# Patient Record
Sex: FEMALE | Race: WHITE | NOT HISPANIC OR LATINO | Employment: OTHER | URBAN - METROPOLITAN AREA
[De-identification: names, ages, dates, MRNs, and addresses within clinical notes are randomized per-mention and may not be internally consistent; named-entity substitution may affect disease eponyms.]

---

## 2019-01-14 ENCOUNTER — TELEPHONE (OUTPATIENT)
Dept: OBSTETRICS AND GYNECOLOGY | Facility: CLINIC | Age: 59
End: 2019-01-14

## 2019-01-14 NOTE — TELEPHONE ENCOUNTER
Adela    She can be added to my schedule on Thursday at the end of the day.  She should be made aware that she might have a long wait to be seen.    Tereza Starks,    Patient is temporarily living in [place] for her job, she called the office for an appointment due to a lesion on her left breast and a painful lump on right underarm, both of which she noticed about 6 months ago. She reports that she has not seen a physician in almost 20 years, has not had screening mammo since she was in her 40's and she is 60 y/o now. She said she cannot get a mammogram because her breast will start bleeding.  She reports that she has taken a few weeks off of work to address these issues. Can you work her in?       Thanks   Adela

## 2019-01-17 ENCOUNTER — OFFICE VISIT (OUTPATIENT)
Dept: OBSTETRICS AND GYNECOLOGY | Facility: CLINIC | Age: 59
End: 2019-01-17

## 2019-01-17 ENCOUNTER — OFFICE VISIT (OUTPATIENT)
Dept: SURGERY | Facility: CLINIC | Age: 59
End: 2019-01-17

## 2019-01-17 VITALS — OXYGEN SATURATION: 98 % | BODY MASS INDEX: 26.2 KG/M2 | HEIGHT: 66 IN | WEIGHT: 163 LBS | HEART RATE: 95 BPM

## 2019-01-17 VITALS
SYSTOLIC BLOOD PRESSURE: 132 MMHG | HEIGHT: 66 IN | DIASTOLIC BLOOD PRESSURE: 80 MMHG | WEIGHT: 163 LBS | BODY MASS INDEX: 26.2 KG/M2

## 2019-01-17 DIAGNOSIS — C50.812 MALIGNANT NEOPLASM OF OVERLAPPING SITES OF LEFT FEMALE BREAST, UNSPECIFIED ESTROGEN RECEPTOR STATUS (HCC): Primary | ICD-10-CM

## 2019-01-17 DIAGNOSIS — Z12.11 COLON CANCER SCREENING: Primary | ICD-10-CM

## 2019-01-17 DIAGNOSIS — C50.412 MALIGNANT NEOPLASM OF UPPER-OUTER QUADRANT OF LEFT FEMALE BREAST, UNSPECIFIED ESTROGEN RECEPTOR STATUS (HCC): ICD-10-CM

## 2019-01-17 DIAGNOSIS — Z01.419 ENCOUNTER FOR GYNECOLOGICAL EXAMINATION WITH PAPANICOLAOU SMEAR OF CERVIX: ICD-10-CM

## 2019-01-17 LAB
DEVELOPER EXPIRATION DATE: NORMAL
DEVELOPER LOT NUMBER: NORMAL
EXPIRATION DATE: NORMAL
FECAL OCCULT BLOOD SCREEN, POC: NEGATIVE
Lab: NORMAL
NEGATIVE CONTROL: NEGATIVE
POSITIVE CONTROL: POSITIVE

## 2019-01-17 PROCEDURE — 99386 PREV VISIT NEW AGE 40-64: CPT | Performed by: OBSTETRICS & GYNECOLOGY

## 2019-01-17 PROCEDURE — G0328 FECAL BLOOD SCRN IMMUNOASSAY: HCPCS | Performed by: OBSTETRICS & GYNECOLOGY

## 2019-01-17 PROCEDURE — 99244 OFF/OP CNSLTJ NEW/EST MOD 40: CPT | Performed by: SURGERY

## 2019-01-17 NOTE — PROGRESS NOTES
Subjective   Marylu Georges is a 59 y.o. female who presents to the office in surgical consultation from Luis M Starks,Rick for a left breast mass.    History of Present Illness     The patient has noticed a breast mass for about 2 years that has slowly gotten larger and is now ulcerated and draining.  She states that the area has been bleeding since June 2018.  At times it bleeds more than other times.  She has never had a mammogram or ultrasound.  She has had no treatment for the breast mass.  She has a significant family history of breast cancer in her mother and her sister.    Review of Systems   Constitutional: Negative for activity change, appetite change, fatigue and fever.   HENT: Negative for trouble swallowing and voice change.    Respiratory: Negative for chest tightness and shortness of breath.    Cardiovascular: Negative for chest pain and palpitations.   Gastrointestinal: Negative for abdominal pain, blood in stool, constipation, diarrhea, nausea and vomiting.   Endocrine: Negative for cold intolerance and heat intolerance.   Genitourinary: Negative for dysuria and flank pain.   Neurological: Negative for dizziness and light-headedness.   Hematological: Negative for adenopathy. Does not bruise/bleed easily.   Psychiatric/Behavioral: Negative for agitation and confusion.     Past Medical History:   Diagnosis Date   • Arthritis      Past Surgical History:   Procedure Laterality Date   • LEG SURGERY Left      Family History   Problem Relation Age of Onset   • Cancer Father    • Cancer Mother    • Breast cancer Mother    • Breast cancer Sister      Social History     Socioeconomic History   • Marital status: Single     Spouse name: Not on file   • Number of children: Not on file   • Years of education: Not on file   • Highest education level: Not on file   Social Needs   • Financial resource strain: Not on file   • Food insecurity - worry: Not on file   • Food insecurity - inability: Not on file   •  Transportation needs - medical: Not on file   • Transportation needs - non-medical: Not on file   Occupational History   • Not on file   Tobacco Use   • Smoking status: Never Smoker   Substance and Sexual Activity   • Alcohol use: Yes   • Drug use: No   • Sexual activity: Not Currently     Birth control/protection: Post-menopausal   Other Topics Concern   • Not on file   Social History Narrative   • Not on file       Objective   Physical Exam   Constitutional: She is oriented to person, place, and time. She appears well-developed and well-nourished.  Non-toxic appearance.   Eyes: EOM are normal. No scleral icterus.   Pulmonary/Chest: Effort normal. No respiratory distress.   Right breast: Normal appearance with no palpable masses.  There is a large axillary lymph node.  Left breast: Large ulcerated mass occupying the majority of the breast but mobile and not invading the chest wall.  The ulceration is along the upper outer quadrant and there are areas of oozing.  There is no obvious axillary lymphadenopathy.   Abdominal: Normal appearance.   Neurological: She is alert and oriented to person, place, and time.   Skin: Skin is warm and dry.   Psychiatric: She has a normal mood and affect. Her behavior is normal. Judgment and thought content normal.       Assessment/Plan       The encounter diagnosis was Malignant neoplasm of overlapping sites of left female breast, unspecified estrogen receptor status (CMS/HCC).    The patient has a large left breast mass that is an obvious breast cancer.  It is complicated with an ulceration and bleeding.  It occupies such a large portion of the breast and the ulcer is large enough that a mastectomy a not be possible due to lack of skin coverage over the chest wall.  She also has a large lymph node in the opposite axilla.  She has been sent to oncology for evaluation because initiation of chemotherapy and radiation therapy is necessary.

## 2019-01-18 NOTE — PROGRESS NOTES
[facility]  [address], [address]  [place] [address]  Phone: [phone] / Fax:  [phone]      01/17/2019    [address] [place] [address] [address] [place] [address]    Provider, No Known    Chief Complaint   Patient presents with   • Gynecologic Exam     Np Annual, Patient with Left breast mass that painful and bleeding.       Marylu Georges is here for annual gynecologic exam.  HPI - Patient has not had a gynecologic or breast evaluation in over 10 years.  Patient states that her work and lifestyle does not lend itself to seeing a physician regularly.  She noticed over 9 months ago, pain and bleeding in her left breast.  The lesion has gradually worsened such that the lesion is enlarged and continually bleeds.  Last mammogram was also over 10 years ago.  She believes her last period was over 5 years ago.    Past Medical History:   Diagnosis Date   • Arthritis        Past Surgical History:   Procedure Laterality Date   • LEG SURGERY Left        Allergies   Allergen Reactions   • Erythromycin GI Intolerance   • Penicillins GI Intolerance       Social History     Socioeconomic History   • Marital status: Single     Spouse name: Not on file   • Number of children: Not on file   • Years of education: Not on file   • Highest education level: Not on file   Social Needs   • Financial resource strain: Not on file   • Food insecurity - worry: Not on file   • Food insecurity - inability: Not on file   • Transportation needs - medical: Not on file   • Transportation needs - non-medical: Not on file   Occupational History   • Not on file   Tobacco Use   • Smoking status: Never Smoker   Substance and Sexual Activity   • Alcohol use: Yes   • Drug use: No   • Sexual activity: Not Currently     Birth control/protection: Post-menopausal   Other Topics Concern   • Not on file   Social History Narrative   • Not on file       Family History   Problem Relation Age of Onset   • Cancer Father    • Cancer Mother    •  "Breast cancer Mother    • Breast cancer Sister        No LMP recorded. Patient is postmenopausal.    OB History      Para Term  AB Living    0 0 0 0 0 0    SAB TAB Ectopic Molar Multiple Live Births    0 0 0 0 0 0          Vitals:    19 1207   BP: 132/80   Weight: 73.9 kg (163 lb)   Height: 167.6 cm (66\")       Physical Exam   Constitutional: She appears well-developed and well-nourished.   Genitourinary: Rectum normal, vagina normal and uterus normal. Pelvic exam was performed with patient supine. There is no tenderness or lesion on the right labia. There is no tenderness or lesion on the left labia. Right adnexum does not display mass, does not display tenderness and does not display fullness. Left adnexum does not display mass, does not display tenderness and does not display fullness. Cervix does not exhibit motion tenderness or lesion. Rectal exam shows no mass and guaiac negative stool.   HENT:   Right Ear: External ear normal.   Left Ear: External ear normal.   Nose: Nose normal.   Eyes: Conjunctivae are normal.   Neck: Normal range of motion. Neck supple. No thyromegaly present.   Cardiovascular: Normal rate, regular rhythm and normal heart sounds.   Pulmonary/Chest: Effort normal. No stridor. She has no wheezes. Right breast exhibits no mass, no skin change and no tenderness. Left breast exhibits mass, skin change and tenderness.   Abdominal: Soft. There is no tenderness. There is no guarding.   Musculoskeletal: Normal range of motion. She exhibits no edema.   Lymphadenopathy:     She has axillary adenopathy.        Right axillary: Pectoral and lateral adenopathy present.        Left axillary: Pectoral and lateral adenopathy present.   Neurological: She is alert.   Skin: Skin is warm and dry. No erythema.   Psychiatric: She has a normal mood and affect. Her behavior is normal. Judgment and thought content normal.   Vitals reviewed.      Marylu was seen today for gynecologic " exam.    Diagnoses and all orders for this visit:    Colon cancer screening  -     POC Occult Blood Stool    Encounter for gynecological examination with Papanicolaou smear of cervix  -     IgP, Aptima HPV        Luis M Starks MD

## 2019-01-19 LAB
CYTOLOGIST CVX/VAG CYTO: NORMAL
CYTOLOGY CVX/VAG DOC THIN PREP: NORMAL
DX ICD CODE: NORMAL
HIV 1 & 2 AB SER-IMP: NORMAL
HPV I/H RISK 4 DNA CVX QL PROBE+SIG AMP: NEGATIVE
OTHER STN SPEC: NORMAL
PATH REPORT.FINAL DX SPEC: NORMAL
STAT OF ADQ CVX/VAG CYTO-IMP: NORMAL

## 2019-01-21 NOTE — PROGRESS NOTES
Subjective     REASON FOR CONSULTATION:  1. 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 R BREAST MASS WITH GIANT R AXILLARY ADENOPATHY.  3. FAMILY HISTORY OF BREAST CANCER IN MOTHER AND SISTER, SISTER WAS TESTED FOR BRCA AND WAS NEGATIVE.  Provide an opinion on any further workup or treatment                             REQUESTING PHYSICIAN:  DR DAVID COLBY MD    RECORDS OBTAINED:  Records of the patients history including those obtained from the referring provider were reviewed and summarized in detail.        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 She typically goes to multiple places throughout the [place]. In any event, she has been referred to me because the patient has had a mass in the left breast at least for 3 or 4 years that has been slowly growing until became giant and ulcerated and has been having bleeding at least for the last 2 or 3 months. The patient is having pain, especially in the periphery of the tumor. The breast has become so big that she has difficulty dealing with this anymore. She also has noticed that in the right axilla she has developed a huge mass that is not painful that has been present at least and growing steadily for the last 3 or 4 months. She has not had any palpable abnormalities in the right breast but she is suspicious in an area external to the nipple. The patient denies any skeletal pain. Her appetite has been excellent and her weight is stable. She has not had any cough, sputum production or shortness of breath. Her bowel function and urination are normal. She has not had any vaginal bleeding or discharge. She has not had any other skin lesions. She denies any neurological  symptomatology.     The patient is extremely committed to her animals, living with the horses, and she has forgotten that she has to take care of herself. She has not been seen by a doctor in more than 20 years. She occasionally is seen by doctors available in [place] for a Pap smear here or a Zithromax pack there but other than that she has no regular followup of any kind.        Past Surgical History:   Procedure Laterality Date   • LEG SURGERY Left         Current Outpatient Medications on File Prior to Visit   Medication Sig Dispense Refill   • diclofenac sodium (VOTAREN XR) 100 MG 24 hr tablet Take 100 mg by mouth Daily.       No current facility-administered medications on file prior to visit.         ALLERGIES:    Allergies   Allergen Reactions   • Erythromycin GI Intolerance   • Penicillins GI Intolerance        Social History     Socioeconomic History   • Marital status: Significant Other     Spouse name: Not on file   • Number of children: 0   • Years of education: Not on file   • Highest education level: Not on file   Social Needs   • Financial resource strain: Not hard at all   • Food insecurity - worry: Never true   • Food insecurity - inability: Never true   • Transportation needs - medical: No   • Transportation needs - non-medical: No   Occupational History   • Occupation:      Employer: SELF-EMPLOYED   Tobacco Use   • Smoking status: Never Smoker   • Smokeless tobacco: Never Used   Substance and Sexual Activity   • Alcohol use: Yes     Alcohol/week: 4.2 oz     Types: 4 Glasses of wine, 3 Cans of beer per week     Frequency: 2-3 times a week     Drinks per session: 1 or 2     Binge frequency: Never   • Drug use: No   • Sexual activity: Not Currently     Partners: Male     Birth control/protection: Post-menopausal        Family History   Problem Relation Age of Onset   • Lung cancer Father    • Cancer Mother    • Breast cancer Mother    • Breast cancer Sister    • Breast cancer  Maternal Grandmother    • Breast cancer Paternal Grandmother    • Breast cancer Maternal Uncle         Review of Systems   General: no fever, no chills, no fatigue,no weight changes, no lack of appetite.  Eyes: no epiphora, xerophthalmia,conjunctivitis, pain, glaucoma, blurred vision, blindness, secretion, photophobia, proptosis, diplopia.  Ears: no otorrhea, tinnitus, otorrhagia, deafness, pain, vertigo.  Nose: no rhinorrhea, no epistaxis, no alteration in perception of odors, no sinuses pressure.  Mouth: no alteration in gums or teeth,  No ulcers, no difficulty with mastication or deglut ion, no odynophagia.  Neck: no masses or pain, no thyroid alterations, no pain in muscles or arteries, no carotid odynia, no crepitation.  Respiratory: no cough, no sputum production,no dyspnea,no trepopnea, no pleuritic pain,no hemoptysis.  Heart: no syncope, no irregularity, no palpitations, no angina,no orthopnea,no paroxysmal nocturnal dyspnea.  Vascular Venous: no tenderness,no edema,no palpable cords,no postphlebitic syndrome, no skin changes no ulcerations.  Vascular Arterial: no distal ischemia, noclaudication, no gangrene, no neuropathic ischemic pain, no skin ulcers, no paleness no cyanosis.  GI: no dysphagia, no odynophagia, no regurgitation, no heartburn,no indigestion,no nausea,no vomiting,no hematemesis ,no melena,no jaundice,no distention, no obstipation,no enterorrhagia,no proctalgia,no anal  lesions, no changes in bowel habits.  : no frequency, no hesitancy, no hematuria, no discharge,no  pain.  Musculoskeletal: no muscle or tendon pain or inflammation,no  joint pain, no edema, no functional limitation,no fasciculations, no mass.  Neurologic: no headache, no seizures, noalterations on Craneal nerves, no motor deficit, no sensory deficit, normal coordination, no alteration in memory,normal orientation, calculation,normal writting, verbal and written language.  Skin: no rashes,no pruritus no localized  "lesions.  Psychiatric: no anxiety, no depression,no agitation, no delusions, proper insight.      Objective     Vitals:    01/22/19 1614   BP: 143/88   Pulse: 83   Resp: 12   Temp: 98.8 °F (37.1 °C)   TempSrc: Oral   SpO2: 98%   Weight: 72.7 kg (160 lb 4.8 oz)   Height: 163 cm (64.17\")   PainSc: 0-No pain     Current Status 1/22/2019   ECOG score 0       Physical Exam    GENERAL:  Well-developed, well-nourished  Patient  in no acute distress.   SKIN:  Warm, dry ,NO rashes,NO purpura ,NO petechiae.  HEENT:  Pupils were equal and reactive to light and accomodation, conjunctivas non injected, no pterigion, normal extraocular movements, normal visual acuity.   Mouth mucosa was moist, no exudates in oropharynx, normal gum line, normal roof of the mouth and pillars, normal papillations of the tongue.  NECK:  Supple with good range of motion; no thyromegaly or masses, no JVD or bruits, no cervical adenopathies.No carotid arteries pain, no carotid abnormal pulsation , NO arterial dance.  LYMPHATICS:  No cervical, NO supraclavicular, NO axillary,NO epitrochlear , NO inguinal adenopathy.  CHEST:  Normal excursion of both luz thoraces, normal voice fremitus, no subcutaneous emphysema, normal axillas, no rashes or acanthosis nigricans. Lungs clear to percussion and auscultation, normal breath sounds bilaterally, no wheezing,NO crackles NO ronchi, NO stridor, NO rubs.  CARDIAC AND VASCULAR:  normal rate and regular rhythm, without murmurs,NO rubs NO S3 NO S4 right or left . Normal femoral, popliteal, pedis, brachial and carotid pulses.  INSPECTION of R breast documented symmetry of the tissue per se and location and size of the nipple,no retractions or inversion of the nipple, normal skin without lesions, no erythema or nodules,no paud'orange, no prominence of superficial veins or chest wall collateral circulation.PALPATION of the breast documented normal skin turgor, no induration, alteration in local temperature, or pain, 9 " O'CLOCK 4 CM DISCRETE MASS palpable, normal mobility of the tissues,no fixation of the tissue or parenchyma to the chest wall, MAJOR alteration at the tail of the breasts RIGHT 9 CM UNIFORM FIXED NONE TENDER NOT FLUCTUANT adenopathY. On the left breast, the whole breast is replaced by a huge mass that is close to 25 cm in diameter with a large area of ulceration that measures close to 15 cm in diameter with central area of necrosis and oozing of material that is gelatinous and nonodorous. There is minimal erythema at the edges of the tumor and the mass is fixed to the chest wall, nonmobile. There is abundant amount of collateral circulation in the anterior chest wall superiorly on the left hemithorax. Significant enough there is no obvious visible or palpable adenopathies in the left axilla. There is no lymphedema in either upper extremity.  ABDOMEN:  Soft, nontender with no organomegaly or masses, no ascites, no collateral circulation,no distention,no Vernon sign, no abdominal pain, no inguinal hernias,no umbilical hernia, no abdominal bruits. Normal bowel sounds.  GENITAL: Not  Performed.  EXTREMITIES  AND SPINE:  No clubbing, cyanosis or edema, OA FINGERS deformities or pain .No kyphosis, scoliosis, deformities or pain in spine, ribs or pelvic bone.  NEUROLOGICAL:  Patient was awake, alert, oriented to time, person and place.        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 At least for the last 4 years, she has had an in crescendo mass in the left breast that has produced a gigantic tumor that is now close to 25 cm in size and is replacing most of the anatomy of the left breast. There are areas of ulceration necrosis and bleeding and the mass is fixed to the chest wall. She has abundant amount of collateral circulation in the left anterior chest wall and it caught my attention the lack of any left axillary adenopathy. She has no lymphedema in the left upper extremity. In the right breast while in sitting position, no palpable abnormalities are documented. The right breast skin and nipple are normal. When the patient lies flat, there is a palpable mass at 9 o'clock from the nipple that measures probably 4 cm in size, very indistinct in regard to edges and margins. There was no mobility and no areas of tenderness. She has a giant adenopathy in the right axilla that measures close to 9 cm in size, uniform, no fluctuation, nontender, completely fixed to the axillary wall. There is no compromise of the skin.     There is no supraclavicular or infraclavicular adenopathies. The rest of her physical examination is normal.     The degree of comorbidities in this patient are minimal with the exception of minimal osteoarthritis in the fingers of her hands. She typically does not take any medicines and obviously, she has neglected herself at least for the last 20 years in regard to medical care.     The patient also has a component of anemia that is very likely anemia of neoplastic disease.     Obviously, this patient has probably bilateral breast cancers. She has a right axillary adenopathy. She has dramatic compromise of the left breast and she has ulceration necrosis and oozing from this site. She has pain but she is tough enough that she does not want to take any pain medicine.       The patient also has a strong family history of  breast cancer in her mother who developed breast cancer when she was 40; her sister developed breast cancer when she was pregnant. The patient had a BRCA analysis that was negative. Nevertheless, the family history extends furthermore into more family members with breast cancer.     Therefore, under the present premises and discussing this with the patient and her brother who came from [place], we advised the patient the followin. She will return to the lab to proceed with laboratory testing that will include a complete metabolic panel, an anemia workup that will include a ferritin, iron, TIBC, B12, folic acid level and she will have tumor marker, CA 15-3. An LDH will be done. A sedimentation rate will be done.   2. The patient will proceed with an MRI scan of the brain.   3. The patient will proceed with a CT scan of the chest, abdomen and pelvis.   4. The patient will proceed with a bone scan.   5. The patient will proceed with a right-sided mammogram and if a mass is found, biopsy of that tumor and also she will require right axillary biopsy. Also the patient will require a left breast biopsy. All these biopsies can be done with needles.     After all this information is collected, the patient will be brought back to the office in a week, go through the pathology and make further recommendations. On clinical grounds, the patient has probably bilateral breast cancers. It caught my attention very dramatically the lack of left axillary adenopathy for a tumor that has been growing in the left breast at least for 4 years. It caught my attention the abnormality in the palpable right breast with such a giant adenopathy in the right axilla.      I have the belief that maybe we are going to face 2 different kinds of malignancy, one of each in each breast.     Therefore, we will proceed with pathological analysis of this tissue, do ER/CO, HER2 maria eugenia by FISH, Ki-67 and obviously go from there.     Obviously, the  metastatic workup is going to tell us if we are facing some other problems that I will not be surprised to find even though on clinical grounds seems to be not that obvious. The patient is strong. 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 She is very strong in regard to her physical activity and she is extremely hardheaded and ready to go to work on daily basis. Therefore, I would not be surprised if we have to end up obviously placing a port for initiation of neoadjuvant chemotherapy.     Obviously, one of the questions that I have will be if we go through neoadjuvant treatment is how to proceed with an echocardiogram or measurement of ejection fraction. Maybe she will need to have a MUGA ejection fraction or a transesophageal echocardiogram because I doubt that a technician will be able to navigate through her left anterior chest wall.     I will discuss the rest of the findings and all the laboratory testing when the patient returns back in a week or so and then we will make a plan of treatment according to needs.     I discussed all these facts with the patient and her brother who came from [place] in detail. Photographs were obtained from the patient’s right breast, right axilla, central chest wall and left breast and placed in [facility].     The case will be presented in the Multidisciplinary Breast Cancer Conference this Friday.

## 2019-01-22 ENCOUNTER — LAB (OUTPATIENT)
Dept: LAB | Facility: HOSPITAL | Age: 59
End: 2019-01-22

## 2019-01-22 ENCOUNTER — CLINICAL SUPPORT (OUTPATIENT)
Dept: ONCOLOGY | Facility: CLINIC | Age: 59
End: 2019-01-22

## 2019-01-22 ENCOUNTER — CONSULT (OUTPATIENT)
Dept: ONCOLOGY | Facility: CLINIC | Age: 59
End: 2019-01-22

## 2019-01-22 VITALS
HEIGHT: 64 IN | RESPIRATION RATE: 12 BRPM | HEART RATE: 83 BPM | SYSTOLIC BLOOD PRESSURE: 143 MMHG | BODY MASS INDEX: 27.37 KG/M2 | DIASTOLIC BLOOD PRESSURE: 88 MMHG | OXYGEN SATURATION: 98 % | WEIGHT: 160.3 LBS | TEMPERATURE: 98.8 F

## 2019-01-22 DIAGNOSIS — C50.812 MALIGNANT NEOPLASM OF OVERLAPPING SITES OF LEFT FEMALE BREAST, UNSPECIFIED ESTROGEN RECEPTOR STATUS (HCC): Primary | ICD-10-CM

## 2019-01-22 DIAGNOSIS — R22.31 AXILLARY MASS, RIGHT: ICD-10-CM

## 2019-01-22 DIAGNOSIS — D63.0 ANEMIA IN NEOPLASTIC DISEASE: ICD-10-CM

## 2019-01-22 PROBLEM — Z80.3 FAMILY HISTORY OF BREAST CANCER IN FEMALE: Status: ACTIVE | Noted: 2019-01-22

## 2019-01-22 PROBLEM — C50.919 BREAST CANCER: Status: ACTIVE | Noted: 2019-01-22

## 2019-01-22 LAB
BASOPHILS # BLD AUTO: 0.06 10*3/MM3 (ref 0–0.1)
BASOPHILS NFR BLD AUTO: 0.9 % (ref 0–1.1)
CANCER AG15-3 SERPL-ACNC: 44.1 U/ML
DEPRECATED RDW RBC AUTO: 44.4 FL (ref 37–49)
EOSINOPHIL # BLD AUTO: 0.12 10*3/MM3 (ref 0–0.36)
EOSINOPHIL NFR BLD AUTO: 1.7 % (ref 1–5)
ERYTHROCYTE [DISTWIDTH] IN BLOOD BY AUTOMATED COUNT: 14.2 % (ref 11.7–14.5)
ERYTHROCYTE [SEDIMENTATION RATE] IN BLOOD: 25 MM/HR (ref 0–30)
FOLATE SERPL-MCNC: 13.79 NG/ML (ref 4.78–24.2)
HCT VFR BLD AUTO: 34.9 % (ref 34–45)
HGB BLD-MCNC: 11.2 G/DL (ref 11.5–14.9)
HGB RETIC QN AUTO: 28.3 PG (ref 29.8–36.1)
IMM GRANULOCYTES # BLD AUTO: 0.03 10*3/MM3 (ref 0–0.03)
IMM GRANULOCYTES NFR BLD AUTO: 0.4 % (ref 0–0.5)
IMM RETICS NFR: 11.7 % (ref 3–15.8)
LYMPHOCYTES # BLD AUTO: 1.18 10*3/MM3 (ref 1–3.5)
LYMPHOCYTES NFR BLD AUTO: 16.8 % (ref 20–49)
MCH RBC QN AUTO: 27.7 PG (ref 27–33)
MCHC RBC AUTO-ENTMCNC: 32.1 G/DL (ref 32–35)
MCV RBC AUTO: 86.2 FL (ref 83–97)
MONOCYTES # BLD AUTO: 0.48 10*3/MM3 (ref 0.25–0.8)
MONOCYTES NFR BLD AUTO: 6.8 % (ref 4–12)
NEUTROPHILS # BLD AUTO: 5.17 10*3/MM3 (ref 1.5–7)
NEUTROPHILS NFR BLD AUTO: 73.4 % (ref 39–75)
NRBC BLD AUTO-RTO: 0 /100 WBC (ref 0–0)
PLATELET # BLD AUTO: 290 10*3/MM3 (ref 150–375)
PMV BLD AUTO: 9.1 FL (ref 8.9–12.1)
RBC # BLD AUTO: 4.05 10*6/MM3 (ref 3.9–5)
RETICS/RBC NFR AUTO: 1.43 % (ref 0.6–2)
VIT B12 BLD-MCNC: 760 PG/ML (ref 211–946)
WBC NRBC COR # BLD: 7.04 10*3/MM3 (ref 4–10)

## 2019-01-22 PROCEDURE — 83540 ASSAY OF IRON: CPT | Performed by: INTERNAL MEDICINE

## 2019-01-22 PROCEDURE — 85025 COMPLETE CBC W/AUTO DIFF WBC: CPT | Performed by: INTERNAL MEDICINE

## 2019-01-22 PROCEDURE — 80053 COMPREHEN METABOLIC PANEL: CPT | Performed by: INTERNAL MEDICINE

## 2019-01-22 PROCEDURE — 84466 ASSAY OF TRANSFERRIN: CPT | Performed by: INTERNAL MEDICINE

## 2019-01-22 PROCEDURE — 85046 RETICYTE/HGB CONCENTRATE: CPT | Performed by: INTERNAL MEDICINE

## 2019-01-22 PROCEDURE — 83615 LACTATE (LD) (LDH) ENZYME: CPT | Performed by: INTERNAL MEDICINE

## 2019-01-22 PROCEDURE — 36415 COLL VENOUS BLD VENIPUNCTURE: CPT | Performed by: INTERNAL MEDICINE

## 2019-01-22 PROCEDURE — 85652 RBC SED RATE AUTOMATED: CPT | Performed by: INTERNAL MEDICINE

## 2019-01-22 PROCEDURE — 82746 ASSAY OF FOLIC ACID SERUM: CPT | Performed by: INTERNAL MEDICINE

## 2019-01-22 PROCEDURE — 99245 OFF/OP CONSLTJ NEW/EST HI 55: CPT | Performed by: INTERNAL MEDICINE

## 2019-01-22 PROCEDURE — 86300 IMMUNOASSAY TUMOR CA 15-3: CPT | Performed by: INTERNAL MEDICINE

## 2019-01-22 PROCEDURE — 82607 VITAMIN B-12: CPT | Performed by: INTERNAL MEDICINE

## 2019-01-22 PROCEDURE — 82728 ASSAY OF FERRITIN: CPT | Performed by: INTERNAL MEDICINE

## 2019-01-22 RX ORDER — LIDOCAINE HYDROCHLORIDE AND EPINEPHRINE 10; 10 MG/ML; UG/ML
10 INJECTION, SOLUTION INFILTRATION; PERINEURAL ONCE
Status: CANCELLED | OUTPATIENT
Start: 2019-01-23 | End: 2019-01-22

## 2019-01-22 NOTE — PROGRESS NOTES
Subjective     REASON FOR CONSULTATION:  ***  Provide an opinion on any further workup or treatment                             REQUESTING PHYSICIAN:  ***    RECORDS OBTAINED:  Records of the patients history including those obtained from the referring provider were reviewed and summarized in detail.    HISTORY OF PRESENT ILLNESS:  The patient is a 59 y.o. year old female who is here for an opinion about the above issue.    History of Present Illness ***    Past Medical History:   Diagnosis Date   • Arthritis    • Breast cancer (CMS/HCC)     Left        Past Surgical History:   Procedure Laterality Date   • LEG SURGERY Left         Current Outpatient Medications on File Prior to Visit   Medication Sig Dispense Refill   • diclofenac sodium (VOTAREN XR) 100 MG 24 hr tablet Take 100 mg by mouth Daily.       No current facility-administered medications on file prior to visit.         ALLERGIES:    Allergies   Allergen Reactions   • Erythromycin GI Intolerance   • Penicillins GI Intolerance        Social History     Socioeconomic History   • Marital status: Single     Spouse name: Not on file   • Number of children: Not on file   • Years of education: Not on file   • Highest education level: Not on file   Occupational History     Employer: SELF-EMPLOYED   Tobacco Use   • Smoking status: Never Smoker   • Smokeless tobacco: Never Used   Substance and Sexual Activity   • Alcohol use: Yes   • Drug use: No   • Sexual activity: Not Currently     Birth control/protection: Post-menopausal        Family History   Problem Relation Age of Onset   • Lung cancer Father    • Cancer Mother    • Breast cancer Mother    • Breast cancer Sister    • Breast cancer Maternal Grandmother    • Breast cancer Paternal Grandmother    • Breast cancer Maternal Uncle         Review of Systems ***    Objective     Vitals:    01/22/19 1614   BP: 143/88   Pulse: 83   Resp: 12   Temp: 98.8 °F (37.1 °C)   TempSrc: Oral   SpO2: 98%   Weight: 72.7 kg (160  "lb 4.8 oz)   Height: 163 cm (64.17\")   PainSc: 0-No pain     Current Status 1/22/2019   ECOG score 0       Physical Exam  ***    RECENT LABS:  Hematology WBC   Date Value Ref Range Status   01/22/2019 7.04 4.00 - 10.00 10*3/mm3 Final     RBC   Date Value Ref Range Status   01/22/2019 4.05 3.90 - 5.00 10*6/mm3 Final     Hemoglobin   Date Value Ref Range Status   01/22/2019 11.2 (L) 11.5 - 14.9 g/dL Final     Hematocrit   Date Value Ref Range Status   01/22/2019 34.9 34.0 - 45.0 % Final     Platelets   Date Value Ref Range Status   01/22/2019 290 150 - 375 10*3/mm3 Final          Assessment/Plan     ***             "

## 2019-01-23 ENCOUNTER — TELEPHONE (OUTPATIENT)
Dept: ONCOLOGY | Facility: CLINIC | Age: 59
End: 2019-01-23

## 2019-01-23 DIAGNOSIS — N63.20 MASS OF BOTH BREASTS ON MAMMOGRAM: ICD-10-CM

## 2019-01-23 DIAGNOSIS — N63.10 MASS OF BOTH BREASTS ON MAMMOGRAM: ICD-10-CM

## 2019-01-23 DIAGNOSIS — R22.31 AXILLARY MASS, RIGHT: Primary | ICD-10-CM

## 2019-01-23 LAB
ALBUMIN SERPL-MCNC: 4.7 G/DL (ref 3.5–5.2)
ALBUMIN/GLOB SERPL: 1.7 G/DL (ref 1.1–2.4)
ALP SERPL-CCNC: 110 U/L (ref 38–116)
ALT SERPL W P-5'-P-CCNC: 26 U/L (ref 0–33)
ANION GAP SERPL CALCULATED.3IONS-SCNC: 13.8 MMOL/L
AST SERPL-CCNC: 37 U/L (ref 0–32)
BILIRUB SERPL-MCNC: <0.2 MG/DL (ref 0.1–1.2)
BUN BLD-MCNC: 25 MG/DL (ref 6–20)
BUN/CREAT SERPL: 31.3 (ref 7.3–30)
CALCIUM SPEC-SCNC: 9.6 MG/DL (ref 8.5–10.2)
CHLORIDE SERPL-SCNC: 103 MMOL/L (ref 98–107)
CO2 SERPL-SCNC: 25.2 MMOL/L (ref 22–29)
CREAT BLD-MCNC: 0.8 MG/DL (ref 0.6–1.1)
FERRITIN SERPL-MCNC: 48 NG/ML
GFR SERPL CREATININE-BSD FRML MDRD: 73 ML/MIN/1.73
GLOBULIN UR ELPH-MCNC: 2.8 GM/DL (ref 1.8–3.5)
GLUCOSE BLD-MCNC: 124 MG/DL (ref 74–124)
IRON 24H UR-MRATE: 33 MCG/DL (ref 37–145)
IRON SATN MFR SERPL: 8 % (ref 14–48)
LDH SERPL-CCNC: 230 U/L (ref 99–259)
POTASSIUM BLD-SCNC: 4 MMOL/L (ref 3.5–4.7)
PROT SERPL-MCNC: 7.5 G/DL (ref 6.3–8)
SODIUM BLD-SCNC: 142 MMOL/L (ref 134–145)
TIBC SERPL-MCNC: 402 MCG/DL (ref 249–505)
TRANSFERRIN SERPL-MCNC: 287 MG/DL (ref 200–360)

## 2019-01-23 NOTE — TELEPHONE ENCOUNTER
I spoke with the pt and advised her to stop taking aleve and ibuprofen per Dr. Dixon. The pt asked me if she could get her shingles vaccine. I asked Dr. Dixon if she could, he said yes it was ok. I told the pt it was ok to get the shingles vaccine. Pt v/u

## 2019-01-23 NOTE — PROGRESS NOTES
Order placed for mignon breast ultrasound and right breast mammogram per VO Dr Dixon per request of [facility] Dr Guevara  Order for ultrasound guided left and right breast biopsy per Dr Dixon

## 2019-01-24 ENCOUNTER — HOSPITAL ENCOUNTER (OUTPATIENT)
Dept: NUCLEAR MEDICINE | Facility: HOSPITAL | Age: 59
Discharge: HOME | End: 2019-01-24
Attending: INTERNAL MEDICINE

## 2019-01-24 ENCOUNTER — HOSPITAL ENCOUNTER (OUTPATIENT)
Dept: CT IMAGING | Facility: HOSPITAL | Age: 59
Discharge: HOME | End: 2019-01-24
Attending: INTERNAL MEDICINE | Admitting: INTERNAL MEDICINE

## 2019-01-24 ENCOUNTER — HOSPITAL ENCOUNTER (OUTPATIENT)
Dept: MRI IMAGING | Facility: HOSPITAL | Age: 59
Discharge: HOME | End: 2019-01-24
Attending: INTERNAL MEDICINE

## 2019-01-24 DIAGNOSIS — D63.0 ANEMIA IN NEOPLASTIC DISEASE: ICD-10-CM

## 2019-01-24 DIAGNOSIS — C50.812 MALIGNANT NEOPLASM OF OVERLAPPING SITES OF LEFT FEMALE BREAST, UNSPECIFIED ESTROGEN RECEPTOR STATUS (HCC): ICD-10-CM

## 2019-01-24 LAB — CREAT BLDA-MCNC: 0.7 MG/DL (ref 0.6–1.3)

## 2019-01-24 PROCEDURE — 74177 CT ABD & PELVIS W/CONTRAST: CPT

## 2019-01-24 PROCEDURE — 0 TECHNETIUM MEDRONATE KIT: Performed by: INTERNAL MEDICINE

## 2019-01-24 PROCEDURE — A9577 INJ MULTIHANCE: HCPCS | Performed by: INTERNAL MEDICINE

## 2019-01-24 PROCEDURE — 71260 CT THORAX DX C+: CPT

## 2019-01-24 PROCEDURE — A9503 TC99M MEDRONATE: HCPCS | Performed by: INTERNAL MEDICINE

## 2019-01-24 PROCEDURE — 70553 MRI BRAIN STEM W/O & W/DYE: CPT

## 2019-01-24 PROCEDURE — 0 GADOBENATE DIMEGLUMINE 529 MG/ML SOLUTION: Performed by: INTERNAL MEDICINE

## 2019-01-24 PROCEDURE — 78306 BONE IMAGING WHOLE BODY: CPT

## 2019-01-24 PROCEDURE — 25010000002 IOPAMIDOL 61 % SOLUTION: Performed by: INTERNAL MEDICINE

## 2019-01-24 PROCEDURE — 82565 ASSAY OF CREATININE: CPT

## 2019-01-24 RX ORDER — TC 99M MEDRONATE 20 MG/10ML
24 INJECTION, POWDER, LYOPHILIZED, FOR SOLUTION INTRAVENOUS
Status: DISCONTINUED | OUTPATIENT
Start: 2019-01-24 | End: 2019-01-25 | Stop reason: HOSPADM

## 2019-01-24 RX ORDER — TC 99M MEDRONATE 20 MG/10ML
24 INJECTION, POWDER, LYOPHILIZED, FOR SOLUTION INTRAVENOUS
Status: COMPLETED | OUTPATIENT
Start: 2019-01-24 | End: 2019-01-24

## 2019-01-24 RX ADMIN — Medication 24 MILLICURIE: at 07:21

## 2019-01-24 RX ADMIN — IOPAMIDOL 85 ML: 612 INJECTION, SOLUTION INTRAVENOUS at 07:54

## 2019-01-24 RX ADMIN — GADOBENATE DIMEGLUMINE 15 ML: 529 INJECTION, SOLUTION INTRAVENOUS at 11:11

## 2019-01-29 ENCOUNTER — APPOINTMENT (OUTPATIENT)
Dept: MAMMOGRAPHY | Facility: HOSPITAL | Age: 59
End: 2019-01-29
Attending: INTERNAL MEDICINE

## 2019-01-29 ENCOUNTER — HOSPITAL ENCOUNTER (OUTPATIENT)
Dept: ULTRASOUND IMAGING | Facility: HOSPITAL | Age: 59
Discharge: HOME | End: 2019-01-29
Attending: INTERNAL MEDICINE

## 2019-01-29 ENCOUNTER — HOSPITAL ENCOUNTER (OUTPATIENT)
Dept: ULTRASOUND IMAGING | Facility: HOSPITAL | Age: 59
Discharge: HOME | End: 2019-01-29
Attending: INTERNAL MEDICINE | Admitting: INTERNAL MEDICINE

## 2019-01-29 ENCOUNTER — HOSPITAL ENCOUNTER (OUTPATIENT)
Dept: MAMMOGRAPHY | Facility: HOSPITAL | Age: 59
Discharge: HOME | End: 2019-01-29
Attending: INTERNAL MEDICINE

## 2019-01-29 VITALS
HEIGHT: 64 IN | HEART RATE: 78 BPM | WEIGHT: 155 LBS | SYSTOLIC BLOOD PRESSURE: 152 MMHG | OXYGEN SATURATION: 99 % | BODY MASS INDEX: 26.46 KG/M2 | DIASTOLIC BLOOD PRESSURE: 94 MMHG | RESPIRATION RATE: 16 BRPM

## 2019-01-29 DIAGNOSIS — N63.20 MASS OF BOTH BREASTS ON MAMMOGRAM: ICD-10-CM

## 2019-01-29 DIAGNOSIS — N63.10 MASS OF BOTH BREASTS ON MAMMOGRAM: ICD-10-CM

## 2019-01-29 DIAGNOSIS — C50.812 MALIGNANT NEOPLASM OF OVERLAPPING SITES OF LEFT FEMALE BREAST, UNSPECIFIED ESTROGEN RECEPTOR STATUS (HCC): ICD-10-CM

## 2019-01-29 DIAGNOSIS — D63.0 ANEMIA IN NEOPLASTIC DISEASE: ICD-10-CM

## 2019-01-29 PROCEDURE — 88341 IMHCHEM/IMCYTCHM EA ADD ANTB: CPT | Performed by: INTERNAL MEDICINE

## 2019-01-29 PROCEDURE — 88342 IMHCHEM/IMCYTCHM 1ST ANTB: CPT | Performed by: INTERNAL MEDICINE

## 2019-01-29 PROCEDURE — 88360 TUMOR IMMUNOHISTOCHEM/MANUAL: CPT | Performed by: INTERNAL MEDICINE

## 2019-01-29 PROCEDURE — 88305 TISSUE EXAM BY PATHOLOGIST: CPT | Performed by: INTERNAL MEDICINE

## 2019-01-29 PROCEDURE — 76642 ULTRASOUND BREAST LIMITED: CPT

## 2019-01-29 PROCEDURE — 77065 DX MAMMO INCL CAD UNI: CPT

## 2019-01-29 RX ORDER — LIDOCAINE HYDROCHLORIDE 10 MG/ML
20 INJECTION, SOLUTION INFILTRATION; PERINEURAL ONCE
Status: COMPLETED | OUTPATIENT
Start: 2019-01-29 | End: 2019-01-29

## 2019-01-29 RX ORDER — UBIDECARENONE 100 MG
100 CAPSULE ORAL DAILY
COMMUNITY
End: 2019-07-01

## 2019-01-29 RX ORDER — LIDOCAINE HYDROCHLORIDE AND EPINEPHRINE 10; 10 MG/ML; UG/ML
10 INJECTION, SOLUTION INFILTRATION; PERINEURAL ONCE
Status: DISCONTINUED | OUTPATIENT
Start: 2019-01-29 | End: 2019-01-30 | Stop reason: HOSPADM

## 2019-01-29 RX ORDER — LIDOCAINE HYDROCHLORIDE AND EPINEPHRINE 10; 10 MG/ML; UG/ML
10 INJECTION, SOLUTION INFILTRATION; PERINEURAL ONCE
Status: COMPLETED | OUTPATIENT
Start: 2019-01-29 | End: 2019-01-29

## 2019-01-29 RX ORDER — LIDOCAINE HYDROCHLORIDE 10 MG/ML
10 INJECTION, SOLUTION INFILTRATION; PERINEURAL ONCE
Status: COMPLETED | OUTPATIENT
Start: 2019-01-29 | End: 2019-01-29

## 2019-01-29 RX ORDER — BIOTIN 5 MG
1000 TABLET ORAL DAILY
COMMUNITY
End: 2019-07-01

## 2019-01-29 RX ADMIN — LIDOCAINE HYDROCHLORIDE 5 ML: 10 INJECTION, SOLUTION INFILTRATION; PERINEURAL at 14:25

## 2019-01-29 RX ADMIN — LIDOCAINE HYDROCHLORIDE AND EPINEPHRINE 5 ML: 10; 10 INJECTION, SOLUTION INFILTRATION; PERINEURAL at 14:20

## 2019-01-29 RX ADMIN — LIDOCAINE HYDROCHLORIDE 20 ML: 10 INJECTION, SOLUTION INFILTRATION; PERINEURAL at 14:25

## 2019-01-30 ENCOUNTER — LAB (OUTPATIENT)
Dept: LAB | Facility: HOSPITAL | Age: 59
End: 2019-01-30

## 2019-01-30 ENCOUNTER — OFFICE VISIT (OUTPATIENT)
Dept: ONCOLOGY | Facility: CLINIC | Age: 59
End: 2019-01-30

## 2019-01-30 ENCOUNTER — TRANSCRIBE ORDERS (OUTPATIENT)
Dept: ONCOLOGY | Facility: CLINIC | Age: 59
End: 2019-01-30

## 2019-01-30 ENCOUNTER — OFFICE VISIT (OUTPATIENT)
Dept: SURGERY | Facility: CLINIC | Age: 59
End: 2019-01-30

## 2019-01-30 VITALS
DIASTOLIC BLOOD PRESSURE: 79 MMHG | HEART RATE: 76 BPM | SYSTOLIC BLOOD PRESSURE: 129 MMHG | OXYGEN SATURATION: 97 % | TEMPERATURE: 98.6 F | WEIGHT: 164 LBS | HEIGHT: 64 IN | BODY MASS INDEX: 28 KG/M2 | RESPIRATION RATE: 14 BRPM

## 2019-01-30 DIAGNOSIS — Z17.0 MALIGNANT NEOPLASM OF OVERLAPPING SITES OF BOTH BREASTS IN FEMALE, ESTROGEN RECEPTOR POSITIVE (HCC): Primary | ICD-10-CM

## 2019-01-30 DIAGNOSIS — C50.812 MALIGNANT NEOPLASM OF OVERLAPPING SITES OF BOTH BREASTS IN FEMALE, ESTROGEN RECEPTOR POSITIVE (HCC): Primary | ICD-10-CM

## 2019-01-30 DIAGNOSIS — C50.812 MALIGNANT NEOPLASM OF OVERLAPPING SITES OF LEFT FEMALE BREAST, UNSPECIFIED ESTROGEN RECEPTOR STATUS (HCC): ICD-10-CM

## 2019-01-30 DIAGNOSIS — C50.811 MALIGNANT NEOPLASM OF OVERLAPPING SITES OF BOTH BREASTS IN FEMALE, ESTROGEN RECEPTOR POSITIVE (HCC): Primary | ICD-10-CM

## 2019-01-30 DIAGNOSIS — I87.8 POOR VENOUS ACCESS: ICD-10-CM

## 2019-01-30 DIAGNOSIS — C50.812 MALIGNANT NEOPLASM OF OVERLAPPING SITES OF LEFT FEMALE BREAST, UNSPECIFIED ESTROGEN RECEPTOR STATUS (HCC): Primary | ICD-10-CM

## 2019-01-30 LAB
ALBUMIN SERPL-MCNC: 4.4 G/DL (ref 3.5–5.2)
ALBUMIN/GLOB SERPL: 1.5 G/DL (ref 1.1–2.4)
ALP SERPL-CCNC: 105 U/L (ref 38–116)
ALT SERPL W P-5'-P-CCNC: 18 U/L (ref 0–33)
ANION GAP SERPL CALCULATED.3IONS-SCNC: 12 MMOL/L
AST SERPL-CCNC: 30 U/L (ref 0–32)
BASOPHILS # BLD AUTO: 0.05 10*3/MM3 (ref 0–0.1)
BASOPHILS NFR BLD AUTO: 0.8 % (ref 0–1.1)
BILIRUB SERPL-MCNC: 0.2 MG/DL (ref 0.1–1.2)
BUN BLD-MCNC: 22 MG/DL (ref 6–20)
BUN/CREAT SERPL: 26.2 (ref 7.3–30)
CALCIUM SPEC-SCNC: 9.5 MG/DL (ref 8.5–10.2)
CHLORIDE SERPL-SCNC: 103 MMOL/L (ref 98–107)
CO2 SERPL-SCNC: 25 MMOL/L (ref 22–29)
CREAT BLD-MCNC: 0.84 MG/DL (ref 0.6–1.1)
DEPRECATED RDW RBC AUTO: 46.3 FL (ref 37–49)
EOSINOPHIL # BLD AUTO: 0.17 10*3/MM3 (ref 0–0.36)
EOSINOPHIL NFR BLD AUTO: 2.9 % (ref 1–5)
ERYTHROCYTE [DISTWIDTH] IN BLOOD BY AUTOMATED COUNT: 14.3 % (ref 11.7–14.5)
GFR SERPL CREATININE-BSD FRML MDRD: 69 ML/MIN/1.73
GLOBULIN UR ELPH-MCNC: 3 GM/DL (ref 1.8–3.5)
GLUCOSE BLD-MCNC: 98 MG/DL (ref 74–124)
HCT VFR BLD AUTO: 34.3 % (ref 34–45)
HGB BLD-MCNC: 10.5 G/DL (ref 11.5–14.9)
IMM GRANULOCYTES # BLD AUTO: 0.02 10*3/MM3 (ref 0–0.03)
IMM GRANULOCYTES NFR BLD AUTO: 0.3 % (ref 0–0.5)
LYMPHOCYTES # BLD AUTO: 0.92 10*3/MM3 (ref 1–3.5)
LYMPHOCYTES NFR BLD AUTO: 15.5 % (ref 20–49)
MCH RBC QN AUTO: 27.3 PG (ref 27–33)
MCHC RBC AUTO-ENTMCNC: 30.6 G/DL (ref 32–35)
MCV RBC AUTO: 89.1 FL (ref 83–97)
MONOCYTES # BLD AUTO: 0.38 10*3/MM3 (ref 0.25–0.8)
MONOCYTES NFR BLD AUTO: 6.4 % (ref 4–12)
NEUTROPHILS # BLD AUTO: 4.38 10*3/MM3 (ref 1.5–7)
NEUTROPHILS NFR BLD AUTO: 74.1 % (ref 39–75)
NRBC BLD AUTO-RTO: 0 /100 WBC (ref 0–0)
PLATELET # BLD AUTO: 313 10*3/MM3 (ref 150–375)
PMV BLD AUTO: 8.6 FL (ref 8.9–12.1)
POTASSIUM BLD-SCNC: 4.5 MMOL/L (ref 3.5–4.7)
PROT SERPL-MCNC: 7.4 G/DL (ref 6.3–8)
RBC # BLD AUTO: 3.85 10*6/MM3 (ref 3.9–5)
SODIUM BLD-SCNC: 140 MMOL/L (ref 134–145)
WBC NRBC COR # BLD: 5.92 10*3/MM3 (ref 4–10)

## 2019-01-30 PROCEDURE — 36415 COLL VENOUS BLD VENIPUNCTURE: CPT | Performed by: INTERNAL MEDICINE

## 2019-01-30 PROCEDURE — 99213 OFFICE O/P EST LOW 20 MIN: CPT | Performed by: SURGERY

## 2019-01-30 PROCEDURE — 99215 OFFICE O/P EST HI 40 MIN: CPT | Performed by: INTERNAL MEDICINE

## 2019-01-30 PROCEDURE — 85025 COMPLETE CBC W/AUTO DIFF WBC: CPT

## 2019-01-30 PROCEDURE — 80053 COMPREHEN METABOLIC PANEL: CPT

## 2019-01-30 RX ORDER — DOXORUBICIN HYDROCHLORIDE 2 MG/ML
60 INJECTION, SOLUTION INTRAVENOUS ONCE
Status: CANCELLED | OUTPATIENT
Start: 2019-02-07

## 2019-01-30 RX ORDER — SODIUM CHLORIDE 9 MG/ML
250 INJECTION, SOLUTION INTRAVENOUS ONCE
Status: CANCELLED | OUTPATIENT
Start: 2019-02-07

## 2019-01-30 RX ORDER — PALONOSETRON 0.05 MG/ML
0.25 INJECTION, SOLUTION INTRAVENOUS ONCE
Status: CANCELLED | OUTPATIENT
Start: 2019-02-07

## 2019-01-30 NOTE — PROGRESS NOTES
Subjective   Marylu Georges is a 59 y.o. female who returns to the office for locally advanced breast cancer.    History of Present Illness     The patient was recently diagnosed with a left breast locally advanced tumor replacing most of the breast and with ulceration of the skin and bleeding.  She also appears to have a right breast mass with obvious lymphadenopathy.  She will require chemotherapy as an initial treatment.  She has poor venous access.    Review of Systems   Constitutional: Negative for activity change, appetite change, fatigue and fever.   HENT: Negative for trouble swallowing and voice change.    Respiratory: Negative for chest tightness and shortness of breath.    Cardiovascular: Negative for chest pain and palpitations.   Gastrointestinal: Negative for abdominal pain, blood in stool, constipation, diarrhea, nausea and vomiting.   Endocrine: Negative for cold intolerance and heat intolerance.   Genitourinary: Negative for dysuria and flank pain.   Neurological: Negative for dizziness and light-headedness.   Hematological: Negative for adenopathy. Does not bruise/bleed easily.   Psychiatric/Behavioral: Negative for agitation and confusion.     Past Medical History:   Diagnosis Date   • Arthritis    • Breast cancer (CMS/HCC)     Left   • History of fracture of leg 1987     Past Surgical History:   Procedure Laterality Date   • FRACTURE SURGERY  1987    Leg   • LEG SURGERY Left      Family History   Problem Relation Age of Onset   • Lung cancer Father    • Cancer Mother    • Breast cancer Mother    • Liver disease Mother    • Breast cancer Sister 48   • Breast cancer Maternal Grandmother    • Breast cancer Paternal Grandmother    • Breast cancer Maternal Uncle      Social History     Socioeconomic History   • Marital status:      Spouse name: Cruz   • Number of children: 0   • Years of education: Not on file   • Highest education level: Not on file   Social Needs   • Financial resource  strain: Not hard at all   • Food insecurity - worry: Never true   • Food insecurity - inability: Never true   • Transportation needs - medical: No   • Transportation needs - non-medical: No   Occupational History   • Occupation:      Employer: SELF-EMPLOYED   Tobacco Use   • Smoking status: Never Smoker   • Smokeless tobacco: Never Used   Substance and Sexual Activity   • Alcohol use: Yes     Alcohol/week: 4.2 oz     Types: 4 Glasses of wine, 3 Cans of beer per week     Frequency: 2-3 times a week     Drinks per session: 1 or 2     Binge frequency: Never   • Drug use: No   • Sexual activity: Not Currently     Partners: Male     Birth control/protection: Post-menopausal   Other Topics Concern   • Not on file   Social History Narrative   • Not on file       Objective   Physical Exam   Constitutional: She is oriented to person, place, and time. She appears well-developed and well-nourished. She is cooperative.  Non-toxic appearance.   Eyes: EOM are normal. No scleral icterus.   Pulmonary/Chest: Effort normal. No respiratory distress.   There are no abnormalities involving the upper chest her shoulders.   Neurological: She is alert and oriented to person, place, and time.   Skin: Skin is warm and dry.   Psychiatric: She has a normal mood and affect. Her behavior is normal. Judgment and thought content normal.       Assessment/Plan       The primary encounter diagnosis was Malignant neoplasm of overlapping sites of left female breast, unspecified estrogen receptor status (CMS/HCC). A diagnosis of Poor venous access was also pertinent to this visit.    The patient has locally advanced breast cancer that will require chemotherapy.  She also has poor venous access.  She has been scheduled for a Mediport.  The patient understands the indications, alternatives, risks, and benefits of the procedure and wishes to proceed.

## 2019-01-30 NOTE — PROGRESS NOTES
Subjective     REASON FOR FOLLOW UP:  1. 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 R BREAST MASS WITH GIANT R AXILLARY ADENOPATHY.  3. FAMILY HISTORY OF BREAST CANCER IN MOTHER AND SISTER, SISTER WAS TESTED FOR BRCA AND WAS NEGATIVE.                                       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 She typically goes to multiple places throughout the [place]. In any event, she has been referred to me because the patient has had a mass in the left breast at least for 3 or 4 years that has been slowly growing until became giant and ulcerated and has been having bleeding at least for the last 2 or 3 months. The patient is having pain, especially in the periphery of the tumor. The breast has become so big that she has difficulty dealing with this anymore. She also has noticed that in the right axilla she has developed a huge mass that is not painful that has been present at least and growing steadily for the last 3 or 4 months. She has not had any palpable abnormalities in the right breast but she is suspicious in an area external to the nipple. The patient denies any skeletal pain. Her appetite has been excellent and her weight is stable. She has not had any cough, sputum production or shortness of breath. Her bowel function and urination are normal. She has not had any vaginal bleeding or discharge. She has not had any other skin lesions. She denies any neurological symptomatology.      The patient is extremely committed to her animals, living with the horses, and she has forgotten that she has to take care of herself. She has not been seen by a doctor in more than 20 years. She occasionally is seen by doctors available in [place] for a Pap smear here or a Zithromax pack there but other than that she has no  regular followup of any kind.       The patient returned to the office in company of her brother on 01/30/2019 after proceeding with a multitude of radiological analysis as well as biopsy of her breast that was done yesterday and obviously the pathology report is not available yet.  Also, the patient’s case was presented in the multidisciplinary cancer conference last Friday.       In the meantime, the patient has continued having bleeding through her left breast in a small amount and experiencing mild discomfort, but no significant pain.  She has not had any complications from the breast biopsy yesterday.    She continues having discomfort in the hip and we documented what goes on there and fortunately nothing to suggest cancer.  It is just degenerative disease.        Past Medical History:   Diagnosis Date   • Arthritis    • Breast cancer (CMS/HCC)     Left   • History of fracture of leg 1987           Past Surgical History:   Procedure Laterality Date   • FRACTURE SURGERY  1987    Leg   • LEG SURGERY Left         Current Outpatient Medications on File Prior to Visit   Medication Sig Dispense Refill   • diclofenac sodium (VOTAREN XR) 100 MG 24 hr tablet Take 100 mg by mouth Daily.     • IBUPROFEN PO Take  by mouth.     • Krill Oil 1000 MG capsule Take  by mouth.     • coenzyme Q10 100 MG capsule Take 100 mg by mouth Daily.       Current Facility-Administered Medications on File Prior to Visit   Medication Dose Route Frequency Provider Last Rate Last Dose   • [COMPLETED] lidocaine (XYLOCAINE) 1 % injection 10 mL  10 mL Intradermal Once Juan J Guevara Jr., MD   5 mL at 01/29/19 1425   • [COMPLETED] lidocaine (XYLOCAINE) 1 % injection 20 mL  20 mL Intradermal Once Juan J Guevara Jr., MD   20 mL at 01/29/19 1425   • [COMPLETED] lidocaine-EPINEPHrine (XYLOCAINE W/EPI) 1 %-1:327927 injection 10 mL  10 mL Injection Once Juan J Guevara Jr., MD   5 mL at 01/29/19 1420   • [DISCONTINUED] lidocaine-EPINEPHrine  (XYLOCAINE W/EPI) 1 %-1:200490 injection 10 mL  10 mL Infiltration Once Elie Dioxn MD            ALLERGIES:    Allergies   Allergen Reactions   • Erythromycin GI Intolerance   • Penicillins GI Intolerance        Social History     Socioeconomic History   • Marital status:      Spouse name: Cruz   • Number of children: 0   • Years of education: Not on file   • Highest education level: Not on file   Social Needs   • Financial resource strain: Not hard at all   • Food insecurity - worry: Never true   • Food insecurity - inability: Never true   • Transportation needs - medical: No   • Transportation needs - non-medical: No   Occupational History   • Occupation:      Employer: SELF-EMPLOYED   Tobacco Use   • Smoking status: Never Smoker   • Smokeless tobacco: Never Used   Substance and Sexual Activity   • Alcohol use: Yes     Alcohol/week: 4.2 oz     Types: 4 Glasses of wine, 3 Cans of beer per week     Frequency: 2-3 times a week     Drinks per session: 1 or 2     Binge frequency: Never   • Drug use: No   • Sexual activity: Not Currently     Partners: Male     Birth control/protection: Post-menopausal        Family History   Problem Relation Age of Onset   • Lung cancer Father    • Cancer Mother    • Breast cancer Mother    • Liver disease Mother    • Breast cancer Sister 48   • Breast cancer Maternal Grandmother    • Breast cancer Paternal Grandmother    • Breast cancer Maternal Uncle         Review of Systems         General: no fever, no chills, no fatigue,no weight changes, no lack of appetite.  Eyes: no epiphora, xerophthalmia,conjunctivitis, pain, glaucoma, blurred vision, blindness, secretion, photophobia, proptosis, diplopia.  Ears: no otorrhea, tinnitus, otorrhagia, deafness, pain, vertigo.  Nose: no rhinorrhea, no epistaxis, no alteration in perception of odors, no sinuses pressure.  Mouth: no alteration in gums or teeth,  No ulcers, no difficulty with mastication or deglut ion, no  "odynophagia.  Neck: no masses or pain, no thyroid alterations, no pain in muscles or arteries, no carotid odynia, no crepitation.  Respiratory: no cough, no sputum production,no dyspnea,no trepopnea, no pleuritic pain,no hemoptysis.  Heart: no syncope, no irregularity, no palpitations, no angina,no orthopnea,no paroxysmal nocturnal dyspnea.  Vascular Venous: no tenderness,no edema,no palpable cords,no postphlebitic syndrome, no skin changes no ulcerations.  Vascular Arterial: no distal ischemia, noclaudication, no gangrene, no neuropathic ischemic pain, no skin ulcers, no paleness no cyanosis.  GI: no dysphagia, no odynophagia, no regurgitation, no heartburn,no indigestion,no nausea,no vomiting,no hematemesis ,no melena,no jaundice,no distention, no obstipation,no enterorrhagia,no proctalgia,no anal  lesions, no changes in bowel habits.  : no frequency, no hesitancy, no hematuria, no discharge,no  pain.  Musculoskeletal: stated hip muscle or tendon pain or inflammation,no  joint pain, no edema, no functional limitation,no fasciculations, no mass.  Neurologic: no headache, no seizures, noalterations on Craneal nerves, no motor deficit, no sensory deficit, normal coordination, no alteration in memory,normal orientation, calculation,normal writting, verbal and written language.  Skin: no rashes,no pruritus no localized lesions.  Psychiatric: no anxiety, no depression,no agitation, no delusions, proper insight.      Objective     Vitals:    01/30/19 0843   BP: 129/79   Pulse: 76   Resp: 14   Temp: 98.6 °F (37 °C)   TempSrc: Oral   SpO2: 97%   Weight: 74.4 kg (164 lb)   Height: 163 cm (64.17\")   PainSc: 2  Comment: arthritis pain, RT hip is more painful     Current Status 1/30/2019   ECOG score 0       Physical Exam    GENERAL:  Well-developed, well-nourished  Patient  in no acute distress.   SKIN:  Warm, dry ,NO rashes,NO purpura ,NO petechiae.  HEENT:  Pupils were equal and reactive to light and accomodation, " conjunctivas non injected, no pterigion, normal extraocular movements, normal visual acuity.   Mouth mucosa was moist, no exudates in oropharynx, normal gum line, normal roof of the mouth and pillars, normal papillations of the tongue.  NECK:  Supple with good range of motion; no thyromegaly or masses, no JVD or bruits, no cervical adenopathies.No carotid arteries pain, no carotid abnormal pulsation , NO arterial dance.  LYMPHATICS:  No cervical, NO supraclavicular, NO axillary,NO epitrochlear , NO inguinal adenopathy.  CHEST:  Normal excursion of both luz thoraces, normal voice fremitus, no subcutaneous emphysema, normal axillas, no rashes or acanthosis nigricans. Lungs clear to percussion and auscultation, normal breath sounds bilaterally, no wheezing,NO crackles NO ronchi, NO stridor, NO rubs.  CARDIAC AND VASCULAR:  normal rate and regular rhythm, without murmurs,NO rubs NO S3 NO S4 right or left . Normal femoral, popliteal, pedis, brachial and carotid pulses.  INSPECTION of R breast documented symmetry of the tissue per se and location and size of the nipple,no retractions or inversion of the nipple, normal skin without lesions, no erythema or nodules,no paud'orange, no prominence of superficial veins or chest wall collateral circulation.PALPATION of the breast documented normal skin turgor, no induration, alteration in local temperature, or pain, 9 O'CLOCK 4 CM DISCRETE MASS palpable, normal mobility of the tissues,no fixation of the tissue or parenchyma to the chest wall, MAJOR alteration at the tail of the breasts RIGHT 9 CM UNIFORM FIXED NONE TENDER NOT FLUCTUANT adenopathY. On the left breast, the whole breast is replaced by a huge mass that is close to 25 cm in diameter with a large area of ulceration that measures close to 15 cm in diameter with central area of necrosis and oozing of material that is gelatinous and nonodorous. There is minimal erythema at the edges of the tumor and the mass is fixed to  the chest wall, nonmobile. There is abundant amount of collateral circulation in the anterior chest wall superiorly on the left hemithorax. Significant enough there is no obvious visible or palpable adenopathies in the left axilla. There is no lymphedema in either upper extremity  ABDOMEN:  Soft, nontender with no organomegaly or masses, no ascites, no collateral circulation,no distention,no Vernon sign, no abdominal pain, no inguinal hernias,no umbilical hernia, no abdominal bruits. Normal bowel sounds.  GENITAL: Not  Performed.  EXTREMITIES  AND SPINE:  No clubbing, cyanosis or edema, no deformities or pain .No kyphosis, scoliosis, deformities or pain in spine, ribs or pelvic bone.  NEUROLOGICAL:  Patient was awake, alert, oriented to time, person and place.              RECENT LABS:  Hematology WBC   Date Value Ref Range Status   01/30/2019 5.92 4.00 - 10.00 10*3/mm3 Final     RBC   Date Value Ref Range Status   01/30/2019 3.85 (L) 3.90 - 5.00 10*6/mm3 Final     Hemoglobin   Date Value Ref Range Status   01/30/2019 10.5 (L) 11.5 - 14.9 g/dL Final     Hematocrit   Date Value Ref Range Status   01/30/2019 34.3 34.0 - 45.0 % Final     Platelets   Date Value Ref Range Status   01/30/2019 313 150 - 375 10*3/mm3 Final      CT CHEST W CONTRAST-, CT ABDOMEN PELVIS W CONTRAST-     CLINICAL HISTORY: Newly diagnosed extensive left breast carcinoma.  Initial staging. Evaluate for metastatic disease.     TECHNIQUE: Spiral CT images were obtained through the chest abdomen and  pelvis with oral and IV contrast and were reconstructed in 3 mm thick  slices.     Radiation dose reduction techniques were utilized, including automated  exposure control and exposure modulation based on body size.     COMPARISON: None     FINDINGS: There is a very large lobulated mass within the upper aspect  of the left breast that measures approximately 12.3 x 4.0 x 8.8 cm in  diameter. Posteriorly, the tumor abuts and likely infiltrates the  chest  wall musculature. It extends into the skin anteriorly. There is diffuse  skin thickening within the left breast inferior to the mass consistent  with lymphovascular infiltration. Multiple additional small enhancing  tumor nodules are also evident within the left breast inferior to the  dominant mass consistent with metastatic disease. There are also  multiple small enhancing solid nodules scattered throughout the right  breast suspicious for metastatic disease or additional primary breast  neoplasms. These measure up to 1.7 cm in diameter. Further evaluation  with mammography and breast ultrasound suggested. There are multiple  significantly enlarged bilateral axillary lymph nodes consistent with  metastatic disease. The largest lymph node on the left measures 4.0 cm  in diameter. The largest lymph node on the right measures up to 5.2 cm  in diameter. No definite internal mammary chain lymphadenopathy is  identified. There is no mediastinal or hilar lymphadenopathy. Lung  window images demonstrate a solitary punctate noncalcified nodule in the  lateral aspect of the left lower lobe measuring 2 to 3 mm that is  statistically quite likely benign and are otherwise unremarkable. There  are no pleural effusions. There is a tiny cyst in the lateral segment of  the left lobe of the liver. The liver is otherwise unremarkable. The  spleen and pancreas and kidneys and adrenal glands appear within normal  limits. The stomach and small and large bowel are unremarkable. There is  a 5.5 x 4.1 cm diameter unilocular cyst in the left adnexa that is most  likely within the ovary. Further evaluation with a pelvic ultrasound  suggested. Bone window images demonstrate no lytic or blastic lesions.     IMPRESSION:  There is a large lobulated solid mass in the left breast  infiltrating the skin. Additional small tumor nodules are evident in the  inferior aspect of the left breast. There are also small masses in the  right breast  that could be neoplastic in etiology. There is fairly  extensive bilateral axillary lymphadenopathy consistent with metastatic  disease. There is no compelling evidence of metastatic disease elsewhere  within the chest or within the abdomen and pelvis. A single very tiny  punctate noncalcified pulmonary nodule is noted in the left lower lobe.  A unilocular cystic mass in the left adnexa likely arises from the  ovary. A pelvic ultrasound may be helpful for further evaluation.     This report was finalized on 1/24/2019 11:09 AM by Dr. Sridhar Bee M.D.     MRI BRAIN WITH AND WITHOUT CONTRAST      HISTORY: Breast cancer, evaluate for metastatic disease.     COMPARISON: None.     TECHNIQUE: A MRI examination of the brain was performed before and after  the intravenous administration of contrast utilizing sagittal T1, axial  diffusion, T1, T2, T2 FLAIR and sagittal, axial and coronal T1  postcontrast weighted sequences.     FINDINGS: There is no evidence of restricted diffusion to suggest acute  infarction. The brain and ventricles are symmetrical. The axial T2  sequence demonstrates expected flow-void in the basilar artery and in  the distal aspect of the internal carotid arteries bilaterally. The left  vertebral artery is dominant. The basilar artery is somewhat diminutive  in caliber as there appear to be fetal origins of the posterior cerebral  arteries bilaterally. There is no evidence of mass, mass effect or of  abnormal enhancement to suggest metastatic disease.     IMPRESSION:  No evidence of metastatic disease. Relatively isolated  posterior circulation supplied by the left vertebral artery.     This report was finalized on 1/25/2019 5:37 PM by Dr. Luis M Kaur M.D.  NUCLEAR MEDICINE WHOLE BODY BONE SCAN     HISTORY: Newly diagnosed breast cancer. Evaluate for metastatic disease.     TECHNIQUE:  23.4 mCi of 99m technetium MDP was administered IV followed  by 3 hour delayed phase whole body imaging with  selected spot views.     COMPARISON: CT chest, abdomen and pelvis 01/24/2019.     FINDINGS:  There is increased uptake within both acetabula. This is  greater on the right. Review of CT demonstrates subchondral cyst  formation in the acetabular roofs bilaterally. There is also sclerosis  within the right acetabular roof and this may be degenerative as no  additional abnormal foci of uptake are present to support that this  represents metastatic disease. There is degenerative uptake at both  patellofemoral joints and within both first MTP joints within the left  midfoot and both shoulders and sternoclavicular joints. Mild  dextroscoliotic curvature is present in the lumbar spine where there is  degenerative uptake. Both kidneys and the urinary bladder are  visualized. There is soft tissue uptake overlying the left breast /  anterior chest wall consistent with a breast mass.     IMPRESSION:  1. No convincing scintigraphic evidence for bony metastatic disease.  There is increased acetabular uptake, greater on the right. Sclerosis  and subchondral cysts are present in the right acetabular roof on CT and  this suspected to be arthritic though follow-up bone scan recommended.  2. Increased soft tissue uptake anterior left chest / left breast at  site of the breast mass.     This report was finalized on 1/24/2019 2:26 PM by Dr. Andrae Rivas M.D.     Contains abnormal data Cancer Antigen 15-3   Order: [id number]   Status:  Final result   Visible to patient:  Yes (MyChart)   Dx:  Malignant neoplasm of overlapping sit...    Ref Range & Units 8d ago   CA 15-3 <=25.0 U/mL 44.1 Abnormally high     Resulting Agency  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 At least for the  last 4 years, she has had an in crescendo mass in the left breast that has produced a gigantic tumor that is now close to 25 cm in size and is replacing most of the anatomy of the left breast. There are areas of ulceration necrosis and bleeding and the mass is fixed to the chest wall. She has abundant amount of collateral circulation in the left anterior chest wall and it caught my attention the lack of any left axillary adenopathy. She has no lymphedema in the left upper extremity. In the right breast while in sitting position, no palpable abnormalities are documented. The right breast skin and nipple are normal. When the patient lies flat, there is a palpable mass at 9 o'clock from the nipple that measures probably 4 cm in size, very indistinct in regard to edges and margins. There was no mobility and no areas of tenderness. She has a giant adenopathy in the right axilla that measures close to 9 cm in size, uniform, no fluctuation, nontender, completely fixed to the axillary wall. There is no compromise of the skin.     There is no supraclavicular or infraclavicular adenopathies. The rest of her physical examination is normal.     The degree of comorbidities in this patient are minimal with the exception of minimal osteoarthritis in the fingers of her hands. She typically does not take any medicines and obviously, she has neglected herself at least for the last 20 years in regard to medical care.     The patient also has a component of anemia that is very likely anemia of neoplastic disease.     Obviously, this patient has probably bilateral breast cancers. She has a right axillary adenopathy. She has dramatic compromise of the left breast and she has ulceration necrosis and oozing from this site. She has pain but she is tough enough that she does not want to take any pain medicine.       The patient also has a strong family history of breast cancer in her mother who developed breast cancer when she was 40; her  sister developed breast cancer when she was pregnant. The patient had a BRCA analysis that was negative. Nevertheless, the family history extends furthermore into more family members with breast cancer.     I have reviewed personally in the [facility] the MRI scan of the brain, CT scan of the chest, abdomen and pelvis and bone scan.  All these tests show no evidence brain metastases, no evidence of bone metastases and no evidence of pulmonary or liver metastases.  The tumoral lesions in the left breast are huge and she has a large pectoralis lymph node that is very evident radiologically.  She has nodularity in the right breast suggestive of multifocal breast cancer and large right axillary adenopathy.    She underwent biopsy of her breast yesterday and obviously pathology is not available yet.    The patient has a CA 15-3 that is elevated at 44 and not surprising for the amount of bulky tumor she has in the left breast.     So far we are very dinora we have not encountered brain, bone or liver metastases in this patient or pulmonary metastases.  On the other hand, she has very bulky disease.  Again, this has been neglected for many years.     On top of that the patient has a low ferritin and low iron, her diet is appropriate in regard to proteins with turkey meat and so forth and I do believe that this is probably related to iron deficiency triggered by significant bleeding in her breast and I have no other excuse.      Obviously, we are waiting to review the pathology of the biopsy of the breast for further information.     The patient’s case was presented in the multidisciplinary breast cancer conference last week and basically the principle is that she needs to be treated aggressively for her malignancy with chemotherapy.  My plan will be to deliver A/C x4, each treatment every 3 weeks and using Neulasta Onpro.  Also, thereafter she will proceed with Taxol weekly for 12 weeks of  treatment.  She will be rechecked on clinical grounds at each one of the visits to see how the breast and axillary nodes are doing on the right side and eventually we will check her radiologically as well.     Once we get the final pathology we will make further plans for future adjuvant hormonal therapy.  The patient realizes that eventually after completion of her chemotherapy if she has had a very substantial neoadjuvant response maybe she could become a candidate for removal of the tumors and subsequently radiation therapy.      I discussed with her the side effects of the treatment for Cytoxan and Adriamycin including alopecia, anemia, leukopenia, thrombocytopenia, nausea, vomiting, hemorrhagic cystitis among others and for the Adriamycin, cardiomyopathy.  In preparation for cardiac ejection fraction it is impossible for this patient to have a heart ultrasound given the complete compromise of the right chest wall by malignancy.  Therefore, I am going to go ahead and schedule a MUGA ejection fraction.      I discussed with her also the side effects of Taxol including neuropathy.    The patient will have formal chemotherapy teaching, education and consent and has been referred to see Joby Barron MD in order to proceed with placement of a port in the right subclavian position.     I discussed all these facts with the patient and her brother in detail. I told her we are going to be as aggressive as we can under the present circumstances to see if we can clean her disease as much as we can and maybe make her become a surgical candidate down the road and deliver further therapy depending on the circumstances.  We will be alert to review the pathological analysis of the specimen obtained yesterday.    She has iron deficiency anemia , b12 and folate were normal , she will start ferrous sulfate 325 mg every other day

## 2019-01-31 ENCOUNTER — APPOINTMENT (OUTPATIENT)
Dept: LAB | Facility: HOSPITAL | Age: 59
End: 2019-01-31

## 2019-01-31 ENCOUNTER — OFFICE VISIT (OUTPATIENT)
Dept: ONCOLOGY | Facility: CLINIC | Age: 59
End: 2019-01-31

## 2019-01-31 VITALS — BODY MASS INDEX: 27.91 KG/M2 | WEIGHT: 163.5 LBS

## 2019-01-31 DIAGNOSIS — C50.812 MALIGNANT NEOPLASM OF OVERLAPPING SITES OF LEFT FEMALE BREAST, UNSPECIFIED ESTROGEN RECEPTOR STATUS (HCC): Primary | ICD-10-CM

## 2019-01-31 PROCEDURE — 99215 OFFICE O/P EST HI 40 MIN: CPT | Performed by: NURSE PRACTITIONER

## 2019-01-31 RX ORDER — ONDANSETRON HYDROCHLORIDE 8 MG/1
8 TABLET, FILM COATED ORAL 3 TIMES DAILY PRN
Qty: 30 TABLET | Refills: 5 | Status: SHIPPED | OUTPATIENT
Start: 2019-01-31 | End: 2019-05-03

## 2019-01-31 RX ORDER — ACETAMINOPHEN 500 MG
500 TABLET ORAL EVERY 6 HOURS PRN
COMMUNITY
End: 2019-07-26

## 2019-01-31 RX ORDER — FERROUS SULFATE 325(65) MG
325 TABLET ORAL EVERY OTHER DAY
Qty: 15 TABLET | Refills: 1 | Status: SHIPPED | OUTPATIENT
Start: 2019-01-31 | End: 2019-07-01

## 2019-01-31 RX ORDER — DEXAMETHASONE 4 MG/1
TABLET ORAL
Qty: 6 TABLET | Refills: 3 | Status: SHIPPED | OUTPATIENT
Start: 2019-01-31 | End: 2019-05-03

## 2019-01-31 NOTE — PROGRESS NOTES
____________________PATIENT EDUCATION____________________    PATIENT EDUCATION:  Today I met with the patient to discuss the chemotherapy regimen recommended for treatment of his disease.  The patient was given explanation of treatment premed side effects including office policy that prohibits patients to drive if sedating medications are administered, MD explanation given regarding benefits, side effects, toxicities and goals of treatment.  The patient received a Chemotherapy/Biotherapy Plan Summary including diagnosis and specific treatment plan.    SIDE EFFECTS:  Common side effects were discussed with the patient and/or significant other.  Discussion included hair loss/discoloration, anemia/fatigue, infection/chills/fever, appetite, bleeding risk/precautions, constipation, diarrhea, mouth sores, taste alteration, loss of appetite,nausea/vomiting, peripheral neuropathy, skin/nail changes, rash, muscle aches/weakness, photosensitivity, weight gain/loss, hearing loss, dizziness, menopausal symptoms, menstrrual irregularity, sterility, high blood pressure, heart damage, liver damage, lung damage, kidney damage, DVT/PE risk, fluid retention, pleural/pericardial effusion, somnolence, electrolyte/LFT imbalance, vein exercises and/or the possible need for vascular access/port placement.  The patient was advice that although uncommon, leakage of an infused medication from the vein or venous access device (port) may lead to skin breakdown and/or other tissue damage.  The patient was advised that he/she may have pain, bleeding, and/or bruising from the insertion of a needle in their vein or venous access device (port).  The patient was further advised that, in spite of proper technique, infection with redness and irritation may rarely occur at the site where the needle was inserted.  The patient was advised that if complications occur, additional medical treatment is available.    Discussion also included side effects  specific to drugs in the treatment plan, specifically Adriamycin, Cytoxan, Neulasta and Taxol.    Survivorship referral placed if appropriate YES    A total of 65 minutes were spent with the patient, with 100% of time spent in education and counseling.

## 2019-02-01 ENCOUNTER — HOSPITAL ENCOUNTER (OUTPATIENT)
Facility: HOSPITAL | Age: 59
Setting detail: HOSPITAL OUTPATIENT SURGERY
Discharge: HOME | End: 2019-02-01
Attending: SURGERY | Admitting: SURGERY

## 2019-02-01 ENCOUNTER — APPOINTMENT (OUTPATIENT)
Dept: GENERAL RADIOLOGY | Facility: HOSPITAL | Age: 59
End: 2019-02-01

## 2019-02-01 ENCOUNTER — ANESTHESIA (OUTPATIENT)
Dept: PERIOP | Facility: HOSPITAL | Age: 59
End: 2019-02-01

## 2019-02-01 ENCOUNTER — ANESTHESIA EVENT (OUTPATIENT)
Dept: PERIOP | Facility: HOSPITAL | Age: 59
End: 2019-02-01

## 2019-02-01 VITALS
OXYGEN SATURATION: 99 % | WEIGHT: 163 LBS | RESPIRATION RATE: 16 BRPM | TEMPERATURE: 98.4 F | BODY MASS INDEX: 27.83 KG/M2 | SYSTOLIC BLOOD PRESSURE: 128 MMHG | DIASTOLIC BLOOD PRESSURE: 80 MMHG | HEIGHT: 64 IN | HEART RATE: 59 BPM

## 2019-02-01 DIAGNOSIS — C50.812 MALIGNANT NEOPLASM OF OVERLAPPING SITES OF LEFT FEMALE BREAST, UNSPECIFIED ESTROGEN RECEPTOR STATUS (HCC): ICD-10-CM

## 2019-02-01 DIAGNOSIS — I87.8 POOR VENOUS ACCESS: ICD-10-CM

## 2019-02-01 PROBLEM — Z45.2 FITTING AND ADJUSTMENT OF VASCULAR CATHETER: Status: ACTIVE | Noted: 2019-02-01

## 2019-02-01 PROCEDURE — 36561 INSERT TUNNELED CV CATH: CPT | Performed by: SURGERY

## 2019-02-01 PROCEDURE — 25010000002 FENTANYL CITRATE (PF) 100 MCG/2ML SOLUTION: Performed by: ANESTHESIOLOGY

## 2019-02-01 PROCEDURE — 25010000002 MIDAZOLAM PER 1 MG: Performed by: ANESTHESIOLOGY

## 2019-02-01 PROCEDURE — 77001 FLUOROGUIDE FOR VEIN DEVICE: CPT | Performed by: SURGERY

## 2019-02-01 PROCEDURE — 25010000002 PROPOFOL 10 MG/ML EMULSION: Performed by: NURSE ANESTHETIST, CERTIFIED REGISTERED

## 2019-02-01 PROCEDURE — C1788 PORT, INDWELLING, IMP: HCPCS | Performed by: SURGERY

## 2019-02-01 PROCEDURE — 25010000003 CEFAZOLIN IN DEXTROSE 2-4 GM/100ML-% SOLUTION: Performed by: SURGERY

## 2019-02-01 PROCEDURE — 25010000002 HEPARIN (PORCINE) PER 1000 UNITS: Performed by: SURGERY

## 2019-02-01 DEVICE — POWERPORT M.R.I. IMPLANTABLE PORT WITH ATTACHABLE 8F CHRONOFLEX OPEN-ENDED SINGLE-LUMEN VENOUS CATHETER INTERMEDIATE KIT (WITH SUTURE PLUGS)
Type: IMPLANTABLE DEVICE | Site: CHEST | Status: FUNCTIONAL
Brand: POWERPORT M.R.I., CHRONOFLEX

## 2019-02-01 RX ORDER — LIDOCAINE HYDROCHLORIDE 20 MG/ML
INJECTION, SOLUTION INFILTRATION; PERINEURAL AS NEEDED
Status: DISCONTINUED | OUTPATIENT
Start: 2019-02-01 | End: 2019-02-01 | Stop reason: SURG

## 2019-02-01 RX ORDER — OXYCODONE HYDROCHLORIDE AND ACETAMINOPHEN 5; 325 MG/1; MG/1
TABLET ORAL
Qty: 20 TABLET | Refills: 0 | Status: SHIPPED | OUTPATIENT
Start: 2019-02-01 | End: 2019-08-20

## 2019-02-01 RX ORDER — FENTANYL CITRATE 50 UG/ML
50 INJECTION, SOLUTION INTRAMUSCULAR; INTRAVENOUS
Status: DISCONTINUED | OUTPATIENT
Start: 2019-02-01 | End: 2019-02-01 | Stop reason: HOSPADM

## 2019-02-01 RX ORDER — PROPOFOL 10 MG/ML
VIAL (ML) INTRAVENOUS CONTINUOUS PRN
Status: DISCONTINUED | OUTPATIENT
Start: 2019-02-01 | End: 2019-02-01 | Stop reason: SURG

## 2019-02-01 RX ORDER — SODIUM CHLORIDE, SODIUM LACTATE, POTASSIUM CHLORIDE, CALCIUM CHLORIDE 600; 310; 30; 20 MG/100ML; MG/100ML; MG/100ML; MG/100ML
9 INJECTION, SOLUTION INTRAVENOUS CONTINUOUS
Status: DISCONTINUED | OUTPATIENT
Start: 2019-02-01 | End: 2019-02-01 | Stop reason: HOSPADM

## 2019-02-01 RX ORDER — MIDAZOLAM HYDROCHLORIDE 1 MG/ML
1 INJECTION INTRAMUSCULAR; INTRAVENOUS
Status: DISCONTINUED | OUTPATIENT
Start: 2019-02-01 | End: 2019-02-01 | Stop reason: HOSPADM

## 2019-02-01 RX ORDER — SODIUM CHLORIDE 0.9 % (FLUSH) 0.9 %
1-10 SYRINGE (ML) INJECTION AS NEEDED
Status: DISCONTINUED | OUTPATIENT
Start: 2019-02-01 | End: 2019-02-01 | Stop reason: HOSPADM

## 2019-02-01 RX ORDER — LIDOCAINE HYDROCHLORIDE AND EPINEPHRINE 10; 10 MG/ML; UG/ML
INJECTION, SOLUTION INFILTRATION; PERINEURAL AS NEEDED
Status: DISCONTINUED | OUTPATIENT
Start: 2019-02-01 | End: 2019-02-01 | Stop reason: HOSPADM

## 2019-02-01 RX ORDER — CEFAZOLIN SODIUM 2 G/100ML
2 INJECTION, SOLUTION INTRAVENOUS ONCE
Status: COMPLETED | OUTPATIENT
Start: 2019-02-01 | End: 2019-02-01

## 2019-02-01 RX ORDER — MIDAZOLAM HYDROCHLORIDE 1 MG/ML
2 INJECTION INTRAMUSCULAR; INTRAVENOUS
Status: DISCONTINUED | OUTPATIENT
Start: 2019-02-01 | End: 2019-02-01 | Stop reason: HOSPADM

## 2019-02-01 RX ORDER — MAGNESIUM HYDROXIDE 1200 MG/15ML
LIQUID ORAL AS NEEDED
Status: DISCONTINUED | OUTPATIENT
Start: 2019-02-01 | End: 2019-02-01 | Stop reason: HOSPADM

## 2019-02-01 RX ORDER — OXYCODONE HYDROCHLORIDE AND ACETAMINOPHEN 5; 325 MG/1; MG/1
TABLET ORAL
Status: COMPLETED
Start: 2019-02-01 | End: 2019-02-01

## 2019-02-01 RX ORDER — SODIUM CHLORIDE 0.9 % (FLUSH) 0.9 %
10 SYRINGE (ML) INJECTION AS NEEDED
Status: CANCELLED | OUTPATIENT
Start: 2019-02-07

## 2019-02-01 RX ORDER — LIDOCAINE HYDROCHLORIDE 10 MG/ML
0.5 INJECTION, SOLUTION EPIDURAL; INFILTRATION; INTRACAUDAL; PERINEURAL ONCE AS NEEDED
Status: DISCONTINUED | OUTPATIENT
Start: 2019-02-01 | End: 2019-02-01 | Stop reason: HOSPADM

## 2019-02-01 RX ORDER — FAMOTIDINE 10 MG/ML
20 INJECTION, SOLUTION INTRAVENOUS ONCE
Status: COMPLETED | OUTPATIENT
Start: 2019-02-01 | End: 2019-02-01

## 2019-02-01 RX ORDER — SODIUM CHLORIDE 9 MG/ML
INJECTION, SOLUTION INTRAVENOUS AS NEEDED
Status: DISCONTINUED | OUTPATIENT
Start: 2019-02-01 | End: 2019-02-01 | Stop reason: HOSPADM

## 2019-02-01 RX ORDER — PROPOFOL 10 MG/ML
VIAL (ML) INTRAVENOUS AS NEEDED
Status: DISCONTINUED | OUTPATIENT
Start: 2019-02-01 | End: 2019-02-01 | Stop reason: SURG

## 2019-02-01 RX ORDER — OXYCODONE HYDROCHLORIDE AND ACETAMINOPHEN 5; 325 MG/1; MG/1
1 TABLET ORAL ONCE
Status: DISCONTINUED | OUTPATIENT
Start: 2019-02-01 | End: 2019-02-01 | Stop reason: HOSPADM

## 2019-02-01 RX ADMIN — LIDOCAINE HYDROCHLORIDE 40 MG: 20 INJECTION, SOLUTION INFILTRATION; PERINEURAL at 11:18

## 2019-02-01 RX ADMIN — FENTANYL CITRATE 100 MCG: 50 INJECTION INTRAMUSCULAR; INTRAVENOUS at 11:17

## 2019-02-01 RX ADMIN — CEFAZOLIN SODIUM 2 G: 2 INJECTION, SOLUTION INTRAVENOUS at 11:16

## 2019-02-01 RX ADMIN — MIDAZOLAM 2 MG: 1 INJECTION INTRAMUSCULAR; INTRAVENOUS at 11:16

## 2019-02-01 RX ADMIN — PROPOFOL 100 MCG/KG/MIN: 10 INJECTION, EMULSION INTRAVENOUS at 11:18

## 2019-02-01 RX ADMIN — FAMOTIDINE 20 MG: 10 INJECTION, SOLUTION INTRAVENOUS at 10:14

## 2019-02-01 RX ADMIN — PROPOFOL 30 MG: 10 INJECTION, EMULSION INTRAVENOUS at 11:32

## 2019-02-01 RX ADMIN — SODIUM CHLORIDE, POTASSIUM CHLORIDE, SODIUM LACTATE AND CALCIUM CHLORIDE: 600; 310; 30; 20 INJECTION, SOLUTION INTRAVENOUS at 11:06

## 2019-02-01 RX ADMIN — SODIUM CHLORIDE, POTASSIUM CHLORIDE, SODIUM LACTATE AND CALCIUM CHLORIDE 9 ML/HR: 600; 310; 30; 20 INJECTION, SOLUTION INTRAVENOUS at 09:09

## 2019-02-01 RX ADMIN — OXYCODONE HYDROCHLORIDE AND ACETAMINOPHEN 1 TABLET: 5; 325 TABLET ORAL at 13:22

## 2019-02-01 NOTE — ANESTHESIA PREPROCEDURE EVALUATION
Anesthesia Evaluation     Patient summary reviewed and Nursing notes reviewed   no history of anesthetic complications:  NPO Solid Status: > 8 hours  NPO Liquid Status: > 2 hours           Airway   Mallampati: II  TM distance: >3 FB  Neck ROM: full  Dental - normal exam     Pulmonary - negative pulmonary ROS and normal exam   Cardiovascular - negative cardio ROS and normal exam  Exercise tolerance: good (4-7 METS)    Rhythm: regular        Neuro/Psych- negative ROS  GI/Hepatic/Renal/Endo - negative ROS     Musculoskeletal     Abdominal  - normal exam    Bowel sounds: normal.   Substance History - negative use     OB/GYN negative ob/gyn ROS         Other   (+) arthritis   history of cancer                    Anesthesia Plan    ASA 3     MAC     intravenous induction   Anesthetic plan, all risks, benefits, and alternatives have been provided, discussed and informed consent has been obtained with: patient.    Plan discussed with CRNA and attending.

## 2019-02-01 NOTE — ANESTHESIA POSTPROCEDURE EVALUATION
Patient: Marylu Georges    Procedure Summary     Date:  02/01/19 Room / Location:   [facility] [address] [place] [address] [facility] [place] [facility] [place]    Anesthesia Start:  1111 Anesthesia Stop:  1212    Procedure:  INSERTION VENOUS ACCESS DEVICE (Right ) Diagnosis:       Malignant neoplasm of overlapping sites of left female breast, unspecified estrogen receptor status (CMS/HCC)      Poor venous access      (Malignant neoplasm of overlapping sites of left female breast, unspecified estrogen receptor status (CMS/HCC) [C50.812])      (Poor venous access [I87.8])    Surgeon:  Joby Barron Jr., MD Provider:  Tony Christianson MD    Anesthesia Type:  MAC ASA Status:  3          Anesthesia Type: MAC  Last vitals  BP   115/82 (02/01/19 1210)   Temp   36.9 °C (98.4 °F) (02/01/19 0857)   Pulse   75 (02/01/19 1210)   Resp   16 (02/01/19 1210)     SpO2   94 % (02/01/19 1210)     Post Anesthesia Care and Evaluation    Patient location during evaluation: bedside  Patient participation: complete - patient participated  Level of consciousness: awake  Pain score: 1  Pain management: adequate  Airway patency: patent  Anesthetic complications: No anesthetic complications    Cardiovascular status: acceptable  Respiratory status: acceptable  Hydration status: acceptable    Comments: --------------------            02/01/19 1210     --------------------   BP:       115/82     Pulse:      75       Resp:       16       Temp:                SpO2:      94%      --------------------

## 2019-02-01 NOTE — OP NOTE
Surgeon: Joby Barron Jr., M.D.    Assistant: None    Pre-Operative Diagnosis:     Malignant neoplasm of overlapping sites of left female breast, unspecified estrogen receptor status (CMS/HCC) [C50.812]  Poor venous access [I87.8]    Post-Operative Diagnosis:    Post-Op Diagnosis Codes:     * Malignant neoplasm of overlapping sites of left female breast, unspecified estrogen receptor status (CMS/HCC) [C50.812]     * Poor venous access [I87.8]    Procedure Performed:     Mediport    Indications:     The patient is a pleasant 59-year-old female with advanced breast cancer who will require chemotherapy and has poor venous access.  She presents for Mediport placement.  The patient understands the indications, alternatives, risks, and benefits of the procedure and wishes to proceed.    Procedure:     The patient was identified and taken to the operating room where she was placed in the supine position on the operating table.  Monitors were placed and she underwent a Mac anesthesia and was appropriately monitored throughout the case by the anesthesia personnel.  The right chest and shoulder were prepped and draped in the standard surgical fashion.  The deltopectoral groove was infiltrated with 1% lidocaine without epinephrine.  A needle was advanced through the deltopectoral groove under the clavicle into the subclavian vein after several passes because the pain was difficult to access.  The syringe was removed from the needle and a guidewire was advanced through the needle and into the superior vena cava which was confirmed by fluoroscopy.  An area on the chest wall just inferior to the puncture site was selected for port placement.  This area was infiltrated with 1% lidocaine without epinephrine.  An incision was made at the superior aspect of the pocket site and carried through the skin into the subcutaneous tissue.  The subcutaneous tissue was divided using Bovie electrocautery to create a pocket to permit the  port.  The original puncture site was enlarged with a scalpel and carried through the skin into the subcutaneous tissue.  The area between the puncture site and a pocket was then tunneled with a hemostat and the catheter was brought through this tunnel.  The dilator and introducer were then placed over the guidewire using the Seldinger technique without difficulty.  The dilator and guidewire were removed and the catheter was placed through the introducer.  The introducer was removed by the peel-away technique.  The catheter was positioned appropriately in the superior vena cava using fluoroscopic guidance.  The catheter was then attached to the Mediport which was placed in the pocket.  The Mediport was aspirated and flushed with heparinized saline.  The Mediport was secured in the pocket using 2-0 Vicryl suture.  The subcutaneous tissue was then reapproximated using 3-0 Vicryl suture.  All skin incisions were closed with a 4-0 Monocryl in a subcuticular fashion followed by benzoin and Steri-Strips.  A sterile dressing was applied.  The sponge, needle, and instrument counts were correct at the end case.  The patient tolerated procedure well and was transferred to the recovery room in stable condition.    Estimated Blood Loss:      minimal    Specimens:     None    Joby Barron Jr., M.D.

## 2019-02-04 ENCOUNTER — HOSPITAL ENCOUNTER (OUTPATIENT)
Dept: NUCLEAR MEDICINE | Facility: HOSPITAL | Age: 59
Discharge: HOME | End: 2019-02-04
Attending: INTERNAL MEDICINE

## 2019-02-04 DIAGNOSIS — C50.812 MALIGNANT NEOPLASM OF OVERLAPPING SITES OF BOTH BREASTS IN FEMALE, ESTROGEN RECEPTOR POSITIVE (HCC): ICD-10-CM

## 2019-02-04 DIAGNOSIS — C50.811 MALIGNANT NEOPLASM OF OVERLAPPING SITES OF BOTH BREASTS IN FEMALE, ESTROGEN RECEPTOR POSITIVE (HCC): ICD-10-CM

## 2019-02-04 DIAGNOSIS — Z17.0 MALIGNANT NEOPLASM OF OVERLAPPING SITES OF BOTH BREASTS IN FEMALE, ESTROGEN RECEPTOR POSITIVE (HCC): ICD-10-CM

## 2019-02-04 PROCEDURE — 0 TECHNETIUM LABELED RED BLOOD CELLS: Performed by: INTERNAL MEDICINE

## 2019-02-04 PROCEDURE — A9560 TC99M LABELED RBC: HCPCS | Performed by: INTERNAL MEDICINE

## 2019-02-04 PROCEDURE — 78472 GATED HEART PLANAR SINGLE: CPT

## 2019-02-04 RX ADMIN — SODIUM CHLORIDE, PRESERVATIVE FREE 30 UNITS: 5 INJECTION INTRAVENOUS at 09:07

## 2019-02-04 RX ADMIN — TECHNETIUM TC 99M-LABELED RED BLOOD CELLS 1 DOSE: KIT at 09:07
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
## 2019-02-07 ENCOUNTER — INFUSION (OUTPATIENT)
Dept: ONCOLOGY | Facility: HOSPITAL | Age: 59
End: 2019-02-07

## 2019-02-07 DIAGNOSIS — C50.811 MALIGNANT NEOPLASM OF OVERLAPPING SITES OF BOTH BREASTS IN FEMALE, ESTROGEN RECEPTOR POSITIVE (HCC): ICD-10-CM

## 2019-02-07 DIAGNOSIS — Z17.0 MALIGNANT NEOPLASM OF OVERLAPPING SITES OF BOTH BREASTS IN FEMALE, ESTROGEN RECEPTOR POSITIVE (HCC): ICD-10-CM

## 2019-02-07 DIAGNOSIS — C50.812 MALIGNANT NEOPLASM OF OVERLAPPING SITES OF LEFT FEMALE BREAST, UNSPECIFIED ESTROGEN RECEPTOR STATUS (HCC): ICD-10-CM

## 2019-02-07 DIAGNOSIS — C50.812 MALIGNANT NEOPLASM OF OVERLAPPING SITES OF LEFT FEMALE BREAST, UNSPECIFIED ESTROGEN RECEPTOR STATUS (HCC): Primary | ICD-10-CM

## 2019-02-07 DIAGNOSIS — C50.812 MALIGNANT NEOPLASM OF OVERLAPPING SITES OF BOTH BREASTS IN FEMALE, ESTROGEN RECEPTOR POSITIVE (HCC): ICD-10-CM

## 2019-02-07 DIAGNOSIS — Z45.2 FITTING AND ADJUSTMENT OF VASCULAR CATHETER: ICD-10-CM

## 2019-02-07 LAB
ALBUMIN SERPL-MCNC: 4.5 G/DL (ref 3.5–5.2)
ALBUMIN/GLOB SERPL: 1.4 G/DL (ref 1.1–2.4)
ALP SERPL-CCNC: 93 U/L (ref 38–116)
ALT SERPL W P-5'-P-CCNC: 32 U/L (ref 0–33)
ANION GAP SERPL CALCULATED.3IONS-SCNC: 12 MMOL/L
AST SERPL-CCNC: 42 U/L (ref 0–32)
BASOPHILS # BLD AUTO: 0.06 10*3/MM3 (ref 0–0.1)
BASOPHILS NFR BLD AUTO: 1.2 % (ref 0–1.1)
BILIRUB SERPL-MCNC: 0.2 MG/DL (ref 0.1–1.2)
BUN BLD-MCNC: 23 MG/DL (ref 6–20)
BUN/CREAT SERPL: 27.4 (ref 7.3–30)
CALCIUM SPEC-SCNC: 9.6 MG/DL (ref 8.5–10.2)
CHLORIDE SERPL-SCNC: 105 MMOL/L (ref 98–107)
CO2 SERPL-SCNC: 24 MMOL/L (ref 22–29)
CREAT BLD-MCNC: 0.84 MG/DL (ref 0.6–1.1)
DEPRECATED RDW RBC AUTO: 45.1 FL (ref 37–49)
EOSINOPHIL # BLD AUTO: 0.1 10*3/MM3 (ref 0–0.36)
EOSINOPHIL NFR BLD AUTO: 2 % (ref 1–5)
ERYTHROCYTE [DISTWIDTH] IN BLOOD BY AUTOMATED COUNT: 14.3 % (ref 11.7–14.5)
GFR SERPL CREATININE-BSD FRML MDRD: 69 ML/MIN/1.73
GLOBULIN UR ELPH-MCNC: 3.2 GM/DL (ref 1.8–3.5)
GLUCOSE BLD-MCNC: 102 MG/DL (ref 74–124)
HCT VFR BLD AUTO: 32.5 % (ref 34–45)
HGB BLD-MCNC: 10 G/DL (ref 11.5–14.9)
IMM GRANULOCYTES # BLD AUTO: 0.01 10*3/MM3 (ref 0–0.03)
IMM GRANULOCYTES NFR BLD AUTO: 0.2 % (ref 0–0.5)
LYMPHOCYTES # BLD AUTO: 0.99 10*3/MM3 (ref 1–3.5)
LYMPHOCYTES NFR BLD AUTO: 19.9 % (ref 20–49)
MCH RBC QN AUTO: 26.7 PG (ref 27–33)
MCHC RBC AUTO-ENTMCNC: 30.8 G/DL (ref 32–35)
MCV RBC AUTO: 86.7 FL (ref 83–97)
MONOCYTES # BLD AUTO: 0.42 10*3/MM3 (ref 0.25–0.8)
MONOCYTES NFR BLD AUTO: 8.5 % (ref 4–12)
NEUTROPHILS # BLD AUTO: 3.39 10*3/MM3 (ref 1.5–7)
NEUTROPHILS NFR BLD AUTO: 68.2 % (ref 39–75)
NRBC BLD AUTO-RTO: 0 /100 WBC (ref 0–0)
PLATELET # BLD AUTO: 319 10*3/MM3 (ref 150–375)
PMV BLD AUTO: 8 FL (ref 8.9–12.1)
POTASSIUM BLD-SCNC: 4.4 MMOL/L (ref 3.5–4.7)
PROT SERPL-MCNC: 7.7 G/DL (ref 6.3–8)
RBC # BLD AUTO: 3.75 10*6/MM3 (ref 3.9–5)
SODIUM BLD-SCNC: 141 MMOL/L (ref 134–145)
WBC NRBC COR # BLD: 4.97 10*3/MM3 (ref 4–10)

## 2019-02-07 PROCEDURE — 96367 TX/PROPH/DG ADDL SEQ IV INF: CPT | Performed by: INTERNAL MEDICINE

## 2019-02-07 PROCEDURE — 96413 CHEMO IV INFUSION 1 HR: CPT | Performed by: INTERNAL MEDICINE

## 2019-02-07 PROCEDURE — 85025 COMPLETE CBC W/AUTO DIFF WBC: CPT

## 2019-02-07 PROCEDURE — 25010000002 DOXORUBICIN PER 10 MG: Performed by: INTERNAL MEDICINE

## 2019-02-07 PROCEDURE — 25010000003 DEXAMETHASONE SODIUM PHOSPHATE 100 MG/10ML SOLUTION: Performed by: INTERNAL MEDICINE

## 2019-02-07 PROCEDURE — 25010000002 FOSAPREPITANT PER 1 MG: Performed by: INTERNAL MEDICINE

## 2019-02-07 PROCEDURE — 25010000002 PALONOSETRON PER 25 MCG: Performed by: INTERNAL MEDICINE

## 2019-02-07 PROCEDURE — 25010000002 CYCLOPHOSPHAMIDE PER 100 MG: Performed by: INTERNAL MEDICINE

## 2019-02-07 PROCEDURE — 96411 CHEMO IV PUSH ADDL DRUG: CPT | Performed by: INTERNAL MEDICINE

## 2019-02-07 PROCEDURE — 96375 TX/PRO/DX INJ NEW DRUG ADDON: CPT | Performed by: INTERNAL MEDICINE

## 2019-02-07 PROCEDURE — 80053 COMPREHEN METABOLIC PANEL: CPT

## 2019-02-07 PROCEDURE — 25010000002 PEGFILGRASTIM 6 MG/0.6ML PREFILLED SYRINGE KIT: Performed by: INTERNAL MEDICINE

## 2019-02-07 RX ORDER — SODIUM CHLORIDE 9 MG/ML
250 INJECTION, SOLUTION INTRAVENOUS ONCE
Status: COMPLETED | OUTPATIENT
Start: 2019-02-07 | End: 2019-02-07

## 2019-02-07 RX ORDER — PALONOSETRON 0.05 MG/ML
0.25 INJECTION, SOLUTION INTRAVENOUS ONCE
Status: COMPLETED | OUTPATIENT
Start: 2019-02-07 | End: 2019-02-07

## 2019-02-07 RX ORDER — DOXORUBICIN HYDROCHLORIDE 2 MG/ML
60 INJECTION, SOLUTION INTRAVENOUS ONCE
Status: COMPLETED | OUTPATIENT
Start: 2019-02-07 | End: 2019-02-07

## 2019-02-07 RX ORDER — SODIUM CHLORIDE 0.9 % (FLUSH) 0.9 %
10 SYRINGE (ML) INJECTION AS NEEDED
Status: CANCELLED | OUTPATIENT
Start: 2019-02-07

## 2019-02-07 RX ADMIN — PALONOSETRON HYDROCHLORIDE 0.25 MG: 0.05 INJECTION, SOLUTION INTRAVENOUS at 13:20

## 2019-02-07 RX ADMIN — SODIUM CHLORIDE 250 ML: 900 INJECTION, SOLUTION INTRAVENOUS at 13:10

## 2019-02-07 RX ADMIN — PEGFILGRASTIM 6 MG: KIT SUBCUTANEOUS at 14:55

## 2019-02-07 RX ADMIN — Medication 500 UNITS: at 15:35

## 2019-02-07 RX ADMIN — DEXAMETHASONE SODIUM PHOSPHATE 12 MG: 10 INJECTION, SOLUTION INTRAMUSCULAR; INTRAVENOUS at 13:58

## 2019-02-07 RX ADMIN — SODIUM CHLORIDE 100 ML: 9 INJECTION, SOLUTION INTRAVENOUS at 13:21

## 2019-02-07 RX ADMIN — CYCLOPHOSPHAMIDE 1080 MG: 1 INJECTION, POWDER, FOR SOLUTION INTRAVENOUS; ORAL at 14:41

## 2019-02-07 RX ADMIN — DOXORUBICIN HYDROCHLORIDE 108 MG: 2 INJECTION, SOLUTION INTRAVENOUS at 14:32

## 2019-02-08 LAB
CYTO UR: NORMAL
LAB AP CASE REPORT: NORMAL
LAB AP CLINICAL INFORMATION: NORMAL
LAB AP DIAGNOSIS COMMENT: NORMAL
LAB AP SPECIAL STAINS: NORMAL
LAB AP SYNOPTIC CHECKLIST: NORMAL
Lab: NORMAL
PATH REPORT.ADDENDUM SPEC: NORMAL
PATH REPORT.FINAL DX SPEC: NORMAL
PATH REPORT.GROSS SPEC: NORMAL

## 2019-02-11 ENCOUNTER — TELEPHONE (OUTPATIENT)
Dept: OTHER | Facility: HOSPITAL | Age: 59
End: 2019-02-11

## 2019-02-11 NOTE — TELEPHONE ENCOUNTER
Marylu returned my call and I itroduced myself and navigational services. She stated she is doing well after her first chemo treatment. She is not experiencing any major symptoms from it at this time, but does report feeling a little more winded than usual while working with her horses. She is concerned about her hair loss that is about to begin. We discussed the design shop and the wig voucher to the [employer]. She said she already had a voucher and was planning on getting one soon.     We discussed integrative therapies and other services at the [facility] including the opportunity to speak with our Oncology Dietitian or Oncology Social Worker. She verbalized interest in receiving a navigation folder outlining services. I verified her mailing address and will send out a navigation folder with the following information:     Friend for Life Cancer Support Network,  Sharing Our Stories Breast Cancer Support Group, Cancer and Restorative Exercise (CARE), [facility]  Exercise program, Together for Breast Cancer Survival,  For Women Facing Breast Cancer,   [employer],  [facility], Massage Therapy, Reiki Therapy, [employer] [place], Cancer Nutrition, Survivorship Clinic.     She verbalized appreciation for navigational services and she has my contact information and will call with any questions that arise.

## 2019-02-11 NOTE — TELEPHONE ENCOUNTER
Referral received from Jeannine GUARDADO in mammography. Called Marylu and left a message introducing myself and navigational services. Asked her to call me back at her convenience and left my contact information.

## 2019-02-13 ENCOUNTER — LAB (OUTPATIENT)
Dept: LAB | Facility: HOSPITAL | Age: 59
End: 2019-02-13

## 2019-02-13 ENCOUNTER — OFFICE VISIT (OUTPATIENT)
Dept: ONCOLOGY | Facility: CLINIC | Age: 59
End: 2019-02-13

## 2019-02-13 VITALS
WEIGHT: 164.2 LBS | TEMPERATURE: 98.8 F | RESPIRATION RATE: 18 BRPM | HEART RATE: 86 BPM | SYSTOLIC BLOOD PRESSURE: 129 MMHG | HEIGHT: 64 IN | DIASTOLIC BLOOD PRESSURE: 80 MMHG | BODY MASS INDEX: 28.03 KG/M2 | OXYGEN SATURATION: 96 %

## 2019-02-13 DIAGNOSIS — C50.812 MALIGNANT NEOPLASM OF OVERLAPPING SITES OF BOTH BREASTS IN FEMALE, ESTROGEN RECEPTOR POSITIVE (HCC): ICD-10-CM

## 2019-02-13 DIAGNOSIS — Z17.0 MALIGNANT NEOPLASM OF OVERLAPPING SITES OF BOTH BREASTS IN FEMALE, ESTROGEN RECEPTOR POSITIVE (HCC): ICD-10-CM

## 2019-02-13 DIAGNOSIS — C50.812 MALIGNANT NEOPLASM OF OVERLAPPING SITES OF LEFT FEMALE BREAST, UNSPECIFIED ESTROGEN RECEPTOR STATUS (HCC): Primary | ICD-10-CM

## 2019-02-13 DIAGNOSIS — C50.811 MALIGNANT NEOPLASM OF OVERLAPPING SITES OF BOTH BREASTS IN FEMALE, ESTROGEN RECEPTOR POSITIVE (HCC): ICD-10-CM

## 2019-02-13 LAB
ALBUMIN SERPL-MCNC: 4.3 G/DL (ref 3.5–5.2)
ALBUMIN/GLOB SERPL: 1.4 G/DL (ref 1.1–2.4)
ALP SERPL-CCNC: 110 U/L (ref 38–116)
ALT SERPL W P-5'-P-CCNC: 26 U/L (ref 0–33)
ANION GAP SERPL CALCULATED.3IONS-SCNC: 11.6 MMOL/L
AST SERPL-CCNC: 27 U/L (ref 0–32)
BASOPHILS # BLD AUTO: 0.02 10*3/MM3 (ref 0–0.2)
BASOPHILS NFR BLD AUTO: 4.7 % (ref 0–1.5)
BILIRUB SERPL-MCNC: 0.4 MG/DL (ref 0.1–1.2)
BUN BLD-MCNC: 18 MG/DL (ref 6–20)
BUN/CREAT SERPL: 29 (ref 7.3–30)
CALCIUM SPEC-SCNC: 9.4 MG/DL (ref 8.5–10.2)
CHLORIDE SERPL-SCNC: 99 MMOL/L (ref 98–107)
CO2 SERPL-SCNC: 25.4 MMOL/L (ref 22–29)
CREAT BLD-MCNC: 0.62 MG/DL (ref 0.6–1.1)
DEPRECATED RDW RBC AUTO: 45.8 FL (ref 37–54)
EOSINOPHIL # BLD AUTO: 0.05 10*3/MM3 (ref 0–0.4)
EOSINOPHIL NFR BLD AUTO: 11.6 % (ref 0.3–6.2)
ERYTHROCYTE [DISTWIDTH] IN BLOOD BY AUTOMATED COUNT: 14.3 % (ref 12.3–15.4)
GFR SERPL CREATININE-BSD FRML MDRD: 98 ML/MIN/1.73
GLOBULIN UR ELPH-MCNC: 3 GM/DL (ref 1.8–3.5)
GLUCOSE BLD-MCNC: 136 MG/DL (ref 74–124)
HCT VFR BLD AUTO: 31.3 % (ref 34–46.6)
HGB BLD-MCNC: 9.6 G/DL (ref 12–15.9)
IMM GRANULOCYTES # BLD AUTO: 0.02 10*3/MM3 (ref 0–0.05)
IMM GRANULOCYTES NFR BLD AUTO: 4.7 % (ref 0–0.5)
LYMPHOCYTES # BLD AUTO: 0.28 10*3/MM3 (ref 0.7–3.1)
LYMPHOCYTES NFR BLD AUTO: 65.1 % (ref 19.6–45.3)
MCH RBC QN AUTO: 27 PG (ref 26.6–33)
MCHC RBC AUTO-ENTMCNC: 30.7 G/DL (ref 31.5–35.7)
MCV RBC AUTO: 87.9 FL (ref 79–97)
MONOCYTES # BLD AUTO: 0.02 10*3/MM3 (ref 0.1–0.9)
MONOCYTES NFR BLD AUTO: 4.7 % (ref 5–12)
NEUTROPHILS # BLD AUTO: 0.04 10*3/MM3 (ref 1.4–7)
NEUTROPHILS NFR BLD AUTO: 9.2 % (ref 42.7–76)
NRBC BLD AUTO-RTO: 0 /100 WBC (ref 0–0)
PLATELET # BLD AUTO: 250 10*3/MM3 (ref 140–450)
PMV BLD AUTO: 8.9 FL (ref 6–12)
POTASSIUM BLD-SCNC: 4.4 MMOL/L (ref 3.5–4.7)
PROT SERPL-MCNC: 7.3 G/DL (ref 6.3–8)
RBC # BLD AUTO: 3.56 10*6/MM3 (ref 3.77–5.28)
SODIUM BLD-SCNC: 136 MMOL/L (ref 134–145)
WBC NRBC COR # BLD: 0.43 10*3/MM3 (ref 3.4–10.8)

## 2019-02-13 PROCEDURE — 99213 OFFICE O/P EST LOW 20 MIN: CPT | Performed by: NURSE PRACTITIONER

## 2019-02-13 PROCEDURE — 36415 COLL VENOUS BLD VENIPUNCTURE: CPT | Performed by: INTERNAL MEDICINE

## 2019-02-13 PROCEDURE — 80053 COMPREHEN METABOLIC PANEL: CPT | Performed by: INTERNAL MEDICINE

## 2019-02-13 PROCEDURE — 85025 COMPLETE CBC W/AUTO DIFF WBC: CPT | Performed by: INTERNAL MEDICINE

## 2019-02-13 RX ORDER — LEVOFLOXACIN 500 MG/1
500 TABLET, FILM COATED ORAL DAILY
Qty: 7 TABLET | Refills: 0 | Status: SHIPPED | OUTPATIENT
Start: 2019-02-13 | End: 2019-02-20

## 2019-02-13 NOTE — PROGRESS NOTES
Subjective .     REASONS FOR FOLLOWUP:    1. 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 R BREAST MASS WITH GIANT R AXILLARY ADENOPATHY.  3. FAMILY HISTORY OF BREAST CANCER IN MOTHER AND SISTER, SISTER WAS TESTED FOR BRCA AND WAS NEGATIVE.       HISTORY OF PRESENT ILLNESS:  The patient is a 59 y.o. year old female who is here for follow-up with the above-mentioned history.    History of Present Illness   Ms. Georges returns today for follow-up, having last week initiated cycle 1 of Adriamycin/Cytoxan with Neulasta support.  She looks excellent today, stating that she has done well over the last week with no side effects to report.    We did review her blood work which unfortunately shows a significantly low WBC count of 0.4 and ANC of 0.04.  Thankfully she denies any fever or infectious symptoms.    She has already begun to notice necrosis of her ulcerated left breast mass.  She currently has this wrapped and dressed today and does not want me to take this off.  Bottom line, she is noting chemotherapy effect to her visible breast cancer already.  She is encouraged by this.  She denies any pain.    Appetite remains excellent.  She denies other concerns today.    Past Medical History:   Diagnosis Date   • Arthritis    • Breast cancer (CMS/HCC)     Left   • Hip pain     RIGHT HIP... CYST   • History of fracture of leg 1987   • Skin sore     OPEN SORE LEFT BREAST       ONCOLOGIC HISTORY:  (History from previous dates can be found in the separate document.)    Current Outpatient Medications on File Prior to Visit   Medication Sig Dispense Refill   • acetaminophen (TYLENOL) 500 MG tablet Take 500 mg by mouth Every 6 (Six) Hours As Needed for Mild Pain .     • coenzyme Q10 100 MG capsule Take 100 mg by mouth Daily.     • dexamethasone (DECADRON) 4 MG tablet Take 2 tablets in the morning daily on Days 2, 3 & 4.  Take with food. 6 tablet 3   • diclofenac sodium (VOTAREN XR) 100 MG 24 hr tablet  Take 100 mg by mouth Daily.     • ferrous sulfate 325 (65 FE) MG tablet Take 1 tablet by mouth Every Other Day. 15 tablet 1   • IBUPROFEN PO Take 1 tablet by mouth As Needed.     • Krill Oil 1000 MG capsule Take 1,000 mg by mouth Daily.     • ondansetron (ZOFRAN) 8 MG tablet Take 1 tablet by mouth 3 (Three) Times a Day As Needed for Nausea or Vomiting. 30 tablet 5   • oxyCODONE-acetaminophen (PERCOCET) 5-325 MG per tablet 1 q 6 hrs prn pain 20 tablet 0     No current facility-administered medications on file prior to visit.        ALLERGIES:     Allergies   Allergen Reactions   • Erythromycin GI Intolerance   • Penicillins GI Intolerance       Social History     Socioeconomic History   • Marital status:      Spouse name: Cruz   • Number of children: 0   • Years of education: Not on file   • Highest education level: Not on file   Social Needs   • Financial resource strain: Not hard at all   • Food insecurity - worry: Never true   • Food insecurity - inability: Never true   • Transportation needs - medical: No   • Transportation needs - non-medical: No   Occupational History   • Occupation:      Employer: SELF-EMPLOYED   Tobacco Use   • Smoking status: Never Smoker   • Smokeless tobacco: Never Used   Substance and Sexual Activity   • Alcohol use: Yes     Alcohol/week: 4.2 oz     Types: 4 Glasses of wine, 3 Cans of beer per week     Frequency: 2-3 times a week     Drinks per session: 1 or 2     Binge frequency: Never     Comment: OCCASIONALLY, NONE IN LAST TWO DAYS   • Drug use: No   • Sexual activity: Not Currently     Partners: Male     Birth control/protection: Post-menopausal   Other Topics Concern   • Not on file   Social History Narrative   • Not on file         Cancer-related family history includes Breast cancer in her maternal grandmother, maternal uncle, mother, and paternal grandmother; Breast cancer (age of onset: 48) in her sister; Cancer in her mother; Lung cancer in her father.  "    Review of Systems   Constitutional: Negative.  Negative for fatigue and fever.   HENT: Negative.    Eyes: Negative.    Respiratory: Negative.    Cardiovascular: Negative.    Gastrointestinal: Negative.  Negative for constipation and diarrhea.   Endocrine: Negative.    Genitourinary: Negative.    Musculoskeletal: Negative.    Skin: Negative.    Neurological: Negative.    Hematological: Negative.    Psychiatric/Behavioral: Negative.      A comprehensive 14 point review of systems was performed and was negative except as mentioned.    Objective      Vitals:    02/13/19 0952   BP: 129/80   Pulse: 86   Resp: 18   Temp: 98.8 °F (37.1 °C)   TempSrc: Oral   SpO2: 96%   Weight: 74.5 kg (164 lb 3.2 oz)   Height: 162.5 cm (63.98\")   PainSc: 0-No pain     Current Status 2/13/2019   ECOG score 0       Physical Exam   Constitutional: She is oriented to person, place, and time. She appears well-developed and well-nourished. No distress.   HENT:   Head: Normocephalic and atraumatic.   Mouth/Throat: No oropharyngeal exudate.   Eyes: Conjunctivae and EOM are normal. Pupils are equal, round, and reactive to light.   Neck: Normal range of motion.   Cardiovascular: Normal rate and regular rhythm.   No murmur heard.  Pulmonary/Chest: Effort normal and breath sounds normal. No respiratory distress. Right breast exhibits mass. Left breast exhibits mass.   Patient with chest wall/breasts bandaged. Requested dressing not be un-dressed today. Reports chest wall mass beginning to necrose.   Musculoskeletal: Normal range of motion. She exhibits no edema or deformity.   Neurological: She is alert and oriented to person, place, and time.   Skin: Skin is warm and dry. She is not diaphoretic. No erythema. No pallor.   Psychiatric: She has a normal mood and affect. Her behavior is normal. Judgment and thought content normal.         RECENT LABS:  Results from last 7 days   Lab Units 02/13/19  0931 02/07/19  1237   WBC 10*3/mm3 0.43* 4.97 "   NEUTROS ABS 10*3/mm3 0.04* 3.39   HEMOGLOBIN g/dL 9.6* 10.0*   HEMATOCRIT % 31.3* 32.5*   PLATELETS 10*3/mm3 250 319     Results from last 7 days   Lab Units 02/13/19  0931 02/07/19  1237   SODIUM mmol/L 136 141   POTASSIUM mmol/L 4.4 4.4   CHLORIDE mmol/L 99 105   CO2 mmol/L 25.4 24.0   BUN mg/dL 18 23*   CREATININE mg/dL 0.62 0.84   CALCIUM mg/dL 9.4 9.6   ALBUMIN g/dL 4.30 4.50   BILIRUBIN mg/dL 0.4 0.2   ALK PHOS U/L 110 93   ALT (SGPT) U/L 26 32   AST (SGOT) U/L 27 42*   GLUCOSE mg/dL 136* 102           Assessment/Plan    1. 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 R BREAST MASS WITH GIANT R AXILLARY ADENOPATHY.  3. FAMILY HISTORY OF BREAST CANCER IN MOTHER AND SISTER, SISTER WAS TESTED FOR BRCA AND WAS NEGATIVE.    The patient initiated cycle 1 of Adriamycin/Cytoxan 1 week ago with Neulasta support.  Despite this she is neutropenic today with an ANC of 0.04.  Thankfully she is absolutely asymptomatic.  She looks excellent and feels very well.  She has been educated on neutropenic precautions, specifically to call with fever.  She will be started on Levaquin 500 mg daily for the next 7 days.  She will see a nurse in follow-up in 1 week for repeat CBC.  She will see Dr. Dixon in 2 weeks for cycle 2 of AC.                Cc:  Joby Barron Jr., MD

## 2019-02-14 ENCOUNTER — TELEPHONE (OUTPATIENT)
Dept: ONCOLOGY | Facility: HOSPITAL | Age: 59
End: 2019-02-14

## 2019-02-14 NOTE — TELEPHONE ENCOUNTER
----- Message from Juanita Gomez sent at 2/14/2019  8:44 AM EST -----  [phone]   took a new antibiotic and it has made her sick.

## 2019-02-14 NOTE — TELEPHONE ENCOUNTER
Pt says she started levaquin last night and within 10 minutes of taking it, she had nausea and vomited.  She has not had any nausea/vomiting since starting chemo and she is feeling fine today.  I s/w ISAIAS Santiago (Elizabeth not in the office today; she saw pt yesterday and started her on the levaquin).  ISAIAS Field instructed pt to take zofran 30 mintues before taking the levaquin today and see if she can tolerate it then.    Pt v/u and she acknowledges she still has zofran to take.  She will call us if N/V happens again after levaquin today.

## 2019-02-21 ENCOUNTER — LAB (OUTPATIENT)
Dept: LAB | Facility: HOSPITAL | Age: 59
End: 2019-02-21

## 2019-02-21 ENCOUNTER — CLINICAL SUPPORT (OUTPATIENT)
Dept: ONCOLOGY | Facility: HOSPITAL | Age: 59
End: 2019-02-21

## 2019-02-21 VITALS
SYSTOLIC BLOOD PRESSURE: 109 MMHG | OXYGEN SATURATION: 97 % | TEMPERATURE: 98.7 F | DIASTOLIC BLOOD PRESSURE: 75 MMHG | HEART RATE: 79 BPM

## 2019-02-21 DIAGNOSIS — C50.812 MALIGNANT NEOPLASM OF OVERLAPPING SITES OF BOTH BREASTS IN FEMALE, ESTROGEN RECEPTOR POSITIVE (HCC): ICD-10-CM

## 2019-02-21 DIAGNOSIS — C50.811 MALIGNANT NEOPLASM OF OVERLAPPING SITES OF BOTH BREASTS IN FEMALE, ESTROGEN RECEPTOR POSITIVE (HCC): ICD-10-CM

## 2019-02-21 DIAGNOSIS — Z17.0 MALIGNANT NEOPLASM OF OVERLAPPING SITES OF BOTH BREASTS IN FEMALE, ESTROGEN RECEPTOR POSITIVE (HCC): ICD-10-CM

## 2019-02-21 LAB
ALBUMIN SERPL-MCNC: 4.1 G/DL (ref 3.5–5.2)
ALBUMIN/GLOB SERPL: 1.2 G/DL (ref 1.1–2.4)
ALP SERPL-CCNC: 148 U/L (ref 38–116)
ALT SERPL W P-5'-P-CCNC: 69 U/L (ref 0–33)
ANION GAP SERPL CALCULATED.3IONS-SCNC: 13.3 MMOL/L
AST SERPL-CCNC: 42 U/L (ref 0–32)
BASOPHILS # BLD AUTO: 0.05 10*3/MM3 (ref 0–0.2)
BASOPHILS NFR BLD AUTO: 0.5 % (ref 0–1.5)
BILIRUB SERPL-MCNC: <0.2 MG/DL (ref 0.1–1.2)
BUN BLD-MCNC: 20 MG/DL (ref 6–20)
BUN/CREAT SERPL: 24.7 (ref 7.3–30)
CALCIUM SPEC-SCNC: 9.6 MG/DL (ref 8.5–10.2)
CHLORIDE SERPL-SCNC: 101 MMOL/L (ref 98–107)
CO2 SERPL-SCNC: 23.7 MMOL/L (ref 22–29)
CREAT BLD-MCNC: 0.81 MG/DL (ref 0.6–1.1)
DEPRECATED RDW RBC AUTO: 47.3 FL (ref 37–54)
EOSINOPHIL # BLD AUTO: 0.02 10*3/MM3 (ref 0–0.4)
EOSINOPHIL NFR BLD AUTO: 0.2 % (ref 0.3–6.2)
ERYTHROCYTE [DISTWIDTH] IN BLOOD BY AUTOMATED COUNT: 14.8 % (ref 12.3–15.4)
GFR SERPL CREATININE-BSD FRML MDRD: 72 ML/MIN/1.73
GLOBULIN UR ELPH-MCNC: 3.3 GM/DL (ref 1.8–3.5)
GLUCOSE BLD-MCNC: 94 MG/DL (ref 74–124)
HCT VFR BLD AUTO: 32.9 % (ref 34–46.6)
HGB BLD-MCNC: 9.9 G/DL (ref 12–15.9)
IMM GRANULOCYTES # BLD AUTO: 0.1 10*3/MM3 (ref 0–0.05)
IMM GRANULOCYTES NFR BLD AUTO: 1 % (ref 0–0.5)
LYMPHOCYTES # BLD AUTO: 0.92 10*3/MM3 (ref 0.7–3.1)
LYMPHOCYTES NFR BLD AUTO: 9.3 % (ref 19.6–45.3)
MCH RBC QN AUTO: 26.2 PG (ref 26.6–33)
MCHC RBC AUTO-ENTMCNC: 30.1 G/DL (ref 31.5–35.7)
MCV RBC AUTO: 87 FL (ref 79–97)
MONOCYTES # BLD AUTO: 0.72 10*3/MM3 (ref 0.1–0.9)
MONOCYTES NFR BLD AUTO: 7.3 % (ref 5–12)
NEUTROPHILS # BLD AUTO: 8.09 10*3/MM3 (ref 1.4–7)
NEUTROPHILS NFR BLD AUTO: 81.7 % (ref 42.7–76)
NRBC BLD AUTO-RTO: 0 /100 WBC (ref 0–0)
PLATELET # BLD AUTO: 292 10*3/MM3 (ref 140–450)
PMV BLD AUTO: 8.9 FL (ref 6–12)
POTASSIUM BLD-SCNC: 4 MMOL/L (ref 3.5–4.7)
PROT SERPL-MCNC: 7.4 G/DL (ref 6.3–8)
RBC # BLD AUTO: 3.78 10*6/MM3 (ref 3.77–5.28)
SODIUM BLD-SCNC: 138 MMOL/L (ref 134–145)
WBC NRBC COR # BLD: 9.9 10*3/MM3 (ref 3.4–10.8)

## 2019-02-21 PROCEDURE — 85025 COMPLETE CBC W/AUTO DIFF WBC: CPT | Performed by: INTERNAL MEDICINE

## 2019-02-21 PROCEDURE — 36415 COLL VENOUS BLD VENIPUNCTURE: CPT | Performed by: INTERNAL MEDICINE

## 2019-02-21 PROCEDURE — 80053 COMPREHEN METABOLIC PANEL: CPT | Performed by: INTERNAL MEDICINE

## 2019-02-21 RX ORDER — CEPHALEXIN 500 MG/1
500 CAPSULE ORAL 3 TIMES DAILY
Qty: 21 CAPSULE | Refills: 0 | Status: SHIPPED | OUTPATIENT
Start: 2019-02-21 | End: 2019-02-28 | Stop reason: SDUPTHER

## 2019-02-21 NOTE — PATIENT INSTRUCTIONS
Infection Control in the Home  If you have an infection or you are taking care of someone who has an infection, it is important to know how to keep the infection from spreading. Follow these guidelines to help stop the spread of infection, and talk to your health care provider.  How are infections spread?  In order for an infection to spread, the following must be present:  · A germ. This may be a virus, bacteria, fungus, or parasite.  · A place for the germ to live. This may be:  ? On or in a person, animal, plant, or food.  ? In soil or water.  ? On surfaces, such as a door handle.  · A susceptible host. This is a person or animal who does not have resistance (immunity) to the germ.  · A way for the germ to enter the host. This may occur by:  ? Direct contact. This may happen by making contact--such as shaking hands or hugging--with an infected person or animal. Some germs can also travel through the air and spread to you if an infected person coughs or sneezes on you or near you.  ? Indirect contact. This is when the germ enters the host through contact with an infected object. Examples include eating contaminated food, drinking contaminated water, or touching a contaminated surface with your hands and then touching your face, nose, or mouth soon after that.    How can I help to prevent infection from spreading?  There are several things that you can do to help prevent infection from spreading.  Hand Washing    It is very important to wash your hands correctly, following these steps:  1. Wet your hands with clean, running water.  2. Apply soap to your hands. Liquid soap is better than bar soap.  3. Rub your hands together quickly to create lather.  4. Keep rubbing your hands together for at least 20 seconds. Thoroughly scrub all parts of your hands, including under your fingernails and between your fingers.  5. Rinse your hands with clean, running water until all of the soap is gone.  6. Dry your hands with an  air dryer or a clean paper or cloth towel, or let your hands air-dry. Do not use your clothing or a soiled towel to dry your hands.  7. If you are in a public restroom, use your towel to turn off the water faucet and to open the bathroom door.    Make sure to wash your hands:  · Before:  ? Visiting a baby or anyone with a weakened or lowered defense (immune) system.  ? Putting in and taking out any contact lenses.  · After:  ? Working or playing outside.  ? Touching an animal or its toys or leash.  ? Handling livestock.  ? Using the bathroom or helping a child or adult to use the bathroom.  ? Using household  or toxic chemicals.  ? Touching or taking out the garbage.  ? Touching anything dirty around your home.  ? Handling soiled clothes or rags.  ? Taking care of a sick child. This includes touching used tissues, toys, and clothes.  ? Sneezing, coughing, or blowing your nose.  ? Using public transportation.  ? Shaking hands.  ? Using a phone, including your mobile phone.  ? Touching money.  · Before and after:  ? Preparing food.  ? Preparing a bottle for a baby.  ? Feeding a baby or a young child.  ? Eating.  ? Visiting or taking care of someone who is sick.  ? Changing a diaper.  ? Changing a bandage (dressing) or taking care of an injury or wound.  ? Giving or taking medicine.    Taking Care of Your Home  · Make sure that you have enough cleaning supplies at all times. These include:  ? Disinfectants.  ? Reusable cleaning cloths. Wash these after each use.  ? Paper towels.  ? Utility gloves. Replace your gloves if they are cracked or torn or if they start to peel.  · Use bleach safely. Never mix it with other cleaning products, especially those that contain ammonia. This mixture can create a dangerous gas that may be deadly.  · Take care of your cleaning supplies. Toilet brushes, mops, and sponges can breed germs. Soak them in bleach and water for 5 minutes after each use.  · Do not pour used mop water  down the sink. Pour it down the toilet instead.  · Maintain proper ventilation in your home.  · If you have a pet, ensure that your pet stays clean. Do not let people with weak immune systems touch bird droppings, fish tank water, or a litter box.  ? If you have a cat, be sure to change the litter every day.  · In the bathroom, make sure you:  ? Provide liquid soap.  ? Change towels and washcloths frequently. Avoid sharing towels and washcloths.  ? Change toothbrushes often and store them separately in a clean, dry place.  ? Disinfect the toilet.  ? Clean the tub, shower, and sink with standard cleaning products.  ? Mop the floor with a standard .  ? Do not share personal items, such as razors, toothbrushes, drinking glasses, deodorant, ram, brushes, towels, and washcloths.  · In the kitchen, make sure you:  ? Store food carefully.  ? Refrigerate leftovers promptly in covered containers.  ? Throw out stale or spoiled food.  ? Clean the inside of your refrigerator each week.  ? Keep your refrigerator set at 40°F (4°C) or less, and set your freezer at 0°F (-18°C) or less.  ? Thaw foods in the refrigerator or microwave, not at room temperature.  ? Serve foods at the proper temperature. Do not eat raw meat. Make sure it is cooked to the appropriate temperature. Cook eggs until they are firm.  ? Wash fruits and vegetables under running water.  ? Use separate cutting boards, plates, and utensils for raw foods and cooked foods.  ? Keep work surfaces clean.  ? Use a clean spoon each time you sample food while cooking.  ? Wash your dishes in hot, soapy water. Air-dry your dishes or use a .  ? Do not share forks, cups, or spoons during meals.  · Wear gloves if laundry is visibly soiled.  · Change linens each week or whenever they are soiled.  · Do not shake soiled linens. Doing that may send germs into the air. Put dressings, sanitary or incontinence pads, diapers, and gloves in plastic garbage bags for  disposal.  This information is not intended to replace advice given to you by your health care provider. Make sure you discuss any questions you have with your health care provider.  Document Released: 09/26/2009 Document Revised: 05/19/2017 Document Reviewed: 08/20/2015  [employer] Patient Education © 2018 [employer]  Cyclophosphamide injection  What is this medicine?  CYCLOPHOSPHAMIDE (sye kloe LITA fa mide) is a chemotherapy drug. It slows the growth of cancer cells. This medicine is used to treat many types of cancer like lymphoma, myeloma, leukemia, breast cancer, and ovarian cancer, to name a few.  This medicine may be used for other purposes; ask your health care provider or pharmacist if you have questions.  COMMON BRAND NAME(S): Cytoxan, Neosar  What should I tell my health care provider before I take this medicine?  They need to know if you have any of these conditions:  -blood disorders  -history of other chemotherapy  -infection  -kidney disease  -liver disease  -recent or ongoing radiation therapy  -tumors in the bone marrow  -an unusual or allergic reaction to cyclophosphamide, other chemotherapy, other medicines, foods, dyes, or preservatives  -pregnant or trying to get pregnant  -breast-feeding  How should I use this medicine?  This drug is usually given as an injection into a vein or muscle or by infusion into a vein. It is administered in a hospital or clinic by a specially trained health care professional.  Talk to your pediatrician regarding the use of this medicine in children. Special care may be needed.  Overdosage: If you think you have taken too much of this medicine contact a poison control center or emergency room at once.  NOTE: This medicine is only for you. Do not share this medicine with others.  What if I miss a dose?  It is important not to miss your dose. Call your doctor or health care professional if you are unable to keep an appointment.  What may interact with this  medicine?  This medicine may interact with the following medications:  -amiodarone  -amphotericin B  -azathioprine  -certain antiviral medicines for HIV or AIDS such as protease inhibitors (e.g., indinavir, ritonavir) and zidovudine  -certain blood pressure medications such as benazepril, captopril, enalapril, fosinopril, lisinopril, moexipril, monopril, perindopril, quinapril, ramipril, trandolapril  -certain cancer medications such as anthracyclines (e.g., daunorubicin, doxorubicin), busulfan, cytarabine, paclitaxel, pentostatin, tamoxifen, trastuzumab  -certain diuretics such as chlorothiazide, chlorthalidone, hydrochlorothiazide, indapamide, metolazone  -certain medicines that treat or prevent blood clots like warfarin  -certain muscle relaxants such as succinylcholine  -cyclosporine  -etanercept  -indomethacin  -medicines to increase blood counts like filgrastim, pegfilgrastim, sargramostim  -medicines used as general anesthesia  -metronidazole  -natalizumab  This list may not describe all possible interactions. Give your health care provider a list of all the medicines, herbs, non-prescription drugs, or dietary supplements you use. Also tell them if you smoke, drink alcohol, or use illegal drugs. Some items may interact with your medicine.  What should I watch for while using this medicine?  Visit your doctor for checks on your progress. This drug may make you feel generally unwell. This is not uncommon, as chemotherapy can affect healthy cells as well as cancer cells. Report any side effects. Continue your course of treatment even though you feel ill unless your doctor tells you to stop.  Drink water or other fluids as directed. Urinate often, even at night.  In some cases, you may be given additional medicines to help with side effects. Follow all directions for their use.  Call your doctor or health care professional for advice if you get a fever, chills or sore throat, or other symptoms of a cold or flu.  Do not treat yourself. This drug decreases your body's ability to fight infections. Try to avoid being around people who are sick.  This medicine may increase your risk to bruise or bleed. Call your doctor or health care professional if you notice any unusual bleeding.  Be careful brushing and flossing your teeth or using a toothpick because you may get an infection or bleed more easily. If you have any dental work done, tell your dentist you are receiving this medicine.  You may get drowsy or dizzy. Do not drive, use machinery, or do anything that needs mental alertness until you know how this medicine affects you.  Do not become pregnant while taking this medicine or for 1 year after stopping it. Women should inform their doctor if they wish to become pregnant or think they might be pregnant. Men should not father a child while taking this medicine and for 4 months after stopping it. There is a potential for serious side effects to an unborn child. Talk to your health care professional or pharmacist for more information. Do not breast-feed an infant while taking this medicine.  This medicine may interfere with the ability to have a child. This medicine has caused ovarian failure in some women. This medicine has caused reduced sperm counts in some men. You should talk with your doctor or health care professional if you are concerned about your fertility.  If you are going to have surgery, tell your doctor or health care professional that you have taken this medicine.  What side effects may I notice from receiving this medicine?  Side effects that you should report to your doctor or health care professional as soon as possible:  -allergic reactions like skin rash, itching or hives, swelling of the face, lips, or tongue  -low blood counts - this medicine may decrease the number of white blood cells, red blood cells and platelets. You may be at increased risk for infections and bleeding.  -signs of infection - fever  or chills, cough, sore throat, pain or difficulty passing urine  -signs of decreased platelets or bleeding - bruising, pinpoint red spots on the skin, black, tarry stools, blood in the urine  -signs of decreased red blood cells - unusually weak or tired, fainting spells, lightheadedness  -breathing problems  -dark urine  -dizziness  -palpitations  -swelling of the ankles, feet, hands  -trouble passing urine or change in the amount of urine  -weight gain  -yellowing of the eyes or skin  Side effects that usually do not require medical attention (report to your doctor or health care professional if they continue or are bothersome):  -changes in nail or skin color  -hair loss  -missed menstrual periods  -mouth sores  -nausea, vomiting  This list may not describe all possible side effects. Call your doctor for medical advice about side effects. You may report side effects to FDA at 4-104-FDA-2570.  Where should I keep my medicine?  This drug is given in a hospital or clinic and will not be stored at home.  NOTE: This sheet is a summary. It may not cover all possible information. If you have questions about this medicine, talk to your doctor, pharmacist, or health care provider.  © 2018 [employer] (2013-11-01 16:22:58)  Doxorubicin injection  What is this medicine?  DOXORUBICIN (dox oh JOHN bi sin) is a chemotherapy drug. It is used to treat many kinds of cancer like leukemia, lymphoma, neuroblastoma, sarcoma, and Wilms' tumor. It is also used to treat bladder cancer, breast cancer, lung cancer, ovarian cancer, stomach cancer, and thyroid cancer.  This medicine may be used for other purposes; ask your health care provider or pharmacist if you have questions.  COMMON BRAND NAME(S): Adriamycin, Adriamycin PFS, Adriamycin RDF, Rubex  What should I tell my health care provider before I take this medicine?  They need to know if you have any of these conditions:  -heart disease  -history of low blood counts caused  by a medicine  -liver disease  -recent or ongoing radiation therapy  -an unusual or allergic reaction to doxorubicin, other chemotherapy agents, other medicines, foods, dyes, or preservatives  -pregnant or trying to get pregnant  -breast-feeding  How should I use this medicine?  This drug is given as an infusion into a vein. It is administered in a hospital or clinic by a specially trained health care professional. If you have pain, swelling, burning or any unusual feeling around the site of your injection, tell your health care professional right away.  Talk to your pediatrician regarding the use of this medicine in children. Special care may be needed.  Overdosage: If you think you have taken too much of this medicine contact a poison control center or emergency room at once.  NOTE: This medicine is only for you. Do not share this medicine with others.  What if I miss a dose?  It is important not to miss your dose. Call your doctor or health care professional if you are unable to keep an appointment.  What may interact with this medicine?  This medicine may interact with the following medications:  -6-mercaptopurine  -paclitaxel  -phenytoin  -[place]'s Wort  -trastuzumab  -verapamil  This list may not describe all possible interactions. Give your health care provider a list of all the medicines, herbs, non-prescription drugs, or dietary supplements you use. Also tell them if you smoke, drink alcohol, or use illegal drugs. Some items may interact with your medicine.  What should I watch for while using this medicine?  This drug may make you feel generally unwell. This is not uncommon, as chemotherapy can affect healthy cells as well as cancer cells. Report any side effects. Continue your course of treatment even though you feel ill unless your doctor tells you to stop.  There is a maximum amount of this medicine you should receive throughout your life. The amount depends on the medical condition being treated and  your overall health. Your doctor will watch how much of this medicine you receive in your lifetime. Tell your doctor if you have taken this medicine before.  You may need blood work done while you are taking this medicine.  Your urine may turn red for a few days after your dose. This is not blood. If your urine is dark or brown, call your doctor.  In some cases, you may be given additional medicines to help with side effects. Follow all directions for their use.  Call your doctor or health care professional for advice if you get a fever, chills or sore throat, or other symptoms of a cold or flu. Do not treat yourself. This drug decreases your body's ability to fight infections. Try to avoid being around people who are sick.  This medicine may increase your risk to bruise or bleed. Call your doctor or health care professional if you notice any unusual bleeding.  Talk to your doctor about your risk of cancer. You may be more at risk for certain types of cancers if you take this medicine.  Do not become pregnant while taking this medicine or for 6 months after stopping it. Women should inform their doctor if they wish to become pregnant or think they might be pregnant. Men should not father a child while taking this medicine and for 6 months after stopping it. There is a potential for serious side effects to an unborn child. Talk to your health care professional or pharmacist for more information. Do not breast-feed an infant while taking this medicine.  This medicine has caused ovarian failure in some women and reduced sperm counts in some men This medicine may interfere with the ability to have a child. Talk with your doctor or health care professional if you are concerned about your fertility.  What side effects may I notice from receiving this medicine?  Side effects that you should report to your doctor or health care professional as soon as possible:  -allergic reactions like skin rash, itching or hives, swelling  of the face, lips, or tongue  -breathing problems  -chest pain  -fast or irregular heartbeat  -low blood counts - this medicine may decrease the number of white blood cells, red blood cells and platelets. You may be at increased risk for infections and bleeding.  -pain, redness, or irritation at site where injected  -signs of infection - fever or chills, cough, sore throat, pain or difficulty passing urine  -signs of decreased platelets or bleeding - bruising, pinpoint red spots on the skin, black, tarry stools, blood in the urine  -swelling of the ankles, feet, hands  -tiredness  -weakness  Side effects that usually do not require medical attention (report to your doctor or health care professional if they continue or are bothersome):  -diarrhea  -hair loss  -mouth sores  -nail discoloration or damage  -nausea  -red colored urine  -vomiting  This list may not describe all possible side effects. Call your doctor for medical advice about side effects. You may report side effects to FDA at 5-058-FDA-1023.  Where should I keep my medicine?  This drug is given in a hospital or clinic and will not be stored at home.  NOTE: This sheet is a summary. It may not cover all possible information. If you have questions about this medicine, talk to your doctor, pharmacist, or health care provider.  © 2018 [employer] Standard (2017-02-13 11:28:51)  Cephalexin oral suspension  What is this medicine?  CEPHALEXIN (sef a KARI in) is a cephalosporin antibiotic. It is used to treat certain kinds of bacterial infections.It will not work for colds, flu, or other viral infections.  This medicine may be used for other purposes; ask your health care provider or pharmacist if you have questions.  COMMON BRAND NAME(S): Biocef, Keflex, Panixine  What should I tell my health care provider before I take this medicine?  They need to know if you have any of these conditions:  -kidney disease  -stomach or intestine problems, especially  colitis  -an unusual or allergic reaction to cephalexin, other cephalosporins, penicillins, other antibiotics, medicines, foods, dyes or preservatives  -pregnant or trying to get pregnant  -breast-feeding  How should I use this medicine?  Take this medicine by mouth. Follow the directions on your prescription label. Shake well before using. Use a specially marked spoon or container to measure your medicine. Ask your pharmacist if you do not have one. Household spoons are not accurate. You can take this medicine with food or on an empty stomach. If the medicine upsets your stomach, take it with food. Do not take your medicine more often than directed. Finish the full course prescribed by your doctor or health care professional even if you think your condition is better.  Talk to your pediatrician regarding the use of this medicine in children. While this drug may be prescribed for selected conditions, precautions do apply.  Overdosage: If you think you have taken too much of this medicine contact a poison control center or emergency room at once.  NOTE: This medicine is only for you. Do not share this medicine with others.  What if I miss a dose?  If you miss a dose, take it as soon as you can. If it is almost time for your next dose, take only that dose. Do not take double or extra doses. There should be at least 4 to 6 hours between doses.  What may interact with this medicine?  -probenecid  -some other antibiotics  This list may not describe all possible interactions. Give your health care provider a list of all the medicines, herbs, non-prescription drugs, or dietary supplements you use. Also tell them if you smoke, drink alcohol, or use illegal drugs. Some items may interact with your medicine.  What should I watch for while using this medicine?  Tell your doctor or health care professional if your symptoms do not begin to improve in a few days.  Do not treat diarrhea with over the counter products. Contact your  doctor if you have diarrhea that lasts more than 2 days or if it is severe and watery.  If you have diabetes, you may get a false-positive result for sugar in your urine. Check with your doctor or health care professional.  What side effects may I notice from receiving this medicine?  Side effects that you should report to your doctor or health care professional as soon as possible:  -allergic reactions like skin rash, itching or hives, swelling of the face, lips, or tongue  -breathing problems  -pain or difficulty passing urine  -redness, blistering, peeling or loosening of the skin, including inside the mouth  -severe or watery diarrhea  -unusually weak or tired  -yellowing of the eyes, skin  Side effects that usually do not require medical attention (report to your doctor or health care professional if they continue or are bothersome):  -gas or heartburn  -genital or anal irritation  -headache  -joint or muscle pain  -nausea, vomiting  This list may not describe all possible side effects. Call your doctor for medical advice about side effects. You may report side effects to FDA at 4-360-FDA-8291.  Where should I keep my medicine?  Keep out of the reach of children.  After this medicine is mixed by your pharmacist, store it in the refrigerator. Do not freeze. Throw away any unused medicine after 14 days.  NOTE: This sheet is a summary. It may not cover all possible information. If you have questions about this medicine, talk to your doctor, pharmacist, or health care provider.  © 2018 [employer] Standard (2009-03-23 17:10:55)

## 2019-02-21 NOTE — PROGRESS NOTES
Pt is here for lab with RN review.  CBC reviewed with pt, counts are stable for this pt at this time and VSS. She has finished her Levaquin prescription. Pt would like to take Voltaren again. Per Elizabeth Herring NP pt can take Voltaren every other day.  Pt has left breat ulceration that has become how red, hot and swollen over the past 3 days. Per Elizabeth Herring NP pt is to take Keflex TID for 7 days.Script sent to pt's pharmacy. Reviewed S/S of infection and when to seek medical attention.  Copy of labs and AVS given to pt and f/u appt reviewed. Pt is instructed to call with concerns prior to next visit. Pt V/U.   Lab Results   Component Value Date    WBC 9.90 02/21/2019    HGB 9.9 (L) 02/21/2019    HCT 32.9 (L) 02/21/2019    MCV 87.0 02/21/2019     02/21/2019

## 2019-02-28 ENCOUNTER — INFUSION (OUTPATIENT)
Dept: ONCOLOGY | Facility: HOSPITAL | Age: 59
End: 2019-02-28

## 2019-02-28 ENCOUNTER — OFFICE VISIT (OUTPATIENT)
Dept: ONCOLOGY | Facility: CLINIC | Age: 59
End: 2019-02-28

## 2019-02-28 VITALS
BODY MASS INDEX: 27.23 KG/M2 | WEIGHT: 159.5 LBS | HEIGHT: 64 IN | DIASTOLIC BLOOD PRESSURE: 78 MMHG | RESPIRATION RATE: 14 BRPM | HEART RATE: 73 BPM | SYSTOLIC BLOOD PRESSURE: 127 MMHG | OXYGEN SATURATION: 97 % | TEMPERATURE: 97.9 F

## 2019-02-28 DIAGNOSIS — D63.0 ANEMIA IN NEOPLASTIC DISEASE: ICD-10-CM

## 2019-02-28 DIAGNOSIS — C50.812 MALIGNANT NEOPLASM OF OVERLAPPING SITES OF BOTH BREASTS IN FEMALE, ESTROGEN RECEPTOR POSITIVE (HCC): ICD-10-CM

## 2019-02-28 DIAGNOSIS — Z80.3 FAMILY HISTORY OF BREAST CANCER IN FEMALE: ICD-10-CM

## 2019-02-28 DIAGNOSIS — Z17.0 MALIGNANT NEOPLASM OF OVERLAPPING SITES OF BOTH BREASTS IN FEMALE, ESTROGEN RECEPTOR POSITIVE (HCC): ICD-10-CM

## 2019-02-28 DIAGNOSIS — C50.812 MALIGNANT NEOPLASM OF OVERLAPPING SITES OF LEFT FEMALE BREAST, UNSPECIFIED ESTROGEN RECEPTOR STATUS (HCC): Primary | ICD-10-CM

## 2019-02-28 DIAGNOSIS — C50.811 MALIGNANT NEOPLASM OF OVERLAPPING SITES OF BOTH BREASTS IN FEMALE, ESTROGEN RECEPTOR POSITIVE (HCC): ICD-10-CM

## 2019-02-28 DIAGNOSIS — Z17.0 MALIGNANT NEOPLASM OF OVERLAPPING SITES OF LEFT BREAST IN FEMALE, ESTROGEN RECEPTOR POSITIVE (HCC): ICD-10-CM

## 2019-02-28 DIAGNOSIS — Z45.2 FITTING AND ADJUSTMENT OF VASCULAR CATHETER: ICD-10-CM

## 2019-02-28 DIAGNOSIS — C50.812 MALIGNANT NEOPLASM OF OVERLAPPING SITES OF LEFT BREAST IN FEMALE, ESTROGEN RECEPTOR POSITIVE (HCC): ICD-10-CM

## 2019-02-28 DIAGNOSIS — R22.31 AXILLARY MASS, RIGHT: ICD-10-CM

## 2019-02-28 LAB
ALBUMIN SERPL-MCNC: 4.1 G/DL (ref 3.5–5.2)
ALBUMIN/GLOB SERPL: 1.4 G/DL (ref 1.1–2.4)
ALP SERPL-CCNC: 100 U/L (ref 38–116)
ALT SERPL W P-5'-P-CCNC: 31 U/L (ref 0–33)
ANION GAP SERPL CALCULATED.3IONS-SCNC: 12.8 MMOL/L
AST SERPL-CCNC: 33 U/L (ref 0–32)
BASOPHILS # BLD AUTO: 0.08 10*3/MM3 (ref 0–0.2)
BASOPHILS NFR BLD AUTO: 1.4 % (ref 0–1.5)
BILIRUB SERPL-MCNC: <0.2 MG/DL (ref 0.1–1.2)
BUN BLD-MCNC: 21 MG/DL (ref 6–20)
BUN/CREAT SERPL: 26.3 (ref 7.3–30)
CALCIUM SPEC-SCNC: 9.6 MG/DL (ref 8.5–10.2)
CHLORIDE SERPL-SCNC: 104 MMOL/L (ref 98–107)
CO2 SERPL-SCNC: 25.2 MMOL/L (ref 22–29)
CREAT BLD-MCNC: 0.8 MG/DL (ref 0.6–1.1)
DEPRECATED RDW RBC AUTO: 48.8 FL (ref 37–54)
EOSINOPHIL # BLD AUTO: 0.01 10*3/MM3 (ref 0–0.4)
EOSINOPHIL NFR BLD AUTO: 0.2 % (ref 0.3–6.2)
ERYTHROCYTE [DISTWIDTH] IN BLOOD BY AUTOMATED COUNT: 15.7 % (ref 12.3–15.4)
GFR SERPL CREATININE-BSD FRML MDRD: 73 ML/MIN/1.73
GLOBULIN UR ELPH-MCNC: 3 GM/DL (ref 1.8–3.5)
GLUCOSE BLD-MCNC: 105 MG/DL (ref 74–124)
HCT VFR BLD AUTO: 32 % (ref 34–46.6)
HGB BLD-MCNC: 9.7 G/DL (ref 12–15.9)
IMM GRANULOCYTES # BLD AUTO: 0.02 10*3/MM3 (ref 0–0.05)
IMM GRANULOCYTES NFR BLD AUTO: 0.3 % (ref 0–0.5)
LYMPHOCYTES # BLD AUTO: 0.83 10*3/MM3 (ref 0.7–3.1)
LYMPHOCYTES NFR BLD AUTO: 14.5 % (ref 19.6–45.3)
MCH RBC QN AUTO: 26.4 PG (ref 26.6–33)
MCHC RBC AUTO-ENTMCNC: 30.3 G/DL (ref 31.5–35.7)
MCV RBC AUTO: 87.2 FL (ref 79–97)
MONOCYTES # BLD AUTO: 0.52 10*3/MM3 (ref 0.1–0.9)
MONOCYTES NFR BLD AUTO: 9.1 % (ref 5–12)
NEUTROPHILS # BLD AUTO: 4.28 10*3/MM3 (ref 1.4–7)
NEUTROPHILS NFR BLD AUTO: 74.5 % (ref 42.7–76)
NRBC BLD AUTO-RTO: 0 /100 WBC (ref 0–0)
PLATELET # BLD AUTO: 527 10*3/MM3 (ref 140–450)
PMV BLD AUTO: 8.3 FL (ref 6–12)
POTASSIUM BLD-SCNC: 4.6 MMOL/L (ref 3.5–4.7)
PROT SERPL-MCNC: 7.1 G/DL (ref 6.3–8)
RBC # BLD AUTO: 3.67 10*6/MM3 (ref 3.77–5.28)
SODIUM BLD-SCNC: 142 MMOL/L (ref 134–145)
WBC NRBC COR # BLD: 5.74 10*3/MM3 (ref 3.4–10.8)

## 2019-02-28 PROCEDURE — 96377 APPLICATON ON-BODY INJECTOR: CPT | Performed by: INTERNAL MEDICINE

## 2019-02-28 PROCEDURE — 25010000002 PEGFILGRASTIM 6 MG/0.6ML PREFILLED SYRINGE KIT: Performed by: INTERNAL MEDICINE

## 2019-02-28 PROCEDURE — 25010000002 PALONOSETRON PER 25 MCG: Performed by: INTERNAL MEDICINE

## 2019-02-28 PROCEDURE — 96413 CHEMO IV INFUSION 1 HR: CPT | Performed by: INTERNAL MEDICINE

## 2019-02-28 PROCEDURE — 99215 OFFICE O/P EST HI 40 MIN: CPT | Performed by: INTERNAL MEDICINE

## 2019-02-28 PROCEDURE — 96367 TX/PROPH/DG ADDL SEQ IV INF: CPT | Performed by: INTERNAL MEDICINE

## 2019-02-28 PROCEDURE — 96411 CHEMO IV PUSH ADDL DRUG: CPT | Performed by: INTERNAL MEDICINE

## 2019-02-28 PROCEDURE — 85025 COMPLETE CBC W/AUTO DIFF WBC: CPT

## 2019-02-28 PROCEDURE — 25010000002 DOXORUBICIN PER 10 MG: Performed by: INTERNAL MEDICINE

## 2019-02-28 PROCEDURE — 96375 TX/PRO/DX INJ NEW DRUG ADDON: CPT | Performed by: INTERNAL MEDICINE

## 2019-02-28 PROCEDURE — 25010000002 CYCLOPHOSPHAMIDE PER 100 MG: Performed by: INTERNAL MEDICINE

## 2019-02-28 PROCEDURE — 25010000003 DEXAMETHASONE SODIUM PHOSPHATE 100 MG/10ML SOLUTION: Performed by: INTERNAL MEDICINE

## 2019-02-28 PROCEDURE — 80053 COMPREHEN METABOLIC PANEL: CPT

## 2019-02-28 PROCEDURE — 25010000002 FOSAPREPITANT PER 1 MG: Performed by: INTERNAL MEDICINE

## 2019-02-28 RX ORDER — DOXORUBICIN HYDROCHLORIDE 2 MG/ML
60 INJECTION, SOLUTION INTRAVENOUS ONCE
Status: COMPLETED | OUTPATIENT
Start: 2019-02-28 | End: 2019-02-28

## 2019-02-28 RX ORDER — CEPHALEXIN 500 MG/1
500 CAPSULE ORAL 3 TIMES DAILY
Qty: 21 CAPSULE | Refills: 3 | Status: SHIPPED | OUTPATIENT
Start: 2019-02-28 | End: 2019-06-28

## 2019-02-28 RX ORDER — PALONOSETRON 0.05 MG/ML
0.25 INJECTION, SOLUTION INTRAVENOUS ONCE
Status: COMPLETED | OUTPATIENT
Start: 2019-02-28 | End: 2019-02-28

## 2019-02-28 RX ORDER — SODIUM CHLORIDE 9 MG/ML
250 INJECTION, SOLUTION INTRAVENOUS ONCE
Status: CANCELLED | OUTPATIENT
Start: 2019-02-28

## 2019-02-28 RX ORDER — PALONOSETRON 0.05 MG/ML
0.25 INJECTION, SOLUTION INTRAVENOUS ONCE
Status: CANCELLED | OUTPATIENT
Start: 2019-02-28

## 2019-02-28 RX ORDER — DOXORUBICIN HYDROCHLORIDE 2 MG/ML
60 INJECTION, SOLUTION INTRAVENOUS ONCE
Status: CANCELLED | OUTPATIENT
Start: 2019-02-28

## 2019-02-28 RX ORDER — SODIUM CHLORIDE 9 MG/ML
250 INJECTION, SOLUTION INTRAVENOUS ONCE
Status: COMPLETED | OUTPATIENT
Start: 2019-02-28 | End: 2019-02-28

## 2019-02-28 RX ADMIN — DOXORUBICIN HYDROCHLORIDE 108 MG: 2 INJECTION, SOLUTION INTRAVENOUS at 13:11

## 2019-02-28 RX ADMIN — SODIUM CHLORIDE 100 ML: 9 INJECTION, SOLUTION INTRAVENOUS at 12:02

## 2019-02-28 RX ADMIN — PEGFILGRASTIM 6 MG: KIT SUBCUTANEOUS at 14:05

## 2019-02-28 RX ADMIN — DEXAMETHASONE SODIUM PHOSPHATE 12 MG: 10 INJECTION, SOLUTION INTRAMUSCULAR; INTRAVENOUS at 12:36

## 2019-02-28 RX ADMIN — CYCLOPHOSPHAMIDE 1080 MG: 1 INJECTION, POWDER, FOR SOLUTION INTRAVENOUS; ORAL at 13:30

## 2019-02-28 RX ADMIN — PALONOSETRON HYDROCHLORIDE 0.25 MG: 0.05 INJECTION, SOLUTION INTRAVENOUS at 12:02

## 2019-02-28 RX ADMIN — SODIUM CHLORIDE 250 ML: 9 INJECTION, SOLUTION INTRAVENOUS at 12:02

## 2019-02-28 NOTE — PROGRESS NOTES
Subjective     REASON FOR FOLLOW UP:  1. 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 R BREAST MASS WITH GIANT R AXILLARY ADENOPATHY.  3. FAMILY HISTORY OF BREAST CANCER IN MOTHER AND SISTER, SISTER WAS TESTED FOR BRCA AND WAS NEGATIVE.                                       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 She typically goes to multiple places throughout the [place]. In any event, she has been referred to me because the patient has had a mass in the left breast at least for 3 or 4 years that has been slowly growing until became giant and ulcerated and has been having bleeding at least for the last 2 or 3 months. The patient is having pain, especially in the periphery of the tumor. The breast has become so big that she has difficulty dealing with this anymore. She also has noticed that in the right axilla she has developed a huge mass that is not painful that has been present at least and growing steadily for the last 3 or 4 months. She has not had any palpable abnormalities in the right breast but she is suspicious in an area external to the nipple. The patient denies any skeletal pain. Her appetite has been excellent and her weight is stable. She has not had any cough, sputum production or shortness of breath. Her bowel function and urination are normal. She has not had any vaginal bleeding or discharge. She has not had any other skin lesions. She denies any neurological symptomatology.      The patient returned to the office on 02/28/2019 to proceed with the 2nd cycle of AC. During the 1st cycle the patient had a bad taste for 4 days, constipation for 2-3 days and no pain with the Neulasta. She developed cellulitis in the left breast and she required therapy with Keflex with resolution. The patient on the other hand has  seen a dramatic reduction in the size of her huge mass in the right axilla and dramatic decrease in the size of her left breast. She has had lesser degree of bleeding to the point that the area that is eroded and crusted has dried up and she has no further pain. The patient's appetite has improved, her taste has returned back to normality, her bowel activity has resumed and urination is normal. She has energy and she needs to take a nap through the day. He had neutropenia as expected though very short duration. She is developing further anemia induced by her treatment and I would not be surprised if she ends up requiring transfusion of red cells at some point.           Past Medical History:   Diagnosis Date   • Arthritis    • Breast cancer (CMS/HCC)     Left   • Hip pain     RIGHT HIP... CYST   • History of fracture of leg 1987   • Skin sore     OPEN SORE LEFT BREAST           Past Surgical History:   Procedure Laterality Date   • FRACTURE SURGERY  1987    Leg   • HARDWARE REMOVAL     • VENOUS ACCESS DEVICE (PORT) INSERTION Right 2/1/2019    Procedure: INSERTION VENOUS ACCESS DEVICE;  Surgeon: Joby Barron Jr., MD;  Location: [facility] OR [facility];  Service: General        Current Outpatient Medications on File Prior to Visit   Medication Sig Dispense Refill   • acetaminophen (TYLENOL) 500 MG tablet Take 500 mg by mouth Every 6 (Six) Hours As Needed for Mild Pain .     • dexamethasone (DECADRON) 4 MG tablet Take 2 tablets in the morning daily on Days 2, 3 & 4.  Take with food. 6 tablet 3   • diclofenac sodium (VOTAREN XR) 100 MG 24 hr tablet Take 100 mg by mouth Daily.     • IBUPROFEN PO Take 1 tablet by mouth As Needed.     • ondansetron (ZOFRAN) 8 MG tablet Take 1 tablet by mouth 3 (Three) Times a Day As Needed for Nausea or Vomiting. 30 tablet 5   • oxyCODONE-acetaminophen (PERCOCET) 5-325 MG per tablet 1 q 6 hrs prn pain 20 tablet 0   • coenzyme Q10 100 MG capsule Take 100 mg by mouth Daily.     • ferrous sulfate  325 (65 FE) MG tablet Take 1 tablet by mouth Every Other Day. 15 tablet 1   • Krill Oil 1000 MG capsule Take 1,000 mg by mouth Daily.     • [DISCONTINUED] cephalexin (KEFLEX) 500 MG capsule Take 1 capsule by mouth 3 (Three) Times a Day. 21 capsule 0     No current facility-administered medications on file prior to visit.         ALLERGIES:    Allergies   Allergen Reactions   • Erythromycin GI Intolerance   • Penicillins GI Intolerance        Social History     Socioeconomic History   • Marital status:      Spouse name: Cruz   • Number of children: 0   • Years of education: Not on file   • Highest education level: Not on file   Social Needs   • Financial resource strain: Not hard at all   • Food insecurity - worry: Never true   • Food insecurity - inability: Never true   • Transportation needs - medical: No   • Transportation needs - non-medical: No   Occupational History   • Occupation:      Employer: SELF-EMPLOYED   Tobacco Use   • Smoking status: Never Smoker   • Smokeless tobacco: Never Used   Substance and Sexual Activity   • Alcohol use: Yes     Alcohol/week: 4.2 oz     Types: 4 Glasses of wine, 3 Cans of beer per week     Frequency: 2-3 times a week     Drinks per session: 1 or 2     Binge frequency: Never     Comment: OCCASIONALLY, NONE IN LAST TWO DAYS   • Drug use: No   • Sexual activity: Not Currently     Partners: Male     Birth control/protection: Post-menopausal        Family History   Problem Relation Age of Onset   • Lung cancer Father    • Cancer Mother    • Breast cancer Mother    • Liver disease Mother    • Breast cancer Sister 48   • Breast cancer Maternal Grandmother    • Breast cancer Paternal Grandmother    • Breast cancer Maternal Uncle    • Malig Hyperthermia Neg Hx         Review of Systems             General: no fever, no chills, stated fatigue,no weight changes, no lack of appetite.  Eyes: no epiphora, xerophthalmia,conjunctivitis, pain, glaucoma, blurred vision,  "blindness, secretion, photophobia, proptosis, diplopia.  Ears: no otorrhea, tinnitus, otorrhagia, deafness, pain, vertigo.  Nose: no rhinorrhea, no epistaxis, no alteration in perception of odors, no sinuses pressure.  Mouth: no alteration in gums or teeth,  No ulcers, no difficulty with mastication or deglut ion, no odynophagia.  Neck: no masses or pain, no thyroid alterations, no pain in muscles or arteries, no carotid odynia, no crepitation.  Respiratory: no cough, no sputum production,no dyspnea,no trepopnea, no pleuritic pain,no hemoptysis.  Heart: no syncope, no irregularity, no palpitations, no angina,no orthopnea,no paroxysmal nocturnal dyspnea.  Vascular Venous: no tenderness,no edema,no palpable cords,no postphlebitic syndrome, no skin changes no ulcerations.  Vascular Arterial: no distal ischemia, noclaudication, no gangrene, no neuropathic ischemic pain, no skin ulcers, no paleness no cyanosis.  GI: no dysphagia, no odynophagia, no regurgitation, no heartburn,no indigestion,no nausea,no vomiting,no hematemesis ,no melena,no jaundice,no distention, no obstipation,no enterorrhagia,no proctalgia,no anal  lesions, no changes in bowel habits.  : no frequency, no hesitancy, no hematuria, no discharge,no  pain.  Musculoskeletal: no muscle or tendon pain or inflammation,no  joint pain, no edema, no functional limitation,no fasciculations, no mass.  Neurologic: no headache, no seizures, noalterations on Craneal nerves, no motor deficit, no sensory deficit, normal coordination, no alteration in memory,normal orientation, calculation,normal writting, verbal and written language.  Skin: no rashes,no pruritus no localized lesions.  Psychiatric: no anxiety, no depression,no agitation, no delusions, proper insight.    Objective     Vitals:    02/28/19 1050   BP: 127/78   Pulse: 73   Resp: 14   Temp: 97.9 °F (36.6 °C)   TempSrc: Oral   SpO2: 97%   Weight: 72.3 kg (159 lb 8 oz)   Height: 163 cm (64.17\")   PainSc: 0-No " pain     Current Status 2/28/2019   ECOG score 0       Physical Exam        GENERAL:  Well-developed, well-nourished  Patient  in no acute distress.   SKIN:  Warm, dry ,NO rashes,NO purpura ,NO petechiae.  HEENT:  Pupils were equal and reactive to light and accomodation, conjunctivas non injected, no pterigion, normal extraocular movements, normal visual acuity.   Mouth mucosa was moist, no exudates in oropharynx, normal gum line, normal roof of the mouth and pillars, normal papillations of the tongue.  NECK:  Supple with good range of motion; no thyromegaly or masses, no JVD or bruits, no cervical adenopathies.No carotid arteries pain, no carotid abnormal pulsation , NO arterial dance.  LYMPHATICS:  No cervical, NO supraclavicular, NO axillary,NO epitrochlear , NO inguinal adenopathy.  CHEST:  Normal excursion of both luz thoraces, normal voice fremitus, no subcutaneous emphysema, normal axillas, no rashes or acanthosis nigricans. Lungs clear to percussion and auscultation, normal breath sounds bilaterally, no wheezing,NO crackles NO ronchi, NO stridor, NO rubs.  CARDIAC AND VASCULAR:  normal rate and regular rhythm, without murmurs,NO rubs NO S3 NO S4 right or left . Normal femoral, popliteal, pedis, brachial and carotid pulses.  ABDOMEN:  Soft, nontender with no organomegaly or masses, no ascites, no collateral circulation,no distention,no Vernon sign, no abdominal pain, no inguinal hernias,no umbilical hernia, no abdominal bruits. Normal bowel sounds.  GENITAL: Not  Performed.  EXTREMITIES  AND SPINE:  No clubbing, cyanosis or edema, no deformities or pain .No kyphosis, scoliosis, deformities or pain in spine, ribs or pelvic bone.  NEUROLOGICAL:  Patient was awake, alert, oriented to time, person and place.  Her right breast examination discloses no masses whatsoever or induration like I felt before. Most dramatically the huge axillary adenopathy on the right side has dropped in size by half, is now 4 x 5 cm.  Now this has become mobile, nontender with no fluctuation. The left breast has decreased in volume by 50%. The area of ulceration and necrosis in the skin is dried up and there is more the limitation of the huge mass that replaces the left breast. The left axilla remains unchanged.  There was no lymphedema in either extremity.                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 At least for the last 4 years, she has had an in crescendo mass in the left breast that has produced a gigantic tumor that is now close to 25 cm in size and is replacing most of the anatomy of the left breast. There are areas of ulceration necrosis and bleeding and the mass is fixed to the chest wall. She has abundant amount of collateral circulation in the left anterior chest wall and it caught my attention the lack of any left axillary adenopathy. She has no lymphedema in the left upper extremity. In the right breast while in sitting position, no palpable abnormalities are documented. The right breast skin and nipple are normal. When the patient lies flat, there is a palpable mass at 9 o'clock from the nipple that measures probably 4 cm in size, very indistinct in regard to edges and margins. There was no mobility and no areas of tenderness. She has a giant adenopathy in the right axilla that measures close to 9 cm in  size, uniform, no fluctuation, nontender, completely fixed to the axillary wall. There is no compromise of the skin.     Since the previous visit the patient has undergone the 1st cycle of AC with Neulasta support developing some alteration in her taste for a few days, constipation for a few days and no pain in the bones related to the Neulasta. She has had some fatigue and she has a little bit more anemia. She had transitory neutropenia that was very short-lived. She developed cellulitis in the left breast and she had to take Keflex with resolution of the symptoms.     Today she has recovered her white count and her platelet count. Her hemoglobin is a little bit lower 9.7. She has no clinical bleeding, no hemolysis. It is dramatic to see the reduction in the size of the masses in the right breast, the mass in the right axilla and the huge mass in the left breast. This is in view of sensitivity testing and to me is very dramatic. I would not be surprised if by the end of these 4 cycles of AC the patient will have close to 80% reduction in her bulk of tumor.    I discussed with her and her brother the fact of the need for continuation of medicines in the same dosing, Neulasta recycled in the same dosing and continue blood work on a weekly basis. I pointed out to her that she does not need to get surprised if we need to give her blood transfusion at some point especially if the hemoglobin goes below 8.5.    She will have repeat CBC and chemistry profile in 3 weeks.     Her port is fully functional.     She has not developed any lymphedema.     I gave her a new prescription for Keflex 500 mg tablets #21 with 3 refills to have a fresh batch of this medicine at home in case that she has another episode of cellulitis in the left breast. She will take this medicine as soon as she starts to see changes that signify infection. She is smart to understand this and she understands clearly the initiation of medicine and the  timing.    I discussed all of these facts with her and her brother.

## 2019-02-28 NOTE — PROGRESS NOTES
Subjective     REASON FOR FOLLOW UP:  1. 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 R BREAST MASS WITH GIANT R AXILLARY ADENOPATHY.  3. FAMILY HISTORY OF BREAST CANCER IN MOTHER AND SISTER, SISTER WAS TESTED FOR BRCA AND WAS NEGATIVE.                                       History of Present Illness         Past Medical History:   Diagnosis Date   • Arthritis    • Breast cancer (CMS/HCC)     Left   • Hip pain     RIGHT HIP... CYST   • History of fracture of leg 1987   • Skin sore     OPEN SORE LEFT BREAST           Past Surgical History:   Procedure Laterality Date   • FRACTURE SURGERY  1987    Leg   • HARDWARE REMOVAL     • VENOUS ACCESS DEVICE (PORT) INSERTION Right 2/1/2019    Procedure: INSERTION VENOUS ACCESS DEVICE;  Surgeon: Joby Barron Jr., MD;  [place] [facility] [place] [facility] [place]: [place]        Current Outpatient Medications on File Prior to Visit   Medication Sig Dispense Refill   • acetaminophen (TYLENOL) 500 MG tablet Take 500 mg by mouth Every 6 (Six) Hours As Needed for Mild Pain .     • dexamethasone (DECADRON) 4 MG tablet Take 2 tablets in the morning daily on Days 2, 3 & 4.  Take with food. 6 tablet 3   • diclofenac sodium (VOTAREN XR) 100 MG 24 hr tablet Take 100 mg by mouth Daily.     • IBUPROFEN PO Take 1 tablet by mouth As Needed.     • ondansetron (ZOFRAN) 8 MG tablet Take 1 tablet by mouth 3 (Three) Times a Day As Needed for Nausea or Vomiting. 30 tablet 5   • oxyCODONE-acetaminophen (PERCOCET) 5-325 MG per tablet 1 q 6 hrs prn pain 20 tablet 0   • cephalexin (KEFLEX) 500 MG capsule Take 1 capsule by mouth 3 (Three) Times a Day. 21 capsule 0   • coenzyme Q10 100 MG capsule Take 100 mg by mouth Daily.     • ferrous sulfate 325 (65 FE) MG tablet Take 1 tablet by mouth Every Other Day. 15 tablet 1   • Krill Oil 1000 MG capsule Take 1,000 mg by mouth Daily.       No current facility-administered medications on file prior to visit.         ALLERGIES:    Allergies    Allergen Reactions   • Erythromycin GI Intolerance   • Penicillins GI Intolerance        Social History     Socioeconomic History   • Marital status:      Spouse name: Cruz   • Number of children: 0   • Years of education: Not on file   • Highest education level: Not on file   Social Needs   • Financial resource strain: Not hard at all   • Food insecurity - worry: Never true   • Food insecurity - inability: Never true   • Transportation needs - medical: No   • Transportation needs - non-medical: No   Occupational History   • Occupation:      Employer: SELF-EMPLOYED   Tobacco Use   • Smoking status: Never Smoker   • Smokeless tobacco: Never Used   Substance and Sexual Activity   • Alcohol use: Yes     Alcohol/week: 4.2 oz     Types: 4 Glasses of wine, 3 Cans of beer per week     Frequency: 2-3 times a week     Drinks per session: 1 or 2     Binge frequency: Never     Comment: OCCASIONALLY, NONE IN LAST TWO DAYS   • Drug use: No   • Sexual activity: Not Currently     Partners: Male     Birth control/protection: Post-menopausal        Family History   Problem Relation Age of Onset   • Lung cancer Father    • Cancer Mother    • Breast cancer Mother    • Liver disease Mother    • Breast cancer Sister 48   • Breast cancer Maternal Grandmother    • Breast cancer Paternal Grandmother    • Breast cancer Maternal Uncle    • Malig Hyperthermia Neg Hx         Review of Systems           General: no fever, no chills, no fatigue,no weight changes, no lack of appetite.  Eyes: no epiphora, xerophthalmia,conjunctivitis, pain, glaucoma, blurred vision, blindness, secretion, photophobia, proptosis, diplopia.  Ears: no otorrhea, tinnitus, otorrhagia, deafness, pain, vertigo.  Nose: no rhinorrhea, no epistaxis, no alteration in perception of odors, no sinuses pressure.  Mouth: no alteration in gums or teeth,  No ulcers, no difficulty with mastication or deglut ion, no odynophagia.  Neck: no masses or pain, no  "thyroid alterations, no pain in muscles or arteries, no carotid odynia, no crepitation.  Respiratory: no cough, no sputum production,no dyspnea,no trepopnea, no pleuritic pain,no hemoptysis.  Heart: no syncope, no irregularity, no palpitations, no angina,no orthopnea,no paroxysmal nocturnal dyspnea.  Vascular Venous: no tenderness,no edema,no palpable cords,no postphlebitic syndrome, no skin changes no ulcerations.  Vascular Arterial: no distal ischemia, noclaudication, no gangrene, no neuropathic ischemic pain, no skin ulcers, no paleness no cyanosis.  GI: no dysphagia, no odynophagia, no regurgitation, no heartburn,no indigestion,no nausea,no vomiting,no hematemesis ,no melena,no jaundice,no distention, no obstipation,no enterorrhagia,no proctalgia,no anal  lesions, no changes in bowel habits.  : no frequency, no hesitancy, no hematuria, no discharge,no  pain.  Musculoskeletal: no muscle or tendon pain or inflammation,no  joint pain, no edema, no functional limitation,no fasciculations, no mass.  Neurologic: no headache, no seizures, noalterations on Craneal nerves, no motor deficit, no sensory deficit, normal coordination, no alteration in memory,normal orientation, calculation,normal writting, verbal and written language.  Skin: no rashes,no pruritus no localized lesions.  Psychiatric: no anxiety, no depression,no agitation, no delusions, proper insight.        Objective     Vitals:    02/28/19 1050   BP: 127/78   Pulse: 73   Resp: 14   Temp: 97.9 °F (36.6 °C)   TempSrc: Oral   SpO2: 97%   Weight: 72.3 kg (159 lb 8 oz)   Height: 163 cm (64.17\")   PainSc: 0-No pain     Current Status 2/28/2019   ECOG score 0       Physical Exam                  RECENT LABS:  Hematology WBC   Date Value Ref Range Status   02/28/2019 5.74 3.40 - 10.80 10*3/mm3 Final     RBC   Date Value Ref Range Status   02/28/2019 3.67 (L) 3.77 - 5.28 10*6/mm3 Final     Hemoglobin   Date Value Ref Range Status   02/28/2019 9.7 (L) 12.0 - 15.9 " g/dL Final     Hematocrit   Date Value Ref Range Status   02/28/2019 32.0 (L) 34.0 - 46.6 % Final     Platelets   Date Value Ref Range Status   02/28/2019 527 (H) 140 - 450 10*3/mm3 Final      CT CHEST W CONTRAST-, CT ABDOMEN PELVIS W CONTRAST-     CLINICAL HISTORY: Newly diagnosed extensive left breast carcinoma.  Initial staging. Evaluate for metastatic disease.     TECHNIQUE: Spiral CT images were obtained through the chest abdomen and  pelvis with oral and IV contrast and were reconstructed in 3 mm thick  slices.     Radiation dose reduction techniques were utilized, including automated  exposure control and exposure modulation based on body size.     COMPARISON: None     FINDINGS: There is a very large lobulated mass within the upper aspect  of the left breast that measures approximately 12.3 x 4.0 x 8.8 cm in  diameter. Posteriorly, the tumor abuts and likely infiltrates the chest  wall musculature. It extends into the skin anteriorly. There is diffuse  skin thickening within the left breast inferior to the mass consistent  with lymphovascular infiltration. Multiple additional small enhancing  tumor nodules are also evident within the left breast inferior to the  dominant mass consistent with metastatic disease. There are also  multiple small enhancing solid nodules scattered throughout the right  breast suspicious for metastatic disease or additional primary breast  neoplasms. These measure up to 1.7 cm in diameter. Further evaluation  with mammography and breast ultrasound suggested. There are multiple  significantly enlarged bilateral axillary lymph nodes consistent with  metastatic disease. The largest lymph node on the left measures 4.0 cm  in diameter. The largest lymph node on the right measures up to 5.2 cm  in diameter. No definite internal mammary chain lymphadenopathy is  identified. There is no mediastinal or hilar lymphadenopathy. Lung  window images demonstrate a solitary punctate noncalcified  nodule in the  lateral aspect of the left lower lobe measuring 2 to 3 mm that is  statistically quite likely benign and are otherwise unremarkable. There  are no pleural effusions. There is a tiny cyst in the lateral segment of  the left lobe of the liver. The liver is otherwise unremarkable. The  spleen and pancreas and kidneys and adrenal glands appear within normal  limits. The stomach and small and large bowel are unremarkable. There is  a 5.5 x 4.1 cm diameter unilocular cyst in the left adnexa that is most  likely within the ovary. Further evaluation with a pelvic ultrasound  suggested. Bone window images demonstrate no lytic or blastic lesions.     IMPRESSION:  There is a large lobulated solid mass in the left breast  infiltrating the skin. Additional small tumor nodules are evident in the  inferior aspect of the left breast. There are also small masses in the  right breast that could be neoplastic in etiology. There is fairly  extensive bilateral axillary lymphadenopathy consistent with metastatic  disease. There is no compelling evidence of metastatic disease elsewhere  within the chest or within the abdomen and pelvis. A single very tiny  punctate noncalcified pulmonary nodule is noted in the left lower lobe.  A unilocular cystic mass in the left adnexa likely arises from the  ovary. A pelvic ultrasound may be helpful for further evaluation.     This report was finalized on 1/24/2019 11:09 AM by Dr. Sridhar Bee M.D.     MRI BRAIN WITH AND WITHOUT CONTRAST      HISTORY: Breast cancer, evaluate for metastatic disease.     COMPARISON: None.     TECHNIQUE: A MRI examination of the brain was performed before and after  the intravenous administration of contrast utilizing sagittal T1, axial  diffusion, T1, T2, T2 FLAIR and sagittal, axial and coronal T1  postcontrast weighted sequences.     FINDINGS: There is no evidence of restricted diffusion to suggest acute  infarction. The brain and ventricles are  symmetrical. The axial T2  sequence demonstrates expected flow-void in the basilar artery and in  the distal aspect of the internal carotid arteries bilaterally. The left  vertebral artery is dominant. The basilar artery is somewhat diminutive  in caliber as there appear to be fetal origins of the posterior cerebral  arteries bilaterally. There is no evidence of mass, mass effect or of  abnormal enhancement to suggest metastatic disease.     IMPRESSION:  No evidence of metastatic disease. Relatively isolated  posterior circulation supplied by the left vertebral artery.     This report was finalized on 1/25/2019 5:37 PM by Dr. Luis M Kaur M.D.  NUCLEAR MEDICINE WHOLE BODY BONE SCAN     HISTORY: Newly diagnosed breast cancer. Evaluate for metastatic disease.     TECHNIQUE:  23.4 mCi of 99m technetium MDP was administered IV followed  by 3 hour delayed phase whole body imaging with selected spot views.     COMPARISON: CT chest, abdomen and pelvis 01/24/2019.     FINDINGS:  There is increased uptake within both acetabula. This is  greater on the right. Review of CT demonstrates subchondral cyst  formation in the acetabular roofs bilaterally. There is also sclerosis  within the right acetabular roof and this may be degenerative as no  additional abnormal foci of uptake are present to support that this  represents metastatic disease. There is degenerative uptake at both  patellofemoral joints and within both first MTP joints within the left  midfoot and both shoulders and sternoclavicular joints. Mild  dextroscoliotic curvature is present in the lumbar spine where there is  degenerative uptake. Both kidneys and the urinary bladder are  visualized. There is soft tissue uptake overlying the left breast /  anterior chest wall consistent with a breast mass.     IMPRESSION:  1. No convincing scintigraphic evidence for bony metastatic disease.  There is increased acetabular uptake, greater on the right. Sclerosis  and  subchondral cysts are present in the right acetabular roof on CT and  this suspected to be arthritic though follow-up bone scan recommended.  2. Increased soft tissue uptake anterior left chest / left breast at  site of the breast mass.     This report was finalized on 1/24/2019 2:26 PM by Dr. Andrae Rivas M.D.     Contains abnormal data Cancer Antigen 15-3   Order: [id number]   Status:  Final result   Visible to patient:  Yes (MyChart)   Dx:  Malignant neoplasm of overlapping sit...    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 At least for the last 4 years, she has had an in crescendo mass in the left breast that has produced a gigantic tumor that is now close to 25 cm in size and is replacing most of the anatomy of the left breast. There are areas of ulceration necrosis and bleeding and the mass is fixed to the chest wall. She has abundant amount of collateral circulation in the left anterior chest wall and it caught my attention the lack of any left axillary adenopathy. She has no lymphedema in the left upper extremity. In the right breast while in sitting position, no palpable abnormalities are documented. The right breast skin and nipple are normal. When the patient lies flat, there is a palpable mass at 9 o'clock from the nipple that measures probably 4 cm in size, very indistinct in regard to edges and margins. There was no mobility and no areas of tenderness. She has a giant adenopathy in the right axilla that measures close to 9 cm in size, uniform, no fluctuation, nontender, completely fixed to the axillary wall. There is no compromise of the skin.     There is no  supraclavicular or infraclavicular adenopathies. The rest of her physical examination is normal.     The degree of comorbidities in this patient are minimal with the exception of minimal osteoarthritis in the fingers of her hands. She typically does not take any medicines and obviously, she has neglected herself at least for the last 20 years in regard to medical care.     The patient also has a component of anemia that is very likely anemia of neoplastic disease.     Obviously, this patient has probably bilateral breast cancers. She has a right axillary adenopathy. She has dramatic compromise of the left breast and she has ulceration necrosis and oozing from this site. She has pain but she is tough enough that she does not want to take any pain medicine.       The patient also has a strong family history of breast cancer in her mother who developed breast cancer when she was 40; her sister developed breast cancer when she was pregnant. The patient had a BRCA analysis that was negative. Nevertheless, the family history extends furthermore into more family members with breast cancer.     I have reviewed personally in the [facility] the MRI scan of the brain, CT scan of the chest, abdomen and pelvis and bone scan.  All these tests show no evidence brain metastases, no evidence of bone metastases and no evidence of pulmonary or liver metastases.  The tumoral lesions in the left breast are huge and she has a large pectoralis lymph node that is very evident radiologically.  She has nodularity in the right breast suggestive of multifocal breast cancer and large right axillary adenopathy.    She underwent biopsy of her breast yesterday and obviously pathology is not available yet.    The patient has a CA 15-3 that is elevated at 44 and not surprising for the amount of bulky tumor she has in the left breast.     So far we are very dinora we have not encountered brain, bone or liver metastases in  this patient or pulmonary metastases.  On the other hand, she has very bulky disease.  Again, this has been neglected for many years.     On top of that the patient has a low ferritin and low iron, her diet is appropriate in regard to proteins with turkey meat and so forth and I do believe that this is probably related to iron deficiency triggered by significant bleeding in her breast and I have no other excuse.      Obviously, we are waiting to review the pathology of the biopsy of the breast for further information.     The patient’s case was presented in the multidisciplinary breast cancer conference last week and basically the principle is that she needs to be treated aggressively for her malignancy with chemotherapy.  My plan will be to deliver A/C x4, each treatment every 3 weeks and using Neulasta Onpro.  Also, thereafter she will proceed with Taxol weekly for 12 weeks of treatment.  She will be rechecked on clinical grounds at each one of the visits to see how the breast and axillary nodes are doing on the right side and eventually we will check her radiologically as well.     Once we get the final pathology we will make further plans for future adjuvant hormonal therapy.  The patient realizes that eventually after completion of her chemotherapy if she has had a very substantial neoadjuvant response maybe she could become a candidate for removal of the tumors and subsequently radiation therapy.      I discussed with her the side effects of the treatment for Cytoxan and Adriamycin including alopecia, anemia, leukopenia, thrombocytopenia, nausea, vomiting, hemorrhagic cystitis among others and for the Adriamycin, cardiomyopathy.  In preparation for cardiac ejection fraction it is impossible for this patient to have a heart ultrasound given the complete compromise of the right chest wall by malignancy.  Therefore, I am going to go ahead and schedule a MUGA ejection fraction.      I discussed with her also the  side effects of Taxol including neuropathy.    The patient will have formal chemotherapy teaching, education and consent and has been referred to see Joby Barron MD in order to proceed with placement of a port in the right subclavian position.     I discussed all these facts with the patient and her brother in detail. I told her we are going to be as aggressive as we can under the present circumstances to see if we can clean her disease as much as we can and maybe make her become a surgical candidate down the road and deliver further therapy depending on the circumstances.  We will be alert to review the pathological analysis of the specimen obtained yesterday.    She has iron deficiency anemia , b12 and folate were normal , she will start ferrous sulfate 325 mg every other day

## 2019-03-07 ENCOUNTER — LAB (OUTPATIENT)
Dept: LAB | Facility: HOSPITAL | Age: 59
End: 2019-03-07

## 2019-03-07 ENCOUNTER — CLINICAL SUPPORT (OUTPATIENT)
Dept: ONCOLOGY | Facility: HOSPITAL | Age: 59
End: 2019-03-07

## 2019-03-07 VITALS
HEART RATE: 81 BPM | DIASTOLIC BLOOD PRESSURE: 80 MMHG | OXYGEN SATURATION: 96 % | TEMPERATURE: 99.1 F | SYSTOLIC BLOOD PRESSURE: 130 MMHG

## 2019-03-07 DIAGNOSIS — C50.812 MALIGNANT NEOPLASM OF OVERLAPPING SITES OF LEFT BREAST IN FEMALE, ESTROGEN RECEPTOR POSITIVE (HCC): ICD-10-CM

## 2019-03-07 DIAGNOSIS — Z80.3 FAMILY HISTORY OF BREAST CANCER IN FEMALE: ICD-10-CM

## 2019-03-07 DIAGNOSIS — Z45.2 FITTING AND ADJUSTMENT OF VASCULAR CATHETER: ICD-10-CM

## 2019-03-07 DIAGNOSIS — Z17.0 MALIGNANT NEOPLASM OF OVERLAPPING SITES OF LEFT BREAST IN FEMALE, ESTROGEN RECEPTOR POSITIVE (HCC): ICD-10-CM

## 2019-03-07 DIAGNOSIS — R22.31 AXILLARY MASS, RIGHT: ICD-10-CM

## 2019-03-07 DIAGNOSIS — D63.0 ANEMIA IN NEOPLASTIC DISEASE: ICD-10-CM

## 2019-03-07 DIAGNOSIS — C50.812 MALIGNANT NEOPLASM OF OVERLAPPING SITES OF LEFT FEMALE BREAST, UNSPECIFIED ESTROGEN RECEPTOR STATUS (HCC): ICD-10-CM

## 2019-03-07 LAB
BASOPHILS # BLD AUTO: 0.06 10*3/MM3 (ref 0–0.2)
BASOPHILS NFR BLD AUTO: 7.1 % (ref 0–1.5)
DEPRECATED RDW RBC AUTO: 47.4 FL (ref 37–54)
EOSINOPHIL # BLD AUTO: 0.04 10*3/MM3 (ref 0–0.4)
EOSINOPHIL NFR BLD AUTO: 4.7 % (ref 0.3–6.2)
ERYTHROCYTE [DISTWIDTH] IN BLOOD BY AUTOMATED COUNT: 15.5 % (ref 12.3–15.4)
HCT VFR BLD AUTO: 29.1 % (ref 34–46.6)
HGB BLD-MCNC: 9.1 G/DL (ref 12–15.9)
IMM GRANULOCYTES # BLD AUTO: 0.01 10*3/MM3 (ref 0–0.05)
IMM GRANULOCYTES NFR BLD AUTO: 1.2 % (ref 0–0.5)
LYMPHOCYTES # BLD AUTO: 0.54 10*3/MM3 (ref 0.7–3.1)
LYMPHOCYTES NFR BLD AUTO: 63.5 % (ref 19.6–45.3)
MCH RBC QN AUTO: 26.4 PG (ref 26.6–33)
MCHC RBC AUTO-ENTMCNC: 31.3 G/DL (ref 31.5–35.7)
MCV RBC AUTO: 84.3 FL (ref 79–97)
MONOCYTES # BLD AUTO: 0.1 10*3/MM3 (ref 0.1–0.9)
MONOCYTES NFR BLD AUTO: 11.8 % (ref 5–12)
NEUTROPHILS # BLD AUTO: 0.1 10*3/MM3 (ref 1.4–7)
NEUTROPHILS NFR BLD AUTO: 11.7 % (ref 42.7–76)
NRBC BLD AUTO-RTO: 0 /100 WBC (ref 0–0)
PLATELET # BLD AUTO: 209 10*3/MM3 (ref 140–450)
PMV BLD AUTO: 9.2 FL (ref 6–12)
RBC # BLD AUTO: 3.45 10*6/MM3 (ref 3.77–5.28)
WBC NRBC COR # BLD: 0.85 10*3/MM3 (ref 3.4–10.8)

## 2019-03-07 PROCEDURE — 36415 COLL VENOUS BLD VENIPUNCTURE: CPT | Performed by: INTERNAL MEDICINE

## 2019-03-07 PROCEDURE — 85025 COMPLETE CBC W/AUTO DIFF WBC: CPT | Performed by: INTERNAL MEDICINE

## 2019-03-07 RX ORDER — METRONIDAZOLE 10 MG/G
GEL TOPICAL DAILY
Qty: 1 TUBE | Refills: 1 | Status: SHIPPED | OUTPATIENT
Start: 2019-03-07 | End: 2019-08-20

## 2019-03-07 NOTE — PROGRESS NOTES
Pt is here for lab with RN review.  CBC reviewed Dr. Dixon. Per Dr. Dixon pt is to continue taking Keflex TID. New script for Metrogel sent in for Breast ulceration.. Pt has no complaints.Reviewed neutropenic precaution and infection prevention. Pt knows when to seek medical attention.. VSS.    Copy of labs given to pt and f/u appt reviewed. Pt is instructed to call with concerns prior to next visit..   Lab Results   Component Value Date    WBC 0.85 (C) 03/07/2019    HGB 9.1 (L) 03/07/2019    HCT 29.1 (L) 03/07/2019    MCV 84.3 03/07/2019     03/07/2019

## 2019-03-14 ENCOUNTER — LAB (OUTPATIENT)
Dept: LAB | Facility: HOSPITAL | Age: 59
End: 2019-03-14

## 2019-03-14 ENCOUNTER — CLINICAL SUPPORT (OUTPATIENT)
Dept: ONCOLOGY | Facility: HOSPITAL | Age: 59
End: 2019-03-14

## 2019-03-14 VITALS — TEMPERATURE: 98.9 F

## 2019-03-14 DIAGNOSIS — C50.812 MALIGNANT NEOPLASM OF OVERLAPPING SITES OF LEFT BREAST IN FEMALE, ESTROGEN RECEPTOR POSITIVE (HCC): ICD-10-CM

## 2019-03-14 DIAGNOSIS — Z17.0 MALIGNANT NEOPLASM OF OVERLAPPING SITES OF LEFT BREAST IN FEMALE, ESTROGEN RECEPTOR POSITIVE (HCC): ICD-10-CM

## 2019-03-14 DIAGNOSIS — C50.812 MALIGNANT NEOPLASM OF OVERLAPPING SITES OF LEFT FEMALE BREAST, UNSPECIFIED ESTROGEN RECEPTOR STATUS (HCC): ICD-10-CM

## 2019-03-14 DIAGNOSIS — Z80.3 FAMILY HISTORY OF BREAST CANCER IN FEMALE: ICD-10-CM

## 2019-03-14 DIAGNOSIS — Z45.2 FITTING AND ADJUSTMENT OF VASCULAR CATHETER: ICD-10-CM

## 2019-03-14 DIAGNOSIS — R22.31 AXILLARY MASS, RIGHT: ICD-10-CM

## 2019-03-14 DIAGNOSIS — D63.0 ANEMIA IN NEOPLASTIC DISEASE: ICD-10-CM

## 2019-03-14 LAB
BASOPHILS # BLD AUTO: 0.09 10*3/MM3 (ref 0–0.2)
BASOPHILS NFR BLD AUTO: 1.3 % (ref 0–1.5)
DEPRECATED RDW RBC AUTO: 50.6 FL (ref 37–54)
EOSINOPHIL # BLD AUTO: 0.03 10*3/MM3 (ref 0–0.4)
EOSINOPHIL NFR BLD AUTO: 0.4 % (ref 0.3–6.2)
ERYTHROCYTE [DISTWIDTH] IN BLOOD BY AUTOMATED COUNT: 17.2 % (ref 12.3–15.4)
HCT VFR BLD AUTO: 34.7 % (ref 34–46.6)
HGB BLD-MCNC: 10.7 G/DL (ref 12–15.9)
IMM GRANULOCYTES # BLD AUTO: 0.12 10*3/MM3 (ref 0–0.05)
IMM GRANULOCYTES NFR BLD AUTO: 1.7 % (ref 0–0.5)
LYMPHOCYTES # BLD AUTO: 0.86 10*3/MM3 (ref 0.7–3.1)
LYMPHOCYTES NFR BLD AUTO: 12.1 % (ref 19.6–45.3)
MCH RBC QN AUTO: 26.6 PG (ref 26.6–33)
MCHC RBC AUTO-ENTMCNC: 30.8 G/DL (ref 31.5–35.7)
MCV RBC AUTO: 86.3 FL (ref 79–97)
MONOCYTES # BLD AUTO: 0.74 10*3/MM3 (ref 0.1–0.9)
MONOCYTES NFR BLD AUTO: 10.4 % (ref 5–12)
NEUTROPHILS # BLD AUTO: 5.27 10*3/MM3 (ref 1.4–7)
NEUTROPHILS NFR BLD AUTO: 74.1 % (ref 42.7–76)
NRBC BLD AUTO-RTO: 0 /100 WBC (ref 0–0)
PLATELET # BLD AUTO: 214 10*3/MM3 (ref 140–450)
PMV BLD AUTO: 9.8 FL (ref 6–12)
RBC # BLD AUTO: 4.02 10*6/MM3 (ref 3.77–5.28)
WBC NRBC COR # BLD: 7.11 10*3/MM3 (ref 3.4–10.8)

## 2019-03-14 PROCEDURE — 36415 COLL VENOUS BLD VENIPUNCTURE: CPT | Performed by: INTERNAL MEDICINE

## 2019-03-14 PROCEDURE — 85025 COMPLETE CBC W/AUTO DIFF WBC: CPT | Performed by: INTERNAL MEDICINE

## 2019-03-14 NOTE — PROGRESS NOTES
CBC reviewed with pt. Counts stable. VSS. Pt states she is feeling well, voicing no complaints. Pt states she is off Keflex as of now. She has it at home and knows when she needs to start taking it. Copy of labs given to pt. She will return next week for MD visit and treatment. Advised pt to call with any questions/concerns. She v/u.       Lab Results   Component Value Date    WBC 7.11 03/14/2019    HGB 10.7 (L) 03/14/2019    HCT 34.7 03/14/2019    MCV 86.3 03/14/2019     03/14/2019

## 2019-03-22 ENCOUNTER — INFUSION (OUTPATIENT)
Dept: ONCOLOGY | Facility: HOSPITAL | Age: 59
End: 2019-03-22

## 2019-03-22 ENCOUNTER — OFFICE VISIT (OUTPATIENT)
Dept: ONCOLOGY | Facility: CLINIC | Age: 59
End: 2019-03-22

## 2019-03-22 VITALS
BODY MASS INDEX: 27.28 KG/M2 | OXYGEN SATURATION: 96 % | HEART RATE: 68 BPM | WEIGHT: 159.8 LBS | HEIGHT: 64 IN | DIASTOLIC BLOOD PRESSURE: 90 MMHG | RESPIRATION RATE: 16 BRPM | SYSTOLIC BLOOD PRESSURE: 138 MMHG | TEMPERATURE: 98.6 F

## 2019-03-22 DIAGNOSIS — C50.812 MALIGNANT NEOPLASM OF OVERLAPPING SITES OF LEFT BREAST IN FEMALE, ESTROGEN RECEPTOR POSITIVE (HCC): Primary | ICD-10-CM

## 2019-03-22 DIAGNOSIS — D63.0 ANEMIA IN NEOPLASTIC DISEASE: ICD-10-CM

## 2019-03-22 DIAGNOSIS — C50.812 MALIGNANT NEOPLASM OF OVERLAPPING SITES OF LEFT FEMALE BREAST, UNSPECIFIED ESTROGEN RECEPTOR STATUS (HCC): ICD-10-CM

## 2019-03-22 DIAGNOSIS — Z80.3 FAMILY HISTORY OF BREAST CANCER IN FEMALE: ICD-10-CM

## 2019-03-22 DIAGNOSIS — Z17.0 MALIGNANT NEOPLASM OF OVERLAPPING SITES OF LEFT BREAST IN FEMALE, ESTROGEN RECEPTOR POSITIVE (HCC): ICD-10-CM

## 2019-03-22 DIAGNOSIS — Z45.2 FITTING AND ADJUSTMENT OF VASCULAR CATHETER: ICD-10-CM

## 2019-03-22 DIAGNOSIS — C50.812 MALIGNANT NEOPLASM OF OVERLAPPING SITES OF LEFT BREAST IN FEMALE, ESTROGEN RECEPTOR POSITIVE (HCC): ICD-10-CM

## 2019-03-22 DIAGNOSIS — R22.31 AXILLARY MASS, RIGHT: ICD-10-CM

## 2019-03-22 DIAGNOSIS — Z17.0 MALIGNANT NEOPLASM OF OVERLAPPING SITES OF LEFT BREAST IN FEMALE, ESTROGEN RECEPTOR POSITIVE (HCC): Primary | ICD-10-CM

## 2019-03-22 DIAGNOSIS — C50.812 MALIGNANT NEOPLASM OF OVERLAPPING SITES OF LEFT FEMALE BREAST, UNSPECIFIED ESTROGEN RECEPTOR STATUS (HCC): Primary | ICD-10-CM

## 2019-03-22 LAB
ALBUMIN SERPL-MCNC: 4 G/DL (ref 3.5–5.2)
ALBUMIN/GLOB SERPL: 1.5 G/DL (ref 1.1–2.4)
ALP SERPL-CCNC: 90 U/L (ref 38–116)
ALT SERPL W P-5'-P-CCNC: 18 U/L (ref 0–33)
ANION GAP SERPL CALCULATED.3IONS-SCNC: 12 MMOL/L
AST SERPL-CCNC: 21 U/L (ref 0–32)
BASOPHILS # BLD AUTO: 0.08 10*3/MM3 (ref 0–0.2)
BASOPHILS NFR BLD AUTO: 1.8 % (ref 0–1.5)
BILIRUB SERPL-MCNC: <0.2 MG/DL (ref 0.2–1.2)
BUN BLD-MCNC: 21 MG/DL (ref 6–20)
BUN/CREAT SERPL: 28.8 (ref 7.3–30)
CALCIUM SPEC-SCNC: 9.1 MG/DL (ref 8.5–10.2)
CHLORIDE SERPL-SCNC: 106 MMOL/L (ref 98–107)
CO2 SERPL-SCNC: 23 MMOL/L (ref 22–29)
CREAT BLD-MCNC: 0.73 MG/DL (ref 0.6–1.1)
DEPRECATED RDW RBC AUTO: 57.5 FL (ref 37–54)
EOSINOPHIL # BLD AUTO: 0.04 10*3/MM3 (ref 0–0.4)
EOSINOPHIL NFR BLD AUTO: 0.9 % (ref 0.3–6.2)
ERYTHROCYTE [DISTWIDTH] IN BLOOD BY AUTOMATED COUNT: 18.1 % (ref 12.3–15.4)
GFR SERPL CREATININE-BSD FRML MDRD: 81 ML/MIN/1.73
GLOBULIN UR ELPH-MCNC: 2.7 GM/DL (ref 1.8–3.5)
GLUCOSE BLD-MCNC: 87 MG/DL (ref 74–124)
HCT VFR BLD AUTO: 31.8 % (ref 34–46.6)
HGB BLD-MCNC: 9.6 G/DL (ref 12–15.9)
IMM GRANULOCYTES # BLD AUTO: 0.02 10*3/MM3 (ref 0–0.05)
IMM GRANULOCYTES NFR BLD AUTO: 0.5 % (ref 0–0.5)
LYMPHOCYTES # BLD AUTO: 0.58 10*3/MM3 (ref 0.7–3.1)
LYMPHOCYTES NFR BLD AUTO: 13.2 % (ref 19.6–45.3)
MCH RBC QN AUTO: 26.6 PG (ref 26.6–33)
MCHC RBC AUTO-ENTMCNC: 30.2 G/DL (ref 31.5–35.7)
MCV RBC AUTO: 88.1 FL (ref 79–97)
MONOCYTES # BLD AUTO: 0.51 10*3/MM3 (ref 0.1–0.9)
MONOCYTES NFR BLD AUTO: 11.6 % (ref 5–12)
NEUTROPHILS # BLD AUTO: 3.15 10*3/MM3 (ref 1.4–7)
NEUTROPHILS NFR BLD AUTO: 72 % (ref 42.7–76)
NRBC BLD AUTO-RTO: 0 /100 WBC (ref 0–0)
PLATELET # BLD AUTO: 367 10*3/MM3 (ref 140–450)
PMV BLD AUTO: 8.2 FL (ref 6–12)
POTASSIUM BLD-SCNC: 4.3 MMOL/L (ref 3.5–4.7)
PROT SERPL-MCNC: 6.7 G/DL (ref 6.3–8)
RBC # BLD AUTO: 3.61 10*6/MM3 (ref 3.77–5.28)
SODIUM BLD-SCNC: 141 MMOL/L (ref 134–145)
WBC NRBC COR # BLD: 4.38 10*3/MM3 (ref 3.4–10.8)

## 2019-03-22 PROCEDURE — 96413 CHEMO IV INFUSION 1 HR: CPT | Performed by: INTERNAL MEDICINE

## 2019-03-22 PROCEDURE — 25010000002 FOSAPREPITANT PER 1 MG: Performed by: INTERNAL MEDICINE

## 2019-03-22 PROCEDURE — 96367 TX/PROPH/DG ADDL SEQ IV INF: CPT | Performed by: INTERNAL MEDICINE

## 2019-03-22 PROCEDURE — 25010000003 DEXAMETHASONE SODIUM PHOSPHATE 100 MG/10ML SOLUTION: Performed by: INTERNAL MEDICINE

## 2019-03-22 PROCEDURE — 25010000002 PEGFILGRASTIM 6 MG/0.6ML PREFILLED SYRINGE KIT: Performed by: INTERNAL MEDICINE

## 2019-03-22 PROCEDURE — 96411 CHEMO IV PUSH ADDL DRUG: CPT | Performed by: INTERNAL MEDICINE

## 2019-03-22 PROCEDURE — 80053 COMPREHEN METABOLIC PANEL: CPT

## 2019-03-22 PROCEDURE — 25010000002 CYCLOPHOSPHAMIDE PER 100 MG: Performed by: INTERNAL MEDICINE

## 2019-03-22 PROCEDURE — 96377 APPLICATON ON-BODY INJECTOR: CPT | Performed by: INTERNAL MEDICINE

## 2019-03-22 PROCEDURE — 25010000002 DOXORUBICIN PER 10 MG: Performed by: INTERNAL MEDICINE

## 2019-03-22 PROCEDURE — 96375 TX/PRO/DX INJ NEW DRUG ADDON: CPT | Performed by: INTERNAL MEDICINE

## 2019-03-22 PROCEDURE — 25010000002 PALONOSETRON PER 25 MCG: Performed by: INTERNAL MEDICINE

## 2019-03-22 PROCEDURE — 99215 OFFICE O/P EST HI 40 MIN: CPT | Performed by: INTERNAL MEDICINE

## 2019-03-22 PROCEDURE — 85025 COMPLETE CBC W/AUTO DIFF WBC: CPT

## 2019-03-22 RX ORDER — PALONOSETRON 0.05 MG/ML
0.25 INJECTION, SOLUTION INTRAVENOUS ONCE
Status: CANCELLED | OUTPATIENT
Start: 2019-03-22

## 2019-03-22 RX ORDER — DOXORUBICIN HYDROCHLORIDE 2 MG/ML
60 INJECTION, SOLUTION INTRAVENOUS ONCE
Status: CANCELLED | OUTPATIENT
Start: 2019-03-22

## 2019-03-22 RX ORDER — SODIUM CHLORIDE 9 MG/ML
250 INJECTION, SOLUTION INTRAVENOUS ONCE
Status: CANCELLED | OUTPATIENT
Start: 2019-03-22

## 2019-03-22 RX ORDER — SODIUM CHLORIDE 0.9 % (FLUSH) 0.9 %
10 SYRINGE (ML) INJECTION AS NEEDED
Status: CANCELLED | OUTPATIENT
Start: 2019-03-22

## 2019-03-22 RX ORDER — DOXORUBICIN HYDROCHLORIDE 2 MG/ML
60 INJECTION, SOLUTION INTRAVENOUS ONCE
Status: COMPLETED | OUTPATIENT
Start: 2019-03-22 | End: 2019-03-22

## 2019-03-22 RX ORDER — SODIUM CHLORIDE 9 MG/ML
250 INJECTION, SOLUTION INTRAVENOUS ONCE
Status: COMPLETED | OUTPATIENT
Start: 2019-03-22 | End: 2019-03-22

## 2019-03-22 RX ORDER — PALONOSETRON 0.05 MG/ML
0.25 INJECTION, SOLUTION INTRAVENOUS ONCE
Status: COMPLETED | OUTPATIENT
Start: 2019-03-22 | End: 2019-03-22

## 2019-03-22 RX ADMIN — SODIUM CHLORIDE, PRESERVATIVE FREE 500 UNITS: 5 INJECTION INTRAVENOUS at 11:33

## 2019-03-22 RX ADMIN — DOXORUBICIN HYDROCHLORIDE 108 MG: 2 INJECTION, SOLUTION INTRAVENOUS at 10:30

## 2019-03-22 RX ADMIN — DEXAMETHASONE SODIUM PHOSPHATE 12 MG: 10 INJECTION, SOLUTION INTRAMUSCULAR; INTRAVENOUS at 09:56

## 2019-03-22 RX ADMIN — PEGFILGRASTIM 6 MG: KIT SUBCUTANEOUS at 11:36

## 2019-03-22 RX ADMIN — PALONOSETRON HYDROCHLORIDE 0.25 MG: 0.05 INJECTION, SOLUTION INTRAVENOUS at 09:18

## 2019-03-22 RX ADMIN — SODIUM CHLORIDE 250 ML: 9 INJECTION, SOLUTION INTRAVENOUS at 09:18

## 2019-03-22 RX ADMIN — CYCLOPHOSPHAMIDE 1080 MG: 1 INJECTION, POWDER, FOR SOLUTION INTRAVENOUS; ORAL at 10:46

## 2019-03-22 RX ADMIN — SODIUM CHLORIDE 100 ML: 9 INJECTION, SOLUTION INTRAVENOUS at 09:20

## 2019-03-22 NOTE — PROGRESS NOTES
Subjective     REASON FOR FOLLOW UP:  1. 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 R BREAST MASS WITH GIANT R AXILLARY ADENOPATHY.  3. FAMILY HISTORY OF BREAST CANCER IN MOTHER AND SISTER, SISTER WAS TESTED FOR BRCA AND WAS NEGATIVE.                                       History of Present Illness    This patient returns today to the office for followup in company of her brother stating the mass in the right axilla has further shrunk very dramatically after the 2nd cycle of treatment and the one on the left has dramatically improved to the point it has stopped bleeding.  She has been applying some MetroGel at times because of development of odor in the ulcerated order and actually this has healed too.  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  She has not had any bone pain associated with Neulasta.  She has not had any infections in spite having a low white count mp.  The patient has had a good level of energy, terrific appetite, stable weight, normal bowel function, normal urination, no cardiovascular or respiratory issues.  No pain at any level.  ECOG performance status is 0.  She continues riding and training horses.          Past Medical History:   Diagnosis Date   • Arthritis    • Breast cancer (CMS/HCC)     Left   • Hip pain     RIGHT HIP... CYST   • History of fracture of leg 1987   • Skin sore     OPEN SORE LEFT BREAST           Past Surgical History:   Procedure Laterality Date   • FRACTURE SURGERY  1987    Leg   • HARDWARE REMOVAL     • VENOUS ACCESS DEVICE (PORT) INSERTION Right 2/1/2019    Procedure: INSERTION VENOUS ACCESS DEVICE;  Surgeon: Joby Barron Jr., MD;  [place] [facility] [place] [facility] [place]  Service: General        Current Outpatient Medications on File Prior to Visit   Medication Sig Dispense Refill   • acetaminophen (TYLENOL) 500 MG tablet Take 500 mg by mouth Every 6  (Six) Hours As Needed for Mild Pain .     • cephalexin (KEFLEX) 500 MG capsule Take 1 capsule by mouth 3 (Three) Times a Day. 21 capsule 3   • Cholecalciferol (VITAMIN D-3) 5000 units tablet Take  by mouth.     • dexamethasone (DECADRON) 4 MG tablet Take 2 tablets in the morning daily on Days 2, 3 & 4.  Take with food. 6 tablet 3   • diclofenac sodium (VOTAREN XR) 100 MG 24 hr tablet Take 100 mg by mouth Daily.     • IBUPROFEN PO Take 1 tablet by mouth As Needed.     • metroNIDAZOLE (METROGEL) 1 % gel Apply  topically to the appropriate area as directed Daily. 1 tube 1   • ondansetron (ZOFRAN) 8 MG tablet Take 1 tablet by mouth 3 (Three) Times a Day As Needed for Nausea or Vomiting. 30 tablet 5   • oxyCODONE-acetaminophen (PERCOCET) 5-325 MG per tablet 1 q 6 hrs prn pain 20 tablet 0   • coenzyme Q10 100 MG capsule Take 100 mg by mouth Daily.     • ferrous sulfate 325 (65 FE) MG tablet Take 1 tablet by mouth Every Other Day. 15 tablet 1   • Krill Oil 1000 MG capsule Take 1,000 mg by mouth Daily.       No current facility-administered medications on file prior to visit.         ALLERGIES:    Allergies   Allergen Reactions   • Erythromycin GI Intolerance   • Levaquin [Levofloxacin] GI Intolerance   • Penicillins GI Intolerance        Social History     Socioeconomic History   • Marital status:      Spouse name: Cruz   • Number of children: 0   • Years of education: Not on file   • Highest education level: Not on file   Occupational History   • Occupation:      Employer: SELF-EMPLOYED   Social Needs   • Financial resource strain: Not hard at all   • Food insecurity:     Worry: Never true     Inability: Never true   • Transportation needs:     Medical: No     Non-medical: No   Tobacco Use   • Smoking status: Never Smoker   • Smokeless tobacco: Never Used   Substance and Sexual Activity   • Alcohol use: Yes     Alcohol/week: 4.2 oz     Types: 4 Glasses of wine, 3 Cans of beer per week     Frequency:  2-3 times a week     Drinks per session: 1 or 2     Binge frequency: Never     Comment: OCCASIONALLY, NONE IN LAST TWO DAYS   • Drug use: No   • Sexual activity: Not Currently     Partners: Male     Birth control/protection: Post-menopausal   Lifestyle   • Physical activity:     Days per week: 7 days     Minutes per session: 120 min   • Stress: Only a little        Family History   Problem Relation Age of Onset   • Lung cancer Father    • Cancer Mother    • Breast cancer Mother    • Liver disease Mother    • Breast cancer Sister 48   • Breast cancer Maternal Grandmother    • Breast cancer Paternal Grandmother    • Breast cancer Maternal Uncle    • Malig Hyperthermia Neg Hx         Review of Systems               General: no fever, no chills, no fatigue,no weight changes, no lack of appetite.  Eyes: no epiphora, xerophthalmia,conjunctivitis, pain, glaucoma, blurred vision, blindness, secretion, photophobia, proptosis, diplopia.  Ears: no otorrhea, tinnitus, otorrhagia, deafness, pain, vertigo.  Nose: no rhinorrhea, no epistaxis, no alteration in perception of odors, no sinuses pressure.  Mouth: no alteration in gums or teeth,  No ulcers, no difficulty with mastication or deglut ion, no odynophagia.  Neck: no masses or pain, no thyroid alterations, no pain in muscles or arteries, no carotid odynia, no crepitation.  Respiratory: no cough, no sputum production,no dyspnea,no trepopnea, no pleuritic pain,no hemoptysis.  Heart: no syncope, no irregularity, no palpitations, no angina,no orthopnea,no paroxysmal nocturnal dyspnea.  Vascular Venous: no tenderness,no edema,no palpable cords,no postphlebitic syndrome, no skin changes no ulcerations.  Vascular Arterial: no distal ischemia, noclaudication, no gangrene, no neuropathic ischemic pain, no skin ulcers, no paleness no cyanosis.  GI: no dysphagia, no odynophagia, no regurgitation, no heartburn,no indigestion,no nausea,no vomiting,no hematemesis ,no melena,no jaundice,no  "distention, no obstipation,no enterorrhagia,no proctalgia,no anal  lesions, no changes in bowel habits.  : no frequency, no hesitancy, no hematuria, no discharge,no  pain.  Musculoskeletal: no muscle or tendon pain or inflammation,no  joint pain, no edema, no functional limitation,no fasciculations, no mass.  Neurologic: no headache, no seizures, noalterations on Craneal nerves, no motor deficit, no sensory deficit, normal coordination, no alteration in memory,normal orientation, calculation,normal writting, verbal and written language.  Skin: no rashes,no pruritus no localized lesions.  Psychiatric: no anxiety, no depression,no agitation, no delusions, proper insight.      Objective     Vitals:    03/22/19 0817   BP: 138/90   Pulse: 68   Resp: 16   Temp: 98.6 °F (37 °C)   TempSrc: Oral   SpO2: 96%   Weight: 72.5 kg (159 lb 12.8 oz)   Height: 163 cm (64.17\")   PainSc: 3  Comment: RT hip pain     Current Status 3/22/2019   ECOG score 0       Physical Exam  Pain 0/ ecog 0    GENERAL:  Well-developed, well-nourished  Patient  in no acute distress.   SKIN:  Warm, dry ,NO rashes,NO purpura ,NO petechiae.  HEENT:  Pupils were equal and reactive to light and accomodation, conjunctivas non injected, no pterigion, normal extraocular movements, normal visual acuity.   Mouth mucosa was moist, no exudates in oropharynx, normal gum line, normal roof of the mouth and pillars, normal papillations of the tongue.  NECK:  Supple with good range of motion; no thyromegaly or masses, no JVD or bruits, no cervical adenopathies.No carotid arteries pain, no carotid abnormal pulsation , NO arterial dance.  LYMPHATICS:  No cervical, NO supraclavicular, NO axillary,NO epitrochlear , NO inguinal adenopathy.  CHEST:  Normal excursion of both luz thoraces, normal voice fremitus, no subcutaneous emphysema, normal axillas, no rashes or acanthosis nigricans. Lungs clear to percussion and auscultation, normal breath sounds bilaterally, no " wheezing,NO crackles NO ronchi, NO stridor, NO rubs.  CARDIAC AND VASCULAR:  normal rate and regular rhythm, without murmurs,NO rubs NO S3 NO S4 right or left . Normal femoral, popliteal, pedis, brachial and carotid pulses.  ABDOMEN:  Soft, nontender with no organomegaly or masses, no ascites, no collateral circulation,no distention,no Vernon sign, no abdominal pain, no inguinal hernias,no umbilical hernia, no abdominal bruits. Normal bowel sounds.  GENITAL: Not  Performed.  EXTREMITIES  AND SPINE:  No clubbing, cyanosis or edema, no deformities or pain .No kyphosis, scoliosis, deformities or pain in spine, ribs or pelvic bone.  NEUROLOGICAL:  Patient was awake, alert, oriented to time, person and place.  The mass in the right axilla has dramatically shrunk to 3 cm in diameter, is very hard, nontender with no fluctuation and is irregular.  The right breast discloses no abnormalities, the nipple is not pulled in, there is no erythema and no palpable masses.      The left breast now is almost the same size as the right breast but still with ulceration lesion exposed in the upper outer quadrant with no odor, but the mass is dramatically smaller and the breast is almost half the size of what it was at the time of the original diagnosis.  The left axilla discloses no obvious fullness or palpable abnormalities.                          RECENT LABS:  Hematology WBC   Date Value Ref Range Status   03/22/2019 4.38 3.40 - 10.80 10*3/mm3 Final     RBC   Date Value Ref Range Status   03/22/2019 3.61 (L) 3.77 - 5.28 10*6/mm3 Final     Hemoglobin   Date Value Ref Range Status   03/22/2019 9.6 (L) 12.0 - 15.9 g/dL Final     Hematocrit   Date Value Ref Range Status   03/22/2019 31.8 (L) 34.0 - 46.6 % Final     Platelets   Date Value Ref Range Status   03/22/2019 367 140 - 450 10*3/mm3 Final              Assessment/Plan  In summary, this patient is a 59-year-old white female who [place] who has been staying in [place] for the winter. At least for the last 4 years, she has had an in crescendo mass in the left breast that has produced a gigantic tumor that is now close to 25 cm in size and is replacing most of the anatomy of the left breast. There are areas of ulceration necrosis and bleeding and the mass is fixed to the chest wall. She has abundant amount of collateral circulation in the left anterior chest wall and it caught my attention the lack of any left axillary adenopathy. She has no lymphedema in the left upper extremity. In the right breast while in sitting position, no palpable abnormalities are documented. The right breast skin and nipple are normal. When the patient lies flat, there is a palpable mass at 9 o'clock from the nipple that measures probably 4 cm in size, very indistinct in regard to edges and margins. There was no mobility and no areas of tenderness. She has a giant adenopathy in the right axilla that measures close to 9 cm in size, uniform, no fluctuation, nontender, completely fixed to the axillary wall. There is no compromise of the skin.       Since the previous visit the patient has entertained the 2nd cycle of chemotherapy with A/C and this has further produced dramatic shrinking of her tumors involving the left breast and right axilla.      As stated, the patient has had very dramatic improvement in the size of her tumor lesions.  Performance status is ECOG 0, she has no cancer-related pain and has tolerated treatment extremely well.  She has developed anemia of neoplastic disease and chemotherapy that she has been able to handle with no difficulty.  She has allergic conjunctivitis and wants some medicine for this.      Overall, I think the patient is doing fantastic and she coming up on her genetics appointment next week that will be crucial to define given the family history, any other issues.    Therefore, my advice to her is as  follows:  1.  Proceed with A/C today at the same dosing as before and her Neulasta Onpro body device.   2.  Take Patanol eye drops; 2 drops in the eyes 2-3 times a day.  I gave her measures in order to control pollen count in her face and upper body.   3.  Patient will take Voltaren on p.r.n. 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  4.  Patient will proceed with blood counts on a weekly basis.  5.  Return in 3 weeks for her next and last cycle of A/C.  6.  After completion of the 4th cycle of A/C the patient will be initiated on Taxol on a weekly basis x12 weeks.   7.  After completion of Taxol she will be rescanned completely.  8.  After that consideration will be given to bilateral mastectomy and axillary sampling.   9.  Thereafter, patient will entertain radiation therapy to the chest wall and axilla.  10. Proceed with adjuvant hormonal therapy after completion of her chemotherapy as well.    I discussed all these facts with the patient and her brother in detail and she was ready to proceed.

## 2019-03-29 ENCOUNTER — CLINICAL SUPPORT (OUTPATIENT)
Dept: ONCOLOGY | Facility: HOSPITAL | Age: 59
End: 2019-03-29

## 2019-03-29 ENCOUNTER — LAB (OUTPATIENT)
Dept: LAB | Facility: HOSPITAL | Age: 59
End: 2019-03-29

## 2019-03-29 VITALS — TEMPERATURE: 98 F

## 2019-03-29 DIAGNOSIS — C50.812 MALIGNANT NEOPLASM OF OVERLAPPING SITES OF LEFT BREAST IN FEMALE, ESTROGEN RECEPTOR POSITIVE (HCC): ICD-10-CM

## 2019-03-29 DIAGNOSIS — R22.31 AXILLARY MASS, RIGHT: ICD-10-CM

## 2019-03-29 DIAGNOSIS — C50.812 MALIGNANT NEOPLASM OF OVERLAPPING SITES OF LEFT FEMALE BREAST, UNSPECIFIED ESTROGEN RECEPTOR STATUS (HCC): ICD-10-CM

## 2019-03-29 DIAGNOSIS — Z80.3 FAMILY HISTORY OF BREAST CANCER IN FEMALE: ICD-10-CM

## 2019-03-29 DIAGNOSIS — Z17.0 MALIGNANT NEOPLASM OF OVERLAPPING SITES OF LEFT BREAST IN FEMALE, ESTROGEN RECEPTOR POSITIVE (HCC): ICD-10-CM

## 2019-03-29 DIAGNOSIS — Z45.2 FITTING AND ADJUSTMENT OF VASCULAR CATHETER: ICD-10-CM

## 2019-03-29 LAB
BASOPHILS # BLD AUTO: 0.04 10*3/MM3 (ref 0–0.2)
BASOPHILS NFR BLD AUTO: 5.8 % (ref 0–1.5)
DEPRECATED RDW RBC AUTO: 55.4 FL (ref 37–54)
EOSINOPHIL # BLD AUTO: 0.06 10*3/MM3 (ref 0–0.4)
EOSINOPHIL NFR BLD AUTO: 8.7 % (ref 0.3–6.2)
ERYTHROCYTE [DISTWIDTH] IN BLOOD BY AUTOMATED COUNT: 17.7 % (ref 12.3–15.4)
HCT VFR BLD AUTO: 30.3 % (ref 34–46.6)
HGB BLD-MCNC: 9.5 G/DL (ref 12–15.9)
IMM GRANULOCYTES # BLD AUTO: 0 10*3/MM3 (ref 0–0.05)
IMM GRANULOCYTES NFR BLD AUTO: 0 % (ref 0–0.5)
LYMPHOCYTES # BLD AUTO: 0.41 10*3/MM3 (ref 0.7–3.1)
LYMPHOCYTES NFR BLD AUTO: 59.4 % (ref 19.6–45.3)
MCH RBC QN AUTO: 26.8 PG (ref 26.6–33)
MCHC RBC AUTO-ENTMCNC: 31.4 G/DL (ref 31.5–35.7)
MCV RBC AUTO: 85.4 FL (ref 79–97)
MONOCYTES # BLD AUTO: 0.11 10*3/MM3 (ref 0.1–0.9)
MONOCYTES NFR BLD AUTO: 15.9 % (ref 5–12)
NEUTROPHILS # BLD AUTO: 0.07 10*3/MM3 (ref 1.4–7)
NEUTROPHILS NFR BLD AUTO: 10.2 % (ref 42.7–76)
NRBC BLD AUTO-RTO: 0 /100 WBC (ref 0–0)
PLATELET # BLD AUTO: 159 10*3/MM3 (ref 140–450)
PMV BLD AUTO: 9.3 FL (ref 6–12)
RBC # BLD AUTO: 3.55 10*6/MM3 (ref 3.77–5.28)
WBC NRBC COR # BLD: 0.69 10*3/MM3 (ref 3.4–10.8)

## 2019-03-29 PROCEDURE — 36416 COLLJ CAPILLARY BLOOD SPEC: CPT | Performed by: INTERNAL MEDICINE

## 2019-03-29 PROCEDURE — 85025 COMPLETE CBC W/AUTO DIFF WBC: CPT | Performed by: INTERNAL MEDICINE

## 2019-03-29 RX ORDER — OLOPATADINE HYDROCHLORIDE 1 MG/ML
1 SOLUTION/ DROPS OPHTHALMIC 2 TIMES DAILY
Qty: 5 ML | Refills: 5 | Status: SHIPPED | OUTPATIENT
Start: 2019-03-29 | End: 2020-02-12 | Stop reason: SDUPTHER

## 2019-04-02 ENCOUNTER — CLINICAL SUPPORT (OUTPATIENT)
Dept: OTHER | Facility: HOSPITAL | Age: 59
End: 2019-04-02

## 2019-04-02 DIAGNOSIS — Z17.0 MALIGNANT NEOPLASM OF OVERLAPPING SITES OF LEFT BREAST IN FEMALE, ESTROGEN RECEPTOR POSITIVE (HCC): Primary | ICD-10-CM

## 2019-04-02 DIAGNOSIS — C50.812 MALIGNANT NEOPLASM OF OVERLAPPING SITES OF LEFT BREAST IN FEMALE, ESTROGEN RECEPTOR POSITIVE (HCC): Primary | ICD-10-CM

## 2019-04-02 PROCEDURE — G0463 HOSPITAL OUTPT CLINIC VISIT: HCPCS

## 2019-04-02 NOTE — PATIENT INSTRUCTIONS
Pt seen by Dr. Phillips for genetic counseling and testing. Lab drawn with 21g butterfly needle in RIght  AC x 1 attempt. Sent to [facility]. Pt informed she would receive a phone call when test results.

## 2019-04-05 ENCOUNTER — CLINICAL SUPPORT (OUTPATIENT)
Dept: ONCOLOGY | Facility: HOSPITAL | Age: 59
End: 2019-04-05

## 2019-04-05 ENCOUNTER — LAB (OUTPATIENT)
Dept: LAB | Facility: HOSPITAL | Age: 59
End: 2019-04-05

## 2019-04-05 VITALS
OXYGEN SATURATION: 94 % | HEART RATE: 80 BPM | DIASTOLIC BLOOD PRESSURE: 87 MMHG | SYSTOLIC BLOOD PRESSURE: 150 MMHG | TEMPERATURE: 98.8 F

## 2019-04-05 DIAGNOSIS — C50.812 MALIGNANT NEOPLASM OF OVERLAPPING SITES OF LEFT BREAST IN FEMALE, ESTROGEN RECEPTOR POSITIVE (HCC): ICD-10-CM

## 2019-04-05 DIAGNOSIS — C50.812 MALIGNANT NEOPLASM OF OVERLAPPING SITES OF LEFT FEMALE BREAST, UNSPECIFIED ESTROGEN RECEPTOR STATUS (HCC): ICD-10-CM

## 2019-04-05 DIAGNOSIS — R22.31 AXILLARY MASS, RIGHT: ICD-10-CM

## 2019-04-05 DIAGNOSIS — Z80.3 FAMILY HISTORY OF BREAST CANCER IN FEMALE: ICD-10-CM

## 2019-04-05 DIAGNOSIS — Z17.0 MALIGNANT NEOPLASM OF OVERLAPPING SITES OF LEFT BREAST IN FEMALE, ESTROGEN RECEPTOR POSITIVE (HCC): ICD-10-CM

## 2019-04-05 DIAGNOSIS — Z45.2 FITTING AND ADJUSTMENT OF VASCULAR CATHETER: ICD-10-CM

## 2019-04-05 LAB
BASOPHILS # BLD AUTO: 0.1 10*3/MM3 (ref 0–0.2)
BASOPHILS NFR BLD AUTO: 1 % (ref 0–1.5)
DEPRECATED RDW RBC AUTO: 59.3 FL (ref 37–54)
EOSINOPHIL # BLD AUTO: 0.03 10*3/MM3 (ref 0–0.4)
EOSINOPHIL NFR BLD AUTO: 0.3 % (ref 0.3–6.2)
ERYTHROCYTE [DISTWIDTH] IN BLOOD BY AUTOMATED COUNT: 19.6 % (ref 12.3–15.4)
HCT VFR BLD AUTO: 29.8 % (ref 34–46.6)
HGB BLD-MCNC: 9.3 G/DL (ref 12–15.9)
IMM GRANULOCYTES # BLD AUTO: 0.12 10*3/MM3 (ref 0–0.05)
IMM GRANULOCYTES NFR BLD AUTO: 1.2 % (ref 0–0.5)
LYMPHOCYTES # BLD AUTO: 0.93 10*3/MM3 (ref 0.7–3.1)
LYMPHOCYTES NFR BLD AUTO: 9.7 % (ref 19.6–45.3)
MCH RBC QN AUTO: 27 PG (ref 26.6–33)
MCHC RBC AUTO-ENTMCNC: 31.2 G/DL (ref 31.5–35.7)
MCV RBC AUTO: 86.6 FL (ref 79–97)
MONOCYTES # BLD AUTO: 0.84 10*3/MM3 (ref 0.1–0.9)
MONOCYTES NFR BLD AUTO: 8.7 % (ref 5–12)
NEUTROPHILS # BLD AUTO: 7.6 10*3/MM3 (ref 1.4–7)
NEUTROPHILS NFR BLD AUTO: 79.1 % (ref 42.7–76)
NRBC BLD AUTO-RTO: 0 /100 WBC (ref 0–0)
PLATELET # BLD AUTO: 231 10*3/MM3 (ref 140–450)
PMV BLD AUTO: 9.6 FL (ref 6–12)
RBC # BLD AUTO: 3.44 10*6/MM3 (ref 3.77–5.28)
WBC NRBC COR # BLD: 9.62 10*3/MM3 (ref 3.4–10.8)

## 2019-04-05 PROCEDURE — 85025 COMPLETE CBC W/AUTO DIFF WBC: CPT | Performed by: INTERNAL MEDICINE

## 2019-04-05 PROCEDURE — 36415 COLL VENOUS BLD VENIPUNCTURE: CPT | Performed by: INTERNAL MEDICINE

## 2019-04-05 NOTE — PROGRESS NOTES
Pt is here for lab with RN review.  CBC reviewed with pt, counts are stable for this pt at this time. Pt has no complaints and VSS.  Copy of labs given to pt and f/u appt reviewed. Pt is instructed to call with concerns prior to next visit. Pt V/U.   Lab Results   Component Value Date    WBC 9.62 04/05/2019    HGB 9.3 (L) 04/05/2019    HCT 29.8 (L) 04/05/2019    MCV 86.6 04/05/2019     04/05/2019

## 2019-04-09 DIAGNOSIS — C50.812 MALIGNANT NEOPLASM OF OVERLAPPING SITES OF LEFT FEMALE BREAST, UNSPECIFIED ESTROGEN RECEPTOR STATUS (HCC): ICD-10-CM

## 2019-04-12 ENCOUNTER — INFUSION (OUTPATIENT)
Dept: ONCOLOGY | Facility: HOSPITAL | Age: 59
End: 2019-04-12

## 2019-04-12 ENCOUNTER — OFFICE VISIT (OUTPATIENT)
Dept: ONCOLOGY | Facility: CLINIC | Age: 59
End: 2019-04-12

## 2019-04-12 VITALS
DIASTOLIC BLOOD PRESSURE: 91 MMHG | OXYGEN SATURATION: 95 % | SYSTOLIC BLOOD PRESSURE: 149 MMHG | BODY MASS INDEX: 27.16 KG/M2 | RESPIRATION RATE: 16 BRPM | TEMPERATURE: 98.5 F | HEART RATE: 69 BPM | WEIGHT: 159.1 LBS | HEIGHT: 64 IN

## 2019-04-12 DIAGNOSIS — D63.0 ANEMIA IN NEOPLASTIC DISEASE: ICD-10-CM

## 2019-04-12 DIAGNOSIS — Z17.0 MALIGNANT NEOPLASM OF OVERLAPPING SITES OF LEFT BREAST IN FEMALE, ESTROGEN RECEPTOR POSITIVE (HCC): Primary | ICD-10-CM

## 2019-04-12 DIAGNOSIS — C50.812 MALIGNANT NEOPLASM OF OVERLAPPING SITES OF LEFT FEMALE BREAST, UNSPECIFIED ESTROGEN RECEPTOR STATUS (HCC): ICD-10-CM

## 2019-04-12 DIAGNOSIS — Z45.2 FITTING AND ADJUSTMENT OF VASCULAR CATHETER: ICD-10-CM

## 2019-04-12 DIAGNOSIS — C50.812 MALIGNANT NEOPLASM OF OVERLAPPING SITES OF LEFT FEMALE BREAST, UNSPECIFIED ESTROGEN RECEPTOR STATUS (HCC): Primary | ICD-10-CM

## 2019-04-12 DIAGNOSIS — C50.812 MALIGNANT NEOPLASM OF OVERLAPPING SITES OF LEFT BREAST IN FEMALE, ESTROGEN RECEPTOR POSITIVE (HCC): Primary | ICD-10-CM

## 2019-04-12 DIAGNOSIS — Z80.3 FAMILY HISTORY OF BREAST CANCER IN FEMALE: ICD-10-CM

## 2019-04-12 LAB
ALBUMIN SERPL-MCNC: 4.4 G/DL (ref 3.5–5.2)
ALBUMIN/GLOB SERPL: 1.8 G/DL (ref 1.1–2.4)
ALP SERPL-CCNC: 87 U/L (ref 38–116)
ALT SERPL W P-5'-P-CCNC: 47 U/L (ref 0–33)
ANION GAP SERPL CALCULATED.3IONS-SCNC: 12.4 MMOL/L
AST SERPL-CCNC: 37 U/L (ref 0–32)
BASOPHILS # BLD AUTO: 0.06 10*3/MM3 (ref 0–0.2)
BASOPHILS NFR BLD AUTO: 1.4 % (ref 0–1.5)
BILIRUB SERPL-MCNC: 0.2 MG/DL (ref 0.2–1.2)
BUN BLD-MCNC: 24 MG/DL (ref 6–20)
BUN/CREAT SERPL: 33.3 (ref 7.3–30)
CALCIUM SPEC-SCNC: 9.3 MG/DL (ref 8.5–10.2)
CANCER AG15-3 SERPL-ACNC: 33.3 U/ML
CHLORIDE SERPL-SCNC: 105 MMOL/L (ref 98–107)
CO2 SERPL-SCNC: 23.6 MMOL/L (ref 22–29)
CREAT BLD-MCNC: 0.72 MG/DL (ref 0.6–1.1)
DEPRECATED RDW RBC AUTO: 64.6 FL (ref 37–54)
EOSINOPHIL # BLD AUTO: 0.02 10*3/MM3 (ref 0–0.4)
EOSINOPHIL NFR BLD AUTO: 0.5 % (ref 0.3–6.2)
ERYTHROCYTE [DISTWIDTH] IN BLOOD BY AUTOMATED COUNT: 20 % (ref 12.3–15.4)
GFR SERPL CREATININE-BSD FRML MDRD: 82 ML/MIN/1.73
GLOBULIN UR ELPH-MCNC: 2.5 GM/DL (ref 1.8–3.5)
GLUCOSE BLD-MCNC: 115 MG/DL (ref 74–124)
HCT VFR BLD AUTO: 31.1 % (ref 34–46.6)
HGB BLD-MCNC: 9.4 G/DL (ref 12–15.9)
IMM GRANULOCYTES # BLD AUTO: 0.01 10*3/MM3 (ref 0–0.05)
IMM GRANULOCYTES NFR BLD AUTO: 0.2 % (ref 0–0.5)
LYMPHOCYTES # BLD AUTO: 0.61 10*3/MM3 (ref 0.7–3.1)
LYMPHOCYTES NFR BLD AUTO: 14.3 % (ref 19.6–45.3)
MCH RBC QN AUTO: 27 PG (ref 26.6–33)
MCHC RBC AUTO-ENTMCNC: 30.2 G/DL (ref 31.5–35.7)
MCV RBC AUTO: 89.4 FL (ref 79–97)
MONOCYTES # BLD AUTO: 0.45 10*3/MM3 (ref 0.1–0.9)
MONOCYTES NFR BLD AUTO: 10.6 % (ref 5–12)
NEUTROPHILS # BLD AUTO: 3.11 10*3/MM3 (ref 1.4–7)
NEUTROPHILS NFR BLD AUTO: 73 % (ref 42.7–76)
NRBC BLD AUTO-RTO: 0 /100 WBC (ref 0–0)
PLATELET # BLD AUTO: 395 10*3/MM3 (ref 140–450)
PMV BLD AUTO: 8.4 FL (ref 6–12)
POTASSIUM BLD-SCNC: 4 MMOL/L (ref 3.5–4.7)
PROT SERPL-MCNC: 6.9 G/DL (ref 6.3–8)
RBC # BLD AUTO: 3.48 10*6/MM3 (ref 3.77–5.28)
SODIUM BLD-SCNC: 141 MMOL/L (ref 134–145)
WBC NRBC COR # BLD: 4.26 10*3/MM3 (ref 3.4–10.8)

## 2019-04-12 PROCEDURE — 25010000002 PEGFILGRASTIM 6 MG/0.6ML PREFILLED SYRINGE KIT: Performed by: INTERNAL MEDICINE

## 2019-04-12 PROCEDURE — 25010000002 CYCLOPHOSPHAMIDE PER 100 MG: Performed by: INTERNAL MEDICINE

## 2019-04-12 PROCEDURE — 96377 APPLICATON ON-BODY INJECTOR: CPT | Performed by: INTERNAL MEDICINE

## 2019-04-12 PROCEDURE — 25010000002 FOSAPREPITANT PER 1 MG: Performed by: INTERNAL MEDICINE

## 2019-04-12 PROCEDURE — 86300 IMMUNOASSAY TUMOR CA 15-3: CPT | Performed by: INTERNAL MEDICINE

## 2019-04-12 PROCEDURE — 25010000003 DEXAMETHASONE SODIUM PHOSPHATE 100 MG/10ML SOLUTION: Performed by: INTERNAL MEDICINE

## 2019-04-12 PROCEDURE — 96411 CHEMO IV PUSH ADDL DRUG: CPT | Performed by: INTERNAL MEDICINE

## 2019-04-12 PROCEDURE — 96375 TX/PRO/DX INJ NEW DRUG ADDON: CPT | Performed by: INTERNAL MEDICINE

## 2019-04-12 PROCEDURE — 85025 COMPLETE CBC W/AUTO DIFF WBC: CPT

## 2019-04-12 PROCEDURE — 96413 CHEMO IV INFUSION 1 HR: CPT | Performed by: INTERNAL MEDICINE

## 2019-04-12 PROCEDURE — 99215 OFFICE O/P EST HI 40 MIN: CPT | Performed by: INTERNAL MEDICINE

## 2019-04-12 PROCEDURE — 25010000002 DOXORUBICIN PER 10 MG: Performed by: INTERNAL MEDICINE

## 2019-04-12 PROCEDURE — 25010000002 PALONOSETRON PER 25 MCG: Performed by: INTERNAL MEDICINE

## 2019-04-12 PROCEDURE — 96367 TX/PROPH/DG ADDL SEQ IV INF: CPT | Performed by: INTERNAL MEDICINE

## 2019-04-12 PROCEDURE — 80053 COMPREHEN METABOLIC PANEL: CPT

## 2019-04-12 PROCEDURE — 36415 COLL VENOUS BLD VENIPUNCTURE: CPT | Performed by: INTERNAL MEDICINE

## 2019-04-12 RX ORDER — DOXORUBICIN HYDROCHLORIDE 2 MG/ML
60 INJECTION, SOLUTION INTRAVENOUS ONCE
Status: COMPLETED | OUTPATIENT
Start: 2019-04-12 | End: 2019-04-12

## 2019-04-12 RX ORDER — DOXORUBICIN HYDROCHLORIDE 2 MG/ML
60 INJECTION, SOLUTION INTRAVENOUS ONCE
Status: CANCELLED | OUTPATIENT
Start: 2019-04-12

## 2019-04-12 RX ORDER — SODIUM CHLORIDE 9 MG/ML
250 INJECTION, SOLUTION INTRAVENOUS ONCE
Status: COMPLETED | OUTPATIENT
Start: 2019-04-12 | End: 2019-04-12

## 2019-04-12 RX ORDER — SODIUM CHLORIDE 9 MG/ML
250 INJECTION, SOLUTION INTRAVENOUS ONCE
Status: CANCELLED | OUTPATIENT
Start: 2019-04-12

## 2019-04-12 RX ORDER — SODIUM CHLORIDE 0.9 % (FLUSH) 0.9 %
10 SYRINGE (ML) INJECTION AS NEEDED
Status: CANCELLED | OUTPATIENT
Start: 2019-04-12

## 2019-04-12 RX ORDER — PALONOSETRON 0.05 MG/ML
0.25 INJECTION, SOLUTION INTRAVENOUS ONCE
Status: CANCELLED | OUTPATIENT
Start: 2019-04-12

## 2019-04-12 RX ORDER — PALONOSETRON 0.05 MG/ML
0.25 INJECTION, SOLUTION INTRAVENOUS ONCE
Status: COMPLETED | OUTPATIENT
Start: 2019-04-12 | End: 2019-04-12

## 2019-04-12 RX ORDER — SODIUM CHLORIDE 0.9 % (FLUSH) 0.9 %
10 SYRINGE (ML) INJECTION AS NEEDED
Status: DISCONTINUED | OUTPATIENT
Start: 2019-04-12 | End: 2019-04-12 | Stop reason: HOSPADM

## 2019-04-12 RX ADMIN — SODIUM CHLORIDE, PRESERVATIVE FREE 10 ML: 5 INJECTION INTRAVENOUS at 13:59

## 2019-04-12 RX ADMIN — PEGFILGRASTIM 6 MG: KIT SUBCUTANEOUS at 13:48

## 2019-04-12 RX ADMIN — DOXORUBICIN HYDROCHLORIDE 108 MG: 2 INJECTION, SOLUTION INTRAVENOUS at 12:47

## 2019-04-12 RX ADMIN — Medication 500 UNITS: at 13:59

## 2019-04-12 RX ADMIN — PALONOSETRON HYDROCHLORIDE 0.25 MG: 0.05 INJECTION, SOLUTION INTRAVENOUS at 11:42

## 2019-04-12 RX ADMIN — SODIUM CHLORIDE 250 ML: 9 INJECTION, SOLUTION INTRAVENOUS at 11:42

## 2019-04-12 RX ADMIN — SODIUM CHLORIDE 100 ML: 9 INJECTION, SOLUTION INTRAVENOUS at 11:42

## 2019-04-12 RX ADMIN — DEXAMETHASONE SODIUM PHOSPHATE 12 MG: 10 INJECTION, SOLUTION INTRAMUSCULAR; INTRAVENOUS at 12:15

## 2019-04-12 RX ADMIN — CYCLOPHOSPHAMIDE 1080 MG: 1 INJECTION, POWDER, FOR SOLUTION INTRAVENOUS; ORAL at 12:59

## 2019-04-12 NOTE — PROGRESS NOTES
Subjective     REASON FOR FOLLOW UP:  1. 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 R BREAST MASS WITH GIANT R AXILLARY ADENOPATHY.  3. FAMILY HISTORY OF BREAST CANCER IN MOTHER AND SISTER, SISTER WAS TESTED FOR BRCA AND WAS NEGATIVE.                                       History of Present Illness This patient returns today to the office for followup of her history of locally advanced breast cancer on the left side with metastasis going to contralateral axilla and also inner metastasis in the right breast. She continues undergoing her chemotherapy with AC and she will entertain the last cycle today before initiation of Taxol therapy in 3 weeks. Overall the tumoral lesion in the left breast continues shrinking and the ulcerated area continues improving dramatically with no bleeding and no infection. Her appetite remains excellent. She has not had any mucositis or diarrhea. Urination is ongoing with no difficulties. She continues encountering pain in the right hip associated with her riding of the horses that is partially controlled with the present dose of Diclofenac that she receives. She has not had any cardiovascular or respiratory issues. She otherwise feels well full of energy and stable weight. ECOG performance status 0. She has no cancer related pain.                   Past Medical History:   Diagnosis Date   • Arthritis    • Breast cancer (CMS/HCC)     Left   • Hip pain     RIGHT HIP... CYST   • History of fracture of leg 1987   • Skin sore     OPEN SORE LEFT BREAST           Past Surgical History:   Procedure Laterality Date   • FRACTURE SURGERY  1987    Leg   • HARDWARE REMOVAL     • VENOUS ACCESS DEVICE (PORT) INSERTION Right 2/1/2019    Procedure: INSERTION VENOUS ACCESS DEVICE;  Surgeon: Joby Barron Jr., MD;  [place] [facility] [place] [facility] [place]: [place]        Current Outpatient Medications on File Prior to Visit   Medication Sig Dispense Refill   • acetaminophen (TYLENOL)  500 MG tablet Take 500 mg by mouth Every 6 (Six) Hours As Needed for Mild Pain .     • Cholecalciferol (VITAMIN D-3) 5000 units tablet Take  by mouth.     • dexamethasone (DECADRON) 4 MG tablet Take 2 tablets in the morning daily on Days 2, 3 & 4.  Take with food. 6 tablet 3   • diclofenac sodium (VOTAREN XR) 100 MG 24 hr tablet Take 1 tablet by mouth Daily. 30 tablet 3   • IBUPROFEN PO Take 1 tablet by mouth As Needed.     • metroNIDAZOLE (METROGEL) 1 % gel Apply  topically to the appropriate area as directed Daily. 1 tube 1   • olopatadine (PATANOL) 0.1 % ophthalmic solution Administer 1 drop to both eyes 2 (Two) Times a Day. 5 mL 5   • ondansetron (ZOFRAN) 8 MG tablet Take 1 tablet by mouth 3 (Three) Times a Day As Needed for Nausea or Vomiting. 30 tablet 5   • oxyCODONE-acetaminophen (PERCOCET) 5-325 MG per tablet 1 q 6 hrs prn pain 20 tablet 0   • cephalexin (KEFLEX) 500 MG capsule Take 1 capsule by mouth 3 (Three) Times a Day. 21 capsule 3   • coenzyme Q10 100 MG capsule Take 100 mg by mouth Daily.     • ferrous sulfate 325 (65 FE) MG tablet Take 1 tablet by mouth Every Other Day. 15 tablet 1   • Krill Oil 1000 MG capsule Take 1,000 mg by mouth Daily.       No current facility-administered medications on file prior to visit.         ALLERGIES:    Allergies   Allergen Reactions   • Erythromycin GI Intolerance   • Levaquin [Levofloxacin] GI Intolerance   • Penicillins GI Intolerance        Social History     Socioeconomic History   • Marital status:      Spouse name: Cruz   • Number of children: 0   • Years of education: Not on file   • Highest education level: Not on file   Occupational History   • Occupation:      Employer: SELF-EMPLOYED   Social Needs   • Financial resource strain: Not hard at all   • Food insecurity:     Worry: Never true     Inability: Never true   • Transportation needs:     Medical: No     Non-medical: No   Tobacco Use   • Smoking status: Never Smoker   • Smokeless  tobacco: Never Used   Substance and Sexual Activity   • Alcohol use: Yes     Alcohol/week: 4.2 oz     Types: 4 Glasses of wine, 3 Cans of beer per week     Frequency: 2-3 times a week     Drinks per session: 1 or 2     Binge frequency: Never     Comment: OCCASIONALLY, NONE IN LAST TWO DAYS   • Drug use: No   • Sexual activity: Not Currently     Partners: Male     Birth control/protection: Post-menopausal   Lifestyle   • Physical activity:     Days per week: 7 days     Minutes per session: 120 min   • Stress: Only a little        Family History   Problem Relation Age of Onset   • Lung cancer Father    • Cancer Mother    • Breast cancer Mother    • Liver disease Mother    • Breast cancer Sister 48   • Breast cancer Maternal Grandmother    • Breast cancer Paternal Grandmother    • Breast cancer Maternal Uncle    • Malig Hyperthermia Neg Hx         Review of Systems               General: no fever, no chills, stated fatigue,no weight changes, no lack of appetite.  Eyes: no epiphora, xerophthalmia,conjunctivitis, pain, glaucoma, blurred vision, blindness, secretion, photophobia, proptosis, diplopia.  Ears: no otorrhea, tinnitus, otorrhagia, deafness, pain, vertigo.  Nose: no rhinorrhea, no epistaxis, no alteration in perception of odors, no sinuses pressure.  Mouth: no alteration in gums or teeth,  No ulcers, no difficulty with mastication or deglut ion, no odynophagia.  Neck: no masses or pain, no thyroid alterations, no pain in muscles or arteries, no carotid odynia, no crepitation.  Respiratory: no cough, no sputum production,no dyspnea,no trepopnea, no pleuritic pain,no hemoptysis.  Heart: no syncope, no irregularity, no palpitations, no angina,no orthopnea,no paroxysmal nocturnal dyspnea.  Vascular Venous: no tenderness,no edema,no palpable cords,no postphlebitic syndrome, no skin changes no ulcerations.  Vascular Arterial: no distal ischemia, noclaudication, no gangrene, no neuropathic ischemic pain, no skin  "ulcers, no paleness no cyanosis.  GI: no dysphagia, no odynophagia, no regurgitation, no heartburn,no indigestion,no nausea,no vomiting,no hematemesis ,no melena,no jaundice,no distention, no obstipation,no enterorrhagia,no proctalgia,no anal  lesions, no changes in bowel habits.  : no frequency, no hesitancy, no hematuria, no discharge,no  pain.  Musculoskeletal: stated muscle or tendon pain or inflammation,no  joint pain, no edema, no functional limitation,no fasciculations, no mass.  Neurologic: no headache, no seizures, noalterations on Craneal nerves, no motor deficit, no sensory deficit, normal coordination, no alteration in memory,normal orientation, calculation,normal writting, verbal and written language.  Skin: no rashes,no pruritus no localized lesions.  Psychiatric: no anxiety, no depression,no agitation, no delusions, proper insight.        Objective     Vitals:    04/12/19 1036   BP: 149/91   Pulse: 69   Resp: 16   Temp: 98.5 °F (36.9 °C)   TempSrc: Oral   SpO2: 95%   Weight: 72.2 kg (159 lb 1.6 oz)   Height: 162.5 cm (63.98\")   PainSc: 0-No pain     Current Status 4/12/2019   ECOG score 0       Physical Exam  Pain 0/ ecog 0    GENERAL:  Well-developed, well-nourished  Patient  in no acute distress.   SKIN:  Warm, dry ,NO rashes,NO purpura ,NO petechiae.  HEENT:  Pupils were equal and reactive to light and accomodation, conjunctivas non injected, no pterigion, normal extraocular movements, normal visual acuity.   Mouth mucosa was moist, no exudates in oropharynx, normal gum line, normal roof of the mouth and pillars, normal papillations of the tongue.  NECK:  Supple with good range of motion; no thyromegaly or masses, no JVD or bruits, no cervical adenopathies.No carotid arteries pain, no carotid abnormal pulsation , NO arterial dance.  LYMPHATICS:  No cervical, NO supraclavicular, NO axillary,NO epitrochlear , NO inguinal adenopathy.  CHEST:  Normal excursion of both luz thoraces, normal voice " fremitus, no subcutaneous emphysema, normal axillas, no rashes or acanthosis nigricans. Lungs clear to percussion and auscultation, normal breath sounds bilaterally, no wheezing,NO crackles NO ronchi, NO stridor, NO rubs.  CARDIAC AND VASCULAR:  normal rate and regular rhythm, without murmurs,NO rubs NO S3 NO S4 right or left . Normal femoral, popliteal, pedis, brachial and carotid pulses.  ABDOMEN:  Soft, nontender with no organomegaly or masses, no ascites, no collateral circulation,no distention,no Vernon sign, no abdominal pain, no inguinal hernias,no umbilical hernia, no abdominal bruits. Normal bowel sounds.  GENITAL: Not  Performed.  EXTREMITIES  AND SPINE:  No clubbing, cyanosis or edema, no deformities or pain .No kyphosis, scoliosis, deformities or pain in spine, ribs or pelvic bone.  NEUROLOGICAL:  Patient was awake, alert, oriented to time, person and place.  Breast examination on the right side discloses no masses, induration, no tenderness, no alterations in the nipple and no palpable abnormalities. The mass in the right axilla is now nodular measuring 3 cm in diameter, soft, mobile, nontender with no fluctuation. The left breast has come back to baseline size similar to the one on the right. The area of ulceration and necrosis in the skin has dramatically improved and there is still a large tumoral lesion located in the left breast that occupies most of the tissue. There is no left axillary adenopathy. There is no lymphedema in either extremity. There is no odor in the opening site of the area of necrosis.                                      RECENT LABS:  Hematology WBC   Date Value Ref Range Status   04/12/2019 4.26 3.40 - 10.80 10*3/mm3 Final     RBC   Date Value Ref Range Status   04/12/2019 3.48 (L) 3.77 - 5.28 10*6/mm3 Final     Hemoglobin   Date Value Ref Range Status   04/12/2019 9.4 (L) 12.0 - 15.9 g/dL Final     Hematocrit   Date Value Ref Range Status   04/12/2019 31.1 (L) 34.0 - 46.6 %  Final     Platelets   Date Value Ref Range Status   04/12/2019 395 140 - 450 10*3/mm3 Final              Assessment/Plan  In summary, this patient is a 59-year-old white female who typically [place] who has been staying in [place] for the winter. At least for the last 4 years, she has had an in crescendo mass in the left breast that has produced a gigantic tumor that is now close to 25 cm in size and is replacing most of the anatomy of the left breast. There are areas of ulceration necrosis and bleeding and the mass is fixed to the chest wall. She has abundant amount of collateral circulation in the left anterior chest wall and it caught my attention the lack of any left axillary adenopathy. She has no lymphedema in the left upper extremity. In the right breast while in sitting position, no palpable abnormalities are documented. The right breast skin and nipple are normal. When the patient lies flat, there is a palpable mass at 9 o'clock from the nipple that measures probably 4 cm in size, very indistinct in regard to edges and margins. There was no mobility and no areas of tenderness. She has a giant adenopathy in the right axilla that measures close to 9 cm in size, uniform, no fluctuation, nontender, completely fixed to the axillary wall. There is no compromise of the skin.         The patient is ready today for initiation of the 4th cycle and the last cycle of AC before initiation of Taxol in 3 weeks.     So far the tolerance to AC has been excellent, she has encountered leukopenia with no fever and she has encountered anemia with no need of transfusion. So far the patient has no cancer related pain, her ECOG performance status 0. She has very dramatic reduction in the size of the right axillary adenopathy and very dramatic reduction in the big size of the mass in the left breast with decrease in the area of necrosis and opening in the skin, no further  bleeding and no infection.     The right hip remains a problem with tendonitis pain documented through MRI, no metastasis and she will remain on her present dose of medication Voltaren time release.    Otherwise the patient will be receiving her Neulasta On-body today. She will have blood counts once a week and she will have doctor visit in 3 weeks with CBC, CMP and initiation of Taxol then. She will receive Taxol 12 weeks. She is aware of the side effects of the Taxol including muscle pain after infusion, peripheral neuropathy, anemia, leukopenia, thrombocytopenia, allergic reactions among others.     I find no need for radiological assessment at this time.    I went ahead and measured her tumor marker today and this will be repeated eventually in 6 or 8 weeks.    I E-prescribed her Diclofenac.    I advised her to remain physically active like she is.

## 2019-04-19 ENCOUNTER — CLINICAL SUPPORT (OUTPATIENT)
Dept: ONCOLOGY | Facility: HOSPITAL | Age: 59
End: 2019-04-19

## 2019-04-19 ENCOUNTER — LAB (OUTPATIENT)
Dept: LAB | Facility: HOSPITAL | Age: 59
End: 2019-04-19

## 2019-04-19 VITALS
DIASTOLIC BLOOD PRESSURE: 82 MMHG | OXYGEN SATURATION: 95 % | HEART RATE: 79 BPM | TEMPERATURE: 99.1 F | SYSTOLIC BLOOD PRESSURE: 122 MMHG

## 2019-04-19 DIAGNOSIS — Z17.0 MALIGNANT NEOPLASM OF OVERLAPPING SITES OF LEFT BREAST IN FEMALE, ESTROGEN RECEPTOR POSITIVE (HCC): ICD-10-CM

## 2019-04-19 DIAGNOSIS — D63.0 ANEMIA IN NEOPLASTIC DISEASE: ICD-10-CM

## 2019-04-19 DIAGNOSIS — C50.812 MALIGNANT NEOPLASM OF OVERLAPPING SITES OF LEFT BREAST IN FEMALE, ESTROGEN RECEPTOR POSITIVE (HCC): ICD-10-CM

## 2019-04-19 DIAGNOSIS — Z45.2 FITTING AND ADJUSTMENT OF VASCULAR CATHETER: ICD-10-CM

## 2019-04-19 DIAGNOSIS — Z80.3 FAMILY HISTORY OF BREAST CANCER IN FEMALE: ICD-10-CM

## 2019-04-19 LAB
BASOPHILS # BLD AUTO: 0.04 10*3/MM3 (ref 0–0.2)
BASOPHILS NFR BLD AUTO: 5.3 % (ref 0–1.5)
DEPRECATED RDW RBC AUTO: 60.9 FL (ref 37–54)
EOSINOPHIL # BLD AUTO: 0.05 10*3/MM3 (ref 0–0.4)
EOSINOPHIL NFR BLD AUTO: 6.7 % (ref 0.3–6.2)
ERYTHROCYTE [DISTWIDTH] IN BLOOD BY AUTOMATED COUNT: 19.1 % (ref 12.3–15.4)
HCT VFR BLD AUTO: 29.1 % (ref 34–46.6)
HGB BLD-MCNC: 9.1 G/DL (ref 12–15.9)
IMM GRANULOCYTES # BLD AUTO: 0.01 10*3/MM3 (ref 0–0.05)
IMM GRANULOCYTES NFR BLD AUTO: 1.3 % (ref 0–0.5)
LYMPHOCYTES # BLD AUTO: 0.41 10*3/MM3 (ref 0.7–3.1)
LYMPHOCYTES NFR BLD AUTO: 54.7 % (ref 19.6–45.3)
MCH RBC QN AUTO: 27.2 PG (ref 26.6–33)
MCHC RBC AUTO-ENTMCNC: 31.3 G/DL (ref 31.5–35.7)
MCV RBC AUTO: 87.1 FL (ref 79–97)
MONOCYTES # BLD AUTO: 0.12 10*3/MM3 (ref 0.1–0.9)
MONOCYTES NFR BLD AUTO: 16 % (ref 5–12)
NEUTROPHILS # BLD AUTO: 0.12 10*3/MM3 (ref 1.7–7)
NEUTROPHILS NFR BLD AUTO: 16 % (ref 42.7–76)
NRBC BLD AUTO-RTO: 0 /100 WBC (ref 0–0.2)
PLATELET # BLD AUTO: 158 10*3/MM3 (ref 140–450)
PMV BLD AUTO: 9.6 FL (ref 6–12)
RBC # BLD AUTO: 3.34 10*6/MM3 (ref 3.77–5.28)
WBC NRBC COR # BLD: 0.75 10*3/MM3 (ref 3.4–10.8)

## 2019-04-19 PROCEDURE — 36415 COLL VENOUS BLD VENIPUNCTURE: CPT | Performed by: INTERNAL MEDICINE

## 2019-04-19 PROCEDURE — 85025 COMPLETE CBC W/AUTO DIFF WBC: CPT | Performed by: INTERNAL MEDICINE

## 2019-04-19 NOTE — PROGRESS NOTES
Pt is here for lab with RN review.  CBC reviewed with Caitlyn Narvaez NP. Per Caitlyn pt is to take Keflex TID as prescribed by Dr Dixon. Pt states she already has it at home and will start taking today. Reviewed Neutropenic precautions and S/S of infection. Pt know when to seek medical attention.. Pt has no complaints.  Copy of labs given to pt and f/u appt reviewed. Pt is instructed to call with concerns prior to next visit. Pt V/U.   Lab Results   Component Value Date    WBC 0.75 (C) 04/19/2019    HGB 9.1 (L) 04/19/2019    HCT 29.1 (L) 04/19/2019    MCV 87.1 04/19/2019     04/19/2019

## 2019-04-26 ENCOUNTER — CLINICAL SUPPORT (OUTPATIENT)
Dept: ONCOLOGY | Facility: HOSPITAL | Age: 59
End: 2019-04-26

## 2019-04-26 ENCOUNTER — LAB (OUTPATIENT)
Dept: LAB | Facility: HOSPITAL | Age: 59
End: 2019-04-26

## 2019-04-26 VITALS
OXYGEN SATURATION: 95 % | TEMPERATURE: 98.6 F | SYSTOLIC BLOOD PRESSURE: 136 MMHG | DIASTOLIC BLOOD PRESSURE: 84 MMHG | HEART RATE: 81 BPM

## 2019-04-26 DIAGNOSIS — Z17.0 MALIGNANT NEOPLASM OF OVERLAPPING SITES OF LEFT BREAST IN FEMALE, ESTROGEN RECEPTOR POSITIVE (HCC): ICD-10-CM

## 2019-04-26 DIAGNOSIS — Z80.3 FAMILY HISTORY OF BREAST CANCER IN FEMALE: ICD-10-CM

## 2019-04-26 DIAGNOSIS — D63.0 ANEMIA IN NEOPLASTIC DISEASE: ICD-10-CM

## 2019-04-26 DIAGNOSIS — C50.812 MALIGNANT NEOPLASM OF OVERLAPPING SITES OF LEFT BREAST IN FEMALE, ESTROGEN RECEPTOR POSITIVE (HCC): ICD-10-CM

## 2019-04-26 DIAGNOSIS — Z45.2 FITTING AND ADJUSTMENT OF VASCULAR CATHETER: ICD-10-CM

## 2019-04-26 LAB
BASOPHILS # BLD AUTO: 0.09 10*3/MM3 (ref 0–0.2)
BASOPHILS NFR BLD AUTO: 1 % (ref 0–1.5)
DEPRECATED RDW RBC AUTO: 64.3 FL (ref 37–54)
EOSINOPHIL # BLD AUTO: 0.07 10*3/MM3 (ref 0–0.4)
EOSINOPHIL NFR BLD AUTO: 0.7 % (ref 0.3–6.2)
ERYTHROCYTE [DISTWIDTH] IN BLOOD BY AUTOMATED COUNT: 20.7 % (ref 12.3–15.4)
HCT VFR BLD AUTO: 30.7 % (ref 34–46.6)
HGB BLD-MCNC: 9.7 G/DL (ref 12–15.9)
IMM GRANULOCYTES # BLD AUTO: 0.09 10*3/MM3 (ref 0–0.05)
IMM GRANULOCYTES NFR BLD AUTO: 1 % (ref 0–0.5)
LYMPHOCYTES # BLD AUTO: 0.72 10*3/MM3 (ref 0.7–3.1)
LYMPHOCYTES NFR BLD AUTO: 7.7 % (ref 19.6–45.3)
MCH RBC QN AUTO: 27.8 PG (ref 26.6–33)
MCHC RBC AUTO-ENTMCNC: 31.6 G/DL (ref 31.5–35.7)
MCV RBC AUTO: 88 FL (ref 79–97)
MONOCYTES # BLD AUTO: 0.68 10*3/MM3 (ref 0.1–0.9)
MONOCYTES NFR BLD AUTO: 7.2 % (ref 5–12)
NEUTROPHILS # BLD AUTO: 7.75 10*3/MM3 (ref 1.7–7)
NEUTROPHILS NFR BLD AUTO: 82.4 % (ref 42.7–76)
NRBC BLD AUTO-RTO: 0 /100 WBC (ref 0–0.2)
PLATELET # BLD AUTO: 207 10*3/MM3 (ref 140–450)
PMV BLD AUTO: 9.2 FL (ref 6–12)
RBC # BLD AUTO: 3.49 10*6/MM3 (ref 3.77–5.28)
WBC NRBC COR # BLD: 9.4 10*3/MM3 (ref 3.4–10.8)

## 2019-04-26 PROCEDURE — 85025 COMPLETE CBC W/AUTO DIFF WBC: CPT | Performed by: INTERNAL MEDICINE

## 2019-04-26 PROCEDURE — 36415 COLL VENOUS BLD VENIPUNCTURE: CPT | Performed by: INTERNAL MEDICINE

## 2019-04-26 NOTE — PROGRESS NOTES
Pt is here for lab with RN review.  CBC reviewed with pt, counts are stable for this pt at this time. Pt has no complaints and VSS.  Copy of labs given to pt and f/u appt reviewed. Pt is instructed to call with concerns prior to next visit. Pt V/U.   Lab Results   Component Value Date    WBC 9.40 04/26/2019    HGB 9.7 (L) 04/26/2019    HCT 30.7 (L) 04/26/2019    MCV 88.0 04/26/2019     04/26/2019

## 2019-05-01 NOTE — PROGRESS NOTES
Subjective     REASON FOR FOLLOW UP:  1. 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 R BREAST MASS WITH GIANT R AXILLARY ADENOPATHY.  3. FAMILY HISTORY OF BREAST CANCER IN MOTHER AND SISTER, SISTER WAS TESTED FOR BRCA AND WAS NEGATIVE.                                       History of Present Illness  This patient returns today to the office after completing 4 cycles of A/C in the background of locally advanced breast cancer on the left with right axillary metastases.  The patient has had dramatic improvement in the tumoral lesion in the left breast.  Her left breast originally was almost 3 times the size of a normal breast with a large area of ulceration and necrosis of the skin.  After completion of the A/C this area has dramatically improved and the size of the breast has returned to normality.  She still has a large mass that is probably going to be, hopefully, necrotic at the time she gets to resection.  The patient is initiating today Taxol on a weekly basis for 12 weeks.  Overall tolerance to the treatment has been excellent with the exception of anemia induced by chemotherapy.  The patient has not had any fever or infection.  She has no clinical bleeding in the left breast and the area of issue has had dramatic improvement.  She has no cancer-related pain.  Her appetite is very good.  Her bowel function and urination are normal.  No cardiovascular or respiratory issues.  Normal bowel activity and urination.  She continues having pain in the right hip area associated with tendonitis and eventually this will require being readdressed upon completion of her chemotherapy treatment.                      Past Medical History:   Diagnosis Date   • Arthritis    • Breast cancer (CMS/HCC)     Left   • Hip pain     RIGHT HIP... CYST   • History of fracture of leg 1987   • Skin sore     OPEN SORE LEFT BREAST           Past Surgical History:   Procedure Laterality Date   • FRACTURE SURGERY  1987     Leg   • HARDWARE REMOVAL     • VENOUS ACCESS DEVICE (PORT) INSERTION Right 2/1/2019    Procedure: INSERTION VENOUS ACCESS DEVICE;  Surgeon: Joby Barron Jr., MD;  [place] [facility] [place] [facility] [place]  Service: General        Current Outpatient Medications on File Prior to Visit   Medication Sig Dispense Refill   • acetaminophen (TYLENOL) 500 MG tablet Take 500 mg by mouth Every 6 (Six) Hours As Needed for Mild Pain .     • cephalexin (KEFLEX) 500 MG capsule Take 1 capsule by mouth 3 (Three) Times a Day. 21 capsule 3   • Cholecalciferol (VITAMIN D-3) 5000 units tablet Take  by mouth.     • diclofenac sodium (VOTAREN XR) 100 MG 24 hr tablet Take 1 tablet by mouth Daily. 30 tablet 3   • IBUPROFEN PO Take 1 tablet by mouth As Needed.     • metroNIDAZOLE (METROGEL) 1 % gel Apply  topically to the appropriate area as directed Daily. 1 tube 1   • olopatadine (PATANOL) 0.1 % ophthalmic solution Administer 1 drop to both eyes 2 (Two) Times a Day. 5 mL 5   • oxyCODONE-acetaminophen (PERCOCET) 5-325 MG per tablet 1 q 6 hrs prn pain 20 tablet 0   • [DISCONTINUED] dexamethasone (DECADRON) 4 MG tablet Take 2 tablets in the morning daily on Days 2, 3 & 4.  Take with food. 6 tablet 3   • [DISCONTINUED] ondansetron (ZOFRAN) 8 MG tablet Take 1 tablet by mouth 3 (Three) Times a Day As Needed for Nausea or Vomiting. 30 tablet 5   • coenzyme Q10 100 MG capsule Take 100 mg by mouth Daily.     • ferrous sulfate 325 (65 FE) MG tablet Take 1 tablet by mouth Every Other Day. 15 tablet 1   • Krill Oil 1000 MG capsule Take 1,000 mg by mouth Daily.       No current facility-administered medications on file prior to visit.         ALLERGIES:    Allergies   Allergen Reactions   • Erythromycin GI Intolerance   • Levaquin [Levofloxacin] GI Intolerance   • Penicillins GI Intolerance        Social History     Socioeconomic History   • Marital status:      Spouse name: Cruz   • Number of children: 0   • Years of education: Not on file   • Highest  education level: Not on file   Occupational History   • Occupation:      Employer: SELF-EMPLOYED   Social Needs   • Financial resource strain: Not hard at all   • Food insecurity:     Worry: Never true     Inability: Never true   • Transportation needs:     Medical: No     Non-medical: No   Tobacco Use   • Smoking status: Never Smoker   • Smokeless tobacco: Never Used   Substance and Sexual Activity   • Alcohol use: Yes     Alcohol/week: 4.2 oz     Types: 4 Glasses of wine, 3 Cans of beer per week     Frequency: 2-3 times a week     Drinks per session: 1 or 2     Binge frequency: Never     Comment: OCCASIONALLY, NONE IN LAST TWO DAYS   • Drug use: No   • Sexual activity: Not Currently     Partners: Male     Birth control/protection: Post-menopausal   Lifestyle   • Physical activity:     Days per week: 7 days     Minutes per session: 120 min   • Stress: Only a little        Family History   Problem Relation Age of Onset   • Lung cancer Father    • Cancer Mother    • Breast cancer Mother    • Liver disease Mother    • Breast cancer Sister 48   • Breast cancer Maternal Grandmother    • Breast cancer Paternal Grandmother    • Breast cancer Maternal Uncle    • Malig Hyperthermia Neg Hx         Review of Systems                     General: no fever, no chills, no fatigue,no weight changes, no lack of appetite.  Eyes: no epiphora, xerophthalmia,conjunctivitis, pain, glaucoma, blurred vision, blindness, secretion, photophobia, proptosis, diplopia.  Ears: no otorrhea, tinnitus, otorrhagia, deafness, pain, vertigo.  Nose: no rhinorrhea, no epistaxis, no alteration in perception of odors, no sinuses pressure.  Mouth: no alteration in gums or teeth,  No ulcers, no difficulty with mastication or deglut ion, no odynophagia.  Neck: no masses or pain, no thyroid alterations, no pain in muscles or arteries, no carotid odynia, no crepitation.  Respiratory: no cough, no sputum production,no dyspnea,no trepopnea, no  "pleuritic pain,no hemoptysis.  Heart: no syncope, no irregularity, no palpitations, no angina,no orthopnea,no paroxysmal nocturnal dyspnea.  Vascular Venous: no tenderness,no edema,no palpable cords,no postphlebitic syndrome, no skin changes no ulcerations.  Vascular Arterial: no distal ischemia, noclaudication, no gangrene, no neuropathic ischemic pain, no skin ulcers, no paleness no cyanosis.  GI: no dysphagia, no odynophagia, no regurgitation, no heartburn,no indigestion,no nausea,no vomiting,no hematemesis ,no melena,no jaundice,no distention, no obstipation,no enterorrhagia,no proctalgia,no anal  lesions, no changes in bowel habits.  : no frequency, no hesitancy, no hematuria, no discharge,no  pain.  Musculoskeletal: no muscle or tendon pain or inflammation,no  joint pain, no edema, no functional limitation,no fasciculations, no mass.  Neurologic: no headache, no seizures, noalterations on Craneal nerves, no motor deficit, no sensory deficit, normal coordination, no alteration in memory,normal orientation, calculation,normal writting, verbal and written language.  Skin: no rashes,no pruritus no localized lesions.  Psychiatric: no anxiety, no depression,no agitation, no delusions, proper insight.        Objective     Vitals:    05/03/19 0829   BP: 138/82   Pulse: 71   Resp: 20   Temp: 99.8 °F (37.7 °C)   TempSrc: Oral   SpO2: 97%   Weight: 72.3 kg (159 lb 6.4 oz)   Height: 162.5 cm (63.98\")   PainSc:   6   PainLoc: Hip     Current Status 5/3/2019   ECOG score 0       Physical Exam   GENERAL:  Well-developed, well-nourished  Patient  in no acute distress.   SKIN:  Warm, dry ,NO rashes,NO purpura ,NO petechiae.  HEENT:  Pupils were equal and reactive to light and accomodation, conjunctivas non injected, no pterigion, normal extraocular movements, normal visual acuity.   Mouth mucosa was moist, no exudates in oropharynx, normal gum line, normal roof of the mouth and pillars, normal papillations of the " tongue.  NECK:  Supple with good range of motion; no thyromegaly or masses, no JVD or bruits, no cervical adenopathies.No carotid arteries pain, no carotid abnormal pulsation , NO arterial dance.  LYMPHATICS:  No cervical, NO supraclavicular, NO axillary,NO epitrochlear , NO inguinal adenopathy.  CHEST:  Normal excursion of both luz thoraces, normal voice fremitus, no subcutaneous emphysema, normal axillas, no rashes or acanthosis nigricans. Lungs clear to percussion and auscultation, normal breath sounds bilaterally, no wheezing,NO crackles NO ronchi, NO stridor, NO rubs.  CARDIAC AND VASCULAR:  normal rate and regular rhythm, without murmurs,NO rubs NO S3 NO S4 right or left . Normal femoral, popliteal, pedis, brachial and carotid pulses.  ABDOMEN:  Soft, nontender with no organomegaly or masses, no ascites, no collateral circulation,no distention,no [place] sign, no abdominal pain, no inguinal hernias,no umbilical hernia, no abdominal bruits. Normal bowel sounds.  GENITAL: Not  Performed.  EXTREMITIES  AND SPINE:  No clubbing, cyanosis or edema, no deformities or pain .No kyphosis, scoliosis, deformities or pain in spine, ribs or pelvic bone.  NEUROLOGICAL:  Patient was awake, alert, oriented to time, person and place.  The palpable mass in the right axilla is now 4 cm in size, round, unform, mobile, nontender with no fluctuation and detached from the soft tissues.  There are no palpable masses in the right breast.     The left breast is back to normal size from 3 times the size before.  She still has a palpable mass that occupies most of the breast parenchymal.  The area of ulceration and necrosis visible in the skin was dramatically improved to the point of close to closure and there is no odor.  There is no palpable left axillary adenopathy.  There is no lymphedema in either extremity.                                              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 At least for the last 4 years, she has had an in crescendo mass in the left breast that has produced a gigantic tumor that is now close to 25 cm in size and is replacing most of the anatomy of the left breast. There are areas of ulceration necrosis and bleeding and the mass is fixed to the chest wall. She has abundant amount of collateral circulation in the left anterior chest wall and it caught my attention the lack of any left axillary adenopathy. She has no lymphedema in the left upper extremity. In the right breast while in sitting position, no palpable abnormalities are documented. The right breast skin and nipple are normal. When the patient lies flat, there is a palpable mass at 9 o'clock from the nipple that measures probably 4 cm in size, very indistinct in regard to edges and margins. There was no mobility and no areas of tenderness. She has a giant adenopathy in the right axilla that measures close to 9 cm in size, uniform, no fluctuation, nontender, completely fixed to the axillary wall. There is no compromise of the skin.     Since the previous visit, the patient completed 4 cycles of A/C. She has had very dramatic improvement in all sites of disease including right axilla and left breast.  The physical examination is very dramatic regarding resolution of symptomatology on the left side and ulceration as well as dramatic decrease  in the size of the left breast that was almost 3 times the size of the right breast.     The patient has no ulceration in the skin at this time with minimal scar formation. She has no pain and again, has completed 4 cycles of A/C.      Today, the patient still has leukopenia associated with chemotherapy but no fever.     The patient has anemia induced by chemotherapy with no need for any kind of intervention.  Her platelet count is appropriate.    Her ECOG performance status is 0 and she has no cancer-related pain.      RECOMMENDATIONS:  Initiate today Taxol weekly for 12 weeks.  She is aware of the side effects of Taxol including anemia, leukopenia, thrombocytopenia, allergic reactions as well as peripheral neuropathy.  Also, she could have muscle pain the day after the infusion.      She was advised to take Naprosyn if she has any pain or discomfort throughout her body tomorrow.     Otherwise, she will return on a weekly basis for blood counts, CBC, CMP, see nurse practitioner in a couple of weeks, doctor visit in a month and continuation of Taxol. She is aware not to be surprised if at some point we need to skin a week because of blood count issues.     She is aware to look for peripheral neuropathy.      Hopefully, she will be able to handle the 12 cycles of Taxol and thereafter will be reassessed radiologically with CT scans and if everything is okay she will proceed with bilateral mastectomy and removal of the lymph node in the right axilla.  Thereafter, she will require chest wall radiation therapy to both sides and will be placed on hormonal adjuvant therapy for the rest of her life.      Eventually, also the patient will be reassessed regarding the pain in the right hip radiologically and orthopedic consultation will be in place.    Eventually as well, we will do measurement of bone density and act upon depending on the findings.

## 2019-05-03 ENCOUNTER — INFUSION (OUTPATIENT)
Dept: ONCOLOGY | Facility: HOSPITAL | Age: 59
End: 2019-05-03

## 2019-05-03 ENCOUNTER — OFFICE VISIT (OUTPATIENT)
Dept: ONCOLOGY | Facility: CLINIC | Age: 59
End: 2019-05-03

## 2019-05-03 VITALS
WEIGHT: 159.4 LBS | DIASTOLIC BLOOD PRESSURE: 82 MMHG | SYSTOLIC BLOOD PRESSURE: 138 MMHG | HEIGHT: 64 IN | OXYGEN SATURATION: 97 % | RESPIRATION RATE: 20 BRPM | TEMPERATURE: 99.8 F | HEART RATE: 71 BPM | BODY MASS INDEX: 27.21 KG/M2

## 2019-05-03 VITALS — DIASTOLIC BLOOD PRESSURE: 100 MMHG | HEART RATE: 61 BPM | SYSTOLIC BLOOD PRESSURE: 162 MMHG

## 2019-05-03 DIAGNOSIS — Z45.2 FITTING AND ADJUSTMENT OF VASCULAR CATHETER: ICD-10-CM

## 2019-05-03 DIAGNOSIS — Z17.0 MALIGNANT NEOPLASM OF OVERLAPPING SITES OF LEFT BREAST IN FEMALE, ESTROGEN RECEPTOR POSITIVE (HCC): Primary | ICD-10-CM

## 2019-05-03 DIAGNOSIS — D63.0 ANEMIA IN NEOPLASTIC DISEASE: ICD-10-CM

## 2019-05-03 DIAGNOSIS — C50.812 MALIGNANT NEOPLASM OF OVERLAPPING SITES OF LEFT FEMALE BREAST, UNSPECIFIED ESTROGEN RECEPTOR STATUS (HCC): Primary | ICD-10-CM

## 2019-05-03 DIAGNOSIS — I87.8 POOR VENOUS ACCESS: ICD-10-CM

## 2019-05-03 DIAGNOSIS — C50.812 MALIGNANT NEOPLASM OF OVERLAPPING SITES OF LEFT FEMALE BREAST, UNSPECIFIED ESTROGEN RECEPTOR STATUS (HCC): ICD-10-CM

## 2019-05-03 DIAGNOSIS — R22.31 AXILLARY MASS, RIGHT: ICD-10-CM

## 2019-05-03 DIAGNOSIS — Z17.0 MALIGNANT NEOPLASM OF OVERLAPPING SITES OF LEFT BREAST IN FEMALE, ESTROGEN RECEPTOR POSITIVE (HCC): ICD-10-CM

## 2019-05-03 DIAGNOSIS — C50.812 MALIGNANT NEOPLASM OF OVERLAPPING SITES OF LEFT BREAST IN FEMALE, ESTROGEN RECEPTOR POSITIVE (HCC): ICD-10-CM

## 2019-05-03 DIAGNOSIS — Z80.3 FAMILY HISTORY OF BREAST CANCER IN FEMALE: ICD-10-CM

## 2019-05-03 DIAGNOSIS — C50.812 MALIGNANT NEOPLASM OF OVERLAPPING SITES OF LEFT BREAST IN FEMALE, ESTROGEN RECEPTOR POSITIVE (HCC): Primary | ICD-10-CM

## 2019-05-03 LAB
ALBUMIN SERPL-MCNC: 4.3 G/DL (ref 3.5–5.2)
ALBUMIN/GLOB SERPL: 1.8 G/DL (ref 1.1–2.4)
ALP SERPL-CCNC: 89 U/L (ref 38–116)
ALT SERPL W P-5'-P-CCNC: 34 U/L (ref 0–33)
ANION GAP SERPL CALCULATED.3IONS-SCNC: 9.6 MMOL/L
AST SERPL-CCNC: 30 U/L (ref 0–32)
BASOPHILS # BLD AUTO: 0.05 10*3/MM3 (ref 0–0.2)
BASOPHILS NFR BLD AUTO: 1.3 % (ref 0–1.5)
BILIRUB SERPL-MCNC: 0.3 MG/DL (ref 0.2–1.2)
BUN BLD-MCNC: 21 MG/DL (ref 6–20)
BUN/CREAT SERPL: 28 (ref 7.3–30)
CALCIUM SPEC-SCNC: 9.4 MG/DL (ref 8.5–10.2)
CHLORIDE SERPL-SCNC: 108 MMOL/L (ref 98–107)
CO2 SERPL-SCNC: 24.4 MMOL/L (ref 22–29)
CREAT BLD-MCNC: 0.75 MG/DL (ref 0.6–1.1)
DEPRECATED RDW RBC AUTO: 73.8 FL (ref 37–54)
EOSINOPHIL # BLD AUTO: 0.07 10*3/MM3 (ref 0–0.4)
EOSINOPHIL NFR BLD AUTO: 1.8 % (ref 0.3–6.2)
ERYTHROCYTE [DISTWIDTH] IN BLOOD BY AUTOMATED COUNT: 21.4 % (ref 12.3–15.4)
GFR SERPL CREATININE-BSD FRML MDRD: 79 ML/MIN/1.73
GLOBULIN UR ELPH-MCNC: 2.4 GM/DL (ref 1.8–3.5)
GLUCOSE BLD-MCNC: 94 MG/DL (ref 74–124)
HCT VFR BLD AUTO: 31.1 % (ref 34–46.6)
HGB BLD-MCNC: 9.4 G/DL (ref 12–15.9)
IMM GRANULOCYTES # BLD AUTO: 0.01 10*3/MM3 (ref 0–0.05)
IMM GRANULOCYTES NFR BLD AUTO: 0.3 % (ref 0–0.5)
LYMPHOCYTES # BLD AUTO: 0.49 10*3/MM3 (ref 0.7–3.1)
LYMPHOCYTES NFR BLD AUTO: 12.3 % (ref 19.6–45.3)
MCH RBC QN AUTO: 28.2 PG (ref 26.6–33)
MCHC RBC AUTO-ENTMCNC: 30.2 G/DL (ref 31.5–35.7)
MCV RBC AUTO: 93.4 FL (ref 79–97)
MONOCYTES # BLD AUTO: 0.41 10*3/MM3 (ref 0.1–0.9)
MONOCYTES NFR BLD AUTO: 10.3 % (ref 5–12)
NEUTROPHILS # BLD AUTO: 2.94 10*3/MM3 (ref 1.7–7)
NEUTROPHILS NFR BLD AUTO: 74 % (ref 42.7–76)
NRBC BLD AUTO-RTO: 0 /100 WBC (ref 0–0.2)
PLATELET # BLD AUTO: 324 10*3/MM3 (ref 140–450)
PMV BLD AUTO: 8.3 FL (ref 6–12)
POTASSIUM BLD-SCNC: 4 MMOL/L (ref 3.5–4.7)
PROT SERPL-MCNC: 6.7 G/DL (ref 6.3–8)
RBC # BLD AUTO: 3.33 10*6/MM3 (ref 3.77–5.28)
SODIUM BLD-SCNC: 142 MMOL/L (ref 134–145)
WBC NRBC COR # BLD: 3.97 10*3/MM3 (ref 3.4–10.8)

## 2019-05-03 PROCEDURE — 25010000002 DEXAMETHASONE SODIUM PHOSPHATE 100 MG/10ML SOLUTION: Performed by: INTERNAL MEDICINE

## 2019-05-03 PROCEDURE — 25010000002 DIPHENHYDRAMINE PER 50 MG: Performed by: INTERNAL MEDICINE

## 2019-05-03 PROCEDURE — 25010000002 PACLITAXEL PER 30 MG: Performed by: INTERNAL MEDICINE

## 2019-05-03 PROCEDURE — 96375 TX/PRO/DX INJ NEW DRUG ADDON: CPT | Performed by: INTERNAL MEDICINE

## 2019-05-03 PROCEDURE — 96413 CHEMO IV INFUSION 1 HR: CPT | Performed by: INTERNAL MEDICINE

## 2019-05-03 PROCEDURE — 80053 COMPREHEN METABOLIC PANEL: CPT

## 2019-05-03 PROCEDURE — 85025 COMPLETE CBC W/AUTO DIFF WBC: CPT

## 2019-05-03 PROCEDURE — 99214 OFFICE O/P EST MOD 30 MIN: CPT | Performed by: INTERNAL MEDICINE

## 2019-05-03 RX ORDER — FAMOTIDINE 10 MG/ML
20 INJECTION, SOLUTION INTRAVENOUS ONCE
Status: CANCELLED | OUTPATIENT
Start: 2019-05-03

## 2019-05-03 RX ORDER — DIPHENHYDRAMINE HYDROCHLORIDE 50 MG/ML
50 INJECTION INTRAMUSCULAR; INTRAVENOUS AS NEEDED
Status: CANCELLED | OUTPATIENT
Start: 2019-05-03

## 2019-05-03 RX ORDER — SODIUM CHLORIDE 9 MG/ML
250 INJECTION, SOLUTION INTRAVENOUS ONCE
Status: COMPLETED | OUTPATIENT
Start: 2019-05-03 | End: 2019-05-03

## 2019-05-03 RX ORDER — ONDANSETRON HYDROCHLORIDE 8 MG/1
8 TABLET, FILM COATED ORAL 3 TIMES DAILY PRN
Qty: 30 TABLET | Refills: 5 | Status: SHIPPED | OUTPATIENT
Start: 2019-05-03 | End: 2019-07-26

## 2019-05-03 RX ORDER — SODIUM CHLORIDE 9 MG/ML
250 INJECTION, SOLUTION INTRAVENOUS ONCE
Status: CANCELLED | OUTPATIENT
Start: 2019-05-03

## 2019-05-03 RX ORDER — FAMOTIDINE 10 MG/ML
20 INJECTION, SOLUTION INTRAVENOUS AS NEEDED
Status: CANCELLED | OUTPATIENT
Start: 2019-05-03

## 2019-05-03 RX ADMIN — PACLITAXEL 140 MG: 6 INJECTION, SOLUTION INTRAVENOUS at 10:37

## 2019-05-03 RX ADMIN — SODIUM CHLORIDE 250 ML: 9 INJECTION, SOLUTION INTRAVENOUS at 09:31

## 2019-05-03 RX ADMIN — DIPHENHYDRAMINE HYDROCHLORIDE 50 MG: 50 INJECTION, SOLUTION INTRAMUSCULAR; INTRAVENOUS at 09:49

## 2019-05-03 RX ADMIN — FAMOTIDINE 20 MG: 10 INJECTION, SOLUTION INTRAVENOUS at 09:31

## 2019-05-03 RX ADMIN — DEXAMETHASONE SODIUM PHOSPHATE 12 MG: 10 INJECTION, SOLUTION INTRAMUSCULAR; INTRAVENOUS at 09:33

## 2019-05-03 NOTE — PROGRESS NOTES
Post treatment manual /100. Reviewed w/ michael Tapia for D/C. Patient instructed not to take any naproxen today and tomorrow as it can raise BP and that Dr. Dixon recommends trying tylenol instead. Pt v/u.

## 2019-05-10 ENCOUNTER — INFUSION (OUTPATIENT)
Dept: ONCOLOGY | Facility: HOSPITAL | Age: 59
End: 2019-05-10

## 2019-05-10 VITALS
HEART RATE: 84 BPM | BODY MASS INDEX: 27.28 KG/M2 | SYSTOLIC BLOOD PRESSURE: 160 MMHG | WEIGHT: 158.8 LBS | DIASTOLIC BLOOD PRESSURE: 98 MMHG | TEMPERATURE: 98.1 F

## 2019-05-10 DIAGNOSIS — Z45.2 FITTING AND ADJUSTMENT OF VASCULAR CATHETER: ICD-10-CM

## 2019-05-10 DIAGNOSIS — C50.812 MALIGNANT NEOPLASM OF OVERLAPPING SITES OF LEFT FEMALE BREAST, UNSPECIFIED ESTROGEN RECEPTOR STATUS (HCC): Primary | ICD-10-CM

## 2019-05-10 LAB
ALBUMIN SERPL-MCNC: 4.3 G/DL (ref 3.5–5.2)
ALBUMIN/GLOB SERPL: 1.9 G/DL (ref 1.1–2.4)
ALP SERPL-CCNC: 88 U/L (ref 38–116)
ALT SERPL W P-5'-P-CCNC: 70 U/L (ref 0–33)
ANION GAP SERPL CALCULATED.3IONS-SCNC: 11.2 MMOL/L
AST SERPL-CCNC: 50 U/L (ref 0–32)
BASOPHILS # BLD AUTO: 0.07 10*3/MM3 (ref 0–0.2)
BASOPHILS NFR BLD AUTO: 1.8 % (ref 0–1.5)
BILIRUB SERPL-MCNC: 0.2 MG/DL (ref 0.2–1.2)
BUN BLD-MCNC: 26 MG/DL (ref 6–20)
BUN/CREAT SERPL: 40 (ref 7.3–30)
CALCIUM SPEC-SCNC: 9.4 MG/DL (ref 8.5–10.2)
CHLORIDE SERPL-SCNC: 108 MMOL/L (ref 98–107)
CO2 SERPL-SCNC: 23.8 MMOL/L (ref 22–29)
CREAT BLD-MCNC: 0.65 MG/DL (ref 0.6–1.1)
DEPRECATED RDW RBC AUTO: 71.3 FL (ref 37–54)
EOSINOPHIL # BLD AUTO: 0.41 10*3/MM3 (ref 0–0.4)
EOSINOPHIL NFR BLD AUTO: 10.4 % (ref 0.3–6.2)
ERYTHROCYTE [DISTWIDTH] IN BLOOD BY AUTOMATED COUNT: 20.5 % (ref 12.3–15.4)
GFR SERPL CREATININE-BSD FRML MDRD: 93 ML/MIN/1.73
GLOBULIN UR ELPH-MCNC: 2.3 GM/DL (ref 1.8–3.5)
GLUCOSE BLD-MCNC: 113 MG/DL (ref 74–124)
HCT VFR BLD AUTO: 30.4 % (ref 34–46.6)
HGB BLD-MCNC: 9.4 G/DL (ref 12–15.9)
IMM GRANULOCYTES # BLD AUTO: 0.03 10*3/MM3 (ref 0–0.05)
IMM GRANULOCYTES NFR BLD AUTO: 0.8 % (ref 0–0.5)
LYMPHOCYTES # BLD AUTO: 0.63 10*3/MM3 (ref 0.7–3.1)
LYMPHOCYTES NFR BLD AUTO: 16 % (ref 19.6–45.3)
MCH RBC QN AUTO: 29.1 PG (ref 26.6–33)
MCHC RBC AUTO-ENTMCNC: 30.9 G/DL (ref 31.5–35.7)
MCV RBC AUTO: 94.1 FL (ref 79–97)
MONOCYTES # BLD AUTO: 0.43 10*3/MM3 (ref 0.1–0.9)
MONOCYTES NFR BLD AUTO: 10.9 % (ref 5–12)
NEUTROPHILS # BLD AUTO: 2.37 10*3/MM3 (ref 1.7–7)
NEUTROPHILS NFR BLD AUTO: 60.1 % (ref 42.7–76)
NRBC BLD AUTO-RTO: 0 /100 WBC (ref 0–0.2)
PLATELET # BLD AUTO: 256 10*3/MM3 (ref 140–450)
PMV BLD AUTO: 8.8 FL (ref 6–12)
POTASSIUM BLD-SCNC: 4.1 MMOL/L (ref 3.5–4.7)
PROT SERPL-MCNC: 6.6 G/DL (ref 6.3–8)
RBC # BLD AUTO: 3.23 10*6/MM3 (ref 3.77–5.28)
SODIUM BLD-SCNC: 143 MMOL/L (ref 134–145)
WBC NRBC COR # BLD: 3.94 10*3/MM3 (ref 3.4–10.8)

## 2019-05-10 PROCEDURE — 96413 CHEMO IV INFUSION 1 HR: CPT | Performed by: NURSE PRACTITIONER

## 2019-05-10 PROCEDURE — 25010000002 DIPHENHYDRAMINE PER 50 MG: Performed by: INTERNAL MEDICINE

## 2019-05-10 PROCEDURE — 25010000002 PACLITAXEL PER 30 MG: Performed by: NURSE PRACTITIONER

## 2019-05-10 PROCEDURE — 80053 COMPREHEN METABOLIC PANEL: CPT | Performed by: NURSE PRACTITIONER

## 2019-05-10 PROCEDURE — 85025 COMPLETE CBC W/AUTO DIFF WBC: CPT | Performed by: NURSE PRACTITIONER

## 2019-05-10 PROCEDURE — 25010000002 DEXAMETHASONE SODIUM PHOSPHATE 100 MG/10ML SOLUTION: Performed by: NURSE PRACTITIONER

## 2019-05-10 PROCEDURE — 96375 TX/PRO/DX INJ NEW DRUG ADDON: CPT | Performed by: NURSE PRACTITIONER

## 2019-05-10 RX ORDER — SODIUM CHLORIDE 0.9 % (FLUSH) 0.9 %
10 SYRINGE (ML) INJECTION AS NEEDED
Status: CANCELLED | OUTPATIENT
Start: 2019-05-10

## 2019-05-10 RX ORDER — SODIUM CHLORIDE 0.9 % (FLUSH) 0.9 %
10 SYRINGE (ML) INJECTION AS NEEDED
Status: DISCONTINUED | OUTPATIENT
Start: 2019-05-10 | End: 2019-05-10 | Stop reason: HOSPADM

## 2019-05-10 RX ORDER — DIPHENHYDRAMINE HYDROCHLORIDE 50 MG/ML
50 INJECTION INTRAMUSCULAR; INTRAVENOUS AS NEEDED
Status: CANCELLED | OUTPATIENT
Start: 2019-05-10

## 2019-05-10 RX ORDER — FAMOTIDINE 10 MG/ML
20 INJECTION, SOLUTION INTRAVENOUS AS NEEDED
Status: CANCELLED | OUTPATIENT
Start: 2019-05-10

## 2019-05-10 RX ORDER — SODIUM CHLORIDE 9 MG/ML
250 INJECTION, SOLUTION INTRAVENOUS ONCE
Status: COMPLETED | OUTPATIENT
Start: 2019-05-10 | End: 2019-05-10

## 2019-05-10 RX ADMIN — DEXAMETHASONE SODIUM PHOSPHATE 12 MG: 10 INJECTION, SOLUTION INTRAMUSCULAR; INTRAVENOUS at 14:41

## 2019-05-10 RX ADMIN — SODIUM CHLORIDE, PRESERVATIVE FREE 500 UNITS: 5 INJECTION INTRAVENOUS at 16:36

## 2019-05-10 RX ADMIN — FAMOTIDINE 20 MG: 10 INJECTION, SOLUTION INTRAVENOUS at 14:22

## 2019-05-10 RX ADMIN — DIPHENHYDRAMINE HYDROCHLORIDE 25 MG: 50 INJECTION, SOLUTION INTRAMUSCULAR; INTRAVENOUS at 14:23

## 2019-05-10 RX ADMIN — Medication 10 ML: at 16:35

## 2019-05-10 RX ADMIN — SODIUM CHLORIDE 250 ML: 9 INJECTION, SOLUTION INTRAVENOUS at 14:15

## 2019-05-10 RX ADMIN — PACLITAXEL 140 MG: 6 INJECTION, SOLUTION INTRAVENOUS at 15:29

## 2019-05-10 NOTE — PROGRESS NOTES
Pt here today for Taxol cycle #2.  Pt denies any c/o.  Her Liver functions are elevated today. She denies taking any tylenol.  She states that she did have a wine cooler last night with dinner, but other than that she has not had any alcohol.    Dr. Dixon is not here today.  Dr. Allen (MD #2) came to chairside to evaluate patient.  Per Dr. Allen ok to treat today.  We will need to do a STAT CMP and check liver enzymes next week prior to treatment. R&V DEBBY Murrell     Lab Results   Component Value Date    GLUCOSE 113 05/10/2019    BUN 26 (H) 05/10/2019    CREATININE 0.65 05/10/2019    EGFRIFNONA 93 05/10/2019    BCR 40.0 (H) 05/10/2019    K 4.1 05/10/2019    CO2 23.8 05/10/2019    CALCIUM 9.4 05/10/2019    ALBUMIN 4.30 05/10/2019    AST 50 (H) 05/10/2019    ALT 70 (H) 05/10/2019

## 2019-05-16 DIAGNOSIS — C50.812 MALIGNANT NEOPLASM OF OVERLAPPING SITES OF LEFT FEMALE BREAST, UNSPECIFIED ESTROGEN RECEPTOR STATUS (HCC): ICD-10-CM

## 2019-05-17 ENCOUNTER — INFUSION (OUTPATIENT)
Dept: ONCOLOGY | Facility: HOSPITAL | Age: 59
End: 2019-05-17

## 2019-05-17 ENCOUNTER — OFFICE VISIT (OUTPATIENT)
Dept: ONCOLOGY | Facility: CLINIC | Age: 59
End: 2019-05-17

## 2019-05-17 VITALS
DIASTOLIC BLOOD PRESSURE: 90 MMHG | BODY MASS INDEX: 26.98 KG/M2 | TEMPERATURE: 97.8 F | SYSTOLIC BLOOD PRESSURE: 144 MMHG | OXYGEN SATURATION: 96 % | WEIGHT: 158 LBS | RESPIRATION RATE: 14 BRPM | HEART RATE: 66 BPM | HEIGHT: 64 IN

## 2019-05-17 VITALS — HEART RATE: 63 BPM | DIASTOLIC BLOOD PRESSURE: 91 MMHG | SYSTOLIC BLOOD PRESSURE: 155 MMHG

## 2019-05-17 DIAGNOSIS — Z45.2 FITTING AND ADJUSTMENT OF VASCULAR CATHETER: ICD-10-CM

## 2019-05-17 DIAGNOSIS — C50.812 MALIGNANT NEOPLASM OF OVERLAPPING SITES OF LEFT FEMALE BREAST, UNSPECIFIED ESTROGEN RECEPTOR STATUS (HCC): ICD-10-CM

## 2019-05-17 DIAGNOSIS — Z17.0 MALIGNANT NEOPLASM OF OVERLAPPING SITES OF LEFT BREAST IN FEMALE, ESTROGEN RECEPTOR POSITIVE (HCC): Primary | ICD-10-CM

## 2019-05-17 DIAGNOSIS — C50.812 MALIGNANT NEOPLASM OF OVERLAPPING SITES OF LEFT BREAST IN FEMALE, ESTROGEN RECEPTOR POSITIVE (HCC): Primary | ICD-10-CM

## 2019-05-17 DIAGNOSIS — C50.812 MALIGNANT NEOPLASM OF OVERLAPPING SITES OF LEFT FEMALE BREAST, UNSPECIFIED ESTROGEN RECEPTOR STATUS (HCC): Primary | ICD-10-CM

## 2019-05-17 LAB
ALBUMIN SERPL-MCNC: 4.5 G/DL (ref 3.5–5.2)
ALBUMIN/GLOB SERPL: 2 G/DL (ref 1.1–2.4)
ALP SERPL-CCNC: 82 U/L (ref 38–116)
ALT SERPL W P-5'-P-CCNC: 58 U/L (ref 0–33)
ANION GAP SERPL CALCULATED.3IONS-SCNC: 10.8 MMOL/L
AST SERPL-CCNC: 47 U/L (ref 0–32)
BASOPHILS # BLD AUTO: 0.06 10*3/MM3 (ref 0–0.2)
BASOPHILS NFR BLD AUTO: 1.6 % (ref 0–1.5)
BILIRUB SERPL-MCNC: 0.3 MG/DL (ref 0.2–1.2)
BUN BLD-MCNC: 20 MG/DL (ref 6–20)
BUN/CREAT SERPL: 26 (ref 7.3–30)
CALCIUM SPEC-SCNC: 9.3 MG/DL (ref 8.5–10.2)
CHLORIDE SERPL-SCNC: 107 MMOL/L (ref 98–107)
CO2 SERPL-SCNC: 23.2 MMOL/L (ref 22–29)
CREAT BLD-MCNC: 0.77 MG/DL (ref 0.6–1.1)
DEPRECATED RDW RBC AUTO: 67.3 FL (ref 37–54)
EOSINOPHIL # BLD AUTO: 0.59 10*3/MM3 (ref 0–0.4)
EOSINOPHIL NFR BLD AUTO: 15.6 % (ref 0.3–6.2)
ERYTHROCYTE [DISTWIDTH] IN BLOOD BY AUTOMATED COUNT: 19.4 % (ref 12.3–15.4)
GFR SERPL CREATININE-BSD FRML MDRD: 76 ML/MIN/1.73
GLOBULIN UR ELPH-MCNC: 2.3 GM/DL (ref 1.8–3.5)
GLUCOSE BLD-MCNC: 92 MG/DL (ref 74–124)
HCT VFR BLD AUTO: 32.6 % (ref 34–46.6)
HGB BLD-MCNC: 9.9 G/DL (ref 12–15.9)
IMM GRANULOCYTES # BLD AUTO: 0.02 10*3/MM3 (ref 0–0.05)
IMM GRANULOCYTES NFR BLD AUTO: 0.5 % (ref 0–0.5)
LYMPHOCYTES # BLD AUTO: 0.66 10*3/MM3 (ref 0.7–3.1)
LYMPHOCYTES NFR BLD AUTO: 17.5 % (ref 19.6–45.3)
MCH RBC QN AUTO: 28.7 PG (ref 26.6–33)
MCHC RBC AUTO-ENTMCNC: 30.4 G/DL (ref 31.5–35.7)
MCV RBC AUTO: 94.5 FL (ref 79–97)
MONOCYTES # BLD AUTO: 0.28 10*3/MM3 (ref 0.1–0.9)
MONOCYTES NFR BLD AUTO: 7.4 % (ref 5–12)
NEUTROPHILS # BLD AUTO: 2.16 10*3/MM3 (ref 1.7–7)
NEUTROPHILS NFR BLD AUTO: 57.4 % (ref 42.7–76)
NRBC BLD AUTO-RTO: 0 /100 WBC (ref 0–0.2)
PLATELET # BLD AUTO: 288 10*3/MM3 (ref 140–450)
PMV BLD AUTO: 9.2 FL (ref 6–12)
POTASSIUM BLD-SCNC: 4.2 MMOL/L (ref 3.5–4.7)
PROT SERPL-MCNC: 6.8 G/DL (ref 6.3–8)
RBC # BLD AUTO: 3.45 10*6/MM3 (ref 3.77–5.28)
SODIUM BLD-SCNC: 141 MMOL/L (ref 134–145)
WBC NRBC COR # BLD: 3.77 10*3/MM3 (ref 3.4–10.8)

## 2019-05-17 PROCEDURE — 96413 CHEMO IV INFUSION 1 HR: CPT | Performed by: NURSE PRACTITIONER

## 2019-05-17 PROCEDURE — 80053 COMPREHEN METABOLIC PANEL: CPT

## 2019-05-17 PROCEDURE — 25010000002 PACLITAXEL PER 30 MG: Performed by: NURSE PRACTITIONER

## 2019-05-17 PROCEDURE — 25010000002 DEXAMETHASONE SODIUM PHOSPHATE 100 MG/10ML SOLUTION: Performed by: NURSE PRACTITIONER

## 2019-05-17 PROCEDURE — 99214 OFFICE O/P EST MOD 30 MIN: CPT | Performed by: NURSE PRACTITIONER

## 2019-05-17 PROCEDURE — 96375 TX/PRO/DX INJ NEW DRUG ADDON: CPT | Performed by: NURSE PRACTITIONER

## 2019-05-17 PROCEDURE — 85025 COMPLETE CBC W/AUTO DIFF WBC: CPT

## 2019-05-17 RX ORDER — SODIUM CHLORIDE 0.9 % (FLUSH) 0.9 %
10 SYRINGE (ML) INJECTION AS NEEDED
Status: CANCELLED | OUTPATIENT
Start: 2019-05-17

## 2019-05-17 RX ORDER — SODIUM CHLORIDE 0.9 % (FLUSH) 0.9 %
10 SYRINGE (ML) INJECTION AS NEEDED
Status: DISCONTINUED | OUTPATIENT
Start: 2019-05-17 | End: 2019-05-17 | Stop reason: HOSPADM

## 2019-05-17 RX ORDER — FAMOTIDINE 10 MG/ML
20 INJECTION, SOLUTION INTRAVENOUS ONCE
Status: CANCELLED | OUTPATIENT
Start: 2019-05-17

## 2019-05-17 RX ORDER — SODIUM CHLORIDE 9 MG/ML
250 INJECTION, SOLUTION INTRAVENOUS ONCE
Status: CANCELLED | OUTPATIENT
Start: 2019-05-17

## 2019-05-17 RX ORDER — SODIUM CHLORIDE 9 MG/ML
250 INJECTION, SOLUTION INTRAVENOUS ONCE
Status: COMPLETED | OUTPATIENT
Start: 2019-05-17 | End: 2019-05-17

## 2019-05-17 RX ORDER — FAMOTIDINE 10 MG/ML
20 INJECTION, SOLUTION INTRAVENOUS AS NEEDED
Status: CANCELLED | OUTPATIENT
Start: 2019-05-17

## 2019-05-17 RX ORDER — DIPHENHYDRAMINE HYDROCHLORIDE 50 MG/ML
50 INJECTION INTRAMUSCULAR; INTRAVENOUS AS NEEDED
Status: CANCELLED | OUTPATIENT
Start: 2019-05-17

## 2019-05-17 RX ADMIN — Medication 500 UNITS: at 11:20

## 2019-05-17 RX ADMIN — SODIUM CHLORIDE, PRESERVATIVE FREE 10 ML: 5 INJECTION INTRAVENOUS at 11:20

## 2019-05-17 RX ADMIN — PACLITAXEL 140 MG: 6 INJECTION, SOLUTION INTRAVENOUS at 10:20

## 2019-05-17 RX ADMIN — SODIUM CHLORIDE 250 ML: 900 INJECTION, SOLUTION INTRAVENOUS at 09:29

## 2019-05-17 RX ADMIN — DEXAMETHASONE SODIUM PHOSPHATE 12 MG: 10 INJECTION, SOLUTION INTRAMUSCULAR; INTRAVENOUS at 09:29

## 2019-05-17 RX ADMIN — FAMOTIDINE 20 MG: 10 INJECTION, SOLUTION INTRAVENOUS at 09:29

## 2019-05-17 NOTE — PROGRESS NOTES
Subjective     REASON FOR FOLLOW UP:  1. 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 R BREAST MASS WITH GIANT R AXILLARY ADENOPATHY.  3. FAMILY HISTORY OF BREAST CANCER IN MOTHER AND SISTER, SISTER WAS TESTED FOR BRCA AND WAS NEGATIVE.                          History of Present Illness  Patient is a 59 year old female with the above mentioned history here today for her third cycle of weekly Taxol.  She complains of difficulty with the Benadryl premedication, more than anything.  It is causing her to have restless legs and affecting her blood pressure.  Otherwise, she continues to be quite active and working.  She does have some nausea, but is relieved with antiemetics.  She reports intermittent issues with constipation and diarrhea.  No fevers or chills.  No neuropathy symptoms.  The pain in her right hip is stable.  She denies any bleeding issues.    Past Medical History:   Diagnosis Date   • Arthritis    • Breast cancer (CMS/HCC)     Left   • Hip pain     RIGHT HIP... CYST   • History of fracture of leg 1987   • Skin sore     OPEN SORE LEFT BREAST           Past Surgical History:   Procedure Laterality Date   • FRACTURE SURGERY  1987    Leg   • HARDWARE REMOVAL     • VENOUS ACCESS DEVICE (PORT) INSERTION Right 2/1/2019    Procedure: INSERTION VENOUS ACCESS DEVICE;  Surgeon: Joby Barron Jr., MD;  [facility] [place] [facility];  Service: General        Current Outpatient Medications on File Prior to Visit   Medication Sig Dispense Refill   • acetaminophen (TYLENOL) 500 MG tablet Take 500 mg by mouth Every 6 (Six) Hours As Needed for Mild Pain .     • cephalexin (KEFLEX) 500 MG capsule Take 1 capsule by mouth 3 (Three) Times a Day. 21 capsule 3   • Cholecalciferol (VITAMIN D-3) 5000 units tablet Take  by mouth.     • diclofenac sodium (VOTAREN XR) 100 MG 24 hr tablet Take 1 tablet by mouth Daily. 30 tablet 3   • IBUPROFEN PO Take 1 tablet by mouth As Needed.     • metroNIDAZOLE (METROGEL) 1  % gel Apply  topically to the appropriate area as directed Daily. 1 tube 1   • olopatadine (PATANOL) 0.1 % ophthalmic solution Administer 1 drop to both eyes 2 (Two) Times a Day. 5 mL 5   • ondansetron (ZOFRAN) 8 MG tablet Take 1 tablet by mouth 3 (Three) Times a Day As Needed for Nausea or Vomiting. 30 tablet 5   • oxyCODONE-acetaminophen (PERCOCET) 5-325 MG per tablet 1 q 6 hrs prn pain 20 tablet 0   • coenzyme Q10 100 MG capsule Take 100 mg by mouth Daily.     • ferrous sulfate 325 (65 FE) MG tablet Take 1 tablet by mouth Every Other Day. 15 tablet 1   • Krill Oil 1000 MG capsule Take 1,000 mg by mouth Daily.       No current facility-administered medications on file prior to visit.         ALLERGIES:    Allergies   Allergen Reactions   • Erythromycin GI Intolerance   • Levaquin [Levofloxacin] GI Intolerance   • Penicillins GI Intolerance        Social History     Socioeconomic History   • Marital status:      Spouse name: Cruz   • Number of children: 0   • Years of education: Not on file   • Highest education level: Not on file   Occupational History   • Occupation:      Employer: SELF-EMPLOYED   Social Needs   • Financial resource strain: Not hard at all   • Food insecurity:     Worry: Never true     Inability: Never true   • Transportation needs:     Medical: No     Non-medical: No   Tobacco Use   • Smoking status: Never Smoker   • Smokeless tobacco: Never Used   Substance and Sexual Activity   • Alcohol use: Yes     Alcohol/week: 4.2 oz     Types: 4 Glasses of wine, 3 Cans of beer per week     Frequency: 2-3 times a week     Drinks per session: 1 or 2     Binge frequency: Never     Comment: OCCASIONALLY, NONE IN LAST TWO DAYS   • Drug use: No   • Sexual activity: Not Currently     Partners: Male     Birth control/protection: Post-menopausal   Lifestyle   • Physical activity:     Days per week: 7 days     Minutes per session: 120 min   • Stress: Only a little        Family History  "  Problem Relation Age of Onset   • Lung cancer Father    • Cancer Mother    • Breast cancer Mother    • Liver disease Mother    • Breast cancer Sister 48   • Breast cancer Maternal Grandmother    • Breast cancer Paternal Grandmother    • Breast cancer Maternal Uncle    • Malig Hyperthermia Neg Hx         Review of Systems   Constitutional: Negative for activity change, appetite change, chills, fatigue and fever.   HENT: Negative for mouth sores, nosebleeds and trouble swallowing.    Respiratory: Negative for cough and shortness of breath.    Cardiovascular: Negative for chest pain and leg swelling.   Gastrointestinal: Positive for nausea (mild). Negative for abdominal pain and vomiting.        See HPI   Genitourinary: Negative for difficulty urinating.   Musculoskeletal:        Right hip pain   Skin: Positive for wound. Negative for rash.   Neurological: Negative for dizziness, weakness and numbness.   Hematological: Negative for adenopathy. Does not bruise/bleed easily.   Psychiatric/Behavioral: Negative for sleep disturbance.          Objective     Vitals:    05/17/19 0852   BP: 144/90   Pulse: 66   Resp: 14   Temp: 97.8 °F (36.6 °C)   SpO2: 96%   Weight: 71.7 kg (158 lb)   Height: 162.5 cm (63.98\")   PainSc:   6   PainLoc: Hip     Current Status 5/17/2019   ECOG score 0       Physical Exam   Constitutional: She is oriented to person, place, and time. She appears well-developed and well-nourished. No distress.   HENT:   Head: Normocephalic and atraumatic.   Mouth/Throat: Oropharynx is clear and moist and mucous membranes are normal. No oropharyngeal exudate.   Eyes: Pupils are equal, round, and reactive to light.   Neck: Normal range of motion.   Cardiovascular: Normal rate, regular rhythm and normal heart sounds.   No murmur heard.  Pulmonary/Chest: Effort normal and breath sounds normal. No respiratory distress. She has no wheezes. She has no rhonchi. She has no rales.   Abdominal: Soft. Normal appearance and " bowel sounds are normal. She exhibits no distension. There is no hepatosplenomegaly.   Musculoskeletal: Normal range of motion. She exhibits no edema.   Neurological: She is alert and oriented to person, place, and time.   Skin: Skin is warm and dry. No rash noted.   Psychiatric: She has a normal mood and affect.   Vitals reviewed.     BREASTS: not examined today, previously:  The palpable mass in the right axilla is now 4 cm in size, round, unform, mobile, nontender with no fluctuation and detached from the soft tissues.  There are no palpable masses in the right breast.     The left breast is back to normal size from 3 times the size before.  She still has a palpable mass that occupies most of the breast parenchymal.  The area of ulceration and necrosis visible in the skin was dramatically improved to the point of close to closure and there is no odor.  There is no palpable left axillary adenopathy.  There is no lymphedema in either extremity.          RECENT LABS:  Hematology WBC   Date Value Ref Range Status   05/17/2019 3.77 3.40 - 10.80 10*3/mm3 Final     RBC   Date Value Ref Range Status   05/17/2019 3.45 (L) 3.77 - 5.28 10*6/mm3 Final     Hemoglobin   Date Value Ref Range Status   05/17/2019 9.9 (L) 12.0 - 15.9 g/dL Final     Hematocrit   Date Value Ref Range Status   05/17/2019 32.6 (L) 34.0 - 46.6 % Final     Platelets   Date Value Ref Range Status   05/17/2019 288 140 - 450 10*3/mm3 Final              Assessment/Plan    1.  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 At least for the last 4 years, she has had an in crescendo mass in the left breast that has produced a gigantic tumor that is now close to 25 cm in size and is replacing most of the anatomy of the left breast. There are areas of ulceration necrosis and bleeding and the mass is fixed to the chest wall. She  has abundant amount of collateral circulation in the left anterior chest wall and it caught my attention the lack of any left axillary adenopathy. She has no lymphedema in the left upper extremity. In the right breast while in sitting position, no palpable abnormalities are documented. The right breast skin and nipple are normal. When the patient lies flat, there is a palpable mass at 9 o'clock from the nipple that measures probably 4 cm in size, very indistinct in regard to edges and margins. There was no mobility and no areas of tenderness. She has a giant adenopathy in the right axilla that measures close to 9 cm in size, uniform, no fluctuation, nontender, completely fixed to the axillary wall. There is no compromise of the skin.     After completion of 4 cycles of AC patient has had very dramatic improvement in all sites of disease including right axilla and left breast.  The physical examination is very dramatic regarding resolution of symptomatology on the left side and ulceration as well as dramatic decrease in the size of the left breast that was almost 3 times the size of the right breast.     Completed 4 cycles Adriamycin Cytoxan on 4/12/2019.    Patient started weekly Taxol treatments on 5/3/2019.  As of 5/17/2019 patient is due for her third weekly Taxol treatment.  She is overall tolerating the Taxol well, other than the Benadryl premedication, which is causing issues with restless legs and elevated blood pressure.  We will discontinue the Benadryl.    2.  Mild elevation in liver function: AST is 47, ALT 58 today.  Normal bilirubin and alkaline phosphatase.  We will continue to closely monitor.    PLAN:  1. Proceed with cycle 3 weekly Taxol today.  We will omit Benadryl due to side effects.   2. Return in 1 week for her fourth cycle of weekly Taxol.  3. Return in 2 weeks for follow-up visit with Dr. Dixon for reevaluation prior to her fifth cycle of weekly Taxol.  4. Plans  at the completion of Taxol  to reassess radiologically with CT scans.  If everything is okay then patient will proceed with bilateral mastectomy and removal of right axilla lymph node.  Patient thereafter will need radiation to her chest wall bilaterally and  adjuvant hormonal therapy for the rest of her life.  5. Right hip pain will need to be radiologically and orthopedically assessed at some point.  6. Patient will also eventually need a bone density.

## 2019-05-24 ENCOUNTER — INFUSION (OUTPATIENT)
Dept: ONCOLOGY | Facility: HOSPITAL | Age: 59
End: 2019-05-24

## 2019-05-24 VITALS
DIASTOLIC BLOOD PRESSURE: 98 MMHG | WEIGHT: 156.2 LBS | TEMPERATURE: 98.3 F | SYSTOLIC BLOOD PRESSURE: 164 MMHG | HEART RATE: 88 BPM | BODY MASS INDEX: 26.83 KG/M2

## 2019-05-24 DIAGNOSIS — C50.812 MALIGNANT NEOPLASM OF OVERLAPPING SITES OF LEFT FEMALE BREAST, UNSPECIFIED ESTROGEN RECEPTOR STATUS (HCC): ICD-10-CM

## 2019-05-24 DIAGNOSIS — D63.0 ANEMIA IN NEOPLASTIC DISEASE: ICD-10-CM

## 2019-05-24 DIAGNOSIS — Z17.0 MALIGNANT NEOPLASM OF OVERLAPPING SITES OF LEFT BREAST IN FEMALE, ESTROGEN RECEPTOR POSITIVE (HCC): Primary | ICD-10-CM

## 2019-05-24 DIAGNOSIS — Z45.2 FITTING AND ADJUSTMENT OF VASCULAR CATHETER: ICD-10-CM

## 2019-05-24 DIAGNOSIS — Z80.3 FAMILY HISTORY OF BREAST CANCER IN FEMALE: ICD-10-CM

## 2019-05-24 DIAGNOSIS — C50.812 MALIGNANT NEOPLASM OF OVERLAPPING SITES OF LEFT BREAST IN FEMALE, ESTROGEN RECEPTOR POSITIVE (HCC): Primary | ICD-10-CM

## 2019-05-24 LAB
ALBUMIN SERPL-MCNC: 4.3 G/DL (ref 3.5–5.2)
ALBUMIN/GLOB SERPL: 1.8 G/DL (ref 1.1–2.4)
ALP SERPL-CCNC: 91 U/L (ref 38–116)
ALT SERPL W P-5'-P-CCNC: 66 U/L (ref 0–33)
ANION GAP SERPL CALCULATED.3IONS-SCNC: 12.7 MMOL/L
AST SERPL-CCNC: 53 U/L (ref 0–32)
BASOPHILS # BLD AUTO: 0.04 10*3/MM3 (ref 0–0.2)
BASOPHILS NFR BLD AUTO: 1.1 % (ref 0–1.5)
BILIRUB SERPL-MCNC: 0.3 MG/DL (ref 0.2–1.2)
BUN BLD-MCNC: 24 MG/DL (ref 6–20)
BUN/CREAT SERPL: 32.9 (ref 7.3–30)
CALCIUM SPEC-SCNC: 9.3 MG/DL (ref 8.5–10.2)
CHLORIDE SERPL-SCNC: 109 MMOL/L (ref 98–107)
CO2 SERPL-SCNC: 23.3 MMOL/L (ref 22–29)
CREAT BLD-MCNC: 0.73 MG/DL (ref 0.6–1.1)
DEPRECATED RDW RBC AUTO: 65.4 FL (ref 37–54)
EOSINOPHIL # BLD AUTO: 0.32 10*3/MM3 (ref 0–0.4)
EOSINOPHIL NFR BLD AUTO: 8.5 % (ref 0.3–6.2)
ERYTHROCYTE [DISTWIDTH] IN BLOOD BY AUTOMATED COUNT: 18.4 % (ref 12.3–15.4)
GFR SERPL CREATININE-BSD FRML MDRD: 81 ML/MIN/1.73
GLOBULIN UR ELPH-MCNC: 2.4 GM/DL (ref 1.8–3.5)
GLUCOSE BLD-MCNC: 111 MG/DL (ref 74–124)
HCT VFR BLD AUTO: 32.5 % (ref 34–46.6)
HGB BLD-MCNC: 10.1 G/DL (ref 12–15.9)
IMM GRANULOCYTES # BLD AUTO: 0.01 10*3/MM3 (ref 0–0.05)
IMM GRANULOCYTES NFR BLD AUTO: 0.3 % (ref 0–0.5)
LYMPHOCYTES # BLD AUTO: 0.64 10*3/MM3 (ref 0.7–3.1)
LYMPHOCYTES NFR BLD AUTO: 17 % (ref 19.6–45.3)
MCH RBC QN AUTO: 29.9 PG (ref 26.6–33)
MCHC RBC AUTO-ENTMCNC: 31.1 G/DL (ref 31.5–35.7)
MCV RBC AUTO: 96.2 FL (ref 79–97)
MONOCYTES # BLD AUTO: 0.27 10*3/MM3 (ref 0.1–0.9)
MONOCYTES NFR BLD AUTO: 7.2 % (ref 5–12)
NEUTROPHILS # BLD AUTO: 2.49 10*3/MM3 (ref 1.7–7)
NEUTROPHILS NFR BLD AUTO: 65.9 % (ref 42.7–76)
NRBC BLD AUTO-RTO: 0 /100 WBC (ref 0–0.2)
PLATELET # BLD AUTO: 288 10*3/MM3 (ref 140–450)
PMV BLD AUTO: 8.9 FL (ref 6–12)
POTASSIUM BLD-SCNC: 3.9 MMOL/L (ref 3.5–4.7)
PROT SERPL-MCNC: 6.7 G/DL (ref 6.3–8)
RBC # BLD AUTO: 3.38 10*6/MM3 (ref 3.77–5.28)
SODIUM BLD-SCNC: 145 MMOL/L (ref 134–145)
WBC NRBC COR # BLD: 3.77 10*3/MM3 (ref 3.4–10.8)

## 2019-05-24 PROCEDURE — 96413 CHEMO IV INFUSION 1 HR: CPT | Performed by: NURSE PRACTITIONER

## 2019-05-24 PROCEDURE — 36415 COLL VENOUS BLD VENIPUNCTURE: CPT | Performed by: NURSE PRACTITIONER

## 2019-05-24 PROCEDURE — 25010000002 DEXAMETHASONE SODIUM PHOSPHATE 100 MG/10ML SOLUTION: Performed by: NURSE PRACTITIONER

## 2019-05-24 PROCEDURE — 25010000002 PACLITAXEL PER 30 MG: Performed by: NURSE PRACTITIONER

## 2019-05-24 PROCEDURE — 80053 COMPREHEN METABOLIC PANEL: CPT | Performed by: NURSE PRACTITIONER

## 2019-05-24 PROCEDURE — 96375 TX/PRO/DX INJ NEW DRUG ADDON: CPT | Performed by: NURSE PRACTITIONER

## 2019-05-24 PROCEDURE — 85025 COMPLETE CBC W/AUTO DIFF WBC: CPT | Performed by: NURSE PRACTITIONER

## 2019-05-24 RX ORDER — SODIUM CHLORIDE 9 MG/ML
250 INJECTION, SOLUTION INTRAVENOUS ONCE
Status: COMPLETED | OUTPATIENT
Start: 2019-05-24 | End: 2019-05-24

## 2019-05-24 RX ORDER — FAMOTIDINE 10 MG/ML
20 INJECTION, SOLUTION INTRAVENOUS AS NEEDED
Status: CANCELLED | OUTPATIENT
Start: 2019-05-24

## 2019-05-24 RX ORDER — FAMOTIDINE 10 MG/ML
20 INJECTION, SOLUTION INTRAVENOUS ONCE
Status: COMPLETED | OUTPATIENT
Start: 2019-05-24 | End: 2019-05-24

## 2019-05-24 RX ORDER — DIPHENHYDRAMINE HYDROCHLORIDE 50 MG/ML
50 INJECTION INTRAMUSCULAR; INTRAVENOUS AS NEEDED
Status: CANCELLED | OUTPATIENT
Start: 2019-05-24

## 2019-05-24 RX ADMIN — DEXAMETHASONE SODIUM PHOSPHATE 12 MG: 10 INJECTION, SOLUTION INTRAMUSCULAR; INTRAVENOUS at 13:27

## 2019-05-24 RX ADMIN — SODIUM CHLORIDE 250 ML: 9 INJECTION, SOLUTION INTRAVENOUS at 13:22

## 2019-05-24 RX ADMIN — PACLITAXEL 140 MG: 6 INJECTION, SOLUTION INTRAVENOUS at 14:16

## 2019-05-24 RX ADMIN — FAMOTIDINE 20 MG: 10 INJECTION, SOLUTION INTRAVENOUS at 13:25

## 2019-05-31 ENCOUNTER — INFUSION (OUTPATIENT)
Dept: ONCOLOGY | Facility: HOSPITAL | Age: 59
End: 2019-05-31

## 2019-05-31 ENCOUNTER — OFFICE VISIT (OUTPATIENT)
Dept: ONCOLOGY | Facility: CLINIC | Age: 59
End: 2019-05-31

## 2019-05-31 VITALS
OXYGEN SATURATION: 99 % | TEMPERATURE: 98.2 F | SYSTOLIC BLOOD PRESSURE: 149 MMHG | WEIGHT: 156.7 LBS | HEART RATE: 71 BPM | HEIGHT: 64 IN | BODY MASS INDEX: 26.75 KG/M2 | DIASTOLIC BLOOD PRESSURE: 88 MMHG | RESPIRATION RATE: 12 BRPM

## 2019-05-31 DIAGNOSIS — I87.8 POOR VENOUS ACCESS: ICD-10-CM

## 2019-05-31 DIAGNOSIS — C50.812 MALIGNANT NEOPLASM OF OVERLAPPING SITES OF LEFT FEMALE BREAST, UNSPECIFIED ESTROGEN RECEPTOR STATUS (HCC): Primary | ICD-10-CM

## 2019-05-31 DIAGNOSIS — D63.0 ANEMIA IN NEOPLASTIC DISEASE: ICD-10-CM

## 2019-05-31 DIAGNOSIS — Z45.2 FITTING AND ADJUSTMENT OF VASCULAR CATHETER: ICD-10-CM

## 2019-05-31 DIAGNOSIS — C50.812 MALIGNANT NEOPLASM OF OVERLAPPING SITES OF LEFT BREAST IN FEMALE, ESTROGEN RECEPTOR POSITIVE (HCC): Primary | ICD-10-CM

## 2019-05-31 DIAGNOSIS — R22.31 AXILLARY MASS, RIGHT: ICD-10-CM

## 2019-05-31 DIAGNOSIS — C50.812 MALIGNANT NEOPLASM OF OVERLAPPING SITES OF LEFT BREAST IN FEMALE, ESTROGEN RECEPTOR POSITIVE (HCC): ICD-10-CM

## 2019-05-31 DIAGNOSIS — Z17.0 MALIGNANT NEOPLASM OF OVERLAPPING SITES OF LEFT BREAST IN FEMALE, ESTROGEN RECEPTOR POSITIVE (HCC): ICD-10-CM

## 2019-05-31 DIAGNOSIS — C50.812 MALIGNANT NEOPLASM OF OVERLAPPING SITES OF LEFT FEMALE BREAST, UNSPECIFIED ESTROGEN RECEPTOR STATUS (HCC): ICD-10-CM

## 2019-05-31 DIAGNOSIS — Z17.0 MALIGNANT NEOPLASM OF OVERLAPPING SITES OF LEFT BREAST IN FEMALE, ESTROGEN RECEPTOR POSITIVE (HCC): Primary | ICD-10-CM

## 2019-05-31 DIAGNOSIS — Z80.3 FAMILY HISTORY OF BREAST CANCER IN FEMALE: ICD-10-CM

## 2019-05-31 LAB
ALBUMIN SERPL-MCNC: 4.4 G/DL (ref 3.5–5.2)
ALBUMIN/GLOB SERPL: 1.9 G/DL (ref 1.1–2.4)
ALP SERPL-CCNC: 86 U/L (ref 38–116)
ALT SERPL W P-5'-P-CCNC: 45 U/L (ref 0–33)
ANION GAP SERPL CALCULATED.3IONS-SCNC: 11.5 MMOL/L
AST SERPL-CCNC: 32 U/L (ref 0–32)
BASOPHILS # BLD AUTO: 0.07 10*3/MM3 (ref 0–0.2)
BASOPHILS NFR BLD AUTO: 1.9 % (ref 0–1.5)
BILIRUB SERPL-MCNC: 0.3 MG/DL (ref 0.2–1.2)
BUN BLD-MCNC: 20 MG/DL (ref 6–20)
BUN/CREAT SERPL: 28.6 (ref 7.3–30)
CALCIUM SPEC-SCNC: 9.5 MG/DL (ref 8.5–10.2)
CHLORIDE SERPL-SCNC: 104 MMOL/L (ref 98–107)
CO2 SERPL-SCNC: 24.5 MMOL/L (ref 22–29)
CREAT BLD-MCNC: 0.7 MG/DL (ref 0.6–1.1)
DEPRECATED RDW RBC AUTO: 60.2 FL (ref 37–54)
EOSINOPHIL # BLD AUTO: 0.17 10*3/MM3 (ref 0–0.4)
EOSINOPHIL NFR BLD AUTO: 4.6 % (ref 0.3–6.2)
ERYTHROCYTE [DISTWIDTH] IN BLOOD BY AUTOMATED COUNT: 17.2 % (ref 12.3–15.4)
GFR SERPL CREATININE-BSD FRML MDRD: 85 ML/MIN/1.73
GLOBULIN UR ELPH-MCNC: 2.3 GM/DL (ref 1.8–3.5)
GLUCOSE BLD-MCNC: 109 MG/DL (ref 74–124)
HCT VFR BLD AUTO: 33.2 % (ref 34–46.6)
HGB BLD-MCNC: 10.1 G/DL (ref 12–15.9)
IMM GRANULOCYTES # BLD AUTO: 0.01 10*3/MM3 (ref 0–0.05)
IMM GRANULOCYTES NFR BLD AUTO: 0.3 % (ref 0–0.5)
LYMPHOCYTES # BLD AUTO: 0.77 10*3/MM3 (ref 0.7–3.1)
LYMPHOCYTES NFR BLD AUTO: 21 % (ref 19.6–45.3)
MCH RBC QN AUTO: 29.3 PG (ref 26.6–33)
MCHC RBC AUTO-ENTMCNC: 30.4 G/DL (ref 31.5–35.7)
MCV RBC AUTO: 96.2 FL (ref 79–97)
MONOCYTES # BLD AUTO: 0.26 10*3/MM3 (ref 0.1–0.9)
MONOCYTES NFR BLD AUTO: 7.1 % (ref 5–12)
NEUTROPHILS # BLD AUTO: 2.38 10*3/MM3 (ref 1.7–7)
NEUTROPHILS NFR BLD AUTO: 65.1 % (ref 42.7–76)
NRBC BLD AUTO-RTO: 0 /100 WBC (ref 0–0.2)
PLATELET # BLD AUTO: 309 10*3/MM3 (ref 140–450)
PMV BLD AUTO: 8.9 FL (ref 6–12)
POTASSIUM BLD-SCNC: 4.2 MMOL/L (ref 3.5–4.7)
PROT SERPL-MCNC: 6.7 G/DL (ref 6.3–8)
RBC # BLD AUTO: 3.45 10*6/MM3 (ref 3.77–5.28)
SODIUM BLD-SCNC: 140 MMOL/L (ref 134–145)
WBC NRBC COR # BLD: 3.66 10*3/MM3 (ref 3.4–10.8)

## 2019-05-31 PROCEDURE — 96413 CHEMO IV INFUSION 1 HR: CPT | Performed by: INTERNAL MEDICINE

## 2019-05-31 PROCEDURE — 25010000002 PACLITAXEL PER 30 MG: Performed by: INTERNAL MEDICINE

## 2019-05-31 PROCEDURE — 25010000002 DEXAMETHASONE SODIUM PHOSPHATE 100 MG/10ML SOLUTION: Performed by: INTERNAL MEDICINE

## 2019-05-31 PROCEDURE — 85025 COMPLETE CBC W/AUTO DIFF WBC: CPT

## 2019-05-31 PROCEDURE — 80053 COMPREHEN METABOLIC PANEL: CPT

## 2019-05-31 PROCEDURE — 96375 TX/PRO/DX INJ NEW DRUG ADDON: CPT | Performed by: INTERNAL MEDICINE

## 2019-05-31 PROCEDURE — 99215 OFFICE O/P EST HI 40 MIN: CPT | Performed by: INTERNAL MEDICINE

## 2019-05-31 RX ORDER — SODIUM CHLORIDE 9 MG/ML
250 INJECTION, SOLUTION INTRAVENOUS ONCE
Status: COMPLETED | OUTPATIENT
Start: 2019-05-31 | End: 2019-05-31

## 2019-05-31 RX ORDER — PREDNISONE 20 MG/1
20 TABLET ORAL DAILY
Qty: 20 TABLET | Refills: 0 | Status: SHIPPED | OUTPATIENT
Start: 2019-05-31 | End: 2019-06-03

## 2019-05-31 RX ORDER — DIPHENHYDRAMINE HYDROCHLORIDE 50 MG/ML
50 INJECTION INTRAMUSCULAR; INTRAVENOUS AS NEEDED
Status: CANCELLED | OUTPATIENT
Start: 2019-05-31

## 2019-05-31 RX ORDER — FAMOTIDINE 10 MG/ML
20 INJECTION, SOLUTION INTRAVENOUS ONCE
Status: CANCELLED | OUTPATIENT
Start: 2019-05-31

## 2019-05-31 RX ORDER — FAMOTIDINE 10 MG/ML
20 INJECTION, SOLUTION INTRAVENOUS AS NEEDED
Status: CANCELLED | OUTPATIENT
Start: 2019-05-31

## 2019-05-31 RX ORDER — SODIUM CHLORIDE 9 MG/ML
250 INJECTION, SOLUTION INTRAVENOUS ONCE
Status: CANCELLED | OUTPATIENT
Start: 2019-05-31

## 2019-05-31 RX ADMIN — DEXAMETHASONE SODIUM PHOSPHATE 12 MG: 10 INJECTION, SOLUTION INTRAMUSCULAR; INTRAVENOUS at 13:42

## 2019-05-31 RX ADMIN — SODIUM CHLORIDE 250 ML: 9 INJECTION, SOLUTION INTRAVENOUS at 13:41

## 2019-05-31 RX ADMIN — PACLITAXEL 140 MG: 6 INJECTION, SOLUTION INTRAVENOUS at 14:32

## 2019-05-31 RX ADMIN — FAMOTIDINE 20 MG: 10 INJECTION, SOLUTION INTRAVENOUS at 13:41

## 2019-06-05 RX ORDER — FAMOTIDINE 10 MG/ML
20 INJECTION, SOLUTION INTRAVENOUS AS NEEDED
Status: CANCELLED | OUTPATIENT
Start: 2019-06-14

## 2019-06-05 RX ORDER — DIPHENHYDRAMINE HYDROCHLORIDE 50 MG/ML
50 INJECTION INTRAMUSCULAR; INTRAVENOUS AS NEEDED
Status: CANCELLED | OUTPATIENT
Start: 2019-06-14

## 2019-06-05 RX ORDER — FAMOTIDINE 10 MG/ML
20 INJECTION, SOLUTION INTRAVENOUS AS NEEDED
Status: CANCELLED | OUTPATIENT
Start: 2019-06-07

## 2019-06-05 RX ORDER — DIPHENHYDRAMINE HYDROCHLORIDE 50 MG/ML
50 INJECTION INTRAMUSCULAR; INTRAVENOUS AS NEEDED
Status: CANCELLED | OUTPATIENT
Start: 2019-06-07

## 2019-06-05 RX ORDER — FAMOTIDINE 10 MG/ML
20 INJECTION, SOLUTION INTRAVENOUS ONCE
Status: CANCELLED | OUTPATIENT
Start: 2019-06-07

## 2019-06-05 RX ORDER — SODIUM CHLORIDE 9 MG/ML
250 INJECTION, SOLUTION INTRAVENOUS ONCE
Status: CANCELLED | OUTPATIENT
Start: 2019-06-07

## 2019-06-05 RX ORDER — SODIUM CHLORIDE 9 MG/ML
250 INJECTION, SOLUTION INTRAVENOUS ONCE
Status: CANCELLED | OUTPATIENT
Start: 2019-06-14

## 2019-06-05 RX ORDER — FAMOTIDINE 10 MG/ML
20 INJECTION, SOLUTION INTRAVENOUS ONCE
Status: CANCELLED | OUTPATIENT
Start: 2019-06-14

## 2019-06-07 ENCOUNTER — INFUSION (OUTPATIENT)
Dept: ONCOLOGY | Facility: HOSPITAL | Age: 59
End: 2019-06-07

## 2019-06-07 VITALS
BODY MASS INDEX: 26.6 KG/M2 | TEMPERATURE: 98 F | SYSTOLIC BLOOD PRESSURE: 156 MMHG | HEART RATE: 74 BPM | DIASTOLIC BLOOD PRESSURE: 99 MMHG | WEIGHT: 155.8 LBS

## 2019-06-07 DIAGNOSIS — R22.31 AXILLARY MASS, RIGHT: ICD-10-CM

## 2019-06-07 DIAGNOSIS — D63.0 ANEMIA IN NEOPLASTIC DISEASE: ICD-10-CM

## 2019-06-07 DIAGNOSIS — Z45.2 FITTING AND ADJUSTMENT OF VASCULAR CATHETER: ICD-10-CM

## 2019-06-07 DIAGNOSIS — C50.812 MALIGNANT NEOPLASM OF OVERLAPPING SITES OF LEFT FEMALE BREAST, UNSPECIFIED ESTROGEN RECEPTOR STATUS (HCC): ICD-10-CM

## 2019-06-07 DIAGNOSIS — I87.8 POOR VENOUS ACCESS: ICD-10-CM

## 2019-06-07 DIAGNOSIS — C50.812 MALIGNANT NEOPLASM OF OVERLAPPING SITES OF LEFT BREAST IN FEMALE, ESTROGEN RECEPTOR POSITIVE (HCC): Primary | ICD-10-CM

## 2019-06-07 DIAGNOSIS — Z17.0 MALIGNANT NEOPLASM OF OVERLAPPING SITES OF LEFT BREAST IN FEMALE, ESTROGEN RECEPTOR POSITIVE (HCC): Primary | ICD-10-CM

## 2019-06-07 LAB
ALBUMIN SERPL-MCNC: 4.4 G/DL (ref 3.5–5.2)
ALBUMIN/GLOB SERPL: 2 G/DL (ref 1.1–2.4)
ALP SERPL-CCNC: 79 U/L (ref 38–116)
ALT SERPL W P-5'-P-CCNC: 38 U/L (ref 0–33)
ANION GAP SERPL CALCULATED.3IONS-SCNC: 11.8 MMOL/L
AST SERPL-CCNC: 30 U/L (ref 0–32)
BASOPHILS # BLD AUTO: 0.03 10*3/MM3 (ref 0–0.2)
BASOPHILS NFR BLD AUTO: 0.7 % (ref 0–1.5)
BILIRUB SERPL-MCNC: 0.3 MG/DL (ref 0.2–1.2)
BUN BLD-MCNC: 25 MG/DL (ref 6–20)
BUN/CREAT SERPL: 33.8 (ref 7.3–30)
CALCIUM SPEC-SCNC: 9.3 MG/DL (ref 8.5–10.2)
CHLORIDE SERPL-SCNC: 105 MMOL/L (ref 98–107)
CO2 SERPL-SCNC: 24.2 MMOL/L (ref 22–29)
CREAT BLD-MCNC: 0.74 MG/DL (ref 0.6–1.1)
DEPRECATED RDW RBC AUTO: 59.4 FL (ref 37–54)
EOSINOPHIL # BLD AUTO: 0.12 10*3/MM3 (ref 0–0.4)
EOSINOPHIL NFR BLD AUTO: 2.9 % (ref 0.3–6.2)
ERYTHROCYTE [DISTWIDTH] IN BLOOD BY AUTOMATED COUNT: 16.8 % (ref 12.3–15.4)
GFR SERPL CREATININE-BSD FRML MDRD: 80 ML/MIN/1.73
GLOBULIN UR ELPH-MCNC: 2.2 GM/DL (ref 1.8–3.5)
GLUCOSE BLD-MCNC: 107 MG/DL (ref 74–124)
HCT VFR BLD AUTO: 33.1 % (ref 34–46.6)
HGB BLD-MCNC: 10.3 G/DL (ref 12–15.9)
IMM GRANULOCYTES # BLD AUTO: 0.02 10*3/MM3 (ref 0–0.05)
IMM GRANULOCYTES NFR BLD AUTO: 0.5 % (ref 0–0.5)
LYMPHOCYTES # BLD AUTO: 0.59 10*3/MM3 (ref 0.7–3.1)
LYMPHOCYTES NFR BLD AUTO: 14.5 % (ref 19.6–45.3)
MCH RBC QN AUTO: 29.9 PG (ref 26.6–33)
MCHC RBC AUTO-ENTMCNC: 31.1 G/DL (ref 31.5–35.7)
MCV RBC AUTO: 96.2 FL (ref 79–97)
MONOCYTES # BLD AUTO: 0.27 10*3/MM3 (ref 0.1–0.9)
MONOCYTES NFR BLD AUTO: 6.6 % (ref 5–12)
NEUTROPHILS # BLD AUTO: 3.04 10*3/MM3 (ref 1.7–7)
NEUTROPHILS NFR BLD AUTO: 74.8 % (ref 42.7–76)
NRBC BLD AUTO-RTO: 0 /100 WBC (ref 0–0.2)
PLATELET # BLD AUTO: 234 10*3/MM3 (ref 140–450)
PMV BLD AUTO: 8.4 FL (ref 6–12)
POTASSIUM BLD-SCNC: 4.3 MMOL/L (ref 3.5–4.7)
PROT SERPL-MCNC: 6.6 G/DL (ref 6.3–8)
RBC # BLD AUTO: 3.44 10*6/MM3 (ref 3.77–5.28)
SODIUM BLD-SCNC: 141 MMOL/L (ref 134–145)
WBC NRBC COR # BLD: 4.07 10*3/MM3 (ref 3.4–10.8)

## 2019-06-07 PROCEDURE — 25010000002 DEXAMETHASONE SODIUM PHOSPHATE 100 MG/10ML SOLUTION: Performed by: NURSE PRACTITIONER

## 2019-06-07 PROCEDURE — 80053 COMPREHEN METABOLIC PANEL: CPT | Performed by: INTERNAL MEDICINE

## 2019-06-07 PROCEDURE — 36415 COLL VENOUS BLD VENIPUNCTURE: CPT | Performed by: INTERNAL MEDICINE

## 2019-06-07 PROCEDURE — 25010000002 PACLITAXEL PER 30 MG: Performed by: NURSE PRACTITIONER

## 2019-06-07 PROCEDURE — 96413 CHEMO IV INFUSION 1 HR: CPT | Performed by: INTERNAL MEDICINE

## 2019-06-07 PROCEDURE — 96375 TX/PRO/DX INJ NEW DRUG ADDON: CPT | Performed by: INTERNAL MEDICINE

## 2019-06-07 PROCEDURE — 85025 COMPLETE CBC W/AUTO DIFF WBC: CPT | Performed by: INTERNAL MEDICINE

## 2019-06-07 RX ORDER — SODIUM CHLORIDE 0.9 % (FLUSH) 0.9 %
10 SYRINGE (ML) INJECTION AS NEEDED
Status: CANCELLED | OUTPATIENT
Start: 2019-06-07

## 2019-06-07 RX ORDER — SODIUM CHLORIDE 9 MG/ML
250 INJECTION, SOLUTION INTRAVENOUS ONCE
Status: COMPLETED | OUTPATIENT
Start: 2019-06-07 | End: 2019-06-07

## 2019-06-07 RX ORDER — FAMOTIDINE 10 MG/ML
20 INJECTION, SOLUTION INTRAVENOUS ONCE
Status: COMPLETED | OUTPATIENT
Start: 2019-06-07 | End: 2019-06-07

## 2019-06-07 RX ADMIN — SODIUM CHLORIDE 250 ML: 9 INJECTION, SOLUTION INTRAVENOUS at 08:24

## 2019-06-07 RX ADMIN — Medication 500 UNITS: at 10:31

## 2019-06-07 RX ADMIN — FAMOTIDINE 20 MG: 10 INJECTION, SOLUTION INTRAVENOUS at 08:25

## 2019-06-07 RX ADMIN — DEXAMETHASONE SODIUM PHOSPHATE 12 MG: 10 INJECTION, SOLUTION INTRAMUSCULAR; INTRAVENOUS at 09:09

## 2019-06-07 RX ADMIN — PACLITAXEL 140 MG: 6 INJECTION, SOLUTION INTRAVENOUS at 09:27

## 2019-06-14 ENCOUNTER — OFFICE VISIT (OUTPATIENT)
Dept: ONCOLOGY | Facility: CLINIC | Age: 59
End: 2019-06-14

## 2019-06-14 ENCOUNTER — INFUSION (OUTPATIENT)
Dept: ONCOLOGY | Facility: HOSPITAL | Age: 59
End: 2019-06-14

## 2019-06-14 VITALS
OXYGEN SATURATION: 99 % | WEIGHT: 157.1 LBS | SYSTOLIC BLOOD PRESSURE: 143 MMHG | DIASTOLIC BLOOD PRESSURE: 81 MMHG | TEMPERATURE: 98 F | HEART RATE: 61 BPM | RESPIRATION RATE: 14 BRPM | BODY MASS INDEX: 26.82 KG/M2 | HEIGHT: 64 IN

## 2019-06-14 DIAGNOSIS — D63.0 ANEMIA IN NEOPLASTIC DISEASE: ICD-10-CM

## 2019-06-14 DIAGNOSIS — Z17.0 MALIGNANT NEOPLASM OF OVERLAPPING SITES OF LEFT BREAST IN FEMALE, ESTROGEN RECEPTOR POSITIVE (HCC): Primary | ICD-10-CM

## 2019-06-14 DIAGNOSIS — Z17.0 MALIGNANT NEOPLASM OF OVERLAPPING SITES OF LEFT BREAST IN FEMALE, ESTROGEN RECEPTOR POSITIVE (HCC): ICD-10-CM

## 2019-06-14 DIAGNOSIS — C50.812 MALIGNANT NEOPLASM OF OVERLAPPING SITES OF LEFT FEMALE BREAST, UNSPECIFIED ESTROGEN RECEPTOR STATUS (HCC): Primary | ICD-10-CM

## 2019-06-14 DIAGNOSIS — Z80.3 FAMILY HISTORY OF BREAST CANCER IN FEMALE: ICD-10-CM

## 2019-06-14 DIAGNOSIS — Z45.2 FITTING AND ADJUSTMENT OF VASCULAR CATHETER: ICD-10-CM

## 2019-06-14 DIAGNOSIS — C50.812 MALIGNANT NEOPLASM OF OVERLAPPING SITES OF LEFT BREAST IN FEMALE, ESTROGEN RECEPTOR POSITIVE (HCC): ICD-10-CM

## 2019-06-14 DIAGNOSIS — C50.812 MALIGNANT NEOPLASM OF OVERLAPPING SITES OF LEFT BREAST IN FEMALE, ESTROGEN RECEPTOR POSITIVE (HCC): Primary | ICD-10-CM

## 2019-06-14 LAB
ALBUMIN SERPL-MCNC: 4.3 G/DL (ref 3.5–5.2)
ALBUMIN/GLOB SERPL: 2 G/DL (ref 1.1–2.4)
ALP SERPL-CCNC: 72 U/L (ref 38–116)
ALT SERPL W P-5'-P-CCNC: 38 U/L (ref 0–33)
ANION GAP SERPL CALCULATED.3IONS-SCNC: 10.3 MMOL/L
AST SERPL-CCNC: 33 U/L (ref 0–32)
BASOPHILS # BLD AUTO: 0.03 10*3/MM3 (ref 0–0.2)
BASOPHILS NFR BLD AUTO: 0.9 % (ref 0–1.5)
BILIRUB SERPL-MCNC: 0.2 MG/DL (ref 0.2–1.2)
BUN BLD-MCNC: 20 MG/DL (ref 6–20)
BUN/CREAT SERPL: 27.8 (ref 7.3–30)
CALCIUM SPEC-SCNC: 9.4 MG/DL (ref 8.5–10.2)
CHLORIDE SERPL-SCNC: 105 MMOL/L (ref 98–107)
CO2 SERPL-SCNC: 23.7 MMOL/L (ref 22–29)
CREAT BLD-MCNC: 0.72 MG/DL (ref 0.6–1.1)
DEPRECATED RDW RBC AUTO: 58.4 FL (ref 37–54)
EOSINOPHIL # BLD AUTO: 0.15 10*3/MM3 (ref 0–0.4)
EOSINOPHIL NFR BLD AUTO: 4.4 % (ref 0.3–6.2)
ERYTHROCYTE [DISTWIDTH] IN BLOOD BY AUTOMATED COUNT: 16.3 % (ref 12.3–15.4)
GFR SERPL CREATININE-BSD FRML MDRD: 82 ML/MIN/1.73
GLOBULIN UR ELPH-MCNC: 2.2 GM/DL (ref 1.8–3.5)
GLUCOSE BLD-MCNC: 90 MG/DL (ref 74–124)
HCT VFR BLD AUTO: 33.9 % (ref 34–46.6)
HGB BLD-MCNC: 10.5 G/DL (ref 12–15.9)
IMM GRANULOCYTES # BLD AUTO: 0.02 10*3/MM3 (ref 0–0.05)
IMM GRANULOCYTES NFR BLD AUTO: 0.6 % (ref 0–0.5)
LYMPHOCYTES # BLD AUTO: 0.62 10*3/MM3 (ref 0.7–3.1)
LYMPHOCYTES NFR BLD AUTO: 18.1 % (ref 19.6–45.3)
MCH RBC QN AUTO: 30.1 PG (ref 26.6–33)
MCHC RBC AUTO-ENTMCNC: 31 G/DL (ref 31.5–35.7)
MCV RBC AUTO: 97.1 FL (ref 79–97)
MONOCYTES # BLD AUTO: 0.28 10*3/MM3 (ref 0.1–0.9)
MONOCYTES NFR BLD AUTO: 8.2 % (ref 5–12)
NEUTROPHILS # BLD AUTO: 2.32 10*3/MM3 (ref 1.7–7)
NEUTROPHILS NFR BLD AUTO: 67.8 % (ref 42.7–76)
NRBC BLD AUTO-RTO: 0 /100 WBC (ref 0–0.2)
PLATELET # BLD AUTO: 233 10*3/MM3 (ref 140–450)
PMV BLD AUTO: 8.5 FL (ref 6–12)
POTASSIUM BLD-SCNC: 4.3 MMOL/L (ref 3.5–4.7)
PROT SERPL-MCNC: 6.5 G/DL (ref 6.3–8)
RBC # BLD AUTO: 3.49 10*6/MM3 (ref 3.77–5.28)
SODIUM BLD-SCNC: 139 MMOL/L (ref 134–145)
WBC NRBC COR # BLD: 3.42 10*3/MM3 (ref 3.4–10.8)

## 2019-06-14 PROCEDURE — 85025 COMPLETE CBC W/AUTO DIFF WBC: CPT

## 2019-06-14 PROCEDURE — 96375 TX/PRO/DX INJ NEW DRUG ADDON: CPT | Performed by: NURSE PRACTITIONER

## 2019-06-14 PROCEDURE — 80053 COMPREHEN METABOLIC PANEL: CPT

## 2019-06-14 PROCEDURE — 25010000002 PACLITAXEL PER 30 MG: Performed by: INTERNAL MEDICINE

## 2019-06-14 PROCEDURE — 99213 OFFICE O/P EST LOW 20 MIN: CPT | Performed by: NURSE PRACTITIONER

## 2019-06-14 PROCEDURE — 25010000002 DEXAMETHASONE SODIUM PHOSPHATE 100 MG/10ML SOLUTION: Performed by: INTERNAL MEDICINE

## 2019-06-14 PROCEDURE — 96413 CHEMO IV INFUSION 1 HR: CPT | Performed by: NURSE PRACTITIONER

## 2019-06-14 RX ORDER — SODIUM CHLORIDE 0.9 % (FLUSH) 0.9 %
10 SYRINGE (ML) INJECTION AS NEEDED
Status: DISCONTINUED | OUTPATIENT
Start: 2019-06-14 | End: 2019-06-14 | Stop reason: HOSPADM

## 2019-06-14 RX ORDER — SODIUM CHLORIDE 9 MG/ML
250 INJECTION, SOLUTION INTRAVENOUS ONCE
Status: COMPLETED | OUTPATIENT
Start: 2019-06-14 | End: 2019-06-14

## 2019-06-14 RX ORDER — SODIUM CHLORIDE 0.9 % (FLUSH) 0.9 %
10 SYRINGE (ML) INJECTION AS NEEDED
Status: CANCELLED | OUTPATIENT
Start: 2019-06-14

## 2019-06-14 RX ORDER — FAMOTIDINE 10 MG/ML
20 INJECTION, SOLUTION INTRAVENOUS ONCE
Status: COMPLETED | OUTPATIENT
Start: 2019-06-14 | End: 2019-06-14

## 2019-06-14 RX ADMIN — Medication 500 UNITS: at 10:31

## 2019-06-14 RX ADMIN — SODIUM CHLORIDE 250 ML: 9 INJECTION, SOLUTION INTRAVENOUS at 08:46

## 2019-06-14 RX ADMIN — FAMOTIDINE 20 MG: 10 INJECTION, SOLUTION INTRAVENOUS at 08:46

## 2019-06-14 RX ADMIN — SODIUM CHLORIDE, PRESERVATIVE FREE 10 ML: 5 INJECTION INTRAVENOUS at 10:31

## 2019-06-14 RX ADMIN — DEXAMETHASONE SODIUM PHOSPHATE 12 MG: 10 INJECTION, SOLUTION INTRAMUSCULAR; INTRAVENOUS at 08:47

## 2019-06-14 RX ADMIN — PACLITAXEL 140 MG: 6 INJECTION, SOLUTION INTRAVENOUS at 09:34

## 2019-06-14 NOTE — PROGRESS NOTES
Subjective     REASON FOR FOLLOW UP:  1. 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 R BREAST MASS WITH GIANT R AXILLARY ADENOPATHY.  3. FAMILY HISTORY OF BREAST CANCER IN MOTHER AND SISTER, SISTER WAS TESTED FOR BRCA AND WAS NEGATIVE.                          History of Present Illness patient is a 59-year-old female with the above-mentioned history who is here today for cycle 7 weekly Taxol.  Overall, she is tolerating the Taxol fairly well.  She does report arthralgias for a few days after receiving treatment.  She does take ibuprofen 3 times a day.  The addition of steroids for 3 days has also significantly improved her arthralgias.  I have reminded her to make sure she take NSAIDs and the prednisone with food and that can be  irritating to her stomach.  She is not taking the Percocet.  She denies fevers or chills.  She denies any neuropathy symptoms.  Patient denies any other complaints or concerns today.      Past Medical History:   Diagnosis Date   • Arthritis    • Breast cancer (CMS/HCC)     Left   • Hip pain     RIGHT HIP... CYST   • History of fracture of leg 1987   • Skin sore     OPEN SORE LEFT BREAST           Past Surgical History:   Procedure Laterality Date   • FRACTURE SURGERY  1987    Leg   • HARDWARE REMOVAL     • VENOUS ACCESS DEVICE (PORT) INSERTION Right 2/1/2019    Procedure: INSERTION VENOUS ACCESS DEVICE;  Surgeon: Joby Barron Jr., MD;  Location: [facility] OR [facility] [place];  Service: General        Current Outpatient Medications on File Prior to Visit   Medication Sig Dispense Refill   • acetaminophen (TYLENOL) 500 MG tablet Take 500 mg by mouth Every 6 (Six) Hours As Needed for Mild Pain .     • cephalexin (KEFLEX) 500 MG capsule Take 1 capsule by mouth 3 (Three) Times a Day. 21 capsule 3   • Cholecalciferol (VITAMIN D-3) 5000 units tablet Take  by mouth.     • coenzyme Q10 100 MG capsule Take 100 mg by mouth Daily.     • diclofenac sodium (VOTAREN XR) 100 MG 24 hr tablet  Take 1 tablet by mouth Daily. 30 tablet 3   • ferrous sulfate 325 (65 FE) MG tablet Take 1 tablet by mouth Every Other Day. 15 tablet 1   • IBUPROFEN PO Take 1 tablet by mouth As Needed.     • Krill Oil 1000 MG capsule Take 1,000 mg by mouth Daily.     • metroNIDAZOLE (METROGEL) 1 % gel Apply  topically to the appropriate area as directed Daily. 1 tube 1   • Multiple Vitamin (MULTI VITAMIN DAILY PO) Take  by mouth.     • olopatadine (PATANOL) 0.1 % ophthalmic solution Administer 1 drop to both eyes 2 (Two) Times a Day. 5 mL 5   • ondansetron (ZOFRAN) 8 MG tablet Take 1 tablet by mouth 3 (Three) Times a Day As Needed for Nausea or Vomiting. 30 tablet 5   • oxyCODONE-acetaminophen (PERCOCET) 5-325 MG per tablet 1 q 6 hrs prn pain 20 tablet 0     No current facility-administered medications on file prior to visit.         ALLERGIES:    Allergies   Allergen Reactions   • Erythromycin GI Intolerance   • Levaquin [Levofloxacin] GI Intolerance   • Penicillins GI Intolerance        Social History     Socioeconomic History   • Marital status:      Spouse name: Cruz   • Number of children: 0   • Years of education: Not on file   • Highest education level: Not on file   Occupational History   • Occupation:      Employer: SELF-EMPLOYED   Social Needs   • Financial resource strain: Not hard at all   • Food insecurity:     Worry: Never true     Inability: Never true   • Transportation needs:     Medical: No     Non-medical: No   Tobacco Use   • Smoking status: Never Smoker   • Smokeless tobacco: Never Used   Substance and Sexual Activity   • Alcohol use: Yes     Alcohol/week: 4.2 oz     Types: 4 Glasses of wine, 3 Cans of beer per week     Frequency: 2-3 times a week     Drinks per session: 1 or 2     Binge frequency: Never     Comment: OCCASIONALLY, NONE IN LAST TWO DAYS   • Drug use: No   • Sexual activity: Not Currently     Partners: Male     Birth control/protection: Post-menopausal   Lifestyle   •  "Physical activity:     Days per week: 7 days     Minutes per session: 120 min   • Stress: Only a little        Family History   Problem Relation Age of Onset   • Lung cancer Father    • Cancer Mother    • Breast cancer Mother    • Liver disease Mother    • Breast cancer Sister 48   • Breast cancer Maternal Grandmother    • Breast cancer Paternal Grandmother    • Breast cancer Maternal Uncle    • Malig Hyperthermia Neg Hx         Review of Systems   Constitutional: Positive for fatigue. Negative for activity change, appetite change, chills and fever.   HENT: Negative for mouth sores, nosebleeds and trouble swallowing.    Respiratory: Negative for cough and shortness of breath.    Cardiovascular: Negative for chest pain and leg swelling.   Gastrointestinal: Positive for nausea (mild). Negative for abdominal pain and vomiting.   Genitourinary: Negative for difficulty urinating.   Musculoskeletal: Positive for arthralgias.        Right hip pain   Skin: Positive for wound. Negative for rash.   Neurological: Negative for dizziness, weakness and numbness.   Hematological: Negative for adenopathy. Does not bruise/bleed easily.   Psychiatric/Behavioral: Negative for sleep disturbance.   6/14/2019 review of systems unchanged from above except as updated.      Objective     Vitals:    06/14/19 0814   BP: 143/81   Pulse: 61   Resp: 14   Temp: 98 °F (36.7 °C)   TempSrc: Oral   SpO2: 99%   Weight: 71.3 kg (157 lb 1.6 oz)   Height: 163 cm (64.17\")   PainSc:   6   PainLoc: Hip     Current Status 6/14/2019   ECOG score 0       Physical Exam   Constitutional: She is oriented to person, place, and time. She appears well-developed and well-nourished. No distress.   HENT:   Head: Normocephalic and atraumatic.   Mouth/Throat: Oropharynx is clear and moist and mucous membranes are normal. No oropharyngeal exudate.   Eyes: Pupils are equal, round, and reactive to light.   Neck: Normal range of motion.   Cardiovascular: Normal rate, regular " rhythm and normal heart sounds.   No murmur heard.  Pulmonary/Chest: Effort normal and breath sounds normal. No respiratory distress. She has no wheezes. She has no rhonchi. She has no rales.   BREASTS: the right axillary lymph node now measuring 3 x 1.5 cm in size. This is smooth, uniform, mobile and not stuck to deep tissues and nontender with no fluctuation.     The left breast tumor measures close to 4 cm in size, mobile, no obvious palpable left axillary adenopathy.    Abdominal: Soft. Normal appearance and bowel sounds are normal. She exhibits no distension. There is no hepatosplenomegaly.   Musculoskeletal: Normal range of motion. She exhibits no edema.   Neurological: She is alert and oriented to person, place, and time.   Skin: Skin is warm and dry. No rash noted.   Psychiatric: She has a normal mood and affect.   Vitals reviewed.             RECENT LABS:  Hematology WBC   Date Value Ref Range Status   06/14/2019 3.42 3.40 - 10.80 10*3/mm3 Final     RBC   Date Value Ref Range Status   06/14/2019 3.49 (L) 3.77 - 5.28 10*6/mm3 Final     Hemoglobin   Date Value Ref Range Status   06/14/2019 10.5 (L) 12.0 - 15.9 g/dL Final     Hematocrit   Date Value Ref Range Status   06/14/2019 33.9 (L) 34.0 - 46.6 % Final     Platelets   Date Value Ref Range Status   06/14/2019 233 140 - 450 10*3/mm3 Final              Assessment/Plan    1.  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 At least for the last 4 years, she has had an in crescendo mass in the left breast that has produced a gigantic tumor that is now close to 25 cm in size and is replacing most of the anatomy of the left breast. There are areas of ulceration necrosis and bleeding and the mass is fixed to the chest wall. She has abundant amount of collateral circulation in the left anterior chest wall and it caught my attention  the lack of any left axillary adenopathy. She has no lymphedema in the left upper extremity. In the right breast while in sitting position, no palpable abnormalities are documented. The right breast skin and nipple are normal. When the patient lies flat, there is a palpable mass at 9 o'clock from the nipple that measures probably 4 cm in size, very indistinct in regard to edges and margins. There was no mobility and no areas of tenderness. She has a giant adenopathy in the right axilla that measures close to 9 cm in size, uniform, no fluctuation, nontender, completely fixed to the axillary wall. There is no compromise of the skin.     After completion of 4 cycles of AC patient has had very dramatic improvement in all sites of disease including right axilla and left breast.  The physical examination is very dramatic regarding resolution of symptomatology on the left side and ulceration as well as dramatic decrease in the size of the left breast that was almost 3 times the size of the right breast.     Completed 4 cycles Adriamycin Cytoxan on 4/12/2019.    Patient started weekly Taxol treatments on 5/3/2019.  5/17/2019 patient is due for her third weekly Taxol treatment.  She is overall tolerating the Taxol well, other than the Benadryl premedication, which is causing issues with restless legs and elevated blood pressure.  We will discontinue the Benadryl.  Patient return in 6/14/2019 for cycle 7 weekly Taxol, and feels like she is tolerating this much better.  She does report some arthralgias for a few days, but denies neuropathy symptoms.    2.  Mild elevation in liver function: ALT 38, AST 33.  Normal bilirubin and alkaline phosphatase.  Continue to closely monitor.     3.  Enthesopathy: On days 2 3 and 4 specially in the knees, shoulders, and elbows after each Taxol treatment.  We have started the patient on prednisone 20 mg daily on days 2 3 and 4 after each Taxol treatment, which is significantly  helped.      PLAN:  1. Proceed with cycle 7 weekly Taxol.  2. Continue prednisone 20 mg on days 2 3 and 4 after each Taxol.  3. Return in 1 week for cycle 8 weekly Taxol.  4. Return in 2 weeks for follow-up visit with Dr. Dixon for reevaluation prior to her ninth cycle of weekly Taxol.  5. Plans  at the completion of Taxol to reassess radiologically with CT scans.  If everything is okay then patient will proceed with bilateral mastectomy and removal of right axilla lymph node.  Patient thereafter will need radiation to her chest wall bilaterally and  adjuvant hormonal therapy for the rest of her life.  6. Right hip pain will need to be radiologically and orthopedically assessed at some point.  7. Patient will also eventually need a bone density.

## 2019-06-20 RX ORDER — DIPHENHYDRAMINE HYDROCHLORIDE 50 MG/ML
50 INJECTION INTRAMUSCULAR; INTRAVENOUS AS NEEDED
Status: CANCELLED | OUTPATIENT
Start: 2019-06-21

## 2019-06-20 RX ORDER — FAMOTIDINE 10 MG/ML
20 INJECTION, SOLUTION INTRAVENOUS AS NEEDED
Status: CANCELLED | OUTPATIENT
Start: 2019-06-21

## 2019-06-20 RX ORDER — FAMOTIDINE 10 MG/ML
20 INJECTION, SOLUTION INTRAVENOUS ONCE
Status: CANCELLED | OUTPATIENT
Start: 2019-06-21

## 2019-06-20 RX ORDER — SODIUM CHLORIDE 9 MG/ML
250 INJECTION, SOLUTION INTRAVENOUS ONCE
Status: CANCELLED | OUTPATIENT
Start: 2019-06-21

## 2019-06-21 ENCOUNTER — INFUSION (OUTPATIENT)
Dept: ONCOLOGY | Facility: HOSPITAL | Age: 59
End: 2019-06-21

## 2019-06-21 VITALS
WEIGHT: 155.8 LBS | BODY MASS INDEX: 26.6 KG/M2 | HEART RATE: 71 BPM | SYSTOLIC BLOOD PRESSURE: 131 MMHG | DIASTOLIC BLOOD PRESSURE: 85 MMHG | TEMPERATURE: 98.6 F

## 2019-06-21 DIAGNOSIS — R22.31 AXILLARY MASS, RIGHT: ICD-10-CM

## 2019-06-21 DIAGNOSIS — Z17.0 MALIGNANT NEOPLASM OF OVERLAPPING SITES OF LEFT BREAST IN FEMALE, ESTROGEN RECEPTOR POSITIVE (HCC): Primary | ICD-10-CM

## 2019-06-21 DIAGNOSIS — D63.0 ANEMIA IN NEOPLASTIC DISEASE: ICD-10-CM

## 2019-06-21 DIAGNOSIS — C50.812 MALIGNANT NEOPLASM OF OVERLAPPING SITES OF LEFT FEMALE BREAST, UNSPECIFIED ESTROGEN RECEPTOR STATUS (HCC): ICD-10-CM

## 2019-06-21 DIAGNOSIS — Z45.2 FITTING AND ADJUSTMENT OF VASCULAR CATHETER: ICD-10-CM

## 2019-06-21 DIAGNOSIS — C50.812 MALIGNANT NEOPLASM OF OVERLAPPING SITES OF LEFT BREAST IN FEMALE, ESTROGEN RECEPTOR POSITIVE (HCC): Primary | ICD-10-CM

## 2019-06-21 DIAGNOSIS — Z80.3 FAMILY HISTORY OF BREAST CANCER IN FEMALE: ICD-10-CM

## 2019-06-21 LAB
ALBUMIN SERPL-MCNC: 4.4 G/DL (ref 3.5–5.2)
ALBUMIN/GLOB SERPL: 2 G/DL (ref 1.1–2.4)
ALP SERPL-CCNC: 71 U/L (ref 38–116)
ALT SERPL W P-5'-P-CCNC: 34 U/L (ref 0–33)
ANION GAP SERPL CALCULATED.3IONS-SCNC: 11.6 MMOL/L
AST SERPL-CCNC: 28 U/L (ref 0–32)
BASOPHILS # BLD AUTO: 0.03 10*3/MM3 (ref 0–0.2)
BASOPHILS NFR BLD AUTO: 0.7 % (ref 0–1.5)
BILIRUB SERPL-MCNC: 0.3 MG/DL (ref 0.2–1.2)
BUN BLD-MCNC: 24 MG/DL (ref 6–20)
BUN/CREAT SERPL: 30.4 (ref 7.3–30)
CALCIUM SPEC-SCNC: 9.4 MG/DL (ref 8.5–10.2)
CANCER AG15-3 SERPL-ACNC: 17 U/ML
CHLORIDE SERPL-SCNC: 103 MMOL/L (ref 98–107)
CO2 SERPL-SCNC: 23.4 MMOL/L (ref 22–29)
CREAT BLD-MCNC: 0.79 MG/DL (ref 0.6–1.1)
DEPRECATED RDW RBC AUTO: 57.6 FL (ref 37–54)
EOSINOPHIL # BLD AUTO: 0.09 10*3/MM3 (ref 0–0.4)
EOSINOPHIL NFR BLD AUTO: 2 % (ref 0.3–6.2)
ERYTHROCYTE [DISTWIDTH] IN BLOOD BY AUTOMATED COUNT: 16.2 % (ref 12.3–15.4)
GFR SERPL CREATININE-BSD FRML MDRD: 74 ML/MIN/1.73
GLOBULIN UR ELPH-MCNC: 2.2 GM/DL (ref 1.8–3.5)
GLUCOSE BLD-MCNC: 101 MG/DL (ref 74–124)
HCT VFR BLD AUTO: 35.1 % (ref 34–46.6)
HGB BLD-MCNC: 11 G/DL (ref 12–15.9)
IMM GRANULOCYTES # BLD AUTO: 0.01 10*3/MM3 (ref 0–0.05)
IMM GRANULOCYTES NFR BLD AUTO: 0.2 % (ref 0–0.5)
LYMPHOCYTES # BLD AUTO: 0.55 10*3/MM3 (ref 0.7–3.1)
LYMPHOCYTES NFR BLD AUTO: 12.3 % (ref 19.6–45.3)
MCH RBC QN AUTO: 30.6 PG (ref 26.6–33)
MCHC RBC AUTO-ENTMCNC: 31.3 G/DL (ref 31.5–35.7)
MCV RBC AUTO: 97.5 FL (ref 79–97)
MONOCYTES # BLD AUTO: 0.3 10*3/MM3 (ref 0.1–0.9)
MONOCYTES NFR BLD AUTO: 6.7 % (ref 5–12)
NEUTROPHILS # BLD AUTO: 3.5 10*3/MM3 (ref 1.7–7)
NEUTROPHILS NFR BLD AUTO: 78.1 % (ref 42.7–76)
NRBC BLD AUTO-RTO: 0 /100 WBC (ref 0–0.2)
PLATELET # BLD AUTO: 225 10*3/MM3 (ref 140–450)
PMV BLD AUTO: 9.1 FL (ref 6–12)
POTASSIUM BLD-SCNC: 4.1 MMOL/L (ref 3.5–4.7)
PROT SERPL-MCNC: 6.6 G/DL (ref 6.3–8)
RBC # BLD AUTO: 3.6 10*6/MM3 (ref 3.77–5.28)
SODIUM BLD-SCNC: 138 MMOL/L (ref 134–145)
WBC NRBC COR # BLD: 4.48 10*3/MM3 (ref 3.4–10.8)

## 2019-06-21 PROCEDURE — 96375 TX/PRO/DX INJ NEW DRUG ADDON: CPT | Performed by: NURSE PRACTITIONER

## 2019-06-21 PROCEDURE — 85025 COMPLETE CBC W/AUTO DIFF WBC: CPT | Performed by: NURSE PRACTITIONER

## 2019-06-21 PROCEDURE — 96413 CHEMO IV INFUSION 1 HR: CPT | Performed by: NURSE PRACTITIONER

## 2019-06-21 PROCEDURE — 86300 IMMUNOASSAY TUMOR CA 15-3: CPT | Performed by: INTERNAL MEDICINE

## 2019-06-21 PROCEDURE — 25010000002 DEXAMETHASONE SODIUM PHOSPHATE 100 MG/10ML SOLUTION: Performed by: NURSE PRACTITIONER

## 2019-06-21 PROCEDURE — 25010000002 PACLITAXEL PER 30 MG: Performed by: NURSE PRACTITIONER

## 2019-06-21 PROCEDURE — 80053 COMPREHEN METABOLIC PANEL: CPT | Performed by: NURSE PRACTITIONER

## 2019-06-21 RX ORDER — SODIUM CHLORIDE 0.9 % (FLUSH) 0.9 %
10 SYRINGE (ML) INJECTION AS NEEDED
Status: CANCELLED | OUTPATIENT
Start: 2019-06-21

## 2019-06-21 RX ORDER — SODIUM CHLORIDE 9 MG/ML
250 INJECTION, SOLUTION INTRAVENOUS ONCE
Status: COMPLETED | OUTPATIENT
Start: 2019-06-21 | End: 2019-06-21

## 2019-06-21 RX ORDER — FAMOTIDINE 10 MG/ML
20 INJECTION, SOLUTION INTRAVENOUS ONCE
Status: COMPLETED | OUTPATIENT
Start: 2019-06-21 | End: 2019-06-21

## 2019-06-21 RX ADMIN — PACLITAXEL 140 MG: 6 INJECTION, SOLUTION INTRAVENOUS at 09:24

## 2019-06-21 RX ADMIN — Medication 500 UNITS: at 10:27

## 2019-06-21 RX ADMIN — SODIUM CHLORIDE 250 ML: 9 INJECTION, SOLUTION INTRAVENOUS at 08:40

## 2019-06-21 RX ADMIN — DEXAMETHASONE SODIUM PHOSPHATE 12 MG: 10 INJECTION, SOLUTION INTRAMUSCULAR; INTRAVENOUS at 08:41

## 2019-06-21 RX ADMIN — FAMOTIDINE 20 MG: 10 INJECTION INTRAVENOUS at 08:41

## 2019-06-28 ENCOUNTER — INFUSION (OUTPATIENT)
Dept: ONCOLOGY | Facility: HOSPITAL | Age: 59
End: 2019-06-28

## 2019-06-28 ENCOUNTER — OFFICE VISIT (OUTPATIENT)
Dept: ONCOLOGY | Facility: CLINIC | Age: 59
End: 2019-06-28

## 2019-06-28 VITALS
HEART RATE: 79 BPM | TEMPERATURE: 98.6 F | WEIGHT: 156.7 LBS | BODY MASS INDEX: 26.75 KG/M2 | OXYGEN SATURATION: 97 % | DIASTOLIC BLOOD PRESSURE: 75 MMHG | SYSTOLIC BLOOD PRESSURE: 115 MMHG | RESPIRATION RATE: 14 BRPM | HEIGHT: 64 IN

## 2019-06-28 VITALS — HEART RATE: 67 BPM | DIASTOLIC BLOOD PRESSURE: 74 MMHG | SYSTOLIC BLOOD PRESSURE: 115 MMHG

## 2019-06-28 DIAGNOSIS — C50.812 MALIGNANT NEOPLASM OF OVERLAPPING SITES OF LEFT BREAST IN FEMALE, ESTROGEN RECEPTOR POSITIVE (HCC): ICD-10-CM

## 2019-06-28 DIAGNOSIS — C50.812 MALIGNANT NEOPLASM OF OVERLAPPING SITES OF LEFT FEMALE BREAST, UNSPECIFIED ESTROGEN RECEPTOR STATUS (HCC): ICD-10-CM

## 2019-06-28 DIAGNOSIS — C50.812 MALIGNANT NEOPLASM OF OVERLAPPING SITES OF LEFT BREAST IN FEMALE, ESTROGEN RECEPTOR POSITIVE (HCC): Primary | ICD-10-CM

## 2019-06-28 DIAGNOSIS — Z17.0 MALIGNANT NEOPLASM OF OVERLAPPING SITES OF LEFT BREAST IN FEMALE, ESTROGEN RECEPTOR POSITIVE (HCC): Primary | ICD-10-CM

## 2019-06-28 DIAGNOSIS — Z45.2 FITTING AND ADJUSTMENT OF VASCULAR CATHETER: ICD-10-CM

## 2019-06-28 DIAGNOSIS — D63.0 ANEMIA IN NEOPLASTIC DISEASE: ICD-10-CM

## 2019-06-28 DIAGNOSIS — R22.31 AXILLARY MASS, RIGHT: ICD-10-CM

## 2019-06-28 DIAGNOSIS — I87.8 POOR VENOUS ACCESS: ICD-10-CM

## 2019-06-28 DIAGNOSIS — Z17.0 MALIGNANT NEOPLASM OF OVERLAPPING SITES OF LEFT BREAST IN FEMALE, ESTROGEN RECEPTOR POSITIVE (HCC): ICD-10-CM

## 2019-06-28 PROBLEM — C50.819 MALIGNANT NEOPLASM OF OVERLAPPING SITES OF BREAST IN FEMALE, ESTROGEN RECEPTOR POSITIVE: Status: ACTIVE | Noted: 2019-01-22

## 2019-06-28 LAB
ALBUMIN SERPL-MCNC: 4.4 G/DL (ref 3.5–5.2)
ALBUMIN/GLOB SERPL: 2 G/DL (ref 1.1–2.4)
ALP SERPL-CCNC: 70 U/L (ref 38–116)
ALT SERPL W P-5'-P-CCNC: 23 U/L (ref 0–33)
ANION GAP SERPL CALCULATED.3IONS-SCNC: 11.2 MMOL/L (ref 5–15)
AST SERPL-CCNC: 22 U/L (ref 0–32)
BASOPHILS # BLD AUTO: 0.03 10*3/MM3 (ref 0–0.2)
BASOPHILS NFR BLD AUTO: 0.9 % (ref 0–1.5)
BILIRUB SERPL-MCNC: 0.3 MG/DL (ref 0.2–1.2)
BUN BLD-MCNC: 21 MG/DL (ref 6–20)
BUN/CREAT SERPL: 25.6 (ref 7.3–30)
CALCIUM SPEC-SCNC: 9.5 MG/DL (ref 8.5–10.2)
CHLORIDE SERPL-SCNC: 106 MMOL/L (ref 98–107)
CO2 SERPL-SCNC: 23.8 MMOL/L (ref 22–29)
CREAT BLD-MCNC: 0.82 MG/DL (ref 0.6–1.1)
DEPRECATED RDW RBC AUTO: 57.4 FL (ref 37–54)
EOSINOPHIL # BLD AUTO: 0.11 10*3/MM3 (ref 0–0.4)
EOSINOPHIL NFR BLD AUTO: 3.2 % (ref 0.3–6.2)
ERYTHROCYTE [DISTWIDTH] IN BLOOD BY AUTOMATED COUNT: 16 % (ref 12.3–15.4)
GFR SERPL CREATININE-BSD FRML MDRD: 71 ML/MIN/1.73
GLOBULIN UR ELPH-MCNC: 2.2 GM/DL (ref 1.8–3.5)
GLUCOSE BLD-MCNC: 124 MG/DL (ref 74–124)
HCT VFR BLD AUTO: 35.6 % (ref 34–46.6)
HGB BLD-MCNC: 11.1 G/DL (ref 12–15.9)
IMM GRANULOCYTES # BLD AUTO: 0.01 10*3/MM3 (ref 0–0.05)
IMM GRANULOCYTES NFR BLD AUTO: 0.3 % (ref 0–0.5)
LYMPHOCYTES # BLD AUTO: 0.54 10*3/MM3 (ref 0.7–3.1)
LYMPHOCYTES NFR BLD AUTO: 15.7 % (ref 19.6–45.3)
MCH RBC QN AUTO: 30.2 PG (ref 26.6–33)
MCHC RBC AUTO-ENTMCNC: 31.2 G/DL (ref 31.5–35.7)
MCV RBC AUTO: 97 FL (ref 79–97)
MONOCYTES # BLD AUTO: 0.28 10*3/MM3 (ref 0.1–0.9)
MONOCYTES NFR BLD AUTO: 8.1 % (ref 5–12)
NEUTROPHILS # BLD AUTO: 2.47 10*3/MM3 (ref 1.7–7)
NEUTROPHILS NFR BLD AUTO: 71.8 % (ref 42.7–76)
NRBC BLD AUTO-RTO: 0 /100 WBC (ref 0–0.2)
PLATELET # BLD AUTO: 237 10*3/MM3 (ref 140–450)
PMV BLD AUTO: 8.5 FL (ref 6–12)
POTASSIUM BLD-SCNC: 4 MMOL/L (ref 3.5–4.7)
PROT SERPL-MCNC: 6.6 G/DL (ref 6.3–8)
RBC # BLD AUTO: 3.67 10*6/MM3 (ref 3.77–5.28)
SODIUM BLD-SCNC: 141 MMOL/L (ref 134–145)
WBC NRBC COR # BLD: 3.44 10*3/MM3 (ref 3.4–10.8)

## 2019-06-28 PROCEDURE — 25010000002 DEXAMETHASONE SODIUM PHOSPHATE 100 MG/10ML SOLUTION: Performed by: INTERNAL MEDICINE

## 2019-06-28 PROCEDURE — 99214 OFFICE O/P EST MOD 30 MIN: CPT | Performed by: INTERNAL MEDICINE

## 2019-06-28 PROCEDURE — 96375 TX/PRO/DX INJ NEW DRUG ADDON: CPT | Performed by: INTERNAL MEDICINE

## 2019-06-28 PROCEDURE — 80053 COMPREHEN METABOLIC PANEL: CPT

## 2019-06-28 PROCEDURE — 25010000002 PACLITAXEL PER 30 MG: Performed by: INTERNAL MEDICINE

## 2019-06-28 PROCEDURE — 85025 COMPLETE CBC W/AUTO DIFF WBC: CPT

## 2019-06-28 PROCEDURE — 96413 CHEMO IV INFUSION 1 HR: CPT | Performed by: INTERNAL MEDICINE

## 2019-06-28 RX ORDER — SODIUM CHLORIDE 0.9 % (FLUSH) 0.9 %
10 SYRINGE (ML) INJECTION AS NEEDED
Status: DISCONTINUED | OUTPATIENT
Start: 2019-06-28 | End: 2019-06-28 | Stop reason: HOSPADM

## 2019-06-28 RX ORDER — FAMOTIDINE 10 MG/ML
20 INJECTION, SOLUTION INTRAVENOUS ONCE
Status: CANCELLED | OUTPATIENT
Start: 2019-06-28

## 2019-06-28 RX ORDER — FAMOTIDINE 10 MG/ML
20 INJECTION, SOLUTION INTRAVENOUS AS NEEDED
Status: CANCELLED | OUTPATIENT
Start: 2019-06-28

## 2019-06-28 RX ORDER — DIPHENHYDRAMINE HYDROCHLORIDE 50 MG/ML
50 INJECTION INTRAMUSCULAR; INTRAVENOUS AS NEEDED
Status: CANCELLED | OUTPATIENT
Start: 2019-06-28

## 2019-06-28 RX ORDER — SODIUM CHLORIDE 9 MG/ML
250 INJECTION, SOLUTION INTRAVENOUS ONCE
Status: CANCELLED | OUTPATIENT
Start: 2019-06-28

## 2019-06-28 RX ORDER — SODIUM CHLORIDE 9 MG/ML
250 INJECTION, SOLUTION INTRAVENOUS ONCE
Status: COMPLETED | OUTPATIENT
Start: 2019-06-28 | End: 2019-06-28

## 2019-06-28 RX ORDER — SODIUM CHLORIDE 0.9 % (FLUSH) 0.9 %
10 SYRINGE (ML) INJECTION AS NEEDED
Status: CANCELLED | OUTPATIENT
Start: 2019-06-28

## 2019-06-28 RX ORDER — FAMOTIDINE 10 MG/ML
20 INJECTION, SOLUTION INTRAVENOUS ONCE
Status: COMPLETED | OUTPATIENT
Start: 2019-06-28 | End: 2019-06-28

## 2019-06-28 RX ADMIN — SODIUM CHLORIDE, PRESERVATIVE FREE 10 ML: 5 INJECTION INTRAVENOUS at 10:41

## 2019-06-28 RX ADMIN — FAMOTIDINE 20 MG: 10 INJECTION INTRAVENOUS at 08:52

## 2019-06-28 RX ADMIN — DEXAMETHASONE SODIUM PHOSPHATE 12 MG: 10 INJECTION, SOLUTION INTRAMUSCULAR; INTRAVENOUS at 08:53

## 2019-06-28 RX ADMIN — SODIUM CHLORIDE 250 ML: 9 INJECTION, SOLUTION INTRAVENOUS at 08:44

## 2019-06-28 RX ADMIN — Medication 500 UNITS: at 10:41

## 2019-06-28 RX ADMIN — PACLITAXEL 140 MG: 6 INJECTION, SOLUTION INTRAVENOUS at 09:40

## 2019-07-05 ENCOUNTER — INFUSION (OUTPATIENT)
Dept: ONCOLOGY | Facility: HOSPITAL | Age: 59
End: 2019-07-05

## 2019-07-05 VITALS
DIASTOLIC BLOOD PRESSURE: 96 MMHG | WEIGHT: 158 LBS | TEMPERATURE: 98 F | BODY MASS INDEX: 27.14 KG/M2 | SYSTOLIC BLOOD PRESSURE: 132 MMHG | HEART RATE: 65 BPM

## 2019-07-05 DIAGNOSIS — Z17.0 MALIGNANT NEOPLASM OF OVERLAPPING SITES OF LEFT BREAST IN FEMALE, ESTROGEN RECEPTOR POSITIVE (HCC): Primary | ICD-10-CM

## 2019-07-05 DIAGNOSIS — C50.812 MALIGNANT NEOPLASM OF OVERLAPPING SITES OF LEFT BREAST IN FEMALE, ESTROGEN RECEPTOR POSITIVE (HCC): Primary | ICD-10-CM

## 2019-07-05 LAB
ALBUMIN SERPL-MCNC: 4.1 G/DL (ref 3.5–5.2)
ALBUMIN/GLOB SERPL: 2 G/DL (ref 1.1–2.4)
ALP SERPL-CCNC: 74 U/L (ref 38–116)
ALT SERPL W P-5'-P-CCNC: 25 U/L (ref 0–33)
ANION GAP SERPL CALCULATED.3IONS-SCNC: 9.7 MMOL/L (ref 5–15)
AST SERPL-CCNC: 25 U/L (ref 0–32)
BASOPHILS # BLD AUTO: 0.02 10*3/MM3 (ref 0–0.2)
BASOPHILS NFR BLD AUTO: 0.5 % (ref 0–1.5)
BILIRUB SERPL-MCNC: 0.2 MG/DL (ref 0.2–1.2)
BUN BLD-MCNC: 24 MG/DL (ref 6–20)
BUN/CREAT SERPL: 32.4 (ref 7.3–30)
CALCIUM SPEC-SCNC: 9.2 MG/DL (ref 8.5–10.2)
CHLORIDE SERPL-SCNC: 105 MMOL/L (ref 98–107)
CO2 SERPL-SCNC: 26.3 MMOL/L (ref 22–29)
CREAT BLD-MCNC: 0.74 MG/DL (ref 0.6–1.1)
DEPRECATED RDW RBC AUTO: 56.2 FL (ref 37–54)
EOSINOPHIL # BLD AUTO: 0.12 10*3/MM3 (ref 0–0.4)
EOSINOPHIL NFR BLD AUTO: 3 % (ref 0.3–6.2)
ERYTHROCYTE [DISTWIDTH] IN BLOOD BY AUTOMATED COUNT: 15.9 % (ref 12.3–15.4)
GFR SERPL CREATININE-BSD FRML MDRD: 80 ML/MIN/1.73
GLOBULIN UR ELPH-MCNC: 2.1 GM/DL (ref 1.8–3.5)
GLUCOSE BLD-MCNC: 108 MG/DL (ref 74–124)
HCT VFR BLD AUTO: 33.6 % (ref 34–46.6)
HGB BLD-MCNC: 10.7 G/DL (ref 12–15.9)
IMM GRANULOCYTES # BLD AUTO: 0.02 10*3/MM3 (ref 0–0.05)
IMM GRANULOCYTES NFR BLD AUTO: 0.5 % (ref 0–0.5)
LYMPHOCYTES # BLD AUTO: 0.76 10*3/MM3 (ref 0.7–3.1)
LYMPHOCYTES NFR BLD AUTO: 18.9 % (ref 19.6–45.3)
MCH RBC QN AUTO: 31 PG (ref 26.6–33)
MCHC RBC AUTO-ENTMCNC: 31.8 G/DL (ref 31.5–35.7)
MCV RBC AUTO: 97.4 FL (ref 79–97)
MONOCYTES # BLD AUTO: 0.28 10*3/MM3 (ref 0.1–0.9)
MONOCYTES NFR BLD AUTO: 6.9 % (ref 5–12)
NEUTROPHILS # BLD AUTO: 2.83 10*3/MM3 (ref 1.7–7)
NEUTROPHILS NFR BLD AUTO: 70.2 % (ref 42.7–76)
NRBC BLD AUTO-RTO: 0 /100 WBC (ref 0–0.2)
PLATELET # BLD AUTO: 245 10*3/MM3 (ref 140–450)
PMV BLD AUTO: 8.6 FL (ref 6–12)
POTASSIUM BLD-SCNC: 4.1 MMOL/L (ref 3.5–4.7)
PROT SERPL-MCNC: 6.2 G/DL (ref 6.3–8)
RBC # BLD AUTO: 3.45 10*6/MM3 (ref 3.77–5.28)
SODIUM BLD-SCNC: 141 MMOL/L (ref 134–145)
WBC NRBC COR # BLD: 4.03 10*3/MM3 (ref 3.4–10.8)

## 2019-07-05 PROCEDURE — 96413 CHEMO IV INFUSION 1 HR: CPT | Performed by: NURSE PRACTITIONER

## 2019-07-05 PROCEDURE — 85025 COMPLETE CBC W/AUTO DIFF WBC: CPT | Performed by: NURSE PRACTITIONER

## 2019-07-05 PROCEDURE — 80053 COMPREHEN METABOLIC PANEL: CPT | Performed by: NURSE PRACTITIONER

## 2019-07-05 PROCEDURE — 25010000002 PACLITAXEL PER 30 MG: Performed by: NURSE PRACTITIONER

## 2019-07-05 PROCEDURE — 25010000002 DEXAMETHASONE SODIUM PHOSPHATE 100 MG/10ML SOLUTION: Performed by: NURSE PRACTITIONER

## 2019-07-05 PROCEDURE — 96375 TX/PRO/DX INJ NEW DRUG ADDON: CPT | Performed by: NURSE PRACTITIONER

## 2019-07-05 RX ORDER — DIPHENHYDRAMINE HYDROCHLORIDE 50 MG/ML
50 INJECTION INTRAMUSCULAR; INTRAVENOUS AS NEEDED
Status: CANCELLED | OUTPATIENT
Start: 2019-07-05

## 2019-07-05 RX ORDER — SODIUM CHLORIDE 9 MG/ML
250 INJECTION, SOLUTION INTRAVENOUS ONCE
Status: COMPLETED | OUTPATIENT
Start: 2019-07-05 | End: 2019-07-05

## 2019-07-05 RX ORDER — FAMOTIDINE 10 MG/ML
20 INJECTION, SOLUTION INTRAVENOUS ONCE
Status: COMPLETED | OUTPATIENT
Start: 2019-07-05 | End: 2019-07-05

## 2019-07-05 RX ORDER — FAMOTIDINE 10 MG/ML
20 INJECTION, SOLUTION INTRAVENOUS AS NEEDED
Status: CANCELLED | OUTPATIENT
Start: 2019-07-05

## 2019-07-05 RX ADMIN — PACLITAXEL 140 MG: 6 INJECTION, SOLUTION INTRAVENOUS at 13:43

## 2019-07-05 RX ADMIN — FAMOTIDINE 20 MG: 10 INJECTION, SOLUTION INTRAVENOUS at 12:56

## 2019-07-05 RX ADMIN — SODIUM CHLORIDE 250 ML: 9 INJECTION, SOLUTION INTRAVENOUS at 12:56

## 2019-07-05 RX ADMIN — DEXAMETHASONE SODIUM PHOSPHATE 12 MG: 10 INJECTION, SOLUTION INTRAMUSCULAR; INTRAVENOUS at 12:59

## 2019-07-10 RX ORDER — FAMOTIDINE 10 MG/ML
20 INJECTION, SOLUTION INTRAVENOUS AS NEEDED
Status: CANCELLED | OUTPATIENT
Start: 2019-07-12

## 2019-07-10 RX ORDER — SODIUM CHLORIDE 9 MG/ML
250 INJECTION, SOLUTION INTRAVENOUS ONCE
Status: CANCELLED | OUTPATIENT
Start: 2019-07-12

## 2019-07-10 RX ORDER — FAMOTIDINE 10 MG/ML
20 INJECTION, SOLUTION INTRAVENOUS ONCE
Status: CANCELLED | OUTPATIENT
Start: 2019-07-12

## 2019-07-10 RX ORDER — DIPHENHYDRAMINE HYDROCHLORIDE 50 MG/ML
50 INJECTION INTRAMUSCULAR; INTRAVENOUS AS NEEDED
Status: CANCELLED | OUTPATIENT
Start: 2019-07-12

## 2019-07-12 ENCOUNTER — INFUSION (OUTPATIENT)
Dept: ONCOLOGY | Facility: HOSPITAL | Age: 59
End: 2019-07-12

## 2019-07-12 VITALS
BODY MASS INDEX: 27.31 KG/M2 | WEIGHT: 159 LBS | SYSTOLIC BLOOD PRESSURE: 160 MMHG | TEMPERATURE: 98.5 F | HEART RATE: 96 BPM | OXYGEN SATURATION: 97 % | DIASTOLIC BLOOD PRESSURE: 90 MMHG

## 2019-07-12 DIAGNOSIS — Z17.0 MALIGNANT NEOPLASM OF OVERLAPPING SITES OF LEFT BREAST IN FEMALE, ESTROGEN RECEPTOR POSITIVE (HCC): Primary | ICD-10-CM

## 2019-07-12 DIAGNOSIS — C50.812 MALIGNANT NEOPLASM OF OVERLAPPING SITES OF LEFT BREAST IN FEMALE, ESTROGEN RECEPTOR POSITIVE (HCC): Primary | ICD-10-CM

## 2019-07-12 LAB
ALBUMIN SERPL-MCNC: 4.3 G/DL (ref 3.5–5.2)
ALBUMIN/GLOB SERPL: 2.2 G/DL (ref 1.1–2.4)
ALP SERPL-CCNC: 74 U/L (ref 38–116)
ALT SERPL W P-5'-P-CCNC: 23 U/L (ref 0–33)
ANION GAP SERPL CALCULATED.3IONS-SCNC: 10.8 MMOL/L (ref 5–15)
AST SERPL-CCNC: 22 U/L (ref 0–32)
BASOPHILS # BLD AUTO: 0.04 10*3/MM3 (ref 0–0.2)
BASOPHILS NFR BLD AUTO: 1 % (ref 0–1.5)
BILIRUB SERPL-MCNC: 0.3 MG/DL (ref 0.2–1.2)
BUN BLD-MCNC: 24 MG/DL (ref 6–20)
BUN/CREAT SERPL: 25.3 (ref 7.3–30)
CALCIUM SPEC-SCNC: 9.4 MG/DL (ref 8.5–10.2)
CHLORIDE SERPL-SCNC: 103 MMOL/L (ref 98–107)
CO2 SERPL-SCNC: 26.2 MMOL/L (ref 22–29)
CREAT BLD-MCNC: 0.95 MG/DL (ref 0.6–1.1)
DEPRECATED RDW RBC AUTO: 55.1 FL (ref 37–54)
EOSINOPHIL # BLD AUTO: 0.13 10*3/MM3 (ref 0–0.4)
EOSINOPHIL NFR BLD AUTO: 3.1 % (ref 0.3–6.2)
ERYTHROCYTE [DISTWIDTH] IN BLOOD BY AUTOMATED COUNT: 15.5 % (ref 12.3–15.4)
GFR SERPL CREATININE-BSD FRML MDRD: 60 ML/MIN/1.73
GLOBULIN UR ELPH-MCNC: 2 GM/DL (ref 1.8–3.5)
GLUCOSE BLD-MCNC: 131 MG/DL (ref 74–124)
HCT VFR BLD AUTO: 35.7 % (ref 34–46.6)
HGB BLD-MCNC: 11.1 G/DL (ref 12–15.9)
IMM GRANULOCYTES # BLD AUTO: 0.03 10*3/MM3 (ref 0–0.05)
IMM GRANULOCYTES NFR BLD AUTO: 0.7 % (ref 0–0.5)
LYMPHOCYTES # BLD AUTO: 0.72 10*3/MM3 (ref 0.7–3.1)
LYMPHOCYTES NFR BLD AUTO: 17.2 % (ref 19.6–45.3)
MCH RBC QN AUTO: 30.2 PG (ref 26.6–33)
MCHC RBC AUTO-ENTMCNC: 31.1 G/DL (ref 31.5–35.7)
MCV RBC AUTO: 97.3 FL (ref 79–97)
MONOCYTES # BLD AUTO: 0.32 10*3/MM3 (ref 0.1–0.9)
MONOCYTES NFR BLD AUTO: 7.7 % (ref 5–12)
NEUTROPHILS # BLD AUTO: 2.94 10*3/MM3 (ref 1.7–7)
NEUTROPHILS NFR BLD AUTO: 70.3 % (ref 42.7–76)
NRBC BLD AUTO-RTO: 0 /100 WBC (ref 0–0.2)
PLATELET # BLD AUTO: 218 10*3/MM3 (ref 140–450)
PMV BLD AUTO: 8.6 FL (ref 6–12)
POTASSIUM BLD-SCNC: 4.1 MMOL/L (ref 3.5–4.7)
PROT SERPL-MCNC: 6.3 G/DL (ref 6.3–8)
RBC # BLD AUTO: 3.67 10*6/MM3 (ref 3.77–5.28)
SODIUM BLD-SCNC: 140 MMOL/L (ref 134–145)
WBC NRBC COR # BLD: 4.18 10*3/MM3 (ref 3.4–10.8)

## 2019-07-12 PROCEDURE — 80053 COMPREHEN METABOLIC PANEL: CPT | Performed by: NURSE PRACTITIONER

## 2019-07-12 PROCEDURE — 96413 CHEMO IV INFUSION 1 HR: CPT | Performed by: NURSE PRACTITIONER

## 2019-07-12 PROCEDURE — 25010000002 DEXAMETHASONE SODIUM PHOSPHATE 100 MG/10ML SOLUTION: Performed by: NURSE PRACTITIONER

## 2019-07-12 PROCEDURE — 85025 COMPLETE CBC W/AUTO DIFF WBC: CPT | Performed by: NURSE PRACTITIONER

## 2019-07-12 PROCEDURE — 25010000002 PACLITAXEL PER 30 MG: Performed by: NURSE PRACTITIONER

## 2019-07-12 PROCEDURE — 96375 TX/PRO/DX INJ NEW DRUG ADDON: CPT | Performed by: NURSE PRACTITIONER

## 2019-07-12 RX ORDER — SODIUM CHLORIDE 9 MG/ML
250 INJECTION, SOLUTION INTRAVENOUS ONCE
Status: COMPLETED | OUTPATIENT
Start: 2019-07-12 | End: 2019-07-12

## 2019-07-12 RX ORDER — FAMOTIDINE 10 MG/ML
20 INJECTION, SOLUTION INTRAVENOUS ONCE
Status: COMPLETED | OUTPATIENT
Start: 2019-07-12 | End: 2019-07-12

## 2019-07-12 RX ADMIN — DEXAMETHASONE SODIUM PHOSPHATE 12 MG: 10 INJECTION, SOLUTION INTRAMUSCULAR; INTRAVENOUS at 09:52

## 2019-07-12 RX ADMIN — FAMOTIDINE 20 MG: 10 INJECTION, SOLUTION INTRAVENOUS at 09:52

## 2019-07-12 RX ADMIN — SODIUM CHLORIDE 250 ML: 9 INJECTION, SOLUTION INTRAVENOUS at 09:52

## 2019-07-12 RX ADMIN — PACLITAXEL 140 MG: 6 INJECTION, SOLUTION INTRAVENOUS at 10:42

## 2019-07-17 RX ORDER — FAMOTIDINE 10 MG/ML
20 INJECTION, SOLUTION INTRAVENOUS ONCE
Status: CANCELLED | OUTPATIENT
Start: 2019-07-19

## 2019-07-17 RX ORDER — FAMOTIDINE 10 MG/ML
20 INJECTION, SOLUTION INTRAVENOUS AS NEEDED
Status: CANCELLED | OUTPATIENT
Start: 2019-07-19

## 2019-07-17 RX ORDER — SODIUM CHLORIDE 9 MG/ML
250 INJECTION, SOLUTION INTRAVENOUS ONCE
Status: CANCELLED | OUTPATIENT
Start: 2019-07-19

## 2019-07-17 RX ORDER — DIPHENHYDRAMINE HYDROCHLORIDE 50 MG/ML
50 INJECTION INTRAMUSCULAR; INTRAVENOUS AS NEEDED
Status: CANCELLED | OUTPATIENT
Start: 2019-07-19

## 2019-07-19 ENCOUNTER — INFUSION (OUTPATIENT)
Dept: ONCOLOGY | Facility: HOSPITAL | Age: 59
End: 2019-07-19

## 2019-07-19 VITALS
SYSTOLIC BLOOD PRESSURE: 133 MMHG | WEIGHT: 157.6 LBS | HEART RATE: 59 BPM | TEMPERATURE: 98.1 F | BODY MASS INDEX: 27.07 KG/M2 | DIASTOLIC BLOOD PRESSURE: 87 MMHG | OXYGEN SATURATION: 96 % | RESPIRATION RATE: 16 BRPM

## 2019-07-19 DIAGNOSIS — Z17.0 MALIGNANT NEOPLASM OF OVERLAPPING SITES OF LEFT BREAST IN FEMALE, ESTROGEN RECEPTOR POSITIVE (HCC): Primary | ICD-10-CM

## 2019-07-19 DIAGNOSIS — C50.812 MALIGNANT NEOPLASM OF OVERLAPPING SITES OF LEFT BREAST IN FEMALE, ESTROGEN RECEPTOR POSITIVE (HCC): Primary | ICD-10-CM

## 2019-07-19 DIAGNOSIS — Z45.2 FITTING AND ADJUSTMENT OF VASCULAR CATHETER: ICD-10-CM

## 2019-07-19 LAB
ALBUMIN SERPL-MCNC: 4.5 G/DL (ref 3.5–5.2)
ALBUMIN/GLOB SERPL: 2.1 G/DL (ref 1.1–2.4)
ALP SERPL-CCNC: 70 U/L (ref 38–116)
ALT SERPL W P-5'-P-CCNC: 26 U/L (ref 0–33)
ANION GAP SERPL CALCULATED.3IONS-SCNC: 11.2 MMOL/L (ref 5–15)
AST SERPL-CCNC: 24 U/L (ref 0–32)
BASOPHILS # BLD AUTO: 0.04 10*3/MM3 (ref 0–0.2)
BASOPHILS NFR BLD AUTO: 1 % (ref 0–1.5)
BILIRUB SERPL-MCNC: 0.4 MG/DL (ref 0.2–1.2)
BUN BLD-MCNC: 21 MG/DL (ref 6–20)
BUN/CREAT SERPL: 25.6 (ref 7.3–30)
CALCIUM SPEC-SCNC: 9.5 MG/DL (ref 8.5–10.2)
CHLORIDE SERPL-SCNC: 105 MMOL/L (ref 98–107)
CO2 SERPL-SCNC: 25.8 MMOL/L (ref 22–29)
CREAT BLD-MCNC: 0.82 MG/DL (ref 0.6–1.1)
DEPRECATED RDW RBC AUTO: 55.3 FL (ref 37–54)
EOSINOPHIL # BLD AUTO: 0.09 10*3/MM3 (ref 0–0.4)
EOSINOPHIL NFR BLD AUTO: 2.4 % (ref 0.3–6.2)
ERYTHROCYTE [DISTWIDTH] IN BLOOD BY AUTOMATED COUNT: 15.2 % (ref 12.3–15.4)
GFR SERPL CREATININE-BSD FRML MDRD: 71 ML/MIN/1.73
GLOBULIN UR ELPH-MCNC: 2.1 GM/DL (ref 1.8–3.5)
GLUCOSE BLD-MCNC: 112 MG/DL (ref 74–124)
HCT VFR BLD AUTO: 37.5 % (ref 34–46.6)
HGB BLD-MCNC: 11.5 G/DL (ref 12–15.9)
IMM GRANULOCYTES # BLD AUTO: 0.02 10*3/MM3 (ref 0–0.05)
IMM GRANULOCYTES NFR BLD AUTO: 0.5 % (ref 0–0.5)
LYMPHOCYTES # BLD AUTO: 0.76 10*3/MM3 (ref 0.7–3.1)
LYMPHOCYTES NFR BLD AUTO: 19.9 % (ref 19.6–45.3)
MCH RBC QN AUTO: 30.3 PG (ref 26.6–33)
MCHC RBC AUTO-ENTMCNC: 30.7 G/DL (ref 31.5–35.7)
MCV RBC AUTO: 98.9 FL (ref 79–97)
MONOCYTES # BLD AUTO: 0.33 10*3/MM3 (ref 0.1–0.9)
MONOCYTES NFR BLD AUTO: 8.6 % (ref 5–12)
NEUTROPHILS # BLD AUTO: 2.58 10*3/MM3 (ref 1.7–7)
NEUTROPHILS NFR BLD AUTO: 67.6 % (ref 42.7–76)
NRBC BLD AUTO-RTO: 0 /100 WBC (ref 0–0.2)
PLATELET # BLD AUTO: 222 10*3/MM3 (ref 140–450)
PMV BLD AUTO: 8.4 FL (ref 6–12)
POTASSIUM BLD-SCNC: 4 MMOL/L (ref 3.5–4.7)
PROT SERPL-MCNC: 6.6 G/DL (ref 6.3–8)
RBC # BLD AUTO: 3.79 10*6/MM3 (ref 3.77–5.28)
SODIUM BLD-SCNC: 142 MMOL/L (ref 134–145)
WBC NRBC COR # BLD: 3.82 10*3/MM3 (ref 3.4–10.8)

## 2019-07-19 PROCEDURE — 25010000002 DEXAMETHASONE SODIUM PHOSPHATE 100 MG/10ML SOLUTION: Performed by: INTERNAL MEDICINE

## 2019-07-19 PROCEDURE — 85025 COMPLETE CBC W/AUTO DIFF WBC: CPT | Performed by: INTERNAL MEDICINE

## 2019-07-19 PROCEDURE — 96375 TX/PRO/DX INJ NEW DRUG ADDON: CPT | Performed by: INTERNAL MEDICINE

## 2019-07-19 PROCEDURE — 96413 CHEMO IV INFUSION 1 HR: CPT | Performed by: INTERNAL MEDICINE

## 2019-07-19 PROCEDURE — 25010000002 PACLITAXEL PER 30 MG: Performed by: INTERNAL MEDICINE

## 2019-07-19 PROCEDURE — 80053 COMPREHEN METABOLIC PANEL: CPT | Performed by: INTERNAL MEDICINE

## 2019-07-19 RX ORDER — PREDNISONE 20 MG/1
20 TABLET ORAL DAILY
Qty: 3 TABLET | Refills: 0 | Status: SHIPPED | OUTPATIENT
Start: 2019-07-19 | End: 2019-07-26

## 2019-07-19 RX ORDER — SODIUM CHLORIDE 9 MG/ML
250 INJECTION, SOLUTION INTRAVENOUS ONCE
Status: COMPLETED | OUTPATIENT
Start: 2019-07-19 | End: 2019-07-19

## 2019-07-19 RX ORDER — SODIUM CHLORIDE 0.9 % (FLUSH) 0.9 %
10 SYRINGE (ML) INJECTION AS NEEDED
Status: DISCONTINUED | OUTPATIENT
Start: 2019-07-19 | End: 2019-07-19 | Stop reason: HOSPADM

## 2019-07-19 RX ORDER — FAMOTIDINE 10 MG/ML
20 INJECTION, SOLUTION INTRAVENOUS ONCE
Status: COMPLETED | OUTPATIENT
Start: 2019-07-19 | End: 2019-07-19

## 2019-07-19 RX ORDER — SODIUM CHLORIDE 0.9 % (FLUSH) 0.9 %
10 SYRINGE (ML) INJECTION AS NEEDED
Status: CANCELLED | OUTPATIENT
Start: 2019-07-19

## 2019-07-19 RX ADMIN — DEXAMETHASONE SODIUM PHOSPHATE 12 MG: 10 INJECTION, SOLUTION INTRAMUSCULAR; INTRAVENOUS at 09:40

## 2019-07-19 RX ADMIN — Medication 500 UNITS: at 11:30

## 2019-07-19 RX ADMIN — FAMOTIDINE 20 MG: 10 INJECTION INTRAVENOUS at 09:39

## 2019-07-19 RX ADMIN — SODIUM CHLORIDE 250 ML: 9 INJECTION, SOLUTION INTRAVENOUS at 09:34

## 2019-07-19 RX ADMIN — PACLITAXEL 140 MG: 6 INJECTION, SOLUTION INTRAVENOUS at 10:29

## 2019-07-19 NOTE — PROGRESS NOTES
PER DR. JOSUE PT IS TO ONLY DO 12 CYCLES OF TAXOL. TODAY WOULD BE THE LAST ONE. APPT DESK MESSAGED TO [facility] FOR NEXT WK AND THAT THEY MIGHT WANT TO CHECK W/ LIAT TO SEE IF HER MD APPT SHOULD BE MOVED OUT. PT AWARE.    ALSO PER DR. JOSUE PT NEEDS TO TAKE PREDNISONE 20MG DAILY X 3 DAYS STARTING TOMORROW. RX SENT TO PT'S PHARMACY VIA E-SCRIBE. PT AWARE.

## 2019-07-26 ENCOUNTER — INFUSION (OUTPATIENT)
Dept: ONCOLOGY | Facility: HOSPITAL | Age: 59
End: 2019-07-26

## 2019-07-26 ENCOUNTER — APPOINTMENT (OUTPATIENT)
Dept: ONCOLOGY | Facility: HOSPITAL | Age: 59
End: 2019-07-26

## 2019-07-26 ENCOUNTER — OFFICE VISIT (OUTPATIENT)
Dept: ONCOLOGY | Facility: CLINIC | Age: 59
End: 2019-07-26

## 2019-07-26 VITALS
DIASTOLIC BLOOD PRESSURE: 81 MMHG | WEIGHT: 159.9 LBS | RESPIRATION RATE: 12 BRPM | TEMPERATURE: 98.5 F | HEART RATE: 70 BPM | SYSTOLIC BLOOD PRESSURE: 125 MMHG | HEIGHT: 64 IN | OXYGEN SATURATION: 99 % | BODY MASS INDEX: 27.3 KG/M2

## 2019-07-26 DIAGNOSIS — C50.812 MALIGNANT NEOPLASM OF OVERLAPPING SITES OF LEFT FEMALE BREAST, UNSPECIFIED ESTROGEN RECEPTOR STATUS (HCC): ICD-10-CM

## 2019-07-26 DIAGNOSIS — D63.0 ANEMIA IN NEOPLASTIC DISEASE: ICD-10-CM

## 2019-07-26 DIAGNOSIS — C50.812 MALIGNANT NEOPLASM OF OVERLAPPING SITES OF LEFT BREAST IN FEMALE, ESTROGEN RECEPTOR POSITIVE (HCC): Primary | ICD-10-CM

## 2019-07-26 DIAGNOSIS — I87.8 POOR VENOUS ACCESS: ICD-10-CM

## 2019-07-26 DIAGNOSIS — Z45.2 FITTING AND ADJUSTMENT OF VASCULAR CATHETER: ICD-10-CM

## 2019-07-26 DIAGNOSIS — R22.31 AXILLARY MASS, RIGHT: ICD-10-CM

## 2019-07-26 DIAGNOSIS — Z45.2 FITTING AND ADJUSTMENT OF VASCULAR CATHETER: Primary | ICD-10-CM

## 2019-07-26 DIAGNOSIS — Z80.3 FAMILY HISTORY OF BREAST CANCER IN FEMALE: ICD-10-CM

## 2019-07-26 DIAGNOSIS — Z17.0 MALIGNANT NEOPLASM OF OVERLAPPING SITES OF LEFT BREAST IN FEMALE, ESTROGEN RECEPTOR POSITIVE (HCC): ICD-10-CM

## 2019-07-26 DIAGNOSIS — Z17.0 MALIGNANT NEOPLASM OF OVERLAPPING SITES OF LEFT BREAST IN FEMALE, ESTROGEN RECEPTOR POSITIVE (HCC): Primary | ICD-10-CM

## 2019-07-26 DIAGNOSIS — C50.812 MALIGNANT NEOPLASM OF OVERLAPPING SITES OF LEFT BREAST IN FEMALE, ESTROGEN RECEPTOR POSITIVE (HCC): ICD-10-CM

## 2019-07-26 LAB
ALBUMIN SERPL-MCNC: 4.3 G/DL (ref 3.5–5.2)
ALBUMIN/GLOB SERPL: 1.8 G/DL (ref 1.1–2.4)
ALP SERPL-CCNC: 71 U/L (ref 38–116)
ALT SERPL W P-5'-P-CCNC: 27 U/L (ref 0–33)
ANION GAP SERPL CALCULATED.3IONS-SCNC: 12.1 MMOL/L (ref 5–15)
AST SERPL-CCNC: 26 U/L (ref 0–32)
BASOPHILS # BLD AUTO: 0.03 10*3/MM3 (ref 0–0.2)
BASOPHILS NFR BLD AUTO: 0.7 % (ref 0–1.5)
BILIRUB SERPL-MCNC: 0.4 MG/DL (ref 0.2–1.2)
BUN BLD-MCNC: 19 MG/DL (ref 6–20)
BUN/CREAT SERPL: 23.8 (ref 7.3–30)
CALCIUM SPEC-SCNC: 9.4 MG/DL (ref 8.5–10.2)
CHLORIDE SERPL-SCNC: 104 MMOL/L (ref 98–107)
CO2 SERPL-SCNC: 24.9 MMOL/L (ref 22–29)
CREAT BLD-MCNC: 0.8 MG/DL (ref 0.6–1.1)
DEPRECATED RDW RBC AUTO: 55.7 FL (ref 37–54)
EOSINOPHIL # BLD AUTO: 0.1 10*3/MM3 (ref 0–0.4)
EOSINOPHIL NFR BLD AUTO: 2.4 % (ref 0.3–6.2)
ERYTHROCYTE [DISTWIDTH] IN BLOOD BY AUTOMATED COUNT: 15.7 % (ref 12.3–15.4)
GFR SERPL CREATININE-BSD FRML MDRD: 73 ML/MIN/1.73
GLOBULIN UR ELPH-MCNC: 2.4 GM/DL (ref 1.8–3.5)
GLUCOSE BLD-MCNC: 129 MG/DL (ref 74–124)
HCT VFR BLD AUTO: 36 % (ref 34–46.6)
HGB BLD-MCNC: 11.3 G/DL (ref 12–15.9)
IMM GRANULOCYTES # BLD AUTO: 0.02 10*3/MM3 (ref 0–0.05)
IMM GRANULOCYTES NFR BLD AUTO: 0.5 % (ref 0–0.5)
LYMPHOCYTES # BLD AUTO: 0.68 10*3/MM3 (ref 0.7–3.1)
LYMPHOCYTES NFR BLD AUTO: 16 % (ref 19.6–45.3)
MCH RBC QN AUTO: 30.5 PG (ref 26.6–33)
MCHC RBC AUTO-ENTMCNC: 31.4 G/DL (ref 31.5–35.7)
MCV RBC AUTO: 97.3 FL (ref 79–97)
MONOCYTES # BLD AUTO: 0.3 10*3/MM3 (ref 0.1–0.9)
MONOCYTES NFR BLD AUTO: 7.1 % (ref 5–12)
NEUTROPHILS # BLD AUTO: 3.11 10*3/MM3 (ref 1.7–7)
NEUTROPHILS NFR BLD AUTO: 73.3 % (ref 42.7–76)
NRBC BLD AUTO-RTO: 0 /100 WBC (ref 0–0.2)
PLATELET # BLD AUTO: 259 10*3/MM3 (ref 140–450)
PMV BLD AUTO: 9.1 FL (ref 6–12)
POTASSIUM BLD-SCNC: 4.3 MMOL/L (ref 3.5–4.7)
PROT SERPL-MCNC: 6.7 G/DL (ref 6.3–8)
RBC # BLD AUTO: 3.7 10*6/MM3 (ref 3.77–5.28)
SODIUM BLD-SCNC: 141 MMOL/L (ref 134–145)
WBC NRBC COR # BLD: 4.24 10*3/MM3 (ref 3.4–10.8)

## 2019-07-26 PROCEDURE — 96523 IRRIG DRUG DELIVERY DEVICE: CPT | Performed by: INTERNAL MEDICINE

## 2019-07-26 PROCEDURE — 85025 COMPLETE CBC W/AUTO DIFF WBC: CPT | Performed by: INTERNAL MEDICINE

## 2019-07-26 PROCEDURE — 80053 COMPREHEN METABOLIC PANEL: CPT | Performed by: INTERNAL MEDICINE

## 2019-07-26 PROCEDURE — 99215 OFFICE O/P EST HI 40 MIN: CPT | Performed by: INTERNAL MEDICINE

## 2019-07-26 RX ORDER — SODIUM CHLORIDE 0.9 % (FLUSH) 0.9 %
10 SYRINGE (ML) INJECTION AS NEEDED
Status: CANCELLED | OUTPATIENT
Start: 2019-07-26

## 2019-07-26 RX ORDER — SODIUM CHLORIDE 0.9 % (FLUSH) 0.9 %
10 SYRINGE (ML) INJECTION AS NEEDED
Status: DISCONTINUED | OUTPATIENT
Start: 2019-07-26 | End: 2019-07-26 | Stop reason: HOSPADM

## 2019-07-26 RX ORDER — LETROZOLE 2.5 MG/1
2.5 TABLET, FILM COATED ORAL DAILY
Qty: 30 TABLET | Refills: 6 | Status: SHIPPED | OUTPATIENT
Start: 2019-07-26 | End: 2020-02-12 | Stop reason: SDUPTHER

## 2019-07-26 RX ADMIN — SODIUM CHLORIDE, PRESERVATIVE FREE 10 ML: 5 INJECTION INTRAVENOUS at 09:57

## 2019-07-26 RX ADMIN — Medication 500 UNITS: at 09:57

## 2019-07-26 NOTE — PROGRESS NOTES
Subjective     REASON FOR FOLLOW UP:  1. 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 R BREAST MASS WITH GIANT R AXILLARY ADENOPATHY.  3. FAMILY HISTORY OF BREAST CANCER IN MOTHER AND SISTER, SISTER WAS TESTED FOR BRCA AND WAS NEGATIVE.                                       History of Present Illness   This patient returns today to the office in company of her brother after she has completed her 12 rounds of Taxol in the background of treatment of her breast cancer. The patient has not developed any peripheral neuropathy and she is starting to recover blood counts. Her hair is growing back. The patient feels fantastic. Her appetite and taste are good. Her bowel function and urination are normal. No cardiovascular or respiratory issues at this time. Still pain in the right hip from time to time depending on what she is doing and how she is working. The patient otherwise has no other new problems. She has not had any further bleeding from the left breast and the huge mass that was replacing the whole breast and making this to be 3 times the size has disappeared altogether. The patient's right axillary mass has disappeared as well. She has not developed any other masses on any level and she has no other neurological symptomatology.                               Past Medical History:   Diagnosis Date   • Arthritis    • Breast cancer (CMS/HCC)     Left   • Hip pain     RIGHT HIP... CYST   • History of fracture of leg 1987   • Skin sore     OPEN SORE LEFT BREAST           Past Surgical History:   Procedure Laterality Date   • FRACTURE SURGERY  1987    Leg   • HARDWARE REMOVAL     • VENOUS ACCESS DEVICE (PORT) INSERTION Right 2/1/2019    Procedure: INSERTION VENOUS ACCESS DEVICE;  Surgeon: Joby Barron Jr., MD;  Location: [facility] OR [facility];  Service: General        Current Outpatient Medications on File Prior to Visit   Medication Sig Dispense Refill   • diclofenac sodium (VOTAREN XR) 100 MG 24 hr  tablet Take 1 tablet by mouth Daily. 30 tablet 3   • IBUPROFEN PO Take 1 tablet by mouth As Needed.     • metroNIDAZOLE (METROGEL) 1 % gel Apply  topically to the appropriate area as directed Daily. 1 tube 1   • Multiple Vitamin (MULTI VITAMIN DAILY PO) Take  by mouth.     • olopatadine (PATANOL) 0.1 % ophthalmic solution Administer 1 drop to both eyes 2 (Two) Times a Day. 5 mL 5   • ondansetron (ZOFRAN) 8 MG tablet Take 1 tablet by mouth 3 (Three) Times a Day As Needed for Nausea or Vomiting. 30 tablet 5   • oxyCODONE-acetaminophen (PERCOCET) 5-325 MG per tablet 1 q 6 hrs prn pain 20 tablet 0   • predniSONE (DELTASONE) 20 MG tablet Take 1 tablet by mouth Daily. X 3 D FOLLOWING CHEMO FOR BONE PAIN 3 tablet 0   • [DISCONTINUED] acetaminophen (TYLENOL) 500 MG tablet Take 500 mg by mouth Every 6 (Six) Hours As Needed for Mild Pain .       No current facility-administered medications on file prior to visit.         ALLERGIES:    Allergies   Allergen Reactions   • Erythromycin GI Intolerance   • Levaquin [Levofloxacin] GI Intolerance   • Penicillins GI Intolerance        Social History     Socioeconomic History   • Marital status:      Spouse name: Cruz   • Number of children: 0   • Years of education: Not on file   • Highest education level: Not on file   Occupational History   • Occupation:      Employer: SELF-EMPLOYED   Social Needs   • Financial resource strain: Not hard at all   • Food insecurity:     Worry: Never true     Inability: Never true   • Transportation needs:     Medical: No     Non-medical: No   Tobacco Use   • Smoking status: Never Smoker   • Smokeless tobacco: Never Used   Substance and Sexual Activity   • Alcohol use: Yes     Alcohol/week: 4.2 oz     Types: 4 Glasses of wine, 3 Cans of beer per week     Frequency: 2-3 times a week     Drinks per session: 1 or 2     Binge frequency: Never     Comment: OCCASIONALLY, NONE IN LAST TWO DAYS   • Drug use: No   • Sexual activity: Not  Currently     Partners: Male     Birth control/protection: Post-menopausal   Lifestyle   • Physical activity:     Days per week: 7 days     Minutes per session: 120 min   • Stress: Only a little        Family History   Problem Relation Age of Onset   • Lung cancer Father    • Cancer Mother    • Breast cancer Mother    • Liver disease Mother    • Breast cancer Sister 48   • Breast cancer Maternal Grandmother    • Breast cancer Paternal Grandmother    • Breast cancer Maternal Uncle    • Malig Hyperthermia Neg Hx         Review of Systems       General: no fever, no chills, no fatigue,no weight changes, no lack of appetite.  Eyes: no epiphora, xerophthalmia,conjunctivitis, pain, glaucoma, blurred vision, blindness, secretion, photophobia, proptosis, diplopia.  Ears: no otorrhea, tinnitus, otorrhagia, deafness, pain, vertigo.  Nose: no rhinorrhea, no epistaxis, no alteration in perception of odors, no sinuses pressure.  Mouth: no alteration in gums or teeth,  No ulcers, no difficulty with mastication or deglut ion, no odynophagia.  Neck: no masses or pain, no thyroid alterations, no pain in muscles or arteries, no carotid odynia, no crepitation.  Respiratory: no cough, no sputum production,no dyspnea,no trepopnea, no pleuritic pain,no hemoptysis.  Heart: no syncope, no irregularity, no palpitations, no angina,no orthopnea,no paroxysmal nocturnal dyspnea.  Vascular Venous: no tenderness,no edema,no palpable cords,no postphlebitic syndrome, no skin changes no ulcerations.  Vascular Arterial: no distal ischemia, noclaudication, no gangrene, no neuropathic ischemic pain, no skin ulcers, no paleness no cyanosis.  GI: no dysphagia, no odynophagia, no regurgitation, no heartburn,no indigestion,no nausea,no vomiting,no hematemesis ,no melena,no jaundice,no distention, no obstipation,no enterorrhagia,no proctalgia,no anal  lesions, no changes in bowel habits.  : no frequency, no hesitancy, no hematuria, no discharge,no   "pain.  Musculoskeletal: no muscle or tendon pain or inflammation,no  joint pain, no edema, no functional limitation,no fasciculations, no mass.  Neurologic: no headache, no seizures, noalterations on Craneal nerves, no motor deficit, no sensory deficit, normal coordination, no alteration in memory,normal orientation, calculation,normal writting, verbal and written language.  Skin: no rashes,no pruritus no localized lesions.  Psychiatric: no anxiety, no depression,no agitation, no delusions, proper insight.              Objective     Vitals:    07/26/19 1025   BP: 125/81   Pulse: 70   Resp: 12   Temp: 98.5 °F (36.9 °C)   TempSrc: Oral   SpO2: 99%   Weight: 72.5 kg (159 lb 14.4 oz)   Height: 163.5 cm (64.37\")  Comment: new height   PainSc: 0-No pain     Current Status 7/26/2019   ECOG score 0       Physical Exam   GENERAL:  Well-developed, well-nourished  Patient  in no acute distress.   SKIN:  Warm, dry ,NO rashes,NO purpura ,NO petechiae.  HEENT:  Pupils were equal and reactive to light and accomodation, conjunctivas non injected, no pterigion, normal extraocular movements, normal visual acuity.   Mouth mucosa was moist, no exudates in oropharynx, normal gum line, normal roof of the mouth and pillars, normal papillations of the tongue.  NECK:  Supple with good range of motion; no thyromegaly or masses, no JVD or bruits, no cervical adenopathies.No carotid arteries pain, no carotid abnormal pulsation , NO arterial dance.  LYMPHATICS:  No cervical, NO supraclavicular, NO axillary,NO epitrochlear , NO inguinal adenopathy.  CHEST:  Normal excursion of both luz thoraces, normal voice fremitus, no subcutaneous emphysema, normal axillas, no rashes or acanthosis nigricans. Lungs clear to percussion and auscultation, normal breath sounds bilaterally, no wheezing,NO crackles NO ronchi, NO stridor, NO rubs.  CARDIAC AND VASCULAR:  normal rate and regular rhythm, without murmurs,NO rubs NO S3 NO S4 right or left . Normal " femoral, popliteal, pedis, brachial and carotid pulses.Right breast examination disclosed normal nipple, normal skin, no palpable masses, no induration, no areas of tenderness, no peau d'orange, no vein abnormalities. The large mass in the right axilla has disappeared leaving a residual scar formation that is 3 cm long, 1 cm wide, mobile, nontender in the upper aspect and anterior fold of the axilla. The left breast has normalized in size. This breast used to be 3 times the size of what it is now. She had a huge area of necrotic tumor that was bleeding and ulcerated in the outer upper aspect of the left breast. The whole breast was replaced by tumor and the tumor was stuck to the chest wall. This is no longer the case. She has a small area of crust formation, no leftover ulceration and dramatic healing and reduction in the size. There is still a palpable mass behind this that measures close to 4 cm in size. Careful meticulous examination of the left axilla discloses no axillary adenopathies. There is no lymphedema in either extremity.       ABDOMEN:  Soft, nontender with no organomegaly or masses, no ascites, no collateral circulation,no distention,no Clayton sign, no abdominal pain, no inguinal hernias,SMALL umbilical hernia, no abdominal bruits. Normal bowel sounds.  GENITAL: Not  Performed.  EXTREMITIES  AND SPINE:  No clubbing, cyanosis or edema, no deformities or pain .No kyphosis, scoliosis, deformities or pain in spine, ribs or pelvic bone.  NEUROLOGICAL:  Patient was awake, alert, oriented to time, person and place.                    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 At least for the last 4 years, she has had an in crescendo mass in the left breast that has produced a gigantic tumor that is now close to 25 cm in size and is replacing most of the anatomy of the left breast. There are areas of ulceration necrosis and bleeding and the mass is fixed to the chest wall. She has abundant amount of collateral circulation in the left anterior chest wall and it caught my attention the lack of any left axillary adenopathy. She has no lymphedema in the left upper extremity. In the right breast while in sitting position, no palpable abnormalities are documented. The right breast skin and nipple are normal. When the patient lies flat, there is a palpable mass at 9 o'clock from the nipple that measures probably 4 cm in size, very indistinct in regard to edges and margins. There was no mobility and no areas of tenderness. She has a giant adenopathy in the right axilla that measures close to 9 cm in size, uniform, no fluctuation, nontender, completely fixed to the axillary wall. There is no compromise of the skin.         Since the previous visit the patient has completed all of her plan of chemotherapy neoadjuvantly. She has had a very dramatic response not only to the primary tumor in the left breast, her left breast remind ourselves was 3 times the normal size and had an ulcerated mass that was huge with bleeding and necrosis. This has resolved. She had no left axillary adenopathy but she had an induration in the right breast and most importantly she had almost a 7 cm mass in the right axilla. Since completion of chemotherapy all of these issues have resolved near completely. She has had residual mass behind the  scar in the left breast that measures close to 4 cm, no left axillary adenopathy and near complete resolution of the right axillary adenopathy. No nodularity in the right breast.     The rest of her physical exam is normal.     Her white count, hemoglobin and platelets are normal.    She has not had any leftover neuropathy.     RECOMMENDATIONS: Under the present circumstances I have advised the patient and her brother the followin. Now that she has completed her neoadjuvant chemotherapy the patient will be fine to proceed with definitive hormonal therapy that she will take for the next 10 years in the form of Femara at the dose of 2.5 mg a day. Side-effects of the medicine were discussed including joint pain that is transitory and improves with physical activity as well as osteopenia or osteoporosis aggravation. In preparation for this the patient will have a bone density test. A prescription will be sent to her pharmacy.   2. The patient is going to proceed with a PET scan. This will give us an idea about the status of disease in both breast, both axillas and the rest of her body. In the past she had localized disease into both breasts and right axilla. This will be done in the next few days.   3. The patient was made an appointment to see Dr. Barron her surgeon in order to review the PET scan and thereafter proceed with scheduled bilateral mastectomies and bilateral axillary lymph node removal.    I made her an appointment to come back and see me after the PET scan is done.  4. I went ahead and scheduled a DEXA scan .  5. The patient is no longer requiring any prednisone or Zofran.  6. Her port will be maintained by us with flushing every 6 weeks for the next 3 years.  7. Eventually upon completion of her mastectomy we will review the pathology and define if the patient could be a candidate to have any further therapy depending on the findings.      I discussed all of these facts with the patient and her  brother in detail and she was ready to proceed.     I also asked her to buy a multivitamin skin, hair and nails that could be beneficial in regard to nail growth and hair growth under the present circumstances.

## 2019-07-30 ENCOUNTER — HOSPITAL ENCOUNTER (OUTPATIENT)
Facility: HOSPITAL | Age: 59
Setting detail: OBSERVATION
Discharge: HOME | End: 2019-08-02
Attending: EMERGENCY MEDICINE | Admitting: HOSPITALIST

## 2019-07-30 ENCOUNTER — APPOINTMENT (OUTPATIENT)
Dept: BONE DENSITY | Facility: HOSPITAL | Age: 59
End: 2019-07-30

## 2019-07-30 ENCOUNTER — APPOINTMENT (OUTPATIENT)
Dept: GENERAL RADIOLOGY | Facility: HOSPITAL | Age: 59
End: 2019-07-30

## 2019-07-30 ENCOUNTER — APPOINTMENT (OUTPATIENT)
Dept: CT IMAGING | Facility: HOSPITAL | Age: 59
End: 2019-07-30

## 2019-07-30 DIAGNOSIS — C50.919 MALIGNANT NEOPLASM OF FEMALE BREAST, UNSPECIFIED ESTROGEN RECEPTOR STATUS, UNSPECIFIED LATERALITY, UNSPECIFIED SITE OF BREAST (HCC): ICD-10-CM

## 2019-07-30 DIAGNOSIS — S40.212A ABRASION OF LEFT SHOULDER, INITIAL ENCOUNTER: ICD-10-CM

## 2019-07-30 DIAGNOSIS — R55 SYNCOPE, UNSPECIFIED SYNCOPE TYPE: Primary | ICD-10-CM

## 2019-07-30 DIAGNOSIS — S09.90XA CLOSED HEAD INJURY, INITIAL ENCOUNTER: ICD-10-CM

## 2019-07-30 PROBLEM — Z79.899 IMMUNOCOMPROMISED STATE DUE TO DRUG THERAPY (HCC): Status: ACTIVE | Noted: 2019-07-30

## 2019-07-30 PROBLEM — R11.2 NAUSEA & VOMITING: Status: ACTIVE | Noted: 2019-07-30

## 2019-07-30 PROBLEM — D75.89 MACROCYTOSIS: Status: ACTIVE | Noted: 2019-07-30

## 2019-07-30 PROBLEM — D84.821 IMMUNOCOMPROMISED STATE DUE TO DRUG THERAPY (HCC): Status: ACTIVE | Noted: 2019-07-30

## 2019-07-30 LAB
ALBUMIN SERPL-MCNC: 4.3 G/DL (ref 3.5–5.2)
ALBUMIN/GLOB SERPL: 1.6 G/DL
ALP SERPL-CCNC: 68 U/L (ref 39–117)
ALT SERPL W P-5'-P-CCNC: 46 U/L (ref 1–33)
ANION GAP SERPL CALCULATED.3IONS-SCNC: 12.1 MMOL/L (ref 5–15)
AST SERPL-CCNC: 36 U/L (ref 1–32)
BASOPHILS # BLD AUTO: 0.03 10*3/MM3 (ref 0–0.2)
BASOPHILS NFR BLD AUTO: 0.7 % (ref 0–1.5)
BILIRUB SERPL-MCNC: 0.3 MG/DL (ref 0.2–1.2)
BUN BLD-MCNC: 22 MG/DL (ref 8–23)
BUN/CREAT SERPL: 33.3 (ref 7–25)
CALCIUM SPEC-SCNC: 9.5 MG/DL (ref 8.6–10.5)
CHLORIDE SERPL-SCNC: 105 MMOL/L (ref 98–107)
CO2 SERPL-SCNC: 23.9 MMOL/L (ref 22–29)
CREAT BLD-MCNC: 0.66 MG/DL (ref 0.57–1)
D DIMER PPP FEU-MCNC: 0.75 MCGFEU/ML (ref 0–0.49)
DEPRECATED RDW RBC AUTO: 56.4 FL (ref 37–54)
EOSINOPHIL # BLD AUTO: 0.1 10*3/MM3 (ref 0–0.4)
EOSINOPHIL NFR BLD AUTO: 2.4 % (ref 0.3–6.2)
ERYTHROCYTE [DISTWIDTH] IN BLOOD BY AUTOMATED COUNT: 15.4 % (ref 12.3–15.4)
GFR SERPL CREATININE-BSD FRML MDRD: 91 ML/MIN/1.73
GLOBULIN UR ELPH-MCNC: 2.7 GM/DL
GLUCOSE BLD-MCNC: 134 MG/DL (ref 65–99)
HBA1C MFR BLD: 5.6 % (ref 4.8–5.6)
HCT VFR BLD AUTO: 39.5 % (ref 34–46.6)
HGB BLD-MCNC: 12 G/DL (ref 12–15.9)
IMM GRANULOCYTES # BLD AUTO: 0.03 10*3/MM3 (ref 0–0.05)
IMM GRANULOCYTES NFR BLD AUTO: 0.7 % (ref 0–0.5)
LYMPHOCYTES # BLD AUTO: 0.75 10*3/MM3 (ref 0.7–3.1)
LYMPHOCYTES NFR BLD AUTO: 18.3 % (ref 19.6–45.3)
MCH RBC QN AUTO: 30.2 PG (ref 26.6–33)
MCHC RBC AUTO-ENTMCNC: 30.4 G/DL (ref 31.5–35.7)
MCV RBC AUTO: 99.2 FL (ref 79–97)
MONOCYTES # BLD AUTO: 0.41 10*3/MM3 (ref 0.1–0.9)
MONOCYTES NFR BLD AUTO: 10 % (ref 5–12)
NEUTROPHILS # BLD AUTO: 2.78 10*3/MM3 (ref 1.7–7)
NEUTROPHILS NFR BLD AUTO: 67.9 % (ref 42.7–76)
NRBC BLD AUTO-RTO: 0 /100 WBC (ref 0–0.2)
NT-PROBNP SERPL-MCNC: 16.2 PG/ML (ref 5–900)
PLATELET # BLD AUTO: 261 10*3/MM3 (ref 140–450)
PMV BLD AUTO: 8.9 FL (ref 6–12)
POTASSIUM BLD-SCNC: 4.2 MMOL/L (ref 3.5–5.2)
PROT SERPL-MCNC: 7 G/DL (ref 6–8.5)
RBC # BLD AUTO: 3.98 10*6/MM3 (ref 3.77–5.28)
SODIUM BLD-SCNC: 141 MMOL/L (ref 136–145)
TROPONIN T SERPL-MCNC: <0.01 NG/ML (ref 0–0.03)
TSH SERPL DL<=0.05 MIU/L-ACNC: 1.37 MIU/ML (ref 0.27–4.2)
WBC NRBC COR # BLD: 4.1 10*3/MM3 (ref 3.4–10.8)

## 2019-07-30 PROCEDURE — 71046 X-RAY EXAM CHEST 2 VIEWS: CPT

## 2019-07-30 PROCEDURE — 99284 EMERGENCY DEPT VISIT MOD MDM: CPT

## 2019-07-30 PROCEDURE — 80053 COMPREHEN METABOLIC PANEL: CPT | Performed by: EMERGENCY MEDICINE

## 2019-07-30 PROCEDURE — 93010 ELECTROCARDIOGRAM REPORT: CPT | Performed by: INTERNAL MEDICINE

## 2019-07-30 PROCEDURE — G0378 HOSPITAL OBSERVATION PER HR: HCPCS

## 2019-07-30 PROCEDURE — 71275 CT ANGIOGRAPHY CHEST: CPT

## 2019-07-30 PROCEDURE — 93005 ELECTROCARDIOGRAM TRACING: CPT | Performed by: EMERGENCY MEDICINE

## 2019-07-30 PROCEDURE — 85379 FIBRIN DEGRADATION QUANT: CPT | Performed by: EMERGENCY MEDICINE

## 2019-07-30 PROCEDURE — 99244 OFF/OP CNSLTJ NEW/EST MOD 40: CPT | Performed by: INTERNAL MEDICINE

## 2019-07-30 PROCEDURE — 0 IOPAMIDOL PER 1 ML: Performed by: EMERGENCY MEDICINE

## 2019-07-30 PROCEDURE — 83880 ASSAY OF NATRIURETIC PEPTIDE: CPT | Performed by: EMERGENCY MEDICINE

## 2019-07-30 PROCEDURE — 85025 COMPLETE CBC W/AUTO DIFF WBC: CPT | Performed by: EMERGENCY MEDICINE

## 2019-07-30 PROCEDURE — 99214 OFFICE O/P EST MOD 30 MIN: CPT | Performed by: INTERNAL MEDICINE

## 2019-07-30 PROCEDURE — 83036 HEMOGLOBIN GLYCOSYLATED A1C: CPT | Performed by: NURSE PRACTITIONER

## 2019-07-30 PROCEDURE — 84484 ASSAY OF TROPONIN QUANT: CPT | Performed by: EMERGENCY MEDICINE

## 2019-07-30 PROCEDURE — 70450 CT HEAD/BRAIN W/O DYE: CPT

## 2019-07-30 PROCEDURE — 84443 ASSAY THYROID STIM HORMONE: CPT | Performed by: NURSE PRACTITIONER

## 2019-07-30 RX ORDER — OXYCODONE HYDROCHLORIDE AND ACETAMINOPHEN 5; 325 MG/1; MG/1
1 TABLET ORAL EVERY 6 HOURS PRN
Status: DISCONTINUED | OUTPATIENT
Start: 2019-07-30 | End: 2019-08-02 | Stop reason: HOSPADM

## 2019-07-30 RX ORDER — ACETAMINOPHEN 325 MG/1
650 TABLET ORAL EVERY 6 HOURS PRN
Status: DISCONTINUED | OUTPATIENT
Start: 2019-07-30 | End: 2019-08-02 | Stop reason: HOSPADM

## 2019-07-30 RX ORDER — ONDANSETRON 2 MG/ML
4 INJECTION INTRAMUSCULAR; INTRAVENOUS EVERY 6 HOURS PRN
Status: DISCONTINUED | OUTPATIENT
Start: 2019-07-30 | End: 2019-08-02 | Stop reason: HOSPADM

## 2019-07-30 RX ORDER — SODIUM CHLORIDE 0.9 % (FLUSH) 0.9 %
10 SYRINGE (ML) INJECTION AS NEEDED
Status: DISCONTINUED | OUTPATIENT
Start: 2019-07-30 | End: 2019-08-02 | Stop reason: HOSPADM

## 2019-07-30 RX ORDER — ONDANSETRON 2 MG/ML
4 INJECTION INTRAMUSCULAR; INTRAVENOUS EVERY 6 HOURS PRN
Status: DISCONTINUED | OUTPATIENT
Start: 2019-07-30 | End: 2019-07-30

## 2019-07-30 RX ORDER — LETROZOLE 2.5 MG/1
2.5 TABLET, FILM COATED ORAL DAILY
Status: DISCONTINUED | OUTPATIENT
Start: 2019-07-30 | End: 2019-08-02 | Stop reason: HOSPADM

## 2019-07-30 RX ORDER — SODIUM CHLORIDE 0.9 % (FLUSH) 0.9 %
3-10 SYRINGE (ML) INJECTION AS NEEDED
Status: DISCONTINUED | OUTPATIENT
Start: 2019-07-30 | End: 2019-08-02 | Stop reason: HOSPADM

## 2019-07-30 RX ORDER — SODIUM CHLORIDE 0.9 % (FLUSH) 0.9 %
3 SYRINGE (ML) INJECTION EVERY 12 HOURS SCHEDULED
Status: DISCONTINUED | OUTPATIENT
Start: 2019-07-30 | End: 2019-08-02 | Stop reason: HOSPADM

## 2019-07-30 RX ADMIN — IOPAMIDOL 95 ML: 755 INJECTION, SOLUTION INTRAVENOUS at 10:28

## 2019-07-30 RX ADMIN — SODIUM CHLORIDE, PRESERVATIVE FREE 3 ML: 5 INJECTION INTRAVENOUS at 13:28

## 2019-07-30 RX ADMIN — SODIUM CHLORIDE, PRESERVATIVE FREE 3 ML: 5 INJECTION INTRAVENOUS at 21:32

## 2019-07-30 RX ADMIN — LETROZOLE 2.5 MG: 2.5 TABLET ORAL at 16:13

## 2019-07-31 ENCOUNTER — APPOINTMENT (OUTPATIENT)
Dept: MRI IMAGING | Facility: HOSPITAL | Age: 59
End: 2019-07-31

## 2019-07-31 ENCOUNTER — APPOINTMENT (OUTPATIENT)
Dept: CARDIOLOGY | Facility: HOSPITAL | Age: 59
End: 2019-07-31

## 2019-07-31 LAB
ANION GAP SERPL CALCULATED.3IONS-SCNC: 9.8 MMOL/L (ref 5–15)
AORTIC DIMENSIONLESS INDEX: 0.8 (DI)
BH CV ECHO MEAS - ACS: 2.1 CM
BH CV ECHO MEAS - AO MAX PG: 8.1 MMHG
BH CV ECHO MEAS - AO MEAN PG (FULL): 2 MMHG
BH CV ECHO MEAS - AO MEAN PG: 5 MMHG
BH CV ECHO MEAS - AO ROOT AREA (BSA CORRECTED): 1.8
BH CV ECHO MEAS - AO ROOT AREA: 8 CM^2
BH CV ECHO MEAS - AO ROOT DIAM: 3.2 CM
BH CV ECHO MEAS - AO V2 MAX: 118 CM/SEC
BH CV ECHO MEAS - AO V2 MEAN: 101 CM/SEC
BH CV ECHO MEAS - AO V2 VTI: 30.8 CM
BH CV ECHO MEAS - ASC AORTA: 3.4 CM
BH CV ECHO MEAS - AVA(I,A): 2.4 CM^2
BH CV ECHO MEAS - AVA(I,D): 2.4 CM^2
BH CV ECHO MEAS - BSA(HAYCOCK): 1.8 M^2
BH CV ECHO MEAS - BSA: 1.8 M^2
BH CV ECHO MEAS - BZI_BMI: 26.1 KILOGRAMS/M^2
BH CV ECHO MEAS - BZI_METRIC_HEIGHT: 165 CM
BH CV ECHO MEAS - BZI_METRIC_WEIGHT: 71 KG
BH CV ECHO MEAS - EDV(CUBED): 79.5 ML
BH CV ECHO MEAS - EDV(MOD-SP2): 122 ML
BH CV ECHO MEAS - EDV(MOD-SP4): 118 ML
BH CV ECHO MEAS - EDV(TEICH): 83.1 ML
BH CV ECHO MEAS - EF(CUBED): 54.8 %
BH CV ECHO MEAS - EF(MOD-BP): 66 %
BH CV ECHO MEAS - EF(MOD-SP2): 69.7 %
BH CV ECHO MEAS - EF(MOD-SP4): 60.2 %
BH CV ECHO MEAS - EF(TEICH): 46.9 %
BH CV ECHO MEAS - ESV(CUBED): 35.9 ML
BH CV ECHO MEAS - ESV(MOD-SP2): 37 ML
BH CV ECHO MEAS - ESV(MOD-SP4): 47 ML
BH CV ECHO MEAS - ESV(TEICH): 44.1 ML
BH CV ECHO MEAS - FS: 23.3 %
BH CV ECHO MEAS - IVS/LVPW: 1.2
BH CV ECHO MEAS - IVSD: 1.1 CM
BH CV ECHO MEAS - LAT PEAK E' VEL: 11.3 CM/SEC
BH CV ECHO MEAS - LV DIASTOLIC VOL/BSA (35-75): 66.2 ML/M^2
BH CV ECHO MEAS - LV MASS(C)D: 142.5 GRAMS
BH CV ECHO MEAS - LV MASS(C)DI: 80 GRAMS/M^2
BH CV ECHO MEAS - LV MAX PG: 8.1 MMHG
BH CV ECHO MEAS - LV MEAN PG: 3 MMHG
BH CV ECHO MEAS - LV SYSTOLIC VOL/BSA (12-30): 26.4 ML/M^2
BH CV ECHO MEAS - LV V1 MAX: 142 CM/SEC
BH CV ECHO MEAS - LV V1 MEAN: 77.3 CM/SEC
BH CV ECHO MEAS - LV V1 VTI: 26 CM
BH CV ECHO MEAS - LVIDD: 4.3 CM
BH CV ECHO MEAS - LVIDS: 3.3 CM
BH CV ECHO MEAS - LVLD AP2: 8.3 CM
BH CV ECHO MEAS - LVLD AP4: 7.7 CM
BH CV ECHO MEAS - LVLS AP2: 6.3 CM
BH CV ECHO MEAS - LVLS AP4: 6.5 CM
BH CV ECHO MEAS - LVOT AREA (M): 2.8 CM^2
BH CV ECHO MEAS - LVOT AREA: 2.8 CM^2
BH CV ECHO MEAS - LVOT DIAM: 1.9 CM
BH CV ECHO MEAS - LVPWD: 0.9 CM
BH CV ECHO MEAS - MED PEAK E' VEL: 8.2 CM/SEC
BH CV ECHO MEAS - MV A DUR: 165 SEC
BH CV ECHO MEAS - MV A MAX VEL: 81.9 CM/SEC
BH CV ECHO MEAS - MV DEC SLOPE: 429 CM/SEC^2
BH CV ECHO MEAS - MV DEC TIME: 222 SEC
BH CV ECHO MEAS - MV E MAX VEL: 66.1 CM/SEC
BH CV ECHO MEAS - MV E/A: 0.81
BH CV ECHO MEAS - MV MEAN PG: 1 MMHG
BH CV ECHO MEAS - MV P1/2T MAX VEL: 92.3 CM/SEC
BH CV ECHO MEAS - MV P1/2T: 63 MSEC
BH CV ECHO MEAS - MV V2 MEAN: 56.8 CM/SEC
BH CV ECHO MEAS - MV V2 VTI: 24.6 CM
BH CV ECHO MEAS - MVA P1/2T LCG: 2.4 CM^2
BH CV ECHO MEAS - MVA(P1/2T): 3.5 CM^2
BH CV ECHO MEAS - MVA(VTI): 3 CM^2
BH CV ECHO MEAS - PA ACC SLOPE: 447 CM/SEC^2
BH CV ECHO MEAS - PA ACC TIME: 0.17 SEC
BH CV ECHO MEAS - PA MAX PG (FULL): 2 MMHG
BH CV ECHO MEAS - PA MAX PG: 4.1 MMHG
BH CV ECHO MEAS - PA PR(ACCEL): 4.8 MMHG
BH CV ECHO MEAS - PA V2 MAX: 101 CM/SEC
BH CV ECHO MEAS - PULM A REVS DUR: 0.18 SEC
BH CV ECHO MEAS - PULM A REVS VEL: 32.6 CM/SEC
BH CV ECHO MEAS - PULM DIAS VEL: 53.3 CM/SEC
BH CV ECHO MEAS - PULM S/D: 1.7
BH CV ECHO MEAS - PULM SYS VEL: 92.3 CM/SEC
BH CV ECHO MEAS - PVA(V,A): 3.2 CM^2
BH CV ECHO MEAS - PVA(V,D): 3.2 CM^2
BH CV ECHO MEAS - QP/QS: 1
BH CV ECHO MEAS - RV MAX PG: 2.1 MMHG
BH CV ECHO MEAS - RV MEAN PG: 1 MMHG
BH CV ECHO MEAS - RV V1 MAX: 72.2 CM/SEC
BH CV ECHO MEAS - RV V1 MEAN: 49.4 CM/SEC
BH CV ECHO MEAS - RV V1 VTI: 16.4 CM
BH CV ECHO MEAS - RVOT AREA: 4.5 CM^2
BH CV ECHO MEAS - RVOT DIAM: 2.4 CM
BH CV ECHO MEAS - SI(AO): 139 ML/M^2
BH CV ECHO MEAS - SI(CUBED): 24.5 ML/M^2
BH CV ECHO MEAS - SI(LVOT): 41.4 ML/M^2
BH CV ECHO MEAS - SI(MOD-SP2): 47.7 ML/M^2
BH CV ECHO MEAS - SI(MOD-SP4): 39.9 ML/M^2
BH CV ECHO MEAS - SI(TEICH): 21.9 ML/M^2
BH CV ECHO MEAS - SV(AO): 247.7 ML
BH CV ECHO MEAS - SV(CUBED): 43.6 ML
BH CV ECHO MEAS - SV(LVOT): 73.7 ML
BH CV ECHO MEAS - SV(MOD-SP2): 85 ML
BH CV ECHO MEAS - SV(MOD-SP4): 71 ML
BH CV ECHO MEAS - SV(RVOT): 74.2 ML
BH CV ECHO MEAS - SV(TEICH): 38.9 ML
BH CV ECHO MEAS - TAPSE (>1.6): 2.5 CM2
BH CV ECHO MEASUREMENTS AVERAGE E/E' RATIO: 6.78
BH CV XLRA - RV BASE: 2.3 CM
BH CV XLRA - TDI S': 13.3 CM/SEC
BUN BLD-MCNC: 16 MG/DL (ref 8–23)
BUN/CREAT SERPL: 20.5 (ref 7–25)
CALCIUM SPEC-SCNC: 9.3 MG/DL (ref 8.6–10.5)
CHLORIDE SERPL-SCNC: 106 MMOL/L (ref 98–107)
CO2 SERPL-SCNC: 25.2 MMOL/L (ref 22–29)
CREAT BLD-MCNC: 0.78 MG/DL (ref 0.57–1)
DEPRECATED RDW RBC AUTO: 54.4 FL (ref 37–54)
ERYTHROCYTE [DISTWIDTH] IN BLOOD BY AUTOMATED COUNT: 15.5 % (ref 12.3–15.4)
FOLATE SERPL-MCNC: 18.9 NG/ML (ref 4.78–24.2)
GFR SERPL CREATININE-BSD FRML MDRD: 75 ML/MIN/1.73
GLUCOSE BLD-MCNC: 108 MG/DL (ref 65–99)
HCT VFR BLD AUTO: 37.1 % (ref 34–46.6)
HGB BLD-MCNC: 11.6 G/DL (ref 12–15.9)
LEFT ATRIUM VOLUME INDEX: 48 ML/M2
LV EF 2D ECHO EST: 66 %
MAXIMAL PREDICTED HEART RATE: 159 BPM
MCH RBC QN AUTO: 30.3 PG (ref 26.6–33)
MCHC RBC AUTO-ENTMCNC: 31.3 G/DL (ref 31.5–35.7)
MCV RBC AUTO: 96.9 FL (ref 79–97)
PLATELET # BLD AUTO: 231 10*3/MM3 (ref 140–450)
PMV BLD AUTO: 8.8 FL (ref 6–12)
POTASSIUM BLD-SCNC: 4.3 MMOL/L (ref 3.5–5.2)
RBC # BLD AUTO: 3.83 10*6/MM3 (ref 3.77–5.28)
SODIUM BLD-SCNC: 141 MMOL/L (ref 136–145)
STRESS TARGET HR: 135 BPM
VIT B12 BLD-MCNC: 695 PG/ML (ref 211–946)
WBC NRBC COR # BLD: 5.02 10*3/MM3 (ref 3.4–10.8)

## 2019-07-31 PROCEDURE — 0 GADOBENATE DIMEGLUMINE 529 MG/ML SOLUTION: Performed by: HOSPITALIST

## 2019-07-31 PROCEDURE — 93306 TTE W/DOPPLER COMPLETE: CPT

## 2019-07-31 PROCEDURE — 85027 COMPLETE CBC AUTOMATED: CPT | Performed by: NURSE PRACTITIONER

## 2019-07-31 PROCEDURE — 70553 MRI BRAIN STEM W/O & W/DYE: CPT

## 2019-07-31 PROCEDURE — 93306 TTE W/DOPPLER COMPLETE: CPT | Performed by: INTERNAL MEDICINE

## 2019-07-31 PROCEDURE — 96375 TX/PRO/DX INJ NEW DRUG ADDON: CPT

## 2019-07-31 PROCEDURE — 25010000002 ONDANSETRON PER 1 MG: Performed by: NURSE PRACTITIONER

## 2019-07-31 PROCEDURE — A9577 INJ MULTIHANCE: HCPCS | Performed by: HOSPITALIST

## 2019-07-31 PROCEDURE — 82746 ASSAY OF FOLIC ACID SERUM: CPT | Performed by: NURSE PRACTITIONER

## 2019-07-31 PROCEDURE — 96374 THER/PROPH/DIAG INJ IV PUSH: CPT

## 2019-07-31 PROCEDURE — 99213 OFFICE O/P EST LOW 20 MIN: CPT | Performed by: NURSE PRACTITIONER

## 2019-07-31 PROCEDURE — G0378 HOSPITAL OBSERVATION PER HR: HCPCS

## 2019-07-31 PROCEDURE — 25010000002 PROMETHAZINE PER 50 MG: Performed by: HOSPITALIST

## 2019-07-31 PROCEDURE — 25010000002 PERFLUTREN (DEFINITY) 8.476 MG IN SODIUM CHLORIDE 0.9 % 10 ML INJECTION: Performed by: NURSE PRACTITIONER

## 2019-07-31 PROCEDURE — 99214 OFFICE O/P EST MOD 30 MIN: CPT | Performed by: INTERNAL MEDICINE

## 2019-07-31 PROCEDURE — 82607 VITAMIN B-12: CPT | Performed by: NURSE PRACTITIONER

## 2019-07-31 PROCEDURE — 80048 BASIC METABOLIC PNL TOTAL CA: CPT | Performed by: NURSE PRACTITIONER

## 2019-07-31 PROCEDURE — 36415 COLL VENOUS BLD VENIPUNCTURE: CPT | Performed by: NURSE PRACTITIONER

## 2019-07-31 RX ORDER — MECLIZINE HYDROCHLORIDE 25 MG/1
25 TABLET ORAL EVERY 8 HOURS SCHEDULED
Status: DISCONTINUED | OUTPATIENT
Start: 2019-07-31 | End: 2019-08-02

## 2019-07-31 RX ORDER — PROMETHAZINE HYDROCHLORIDE 25 MG/ML
12.5 INJECTION, SOLUTION INTRAMUSCULAR; INTRAVENOUS EVERY 6 HOURS PRN
Status: DISCONTINUED | OUTPATIENT
Start: 2019-07-31 | End: 2019-08-02 | Stop reason: HOSPADM

## 2019-07-31 RX ADMIN — SODIUM CHLORIDE, PRESERVATIVE FREE 3 ML: 5 INJECTION INTRAVENOUS at 09:30

## 2019-07-31 RX ADMIN — MECLIZINE HYDROCHLORIDE 25 MG: 25 TABLET ORAL at 21:07

## 2019-07-31 RX ADMIN — GADOBENATE DIMEGLUMINE 15 ML: 529 INJECTION, SOLUTION INTRAVENOUS at 17:15

## 2019-07-31 RX ADMIN — PERFLUTREN 2 ML: 6.52 INJECTION, SUSPENSION INTRAVENOUS at 10:40

## 2019-07-31 RX ADMIN — SODIUM CHLORIDE, PRESERVATIVE FREE 3 ML: 5 INJECTION INTRAVENOUS at 21:07

## 2019-07-31 RX ADMIN — PROMETHAZINE HYDROCHLORIDE 12.5 MG: 25 INJECTION INTRAMUSCULAR; INTRAVENOUS at 12:59

## 2019-07-31 RX ADMIN — ONDANSETRON HYDROCHLORIDE 4 MG: 2 SOLUTION INTRAMUSCULAR; INTRAVENOUS at 08:56

## 2019-08-01 ENCOUNTER — APPOINTMENT (OUTPATIENT)
Dept: NUCLEAR MEDICINE | Facility: HOSPITAL | Age: 59
End: 2019-08-01

## 2019-08-01 ENCOUNTER — APPOINTMENT (OUTPATIENT)
Dept: CT IMAGING | Facility: HOSPITAL | Age: 59
End: 2019-08-01

## 2019-08-01 LAB
BH CV STRESS COMMENTS STAGE 1: NORMAL
BH CV STRESS DOSE REGADENOSON STAGE 1: 0.4
BH CV STRESS DURATION MIN STAGE 1: 0
BH CV STRESS DURATION SEC STAGE 1: 10
BH CV STRESS PROTOCOL 1: NORMAL
BH CV STRESS RECOVERY BP: NORMAL MMHG
BH CV STRESS RECOVERY HR: 75 BPM
BH CV STRESS STAGE 1: 1
LV EF NUC BP: 61 %
MAXIMAL PREDICTED HEART RATE: 159 BPM
PERCENT MAX PREDICTED HR: 65.41 %
STRESS BASELINE BP: NORMAL MMHG
STRESS BASELINE HR: 55 BPM
STRESS PERCENT HR: 77 %
STRESS POST PEAK BP: NORMAL MMHG
STRESS POST PEAK HR: 104 BPM
STRESS TARGET HR: 135 BPM

## 2019-08-01 PROCEDURE — 99244 OFF/OP CNSLTJ NEW/EST MOD 40: CPT | Performed by: PSYCHIATRY & NEUROLOGY

## 2019-08-01 PROCEDURE — 70498 CT ANGIOGRAPHY NECK: CPT

## 2019-08-01 PROCEDURE — 25010000002 REGADENOSON 0.4 MG/5ML SOLUTION: Performed by: INTERNAL MEDICINE

## 2019-08-01 PROCEDURE — G0378 HOSPITAL OBSERVATION PER HR: HCPCS

## 2019-08-01 PROCEDURE — 99213 OFFICE O/P EST LOW 20 MIN: CPT | Performed by: NURSE PRACTITIONER

## 2019-08-01 PROCEDURE — 70496 CT ANGIOGRAPHY HEAD: CPT

## 2019-08-01 PROCEDURE — A9500 TC99M SESTAMIBI: HCPCS | Performed by: INTERNAL MEDICINE

## 2019-08-01 PROCEDURE — 93018 CV STRESS TEST I&R ONLY: CPT | Performed by: INTERNAL MEDICINE

## 2019-08-01 PROCEDURE — 93017 CV STRESS TEST TRACING ONLY: CPT

## 2019-08-01 PROCEDURE — 0 TECHNETIUM SESTAMIBI: Performed by: INTERNAL MEDICINE

## 2019-08-01 PROCEDURE — 0 IOPAMIDOL PER 1 ML: Performed by: HOSPITALIST

## 2019-08-01 PROCEDURE — 78452 HT MUSCLE IMAGE SPECT MULT: CPT

## 2019-08-01 PROCEDURE — 93016 CV STRESS TEST SUPVJ ONLY: CPT | Performed by: INTERNAL MEDICINE

## 2019-08-01 PROCEDURE — 78452 HT MUSCLE IMAGE SPECT MULT: CPT | Performed by: INTERNAL MEDICINE

## 2019-08-01 PROCEDURE — 99214 OFFICE O/P EST MOD 30 MIN: CPT | Performed by: INTERNAL MEDICINE

## 2019-08-01 RX ORDER — SODIUM CHLORIDE 0.9 % (FLUSH) 0.9 %
3-10 SYRINGE (ML) INJECTION AS NEEDED
Status: DISCONTINUED | OUTPATIENT
Start: 2019-08-01 | End: 2019-08-02 | Stop reason: HOSPADM

## 2019-08-01 RX ORDER — ASPIRIN 600 MG
300 SUPPOSITORY, RECTAL RECTAL DAILY
Status: DISCONTINUED | OUTPATIENT
Start: 2019-08-01 | End: 2019-08-02

## 2019-08-01 RX ORDER — ATORVASTATIN CALCIUM 80 MG/1
80 TABLET, FILM COATED ORAL NIGHTLY
Status: DISCONTINUED | OUTPATIENT
Start: 2019-08-01 | End: 2019-08-02 | Stop reason: HOSPADM

## 2019-08-01 RX ORDER — ASPIRIN 325 MG
162 TABLET ORAL DAILY
Status: DISCONTINUED | OUTPATIENT
Start: 2019-08-01 | End: 2019-08-01

## 2019-08-01 RX ORDER — SODIUM CHLORIDE 0.9 % (FLUSH) 0.9 %
3 SYRINGE (ML) INJECTION EVERY 12 HOURS SCHEDULED
Status: DISCONTINUED | OUTPATIENT
Start: 2019-08-01 | End: 2019-08-02 | Stop reason: HOSPADM

## 2019-08-01 RX ORDER — ASPIRIN 325 MG
325 TABLET ORAL DAILY
Status: DISCONTINUED | OUTPATIENT
Start: 2019-08-01 | End: 2019-08-02

## 2019-08-01 RX ADMIN — MECLIZINE HYDROCHLORIDE 25 MG: 25 TABLET ORAL at 16:50

## 2019-08-01 RX ADMIN — TECHNETIUM TC 99M SESTAMIBI 1 DOSE: 1 INJECTION INTRAVENOUS at 06:15

## 2019-08-01 RX ADMIN — SODIUM CHLORIDE, PRESERVATIVE FREE 3 ML: 5 INJECTION INTRAVENOUS at 20:17

## 2019-08-01 RX ADMIN — ATORVASTATIN CALCIUM 80 MG: 80 TABLET, FILM COATED ORAL at 20:16

## 2019-08-01 RX ADMIN — LETROZOLE 2.5 MG: 2.5 TABLET ORAL at 12:27

## 2019-08-01 RX ADMIN — MECLIZINE HYDROCHLORIDE 25 MG: 25 TABLET ORAL at 22:42

## 2019-08-01 RX ADMIN — SODIUM CHLORIDE, PRESERVATIVE FREE 3 ML: 5 INJECTION INTRAVENOUS at 20:16

## 2019-08-01 RX ADMIN — REGADENOSON 0.4 MG: 0.08 INJECTION, SOLUTION INTRAVENOUS at 10:30

## 2019-08-01 RX ADMIN — IOPAMIDOL 95 ML: 755 INJECTION, SOLUTION INTRAVENOUS at 21:58

## 2019-08-01 RX ADMIN — TECHNETIUM TC 99M SESTAMIBI 1 DOSE: 1 INJECTION INTRAVENOUS at 10:30

## 2019-08-01 RX ADMIN — MECLIZINE HYDROCHLORIDE 25 MG: 25 TABLET ORAL at 06:17

## 2019-08-01 RX ADMIN — SODIUM CHLORIDE, PRESERVATIVE FREE 3 ML: 5 INJECTION INTRAVENOUS at 14:25

## 2019-08-01 RX ADMIN — ASPIRIN 162 MG: 325 TABLET ORAL at 12:27

## 2019-08-01 NOTE — PROGRESS NOTES
Subjective     REASON FOR FOLLOW UP:  1. 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 R BREAST MASS WITH GIANT R AXILLARY ADENOPATHY.  3. FAMILY HISTORY OF BREAST CANCER IN MOTHER AND SISTER, SISTER WAS TESTED FOR BRCA AND WAS NEGATIVE.                                       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 She does not remember any presyncopal episode, any chest pain, any seizure activity. When she woke up she was nauseated but no neurological focal deficit was present. The patient had no evidence of incontinence and she had not bitten her tongue. Upon the time that she woke up she has not had any other deficit. She has been feeling fine besides the nausea with movement in the stretcher in the ambulance. This is the 1st time it has ever happened to her. At initial assessment in the emergency room nothing major has been found including no electrolyte imbalance, no vein trombosis at any level, no pulmonary embolus, no myocardial infarction, no cardiac arrhythmia, and no evidence of a stroke. The patient is now in the room feeling perfectly fine.       Pt has symptoms of vertigo, spinning and nauseated      On 08/01/2019 the patient's vertigo symptomatology has disappeared as well as her nausea and even her headache. She has moved in the bed, stood up, walking and going to the bathroom with no difficulties. She also had her pharmacological stress test this morning. She is starving to death.     She has not had any chest pain. She has minimal bowel activity, urination is ongoing. No fevers, no chills.                       Past Medical History:   Diagnosis Date   • Arthritis    • Breast cancer (CMS/HCC)     Left   • Hip pain     RIGHT HIP... CYST   • History of fracture of leg 1987   • Skin sore     OPEN SORE LEFT BREAST            Past Surgical History:   Procedure Laterality Date   • FRACTURE SURGERY  1987    Leg   • HARDWARE REMOVAL     • VENOUS ACCESS DEVICE (PORT) INSERTION Right 2/1/2019    Procedure: INSERTION VENOUS ACCESS DEVICE;  Surgeon: Joby Barron Jr., MD;  Location: [facility] OR [facility] [place];  Service: General        No current facility-administered medications on file prior to encounter.      Current Outpatient Medications on File Prior to Encounter   Medication Sig Dispense Refill   • diclofenac sodium (VOTAREN XR) 100 MG 24 hr tablet Take 1 tablet by mouth Daily. 30 tablet 3   • IBUPROFEN PO Take 1 tablet by mouth As Needed.     • Multiple Vitamin (MULTI VITAMIN DAILY PO) Take  by mouth.     • olopatadine (PATANOL) 0.1 % ophthalmic solution Administer 1 drop to both eyes 2 (Two) Times a Day. 5 mL 5   • letrozole (FEMARA) 2.5 MG tablet Take 1 tablet by mouth Daily. 30 tablet 6   • metroNIDAZOLE (METROGEL) 1 % gel Apply  topically to the appropriate area as directed Daily. 1 tube 1   • oxyCODONE-acetaminophen (PERCOCET) 5-325 MG per tablet 1 q 6 hrs prn pain 20 tablet 0        ALLERGIES:    Allergies   Allergen Reactions   • Erythromycin GI Intolerance   • Levaquin [Levofloxacin] GI Intolerance   • Penicillins GI Intolerance        Social History     Socioeconomic History   • Marital status:      Spouse name: Cruz   • Number of children: 0   • Years of education: Not on file   • Highest education level: Not on file   Occupational History   • Occupation:      Employer: SELF-EMPLOYED   Social Needs   • Financial resource strain: Not hard at all   • Food insecurity:     Worry: Never true     Inability: Never true   • Transportation needs:     Medical: No     Non-medical: No   Tobacco Use   • Smoking status: Never Smoker   • Smokeless tobacco: Never Used   Substance and Sexual Activity   • Alcohol use: Yes     Alcohol/week: 4.2 oz     Types: 4 Glasses of wine, 3 Cans of beer per week     Frequency: 2-3 times  a week     Drinks per session: 1 or 2     Binge frequency: Never     Comment: OCCASIONALLY, NONE IN LAST TWO DAYS   • Drug use: No   • Sexual activity: Not Currently     Partners: Male     Birth control/protection: Post-menopausal   Lifestyle   • Physical activity:     Days per week: 7 days     Minutes per session: 120 min   • Stress: Only a little        Family History   Problem Relation Age of Onset   • Lung cancer Father    • Cancer Mother    • Breast cancer Mother    • Liver disease Mother    • Breast cancer Sister 48   • Breast cancer Maternal Grandmother    • Breast cancer Paternal Grandmother    • Breast cancer Maternal Uncle    • Malig Hyperthermia Neg Hx              Ros:  General: no fever, no chills, no fatigue,no weight changes, no lack of appetite.  Eyes: no epiphora, xerophthalmia,conjunctivitis, pain, glaucoma, blurred vision, blindness, secretion, photophobia, proptosis, diplopia.  Ears: no otorrhea, tinnitus, otorrhagia, deafness, pain, vertigo.  Nose: no rhinorrhea, no epistaxis, no alteration in perception of odors, no sinuses pressure.  Mouth: no alteration in gums or teeth,  No ulcers, no difficulty with mastication or deglut ion, no odynophagia.  Neck: no masses or pain, no thyroid alterations, no pain in muscles or arteries, no carotid odynia, no crepitation.  Respiratory: no cough, no sputum production,no dyspnea,no trepopnea, no pleuritic pain,no hemoptysis.  Heart: stated syncope, no irregularity, no palpitations, no angina,no orthopnea,no paroxysmal nocturnal dyspnea.  Vascular Venous: no tenderness,no edema,no palpable cords,no postphlebitic syndrome, no skin changes no ulcerations.  Vascular Arterial: no distal ischemia, noclaudication, no gangrene, no neuropathic ischemic pain, no skin ulcers, no paleness no cyanosis.  GI: no dysphagia, no odynophagia, no regurgitation, no heartburn,no indigestion,no nausea,no vomiting,no hematemesis ,no melena,no jaundice,no distention, no  obstipation,no enterorrhagia,no proctalgia,no anal  lesions, no changes in bowel habits.  : no frequency, no hesitancy, no hematuria, no discharge,no  pain.  Musculoskeletal: no muscle or tendon pain or inflammation,no  joint pain, no edema, no functional limitation,no fasciculations, no mass.  Neurologic: no headache, no seizures, noalterations on Craneal nerves, no motor deficit, no sensory deficit, normal coordination, no alteration in memory,normal orientation, calculation,normal writting, verbal and written language.  Skin: no rashes,no pruritus no localized lesions.  Psychiatric: no anxiety, no depression,no agitation, no delusions, proper insight.        Objective     Vitals:    08/01/19 0100 08/01/19 0300 08/01/19 0500 08/01/19 0700   BP:    135/92   BP Location:    Right arm   Patient Position:    Lying   Pulse: 62 53 59    Resp:    18   Temp:    98.2 °F (36.8 °C)   TempSrc:    Oral   SpO2: 94% 98% 94% 97%   Weight:       Height:         Current Status 7/26/2019   ECOG score 0       Physical Exam   GENERAL:  Well-developed, well-nourished  Patient  in no acute distress.   SKIN:  Warm, dry ,NO rashes,NO purpura ,NO petechiae.  HEENT:  Pupils were equal and reactive to light and accomodation, conjunctivas non injected, no pterigion, normal extraocular movements, normal visual acuity. She has nystagmus.  Mouth mucosa was moist, no exudates in oropharynx, normal gum line, normal roof of the mouth and pillars, normal papillations of the tongue.  NECK:  Supple with good range of motion; no thyromegaly or masses, no JVD or bruits, no cervical adenopathies.No carotid arteries pain, no carotid abnormal pulsation , NO arterial dance.  LYMPHATICS:  No cervical, NO supraclavicular, NO axillary,NO epitrochlear , NO inguinal adenopathy.  CHEST:  Normal excursion of both luz thoraces, normal voice fremitus, no subcutaneous emphysema, normal axillas, no rashes or acanthosis nigricans. Lungs clear to percussion and  auscultation, normal breath sounds bilaterally, no wheezing,NO crackles NO ronchi, NO stridor, NO rubs.  CARDIAC AND VASCULAR:  normal rate and regular rhythm, without murmurs,NO rubs NO S3 NO S4 right or left . Normal femoral, popliteal, pedis, brachial and carotid pulses.  ABDOMEN:  Soft, nontender with no organomegaly or masses, no ascites, no collateral circulation,no distention,no Vernon sign, no abdominal pain, no inguinal hernias,no umbilical hernia, no abdominal bruits. Normal bowel sounds.  GENITAL: Not  Performed.  EXTREMITIES  AND SPINE:  No clubbing, cyanosis or edema, no deformities or pain .No kyphosis, scoliosis, deformities or pain in spine, ribs or pelvic bone.  NEUROLOGICAL:  Patient was awake, alert, oriented to time, person and place.                      RECENT LABS:  Hematology WBC   Date Value Ref Range Status   07/31/2019 5.02 3.40 - 10.80 10*3/mm3 Final     RBC   Date Value Ref Range Status   07/31/2019 3.83 3.77 - 5.28 10*6/mm3 Final     Hemoglobin   Date Value Ref Range Status   07/31/2019 11.6 (L) 12.0 - 15.9 g/dL Final     Hematocrit   Date Value Ref Range Status   07/31/2019 37.1 34.0 - 46.6 % Final     Platelets   Date Value Ref Range Status   07/31/2019 231 140 - 450 10*3/mm3 Final        Interpretation Summary     · Estimated EF = 66%.  · Left ventricular systolic function is normal.  · Left atrial cavity size is moderately dilated.  · Saline test results are negative.          ECG 12 Lead   Order: [id number]   Status:  Final result   Visible to patient:  No (Not Released)   Next appt:  08/14/2019 at 07:30 AM in Radiology ([facility] PET ADMIN ROOM)      Narrative     HEART RATE= 61  bpm  RR Interval= 980  ms  WV Interval= 162  ms  P Horizontal Axis= 14  deg  P Front Axis= 5  deg  QRSD Interval= 89  ms  QT Interval= 409  ms  QRS Axis= 37  deg  T Wave Axis= -12  deg  - BORDERLINE ECG -  Sinus rhythm  Nonspecific  T abnormalities, inferior leads  NO PRIOR TRACING AVAILABLE FOR  COMPARISON  Electronically Signed By: Frank Chavez ([facility]) 31-Jul-2019 00:02:17  Date and Time of Study: 2019-07-30 08:44:21      Specimen Collected: 07/30/19 08:44 Last Resulted: 07/31/19 00:02           MRI BRAIN W WO CONTRAST-     CLINICAL HISTORY: Head trauma. Vertigo. Syncope. Nausea. History of  breast cancer.     TECHNIQUE: Multiple axial T1, T2, gradient echo and diffusion images  were acquired. Axial and coronal and sagittal T1-weighted images were  also obtained after intravenous injection of MultiHance contrast.      COMPARISON: MRI of the brain dated 01/24/2019.     FINDINGS: The brain and ventricular system appear structurally normal.  There are a few tiny nodular foci of T2 hyperintensity scattered  throughout the white matter of both cerebral hemispheres that appear  quite similar on the previous MRI study. These are compatible with  sequela of minimal small vessel chronic ischemic change. There is no  evidence of acute infarct or hemorrhage. No foci of restricted diffusion  are identified in the brain or brainstem. There is a tiny chronic  lacunar infarct in the left side of the jasbir that is new since the  preceding MRI study. No other infarcts are identified. There are no  masses. No foci of abnormal enhancement are identified in the brain or  brainstem. The 7th and 8th nerve complexes appear within normal limits  bilaterally. The paranasal sinuses are well aerated. Normal flow voids  are observed in the major vascular structures.     IMPRESSION:  Evidence of minimal small vessel chronic ischemic change as  noted. Tiny chronic lacunar infarct in the jasbir that is new since the  preceding MRI dated 01/24/2019. Otherwise unremarkable MRI of the brain.  No acute abnormality is identified.     This report was finalized on 7/31/2019 5:39 PM by Dr. Sridhar Bee M.D.         Assessment/Plan  This patient has had a syncopal episode today. She was not dehydrated. She was not having any nausea,  vomiting or diarrhea. Her blood pressure has not been out of control. She was not having any chest pain and she has not been hungry or eating different. She has never had seizure activity. The etiology of her process is not clear to me. Most likely cardiovascular in the way how it presented and I think the patient needs to have full assessment. I agree with the echocardiogram. I am going to go ahead and consult Cardiology.     The patient is supposed to have a PET scan and DEXA scan in the next few days as an outpatient and this will be kept ongoing. Other than that, I have nothing to add to her care at this point besides  to see what happened to her. She has minimal excoriation and ecchymosis in the right shoulder and has no consequences for her.     All these issues were discussed with the hospitalist, nurse practitioner. This was discussed with the patient and with her brother on the telephone.    The patient has had symptomatology today that looks to me like vertigo. She has horizontal nystagmus upon extreme gaze on the left side and she has in the Rinne test, lateralization towards the left. The patient suggests to me again vertigo. She has had car sickness before and those 2 conditions go together. Therefore, I am going to proceed with some meclizine tonight and see how she does.     The MRI documents a lacunar infarct in the jasbir that is new in comparison with previous MRI done in the first of this year. Her blood pressure is elevated and she will require proper control of blood pressure from now on.     I discussed this with the patient and her .    On 08/01/2019 the patient is doing better from the point of view of the vertigo after Meclizine was given. This medicine can continue ongoing for a while.     From my point of view she is almost getting ready for discharge. She will reschedule the testing that I assigned her in my previous office note a few days ago. I am going to go ahead and release her  diet and that way she does not starve herself to death.     She can go home on Meclizine or dramamine that she can take over the counter and take once or twice a day. Maybe eventually she will require to be seen by ENT and reset her inner ear physiology.

## 2019-08-01 NOTE — SIGNIFICANT NOTE
08/01/19 1503   Rehab Time/Intention   Evaluation Not Performed other (see comments)  (RN states per MD no need to see, CVA ruled, out, patient on diet)   Rehab Treatment   Discipline speech language pathologist

## 2019-08-01 NOTE — CONSULTS
Neurology Note    Patient:  Marylu Georges    YOB: 1960    REFERRING PHYSICIAN:  Joel Naranjo MD    CHIEF COMPLAINT:    dizziness    HISTORY OF PRESENT ILLNESS:   The patient is a 59 y.o. female  with h/o breast cancer, HTN. She is admitted with syncope while at work on 7/30 that resulted in a fall with head trauma. She has had some N/V and positional vertigo since which is improving, She has had a cranial MRI that showed mild chronic small vessel changes and an small left pontine stroke which is new since January. She denies any specific stroke symptoms, numbness, weakness, aphasia. SBP here has been in 100s to 130s.    Past Medical History:  Past Medical History:   Diagnosis Date   • Arthritis    • Breast cancer (CMS/HCC)     Left   • Hip pain     RIGHT HIP... CYST   • History of fracture of leg 1987   • Skin sore     OPEN SORE LEFT BREAST       Past Surgical History:  Past Surgical History:   Procedure Laterality Date   • FRACTURE SURGERY  1987    Leg   • HARDWARE REMOVAL     • VENOUS ACCESS DEVICE (PORT) INSERTION Right 2/1/2019    Procedure: INSERTION VENOUS ACCESS DEVICE;  Surgeon: Joby Barron Jr., MD;  Location: [facility] OR [facility] – [place], [place];  Service: General       Social History:   Social History     Socioeconomic History   • Marital status:      Spouse name: Cruz   • Number of children: 0   • Years of education: Not on file   • Highest education level: Not on file   Occupational History   • Occupation:      Employer: SELF-EMPLOYED   Social Needs   • Financial resource strain: Not hard at all   • Food insecurity:     Worry: Never true     Inability: Never true   • Transportation needs:     Medical: No     Non-medical: No   Tobacco Use   • Smoking status: Never Smoker   • Smokeless tobacco: Never Used   Substance and Sexual Activity   • Alcohol use: Yes     Alcohol/week: 4.2 oz     Types: 4 Glasses of wine, 3 Cans of beer per week     Frequency: 2-3 times a  week     Drinks per session: 1 or 2     Binge frequency: Never     Comment: OCCASIONALLY, NONE IN LAST TWO DAYS   • Drug use: No   • Sexual activity: Not Currently     Partners: Male     Birth control/protection: Post-menopausal   Lifestyle   • Physical activity:     Days per week: 7 days     Minutes per session: 120 min   • Stress: Only a little        Family History:   Family History   Problem Relation Age of Onset   • Lung cancer Father    • Cancer Mother    • Breast cancer Mother    • Liver disease Mother    • Breast cancer Sister 48   • Breast cancer Maternal Grandmother    • Breast cancer Paternal Grandmother    • Breast cancer Maternal Uncle    • Malig Hyperthermia Neg Hx        Medications Prior to Admission:    Prior to Admission medications    Medication Sig Start Date End Date Taking? Authorizing Provider   diclofenac sodium (VOTAREN XR) 100 MG 24 hr tablet Take 1 tablet by mouth Daily. 4/12/19  Yes Elie Dixon MD   IBUPROFEN PO Take 1 tablet by mouth As Needed.   Yes Provider, MD Chante   Multiple Vitamin (MULTI VITAMIN DAILY PO) Take  by mouth.   Yes ProviderChante MD   olopatadine (PATANOL) 0.1 % ophthalmic solution Administer 1 drop to both eyes 2 (Two) Times a Day. 3/29/19  Yes Elie Dixon MD   letrozole (FEMARA) 2.5 MG tablet Take 1 tablet by mouth Daily. 7/26/19   Elie Dixon MD   metroNIDAZOLE (METROGEL) 1 % gel Apply  topically to the appropriate area as directed Daily. 3/7/19   Elie Dixon MD   oxyCODONE-acetaminophen (PERCOCET) 5-325 MG per tablet 1 q 6 hrs prn pain 2/1/19   Joby Barron Jr., MD       Allergies:  Erythromycin; Levaquin [levofloxacin]; and Penicillins      Review of system  Review of Systems   Gastrointestinal: Positive for nausea and vomiting.   Neurological: Positive for dizziness and syncope.   All other systems reviewed and are negative.      Vitals:    08/01/19 1300   BP: 108/75   Pulse:    Resp: 16   Temp: 98.5 °F (36.9 °C)   SpO2: 97%        Physical exam  Physical Exam   Constitutional: She is oriented to person, place, and time. She appears well-developed and well-nourished.   HENT:   Head: Normocephalic and atraumatic.   Eyes: EOM are normal. Pupils are equal, round, and reactive to light.   Neck: Carotid bruit is not present.   Cardiovascular: Normal rate, regular rhythm, normal heart sounds and intact distal pulses.   Pulmonary/Chest: Effort normal and breath sounds normal.   Neurological: She is alert and oriented to person, place, and time. She has normal strength and normal reflexes. No cranial nerve deficit or sensory deficit. She displays a negative Romberg sign. Coordination and gait normal. She displays no Babinski's sign on the right side. She displays no Babinski's sign on the left side.   Skin: Skin is warm.   Psychiatric: She has a normal mood and affect. Her behavior is normal. Thought content normal. Cognition and memory are normal.         Lab Results   Component Value Date    WBC 5.02 07/31/2019    HGB 11.6 (L) 07/31/2019    HCT 37.1 07/31/2019    MCV 96.9 07/31/2019     07/31/2019     Lab Results   Component Value Date    GLUCOSE 108 (H) 07/31/2019    BUN 16 07/31/2019    CREATININE 0.78 07/31/2019    EGFRIFNONA 75 07/31/2019    BCR 20.5 07/31/2019    CO2 25.2 07/31/2019    CALCIUM 9.3 07/31/2019    ALBUMIN 4.30 07/30/2019    AST 36 (H) 07/30/2019    ALT 46 (H) 07/30/2019   ECG 12 Lead   Order: [id number]   Status:  Final result   Visible to patient:  No (Not Released)   Next appt:  08/14/2019 at 07:30 AM in Radiology ([facility] PET ADMIN ROOM)      Narrative     HEART RATE= 61  bpm  RR Interval= 980  ms  OK Interval= 162  ms  P Horizontal Axis= 14  deg  P Front Axis= 5  deg  QRSD Interval= 89  ms  QT Interval= 409  ms  QRS Axis= 37  deg  T Wave Axis= -12  deg  - BORDERLINE ECG -  Sinus rhythm  Nonspecific  T abnormalities, inferior leads  NO PRIOR TRACING AVAILABLE FOR COMPARISON  Electronically Signed By: Frank Chavez  "([facility]) 2019 00:02:17  Date and Time of Study: 2019 08:44:21      Specimen Collected: 19 08:44 Last Resulted: 19 00:02             Echocardiogram   Order# [id number]   Reading physician: Caitlyn Johns MD Ordering physician: Mare Humphries APRN Study date: 19   Patient Information     Patient Name  Marylu Georges MRN  [id number] Sex  Female  (Age)  1960 (59 y.o.)   Admission Information     Admission Date/Time Discharge Date/Time Room/Bed   19  0825  N535/1   Sedation Narrator Report     Sedation Narrator Report   Interpretation Summary     · Estimated EF = 66%.  · Left ventricular systolic function is normal.  · Left atrial cavity size is moderately dilated.  · Saline test results are negative.      Patient Hx Of Height, Weight, and Vitals     Height Weight BSA (Calculated - sq m) BMI (kg/m2) Pulse BP   165 cm (64.96\") 71 kg (156 lb 8.4 oz) 1.78 sq meters 26.13 74 143/105   Reason For Exam     Syncope   Cardiac History     No past medical history on file.   Study Description     .A two-dimensional transthoracic echocardiogram with complete color flow and Doppler was performed. The study is technically good for diagnosis.Verbal consent was obtained from the patient to use Definity contrast in order to optimize the study. The use of Definity was indicated as two or more contiguous segments of the left ventricular endocardial border were unable to be adequately visualized by standard imaging methods. 1.3 mL of mechanically activated Definity was mixed with 8.7 mL of normal saline. A total of 2 mL of the resulting Definity solution was administered, and the remaining contrast was wasted and discarded.   No adverse reaction to contrast was noted.   Echocardiogram Findings     Left Ventricle Left ventricular systolic function is normal. Calculated EF = 66.0%. Estimated EF = 66%. Normal left ventricular cavity size and wall thickness noted. All left ventricular wall " segments contract normally. Left ventricular diastolic function is normal.   Right Ventricle Normal right ventricular cavity size, wall thickness, systolic function and septal motion noted.   Left Atrium Left atrial cavity size is moderately dilated. Saline test results are negative.   Right Atrium Normal right atrial size noted.   Aortic Valve The aortic valve is structurally normal. No aortic valve regurgitation is present. No aortic valve stenosis is present.   Mitral Valve The mitral valve is normal in structure. Trace mitral valve regurgitation is present. No significant mitral valve stenosis is present.   Tricuspid Valve The tricuspid valve is normal. No evidence of tricuspid valve stenosis is present. No tricuspid valve regurgitation is present.   Pulmonic Valve The pulmonic valve is structurally normal. There is no significant pulmonic valve stenosis present. There is no pulmonic valve regurgitation present.   Greater Vessels No dilation of the aortic root is present.   Pericardium There is no evidence of pericardial effusion.         Radiological Studies:    Xr Chest 2 View    Result Date: 7/30/2019  EMERGENCY PA AND LATERAL CHEST X-RAY 07/30/2019  CLINICAL HISTORY: Syncope, lightheadedness, passed out, nausea, history of breast cancer.  This is correlated to prior portable chest x-ray 02/01/2019.  FINDINGS: There is a right subclavian Mediport in place, distal tip projects over the expected location of the superior vena cava in good position. The cardiomediastinal silhouette and pulmonary vasculature are within normal limits. There are low lung volumes with moderate elevation of the right hemidiaphragm with patchy and horizontal opacities at both lung bases likely compressive atelectasis of the lung bases.      Since prior chest x-ray 02/01/2019 there has been a decrease in lung volumes. The patient currently has low lung volumes with increasing elevation of the right hemidiaphragm, patchy bibasilar  airspace opacities that I favor is atelectasis over pneumonia, correlate clinically. The remainder of the chest x-ray is unremarkable.  This report was finalized on 7/30/2019 9:58 AM by Dr. Berny Freeman M.D.      Ct Head Without Contrast    Result Date: 7/30/2019  CT SCAN HEAD WITHOUT CONTRAST  CLINICAL HISTORY: Syncope resulting in fall and head trauma.  CT scan of the head was obtained with 2 mm axial bone algorithm and 3 mm axial soft tissue algorithm images. No intravenous contrast was administered.   FINDINGS:  There is no evidence for a calvarial fracture. There is no evidence for an acute extra-axial hemorrhage. The ventricles, sulci, and cisterns are age appropriate. The basal ganglia and thalami are unremarkable in appearance. The posterior fossa structures are unremarkable .        No evidence for acute intracranial pathology.  Radiation dose reduction techniques were utilized, including automated exposure control and exposure modulation based on body size.  This report was finalized on 7/30/2019 2:48 PM by Dr. Néstor Eaton M.D.      Mri Brain With & Without Contrast    Result Date: 7/31/2019  MRI BRAIN W WO CONTRAST-  CLINICAL HISTORY: Head trauma. Vertigo. Syncope. Nausea. History of breast cancer.  TECHNIQUE: Multiple axial T1, T2, gradient echo and diffusion images were acquired. Axial and coronal and sagittal T1-weighted images were also obtained after intravenous injection of MultiHance contrast.  COMPARISON: MRI of the brain dated 01/24/2019.  FINDINGS: The brain and ventricular system appear structurally normal. There are a few tiny nodular foci of T2 hyperintensity scattered throughout the white matter of both cerebral hemispheres that appear quite similar on the previous MRI study. These are compatible with sequela of minimal small vessel chronic ischemic change. There is no evidence of acute infarct or hemorrhage. No foci of restricted diffusion are identified in the brain or brainstem. There  is a tiny chronic lacunar infarct in the left side of the jasbir that is new since the preceding MRI study. No other infarcts are identified. There are no masses. No foci of abnormal enhancement are identified in the brain or brainstem. The 7th and 8th nerve complexes appear within normal limits bilaterally. The paranasal sinuses are well aerated. Normal flow voids are observed in the major vascular structures.      Evidence of minimal small vessel chronic ischemic change as noted. Tiny chronic lacunar infarct in the jasbir that is new since the preceding MRI dated 01/24/2019. Otherwise unremarkable MRI of the brain. No acute abnormality is identified.  This report was finalized on 7/31/2019 5:39 PM by Dr. Sridhar Bee M.D.      Ct Angiogram Chest With Contrast    Result Date: 7/31/2019  CT ANGIOGRAM CHEST WITH CONTRAST, PULMONARY EMBOLISM PROTOCOL  HISTORY: History of syncope with positive d-dimer. Patient has history of breast cancer. Evaluate for pulmonary embolism.  TECHNIQUE: Spiral CT images were obtained from the lung apices to the diaphragmatic domes following administration of intravenous contrast in the angiographic phase. Coronal, sagittal and 3-D volume rendered reformats were then obtained.  COMPARISON: CT chest from 01/24/2019.  FINDINGS: The pulmonary arteries are well opacified with intravenous contrast.There are no convincing filling defects to suggest pulmonary artery thromboembolism. There is dilatation of the main pulmonary artery suggestive of pulmonary arterial hypertension. No pleural or pericardial effusion is seen. There is an elevated right hemidiaphragm associated with subsegmental atelectasis within the right lower lobe. There is unchanged sub-4 mm noncalcified left lower lobe nodule. Areas of linear atelectasis and/or scarring are seen within the left lower lobe. No new pulmonary nodules are identified. Marked interval decrease in right axillary lymphadenopathy that now measures up to 1.4  cm in comparison to prior measurement of 5.8 cm. Small soft tissue mass is again identified within the right breast. There has been marked interval decrease in size of the patient's large left breast mass. Homogeneous soft tissue thickening is now seen in this region measuring approximately 5.5 x 4.2 cm.  The visualized upper abdomen is unremarkable. Degenerative changes are seen within the bones. A right chest wall port has its tip within the SVC.      1.  No convincing evidence of pulmonary artery thromboembolism. 2.  Elevated right hemidiaphragm with subsegmental atelectasis within the right lower lobe. 3.  Marked interval decrease in the primary left breast mass and right axillary lymphadenopathy since prior imaging.  These findings were discussed with Dr. Neri by telephone.  Radiation dose reduction techniques were utilized, including automated exposure control and exposure modulation based on body size.  This report was finalized on 7/31/2019 12:41 PM by Dr. Lorenzo Bridges M.D.          During this visit the following were done:  Labs Reviewed []    Labs Ordered []    Radiology Reports Reviewed []    Radiology Ordered []    EKG, echo, and/or stress test reviewed []    EEG results reviewed  []    EEG reviewed and interpreted per myself   []    Discussed case with neurointerventionalist or neuroradiologist []    Referring Provider Records Reviewed []    ER Records Reviewed []    Hospital Records Reviewed []    History Obtained From Family []    Radiological images view and Interpreted per myself []    Case Discussed with referring provider []     Decision to obtain and request outside records  []        Assessment and Plan     1. Post concussive vertigo improved.    2. Small vessel disease of brain including subacute/old left pontine stroke likely secondary to HTN.   - CTA head and neck.   - Continue ASA.   - BP control.   - High dose statin.    3. Syncope, likely orthostatic vs cardiogenic.   - Cardiac work  up in progress.    Thanks,                  Electronically signed by Mir Carlson MD on 8/1/2019 at 2:29 PM

## 2019-08-01 NOTE — PLAN OF CARE
Problem: Patient Care Overview  Goal: Plan of Care Review  Outcome: Ongoing (interventions implemented as appropriate)   08/01/19 0628   Coping/Psychosocial   Plan of Care Reviewed With patient   Plan of Care Review   Progress improving   OTHER   Outcome Summary No c/o's dizziness this shift. NPO after MN for stress test this am. Sat dropped to mid 80's while sleeping. Oxygen placed @ 2LPM via NC.      Goal: Individualization and Mutuality  Outcome: Ongoing (interventions implemented as appropriate)

## 2019-08-01 NOTE — PROGRESS NOTES
"    Patient Name: Marylu Georges  :1960  59 y.o.      Patient Care Team:  Provider, No Known as PCP - Joby Coker Jr., MD as Referring Physician (General Surgery)  Elie Dixon MD as Consulting Physician (Hematology and Oncology)    Chief Complaint: follow up syncope.     Interval History: patient was walking and had syncope without warning prior to admission. Dizziness and vertigo occurred after fall and head trauma. She has been started on meclizine and feels \"night and day\" better.        Objective   Vital Signs  Temp:  [98.2 °F (36.8 °C)-98.5 °F (36.9 °C)] 98.5 °F (36.9 °C)  Heart Rate:  [53-82] 59  Resp:  [16-18] 16  BP: ()/(66-92) 108/75    Intake/Output Summary (Last 24 hours) at 2019 1533  Last data filed at 2019 1300  Gross per 24 hour   Intake 530 ml   Output --   Net 530 ml     Flowsheet Rows      First Filed Value   Admission Height  165.1 cm (65\") Documented at 2019 0821   Admission Weight  70.8 kg (156 lb) Documented at 2019 0821          Physical Exam:   General Appearance:    Alert, cooperative, in no acute distress   Lungs:     Clear to auscultation.  Normal respiratory effort and rate.      Heart:    Regular rhythm and normal rate, normal S1 and S2, no murmurs, gallops or rubs.     Chest Wall:    No abnormalities observed   Abdomen:     Soft, nontender, positive bowel sounds.     Extremities:   no cyanosis, clubbing or edema.  No marked joint deformities.  Adequate musculoskeletal strength.       Results Review:    Results from last 7 days   Lab Units 19  0350   SODIUM mmol/L 141   POTASSIUM mmol/L 4.3   CHLORIDE mmol/L 106   CO2 mmol/L 25.2   BUN mg/dL 16   CREATININE mg/dL 0.78   GLUCOSE mg/dL 108*   CALCIUM mg/dL 9.3     Results from last 7 days   Lab Units 19  0838   TROPONIN T ng/mL <0.010     Results from last 7 days   Lab Units 19  0350   WBC 10*3/mm3 5.02   HEMOGLOBIN g/dL 11.6*   HEMATOCRIT % 37.1   PLATELETS 10*3/mm3 " 231                           Medication Review:     aspirin 325 mg Oral Daily   Or      aspirin 300 mg Rectal Daily   atorvastatin 80 mg Oral Nightly   letrozole 2.5 mg Oral Daily   meclizine 25 mg Oral Q8H   sodium chloride 3 mL Intravenous Q12H   sodium chloride 3 mL Intravenous Q12H             Assessment/Plan   1. Syncope - Echo cardiogram was unremarkable. Stress with no ischemia. Will discharge with 14 day monitor.   2. Vertigo / dizziness - occurred after the syncopal episode following closed head injury. Meclizine is helping.   3. History of breast cancer - Dr. Dixon is following. She will have outpatient testing rescheduled.   4. Tiny chronic lacunar infarct in the jasbir - not sure of the significance in this clinical setting. Possible incidental finding? Will defer to internal medicine.     I think she can probably go home. I will order a Zio patch to be placed prior to discharge. She should follow up with ISAIAS Romo in 3-4 weeks.     ISAIAS Hernandez  [facility]  08/01/19  3:33 PM

## 2019-08-01 NOTE — NURSING NOTE
Request for stroke screening received today per Epic order set.  Noted PT and ST have both signed off.  Signing off re: inpatient acute rehab.  Thank you--Mirtha Humphries,  Rehab Adm Nurse

## 2019-08-01 NOTE — PROGRESS NOTES
"DAILY PROGRESS NOTE  [facility]    Patient Identification:  Name: Marylu Georges  Age: 59 y.o.  Sex: female  :  1960  MRN: [id number]         Primary Care Physician: Provider, No Known      Subjective  No c/o.  States no vertigo this afternoon.     Objective:  General Appearance:  Comfortable, well-appearing, in no acute distress and not in pain.    Vital signs: (most recent): Blood pressure 108/75, pulse 59, temperature 98.5 °F (36.9 °C), temperature source Oral, resp. rate 16, height 165 cm (64.96\"), weight 71 kg (156 lb 8.4 oz), SpO2 97 %.    Lungs:  Normal effort and normal respiratory rate.  Breath sounds clear to auscultation.    Heart: Normal rate.  Regular rhythm.    Extremities: There is no dependent edema.    Neurological: Patient is alert and oriented to person, place and time.    Skin:  Warm and dry.                Vital signs in last 24 hours:  Temp:  [98.2 °F (36.8 °C)-98.5 °F (36.9 °C)] 98.5 °F (36.9 °C)  Heart Rate:  [53-82] 59  Resp:  [16-18] 16  BP: ()/(66-92) 108/75    Intake/Output:    Intake/Output Summary (Last 24 hours) at 2019 1613  Last data filed at 2019 1300  Gross per 24 hour   Intake 530 ml   Output --   Net 530 ml         Results from last 7 days   Lab Units 19  0350 19  0838 19  0944   WBC 10*3/mm3 5.02 4.10 4.24   HEMOGLOBIN g/dL 11.6* 12.0 11.3*   PLATELETS 10*3/mm3 231 261 259     Results from last 7 days   Lab Units 19  0350 19  0838 19  0944   SODIUM mmol/L 141 141 141   POTASSIUM mmol/L 4.3 4.2 4.3   CHLORIDE mmol/L 106 105 104   CO2 mmol/L 25.2 23.9 24.9   BUN mg/dL 16 22 19   CREATININE mg/dL 0.78 0.66 0.80   GLUCOSE mg/dL 108* 134* 129*   Estimated Creatinine Clearance: 74.7 mL/min (by C-G formula based on SCr of 0.78 mg/dL).  Results from last 7 days   Lab Units 19  0350 19  0838 19  0944   CALCIUM mg/dL 9.3 9.5 9.4   ALBUMIN g/dL  --  4.30 4.30     Results from last 7 days   Lab " Units 07/30/19  0838 07/26/19  0944   ALBUMIN g/dL 4.30 4.30   BILIRUBIN mg/dL 0.3 0.4   ALK PHOS U/L 68 71   AST (SGOT) U/L 36* 26   ALT (SGPT) U/L 46* 27       Assessment:    Syncope:  Cardio eval appreciated.     Vertigo:  Post concussive. Resolving.      Malignant neoplasm of overlapping sites of left breast in female, estrogen receptor positive     Anemia in neoplastic disease    Nausea & vomiting:  Resolved.     Macrocytosis    Immunocompromised state due to drug therapy    CVA - non-acute:  W/u in progress.       Plan:  If no critical stenosis on CTA... Probably d/c tomorrow.     Joel Naranjo MD  8/1/2019  4:13 PM

## 2019-08-02 ENCOUNTER — APPOINTMENT (OUTPATIENT)
Dept: CARDIOLOGY | Facility: HOSPITAL | Age: 59
End: 2019-08-02

## 2019-08-02 VITALS
BODY MASS INDEX: 26.54 KG/M2 | WEIGHT: 159.3 LBS | OXYGEN SATURATION: 97 % | HEIGHT: 65 IN | HEART RATE: 68 BPM | RESPIRATION RATE: 18 BRPM | DIASTOLIC BLOOD PRESSURE: 101 MMHG | SYSTOLIC BLOOD PRESSURE: 161 MMHG | TEMPERATURE: 98.1 F

## 2019-08-02 LAB
CHOLEST SERPL-MCNC: 153 MG/DL (ref 0–200)
HDLC SERPL-MCNC: 48 MG/DL (ref 40–60)
LDLC SERPL CALC-MCNC: 88 MG/DL (ref 0–100)
LDLC/HDLC SERPL: 1.84 {RATIO}
TRIGL SERPL-MCNC: 84 MG/DL (ref 0–150)
VLDLC SERPL-MCNC: 16.8 MG/DL (ref 5–40)

## 2019-08-02 PROCEDURE — 99214 OFFICE O/P EST MOD 30 MIN: CPT | Performed by: NURSE PRACTITIONER

## 2019-08-02 PROCEDURE — 0296T HC EXT ECG > 48HR TO 21 DAY RCRD W/CONECT INTL RCRD: CPT

## 2019-08-02 PROCEDURE — G0378 HOSPITAL OBSERVATION PER HR: HCPCS

## 2019-08-02 PROCEDURE — 80061 LIPID PANEL: CPT | Performed by: PSYCHIATRY & NEUROLOGY

## 2019-08-02 RX ORDER — ASPIRIN 81 MG/1
81 TABLET ORAL DAILY
Qty: 30 TABLET | Refills: 0 | Status: SHIPPED | OUTPATIENT
Start: 2019-08-02 | End: 2020-03-03 | Stop reason: HOSPADM

## 2019-08-02 RX ORDER — ATORVASTATIN CALCIUM 80 MG/1
80 TABLET, FILM COATED ORAL NIGHTLY
Qty: 30 TABLET | Refills: 0 | Status: ON HOLD | OUTPATIENT
Start: 2019-08-02 | End: 2019-09-16

## 2019-08-02 RX ORDER — ASPIRIN 81 MG/1
81 TABLET, CHEWABLE ORAL DAILY
Status: DISCONTINUED | OUTPATIENT
Start: 2019-08-02 | End: 2019-08-02 | Stop reason: HOSPADM

## 2019-08-02 RX ORDER — MECLIZINE HYDROCHLORIDE 25 MG/1
25 TABLET ORAL 2 TIMES DAILY PRN
Status: DISCONTINUED | OUTPATIENT
Start: 2019-08-02 | End: 2019-08-02 | Stop reason: HOSPADM

## 2019-08-02 RX ORDER — MECLIZINE HYDROCHLORIDE 25 MG/1
25 TABLET ORAL 2 TIMES DAILY PRN
Qty: 28 TABLET | Refills: 0 | Status: SHIPPED | OUTPATIENT
Start: 2019-08-02 | End: 2020-02-18

## 2019-08-02 RX ADMIN — ACETAMINOPHEN 650 MG: 325 TABLET, FILM COATED ORAL at 07:30

## 2019-08-02 RX ADMIN — LETROZOLE 2.5 MG: 2.5 TABLET ORAL at 08:21

## 2019-08-02 RX ADMIN — SODIUM CHLORIDE, PRESERVATIVE FREE 3 ML: 5 INJECTION INTRAVENOUS at 08:21

## 2019-08-02 RX ADMIN — MECLIZINE HYDROCHLORIDE 25 MG: 25 TABLET ORAL at 06:09

## 2019-08-02 RX ADMIN — ASPIRIN 325 MG: 325 TABLET ORAL at 08:21

## 2019-08-02 NOTE — PLAN OF CARE
Problem: Patient Care Overview  Goal: Plan of Care Review  Outcome: Ongoing (interventions implemented as appropriate)   08/02/19 0502   Coping/Psychosocial   Plan of Care Reviewed With patient   Plan of Care Review   Progress no change   OTHER   Outcome Summary t is alert and oriented x4. Continue on NIH (0) Pt denies any pain or discomfort..Possible going home today.       Problem: Fall Risk (Adult)  Goal: Identify Related Risk Factors and Signs and Symptoms  Outcome: Ongoing (interventions implemented as appropriate)      Problem: Pain, Acute (Adult)  Goal: Identify Related Risk Factors and Signs and Symptoms  Outcome: Ongoing (interventions implemented as appropriate)      Problem: Syncope (Adult)  Goal: Identify Related Risk Factors and Signs and Symptoms  Outcome: Ongoing (interventions implemented as appropriate)

## 2019-08-02 NOTE — SIGNIFICANT NOTE
08/02/19 0939   Rehab Time/Intention   Evaluation Not Performed (pt denies OT eval needs.  Denies visual, coordination, sensation, strength, balance or cognitive difficulties.  Reports up on own in room.  Discuss with RN.  Full OT eval not warranted. )   Rehab Treatment   Discipline occupational therapist

## 2019-08-02 NOTE — PROGRESS NOTES
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  Doing well sitting up in the recliner waiting to go home.  Denies any new weakness, numbness, speech or visual disturbances, or headaches.  No family at bedside.    Objective:  Vital signs:      Vitals:    19 0012 19 0520 19 0725 19 0726   BP: 121/74   (!) 161/101   BP Location: Right arm      Patient Position: Lying      Pulse: 60  68    Resp: 18  18    Temp: 98 °F (36.7 °C)  98.1 °F (36.7 °C)    TempSrc: Oral  Oral    SpO2: 96%  97%    Weight:  72.3 kg (159 lb 4.8 oz)     Height:           Current Facility-Administered Medications:   •  acetaminophen (TYLENOL) tablet 650 mg, 650 mg, Oral, Q6H PRN, Mare Humphries APRN, 650 mg at 19 0730  •  aspirin tablet 325 mg, 325 mg, Oral, Daily, 325 mg at 19 0821 **OR** aspirin split suppository 300 mg, 300 mg, Rectal, Daily, Mir Carlson MD  •  atorvastatin (LIPITOR) tablet 80 mg, 80 mg, Oral, Nightly, Mir Carlson MD, 80 mg at 19  •  letrozole (FEMARA) tablet 2.5 mg, 2.5 mg, Oral, Daily, Mare Humphries APRN, 2.5 mg at 19 0821  •  magnesium hydroxide (MILK OF MAGNESIA) suspension 2400 mg/10mL 10 mL, 10 mL, Oral, Daily PRN, Mare Humphries APRN  •  meclizine (ANTIVERT) tablet 25 mg, 25 mg, Oral, BID PRN, Joel Naranjo MD  •  ondansetron (ZOFRAN) injection 4 mg, 4 mg, Intravenous, Q6H PRN, Mare Humphries APRN, 4 mg at 19 0856  •  oxyCODONE-acetaminophen (PERCOCET) 5-325 MG per tablet 1 tablet, 1 tablet, Oral, Q6H PRN, Mare Humphries APRN  •  promethazine (PHENERGAN) injection 12.5 mg, 12.5 mg, Intravenous, Q6H PRN, Joel Naranjo MD, 12.5 mg at 19 1259  •  [COMPLETED] Insert  peripheral IV, , , Once **AND** sodium chloride 0.9 % flush 10 mL, 10 mL, Intravenous, PRN, Kenneth Neri II, MD  •  sodium chloride 0.9 % flush 3 mL, 3 mL, Intravenous, Q12H, Mare Humphries APRN, 3 mL at 08/01/19 2016  •  sodium chloride 0.9 % flush 3 mL, 3 mL, Intravenous, Q12H, Mir Carlson MD, 3 mL at 08/02/19 0821  •  sodium chloride 0.9 % flush 3-10 mL, 3-10 mL, Intravenous, PRN, Mare Humphries APRN  •  sodium chloride 0.9 % flush 3-10 mL, 3-10 mL, Intravenous, PRN, Mir Carlson MD    PRN meds  •  acetaminophen  •  magnesium hydroxide  •  meclizine  •  ondansetron  •  oxyCODONE-acetaminophen  •  promethazine  •  [COMPLETED] Insert peripheral IV **AND** sodium chloride  •  sodium chloride  •  sodium chloride    No current facility-administered medications on file prior to encounter.      Current Outpatient Medications on File Prior to Encounter   Medication Sig   • diclofenac sodium (VOTAREN XR) 100 MG 24 hr tablet Take 1 tablet by mouth Daily.   • IBUPROFEN PO Take 1 tablet by mouth As Needed.   • Multiple Vitamin (MULTI VITAMIN DAILY PO) Take  by mouth.   • olopatadine (PATANOL) 0.1 % ophthalmic solution Administer 1 drop to both eyes 2 (Two) Times a Day.   • letrozole (FEMARA) 2.5 MG tablet Take 1 tablet by mouth Daily.   • metroNIDAZOLE (METROGEL) 1 % gel Apply  topically to the appropriate area as directed Daily.   • oxyCODONE-acetaminophen (PERCOCET) 5-325 MG per tablet 1 q 6 hrs prn pain       General appearance: NAD, alert and cooperative, well groomed  HEENT: Normocephalic, atraumatic, PERRL, no masses or tenderness  COR: RRR  Resp: Even and unlabored  Extremities: Equal pulses  Skin: warm, dry    Neurological:   MS: oriented x3, recent/remote memory intact, normal attention/concentration, language intact, no neglect, normal fund of knowledge  CN: visual acuity grossly normal, visual fields full, PERRL, EOMI, facial sensation equal, no facial droop, hearing  symmetric, palate elevates symmetrically, shoulder shrug equal, tongue midline  Motor: 5/5 in all 4 ext., normal tone  Reflexes: 1+ in all 4 ext.  Sensory: light touch sensation intact in all 4 ext.  Coordination: Normal finger to nose test, normal heel to shin test  Gait and station: no ataxia  Rapid alternating movements: normal finger to thumb tap    Laboratory results:  Lab Results   Component Value Date    TSH 1.370 07/30/2019     Lab Results   Component Value Date    HGBA1C 5.60 07/30/2019     Lab Results   Component Value Date    ZOCNBETI00 695 07/31/2019     Lab Results   Component Value Date    CHOL 153 08/02/2019     Lab Results   Component Value Date    TRIG 84 08/02/2019     Lab Results   Component Value Date    HDL 48 08/02/2019     Lab Results   Component Value Date    LDL 88 08/02/2019     Lab Results   Component Value Date    WBC 5.02 07/31/2019    HGB 11.6 (L) 07/31/2019    HCT 37.1 07/31/2019    MCV 96.9 07/31/2019     07/31/2019     Lab Results   Component Value Date    GLUCOSE 108 (H) 07/31/2019    BUN 16 07/31/2019    CREATININE 0.78 07/31/2019    EGFRIFNONA 75 07/31/2019    BCR 20.5 07/31/2019    K 4.3 07/31/2019    CO2 25.2 07/31/2019    CALCIUM 9.3 07/31/2019    ALBUMIN 4.30 07/30/2019    AST 36 (H) 07/30/2019    ALT 46 (H) 07/30/2019     No results found for: PTT  No results found for: INR, PROTIME  Brief Urine Lab Results     None        Folate 18.90    Review and interpretation of imaging:  Xr Chest 2 View    Result Date: 7/30/2019  Since prior chest x-ray 02/01/2019 there has been a decrease in lung volumes. The patient currently has low lung volumes with increasing elevation of the right hemidiaphragm, patchy bibasilar airspace opacities that I favor is atelectasis over pneumonia, correlate clinically. The remainder of the chest x-ray is unremarkable.  This report was finalized on 7/30/2019 9:58 AM by Dr. Berny Freeman M.D.      Ct Head Without Contrast    Result Date: 7/30/2019    No evidence for acute intracranial pathology.  Radiation dose reduction techniques were utilized, including automated exposure control and exposure modulation based on body size.  This report was finalized on 7/30/2019 2:48 PM by Dr. Néstor Eaton M.D.      Mri Brain With & Without Contrast    Result Date: 7/31/2019  Evidence of minimal small vessel chronic ischemic change as noted. Tiny chronic lacunar infarct in the jasbir that is new since the preceding MRI dated 01/24/2019. Otherwise unremarkable MRI of the brain. No acute abnormality is identified.  This report was finalized on 7/31/2019 5:39 PM by Dr. Sridhar Bee M.D.      Ct Angiogram Chest With Contrast    Result Date: 7/31/2019  1.  No convincing evidence of pulmonary artery thromboembolism. 2.  Elevated right hemidiaphragm with subsegmental atelectasis within the right lower lobe. 3.  Marked interval decrease in the primary left breast mass and right axillary lymphadenopathy since prior imaging.  These findings were discussed with Dr. Neri by telephone.  Radiation dose reduction techniques were utilized, including automated exposure control and exposure modulation based on body size.  This report was finalized on 7/31/2019 12:41 PM by Dr. Lorenzo Bridges M.D.      Results for orders placed during the hospital encounter of 07/30/19   Adult Transthoracic Echo Complete W/ Cont if Necessary Per Protocol    Narrative · Estimated EF = 66%.  · Left ventricular systolic function is normal.  · Left atrial cavity size is moderately dilated.  · Saline test results are negative.            Impression/Assessment:  This is a 59-year-old female with past medical history of breast cancer, arthritis who presented to the hospital on 7/30/2019 with complaints of a syncopal episode status post fall with head trauma.  She also had some associated nausea vomiting and positional vertigo that was treated with meclizine with relief.  Blood pressure on arrival 140/100 with a  heart rate of 80.  EKG showing normal sinus rhythm. .  CTA head/neck showing no significant stenosis within the great vessels, no aneurysm, no other acute intracranial abnormalities noted.  MRI brain obtained and revealed mild chronic small vessel disease, small chronic left pontine stroke.  2D echo revealed moderately dilated LA size, saline test negative, no mention of PFO, LV function normal, EF 66%, no aortic valve stenosis present.    1.  Syncope  2.  Postconcussive vertigo  3.  Chronic left pontine stroke  4.  Small vessel disease    Her imaging is negative, showing an old lacunar infarct that is new since prior head imaging in Jan. 2019. Blood pressure has been elevated, recommend goal less than 130/80 for stroke prevention.  Continue high-dose statin and aspirin, will decrease aspirin to 81 mg.  She will be sent home with a ZIO Patch per cardiology.  Her vertigo was treated with meclizine and she did report relief.  Unclear of her etiology of her syncope, could be cardiac related versus dehydration.  Dr. Constantino with oncology reports that the patient will have a PET scan and a DEXA scan within the next few days as an outpatient procedure.  At this time we will sign off, will see again per request. Other plans as stated below. Therapies as written. CCP for discharge planning. Call RRT for any acute neurological changes.     Plan:  Decrease aspirin to 81 mg and continue  Lipitor 80mg, LDL 88  Neurochecks per stroke protocol  Normalize BP  Stroke Education  KARINA/SCDs  PT/OT/ST  Follow up with us as needed.  We will sign off, will see again per request.    Case discussed with RN, patient, and Dr. Carlson, and he agrees with plan above.   ISAIAS Perkins

## 2019-08-02 NOTE — DISCHARGE SUMMARY
PHYSICIAN DISCHARGE SUMMARY                                                                        [facility]    Patient Identification:  Name: Marylu Georges  Age: 59 y.o.  Sex: female  :  1960  MRN: [id number]  Primary Care Physician: Provider, No Known    Admit date: 2019  Discharge date and time: 2019     Discharged Condition: good    Discharge Diagnoses:    Syncope:      Vertigo - Post concussive.    Malignant neoplasm of overlapping sites of left breast in female, estrogen receptor positive     Anemia in neoplastic disease    Nausea & vomiting:  Resolved.     Macrocytosis    Immunocompromised state due to drug therapy    CVA - non-acute:          Hospital Course:  Pleasant 59-year-old female with medical issues as noted above.  She presented after syncope episode.  Please H&P for full details.  Cardiac enzymes are negative and EKG was nonacute.  The patient was admitted to monitored unit and cardiology consultation was obtained.  Echocardiogram was also performed which was unremarkable as well as a stress Cardiolite study.  In reference to syncope she will be receiving Zio patch heart monitor and follow-up with cardiology as an outpatient.  The syncope episode was associated with striking her head and she did complain of vertigo during hospitalization.  MRI of the head was performed and it did reveal a chronic lacunar infarct in the jasbir that was not present back in January of this year.  Neurology consultation was obtained.  They did request a CT angiogram.  There is no critical stenosis noted on the study.  Presently the patient is feeling well.  She is been on meclizine and has not had any vertigo in the last 2 days.  I encouraged her to cut this back to as needed only.  She can be discharged at this point then for the remainder of her treatment follow-up as an outpatient.    Consults:     Consults     Date  and Time Order Name Status Description    8/1/2019 0927 Inpatient Neurology Consult Stroke Completed     7/30/2019 1508 Inpatient Cardiology Consult Completed     7/30/2019 1130 Hematology & Oncology Inpatient Consult      7/30/2019 1110 [facility] (on-call MD unless specified) Details Completed             Discharge Exam:  Physical Exam   Afebrile vital signs stable.  Well-developed well-nourished female no apparent distress.  Lungs clear to auscultation good air movement.  Heart regular rate and rhythm.  Extremities with no clubbing cyanosis or edema.  Alert oriented conversant cooperative and pleasant with no lateralizing signs present.     Disposition:  Home    Patient Instructions:      Discharge Medications      New Medications      Instructions Start Date   aspirin 81 MG EC tablet   81 mg, Oral, Daily      atorvastatin 80 MG tablet  Commonly known as:  LIPITOR   80 mg, Oral, Nightly      meclizine 25 MG tablet  Commonly known as:  ANTIVERT   25 mg, Oral, 2 Times Daily PRN         Continue These Medications      Instructions Start Date   letrozole 2.5 MG tablet  Commonly known as:  FEMARA   2.5 mg, Oral, Daily      metroNIDAZOLE 1 % gel  Commonly known as:  METROGEL   Topical, Daily      MULTI VITAMIN DAILY PO   Oral      olopatadine 0.1 % ophthalmic solution  Commonly known as:  PATANOL   1 drop, Both Eyes, 2 Times Daily      oxyCODONE-acetaminophen 5-325 MG per tablet  Commonly known as:  PERCOCET   1 q 6 hrs prn pain         Stop These Medications    diclofenac sodium 100 MG 24 hr tablet  Commonly known as:  VOTAREN XR     IBUPROFEN PO          Diet Instructions     Diet: Cardiac      Discharge Diet:  Cardiac        Future Appointments   Date Time Provider Department Center   8/14/2019  7:30 AM [facility] PET ADMIN RM 1  [facility]   8/14/2019  9:00 AM [facility] PET 1  [facility]   8/20/2019  1:50 PM Joby Barron Jr., MD [facility] None   8/20/2019  3:00 PM VIT[facility] ONLY [facility]  [facility]   8/20/2019  3:20 PM  Elie Dixon MD [facility]  [facility]     Additional Instructions for the Follow-ups that You Need to Schedule     Discharge Follow-up with PCP   As directed       Currently Documented PCP:    Provider, No Known    PCP Phone Number:    [phone]     Follow Up Details:  1 week         Discharge Follow-up with Specialty: Cardiology, Oncology, Neurology   As directed      Specialty:  Cardiology, Oncology, Neurology    Follow Up Details:  As directed.           Follow-up Information     Provider, No Known .    Why:  1 week  Contact information:  [place] [id number]  [phone]                 Discharge Order (From admission, onward)    Start     Ordered    08/02/19 1008  Discharge patient  Once     Expected Discharge Date:  08/02/19    Discharge Disposition:  Home or Self Care    Physician of Record for Attribution - Please select from Treatment Team:  JOEL NARANJO [[id number]]    Review needed by CMO to determine Physician of Record:  No       Question Answer Comment   Physician of Record for Attribution - Please select from Treatment Team JOEL NARANJO    Review needed by CMO to determine Physician of Record No        08/02/19 1011            Total time spent discharging patient including evaluation,post hospitalization follow up,  medication and post hospitalization instructions and education total time exceeds 30 minutes.    Signed:  Joel Naranjo MD  8/2/2019  10:17 AM    EMR Dragon/Transcription disclaimer:   Much of this encounter note is an electronic transcription/translation of spoken language to printed text. The electronic translation of spoken language may permit erroneous, or at times, nonsensical words or phrases to be inadvertently transcribed; Although I have reviewed the note for such errors, some may still exist.

## 2019-08-05 NOTE — PROGRESS NOTES
Case Management Discharge Note    Final Note: Home no additional dc orders noted. [facility]/ccp    Destination      No service has been selected for the patient.      Durable Medical Equipment      No service has been selected for the patient.      Dialysis/Infusion      No service has been selected for the patient.      Home Medical Care      No service has been selected for the patient.      Therapy      No service has been selected for the patient.      Community Resources      No service has been selected for the patient.        Transportation Services  Private: Car    Final Discharge Disposition Code: 01 - home or self-care

## 2019-08-13 ENCOUNTER — HOSPITAL ENCOUNTER (OUTPATIENT)
Dept: BONE DENSITY | Facility: HOSPITAL | Age: 59
Discharge: HOME | End: 2019-08-13
Admitting: INTERNAL MEDICINE

## 2019-08-13 DIAGNOSIS — Z80.3 FAMILY HISTORY OF BREAST CANCER IN FEMALE: ICD-10-CM

## 2019-08-13 DIAGNOSIS — R22.31 AXILLARY MASS, RIGHT: ICD-10-CM

## 2019-08-13 DIAGNOSIS — I87.8 POOR VENOUS ACCESS: ICD-10-CM

## 2019-08-13 DIAGNOSIS — D63.0 ANEMIA IN NEOPLASTIC DISEASE: ICD-10-CM

## 2019-08-13 DIAGNOSIS — Z17.0 MALIGNANT NEOPLASM OF OVERLAPPING SITES OF LEFT BREAST IN FEMALE, ESTROGEN RECEPTOR POSITIVE (HCC): ICD-10-CM

## 2019-08-13 DIAGNOSIS — Z45.2 FITTING AND ADJUSTMENT OF VASCULAR CATHETER: ICD-10-CM

## 2019-08-13 DIAGNOSIS — C50.812 MALIGNANT NEOPLASM OF OVERLAPPING SITES OF LEFT BREAST IN FEMALE, ESTROGEN RECEPTOR POSITIVE (HCC): ICD-10-CM

## 2019-08-13 PROCEDURE — 77080 DXA BONE DENSITY AXIAL: CPT

## 2019-08-14 ENCOUNTER — HOSPITAL ENCOUNTER (OUTPATIENT)
Dept: PET IMAGING | Facility: HOSPITAL | Age: 59
Discharge: HOME | End: 2019-08-14

## 2019-08-14 ENCOUNTER — HOSPITAL ENCOUNTER (OUTPATIENT)
Dept: PET IMAGING | Facility: HOSPITAL | Age: 59
Discharge: HOME | End: 2019-08-14
Admitting: INTERNAL MEDICINE

## 2019-08-14 ENCOUNTER — LAB (OUTPATIENT)
Dept: LAB | Facility: HOSPITAL | Age: 59
End: 2019-08-14

## 2019-08-14 DIAGNOSIS — R22.31 AXILLARY MASS, RIGHT: ICD-10-CM

## 2019-08-14 DIAGNOSIS — C50.812 MALIGNANT NEOPLASM OF OVERLAPPING SITES OF LEFT BREAST IN FEMALE, ESTROGEN RECEPTOR POSITIVE (HCC): ICD-10-CM

## 2019-08-14 DIAGNOSIS — I87.8 POOR VENOUS ACCESS: ICD-10-CM

## 2019-08-14 DIAGNOSIS — Z45.2 FITTING AND ADJUSTMENT OF VASCULAR CATHETER: ICD-10-CM

## 2019-08-14 DIAGNOSIS — D63.0 ANEMIA IN NEOPLASTIC DISEASE: ICD-10-CM

## 2019-08-14 DIAGNOSIS — Z80.3 FAMILY HISTORY OF BREAST CANCER IN FEMALE: ICD-10-CM

## 2019-08-14 DIAGNOSIS — Z17.0 MALIGNANT NEOPLASM OF OVERLAPPING SITES OF LEFT BREAST IN FEMALE, ESTROGEN RECEPTOR POSITIVE (HCC): ICD-10-CM

## 2019-08-14 LAB
ALBUMIN SERPL-MCNC: 4.6 G/DL (ref 3.5–5.2)
ALBUMIN/GLOB SERPL: 1.9 G/DL (ref 1.1–2.4)
ALP SERPL-CCNC: 96 U/L (ref 38–116)
ALT SERPL W P-5'-P-CCNC: 40 U/L (ref 0–33)
ANION GAP SERPL CALCULATED.3IONS-SCNC: 12.5 MMOL/L (ref 5–15)
AST SERPL-CCNC: 32 U/L (ref 0–32)
BASOPHILS # BLD AUTO: 0.05 10*3/MM3 (ref 0–0.2)
BASOPHILS NFR BLD AUTO: 1.1 % (ref 0–1.5)
BILIRUB SERPL-MCNC: 0.3 MG/DL (ref 0.2–1.2)
BUN BLD-MCNC: 17 MG/DL (ref 6–20)
BUN/CREAT SERPL: 22.7 (ref 7.3–30)
CALCIUM SPEC-SCNC: 9.6 MG/DL (ref 8.5–10.2)
CANCER AG15-3 SERPL-ACNC: 17.3 U/ML
CHLORIDE SERPL-SCNC: 101 MMOL/L (ref 98–107)
CO2 SERPL-SCNC: 27.5 MMOL/L (ref 22–29)
CREAT BLD-MCNC: 0.75 MG/DL (ref 0.6–1.1)
DEPRECATED RDW RBC AUTO: 53 FL (ref 37–54)
EOSINOPHIL # BLD AUTO: 0.2 10*3/MM3 (ref 0–0.4)
EOSINOPHIL NFR BLD AUTO: 4.6 % (ref 0.3–6.2)
ERYTHROCYTE [DISTWIDTH] IN BLOOD BY AUTOMATED COUNT: 14.6 % (ref 12.3–15.4)
GFR SERPL CREATININE-BSD FRML MDRD: 79 ML/MIN/1.73
GLOBULIN UR ELPH-MCNC: 2.4 GM/DL (ref 1.8–3.5)
GLUCOSE BLD-MCNC: 65 MG/DL (ref 74–124)
GLUCOSE BLDC GLUCOMTR-MCNC: 90 MG/DL (ref 70–130)
HCT VFR BLD AUTO: 42.2 % (ref 34–46.6)
HGB BLD-MCNC: 12.9 G/DL (ref 12–15.9)
IMM GRANULOCYTES # BLD AUTO: 0.01 10*3/MM3 (ref 0–0.05)
IMM GRANULOCYTES NFR BLD AUTO: 0.2 % (ref 0–0.5)
LYMPHOCYTES # BLD AUTO: 0.77 10*3/MM3 (ref 0.7–3.1)
LYMPHOCYTES NFR BLD AUTO: 17.6 % (ref 19.6–45.3)
MCH RBC QN AUTO: 30.1 PG (ref 26.6–33)
MCHC RBC AUTO-ENTMCNC: 30.6 G/DL (ref 31.5–35.7)
MCV RBC AUTO: 98.4 FL (ref 79–97)
MONOCYTES # BLD AUTO: 0.35 10*3/MM3 (ref 0.1–0.9)
MONOCYTES NFR BLD AUTO: 8 % (ref 5–12)
NEUTROPHILS # BLD AUTO: 2.99 10*3/MM3 (ref 1.7–7)
NEUTROPHILS NFR BLD AUTO: 68.5 % (ref 42.7–76)
NRBC BLD AUTO-RTO: 0 /100 WBC (ref 0–0.2)
PLATELET # BLD AUTO: 286 10*3/MM3 (ref 140–450)
PMV BLD AUTO: 9.3 FL (ref 6–12)
POTASSIUM BLD-SCNC: 3.8 MMOL/L (ref 3.5–4.7)
PROT SERPL-MCNC: 7 G/DL (ref 6.3–8)
RBC # BLD AUTO: 4.29 10*6/MM3 (ref 3.77–5.28)
SODIUM BLD-SCNC: 141 MMOL/L (ref 134–145)
WBC NRBC COR # BLD: 4.37 10*3/MM3 (ref 3.4–10.8)

## 2019-08-14 PROCEDURE — 86300 IMMUNOASSAY TUMOR CA 15-3: CPT | Performed by: INTERNAL MEDICINE

## 2019-08-14 PROCEDURE — 36415 COLL VENOUS BLD VENIPUNCTURE: CPT

## 2019-08-14 PROCEDURE — 85025 COMPLETE CBC W/AUTO DIFF WBC: CPT

## 2019-08-14 PROCEDURE — 78815 PET IMAGE W/CT SKULL-THIGH: CPT

## 2019-08-14 PROCEDURE — 0 FLUDEOXYGLUCOSE F18 SOLUTION: Performed by: INTERNAL MEDICINE

## 2019-08-14 PROCEDURE — 82962 GLUCOSE BLOOD TEST: CPT

## 2019-08-14 PROCEDURE — 80053 COMPREHEN METABOLIC PANEL: CPT

## 2019-08-14 PROCEDURE — A9552 F18 FDG: HCPCS | Performed by: INTERNAL MEDICINE

## 2019-08-14 RX ADMIN — FLUDEOXYGLUCOSE F18 1 DOSE: 300 INJECTION INTRAVENOUS at 07:28

## 2019-08-20 ENCOUNTER — APPOINTMENT (OUTPATIENT)
Dept: LAB | Facility: HOSPITAL | Age: 59
End: 2019-08-20

## 2019-08-20 ENCOUNTER — OFFICE VISIT (OUTPATIENT)
Dept: SURGERY | Facility: CLINIC | Age: 59
End: 2019-08-20

## 2019-08-20 ENCOUNTER — OFFICE VISIT (OUTPATIENT)
Dept: ONCOLOGY | Facility: CLINIC | Age: 59
End: 2019-08-20

## 2019-08-20 VITALS
WEIGHT: 161.5 LBS | RESPIRATION RATE: 16 BRPM | HEIGHT: 64 IN | DIASTOLIC BLOOD PRESSURE: 90 MMHG | SYSTOLIC BLOOD PRESSURE: 138 MMHG | BODY MASS INDEX: 27.57 KG/M2 | HEART RATE: 66 BPM | OXYGEN SATURATION: 96 % | TEMPERATURE: 98.5 F

## 2019-08-20 VITALS — HEART RATE: 76 BPM | WEIGHT: 162.2 LBS | OXYGEN SATURATION: 98 % | BODY MASS INDEX: 26.99 KG/M2

## 2019-08-20 DIAGNOSIS — C50.812 MALIGNANT NEOPLASM OF OVERLAPPING SITES OF LEFT BREAST IN FEMALE, ESTROGEN RECEPTOR POSITIVE (HCC): Primary | ICD-10-CM

## 2019-08-20 DIAGNOSIS — Z45.2 FITTING AND ADJUSTMENT OF VASCULAR CATHETER: ICD-10-CM

## 2019-08-20 DIAGNOSIS — Z17.0 MALIGNANT NEOPLASM OF OVERLAPPING SITES OF LEFT BREAST IN FEMALE, ESTROGEN RECEPTOR POSITIVE (HCC): Primary | ICD-10-CM

## 2019-08-20 DIAGNOSIS — D63.0 ANEMIA IN NEOPLASTIC DISEASE: ICD-10-CM

## 2019-08-20 DIAGNOSIS — I87.8 POOR VENOUS ACCESS: ICD-10-CM

## 2019-08-20 PROBLEM — Z79.899 IMMUNOCOMPROMISED STATE DUE TO DRUG THERAPY: Status: RESOLVED | Noted: 2019-07-30 | Resolved: 2019-08-20

## 2019-08-20 PROBLEM — D84.821 IMMUNOCOMPROMISED STATE DUE TO DRUG THERAPY (HCC): Status: RESOLVED | Noted: 2019-07-30 | Resolved: 2019-08-20

## 2019-08-20 PROBLEM — R11.2 NAUSEA & VOMITING: Status: RESOLVED | Noted: 2019-07-30 | Resolved: 2019-08-20

## 2019-08-20 PROCEDURE — 99214 OFFICE O/P EST MOD 30 MIN: CPT | Performed by: SURGERY

## 2019-08-20 PROCEDURE — 99215 OFFICE O/P EST HI 40 MIN: CPT | Performed by: INTERNAL MEDICINE

## 2019-08-20 PROCEDURE — G0463 HOSPITAL OUTPT CLINIC VISIT: HCPCS | Performed by: INTERNAL MEDICINE

## 2019-08-20 RX ORDER — METOPROLOL SUCCINATE 25 MG/1
25 TABLET, EXTENDED RELEASE ORAL DAILY
Qty: 30 TABLET | Refills: 6 | Status: SHIPPED | OUTPATIENT
Start: 2019-08-20 | End: 2020-02-12 | Stop reason: SDUPTHER

## 2019-08-20 RX ORDER — CEPHALEXIN 500 MG/1
CAPSULE ORAL
Refills: 0 | COMMUNITY
Start: 2019-08-08 | End: 2019-10-28

## 2019-08-20 NOTE — PROGRESS NOTES
Subjective     REASON FOR FOLLOW UP:  1. 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 R BREAST MASS WITH GIANT R AXILLARY ADENOPATHY.  3. FAMILY HISTORY OF BREAST CANCER IN MOTHER AND SISTER, SISTER WAS TESTED FOR BRCA AND WAS NEGATIVE.                                       History of Present Illness   This patient returns today to the office for followup after we reviewed her back several weeks ago after she completed her plan of chemotherapy in the background of neoadjuvant therapy for breast cancer that was neglected on the left side having a tumoral lesion also in the right breast and right axilla and having an ulcerated, neglected breast cancer that produced 3 times the size of a normal breast. The patient completed all her treatment without any inconveniences or complications and she had a very dramatic clinical response that has been confirmed through a PET scan that has been recently done. In the meantime, the patient was admitted to the hospital with a syncopal episode. We never could clarify the reason for this short of having documented a small stroke that probably was associated with hypertension. The patient was not given any blood pressure medication after this discharge but was given Lipitor by Cardiology. This medicine produced tremendous degree of muscle pain and she discontinued this with resolution of her symptoms in 48 hours. The patient is here today still with elevation of her blood pressure but actually feeling terrific. She is on Femara. She has not had any dizziness and no vertigo. She has to take a small dose of meclizine every morning to minimize vertigo. Her appetite has been excellent. Her weight is stable. Her bowel function and urination are normal. She has no issues in regard to her left breast with no induration, ulceration or bleeding. Complete resolution of the large mass as well as right mass axilla.     She had a PET scan in between for review  today.                                  Past Medical History:   Diagnosis Date   • Arthritis    • Breast cancer (CMS/HCC)     Left   • Hip pain     RIGHT HIP... CYST   • History of fracture of leg 1987   • Skin sore     OPEN SORE LEFT BREAST           Past Surgical History:   Procedure Laterality Date   • FRACTURE SURGERY  1987    Leg   • HARDWARE REMOVAL     • VENOUS ACCESS DEVICE (PORT) INSERTION Right 2/1/2019    Procedure: INSERTION VENOUS ACCESS DEVICE;  Surgeon: Joby Barron Jr., MD;  [place] [facility] [place] [facility] [place]  Service: General        Current Outpatient Medications on File Prior to Visit   Medication Sig Dispense Refill   • aspirin 81 MG EC tablet Take 1 tablet by mouth Daily. 30 tablet 0   • cephalexin (KEFLEX) 500 MG capsule Pt to only take this medicine as needed  0   • Coenzyme Q10 (CO Q 10 PO) Take  by mouth.     • letrozole (FEMARA) 2.5 MG tablet Take 1 tablet by mouth Daily. 30 tablet 6   • meclizine (ANTIVERT) 25 MG tablet Take 1 tablet by mouth 2 (Two) Times a Day As Needed for dizziness. 28 tablet 0   • Multiple Vitamin (MULTI VITAMIN DAILY PO) Take  by mouth.     • olopatadine (PATANOL) 0.1 % ophthalmic solution Administer 1 drop to both eyes 2 (Two) Times a Day. 5 mL 5   • atorvastatin (LIPITOR) 80 MG tablet Take 1 tablet by mouth Every Night. 30 tablet 0   • [DISCONTINUED] metroNIDAZOLE (METROGEL) 1 % gel Apply  topically to the appropriate area as directed Daily. 1 tube 1   • [DISCONTINUED] oxyCODONE-acetaminophen (PERCOCET) 5-325 MG per tablet 1 q 6 hrs prn pain 20 tablet 0     No current facility-administered medications on file prior to visit.         ALLERGIES:    Allergies   Allergen Reactions   • Erythromycin GI Intolerance   • Levaquin [Levofloxacin] GI Intolerance   • Penicillins GI Intolerance        Social History     Socioeconomic History   • Marital status:      Spouse name: Cruz   • Number of children: 0   • Years of education: Not on file   • Highest education level: Not  on file   Occupational History   • Occupation:      Employer: SELF-EMPLOYED   Social Needs   • Financial resource strain: Not hard at all   • Food insecurity:     Worry: Never true     Inability: Never true   • Transportation needs:     Medical: No     Non-medical: No   Tobacco Use   • Smoking status: Never Smoker   • Smokeless tobacco: Never Used   Substance and Sexual Activity   • Alcohol use: Yes     Alcohol/week: 4.2 oz     Types: 4 Glasses of wine, 3 Cans of beer per week     Frequency: 2-3 times a week     Drinks per session: 1 or 2     Binge frequency: Never     Comment: OCCASIONALLY, NONE IN LAST TWO DAYS   • Drug use: No   • Sexual activity: Not Currently     Partners: Male     Birth control/protection: Post-menopausal   Lifestyle   • Physical activity:     Days per week: 7 days     Minutes per session: 120 min   • Stress: Only a little        Family History   Problem Relation Age of Onset   • Lung cancer Father    • Cancer Mother    • Breast cancer Mother    • Liver disease Mother    • Breast cancer Sister 48   • Breast cancer Maternal Grandmother    • Breast cancer Paternal Grandmother    • Breast cancer Maternal Uncle    • Malig Hyperthermia Neg Hx         Review of Systems       General: no fever, no chills, no fatigue,no weight changes, no lack of appetite.  Eyes: no epiphora, xerophthalmia,conjunctivitis, pain, glaucoma, blurred vision, blindness, secretion, photophobia, proptosis, diplopia.  Ears: no otorrhea, tinnitus, otorrhagia, deafness, pain, vertigo.  Nose: no rhinorrhea, no epistaxis, no alteration in perception of odors, no sinuses pressure.  Mouth: no alteration in gums or teeth,  No ulcers, no difficulty with mastication or deglut ion, no odynophagia.  Neck: no masses or pain, no thyroid alterations, no pain in muscles or arteries, no carotid odynia, no crepitation.  Respiratory: no cough, no sputum production,no dyspnea,no trepopnea, no pleuritic pain,no hemoptysis.  Heart: no  "syncope, no irregularity, no palpitations, no angina,no orthopnea,no paroxysmal nocturnal dyspnea.  Vascular Venous: no tenderness,no edema,no palpable cords,no postphlebitic syndrome, no skin changes no ulcerations.  Vascular Arterial: no distal ischemia, noclaudication, no gangrene, no neuropathic ischemic pain, no skin ulcers, no paleness no cyanosis.  GI: no dysphagia, no odynophagia, no regurgitation, no heartburn,no indigestion,no nausea,no vomiting,no hematemesis ,no melena,no jaundice,no distention, no obstipation,no enterorrhagia,no proctalgia,no anal  lesions, no changes in bowel habits.  : no frequency, no hesitancy, no hematuria, no discharge,no  pain.  Musculoskeletal: no muscle or tendon pain or inflammation,no  joint pain, no edema, no functional limitation,no fasciculations, no mass.  Neurologic: no headache, no seizures, noalterations on Craneal nerves, no motor deficit, no sensory deficit, normal coordination, no alteration in memory,normal orientation, calculation,normal writting, verbal and written language.  Skin: no rashes,no pruritus no localized lesions.  Psychiatric: no anxiety, no depression,no agitation, no delusions, proper insight.                Objective     Vitals:    08/20/19 1534   BP: 138/90   Pulse: 66   Resp: 16   Temp: 98.5 °F (36.9 °C)   TempSrc: Oral   SpO2: 96%   Weight: 73.3 kg (161 lb 8 oz)   Height: 163 cm (64.17\")   PainSc: 6  Comment: neck pain     Current Status 8/20/2019   ECOG score 0       Physical Exam   GENERAL:  Well-developed, well-nourished  Patient  in no acute distress.   SKIN:  Warm, dry ,NO rashes,NO purpura ,NO petechiae.  HEENT:  Pupils were equal and reactive to light and accomodation, conjunctivas non injected, no pterigion, normal extraocular movements, normal visual acuity.   Mouth mucosa was moist, no exudates in oropharynx, normal gum line, normal roof of the mouth and pillars, normal papillations of the tongue.  NECK:  Supple with good range of " motion; no thyromegaly or masses, no JVD or bruits, no cervical adenopathies.No carotid arteries pain, no carotid abnormal pulsation , NO arterial dance.  LYMPHATICS:  No cervical, NO supraclavicular, NO axillary,NO epitrochlear , NO inguinal adenopathy.  CHEST:  Normal excursion of both luz thoraces, normal voice fremitus, no subcutaneous emphysema, normal axillas, no rashes or acanthosis nigricans. Lungs clear to percussion and auscultation, normal breath sounds bilaterally, no wheezing,NO crackles NO ronchi, NO stridor, NO rubs.  CARDIAC AND VASCULAR:  normal rate and regular rhythm, without murmurs,NO rubs NO S3 NO S4 right or left . Normal femoral, popliteal, pedis, brachial and carotid pulses.  ABDOMEN:  Soft, nontender with no organomegaly or masses, no ascites, no collateral circulation,no distention,no Vernon sign, no abdominal pain, no inguinal hernias,no umbilical hernia, no abdominal bruits. Normal bowel sounds.  GENITAL: Not  Performed.  EXTREMITIES  AND SPINE:  No clubbing, cyanosis or edema, no deformities or pain .No kyphosis, scoliosis, deformities or pain in spine, ribs or pelvic bone.  NEUROLOGICAL:  Patient was awake, alert, oriented to time, person and place.  The re-examination disclosed no palpable masses on the right side; resolution of the right axilla adenopathy and dramatic improvement in the left breast with normality in the size of the breast. Still an area of induration in the upper-outer quadrant with no left axillary adenopathy.     I have reviewed the recent MRI of her brain that shows no evidence of brain metastasis; just a small lacunar stroke in the jasbir. This is relatively new back in 01/2019 at the time of her cancer diagnosis when she had an MRI of the brain and this area was not there. If this area is the one that is producing the vertigo-like syndrome remains to be seen. Nevertheless, the patient is taking aspirin. She could not take any Lipitor. That triggered dramatic  amount of muscle pain and her blood pressure remains elevated.     RECOMMENDATIONS:   1. The patient already is scheduled for 09/16/2019 for her bilateral mastectomies and axillary lymph node sampling. She is ready to go from this point of view with Dr. Barron.   2. The port will be flushed in 2 months when she returns to see me and review the pathology.   3. The patient will remain on her Femara 2.5 mg a day. I asked her to hold off on this medication 5 days before the mastectomy for just simple precautions. This will have no implications from the point of view of anesthesia or surgery per se.   4. In preparation for surgery, I asked her to hold off her anti-inflammatory medication and her aspirin at least 7 days before the procedure.   5. In order to take care of her blood pressure, I have sent a prescription for Toprol-XL 25 mg p.o. daily. I pointed out that this medicine will remain ongoing to be protected in regard to raises in her blood pressure.     As I previously noticed, we will review the pathology at the time of completion of her surgery. Eventually she will be referred to the [facility] and also she will be referred for radiation therapy.     The port will be flushed in a couple of months as stated above.     I discussed all these facts with the patient and her daughter. We reviewed the PET scan together in the PACS system at [facility] and shows a very dramatic response in comparison with original CT scan.     The patient was ready to proceed.     Finally, I reviewed with her her DEXA scan that shows osteopenia. Eventually she will be placed on vitamin D 1000 units a day that she is already taking and eventually will work into Prolia to begin after completion of her mastectomies.                      RECENT LABS:  Hematology WBC   Date Value Ref Range Status   08/14/2019 4.37 3.40 - 10.80 10*3/mm3 Final     RBC   Date Value Ref Range Status   08/14/2019 4.29 3.77 - 5.28 10*6/mm3  Final     Hemoglobin   Date Value Ref Range Status   08/14/2019 12.9 12.0 - 15.9 g/dL Final     Hematocrit   Date Value Ref Range Status   08/14/2019 42.2 34.0 - 46.6 % Final     Platelets   Date Value Ref Range Status   08/14/2019 286 140 - 450 10*3/mm3 Final        F-18 FDG PET FROM SKULL BASE TO MID THIGH WITH PET/CT FUSION     HISTORY: Breast cancer     TECHNIQUE: Radiation dose reduction techniques were utilized, including  automated exposure control and exposure modulation based on body size.   Blood glucose level at time of injection was 90 mg/dL.  5 mCi of F-18  FDG were injected and PET was performed from skull base to mid thigh. CT  was obtained for localization and attenuation correction. Time at  injection 0728. PET start time 0847.      Compared with CT chest, abdomen and pelvis 01/24/1918, CTA chest  07/30/2019,  CTA head and neck 08/01/2019.      FINDINGS:      There is no cervical adenopathy demonstrating FDG uptake above that of  blood pool. The parotid, submandibular and thyroid glands roughly  symmetric FDG uptake.     Right Port-A-Cath tip terminates in the superior vena cava. There is no  hilar or mediastinal adenopathy demonstrating FDG uptake above that of  blood pool. The mildly enlarged right axillary adenopathy measures up to  1.1 cm in short axis dimension (previously 3.9 cm on 01/24/2019) with a  maximum SUV below that of blood pool at 2.2.     The infiltrative soft tissue mass with overlying skin thickening  involving the left breast has also decreased in size since 01/24/2019  measuring approximately 9.6 x 3.5 cm (previously 12.3 x 8.8 cm) and  demonstrates moderate FDG uptake with a max SUV of 4.7.     The heart is normal in size.     There is asymmetric elevation of the right hemidiaphragm with right  basilar pulmonary opacification favored to represent atelectasis,  grossly unchanged since 07/30/2019. There is no pulmonary effusion or  pneumothorax. No FDG avid pulmonary nodule is  visualized.     No focal FDG avid lesion is visualized within the liver, pancreas,  spleen, adrenal glands or kidneys. Subcentimeter hypodense lesion within  the left hepatic lobe is too small to characterize and grossly unchanged  since 01/24/2019. There is no hydronephrosis. Colonic diverticulosis is  present. There is no free intraperitoneal air or fluid. Left adnexal  cystic lesion is unchanged since 01/24/2019 and does not demonstrate FDG  uptake above that of blood pool. There is no abdominal or pelvic  adenopathy demonstrating FDG uptake above that of blood pool.     There are no findings of small bowel obstruction. The appendix is  unremarkable.     Mild increased FDG uptake is present within the bilateral hips as well  as the superior aspect of the right acetabulum bilaterally, right  greater than left, with associated subchondral cystic formation and  sclerosis and bone-on-bone joint space narrowing on the right and mild  joint space narrowing on the left, as seen on 01/24/2019 and likely  reactive. The degree of joint space loss within the right hip appears to  have worsened since 01/24/2019 Additionally, there is focal increased  FDG uptake at the insertion of the gluteal and hamstring and ischial  tuberosities, respectively, which is also likely reactive given location  and absence of visualized abnormality on noncontrast CT.     There are no FDG avid lytic or blastic osseous lesions.     IMPRESSION:  1.  Infiltrative soft tissue mass within the left breast which has  significantly decreased in size since 01/24/2019 and demonstrates  moderate FDG uptake representing the patient's known malignancy. Right  axillary adenopathy has also significantly decreased in size since  01/24/2019 and demonstrates mild FDG uptake which is below that of blood  pool.  2.  No findings of FDG avid disease in the neck or abdomen. FDG uptake  within the bilateral hips, right greater than left, with associated  findings of  osteoarthritis, as seen on 01/24/2019 and likely  representing degenerative changes. The degree of joint space narrowing  within the right hip has progressed since 01/24/2019.  3.  Other findings as above.     This report was finalized on 8/16/2019 2:11 PM by Dr. Myron Wilkins M.D.    MRI BRAIN W WO CONTRAST-     CLINICAL HISTORY: Head trauma. Vertigo. Syncope. Nausea. History of  breast cancer.     TECHNIQUE: Multiple axial T1, T2, gradient echo and diffusion images  were acquired. Axial and coronal and sagittal T1-weighted images were  also obtained after intravenous injection of MultiHance contrast.      COMPARISON: MRI of the brain dated 01/24/2019.     FINDINGS: The brain and ventricular system appear structurally normal.  There are a few tiny nodular foci of T2 hyperintensity scattered  throughout the white matter of both cerebral hemispheres that appear  quite similar on the previous MRI study. These are compatible with  sequela of minimal small vessel chronic ischemic change. There is no  evidence of acute infarct or hemorrhage. No foci of restricted diffusion  are identified in the brain or brainstem. There is a tiny chronic  lacunar infarct in the left side of the jasbir that is new since the  preceding MRI study. No other infarcts are identified. There are no  masses. No foci of abnormal enhancement are identified in the brain or  brainstem. The 7th and 8th nerve complexes appear within normal limits  bilaterally. The paranasal sinuses are well aerated. Normal flow voids  are observed in the major vascular structures.     IMPRESSION:  Evidence of minimal small vessel chronic ischemic change as  noted. Tiny chronic lacunar infarct in the jasbir that is new since the  preceding MRI dated 01/24/2019. Otherwise unremarkable MRI of the brain.  No acute abnormality is identified.     This report was finalized on 7/31/2019 5:39 PM by Dr. 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 At least for the last 4 years, she has had an in crescendo mass in the left breast that has produced a gigantic tumor that is now close to 25 cm in size and is replacing most of the anatomy of the left breast. There are areas of ulceration necrosis and bleeding and the mass is fixed to the chest wall. She has abundant amount of collateral circulation in the left anterior chest wall and it caught my attention the lack of any left axillary adenopathy. She has no lymphedema in the left upper extremity. In the right breast while in sitting position, no palpable abnormalities are documented. The right breast skin and nipple are normal. When the patient lies flat, there is a palpable mass at 9 o'clock from the nipple that measures probably 4 cm in size, very indistinct in regard to edges and margins. There was no mobility and no areas of tenderness. She has a giant adenopathy in the right axilla that measures close to 9 cm in size, uniform, no fluctuation, nontender, completely fixed to the axillary wall. There is no compromise of the skin.         Since the previous visit the patient has completed all of her plan of chemotherapy neoadjuvantly. She has had a very dramatic response not only to the primary tumor in the left breast, her left breast remind ourselves was 3 times the normal size and had an ulcerated mass that was huge with bleeding and necrosis. This has resolved. She had no left axillary adenopathy but she had an induration in the right breast and most importantly she had almost a 7 cm mass in the right axilla. Since completion of chemotherapy all of these issues have resolved near completely. She has had residual mass behind the scar in the left breast that measures close to 4 cm, no left axillary adenopathy and near complete resolution of the right axillary  adenopathy. No nodularity in the right breast.     The rest of her physical exam is normal.     Her white count, hemoglobin and platelets are normal.    She has not had any leftover neuropathy.     RECOMMENDATIONS: Under the present circumstances I have advised the patient and her brother the followin. Now that she has completed her neoadjuvant chemotherapy the patient will be fine to proceed with definitive hormonal therapy that she will take for the next 10 years in the form of Femara at the dose of 2.5 mg a day. Side-effects of the medicine were discussed including joint pain that is transitory and improves with physical activity as well as osteopenia or osteoporosis aggravation. In preparation for this the patient will have a bone density test. A prescription will be sent to her pharmacy.   2. The patient is going to proceed with a PET scan. This will give us an idea about the status of disease in both breast, both axillas and the rest of her body. In the past she had localized disease into both breasts and right axilla. This will be done in the next few days.   3. The patient was made an appointment to see Dr. Barron her surgeon in order to review the PET scan and thereafter proceed with scheduled bilateral mastectomies and bilateral axillary lymph node removal.    I made her an appointment to come back and see me after the PET scan is done.  4. I went ahead and scheduled a DEXA scan .  5. The patient is no longer requiring any prednisone or Zofran.  6. Her port will be maintained by us with flushing every 6 weeks for the next 3 years.  7. Eventually upon completion of her mastectomy we will review the pathology and define if the patient could be a candidate to have any further therapy depending on the findings.      I discussed all of these facts with the patient and her brother in detail and she was ready to proceed.     I also asked her to buy a multivitamin skin, hair and nails that could be beneficial  in regard to nail growth and hair growth under the present circumstances.

## 2019-08-21 NOTE — PROGRESS NOTES
Subjective   Marylu Georges is a 59 y.o. female who returns to the office in follow-up of left breast cancer.    History of Present Illness     The patient was first seen in our office on 1/17/2019 with a 2-year history of a left breast mass that was neglected and slowly group.  By the time she was seen she had a large ulcerated mass of the left breast that was draining and bleeding.  She also had a right breast mass with a large right axillary lymph node.  Based on the advanced nature of her breast cancer she was sent to oncology.  She was treated with chemotherapy using Taxol and had a significant response.  She has had near complete resolution of the left breast ulcerated mass as well as the right axillary lymphadenopathy.  Her most recent PET scan continues to show uptake in the left breast but significantly improved.  There is mild uptake in the right axilla.    Review of Systems   Constitutional: Negative for activity change, appetite change, fatigue and fever.   HENT: Negative for trouble swallowing and voice change.    Respiratory: Negative for chest tightness and shortness of breath.    Cardiovascular: Negative for chest pain and palpitations.   Gastrointestinal: Negative for abdominal pain, blood in stool, constipation, diarrhea, nausea and vomiting.   Endocrine: Negative for cold intolerance and heat intolerance.   Genitourinary: Negative for dysuria and flank pain.   Neurological: Positive for light-headedness. Negative for dizziness.   Hematological: Positive for adenopathy. Does not bruise/bleed easily.   Psychiatric/Behavioral: Negative for agitation and confusion.     Past Medical History:   Diagnosis Date   • Arthritis    • Breast cancer (CMS/HCC)     Left   • Hip pain     RIGHT HIP... CYST   • History of fracture of leg 1987   • Skin sore     OPEN SORE LEFT BREAST     Past Surgical History:   Procedure Laterality Date   • FRACTURE SURGERY  1987    Leg   • HARDWARE REMOVAL     • VENOUS ACCESS  DEVICE (PORT) INSERTION Right 2/1/2019    Procedure: INSERTION VENOUS ACCESS DEVICE;  Surgeon: Joby Barron Jr., MD;  [facility] [facility] [place] [facility] [place]  Service: General     Family History   Problem Relation Age of Onset   • Lung cancer Father    • Cancer Mother    • Breast cancer Mother    • Liver disease Mother    • Breast cancer Sister 48   • Breast cancer Maternal Grandmother    • Breast cancer Paternal Grandmother    • Breast cancer Maternal Uncle    • Malig Hyperthermia Neg Hx      Social History     Socioeconomic History   • Marital status:      Spouse name: Cruz   • Number of children: 0   • Years of education: Not on file   • Highest education level: Not on file   Occupational History   • Occupation:      Employer: SELF-EMPLOYED   Social Needs   • Financial resource strain: Not hard at all   • Food insecurity:     Worry: Never true     Inability: Never true   • Transportation needs:     Medical: No     Non-medical: No   Tobacco Use   • Smoking status: Never Smoker   • Smokeless tobacco: Never Used   Substance and Sexual Activity   • Alcohol use: Yes     Alcohol/week: 4.2 oz     Types: 4 Glasses of wine, 3 Cans of beer per week     Frequency: 2-3 times a week     Drinks per session: 1 or 2     Binge frequency: Never     Comment: OCCASIONALLY, NONE IN LAST TWO DAYS   • Drug use: No   • Sexual activity: Not Currently     Partners: Male     Birth control/protection: Post-menopausal   Lifestyle   • Physical activity:     Days per week: 7 days     Minutes per session: 120 min   • Stress: Only a little       Objective   Physical Exam   Constitutional: She is oriented to person, place, and time. She appears well-developed and well-nourished.  Non-toxic appearance.   HENT:   Head: Normocephalic and atraumatic.   Eyes: EOM are normal. No scleral icterus.   Neck: Normal range of motion. Neck supple.   Pulmonary/Chest: Effort normal. No respiratory distress.   Right breast: Normal appearance with no  palpable nodules and no palpable axillary lymphadenopathy.  Left breast: Scarring of the upper outer quadrant with no residual mass but thickening palpable.  There is no axillary lymphadenopathy.   Abdominal: Normal appearance.   Neurological: She is alert and oriented to person, place, and time.   Skin: Skin is warm and dry.   Psychiatric: She has a normal mood and affect. Her behavior is normal. Judgment and thought content normal.       Assessment/Plan       The encounter diagnosis was Malignant neoplasm of overlapping sites of left breast in female, estrogen receptor positive (CMS/HCC).    The patient has a significant left breast cancer and likely a right breast cancer that have responded quite well to neoadjuvant chemotherapy.  She is ready to proceed with surgical management.  She has been scheduled for bilateral mastectomies with bilateral sentinel lymph node biopsies.  The patient understands the indications, alternatives, risks, and benefits of the procedure and wishes to proceed.

## 2019-08-23 PROCEDURE — 0298T HOLTER MONITOR - 72 HOUR UP TO 21 DAY: CPT | Performed by: INTERNAL MEDICINE

## 2019-08-28 ENCOUNTER — TELEPHONE (OUTPATIENT)
Dept: CARDIOLOGY | Facility: CLINIC | Age: 59
End: 2019-08-28

## 2019-08-28 NOTE — TELEPHONE ENCOUNTER
I called patient to discuss monitor results but she did not answer.  Left a voicemail asking her to call the office back.      Zio patch monitor showed 5 or 6 runs of paroxysmal atrial tachycardia lasting 4-6 beats.  No clear atrial fibrillation seen.  No sustained bradycardia or tachyarrhythmias seen.

## 2019-08-28 NOTE — TELEPHONE ENCOUNTER
Patient has been started on metoprolol XL since the monitor was turned in.   She reports her cancer doctor started her on this for elevated BP.    She reports she drinks about 1-2 cups of coffee a day.  Recommend limiting caffeine.    She does not feel she has sleep apnea.  She thinks she may snore but before this last hospitalization reports she slept well and did not have morning fatigue.  Asked that she ask family members if they have ever witnessed any apnea or snoring.  Will discuss more at upcoming appointment.

## 2019-08-30 ENCOUNTER — OFFICE VISIT (OUTPATIENT)
Dept: CARDIOLOGY | Facility: CLINIC | Age: 59
End: 2019-08-30

## 2019-08-30 VITALS
SYSTOLIC BLOOD PRESSURE: 126 MMHG | DIASTOLIC BLOOD PRESSURE: 86 MMHG | OXYGEN SATURATION: 98 % | BODY MASS INDEX: 27.83 KG/M2 | HEIGHT: 64 IN | HEART RATE: 68 BPM | WEIGHT: 163 LBS

## 2019-08-30 DIAGNOSIS — R55 SYNCOPE, UNSPECIFIED SYNCOPE TYPE: Primary | ICD-10-CM

## 2019-08-30 PROCEDURE — 99213 OFFICE O/P EST LOW 20 MIN: CPT | Performed by: NURSE PRACTITIONER

## 2019-08-30 NOTE — PROGRESS NOTES
[facility] CARDIOLOGY  [address]  [facility] [address]  Phone: [phone]      Patient Name: Marylu Georges  :1960  Age: 59 y.o.  Primary Cardiologist: Danni Odell MD  Encounter Provider:  ISAIAS Marcial      Chief Complaint:   Chief Complaint   Patient presents with   • Follow-up     4 weeks          HPI  Marylu Georges is a 59 y.o. female who presents today for hospital reevaluation.     Pt has a  history significant for breast cancer, syncope, history of strokes..    Patient was hospitalized -2019 for syncopal event.  Cardiac enzymes were negative and ECG was unremarkable.  Echocardiogram was performed which was unremarkable as well as nuclear study.  Patient did have a ZIO monitor placed at time of discharge.  Patient also had MRI of the brain performed which revealed chronic lacunar infarcts in the jasbir that was not present in January of this year.  No stenosis on CT angiogram.  Patient was restarted on meclizine and has not had any vertigo for 2 days prior to discharge.    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  She states that she has had several head injuries in her career that cause vertigo.  She reports that during hospitalization they were unable to determine a cause for her syncopal event but she does note that her vertigo was causing problems.  She has been taking meclizine as needed and vertigo seems to be improving.  She states that she has had no further episodes of lightheadedness or dizziness.  She also denies chest pain, shortness of breath, palpitations, fatigue.    The following portions of the patient's history were reviewed and updated as appropriate: allergies, current medications, past family history, past medical history, past social history, past surgical history and problem list.      Review of Systems   Constitution: Negative for malaise/fatigue.   Cardiovascular: Negative for chest pain and leg  "swelling.   Respiratory: Negative for shortness of breath.    Neurological: Positive for dizziness. Negative for light-headedness.   All other systems reviewed and are negative.      OBJECTIVE:     Vitals:    08/30/19 1333   BP: 126/86   BP Location: Right arm   Patient Position: Sitting   Pulse: 68   SpO2: 98%   Weight: 73.9 kg (163 lb)   Height: 162.6 cm (64\")     Body mass index is 27.98 kg/m².    Physical Exam   Constitutional: She is oriented to person, place, and time. Vital signs are normal. She appears well-developed and well-nourished. She is active.   Eyes: Conjunctivae are normal.   Neck: Carotid bruit is not present.   Cardiovascular: Normal rate, regular rhythm and normal heart sounds.   Pulmonary/Chest: Breath sounds normal.   Abdominal: Normal appearance.   Musculoskeletal:   No cyanosis, clubbing, edema  Normal ROM   Neurological: She is alert and oriented to person, place, and time. GCS eye subscore is 4. GCS verbal subscore is 5. GCS motor subscore is 6.   Skin: Skin is warm, dry and intact.   Psychiatric: She has a normal mood and affect. Her speech is normal and behavior is normal. Judgment and thought content normal. Cognition and memory are normal.       Procedures    Cardiac Procedures:  1. Echocardiogram 7/31/2019.  EF 66%.  LAE.  Saline test results are negative.    2. Myocardial perfusion stress test 8/1/2019.  EF 61%.  Normal myocardial perfusion study without evidence of ischemia.  Impressions consistent with lower study.  3. ZIO monitor 8/23/2019.  5 or 6 brief runs of paroxysmal atrial tachycardia that lasted between 4-6 beats.  No clear atrial fibrillation.  No sustained bradycardia or tachyarrhythmias identified.        ASSESSMENT:      Diagnosis Plan   1. Syncope, unspecified syncope type           PLAN OF CARE:     1. Syncope.  Patient was hospitalized for an episode of syncope that is still has an unknown cause.  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  She states that she does suffer from vertigo which is now controlled with half a dose of meclizine.  Patient had echocardiogram, myocardial perfusion stress testing and ZIO monitor all of which were unremarkable as to determine a cause for syncope.  She has not had any further spells.  At this point I am not going to recommend any changes.  2. Follow-up with Dr. Odell in 2-3 months.  Please call the office sooner for any problems or complications.             Discharge Medications           Accurate as of 8/30/19  2:50 PM. If you have any questions, ask your nurse or doctor.               Continue These Medications      Instructions Start Date   aspirin 81 MG EC tablet   81 mg, Oral, Daily      atorvastatin 80 MG tablet  Commonly known as:  LIPITOR   80 mg, Oral, Nightly      cephalexin 500 MG capsule  Commonly known as:  KEFLEX   Pt to only take this medicine as needed      CO Q 10 PO   Oral      letrozole 2.5 MG tablet  Commonly known as:  FEMARA   2.5 mg, Oral, Daily      meclizine 25 MG tablet  Commonly known as:  ANTIVERT   25 mg, Oral, 2 Times Daily PRN      metoprolol succinate XL 25 MG 24 hr tablet  Commonly known as:  TOPROL XL   25 mg, Oral, Daily      MULTI VITAMIN DAILY PO   Oral      olopatadine 0.1 % ophthalmic solution  Commonly known as:  PATANOL   1 drop, Both Eyes, 2 Times Daily             Thank you for allowing me to participate in the care of your patient,         Cristina Magana, ISAIAS  [facility]  08/30/19  2:50 PM    **Adina Disclaimer:**  Much of this encounter note is an electronic transcription/translation of spoken language to printed text. The electronic translation of spoken language may permit erroneous, or at times, nonsensical words or phrases to be inadvertently transcribed. Although I have reviewed the note for such errors, some may still exist.

## 2019-09-16 ENCOUNTER — ANESTHESIA (OUTPATIENT)
Dept: PERIOP | Facility: HOSPITAL | Age: 59
End: 2019-09-16

## 2019-09-16 ENCOUNTER — HOSPITAL ENCOUNTER (OUTPATIENT)
Dept: NUCLEAR MEDICINE | Facility: HOSPITAL | Age: 59
Discharge: HOME | End: 2019-09-16

## 2019-09-16 ENCOUNTER — APPOINTMENT (OUTPATIENT)
Dept: NUCLEAR MEDICINE | Facility: HOSPITAL | Age: 59
End: 2019-09-16

## 2019-09-16 ENCOUNTER — HOSPITAL ENCOUNTER (OUTPATIENT)
Facility: HOSPITAL | Age: 59
Discharge: HOME | End: 2019-09-18
Attending: SURGERY | Admitting: SURGERY

## 2019-09-16 ENCOUNTER — ANESTHESIA EVENT (OUTPATIENT)
Dept: PERIOP | Facility: HOSPITAL | Age: 59
End: 2019-09-16

## 2019-09-16 DIAGNOSIS — Z17.0 MALIGNANT NEOPLASM OF OVERLAPPING SITES OF LEFT BREAST IN FEMALE, ESTROGEN RECEPTOR POSITIVE (HCC): ICD-10-CM

## 2019-09-16 DIAGNOSIS — C50.812 MALIGNANT NEOPLASM OF OVERLAPPING SITES OF LEFT BREAST IN FEMALE, ESTROGEN RECEPTOR POSITIVE (HCC): ICD-10-CM

## 2019-09-16 PROCEDURE — 25010000002 HYDROMORPHONE PER 4 MG: Performed by: NURSE ANESTHETIST, CERTIFIED REGISTERED

## 2019-09-16 PROCEDURE — 25010000002 FENTANYL CITRATE (PF) 100 MCG/2ML SOLUTION: Performed by: NURSE ANESTHETIST, CERTIFIED REGISTERED

## 2019-09-16 PROCEDURE — 19307 MAST MOD RAD: CPT | Performed by: SURGERY

## 2019-09-16 PROCEDURE — 25010000002 PROMETHAZINE PER 50 MG: Performed by: NURSE ANESTHETIST, CERTIFIED REGISTERED

## 2019-09-16 PROCEDURE — 38792 RA TRACER ID OF SENTINL NODE: CPT

## 2019-09-16 PROCEDURE — 25010000002 PROPOFOL 10 MG/ML EMULSION: Performed by: NURSE ANESTHETIST, CERTIFIED REGISTERED

## 2019-09-16 PROCEDURE — 25010000002 NEOSTIGMINE PER 0.5 MG: Performed by: NURSE ANESTHETIST, CERTIFIED REGISTERED

## 2019-09-16 PROCEDURE — 25010000002 ONDANSETRON PER 1 MG: Performed by: NURSE ANESTHETIST, CERTIFIED REGISTERED

## 2019-09-16 PROCEDURE — G0378 HOSPITAL OBSERVATION PER HR: HCPCS

## 2019-09-16 PROCEDURE — 88341 IMHCHEM/IMCYTCHM EA ADD ANTB: CPT | Performed by: SURGERY

## 2019-09-16 PROCEDURE — 25010000002 KETOROLAC TROMETHAMINE PER 15 MG: Performed by: ANESTHESIOLOGY

## 2019-09-16 PROCEDURE — A9541 TC99M SULFUR COLLOID: HCPCS | Performed by: SURGERY

## 2019-09-16 PROCEDURE — 0 TECHNETIUM FILTERED SULFUR COLLOID: Performed by: SURGERY

## 2019-09-16 PROCEDURE — 25010000002 DIPHENHYDRAMINE PER 50 MG: Performed by: NURSE ANESTHETIST, CERTIFIED REGISTERED

## 2019-09-16 PROCEDURE — 88342 IMHCHEM/IMCYTCHM 1ST ANTB: CPT | Performed by: SURGERY

## 2019-09-16 PROCEDURE — 88332 PATH CONSLTJ SURG EA ADD BLK: CPT | Performed by: SURGERY

## 2019-09-16 PROCEDURE — 88360 TUMOR IMMUNOHISTOCHEM/MANUAL: CPT | Performed by: SURGERY

## 2019-09-16 PROCEDURE — 88309 TISSUE EXAM BY PATHOLOGIST: CPT | Performed by: SURGERY

## 2019-09-16 PROCEDURE — 88307 TISSUE EXAM BY PATHOLOGIST: CPT | Performed by: SURGERY

## 2019-09-16 PROCEDURE — 25010000002 DEXAMETHASONE PER 1 MG: Performed by: NURSE ANESTHETIST, CERTIFIED REGISTERED

## 2019-09-16 PROCEDURE — 25010000002 PHENYLEPHRINE PER 1 ML: Performed by: NURSE ANESTHETIST, CERTIFIED REGISTERED

## 2019-09-16 PROCEDURE — 25010000003 CEFAZOLIN IN DEXTROSE 2-4 GM/100ML-% SOLUTION: Performed by: SURGERY

## 2019-09-16 PROCEDURE — 88331 PATH CONSLTJ SURG 1 BLK 1SPC: CPT | Performed by: SURGERY

## 2019-09-16 RX ORDER — PROMETHAZINE HYDROCHLORIDE 25 MG/ML
12.5 INJECTION, SOLUTION INTRAMUSCULAR; INTRAVENOUS ONCE AS NEEDED
Status: DISCONTINUED | OUTPATIENT
Start: 2019-09-16 | End: 2019-09-16 | Stop reason: HOSPADM

## 2019-09-16 RX ORDER — DIPHENHYDRAMINE HYDROCHLORIDE 50 MG/ML
12.5 INJECTION INTRAMUSCULAR; INTRAVENOUS
Status: DISCONTINUED | OUTPATIENT
Start: 2019-09-16 | End: 2019-09-16 | Stop reason: HOSPADM

## 2019-09-16 RX ORDER — GLYCOPYRROLATE 0.2 MG/ML
INJECTION INTRAMUSCULAR; INTRAVENOUS AS NEEDED
Status: DISCONTINUED | OUTPATIENT
Start: 2019-09-16 | End: 2019-09-16 | Stop reason: SURG

## 2019-09-16 RX ORDER — METOPROLOL SUCCINATE 25 MG/1
25 TABLET, EXTENDED RELEASE ORAL DAILY
Status: DISCONTINUED | OUTPATIENT
Start: 2019-09-17 | End: 2019-09-18 | Stop reason: HOSPADM

## 2019-09-16 RX ORDER — HYDRALAZINE HYDROCHLORIDE 20 MG/ML
5 INJECTION INTRAMUSCULAR; INTRAVENOUS
Status: DISCONTINUED | OUTPATIENT
Start: 2019-09-16 | End: 2019-09-16 | Stop reason: HOSPADM

## 2019-09-16 RX ORDER — EPHEDRINE SULFATE 50 MG/ML
5 INJECTION, SOLUTION INTRAVENOUS ONCE AS NEEDED
Status: DISCONTINUED | OUTPATIENT
Start: 2019-09-16 | End: 2019-09-16 | Stop reason: HOSPADM

## 2019-09-16 RX ORDER — ACETAMINOPHEN 500 MG
500 TABLET ORAL EVERY 6 HOURS PRN
COMMUNITY
End: 2020-04-20

## 2019-09-16 RX ORDER — FENTANYL CITRATE 50 UG/ML
50 INJECTION, SOLUTION INTRAMUSCULAR; INTRAVENOUS
Status: DISCONTINUED | OUTPATIENT
Start: 2019-09-16 | End: 2019-09-16 | Stop reason: HOSPADM

## 2019-09-16 RX ORDER — LIDOCAINE HYDROCHLORIDE 20 MG/ML
INJECTION, SOLUTION INFILTRATION; PERINEURAL AS NEEDED
Status: DISCONTINUED | OUTPATIENT
Start: 2019-09-16 | End: 2019-09-16 | Stop reason: SURG

## 2019-09-16 RX ORDER — NALOXONE HCL 0.4 MG/ML
0.2 VIAL (ML) INJECTION AS NEEDED
Status: DISCONTINUED | OUTPATIENT
Start: 2019-09-16 | End: 2019-09-16 | Stop reason: HOSPADM

## 2019-09-16 RX ORDER — DIPHENHYDRAMINE HCL 25 MG
25 CAPSULE ORAL
Status: DISCONTINUED | OUTPATIENT
Start: 2019-09-16 | End: 2019-09-16 | Stop reason: HOSPADM

## 2019-09-16 RX ORDER — MAGNESIUM HYDROXIDE 1200 MG/15ML
LIQUID ORAL AS NEEDED
Status: DISCONTINUED | OUTPATIENT
Start: 2019-09-16 | End: 2019-09-16 | Stop reason: HOSPADM

## 2019-09-16 RX ORDER — KETOROLAC TROMETHAMINE 30 MG/ML
INJECTION, SOLUTION INTRAMUSCULAR; INTRAVENOUS AS NEEDED
Status: DISCONTINUED | OUTPATIENT
Start: 2019-09-16 | End: 2019-09-16 | Stop reason: SURG

## 2019-09-16 RX ORDER — ONDANSETRON 2 MG/ML
4 INJECTION INTRAMUSCULAR; INTRAVENOUS EVERY 6 HOURS PRN
Status: DISCONTINUED | OUTPATIENT
Start: 2019-09-16 | End: 2019-09-18 | Stop reason: HOSPADM

## 2019-09-16 RX ORDER — PROMETHAZINE HYDROCHLORIDE 25 MG/1
25 TABLET ORAL ONCE AS NEEDED
Status: DISCONTINUED | OUTPATIENT
Start: 2019-09-16 | End: 2019-09-16 | Stop reason: HOSPADM

## 2019-09-16 RX ORDER — SODIUM CHLORIDE 0.9 % (FLUSH) 0.9 %
3 SYRINGE (ML) INJECTION EVERY 12 HOURS SCHEDULED
Status: DISCONTINUED | OUTPATIENT
Start: 2019-09-16 | End: 2019-09-16 | Stop reason: HOSPADM

## 2019-09-16 RX ORDER — ROCURONIUM BROMIDE 10 MG/ML
INJECTION, SOLUTION INTRAVENOUS AS NEEDED
Status: DISCONTINUED | OUTPATIENT
Start: 2019-09-16 | End: 2019-09-16 | Stop reason: SURG

## 2019-09-16 RX ORDER — FAMOTIDINE 10 MG/ML
20 INJECTION, SOLUTION INTRAVENOUS ONCE
Status: COMPLETED | OUTPATIENT
Start: 2019-09-16 | End: 2019-09-16

## 2019-09-16 RX ORDER — ONDANSETRON 2 MG/ML
INJECTION INTRAMUSCULAR; INTRAVENOUS AS NEEDED
Status: DISCONTINUED | OUTPATIENT
Start: 2019-09-16 | End: 2019-09-16 | Stop reason: SURG

## 2019-09-16 RX ORDER — EPHEDRINE SULFATE 50 MG/ML
INJECTION, SOLUTION INTRAVENOUS AS NEEDED
Status: DISCONTINUED | OUTPATIENT
Start: 2019-09-16 | End: 2019-09-16 | Stop reason: SURG

## 2019-09-16 RX ORDER — HYDROMORPHONE HYDROCHLORIDE 1 MG/ML
1 INJECTION, SOLUTION INTRAMUSCULAR; INTRAVENOUS; SUBCUTANEOUS
Status: DISCONTINUED | OUTPATIENT
Start: 2019-09-16 | End: 2019-09-18 | Stop reason: HOSPADM

## 2019-09-16 RX ORDER — FENTANYL CITRATE 50 UG/ML
INJECTION, SOLUTION INTRAMUSCULAR; INTRAVENOUS AS NEEDED
Status: DISCONTINUED | OUTPATIENT
Start: 2019-09-16 | End: 2019-09-16 | Stop reason: SURG

## 2019-09-16 RX ORDER — LABETALOL HYDROCHLORIDE 5 MG/ML
5 INJECTION, SOLUTION INTRAVENOUS
Status: DISCONTINUED | OUTPATIENT
Start: 2019-09-16 | End: 2019-09-16 | Stop reason: HOSPADM

## 2019-09-16 RX ORDER — FLUMAZENIL 0.1 MG/ML
0.2 INJECTION INTRAVENOUS AS NEEDED
Status: DISCONTINUED | OUTPATIENT
Start: 2019-09-16 | End: 2019-09-16 | Stop reason: HOSPADM

## 2019-09-16 RX ORDER — DEXAMETHASONE SODIUM PHOSPHATE 10 MG/ML
INJECTION INTRAMUSCULAR; INTRAVENOUS AS NEEDED
Status: DISCONTINUED | OUTPATIENT
Start: 2019-09-16 | End: 2019-09-16 | Stop reason: SURG

## 2019-09-16 RX ORDER — SODIUM CHLORIDE, SODIUM LACTATE, POTASSIUM CHLORIDE, CALCIUM CHLORIDE 600; 310; 30; 20 MG/100ML; MG/100ML; MG/100ML; MG/100ML
50 INJECTION, SOLUTION INTRAVENOUS CONTINUOUS
Status: DISCONTINUED | OUTPATIENT
Start: 2019-09-16 | End: 2019-09-17

## 2019-09-16 RX ORDER — CEFAZOLIN SODIUM 2 G/100ML
2 INJECTION, SOLUTION INTRAVENOUS ONCE
Status: COMPLETED | OUTPATIENT
Start: 2019-09-16 | End: 2019-09-16

## 2019-09-16 RX ORDER — MIDAZOLAM HYDROCHLORIDE 1 MG/ML
2 INJECTION INTRAMUSCULAR; INTRAVENOUS
Status: DISCONTINUED | OUTPATIENT
Start: 2019-09-16 | End: 2019-09-16 | Stop reason: HOSPADM

## 2019-09-16 RX ORDER — HYDROCODONE BITARTRATE AND ACETAMINOPHEN 7.5; 325 MG/1; MG/1
1 TABLET ORAL ONCE AS NEEDED
Status: DISCONTINUED | OUTPATIENT
Start: 2019-09-16 | End: 2019-09-16 | Stop reason: HOSPADM

## 2019-09-16 RX ORDER — PROMETHAZINE HYDROCHLORIDE 25 MG/1
25 SUPPOSITORY RECTAL ONCE AS NEEDED
Status: DISCONTINUED | OUTPATIENT
Start: 2019-09-16 | End: 2019-09-16 | Stop reason: HOSPADM

## 2019-09-16 RX ORDER — DIAZEPAM 5 MG/1
10 TABLET ORAL EVERY 6 HOURS PRN
Status: COMPLETED | OUTPATIENT
Start: 2019-09-16 | End: 2019-09-16

## 2019-09-16 RX ORDER — HYDROMORPHONE HYDROCHLORIDE 1 MG/ML
0.5 INJECTION, SOLUTION INTRAMUSCULAR; INTRAVENOUS; SUBCUTANEOUS
Status: DISCONTINUED | OUTPATIENT
Start: 2019-09-16 | End: 2019-09-16 | Stop reason: HOSPADM

## 2019-09-16 RX ORDER — LIDOCAINE HYDROCHLORIDE 10 MG/ML
0.5 INJECTION, SOLUTION EPIDURAL; INFILTRATION; INTRACAUDAL; PERINEURAL ONCE AS NEEDED
Status: DISCONTINUED | OUTPATIENT
Start: 2019-09-16 | End: 2019-09-16 | Stop reason: HOSPADM

## 2019-09-16 RX ORDER — LETROZOLE 2.5 MG/1
2.5 TABLET, FILM COATED ORAL DAILY
Status: DISCONTINUED | OUTPATIENT
Start: 2019-09-17 | End: 2019-09-18 | Stop reason: HOSPADM

## 2019-09-16 RX ORDER — MECLIZINE HYDROCHLORIDE 25 MG/1
25 TABLET ORAL 2 TIMES DAILY PRN
Status: DISCONTINUED | OUTPATIENT
Start: 2019-09-16 | End: 2019-09-18 | Stop reason: HOSPADM

## 2019-09-16 RX ORDER — PROMETHAZINE HYDROCHLORIDE 25 MG/ML
6.25 INJECTION, SOLUTION INTRAMUSCULAR; INTRAVENOUS
Status: DISCONTINUED | OUTPATIENT
Start: 2019-09-16 | End: 2019-09-16 | Stop reason: HOSPADM

## 2019-09-16 RX ORDER — SODIUM CHLORIDE 0.9 % (FLUSH) 0.9 %
3-10 SYRINGE (ML) INJECTION AS NEEDED
Status: DISCONTINUED | OUTPATIENT
Start: 2019-09-16 | End: 2019-09-16 | Stop reason: HOSPADM

## 2019-09-16 RX ORDER — HYDROMORPHONE HCL 110MG/55ML
PATIENT CONTROLLED ANALGESIA SYRINGE INTRAVENOUS AS NEEDED
Status: DISCONTINUED | OUTPATIENT
Start: 2019-09-16 | End: 2019-09-16 | Stop reason: SURG

## 2019-09-16 RX ORDER — ACETAMINOPHEN 325 MG/1
650 TABLET ORAL ONCE AS NEEDED
Status: DISCONTINUED | OUTPATIENT
Start: 2019-09-16 | End: 2019-09-16 | Stop reason: HOSPADM

## 2019-09-16 RX ORDER — ISOSULFAN BLUE 50 MG/5ML
INJECTION, SOLUTION SUBCUTANEOUS AS NEEDED
Status: DISCONTINUED | OUTPATIENT
Start: 2019-09-16 | End: 2019-09-16 | Stop reason: HOSPADM

## 2019-09-16 RX ORDER — SODIUM CHLORIDE, SODIUM LACTATE, POTASSIUM CHLORIDE, CALCIUM CHLORIDE 600; 310; 30; 20 MG/100ML; MG/100ML; MG/100ML; MG/100ML
9 INJECTION, SOLUTION INTRAVENOUS CONTINUOUS
Status: DISCONTINUED | OUTPATIENT
Start: 2019-09-16 | End: 2019-09-16

## 2019-09-16 RX ORDER — OXYCODONE AND ACETAMINOPHEN 7.5; 325 MG/1; MG/1
1 TABLET ORAL EVERY 4 HOURS PRN
Status: DISCONTINUED | OUTPATIENT
Start: 2019-09-16 | End: 2019-09-17

## 2019-09-16 RX ORDER — ONDANSETRON 2 MG/ML
4 INJECTION INTRAMUSCULAR; INTRAVENOUS ONCE AS NEEDED
Status: DISCONTINUED | OUTPATIENT
Start: 2019-09-16 | End: 2019-09-16 | Stop reason: HOSPADM

## 2019-09-16 RX ORDER — ONDANSETRON 4 MG/1
4 TABLET, FILM COATED ORAL EVERY 6 HOURS PRN
Status: DISCONTINUED | OUTPATIENT
Start: 2019-09-16 | End: 2019-09-18 | Stop reason: HOSPADM

## 2019-09-16 RX ORDER — LIDOCAINE AND PRILOCAINE 25; 25 MG/G; MG/G
CREAM TOPICAL ONCE
Status: COMPLETED | OUTPATIENT
Start: 2019-09-16 | End: 2019-09-16

## 2019-09-16 RX ORDER — PROPOFOL 10 MG/ML
VIAL (ML) INTRAVENOUS AS NEEDED
Status: DISCONTINUED | OUTPATIENT
Start: 2019-09-16 | End: 2019-09-16 | Stop reason: SURG

## 2019-09-16 RX ORDER — OXYCODONE AND ACETAMINOPHEN 7.5; 325 MG/1; MG/1
1 TABLET ORAL ONCE AS NEEDED
Status: DISCONTINUED | OUTPATIENT
Start: 2019-09-16 | End: 2019-09-16 | Stop reason: HOSPADM

## 2019-09-16 RX ORDER — NALOXONE HCL 0.4 MG/ML
0.1 VIAL (ML) INJECTION
Status: DISCONTINUED | OUTPATIENT
Start: 2019-09-16 | End: 2019-09-18 | Stop reason: HOSPADM

## 2019-09-16 RX ORDER — MIDAZOLAM HYDROCHLORIDE 1 MG/ML
1 INJECTION INTRAMUSCULAR; INTRAVENOUS
Status: DISCONTINUED | OUTPATIENT
Start: 2019-09-16 | End: 2019-09-16 | Stop reason: HOSPADM

## 2019-09-16 RX ADMIN — PROPOFOL 150 MG: 10 INJECTION, EMULSION INTRAVENOUS at 11:48

## 2019-09-16 RX ADMIN — FAMOTIDINE 20 MG: 10 INJECTION, SOLUTION INTRAVENOUS at 11:33

## 2019-09-16 RX ADMIN — SODIUM CHLORIDE, POTASSIUM CHLORIDE, SODIUM LACTATE AND CALCIUM CHLORIDE 9 ML/HR: 600; 310; 30; 20 INJECTION, SOLUTION INTRAVENOUS at 09:32

## 2019-09-16 RX ADMIN — HYDROMORPHONE HYDROCHLORIDE 0.5 MG: 2 INJECTION INTRAMUSCULAR; INTRAVENOUS; SUBCUTANEOUS at 15:21

## 2019-09-16 RX ADMIN — EPHEDRINE SULFATE 5 MG: 50 INJECTION INTRAMUSCULAR; INTRAVENOUS; SUBCUTANEOUS at 12:08

## 2019-09-16 RX ADMIN — PHENYLEPHRINE HYDROCHLORIDE 100 MCG: 10 INJECTION INTRAVENOUS at 12:18

## 2019-09-16 RX ADMIN — HYDROMORPHONE HYDROCHLORIDE 0.5 MG: 2 INJECTION INTRAMUSCULAR; INTRAVENOUS; SUBCUTANEOUS at 15:07

## 2019-09-16 RX ADMIN — FENTANYL CITRATE 50 MCG: 50 INJECTION, SOLUTION INTRAMUSCULAR; INTRAVENOUS at 13:56

## 2019-09-16 RX ADMIN — FENTANYL CITRATE 50 MCG: 50 INJECTION, SOLUTION INTRAMUSCULAR; INTRAVENOUS at 14:18

## 2019-09-16 RX ADMIN — TECHNETIUM TC 99M SULFUR COLLOID 1 DOSE: KIT at 11:53

## 2019-09-16 RX ADMIN — PROMETHAZINE HYDROCHLORIDE 6.25 MG: 25 INJECTION INTRAMUSCULAR; INTRAVENOUS at 16:38

## 2019-09-16 RX ADMIN — TECHNETIUM TC 99M SULFUR COLLOID 1 DOSE: KIT at 11:50

## 2019-09-16 RX ADMIN — ONDANSETRON 4 MG: 2 INJECTION INTRAMUSCULAR; INTRAVENOUS at 15:05

## 2019-09-16 RX ADMIN — FENTANYL CITRATE 50 MCG: 50 INJECTION, SOLUTION INTRAMUSCULAR; INTRAVENOUS at 13:21

## 2019-09-16 RX ADMIN — FENTANYL CITRATE 50 MCG: 50 INJECTION, SOLUTION INTRAMUSCULAR; INTRAVENOUS at 12:33

## 2019-09-16 RX ADMIN — HYDROMORPHONE HYDROCHLORIDE 0.5 MG: 2 INJECTION INTRAMUSCULAR; INTRAVENOUS; SUBCUTANEOUS at 15:32

## 2019-09-16 RX ADMIN — DIPHENHYDRAMINE HYDROCHLORIDE 12.5 MG: 50 INJECTION, SOLUTION INTRAMUSCULAR; INTRAVENOUS at 17:47

## 2019-09-16 RX ADMIN — HYDROMORPHONE HYDROCHLORIDE 0.5 MG: 2 INJECTION INTRAMUSCULAR; INTRAVENOUS; SUBCUTANEOUS at 15:16

## 2019-09-16 RX ADMIN — DIAZEPAM 10 MG: 5 TABLET ORAL at 09:14

## 2019-09-16 RX ADMIN — GLYCOPYRROLATE 0.5 MG: 0.2 INJECTION INTRAMUSCULAR; INTRAVENOUS at 15:25

## 2019-09-16 RX ADMIN — OXYCODONE HYDROCHLORIDE AND ACETAMINOPHEN 1 TABLET: 7.5; 325 TABLET ORAL at 22:58

## 2019-09-16 RX ADMIN — NEOSTIGMINE METHYLSULFATE 3 MG: 1 INJECTION INTRAMUSCULAR; INTRAVENOUS; SUBCUTANEOUS at 15:25

## 2019-09-16 RX ADMIN — EPHEDRINE SULFATE 5 MG: 50 INJECTION INTRAMUSCULAR; INTRAVENOUS; SUBCUTANEOUS at 12:09

## 2019-09-16 RX ADMIN — CEFAZOLIN SODIUM 2 G: 2 INJECTION, SOLUTION INTRAVENOUS at 12:03

## 2019-09-16 RX ADMIN — ROCURONIUM BROMIDE 40 MG: 10 INJECTION INTRAVENOUS at 11:48

## 2019-09-16 RX ADMIN — LIDOCAINE AND PRILOCAINE 1 APPLICATION: 25; 25 CREAM TOPICAL at 09:13

## 2019-09-16 RX ADMIN — KETOROLAC TROMETHAMINE 30 MG: 30 INJECTION, SOLUTION INTRAMUSCULAR; INTRAVENOUS at 15:37

## 2019-09-16 RX ADMIN — DEXAMETHASONE SODIUM PHOSPHATE 4 MG: 10 INJECTION INTRAMUSCULAR; INTRAVENOUS at 12:00

## 2019-09-16 RX ADMIN — SODIUM CHLORIDE, POTASSIUM CHLORIDE, SODIUM LACTATE AND CALCIUM CHLORIDE: 600; 310; 30; 20 INJECTION, SOLUTION INTRAVENOUS at 13:00

## 2019-09-16 RX ADMIN — SODIUM CHLORIDE, POTASSIUM CHLORIDE, SODIUM LACTATE AND CALCIUM CHLORIDE: 600; 310; 30; 20 INJECTION, SOLUTION INTRAVENOUS at 14:51

## 2019-09-16 RX ADMIN — FENTANYL CITRATE 50 MCG: 50 INJECTION, SOLUTION INTRAMUSCULAR; INTRAVENOUS at 11:48

## 2019-09-16 RX ADMIN — LIDOCAINE HYDROCHLORIDE 100 MG: 20 INJECTION, SOLUTION INFILTRATION; PERINEURAL at 11:48

## 2019-09-16 NOTE — OP NOTE
Surgeon: Joby Barron Jr., M.D.    Assistant: Joby Barron MD    An assistant was necessary for this procedure.    Pre-Operative Diagnosis:     Malignant neoplasm of overlapping sites of left breast in female, estrogen receptor positive (CMS/HCC) [C50.812, Z17.0]    Post-Operative Diagnosis:    Post-Op Diagnosis Codes:     * Malignant neoplasm of overlapping sites of left breast in female, estrogen receptor positive (CMS/HCC) [C50.812, Z17.0]    Procedure Performed:     Bilateral sentinel lymph node biopsy with bilateral modified radical mastectomies    Indications:     The patient is a pleasant 59-year-old female with advanced and metastatic left breast cancer and metastatic right breast cancer.  She is undergone chemotherapy with significant response.  She now presents for bilateral sentinel lymph node biopsies with bilateral mastectomies.  The patient understands the indications, alternatives, risks, and benefits of the procedure and wishes to proceed.    Procedure:     The patient was identified and taken to the operating room where she was placed in the supine position on the operating table.  She underwent general endotracheal anesthesia and was appropriate monitored throughout the case by the anesthesia personnel.  Her arms were extended on an armboard.  She had undergone injection of radiotracer in the radiology and had both lateral subareolar and dermal injection of blue dye, 5 cc on both sides.  Both breasts were massaged for 5 minutes.  Both chest wall shoulders, and axillae were prepped and draped in the standard surgical fashion.  Both mastectomies were performed in the same fashion.  An elliptical incision was made on the breast around the nipple and areole are complex and extending toward the axilla with a scalpel carried through the skin into the subcutaneous tissue.  On the left side the incision was more diagonal in orientation due to the locally advanced nature of the cancer and the need to  remove all residual scar at the skin level.  The axillary portion of both incisions were then fully open using Bovie electrocautery and the sentinel nodes were identified.  There was minimal radiotracer on either side blue dye on the left side.  Both sides had palpably abnormal nodes that were excised by dividing lymphatic vessels between hemoclips and Bovie electrocautery.  Both sides were passed off for pathology and both returned as positive.  Following this the mastectomy portion was performed.  After making the incision with a scalpel the breast was entered using both electrocautery.  Superior, medial, inferior, and lateral skin flaps were created using both electrocautery and the breast was removed from the chest wall lifting the pectoralis major fascia off the pectoralis major muscle and including it with the specimen.  A limited axillary dissection was performed on both sides by removing axillary contents from the chest wall and reflecting it laterally.  The axillary contents were then carefully swept down from the axillary vein dividing lymphatic vessels between hemoclips and Bovie electrocautery.  Care was taken to prevent injury to the long thoracic or thoracodorsal nerve.  Once completed, hemostasis was established using Bovie electrocautery.  2 separate MARBELLA drains were placed on each side through to exit wounds and one was placed on the chest wall and one in the axilla.  Both were secured at the skin level using 3-0 nylon sutures.  The subcutaneous tissue was reapproximated using 3-0 Vicryl suture in an interrupted fashion.  The skin was closed with a 4-0 Monocryl in a subcuticular fashion followed by Mastisol and Steri-Strips.  Sterile dressing was applied.  The sponge, needle, and instrument counts were correct at the end of the case.  The patient tolerated procedure well and was transferred to the recovery room in stable condition.    Estimated Blood Loss:      100 mL    Specimens:       Order Name  Source Comment Collection Info Order Time   TISSUE PATHOLOGY EXAM [place] Lymph Node  Collected By: Joby Barron Jr., MD 9/16/2019  1:01 PM       Joby Barron Jr., M.D.

## 2019-09-16 NOTE — ANESTHESIA PREPROCEDURE EVALUATION
Anesthesia Evaluation     Patient summary reviewed and Nursing notes reviewed   no history of anesthetic complications:  NPO Solid Status: > 8 hours  NPO Liquid Status: > 2 hours           Airway   Mallampati: II  TM distance: >3 FB  Neck ROM: full  Dental      Comment: Second front left tooth chip noted      Pulmonary - negative pulmonary ROS and normal exam   Cardiovascular - negative cardio ROS and normal exam  Exercise tolerance: good (4-7 METS)    Rhythm: regular        Neuro/Psych- negative ROS  GI/Hepatic/Renal/Endo - negative ROS     Musculoskeletal     Abdominal  - normal exam    Bowel sounds: normal.   Substance History - negative use     OB/GYN negative ob/gyn ROS         Other   (+) arthritis   history of cancer                    Anesthesia Plan    ASA 3     general     intravenous induction   Anesthetic plan, all risks, benefits, and alternatives have been provided, discussed and informed consent has been obtained with: patient.    Plan discussed with CRNA and attending.

## 2019-09-16 NOTE — ANESTHESIA PROCEDURE NOTES
Airway  Urgency: elective    Date/Time: 9/16/2019 11:51 AM  Airway not difficult    General Information and Staff    Patient location during procedure: OR  Anesthesiologist: Jude Hernandez MD  CRNA: Soni Erickson CRNA    Indications and Patient Condition  Indications for airway management: airway protection    Preoxygenated: yes  MILS maintained throughout  Mask difficulty assessment: 2 - vent by mask + OA or adjuvant +/- NMBA    Final Airway Details  Final airway type: endotracheal airway      Successful airway: ETT  Cuffed: yes   Successful intubation technique: direct laryngoscopy  Facilitating devices/methods: intubating stylet  Endotracheal tube insertion site: oral  Blade: Jonathon  Blade size: 3  ETT size (mm): 7.0  Cormack-Lehane Classification: grade I - full view of glottis  Placement verified by: chest auscultation and capnometry   Cuff volume (mL): 7  Measured from: lips  ETT/EBT  to lips (cm): 21  Number of attempts at approach: 1  Assessment: lips, teeth, and gum same as pre-op and atraumatic intubation    Additional Comments  Patient in OR. Monitors on. BLVS. Second front left tooth chip noted. Pre 02 100%. SIVI. DL x1. DVVC. Atraumatic placement of ETT. Placement verified with ETC02 and BBS. ETT secured. Teeth/lips in pre-op condition.

## 2019-09-16 NOTE — ANESTHESIA POSTPROCEDURE EVALUATION
"Patient: Marylu Georges    Procedure Summary     Date:  09/16/19 Room / Location:  [facility] OR 03 / [facility] MAIN OR    Anesthesia Start:  1144 Anesthesia Stop:  1607    Procedure:  BILATERLA MODIFIED RADICAL MASTECTOMY WITH BILATERAL SENTINEL LYMPH NODE BIOPSY (Bilateral Breast) Diagnosis:       Malignant neoplasm of overlapping sites of left breast in female, estrogen receptor positive (CMS/HCC)      (Malignant neoplasm of overlapping sites of left breast in female, estrogen receptor positive (CMS/HCC) [C50.812, Z17.0])    Surgeon:  Joby Barron Jr., MD Provider:  Jude Hernandez MD    Anesthesia Type:  general ASA Status:  3          Anesthesia Type: general  Last vitals  BP   124/74 (09/16/19 1830)   Temp   37.1 °C (98.7 °F) (09/16/19 1603)   Pulse   75 (09/16/19 1830)   Resp   16 (09/16/19 1830)     SpO2   96 % (09/16/19 1830)     Post Anesthesia Care and Evaluation    Patient location during evaluation: bedside  Patient participation: complete - patient participated  Level of consciousness: awake and alert  Pain management: adequate  Airway patency: patent  Anesthetic complications: No anesthetic complications  PONV Status: none  Cardiovascular status: acceptable  Respiratory status: acceptable  Hydration status: acceptable    Comments: /74   Pulse 75   Temp 37.1 °C (98.7 °F) (Oral)   Resp 16   Ht 167.6 cm (66\")   Wt 72.6 kg (160 lb)   SpO2 96%   BMI 25.82 kg/m²         "

## 2019-09-17 LAB
ANION GAP SERPL CALCULATED.3IONS-SCNC: 7.6 MMOL/L (ref 5–15)
BASOPHILS # BLD AUTO: 0.01 10*3/MM3 (ref 0–0.2)
BASOPHILS NFR BLD AUTO: 0.2 % (ref 0–1.5)
BUN BLD-MCNC: 11 MG/DL (ref 8–23)
BUN/CREAT SERPL: 15.3 (ref 7–25)
CALCIUM SPEC-SCNC: 8.5 MG/DL (ref 8.6–10.5)
CHLORIDE SERPL-SCNC: 103 MMOL/L (ref 98–107)
CO2 SERPL-SCNC: 28.4 MMOL/L (ref 22–29)
CREAT BLD-MCNC: 0.72 MG/DL (ref 0.57–1)
DEPRECATED RDW RBC AUTO: 51.6 FL (ref 37–54)
EOSINOPHIL # BLD AUTO: 0.02 10*3/MM3 (ref 0–0.4)
EOSINOPHIL NFR BLD AUTO: 0.4 % (ref 0.3–6.2)
ERYTHROCYTE [DISTWIDTH] IN BLOOD BY AUTOMATED COUNT: 14.4 % (ref 12.3–15.4)
GFR SERPL CREATININE-BSD FRML MDRD: 82 ML/MIN/1.73
GLUCOSE BLD-MCNC: 98 MG/DL (ref 65–99)
HCT VFR BLD AUTO: 32.4 % (ref 34–46.6)
HGB BLD-MCNC: 9.8 G/DL (ref 12–15.9)
IMM GRANULOCYTES # BLD AUTO: 0.02 10*3/MM3 (ref 0–0.05)
IMM GRANULOCYTES NFR BLD AUTO: 0.4 % (ref 0–0.5)
LYMPHOCYTES # BLD AUTO: 1.05 10*3/MM3 (ref 0.7–3.1)
LYMPHOCYTES NFR BLD AUTO: 21.3 % (ref 19.6–45.3)
MCH RBC QN AUTO: 29.5 PG (ref 26.6–33)
MCHC RBC AUTO-ENTMCNC: 30.2 G/DL (ref 31.5–35.7)
MCV RBC AUTO: 97.6 FL (ref 79–97)
MONOCYTES # BLD AUTO: 0.4 10*3/MM3 (ref 0.1–0.9)
MONOCYTES NFR BLD AUTO: 8.1 % (ref 5–12)
NEUTROPHILS # BLD AUTO: 3.43 10*3/MM3 (ref 1.7–7)
NEUTROPHILS NFR BLD AUTO: 69.6 % (ref 42.7–76)
NRBC BLD AUTO-RTO: 0 /100 WBC (ref 0–0.2)
PLATELET # BLD AUTO: 202 10*3/MM3 (ref 140–450)
PMV BLD AUTO: 9.1 FL (ref 6–12)
POTASSIUM BLD-SCNC: 4 MMOL/L (ref 3.5–5.2)
RBC # BLD AUTO: 3.32 10*6/MM3 (ref 3.77–5.28)
SODIUM BLD-SCNC: 139 MMOL/L (ref 136–145)
WBC NRBC COR # BLD: 4.93 10*3/MM3 (ref 3.4–10.8)

## 2019-09-17 PROCEDURE — 85025 COMPLETE CBC W/AUTO DIFF WBC: CPT | Performed by: SURGERY

## 2019-09-17 PROCEDURE — 80048 BASIC METABOLIC PNL TOTAL CA: CPT | Performed by: SURGERY

## 2019-09-17 PROCEDURE — G0378 HOSPITAL OBSERVATION PER HR: HCPCS

## 2019-09-17 PROCEDURE — 99024 POSTOP FOLLOW-UP VISIT: CPT | Performed by: SURGERY

## 2019-09-17 RX ORDER — OXYCODONE HYDROCHLORIDE AND ACETAMINOPHEN 5; 325 MG/1; MG/1
1 TABLET ORAL EVERY 4 HOURS PRN
Status: DISCONTINUED | OUTPATIENT
Start: 2019-09-17 | End: 2019-09-18 | Stop reason: HOSPADM

## 2019-09-17 RX ADMIN — OXYCODONE HYDROCHLORIDE AND ACETAMINOPHEN 1 TABLET: 7.5; 325 TABLET ORAL at 04:02

## 2019-09-17 RX ADMIN — OXYCODONE HYDROCHLORIDE AND ACETAMINOPHEN 1 TABLET: 5; 325 TABLET ORAL at 20:04

## 2019-09-17 RX ADMIN — SODIUM CHLORIDE, POTASSIUM CHLORIDE, SODIUM LACTATE AND CALCIUM CHLORIDE 50 ML/HR: 600; 310; 30; 20 INJECTION, SOLUTION INTRAVENOUS at 03:56

## 2019-09-17 RX ADMIN — LETROZOLE 2.5 MG: 2.5 TABLET ORAL at 08:27

## 2019-09-17 RX ADMIN — OXYCODONE HYDROCHLORIDE AND ACETAMINOPHEN 1 TABLET: 5; 325 TABLET ORAL at 12:04

## 2019-09-17 NOTE — PROGRESS NOTES
Chief Complaint:    S/P Bilateral sentinel lymph node biopsies with bilateral modified radical mastectomies, POD 1    Subjective:    The patient is feeling well with minimal postop pain.  She has been up and walking.  The MARBELLA drains have serosanguineous output.    Objective:    Temp:  [97.9 °F (36.6 °C)-99.8 °F (37.7 °C)] 99 °F (37.2 °C)  Heart Rate:  [53-86] 86  Resp:  [12-18] 16  BP: ()/() 96/52    Physical Exam   Constitutional: She is cooperative. She does not appear ill. No distress.   Pulmonary/Chest: Effort normal. No respiratory distress.   Dressings are in place and clean.  MARBELLA drains have serosanguineous output.   Neurological: She is alert.       Results:    Labs are pending.    Impression/Plan:    The patient is POD 1 from bilateral sentinel lymph node biopsies with bilateral modified radical mastectomies.  The patient is recovering well and will need to be taught MARBELLA drain care.  The plan is to discharge to home tomorrow, although she may be ready later today.    Joby Barron Jr., M.D.

## 2019-09-17 NOTE — PLAN OF CARE
Problem: Patient Care Overview  Goal: Plan of Care Review  Outcome: Ongoing (interventions implemented as appropriate)   09/17/19 4060   Coping/Psychosocial   Plan of Care Reviewed With patient   Plan of Care Review   Progress improving   OTHER   Outcome Summary Dsng dry/intact. Minimal pain. Voiding and ambulating. tolerating diet. MARBELLA x4.

## 2019-09-17 NOTE — PLAN OF CARE
Problem: Patient Care Overview  Goal: Plan of Care Review  Outcome: Ongoing (interventions implemented as appropriate)   09/17/19 0552   Coping/Psychosocial   Plan of Care Reviewed With patient   Plan of Care Review   Progress improving   OTHER   Outcome Summary Gauze dsgs to chest. MARBELLA x4 with small amts of serosanginous drainage out. Medicated with percocet for pain with good pain control. IS to 1250. F/C d/c'd this am and pt ambulated in halls.      Goal: Individualization and Mutuality  Outcome: Ongoing (interventions implemented as appropriate)    Goal: Discharge Needs Assessment  Outcome: Ongoing (interventions implemented as appropriate)    Goal: Interprofessional Rounds/Family Conf  Outcome: Ongoing (interventions implemented as appropriate)      Problem: Pain, Chronic (Adult)  Goal: Identify Related Risk Factors and Signs and Symptoms  Outcome: Ongoing (interventions implemented as appropriate)    Goal: Acceptable Pain/Comfort Level and Functional Ability  Outcome: Ongoing (interventions implemented as appropriate)      Problem: Fall Risk (Adult)  Goal: Identify Related Risk Factors and Signs and Symptoms  Outcome: Ongoing (interventions implemented as appropriate)    Goal: Absence of Fall  Outcome: Ongoing (interventions implemented as appropriate)

## 2019-09-18 VITALS
RESPIRATION RATE: 16 BRPM | OXYGEN SATURATION: 95 % | HEIGHT: 66 IN | DIASTOLIC BLOOD PRESSURE: 66 MMHG | WEIGHT: 160 LBS | BODY MASS INDEX: 25.71 KG/M2 | TEMPERATURE: 98.1 F | HEART RATE: 66 BPM | SYSTOLIC BLOOD PRESSURE: 103 MMHG

## 2019-09-18 PROCEDURE — G0378 HOSPITAL OBSERVATION PER HR: HCPCS

## 2019-09-18 RX ORDER — OXYCODONE HYDROCHLORIDE AND ACETAMINOPHEN 5; 325 MG/1; MG/1
1 TABLET ORAL EVERY 6 HOURS PRN
Qty: 30 TABLET | Refills: 0 | Status: ON HOLD | OUTPATIENT
Start: 2019-09-18 | End: 2019-10-30 | Stop reason: SDUPTHER

## 2019-09-18 RX ORDER — ONDANSETRON 4 MG/1
4 TABLET, FILM COATED ORAL EVERY 6 HOURS PRN
Qty: 20 TABLET | Refills: 0 | Status: SHIPPED | OUTPATIENT
Start: 2019-09-18 | End: 2020-02-18

## 2019-09-18 RX ADMIN — METOPROLOL SUCCINATE 25 MG: 25 TABLET, FILM COATED, EXTENDED RELEASE ORAL at 08:17

## 2019-09-18 RX ADMIN — LETROZOLE 2.5 MG: 2.5 TABLET ORAL at 08:17

## 2019-09-18 RX ADMIN — OXYCODONE HYDROCHLORIDE AND ACETAMINOPHEN 1 TABLET: 5; 325 TABLET ORAL at 05:29

## 2019-09-18 NOTE — DISCHARGE SUMMARY
Discharge Summary    Date of admission: 9/16/2019    Date of discharge: 9/18/2019    Final diagnosis:    1.  Bilateral breast cancer with lymph node metastases    Procedures performed during admission:    1.  Bilateral sentinel lymph node biopsy with bilateral modified radical mastectomy on 9/16/2019    Consulting physicians:    1.  None    Reason for admission:    The patient is a pleasant 59-year-old female who was diagnosed with bilateral breast cancer including a locally advanced left breast cancer.  She underwent neoadjuvant chemotherapy with significant response.  She now presents for bilateral mastectomies with sentinel lymph node biopsies.    Hospital course:    The patient was admitted on 9/16/2019 and underwent bilateral sentinel lymph node biopsies, which were positive, followed by bilateral modified radical mastectomies.  She left the operating room with 4 drains and was admitted and transferred to the floor.  She did very well postoperatively with minimal pain.  The drain output was serosanguineous in nature.  She had one drain become dislodged and fall out but the 3 remaining are doing well.  The incisions are healing well.  She is felt ready for discharge to home.    Prescriptions:    She was given a prescription for Percocet and Zofran.    Follow-up:    She will follow-up with me in 1 week.    Joby Barron Jr., M.D.

## 2019-09-18 NOTE — PROGRESS NOTES
Discharge Planning Assessment  [facility]     Patient Name: Marylu Georges  MRN: [id number]  Today's Date: 9/18/2019    Admit Date: 9/16/2019      Discharge Plan     Row Name 09/18/19 1303       Plan    Plan Comments  Discharge order. No discharge needs identified.    Final Discharge Disposition Code  01 - home or self-care     Expected Discharge Date and Time     Expected Discharge Date Expected Discharge Time    Sep 18, 2019      Coeltte Clifford RN

## 2019-09-18 NOTE — PLAN OF CARE
Problem: Patient Care Overview  Goal: Plan of Care Review  Outcome: Ongoing (interventions implemented as appropriate)   09/18/19 0424   Coping/Psychosocial   Plan of Care Reviewed With patient   Plan of Care Review   Progress improving   OTHER   Outcome Summary vss. chest dressing CDI. JPx4 with small output. MARBELLA #2 won't stay cmpressed. pain pill given. voiding freely.      Goal: Individualization and Mutuality  Outcome: Ongoing (interventions implemented as appropriate)    Goal: Discharge Needs Assessment  Outcome: Ongoing (interventions implemented as appropriate)    Goal: Interprofessional Rounds/Family Conf  Outcome: Ongoing (interventions implemented as appropriate)      Problem: Pain, Chronic (Adult)  Goal: Identify Related Risk Factors and Signs and Symptoms  Outcome: Outcome(s) achieved Date Met: 09/18/19    Goal: Acceptable Pain/Comfort Level and Functional Ability  Outcome: Ongoing (interventions implemented as appropriate)      Problem: Breast Surgery/Reconstruction (Adult)  Goal: Signs and Symptoms of Listed Potential Problems Will be Absent, Minimized or Managed (Breast Surgery/Reconstruction)  Outcome: Ongoing (interventions implemented as appropriate)    Goal: Anesthesia/Sedation Recovery  Outcome: Ongoing (interventions implemented as appropriate)

## 2019-09-20 ENCOUNTER — TELEPHONE (OUTPATIENT)
Dept: SURGERY | Facility: CLINIC | Age: 59
End: 2019-09-20

## 2019-09-20 NOTE — TELEPHONE ENCOUNTER
Pt called wanting you to know that her other drain on same side is hanging on by just a thread. Says it still has suction but wasn't sure if this was ok.

## 2019-09-24 ENCOUNTER — OFFICE VISIT (OUTPATIENT)
Dept: SURGERY | Facility: CLINIC | Age: 59
End: 2019-09-24

## 2019-09-24 VITALS — WEIGHT: 156.8 LBS | BODY MASS INDEX: 25.31 KG/M2 | OXYGEN SATURATION: 99 % | HEART RATE: 60 BPM

## 2019-09-24 DIAGNOSIS — Z48.89 POSTOPERATIVE VISIT: Primary | ICD-10-CM

## 2019-09-24 PROCEDURE — 99024 POSTOP FOLLOW-UP VISIT: CPT | Performed by: SURGERY

## 2019-09-24 NOTE — PROGRESS NOTES
Subjective   Marylu Georges is a 59 y.o. female who returns to the office after undergoing a bilateral sentinel lymph node biopsy and bilateral modified radical mastectomy on 9/16/2019.     History of Present Illness     The patient is recovering well with no significant postop symptoms.  She is having no pain.  She has a good appetite and normal bowel function.  Her energy level is good.  Her MARBELLA drain output is minimal.    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    Review of Systems   Constitutional: Negative for activity change, appetite change, fatigue and fever.   Respiratory: Negative for chest tightness and shortness of breath.    Cardiovascular: Negative for chest pain and palpitations.   Gastrointestinal: Negative for nausea and vomiting.   Skin: Negative for rash and wound.   Psychiatric/Behavioral: Negative for agitation and confusion.       Objective   Physical Exam   Constitutional: She is cooperative.  Non-toxic appearance. She does not appear ill. No distress.   Pulmonary/Chest: Effort normal. No respiratory distress.   Neurological: She is alert.   Skin:   Incision: intact with no evidence of infection.  MARBELLA drains: Minimal serous output.   Psychiatric: She has a normal mood and affect. Her behavior is normal.       Assessment/Plan   The encounter diagnosis was Postoperative visit.    The patient is recovering well from her bilateral mastectomies with bilateral axillary dissections.  Her MARBELLA drains were all removed.  She was advised on simple exercises to reestablish range of motion of her shoulders.  She will follow-up in 1 week.

## 2019-10-01 ENCOUNTER — OFFICE VISIT (OUTPATIENT)
Dept: SURGERY | Facility: CLINIC | Age: 59
End: 2019-10-01

## 2019-10-01 VITALS — HEART RATE: 59 BPM | WEIGHT: 154.8 LBS | BODY MASS INDEX: 24.99 KG/M2 | OXYGEN SATURATION: 99 %

## 2019-10-01 DIAGNOSIS — Z48.89 POSTOPERATIVE VISIT: Primary | ICD-10-CM

## 2019-10-01 PROCEDURE — 99024 POSTOP FOLLOW-UP VISIT: CPT | Performed by: SURGERY

## 2019-10-01 NOTE — PROGRESS NOTES
Subjective   Marylu Georges is a 59 y.o. female who returns to the office after undergoing a bilateral sentinel lymph node biopsy with bilateral modified radical mastectomies on 9/16/2019.     History of Present Illness     The patient is recovering well with no significant postop symptoms.  She has no pain.  She has had no problems from the incisions.  Her energy level is good.    Review of Systems   Constitutional: Negative for activity change, appetite change, fatigue and fever.   Respiratory: Negative for chest tightness and shortness of breath.    Cardiovascular: Negative for chest pain and palpitations.   Gastrointestinal: Negative for abdominal pain, constipation, diarrhea and nausea.   Skin: Negative for rash and wound.   Psychiatric/Behavioral: Negative for agitation and confusion.       Objective   Physical Exam   Constitutional: She is cooperative.  Non-toxic appearance. She does not appear ill. No distress.   Pulmonary/Chest: Effort normal. No respiratory distress.   Neurological: She is alert.   Skin:   Incision: intact with no evidence of infection.   Psychiatric: She has a normal mood and affect. Her behavior is normal.       Assessment/Plan   The encounter diagnosis was Postoperative visit.    The patient is recovering well from her bilateral sentinel lymph node biopsies and bilateral mastectomies.  At this point she can increase her activity.  She will follow-up on an as-needed basis.

## 2019-10-10 LAB
LAB AP CASE REPORT: NORMAL
LAB AP CLINICAL INFORMATION: NORMAL
LAB AP DIAGNOSIS COMMENT: NORMAL
LAB AP INTRADEPARTMENTAL CONSULT: NORMAL
LAB AP SPECIAL STAINS: NORMAL
LAB AP SYNOPTIC CHECKLIST: NORMAL
Lab: NORMAL
PATH REPORT.ADDENDUM SPEC: NORMAL
PATH REPORT.FINAL DX SPEC: NORMAL
PATH REPORT.GROSS SPEC: NORMAL

## 2019-10-15 ENCOUNTER — INFUSION (OUTPATIENT)
Dept: ONCOLOGY | Facility: HOSPITAL | Age: 59
End: 2019-10-15

## 2019-10-15 ENCOUNTER — OFFICE VISIT (OUTPATIENT)
Dept: ONCOLOGY | Facility: CLINIC | Age: 59
End: 2019-10-15

## 2019-10-15 VITALS
HEIGHT: 64 IN | TEMPERATURE: 98.7 F | RESPIRATION RATE: 14 BRPM | BODY MASS INDEX: 27.62 KG/M2 | SYSTOLIC BLOOD PRESSURE: 164 MMHG | HEART RATE: 65 BPM | OXYGEN SATURATION: 97 % | WEIGHT: 161.8 LBS | DIASTOLIC BLOOD PRESSURE: 95 MMHG

## 2019-10-15 DIAGNOSIS — C50.812 MALIGNANT NEOPLASM OF OVERLAPPING SITES OF LEFT BREAST IN FEMALE, ESTROGEN RECEPTOR POSITIVE (HCC): ICD-10-CM

## 2019-10-15 DIAGNOSIS — Z17.0 MALIGNANT NEOPLASM OF OVERLAPPING SITES OF LEFT BREAST IN FEMALE, ESTROGEN RECEPTOR POSITIVE (HCC): ICD-10-CM

## 2019-10-15 DIAGNOSIS — Z17.0 MALIGNANT NEOPLASM OF OVERLAPPING SITES OF LEFT BREAST IN FEMALE, ESTROGEN RECEPTOR POSITIVE (HCC): Primary | ICD-10-CM

## 2019-10-15 DIAGNOSIS — R22.31 AXILLARY MASS, RIGHT: ICD-10-CM

## 2019-10-15 DIAGNOSIS — D63.0 ANEMIA IN NEOPLASTIC DISEASE: ICD-10-CM

## 2019-10-15 DIAGNOSIS — I87.8 POOR VENOUS ACCESS: ICD-10-CM

## 2019-10-15 DIAGNOSIS — Z45.2 FITTING AND ADJUSTMENT OF VASCULAR CATHETER: Primary | ICD-10-CM

## 2019-10-15 DIAGNOSIS — C50.812 MALIGNANT NEOPLASM OF OVERLAPPING SITES OF LEFT BREAST IN FEMALE, ESTROGEN RECEPTOR POSITIVE (HCC): Primary | ICD-10-CM

## 2019-10-15 DIAGNOSIS — M25.551 PAIN OF RIGHT HIP JOINT: ICD-10-CM

## 2019-10-15 LAB
ALBUMIN SERPL-MCNC: 4.5 G/DL (ref 3.5–5.2)
ALBUMIN/GLOB SERPL: 2 G/DL (ref 1.1–2.4)
ALP SERPL-CCNC: 81 U/L (ref 38–116)
ALT SERPL W P-5'-P-CCNC: 21 U/L (ref 0–33)
ANION GAP SERPL CALCULATED.3IONS-SCNC: 12.6 MMOL/L (ref 5–15)
AST SERPL-CCNC: 26 U/L (ref 0–32)
BASOPHILS # BLD AUTO: 0.03 10*3/MM3 (ref 0–0.2)
BASOPHILS NFR BLD AUTO: 0.6 % (ref 0–1.5)
BILIRUB SERPL-MCNC: 0.3 MG/DL (ref 0.2–1.2)
BUN BLD-MCNC: 17 MG/DL (ref 6–20)
BUN/CREAT SERPL: 24.3 (ref 7.3–30)
CALCIUM SPEC-SCNC: 9.3 MG/DL (ref 8.5–10.2)
CHLORIDE SERPL-SCNC: 103 MMOL/L (ref 98–107)
CO2 SERPL-SCNC: 24.4 MMOL/L (ref 22–29)
CREAT BLD-MCNC: 0.7 MG/DL (ref 0.6–1.1)
DEPRECATED RDW RBC AUTO: 49.3 FL (ref 37–54)
EOSINOPHIL # BLD AUTO: 0.13 10*3/MM3 (ref 0–0.4)
EOSINOPHIL NFR BLD AUTO: 2.6 % (ref 0.3–6.2)
ERYTHROCYTE [DISTWIDTH] IN BLOOD BY AUTOMATED COUNT: 13.8 % (ref 12.3–15.4)
GFR SERPL CREATININE-BSD FRML MDRD: 85 ML/MIN/1.73
GLOBULIN UR ELPH-MCNC: 2.2 GM/DL (ref 1.8–3.5)
GLUCOSE BLD-MCNC: 104 MG/DL (ref 74–124)
HCT VFR BLD AUTO: 35.5 % (ref 34–46.6)
HGB BLD-MCNC: 11 G/DL (ref 12–15.9)
IMM GRANULOCYTES # BLD AUTO: 0.01 10*3/MM3 (ref 0–0.05)
IMM GRANULOCYTES NFR BLD AUTO: 0.2 % (ref 0–0.5)
LYMPHOCYTES # BLD AUTO: 1.14 10*3/MM3 (ref 0.7–3.1)
LYMPHOCYTES NFR BLD AUTO: 22.8 % (ref 19.6–45.3)
MCH RBC QN AUTO: 29.9 PG (ref 26.6–33)
MCHC RBC AUTO-ENTMCNC: 31 G/DL (ref 31.5–35.7)
MCV RBC AUTO: 96.5 FL (ref 79–97)
MONOCYTES # BLD AUTO: 0.38 10*3/MM3 (ref 0.1–0.9)
MONOCYTES NFR BLD AUTO: 7.6 % (ref 5–12)
NEUTROPHILS # BLD AUTO: 3.3 10*3/MM3 (ref 1.7–7)
NEUTROPHILS NFR BLD AUTO: 66.2 % (ref 42.7–76)
NRBC BLD AUTO-RTO: 0 /100 WBC (ref 0–0.2)
PLATELET # BLD AUTO: 206 10*3/MM3 (ref 140–450)
PMV BLD AUTO: 8.4 FL (ref 6–12)
POTASSIUM BLD-SCNC: 4.3 MMOL/L (ref 3.5–4.7)
PROT SERPL-MCNC: 6.7 G/DL (ref 6.3–8)
RBC # BLD AUTO: 3.68 10*6/MM3 (ref 3.77–5.28)
SODIUM BLD-SCNC: 140 MMOL/L (ref 134–145)
WBC NRBC COR # BLD: 4.99 10*3/MM3 (ref 3.4–10.8)

## 2019-10-15 PROCEDURE — 80053 COMPREHEN METABOLIC PANEL: CPT | Performed by: INTERNAL MEDICINE

## 2019-10-15 PROCEDURE — 85025 COMPLETE CBC W/AUTO DIFF WBC: CPT | Performed by: INTERNAL MEDICINE

## 2019-10-15 PROCEDURE — 99215 OFFICE O/P EST HI 40 MIN: CPT | Performed by: INTERNAL MEDICINE

## 2019-10-15 PROCEDURE — 96523 IRRIG DRUG DELIVERY DEVICE: CPT | Performed by: INTERNAL MEDICINE

## 2019-10-15 RX ORDER — SODIUM CHLORIDE 0.9 % (FLUSH) 0.9 %
10 SYRINGE (ML) INJECTION AS NEEDED
Status: CANCELLED | OUTPATIENT
Start: 2019-10-15

## 2019-10-15 RX ORDER — SODIUM CHLORIDE 0.9 % (FLUSH) 0.9 %
10 SYRINGE (ML) INJECTION AS NEEDED
Status: DISCONTINUED | OUTPATIENT
Start: 2019-10-15 | End: 2019-10-15 | Stop reason: HOSPADM

## 2019-10-15 RX ADMIN — Medication 500 UNITS: at 10:43

## 2019-10-15 RX ADMIN — SODIUM CHLORIDE, PRESERVATIVE FREE 10 ML: 5 INJECTION INTRAVENOUS at 10:43

## 2019-10-15 NOTE — PROGRESS NOTES
Subjective     REASON FOR FOLLOW UP:  1. 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 R BREAST MASS WITH GIANT R AXILLARY ADENOPATHY.  3. FAMILY HISTORY OF BREAST CANCER IN MOTHER AND SISTER, SISTER WAS TESTED FOR BRCA AND WAS NEGATIVE.                                       History of Present Illness   This patient returns today to the office after she has undergone bilateral mastectomy and axillary discretion in the background of her breast cancer on the left locally advanced and neglected for many years. The patient underwent neoadjuvant treatment with AC and subsequently Taxol and she had a very dramatic clinical and radiological improvement. The patient has undergone surgery with no difficulties whatsoever, minimal problems including minimal pain and now she is back to baseline. Surgical sites have healed. The tubes, the drain, the surgical site were removed a week after completion of her surgery. She did not have any pain or infections. No clinical bleeding. Otherwise she feels perfectly fine. She has a good appetite, normal bowel activity, normal urination. No neurological symptomatology, no bone pain besides the chronic pain in the right hip.                                     Past Medical History:   Diagnosis Date   • Anemia in neoplastic disease    • Arthritis    • Breast cancer (CMS/HCC)     Left   • CVA (cerebral vascular accident) (CMS/HCC)    • Hip pain     RIGHT HIP... CYST   • History of fracture of leg 1987   • Skin sore     OPEN SORE LEFT BREAST   • Syncope    • Vertigo            Past Surgical History:   Procedure Laterality Date   • FRACTURE SURGERY  1987    Leg   • HARDWARE REMOVAL     • MASTECTOMY W/ SENTINEL NODE BIOPSY Bilateral 9/16/2019    Procedure: BILATERLA MODIFIED RADICAL MASTECTOMY WITH BILATERAL SENTINEL LYMPH NODE BIOPSY;  Surgeon: Joby Barron Jr., MD;  Location: [facility];  Service: General   • VENOUS ACCESS DEVICE (PORT) INSERTION Right 2/1/2019     Procedure: INSERTION VENOUS ACCESS DEVICE;  Surgeon: Joby Barron Jr., MD;  [place] [facility] [place] [facility] [place]: General        Current Outpatient Medications on File Prior to Visit   Medication Sig Dispense Refill   • acetaminophen (TYLENOL) 500 MG tablet Take 500 mg by mouth Every 6 (Six) Hours As Needed for Mild Pain .     • aspirin 81 MG EC tablet Take 1 tablet by mouth Daily. 30 tablet 0   • cephalexin (KEFLEX) 500 MG capsule Pt to only take this medicine as needed  0   • DICLOFENAC PO Take  by mouth.     • IBUPROFEN PO Take  by mouth.     • letrozole (FEMARA) 2.5 MG tablet Take 1 tablet by mouth Daily. 30 tablet 6   • meclizine (ANTIVERT) 25 MG tablet Take 1 tablet by mouth 2 (Two) Times a Day As Needed for dizziness. 28 tablet 0   • metoprolol succinate XL (TOPROL XL) 25 MG 24 hr tablet Take 1 tablet by mouth Daily. 30 tablet 6   • Multiple Vitamin (MULTI VITAMIN DAILY PO) Take  by mouth.     • olopatadine (PATANOL) 0.1 % ophthalmic solution Administer 1 drop to both eyes 2 (Two) Times a Day. 5 mL 5   • ondansetron (ZOFRAN) 4 MG tablet Take 1 tablet by mouth Every 6 (Six) Hours As Needed for Nausea or Vomiting for up to 20 doses. 20 tablet 0   • oxyCODONE-acetaminophen (PERCOCET) 5-325 MG per tablet Take 1 tablet by mouth Every 6 (Six) Hours As Needed for pain 30 tablet 0     Current Facility-Administered Medications on File Prior to Visit   Medication Dose Route Frequency Provider Last Rate Last Dose   • [DISCONTINUED] heparin flush (porcine) 100 UNIT/ML injection 500 Units  500 Units Intravenous PRN Elie Dixon MD   500 Units at 10/15/19 1043   • [DISCONTINUED] sodium chloride 0.9 % flush 10 mL  10 mL Intravenous PRN Elie Dixon MD   10 mL at 10/15/19 1043        ALLERGIES:    Allergies   Allergen Reactions   • Erythromycin GI Intolerance   • Levaquin [Levofloxacin] GI Intolerance   • Penicillins GI Intolerance        Social History     Socioeconomic History   • Marital status:       Spouse name: Cruz   • Number of children: 0   • Years of education: Not on file   • Highest education level: Not on file   Occupational History   • Occupation:      Employer: SELF-EMPLOYED   Social Needs   • Financial resource strain: Not hard at all   • Food insecurity:     Worry: Never true     Inability: Never true   • Transportation needs:     Medical: No     Non-medical: No   Tobacco Use   • Smoking status: Never Smoker   • Smokeless tobacco: Never Used   Substance and Sexual Activity   • Alcohol use: Yes     Alcohol/week: 4.2 oz     Types: 4 Glasses of wine, 3 Cans of beer per week     Frequency: 2-3 times a week     Drinks per session: 1 or 2     Binge frequency: Never     Comment: OCCASIONALLY   • Drug use: No   • Sexual activity: Not Currently     Partners: Male     Birth control/protection: Post-menopausal   Lifestyle   • Physical activity:     Days per week: 7 days     Minutes per session: 120 min   • Stress: Only a little        Family History   Problem Relation Age of Onset   • Lung cancer Father    • Cancer Mother    • Breast cancer Mother    • Liver disease Mother    • Breast cancer Sister 48   • Breast cancer Maternal Grandmother    • Breast cancer Paternal Grandmother    • Breast cancer Maternal Uncle    • Malig Hyperthermia Neg Hx         Review of Systems       General: no fever, no chills, no fatigue,no weight changes, no lack of appetite.  Eyes: no epiphora, xerophthalmia,conjunctivitis, pain, glaucoma, blurred vision, blindness, secretion, photophobia, proptosis, diplopia.  Ears: no otorrhea, tinnitus, otorrhagia, deafness, pain, vertigo.  Nose: no rhinorrhea, no epistaxis, no alteration in perception of odors, no sinuses pressure.  Mouth: no alteration in gums or teeth,  No ulcers, no difficulty with mastication or deglut ion, no odynophagia.  Neck: no masses or pain, no thyroid alterations, no pain in muscles or arteries, no carotid odynia, no crepitation.  Respiratory: no  "cough, no sputum production,no dyspnea,no trepopnea, no pleuritic pain,no hemoptysis.  Heart: no syncope, no irregularity, no palpitations, no angina,no orthopnea,no paroxysmal nocturnal dyspnea.  Vascular Venous: no tenderness,no edema,no palpable cords,no postphlebitic syndrome, no skin changes no ulcerations.  Vascular Arterial: no distal ischemia, noclaudication, no gangrene, no neuropathic ischemic pain, no skin ulcers, no paleness no cyanosis.  GI: no dysphagia, no odynophagia, no regurgitation, no heartburn,no indigestion,no nausea,no vomiting,no hematemesis ,no melena,no jaundice,no distention, no obstipation,no enterorrhagia,no proctalgia,no anal  lesions, no changes in bowel habits.  : no frequency, no hesitancy, no hematuria, no discharge,no  pain.  Musculoskeletal: no muscle or tendon pain or inflammation,no  joint pain, no edema, no functional limitation,no fasciculations, no mass.  Neurologic: no headache, no seizures, noalterations on Craneal nerves, no motor deficit, no sensory deficit, normal coordination, no alteration in memory,normal orientation, calculation,normal writting, verbal and written language.  Skin: no rashes,no pruritus no localized lesions.  Psychiatric: no anxiety, no depression,no agitation, no delusions, proper insight.                  Objective     Vitals:    10/15/19 1059   BP: 164/95   Pulse: 65   Resp: 14   Temp: 98.7 °F (37.1 °C)   TempSrc: Oral   SpO2: 97%   Weight: 73.4 kg (161 lb 12.8 oz)   Height: 163.5 cm (64.37\")   PainSc: 6  Comment: RT hip pain     Current Status 10/15/2019   ECOG score 0       Physical Exam   GENERAL:  Well-developed, well-nourished  Patient  in no acute distress.   SKIN:  Warm, dry ,NO rashes,NO purpura ,NO petechiae.  HEENT:  Pupils were equal and reactive to light and accomodation, conjunctivas non injected, no pterigion, normal extraocular movements, normal visual acuity.   Mouth mucosa was moist, no exudates in oropharynx, normal gum line, " normal roof of the mouth and pillars, normal papillations of the tongue.  NECK:  Supple with good range of motion; no thyromegaly or masses, no JVD or bruits, no cervical adenopathies.No carotid arteries pain, no carotid abnormal pulsation , NO arterial dance.  LYMPHATICS:  No cervical, NO supraclavicular, NO axillary,NO epitrochlear , NO inguinal adenopathy.  CHEST:  Normal excursion of both luz thoraces, normal voice fremitus, no subcutaneous emphysema, normal axillas, no rashes or acanthosis nigricans. Lungs clear to percussion and auscultation, normal breath sounds bilaterally, no wheezing,NO crackles NO ronchi, NO stridor, NO rubs.  CARDIAC AND VASCULAR:  normal rate and regular rhythm, without murmurs,NO rubs NO S3 NO S4 right or left . Normal femoral, popliteal, pedis, brachial and carotid pulses.  Bilateral mastectomy sites are perfectly well healed with no masses, induration or tenderness, bilateral areas of axillary dissection are perfectly healed with no masses or induration. No lymphedema in either extremity.     ABDOMEN:  Soft, nontender with no organomegaly or masses, no ascites, no collateral circulation,no distention,no [place] sign, no abdominal pain, no inguinal hernias,no umbilical hernia, no abdominal bruits. Normal bowel sounds.  GENITAL: Not  Performed.  EXTREMITIES  AND SPINE:  No clubbing, cyanosis or edema, no deformities or pain .No kyphosis, scoliosis, deformities or pain in spine, ribs or pelvic bone.  NEUROLOGICAL:  Patient was awake, alert, oriented to time, person and place.                      Labs:Tissue Pathology Exam: [id number]   Order: [id number]   Status:  Edited Result - FINAL   Visible to patient:  Yes (MyChart) Next appt:  None Dx:  Malignant neoplasm of overlapping sit...   Component    Addendum   Immunohistochemical studies were performed on block 3C for tumor with the following results:     GATA3: Strong and diffusely positive (supporting breast origin)  P63: Negative       All controls reacted appropriately.     Results of hormone receptor studies:     Estrogen Receptor (ER) Status:  POSITIVE  Percentage of cells with nuclear positivity: 90%  Average intensity of staining: Strong  Maik Score: Proportion Score (5) + Intensity Score (3) = 8  Food and Drug Administration (FDA) cleared (specify test/vendor): [employer]  Primary Antibody: SP1     Progesterone Receptor (PgR) Status:   NEGATIVE  Percentage of cells with nuclear positivity: 0%  Average intensity of staining: None  Maik Score: Proportion Score (0) + Intensity Score (0) = 0  Food and Drug Administration (FDA) cleared (specify test/vendor): [employer]  Primary Antibody: 1E2     HER2 by Immunohistochemistry:   NEGATIVE (1+)  Percentage of cells with uniform intense complete membrane stainin%  Food and Drug Administration (FDA) cleared (specify test/vendor): [employer]  Primary Antibody: 4B5        Cold Ischemia and Fixation Times : Meet requirements specified in latest version of the ASCO/CAP guidelines   Cold Ischemia Time: estimated at 30 minutes  Fixation Time: 30 hours     Testing Performed on Block Number(s): 3C     Fixative: Formalin   Addendum electronically signed by Ting Colindres MD on 2019 at 0927   Case Report   Surgical Pathology Report                         Case: [id number]                                   Authorizing Provider:  Joby Barron Jr., MD    Collected:           2019 01:00 PM           Ordering Location:     [facility]  Received:            2019 01:09 PM                                  MAIN OR                                                                       Pathologist:           Ting Colindres MD                                                           Specimens:   1) - [place] Lymph Node, Left axilla sentinel node #1                                               2) - [place] Lymph Node, Left axilla sentinel node #2                                 "               3) - [place] Lymph Node, Right axilla sentinel node #1                                              4) - Breast, Right, Right breast, single stitch marks superior, double long stitch                   conley lateral                                                                                        5) - Breast, Left, Left breast, short stitch marks superior, double long stitch marks                lateral                                                                                              6) - Axilla, Right, Remaining axillary contents                                            Clinical Information    In the final diagnosis, for part 2 labeled \"left axillary sentinel lymph node #2\" there is a typographical error in diagnostic line B. The metastatic focus of tumor measuring 2 mm should read (micrometastasis) and not (macrometastasis). The amended report is updated to reflect this change.      The diagnosis as well as number of positive lymph nodes remains unchanged.    Final Diagnosis   1.  Left Axillary [place] Lymph Node #1, Biopsy (FS):                A. ONE LYMPH NODE, POSITIVE FOR CARCINOMA (1/1).                B.  Largest contiguous metastatic focus measures: 1.3 mm (micrometastasis); (see Comment #1).                C.  Treatment effect is present.               D.  Negative for extranodal extension.      2.  Left  Axillary [place] Lymph Node #2, Biopsy (FS):                A. THREE OF THREE LYMPH NODES, POSITIVE FOR CARCINOMA (3/3).                B.  Largest contiguous metastatic foci measure: 3 mm (macrometastasis), 2 mm (micrometastasis), and 1.3 mm                    (micrometastasis); (see Comment #2).                C.  One of three lymph nodes with treatment effect.               D.  Positive for extranodal extension.     3.  Right Axillary [place] Lymph Node #1, Biopsy (FS):                A. FIVE OF EIGHT LYMPH NODES, POSITIVE FOR CARCINOMA (5/8).               B.  " Largest contiguous metastatic foci measure: 8 mm (macrometastasis), 7 mm (macrometastasis), 6 mm                    (macrometastasis), 2.5 mm (macrometastasis), 2.1 mm (macrometastasis).               C.  Positive for extranodal extension.                D.  One lymph node with biopsy site changes.                E.  Two of eight lymph nodes with treatment effect.      4.  Right Breast, Simple Mastectomy S/P Neoadjuvant Chemotherapy (818 grams):                A.  BREAST PARENCHYMA WITH TREATMENT EFFECT CHANGES, NEGATIVE FOR CARCINOMA                    (see Comment #3).                B.  Columnar cell change with associated calcifications, sclerosing adenosis and                    fibroadenomatoid change.                 C.  Unremarkable skin and nipple.     5.  Left Breast, Simple Mastectomy S/P Neoadjuvant Chemotherapy (539 grams):                A.  FIBROTIC TUMOR BED WITH MULTIPLE FOCI OF RESIDUAL INVASIVE DUCTAL CARCINOMA,                    Moderately differentiated; Keith Histologic grade II/III (tubule score =3, nuclear score =2, mitoses                    score =1).                            1.  Size of largest focus: 35 mm (see Comment #4).                            2.  Margins are negative for invasive carcinoma; Closest distance: 0.25 mm from the deep margin.               B.  Invasive carcinoma extends and involves nipple and dermis of skin.                C.  Positive for lymphovascular space invasion.                D.  No in situ carcinoma is identified.                E.  Skin epidermis is uninvolved by carcinoma.               F.  Invasive carcinoma abuts but does not involve skeletal muscle.                G. See Synoptic Report (left breast) to include parts 1-3 and 6.     6.  Lymph Nodes, Remaining Axillary Contents, Dissection:                A.  ONE LYMPH NODE, POSITIVE FOR CARCINOMA (1/1).               B.  Metastatic focus measures 4 mm (macrometastasis).                C.   Negative for extranodal extension.                D.  Negative for treatment effect.        jab/brb    Amendment electronically signed by Ting Colinrdes MD on 10/10/2019 at 1657   Electronically signed by Ting Colindres MD on 9/20/2019 at 1732   Synoptic Checklist   INVASIVE CARCINOMA OF THE BREAST: Resection   Breast Invasive - All Specimens   CLINICAL   Clinical History  Prior presurgical (neoadjuvant) therapy for this diagnosis of invasive carcinoma    Radiologic Finding  Mass or architectural distortion    SPECIMEN   Procedure  Total mastectomy    Specimen Laterality  Left    TUMOR   Tumor Site: Invasive Carcinoma  Central    Histologic Type  Invasive carcinoma of no special type (ductal, not otherwise specified)    Glandular (Acinar) / Tubular Differentiation  Score 3    Nuclear Pleomorphism  Score 2    Mitotic Rate  Score 1 (<=3 mitoses per mm2)    Overall Grade  Grade 2 (scores of 6 or 7)    Tumor Size  Greatest dimension of largest invasive focus in Millimeters (mm): 35 Millimeters (mm)   Tumor Focality  Multiple foci of invasive carcinoma    Ductal Carcinoma In Situ (DCIS)  Not identified    Lobular Carcinoma In Situ (LCIS)  No LCIS in specimen    Tumor Extent     Skin Invasion  Invasive carcinoma directly invades into the dermis or epidermis without skin ulceration (this does not change the T stage)    Nipple DCIS  Not applicable    Accessory Findings     Lymphovascular Invasion  Present    Dermal Lymphovascular Invasion  Not identified    Microcalcifications  Present in non-neoplastic tissue    Treatment Effect in the Breast  No definite response to presurgical therapy in the invasive carcinoma    Treatment Effect in the Lymph Nodes  No definite response to presurgical therapy in metastatic carcinoma    MARGINS   Invasive Carcinoma Margins  Uninvolved by invasive carcinoma    Distance from Closest Margin in Millimeters (mm)  Distance is < 1 Millimeter (mm)    Closest Margin  Posterior    LYMPH NODES    Regional Lymph Nodes  Involved by tumor cells    Number of Lymph Nodes with Macrometastases (> 2 mm)  1    Number of Lymph Nodes with Micrometastases (> 0.2 mm to 2 mm and / or > 200 cells)  3    Number of Lymph Nodes with Isolated Tumor Cells (<= 0.2 mm and <= 200 cells)  0    Size of Largest Metastatic Deposit in Millimeters (mm)  3 Millimeters (mm)   Extranodal Extension  Present    Number of Lymph Nodes Examined  13 (to include right axillary sentinel lymph nodes)    Number of [place] Nodes Examined  12 (to include right axillary sentinel lymph nodes)    PATHOLOGIC STAGE CLASSIFICATION (pTNM, AJCC 8th Edition)   TNM Descriptors  m (multiple foci of invasive carcinoma)      y (post-treatment)    Primary Tumor (Invasive Carcinoma) (pT)  pT2    Regional Lymph Nodes (pN)     Category (pN)  pN2a    Distant Metastasis (pM)  pM1    Site  Contralateral axillary lymph nodes (specimen #3)    .   Breast Biomarker Reporting Template   Breast Bmk - 5   Test(s) Performed     Estrogen Receptor (ER) Results  Positive    Percentage of Cells with Nuclear Positivity  >95 %   Average Intensity of Staining  Strong    Test Type  Food and Drug Administration (FDA) cleared (test / vendor): [employer]    Primary Antibody  SP1    Scoring System  Maik    Proportion Score  5    Intensity Score  3    Total Maik Score  8    Test(s) Performed     Progesterone Receptor (PgR) Status  Negative (less than 1%)      Internal control cells absent    Test Type  Food and Drug Administration (FDA) cleared (test / vendor): [employer]    Primary Antibody  1E2    Scoring System  Maik    Proportion Score  0    Intensity Score  0    Total Maik Score  0    Test(s) Performed     HER2 by Immunohistochemistry  Negative (Score 1+)    Test Type  Food and Drug Administration (FDA) cleared (test / vendor): [employer]    Primary Antibody  4B5    Cold Ischemia and Fixation Times  Meet requirements specified in latest version of the ASCO / CAP Guidelines    Cold  "Ischemia Time (minutes)  9 min   Fixation Time (hours)  29.75 hours   Testing Performed on Block Number(s)  5H    METHODS   Fixative  Formalin    .      Comment    1) The size of the largest contiguous metastatic focus is best seen on the frozen section slide 1A. AE1/AE3 immunostain was performed on block 1A and highlights multiple metastatic foci scattered throughout the treated lymph node. Control tissue reacted appropriately.     2) AE1/AE3 immunostain was performed on both blocks 2A and 2B and highlights multiple metastatic foci with the largest contiguous foci reported above in the final diagnosis. One lymph node demonstrates treatment effect. Control tissue reacted appropriately.       3) RIGHT BREAST (specimen #4):     The patient presented with a 6.0 cm right axillary tail mass as seen on the mammogram dated from January 2019.   This mass was biopsied and diagnosed as invasive ductal carcinoma, grade 2, that was estrogen receptor positive (Strong, %), progesterone receptor positive (Moderate, 1-10%), HER2 equivocal by IHC and not amplified by FISH (reviewed as [id number]).  Additionally, bilateral axillary lymphadenopathy was noted on imaging.       The patient received neoadjuvant chemotherapy.      Within specimen #3, labeled \"Right axillary sentinel lymph node #1\" are multiple matted lymph nodes containing a significant amount of tumor burden with large, pleomorphic cells. One lymph node contains biopsy site changes.     Within specimen #4, labeled \"Right breast,\" there is no evidence of in situ or invasive carcinoma.     Per discussion with Dr. Juan J Guevara ([facility] radiologist), review of the mammogram dated 1/29/2019 confirms the presence of a mass in the right axillary tail region and only clusters of calcifications are seen within the breast proper. The prior core needle biopsy was reviewed and, although there was no lymphoid tissue present in the sample, based on our conversation the " ultrasound-guided core needle biopsy performed at that time could have represented a completely replaced lymph node within this region.      Therefore, based on the histologic findings, radiology review, and clinical history, as well as the lack of invasive carcinoma present in the extensively sampled right breast, the positive lymph nodes seen in the right axilla (specimen #3) most likely represent metastatic carcinoma from the contralateral side. This is reflected in the final pathologic stage with an M1 designation.     Given the high grade histology of the carcinoma in the right axilla, hormone receptors will be performed and those results will be reported in an addendum.      4) LEFT BREAST (specimen #5):     The patient presented with a large 12.3 cm lobulated mass within the upper aspect of the left breast that infiltrated the skin with bilateral axillary lymphadenopathy.  This mass was diagnosed as invasive ductal carcinoma, grade 2, that was estrogen receptor positive (Strong, %), progesterone receptor positive (Strong, 11-20%), HER2 equivocal by immunohistochemistry and not amplified by FISH (reviewed as [id number]).     The patient received neoadjuvant chemotherapy.       On gross examination of the mastectomy specimen, a fibrous area measuring 9.0 x 6.2 x 3.3 cm is identified. This correlates with histologic tumor bed containing numerous, large groups of residual invasive ductal carcinoma with the largest focus spanning five consecutive tissue slices (#6-10), each slice measuring 7 mm in thickness, for a greatest dimension of 35 mm. Metastatic carcinoma is present in 4 of 4 left axillary lymph nodes. Treatment effect is present. The Residual Cancer Throckmorton is calculated at 4.021(RCB-III).     jab/brb    Intraoperative Consultation    #1FS DX:  Left axilla sentinel node #1:  One node with a 1 mm focus positive for carcinoma.  This case was read by Dr. Nichols.  SP/USO/KSENIA/brb       Results were given  to Dr. Joby Barron at 1:15 pm.       #2FS DX:  Left axilla sentinel node #3:  Three nodes, 1 positive with a 3 mm focus positive for carcinoma.  This case was read by Dr. Nichols.  SP/USO/KSENIA/brb       Results were given to Dr. Joby Barron at 1:43 pm.       #3FS DX (3AFS-3FFS):  Pleomorphic malignant cells present.  Differential diagnosis includes carcinoma versus hematologic malignancy.  Per Dr. Colindres, [facility].  HT/USO/JAB/brb     Results are called to Dr. Joby Barron at 14:12.             Intradepartmental Consult    Select slides were shared in internal consultation with Brandon Zayas and Michael, who concur.              Ref Range & Units 10:35   WBC  3.40 - 10.80 10*3/mm3 4.99    RBC  3.77 - 5.28 10*6/mm3 3.68 Abnormally low     Hemoglobin  12.0 - 15.9 g/dL 11.0 Abnormally low     Hematocrit  34.0 - 46.6 % 35.5    MCV  79.0 - 97.0 fL 96.5    MCH  26.6 - 33.0 pg 29.9    MCHC  31.5 - 35.7 g/dL 31.0 Abnormally low     RDW  12.3 - 15.4 % 13.8    RDW-SD  37.0 - 54.0 fl 49.3    MPV  6.0 - 12.0 fL 8.4    Platelets  140 - 450 10*3/mm3 206    Neutrophil %  42.7 - 76.0 % 66.2    Lymphocyte %  19.6 - 45.3 % 22.8    Monocyte %  5.0 - 12.0 % 7.6    Eosinophil %  0.3 - 6.2 % 2.6    Basophil %  0.0 - 1.5 % 0.6    Immature Grans %  0.0 - 0.5 % 0.2    Neutrophils, Absolute  1.70 - 7.00 10*3/mm3 3.30      Comprehensive Metabolic Panel   Order: [id number]   Status:  Final result   Visible to patient:  No (Not Released) Next appt:  12/05/2019 at 09:30 AM in Oncology ([facility]) Dx:  Fitting and adjustment of vascular ca...   Component  Ref Range & Units 10:46 4wk ago 2mo ago   Glucose  74 - 124 mg/dL 104  98 R 65 Abnormally low     BUN  6 - 20 mg/dL 17  11 R 17    Creatinine  0.60 - 1.10 mg/dL 0.70  0.72 R 0.75    Sodium  134 - 145 mmol/L 140  139 R 141    Potassium  3.5 - 4.7 mmol/L 4.3  4.0 R 3.8    Chloride  98 - 107 mmol/L 103  103  101    CO2  22.0 - 29.0 mmol/L 24.4  28.4  27.5     Calcium  8.5 - 10.2 mg/dL 9.3  8.5 Abnormally low  R 9.6    Total Protein  6.3 - 8.0 g/dL 6.7   7.0    Albumin  3.50 - 5.20 g/dL 4.50   4.60    ALT (SGPT)  0 - 33 U/L 21   40 Abnormally high     AST (SGOT)  0 - 32 U/L 26   32    Alkaline Phosphatase  38 - 116 U/L 81   96    Total Bilirubin  0.2 - 1.2 mg/dL 0.3   0.3    eGFR Non African Amer  >60 mL/min/1.73 85  82  79    Globulin  1.8 - 3.5 gm/dL 2.2   2.4    A/G Ratio  1.1 - 2.4 g/dL 2.0   1.9    BUN/Creatinine Ratio  7.3 - 30.0 24.3  15.3 R 22.7    Anion Gap  5.0 - 15.0 mmol/L 12.6  7.6                                         RECENT LABS:  Hematology WBC   Date Value Ref Range Status   10/15/2019 4.99 3.40 - 10.80 10*3/mm3 Final     RBC   Date Value Ref Range Status   10/15/2019 3.68 (L) 3.77 - 5.28 10*6/mm3 Final     Hemoglobin   Date Value Ref Range Status   10/15/2019 11.0 (L) 12.0 - 15.9 g/dL Final     Hematocrit   Date Value Ref Range Status   10/15/2019 35.5 34.0 - 46.6 % Final     Platelets   Date Value Ref Range Status   10/15/2019 206 140 - 450 10*3/mm3 Final              Assessment/Plan  In summary, this patient is a 59-year-old white female 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 At least for the last 4 years, she has had an in crescendo mass in the left breast that has produced a gigantic tumor that is now close to 25 cm in size and is replacing most of the anatomy of the left breast. There are areas of ulceration necrosis and bleeding and the mass is fixed to the chest wall. She has abundant amount of collateral circulation in the left anterior chest wall and it caught my attention the lack of any left axillary adenopathy. She has no lymphedema in the left upper extremity. In the right breast while in sitting position, no palpable abnormalities are documented. The right breast skin and nipple are normal. When the patient lies flat, there is a palpable mass at 9  o'clock from the nipple that measures probably 4 cm in size, very indistinct in regard to edges and margins. There was no mobility and no areas of tenderness. She has a giant adenopathy in the right axilla that measures close to 9 cm in size, uniform, no fluctuation, nontender, completely fixed to the axillary wall. There is no compromise of the skin.         Since the previous visit the patient has completed all of her plan of chemotherapy neoadjuvantly. She has had a very dramatic response not only to the primary tumor in the left breast, her left breast remind ourselves was 3 times the normal size and had an ulcerated mass that was huge with bleeding and necrosis. This has resolved. She had no left axillary adenopathy but she had an induration in the right breast and most importantly she had almost a 7 cm mass in the right axilla. Since completion of chemotherapy all of these issues have resolved near completely. She has had residual mass behind the scar in the left breast that measures close to 4 cm, no left axillary adenopathy and near complete resolution of the right axillary adenopathy. No nodularity in the right breast.     On 10/15/2019 the patient completed several weeks ago her bilateral mastectomy and axillary lymph node dissection. She had no residual malignancy on any level in the chest wall. There is a tumor that had very dramatic regression in the left breast from almost 25 cm in diameter to 3.5, still positive left axillary lymph nodes. The size of them were smaller than originally thought. The right breast disclosed no primary tumor. She had a tumor located on the CT scan there before and clinically documented. The right axilla disclosed still positive lymph nodes, not surprising she had a large tumoral lesion right there. Now she has nothing left.     The patient continues having some pain in the right hip that we know is not related to malignancy. She had MRI at the time of original diagnosis and  disclosed degenerative changes.    The patient already has been taking her Femara adjuvant therapy and I encouraged her to remain on this. The tumor site was ER positive, [place] negative.     I also advised the patient that it is time to further be reviewed by radiation oncology. I think it is a necessity for her to receive radiation therapy to the left chest wall and the left axilla and to the right side as well.     The patient has a high propensity given the findings and the clinical examination to develop lymphedema in the future. For this reason she has been referred to the lymphedema clinic computed tomography and proper assessment. I gave her generalities about lymphedema care. She understood everything.     In regard to her anemia associated with neoplastic disease and chemotherapy she is doing dramatically better, her hemoglobin has almost normalized and the platelet count has normalized as well.    I do believe that the patient has had a very dramatic response to her treatment. The long term situation with this patient is as follows:  1. She will remain on the Femara for the time being 2.5 mg a day not stopping until she has recurrence of some sort.   2. The patient will have referral for survivorship clinic.  3. The patient will have a referral for lymphedema clinic.  4. The patient will be having a referral once that her hip area improves further through orthopedic medicine.  5. The patient was advised to continue her Femara.  6. We made her an appointment to be seen by radiation oncology. She will require therapy for both axillas and for her left chest wall.  7. The patient will return to see us in 6 weeks.  8. Her port will remain open and flushed every 6 weeks.  9. I discussed all of these facts with the patient and her brother in detail.     She will return in 6 weeks.

## 2019-10-22 ENCOUNTER — TELEPHONE (OUTPATIENT)
Dept: SURGERY | Facility: CLINIC | Age: 59
End: 2019-10-22

## 2019-10-22 ENCOUNTER — APPOINTMENT (OUTPATIENT)
Dept: RADIATION ONCOLOGY | Facility: HOSPITAL | Age: 59
End: 2019-10-22

## 2019-10-22 ENCOUNTER — CONSULT (OUTPATIENT)
Dept: RADIATION ONCOLOGY | Facility: HOSPITAL | Age: 59
End: 2019-10-22

## 2019-10-22 VITALS — SYSTOLIC BLOOD PRESSURE: 120 MMHG | DIASTOLIC BLOOD PRESSURE: 83 MMHG | OXYGEN SATURATION: 97 % | HEART RATE: 67 BPM

## 2019-10-22 DIAGNOSIS — C50.812 MALIGNANT NEOPLASM OF OVERLAPPING SITES OF BOTH BREASTS IN FEMALE, ESTROGEN RECEPTOR POSITIVE (HCC): Primary | ICD-10-CM

## 2019-10-22 DIAGNOSIS — Z17.0 MALIGNANT NEOPLASM OF OVERLAPPING SITES OF BOTH BREASTS IN FEMALE, ESTROGEN RECEPTOR POSITIVE (HCC): Primary | ICD-10-CM

## 2019-10-22 DIAGNOSIS — C50.811 MALIGNANT NEOPLASM OF OVERLAPPING SITES OF BOTH BREASTS IN FEMALE, ESTROGEN RECEPTOR POSITIVE (HCC): Primary | ICD-10-CM

## 2019-10-22 PROCEDURE — 77370 RADIATION PHYSICS CONSULT: CPT | Performed by: RADIOLOGY

## 2019-10-22 PROCEDURE — G0463 HOSPITAL OUTPT CLINIC VISIT: HCPCS | Performed by: RADIOLOGY

## 2019-10-22 PROCEDURE — 99243 OFF/OP CNSLTJ NEW/EST LOW 30: CPT | Performed by: RADIOLOGY

## 2019-10-23 PROCEDURE — 77290 THER RAD SIMULAJ FIELD CPLX: CPT | Performed by: RADIOLOGY

## 2019-10-23 PROCEDURE — 77263 THER RADIOLOGY TX PLNG CPLX: CPT | Performed by: RADIOLOGY

## 2019-10-23 PROCEDURE — 77333 RADIATION TREATMENT AID(S): CPT | Performed by: RADIOLOGY

## 2019-10-23 NOTE — TELEPHONE ENCOUNTER
Please let the patient know that I have sent a message to Dr. Dixon to make sure he is okay with Mediport removal.  If he gives me the okay, we will schedule the procedure.  Thanks

## 2019-10-28 ENCOUNTER — OFFICE VISIT (OUTPATIENT)
Dept: ORTHOPEDIC SURGERY | Facility: CLINIC | Age: 59
End: 2019-10-28

## 2019-10-28 VITALS — HEIGHT: 66 IN | WEIGHT: 159.8 LBS | BODY MASS INDEX: 25.68 KG/M2

## 2019-10-28 DIAGNOSIS — M16.11 ARTHRITIS OF RIGHT HIP: ICD-10-CM

## 2019-10-28 DIAGNOSIS — Z45.2 ENCOUNTER FOR VENOUS ACCESS DEVICE CARE: Primary | ICD-10-CM

## 2019-10-28 DIAGNOSIS — M25.551 PAIN OF RIGHT HIP JOINT: Primary | ICD-10-CM

## 2019-10-28 PROCEDURE — 73502 X-RAY EXAM HIP UNI 2-3 VIEWS: CPT | Performed by: ORTHOPAEDIC SURGERY

## 2019-10-28 PROCEDURE — 99204 OFFICE O/P NEW MOD 45 MIN: CPT | Performed by: ORTHOPAEDIC SURGERY

## 2019-10-28 NOTE — PROGRESS NOTES
Patient Name: Marylu Georges   YOB: 1960  Referring Primary Care Physician: Provider, No Known  BMI: Body mass index is 25.79 kg/m².    Chief Complaint:    Chief Complaint   Patient presents with   • Right Hip - Follow-up, Pain        HPI:     Marylu Georges is a 59 y.o. female who presents today for evaluation of   Chief Complaint   Patient presents with   • Right Hip - Follow-up, Pain   .  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  She does hot walks etc. and likes right shoulder horses she has a history of insidious onset right achy hip pain.  Is been hurting her more more over the last several months.  Currently undergoing breast cancer treatment with Dr. Constantino and is finished chemo and getting ready to start radiation.  He noticed she was limping want to have her looked at.  She is supposed to start 6 weeks of radiation next week.  She is taken diclofenac and about 5-8 ibuprofen gelcaps a day.  She has noticed that the hip is been stiff over the years and uses a tackle box to stand on to mount horses.  Went over proper dosing and precautions  This problem is new to this examiner.     Subjective   Medications:   Home Medications:  Current Outpatient Medications on File Prior to Visit   Medication Sig   • acetaminophen (TYLENOL) 500 MG tablet Take 500 mg by mouth Every 6 (Six) Hours As Needed for Mild Pain .   • aspirin 81 MG EC tablet Take 1 tablet by mouth Daily.   • cephalexin (KEFLEX) 500 MG capsule Pt to only take this medicine as needed   • DICLOFENAC PO Take  by mouth.   • IBUPROFEN PO Take  by mouth.   • letrozole (FEMARA) 2.5 MG tablet Take 1 tablet by mouth Daily.   • meclizine (ANTIVERT) 25 MG tablet Take 1 tablet by mouth 2 (Two) Times a Day As Needed for dizziness.   • metoprolol succinate XL (TOPROL XL) 25 MG 24 hr tablet Take 1 tablet by mouth Daily.   • Multiple Vitamin (MULTI VITAMIN DAILY PO) Take  by mouth.   • olopatadine (PATANOL) 0.1 % ophthalmic solution  Administer 1 drop to both eyes 2 (Two) Times a Day.   • ondansetron (ZOFRAN) 4 MG tablet Take 1 tablet by mouth Every 6 (Six) Hours As Needed for Nausea or Vomiting for up to 20 doses.   • oxyCODONE-acetaminophen (PERCOCET) 5-325 MG per tablet Take 1 tablet by mouth Every 6 (Six) Hours As Needed for pain     No current facility-administered medications on file prior to visit.      Current Medications:  Scheduled Meds:  Continuous Infusions:  No current facility-administered medications for this visit.   PRN Meds:.    I have reviewed the patient's medical history in detail and updated the computerized patient record.  Review and summarization of old records includes:    Past Medical History:   Diagnosis Date   • Anemia in neoplastic disease    • Arthritis    • Breast cancer (CMS/HCC)     Left   • CVA (cerebral vascular accident) (CMS/HCC)    • Hip pain     RIGHT HIP... CYST   • History of fracture of leg 1987   • Skin sore     OPEN SORE LEFT BREAST   • Syncope    • Vertigo         Past Surgical History:   Procedure Laterality Date   • FRACTURE SURGERY  1987    Leg   • HARDWARE REMOVAL     • MASTECTOMY W/ SENTINEL NODE BIOPSY Bilateral 9/16/2019    Procedure: BILATERLA MODIFIED RADICAL MASTECTOMY WITH BILATERAL SENTINEL LYMPH NODE BIOPSY;  Surgeon: Joby Barron Jr., MD;  Location: [facility] OR;  Service: General   • VENOUS ACCESS DEVICE (PORT) INSERTION Right 2/1/2019    Procedure: INSERTION VENOUS ACCESS DEVICE;  Surgeon: Joby Barron Jr., MD;  Location: [facility];  Service: General        Social History     Occupational History   • Occupation:      Employer: SELF-EMPLOYED   Tobacco Use   • Smoking status: Never Smoker   • Smokeless tobacco: Never Used   Substance and Sexual Activity   • Alcohol use: Yes     Alcohol/week: 4.2 oz     Types: 4 Glasses of wine, 3 Cans of beer per week     Frequency: 2-3 times a week     Drinks per session: 1 or 2     Binge frequency: Never     Comment:  "OCCASIONALLY   • Drug use: No   • Sexual activity: Not Currently     Partners: Male     Birth control/protection: Post-menopausal    Social History     Social History Narrative   • Not on file        Family History   Problem Relation Age of Onset   • Lung cancer Father    • Cancer Mother    • Breast cancer Mother    • Liver disease Mother    • Breast cancer Sister 48   • Breast cancer Maternal Grandmother    • Breast cancer Paternal Grandmother    • Breast cancer Maternal Uncle    • Malig Hyperthermia Neg Hx        ROS: 14 point review of systems was performed and all other systems were reviewed and are negative except for documented findings in HPI and today's encounter.     Allergies:   Allergies   Allergen Reactions   • Erythromycin GI Intolerance   • Levaquin [Levofloxacin] GI Intolerance   • Penicillins GI Intolerance     Constitutional:  Denies fever, shaking or chills   Eyes:  Denies change in visual acuity   HENT:  Denies nasal congestion or sore throat   Respiratory:  Denies cough or shortness of breath   Cardiovascular:  Denies chest pain or severe LE edema   GI:  Denies abdominal pain, nausea, vomiting, bloody stools or diarrhea   Musculoskeletal:  Numbness, tingling, pain, or loss of motor function only as noted above in history of present illness.  : Denies painful urination or hematuria  Integument:  Denies rash, lesion or ulceration   Neurologic:  Denies headache or focal weakness  Endocrine:  Denies lymphadenopathy  Psych:  Denies confusion or change in mental status   Hem:  Denies active bleeding    OBJECTIVE:  Physical Exam: 59 y.o. female  Wt Readings from Last 3 Encounters:   10/28/19 72.5 kg (159 lb 12.8 oz)   10/15/19 73.4 kg (161 lb 12.8 oz)   10/01/19 70.2 kg (154 lb 12.8 oz)     Ht Readings from Last 1 Encounters:   10/28/19 167.6 cm (66\")     Body mass index is 25.79 kg/m².  There were no vitals filed for this visit.  Vital signs reviewed.     General Appearance:    Alert, cooperative, in " no acute distress                  Eyes: conjunctiva clear  ENT: external ears and nose atraumatic  CV: no peripheral edema  Resp: normal respiratory effort  Skin: no rashes or wounds; normal turgor  Psych: mood and affect appropriate  Lymph: no nodes appreciated  Neuro: gross sensation intact  Vascular:  Palpable peripheral pulse in noted extremity  Musculoskeletal Extremities: Exam today shows very pleasant lady she has positive [place] she has very little internal/external rotation the hip and does re-create her pain she is not tender to palpation and she walks with a start up limp    Radiology:   AP of the hips lateral right hip taken the office today without comparison views show severe end-stage arthritis of the hip.  She has moderate changes on the left and some degenerative changes in her lower spine    Assessment:     ICD-10-CM ICD-9-CM   1. Pain of right hip joint M25.551 719.45   2. Arthritis of right hip M16.11 716.95        Procedures       Plan: Biomechanics of pertinent body area discussed.  Risks, benefits, alternatives, comparisons, and complications of accepted medicines, injections, recommendations, surgical procedures, and therapies explained and education provided in laymen's terms. Natural history and expected course of this patient's diagnosis discussed along with evaluation of therapies. Questions answered. When appropriate I also discussed proper use of cane, walker, trekking poles.   MEDICATIONS:  Prescription, OTC and Monitoring of Medications per orders to address ortho complaints; Evaluation and discussion of safety, precautions, side effects, and warnings given especially of long term NSAID or steroid therapy.    RICE: Rest, ice, compression, and elevation therapy, Cryotherapy/brachy therapy, and or OTC linaments as indicated with instructions.   PT referral offered and declined.  MEDICAL RECORDS reviewed from other provider(s) for past and current medical history pertinent to this  complaint.we discussed therapy but is stiff as the hip is may not help her in getting my experience exacerbate her symptoms but that remains an option she did agree to try some gentle exercises that I explained gave her handout on.  She is also very active riding horses etc.  I think she would do well with a corticosteroid injection in the right hip but she is starting radiation therapy at this time and would want to give every effort to do this.  She is going to check with her oncologist if it is okay and she desires she could call we could set her up for an injection at the hospital with follow-up in a month or so after.  I told her she absolutely has to take one medicine or the other and she could supplement with Tylenol but cannot continue to take diclofenac and ibuprofen together as this may cause increased risk of organ damage she wishes to schedule it she should call and be scheduled and then a month later seen with seen x-rays      10/28/2019    Much of this encounter note is an electronic transcription/translation of spoken language to printed text. The electronic translation of spoken language may permit erroneous, or at times, nonsensical words or phrases to be inadvertently transcribed; Although I have reviewed the note for such errors, some may still exist

## 2019-10-28 NOTE — PATIENT INSTRUCTIONS
"Hip Exercises  Ask your health care provider which exercises are safe for you. Do exercises exactly as told by your health care provider and adjust them as directed. It is normal to feel mild stretching, pulling, tightness, or discomfort as you do these exercises, but you should stop right away if you feel sudden pain or your pain gets worse. Do not begin these exercises until told by your health care provider.  Stretching and range of motion exercises  These exercises warm up your muscles and joints and improve the movement and flexibility of your hip. These exercises also help to relieve pain, numbness, and tingling.  Exercise A: Hamstrings, supine    1. Lie on your back.  2. Loop a belt or towel over the ball of your left / right foot. The ball of your foot is on the walking surface, right under your toes.  3. Straighten your left / right knee and slowly pull on the belt to raise your leg.  ? Do not let your left / right knee bend while you do this.  ? Keep your other leg flat on the floor.  ? Raise the left / right leg until you feel a gentle stretch behind your left / right knee or thigh.  4. Hold this position for __________ seconds.  5. Slowly return your leg to the starting position.  Repeat __________ times. Complete this stretch __________ times a day.  Exercise B: Hip rotators    1. Lie on your back on a firm surface.  2. Hold your left / right knee with your left / right hand. Hold your ankle with your other hand.  3. Gently pull your left / right knee and rotate your lower leg toward your other shoulder.  ? Pull until you feel a stretch in your buttocks.  ? Keep your hips and shoulders firmly planted while you do this stretch.  4. Hold this position for __________ seconds.  Repeat __________ times. Complete this stretch __________ times a day.  Exercise C: V-sit (hamstrings and adductors)    1. Sit on the floor with your legs extended in a large \"V\" shape. Keep your knees straight during this " exercise.  2. Start with your head and chest upright, then bend at your waist to reach for your left foot (position A). You should feel a stretch in your right inner thigh.  3. Hold this position for __________ seconds. Then slowly return to the upright position.  4. Bend at your waist to reach forward (position B). You should feel a stretch behind both of your thighs and knees.  5. Hold this position for __________ seconds. Then slowly return to the upright position.  6. Bend at your waist to reach for your right foot (position C). You should feel a stretch in your left inner thigh.  7. Hold this position for __________ seconds. Then slowly return to the upright position.  Repeat __________ times. Complete this stretch __________ times a day.  Exercise D: Lunge (hip flexors)    1. Place your left / right knee on the floor and bend your other knee so that is directly over your ankle. You should be half-kneeling.  2. Keep good posture with your head over your shoulders.  3. Tighten your buttocks to point your tailbone downward. This helps your back to keep from arching too much.  4. You should feel a gentle stretch in the front of your left / right thigh and hip. If you do not feel any resistance, slightly slide your other foot forward and then slowly lunge forward so your knee once again lines up over your ankle.  5. Make sure your tailbone continues to point downward.  6. Hold this position for __________ seconds.  Repeat __________ times. Complete this stretch __________ times a day.  Strengthening exercises  These exercises build strength and endurance in your hip. Endurance is the ability to use your muscles for a long time, even after they get tired.  Exercise E: Bridge (hip extensors)    1. Lie on your back on a firm surface with your knees bent and your feet flat on the floor.  2. Tighten your buttocks muscles and lift your bottom off the floor until the trunk of your body is level with your thighs.  ? Do not  arch your back.  ? You should feel the muscles working in your buttocks and the back of your thighs. If you do not feel these muscles, slide your feet 1-2 inches (2.5-5 cm) farther away from your buttocks.  3. Hold this position for __________ seconds.  4. Slowly lower your hips to the starting position.  5. Let your muscles relax completely between repetitions.  6. If this exercise is too easy, try doing it with your arms crossed over your chest.  Repeat __________ times. Complete this exercise __________ times a day.  Exercise F: Straight leg raises - hip abductors    1. Lie on your side with your left / right leg in the top position. Lie so your head, shoulder, knee, and hip line up with each other. You may bend your bottom knee to help you balance.  2. Roll your hips slightly forward, so your hips are stacked directly over each other and your left / right knee is facing forward.  3. Leading with your heel, lift your top leg 4-6 inches (10-15 cm). You should feel the muscles in your outer hip lifting.  ? Do not let your foot drift forward.  ? Do not let your knee roll toward the ceiling.  4. Hold this position for __________ seconds.  5. Slowly return to the starting position.  6. Let your muscles relax completely between repetitions.  Repeat __________ times. Complete this exercise __________ times a day.  Exercise G: Straight leg raises - hip adductors    1. Lie on your side with your left / right leg in the bottom position. Lie so your head, shoulder, knee, and hip line up. You may place your upper foot in front to help you balance.  2. Roll your hips slightly forward, so your hips are stacked directly over each other and your left / right knee is facing forward.  3. Tense the muscles in your inner thigh and lift your bottom leg 4-6 inches (10-15 cm).  4. Hold this position for __________ seconds.  5. Slowly return to the starting position.  6. Let your muscles relax completely between repetitions.  Repeat  "__________ times. Complete this exercise __________ times a day.  Exercise H: Straight leg raises - quadriceps    1. Lie on your back with your left / right leg extended and your other knee bent.  2. Tense the muscles in the front of your left / right thigh. When you do this, you should see your kneecap slide up or see increased dimpling just above your knee.  3. Tighten these muscles even more and raise your leg 4-6 inches (10-15 cm) off the floor.  4. Hold this position for __________ seconds.  5. Keep these muscles tense as you lower your leg.  6. Relax the muscles slowly and completely between repetitions.  Repeat __________ times. Complete this exercise __________ times a day.  Exercise I: Hip abductors, standing  1. Tie one end of a rubber exercise band or tubing to a secure surface, such as a table or pole.  2. Loop the other end of the band or tubing around your left / right ankle.  3. Keeping your ankle with the band or tubing directly opposite of the secured end, step away until there is tension in the tubing or band. Hold onto a chair as needed for balance.  4. Lift your left / right leg out to your side. While you do this:  ? Keep your back upright.  ? Keep your shoulders over your hips.  ? Keep your toes pointing forward.  ? Make sure to use your hip muscles to lift your leg. Do not \"throw\" your leg or tip your body to lift your leg.  5. Hold this position for __________ seconds.  6. Slowly return to the starting position.  Repeat __________ times. Complete this exercise __________ times a day.  Exercise J: Squats (quadriceps)  1.  a door frame so your feet and knees are in line with the frame. You may place your hands on the frame for balance.  2. Slowly bend your knees and lower your hips like you are going to sit in a chair.  ? Keep your lower legs in a straight-up-and-down position.  ? Do not let your hips go lower than your knees.  ? Do not bend your knees lower than told by your health " care provider.  ? If your hip pain increases, do not bend as low.  3. Hold this position for ___________ seconds.  4. Slowly push with your legs to return to standing. Do not use your hands to pull yourself to standing.  Repeat __________ times. Complete this exercise __________ times a day.  This information is not intended to replace advice given to you by your health care provider. Make sure you discuss any questions you have with your health care provider.  Document Released: 01/05/2007 Document Revised: 04/23/2019 Document Reviewed: 12/12/2016  Elsevier Interactive Patient Education © 2019 [employer]

## 2019-10-30 ENCOUNTER — ANESTHESIA (OUTPATIENT)
Dept: PERIOP | Facility: HOSPITAL | Age: 59
End: 2019-10-30

## 2019-10-30 ENCOUNTER — ANESTHESIA EVENT (OUTPATIENT)
Dept: PERIOP | Facility: HOSPITAL | Age: 59
End: 2019-10-30

## 2019-10-30 ENCOUNTER — HOSPITAL ENCOUNTER (OUTPATIENT)
Facility: HOSPITAL | Age: 59
Setting detail: HOSPITAL OUTPATIENT SURGERY
Discharge: HOME | End: 2019-10-30
Attending: SURGERY | Admitting: SURGERY

## 2019-10-30 VITALS
TEMPERATURE: 97.7 F | HEIGHT: 65 IN | RESPIRATION RATE: 16 BRPM | BODY MASS INDEX: 26.39 KG/M2 | DIASTOLIC BLOOD PRESSURE: 82 MMHG | OXYGEN SATURATION: 99 % | HEART RATE: 58 BPM | WEIGHT: 158.4 LBS | SYSTOLIC BLOOD PRESSURE: 110 MMHG

## 2019-10-30 PROCEDURE — 25010000002 MIDAZOLAM PER 1 MG: Performed by: NURSE ANESTHETIST, CERTIFIED REGISTERED

## 2019-10-30 PROCEDURE — 77295 3-D RADIOTHERAPY PLAN: CPT | Performed by: RADIOLOGY

## 2019-10-30 PROCEDURE — S0260 H&P FOR SURGERY: HCPCS | Performed by: SURGERY

## 2019-10-30 PROCEDURE — 77293 RESPIRATOR MOTION MGMT SIMUL: CPT | Performed by: RADIOLOGY

## 2019-10-30 PROCEDURE — 77334 RADIATION TREATMENT AID(S): CPT | Performed by: RADIOLOGY

## 2019-10-30 PROCEDURE — 77300 RADIATION THERAPY DOSE PLAN: CPT | Performed by: RADIOLOGY

## 2019-10-30 PROCEDURE — 77332 RADIATION TREATMENT AID(S): CPT | Performed by: RADIOLOGY

## 2019-10-30 PROCEDURE — 36590 REMOVAL TUNNELED CV CATH: CPT | Performed by: SURGERY

## 2019-10-30 PROCEDURE — 25010000002 ONDANSETRON PER 1 MG: Performed by: NURSE ANESTHETIST, CERTIFIED REGISTERED

## 2019-10-30 PROCEDURE — 25010000002 PROPOFOL 10 MG/ML EMULSION: Performed by: NURSE ANESTHETIST, CERTIFIED REGISTERED

## 2019-10-30 PROCEDURE — 25010000002 FENTANYL CITRATE (PF) 100 MCG/2ML SOLUTION: Performed by: NURSE ANESTHETIST, CERTIFIED REGISTERED

## 2019-10-30 RX ORDER — EPHEDRINE SULFATE 50 MG/ML
5 INJECTION, SOLUTION INTRAVENOUS ONCE AS NEEDED
Status: DISCONTINUED | OUTPATIENT
Start: 2019-10-30 | End: 2019-10-30 | Stop reason: HOSPADM

## 2019-10-30 RX ORDER — HYDRALAZINE HYDROCHLORIDE 20 MG/ML
5 INJECTION INTRAMUSCULAR; INTRAVENOUS
Status: DISCONTINUED | OUTPATIENT
Start: 2019-10-30 | End: 2019-10-30 | Stop reason: HOSPADM

## 2019-10-30 RX ORDER — ONDANSETRON 2 MG/ML
INJECTION INTRAMUSCULAR; INTRAVENOUS AS NEEDED
Status: DISCONTINUED | OUTPATIENT
Start: 2019-10-30 | End: 2019-10-30 | Stop reason: SURG

## 2019-10-30 RX ORDER — DIPHENHYDRAMINE HCL 25 MG
25 CAPSULE ORAL
Status: DISCONTINUED | OUTPATIENT
Start: 2019-10-30 | End: 2019-10-30 | Stop reason: HOSPADM

## 2019-10-30 RX ORDER — SODIUM CHLORIDE 0.9 % (FLUSH) 0.9 %
3-10 SYRINGE (ML) INJECTION AS NEEDED
Status: DISCONTINUED | OUTPATIENT
Start: 2019-10-30 | End: 2019-10-30 | Stop reason: HOSPADM

## 2019-10-30 RX ORDER — NALOXONE HCL 0.4 MG/ML
0.2 VIAL (ML) INJECTION AS NEEDED
Status: DISCONTINUED | OUTPATIENT
Start: 2019-10-30 | End: 2019-10-30 | Stop reason: HOSPADM

## 2019-10-30 RX ORDER — PROPOFOL 10 MG/ML
VIAL (ML) INTRAVENOUS AS NEEDED
Status: DISCONTINUED | OUTPATIENT
Start: 2019-10-30 | End: 2019-10-30 | Stop reason: SURG

## 2019-10-30 RX ORDER — DIPHENHYDRAMINE HYDROCHLORIDE 50 MG/ML
12.5 INJECTION INTRAMUSCULAR; INTRAVENOUS
Status: DISCONTINUED | OUTPATIENT
Start: 2019-10-30 | End: 2019-10-30 | Stop reason: HOSPADM

## 2019-10-30 RX ORDER — FENTANYL CITRATE 50 UG/ML
50 INJECTION, SOLUTION INTRAMUSCULAR; INTRAVENOUS
Status: DISCONTINUED | OUTPATIENT
Start: 2019-10-30 | End: 2019-10-30 | Stop reason: HOSPADM

## 2019-10-30 RX ORDER — OXYCODONE HYDROCHLORIDE AND ACETAMINOPHEN 5; 325 MG/1; MG/1
TABLET ORAL
Qty: 10 TABLET | Refills: 0 | Status: SHIPPED | OUTPATIENT
Start: 2019-10-30 | End: 2020-03-03 | Stop reason: HOSPADM

## 2019-10-30 RX ORDER — FAMOTIDINE 10 MG/ML
20 INJECTION, SOLUTION INTRAVENOUS ONCE
Status: COMPLETED | OUTPATIENT
Start: 2019-10-30 | End: 2019-10-30

## 2019-10-30 RX ORDER — MIDAZOLAM HYDROCHLORIDE 1 MG/ML
INJECTION INTRAMUSCULAR; INTRAVENOUS AS NEEDED
Status: DISCONTINUED | OUTPATIENT
Start: 2019-10-30 | End: 2019-10-30 | Stop reason: SURG

## 2019-10-30 RX ORDER — GLYCOPYRROLATE 0.2 MG/ML
INJECTION INTRAMUSCULAR; INTRAVENOUS AS NEEDED
Status: DISCONTINUED | OUTPATIENT
Start: 2019-10-30 | End: 2019-10-30 | Stop reason: SURG

## 2019-10-30 RX ORDER — SODIUM CHLORIDE 0.9 % (FLUSH) 0.9 %
3 SYRINGE (ML) INJECTION EVERY 12 HOURS SCHEDULED
Status: DISCONTINUED | OUTPATIENT
Start: 2019-10-30 | End: 2019-10-30 | Stop reason: HOSPADM

## 2019-10-30 RX ORDER — PROPOFOL 10 MG/ML
VIAL (ML) INTRAVENOUS CONTINUOUS PRN
Status: DISCONTINUED | OUTPATIENT
Start: 2019-10-30 | End: 2019-10-30 | Stop reason: SURG

## 2019-10-30 RX ORDER — ACETAMINOPHEN 325 MG/1
650 TABLET ORAL ONCE AS NEEDED
Status: DISCONTINUED | OUTPATIENT
Start: 2019-10-30 | End: 2019-10-30 | Stop reason: HOSPADM

## 2019-10-30 RX ORDER — MIDAZOLAM HYDROCHLORIDE 1 MG/ML
1 INJECTION INTRAMUSCULAR; INTRAVENOUS
Status: DISCONTINUED | OUTPATIENT
Start: 2019-10-30 | End: 2019-10-30 | Stop reason: HOSPADM

## 2019-10-30 RX ORDER — ONDANSETRON 2 MG/ML
4 INJECTION INTRAMUSCULAR; INTRAVENOUS ONCE AS NEEDED
Status: DISCONTINUED | OUTPATIENT
Start: 2019-10-30 | End: 2019-10-30 | Stop reason: HOSPADM

## 2019-10-30 RX ORDER — SODIUM CHLORIDE, SODIUM LACTATE, POTASSIUM CHLORIDE, CALCIUM CHLORIDE 600; 310; 30; 20 MG/100ML; MG/100ML; MG/100ML; MG/100ML
9 INJECTION, SOLUTION INTRAVENOUS CONTINUOUS
Status: DISCONTINUED | OUTPATIENT
Start: 2019-10-30 | End: 2019-10-30 | Stop reason: HOSPADM

## 2019-10-30 RX ORDER — LABETALOL HYDROCHLORIDE 5 MG/ML
5 INJECTION, SOLUTION INTRAVENOUS
Status: DISCONTINUED | OUTPATIENT
Start: 2019-10-30 | End: 2019-10-30 | Stop reason: HOSPADM

## 2019-10-30 RX ORDER — FLUMAZENIL 0.1 MG/ML
0.2 INJECTION INTRAVENOUS AS NEEDED
Status: DISCONTINUED | OUTPATIENT
Start: 2019-10-30 | End: 2019-10-30 | Stop reason: HOSPADM

## 2019-10-30 RX ORDER — MIDAZOLAM HYDROCHLORIDE 1 MG/ML
2 INJECTION INTRAMUSCULAR; INTRAVENOUS
Status: DISCONTINUED | OUTPATIENT
Start: 2019-10-30 | End: 2019-10-30 | Stop reason: HOSPADM

## 2019-10-30 RX ORDER — FENTANYL CITRATE 50 UG/ML
INJECTION, SOLUTION INTRAMUSCULAR; INTRAVENOUS AS NEEDED
Status: DISCONTINUED | OUTPATIENT
Start: 2019-10-30 | End: 2019-10-30 | Stop reason: SURG

## 2019-10-30 RX ORDER — MAGNESIUM HYDROXIDE 1200 MG/15ML
LIQUID ORAL AS NEEDED
Status: DISCONTINUED | OUTPATIENT
Start: 2019-10-30 | End: 2019-10-30 | Stop reason: HOSPADM

## 2019-10-30 RX ORDER — LIDOCAINE HYDROCHLORIDE 10 MG/ML
0.5 INJECTION, SOLUTION EPIDURAL; INFILTRATION; INTRACAUDAL; PERINEURAL ONCE AS NEEDED
Status: DISCONTINUED | OUTPATIENT
Start: 2019-10-30 | End: 2019-10-30 | Stop reason: HOSPADM

## 2019-10-30 RX ORDER — LIDOCAINE HYDROCHLORIDE 20 MG/ML
INJECTION, SOLUTION INFILTRATION; PERINEURAL AS NEEDED
Status: DISCONTINUED | OUTPATIENT
Start: 2019-10-30 | End: 2019-10-30 | Stop reason: SURG

## 2019-10-30 RX ORDER — LIDOCAINE HYDROCHLORIDE 10 MG/ML
INJECTION, SOLUTION EPIDURAL; INFILTRATION; INTRACAUDAL; PERINEURAL AS NEEDED
Status: DISCONTINUED | OUTPATIENT
Start: 2019-10-30 | End: 2019-10-30 | Stop reason: HOSPADM

## 2019-10-30 RX ADMIN — FAMOTIDINE 20 MG: 10 INJECTION INTRAVENOUS at 08:56

## 2019-10-30 RX ADMIN — ONDANSETRON 4 MG: 2 INJECTION INTRAMUSCULAR; INTRAVENOUS at 10:33

## 2019-10-30 RX ADMIN — SODIUM CHLORIDE, POTASSIUM CHLORIDE, SODIUM LACTATE AND CALCIUM CHLORIDE 9 ML/HR: 600; 310; 30; 20 INJECTION, SOLUTION INTRAVENOUS at 08:34

## 2019-10-30 RX ADMIN — GLYCOPYRROLATE 0.2 MCG: 0.2 INJECTION INTRAMUSCULAR; INTRAVENOUS at 10:13

## 2019-10-30 RX ADMIN — Medication 2 MG: at 10:02

## 2019-10-30 RX ADMIN — FENTANYL CITRATE 50 MCG: 50 INJECTION INTRAMUSCULAR; INTRAVENOUS at 10:39

## 2019-10-30 RX ADMIN — PROPOFOL 80 MG: 10 INJECTION, EMULSION INTRAVENOUS at 10:05

## 2019-10-30 RX ADMIN — FENTANYL CITRATE 25 MCG: 50 INJECTION INTRAMUSCULAR; INTRAVENOUS at 10:19

## 2019-10-30 RX ADMIN — LIDOCAINE HYDROCHLORIDE 60 MG: 20 INJECTION, SOLUTION INFILTRATION; PERINEURAL at 10:05

## 2019-10-30 RX ADMIN — PROPOFOL 75 MCG/KG/MIN: 10 INJECTION, EMULSION INTRAVENOUS at 10:05

## 2019-10-30 RX ADMIN — PROPOFOL 40 MG: 10 INJECTION, EMULSION INTRAVENOUS at 10:22

## 2019-10-30 RX ADMIN — FENTANYL CITRATE 25 MCG: 50 INJECTION INTRAMUSCULAR; INTRAVENOUS at 10:11

## 2019-10-30 NOTE — ANESTHESIA POSTPROCEDURE EVALUATION
Patient: Marylu Georges    Procedure Summary     Date:  10/30/19 Room / Location:  [facility] OR  / [facility] MAIN OR    Anesthesia Start:  1000 Anesthesia Stop:  1045    Procedure:  Mediport Removal (N/A ) Diagnosis:       Encounter for venous access device care      (Encounter for venous access device care [Z45.2])    Surgeon:  Joby Barron Jr., MD Provider:  Jax Villar MD    Anesthesia Type:  MAC ASA Status:  3          Anesthesia Type: MAC  Last vitals  BP   110/82 (10/30/19 1130)   Temp   36.5 °C (97.7 °F) (10/30/19 1043)   Pulse   58 (10/30/19 1130)   Resp   16 (10/30/19 1130)     SpO2   99 % (10/30/19 1130)     Post Anesthesia Care and Evaluation    Patient location during evaluation: PACU  Patient participation: complete - patient participated  Level of consciousness: awake and alert  Pain management: adequate  Airway patency: patent  Anesthetic complications: No anesthetic complications    Cardiovascular status: acceptable  Respiratory status: acceptable  Hydration status: acceptable    Comments: --------------------            10/30/19               1130     --------------------   BP:       110/82     Pulse:      58       Resp:       16       Temp:                SpO2:      99%      --------------------

## 2019-10-30 NOTE — ANESTHESIA PREPROCEDURE EVALUATION
Anesthesia Evaluation     Patient summary reviewed and Nursing notes reviewed   NPO Solid Status: > 8 hours  NPO Liquid Status: > 2 hours           Airway   Mallampati: II  Neck ROM: full  No difficulty expected  Dental      Comment: discoloration from antibiotics    Pulmonary     breath sounds clear to auscultation  Cardiovascular     Rhythm: regular        Neuro/Psych  (+) CVA, dizziness/light headedness, syncope,     GI/Hepatic/Renal/Endo      Musculoskeletal     Abdominal    Substance History      OB/GYN          Other   arthritis,    history of cancer active                    Anesthesia Plan    ASA 3     MAC     intravenous induction   Anesthetic plan, all risks, benefits, and alternatives have been provided, discussed and informed consent has been obtained with: patient.

## 2019-10-31 PROCEDURE — 77280 THER RAD SIMULAJ FIELD SMPL: CPT | Performed by: RADIOLOGY

## 2019-10-31 PROCEDURE — 77412 RADIATION TX DELIVERY LVL 3: CPT | Performed by: RADIOLOGY

## 2019-10-31 PROCEDURE — 77427 RADIATION TX MANAGEMENT X5: CPT | Performed by: RADIOLOGY

## 2019-11-01 ENCOUNTER — APPOINTMENT (OUTPATIENT)
Dept: RADIATION ONCOLOGY | Facility: HOSPITAL | Age: 59
End: 2019-11-01

## 2019-11-01 PROCEDURE — 77412 RADIATION TX DELIVERY LVL 3: CPT | Performed by: RADIOLOGY

## 2019-11-01 PROCEDURE — 77387 GUIDANCE FOR RADJ TX DLVR: CPT | Performed by: RADIOLOGY

## 2019-11-03 PROCEDURE — 77336 RADIATION PHYSICS CONSULT: CPT | Performed by: RADIOLOGY

## 2019-11-04 PROCEDURE — 77412 RADIATION TX DELIVERY LVL 3: CPT | Performed by: RADIOLOGY

## 2019-11-04 PROCEDURE — 77387 GUIDANCE FOR RADJ TX DLVR: CPT | Performed by: RADIOLOGY

## 2019-11-05 PROCEDURE — 77412 RADIATION TX DELIVERY LVL 3: CPT | Performed by: RADIOLOGY

## 2019-11-05 PROCEDURE — 77387 GUIDANCE FOR RADJ TX DLVR: CPT | Performed by: RADIOLOGY

## 2019-11-06 ENCOUNTER — RADIATION ONCOLOGY WEEKLY ASSESSMENT (OUTPATIENT)
Dept: RADIATION ONCOLOGY | Facility: HOSPITAL | Age: 59
End: 2019-11-06

## 2019-11-06 VITALS
SYSTOLIC BLOOD PRESSURE: 145 MMHG | OXYGEN SATURATION: 100 % | BODY MASS INDEX: 26.46 KG/M2 | DIASTOLIC BLOOD PRESSURE: 91 MMHG | HEART RATE: 61 BPM | WEIGHT: 159 LBS

## 2019-11-06 DIAGNOSIS — C50.812 MALIGNANT NEOPLASM OF OVERLAPPING SITES OF LEFT BREAST IN FEMALE, ESTROGEN RECEPTOR POSITIVE (HCC): Primary | ICD-10-CM

## 2019-11-06 DIAGNOSIS — Z17.0 MALIGNANT NEOPLASM OF OVERLAPPING SITES OF LEFT BREAST IN FEMALE, ESTROGEN RECEPTOR POSITIVE (HCC): Primary | ICD-10-CM

## 2019-11-06 PROCEDURE — 77412 RADIATION TX DELIVERY LVL 3: CPT | Performed by: RADIOLOGY

## 2019-11-06 PROCEDURE — 77387 GUIDANCE FOR RADJ TX DLVR: CPT | Performed by: RADIOLOGY

## 2019-11-06 NOTE — PROGRESS NOTES
Physician Weekly Management Note    Diagnosis:     Diagnosis Plan   1. Malignant neoplasm of overlapping sites of left breast in female, estrogen receptor positive (CMS/HCC)         RT Details:  Treatment #5/25     Notes on Treatment course, Films, Medical progress:  No complaints or questions continue as planned.    Weekly Management:  Medication reviewed?   Yes  New medications given?   No  Problemlist reviewed?   Yes  Problem added?   No       Technical aspects reviewed:  Weekly OBI approved?   Yes  Weekly port films approved?   Yes  Change requests noted on port film?   No  Patient setup and plan reviewed?   Yes    Chart Reviewed:  Continue current treatment plan?   Yes  Treatment plan change requested?   No  CBC reviewed?   No  Concurrent Chemo?   No    Objective     Toxicities:   As above     Review of Systems   As above    There were no vitals filed for this visit.    Current Status 10/15/2019   ECOG score 0       Physical Exam  As above      Problem Summary List    Diagnosis:     Diagnosis Plan   1. Malignant neoplasm of overlapping sites of left breast in female, estrogen receptor positive (CMS/HCC)       Pathology:       Past Medical History:   Diagnosis Date   • Anemia in neoplastic disease    • Arthritis    • Breast cancer (CMS/HCC)     Left   • CVA (cerebral vascular accident) (CMS/HCC)    • Hip pain     RIGHT HIP... CYST   • History of fracture of leg 1987   • Skin sore     OPEN SORE LEFT BREAST   • Syncope    • Vertigo         Past Surgical History:   Procedure Laterality Date   • FRACTURE SURGERY  1987    Leg   • HARDWARE REMOVAL     • MASTECTOMY W/ SENTINEL NODE BIOPSY Bilateral 9/16/2019    Procedure: BILATERLA MODIFIED RADICAL MASTECTOMY WITH BILATERAL SENTINEL LYMPH NODE BIOPSY;  Surgeon: Joby Barron Jr., MD;  Location: [facility];  Service: General   • VENOUS ACCESS DEVICE (PORT) INSERTION Right 2/1/2019    Procedure: INSERTION VENOUS ACCESS DEVICE;  Surgeon: Joby Barron Jr., MD;   Location: [facility];  Service: General   • VENOUS ACCESS DEVICE (PORT) REMOVAL N/A 10/30/2019    Procedure: Mediport Removal;  Surgeon: Joby Barron Jr., MD;  Location: [facility] OR;  Service: General         Current Outpatient Medications on File Prior to Visit   Medication Sig Dispense Refill   • acetaminophen (TYLENOL) 500 MG tablet Take 500 mg by mouth Every 6 (Six) Hours As Needed for Mild Pain .     • Apoaequorin (PREVAGEN PO) Take 1 tablet by mouth Daily. Unsure of dosage     • aspirin 81 MG EC tablet Take 1 tablet by mouth Daily. (Patient taking differently: Take 81 mg by mouth Daily. Holding) 30 tablet 0   • DICLOFENAC PO Take 75 mg by mouth 2 (Two) Times a Day. Will hold two days prior to sx     • IBUPROFEN PO Take 200 mg by mouth Every 4 (Four) Hours As Needed.     • letrozole (FEMARA) 2.5 MG tablet Take 1 tablet by mouth Daily. 30 tablet 6   • meclizine (ANTIVERT) 25 MG tablet Take 1 tablet by mouth 2 (Two) Times a Day As Needed for dizziness. 28 tablet 0   • metoprolol succinate XL (TOPROL XL) 25 MG 24 hr tablet Take 1 tablet by mouth Daily. 30 tablet 6   • Multiple Vitamin (MULTI VITAMIN DAILY PO) Take 1 tablet by mouth Daily.     • olopatadine (PATANOL) 0.1 % ophthalmic solution Administer 1 drop to both eyes 2 (Two) Times a Day. (Patient taking differently: Administer 1 drop to both eyes 2 (Two) Times a Day As Needed.) 5 mL 5   • ondansetron (ZOFRAN) 4 MG tablet Take 1 tablet by mouth Every 6 (Six) Hours As Needed for Nausea or Vomiting for up to 20 doses. 20 tablet 0   • oxyCODONE-acetaminophen (PERCOCET) 5-325 MG per tablet 1 q 6 hrs prn pain 10 tablet 0     No current facility-administered medications on file prior to visit.        Allergies   Allergen Reactions   • Erythromycin GI Intolerance   • Levaquin [Levofloxacin] GI Intolerance   • Penicillins GI Intolerance         Primary care MD:    Provider, No Known    Oncologist: JOLENE Dixon MD    Seen and approved by:  Pedro Marmolejo,  MD  11/06/2019

## 2019-11-07 PROCEDURE — 77412 RADIATION TX DELIVERY LVL 3: CPT | Performed by: RADIOLOGY

## 2019-11-07 PROCEDURE — 77427 RADIATION TX MANAGEMENT X5: CPT | Performed by: RADIOLOGY

## 2019-11-07 PROCEDURE — 77387 GUIDANCE FOR RADJ TX DLVR: CPT | Performed by: RADIOLOGY

## 2019-11-08 PROCEDURE — 77387 GUIDANCE FOR RADJ TX DLVR: CPT | Performed by: RADIOLOGY

## 2019-11-08 PROCEDURE — 77412 RADIATION TX DELIVERY LVL 3: CPT | Performed by: RADIOLOGY

## 2019-11-10 PROBLEM — C50.811 MALIGNANT NEOPLASM OF OVERLAPPING SITES OF BOTH BREASTS IN FEMALE, ESTROGEN RECEPTOR POSITIVE (HCC): Status: ACTIVE | Noted: 2019-11-10

## 2019-11-10 PROBLEM — C50.812 MALIGNANT NEOPLASM OF OVERLAPPING SITES OF BOTH BREASTS IN FEMALE, ESTROGEN RECEPTOR POSITIVE: Status: ACTIVE | Noted: 2019-11-10

## 2019-11-10 PROBLEM — Z17.0 MALIGNANT NEOPLASM OF OVERLAPPING SITES OF BOTH BREASTS IN FEMALE, ESTROGEN RECEPTOR POSITIVE (HCC): Status: ACTIVE | Noted: 2019-11-10

## 2019-11-10 PROCEDURE — 77336 RADIATION PHYSICS CONSULT: CPT | Performed by: RADIOLOGY

## 2019-11-11 PROCEDURE — 77412 RADIATION TX DELIVERY LVL 3: CPT | Performed by: RADIOLOGY

## 2019-11-11 PROCEDURE — 77387 GUIDANCE FOR RADJ TX DLVR: CPT | Performed by: RADIOLOGY

## 2019-11-12 PROCEDURE — 77387 GUIDANCE FOR RADJ TX DLVR: CPT | Performed by: RADIOLOGY

## 2019-11-12 PROCEDURE — 77412 RADIATION TX DELIVERY LVL 3: CPT | Performed by: RADIOLOGY

## 2019-11-12 PROCEDURE — 77300 RADIATION THERAPY DOSE PLAN: CPT | Performed by: RADIOLOGY

## 2019-11-12 PROCEDURE — 77334 RADIATION TREATMENT AID(S): CPT | Performed by: RADIOLOGY

## 2019-11-13 ENCOUNTER — RADIATION ONCOLOGY WEEKLY ASSESSMENT (OUTPATIENT)
Dept: RADIATION ONCOLOGY | Facility: HOSPITAL | Age: 59
End: 2019-11-13

## 2019-11-13 DIAGNOSIS — Z17.0 MALIGNANT NEOPLASM OF OVERLAPPING SITES OF BOTH BREASTS IN FEMALE, ESTROGEN RECEPTOR POSITIVE (HCC): Primary | ICD-10-CM

## 2019-11-13 DIAGNOSIS — C50.812 MALIGNANT NEOPLASM OF OVERLAPPING SITES OF BOTH BREASTS IN FEMALE, ESTROGEN RECEPTOR POSITIVE (HCC): Primary | ICD-10-CM

## 2019-11-13 DIAGNOSIS — C50.811 MALIGNANT NEOPLASM OF OVERLAPPING SITES OF BOTH BREASTS IN FEMALE, ESTROGEN RECEPTOR POSITIVE (HCC): Primary | ICD-10-CM

## 2019-11-13 PROCEDURE — 77280 THER RAD SIMULAJ FIELD SMPL: CPT | Performed by: RADIOLOGY

## 2019-11-13 PROCEDURE — 77412 RADIATION TX DELIVERY LVL 3: CPT | Performed by: RADIOLOGY

## 2019-11-13 NOTE — PROGRESS NOTES
Physician Weekly Management Note    Diagnosis:     Diagnosis Plan   1. Malignant neoplasm of overlapping sites of both breasts in female, estrogen receptor positive (CMS/HCC)         RT Details:  Treatment #10/30    Notes on Treatment course, Films, Medical progress:  Continues to do well, very slight erythema in the treatment portal.  Starting today we will be adding the right chest wall as well.    Weekly Management:  Medication reviewed?   Yes  New medications given?   No  Problemlist reviewed?   Yes  Problem added?   No       Technical aspects reviewed:  Weekly OBI approved?   Yes  Weekly port films approved?   Yes  Change requests noted on port film?   No  Patient setup and plan reviewed?   Yes    Chart Reviewed:  Continue current treatment plan?   Yes  Treatment plan change requested?   No  CBC reviewed?   No  Concurrent Chemo?   No    Objective     Toxicities:   As above      Review of Systems   As above    There were no vitals filed for this visit.    Current Status 10/15/2019   ECOG score 0       Physical Exam  As above      Problem Summary List    Diagnosis:     Diagnosis Plan   1. Malignant neoplasm of overlapping sites of both breasts in female, estrogen receptor positive (CMS/HCC)       Pathology:       Past Medical History:   Diagnosis Date   • Anemia in neoplastic disease    • Arthritis    • Breast cancer (CMS/HCC)     Left   • CVA (cerebral vascular accident) (CMS/HCC)    • Hip pain     RIGHT HIP... CYST   • History of fracture of leg 1987   • Skin sore     OPEN SORE LEFT BREAST   • Syncope    • Vertigo          Past Surgical History:   Procedure Laterality Date   • FRACTURE SURGERY  1987    Leg   • HARDWARE REMOVAL     • MASTECTOMY W/ SENTINEL NODE BIOPSY Bilateral 9/16/2019    Procedure: BILATERLA MODIFIED RADICAL MASTECTOMY WITH BILATERAL SENTINEL LYMPH NODE BIOPSY;  Surgeon: Joby Barron Jr., MD;  [facility];  Service: General   • VENOUS ACCESS DEVICE (PORT) INSERTION Right  2/1/2019    Procedure: INSERTION VENOUS ACCESS DEVICE;  Surgeon: Joby Barron Jr., MD;  Location: [facility] OR [facility];  Service: General   • VENOUS ACCESS DEVICE (PORT) REMOVAL N/A 10/30/2019    Procedure: Mediport Removal;  Surgeon: Joby Barron Jr., MD;  Location: [facility] MAIN OR;  Service: General         Current Outpatient Medications on File Prior to Visit   Medication Sig Dispense Refill   • acetaminophen (TYLENOL) 500 MG tablet Take 500 mg by mouth Every 6 (Six) Hours As Needed for Mild Pain .     • Apoaequorin (PREVAGEN PO) Take 1 tablet by mouth Daily. Unsure of dosage     • aspirin 81 MG EC tablet Take 1 tablet by mouth Daily. (Patient taking differently: Take 81 mg by mouth Daily. Holding) 30 tablet 0   • DICLOFENAC PO Take 75 mg by mouth 2 (Two) Times a Day. Will hold two days prior to sx     • IBUPROFEN PO Take 200 mg by mouth Every 4 (Four) Hours As Needed.     • letrozole (FEMARA) 2.5 MG tablet Take 1 tablet by mouth Daily. 30 tablet 6   • meclizine (ANTIVERT) 25 MG tablet Take 1 tablet by mouth 2 (Two) Times a Day As Needed for dizziness. 28 tablet 0   • metoprolol succinate XL (TOPROL XL) 25 MG 24 hr tablet Take 1 tablet by mouth Daily. 30 tablet 6   • Multiple Vitamin (MULTI VITAMIN DAILY PO) Take 1 tablet by mouth Daily.     • olopatadine (PATANOL) 0.1 % ophthalmic solution Administer 1 drop to both eyes 2 (Two) Times a Day. (Patient taking differently: Administer 1 drop to both eyes 2 (Two) Times a Day As Needed.) 5 mL 5   • ondansetron (ZOFRAN) 4 MG tablet Take 1 tablet by mouth Every 6 (Six) Hours As Needed for Nausea or Vomiting for up to 20 doses. 20 tablet 0   • oxyCODONE-acetaminophen (PERCOCET) 5-325 MG per tablet 1 q 6 hrs prn pain 10 tablet 0     No current facility-administered medications on file prior to visit.        Allergies   Allergen Reactions   • Erythromycin GI Intolerance   • Levaquin [Levofloxacin] GI Intolerance   • Penicillins GI Intolerance        Primary care MD:     Provider, No Known    Oncologist:   JOLENE Dixon MD    Seen and approved by:  Pedro Marmolejo MD  11/13/2019

## 2019-11-14 PROCEDURE — 77412 RADIATION TX DELIVERY LVL 3: CPT | Performed by: RADIOLOGY

## 2019-11-14 PROCEDURE — 77427 RADIATION TX MANAGEMENT X5: CPT | Performed by: RADIOLOGY

## 2019-11-14 PROCEDURE — 77387 GUIDANCE FOR RADJ TX DLVR: CPT | Performed by: RADIOLOGY

## 2019-11-15 PROCEDURE — 77412 RADIATION TX DELIVERY LVL 3: CPT | Performed by: RADIOLOGY

## 2019-11-15 PROCEDURE — 77387 GUIDANCE FOR RADJ TX DLVR: CPT | Performed by: RADIOLOGY

## 2019-11-17 PROCEDURE — 77336 RADIATION PHYSICS CONSULT: CPT | Performed by: RADIOLOGY

## 2019-11-18 PROCEDURE — 77412 RADIATION TX DELIVERY LVL 3: CPT | Performed by: RADIOLOGY

## 2019-11-18 PROCEDURE — 77387 GUIDANCE FOR RADJ TX DLVR: CPT | Performed by: RADIOLOGY

## 2019-11-19 PROCEDURE — 77412 RADIATION TX DELIVERY LVL 3: CPT | Performed by: RADIOLOGY

## 2019-11-19 PROCEDURE — 77387 GUIDANCE FOR RADJ TX DLVR: CPT | Performed by: RADIOLOGY

## 2019-11-20 ENCOUNTER — RADIATION ONCOLOGY WEEKLY ASSESSMENT (OUTPATIENT)
Dept: RADIATION ONCOLOGY | Facility: HOSPITAL | Age: 59
End: 2019-11-20

## 2019-11-20 VITALS
BODY MASS INDEX: 26.46 KG/M2 | OXYGEN SATURATION: 97 % | DIASTOLIC BLOOD PRESSURE: 94 MMHG | HEART RATE: 61 BPM | WEIGHT: 159 LBS | SYSTOLIC BLOOD PRESSURE: 152 MMHG

## 2019-11-20 DIAGNOSIS — Z17.0 MALIGNANT NEOPLASM OF OVERLAPPING SITES OF BOTH BREASTS IN FEMALE, ESTROGEN RECEPTOR POSITIVE (HCC): Primary | ICD-10-CM

## 2019-11-20 DIAGNOSIS — C50.812 MALIGNANT NEOPLASM OF OVERLAPPING SITES OF BOTH BREASTS IN FEMALE, ESTROGEN RECEPTOR POSITIVE (HCC): Primary | ICD-10-CM

## 2019-11-20 DIAGNOSIS — C50.811 MALIGNANT NEOPLASM OF OVERLAPPING SITES OF BOTH BREASTS IN FEMALE, ESTROGEN RECEPTOR POSITIVE (HCC): Primary | ICD-10-CM

## 2019-11-20 PROCEDURE — 77412 RADIATION TX DELIVERY LVL 3: CPT | Performed by: RADIOLOGY

## 2019-11-20 PROCEDURE — 77387 GUIDANCE FOR RADJ TX DLVR: CPT | Performed by: RADIOLOGY

## 2019-11-20 NOTE — PROGRESS NOTES
Physician Weekly Management Note    Diagnosis:     Diagnosis Plan   1. Malignant neoplasm of overlapping sites of both breasts in female, estrogen receptor positive (CMS/HCC)         RT Details:  Treatment #15    Notes on Treatment course, Films, Medical progress:   minor erythema in the treatment portal, no skin injury, incisions are well-healed.  Energy level is intact.  Continue as planned.    Weekly Management:  Medication reviewed?   Yes  New medications given?   No  Problemlist reviewed?   Yes  Problem added?   No       Technical aspects reviewed:  Weekly OBI approved?   Yes  Weekly port films approved?   Yes  Change requests noted on port film?   No  Patient setup and plan reviewed?   Yes    Chart Reviewed:  Continue current treatment plan?   Yes  Treatment plan change requested?   No  CBC reviewed?   No  Concurrent Chemo?   No    Objective     Toxicities:   As above     Review of Systems   As above    There were no vitals filed for this visit.    Current Status 10/15/2019   ECOG score 0       Physical Exam  As above      Problem Summary List    Diagnosis:     Diagnosis Plan   1. Malignant neoplasm of overlapping sites of both breasts in female, estrogen receptor positive (CMS/HCC)       Pathology:       Past Medical History:   Diagnosis Date   • Anemia in neoplastic disease    • Arthritis    • Breast cancer (CMS/HCC)     Left   • CVA (cerebral vascular accident) (CMS/HCC)    • Hip pain     RIGHT HIP... CYST   • History of fracture of leg 1987   • Skin sore     OPEN SORE LEFT BREAST   • Syncope    • Vertigo          Past Surgical History:   Procedure Laterality Date   • FRACTURE SURGERY  1987    Leg   • HARDWARE REMOVAL     • MASTECTOMY W/ SENTINEL NODE BIOPSY Bilateral 9/16/2019    Procedure: BILATERLA MODIFIED RADICAL MASTECTOMY WITH BILATERAL SENTINEL LYMPH NODE BIOPSY;  Surgeon: Joby Barron Jr., MD;  Location: [facility];  Service: General   • VENOUS ACCESS DEVICE (PORT) INSERTION Right 2/1/2019     Procedure: INSERTION VENOUS ACCESS DEVICE;  Surgeon: Joby Barron Jr., MD;  Location: [facility] OR [facility];  Service: General   • VENOUS ACCESS DEVICE (PORT) REMOVAL N/A 10/30/2019    Procedure: Mediport Removal;  Surgeon: Joby Barron Jr., MD;  Location: [facility] OR;  Service: General         Current Outpatient Medications on File Prior to Visit   Medication Sig Dispense Refill   • acetaminophen (TYLENOL) 500 MG tablet Take 500 mg by mouth Every 6 (Six) Hours As Needed for Mild Pain .     • Apoaequorin (PREVAGEN PO) Take 1 tablet by mouth Daily. Unsure of dosage     • aspirin 81 MG EC tablet Take 1 tablet by mouth Daily. (Patient taking differently: Take 81 mg by mouth Daily. Holding) 30 tablet 0   • DICLOFENAC PO Take 75 mg by mouth 2 (Two) Times a Day. Will hold two days prior to sx     • IBUPROFEN PO Take 200 mg by mouth Every 4 (Four) Hours As Needed.     • letrozole (FEMARA) 2.5 MG tablet Take 1 tablet by mouth Daily. 30 tablet 6   • meclizine (ANTIVERT) 25 MG tablet Take 1 tablet by mouth 2 (Two) Times a Day As Needed for dizziness. 28 tablet 0   • metoprolol succinate XL (TOPROL XL) 25 MG 24 hr tablet Take 1 tablet by mouth Daily. 30 tablet 6   • Multiple Vitamin (MULTI VITAMIN DAILY PO) Take 1 tablet by mouth Daily.     • olopatadine (PATANOL) 0.1 % ophthalmic solution Administer 1 drop to both eyes 2 (Two) Times a Day. (Patient taking differently: Administer 1 drop to both eyes 2 (Two) Times a Day As Needed.) 5 mL 5   • ondansetron (ZOFRAN) 4 MG tablet Take 1 tablet by mouth Every 6 (Six) Hours As Needed for Nausea or Vomiting for up to 20 doses. 20 tablet 0   • oxyCODONE-acetaminophen (PERCOCET) 5-325 MG per tablet 1 q 6 hrs prn pain 10 tablet 0     No current facility-administered medications on file prior to visit.        Allergies   Allergen Reactions   • Erythromycin GI Intolerance   • Levaquin [Levofloxacin] GI Intolerance   • Penicillins GI Intolerance        Primary care MD:    Provider, No  Known    Oncologist:   JOLENE Dixon MD    Seen and approved by:  Pedro Marmolejo MD  11/20/2019

## 2019-11-21 PROCEDURE — 77412 RADIATION TX DELIVERY LVL 3: CPT | Performed by: RADIOLOGY

## 2019-11-21 PROCEDURE — 77387 GUIDANCE FOR RADJ TX DLVR: CPT | Performed by: RADIOLOGY

## 2019-11-21 PROCEDURE — 77427 RADIATION TX MANAGEMENT X5: CPT | Performed by: RADIOLOGY

## 2019-11-22 PROCEDURE — 77387 GUIDANCE FOR RADJ TX DLVR: CPT | Performed by: RADIOLOGY

## 2019-11-22 PROCEDURE — 77412 RADIATION TX DELIVERY LVL 3: CPT | Performed by: RADIOLOGY

## 2019-11-22 PROCEDURE — 77334 RADIATION TREATMENT AID(S): CPT | Performed by: RADIOLOGY

## 2019-11-22 PROCEDURE — 77300 RADIATION THERAPY DOSE PLAN: CPT | Performed by: RADIOLOGY

## 2019-11-24 PROCEDURE — 77336 RADIATION PHYSICS CONSULT: CPT | Performed by: RADIOLOGY

## 2019-11-25 PROCEDURE — 77412 RADIATION TX DELIVERY LVL 3: CPT | Performed by: RADIOLOGY

## 2019-11-25 PROCEDURE — 77387 GUIDANCE FOR RADJ TX DLVR: CPT | Performed by: RADIOLOGY

## 2019-11-26 PROCEDURE — 77387 GUIDANCE FOR RADJ TX DLVR: CPT | Performed by: RADIOLOGY

## 2019-11-26 PROCEDURE — 77412 RADIATION TX DELIVERY LVL 3: CPT | Performed by: RADIOLOGY

## 2019-11-27 ENCOUNTER — RADIATION ONCOLOGY WEEKLY ASSESSMENT (OUTPATIENT)
Dept: RADIATION ONCOLOGY | Facility: HOSPITAL | Age: 59
End: 2019-11-27

## 2019-11-27 DIAGNOSIS — C50.811 MALIGNANT NEOPLASM OF OVERLAPPING SITES OF BOTH BREASTS IN FEMALE, ESTROGEN RECEPTOR POSITIVE (HCC): Primary | ICD-10-CM

## 2019-11-27 DIAGNOSIS — Z17.0 MALIGNANT NEOPLASM OF OVERLAPPING SITES OF BOTH BREASTS IN FEMALE, ESTROGEN RECEPTOR POSITIVE (HCC): Primary | ICD-10-CM

## 2019-11-27 DIAGNOSIS — C50.812 MALIGNANT NEOPLASM OF OVERLAPPING SITES OF BOTH BREASTS IN FEMALE, ESTROGEN RECEPTOR POSITIVE (HCC): Primary | ICD-10-CM

## 2019-11-27 PROCEDURE — 77387 GUIDANCE FOR RADJ TX DLVR: CPT | Performed by: RADIOLOGY

## 2019-11-27 PROCEDURE — 77412 RADIATION TX DELIVERY LVL 3: CPT | Performed by: RADIOLOGY

## 2019-11-27 NOTE — PROGRESS NOTES
Physician Weekly Management Note    Diagnosis:     Diagnosis Plan   1. Malignant neoplasm of overlapping sites of both breasts in female, estrogen receptor positive (CMS/HCC)         RT Details:  Treatment #20/34     Notes on Treatment course, Films, Medical progress:  Developing some wet desquamation on the left, prescribe Silvadene and we will let that side and rest for approximately 6 treatment days.    Weekly Management:  Medication reviewed?   Yes  New medications given?   No  Problemlist reviewed?   Yes  Problem added?   No       Technical aspects reviewed:  Weekly OBI approved?   Yes  Weekly port films approved?   Yes  Change requests noted on port film?   No  Patient setup and plan reviewed?   Yes    Chart Reviewed:  Continue current treatment plan?   Yes  Treatment plan change requested?   Yes  CBC reviewed?   No  Concurrent Chemo?   No    Objective     Toxicities:   As above     Review of Systems   As above    There were no vitals filed for this visit.    Current Status 10/15/2019   ECOG score 0       Physical Exam  As above      Problem Summary List    Diagnosis:     Diagnosis Plan   1. Malignant neoplasm of overlapping sites of both breasts in female, estrogen receptor positive (CMS/HCC)       Pathology:       Past Medical History:   Diagnosis Date   • Anemia in neoplastic disease    • Arthritis    • Breast cancer (CMS/HCC)     Left   • CVA (cerebral vascular accident) (CMS/HCC)    • Hip pain     RIGHT HIP... CYST   • History of fracture of leg 1987   • Skin sore     OPEN SORE LEFT BREAST   • Syncope    • Vertigo          Past Surgical History:   Procedure Laterality Date   • FRACTURE SURGERY  1987    Leg   • HARDWARE REMOVAL     • MASTECTOMY W/ SENTINEL NODE BIOPSY Bilateral 9/16/2019    Procedure: BILATERLA MODIFIED RADICAL MASTECTOMY WITH BILATERAL SENTINEL LYMPH NODE BIOPSY;  Surgeon: Joby Barron Jr., MD;  Location: [facility];  Service: General   • VENOUS ACCESS DEVICE (PORT) INSERTION Right  2/1/2019    Procedure: INSERTION VENOUS ACCESS DEVICE;  Surgeon: Joby Barron Jr., MD;  Location: [facility];  Service: General   • VENOUS ACCESS DEVICE (PORT) REMOVAL N/A 10/30/2019    Procedure: Mediport Removal;  Surgeon: Joby Barron Jr., MD;  Location: [facility] MAIN OR;  Service: General         Current Outpatient Medications on File Prior to Visit   Medication Sig Dispense Refill   • acetaminophen (TYLENOL) 500 MG tablet Take 500 mg by mouth Every 6 (Six) Hours As Needed for Mild Pain .     • Apoaequorin (PREVAGEN PO) Take 1 tablet by mouth Daily. Unsure of dosage     • aspirin 81 MG EC tablet Take 1 tablet by mouth Daily. (Patient taking differently: Take 81 mg by mouth Daily. Holding) 30 tablet 0   • DICLOFENAC PO Take 75 mg by mouth 2 (Two) Times a Day. Will hold two days prior to sx     • IBUPROFEN PO Take 200 mg by mouth Every 4 (Four) Hours As Needed.     • letrozole (FEMARA) 2.5 MG tablet Take 1 tablet by mouth Daily. 30 tablet 6   • meclizine (ANTIVERT) 25 MG tablet Take 1 tablet by mouth 2 (Two) Times a Day As Needed for dizziness. 28 tablet 0   • metoprolol succinate XL (TOPROL XL) 25 MG 24 hr tablet Take 1 tablet by mouth Daily. 30 tablet 6   • Multiple Vitamin (MULTI VITAMIN DAILY PO) Take 1 tablet by mouth Daily.     • olopatadine (PATANOL) 0.1 % ophthalmic solution Administer 1 drop to both eyes 2 (Two) Times a Day. (Patient taking differently: Administer 1 drop to both eyes 2 (Two) Times a Day As Needed.) 5 mL 5   • ondansetron (ZOFRAN) 4 MG tablet Take 1 tablet by mouth Every 6 (Six) Hours As Needed for Nausea or Vomiting for up to 20 doses. 20 tablet 0   • oxyCODONE-acetaminophen (PERCOCET) 5-325 MG per tablet 1 q 6 hrs prn pain 10 tablet 0     No current facility-administered medications on file prior to visit.        Allergies   Allergen Reactions   • Erythromycin GI Intolerance   • Levaquin [Levofloxacin] GI Intolerance   • Penicillins GI Intolerance        Primary care MD:     Provider, No Known    Oncologist:  No primary care provider on file.      Seen and approved by:  Pedro Marmolejo MD  11/27/2019

## 2019-12-01 ENCOUNTER — APPOINTMENT (OUTPATIENT)
Dept: RADIATION ONCOLOGY | Facility: HOSPITAL | Age: 59
End: 2019-12-01

## 2019-12-01 PROCEDURE — 77336 RADIATION PHYSICS CONSULT: CPT | Performed by: RADIOLOGY

## 2019-12-02 ENCOUNTER — APPOINTMENT (OUTPATIENT)
Dept: RADIATION ONCOLOGY | Facility: HOSPITAL | Age: 59
End: 2019-12-02

## 2019-12-09 ENCOUNTER — RADIATION ONCOLOGY WEEKLY ASSESSMENT (OUTPATIENT)
Dept: RADIATION ONCOLOGY | Facility: HOSPITAL | Age: 59
End: 2019-12-09

## 2019-12-09 DIAGNOSIS — Z17.0 MALIGNANT NEOPLASM OF OVERLAPPING SITES OF BOTH BREASTS IN FEMALE, ESTROGEN RECEPTOR POSITIVE (HCC): Primary | ICD-10-CM

## 2019-12-09 DIAGNOSIS — C50.812 MALIGNANT NEOPLASM OF OVERLAPPING SITES OF BOTH BREASTS IN FEMALE, ESTROGEN RECEPTOR POSITIVE (HCC): Primary | ICD-10-CM

## 2019-12-09 DIAGNOSIS — C50.811 MALIGNANT NEOPLASM OF OVERLAPPING SITES OF BOTH BREASTS IN FEMALE, ESTROGEN RECEPTOR POSITIVE (HCC): Primary | ICD-10-CM

## 2019-12-09 PROCEDURE — 77427 RADIATION TX MANAGEMENT X5: CPT | Performed by: RADIOLOGY

## 2019-12-09 PROCEDURE — 77387 GUIDANCE FOR RADJ TX DLVR: CPT | Performed by: RADIOLOGY

## 2019-12-09 PROCEDURE — 77412 RADIATION TX DELIVERY LVL 3: CPT | Performed by: RADIOLOGY

## 2019-12-09 NOTE — PROGRESS NOTES
Physician Weekly Management Note    Diagnosis:     Diagnosis Plan   1. Malignant neoplasm of overlapping sites of both breasts in female, estrogen receptor positive (CMS/HCC)         RT Details:  Treatment #25    Notes on Treatment course, Films, Medical progress:  Returns after treatment break for significant wet desquamation to the large area of the left chest wall,.  Treatment portal is now dry with erythema and peeling will resume today.    Weekly Management:  Medication reviewed?   Yes  New medications given?   No  Problemlist reviewed?   Yes  Problem added?   No       Technical aspects reviewed:  Weekly OBI approved?   Yes  Weekly port films approved?   Yes  Change requests noted on port film?   No  Patient setup and plan reviewed?   Yes    Chart Reviewed:  Continue current treatment plan?   Yes  Treatment plan change requested?   No  CBC reviewed?   No  Concurrent Chemo?   No    Objective     Toxicities:   As above     Review of Systems   As above      There were no vitals filed for this visit.    Current Status 10/15/2019   ECOG score 0       Physical Exam  As above      Problem Summary List    Diagnosis:     Diagnosis Plan   1. Malignant neoplasm of overlapping sites of both breasts in female, estrogen receptor positive (CMS/HCC)       Pathology:       Past Medical History:   Diagnosis Date   • Anemia in neoplastic disease    • Arthritis    • Breast cancer (CMS/HCC)     Left   • CVA (cerebral vascular accident) (CMS/HCC)    • Hip pain     RIGHT HIP... CYST   • History of fracture of leg 1987   • Skin sore     OPEN SORE LEFT BREAST   • Syncope    • Vertigo          Past Surgical History:   Procedure Laterality Date   • FRACTURE SURGERY  1987    Leg   • HARDWARE REMOVAL     • MASTECTOMY W/ SENTINEL NODE BIOPSY Bilateral 9/16/2019    Procedure: BILATERLA MODIFIED RADICAL MASTECTOMY WITH BILATERAL SENTINEL LYMPH NODE BIOPSY;  Surgeon: Joby Barron Jr., MD;  Location: [facility];  Service: General   •  VENOUS ACCESS DEVICE (PORT) INSERTION Right 2/1/2019    Procedure: INSERTION VENOUS ACCESS DEVICE;  Surgeon: Joby Barron Jr., MD;  Location: [facility] OR [facility];  Service: General   • VENOUS ACCESS DEVICE (PORT) REMOVAL N/A 10/30/2019    Procedure: Mediport Removal;  Surgeon: Joby Barron Jr., MD;  Location: [facility] MAIN OR;  Service: General         Current Outpatient Medications on File Prior to Visit   Medication Sig Dispense Refill   • acetaminophen (TYLENOL) 500 MG tablet Take 500 mg by mouth Every 6 (Six) Hours As Needed for Mild Pain .     • Apoaequorin (PREVAGEN PO) Take 1 tablet by mouth Daily. Unsure of dosage     • aspirin 81 MG EC tablet Take 1 tablet by mouth Daily. (Patient taking differently: Take 81 mg by mouth Daily. Holding) 30 tablet 0   • DICLOFENAC PO Take 75 mg by mouth 2 (Two) Times a Day. Will hold two days prior to sx     • IBUPROFEN PO Take 200 mg by mouth Every 4 (Four) Hours As Needed.     • letrozole (FEMARA) 2.5 MG tablet Take 1 tablet by mouth Daily. 30 tablet 6   • meclizine (ANTIVERT) 25 MG tablet Take 1 tablet by mouth 2 (Two) Times a Day As Needed for dizziness. 28 tablet 0   • metoprolol succinate XL (TOPROL XL) 25 MG 24 hr tablet Take 1 tablet by mouth Daily. 30 tablet 6   • Multiple Vitamin (MULTI VITAMIN DAILY PO) Take 1 tablet by mouth Daily.     • olopatadine (PATANOL) 0.1 % ophthalmic solution Administer 1 drop to both eyes 2 (Two) Times a Day. (Patient taking differently: Administer 1 drop to both eyes 2 (Two) Times a Day As Needed.) 5 mL 5   • ondansetron (ZOFRAN) 4 MG tablet Take 1 tablet by mouth Every 6 (Six) Hours As Needed for Nausea or Vomiting for up to 20 doses. 20 tablet 0   • oxyCODONE-acetaminophen (PERCOCET) 5-325 MG per tablet 1 q 6 hrs prn pain 10 tablet 0   • silver sulfadiazine (SILVADENE, SSD) 1 % cream Apply  topically to the appropriate area as directed 2 (Two) Times a Day. 50 g 2     No current facility-administered medications on file prior to visit.         Allergies   Allergen Reactions   • Erythromycin GI Intolerance   • Levaquin [Levofloxacin] GI Intolerance   • Penicillins GI Intolerance        Primary care MD:    Provider, No Known    Oncologist:  No primary care provider on file.      Seen and approved by:  Pedro Marmolejo MD  12/09/2019

## 2019-12-10 PROCEDURE — 77412 RADIATION TX DELIVERY LVL 3: CPT | Performed by: RADIOLOGY

## 2019-12-10 PROCEDURE — 77387 GUIDANCE FOR RADJ TX DLVR: CPT | Performed by: RADIOLOGY

## 2019-12-11 PROCEDURE — 77387 GUIDANCE FOR RADJ TX DLVR: CPT | Performed by: RADIOLOGY

## 2019-12-11 PROCEDURE — 77412 RADIATION TX DELIVERY LVL 3: CPT | Performed by: RADIOLOGY

## 2019-12-12 PROCEDURE — 77412 RADIATION TX DELIVERY LVL 3: CPT | Performed by: RADIOLOGY

## 2019-12-12 PROCEDURE — 77387 GUIDANCE FOR RADJ TX DLVR: CPT | Performed by: RADIOLOGY

## 2019-12-13 ENCOUNTER — RADIATION ONCOLOGY WEEKLY ASSESSMENT (OUTPATIENT)
Dept: RADIATION ONCOLOGY | Facility: HOSPITAL | Age: 59
End: 2019-12-13

## 2019-12-13 VITALS
HEART RATE: 54 BPM | SYSTOLIC BLOOD PRESSURE: 154 MMHG | DIASTOLIC BLOOD PRESSURE: 81 MMHG | BODY MASS INDEX: 26.26 KG/M2 | WEIGHT: 157.8 LBS | OXYGEN SATURATION: 98 %

## 2019-12-13 DIAGNOSIS — Z17.0 MALIGNANT NEOPLASM OF OVERLAPPING SITES OF BOTH BREASTS IN FEMALE, ESTROGEN RECEPTOR POSITIVE (HCC): Primary | ICD-10-CM

## 2019-12-13 DIAGNOSIS — C50.812 MALIGNANT NEOPLASM OF OVERLAPPING SITES OF BOTH BREASTS IN FEMALE, ESTROGEN RECEPTOR POSITIVE (HCC): Primary | ICD-10-CM

## 2019-12-13 DIAGNOSIS — C50.811 MALIGNANT NEOPLASM OF OVERLAPPING SITES OF BOTH BREASTS IN FEMALE, ESTROGEN RECEPTOR POSITIVE (HCC): Primary | ICD-10-CM

## 2019-12-13 PROCEDURE — 77387 GUIDANCE FOR RADJ TX DLVR: CPT | Performed by: RADIOLOGY

## 2019-12-13 PROCEDURE — 77412 RADIATION TX DELIVERY LVL 3: CPT | Performed by: RADIOLOGY

## 2019-12-13 NOTE — PROGRESS NOTES
Physician Weekly Management Note    Diagnosis:     Diagnosis Plan   1. Malignant neoplasm of overlapping sites of both breasts in female, estrogen receptor positive (CMS/HCC)         RT Details: Treatment #24/34    Notes on Treatment course, Films, Medical progress:  Patient demonstrating good tolerance thus far, dry desquamation, skin erythema, no skin pain.  Incisions well-healed.  Reports fatigue.  Continue as planned.    Weekly Management:  Medication reviewed?   Yes  New medications given?   No  Problemlist reviewed?   Yes  Problem added?   No      Technical aspects reviewed:  Weekly OBI approved?   Yes  Weekly port films approved?   Yes  Change requests noted on port film?   No  Patient setup and plan reviewed?   Yes    Chart Reviewed:  Continue current treatment plan?   Yes  Treatment plan change requested?   No  CBC reviewed?   No  Concurrent Chemo?   No    Objective     Toxicities:   As above     Review of Systems   As above    There were no vitals filed for this visit.    Current Status 10/15/2019   ECOG score 0       Physical Exam  As above      Problem Summary List    Diagnosis:     Diagnosis Plan   1. Malignant neoplasm of overlapping sites of both breasts in female, estrogen receptor positive (CMS/HCC)       Pathology:       Past Medical History:   Diagnosis Date   • Anemia in neoplastic disease    • Arthritis    • Breast cancer (CMS/HCC)     Left   • CVA (cerebral vascular accident) (CMS/HCC)    • Hip pain     RIGHT HIP... CYST   • History of fracture of leg 1987   • Skin sore     OPEN SORE LEFT BREAST   • Syncope    • Vertigo          Past Surgical History:   Procedure Laterality Date   • FRACTURE SURGERY  1987    Leg   • HARDWARE REMOVAL     • MASTECTOMY W/ SENTINEL NODE BIOPSY Bilateral 9/16/2019    Procedure: BILATERLA MODIFIED RADICAL MASTECTOMY WITH BILATERAL SENTINEL LYMPH NODE BIOPSY;  Surgeon: Joby Barron Jr., MD;  Location: [facility];  Service: General   • VENOUS ACCESS DEVICE  (PORT) INSERTION Right 2/1/2019    Procedure: INSERTION VENOUS ACCESS DEVICE;  Surgeon: Joby Barron Jr., MD;  Location:  [facility];  Service: General   • VENOUS ACCESS DEVICE (PORT) REMOVAL N/A 10/30/2019    Procedure: Mediport Removal;  Surgeon: Joby Barron Jr., MD;  Location: [facility] MAIN OR;  Service: General         Current Outpatient Medications on File Prior to Visit   Medication Sig Dispense Refill   • acetaminophen (TYLENOL) 500 MG tablet Take 500 mg by mouth Every 6 (Six) Hours As Needed for Mild Pain .     • Apoaequorin (PREVAGEN PO) Take 1 tablet by mouth Daily. Unsure of dosage     • aspirin 81 MG EC tablet Take 1 tablet by mouth Daily. (Patient taking differently: Take 81 mg by mouth Daily. Holding) 30 tablet 0   • DICLOFENAC PO Take 75 mg by mouth 2 (Two) Times a Day. Will hold two days prior to sx     • IBUPROFEN PO Take 200 mg by mouth Every 4 (Four) Hours As Needed.     • letrozole (FEMARA) 2.5 MG tablet Take 1 tablet by mouth Daily. 30 tablet 6   • meclizine (ANTIVERT) 25 MG tablet Take 1 tablet by mouth 2 (Two) Times a Day As Needed for dizziness. 28 tablet 0   • metoprolol succinate XL (TOPROL XL) 25 MG 24 hr tablet Take 1 tablet by mouth Daily. 30 tablet 6   • Multiple Vitamin (MULTI VITAMIN DAILY PO) Take 1 tablet by mouth Daily.     • olopatadine (PATANOL) 0.1 % ophthalmic solution Administer 1 drop to both eyes 2 (Two) Times a Day. (Patient taking differently: Administer 1 drop to both eyes 2 (Two) Times a Day As Needed.) 5 mL 5   • ondansetron (ZOFRAN) 4 MG tablet Take 1 tablet by mouth Every 6 (Six) Hours As Needed for Nausea or Vomiting for up to 20 doses. 20 tablet 0   • oxyCODONE-acetaminophen (PERCOCET) 5-325 MG per tablet 1 q 6 hrs prn pain 10 tablet 0   • silver sulfadiazine (SILVADENE, SSD) 1 % cream Apply  topically to the appropriate area as directed 2 (Two) Times a Day. 50 g 2     No current facility-administered medications on file prior to visit.        Allergies    Allergen Reactions   • Erythromycin GI Intolerance   • Levaquin [Levofloxacin] GI Intolerance   • Penicillins GI Intolerance        Primary care MD:    Provider, No Known    Oncologist:  JOLENE Dixon MD    Seen and approved by:  Pedro Marmolejo MD  12/13/2019

## 2019-12-16 PROCEDURE — 77280 THER RAD SIMULAJ FIELD SMPL: CPT | Performed by: RADIOLOGY

## 2019-12-16 PROCEDURE — 77427 RADIATION TX MANAGEMENT X5: CPT | Performed by: RADIOLOGY

## 2019-12-16 PROCEDURE — 77412 RADIATION TX DELIVERY LVL 3: CPT | Performed by: RADIOLOGY

## 2019-12-16 PROCEDURE — 77336 RADIATION PHYSICS CONSULT: CPT | Performed by: RADIOLOGY

## 2019-12-17 ENCOUNTER — INFUSION (OUTPATIENT)
Dept: ONCOLOGY | Facility: HOSPITAL | Age: 59
End: 2019-12-17

## 2019-12-17 ENCOUNTER — OFFICE VISIT (OUTPATIENT)
Dept: ONCOLOGY | Facility: CLINIC | Age: 59
End: 2019-12-17

## 2019-12-17 VITALS
WEIGHT: 159.3 LBS | HEIGHT: 64 IN | SYSTOLIC BLOOD PRESSURE: 142 MMHG | BODY MASS INDEX: 27.2 KG/M2 | HEART RATE: 65 BPM | DIASTOLIC BLOOD PRESSURE: 90 MMHG | OXYGEN SATURATION: 96 % | TEMPERATURE: 98.1 F | RESPIRATION RATE: 16 BRPM

## 2019-12-17 DIAGNOSIS — Z17.0 MALIGNANT NEOPLASM OF OVERLAPPING SITES OF LEFT BREAST IN FEMALE, ESTROGEN RECEPTOR POSITIVE (HCC): Primary | ICD-10-CM

## 2019-12-17 DIAGNOSIS — D75.89 MACROCYTOSIS: ICD-10-CM

## 2019-12-17 DIAGNOSIS — C50.812 MALIGNANT NEOPLASM OF OVERLAPPING SITES OF LEFT BREAST IN FEMALE, ESTROGEN RECEPTOR POSITIVE (HCC): ICD-10-CM

## 2019-12-17 DIAGNOSIS — C50.812 MALIGNANT NEOPLASM OF OVERLAPPING SITES OF LEFT BREAST IN FEMALE, ESTROGEN RECEPTOR POSITIVE (HCC): Primary | ICD-10-CM

## 2019-12-17 DIAGNOSIS — Z17.0 MALIGNANT NEOPLASM OF OVERLAPPING SITES OF LEFT BREAST IN FEMALE, ESTROGEN RECEPTOR POSITIVE (HCC): ICD-10-CM

## 2019-12-17 DIAGNOSIS — R55 SYNCOPE, UNSPECIFIED SYNCOPE TYPE: ICD-10-CM

## 2019-12-17 DIAGNOSIS — R22.31 AXILLARY MASS, RIGHT: ICD-10-CM

## 2019-12-17 DIAGNOSIS — D63.0 ANEMIA IN NEOPLASTIC DISEASE: ICD-10-CM

## 2019-12-17 DIAGNOSIS — I10 ESSENTIAL HYPERTENSION: ICD-10-CM

## 2019-12-17 PROBLEM — L58.0 ACUTE RADIATION DERMATITIS: Status: ACTIVE | Noted: 2019-12-17

## 2019-12-17 LAB
ALBUMIN SERPL-MCNC: 4.4 G/DL (ref 3.5–5.2)
ALBUMIN/GLOB SERPL: 2 G/DL (ref 1.1–2.4)
ALP SERPL-CCNC: 89 U/L (ref 38–116)
ALT SERPL W P-5'-P-CCNC: 18 U/L (ref 0–33)
ANION GAP SERPL CALCULATED.3IONS-SCNC: 12.2 MMOL/L (ref 5–15)
AST SERPL-CCNC: 21 U/L (ref 0–32)
BASOPHILS # BLD AUTO: 0.04 10*3/MM3 (ref 0–0.2)
BASOPHILS NFR BLD AUTO: 1 % (ref 0–1.5)
BILIRUB SERPL-MCNC: 0.3 MG/DL (ref 0.2–1.2)
BUN BLD-MCNC: 20 MG/DL (ref 6–20)
BUN/CREAT SERPL: 21.7 (ref 7.3–30)
CALCIUM SPEC-SCNC: 9.7 MG/DL (ref 8.5–10.2)
CANCER AG15-3 SERPL-ACNC: 12.8 U/ML
CHLORIDE SERPL-SCNC: 103 MMOL/L (ref 98–107)
CO2 SERPL-SCNC: 25.8 MMOL/L (ref 22–29)
CREAT BLD-MCNC: 0.92 MG/DL (ref 0.6–1.1)
DEPRECATED RDW RBC AUTO: 50.6 FL (ref 37–54)
EOSINOPHIL # BLD AUTO: 0.13 10*3/MM3 (ref 0–0.4)
EOSINOPHIL NFR BLD AUTO: 3.4 % (ref 0.3–6.2)
ERYTHROCYTE [DISTWIDTH] IN BLOOD BY AUTOMATED COUNT: 14.4 % (ref 12.3–15.4)
GFR SERPL CREATININE-BSD FRML MDRD: 62 ML/MIN/1.73
GLOBULIN UR ELPH-MCNC: 2.2 GM/DL (ref 1.8–3.5)
GLUCOSE BLD-MCNC: 97 MG/DL (ref 74–124)
HCT VFR BLD AUTO: 39 % (ref 34–46.6)
HGB BLD-MCNC: 12.3 G/DL (ref 12–15.9)
IMM GRANULOCYTES # BLD AUTO: 0.01 10*3/MM3 (ref 0–0.05)
IMM GRANULOCYTES NFR BLD AUTO: 0.3 % (ref 0–0.5)
LYMPHOCYTES # BLD AUTO: 0.51 10*3/MM3 (ref 0.7–3.1)
LYMPHOCYTES NFR BLD AUTO: 13.2 % (ref 19.6–45.3)
MCH RBC QN AUTO: 30.2 PG (ref 26.6–33)
MCHC RBC AUTO-ENTMCNC: 31.5 G/DL (ref 31.5–35.7)
MCV RBC AUTO: 95.8 FL (ref 79–97)
MONOCYTES # BLD AUTO: 0.38 10*3/MM3 (ref 0.1–0.9)
MONOCYTES NFR BLD AUTO: 9.9 % (ref 5–12)
NEUTROPHILS # BLD AUTO: 2.78 10*3/MM3 (ref 1.7–7)
NEUTROPHILS NFR BLD AUTO: 72.2 % (ref 42.7–76)
NRBC BLD AUTO-RTO: 0 /100 WBC (ref 0–0.2)
PLATELET # BLD AUTO: 184 10*3/MM3 (ref 140–450)
PMV BLD AUTO: 8.1 FL (ref 6–12)
POTASSIUM BLD-SCNC: 4.3 MMOL/L (ref 3.5–4.7)
PROT SERPL-MCNC: 6.6 G/DL (ref 6.3–8)
RBC # BLD AUTO: 4.07 10*6/MM3 (ref 3.77–5.28)
SODIUM BLD-SCNC: 141 MMOL/L (ref 134–145)
WBC NRBC COR # BLD: 3.85 10*3/MM3 (ref 3.4–10.8)

## 2019-12-17 PROCEDURE — 77387 GUIDANCE FOR RADJ TX DLVR: CPT | Performed by: RADIOLOGY

## 2019-12-17 PROCEDURE — 77280 THER RAD SIMULAJ FIELD SMPL: CPT | Performed by: RADIOLOGY

## 2019-12-17 PROCEDURE — 86300 IMMUNOASSAY TUMOR CA 15-3: CPT | Performed by: INTERNAL MEDICINE

## 2019-12-17 PROCEDURE — 80053 COMPREHEN METABOLIC PANEL: CPT

## 2019-12-17 PROCEDURE — 77412 RADIATION TX DELIVERY LVL 3: CPT | Performed by: RADIOLOGY

## 2019-12-17 PROCEDURE — 85025 COMPLETE CBC W/AUTO DIFF WBC: CPT

## 2019-12-17 PROCEDURE — 99215 OFFICE O/P EST HI 40 MIN: CPT | Performed by: INTERNAL MEDICINE

## 2019-12-17 NOTE — PROGRESS NOTES
Subjective     REASON FOR FOLLOW UP:  1. 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 R BREAST MASS WITH GIANT R AXILLARY ADENOPATHY.  3. FAMILY HISTORY OF BREAST CANCER IN MOTHER AND SISTER, SISTER WAS TESTED FOR BRCA AND WAS NEGATIVE.                                       History of Present Illness  This patient returns today to the office in company of her brother in order to be reviewed after she has been undergoing radiation therapy to both breasts and axillae in the background of breast cancer, Stage IV disease. She had neoadjuvant therapy with very dramatic response in regard to her large primary tumor in the left breast that produced dramatic amount of enlargement of the breast tissue with ulceration, bleeding, pain and odor. She had dramatic improvement as stated above. She still had residual disease at the time of the resection of her bilateral mastectomies and she was advised to proceed with definitive radiation therapy. She has encountered a lot of difficulties with this with radiation dermatitis, grade 2-3, that has required the stopping of the treatment on a couple of occasions. She still has a tremendous degree of irritation with no blister formation at this time. Pain as expected in the chest wall. Otherwise, the patient has not had any issues in regard to cough, sputum production or shortness of breath. Her weight is stable. Bowel function with occasional diarrhea related to stressors in her life. Urination is normal. No skeletal pain besides chronic pain in the right hip. The patient according to the orthopedic report is going to require right hip replacement. Also she continues having vertigo especially with changes in position early in the morning. She will require ENT assessment for this.                                      Past Medical History:   Diagnosis Date   • Anemia in neoplastic disease    • Arthritis    • Breast cancer (CMS/HCC)     Left   • CVA (cerebral  vascular accident) (CMS/HCC)    • Hip pain     RIGHT HIP... CYST   • History of fracture of leg 1987   • Skin sore     OPEN SORE LEFT BREAST   • Syncope    • Vertigo            Past Surgical History:   Procedure Laterality Date   • FRACTURE SURGERY  1987    Leg   • HARDWARE REMOVAL     • MASTECTOMY W/ SENTINEL NODE BIOPSY Bilateral 9/16/2019    Procedure: BILATERLA MODIFIED RADICAL MASTECTOMY WITH BILATERAL SENTINEL LYMPH NODE BIOPSY;  Surgeon: Joby Barron Jr., MD;  Location: [facility] MAIN OR;  Service: General   • VENOUS ACCESS DEVICE (PORT) INSERTION Right 2/1/2019    Procedure: INSERTION VENOUS ACCESS DEVICE;  Surgeon: Joby Barron Jr., MD;  Location: [facility] OR [facility];  Service: General   • VENOUS ACCESS DEVICE (PORT) REMOVAL N/A 10/30/2019    Procedure: Mediport Removal;  Surgeon: Joby Barron Jr., MD;  Location: [facility] OR;  Service: General        Current Outpatient Medications on File Prior to Visit   Medication Sig Dispense Refill   • acetaminophen (TYLENOL) 500 MG tablet Take 500 mg by mouth Every 6 (Six) Hours As Needed for Mild Pain .     • Apoaequorin (PREVAGEN PO) Take 1 tablet by mouth Daily. Unsure of dosage     • aspirin 81 MG EC tablet Take 1 tablet by mouth Daily. 30 tablet 0   • DICLOFENAC PO Take 75 mg by mouth 2 (Two) Times a Day.     • letrozole (FEMARA) 2.5 MG tablet Take 1 tablet by mouth Daily. 30 tablet 6   • meclizine (ANTIVERT) 25 MG tablet Take 1 tablet by mouth 2 (Two) Times a Day As Needed for dizziness. 28 tablet 0   • metoprolol succinate XL (TOPROL XL) 25 MG 24 hr tablet Take 1 tablet by mouth Daily. 30 tablet 6   • Multiple Vitamin (MULTI VITAMIN DAILY PO) Take 1 tablet by mouth Daily.     • olopatadine (PATANOL) 0.1 % ophthalmic solution Administer 1 drop to both eyes 2 (Two) Times a Day. (Patient taking differently: Administer 1 drop to both eyes 2 (Two) Times a Day As Needed.) 5 mL 5   • ondansetron (ZOFRAN) 4 MG tablet Take 1 tablet by mouth Every 6 (Six) Hours As  Needed for Nausea or Vomiting for up to 20 doses. 20 tablet 0   • oxyCODONE-acetaminophen (PERCOCET) 5-325 MG per tablet 1 q 6 hrs prn pain 10 tablet 0   • silver sulfadiazine (SILVADENE, SSD) 1 % cream Apply  topically to the appropriate area as directed 2 (Two) Times a Day. 50 g 2   • IBUPROFEN PO Take 200 mg by mouth Every 4 (Four) Hours As Needed.       No current facility-administered medications on file prior to visit.         ALLERGIES:    Allergies   Allergen Reactions   • Erythromycin GI Intolerance   • Levaquin [Levofloxacin] GI Intolerance   • Penicillins GI Intolerance        Social History     Socioeconomic History   • Marital status:      Spouse name: Cruz   • Number of children: 0   • Years of education: Not on file   • Highest education level: Not on file   Occupational History   • Occupation:      Employer: SELF-EMPLOYED   Social Needs   • Financial resource strain: Not hard at all   • Food insecurity:     Worry: Never true     Inability: Never true   • Transportation needs:     Medical: No     Non-medical: No   Tobacco Use   • Smoking status: Never Smoker   • Smokeless tobacco: Never Used   Substance and Sexual Activity   • Alcohol use: Yes     Alcohol/week: 7.0 standard drinks     Types: 4 Glasses of wine, 3 Cans of beer per week     Frequency: 2-3 times a week     Drinks per session: 1 or 2     Binge frequency: Never     Comment: OCCASIONALLY   • Drug use: No   • Sexual activity: Not Currently     Partners: Male     Birth control/protection: Post-menopausal   Lifestyle   • Physical activity:     Days per week: 7 days     Minutes per session: 120 min   • Stress: Only a little        Family History   Problem Relation Age of Onset   • Lung cancer Father    • Cancer Mother    • Breast cancer Mother    • Liver disease Mother    • Breast cancer Sister 48   • Breast cancer Maternal Grandmother    • Breast cancer Paternal Grandmother    • Breast cancer Maternal Uncle    • Ronn  Hyperthermia Neg Hx         Review of Systems       General: no fever, no chills, no fatigue,no weight changes, no lack of appetite.  Eyes: no epiphora, xerophthalmia,conjunctivitis, pain, glaucoma, blurred vision, blindness, secretion, photophobia, proptosis, diplopia.  Ears: no otorrhea, tinnitus, otorrhagia, deafness, pain, vertigo.  Nose: no rhinorrhea, no epistaxis, no alteration in perception of odors, no sinuses pressure.  Mouth: no alteration in gums or teeth,  No ulcers, no difficulty with mastication or deglut ion, no odynophagia.  Neck: no masses or pain, no thyroid alterations, no pain in muscles or arteries, no carotid odynia, no crepitation.  Respiratory: no cough, no sputum production,no dyspnea,no trepopnea, no pleuritic pain,no hemoptysis.  Heart: no syncope, no irregularity, no palpitations, no angina,no orthopnea,no paroxysmal nocturnal dyspnea.  Vascular Venous: no tenderness,no edema,no palpable cords,no postphlebitic syndrome, no skin changes no ulcerations.  Vascular Arterial: no distal ischemia, noclaudication, no gangrene, no neuropathic ischemic pain, no skin ulcers, no paleness no cyanosis.  GI: no dysphagia, no odynophagia, no regurgitation, no heartburn,no indigestion,no nausea,no vomiting,no hematemesis ,no melena,no jaundice,no distention, no obstipation,no enterorrhagia,no proctalgia,no anal  lesions, no changes in bowel habits.  : no frequency, no hesitancy, no hematuria, no discharge,no  pain.  Musculoskeletal: stated muscle or tendon pain or inflammation,no  joint pain, no edema, no functional limitation,no fasciculations, no mass.  Neurologic: no headache, no seizures, noalterations on Craneal nerves, no motor deficit, no sensory deficit, normal coordination, no alteration in memory,normal orientation, calculation,normal writting, verbal and written language.  Skin: stated rashes,no pruritus   Psychiatric: no anxiety, no depression,no agitation, no delusions, proper  "insight.                    Objective     Vitals:    12/17/19 1419   BP: 142/90   Pulse: 65   Resp: 16   Temp: 98.1 °F (36.7 °C)   TempSrc: Oral   SpO2: 96%   Weight: 72.3 kg (159 lb 4.8 oz)   Height: 163.5 cm (64.37\")   PainSc: 6  Comment: RT hip pain     Current Status 12/17/2019   ECOG score 0       Physical Exam   GENERAL:  Well-developed, well-nourished  Patient  in no acute distress.   SKIN:  Warm, dry ,NO rashes,NO purpura ,NO petechiae.  HEENT:  Pupils were equal and reactive to light and accomodation, conjunctivas non injected, no pterigion, normal extraocular movements, normal visual acuity.   Mouth mucosa was moist, no exudates in oropharynx, normal gum line, normal roof of the mouth and pillars, normal papillations of the tongue.  NECK:  Supple with good range of motion; no thyromegaly or masses, no JVD or bruits, no cervical adenopathies.No carotid arteries pain, no carotid abnormal pulsation , NO arterial dance.  LYMPHATICS:  No cervical, NO supraclavicular, NO axillary,NO epitrochlear , NO inguinal adenopathy.  CHEST:  Normal excursion of both luz thoraces, normal voice fremitus, no subcutaneous emphysema, normal axillas, no rashes or acanthosis nigricans. Lungs clear to percussion and auscultation, normal breath sounds bilaterally, no wheezing,NO crackles NO ronchi, NO stridor, NO rubs.  CARDIAC AND VASCULAR:  normal rate and regular rhythm, without murmurs,NO rubs NO S3 NO S4 right or left . Normal femoral, popliteal, pedis, brachial and carotid pulses.Patient has had bilateral mastectomies. The surgical sites have healed well. She has a grade 3 radiation dermatitis in both chest walls with blister formation. No exudate. Very significant erythema. There is no alteration in either axilla.     There is no infraclavicular adenopathies. Her port has been removed.      ABDOMEN:  Soft, nontender with no organomegaly or masses, no ascites, no collateral circulation,no distention,no Berlin sign, no " abdominal pain, no inguinal hernias,no umbilical hernia, no abdominal bruits. Normal bowel sounds.  GENITAL: Not  Performed.  EXTREMITIES  AND SPINE:  No clubbing, cyanosis or edema, no deformities r  Hip pain .No kyphosis, scoliosis, deformities or pain in spine, ribs or pelvic bone.  NEUROLOGICAL:  Patient was awake, alert, oriented to time, person and place.                                                  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 At least for the last 4 years, she has had an in crescendo mass in the left breast that has produced a gigantic tumor that is now close to 25 cm in size and is replacing most of the anatomy of the left breast. There are areas of ulceration necrosis and bleeding and the mass is fixed to the chest wall. She has abundant amount of collateral circulation in the left anterior chest wall and it caught my attention the lack of any left axillary adenopathy. She has no lymphedema in the left upper extremity. In the right breast while in sitting position, no palpable abnormalities are documented. The right breast skin and nipple are normal. When the patient lies flat, there is a palpable mass at 9 o'clock from the nipple that measures probably 4 cm in size, very indistinct in regard to edges and margins. There was no mobility and no areas of tenderness. She has a giant  adenopathy in the right axilla that measures close to 9 cm in size, uniform, no fluctuation, nontender, completely fixed to the axillary wall. There is no compromise of the skin.         Since the previous visit the patient has completed all of her plan of chemotherapy neoadjuvantly. She has had a very dramatic response not only to the primary tumor in the left breast, her left breast remind ourselves was 3 times the normal size and had an ulcerated mass that was huge with bleeding and necrosis. This has resolved. She had no left axillary adenopathy but she had an induration in the right breast and most importantly she had almost a 7 cm mass in the right axilla. Since completion of chemotherapy all of these issues have resolved near completely. She has had residual mass behind the scar in the left breast that measures close to 4 cm, no left axillary adenopathy and near complete resolution of the right axillary adenopathy. No nodularity in the right breast.     On 10/15/2019 the patient completed several weeks ago her bilateral mastectomy and axillary lymph node dissection. She had no residual malignancy on any level in the chest wall. There is a tumor that had very dramatic regression in the left breast from almost 25 cm in diameter to 3.5, still positive left axillary lymph nodes. The size of them were smaller than originally thought. The right breast disclosed no primary tumor. She had a tumor located on the CT scan there before and clinically documented. The right axilla disclosed still positive lymph nodes, not surprising she had a large tumoral lesion right there. Now she has nothing left.       The patient was further reviewed on 12/17/2019. She has had radiation therapy to the chest wall and axillae. Her port was removed in anticipation of the need for radiation therapy and she has encountered a lot of difficulties with very significant dermatitis requiring interruption of her treatment on 2 different  occasions. She still has 7 treatments to go to complete. Erythema is very evident on clinical grounds and the area is sensitive, painful and hot.     The patient has had a recovery in her hemoglobin and platelet count. Her white count is normal.     The patient continues having vertigo, especially in the morning and especially when she turns her head towards the left.     She continues having pain in the right hip pertinent to degenerative arthritis. She already has been seeing Dr. Leonard, Orthopedic Medicine, who has advised the patient in the long run to proceed with a right hip replacement.     RECOMMENDATIONS:   1. The patient will remain on her Femara for the time being at the dose of 2.5 mg per day.   2. The patient will take pain medicine that will include Tylenol to try to control the pain in the right hip.   3. The patient will be referred to pain specialist that will do procedures.   4. The patient will require continuation and completion of her radiation therapy in 7 days or so.   5. She will remain on her Femara on daily basis.   6. The patient will continue a medicine that is called Skin, Hair and Nails and the above to try to improve energy and appetite stimulation.     The patient will require continuation of her Femara 2.5 mg a day.     The patient will require doctor visit in a few months.   7. Her port has been removed. There is no need for flushing of this anymore.   8. I discussed all these facts with the patient and her brother.     I made the referral for her to be seen by ENT in regard to controlling her vertigo.     I made a referral to be seen by Orthopedic Medicine in order to eventually proceed with right hip replacement.

## 2019-12-18 PROCEDURE — 77280 THER RAD SIMULAJ FIELD SMPL: CPT | Performed by: RADIOLOGY

## 2019-12-18 PROCEDURE — 77412 RADIATION TX DELIVERY LVL 3: CPT | Performed by: RADIOLOGY

## 2019-12-18 PROCEDURE — 77387 GUIDANCE FOR RADJ TX DLVR: CPT | Performed by: RADIOLOGY

## 2019-12-19 PROCEDURE — 77387 GUIDANCE FOR RADJ TX DLVR: CPT | Performed by: RADIOLOGY

## 2019-12-19 PROCEDURE — 77412 RADIATION TX DELIVERY LVL 3: CPT | Performed by: RADIOLOGY

## 2019-12-20 ENCOUNTER — RADIATION ONCOLOGY WEEKLY ASSESSMENT (OUTPATIENT)
Dept: RADIATION ONCOLOGY | Facility: HOSPITAL | Age: 59
End: 2019-12-20

## 2019-12-20 DIAGNOSIS — C50.812 MALIGNANT NEOPLASM OF OVERLAPPING SITES OF BOTH BREASTS IN FEMALE, ESTROGEN RECEPTOR POSITIVE (HCC): Primary | ICD-10-CM

## 2019-12-20 DIAGNOSIS — Z17.0 MALIGNANT NEOPLASM OF OVERLAPPING SITES OF BOTH BREASTS IN FEMALE, ESTROGEN RECEPTOR POSITIVE (HCC): Primary | ICD-10-CM

## 2019-12-20 DIAGNOSIS — C50.811 MALIGNANT NEOPLASM OF OVERLAPPING SITES OF BOTH BREASTS IN FEMALE, ESTROGEN RECEPTOR POSITIVE (HCC): Primary | ICD-10-CM

## 2019-12-20 PROCEDURE — 77412 RADIATION TX DELIVERY LVL 3: CPT | Performed by: RADIOLOGY

## 2019-12-20 PROCEDURE — 77387 GUIDANCE FOR RADJ TX DLVR: CPT | Performed by: RADIOLOGY

## 2019-12-20 NOTE — PROGRESS NOTES
Physician Weekly Management Note    Diagnosis:     Diagnosis Plan   1. Malignant neoplasm of overlapping sites of both breasts in female, estrogen receptor positive (CMS/HCC)         RT Details:  Treatment #29/34    Notes on Treatment course, Films, Medical progress:  Small areas of dry desquamation in the right chest wall, no pain, continue as planned.    Weekly Management:  Medication reviewed?   Yes  New medications given?   No  Problemlist reviewed?   Yes  Problem added?   No       Technical aspects reviewed:  Weekly OBI approved?   Yes  Weekly port films approved?   Yes  Change requests noted on port film?   No  Patient setup and plan reviewed?   Yes    Chart Reviewed:  Continue current treatment plan?   Yes  Treatment plan change requested?   No  CBC reviewed?   No  Concurrent Chemo?   No    Objective     Toxicities:   As above     Review of Systems   As above    There were no vitals filed for this visit.    Current Status 12/17/2019   ECOG score 0       Physical Exam  As above      Problem Summary List    Diagnosis:     Diagnosis Plan   1. Malignant neoplasm of overlapping sites of both breasts in female, estrogen receptor positive (CMS/HCC)       Pathology:       Past Medical History:   Diagnosis Date   • Anemia in neoplastic disease    • Arthritis    • Breast cancer (CMS/HCC)     Left   • CVA (cerebral vascular accident) (CMS/HCC)    • Hip pain     RIGHT HIP... CYST   • History of fracture of leg 1987   • Skin sore     OPEN SORE LEFT BREAST   • Syncope    • Vertigo          Past Surgical History:   Procedure Laterality Date   • FRACTURE SURGERY  1987    Leg   • HARDWARE REMOVAL     • MASTECTOMY W/ SENTINEL NODE BIOPSY Bilateral 9/16/2019    Procedure: BILATERLA MODIFIED RADICAL MASTECTOMY WITH BILATERAL SENTINEL LYMPH NODE BIOPSY;  Surgeon: Joby Barron Jr., MD;  Location: [facility];  Service: General   • VENOUS ACCESS DEVICE (PORT) INSERTION Right 2/1/2019    Procedure: INSERTION VENOUS ACCESS  DEVICE;  Surgeon: Joby Barron Jr., MD;  Location:  [facility];  Service: General   • VENOUS ACCESS DEVICE (PORT) REMOVAL N/A 10/30/2019    Procedure: Mediport Removal;  Surgeon: Joby Barron Jr., MD;  Location: [facility] MAIN OR;  Service: General         Current Outpatient Medications on File Prior to Visit   Medication Sig Dispense Refill   • acetaminophen (TYLENOL) 500 MG tablet Take 500 mg by mouth Every 6 (Six) Hours As Needed for Mild Pain .     • Apoaequorin (PREVAGEN PO) Take 1 tablet by mouth Daily. Unsure of dosage     • aspirin 81 MG EC tablet Take 1 tablet by mouth Daily. 30 tablet 0   • DICLOFENAC PO Take 75 mg by mouth 2 (Two) Times a Day.     • IBUPROFEN PO Take 200 mg by mouth Every 4 (Four) Hours As Needed.     • letrozole (FEMARA) 2.5 MG tablet Take 1 tablet by mouth Daily. 30 tablet 6   • meclizine (ANTIVERT) 25 MG tablet Take 1 tablet by mouth 2 (Two) Times a Day As Needed for dizziness. 28 tablet 0   • metoprolol succinate XL (TOPROL XL) 25 MG 24 hr tablet Take 1 tablet by mouth Daily. 30 tablet 6   • Multiple Vitamin (MULTI VITAMIN DAILY PO) Take 1 tablet by mouth Daily.     • olopatadine (PATANOL) 0.1 % ophthalmic solution Administer 1 drop to both eyes 2 (Two) Times a Day. (Patient taking differently: Administer 1 drop to both eyes 2 (Two) Times a Day As Needed.) 5 mL 5   • ondansetron (ZOFRAN) 4 MG tablet Take 1 tablet by mouth Every 6 (Six) Hours As Needed for Nausea or Vomiting for up to 20 doses. 20 tablet 0   • oxyCODONE-acetaminophen (PERCOCET) 5-325 MG per tablet 1 q 6 hrs prn pain 10 tablet 0   • silver sulfadiazine (SILVADENE, SSD) 1 % cream Apply  topically to the appropriate area as directed 2 (Two) Times a Day. 50 g 2     No current facility-administered medications on file prior to visit.        Allergies   Allergen Reactions   • Erythromycin GI Intolerance   • Levaquin [Levofloxacin] GI Intolerance   • Penicillins GI Intolerance         Primary care MD:    Provider, No  Known    Oncologist:       Seen and approved by:  Pedro Marmolejo MD  12/20/2019

## 2019-12-23 PROCEDURE — 77336 RADIATION PHYSICS CONSULT: CPT | Performed by: RADIOLOGY

## 2019-12-31 ENCOUNTER — RADIATION ONCOLOGY WEEKLY ASSESSMENT (OUTPATIENT)
Dept: RADIATION ONCOLOGY | Facility: HOSPITAL | Age: 59
End: 2019-12-31

## 2019-12-31 DIAGNOSIS — Z17.0 MALIGNANT NEOPLASM OF OVERLAPPING SITES OF BOTH BREASTS IN FEMALE, ESTROGEN RECEPTOR POSITIVE (HCC): Primary | ICD-10-CM

## 2019-12-31 DIAGNOSIS — C50.812 MALIGNANT NEOPLASM OF OVERLAPPING SITES OF BOTH BREASTS IN FEMALE, ESTROGEN RECEPTOR POSITIVE (HCC): Primary | ICD-10-CM

## 2019-12-31 DIAGNOSIS — C50.811 MALIGNANT NEOPLASM OF OVERLAPPING SITES OF BOTH BREASTS IN FEMALE, ESTROGEN RECEPTOR POSITIVE (HCC): Primary | ICD-10-CM

## 2019-12-31 PROCEDURE — 77412 RADIATION TX DELIVERY LVL 3: CPT | Performed by: RADIOLOGY

## 2019-12-31 PROCEDURE — 77387 GUIDANCE FOR RADJ TX DLVR: CPT | Performed by: RADIOLOGY

## 2019-12-31 PROCEDURE — 77427 RADIATION TX MANAGEMENT X5: CPT | Performed by: RADIOLOGY

## 2019-12-31 NOTE — PROGRESS NOTES
Physician Weekly Management Note    Diagnosis:     Diagnosis Plan   1. Malignant neoplasm of overlapping sites of both breasts in female, estrogen receptor positive (CMS/HCC)         RT Details:  Treatment #22 right chest wall    Notes on Treatment course, Films, Medical progress:  The patient returns today after a brief treatment rest for a wet desquamation.  She looks okay to resume today    Weekly Management:  Medication reviewed?   Yes  New medications given?   No  Problemlist reviewed?   Yes  Problem added?   No       Technical aspects reviewed:  Weekly OBI approved?   Yes  Weekly port films approved?   Yes  Change requests noted on port film?   No  Patient setup and plan reviewed?   Yes    Chart Reviewed:  Continue current treatment plan?   Yes  Treatment plan change requested?   No  CBC reviewed?   No  Concurrent Chemo?   No    Objective     Toxicities:   As above     Review of Systems   As above    There were no vitals filed for this visit.    Current Status 12/17/2019   ECOG score 0       Physical Exam  As above      Problem Summary List    Diagnosis:     Diagnosis Plan   1. Malignant neoplasm of overlapping sites of both breasts in female, estrogen receptor positive (CMS/HCC)       Pathology:       Past Medical History:   Diagnosis Date   • Anemia in neoplastic disease    • Arthritis    • Breast cancer (CMS/HCC)     Left   • CVA (cerebral vascular accident) (CMS/HCC)    • Hip pain     RIGHT HIP... CYST   • History of fracture of leg 1987   • Skin sore     OPEN SORE LEFT BREAST   • Syncope    • Vertigo          Past Surgical History:   Procedure Laterality Date   • FRACTURE SURGERY  1987    Leg   • HARDWARE REMOVAL     • MASTECTOMY W/ SENTINEL NODE BIOPSY Bilateral 9/16/2019    Procedure: BILATERLA MODIFIED RADICAL MASTECTOMY WITH BILATERAL SENTINEL LYMPH NODE BIOPSY;  Surgeon: Joby Barron Jr., MD;  Location: [facility];  Service: General   • VENOUS ACCESS DEVICE (PORT) INSERTION Right 2/1/2019     Procedure: INSERTION VENOUS ACCESS DEVICE;  Surgeon: Joby Barron Jr., MD;  Location: [facility];  Service: General   • VENOUS ACCESS DEVICE (PORT) REMOVAL N/A 10/30/2019    Procedure: Mediport Removal;  Surgeon: Joby Barron Jr., MD;  Location: [facility] OR;  Service: General        Current Outpatient Medications on File Prior to Visit   Medication Sig Dispense Refill   • acetaminophen (TYLENOL) 500 MG tablet Take 500 mg by mouth Every 6 (Six) Hours As Needed for Mild Pain .     • Apoaequorin (PREVAGEN PO) Take 1 tablet by mouth Daily. Unsure of dosage     • aspirin 81 MG EC tablet Take 1 tablet by mouth Daily. 30 tablet 0   • DICLOFENAC PO Take 75 mg by mouth 2 (Two) Times a Day.     • IBUPROFEN PO Take 200 mg by mouth Every 4 (Four) Hours As Needed.     • letrozole (FEMARA) 2.5 MG tablet Take 1 tablet by mouth Daily. 30 tablet 6   • meclizine (ANTIVERT) 25 MG tablet Take 1 tablet by mouth 2 (Two) Times a Day As Needed for dizziness. 28 tablet 0   • metoprolol succinate XL (TOPROL XL) 25 MG 24 hr tablet Take 1 tablet by mouth Daily. 30 tablet 6   • Multiple Vitamin (MULTI VITAMIN DAILY PO) Take 1 tablet by mouth Daily.     • olopatadine (PATANOL) 0.1 % ophthalmic solution Administer 1 drop to both eyes 2 (Two) Times a Day. (Patient taking differently: Administer 1 drop to both eyes 2 (Two) Times a Day As Needed.) 5 mL 5   • ondansetron (ZOFRAN) 4 MG tablet Take 1 tablet by mouth Every 6 (Six) Hours As Needed for Nausea or Vomiting for up to 20 doses. 20 tablet 0   • oxyCODONE-acetaminophen (PERCOCET) 5-325 MG per tablet 1 q 6 hrs prn pain 10 tablet 0   • silver sulfadiazine (SILVADENE, SSD) 1 % cream Apply  topically to the appropriate area as directed 2 (Two) Times a Day. 50 g 2     No current facility-administered medications on file prior to visit.        Allergies   Allergen Reactions   • Erythromycin GI Intolerance   • Levaquin [Levofloxacin] GI Intolerance   • Penicillins GI Intolerance          Primary care MD:    Provider, No Known    Oncologist:    JOLENE Constantino MD    Seen and approved by:  Pedro Marmolejo MD  12/31/2019

## 2020-01-01 ENCOUNTER — APPOINTMENT (OUTPATIENT)
Dept: RADIATION ONCOLOGY | Facility: HOSPITAL | Age: 60
End: 2020-01-01

## 2020-01-02 PROCEDURE — 77387 GUIDANCE FOR RADJ TX DLVR: CPT | Performed by: RADIOLOGY

## 2020-01-02 PROCEDURE — 77412 RADIATION TX DELIVERY LVL 3: CPT | Performed by: RADIOLOGY

## 2020-01-03 PROCEDURE — 77412 RADIATION TX DELIVERY LVL 3: CPT | Performed by: RADIOLOGY

## 2020-01-03 PROCEDURE — 77387 GUIDANCE FOR RADJ TX DLVR: CPT | Performed by: RADIOLOGY

## 2020-01-06 ENCOUNTER — OFFICE VISIT (OUTPATIENT)
Dept: ORTHOPEDIC SURGERY | Facility: CLINIC | Age: 60
End: 2020-01-06

## 2020-01-06 VITALS — BODY MASS INDEX: 27.14 KG/M2 | HEIGHT: 64 IN | WEIGHT: 159 LBS | TEMPERATURE: 97.7 F

## 2020-01-06 DIAGNOSIS — M16.11 ARTHRITIS OF RIGHT HIP: Primary | ICD-10-CM

## 2020-01-06 PROCEDURE — 77387 GUIDANCE FOR RADJ TX DLVR: CPT | Performed by: RADIOLOGY

## 2020-01-06 PROCEDURE — 77412 RADIATION TX DELIVERY LVL 3: CPT | Performed by: RADIOLOGY

## 2020-01-06 PROCEDURE — 77336 RADIATION PHYSICS CONSULT: CPT | Performed by: RADIOLOGY

## 2020-01-06 PROCEDURE — 99214 OFFICE O/P EST MOD 30 MIN: CPT | Performed by: ORTHOPAEDIC SURGERY

## 2020-01-06 RX ORDER — VANCOMYCIN HYDROCHLORIDE 1 G/200ML
15 INJECTION, SOLUTION INTRAVENOUS ONCE
Status: CANCELLED | OUTPATIENT
Start: 2020-03-02 | End: 2020-01-06

## 2020-01-06 RX ORDER — CEFAZOLIN SODIUM 2 G/100ML
2 INJECTION, SOLUTION INTRAVENOUS ONCE
Status: CANCELLED | OUTPATIENT
Start: 2020-03-02 | End: 2020-01-06

## 2020-01-06 NOTE — PROGRESS NOTES
"Patient Name: Marylu Georges   YOB: 1960  Referring Primary Care Physician: Provider, No Known  BMI: Body mass index is 27.29 kg/m².    Chief Complaint:    Chief Complaint   Patient presents with   • Right Hip - Follow-up        HPI:     Marylu Georges is a 59 y.o. female who presents today for evaluation of   Chief Complaint   Patient presents with   • Right Hip - Follow-up   .  Kelsie follows up on her right hip today and is killing her.  Is making it hard to walk around.  When she is at the track riding horses it seems to be better but now is the off season.  She seen with her brother who is from out of town who \"asked the questions and takes notes\".  She is finished treatment for breast cancer including chemo and radiation which she is just finishing now.  The hip started to bother her when she sleeps etc. is really interfering with quality of her life she tried to do some therapeutic exercises but it hurt too much with her ankylosed hip she sees Dr. Constantino    This problem is not new to this examiner.     Subjective   Medications:   Home Medications:  Current Outpatient Medications on File Prior to Visit   Medication Sig   • acetaminophen (TYLENOL) 500 MG tablet Take 500 mg by mouth Every 6 (Six) Hours As Needed for Mild Pain .   • Apoaequorin (PREVAGEN PO) Take 1 tablet by mouth Daily. Unsure of dosage   • aspirin 81 MG EC tablet Take 1 tablet by mouth Daily.   • DICLOFENAC PO Take 75 mg by mouth 2 (Two) Times a Day.   • IBUPROFEN PO Take 200 mg by mouth Every 4 (Four) Hours As Needed.   • letrozole (FEMARA) 2.5 MG tablet Take 1 tablet by mouth Daily.   • meclizine (ANTIVERT) 25 MG tablet Take 1 tablet by mouth 2 (Two) Times a Day As Needed for dizziness.   • metoprolol succinate XL (TOPROL XL) 25 MG 24 hr tablet Take 1 tablet by mouth Daily.   • Multiple Vitamin (MULTI VITAMIN DAILY PO) Take 1 tablet by mouth Daily.   • olopatadine (PATANOL) 0.1 % ophthalmic solution Administer 1 drop to both " eyes 2 (Two) Times a Day. (Patient taking differently: Administer 1 drop to both eyes 2 (Two) Times a Day As Needed.)   • ondansetron (ZOFRAN) 4 MG tablet Take 1 tablet by mouth Every 6 (Six) Hours As Needed for Nausea or Vomiting for up to 20 doses.   • oxyCODONE-acetaminophen (PERCOCET) 5-325 MG per tablet 1 q 6 hrs prn pain   • silver sulfadiazine (SILVADENE, SSD) 1 % cream Apply  topically to the appropriate area as directed 2 (Two) Times a Day.     No current facility-administered medications on file prior to visit.      Current Medications:  Scheduled Meds:  Continuous Infusions:  No current facility-administered medications for this visit.   PRN Meds:.    I have reviewed the patient's medical history in detail and updated the computerized patient record.  Review and summarization of old records includes:    Past Medical History:   Diagnosis Date   • Anemia in neoplastic disease    • Arthritis    • Breast cancer (CMS/HCC)     Left   • CVA (cerebral vascular accident) (CMS/HCC)    • Hip pain     RIGHT HIP... CYST   • History of fracture of leg 1987   • Skin sore     OPEN SORE LEFT BREAST   • Syncope    • Vertigo         Past Surgical History:   Procedure Laterality Date   • FRACTURE SURGERY  1987    Leg   • HARDWARE REMOVAL     • MASTECTOMY W/ SENTINEL NODE BIOPSY Bilateral 9/16/2019    Procedure: BILATERLA MODIFIED RADICAL MASTECTOMY WITH BILATERAL SENTINEL LYMPH NODE BIOPSY;  Surgeon: Joby Barron Jr., MD;  Location: [facility];  Service: General   • VENOUS ACCESS DEVICE (PORT) INSERTION Right 2/1/2019    Procedure: INSERTION VENOUS ACCESS DEVICE;  Surgeon: Joby Barron Jr., MD;  Location: [facility], [place], operated by [facility];  Service: General   • VENOUS ACCESS DEVICE (PORT) REMOVAL N/A 10/30/2019    Procedure: Mediport Removal;  Surgeon: Joby Barron Jr., MD;  Location: [facility] OR;  Service: General        Social History     Occupational History   • Occupation:      Employer: SELF-EMPLOYED   Tobacco  Use   • Smoking status: Never Smoker   • Smokeless tobacco: Never Used   Substance and Sexual Activity   • Alcohol use: Yes     Alcohol/week: 7.0 standard drinks     Types: 4 Glasses of wine, 3 Cans of beer per week     Frequency: 2-3 times a week     Drinks per session: 1 or 2     Binge frequency: Never     Comment: OCCASIONALLY   • Drug use: No   • Sexual activity: Not Currently     Partners: Male     Birth control/protection: Post-menopausal      Social History     Social History Narrative   • Not on file        Family History   Problem Relation Age of Onset   • Lung cancer Father    • Cancer Mother    • Breast cancer Mother    • Liver disease Mother    • Breast cancer Sister 48   • Breast cancer Maternal Grandmother    • Breast cancer Paternal Grandmother    • Breast cancer Maternal Uncle    • Malig Hyperthermia Neg Hx        ROS: 14 point review of systems was performed and all other systems were reviewed and are negative except for documented findings in HPI and today's encounter.     Allergies:   Allergies   Allergen Reactions   • Erythromycin GI Intolerance   • Levaquin [Levofloxacin] GI Intolerance   • Penicillins GI Intolerance     Constitutional:  Denies fever, shaking or chills   Eyes:  Denies change in visual acuity   HENT:  Denies nasal congestion or sore throat   Respiratory:  Denies cough or shortness of breath   Cardiovascular:  Denies chest pain or severe LE edema   GI:  Denies abdominal pain, nausea, vomiting, bloody stools or diarrhea   Musculoskeletal:  Numbness, tingling, pain, or loss of motor function only as noted above in history of present illness.  : Denies painful urination or hematuria  Integument:  Denies rash, lesion or ulceration   Neurologic:  Denies headache or focal weakness  Endocrine:  Denies lymphadenopathy  Psych:  Denies confusion or change in mental status   Hem:  Denies active bleeding    OBJECTIVE:  Physical Exam: 59 y.o. female  Wt Readings from Last 3 Encounters:  "  01/06/20 72.1 kg (159 lb)   12/17/19 72.3 kg (159 lb 4.8 oz)   12/13/19 71.6 kg (157 lb 12.8 oz)     Ht Readings from Last 1 Encounters:   01/06/20 162.6 cm (64\")     Body mass index is 27.29 kg/m².  Vitals:    01/06/20 0837   Temp: 97.7 °F (36.5 °C)     Vital signs reviewed.     General Appearance:    Alert, cooperative, in no acute distress                  Eyes: conjunctiva clear  ENT: external ears and nose atraumatic  CV: no peripheral edema  Resp: normal respiratory effort  Skin: no rashes or wounds; normal turgor  Psych: mood and affect appropriate  Lymph: no nodes appreciated  Neuro: gross sensation intact  Vascular:  Palpable peripheral pulse in noted extremity  Musculoskeletal Extremities: Exam shows today she has pretty noticeable limp Stinchfield's positive no internal or external rotation of the hip to speak of and she is very tender in the groin and radiates down towards the knee    Radiology:   AP of the hips lateral right hip taken on October 28 and reviewed at this time with the patient and her brother without comparison views available show essentially severe end-stage right hip arthritis with ankylosis of the joint    Assessment:     ICD-10-CM ICD-9-CM   1. Arthritis of right hip M16.11 716.95        Procedures       Plan: SONA: Obtain medical clearance for surgery. Continuation of conservative management vs. SONA discussed.  I reviewed anatomy of a total hip arthroplasty in laymen's terms, as well as typical postoperative recovery and possibly 6-12 months for maximal recovery, and possible need for rehabilitation stay after hospitalization. We also discussed risks, benefits, alternatives, and limitations of procedure with risks including but not limited to neurovascular damage, bleeding, infection, malalignment, chronic pian, failure of implants, periprosthetic fracture, osteolysis, loosening of implants, loss of motion, weakness, stiffness, instability, DVT, pulmonary embolus, death, stroke, " "complex regional pain syndrome, myocardial infarction, and need for additional procedures. Concept of substitution vs. replacement discussed.  No guarantees were given regarding results of surgery.  Patient verbalized understanding, and was given the opportunity to ask and have all questions answered today. She is due to see Dr. Constantino and said he in fact serves as her primary care.  Is a history of \"cerebrovascular accident\" in her history but she says she never had symptoms she had fallen off a horse which because she felt dizzy they did a 3 or 4-day hospital mission and \"ran every test in the book and saw something old\" see if Dr. Constantino can clear if she needs somebody else but we will go ahead and plan on doing a total hip arthroplasty when she is ready and when she finishes her radiation      1/6/2020    Much of this encounter note is an electronic transcription/translation of spoken language to printed text. The electronic translation of spoken language may permit erroneous, or at times, nonsensical words or phrases to be inadvertently transcribed; Although I have reviewed the note for such errors, some may still exist    "

## 2020-01-07 ENCOUNTER — RADIATION ONCOLOGY WEEKLY ASSESSMENT (OUTPATIENT)
Dept: RADIATION ONCOLOGY | Facility: HOSPITAL | Age: 60
End: 2020-01-07

## 2020-01-07 DIAGNOSIS — C50.812 MALIGNANT NEOPLASM OF OVERLAPPING SITES OF BOTH BREASTS IN FEMALE, ESTROGEN RECEPTOR POSITIVE (HCC): Primary | ICD-10-CM

## 2020-01-07 DIAGNOSIS — C50.811 MALIGNANT NEOPLASM OF OVERLAPPING SITES OF BOTH BREASTS IN FEMALE, ESTROGEN RECEPTOR POSITIVE (HCC): Primary | ICD-10-CM

## 2020-01-07 DIAGNOSIS — Z17.0 MALIGNANT NEOPLASM OF OVERLAPPING SITES OF BOTH BREASTS IN FEMALE, ESTROGEN RECEPTOR POSITIVE (HCC): Primary | ICD-10-CM

## 2020-01-07 PROCEDURE — 77412 RADIATION TX DELIVERY LVL 3: CPT | Performed by: RADIOLOGY

## 2020-01-07 PROCEDURE — 77280 THER RAD SIMULAJ FIELD SMPL: CPT | Performed by: RADIOLOGY

## 2020-01-07 NOTE — PROGRESS NOTES
Physician Weekly Management Note    Diagnosis:     Diagnosis Plan   1. Malignant neoplasm of overlapping sites of both breasts in female, estrogen receptor positive (CMS/HCC)         RT Details:  Treatment #30/34    Notes on Treatment course, Films, Medical progress:  Doing well no complaints or questions.  Continue as planned.    Weekly Management:  Medication reviewed?   Yes  New medications given?   No  Problemlist reviewed?   Yes  Problem added?   No       Technical aspects reviewed:  Weekly OBI approved?   Yes  Weekly port films approved?   Yes  Change requests noted on port film?   No  Patient setup and plan reviewed?   Yes    Chart Reviewed:  Continue current treatment plan?   Yes  Treatment plan change requested?   No  CBC reviewed?   No  Concurrent Chemo?   No    Objective     Toxicities:   As above     Review of Systems   As above    There were no vitals filed for this visit.    Current Status 12/17/2019   ECOG score 0       Physical Exam  As above      Problem Summary List    Diagnosis:     Diagnosis Plan   1. Malignant neoplasm of overlapping sites of both breasts in female, estrogen receptor positive (CMS/HCC)       Pathology:       Past Medical History:   Diagnosis Date   • Anemia in neoplastic disease    • Arthritis    • Breast cancer (CMS/HCC)     Left   • CVA (cerebral vascular accident) (CMS/HCC)    • Hip pain     RIGHT HIP... CYST   • History of fracture of leg 1987   • Skin sore     OPEN SORE LEFT BREAST   • Syncope    • Vertigo          Past Surgical History:   Procedure Laterality Date   • FRACTURE SURGERY  1987    Leg   • HARDWARE REMOVAL     • MASTECTOMY W/ SENTINEL NODE BIOPSY Bilateral 9/16/2019    Procedure: BILATERLA MODIFIED RADICAL MASTECTOMY WITH BILATERAL SENTINEL LYMPH NODE BIOPSY;  Surgeon: Joby Barron Jr., MD;  Location: [facility];  Service: General   • VENOUS ACCESS DEVICE (PORT) INSERTION Right 2/1/2019    Procedure: INSERTION VENOUS ACCESS DEVICE;  Surgeon: Anderson  Joby Brito MD;  Location[facility];  Service: General   • VENOUS ACCESS DEVICE (PORT) REMOVAL N/A 10/30/2019    Procedure: Mediport Removal;  Surgeon: Joby Barron Jr., MD;  Location: [facility] OR;  Service: General         Current Outpatient Medications on File Prior to Visit   Medication Sig Dispense Refill   • acetaminophen (TYLENOL) 500 MG tablet Take 500 mg by mouth Every 6 (Six) Hours As Needed for Mild Pain .     • Apoaequorin (PREVAGEN PO) Take 1 tablet by mouth Daily. Unsure of dosage     • aspirin 81 MG EC tablet Take 1 tablet by mouth Daily. 30 tablet 0   • DICLOFENAC PO Take 75 mg by mouth 2 (Two) Times a Day.     • IBUPROFEN PO Take 200 mg by mouth Every 4 (Four) Hours As Needed.     • letrozole (FEMARA) 2.5 MG tablet Take 1 tablet by mouth Daily. 30 tablet 6   • meclizine (ANTIVERT) 25 MG tablet Take 1 tablet by mouth 2 (Two) Times a Day As Needed for dizziness. 28 tablet 0   • metoprolol succinate XL (TOPROL XL) 25 MG 24 hr tablet Take 1 tablet by mouth Daily. 30 tablet 6   • Multiple Vitamin (MULTI VITAMIN DAILY PO) Take 1 tablet by mouth Daily.     • olopatadine (PATANOL) 0.1 % ophthalmic solution Administer 1 drop to both eyes 2 (Two) Times a Day. (Patient taking differently: Administer 1 drop to both eyes 2 (Two) Times a Day As Needed.) 5 mL 5   • ondansetron (ZOFRAN) 4 MG tablet Take 1 tablet by mouth Every 6 (Six) Hours As Needed for Nausea or Vomiting for up to 20 doses. 20 tablet 0   • oxyCODONE-acetaminophen (PERCOCET) 5-325 MG per tablet 1 q 6 hrs prn pain 10 tablet 0   • silver sulfadiazine (SILVADENE, SSD) 1 % cream Apply  topically to the appropriate area as directed 2 (Two) Times a Day. 50 g 2     No current facility-administered medications on file prior to visit.        Allergies   Allergen Reactions   • Erythromycin GI Intolerance   • Levaquin [Levofloxacin] GI Intolerance   • Penicillins GI Intolerance        Primary care MD:    Provider, No Known    Oncologist:      I  Zack RICHMOND    Seen and approved by:  Pedro Marmolejo MD  01/07/2020

## 2020-01-08 PROCEDURE — 77412 RADIATION TX DELIVERY LVL 3: CPT | Performed by: RADIOLOGY

## 2020-01-08 PROCEDURE — 77427 RADIATION TX MANAGEMENT X5: CPT | Performed by: RADIOLOGY

## 2020-01-09 PROCEDURE — 77412 RADIATION TX DELIVERY LVL 3: CPT | Performed by: RADIOLOGY

## 2020-01-10 PROCEDURE — 77412 RADIATION TX DELIVERY LVL 3: CPT | Performed by: RADIOLOGY

## 2020-01-13 ENCOUNTER — DOCUMENTATION (OUTPATIENT)
Dept: RADIATION ONCOLOGY | Facility: HOSPITAL | Age: 60
End: 2020-01-13

## 2020-01-13 DIAGNOSIS — Z17.0 MALIGNANT NEOPLASM OF OVERLAPPING SITES OF LEFT BREAST IN FEMALE, ESTROGEN RECEPTOR POSITIVE (HCC): Primary | ICD-10-CM

## 2020-01-13 DIAGNOSIS — C50.812 MALIGNANT NEOPLASM OF OVERLAPPING SITES OF LEFT BREAST IN FEMALE, ESTROGEN RECEPTOR POSITIVE (HCC): Primary | ICD-10-CM

## 2020-01-13 PROBLEM — M16.11 ARTHRITIS OF RIGHT HIP: Status: ACTIVE | Noted: 2020-01-13

## 2020-01-13 PROCEDURE — 77336 RADIATION PHYSICS CONSULT: CPT | Performed by: RADIOLOGY

## 2020-01-13 PROCEDURE — 77412 RADIATION TX DELIVERY LVL 3: CPT | Performed by: RADIOLOGY

## 2020-01-14 NOTE — PROGRESS NOTES
Patient: Marylu Georges  YOB: 1960    RADIATION THERAPY TREATMENT SUMMARY  Diagnosis:    Malignant neoplasm of overlapping sites of both breasts in female, estrogen receptor positive (CMS/HCC)       Patient Care Team:  Joby Barron Jr., MD as Referring Physician (General Surgery)  Elie Dixon MD as Consulting Physician (Hematology and Oncology)  Pedro Marmolejo MD as Consulting Physician (Radiation Oncology)    Marylu Georges has recently completed the course of radiation therapy prescribed for the above-mentioned diagnosis.  Below, please find the specifics of the course of treatment delivered:    Radiation Details:    Dates of treatment:  10/31/2019 - 1/13/2020.  Treatment Site - RT SCAR BOOST  Treatment Site - RT SC  Treatment Intent - Curative, Postoperative  Treatment Intent - Curative, Postoperative  Total Dose in cGy - 1000  Total Dose in cGy - 5000  Number of Treatments - 5  Number of Treatments - 25  Dose per fraction - 200 cGy per fraction  Dose per fraction - 200 cGy per fraction  Fractions per day - 1 fx/day  Fractions per day - 1 fx/day  Fractions per week - 5 fx/week  Fractions per week - 5 fx/week  Treatment Type - Electrons  Treatment Type - Single field, 3D  Energy - 6 MeV  Energy - 6 MVP, 18 MVP  Normalization - Prescribed at 100%  Normalization - Calc point, See below  Imaging/Field Verification - Simulation before first treatment to verify field, blocking, placement and positioning  Imaging/Field Verification - Simulation before first treatment to verify field, blocking, placement and positioning, Simulation for all ports of change, Other, see below  Additional comments: - D3. 2 IGRT USING OSMS,DIBH DAILY    Treatment Site - LT CHEST WALL  Treatment Site - RT CHEST WALL  Treatment Intent - Curative, Postoperative  Treatment Intent - Curative, Postoperative  Total Dose in cGy - 5000  Total Dose in cGy - 5000  Number of Treatments - 25  Number of Treatments - 25  Dose  per fraction - 200 cGy per fraction  Dose per fraction - 200 cGy per fraction  Fractions per day - 1 fx/day  Fractions per day - 1 fx/day  Fractions per week - 5 fx/week  Fractions per week - 5 fx/week  Treatment Type - Tangents, 3D  Treatment Type - Tangents, 3D  Energy - 6 MVP, 18 MVP  Energy - 6 MVP, 18 MVP  Normalization - Calc point, Prescribed at 100%  Normalization - Calc point, Prescribed at 100%  Imaging/Field Verification - Simulation before first treatment to verify field, blocking, placement and positioning, Simulation for all ports of change, Weekly port films of all ports, Other, see below  Imaging/Field Verification - Simulation before first treatment to verify field, blocking, placement and positioning, Simulation for all ports of change, Weekly port films of all ports, Other, see below  Additional comments: - . 5CM BOLUS DAILY IGRT USING OSMS,DIBH DAILY    Treatment Site - LT SC  Treatment Intent - Curative, Postoperative  Total Dose in cGy - 5000  Number of Treatments - 25  Dose per fraction - 200 cGy per fraction  Fractions per day - 1 fx/day  Fractions per week - 5 fx/week  Treatment Type - Single field, 3D  Energy - 6 MVP, 18 MVP  Normalization - Calc point, Prescribed at 100%, See below  Imaging/Field Verification - Simulation before first treatment to verify field, blocking, placement and positioning, Simulation for all ports of change, Weekly port films of all ports, Other, see below  Additional comments: - SC D3. 2 D3. 2 IGRT USING OSMS,DIBH DAILY      Treatment Site - LT SCAR BOOST  Treatment Intent - Curative, Postoperative  Total Dose in cGy - 1000  Number of Treatments - 5  Dose per fraction - 200 cGy per fraction  Fractions per day - 1 fx/day  Fractions per week - 5 fx/week  Treatment Type - Single field, Electrons  Energy - 6 MeV  Normalization - Calc point, Prescribed at 100%  Imaging/Field Verification - Simulation before first treatment to verify field, blocking, placement and  positioning, Simulation for all ports of change, Weekly port films of all ports  Additional comments: . 5CM BOLUS DAILY      Treatment Site - RT PAB  Treatment Intent - Curative  Total Dose in cGy - 606  Number of Treatments - 3  Dose per fraction - See below  Fractions per day - 1 fx/day  Fractions per week - 3 fx/week  Treatment Type - Single field  Energy - 18 MVP  Normalization - Calc point, Prescribed at 100%  Imaging/Field Verification - Simulation before first treatment to verify field, blocking, placement and positioning, Simulation for all ports of change, Weekly port films of all ports  Additional comments: 202X3 FOR MIDPLANE AX DOSE OF 45G    Treatment Site - LT PAB  Treatment Intent - Curative  Total Dose in cGy - 436  Number of treatments - 2  Dose per fraction - See below  Fractions per day - 1 fx/day  Fractions per week - 3 fx/week  Treatment Type - Single field  Energy - 18 MVP  Normalization - Calc point, Prescribed at 100%  Imaging/Field Verification - Simulation before first treatment to verify field, blocking, placement and positioning, Simulation for all ports of change, Weekly port films of all ports  Additional comments: 202X3 FOR MIDPLANE AX DOSE OF 45G      Tolerance and Toxicities: she tolerated the treatments well , requiring 2 treatment breaks on 12/2 to 12/6 and 12/23 to 12/30 due to skin breakdown. she completed the treatments in 74 elapsed days.    Follow-up Plans:  I have asked the patient to return to see me in approximately 5 weeks .  I have also made a referral to our Survivorship Clinic.

## 2020-01-15 ENCOUNTER — DOCUMENTATION (OUTPATIENT)
Dept: OTHER | Facility: HOSPITAL | Age: 60
End: 2020-01-15

## 2020-01-15 NOTE — PROGRESS NOTES
Call placed to patient RE: open referral to survivorship clinic from Dr Marmolejo. Our office spoke to the patient and reviewed purpose and goals of survivorship visit as well as what to expect. Patient declines at present due to lack of perceived need. Patient encouraged to call anytime should they reconsider.

## 2020-01-15 NOTE — PROGRESS NOTES
Call placed to patient RE: open referral to survivorship clinic from Dr Marmolejo. Our office spoke to the patient and reviewed purpose and goals of survivorship visit as well as what to expect. Patient declines at present due to no perceived need. Patient encouraged to call anytime should they reconsider.     Yes...

## 2020-01-21 ENCOUNTER — LAB (OUTPATIENT)
Dept: LAB | Facility: HOSPITAL | Age: 60
End: 2020-01-21

## 2020-01-21 ENCOUNTER — OFFICE VISIT (OUTPATIENT)
Dept: ONCOLOGY | Facility: CLINIC | Age: 60
End: 2020-01-21

## 2020-01-21 VITALS
HEART RATE: 61 BPM | HEIGHT: 64 IN | RESPIRATION RATE: 12 BRPM | TEMPERATURE: 98.5 F | SYSTOLIC BLOOD PRESSURE: 127 MMHG | WEIGHT: 162.8 LBS | BODY MASS INDEX: 27.79 KG/M2 | DIASTOLIC BLOOD PRESSURE: 87 MMHG | OXYGEN SATURATION: 98 %

## 2020-01-21 DIAGNOSIS — L58.0 ACUTE RADIATION DERMATITIS: ICD-10-CM

## 2020-01-21 DIAGNOSIS — Z17.0 MALIGNANT NEOPLASM OF OVERLAPPING SITES OF LEFT BREAST IN FEMALE, ESTROGEN RECEPTOR POSITIVE (HCC): Primary | ICD-10-CM

## 2020-01-21 DIAGNOSIS — Z80.3 FAMILY HISTORY OF BREAST CANCER IN FEMALE: ICD-10-CM

## 2020-01-21 DIAGNOSIS — C50.812 MALIGNANT NEOPLASM OF OVERLAPPING SITES OF LEFT BREAST IN FEMALE, ESTROGEN RECEPTOR POSITIVE (HCC): ICD-10-CM

## 2020-01-21 DIAGNOSIS — D75.89 MACROCYTOSIS: ICD-10-CM

## 2020-01-21 DIAGNOSIS — Z17.0 MALIGNANT NEOPLASM OF OVERLAPPING SITES OF BOTH BREASTS IN FEMALE, ESTROGEN RECEPTOR POSITIVE (HCC): ICD-10-CM

## 2020-01-21 DIAGNOSIS — R55 SYNCOPE, UNSPECIFIED SYNCOPE TYPE: ICD-10-CM

## 2020-01-21 DIAGNOSIS — C50.812 MALIGNANT NEOPLASM OF OVERLAPPING SITES OF BOTH BREASTS IN FEMALE, ESTROGEN RECEPTOR POSITIVE (HCC): ICD-10-CM

## 2020-01-21 DIAGNOSIS — I10 ESSENTIAL HYPERTENSION: ICD-10-CM

## 2020-01-21 DIAGNOSIS — Z17.0 MALIGNANT NEOPLASM OF OVERLAPPING SITES OF LEFT BREAST IN FEMALE, ESTROGEN RECEPTOR POSITIVE (HCC): ICD-10-CM

## 2020-01-21 DIAGNOSIS — C50.811 MALIGNANT NEOPLASM OF OVERLAPPING SITES OF BOTH BREASTS IN FEMALE, ESTROGEN RECEPTOR POSITIVE (HCC): ICD-10-CM

## 2020-01-21 DIAGNOSIS — C50.812 MALIGNANT NEOPLASM OF OVERLAPPING SITES OF LEFT BREAST IN FEMALE, ESTROGEN RECEPTOR POSITIVE (HCC): Primary | ICD-10-CM

## 2020-01-21 PROBLEM — Z45.2 ENCOUNTER FOR VENOUS ACCESS DEVICE CARE: Status: RESOLVED | Noted: 2019-10-28 | Resolved: 2020-01-21

## 2020-01-21 PROBLEM — Z45.2 FITTING AND ADJUSTMENT OF VASCULAR CATHETER: Status: RESOLVED | Noted: 2019-02-01 | Resolved: 2020-01-21

## 2020-01-21 LAB
ALBUMIN SERPL-MCNC: 4.5 G/DL (ref 3.5–5.2)
ALBUMIN/GLOB SERPL: 1.8 G/DL (ref 1.1–2.4)
ALP SERPL-CCNC: 96 U/L (ref 38–116)
ALT SERPL W P-5'-P-CCNC: 30 U/L (ref 0–33)
ANION GAP SERPL CALCULATED.3IONS-SCNC: 13.3 MMOL/L (ref 5–15)
AST SERPL-CCNC: 26 U/L (ref 0–32)
BASOPHILS # BLD AUTO: 0.03 10*3/MM3 (ref 0–0.2)
BASOPHILS NFR BLD AUTO: 0.8 % (ref 0–1.5)
BILIRUB SERPL-MCNC: 0.2 MG/DL (ref 0.2–1.2)
BUN BLD-MCNC: 24 MG/DL (ref 6–20)
BUN/CREAT SERPL: 28.2 (ref 7.3–30)
CALCIUM SPEC-SCNC: 9.6 MG/DL (ref 8.5–10.2)
CHLORIDE SERPL-SCNC: 101 MMOL/L (ref 98–107)
CO2 SERPL-SCNC: 27.7 MMOL/L (ref 22–29)
CREAT BLD-MCNC: 0.85 MG/DL (ref 0.6–1.1)
DEPRECATED RDW RBC AUTO: 51.1 FL (ref 37–54)
EOSINOPHIL # BLD AUTO: 0.11 10*3/MM3 (ref 0–0.4)
EOSINOPHIL NFR BLD AUTO: 2.9 % (ref 0.3–6.2)
ERYTHROCYTE [DISTWIDTH] IN BLOOD BY AUTOMATED COUNT: 14.4 % (ref 12.3–15.4)
GFR SERPL CREATININE-BSD FRML MDRD: 68 ML/MIN/1.73
GLOBULIN UR ELPH-MCNC: 2.5 GM/DL (ref 1.8–3.5)
GLUCOSE BLD-MCNC: 83 MG/DL (ref 74–124)
HCT VFR BLD AUTO: 40.9 % (ref 34–46.6)
HGB BLD-MCNC: 13 G/DL (ref 12–15.9)
IMM GRANULOCYTES # BLD AUTO: 0.01 10*3/MM3 (ref 0–0.05)
IMM GRANULOCYTES NFR BLD AUTO: 0.3 % (ref 0–0.5)
LYMPHOCYTES # BLD AUTO: 0.59 10*3/MM3 (ref 0.7–3.1)
LYMPHOCYTES NFR BLD AUTO: 15.7 % (ref 19.6–45.3)
MCH RBC QN AUTO: 30.7 PG (ref 26.6–33)
MCHC RBC AUTO-ENTMCNC: 31.8 G/DL (ref 31.5–35.7)
MCV RBC AUTO: 96.5 FL (ref 79–97)
MONOCYTES # BLD AUTO: 0.39 10*3/MM3 (ref 0.1–0.9)
MONOCYTES NFR BLD AUTO: 10.4 % (ref 5–12)
NEUTROPHILS # BLD AUTO: 2.62 10*3/MM3 (ref 1.7–7)
NEUTROPHILS NFR BLD AUTO: 69.9 % (ref 42.7–76)
NRBC BLD AUTO-RTO: 0 /100 WBC (ref 0–0.2)
PLATELET # BLD AUTO: 170 10*3/MM3 (ref 140–450)
PMV BLD AUTO: 8.2 FL (ref 6–12)
POTASSIUM BLD-SCNC: 4.6 MMOL/L (ref 3.5–4.7)
PROT SERPL-MCNC: 7 G/DL (ref 6.3–8)
RBC # BLD AUTO: 4.24 10*6/MM3 (ref 3.77–5.28)
SODIUM BLD-SCNC: 142 MMOL/L (ref 134–145)
WBC NRBC COR # BLD: 3.75 10*3/MM3 (ref 3.4–10.8)

## 2020-01-21 PROCEDURE — 36415 COLL VENOUS BLD VENIPUNCTURE: CPT

## 2020-01-21 PROCEDURE — 85025 COMPLETE CBC W/AUTO DIFF WBC: CPT

## 2020-01-21 PROCEDURE — 99214 OFFICE O/P EST MOD 30 MIN: CPT | Performed by: INTERNAL MEDICINE

## 2020-01-21 PROCEDURE — 80053 COMPREHEN METABOLIC PANEL: CPT

## 2020-01-21 NOTE — PROGRESS NOTES
Subjective     REASON FOR FOLLOW UP:  1. 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 R BREAST MASS WITH GIANT R AXILLARY ADENOPATHY.  3. FAMILY HISTORY OF BREAST CANCER IN MOTHER AND SISTER, SISTER WAS TESTED FOR BRCA 1 and 2 AND Were NEGATIVE.File Link     Scan on 4/23/2019 1411 by Ely Dockery: GENETIC TEST RESULTS WITH F/U NOTE [id number] on 4/23/2019 1411 by Ely Dockery: GENETIC TEST RESULTS WITH F/U NOTE 4-2-2019   Key Information     Document ID File Type Document Type Description   [id number] Image GENETICS - SCAN GENETIC TEST RESULTS WITH F/U NOTE 4-2-2019   Import Information     Attached At Date Time User Dept   Encounter Level 4/23/2019  2:11 PM Ely Dockery [facility]   Encounter     Clinical Support on 4/2/19 with GENETIC CLINIC                                            History of Present Illness  This patient returns today to the office for followup stating that the only problem that she has is her persistent issues in her right hip. She is already scheduled for surgery in the 1st week in 03/2020. She cannot wait for this time to happen. Other than that, her appetite has returned to normality. Her hair is growing well. 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 She finally completed her radiation treatment 2 weeks ago. She has had normal bowel activity, normal urination. She remains on her aromatase inhibitor. She has not encountered any side effects with this medication. She has become fully functional with the exception of the hip and the issues pertinent to this.                                          Past Medical History:   Diagnosis Date   • Anemia in neoplastic disease    • Arthritis    • Breast cancer (CMS/HCC)     Left   • CVA (cerebral vascular accident) (CMS/HCC)    • Hip pain     RIGHT HIP... CYST   • History of fracture of leg 1987    • Skin sore     OPEN SORE LEFT BREAST   • Syncope    • Vertigo            Past Surgical History:   Procedure Laterality Date   • FRACTURE SURGERY  1987    Leg   • HARDWARE REMOVAL     • MASTECTOMY W/ SENTINEL NODE BIOPSY Bilateral 9/16/2019    Procedure: BILATERLA MODIFIED RADICAL MASTECTOMY WITH BILATERAL SENTINEL LYMPH NODE BIOPSY;  Surgeon: Joby Barron Jr., MD;  Location: [facility] OR;  Service: General   • VENOUS ACCESS DEVICE (PORT) INSERTION Right 2/1/2019    Procedure: INSERTION VENOUS ACCESS DEVICE;  Surgeon: Joby Barron Jr., MD;  Location: [facility];  Service: General   • VENOUS ACCESS DEVICE (PORT) REMOVAL N/A 10/30/2019    Procedure: Mediport Removal;  Surgeon: Joby Barron Jr., MD;  Location: [facility] OR;  Service: General        Current Outpatient Medications on File Prior to Visit   Medication Sig Dispense Refill   • acetaminophen (TYLENOL) 500 MG tablet Take 500 mg by mouth Every 6 (Six) Hours As Needed for Mild Pain .     • Apoaequorin (PREVAGEN PO) Take 1 tablet by mouth Daily. Unsure of dosage     • aspirin 81 MG EC tablet Take 1 tablet by mouth Daily. 30 tablet 0   • DICLOFENAC PO Take 75 mg by mouth 2 (Two) Times a Day.     • IBUPROFEN PO Take 200 mg by mouth Every 4 (Four) Hours As Needed.     • letrozole (FEMARA) 2.5 MG tablet Take 1 tablet by mouth Daily. 30 tablet 6   • meclizine (ANTIVERT) 25 MG tablet Take 1 tablet by mouth 2 (Two) Times a Day As Needed for dizziness. 28 tablet 0   • metoprolol succinate XL (TOPROL XL) 25 MG 24 hr tablet Take 1 tablet by mouth Daily. 30 tablet 6   • Multiple Vitamin (MULTI VITAMIN DAILY PO) Take 1 tablet by mouth Daily.     • olopatadine (PATANOL) 0.1 % ophthalmic solution Administer 1 drop to both eyes 2 (Two) Times a Day. (Patient taking differently: Administer 1 drop to both eyes 2 (Two) Times a Day As Needed.) 5 mL 5   • ondansetron (ZOFRAN) 4 MG tablet Take 1 tablet by mouth Every 6 (Six) Hours As Needed for Nausea or Vomiting for  up to 20 doses. 20 tablet 0   • oxyCODONE-acetaminophen (PERCOCET) 5-325 MG per tablet 1 q 6 hrs prn pain 10 tablet 0   • [DISCONTINUED] silver sulfadiazine (SILVADENE, SSD) 1 % cream Apply  topically to the appropriate area as directed 2 (Two) Times a Day. 50 g 2     No current facility-administered medications on file prior to visit.         ALLERGIES:    Allergies   Allergen Reactions   • Erythromycin GI Intolerance   • Levaquin [Levofloxacin] GI Intolerance   • Penicillins GI Intolerance        Social History     Socioeconomic History   • Marital status:      Spouse name: Cruz   • Number of children: 0   • Years of education: Not on file   • Highest education level: Not on file   Occupational History   • Occupation:      Employer: SELF-EMPLOYED   Social Needs   • Financial resource strain: Not hard at all   • Food insecurity:     Worry: Never true     Inability: Never true   • Transportation needs:     Medical: No     Non-medical: No   Tobacco Use   • Smoking status: Never Smoker   • Smokeless tobacco: Never Used   Substance and Sexual Activity   • Alcohol use: Yes     Alcohol/week: 7.0 standard drinks     Types: 4 Glasses of wine, 3 Cans of beer per week     Frequency: 2-3 times a week     Drinks per session: 1 or 2     Binge frequency: Never     Comment: OCCASIONALLY   • Drug use: No   • Sexual activity: Not Currently     Partners: Male     Birth control/protection: Post-menopausal   Lifestyle   • Physical activity:     Days per week: 7 days     Minutes per session: 120 min   • Stress: Only a little        Family History   Problem Relation Age of Onset   • Lung cancer Father    • Cancer Mother    • Breast cancer Mother    • Liver disease Mother    • Breast cancer Sister 48   • Breast cancer Maternal Grandmother    • Breast cancer Paternal Grandmother    • Breast cancer Maternal Uncle    • Malig Hyperthermia Neg Hx         Review of Systems         General: no fever, no chills, no  fatigue,no weight changes, no lack of appetite.  Eyes: no epiphora, xerophthalmia,conjunctivitis, pain, glaucoma, blurred vision, blindness, secretion, photophobia, proptosis, diplopia.  Ears: no otorrhea, tinnitus, otorrhagia, deafness, pain, vertigo.  Nose: no rhinorrhea, no epistaxis, no alteration in perception of odors, no sinuses pressure.  Mouth: no alteration in gums or teeth,  No ulcers, no difficulty with mastication or deglut ion, no odynophagia.  Neck: no masses or pain, no thyroid alterations, no pain in muscles or arteries, no carotid odynia, no crepitation.  Respiratory: no cough, no sputum production,no dyspnea,no trepopnea, no pleuritic pain,no hemoptysis.  Heart: no syncope, no irregularity, no palpitations, no angina,no orthopnea,no paroxysmal nocturnal dyspnea.  Vascular Venous: no tenderness,no edema,no palpable cords,no postphlebitic syndrome, no skin changes no ulcerations.  Vascular Arterial: no distal ischemia, noclaudication, no gangrene, no neuropathic ischemic pain, no skin ulcers, no paleness no cyanosis.  GI: no dysphagia, no odynophagia, no regurgitation, no heartburn,no indigestion,no nausea,no vomiting,no hematemesis ,no melena,no jaundice,no distention, no obstipation,no enterorrhagia,no proctalgia,no anal  lesions, no changes in bowel habits.  : no frequency, no hesitancy, no hematuria, no discharge,no  pain.  Musculoskeletal: STATED muscle or tendon pain or inflammation,no  joint pain, no edema, no functional limitation,no fasciculations, no mass.  Neurologic: no headache, no seizures, noalterations on Craneal nerves, no motor deficit, no sensory deficit, normal coordination, no alteration in memory,normal orientation, calculation,normal writting, verbal and written language.  Skin: no rashes,no pruritus STATED localized lesions.  Psychiatric: no anxiety, no depression,no agitation, no delusions, proper insight.                  Objective     Vitals:    01/21/20 0911   BP:  "127/87   Pulse: 61   Resp: 12   Temp: 98.5 °F (36.9 °C)   TempSrc: Oral   SpO2: 98%   Weight: 73.8 kg (162 lb 12.8 oz)   Height: 163.5 cm (64.37\")   PainSc: 7  Comment: RT hip pain     Current Status 1/21/2020   ECOG score 0       Physical Exam   GENERAL:  Well-developed, well-nourished  Patient  in no acute distress.   SKIN:  Warm, dry ,NO rashes,NO purpura ,NO petechiae.  HEENT:  Pupils were equal and reactive to light and accomodation, conjunctivas non injected, no pterigion, normal extraocular movements, normal visual acuity.   Mouth mucosa was moist, no exudates in oropharynx, normal gum line, normal roof of the mouth and pillars, normal papillations of the tongue.  NECK:  Supple with good range of motion; no thyromegaly or masses, no JVD or bruits, no cervical adenopathies.No carotid arteries pain, no carotid abnormal pulsation , NO arterial dance.  LYMPHATICS:  No cervical, NO supraclavicular, NO axillary,NO epitrochlear , NO inguinal adenopathy.  CHEST:  Normal excursion of both luz thoraces, normal voice fremitus, no subcutaneous emphysema, normal axillas, no rashes or acanthosis nigricans. Lungs clear to percussion and auscultation, normal breath sounds bilaterally, no wheezing,NO crackles NO ronchi, NO stridor, NO rubs.  CARDIAC AND VASCULAR:  normal rate and regular rhythm, without murmurs,NO rubs NO S3 NO S4 right or left . Normal femoral, popliteal, pedis, brachial and carotid pulses.BILATERAL MASTECTOMIES AND RADIATION DERMATITIS ERYTHEMA NO BLISTER FORMATION  ABDOMEN:  Soft, nontender with no organomegaly or masses, no ascites, no collateral circulation,no distention,no [place] sign, no abdominal pain, no inguinal hernias,no umbilical hernia, no abdominal bruits. Normal bowel sounds.  GENITAL: Not  Performed.  EXTREMITIES  AND SPINE:  No clubbing, cyanosis or edema, no deformities or pain .No kyphosis, scoliosis, deformities or pain in spine, ribs or pelvic bone.  NEUROLOGICAL:  Patient was awake, " alert, oriented to time, person and place.                                                        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 At least for the last 4 years, she has had an in crescendo mass in the left breast that has produced a gigantic tumor that is now close to 25 cm in size and is replacing most of the anatomy of the left breast. There are areas of ulceration necrosis and bleeding and the mass is fixed to the chest wall. She has abundant amount of collateral circulation in the left anterior chest wall and it caught my attention the lack of any left axillary adenopathy. She has no lymphedema in the left upper extremity. In the right breast while in sitting position, no palpable abnormalities are documented. The right breast skin and nipple are normal. When the patient lies flat, there is a palpable mass at 9 o'clock from the nipple that measures probably 4 cm in size, very indistinct in regard to edges and margins. There was no mobility and no areas of tenderness. She has a giant adenopathy in the right axilla that measures close to 9 cm in size, uniform, no fluctuation, nontender, completely fixed to the axillary wall. There is no compromise of the skin.         Since the previous visit the patient has completed all of her plan of chemotherapy neoadjuvantly. She has had a very dramatic response  not only to the primary tumor in the left breast, her left breast remind ourselves was 3 times the normal size and had an ulcerated mass that was huge with bleeding and necrosis. This has resolved. She had no left axillary adenopathy but she had an induration in the right breast and most importantly she had almost a 7 cm mass in the right axilla. Since completion of chemotherapy all of these issues have resolved near completely. She has had residual mass behind the scar in the left breast that measures close to 4 cm, no left axillary adenopathy and near complete resolution of the right axillary adenopathy. No nodularity in the right breast.     On 10/15/2019 the patient completed several weeks ago her bilateral mastectomy and axillary lymph node dissection. She had no residual malignancy on any level in the chest wall. There is a tumor that had very dramatic regression in the left breast from almost 25 cm in diameter to 3.5, still positive left axillary lymph nodes. The size of them were smaller than originally thought. The right breast disclosed no primary tumor. She had a tumor located on the CT scan there before and clinically documented. The right axilla disclosed still positive lymph nodes, not surprising she had a large tumoral lesion right there. Now she has nothing left.         The patient was further reviewed on 01/21/2020. Two weeks ago she finally completed her radiation therapy after a lot of stopping and going because of radiation dermatitis. I wonder if this patient had an element of radiation recall induced by anthracycline. In any event, her blister formation is gone from the chest wall. She has a minor degree of erythema and sensitivity but there is no infection. There are no masses or nodularity.     From the point of view of her white count, hemoglobin and platelets these have returned to normality.     From the point of view of her Femara she remains on the medicine with no side effects of it.      From the point of view of visual changes, the patient has bilateral cataract that is more prominent on the left than on the right. I suggested ophthalmologist assessment.     I sent a note to Dr. Stuart, Orthopedic Medicine, in regard to this patient is clear from my point of view to proceed with her right hip replacement.     I am planning to see her back in 3 months with a CBC, CMP and CA 15-3.     I shared her laboratory parameters in regard to tumor markers in 12/2019 with her brother who was here with her today.     I find no need for radiological assessment of any nature at this time.    For her hypertension, I advised her to remain on her metoprolol.     For her previous history of TIA, I reminded her to remain on her aspirin.

## 2020-02-12 ENCOUNTER — TELEPHONE (OUTPATIENT)
Dept: ONCOLOGY | Facility: OTHER | Age: 60
End: 2020-02-12

## 2020-02-12 RX ORDER — OLOPATADINE HYDROCHLORIDE 1 MG/ML
1 SOLUTION/ DROPS OPHTHALMIC 2 TIMES DAILY PRN
Qty: 5 ML | Refills: 2 | Status: SHIPPED | OUTPATIENT
Start: 2020-02-12 | End: 2020-02-18

## 2020-02-12 RX ORDER — DICLOFENAC SODIUM 75 MG/1
75 TABLET, DELAYED RELEASE ORAL 2 TIMES DAILY
Qty: 60 TABLET | Refills: 2 | Status: SHIPPED | OUTPATIENT
Start: 2020-02-12 | End: 2020-03-03 | Stop reason: HOSPADM

## 2020-02-12 RX ORDER — LETROZOLE 2.5 MG/1
TABLET, FILM COATED ORAL
OUTPATIENT
Start: 2020-02-12

## 2020-02-12 RX ORDER — METOPROLOL SUCCINATE 25 MG/1
25 TABLET, EXTENDED RELEASE ORAL DAILY
Qty: 30 TABLET | Refills: 2 | Status: SHIPPED | OUTPATIENT
Start: 2020-02-12 | End: 2020-04-14 | Stop reason: SDUPTHER

## 2020-02-12 RX ORDER — LETROZOLE 2.5 MG/1
2.5 TABLET, FILM COATED ORAL DAILY
Qty: 30 TABLET | Refills: 2 | Status: SHIPPED | OUTPATIENT
Start: 2020-02-12 | End: 2020-06-11

## 2020-02-12 NOTE — TELEPHONE ENCOUNTER
Elie Dixon MD    Pt called and said her pharmacy has changed and she needs all her prescriptions called in from [facility] to [facility]     Please call pt back @ [phone]     Thanks

## 2020-02-12 NOTE — TELEPHONE ENCOUNTER
Medications reordered in refill encounter and sent to [facility] per patient request with enough refills until she sees Dr. Dixon again.  Pt v/u.

## 2020-02-18 ENCOUNTER — APPOINTMENT (OUTPATIENT)
Dept: PREADMISSION TESTING | Facility: HOSPITAL | Age: 60
End: 2020-02-18

## 2020-02-18 VITALS
WEIGHT: 159.19 LBS | DIASTOLIC BLOOD PRESSURE: 77 MMHG | BODY MASS INDEX: 25.58 KG/M2 | RESPIRATION RATE: 16 BRPM | SYSTOLIC BLOOD PRESSURE: 110 MMHG | HEIGHT: 66 IN | TEMPERATURE: 97.6 F | OXYGEN SATURATION: 100 % | HEART RATE: 73 BPM

## 2020-02-18 DIAGNOSIS — M16.11 ARTHRITIS OF RIGHT HIP: ICD-10-CM

## 2020-02-18 LAB
ANION GAP SERPL CALCULATED.3IONS-SCNC: 11.2 MMOL/L (ref 5–15)
BASOPHILS # BLD AUTO: 0.04 10*3/MM3 (ref 0–0.2)
BASOPHILS NFR BLD AUTO: 1.2 % (ref 0–1.5)
BILIRUB UR QL STRIP: NEGATIVE
BUN BLD-MCNC: 26 MG/DL (ref 8–23)
BUN/CREAT SERPL: 36.6 (ref 7–25)
CALCIUM SPEC-SCNC: 9.9 MG/DL (ref 8.6–10.5)
CHLORIDE SERPL-SCNC: 101 MMOL/L (ref 98–107)
CLARITY UR: CLEAR
CO2 SERPL-SCNC: 26.8 MMOL/L (ref 22–29)
COLOR UR: YELLOW
CREAT BLD-MCNC: 0.71 MG/DL (ref 0.57–1)
DEPRECATED RDW RBC AUTO: 47.8 FL (ref 37–54)
EOSINOPHIL # BLD AUTO: 0.07 10*3/MM3 (ref 0–0.4)
EOSINOPHIL NFR BLD AUTO: 2.1 % (ref 0.3–6.2)
ERYTHROCYTE [DISTWIDTH] IN BLOOD BY AUTOMATED COUNT: 13.9 % (ref 12.3–15.4)
GFR SERPL CREATININE-BSD FRML MDRD: 83 ML/MIN/1.73
GLUCOSE BLD-MCNC: 84 MG/DL (ref 65–99)
GLUCOSE UR STRIP-MCNC: NEGATIVE MG/DL
HCT VFR BLD AUTO: 40.7 % (ref 34–46.6)
HGB BLD-MCNC: 13.2 G/DL (ref 12–15.9)
HGB UR QL STRIP.AUTO: NEGATIVE
IMM GRANULOCYTES # BLD AUTO: 0 10*3/MM3 (ref 0–0.05)
IMM GRANULOCYTES NFR BLD AUTO: 0 % (ref 0–0.5)
KETONES UR QL STRIP: NEGATIVE
LEUKOCYTE ESTERASE UR QL STRIP.AUTO: NEGATIVE
LYMPHOCYTES # BLD AUTO: 0.64 10*3/MM3 (ref 0.7–3.1)
LYMPHOCYTES NFR BLD AUTO: 18.9 % (ref 19.6–45.3)
MCH RBC QN AUTO: 30 PG (ref 26.6–33)
MCHC RBC AUTO-ENTMCNC: 32.4 G/DL (ref 31.5–35.7)
MCV RBC AUTO: 92.5 FL (ref 79–97)
MONOCYTES # BLD AUTO: 0.34 10*3/MM3 (ref 0.1–0.9)
MONOCYTES NFR BLD AUTO: 10.1 % (ref 5–12)
NEUTROPHILS # BLD AUTO: 2.29 10*3/MM3 (ref 1.7–7)
NEUTROPHILS NFR BLD AUTO: 67.7 % (ref 42.7–76)
NITRITE UR QL STRIP: NEGATIVE
NRBC BLD AUTO-RTO: 0 /100 WBC (ref 0–0.2)
PH UR STRIP.AUTO: 5.5 [PH] (ref 5–8)
PLATELET # BLD AUTO: 230 10*3/MM3 (ref 140–450)
PMV BLD AUTO: 8.6 FL (ref 6–12)
POTASSIUM BLD-SCNC: 3.8 MMOL/L (ref 3.5–5.2)
PROT UR QL STRIP: NEGATIVE
RBC # BLD AUTO: 4.4 10*6/MM3 (ref 3.77–5.28)
SODIUM BLD-SCNC: 139 MMOL/L (ref 136–145)
SP GR UR STRIP: 1.02 (ref 1–1.03)
UROBILINOGEN UR QL STRIP: NORMAL
WBC NRBC COR # BLD: 3.38 10*3/MM3 (ref 3.4–10.8)

## 2020-02-18 PROCEDURE — 93005 ELECTROCARDIOGRAM TRACING: CPT

## 2020-02-18 PROCEDURE — 81003 URINALYSIS AUTO W/O SCOPE: CPT | Performed by: ORTHOPAEDIC SURGERY

## 2020-02-18 PROCEDURE — 36415 COLL VENOUS BLD VENIPUNCTURE: CPT

## 2020-02-18 PROCEDURE — 80048 BASIC METABOLIC PNL TOTAL CA: CPT | Performed by: ORTHOPAEDIC SURGERY

## 2020-02-18 PROCEDURE — 85025 COMPLETE CBC W/AUTO DIFF WBC: CPT | Performed by: ORTHOPAEDIC SURGERY

## 2020-02-18 PROCEDURE — 93010 ELECTROCARDIOGRAM REPORT: CPT | Performed by: INTERNAL MEDICINE

## 2020-02-18 ASSESSMENT — HOOS JR
HOOS JR SCORE: 15
HOOS JR SCORE: 43.335

## 2020-02-18 NOTE — DISCHARGE INSTRUCTIONS
Take the following medications the morning of surgery:    METOPROLOL    General Instructions:  • Do not eat solid food after midnight the night before surgery.  • You may drink clear liquids day of surgery but must stop at least one hour before your hospital arrival time @ 0930 AM STOP DRINKING!!!  • It is beneficial for you to have a clear drink that contains carbohydrates the day of surgery.  We suggest a 12 to 20 ounce bottle of Gatorade or Powerade for non-diabetic patients or a 12 to 20 ounce bottle of G2 or Powerade Zero for diabetic patients.     Clear liquids are liquids you can see through.  Nothing red in color.     Plain water                               Sports drinks  Sodas                                   Gelatin (Jell-O)  Fruit juices without pulp such as white grape juice and apple juice  Popsicles that contain no fruit or yogurt  Tea or coffee (no cream or milk added)  Gatorade / Powerade  G2 / Powerade Zero    • Patients who avoid smoking, chewing tobacco and alcohol for 4 weeks prior to surgery have a reduced risk of post-operative complications.  Quit smoking as many days before surgery as you can.  • Do not smoke, use chewing tobacco or drink alcohol the day of surgery.   • If applicable bring your C-PAP/ BI-PAP machine.  • Bring any papers given to you in the doctor’s office.  • Wear clean comfortable clothes.  • Do not wear contact lenses, false eyelashes or make-up.  Bring a case for your glasses.   • Bring crutches or walker if applicable.  • Remove all piercings.  Leave jewelry and any other valuables at home.  • Hair extensions with metal clips must be removed prior to surgery.  • The Pre-Admission Testing nurse will instruct you to bring medications if unable to obtain an accurate list in Pre-Admission Testing.        If you were given a blood bank ID arm band remember to bring it with you the day of surgery.    Preventing a Surgical Site Infection:  • For 2 to 3 days before surgery,  avoid shaving with a razor because the razor can irritate skin and make it easier to develop an infection.    • Any areas of open skin can increase the risk of a post-operative wound infection by allowing bacteria to enter and travel throughout the body.  Notify your surgeon if you have any skin wounds / rashes even if it is not near the expected surgical site.  The area will need assessed to determine if surgery should be delayed until it is healed.  • The night prior to surgery sleep in a clean bed with clean clothing.  Do not allow pets to sleep with you.  • Shower on the morning of surgery using a fresh bar of anti-bacterial soap (such as Dial) and clean washcloth.  Dry with a clean towel and dress in clean clothing.  • Ask your surgeon if you will be receiving antibiotics prior to surgery.  • Make sure you, your family, and all healthcare providers clean their hands with soap and water or an alcohol based hand  before caring for you or your wound.    Day of surgery:03- ARRIVE @ 1030 AM REPORT TO THE MAIN OR   Your arrival time is approximately two hours before your scheduled surgery time.  Upon arrival, a Pre-op nurse and Anesthesiologist will review your health history, obtain vital signs, and answer questions you may have.  The only belongings needed at this time will be a list of your home medications and if applicable your C-PAP/BI-PAP machine.  If you are staying overnight your family can leave the rest of your belongings in the car and bring them to your room later.  A Pre-op nurse will start an IV and you may receive medication in preparation for surgery, including something to help you relax.  Your family will be able to see you in the Pre-op area.  Two visitors at a time will be allowed in the Pre-op room.  While you are in surgery your family should notify the waiting room  if they leave the waiting room area and provide a contact phone number.    Please be aware that  surgery does come with discomfort.  We want to make every effort to control your discomfort so please discuss any uncontrolled symptoms with your nurse.   Your doctor will most likely have prescribed pain medications.      If you are going home after surgery you will receive individualized written care instructions before being discharged.  A responsible adult must drive you to and from the hospital on the day of your surgery and stay with you for 24 hours.    If you are staying overnight following surgery, you will be transported to your hospital room following the recovery period.  [facility] has all private rooms.    If you have any questions please call Pre-Admission Testing at [phone].  Deductibles and co-payments are collected on the day of service. Please be prepared to pay the required co-pay, deductible or deposit on the day of service as defined by your plan.  2% CHLORHEXIDINE GLUCONATE* CLOTH  Preparing or “prepping” skin before surgery can reduce the risk of infection at the surgical site. To make the process easier, [facility] has chosen disposable cloths moistened with a rinse-free, 2% Chlorhexidine Gluconate (CHG) antiseptic solution. The steps below outline the prepping process and should be carefully followed.        Use the prep cloth on the area that is circled in the diagram             Directions Night before Surgery 03- PM PRIOR TO SURGERY   1) Shower using a fresh bar of anti-bacterial soap (such as Dial) and clean washcloth.  Use a clean towel to completely dry your skin.  2) Do not use any lotions, oils or creams on your skin.  3) Open the package and remove 1 cloth, wipe your skin for 30 seconds in a circular motion.  Allow to dry for 3 minutes.  4) Repeat #3 with second cloth.  5) Do not touch your eyes, ears, or mouth with the prep cloth.  6) Allow the wet prep solution to air dry.  7) Discard the prep cloth and wash your hands with soap and  water.   8) Dress in clean bed clothes and sleep on fresh clean bed sheets.   9) You may experience some temporary itching after the prep.    Directions Day of Surgery 03- AM PRIOR TO SURGERY   1) Repeat steps 1,2,3,4,5,6,7, and 9.   2) Dress in clean clothes before coming to the hospital.    BACTROBAN NASAL OINTMENT  There are many germs normally in your nose. Bactroban is an ointment that will help reduce these germs. Please follow these instructions for Bactroban use:      ____The day before surgery in the morning  [id number]_______    ____The day before surgery in the evening              [id number]_______    ____The day of surgery in the morning    [id number]_______    **Squirt ½ package of Bactroban Ointment onto a cotton applicator and apply to inside of 1st nostril.  Squirt the remaining Bactroban and apply to the inside of the other nostril.

## 2020-02-24 ENCOUNTER — OFFICE VISIT (OUTPATIENT)
Dept: ORTHOPEDIC SURGERY | Facility: CLINIC | Age: 60
End: 2020-02-24

## 2020-02-24 VITALS — WEIGHT: 159.8 LBS | HEIGHT: 64 IN | TEMPERATURE: 98.2 F | BODY MASS INDEX: 27.28 KG/M2

## 2020-02-24 DIAGNOSIS — M16.11 ARTHRITIS OF RIGHT HIP: Primary | ICD-10-CM

## 2020-02-24 PROCEDURE — 99213 OFFICE O/P EST LOW 20 MIN: CPT | Performed by: NURSE PRACTITIONER

## 2020-02-24 NOTE — PROGRESS NOTES
"   History & Physical       Patient: Marylu Georges  YOB: 1960  Medical Record Number: [id number]  Wt Readings from Last 3 Encounters:   02/24/20 72.5 kg (159 lb 12.8 oz)   02/18/20 72.2 kg (159 lb 3 oz)   01/21/20 73.8 kg (162 lb 12.8 oz)     Ht Readings from Last 3 Encounters:   02/24/20 162.6 cm (64\")   02/18/20 167.6 cm (66\")   01/21/20 163.5 cm (64.37\")     Body mass index is 27.43 kg/m².  Facility age limit for growth percentiles is 20 years.    Surgeon:  Dr. Luis M Leonard    Chief Complaints:   Chief Complaint   Patient presents with   • Right Hip - Pain, Pre-op Exam     Surgery:      Subjective:    History of Present Illness: 60 y.o. female presents with   Chief Complaint   Patient presents with   • Right Hip - Pain, Pre-op Exam   . Chronic symptoms have been progressively worsening despite more conservative treatment measures including medications OTC and prescription, cortisone injections and or gel injections, and physical therapy.  Symptoms are associated with ability to move, exercise, and perform activities of daily living.  Symptoms are aggravated by weight bearing and ROM necessary for activities of daily living.   Symptoms improve with rest, ice and elevation only minimally.      Allergies:   Allergies   Allergen Reactions   • Benadryl [Diphenhydramine] Itching and Other (See Comments)     INCREASES BLOOD PRESSURE   • Erythromycin GI Intolerance   • Levaquin [Levofloxacin] GI Intolerance   • Penicillins GI Intolerance       Medications:   Home Medications:  Current Outpatient Medications on File Prior to Visit   Medication Sig   • acetaminophen (TYLENOL) 500 MG tablet Take 500 mg by mouth Every 6 (Six) Hours As Needed for Mild Pain .   • letrozole (FEMARA) 2.5 MG tablet Take 1 tablet by mouth Daily.   • metoprolol succinate XL (TOPROL XL) 25 MG 24 hr tablet Take 1 tablet by mouth Daily.   • aspirin 81 MG EC tablet Take 1 tablet by mouth Daily. (Patient taking differently: Take 81 " mg by mouth Daily. HOLD PRIOR TO SURGERY 02-)   • CHLORHEXIDINE GLUCONATE CLOTH EX Apply  topically. AS DIRECTED:  PM DAY BEFORE SURGERY  AM DAY OF SURGERY   • diclofenac (VOLTAREN) 75 MG EC tablet Take 1 tablet by mouth 2 (Two) Times a Day. (Patient taking differently: Take 75 mg by mouth 2 (Two) Times a Day. HOLD PRIOR TO SURGERY 02-)   • IBUPROFEN PO Take 200 mg by mouth Every 4 (Four) Hours As Needed. HOLD PRIOR TO SURGERY PER DR. KYLE,   • mupirocin (BACTROBAN) 2 % nasal ointment into the nostril(s) as directed by provider. AS DIRECTED:  AM & PM DAY BEFORE SURGERY  AM DAY OF SURGERY   • oxyCODONE-acetaminophen (PERCOCET) 5-325 MG per tablet 1 q 6 hrs prn pain     No current facility-administered medications on file prior to visit.      Current Medications:  Scheduled Meds:  Continuous Infusions:  No current facility-administered medications for this visit.   PRN Meds:.    I have reviewed the patient's medical history in detail and updated the computerized patient record.  Review and summarization of old records include:    Past Medical History:   Diagnosis Date   • Anemia in neoplastic disease    • Arthritis    • Breast cancer (CMS/HCC)     Left   • CVA (cerebral vascular accident) (CMS/HCC)    • Hip pain     RIGHT HIP... CYST   • History of fracture of leg 1987   • History of radiation therapy     LAST TREATMENT     • Hypertension    • Limited joint range of motion (ROM)     RIGHT HIP   • Skin sore     OPEN SORE LEFT BREAST   • Syncope    • Vertigo         Past Surgical History:   Procedure Laterality Date   • AXILLARY LYMPH NODE BIOPSY/EXCISION Right     LYMPH NODE UNDER RIGHT ARM-MALIGNANT (DOUBLE MASTECTOMY)   • BREAST BIOPSY Left     MALIGNANT   • FRACTURE SURGERY  1987    Leg   • HARDWARE REMOVAL     • MASTECTOMY W/ SENTINEL NODE BIOPSY Bilateral 9/16/2019    Procedure: BILATERLA MODIFIED RADICAL MASTECTOMY WITH BILATERAL SENTINEL LYMPH NODE BIOPSY;  Surgeon: Joby Barron Jr., MD;   Location: [facility] MAIN OR;  Service: General   • VENOUS ACCESS DEVICE (PORT) INSERTION Right 2/1/2019    Procedure: INSERTION VENOUS ACCESS DEVICE;  Surgeon: Joby Barron Jr., MD;  Location: [facility] OR OSC;  Service: General   • VENOUS ACCESS DEVICE (PORT) REMOVAL N/A 10/30/2019    Procedure: Mediport Removal;  Surgeon: Joby Barron Jr., MD;  Location: [facility] MAIN OR;  Service: General        Social History     Occupational History   • Occupation:      Employer: SELF-EMPLOYED   Tobacco Use   • Smoking status: Never Smoker   • Smokeless tobacco: Never Used   Substance and Sexual Activity   • Alcohol use: Not Currently     Alcohol/week: 7.0 standard drinks     Types: 4 Glasses of wine, 3 Cans of beer per week     Frequency: 2-3 times a week     Drinks per session: 1 or 2     Binge frequency: Never   • Drug use: No   • Sexual activity: Not Currently     Partners: Male     Birth control/protection: Post-menopausal    Social History     Social History Narrative   • Not on file        Family History   Problem Relation Age of Onset   • Lung cancer Father    • Cancer Mother    • Breast cancer Mother    • Liver disease Mother    • Breast cancer Sister 48   • Breast cancer Maternal Grandmother    • Breast cancer Paternal Grandmother    • Breast cancer Maternal Uncle    • Malig Hyperthermia Neg Hx        ROS: 14 point review of systems was performed and was negative except for documented findings in HPI and today's encounter.     Allergies:   Allergies   Allergen Reactions   • Benadryl [Diphenhydramine] Itching and Other (See Comments)     INCREASES BLOOD PRESSURE   • Erythromycin GI Intolerance   • Levaquin [Levofloxacin] GI Intolerance   • Penicillins GI Intolerance     Constitutional:  Denies fever, shaking or chills   Eyes:  Denies change in visual acuity   HENT:  Denies nasal congestion or sore throat   Respiratory:  Denies cough or shortness of breath   Cardiovascular:  Denies chest pain or severe LE  "edema   GI:  Denies abdominal pain, nausea, vomiting, bloody stools or diarrhea   Musculoskeletal:  Denies numbness tingling or loss of motor function except as outlined above in history of present illness.  : Denies painful urination or hematuria  Integument:  Denies rash, lesion or ulceration   Neurologic:  Denies headache or focal weakness  Endocrine:  Denies lymphadenopathy  Psych:  Denies confusion or change in mental status   Hem:  Denies active bleeding    Physical Exam: 60 y.o. female  Wt Readings from Last 3 Encounters:   02/24/20 72.5 kg (159 lb 12.8 oz)   02/18/20 72.2 kg (159 lb 3 oz)   01/21/20 73.8 kg (162 lb 12.8 oz)     Ht Readings from Last 3 Encounters:   02/24/20 162.6 cm (64\")   02/18/20 167.6 cm (66\")   01/21/20 163.5 cm (64.37\")     Body mass index is 27.43 kg/m².  Facility age limit for growth percentiles is 20 years.  Vitals:    02/24/20 0829   Temp: 98.2 °F (36.8 °C)       Vital signs reviewed.   General Appearance:    Alert, cooperative, in no acute distress                  Eyes: conjunctiva clear  ENT: external ears and nose atraumatic  CV: no peripheral edema  Resp: normal respiratory effort  Skin: no rashes or wounds; normal turgor  Psych: mood and affect appropriate  Lymph: no nodes appreciated  Neuro: gross sensation intact  Vascular:  Palpable peripheral pulse in noted extremity  Musculoskeletal Extremities: HIP Exam: antalgic and stiff-legged gait without assistive device right hip, Stinchfield postitive, stiffness, LUCIANO test positive and guteal pain 2+ pedal pulses and brisk capillary refill Pedal edema none          Diagnostic Tests:  Appointment on 02/18/2020   Component Date Value Ref Range Status   • Glucose 02/18/2020 84  65 - 99 mg/dL Final   • BUN 02/18/2020 26* 8 - 23 mg/dL Final   • Creatinine 02/18/2020 0.71  0.57 - 1.00 mg/dL Final   • Sodium 02/18/2020 139  136 - 145 mmol/L Final   • Potassium 02/18/2020 3.8  3.5 - 5.2 mmol/L Final   • Chloride 02/18/2020 101  98 - " 107 mmol/L Final   • CO2 02/18/2020 26.8  22.0 - 29.0 mmol/L Final   • Calcium 02/18/2020 9.9  8.6 - 10.5 mg/dL Final   • eGFR Non African Amer 02/18/2020 83  >60 mL/min/1.73 Final   • BUN/Creatinine Ratio 02/18/2020 36.6* 7.0 - 25.0 Final   • Anion Gap 02/18/2020 11.2  5.0 - 15.0 mmol/L Final   • Color, UA 02/18/2020 Yellow  Yellow, Straw Final   • Appearance, UA 02/18/2020 Clear  Clear Final   • pH, UA 02/18/2020 5.5  5.0 - 8.0 Final   • Specific Gravity, UA 02/18/2020 1.025  1.005 - 1.030 Final   • Glucose, UA 02/18/2020 Negative  Negative Final   • Ketones, UA 02/18/2020 Negative  Negative Final   • Bilirubin, UA 02/18/2020 Negative  Negative Final   • Blood, UA 02/18/2020 Negative  Negative Final   • Protein, UA 02/18/2020 Negative  Negative Final   • Leuk Esterase, UA 02/18/2020 Negative  Negative Final   • Nitrite, UA 02/18/2020 Negative  Negative Final   • Urobilinogen, UA 02/18/2020 0.2 E.U./dL  0.2 - 1.0 E.U./dL Final   • WBC 02/18/2020 3.38* 3.40 - 10.80 10*3/mm3 Final   • RBC 02/18/2020 4.40  3.77 - 5.28 10*6/mm3 Final   • Hemoglobin 02/18/2020 13.2  12.0 - 15.9 g/dL Final   • Hematocrit 02/18/2020 40.7  34.0 - 46.6 % Final   • MCV 02/18/2020 92.5  79.0 - 97.0 fL Final   • MCH 02/18/2020 30.0  26.6 - 33.0 pg Final   • MCHC 02/18/2020 32.4  31.5 - 35.7 g/dL Final   • RDW 02/18/2020 13.9  12.3 - 15.4 % Final   • RDW-SD 02/18/2020 47.8  37.0 - 54.0 fl Final   • MPV 02/18/2020 8.6  6.0 - 12.0 fL Final   • Platelets 02/18/2020 230  140 - 450 10*3/mm3 Final   • Neutrophil % 02/18/2020 67.7  42.7 - 76.0 % Final   • Lymphocyte % 02/18/2020 18.9* 19.6 - 45.3 % Final   • Monocyte % 02/18/2020 10.1  5.0 - 12.0 % Final   • Eosinophil % 02/18/2020 2.1  0.3 - 6.2 % Final   • Basophil % 02/18/2020 1.2  0.0 - 1.5 % Final   • Immature Grans % 02/18/2020 0.0  0.0 - 0.5 % Final   • Neutrophils, Absolute 02/18/2020 2.29  1.70 - 7.00 10*3/mm3 Final   • Lymphocytes, Absolute 02/18/2020 0.64* 0.70 - 3.10 10*3/mm3 Final   •  Monocytes, Absolute 02/18/2020 0.34  0.10 - 0.90 10*3/mm3 Final   • Eosinophils, Absolute 02/18/2020 0.07  0.00 - 0.40 10*3/mm3 Final   • Basophils, Absolute 02/18/2020 0.04  0.00 - 0.20 10*3/mm3 Final   • Immature Grans, Absolute 02/18/2020 0.00  0.00 - 0.05 10*3/mm3 Final   • nRBC 02/18/2020 0.0  0.0 - 0.2 /100 WBC Final       Imaging was done previously in the office, images were personally viewed, viewed images and discussed with the patient:    Indication: pain related symptoms,  Views: 2V AP&LAT right hip(s)   Findings: severe end-stage arthritis (bone on bone, subchondral sclerosis/cysts, osteophytes)  Comparison views: viewed last xray done in the office.     Assessment:  Patient Active Problem List   Diagnosis   • Malignant neoplasm of overlapping sites of left breast in female, estrogen receptor positive (CMS/HCC)   • Anemia in neoplastic disease   • Axillary mass, right   • Family history of breast cancer in female   • Syncope   • Malignant neoplasm of overlapping sites of both breasts in female, estrogen receptor positive (CMS/HCC)   • Essential hypertension   • Acute radiation dermatitis   • Arthritis of right hip       Plan:  Reviewed anatomy of a total joint arthroplasty in laymen's terms, as well as typical postoperative recovery and possibly 6-12 months for maximal recovery, and possible need for rehabilitation stay after hospitalization. We also discussed risks, benefits, alternatives, and limitations of procedure with risks including but not limited to neurovascular damage, bleeding, infection, malalignment, chronic pian, failure of implants, osteolysis, loosening of implants, loss of motion, weakness, stiffness, instability, DVT, pulmonary embolus, death, stroke, complex regional pain syndrome, myocardial infarction, and need for additional procedures. Concept of substitution vs. replacement discussed.  No guarantees were given regarding results of surgery.      Marylu Georges was given the  "opportunity to ask and have all questions answered today.  The patient voiced understanding of the risks, benefits, and alternative forms of treatment that were discussed and the patient consents to proceed with surgery.     Patient's blood clot history is positive see comments below.  Planned DVT prophylaxis for surgery:  eliquis , has percocet at home but it makes her loopy so try norco.     Discharge Plan: POD 2-3 to home and home health, 3/2/20 Right SONA  Eliquis  gluc84/gfr83/hgb13.2  finished treatment for breast cancer including chemo and radiation which she is just finishing now, Dr. Dixon cleared for sx.  sees Dr. Dixon and said he in fact serves as her primary care.  Is a history of \"cerebrovascular accident\" in her history but she says she never had symptoms, had full cardiac work up at that time and no issues found.     Date: 2/24/2020  Rosaura Zamorano, APRN        "

## 2020-03-02 ENCOUNTER — ANESTHESIA (OUTPATIENT)
Dept: PERIOP | Facility: HOSPITAL | Age: 60
End: 2020-03-02

## 2020-03-02 ENCOUNTER — ANESTHESIA EVENT (OUTPATIENT)
Dept: PERIOP | Facility: HOSPITAL | Age: 60
End: 2020-03-02

## 2020-03-02 ENCOUNTER — HOSPITAL ENCOUNTER (OUTPATIENT)
Facility: HOSPITAL | Age: 60
Discharge: HOME HEALTH | End: 2020-03-03
Attending: ORTHOPAEDIC SURGERY | Admitting: ORTHOPAEDIC SURGERY

## 2020-03-02 ENCOUNTER — APPOINTMENT (OUTPATIENT)
Dept: GENERAL RADIOLOGY | Facility: HOSPITAL | Age: 60
End: 2020-03-02

## 2020-03-02 DIAGNOSIS — M16.11 ARTHRITIS OF RIGHT HIP: ICD-10-CM

## 2020-03-02 PROCEDURE — G0378 HOSPITAL OBSERVATION PER HR: HCPCS

## 2020-03-02 PROCEDURE — 25010000002 PHENYLEPHRINE PER 1 ML: Performed by: NURSE ANESTHETIST, CERTIFIED REGISTERED

## 2020-03-02 PROCEDURE — 25010000002 VANCOMYCIN PER 500 MG: Performed by: ORTHOPAEDIC SURGERY

## 2020-03-02 PROCEDURE — 25010000002 FENTANYL CITRATE (PF) 100 MCG/2ML SOLUTION: Performed by: NURSE ANESTHETIST, CERTIFIED REGISTERED

## 2020-03-02 PROCEDURE — 25010000003 MEPERIDINE PER 100 MG: Performed by: ANESTHESIOLOGY

## 2020-03-02 PROCEDURE — 27130 TOTAL HIP ARTHROPLASTY: CPT | Performed by: NURSE PRACTITIONER

## 2020-03-02 PROCEDURE — 25010000002 PROPOFOL 10 MG/ML EMULSION: Performed by: NURSE ANESTHETIST, CERTIFIED REGISTERED

## 2020-03-02 PROCEDURE — 25010000002 EPINEPHRINE 30 MG/30ML SOLUTION 30 ML VIAL: Performed by: ORTHOPAEDIC SURGERY

## 2020-03-02 PROCEDURE — 25010000002 VANCOMYCIN PER 500 MG: Performed by: NURSE PRACTITIONER

## 2020-03-02 PROCEDURE — 25010000002 ROPIVACAINE PER 1 MG: Performed by: ORTHOPAEDIC SURGERY

## 2020-03-02 PROCEDURE — 25010000002 NEOSTIGMINE PER 0.5 MG: Performed by: NURSE ANESTHETIST, CERTIFIED REGISTERED

## 2020-03-02 PROCEDURE — 25010000002 KETOROLAC TROMETHAMINE PER 15 MG: Performed by: ORTHOPAEDIC SURGERY

## 2020-03-02 PROCEDURE — 25010000003 CEFAZOLIN IN DEXTROSE 2-4 GM/100ML-% SOLUTION: Performed by: ORTHOPAEDIC SURGERY

## 2020-03-02 PROCEDURE — C1776 JOINT DEVICE (IMPLANTABLE): HCPCS | Performed by: ORTHOPAEDIC SURGERY

## 2020-03-02 PROCEDURE — 73501 X-RAY EXAM HIP UNI 1 VIEW: CPT

## 2020-03-02 PROCEDURE — 27130 TOTAL HIP ARTHROPLASTY: CPT | Performed by: ORTHOPAEDIC SURGERY

## 2020-03-02 PROCEDURE — 25010000002 ONDANSETRON PER 1 MG: Performed by: NURSE ANESTHETIST, CERTIFIED REGISTERED

## 2020-03-02 PROCEDURE — 25010000002 HYDROMORPHONE PER 4 MG: Performed by: NURSE ANESTHETIST, CERTIFIED REGISTERED

## 2020-03-02 PROCEDURE — 20985 CPTR-ASST DIR MS PX: CPT | Performed by: ORTHOPAEDIC SURGERY

## 2020-03-02 PROCEDURE — 25010000002 PROMETHAZINE PER 50 MG: Performed by: NURSE ANESTHETIST, CERTIFIED REGISTERED

## 2020-03-02 PROCEDURE — 25010000002 CLONIDINE PER 1 MG: Performed by: ORTHOPAEDIC SURGERY

## 2020-03-02 PROCEDURE — 25010000002 MIDAZOLAM PER 1 MG: Performed by: ANESTHESIOLOGY

## 2020-03-02 DEVICE — TOTL HIP COP DEPUY 9641334: Type: IMPLANTABLE DEVICE | Site: HIP | Status: FUNCTIONAL

## 2020-03-02 DEVICE — TRI-LOCK BPS FEMORAL STEM 12/14 TAPER TRI-LOCK BPS W/GRIPTION SIZE 7 STD 109MM
Type: IMPLANTABLE DEVICE | Site: HIP | Status: FUNCTIONAL
Brand: TRI-LOCK GRIPTION

## 2020-03-02 DEVICE — PINNACLE GRIPTION ACETABULAR SHELL SECTOR 50MM OD
Type: IMPLANTABLE DEVICE | Site: HIP | Status: FUNCTIONAL
Brand: PINNACLE GRIPTION

## 2020-03-02 DEVICE — BIOLOX DELTA CERAMIC FEMORAL HEAD 32MM DIA +1 12/14 TAPER
Type: IMPLANTABLE DEVICE | Site: HIP | Status: FUNCTIONAL
Brand: BIOLOX DELTA

## 2020-03-02 DEVICE — PINNACLE HIP SOLUTIONS ALTRX POLYETHYLENE ACETABULAR LINER +4 10 DEGREES 32MM ID 50MM OD
Type: IMPLANTABLE DEVICE | Site: HIP | Status: FUNCTIONAL
Brand: PINNACLE ALTRX

## 2020-03-02 RX ORDER — HYDROCODONE BITARTRATE AND ACETAMINOPHEN 7.5; 325 MG/1; MG/1
1 TABLET ORAL ONCE AS NEEDED
Status: DISCONTINUED | OUTPATIENT
Start: 2020-03-02 | End: 2020-03-02 | Stop reason: HOSPADM

## 2020-03-02 RX ORDER — PROMETHAZINE HYDROCHLORIDE 25 MG/1
25 TABLET ORAL ONCE AS NEEDED
Status: DISCONTINUED | OUTPATIENT
Start: 2020-03-02 | End: 2020-03-02 | Stop reason: HOSPADM

## 2020-03-02 RX ORDER — PROMETHAZINE HYDROCHLORIDE 25 MG/ML
12.5 INJECTION, SOLUTION INTRAMUSCULAR; INTRAVENOUS ONCE AS NEEDED
Status: DISCONTINUED | OUTPATIENT
Start: 2020-03-02 | End: 2020-03-02 | Stop reason: HOSPADM

## 2020-03-02 RX ORDER — LETROZOLE 2.5 MG/1
2.5 TABLET, FILM COATED ORAL DAILY
Status: DISCONTINUED | OUTPATIENT
Start: 2020-03-02 | End: 2020-03-03 | Stop reason: HOSPADM

## 2020-03-02 RX ORDER — HYDROCODONE BITARTRATE AND ACETAMINOPHEN 7.5; 325 MG/1; MG/1
1 TABLET ORAL EVERY 4 HOURS PRN
Status: DISCONTINUED | OUTPATIENT
Start: 2020-03-02 | End: 2020-03-03 | Stop reason: HOSPADM

## 2020-03-02 RX ORDER — ACETAMINOPHEN 325 MG/1
650 TABLET ORAL ONCE AS NEEDED
Status: DISCONTINUED | OUTPATIENT
Start: 2020-03-02 | End: 2020-03-02 | Stop reason: HOSPADM

## 2020-03-02 RX ORDER — LIDOCAINE HYDROCHLORIDE 10 MG/ML
0.5 INJECTION, SOLUTION EPIDURAL; INFILTRATION; INTRACAUDAL; PERINEURAL ONCE AS NEEDED
Status: DISCONTINUED | OUTPATIENT
Start: 2020-03-02 | End: 2020-03-02 | Stop reason: HOSPADM

## 2020-03-02 RX ORDER — BISACODYL 5 MG/1
10 TABLET, DELAYED RELEASE ORAL DAILY PRN
Status: DISCONTINUED | OUTPATIENT
Start: 2020-03-02 | End: 2020-03-03 | Stop reason: HOSPADM

## 2020-03-02 RX ORDER — FLUMAZENIL 0.1 MG/ML
0.2 INJECTION INTRAVENOUS AS NEEDED
Status: DISCONTINUED | OUTPATIENT
Start: 2020-03-02 | End: 2020-03-02 | Stop reason: HOSPADM

## 2020-03-02 RX ORDER — FENTANYL CITRATE 50 UG/ML
INJECTION, SOLUTION INTRAMUSCULAR; INTRAVENOUS AS NEEDED
Status: DISCONTINUED | OUTPATIENT
Start: 2020-03-02 | End: 2020-03-02 | Stop reason: SURG

## 2020-03-02 RX ORDER — ONDANSETRON 2 MG/ML
4 INJECTION INTRAMUSCULAR; INTRAVENOUS ONCE AS NEEDED
Status: COMPLETED | OUTPATIENT
Start: 2020-03-02 | End: 2020-03-02

## 2020-03-02 RX ORDER — MAGNESIUM HYDROXIDE 1200 MG/15ML
LIQUID ORAL AS NEEDED
Status: DISCONTINUED | OUTPATIENT
Start: 2020-03-02 | End: 2020-03-02 | Stop reason: HOSPADM

## 2020-03-02 RX ORDER — HYDROMORPHONE HYDROCHLORIDE 1 MG/ML
0.5 INJECTION, SOLUTION INTRAMUSCULAR; INTRAVENOUS; SUBCUTANEOUS
Status: DISCONTINUED | OUTPATIENT
Start: 2020-03-02 | End: 2020-03-03 | Stop reason: HOSPADM

## 2020-03-02 RX ORDER — PROMETHAZINE HYDROCHLORIDE 25 MG/ML
6.25 INJECTION, SOLUTION INTRAMUSCULAR; INTRAVENOUS
Status: DISCONTINUED | OUTPATIENT
Start: 2020-03-02 | End: 2020-03-02 | Stop reason: HOSPADM

## 2020-03-02 RX ORDER — PROMETHAZINE HYDROCHLORIDE 25 MG/1
25 SUPPOSITORY RECTAL ONCE AS NEEDED
Status: DISCONTINUED | OUTPATIENT
Start: 2020-03-02 | End: 2020-03-02 | Stop reason: HOSPADM

## 2020-03-02 RX ORDER — FENTANYL CITRATE 50 UG/ML
50 INJECTION, SOLUTION INTRAMUSCULAR; INTRAVENOUS
Status: DISCONTINUED | OUTPATIENT
Start: 2020-03-02 | End: 2020-03-02 | Stop reason: HOSPADM

## 2020-03-02 RX ORDER — ONDANSETRON 4 MG/1
4 TABLET, FILM COATED ORAL EVERY 6 HOURS PRN
Status: DISCONTINUED | OUTPATIENT
Start: 2020-03-02 | End: 2020-03-03 | Stop reason: HOSPADM

## 2020-03-02 RX ORDER — MELATONIN
1000 DAILY
COMMUNITY
End: 2020-03-03 | Stop reason: HOSPADM

## 2020-03-02 RX ORDER — NALOXONE HCL 0.4 MG/ML
0.1 VIAL (ML) INJECTION
Status: DISCONTINUED | OUTPATIENT
Start: 2020-03-02 | End: 2020-03-03 | Stop reason: HOSPADM

## 2020-03-02 RX ORDER — CEFAZOLIN SODIUM 2 G/100ML
2 INJECTION, SOLUTION INTRAVENOUS ONCE
Status: COMPLETED | OUTPATIENT
Start: 2020-03-02 | End: 2020-03-02

## 2020-03-02 RX ORDER — HYDROMORPHONE HYDROCHLORIDE 1 MG/ML
0.5 INJECTION, SOLUTION INTRAMUSCULAR; INTRAVENOUS; SUBCUTANEOUS
Status: DISCONTINUED | OUTPATIENT
Start: 2020-03-02 | End: 2020-03-02 | Stop reason: HOSPADM

## 2020-03-02 RX ORDER — MECLIZINE HCL 12.5 MG/1
12.5 TABLET ORAL 3 TIMES DAILY PRN
Status: DISCONTINUED | OUTPATIENT
Start: 2020-03-02 | End: 2020-03-03 | Stop reason: HOSPADM

## 2020-03-02 RX ORDER — HYDRALAZINE HYDROCHLORIDE 20 MG/ML
5 INJECTION INTRAMUSCULAR; INTRAVENOUS
Status: DISCONTINUED | OUTPATIENT
Start: 2020-03-02 | End: 2020-03-02 | Stop reason: HOSPADM

## 2020-03-02 RX ORDER — PROMETHAZINE HYDROCHLORIDE 12.5 MG/1
12.5 TABLET ORAL EVERY 6 HOURS PRN
Status: DISCONTINUED | OUTPATIENT
Start: 2020-03-02 | End: 2020-03-03 | Stop reason: HOSPADM

## 2020-03-02 RX ORDER — PROMETHAZINE HYDROCHLORIDE 25 MG/ML
12.5 INJECTION, SOLUTION INTRAMUSCULAR; INTRAVENOUS EVERY 6 HOURS PRN
Status: DISCONTINUED | OUTPATIENT
Start: 2020-03-02 | End: 2020-03-03 | Stop reason: HOSPADM

## 2020-03-02 RX ORDER — SODIUM CHLORIDE 9 MG/ML
INJECTION, SOLUTION INTRAVENOUS AS NEEDED
Status: DISCONTINUED | OUTPATIENT
Start: 2020-03-02 | End: 2020-03-02 | Stop reason: HOSPADM

## 2020-03-02 RX ORDER — PROPOFOL 10 MG/ML
VIAL (ML) INTRAVENOUS AS NEEDED
Status: DISCONTINUED | OUTPATIENT
Start: 2020-03-02 | End: 2020-03-02 | Stop reason: SURG

## 2020-03-02 RX ORDER — ROCURONIUM BROMIDE 10 MG/ML
INJECTION, SOLUTION INTRAVENOUS AS NEEDED
Status: DISCONTINUED | OUTPATIENT
Start: 2020-03-02 | End: 2020-03-02 | Stop reason: SURG

## 2020-03-02 RX ORDER — SODIUM CHLORIDE, SODIUM LACTATE, POTASSIUM CHLORIDE, CALCIUM CHLORIDE 600; 310; 30; 20 MG/100ML; MG/100ML; MG/100ML; MG/100ML
100 INJECTION, SOLUTION INTRAVENOUS CONTINUOUS
Status: DISCONTINUED | OUTPATIENT
Start: 2020-03-02 | End: 2020-03-03 | Stop reason: HOSPADM

## 2020-03-02 RX ORDER — VANCOMYCIN HYDROCHLORIDE 1 G/200ML
15 INJECTION, SOLUTION INTRAVENOUS ONCE
Status: COMPLETED | OUTPATIENT
Start: 2020-03-02 | End: 2020-03-03

## 2020-03-02 RX ORDER — OXYCODONE AND ACETAMINOPHEN 7.5; 325 MG/1; MG/1
1 TABLET ORAL ONCE AS NEEDED
Status: DISCONTINUED | OUTPATIENT
Start: 2020-03-02 | End: 2020-03-02 | Stop reason: HOSPADM

## 2020-03-02 RX ORDER — FAMOTIDINE 10 MG/ML
20 INJECTION, SOLUTION INTRAVENOUS ONCE
Status: COMPLETED | OUTPATIENT
Start: 2020-03-02 | End: 2020-03-02

## 2020-03-02 RX ORDER — ONDANSETRON 2 MG/ML
INJECTION INTRAMUSCULAR; INTRAVENOUS AS NEEDED
Status: DISCONTINUED | OUTPATIENT
Start: 2020-03-02 | End: 2020-03-02 | Stop reason: SURG

## 2020-03-02 RX ORDER — GLYCOPYRROLATE 0.2 MG/ML
INJECTION INTRAMUSCULAR; INTRAVENOUS AS NEEDED
Status: DISCONTINUED | OUTPATIENT
Start: 2020-03-02 | End: 2020-03-02 | Stop reason: SURG

## 2020-03-02 RX ORDER — SODIUM CHLORIDE 0.9 % (FLUSH) 0.9 %
3-10 SYRINGE (ML) INJECTION AS NEEDED
Status: DISCONTINUED | OUTPATIENT
Start: 2020-03-02 | End: 2020-03-02 | Stop reason: HOSPADM

## 2020-03-02 RX ORDER — MIDAZOLAM HYDROCHLORIDE 1 MG/ML
1 INJECTION INTRAMUSCULAR; INTRAVENOUS
Status: DISCONTINUED | OUTPATIENT
Start: 2020-03-02 | End: 2020-03-02 | Stop reason: HOSPADM

## 2020-03-02 RX ORDER — METOPROLOL SUCCINATE 25 MG/1
25 TABLET, EXTENDED RELEASE ORAL DAILY
Status: DISCONTINUED | OUTPATIENT
Start: 2020-03-02 | End: 2020-03-03 | Stop reason: HOSPADM

## 2020-03-02 RX ORDER — ACETAMINOPHEN 325 MG/1
325 TABLET ORAL EVERY 4 HOURS PRN
Status: DISCONTINUED | OUTPATIENT
Start: 2020-03-02 | End: 2020-03-03 | Stop reason: HOSPADM

## 2020-03-02 RX ORDER — ONDANSETRON 2 MG/ML
4 INJECTION INTRAMUSCULAR; INTRAVENOUS EVERY 6 HOURS PRN
Status: DISCONTINUED | OUTPATIENT
Start: 2020-03-02 | End: 2020-03-03 | Stop reason: HOSPADM

## 2020-03-02 RX ORDER — NALOXONE HCL 0.4 MG/ML
0.2 VIAL (ML) INJECTION AS NEEDED
Status: DISCONTINUED | OUTPATIENT
Start: 2020-03-02 | End: 2020-03-02 | Stop reason: HOSPADM

## 2020-03-02 RX ORDER — HYDROCODONE BITARTRATE AND ACETAMINOPHEN 7.5; 325 MG/1; MG/1
2 TABLET ORAL EVERY 4 HOURS PRN
Status: DISCONTINUED | OUTPATIENT
Start: 2020-03-02 | End: 2020-03-03 | Stop reason: HOSPADM

## 2020-03-02 RX ORDER — MECLIZINE HCL 12.5 MG/1
12.5 TABLET ORAL 3 TIMES DAILY PRN
COMMUNITY
End: 2020-04-20

## 2020-03-02 RX ORDER — LABETALOL HYDROCHLORIDE 5 MG/ML
5 INJECTION, SOLUTION INTRAVENOUS
Status: DISCONTINUED | OUTPATIENT
Start: 2020-03-02 | End: 2020-03-02 | Stop reason: HOSPADM

## 2020-03-02 RX ORDER — LIDOCAINE HYDROCHLORIDE 20 MG/ML
INJECTION, SOLUTION INFILTRATION; PERINEURAL AS NEEDED
Status: DISCONTINUED | OUTPATIENT
Start: 2020-03-02 | End: 2020-03-02 | Stop reason: SURG

## 2020-03-02 RX ORDER — VANCOMYCIN HYDROCHLORIDE 1 G/200ML
15 INJECTION, SOLUTION INTRAVENOUS ONCE
Status: COMPLETED | OUTPATIENT
Start: 2020-03-02 | End: 2020-03-02

## 2020-03-02 RX ORDER — SENNA AND DOCUSATE SODIUM 50; 8.6 MG/1; MG/1
2 TABLET, FILM COATED ORAL 2 TIMES DAILY
Status: DISCONTINUED | OUTPATIENT
Start: 2020-03-02 | End: 2020-03-03 | Stop reason: HOSPADM

## 2020-03-02 RX ORDER — MEPERIDINE HYDROCHLORIDE 25 MG/ML
12.5 INJECTION INTRAMUSCULAR; INTRAVENOUS; SUBCUTANEOUS
Status: DISCONTINUED | OUTPATIENT
Start: 2020-03-02 | End: 2020-03-02 | Stop reason: HOSPADM

## 2020-03-02 RX ORDER — SODIUM CHLORIDE, SODIUM LACTATE, POTASSIUM CHLORIDE, CALCIUM CHLORIDE 600; 310; 30; 20 MG/100ML; MG/100ML; MG/100ML; MG/100ML
9 INJECTION, SOLUTION INTRAVENOUS CONTINUOUS
Status: DISCONTINUED | OUTPATIENT
Start: 2020-03-02 | End: 2020-03-03 | Stop reason: HOSPADM

## 2020-03-02 RX ORDER — EPHEDRINE SULFATE 50 MG/ML
5 INJECTION, SOLUTION INTRAVENOUS ONCE AS NEEDED
Status: DISCONTINUED | OUTPATIENT
Start: 2020-03-02 | End: 2020-03-02 | Stop reason: HOSPADM

## 2020-03-02 RX ORDER — SODIUM CHLORIDE 0.9 % (FLUSH) 0.9 %
3 SYRINGE (ML) INJECTION EVERY 12 HOURS SCHEDULED
Status: DISCONTINUED | OUTPATIENT
Start: 2020-03-02 | End: 2020-03-02 | Stop reason: HOSPADM

## 2020-03-02 RX ORDER — MIDAZOLAM HYDROCHLORIDE 1 MG/ML
2 INJECTION INTRAMUSCULAR; INTRAVENOUS
Status: DISCONTINUED | OUTPATIENT
Start: 2020-03-02 | End: 2020-03-02 | Stop reason: HOSPADM

## 2020-03-02 RX ORDER — BISACODYL 10 MG
10 SUPPOSITORY, RECTAL RECTAL DAILY PRN
Status: DISCONTINUED | OUTPATIENT
Start: 2020-03-02 | End: 2020-03-03 | Stop reason: HOSPADM

## 2020-03-02 RX ADMIN — PROPOFOL 160 MG: 10 INJECTION, EMULSION INTRAVENOUS at 12:38

## 2020-03-02 RX ADMIN — GLYCOPYRROLATE 0.5 MG: 0.2 INJECTION INTRAMUSCULAR; INTRAVENOUS at 14:20

## 2020-03-02 RX ADMIN — HYDROMORPHONE HYDROCHLORIDE 0.5 MG: 1 INJECTION, SOLUTION INTRAMUSCULAR; INTRAVENOUS; SUBCUTANEOUS at 15:19

## 2020-03-02 RX ADMIN — PHENYLEPHRINE HYDROCHLORIDE 100 MCG: 10 INJECTION INTRAVENOUS at 13:55

## 2020-03-02 RX ADMIN — ROCURONIUM BROMIDE 40 MG: 10 INJECTION, SOLUTION INTRAVENOUS at 12:38

## 2020-03-02 RX ADMIN — SODIUM CHLORIDE, POTASSIUM CHLORIDE, SODIUM LACTATE AND CALCIUM CHLORIDE: 600; 310; 30; 20 INJECTION, SOLUTION INTRAVENOUS at 14:22

## 2020-03-02 RX ADMIN — VANCOMYCIN HYDROCHLORIDE 1000 MG: 1 INJECTION, SOLUTION INTRAVENOUS at 11:11

## 2020-03-02 RX ADMIN — CEFAZOLIN SODIUM 2 G: 2 INJECTION, SOLUTION INTRAVENOUS at 12:38

## 2020-03-02 RX ADMIN — MIDAZOLAM 1 MG: 1 INJECTION INTRAMUSCULAR; INTRAVENOUS at 11:20

## 2020-03-02 RX ADMIN — ROCURONIUM BROMIDE 10 MG: 10 INJECTION, SOLUTION INTRAVENOUS at 13:38

## 2020-03-02 RX ADMIN — ROCURONIUM BROMIDE 10 MG: 10 INJECTION, SOLUTION INTRAVENOUS at 13:08

## 2020-03-02 RX ADMIN — LIDOCAINE HYDROCHLORIDE 80 MG: 20 INJECTION, SOLUTION INFILTRATION; PERINEURAL at 12:38

## 2020-03-02 RX ADMIN — PROMETHAZINE HYDROCHLORIDE 12.5 MG: 25 INJECTION INTRAMUSCULAR; INTRAVENOUS at 16:23

## 2020-03-02 RX ADMIN — FENTANYL CITRATE 50 MCG: 50 INJECTION INTRAMUSCULAR; INTRAVENOUS at 13:24

## 2020-03-02 RX ADMIN — POLYETHYLENE GLYCOL 3350 17 G: 17 POWDER, FOR SOLUTION ORAL at 18:42

## 2020-03-02 RX ADMIN — MUPIROCIN 1 APPLICATION: 20 OINTMENT TOPICAL at 20:19

## 2020-03-02 RX ADMIN — FENTANYL CITRATE 50 MCG: 50 INJECTION INTRAMUSCULAR; INTRAVENOUS at 12:38

## 2020-03-02 RX ADMIN — FAMOTIDINE 20 MG: 10 INJECTION INTRAVENOUS at 11:19

## 2020-03-02 RX ADMIN — ONDANSETRON 4 MG: 2 INJECTION INTRAMUSCULAR; INTRAVENOUS at 16:13

## 2020-03-02 RX ADMIN — FENTANYL CITRATE 50 MCG: 50 INJECTION, SOLUTION INTRAMUSCULAR; INTRAVENOUS at 15:13

## 2020-03-02 RX ADMIN — ONDANSETRON HYDROCHLORIDE 4 MG: 2 SOLUTION INTRAMUSCULAR; INTRAVENOUS at 14:10

## 2020-03-02 RX ADMIN — APIXABAN 2.5 MG: 2.5 TABLET, FILM COATED ORAL at 22:42

## 2020-03-02 RX ADMIN — SODIUM CHLORIDE, POTASSIUM CHLORIDE, SODIUM LACTATE AND CALCIUM CHLORIDE 9 ML/HR: 600; 310; 30; 20 INJECTION, SOLUTION INTRAVENOUS at 11:11

## 2020-03-02 RX ADMIN — FENTANYL CITRATE 50 MCG: 50 INJECTION INTRAMUSCULAR; INTRAVENOUS at 14:20

## 2020-03-02 RX ADMIN — NEOSTIGMINE METHYLSULFATE 3 MG: 1 INJECTION INTRAMUSCULAR; INTRAVENOUS; SUBCUTANEOUS at 14:20

## 2020-03-02 RX ADMIN — PHENYLEPHRINE HYDROCHLORIDE 100 MCG: 10 INJECTION INTRAVENOUS at 13:14

## 2020-03-02 RX ADMIN — ROCURONIUM BROMIDE 10 MG: 10 INJECTION, SOLUTION INTRAVENOUS at 13:24

## 2020-03-02 RX ADMIN — SENNOSIDES AND DOCUSATE SODIUM 2 TABLET: 8.6; 5 TABLET ORAL at 20:19

## 2020-03-02 RX ADMIN — PROPOFOL 40 MG: 10 INJECTION, EMULSION INTRAVENOUS at 13:08

## 2020-03-02 RX ADMIN — VANCOMYCIN HYDROCHLORIDE 1000 MG: 1 INJECTION, SOLUTION INTRAVENOUS at 22:42

## 2020-03-02 RX ADMIN — FENTANYL CITRATE 50 MCG: 50 INJECTION INTRAMUSCULAR; INTRAVENOUS at 14:15

## 2020-03-02 RX ADMIN — PHENYLEPHRINE HYDROCHLORIDE 100 MCG: 10 INJECTION INTRAVENOUS at 14:10

## 2020-03-02 RX ADMIN — SODIUM CHLORIDE, POTASSIUM CHLORIDE, SODIUM LACTATE AND CALCIUM CHLORIDE: 600; 310; 30; 20 INJECTION, SOLUTION INTRAVENOUS at 14:32

## 2020-03-02 RX ADMIN — MEPERIDINE HYDROCHLORIDE 12.5 MG: 25 INJECTION INTRAMUSCULAR; INTRAVENOUS; SUBCUTANEOUS at 15:10

## 2020-03-02 NOTE — ANESTHESIA PREPROCEDURE EVALUATION
Anesthesia Evaluation     Patient summary reviewed and Nursing notes reviewed   NPO Solid Status: > 8 hours  NPO Liquid Status: > 2 hours           Airway   Mallampati: II  TM distance: >3 FB  Neck ROM: full  Dental - normal exam     Pulmonary - negative pulmonary ROS and normal exam   Cardiovascular - normal exam    ECG reviewed    (+) hypertension,       Neuro/Psych  (+) CVA, dizziness/light headedness, syncope,     GI/Hepatic/Renal/Endo - negative ROS     Musculoskeletal     Abdominal    Substance History - negative use     OB/GYN negative ob/gyn ROS         Other   arthritis,    history of cancer                    Anesthesia Plan    ASA 3     general       Anesthetic plan, all risks, benefits, and alternatives have been provided, discussed and informed consent has been obtained with: patient.

## 2020-03-02 NOTE — OP NOTE
Operative Note  Name: Marylu Georges  YOB: 1960  MRN: [id number]  BMI: Body mass index is 26.19 kg/m².    DATE OF SURGERY: 3/2/2020    PREOPERATIVE DIAGNOSIS:  right hip end-stage osteoarthritis    POSTOPERATIVE DIAGNOSIS: Same.    PROCEDURE PERFORMED:  right Total hip replacement with [place] navigation    SURGEON:  Luis M Leonard M.D.    ASSISTANT:  dale assistant new: ISAIAS Vuong    IMPLANTS:   Implant Name Type Inv. Item Serial No.  Lot No. LRB No. Used   INSRT FEM PINN ALTRX 10D 32X50 PLS4 - [id number] Implant INSRT FEM PINN ALTRX 10D 32X50 PLS4  DEPUY [id number] Right 1   CUP ACET PINN SECTOR W GRIPTN 50MM - [id number] Implant CUP ACET PINN SECTOR W GRIPTN 50MM  DEPUY [id number] Right 1   TRILOCK FEMORAL STEM 12/14 TAPER SIZE 7  MM    DEPUY [id number] Right 1   HD FEM BIOLOXDELTA/ART CERAM 32MM PLS1 - [id number] Implant HD FEM BIOLOXDELTA/ART CERAM 32MM PLS1  [id number] [id number] Right 1        Anesthesia: Gen. and local  IV fluid: 1500 crystalloid  Blood loss: 400 cc  Specimen: None  Drain: None  Complications: None   22 Modifier:  none    Description:    Marylu Georges is a 60 y.o.-year-old female with end-stage osteoarthritis of the hip, who presents today for surgery having failed nonoperative therapy.  This patient was brought into the operating room and placed in the supine position on the operative table. Surgical time out was performed.  Gen. anesthetic was induced, the patient was given IV antibiotics, and was placed in a lateral decubitus position with padding and an axillary roll. The operative lower extremity was prepped with Betadine scrub and paint, and draped with sterile drapes including an Ioban. 2 stab wounds were made above the iliac crest anterior superior iliac spine. Two 5 mm half pins were placed in the iliac crest and navigation protocol was undertaken.    A 3-4 inch incision was based in the skin posteriorly. Dissection was carried down to the fascia which  was opened. Retractors were placed. The sciatic nerve was identified and avoided. The short external rotators were identified and the capsulotomy was performed. The hip was dislocated and femoral neck cut was made. It was made in line with the template of just less than a fingerbreadth. Acetabular soft tissues were removed and retractors were placed. Acetabular navigation was achieved. Was then reamed sequentially up to fitted size. A trial was placed. It was reamed in approximately 40° of abduction and 20° of anteversion. Bone grafts were applied and appropriate size cup was selected and impacted to affix the cup. The final position was 41° of abduction and 22° of anteversion.    Femoral canal was opened with a . The femur was sequentially broached up to appropriate size using the Tri-Lock system and good fit and fill.  Offset was selected using the preoperative template for starting point and tried to correct fit. Leg length was  slightly lengthened (7mm) according the navigation which was in line with the preoperative template (6.4 mm short). Trial showed equivalent leg lengths, no undue tightness, no undue shock, and good approximation of soft tissues. The hip was stable anteriorly and 90° of flexion you could internally rotated to 80-85° before it started to come out.    Bony surfaces were irrigated and the Tri-Lock was impacted with gentle blows of the mallet. Once again trialed and found to do best with the chosen liner. It reproduced stability and leg length and offset of the trials. The ceramic femoral head was then applied and taken through final range of motion checked all of which were excellent. The wound was dry at the time of surgical conclusion. Tranexamic acid was given and orthopedic mix were infiltrated. The final irrigation was achieved with all 3 L of irrigation fluid. The short external rotators were repaired in an interrupted fashion with a good closure of the capsule and short  external rotators. The fascia was closed in an interrupted fashion in a watertight manner. Multilayered closure was placed in the subcutaneous fat which measured 3-4 inches. This required additional time and skill the skin was closed with a v lock suture. Surgical glue was applied and sterile dressings were applied as well. Findings needle and instrument counts reported to be correct. No complications noted but x-rays ordered for the recovery area.     Surgical assistant performed retraction, suction, hemostasis, and specific limb positioning and manipulation required for accuracy of joint placement, and assistance in wound closure and dressing application.     Dr. Luis M Leonard dictating an operative note.    3/2/2020

## 2020-03-02 NOTE — ANESTHESIA PROCEDURE NOTES
Airway  Urgency: elective    Date/Time: 3/2/2020 12:43 PM  Airway not difficult    General Information and Staff    Patient location during procedure: OR  Anesthesiologist: Orlando Gaytan MD  CRNA: Soni Erickson CRNA    Indications and Patient Condition  Indications for airway management: airway protection    Preoxygenated: yes  MILS maintained throughout  Mask difficulty assessment: 2 - vent by mask + OA or adjuvant +/- NMBA    Final Airway Details  Final airway type: endotracheal airway      Successful airway: ETT  Cuffed: yes   Successful intubation technique: direct laryngoscopy  Facilitating devices/methods: intubating stylet  Endotracheal tube insertion site: oral  Blade: Jonathon  Blade size: 3  ETT size (mm): 7.0  Cormack-Lehane Classification: grade I - full view of glottis  Placement verified by: chest auscultation and capnometry   Cuff volume (mL): 7  Measured from: lips  ETT/EBT  to lips (cm): 21  Number of attempts at approach: 1  Assessment: lips, teeth, and gum same as pre-op and atraumatic intubation    Additional Comments  Patient in OR. Monitors on. BLVS. Pre 02 100%. SIVI. DL x1. DVVC. Atraumatic placement of ETT. Placement verified with ETC02 and BBS. ETT secured. Teeth/lips in pre-op condition.

## 2020-03-02 NOTE — ANESTHESIA POSTPROCEDURE EVALUATION
Patient: Marylu Georges    Procedure Summary     Date:  03/02/20 Room / Location:  [facility] OR [address] [place] [address] MAIN OR    Anesthesia Start:  1228 Anesthesia Stop:  1448    Procedure:  RIGHT TOTAL HIP ARTHROPLASTY NATALY NAVIGATION (Right Hip) Diagnosis:       Arthritis of right hip      (Arthritis of right hip [M16.11])    Surgeon:  Luis M Leonard MD Provider:  Orlando Gaytan MD    Anesthesia Type:  general ASA Status:  3          Anesthesia Type: general    Vitals  Vitals Value Taken Time   BP 95/73 3/2/2020  4:15 PM   Temp 36.4 °C (97.5 °F) 3/2/2020  2:44 PM   Pulse 58 3/2/2020  4:15 PM   Resp 12 3/2/2020  4:15 PM   SpO2 97 % 3/2/2020  4:26 PM   Vitals shown include unvalidated device data.        Post Anesthesia Care and Evaluation    Patient location during evaluation: PACU  Patient participation: complete - patient participated  Level of consciousness: awake and alert  Pain score: 2  Pain management: adequate  Airway patency: patent  Anesthetic complications: No anesthetic complications  PONV Status: controlled  Cardiovascular status: acceptable  Respiratory status: acceptable  Hydration status: acceptable

## 2020-03-03 VITALS
HEART RATE: 83 BPM | HEIGHT: 65 IN | TEMPERATURE: 99.2 F | OXYGEN SATURATION: 94 % | RESPIRATION RATE: 16 BRPM | DIASTOLIC BLOOD PRESSURE: 56 MMHG | SYSTOLIC BLOOD PRESSURE: 98 MMHG | BODY MASS INDEX: 26.22 KG/M2 | WEIGHT: 157.38 LBS

## 2020-03-03 LAB
HCT VFR BLD AUTO: 27 % (ref 34–46.6)
HGB BLD-MCNC: 9.1 G/DL (ref 12–15.9)

## 2020-03-03 PROCEDURE — 85018 HEMOGLOBIN: CPT | Performed by: NURSE PRACTITIONER

## 2020-03-03 PROCEDURE — 25010000002 ONDANSETRON PER 1 MG: Performed by: NURSE PRACTITIONER

## 2020-03-03 PROCEDURE — 97110 THERAPEUTIC EXERCISES: CPT

## 2020-03-03 PROCEDURE — G0378 HOSPITAL OBSERVATION PER HR: HCPCS

## 2020-03-03 PROCEDURE — 97535 SELF CARE MNGMENT TRAINING: CPT

## 2020-03-03 PROCEDURE — 25010000002 KETOROLAC TROMETHAMINE PER 15 MG: Performed by: NURSE PRACTITIONER

## 2020-03-03 PROCEDURE — 99024 POSTOP FOLLOW-UP VISIT: CPT | Performed by: NURSE PRACTITIONER

## 2020-03-03 PROCEDURE — 97166 OT EVAL MOD COMPLEX 45 MIN: CPT

## 2020-03-03 PROCEDURE — 85014 HEMATOCRIT: CPT | Performed by: NURSE PRACTITIONER

## 2020-03-03 PROCEDURE — 97162 PT EVAL MOD COMPLEX 30 MIN: CPT

## 2020-03-03 RX ORDER — SENNA AND DOCUSATE SODIUM 50; 8.6 MG/1; MG/1
2 TABLET, FILM COATED ORAL 2 TIMES DAILY
Qty: 120 TABLET | Refills: 0 | Status: SHIPPED | OUTPATIENT
Start: 2020-03-03 | End: 2020-04-20

## 2020-03-03 RX ORDER — ONDANSETRON 4 MG/1
4 TABLET, ORALLY DISINTEGRATING ORAL EVERY 8 HOURS PRN
Qty: 20 TABLET | Refills: 3 | Status: SHIPPED | OUTPATIENT
Start: 2020-03-03 | End: 2020-04-14

## 2020-03-03 RX ORDER — KETOROLAC TROMETHAMINE 30 MG/ML
15 INJECTION, SOLUTION INTRAMUSCULAR; INTRAVENOUS ONCE
Status: COMPLETED | OUTPATIENT
Start: 2020-03-03 | End: 2020-03-03

## 2020-03-03 RX ORDER — BISACODYL 5 MG/1
5 TABLET, DELAYED RELEASE ORAL DAILY PRN
Qty: 30 TABLET | Refills: 2 | Status: SHIPPED | OUTPATIENT
Start: 2020-03-03 | End: 2020-04-14

## 2020-03-03 RX ORDER — HYDROCODONE BITARTRATE AND ACETAMINOPHEN 7.5; 325 MG/1; MG/1
TABLET ORAL
Qty: 84 TABLET | Refills: 0 | Status: SHIPPED | OUTPATIENT
Start: 2020-03-03 | End: 2020-04-14

## 2020-03-03 RX ADMIN — POLYETHYLENE GLYCOL 3350 17 G: 17 POWDER, FOR SOLUTION ORAL at 08:50

## 2020-03-03 RX ADMIN — MUPIROCIN 1 APPLICATION: 20 OINTMENT TOPICAL at 08:49

## 2020-03-03 RX ADMIN — HYDROCODONE BITARTRATE AND ACETAMINOPHEN 1 TABLET: 7.5; 325 TABLET ORAL at 08:50

## 2020-03-03 RX ADMIN — HYDROCODONE BITARTRATE AND ACETAMINOPHEN 1 TABLET: 7.5; 325 TABLET ORAL at 04:17

## 2020-03-03 RX ADMIN — Medication 2 DROP: at 00:35

## 2020-03-03 RX ADMIN — LETROZOLE 2.5 MG: 2.5 TABLET ORAL at 08:49

## 2020-03-03 RX ADMIN — ONDANSETRON 4 MG: 2 INJECTION INTRAMUSCULAR; INTRAVENOUS at 04:07

## 2020-03-03 RX ADMIN — KETOROLAC TROMETHAMINE 15 MG: 30 INJECTION, SOLUTION INTRAMUSCULAR at 08:49

## 2020-03-03 RX ADMIN — SENNOSIDES AND DOCUSATE SODIUM 2 TABLET: 8.6; 5 TABLET ORAL at 08:49

## 2020-03-03 RX ADMIN — APIXABAN 2.5 MG: 2.5 TABLET, FILM COATED ORAL at 08:49

## 2020-03-03 NOTE — PROGRESS NOTES
Case Management Discharge Note      Final Note: home with [facility]; [facility] notified of d/c    Provided Post Acute Provider List?: Yes  Post Acute Provider List: Home Health  Delivered To: Patient  Method of Delivery: In person    Destination      No service has been selected for the patient.      Durable Medical Equipment      No service has been selected for the patient.      Dialysis/Infusion      No service has been selected for the patient.      Home Medical Care - Selection Complete      Service Provider Request Status Selected Services Address Phone Number Fax Number    [facility] Selected Home Health Services [address] [address] [phone]      Therapy      No service has been selected for the patient.      Community Resources      No service has been selected for the patient.             Final Discharge Disposition Code: 06 - home with home health care

## 2020-03-03 NOTE — DISCHARGE SUMMARY
Orthopedic Discharge Summary      Patient: Marylu Georges  YOB: 1960  Medical Record Number: [id number]    Attending Physician: Luis M Leonard MD  Consulting Physician(s): none  Consults     No orders found for last 30 day(s).          Date of Admission: 3/2/2020 10:25 AM  Date of Discharge: 3/3/2020    Discharge Diagnosis: RIGHT TOTAL HIP ARTHROPLASTY NATALY NAVIGATION,   Acute Blood Loss Anemia, stable  Post-operative Pain  Limited mobility, requires use of walker and assistance when OOB.    Presenting Problem/History of Present Illness: Arthritis of right hip [M16.11]  Arthritis of right hip [M16.11]  Arthritis of right hip [M16.11]        Allergies:   Allergies   Allergen Reactions   • Benadryl [Diphenhydramine] Itching and Other (See Comments)     INCREASES BLOOD PRESSURE   • Erythromycin GI Intolerance   • Levaquin [Levofloxacin] GI Intolerance   • Penicillins GI Intolerance       Discharge Medications       Discharge Medications      New Medications      Instructions Start Date   apixaban 2.5 MG tablet tablet  Commonly known as:  ELIQUIS   2.5 mg, Oral, Every 12 Hours Scheduled      bisacodyl 5 MG EC tablet  Commonly known as:  DULCOLAX   5 mg, Oral, Daily PRN      HYDROcodone-acetaminophen 7.5-325 MG per tablet  Commonly known as:  NORCO   1 tabs po q 3-4 hr prn severe post op pain, wean as tolerated      ondansetron ODT 4 MG disintegrating tablet  Commonly known as:  ZOFRAN-ODT   4 mg, Oral, Every 8 Hours PRN      senna-docusate sodium 8.6-50 MG tablet  Commonly known as:  SENOKOT-S   2 tablets, Oral, 2 Times Daily, Until your first bowel movement and then daily as needed         Continue These Medications      Instructions Start Date   acetaminophen 500 MG tablet  Commonly known as:  TYLENOL   500 mg, Oral, Every 6 Hours PRN      letrozole 2.5 MG tablet  Commonly known as:  FEMARA   2.5 mg, Oral, Daily      meclizine 12.5 MG tablet  Commonly known as:  ANTIVERT   12.5 mg, Oral, 3 Times  Daily PRN      metoprolol succinate XL 25 MG 24 hr tablet  Commonly known as:  TOPROL XL   25 mg, Oral, Daily         Stop These Medications    aspirin 81 MG EC tablet     CHLORHEXIDINE GLUCONATE CLOTH EX     cholecalciferol 25 MCG (1000 UT) tablet  Commonly known as:  VITAMIN D3     diclofenac 75 MG EC tablet  Commonly known as:  VOLTAREN     IBUPROFEN PO     MULTI-VITAMIN DAILY PO     mupirocin 2 % nasal ointment  Commonly known as:  BACTROBAN     oxyCODONE-acetaminophen 5-325 MG per tablet  Commonly known as:  PERCOCET              Past Medical History:   Diagnosis Date   • Anemia in neoplastic disease    • Arthritis    • Breast cancer (CMS/HCC)     Left   • CVA (cerebral vascular accident) (CMS/HCC)    • Hip pain     RIGHT HIP... CYST   • History of fracture of leg 1987   • History of radiation therapy     LAST TREATMENT     • Hypertension    • Limited joint range of motion (ROM)     RIGHT HIP   • Skin sore     OPEN SORE LEFT BREAST   • Syncope    • Vertigo         Past Surgical History:   Procedure Laterality Date   • AXILLARY LYMPH NODE BIOPSY/EXCISION Right     LYMPH NODE UNDER RIGHT ARM-MALIGNANT (DOUBLE MASTECTOMY)   • BREAST BIOPSY Left     MALIGNANT   • FRACTURE SURGERY  1987    Leg   • HARDWARE REMOVAL     • MASTECTOMY W/ SENTINEL NODE BIOPSY Bilateral 9/16/2019    Procedure: BILATERLA MODIFIED RADICAL MASTECTOMY WITH BILATERAL SENTINEL LYMPH NODE BIOPSY;  Surgeon: Joby Barron Jr., MD;  Location: [facility];  Service: General   • VENOUS ACCESS DEVICE (PORT) INSERTION Right 2/1/2019    Procedure: INSERTION VENOUS ACCESS DEVICE;  Surgeon: Joby Barron Jr., MD;  Location: [facility];  Service: General   • VENOUS ACCESS DEVICE (PORT) REMOVAL N/A 10/30/2019    Procedure: Mediport Removal;  Surgeon: Joby Barron Jr., MD;  Location: [facility] OR;  Service: General        Social History     Occupational History   • Occupation:      Employer: SELF-EMPLOYED   Tobacco Use   •  Smoking status: Never Smoker   • Smokeless tobacco: Never Used   Substance and Sexual Activity   • Alcohol use: Not Currently     Alcohol/week: 7.0 standard drinks     Types: 4 Glasses of wine, 3 Cans of beer per week     Frequency: 2-3 times a week     Drinks per session: 1 or 2     Binge frequency: Never   • Drug use: No   • Sexual activity: Not Currently     Partners: Male     Birth control/protection: Post-menopausal      Social History     Social History Narrative   • Not on file        Family History   Problem Relation Age of Onset   • Lung cancer Father    • Cancer Mother    • Breast cancer Mother    • Liver disease Mother    • Breast cancer Sister 48   • Breast cancer Maternal Grandmother    • Breast cancer Paternal Grandmother    • Breast cancer Maternal Uncle    • Malig Hyperthermia Neg Hx          Physical Exam: 60 y.o. female   Body mass index is 26.19 kg/m².  Facility age limit for growth percentiles is 20 years.  Vitals:    03/03/20 0641   BP: 98/56   Pulse: 83   Resp: 16   Temp: 99.2 °F (37.3 °C)   SpO2: 94%         General Appearance:    Alert, cooperative, in no acute distress                      Vitals:    03/02/20 2313 03/02/20 2315 03/03/20 0327 03/03/20 0641   BP: 92/59  91/59 98/56   BP Location: Right arm  Right arm Right arm   Patient Position: Lying  Lying Lying   Pulse: 76  87 83   Resp: 16  16 16   Temp: (!) 100.7 °F (38.2 °C)  Comment: rn called 99.3 °F (37.4 °C)  Comment: after patient used IS 99.5 °F (37.5 °C) 99.2 °F (37.3 °C)   TempSrc: Oral Oral Oral Oral   SpO2: 97%  94% 94%   Weight:       Height:            DIAGNOSTIC TESTS:   Admission on 03/02/2020   Component Date Value Ref Range Status   • Hemoglobin 03/03/2020 9.1* 12.0 - 15.9 g/dL Final   • Hematocrit 03/03/2020 27.0* 34.0 - 46.6 % Final       Imaging Results (Last 72 Hours)     Procedure Component Value Units Date/Time    XR Hip 1 View Without Pelvis Right (Surgery Only) [[id number]] Collected:  03/02/20 1521     Updated:   03/02/20 1531    Narrative:       XR HIP 1 VIEW WO PELVIS RIGHT-  03/02/2020     HISTORY: Status post right hip arthroplasty.     The acetabular and proximal femoral components of the right hip  prosthesis are well-seated with no abnormal surrounding bony lucencies.  Soft tissue air is seen. No fractures or other unexpected findings are  noted.       Impression:       Satisfactory postoperative appearance of the right hip.     This report was finalized on 3/2/2020 3:28 PM by Dr. Anatoliy Hughes M.D.             Hospital Course:  60 y.o. female admitted to [facility] to services of Luis M Leonard MD with Arthritis of right hip [M16.11]  Arthritis of right hip [M16.11]  Arthritis of right hip [M16.11] on 3/2/2020 and underwent RIGHT TOTAL HIP ARTHROPLASTY NATALY NAVIGATION  Per Luis M Leonard MD. Antibiotic and VTE prophylaxis were per SCIP protocols. Post-operatively the patient transferred to the post-operative floor where the patient underwent mobilization therapy that included active as well as passive ROM exercises. Opioids were titrated to achieve appropriate pain management to allow for participation in mobilization exercises. Vital signs are now stable. On the day of discharge the wound was clean, dry and intact and calf was soft and non tender and Homans sign was negative. Operative extremity neurovascular status remains intact.   Appropriate education re: incision care, activity levels, medications, and follow up visits was completed and all questions were answered. The patient is now deemed stable for discharge.    Condition on Discharge:  Stable    Total Joint Replacement Discharge Instructions: Patient is to continue with physical therapy exercises twice daily and continue working with the physical therapist as ordered  and should be up walking every 2 hours during the day in addition to the physical therapy exercises. Patient may weight bear as tolerated unless otherwise specified. Continue  to ice regularly. Do frequent ankle pumping exercises while you are sitting with legs elevated.  Patient also instructed on deep breathing, coughing, and using  incentive spirometer during hospitalization and encouraged to continue to use at home regularly.      I. INCISION CARE:    For Total Hip Replacement: Do not change dressing unless saturated until 10-14 days post op.  On POD#5 May shower but keep dressing in place and pat dry.  May remove dressing on post op day #10-14 and shower:  the wound should be gently cleaned with antibacterial soap then allowed to dry. Do NOT scrub the glue at the incision site, pat dry after shower.  Call if any unusual drainage past POD#5, pus, redness, or coming apart at the edge.      General Care TIPS:  ? Wash your hands prior to dressing changes  ? Change the dressing as needed to keep incision clean and dry. Utilize dry gauze and paper tape. Avoid touching the side of the gauze that goes against the incision with your hands.  ? No creams or ointments to the incision, lotion may be applied to the other parts of the leg to keep skin moist and intact.  ? May remove dressing once the incision is free of drainage  ? Do not touch or pick at the incision  ? Check incision and notify surgeon immediately if any of the following signs or symptoms are noted:  o Increase in redness  o Increase in swelling around the incision and of the entire extremity  o Increase in pain  o Drainage oozing from the incision  o Pulling apart of the edges of the incision  o Increase in overall body temperature (greater than 100.5 degrees) Make sure you are doing your incentive spirometer and deep breathing exercises every day while awake as this is the most frequent cause of low grade fever post operatively.  ? Your suture or staple removal  will be done in the office at your follow-up visit.        II. ACTIVITIES:  1. Exercises:  ? Complete exercise program as taught by the hospital physical therapist 2  times per day  ? Exercise program will be advanced by the physical therapist  ? During the day be up ambulating every 2 hours (while awake) for short distances  ? Complete the ankle pump exercises at least 10 times per hour (while awake)  ? Elevate legs most of the day the first week post operatively and thereafter elevate legs when in bed and for at least 30 minutes during the day. Caution must be taken to avoid pillow placement under the bend of the knee as this can led to flexion contractures of the knee.  ? Use cold packs 20-30 minutes approximately 5 times per day. This should be done before and after completing your exercises and at any time you are experiencing pain/ stiffness in your operative extremity.      2. Activities of Daily Living:  ? No tub baths, hot tubs, or swimming pools for 6 weeks.  ? May shower and let water run over the incision on post-operative day #5 if no drainage. Do not scrub or rub the incision. Simply let the water run over the incision and pat dry.    III. Restrictions  ? Do not cross legs or kneel  ? Your surgeon will discuss with you when you will be able to drive again.  ? Weight bearing is as tolerated  ? First week stay inside on even terrain. May go up and down stairs one stair at a time utilizing the hand rail  ? After one week, you may venture outside.    IV. Precautions:  ? Everyone that comes near you should wash their hands  ? No elective dental, genital-urinary, or colon procedures or surgical procedures for 12 weeks after surgery unless absolutely necessary.  ?  If dental work or surgical procedure is deemed absolutely necessary, you will need to request antibiotics from your dentist as you will need to take antibiotics 1 hour prior to any dental work (including teeth cleanings).  ? Please discuss with your surgeon prophylactic antibiotics as the length of time this intervention will be necessary for you varies with each patient’s health history and situation, but the  minimum is 2 years following a total joint replacement.  ? Avoid sick people. If you must be around someone who is ill, they should wear a mask.  ? Avoid visits to the Emergency Room or Urgent Care unless you are having a life threatening event.       V. Medications:   1. Anticoagulants: You will be discharged on an anticoagulant. This is a prophylactic medication that helps prevent blood clots during your post-operative period. The type and length of dosage varies based on your individual needs, procedure performed, and surgeon’s preference.  ? While taking the anticoagulant, you should avoid taking any additional aspirin, ibuprofen (Advil or Motrin), Aleve (Naprosyn) or other non-steroidal anti-inflammatory medications.   ? Notify surgeon immediately if any karis bleeding is noted in the urine, stool, emesis, or from the nose or the incision. Blood in the stool will often appear as black rather than red. Blood in urine may appear as pink. Blood in emesis may appear as brown/black like coffee grounds.  ? You will need to apply pressure for longer periods of time to any cuts or abrasions to stop bleeding  ? Avoid alcohol while taking anticoagulants    2. Stool Softeners: You will be at greater risk of constipation after surgery due to being less mobile and the pain medications.   ? Take stool softeners as instructed by your surgeon while on pain medications. Over the counter Miralax 1-2 times daily, or Colace 100 mg 1-2 capsules twice daily.   ? If stools become too loose or too frequent, please decreases the dosage or stop the stool softener.  ? If constipation occurs despite use of stool softeners, you are to continue the stool softeners and add a laxative (Milk of Magnesia 1 ounce daily as needed)  ? Drink plenty of fluids, and eat fruits and vegetables during your recovery time    3. Pain Medications utilized after surgery are narcotics and the law requires that the following information be given to all patients  that are prescribed narcotics:  ? CLASSIFICATION: Pain medications are called Opioids and are narcotics  ? LEGALITIES: It is illegal to share narcotics with others and to drive within 24 hours of taking narcotics  ? POTENTIAL SIDE EFFECTS: Potential side effects of opioids include: nausea, vomiting, itching, dizziness, drowsiness, dry mouth, constipation, and difficulty urinating.  ? POTENTIAL ADVERSE EFFECTS:   o Opioid tolerance can develop with use of pain medications and this simply means that it requires more and more of the medication to control pain; however, this is seen more in patients that use opioids for longer periods of time.  o Opioid dependence can develop with use of Opioids and this simply means that to stop the medication can cause withdrawal symptoms; however, this is seen with patients that use Opioids for longer periods of time.  o Opioid addiction can develop with use of Opioids and the incidence of this is very unlikely in patients who take the medications as ordered and stop the medications as instructed.  o Opioid overdose can be dangerous, but is unlikely when the medication is taken as ordered and stopped when ordered. It is important not to mix opioids with alcohol or with and type of sedative such as Benadryl as this can lead to over sedation and respiratory difficulty.  ? DOSAGE:   o Pain medications will need to be taken consistently for the first week to decrease pain and promote adequate pain relief and participation in physical therapy.  o After the initial surgical pain begins to resolve, you may begin to decrease the pain medication. By the end of 6-8 weeks, you should be off of pain medications.  o Refills will not be given by the office during evening hours, on weekends, or after 8 weeks post-op.  o To seek refills on pain medications during the initial 6 week post-operative period, you must call the office 48 hours in advance to request the refill. The office will then notify  you when to  the prescription. DO NOT wait until you are out of the medication to request a refill.    VI. FOLLOW-UP VISITS:  ? Follow up in the office with ISAIAS Vuong in 2 weeks - If already scheduled (see Future Appointments) for date and time, if not yet scheduled, patient to call the office at [phone] to schedule. Prescriptions were given for pain medication, nausea, constipation, and blood thinner therapy.    ? If you have any concerns or suspected complications prior to your follow up visit, please call your surgeon's office. Do not wait until your appointment time if you suspect complications. These will need to be addressed in the office promptly.      Future Appointments   Date Time Provider Department Center   3/16/2020 10:40 AM Rosaura Zamorano APRN [facility] [address] None   4/14/2020 10:30 AM LAB CHAIR [address] [facility]   4/14/2020 11:00 AM Elie Dixon MD [facility]     Additional Instructions for the Follow-ups that You Need to Schedule     Discharge Follow-up with Specialty: Dr. Luis M Kyle or ISAIAS Vuong at [facility] [phone]; 2 Weeks   As directed      Specialty:  Dr. Luis M Kyle or ISAIAS Vuong at [facility] [phone]    Follow Up:  2 Weeks         Referral to Home Health   As directed      Face to Face Visit Date:  3/2/2020    Follow-up provider for Plan of Care?:  I will be treating the patient on an ongoing basis.  Please send me the Plan of Care for signature.    Follow-up provider:  LUIS M KYLE [[id number]]    Reason/Clinical Findings:  Post operative pain with ambulation, requires use of walker for ambulation    Describe mobility limitations that make leaving home difficult:  Requires assist of another to leave home    Nursing/Therapeutic Services Requested:  Physical Therapy    PT orders:  Total joint pathway    Frequency:  1 Week 1               Discharge Disposition Plan:today to  home, home health and when cleared by physical therapy as safe for discharge    Date: 3/3/2020    ISAIAS Burdick    Seen today by Luis M Leonard M.D. And ISAIAS Vuong.

## 2020-03-03 NOTE — PROGRESS NOTES
Discharge Planning Assessment  [facility]     Patient Name: Marylu Georges  MRN: [id number]  Today's Date: 3/3/2020    Admit Date: 3/2/2020    Discharge Needs Assessment     Row Name 03/03/20 0958       Living Environment    Lives With  spouse    Current Living Arrangements  home/apartment/condo    Primary Care Provided by  self    Provides Primary Care For  no one    Family Caregiver if Needed  spouse;sibling(s)    Quality of Family Relationships  involved;helpful    Able to Return to Prior Arrangements  yes       Resource/Environmental Concerns    Resource/Environmental Concerns  home accessibility    Home Accessibility Concerns  stairs to enter home       Transition Planning    Patient/Family Anticipates Transition to  home with family    Patient/Family Anticipated Services at Transition  none    Transportation Anticipated  family or friend will provide       Discharge Needs Assessment    Readmission Within the Last 30 Days  no previous admission in last 30 days    Concerns to be Addressed  no discharge needs identified    Equipment Currently Used at Home  none    Provided Post Acute Provider List?  Yes    Post Acute Provider List  Home Health    Delivered To  Patient    Method of Delivery  In person    Patient's Choice of Community Agency(s)  [facility] referral to [facility]        Discharge Plan     Row Name 03/03/20 1000       Plan    Plan  home with [facility]    Patient/Family in Agreement with Plan  yes    Plan Comments   Met with pt bedside. Confirmed face sheet correct. Explained role of CCP. Pt repots she lives with spouse in a first floor apartment. Pt is IADLs. Pt reports no PCP and doesn’t want one as she still sees oncologist and he manages everything. Pt reports she has never used HH or SNF. Pt reports she plans home and wants to use [facility], referral to [facility]. Per RN pt will only be on Eliquis for 30 days so CCP asked retail pharmacy to use 30 free trial coupon. CCP to follow…CHRIS Tamez      Row Name 03/03/20  0834       Plan    Plan Comments  Call from retail pharmacy pt's Eliquis is $469.83. Asked RN Darlene to call MD to see if he can adjust medications due to cost for pt. CHRIS Tamez        Destination      Coordination has not been started for this encounter.      Durable Medical Equipment      Coordination has not been started for this encounter.      Dialysis/Infusion      Coordination has not been started for this encounter.      Home Medical Care - Selection Complete      Service Provider Request Status Selected Services Address Phone Number Fax Number    [facility] [address] [address] [phone]      Therapy      Coordination has not been started for this encounter.      Community Resources      Coordination has not been started for this encounter.        Expected Discharge Date and Time     Expected Discharge Date Expected Discharge Time    Mar 3, 2020         Demographic Summary     Row Name 03/03/20 0958       General Information    Admission Type  observation    Arrived From  home    Reason for Consult  discharge planning    Preferred Language  English     Used During This Interaction  no       Contact Information    Permission Granted to Share Info With  facility ;family/designee        Functional Status    No documentation.       Psychosocial    No documentation.       Abuse/Neglect    No documentation.       Legal    No documentation.       Substance Abuse    No documentation.       Patient Forms    No documentation.           FLACO Dunaway

## 2020-03-03 NOTE — PLAN OF CARE
Problem: Patient Care Overview  Goal: Plan of Care Review  Outcome: Ongoing (interventions implemented as appropriate)  Flowsheets  Taken 3/3/2020 2942  Progress: improving  Outcome Summary: POD#1 of right SONA. Dressing is C/D/I. VSS. Pain has been minimal this shift and has been managed with PO meds. Ambulating with assist x1 and walker. Voiding function is intact. Plan is to d/c home when stable.  Taken 3/2/2020 2022  Plan of Care Reviewed With: patient

## 2020-03-03 NOTE — THERAPY DISCHARGE NOTE
Patient Name: Marylu Georges  : 1960    MRN: [id number]                              Today's Date: 3/3/2020       Admit Date: 3/2/2020    Visit Dx:     ICD-10-CM ICD-9-CM   1. Arthritis of right hip M16.11 716.95     Patient Active Problem List   Diagnosis   • Malignant neoplasm of overlapping sites of left breast in female, estrogen receptor positive (CMS/HCC)   • Anemia in neoplastic disease   • Axillary mass, right   • Family history of breast cancer in female   • Syncope   • Malignant neoplasm of overlapping sites of both breasts in female, estrogen receptor positive (CMS/HCC)   • Essential hypertension   • Acute radiation dermatitis   • Arthritis of right hip     Past Medical History:   Diagnosis Date   • Anemia in neoplastic disease    • Arthritis    • Breast cancer (CMS/HCC)     Left   • CVA (cerebral vascular accident) (CMS/HCC)    • Hip pain     RIGHT HIP... CYST   • History of fracture of leg    • History of radiation therapy     LAST TREATMENT     • Hypertension    • Limited joint range of motion (ROM)     RIGHT HIP   • Skin sore     OPEN SORE LEFT BREAST   • Syncope    • Vertigo      Past Surgical History:   Procedure Laterality Date   • AXILLARY LYMPH NODE BIOPSY/EXCISION Right     LYMPH NODE UNDER RIGHT ARM-MALIGNANT (DOUBLE MASTECTOMY)   • BREAST BIOPSY Left     MALIGNANT   • FRACTURE SURGERY      Leg   • HARDWARE REMOVAL     • MASTECTOMY W/ SENTINEL NODE BIOPSY Bilateral 2019    Procedure: BILATERLA MODIFIED RADICAL MASTECTOMY WITH BILATERAL SENTINEL LYMPH NODE BIOPSY;  Surgeon: Joby Barron Jr., MD;  Location: [facility];  Service: General   • TOTAL HIP ARTHROPLASTY Right 3/2/2020    Procedure: RIGHT TOTAL HIP ARTHROPLASTY NATALY NAVIGATION;  Surgeon: Luis M Leonard MD;  Location: [facility];  Service: Orthopedics;  Laterality: Right;   • VENOUS ACCESS DEVICE (PORT) INSERTION Right 2019    Procedure: INSERTION VENOUS ACCESS DEVICE;  Surgeon: Anderson  Joby Brito MD;  Location:  [facility];  Service: General   • VENOUS ACCESS DEVICE (PORT) REMOVAL N/A 10/30/2019    Procedure: Mediport Removal;  Surgeon: Joby Barron Jr., MD;  Location: [facility] OR;  Service: General     General Information     Row Name 03/03/20 1008          PT Evaluation Time/Intention    Document Type  evaluation;discharge evaluation/summary  -EJ     Mode of Treatment  physical therapy  -EJ     Row Name 03/03/20 1008          General Information    Patient Profile Reviewed?  yes  -EJ     Prior Level of Function  independent:;ADL's;community mobility;all household mobility  -EJ     Existing Precautions/Restrictions  hip, posterior;right  -EJ     Barriers to Rehab  none identified  -EJ     Row Name 03/03/20 1008          Relationship/Environment    Lives With  spouse  -EJ     Row Name 03/03/20 1008          Resource/Environmental Concerns    Current Living Arrangements  home/apartment/condo  -EJ     Row Name 03/03/20 1008          Home Main Entrance    Number of Stairs, Main Entrance  two  -EJ     Row Name 03/03/20 1008          Stairs Within Home, Primary    Number of Stairs, Within Home, Primary  none  -EJ     Row Name 03/03/20 1008          Cognitive Assessment/Intervention- PT/OT    Orientation Status (Cognition)  oriented x 3  -EJ       User Key  (r) = Recorded By, (t) = Taken By, (c) = Cosigned By    Initials Name Provider Type    EJ Juana Hernández, PT Physical Therapist        Mobility     Row Name 03/03/20 1009          Bed Mobility Assessment/Treatment    Bed Mobility Assessment/Treatment  supine-sit  -EJ     Supine-Sit Reynolds (Bed Mobility)  minimum assist (75% patient effort)  -EJ     Assistive Device (Bed Mobility)  head of bed elevated  -EJ     Row Name 03/03/20 1009          Sit-Stand Transfer    Sit-Stand Reynolds (Transfers)  verbal cues;stand by assist  -EJ     Assistive Device (Sit-Stand Transfers)  walker, front-wheeled  -EJ     Row Name 03/03/20 1009           Gait/Stairs Assessment/Training    Gait/Stairs Assessment/Training  gait/ambulation independence  -EJ     Glidden Level (Gait)  verbal cues;stand by assist;contact guard  -EJ     Assistive Device (Gait)  walker, front-wheeled  -EJ     Distance in Feet (Gait)  200  -EJ     Deviations/Abnormal Patterns (Gait)  antalgic;jonnathan decreased;stride length decreased  -EJ     Bilateral Gait Deviations  heel strike decreased  -EJ     Glidden Level (Stairs)  verbal cues;contact guard  -EJ     Handrail Location (Stairs)  both sides  -EJ     Number of Steps (Stairs)  4  -EJ     Ascending Technique (Stairs)  step-to-step  -EJ     Descending Technique (Stairs)  step-to-step  -EJ     Row Name 03/03/20 1009          Mobility Assessment/Intervention    Extremity Weight-bearing Status  right lower extremity  -EJ     Right Lower Extremity (Weight-bearing Status)  weight-bearing as tolerated (WBAT)  -EJ       User Key  (r) = Recorded By, (t) = Taken By, (c) = Cosigned By    Initials Name Provider Type    Juana Landry, PT Physical Therapist        Obj/Interventions     Row Name 03/03/20 1010          General ROM    GENERAL ROM COMMENTS  WFL, x R hip  -EJ     Row Name 03/03/20 1010          MMT (Manual Muscle Testing)    General MMT Comments  post op weakness  -EJ     Row Name 03/03/20 1010          Therapeutic Exercise    Comment (Therapeutic Exercise)  R THR protocol x 10 reps  -EJ       User Key  (r) = Recorded By, (t) = Taken By, (c) = Cosigned By    Initials Name Provider Type    Juana Landry, PT Physical Therapist        Goals/Plan    No documentation.       Clinical Impression     Row Name 03/03/20 1010          Pain Assessment    Additional Documentation  Pain Scale: Numbers Pre/Post-Treatment (Group)  -EJ     Row Name 03/03/20 1010          Pain Scale: Numbers Pre/Post-Treatment    Pain Scale: Numbers, Pretreatment  5/10  -EJ     Pain Scale: Numbers, Post-Treatment  5/10  -EJ     Pain Location -  Side  Right  -EJ     Pain Location  hip  -EJ     Pain Intervention(s)  Repositioned  -EJ     Row Name 03/03/20 1010          Plan of Care Review    Plan of Care Reviewed With  patient  -EJ     Progress  improving  -EJ     Outcome Summary  Pt doing well, presents POD1 R THR. She now has post op pain, weakness, and decreased functional mobility. Pt plans to return home today with assist of family. Pt able to tolerate all hip exercises well, ambulate in hallway, and negotiate steps without difficulty. Pt also edcuated on hip precuations. No further concerns at this time.   -EJ     Row Name 03/03/20 1010          Physical Therapy Clinical Impression    Patient/Family Goals Statement (PT Clinical Impression)  home today  -EJ     Row Name 03/03/20 1010          Positioning and Restraints    Pre-Treatment Position  in bed  -EJ     Post Treatment Position  chair  -EJ     In Chair  notified nsg;reclined;call light within reach;encouraged to call for assist;exit alarm on  -EJ       User Key  (r) = Recorded By, (t) = Taken By, (c) = Cosigned By    Initials Name Provider Type    Juana Landry, PT Physical Therapist        Outcome Measures     Row Name 03/03/20 1012          How much help from another person do you currently need...    Turning from your back to your side while in flat bed without using bedrails?  4  -EJ     Moving from lying on back to sitting on the side of a flat bed without bedrails?  3  -EJ     Moving to and from a bed to a chair (including a wheelchair)?  3  -EJ     Standing up from a chair using your arms (e.g., wheelchair, bedside chair)?  3  -EJ     Climbing 3-5 steps with a railing?  3  -EJ     To walk in hospital room?  3  -EJ     AM-PAC 6 Clicks Score (PT)  19  -EJ     Row Name 03/03/20 1012          Functional Assessment    Outcome Measure Options  AM-PAC 6 Clicks Basic Mobility (PT)  -EJ       User Key  (r) = Recorded By, (t) = Taken By, (c) = Cosigned By    Initials Name Provider Type    DASHAWN  Juana Hernández, PT Physical Therapist          PT Recommendation and Plan     Outcome Summary/Treatment Plan (PT)  Anticipated Discharge Disposition (PT): home with home health, home with assist  Plan of Care Reviewed With: patient  Progress: improving  Outcome Summary: Pt doing well, presents POD1 R THR. She now has post op pain, weakness, and decreased functional mobility. Pt plans to return home today with assist of family. Pt able to tolerate all hip exercises well, ambulate in hallway, and negotiate steps without difficulty. Pt also edcuated on hip precuations. No further concerns at this time.      Time Calculation:   PT Charges     Row Name 03/03/20 1014             Time Calculation    Start Time  0949  -EJ      Stop Time  1006  -EJ      Time Calculation (min)  17 min  -EJ      PT Received On  03/03/20  -EJ         Time Calculation- PT    Total Timed Code Minutes- PT  10 minute(s)  -EJ        User Key  (r) = Recorded By, (t) = Taken By, (c) = Cosigned By    Initials Name Provider Type    EJ Juana Hernández, PT Physical Therapist        Therapy Charges for Today     Code Description Service Date Service Provider Modifiers Qty    10869841399  PT EVAL MOD COMPLEXITY 2 3/3/2020 Juana Hernández, PT GP 1    [id number]  PT THER PROC EA 15 MIN 3/3/2020 Juana Hernández, PT GP 1          PT G-Codes  Outcome Measure Options: AM-PAC 6 Clicks Basic Mobility (PT)  AM-PAC 6 Clicks Score (PT): 19    PT Discharge Summary  Anticipated Discharge Disposition (PT): home with home health, home with assist  Reason for Discharge: Discharge from facility  Discharge Destination: Home with assist, Home with home health    Juana Hernández, PT  3/3/2020

## 2020-03-03 NOTE — DISCHARGE PLACEMENT REQUEST
"Rhonda Davis (60 y.o. Female)     Date of Birth Social Security Number Address Home Phone MRN    1960  [id number]  [place] [address] [phone] [id number]    Episcopalian Marital Status          Anabaptism        Admission Date Admission Type Admitting Provider Attending Provider Department, Room/Bed    3/2/20 Elective Luis M Leonard MD Makk, Stephen P, MD [address], P892/1    Discharge Date Discharge Disposition Discharge Destination         [place] [place]              Attending Provider:  Luis M Leonard MD    Allergies:  Benadryl [Diphenhydramine], Erythromycin, Levaquin [Levofloxacin], Penicillins    Isolation:  None   Infection:  None   Code Status:  CPR    Ht:  165.1 cm (65\")   Wt:  71.4 kg (157 lb 6 oz)    Admission Cmt:  None   Principal Problem:  Arthritis of right hip [M16.11] More...                 Active Insurance as of 3/2/2020     Primary Coverage     Payor Plan Insurance Group Employer/Plan Group    [employer] [facility] [employer] [facility] PPO [id number]     Payor Plan Address Payor Plan Phone Number Payor Plan Fax Number Effective Dates    [address] [phone]  7/15/2009 - None Entered    [place] [address]       Subscriber Name Subscriber Birth Date Member ID       RHONDA DAVIS 1960 [id number]                 Emergency Contacts      (Rel.) Home Phone Work Phone Mobile Phone    FAMILIA DAVIS (Brother) -- -- [phone]    Cruz Landry (Spouse) [phone] -- [phone]          "

## 2020-03-03 NOTE — PROGRESS NOTES
"    Orthopedic Total Joint Progress Note        Patient: Marylu Georges    Date of Admission: 3/2/2020 10:25 AM    YOB: 1960    Medical Record Number: [id number]    Attending Physician: Luis M Leonard MD      POD # 1 Day Post-Op Procedure(s) (LRB):  RIGHT TOTAL HIP ARTHROPLASTY NATALY NAVIGATION (Right)       Systemic or Specific Complaints: No Complaints      Allergies:   Allergies   Allergen Reactions   • Benadryl [Diphenhydramine] Itching and Other (See Comments)     INCREASES BLOOD PRESSURE   • Erythromycin GI Intolerance   • Levaquin [Levofloxacin] GI Intolerance   • Penicillins GI Intolerance       Medications:   Current Medications:  Scheduled Meds:  apixaban 2.5 mg Oral Q12H   letrozole 2.5 mg Oral Daily   metoprolol succinate XL 25 mg Oral Daily   mupirocin  Each Nare BID   polyethylene glycol 17 g Oral Daily   senna-docusate sodium 2 tablet Oral BID     Continuous Infusions:  lactated ringers 100 mL/hr Last Rate: Stopped (03/03/20 0115)   lactated ringers 9 mL/hr Last Rate: Stopped (03/03/20 0116)     PRN Meds:.•  acetaminophen  •  bisacodyl  •  bisacodyl  •  Glycerin-Hypromellose-  •  HYDROcodone-acetaminophen  •  HYDROcodone-acetaminophen  •  HYDROmorphone **AND** naloxone  •  magnesium hydroxide  •  meclizine  •  ondansetron **OR** ondansetron  •  promethazine **OR** promethazine  •  promethazine      Physical Exam: 60 y.o. female   Wt Readings from Last 3 Encounters:   03/02/20 71.4 kg (157 lb 6 oz)   02/24/20 72.5 kg (159 lb 12.8 oz)   02/18/20 72.2 kg (159 lb 3 oz)     Ht Readings from Last 3 Encounters:   03/02/20 165.1 cm (65\")   02/24/20 162.6 cm (64\")   02/18/20 167.6 cm (66\")     Body mass index is 26.19 kg/m².    Vitals:    03/02/20 2313 03/02/20 2315 03/03/20 0327 03/03/20 0641   BP: 92/59  91/59 98/56   BP Location: Right arm  Right arm Right arm   Patient Position: Lying  Lying Lying   Pulse: 76  87 83   Resp: 16  16 16   Temp: (!) 100.7 °F (38.2 °C)  Comment: rn " called 99.3 °F (37.4 °C)  Comment: after patient used IS 99.5 °F (37.5 °C) 99.2 °F (37.3 °C)   TempSrc: Oral Oral Oral Oral   SpO2: 97%  94% 94%   Weight:       Height:            General Appearance:    General: alert and oriented         Abdomen/:     soft non-tender, non-distended, voiding without difficulty       Extremities:   Operative extremity neurovascular status intact. ROM appropriate.  Incision intact w/out signs or symptoms of infection.  No cyanosis, calf is soft and nontender.     Activity: Mobilizing Per P.T.   Weight Bearing: As Tolerated    Diagnostic Tests:   Admission on 03/02/2020   Component Date Value Ref Range Status   • Hemoglobin 03/03/2020 9.1* 12.0 - 15.9 g/dL Final   • Hematocrit 03/03/2020 27.0* 34.0 - 46.6 % Final       Imaging Results (Last 72 Hours)     Procedure Component Value Units Date/Time    XR Hip 1 View Without Pelvis Right (Surgery Only) [[id number]] Collected:  03/02/20 1521     Updated:  03/02/20 1531    Narrative:       XR HIP 1 VIEW WO PELVIS RIGHT-  03/02/2020     HISTORY: Status post right hip arthroplasty.     The acetabular and proximal femoral components of the right hip  prosthesis are well-seated with no abnormal surrounding bony lucencies.  Soft tissue air is seen. No fractures or other unexpected findings are  noted.       Impression:       Satisfactory postoperative appearance of the right hip.     This report was finalized on 3/2/2020 3:28 PM by Dr. Anatoliy Hughes M.D.             Personally viewed ortho images and report     Assessment:  Doing well 1 Day Post-Op following total joint replacement  Acute Blood Loss Anemia, stable  Post-operative Pain  Limited mobility, requires use of walker and assistance when OOB.    Patient Active Problem List   Diagnosis   • Malignant neoplasm of overlapping sites of left breast in female, estrogen receptor positive (CMS/HCC)   • Anemia in neoplastic disease   • Axillary mass, right   • Family history of breast cancer  in female   • Syncope   • Malignant neoplasm of overlapping sites of both breasts in female, estrogen receptor positive (CMS/HCC)   • Essential hypertension   • Acute radiation dermatitis   • Arthritis of right hip        Plan:    Consults: none  Continue to monitor labs and/or v/s, for tolerance to post op blood loss.  Continue efforts to increase mobilization.  Continue Pain Control Measures.  Continue incisional Care.  DVT prophylaxis.  Follow up in office with Luis M Leonard M.D. In 2 weeks.    Discharge Plan:today to home, home health and when cleared by physical therapy as safe for discharge    Date: 3/3/2020  Rosaura Zamorano, APRN

## 2020-03-03 NOTE — PROGRESS NOTES
Pharmacy Services-Eliquis Counseling    Marylu Georges is a new start on Eliquis for VTE prophylaxis after SONA. Counseling points included the followin) Eliquis' indication, patient's need for the medication, and dosing/frequency.  2) Enforced the importance of taking Eliquis as instructed every day, and that it is a twice a day medication.  3) Explained possible side effects of Eliquis therapy, including increased risk of bleeding, s/sx of bleeding, and increase in cold intolerance. Also talked about ways to control bleeding for minor cuts and scrapes.  4) Emphasized the importance of going to the emergency room if any of the following occur: Falling and hitting your head; noticing bright red blood in urine or dark/tarry stools; vomiting up blood or vomit has a coffee-ground like texture; coughing up blood.  5) Discussed the importance of informing any physician or dentist that they have been started on Eliquis, in case they need to be taken off for a procedure.  6) Discussed all important drug interactions, including over-the-counter medications and supplements.  7) Instructed the patient not to begin or discontinue any medications without informing his physician/pharmacist.    Patient expressed understanding and had no further questions.       Anjali Ernst, Pharm.D., [facility]  Clinical Staff Pharmacist

## 2020-03-03 NOTE — PLAN OF CARE
Problem: Patient Care Overview  Goal: Plan of Care Review  Flowsheets (Taken 3/2/2020 1918)  Progress: improving  Plan of Care Reviewed With: patient; family  Outcome Summary: S/P R SONA. A&Ox4. Admitted to room 892 from PACU. Dressing to right hip c/d/i. Denies any pain at this time. Good sensation and motion to ble. Orientated to room and call light. DTV. Has not been out of bed at this time. Educated on fall precautions, use of IS, and pain  management. VSS.

## 2020-03-03 NOTE — PLAN OF CARE
Problem: Patient Care Overview  Goal: Plan of Care Review  Outcome: Outcome(s) achieved  Flowsheets  Taken 3/3/2020 1010 by Juana Hernández PT  Progress: improving  Plan of Care Reviewed With: patient  Taken 3/3/2020 1052 by David Garcia RN  Outcome Summary: POD #1 of R OSNA. A&ox4. C/o pain this am and tolerating one norco for pain. Up with therapy this am  to chair and bathroom. Voiding on her own. Good sensation and motion to ble. Dressing c/d/i. IV discontinued with tip intact. Tolerated it well. Patient being discharged to home with [facility] and family care. Discharge instructions given and patient verbalize understanding. VSS and belongings with patient. Awaiting pharmacy to bring medications to patient for discharge.

## 2020-03-03 NOTE — PLAN OF CARE
Problem: Patient Care Overview  Goal: Plan of Care Review  Flowsheets (Taken 3/3/2020 1010)  Progress: improving  Plan of Care Reviewed With: patient  Outcome Summary: Pt doing well, presents POD1 R THR. She now has post op pain, weakness, and decreased functional mobility. Pt plans to return home today with assist of family. Pt able to tolerate all hip exercises well, ambulate in hallway, and negotiate steps without difficulty. Pt also edcuated on hip precuations. No further concerns at this time.

## 2020-03-03 NOTE — THERAPY DISCHARGE NOTE
Acute Care - Occupational Therapy Initial Eval/Discharge  [facility]     Patient Name: Marylu Georges  : 1960  MRN: [id number]  Today's Date: 3/3/2020               Admit Date: 3/2/2020       ICD-10-CM ICD-9-CM   1. Arthritis of right hip M16.11 716.95     Patient Active Problem List   Diagnosis   • Malignant neoplasm of overlapping sites of left breast in female, estrogen receptor positive (CMS/HCC)   • Anemia in neoplastic disease   • Axillary mass, right   • Family history of breast cancer in female   • Syncope   • Malignant neoplasm of overlapping sites of both breasts in female, estrogen receptor positive (CMS/HCC)   • Essential hypertension   • Acute radiation dermatitis   • Arthritis of right hip     Past Medical History:   Diagnosis Date   • Anemia in neoplastic disease    • Arthritis    • Breast cancer (CMS/HCC)     Left   • CVA (cerebral vascular accident) (CMS/HCC)    • Hip pain     RIGHT HIP... CYST   • History of fracture of leg    • History of radiation therapy     LAST TREATMENT     • Hypertension    • Limited joint range of motion (ROM)     RIGHT HIP   • Skin sore     OPEN SORE LEFT BREAST   • Syncope    • Vertigo      Past Surgical History:   Procedure Laterality Date   • AXILLARY LYMPH NODE BIOPSY/EXCISION Right     LYMPH NODE UNDER RIGHT ARM-MALIGNANT (DOUBLE MASTECTOMY)   • BREAST BIOPSY Left     MALIGNANT   • FRACTURE SURGERY      Leg   • HARDWARE REMOVAL     • MASTECTOMY W/ SENTINEL NODE BIOPSY Bilateral 2019    Procedure: BILATERLA MODIFIED RADICAL MASTECTOMY WITH BILATERAL SENTINEL LYMPH NODE BIOPSY;  Surgeon: Joby Barron Jr., MD;  Location: [facility];  Service: General   • TOTAL HIP ARTHROPLASTY Right 3/2/2020    Procedure: RIGHT TOTAL HIP ARTHROPLASTY NATALY LIV;  Surgeon: Luis M Leonard MD;  Location: [facility];  Service: Orthopedics;  Laterality: Right;   • VENOUS ACCESS DEVICE (PORT) INSERTION Right 2019    Procedure: INSERTION  VENOUS ACCESS DEVICE;  Surgeon: Joby Barron Jr., MD;  Location:  [facility];  Service: General   • VENOUS ACCESS DEVICE (PORT) REMOVAL N/A 10/30/2019    Procedure: Mediport Removal;  Surgeon: Joby Barron Jr., MD;  Location: [facility];  Service: General          OT ASSESSMENT FLOWSHEET (last 12 hours)      Occupational Therapy Evaluation     Row Name 03/03/20 0818                   OT Evaluation Time/Intention    Subjective Information  complains of;pain  -SK        Document Type  discharge evaluation/summary;evaluation  -SK        Mode of Treatment  individual therapy;occupational therapy  -SK        Patient Effort  good  -SK        Symptoms Noted During/After Treatment  increased pain  -SK           General Information    Patient Profile Reviewed?  yes  -SK        Prior Level of Function  independent:  -SK        Existing Precautions/Restrictions  hip, posterior;right  -SK        Barriers to Rehab  none identified  -SK           Cognitive Assessment/Intervention- PT/OT    Orientation Status (Cognition)  oriented x 4  -SK        Follows Commands (Cognition)  WNL  -SK           Mobility Assessment/Treatment    Extremity Weight-bearing Status  right lower extremity  -SK        Right Lower Extremity (Weight-bearing Status)  weight-bearing as tolerated (WBAT)  -SK           Bed Mobility Assessment/Treatment    Bed Mobility Assessment/Treatment  supine-sit  -SK        Supine-Sit [place] (Bed Mobility)  minimum assist (75% patient effort)  -SK        Assistive Device (Bed Mobility)  head of bed elevated  -SK           Functional Mobility    Functional Mobility- Ind. Level  contact guard assist;verbal cues required  -SK        Functional Mobility- Device  rolling walker  -SK        Functional Mobility-Distance (Feet)  20  -SK        Functional Mobility- Comment  vcs for walker safety during toilet transfer   -SK           Transfer Assessment/Treatment    Transfer Assessment/Treatment  sit-stand  transfer;stand-sit transfer;toilet transfer  -SK           Sit-Stand Transfer    Sit-Stand [place] (Transfers)  verbal cues;stand by assist  -SK        Assistive Device (Sit-Stand Transfers)  walker, front-wheeled  -SK           Toilet Transfer    Type (Toilet Transfer)  sit-stand;stand-sit  -SK        [place] Level (Toilet Transfer)  stand by assist  -SK        Assistive Device (Toilet Transfer)  commode;walker, front-wheeled  -SK           ADL Assessment/Intervention    BADL Assessment/Intervention  bathing;upper body dressing;lower body dressing;grooming;toileting  -SK           Lower Body Dressing Assessment/Training    Lower Body Dressing [place] Level  moderate assist (50% patient effort)  -SK        Lower Body Dressing Position  edge of bed sitting  -SK        Comment (Lower Body Dressing)  recommend using reacher/sock aid at home, pt states she will have assist so does not break precautions   -SK           Grooming Assessment/Training    [place] Level (Grooming)  set up;grooming skills;oral care regimen;wash face, hands;supervision  -SK        Grooming Position  unsupported standing;supported standing;sink side  -SK        Comment (Grooming)  standing at sink to perform   -SK           Toileting Assessment/Training    [place] Level (Toileting)  toileting skills;adjust/manage clothing;perform perineal hygiene;contact guard assist;verbal cues  -SK        Assistive Devices (Toileting)  commode;grab bar/safety frame  -SK        Toileting Position  supported sitting;supported standing  -SK        Comment (Toileting)  vcs for  safety   -SK           General ROM    GENERAL ROM COMMENTS  BUE WFL   -SK           MMT (Manual Muscle Testing)    General MMT Comments  grossly 4/5 BUE   -SK           Motor Assessment/Interventions    Additional Documentation  Balance (Group);Balance Interventions (Group)  -SK           Balance    Balance  static standing balance;dynamic standing balance;static  sitting balance  -SK           Static Sitting Balance    Level of [place] (Unsupported Sitting, Static Balance)  independent  -SK        Sitting Position (Unsupported Sitting, Static Balance)  sitting on edge of bed  -SK        Time Able to Maintain Position (Unsupported Sitting, Static Balance)  3 to 4 minutes  -SK           Static Standing Balance    Level of [place] (Supported Standing, Static Balance)  supervision  -SK        Time Able to Maintain Position (Supported Standing, Static Balance)  2 to 3 minutes  -SK        Assistive Device Utilized (Supported Standing, Static Balance)  walker, rolling  -SK        Comment (Supported Standing, Static Balance)  sinkside  -SK           Sensory Assessment/Intervention    Sensory General Assessment  no sensation deficits identified  -SK           Positioning and Restraints    Pre-Treatment Position  in bed  -SK        Post Treatment Position  chair  -SK        In Chair  notified nsg;exit alarm on;encouraged to call for assist;call light within reach  -SK           Pain Scale: Numbers Pre/Post-Treatment    Pain Scale: Numbers, Pretreatment  4/10  -SK        Pain Scale: Numbers, Post-Treatment  5/10  -SK        Pain Location - Side  Right  -SK        Pain Location  hip  -SK        Pain Intervention(s)  Medication (See MAR);Repositioned;Rest  -SK           Wound Right posterior hip Incision    Wound - Properties Group Side: Right  -HH Orientation: posterior  -HH Location: hip  -HH Primary Wound Type: Incision  -HH       Plan of Care Review    Plan of Care Reviewed With  patient  -SK           Clinical Impression (OT)    OT Diagnosis  pt POD1 R THR with decreased ADLs, increased pain, decreased functional mobility   -SK        Functional Level at Time of Evaluation (OT Eval)  Mod A LBD, CGA toilet transfer, toileting CGA, set-up grooming, Min A bed mobility assist with LLE   -SK        Patient/Family Goals Statement (OT Eval)  return home and to OF   -SK         Criteria for Skilled Therapeutic Interventions Met (OT Eval)  no problems identified which require skilled intervention  -SK        Care Plan Review (OT)  evaluation/treatment results reviewed;care plan/treatment goals reviewed;patient/other agree to care plan  -SK        Anticipated Equipment Needs at Discharge (OT)  reacher  -SK        Anticipated Discharge Disposition (OT)  home with assist  -SK           Patient Education Goal (OT)    Activity (Patient Education Goal, OT)  edu pt on DME and AE and safety with ADLs  -SK        [place]/Cues/Accuracy (Memory Goal 2, OT)  demonstrates adequately;verbalizes understanding  -SK        Time Frame (Patient Education Goal, OT)  long term goal (LTG)  -SK        Progress/Outcome (Patient Education Goal, OT)  goal met  -SK           Discharge Summary (Occupational Therapy)    Additional Documentation  Discharge Summary, OT Eval (Group)  -SK           Discharge Summary, OT Eval    Reason for Discharge (OT Discharge Summary)  no further needs identified;patient met all goals and outcomes  -SK          User Key  (r) = Recorded By, (t) = Taken By, (c) = Cosigned By    Initials Name Effective Dates    HH Joceline Hardy RN 06/16/16 -     SK Zulma Foster, OT 02/25/19 -               OT Recommendation and Plan  Outcome Summary/Treatment Plan (OT)  Anticipated Equipment Needs at Discharge (OT): reacher  Anticipated Discharge Disposition (OT): home with assist  Reason for Discharge (OT Discharge Summary): no further needs identified, patient met all goals and outcomes  Plan of Care Review  Plan of Care Reviewed With: patient  Plan of Care Reviewed With: patient     Rehab Goal Summary     Row Name 03/03/20 0818             Patient Education Goal (OT)    Activity (Patient Education Goal, OT)  edu pt on DME and AE and safety with ADLs  -SK      [place]/Cues/Accuracy (Memory Goal 2, OT)  demonstrates adequately;verbalizes understanding  -SK      Time Frame (Patient  Education Goal, OT)  long term goal (LTG)  -SK      Progress/Outcome (Patient Education Goal, OT)  goal met  -SK        User Key  (r) = Recorded By, (t) = Taken By, (c) = Cosigned By    Initials Name Provider Type Discipline    Zulma Vaughan OT Occupational Therapist OT          Outcome Measures     Row Name 03/03/20 1200             How much help from another is currently needed...    Putting on and taking off regular lower body clothing?  3  -SK      Bathing (including washing, rinsing, and drying)  3  -SK      Toileting (which includes using toilet bed pan or urinal)  3  -SK      Putting on and taking off regular upper body clothing  3  -SK      Taking care of personal grooming (such as brushing teeth)  4  -SK      Eating meals  4  -SK      AM-PAC 6 Clicks Score (OT)  20  -SK         Functional Assessment    Outcome Measure Options  AM-PAC 6 Clicks Daily Activity (OT)  -SK        User Key  (r) = Recorded By, (t) = Taken By, (c) = Cosigned By    Initials Name Provider Type    Zulma Vaughan OT Occupational Therapist          Time Calculation:   Time Calculation- OT     Row Name 03/03/20 1246             Time Calculation- OT    OT Start Time  0818  -SK      OT Stop Time  0846  -SK      OT Time Calculation (min)  28 min  -SK      Total Timed Code Minutes- OT  23 minute(s)  -SK      OT Received On  03/03/20  -SK      OT Goal Re-Cert Due Date  03/17/20  -SK        User Key  (r) = Recorded By, (t) = Taken By, (c) = Cosigned By    Initials Name Provider Type    Zulma Vaughan OT Occupational Therapist        Therapy Suggested Charges     Code   Minutes Charges    None           Therapy Charges for Today     Code Description Service Date Service Provider Modifiers Qty    [id number]  OT EVAL MOD COMPLEXITY 2 3/3/2020 Zulma Fsoter OT GO 1    [id number]  OT SELF CARE/MGMT/TRAIN EA 15 MIN 3/3/2020 Zulma Foster OT GO 2               OT Discharge Summary  Anticipated Discharge Disposition (OT): home  with assist    Zulma Foster, OT  3/3/2020

## 2020-03-11 ENCOUNTER — TELEPHONE (OUTPATIENT)
Dept: ORTHOPEDIC SURGERY | Facility: CLINIC | Age: 60
End: 2020-03-11

## 2020-03-11 DIAGNOSIS — Z96.641 STATUS POST RIGHT HIP REPLACEMENT: Primary | ICD-10-CM

## 2020-03-16 ENCOUNTER — OFFICE VISIT (OUTPATIENT)
Dept: ORTHOPEDIC SURGERY | Facility: CLINIC | Age: 60
End: 2020-03-16

## 2020-03-16 VITALS — HEIGHT: 64 IN | TEMPERATURE: 98.7 F | WEIGHT: 156.8 LBS | BODY MASS INDEX: 26.77 KG/M2

## 2020-03-16 DIAGNOSIS — Z96.641 STATUS POST RIGHT HIP REPLACEMENT: Primary | ICD-10-CM

## 2020-03-16 PROCEDURE — 99024 POSTOP FOLLOW-UP VISIT: CPT | Performed by: NURSE PRACTITIONER

## 2020-03-16 PROCEDURE — 73502 X-RAY EXAM HIP UNI 2-3 VIEWS: CPT | Performed by: NURSE PRACTITIONER

## 2020-03-16 NOTE — PATIENT INSTRUCTIONS
DIAGNOSIS: 2 week follow up right total hip     SUBJECTIVE:Patient returns today for 2 week follow up of right total hip replacement. Patient reports doing well with no unusual complaints. Appears to be progressing appropriately.    OBJECTIVE:   Exam:. The incision is healing appropriately. No sign of infection. Range of motion is progressing as expected. The calf is soft and nontender with a negative Homans sign.    DIAGNOSTIC STUDIES  2V AP&Lat of the right hip were done in the office today  Indication, findings and comparison: images were reviewed for evaluation of recent hip replacement. They demonstrate a well positioned, well aligned hip replacement without complicating factors noted. In comparison with previous films there has been interval implant placement.    ASSESSMENT: 2 week status post right hip replacement expected healing.    PLAN: 1) Incision open to air.   2) Order given for PT and pain medicine (as needed)   3)Continue ice PRN   4) Continue Eliquis 2.5mg by mouth twice a day until all gone then take a once a day aspirin for 2 more weeks until you are 6 weeks out from surgery. Diclofenac gel with precautions given for her knees at this point, cautioned d/t blood thinner. Verb understanding.    5) Follow up in 4 weeks with repeat Xrays of right hip (2 views)   6) Wait for 3 months after surgery for routine teeth cleaning and continue with taking a dose of antibiotics before dental procedures for 2 yrs post total joint replacement.    Rosaura Zamorano, APRN  3/16/2020

## 2020-03-16 NOTE — PROGRESS NOTES
Marylu Georges : 1960 MRN: [id number] DATE: 3/16/2020  Body mass index is 26.91 kg/m².  Vitals:    20 1040   Temp: 98.7 °F (37.1 °C)         DIAGNOSIS: 2 week follow up right total hip     SUBJECTIVE:Patient returns today for 2 week follow up of right total hip replacement. Patient reports doing well with no unusual complaints. Appears to be progressing appropriately.    OBJECTIVE:   Exam:. The incision is healing appropriately. No sign of infection. Range of motion is progressing as expected. The calf is soft and nontender with a negative Homans sign.    DIAGNOSTIC STUDIES  2V AP&Lat of the right hip were done in the office today  Indication, findings and comparison: images were reviewed for evaluation of recent hip replacement. They demonstrate a well positioned, well aligned hip replacement without complicating factors noted. In comparison with previous films there has been interval implant placement.    ASSESSMENT: 2 week status post right hip replacement expected healing.    PLAN: 1) Incision open to air.   2) Order given for PT and pain medicine (as needed)   3)Continue ice PRN   4) Continue Eliquis 2.5mg by mouth twice a day until all gone then take a once a day aspirin for 2 more weeks until you are 6 weeks out from surgery. Diclofenac gel with precautions given for her knees at this point, cautioned d/t blood thinner. Verb understanding.    5) Follow up in 4 weeks with repeat Xrays of right hip (2 views)   6) Wait for 3 months after surgery for routine teeth cleaning and continue with taking a dose of antibiotics before dental procedures for 2 yrs post total joint replacement.    Rosaura Zamorano, APRN  3/16/2020

## 2020-03-17 ENCOUNTER — TREATMENT (OUTPATIENT)
Dept: PHYSICAL THERAPY | Facility: CLINIC | Age: 60
End: 2020-03-17

## 2020-03-17 DIAGNOSIS — R26.9 GAIT ABNORMALITY: ICD-10-CM

## 2020-03-17 DIAGNOSIS — Z96.641 HISTORY OF TOTAL RIGHT HIP REPLACEMENT: Primary | ICD-10-CM

## 2020-03-17 PROCEDURE — 97161 PT EVAL LOW COMPLEX 20 MIN: CPT | Performed by: PHYSICAL THERAPIST

## 2020-03-17 PROCEDURE — 97110 THERAPEUTIC EXERCISES: CPT | Performed by: PHYSICAL THERAPIST

## 2020-03-17 NOTE — PROGRESS NOTES
Physical Therapy Initial Evaluation and Plan of Care    Patient: Marylu Georges   : 1960  Diagnosis/ICD-10 Code:  History of total right hip replacement [Z96.641]  Referring practitioner: ISAIAS Sibley  Date of Initial Evaluation:  Type: THERAPY  Noted: 3/17/2020    Subjective Evaluation    History of Present Illness  Date of surgery: 3/2/2020  Mechanism of injury: Patient is a 61 y/o female who presents to outpatient physical therapy s/p THR. She states that overall she is doing well and does not really need her cane but uses it for safety.  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      Recent medical history: breast cancer, chemo, radiation and double mastectomy 2019.     Quality of life: good    Pain  Current pain ratin  Quality: muscle spasms.  Alleviating factors: OTC PRN.  Aggravating factors: squatting, ambulation, stairs and standing  Progression: improved    Social Support  Lives in: apartment  Lives with: spouse    Treatments  Previous treatment: home therapy and medication  Current treatment: medication  Patient Goals  Patient goals for therapy: increased strength and decreased pain             Objective       Palpation   Left   Hypertonic in the gluteus jim, lumbar paraspinals and quadratus lumborum.   Tenderness of the gluteus jim, lumbar paraspinals and quadratus lumborum.     Tenderness     Left Hip   Tenderness in the sacroiliac joint.     Additional Tenderness Details  Right leg 2 cm longer    Lumbar Screen  Lumbar range of motion within normal limits.    Active Range of Motion   Left Hip   Normal active range of motion    Right Hip   Normal active range of motion    Strength/Myotome Testing     Left Hip   Planes of Motion   Abduction:  4-  Adduction: 4-  External rotation: 4-  Internal rotation: 4-    Right Hip   Planes of Motion   Abduction: 5  Adduction: 5  External rotation: 5  Internal rotation: 5    Ambulation   Weight-Bearing Status   Weight-Bearing Status (Left): full weight bearing   Weight-Bearing Status (Right): full weight-bearing    Assistive device used: single point cane             Assessment & Plan     Assessment  Impairments: abnormal gait, abnormal or restricted ROM, activity intolerance, lacks appropriate home exercise program and pain with function  Assessment details: Patient is a 59 y/o female who presents s/p right hip replacement approximately 2 weeks.  She has full AROM of Right hip, decreased hip abduction and adduction, extension and flexion 3+/5 as opposed to left 5/5.  Right leg 2 cm longer.  Increased muscle guarding/spasming of right lumbar paraspinals.   Prognosis: good  Functional Limitations: walking and uncomfortable because of pain  Goals  Plan Goals: Long Term Goals (12 weeks)    Patient is able to return to work/community activities with minimal to no discomfort  Patient is able to lift 25 lbs from floor to waist  Patient is able stand for as long as needed for work/community related tasks.   Patient is able to sit for as long as needed for work/community related tasks.   Patient is able to reciprocally climb steps as needed for daily tasks.   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   Patient has optimal gait pattern.       Short Term Goals (4 weeks)    Patient is independent with HEP  Patient is able to sleep without being disrupted by pain.   Patient has greater than 50% improvement overall.    Patient has minimal to no tenderness to palpation.    Patient is able to negotiate uneven terrain without use of assistive device.       Plan  Therapy options: will be seen for skilled physical therapy services  Planned modality interventions: TENS, ultrasound,  thermotherapy (hydrocollator packs) and cryotherapy  Planned therapy interventions: manual therapy, soft tissue mobilization, strengthening, stretching, therapeutic activities, joint mobilization, home exercise program, functional ROM exercises, flexibility and body mechanics training  Frequency: 24 visits.   Treatment plan discussed with: patient        Manual Therapy:    0     mins  48308;  Therapeutic Exercise:    20     mins  08334;     Neuromuscular Hubert:    0    mins  52770;    Therapeutic Activity:     0     mins  34110;     Gait Trainin     mins  23783;     Ultrasound:     0     mins  44013;    Work Hardening           0      mins 39813  Iontophoresis               0   mins 42369    Timed Treatment:   20   mins   Total Treatment:     55   mins    PT SIGNATURE: Carmina Nguyen, PT   DATE TREATMENT INITIATED: 3/17/2020    Initial Certification  Certification Period: 6/15/2020  I certify that the therapy services are furnished while this patient is under my care.  The services outlined above are required by this patient, and will be reviewed every 90 days.     PHYSICIAN: Rosaura Zamorano APRN      DATE:     Please sign and return via fax to [phone].. Thank you, [facility].

## 2020-03-20 ENCOUNTER — TREATMENT (OUTPATIENT)
Dept: PHYSICAL THERAPY | Facility: CLINIC | Age: 60
End: 2020-03-20

## 2020-03-20 DIAGNOSIS — R26.9 GAIT ABNORMALITY: ICD-10-CM

## 2020-03-20 DIAGNOSIS — Z96.641 HISTORY OF TOTAL RIGHT HIP REPLACEMENT: Primary | ICD-10-CM

## 2020-03-20 PROCEDURE — 97110 THERAPEUTIC EXERCISES: CPT | Performed by: PHYSICAL THERAPIST

## 2020-03-20 NOTE — PROGRESS NOTES
"Physical Therapy Daily Progress Note    Visit # 2    Marylu Georges reports: \"My hip doesn't bother me as much as my back.  It has been the problem since day one and gives me spasms. Getting up out of bed is the worst time for my back, but I am feeling pretty good right now. It's getting easier to get my shoes and socks on.\"     Subjective     Objective   See Exercise, Manual, and Modality Logs for complete treatment.   *Initiated glut sets, supine alt clamshells, and supine hip abduction  *Modified corner stretch to doorway stretch    Assessment & Plan     Assessment  Assessment details: Patient reports lumbar symptoms are more limiting than (R) hip symptoms at this point, though external rotation and abduction AROM remain limited and uncomfortable. She is able to progress basic ROM and strengthening activities to facilitate improved lumbar symptoms and (R) LE ROM and strength.  Gait deficits with compensation patterns are evident.        Progress strengthening /stabilization /functional activity         Manual Therapy:         mins  31419;  Therapeutic Exercise:    46     mins  54310;     Neuromuscular Hubert:        mins  44524;    Therapeutic Activity:          mins  49461;     Gait Training:           mins  61610;     Ultrasound:          mins  54837;    Electrical Stimulation:         mins  69310 ( );  Dry Needling          mins self-pay    Timed Treatment:   46   mins   Total Treatment:     48   mins    Dede Jhaveri PT, DPT  Physical Therapist  [place] License # [id number]    "

## 2020-03-24 RX ORDER — METOPROLOL SUCCINATE 25 MG/1
TABLET, EXTENDED RELEASE ORAL
Qty: 90 TABLET | OUTPATIENT
Start: 2020-03-24

## 2020-04-14 ENCOUNTER — OFFICE VISIT (OUTPATIENT)
Dept: ONCOLOGY | Facility: CLINIC | Age: 60
End: 2020-04-14

## 2020-04-14 ENCOUNTER — APPOINTMENT (OUTPATIENT)
Dept: LAB | Facility: HOSPITAL | Age: 60
End: 2020-04-14

## 2020-04-14 ENCOUNTER — TELEPHONE (OUTPATIENT)
Dept: ONCOLOGY | Facility: CLINIC | Age: 60
End: 2020-04-14

## 2020-04-14 ENCOUNTER — TELEPHONE (OUTPATIENT)
Dept: ORTHOPEDIC SURGERY | Facility: CLINIC | Age: 60
End: 2020-04-14

## 2020-04-14 DIAGNOSIS — I10 ESSENTIAL HYPERTENSION: ICD-10-CM

## 2020-04-14 DIAGNOSIS — C50.812 MALIGNANT NEOPLASM OF OVERLAPPING SITES OF LEFT BREAST IN FEMALE, ESTROGEN RECEPTOR POSITIVE (HCC): Primary | ICD-10-CM

## 2020-04-14 DIAGNOSIS — M16.11 ARTHRITIS OF RIGHT HIP: ICD-10-CM

## 2020-04-14 DIAGNOSIS — L58.0 ACUTE RADIATION DERMATITIS: ICD-10-CM

## 2020-04-14 DIAGNOSIS — Z80.3 FAMILY HISTORY OF BREAST CANCER IN FEMALE: ICD-10-CM

## 2020-04-14 DIAGNOSIS — Z17.0 MALIGNANT NEOPLASM OF OVERLAPPING SITES OF LEFT BREAST IN FEMALE, ESTROGEN RECEPTOR POSITIVE (HCC): Primary | ICD-10-CM

## 2020-04-14 PROBLEM — D63.0 ANEMIA IN NEOPLASTIC DISEASE: Status: RESOLVED | Noted: 2019-01-22 | Resolved: 2020-04-14

## 2020-04-14 PROCEDURE — 99443 PR PHYS/QHP TELEPHONE EVALUATION 21-30 MIN: CPT | Performed by: INTERNAL MEDICINE

## 2020-04-14 RX ORDER — METOPROLOL SUCCINATE 25 MG/1
25 TABLET, EXTENDED RELEASE ORAL DAILY
Qty: 30 TABLET | Refills: 6 | Status: SHIPPED | OUTPATIENT
Start: 2020-04-14 | End: 2021-01-14

## 2020-04-14 NOTE — PROGRESS NOTES
Subjective     REASON FOR FOLLOW UP:  1. 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 R BREAST MASS WITH GIANT R AXILLARY ADENOPATHY.  3. FAMILY HISTORY OF BREAST CANCER IN MOTHER AND SISTER, SISTER WAS TESTED FOR BRCA 1 and 2 AND Were NEGATIVE.File Link     Scan on 4/23/2019 1411 by Ely Dockery: GENETIC TEST RESULTS WITH F/U NOTE [id number] on 4/23/2019 1411 by Ely Dockery: GENETIC TEST RESULTS WITH F/U NOTE 4-2-2019   Key Information     Document ID File Type Document Type Description   [id number] Image GENETICS - SCAN GENETIC TEST RESULTS WITH F/U NOTE 4-2-2019   Import Information     Attached At Date Time User Dept   Encounter Level 4/23/2019  2:11 PM Ely Dockery [facility]   Encounter     Clinical Support on 4/2/19 with GENETIC CLINIC                                            History of Present Illness  This patient has consented verbally to me in regard eVisit today.    In summary the patient has had her right hip replaced a month ago, she could not do too much physical therapy and she has been stuck at home since then due to coronavirus issues. She also has found that she has some stiffness in her shoulders associated with the radiation therapy to her chest wall and she is trying to do exercises for this. On the other hand her blood pressure seems to be that has been stable, she remains on her vitamin D, aspirin and blood pressure medication on a regular basis and she has not had any other episodes of vertigo. Her appetite is good, her bowel activity and urination are normal. She has not had any fever or respiratory problems. The surgical site in the right hip has further improved but she is still taking antiinflammatory medication on a regular basis. She cannot take narcotic medication, it makes her extremely ill. She has not had any issues pertinent to skin alterations. The chest wall has healed after the radiation therapy and has formed some degree of  pigmentation. She has not developed any nodules. She also has had some back pain intermittently related to spasm and is much better when she is able to do some walking outside of her apartment.    She has not had any exposure to anybody who could have coronavirus infection and her  is not even here, he is in [place]. He is not planning to come to [place] anytime soon.                                               Past Medical History:   Diagnosis Date   • Anemia in neoplastic disease    • Arthritis    • Breast cancer (CMS/HCC)     Left   • CVA (cerebral vascular accident) (CMS/HCC)    • Hip pain     RIGHT HIP... CYST   • History of fracture of leg 1987   • History of radiation therapy     LAST TREATMENT     • Hypertension    • Limited joint range of motion (ROM)     RIGHT HIP   • Skin sore     OPEN SORE LEFT BREAST   • Syncope    • Vertigo            Past Surgical History:   Procedure Laterality Date   • AXILLARY LYMPH NODE BIOPSY/EXCISION Right     LYMPH NODE UNDER RIGHT ARM-MALIGNANT (DOUBLE MASTECTOMY)   • BREAST BIOPSY Left     MALIGNANT   • FRACTURE SURGERY  1987    Leg   • HARDWARE REMOVAL     • MASTECTOMY W/ SENTINEL NODE BIOPSY Bilateral 9/16/2019    Procedure: BILATERLA MODIFIED RADICAL MASTECTOMY WITH BILATERAL SENTINEL LYMPH NODE BIOPSY;  Surgeon: Joby Barron Jr., MD;  Location: [facility];  Service: General   • TOTAL HIP ARTHROPLASTY Right 3/2/2020    Procedure: RIGHT TOTAL HIP ARTHROPLASTY NATALY NAVIGATION;  Surgeon: Luis M Leonard MD;  Location: [facility];  Service: Orthopedics;  Laterality: Right;   • VENOUS ACCESS DEVICE (PORT) INSERTION Right 2/1/2019    Procedure: INSERTION VENOUS ACCESS DEVICE;  Surgeon: Joby Barron Jr., MD;  Location: [facility];  Service: General   • VENOUS ACCESS DEVICE (PORT) REMOVAL N/A 10/30/2019    Procedure: Mediport Removal;  Surgeon: Joby Barron Jr., MD;  Location: [facility] OR;  Service: General        Current  Outpatient Medications on File Prior to Visit   Medication Sig Dispense Refill   • acetaminophen (TYLENOL) 500 MG tablet Take 500 mg by mouth Every 6 (Six) Hours As Needed for Mild Pain .     • bisacodyl (DULCOLAX) 5 MG EC tablet Take 1 tablet by mouth Daily As Needed for Constipation. 30 tablet 2   • diclofenac (VOLTAREN) 1 % gel gel Apply pea size amount to area of pain up to four times a day 4 tube 3   • HYDROcodone-acetaminophen (NORCO) 7.5-325 MG per tablet Take 1 tablet by mouth every 3 to 4 hours as needed for severe post op pain, wean as tolerated 84 tablet 0   • letrozole (FEMARA) 2.5 MG tablet Take 1 tablet by mouth Daily. 30 tablet 2   • meclizine (ANTIVERT) 12.5 MG tablet Take 12.5 mg by mouth 3 (Three) Times a Day As Needed for Dizziness.     • metoprolol succinate XL (TOPROL XL) 25 MG 24 hr tablet Take 1 tablet by mouth Daily. 30 tablet 2   • ondansetron ODT (ZOFRAN-ODT) 4 MG disintegrating tablet Take 1 tablet by mouth Every 8 (Eight) Hours As Needed for Nausea or Vomiting. 20 tablet 3   • senna-docusate sodium (SENOKOT-S) 8.6-50 MG tablet Take 2 tablets by mouth 2 (Two) Times a Day. Until your first bowel movement and then daily as needed 120 tablet 0     No current facility-administered medications on file prior to visit.         ALLERGIES:    Allergies   Allergen Reactions   • Benadryl [Diphenhydramine] Itching and Other (See Comments)     INCREASES BLOOD PRESSURE   • Erythromycin GI Intolerance   • Levaquin [Levofloxacin] GI Intolerance   • Penicillins GI Intolerance        Social History     Socioeconomic History   • Marital status:      Spouse name: Cruz   • Number of children: 0   • Years of education: Not on file   • Highest education level: Not on file   Occupational History   • Occupation:      Employer: SELF-EMPLOYED   Social Needs   • Financial resource strain: Not hard at all   • Food insecurity:     Worry: Never true     Inability: Never true   • Transportation  needs:     Medical: No     Non-medical: No   Tobacco Use   • Smoking status: Never Smoker   • Smokeless tobacco: Never Used   Substance and Sexual Activity   • Alcohol use: Not Currently     Alcohol/week: 7.0 standard drinks     Types: 4 Glasses of wine, 3 Cans of beer per week     Frequency: 2-3 times a week     Drinks per session: 1 or 2     Binge frequency: Never   • Drug use: No   • Sexual activity: Not Currently     Partners: Male     Birth control/protection: Post-menopausal   Lifestyle   • Physical activity:     Days per week: 7 days     Minutes per session: 120 min   • Stress: Only a little        Family History   Problem Relation Age of Onset   • Lung cancer Father    • Cancer Mother    • Breast cancer Mother    • Liver disease Mother    • Breast cancer Sister 48   • Breast cancer Maternal Grandmother    • Breast cancer Paternal Grandmother    • Breast cancer Maternal Uncle    • Malig Hyperthermia Neg Hx         Review of Systems   General: no fever, no chills, no fatigue,no weight changes, no lack of appetite.  Eyes: no epiphora, xerophthalmia,conjunctivitis, pain, glaucoma, blurred vision, blindness, secretion, photophobia, proptosis, diplopia.  Ears: no otorrhea, tinnitus, otorrhagia, deafness, pain, vertigo.  Nose: no rhinorrhea, no epistaxis, no alteration in perception of odors, no sinuses pressure.  Mouth: no alteration in gums or teeth,  No ulcers, no difficulty with mastication or deglut ion, no odynophagia.  Neck: no masses or pain, no thyroid alterations, no pain in muscles or arteries, no carotid odynia, no crepitation.  Respiratory: no cough, no sputum production,no dyspnea,no trepopnea, no pleuritic pain,no hemoptysis.  Heart: no syncope, no irregularity, no palpitations, no angina,no orthopnea,no paroxysmal nocturnal dyspnea.  Vascular Venous: no tenderness,no edema,no palpable cords,no postphlebitic syndrome, no skin changes no ulcerations.  Vascular Arterial: no distal ischemia,  noclaudication, no gangrene, no neuropathic ischemic pain, no skin ulcers, no paleness no cyanosis.  GI: no dysphagia, no odynophagia, no regurgitation, no heartburn,no indigestion,no nausea,no vomiting,no hematemesis ,no melena,no jaundice,no distention, no obstipation,no enterorrhagia,no proctalgia,no anal  lesions, no changes in bowel habits.  : no frequency, no hesitancy, no hematuria, no discharge,no  pain.  Musculoskeletal: STATED muscle or tendon pain or inflammation,no  joint pain, no edema,  functional limitation,no fasciculations, no mass.  Neurologic: no headache, no seizures, noalterations on Craneal nerves, no motor deficit, no sensory deficit, normal coordination, no alteration in memory,normal orientation, calculation,normal writting, verbal and written language.  Skin: no rashes,no pruritus no localized lesions.  Psychiatric: no anxiety, no depression,no agitation, no delusions, proper insight.                          Objective     There were no vitals filed for this visit.  Current Status 1/21/2020   ECOG score 0       Physical Exam     NONE PHONE VISIT    The patient states that she has restricted mobility for shoulder range of motion. She also has restricted mobility for right hip range of motion given the recent hip surgery. She was taking anticoagulants for almost 3 weeks, she has discontinued this a week ago, she has not had any symptoms or signs that would indicate vein thrombosis in her lower extremities.                                                     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 At least for the last 4 years, she has had an in crescendo mass in the left breast that has produced a gigantic tumor that is now close to 25 cm in size and is replacing most of the anatomy of the left breast. There are areas of ulceration necrosis and bleeding and the mass is fixed to the chest wall. She has abundant amount of collateral circulation in the left anterior chest wall and it caught my attention the lack of any left axillary adenopathy. She has no lymphedema in the left upper extremity. In the right breast while in sitting position, no palpable abnormalities are documented. The right breast skin and nipple are normal. When the patient lies flat, there is a palpable mass at 9 o'clock from the nipple that measures probably 4 cm in size, very indistinct in regard to edges and margins. There was no mobility and no areas of tenderness. She has a giant adenopathy in the right axilla that measures close to 9 cm in size, uniform, no fluctuation, nontender, completely fixed to the axillary wall. There is no compromise of the skin.         Since the previous visit the patient has completed all of her plan of chemotherapy neoadjuvantly. She has had a very dramatic response not only to the primary tumor in the left breast, her left breast remind ourselves was 3 times the normal size and had an ulcerated mass that was huge with bleeding and necrosis. This has resolved. She had no left axillary adenopathy but she had an induration in the right breast and most importantly she had almost a 7 cm mass in the right axilla. Since completion of chemotherapy all of these issues have resolved near completely. She has had residual mass behind the scar in the left breast that measures close to 4 cm, no  left axillary adenopathy and near complete resolution of the right axillary adenopathy. No nodularity in the right breast.     On 10/15/2019 the patient completed several weeks ago her bilateral mastectomy and axillary lymph node dissection. She had no residual malignancy on any level in the chest wall. There is a tumor that had very dramatic regression in the left breast from almost 25 cm in diameter to 3.5, still positive left axillary lymph nodes. The size of them were smaller than originally thought. The right breast disclosed no primary tumor. She had a tumor located on the CT scan there before and clinically documented. The right axilla disclosed still positive lymph nodes, not surprising she had a large tumoral lesion right there. Now she has nothing left.           The patient had an eVisit for almost 20 minutes on 04/14/2020. She has had her right hip replaced a month ago, she could not do a lot of physical therapy because of all of the issues pertinent to coronavirus and she has been able to do exercises on her own at home. She also has found restriction in the mobility of the shoulders due to radiation therapy and surgery and she is doing exercises for this as well. She has not encountered any other side effects of the letrozole that she takes in the background of adjuvant therapy for her breast cancer.     The patient's blood pressure has been apparently under good control and she remains on her metoprolol everyday.    The patient got sick taking narcotic medication after surgery, she stopped them 3 days after this and went into her antiinflammatory medication that she takes once a day.     So far the patient has stopped her anticoagulant 3 weeks ago for prophylaxis of vein thrombosis after her hip replacement.     I encouraged her to do more physical activity in her upper body and also walking and moving around not only for mental purposes but also for physical purposes. She had instructions in regard  how to do her rehab for her right hip.     I would like to review her back in the office in 2 months with a CBC, CMP and CA 15-3.     I also advised the patient in regard to proper measures in order to minimize coronavirus infection.    Total duration of this visit was 21 minutes 14 seconds.     I DISCUSSED WITH PATIENT IN DETAIL FORMS TO DECREASE CHANCES OF CORONAVIRUS INFECTION INCLUDING ISOLATION, PROPER HAND HIGIENE, AVOID PUBLIC PLACES  WITH CROWDS, FOLLOW  CDC RECOMENDATIONS, AND KEEP PERSONAL AND SOCIAL RESPONSIBILITY.  PATIENT IS AWARE THIS INFECTION COULD HAVE SEVERE CONSEQUENCES TO PERSONAL HEALTH AND FAMILY RAMIFICATIONS OF THIS.

## 2020-04-20 ENCOUNTER — OFFICE VISIT (OUTPATIENT)
Dept: ORTHOPEDIC SURGERY | Facility: CLINIC | Age: 60
End: 2020-04-20

## 2020-04-20 VITALS — BODY MASS INDEX: 26.16 KG/M2 | WEIGHT: 157 LBS | HEIGHT: 65 IN

## 2020-04-20 DIAGNOSIS — Z96.641 STATUS POST TOTAL REPLACEMENT OF RIGHT HIP: Primary | ICD-10-CM

## 2020-04-20 PROCEDURE — 99024 POSTOP FOLLOW-UP VISIT: CPT | Performed by: ORTHOPAEDIC SURGERY

## 2020-04-20 NOTE — PROGRESS NOTES
"Patient Name: Marylu Georges   YOB: 1960  Referring Primary Care Physician: Provider, No Known  BMI: Body mass index is 26.13 kg/m².    Chief Complaint:    Chief Complaint   Patient presents with   • Right Hip - Post-op    You have chosen to receive care through a telephone visit. Do you consent to use a telephone visit for your medical care today? Yes    HPI:   Had a phone visit with Marylu checking up on her right total hip arthroplasty that was done on March 2.  She says she is doing well with the hip but having back issues.  They are both chronic problems.  She says she is trying to do some of the therapy but some of the hip therapy bothers her back.  She is try to get used to walking and being more active as she normally is.  She is really not requiring any additional medications and she feels she is improving.  She asked about a leg length discrepancy and said that the physical therapist to set it was \"half inch\".  I did measure her postop x-ray and they appear to be equal at the hips of told her perhaps she has some problem in her back etc. that give her an apparent leg length discrepancy but does not appear to be coming from the recent total hip arthroplasty  Marylu Georges is a 60 y.o. female who presents today for evaluation of   Chief Complaint   Patient presents with   • Right Hip - Post-op   .  Above    This problem is not new to this examiner.     Subjective   Medications:   Home Medications:  Current Outpatient Medications on File Prior to Visit   Medication Sig   • diclofenac (VOLTAREN) 1 % gel gel Apply pea size amount to area of pain up to four times a day   • letrozole (FEMARA) 2.5 MG tablet Take 1 tablet by mouth Daily.   • metoprolol succinate XL (Toprol XL) 25 MG 24 hr tablet Take 1 tablet by mouth Daily.   • [DISCONTINUED] acetaminophen (TYLENOL) 500 MG tablet Take 500 mg by mouth Every 6 (Six) Hours As Needed for Mild Pain .   • [DISCONTINUED] meclizine (ANTIVERT) 12.5 MG " tablet Take 12.5 mg by mouth 3 (Three) Times a Day As Needed for Dizziness.   • [DISCONTINUED] senna-docusate sodium (SENOKOT-S) 8.6-50 MG tablet Take 2 tablets by mouth 2 (Two) Times a Day. Until your first bowel movement and then daily as needed     No current facility-administered medications on file prior to visit.      Current Medications:  Scheduled Meds:  Continuous Infusions:  No current facility-administered medications for this visit.   PRN Meds:.    I have reviewed the patient's medical history in detail and updated the computerized patient record.  Review and summarization of old records includes:    Past Medical History:   Diagnosis Date   • Anemia in neoplastic disease    • Arthritis    • Breast cancer (CMS/HCC)     Left   • CVA (cerebral vascular accident) (CMS/HCC)    • Hip pain     RIGHT HIP... CYST   • History of fracture of leg 1987   • History of radiation therapy     LAST TREATMENT     • Hypertension    • Limited joint range of motion (ROM)     RIGHT HIP   • Skin sore     OPEN SORE LEFT BREAST   • Syncope    • Vertigo         Past Surgical History:   Procedure Laterality Date   • AXILLARY LYMPH NODE BIOPSY/EXCISION Right     LYMPH NODE UNDER RIGHT ARM-MALIGNANT (DOUBLE MASTECTOMY)   • BREAST BIOPSY Left     MALIGNANT   • FRACTURE SURGERY  1987    Leg   • HARDWARE REMOVAL     • MASTECTOMY W/ SENTINEL NODE BIOPSY Bilateral 9/16/2019    Procedure: BILATERLA MODIFIED RADICAL MASTECTOMY WITH BILATERAL SENTINEL LYMPH NODE BIOPSY;  Surgeon: Joby Barron Jr., MD;  Location: [facility];  Service: General   • TOTAL HIP ARTHROPLASTY Right 3/2/2020    Procedure: RIGHT TOTAL HIP ARTHROPLASTY NATALY NAVIGATION;  Surgeon: Luis M Leonard MD;  Location: [facility] OR;  Service: Orthopedics;  Laterality: Right;   • VENOUS ACCESS DEVICE (PORT) INSERTION Right 2/1/2019    Procedure: INSERTION VENOUS ACCESS DEVICE;  Surgeon: Joby Barron Jr., MD;  Location: [facility];  Service: General   •  VENOUS ACCESS DEVICE (PORT) REMOVAL N/A 10/30/2019    Procedure: Mediport Removal;  Surgeon: Joby Barron Jr., MD;  Location: [facility];  Service: General        Social History     Occupational History   • Occupation:      Employer: SELF-EMPLOYED   Tobacco Use   • Smoking status: Never Smoker   • Smokeless tobacco: Never Used   Substance and Sexual Activity   • Alcohol use: Not Currently     Alcohol/week: 7.0 standard drinks     Types: 4 Glasses of wine, 3 Cans of beer per week     Frequency: 2-3 times a week     Drinks per session: 1 or 2     Binge frequency: Never   • Drug use: No   • Sexual activity: Not Currently     Partners: Male     Birth control/protection: Post-menopausal      Social History     Social History Narrative   • Not on file        Family History   Problem Relation Age of Onset   • Lung cancer Father    • Cancer Mother    • Breast cancer Mother    • Liver disease Mother    • Breast cancer Sister 48   • Breast cancer Maternal Grandmother    • Breast cancer Paternal Grandmother    • Breast cancer Maternal Uncle    • Malig Hyperthermia Neg Hx        ROS: 14 point review of systems was performed and all other systems were reviewed and are negative except for documented findings in HPI and today's encounter.     Allergies:   Allergies   Allergen Reactions   • Benadryl [Diphenhydramine] Itching and Other (See Comments)     INCREASES BLOOD PRESSURE   • Erythromycin GI Intolerance   • Levaquin [Levofloxacin] GI Intolerance   • Penicillins GI Intolerance     Constitutional:  Denies fever, shaking or chills   Eyes:  Denies change in visual acuity   HENT:  Denies nasal congestion or sore throat   Respiratory:  Denies cough or shortness of breath   Cardiovascular:  Denies chest pain or severe LE edema   GI:  Denies abdominal pain, nausea, vomiting, bloody stools or diarrhea   Musculoskeletal:  Numbness, tingling, pain, or loss of motor function only as noted above in history of present  "illness.  : Denies painful urination or hematuria  Integument:  Denies rash, lesion or ulceration   Neurologic:  Denies headache or focal weakness  Endocrine:  Denies lymphadenopathy  Psych:  Denies confusion or change in mental status   Hem:  Denies active bleeding    OBJECTIVE:  Physical Exam: 60 y.o. female  Wt Readings from Last 3 Encounters:   04/20/20 71.2 kg (157 lb)   03/16/20 71.1 kg (156 lb 12.8 oz)   03/02/20 71.4 kg (157 lb 6 oz)     Ht Readings from Last 1 Encounters:   04/20/20 165.1 cm (65\")     Body mass index is 26.13 kg/m².  There were no vitals filed for this visit.  Vital signs reviewed.     General Appearance:    Alert, cooperative, in no acute distress                  Eyes: conjunctiva clear  ENT: external ears and nose atraumatic  CV: no peripheral edema  Resp: normal respiratory effort  Skin: no rashes or wounds; normal turgor  Psych: mood and affect appropriate  Lymph: no nodes appreciated  Neuro: gross sensation intact  Vascular:  Palpable peripheral pulse in noted extremity  Musculoskeletal Extremities: She says she is walking well she not using a walker and the hip pain is subsided from where it was preoperatively    Radiology:   Reviewing previous AP lateral right hip taken in the office with comparison view shows equivalent leg lengths and good position of her arthroplasty hardware.  Cannot get an x-ray today over the telephone obviously.    Assessment: s/p right total hip arthroplasty   Procedures       Plan: Stop total hip recommendations precautions given.  Need to see her back in a couple months with x-rays for routine follow-up.      4/20/2020    Much of this encounter note is an electronic transcription/translation of spoken language to printed text. The electronic translation of spoken language may permit erroneous, or at times, nonsensical words or phrases to be inadvertently transcribed; Although I have reviewed the note for such errors, some may still exist    "

## 2020-06-11 RX ORDER — LETROZOLE 2.5 MG/1
TABLET, FILM COATED ORAL
Qty: 90 TABLET | Refills: 2 | Status: SHIPPED | OUTPATIENT
Start: 2020-06-11 | End: 2021-03-23 | Stop reason: SDUPTHER

## 2020-06-25 ENCOUNTER — OFFICE VISIT (OUTPATIENT)
Dept: ORTHOPEDIC SURGERY | Facility: CLINIC | Age: 60
End: 2020-06-25

## 2020-06-25 VITALS — TEMPERATURE: 98.4 F | BODY MASS INDEX: 26.99 KG/M2 | WEIGHT: 162 LBS | HEIGHT: 65 IN

## 2020-06-25 DIAGNOSIS — M25.551 RIGHT HIP PAIN: Primary | ICD-10-CM

## 2020-06-25 DIAGNOSIS — Z96.641 STATUS POST TOTAL REPLACEMENT OF RIGHT HIP: ICD-10-CM

## 2020-06-25 PROCEDURE — 73502 X-RAY EXAM HIP UNI 2-3 VIEWS: CPT | Performed by: ORTHOPAEDIC SURGERY

## 2020-06-25 PROCEDURE — 99213 OFFICE O/P EST LOW 20 MIN: CPT | Performed by: ORTHOPAEDIC SURGERY

## 2020-06-25 RX ORDER — DICLOFENAC SODIUM 75 MG/1
75 TABLET, DELAYED RELEASE ORAL 2 TIMES DAILY
COMMUNITY
Start: 2020-05-01 | End: 2021-02-09 | Stop reason: SDUPTHER

## 2020-06-25 NOTE — PROGRESS NOTES
"Patient Name: Marylu Georges   YOB: 1960  Referring Primary Care Physician: Provider, No Known  BMI: Body mass index is 26.96 kg/m².    Chief Complaint:    Chief Complaint   Patient presents with   • Right Hip - Follow-up        HPI:     Marylu Georges is a 60 y.o. female who presents today for evaluation of   Chief Complaint   Patient presents with   • Right Hip - Follow-up   .  Marylu is about 4 months status post right total hip replacement is doing well.  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     Subjective   Medications:   Home Medications:  Current Outpatient Medications on File Prior to Visit   Medication Sig   • diclofenac (VOLTAREN) 1 % gel gel Apply pea size amount to area of pain up to four times a day   • diclofenac (VOLTAREN) 75 MG EC tablet Take 75 mg by mouth 2 (Two) Times a Day.   • letrozole (FEMARA) 2.5 MG tablet TAKE 1 TABLET BY MOUTH EVERY DAY   • metoprolol succinate XL (Toprol XL) 25 MG 24 hr tablet Take 1 tablet by mouth Daily.     No current facility-administered medications on file prior to visit.      Current Medications:  Scheduled Meds:  Continuous Infusions:  No current facility-administered medications for this visit.   PRN Meds:.    I have reviewed the patient's medical history in detail and updated the computerized patient record.  Review and summarization of old records includes:    Past Medical History:   Diagnosis Date   • Anemia in neoplastic disease    • Arthritis    • Breast cancer (CMS/HCC)     Left   • CVA (cerebral vascular accident) (CMS/HCC)    • Hip pain     RIGHT HIP... CYST   • History of fracture of leg 1987   • History of radiation therapy     LAST TREATMENT     • Hypertension    • Limited joint range of motion (ROM)     RIGHT HIP   • Skin sore     OPEN SORE LEFT BREAST   • Syncope    • " Vertigo         Past Surgical History:   Procedure Laterality Date   • AXILLARY LYMPH NODE BIOPSY/EXCISION Right     LYMPH NODE UNDER RIGHT ARM-MALIGNANT (DOUBLE MASTECTOMY)   • BREAST BIOPSY Left     MALIGNANT   • FRACTURE SURGERY  1987    Leg   • HARDWARE REMOVAL     • MASTECTOMY W/ SENTINEL NODE BIOPSY Bilateral 9/16/2019    Procedure: BILATERLA MODIFIED RADICAL MASTECTOMY WITH BILATERAL SENTINEL LYMPH NODE BIOPSY;  Surgeon: Joby Barron Jr., MD;  Location: [facility] OR;  Service: General   • TOTAL HIP ARTHROPLASTY Right 3/2/2020    Procedure: RIGHT TOTAL HIP ARTHROPLASTY NATALY NAVIGATION;  Surgeon: Luis M Leonard MD;  Location: [facility] OR;  Service: Orthopedics;  Laterality: Right;   • VENOUS ACCESS DEVICE (PORT) INSERTION Right 2/1/2019    Procedure: INSERTION VENOUS ACCESS DEVICE;  Surgeon: Joby Barron Jr., MD;  Location: [facility];  Service: General   • VENOUS ACCESS DEVICE (PORT) REMOVAL N/A 10/30/2019    Procedure: Mediport Removal;  Surgeon: Joby Barron Jr., MD;  Location: [facility] OR;  Service: General        Social History     Occupational History   • Occupation:      Employer: SELF-EMPLOYED   Tobacco Use   • Smoking status: Never Smoker   • Smokeless tobacco: Never Used   Substance and Sexual Activity   • Alcohol use: Not Currently     Alcohol/week: 7.0 standard drinks     Types: 4 Glasses of wine, 3 Cans of beer per week     Frequency: 2-3 times a week     Drinks per session: 1 or 2     Binge frequency: Never   • Drug use: No   • Sexual activity: Not Currently     Partners: Male     Birth control/protection: Post-menopausal    Social History     Social History Narrative   • Not on file        Family History   Problem Relation Age of Onset   • Lung cancer Father    • Cancer Mother    • Breast cancer Mother    • Liver disease Mother    • Breast cancer Sister 48   • Breast cancer Maternal Grandmother    • Breast cancer Paternal Grandmother    • Breast cancer  "Maternal Uncle    • Malig Hyperthermia Neg Hx        ROS: 14 point review of systems was performed and all other systems were reviewed and are negative except for documented findings in HPI and today's encounter.     Allergies:   Allergies   Allergen Reactions   • Benadryl [Diphenhydramine] Itching and Other (See Comments)     INCREASES BLOOD PRESSURE   • Erythromycin GI Intolerance   • Levaquin [Levofloxacin] GI Intolerance   • Penicillins GI Intolerance     Constitutional:  Denies fever, shaking or chills   Eyes:  Denies change in visual acuity   HENT:  Denies nasal congestion or sore throat   Respiratory:  Denies cough or shortness of breath   Cardiovascular:  Denies chest pain or severe LE edema   GI:  Denies abdominal pain, nausea, vomiting, bloody stools or diarrhea   Musculoskeletal:  Numbness, tingling, pain, or loss of motor function only as noted above in history of present illness.  : Denies painful urination or hematuria  Integument:  Denies rash, lesion or ulceration   Neurologic:  Denies headache or focal weakness  Endocrine:  Denies lymphadenopathy  Psych:  Denies confusion or change in mental status   Hem:  Denies active bleeding    OBJECTIVE:  Physical Exam: 60 y.o. female  Wt Readings from Last 3 Encounters:   06/25/20 73.5 kg (162 lb)   04/20/20 71.2 kg (157 lb)   03/16/20 71.1 kg (156 lb 12.8 oz)     Ht Readings from Last 1 Encounters:   06/25/20 165.1 cm (65\")     Body mass index is 26.96 kg/m².  Vitals:    06/25/20 1024   Temp: 98.4 °F (36.9 °C)     Vital signs reviewed.     General Appearance:    Alert, cooperative, in no acute distress                  Eyes: conjunctiva clear  ENT: external ears and nose atraumatic  CV: no peripheral edema  Resp: normal respiratory effort  Skin: no rashes or wounds; normal turgor  Psych: mood and affect appropriate  Lymph: no nodes appreciated  Neuro: gross sensation intact  Vascular:  Palpable peripheral pulse in noted extremity  Musculoskeletal " Extremities: She walks without a limp not tender over the hip and has good motion    Radiology:   AP of the hips lateral right hip taken the office today show good position of her total hip arthroplasty with comparison views    Assessment:     ICD-10-CM ICD-9-CM   1. Right hip pain M25.551 719.45   2. Status post total replacement of right hip Z96.641 V43.64        Procedures       Plan: Postop total hip recommendations given including antibiotic prophylaxis and answer questions.  See her back in about 6 months with x-rays of her hip      6/25/2020    Much of this encounter note is an electronic transcription/translation of spoken language to printed text. The electronic translation of spoken language may permit erroneous, or at times, nonsensical words or phrases to be inadvertently transcribed; Although I have reviewed the note for such errors, some may still exist

## 2020-06-29 ENCOUNTER — OFFICE VISIT (OUTPATIENT)
Dept: ONCOLOGY | Facility: CLINIC | Age: 60
End: 2020-06-29

## 2020-06-29 ENCOUNTER — LAB (OUTPATIENT)
Dept: LAB | Facility: HOSPITAL | Age: 60
End: 2020-06-29

## 2020-06-29 VITALS
SYSTOLIC BLOOD PRESSURE: 141 MMHG | RESPIRATION RATE: 20 BRPM | DIASTOLIC BLOOD PRESSURE: 87 MMHG | HEART RATE: 62 BPM | BODY MASS INDEX: 27.04 KG/M2 | OXYGEN SATURATION: 96 % | HEIGHT: 65 IN | TEMPERATURE: 98.4 F | WEIGHT: 162.3 LBS

## 2020-06-29 DIAGNOSIS — Z80.3 FAMILY HISTORY OF BREAST CANCER IN FEMALE: ICD-10-CM

## 2020-06-29 DIAGNOSIS — M16.11 ARTHRITIS OF RIGHT HIP: ICD-10-CM

## 2020-06-29 DIAGNOSIS — Z17.0 MALIGNANT NEOPLASM OF OVERLAPPING SITES OF LEFT BREAST IN FEMALE, ESTROGEN RECEPTOR POSITIVE (HCC): ICD-10-CM

## 2020-06-29 DIAGNOSIS — C50.812 MALIGNANT NEOPLASM OF OVERLAPPING SITES OF LEFT BREAST IN FEMALE, ESTROGEN RECEPTOR POSITIVE (HCC): Primary | ICD-10-CM

## 2020-06-29 DIAGNOSIS — Z17.0 MALIGNANT NEOPLASM OF OVERLAPPING SITES OF BOTH BREASTS IN FEMALE, ESTROGEN RECEPTOR POSITIVE (HCC): ICD-10-CM

## 2020-06-29 DIAGNOSIS — C50.812 MALIGNANT NEOPLASM OF OVERLAPPING SITES OF LEFT BREAST IN FEMALE, ESTROGEN RECEPTOR POSITIVE (HCC): ICD-10-CM

## 2020-06-29 DIAGNOSIS — L58.0 ACUTE RADIATION DERMATITIS: ICD-10-CM

## 2020-06-29 DIAGNOSIS — C50.812 MALIGNANT NEOPLASM OF OVERLAPPING SITES OF BOTH BREASTS IN FEMALE, ESTROGEN RECEPTOR POSITIVE (HCC): ICD-10-CM

## 2020-06-29 DIAGNOSIS — Z17.0 MALIGNANT NEOPLASM OF OVERLAPPING SITES OF LEFT BREAST IN FEMALE, ESTROGEN RECEPTOR POSITIVE (HCC): Primary | ICD-10-CM

## 2020-06-29 DIAGNOSIS — I10 ESSENTIAL HYPERTENSION: ICD-10-CM

## 2020-06-29 DIAGNOSIS — I89.0 LYMPHEDEMA OF RIGHT ARM: ICD-10-CM

## 2020-06-29 DIAGNOSIS — C50.811 MALIGNANT NEOPLASM OF OVERLAPPING SITES OF BOTH BREASTS IN FEMALE, ESTROGEN RECEPTOR POSITIVE (HCC): ICD-10-CM

## 2020-06-29 LAB
ALBUMIN SERPL-MCNC: 4.4 G/DL (ref 3.5–5.2)
ALBUMIN/GLOB SERPL: 2 G/DL (ref 1.1–2.4)
ALP SERPL-CCNC: 95 U/L (ref 38–116)
ALT SERPL W P-5'-P-CCNC: 18 U/L (ref 0–33)
ANION GAP SERPL CALCULATED.3IONS-SCNC: 11.9 MMOL/L (ref 5–15)
AST SERPL-CCNC: 22 U/L (ref 0–32)
BASOPHILS # BLD AUTO: 0.04 10*3/MM3 (ref 0–0.2)
BASOPHILS NFR BLD AUTO: 1 % (ref 0–1.5)
BILIRUB SERPL-MCNC: 0.3 MG/DL (ref 0.2–1.2)
BUN SERPL-MCNC: 25 MG/DL (ref 6–20)
BUN/CREAT SERPL: 30.1 (ref 7.3–30)
CALCIUM SPEC-SCNC: 9.6 MG/DL (ref 8.5–10.2)
CANCER AG15-3 SERPL-ACNC: 11.1 U/ML
CHLORIDE SERPL-SCNC: 105 MMOL/L (ref 98–107)
CO2 SERPL-SCNC: 24.1 MMOL/L (ref 22–29)
CREAT SERPL-MCNC: 0.83 MG/DL (ref 0.6–1.1)
DEPRECATED RDW RBC AUTO: 51 FL (ref 37–54)
EOSINOPHIL # BLD AUTO: 0.1 10*3/MM3 (ref 0–0.4)
EOSINOPHIL NFR BLD AUTO: 2.5 % (ref 0.3–6.2)
ERYTHROCYTE [DISTWIDTH] IN BLOOD BY AUTOMATED COUNT: 14.7 % (ref 12.3–15.4)
GFR SERPL CREATININE-BSD FRML MDRD: 70 ML/MIN/1.73
GLOBULIN UR ELPH-MCNC: 2.2 GM/DL (ref 1.8–3.5)
GLUCOSE SERPL-MCNC: 116 MG/DL (ref 74–124)
HCT VFR BLD AUTO: 38.3 % (ref 34–46.6)
HGB BLD-MCNC: 12.1 G/DL (ref 12–15.9)
IMM GRANULOCYTES # BLD AUTO: 0.01 10*3/MM3 (ref 0–0.05)
IMM GRANULOCYTES NFR BLD AUTO: 0.2 % (ref 0–0.5)
LYMPHOCYTES # BLD AUTO: 0.74 10*3/MM3 (ref 0.7–3.1)
LYMPHOCYTES NFR BLD AUTO: 18.3 % (ref 19.6–45.3)
MCH RBC QN AUTO: 29.8 PG (ref 26.6–33)
MCHC RBC AUTO-ENTMCNC: 31.6 G/DL (ref 31.5–35.7)
MCV RBC AUTO: 94.3 FL (ref 79–97)
MONOCYTES # BLD AUTO: 0.28 10*3/MM3 (ref 0.1–0.9)
MONOCYTES NFR BLD AUTO: 6.9 % (ref 5–12)
NEUTROPHILS # BLD AUTO: 2.88 10*3/MM3 (ref 1.7–7)
NEUTROPHILS NFR BLD AUTO: 71.1 % (ref 42.7–76)
NRBC BLD AUTO-RTO: 0 /100 WBC (ref 0–0.2)
PLATELET # BLD AUTO: 184 10*3/MM3 (ref 140–450)
PMV BLD AUTO: 8.3 FL (ref 6–12)
POTASSIUM SERPL-SCNC: 3.9 MMOL/L (ref 3.5–4.7)
PROT SERPL-MCNC: 6.6 G/DL (ref 6.3–8)
RBC # BLD AUTO: 4.06 10*6/MM3 (ref 3.77–5.28)
SODIUM SERPL-SCNC: 141 MMOL/L (ref 134–145)
WBC NRBC COR # BLD: 4.05 10*3/MM3 (ref 3.4–10.8)

## 2020-06-29 PROCEDURE — 36415 COLL VENOUS BLD VENIPUNCTURE: CPT

## 2020-06-29 PROCEDURE — 80053 COMPREHEN METABOLIC PANEL: CPT

## 2020-06-29 PROCEDURE — 99215 OFFICE O/P EST HI 40 MIN: CPT | Performed by: INTERNAL MEDICINE

## 2020-06-29 PROCEDURE — 85025 COMPLETE CBC W/AUTO DIFF WBC: CPT

## 2020-06-29 PROCEDURE — 86300 IMMUNOASSAY TUMOR CA 15-3: CPT | Performed by: INTERNAL MEDICINE

## 2020-06-29 RX ORDER — CEPHALEXIN 500 MG/1
500 CAPSULE ORAL 4 TIMES DAILY
Qty: 32 CAPSULE | Refills: 4 | Status: SHIPPED | OUTPATIENT
Start: 2020-06-29 | End: 2021-01-21

## 2020-06-29 NOTE — PROGRESS NOTES
Subjective     REASON FOR FOLLOW UP:  1. 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 R BREAST MASS WITH GIANT R AXILLARY ADENOPATHY.  3. FAMILY HISTORY OF BREAST CANCER IN MOTHER AND SISTER, SISTER WAS TESTED FOR BRCA 1 and 2 AND Were NEGATIVE.File Link     Scan on 4/23/2019 1411 by Ely Dockery: GENETIC TEST RESULTS WITH F/U NOTE [id number] on 4/23/2019 1411 by Ely Dockery: GENETIC TEST RESULTS WITH F/U NOTE 4-2-2019   Key Information     Document ID File Type Document Type Description   [id number] Image GENETICS - SCAN GENETIC TEST RESULTS WITH F/U NOTE 4-2-2019   Import Information     Attached At Date Time User Dept   Encounter Level 4/23/2019  2:11 PM Ely Dockery [facility]   Encounter     Clinical Support on 4/2/19 with GENETIC CLINIC                                            History of Present Illness    PATIENT WAS CALLED THE DAY BEFORE BY THE OFFICE TO ASK FOR SYMPTOMS THAT COULD BE CONSISTENT WITH CORONAVIRUS INFECTION, AND BEING NEGATIVE WAS SCHEDULED TO BE SEEN IN THE OFFICE TODAY. SIMILAR QUESTIONING TODAY INCLUDING, CHILLS, FEVER, NEW COUGH, SHORTNESS OF BREATH, DIARRHEA,DIFFUSE BODY ACHES  AND CHANGES IN SMELL OR TASTE WERE NEGATIVE.DURING THE VISIT WITH THE PATIENT TODAY , PATIENT HAD FACE MASK, I HAD PROPPER PROTECTIVE EQUIPMENT, AND I DID HAND HYGIENE WITH SOAP AND WATER BEFORE AND AFTER THE VISIT.    This patient returns today to the office very upset because how beautiful her hair has grown back. She completed her plan of chemotherapy last year in the background of bulky metastatic breast cancer in the left and metastasis into the right axilla. In any event the patient was treated with neoadjuvant chemotherapy, subsequently extensive surgery, bilateral mastectomies, subsequently bilateral radiation therapy. The patient since the previous visit completed her right hip replacement and she has had a dramatic improvement in her ability to function  with near complete resolution of her pain. Now she is experiencing different pains one in the left knee and another in her back. Her back is related to spasm in the muscles and she is doing workout for this along with therapy. She has noticed some edema in her right upper extremity in absence of trauma that has been present for at least 2 weeks. She has not had any pain. Her chest walls remain unremarkable. She has not had any cough or sputum production. Her bowel activity remains normal. Urination is normal. There is no obvious bone pain and the pain that she is experiencing is in the left knee and also in her paraspinal muscles.                                             Past Medical History:   Diagnosis Date   • Anemia in neoplastic disease    • Arthritis    • Breast cancer (CMS/HCC)     Left   • CVA (cerebral vascular accident) (CMS/HCC)    • Hip pain     RIGHT HIP... CYST   • History of fracture of leg 1987   • History of radiation therapy     LAST TREATMENT     • Hypertension    • Limited joint range of motion (ROM)     RIGHT HIP   • Skin sore     OPEN SORE LEFT BREAST   • Syncope    • Vertigo            Past Surgical History:   Procedure Laterality Date   • AXILLARY LYMPH NODE BIOPSY/EXCISION Right     LYMPH NODE UNDER RIGHT ARM-MALIGNANT (DOUBLE MASTECTOMY)   • BREAST BIOPSY Left     MALIGNANT   • FRACTURE SURGERY  1987    Leg   • HARDWARE REMOVAL     • MASTECTOMY W/ SENTINEL NODE BIOPSY Bilateral 9/16/2019    Procedure: BILATERLA MODIFIED RADICAL MASTECTOMY WITH BILATERAL SENTINEL LYMPH NODE BIOPSY;  Surgeon: Joby Barron Jr., MD;  Location: [facility];  Service: General   • TOTAL HIP ARTHROPLASTY Right 3/2/2020    Procedure: RIGHT TOTAL HIP ARTHROPLASTY NATALY NAVIGATION;  Surgeon: Luis M Leonard MD;  Location: [facility];  Service: Orthopedics;  Laterality: Right;   • VENOUS ACCESS DEVICE (PORT) INSERTION Right 2/1/2019    Procedure: INSERTION VENOUS ACCESS DEVICE;  Surgeon: Anderson  Joby Brito MD;  [facility];  Service: General   • VENOUS ACCESS DEVICE (PORT) REMOVAL N/A 10/30/2019    Procedure: Mediport Removal;  Surgeon: Joby Barron Jr., MD;  Location: [facility] OR;  Service: General        Current Outpatient Medications on File Prior to Visit   Medication Sig Dispense Refill   • diclofenac (VOLTAREN) 1 % gel gel Apply pea size amount to area of pain up to four times a day 4 tube 3   • diclofenac (VOLTAREN) 75 MG EC tablet Take 75 mg by mouth 2 (Two) Times a Day.     • letrozole (FEMARA) 2.5 MG tablet TAKE 1 TABLET BY MOUTH EVERY DAY 90 tablet 2   • metoprolol succinate XL (Toprol XL) 25 MG 24 hr tablet Take 1 tablet by mouth Daily. 30 tablet 6     No current facility-administered medications on file prior to visit.         ALLERGIES:    Allergies   Allergen Reactions   • Benadryl [Diphenhydramine] Itching and Other (See Comments)     INCREASES BLOOD PRESSURE   • Erythromycin GI Intolerance   • Levaquin [Levofloxacin] GI Intolerance   • Penicillins GI Intolerance        Social History     Socioeconomic History   • Marital status:      Spouse name: Cruz   • Number of children: 0   • Years of education: Not on file   • Highest education level: Not on file   Occupational History   • Occupation:      Employer: SELF-EMPLOYED   Social Needs   • Financial resource strain: Not hard at all   • Food insecurity:     Worry: Never true     Inability: Never true   • Transportation needs:     Medical: No     Non-medical: No   Tobacco Use   • Smoking status: Never Smoker   • Smokeless tobacco: Never Used   Substance and Sexual Activity   • Alcohol use: Not Currently     Alcohol/week: 7.0 standard drinks     Types: 4 Glasses of wine, 3 Cans of beer per week     Frequency: 2-3 times a week     Drinks per session: 1 or 2     Binge frequency: Never   • Drug use: No   • Sexual activity: Not Currently     Partners: Male     Birth control/protection: Post-menopausal    Lifestyle   • Physical activity:     Days per week: 7 days     Minutes per session: 120 min   • Stress: Only a little        Family History   Problem Relation Age of Onset   • Lung cancer Father    • Cancer Mother    • Breast cancer Mother    • Liver disease Mother    • Breast cancer Sister 48   • Breast cancer Maternal Grandmother    • Breast cancer Paternal Grandmother    • Breast cancer Maternal Uncle    • Malig Hyperthermia Neg Hx         Review of Systems           General: no fever, no chills, no fatigue,no weight changes, no lack of appetite.  Eyes: no epiphora, xerophthalmia,conjunctivitis, pain, glaucoma, blurred vision, blindness, secretion, photophobia, proptosis, diplopia.  Ears: no otorrhea, tinnitus, otorrhagia, deafness, pain, vertigo.  Nose: no rhinorrhea, no epistaxis, no alteration in perception of odors, no sinuses pressure.  Mouth: no alteration in gums or teeth,  No ulcers, no difficulty with mastication or deglut ion, no odynophagia.  Neck: no masses or pain, no thyroid alterations, no pain in muscles or arteries, no carotid odynia, no crepitation.  Respiratory: no cough, no sputum production,no dyspnea,no trepopnea, no pleuritic pain,no hemoptysis.  Heart: no syncope, no irregularity, no palpitations, no angina,no orthopnea,no paroxysmal nocturnal dyspnea.  Vascular Venous: no tenderness,no edema,no palpable cords,no postphlebitic syndrome, no skin changes no ulcerations.  Vascular Arterial: no distal ischemia, noclaudication, no gangrene, no neuropathic ischemic pain, no skin ulcers, no paleness no cyanosis.  GI: no dysphagia, no odynophagia, no regurgitation, no heartburn,no indigestion,no nausea,no vomiting,no hematemesis ,no melena,no jaundice,no distention, no obstipation,no enterorrhagia,no proctalgia,no anal  lesions, no changes in bowel habits.  : no frequency, no hesitancy, no hematuria, no discharge,no  pain.  Musculoskeletal: STATED muscle or tendon pain or inflammation,no   "joint pain, no edema, no functional limitation,no fasciculations, no mass.LYMPHEDEMA RUE  Neurologic: no headache, no seizures, noalterations on Craneal nerves, no motor deficit, no sensory deficit, normal coordination, no alteration in memory,normal orientation, calculation,normal writting, verbal and written language.  Skin: no rashes,no pruritus no localized lesions.  Psychiatric: no anxiety, no depression,no agitation, no delusions, proper insight.                      Objective     Vitals:    06/29/20 1510   BP: 141/87   Pulse: 62   Resp: 20   Temp: 98.4 °F (36.9 °C)   TempSrc: Skin   SpO2: 96%   Weight: 73.6 kg (162 lb 4.8 oz)   Height: 165.1 cm (65\")   PainSc:   5   PainLoc: Hip  Comment: Hip replacement, knee     Current Status 6/29/2020   ECOG score 0       Physical Exam Patient screened negataive for depression based on a PHQ-9 score of  0 today .          GENERAL ASPECT: IN GOOD HEALTH WALKING THROUGH TE OFFICE NO DIFFICULTIES, NO PAIN.PROPER NUTRITION.WELL KEPT PHYSICALLY.  EYES : NOT JAUNDICED, PUPILS WERE EQUAL.  HEART: REGULAR NO MURMURS , NO GALLOPS, NO RUBS.  ABDOMEN: NOT DISTENDED, NO PAIN, NO LIVER OR SPLEEN ENLARGEMENT, NO ASCITES, NO COLLATERAL CIRCULATION.  EXTREMITIES: NO EDEMA, NO PAIN, NO CLUBBING, NO DEFORMITIES.LYMPHEDEMA GRADE 1 RUE  NEUROLOGIC: NORMAL CONVERSATION, MEMORY , SPEECH AND GAIT.  SKIN: NO LESIONS.   Bilateral mastectomy sites in the chest wall well healed. There is no induration, redness, erythema, infiltration. Axillas were unremarkable with no lesions or alterations otherwise. Careful methodic examination of shoulder function shows dramatic improvement in range of motion.                                     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 At least for the last 4 years, she has had an in crescendo mass in the left breast that has produced a gigantic tumor that is now close to 25 cm in size and is replacing most of the anatomy of the left breast. There are areas of ulceration necrosis and bleeding and the mass is fixed to the chest wall. She has abundant amount of collateral circulation in the left anterior chest wall and it caught my attention the lack of any left axillary adenopathy. She has no lymphedema in the left upper extremity. In the right breast while in sitting position, no palpable abnormalities are documented. The right breast skin and nipple are normal. When the patient lies flat, there is a palpable mass at 9 o'clock from the nipple that measures probably 4 cm in size, very indistinct in regard to edges and margins. There was no mobility and no areas of tenderness. She has a giant adenopathy in the right axilla that measures close to 9 cm in size, uniform, no fluctuation, nontender, completely fixed to the axillary wall. There is no compromise of the skin.         Since the previous visit the patient has completed all of her plan of chemotherapy neoadjuvantly. She has had a very dramatic response not only to the primary tumor in the left breast, her left breast remind ourselves was 3 times the normal size and had an ulcerated mass that was huge with bleeding and necrosis. This has resolved. She had no left axillary adenopathy but she had an induration in the right breast and most importantly she had almost a 7 cm mass in the right axilla. Since completion of  chemotherapy all of these issues have resolved near completely. She has had residual mass behind the scar in the left breast that measures close to 4 cm, no left axillary adenopathy and near complete resolution of the right axillary adenopathy. No nodularity in the right breast.     On 10/15/2019 the patient completed several weeks ago her bilateral mastectomy and axillary lymph node dissection. She had no residual malignancy on any level in the chest wall. There is a tumor that had very dramatic regression in the left breast from almost 25 cm in diameter to 3.5, still positive left axillary lymph nodes. The size of them were smaller than originally thought. The right breast disclosed no primary tumor. She had a tumor located on the CT scan there before and clinically documented. The right axilla disclosed still positive lymph nodes, not surprising she had a large tumoral lesion right there. Now she has nothing left.     After the previous visit the patient now has had almost 3 months since the completion of her right hip surgery and this has been a blessing for her in regard to her ability to walk and ride horses at [employer]. On the other hand a normal hip now has woken up the left knee and she has required topical therapy with Voltaren besides antiinflammatory medication from time to time. She has done physical therapy for her back pain that mostly is musculoskeletal and not bone per se. This has produced substantial improvement in the symptoms pertinent to this. She is very upset in how beautiful her hair has grown back after her chemotherapy last year.     The hemoglobin has improved from 9.1 at the time that she left for her hip surgery to 12 today. Her white count and her platelet count are normal. Her chemistry profile and tumor markers are pending.     She has developed lymphedema grade 1 in the right upper extremity and this will require proper assessment.     RECOMMENDATIONS:  1. Her letrozole  adjuvant therapy will remain ongoing for the time being at a dose of 2.5 mg a day. She has 100% compliance and no side effects of the medicine.  2. The patient remains on her blood pressure medication prescribed by me. Her blood pressure is appropriate today.   3. The patient remains on her vitamin D supplement.  4. The patient also has been advised in regard her grade 1 lymphedema in the right upper extremity. This is not surprising. I advised her to use gloves all the time when she is handling the horses and avoid infections and take care of needs and lesions in the skin, nailbeds and so forth in the right upper extremity. Actually I sent a prescription for Keflex 500 mg tablets #28 to take 1 tablets oral 4 times a day in case that she has any infection that could trigger cellulitis. She will have refills on this medicine for this purpose only. I asked her to take this medicine wherever she goes.    I will refer her to the lymphedema clinic, she will require appropriate massage and sleeve.   5. I will review her back in 4 months with a CBC, CMP and a CA 15-3.     I discussed all of these facts with the patient and her brother who came from [place] to be here with us.    I find no need for radiological assessment on her at this time.                 I DISCUSSED WITH PATIENT IN DETAIL FORMS TO DECREASE CHANCES OF CORONAVIRUS INFECTION INCLUDING ISOLATION, PROPER HAND HIGIENE, AVOID PUBLIC PLACES  WITH CROWDS, FOLLOW  CDC RECOMENDATIONS, AND KEEP PERSONAL AND SOCIAL RESPONSIBILITY.  PATIENT IS AWARE THIS INFECTION COULD HAVE SEVERE CONSEQUENCES TO PERSONAL HEALTH AND FAMILY RAMIFICATIONS OF THIS.

## 2020-06-30 ENCOUNTER — TELEPHONE (OUTPATIENT)
Dept: ONCOLOGY | Facility: CLINIC | Age: 60
End: 2020-06-30

## 2020-06-30 ENCOUNTER — TELEPHONE (OUTPATIENT)
Dept: ORTHOPEDIC SURGERY | Facility: CLINIC | Age: 60
End: 2020-06-30

## 2020-06-30 NOTE — TELEPHONE ENCOUNTER
CALLED PT AT HOME AND HAD TO LEAVE A V/M FOR PTS UPCOMING APPT WITH DR KYLE AND I LEFT MY NUMBER IF QUESTIONS/DM

## 2020-06-30 NOTE — TELEPHONE ENCOUNTER
----- Message from Elie Dixon MD sent at 6/30/2020  7:50 AM EDT -----  Call her cancer marker is great 11 nothing to change

## 2020-08-13 ENCOUNTER — HOSPITAL ENCOUNTER (OUTPATIENT)
Dept: OCCUPATIONAL THERAPY | Facility: HOSPITAL | Age: 60
Setting detail: THERAPIES SERIES
Discharge: HOME | End: 2020-08-13

## 2020-08-13 DIAGNOSIS — C50.812 MALIGNANT NEOPLASM OF OVERLAPPING SITES OF LEFT BREAST IN FEMALE, ESTROGEN RECEPTOR POSITIVE (HCC): ICD-10-CM

## 2020-08-13 DIAGNOSIS — Z17.0 MALIGNANT NEOPLASM OF OVERLAPPING SITES OF LEFT BREAST IN FEMALE, ESTROGEN RECEPTOR POSITIVE (HCC): ICD-10-CM

## 2020-08-13 DIAGNOSIS — I97.2 POST-MASTECTOMY LYMPHEDEMA SYNDROME: Primary | ICD-10-CM

## 2020-08-13 PROCEDURE — 97166 OT EVAL MOD COMPLEX 45 MIN: CPT

## 2020-08-13 PROCEDURE — 97535 SELF CARE MNGMENT TRAINING: CPT

## 2020-08-13 NOTE — THERAPY EVALUATION
Outpatient Occupational Therapy Lymphedema Initial Evaluation  [facility]     Patient Name: Marylu Georges  : 1960  MRN: [id number]  Today's Date: 2020      Visit Date: 2020    Patient Active Problem List   Diagnosis   • Malignant neoplasm of overlapping sites of left breast in female, estrogen receptor positive (CMS/HCC)   • Axillary mass, right   • Family history of breast cancer in female   • Syncope   • Malignant neoplasm of overlapping sites of both breasts in female, estrogen receptor positive (CMS/HCC)   • Hypertension   • Arthritis of right hip        Past Medical History:   Diagnosis Date   • Anemia in neoplastic disease    • Arthritis    • Breast cancer (CMS/HCC)     Left   • CVA (cerebral vascular accident) (CMS/HCC)    • Hip pain     RIGHT HIP... CYST   • History of fracture of leg    • History of radiation therapy     LAST TREATMENT     • Hypertension    • Limited joint range of motion (ROM)     RIGHT HIP   • Skin sore     OPEN SORE LEFT BREAST   • Syncope    • Vertigo         Past Surgical History:   Procedure Laterality Date   • AXILLARY LYMPH NODE BIOPSY/EXCISION Right     LYMPH NODE UNDER RIGHT ARM-MALIGNANT (DOUBLE MASTECTOMY)   • BREAST BIOPSY Left     MALIGNANT   • FRACTURE SURGERY      Leg   • HARDWARE REMOVAL     • MASTECTOMY W/ SENTINEL NODE BIOPSY Bilateral 2019    Procedure: BILATERLA MODIFIED RADICAL MASTECTOMY WITH BILATERAL SENTINEL LYMPH NODE BIOPSY;  Surgeon: Joby Barron Jr., MD;  Location: [facility];  Service: General   • TOTAL HIP ARTHROPLASTY Right 3/2/2020    Procedure: RIGHT TOTAL HIP ARTHROPLASTY NATALY NAVIGATION;  Surgeon: Luis M Leonard MD;  Location: [facility];  Service: Orthopedics;  Laterality: Right;   • VENOUS ACCESS DEVICE (PORT) INSERTION Right 2019    Procedure: INSERTION VENOUS ACCESS DEVICE;  Surgeon: Joby Barron Jr., MD;  Location: [facility];  Service: General   • VENOUS ACCESS DEVICE (PORT)  REMOVAL N/A 10/30/2019    Procedure: Mediport Removal;  Surgeon: Joby Barron Jr., MD;  Location: [facility] OR;  Service: General         Visit Dx:     ICD-10-CM ICD-9-CM   1. 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 Malignant neoplasm of overlapping sites of left breast in female, estrogen receptor positive (CMS/[facility]) C50.812 174.8    Z17.0 V86.0       Patient History     Row Name 08/13/20 1100             History    Chief Complaint  Swelling  -RE      Date Current Problem(s) Began  05/16/20  -RE      Brief Description of Current Complaint  When patient returned to work she noted increased swelling inthe right arm but it has decreased slightly.  The more she works the more it swells but is not extreme.  -RE      Patient/Caregiver Goals  Know what to do to help the symptoms;Decrease swelling  -RE      Are you or can you be pregnant  No  -RE         Pain     Pain at Present  0  -RE         Fall Risk Assessment    Any falls in the past year:  Yes  -RE      Number of falls reported in the last 12 months  1  -RE         Services    Are you currently receiving Home Health services  No  -RE         Daily Activities    Primary Language  English  -RE      Are you able to read  Yes  -RE      Are you able to write  Yes  -RE      How does patient learn best?  Listening;Reading;Demonstration  -RE      Teaching needs identified  Home Exercise Program;Management of Condition  -RE      Does patient have problems with the following?  None  -RE      Barriers to learning  None  -RE      Pt Participated in POC and Goals  Yes  -RE         Safety    Are you being hurt, hit, or frightened by anyone at home or in your life?  No  -RE      Are you being neglected by a caregiver  No  -RE        User Key  (r) = Recorded By, (t) = Taken By, (c) = Cosigned By    Initials Name Provider Type    RE Shavonne Gonzalez OTR Occupational Therapist          Lymphedema     Row Name 08/13/20 1600             Subjective Pain    Able to rate  subjective pain?  no  -RE      Pre-Treatment Pain Level  0  -RE      Post-Treatment Pain Level  0  -RE         Lymphedema Assessment    Lymphedema Classification  RUE:;LUE:;secondary;stage 1 (Spontaneously Reversible)  -RE      Lymphedema Cancer Related Sx  bilateral;radical mastectomy  -RE      Cancer Comments  positive nodes mignon axillae  -RE      Chemo Received  yes  -RE      Chemo Treatments #/Timeframe  -- mo  -RE      Radiation Therapy Received  yes  -RE      Radiation Treatments #/Timeframe  -- mignon axillae and chest walls  -RE      Infections or Cellulitis?  no  -RE         Posture/Observations    Posture- WNL  Posture is WNL  -RE         General ROM    GENERAL ROM COMMENTS  UE AROM is WNL  -RE         MMT (Manual Muscle Testing)    General MMT Comments  mignon UE strength is grossly 5/5  -RE         Lymphedema Edema Assessment    Ptting Edema Category  By grade out of 4  -RE      Pitting Edema  + 2/4;+ 3/4;Mild;Moderate  -RE      Edema Assessment Comment  mignon forearms R>L  -RE         Skin Changes/Observations    Skin Observations Comment  skin is intact and normal in color  -RE         Lymphedema Measurements    Measurement Type(s)  Circumferential  -RE      Circumferential Areas  Upper extremities  -RE         BUE Circumferential (cm)    Measurement Location 1  axilla  -RE      Left 1  29 cm  -RE      Right 1  29.5 cm  -RE      Measurement Location 2  15 cm above elbow  -RE      Left 2  28 cm  -RE      Right 2  27.5 cm  -RE      Measurement Location 3  10 cm above elbow  -RE      Left 3  27 cm  -RE      Right 3  26 cm  -RE      Measurement Location 4  5 cm above elbow  -RE      Left 4  27 cm  -RE      Right 4  25 cm  -RE      Measurement Location 5  Elbow  -RE      Left 5  26 cm  -RE      Right 5  24.9 cm  -RE      Measurement Location 6  5 cm below elbow  -RE      Left 6  24.5 cm  -RE      Right 6  25.3 cm  -RE      Measurement Location 7  10 cm below elbow  -RE      Left 7  22 cm  -RE      Right 7  23 cm   "-RE      Measurement Location 8  15 cm below elbow  -RE      Left 8  19 cm  -RE      Right 8  19.8 cm  -RE      Measurement Location 9  20 cm below elbow  -RE      Left 9  15 cm  -RE      Right 9  17 cm  -RE      Measurement Location 10  Wrist  -RE      Left 10  15.7 cm  -RE      Right 10  16 cm  -RE      Measurement Location 11  Mid palm  -RE      Left 11  19 cm  -RE      Right 11  20 cm  -RE      LUE Circumferential Total  252.2 cm  -RE      RUE Circumferential Total  254 cm  -RE        User Key  (r) = Recorded By, (t) = Taken By, (c) = Cosigned By    Initials Name Provider Type    RE Shavonne Gonzalez OTR Occupational Therapist                  Therapy Education  Education Details: Discussed lymphedema treatment including estimated duration. Gave patient \"Healthy Habits for Lymphedema Patients\" and \"What is Lymphedema\" and reviewed with patient. Discussed disease process and what to expect from therapy.    Given: Edema management, Symptoms/condition management  Program: New  How Provided: Verbal, Written  Provided to: Patient  Level of Understanding: Teach back education performed, Verbalized  04016 - OT Self Care/Mgmt Minutes: 15        OT Goals     Row Name 08/13/20 1700          OT Short Term Goals    STG Date to Achieve  08/27/20  -RE     STG 1  Patient will demonstrate proper awareness of “What is Lymphedema?” and “Healthy Habits” for improved prevention, management, care of symptoms and ease of transition to self-care of condition.   -RE     STG 1 Progress  New  -RE     STG 2  Patient independent and compliant with self-wrapping techniques of compression bandages with family member as indicated for improved self-management of condition.  -RE     STG 2 Progress  New  -RE     STG 3  Patient will demonstrate decreased net edema of bilateral upper extremities >/= 1-5 cm for decrease in symptoms, decreased risk of infection and improved skin care/transition to self-care of condition.   -RE     STG 3 Progress  New  " -RE        Long Term Goals    LTG Date to Achieve  09/10/20  -RE     LTG 1  Patient will demonstrate decreased net edema of bilateral upper extremities >/= 6-10 cm for decrease in symptoms, decreased risk of infection and improved skin care/transition to self-care of condition.   -RE     LTG 1 Progress  New  -RE     LTG 2  Patient independent and compliant with initial home exercise program focused on diaphragmatic breathing, range of motion, flexibility to decrease edema and improve lymphatic flow for decreased edema and decreased risk of infection.   -RE     LTG 2 Progress  New  -RE     LTG 3  Patient independent and compliant with compression garments and night compression as indicated for self-management of edema flare ups.   -RE     LTG 3 Progress  New  -RE        Time Calculation    OT Goal Re-Cert Due Date  11/13/20  -RE       User Key  (r) = Recorded By, (t) = Taken By, (c) = Cosigned By    Initials Name Provider Type    Shavonne Douglas OTR Occupational Therapist          OT Assessment/Plan     Row Name 08/13/20 1658          OT Assessment    Impairments  Impaired lymphatic circulation;Edema  -RE     Assessment Comments  Patient presents with signs and symptoms consistent with post mastectomy bilateral  UE lymphedema.  The total circumference of the R UE is 254 cm and the L UE is 252.2cm.   Edema is 2-3+  and extends from hands  to axillae. She could benefit from Complete Decongestive Therapy to decrease edema, decrease the risk of infection and to learn self care strategies.   -RE     Please refer to paper survey for additional self-reported information  Yes  -RE     OT Diagnosis  mignon post mastectomy lymphedema  -RE     OT Rehab Potential  Good  -RE     Patient/caregiver participated in establishment of treatment plan and goals  Yes  -RE     Patient would benefit from skilled therapy intervention  Yes  -RE        OT Plan    OT Frequency  3x/week  -RE     Predicted Duration of Therapy Intervention (Therapy  Eval)  4 weeks  -RE     Planned CPT's?  OT EVAL MOD COMPLEXITY: 89061;OT THER PROC EA 15 MIN: 93945UD;OT SELF CARE/MGMT/TRAIN 15 MIN: 04554;OT BIS XTRACELL FLUID ANALYSIS: 80455;OT MANUAL THERAPY EA 15 MIN: 28692  -RE     Planned Therapy Interventions (Optional Details)  home exercise program;manual therapy techniques;patient/family education;other (see comments) bioimpedance as indicated, compression bandaging and skin care  -RE     OT Plan Comments  schedule ASAP  -RE       User Key  (r) = Recorded By, (t) = Taken By, (c) = Cosigned By    Initials Name Provider Type    RE Shavonne Gonzalez OTR Occupational Therapist                    Time Calculation:   OT Start Time: 1115  OT Stop Time: 1215  OT Time Calculation (min): 60 min  Total Timed Code Minutes- OT: 15 minute(s)     Therapy Charges for Today     Code Description Service Date Service Provider Modifiers Qty    98796609017  OT SELF CARE/MGMT/TRAIN EA 15 MIN 8/13/2020 Shavonne Gonzalez OTR GO 1    [id number]  OT EVAL MOD COMPLEXITY 3 8/13/2020 Shavonne Gonzalez OTR GO 1                    PARAS Hackett  8/13/2020

## 2020-08-18 ENCOUNTER — DOCUMENTATION (OUTPATIENT)
Dept: PHYSICAL THERAPY | Facility: CLINIC | Age: 60
End: 2020-08-18

## 2020-08-31 ENCOUNTER — HOSPITAL ENCOUNTER (OUTPATIENT)
Dept: OCCUPATIONAL THERAPY | Facility: HOSPITAL | Age: 60
Setting detail: THERAPIES SERIES
Discharge: HOME | End: 2020-08-31

## 2020-08-31 DIAGNOSIS — I97.2 POST-MASTECTOMY LYMPHEDEMA SYNDROME: Primary | ICD-10-CM

## 2020-08-31 PROCEDURE — 97140 MANUAL THERAPY 1/> REGIONS: CPT

## 2020-08-31 PROCEDURE — 97535 SELF CARE MNGMENT TRAINING: CPT

## 2020-09-01 ENCOUNTER — APPOINTMENT (OUTPATIENT)
Dept: OCCUPATIONAL THERAPY | Facility: HOSPITAL | Age: 60
End: 2020-09-01

## 2020-09-03 ENCOUNTER — APPOINTMENT (OUTPATIENT)
Dept: OCCUPATIONAL THERAPY | Facility: HOSPITAL | Age: 60
End: 2020-09-03

## 2020-09-08 ENCOUNTER — APPOINTMENT (OUTPATIENT)
Dept: OCCUPATIONAL THERAPY | Facility: HOSPITAL | Age: 60
End: 2020-09-08

## 2020-09-10 ENCOUNTER — HOSPITAL ENCOUNTER (OUTPATIENT)
Dept: OCCUPATIONAL THERAPY | Facility: HOSPITAL | Age: 60
Setting detail: THERAPIES SERIES
Discharge: HOME | End: 2020-09-10

## 2020-09-10 DIAGNOSIS — I97.2 POST-MASTECTOMY LYMPHEDEMA SYNDROME: Primary | ICD-10-CM

## 2020-09-10 PROCEDURE — 97535 SELF CARE MNGMENT TRAINING: CPT

## 2020-09-10 PROCEDURE — 97140 MANUAL THERAPY 1/> REGIONS: CPT

## 2020-09-10 NOTE — THERAPY TREATMENT NOTE
Outpatient Occupational Therapy Lymphedema Treatment Note  [facility]     Patient Name: Marylu Georges  : 1960  MRN: [id number]  Today's Date: 9/10/2020      Visit Date: 09/10/2020    Patient Active Problem List   Diagnosis   • Malignant neoplasm of overlapping sites of left breast in female, estrogen receptor positive (CMS/HCC)   • Axillary mass, right   • Family history of breast cancer in female   • Syncope   • Malignant neoplasm of overlapping sites of both breasts in female, estrogen receptor positive (CMS/HCC)   • Hypertension   • Arthritis of right hip        Past Medical History:   Diagnosis Date   • Anemia in neoplastic disease    • Arthritis    • Breast cancer (CMS/HCC)     Left   • CVA (cerebral vascular accident) (CMS/HCC)    • Hip pain     RIGHT HIP... CYST   • History of fracture of leg    • History of radiation therapy     LAST TREATMENT     • Hypertension    • Limited joint range of motion (ROM)     RIGHT HIP   • Skin sore     OPEN SORE LEFT BREAST   • Syncope    • Vertigo         Past Surgical History:   Procedure Laterality Date   • AXILLARY LYMPH NODE BIOPSY/EXCISION Right     LYMPH NODE UNDER RIGHT ARM-MALIGNANT (DOUBLE MASTECTOMY)   • BREAST BIOPSY Left     MALIGNANT   • FRACTURE SURGERY      Leg   • HARDWARE REMOVAL     • MASTECTOMY W/ SENTINEL NODE BIOPSY Bilateral 2019    Procedure: BILATERLA MODIFIED RADICAL MASTECTOMY WITH BILATERAL SENTINEL LYMPH NODE BIOPSY;  Surgeon: Joby Barron Jr., MD;  Location: [facility];  Service: General   • TOTAL HIP ARTHROPLASTY Right 3/2/2020    Procedure: RIGHT TOTAL HIP ARTHROPLASTY NATALY NAVIGATION;  Surgeon: Luis M Leonard MD;  Location: [facility];  Service: Orthopedics;  Laterality: Right;   • VENOUS ACCESS DEVICE (PORT) INSERTION Right 2019    Procedure: INSERTION VENOUS ACCESS DEVICE;  Surgeon: Joby Barron Jr., MD;  Location: [facility];  Service: General   • VENOUS ACCESS DEVICE (PORT) REMOVAL  N/A 10/30/2019    Procedure: Mediport Removal;  Surgeon: Joby Barron Jr., MD;  Location: [facility] OR;  Service: General         Visit Dx:      ICD-10-CM ICD-9-CM   1. 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  yes  -RE      Pre-Treatment Pain Level  0  -RE      Post-Treatment Pain Level  0  -RE         Subjective Comments    Subjective Comments  I don't think I really have any swelling.  -RE         Compression/Skin Care    Compression/Skin Care Comments  measured for Matthew 3511 sleeves size III reg length and size 4 gauntlets  -RE        User Key  (r) = Recorded By, (t) = Taken By, (c) = Cosigned By    Initials Name Provider Type    Shavonne Douglas OTASHLEY Occupational Therapist                  OT Assessment/Plan     Row Name 09/10/20 1641          OT Assessment    Assessment Comments  No palpable edema today.  Since she has had such a significant reduction will try compression garments daily and then reassess.   -RE        OT Plan    OT Plan Comments  Re-assess when she has her compression garments  -RE       User Key  (r) = Recorded By, (t) = Taken By, (c) = Cosigned By    Initials Name Provider Type    Shavonne Douglas OTR Occupational Therapist                 Manual Rx (last 36 hours)      Manual Treatments     Row Name 09/10/20 1643             Total Minutes    46803 - OT Manual Therapy Minutes  15  -RE        User Key  (r) = Recorded By, (t) = Taken By, (c) = Cosigned By    Initials Name Provider Type    Shavonne Douglas OTR Occupational Therapist            Therapy Education  Education Details: nstructed in use and care of her compression sleeves including donning and doffing. Call with any problems. She will be contacted by [facility].  Given: Symptoms/condition management, Edema management  Program: New  How Provided: Verbal  Provided to: Patient  Level of Understanding: Teach back education  performed, Verbalized  96474 - OT Self Care/Mgmt Minutes: 30                Time Calculation:   OT Start Time: 1515  OT Stop Time: 1600  OT Time Calculation (min): 45 min  Total Timed Code Minutes- OT: 45 minute(s)     Therapy Charges for Today     Code Description Service Date Service Provider Modifiers Qty    [id number]  OT MANUAL THERAPY EA 15 MIN 9/10/2020 Shavonne Gonzalez OTR GO 1    [id number] HC OT SELF CARE/MGMT/TRAIN EA 15 MIN 9/10/2020 Shavonne Gonzalez OTR GO 2                      PARAS Hackett  9/10/2020

## 2020-09-14 ENCOUNTER — APPOINTMENT (OUTPATIENT)
Dept: OCCUPATIONAL THERAPY | Facility: HOSPITAL | Age: 60
End: 2020-09-14

## 2020-09-15 ENCOUNTER — APPOINTMENT (OUTPATIENT)
Dept: OCCUPATIONAL THERAPY | Facility: HOSPITAL | Age: 60
End: 2020-09-15

## 2020-09-17 ENCOUNTER — APPOINTMENT (OUTPATIENT)
Dept: OCCUPATIONAL THERAPY | Facility: HOSPITAL | Age: 60
End: 2020-09-17

## 2020-09-21 ENCOUNTER — OFFICE VISIT (OUTPATIENT)
Dept: ONCOLOGY | Facility: CLINIC | Age: 60
End: 2020-09-21

## 2020-09-21 ENCOUNTER — LAB (OUTPATIENT)
Dept: LAB | Facility: HOSPITAL | Age: 60
End: 2020-09-21

## 2020-09-21 ENCOUNTER — APPOINTMENT (OUTPATIENT)
Dept: OCCUPATIONAL THERAPY | Facility: HOSPITAL | Age: 60
End: 2020-09-21

## 2020-09-21 VITALS
HEART RATE: 61 BPM | DIASTOLIC BLOOD PRESSURE: 81 MMHG | RESPIRATION RATE: 18 BRPM | OXYGEN SATURATION: 98 % | WEIGHT: 160.1 LBS | SYSTOLIC BLOOD PRESSURE: 136 MMHG | TEMPERATURE: 98.2 F | BODY MASS INDEX: 26.67 KG/M2 | HEIGHT: 65 IN

## 2020-09-21 DIAGNOSIS — Z17.0 MALIGNANT NEOPLASM OF OVERLAPPING SITES OF BOTH BREASTS IN FEMALE, ESTROGEN RECEPTOR POSITIVE (HCC): ICD-10-CM

## 2020-09-21 DIAGNOSIS — I10 ESSENTIAL HYPERTENSION: ICD-10-CM

## 2020-09-21 DIAGNOSIS — Z80.3 FAMILY HISTORY OF BREAST CANCER IN FEMALE: ICD-10-CM

## 2020-09-21 DIAGNOSIS — C50.812 MALIGNANT NEOPLASM OF OVERLAPPING SITES OF LEFT BREAST IN FEMALE, ESTROGEN RECEPTOR POSITIVE (HCC): Primary | ICD-10-CM

## 2020-09-21 DIAGNOSIS — L58.0 ACUTE RADIATION DERMATITIS: ICD-10-CM

## 2020-09-21 DIAGNOSIS — D75.89 MACROCYTOSIS: ICD-10-CM

## 2020-09-21 DIAGNOSIS — C50.812 MALIGNANT NEOPLASM OF OVERLAPPING SITES OF LEFT BREAST IN FEMALE, ESTROGEN RECEPTOR POSITIVE (HCC): ICD-10-CM

## 2020-09-21 DIAGNOSIS — C50.811 MALIGNANT NEOPLASM OF OVERLAPPING SITES OF BOTH BREASTS IN FEMALE, ESTROGEN RECEPTOR POSITIVE (HCC): ICD-10-CM

## 2020-09-21 DIAGNOSIS — R55 SYNCOPE, UNSPECIFIED SYNCOPE TYPE: ICD-10-CM

## 2020-09-21 DIAGNOSIS — C50.812 MALIGNANT NEOPLASM OF OVERLAPPING SITES OF BOTH BREASTS IN FEMALE, ESTROGEN RECEPTOR POSITIVE (HCC): ICD-10-CM

## 2020-09-21 DIAGNOSIS — Z17.0 MALIGNANT NEOPLASM OF OVERLAPPING SITES OF LEFT BREAST IN FEMALE, ESTROGEN RECEPTOR POSITIVE (HCC): ICD-10-CM

## 2020-09-21 DIAGNOSIS — Z17.0 MALIGNANT NEOPLASM OF OVERLAPPING SITES OF LEFT BREAST IN FEMALE, ESTROGEN RECEPTOR POSITIVE (HCC): Primary | ICD-10-CM

## 2020-09-21 LAB
ALBUMIN SERPL-MCNC: 4.6 G/DL (ref 3.5–5.2)
ALBUMIN/GLOB SERPL: 2.1 G/DL (ref 1.1–2.4)
ALP SERPL-CCNC: 95 U/L (ref 38–116)
ALT SERPL W P-5'-P-CCNC: 22 U/L (ref 0–33)
ANION GAP SERPL CALCULATED.3IONS-SCNC: 10.1 MMOL/L (ref 5–15)
AST SERPL-CCNC: 27 U/L (ref 0–32)
BASOPHILS # BLD AUTO: 0.04 10*3/MM3 (ref 0–0.2)
BASOPHILS NFR BLD AUTO: 0.7 % (ref 0–1.5)
BILIRUB SERPL-MCNC: 0.2 MG/DL (ref 0.2–1.2)
BUN SERPL-MCNC: 23 MG/DL (ref 6–20)
BUN/CREAT SERPL: 25.8 (ref 7.3–30)
CALCIUM SPEC-SCNC: 9.4 MG/DL (ref 8.5–10.2)
CANCER AG15-3 SERPL-ACNC: 11.9 U/ML
CHLORIDE SERPL-SCNC: 105 MMOL/L (ref 98–107)
CO2 SERPL-SCNC: 25.9 MMOL/L (ref 22–29)
CREAT SERPL-MCNC: 0.89 MG/DL (ref 0.6–1.1)
DEPRECATED RDW RBC AUTO: 49.2 FL (ref 37–54)
EOSINOPHIL # BLD AUTO: 0.09 10*3/MM3 (ref 0–0.4)
EOSINOPHIL NFR BLD AUTO: 1.5 % (ref 0.3–6.2)
ERYTHROCYTE [DISTWIDTH] IN BLOOD BY AUTOMATED COUNT: 14 % (ref 12.3–15.4)
GFR SERPL CREATININE-BSD FRML MDRD: 64 ML/MIN/1.73
GLOBULIN UR ELPH-MCNC: 2.2 GM/DL (ref 1.8–3.5)
GLUCOSE SERPL-MCNC: 101 MG/DL (ref 74–124)
HCT VFR BLD AUTO: 39.1 % (ref 34–46.6)
HGB BLD-MCNC: 12.3 G/DL (ref 12–15.9)
IMM GRANULOCYTES # BLD AUTO: 0.01 10*3/MM3 (ref 0–0.05)
IMM GRANULOCYTES NFR BLD AUTO: 0.2 % (ref 0–0.5)
LYMPHOCYTES # BLD AUTO: 0.8 10*3/MM3 (ref 0.7–3.1)
LYMPHOCYTES NFR BLD AUTO: 13.5 % (ref 19.6–45.3)
MCH RBC QN AUTO: 30.1 PG (ref 26.6–33)
MCHC RBC AUTO-ENTMCNC: 31.5 G/DL (ref 31.5–35.7)
MCV RBC AUTO: 95.6 FL (ref 79–97)
MONOCYTES # BLD AUTO: 0.39 10*3/MM3 (ref 0.1–0.9)
MONOCYTES NFR BLD AUTO: 6.6 % (ref 5–12)
NEUTROPHILS NFR BLD AUTO: 4.61 10*3/MM3 (ref 1.7–7)
NEUTROPHILS NFR BLD AUTO: 77.5 % (ref 42.7–76)
NRBC BLD AUTO-RTO: 0 /100 WBC (ref 0–0.2)
PLATELET # BLD AUTO: 186 10*3/MM3 (ref 140–450)
PMV BLD AUTO: 8.3 FL (ref 6–12)
POTASSIUM SERPL-SCNC: 4.2 MMOL/L (ref 3.5–4.7)
PROT SERPL-MCNC: 6.8 G/DL (ref 6.3–8)
RBC # BLD AUTO: 4.09 10*6/MM3 (ref 3.77–5.28)
SODIUM SERPL-SCNC: 141 MMOL/L (ref 134–145)
WBC # BLD AUTO: 5.94 10*3/MM3 (ref 3.4–10.8)

## 2020-09-21 PROCEDURE — 99213 OFFICE O/P EST LOW 20 MIN: CPT | Performed by: NURSE PRACTITIONER

## 2020-09-21 PROCEDURE — 80053 COMPREHEN METABOLIC PANEL: CPT

## 2020-09-21 PROCEDURE — 36415 COLL VENOUS BLD VENIPUNCTURE: CPT

## 2020-09-21 PROCEDURE — 85025 COMPLETE CBC W/AUTO DIFF WBC: CPT

## 2020-09-21 PROCEDURE — 86300 IMMUNOASSAY TUMOR CA 15-3: CPT | Performed by: INTERNAL MEDICINE

## 2020-09-21 NOTE — PROGRESS NOTES
Subjective     REASON FOR FOLLOW UP:  1. 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 R BREAST MASS WITH GIANT R AXILLARY ADENOPATHY.  3. FAMILY HISTORY OF BREAST CANCER IN MOTHER AND SISTER, SISTER WAS TESTED FOR BRCA AND WAS NEGATIVE.                          History of Present Illness the patient is a 60-year-old female with the above-mentioned history who is here today for lab review and follow-up, continuing on letrozole.  She feels like overall, she is tolerating this well.  She does have some arthralgias, but states that they are minimal and tolerable.  She denies issues with hot flashes.  The patient continues to be quite active, continuing to work at [employer].  She denies any issues with shortness of breath.  No new palpable masses or abnormalities.      Past Medical History:   Diagnosis Date   • Anemia in neoplastic disease    • Arthritis    • Breast cancer (CMS/HCC)     Left   • CVA (cerebral vascular accident) (CMS/HCC)    • Hip pain     RIGHT HIP... CYST   • History of fracture of leg 1987   • History of radiation therapy     LAST TREATMENT     • Hypertension    • Limited joint range of motion (ROM)     RIGHT HIP   • Skin sore     OPEN SORE LEFT BREAST   • Syncope    • Vertigo            Past Surgical History:   Procedure Laterality Date   • AXILLARY LYMPH NODE BIOPSY/EXCISION Right     LYMPH NODE UNDER RIGHT ARM-MALIGNANT (DOUBLE MASTECTOMY)   • BREAST BIOPSY Left     MALIGNANT   • FRACTURE SURGERY  1987    Leg   • HARDWARE REMOVAL     • MASTECTOMY W/ SENTINEL NODE BIOPSY Bilateral 9/16/2019    Procedure: BILATERLA MODIFIED RADICAL MASTECTOMY WITH BILATERAL SENTINEL LYMPH NODE BIOPSY;  Surgeon: Joby Barron Jr., MD;  Location: [facility];  Service: General   • TOTAL HIP ARTHROPLASTY Right 3/2/2020    Procedure: RIGHT TOTAL HIP ARTHROPLASTY NATALY NAVIGATION;  Surgeon: Luis M Leonard MD;  Location: [facility];  Service: Orthopedics;  Laterality:  Right;   • VENOUS ACCESS DEVICE (PORT) INSERTION Right 2/1/2019    Procedure: INSERTION VENOUS ACCESS DEVICE;  Surgeon: Joby Barron Jr., MD;  Location: [facility] OR [facility];  Service: General   • VENOUS ACCESS DEVICE (PORT) REMOVAL N/A 10/30/2019    Procedure: Mediport Removal;  Surgeon: Joby Barron Jr., MD;  Location: [facility] MAIN OR;  Service: General        Current Outpatient Medications on File Prior to Visit   Medication Sig Dispense Refill   • cephalexin (KEFLEX) 500 MG capsule Take 1 capsule by mouth 4 (Four) Times a Day. Start if pain erythema or fever in right arm, they travel with you 32 capsule 4   • diclofenac (VOLTAREN) 75 MG EC tablet Take 75 mg by mouth 2 (Two) Times a Day.     • letrozole (FEMARA) 2.5 MG tablet TAKE 1 TABLET BY MOUTH EVERY DAY 90 tablet 2   • metoprolol succinate XL (Toprol XL) 25 MG 24 hr tablet Take 1 tablet by mouth Daily. 30 tablet 6   • [DISCONTINUED] diclofenac (VOLTAREN) 1 % gel gel Apply pea size amount to area of pain up to four times a day 4 tube 3     No current facility-administered medications on file prior to visit.         ALLERGIES:    Allergies   Allergen Reactions   • Benadryl [Diphenhydramine] Itching and Other (See Comments)     INCREASES BLOOD PRESSURE   • Erythromycin GI Intolerance   • Levaquin [Levofloxacin] GI Intolerance   • Penicillins GI Intolerance        Social History     Socioeconomic History   • Marital status:      Spouse name: Cruz   • Number of children: 0   • Years of education: Not on file   • Highest education level: Not on file   Occupational History   • Occupation:      Employer: SELF-EMPLOYED   Social Needs   • Financial resource strain: Not hard at all   • Food insecurity     Worry: Never true     Inability: Never true   • Transportation needs     Medical: No     Non-medical: No   Tobacco Use   • Smoking status: Never Smoker   • Smokeless tobacco: Never Used   Substance and Sexual Activity   • Alcohol use: Not Currently  "    Alcohol/week: 7.0 standard drinks     Types: 4 Glasses of wine, 3 Cans of beer per week     Frequency: 2-3 times a week     Drinks per session: 1 or 2     Binge frequency: Never   • Drug use: No   • Sexual activity: Not Currently     Partners: Male     Birth control/protection: Post-menopausal   Lifestyle   • Physical activity     Days per week: 7 days     Minutes per session: 120 min   • Stress: Only a little        Family History   Problem Relation Age of Onset   • Lung cancer Father    • Cancer Mother    • Breast cancer Mother    • Liver disease Mother    • Breast cancer Sister 48   • Breast cancer Maternal Grandmother    • Breast cancer Paternal Grandmother    • Breast cancer Maternal Uncle    • Malig Hyperthermia Neg Hx         Review of Systems   Constitutional: Negative for activity change, appetite change, chills, fatigue and fever.   HENT: Negative for mouth sores, nosebleeds and trouble swallowing.    Respiratory: Negative for cough and shortness of breath.    Cardiovascular: Negative for chest pain and leg swelling.   Gastrointestinal: Negative for abdominal pain, constipation, diarrhea, nausea and vomiting.   Genitourinary: Negative for difficulty urinating.   Musculoskeletal: Positive for arthralgias.   Skin: Negative for rash.   Neurological: Negative for dizziness, weakness and numbness.   Hematological: Negative for adenopathy. Does not bruise/bleed easily.   Psychiatric/Behavioral: Negative for sleep disturbance.       Objective     Vitals:    09/21/20 1349   BP: 136/81   Pulse: 61   Resp: 18   Temp: 98.2 °F (36.8 °C)   TempSrc: Temporal   SpO2: 98%   Weight: 72.6 kg (160 lb 1.6 oz)   Height: 165.1 cm (65\")   PainSc: 0-No pain     Current Status 9/21/2020   ECOG score 0       Physical Exam   Constitutional: She is oriented to person, place, and time. She appears well-developed. No distress.   HENT:   Head: Normocephalic and atraumatic.   Mouth/Throat: No oropharyngeal exudate.   Eyes: Pupils are " equal, round, and reactive to light.   Neck: Normal range of motion.   Cardiovascular: Normal rate, regular rhythm and normal heart sounds.   No murmur heard.  Pulmonary/Chest: Effort normal and breath sounds normal. No respiratory distress. She has no wheezes. She has no rhonchi. She has no rales.   BREAST: STATUS post bilateral mastectomy.  Chest walls are well-healed there is no palpable mass or nodularity.  Axilla bilaterally free of adenopathy.  Mild hyperpigmentation.   Abdominal: Soft. Normal appearance and bowel sounds are normal. She exhibits no distension.   Musculoskeletal: Normal range of motion.   Neurological: She is alert and oriented to person, place, and time.   Skin: Skin is warm and dry. No rash noted.   Vitals reviewed.        RECENT LABS:  Hematology WBC   Date Value Ref Range Status   09/21/2020 5.94 3.40 - 10.80 10*3/mm3 Final     RBC   Date Value Ref Range Status   09/21/2020 4.09 3.77 - 5.28 10*6/mm3 Final     Hemoglobin   Date Value Ref Range Status   09/21/2020 12.3 12.0 - 15.9 g/dL Final     Hematocrit   Date Value Ref Range Status   09/21/2020 39.1 34.0 - 46.6 % Final     Platelets   Date Value Ref Range Status   09/21/2020 186 140 - 450 10*3/mm3 Final              Assessment/Plan    1.  History of least for the last 4 years, she has had an in crescendo mass in the left breast that has produced a gigantic tumor that is now close to 25 cm in size and is replacing most of the anatomy of the left breast. There are areas of ulceration necrosis and bleeding and the mass is fixed to the chest wall. She has abundant amount of collateral circulation in the left anterior chest wall and it caught my attention the lack of any left axillary adenopathy. She has no lymphedema in the left upper extremity. In the right breast while in sitting position, no palpable abnormalities are documented. The right breast skin and nipple are normal. When the patient lies flat, there is a palpable mass at 9 o'clock  from the nipple that measures probably 4 cm in size, very indistinct in regard to edges and margins. There was no mobility and no areas of tenderness. She has a giant adenopathy in the right axilla that measures close to 9 cm in size, uniform, no fluctuation, nontender, completely fixed to the axillary wall. There is no compromise of the skin.     After completion of 4 cycles of AC patient has had very dramatic improvement in all sites of disease including right axilla and left breast.    · Completed 4 cycles Adriamycin Cytoxan on 4/12/2019.    · Patient started weekly Taxol treatments on 5/3/2019.  · Completed 12 weekly Taxol treatments on 7/19/2019.  · 9/16/2019 bilateral mastectomy by Dr. Joby Barorn with axillary lymph node dissection.  No residual malignancy.  · 10/30/2019 patient's Mediport was removed in anticipation of radiation.  · Radiation therapy under the care of Dr. Marmolejo 10/31/2019-1/13/2020 to the right chest wall.  · Started on adjuvant Femara 2.5 mg daily 8/20/2019.  · 9/21/2020 continues on adjuvant Femara, tolerating it quite well.  Some mild hot flashes and mild arthralgias, all which are tolerable.      2.  Mild elevation in liver function: ALT 38, AST 33.  Normal bilirubin and alkaline phosphatase.  Continue to closely monitor.     3.  Osteopenia: Bone density 8/13/2019.    4.  Right upper extremity lymphedema: Has been seen by lymphedema clinic and will have a compression sleeve to wear.    PLAN:  1. Continue letrozole 2.5 mg daily.  2. Return in 3 months for follow-up visit with Dr. Dixon with repeat labs.  3. We discussed there is no need for radiologic assessment unless the patient were to develop new symptoms.  We have reviewed signs and symptoms for which she should call the office.

## 2020-09-22 ENCOUNTER — APPOINTMENT (OUTPATIENT)
Dept: OCCUPATIONAL THERAPY | Facility: HOSPITAL | Age: 60
End: 2020-09-22

## 2020-09-24 ENCOUNTER — APPOINTMENT (OUTPATIENT)
Dept: OCCUPATIONAL THERAPY | Facility: HOSPITAL | Age: 60
End: 2020-09-24

## 2020-09-28 ENCOUNTER — APPOINTMENT (OUTPATIENT)
Dept: OCCUPATIONAL THERAPY | Facility: HOSPITAL | Age: 60
End: 2020-09-28

## 2020-10-29 ENCOUNTER — OFFICE VISIT (OUTPATIENT)
Dept: ORTHOPEDIC SURGERY | Facility: CLINIC | Age: 60
End: 2020-10-29

## 2020-10-29 VITALS — WEIGHT: 158 LBS | BODY MASS INDEX: 26.33 KG/M2 | HEIGHT: 65 IN | TEMPERATURE: 97.1 F

## 2020-10-29 DIAGNOSIS — M25.552 LEFT HIP PAIN: Primary | ICD-10-CM

## 2020-10-29 DIAGNOSIS — Z96.641 STATUS POST TOTAL REPLACEMENT OF RIGHT HIP: ICD-10-CM

## 2020-10-29 DIAGNOSIS — M16.12 ARTHRITIS OF LEFT HIP: ICD-10-CM

## 2020-10-29 PROCEDURE — 99213 OFFICE O/P EST LOW 20 MIN: CPT | Performed by: ORTHOPAEDIC SURGERY

## 2020-10-29 PROCEDURE — 73502 X-RAY EXAM HIP UNI 2-3 VIEWS: CPT | Performed by: ORTHOPAEDIC SURGERY

## 2020-10-29 RX ORDER — MECLIZINE HYDROCHLORIDE 25 MG/1
25 TABLET ORAL 3 TIMES DAILY PRN
COMMUNITY
End: 2020-12-14 | Stop reason: SDUPTHER

## 2020-10-29 NOTE — PROGRESS NOTES
Patient Name: Marylu Georges   YOB: 1960  Referring Primary Care Physician: Provider, No Known  BMI: Body mass index is 26.29 kg/m².    Chief Complaint:    Chief Complaint   Patient presents with   • Left Hip - Establish Care        HPI:     Marylu Georges is a 60 y.o. female who presents today for evaluation of   Chief Complaint   Patient presents with   • Left Hip - Establish Care   .  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  Says she is getting some left hip and groin pain and she want to get it checked.  She had the total hip about 9 months ago.  Is getting achy left hip and groin pain that is intermittently more bothersome than others.  She not really taking anti-inflammatories for it.      Subjective   Medications:   Home Medications:  Current Outpatient Medications on File Prior to Visit   Medication Sig   • diclofenac (VOLTAREN) 75 MG EC tablet Take 75 mg by mouth 2 (Two) Times a Day.   • letrozole (FEMARA) 2.5 MG tablet TAKE 1 TABLET BY MOUTH EVERY DAY   • meclizine (ANTIVERT) 25 MG tablet Take 25 mg by mouth 3 (Three) Times a Day As Needed for Dizziness.   • metoprolol succinate XL (Toprol XL) 25 MG 24 hr tablet Take 1 tablet by mouth Daily.   • cephalexin (KEFLEX) 500 MG capsule Take 1 capsule by mouth 4 (Four) Times a Day. Start if pain erythema or fever in right arm, they travel with you     No current facility-administered medications on file prior to visit.      Current Medications:  Scheduled Meds:  Continuous Infusions:No current facility-administered medications for this visit.     PRN Meds:.    I have reviewed the patient's medical history in detail and updated the computerized patient record.  Review and summarization of old records includes:    Past Medical History:   Diagnosis Date   • Anemia in neoplastic disease    • Arthritis    • Breast cancer (CMS/HCC)     Left   • CVA  (cerebral vascular accident) (CMS/HCC)    • Hip pain     RIGHT HIP... CYST   • History of fracture of leg 1987   • History of radiation therapy     LAST TREATMENT     • Hypertension    • Limited joint range of motion (ROM)     RIGHT HIP   • Skin sore     OPEN SORE LEFT BREAST   • Syncope    • Vertigo         Past Surgical History:   Procedure Laterality Date   • AXILLARY LYMPH NODE BIOPSY/EXCISION Right     LYMPH NODE UNDER RIGHT ARM-MALIGNANT (DOUBLE MASTECTOMY)   • BREAST BIOPSY Left     MALIGNANT   • FRACTURE SURGERY  1987    Leg   • HARDWARE REMOVAL     • MASTECTOMY W/ SENTINEL NODE BIOPSY Bilateral 9/16/2019    Procedure: BILATERLA MODIFIED RADICAL MASTECTOMY WITH BILATERAL SENTINEL LYMPH NODE BIOPSY;  Surgeon: Joby Barron Jr., MD;  Location: [facility] MAIN OR;  Service: General   • TOTAL HIP ARTHROPLASTY Right 3/2/2020    Procedure: RIGHT TOTAL HIP ARTHROPLASTY NATALY NAVIGATION;  Surgeon: Luis M Leonard MD;  Location: [facility] OR;  Service: Orthopedics;  Laterality: Right;   • VENOUS ACCESS DEVICE (PORT) INSERTION Right 2/1/2019    Procedure: INSERTION VENOUS ACCESS DEVICE;  Surgeon: Joby Barron Jr., MD;  Location: [facility];  Service: General   • VENOUS ACCESS DEVICE (PORT) REMOVAL N/A 10/30/2019    Procedure: Mediport Removal;  Surgeon: Joby Barron Jr., MD;  Location: [facility] OR;  Service: General        Social History     Occupational History   • Occupation:      Employer: SELF-EMPLOYED   Tobacco Use   • Smoking status: Never Smoker   • Smokeless tobacco: Never Used   Substance and Sexual Activity   • Alcohol use: Not Currently     Alcohol/week: 7.0 standard drinks     Types: 4 Glasses of wine, 3 Cans of beer per week     Frequency: 2-3 times a week     Drinks per session: 1 or 2     Binge frequency: Never   • Drug use: No   • Sexual activity: Not Currently     Partners: Male     Birth control/protection: Post-menopausal      Social History     Social History  "Narrative   • Not on file        Family History   Problem Relation Age of Onset   • Lung cancer Father    • Cancer Mother    • Breast cancer Mother    • Liver disease Mother    • Breast cancer Sister 48   • Breast cancer Maternal Grandmother    • Breast cancer Paternal Grandmother    • Breast cancer Maternal Uncle    • Malig Hyperthermia Neg Hx        ROS: 14 point review of systems was performed and all other systems were reviewed and are negative except for documented findings in HPI and today's encounter.     Allergies:   Allergies   Allergen Reactions   • Benadryl [Diphenhydramine] Itching and Other (See Comments)     INCREASES BLOOD PRESSURE   • Erythromycin GI Intolerance   • Levaquin [Levofloxacin] GI Intolerance   • Penicillins GI Intolerance     Constitutional:  Denies fever, shaking or chills   Eyes:  Denies change in visual acuity   HENT:  Denies nasal congestion or sore throat   Respiratory:  Denies cough or shortness of breath   Cardiovascular:  Denies chest pain or severe LE edema   GI:  Denies abdominal pain, nausea, vomiting, bloody stools or diarrhea   Musculoskeletal:  Numbness, tingling, pain, or loss of motor function only as noted above in history of present illness.  : Denies painful urination or hematuria  Integument:  Denies rash, lesion or ulceration   Neurologic:  Denies headache or focal weakness  Endocrine:  Denies lymphadenopathy  Psych:  Denies confusion or change in mental status   Hem:  Denies active bleeding    OBJECTIVE:  Physical Exam: 60 y.o. female  Wt Readings from Last 3 Encounters:   10/29/20 71.7 kg (158 lb)   09/21/20 72.6 kg (160 lb 1.6 oz)   06/29/20 73.6 kg (162 lb 4.8 oz)     Ht Readings from Last 1 Encounters:   10/29/20 165.1 cm (65\")     Body mass index is 26.29 kg/m².  Vitals:    10/29/20 1105   Temp: 97.1 °F (36.2 °C)     Vital signs reviewed.     General Appearance:    Alert, cooperative, in no acute distress                  Eyes: conjunctiva clear  ENT: " external ears and nose atraumatic  CV: no peripheral edema  Resp: normal respiratory effort  Skin: no rashes or wounds; normal turgor  Psych: mood and affect appropriate  Lymph: no nodes appreciated  Neuro: gross sensation intact  Vascular:  Palpable peripheral pulse in noted extremity  Musculoskeletal Extremities: Exam today shows Stinchfield mild to moderately positive on the left with some pain within decreased rotation but is not severe internal rotation is decreased good range of motion no tenderness in the right leg.  She does not have any SI or trochanteric pain noted.    Radiology:   AP of the hips lateral left hip taken the office today with comparison views do show at least moderate arthritic change in her hip.  She has some superior acetabular cysts that indicate more arthritis than the normal joint space in the superior weightbearing portion would let on.  Right total hip appears to be in excellent condition with good fixation and good leg lengths    Assessment:     ICD-10-CM ICD-9-CM   1. Left hip pain  M25.552 719.45   2. Arthritis of left hip  M16.12 716.95   3. Status post total replacement of right hip  Z96.641 V43.64        Procedures       Plan: The diagnosis(es), natural history, pathophysiology and treatment for diagnosis(es) were discussed. Opportunity given and questions answered.  Biomechanics of pertinent body areas discussed.  When appropriate, the use of ambulatory aids discussed.  EXERCISES:  Advice on benefits of, and types of regular/moderate exercise pertaining to orthopedic diagnosis(es).  MEDICATIONS:  The risks, benefits, warnings,side effects and alternatives of medications discussed.  Inflammation/pain control; with cold, heat, elevation and/or liniments discussed as appropriate she has been riding a lot at the track and will be closed in a month and she will get to rest it.  She could try Tylenol etc. for it to see if it helps.  If it bothers her a lot we could send her for an  injection intra-articularly but is not bothering to that extent today.  Went over the different possibilities and answer questions      10/29/2020    Much of this encounter note is an electronic transcription/translation of spoken language to printed text. The electronic translation of spoken language may permit erroneous, or at times, nonsensical words or phrases to be inadvertently transcribed; Although I have reviewed the note for such errors, some may still exist    Answers for HPI/ROS submitted by the patient on 10/25/2020   What is the primary reason for your visit?: Other  Please describe your symptoms.: have pain in left hip  Have you had these symptoms before?: No  How long have you been having these symptoms?: Greater than 2 weeks  Please list any medications you are currently taking for this condition.: tylenol, doesn't really help  Please describe any probable cause for these symptoms. : suspect hip deterioration due to arthritis.

## 2020-12-14 ENCOUNTER — TELEPHONE (OUTPATIENT)
Dept: ONCOLOGY | Facility: CLINIC | Age: 60
End: 2020-12-14

## 2020-12-14 RX ORDER — ONDANSETRON 4 MG/1
4 TABLET, FILM COATED ORAL EVERY 8 HOURS PRN
Qty: 90 TABLET | Refills: 0 | Status: CANCELLED | OUTPATIENT
Start: 2020-12-14

## 2020-12-14 RX ORDER — MECLIZINE HYDROCHLORIDE 25 MG/1
25 TABLET ORAL 3 TIMES DAILY PRN
Qty: 30 TABLET | Refills: 0 | Status: CANCELLED | OUTPATIENT
Start: 2020-12-14

## 2020-12-14 RX ORDER — ONDANSETRON 4 MG/1
4 TABLET, FILM COATED ORAL EVERY 8 HOURS PRN
Qty: 90 TABLET | Refills: 0 | Status: SHIPPED | OUTPATIENT
Start: 2020-12-14 | End: 2021-08-04

## 2020-12-14 RX ORDER — MECLIZINE HYDROCHLORIDE 25 MG/1
25 TABLET ORAL 3 TIMES DAILY PRN
Qty: 90 TABLET | Refills: 0 | Status: SHIPPED | OUTPATIENT
Start: 2020-12-14 | End: 2022-07-13 | Stop reason: HOSPADM

## 2020-12-14 NOTE — TELEPHONE ENCOUNTER
Pt has Vertigo with nausea. Mexlazine and Zofran was prescribed in the past.Scripts sent to Dr. Dixon to see if he will refill both. Pt V/U.

## 2020-12-14 NOTE — TELEPHONE ENCOUNTER
Caller: PT    Relationship to patient: PT    Best call back number: [phone]    PT WOULD PREFER TO SEE DR. JOSUE FOR NEXT APPT.     PT ALSO HAS A FEW QUESTIONS REGARDING VERTIGO.    PLEASE RETURN CALL

## 2020-12-28 ENCOUNTER — APPOINTMENT (OUTPATIENT)
Dept: LAB | Facility: HOSPITAL | Age: 60
End: 2020-12-28

## 2021-01-14 RX ORDER — METOPROLOL SUCCINATE 25 MG/1
TABLET, EXTENDED RELEASE ORAL
Qty: 30 TABLET | Refills: 6 | Status: SHIPPED | OUTPATIENT
Start: 2021-01-14 | End: 2021-08-16

## 2021-01-19 DIAGNOSIS — C50.812 MALIGNANT NEOPLASM OF OVERLAPPING SITES OF LEFT BREAST IN FEMALE, ESTROGEN RECEPTOR POSITIVE (HCC): Primary | ICD-10-CM

## 2021-01-19 DIAGNOSIS — Z17.0 MALIGNANT NEOPLASM OF OVERLAPPING SITES OF LEFT BREAST IN FEMALE, ESTROGEN RECEPTOR POSITIVE (HCC): Primary | ICD-10-CM

## 2021-01-21 ENCOUNTER — OFFICE VISIT (OUTPATIENT)
Dept: ONCOLOGY | Facility: CLINIC | Age: 61
End: 2021-01-21

## 2021-01-21 ENCOUNTER — LAB (OUTPATIENT)
Dept: LAB | Facility: HOSPITAL | Age: 61
End: 2021-01-21

## 2021-01-21 VITALS
TEMPERATURE: 98.2 F | DIASTOLIC BLOOD PRESSURE: 91 MMHG | SYSTOLIC BLOOD PRESSURE: 158 MMHG | RESPIRATION RATE: 20 BRPM | HEART RATE: 61 BPM | OXYGEN SATURATION: 99 % | HEIGHT: 65 IN | BODY MASS INDEX: 27.14 KG/M2 | WEIGHT: 162.9 LBS

## 2021-01-21 DIAGNOSIS — C50.812 MALIGNANT NEOPLASM OF OVERLAPPING SITES OF LEFT BREAST IN FEMALE, ESTROGEN RECEPTOR POSITIVE (HCC): Primary | ICD-10-CM

## 2021-01-21 DIAGNOSIS — Z17.0 MALIGNANT NEOPLASM OF OVERLAPPING SITES OF BOTH BREASTS IN FEMALE, ESTROGEN RECEPTOR POSITIVE (HCC): ICD-10-CM

## 2021-01-21 DIAGNOSIS — Z80.3 FAMILY HISTORY OF BREAST CANCER IN FEMALE: ICD-10-CM

## 2021-01-21 DIAGNOSIS — Z17.0 MALIGNANT NEOPLASM OF OVERLAPPING SITES OF LEFT BREAST IN FEMALE, ESTROGEN RECEPTOR POSITIVE (HCC): ICD-10-CM

## 2021-01-21 DIAGNOSIS — C50.812 MALIGNANT NEOPLASM OF OVERLAPPING SITES OF LEFT BREAST IN FEMALE, ESTROGEN RECEPTOR POSITIVE (HCC): ICD-10-CM

## 2021-01-21 DIAGNOSIS — C50.812 MALIGNANT NEOPLASM OF OVERLAPPING SITES OF BOTH BREASTS IN FEMALE, ESTROGEN RECEPTOR POSITIVE (HCC): ICD-10-CM

## 2021-01-21 DIAGNOSIS — C50.811 MALIGNANT NEOPLASM OF OVERLAPPING SITES OF BOTH BREASTS IN FEMALE, ESTROGEN RECEPTOR POSITIVE (HCC): ICD-10-CM

## 2021-01-21 DIAGNOSIS — Z17.0 MALIGNANT NEOPLASM OF OVERLAPPING SITES OF LEFT BREAST IN FEMALE, ESTROGEN RECEPTOR POSITIVE (HCC): Primary | ICD-10-CM

## 2021-01-21 DIAGNOSIS — I10 ESSENTIAL HYPERTENSION: ICD-10-CM

## 2021-01-21 PROBLEM — M16.11 ARTHRITIS OF RIGHT HIP: Status: RESOLVED | Noted: 2020-01-13 | Resolved: 2021-01-21

## 2021-01-21 PROBLEM — R22.31 AXILLARY MASS, RIGHT: Status: RESOLVED | Noted: 2019-01-22 | Resolved: 2021-01-21

## 2021-01-21 LAB
ALBUMIN SERPL-MCNC: 4.4 G/DL (ref 3.5–5.2)
ALBUMIN/GLOB SERPL: 1.9 G/DL (ref 1.1–2.4)
ALP SERPL-CCNC: 97 U/L (ref 38–116)
ALT SERPL W P-5'-P-CCNC: 27 U/L (ref 0–33)
ANION GAP SERPL CALCULATED.3IONS-SCNC: 8.9 MMOL/L (ref 5–15)
AST SERPL-CCNC: 28 U/L (ref 0–32)
BASOPHILS # BLD AUTO: 0.04 10*3/MM3 (ref 0–0.2)
BASOPHILS NFR BLD AUTO: 1.2 % (ref 0–1.5)
BILIRUB SERPL-MCNC: 0.3 MG/DL (ref 0.2–1.2)
BUN SERPL-MCNC: 23 MG/DL (ref 6–20)
BUN/CREAT SERPL: 28.4 (ref 7.3–30)
CALCIUM SPEC-SCNC: 9.7 MG/DL (ref 8.5–10.2)
CANCER AG15-3 SERPL-ACNC: 14.2 U/ML
CHLORIDE SERPL-SCNC: 105 MMOL/L (ref 98–107)
CO2 SERPL-SCNC: 26.1 MMOL/L (ref 22–29)
CREAT SERPL-MCNC: 0.81 MG/DL (ref 0.6–1.1)
DEPRECATED RDW RBC AUTO: 56.1 FL (ref 37–54)
EOSINOPHIL # BLD AUTO: 0.13 10*3/MM3 (ref 0–0.4)
EOSINOPHIL NFR BLD AUTO: 3.8 % (ref 0.3–6.2)
ERYTHROCYTE [DISTWIDTH] IN BLOOD BY AUTOMATED COUNT: 15.6 % (ref 12.3–15.4)
GFR SERPL CREATININE-BSD FRML MDRD: 71 ML/MIN/1.73
GLOBULIN UR ELPH-MCNC: 2.3 GM/DL (ref 1.8–3.5)
GLUCOSE SERPL-MCNC: 95 MG/DL (ref 74–124)
HCT VFR BLD AUTO: 39.2 % (ref 34–46.6)
HGB BLD-MCNC: 12 G/DL (ref 12–15.9)
IMM GRANULOCYTES # BLD AUTO: 0.01 10*3/MM3 (ref 0–0.05)
IMM GRANULOCYTES NFR BLD AUTO: 0.3 % (ref 0–0.5)
LYMPHOCYTES # BLD AUTO: 0.94 10*3/MM3 (ref 0.7–3.1)
LYMPHOCYTES NFR BLD AUTO: 27.7 % (ref 19.6–45.3)
MCH RBC QN AUTO: 29.9 PG (ref 26.6–33)
MCHC RBC AUTO-ENTMCNC: 30.6 G/DL (ref 31.5–35.7)
MCV RBC AUTO: 97.5 FL (ref 79–97)
MONOCYTES # BLD AUTO: 0.29 10*3/MM3 (ref 0.1–0.9)
MONOCYTES NFR BLD AUTO: 8.6 % (ref 5–12)
NEUTROPHILS NFR BLD AUTO: 1.98 10*3/MM3 (ref 1.7–7)
NEUTROPHILS NFR BLD AUTO: 58.4 % (ref 42.7–76)
NRBC BLD AUTO-RTO: 0 /100 WBC (ref 0–0.2)
PLATELET # BLD AUTO: 208 10*3/MM3 (ref 140–450)
PMV BLD AUTO: 8.3 FL (ref 6–12)
POTASSIUM SERPL-SCNC: 4.7 MMOL/L (ref 3.5–4.7)
PROT SERPL-MCNC: 6.7 G/DL (ref 6.3–8)
RBC # BLD AUTO: 4.02 10*6/MM3 (ref 3.77–5.28)
SODIUM SERPL-SCNC: 140 MMOL/L (ref 134–145)
WBC # BLD AUTO: 3.39 10*3/MM3 (ref 3.4–10.8)

## 2021-01-21 PROCEDURE — 36415 COLL VENOUS BLD VENIPUNCTURE: CPT

## 2021-01-21 PROCEDURE — 85025 COMPLETE CBC W/AUTO DIFF WBC: CPT

## 2021-01-21 PROCEDURE — 99214 OFFICE O/P EST MOD 30 MIN: CPT | Performed by: INTERNAL MEDICINE

## 2021-01-21 PROCEDURE — 86300 IMMUNOASSAY TUMOR CA 15-3: CPT | Performed by: INTERNAL MEDICINE

## 2021-01-21 PROCEDURE — 80053 COMPREHEN METABOLIC PANEL: CPT

## 2021-01-21 NOTE — PROGRESS NOTES
Subjective     REASON FOR FOLLOW UP:  1. 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 R BREAST MASS WITH GIANT R AXILLARY ADENOPATHY.  3. FAMILY HISTORY OF BREAST CANCER IN MOTHER AND SISTER, SISTER WAS TESTED FOR BRCA AND WAS NEGATIVE.                          History of Present Illness PATIENT WAS CALLED THE DAY BEFORE BY THE OFFICE TO ASK FOR SYMPTOMS THAT COULD BE CONSISTENT WITH CORONAVIRUS INFECTION, AND BEING NEGATIVE WAS SCHEDULED TO BE SEEN IN THE OFFICE TODAY. SIMILAR QUESTIONING TODAY INCLUDING, CHILLS, FEVER, NEW COUGH, SHORTNESS OF BREATH, DIARRHEA,DIFFUSE BODY ACHES  AND CHANGES IN SMELL OR TASTE WERE NEGATIVE.THE PATIENT DENIED ANY CONTACT WITH PERSONS WHO WERE POSITIVE FOR COVID, AND PATIENT IS NOT IN CATEGORY OF HIGH RISK BEHAVIOR TO ACQUIRE COVID.    DURING THE VISIT WITH THE PATIENT TODAY , PATIENT HAD FACE MASK, MY MEDICAL ASSISTANT AND I  HAD PROPPER PROTECTIVE EQUIPMENT, AND I DID HAND HYGIENE WITH SOAP AND WATER BEFORE AND AFTER THE VISIT.  This patient returns today to the office for follow up in company of her brother who has come from [place] in order to review her status in regard previous history of advanced breast cancer. Overall she continues taking all of her medicines and 100% compliance and she has not had any symptoms that would suggest breast cancer recurrence. Specifically she has not had any new bone pain, no cough, sputum production, shortness of breath, alteration in appetite or fatigue. She has not developed any jaundice, distention in the abdomen or any cardiovascular issues. She continues having upper respiratory allergies and she is handling this on her own as per previous instructions. She continues taking her blood pressure medication, aspirin and vitamin D as well. All of these medicines are provided by me.               Past Medical History:   Diagnosis Date   • Anemia in neoplastic disease    • Arthritis    • Breast cancer (CMS/HCC)     Left    • CVA (cerebral vascular accident) (CMS/HCC)    • Hip pain     RIGHT HIP... CYST   • History of fracture of leg 1987   • History of radiation therapy     LAST TREATMENT     • Hypertension    • Limited joint range of motion (ROM)     RIGHT HIP   • Skin sore     OPEN SORE LEFT BREAST   • Syncope    • Vertigo            Past Surgical History:   Procedure Laterality Date   • AXILLARY LYMPH NODE BIOPSY/EXCISION Right     LYMPH NODE UNDER RIGHT ARM-MALIGNANT (DOUBLE MASTECTOMY)   • BREAST BIOPSY Left     MALIGNANT   • FRACTURE SURGERY  1987    Leg   • HARDWARE REMOVAL     • MASTECTOMY W/ SENTINEL NODE BIOPSY Bilateral 9/16/2019    Procedure: BILATERLA MODIFIED RADICAL MASTECTOMY WITH BILATERAL SENTINEL LYMPH NODE BIOPSY;  Surgeon: Joby Barron Jr., MD;  Location: [facility] OR;  Service: General   • TOTAL HIP ARTHROPLASTY Right 3/2/2020    Procedure: RIGHT TOTAL HIP ARTHROPLASTY NATALY NAVIGATION;  Surgeon: Luis M Leonard MD;  Location: [facility] OR;  Service: Orthopedics;  Laterality: Right;   • VENOUS ACCESS DEVICE (PORT) INSERTION Right 2/1/2019    Procedure: INSERTION VENOUS ACCESS DEVICE;  Surgeon: Joby Barron Jr., MD;  Location: [facility];  Service: General   • VENOUS ACCESS DEVICE (PORT) REMOVAL N/A 10/30/2019    Procedure: Mediport Removal;  Surgeon: Joby Barron Jr., MD;  Location: [facility] OR;  Service: General        Current Outpatient Medications on File Prior to Visit   Medication Sig Dispense Refill   • diclofenac (VOLTAREN) 75 MG EC tablet Take 75 mg by mouth 2 (Two) Times a Day.     • letrozole (FEMARA) 2.5 MG tablet TAKE 1 TABLET BY MOUTH EVERY DAY 90 tablet 2   • meclizine (ANTIVERT) 25 MG tablet Take 1 tablet by mouth 3 (Three) Times a Day As Needed for Dizziness. 90 tablet 0   • metoprolol succinate XL (TOPROL-XL) 25 MG 24 hr tablet TAKE 1 TABLET BY MOUTH EVERY DAY 30 tablet 6   • ondansetron (ZOFRAN) 4 MG tablet Take 1 tablet by mouth Every 8 (Eight) Hours As Needed for  Nausea or Vomiting. 90 tablet 0   • [DISCONTINUED] cephalexin (KEFLEX) 500 MG capsule Take 1 capsule by mouth 4 (Four) Times a Day. Start if pain erythema or fever in right arm, they travel with you 32 capsule 4     No current facility-administered medications on file prior to visit.         ALLERGIES:    Allergies   Allergen Reactions   • Benadryl [Diphenhydramine] Itching and Other (See Comments)     INCREASES BLOOD PRESSURE   • Erythromycin GI Intolerance   • Levaquin [Levofloxacin] GI Intolerance   • Penicillins GI Intolerance        Social History     Socioeconomic History   • Marital status:      Spouse name: Cruz   • Number of children: 0   • Years of education: Not on file   • Highest education level: Not on file   Occupational History   • Occupation:      Employer: SELF-EMPLOYED   Social Needs   • Financial resource strain: Not hard at all   • Food insecurity     Worry: Never true     Inability: Never true   • Transportation needs     Medical: No     Non-medical: No   Tobacco Use   • Smoking status: Never Smoker   • Smokeless tobacco: Never Used   Substance and Sexual Activity   • Alcohol use: Not Currently     Alcohol/week: 7.0 standard drinks     Types: 4 Glasses of wine, 3 Cans of beer per week     Frequency: 2-3 times a week     Drinks per session: 1 or 2     Binge frequency: Never   • Drug use: No   • Sexual activity: Not Currently     Partners: Male     Birth control/protection: Post-menopausal   Lifestyle   • Physical activity     Days per week: 7 days     Minutes per session: 120 min   • Stress: Only a little        Family History   Problem Relation Age of Onset   • Lung cancer Father    • Cancer Mother    • Breast cancer Mother    • Liver disease Mother    • Breast cancer Sister 48   • Breast cancer Maternal Grandmother    • Breast cancer Paternal Grandmother    • Breast cancer Maternal Uncle    • Malig Hyperthermia Neg Hx             Objective     Vitals:    01/21/21 0935  "  BP: 158/91   Pulse: 61   Resp: 20   Temp: 98.2 °F (36.8 °C)   TempSrc: Temporal   SpO2: 99%   Weight: 73.9 kg (162 lb 14.4 oz)   Height: 165.1 cm (65\")   PainSc:   2   PainLoc: Hip     Current Status 1/21/2021   ECOG score 0     Exam: I HAVE PERSONALLY REVIEWED THE HISTORY OF THE PRESENT ILLNESS, PAST MEDICAL HISTORY, FAMILY HISTORY, SOCIAL HISTORY, ALLERGIES, MEDICATIONS STATED ABOVE IN THE OFFICE NOTE FROM TODAY.        GENERAL:  Well-developed, well-nourished  Patient  in no acute distress.   SKIN:  Warm, dry ,NO rashes,NO purpura ,NO petechiae.  HEENT:  Pupils were equal and reactive to light and accomodation, conjunctivae noninjected, no pterygium, normal extraocular movements, normal visual acuity.   NECK:  Supple with good range of motion; no thyromegaly or masses, no JVD or bruits, no cervical adenopathies.No carotid artery pain, no carotid abnormal pulsation , NO arterial dance.  LYMPHATICS:  No cervical, NO supraclavicular, NO axillary,NO epitrochlear , NO inguinal adenopathy.  CARDIAC   normal rate and regular rhythm, without murmur,NO rubs NO S3 NO S4 right or left . Normal femoral, popliteal, pedis, brachial and carotid pulses.  BILATERAL MASTECTOMY NO NODULES OR MASSES IN CHEST WALLS OR AXILLAS  VASCULAR ARTERIAL: normal carotids,brachial,radial,femoral,popliteal, pedis pulses , no bruits.no paleness or cyanosis, no pain, no edema, no numbness, no gangrene.  VASCULAR VENOUS: no cyanosis, collateral circulation, varicosities, edema, palpable cords, pain, erythema.  ABDOMEN:  Soft, nontender with no hepatomegaly, no splenomegaly,no masses, no ascites, no collateral circulation,no distention,no [place] sign, no abdominal pain, no inguinal hernias,no umbilical hernia, no abdominal bruits. Normal bowel sounds.  GENITAL: Not  Performed.  EXTREMITIES  AND SPINE:  No clubbing, cyanosis or edema, OA HANDS deformities NO pain .No kyphosis, scoliosis, deformities or pain in spine, ribs or pelvic " bone.  NEUROLOGICAL:  Patient was awake, alert, oriented to time, person and place.            RECENT LABS:  Hematology WBC   Date Value Ref Range Status   01/21/2021 3.39 (L) 3.40 - 10.80 10*3/mm3 Final     RBC   Date Value Ref Range Status   01/21/2021 4.02 3.77 - 5.28 10*6/mm3 Final     Hemoglobin   Date Value Ref Range Status   01/21/2021 12.0 12.0 - 15.9 g/dL Final     Hematocrit   Date Value Ref Range Status   01/21/2021 39.2 34.0 - 46.6 % Final     Platelets   Date Value Ref Range Status   01/21/2021 208 140 - 450 10*3/mm3 Final              Assessment/Plan    1.  History of least for the last 4 years, she has had an in crescendo mass in the left breast that has produced a gigantic tumor that WAS close to 25 cm in size and is replacing most of the anatomy of the left breast. There are areas of ulceration necrosis and bleeding and the mass is fixed to the chest wall. She has abundant amount of collateral circulation in the left anterior chest wall and it caught my attention the lack of any left axillary adenopathy. She has no lymphedema in the left upper extremity. In the right breast while in sitting position, no palpable abnormalities are documented. The right breast skin and nipple are normal. When the patient lies flat, there is a palpable mass at 9 o'clock from the nipple that measures probably 4 cm in size, very indistinct in regard to edges and margins. There was no mobility and no areas of tenderness. She has a giant adenopathy in the right axilla that measures close to 9 cm in size, uniform, no fluctuation, nontender, completely fixed to the axillary wall. There is no compromise of the skin.     After completion of 4 cycles of AC patient has had very dramatic improvement in all sites of disease including right axilla and left breast.    · Completed 4 cycles Adriamycin Cytoxan on 4/12/2019.    · Patient started weekly Taxol treatments on 5/3/2019.  · Completed 12 weekly Taxol treatments on  7/19/2019.  · 9/16/2019 bilateral mastectomy by Dr. Joby Barron with axillary lymph node dissection.  No residual malignancy.  · 10/30/2019 patient's Mediport was removed in anticipation of radiation.  · Radiation therapy under the care of Dr. Marmolejo 10/31/2019-1/13/2020 to the right chest wall.  · Started on adjuvant Femara 2.5 mg daily 8/20/2019.  · 9/21/2020 continues on adjuvant Femara, tolerating it quite well.  Some mild hot flashes and mild arthralgias, all which are tolerable.  I advised the patient that at this point her breast cancer remains in remission. She is 100% compliant of her medication letrozole and her clinical examination remains negative normal for breast cancer recurrence.     From the point of view of her hypertension this remains under good control even though she admits that she forgot to take her blood pressure medicine this morning. I remind her about the need to do this at this point.     Her white count remains a little bit low related to previous chemotherapy with no implications from my point of view. Her hemoglobin, platelet count and white count differential remains normal.     At this point I find no need for any radiological assessment on her at this time. I will review her back in 3 months with a CBC, CMP and CA 15-3. Obviously there is no need for mammograms.     At some point this year we will proceed with another bone density test.

## 2021-02-09 DIAGNOSIS — C50.812 MALIGNANT NEOPLASM OF OVERLAPPING SITES OF LEFT BREAST IN FEMALE, ESTROGEN RECEPTOR POSITIVE (HCC): Primary | ICD-10-CM

## 2021-02-09 DIAGNOSIS — Z17.0 MALIGNANT NEOPLASM OF OVERLAPPING SITES OF LEFT BREAST IN FEMALE, ESTROGEN RECEPTOR POSITIVE (HCC): Primary | ICD-10-CM

## 2021-02-09 RX ORDER — DICLOFENAC SODIUM 75 MG/1
TABLET, DELAYED RELEASE ORAL
Qty: 60 TABLET | Refills: 2 | OUTPATIENT
Start: 2021-02-09

## 2021-02-09 RX ORDER — DICLOFENAC SODIUM 75 MG/1
75 TABLET, DELAYED RELEASE ORAL 2 TIMES DAILY
Qty: 60 TABLET | Refills: 3 | Status: SHIPPED | OUTPATIENT
Start: 2021-02-09 | End: 2021-06-21

## 2021-03-07 ENCOUNTER — IMMUNIZATION (OUTPATIENT)
Dept: VACCINE CLINIC | Facility: HOSPITAL | Age: 61
End: 2021-03-07

## 2021-03-07 PROCEDURE — 0001A: CPT | Performed by: INTERNAL MEDICINE

## 2021-03-07 PROCEDURE — 91300 HC SARSCOV02 VAC 30MCG/0.3ML IM: CPT | Performed by: INTERNAL MEDICINE

## 2021-03-23 RX ORDER — LETROZOLE 2.5 MG/1
2.5 TABLET, FILM COATED ORAL DAILY
Qty: 90 TABLET | Refills: 2 | Status: SHIPPED | OUTPATIENT
Start: 2021-03-23 | End: 2021-09-20 | Stop reason: SDUPTHER

## 2021-03-23 NOTE — TELEPHONE ENCOUNTER
Caller: RHONDA     Relationship: SELF     Best call back number: [phone]    Medication needed:   Requested Prescriptions     Pending Prescriptions Disp Refills   • letrozole (FEMARA) 2.5 MG tablet 90 tablet 2     Sig: Take 1 tablet by mouth Daily.         Does the patient have less than a 3 day supply:  [] Yes  [x] No    What is the patient's preferred pharmacy:        [facility] [phone]

## 2021-03-28 ENCOUNTER — IMMUNIZATION (OUTPATIENT)
Dept: VACCINE CLINIC | Facility: HOSPITAL | Age: 61
End: 2021-03-28

## 2021-03-28 PROCEDURE — 91300 HC SARSCOV02 VAC 30MCG/0.3ML IM: CPT | Performed by: INTERNAL MEDICINE

## 2021-03-28 PROCEDURE — 0002A: CPT | Performed by: INTERNAL MEDICINE

## 2021-04-16 ENCOUNTER — LAB (OUTPATIENT)
Dept: LAB | Facility: HOSPITAL | Age: 61
End: 2021-04-16

## 2021-04-16 ENCOUNTER — OFFICE VISIT (OUTPATIENT)
Dept: ONCOLOGY | Facility: CLINIC | Age: 61
End: 2021-04-16

## 2021-04-16 VITALS
RESPIRATION RATE: 19 BRPM | OXYGEN SATURATION: 99 % | DIASTOLIC BLOOD PRESSURE: 80 MMHG | HEART RATE: 60 BPM | HEIGHT: 65 IN | TEMPERATURE: 97.5 F | BODY MASS INDEX: 26.92 KG/M2 | WEIGHT: 161.6 LBS | SYSTOLIC BLOOD PRESSURE: 120 MMHG

## 2021-04-16 DIAGNOSIS — C50.812 MALIGNANT NEOPLASM OF OVERLAPPING SITES OF BOTH BREASTS IN FEMALE, ESTROGEN RECEPTOR POSITIVE (HCC): ICD-10-CM

## 2021-04-16 DIAGNOSIS — Z80.3 FAMILY HISTORY OF BREAST CANCER IN FEMALE: ICD-10-CM

## 2021-04-16 DIAGNOSIS — C50.812 MALIGNANT NEOPLASM OF OVERLAPPING SITES OF LEFT BREAST IN FEMALE, ESTROGEN RECEPTOR POSITIVE (HCC): ICD-10-CM

## 2021-04-16 DIAGNOSIS — C50.811 MALIGNANT NEOPLASM OF OVERLAPPING SITES OF BOTH BREASTS IN FEMALE, ESTROGEN RECEPTOR POSITIVE (HCC): ICD-10-CM

## 2021-04-16 DIAGNOSIS — Z17.0 MALIGNANT NEOPLASM OF OVERLAPPING SITES OF BOTH BREASTS IN FEMALE, ESTROGEN RECEPTOR POSITIVE (HCC): ICD-10-CM

## 2021-04-16 DIAGNOSIS — Z17.0 MALIGNANT NEOPLASM OF OVERLAPPING SITES OF LEFT BREAST IN FEMALE, ESTROGEN RECEPTOR POSITIVE (HCC): ICD-10-CM

## 2021-04-16 DIAGNOSIS — C50.812 MALIGNANT NEOPLASM OF OVERLAPPING SITES OF LEFT BREAST IN FEMALE, ESTROGEN RECEPTOR POSITIVE (HCC): Primary | ICD-10-CM

## 2021-04-16 DIAGNOSIS — I10 ESSENTIAL HYPERTENSION: ICD-10-CM

## 2021-04-16 DIAGNOSIS — Z17.0 MALIGNANT NEOPLASM OF OVERLAPPING SITES OF LEFT BREAST IN FEMALE, ESTROGEN RECEPTOR POSITIVE (HCC): Primary | ICD-10-CM

## 2021-04-16 LAB
ALBUMIN SERPL-MCNC: 4.7 G/DL (ref 3.5–5.2)
ALBUMIN/GLOB SERPL: 2 G/DL (ref 1.1–2.4)
ALP SERPL-CCNC: 77 U/L (ref 38–116)
ALT SERPL W P-5'-P-CCNC: 19 U/L (ref 0–33)
ANION GAP SERPL CALCULATED.3IONS-SCNC: 11.6 MMOL/L (ref 5–15)
AST SERPL-CCNC: 23 U/L (ref 0–32)
BASOPHILS # BLD AUTO: 0.04 10*3/MM3 (ref 0–0.2)
BASOPHILS NFR BLD AUTO: 0.9 % (ref 0–1.5)
BILIRUB SERPL-MCNC: 0.5 MG/DL (ref 0.2–1.2)
BUN SERPL-MCNC: 21 MG/DL (ref 6–20)
BUN/CREAT SERPL: 25 (ref 7.3–30)
CALCIUM SPEC-SCNC: 9.9 MG/DL (ref 8.5–10.2)
CANCER AG15-3 SERPL-ACNC: 15.9 U/ML
CHLORIDE SERPL-SCNC: 103 MMOL/L (ref 98–107)
CO2 SERPL-SCNC: 24.4 MMOL/L (ref 22–29)
CREAT SERPL-MCNC: 0.84 MG/DL (ref 0.6–1.1)
DEPRECATED RDW RBC AUTO: 51.7 FL (ref 37–54)
EOSINOPHIL # BLD AUTO: 0.03 10*3/MM3 (ref 0–0.4)
EOSINOPHIL NFR BLD AUTO: 0.7 % (ref 0.3–6.2)
ERYTHROCYTE [DISTWIDTH] IN BLOOD BY AUTOMATED COUNT: 14.4 % (ref 12.3–15.4)
GFR SERPL CREATININE-BSD FRML MDRD: 68 ML/MIN/1.73
GLOBULIN UR ELPH-MCNC: 2.4 GM/DL (ref 1.8–3.5)
GLUCOSE SERPL-MCNC: 129 MG/DL (ref 74–124)
HCT VFR BLD AUTO: 38.5 % (ref 34–46.6)
HGB BLD-MCNC: 12.2 G/DL (ref 12–15.9)
IMM GRANULOCYTES # BLD AUTO: 0.01 10*3/MM3 (ref 0–0.05)
IMM GRANULOCYTES NFR BLD AUTO: 0.2 % (ref 0–0.5)
LYMPHOCYTES # BLD AUTO: 0.87 10*3/MM3 (ref 0.7–3.1)
LYMPHOCYTES NFR BLD AUTO: 19.3 % (ref 19.6–45.3)
MCH RBC QN AUTO: 30.8 PG (ref 26.6–33)
MCHC RBC AUTO-ENTMCNC: 31.7 G/DL (ref 31.5–35.7)
MCV RBC AUTO: 97.2 FL (ref 79–97)
MONOCYTES # BLD AUTO: 0.28 10*3/MM3 (ref 0.1–0.9)
MONOCYTES NFR BLD AUTO: 6.2 % (ref 5–12)
NEUTROPHILS NFR BLD AUTO: 3.27 10*3/MM3 (ref 1.7–7)
NEUTROPHILS NFR BLD AUTO: 72.7 % (ref 42.7–76)
NRBC BLD AUTO-RTO: 0 /100 WBC (ref 0–0.2)
PLATELET # BLD AUTO: 189 10*3/MM3 (ref 140–450)
PMV BLD AUTO: 8.2 FL (ref 6–12)
POTASSIUM SERPL-SCNC: 4.5 MMOL/L (ref 3.5–4.7)
PROT SERPL-MCNC: 7.1 G/DL (ref 6.3–8)
RBC # BLD AUTO: 3.96 10*6/MM3 (ref 3.77–5.28)
SODIUM SERPL-SCNC: 139 MMOL/L (ref 134–145)
WBC # BLD AUTO: 4.5 10*3/MM3 (ref 3.4–10.8)

## 2021-04-16 PROCEDURE — 86300 IMMUNOASSAY TUMOR CA 15-3: CPT | Performed by: INTERNAL MEDICINE

## 2021-04-16 PROCEDURE — 36415 COLL VENOUS BLD VENIPUNCTURE: CPT

## 2021-04-16 PROCEDURE — 80053 COMPREHEN METABOLIC PANEL: CPT

## 2021-04-16 PROCEDURE — 85025 COMPLETE CBC W/AUTO DIFF WBC: CPT

## 2021-04-16 PROCEDURE — 99214 OFFICE O/P EST MOD 30 MIN: CPT | Performed by: INTERNAL MEDICINE

## 2021-04-16 NOTE — PROGRESS NOTES
Subjective     REASON FOR FOLLOW UP:  1. 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 R BREAST MASS WITH GIANT R AXILLARY ADENOPATHY.  3. FAMILY HISTORY OF BREAST CANCER IN MOTHER AND SISTER, SISTER WAS TESTED FOR BRCA AND WAS NEGATIVE.                          History of Present Illness       DURING THE VISIT WITH THE PATIENT TODAY , PATIENT HAD FACE MASK, MY MEDICAL ASSISTANT AND I  HAD PROPPER PROTECTIVE EQUIPMENT, AND I DID HAND HYGIENE WITH SOAP AND WATER BEFORE AND AFTER THE VISIT.    This patient returns today to the office for follow up. She is here today in the company of her brother who has come all the way from [place] as usual for her regular clinic visits. Overall the patient feels very well but the only new problem is pain in the left hip associated with degenerative arthritis that is starting to bother her physical activity at [facility]. Other than that she has no other site of bone pain. Her appetite and weight are stable. Her bowel function and urination are normal. Her chest wall from the site of mastectomies have not had any issues. She is 100% compliant with her Femara and no side effect of this medicine. The patient has not had any cough, sputum production, shortness of breath. She continues taking her aspirin and her blood pressure medicine that I provide. She has not had any other episodes of vertigo.             Past Medical History:   Diagnosis Date   • Anemia in neoplastic disease    • Arthritis    • Breast cancer (CMS/HCC)     Left   • CVA (cerebral vascular accident) (CMS/HCC)    • Hip pain     RIGHT HIP... CYST   • History of fracture of leg 1987   • History of radiation therapy     LAST TREATMENT     • Hypertension    • Limited joint range of motion (ROM)     RIGHT HIP   • Skin sore     OPEN SORE LEFT BREAST   • Syncope    • Vertigo            Past Surgical History:   Procedure Laterality Date   • AXILLARY LYMPH NODE BIOPSY/EXCISION Right      LYMPH NODE UNDER RIGHT ARM-MALIGNANT (DOUBLE MASTECTOMY)   • BREAST BIOPSY Left     MALIGNANT   • FRACTURE SURGERY  1987    Leg   • HARDWARE REMOVAL     • MASTECTOMY W/ SENTINEL NODE BIOPSY Bilateral 9/16/2019    Procedure: BILATERLA MODIFIED RADICAL MASTECTOMY WITH BILATERAL SENTINEL LYMPH NODE BIOPSY;  Surgeon: Joby Barron Jr., MD;  Location: [facility] OR;  Service: General   • TOTAL HIP ARTHROPLASTY Right 3/2/2020    Procedure: RIGHT TOTAL HIP ARTHROPLASTY NATALY NAVIGATION;  Surgeon: Luis M Leonard MD;  Location: [facility] OR;  Service: Orthopedics;  Laterality: Right;   • VENOUS ACCESS DEVICE (PORT) INSERTION Right 2/1/2019    Procedure: INSERTION VENOUS ACCESS DEVICE;  Surgeon: Joby Barron Jr., MD;  Location: [facility];  Service: General   • VENOUS ACCESS DEVICE (PORT) REMOVAL N/A 10/30/2019    Procedure: Mediport Removal;  Surgeon: Joby Barron Jr., MD;  Location: [facility] OR;  Service: General        Current Outpatient Medications on File Prior to Visit   Medication Sig Dispense Refill   • diclofenac (VOLTAREN) 75 MG EC tablet Take 1 tablet by mouth 2 (Two) Times a Day. 60 tablet 3   • letrozole (FEMARA) 2.5 MG tablet Take 1 tablet by mouth Daily. 90 tablet 2   • meclizine (ANTIVERT) 25 MG tablet Take 1 tablet by mouth 3 (Three) Times a Day As Needed for Dizziness. 90 tablet 0   • metoprolol succinate XL (TOPROL-XL) 25 MG 24 hr tablet TAKE 1 TABLET BY MOUTH EVERY DAY 30 tablet 6   • ondansetron (ZOFRAN) 4 MG tablet Take 1 tablet by mouth Every 8 (Eight) Hours As Needed for Nausea or Vomiting. 90 tablet 0     No current facility-administered medications on file prior to visit.        ALLERGIES:    Allergies   Allergen Reactions   • Benadryl [Diphenhydramine] Itching and Other (See Comments)     INCREASES BLOOD PRESSURE   • Erythromycin GI Intolerance   • Levaquin [Levofloxacin] GI Intolerance   • Penicillins GI Intolerance        Social History     Socioeconomic History   • Marital  "status:      Spouse name: Cruz   • Number of children: 0   • Years of education: Not on file   • Highest education level: Not on file   Tobacco Use   • Smoking status: Never Smoker   • Smokeless tobacco: Never Used   Substance and Sexual Activity   • Alcohol use: Not Currently     Alcohol/week: 7.0 standard drinks     Types: 4 Glasses of wine, 3 Cans of beer per week   • Drug use: No   • Sexual activity: Not Currently     Partners: Male     Birth control/protection: Post-menopausal        Family History   Problem Relation Age of Onset   • Lung cancer Father    • Cancer Mother    • Breast cancer Mother    • Liver disease Mother    • Breast cancer Sister 48   • Breast cancer Maternal Grandmother    • Breast cancer Paternal Grandmother    • Breast cancer Maternal Uncle    • Malig Hyperthermia Neg Hx             Objective     Vitals:    04/16/21 1034   BP: 120/80   Pulse: 60   Resp: 19   Temp: 97.5 °F (36.4 °C)   TempSrc: Temporal   SpO2: 99%   Weight: 73.3 kg (161 lb 9.6 oz)   Height: 165.1 cm (65\")   PainSc:   3   PainLoc: Hip     Current Status 4/16/2021   ECOG score 0     Exam:   I HAVE PERSONALLY REVIEWED THE HISTORY OF THE PRESENT ILLNESS, PAST MEDICAL HISTORY, FAMILY HISTORY, SOCIAL HISTORY, ALLERGIES, MEDICATIONS STATED ABOVE IN THE OFFICE NOTE FROM TODAY.        GENERAL:  Well-developed, well-nourished  Patient  in no acute distress.   SKIN:  Warm, dry ,NO rashes,NO purpura ,NO petechiae.  HEENT:  Pupils were equal and reactive to light and accomodation, conjunctivae noninjected, no pterygium, normal extraocular movements, normal visual acuity.   NECK:  Supple with good range of motion; no thyromegaly or masses, no JVD or bruits, no cervical adenopathies.No carotid artery pain, no carotid abnormal pulsation , NO arterial dance.  LYMPHATICS:  No cervical, NO supraclavicular, NO axillary,NO epitrochlear , NO inguinal adenopathy.  CARDIAC   normal rate and regular rhythm, without murmur,NO rubs NO S3 NO " S4 right or left . Normal femoral, popliteal, pedis, brachial and carotid pulses.BILATERAL MASTECTOMIES, NO CHEST WALL NODULES OR LESIONS NO AXILLARY ADENOPATHIES NO LYMPHEDEMA  VASCULAR ARTERIAL: normal carotids,brachial,radial,femoral,popliteal, pedis pulses , no bruits.no paleness or cyanosis, no pain, no edema, no numbness, no gangrene.  VASCULAR VENOUS: no cyanosis, collateral circulation, varicosities, edema, palpable cords, pain, erythema.  ABDOMEN:  Soft, nontender with no hepatomegaly, no splenomegaly,no masses, no ascites, no collateral circulation,no distention,no [place] sign, no abdominal pain, no inguinal hernias,no umbilical hernia, no abdominal bruits. Normal bowel sounds.  GENITAL: Not  Performed.  EXTREMITIES  AND SPINE:  No clubbing, cyanosis or edema, no deformities LEFT HIP pain .No kyphosis, scoliosis, deformities or pain in spine, ribs or pelvic bone.  NEUROLOGICAL:  Patient was awake, alert, oriented to time, person and place.            RECENT LABS:  Hematology WBC   Date Value Ref Range Status   04/16/2021 4.50 3.40 - 10.80 10*3/mm3 Final     RBC   Date Value Ref Range Status   04/16/2021 3.96 3.77 - 5.28 10*6/mm3 Final     Hemoglobin   Date Value Ref Range Status   04/16/2021 12.2 12.0 - 15.9 g/dL Final     Hematocrit   Date Value Ref Range Status   04/16/2021 38.5 34.0 - 46.6 % Final     Platelets   Date Value Ref Range Status   04/16/2021 189 140 - 450 10*3/mm3 Final        Component      Latest Ref Rng & Units 1/22/2019 4/12/2019 6/21/2019 8/14/2019   CA 15-3      <=25.0 U/mL 44.1 (H) 33.3 (H) 17.0 17.3     Component      Latest Ref Rng & Units 12/17/2019 6/29/2020 9/21/2020 1/21/2021   CA 15-3      <=25.0 U/mL 12.8 11.1 11.9 14.2         Assessment/Plan    1.  History of least for the last 4 years, she has had an in crescendo mass in the left breast that has produced a gigantic tumor that WAS close to 25 cm in size and is replacing most of the anatomy of the left breast. There are areas  of ulceration necrosis and bleeding and the mass is fixed to the chest wall. She has abundant amount of collateral circulation in the left anterior chest wall and it caught my attention the lack of any left axillary adenopathy. She has no lymphedema in the left upper extremity. In the right breast while in sitting position, no palpable abnormalities are documented. The right breast skin and nipple are normal. When the patient lies flat, there is a palpable mass at 9 o'clock from the nipple that measures probably 4 cm in size, very indistinct in regard to edges and margins. There was no mobility and no areas of tenderness. She has a giant adenopathy in the right axilla that measures close to 9 cm in size, uniform, no fluctuation, nontender, completely fixed to the axillary wall. There is no compromise of the skin.     After completion of 4 cycles of AC patient has had very dramatic improvement in all sites of disease including right axilla and left breast.    · Completed 4 cycles Adriamycin Cytoxan on 4/12/2019.    · Patient started weekly Taxol treatments on 5/3/2019.  · Completed 12 weekly Taxol treatments on 7/19/2019.  · 9/16/2019 bilateral mastectomy by Dr. Joby Barron with axillary lymph node dissection.  No residual malignancy.  · 10/30/2019 patient's Mediport was removed in anticipation of radiation.  · Radiation therapy under the care of Dr. Marmolejo 10/31/2019-1/13/2020 to the right chest wall.  · Started on adjuvant Femara 2.5 mg daily 8/20/2019.  · 9/21/2020 continues on adjuvant Femara, tolerating it quite well.  Some mild hot flashes and mild arthralgias, all which are tolerable.  I advised the patient that at this point her breast cancer remains in remission. She is 100% compliant of her medication letrozole and her clinical examination remains negative normal for breast cancer recurrence.   The patient was further reviewed on 04/16/2021. Her clinical examination is completely negative normal and her  questioning is negative in regard to symptoms that would suggest breast cancer recurrence. She is 100% compliant with her Femara. Her white count, hemoglobin and platelets remain normal. Her tumor marker during the previous visit was normal and we have another one pending today CA 15-3.     I mentioned to the patient and her brother the fact that Verzenio has been approved in the adjuvant therapy with high risk breast cancer. I mentioned to her side effects of the medicine including the economic toxicity, nausea, vomiting, diarrhea, liver dysfunction, leukopenia, thrombocytopenia among others. She is not too much keen to listen to the cost of the medicine and the side effect profile because she believes that this could modify in a negative way her quality of life. She is going to think about it and proper information was given to her in this regard.     In regard to her hypertension her blood pressure is under good control. I have rechecked her blood pressure in the left upper extremity, it is 110/80 and I think she will remain on her metoprolol for the time being.     In regard to her joint pain left hip is starting to give her trouble and eventually she will see her orthopedic surgeon. She is eventually planning at some point maybe this year to have a left hip replacement.     From the point of view of her right hip replacement she is doing terrific and she is not having any issues or difficulties.     RECOMMENDATIONS: She will remain in observation from my point of view. If she wants to pursue therapy with Verzenio she will let us know. I doubt that she will agree with this. Otherwise the patient will return to see me in 3 months with a CBC, CMP and a CA 15-3. We will call her at home with the measurement of her tumor marker. Obviously with bilateral mastectomies there is no need for mammograms.     Given the paucity of symptoms and normal physical exam I find no need for radiological assessment at this time. She  is still taking Voltaren that can raise her blood pressure and she says that she cannot live without it. She is aware of the potential side effects of this medicine but she is not willing to stop it.     She has no need for refills on her medicines today. She will call whenever she is ready for them including her Femara and metoprolol. She remains on an aspirin.

## 2021-04-21 ENCOUNTER — TELEPHONE (OUTPATIENT)
Dept: ONCOLOGY | Facility: CLINIC | Age: 61
End: 2021-04-21

## 2021-04-21 DIAGNOSIS — Z17.0 MALIGNANT NEOPLASM OF OVERLAPPING SITES OF LEFT BREAST IN FEMALE, ESTROGEN RECEPTOR POSITIVE (HCC): Primary | ICD-10-CM

## 2021-04-21 DIAGNOSIS — C50.812 MALIGNANT NEOPLASM OF OVERLAPPING SITES OF LEFT BREAST IN FEMALE, ESTROGEN RECEPTOR POSITIVE (HCC): Primary | ICD-10-CM

## 2021-04-21 NOTE — TELEPHONE ENCOUNTER
Antigen 15-3  Order: [id number]  Status:  Final result   Visible to patient:  Yes (Evelyne) Dx:  Family history of breast cancer in fe...  Specimen Information: Blood        Component   Ref Range & Units 5 d ago 3 mo ago 7 mo ago   CA 15-3   <=25.0 U/mL 15.9  14.2  11.9            I have called this patient and left a message on her voice mail that we need to proceed with a CT scan of the chest, abdomen and pelvis and a bone scan and review her back in 3-4 weeks in the office along with a CBC, CMP and a CA 15-3. The slow trend in regard to the CA 15-3 elevation suggests to me early recurrent breast cancer in this patient who has a high risk of recurrence in the first place. I asked her to call if she needs to have any other directions or instructions.

## 2021-05-10 ENCOUNTER — HOSPITAL ENCOUNTER (OUTPATIENT)
Dept: NUCLEAR MEDICINE | Facility: HOSPITAL | Age: 61
Discharge: HOME | End: 2021-05-10

## 2021-05-10 ENCOUNTER — HOSPITAL ENCOUNTER (OUTPATIENT)
Dept: CT IMAGING | Facility: HOSPITAL | Age: 61
Discharge: HOME | End: 2021-05-10
Admitting: INTERNAL MEDICINE

## 2021-05-10 DIAGNOSIS — C50.812 MALIGNANT NEOPLASM OF OVERLAPPING SITES OF LEFT BREAST IN FEMALE, ESTROGEN RECEPTOR POSITIVE (HCC): ICD-10-CM

## 2021-05-10 DIAGNOSIS — Z17.0 MALIGNANT NEOPLASM OF OVERLAPPING SITES OF LEFT BREAST IN FEMALE, ESTROGEN RECEPTOR POSITIVE (HCC): ICD-10-CM

## 2021-05-10 LAB — CREAT BLDA-MCNC: 0.8 MG/DL (ref 0.6–1.3)

## 2021-05-10 PROCEDURE — A9503 TC99M MEDRONATE: HCPCS | Performed by: INTERNAL MEDICINE

## 2021-05-10 PROCEDURE — 74177 CT ABD & PELVIS W/CONTRAST: CPT

## 2021-05-10 PROCEDURE — 82565 ASSAY OF CREATININE: CPT

## 2021-05-10 PROCEDURE — 0 TECHNETIUM MEDRONATE KIT: Performed by: INTERNAL MEDICINE

## 2021-05-10 PROCEDURE — 71260 CT THORAX DX C+: CPT

## 2021-05-10 PROCEDURE — 0 DIATRIZOATE MEGLUMINE & SODIUM PER 1 ML: Performed by: INTERNAL MEDICINE

## 2021-05-10 PROCEDURE — 78306 BONE IMAGING WHOLE BODY: CPT

## 2021-05-10 PROCEDURE — 25010000002 IOPAMIDOL 61 % SOLUTION: Performed by: INTERNAL MEDICINE

## 2021-05-10 RX ORDER — TC 99M MEDRONATE 20 MG/10ML
23.2 INJECTION, POWDER, LYOPHILIZED, FOR SOLUTION INTRAVENOUS
Status: COMPLETED | OUTPATIENT
Start: 2021-05-10 | End: 2021-05-10

## 2021-05-10 RX ADMIN — Medication 23.2 MILLICURIE: at 10:17

## 2021-05-10 RX ADMIN — IOPAMIDOL 100 ML: 612 INJECTION, SOLUTION INTRAVENOUS at 11:02

## 2021-05-10 RX ADMIN — DIATRIZOATE MEGLUMINE AND DIATRIZOATE SODIUM 30 ML: 600; 100 SOLUTION ORAL; RECTAL at 10:00

## 2021-05-17 ENCOUNTER — LAB (OUTPATIENT)
Dept: LAB | Facility: HOSPITAL | Age: 61
End: 2021-05-17

## 2021-05-17 ENCOUNTER — OFFICE VISIT (OUTPATIENT)
Dept: ONCOLOGY | Facility: CLINIC | Age: 61
End: 2021-05-17

## 2021-05-17 VITALS
HEART RATE: 58 BPM | TEMPERATURE: 97.8 F | WEIGHT: 163.4 LBS | RESPIRATION RATE: 20 BRPM | BODY MASS INDEX: 27.22 KG/M2 | HEIGHT: 65 IN | SYSTOLIC BLOOD PRESSURE: 147 MMHG | OXYGEN SATURATION: 98 % | DIASTOLIC BLOOD PRESSURE: 80 MMHG

## 2021-05-17 DIAGNOSIS — Z17.0 MALIGNANT NEOPLASM OF OVERLAPPING SITES OF LEFT BREAST IN FEMALE, ESTROGEN RECEPTOR POSITIVE (HCC): Primary | ICD-10-CM

## 2021-05-17 DIAGNOSIS — Z17.0 MALIGNANT NEOPLASM OF OVERLAPPING SITES OF BOTH BREASTS IN FEMALE, ESTROGEN RECEPTOR POSITIVE (HCC): ICD-10-CM

## 2021-05-17 DIAGNOSIS — N83.202 CYST OF LEFT OVARY: ICD-10-CM

## 2021-05-17 DIAGNOSIS — C50.812 MALIGNANT NEOPLASM OF OVERLAPPING SITES OF LEFT BREAST IN FEMALE, ESTROGEN RECEPTOR POSITIVE (HCC): Primary | ICD-10-CM

## 2021-05-17 DIAGNOSIS — C50.812 MALIGNANT NEOPLASM OF OVERLAPPING SITES OF BOTH BREASTS IN FEMALE, ESTROGEN RECEPTOR POSITIVE (HCC): ICD-10-CM

## 2021-05-17 DIAGNOSIS — C50.811 MALIGNANT NEOPLASM OF OVERLAPPING SITES OF BOTH BREASTS IN FEMALE, ESTROGEN RECEPTOR POSITIVE (HCC): Primary | ICD-10-CM

## 2021-05-17 DIAGNOSIS — C50.812 MALIGNANT NEOPLASM OF OVERLAPPING SITES OF BOTH BREASTS IN FEMALE, ESTROGEN RECEPTOR POSITIVE (HCC): Primary | ICD-10-CM

## 2021-05-17 DIAGNOSIS — Z17.0 MALIGNANT NEOPLASM OF OVERLAPPING SITES OF BOTH BREASTS IN FEMALE, ESTROGEN RECEPTOR POSITIVE (HCC): Primary | ICD-10-CM

## 2021-05-17 DIAGNOSIS — C50.811 MALIGNANT NEOPLASM OF OVERLAPPING SITES OF BOTH BREASTS IN FEMALE, ESTROGEN RECEPTOR POSITIVE (HCC): ICD-10-CM

## 2021-05-17 LAB
ALBUMIN SERPL-MCNC: 4.5 G/DL (ref 3.5–5.2)
ALBUMIN/GLOB SERPL: 2 G/DL (ref 1.1–2.4)
ALP SERPL-CCNC: 83 U/L (ref 38–116)
ALT SERPL W P-5'-P-CCNC: 20 U/L (ref 0–33)
ANION GAP SERPL CALCULATED.3IONS-SCNC: 11 MMOL/L (ref 5–15)
AST SERPL-CCNC: 24 U/L (ref 0–32)
BASOPHILS # BLD AUTO: 0.04 10*3/MM3 (ref 0–0.2)
BASOPHILS NFR BLD AUTO: 0.8 % (ref 0–1.5)
BILIRUB SERPL-MCNC: 0.3 MG/DL (ref 0.2–1.2)
BUN SERPL-MCNC: 27 MG/DL (ref 6–20)
BUN/CREAT SERPL: 25.7 (ref 7.3–30)
CALCIUM SPEC-SCNC: 9.4 MG/DL (ref 8.5–10.2)
CANCER AG125 SERPL QL: 5.5 U/ML (ref 0–38.1)
CHLORIDE SERPL-SCNC: 105 MMOL/L (ref 98–107)
CO2 SERPL-SCNC: 24 MMOL/L (ref 22–29)
CREAT SERPL-MCNC: 1.05 MG/DL (ref 0.6–1.1)
DEPRECATED RDW RBC AUTO: 51.4 FL (ref 37–54)
EOSINOPHIL # BLD AUTO: 0.15 10*3/MM3 (ref 0–0.4)
EOSINOPHIL NFR BLD AUTO: 3.1 % (ref 0.3–6.2)
ERYTHROCYTE [DISTWIDTH] IN BLOOD BY AUTOMATED COUNT: 14.3 % (ref 12.3–15.4)
GFR SERPL CREATININE-BSD FRML MDRD: 53 ML/MIN/1.73
GLOBULIN UR ELPH-MCNC: 2.2 GM/DL (ref 1.8–3.5)
GLUCOSE SERPL-MCNC: 95 MG/DL (ref 74–124)
HCT VFR BLD AUTO: 38 % (ref 34–46.6)
HGB BLD-MCNC: 12.1 G/DL (ref 12–15.9)
IMM GRANULOCYTES # BLD AUTO: 0.01 10*3/MM3 (ref 0–0.05)
IMM GRANULOCYTES NFR BLD AUTO: 0.2 % (ref 0–0.5)
LYMPHOCYTES # BLD AUTO: 0.83 10*3/MM3 (ref 0.7–3.1)
LYMPHOCYTES NFR BLD AUTO: 17.1 % (ref 19.6–45.3)
MCH RBC QN AUTO: 31.3 PG (ref 26.6–33)
MCHC RBC AUTO-ENTMCNC: 31.8 G/DL (ref 31.5–35.7)
MCV RBC AUTO: 98.4 FL (ref 79–97)
MONOCYTES # BLD AUTO: 0.33 10*3/MM3 (ref 0.1–0.9)
MONOCYTES NFR BLD AUTO: 6.8 % (ref 5–12)
NEUTROPHILS NFR BLD AUTO: 3.49 10*3/MM3 (ref 1.7–7)
NEUTROPHILS NFR BLD AUTO: 72 % (ref 42.7–76)
NRBC BLD AUTO-RTO: 0 /100 WBC (ref 0–0.2)
PLATELET # BLD AUTO: 207 10*3/MM3 (ref 140–450)
PMV BLD AUTO: 8.6 FL (ref 6–12)
POTASSIUM SERPL-SCNC: 4.6 MMOL/L (ref 3.5–4.7)
PROT SERPL-MCNC: 6.7 G/DL (ref 6.3–8)
RBC # BLD AUTO: 3.86 10*6/MM3 (ref 3.77–5.28)
SODIUM SERPL-SCNC: 140 MMOL/L (ref 134–145)
WBC # BLD AUTO: 4.85 10*3/MM3 (ref 3.4–10.8)

## 2021-05-17 PROCEDURE — 36415 COLL VENOUS BLD VENIPUNCTURE: CPT

## 2021-05-17 PROCEDURE — 86304 IMMUNOASSAY TUMOR CA 125: CPT | Performed by: INTERNAL MEDICINE

## 2021-05-17 PROCEDURE — 80053 COMPREHEN METABOLIC PANEL: CPT

## 2021-05-17 PROCEDURE — 85025 COMPLETE CBC W/AUTO DIFF WBC: CPT

## 2021-05-17 PROCEDURE — 99214 OFFICE O/P EST MOD 30 MIN: CPT | Performed by: INTERNAL MEDICINE

## 2021-05-17 NOTE — PROGRESS NOTES
Subjective     REASON FOR FOLLOW UP:  1. 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 R BREAST MASS WITH GIANT R AXILLARY ADENOPATHY.  3. FAMILY HISTORY OF BREAST CANCER IN MOTHER AND SISTER, SISTER WAS TESTED FOR BRCA AND WAS NEGATIVE.  4. LEFT OVARIAN CYST , IT HAS BEEN PRESENT FOR LONG TIME BUT IS GETTING LARGER, ASYMPTOMATIC, NEED FOR , PELVIC US AND GYN ONC EVEAL                 5. LEFT HIP PAIN SEVERE ARTHRITIS, REAL NEED FOR LEFT HIP REPLACEMENT.    History of Present Illness       DURING THE VISIT WITH THE PATIENT TODAY , PATIENT HAD FACE MASK, MY MEDICAL ASSISTANT AND I  HAD PROPPER PROTECTIVE EQUIPMENT, AND I DID HAND HYGIENE WITH SOAP AND WATER BEFORE AND AFTER THE VISIT.    This patient returns today to the office for followup in company of her brother. Since the previous visit a few weeks ago, we documented that her tumor marker, CA 15-3 has minimally risen and for that reason I decided to go ahead and proceed with a CT scan of the chest, abdomen and pelvis and a bone scan to be sure that her previous high risk breast cancer was not going to be recurrent. She is here to review this data. In the meantime she has had further deterioration of the pain and discomfort in the left hip and now she is limping. She is barely able to get around walking and when she rides a horse this is the only time off gravity that she feels no discomfort. Other than that, she has a good appetite, normal bowel activity, normal urination. No skeletal pain at any other site. No cardiovascular or respiratory issues. She continues taking her blood pressure medicine and her aspirin.    Neurologically she remains intact, a talker, and carries on her activities of daily living with no difficulties.              Past Medical History:   Diagnosis Date   • Anemia in neoplastic disease    • Arthritis    • Breast cancer (CMS/HCC)     Left   • CVA (cerebral vascular accident) (CMS/HCC)    • Hip pain     RIGHT  HIP... CYST   • History of fracture of leg 1987   • History of radiation therapy     LAST TREATMENT     • Hypertension    • Limited joint range of motion (ROM)     RIGHT HIP   • Skin sore     OPEN SORE LEFT BREAST   • Syncope    • Vertigo            Past Surgical History:   Procedure Laterality Date   • AXILLARY LYMPH NODE BIOPSY/EXCISION Right     LYMPH NODE UNDER RIGHT ARM-MALIGNANT (DOUBLE MASTECTOMY)   • BREAST BIOPSY Left     MALIGNANT   • FRACTURE SURGERY  1987    Leg   • HARDWARE REMOVAL     • MASTECTOMY W/ SENTINEL NODE BIOPSY Bilateral 9/16/2019    Procedure: BILATERLA MODIFIED RADICAL MASTECTOMY WITH BILATERAL SENTINEL LYMPH NODE BIOPSY;  Surgeon: Joby Barron Jr., MD;  Location: [facility] OR;  Service: General   • TOTAL HIP ARTHROPLASTY Right 3/2/2020    Procedure: RIGHT TOTAL HIP ARTHROPLASTY NATALY NAVIGATION;  Surgeon: Luis M Leonard MD;  Location: [facility] OR;  Service: Orthopedics;  Laterality: Right;   • VENOUS ACCESS DEVICE (PORT) INSERTION Right 2/1/2019    Procedure: INSERTION VENOUS ACCESS DEVICE;  Surgeon: Joby Barron Jr., MD;  Location: [facility];  Service: General   • VENOUS ACCESS DEVICE (PORT) REMOVAL N/A 10/30/2019    Procedure: Mediport Removal;  Surgeon: Joby Barron Jr., MD;  Location: [facility] OR;  Service: General        Current Outpatient Medications on File Prior to Visit   Medication Sig Dispense Refill   • diclofenac (VOLTAREN) 75 MG EC tablet Take 1 tablet by mouth 2 (Two) Times a Day. 60 tablet 3   • letrozole (FEMARA) 2.5 MG tablet Take 1 tablet by mouth Daily. 90 tablet 2   • meclizine (ANTIVERT) 25 MG tablet Take 1 tablet by mouth 3 (Three) Times a Day As Needed for Dizziness. 90 tablet 0   • metoprolol succinate XL (TOPROL-XL) 25 MG 24 hr tablet TAKE 1 TABLET BY MOUTH EVERY DAY 30 tablet 6   • ondansetron (ZOFRAN) 4 MG tablet Take 1 tablet by mouth Every 8 (Eight) Hours As Needed for Nausea or Vomiting. 90 tablet 0     No current  "facility-administered medications on file prior to visit.        ALLERGIES:    Allergies   Allergen Reactions   • Benadryl [Diphenhydramine] Itching and Other (See Comments)     INCREASES BLOOD PRESSURE   • Erythromycin GI Intolerance   • Levaquin [Levofloxacin] GI Intolerance   • Penicillins GI Intolerance        Social History     Socioeconomic History   • Marital status:      Spouse name: Cruz   • Number of children: 0   • Years of education: Not on file   • Highest education level: Not on file   Tobacco Use   • Smoking status: Never Smoker   • Smokeless tobacco: Never Used   Substance and Sexual Activity   • Alcohol use: Not Currently     Alcohol/week: 7.0 standard drinks     Types: 4 Glasses of wine, 3 Cans of beer per week   • Drug use: No   • Sexual activity: Not Currently     Partners: Male     Birth control/protection: Post-menopausal        Family History   Problem Relation Age of Onset   • Lung cancer Father    • Cancer Mother    • Breast cancer Mother    • Liver disease Mother    • Breast cancer Sister 48   • Breast cancer Maternal Grandmother    • Breast cancer Paternal Grandmother    • Breast cancer Maternal Uncle    • Malig Hyperthermia Neg Hx             Objective     Vitals:    05/17/21 0840   BP: 147/80   Pulse: 58   Resp: 20   Temp: 97.8 °F (36.6 °C)   TempSrc: Temporal   SpO2: 98%   Weight: 74.1 kg (163 lb 6.4 oz)   Height: 165.1 cm (65\")   PainSc:   3   PainLoc: Generalized  Comment: hip and knee     Current Status 5/17/2021   ECOG score 0     Exam:       I HAVE PERSONALLY REVIEWED THE HISTORY OF THE PRESENT ILLNESS, PAST MEDICAL HISTORY, FAMILY HISTORY, SOCIAL HISTORY, ALLERGIES, MEDICATIONS STATED ABOVE IN THE OFFICE NOTE FROM TODAY.        GENERAL:  Well-developed, well-nourished  Patient  in no acute distress.   SKIN:  Warm, dry ,NO rashes,NO purpura ,NO petechiae.  HEENT:  Pupils were equal and reactive to light and accomodation, conjunctivae noninjected, no pterygium, normal " extraocular movements, normal visual acuity.   NECK:  Supple with good range of motion; no thyromegaly or masses, no JVD or bruits, no cervical adenopathies.No carotid artery pain, no carotid abnormal pulsation , NO arterial dance.  LYMPHATICS:  No cervical, NO supraclavicular, NO axillary,NO epitrochlear , NO inguinal adenopathy.  CARDIAC   normal rate and regular rhythm, without murmur,NO rubs NO S3 NO S4 right or left .   CHEST WALL BILATERAL MASTECTOMIES, NO INDURATION OR MASSES.  VASCULAR VENOUS: no cyanosis, collateral circulation, varicosities, edema, palpable cords, pain, erythema.  ABDOMEN:  Soft, nontender with no hepatomegaly, no splenomegaly,no masses, no ascites, no collateral circulation,no distention,no Vernon sign, no abdominal pain, no inguinal hernias,no umbilical hernia, no abdominal bruits. Normal bowel sounds.  GENITAL: Not  Performed.  EXTREMITIES  AND SPINE:  No clubbing, cyanosis or edema, no deformities LEFT HIP pain . kyphosis, scoliosis  NEUROLOGICAL:  Patient was awake, alert, oriented to time, person and place.            RECENT LABS:  Hematology WBC   Date Value Ref Range Status   05/17/2021 4.85 3.40 - 10.80 10*3/mm3 Final     RBC   Date Value Ref Range Status   05/17/2021 3.86 3.77 - 5.28 10*6/mm3 Final     Hemoglobin   Date Value Ref Range Status   05/17/2021 12.1 12.0 - 15.9 g/dL Final     Hematocrit   Date Value Ref Range Status   05/17/2021 38.0 34.0 - 46.6 % Final     Platelets   Date Value Ref Range Status   05/17/2021 207 140 - 450 10*3/mm3 Final        Component      Latest Ref Rng & Units 1/22/2019 4/12/2019 6/21/2019 8/14/2019   CA 15-3      <=25.0 U/mL 44.1 (H) 33.3 (H) 17.0 17.3     Component      Latest Ref Rng & Units 12/17/2019 6/29/2020 9/21/2020 1/21/2021   CA 15-3      <=25.0 U/mL 12.8 11.1 11.9 14.2     Component      Latest Ref Rng & Units 4/16/2021   CA 15-3      <=25.0 U/mL 15.9       CT CHEST, ABDOMEN, AND PELVIS WITH IV CONTRAST     HISTORY: Breast cancer with  elevated CA 15-3 levels history of radiation  right chest wall     TECHNIQUE: Radiation dose reduction techniques were utilized, including  automated exposure control and exposure modulation based on body size.   3 mm images were obtained through the chest, abdomen, and pelvis with IV  contrast.      COMPARISON: PET/CT 08/14/2019     FINDINGS:      Chest:      There is no hilar, or axillary adenopathy by size criteria. Postsurgical  changes from bilateral axillary lymph node dissection and mastectomy are  present. Asymmetric skin thickening is present within the superior  lateral aspect of the left mastectomy bed, the same quadrant where the  infiltrative soft tissue mass was centered on prior PET/CT. No other  postoperative imaging is available for comparison.     There is no significant pericardial effusion. A borderline enlarged  cardiophrenic node measuring 1 cm in short axis dimension is new since  08/14/2019. Ill-defined reticulonodular opacification is present within  the anterior right upper and middle lobes, new since 2019. There is no  pulmonary consolidation, pleural effusion or pneumothorax. Pleural  parenchyma scarring is present within the left upper lobe and lingula.  Sub-6 mm pulmonary nodules within the bilateral lower lobes and right  upper lobe are grossly unchanged since 01/24/2019.     Abdomen and pelvis:      There is colonic diverticulosis. The appendix is unremarkable. The  bladder is not well evaluated due to streak artifact from right total  hip arthroplasty.     There are no findings of small bowel obstruction. The liver,  gallbladder, pancreas, spleen and adrenal glands have an unremarkable  postcontrast CT appearance. Subcentimeter left renal lesion is too small  to characterize. There is no hydronephrosis.     No abdominopelvic adenopathy is present by size criteria. Mildly  prominent mesenteric and retroperitoneal nodes measure up to 0.8 cm in  short axis dimension, grossly unchanged  since 08/14/2019 where they did  not demonstrate FDG uptake above that of blood pool. There is no free  intraperitoneal air or fluid. Bilateral adnexal cystic lesions favored  to be ovarian in origin are present, as before, measuring up to 7 cm on  the (previously 6.2 cm on 01/24/2019). Right adnexal cystic lesion is  also mildly increased in size, measuring 2.9 cm (previously 2 cm).     Bone windows: No suspicious lytic or blastic osseous lesions are  present. Right total hip arthroplasty is present. Dextrocurvature the  lumbar spine with multilevel moderate to severe degenerative changes are  present. Severe osteoarthritis is present within the left hip, as  before.     IMPRESSION:  Impression:  1.  Interval development of cardiophrenic adenopathy highly concerning  for metastatic disease. Further evaluation with PET/CT is recommended to  further characterize as this area is not amenable to percutaneous  sampling due to its proximity to the heart.  2.  Ill-defined soft tissue thickening within the superior lateral  aspect of the left breast. Given this is the location of the previously  seen primary tumor, findings may indicate malignant recurrence; however  findings are nonspecific as there are no other postoperative CTs for  comparison. Correlation with physical examination and attention on  above-mentioned PET CT is recommended.  3.  Left adnexal cystic lesion which has mildly increased in size since  2019 and remains incompletely characterized. Findings can be further  evaluated with MRI of the pelvis if clinically indicated.  4.  Presumed postradiation changes within the right middle and upper  lobes anteriorly. However, findings remain nonspecific and continued  attention on follow-up is recommended to ensure stability.  5.  Other incidental and chronic findings as above.     This report was finalized on 5/10/2021 4:49 PM by Dr. Myron Wilkins M.D.  WHOLE BODY BONE SCAN     HISTORY: Breast cancer.      TECHNIQUE: Whole body bone scan was performed using 23.2 mCi of  technetium MDP and is correlated with CT scans of the chest, abdomen,  and pelvis which were performed today and are reported separately. This  is also correlated with bone scan 01/24/2019.     FINDINGS: There is normal genitourinary radiotracer activity. Cold  defect at the right hip corresponds to arthroplasty hardware. Focal  uptake at the hands and wrists, the superior aspect of the left hip  joint, and the patella at each knee is typical for degenerative change.  Activity at the left hip corresponds to advanced osteoarthritis seen at  that articulation on the CT scan. There is no scintigraphic abnormality  suggestive of osseous metastasis.     IMPRESSION:  Negative.     This report was finalized on 5/10/2021 3:43 PM by Dr. Tony Amaro M.D.         Assessment/Plan    1.  History of least for the last 4 years, she has had an in crescendo mass in the left breast that has produced a gigantic tumor that WAS close to 25 cm in size and is replacing most of the anatomy of the left breast. There are areas of ulceration necrosis and bleeding and the mass is fixed to the chest wall. She has abundant amount of collateral circulation in the left anterior chest wall and it caught my attention the lack of any left axillary adenopathy. She has no lymphedema in the left upper extremity. In the right breast while in sitting position, no palpable abnormalities are documented. The right breast skin and nipple are normal. When the patient lies flat, there is a palpable mass at 9 o'clock from the nipple that measures probably 4 cm in size, very indistinct in regard to edges and margins. There was no mobility and no areas of tenderness. She has a giant adenopathy in the right axilla that measures close to 9 cm in size, uniform, no fluctuation, nontender, completely fixed to the axillary wall. There is no compromise of the skin.     After completion of 4 cycles of  AC patient has had very dramatic improvement in all sites of disease including right axilla and left breast.    · Completed 4 cycles Adriamycin Cytoxan on 4/12/2019.    · Patient started weekly Taxol treatments on 5/3/2019.  · Completed 12 weekly Taxol treatments on 7/19/2019.  · 9/16/2019 bilateral mastectomy by Dr. Joby Barron with axillary lymph node dissection.  No residual malignancy.  · 10/30/2019 patient's Mediport was removed in anticipation of radiation.  · Radiation therapy under the care of Dr. Marmolejo 10/31/2019-1/13/2020 to the right chest wall.  · Started on adjuvant Femara 2.5 mg daily 8/20/2019.  · 9/21/2020 continues on adjuvant Femara, tolerating it quite well.  Some mild hot flashes and mild arthralgias, all which are tolerable.  I advised the patient that at this point her breast cancer remains in remission. She is 100% compliant of her medication letrozole and her clinical examination remains negative normal for breast cancer recurrence.   The patient was further reviewed on 04/16/2021. Her clinical examination is completely negative normal and her questioning is negative in regard to symptoms that would suggest breast cancer recurrence. She is 100% compliant with her Femara. Her white count, hemoglobin and platelets remain normal. Her tumor marker during the previous visit was normal and we have another one pending today CA 15-3.   The patient was further reviewed on 05/17/2021 along with her brother. The clinical examination has not changed. The only new complaint is the progressive amount of discomfort and the inability to function given the pain in the left hip area. Now she is limping. The only time when she is not in discomfort with the left hip is when she is sitting or lying in bed or riding the horse when gravity takes away the pressure of her left hip area. Radiologically speaking the CT scan of the chest, abdomen and pelvis to my eyes disclosed no abnormalities besides a very simple  ovarian cyst that measures close to 7 x 5 cm with no trabeculation. There is no pelvic ascites. There is no pelvic adenopathy. The other abnormality obviously visible is the severe degree of scoliosis of the thoracic, lumbar spines.     It caught my attention the subchondral cyst in the left hip and the minimal if any space leftover between the head of the femur and the acetabulum. There is no metastasis in this anatomical site.     The radiologist mentioned cardiophrenic angle lymphadenopathy. I cannot see that from my naked eyes. I do not know what it is and I do not know how to express it. Pulmonary anatomy is normal. Hilar adenopathy is normal. No pericardial effusion. No pleural effusion. Minimal infiltrate in the lungs related to radiation changes. Liver anatomy, pancreas and kidneys were unremarkable. The scoliosis is very obvious in the view of the abdomen.     Her CA 15-3 was minimal elevated in comparison to previous assessment and it raises the following question.   1. Is the cardiophrenic node described by the radiologist indeed significant of breast cancer recurrence or not? The only way to know will be to do a PET scan. This will be scheduled.   2. She is known for having an ovarian cyst for a long time but now is getting bigger, 7 cm across, and has no TABICATION with no pelvic ascites. I do not believe that this is representation of malignancy; nevertheless, I am going to run a CA-125 on her and I will proceed with a pelvic ultrasound as well as a GYN oncology referral for review.   3. I will send a notification to Dr. Leonard, the patient’s orthopedic surgeon, in regard to all her issues pertinent to the left hip. I think the patient is getting closer and closer to having a left hip replacement. 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 She recovered extremely well from her right hip  surgery before.     In regard to her hypertension, she remains on her blood pressure medication by me.     In regard to the previous history of stroke, she remains on her aspirin.     I discussed with her the need to remain on her Femara as before and I discussed with her Verzenio as before but she is not interested in taking any Verzenio at this time.     We will have a VideoMedicine visit once that we get to know the report of the PET scan.     Also we will talk with her about her CA-125 and we will talk with her about the pelvic ultrasound.     I discussed all these facts with the patient and her brother in the room.    ·

## 2021-05-18 ENCOUNTER — HOSPITAL ENCOUNTER (OUTPATIENT)
Dept: ULTRASOUND IMAGING | Facility: HOSPITAL | Age: 61
Discharge: HOME | End: 2021-05-18
Admitting: INTERNAL MEDICINE

## 2021-05-18 DIAGNOSIS — C50.812 MALIGNANT NEOPLASM OF OVERLAPPING SITES OF BOTH BREASTS IN FEMALE, ESTROGEN RECEPTOR POSITIVE (HCC): ICD-10-CM

## 2021-05-18 DIAGNOSIS — N83.202 CYST OF LEFT OVARY: ICD-10-CM

## 2021-05-18 DIAGNOSIS — Z17.0 MALIGNANT NEOPLASM OF OVERLAPPING SITES OF BOTH BREASTS IN FEMALE, ESTROGEN RECEPTOR POSITIVE (HCC): ICD-10-CM

## 2021-05-18 DIAGNOSIS — C50.811 MALIGNANT NEOPLASM OF OVERLAPPING SITES OF BOTH BREASTS IN FEMALE, ESTROGEN RECEPTOR POSITIVE (HCC): ICD-10-CM

## 2021-05-18 PROCEDURE — 76856 US EXAM PELVIC COMPLETE: CPT

## 2021-05-18 PROCEDURE — 76830 TRANSVAGINAL US NON-OB: CPT

## 2021-05-19 ENCOUNTER — HOSPITAL ENCOUNTER (OUTPATIENT)
Dept: PET IMAGING | Facility: HOSPITAL | Age: 61
Discharge: HOME | End: 2021-05-19

## 2021-05-19 DIAGNOSIS — C50.811 MALIGNANT NEOPLASM OF OVERLAPPING SITES OF BOTH BREASTS IN FEMALE, ESTROGEN RECEPTOR POSITIVE (HCC): ICD-10-CM

## 2021-05-19 DIAGNOSIS — C50.812 MALIGNANT NEOPLASM OF OVERLAPPING SITES OF BOTH BREASTS IN FEMALE, ESTROGEN RECEPTOR POSITIVE (HCC): ICD-10-CM

## 2021-05-19 DIAGNOSIS — Z17.0 MALIGNANT NEOPLASM OF OVERLAPPING SITES OF BOTH BREASTS IN FEMALE, ESTROGEN RECEPTOR POSITIVE (HCC): ICD-10-CM

## 2021-05-19 LAB — GLUCOSE BLDC GLUCOMTR-MCNC: 74 MG/DL (ref 70–130)

## 2021-05-19 PROCEDURE — 0 FLUDEOXYGLUCOSE F18 SOLUTION: Performed by: INTERNAL MEDICINE

## 2021-05-19 PROCEDURE — 82962 GLUCOSE BLOOD TEST: CPT

## 2021-05-19 PROCEDURE — A9552 F18 FDG: HCPCS | Performed by: INTERNAL MEDICINE

## 2021-05-19 PROCEDURE — 78815 PET IMAGE W/CT SKULL-THIGH: CPT

## 2021-05-19 RX ADMIN — FLUDEOXYGLUCOSE F18 1 DOSE: 300 INJECTION INTRAVENOUS at 12:40

## 2021-05-20 ENCOUNTER — TELEPHONE (OUTPATIENT)
Dept: ONCOLOGY | Facility: CLINIC | Age: 61
End: 2021-05-20

## 2021-05-20 NOTE — TELEPHONE ENCOUNTER
EXAMINATION: COMPLETE PELVIC SONOGRAM     HISTORY: 61-year-old female with a history of bilateral ovarian cyst and  a history of breast carcinoma.     FINDINGS: Transabdominal and transvaginal pelvic sonography was  performed. The uterus is anteflexed and measures 5.0 x 2.3 x 3.2 cm. The  endometrium measures on the order of 0.4 cm in thickness. No abnormality  of the uterus is appreciated.     The right ovary measures 2.9 x 2.1 x 2.3 cm and the left ovary measures  6.6 x 4.3 x 7.8 cm. A 2.7 cm cyst with a thin internal septation is seen  within the right ovary. No nodularity within the cyst is appreciated. In  the left ovary there is a 6.6 x 4.3 x 6.8 cm unilocular simple cyst. No  nodularity of the cyst is appreciated.     No free fluid is seen within the pelvic cul-de-sac.     IMPRESSION:  1. There are bilateral ovarian cysts, left larger than right, that have  a benign appearance. The cyst in the right ovary measures 2.7 cm in  greatest dimension and contains an internal septation without detectable  internal vascularity. The left ovarian cyst measures 6.8 cm in greatest  dimension and has a unilocular appearance. While the cysts are benign in  appearance, there can be some limitations when evaluating a cyst the  size of a left ovarian cyst. Further evaluation with a pelvic MRI  without and with contrast is recommended.  2. Normal sonographic appearance of the uterus.     This report was finalized on 5/20/2021 7:22 AM by Dr. Juan J Guevara M.D.  I called the patient today and left a message on her voicemail. The ovarian cysts are benign with no tabication and besides that her  is completely normal at 5. I find no need for her to see GYN Oncology. We will cancel that appointment. I asked her to call if she has any questions.

## 2021-05-24 ENCOUNTER — TELEPHONE (OUTPATIENT)
Dept: ONCOLOGY | Facility: CLINIC | Age: 61
End: 2021-05-24

## 2021-05-24 ENCOUNTER — OFFICE VISIT (OUTPATIENT)
Dept: ONCOLOGY | Facility: CLINIC | Age: 61
End: 2021-05-24

## 2021-05-24 ENCOUNTER — LAB (OUTPATIENT)
Dept: LAB | Facility: HOSPITAL | Age: 61
End: 2021-05-24

## 2021-05-24 ENCOUNTER — CLINICAL SUPPORT (OUTPATIENT)
Dept: ONCOLOGY | Facility: HOSPITAL | Age: 61
End: 2021-05-24

## 2021-05-24 VITALS
WEIGHT: 159.6 LBS | HEIGHT: 65 IN | DIASTOLIC BLOOD PRESSURE: 73 MMHG | RESPIRATION RATE: 16 BRPM | HEART RATE: 57 BPM | BODY MASS INDEX: 26.59 KG/M2 | OXYGEN SATURATION: 99 % | TEMPERATURE: 98.2 F | SYSTOLIC BLOOD PRESSURE: 116 MMHG

## 2021-05-24 VITALS — DIASTOLIC BLOOD PRESSURE: 73 MMHG | SYSTOLIC BLOOD PRESSURE: 116 MMHG

## 2021-05-24 DIAGNOSIS — Z17.0 MALIGNANT NEOPLASM OF OVERLAPPING SITES OF LEFT BREAST IN FEMALE, ESTROGEN RECEPTOR POSITIVE (HCC): Primary | ICD-10-CM

## 2021-05-24 DIAGNOSIS — C50.812 MALIGNANT NEOPLASM OF OVERLAPPING SITES OF LEFT BREAST IN FEMALE, ESTROGEN RECEPTOR POSITIVE (HCC): ICD-10-CM

## 2021-05-24 DIAGNOSIS — Z17.0 MALIGNANT NEOPLASM OF OVERLAPPING SITES OF LEFT BREAST IN FEMALE, ESTROGEN RECEPTOR POSITIVE (HCC): ICD-10-CM

## 2021-05-24 DIAGNOSIS — C50.812 MALIGNANT NEOPLASM OF OVERLAPPING SITES OF LEFT BREAST IN FEMALE, ESTROGEN RECEPTOR POSITIVE (HCC): Primary | ICD-10-CM

## 2021-05-24 DIAGNOSIS — C50.919 METASTATIC BREAST CANCER: Primary | ICD-10-CM

## 2021-05-24 LAB
BASOPHILS # BLD AUTO: 0.05 10*3/MM3 (ref 0–0.2)
BASOPHILS NFR BLD AUTO: 0.9 % (ref 0–1.5)
DEPRECATED RDW RBC AUTO: 49.2 FL (ref 37–54)
EOSINOPHIL # BLD AUTO: 0.08 10*3/MM3 (ref 0–0.4)
EOSINOPHIL NFR BLD AUTO: 1.4 % (ref 0.3–6.2)
ERYTHROCYTE [DISTWIDTH] IN BLOOD BY AUTOMATED COUNT: 13.7 % (ref 12.3–15.4)
HCT VFR BLD AUTO: 38.6 % (ref 34–46.6)
HGB BLD-MCNC: 12.7 G/DL (ref 12–15.9)
IMM GRANULOCYTES # BLD AUTO: 0.01 10*3/MM3 (ref 0–0.05)
IMM GRANULOCYTES NFR BLD AUTO: 0.2 % (ref 0–0.5)
LYMPHOCYTES # BLD AUTO: 1.01 10*3/MM3 (ref 0.7–3.1)
LYMPHOCYTES NFR BLD AUTO: 17.9 % (ref 19.6–45.3)
MCH RBC QN AUTO: 31.8 PG (ref 26.6–33)
MCHC RBC AUTO-ENTMCNC: 32.9 G/DL (ref 31.5–35.7)
MCV RBC AUTO: 96.5 FL (ref 79–97)
MONOCYTES # BLD AUTO: 0.37 10*3/MM3 (ref 0.1–0.9)
MONOCYTES NFR BLD AUTO: 6.5 % (ref 5–12)
NEUTROPHILS NFR BLD AUTO: 4.13 10*3/MM3 (ref 1.7–7)
NEUTROPHILS NFR BLD AUTO: 73.1 % (ref 42.7–76)
NRBC BLD AUTO-RTO: 0 /100 WBC (ref 0–0.2)
PLATELET # BLD AUTO: 208 10*3/MM3 (ref 140–450)
PMV BLD AUTO: 8.4 FL (ref 6–12)
RBC # BLD AUTO: 4 10*6/MM3 (ref 3.77–5.28)
WBC # BLD AUTO: 5.65 10*3/MM3 (ref 3.4–10.8)

## 2021-05-24 PROCEDURE — 85025 COMPLETE CBC W/AUTO DIFF WBC: CPT

## 2021-05-24 PROCEDURE — 93000 ELECTROCARDIOGRAM COMPLETE: CPT | Performed by: INTERNAL MEDICINE

## 2021-05-24 PROCEDURE — 93005 ELECTROCARDIOGRAM TRACING: CPT | Performed by: INTERNAL MEDICINE

## 2021-05-24 PROCEDURE — 93010 ELECTROCARDIOGRAM REPORT: CPT | Performed by: INTERNAL MEDICINE

## 2021-05-24 PROCEDURE — 99214 OFFICE O/P EST MOD 30 MIN: CPT | Performed by: INTERNAL MEDICINE

## 2021-05-24 PROCEDURE — 36415 COLL VENOUS BLD VENIPUNCTURE: CPT

## 2021-05-24 PROCEDURE — 93005 ELECTROCARDIOGRAM TRACING: CPT

## 2021-05-24 NOTE — TELEPHONE ENCOUNTER
----- Message from [facility] sent at 5/24/2021  1:49 PM EDT -----  Please change her oral chemo education session on 6/2 to pharmacy.  Kisqali is an oral agent.    ThanksKori    
[phone]

## 2021-05-24 NOTE — PROGRESS NOTES
Subjective     REASON FOR FOLLOW UP:  1. 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 R BREAST MASS WITH GIANT R AXILLARY ADENOPATHY.  3. FAMILY HISTORY OF BREAST CANCER IN MOTHER AND SISTER, SISTER WAS TESTED FOR BRCA AND WAS NEGATIVE.  4. LEFT OVARIAN CYST , IT HAS BEEN PRESENT FOR LONG TIME BUT IS GETTING LARGER, ASYMPTOMATIC, NEED FOR , PELVIC US AND GYN ONC EVEAL                 5. LEFT HIP PAIN SEVERE ARTHRITIS, REAL NEED FOR LEFT HIP REPLACEMENT.    History of Present Illness       DURING THE VISIT WITH THE PATIENT TODAY , PATIENT HAD FACE MASK, MY MEDICAL ASSISTANT AND I  HAD PROPPER PROTECTIVE EQUIPMENT, AND I DID HAND HYGIENE WITH SOAP AND WATER BEFORE AND AFTER THE VISIT.    This patient returns today to the office for followup in company of her brother. Since the previous visit a few weeks ago, we documented that her tumor marker, CA 15-3 has minimally risen and for that reason I decided to go ahead and proceed with a CT scan of the chest, abdomen and pelvis and a bone scan to be sure that her previous high risk breast cancer was not going to be recurrent. She is here to review this data. In the meantime she has had further deterioration of the pain and discomfort in the left hip and now she is limping. She is barely able to get around walking and when she rides a horse this is the only time off gravity that she feels no discomfort. Other than that, she has a good appetite, normal bowel activity, normal urination. No skeletal pain at any other site. No cardiovascular or respiratory issues. She continues taking her blood pressure medicine and her aspirin.    Neurologically she remains intact, a talker, and carries on her activities of daily living with no difficulties.      PT RETURNED TO THE OFFICE TODAY NO NEW SYMPTOMS.        Past Medical History:   Diagnosis Date   • Anemia in neoplastic disease    • Arthritis    • Breast cancer (CMS/HCC)     Left   • CVA (cerebral  vascular accident) (CMS/HCC)    • Hip pain     RIGHT HIP... CYST   • History of fracture of leg 1987   • History of radiation therapy     LAST TREATMENT     • Hypertension    • Limited joint range of motion (ROM)     RIGHT HIP   • Skin sore     OPEN SORE LEFT BREAST   • Syncope    • Vertigo            Past Surgical History:   Procedure Laterality Date   • AXILLARY LYMPH NODE BIOPSY/EXCISION Right     LYMPH NODE UNDER RIGHT ARM-MALIGNANT (DOUBLE MASTECTOMY)   • BREAST BIOPSY Left     MALIGNANT   • FRACTURE SURGERY  1987    Leg   • HARDWARE REMOVAL     • MASTECTOMY W/ SENTINEL NODE BIOPSY Bilateral 9/16/2019    Procedure: BILATERLA MODIFIED RADICAL MASTECTOMY WITH BILATERAL SENTINEL LYMPH NODE BIOPSY;  Surgeon: Joby Barron Jr., MD;  Location: [facility] OR;  Service: General   • TOTAL HIP ARTHROPLASTY Right 3/2/2020    Procedure: RIGHT TOTAL HIP ARTHROPLASTY NATALY NAVIGATION;  Surgeon: Luis M Leonard MD;  Location: [facility] OR;  Service: Orthopedics;  Laterality: Right;   • VENOUS ACCESS DEVICE (PORT) INSERTION Right 2/1/2019    Procedure: INSERTION VENOUS ACCESS DEVICE;  Surgeon: Joby Barron Jr., MD;  Location: [facility];  Service: General   • VENOUS ACCESS DEVICE (PORT) REMOVAL N/A 10/30/2019    Procedure: Mediport Removal;  Surgeon: Joby Barron Jr., MD;  Location: [facility] OR;  Service: General        Current Outpatient Medications on File Prior to Visit   Medication Sig Dispense Refill   • diclofenac (VOLTAREN) 75 MG EC tablet Take 1 tablet by mouth 2 (Two) Times a Day. 60 tablet 3   • letrozole (FEMARA) 2.5 MG tablet Take 1 tablet by mouth Daily. 90 tablet 2   • meclizine (ANTIVERT) 25 MG tablet Take 1 tablet by mouth 3 (Three) Times a Day As Needed for Dizziness. 90 tablet 0   • metoprolol succinate XL (TOPROL-XL) 25 MG 24 hr tablet TAKE 1 TABLET BY MOUTH EVERY DAY 30 tablet 6   • ondansetron (ZOFRAN) 4 MG tablet Take 1 tablet by mouth Every 8 (Eight) Hours As Needed for Nausea  "or Vomiting. 90 tablet 0     No current facility-administered medications on file prior to visit.        ALLERGIES:    Allergies   Allergen Reactions   • Benadryl [Diphenhydramine] Itching     INCREASES BLOOD PRESSURE   • Erythromycin GI Intolerance   • Levaquin [Levofloxacin] GI Intolerance   • Penicillins GI Intolerance        Social History     Socioeconomic History   • Marital status:      Spouse name: Cruz   • Number of children: 0   • Years of education: Not on file   • Highest education level: Not on file   Tobacco Use   • Smoking status: Never Smoker   • Smokeless tobacco: Never Used   Substance and Sexual Activity   • Alcohol use: Not Currently     Alcohol/week: 7.0 standard drinks     Types: 4 Glasses of wine, 3 Cans of beer per week   • Drug use: No   • Sexual activity: Not Currently     Partners: Male     Birth control/protection: Post-menopausal        Family History   Problem Relation Age of Onset   • Lung cancer Father    • Cancer Mother    • Breast cancer Mother    • Liver disease Mother    • Breast cancer Sister 48   • Breast cancer Maternal Grandmother    • Breast cancer Paternal Grandmother    • Breast cancer Maternal Uncle    • Malig Hyperthermia Neg Hx             Objective     Vitals:    05/24/21 1019   BP: 116/73   Pulse: 57   Resp: 16   Temp: 98.2 °F (36.8 °C)   TempSrc: Temporal   SpO2: 99%   Weight: 72.4 kg (159 lb 9.6 oz)   Height: 165.1 cm (65\")   PainSc:   8   PainLoc: Hip     Current Status 5/24/2021   ECOG score 0     Exam:       I HAVE PERSONALLY REVIEWED THE HISTORY OF THE PRESENT ILLNESS, PAST MEDICAL HISTORY, FAMILY HISTORY, SOCIAL HISTORY, ALLERGIES, MEDICATIONS STATED ABOVE IN THE OFFICE NOTE FROM TODAY.        GENERAL:  Well-developed, well-nourished  Patient  in no acute distress.   SKIN:  Warm, dry ,NO rashes,NO purpura ,NO petechiae.  HEENT:  Pupils were equal and reactive to light and accomodation, conjunctivae noninjected, no pterygium, normal extraocular " movements, normal visual acuity.   NECK:  Supple with good range of motion; no thyromegaly or masses, no JVD or bruits, no cervical adenopathies.No carotid artery pain, no carotid abnormal pulsation , NO arterial dance.  LYMPHATICS:  No cervical, NO supraclavicular, NO axillary,NO epitrochlear , NO inguinal adenopathy.  CARDIAC   normal rate and regular rhythm, without murmur,NO rubs NO S3 NO S4 right or left .   CHEST WALL BILATERAL MASTECTOMIES, NO INDURATION OR MASSES.  VASCULAR VENOUS: no cyanosis, collateral circulation, varicosities, edema, palpable cords, pain, erythema.  ABDOMEN:  Soft, nontender with no hepatomegaly, no splenomegaly,no masses, no ascites, no collateral circulation,no distention,no Alamo sign, no abdominal pain, no inguinal hernias,no umbilical hernia, no abdominal bruits. Normal bowel sounds.  GENITAL: Not  Performed.  EXTREMITIES  AND SPINE:  No clubbing, cyanosis or edema, no deformities LEFT HIP pain . kyphosis, scoliosis  NEUROLOGICAL:  Patient was awake, alert, oriented to time, person and place.    Unchanged documented by idalmis britton md 5/24/21        RECENT LABS:  Hematology WBC   Date Value Ref Range Status   05/24/2021 5.65 3.40 - 10.80 10*3/mm3 Final     RBC   Date Value Ref Range Status   05/24/2021 4.00 3.77 - 5.28 10*6/mm3 Final     Hemoglobin   Date Value Ref Range Status   05/24/2021 12.7 12.0 - 15.9 g/dL Final     Hematocrit   Date Value Ref Range Status   05/24/2021 38.6 34.0 - 46.6 % Final     Platelets   Date Value Ref Range Status   05/24/2021 208 140 - 450 10*3/mm3 Final        FDG PET/CT IMAGING SKULL BASE TO MID THIGH      HISTORY: Breast carcinoma. Restaging.     TECHNIQUE: Blood glucose level at time of injection of FDG was 74 mg/dl.   7.0 mCi of FDG was injected. Approximately 90 minutes later, PET images  were obtained. CT images were acquired for attenuation correction and  anatomic localization.     COMPARISON: CT scan of the chest dated 05/10/2021. PET/CT  imaging dated  08/14/2019.     FINDINGS: The patient is status post bilateral mastectomy without breast  reconstruction. The chest wall is otherwise unremarkable. No chest wall  masses are identified. Surgical clips are noted in both axillary regions  consistent with previous axillary lymph node dissection. There is no  axillary lymphadenopathy. There is a 2.6 x 1.0 cm diameter enlarged  lymph node in the right cardiophrenic fat pad that was a new finding on  the CT scan of the chest. This is also new since the remote previous  PET/CT study. The lymph node demonstrates significant hypermetabolism  with maximum measured SUV of 6.8 g/mL. The findings are consistent with  localized metastatic disease. No other foci of pathologic  hypermetabolism are identified in the chest. There is physiologic  distribution of the radiopharmaceutical within the neck and abdomen and  pelvis. There is an approximately 7.0 x 4.8 cm diameter unilocular cyst  in the left adnexa that most likely arises from the ovary. This shows  slight increase in size since the preceding PET/CT study where it  measured up to 5.5 cm in diameter. The mass remains completely  photopenic which along with its relative stability, strongly supports a  benign etiology.     IMPRESSION:  Solitary mildly enlarged, moderately hypermetabolic lymph  node in the right cardiophrenic fat pad consistent with localized  metastatic disease. Otherwise unremarkable PET/CT imaging.     This report was finalized on 5/20/2021 1:18 PM by Dr. Sridhar Bee M.D.    Order: [id number]  Status:  Final result   Visible to patient:  Yes (MyChart)   Next appt:  05/26/2021 at 02:10 PM in Orthopedic Surgery (Luis M Leonard MD)   Dx:  Cyst of left ovary; Malignant neoplas...  Specimen Information: Blood        Component   Ref Range & Units 7 d ago      0.0 - 38.1 U/mL 5.5    Resulting Agency  [facility]      Narrative  Performed by: [facility]  Results may be falsely decreased  if patient taking Biotin.       Specimen Collected: 05/17/21 08:33   Last Resulted: 05/17/21 17:14           EXAMINATION: COMPLETE PELVIC SONOGRAM     HISTORY: 61-year-old female with a history of bilateral ovarian cyst and  a history of breast carcinoma.     FINDINGS: Transabdominal and transvaginal pelvic sonography was  performed. The uterus is anteflexed and measures 5.0 x 2.3 x 3.2 cm. The  endometrium measures on the order of 0.4 cm in thickness. No abnormality  of the uterus is appreciated.     The right ovary measures 2.9 x 2.1 x 2.3 cm and the left ovary measures  6.6 x 4.3 x 7.8 cm. A 2.7 cm cyst with a thin internal septation is seen  within the right ovary. No nodularity within the cyst is appreciated. In  the left ovary there is a 6.6 x 4.3 x 6.8 cm unilocular simple cyst. No  nodularity of the cyst is appreciated.     No free fluid is seen within the pelvic cul-de-sac.     IMPRESSION:  1. There are bilateral ovarian cysts, left larger than right, that have  a benign appearance. The cyst in the right ovary measures 2.7 cm in  greatest dimension and contains an internal septation without detectable  internal vascularity. The left ovarian cyst measures 6.8 cm in greatest  dimension and has a unilocular appearance. While the cysts are benign in  appearance, there can be some limitations when evaluating a cyst the  size of a left ovarian cyst. Further evaluation with a pelvic MRI  without and with contrast is recommended.  2. Normal sonographic appearance of the uterus.     This report was finalized on 5/20/2021 7:22 AM by Dr. Juan J Guevara M.D.       Assessment/Plan    1.  History of least for the last 4 years, she has had an in crescendo mass in the left breast that has produced a gigantic tumor that WAS close to 25 cm in size and is replacing most of the anatomy of the left breast. There are areas of ulceration necrosis and bleeding and the mass is fixed to the chest wall. She has abundant amount of  collateral circulation in the left anterior chest wall and it caught my attention the lack of any left axillary adenopathy. She has no lymphedema in the left upper extremity. In the right breast while in sitting position, no palpable abnormalities are documented. The right breast skin and nipple are normal. When the patient lies flat, there is a palpable mass at 9 o'clock from the nipple that measures probably 4 cm in size, very indistinct in regard to edges and margins. There was no mobility and no areas of tenderness. She has a giant adenopathy in the right axilla that measures close to 9 cm in size, uniform, no fluctuation, nontender, completely fixed to the axillary wall. There is no compromise of the skin.     After completion of 4 cycles of AC patient has had very dramatic improvement in all sites of disease including right axilla and left breast.    · Completed 4 cycles Adriamycin Cytoxan on 4/12/2019.    · Patient started weekly Taxol treatments on 5/3/2019.  · Completed 12 weekly Taxol treatments on 7/19/2019.  · 9/16/2019 bilateral mastectomy by Dr. Joby Barron with axillary lymph node dissection.  No residual malignancy.  · 10/30/2019 patient's Mediport was removed in anticipation of radiation.  · Radiation therapy under the care of Dr. Marmolejo 10/31/2019-1/13/2020 to the right chest wall.  · Started on adjuvant Femara 2.5 mg daily 8/20/2019.  · 9/21/2020 continues on adjuvant Femara, tolerating it quite well.  Some mild hot flashes and mild arthralgias, all which are tolerable.  I advised the patient that at this point her breast cancer remains in remission. She is 100% compliant of her medication letrozole and her clinical examination remains negative normal for breast cancer recurrence.   The patient was further reviewed on 04/16/2021. Her clinical examination is completely negative normal and her questioning is negative in regard to symptoms that would suggest breast cancer recurrence. She is 100%  compliant with her Femara. Her white count, hemoglobin and platelets remain normal. Her tumor marker during the previous visit was normal and we have another one pending today CA 15-3.   The patient was further reviewed on 05/17/2021 along with her brother. The clinical examination has not changed. The only new complaint is the progressive amount of discomfort and the inability to function given the pain in the left hip area. Now she is limping. The only time when she is not in discomfort with the left hip is when she is sitting or lying in bed or riding the horse when gravity takes away the pressure of her left hip area. Radiologically speaking the CT scan of the chest, abdomen and pelvis to my eyes disclosed no abnormalities besides a very simple ovarian cyst that measures close to 7 x 5 cm with no trabeculation. There is no pelvic ascites. There is no pelvic adenopathy. The other abnormality obviously visible is the severe degree of scoliosis of the thoracic, lumbar spines.     It caught my attention the subchondral cyst in the left hip and the minimal if any space leftover between the head of the femur and the acetabulum. There is no metastasis in this anatomical site.     The radiologist mentioned cardiophrenic angle lymphadenopathy. I cannot see that from my naked eyes. I do not know what it is and I do not know how to express it. Pulmonary anatomy is normal. Hilar adenopathy is normal. No pericardial effusion. No pleural effusion. Minimal infiltrate in the lungs related to radiation changes. Liver anatomy, pancreas and kidneys were unremarkable. The scoliosis is very obvious in the view of the abdomen.     Her CA 15-3 was minimal elevated in comparison to previous assessment and it raises the following question.   1. Is the cardiophrenic node described by the radiologist indeed significant of breast cancer recurrence or not? The only way to know will be to do a PET scan. This will be scheduled.   2. She is  known for having an ovarian cyst for a long time but now is getting bigger, 7 cm across, and has no TABICATION with no pelvic ascites. I do not believe that this is representation of malignancy; nevertheless, I am going to run a CA-125 on her and I will proceed with a pelvic ultrasound as well as a GYN oncology referral for review.   3. I will send a notification to Dr. Leonard, the patient’s orthopedic surgeon, in regard to all her issues pertinent to the left hip. I think the patient is getting closer and closer to having a left hip replacement. 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 She recovered extremely well from her right hip surgery before.     The patient returned to the office on 05/24/2021. Since the previous visit the patient proceeded with a PET scan and I have reviewed this with her in the PAC system showing chest wall on the left side area of enlightening SUV activity that is almost 3 cm long x 7 mm wide. It is behind the bone. It is very close to the lower aspect of her heart. The reset of the PET scan discloses no activity. This is also specific in regard to the ovaries and actually an ultrasound of her vagina looking into the ovaries documented an unilocular cyst on the left side with typical features of benignity and a  was completely normal 5.5.     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 The rest of the PET scan is very much negative normal. I would not be surprised if this is an area of the internal mammary node chain.     Obviously the ovarian cyst is benign only locular with a normal  and I find no reason to refer her to GYN oncology anymore.     Therefore my advice to her is as  follows:  1. She will remain on her letrozole 2.5 mg a day.  2. She will initiate Kisqali at the usual dose 3 weeks on 1 week off making her aware of the side effects of the medicine including leukopenia, nausea, vomiting, rash, diarrhea, abnormalities in the liver function test and neutropenic fever. She will take the medicine at least for the next 6 months.  3. The patient will proceed with referral to radiation oncology to treat this area completely.  4. I am going to go ahead and make a referral to Cardiology in preparation for this site of radiation therapy and knowing that tangential fields will be difficult to produce, I think it will be important to have her to be seen by Cardiology in preparation of Kisqali use. Also I advised her that I would like for her to take a magnesium supplement and she needs to eat plenty of nuts to minimize hypomagnesemia that can help her to prolong QT interval that is the most important side effect of Kisqali to my eyes. I advised her to continue her blood pressure medication and I advised her also to have an EKG today and also have a repeated EKG upon return in 3 weeks.    5. The patient will be having formal education and consent for Kisqali and she knows that this medicine will come to her from a speciality pharmacy.   6. The patient will require to have a repeat PET scan at least 6-8 weeks after completion of her radiation therapy to the chest wall.   7. I advised her and her brother present on the telephone of all of these events and she was ready to proceed.

## 2021-05-25 ENCOUNTER — MEDICATION THERAPY MANAGEMENT (OUTPATIENT)
Dept: PHARMACY | Facility: HOSPITAL | Age: 61
End: 2021-05-25

## 2021-05-25 RX ORDER — RIBOCICLIB 200 MG/1
600 TABLET, FILM COATED ORAL DAILY
Qty: 63 TABLET | Refills: 3 | Status: SHIPPED | OUTPATIENT
Start: 2021-05-25 | End: 2021-05-27 | Stop reason: SDUPTHER

## 2021-05-25 NOTE — PROGRESS NOTES
"MTM encounter re initiation of Kisqali    PA submitted yesterday for approval of Kisqali to [employer]; awaiting response.      [employer] stated  \"\"This product does not require authorization at this time, and may be covered at the pharmacy in accordance with your benefit plan.\"  Rx escribed to MARY Pressley  "

## 2021-05-26 ENCOUNTER — PREP FOR SURGERY (OUTPATIENT)
Dept: OTHER | Facility: HOSPITAL | Age: 61
End: 2021-05-26

## 2021-05-26 ENCOUNTER — OFFICE VISIT (OUTPATIENT)
Dept: ORTHOPEDIC SURGERY | Facility: CLINIC | Age: 61
End: 2021-05-26

## 2021-05-26 VITALS — WEIGHT: 159 LBS | TEMPERATURE: 96.9 F | HEIGHT: 65 IN | BODY MASS INDEX: 26.49 KG/M2

## 2021-05-26 DIAGNOSIS — M16.12 ARTHRITIS OF LEFT HIP: Primary | ICD-10-CM

## 2021-05-26 DIAGNOSIS — M25.552 LEFT HIP PAIN: Primary | ICD-10-CM

## 2021-05-26 PROCEDURE — 99214 OFFICE O/P EST MOD 30 MIN: CPT | Performed by: ORTHOPAEDIC SURGERY

## 2021-05-26 PROCEDURE — 73502 X-RAY EXAM HIP UNI 2-3 VIEWS: CPT | Performed by: ORTHOPAEDIC SURGERY

## 2021-05-26 RX ORDER — MELOXICAM 15 MG/1
15 TABLET ORAL ONCE
Status: CANCELLED | OUTPATIENT
Start: 2021-07-20 | End: 2021-05-26

## 2021-05-26 RX ORDER — CHLORHEXIDINE GLUCONATE 500 MG/1
1 CLOTH TOPICAL TAKE AS DIRECTED
Status: CANCELLED | OUTPATIENT
Start: 2021-05-26

## 2021-05-26 RX ORDER — ACETAMINOPHEN 500 MG
1000 TABLET ORAL ONCE
Status: CANCELLED | OUTPATIENT
Start: 2021-07-20 | End: 2021-05-26

## 2021-05-26 RX ORDER — CEFAZOLIN SODIUM 2 G/100ML
2 INJECTION, SOLUTION INTRAVENOUS ONCE
Status: CANCELLED | OUTPATIENT
Start: 2021-07-20 | End: 2021-05-26

## 2021-05-26 RX ORDER — PREGABALIN 75 MG/1
150 CAPSULE ORAL ONCE
Status: CANCELLED | OUTPATIENT
Start: 2021-07-20 | End: 2021-05-26

## 2021-05-26 NOTE — PROGRESS NOTES
Patient Name: Marylu Georges   YOB: 1960  Referring Primary Care Physician: Provider, No Known  BMI: Body mass index is 26.46 kg/m².    Chief Complaint:    Chief Complaint   Patient presents with   • Left Hip - Follow-up        HPI:     Marylu Georges is a 61 y.o. female who presents today for evaluation of   Chief Complaint   Patient presents with   • Left Hip - Follow-up   . The patient is here today for follow-up on her left hip. She is status post right total hip arthroplasty on 03/02/2020. She is doing well. Her left hip is now worse than the right was prior to surgery. The patient has cancer and has been undergoing chemotherapy. She notes that her cancer was gone but last week they found another spot on top of the fat pad on her heart and a lymph node. The patient is back to riding horses 2 to 3 times per day.       Subjective   Medications:   Home Medications:  Current Outpatient Medications on File Prior to Visit   Medication Sig   • diclofenac (VOLTAREN) 75 MG EC tablet Take 1 tablet by mouth 2 (Two) Times a Day.   • letrozole (FEMARA) 2.5 MG tablet Take 1 tablet by mouth Daily.   • meclizine (ANTIVERT) 25 MG tablet Take 1 tablet by mouth 3 (Three) Times a Day As Needed for Dizziness.   • metoprolol succinate XL (TOPROL-XL) 25 MG 24 hr tablet TAKE 1 TABLET BY MOUTH EVERY DAY   • ondansetron (ZOFRAN) 4 MG tablet Take 1 tablet by mouth Every 8 (Eight) Hours As Needed for Nausea or Vomiting.   • ribociclib succinate 200 MG tablet therapy pack tablet Take 3 tablets by mouth Daily for 21 days. Take 600 mg by mouth daily for 21 days then off 7 days.     No current facility-administered medications on file prior to visit.     Current Medications:  Scheduled Meds:  Continuous Infusions:No current facility-administered medications for this visit.    PRN Meds:.    I have reviewed the patient's medical history in detail and updated the computerized patient record.  Review and summarization of old  records includes:    Past Medical History:   Diagnosis Date   • Anemia in neoplastic disease    • Arthritis    • Breast cancer (CMS/HCC)     Left   • CVA (cerebral vascular accident) (CMS/HCC)    • Hip pain     RIGHT HIP... CYST   • History of fracture of leg 1987   • History of radiation therapy     LAST TREATMENT     • Hypertension    • Limited joint range of motion (ROM)     RIGHT HIP   • Skin sore     OPEN SORE LEFT BREAST   • Syncope    • Vertigo         Past Surgical History:   Procedure Laterality Date   • AXILLARY LYMPH NODE BIOPSY/EXCISION Right     LYMPH NODE UNDER RIGHT ARM-MALIGNANT (DOUBLE MASTECTOMY)   • BREAST BIOPSY Left     MALIGNANT   • FRACTURE SURGERY  1987    Leg   • HARDWARE REMOVAL     • MASTECTOMY W/ SENTINEL NODE BIOPSY Bilateral 9/16/2019    Procedure: BILATERLA MODIFIED RADICAL MASTECTOMY WITH BILATERAL SENTINEL LYMPH NODE BIOPSY;  Surgeon: Joby Barron Jr., MD;  Location: [facility];  Service: General   • TOTAL HIP ARTHROPLASTY Right 3/2/2020    Procedure: RIGHT TOTAL HIP ARTHROPLASTY NATALY NAVIGATION;  Surgeon: Luis M Leonard MD;  Location: [facility];  Service: Orthopedics;  Laterality: Right;   • VENOUS ACCESS DEVICE (PORT) INSERTION Right 2/1/2019    Procedure: INSERTION VENOUS ACCESS DEVICE;  Surgeon: Joby Barron Jr., MD;  Location: [facility];  Service: General   • VENOUS ACCESS DEVICE (PORT) REMOVAL N/A 10/30/2019    Procedure: Mediport Removal;  Surgeon: Joby Barron Jr., MD;  Location: [facility];  Service: General        Social History     Occupational History   • Occupation:      Employer: SELF-EMPLOYED   Tobacco Use   • Smoking status: Never Smoker   • Smokeless tobacco: Never Used   Substance and Sexual Activity   • Alcohol use: Not Currently     Alcohol/week: 7.0 standard drinks     Types: 4 Glasses of wine, 3 Cans of beer per week   • Drug use: No   • Sexual activity: Not Currently     Partners: Male     Birth  "control/protection: Post-menopausal      Social History     Social History Narrative   • Not on file        Family History   Problem Relation Age of Onset   • Lung cancer Father    • Cancer Mother    • Breast cancer Mother    • Liver disease Mother    • Breast cancer Sister 48   • Breast cancer Maternal Grandmother    • Breast cancer Paternal Grandmother    • Breast cancer Maternal Uncle    • Malig Hyperthermia Neg Hx        ROS: 14 point review of systems was performed and all other systems were reviewed and are negative except for documented findings in HPI and today's encounter.     Allergies:   Allergies   Allergen Reactions   • Benadryl [Diphenhydramine] Itching     INCREASES BLOOD PRESSURE   • Erythromycin GI Intolerance   • Levaquin [Levofloxacin] GI Intolerance   • Penicillins GI Intolerance     Constitutional:  Denies fever, shaking or chills   Eyes:  Denies change in visual acuity   HENT:  Denies nasal congestion or sore throat   Respiratory:  Denies cough or shortness of breath   Cardiovascular:  Denies chest pain or severe LE edema   GI:  Denies abdominal pain, nausea, vomiting, bloody stools or diarrhea   Musculoskeletal:  Numbness, tingling, pain, or loss of motor function only as noted above in history of present illness.  : Denies painful urination or hematuria  Integument:  Denies rash, lesion or ulceration   Neurologic:  Denies headache or focal weakness  Endocrine:  Denies lymphadenopathy  Psych:  Denies confusion or change in mental status   Hem:  Denies active bleeding    OBJECTIVE:  Physical Exam: 61 y.o. female  Wt Readings from Last 3 Encounters:   05/26/21 72.1 kg (159 lb)   05/24/21 72.4 kg (159 lb 9.6 oz)   05/17/21 74.1 kg (163 lb 6.4 oz)     Ht Readings from Last 1 Encounters:   05/26/21 165.1 cm (65\")     Body mass index is 26.46 kg/m².  Vitals:    05/26/21 1406   Temp: 96.9 °F (36.1 °C)     Vital signs reviewed.     General Appearance:    Alert, cooperative, in no acute distress      "             Eyes: conjunctiva clear  ENT: external ears and nose atraumatic  CV: no peripheral edema  Resp: normal respiratory effort  Skin: no rashes or wounds; normal turgor  Psych: mood and affect appropriate  Lymph: no nodes appreciated  Neuro: gross sensation intact  Vascular:  Palpable peripheral pulse in noted extremity  Musculoskeletal Extremities: Examination today shows good flexion and extension. Internal rotation about 5 degrees that causes pain in the left hip that radiates down towards her knee.     Radiology:   AP and lateral views of the bilateral hips were obtained and reviewed in the office today for post-operative follow-up on the right and for pain on the left. With/Without comparison views, these demonstrated advanced end stage arthritis in the left hip and a well positioned right total hip arthroplasty.        Assessment:     ICD-10-CM ICD-9-CM   1. Left hip pain  M25.552 719.45        MDM/Plan:   The diagnosis(es), natural history, pathophysiology and treatment for diagnosis(es) were discussed. Opportunity given and questions answered.  Biomechanics of pertinent body areas discussed.  When appropriate, the use of ambulatory aids discussed.    We discussed the patient's bilateral hips. Her right hip looks great post surgery. Her left hip is now hurting pretty badly. She would like to proceed with surgery and we will schedule this when she is available.    SONA: Continuation of conservative management vs. SONA discussed.  I reviewed anatomy of a total hip arthroplasty in laymen's terms, as well as typical postoperative recovery and possibly 6-12 months for maximal recovery, and possible need for rehabilitation stay after hospitalization. We also discussed risks, benefits, alternatives, and limitations of procedure with risks including but not limited to neurovascular damage, bleeding, infection, malalignment, chronic pain, failure of implants, periprosthetic fracture, osteolysis, loosening of  implants, loss of motion, weakness, stiffness, instability, DVT, pulmonary embolus, death, stroke, complex regional pain syndrome, myocardial infarction, and need for additional procedures. Concept of substitution vs. replacement discussed.  No guarantees were given regarding results of surgery.  Patient verbalized understanding, and was given the opportunity to ask and have all questions answered today.       5/26/2021    Scribed for Luis M Leonard MD by Rodney Huynh.  05/26/21   17:20 EDT    I have personally performed the services described in this document as scribed by the above individual, and it is both accurate and complete.  Luis M Leonard MD  5/27/2021  11:38 EDT

## 2021-05-27 ENCOUNTER — MEDICATION THERAPY MANAGEMENT (OUTPATIENT)
Dept: PHARMACY | Facility: HOSPITAL | Age: 61
End: 2021-05-27

## 2021-05-27 NOTE — PROGRESS NOTES
MTM encounter re initiation of Kisqali     [facility] is unable to fill Kisqali.  Per [employer] patient must use [facility] and NDC [phone].  Rx transferred to [facility].

## 2021-05-28 ENCOUNTER — MEDICATION THERAPY MANAGEMENT (OUTPATIENT)
Dept: PHARMACY | Facility: HOSPITAL | Age: 61
End: 2021-05-28

## 2021-05-28 NOTE — PROGRESS NOTES
MTM encounter re initiation of Kisqali    Called [facility] today to verify that there will be no hold up on scheduling delivery of medication.  They stated they have everything needed to fill, and are verify rx coverage, and then will schedule delivery.

## 2021-06-02 ENCOUNTER — APPOINTMENT (OUTPATIENT)
Dept: LAB | Facility: HOSPITAL | Age: 61
End: 2021-06-02

## 2021-06-02 ENCOUNTER — MEDICATION THERAPY MANAGEMENT (OUTPATIENT)
Dept: PHARMACY | Facility: HOSPITAL | Age: 61
End: 2021-06-02

## 2021-06-02 ENCOUNTER — SPECIALTY PHARMACY (OUTPATIENT)
Dept: ONCOLOGY | Facility: HOSPITAL | Age: 61
End: 2021-06-02

## 2021-06-02 RX ORDER — LOPERAMIDE HYDROCHLORIDE 2 MG/1
2 CAPSULE ORAL 4 TIMES DAILY PRN
COMMUNITY
End: 2021-11-19

## 2021-06-02 RX ORDER — MAGNESIUM OXIDE 400 MG/1
400 TABLET ORAL 2 TIMES DAILY
COMMUNITY
End: 2022-07-13 | Stop reason: HOSPADM

## 2021-06-02 RX ORDER — CHROMIUM 200 MCG
1 TABLET ORAL
COMMUNITY
End: 2021-11-19

## 2021-06-02 RX ORDER — SENNOSIDES 8.6 MG
1950 CAPSULE ORAL EVERY 8 HOURS PRN
COMMUNITY
End: 2022-07-13 | Stop reason: HOSPADM

## 2021-06-02 NOTE — PROGRESS NOTES
Oral Chemotherapy Teaching      Patient Name/:  Marylu Georges   1960  Oral Chemotherapy Regimen:  Kisqali 600 mg once daily   Date Started Medication: Upon delivery    Initial Teaching Follow Up Comments     Safety     Storage instructions (away from children; away from heat/cold, sunlight, or moisture), handling - use of gloves (caregivers), washing hands after touching pills, managing waste     “How are you storing your medications?”, reminders on storage, proper handling (caregivers using gloves, washing hands, away from children, managing waste, etc.), disposal of medication with D/C or dosage change    The patient was counseled to store this medication in a cool dry place out of the reach of children and pets. She was informed to handle this medication with care and wash hands before and after use. I advised her to encourage the use of gloves if anyone else were to handle her medication.      Adherence      patient and/or caregiver on how to take medication, take with/without food, assess their adherence potential, stress importance of adherence, ways to manage adherence (pill boxes, phone reminders, calendars), what to do if miss a dose   “How are you taking your medication?” “How are you remembering to take your medication?”, “How many doses have you missed?”, determine reasons for non-adherence (not remembering, side effects, etc), ways to improve, overadherence? Remind patient of ways to improve/maintain adherence   This patient was counseled to take Kisqali 600 mg once daily for  days (3 weeks on and 1 week off). She was informed that missed doses can be taken within a few hours, but to wait until the next scheduled dose if a longer amount of time has elapsed. She was also informed about the interaction with grapefruit/pomegranite juice and advised to avoid those items.      Side Effects/Adverse Reactions      patient on potential side effects, s/s, ways to manage, when to  call MD/seek help     Determine if patient experiencing side effects, ways to manage  I counseled this patient on the potential side effects and management strategies. She was counseled about the potential for decreased white blood cell counts and advised to maintain appropriate hygiene. She was also informed to call the office if she experienced any signs of infection which were described to her. She was also counseled about the possibility for decreased hemoglobin and to maintain an adequate sleep schedule to avoid fatigue. She was informed to call the office if she were to experience significant shortness of breath, dizziness, or palpitations. She was counseled about the potential for nausea/vomiting. She was told to avoid things that would normally trigger nausea and to use Zofran as needed. Changes in liver function were discussed and the patient was informed to monitor for dark urine, or features of jaundice. We discussed the possibility for diarrhea and to avoid triggering foods and manage with loperamide as needed. We also discussed the possibility of hair loss and she was told that this symptom is present in 19-33% of patients and that it will grow back following treatment. Lastly, we discussed the possibility for abnormal heart activity and she voiced understanding that she would be receiving periodic EKG monitoring.      Miscellaneous     Food interactions, DDIs, financial issues Determine if patient started any new medications since being placed on oral chemo (analyze for DDI) The patient is awaiting a call from [facility] regarding delivery. No DDIs were noted with her updated medications. All new medications will be added to her profile.      Additional Notes:    Patient struggles with regular vertigo, nausea, and fatigue. She was informed to note any significant changes in these symptoms and report them to the office.     The patient already has Zofran and loperamide at home as needed for side  effects. She was advised to check the dates on these prescriptions and call in refills for any out of date medications.     The patient has new additions to her medication profile:   The following are new medications: Imodium PRN, flo seltzer plus daytime cold once daily, Vitamin D 1500 IU daily, magnesium 1500 mg daily, tart cherry juice extract, chromium picolinate (unknown dose), tylenol 650 mg three times daily)    Of note, patient is due for a hip replacement on July 20th.     Patient verbalized understanding of counseling today. All consent forms were signed.     Pharmacy will continue to follow.     Geovanna Masterson, Pharmacy Intern

## 2021-06-02 NOTE — PROGRESS NOTES
MTM encounter re initiation of Kisqali    Ms Moyer stated during her education session that she had not received a delivery.  Upon a call again this week to [employer] ( spoke with Ilya) and he stated she had entered a jannet period with her insurance, which means that she has missed an insurance payment.  The pharmacy has also run the wrong NDC number for a claim despite the NDC which insurance required being added as a note to pharmacy.  The [employer] rep , Ilya, is running the claim again with the proper NDC.  I have called Ms Georges and informed her of the missed insurance payment with a number for [employer] at [phone] so that she can clear up any payment issues. In the meantime, the [facility] office will check inventory of Kisqali with [facility]

## 2021-06-07 ENCOUNTER — MEDICATION THERAPY MANAGEMENT (OUTPATIENT)
Dept: PHARMACY | Facility: HOSPITAL | Age: 61
End: 2021-06-07

## 2021-06-07 ENCOUNTER — TELEPHONE (OUTPATIENT)
Dept: ONCOLOGY | Facility: CLINIC | Age: 61
End: 2021-06-07

## 2021-06-07 NOTE — TELEPHONE ENCOUNTER
DELETE AFTER REVIEWING: Telephone encounter to be sent to the clinical pool     Caller: MELISSA    Relationship: [facility]    Best call back number: [phone]    What medications are you currently taking: RIBOCICLIB 200-MG. 3 TABLETS DAILY     Who prescribed you this medication: JOSUE    What are your concerns: PER MELISSA, PHARMACIST NEEDS THE FOLLOWING INFO IN ORDER TO FILL THIS RX: DIAGNOSIS CODE & CURRENT MEDICATION LIST.  IS PATIENT TAKING AROMATASE INHIBITOR?     PLEASE CONTACT MELISSA BACK AT NUMBER LISTED ABOVE.  HER FAX #: [phone]

## 2021-06-07 NOTE — PROGRESS NOTES
MTM telephone encounter re delivery of Kisqali    Ms Parth states that delivery has been scheduled with [facility] for tomorrow-6/8/21.  She was instructed to notify us if that delivery failed- she verbalizes understanding.

## 2021-06-07 NOTE — PROGRESS NOTES
MTM telephone encounter re initiation of Kisqali    The pharmacist at [facility] has asked for updated ICD 10 code and for an updated medication list, as well as asking if patient is on letrozole.  She also requested a medication allergy list.  These questions were answered to her satisfaction, and she stated patient will receive her delivery tomorrow.

## 2021-06-08 ENCOUNTER — APPOINTMENT (OUTPATIENT)
Dept: RADIATION ONCOLOGY | Facility: HOSPITAL | Age: 61
End: 2021-06-08

## 2021-06-08 ENCOUNTER — TELEPHONE (OUTPATIENT)
Dept: CARDIOLOGY | Facility: CLINIC | Age: 61
End: 2021-06-08

## 2021-06-08 ENCOUNTER — CONSULT (OUTPATIENT)
Dept: RADIATION ONCOLOGY | Facility: HOSPITAL | Age: 61
End: 2021-06-08

## 2021-06-08 ENCOUNTER — OFFICE VISIT (OUTPATIENT)
Dept: CARDIOLOGY | Facility: CLINIC | Age: 61
End: 2021-06-08

## 2021-06-08 VITALS
WEIGHT: 161 LBS | HEART RATE: 62 BPM | BODY MASS INDEX: 26.82 KG/M2 | SYSTOLIC BLOOD PRESSURE: 140 MMHG | DIASTOLIC BLOOD PRESSURE: 82 MMHG | HEIGHT: 65 IN

## 2021-06-08 VITALS
TEMPERATURE: 97.3 F | WEIGHT: 161 LBS | DIASTOLIC BLOOD PRESSURE: 91 MMHG | BODY MASS INDEX: 26.79 KG/M2 | OXYGEN SATURATION: 97 % | SYSTOLIC BLOOD PRESSURE: 129 MMHG | HEART RATE: 52 BPM

## 2021-06-08 DIAGNOSIS — C50.811 MALIGNANT NEOPLASM OF OVERLAPPING SITES OF BOTH BREASTS IN FEMALE, ESTROGEN RECEPTOR POSITIVE (HCC): Primary | ICD-10-CM

## 2021-06-08 DIAGNOSIS — Z17.0 MALIGNANT NEOPLASM OF OVERLAPPING SITES OF BOTH BREASTS IN FEMALE, ESTROGEN RECEPTOR POSITIVE (HCC): Primary | ICD-10-CM

## 2021-06-08 DIAGNOSIS — C50.812 MALIGNANT NEOPLASM OF OVERLAPPING SITES OF BOTH BREASTS IN FEMALE, ESTROGEN RECEPTOR POSITIVE (HCC): Primary | ICD-10-CM

## 2021-06-08 DIAGNOSIS — I51.89 CARDIAC MASS: Primary | ICD-10-CM

## 2021-06-08 PROCEDURE — 99243 OFF/OP CNSLTJ NEW/EST LOW 30: CPT | Performed by: RADIOLOGY

## 2021-06-08 PROCEDURE — 93000 ELECTROCARDIOGRAM COMPLETE: CPT | Performed by: INTERNAL MEDICINE

## 2021-06-08 PROCEDURE — 99214 OFFICE O/P EST MOD 30 MIN: CPT | Performed by: INTERNAL MEDICINE

## 2021-06-08 PROCEDURE — 77263 THER RADIOLOGY TX PLNG CPLX: CPT | Performed by: RADIOLOGY

## 2021-06-08 RX ORDER — ASPIRIN 81 MG/1
81 TABLET, CHEWABLE ORAL NIGHTLY
COMMUNITY
End: 2021-07-21 | Stop reason: HOSPADM

## 2021-06-08 NOTE — TELEPHONE ENCOUNTER
Would like to have fasting lipid rechecked and see if she has any labs coming up in the near future which she could do as a fasting test.  I can send in a order for this if it is done through the [facility] lab.  In addition please remind her to get an automated blood pressure cuff to start checking blood pressure intermittently at home

## 2021-06-08 NOTE — PROGRESS NOTES
Date of Office Visit: 2021  Encounter Provider: Danni Odell MD  Place of Service: [facility] CARDIOLOGY  Patient Name: Marylu Georges  :1960    Chief complaint  Consult quested by Dr. Constantino and Dr. Bernardo    History of Present Illness  Patient is a 61-year-old female with history of hypertension prior stroke, prior left-sided breast cancer.  She had a very large mass that was resected subsequently underwent therapy with AC (total of 243 mg/m² Adriamycin) which was completed on 2019.  Then treated with Taxol for 12 weeks.  She subsequently underwent bilateral mastectomy 2019 with axillary node dissection.  Around that time she had an episode of syncope and had an echocardiogram that showed an ejection fraction of 60% with moderately dilated left atrium and negative saline injection.  I do not see any strain measurements.  Stress perfusion study was negative for ischemia.  Monitor showed few episodes of atrial tachycardia that were quite brief.  There is no residual malignancy,  She then received radiation therapy to bilateral chest 10/19-.  She has been on Femara since 2019.  Recent follow-up evaluation included CT imaging with radiology report mentioning cardiophrenic angle lymphadenopathy though not appreciated by Dr. Constantino.  Her CA 15-3 also slightly elevated questioning if this is a recurrence and subsequent PET scan is felt to have metastatic disease behind the chest wall on the left side which is solitary small, confined asymptomatic.  It may be contiguous with the lower aspect of the heart.  She is to start chemotherapy with Kisqali.  She is to see radiation oncologist for additional radiotherapy later today (Dr. Marmolejo). In the meanwhile she is also wishing to have right hip arthroplasty due to progressive hip pain and is scheduled for right hip arthroplasty on 2025    She denies any chest pain, shortness of breath, palpitations syncope near syncope  but she has occasional edema of her hands.  She remains fairly active and does exercise.  EKG at baseline on 5/24/2021 showed sinus rhythm with nonspecific ST-T wave changes and a QTc interval of 430 ms.  EKG with sinus rhythm nonspecific ST-T wave changes that are slightly more pronounced in the inferolateral leads than prior study of 2018.  QTC interval today is 423 ms.She has not started Kisqali    Past Medical History:   Diagnosis Date   • Anemia in neoplastic disease    • Arthritis    • Breast cancer (CMS/HCC)     Left   • CVA (cerebral vascular accident) (CMS/HCC)    • Hip pain     RIGHT HIP... CYST   • History of fracture of leg 1987   • History of radiation therapy     LAST TREATMENT     • Hypertension    • Limited joint range of motion (ROM)     RIGHT HIP   • Skin sore     OPEN SORE LEFT BREAST   • Syncope    • Vertigo      Past Surgical History:   Procedure Laterality Date   • AXILLARY LYMPH NODE BIOPSY/EXCISION Right     LYMPH NODE UNDER RIGHT ARM-MALIGNANT (DOUBLE MASTECTOMY)   • BREAST BIOPSY Left     MALIGNANT   • FRACTURE SURGERY  1987    Leg   • HARDWARE REMOVAL     • MASTECTOMY W/ SENTINEL NODE BIOPSY Bilateral 9/16/2019    Procedure: BILATERLA MODIFIED RADICAL MASTECTOMY WITH BILATERAL SENTINEL LYMPH NODE BIOPSY;  Surgeon: Joby Barron Jr., MD;  Location: [facility];  Service: General   • TOTAL HIP ARTHROPLASTY Right 3/2/2020    Procedure: RIGHT TOTAL HIP ARTHROPLASTY NATALY NAVIGATION;  Surgeon: Luis M Leonard MD;  Location: [facility];  Service: Orthopedics;  Laterality: Right;   • VENOUS ACCESS DEVICE (PORT) INSERTION Right 2/1/2019    Procedure: INSERTION VENOUS ACCESS DEVICE;  Surgeon: Joby Barron Jr., MD;  Location: [facility];  Service: General   • VENOUS ACCESS DEVICE (PORT) REMOVAL N/A 10/30/2019    Procedure: Mediport Removal;  Surgeon: Joby Barron Jr., MD;  Location: [facility];  Service: General     Outpatient Medications Prior to Visit   Medication  "Sig Dispense Refill   • acetaminophen (TYLENOL) 650 MG 8 hr tablet Take 1,950 mg by mouth Daily.     • aspirin 81 MG chewable tablet Chew 81 mg Daily.     • Cholecalciferol 250 MCG (50543 UT) tablet Take 1 tablet by mouth. Patient stated dose as \"1500 mg\"     • Chromium 200 MCG tablet Take 1 tablet by mouth. Patient was unable to state dose     • diclofenac (VOLTAREN) 75 MG EC tablet Take 1 tablet by mouth 2 (Two) Times a Day. 60 tablet 3   • DM-Phenylephrine-Acetaminophen (UNA-SELTZER PLUS DAY COLD/FLU PO) Take 1 dose by mouth Daily.     • letrozole (FEMARA) 2.5 MG tablet Take 1 tablet by mouth Daily. 90 tablet 2   • loperamide (IMODIUM) 2 MG capsule Take 2 mg by mouth 4 (Four) Times a Day As Needed for Diarrhea.     • magnesium oxide (MAG-OX) 400 MG tablet Take 400 mg by mouth 2 (two) times a day. Patient stated dose as \"1500mg\"     • meclizine (ANTIVERT) 25 MG tablet Take 1 tablet by mouth 3 (Three) Times a Day As Needed for Dizziness. 90 tablet 0   • metoprolol succinate XL (TOPROL-XL) 25 MG 24 hr tablet TAKE 1 TABLET BY MOUTH EVERY DAY 30 tablet 6   • ondansetron (ZOFRAN) 4 MG tablet Take 1 tablet by mouth Every 8 (Eight) Hours As Needed for Nausea or Vomiting. 90 tablet 0   • ribociclib succinate 200 MG tablet therapy pack tablet Take 3 tablets by mouth Daily for 21 days. Then 7 days off 63 tablet 0   • ribociclib succinate 200 MG tablet therapy pack tablet Take 3 tablets by mouth Daily. For 21 days and then 7 days off 63 tablet 0     No facility-administered medications prior to visit.       Allergies as of 06/08/2021 - Reviewed 06/08/2021   Allergen Reaction Noted   • Benadryl [diphenhydramine] Itching 02/18/2020   • Erythromycin GI Intolerance 01/17/2019   • Levaquin [levofloxacin] GI Intolerance 03/07/2019   • Penicillins GI Intolerance 01/17/2019     Social History     Socioeconomic History   • Marital status:      Spouse name: Cruz   • Number of children: 0   • Years of education: Not on file " "  • Highest education level: Not on file   Tobacco Use   • Smoking status: Never Smoker   • Smokeless tobacco: Never Used   Substance and Sexual Activity   • Alcohol use: Not Currently     Alcohol/week: 7.0 standard drinks     Types: 4 Glasses of wine, 3 Cans of beer per week     Comment: 2 CUPS DAILY   • Drug use: No   • Sexual activity: Not Currently     Partners: Male     Birth control/protection: Post-menopausal     Family History   Problem Relation Age of Onset   • Lung cancer Father    • Cancer Mother    • Breast cancer Mother    • Liver disease Mother    • Breast cancer Sister 48   • Breast cancer Maternal Grandmother    • Breast cancer Paternal Grandmother    • Breast cancer Maternal Uncle    • Malig Hyperthermia Neg Hx      Review of Systems   Constitutional: Negative for chills, fever, weight gain and weight loss.   Cardiovascular: Negative for leg swelling.   Respiratory: Negative for cough, snoring and wheezing.    Hematologic/Lymphatic: Negative for bleeding problem. Does not bruise/bleed easily.   Skin: Negative for color change.   Musculoskeletal: Positive for joint pain and myalgias. Negative for falls.   Gastrointestinal: Negative for melena.   Genitourinary: Negative for hematuria.   Neurological: Negative for excessive daytime sleepiness.   Psychiatric/Behavioral: Negative for depression. The patient is not nervous/anxious.         Objective:     Vitals:    06/08/21 1131 06/08/21 1132   BP: 138/80 140/82   BP Location: Right arm Left arm   Pulse: 62    Weight: 73 kg (161 lb)    Height: 165.1 cm (65\")      Body mass index is 26.79 kg/m².    Vitals reviewed.   Constitutional:       Appearance: Well-developed.   Eyes:      General: No scleral icterus.        Right eye: No discharge.      Conjunctiva/sclera: Conjunctivae normal.      Pupils: Pupils are equal, round, and reactive to light.   HENT:      Head: Normocephalic.      Nose: Nose normal.   Neck:      Thyroid: No thyromegaly.      Vascular: No " JVD.   Pulmonary:      Effort: Pulmonary effort is normal. No respiratory distress.      Breath sounds: Normal breath sounds. No wheezing. No rales.   Chest:      Comments: Bilateral mastectomy scars are present.  Cardiovascular:      Normal rate. Regular rhythm. Normal S1. Normal S2.      Murmurs: There is no murmur.      No gallop.   Pulses:     Intact distal pulses.   Edema:     Peripheral edema absent.   Abdominal:      General: Bowel sounds are normal. There is no distension.      Palpations: Abdomen is soft.      Tenderness: There is no abdominal tenderness. There is no rebound.   Musculoskeletal: Normal range of motion.         General: No tenderness.      Cervical back: Normal range of motion and neck supple. Skin:     General: Skin is warm and dry.      Findings: No erythema or rash.   Neurological:      Mental Status: Alert and oriented to person, place, and time.   Psychiatric:         Behavior: Behavior normal.         Thought Content: Thought content normal.         Judgment: Judgment normal.       Lab Review:     ECG 12 Lead    Date/Time: 6/8/2021 12:16 PM  Performed by: Danni Odell MD  Authorized by: Danni Odell MD   Comparison: compared with previous ECG   Similar to previous ECG  Rhythm: sinus rhythm  Other findings: non-specific ST-T wave changes  Other findings comments: QTc interval is 423 ms    Clinical impression: abnormal EKG          Assessment:       Diagnosis Plan   1. Cardiac mass  Adult Transthoracic Echo Complete W/ Cont if Necessary Per Protocol    ECG 12 Lead     Plan:       1.  Metastatic breast cancer.  We will get an echocardiogram at baseline in particular to assess the pericardiac mass and get baseline EF and strain imaging though clinically has done well following anthracycline therapy (total 243 mg/m²).  I cannot tell in review of these images if this involves the pericardium.  Await Dr. Marmolejo's recommendation for radiation therapy.  She is waiting on Kisqali to arrive to  start therapy.  Will need EKG monitoring 2 weeks after initiation of therapy as well as part a second dose.  Lots of already been addressed by Dr. Dixon  2.  Abnormal EKG.  She has ST-T wave changes on EKG but had a negative stress test 2019 and remains very active without any anginal symptoms.  I suspect these changes are more due to postoperative changes from mastectomy and post radiation.  I do not think she needs a stress test at this point though needs further risk factor modification for atherosclerotic disease burden.  3.  Hypertension blood pressure today slightly elevated and would like her to start monitoring this a bit more closely at home.  She is agreeable.  4.  Unknown lipid status.  Last lipid level and 2019 was normal.  Would like to have this rechecked in the near future with upcoming labs.  5.  Hip pain and arthritis.  She is scheduled for arthroplasty 6/21/2020.  Await echo results and will defer to Dr. Dixon if this is reasonable terms of timing from initiation of Kisqali.    Patient stop bang score is 2 and she will currently be in track 3 when she starts Kisqali but otherwise remain in track 1    Time Spent: I spent 55 minutes caring for Marylu on this date of service. This time includes time spent by me in the following activities: preparing for the visit, reviewing tests, obtaining and/or reviewing a separately obtained history, performing a medically appropriate examination and/or evaluation, counseling and educating the patient/family/caregiver, referring and communicating with other health care professionals and documenting information in the medical record.   I spent 1 minutes on the separately reported service of ECG. This time is not included in the time used to support the E/M service also reported today.     Your medication list          Accurate as of June 8, 2021 11:59 PM. If you have any questions, ask your nurse or doctor.            CONTINUE taking these medications       "Instructions Last Dose Given Next Dose Due   acetaminophen 650 MG 8 hr tablet  Commonly known as: TYLENOL      Take 1,950 mg by mouth Daily.       UNA-SELTZER PLUS DAY COLD/FLU PO      Take 1 dose by mouth Daily.       aspirin 81 MG chewable tablet      Chew 81 mg Daily.       Cholecalciferol 250 MCG (08288 UT) tablet      Take 1 tablet by mouth. Patient stated dose as \"1500 mg\"       Chromium 200 MCG tablet      Take 1 tablet by mouth. Patient was unable to state dose       diclofenac 75 MG EC tablet  Commonly known as: VOLTAREN      Take 1 tablet by mouth 2 (Two) Times a Day.       letrozole 2.5 MG tablet  Commonly known as: FEMARA      Take 1 tablet by mouth Daily.       loperamide 2 MG capsule  Commonly known as: IMODIUM      Take 2 mg by mouth 4 (Four) Times a Day As Needed for Diarrhea.       magnesium oxide 400 MG tablet  Commonly known as: MAG-OX      Take 400 mg by mouth 2 (two) times a day. Patient stated dose as \"1500mg\"       meclizine 25 MG tablet  Commonly known as: ANTIVERT      Take 1 tablet by mouth 3 (Three) Times a Day As Needed for Dizziness.       metoprolol succinate XL 25 MG 24 hr tablet  Commonly known as: TOPROL-XL      TAKE 1 TABLET BY MOUTH EVERY DAY       ondansetron 4 MG tablet  Commonly known as: ZOFRAN      Take 1 tablet by mouth Every 8 (Eight) Hours As Needed for Nausea or Vomiting.       ribociclib succinate 200 MG tablet therapy pack tablet  Commonly known as: Kisqali (600 MG Dose)      Take 3 tablets by mouth Daily for 21 days. Then 7 days off       ribociclib succinate 200 MG tablet therapy pack tablet  Commonly known as: Kisqali (600 MG Dose)      Take 3 tablets by mouth Daily. For 21 days and then 7 days off              Time Spent: I spent 50 minutes caring for Marylu on this date of service. This time includes time spent by me in the following activities: preparing for the visit, reviewing tests, obtaining and/or reviewing a separately obtained history, performing a medically " appropriate examination and/or evaluation, counseling and educating the patient/family/caregiver, ordering medications, tests, or procedures and documenting information in the medical record.   I spent 1 minutes on the separately reported service of ECG. This time is not included in the time used to support the E/M service also reported today.     Dictated utilizing Dragon dictation

## 2021-06-09 ENCOUNTER — HOSPITAL ENCOUNTER (OUTPATIENT)
Dept: CARDIOLOGY | Facility: HOSPITAL | Age: 61
Discharge: HOME | End: 2021-06-09
Admitting: INTERNAL MEDICINE

## 2021-06-09 DIAGNOSIS — I51.89 CARDIAC MASS: ICD-10-CM

## 2021-06-09 PROCEDURE — 93306 TTE W/DOPPLER COMPLETE: CPT | Performed by: INTERNAL MEDICINE

## 2021-06-09 PROCEDURE — 93356 MYOCRD STRAIN IMG SPCKL TRCK: CPT

## 2021-06-09 PROCEDURE — 93306 TTE W/DOPPLER COMPLETE: CPT

## 2021-06-09 PROCEDURE — 93356 MYOCRD STRAIN IMG SPCKL TRCK: CPT | Performed by: INTERNAL MEDICINE

## 2021-06-09 PROCEDURE — 25010000002 PERFLUTREN (DEFINITY) 8.476 MG IN SODIUM CHLORIDE (PF) 0.9 % 10 ML INJECTION: Performed by: INTERNAL MEDICINE

## 2021-06-09 RX ADMIN — PERFLUTREN 1.5 ML: 6.52 INJECTION, SUSPENSION INTRAVENOUS at 11:17

## 2021-06-09 NOTE — PROGRESS NOTES
Subjective     Elie Dixon MD    No diagnosis found.    Chief complaint: New hypermetabolic mass in the right cardiophrenic fat pad, in a patient with previously treated bilateral breast cancer.                         I am seeing the patient today at the request of Dr. Elie Dixon of the [facility] to evaluate the patient for radiation therapy to this new solitary cardiophrenic fat pad nodule.              Ms. Georges  is a very pleasant 59-year-old white female who presented in early 2019 with a neglected breast cancer on the left measuring approximately 12.3 cm which had  been enlarging  over the past 3 or 4 years and more recently with ulceration and bleeding in the past 2 to 3 months.  She also described a large right axillary mass.  CT scan of the chest on 1/24/2019 describes a large lobulated mass within the   the left breast measuring 12.3 x 4.0 x 8.8 cm.  Posteriorly the tumor abuts and likely infiltrates the chest wall musculature.  It extends into the skin anteriorly, with multiple additional small enhancing tumor nodules.  On the opposite, right side, there are multiple small enhancing nodules scattered throughout the right breast and multiple enlarged bilateral axillary lymph nodes, the largest largest node on the left measured 4 cm, and the largest node on the right measured 5.2 cm -  no definite internal mammary chain lymphadenopathy was identified.  There was no mediastinal or hilar lymphadenopathy.  There was noted a solitary punctate noncalcified nodule in the lateral aspect of the left lower lobe measuring 2 to 3 mm.  MRI of the brain and bone scan were both negative.      She received neoadjuvant AC x4 and Taxol x12 under the direction of Dr. Dixon at the [facility] and had an excellent response.  On 9/16/2019 she underwent bilateral mastectomies without reconstruction, with Dr. Robby Barron, and at pathology the right breast was negative without residual disease, the left breast had  multiple sites of residual disease, the largest measuring approximately 3.5 cm.  The left axilla had 5 of 5 of 5 lymph nodes positive, and the right right axilla had 5 of 8 lymph nodes  positive.  Extracapsular extension was present. Her estrogen receptors were positive, progestin receptors negative and HER-2/maria eugenia negative.                               She received definitive postoperative radiation therapy to both chest walls, both supraclavicular fossae, with postaxillary boosts, and left incision boost, between the dates 11/31/19 and 1/13/2020.   Overall she tolerated an aggressive course of therapy fairly well with some skin issues which required 2 treatment interruptions.  She was treated with Femara managed by Dr. Dixon.    Recently her CA 15-3 had risen slightly and CT scan of the chest abdomen pelvis were obtained.  That study demonstrated a mass in the right cardiophrenic fat pad.  Additional imaging included a PET scan on 5/19/2021.  The PET scan noted a 26 x 10 mm hypermetabolic node in the right cardiophrenic fat pad  with an SUV of 6.8.  There was also described on that study  a 70 x 48 mm unilocular cyst in the left adnexa.  The remainder of the study was negative.    We are asked by Dr. Dixon to evaluate the patient for possible palliative radiation therapy to this hypermetabolic nodule.        Review of Systems   Constitutional: Negative.    Respiratory: Negative.    Cardiovascular: Negative.    Gastrointestinal: Negative.    Genitourinary: Negative.    Musculoskeletal: Positive for arthralgias, gait problem and myalgias.         Past Medical History:   Diagnosis Date   • Anemia in neoplastic disease    • Arthritis    • Breast cancer (CMS/HCC)     Left   • CVA (cerebral vascular accident) (CMS/HCC)    • Hip pain     RIGHT HIP... CYST   • History of fracture of leg 1987   • History of radiation therapy     LAST TREATMENT     • Hypertension    • Limited joint range of motion (ROM)     RIGHT  HIP   • Skin sore     OPEN SORE LEFT BREAST   • Syncope    • Vertigo          Past Surgical History:   Procedure Laterality Date   • AXILLARY LYMPH NODE BIOPSY/EXCISION Right     LYMPH NODE UNDER RIGHT ARM-MALIGNANT (DOUBLE MASTECTOMY)   • BREAST BIOPSY Left     MALIGNANT   • FRACTURE SURGERY  1987    Leg   • HARDWARE REMOVAL     • MASTECTOMY W/ SENTINEL NODE BIOPSY Bilateral 9/16/2019    Procedure: BILATERLA MODIFIED RADICAL MASTECTOMY WITH BILATERAL SENTINEL LYMPH NODE BIOPSY;  Surgeon: Joby Barron Jr., MD;  Location: [facility];  Service: General   • TOTAL HIP ARTHROPLASTY Right 3/2/2020    Procedure: RIGHT TOTAL HIP ARTHROPLASTY NATALY NAVIGATION;  Surgeon: Luis M Leonard MD;  Location: [facility];  Service: Orthopedics;  Laterality: Right;   • VENOUS ACCESS DEVICE (PORT) INSERTION Right 2/1/2019    Procedure: INSERTION VENOUS ACCESS DEVICE;  Surgeon: Joby Barron Jr., MD;  Location: [facility];  Service: General   • VENOUS ACCESS DEVICE (PORT) REMOVAL N/A 10/30/2019    Procedure: Mediport Removal;  Surgeon: Joby Barron Jr., MD;  Location: [facility];  Service: General         Social History     Socioeconomic History   • Marital status:      Spouse name: Cruz   • Number of children: 0   • Years of education: Not on file   • Highest education level: Not on file   Tobacco Use   • Smoking status: Never Smoker   • Smokeless tobacco: Never Used   Substance and Sexual Activity   • Alcohol use: Not Currently     Alcohol/week: 7.0 standard drinks     Types: 4 Glasses of wine, 3 Cans of beer per week     Comment: 2 CUPS DAILY   • Drug use: No   • Sexual activity: Not Currently     Partners: Male     Birth control/protection: Post-menopausal         Family History   Problem Relation Age of Onset   • Lung cancer Father    • Cancer Mother    • Breast cancer Mother    • Liver disease Mother    • Breast cancer Sister 48   • Breast cancer Maternal Grandmother    • Breast cancer Paternal  "Grandmother    • Breast cancer Maternal Uncle    • Malig Hyperthermia Neg Hx           Objective    Physical Exam   Awake, alert, spontaneous, no apparent distress.  Antalgic gait.    Current Outpatient Medications on File Prior to Visit   Medication Sig Dispense Refill   • acetaminophen (TYLENOL) 650 MG 8 hr tablet Take 1,950 mg by mouth Daily.     • aspirin 81 MG chewable tablet Chew 81 mg Daily.     • Cholecalciferol 250 MCG (09417 UT) tablet Take 1 tablet by mouth. Patient stated dose as \"1500 mg\"     • Chromium 200 MCG tablet Take 1 tablet by mouth. Patient was unable to state dose     • diclofenac (VOLTAREN) 75 MG EC tablet Take 1 tablet by mouth 2 (Two) Times a Day. 60 tablet 3   • DM-Phenylephrine-Acetaminophen (UNA-SELTZER PLUS DAY COLD/FLU PO) Take 1 dose by mouth Daily.     • letrozole (FEMARA) 2.5 MG tablet Take 1 tablet by mouth Daily. 90 tablet 2   • loperamide (IMODIUM) 2 MG capsule Take 2 mg by mouth 4 (Four) Times a Day As Needed for Diarrhea.     • magnesium oxide (MAG-OX) 400 MG tablet Take 400 mg by mouth 2 (two) times a day. Patient stated dose as \"1500mg\"     • meclizine (ANTIVERT) 25 MG tablet Take 1 tablet by mouth 3 (Three) Times a Day As Needed for Dizziness. 90 tablet 0   • metoprolol succinate XL (TOPROL-XL) 25 MG 24 hr tablet TAKE 1 TABLET BY MOUTH EVERY DAY 30 tablet 6   • ondansetron (ZOFRAN) 4 MG tablet Take 1 tablet by mouth Every 8 (Eight) Hours As Needed for Nausea or Vomiting. 90 tablet 0   • ribociclib succinate 200 MG tablet therapy pack tablet Take 3 tablets by mouth Daily for 21 days. Then 7 days off 63 tablet 0   • ribociclib succinate 200 MG tablet therapy pack tablet Take 3 tablets by mouth Daily. For 21 days and then 7 days off 63 tablet 0     No current facility-administered medications on file prior to visit.       ALLERGIES:    Allergies   Allergen Reactions   • Benadryl [Diphenhydramine] Itching     INCREASES BLOOD PRESSURE   • Erythromycin GI Intolerance   • Levaquin " [Levofloxacin] GI Intolerance   • Penicillins GI Intolerance       /91   Pulse 52   Temp 97.3 °F (36.3 °C) (Temporal)   Wt 73 kg (161 lb)   LMP  (LMP Unknown)   SpO2 97%   BMI 26.79 kg/m²      Current Status 5/24/2021   ECOG score 0         Assessment/Plan    New hypermetabolic mass in the right cardiophrenic fat pad, in a patient with previously treated bilateral breast cancer.                         We will evaluate her for SBRT to this hypermetabolic nodule, and dose will have to follow planning.  This is very close to the inferior border of her previous left chest wall portal, and that may affect treatment planning.  It is also very close to the lower heart border.  Cardiac constraints will have to be followed carefully, with SBRT versus conventional palliative fractionation.  I went over these issues with the patient in detail and she voiced understanding.  We have her scheduled for immobilization and CT simulation to start the planning process in our department on Friday 6/11 at 1 PM.  She had many good questions which I believe were answered to her satisfaction and informed consent was obtained.      [facility] ECOG Status: (1) Restricted in physically strenuous activity, ambulatory and able to do work of light nature        I spent greater than 40 minutes in face-to-face time with the patient and 40 minutes that time was spent in counseling and coordination of care to include discussion of indications, goals, logistics, alternatives, and risks both common and rare, as well as surveillance and potential outcomes.        Pedro Marmolejo MD

## 2021-06-10 ENCOUNTER — TELEPHONE (OUTPATIENT)
Dept: CARDIOLOGY | Facility: CLINIC | Age: 61
End: 2021-06-10

## 2021-06-10 LAB
ASCENDING AORTA: 3.3 CM
BH CV ECHO MEAS - ACS: 2.2 CM
BH CV ECHO MEAS - AO ARCH DIAM (PROXIMAL TRANS.): 3.2 CM
BH CV ECHO MEAS - AO MAX PG (FULL): 1.7 MMHG
BH CV ECHO MEAS - AO MAX PG: 7.5 MMHG
BH CV ECHO MEAS - AO MEAN PG (FULL): 1.6 MMHG
BH CV ECHO MEAS - AO MEAN PG: 4.5 MMHG
BH CV ECHO MEAS - AO ROOT AREA (BSA CORRECTED): 1.4
BH CV ECHO MEAS - AO ROOT AREA: 5 CM^2
BH CV ECHO MEAS - AO ROOT DIAM: 2.5 CM
BH CV ECHO MEAS - AO V2 MAX: 136.8 CM/SEC
BH CV ECHO MEAS - AO V2 MEAN: 100.6 CM/SEC
BH CV ECHO MEAS - AO V2 VTI: 28.4 CM
BH CV ECHO MEAS - ASC AORTA: 3.3 CM
BH CV ECHO MEAS - AVA(I,A): 2.5 CM^2
BH CV ECHO MEAS - AVA(I,D): 2.5 CM^2
BH CV ECHO MEAS - AVA(V,A): 2.6 CM^2
BH CV ECHO MEAS - AVA(V,D): 2.6 CM^2
BH CV ECHO MEAS - BSA(HAYCOCK): 1.8 M^2
BH CV ECHO MEAS - BSA: 1.8 M^2
BH CV ECHO MEAS - BZI_BMI: 26.8 KILOGRAMS/M^2
BH CV ECHO MEAS - BZI_METRIC_HEIGHT: 165.1 CM
BH CV ECHO MEAS - BZI_METRIC_WEIGHT: 73 KG
BH CV ECHO MEAS - EDV(MOD-SP2): 114 ML
BH CV ECHO MEAS - EDV(MOD-SP4): 110 ML
BH CV ECHO MEAS - EDV(TEICH): 112.5 ML
BH CV ECHO MEAS - EF(CUBED): 77.6 %
BH CV ECHO MEAS - EF(MOD-BP): 58.8 %
BH CV ECHO MEAS - EF(MOD-SP2): 57.9 %
BH CV ECHO MEAS - EF(MOD-SP4): 59.1 %
BH CV ECHO MEAS - EF(TEICH): 69.6 %
BH CV ECHO MEAS - EF_3D-VOL: 57 %
BH CV ECHO MEAS - ESV(MOD-SP2): 48 ML
BH CV ECHO MEAS - ESV(MOD-SP4): 45 ML
BH CV ECHO MEAS - ESV(TEICH): 34.1 ML
BH CV ECHO MEAS - FS: 39.3 %
BH CV ECHO MEAS - IVS/LVPW: 1.1
BH CV ECHO MEAS - IVSD: 0.97 CM
BH CV ECHO MEAS - LA 3D VOL INDEX: 43
BH CV ECHO MEAS - LAT PEAK E' VEL: 9.2 CM/SEC
BH CV ECHO MEAS - LV DIASTOLIC VOL/BSA (35-75): 61 ML/M^2
BH CV ECHO MEAS - LV MASS(C)D: 158.6 GRAMS
BH CV ECHO MEAS - LV MASS(C)DI: 87.9 GRAMS/M^2
BH CV ECHO MEAS - LV MAX PG: 5.8 MMHG
BH CV ECHO MEAS - LV MEAN PG: 2.9 MMHG
BH CV ECHO MEAS - LV SYSTOLIC VOL/BSA (12-30): 24.9 ML/M^2
BH CV ECHO MEAS - LV V1 MAX: 120.4 CM/SEC
BH CV ECHO MEAS - LV V1 MEAN: 78.6 CM/SEC
BH CV ECHO MEAS - LV V1 VTI: 24.1 CM
BH CV ECHO MEAS - LVIDD: 4.9 CM
BH CV ECHO MEAS - LVIDS: 3 CM
BH CV ECHO MEAS - LVLD AP2: 7.1 CM
BH CV ECHO MEAS - LVLD AP4: 6.9 CM
BH CV ECHO MEAS - LVLS AP2: 5.5 CM
BH CV ECHO MEAS - LVLS AP4: 5.6 CM
BH CV ECHO MEAS - LVOT AREA (M): 3.1 CM^2
BH CV ECHO MEAS - LVOT AREA: 3 CM^2
BH CV ECHO MEAS - LVOT DIAM: 2 CM
BH CV ECHO MEAS - LVPWD: 0.88 CM
BH CV ECHO MEAS - MED PEAK E' VEL: 7.1 CM/SEC
BH CV ECHO MEAS - MV A DUR: 0.12 SEC
BH CV ECHO MEAS - MV A MAX VEL: 54 CM/SEC
BH CV ECHO MEAS - MV DEC SLOPE: 320.7 CM/SEC^2
BH CV ECHO MEAS - MV DEC TIME: 0.21 SEC
BH CV ECHO MEAS - MV E MAX VEL: 73.3 CM/SEC
BH CV ECHO MEAS - MV E/A: 1.4
BH CV ECHO MEAS - MV MAX PG: 2.7 MMHG
BH CV ECHO MEAS - MV MEAN PG: 1.3 MMHG
BH CV ECHO MEAS - MV P1/2T MAX VEL: 85.8 CM/SEC
BH CV ECHO MEAS - MV P1/2T: 78.3 MSEC
BH CV ECHO MEAS - MV V2 MAX: 81.4 CM/SEC
BH CV ECHO MEAS - MV V2 MEAN: 53.7 CM/SEC
BH CV ECHO MEAS - MV V2 VTI: 32.8 CM
BH CV ECHO MEAS - MVA P1/2T LCG: 2.6 CM^2
BH CV ECHO MEAS - MVA(P1/2T): 2.8 CM^2
BH CV ECHO MEAS - MVA(VTI): 2.2 CM^2
BH CV ECHO MEAS - PA MAX PG (FULL): 1.8 MMHG
BH CV ECHO MEAS - PA MAX PG: 2.8 MMHG
BH CV ECHO MEAS - PA V2 MAX: 83.4 CM/SEC
BH CV ECHO MEAS - PULM A REVS DUR: 0.11 SEC
BH CV ECHO MEAS - PULM A REVS VEL: 37.7 CM/SEC
BH CV ECHO MEAS - PULM DIAS VEL: 51 CM/SEC
BH CV ECHO MEAS - PULM S/D: 1.2
BH CV ECHO MEAS - PULM SYS VEL: 62.6 CM/SEC
BH CV ECHO MEAS - PVA(V,A): 3 CM^2
BH CV ECHO MEAS - PVA(V,D): 3 CM^2
BH CV ECHO MEAS - QP/QS: 0.86
BH CV ECHO MEAS - RAP SYSTOLE: 8 MMHG
BH CV ECHO MEAS - RV MAX PG: 0.99 MMHG
BH CV ECHO MEAS - RV MEAN PG: 0.72 MMHG
BH CV ECHO MEAS - RV V1 MAX: 49.7 CM/SEC
BH CV ECHO MEAS - RV V1 MEAN: 41.4 CM/SEC
BH CV ECHO MEAS - RV V1 VTI: 12.3 CM
BH CV ECHO MEAS - RVOT AREA: 5.1 CM^2
BH CV ECHO MEAS - RVOT DIAM: 2.5 CM
BH CV ECHO MEAS - RVSP: 19.2 MMHG
BH CV ECHO MEAS - SI(AO): 78.6 ML/M^2
BH CV ECHO MEAS - SI(CUBED): 50.4 ML/M^2
BH CV ECHO MEAS - SI(LVOT): 40.1 ML/M^2
BH CV ECHO MEAS - SI(MOD-SP2): 36.6 ML/M^2
BH CV ECHO MEAS - SI(MOD-SP4): 36 ML/M^2
BH CV ECHO MEAS - SI(TEICH): 43.4 ML/M^2
BH CV ECHO MEAS - SV(AO): 141.8 ML
BH CV ECHO MEAS - SV(CUBED): 91 ML
BH CV ECHO MEAS - SV(LVOT): 72.3 ML
BH CV ECHO MEAS - SV(MOD-SP2): 66 ML
BH CV ECHO MEAS - SV(MOD-SP4): 65 ML
BH CV ECHO MEAS - SV(RVOT): 61.9 ML
BH CV ECHO MEAS - SV(TEICH): 78.3 ML
BH CV ECHO MEAS - TAPSE (>1.6): 1.5 CM
BH CV ECHO MEAS - TR MAX VEL: 167.2 CM/SEC
BH CV ECHO MEASUREMENTS AVERAGE E/E' RATIO: 8.99
BH CV XLRA - RV BASE: 3.2 CM
BH CV XLRA - RV LENGTH: 6.3 CM
BH CV XLRA - RV MID: 2.7 CM
BH CV XLRA - TDI S': 13.9 CM/SEC
LEFT ATRIUM VOLUME INDEX: 33 ML/M2
LV EF 2D ECHO EST: 59 %
MAXIMAL PREDICTED HEART RATE: 157 BPM
SINUS: 3 CM
STJ: 3.4 CM
STRESS TARGET HR: 133 BPM

## 2021-06-10 NOTE — TELEPHONE ENCOUNTER
Triage RN---  Please let patient know that her baseline echo looks good.  Heart is pumping strong and relaxing well.  No significant valvular issues noted.  Dr. Odell would like to see her back in 3 months in the cardio oncology clinic or sooner if she has any issues or concerns.      Janell--- patient needs a 3-month cardio oncology follow-up with Dr. Odell in the office, per MD instructions.

## 2021-06-10 NOTE — TELEPHONE ENCOUNTER
Patient notified of results and verbalized understanding    Elizabeth, she is asking if you will send a clearance letter to Dr HEATHER Leonard for hip surgery  thanks  Lexis Tovar RN  Triage nurse

## 2021-06-10 NOTE — TELEPHONE ENCOUNTER
Dr. Odell--- is patient cleared from a cardiac standpoint to proceed with hip surgery?  Needs letter if so.

## 2021-06-11 NOTE — PROGRESS NOTES
Subjective     REASON FOR FOLLOW UP:    1. 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 R BREAST MASS WITH GIANT R AXILLARY ADENOPATHY.  3. FAMILY HISTORY OF BREAST CANCER IN MOTHER AND SISTER, SISTER WAS TESTED FOR BRCA AND WAS NEGATIVE.  4. LEFT OVARIAN CYST , IT HAS BEEN PRESENT FOR LONG TIME BUT IS GETTING LARGER, ASYMPTOMATIC, NEED FOR , PELVIC US AND GYN ONC EVEAL                 5. LEFT HIP PAIN SEVERE ARTHRITIS, REAL NEED FOR LEFT HIP REPLACEMENT.    History of Present Illness    The patient is a 61 y.o. female who comes into the office today having been on Kisquali for the past week. She was seen by Dr. Odell with repeat EKG and echocardiogram. Patient is still planning to have shoulder surgery in July and Dr. Odell was planning to discuss this with Dr. Dixon. She has been seen by radiation oncology with plans to start radiation in a week or so.    Since starting the Kisquali she denies any trouble with her bowels. She has had some nausea but states she has this on and off anyway so therefore takes the Kisquali in the evenings. She uses her ondansetron almost daily right now to help prevent nausea. She denies any new areas of pain. She has had no fevers.      Past Medical History:   Diagnosis Date   • Anemia in neoplastic disease    • Arthritis    • Breast cancer (CMS/HCC)     Left   • CVA (cerebral vascular accident) (CMS/HCC)    • Hip pain     RIGHT HIP... CYST   • History of fracture of leg 1987   • History of radiation therapy     LAST TREATMENT     • Hypertension    • Limited joint range of motion (ROM)     RIGHT HIP   • Skin sore     OPEN SORE LEFT BREAST   • Syncope    • Vertigo      Past Surgical History:   Procedure Laterality Date   • AXILLARY LYMPH NODE BIOPSY/EXCISION Right     LYMPH NODE UNDER RIGHT ARM-MALIGNANT (DOUBLE MASTECTOMY)   • BREAST BIOPSY Left     MALIGNANT   • FRACTURE SURGERY  1987    Leg   • HARDWARE REMOVAL     • MASTECTOMY W/ SENTINEL NODE  "BIOPSY Bilateral 9/16/2019    Procedure: BILATERLA MODIFIED RADICAL MASTECTOMY WITH BILATERAL SENTINEL LYMPH NODE BIOPSY;  Surgeon: Joby Barron Jr., MD;  Location: [facility] OR;  Service: General   • TOTAL HIP ARTHROPLASTY Right 3/2/2020    Procedure: RIGHT TOTAL HIP ARTHROPLASTY NATALY NAVIGATION;  Surgeon: Luis M Leonard MD;  Location: [facility] OR;  Service: Orthopedics;  Laterality: Right;   • VENOUS ACCESS DEVICE (PORT) INSERTION Right 2/1/2019    Procedure: INSERTION VENOUS ACCESS DEVICE;  Surgeon: Joby Barron Jr., MD;  Location: [facility] OR [facility];  Service: General   • VENOUS ACCESS DEVICE (PORT) REMOVAL N/A 10/30/2019    Procedure: Mediport Removal;  Surgeon: Joby Barron Jr., MD;  Location: [facility] OR;  Service: General        Current Outpatient Medications on File Prior to Visit   Medication Sig Dispense Refill   • acetaminophen (TYLENOL) 650 MG 8 hr tablet Take 1,950 mg by mouth Daily.     • aspirin 81 MG chewable tablet Chew 81 mg Daily.     • Cholecalciferol 250 MCG (82435 UT) tablet Take 1 tablet by mouth. Patient stated dose as \"1500 mg\"     • Chromium 200 MCG tablet Take 1 tablet by mouth. Patient was unable to state dose     • diclofenac (VOLTAREN) 75 MG EC tablet Take 1 tablet by mouth 2 (Two) Times a Day. 60 tablet 3   • DM-Phenylephrine-Acetaminophen (UNA-SELTZER PLUS DAY COLD/FLU PO) Take 1 dose by mouth Daily.     • letrozole (FEMARA) 2.5 MG tablet Take 1 tablet by mouth Daily. 90 tablet 2   • loperamide (IMODIUM) 2 MG capsule Take 2 mg by mouth 4 (Four) Times a Day As Needed for Diarrhea.     • magnesium oxide (MAG-OX) 400 MG tablet Take 400 mg by mouth 2 (two) times a day. Patient stated dose as \"1500mg\"     • meclizine (ANTIVERT) 25 MG tablet Take 1 tablet by mouth 3 (Three) Times a Day As Needed for Dizziness. 90 tablet 0   • metoprolol succinate XL (TOPROL-XL) 25 MG 24 hr tablet TAKE 1 TABLET BY MOUTH EVERY DAY 30 tablet 6   • ondansetron (ZOFRAN) 4 MG tablet Take 1 " tablet by mouth Every 8 (Eight) Hours As Needed for Nausea or Vomiting. 90 tablet 0   • ribociclib succinate 200 MG tablet therapy pack tablet Take 3 tablets by mouth Daily for 21 days. Then 7 days off 63 tablet 0   • ribociclib succinate 200 MG tablet therapy pack tablet Take 3 tablets by mouth Daily. For 21 days and then 7 days off 63 tablet 0     No current facility-administered medications on file prior to visit.        ALLERGIES:    Allergies   Allergen Reactions   • Benadryl [Diphenhydramine] Itching     INCREASES BLOOD PRESSURE   • Erythromycin GI Intolerance   • Levaquin [Levofloxacin] GI Intolerance   • Penicillins GI Intolerance        Social History     Socioeconomic History   • Marital status:      Spouse name: Cruz   • Number of children: 0   • Years of education: Not on file   • Highest education level: Not on file   Tobacco Use   • Smoking status: Never Smoker   • Smokeless tobacco: Never Used   Substance and Sexual Activity   • Alcohol use: Not Currently     Alcohol/week: 7.0 standard drinks     Types: 4 Glasses of wine, 3 Cans of beer per week     Comment: 2 CUPS DAILY   • Drug use: No   • Sexual activity: Not Currently     Partners: Male     Birth control/protection: Post-menopausal        Family History   Problem Relation Age of Onset   • Lung cancer Father    • Cancer Mother    • Breast cancer Mother    • Liver disease Mother    • Breast cancer Sister 48   • Breast cancer Maternal Grandmother    • Breast cancer Paternal Grandmother    • Breast cancer Maternal Uncle    • Malig Hyperthermia Neg Hx       Objective     There were no vitals filed for this visit.  Current Status 5/24/2021   ECOG score 0     PHYSICAL EXAM:   GENERAL:  Well-developed, well-nourished in no acute distress.   SKIN:  Warm, dry without rashes, purpura or petechiae.  HEAD:  Normocephalic.  EYES:  Pupils equal, round.  EOMs intact.  Conjunctivae normal.  EARS:  Hearing intact.  NOSE:  Septum midline.  No excoriations  or nasal discharge.  MOUTH:  Tongue is well-papillated; no stomatitis or ulcers.  Lips normal.  THROAT:  Oropharynx without lesions or exudates.  LYMPHATICS:  No cervical, supraclavicularl adenopathy.  CHEST:  Lungs clear to auscultation. Good airflow.  CARDIAC:  Regular rate and rhythm without murmurs. Normal S1,S2.  ABDOMEN:  Soft, nontender with no organomegaly or masses. Bowel sounds present  EXTREMITIES:  No clubbing, cyanosis or edema.  NEUROLOGICAL: No focal neurological deficits.  PSYCHIATRIC:  Normal affect and mood.        RECENT LABS:                Assessment/Plan    1.  History of least for the last 4 years, she has had an in crescendo mass in the left breast that has produced a gigantic tumor that WAS close to 25 cm in size and is replacing most of the anatomy of the left breast. There are areas of ulceration necrosis and bleeding and the mass is fixed to the chest wall. She has abundant amount of collateral circulation in the left anterior chest wall and it caught my attention the lack of any left axillary adenopathy. She has no lymphedema in the left upper extremity. In the right breast while in sitting position, no palpable abnormalities are documented. The right breast skin and nipple are normal. When the patient lies flat, there is a palpable mass at 9 o'clock from the nipple that measures probably 4 cm in size, very indistinct in regard to edges and margins. There was no mobility and no areas of tenderness. She has a giant adenopathy in the right axilla that measures close to 9 cm in size, uniform, no fluctuation, nontender, completely fixed to the axillary wall. There is no compromise of the skin.     · After completion of 4 cycles of AC patient has had very dramatic improvement in all sites of disease including right axilla and left breast.    · Completed 4 cycles Adriamycin Cytoxan on 4/12/2019.    · Patient started weekly Taxol treatments on 5/3/2019.  · Completed 12 weekly Taxol treatments  on 7/19/2019.  · 9/16/2019 bilateral mastectomy by Dr. Joby Barron with axillary lymph node dissection.  No residual malignancy.  · 10/30/2019 patient's Mediport was removed in anticipation of radiation.  · Radiation therapy under the care of Dr. Marmolejo 10/31/2019-1/13/2020 to the right chest wall.  · Started on adjuvant Femara 2.5 mg daily 8/20/2019.  · 9/21/2020 continues on adjuvant Femara, tolerating it quite well.  Some mild hot flashes and mild arthralgias, all which are tolerable.  I advised the patient that at this point her breast cancer remains in remission. She is 100% compliant of her medication letrozole and her clinical examination remains negative normal for breast cancer recurrence.   The patient was further reviewed on 04/16/2021. Her clinical examination is completely negative normal and her questioning is negative in regard to symptoms that would suggest breast cancer recurrence. She is 100% compliant with her Femara. Her white count, hemoglobin and platelets remain normal. Her tumor marker during the previous visit was normal and we have another one pending today CA 15-3.   The patient was further reviewed on 05/17/2021 along with her brother. The clinical examination has not changed. The only new complaint is the progressive amount of discomfort and the inability to function given the pain in the left hip area. Now she is limping. The only time when she is not in discomfort with the left hip is when she is sitting or lying in bed or riding the horse when gravity takes away the pressure of her left hip area. Radiologically speaking the CT scan of the chest, abdomen and pelvis to my eyes disclosed no abnormalities besides a very simple ovarian cyst that measures close to 7 x 5 cm with no trabeculation. There is no pelvic ascites. There is no pelvic adenopathy. The other abnormality obviously visible is the severe degree of scoliosis of the thoracic, lumbar spines.  subchondral cyst in the left  hip and the minimal if any space leftover between the head of the femur and the acetabulum. There is no metastasis in this anatomical site. The radiologist mentioned cardiophrenic angle lymphadenopathy. Pulmonary anatomy is normal. Hilar adenopathy is normal. No pericardial effusion. No pleural effusion. Minimal infiltrate in the lungs related to radiation changes. Liver anatomy, pancreas and kidneys were unremarkable. The scoliosis is very obvious in the view of the abdomen.   The patient returned to the office on 05/24/2021. Since the previous visit the patient proceeded with a PET scan and I have reviewed this with her in the PAC system showing chest wall on the left side area of enlightening SUV activity that is almost 3 cm long x 7 mm wide. It is behind the bone. It is very close to the lower aspect of her heart. The reset of the PET scan discloses no activity. This is also specific in regard to the ovaries and actually an ultrasound of her vagina looking into the ovaries documented an unilocular cyst on the left side with typical features of benignity and a  was completely normal 5.5. 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 The rest of the PET scan is very much negative normal. I would not be surprised if this is an area of the internal mammary node chain.   Obviously the ovarian cyst is benign only locular with a normal  and I find no reason to refer her to GYN oncology anymore.     Patient returns on 6- having started Kisquali on 6/8/2021 under the direction of Dr. Dixon. She has had some nausea that is controlled with ondansetron.  She denies bowel changes.  She is had no new pain.  She was seen by Dr. Odell in preparation for radiation oncology evaluation which she is already had as well.  Anticipate  initiating radiation in the next week or 2.    PLAN:    1. Continue letrozole 2.5 mg a day.  2. Continue Kisqali.  3. Return in 1 week for CBC, CMP, EKG, and nurse practitioner review.  4. The patient will require to have a repeat PET scan at least 6-8 weeks after completion of her radiation therapy to the chest wall.   5. Patient does have follow-up Dr. Dixon scheduled 7/7/2021.    Patient is on high risk medication requiring frequent monitoring.      Pamela Cowan, APRN  06/14/2021

## 2021-06-11 NOTE — TELEPHONE ENCOUNTER
I need to talk to Dr. Constantino and Dr. Marmolejo as I would think it is more prudent to complete chemo and radiation therapy prior to hip surgery.

## 2021-06-14 ENCOUNTER — OFFICE VISIT (OUTPATIENT)
Dept: ONCOLOGY | Facility: CLINIC | Age: 61
End: 2021-06-14

## 2021-06-14 ENCOUNTER — LAB (OUTPATIENT)
Dept: LAB | Facility: HOSPITAL | Age: 61
End: 2021-06-14

## 2021-06-14 VITALS
HEART RATE: 52 BPM | BODY MASS INDEX: 26.81 KG/M2 | TEMPERATURE: 97.7 F | DIASTOLIC BLOOD PRESSURE: 70 MMHG | WEIGHT: 160.9 LBS | OXYGEN SATURATION: 100 % | SYSTOLIC BLOOD PRESSURE: 112 MMHG | HEIGHT: 65 IN | RESPIRATION RATE: 18 BRPM

## 2021-06-14 DIAGNOSIS — Z17.0 MALIGNANT NEOPLASM OF OVERLAPPING SITES OF LEFT BREAST IN FEMALE, ESTROGEN RECEPTOR POSITIVE (HCC): ICD-10-CM

## 2021-06-14 DIAGNOSIS — C50.919 METASTATIC BREAST CANCER: ICD-10-CM

## 2021-06-14 DIAGNOSIS — Z17.0 MALIGNANT NEOPLASM OF OVERLAPPING SITES OF LEFT BREAST IN FEMALE, ESTROGEN RECEPTOR POSITIVE (HCC): Primary | ICD-10-CM

## 2021-06-14 DIAGNOSIS — Z79.899 HIGH RISK MEDICATION USE: ICD-10-CM

## 2021-06-14 DIAGNOSIS — C50.812 MALIGNANT NEOPLASM OF OVERLAPPING SITES OF LEFT BREAST IN FEMALE, ESTROGEN RECEPTOR POSITIVE (HCC): Primary | ICD-10-CM

## 2021-06-14 DIAGNOSIS — C50.812 MALIGNANT NEOPLASM OF OVERLAPPING SITES OF LEFT BREAST IN FEMALE, ESTROGEN RECEPTOR POSITIVE (HCC): ICD-10-CM

## 2021-06-14 LAB
ALBUMIN SERPL-MCNC: 4.5 G/DL (ref 3.5–5.2)
ALBUMIN/GLOB SERPL: 2 G/DL (ref 1.1–2.4)
ALP SERPL-CCNC: 80 U/L (ref 38–116)
ALT SERPL W P-5'-P-CCNC: 16 U/L (ref 0–33)
ANION GAP SERPL CALCULATED.3IONS-SCNC: 10.2 MMOL/L (ref 5–15)
AST SERPL-CCNC: 22 U/L (ref 0–32)
BASOPHILS # BLD AUTO: 0.03 10*3/MM3 (ref 0–0.2)
BASOPHILS NFR BLD AUTO: 0.7 % (ref 0–1.5)
BILIRUB SERPL-MCNC: 0.4 MG/DL (ref 0.2–1.2)
BUN SERPL-MCNC: 32 MG/DL (ref 6–20)
BUN/CREAT SERPL: 31.7 (ref 7.3–30)
CALCIUM SPEC-SCNC: 9.3 MG/DL (ref 8.5–10.2)
CHLORIDE SERPL-SCNC: 106 MMOL/L (ref 98–107)
CHOLEST SERPL-MCNC: 172 MG/DL (ref 0–200)
CO2 SERPL-SCNC: 22.8 MMOL/L (ref 22–29)
CREAT SERPL-MCNC: 1.01 MG/DL (ref 0.6–1.1)
DEPRECATED RDW RBC AUTO: 48.9 FL (ref 37–54)
EOSINOPHIL # BLD AUTO: 0.06 10*3/MM3 (ref 0–0.4)
EOSINOPHIL NFR BLD AUTO: 1.4 % (ref 0.3–6.2)
ERYTHROCYTE [DISTWIDTH] IN BLOOD BY AUTOMATED COUNT: 13.3 % (ref 12.3–15.4)
GFR SERPL CREATININE-BSD FRML MDRD: 55 ML/MIN/1.73
GLOBULIN UR ELPH-MCNC: 2.2 GM/DL (ref 1.8–3.5)
GLUCOSE SERPL-MCNC: 89 MG/DL (ref 74–124)
HCT VFR BLD AUTO: 37.3 % (ref 34–46.6)
HDLC SERPL-MCNC: 61 MG/DL (ref 40–60)
HGB BLD-MCNC: 11.6 G/DL (ref 12–15.9)
IMM GRANULOCYTES # BLD AUTO: 0.01 10*3/MM3 (ref 0–0.05)
IMM GRANULOCYTES NFR BLD AUTO: 0.2 % (ref 0–0.5)
LDLC SERPL CALC-MCNC: 95 MG/DL (ref 0–100)
LDLC/HDLC SERPL: 1.54 {RATIO}
LYMPHOCYTES # BLD AUTO: 0.93 10*3/MM3 (ref 0.7–3.1)
LYMPHOCYTES NFR BLD AUTO: 21.6 % (ref 19.6–45.3)
MAGNESIUM SERPL-MCNC: 1.8 MG/DL (ref 1.8–2.5)
MCH RBC QN AUTO: 31.1 PG (ref 26.6–33)
MCHC RBC AUTO-ENTMCNC: 31.1 G/DL (ref 31.5–35.7)
MCV RBC AUTO: 100 FL (ref 79–97)
MONOCYTES # BLD AUTO: 0.19 10*3/MM3 (ref 0.1–0.9)
MONOCYTES NFR BLD AUTO: 4.4 % (ref 5–12)
NEUTROPHILS NFR BLD AUTO: 3.08 10*3/MM3 (ref 1.7–7)
NEUTROPHILS NFR BLD AUTO: 71.7 % (ref 42.7–76)
NRBC BLD AUTO-RTO: 0 /100 WBC (ref 0–0.2)
PHOSPHATE SERPL-MCNC: 3.9 MG/DL (ref 2.5–4.5)
PLATELET # BLD AUTO: 180 10*3/MM3 (ref 140–450)
PMV BLD AUTO: 8.4 FL (ref 6–12)
POTASSIUM SERPL-SCNC: 4.3 MMOL/L (ref 3.5–4.7)
PROT SERPL-MCNC: 6.7 G/DL (ref 6.3–8)
RBC # BLD AUTO: 3.73 10*6/MM3 (ref 3.77–5.28)
SODIUM SERPL-SCNC: 139 MMOL/L (ref 134–145)
TRIGL SERPL-MCNC: 84 MG/DL (ref 0–150)
VLDLC SERPL-MCNC: 16 MG/DL (ref 5–40)
WBC # BLD AUTO: 4.3 10*3/MM3 (ref 3.4–10.8)

## 2021-06-14 PROCEDURE — 80053 COMPREHEN METABOLIC PANEL: CPT

## 2021-06-14 PROCEDURE — 84100 ASSAY OF PHOSPHORUS: CPT

## 2021-06-14 PROCEDURE — 83735 ASSAY OF MAGNESIUM: CPT

## 2021-06-14 PROCEDURE — 77290 THER RAD SIMULAJ FIELD CPLX: CPT | Performed by: RADIOLOGY

## 2021-06-14 PROCEDURE — 85025 COMPLETE CBC W/AUTO DIFF WBC: CPT

## 2021-06-14 PROCEDURE — 80061 LIPID PANEL: CPT | Performed by: NURSE PRACTITIONER

## 2021-06-14 PROCEDURE — 77334 RADIATION TREATMENT AID(S): CPT | Performed by: RADIOLOGY

## 2021-06-14 PROCEDURE — 36415 COLL VENOUS BLD VENIPUNCTURE: CPT

## 2021-06-14 PROCEDURE — 99214 OFFICE O/P EST MOD 30 MIN: CPT | Performed by: NURSE PRACTITIONER

## 2021-06-15 ENCOUNTER — MEDICATION THERAPY MANAGEMENT (OUTPATIENT)
Dept: PHARMACY | Facility: HOSPITAL | Age: 61
End: 2021-06-15

## 2021-06-15 PROCEDURE — 77470 SPECIAL RADIATION TREATMENT: CPT | Performed by: RADIOLOGY

## 2021-06-15 NOTE — PROGRESS NOTES
MTM Encounter via phone: adherence/tolerance - Kisqali    No answer today. LM direct line [phone]    Geovanna Masterson, Pharmacy Intern

## 2021-06-16 PROCEDURE — 77301 RADIOTHERAPY DOSE PLAN IMRT: CPT | Performed by: RADIOLOGY

## 2021-06-16 PROCEDURE — 77338 DESIGN MLC DEVICE FOR IMRT: CPT | Performed by: RADIOLOGY

## 2021-06-16 NOTE — TELEPHONE ENCOUNTER
Dr Jose R SUN: Pt did have labs with [facility].  Results are in Epic.      LMM with pt to verify how her BP was doing.

## 2021-06-16 NOTE — TELEPHONE ENCOUNTER
Looks like patient's oncology and no mention of hip surgery.  Please check with the patient and see if she still plans on doing this before following chemotherapy

## 2021-06-17 DIAGNOSIS — Z17.0 MALIGNANT NEOPLASM OF OVERLAPPING SITES OF LEFT BREAST IN FEMALE, ESTROGEN RECEPTOR POSITIVE (HCC): ICD-10-CM

## 2021-06-17 DIAGNOSIS — C50.812 MALIGNANT NEOPLASM OF OVERLAPPING SITES OF LEFT BREAST IN FEMALE, ESTROGEN RECEPTOR POSITIVE (HCC): ICD-10-CM

## 2021-06-17 PROCEDURE — 77300 RADIATION THERAPY DOSE PLAN: CPT | Performed by: RADIOLOGY

## 2021-06-17 NOTE — TELEPHONE ENCOUNTER
Spoke with pt.  Verbalized understanding.  BP is staying at 110/60s.  She did have lipids with CBC.  She was fasting but she did drink a cup of black coffee that AM.

## 2021-06-17 NOTE — TELEPHONE ENCOUNTER
Spoke with pt.  She said that Dr Constantino and Jaleel do know that she needs a hip and ok with that.  She will have surgery about 2 weeks following the last treatment.

## 2021-06-17 NOTE — TELEPHONE ENCOUNTER
Please let patient know lipid panel good HDL and triglycerides are fine, LDL is borderline elevated.  After she recovers from chemo will slowly increase exercise.

## 2021-06-21 RX ORDER — DICLOFENAC SODIUM 75 MG/1
TABLET, DELAYED RELEASE ORAL
Qty: 60 TABLET | Refills: 2 | Status: SHIPPED | OUTPATIENT
Start: 2021-06-21 | End: 2021-07-21 | Stop reason: HOSPADM

## 2021-06-22 ENCOUNTER — LAB (OUTPATIENT)
Dept: LAB | Facility: HOSPITAL | Age: 61
End: 2021-06-22

## 2021-06-22 ENCOUNTER — CLINICAL SUPPORT (OUTPATIENT)
Dept: ONCOLOGY | Facility: HOSPITAL | Age: 61
End: 2021-06-22

## 2021-06-22 ENCOUNTER — OFFICE VISIT (OUTPATIENT)
Dept: ONCOLOGY | Facility: CLINIC | Age: 61
End: 2021-06-22

## 2021-06-22 ENCOUNTER — TELEPHONE (OUTPATIENT)
Dept: ONCOLOGY | Facility: CLINIC | Age: 61
End: 2021-06-22

## 2021-06-22 VITALS
HEART RATE: 51 BPM | SYSTOLIC BLOOD PRESSURE: 146 MMHG | RESPIRATION RATE: 16 BRPM | HEIGHT: 65 IN | DIASTOLIC BLOOD PRESSURE: 90 MMHG | WEIGHT: 162.1 LBS | OXYGEN SATURATION: 100 % | TEMPERATURE: 97.8 F | BODY MASS INDEX: 27.01 KG/M2

## 2021-06-22 VITALS — HEIGHT: 65 IN | BODY MASS INDEX: 26.97 KG/M2

## 2021-06-22 DIAGNOSIS — C50.812 MALIGNANT NEOPLASM OF OVERLAPPING SITES OF LEFT BREAST IN FEMALE, ESTROGEN RECEPTOR POSITIVE (HCC): ICD-10-CM

## 2021-06-22 DIAGNOSIS — C50.811 MALIGNANT NEOPLASM OF OVERLAPPING SITES OF BOTH BREASTS IN FEMALE, ESTROGEN RECEPTOR POSITIVE (HCC): ICD-10-CM

## 2021-06-22 DIAGNOSIS — Z17.0 MALIGNANT NEOPLASM OF OVERLAPPING SITES OF BOTH BREASTS IN FEMALE, ESTROGEN RECEPTOR POSITIVE (HCC): ICD-10-CM

## 2021-06-22 DIAGNOSIS — C50.812 MALIGNANT NEOPLASM OF OVERLAPPING SITES OF BOTH BREASTS IN FEMALE, ESTROGEN RECEPTOR POSITIVE (HCC): ICD-10-CM

## 2021-06-22 DIAGNOSIS — Z79.899 ENCOUNTER FOR LONG-TERM (CURRENT) USE OF OTHER MEDICATIONS: ICD-10-CM

## 2021-06-22 DIAGNOSIS — C50.812 MALIGNANT NEOPLASM OF OVERLAPPING SITES OF LEFT BREAST IN FEMALE, ESTROGEN RECEPTOR POSITIVE (HCC): Primary | ICD-10-CM

## 2021-06-22 DIAGNOSIS — Z80.3 FAMILY HISTORY OF BREAST CANCER IN FEMALE: ICD-10-CM

## 2021-06-22 DIAGNOSIS — Z79.899 ENCOUNTER FOR LONG-TERM (CURRENT) USE OF MEDICATIONS: ICD-10-CM

## 2021-06-22 DIAGNOSIS — I10 ESSENTIAL HYPERTENSION: ICD-10-CM

## 2021-06-22 DIAGNOSIS — Z17.0 MALIGNANT NEOPLASM OF OVERLAPPING SITES OF LEFT BREAST IN FEMALE, ESTROGEN RECEPTOR POSITIVE (HCC): ICD-10-CM

## 2021-06-22 DIAGNOSIS — Z17.0 MALIGNANT NEOPLASM OF OVERLAPPING SITES OF LEFT BREAST IN FEMALE, ESTROGEN RECEPTOR POSITIVE (HCC): Primary | ICD-10-CM

## 2021-06-22 LAB
ALBUMIN SERPL-MCNC: 4.6 G/DL (ref 3.5–5.2)
ALBUMIN/GLOB SERPL: 2.3 G/DL (ref 1.1–2.4)
ALP SERPL-CCNC: 90 U/L (ref 38–116)
ALT SERPL W P-5'-P-CCNC: 19 U/L (ref 0–33)
ANION GAP SERPL CALCULATED.3IONS-SCNC: 10.2 MMOL/L (ref 5–15)
AST SERPL-CCNC: 21 U/L (ref 0–32)
BASOPHILS # BLD AUTO: 0.03 10*3/MM3 (ref 0–0.2)
BASOPHILS NFR BLD AUTO: 1.1 % (ref 0–1.5)
BILIRUB SERPL-MCNC: 0.4 MG/DL (ref 0.2–1.2)
BUN SERPL-MCNC: 27 MG/DL (ref 6–20)
BUN/CREAT SERPL: 26.7 (ref 7.3–30)
CALCIUM SPEC-SCNC: 9.5 MG/DL (ref 8.5–10.2)
CANCER AG15-3 SERPL-ACNC: 14.5 U/ML
CHLORIDE SERPL-SCNC: 105 MMOL/L (ref 98–107)
CO2 SERPL-SCNC: 23.8 MMOL/L (ref 22–29)
CREAT SERPL-MCNC: 1.01 MG/DL (ref 0.6–1.1)
DEPRECATED RDW RBC AUTO: 48.1 FL (ref 37–54)
EOSINOPHIL # BLD AUTO: 0.05 10*3/MM3 (ref 0–0.4)
EOSINOPHIL NFR BLD AUTO: 1.9 % (ref 0.3–6.2)
ERYTHROCYTE [DISTWIDTH] IN BLOOD BY AUTOMATED COUNT: 13.4 % (ref 12.3–15.4)
GFR SERPL CREATININE-BSD FRML MDRD: 55 ML/MIN/1.73
GLOBULIN UR ELPH-MCNC: 2 GM/DL (ref 1.8–3.5)
GLUCOSE SERPL-MCNC: 95 MG/DL (ref 74–124)
HCT VFR BLD AUTO: 37.4 % (ref 34–46.6)
HGB BLD-MCNC: 11.9 G/DL (ref 12–15.9)
IMM GRANULOCYTES # BLD AUTO: 0 10*3/MM3 (ref 0–0.05)
IMM GRANULOCYTES NFR BLD AUTO: 0 % (ref 0–0.5)
LYMPHOCYTES # BLD AUTO: 0.74 10*3/MM3 (ref 0.7–3.1)
LYMPHOCYTES NFR BLD AUTO: 27.8 % (ref 19.6–45.3)
MCH RBC QN AUTO: 31.6 PG (ref 26.6–33)
MCHC RBC AUTO-ENTMCNC: 31.8 G/DL (ref 31.5–35.7)
MCV RBC AUTO: 99.5 FL (ref 79–97)
MONOCYTES # BLD AUTO: 0.18 10*3/MM3 (ref 0.1–0.9)
MONOCYTES NFR BLD AUTO: 6.8 % (ref 5–12)
NEUTROPHILS NFR BLD AUTO: 1.66 10*3/MM3 (ref 1.7–7)
NEUTROPHILS NFR BLD AUTO: 62.4 % (ref 42.7–76)
NRBC BLD AUTO-RTO: 0 /100 WBC (ref 0–0.2)
PLATELET # BLD AUTO: 230 10*3/MM3 (ref 140–450)
PMV BLD AUTO: 8.5 FL (ref 6–12)
POTASSIUM SERPL-SCNC: 4.9 MMOL/L (ref 3.5–4.7)
PROT SERPL-MCNC: 6.6 G/DL (ref 6.3–8)
RBC # BLD AUTO: 3.76 10*6/MM3 (ref 3.77–5.28)
SODIUM SERPL-SCNC: 139 MMOL/L (ref 134–145)
WBC # BLD AUTO: 2.66 10*3/MM3 (ref 3.4–10.8)

## 2021-06-22 PROCEDURE — 36415 COLL VENOUS BLD VENIPUNCTURE: CPT | Performed by: INTERNAL MEDICINE

## 2021-06-22 PROCEDURE — 99214 OFFICE O/P EST MOD 30 MIN: CPT | Performed by: NURSE PRACTITIONER

## 2021-06-22 PROCEDURE — 80053 COMPREHEN METABOLIC PANEL: CPT

## 2021-06-22 PROCEDURE — 77427 RADIATION TX MANAGEMENT X5: CPT | Performed by: RADIOLOGY

## 2021-06-22 PROCEDURE — 85025 COMPLETE CBC W/AUTO DIFF WBC: CPT

## 2021-06-22 PROCEDURE — 93000 ELECTROCARDIOGRAM COMPLETE: CPT | Performed by: NURSE PRACTITIONER

## 2021-06-22 PROCEDURE — 86300 IMMUNOASSAY TUMOR CA 15-3: CPT | Performed by: INTERNAL MEDICINE

## 2021-06-22 PROCEDURE — 77386: CPT | Performed by: RADIOLOGY

## 2021-06-22 PROCEDURE — 77386 CHG INTENSITY MODULATED RADIATION TX DLVR COMPLEX: CPT | Performed by: RADIOLOGY

## 2021-06-22 PROCEDURE — 93010 ELECTROCARDIOGRAM REPORT: CPT | Performed by: INTERNAL MEDICINE

## 2021-06-22 PROCEDURE — 93005 ELECTROCARDIOGRAM TRACING: CPT

## 2021-06-22 PROCEDURE — 93005 ELECTROCARDIOGRAM TRACING: CPT | Performed by: NURSE PRACTITIONER

## 2021-06-22 NOTE — PROGRESS NOTES
Subjective     REASON FOR FOLLOW UP:    1. 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 R BREAST MASS WITH GIANT R AXILLARY ADENOPATHY.  3. FAMILY HISTORY OF BREAST CANCER IN MOTHER AND SISTER, SISTER WAS TESTED FOR BRCA AND WAS NEGATIVE.  4. LEFT OVARIAN CYST , IT HAS BEEN PRESENT FOR LONG TIME BUT IS GETTING LARGER, ASYMPTOMATIC, NEED FOR , PELVIC US AND GYN ONC EVEAL                 5. LEFT HIP PAIN SEVERE ARTHRITIS, REAL NEED FOR LEFT HIP REPLACEMENT.    History of Present Illness    The patient is a 61 y.o. female with the above-mentioned history who is here today for toxicity check continuing on Kisquali.  She reports intermittent issues with diarrhea and constipation which she feels some is related to diet.  She otherwise has been feeling okay.  She continues to work although is hoping to retire soon.    Patient will be starting radiation therapy today to the mass in the right cardiophrenic fat pad.      Past Medical History:   Diagnosis Date   • Anemia in neoplastic disease    • Arthritis    • Breast cancer (CMS/HCC)     Left   • CVA (cerebral vascular accident) (CMS/HCC)    • Hip pain     RIGHT HIP... CYST   • History of fracture of leg 1987   • History of radiation therapy     LAST TREATMENT     • Hypertension    • Limited joint range of motion (ROM)     RIGHT HIP   • Skin sore     OPEN SORE LEFT BREAST   • Syncope    • Vertigo      Past Surgical History:   Procedure Laterality Date   • AXILLARY LYMPH NODE BIOPSY/EXCISION Right     LYMPH NODE UNDER RIGHT ARM-MALIGNANT (DOUBLE MASTECTOMY)   • BREAST BIOPSY Left     MALIGNANT   • FRACTURE SURGERY  1987    Leg   • HARDWARE REMOVAL     • MASTECTOMY W/ SENTINEL NODE BIOPSY Bilateral 9/16/2019    Procedure: BILATERLA MODIFIED RADICAL MASTECTOMY WITH BILATERAL SENTINEL LYMPH NODE BIOPSY;  Surgeon: Joby Barron Jr., MD;  Location: [facility];  Service: General   • TOTAL HIP ARTHROPLASTY Right 3/2/2020    Procedure:  "RIGHT TOTAL HIP ARTHROPLASTY NATALY NAVIGATION;  Surgeon: Luis M Leonard MD;  Location: [facility] OR;  Service: Orthopedics;  Laterality: Right;   • VENOUS ACCESS DEVICE (PORT) INSERTION Right 2/1/2019    Procedure: INSERTION VENOUS ACCESS DEVICE;  Surgeon: Joby Barron Jr., MD;  Location: [facility] OR OSC;  Service: General   • VENOUS ACCESS DEVICE (PORT) REMOVAL N/A 10/30/2019    Procedure: Mediport Removal;  Surgeon: Joby Barron Jr., MD;  Location: [facility] OR;  Service: General        Current Outpatient Medications on File Prior to Visit   Medication Sig Dispense Refill   • acetaminophen (TYLENOL) 650 MG 8 hr tablet Take 1,950 mg by mouth Daily.     • aspirin 81 MG chewable tablet Chew 81 mg Daily.     • Cholecalciferol 250 MCG (38846 UT) tablet Take 1 tablet by mouth. Patient stated dose as \"1500 mg\"     • Chromium 200 MCG tablet Take 1 tablet by mouth. Patient was unable to state dose     • diclofenac (VOLTAREN) 75 MG EC tablet TAKE ONE TABLET BY MOUTH TWICE A DAY 60 tablet 2   • DM-Phenylephrine-Acetaminophen (UNA-SELTZER PLUS DAY COLD/FLU PO) Take 1 dose by mouth Daily.     • letrozole (FEMARA) 2.5 MG tablet Take 1 tablet by mouth Daily. 90 tablet 2   • loperamide (IMODIUM) 2 MG capsule Take 2 mg by mouth 4 (Four) Times a Day As Needed for Diarrhea.     • magnesium oxide (MAG-OX) 400 MG tablet Take 400 mg by mouth 2 (two) times a day. Patient stated dose as \"1500mg\"     • meclizine (ANTIVERT) 25 MG tablet Take 1 tablet by mouth 3 (Three) Times a Day As Needed for Dizziness. 90 tablet 0   • metoprolol succinate XL (TOPROL-XL) 25 MG 24 hr tablet TAKE 1 TABLET BY MOUTH EVERY DAY 30 tablet 6   • ondansetron (ZOFRAN) 4 MG tablet Take 1 tablet by mouth Every 8 (Eight) Hours As Needed for Nausea or Vomiting. 90 tablet 0   • ribociclib succinate 200 MG tablet therapy pack tablet Take 3 tablets by mouth Daily. For 21 days and then 7 days off 63 tablet 0     No current facility-administered medications on " "file prior to visit.        ALLERGIES:    Allergies   Allergen Reactions   • Benadryl [Diphenhydramine] Itching     INCREASES BLOOD PRESSURE   • Erythromycin GI Intolerance   • Levaquin [Levofloxacin] GI Intolerance   • Penicillins GI Intolerance        Social History     Socioeconomic History   • Marital status:      Spouse name: Cruz   • Number of children: 0   • Years of education: Not on file   • Highest education level: Not on file   Tobacco Use   • Smoking status: Never Smoker   • Smokeless tobacco: Never Used   Substance and Sexual Activity   • Alcohol use: Not Currently     Alcohol/week: 7.0 standard drinks     Types: 4 Glasses of wine, 3 Cans of beer per week     Comment: 2 CUPS DAILY   • Drug use: No   • Sexual activity: Not Currently     Partners: Male     Birth control/protection: Post-menopausal        Family History   Problem Relation Age of Onset   • Lung cancer Father    • Cancer Mother    • Breast cancer Mother    • Liver disease Mother    • Breast cancer Sister 48   • Breast cancer Maternal Grandmother    • Breast cancer Paternal Grandmother    • Breast cancer Maternal Uncle    • Malig Hyperthermia Neg Hx       Objective     Vitals:    06/22/21 0817   BP: 146/90   Pulse: 51   Resp: 16   Temp: 97.8 °F (36.6 °C)   TempSrc: Skin   SpO2: 100%   Weight: 73.5 kg (162 lb 1.6 oz)   Height: 165.1 cm (65\")   PainSc:   7   PainLoc: Hip  Comment: left     Current Status 6/14/2021   ECOG score 0     Physical Exam  Vitals reviewed.   Constitutional:       General: She is not in acute distress.     Appearance: Normal appearance. She is well-developed.   HENT:      Head: Normocephalic and atraumatic.      Mouth/Throat:      Pharynx: No oropharyngeal exudate.   Eyes:      Pupils: Pupils are equal, round, and reactive to light.   Cardiovascular:      Rate and Rhythm: Normal rate and regular rhythm.      Heart sounds: Normal heart sounds. No murmur heard.     Pulmonary:      Effort: Pulmonary effort is " normal. No respiratory distress.      Breath sounds: Normal breath sounds. No wheezing, rhonchi or rales.   Abdominal:      General: Bowel sounds are normal. There is no distension.      Palpations: Abdomen is soft.   Musculoskeletal:         General: Normal range of motion.      Cervical back: Normal range of motion.   Skin:     General: Skin is warm and dry.      Findings: No rash.   Neurological:      Mental Status: She is alert and oriented to person, place, and time.       EKG sinus rhythm rate of 57, Qt 440/ qtc 428    RECENT LABS:  Results from last 7 days   Lab Units 06/22/21  0817   WBC 10*3/mm3 2.66*   NEUTROS ABS 10*3/mm3 1.66*   HEMOGLOBIN g/dL 11.9*   HEMATOCRIT % 37.4   PLATELETS 10*3/mm3 230     Results from last 7 days   Lab Units 06/22/21  0817   SODIUM mmol/L 139   POTASSIUM mmol/L 4.9*   CHLORIDE mmol/L 105   CO2 mmol/L 23.8   BUN mg/dL 27*   CREATININE mg/dL 1.01   CALCIUM mg/dL 9.5   ALBUMIN g/dL 4.60   BILIRUBIN mg/dL 0.4   ALK PHOS U/L 90   ALT (SGPT) U/L 19   AST (SGOT) U/L 21   GLUCOSE mg/dL 95           Assessment/Plan    1.  History of least for the last 4 years, she has had an in crescendo mass in the left breast that has produced a gigantic tumor that WAS close to 25 cm in size and is replacing most of the anatomy of the left breast. There are areas of ulceration necrosis and bleeding and the mass is fixed to the chest wall. She has abundant amount of collateral circulation in the left anterior chest wall and it caught my attention the lack of any left axillary adenopathy. She has no lymphedema in the left upper extremity. In the right breast while in sitting position, no palpable abnormalities are documented. The right breast skin and nipple are normal. When the patient lies flat, there is a palpable mass at 9 o'clock from the nipple that measures probably 4 cm in size, very indistinct in regard to edges and margins. There was no mobility and no areas of tenderness. She has a giant  adenopathy in the right axilla that measures close to 9 cm in size, uniform, no fluctuation, nontender, completely fixed to the axillary wall. There is no compromise of the skin.     · After completion of 4 cycles of AC patient has had very dramatic improvement in all sites of disease including right axilla and left breast.    · Completed 4 cycles Adriamycin Cytoxan on 4/12/2019.    · Patient started weekly Taxol treatments on 5/3/2019.  · Completed 12 weekly Taxol treatments on 7/19/2019.  · 9/16/2019 bilateral mastectomy by Dr. Joby Barron with axillary lymph node dissection.  No residual malignancy.  · 10/30/2019 patient's Mediport was removed in anticipation of radiation.  · Radiation therapy under the care of Dr. Marmolejo 10/31/2019-1/13/2020 to the right chest wall.  · Started on adjuvant Femara 2.5 mg daily 8/20/2019.  · 9/21/2020 continues on adjuvant Femara, tolerating it quite well.  Some mild hot flashes and mild arthralgias, all which are tolerable.  I advised the patient that at this point her breast cancer remains in remission. She is 100% compliant of her medication letrozole and her clinical examination remains negative normal for breast cancer recurrence.   The patient was further reviewed on 04/16/2021. Her clinical examination is completely negative normal and her questioning is negative in regard to symptoms that would suggest breast cancer recurrence. She is 100% compliant with her Femara. Her white count, hemoglobin and platelets remain normal. Her tumor marker during the previous visit was normal and we have another one pending today CA 15-3.   The patient was further reviewed on 05/17/2021 along with her brother. The clinical examination has not changed. The only new complaint is the progressive amount of discomfort and the inability to function given the pain in the left hip area. Now she is limping. The only time when she is not in discomfort with the left hip is when she is sitting or  lying in bed or riding the horse when gravity takes away the pressure of her left hip area. Radiologically speaking the CT scan of the chest, abdomen and pelvis to my eyes disclosed no abnormalities besides a very simple ovarian cyst that measures close to 7 x 5 cm with no trabeculation. There is no pelvic ascites. There is no pelvic adenopathy. The other abnormality obviously visible is the severe degree of scoliosis of the thoracic, lumbar spines.  subchondral cyst in the left hip and the minimal if any space leftover between the head of the femur and the acetabulum. There is no metastasis in this anatomical site. The radiologist mentioned cardiophrenic angle lymphadenopathy. Pulmonary anatomy is normal. Hilar adenopathy is normal. No pericardial effusion. No pleural effusion. Minimal infiltrate in the lungs related to radiation changes. Liver anatomy, pancreas and kidneys were unremarkable. The scoliosis is very obvious in the view of the abdomen.   The patient returned to the office on 05/24/2021. Since the previous visit the patient proceeded with a PET scan and I have reviewed this with her in the PAC system showing chest wall on the left side area of enlightening SUV activity that is almost 3 cm long x 7 mm wide. It is behind the bone. It is very close to the lower aspect of her heart. The reset of the PET scan discloses no activity. This is also specific in regard to the ovaries and actually an ultrasound of her vagina looking into the ovaries documented an unilocular cyst on the left side with typical features of benignity and a  was completely normal 5.5. 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 The rest of the PET scan is very much negative normal. I would not be surprised if this is an area of the internal mammary  node chain.   Obviously the ovarian cyst is benign only locular with a normal  and I find no reason to refer her to GYN oncology anymore.   started Kisquali on 6/8/2021 under the direction of Dr. Dixon. She has had some nausea that is controlled with ondansetron.  She denies bowel changes.  She is had no new pain.  She was seen by Dr. Odell in preparation for radiation oncology evaluation which she is already had as well.  Anticipate initiating radiation in the next week or 2.  6/22/2021 patient continues on Kisquali overall tolerating well.  She notes alternating diarrhea and constipation but feels that it is tolerable.  EKG was reviewed reviewed today.  Patient will be starting radiation therapy today as well.    PLAN:    1. Continue Kisquali.  2. Continue letrozole 2.5 mg daily.  3. Follow-up with Dr. Dixon as scheduled on 7/7/2021 with repeat labs and EKG.  4. Continue radiation therapy under the care of Dr. Marmolejo.   5. The patient will require to have a repeat PET scan at least 6-8 weeks after completion of her radiation therapy to the chest wall.     Patient on high risk medication requiring close monitoring for toxicity.      Caitlyn Narvaez, APRN  06/22/2021          Alternating diarrhea and constipation  ekg SR- 57

## 2021-06-22 NOTE — TELEPHONE ENCOUNTER
----- Message from SIAIAS Andino sent at 6/22/2021  3:40 PM EDT -----  Please add patient on for EKG as well when she is seen by Dr. Dixon on 7/7/2021.  I will get the order in.    Thanks,    Caitlyn

## 2021-06-23 PROCEDURE — 77386 CHG INTENSITY MODULATED RADIATION TX DLVR COMPLEX: CPT | Performed by: RADIOLOGY

## 2021-06-23 PROCEDURE — 77386: CPT | Performed by: RADIOLOGY

## 2021-06-24 PROCEDURE — 77386 CHG INTENSITY MODULATED RADIATION TX DLVR COMPLEX: CPT | Performed by: RADIOLOGY

## 2021-06-24 PROCEDURE — 77386: CPT | Performed by: RADIOLOGY

## 2021-06-25 ENCOUNTER — TRANSCRIBE ORDERS (OUTPATIENT)
Dept: PREADMISSION TESTING | Facility: HOSPITAL | Age: 61
End: 2021-06-25

## 2021-06-25 DIAGNOSIS — Z01.818 OTHER SPECIFIED PRE-OPERATIVE EXAMINATION: Primary | ICD-10-CM

## 2021-06-25 PROCEDURE — 77386: CPT | Performed by: RADIOLOGY

## 2021-06-25 PROCEDURE — 77386 CHG INTENSITY MODULATED RADIATION TX DLVR COMPLEX: CPT | Performed by: RADIOLOGY

## 2021-06-28 ENCOUNTER — RADIATION ONCOLOGY WEEKLY ASSESSMENT (OUTPATIENT)
Dept: RADIATION ONCOLOGY | Facility: HOSPITAL | Age: 61
End: 2021-06-28

## 2021-06-28 DIAGNOSIS — C50.812 MALIGNANT NEOPLASM OF OVERLAPPING SITES OF BOTH BREASTS IN FEMALE, ESTROGEN RECEPTOR POSITIVE (HCC): Primary | ICD-10-CM

## 2021-06-28 DIAGNOSIS — C50.811 MALIGNANT NEOPLASM OF OVERLAPPING SITES OF BOTH BREASTS IN FEMALE, ESTROGEN RECEPTOR POSITIVE (HCC): Primary | ICD-10-CM

## 2021-06-28 DIAGNOSIS — Z17.0 MALIGNANT NEOPLASM OF OVERLAPPING SITES OF BOTH BREASTS IN FEMALE, ESTROGEN RECEPTOR POSITIVE (HCC): Primary | ICD-10-CM

## 2021-06-28 PROCEDURE — 77386: CPT | Performed by: RADIOLOGY

## 2021-06-28 PROCEDURE — 77386 CHG INTENSITY MODULATED RADIATION TX DLVR COMPLEX: CPT | Performed by: RADIOLOGY

## 2021-06-28 NOTE — PROGRESS NOTES
Physician Weekly Management Note    Diagnosis:     Diagnosis Plan   1. Malignant neoplasm of overlapping sites of both breasts in female, estrogen receptor positive (CMS/HCC)         RT Details:  Treatment #5/9     Cardiophrenic fat pad nodule    Notes on Treatment course, Films, Medical progress:  Doing well, energy level is intact, denies pain, no skin issues.  Continue as planned.      [facility] Onc ECOG Status: (0) Fully active, able to carry on all predisease performance without restriction      Weekly Management:  Medication reviewed?   Yes  New medications given?   No  Problemlist reviewed?   Yes  Problem added?   No      Technical aspects reviewed:  Weekly OBI approved?   Yes  Weekly port films approved?   Yes  Change requests noted on port film?   No  Patient setup and plan reviewed?   Yes    Chart Reviewed:  Continue current treatment plan?   Yes  Treatment plan change requested?   No  CBC reviewed?   No  Concurrent Chemo?   No    Objective     Toxicities:   As above     Review of Systems   As above    There were no vitals filed for this visit.    Current Status 6/14/2021   ECOG score 0       Physical Exam  As above      Problem Summary List    Diagnosis:     Diagnosis Plan   1. Malignant neoplasm of overlapping sites of both breasts in female, estrogen receptor positive (CMS/HCC)       Pathology:       Past Medical History:   Diagnosis Date   • Anemia in neoplastic disease    • Arthritis    • Breast cancer (CMS/HCC)     Left   • CVA (cerebral vascular accident) (CMS/HCC)    • Hip pain     RIGHT HIP... CYST   • History of fracture of leg 1987   • History of radiation therapy     LAST TREATMENT     • Hypertension    • Limited joint range of motion (ROM)     RIGHT HIP   • Skin sore     OPEN SORE LEFT BREAST   • Syncope    • Vertigo          Past Surgical History:   Procedure Laterality Date   • AXILLARY LYMPH NODE BIOPSY/EXCISION Right     LYMPH NODE UNDER RIGHT ARM-MALIGNANT (DOUBLE MASTECTOMY)  "  • BREAST BIOPSY Left     MALIGNANT   • FRACTURE SURGERY  1987    Leg   • HARDWARE REMOVAL     • MASTECTOMY W/ SENTINEL NODE BIOPSY Bilateral 9/16/2019    Procedure: BILATERLA MODIFIED RADICAL MASTECTOMY WITH BILATERAL SENTINEL LYMPH NODE BIOPSY;  Surgeon: Joby Barron Jr., MD;  Location: [facility] OR;  Service: General   • TOTAL HIP ARTHROPLASTY Right 3/2/2020    Procedure: RIGHT TOTAL HIP ARTHROPLASTY NATALY NAVIGATION;  Surgeon: Luis M Leonard MD;  Location: [facility] OR;  Service: Orthopedics;  Laterality: Right;   • VENOUS ACCESS DEVICE (PORT) INSERTION Right 2/1/2019    Procedure: INSERTION VENOUS ACCESS DEVICE;  Surgeon: Joby Barron Jr., MD;  Location: [facility];  Service: General   • VENOUS ACCESS DEVICE (PORT) REMOVAL N/A 10/30/2019    Procedure: Mediport Removal;  Surgeon: Joby Barron Jr., MD;  Location: [facility] OR;  Service: General         Current Outpatient Medications on File Prior to Visit   Medication Sig Dispense Refill   • acetaminophen (TYLENOL) 650 MG 8 hr tablet Take 1,950 mg by mouth Daily.     • aspirin 81 MG chewable tablet Chew 81 mg Daily.     • Cholecalciferol 250 MCG (64282 UT) tablet Take 1 tablet by mouth. Patient stated dose as \"1500 mg\"     • Chromium 200 MCG tablet Take 1 tablet by mouth. Patient was unable to state dose     • diclofenac (VOLTAREN) 75 MG EC tablet TAKE ONE TABLET BY MOUTH TWICE A DAY 60 tablet 2   • DM-Phenylephrine-Acetaminophen (UNA-SELTZER PLUS DAY COLD/FLU PO) Take 1 dose by mouth Daily.     • letrozole (FEMARA) 2.5 MG tablet Take 1 tablet by mouth Daily. 90 tablet 2   • loperamide (IMODIUM) 2 MG capsule Take 2 mg by mouth 4 (Four) Times a Day As Needed for Diarrhea.     • magnesium oxide (MAG-OX) 400 MG tablet Take 400 mg by mouth 2 (two) times a day. Patient stated dose as \"1500mg\"     • meclizine (ANTIVERT) 25 MG tablet Take 1 tablet by mouth 3 (Three) Times a Day As Needed for Dizziness. 90 tablet 0   • metoprolol succinate XL " (TOPROL-XL) 25 MG 24 hr tablet TAKE 1 TABLET BY MOUTH EVERY DAY 30 tablet 6   • ondansetron (ZOFRAN) 4 MG tablet Take 1 tablet by mouth Every 8 (Eight) Hours As Needed for Nausea or Vomiting. 90 tablet 0   • ribociclib succinate 200 MG tablet therapy pack tablet Take 3 tablets by mouth Daily. For 21 days and then 7 days off 63 tablet 0     No current facility-administered medications on file prior to visit.       Allergies   Allergen Reactions   • Benadryl [Diphenhydramine] Itching     INCREASES BLOOD PRESSURE   • Erythromycin GI Intolerance   • Levaquin [Levofloxacin] GI Intolerance   • Penicillins GI Intolerance         Primary care MD:    Provider, No Known    Oncologist:  Patient Care Team:  Joby Barron Jr., MD as Referring Physician (General Surgery)  Elie Dixon MD as Consulting Physician (Hematology and Oncology)  Pedro Marmolejo MD as Consulting Physician (Radiation Oncology)       Seen and approved by:  Pedro Marmolejo MD  06/28/2021

## 2021-06-29 ENCOUNTER — MEDICATION THERAPY MANAGEMENT (OUTPATIENT)
Dept: PHARMACY | Facility: HOSPITAL | Age: 61
End: 2021-06-29

## 2021-06-29 ENCOUNTER — TELEPHONE (OUTPATIENT)
Dept: ONCOLOGY | Facility: CLINIC | Age: 61
End: 2021-06-29

## 2021-06-29 PROCEDURE — 77386: CPT | Performed by: RADIOLOGY

## 2021-06-29 PROCEDURE — 77427 RADIATION TX MANAGEMENT X5: CPT | Performed by: RADIOLOGY

## 2021-06-29 PROCEDURE — 77386 CHG INTENSITY MODULATED RADIATION TX DLVR COMPLEX: CPT | Performed by: RADIOLOGY

## 2021-06-29 PROCEDURE — 77336 RADIATION PHYSICS CONSULT: CPT | Performed by: RADIOLOGY

## 2021-06-29 NOTE — TELEPHONE ENCOUNTER
Caller: RHONDA    Relationship: PATIENT    Best call back number: [phone]     What is the best time to reach you: ANYTIME    What was the call regarding: SHE'S CALLING FOR AN UPDATE ON THE REFILL FOR HER MEDICATION KISQALI. SHE IS COMPLETELY OUT.    Do you require a callback: IF NECESSARY

## 2021-06-29 NOTE — TELEPHONE ENCOUNTER
HUB message from pt forwarded to me by Sara RN in Triage. Pt was asking for an update on her Kisqali refill.    This was escribed to [facility] this morning by Kori River, [facility] Pharmacist.     I have left pt a message to let her know it was refilled this morning.

## 2021-06-29 NOTE — PROGRESS NOTES
MTM telephone encounter re Conradosqali delivery    New rx has been escribed to her pharmacy.  We also have rx here at [facility] from [facility] which is ready for her to .  VM left with this information.

## 2021-06-30 PROCEDURE — 77386: CPT | Performed by: RADIOLOGY

## 2021-06-30 PROCEDURE — 77014 CHG CT GUIDANCE RADIATION THERAPY FLDS PLACEMENT: CPT | Performed by: RADIOLOGY

## 2021-06-30 PROCEDURE — 77386 CHG INTENSITY MODULATED RADIATION TX DLVR COMPLEX: CPT | Performed by: RADIOLOGY

## 2021-07-01 ENCOUNTER — APPOINTMENT (OUTPATIENT)
Dept: RADIATION ONCOLOGY | Facility: HOSPITAL | Age: 61
End: 2021-07-01

## 2021-07-01 ENCOUNTER — RADIATION ONCOLOGY WEEKLY ASSESSMENT (OUTPATIENT)
Dept: RADIATION ONCOLOGY | Facility: HOSPITAL | Age: 61
End: 2021-07-01

## 2021-07-01 ENCOUNTER — APPOINTMENT (OUTPATIENT)
Dept: PREADMISSION TESTING | Facility: HOSPITAL | Age: 61
End: 2021-07-01

## 2021-07-01 DIAGNOSIS — C50.811 MALIGNANT NEOPLASM OF OVERLAPPING SITES OF BOTH BREASTS IN FEMALE, ESTROGEN RECEPTOR POSITIVE (HCC): Primary | ICD-10-CM

## 2021-07-01 DIAGNOSIS — Z17.0 MALIGNANT NEOPLASM OF OVERLAPPING SITES OF BOTH BREASTS IN FEMALE, ESTROGEN RECEPTOR POSITIVE (HCC): Primary | ICD-10-CM

## 2021-07-01 DIAGNOSIS — C50.812 MALIGNANT NEOPLASM OF OVERLAPPING SITES OF BOTH BREASTS IN FEMALE, ESTROGEN RECEPTOR POSITIVE (HCC): Primary | ICD-10-CM

## 2021-07-01 PROCEDURE — 77386 CHG INTENSITY MODULATED RADIATION TX DLVR COMPLEX: CPT | Performed by: RADIOLOGY

## 2021-07-01 PROCEDURE — 77386: CPT | Performed by: RADIOLOGY

## 2021-07-01 PROCEDURE — 77014 CHG CT GUIDANCE RADIATION THERAPY FLDS PLACEMENT: CPT | Performed by: RADIOLOGY

## 2021-07-01 NOTE — PROGRESS NOTES
Physician Weekly Management Note    Diagnosis:     Diagnosis Plan   1. Malignant neoplasm of overlapping sites of both breasts in female, estrogen receptor positive (CMS/HCC)         RT Details:    Treatment # 8/9       mass, cardiophrenic fat pad    Notes on Treatment course, Films, Medical progress:  Demonstrating excellent tolerance.  No treatment related issues.  As she is plugged in with the [facility] group will not schedule her for follow-up here.      [facility] ECOG Status: (0) Fully active, able to carry on all predisease performance without restriction      Weekly Management:  Medication reviewed?   Yes  New medications given?   No  Problemlist reviewed?   Yes  Problem added?   No      Technical aspects reviewed:  Weekly OBI approved?   Yes  Weekly port films approved?   Yes  Change requests noted on port film?   No  Patient setup and plan reviewed?   Yes    Chart Reviewed:  Continue current treatment plan?   Yes  Treatment plan change requested?   No  CBC reviewed?   No  Concurrent Chemo?   No    Objective     Toxicities:   As above     Review of Systems   As above    There were no vitals filed for this visit.    Current Status 6/14/2021   ECOG score 0       Physical Exam  As above      Problem Summary List    Diagnosis:     Diagnosis Plan   1. Malignant neoplasm of overlapping sites of both breasts in female, estrogen receptor positive (CMS/HCC)       Pathology:       Past Medical History:   Diagnosis Date   • Anemia in neoplastic disease    • Arthritis    • Breast cancer (CMS/HCC)     Left   • CVA (cerebral vascular accident) (CMS/HCC)    • Hip pain     RIGHT HIP... CYST   • History of fracture of leg 1987   • History of radiation therapy     LAST TREATMENT     • Hypertension    • Limited joint range of motion (ROM)     RIGHT HIP   • Skin sore     OPEN SORE LEFT BREAST   • Syncope    • Vertigo          Past Surgical History:   Procedure Laterality Date   • AXILLARY LYMPH NODE BIOPSY/EXCISION  "Right     LYMPH NODE UNDER RIGHT ARM-MALIGNANT (DOUBLE MASTECTOMY)   • BREAST BIOPSY Left     MALIGNANT   • FRACTURE SURGERY  1987    Leg   • HARDWARE REMOVAL     • MASTECTOMY W/ SENTINEL NODE BIOPSY Bilateral 9/16/2019    Procedure: BILATERLA MODIFIED RADICAL MASTECTOMY WITH BILATERAL SENTINEL LYMPH NODE BIOPSY;  Surgeon: Joby Barron Jr., MD;  Location: [facility] OR;  Service: General   • TOTAL HIP ARTHROPLASTY Right 3/2/2020    Procedure: RIGHT TOTAL HIP ARTHROPLASTY NATALY NAVIGATION;  Surgeon: Luis M Leonard MD;  Location: [facility] OR;  Service: Orthopedics;  Laterality: Right;   • VENOUS ACCESS DEVICE (PORT) INSERTION Right 2/1/2019    Procedure: INSERTION VENOUS ACCESS DEVICE;  Surgeon: Joby Barron Jr., MD;  Location: [facility];  Service: General   • VENOUS ACCESS DEVICE (PORT) REMOVAL N/A 10/30/2019    Procedure: Mediport Removal;  Surgeon: Joby Barron Jr., MD;  Location: [facility] OR;  Service: General         Current Outpatient Medications on File Prior to Visit   Medication Sig Dispense Refill   • acetaminophen (TYLENOL) 650 MG 8 hr tablet Take 1,950 mg by mouth Daily.     • aspirin 81 MG chewable tablet Chew 81 mg Daily.     • Cholecalciferol 250 MCG (54623 UT) tablet Take 1 tablet by mouth. Patient stated dose as \"1500 mg\"     • Chromium 200 MCG tablet Take 1 tablet by mouth. Patient was unable to state dose     • diclofenac (VOLTAREN) 75 MG EC tablet TAKE ONE TABLET BY MOUTH TWICE A DAY 60 tablet 2   • DM-Phenylephrine-Acetaminophen (UNA-SELTZER PLUS DAY COLD/FLU PO) Take 1 dose by mouth Daily.     • letrozole (FEMARA) 2.5 MG tablet Take 1 tablet by mouth Daily. 90 tablet 2   • loperamide (IMODIUM) 2 MG capsule Take 2 mg by mouth 4 (Four) Times a Day As Needed for Diarrhea.     • magnesium oxide (MAG-OX) 400 MG tablet Take 400 mg by mouth 2 (two) times a day. Patient stated dose as \"1500mg\"     • meclizine (ANTIVERT) 25 MG tablet Take 1 tablet by mouth 3 (Three) Times a Day As " Needed for Dizziness. 90 tablet 0   • metoprolol succinate XL (TOPROL-XL) 25 MG 24 hr tablet TAKE 1 TABLET BY MOUTH EVERY DAY 30 tablet 6   • ondansetron (ZOFRAN) 4 MG tablet Take 1 tablet by mouth Every 8 (Eight) Hours As Needed for Nausea or Vomiting. 90 tablet 0   • ribociclib succinate 200 MG tablet therapy pack tablet Take 3 tablets by mouth Daily. For 21 days and then 7 days off 63 tablet 6     No current facility-administered medications on file prior to visit.       Allergies   Allergen Reactions   • Benadryl [Diphenhydramine] Itching     INCREASES BLOOD PRESSURE   • Erythromycin GI Intolerance   • Levaquin [Levofloxacin] GI Intolerance   • Penicillins GI Intolerance        Primary care MD:    Provider, No Known    Oncologist:  Patient Care Team:  Joby Barron Jr., MD as Referring Physician (General Surgery)  Elie Dixon MD as Consulting Physician (Hematology and Oncology)  Pedro Marmolejo MD as Consulting Physician (Radiation Oncology)       Seen and approved by:  Pedro Marmolejo MD  07/01/2021

## 2021-07-02 ENCOUNTER — DOCUMENTATION (OUTPATIENT)
Dept: RADIATION ONCOLOGY | Facility: HOSPITAL | Age: 61
End: 2021-07-02

## 2021-07-02 ENCOUNTER — MEDICATION THERAPY MANAGEMENT (OUTPATIENT)
Dept: PHARMACY | Facility: HOSPITAL | Age: 61
End: 2021-07-02

## 2021-07-02 PROCEDURE — 77336 RADIATION PHYSICS CONSULT: CPT | Performed by: RADIOLOGY

## 2021-07-02 PROCEDURE — 77386 CHG INTENSITY MODULATED RADIATION TX DLVR COMPLEX: CPT | Performed by: RADIOLOGY

## 2021-07-02 PROCEDURE — 77014 CHG CT GUIDANCE RADIATION THERAPY FLDS PLACEMENT: CPT | Performed by: RADIOLOGY

## 2021-07-02 PROCEDURE — 77386: CPT | Performed by: RADIOLOGY

## 2021-07-02 NOTE — PROGRESS NOTES
MT telephone encounter re Marshall    Called Ms Georges to remind her that rx from [facility] is available for .  She states she will come by this afternoon after radiation to retrieve it.     Ms Georges came by and picked up Marshall.  She stated she has some issues with diarrhea, and is using Imodium with some success. She also reports fatigue, but is managing to get out and stay active.   Otherwise, she no side effects to report. Pharmacy will continue to follow.

## 2021-07-06 ENCOUNTER — OFFICE VISIT (OUTPATIENT)
Dept: ORTHOPEDIC SURGERY | Facility: CLINIC | Age: 61
End: 2021-07-06

## 2021-07-06 VITALS — HEIGHT: 65 IN | TEMPERATURE: 97.5 F | WEIGHT: 162 LBS | BODY MASS INDEX: 26.99 KG/M2

## 2021-07-06 DIAGNOSIS — M16.12 ARTHRITIS OF LEFT HIP: Primary | ICD-10-CM

## 2021-07-06 PROCEDURE — S0260 H&P FOR SURGERY: HCPCS | Performed by: NURSE PRACTITIONER

## 2021-07-06 NOTE — H&P (VIEW-ONLY)
"   History & Physical       Patient: Marylu Georges  YOB: 1960  Medical Record Number: [id number]  Wt Readings from Last 3 Encounters:   07/06/21 73.5 kg (162 lb)   06/22/21 73.5 kg (162 lb 1.6 oz)   06/14/21 73 kg (160 lb 14.4 oz)     Ht Readings from Last 3 Encounters:   07/06/21 165.1 cm (65\")   06/22/21 165.1 cm (65\")   06/22/21 165.1 cm (65\")     Body mass index is 26.96 kg/m².  Facility age limit for growth percentiles is 20 years.    Surgeon:  Dr. Luis M Leonard    Chief Complaints:   Chief Complaint   Patient presents with   • Left Hip - Pre-op Exam     Surgery:   Posterior LEFT TOTAL HIP ARTHROPLASTY NATALY LIV    History of Present Illness: 61 y.o. female presents with   Chief Complaint   Patient presents with   • Left Hip - Pre-op Exam   . Chronic symptoms have been progressively worsening despite more conservative treatment measures including medications OTC and prescription, cortisone injections and or gel injections, and physical therapy.  Symptoms are associated with ability to move, exercise, and perform activities of daily living.  Symptoms are aggravated by weight bearing and ROM necessary for activities of daily living.   Symptoms improve with rest, ice and elevation only minimally.      Allergies:   Allergies   Allergen Reactions   • Benadryl [Diphenhydramine] Itching     INCREASES BLOOD PRESSURE   • Erythromycin GI Intolerance   • Levaquin [Levofloxacin] GI Intolerance   • Penicillins GI Intolerance       Medications:   Home Medications:  Current Outpatient Medications on File Prior to Visit   Medication Sig   • acetaminophen (TYLENOL) 650 MG 8 hr tablet Take 1,950 mg by mouth Daily.   • aspirin 81 MG chewable tablet Chew 81 mg Daily.   • Cholecalciferol 250 MCG (13865 UT) tablet Take 1 tablet by mouth. Patient stated dose as \"1500 mg\"   • Chromium 200 MCG tablet Take 1 tablet by mouth. Patient was unable to state dose   • diclofenac (VOLTAREN) 75 MG EC tablet TAKE ONE " "TABLET BY MOUTH TWICE A DAY   • letrozole (FEMARA) 2.5 MG tablet Take 1 tablet by mouth Daily.   • loperamide (IMODIUM) 2 MG capsule Take 2 mg by mouth 4 (Four) Times a Day As Needed for Diarrhea.   • magnesium oxide (MAG-OX) 400 MG tablet Take 400 mg by mouth 2 (two) times a day. Patient stated dose as \"1500mg\"   • meclizine (ANTIVERT) 25 MG tablet Take 1 tablet by mouth 3 (Three) Times a Day As Needed for Dizziness.   • metoprolol succinate XL (TOPROL-XL) 25 MG 24 hr tablet TAKE 1 TABLET BY MOUTH EVERY DAY   • ondansetron (ZOFRAN) 4 MG tablet Take 1 tablet by mouth Every 8 (Eight) Hours As Needed for Nausea or Vomiting.   • ribociclib succinate 200 MG tablet therapy pack tablet Take 3 tablets by mouth Daily. For 21 days and then 7 days off   • [DISCONTINUED] DM-Phenylephrine-Acetaminophen (UNA-SELTZER PLUS DAY COLD/FLU PO) Take 1 dose by mouth Daily.     No current facility-administered medications on file prior to visit.     Current Medications:  Scheduled Meds:  Continuous Infusions:No current facility-administered medications for this visit.    PRN Meds:.    I have reviewed the patient's medical history in detail and updated the computerized patient record.  Review and summarization of old records include:    Past Medical History:   Diagnosis Date   • Anemia in neoplastic disease    • Arthritis    • Breast cancer (CMS/HCC)     Left   • CVA (cerebral vascular accident) (CMS/HCC)    • Hip pain     RIGHT HIP... CYST   • History of fracture of leg 1987   • History of radiation therapy     LAST TREATMENT     • Hypertension    • Limited joint range of motion (ROM)     RIGHT HIP   • Skin sore     OPEN SORE LEFT BREAST   • Syncope    • Vertigo         Past Surgical History:   Procedure Laterality Date   • AXILLARY LYMPH NODE BIOPSY/EXCISION Right     LYMPH NODE UNDER RIGHT ARM-MALIGNANT (DOUBLE MASTECTOMY)   • BREAST BIOPSY Left     MALIGNANT   • FRACTURE SURGERY  1987    Leg   • HARDWARE REMOVAL     • " MASTECTOMY W/ SENTINEL NODE BIOPSY Bilateral 9/16/2019    Procedure: BILATERLA MODIFIED RADICAL MASTECTOMY WITH BILATERAL SENTINEL LYMPH NODE BIOPSY;  Surgeon: Joby Barron Jr., MD;  Location: [facility] OR;  Service: General   • TOTAL HIP ARTHROPLASTY Right 3/2/2020    Procedure: RIGHT TOTAL HIP ARTHROPLASTY NATALY NAVIGATION;  Surgeon: Luis M Leonard MD;  Location: [facility] OR;  Service: Orthopedics;  Laterality: Right;   • VENOUS ACCESS DEVICE (PORT) INSERTION Right 2/1/2019    Procedure: INSERTION VENOUS ACCESS DEVICE;  Surgeon: Joby Barron Jr., MD;  Location: [facility];  Service: General   • VENOUS ACCESS DEVICE (PORT) REMOVAL N/A 10/30/2019    Procedure: Mediport Removal;  Surgeon: Joby Barron Jr., MD;  Location: [facility] OR;  Service: General        Social History     Occupational History   • Occupation:      Employer: SELF-EMPLOYED   Tobacco Use   • Smoking status: Never Smoker   • Smokeless tobacco: Never Used   Substance and Sexual Activity   • Alcohol use: Not Currently     Alcohol/week: 7.0 standard drinks     Types: 4 Glasses of wine, 3 Cans of beer per week     Comment: 2 CUPS DAILY   • Drug use: No   • Sexual activity: Not Currently     Partners: Male     Birth control/protection: Post-menopausal      Social History     Social History Narrative   • Not on file        Family History   Problem Relation Age of Onset   • Lung cancer Father    • Cancer Mother    • Breast cancer Mother    • Liver disease Mother    • Breast cancer Sister 48   • Breast cancer Maternal Grandmother    • Breast cancer Paternal Grandmother    • Breast cancer Maternal Uncle    • Malig Hyperthermia Neg Hx        ROS: 14 point review of systems was performed and was negative except for documented findings in HPI and today's encounter.       Constitutional:  Denies fever, shaking or chills   Eyes:  Denies change in visual acuity   HENT:  Denies nasal congestion or sore throat   Respiratory:   "Denies cough or shortness of breath   Cardiovascular:  Denies chest pain or severe LE edema   GI:  Denies abdominal pain, nausea, vomiting, bloody stools or diarrhea   Musculoskeletal:  Denies numbness tingling or loss of motor function except as outlined above in history of present illness.  : Denies painful urination or hematuria  Integument:  Denies rash, lesion or ulceration   Neurologic:  Denies headache or focal weakness  Endocrine:  Denies lymphadenopathy  Psych:  Denies confusion or change in mental status   Hem:  Denies active bleeding    Physical Exam: 61 y.o. female  Wt Readings from Last 3 Encounters:   07/06/21 73.5 kg (162 lb)   06/22/21 73.5 kg (162 lb 1.6 oz)   06/14/21 73 kg (160 lb 14.4 oz)     Ht Readings from Last 3 Encounters:   07/06/21 165.1 cm (65\")   06/22/21 165.1 cm (65\")   06/22/21 165.1 cm (65\")     Body mass index is 26.96 kg/m².  Facility age limit for growth percentiles is 20 years.  Vitals:    07/06/21 1511   Temp: 97.5 °F (36.4 °C)       Vital signs reviewed.   General Appearance:    Alert, cooperative, in no acute distress                  Eyes: conjunctiva clear  ENT: external ears and nose atraumatic  CV: no peripheral edema  Resp: normal respiratory effort  Skin: no rashes or wounds; normal turgor  Psych: mood and affect appropriate  Lymph: no nodes appreciated  Neuro: gross sensation intact  Vascular:  Palpable peripheral pulse in noted extremity  Musculoskeletal Extremities: HIP Exam: normal gait without assistive device left hip 2+ pedal pulses and brisk capillary refill Pedal edema none        Diagnostic Tests:  Results for orders placed or performed in visit on 06/22/21   CBC Auto Differential    Specimen: Blood   Result Value Ref Range    WBC 2.66 (L) 3.40 - 10.80 10*3/mm3    RBC 3.76 (L) 3.77 - 5.28 10*6/mm3    Hemoglobin 11.9 (L) 12.0 - 15.9 g/dL    Hematocrit 37.4 34.0 - 46.6 %    MCV 99.5 (H) 79.0 - 97.0 fL    MCH 31.6 26.6 - 33.0 pg    MCHC 31.8 31.5 - 35.7 g/dL    " RDW 13.4 12.3 - 15.4 %    RDW-SD 48.1 37.0 - 54.0 fl    MPV 8.5 6.0 - 12.0 fL    Platelets 230 140 - 450 10*3/mm3    Neutrophil % 62.4 42.7 - 76.0 %    Lymphocyte % 27.8 19.6 - 45.3 %    Monocyte % 6.8 5.0 - 12.0 %    Eosinophil % 1.9 0.3 - 6.2 %    Basophil % 1.1 0.0 - 1.5 %    Immature Grans % 0.0 0.0 - 0.5 %    Neutrophils, Absolute 1.66 (L) 1.70 - 7.00 10*3/mm3    Lymphocytes, Absolute 0.74 0.70 - 3.10 10*3/mm3    Monocytes, Absolute 0.18 0.10 - 0.90 10*3/mm3    Eosinophils, Absolute 0.05 0.00 - 0.40 10*3/mm3    Basophils, Absolute 0.03 0.00 - 0.20 10*3/mm3    Immature Grans, Absolute 0.00 0.00 - 0.05 10*3/mm3    nRBC 0.0 0.0 - 0.2 /100 WBC     Lab Results   Component Value Date    GLUCOSE 95 06/22/2021    CALCIUM 9.5 06/22/2021     06/22/2021    K 4.9 (H) 06/22/2021    CO2 23.8 06/22/2021     06/22/2021    BUN 27 (H) 06/22/2021    CREATININE 1.01 06/22/2021    EGFRIFNONA 55 (L) 06/22/2021    BCR 26.7 06/22/2021    ANIONGAP 10.2 06/22/2021       EKG:    Cardiac clearance Dr. Odell 6/22/2021    I have reviewed all the lab & EKG results. There are some abnormalities that are not critical to the patient's health, but I would like to discuss these in person at an office appointment.     Imaging was done previously in the office, viewed images and discussed with the patient:    Indication: pain related symptoms,  Assessment:  Patient Active Problem List   Diagnosis   • Malignant neoplasm of overlapping sites of left breast in female, estrogen receptor positive (CMS/HCC)   • Family history of breast cancer in female   • Syncope   • Malignant neoplasm of overlapping sites of both breasts in female, estrogen receptor positive (CMS/HCC)   • Hypertension   • Arthritis of left hip   • Encounter for long-term (current) use of other medications       Plan:  Reviewed anatomy of a total joint arthroplasty in laymen's terms, as well as typical postoperative recovery and possibly 6-12 months for maximal recovery, and  possible need for rehabilitation stay after hospitalization. We also discussed risks, benefits, alternatives, and limitations of procedure with risks including but not limited to neurovascular damage, bleeding, infection, malalignment, chronic pian, failure of implants, osteolysis, loosening of implants, loss of motion, weakness, stiffness, instability, DVT, pulmonary embolus, death, stroke, complex regional pain syndrome, myocardial infarction, and need for additional procedures. Concept of substitution vs. replacement discussed.  No guarantees were given regarding results of surgery.      Marylu Georges was given the opportunity to ask and have all questions answered today.  The patient voiced understanding of the risks, benefits, and alternative forms of treatment that were discussed and the patient consents to proceed with surgery.     Skin breakdown? WNL  Metal allergy? No  DVT Risk Factors: hx breast ca, s/p radiation and currently on femara    DVT Prophylaxis:  Xarelto    Discharge Plan: POD 1 to home and home health    To be updated    Date: 7/6/2021  ISAIAS Crabtree

## 2021-07-06 NOTE — PROGRESS NOTES
"   History & Physical       Patient: Marylu Georges  YOB: 1960  Medical Record Number: [id number]  Wt Readings from Last 3 Encounters:   07/06/21 73.5 kg (162 lb)   06/22/21 73.5 kg (162 lb 1.6 oz)   06/14/21 73 kg (160 lb 14.4 oz)     Ht Readings from Last 3 Encounters:   07/06/21 165.1 cm (65\")   06/22/21 165.1 cm (65\")   06/22/21 165.1 cm (65\")     Body mass index is 26.96 kg/m².  Facility age limit for growth percentiles is 20 years.    Surgeon:  Dr. Luis M Leonard    Chief Complaints:   Chief Complaint   Patient presents with   • Left Hip - Pre-op Exam     Surgery:   Posterior LEFT TOTAL HIP ARTHROPLASTY NATALY LIV    History of Present Illness: 61 y.o. female presents with   Chief Complaint   Patient presents with   • Left Hip - Pre-op Exam   . Chronic symptoms have been progressively worsening despite more conservative treatment measures including medications OTC and prescription, cortisone injections and or gel injections, and physical therapy.  Symptoms are associated with ability to move, exercise, and perform activities of daily living.  Symptoms are aggravated by weight bearing and ROM necessary for activities of daily living.   Symptoms improve with rest, ice and elevation only minimally.      Allergies:   Allergies   Allergen Reactions   • Benadryl [Diphenhydramine] Itching     INCREASES BLOOD PRESSURE   • Erythromycin GI Intolerance   • Levaquin [Levofloxacin] GI Intolerance   • Penicillins GI Intolerance       Medications:   Home Medications:  Current Outpatient Medications on File Prior to Visit   Medication Sig   • acetaminophen (TYLENOL) 650 MG 8 hr tablet Take 1,950 mg by mouth Daily.   • aspirin 81 MG chewable tablet Chew 81 mg Daily.   • Cholecalciferol 250 MCG (18895 UT) tablet Take 1 tablet by mouth. Patient stated dose as \"1500 mg\"   • Chromium 200 MCG tablet Take 1 tablet by mouth. Patient was unable to state dose   • diclofenac (VOLTAREN) 75 MG EC tablet TAKE ONE " "TABLET BY MOUTH TWICE A DAY   • letrozole (FEMARA) 2.5 MG tablet Take 1 tablet by mouth Daily.   • loperamide (IMODIUM) 2 MG capsule Take 2 mg by mouth 4 (Four) Times a Day As Needed for Diarrhea.   • magnesium oxide (MAG-OX) 400 MG tablet Take 400 mg by mouth 2 (two) times a day. Patient stated dose as \"1500mg\"   • meclizine (ANTIVERT) 25 MG tablet Take 1 tablet by mouth 3 (Three) Times a Day As Needed for Dizziness.   • metoprolol succinate XL (TOPROL-XL) 25 MG 24 hr tablet TAKE 1 TABLET BY MOUTH EVERY DAY   • ondansetron (ZOFRAN) 4 MG tablet Take 1 tablet by mouth Every 8 (Eight) Hours As Needed for Nausea or Vomiting.   • ribociclib succinate 200 MG tablet therapy pack tablet Take 3 tablets by mouth Daily. For 21 days and then 7 days off   • [DISCONTINUED] DM-Phenylephrine-Acetaminophen (UNA-SELTZER PLUS DAY COLD/FLU PO) Take 1 dose by mouth Daily.     No current facility-administered medications on file prior to visit.     Current Medications:  Scheduled Meds:  Continuous Infusions:No current facility-administered medications for this visit.    PRN Meds:.    I have reviewed the patient's medical history in detail and updated the computerized patient record.  Review and summarization of old records include:    Past Medical History:   Diagnosis Date   • Anemia in neoplastic disease    • Arthritis    • Breast cancer (CMS/HCC)     Left   • CVA (cerebral vascular accident) (CMS/HCC)    • Hip pain     RIGHT HIP... CYST   • History of fracture of leg 1987   • History of radiation therapy     LAST TREATMENT     • Hypertension    • Limited joint range of motion (ROM)     RIGHT HIP   • Skin sore     OPEN SORE LEFT BREAST   • Syncope    • Vertigo         Past Surgical History:   Procedure Laterality Date   • AXILLARY LYMPH NODE BIOPSY/EXCISION Right     LYMPH NODE UNDER RIGHT ARM-MALIGNANT (DOUBLE MASTECTOMY)   • BREAST BIOPSY Left     MALIGNANT   • FRACTURE SURGERY  1987    Leg   • HARDWARE REMOVAL     • " MASTECTOMY W/ SENTINEL NODE BIOPSY Bilateral 9/16/2019    Procedure: BILATERLA MODIFIED RADICAL MASTECTOMY WITH BILATERAL SENTINEL LYMPH NODE BIOPSY;  Surgeon: Joby Barron Jr., MD;  Location: [facility] OR;  Service: General   • TOTAL HIP ARTHROPLASTY Right 3/2/2020    Procedure: RIGHT TOTAL HIP ARTHROPLASTY NATALY NAVIGATION;  Surgeon: Luis M Leonard MD;  Location: [facility] OR;  Service: Orthopedics;  Laterality: Right;   • VENOUS ACCESS DEVICE (PORT) INSERTION Right 2/1/2019    Procedure: INSERTION VENOUS ACCESS DEVICE;  Surgeon: Joby Barron Jr., MD;  Location: [facility];  Service: General   • VENOUS ACCESS DEVICE (PORT) REMOVAL N/A 10/30/2019    Procedure: Mediport Removal;  Surgeon: Joby Barron Jr., MD;  Location: [facility] OR;  Service: General        Social History     Occupational History   • Occupation:      Employer: SELF-EMPLOYED   Tobacco Use   • Smoking status: Never Smoker   • Smokeless tobacco: Never Used   Substance and Sexual Activity   • Alcohol use: Not Currently     Alcohol/week: 7.0 standard drinks     Types: 4 Glasses of wine, 3 Cans of beer per week     Comment: 2 CUPS DAILY   • Drug use: No   • Sexual activity: Not Currently     Partners: Male     Birth control/protection: Post-menopausal      Social History     Social History Narrative   • Not on file        Family History   Problem Relation Age of Onset   • Lung cancer Father    • Cancer Mother    • Breast cancer Mother    • Liver disease Mother    • Breast cancer Sister 48   • Breast cancer Maternal Grandmother    • Breast cancer Paternal Grandmother    • Breast cancer Maternal Uncle    • Malig Hyperthermia Neg Hx        ROS: 14 point review of systems was performed and was negative except for documented findings in HPI and today's encounter.       Constitutional:  Denies fever, shaking or chills   Eyes:  Denies change in visual acuity   HENT:  Denies nasal congestion or sore throat   Respiratory:   "Denies cough or shortness of breath   Cardiovascular:  Denies chest pain or severe LE edema   GI:  Denies abdominal pain, nausea, vomiting, bloody stools or diarrhea   Musculoskeletal:  Denies numbness tingling or loss of motor function except as outlined above in history of present illness.  : Denies painful urination or hematuria  Integument:  Denies rash, lesion or ulceration   Neurologic:  Denies headache or focal weakness  Endocrine:  Denies lymphadenopathy  Psych:  Denies confusion or change in mental status   Hem:  Denies active bleeding    Physical Exam: 61 y.o. female  Wt Readings from Last 3 Encounters:   07/06/21 73.5 kg (162 lb)   06/22/21 73.5 kg (162 lb 1.6 oz)   06/14/21 73 kg (160 lb 14.4 oz)     Ht Readings from Last 3 Encounters:   07/06/21 165.1 cm (65\")   06/22/21 165.1 cm (65\")   06/22/21 165.1 cm (65\")     Body mass index is 26.96 kg/m².  Facility age limit for growth percentiles is 20 years.  Vitals:    07/06/21 1511   Temp: 97.5 °F (36.4 °C)       Vital signs reviewed.   General Appearance:    Alert, cooperative, in no acute distress                  Eyes: conjunctiva clear  ENT: external ears and nose atraumatic  CV: no peripheral edema  Resp: normal respiratory effort  Skin: no rashes or wounds; normal turgor  Psych: mood and affect appropriate  Lymph: no nodes appreciated  Neuro: gross sensation intact  Vascular:  Palpable peripheral pulse in noted extremity  Musculoskeletal Extremities: HIP Exam: normal gait without assistive device left hip 2+ pedal pulses and brisk capillary refill Pedal edema none        Diagnostic Tests:  Results for orders placed or performed in visit on 06/22/21   CBC Auto Differential    Specimen: Blood   Result Value Ref Range    WBC 2.66 (L) 3.40 - 10.80 10*3/mm3    RBC 3.76 (L) 3.77 - 5.28 10*6/mm3    Hemoglobin 11.9 (L) 12.0 - 15.9 g/dL    Hematocrit 37.4 34.0 - 46.6 %    MCV 99.5 (H) 79.0 - 97.0 fL    MCH 31.6 26.6 - 33.0 pg    MCHC 31.8 31.5 - 35.7 g/dL    " RDW 13.4 12.3 - 15.4 %    RDW-SD 48.1 37.0 - 54.0 fl    MPV 8.5 6.0 - 12.0 fL    Platelets 230 140 - 450 10*3/mm3    Neutrophil % 62.4 42.7 - 76.0 %    Lymphocyte % 27.8 19.6 - 45.3 %    Monocyte % 6.8 5.0 - 12.0 %    Eosinophil % 1.9 0.3 - 6.2 %    Basophil % 1.1 0.0 - 1.5 %    Immature Grans % 0.0 0.0 - 0.5 %    Neutrophils, Absolute 1.66 (L) 1.70 - 7.00 10*3/mm3    Lymphocytes, Absolute 0.74 0.70 - 3.10 10*3/mm3    Monocytes, Absolute 0.18 0.10 - 0.90 10*3/mm3    Eosinophils, Absolute 0.05 0.00 - 0.40 10*3/mm3    Basophils, Absolute 0.03 0.00 - 0.20 10*3/mm3    Immature Grans, Absolute 0.00 0.00 - 0.05 10*3/mm3    nRBC 0.0 0.0 - 0.2 /100 WBC     Lab Results   Component Value Date    GLUCOSE 95 06/22/2021    CALCIUM 9.5 06/22/2021     06/22/2021    K 4.9 (H) 06/22/2021    CO2 23.8 06/22/2021     06/22/2021    BUN 27 (H) 06/22/2021    CREATININE 1.01 06/22/2021    EGFRIFNONA 55 (L) 06/22/2021    BCR 26.7 06/22/2021    ANIONGAP 10.2 06/22/2021       EKG:    Cardiac clearance Dr. Odell 6/22/2021    I have reviewed all the lab & EKG results. There are some abnormalities that are not critical to the patient's health, but I would like to discuss these in person at an office appointment.     Imaging was done previously in the office, viewed images and discussed with the patient:    Indication: pain related symptoms,  Assessment:  Patient Active Problem List   Diagnosis   • Malignant neoplasm of overlapping sites of left breast in female, estrogen receptor positive (CMS/HCC)   • Family history of breast cancer in female   • Syncope   • Malignant neoplasm of overlapping sites of both breasts in female, estrogen receptor positive (CMS/HCC)   • Hypertension   • Arthritis of left hip   • Encounter for long-term (current) use of other medications       Plan:  Reviewed anatomy of a total joint arthroplasty in laymen's terms, as well as typical postoperative recovery and possibly 6-12 months for maximal recovery, and  possible need for rehabilitation stay after hospitalization. We also discussed risks, benefits, alternatives, and limitations of procedure with risks including but not limited to neurovascular damage, bleeding, infection, malalignment, chronic pian, failure of implants, osteolysis, loosening of implants, loss of motion, weakness, stiffness, instability, DVT, pulmonary embolus, death, stroke, complex regional pain syndrome, myocardial infarction, and need for additional procedures. Concept of substitution vs. replacement discussed.  No guarantees were given regarding results of surgery.      Marylu Georges was given the opportunity to ask and have all questions answered today.  The patient voiced understanding of the risks, benefits, and alternative forms of treatment that were discussed and the patient consents to proceed with surgery.     Skin breakdown? WNL  Metal allergy? No  DVT Risk Factors: hx breast ca, s/p radiation and currently on femara    DVT Prophylaxis:  Xarelto    Discharge Plan: POD 1 to home and home health    To be updated    Date: 7/6/2021  ISAIAS Crabtree

## 2021-07-07 ENCOUNTER — LAB (OUTPATIENT)
Dept: LAB | Facility: HOSPITAL | Age: 61
End: 2021-07-07

## 2021-07-07 ENCOUNTER — OFFICE VISIT (OUTPATIENT)
Dept: ONCOLOGY | Facility: CLINIC | Age: 61
End: 2021-07-07

## 2021-07-07 ENCOUNTER — APPOINTMENT (OUTPATIENT)
Dept: ONCOLOGY | Facility: HOSPITAL | Age: 61
End: 2021-07-07

## 2021-07-07 ENCOUNTER — CLINICAL SUPPORT (OUTPATIENT)
Dept: ONCOLOGY | Facility: HOSPITAL | Age: 61
End: 2021-07-07

## 2021-07-07 VITALS
DIASTOLIC BLOOD PRESSURE: 86 MMHG | HEIGHT: 65 IN | SYSTOLIC BLOOD PRESSURE: 133 MMHG | RESPIRATION RATE: 16 BRPM | HEART RATE: 60 BPM | TEMPERATURE: 97.1 F | OXYGEN SATURATION: 98 % | WEIGHT: 162.8 LBS | BODY MASS INDEX: 27.12 KG/M2

## 2021-07-07 VITALS — RESPIRATION RATE: 60 BRPM

## 2021-07-07 DIAGNOSIS — Z79.899 ENCOUNTER FOR LONG-TERM (CURRENT) USE OF MEDICATIONS: ICD-10-CM

## 2021-07-07 DIAGNOSIS — Z17.0 MALIGNANT NEOPLASM OF OVERLAPPING SITES OF LEFT BREAST IN FEMALE, ESTROGEN RECEPTOR POSITIVE (HCC): Primary | ICD-10-CM

## 2021-07-07 DIAGNOSIS — C50.812 MALIGNANT NEOPLASM OF OVERLAPPING SITES OF LEFT BREAST IN FEMALE, ESTROGEN RECEPTOR POSITIVE (HCC): Primary | ICD-10-CM

## 2021-07-07 DIAGNOSIS — Z79.899 ENCOUNTER FOR LONG-TERM (CURRENT) USE OF OTHER MEDICATIONS: ICD-10-CM

## 2021-07-07 DIAGNOSIS — M16.12 ARTHRITIS OF LEFT HIP: ICD-10-CM

## 2021-07-07 DIAGNOSIS — I10 ESSENTIAL HYPERTENSION: ICD-10-CM

## 2021-07-07 LAB
ALBUMIN SERPL-MCNC: 4.5 G/DL (ref 3.5–5.2)
ALBUMIN/GLOB SERPL: 2 G/DL (ref 1.1–2.4)
ALP SERPL-CCNC: 99 U/L (ref 38–116)
ALT SERPL W P-5'-P-CCNC: 20 U/L (ref 0–33)
ANION GAP SERPL CALCULATED.3IONS-SCNC: 11.2 MMOL/L (ref 5–15)
AST SERPL-CCNC: 23 U/L (ref 0–32)
BASOPHILS # BLD AUTO: 0.05 10*3/MM3 (ref 0–0.2)
BASOPHILS NFR BLD AUTO: 2.1 % (ref 0–1.5)
BILIRUB SERPL-MCNC: 0.2 MG/DL (ref 0.2–1.2)
BUN SERPL-MCNC: 27 MG/DL (ref 6–20)
BUN/CREAT SERPL: 24.5 (ref 7.3–30)
CALCIUM SPEC-SCNC: 9.5 MG/DL (ref 8.5–10.2)
CANCER AG15-3 SERPL-ACNC: 16.8 U/ML
CHLORIDE SERPL-SCNC: 106 MMOL/L (ref 98–107)
CO2 SERPL-SCNC: 23.8 MMOL/L (ref 22–29)
CREAT SERPL-MCNC: 1.1 MG/DL (ref 0.6–1.1)
DEPRECATED RDW RBC AUTO: 51.8 FL (ref 37–54)
EOSINOPHIL # BLD AUTO: 0.03 10*3/MM3 (ref 0–0.4)
EOSINOPHIL NFR BLD AUTO: 1.2 % (ref 0.3–6.2)
ERYTHROCYTE [DISTWIDTH] IN BLOOD BY AUTOMATED COUNT: 14.3 % (ref 12.3–15.4)
GFR SERPL CREATININE-BSD FRML MDRD: 50 ML/MIN/1.73
GLOBULIN UR ELPH-MCNC: 2.3 GM/DL (ref 1.8–3.5)
GLUCOSE SERPL-MCNC: 94 MG/DL (ref 74–124)
HCT VFR BLD AUTO: 38 % (ref 34–46.6)
HGB BLD-MCNC: 12.2 G/DL (ref 12–15.9)
IMM GRANULOCYTES # BLD AUTO: 0.01 10*3/MM3 (ref 0–0.05)
IMM GRANULOCYTES NFR BLD AUTO: 0.4 % (ref 0–0.5)
LYMPHOCYTES # BLD AUTO: 0.68 10*3/MM3 (ref 0.7–3.1)
LYMPHOCYTES NFR BLD AUTO: 28.2 % (ref 19.6–45.3)
MCH RBC QN AUTO: 31.7 PG (ref 26.6–33)
MCHC RBC AUTO-ENTMCNC: 32.1 G/DL (ref 31.5–35.7)
MCV RBC AUTO: 98.7 FL (ref 79–97)
MONOCYTES # BLD AUTO: 0.35 10*3/MM3 (ref 0.1–0.9)
MONOCYTES NFR BLD AUTO: 14.5 % (ref 5–12)
NEUTROPHILS NFR BLD AUTO: 1.29 10*3/MM3 (ref 1.7–7)
NEUTROPHILS NFR BLD AUTO: 53.6 % (ref 42.7–76)
NRBC BLD AUTO-RTO: 0 /100 WBC (ref 0–0.2)
PLATELET # BLD AUTO: 190 10*3/MM3 (ref 140–450)
PMV BLD AUTO: 8.5 FL (ref 6–12)
POTASSIUM SERPL-SCNC: 4.4 MMOL/L (ref 3.5–4.7)
PROT SERPL-MCNC: 6.8 G/DL (ref 6.3–8)
RBC # BLD AUTO: 3.85 10*6/MM3 (ref 3.77–5.28)
SODIUM SERPL-SCNC: 141 MMOL/L (ref 134–145)
WBC # BLD AUTO: 2.41 10*3/MM3 (ref 3.4–10.8)

## 2021-07-07 PROCEDURE — 80053 COMPREHEN METABOLIC PANEL: CPT

## 2021-07-07 PROCEDURE — 36415 COLL VENOUS BLD VENIPUNCTURE: CPT

## 2021-07-07 PROCEDURE — 93005 ELECTROCARDIOGRAM TRACING: CPT

## 2021-07-07 PROCEDURE — 85025 COMPLETE CBC W/AUTO DIFF WBC: CPT

## 2021-07-07 PROCEDURE — 93010 ELECTROCARDIOGRAM REPORT: CPT | Performed by: INTERNAL MEDICINE

## 2021-07-07 PROCEDURE — 99215 OFFICE O/P EST HI 40 MIN: CPT | Performed by: INTERNAL MEDICINE

## 2021-07-07 PROCEDURE — 86300 IMMUNOASSAY TUMOR CA 15-3: CPT | Performed by: INTERNAL MEDICINE

## 2021-07-07 NOTE — PROGRESS NOTES
Subjective     REASON FOR FOLLOW UP:  1. 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 R BREAST MASS WITH GIANT R AXILLARY ADENOPATHY.  3. FAMILY HISTORY OF BREAST CANCER IN MOTHER AND SISTER, SISTER WAS TESTED FOR BRCA AND WAS NEGATIVE.  4. LEFT OVARIAN CYST , IT HAS BEEN PRESENT FOR LONG TIME BUT IS GETTING LARGER, ASYMPTOMATIC, NEED FOR , PELVIC US AND GYN ONC EVEAL                 5. LEFT HIP PAIN SEVERE ARTHRITIS, REAL NEED FOR LEFT HIP REPLACEMENT.    History of Present Illness       DURING THE VISIT WITH THE PATIENT TODAY , PATIENT HAD FACE MASK, MY MEDICAL ASSISTANT AND I  HAD PROPPER PROTECTIVE EQUIPMENT, AND I DID HAND HYGIENE WITH SOAP AND WATER BEFORE AND AFTER THE VISIT.    This patient returns today to the office after she had received Kisqali for almost a month without any significant side effects of the medicine including no nausea, vomiting, cough, sputum production or shortness of breath. She has completed her radiation therapy to the lymph node documented radiologically in the epicardial recess on the right side with no side effects of the radiation treatment. She is ready to proceed in a few days with her left hip replacement. She continues limping and having discomfort in the left hip to the point that she is having a hard time functioning. Her appetite is acceptable. Her weight is stable. Her bowel function and urination are normal. No skin lesions. She has not had any other new problems and she remains on Femara continuously at 100% compliance. She remains on Voltaren and she continues taking her blood pressure medication.          Past Medical History:   Diagnosis Date   • Anemia in neoplastic disease    • Arthritis    • Breast cancer (CMS/HCC)     Left   • CVA (cerebral vascular accident) (CMS/HCC)    • Hip pain     RIGHT HIP... CYST   • History of fracture of leg 1987   • History of radiation therapy     LAST TREATMENT     • Hypertension    • Limited  "joint range of motion (ROM)     RIGHT HIP   • Skin sore     OPEN SORE LEFT BREAST   • Syncope    • Vertigo            Past Surgical History:   Procedure Laterality Date   • AXILLARY LYMPH NODE BIOPSY/EXCISION Right     LYMPH NODE UNDER RIGHT ARM-MALIGNANT (DOUBLE MASTECTOMY)   • BREAST BIOPSY Left     MALIGNANT   • FRACTURE SURGERY  1987    Leg   • HARDWARE REMOVAL     • MASTECTOMY W/ SENTINEL NODE BIOPSY Bilateral 9/16/2019    Procedure: BILATERLA MODIFIED RADICAL MASTECTOMY WITH BILATERAL SENTINEL LYMPH NODE BIOPSY;  Surgeon: Joby Barron Jr., MD;  Location: [facility] OR;  Service: General   • TOTAL HIP ARTHROPLASTY Right 3/2/2020    Procedure: RIGHT TOTAL HIP ARTHROPLASTY NATALY NAVIGATION;  Surgeon: Luis M Leonard MD;  Location: [facility] OR;  Service: Orthopedics;  Laterality: Right;   • VENOUS ACCESS DEVICE (PORT) INSERTION Right 2/1/2019    Procedure: INSERTION VENOUS ACCESS DEVICE;  Surgeon: Joby Barron Jr., MD;  Location: [facility];  Service: General   • VENOUS ACCESS DEVICE (PORT) REMOVAL N/A 10/30/2019    Procedure: Mediport Removal;  Surgeon: Joby Barron Jr., MD;  Location: [facility] OR;  Service: General        Current Outpatient Medications on File Prior to Visit   Medication Sig Dispense Refill   • acetaminophen (TYLENOL) 650 MG 8 hr tablet Take 1,950 mg by mouth Daily.     • aspirin 81 MG chewable tablet Chew 81 mg Daily.     • Cholecalciferol 250 MCG (45888 UT) tablet Take 1 tablet by mouth. Patient stated dose as \"1500 mg\"     • Chromium 200 MCG tablet Take 1 tablet by mouth. Patient was unable to state dose     • diclofenac (VOLTAREN) 75 MG EC tablet TAKE ONE TABLET BY MOUTH TWICE A DAY 60 tablet 2   • letrozole (FEMARA) 2.5 MG tablet Take 1 tablet by mouth Daily. 90 tablet 2   • loperamide (IMODIUM) 2 MG capsule Take 2 mg by mouth 4 (Four) Times a Day As Needed for Diarrhea.     • magnesium oxide (MAG-OX) 400 MG tablet Take 400 mg by mouth 2 (two) times a day. Patient " "stated dose as \"1500mg\"     • meclizine (ANTIVERT) 25 MG tablet Take 1 tablet by mouth 3 (Three) Times a Day As Needed for Dizziness. 90 tablet 0   • metoprolol succinate XL (TOPROL-XL) 25 MG 24 hr tablet TAKE 1 TABLET BY MOUTH EVERY DAY 30 tablet 6   • ondansetron (ZOFRAN) 4 MG tablet Take 1 tablet by mouth Every 8 (Eight) Hours As Needed for Nausea or Vomiting. 90 tablet 0   • ribociclib succinate 200 MG tablet therapy pack tablet Take 3 tablets by mouth Daily. For 21 days and then 7 days off 63 tablet 6     No current facility-administered medications on file prior to visit.        ALLERGIES:    Allergies   Allergen Reactions   • Benadryl [Diphenhydramine] Itching     INCREASES BLOOD PRESSURE   • Erythromycin GI Intolerance   • Levaquin [Levofloxacin] GI Intolerance   • Penicillins GI Intolerance        Social History     Socioeconomic History   • Marital status:      Spouse name: Cruz   • Number of children: 0   • Years of education: Not on file   • Highest education level: Not on file   Tobacco Use   • Smoking status: Never Smoker   • Smokeless tobacco: Never Used   Substance and Sexual Activity   • Alcohol use: Not Currently     Alcohol/week: 7.0 standard drinks     Types: 4 Glasses of wine, 3 Cans of beer per week     Comment: 2 CUPS DAILY   • Drug use: No   • Sexual activity: Not Currently     Partners: Male     Birth control/protection: Post-menopausal        Family History   Problem Relation Age of Onset   • Lung cancer Father    • Cancer Mother    • Breast cancer Mother    • Liver disease Mother    • Breast cancer Sister 48   • Breast cancer Maternal Grandmother    • Breast cancer Paternal Grandmother    • Breast cancer Maternal Uncle    • Malig Hyperthermia Neg Hx             Objective     There were no vitals filed for this visit.  Current Status 6/14/2021   ECOG score 0     Exam:         I HAVE PERSONALLY REVIEWED THE HISTORY OF THE PRESENT ILLNESS, PAST MEDICAL HISTORY, FAMILY HISTORY, SOCIAL " HISTORY, ALLERGIES, MEDICATIONS STATED ABOVE IN THE OFFICE NOTE FROM TODAY.        GENERAL:  Well-developed, well-nourished  Patient  in no acute distress.   SKIN:  Warm, dry ,NO rashes,NO purpura ,NO petechiae.  HEENT:  Pupils were equal and reactive to light and accomodation, conjunctivae noninjected, no pterygium, normal extraocular movements, normal visual acuity.   NECK:  Supple with good range of motion; no thyromegaly or masses, no JVD or bruits, no cervical adenopathies.No carotid artery pain, no carotid abnormal pulsation , NO arterial dance.  LYMPHATICS:  No cervical, NO supraclavicular, NO axillary,NO epitrochlear , NO inguinal adenopathy.  CARDIAC   normal rate and regular rhythm, without murmur,NO rubs NO S3 NO S4 right or left .   LUNGS: NORMAL BS BILATERAL.  CHEST WALL NO MASSES OR NODULARITY BILATERAL MASTECTOMIES  VASCULAR VENOUS: no cyanosis, collateral circulation, varicosities, edema, palpable cords, pain, erythema.  ABDOMEN:  Soft, nontender with no hepatomegaly, no splenomegaly,no masses, no ascites, no collateral circulation,no distention,no Vernon sign, no abdominal pain, no inguinal hernias,no umbilical hernia, no abdominal bruits. Normal bowel sounds.  GENITAL: Not  Performed.  EXTREMITIES  AND SPINE:  No clubbing, cyanosis or edema, no deformities LEFT HIP pain .No kyphosis, scoliosis, deformities or pain in spine, ribs or pelvic bone.  NEUROLOGICAL:  Patient was awake, alert, oriented to time, person and place.          RECENT LABS:  Hematology WBC   Date Value Ref Range Status   07/07/2021 2.41 (L) 3.40 - 10.80 10*3/mm3 Final     RBC   Date Value Ref Range Status   07/07/2021 3.85 3.77 - 5.28 10*6/mm3 Final     Hemoglobin   Date Value Ref Range Status   07/07/2021 12.2 12.0 - 15.9 g/dL Final     Hematocrit   Date Value Ref Range Status   07/07/2021 38.0 34.0 - 46.6 % Final     Platelets   Date Value Ref Range Status   07/07/2021 190 140 - 450 10*3/mm3 Final                    Assessment/Plan    1.  History of least for the last 4 years, she has had an in crescendo mass in the left breast that has produced a gigantic tumor that WAS close to 25 cm in size and is replacing most of the anatomy of the left breast. There are areas of ulceration necrosis and bleeding and the mass is fixed to the chest wall. She has abundant amount of collateral circulation in the left anterior chest wall and it caught my attention the lack of any left axillary adenopathy. She has no lymphedema in the left upper extremity. In the right breast while in sitting position, no palpable abnormalities are documented. The right breast skin and nipple are normal. When the patient lies flat, there is a palpable mass at 9 o'clock from the nipple that measures probably 4 cm in size, very indistinct in regard to edges and margins. There was no mobility and no areas of tenderness. She has a giant adenopathy in the right axilla that measures close to 9 cm in size, uniform, no fluctuation, nontender, completely fixed to the axillary wall. There is no compromise of the skin.     After completion of 4 cycles of AC patient has had very dramatic improvement in all sites of disease including right axilla and left breast.    · Completed 4 cycles Adriamycin Cytoxan on 4/12/2019.    · Patient started weekly Taxol treatments on 5/3/2019.  · Completed 12 weekly Taxol treatments on 7/19/2019.  · 9/16/2019 bilateral mastectomy by Dr. Joby Barron with axillary lymph node dissection.  No residual malignancy.  · 10/30/2019 patient's Mediport was removed in anticipation of radiation.  · Radiation therapy under the care of Dr. Marmolejo 10/31/2019-1/13/2020 to the right chest wall.  · Started on adjuvant Femara 2.5 mg daily 8/20/2019.  · 9/21/2020 continues on adjuvant Femara, tolerating it quite well.  Some mild hot flashes and mild arthralgias, all which are tolerable.  I advised the patient that at this point her breast cancer remains in  remission. She is 100% compliant of her medication letrozole and her clinical examination remains negative normal for breast cancer recurrence.   The patient was further reviewed on 04/16/2021. Her clinical examination is completely negative normal and her questioning is negative in regard to symptoms that would suggest breast cancer recurrence. She is 100% compliant with her Femara. Her white count, hemoglobin and platelets remain normal. Her tumor marker during the previous visit was normal and we have another one pending today CA 15-3.   The patient was further reviewed on 05/17/2021 along with her brother. The clinical examination has not changed. The only new complaint is the progressive amount of discomfort and the inability to function given the pain in the left hip area. Now she is limping. The only time when she is not in discomfort with the left hip is when she is sitting or lying in bed or riding the horse when gravity takes away the pressure of her left hip area. Radiologically speaking the CT scan of the chest, abdomen and pelvis to my eyes disclosed no abnormalities besides a very simple ovarian cyst that measures close to 7 x 5 cm with no trabeculation. There is no pelvic ascites. There is no pelvic adenopathy. The other abnormality obviously visible is the severe degree of scoliosis of the thoracic, lumbar spines.     It caught my attention the subchondral cyst in the left hip and the minimal if any space leftover between the head of the femur and the acetabulum. There is no metastasis in this anatomical site.     The radiologist mentioned cardiophrenic angle lymphadenopathy. I cannot see that from my naked eyes. I do not know what it is and I do not know how to express it. Pulmonary anatomy is normal. Hilar adenopathy is normal. No pericardial effusion. No pleural effusion. Minimal infiltrate in the lungs related to radiation changes. Liver anatomy, pancreas and kidneys were unremarkable. The  scoliosis is very obvious in the view of the abdomen.     Her CA 15-3 was minimal elevated in comparison to previous assessment and it raises the following question.   1. Is the cardiophrenic node described by the radiologist indeed significant of breast cancer recurrence or not? The only way to know will be to do a PET scan. This will be scheduled.   2. She is known for having an ovarian cyst for a long time but now is getting bigger, 7 cm across, and has no TABICATION with no pelvic ascites. I do not believe that this is representation of malignancy; nevertheless, I am going to run a CA-125 on her and I will proceed with a pelvic ultrasound as well as a GYN oncology referral for review.   3. I will send a notification to Dr. Leonard, the patient’s orthopedic surgeon, in regard to all her issues pertinent to the left hip. I think the patient is getting closer and closer to having a left hip replacement. 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 She recovered extremely well from her right hip surgery before.     The patient returned to the office on 05/24/2021. Since the previous visit the patient proceeded with a PET scan and I have reviewed this with her in the PAC system showing chest wall on the left side area of enlightening SUV activity that is almost 3 cm long x 7 mm wide. It is behind the bone. It is very close to the lower aspect of her heart. The reset of the PET scan discloses no activity. This is also specific in regard to the ovaries and actually an ultrasound of her vagina looking into the ovaries documented an unilocular cyst on the left side with typical features of benignity and a  was completely normal 5.5.     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 The rest of the PET scan is very much negative normal. I would not be surprised if this is an area of the internal mammary node chain.     Obviously the ovarian cyst is benign only locular with a normal  and I find no reason to refer her to GYN oncology anymore.     Therefore my advice to her is as follows:  1. She will remain on her letrozole 2.5 mg a day.  2. She will initiate Kisqali at the usual dose 3 weeks on 1 week off making her aware of the side effects of the medicine including leukopenia, nausea, vomiting, rash, diarrhea, abnormalities in the liver function test and neutropenic fever. She will take the medicine at least for the next 6 months.  3. The patient will proceed with referral to radiation oncology to treat this area completely.  4. I am going to go ahead and make a referral to Cardiology in preparation for this site of radiation therapy and knowing that tangential fields will be difficult to produce, I think it will be important to have her to be seen by Cardiology in preparation of Kisqali use. Also I advised her that I would like for her to take a magnesium supplement and she needs to eat plenty of nuts to minimize hypomagnesemia that can help her to prolong QT interval that is the most important side effect of Kisqali to my eyes. I advised her to continue her blood pressure medication and I advised her also to have an EKG today and also have a repeated EKG upon return in 3 weeks.    5. The patient will be having formal education and consent for Kisqali and she knows that this medicine will come to her from a speciality pharmacy.   6. The patient will require to have a repeat PET scan at least 6-8 weeks after completion of her radiation therapy to the chest wall.   7. I advised her and her brother present on the telephone of all of these events and she was ready to proceed.     The patient was further reviewed on 07/07/2021  after she has continued her Kisqali and completion of the 1st round of the medicine. Today her EKG disclosed a normal QT interval and this has been sent to Cardiology to be reported officially. 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 She has completed her radiation therapy to the epicardial right lymph node with no difficulties or side effects.     The thing that is bothering her the most is the pain in the left hip associated with advanced degenerative arthritis and she is limping substantially and having discomfort requiring to take pain medicine, Voltaren, on a regular schedule. She already has been scheduled to have her hip replaced in a few days. In preparation for this I have advised the patient the following.   1. She will discontinue her aspirin and her nonsteroidal anti-inflammatory medications a week in advance for her surgery. This will minimize the potential for bleeding.   2. The patient will hold off on the Kisqali until I review her back in the office in 6 weeks. She will require a blood count done 3 days before the surgery at the same time that she will require her official COVID test before the surgical intervention.   3. The patient will remain on Femara for the time being at 2.5 mg a day. She has no need to stop this medication anytime besides the day of the surgery. She will resume this after the surgery and as soon as she is able to receive oral route.   4. Eventually the patient will require radiological assessment upon review in order to see what happened to the epicardial lymph node.     I would like to review this patient in 6 weeks or so. By then she should have completed her surgery and be able to function. I know how strong she is and she is planning to rehab in her own house. Her  is coming to see her and to help her during that difficult time.     In regard to her blood pressure medication, she will remain on her hypertension medication,  metoprolol, until the day before the surgery.     In regard to her hypomagnesemia, she will remain on magnesium replacement therapy at the same level as before with no modifications on this.     In regard to her bone density issues, she will remain on her vitamin D tablet as previously stated.     I discussed all these facts with the patient and her brother present in the room. I reviewed the note by Radiation Oncology.     I reviewed the note by Orthopedic Medicine.    ·

## 2021-07-08 ENCOUNTER — MEDICATION THERAPY MANAGEMENT (OUTPATIENT)
Dept: PHARMACY | Facility: HOSPITAL | Age: 61
End: 2021-07-08

## 2021-07-08 ENCOUNTER — PRE-ADMISSION TESTING (OUTPATIENT)
Dept: PREADMISSION TESTING | Facility: HOSPITAL | Age: 61
End: 2021-07-08

## 2021-07-08 VITALS
SYSTOLIC BLOOD PRESSURE: 138 MMHG | TEMPERATURE: 98.5 F | DIASTOLIC BLOOD PRESSURE: 98 MMHG | HEART RATE: 68 BPM | RESPIRATION RATE: 16 BRPM | OXYGEN SATURATION: 97 % | HEIGHT: 65 IN | BODY MASS INDEX: 27.52 KG/M2 | WEIGHT: 165.2 LBS

## 2021-07-08 DIAGNOSIS — M16.12 ARTHRITIS OF LEFT HIP: ICD-10-CM

## 2021-07-08 LAB
BILIRUB UR QL STRIP: NEGATIVE
CLARITY UR: CLEAR
COLOR UR: YELLOW
GLUCOSE UR STRIP-MCNC: NEGATIVE MG/DL
HGB UR QL STRIP.AUTO: NEGATIVE
KETONES UR QL STRIP: NEGATIVE
LEUKOCYTE ESTERASE UR QL STRIP.AUTO: NEGATIVE
NITRITE UR QL STRIP: NEGATIVE
PH UR STRIP.AUTO: <=5 [PH] (ref 5–8)
PROT UR QL STRIP: NEGATIVE
SP GR UR STRIP: 1.02 (ref 1–1.03)
UROBILINOGEN UR QL STRIP: NORMAL

## 2021-07-08 PROCEDURE — 81003 URINALYSIS AUTO W/O SCOPE: CPT

## 2021-07-08 RX ORDER — CHLORHEXIDINE GLUCONATE 500 MG/1
CLOTH TOPICAL
Status: ON HOLD | COMMUNITY
End: 2021-07-20

## 2021-07-08 RX ORDER — CHLORHEXIDINE GLUCONATE 500 MG/1
1 CLOTH TOPICAL TAKE AS DIRECTED
Status: ACTIVE | OUTPATIENT
Start: 2021-07-08

## 2021-07-08 ASSESSMENT — HOOS JR
HOOS JR SCORE: 10
HOOS JR SCORE: 58.93

## 2021-07-08 NOTE — DISCHARGE INSTRUCTIONS
Take the following medications the morning of surgery:  DICLOFENAC, FEMARA, METOPROLOL    ARRIVAL TIME: 630AM        If you are on prescription narcotic pain medication to control your pain you may also take that medication the morning of surgery.    General Instructions:  • Do not eat solid food after midnight the night before surgery.  • You may drink clear liquids day of surgery but must stop at least one hour before your hospital arrival time. 530AM  • It is beneficial for you to have a clear drink that contains carbohydrates the day of surgery.  We suggest a 12 to 20 ounce bottle of Gatorade or Powerade for non-diabetic patients or a 12 to 20 ounce bottle of G2 or Powerade Zero for diabetic patients. (Pediatric patients, are not advised to drink a 12 to 20 ounce carbohydrate drink)    Clear liquids are liquids you can see through.  Nothing red in color.     Plain water                               Sports drinks  Sodas                                   Gelatin (Jell-O)  Fruit juices without pulp such as white grape juice and apple juice  Popsicles that contain no fruit or yogurt  Tea or coffee (no cream or milk added)  Gatorade / Powerade  G2 / Powerade Zero      • Bring any papers given to you in the doctor’s office.  • Wear clean comfortable clothes.  • Do not wear contact lenses, false eyelashes or make-up.  Bring a case for your glasses.   • Remove all piercings.  Leave jewelry and any other valuables at home.  • Hair extensions with metal clips must be removed prior to surgery.  • The Pre-Admission Testing nurse will instruct you to bring medications if unable to obtain an accurate list in Pre-Admission Testing.        Preventing a Surgical Site Infection:  • For 2 to 3 days before surgery, avoid shaving with a razor because the razor can irritate skin and make it easier to develop an infection.    • Any areas of open skin can increase the risk of a post-operative wound infection by allowing bacteria to  enter and travel throughout the body.  Notify your surgeon if you have any skin wounds / rashes even if it is not near the expected surgical site.  The area will need assessed to determine if surgery should be delayed until it is healed.  • The night prior to surgery shower using a fresh bar of anti-bacterial soap (such as Dial) and clean washcloth.  Sleep in a clean bed with clean clothing.  Do not allow pets to sleep with you.  • Shower on the morning of surgery using a fresh bar of anti-bacterial soap (such as Dial) and clean washcloth.  Dry with a clean towel and dress in clean clothing.  • Ask your surgeon if you will be receiving antibiotics prior to surgery.  • Make sure you, your family, and all healthcare providers clean their hands with soap and water or an alcohol based hand  before caring for you or your wound.    Day of surgery:  Your arrival time is approximately two hours before your scheduled surgery time.  Upon arrival, a Pre-op nurse and Anesthesiologist will review your health history, obtain vital signs, and answer questions you may have.  The only belongings needed at this time will be a list of your home medications and if applicable your C-PAP/BI-PAP machine.  A Pre-op nurse will start an IV and you may receive medication in preparation for surgery, including something to help you relax.     Please be aware that surgery does come with discomfort.  We want to make every effort to control your discomfort so please discuss any uncontrolled symptoms with your nurse.   Your doctor will most likely have prescribed pain medications.      If you are going home after surgery you will receive individualized written care instructions before being discharged.  A responsible adult must drive you to and from the hospital on the day of your surgery and stay with you for 24 hours.  Discharge prescriptions can be filled by the hospital pharmacy during regular pharmacy hours.  If you are having surgery  late in the day/evening your prescription may be e-prescribed to your pharmacy.  Please verify your pharmacy hours or chose a 24 hour pharmacy to avoid not having access to your prescription because your pharmacy has closed for the day.    If you are staying overnight following surgery, you will be transported to your hospital room following the recovery period.  [facility] has all private rooms.    If you have any questions please call Pre-Admission Testing at [phone].  Deductibles and co-payments are collected on the day of service. Please be prepared to pay the required co-pay, deductible or deposit on the day of service as defined by your plan.    Patient Education for Self-Quarantine Process    • Following your COVID testing, we strongly recommend that you wear a mask when you are with other people and practice social distancing.   • Limit your activities to only required outings.  • Wash your hands with soap and water frequently for at least 20 seconds.   • Avoid touching your eyes, nose and mouth with unwashed hands.  • Do not share anything - utensils, drinking glasses, food from the same bowl.   • Sanitize household surfaces daily. Include all high touch areas (door handles, light switches, phones, countertops, etc.)    Call your surgeon immediately if you experience any of the following symptoms:  • Sore Throat  • Shortness of Breath or difficulty breathing  • Cough  • Chills  • Body soreness or muscle pain  • Headache  • Fever  • New loss of taste or smell  • Do not arrive for your surgery ill.  Your procedure will need to be rescheduled to another time.  You will need to call your physician before the day of surgery to avoid any unnecessary exposure to hospital staff as well as other patients.      CHLORHEXIDINE CLOTH INSTRUCTIONS  The morning of surgery follow these instructions using the Chlorhexidine cloths you've been given.  These steps reduce bacteria on the body.  Do not use  the cloths near your eyes, ears mouth, genitalia or on open wounds.  Throw the cloths away after use but do not try to flush them down a toilet.      • Open and remove one cloth at a time from the package.    • Leave the cloth unfolded and begin the bathing.  • Massage the skin with the cloths using gentle pressure to remove bacteria.  Do not scrub harshly.   • Follow the steps below with one 2% CHG cloth per area (6 total cloths).  • One cloth for neck, shoulders and chest.  • One cloth for both arms, hands, fingers and underarms (do underarms last).  • One cloth for the abdomen followed by groin.  • One cloth for right leg and foot including between the toes.  • One cloth for left leg and foot including between the toes.  • The last cloth is to be used for the back of the neck, back and buttocks.    Allow the CHG to air dry 3 minutes on the skin which will give it time to work and decrease the chance of irritation.  The skin may feel sticky until it is dry.  Do not rinse with water or any other liquid or you will lose the beneficial effects of the CHG.  If mild skin irritation occurs, do rinse the skin to remove the CHG.  Report this to the nurse at time of admission.  Do not apply lotions, creams, ointments, deodorants or perfumes after using the clothes. Dress in clean clothes before coming to the hospital.    BACTROBAN NASAL OINTMENT  There are many germs normally in your nose. Bactroban is an ointment that will help reduce these germs. Please follow these instructions for Bactroban use:      __1__The day before surgery in the morning  Date__7/19_    __2__The day before surgery in the evening              Date__7/19    _3___The day of surgery in the morning    Date__7/20    **Squirt ½ package of Bactroban Ointment onto a cotton applicator and apply to inside of 1st nostril.  Squirt the remaining Bactroban and apply to the inside of the other nostril.

## 2021-07-08 NOTE — PROGRESS NOTES
MT encounter (labs) -- Kisqali     Ref. Range 7/7/2021 12:15   Glucose Latest Ref Range: 74 - 124 mg/dL 94   Sodium Latest Ref Range: 134 - 145 mmol/L 141   Potassium Latest Ref Range: 3.5 - 4.7 mmol/L 4.4   CO2 Latest Ref Range: 22.0 - 29.0 mmol/L 23.8   Chloride Latest Ref Range: 98 - 107 mmol/L 106   Anion Gap Latest Ref Range: 5.0 - 15.0 mmol/L 11.2   Creatinine Latest Ref Range: 0.60 - 1.10 mg/dL 1.10   BUN Latest Ref Range: 6 - 20 mg/dL 27 (H)   BUN/Creatinine Ratio Latest Ref Range: 7.3 - 30.0  24.5   Calcium Latest Ref Range: 8.5 - 10.2 mg/dL 9.5   eGFR Non African Am Latest Ref Range: >60 mL/min/1.73 50 (L)   Alkaline Phosphatase Latest Ref Range: 38 - 116 U/L 99   Total Protein Latest Ref Range: 6.3 - 8.0 g/dL 6.8   ALT (SGPT) Latest Ref Range: 0 - 33 U/L 20   AST (SGOT) Latest Ref Range: 0 - 32 U/L 23   Total Bilirubin Latest Ref Range: 0.2 - 1.2 mg/dL 0.2   Albumin Latest Ref Range: 3.50 - 5.20 g/dL 4.50   Globulin Latest Ref Range: 1.8 - 3.5 gm/dL 2.3   A/G Ratio Latest Ref Range: 1.1 - 2.4 g/dL 2.0   WBC Latest Ref Range: 3.40 - 10.80 10*3/mm3 2.41 (L)   RBC Latest Ref Range: 3.77 - 5.28 10*6/mm3 3.85   Hemoglobin Latest Ref Range: 12.0 - 15.9 g/dL 12.2   Hematocrit Latest Ref Range: 34.0 - 46.6 % 38.0   RDW Latest Ref Range: 12.3 - 15.4 % 14.3   MCV Latest Ref Range: 79.0 - 97.0 fL 98.7 (H)   MCH Latest Ref Range: 26.6 - 33.0 pg 31.7   MCHC Latest Ref Range: 31.5 - 35.7 g/dL 32.1   MPV Latest Ref Range: 6.0 - 12.0 fL 8.5   Platelets Latest Ref Range: 140 - 450 10*3/mm3 190   RDW-SD Latest Ref Range: 37.0 - 54.0 fl 51.8   Neutrophil Rel % Latest Ref Range: 42.7 - 76.0 % 53.6   Lymphocyte Rel % Latest Ref Range: 19.6 - 45.3 % 28.2   Monocyte Rel % Latest Ref Range: 5.0 - 12.0 % 14.5 (H)   Eosinophil Rel % Latest Ref Range: 0.3 - 6.2 % 1.2   Basophil Rel % Latest Ref Range: 0.0 - 1.5 % 2.1 (H)   Immature Granulocyte Rel % Latest Ref Range: 0.0 - 0.5 % 0.4   Neutrophils Absolute Latest Ref Range: 1.70 -  7.00 10*3/mm3 1.29 (L)   Lymphocytes Absolute Latest Ref Range: 0.70 - 3.10 10*3/mm3 0.68 (L)   Monocytes Absolute Latest Ref Range: 0.10 - 0.90 10*3/mm3 0.35   Eosinophils Absolute Latest Ref Range: 0.00 - 0.40 10*3/mm3 0.03   Basophils Absolute Latest Ref Range: 0.00 - 0.20 10*3/mm3 0.05   Immature Grans, Absolute Latest Ref Range: 0.00 - 0.05 10*3/mm3 0.01   nRBC Latest Ref Range: 0.0 - 0.2 /100 WBC 0.0   CA 15-3 Latest Ref Range: <=25.0 U/mL 16.8     MD dictation noted. Patient doing well, hip replacement scheduled for next week. 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 Patient will hold off on taking this until her next appointment in 6 weeks. Pharmacy will continue to monitor.     Thanks,  Kalyani Lamb, Pharmacy Intern

## 2021-07-16 ENCOUNTER — LAB (OUTPATIENT)
Dept: LAB | Facility: HOSPITAL | Age: 61
End: 2021-07-16

## 2021-07-16 DIAGNOSIS — C50.812 MALIGNANT NEOPLASM OF OVERLAPPING SITES OF LEFT BREAST IN FEMALE, ESTROGEN RECEPTOR POSITIVE (HCC): ICD-10-CM

## 2021-07-16 DIAGNOSIS — M16.12 ARTHRITIS OF LEFT HIP: ICD-10-CM

## 2021-07-16 DIAGNOSIS — I10 ESSENTIAL HYPERTENSION: ICD-10-CM

## 2021-07-16 DIAGNOSIS — Z17.0 MALIGNANT NEOPLASM OF OVERLAPPING SITES OF LEFT BREAST IN FEMALE, ESTROGEN RECEPTOR POSITIVE (HCC): ICD-10-CM

## 2021-07-16 LAB
ALBUMIN SERPL-MCNC: 4.3 G/DL (ref 3.5–5.2)
ALBUMIN/GLOB SERPL: 1.7 G/DL (ref 1.1–2.4)
ALP SERPL-CCNC: 110 U/L (ref 38–116)
ALT SERPL W P-5'-P-CCNC: 32 U/L (ref 0–33)
ANION GAP SERPL CALCULATED.3IONS-SCNC: 9.7 MMOL/L (ref 5–15)
AST SERPL-CCNC: 29 U/L (ref 0–32)
BASOPHILS # BLD AUTO: 0.08 10*3/MM3 (ref 0–0.2)
BASOPHILS NFR BLD AUTO: 2.8 % (ref 0–1.5)
BILIRUB SERPL-MCNC: 0.3 MG/DL (ref 0.2–1.2)
BUN SERPL-MCNC: 31 MG/DL (ref 6–20)
BUN/CREAT SERPL: 33.3 (ref 7.3–30)
CALCIUM SPEC-SCNC: 9.5 MG/DL (ref 8.5–10.2)
CANCER AG15-3 SERPL-ACNC: 18.6 U/ML
CHLORIDE SERPL-SCNC: 103 MMOL/L (ref 98–107)
CO2 SERPL-SCNC: 25.3 MMOL/L (ref 22–29)
CREAT SERPL-MCNC: 0.93 MG/DL (ref 0.6–1.1)
DEPRECATED RDW RBC AUTO: 52.4 FL (ref 37–54)
EOSINOPHIL # BLD AUTO: 0.05 10*3/MM3 (ref 0–0.4)
EOSINOPHIL NFR BLD AUTO: 1.8 % (ref 0.3–6.2)
ERYTHROCYTE [DISTWIDTH] IN BLOOD BY AUTOMATED COUNT: 14.4 % (ref 12.3–15.4)
GFR SERPL CREATININE-BSD FRML MDRD: 61 ML/MIN/1.73
GLOBULIN UR ELPH-MCNC: 2.6 GM/DL (ref 1.8–3.5)
GLUCOSE SERPL-MCNC: 97 MG/DL (ref 74–124)
HCT VFR BLD AUTO: 38.6 % (ref 34–46.6)
HGB BLD-MCNC: 12.2 G/DL (ref 12–15.9)
IMM GRANULOCYTES # BLD AUTO: 0.01 10*3/MM3 (ref 0–0.05)
IMM GRANULOCYTES NFR BLD AUTO: 0.4 % (ref 0–0.5)
LYMPHOCYTES # BLD AUTO: 0.6 10*3/MM3 (ref 0.7–3.1)
LYMPHOCYTES NFR BLD AUTO: 21.1 % (ref 19.6–45.3)
MAGNESIUM SERPL-MCNC: 2.2 MG/DL (ref 1.8–2.5)
MCH RBC QN AUTO: 31.1 PG (ref 26.6–33)
MCHC RBC AUTO-ENTMCNC: 31.6 G/DL (ref 31.5–35.7)
MCV RBC AUTO: 98.5 FL (ref 79–97)
MONOCYTES # BLD AUTO: 0.3 10*3/MM3 (ref 0.1–0.9)
MONOCYTES NFR BLD AUTO: 10.6 % (ref 5–12)
NEUTROPHILS NFR BLD AUTO: 1.8 10*3/MM3 (ref 1.7–7)
NEUTROPHILS NFR BLD AUTO: 63.3 % (ref 42.7–76)
NRBC BLD AUTO-RTO: 0 /100 WBC (ref 0–0.2)
PLATELET # BLD AUTO: 192 10*3/MM3 (ref 140–450)
PMV BLD AUTO: 8.3 FL (ref 6–12)
POTASSIUM SERPL-SCNC: 4.6 MMOL/L (ref 3.5–4.7)
PROT SERPL-MCNC: 6.9 G/DL (ref 6.3–8)
RBC # BLD AUTO: 3.92 10*6/MM3 (ref 3.77–5.28)
SODIUM SERPL-SCNC: 138 MMOL/L (ref 134–145)
WBC # BLD AUTO: 2.84 10*3/MM3 (ref 3.4–10.8)

## 2021-07-16 PROCEDURE — 36415 COLL VENOUS BLD VENIPUNCTURE: CPT

## 2021-07-16 PROCEDURE — 83735 ASSAY OF MAGNESIUM: CPT

## 2021-07-16 PROCEDURE — 85025 COMPLETE CBC W/AUTO DIFF WBC: CPT

## 2021-07-16 PROCEDURE — 86300 IMMUNOASSAY TUMOR CA 15-3: CPT | Performed by: INTERNAL MEDICINE

## 2021-07-16 PROCEDURE — 80053 COMPREHEN METABOLIC PANEL: CPT

## 2021-07-17 ENCOUNTER — LAB (OUTPATIENT)
Dept: LAB | Facility: HOSPITAL | Age: 61
End: 2021-07-17

## 2021-07-17 DIAGNOSIS — Z01.818 OTHER SPECIFIED PRE-OPERATIVE EXAMINATION: ICD-10-CM

## 2021-07-17 LAB — SARS-COV-2 ORF1AB RESP QL NAA+PROBE: NOT DETECTED

## 2021-07-17 PROCEDURE — U0004 COV-19 TEST NON-CDC HGH THRU: HCPCS

## 2021-07-17 PROCEDURE — C9803 HOPD COVID-19 SPEC COLLECT: HCPCS

## 2021-07-19 ENCOUNTER — MEDICATION THERAPY MANAGEMENT (OUTPATIENT)
Dept: PHARMACY | Facility: HOSPITAL | Age: 61
End: 2021-07-19

## 2021-07-19 NOTE — PROGRESS NOTES
[facility] lab review ( Kisqali)        7/16/2021   WBC 3.40 - 10.80 10*3/mm3 2.84 (A)   Neutrophils Absolute 1.70 - 7.00 10*3/mm3 1.80   Hemoglobin 12.0 - 15.9 g/dL 12.2   Hematocrit 34.0 - 46.6 % 38.6   Platelets 140 - 450 10*3/mm3 192   Creatinine 0.60 - 1.10 mg/dL 0.93   eGFR Non African Am >60 mL/min/1.73 61   BUN 6 - 20 mg/dL 31 (A)   Sodium 134 - 145 mmol/L 138   Potassium 3.5 - 4.7 mmol/L 4.6   Glucose 74 - 124 mg/dL 97   Magnesium 1.8 - 2.5 mg/dL 2.2   Calcium 8.5 - 10.2 mg/dL 9.5   Albumin 3.50 - 5.20 g/dL 4.30   Total Protein 6.3 - 8.0 g/dL 6.9   AST (SGOT) 0 - 32 U/L 29   ALT (SGPT) 0 - 33 U/L 32   Alkaline Phosphatase 38 - 116 U/L 110   Total Bilirubin 0.2 - 1.2 mg/dL 0.3   CA 15-3 <=25.0 U/mL 18.6     Continues Kisqali 600 mg po daily 21/28 days

## 2021-07-20 ENCOUNTER — ANESTHESIA (OUTPATIENT)
Dept: PERIOP | Facility: HOSPITAL | Age: 61
End: 2021-07-20

## 2021-07-20 ENCOUNTER — ANESTHESIA EVENT (OUTPATIENT)
Dept: PERIOP | Facility: HOSPITAL | Age: 61
End: 2021-07-20

## 2021-07-20 ENCOUNTER — HOSPITAL ENCOUNTER (OUTPATIENT)
Facility: HOSPITAL | Age: 61
Discharge: HOME | End: 2021-07-21
Attending: ORTHOPAEDIC SURGERY | Admitting: ORTHOPAEDIC SURGERY

## 2021-07-20 ENCOUNTER — APPOINTMENT (OUTPATIENT)
Dept: GENERAL RADIOLOGY | Facility: HOSPITAL | Age: 61
End: 2021-07-20

## 2021-07-20 DIAGNOSIS — M16.12 ARTHRITIS OF LEFT HIP: ICD-10-CM

## 2021-07-20 PROCEDURE — 63710000001 PROMETHAZINE PER 25 MG: Performed by: NURSE ANESTHETIST, CERTIFIED REGISTERED

## 2021-07-20 PROCEDURE — 25010000002 FENTANYL CITRATE (PF) 50 MCG/ML SOLUTION: Performed by: NURSE ANESTHETIST, CERTIFIED REGISTERED

## 2021-07-20 PROCEDURE — 25010000003 CEFAZOLIN IN DEXTROSE 2-4 GM/100ML-% SOLUTION: Performed by: NURSE PRACTITIONER

## 2021-07-20 PROCEDURE — 25010000002 NEOSTIGMINE 5 MG/10ML SOLUTION: Performed by: NURSE ANESTHETIST, CERTIFIED REGISTERED

## 2021-07-20 PROCEDURE — 27130 TOTAL HIP ARTHROPLASTY: CPT | Performed by: ORTHOPAEDIC SURGERY

## 2021-07-20 PROCEDURE — 25010000002 ROPIVACAINE PER 1 MG: Performed by: ORTHOPAEDIC SURGERY

## 2021-07-20 PROCEDURE — 25010000002 HYDROMORPHONE PER 4 MG: Performed by: NURSE ANESTHETIST, CERTIFIED REGISTERED

## 2021-07-20 PROCEDURE — G0378 HOSPITAL OBSERVATION PER HR: HCPCS

## 2021-07-20 PROCEDURE — 63710000001 ONDANSETRON PER 8 MG: Performed by: NURSE PRACTITIONER

## 2021-07-20 PROCEDURE — 25010000002 KETOROLAC TROMETHAMINE PER 15 MG: Performed by: ORTHOPAEDIC SURGERY

## 2021-07-20 PROCEDURE — C1889 IMPLANT/INSERT DEVICE, NOC: HCPCS | Performed by: ORTHOPAEDIC SURGERY

## 2021-07-20 PROCEDURE — 25010000002 ONDANSETRON PER 1 MG: Performed by: NURSE ANESTHETIST, CERTIFIED REGISTERED

## 2021-07-20 PROCEDURE — 25010000003 CEFAZOLIN IN DEXTROSE 2-4 GM/100ML-% SOLUTION: Performed by: ORTHOPAEDIC SURGERY

## 2021-07-20 PROCEDURE — 25010000002 PROPOFOL 10 MG/ML EMULSION: Performed by: NURSE ANESTHETIST, CERTIFIED REGISTERED

## 2021-07-20 PROCEDURE — 97116 GAIT TRAINING THERAPY: CPT

## 2021-07-20 PROCEDURE — 25010000002 CLONIDINE PER 1 MG: Performed by: ORTHOPAEDIC SURGERY

## 2021-07-20 PROCEDURE — 25010000002 EPINEPHRINE 30 MG/30ML SOLUTION: Performed by: ORTHOPAEDIC SURGERY

## 2021-07-20 PROCEDURE — 73501 X-RAY EXAM HIP UNI 1 VIEW: CPT

## 2021-07-20 PROCEDURE — 97161 PT EVAL LOW COMPLEX 20 MIN: CPT

## 2021-07-20 PROCEDURE — C1776 JOINT DEVICE (IMPLANTABLE): HCPCS | Performed by: ORTHOPAEDIC SURGERY

## 2021-07-20 PROCEDURE — 97110 THERAPEUTIC EXERCISES: CPT

## 2021-07-20 PROCEDURE — 25010000002 DEXAMETHASONE PER 1 MG: Performed by: NURSE ANESTHETIST, CERTIFIED REGISTERED

## 2021-07-20 DEVICE — DEV CONTRL TISS STRATAFIXSPIRALMNCRYL PLSPS2 REV3/0 45CM: Type: IMPLANTABLE DEVICE | Site: HIP | Status: FUNCTIONAL

## 2021-07-20 DEVICE — PINNACLE GRIPTION ACETABULAR SHELL SECTOR 50MM OD
Type: IMPLANTABLE DEVICE | Site: HIP | Status: FUNCTIONAL
Brand: PINNACLE GRIPTION

## 2021-07-20 DEVICE — SUMMIT FEMORAL STEM 12/14 TAPER TAPER ED W/POROCOAT SIZE 6 STD 150MM
Type: IMPLANTABLE DEVICE | Site: HIP | Status: FUNCTIONAL
Brand: SUMMIT POROCOAT

## 2021-07-20 DEVICE — TOTL HIP COP DEPUY 9641334: Type: IMPLANTABLE DEVICE | Site: HIP | Status: FUNCTIONAL

## 2021-07-20 DEVICE — BIOLOX DELTA CERAMIC FEMORAL HEAD 32MM DIA +1 12/14 TAPER
Type: IMPLANTABLE DEVICE | Site: HIP | Status: FUNCTIONAL
Brand: BIOLOX DELTA

## 2021-07-20 DEVICE — SEAL HEMO SURG ARISTA/AH ABS/PWDR 3GM: Type: IMPLANTABLE DEVICE | Site: HIP | Status: FUNCTIONAL

## 2021-07-20 DEVICE — PINNACLE HIP SOLUTIONS ALTRX POLYETHYLENE ACETABULAR LINER +4 10 DEGREES 32MM ID 50MM OD
Type: IMPLANTABLE DEVICE | Site: HIP | Status: FUNCTIONAL
Brand: PINNACLE ALTRX

## 2021-07-20 RX ORDER — EPHEDRINE SULFATE 50 MG/ML
5 INJECTION, SOLUTION INTRAVENOUS ONCE AS NEEDED
Status: DISCONTINUED | OUTPATIENT
Start: 2021-07-20 | End: 2021-07-20 | Stop reason: HOSPADM

## 2021-07-20 RX ORDER — SODIUM CHLORIDE 9 MG/ML
INJECTION, SOLUTION INTRAVENOUS AS NEEDED
Status: DISCONTINUED | OUTPATIENT
Start: 2021-07-20 | End: 2021-07-20 | Stop reason: HOSPADM

## 2021-07-20 RX ORDER — ONDANSETRON 2 MG/ML
4 INJECTION INTRAMUSCULAR; INTRAVENOUS ONCE AS NEEDED
Status: DISCONTINUED | OUTPATIENT
Start: 2021-07-20 | End: 2021-07-20 | Stop reason: HOSPADM

## 2021-07-20 RX ORDER — CEFAZOLIN SODIUM 2 G/100ML
2 INJECTION, SOLUTION INTRAVENOUS EVERY 8 HOURS
Status: COMPLETED | OUTPATIENT
Start: 2021-07-20 | End: 2021-07-20

## 2021-07-20 RX ORDER — HYDROMORPHONE HYDROCHLORIDE 1 MG/ML
0.5 INJECTION, SOLUTION INTRAMUSCULAR; INTRAVENOUS; SUBCUTANEOUS
Status: DISCONTINUED | OUTPATIENT
Start: 2021-07-20 | End: 2021-07-21 | Stop reason: HOSPADM

## 2021-07-20 RX ORDER — ONDANSETRON 2 MG/ML
INJECTION INTRAMUSCULAR; INTRAVENOUS AS NEEDED
Status: DISCONTINUED | OUTPATIENT
Start: 2021-07-20 | End: 2021-07-20 | Stop reason: SURG

## 2021-07-20 RX ORDER — MIDAZOLAM HYDROCHLORIDE 1 MG/ML
1 INJECTION INTRAMUSCULAR; INTRAVENOUS
Status: DISCONTINUED | OUTPATIENT
Start: 2021-07-20 | End: 2021-07-20 | Stop reason: HOSPADM

## 2021-07-20 RX ORDER — LIDOCAINE HYDROCHLORIDE 10 MG/ML
0.5 INJECTION, SOLUTION EPIDURAL; INFILTRATION; INTRACAUDAL; PERINEURAL ONCE AS NEEDED
Status: DISCONTINUED | OUTPATIENT
Start: 2021-07-20 | End: 2021-07-20 | Stop reason: HOSPADM

## 2021-07-20 RX ORDER — FAMOTIDINE 10 MG/ML
20 INJECTION, SOLUTION INTRAVENOUS ONCE
Status: COMPLETED | OUTPATIENT
Start: 2021-07-20 | End: 2021-07-20

## 2021-07-20 RX ORDER — LETROZOLE 2.5 MG/1
2.5 TABLET, FILM COATED ORAL DAILY
Status: DISCONTINUED | OUTPATIENT
Start: 2021-07-21 | End: 2021-07-21 | Stop reason: HOSPADM

## 2021-07-20 RX ORDER — HYDROMORPHONE HYDROCHLORIDE 1 MG/ML
0.5 INJECTION, SOLUTION INTRAMUSCULAR; INTRAVENOUS; SUBCUTANEOUS
Status: DISCONTINUED | OUTPATIENT
Start: 2021-07-20 | End: 2021-07-20 | Stop reason: HOSPADM

## 2021-07-20 RX ORDER — CEFAZOLIN SODIUM 2 G/100ML
2 INJECTION, SOLUTION INTRAVENOUS ONCE
Status: COMPLETED | OUTPATIENT
Start: 2021-07-20 | End: 2021-07-20

## 2021-07-20 RX ORDER — MAGNESIUM HYDROXIDE 1200 MG/15ML
LIQUID ORAL AS NEEDED
Status: DISCONTINUED | OUTPATIENT
Start: 2021-07-20 | End: 2021-07-20 | Stop reason: HOSPADM

## 2021-07-20 RX ORDER — METOPROLOL SUCCINATE 25 MG/1
25 TABLET, EXTENDED RELEASE ORAL DAILY
Status: DISCONTINUED | OUTPATIENT
Start: 2021-07-21 | End: 2021-07-21 | Stop reason: HOSPADM

## 2021-07-20 RX ORDER — SODIUM CHLORIDE, SODIUM LACTATE, POTASSIUM CHLORIDE, CALCIUM CHLORIDE 600; 310; 30; 20 MG/100ML; MG/100ML; MG/100ML; MG/100ML
9 INJECTION, SOLUTION INTRAVENOUS CONTINUOUS
Status: DISCONTINUED | OUTPATIENT
Start: 2021-07-20 | End: 2021-07-20

## 2021-07-20 RX ORDER — FENTANYL CITRATE 50 UG/ML
50 INJECTION, SOLUTION INTRAMUSCULAR; INTRAVENOUS
Status: DISCONTINUED | OUTPATIENT
Start: 2021-07-20 | End: 2021-07-20 | Stop reason: HOSPADM

## 2021-07-20 RX ORDER — LABETALOL HYDROCHLORIDE 5 MG/ML
5 INJECTION, SOLUTION INTRAVENOUS
Status: DISCONTINUED | OUTPATIENT
Start: 2021-07-20 | End: 2021-07-20 | Stop reason: HOSPADM

## 2021-07-20 RX ORDER — MECLIZINE HYDROCHLORIDE 25 MG/1
25 TABLET ORAL 3 TIMES DAILY PRN
Status: DISCONTINUED | OUTPATIENT
Start: 2021-07-20 | End: 2021-07-21 | Stop reason: HOSPADM

## 2021-07-20 RX ORDER — NEOSTIGMINE METHYLSULFATE 0.5 MG/ML
INJECTION, SOLUTION INTRAVENOUS AS NEEDED
Status: DISCONTINUED | OUTPATIENT
Start: 2021-07-20 | End: 2021-07-20 | Stop reason: SURG

## 2021-07-20 RX ORDER — POLYETHYLENE GLYCOL 3350 17 G/17G
17 POWDER, FOR SOLUTION ORAL DAILY
Status: DISCONTINUED | OUTPATIENT
Start: 2021-07-20 | End: 2021-07-21 | Stop reason: HOSPADM

## 2021-07-20 RX ORDER — MELOXICAM 15 MG/1
15 TABLET ORAL DAILY
Status: DISCONTINUED | OUTPATIENT
Start: 2021-07-21 | End: 2021-07-21 | Stop reason: HOSPADM

## 2021-07-20 RX ORDER — ROCURONIUM BROMIDE 10 MG/ML
INJECTION, SOLUTION INTRAVENOUS AS NEEDED
Status: DISCONTINUED | OUTPATIENT
Start: 2021-07-20 | End: 2021-07-20 | Stop reason: SURG

## 2021-07-20 RX ORDER — SODIUM CHLORIDE 0.9 % (FLUSH) 0.9 %
3 SYRINGE (ML) INJECTION EVERY 12 HOURS SCHEDULED
Status: DISCONTINUED | OUTPATIENT
Start: 2021-07-20 | End: 2021-07-20 | Stop reason: HOSPADM

## 2021-07-20 RX ORDER — HYDROMORPHONE HCL 110MG/55ML
PATIENT CONTROLLED ANALGESIA SYRINGE INTRAVENOUS AS NEEDED
Status: DISCONTINUED | OUTPATIENT
Start: 2021-07-20 | End: 2021-07-20 | Stop reason: SURG

## 2021-07-20 RX ORDER — FLUMAZENIL 0.1 MG/ML
0.2 INJECTION INTRAVENOUS AS NEEDED
Status: DISCONTINUED | OUTPATIENT
Start: 2021-07-20 | End: 2021-07-20 | Stop reason: HOSPADM

## 2021-07-20 RX ORDER — HYDROCODONE BITARTRATE AND ACETAMINOPHEN 5; 325 MG/1; MG/1
1 TABLET ORAL ONCE AS NEEDED
Status: DISCONTINUED | OUTPATIENT
Start: 2021-07-20 | End: 2021-07-20 | Stop reason: HOSPADM

## 2021-07-20 RX ORDER — TRANEXAMIC ACID 100 MG/ML
INJECTION, SOLUTION INTRAVENOUS AS NEEDED
Status: DISCONTINUED | OUTPATIENT
Start: 2021-07-20 | End: 2021-07-20 | Stop reason: SURG

## 2021-07-20 RX ORDER — ACETAMINOPHEN 325 MG/1
325 TABLET ORAL EVERY 4 HOURS PRN
Status: DISCONTINUED | OUTPATIENT
Start: 2021-07-20 | End: 2021-07-21 | Stop reason: HOSPADM

## 2021-07-20 RX ORDER — FENTANYL CITRATE 50 UG/ML
INJECTION, SOLUTION INTRAMUSCULAR; INTRAVENOUS AS NEEDED
Status: DISCONTINUED | OUTPATIENT
Start: 2021-07-20 | End: 2021-07-20 | Stop reason: SURG

## 2021-07-20 RX ORDER — ONDANSETRON 4 MG/1
4 TABLET, FILM COATED ORAL EVERY 6 HOURS PRN
Status: DISCONTINUED | OUTPATIENT
Start: 2021-07-20 | End: 2021-07-21 | Stop reason: HOSPADM

## 2021-07-20 RX ORDER — MELOXICAM 15 MG/1
15 TABLET ORAL ONCE
Status: COMPLETED | OUTPATIENT
Start: 2021-07-20 | End: 2021-07-20

## 2021-07-20 RX ORDER — FAMOTIDINE 20 MG/1
40 TABLET, FILM COATED ORAL DAILY
Status: DISCONTINUED | OUTPATIENT
Start: 2021-07-20 | End: 2021-07-21 | Stop reason: HOSPADM

## 2021-07-20 RX ORDER — ONDANSETRON 2 MG/ML
4 INJECTION INTRAMUSCULAR; INTRAVENOUS EVERY 6 HOURS PRN
Status: DISCONTINUED | OUTPATIENT
Start: 2021-07-20 | End: 2021-07-21 | Stop reason: HOSPADM

## 2021-07-20 RX ORDER — PROMETHAZINE HYDROCHLORIDE 25 MG/1
25 TABLET ORAL ONCE AS NEEDED
Status: COMPLETED | OUTPATIENT
Start: 2021-07-20 | End: 2021-07-20

## 2021-07-20 RX ORDER — SODIUM CHLORIDE 0.9 % (FLUSH) 0.9 %
3-10 SYRINGE (ML) INJECTION AS NEEDED
Status: DISCONTINUED | OUTPATIENT
Start: 2021-07-20 | End: 2021-07-20 | Stop reason: HOSPADM

## 2021-07-20 RX ORDER — ACETAMINOPHEN 500 MG
1000 TABLET ORAL ONCE
Status: COMPLETED | OUTPATIENT
Start: 2021-07-20 | End: 2021-07-20

## 2021-07-20 RX ORDER — NALOXONE HCL 0.4 MG/ML
0.1 VIAL (ML) INJECTION
Status: DISCONTINUED | OUTPATIENT
Start: 2021-07-20 | End: 2021-07-21 | Stop reason: HOSPADM

## 2021-07-20 RX ORDER — DEXAMETHASONE SODIUM PHOSPHATE 10 MG/ML
INJECTION INTRAMUSCULAR; INTRAVENOUS AS NEEDED
Status: DISCONTINUED | OUTPATIENT
Start: 2021-07-20 | End: 2021-07-20 | Stop reason: SURG

## 2021-07-20 RX ORDER — DOCUSATE SODIUM 100 MG/1
100 CAPSULE, LIQUID FILLED ORAL 2 TIMES DAILY
Status: DISCONTINUED | OUTPATIENT
Start: 2021-07-20 | End: 2021-07-21 | Stop reason: HOSPADM

## 2021-07-20 RX ORDER — LIDOCAINE HYDROCHLORIDE 20 MG/ML
INJECTION, SOLUTION INFILTRATION; PERINEURAL AS NEEDED
Status: DISCONTINUED | OUTPATIENT
Start: 2021-07-20 | End: 2021-07-20 | Stop reason: SURG

## 2021-07-20 RX ORDER — NALOXONE HCL 0.4 MG/ML
0.2 VIAL (ML) INJECTION AS NEEDED
Status: DISCONTINUED | OUTPATIENT
Start: 2021-07-20 | End: 2021-07-20 | Stop reason: HOSPADM

## 2021-07-20 RX ORDER — HYDROCODONE BITARTRATE AND ACETAMINOPHEN 7.5; 325 MG/1; MG/1
2 TABLET ORAL EVERY 4 HOURS PRN
Status: DISCONTINUED | OUTPATIENT
Start: 2021-07-20 | End: 2021-07-21 | Stop reason: HOSPADM

## 2021-07-20 RX ORDER — PREGABALIN 75 MG/1
150 CAPSULE ORAL ONCE
Status: COMPLETED | OUTPATIENT
Start: 2021-07-20 | End: 2021-07-20

## 2021-07-20 RX ORDER — PROPOFOL 10 MG/ML
VIAL (ML) INTRAVENOUS AS NEEDED
Status: DISCONTINUED | OUTPATIENT
Start: 2021-07-20 | End: 2021-07-20 | Stop reason: SURG

## 2021-07-20 RX ORDER — PROMETHAZINE HYDROCHLORIDE 25 MG/1
25 SUPPOSITORY RECTAL ONCE AS NEEDED
Status: COMPLETED | OUTPATIENT
Start: 2021-07-20 | End: 2021-07-20

## 2021-07-20 RX ORDER — GLYCOPYRROLATE 0.2 MG/ML
INJECTION INTRAMUSCULAR; INTRAVENOUS AS NEEDED
Status: DISCONTINUED | OUTPATIENT
Start: 2021-07-20 | End: 2021-07-20 | Stop reason: SURG

## 2021-07-20 RX ORDER — OXYCODONE AND ACETAMINOPHEN 7.5; 325 MG/1; MG/1
1 TABLET ORAL EVERY 4 HOURS PRN
Status: DISCONTINUED | OUTPATIENT
Start: 2021-07-20 | End: 2021-07-20 | Stop reason: HOSPADM

## 2021-07-20 RX ORDER — BISACODYL 5 MG/1
10 TABLET, DELAYED RELEASE ORAL DAILY PRN
Status: DISCONTINUED | OUTPATIENT
Start: 2021-07-20 | End: 2021-07-21 | Stop reason: HOSPADM

## 2021-07-20 RX ORDER — HYDROCODONE BITARTRATE AND ACETAMINOPHEN 7.5; 325 MG/1; MG/1
1 TABLET ORAL EVERY 4 HOURS PRN
Status: DISCONTINUED | OUTPATIENT
Start: 2021-07-20 | End: 2021-07-21 | Stop reason: HOSPADM

## 2021-07-20 RX ORDER — SODIUM CHLORIDE, SODIUM LACTATE, POTASSIUM CHLORIDE, CALCIUM CHLORIDE 600; 310; 30; 20 MG/100ML; MG/100ML; MG/100ML; MG/100ML
100 INJECTION, SOLUTION INTRAVENOUS CONTINUOUS
Status: DISCONTINUED | OUTPATIENT
Start: 2021-07-20 | End: 2021-07-21 | Stop reason: HOSPADM

## 2021-07-20 RX ADMIN — HYDROMORPHONE HYDROCHLORIDE 0.5 MG: 1 INJECTION, SOLUTION INTRAMUSCULAR; INTRAVENOUS; SUBCUTANEOUS at 11:30

## 2021-07-20 RX ADMIN — FENTANYL CITRATE 50 MCG: 50 INJECTION, SOLUTION INTRAMUSCULAR; INTRAVENOUS at 11:09

## 2021-07-20 RX ADMIN — FAMOTIDINE 40 MG: 20 TABLET, FILM COATED ORAL at 15:51

## 2021-07-20 RX ADMIN — PROMETHAZINE HYDROCHLORIDE 12.5 MG: 25 TABLET ORAL at 11:25

## 2021-07-20 RX ADMIN — PROPOFOL 150 MG: 10 INJECTION, EMULSION INTRAVENOUS at 08:39

## 2021-07-20 RX ADMIN — ROCURONIUM BROMIDE 10 MG: 50 INJECTION INTRAVENOUS at 09:20

## 2021-07-20 RX ADMIN — MUPIROCIN 1 APPLICATION: 20 OINTMENT TOPICAL at 22:41

## 2021-07-20 RX ADMIN — DOCUSATE SODIUM 100 MG: 100 CAPSULE ORAL at 15:51

## 2021-07-20 RX ADMIN — CEFAZOLIN SODIUM 2 G: 2 INJECTION, SOLUTION INTRAVENOUS at 18:56

## 2021-07-20 RX ADMIN — FENTANYL CITRATE 50 MCG: 50 INJECTION INTRAMUSCULAR; INTRAVENOUS at 08:49

## 2021-07-20 RX ADMIN — ACETAMINOPHEN 1000 MG: 500 TABLET, FILM COATED ORAL at 07:16

## 2021-07-20 RX ADMIN — MUPIROCIN 1 APPLICATION: 20 OINTMENT TOPICAL at 15:51

## 2021-07-20 RX ADMIN — GLYCOPYRROLATE 0.4 MG: 0.2 INJECTION INTRAMUSCULAR; INTRAVENOUS at 10:17

## 2021-07-20 RX ADMIN — LIDOCAINE HYDROCHLORIDE 60 MG: 20 INJECTION, SOLUTION INFILTRATION; PERINEURAL at 08:39

## 2021-07-20 RX ADMIN — ROCURONIUM BROMIDE 40 MG: 50 INJECTION INTRAVENOUS at 08:39

## 2021-07-20 RX ADMIN — POLYETHYLENE GLYCOL 3350 17 G: 17 POWDER, FOR SOLUTION ORAL at 16:01

## 2021-07-20 RX ADMIN — DOCUSATE SODIUM 100 MG: 100 CAPSULE ORAL at 22:41

## 2021-07-20 RX ADMIN — HYDROCODONE BITARTRATE AND ACETAMINOPHEN 2 TABLET: 7.5; 325 TABLET ORAL at 11:24

## 2021-07-20 RX ADMIN — PREGABALIN 150 MG: 75 CAPSULE ORAL at 07:16

## 2021-07-20 RX ADMIN — SODIUM CHLORIDE, POTASSIUM CHLORIDE, SODIUM LACTATE AND CALCIUM CHLORIDE 100 ML/HR: 600; 310; 30; 20 INJECTION, SOLUTION INTRAVENOUS at 15:54

## 2021-07-20 RX ADMIN — TRANEXAMIC ACID 1000 MG: 1 INJECTION, SOLUTION INTRAVENOUS at 08:51

## 2021-07-20 RX ADMIN — ONDANSETRON 4 MG: 2 INJECTION INTRAMUSCULAR; INTRAVENOUS at 10:16

## 2021-07-20 RX ADMIN — CEFAZOLIN SODIUM 2 G: 2 INJECTION, SOLUTION INTRAVENOUS at 08:25

## 2021-07-20 RX ADMIN — SODIUM CHLORIDE, POTASSIUM CHLORIDE, SODIUM LACTATE AND CALCIUM CHLORIDE: 600; 310; 30; 20 INJECTION, SOLUTION INTRAVENOUS at 09:42

## 2021-07-20 RX ADMIN — ONDANSETRON HYDROCHLORIDE 4 MG: 4 TABLET, FILM COATED ORAL at 15:51

## 2021-07-20 RX ADMIN — HYDROMORPHONE HYDROCHLORIDE 0.25 MG: 2 INJECTION, SOLUTION INTRAMUSCULAR; INTRAVENOUS; SUBCUTANEOUS at 09:42

## 2021-07-20 RX ADMIN — SODIUM CHLORIDE, POTASSIUM CHLORIDE, SODIUM LACTATE AND CALCIUM CHLORIDE 9 ML/HR: 600; 310; 30; 20 INJECTION, SOLUTION INTRAVENOUS at 07:37

## 2021-07-20 RX ADMIN — FAMOTIDINE 20 MG: 10 INJECTION INTRAVENOUS at 07:37

## 2021-07-20 RX ADMIN — DEXAMETHASONE SODIUM PHOSPHATE 8 MG: 10 INJECTION INTRAMUSCULAR; INTRAVENOUS at 09:02

## 2021-07-20 RX ADMIN — MELOXICAM 15 MG: 15 TABLET ORAL at 07:16

## 2021-07-20 RX ADMIN — HYDROMORPHONE HYDROCHLORIDE 0.25 MG: 2 INJECTION, SOLUTION INTRAMUSCULAR; INTRAVENOUS; SUBCUTANEOUS at 09:27

## 2021-07-20 RX ADMIN — FENTANYL CITRATE 50 MCG: 50 INJECTION INTRAMUSCULAR; INTRAVENOUS at 09:19

## 2021-07-20 RX ADMIN — NEOSTIGMINE METHYLSULFATE 3 MG: 0.5 INJECTION INTRAVENOUS at 10:17

## 2021-07-20 RX ADMIN — SODIUM CHLORIDE, POTASSIUM CHLORIDE, SODIUM LACTATE AND CALCIUM CHLORIDE 500 ML: 600; 310; 30; 20 INJECTION, SOLUTION INTRAVENOUS at 07:00

## 2021-07-20 RX ADMIN — FENTANYL CITRATE 50 MCG: 50 INJECTION, SOLUTION INTRAMUSCULAR; INTRAVENOUS at 11:15

## 2021-07-20 RX ADMIN — CEFAZOLIN SODIUM 2 G: 2 INJECTION, SOLUTION INTRAVENOUS at 11:09

## 2021-07-20 NOTE — DISCHARGE PLACEMENT REQUEST
"Rhonda Davis (61 y.o. Female)     Date of Birth Social Security Number Address Home Phone MRN    1960  [id number] [address] [phone] [id number]    Sikhism Marital Status          Rastafari        Admission Date Admission Type Admitting Provider Attending Provider Department, Room/Bed    7/20/21 Elective Luis M Leonard MD Makk, Stephen P, MD [address], P794/1    Discharge Date Discharge Disposition Discharge Destination                       Attending Provider: Luis M Leonard MD    Allergies: Benadryl [Diphenhydramine], Erythromycin, Levaquin [Levofloxacin], Penicillins    Isolation: None   Infection: None   Code Status: CPR    Ht: 165.1 cm (65\")   Wt: 73.2 kg (161 lb 6.4 oz)    Admission Cmt: None   Principal Problem: Arthritis of left hip [M16.12]                 Active Insurance as of 7/20/2021     Primary Coverage     Payor Plan Insurance Group Employer/Plan Group    [employer]UE [employer] PPO [id number]     Payor Plan Address Payor Plan Phone Number Payor Plan Fax Number Effective Dates    [address] [phone]  7/15/2009 - None Entered    [address] [address]       Subscriber Name Subscriber Birth Date Member ID       RHONDA DAVIS 1960 [id number]                 Emergency Contacts      (Rel.) Home Phone Work Phone Mobile Phone    FAMILIA DAVIS (Brother) -- -- [phone]    Cruz Landry (Spouse) [phone] -- [phone]              "

## 2021-07-20 NOTE — OP NOTE
Operative Note  Name: Marylu Georges  YOB: 1960  MRN: [id number]  BMI: Body mass index is 26.86 kg/m².    DATE OF SURGERY: 7/20/2021    PREOPERATIVE DIAGNOSIS:  left hip end-stage osteoarthritis    POSTOPERATIVE DIAGNOSIS: Same.    PROCEDURE PERFORMED:  left Total hip replacement with Michael navigation    SURGEON:  Luis M Leonard M.D.    ASSISTANT:  LUPE Terry    IMPLANTS:  Depuy [place] Pressfit    Anesthesia: Gen. and local  IV fluid: 1500 crystalloid  Blood loss: 400 cc  Specimen: None  Drain: None  Complications: None   22 Modifier:  none    Description:    Marylu Georges is a 61 y.o.-year-old female with end-stage osteoarthritis of the hip, who presents today for surgery having failed nonoperative therapy.  This patient was brought into the operating room and placed in the supine position on the operative table. Surgical time out was performed.  Gen. anesthetic was induced, the patient was given IV antibiotics, and was placed in a lateral decubitus position with padding and an axillary roll. The operative lower extremity was prepped with Betadine scrub and paint, and draped with sterile drapes including an Ioban. 2 stab wounds were made above the iliac crest anterior superior iliac spine. Two 5 mm half pins were placed in the iliac crest and navigation protocol was undertaken.    A 3-4 inch incision was based in the skin posteriorly. Dissection was carried down to the fascia which was opened. Retractors were placed. The sciatic nerve was identified and avoided. The short external rotators were identified and the capsulotomy was performed. The hip was dislocated and femoral neck cut was made. It was made in line with the template of just less than a fingerbreadth. Acetabular soft tissues were removed and retractors were placed. Acetabular navigation was achieved. Was then reamed sequentially up to fitted size. A trial was placed. It was reamed in approximately 40° of abduction and  20° of anteversion. Bone grafts were applied and appropriate size cup was selected and impacted to affix the cup. The final position was 40° of abduction and 22° of anteversion.    Femoral canal was opened with a . The femur was sequentially broached up to appropriate size using the [employer] Pressfit system and good fit and fill.  Offset was selected using the preoperative template for starting point and tried to correct fit. Leg length was restored or slightly lengthened according the navigation which was in line with the preoperative template. Trial showed equivalent leg lengths, no undue tightness, no undue shuck, and good approximation of soft tissues. The hip was stable anteriorly and 90° of flexion you could internally rotated to 70-75° before it started to come out.    Bony surfaces were irrigated and the Depuy [place] Pressfit was impacted with gentle blows of the mallet. Once again trialed and found to do best with the chosen liner. It reproduced stability and leg length and offset of the trials. The ceramic femoral head was then applied and taken through final range of motion checked all of which were excellent. The wound was dry at the time of surgical conclusion. Tranexamic acid was given and orthopedic mix were infiltrated. The final irrigation was achieved with all 3 L of irrigation fluid. The short external rotators were repaired in an interrupted fashion with a good closure of the capsule and short external rotators. The fascia was closed in an interrupted fashion in a watertight manner. Multilayered closure was placed in the subcutaneous fat which measured 3-4 inches. This required additional time and skill the skin was closed with a v lock suture. Surgical glue was applied and sterile dressings were applied as well. Findings needle and instrument counts reported to be correct. No complications noted but x-rays ordered for the recovery area.     Surgical assistant performed retraction,  suction, hemostasis, and specific limb positioning and manipulation required for accuracy of joint placement, and assistance in wound closure and dressing application.     Dr. Luis M Leonard dictating an operative note.    7/20/2021

## 2021-07-20 NOTE — THERAPY EVALUATION
Patient Name: Marylu Georges  : 1960    MRN: [id number]                              Today's Date: 2021       Admit Date: 2021    Visit Dx:     ICD-10-CM ICD-9-CM   1. Arthritis of left hip  M16.12 716.95     Patient Active Problem List   Diagnosis   • Malignant neoplasm of overlapping sites of left breast in female, estrogen receptor positive (CMS/HCC)   • Family history of breast cancer in female   • Syncope   • Malignant neoplasm of overlapping sites of both breasts in female, estrogen receptor positive (CMS/HCC)   • Hypertension   • Arthritis of left hip   • Encounter for long-term (current) use of other medications     Past Medical History:   Diagnosis Date   • Anemia in neoplastic disease    • Arthritis    • Breast cancer (CMS/HCC)     Left   • CVA (cerebral vascular accident) (CMS/HCC)    • Hip pain     RIGHT HIP... CYST   • History of fracture of leg    • History of radiation therapy     LAST TREATMENT     • Hypertension    • Limited joint range of motion (ROM)     RIGHT HIP   • Skin sore     OPEN SORE LEFT BREAST   • Syncope    • Vertigo      Past Surgical History:   Procedure Laterality Date   • AXILLARY LYMPH NODE BIOPSY/EXCISION Right     LYMPH NODE UNDER RIGHT ARM-MALIGNANT (DOUBLE MASTECTOMY)   • BREAST BIOPSY Left     MALIGNANT   • FRACTURE SURGERY      Leg   • HARDWARE REMOVAL     • MASTECTOMY W/ SENTINEL NODE BIOPSY Bilateral 2019    Procedure: BILATERLA MODIFIED RADICAL MASTECTOMY WITH BILATERAL SENTINEL LYMPH NODE BIOPSY;  Surgeon: Joby Barron Jr., MD;  Location: [facility];  Service: General   • TOTAL HIP ARTHROPLASTY Right 3/2/2020    Procedure: RIGHT TOTAL HIP ARTHROPLASTY NATALY NAVIGATION;  Surgeon: Luis M Leonard MD;  Location: [facility] OR;  Service: Orthopedics;  Laterality: Right;   • VENOUS ACCESS DEVICE (PORT) INSERTION Right 2019    Procedure: INSERTION VENOUS ACCESS DEVICE;  Surgeon: Joby Barron Jr., MD;  Location: [facility]  OR [facility];  Service: General   • VENOUS ACCESS DEVICE (PORT) REMOVAL N/A 10/30/2019    Procedure: Mediport Removal;  Surgeon: Joby Barron Jr., MD;  Location: [facility] OR;  Service: General     General Information     Row Name 07/20/21 [facility]4          Physical Therapy Time and Intention    Document Type  evaluation  -AE     Mode of Treatment  individual therapy;physical therapy  -AE     Row Name 07/20/21 1354          General Information    Patient Profile Reviewed  yes  -AE     Prior Level of Function  independent:  -AE     Existing Precautions/Restrictions  fall;left;hip, posterior  -AE     Row Name 07/20/21 1355          Living Environment    Lives With  spouse  -AE     Row Name 07/20/21 1355          Home Main Entrance    Number of Stairs, Main Entrance  two  -AE     Stair Railings, Main Entrance  railings safe and in good condition;railing on right side (ascending)  -AE     Row Name 07/20/21 1352          Stairs Within Home, Primary    Number of Stairs, Within Home, Primary  none  -AE     Row Name 07/20/21 1354          Cognition    Orientation Status (Cognition)  oriented x 4  -AE     Row Name 07/20/21 1359          Safety Issues, Functional Mobility    Safety Issues Affecting Function (Mobility)  safety precautions follow-through/compliance  -AE     Impairments Affecting Function (Mobility)  motor control;pain;strength  -AE       User Key  (r) = Recorded By, (t) = Taken By, (c) = Cosigned By    Initials Name Provider Type    AE Nina Craig PT Physical Therapist        Mobility     Row Name 07/20/21 1357          Bed Mobility    Bed Mobility  bed mobility (all) activities  -AE     All Activities, [place] (Bed Mobility)  minimum assist (75% patient effort);1 person assist  -AE     Comment (Bed Mobility)  flat bed no rails  -AE     Row Name 07/20/21 1355          Transfers    Comment (Transfers)  CGA all transfers with FWW  -AE     Row Name 07/20/21 1350          Bed-Chair Transfer    Bed-Chair  [place] (Transfers)  contact guard;1 person assist  -AE     Assistive Device (Bed-Chair Transfers)  walker, front-wheeled  -AE     Row Name 07/20/21 1356          Sit-Stand Transfer    Sit-Stand [place] (Transfers)  contact guard;1 person assist  -AE     Assistive Device (Sit-Stand Transfers)  walker, front-wheeled  -AE     Row Name 07/20/21 1356          Gait/Stairs (Locomotion)    [place] Level (Gait)  contact guard;1 person assist  -AE     Assistive Device (Gait)  walker, front-wheeled  -AE     Distance in Feet (Gait)  50ft  -AE     Deviations/Abnormal Patterns (Gait)  antalgic;left sided deviations;jonnathan decreased;festinating/shuffling;stride length decreased  -AE     Bilateral Gait Deviations  forward flexed posture;heel strike decreased  -AE     Left Sided Gait Deviations  weight shift ability decreased  -AE       User Key  (r) = Recorded By, (t) = Taken By, (c) = Cosigned By    Initials Name Provider Type    AE Nina Craig, PT Physical Therapist        Obj/Interventions     Row Name 07/20/21 [facility]          Range of Motion Comprehensive    Comment, General Range of Motion  L hip WFL per surgical precautions  -AE     Row Name 07/20/21 [facility]3          Strength Comprehensive (MMT)    Comment, General Manual Muscle Testing (MMT) Assessment  generalized weakness  -AE     Row Name 07/20/21 [facility]6          Motor Skills    Therapeutic Exercise  hip L SONA x 10  -AE       User Key  (r) = Recorded By, (t) = Taken By, (c) = Cosigned By    Initials Name Provider Type    AE Nina Craig, PT Physical Therapist        Goals/Plan     Row Name 07/20/21 8903          Transfer Goal 1 (PT)    Activity/Assistive Device (Transfer Goal 1, PT)  transfers, all  -AE     [place] Level/Cues Needed (Transfer Goal 1, PT)  standby assist;1 person assist  -AE     Time Frame (Transfer Goal 1, PT)  3 days  -AE     Progress/Outcome (Transfer Goal 1, PT)  continuing progress toward goal  -AE     Row Name 07/20/21 0814           Gait Training Goal 1 (PT)    Activity/Assistive Device (Gait Training Goal 1, PT)  gait (walking locomotion);assistive device use  -AE     [place] Level (Gait Training Goal 1, PT)  standby assist  -AE     Distance (Gait Training Goal 1, PT)  100ft  -AE     Time Frame (Gait Training Goal 1, PT)  3 days  -AE     Progress/Outcome (Gait Training Goal 1, PT)  continuing progress toward goal  -AE     Row Name 07/20/21 5497          ROM Goal 1 (PT)    ROM Goal 1 (PT)  2 steps R HR  -AE     Time Frame (ROM Goal 1, PT)  3 days  -AE     Progress/Outcome (ROM Goal 1, PT)  continuing progress toward goal  -AE       User Key  (r) = Recorded By, (t) = Taken By, (c) = Cosigned By    Initials Name Provider Type    AE Nina Craig, PT Physical Therapist        Clinical Impression     Row Name 07/20/21 3629          Pain    Additional Documentation  Pain Scale: Numbers Pre/Post-Treatment (Group)  -AE     [facility] Name 07/20/21 1338          Pain Scale: Numbers Pre/Post-Treatment    Pretreatment Pain Rating  8/10  -AE     Posttreatment Pain Rating  8/10  -AE     Pain Location - Side  Left  -AE     Pain Location - Orientation  incisional  -AE     Pain Location  hip  -AE     Pain Intervention(s)  Repositioned;Ambulation/increased activity;Rest  -AE     Row Name 07/20/21 2486          Plan of Care Review    Plan of Care Reviewed With  patient;spouse;family  -AE     Row Name 07/20/21 9329          Therapy Assessment/Plan (PT)    Patient/Family Therapy Goals Statement (PT)  wants pain to go away  -AE     Rehab Potential (PT)  good, to achieve stated therapy goals  -AE     Criteria for Skilled Interventions Met (PT)  yes;meets criteria  -AE     Row Name 07/20/21 9263          Positioning and Restraints    Pre-Treatment Position  in bed  -AE     Post Treatment Position  chair  -AE     In Chair  reclined;call light within reach;encouraged to call for assist;exit alarm on  -AE       User Key  (r) = Recorded By, (t) = Taken By,  (c) = Cosigned By    Initials Name Provider Type    Nina Angel PT Physical Therapist        Outcome Measures     Row Name 07/20/21 1358          How much help from another person do you currently need...    Turning from your back to your side while in flat bed without using bedrails?  4  -AE     Moving from lying on back to sitting on the side of a flat bed without bedrails?  3  -AE     Moving to and from a bed to a chair (including a wheelchair)?  3  -AE     Standing up from a chair using your arms (e.g., wheelchair, bedside chair)?  3  -AE     Climbing 3-5 steps with a railing?  2  -AE     To walk in hospital room?  3  -AE     AM-PAC 6 Clicks Score (PT)  18  -AE     Row Name 07/20/21 1358          Functional Assessment    Outcome Measure Options  AM-PAC 6 Clicks Basic Mobility (PT)  -AE       User Key  (r) = Recorded By, (t) = Taken By, (c) = Cosigned By    Initials Name Provider Type    Nina Angel PT Physical Therapist                       Physical Therapy Education                 Title: PT OT SLP Therapies (Done)     Topic: Physical Therapy (Done)     Point: Mobility training (Done)     Learning Progress Summary           Patient Acceptance, E,TB, VU,NR by AE at 7/20/2021 1358   Family Acceptance, E,TB, VU,NR by AE at 7/20/2021 1358   Significant Other Acceptance, E,TB, VU,NR by AE at 7/20/2021 1358                   Point: Home exercise program (Done)     Learning Progress Summary           Patient Acceptance, E,TB, VU,NR by AE at 7/20/2021 1358   Family Acceptance, E,TB, VU,NR by AE at 7/20/2021 1358   Significant Other Acceptance, E,TB, VU,NR by AE at 7/20/2021 1358                   Point: Body mechanics (Done)     Learning Progress Summary           Patient Acceptance, E,TB, VU,NR by AE at 7/20/2021 1358   Family Acceptance, E,TB, VU,NR by AE at 7/20/2021 1358   Significant Other Acceptance, E,TB, VU,NR by AE at 7/20/2021 1358                   Point: Precautions (Done)     Learning  Progress Summary           Patient Acceptance, E,TB, VU,NR by AE at 7/20/2021 1358   Family Acceptance, E,TB, VU,NR by AE at 7/20/2021 1358   Significant Other Acceptance, E,TB, VU,NR by AE at 7/20/2021 1358                               User Key     Initials Effective Dates Name Provider Type Discipline    AE 06/16/21 -  Nina Craig, SENIA Physical Therapist PT              PT Recommendation and Plan  Planned Therapy Interventions (PT): balance training, bed mobility training, gait training, home exercise program, manual therapy techniques, motor coordination training, neuromuscular re-education, patient/family education, postural re-education, ROM (range of motion), stair training, strengthening, stretching, transfer training  Plan of Care Reviewed With: patient, spouse, family     Time Calculation:   PT Charges     Row Name 07/20/21 1420             Time Calculation    Start Time  1345  -AE      Stop Time  1430  -AE      Time Calculation (min)  45 min  -AE      PT Received On  07/20/21  -AE      PT - Next Appointment  07/21/21  -AE      PT Goal Re-Cert Due Date  07/23/21  -AE         Time Calculation- PT    Total Timed Code Minutes- PT  30 minute(s)  -AE        User Key  (r) = Recorded By, (t) = Taken By, (c) = Cosigned By    Initials Name Provider Type    AE Nina Craig PT Physical Therapist        Therapy Charges for Today     Code Description Service Date Service Provider Modifiers Qty    23752029059 HC PT EVAL LOW COMPLEXITY 2 7/20/2021 Nina Craig, PT GP 1    82177363676 HC GAIT TRAINING EA 15 MIN 7/20/2021 Nina Craig, PT GP 1    12595139777 HC PT THER PROC EA 15 MIN 7/20/2021 Nina Craig, PT GP 1          PT G-Codes  Outcome Measure Options: AM-PAC 6 Clicks Basic Mobility (PT)  AM-PAC 6 Clicks Score (PT): 18    Nina Craig PT  7/20/2021

## 2021-07-20 NOTE — ANESTHESIA POSTPROCEDURE EVALUATION
"Patient: Marylu Georges    Procedure Summary     Date: 07/20/21 Room / Location: [facility] OR 21 / [facility] MAIN OR    Anesthesia Start: 0830 Anesthesia Stop: 1056    Procedure: Posterior LEFT TOTAL HIP ARTHROPLASTY NATALY NAVIGATION (Left Hip) Diagnosis:       Arthritis of left hip      (Arthritis of left hip [M16.12])    Surgeons: Luis M Leonard MD Provider: Frank Pacheco MD    Anesthesia Type: general ASA Status: 3          Anesthesia Type: general    Vitals  Vitals Value Taken Time   /100 07/20/21 1200   Temp 36.4 °C (97.6 °F) 07/20/21 1057   Pulse 48 07/20/21 1214   Resp 18 07/20/21 1130   SpO2 100 % 07/20/21 1214   Vitals shown include unvalidated device data.        Post Anesthesia Care and Evaluation    Patient location during evaluation: PACU  Patient participation: complete - patient participated  Level of consciousness: awake and alert  Pain management: adequate  Airway patency: patent  Anesthetic complications: No anesthetic complications    Cardiovascular status: acceptable  Respiratory status: acceptable  Hydration status: acceptable    Comments: /95   Pulse 55   Temp 36.4 °C (97.6 °F) (Oral)   Resp 18   Ht 165.1 cm (65\")   Wt 73.2 kg (161 lb 6.4 oz)   LMP  (LMP Unknown)   SpO2 98%   BMI 26.86 kg/m²       "

## 2021-07-20 NOTE — ADDENDUM NOTE
Addendum  created 07/20/21 1434 by Frank Pacheco MD    Attestation recorded in Intraprocedure, Intraprocedure Attestations filed

## 2021-07-20 NOTE — CASE MANAGEMENT/SOCIAL WORK
Discharge Planning Assessment  [facility]     Patient Name: Marylu Georges  MRN: [id number]  Today's Date: 7/20/2021    Admit Date: 7/20/2021    Discharge Needs Assessment    No documentation.       Discharge Plan     Row Name 07/20/21 1531       Plan    Plan  Home with family support & [facility].    Patient/Family in Agreement with Plan  yes    Plan Comments  Spoke with the patient, verified current information and explained the role of the CCP. Patient said she has family/friend support. She plans to d/c home with family/friend support &  PT. She requests [facility]. Referral sent in [employer]. Patient said her home address is: [address] [place] [address]. She also said her family will transport her home by car at d/c. No other needs identified.        Continued Care and Services - Admitted Since 7/20/2021     Home Medical Care     Service Provider Request Status Selected Services Address Phone Fax Patient Preferred    [facility]  Pending - Request Sent N/A [address] [facility] [address] [phone] --                    Philomena Musa RN

## 2021-07-20 NOTE — PLAN OF CARE
"Pt is a 62 yo F POD0 L SONA-posterior. Pt educated on posterior precautions, lives in [facility] with 2small steps R handrail to access. Owns all DME including FWW and elevated commode. Pt is CGA for all mobility including bed mobility with flat bed no rails, transfers, and gait up to 50ft with FWW. Pt short and argumentative throughout therapy session. Also complained of \"vertigo\" symptoms but would not go into detail. States it happens with position changes or riding her horse. After completing 10 reps of L SONA protocol following posterior precautions, patient stated she was no longer dizzy sitting still in the chair. Educated patient on importance of mobility and being upright post-surgery, family present throughout session. Skilled PT needed to address above impairments.  DC recs include home with assist and HHPT once medically cleared.      Problem: Adult Inpatient Plan of Care  Goal: Plan of Care Review  Outcome: Ongoing, Progressing  Flowsheets (Taken 7/20/2021 1357)  Plan of Care Reviewed With:  • patient  • spouse  • family   Goal Outcome Evaluation:  Plan of Care Reviewed With: patient, spouse, family              "

## 2021-07-20 NOTE — ANESTHESIA PROCEDURE NOTES
Airway  Urgency: elective    Date/Time: 7/20/2021 8:44 AM  Airway not difficult    General Information and Staff    Patient location during procedure: OR    Indications and Patient Condition  Indications for airway management: airway protection    Preoxygenated: yes  MILS maintained throughout  Mask difficulty assessment: 1 - vent by mask    Final Airway Details  Final airway type: endotracheal airway      Successful airway: ETT  Cuffed: yes   Successful intubation technique: direct laryngoscopy  Facilitating devices/methods: intubating stylet  Endotracheal tube insertion site: oral  Blade: Jonathon  Blade size: 3  ETT size (mm): 7.0  Cormack-Lehane Classification: grade I - full view of glottis  Placement verified by: chest auscultation and capnometry   Measured from: lips  ETT/EBT  to lips (cm): 22  Number of attempts at approach: 1  Assessment: lips, teeth, and gum same as pre-op and atraumatic intubation

## 2021-07-20 NOTE — ANESTHESIA PREPROCEDURE EVALUATION
Anesthesia Evaluation     Patient summary reviewed and Nursing notes reviewed   NPO Solid Status: > 8 hours  NPO Liquid Status: > 4 hours           Airway   Mallampati: II  Neck ROM: full  No difficulty expected  Dental - normal exam     Pulmonary     breath sounds clear to auscultation  Cardiovascular     Rhythm: regular    (+) hypertension,       Neuro/Psych  (+) CVA, dizziness/light headedness, syncope,     GI/Hepatic/Renal/Endo      Musculoskeletal     Abdominal    Substance History      OB/GYN          Other                        Anesthesia Plan    ASA 3     general     intravenous induction     Anesthetic plan, all risks, benefits, and alternatives have been provided, discussed and informed consent has been obtained with: patient.

## 2021-07-20 NOTE — PLAN OF CARE
Goal Outcome Evaluation:              Outcome Summary: Received pt from PACU, s/p LT t. hip , kept IVF infusing r/t to nausea, in room air, PRN pain medicine and zofran given, seen by PT ambulated to sarkar, voiding well, needs attended, will continue to monitor. [place] dressing C/D/I.

## 2021-07-21 VITALS
DIASTOLIC BLOOD PRESSURE: 55 MMHG | HEART RATE: 69 BPM | BODY MASS INDEX: 26.89 KG/M2 | HEIGHT: 65 IN | SYSTOLIC BLOOD PRESSURE: 97 MMHG | WEIGHT: 161.4 LBS | TEMPERATURE: 98.7 F | OXYGEN SATURATION: 95 % | RESPIRATION RATE: 16 BRPM

## 2021-07-21 LAB
HCT VFR BLD AUTO: 28.9 % (ref 34–46.6)
HGB BLD-MCNC: 9.5 G/DL (ref 12–15.9)

## 2021-07-21 PROCEDURE — 85014 HEMATOCRIT: CPT | Performed by: NURSE PRACTITIONER

## 2021-07-21 PROCEDURE — 85018 HEMOGLOBIN: CPT | Performed by: NURSE PRACTITIONER

## 2021-07-21 PROCEDURE — G0378 HOSPITAL OBSERVATION PER HR: HCPCS

## 2021-07-21 PROCEDURE — 97110 THERAPEUTIC EXERCISES: CPT

## 2021-07-21 PROCEDURE — 20985 CPTR-ASST DIR MS PX: CPT | Performed by: ORTHOPAEDIC SURGERY

## 2021-07-21 PROCEDURE — 99024 POSTOP FOLLOW-UP VISIT: CPT | Performed by: NURSE PRACTITIONER

## 2021-07-21 RX ORDER — PSEUDOEPHEDRINE HCL 30 MG
100 TABLET ORAL 2 TIMES DAILY
Qty: 60 CAPSULE | Refills: 0 | Status: SHIPPED | OUTPATIENT
Start: 2021-07-21 | End: 2021-08-20

## 2021-07-21 RX ORDER — HYDROCODONE BITARTRATE AND ACETAMINOPHEN 7.5; 325 MG/1; MG/1
TABLET ORAL
Qty: 60 TABLET | Refills: 0 | Status: SHIPPED | OUTPATIENT
Start: 2021-07-21 | End: 2021-08-04

## 2021-07-21 RX ORDER — FAMOTIDINE 40 MG/1
40 TABLET, FILM COATED ORAL DAILY
Qty: 30 TABLET | Refills: 0 | Status: SHIPPED | OUTPATIENT
Start: 2021-07-21 | End: 2021-08-20

## 2021-07-21 RX ADMIN — MUPIROCIN 1 APPLICATION: 20 OINTMENT TOPICAL at 09:06

## 2021-07-21 RX ADMIN — MELOXICAM 15 MG: 15 TABLET ORAL at 09:06

## 2021-07-21 RX ADMIN — HYDROCODONE BITARTRATE AND ACETAMINOPHEN 1 TABLET: 7.5; 325 TABLET ORAL at 03:00

## 2021-07-21 RX ADMIN — RIVAROXABAN 10 MG: 10 TABLET, FILM COATED ORAL at 09:06

## 2021-07-21 RX ADMIN — POLYETHYLENE GLYCOL 3350 17 G: 17 POWDER, FOR SOLUTION ORAL at 09:06

## 2021-07-21 RX ADMIN — FAMOTIDINE 40 MG: 20 TABLET, FILM COATED ORAL at 09:06

## 2021-07-21 RX ADMIN — DOCUSATE SODIUM 100 MG: 100 CAPSULE ORAL at 09:06

## 2021-07-21 RX ADMIN — METOPROLOL SUCCINATE 25 MG: 25 TABLET, EXTENDED RELEASE ORAL at 09:06

## 2021-07-21 RX ADMIN — HYDROCODONE BITARTRATE AND ACETAMINOPHEN 1 TABLET: 7.5; 325 TABLET ORAL at 11:33

## 2021-07-21 RX ADMIN — HYDROCODONE BITARTRATE AND ACETAMINOPHEN 2 TABLET: 7.5; 325 TABLET ORAL at 06:57

## 2021-07-21 RX ADMIN — LETROZOLE 2.5 MG: 2.5 TABLET ORAL at 09:06

## 2021-07-21 NOTE — PROGRESS NOTES
Case Management Discharge Note      Final Note: Home with [facility]         Selected Continued Care - Discharged on 7/21/2021 Admission date: 7/20/2021 - Discharge disposition: Home or Self Care    Destination    No services have been selected for the patient.              Durable Medical Equipment    No services have been selected for the patient.              Dialysis/Infusion    No services have been selected for the patient.              Home Medical Care Coordination complete.    Service Provider Selected Services Address Phone Fax Patient Preferred    [facility]  Home Health Services [address] [place] [address] [phone] --          Therapy    No services have been selected for the patient.              Community Resources    No services have been selected for the patient.              Community & DME    No services have been selected for the patient.                       Final Discharge Disposition Code: 01 - home or self-care

## 2021-07-21 NOTE — DISCHARGE SUMMARY
" Orthopedic Discharge Summary      Patient: Marylu Georges  YOB: 1960  Medical Record Number: [id number]    Attending Physician: Luis M Leonard MD  Consulting Physician(s):   Consults     No orders found for last 30 day(s).          Date of Admission: 7/20/2021  6:26 AM  Date of Discharge: 7/21/2021    Discharge Diagnosis: ME TOTAL HIP ARTHROPLASTY [99967] (Posterior LEFT TOTAL HIP ARTHROPLASTY NATALY NAVIGATION),   Acute Blood Loss Anemia, stable  Post-operative Pain  Limited mobility, requires use of walker and assistance when OOB.    Presenting Problem/History of Present Illness: Arthritis of left hip [M16.12]    Allergies:   Allergies   Allergen Reactions   • Benadryl [Diphenhydramine] Itching     INCREASES BLOOD PRESSURE   • Erythromycin GI Intolerance   • Levaquin [Levofloxacin] GI Intolerance   • Penicillins GI Intolerance       Discharge Medications       Discharge Medications      ASK your doctor about these medications      Instructions Start Date   acetaminophen 650 MG 8 hr tablet  Commonly known as: TYLENOL   1,950 mg, Oral, Every 8 Hours PRN      APPLE CIDER VINEGAR PO   1 tablet/day, Oral, Daily      aspirin 81 MG chewable tablet   81 mg, Oral, Nightly, HOLD X 7 DAYS PRIOR TO OR      Cholecalciferol 250 MCG (81972 UT) tablet   1 tablet, Oral, Patient stated dose as \"1500 mg\"       Chromium 200 MCG tablet   1 tablet, Oral, Patient was unable to state dose       diclofenac 75 MG EC tablet  Commonly known as: VOLTAREN   TAKE ONE TABLET BY MOUTH TWICE A DAY      letrozole 2.5 MG tablet  Commonly known as: FEMARA   2.5 mg, Oral, Daily      loperamide 2 MG capsule  Commonly known as: IMODIUM   2 mg, Oral, 4 Times Daily PRN      magnesium oxide 400 MG tablet  Commonly known as: MAG-OX   400 mg, Oral, 2 times daily, Patient stated dose as \"1500mg\"       meclizine 25 MG tablet  Commonly known as: ANTIVERT   25 mg, Oral, 3 Times Daily PRN      metoprolol succinate XL 25 MG 24 hr " Per Dr. Duncan Postin- if patient tolerates regular diet, she can be discharged. Page out to Dr. Jillian Starr. Patient tolerated clears and full liquid diet. No N/V. Patient had a 2-3 soft/loose bowel movements today. Negative C. Diff.    1800- Patient expressed c tablet  Commonly known as: TOPROL-XL   TAKE 1 TABLET BY MOUTH EVERY DAY      mupirocin 2 % nasal ointment  Commonly known as: BACTROBAN   Nasal, 2 Times Daily, AS DIRECTED       NON FORMULARY   1 tablet, Oral, Daily, TART CHERRY JUICE EXTRACT CAP       ondansetron 4 MG tablet  Commonly known as: ZOFRAN   4 mg, Oral, Every 8 Hours PRN      ribociclib succinate 200 MG tablet therapy pack tablet   600 mg, Oral, Daily, For 21 days and then 7 days off               Past Medical History:   Diagnosis Date   • Anemia in neoplastic disease    • Arthritis    • Breast cancer (CMS/HCC)     Left   • CVA (cerebral vascular accident) (CMS/HCC)    • Hip pain     RIGHT HIP... CYST   • History of fracture of leg 1987   • History of radiation therapy     LAST TREATMENT     • Hypertension    • Limited joint range of motion (ROM)     RIGHT HIP   • Skin sore     OPEN SORE LEFT BREAST   • Syncope    • Vertigo         Past Surgical History:   Procedure Laterality Date   • AXILLARY LYMPH NODE BIOPSY/EXCISION Right     LYMPH NODE UNDER RIGHT ARM-MALIGNANT (DOUBLE MASTECTOMY)   • BREAST BIOPSY Left     MALIGNANT   • FRACTURE SURGERY  1987    Leg   • HARDWARE REMOVAL     • MASTECTOMY W/ SENTINEL NODE BIOPSY Bilateral 9/16/2019    Procedure: BILATERLA MODIFIED RADICAL MASTECTOMY WITH BILATERAL SENTINEL LYMPH NODE BIOPSY;  Surgeon: Joby Barron Jr., MD;  Location: [facility] OR;  Service: General   • TOTAL HIP ARTHROPLASTY Right 3/2/2020    Procedure: RIGHT TOTAL HIP ARTHROPLASTY NATALY NAVIGATION;  Surgeon: Luis M Leonard MD;  Location: [facility] OR;  Service: Orthopedics;  Laterality: Right;   • VENOUS ACCESS DEVICE (PORT) INSERTION Right 2/1/2019    Procedure: INSERTION VENOUS ACCESS DEVICE;  Surgeon: Joby Barron Jr., MD;  Location: [facility];  Service: General   • VENOUS ACCESS DEVICE (PORT) REMOVAL N/A 10/30/2019    Procedure: Mediport Removal;  Surgeon: Joby Barron Jr., MD;  Location: [facility] OR;  Service:  General        Social History     Occupational History   • Occupation:      Employer: SELF-EMPLOYED   Tobacco Use   • Smoking status: Never Smoker   • Smokeless tobacco: Never Used   Vaping Use   • Vaping Use: Never used   Substance and Sexual Activity   • Alcohol use: Not Currently     Alcohol/week: 7.0 standard drinks     Types: 4 Glasses of wine, 3 Cans of beer per week     Comment: 2 CUPS DAILY   • Drug use: No   • Sexual activity: Not Currently     Partners: Male     Birth control/protection: Post-menopausal      Social History     Social History Narrative   • Not on file        Family History   Problem Relation Age of Onset   • Lung cancer Father    • Cancer Mother    • Breast cancer Mother    • Liver disease Mother    • Breast cancer Sister 48   • Breast cancer Maternal Grandmother    • Breast cancer Paternal Grandmother    • Breast cancer Maternal Uncle    • Malig Hyperthermia Neg Hx          Physical Exam: 61 y.o. female   Body mass index is 26.86 kg/m².  Facility age limit for growth percentiles is 20 years.  Vitals:    07/21/21 0700   BP: 97/55   Pulse: 69   Resp: 16   Temp: 98.7 °F (37.1 °C)   SpO2: 95%         General Appearance:    Alert, cooperative, in no acute distress                      Vitals:    07/20/21 1342 07/20/21 1946 07/20/21 2243 07/21/21 0700   BP: 117/77 113/72 95/58 97/55   BP Location: Right arm Right arm Right arm Right arm   Patient Position:  Lying Lying Lying   Pulse: 59 70 59 69   Resp: 16 16 16 16   Temp: 97.4 °F (36.3 °C) 98.9 °F (37.2 °C) 98.7 °F (37.1 °C) 98.7 °F (37.1 °C)   TempSrc: Oral Oral Oral Oral   SpO2: 95% 97% 98% 95%   Weight:       Height:            DIAGNOSTIC TESTS:   No visits with results within 3 Day(s) from this visit.   Latest known visit with results is:   Lab on 07/17/2021   Component Date Value Ref Range Status   • COVID19 07/17/2021 Not Detected  Not Detected - Ref. Range Final       Imaging Results (Last 72 Hours)     Procedure Component  Value Units Date/Time    XR Hip 1 View Without Pelvis Left (Surgery Only) [[id number]] Collected: 07/20/21 1128     Updated: 07/20/21 1132    Narrative:      XR HIP 1 VIEW WO PELVIS LEFT-  07/20/2021     HISTORY: Left hip arthroplasty.     The acetabular and proximal femoral components of the left hip  prosthesis are well-seated with no abnormal surrounding bony lucencies.  Soft tissue air is seen. No fractures or dislocations are seen.       Impression:      Satisfactory postoperative appearance of the left hip.     This report was finalized on 7/20/2021 11:29 AM by Dr. Anatoliy Hughes M.D.             Hospital Course:  61 y.o. female admitted to [facility], [place], operated by [facility] to services of Luis M Leonard MD with Arthritis of left hip [M16.12] on 7/20/2021 and underwent IA TOTAL HIP ARTHROPLASTY [79641] (Posterior LEFT TOTAL HIP ARTHROPLASTY NATALY NAVIGATION)  Per Luis M Leonard MD. Antibiotic and VTE prophylaxis were per [facility]IP protocols. Post-operatively the patient transferred to the post-operative floor where the patient underwent mobilization therapy that included active as well as passive ROM exercises. Opioids were titrated to achieve appropriate pain management to allow for participation in mobilization exercises. Vital signs are now stable. On the day of discharge the wound was clean, dry and intact and calf was soft and non tender and Homans sign was negative. Operative extremity neurovascular status remains intact.   Appropriate education re: incision care, activity levels, medications, and follow up visits was completed and all questions were answered. The patient is now deemed stable for discharge.    Condition on Discharge:  Stable    Total Joint Replacement Discharge Instructions: Patient is to continue with physical therapy exercises twice daily and continue working with the physical therapist as ordered  and should be up walking every 2 hours during the day in addition to the physical therapy exercises.  Patient may weight bear as tolerated unless otherwise specified. Continue to ice regularly. Do frequent ankle pumping exercises while you are sitting with legs elevated.  Patient also instructed on deep breathing, coughing, and using  incentive spirometer during hospitalization and encouraged to continue to use at home regularly.        VI. FOLLOW-UP VISITS:  ? Follow up in the office with Dr Luis M Leonard in 2 weeks - If already scheduled (see Future Appointments) for date and time, if not yet scheduled, patient to call the office at [phone] to schedule. Prescriptions were given for pain medication, nausea, constipation, and blood thinner therapy.    ? If you have any concerns or suspected complications prior to your follow up visit, please call your surgeon's office. Do not wait until your appointment time if you suspect complications. These will need to be addressed in the office promptly.      Future Appointments   Date Time Provider Department Center   8/4/2021  3:00 PM Lyla Fish APRN [facility] [address] [facility]   8/26/2021  8:10 AM LAB CHAIR [address] [facility]  [facility]   8/26/2021  8:20 AM Elie Dixon MD [facility]   12/10/2021 11:40 AM Danni Odell MD [facility]         Discharge Disposition Plan:today to home, home health and when cleared by physical therapy as safe for discharge    Date: 7/21/2021    ISAIAS Crabtree

## 2021-07-21 NOTE — PLAN OF CARE
Goal Outcome Evaluation:  Plan of Care Reviewed With: patient        Progress: improving   Pt is a post op day 1 of a left total hip. Dressing is clean dry and intact. Pt continues with Mepilex dressing. Pt has ambulated to the bathroom with an assist x1. Voiding function intact. Pt continues with PO pain meds that provide relief. Pt educated on the importance of monitoring blood pressures related to comorbidity of HTN. Pt voiced understanding. Pt is resting at this time, will continue to monitor.

## 2021-07-21 NOTE — PLAN OF CARE
Goal Outcome Evaluation:      Per PT notes, pt independent with functional mobility and signed off. Spoke to pt and pt reports she understands posterior hip precautions from previous hip sx. Was able to teach back to OT. Has all Ae/hip kit and reports no difficulty with ADL with posterior hip precautions.

## 2021-07-21 NOTE — PROGRESS NOTES
"    Orthopedic Total Joint Progress Note        Patient: Marylu Georges    Date of Admission: 7/20/2021  6:26 AM    YOB: 1960    Medical Record Number: [id number]    Attending Physician: Luis M Leonard MD      POD # 1 Day Post-Op Procedure(s) (LRB):  Posterior LEFT TOTAL HIP ARTHROPLASTY NATALY NAVIGATION (Left)       Systemic or Specific Complaints: Patient is doing well postoperatively and currently has no complaints.  Pain is well controlled with oral pain medication.  She has worked with physical therapy and did well.  She is urinating without difficulty.  No issues with incision/wound.    Allergies:   Allergies   Allergen Reactions   • Benadryl [Diphenhydramine] Itching     INCREASES BLOOD PRESSURE   • Erythromycin GI Intolerance   • Levaquin [Levofloxacin] GI Intolerance   • Penicillins GI Intolerance       Medications:   Current Medications:  Scheduled Meds:docusate sodium, 100 mg, Oral, BID  famotidine, 40 mg, Oral, Daily  letrozole, 2.5 mg, Oral, Daily  meloxicam, 15 mg, Oral, Daily  metoprolol succinate XL, 25 mg, Oral, Daily  mupirocin, , Each Nare, BID  polyethylene glycol, 17 g, Oral, Daily  rivaroxaban, 10 mg, Oral, Daily      Continuous Infusions:lactated ringers, 100 mL/hr, Last Rate: Stopped (07/20/21 2346)      PRN Meds:.•  acetaminophen  •  bisacodyl  •  HYDROcodone-acetaminophen  •  HYDROcodone-acetaminophen  •  HYDROmorphone **AND** naloxone  •  magnesium hydroxide  •  meclizine  •  ondansetron **OR** ondansetron      Physical Exam: 61 y.o. female   Wt Readings from Last 3 Encounters:   07/20/21 73.2 kg (161 lb 6.4 oz)   07/08/21 74.9 kg (165 lb 3.2 oz)   07/07/21 73.8 kg (162 lb 12.8 oz)     Ht Readings from Last 3 Encounters:   07/20/21 165.1 cm (65\")   07/08/21 165.1 cm (65\")   07/07/21 165.1 cm (65\")     Body mass index is 26.86 kg/m².    Vitals:    07/20/21 1342 07/20/21 1946 07/20/21 2243 07/21/21 0700   BP: 117/77 113/72 95/58 97/55   BP Location: Right arm Right arm " Right arm Right arm   Patient Position:  Lying Lying Lying   Pulse: 59 70 59 69   Resp: 16 16 16 16   Temp: 97.4 °F (36.3 °C) 98.9 °F (37.2 °C) 98.7 °F (37.1 °C) 98.7 °F (37.1 °C)   TempSrc: Oral Oral Oral Oral   SpO2: 95% 97% 98% 95%   Weight:       Height:            General Appearance:    General: alert and oriented         Abdomen/:     soft non-tender, non-distended, voiding without difficulty       Extremities:   Operative extremity neurovascular status intact. ROM appropriate.  Incision intact w/out signs or symptoms of infection.  No cyanosis, calf is soft and nontender.     Activity: Mobilizing Per P.T.   Weight Bearing: As Tolerated    Diagnostic Tests:   No visits with results within 3 Day(s) from this visit.   Latest known visit with results is:   Lab on 07/17/2021   Component Date Value Ref Range Status   • COVID19 07/17/2021 Not Detected  Not Detected - Ref. Range Final       Imaging Results (Last 72 Hours)     Procedure Component Value Units Date/Time    XR Hip 1 View Without Pelvis Left (Surgery Only) [[id number]] Collected: 07/20/21 1128     Updated: 07/20/21 1132    Narrative:      XR HIP 1 VIEW WO PELVIS LEFT-  07/20/2021     HISTORY: Left hip arthroplasty.     The acetabular and proximal femoral components of the left hip  prosthesis are well-seated with no abnormal surrounding bony lucencies.  Soft tissue air is seen. No fractures or dislocations are seen.       Impression:      Satisfactory postoperative appearance of the left hip.     This report was finalized on 7/20/2021 11:29 AM by Dr. Anatoliy Hughes M.D.             Personally viewed ortho images and report     Assessment:  Doing well 1 Day Post-Op following total joint replacement  Acute Blood Loss Anemia, stable  Post-operative Pain  Limited mobility, requires use of walker and assistance when OOB.    Patient Active Problem List   Diagnosis   • Malignant neoplasm of overlapping sites of left breast in female, estrogen receptor  positive (CMS/HCC)   • Family history of breast cancer in female   • Syncope   • Malignant neoplasm of overlapping sites of both breasts in female, estrogen receptor positive (CMS/HCC)   • Hypertension   • Arthritis of left hip   • Encounter for long-term (current) use of other medications        Plan:    Consults: none  Continue to monitor labs and/or v/s, for tolerance to post op blood loss.  Continue efforts to increase mobilization.  Continue Pain Control Measures.  Continue incisional Care.  DVT prophylaxis.  Follow up in office with Luis M Leonard M.D. In 2 weeks.    Discharge Plan:today to home, home health and when cleared by physical therapy as safe for discharge    Date: 7/21/2021  ISAIAS Crabtree

## 2021-07-21 NOTE — THERAPY DISCHARGE NOTE
Patient Name: Marylu Georges  : 1960    MRN: [id number]                              Today's Date: 2021       Admit Date: 2021    Visit Dx:     ICD-10-CM ICD-9-CM   1. Arthritis of left hip  M16.12 716.95     Patient Active Problem List   Diagnosis   • Malignant neoplasm of overlapping sites of left breast in female, estrogen receptor positive (CMS/HCC)   • Family history of breast cancer in female   • Syncope   • Malignant neoplasm of overlapping sites of both breasts in female, estrogen receptor positive (CMS/HCC)   • Hypertension   • Arthritis of left hip   • Encounter for long-term (current) use of other medications     Past Medical History:   Diagnosis Date   • Anemia in neoplastic disease    • Arthritis    • Breast cancer (CMS/HCC)     Left   • CVA (cerebral vascular accident) (CMS/HCC)    • Hip pain     RIGHT HIP... CYST   • History of fracture of leg    • History of radiation therapy     LAST TREATMENT     • Hypertension    • Limited joint range of motion (ROM)     RIGHT HIP   • Skin sore     OPEN SORE LEFT BREAST   • Syncope    • Vertigo      Past Surgical History:   Procedure Laterality Date   • AXILLARY LYMPH NODE BIOPSY/EXCISION Right     LYMPH NODE UNDER RIGHT ARM-MALIGNANT (DOUBLE MASTECTOMY)   • BREAST BIOPSY Left     MALIGNANT   • FRACTURE SURGERY      Leg   • HARDWARE REMOVAL     • MASTECTOMY W/ SENTINEL NODE BIOPSY Bilateral 2019    Procedure: BILATERLA MODIFIED RADICAL MASTECTOMY WITH BILATERAL SENTINEL LYMPH NODE BIOPSY;  Surgeon: Joby Barron Jr., MD;  Location: [facility];  Service: General   • TOTAL HIP ARTHROPLASTY Right 3/2/2020    Procedure: RIGHT TOTAL HIP ARTHROPLASTY NATALY NAVIGATION;  Surgeon: Luis M Leonard MD;  Location: [facility] OR;  Service: Orthopedics;  Laterality: Right;   • VENOUS ACCESS DEVICE (PORT) INSERTION Right 2019    Procedure: INSERTION VENOUS ACCESS DEVICE;  Surgeon: Joby Barron Jr., MD;  Location: [facility]  OR [facility];  Service: General   • VENOUS ACCESS DEVICE (PORT) REMOVAL N/A 10/30/2019    Procedure: Mediport Removal;  Surgeon: Joby Barron Jr., MD;  Location: [facility] OR;  Service: General     General Information     Row Name 07/21/21 1113          Physical Therapy Time and Intention    Document Type  therapy note (daily note);discharge treatment  -MS     Mode of Treatment  physical therapy;individual therapy  -MS     Row Name 07/21/21 1113          General Information    Patient Profile Reviewed  yes  -MS     Existing Precautions/Restrictions  hip, posterior  -MS     Barriers to Rehab  none identified  -MS     Row Name 07/21/21 1113          Cognition    Orientation Status (Cognition)  oriented x 3  -MS     Row Name 07/21/21 1113          Safety Issues, Functional Mobility    Comment, Safety Issues/Impairments (Mobility)  Gait belt used for safety.  -MS       User Key  (r) = Recorded By, (t) = Taken By, (c) = Cosigned By    Initials Name Provider Type    MS Hardeep Miller, PT Physical Therapist        Mobility     Row Name 07/21/21 1114          Bed Mobility    Bed Mobility  supine-sit;sit-supine  -MS     Supine-Sit [place] (Bed Mobility)  independent  -MS     Sit-Supine [place] (Bed Mobility)  independent  -MS     Row Name 07/21/21 1114          Sit-Stand Transfer    Sit-Stand [place] (Transfers)  independent  -MS     Assistive Device (Sit-Stand Transfers)  walker, front-wheeled  -MS     Row Name 07/21/21 1114          Gait/Stairs (Locomotion)    [place] Level (Gait)  independent  -MS     Assistive Device (Gait)  walker, front-wheeled  -MS     Distance in Feet (Gait)  100 feet  -MS     Deviations/Abnormal Patterns (Gait)  jonnathan decreased  -MS     [place] Level (Stairs)  stand by assist  -MS     Handrail Location (Stairs)  right side (ascending)  -MS     Number of Steps (Stairs)  4  -MS     Ascending Technique (Stairs)  step-to-step  -MS     Descending Technique (Stairs)   step-to-step  -MS     Row Name 07/21/21 1114          Mobility    Extremity Weight-bearing Status  left lower extremity  -MS     Left Lower Extremity (Weight-bearing Status)  weight-bearing as tolerated (WBAT)  -MS       User Key  (r) = Recorded By, (t) = Taken By, (c) = Cosigned By    Initials Name Provider Type    Hardeep Butt, PT Physical Therapist        Obj/Interventions     Row Name 07/21/21 1114          Motor Skills    Therapeutic Exercise  -- Left THR ther. ex. program x 15 reps completed  -MS       User Key  (r) = Recorded By, (t) = Taken By, (c) = Cosigned By    Initials Name Provider Type    Hardeep Butt, PT Physical Therapist        Goals/Plan    No documentation.       Clinical Impression     [facility] Name 07/21/21 1115          Pain    Additional Documentation  Pain Scale: Numbers Pre/Post-Treatment (Group)  -MS     Row Name 07/21/21 1115          Pain Scale: Numbers Pre/Post-Treatment    Pretreatment Pain Rating  3/10  -MS     Posttreatment Pain Rating  3/10  -MS     Pain Location - Side  Left  -MS     Pain Location  hip  -MS     Row Name 07/21/21 1115          Positioning and Restraints    Pre-Treatment Position  sitting in chair/recliner  -MS     Post Treatment Position  chair  -MS     In Chair  notified nsg;reclined;sitting;call light within reach;encouraged to call for assist;exit alarm on;with family/caregiver  -MS       User Key  (r) = Recorded By, (t) = Taken By, (c) = Cosigned By    Initials Name Provider Type    Hardeep Butt, PT Physical Therapist        Outcome Measures     Row Name 07/21/21 1115          How much help from another person do you currently need...    Turning from your back to your side while in flat bed without using bedrails?  4  -MS     Moving from lying on back to sitting on the side of a flat bed without bedrails?  4  -MS     Moving to and from a bed to a chair (including a wheelchair)?  4  -MS     Standing up from a chair using your arms (e.g.,  wheelchair, bedside chair)?  4  -MS     Climbing 3-5 steps with a railing?  3  -MS     To walk in hospital room?  4  -MS     AM-PAC 6 Clicks Score (PT)  23  -MS     Row Name 07/21/21 1115          Functional Assessment    Outcome Measure Options  AM-PAC 6 Clicks Basic Mobility (PT)  -MS       User Key  (r) = Recorded By, (t) = Taken By, (c) = Cosigned By    Initials Name Provider Type    MS PorterMillerHardeep, PT Physical Therapist        Physical Therapy Education                 Title: PT OT SLP Therapies (Resolved)     Topic: Physical Therapy (Resolved)     Point: Mobility training (Resolved)     Learning Progress Summary           Patient Acceptance, E,D, VU,DU by MS at 7/21/2021 1115    Acceptance, E,TB, VU,NR by AE at 7/20/2021 1358   Family Acceptance, E,TB, VU,NR by AE at 7/20/2021 1358   Significant Other Acceptance, E,TB, VU,NR by AE at 7/20/2021 1358                   Point: Home exercise program (Resolved)     Learning Progress Summary           Patient Acceptance, E,D, VU,DU by MS at 7/21/2021 1115    Acceptance, E,TB, VU,NR by AE at 7/20/2021 1358   Family Acceptance, E,TB, VU,NR by AE at 7/20/2021 1358   Significant Other Acceptance, E,TB, VU,NR by AE at 7/20/2021 1358                   Point: Body mechanics (Resolved)     Learning Progress Summary           Patient Acceptance, E,D, VU,DU by MS at 7/21/2021 1115    Acceptance, E,TB, VU,NR by AE at 7/20/2021 1358   Family Acceptance, E,TB, VU,NR by AE at 7/20/2021 1358   Significant Other Acceptance, E,TB, VU,NR by AE at 7/20/2021 1358                   Point: Precautions (Resolved)     Learning Progress Summary           Patient Acceptance, E,D, VU,DU by MS at 7/21/2021 1115    Acceptance, E,TB, VU,NR by AE at 7/20/2021 1358   Family Acceptance, E,TB, VU,NR by AE at 7/20/2021 1358   Significant Other Acceptance, E,TB, VU,NR by AE at 7/20/2021 1358                               User Key     Initials Effective Dates Name Provider Type Discipline    MS  06/16/21 -  Hardeep Miller PT Physical Therapist PT    AE 06/16/21 -  Nina Craig PT Physical Therapist PT              PT Recommendation and Plan     Plan of Care Reviewed With: patient  Outcome Summary: Pt. is currently independent/SBA with all functional mobility and has met inpt. P.T. goals.  Pt./family with no further questions/concerns regarding functional mobility or home safety.  Plan for discharge home this date.  Will sign off. Nursing notified.     Time Calculation:   PT Charges     Row Name 07/21/21 1116             Time Calculation    Start Time  0918  -MS      Stop Time  0934  -MS      Time Calculation (min)  16 min  -MS      PT Received On  07/21/21  -MS         Time Calculation- PT    Total Timed Code Minutes- PT  15 minute(s)  -MS        User Key  (r) = Recorded By, (t) = Taken By, (c) = Cosigned By    Initials Name Provider Type    Hardeep Butt, PT Physical Therapist        Therapy Charges for Today     Code Description Service Date Service Provider Modifiers Qty    72083872159 HC PT THER PROC EA 15 MIN 7/21/2021 Hardeep Miller, PT GP 1          PT G-Codes  Outcome Measure Options: AM-PAC 6 Clicks Basic Mobility (PT)  AM-PAC 6 Clicks Score (PT): 23    PT Discharge Summary  Reason for Discharge: Discharge from facility  Discharge Destination: Home with assist, Home with home health    Hardeep Miller, PT  7/21/2021

## 2021-07-23 NOTE — PROGRESS NOTES
RADIATION THERAPY TREATMENT SUMMARY  Patient: Marylu Georges  YOB: 1960  Diagnosis:    Malignant neoplasm of overlapping sites of left breast in female, estrogen receptor positive (CMS/HCC)  Staging form: Breast, AJCC 8th Edition  - Clinical stage from 1/22/2019: Stage IIIB (cT4c, cN3, cM0, G3, ER: Positive, IA: Positive, HER2: Negative) - Signed by Elie Dixon MD on 5/31/2019        Marylu Georges has recently completed the course of radiation therapy prescribed for the above-mentioned diagnosis.  Below, please find the specifics of the course of treatment delivered:    Radiation Details:    Tx Start Date  6/22/2021   Tx End date  7/2/2021  Intent   Palliative   Total Treatments 9    Prescription Name L  CARDIOPHRENIC  FATPAD  Site   Chest Wall, Left  Primary/Boost  Primary  Dose Per Fraction 300 cGy/Frac  Number of Fractions 9  Prescribe To  Volume PTV/PTVRED  2700 cGy  300 cGy/Frac  Mode    Photon  Technique  VMAT  Frequency  Once Daily  Energy   6X  Prescription Note PRESC 300X2 100% COVERS 85% PTV, REDUCE OFF OVERLAP 300X7100% BBCWMO65%PTV RED           Tolerance and Toxicities: she tolerated the treatments well , requiring no treatment breaks. she completed the treatments in 9 elapsed days.    Follow-up Plans: As she is plugged in with the [facility] group will not schedule her for follow-up here.

## 2021-08-04 ENCOUNTER — OFFICE VISIT (OUTPATIENT)
Dept: ORTHOPEDIC SURGERY | Facility: CLINIC | Age: 61
End: 2021-08-04

## 2021-08-04 VITALS — TEMPERATURE: 97.2 F | BODY MASS INDEX: 25.88 KG/M2 | HEIGHT: 66 IN | WEIGHT: 161 LBS

## 2021-08-04 DIAGNOSIS — Z96.642 STATUS POST TOTAL HIP REPLACEMENT, LEFT: Primary | ICD-10-CM

## 2021-08-04 PROCEDURE — 99024 POSTOP FOLLOW-UP VISIT: CPT | Performed by: NURSE PRACTITIONER

## 2021-08-04 PROCEDURE — 73502 X-RAY EXAM HIP UNI 2-3 VIEWS: CPT | Performed by: NURSE PRACTITIONER

## 2021-08-04 NOTE — PROGRESS NOTES
Marylu Georges : 1960 MRN: [id number] DATE: 2021  Body mass index is 25.99 kg/m².  Vitals:    21 1507   Temp: 97.2 °F (36.2 °C)         DIAGNOSIS: 2 week follow up left total hip     SUBJECTIVE:Patient returns today for 2 week follow up of left total hip replacement. She is doing well overall. Still having some pain/soreness in the thigh and buttock areas. No longer taking any pain medication. Ambulating with a cane. Completed HHPT and has outpatient therapy scheduled.     OBJECTIVE:   Exam:. The incision is healing appropriately. No sign of infection. Range of motion is progressing as expected. The calf is soft and nontender with a negative Homans sign.    DIAGNOSTIC STUDIES  2V AP&Lat of the left hip were done in the office today  Indication, findings and comparison: images were reviewed for evaluation of recent hip replacement. They demonstrate a well positioned, well aligned hip replacement without complicating factors noted. In comparison with previous films there has been interval implant placement.    ASSESSMENT: 2 week status post right hip replacement expected healing.    PLAN: 1) Incision open to air.   2) Order given for PT and pain medicine (as needed)   3)Continue ice PRN   4) continue xarelto until 4 weeks out from surgery   5) Follow up in 4 weeks with repeat Xrays of right hip (2 views)   6) Wait for 3 months after surgery for routine teeth cleaning and continue with taking a dose of antibiotics before dental procedures for 2 yrs post total joint replacement.    ISAIAS Crabtree  2021

## 2021-08-05 ENCOUNTER — MEDICATION THERAPY MANAGEMENT (OUTPATIENT)
Dept: PHARMACY | Facility: HOSPITAL | Age: 61
End: 2021-08-05

## 2021-08-05 NOTE — PROGRESS NOTES
MTM telephone encounter re adherence and side effects ( Kisqali)    Ms Georges is 2 weeks post hip replacement and is currently not taking Kisqali until after her next appointment with Dr Dixon

## 2021-08-16 RX ORDER — METOPROLOL SUCCINATE 25 MG/1
TABLET, EXTENDED RELEASE ORAL
Qty: 30 TABLET | Refills: 0 | Status: SHIPPED | OUTPATIENT
Start: 2021-08-16 | End: 2021-09-20 | Stop reason: SDUPTHER

## 2021-08-26 ENCOUNTER — OFFICE VISIT (OUTPATIENT)
Dept: ONCOLOGY | Facility: CLINIC | Age: 61
End: 2021-08-26

## 2021-08-26 ENCOUNTER — LAB (OUTPATIENT)
Dept: LAB | Facility: HOSPITAL | Age: 61
End: 2021-08-26

## 2021-08-26 VITALS
BODY MASS INDEX: 26.55 KG/M2 | RESPIRATION RATE: 18 BRPM | DIASTOLIC BLOOD PRESSURE: 94 MMHG | SYSTOLIC BLOOD PRESSURE: 137 MMHG | HEIGHT: 66 IN | HEART RATE: 60 BPM | OXYGEN SATURATION: 98 % | WEIGHT: 165.2 LBS

## 2021-08-26 DIAGNOSIS — M16.12 ARTHRITIS OF LEFT HIP: ICD-10-CM

## 2021-08-26 DIAGNOSIS — I10 ESSENTIAL HYPERTENSION: ICD-10-CM

## 2021-08-26 DIAGNOSIS — C50.812 MALIGNANT NEOPLASM OF OVERLAPPING SITES OF LEFT BREAST IN FEMALE, ESTROGEN RECEPTOR POSITIVE (HCC): Primary | ICD-10-CM

## 2021-08-26 DIAGNOSIS — Z17.0 MALIGNANT NEOPLASM OF OVERLAPPING SITES OF BOTH BREASTS IN FEMALE, ESTROGEN RECEPTOR POSITIVE (HCC): ICD-10-CM

## 2021-08-26 DIAGNOSIS — C50.811 MALIGNANT NEOPLASM OF OVERLAPPING SITES OF BOTH BREASTS IN FEMALE, ESTROGEN RECEPTOR POSITIVE (HCC): ICD-10-CM

## 2021-08-26 DIAGNOSIS — Z17.0 MALIGNANT NEOPLASM OF OVERLAPPING SITES OF LEFT BREAST IN FEMALE, ESTROGEN RECEPTOR POSITIVE (HCC): Primary | ICD-10-CM

## 2021-08-26 DIAGNOSIS — Z17.0 MALIGNANT NEOPLASM OF OVERLAPPING SITES OF LEFT BREAST IN FEMALE, ESTROGEN RECEPTOR POSITIVE (HCC): ICD-10-CM

## 2021-08-26 DIAGNOSIS — C50.812 MALIGNANT NEOPLASM OF OVERLAPPING SITES OF BOTH BREASTS IN FEMALE, ESTROGEN RECEPTOR POSITIVE (HCC): ICD-10-CM

## 2021-08-26 DIAGNOSIS — C50.812 MALIGNANT NEOPLASM OF OVERLAPPING SITES OF LEFT BREAST IN FEMALE, ESTROGEN RECEPTOR POSITIVE (HCC): ICD-10-CM

## 2021-08-26 LAB
ALBUMIN SERPL-MCNC: 4.3 G/DL (ref 3.5–5.2)
ALBUMIN/GLOB SERPL: 1.7 G/DL (ref 1.1–2.4)
ALP SERPL-CCNC: 126 U/L (ref 38–116)
ALT SERPL W P-5'-P-CCNC: 50 U/L (ref 0–33)
ANION GAP SERPL CALCULATED.3IONS-SCNC: 10.8 MMOL/L (ref 5–15)
AST SERPL-CCNC: 42 U/L (ref 0–32)
BASOPHILS # BLD AUTO: 0.06 10*3/MM3 (ref 0–0.2)
BASOPHILS NFR BLD AUTO: 1.2 % (ref 0–1.5)
BILIRUB SERPL-MCNC: 0.3 MG/DL (ref 0.2–1.2)
BUN SERPL-MCNC: 20 MG/DL (ref 6–20)
BUN/CREAT SERPL: 24.1 (ref 7.3–30)
CALCIUM SPEC-SCNC: 9.8 MG/DL (ref 8.5–10.2)
CANCER AG15-3 SERPL-ACNC: 21.3 U/ML
CHLORIDE SERPL-SCNC: 103 MMOL/L (ref 98–107)
CO2 SERPL-SCNC: 26.2 MMOL/L (ref 22–29)
CREAT SERPL-MCNC: 0.83 MG/DL (ref 0.6–1.1)
DEPRECATED RDW RBC AUTO: 48.5 FL (ref 37–54)
EOSINOPHIL # BLD AUTO: 0.23 10*3/MM3 (ref 0–0.4)
EOSINOPHIL NFR BLD AUTO: 4.8 % (ref 0.3–6.2)
ERYTHROCYTE [DISTWIDTH] IN BLOOD BY AUTOMATED COUNT: 13.9 % (ref 12.3–15.4)
GFR SERPL CREATININE-BSD FRML MDRD: 69 ML/MIN/1.73
GLOBULIN UR ELPH-MCNC: 2.5 GM/DL (ref 1.8–3.5)
GLUCOSE SERPL-MCNC: 99 MG/DL (ref 74–124)
HCT VFR BLD AUTO: 37 % (ref 34–46.6)
HGB BLD-MCNC: 11.5 G/DL (ref 12–15.9)
IMM GRANULOCYTES # BLD AUTO: 0.01 10*3/MM3 (ref 0–0.05)
IMM GRANULOCYTES NFR BLD AUTO: 0.2 % (ref 0–0.5)
LYMPHOCYTES # BLD AUTO: 0.69 10*3/MM3 (ref 0.7–3.1)
LYMPHOCYTES NFR BLD AUTO: 14.3 % (ref 19.6–45.3)
MAGNESIUM SERPL-MCNC: 2 MG/DL (ref 1.8–2.5)
MCH RBC QN AUTO: 29.5 PG (ref 26.6–33)
MCHC RBC AUTO-ENTMCNC: 31.1 G/DL (ref 31.5–35.7)
MCV RBC AUTO: 94.9 FL (ref 79–97)
MONOCYTES # BLD AUTO: 0.46 10*3/MM3 (ref 0.1–0.9)
MONOCYTES NFR BLD AUTO: 9.5 % (ref 5–12)
NEUTROPHILS NFR BLD AUTO: 3.39 10*3/MM3 (ref 1.7–7)
NEUTROPHILS NFR BLD AUTO: 70 % (ref 42.7–76)
NRBC BLD AUTO-RTO: 0 /100 WBC (ref 0–0.2)
PLATELET # BLD AUTO: 185 10*3/MM3 (ref 140–450)
PMV BLD AUTO: 8.1 FL (ref 6–12)
POTASSIUM SERPL-SCNC: 4.4 MMOL/L (ref 3.5–4.7)
PROT SERPL-MCNC: 6.8 G/DL (ref 6.3–8)
RBC # BLD AUTO: 3.9 10*6/MM3 (ref 3.77–5.28)
SODIUM SERPL-SCNC: 140 MMOL/L (ref 134–145)
WBC # BLD AUTO: 4.84 10*3/MM3 (ref 3.4–10.8)

## 2021-08-26 PROCEDURE — 99214 OFFICE O/P EST MOD 30 MIN: CPT | Performed by: INTERNAL MEDICINE

## 2021-08-26 PROCEDURE — 80053 COMPREHEN METABOLIC PANEL: CPT

## 2021-08-26 PROCEDURE — 83735 ASSAY OF MAGNESIUM: CPT

## 2021-08-26 PROCEDURE — 85025 COMPLETE CBC W/AUTO DIFF WBC: CPT

## 2021-08-26 PROCEDURE — 86300 IMMUNOASSAY TUMOR CA 15-3: CPT | Performed by: INTERNAL MEDICINE

## 2021-08-26 PROCEDURE — 36415 COLL VENOUS BLD VENIPUNCTURE: CPT

## 2021-08-26 NOTE — PROGRESS NOTES
Subjective     REASON FOR FOLLOW UP:  1. 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 R BREAST MASS WITH GIANT R AXILLARY ADENOPATHY.  3. FAMILY HISTORY OF BREAST CANCER IN MOTHER AND SISTER, SISTER WAS TESTED FOR BRCA AND WAS NEGATIVE.  4. LEFT OVARIAN CYST , IT HAS BEEN PRESENT FOR LONG TIME BUT IS GETTING LARGER, ASYMPTOMATIC, NEED FOR , PELVIC US AND GYN ONC EVEAL                 5. LEFT HIP PAIN SEVERE ARTHRITIS, REAL NEED FOR LEFT HIP REPLACEMENT.    History of Present Illness       DURING THE VISIT WITH THE PATIENT TODAY , PATIENT HAD FACE MASK, MY MEDICAL ASSISTANT AND I  HAD PROPPER PROTECTIVE EQUIPMENT, AND I DID HAND HYGIENE WITH SOAP AND WATER BEFORE AND AFTER THE VISIT.      The patient returns today to the office for follow up in company of her brother. She has had a successful left hip replacement and she has recovered dramatically well from this to the point that she is walking without a cane and going to physical therapy on her own twice a week. She is driving her car. Her appetite is excellent. She has recovered hemoglobin. Her GI tract has minimal diarrhea probably related to magnesium utilization. She has not had any urinary symptomatology. She has not had any surgical infections. She took Xarelto prophylactically and this has been completed already. It has been 6 weeks since the surgery. She is walking with no difficulty and she is already planning to resume her work environment.    She has not resumed her Kisqali yet. She remains on Femara.         Past Medical History:   Diagnosis Date   • Anemia in neoplastic disease    • Arthritis    • Breast cancer (CMS/HCC)     Left   • CVA (cerebral vascular accident) (CMS/HCC)    • Hip pain     RIGHT HIP... CYST   • History of fracture of leg 1987   • History of radiation therapy     LAST TREATMENT     • Hypertension    • Limited joint range of motion (ROM)     RIGHT HIP   • Skin sore     OPEN SORE LEFT BREAST   •  "Syncope    • Vertigo            Past Surgical History:   Procedure Laterality Date   • AXILLARY LYMPH NODE BIOPSY/EXCISION Right     LYMPH NODE UNDER RIGHT ARM-MALIGNANT (DOUBLE MASTECTOMY)   • BREAST BIOPSY Left     MALIGNANT   • FRACTURE SURGERY  1987    Leg   • HARDWARE REMOVAL     • MASTECTOMY W/ SENTINEL NODE BIOPSY Bilateral 9/16/2019    Procedure: BILATERLA MODIFIED RADICAL MASTECTOMY WITH BILATERAL SENTINEL LYMPH NODE BIOPSY;  Surgeon: Joby Barron Jr., MD;  Location: [facility] MAIN OR;  Service: General   • TOTAL HIP ARTHROPLASTY Right 3/2/2020    Procedure: RIGHT TOTAL HIP ARTHROPLASTY NATALY NAVIGATION;  Surgeon: Luis M Leonard MD;  Location: [facility] MAIN OR;  Service: Orthopedics;  Laterality: Right;   • TOTAL HIP ARTHROPLASTY Left 7/20/2021    Procedure: Posterior LEFT TOTAL HIP ARTHROPLASTY NATALY NAVIGATION;  Surgeon: Luis M Leonard MD;  Location: [facility] OR;  Service: Orthopedics;  Laterality: Left;   • VENOUS ACCESS DEVICE (PORT) INSERTION Right 2/1/2019    Procedure: INSERTION VENOUS ACCESS DEVICE;  Surgeon: Joby Barron Jr., MD;  Location: [facility] OSC;  Service: General   • VENOUS ACCESS DEVICE (PORT) REMOVAL N/A 10/30/2019    Procedure: Mediport Removal;  Surgeon: Joby Barron Jr., MD;  Location: [facility] OR;  Service: General        Current Outpatient Medications on File Prior to Visit   Medication Sig Dispense Refill   • acetaminophen (TYLENOL) 650 MG 8 hr tablet Take 1,950 mg by mouth Every 8 (Eight) Hours As Needed.     • APPLE CIDER VINEGAR PO Take 1 tablet/day by mouth Daily.     • Cholecalciferol 250 MCG (27513 UT) tablet Take 1 tablet by mouth. Patient stated dose as \"1500 mg\"     • Chromium 200 MCG tablet Take 1 tablet by mouth. Patient was unable to state dose     • letrozole (FEMARA) 2.5 MG tablet Take 1 tablet by mouth Daily. 90 tablet 2   • loperamide (IMODIUM) 2 MG capsule Take 2 mg by mouth 4 (Four) Times a Day As Needed for Diarrhea.     • magnesium oxide " "(MAG-OX) 400 MG tablet Take 400 mg by mouth 2 (two) times a day. Patient stated dose as \"1500mg\"     • meclizine (ANTIVERT) 25 MG tablet Take 1 tablet by mouth 3 (Three) Times a Day As Needed for Dizziness. 90 tablet 0   • metoprolol succinate XL (TOPROL-XL) 25 MG 24 hr tablet TAKE ONE TABLET BY MOUTH DAILY 30 tablet 0   • NON FORMULARY Take 1 tablet by mouth Daily. TART CHERRY JUICE EXTRACT CAP     • rivaroxaban (XARELTO) 10 MG tablet Take 1 tablet by mouth Daily for 30 days 30 tablet 0     Current Facility-Administered Medications on File Prior to Visit   Medication Dose Route Frequency Provider Last Rate Last Admin   • Chlorhexidine Gluconate Cloth 2 % pads 1 each  1 each Apply externally Take As Directed Luis M Leonard MD            ALLERGIES:    Allergies   Allergen Reactions   • Benadryl [Diphenhydramine] Itching     INCREASES BLOOD PRESSURE   • Erythromycin GI Intolerance   • Levaquin [Levofloxacin] GI Intolerance   • Penicillins GI Intolerance        Social History     Socioeconomic History   • Marital status:      Spouse name: Cruz   • Number of children: 0   • Years of education: Not on file   • Highest education level: Not on file   Tobacco Use   • Smoking status: Never Smoker   • Smokeless tobacco: Never Used   Vaping Use   • Vaping Use: Never used   Substance and Sexual Activity   • Alcohol use: Not Currently     Alcohol/week: 7.0 standard drinks     Types: 4 Glasses of wine, 3 Cans of beer per week     Comment: 2 CUPS DAILY   • Drug use: No   • Sexual activity: Not Currently     Partners: Male     Birth control/protection: Post-menopausal        Family History   Problem Relation Age of Onset   • Lung cancer Father    • Cancer Mother    • Breast cancer Mother    • Liver disease Mother    • Breast cancer Sister 48   • Breast cancer Maternal Grandmother    • Breast cancer Paternal Grandmother    • Breast cancer Maternal Uncle    • Malig Hyperthermia Neg Hx             Objective     Vitals:    " "08/26/21 0820   BP: 137/94   Pulse: 60   Resp: 18   SpO2: 98%   Weight: 74.9 kg (165 lb 3.2 oz)   Height: 167.6 cm (66\")   PainSc: 0-No pain     Current Status 8/26/2021   ECOG score 0     Exam:         I HAVE PERSONALLY REVIEWED THE HISTORY OF THE PRESENT ILLNESS, PAST MEDICAL HISTORY, FAMILY HISTORY, SOCIAL HISTORY, ALLERGIES, MEDICATIONS STATED ABOVE IN THE  NOTE FROM TODAY.        GENERAL:  Well-developed, well-nourished  Patient  in no acute distress.   SKIN:  Warm, dry ,NO rashes,NO purpura ,NO petechiae.  HEENT:  Pupils were equal and reactive to light and accomodation, conjunctivae noninjected, no pterygium, normal extraocular movements, normal visual acuity.   NECK:  Supple with good range of motion; no thyromegaly , no other masses, no JVD or bruits, no cervical adenopathies.No carotid artery pain, no carotid abnormal pulsation , NO arterial dance.  LYMPHATICS:  No cervical, NO supraclavicular, NO axillary,NO epitrochlear , NO inguinal adenopathy.  CARDIAC   normal rate and regular rhythm, without murmur,NO rubs NO S3 NO S4 right or left .  LUNGS: normal breath sounds bilateral, no wheezing, rhonchi, crackles or rubs.CHEST WALL IS NORMAL BILATERAL MASTECTOMIES  VASCULAR VENOUS: no cyanosis, collateral circulation, varicosities, edema, palpable cords, pain, erythema.  ABDOMEN:  Soft, nontender with no hepatomegaly, no splenomegaly,no masses, no ascites, no collateral circulation,no distention,no [place] sign.  EXTREMITIES  AND SPINE:  No clubbing, cyanosis or edema, no deformities , no pain .No kyphosis, scoliosis, no other deformities, no pain in spine, no pain in ribs , no pain inpelvic bone.SURGICAL SITE LEFT BUTTOCK WELL HEALAED  NEUROLOGICAL:  Patient was awake, alert, oriented to time, person and place.          RECENT LABS:  Hematology WBC   Date Value Ref Range Status   08/26/2021 4.84 3.40 - 10.80 10*3/mm3 Final     RBC   Date Value Ref Range Status   08/26/2021 3.90 3.77 - 5.28 10*6/mm3 Final "     Hemoglobin   Date Value Ref Range Status   08/26/2021 11.5 (L) 12.0 - 15.9 g/dL Final     Hematocrit   Date Value Ref Range Status   08/26/2021 37.0 34.0 - 46.6 % Final     Platelets   Date Value Ref Range Status   08/26/2021 185 140 - 450 10*3/mm3 Final                   Assessment/Plan    1.  History of least for the last 4 years, she has had an in crescendo mass in the left breast that has produced a gigantic tumor that WAS close to 25 cm in size and is replacing most of the anatomy of the left breast. There are areas of ulceration necrosis and bleeding and the mass is fixed to the chest wall. She has abundant amount of collateral circulation in the left anterior chest wall and it caught my attention the lack of any left axillary adenopathy. She has no lymphedema in the left upper extremity. In the right breast while in sitting position, no palpable abnormalities are documented. The right breast skin and nipple are normal. When the patient lies flat, there is a palpable mass at 9 o'clock from the nipple that measures probably 4 cm in size, very indistinct in regard to edges and margins. There was no mobility and no areas of tenderness. She has a giant adenopathy in the right axilla that measures close to 9 cm in size, uniform, no fluctuation, nontender, completely fixed to the axillary wall. There is no compromise of the skin.     After completion of 4 cycles of AC patient has had very dramatic improvement in all sites of disease including right axilla and left breast.    · Completed 4 cycles Adriamycin Cytoxan on 4/12/2019.    · Patient started weekly Taxol treatments on 5/3/2019.  · Completed 12 weekly Taxol treatments on 7/19/2019.  · 9/16/2019 bilateral mastectomy by Dr. Joby Barron with axillary lymph node dissection.  No residual malignancy.  · 10/30/2019 patient's Mediport was removed in anticipation of radiation.  · Radiation therapy under the care of Dr. Marmolejo 10/31/2019-1/13/2020 to the right  chest wall.  · Started on adjuvant Femara 2.5 mg daily 8/20/2019.  · 9/21/2020 continues on adjuvant Femara, tolerating it quite well.  Some mild hot flashes and mild arthralgias, all which are tolerable.  I advised the patient that at this point her breast cancer remains in remission. She is 100% compliant of her medication letrozole and her clinical examination remains negative normal for breast cancer recurrence.   The patient was further reviewed on 04/16/2021. Her clinical examination is completely negative normal and her questioning is negative in regard to symptoms that would suggest breast cancer recurrence. She is 100% compliant with her Femara. Her white count, hemoglobin and platelets remain normal. Her tumor marker during the previous visit was normal and we have another one pending today CA 15-3.   The patient was further reviewed on 05/17/2021 along with her brother. The clinical examination has not changed. The only new complaint is the progressive amount of discomfort and the inability to function given the pain in the left hip area. Now she is limping. The only time when she is not in discomfort with the left hip is when she is sitting or lying in bed or riding the horse when gravity takes away the pressure of her left hip area. Radiologically speaking the CT scan of the chest, abdomen and pelvis to my eyes disclosed no abnormalities besides a very simple ovarian cyst that measures close to 7 x 5 cm with no trabeculation. There is no pelvic ascites. There is no pelvic adenopathy. The other abnormality obviously visible is the severe degree of scoliosis of the thoracic, lumbar spines.     It caught my attention the subchondral cyst in the left hip and the minimal if any space leftover between the head of the femur and the acetabulum. There is no metastasis in this anatomical site.     The radiologist mentioned cardiophrenic angle lymphadenopathy. I cannot see that from my naked eyes. I do not know  what it is and I do not know how to express it. Pulmonary anatomy is normal. Hilar adenopathy is normal. No pericardial effusion. No pleural effusion. Minimal infiltrate in the lungs related to radiation changes. Liver anatomy, pancreas and kidneys were unremarkable. The scoliosis is very obvious in the view of the abdomen.     Her CA 15-3 was minimal elevated in comparison to previous assessment and it raises the following question.   1. Is the cardiophrenic node described by the radiologist indeed significant of breast cancer recurrence or not? The only way to know will be to do a PET scan. This will be scheduled.   2. She is known for having an ovarian cyst for a long time but now is getting bigger, 7 cm across, and has no TABICATION with no pelvic ascites. I do not believe that this is representation of malignancy; nevertheless, I am going to run a CA-125 on her and I will proceed with a pelvic ultrasound as well as a GYN oncology referral for review.   3. I will send a notification to Dr. Leonard, the patient’s orthopedic surgeon, in regard to all her issues pertinent to the left hip. I think the patient is getting closer and closer to having a left hip replacement. 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 She recovered extremely well from her right hip surgery before.     The patient returned to the office on 05/24/2021. Since the previous visit the patient proceeded with a PET scan and I have reviewed this with her in the PAC system showing chest wall on the left side area of enlightening SUV activity that is almost 3 cm long x 7 mm wide. It is behind the bone. It is very close to the lower aspect of her heart. The reset of the PET scan discloses no activity. This is also specific in regard to the ovaries and actually an ultrasound of her vagina looking into the ovaries documented an unilocular  cyst on the left side with typical features of benignity and a  was completely normal 5.5.     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 The rest of the PET scan is very much negative normal. I would not be surprised if this is an area of the internal mammary node chain.     Obviously the ovarian cyst is benign only locular with a normal  and I find no reason to refer her to GYN oncology anymore.     Therefore my advice to her is as follows:  1. She will remain on her letrozole 2.5 mg a day.  2. She will initiate Kisqali at the usual dose 3 weeks on 1 week off making her aware of the side effects of the medicine including leukopenia, nausea, vomiting, rash, diarrhea, abnormalities in the liver function test and neutropenic fever. She will take the medicine at least for the next 6 months.  3. The patient will proceed with referral to radiation oncology to treat this area completely.  4. I am going to go ahead and make a referral to Cardiology in preparation for this site of radiation therapy and knowing that tangential fields will be difficult to produce, I think it will be important to have her to be seen by Cardiology in preparation of Kisqali use. Also I advised her that I would like for her to take a magnesium supplement and she needs to eat plenty of nuts to minimize hypomagnesemia that can help her to prolong QT interval that is the most important side effect of Kisqali to my eyes. I advised her to continue her blood pressure medication and I advised her also to have an EKG today and also have a repeated EKG upon return in 3 weeks.    5. The patient will be having formal education and consent for Kisqali and she knows that this medicine will come to her from a speciality pharmacy.   6. The patient will require to have  a repeat PET scan at least 6-8 weeks after completion of her radiation therapy to the chest wall.   7. I advised her and her brother present on the telephone of all of these events and she was ready to proceed.     The patient was further reviewed on 07/07/2021 after she has continued her Kisqali and completion of the 1st round of the medicine. Today her EKG disclosed a normal QT interval and this has been sent to Cardiology to be reported officially. 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 She has completed her radiation therapy to the epicardial right lymph node with no difficulties or side effects.     The thing that is bothering her the most is the pain in the left hip associated with advanced degenerative arthritis and she is limping substantially and having discomfort requiring to take pain medicine, Voltaren, on a regular schedule. She already has been scheduled to have her hip replaced in a few days. In preparation for this I have advised the patient the following.   1. She will discontinue her aspirin and her nonsteroidal anti-inflammatory medications a week in advance for her surgery. This will minimize the potential for bleeding.   2. The patient will hold off on the Kisqali until I review her back in the office in 6 weeks. She will require a blood count done 3 days before the surgery at the same time that she will require her official COVID test before the surgical intervention.   3. The patient will remain on Femara for the time being at 2.5 mg a day. She has no need to stop this medication anytime besides the day of the surgery. She will resume this after the surgery and as soon as she is able to receive oral route.   4. Eventually the patient will require radiological assessment upon review in order to see what happened to the epicardial lymph node.     I would like to review this patient in 6 weeks or so. By then she should have completed her surgery and be able to  function. I know how strong she is and she is planning to rehab in her own house. Her  is coming to see her and to help her during that difficult time.     The patient was further reviewed on 08/26/2021. It has been almost 6 weeks since the left hip replacement therapy for osteoarthritis and she has recovered dramatically well. She is walking with no limp, no cane going to physical therapy on her own and driving the car, having minimal pain, taking Tylenol only and having no other new issues. She is having loose bowel activity though probably related to magnesium supplementation.     The patient is not requiring any further pain medication for her hip replacement therapy. She has not had any infection and she has not had any vein thrombosis.     Her hemoglobin has improved from 9.5 to 11.5 today. She has had a terrific appetite.     The patient has remained on her Femara. We encourage her to remain on Femara and we asked her to go ahead and proceed with her Kisqali at the dose originally planned. She took only 1 tablet of the previous cycle therefore she has the full cycle available for utilization.     The patient was advised to modify the magnesium supplement only 1 tablet a day and to also have magnesium in food like cashews or nuts.    The patient will require from now on monthly visits with CBC, CMP, CA 15-3.    I expect that the patient's hemoglobin will further improve for the next visit.     From the point of view of her hypertension she will remain on her medicine for this by me metoprolol.    From the point of view of her vertigo, she has Antivert available on prn basis.     From the point of view of her previous stroke I advised her to resume her aspirin 81 mg a day.     Her Xarelto has already been discontinued and deleted from the medication list.    I advised her to continue her rehab and be extremely careful in regard her functions around horses to minimize any trauma in the recently operated  left hip.        ·

## 2021-08-27 ENCOUNTER — MEDICATION THERAPY MANAGEMENT (OUTPATIENT)
Dept: PHARMACY | Facility: HOSPITAL | Age: 61
End: 2021-08-27

## 2021-08-27 NOTE — PROGRESS NOTES
[facility] lab review ( Kisqali+Femara)        8/26/2021   WBC 3.40 - 10.80 10*3/mm3 4.84   Neutrophils Absolute 1.70 - 7.00 10*3/mm3 3.39   Hemoglobin 12.0 - 15.9 g/dL 11.5 (A)   Hematocrit 34.0 - 46.6 % 37.0   Platelets 140 - 450 10*3/mm3 185   Creatinine 0.60 - 1.10 mg/dL 0.83   eGFR Non African Am >60 mL/min/1.73 69   BUN 6 - 20 mg/dL 20   Sodium 134 - 145 mmol/L 140   Potassium 3.5 - 4.7 mmol/L 4.4   Glucose 74 - 124 mg/dL 99   Magnesium 1.8 - 2.5 mg/dL 2.0   Calcium 8.5 - 10.2 mg/dL 9.8   Albumin 3.50 - 5.20 g/dL 4.30   Total Protein 6.3 - 8.0 g/dL 6.8   AST (SGOT) 0 - 32 U/L 42 (A)   ALT (SGPT) 0 - 33 U/L 50 (A)   Alkaline Phosphatase 38 - 116 U/L 126 (A)   Total Bilirubin 0.2 - 1.2 mg/dL 0.3       Dr Dixon' dictation is noted.  Restart Kisqali 600 mg po 21/28 days and continue Femara 2.5 mg po daily.  LFTS are noted.

## 2021-09-01 ENCOUNTER — OFFICE VISIT (OUTPATIENT)
Dept: ORTHOPEDIC SURGERY | Facility: CLINIC | Age: 61
End: 2021-09-01

## 2021-09-01 VITALS — TEMPERATURE: 96.9 F | WEIGHT: 163 LBS | HEIGHT: 66 IN | BODY MASS INDEX: 26.2 KG/M2

## 2021-09-01 DIAGNOSIS — Z96.643 STATUS POST TOTAL REPLACEMENT OF BOTH HIPS: ICD-10-CM

## 2021-09-01 DIAGNOSIS — Z96.642 S/P HIP REPLACEMENT, LEFT: Primary | ICD-10-CM

## 2021-09-01 PROCEDURE — 99024 POSTOP FOLLOW-UP VISIT: CPT | Performed by: ORTHOPAEDIC SURGERY

## 2021-09-01 PROCEDURE — 73502 X-RAY EXAM HIP UNI 2-3 VIEWS: CPT | Performed by: ORTHOPAEDIC SURGERY

## 2021-09-01 NOTE — PROGRESS NOTES
Marylu is about 6 weeks status post a left total hip arthroplasty and is doing well.  She says she thinks it took her little longer to get over this than the right but overall she is happy doing well and has good pain relief exam today shows she walks without a limp she is able to transfer well and is doing well.  X-ray AP of the hips lateral left hip taken in the office today for postop follow-up with comparison views show well-positioned bilateral total hip arthroplasties advice given for postop total joints answer questions and to see her back in a couple months with AP lateral bilateral hips  Answers for HPI/ROS submitted by the patient on 8/26/2021  Please describe your symptoms.: Post op  Have you had these symptoms before?: No  How long have you been having these symptoms?: Greater than 2 weeks  Please describe any probable cause for these symptoms. : Surgery  What is the primary reason for your visit?: Other

## 2021-09-10 ENCOUNTER — MEDICATION THERAPY MANAGEMENT (OUTPATIENT)
Dept: PHARMACY | Facility: HOSPITAL | Age: 61
End: 2021-09-10

## 2021-09-10 NOTE — PROGRESS NOTES
MTM telephone encounter re adherence and side effects ( Kisqali+Letrozole)    No answer.  VM direct line [phone].

## 2021-09-17 ENCOUNTER — MEDICATION THERAPY MANAGEMENT (OUTPATIENT)
Dept: PHARMACY | Facility: HOSPITAL | Age: 61
End: 2021-09-17

## 2021-09-17 NOTE — PROGRESS NOTES
MTM Telephone Encounter (adherence/tolerance) - Kisqali + Letrozole    Patient is currently in her one week break from Kisqali after taking a break 2/2 hip surgery and states that she needs refills before she starts her next cycle on 9/22. Her compliance appears to be appropriate with no unplanned missed doses of Kisqali or letrozole. Once she started the Kisqali again, patient endorsed that she experienced some GI symptoms that resolved with a probiotics. She endorses experiencing no other side effects at this time. Patient's appetite is doing well and she continues eating well. She has had no other changes in medications and has no further questions at this time.     Thanks,  Guru Castano  Pharmacy Intern

## 2021-09-20 RX ORDER — LETROZOLE 2.5 MG/1
2.5 TABLET, FILM COATED ORAL DAILY
Qty: 90 TABLET | Refills: 2 | Status: SHIPPED | OUTPATIENT
Start: 2021-09-20 | End: 2021-12-29 | Stop reason: SINTOL

## 2021-09-20 RX ORDER — METOPROLOL SUCCINATE 25 MG/1
25 TABLET, EXTENDED RELEASE ORAL DAILY
Qty: 30 TABLET | Refills: 2 | Status: SHIPPED | OUTPATIENT
Start: 2021-09-20 | End: 2022-01-25 | Stop reason: SDUPTHER

## 2021-09-20 NOTE — TELEPHONE ENCOUNTER
Pt has requested refills on her Letrozole and Metoprolol. Per last office note from Dr Dixon-pt will remain on both medications.    I have routed the rx's to Pamela SANTIAGO NP as Dr Dixon is out of the office. Once signed the refills will be sent to [facility].

## 2021-09-20 NOTE — TELEPHONE ENCOUNTER
Caller: Marylu Georges    Relationship: Self    Best call back number: [phone]    Medication needed:   Requested Prescriptions     Pending Prescriptions Disp Refills   • letrozole (FEMARA) 2.5 MG tablet 90 tablet 2     Sig: Take 1 tablet by mouth Daily.   • metoprolol succinate XL (TOPROL-XL) 25 MG 24 hr tablet 30 tablet 0     Sig: Take 1 tablet by mouth Daily.   SHE ALSO NEEDS KISQALI AND DICLOFENAC REFILLED.    When do you need the refill by: ASAP    What additional details did the patient provide when requesting the medication: PATIENT HAS ABOUT A WEEK LEFT.    Does the patient have less than a 3 day supply:  [] Yes  [x] No    What is the patient's preferred pharmacy:    [address] [facility] [phone] [facility] [phone]

## 2021-09-28 ENCOUNTER — APPOINTMENT (OUTPATIENT)
Dept: LAB | Facility: HOSPITAL | Age: 61
End: 2021-09-28

## 2021-09-29 ENCOUNTER — TELEPHONE (OUTPATIENT)
Dept: PHARMACY | Facility: HOSPITAL | Age: 61
End: 2021-09-29

## 2021-09-29 ENCOUNTER — TELEPHONE (OUTPATIENT)
Dept: ONCOLOGY | Facility: CLINIC | Age: 61
End: 2021-09-29

## 2021-09-29 NOTE — TELEPHONE ENCOUNTER
Called [facility] to see why they wouldn't ship out the Kisqali to pt after receiving phone call from pt stating she was having trouble setting a shipping date.  I found out that they would not bc they closed out the prescription in July bc the pt said she was having surgery. The pt had called recently to reschedule the shipment and the person on the phone expedited the process to the point that all the pt had to do was set up delivery. I called the pt to let her know to set up delivery again.

## 2021-09-29 NOTE — TELEPHONE ENCOUNTER
I called pt to ask her to schedule the delivery of the Kisqali from the [facility]. She said she would. She didn't know that she had to, she is so new to this and last time someone else took care of it for her. Also, she stated that at the beginning of October, according to her ins. [facility] will not be her pharmacy for the specialty drugs any longer.

## 2021-09-30 ENCOUNTER — OFFICE VISIT (OUTPATIENT)
Dept: ONCOLOGY | Facility: CLINIC | Age: 61
End: 2021-09-30

## 2021-09-30 ENCOUNTER — SPECIALTY PHARMACY (OUTPATIENT)
Dept: ONCOLOGY | Facility: HOSPITAL | Age: 61
End: 2021-09-30

## 2021-09-30 ENCOUNTER — TELEPHONE (OUTPATIENT)
Dept: PHARMACY | Facility: HOSPITAL | Age: 61
End: 2021-09-30

## 2021-09-30 ENCOUNTER — LAB (OUTPATIENT)
Dept: LAB | Facility: HOSPITAL | Age: 61
End: 2021-09-30

## 2021-09-30 VITALS
SYSTOLIC BLOOD PRESSURE: 150 MMHG | TEMPERATURE: 97.8 F | DIASTOLIC BLOOD PRESSURE: 89 MMHG | HEART RATE: 58 BPM | OXYGEN SATURATION: 98 % | RESPIRATION RATE: 18 BRPM | HEIGHT: 66 IN | WEIGHT: 163.8 LBS | BODY MASS INDEX: 26.33 KG/M2

## 2021-09-30 DIAGNOSIS — Z17.0 MALIGNANT NEOPLASM OF OVERLAPPING SITES OF LEFT BREAST IN FEMALE, ESTROGEN RECEPTOR POSITIVE (HCC): ICD-10-CM

## 2021-09-30 DIAGNOSIS — C50.812 MALIGNANT NEOPLASM OF OVERLAPPING SITES OF BOTH BREASTS IN FEMALE, ESTROGEN RECEPTOR POSITIVE (HCC): ICD-10-CM

## 2021-09-30 DIAGNOSIS — M16.12 ARTHRITIS OF LEFT HIP: ICD-10-CM

## 2021-09-30 DIAGNOSIS — T50.905A DRUG-INDUCED HEPATITIS: ICD-10-CM

## 2021-09-30 DIAGNOSIS — C50.811 MALIGNANT NEOPLASM OF OVERLAPPING SITES OF BOTH BREASTS IN FEMALE, ESTROGEN RECEPTOR POSITIVE (HCC): ICD-10-CM

## 2021-09-30 DIAGNOSIS — Z17.0 MALIGNANT NEOPLASM OF OVERLAPPING SITES OF BOTH BREASTS IN FEMALE, ESTROGEN RECEPTOR POSITIVE (HCC): ICD-10-CM

## 2021-09-30 DIAGNOSIS — Z17.0 MALIGNANT NEOPLASM OF OVERLAPPING SITES OF BOTH BREASTS IN FEMALE, ESTROGEN RECEPTOR POSITIVE (HCC): Primary | ICD-10-CM

## 2021-09-30 DIAGNOSIS — I10 ESSENTIAL HYPERTENSION: ICD-10-CM

## 2021-09-30 DIAGNOSIS — Z79.899 ENCOUNTER FOR LONG-TERM (CURRENT) USE OF OTHER MEDICATIONS: ICD-10-CM

## 2021-09-30 DIAGNOSIS — C50.812 MALIGNANT NEOPLASM OF OVERLAPPING SITES OF BOTH BREASTS IN FEMALE, ESTROGEN RECEPTOR POSITIVE (HCC): Primary | ICD-10-CM

## 2021-09-30 DIAGNOSIS — C50.811 MALIGNANT NEOPLASM OF OVERLAPPING SITES OF BOTH BREASTS IN FEMALE, ESTROGEN RECEPTOR POSITIVE (HCC): Primary | ICD-10-CM

## 2021-09-30 DIAGNOSIS — K71.6 DRUG-INDUCED HEPATITIS: ICD-10-CM

## 2021-09-30 DIAGNOSIS — C50.812 MALIGNANT NEOPLASM OF OVERLAPPING SITES OF LEFT BREAST IN FEMALE, ESTROGEN RECEPTOR POSITIVE (HCC): ICD-10-CM

## 2021-09-30 LAB
ALBUMIN SERPL-MCNC: 4.5 G/DL (ref 3.5–5.2)
ALBUMIN/GLOB SERPL: 1.9 G/DL (ref 1.1–2.4)
ALP SERPL-CCNC: 112 U/L (ref 38–116)
ALT SERPL W P-5'-P-CCNC: 130 U/L (ref 0–33)
ANION GAP SERPL CALCULATED.3IONS-SCNC: 13.5 MMOL/L (ref 5–15)
AST SERPL-CCNC: 92 U/L (ref 0–32)
BASOPHILS # BLD AUTO: 0.1 10*3/MM3 (ref 0–0.2)
BASOPHILS NFR BLD AUTO: 3.1 % (ref 0–1.5)
BILIRUB SERPL-MCNC: 0.4 MG/DL (ref 0.2–1.2)
BUN SERPL-MCNC: 20 MG/DL (ref 6–20)
BUN/CREAT SERPL: 22 (ref 7.3–30)
CALCIUM SPEC-SCNC: 9.6 MG/DL (ref 8.5–10.2)
CANCER AG15-3 SERPL-ACNC: 24.2 U/ML
CHLORIDE SERPL-SCNC: 104 MMOL/L (ref 98–107)
CO2 SERPL-SCNC: 21.5 MMOL/L (ref 22–29)
CREAT SERPL-MCNC: 0.91 MG/DL (ref 0.6–1.1)
DEPRECATED RDW RBC AUTO: 58.8 FL (ref 37–54)
EOSINOPHIL # BLD AUTO: 0.06 10*3/MM3 (ref 0–0.4)
EOSINOPHIL NFR BLD AUTO: 1.9 % (ref 0.3–6.2)
ERYTHROCYTE [DISTWIDTH] IN BLOOD BY AUTOMATED COUNT: 16.4 % (ref 12.3–15.4)
GFR SERPL CREATININE-BSD FRML MDRD: 62 ML/MIN/1.73
GLOBULIN UR ELPH-MCNC: 2.4 GM/DL (ref 1.8–3.5)
GLUCOSE SERPL-MCNC: 121 MG/DL (ref 74–124)
HAV IGM SERPL QL IA: NORMAL
HBV CORE IGM SERPL QL IA: NORMAL
HBV SURFACE AG SERPL QL IA: NORMAL
HCT VFR BLD AUTO: 37.8 % (ref 34–46.6)
HCV AB SER DONR QL: NORMAL
HGB BLD-MCNC: 11.4 G/DL (ref 12–15.9)
IMM GRANULOCYTES # BLD AUTO: 0.01 10*3/MM3 (ref 0–0.05)
IMM GRANULOCYTES NFR BLD AUTO: 0.3 % (ref 0–0.5)
LYMPHOCYTES # BLD AUTO: 0.64 10*3/MM3 (ref 0.7–3.1)
LYMPHOCYTES NFR BLD AUTO: 19.9 % (ref 19.6–45.3)
MCH RBC QN AUTO: 29.5 PG (ref 26.6–33)
MCHC RBC AUTO-ENTMCNC: 30.2 G/DL (ref 31.5–35.7)
MCV RBC AUTO: 97.9 FL (ref 79–97)
MONOCYTES # BLD AUTO: 0.31 10*3/MM3 (ref 0.1–0.9)
MONOCYTES NFR BLD AUTO: 9.7 % (ref 5–12)
NEUTROPHILS NFR BLD AUTO: 2.09 10*3/MM3 (ref 1.7–7)
NEUTROPHILS NFR BLD AUTO: 65.1 % (ref 42.7–76)
NRBC BLD AUTO-RTO: 0 /100 WBC (ref 0–0.2)
PLATELET # BLD AUTO: 218 10*3/MM3 (ref 140–450)
PMV BLD AUTO: 8.1 FL (ref 6–12)
POTASSIUM SERPL-SCNC: 3.9 MMOL/L (ref 3.5–4.7)
PROT SERPL-MCNC: 6.9 G/DL (ref 6.3–8)
RBC # BLD AUTO: 3.86 10*6/MM3 (ref 3.77–5.28)
SODIUM SERPL-SCNC: 139 MMOL/L (ref 134–145)
WBC # BLD AUTO: 3.21 10*3/MM3 (ref 3.4–10.8)

## 2021-09-30 PROCEDURE — 80074 ACUTE HEPATITIS PANEL: CPT | Performed by: INTERNAL MEDICINE

## 2021-09-30 PROCEDURE — 80053 COMPREHEN METABOLIC PANEL: CPT

## 2021-09-30 PROCEDURE — 86300 IMMUNOASSAY TUMOR CA 15-3: CPT | Performed by: INTERNAL MEDICINE

## 2021-09-30 PROCEDURE — 99215 OFFICE O/P EST HI 40 MIN: CPT | Performed by: INTERNAL MEDICINE

## 2021-09-30 PROCEDURE — 36415 COLL VENOUS BLD VENIPUNCTURE: CPT | Performed by: INTERNAL MEDICINE

## 2021-09-30 PROCEDURE — 85025 COMPLETE CBC W/AUTO DIFF WBC: CPT

## 2021-09-30 NOTE — TELEPHONE ENCOUNTER
Called pt to verify that she received she got her medication. Gave her my # so I would know that she received it.

## 2021-09-30 NOTE — PROGRESS NOTES
MTM in person encounter re adherence and side effects ( Kisqali)      Marylu presents to the clinic today for labs and toxicity check.  She verifies continued adherence to Kisqali 600 mg po 21/28 days.  She is mobile after her second hip replacement, and doing well postoperatively.  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  She reports alternating constipation and diarrhea, but has learned to manage these situations with use of Probiotics ( she recently reduced this to 2 caps daily rather than 4), and dietary changes.  Her insurance has changed pharmacy providers and tomorrow [facility] will be her new specialty.  [email or web address] is sending her a shipment today that should arrive before tomorrow.  Marylu was counseled to call the MTM office at [phone] when she takes the last pill each cycle so that we can call the specialty to remind them of delivery need, as she has had difficulty obtaining timely deliveries on occasion.

## 2021-10-01 ENCOUNTER — TELEPHONE (OUTPATIENT)
Dept: ONCOLOGY | Facility: CLINIC | Age: 61
End: 2021-10-01

## 2021-10-01 ENCOUNTER — MEDICATION THERAPY MANAGEMENT (OUTPATIENT)
Dept: PHARMACY | Facility: HOSPITAL | Age: 61
End: 2021-10-01

## 2021-10-01 NOTE — PROGRESS NOTES
MTM telephone encounter re adherence and side effects ( Kisqali)    See below:      Elie Dixon MD [place]  Ask her to hold any KISQALI, recheck CMP weekly, hepatitis test negative, be sure she is not drinking alcohol, hold cholesterol med if any and be sure she is not taking aleve or nsaid that she has taken before           Previous Messages       ----- Message -----   From: Rosy River RPH   Sent: 9/30/2021   4:06 PM EDT   To: Elie Dixon MD     Wow, her LFTS are climbing.  Let me know if you want me to call her with any hold instructions.       Kori Tay        The above directives were discussed with Marylu and she verbalizes understanding.   In basket message was sent to scheduling and oncology nurse pool re lab appointments and CMP orders.  Marylu reports that she is taking 3 tabs of Tylenol daily, and she was directed to reduce that to minimal possible to maintain mobility.

## 2021-10-01 NOTE — TELEPHONE ENCOUNTER
Caller: Marylu Georges    Relationship: Self    Best call back number: [phone]    What is the best time to reach you: ASAP    Who are you requesting to speak with (clinical staff, provider,  specific staff member):     Do you know the name of the person who called:     What was the call regarding: PT NEEDS TO SCHEDULE LABS FOR UPCOMING WEEKS    Do you require a callback: YES

## 2021-10-06 ENCOUNTER — TELEPHONE (OUTPATIENT)
Dept: ONCOLOGY | Facility: CLINIC | Age: 61
End: 2021-10-06

## 2021-10-06 ENCOUNTER — LAB (OUTPATIENT)
Dept: LAB | Facility: HOSPITAL | Age: 61
End: 2021-10-06

## 2021-10-06 DIAGNOSIS — Z79.899 ENCOUNTER FOR LONG-TERM (CURRENT) USE OF OTHER MEDICATIONS: ICD-10-CM

## 2021-10-06 DIAGNOSIS — Z17.0 MALIGNANT NEOPLASM OF OVERLAPPING SITES OF BOTH BREASTS IN FEMALE, ESTROGEN RECEPTOR POSITIVE (HCC): ICD-10-CM

## 2021-10-06 DIAGNOSIS — Z17.0 MALIGNANT NEOPLASM OF OVERLAPPING SITES OF LEFT BREAST IN FEMALE, ESTROGEN RECEPTOR POSITIVE (HCC): Primary | ICD-10-CM

## 2021-10-06 DIAGNOSIS — I10 ESSENTIAL HYPERTENSION: ICD-10-CM

## 2021-10-06 DIAGNOSIS — C50.812 MALIGNANT NEOPLASM OF OVERLAPPING SITES OF LEFT BREAST IN FEMALE, ESTROGEN RECEPTOR POSITIVE (HCC): Primary | ICD-10-CM

## 2021-10-06 DIAGNOSIS — C50.811 MALIGNANT NEOPLASM OF OVERLAPPING SITES OF BOTH BREASTS IN FEMALE, ESTROGEN RECEPTOR POSITIVE (HCC): ICD-10-CM

## 2021-10-06 DIAGNOSIS — C50.812 MALIGNANT NEOPLASM OF OVERLAPPING SITES OF BOTH BREASTS IN FEMALE, ESTROGEN RECEPTOR POSITIVE (HCC): ICD-10-CM

## 2021-10-06 LAB
ALBUMIN SERPL-MCNC: 5 G/DL (ref 3.5–5.2)
ALBUMIN/GLOB SERPL: 2.3 G/DL (ref 1.1–2.4)
ALP SERPL-CCNC: 111 U/L (ref 38–116)
ALT SERPL W P-5'-P-CCNC: 230 U/L (ref 0–33)
ANION GAP SERPL CALCULATED.3IONS-SCNC: 12.1 MMOL/L (ref 5–15)
AST SERPL-CCNC: 133 U/L (ref 0–32)
BILIRUB SERPL-MCNC: 0.4 MG/DL (ref 0.2–1.2)
BUN SERPL-MCNC: 18 MG/DL (ref 6–20)
BUN/CREAT SERPL: 19.4 (ref 7.3–30)
CALCIUM SPEC-SCNC: 9.7 MG/DL (ref 8.5–10.2)
CANCER AG15-3 SERPL-ACNC: 27.2 U/ML
CHLORIDE SERPL-SCNC: 104 MMOL/L (ref 98–107)
CO2 SERPL-SCNC: 23.9 MMOL/L (ref 22–29)
CREAT SERPL-MCNC: 0.93 MG/DL (ref 0.6–1.1)
GFR SERPL CREATININE-BSD FRML MDRD: 61 ML/MIN/1.73
GGT SERPL-CCNC: 29 U/L (ref 5–36)
GLOBULIN UR ELPH-MCNC: 2.2 GM/DL (ref 1.8–3.5)
GLUCOSE SERPL-MCNC: 89 MG/DL (ref 74–124)
POTASSIUM SERPL-SCNC: 4.1 MMOL/L (ref 3.5–4.7)
PROT SERPL-MCNC: 7.2 G/DL (ref 6.3–8)
SODIUM SERPL-SCNC: 140 MMOL/L (ref 134–145)

## 2021-10-06 PROCEDURE — 80053 COMPREHEN METABOLIC PANEL: CPT

## 2021-10-06 PROCEDURE — 36415 COLL VENOUS BLD VENIPUNCTURE: CPT

## 2021-10-06 PROCEDURE — 82977 ASSAY OF GGT: CPT | Performed by: INTERNAL MEDICINE

## 2021-10-06 PROCEDURE — 86300 IMMUNOASSAY TUMOR CA 15-3: CPT | Performed by: INTERNAL MEDICINE

## 2021-10-06 NOTE — TELEPHONE ENCOUNTER
0 Result Notes  Component   Ref Range & Units 11:13 6 d ago 1 mo ago   Glucose   74 - 124 mg/dL 89  121  99    BUN   6 - 20 mg/dL 18  20  20    Creatinine   0.60 - 1.10 mg/dL 0.93  0.91  0.83    Sodium   134 - 145 mmol/L 140  139  140    Potassium   3.5 - 4.7 mmol/L 4.1  3.9  4.4    Chloride   98 - 107 mmol/L 104  104  103    CO2   22.0 - 29.0 mmol/L 23.9  21.5Low   26.2    Calcium   8.5 - 10.2 mg/dL 9.7  9.6  9.8    Total Protein   6.3 - 8.0 g/dL 7.2  6.9  6.8    Albumin   3.50 - 5.20 g/dL 5.00  4.50  4.30    ALT (SGPT)   0 - 33 U/L 230High Critical   130High Critical   50High     AST (SGOT)   0 - 32 U/L 133High Critical   92High Critical   42High     Alkaline Phosphatase   38 - 116 U/L 111  112  126High     Total Bilirubin   0.2 - 1.2 mg/dL 0.4  0.4  0.3    eGFR Non African Amer   >60 mL/min/1.73 61  62  69    Globulin   1.8 - 3.5 gm/dL 2.2  2.4  2.5    A/G Ratio   1.1 - 2.4 g/dL 2.3  1.9  1.7    BUN/Creatinine Ratio   7.3 - 30.0 19.4  22.0  24.1    Anion Gap   5.0 - 15.0 mmol/L 12.1  13.5             We have called this patient today to be sure that she is not drinking any alcohol, taking excessive amount of Tylenol or anti-inflammatory medication and be sure that she discontinued other supplements that she has been taking before. She is not drinking any alcohol. She is not taking truckloads of any anti-inflammatories, just Tylenol twice a day. We are very concerned because in absence of taking any Kisqali now for more than 2 weeks her liver function tests remain very abnormal and progressively worse. We did hepatitis A, B and C serologies last week that were negative. I do believe that this needs to be addressed not only stopping many of the supplements that she is using including the Tylenol as well and we are completely sure that she is not taking the Kisqali. She will proceed with radiological assessment of her liver. Now that we are going to review this we will do a radiological assessment of her  metastatic site in the chest as well. This will be discussed with her on the telephone once that the reports become available in the next day or so.

## 2021-10-06 NOTE — TELEPHONE ENCOUNTER
Dr. Dixon wanted me to call the patient as he is very worried about her liver enzyme levels. I called her and asked about alcohol consumption and she stated there was none. I then asked about her taking tylenol and she stated she has cut back her 3 650 mg doses daily to 2. I educated her to stop the tylenol at this time due to her levels and the liver damage tylenol can cause. I then asked about apple cider vinegar and she stated she was taking this. I let her know Dr. Dixon wanted her to stop.  She also stated she was taking the Chromium and I let her Dr. Dixon wanted that stopped as well. She has not been taking the cherry extract or Kisquali. I let her know Dr. Dixon stated that he wanted to get an abdominal scan done on her tomorrow most likely. I went back and relayed this information to him. He ordered a CT of the chest and abdomen. Orders have been placed and [facility] Patient was called and notified of this. Patient v/u.

## 2021-10-07 ENCOUNTER — HOSPITAL ENCOUNTER (OUTPATIENT)
Dept: PET IMAGING | Facility: HOSPITAL | Age: 61
Discharge: HOME | End: 2021-10-07
Admitting: INTERNAL MEDICINE

## 2021-10-07 ENCOUNTER — MEDICATION THERAPY MANAGEMENT (OUTPATIENT)
Dept: PHARMACY | Facility: HOSPITAL | Age: 61
End: 2021-10-07

## 2021-10-07 DIAGNOSIS — Z17.0 MALIGNANT NEOPLASM OF OVERLAPPING SITES OF LEFT BREAST IN FEMALE, ESTROGEN RECEPTOR POSITIVE (HCC): ICD-10-CM

## 2021-10-07 DIAGNOSIS — C50.812 MALIGNANT NEOPLASM OF OVERLAPPING SITES OF LEFT BREAST IN FEMALE, ESTROGEN RECEPTOR POSITIVE (HCC): ICD-10-CM

## 2021-10-07 LAB — CREAT BLDA-MCNC: 1.1 MG/DL (ref 0.6–1.3)

## 2021-10-07 PROCEDURE — 25010000002 IOPAMIDOL 61 % SOLUTION: Performed by: INTERNAL MEDICINE

## 2021-10-07 PROCEDURE — 82565 ASSAY OF CREATININE: CPT

## 2021-10-07 PROCEDURE — 0 DIATRIZOATE MEGLUMINE & SODIUM PER 1 ML: Performed by: INTERNAL MEDICINE

## 2021-10-07 PROCEDURE — 74160 CT ABDOMEN W/CONTRAST: CPT

## 2021-10-07 PROCEDURE — 71260 CT THORAX DX C+: CPT

## 2021-10-07 RX ADMIN — IOPAMIDOL 85 ML: 612 INJECTION, SOLUTION INTRAVENOUS at 14:06

## 2021-10-07 RX ADMIN — DIATRIZOATE MEGLUMINE AND DIATRIZOATE SODIUM 30 ML: 660; 100 LIQUID ORAL; RECTAL at 13:25

## 2021-10-07 NOTE — PROGRESS NOTES
[facility] lab review ( Kisqali)        10/6/2021   Creatinine 0.60 - 1.10 mg/dL 0.93   eGFR Non African Am >60 mL/min/1.73 61   BUN 6 - 20 mg/dL 18   Sodium 134 - 145 mmol/L 140   Potassium 3.5 - 4.7 mmol/L 4.1   Glucose 74 - 124 mg/dL 89   Calcium 8.5 - 10.2 mg/dL 9.7   Albumin 3.50 - 5.20 g/dL 5.00   Total Protein 6.3 - 8.0 g/dL 7.2   AST (SGOT) 0 - 32 U/L 133 (A)   ALT (SGPT) 0 - 33 U/L 230 (A)   Alkaline Phosphatase 38 - 116 U/L 111   Total Bilirubin 0.2 - 1.2 mg/dL 0.4     Dr Dixon has discussed with patient.  Kisqali remains on hold.

## 2021-10-08 ENCOUNTER — TELEPHONE (OUTPATIENT)
Dept: ONCOLOGY | Facility: CLINIC | Age: 61
End: 2021-10-08

## 2021-10-08 NOTE — TELEPHONE ENCOUNTER
CT CHEST AND ABDOMEN WITH IV CONTRAST     HISTORY: 61-year-old female with metastatic breast cancer. Bilateral  mastectomies. Follow-up. Evaluate for metastatic disease.     TECHNIQUE: Radiation dose reduction techniques were utilized, including  automated exposure control and exposure modulation based on body size.   3 mm images were obtained through the chest and abdomen after the  administration of IV contrast. Compared with PET/CT 05/19/2021 and  previous CTs 05/10/2021.     FINDINGS: There is no interval change in a few sub-6 mm nodular  opacities and a very small peripheral ground-glass opacity at the left  upper lobe is stable. A focus of pleural thickening at the right major  fissure is stable. There are no new or suspicious opacities and there  are no pleural or pericardial effusions. There is no lymphadenopathy  within the chest. There has been near complete resolution of the  hypermetabolic epicardial fat pad node which is mostly linear in  appearance measuring 1.0 x 0.4 cm series 2 image 70. There is no  lymphadenopathy at the axilla or subpectoral regions. There is no change  in the appearance of the anterior chest wall bilaterally. The liver,  gallbladder, adrenals, spleen, pancreas, and kidneys appear  unremarkable. Visualized bowel appears unremarkable. There is a very  small umbilical hernia containing fat. No suspicious bone lesion is  seen.     IMPRESSION:  1. Near-complete resolution of the hypermetabolic epicardial fat pad  node. A few tiny nodular opacities are stable and there is no new  abnormality.  2. There is no evidence for metastatic disease within the abdomen.     This report was finalized on 10/8/2021 3:59 PM by Dr. Sarah Magaña M.D.           Specimen Collected: 10/08/21 12:06 Last Resulted: 10/08/21 15        Because this patient has continued having significant alterations in her liver function test in spite of stopping her Kisqali and stopping the rest of the medicines that she was  taking including supplements and being sure that she is not drinking alcohol or taking excessive amount of Tylenol or anti-inflammatory medications and this was discussed by nurses with her yesterday, we advised the patient to proceed with radiological assessment of her abdomen and chest today in order to know the status of her breast cancer. The good news is that the tumoral lesion close to the heart on the right side has substantially improved in size. There are no new lesions. There are no pericardial effusions, pleural effusions or bone lesions. Liver anatomy is normal. There is no biliary duct dilatation. There is no alteration in the pancreas per se. The kidney anatomy is normal. The spleen is normal.     On further inquiry on the patient in regard to chronic liver disease in the family and she popped up the answers in regard that her mother probably had primary biliary cirrhosis and she had 2 liver transplants. I think we are going to need to go into next week’s laboratory testing in this patient with a different agenda on doing analysis for autoimmune hepatitis, biliary cirrhosis and so forth and I would not be surprised if the patient needs to be seen by the GI team very soon. In the meantime I advised her to remain off most of the medicines with the exception of her aspirin, her blood pressure medicine and her Femara. There are no reports of Femara producing liver dysfunction like this. Never seen it. Doubt that that would be the case. Now with the information that she has given to me, I asked her to inquiry to her brother who is also in the health situation who has deep knowledge about family issues.

## 2021-10-13 ENCOUNTER — LAB (OUTPATIENT)
Dept: LAB | Facility: HOSPITAL | Age: 61
End: 2021-10-13

## 2021-10-13 ENCOUNTER — TELEPHONE (OUTPATIENT)
Dept: ONCOLOGY | Facility: CLINIC | Age: 61
End: 2021-10-13

## 2021-10-13 DIAGNOSIS — C50.812 MALIGNANT NEOPLASM OF OVERLAPPING SITES OF LEFT BREAST IN FEMALE, ESTROGEN RECEPTOR POSITIVE (HCC): Primary | ICD-10-CM

## 2021-10-13 DIAGNOSIS — Z17.0 MALIGNANT NEOPLASM OF OVERLAPPING SITES OF BOTH BREASTS IN FEMALE, ESTROGEN RECEPTOR POSITIVE (HCC): ICD-10-CM

## 2021-10-13 DIAGNOSIS — I10 ESSENTIAL HYPERTENSION: ICD-10-CM

## 2021-10-13 DIAGNOSIS — Z17.0 MALIGNANT NEOPLASM OF OVERLAPPING SITES OF LEFT BREAST IN FEMALE, ESTROGEN RECEPTOR POSITIVE (HCC): Primary | ICD-10-CM

## 2021-10-13 DIAGNOSIS — C50.812 MALIGNANT NEOPLASM OF OVERLAPPING SITES OF BOTH BREASTS IN FEMALE, ESTROGEN RECEPTOR POSITIVE (HCC): ICD-10-CM

## 2021-10-13 DIAGNOSIS — Z79.899 ENCOUNTER FOR LONG-TERM (CURRENT) USE OF OTHER MEDICATIONS: ICD-10-CM

## 2021-10-13 DIAGNOSIS — C50.811 MALIGNANT NEOPLASM OF OVERLAPPING SITES OF BOTH BREASTS IN FEMALE, ESTROGEN RECEPTOR POSITIVE (HCC): ICD-10-CM

## 2021-10-13 DIAGNOSIS — R74.8 ELEVATED LIVER ENZYMES: ICD-10-CM

## 2021-10-13 LAB
ALBUMIN SERPL-MCNC: 4.7 G/DL (ref 3.5–5.2)
ALBUMIN/GLOB SERPL: 2.2 G/DL (ref 1.1–2.4)
ALP SERPL-CCNC: 105 U/L (ref 38–116)
ALT SERPL W P-5'-P-CCNC: 349 U/L (ref 0–33)
ANION GAP SERPL CALCULATED.3IONS-SCNC: 9.5 MMOL/L (ref 5–15)
AST SERPL-CCNC: 184 U/L (ref 0–32)
BASOPHILS # BLD AUTO: 0.05 10*3/MM3 (ref 0–0.2)
BASOPHILS NFR BLD AUTO: 1.2 % (ref 0–1.5)
BILIRUB SERPL-MCNC: 0.3 MG/DL (ref 0.2–1.2)
BUN SERPL-MCNC: 22 MG/DL (ref 6–20)
BUN/CREAT SERPL: 23.2 (ref 7.3–30)
CALCIUM SPEC-SCNC: 9.6 MG/DL (ref 8.5–10.2)
CHLORIDE SERPL-SCNC: 105 MMOL/L (ref 98–107)
CO2 SERPL-SCNC: 25.5 MMOL/L (ref 22–29)
CREAT SERPL-MCNC: 0.95 MG/DL (ref 0.6–1.1)
DEPRECATED RDW RBC AUTO: 58.3 FL (ref 37–54)
EOSINOPHIL # BLD AUTO: 0.08 10*3/MM3 (ref 0–0.4)
EOSINOPHIL NFR BLD AUTO: 1.9 % (ref 0.3–6.2)
ERYTHROCYTE [DISTWIDTH] IN BLOOD BY AUTOMATED COUNT: 16 % (ref 12.3–15.4)
GFR SERPL CREATININE-BSD FRML MDRD: 59 ML/MIN/1.73
GLOBULIN UR ELPH-MCNC: 2.1 GM/DL (ref 1.8–3.5)
GLUCOSE SERPL-MCNC: 93 MG/DL (ref 74–124)
HCT VFR BLD AUTO: 40.6 % (ref 34–46.6)
HGB BLD-MCNC: 12.2 G/DL (ref 12–15.9)
IMM GRANULOCYTES # BLD AUTO: 0.01 10*3/MM3 (ref 0–0.05)
IMM GRANULOCYTES NFR BLD AUTO: 0.2 % (ref 0–0.5)
LYMPHOCYTES # BLD AUTO: 0.7 10*3/MM3 (ref 0.7–3.1)
LYMPHOCYTES NFR BLD AUTO: 16.8 % (ref 19.6–45.3)
MAGNESIUM SERPL-MCNC: 1.9 MG/DL (ref 1.8–2.5)
MCH RBC QN AUTO: 29.3 PG (ref 26.6–33)
MCHC RBC AUTO-ENTMCNC: 30 G/DL (ref 31.5–35.7)
MCV RBC AUTO: 97.4 FL (ref 79–97)
MONOCYTES # BLD AUTO: 0.43 10*3/MM3 (ref 0.1–0.9)
MONOCYTES NFR BLD AUTO: 10.3 % (ref 5–12)
NEUTROPHILS NFR BLD AUTO: 2.89 10*3/MM3 (ref 1.7–7)
NEUTROPHILS NFR BLD AUTO: 69.6 % (ref 42.7–76)
NRBC BLD AUTO-RTO: 0 /100 WBC (ref 0–0.2)
PLATELET # BLD AUTO: 214 10*3/MM3 (ref 140–450)
PMV BLD AUTO: 8.5 FL (ref 6–12)
POTASSIUM SERPL-SCNC: 4.2 MMOL/L (ref 3.5–4.7)
PROT SERPL-MCNC: 6.8 G/DL (ref 6.3–8)
RBC # BLD AUTO: 4.17 10*6/MM3 (ref 3.77–5.28)
SODIUM SERPL-SCNC: 140 MMOL/L (ref 134–145)
WBC # BLD AUTO: 4.16 10*3/MM3 (ref 3.4–10.8)

## 2021-10-13 PROCEDURE — 36415 COLL VENOUS BLD VENIPUNCTURE: CPT

## 2021-10-13 PROCEDURE — 80053 COMPREHEN METABOLIC PANEL: CPT

## 2021-10-13 PROCEDURE — 83735 ASSAY OF MAGNESIUM: CPT

## 2021-10-13 PROCEDURE — 85025 COMPLETE CBC W/AUTO DIFF WBC: CPT

## 2021-10-13 NOTE — TELEPHONE ENCOUNTER
Called the patient per Dr. Dixon as he liver enzymes are higher then they were last week. Patient stated that her brother let her know her mother had primary biliary cirrhosis. I let her know that dr. Dixon had additional labs he wanted done and he also wanted her to see GI. I verified that she did not have a GI doctor and she explained that she did not. I told her I would let Zack know about the mothers diagnosis and she stated she could come in tomorrow at 11am for labs. Patient livia/jesus Caitlyn was notified.

## 2021-10-14 ENCOUNTER — LAB (OUTPATIENT)
Dept: LAB | Facility: HOSPITAL | Age: 61
End: 2021-10-14

## 2021-10-14 ENCOUNTER — MEDICATION THERAPY MANAGEMENT (OUTPATIENT)
Dept: PHARMACY | Facility: HOSPITAL | Age: 61
End: 2021-10-14

## 2021-10-14 DIAGNOSIS — R74.8 ELEVATED LIVER ENZYMES: ICD-10-CM

## 2021-10-14 LAB
ALPHA1 GLOB MFR UR ELPH: 144 MG/DL (ref 90–200)
CERULOPLASMIN SERPL-MCNC: 25 MG/DL (ref 19–39)
FERRITIN SERPL-MCNC: 308.1 NG/ML (ref 13–150)
GGT SERPL-CCNC: 34 U/L (ref 5–36)
IRON 24H UR-MRATE: 56 MCG/DL (ref 37–145)
IRON SATN MFR SERPL: 16 % (ref 14–48)
TIBC SERPL-MCNC: 347 MCG/DL (ref 249–505)
TRANSFERRIN SERPL-MCNC: 248 MG/DL (ref 200–360)

## 2021-10-14 PROCEDURE — 82390 ASSAY OF CERULOPLASMIN: CPT | Performed by: INTERNAL MEDICINE

## 2021-10-14 PROCEDURE — 82977 ASSAY OF GGT: CPT | Performed by: INTERNAL MEDICINE

## 2021-10-14 PROCEDURE — 84466 ASSAY OF TRANSFERRIN: CPT

## 2021-10-14 PROCEDURE — 82103 ALPHA-1-ANTITRYPSIN TOTAL: CPT | Performed by: INTERNAL MEDICINE

## 2021-10-14 PROCEDURE — 83540 ASSAY OF IRON: CPT

## 2021-10-14 PROCEDURE — 82728 ASSAY OF FERRITIN: CPT

## 2021-10-14 NOTE — PROGRESS NOTES
[facility] lab review ( Kisqali)        10/13/2021   WBC 3.40 - 10.80 10*3/mm3 4.16   Neutrophils Absolute 1.70 - 7.00 10*3/mm3 2.89   Hemoglobin 12.0 - 15.9 g/dL 12.2   Hematocrit 34.0 - 46.6 % 40.6   Platelets 140 - 450 10*3/mm3 214   Creatinine 0.60 - 1.10 mg/dL 0.95   eGFR Non African Am >60 mL/min/1.73 59 (A)   BUN 6 - 20 mg/dL 22 (A)   Sodium 134 - 145 mmol/L 140   Potassium 3.5 - 4.7 mmol/L 4.2   Glucose 74 - 124 mg/dL 93   Magnesium 1.8 - 2.5 mg/dL 1.9   Calcium 8.5 - 10.2 mg/dL 9.6   Albumin 3.50 - 5.20 g/dL 4.70   Total Protein 6.3 - 8.0 g/dL 6.8   AST (SGOT) 0 - 32 U/L 184 (A)   ALT (SGPT) 0 - 33 U/L 349 (A)   Alkaline Phosphatase 38 - 116 U/L 105   Total Bilirubin 0.2 - 1.2 mg/dL 0.3     Kisqali remains on hold.  GI consult noted.

## 2021-10-15 LAB
ACTIN IGG SERPL-ACNC: 2 UNITS (ref 0–19)
CENTROMERE B AB SER-ACNC: <0.2 AI (ref 0–0.9)
CHROMATIN AB SERPL-ACNC: <0.2 AI (ref 0–0.9)
DSDNA AB SER-ACNC: 1 IU/ML (ref 0–9)
ENA JO1 AB SER-ACNC: <0.2 AI (ref 0–0.9)
ENA RNP AB SER-ACNC: <0.2 AI (ref 0–0.9)
ENA SCL70 AB SER-ACNC: <0.2 AI (ref 0–0.9)
ENA SM AB SER-ACNC: <0.2 AI (ref 0–0.9)
ENA SS-A AB SER-ACNC: <0.2 AI (ref 0–0.9)
ENA SS-B AB SER-ACNC: <0.2 AI (ref 0–0.9)
ENDOMYSIUM IGA SER QL: NEGATIVE
GLIADIN PEPTIDE IGA SER-ACNC: 3 UNITS (ref 0–19)
GLIADIN PEPTIDE IGG SER-ACNC: 2 UNITS (ref 0–19)
IGA SERPL-MCNC: 97 MG/DL (ref 87–352)
LKM-1 AB SER-ACNC: 1.4 UNITS (ref 0–20)
Lab: NORMAL
TTG IGA SER-ACNC: <2 U/ML (ref 0–3)
TTG IGG SER-ACNC: <2 U/ML (ref 0–5)

## 2021-10-19 LAB — COPPER SERPL-MCNC: 147 UG/DL (ref 80–158)

## 2021-10-20 ENCOUNTER — LAB (OUTPATIENT)
Dept: LAB | Facility: HOSPITAL | Age: 61
End: 2021-10-20

## 2021-10-20 DIAGNOSIS — Z79.899 ENCOUNTER FOR LONG-TERM (CURRENT) USE OF OTHER MEDICATIONS: ICD-10-CM

## 2021-10-20 DIAGNOSIS — C50.811 MALIGNANT NEOPLASM OF OVERLAPPING SITES OF BOTH BREASTS IN FEMALE, ESTROGEN RECEPTOR POSITIVE (HCC): ICD-10-CM

## 2021-10-20 DIAGNOSIS — Z17.0 MALIGNANT NEOPLASM OF OVERLAPPING SITES OF BOTH BREASTS IN FEMALE, ESTROGEN RECEPTOR POSITIVE (HCC): ICD-10-CM

## 2021-10-20 DIAGNOSIS — C50.812 MALIGNANT NEOPLASM OF OVERLAPPING SITES OF BOTH BREASTS IN FEMALE, ESTROGEN RECEPTOR POSITIVE (HCC): ICD-10-CM

## 2021-10-20 LAB
ALBUMIN SERPL-MCNC: 4.5 G/DL (ref 3.5–5.2)
ALBUMIN/GLOB SERPL: 2 G/DL (ref 1.1–2.4)
ALP SERPL-CCNC: 115 U/L (ref 38–116)
ALT SERPL W P-5'-P-CCNC: 421 U/L (ref 0–33)
ANION GAP SERPL CALCULATED.3IONS-SCNC: 11.1 MMOL/L (ref 5–15)
AST SERPL-CCNC: 201 U/L (ref 0–32)
BILIRUB SERPL-MCNC: 0.3 MG/DL (ref 0.2–1.2)
BUN SERPL-MCNC: 24 MG/DL (ref 6–20)
BUN/CREAT SERPL: 27.3 (ref 7.3–30)
CALCIUM SPEC-SCNC: 9.5 MG/DL (ref 8.5–10.2)
CHLORIDE SERPL-SCNC: 105 MMOL/L (ref 98–107)
CO2 SERPL-SCNC: 21.9 MMOL/L (ref 22–29)
CREAT SERPL-MCNC: 0.88 MG/DL (ref 0.6–1.1)
GFR SERPL CREATININE-BSD FRML MDRD: 65 ML/MIN/1.73
GLOBULIN UR ELPH-MCNC: 2.2 GM/DL (ref 1.8–3.5)
GLUCOSE SERPL-MCNC: 126 MG/DL (ref 74–124)
POTASSIUM SERPL-SCNC: 3.9 MMOL/L (ref 3.5–4.7)
PROT SERPL-MCNC: 6.7 G/DL (ref 6.3–8)
SODIUM SERPL-SCNC: 138 MMOL/L (ref 134–145)

## 2021-10-20 PROCEDURE — 80053 COMPREHEN METABOLIC PANEL: CPT

## 2021-10-20 PROCEDURE — 36415 COLL VENOUS BLD VENIPUNCTURE: CPT

## 2021-10-21 ENCOUNTER — TELEPHONE (OUTPATIENT)
Dept: ONCOLOGY | Facility: CLINIC | Age: 61
End: 2021-10-21

## 2021-10-21 ENCOUNTER — TELEPHONE (OUTPATIENT)
Dept: GASTROENTEROLOGY | Facility: CLINIC | Age: 61
End: 2021-10-21

## 2021-10-21 DIAGNOSIS — R74.8 ELEVATED LIVER ENZYMES: Primary | ICD-10-CM

## 2021-10-21 NOTE — TELEPHONE ENCOUNTER
----- Message from Ayala Lou sent at 10/21/2021  1:45 PM EDT -----  Regarding: medical question  Contact: [phone]  Dr britton office calling wanting to know if drs are aware of pts condition / lab results states labs are bad and pt is not scheduled out until dec would like a call erickson galdamez spoke with ramos [phone]

## 2021-10-21 NOTE — TELEPHONE ENCOUNTER
Per [facility] pt need earlier appt.  Appt scheduled with Dr. Kirkpatrick 10/26 @ 1500.  Called pt and advised, pt verb understanding.  Notified Caitlyn at Dr. Dixon office.

## 2021-10-21 NOTE — TELEPHONE ENCOUNTER
0 Result Notes    Component   Ref Range & Units 1 d ago   (10/20/21) 8 d ago   (10/13/21) 2 wk ago   (10/7/21) 2 wk ago   (10/6/21) 3 wk ago   (9/30/21) 1 mo ago   (8/26/21) 3 mo ago   (7/16/21)   Glucose   74 - 124 mg/dL 126 High   93   89  121  99  97    BUN   6 - 20 mg/dL 24 High   22 High    18  20  20  31 High     Creatinine   0.60 - 1.10 mg/dL 0.88  0.95  1.10 R, CM  0.93  0.91  0.83  0.93    Sodium   134 - 145 mmol/L 138  140   140  139  140  138    Potassium   3.5 - 4.7 mmol/L 3.9  4.2   4.1  3.9  4.4  4.6    Chloride   98 - 107 mmol/L 105  105   104  104  103  103    CO2   22.0 - 29.0 mmol/L 21.9 Low   25.5   23.9  21.5 Low   26.2  25.3    Calcium   8.5 - 10.2 mg/dL 9.5  9.6   9.7  9.6  9.8  9.5    Total Protein   6.3 - 8.0 g/dL 6.7  6.8   7.2  6.9  6.8  6.9    Albumin   3.50 - 5.20 g/dL 4.50  4.70   5.00  4.50  4.30  4.30    ALT (SGPT)   0 - 33 U/L 421 High Critical   349 High Critical    230 High Critical   130 High Critical   50 High   32    AST (SGOT)   0 - 32 U/L 201 High Critical   184 High Critical    133 High Critical   92 High Critical   42 High   29    Alkaline Phosphatase   38 - 116 U/L 115  105   111  112  126 High   110    Total Bilirubin   0.2 - 1.2 mg/dL 0.3  0.3   0.4  0.4  0.3  0.3    eGFR Non African Amer   >60 mL/min/1.73 65  59 Low    61  62  69          I have called this patient today to inform her that her liver test abnormality remains ongoing and is worse every week. Under the circumstances, I feel the obligation for this patient to proceed with gastrointestinal evaluation and I have discussed the case with Dr. Manjeet Coto. Because the appointment is not going to happen immediately, I would like for this patient to go ahead and proceed with liver biopsy in anticipation of GI assessment. The rest of the laboratory testing that we have done on this patient recently to figure out why this phenomenon is happening does not provide too much light regarding what the situation is. I  advised the patient to discontinue the aspirin in preparation for liver biopsy. She will have a suspected pro-time test and PTT in anticipation of this, and then we will review her back in a few days in order to put it all together and discuss the case further with Dr. Manjeet Coto.

## 2021-10-26 ENCOUNTER — OFFICE VISIT (OUTPATIENT)
Dept: GASTROENTEROLOGY | Facility: CLINIC | Age: 61
End: 2021-10-26

## 2021-10-26 VITALS — BODY MASS INDEX: 25.33 KG/M2 | WEIGHT: 157.6 LBS | HEIGHT: 66 IN | TEMPERATURE: 97.8 F

## 2021-10-26 DIAGNOSIS — C50.919 METASTATIC BREAST CANCER: ICD-10-CM

## 2021-10-26 DIAGNOSIS — R79.89 ELEVATED LFTS: Primary | ICD-10-CM

## 2021-10-26 PROCEDURE — 99203 OFFICE O/P NEW LOW 30 MIN: CPT | Performed by: INTERNAL MEDICINE

## 2021-10-26 RX ORDER — DICLOFENAC SODIUM 75 MG/1
75 TABLET, DELAYED RELEASE ORAL 2 TIMES DAILY
COMMUNITY
End: 2021-11-02

## 2021-10-26 NOTE — PROGRESS NOTES
Chief Complaint   Patient presents with   • ABN LFT's        Marylu Georges is a  61 y.o. female here for an initial visit for progressive elevation in liver function studies, history of metastatic breast cancer    HPI this 61-year-old white female patient of Dr. Elie Dixon presents with a history of elevated liver function studies starting back in July of this year. She had been treated with chemo therapeutic agents starting in June and had to stop the medication so that she could undergo a hip surgery in August. I believe she had resumed the medication until September but then this was discontinued because of concern that it might have an influence on her progressive elevation in liver function studies. She has had extensive laboratory work looking for causative factors with negative results to date. No prior history of liver disease although mother had primary biliary cirrhosis. Her antimitochondrial antibody test was normal. CT scan done in October was negative for any metastatic foci. Her liver biopsy is to be performed in the near future.    Past Medical History:   Diagnosis Date   • Anemia in neoplastic disease    • Arthritis    • Breast cancer (HCC)     Left   • CVA (cerebral vascular accident) (HCC)    • Hip pain     RIGHT HIP... CYST   • History of fracture of leg 1987   • History of radiation therapy     LAST TREATMENT     • Hypertension    • Limited joint range of motion (ROM)     RIGHT HIP   • Skin sore     OPEN SORE LEFT BREAST   • Syncope    • Vertigo        Current Outpatient Medications   Medication Sig Dispense Refill   • diclofenac (VOLTAREN) 75 MG EC tablet Take 75 mg by mouth 2 (Two) Times a Day.     • letrozole (FEMARA) 2.5 MG tablet Take 1 tablet by mouth Daily. 90 tablet 2   • metoprolol succinate XL (TOPROL-XL) 25 MG 24 hr tablet Take 1 tablet by mouth Daily. 30 tablet 2   • acetaminophen (TYLENOL) 650 MG 8 hr tablet Take 1,950 mg by mouth Every 8 (Eight) Hours As Needed.     •  "APPLE CIDER VINEGAR PO Take 1 tablet/day by mouth Daily.     • Cholecalciferol 250 MCG (54074 UT) tablet Take 1 tablet by mouth. Patient stated dose as \"1500 mg\"     • Chromium 200 MCG tablet Take 1 tablet by mouth. Patient was unable to state dose     • loperamide (IMODIUM) 2 MG capsule Take 2 mg by mouth 4 (Four) Times a Day As Needed for Diarrhea.     • magnesium oxide (MAG-OX) 400 MG tablet Take 400 mg by mouth 2 (two) times a day. Patient stated dose as \"1500mg\"     • meclizine (ANTIVERT) 25 MG tablet Take 1 tablet by mouth 3 (Three) Times a Day As Needed for Dizziness. 90 tablet 0   • NON FORMULARY Take 1 tablet by mouth Daily. TART CHERRY JUICE EXTRACT CAP     • ribociclib succinate (Kisqali, 600 MG Dose,) 200 MG tablet therapy pack tablet Take 600 mg by mouth Daily. Take for 21 days and then off 7 days       No current facility-administered medications for this visit.     Facility-Administered Medications Ordered in Other Visits   Medication Dose Route Frequency Provider Last Rate Last Admin   • Chlorhexidine Gluconate Cloth 2 % pads 1 each  1 each Apply externally Take As Directed Luis M Leonard MD           PRN Meds:.    Allergies   Allergen Reactions   • Benadryl [Diphenhydramine] Itching     INCREASES BLOOD PRESSURE   • Erythromycin GI Intolerance   • Levaquin [Levofloxacin] GI Intolerance   • Penicillins GI Intolerance       Social History     Socioeconomic History   • Marital status:      Spouse name: Cruz   • Number of children: 0   Tobacco Use   • Smoking status: Never Smoker   • Smokeless tobacco: Never Used   Vaping Use   • Vaping Use: Never used   Substance and Sexual Activity   • Alcohol use: Not Currently     Alcohol/week: 7.0 standard drinks     Types: 4 Glasses of wine, 3 Cans of beer per week     Comment: 2 CUPS DAILY   • Drug use: No   • Sexual activity: Not Currently     Partners: Male     Birth control/protection: Post-menopausal       Family History   Problem Relation Age of " Onset   • Lung cancer Father    • Irritable bowel syndrome Father    • Cancer Mother    • Breast cancer Mother    • Liver disease Mother    • Breast cancer Sister 48   • Breast cancer Maternal Grandmother    • Breast cancer Paternal Grandmother    • Breast cancer Maternal Uncle    • Malig Hyperthermia Neg Hx        Review of Systems   Constitutional: Negative for activity change, appetite change, fatigue and unexpected weight change.   HENT: Negative for congestion, facial swelling, sore throat, trouble swallowing and voice change.    Eyes: Negative for photophobia and visual disturbance.   Respiratory: Negative for cough and choking.    Cardiovascular: Negative for chest pain.   Gastrointestinal: Negative for abdominal distention, abdominal pain, anal bleeding, blood in stool, constipation, diarrhea, nausea, rectal pain and vomiting.   Endocrine: Negative for polyphagia.   Musculoskeletal: Negative for arthralgias, gait problem and joint swelling.   Skin: Negative for color change, pallor and rash.   Allergic/Immunologic: Negative for food allergies.   Neurological: Negative for speech difficulty and headaches.   Hematological: Does not bruise/bleed easily.   Psychiatric/Behavioral: Negative for agitation, confusion and sleep disturbance.       Vitals:    10/26/21 1508   Temp: 97.8 °F (36.6 °C)       Physical Exam  Vitals reviewed.   Constitutional:       General: She is not in acute distress.     Appearance: She is well-developed. She is not diaphoretic.   HENT:      Head: Normocephalic.      Mouth/Throat:      Pharynx: No oropharyngeal exudate.   Eyes:      General: No scleral icterus.     Conjunctiva/sclera: Conjunctivae normal.   Neck:      Thyroid: No thyromegaly.   Cardiovascular:      Rate and Rhythm: Normal rate and regular rhythm.      Heart sounds: No murmur heard.      Pulmonary:      Effort: No respiratory distress.      Breath sounds: Normal breath sounds. No wheezing or rales.   Abdominal:       General: Bowel sounds are normal. There is no distension.      Palpations: Abdomen is soft. There is no mass.      Tenderness: There is no abdominal tenderness.   Musculoskeletal:         General: No tenderness. Normal range of motion.      Cervical back: Normal range of motion.   Lymphadenopathy:      Cervical: No cervical adenopathy.   Skin:     General: Skin is warm and dry.      Findings: No erythema or rash.   Neurological:      Mental Status: She is alert and oriented to person, place, and time.   Psychiatric:         Behavior: Behavior normal.         ASSESSMENT   #1 elevated LFTs: Progressive changes over the course of the last 4 months. Not clear whether this is of a neoplastic origin versus medication induced versus some other infectious or inflammatory process.  #2 metastatic breast cancer      PLAN  Check alpha-fetoprotein level if not already performed  Repeat labs as scheduled  Await liver biopsy results      ICD-10-CM ICD-9-CM   1. Elevated LFTs  R79.89 790.6   2. Metastatic breast cancer (HCC)  C50.919 174.9      Addendum  We will also obtain AARON serum protein electrophoresis and AMA once biopsy results are completed.

## 2021-10-27 ENCOUNTER — APPOINTMENT (OUTPATIENT)
Dept: ONCOLOGY | Facility: HOSPITAL | Age: 61
End: 2021-10-27

## 2021-10-27 ENCOUNTER — OFFICE VISIT (OUTPATIENT)
Dept: ONCOLOGY | Facility: CLINIC | Age: 61
End: 2021-10-27

## 2021-10-27 ENCOUNTER — LAB (OUTPATIENT)
Dept: LAB | Facility: HOSPITAL | Age: 61
End: 2021-10-27

## 2021-10-27 VITALS
WEIGHT: 157.3 LBS | BODY MASS INDEX: 25.28 KG/M2 | RESPIRATION RATE: 16 BRPM | SYSTOLIC BLOOD PRESSURE: 127 MMHG | HEIGHT: 66 IN | HEART RATE: 64 BPM | OXYGEN SATURATION: 98 % | DIASTOLIC BLOOD PRESSURE: 82 MMHG | TEMPERATURE: 97.1 F

## 2021-10-27 DIAGNOSIS — Z17.0 MALIGNANT NEOPLASM OF OVERLAPPING SITES OF LEFT BREAST IN FEMALE, ESTROGEN RECEPTOR POSITIVE (HCC): Primary | ICD-10-CM

## 2021-10-27 DIAGNOSIS — C50.812 MALIGNANT NEOPLASM OF OVERLAPPING SITES OF LEFT BREAST IN FEMALE, ESTROGEN RECEPTOR POSITIVE (HCC): Primary | ICD-10-CM

## 2021-10-27 DIAGNOSIS — Z79.899 ENCOUNTER FOR LONG-TERM (CURRENT) USE OF OTHER MEDICATIONS: ICD-10-CM

## 2021-10-27 DIAGNOSIS — I10 ESSENTIAL HYPERTENSION: ICD-10-CM

## 2021-10-27 DIAGNOSIS — Z17.0 MALIGNANT NEOPLASM OF OVERLAPPING SITES OF BOTH BREASTS IN FEMALE, ESTROGEN RECEPTOR POSITIVE (HCC): ICD-10-CM

## 2021-10-27 DIAGNOSIS — C50.812 MALIGNANT NEOPLASM OF OVERLAPPING SITES OF BOTH BREASTS IN FEMALE, ESTROGEN RECEPTOR POSITIVE (HCC): ICD-10-CM

## 2021-10-27 DIAGNOSIS — C50.811 MALIGNANT NEOPLASM OF OVERLAPPING SITES OF BOTH BREASTS IN FEMALE, ESTROGEN RECEPTOR POSITIVE (HCC): ICD-10-CM

## 2021-10-27 DIAGNOSIS — R74.8 ELEVATED LIVER ENZYMES: ICD-10-CM

## 2021-10-27 DIAGNOSIS — Z79.899 HIGH RISK MEDICATION USE: ICD-10-CM

## 2021-10-27 LAB
ALBUMIN SERPL-MCNC: 4.7 G/DL (ref 3.5–5.2)
ALBUMIN/GLOB SERPL: 2 G/DL (ref 1.1–2.4)
ALP SERPL-CCNC: 123 U/L (ref 38–116)
ALT SERPL W P-5'-P-CCNC: 475 U/L (ref 0–33)
ANION GAP SERPL CALCULATED.3IONS-SCNC: 10.3 MMOL/L (ref 5–15)
AST SERPL-CCNC: 228 U/L (ref 0–32)
BASOPHILS # BLD AUTO: 0.06 10*3/MM3 (ref 0–0.2)
BASOPHILS NFR BLD AUTO: 1.5 % (ref 0–1.5)
BILIRUB SERPL-MCNC: 0.3 MG/DL (ref 0.2–1.2)
BUN SERPL-MCNC: 19 MG/DL (ref 6–20)
BUN/CREAT SERPL: 21.8 (ref 7.3–30)
CALCIUM SPEC-SCNC: 9.7 MG/DL (ref 8.5–10.2)
CANCER AG15-3 SERPL-ACNC: 20.8 U/ML
CHLORIDE SERPL-SCNC: 104 MMOL/L (ref 98–107)
CO2 SERPL-SCNC: 25.7 MMOL/L (ref 22–29)
CREAT SERPL-MCNC: 0.87 MG/DL (ref 0.6–1.1)
DEPRECATED RDW RBC AUTO: 56.4 FL (ref 37–54)
EOSINOPHIL # BLD AUTO: 0.06 10*3/MM3 (ref 0–0.4)
EOSINOPHIL NFR BLD AUTO: 1.5 % (ref 0.3–6.2)
ERYTHROCYTE [DISTWIDTH] IN BLOOD BY AUTOMATED COUNT: 16.2 % (ref 12.3–15.4)
GFR SERPL CREATININE-BSD FRML MDRD: 66 ML/MIN/1.73
GLOBULIN UR ELPH-MCNC: 2.4 GM/DL (ref 1.8–3.5)
GLUCOSE SERPL-MCNC: 121 MG/DL (ref 74–124)
HCT VFR BLD AUTO: 40.8 % (ref 34–46.6)
HGB BLD-MCNC: 13 G/DL (ref 12–15.9)
IMM GRANULOCYTES # BLD AUTO: 0.01 10*3/MM3 (ref 0–0.05)
IMM GRANULOCYTES NFR BLD AUTO: 0.3 % (ref 0–0.5)
LYMPHOCYTES # BLD AUTO: 0.71 10*3/MM3 (ref 0.7–3.1)
LYMPHOCYTES NFR BLD AUTO: 18.2 % (ref 19.6–45.3)
MAGNESIUM SERPL-MCNC: 1.9 MG/DL (ref 1.8–2.5)
MCH RBC QN AUTO: 30.2 PG (ref 26.6–33)
MCHC RBC AUTO-ENTMCNC: 31.9 G/DL (ref 31.5–35.7)
MCV RBC AUTO: 94.9 FL (ref 79–97)
MONOCYTES # BLD AUTO: 0.24 10*3/MM3 (ref 0.1–0.9)
MONOCYTES NFR BLD AUTO: 6.1 % (ref 5–12)
NEUTROPHILS NFR BLD AUTO: 2.83 10*3/MM3 (ref 1.7–7)
NEUTROPHILS NFR BLD AUTO: 72.4 % (ref 42.7–76)
NRBC BLD AUTO-RTO: 0 /100 WBC (ref 0–0.2)
PLATELET # BLD AUTO: 192 10*3/MM3 (ref 140–450)
PMV BLD AUTO: 8.6 FL (ref 6–12)
POTASSIUM SERPL-SCNC: 3.9 MMOL/L (ref 3.5–4.7)
PROT SERPL-MCNC: 7.1 G/DL (ref 6.3–8)
RBC # BLD AUTO: 4.3 10*6/MM3 (ref 3.77–5.28)
SODIUM SERPL-SCNC: 140 MMOL/L (ref 134–145)
WBC # BLD AUTO: 3.91 10*3/MM3 (ref 3.4–10.8)

## 2021-10-27 PROCEDURE — 86300 IMMUNOASSAY TUMOR CA 15-3: CPT | Performed by: INTERNAL MEDICINE

## 2021-10-27 PROCEDURE — 83735 ASSAY OF MAGNESIUM: CPT

## 2021-10-27 PROCEDURE — 99214 OFFICE O/P EST MOD 30 MIN: CPT | Performed by: NURSE PRACTITIONER

## 2021-10-27 PROCEDURE — 36415 COLL VENOUS BLD VENIPUNCTURE: CPT

## 2021-10-27 PROCEDURE — 80053 COMPREHEN METABOLIC PANEL: CPT

## 2021-10-27 PROCEDURE — 85025 COMPLETE CBC W/AUTO DIFF WBC: CPT

## 2021-10-27 RX ORDER — DICLOFENAC SODIUM 75 MG/1
TABLET, DELAYED RELEASE ORAL
Qty: 60 TABLET | OUTPATIENT
Start: 2021-10-27

## 2021-10-27 NOTE — PROGRESS NOTES
Subjective     REASON FOR FOLLOW UP:      1.  Neglected left breast cancer with ulceration and bleeding.  2.  Right breast cancer with large right axillary adenopathy  3.  Family history of breast cancer in the mother, and sister.  BRCA negative  4.  Left ovarian cyst.  CA-125 negative  5.  Severe left hip osteoarthritis status post left hip replacement  6.  Elevated liver function studies    History of Present Illness    The patient is a 61 y.o. female with the above in history, who returns the office today for her weekly follow-up and lab review.  When she was last evaluated by Dr. Dixon 9/30/2021, her liver function studies were noted to be elevated.  At that time, she was instructed to hold Kisqali.  She has since had her labs monitored weekly.  Unfortunately, her liver function studies have continued to climb despite holding this medication.  She has been seen by Dr. Kirkpatrick, gastroenterology.  She had a negative hepatitis panel.  She is scheduled to undergo a liver biopsy on Monday, 11/1/2021.  He did have an CT of the abdomen which did not document disease progression in the liver in fact showing disease response from previous radiation therapy.    She reports she is overall feeling very well.  She denies any abdominal pain.  Her bowels are moving normally.  She is eating and drinking adequately.  She denies fevers or chills.  She denies signs or symptoms of bleeding.  Denies any new pain.  She does have intermittent osteoarthritis pain for which he utilizes diclofenac.  She knows to avoid Tylenol.    Past Medical History:   Diagnosis Date   • Anemia in neoplastic disease    • Arthritis    • Breast cancer (HCC)     Left   • CVA (cerebral vascular accident) (HCC)    • Hip pain     RIGHT HIP... CYST   • History of fracture of leg 1987   • History of radiation therapy     LAST TREATMENT     • Hypertension    • Limited joint range of motion (ROM)     RIGHT HIP   • Skin sore     OPEN SORE LEFT BREAST  "  • Syncope    • Vertigo            Past Surgical History:   Procedure Laterality Date   • AXILLARY LYMPH NODE BIOPSY/EXCISION Right     LYMPH NODE UNDER RIGHT ARM-MALIGNANT (DOUBLE MASTECTOMY)   • BREAST BIOPSY Left     MALIGNANT   • FRACTURE SURGERY  1987    Leg   • HARDWARE REMOVAL     • MASTECTOMY W/ SENTINEL NODE BIOPSY Bilateral 9/16/2019    Procedure: BILATERLA MODIFIED RADICAL MASTECTOMY WITH BILATERAL SENTINEL LYMPH NODE BIOPSY;  Surgeon: Joby Barron Jr., MD;  Location: [facility] MAIN OR;  Service: General   • TOTAL HIP ARTHROPLASTY Right 3/2/2020    Procedure: RIGHT TOTAL HIP ARTHROPLASTY NATALY NAVIGATION;  Surgeon: Luis M Leonard MD;  Location: [facility] MAIN OR;  Service: Orthopedics;  Laterality: Right;   • TOTAL HIP ARTHROPLASTY Left 7/20/2021    Procedure: Posterior LEFT TOTAL HIP ARTHROPLASTY NATALY NAVIGATION;  Surgeon: Luis M Leonard MD;  Location: [facility] OR;  Service: Orthopedics;  Laterality: Left;   • VENOUS ACCESS DEVICE (PORT) INSERTION Right 2/1/2019    Procedure: INSERTION VENOUS ACCESS DEVICE;  Surgeon: Joby Barron Jr., MD;  Location: [facility] OSC;  Service: General   • VENOUS ACCESS DEVICE (PORT) REMOVAL N/A 10/30/2019    Procedure: Mediport Removal;  Surgeon: Joby Barron Jr., MD;  Location: [facility] OR;  Service: General        Current Outpatient Medications on File Prior to Visit   Medication Sig Dispense Refill   • loperamide (IMODIUM) 2 MG capsule Take 2 mg by mouth 4 (Four) Times a Day As Needed for Diarrhea.     • magnesium oxide (MAG-OX) 400 MG tablet Take 400 mg by mouth 2 (two) times a day. Patient stated dose as \"1500mg\"     • metoprolol succinate XL (TOPROL-XL) 25 MG 24 hr tablet Take 1 tablet by mouth Daily. 30 tablet 2   • acetaminophen (TYLENOL) 650 MG 8 hr tablet Take 1,950 mg by mouth Every 8 (Eight) Hours As Needed.     • APPLE CIDER VINEGAR PO Take 1 tablet/day by mouth Daily.     • Cholecalciferol 250 MCG (60563 UT) tablet Take 1 tablet by mouth. " "Patient stated dose as \"1500 mg\"     • Chromium 200 MCG tablet Take 1 tablet by mouth. Patient was unable to state dose     • diclofenac (VOLTAREN) 75 MG EC tablet Take 75 mg by mouth 2 (Two) Times a Day.     • letrozole (FEMARA) 2.5 MG tablet Take 1 tablet by mouth Daily. 90 tablet 2   • meclizine (ANTIVERT) 25 MG tablet Take 1 tablet by mouth 3 (Three) Times a Day As Needed for Dizziness. 90 tablet 0   • NON FORMULARY Take 1 tablet by mouth Daily. TART CHERRY JUICE EXTRACT CAP     • ribociclib succinate (Kisqali, 600 MG Dose,) 200 MG tablet therapy pack tablet Take 600 mg by mouth Daily. Take for 21 days and then off 7 days       Current Facility-Administered Medications on File Prior to Visit   Medication Dose Route Frequency Provider Last Rate Last Admin   • Chlorhexidine Gluconate Cloth 2 % pads 1 each  1 each Apply externally Take As Directed Luis M Leonard MD            ALLERGIES:    Allergies   Allergen Reactions   • Benadryl [Diphenhydramine] Itching     INCREASES BLOOD PRESSURE   • Erythromycin GI Intolerance   • Levaquin [Levofloxacin] GI Intolerance   • Penicillins GI Intolerance        Social History     Socioeconomic History   • Marital status:      Spouse name: Cruz   • Number of children: 0   Tobacco Use   • Smoking status: Never Smoker   • Smokeless tobacco: Never Used   Vaping Use   • Vaping Use: Never used   Substance and Sexual Activity   • Alcohol use: Not Currently     Alcohol/week: 7.0 standard drinks     Types: 4 Glasses of wine, 3 Cans of beer per week     Comment: 2 CUPS DAILY   • Drug use: No   • Sexual activity: Not Currently     Partners: Male     Birth control/protection: Post-menopausal        Family History   Problem Relation Age of Onset   • Lung cancer Father    • Irritable bowel syndrome Father    • Cancer Mother    • Breast cancer Mother    • Liver disease Mother    • Breast cancer Sister 48   • Breast cancer Maternal Grandmother    • Breast cancer Paternal Grandmother  " "  • Breast cancer Maternal Uncle    • Malig Hyperthermia Neg Hx             Objective     Vitals:    10/27/21 0956   BP: 127/82   Pulse: 64   Resp: 16   Temp: 97.1 °F (36.2 °C)   TempSrc: Temporal   SpO2: 98%   Weight: 71.4 kg (157 lb 4.8 oz)   Height: 167.6 cm (65.98\")   PainSc: 0-No pain     Current Status 9/30/2021   ECOG score 0     PHYSICAL EXAM:  GENERAL:  Well-developed, well-nourished in no acute distress. Seen today with her brother.  SKIN:  Warm, dry without rashes, purpura or petechiae.  HEAD:  Normocephalic.  EYES:  Pupils equal, round.  EOMs intact.  Conjunctivae normal.  EARS:  Hearing intact.  CHEST:  Lungs clear to auscultation. Good airflow.  CARDIAC:  Regular rate and rhythm without murmurs. Normal S1,S2.  ABDOMEN:  Soft, nontender with no organomegaly or masses. Bowel sounds present  EXTREMITIES:  No clubbing, cyanosis or edema.  NEUROLOGICAL: No focal neurological deficits.  PSYCHIATRIC:  Normal affect and mood.      I have reexamined the patient and the results are consistent with the previously documented exam. ISAIAS Morales        RECENT LABS:  Results from last 7 days   Lab Units 10/27/21  0944   WBC 10*3/mm3 3.91   NEUTROS ABS 10*3/mm3 2.83   HEMOGLOBIN g/dL 13.0   HEMATOCRIT % 40.8   PLATELETS 10*3/mm3 192     Results from last 7 days   Lab Units 10/27/21  0944   SODIUM mmol/L 140   POTASSIUM mmol/L 3.9   CHLORIDE mmol/L 104   CO2 mmol/L 25.7   BUN mg/dL 19   CREATININE mg/dL 0.87   CALCIUM mg/dL 9.7   ALBUMIN g/dL 4.70   BILIRUBIN mg/dL 0.3   ALK PHOS U/L 123*   ALT (SGPT) U/L 475*   AST (SGOT) U/L 228*   GLUCOSE mg/dL 121   MAGNESIUM mg/dL 1.9           Assessment/Plan    1.  History of least for the last 4 years, she has had an in crescendo mass in the left breast that has produced a gigantic tumor that was close to 25 cm in size and is replacing most of the anatomy of the left breast. There are areas of ulceration necrosis and bleeding and the mass is fixed to the chest " wall.  After completion of 4 cycles of AC patient has had very dramatic improvement in all sites of disease including right axilla and left breast.    · Completed 4 cycles Adriamycin Cytoxan on 4/12/2019.    · Patient started weekly Taxol treatments on 5/3/2019.  · Completed 12 weekly Taxol treatments on 7/19/2019.  · 9/16/2019 bilateral mastectomy by Dr. Joby Barron with axillary lymph node dissection.  No residual malignancy.  · 10/30/2019 patient's Mediport was removed in anticipation of radiation.  · Radiation therapy under the care of Dr. Marmolejo 10/31/2019-1/13/2020 to the right chest wall.  · Started on adjuvant Femara 2.5 mg daily 8/20/2019.  · 9/21/2020 continues on adjuvant Femara, tolerating it quite well.  Some mild hot flashes and mild arthralgias, all which are tolerable.  The patient was further reviewed on 05/17/2021 along with her brother. The clinical examination has not changed. The only new complaint is the progressive amount of discomfort and the inability to function given the pain in the left hip area.   Radiologically speaking the CT scan of the chest, abdomen and pelvis to my eyes disclosed no abnormalities besides a very simple ovarian cyst that measures close to 7 x 5 cm with no trabeculation. There is no pelvic ascites. There is no pelvic adenopathy. The other abnormality obviously visible is the severe degree of scoliosis of the thoracic, lumbar spines.   PET scan 5/19/2021 with solitary mildly enlarged moderately hypermetabolic lymph node in the right cardiophrenic fat pad consistent with localized metastatic disease  Completed radiation to solitary recurrence  Plans for Kisqali 600 mg 3 weeks on, 1 week off was initiated June 2021 with excellent tolerance  Temporary hold of Kisqali 7/20/2021 for left hip replacement  Kisqali was resumed following surgery.  Kisqali held beginning 9/30/2021 due to elevated liver function studies  CT of the chest abdomen pelvis 10/7/2021 with near  complete resolution of previous hypermetabolic epicardial fat pad, no evidence of metastatic disease noted within the abdomen  She continues on Femara 2.5 mg daily while we work elevated liver function studies  Further improvement in CA 15-3 today at 20.8    2.  Left hip osteoarthritis.  · No metastatic disease noted on imaging  · Status post total hip replacement 7/20/2021 with Dr. Leonard    3.  Ovarian cyst  · This was not metabolically active on PET scan 5/19/2021  · Ca1 25 was negative    4.  Elevated liver function studies  · Initial elevation noted 9/30/2021 with AST 92,   · Weekly evaluation, labs of continue to increase  · Negative hepatitis panel 9/30/2021  · CT of the abdomen 10/7/2021 without evidence of metastatic disease in the liver  · Elevation by Dr. Kirkpatrick, gastroenterology 10/26/2021 with ultimate plans for CT-guided biopsy of the liver which is planned for 11/1/2021    PLAN:  1. Continue Femara 2.5 mg daily  2. Kisqali remains on hold due to elevated liver function studies  3. Patient will proceed with liver biopsy 11/1/2021 as scheduled  4. Follow-up with gastroenterology as scheduled  5. Follow-up with Dr. Dixon 11/19/2021 for review of liver biopsy results and determination of further plan of care  6. It was reviewed with the patient today, there is certainly comfort in the patient stable malignancy on imaging and improved CA 15-3 while we work up elevated liver function studies.    Today's plan of care was discussed reviewed with Dr. Dixon who is in agreement    ISAIAS Morales  10/27/2021

## 2021-10-28 ENCOUNTER — MEDICATION THERAPY MANAGEMENT (OUTPATIENT)
Dept: PHARMACY | Facility: HOSPITAL | Age: 61
End: 2021-10-28

## 2021-10-28 LAB — AFP-TM SERPL-MCNC: 2.6 NG/ML (ref 0–8.3)

## 2021-10-28 NOTE — PROGRESS NOTES
[facility] Lab Review: Kisqali        10/27/2021   WBC 3.40 - 10.80 10*3/mm3 3.91   Neutrophils Absolute 1.70 - 7.00 10*3/mm3 2.83   Hemoglobin 12.0 - 15.9 g/dL 13.0   Hematocrit 34.0 - 46.6 % 40.8   Platelets 140 - 450 10*3/mm3 192   Creatinine 0.60 - 1.10 mg/dL 0.87   eGFR Non African Am >60 mL/min/1.73 66   BUN 6 - 20 mg/dL 19   Sodium 134 - 145 mmol/L 140   Potassium 3.5 - 4.7 mmol/L 3.9   Glucose 74 - 124 mg/dL 121   Magnesium 1.8 - 2.5 mg/dL 1.9   Calcium 8.5 - 10.2 mg/dL 9.7   Albumin 3.50 - 5.20 g/dL 4.70   Total Protein 6.3 - 8.0 g/dL 7.1   AST (SGOT) 0 - 32 U/L 228 (A)   ALT (SGPT) 0 - 33 U/L 475 (A)   Alkaline Phosphatase 38 - 116 U/L 123 (A)   Total Bilirubin 0.2 - 1.2 mg/dL 0.3     LFTs remain elevated and Kisqali on hold.  Liver biopsy scheduled for next week.  Pharmacy will continue to follow.  Thanks,    Jimena Varela, PharmD

## 2021-11-01 ENCOUNTER — HOSPITAL ENCOUNTER (OUTPATIENT)
Dept: CT IMAGING | Facility: HOSPITAL | Age: 61
Discharge: HOME | End: 2021-11-01
Admitting: INTERNAL MEDICINE

## 2021-11-01 VITALS
HEIGHT: 66 IN | DIASTOLIC BLOOD PRESSURE: 80 MMHG | SYSTOLIC BLOOD PRESSURE: 111 MMHG | WEIGHT: 157 LBS | RESPIRATION RATE: 16 BRPM | OXYGEN SATURATION: 96 % | HEART RATE: 49 BPM | BODY MASS INDEX: 25.23 KG/M2 | TEMPERATURE: 98 F

## 2021-11-01 DIAGNOSIS — R74.8 ELEVATED LIVER ENZYMES: ICD-10-CM

## 2021-11-01 LAB
INR PPP: 1 (ref 0.8–1.2)
PLATELET # BLD AUTO: 177 10*3/MM3 (ref 140–450)
PROTHROMBIN TIME: 11.9 SECONDS (ref 12.8–15.2)

## 2021-11-01 PROCEDURE — 88313 SPECIAL STAINS GROUP 2: CPT | Performed by: INTERNAL MEDICINE

## 2021-11-01 PROCEDURE — 25010000002 FENTANYL CITRATE (PF) 50 MCG/ML SOLUTION: Performed by: RADIOLOGY

## 2021-11-01 PROCEDURE — 0 LIDOCAINE 1 % SOLUTION: Performed by: INTERNAL MEDICINE

## 2021-11-01 PROCEDURE — 88312 SPECIAL STAINS GROUP 1: CPT | Performed by: INTERNAL MEDICINE

## 2021-11-01 PROCEDURE — 25010000002 MIDAZOLAM PER 1 MG: Performed by: RADIOLOGY

## 2021-11-01 PROCEDURE — 85610 PROTHROMBIN TIME: CPT

## 2021-11-01 PROCEDURE — 88307 TISSUE EXAM BY PATHOLOGIST: CPT | Performed by: INTERNAL MEDICINE

## 2021-11-01 PROCEDURE — 77012 CT SCAN FOR NEEDLE BIOPSY: CPT

## 2021-11-01 PROCEDURE — 85049 AUTOMATED PLATELET COUNT: CPT | Performed by: RADIOLOGY

## 2021-11-01 RX ORDER — DICLOFENAC SODIUM 75 MG/1
TABLET, DELAYED RELEASE ORAL
Qty: 60 TABLET | OUTPATIENT
Start: 2021-11-01

## 2021-11-01 RX ORDER — SODIUM CHLORIDE 0.9 % (FLUSH) 0.9 %
3 SYRINGE (ML) INJECTION EVERY 12 HOURS SCHEDULED
Status: DISCONTINUED | OUTPATIENT
Start: 2021-11-01 | End: 2021-11-02 | Stop reason: HOSPADM

## 2021-11-01 RX ORDER — SODIUM CHLORIDE 9 MG/ML
25 INJECTION, SOLUTION INTRAVENOUS ONCE
Status: COMPLETED | OUTPATIENT
Start: 2021-11-01 | End: 2021-11-01

## 2021-11-01 RX ORDER — SODIUM CHLORIDE 0.9 % (FLUSH) 0.9 %
10 SYRINGE (ML) INJECTION AS NEEDED
Status: DISCONTINUED | OUTPATIENT
Start: 2021-11-01 | End: 2021-11-02 | Stop reason: HOSPADM

## 2021-11-01 RX ORDER — LIDOCAINE HYDROCHLORIDE 10 MG/ML
20 INJECTION, SOLUTION INFILTRATION; PERINEURAL ONCE
Status: COMPLETED | OUTPATIENT
Start: 2021-11-01 | End: 2021-11-01

## 2021-11-01 RX ORDER — MIDAZOLAM HYDROCHLORIDE 1 MG/ML
INJECTION INTRAMUSCULAR; INTRAVENOUS
Status: COMPLETED | OUTPATIENT
Start: 2021-11-01 | End: 2021-11-01

## 2021-11-01 RX ORDER — FENTANYL CITRATE 50 UG/ML
INJECTION, SOLUTION INTRAMUSCULAR; INTRAVENOUS
Status: COMPLETED | OUTPATIENT
Start: 2021-11-01 | End: 2021-11-01

## 2021-11-01 RX ADMIN — MIDAZOLAM 1 MG: 1 INJECTION INTRAMUSCULAR; INTRAVENOUS at 08:04

## 2021-11-01 RX ADMIN — FENTANYL CITRATE 50 MCG: 0.05 INJECTION, SOLUTION INTRAMUSCULAR; INTRAVENOUS at 08:04

## 2021-11-01 RX ADMIN — LIDOCAINE HYDROCHLORIDE 20 ML: 10 INJECTION, SOLUTION INFILTRATION; PERINEURAL at 08:04

## 2021-11-01 RX ADMIN — Medication 3 ML: at 08:28

## 2021-11-01 RX ADMIN — SODIUM CHLORIDE 25 ML/HR: 9 INJECTION, SOLUTION INTRAVENOUS at 07:50

## 2021-11-01 NOTE — POST-PROCEDURE NOTE
POST PROCEDURE NOTE    Procedure: ct liver biopsy    Pre-Procedure Diagnosis: elevated lft's    Post-procedure Diagnosis: same    Findings: technically successful ct guided liver biopsy    Complications: no immediate    Blood loss: none    Specimen Removed: 18 gauge core    Disposition:   Expected discharge home

## 2021-11-01 NOTE — NURSING NOTE
Patient arrived in xray radiology for CT Liver biopsy.    Protective goggles and mask in place with all patient interactions today

## 2021-11-01 NOTE — DISCHARGE INSTRUCTIONS
Discharge Instructions after receiving Moderate Sedation  These instructions provide you with information about caring for yourself after your procedure. Your health care provider may also give you more specific instructions. Your treatment has been planned according to current medical practices, but problems sometimes occur. Call your physican or the Radiology Nurses ([phone]) if you have any problems or questions after your procedure.    What can I expect after the procedure?  After your procedure, you may:  Feel sleepy or light headed for several hours.  Feel clumsy, dizzy or have poor balance for several hours.  Feel forgetful about what happened after the procedure.  Have poor judgment for several hours.  Feel nauseous or vomit.    For at least 24 hours after the procedure:  Have a responsible adult stay with you or frequently check on you until you are awake and alert.   Rest as needed.    Do not:  Participate in activities in which you could fall or become injured.  Drive or operate heavy machinery  Take sleeping pills or medicines that cause drowsiness.  Make important decisions or sign legal documents.  Take care of children on your own.    Eating and drinking: Clear liquids then progress slowly to a normal diet.  If you vomit, drink water, juice, or soup when you can drink without vomiting.  Make sure you have little or no nausea before eating solid foods.    General instructions: Take over-the-counter and prescription medicines only as told by your health care provider .If you have sleep apnea, surgery and certain medicines can increase your risk for breathing problems. Follow instructions from your health care provider about wearing your sleep device: If you smoke, do not smoke without supervision.  Keep all follow-up visits as told by your health care provider. This is important.    Contact your physician if:  You continue to keep feeling nauseous or you keep vomiting. You continue to feel  light-headed, develop a fever or a rash.  Get help right away if you have trouble breathing    I acknowledge the above instructions:    Patient/ Responsible person _________________________________Date ____________Time ____________    Witnessed by____________________________________________ Date_____________ Time____________              EDUCATION /DISCHARGE INSTRUCTIONS  CT/US guided biopsy:  A biopsy is a procedure done to remove tissue for further analysis.  Before images are taken to locate the target area.  Images can be obtained using ultrasound, CT or MRI.  A physician will clean your skin with antiseptic soap, place a sterile towel around the site and administer a local anesthetic to numb the area.  The physician will then insert a special needle.  Sometimes images are taken of the needle after it is inserted to ensure the needle is in the correct area to be biopsied.   A sample is obtained and sent to the laboratory for study.  Occasionally the laboratory is unable to make a diagnosis from the sample and the procedure may need to be repeated.  Within a week the radiologist will send a report to your physician.  A pathologist will also examine the tissue and send a report.    Risks of the procedure include but are not limited to:   *  Bleeding    *  Infection   *  Puncture of surrounding organs *  Death     *  Lung collapse if the biopsy is near the chest which may require insertion of a      chest tube to re-inflate the lung if severe.    Benefits of the procedure:  Using x-ray helps to locate the area that requires a biopsy. The procedure is less invasive than a surgical procedure, there are no large incisions and it does not require anesthesia.    Alternatives to the procedure:  A biopsy can be performed surgically.  Risks of a surgical biopsy include exposure to anesthesia, infection, excessive bleeding and injury to abdominal organs.  A benefit of surgical biopsy is the ability to see the area to be  biopsied and remove of a larger piece of tissue.    THIS EDUCATION INFORMATION WAS REVIEWED PRIOR TO PROCEDURE AND CONSENT. Patient initials__________________Time___________________    Post Procedure:    *  Expect the biopsy site may be tender up to one week.    *  Rest today (no pushing pulling or straining).   *  Slowly increase activity tomorrow.    *  If you received sedation do not drive for 24 hours.   *  Keep dressing clean and dry.   *  Leave dressing on puncture site for 24 hours.    *  You may shower when dressing removed.  Call your doctor if experiencing:   *  Signs of infection such as redness, swelling, excessive pain and / or foul        smelling drainage from the puncture site.   *  Chills or fever over 101 degrees (by mouth).   *  Unrelieved pain.   *  Any new or severe symptoms.   *  If experiencing sudden / severe shortness of breath or chest pain go to the       nearest emergency room.   Following the procedure:     Follow-up with the ordering physician as directed.    Continue to take other medications as directed by your physician unless    otherwise instructed.    If you have any concerns please call the Radiology Nurses Desk at [phone] 7am-10pm.  You are the most important factor in your recovery.  Follow the above instructions carefully.

## 2021-11-02 ENCOUNTER — TELEPHONE (OUTPATIENT)
Dept: ONCOLOGY | Facility: CLINIC | Age: 61
End: 2021-11-02

## 2021-11-02 ENCOUNTER — TELEPHONE (OUTPATIENT)
Dept: INTERVENTIONAL RADIOLOGY/VASCULAR | Facility: HOSPITAL | Age: 61
End: 2021-11-02

## 2021-11-02 ENCOUNTER — HOSPITAL ENCOUNTER (EMERGENCY)
Facility: HOSPITAL | Age: 61
Discharge: HOME | End: 2021-11-02
Attending: EMERGENCY MEDICINE | Admitting: EMERGENCY MEDICINE

## 2021-11-02 ENCOUNTER — APPOINTMENT (OUTPATIENT)
Dept: GENERAL RADIOLOGY | Facility: HOSPITAL | Age: 61
End: 2021-11-02

## 2021-11-02 VITALS
DIASTOLIC BLOOD PRESSURE: 99 MMHG | HEART RATE: 80 BPM | OXYGEN SATURATION: 98 % | SYSTOLIC BLOOD PRESSURE: 168 MMHG | TEMPERATURE: 98.1 F | RESPIRATION RATE: 14 BRPM

## 2021-11-02 DIAGNOSIS — S73.004A CLOSED DISLOCATION OF RIGHT HIP, INITIAL ENCOUNTER (HCC): Primary | ICD-10-CM

## 2021-11-02 LAB
LAB AP CASE REPORT: NORMAL
LAB AP CLINICAL INFORMATION: NORMAL
LAB AP DIAGNOSIS COMMENT: NORMAL
LAB AP SPECIAL STAINS: NORMAL
PATH REPORT.FINAL DX SPEC: NORMAL
PATH REPORT.GROSS SPEC: NORMAL

## 2021-11-02 PROCEDURE — 72170 X-RAY EXAM OF PELVIS: CPT

## 2021-11-02 PROCEDURE — 99152 MOD SED SAME PHYS/QHP 5/>YRS: CPT

## 2021-11-02 PROCEDURE — 25010000002 PROPOFOL 10 MG/ML EMULSION: Performed by: EMERGENCY MEDICINE

## 2021-11-02 PROCEDURE — 73501 X-RAY EXAM HIP UNI 1 VIEW: CPT

## 2021-11-02 PROCEDURE — 25010000002 FENTANYL CITRATE (PF) 50 MCG/ML SOLUTION: Performed by: EMERGENCY MEDICINE

## 2021-11-02 PROCEDURE — 99284 EMERGENCY DEPT VISIT MOD MDM: CPT

## 2021-11-02 PROCEDURE — 99283 EMERGENCY DEPT VISIT LOW MDM: CPT

## 2021-11-02 RX ORDER — PROPOFOL 10 MG/ML
VIAL (ML) INTRAVENOUS
Status: COMPLETED | OUTPATIENT
Start: 2021-11-02 | End: 2021-11-02

## 2021-11-02 RX ORDER — DICLOFENAC SODIUM 75 MG/1
TABLET, DELAYED RELEASE ORAL
Qty: 60 TABLET | Refills: 3 | Status: SHIPPED | OUTPATIENT
Start: 2021-11-02 | End: 2021-11-19

## 2021-11-02 RX ORDER — PROPOFOL 10 MG/ML
40 VIAL (ML) INTRAVENOUS ONCE
Status: DISCONTINUED | OUTPATIENT
Start: 2021-11-02 | End: 2021-11-02 | Stop reason: HOSPADM

## 2021-11-02 RX ORDER — FENTANYL CITRATE 50 UG/ML
25 INJECTION, SOLUTION INTRAMUSCULAR; INTRAVENOUS
Status: DISCONTINUED | OUTPATIENT
Start: 2021-11-02 | End: 2021-11-02 | Stop reason: HOSPADM

## 2021-11-02 RX ADMIN — PROPOFOL 30 MG: 10 INJECTION, EMULSION INTRAVENOUS at 16:38

## 2021-11-02 RX ADMIN — PROPOFOL 30 MG: 10 INJECTION, EMULSION INTRAVENOUS at 16:39

## 2021-11-02 RX ADMIN — PROPOFOL 10 MG: 10 INJECTION, EMULSION INTRAVENOUS at 16:35

## 2021-11-02 RX ADMIN — PROPOFOL 20 MG: 10 INJECTION, EMULSION INTRAVENOUS at 16:34

## 2021-11-02 RX ADMIN — PROPOFOL 40 MG: 10 INJECTION, EMULSION INTRAVENOUS at 16:33

## 2021-11-02 RX ADMIN — FENTANYL CITRATE 25 MCG: 50 INJECTION INTRAMUSCULAR; INTRAVENOUS at 16:29

## 2021-11-02 NOTE — ED TRIAGE NOTES
Pt was walking up the stairs and felt a pop inher right hip and mow she can't bear weight.  She feels spasms in her hip.  She had a hip replacement in that hip last year    Patient was placed in face mask during first look triage.  Patient was wearing a face mask throughout encounter.  I wore personal protective equipment throughout the encounter.  Hand hygiene was performed before and after patient encounter.

## 2021-11-02 NOTE — TELEPHONE ENCOUNTER
Provider: DR JOSUE    Caller: RHONDA    Relationship to Patient: SELF    Pharmacy: [facility]    Phone Number: [phone]    Reason for Call: RHONDA CALLED AND STATED THAT DR JOSUE IS THE DOCTOR THAT PRESCRIBED THAT MEDICATION. SHE STATES THAT HER PHARMACY IS GOING TO FAX A HARD COPY TO THE OFFICE.  SHE IS NOT UNDERSTANDING WHY SHE IS HAVING SUCH A HARD TIME FILLING THIS RX.      PLEASE CALL PATIENT TO DISCUSS.

## 2021-11-02 NOTE — TELEPHONE ENCOUNTER
Caller: Rhonda Georges    Relationship: Self    Best call back number: [phone]    What is the best time to reach you: ANGIE    Who are you requesting to speak with (clinical staff, provider,  specific staff member): CLINICAL    What was the call regarding: RHONDA IS CALLING WANTING TO KNOW WHY HER MEDICATION FOR diclofenac (VOLTAREN) 75 MG EC tablet  WAS DENIED. SHE IS OUT OF THIS MEDICATION.   PLEASE CALL TO DISCUSS AND ADVISE.    Do you require a callback: YES

## 2021-11-02 NOTE — DISCHARGE INSTRUCTIONS
Continue any current medications, follow-up with orthopedics as discussed, avoid riding horses until cleared by orthopedic surgeon, ED return for worsening symptoms as needed.

## 2021-11-02 NOTE — ED PROVIDER NOTES
EMERGENCY DEPARTMENT ENCOUNTER    Room Number:  22/22  Date of encounter:  11/2/2021  PCP: Provider, No Known  Historian: Patient      HPI:  Chief Complaint: Right hip pain  A complete HPI/ROS/PMH/PSH/SH/FH are unobtainable due to: None    Context: Marylu Georges is a 61 y.o. female who presents to the ED via [facility] from home with acute onset of right hip pain and inability to walk after she felt a pop while walking up the stairs.  Patient with a history of hip placement there.  Denies any direct fall or trauma otherwise.  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      MEDICAL RECORD REVIEW    Right hip replaced in 2020 by Dr. Leonard    PAST MEDICAL HISTORY  Active Ambulatory Problems     Diagnosis Date Noted   • Malignant neoplasm of overlapping sites of left breast in female, estrogen receptor positive (HCC) 01/22/2019   • Family history of breast cancer in female 01/22/2019   • Syncope 07/30/2019   • Malignant neoplasm of overlapping sites of both breasts in female, estrogen receptor positive (HCC) 11/10/2019   • Hypertension 12/17/2019   • Encounter for long-term (current) use of other medications 06/22/2021   • Drug-induced hepatitis 09/30/2021     Resolved Ambulatory Problems     Diagnosis Date Noted   • Anemia in neoplastic disease 01/22/2019   • Axillary mass, right 01/22/2019   • Poor venous access 01/30/2019   • Fitting and adjustment of vascular catheter 02/01/2019   • Nausea & vomiting 07/30/2019   • Macrocytosis 07/30/2019   • Immunocompromised state due to drug therapy (HCC) 07/30/2019   • Encounter for venous access device care 10/28/2019   • Acute radiation dermatitis 12/17/2019   • Arthritis of right hip 01/13/2020   • Arthritis of left hip 05/26/2021     Past Medical History:   Diagnosis Date   • Arthritis    • Breast cancer (HCC)    • CVA (cerebral vascular accident) (HCC)    • Hip pain    • History of fracture of leg 1987   • History of radiation therapy    • Limited joint  range of motion (ROM)    • Skin sore    • Vertigo          PAST SURGICAL HISTORY  Past Surgical History:   Procedure Laterality Date   • AXILLARY LYMPH NODE BIOPSY/EXCISION Right     LYMPH NODE UNDER RIGHT ARM-MALIGNANT (DOUBLE MASTECTOMY)   • BREAST BIOPSY Left     MALIGNANT   • FRACTURE SURGERY  1987    Leg   • HARDWARE REMOVAL     • MASTECTOMY W/ SENTINEL NODE BIOPSY Bilateral 9/16/2019    Procedure: BILATERLA MODIFIED RADICAL MASTECTOMY WITH BILATERAL SENTINEL LYMPH NODE BIOPSY;  Surgeon: Joby Barron Jr., MD;  Location: [facility] MAIN OR;  Service: General   • TOTAL HIP ARTHROPLASTY Right 3/2/2020    Procedure: RIGHT TOTAL HIP ARTHROPLASTY NATALY NAVIGATION;  Surgeon: Luis M Leonard MD;  Location: [facility] OR;  Service: Orthopedics;  Laterality: Right;   • TOTAL HIP ARTHROPLASTY Left 7/20/2021    Procedure: Posterior LEFT TOTAL HIP ARTHROPLASTY NATALY NAVIGATION;  Surgeon: Luis M Leonard MD;  Location: [facility] OR;  Service: Orthopedics;  Laterality: Left;   • VENOUS ACCESS DEVICE (PORT) INSERTION Right 2/1/2019    Procedure: INSERTION VENOUS ACCESS DEVICE;  Surgeon: Joby Barron Jr., MD;  Location: [facility];  Service: General   • VENOUS ACCESS DEVICE (PORT) REMOVAL N/A 10/30/2019    Procedure: Mediport Removal;  Surgeon: Joby Barron Jr., MD;  Location: [facility] OR;  Service: General         FAMILY HISTORY  Family History   Problem Relation Age of Onset   • Lung cancer Father    • Irritable bowel syndrome Father    • Cancer Mother    • Breast cancer Mother    • Liver disease Mother    • Breast cancer Sister 48   • Breast cancer Maternal Grandmother    • Breast cancer Paternal Grandmother    • Breast cancer Maternal Uncle    • Malig Hyperthermia Neg Hx          SOCIAL HISTORY  Social History     Socioeconomic History   • Marital status:      Spouse name: Cruz   • Number of children: 0   Tobacco Use   • Smoking status: Never Smoker   • Smokeless tobacco: Never Used   Vaping Use    • Vaping Use: Never used   Substance and Sexual Activity   • Alcohol use: Not Currently     Alcohol/week: 7.0 standard drinks     Types: 4 Glasses of wine, 3 Cans of beer per week     Comment: 2 CUPS DAILY   • Drug use: No   • Sexual activity: Not Currently     Partners: Male     Birth control/protection: Post-menopausal         ALLERGIES  Benadryl [diphenhydramine], Erythromycin, Levaquin [levofloxacin], and Penicillins        REVIEW OF SYSTEMS  Review of Systems     All systems reviewed and negative except for those discussed in HPI.       PHYSICAL EXAM    I have reviewed the triage vital signs and nursing notes.    ED Triage Vitals   Temp Heart Rate Resp BP SpO2   11/02/21 1524 11/02/21 1524 11/02/21 1524 11/02/21 1524 11/02/21 1524   98.1 °F (36.7 °C) 67 16 (!) 159/107 100 %      Temp src Heart Rate Source Patient Position BP Location FiO2 (%)   11/02/21 1524 11/02/21 1524 11/02/21 1631 11/02/21 1631 --   Tympanic Monitor Lying Left arm        Physical Exam  General: No acute distress, non-toxic  HEENT: Mucous membranes moist, atraumatic, EOMI  Neck: Full ROM  Pulm: Symmetric chest rise, nonlabored, lungs CTAB  Cardiovascular: Regular rate and rhythm, intact distal pulses  GI: Soft, nontender, nondistended, no rebound, no guarding, bowel sounds present  MSK: Right leg shortened and internally rotated  Skin: Warm, dry  Neuro: Awake, alert, oriented x 4, GCS 15, moving all extremities, no focal deficits  Psych: Calm, cooperative      N95, protective eye goggles, and gloves used during this encounter. Patient in surgical mask.      LAB RESULTS  No results found for this or any previous visit (from the past 24 hour(s)).        RADIOLOGY  XR Pelvis 1 or 2 View    Result Date: 11/2/2021  XR PELVIS 1 OR 2 VW-  INDICATIONS: Pain  TECHNIQUE: Frontal views of the pelvis  COMPARISON: None available  FINDINGS:  The femoral component of the right hip arthroplasty hardware is superolaterally dislocated in relation to the  acetabular component. The left hip arthroplasty hardware is normally located. No obvious displaced fracture is identified on these frontal views. Degenerative changes of the lumbar spine are noted.       Right hip dislocation.  This report was finalized on 11/2/2021 4:08 PM by Dr. Anderson Shaikh M.D.      XR Hip With or Without Pelvis 1 View Right    Result Date: 11/2/2021  RIGHT HIP, SINGLE VIEW  HISTORY: Status post closed reduction.  COMPARISON: Comparison is made to the plain film examination of the right hip from earlier the same day.  FINDINGS: A single AP view of the right hip demonstrates a right hip prosthesis. There has been successful closed reduction of the dislocated femoral component. There is no evidence of fracture.        I ordered the above noted radiological studies. Reviewed by me.  See dictation for official radiology interpretation.      PROCEDURES    Procedural Sedation    Date/Time: 11/2/2021 4:44 PM  Performed by: Jimy Hankins MD  Authorized by: Jimy Hankins MD     Consent:     Consent obtained:  Verbal and written    Consent given by:  Patient    Risks discussed:  Allergic reaction, dysrhythmia, inadequate sedation, nausea, prolonged hypoxia resulting in organ damage, prolonged sedation necessitating reversal, respiratory compromise necessitating ventilatory assistance and intubation and vomiting  Indications:     Procedure performed:  Dislocation reduction    Intended level of sedation:  Moderate (conscious sedation)  Pre-sedation assessment:     NPO status caution: urgency dictates proceeding with non-ideal NPO status      ASA classification: class 2 - patient with mild systemic disease      Neck mobility: normal      Mouth opening:  3 or more finger widths    Mallampati score:  I - soft palate, uvula, fauces, pillars visible    Pre-sedation assessments completed and reviewed: airway patency, anesthesia/sedation history, cardiovascular function, hydration status, mental status,  nausea/vomiting, pain level, respiratory function and temperature      History of difficult intubation: no    Immediate pre-procedure details:     Reassessment: Patient reassessed immediately prior to procedure      Reviewed: vital signs and relevant labs/tests      Verified: bag valve mask available, emergency equipment available, intubation equipment available, IV patency confirmed, oxygen available and suction available    Procedure details (see MAR for exact dosages):     Preoxygenation:  Nasal cannula    Sedation:  Propofol    Analgesia:  Fentanyl    Intra-procedure monitoring:  Blood pressure monitoring, cardiac monitor, continuous capnometry, continuous pulse oximetry, frequent LOC assessments and frequent vital sign checks    Intra-procedure events: none      Total sedation time (minutes):  12  Post-procedure details:     Attendance: Constant attendance by certified staff until patient recovered      Recovery: Patient returned to pre-procedure baseline      Complications:  None    Post-sedation assessments completed and reviewed: airway patency, cardiovascular function, hydration status, mental status, nausea/vomiting, pain level and respiratory function      Patient tolerance:  Tolerated well, no immediate complications          MEDICATIONS GIVEN IN ER    Medications   Propofol (DIPRIVAN) injection 40 mg (has no administration in time range)   fentaNYL citrate (PF) (SUBLIMAZE) injection 25 mcg (25 mcg Intravenous Given 11/2/21 1629)   Propofol (DIPRIVAN) injection (40 mg Intravenous Given 11/2/21 1633)   Propofol (DIPRIVAN) injection (30 mg Intravenous Given 11/2/21 1639)         PROGRESS, DATA ANALYSIS, CONSULTS, AND MEDICAL DECISION MAKING    All labs have been independently reviewed by me.  All radiology studies have been reviewed by me and discussed with radiologist dictating the report.   EKG's independently viewed and interpreted by me.  Discussion below represents my analysis of pertinent findings  related to patient's condition, differential diagnosis, treatment plan and final disposition.    Initial concern for fracture versus dislocation versus fracture/dislocation.  Plan for x-rays, pain control, and intervention as needed.    X-ray shows a right hip dislocation, plan for conscious sedation and manual reduction.    Preprocedural informed consent obtained.    ED Course as of 11/02/21 1901 Tue Nov 02, 2021   1717 Patient awake and alert after coming out of sedation, pain well controlled and with no complaints at this time, awaiting orthopedic callback, repeat xr with good placement of the hip on reduction [DC]   6002 Discussed with Dr. Mccoy, orthopedics, discussed patient clinical course and back today, successful reduction of the right hip dislocation, he is comfortable with patient being discharged at this time with close outpatient follow-up with Dr. Leonard.  [DC]   9770 Patient and  updated on this discussion, plan to ambulate in the emergency department with plans for discharge.  She is having no pain at this time.  Did advised to avoid riding horses until cleared by her orthopedic surgeon.  [DC]      ED Course User Index  [DC] Jimy Hankins MD     Patient ambulated well with no difficulties in the emergency department.    AS OF 19:01 EDT VITALS:    BP - 168/99  HR - 65  TEMP - 98.1 °F (36.7 °C) (Tympanic)  02 SATS - 98%        DIAGNOSIS  Final diagnoses:   Closed dislocation of right hip, initial encounter ([facility])         DISPOSITION  DISCHARGE    Patient discharged in stable condition.    Reviewed implications of results, diagnosis, meds, responsibility to follow up, warning signs and symptoms of possible worsening, potential complications and reasons to return to ER.    Patient/Family voiced understanding of above instructions.    Discussed plan for discharge, as there is no emergent indication for admission. Patient referred to primary care provider for BP management due to today's BP.  Pt/family is agreeable and understands need for follow up and repeat testing.  Pt is aware that discharge does not mean that nothing is wrong but it indicates no emergency is present that requires admission and they must continue care with follow-up as given below or physician of their choice.     FOLLOW-UP  [facility] Emergency Department  [address] [address] [phone]    As needed, If symptoms worsen    Luis M Leonard MD  [address] [id number] [phone]    Schedule an appointment as soon as possible for a visit            Medication List      No changes were made to your prescriptions during this visit.                    Jimy Hankins MD  11/02/21 9447

## 2021-11-02 NOTE — ED PROVIDER NOTES
Lower Extremity Dislocation    Date/Time: 11/2/2021 4:43 PM  Performed by: Anne Tompkins APRN  Authorized by: Jimy Hankins MD   Consent: Verbal consent obtained.  Risks and benefits: risks, benefits and alternatives were discussed  Consent given by: patient  Patient understanding: patient states understanding of the procedure being performed  Patient consent: the patient's understanding of the procedure matches consent given  Procedure consent: procedure consent matches procedure scheduled  Relevant documents: relevant documents present and verified  Test results: test results available and properly labeled  Site marked: the operative site was marked  Imaging studies: imaging studies available  Required items: required blood products, implants, devices, and special equipment available  Patient identity confirmed: verbally with patient  Injury location: hip  Location details: right hip  Injury type: dislocation  Dislocation type: posterior  Spontaneous dislocation: no  Prosthesis: yes  Pre-procedure neurovascular assessment: neurovascularly intact  Pre-procedure distal perfusion: normal  Pre-procedure neurological function: normal  Pre-procedure range of motion: reduced    Anesthesia:  Local anesthesia used: no    Sedation:  Patient sedated: Patient sedated per Dr. Hankins.  Please see his note.    Manipulation performed: yes  Reduction method: traction and counter traction  Reduction successful: yes  X-ray confirmed reduction: yes  Post-procedure neurovascular assessment: post-procedure neurovascularly intact  Post-procedure distal perfusion: normal  Post-procedure neurological function: normal  Post-procedure range of motion: normal  Patient tolerance: patient tolerated the procedure well with no immediate complications             Anne Tompkins APRN  11/02/21 1644

## 2021-11-02 NOTE — ED NOTES
Ambulated pt to the restroom. Pt walks with a stable gait and complains of mild pain in the hip. PT went to the restroom and required no assistance. Placed pt into bed and back on monitor. DEBBY Brady notified.      Geovanna Neri  11/02/21 0819

## 2021-11-02 NOTE — TELEPHONE ENCOUNTER
Replied to pt Nexi message- Dr Dixon did not prescribe diclofenac and she should contact her surgeon or whomever prescribed it initially per Dr Dixon

## 2021-11-03 ENCOUNTER — OFFICE VISIT (OUTPATIENT)
Dept: ORTHOPEDIC SURGERY | Facility: CLINIC | Age: 61
End: 2021-11-03

## 2021-11-03 ENCOUNTER — TELEPHONE (OUTPATIENT)
Dept: ONCOLOGY | Facility: CLINIC | Age: 61
End: 2021-11-03

## 2021-11-03 VITALS — TEMPERATURE: 96.6 F | BODY MASS INDEX: 25.23 KG/M2 | WEIGHT: 157 LBS | HEIGHT: 66 IN

## 2021-11-03 DIAGNOSIS — R74.8 ELEVATED LIVER ENZYMES: ICD-10-CM

## 2021-11-03 DIAGNOSIS — S73.006S: ICD-10-CM

## 2021-11-03 DIAGNOSIS — Z17.0 MALIGNANT NEOPLASM OF OVERLAPPING SITES OF LEFT BREAST IN FEMALE, ESTROGEN RECEPTOR POSITIVE (HCC): Primary | ICD-10-CM

## 2021-11-03 DIAGNOSIS — C50.812 MALIGNANT NEOPLASM OF OVERLAPPING SITES OF LEFT BREAST IN FEMALE, ESTROGEN RECEPTOR POSITIVE (HCC): Primary | ICD-10-CM

## 2021-11-03 DIAGNOSIS — Z96.643 STATUS POST TOTAL REPLACEMENT OF BOTH HIPS: Primary | ICD-10-CM

## 2021-11-03 PROCEDURE — 99213 OFFICE O/P EST LOW 20 MIN: CPT | Performed by: ORTHOPAEDIC SURGERY

## 2021-11-03 RX ORDER — PREDNISONE 10 MG/1
10 TABLET ORAL DAILY
Qty: 30 TABLET | Refills: 0 | Status: SHIPPED | OUTPATIENT
Start: 2021-11-03 | End: 2021-12-10

## 2021-11-03 NOTE — TELEPHONE ENCOUNTER
----- Message from Elie Dixon MD sent at 11/3/2021 12:58 PM EDT -----  Call her stop the femara maybe it is the culprit of the liver abnormalities, start prednisone 10 mg po q day, continue weekly cmp and rn and visit with me ramses

## 2021-11-03 NOTE — PROGRESS NOTES
"Patient Name: Marylu Georges   YOB: 1960  Referring Primary Care Physician: Provider, No Known  BMI: Body mass index is 25.34 kg/m².    Chief Complaint:    Chief Complaint   Patient presents with   • Right Hip - Follow-up        HPI:     Marylu Georges is a 61 y.o. female who presents today for evaluation of   Chief Complaint   Patient presents with   • Right Hip - Follow-up   .  Kelsie follows up today on her right hip.  She has had bilateral total hip replacements with the left back in July and the right on March 6, 2020.  She says yesterday she was stepping up on a very high step on her porch sounds like she internally rotated her right leg and felt a pop in the hip and fell down she was seen in the emergency room where apparently they reduced and sent her here today she says she is really not having pain.      Subjective   Medications:   Home Medications:  Current Outpatient Medications on File Prior to Visit   Medication Sig   • Cholecalciferol 250 MCG (95631 UT) tablet Take 1 tablet by mouth. Patient stated dose as \"1500 mg\"   • diclofenac (VOLTAREN) 75 MG EC tablet TAKE 1 TABLET BY MOUTH TWICE DAILY   • letrozole (FEMARA) 2.5 MG tablet Take 1 tablet by mouth Daily.   • loperamide (IMODIUM) 2 MG capsule Take 2 mg by mouth 4 (Four) Times a Day As Needed for Diarrhea.   • magnesium oxide (MAG-OX) 400 MG tablet Take 400 mg by mouth 2 (two) times a day. Patient stated dose as \"1500mg\"   • meclizine (ANTIVERT) 25 MG tablet Take 1 tablet by mouth 3 (Three) Times a Day As Needed for Dizziness.   • metoprolol succinate XL (TOPROL-XL) 25 MG 24 hr tablet Take 1 tablet by mouth Daily.   • acetaminophen (TYLENOL) 650 MG 8 hr tablet Take 1,950 mg by mouth Every 8 (Eight) Hours As Needed.   • APPLE CIDER VINEGAR PO Take 1 tablet/day by mouth Daily.   • Chromium 200 MCG tablet Take 1 tablet by mouth. Patient was unable to state dose   • NON FORMULARY Take 1 tablet by mouth Daily. TART CHERRY JUICE EXTRACT " CAP   • ribociclib succinate (Kisqali, 600 MG Dose,) 200 MG tablet therapy pack tablet Take 600 mg by mouth Daily. Take for 21 days and then off 7 days     Current Facility-Administered Medications on File Prior to Visit   Medication   • Chlorhexidine Gluconate Cloth 2 % pads 1 each   • [COMPLETED] Propofol (DIPRIVAN) injection   • [COMPLETED] Propofol (DIPRIVAN) injection   • [DISCONTINUED] fentaNYL citrate (PF) (SUBLIMAZE) injection 25 mcg   • [DISCONTINUED] Propofol (DIPRIVAN) injection 40 mg     Current Medications:  Scheduled Meds:  Continuous Infusions:No current facility-administered medications for this visit.    PRN Meds:.    I have reviewed the patient's medical history in detail and updated the computerized patient record.  Review and summarization of old records includes:    Past Medical History:   Diagnosis Date   • Anemia in neoplastic disease    • Arthritis    • Breast cancer (HCC)     Left   • CVA (cerebral vascular accident) (HCC)    • Hip pain     RIGHT HIP... CYST   • History of fracture of leg 1987   • History of radiation therapy     LAST TREATMENT     • Hypertension    • Limited joint range of motion (ROM)     RIGHT HIP   • Skin sore     OPEN SORE LEFT BREAST   • Syncope    • Vertigo         Past Surgical History:   Procedure Laterality Date   • AXILLARY LYMPH NODE BIOPSY/EXCISION Right     LYMPH NODE UNDER RIGHT ARM-MALIGNANT (DOUBLE MASTECTOMY)   • BREAST BIOPSY Left     MALIGNANT   • FRACTURE SURGERY  1987    Leg   • HARDWARE REMOVAL     • MASTECTOMY W/ SENTINEL NODE BIOPSY Bilateral 9/16/2019    Procedure: BILATERLA MODIFIED RADICAL MASTECTOMY WITH BILATERAL SENTINEL LYMPH NODE BIOPSY;  Surgeon: Joby Barron Jr., MD;  Location: [facility] OR;  Service: General   • TOTAL HIP ARTHROPLASTY Right 3/2/2020    Procedure: RIGHT TOTAL HIP ARTHROPLASTY NATALY NAVIGATION;  Surgeon: Luis M Leonard MD;  Location: [facility] OR;  Service: Orthopedics;  Laterality: Right;   • TOTAL HIP  ARTHROPLASTY Left 7/20/2021    Procedure: Posterior LEFT TOTAL HIP ARTHROPLASTY NATALY NAVIGATION;  Surgeon: Luis M Leonard MD;  Location: [facility] MAIN OR;  Service: Orthopedics;  Laterality: Left;   • VENOUS ACCESS DEVICE (PORT) INSERTION Right 2/1/2019    Procedure: INSERTION VENOUS ACCESS DEVICE;  Surgeon: Joby Barron Jr., MD;  Location: [facility] OR [facility];  Service: General   • VENOUS ACCESS DEVICE (PORT) REMOVAL N/A 10/30/2019    Procedure: Mediport Removal;  Surgeon: Joby Barron Jr., MD;  Location: [facility] MAIN OR;  Service: General        Social History     Occupational History   • Occupation:      Employer: SELF-EMPLOYED   Tobacco Use   • Smoking status: Never Smoker   • Smokeless tobacco: Never Used   Vaping Use   • Vaping Use: Never used   Substance and Sexual Activity   • Alcohol use: Not Currently     Alcohol/week: 7.0 standard drinks     Types: 4 Glasses of wine, 3 Cans of beer per week     Comment: 2 CUPS DAILY   • Drug use: No   • Sexual activity: Not Currently     Partners: Male     Birth control/protection: Post-menopausal      Social History     Social History Narrative   • Not on file        Family History   Problem Relation Age of Onset   • Lung cancer Father    • Irritable bowel syndrome Father    • Cancer Mother    • Breast cancer Mother    • Liver disease Mother    • Breast cancer Sister 48   • Breast cancer Maternal Grandmother    • Breast cancer Paternal Grandmother    • Breast cancer Maternal Uncle    • Malig Hyperthermia Neg Hx        ROS: 14 point review of systems was performed and all other systems were reviewed and are negative except for documented findings in HPI and today's encounter.     Allergies:   Allergies   Allergen Reactions   • Benadryl [Diphenhydramine] Itching     INCREASES BLOOD PRESSURE   • Erythromycin GI Intolerance   • Levaquin [Levofloxacin] GI Intolerance   • Penicillins GI Intolerance     Constitutional:  Denies fever, shaking or chills   Eyes:   "Denies change in visual acuity   HENT:  Denies nasal congestion or sore throat   Respiratory:  Denies cough or shortness of breath   Cardiovascular:  Denies chest pain or severe LE edema   GI:  Denies abdominal pain, nausea, vomiting, bloody stools or diarrhea   Musculoskeletal:  Numbness, tingling, pain, or loss of motor function only as noted above in history of present illness.  : Denies painful urination or hematuria  Integument:  Denies rash, lesion or ulceration   Neurologic:  Denies headache or focal weakness  Endocrine:  Denies lymphadenopathy  Psych:  Denies confusion or change in mental status   Hem:  Denies active bleeding    OBJECTIVE:  Physical Exam: 61 y.o. female  Wt Readings from Last 3 Encounters:   11/03/21 71.2 kg (157 lb)   11/01/21 71.2 kg (157 lb)   10/27/21 71.4 kg (157 lb 4.8 oz)     Ht Readings from Last 1 Encounters:   11/03/21 167.6 cm (66\")     Body mass index is 25.34 kg/m².  Vitals:    11/03/21 1354   Temp: 96.6 °F (35.9 °C)     Vital signs reviewed.     General Appearance:    Alert, cooperative, in no acute distress                  Eyes: conjunctiva clear  ENT: external ears and nose atraumatic  CV: no peripheral edema  Resp: normal respiratory effort  Skin: no rashes or wounds; normal turgor  Psych: mood and affect appropriate  Lymph: no nodes appreciated  Neuro: gross sensation intact  Vascular:  Palpable peripheral pulse in noted extremity  Musculoskeletal Extremities: She is able to walk really no pain no limp and that she get in and out of chair with ease    Radiology:   AP of the hip in the epic system and AP lateral right hip taken post reduction show prereduction dislocation and postreduction concentric reduction.        Assessment:     ICD-10-CM ICD-9-CM   1. Status post total replacement of both hips  Z96.643 V43.64   2. Closed dislocation of hip, sequela  S73.006S 905.6        MDM/Plan:   The diagnosis(es), natural history, pathophysiology and treatment for diagnosis(es) " were discussed. Opportunity given and questions answered.  Biomechanics of pertinent body areas discussed.  When appropriate, the use of ambulatory aids discussed.    Plan I went over the safe positions of the hip I think she overstepped and I told her that she goes up and down that step which she says she does a lot she probably needs get an extra step in the between because sounds like she went above 90 degrees when she put her full body weight on probably internally rotated also explained to her there could be deformity in the polyethylene and should recurrent dislocation problems arise may necessitate a constrained liner in her hip as her hip was very stable at the time of surgery reviewing the notes and she been riding horses and doing well up told her to really watch a safe position of the hip especially for the next couple months and the do's and don'ts.  Number postop total joint recommendations and this point we will see her back she is having problems      11/3/2021    Dictated utilizing Dragon dictation

## 2021-11-04 ENCOUNTER — TELEPHONE (OUTPATIENT)
Dept: ONCOLOGY | Facility: CLINIC | Age: 61
End: 2021-11-04

## 2021-11-04 NOTE — TELEPHONE ENCOUNTER
Result Notes    Component    Case Report   Surgical Pathology Report                         Case: [id number]                                   Authorizing Provider:  Elie Dixon MD        Collected:           11/01/2021 08:06 AM           Ordering Location:     [facility]  Received:            11/01/2021 08:59 AM                                  CT                                                                            Pathologist:           Berny Nichols MD                                                          Specimen:    Liver, liver bx                                                                            Clinical Information    Elevated liver enzymes   Final Diagnosis   1.  Liver, Biopsy:  Benign liver parenchyma with               A.  Lipofuscin.               B.  Mild macrovesicular steatosis.               C.  Several eosinophils in the portal areas.               D.  Lobular inflammation.               E.  Portal inflammation with lymphocytes, eosinophils and plasma cells.                F.  Focal cholangitis.                 G.  Rare collections of histiocytes suggestive of poorly formed granuloma.      vini/brb   Electronically signed by Berny Nichols MD on 11/2/2021 at 1315   Comment    There is inflammation present in the lobule and the portal areas with some cholangitis.  Given the involvement of the lobule as well as the bile ducts and the presence of eosinophils a drug reaction should be considered.  Additionally, given the presence of plasma cells, an autoimmune disorder should also be considered.  The presence of the possible granuloma would support a drug reaction.  An AFB and GMS stains were performed to exclude any organisms and no acid fast bacilli or fungal organisms are identified.  Metastatic disease is not identified.  Given the clinical history, as well as the numerous eosinophils, a drug reaction is favored.     vini/brb   Gross Description    1.   "Received in formalin labeled with the patient's name and designated \"liver biopsy\" is a single tan core fragment of soft tissue measuring 2.0 x 0.1 cm in maximal tubal diameter.  The tissue is submitted in 1A.  Total blocks this case:  1     mb/uso/aubreem/juana    Special Stains    Iron and trichrome stains are performed. The controls stain appropriately.  Iron is negative.  The trichrome reveals no significant fibrosis.     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 The presence of plasma cells, inflammation and eosinophils is very suggestive of this process to him. Given the circumstances I instructed my nurse to discuss this with the patient and advised her to discontinue the Femara and put her on prednisone 10 mg a day. The patient will be reviewed by me next week and she also will be reviewed by Dar BURK MD.     The patient is not taking any other medicine that will trigger liver dysfunction as far as we know. She is no longer taking Kisqali that could be associated with hepatitis. I wonder if what we see is just residual effect of Kisqali more than Femara. This will be further discussed.       "

## 2021-11-10 ENCOUNTER — LAB (OUTPATIENT)
Dept: LAB | Facility: HOSPITAL | Age: 61
End: 2021-11-10

## 2021-11-10 ENCOUNTER — IMMUNIZATION (OUTPATIENT)
Dept: VACCINE CLINIC | Facility: HOSPITAL | Age: 61
End: 2021-11-10

## 2021-11-10 ENCOUNTER — CLINICAL SUPPORT (OUTPATIENT)
Dept: ONCOLOGY | Facility: HOSPITAL | Age: 61
End: 2021-11-10

## 2021-11-10 DIAGNOSIS — C50.812 MALIGNANT NEOPLASM OF OVERLAPPING SITES OF LEFT BREAST IN FEMALE, ESTROGEN RECEPTOR POSITIVE (HCC): ICD-10-CM

## 2021-11-10 DIAGNOSIS — R74.8 ELEVATED LIVER ENZYMES: ICD-10-CM

## 2021-11-10 DIAGNOSIS — Z17.0 MALIGNANT NEOPLASM OF OVERLAPPING SITES OF LEFT BREAST IN FEMALE, ESTROGEN RECEPTOR POSITIVE (HCC): ICD-10-CM

## 2021-11-10 LAB
ALBUMIN SERPL-MCNC: 4.3 G/DL (ref 3.5–5.2)
ALBUMIN/GLOB SERPL: 2 G/DL (ref 1.1–2.4)
ALP SERPL-CCNC: 158 U/L (ref 38–116)
ALT SERPL W P-5'-P-CCNC: 464 U/L (ref 0–33)
ANION GAP SERPL CALCULATED.3IONS-SCNC: 9.6 MMOL/L (ref 5–15)
AST SERPL-CCNC: 183 U/L (ref 0–32)
BILIRUB SERPL-MCNC: 0.3 MG/DL (ref 0.2–1.2)
BUN SERPL-MCNC: 23 MG/DL (ref 6–20)
BUN/CREAT SERPL: 25.6 (ref 7.3–30)
CALCIUM SPEC-SCNC: 9.4 MG/DL (ref 8.5–10.2)
CHLORIDE SERPL-SCNC: 105 MMOL/L (ref 98–107)
CO2 SERPL-SCNC: 24.4 MMOL/L (ref 22–29)
CREAT SERPL-MCNC: 0.9 MG/DL (ref 0.6–1.1)
GFR SERPL CREATININE-BSD FRML MDRD: 63 ML/MIN/1.73
GLOBULIN UR ELPH-MCNC: 2.2 GM/DL (ref 1.8–3.5)
GLUCOSE SERPL-MCNC: 78 MG/DL (ref 74–124)
POTASSIUM SERPL-SCNC: 3.8 MMOL/L (ref 3.5–4.7)
PROT SERPL-MCNC: 6.5 G/DL (ref 6.3–8)
SODIUM SERPL-SCNC: 139 MMOL/L (ref 134–145)

## 2021-11-10 PROCEDURE — 91300 HC SARSCOV02 VAC 30MCG/0.3ML IM: CPT | Performed by: INTERNAL MEDICINE

## 2021-11-10 PROCEDURE — 0004A ADM SARSCOV2 30MCG/0.3ML BOOSTER: CPT | Performed by: INTERNAL MEDICINE

## 2021-11-10 PROCEDURE — 80053 COMPREHEN METABOLIC PANEL: CPT

## 2021-11-10 PROCEDURE — 36415 COLL VENOUS BLD VENIPUNCTURE: CPT

## 2021-11-10 NOTE — NURSING NOTE
Called patient with CMP results. AST and ALT both still elevated but trended downward. Reviewed these results with the patient and went over future appointments. Pt v/u.

## 2021-11-19 ENCOUNTER — OFFICE VISIT (OUTPATIENT)
Dept: ONCOLOGY | Facility: CLINIC | Age: 61
End: 2021-11-19

## 2021-11-19 ENCOUNTER — LAB (OUTPATIENT)
Dept: LAB | Facility: HOSPITAL | Age: 61
End: 2021-11-19

## 2021-11-19 ENCOUNTER — TELEPHONE (OUTPATIENT)
Dept: ONCOLOGY | Facility: CLINIC | Age: 61
End: 2021-11-19

## 2021-11-19 VITALS
WEIGHT: 157.2 LBS | HEIGHT: 66 IN | DIASTOLIC BLOOD PRESSURE: 79 MMHG | RESPIRATION RATE: 20 BRPM | OXYGEN SATURATION: 99 % | TEMPERATURE: 97.5 F | HEART RATE: 56 BPM | BODY MASS INDEX: 25.26 KG/M2 | SYSTOLIC BLOOD PRESSURE: 125 MMHG

## 2021-11-19 DIAGNOSIS — Z17.0 MALIGNANT NEOPLASM OF OVERLAPPING SITES OF LEFT BREAST IN FEMALE, ESTROGEN RECEPTOR POSITIVE (HCC): Primary | ICD-10-CM

## 2021-11-19 DIAGNOSIS — Z17.0 MALIGNANT NEOPLASM OF OVERLAPPING SITES OF LEFT BREAST IN FEMALE, ESTROGEN RECEPTOR POSITIVE (HCC): ICD-10-CM

## 2021-11-19 DIAGNOSIS — C50.812 MALIGNANT NEOPLASM OF OVERLAPPING SITES OF LEFT BREAST IN FEMALE, ESTROGEN RECEPTOR POSITIVE (HCC): ICD-10-CM

## 2021-11-19 DIAGNOSIS — C50.812 MALIGNANT NEOPLASM OF OVERLAPPING SITES OF BOTH BREASTS IN FEMALE, ESTROGEN RECEPTOR POSITIVE (HCC): ICD-10-CM

## 2021-11-19 DIAGNOSIS — T50.905A DRUG-INDUCED HEPATITIS: ICD-10-CM

## 2021-11-19 DIAGNOSIS — C50.812 MALIGNANT NEOPLASM OF OVERLAPPING SITES OF LEFT BREAST IN FEMALE, ESTROGEN RECEPTOR POSITIVE (HCC): Primary | ICD-10-CM

## 2021-11-19 DIAGNOSIS — Z17.0 MALIGNANT NEOPLASM OF OVERLAPPING SITES OF BOTH BREASTS IN FEMALE, ESTROGEN RECEPTOR POSITIVE (HCC): ICD-10-CM

## 2021-11-19 DIAGNOSIS — K71.6 DRUG-INDUCED HEPATITIS: ICD-10-CM

## 2021-11-19 DIAGNOSIS — C50.811 MALIGNANT NEOPLASM OF OVERLAPPING SITES OF BOTH BREASTS IN FEMALE, ESTROGEN RECEPTOR POSITIVE (HCC): ICD-10-CM

## 2021-11-19 DIAGNOSIS — M16.12 ARTHRITIS OF LEFT HIP: ICD-10-CM

## 2021-11-19 DIAGNOSIS — I10 ESSENTIAL HYPERTENSION: ICD-10-CM

## 2021-11-19 LAB
ALBUMIN SERPL-MCNC: 4.6 G/DL (ref 3.5–5.2)
ALBUMIN/GLOB SERPL: 1.7 G/DL (ref 1.1–2.4)
ALP SERPL-CCNC: 157 U/L (ref 38–116)
ALT SERPL W P-5'-P-CCNC: 442 U/L (ref 0–33)
ANION GAP SERPL CALCULATED.3IONS-SCNC: 10.9 MMOL/L (ref 5–15)
AST SERPL-CCNC: 186 U/L (ref 0–32)
BASOPHILS # BLD AUTO: 0.04 10*3/MM3 (ref 0–0.2)
BASOPHILS NFR BLD AUTO: 0.7 % (ref 0–1.5)
BILIRUB SERPL-MCNC: 0.5 MG/DL (ref 0.2–1.2)
BUN SERPL-MCNC: 22 MG/DL (ref 6–20)
BUN/CREAT SERPL: 25.9 (ref 7.3–30)
CALCIUM SPEC-SCNC: 9.9 MG/DL (ref 8.5–10.2)
CANCER AG15-3 SERPL-ACNC: 20.1 U/ML
CHLORIDE SERPL-SCNC: 102 MMOL/L (ref 98–107)
CO2 SERPL-SCNC: 26.1 MMOL/L (ref 22–29)
CREAT SERPL-MCNC: 0.85 MG/DL (ref 0.6–1.1)
DEPRECATED RDW RBC AUTO: 55.8 FL (ref 37–54)
EOSINOPHIL # BLD AUTO: 0.02 10*3/MM3 (ref 0–0.4)
EOSINOPHIL NFR BLD AUTO: 0.4 % (ref 0.3–6.2)
ERYTHROCYTE [DISTWIDTH] IN BLOOD BY AUTOMATED COUNT: 16 % (ref 12.3–15.4)
GFR SERPL CREATININE-BSD FRML MDRD: 68 ML/MIN/1.73
GLOBULIN UR ELPH-MCNC: 2.7 GM/DL (ref 1.8–3.5)
GLUCOSE SERPL-MCNC: 131 MG/DL (ref 74–124)
HCT VFR BLD AUTO: 41.9 % (ref 34–46.6)
HGB BLD-MCNC: 13.2 G/DL (ref 12–15.9)
IMM GRANULOCYTES # BLD AUTO: 0.01 10*3/MM3 (ref 0–0.05)
IMM GRANULOCYTES NFR BLD AUTO: 0.2 % (ref 0–0.5)
LYMPHOCYTES # BLD AUTO: 0.81 10*3/MM3 (ref 0.7–3.1)
LYMPHOCYTES NFR BLD AUTO: 14.4 % (ref 19.6–45.3)
MCH RBC QN AUTO: 29.8 PG (ref 26.6–33)
MCHC RBC AUTO-ENTMCNC: 31.5 G/DL (ref 31.5–35.7)
MCV RBC AUTO: 94.6 FL (ref 79–97)
MONOCYTES # BLD AUTO: 0.24 10*3/MM3 (ref 0.1–0.9)
MONOCYTES NFR BLD AUTO: 4.3 % (ref 5–12)
NEUTROPHILS NFR BLD AUTO: 4.51 10*3/MM3 (ref 1.7–7)
NEUTROPHILS NFR BLD AUTO: 80 % (ref 42.7–76)
NRBC BLD AUTO-RTO: 0 /100 WBC (ref 0–0.2)
PLATELET # BLD AUTO: 198 10*3/MM3 (ref 140–450)
PMV BLD AUTO: 8.2 FL (ref 6–12)
POTASSIUM SERPL-SCNC: 4.3 MMOL/L (ref 3.5–4.7)
PROT SERPL-MCNC: 7.3 G/DL (ref 6.3–8)
RBC # BLD AUTO: 4.43 10*6/MM3 (ref 3.77–5.28)
SODIUM SERPL-SCNC: 139 MMOL/L (ref 134–145)
WBC NRBC COR # BLD: 5.63 10*3/MM3 (ref 3.4–10.8)

## 2021-11-19 PROCEDURE — 99215 OFFICE O/P EST HI 40 MIN: CPT | Performed by: INTERNAL MEDICINE

## 2021-11-19 PROCEDURE — 86300 IMMUNOASSAY TUMOR CA 15-3: CPT | Performed by: INTERNAL MEDICINE

## 2021-11-19 PROCEDURE — 80053 COMPREHEN METABOLIC PANEL: CPT

## 2021-11-19 PROCEDURE — 36415 COLL VENOUS BLD VENIPUNCTURE: CPT

## 2021-11-19 PROCEDURE — 85025 COMPLETE CBC W/AUTO DIFF WBC: CPT

## 2021-11-19 NOTE — PROGRESS NOTES
Subjective     REASON FOR FOLLOW UP:  1. 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 R BREAST MASS WITH GIANT R AXILLARY ADENOPATHY.  SHE UNDERWENT DOSE DENSE AC, TAXOL, BILATERAL MASTECTOMIES, DRAMATIC NEAR COMPLETE SC, RADIATION THERAPY AND ADJUVANT FEMARA.    3. FAMILY HISTORY OF BREAST CANCER IN MOTHER AND SISTER, SISTER WAS TESTED FOR BRCA AND WAS NEGATIVE.    4. LEFT OVARIAN CYST , IT HAS BEEN PRESENT FOR LONG TIME BUT IS GETTING LARGER, ASYMPTOMATIC, NEED FOR , PELVIC US AND GYN ONC EVEAL                   5. LEFT HIP PAIN SEVERE ARTHRITIS, REAL NEED FOR LEFT HIP REPLACEMENT: PERFORMED 8/21    6. 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      History of Present Illness       DURING THE VISIT WITH THE PATIENT TODAY , PATIENT HAD FACE MASK, MY MEDICAL ASSISTANT AND I  HAD PROPPER PROTECTIVE EQUIPMENT, AND I DID HAND HYGIENE WITH SOAP AND WATER BEFORE AND AFTER THE VISIT.      This patient returns today to the office in company of her brother who lives in [place] in order to review recent events that have happened including significant persistent elevation of her liver function test presumed to be related to Kisqali utilization. In spite of not receiving this medicine for almost 2 months the liver function test abnormality remains ongoing and I decided to proceed with laboratory evaluation that will be described below as well as referral to Dar BURK MD, who has seen the patient. I also requested for her to have a liver biopsy that will be described below. In the meantime the patient actually feels very well. She has been worried about all of the issues pertinent to her liver disease given the fact that her mother had primary biliary cirrhosis. We have discontinued a lot of medicines and we have been sure that she is not drinking any alcohol that she is not and in spite of that her liver function test abnormality remains  ongoing. We even went ahead and discontinued the Femara a couple of weeks ago after reviewing data that suggests that Femara produces liver dysfunction up to 0.1% of the patients taking it, something that is very remote. She has been tested for hepatitis A, B and C as well being negative.     In any event she had an accidental fall one day at home. She dislocated her replaced right hip and she required replacement of this and she had no difficulties thereafter. Her appetite is excellent, her weight is stable, her bowel function is normal, she has no jaundice, she has no pruritus, she has no rash. Urination and defecation are normal. She is taking her blood pressure medication. Also a few days ago I put her on prednisone hopefully after reviewing the liver biopsy that a drug effect will be controlled with some prednisone. It seems to be that that is not the case.             Past Medical History:   Diagnosis Date   • Anemia in neoplastic disease    • Arthritis    • Breast cancer (HCC)     Left   • CVA (cerebral vascular accident) (HCC)    • Hip pain     RIGHT HIP... CYST   • History of fracture of leg 1987   • History of radiation therapy     LAST TREATMENT     • Hypertension    • Limited joint range of motion (ROM)     RIGHT HIP   • Skin sore     OPEN SORE LEFT BREAST   • Syncope    • Vertigo            Past Surgical History:   Procedure Laterality Date   • AXILLARY LYMPH NODE BIOPSY/EXCISION Right     LYMPH NODE UNDER RIGHT ARM-MALIGNANT (DOUBLE MASTECTOMY)   • BREAST BIOPSY Left     MALIGNANT   • FRACTURE SURGERY  1987    Leg   • HARDWARE REMOVAL     • MASTECTOMY W/ SENTINEL NODE BIOPSY Bilateral 9/16/2019    Procedure: BILATERLA MODIFIED RADICAL MASTECTOMY WITH BILATERAL SENTINEL LYMPH NODE BIOPSY;  Surgeon: Joby Barron Jr., MD;  Location: [facility];  Service: General   • TOTAL HIP ARTHROPLASTY Right 3/2/2020    Procedure: RIGHT TOTAL HIP ARTHROPLASTY NATALY NAVIGATION;  Surgeon: Luis M Leonard  "MD;  Location: [facility] OR;  Service: Orthopedics;  Laterality: Right;   • TOTAL HIP ARTHROPLASTY Left 7/20/2021    Procedure: Posterior LEFT TOTAL HIP ARTHROPLASTY NATALY NAVIGATION;  Surgeon: Luis M Leonard MD;  Location: [facility] OR;  Service: Orthopedics;  Laterality: Left;   • VENOUS ACCESS DEVICE (PORT) INSERTION Right 2/1/2019    Procedure: INSERTION VENOUS ACCESS DEVICE;  Surgeon: Joby Barron Jr., MD;  Location: [facility];  Service: General   • VENOUS ACCESS DEVICE (PORT) REMOVAL N/A 10/30/2019    Procedure: Mediport Removal;  Surgeon: Joby Barron Jr., MD;  Location: [facility] OR;  Service: General        Current Outpatient Medications on File Prior to Visit   Medication Sig Dispense Refill   • Cholecalciferol 250 MCG (83291 UT) tablet Take 1 tablet by mouth. Patient stated dose as \"1500 mg\"     • magnesium oxide (MAG-OX) 400 MG tablet Take 400 mg by mouth 2 (two) times a day. Patient stated dose as \"1500mg\"     • meclizine (ANTIVERT) 25 MG tablet Take 1 tablet by mouth 3 (Three) Times a Day As Needed for Dizziness. 90 tablet 0   • metoprolol succinate XL (TOPROL-XL) 25 MG 24 hr tablet Take 1 tablet by mouth Daily. 30 tablet 2   • predniSONE (DELTASONE) 10 MG tablet Take 1 tablet by mouth Daily. 30 tablet 0   • [DISCONTINUED] diclofenac (VOLTAREN) 75 MG EC tablet TAKE 1 TABLET BY MOUTH TWICE DAILY 60 tablet 3   • [DISCONTINUED] loperamide (IMODIUM) 2 MG capsule Take 2 mg by mouth 4 (Four) Times a Day As Needed for Diarrhea.     • acetaminophen (TYLENOL) 650 MG 8 hr tablet Take 1,950 mg by mouth Every 8 (Eight) Hours As Needed.     • letrozole (FEMARA) 2.5 MG tablet Take 1 tablet by mouth Daily. 90 tablet 2   • [DISCONTINUED] APPLE CIDER VINEGAR PO Take 1 tablet/day by mouth Daily.     • [DISCONTINUED] Chromium 200 MCG tablet Take 1 tablet by mouth. Patient was unable to state dose     • [DISCONTINUED] NON FORMULARY Take 1 tablet by mouth Daily. TART CHERRY JUICE EXTRACT CAP     • " "[DISCONTINUED] ribociclib succinate (Kisqali, 600 MG Dose,) 200 MG tablet therapy pack tablet Take 600 mg by mouth Daily. Take for 21 days and then off 7 days       Current Facility-Administered Medications on File Prior to Visit   Medication Dose Route Frequency Provider Last Rate Last Admin   • Chlorhexidine Gluconate Cloth 2 % pads 1 each  1 each Apply externally Take As Directed Luis M Leonard MD            ALLERGIES:    Allergies   Allergen Reactions   • Benadryl [Diphenhydramine] Itching     INCREASES BLOOD PRESSURE   • Erythromycin GI Intolerance   • Levaquin [Levofloxacin] GI Intolerance   • Penicillins GI Intolerance        Social History     Socioeconomic History   • Marital status:      Spouse name: Cruz   • Number of children: 0   Tobacco Use   • Smoking status: Never Smoker   • Smokeless tobacco: Never Used   Vaping Use   • Vaping Use: Never used   Substance and Sexual Activity   • Alcohol use: Not Currently     Alcohol/week: 7.0 standard drinks     Types: 4 Glasses of wine, 3 Cans of beer per week     Comment: 2 CUPS DAILY   • Drug use: No   • Sexual activity: Not Currently     Partners: Male     Birth control/protection: Post-menopausal        Family History   Problem Relation Age of Onset   • Lung cancer Father    • Irritable bowel syndrome Father    • Cancer Mother    • Breast cancer Mother    • Liver disease Mother    • Breast cancer Sister 48   • Breast cancer Maternal Grandmother    • Breast cancer Paternal Grandmother    • Breast cancer Maternal Uncle    • Malig Hyperthermia Neg Hx             Objective     Vitals:    11/19/21 0935   BP: 125/79   Pulse: 56   Resp: 20   Temp: 97.5 °F (36.4 °C)   TempSrc: Oral   SpO2: 99%   Weight: 71.3 kg (157 lb 3.2 oz)   Height: 167.6 cm (66\")   PainSc: 0-No pain     Current Status 9/30/2021   ECOG score 0     Exam:     I HAVE PERSONALLY REVIEWED THE HISTORY OF THE PRESENT ILLNESS, PAST MEDICAL HISTORY, FAMILY HISTORY, SOCIAL HISTORY, ALLERGIES, " MEDICATIONS STATED ABOVE IN THE  NOTE FROM TODAY.        GENERAL:  Well-developed, well-nourished  Patient  in no acute distress.   SKIN:  Warm, dry ,NO rashes,NO purpura ,NO petechiae.  HEENT:  Pupils were equal and reactive to light and accomodation, conjunctivae noninjected, no pterygium, normal extraocular movements, normal visual acuity.   NECK:  Supple with good range of motion; no thyromegaly , no other masses, no JVD or bruits, no cervical adenopathies.No carotid artery pain, no carotid abnormal pulsation , NO arterial dance.  LYMPHATICS:  No cervical, NO supraclavicular, NO axillary,NO epitrochlear , NO inguinal adenopathy.  CARDIAC   normal rate and regular rhythm, without murmur,NO rubs NO S3 NO S4 right or left .  LUNGS: normal breath sounds bilateral, no wheezing, rhonchi, crackles or rubs.  CHEST WALL: BILATERAL MASTECTOMIES NO NODULES OR MASSES.  VASCULAR VENOUS: no cyanosis, collateral circulation, varicosities, edema, palpable cords, pain, erythema.  ABDOMEN:  Soft, nontender with no hepatomegaly, no splenomegaly,no masses, no ascites, no collateral circulation,no distention,no [place] sign.  EXTREMITIES  AND SPINE:  No clubbing, cyanosis or edema, OA HANDS deformities , no pain .No kyphosis, scoliosis, no other deformities, no pain in spine, no pain in ribs , no pain inpelvic bone.  NEUROLOGICAL:  Patient was awake, alert, oriented to time, person and place.            RECENT LABS:  Hematology WBC   Date Value Ref Range Status   11/19/2021 5.63 3.40 - 10.80 10*3/mm3 Final     RBC   Date Value Ref Range Status   11/19/2021 4.43 3.77 - 5.28 10*6/mm3 Final     Hemoglobin   Date Value Ref Range Status   11/19/2021 13.2 12.0 - 15.9 g/dL Final     Hematocrit   Date Value Ref Range Status   11/19/2021 41.9 34.0 - 46.6 % Final     Platelets   Date Value Ref Range Status   11/19/2021 198 140 - 450 10*3/mm3 Final          CT CHEST AND ABDOMEN WITH IV CONTRAST     HISTORY: 61-year-old female with metastatic  breast cancer. Bilateral  mastectomies. Follow-up. Evaluate for metastatic disease.     TECHNIQUE: Radiation dose reduction techniques were utilized, including  automated exposure control and exposure modulation based on body size.   3 mm images were obtained through the chest and abdomen after the  administration of IV contrast. Compared with PET/CT 05/19/2021 and  previous CTs 05/10/2021.     FINDINGS: There is no interval change in a few sub-6 mm nodular  opacities and a very small peripheral ground-glass opacity at the left  upper lobe is stable. A focus of pleural thickening at the right major  fissure is stable. There are no new or suspicious opacities and there  are no pleural or pericardial effusions. There is no lymphadenopathy  within the chest. There has been near complete resolution of the  hypermetabolic epicardial fat pad node which is mostly linear in  appearance measuring 1.0 x 0.4 cm series 2 image 70. There is no  lymphadenopathy at the axilla or subpectoral regions. There is no change  in the appearance of the anterior chest wall bilaterally. The liver,  gallbladder, adrenals, spleen, pancreas, and kidneys appear  unremarkable. Visualized bowel appears unremarkable. There is a very  small umbilical hernia containing fat. No suspicious bone lesion is  seen.     IMPRESSION:  1. Near-complete resolution of the hypermetabolic epicardial fat pad  node. A few tiny nodular opacities are stable and there is no new  abnormality.  2. There is no evidence for metastatic disease within the abdomen.     This report was finalized on 10/8/2021 3:59 PM by Dr. Sarah Magaña M.D.         Tissue Pathology Exam: [id number]  Order: [id number]   Status: Final result     Visible to patient: Yes (seen)     Next appt: 12/03/2021 at 10:30 AM in Lab (LAB CHAIR 2 [facility])     Dx: Elevated liver enzymes    Specimen Information: Liver; Tissue         0 Result Notes    Component    Case Report   Surgical Pathology Report                          Case: [id number]                                   Authorizing Provider:  Elie Dixon MD        Collected:           11/01/2021 08:06 AM           Ordering Location:     [facility]  Received:            11/01/2021 08:59 AM                                  CT                                                                            Pathologist:           Berny Nichols MD                                                          Specimen:    Liver, liver bx                                                                            Clinical Information    Elevated liver enzymes   Final Diagnosis   1.  Liver, Biopsy:  Benign liver parenchyma with               A.  Lipofuscin.               B.  Mild macrovesicular steatosis.               C.  Several eosinophils in the portal areas.               D.  Lobular inflammation.               E.  Portal inflammation with lymphocytes, eosinophils and plasma cells.                F.  Focal cholangitis.                 G.  Rare collections of histiocytes suggestive of poorly formed granuloma.      vini/brb   Electronically signed by Berny Nichols MD on 11/2/2021 at 1315   Comment    There is inflammation present in the lobule and the portal areas with some cholangitis.  Given the involvement of the lobule as well as the bile ducts and the presence of eosinophils a drug reaction should be considered.  Additionally, given the presence of plasma cells, an autoimmune disorder should also be considered.  The presence of the possible granuloma would support a drug reaction.  An AFB and GMS stains were performed to exclude any organisms and no acid fast bacilli or fungal organisms are identified.  Metastatic disease is not identified.  Given the clinical history, as well as the numerous eosinophils, a drug reaction is favored.     vini/brb            0 Result Notes    Component   Ref Range & Units 1 mo ago   ALPHA -1 ANTITRYPSIN   90 - 200  mg/dL 144         View Full Report             Related Result Highlights     Gamma GT  Final result 10/14/2021             Ceruloplasmin  Final result 10/14/2021                 Result Care Coordination      Patient Communication    Add Comments  Seen Back to Top                                 AARON Comprehensive Panel  Order: [id number]   Collected 10/14/2021 11:10      Specimen Information: Blood         0 Result Notes      Component   Ref Range & Units 1 mo ago   Anti-DNA (DS) Ab Qn   0 - 9 IU/mL 1    Comment:                                    Negative      <5                                      Equivocal  5 - 9                                      Positive      >9   RNP Antibodies   0.0 - 0.9 AI <0.2    Morris Antibodies   0.0 - 0.9 AI <0.2    Antiscleroderma-70 Antibodies   0.0 - 0.9 AI <0.2    NICHO SSA (RO) Ab   0.0 - 0.9 AI <0.2    NICHO SSB (LA) Ab   0.0 - 0.9 AI <0.2    Antichromatin Antibodies   0.0 - 0.9 AI <0.2    GABI-1 IgG   0.0 - 0.9 AI <0.2    Anti-Centromere B Antibodies   0.0 - 0.9 AI <0.2    See below:  Comment    Comment: Autoantibody                       Disease Association   ------------------------------------------------------------                           Condition                  Frequency   ---------------------   ------------------------   ---------   Antinuclear Antibody,    SLE, mixed connective   Direct (AARON-D)           tissue diseases   ---------------------   ------------------------   ---------   dsDNA                    SLE                        40 - 60%   ---------------------   ------------------------   ---------   Chromatin                Drug induced SLE                90%                            SLE                        48 - 97%   ---------------------   ------------------------   ---------   SSA (Ro)                 SLE                        25 - 35%                            Sjogren's Syndrome         40 - 70%                             Lupus                  100%   ---------------------   ------------------------   ---------   SSB (La)                 SLE                             10%                            Sjogren's Syndrome              30%   ---------------------   -----------------------    ---------   Sm (anti-Smith)          SLE                        15 - 30%   ---------------------   -----------------------    ---------   RNP                      Mixed Connective Tissue                            Disease                         95%   (U1 nRNP,                SLE                        30 - 50%   anti-ribonucleoprotein)  Polymyositis and/or                            Dermatomyositis                 20%   ---------------------   ------------------------   ---------   Scl-70 (antiDNA          Scleroderma (diffuse)      20 - 35%   topoisomerase)           Crest                           13%   ---------------------   ------------------------   ---------   Devika-1                     Polymyositis and/or                            Dermatomyositis            20 - 40%   ---------------------   ------------------------   ---------   Centromere B             Scleroderma - Crest                            variant                         80%          View Full Report       Narrative    Performed at:  [address]  [id number]   : Nicholas Warner PhD, Phone:  [phone]           Result Care Coordination      Patient Communication    Add Comments  Seen Back to Top                                    Contains abnormal data Ferritin  Order: [id number]   Collected 10/14/2021 11:10      Specimen Information: Blood         0 Result Notes      Component   Ref Range & Units 1 mo ago   Ferritin   13.00 - 150.00 ng/mL 308.10 High            View Full Report       Narrative    Results may be falsely decreased if patient taking Biotin.            Related Result Highlights     Iron Profile  Final result 10/14/2021                 Result  Care Coordination      Patient Communication    Add Comments  Seen Back to Top                                   Iron Profile  Order: [id number]   Collected 10/14/2021 11:10      Specimen Information: Blood         0 Result Notes      Component   Ref Range & Units 1 mo ago   Iron   37 - 145 mcg/dL 56    Iron Saturation   14 - 48 % 16    Transferrin   200 - 360 mg/dL 248    TIBC   249 - 505 mcg/dL 347           View Full Report             Related Result Highlights     Ferritin  Final result 10/14/2021                 Result Care Coordination      Patient Communication    Add Comments  Seen Back to Top                                   Anti-Smooth Muscle Antibody Titer  Order: [id number]   Collected 10/14/2021 11:10      Specimen Information: Blood         0 Result Notes      Component   Ref Range & Units 1 mo ago   Smooth Muscle Ab   0 - 19 Units 2    Comment:                  Negative                     0 - 19                    Weak positive               20 - 30                    Moderate to strong positive     >30    Actin Antibodies are found in 52-85% of patients with    autoimmune hepatitis or chronic active hepatitis and    in 22% of patients with primary biliary cirrhosis.          View Full Report       Narrative    Performed at:  [address] [place] [address]  [id number]   : Nicholas Warner PhD, Phone:  [phone]           Related Result Highlights     Tissue Pathology Exam  Final result 11/1/2021   Final Diagnosis   1.  Liver, Biopsy:  Benign liver parenchyma with               A.  Lipofuscin.               B.  Mild macrovesicular steatosis.               C.  Several eosinophils in the portal areas.               D.  Lobular inflammation.               E.  Portal inflammation with lymphocytes, eosinophils and plasma cells.                F.  Focal cholangitis.                 G.  Rare collections of histiocytes suggestive of poorly formed granuloma.      vini/brb                     Result Care Coordination      Patient Communication    Add Comments  Seen Back to Top                                   Celiac Comprehensive Panel  Order: [id number]   Collected 10/14/2021 11:10      Specimen Information: Blood         0 Result Notes      Component   Ref Range & Units 1 mo ago   Gliadin Deamidated Peptide Ab, IgA   0 - 19 units 3    Comment:                    Negative                   0 - 19                      Weak Positive             20 - 30                      Moderate to Strong Positive   >30   Deaminated Gliadin Ab IgG   0 - 19 units 2    Comment:                    Negative                   0 - 19                      Weak Positive             20 - 30                      Moderate to Strong Positive   >30   Tissue Transglutaminase IgA   0 - 3 U/mL <2    Comment:                               Negative        0 -  3                                 Weak Positive   4 - 10                                 Positive           >10    Tissue Transglutaminase (tTG) has been identified    as the endomysial antigen.  Studies have demonstr-    ated that endomysial IgA antibodies have over 99%    specificity for gluten sensitive enteropathy.   Tissue Transglutaminase IgG   0 - 5 U/mL <2    Comment:                               Negative        0 - 5                                 Weak Positive   6 - 9                                 Positive           >9   Endomysial IgA   Negative Negative    IgA   87 - 352 mg/dL 97           View Full Report       Narrative    Performed at:  [address] [place] [address]  [id number]   : Nicholas Warner PhD, Phone:  [phone]           Result Care Coordination      Patient Communication    Add Comments  Seen Back to Top                                   Gamma GT  Order: [id number]   Collected 10/14/2021 11:10      Specimen Information: Blood         0 Result Notes      Component   Ref Range & Units 1 mo ago   GGT   5 - 36  U/L 34           View Full Report             Related Result Highlights     Alpha - 1 - Antitrypsin  Final result 10/14/2021             Ceruloplasmin  Final result 10/14/2021                 Result Care Coordination      Patient Communication    Add Comments  Seen Back to Top                                   Copper, Serum  Order: [id number]   Collected 10/14/2021 11:10      Specimen Information: Blood         0 Result Notes      Component   Ref Range & Units 1 mo ago   Copper   80 - 158 ug/dL 147    Comment:                                 Detection Limit = 5          View Full Report       Narrative    Test(s) 001586-Copper, Serum or Plasma   was developed and its performance characteristics determined   by [facility]. It has not been cleared or approved by the Food   and Drug Administration.   Performed at:  [address]  [id number]   : Ha Yao MD, Phone:  [phone]           Result Care Coordination      Patient Communication    Add Comments  Seen Back to Top                                   Ceruloplasmin  Order: [id number]   Collected 10/14/2021 11:10      Specimen Information: Blood         0 Result Notes      Component   Ref Range & Units 1 mo ago   Ceruloplasmin   19 - 39 mg/dL 25           View Full Report             Related Result Highlights     Alpha - 1 - Antitrypsin  Final result 10/14/2021             Gamma GT  Final result 10/14/2021                 Result Care Coordination      Patient Communication    Add Comments  Seen Back to Top                                   Anti-microsomal Antibody  Order: [id number]   Collected 10/14/2021 11:10      Specimen Information: Blood         0 Result Notes      Component   Ref Range & Units 1 mo ago   Liver Fraction:   0.0 - 20.0 Units 1.4    Comment:                                 Negative    0.0 - 20.0                                   Equivocal  20.1 - 24.9                                   Positive          >24.9   LKM type 1 antibodies are detected in patients with   autoimmune hepatitis type 2 and in up to 8% of   patients with chronic HCV infection.          View Full Report       Narrative    Performed at:  [address]  [id number]   : Nicholas Warner PhD, Phone:  [phone]           Result Care Coordination      Patient Communication    Add Comments  Seen Back to Top                    Assessment/Plan    1.  History of least for the last 4 years, she has had an in crescendo mass in the left breast that has produced a gigantic tumor that WAS close to 25 cm in size and is replacing most of the anatomy of the left breast. There are areas of ulceration necrosis and bleeding and the mass is fixed to the chest wall. She has abundant amount of collateral circulation in the left anterior chest wall and it caught my attention the lack of any left axillary adenopathy. She has no lymphedema in the left upper extremity. In the right breast while in sitting position, no palpable abnormalities are documented. The right breast skin and nipple are normal. When the patient lies flat, there is a palpable mass at 9 o'clock from the nipple that measures probably 4 cm in size, very indistinct in regard to edges and margins. There was no mobility and no areas of tenderness. She has a giant adenopathy in the right axilla that measures close to 9 cm in size, uniform, no fluctuation, nontender, completely fixed to the axillary wall. There is no compromise of the skin.     After completion of 4 cycles of AC patient has had very dramatic improvement in all sites of disease including right axilla and left breast.    · Completed 4 cycles Adriamycin Cytoxan on 4/12/2019.    · Patient started weekly Taxol treatments on 5/3/2019.  · Completed 12 weekly Taxol treatments on 7/19/2019.  · 9/16/2019 bilateral mastectomy by Dr. Joby Barron with axillary lymph node dissection.  No residual  malignancy.  · 10/30/2019 patient's Mediport was removed in anticipation of radiation.  · Radiation therapy under the care of Dr. Marmolejo 10/31/2019-1/13/2020 to the right chest wall.  · Started on adjuvant Femara 2.5 mg daily 8/20/2019.  · 9/21/2020 continues on adjuvant Femara, tolerating it quite well.  Some mild hot flashes and mild arthralgias, all which are tolerable.  I advised the patient that at this point her breast cancer remains in remission. She is 100% compliant of her medication letrozole and her clinical examination remains negative normal for breast cancer recurrence.   The patient was further reviewed on 04/16/2021. Her clinical examination is completely negative normal and her questioning is negative in regard to symptoms that would suggest breast cancer recurrence. She is 100% compliant with her Femara. Her white count, hemoglobin and platelets remain normal. Her tumor marker during the previous visit was normal and we have another one pending today CA 15-3.   The patient was further reviewed on 05/17/2021 along with her brother. The clinical examination has not changed. The only new complaint is the progressive amount of discomfort and the inability to function given the pain in the left hip area. Now she is limping. The only time when she is not in discomfort with the left hip is when she is sitting or lying in bed or riding the horse when gravity takes away the pressure of her left hip area. Radiologically speaking the CT scan of the chest, abdomen and pelvis to my eyes disclosed no abnormalities besides a very simple ovarian cyst that measures close to 7 x 5 cm with no trabeculation. There is no pelvic ascites. There is no pelvic adenopathy. The other abnormality obviously visible is the severe degree of scoliosis of the thoracic, lumbar spines.     It caught my attention the subchondral cyst in the left hip and the minimal if any space leftover between the head of the femur and the  acetabulum. There is no metastasis in this anatomical site.     The radiologist mentioned cardiophrenic angle lymphadenopathy. I cannot see that from my naked eyes. I do not know what it is and I do not know how to express it. Pulmonary anatomy is normal. Hilar adenopathy is normal. No pericardial effusion. No pleural effusion. Minimal infiltrate in the lungs related to radiation changes. Liver anatomy, pancreas and kidneys were unremarkable. The scoliosis is very obvious in the view of the abdomen.     Her CA 15-3 was minimal elevated in comparison to previous assessment and it raises the following question.   1. Is the cardiophrenic node described by the radiologist indeed significant of breast cancer recurrence or not? The only way to know will be to do a PET scan. This will be scheduled.   2. She is known for having an ovarian cyst for a long time but now is getting bigger, 7 cm across, and has no TABICATION with no pelvic ascites. I do not believe that this is representation of malignancy; nevertheless, I am going to run a CA-125 on her and I will proceed with a pelvic ultrasound as well as a GYN oncology referral for review.   3. I will send a notification to Dr. Leonard, the patient’s orthopedic surgeon, in regard to all her issues pertinent to the left hip. I think the patient is getting closer and closer to having a left hip replacement. 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 She recovered extremely well from her right hip surgery before.     The patient returned to the office on 05/24/2021. Since the previous visit the patient proceeded with a PET scan and I have reviewed this with her in the PAC system showing chest wall on the left side area of enlightening SUV activity that is almost 3 cm long x 7 mm wide. It is behind the bone. It is very close to the lower aspect of her heart. The reset  of the PET scan discloses no activity. This is also specific in regard to the ovaries and actually an ultrasound of her vagina looking into the ovaries documented an unilocular cyst on the left side with typical features of benignity and a  was completely normal 5.5.     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 The rest of the PET scan is very much negative normal. I would not be surprised if this is an area of the internal mammary node chain.     Obviously the ovarian cyst is benign only locular with a normal  and I find no reason to refer her to GYN oncology anymore.     Therefore my advice to her is as follows:  1. She will remain on her letrozole 2.5 mg a day.  2. She will initiate Kisqali at the usual dose 3 weeks on 1 week off making her aware of the side effects of the medicine including leukopenia, nausea, vomiting, rash, diarrhea, abnormalities in the liver function test and neutropenic fever. She will take the medicine at least for the next 6 months.  3. The patient will proceed with referral to radiation oncology to treat this area completely.  4. I am going to go ahead and make a referral to Cardiology in preparation for this site of radiation therapy and knowing that tangential fields will be difficult to produce, I think it will be important to have her to be seen by Cardiology in preparation of Kisqali use. Also I advised her that I would like for her to take a magnesium supplement and she needs to eat plenty of nuts to minimize hypomagnesemia that can help her to prolong QT interval that is the most important side effect of Kisqali to my eyes. I advised her to continue her blood pressure medication and I advised her also to have an EKG today and also have a repeated EKG upon return in 3 weeks.    5. The  patient will be having formal education and consent for Kisqali and she knows that this medicine will come to her from a speciality pharmacy.   6. The patient will require to have a repeat PET scan at least 6-8 weeks after completion of her radiation therapy to the chest wall.   7. I advised her and her brother present on the telephone of all of these events and she was ready to proceed.     The patient was further reviewed on 07/07/2021 after she has continued her Kisqali and completion of the 1st round of the medicine. Today her EKG disclosed a normal QT interval and this has been sent to Cardiology to be reported officially. 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 She has completed her radiation therapy to the epicardial right lymph node with no difficulties or side effects.     The thing that is bothering her the most is the pain in the left hip associated with advanced degenerative arthritis and she is limping substantially and having discomfort requiring to take pain medicine, Voltaren, on a regular schedule. She already has been scheduled to have her hip replaced in a few days. In preparation for this I have advised the patient the following.   1. She will discontinue her aspirin and her nonsteroidal anti-inflammatory medications a week in advance for her surgery. This will minimize the potential for bleeding.   2. The patient will hold off on the Kisqali until I review her back in the office in 6 weeks. She will require a blood count done 3 days before the surgery at the same time that she will require her official COVID test before the surgical intervention.   3. The patient will remain on Femara for the time being at 2.5 mg a day. She has no need to stop this medication anytime besides the day of the surgery. She will resume this after the surgery and as soon as she is able to receive oral route.   4. Eventually the patient will require radiological assessment upon review  in order to see what happened to the epicardial lymph node.           The patient was further reviewed on 09/30/2021. She has recovered further from the left hip surgery but she is not doing physical therapy anymore. She has a minimal limp and I advised her to go back on physical therapy on her own at home. She knows how to do the exercises and she has the afternoon off every single day.    The patient completed her Kisqali a week ago. Her white count is low today. The hemoglobin is stable. The platelet count is stable. She has had issues with the consecution of the Kisqali but the pharmacists have been working on this process with  her specialty pharmacy.     Today it caught my attention the significant bump in her liver function test, SGOT, SGPT. The patient is not drinking any alcohol. She is not taking any cholesterol medicine. She is not taking any anti-inflammatory medication and leads me to think that Kisqali is the culprit of this. Given these findings we advised the patient to put the Kisqali on hold until we recheck chemistry profile on weekly basis for the next 3 or 4 weeks. We need to settle these numbers down before she continues the Kisqali. She probably will require dose adjustment at some point. Again, her hepatitis A, B and C serologies are negative.     Instructed pharmacist to discuss these issues with her as well.     She will be called at home with the report of her CA 15-3.     I encouraged her to remain on her Femara though.     We provided her blood pressure medication. She will remain on this for the time being.     1. In regard to her history of breast cancer she was reviewed on 11/19/2021. Since the previous visit the patient has had tumor marker CA 15-3 that has dropped to 20. Radiological assessment of her chest and abdomen CT scan that documents resolution of the epicardial lymphadenopathy in the right hemithorax, no pulmonary nodules or metastasis, no pleural effusion and no liver  metastasis. No bone metastasis. Based on this information I do believe that the breast cancer is relatively quiet clinically, biochemically and radiologically and I advised her today to resume her Femara at the dose of 2.5 mg a day. The likelihood of Femara producing liver dysfunction is more than remote.   2. This patient has developed very significant abnormalities in her liver function test with persistent elevation of the SGOT, SGPT and alkaline phosphatase and normal bilirubin. 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 I have tested a multitude of liver issues including hepatitis A, B and C serologies that were negative, antitrypsin 1 that was normal, antimicrosomal antibodies that was normal, and AARON. Anti-celiac sprue panel also was done, ferritin level, copper also were done looking for Stephen's disease and hemochromatosis. All of these conditions are basically ruled out. I even went ahead and scheduled her to have a liver biopsy that documents according to the pathologist inflammatory process in the liver consistent with drug effect.    We have discontinued the Kisqali 2 months ago thinking that that was the culprit but the numbers have not improved, then we discontinued most of the other supplemental medicines that she was taking, this led the numbers to improve and the only thing that she is left taking is Voltaren. She takes the Voltaren for her arthritis. I advised her on 11/19/2021 that she must discontinue the Voltaren and hopefully this will be making her numbers in regard to liver dysfunction to improve.     We are sure that this patient is not drinking alcohol at all.    We reviewed her medication list today and the only thing that she is taking is metoprolol and vitamin D. We asked her to remain on these medicines.     I even went ahead a couple of weeks ago to put her on a low dose prednisone to see if this had any benefit in her liver function test and it has not. I asked her today  to discontinue this medicine as well.     I insisted today that the most likely reason why her liver function test is abnormal after all of the reviewing that we have done and also reviewing an alpha fetoprotein that was negative normal is drug effect. The only medicine that is leftover is antiinflammatory medication. Metoprolol doing this is kind of unexpected and she does not take Tylenol in enough amount to trigger this abnormality neither. She raised the question if she could take a Tylenol here and there and I think for now it will be okay but she will need to deal with her arthritic symptoms in a topical way or physical therapy way, acupuncture or some other methodology.     I would like for her to come to the office every couple of weeks to do a CBC and a CMP and nurse visit. I will review her back in 6 weeks with a CBC, CMP and CA 15-3. I expect that if the Voltaren is the culprit her liver function test should be normal in 6 weeks.     I will communicate all of these issues to Dar Kirkpatrick MD, who has seen her before. I do not know if he will agree with my assessment but he is welcome to pitch in with any other objection or idea.      I discussed all of these facts with the patient and her brother in the room. I went piece by piece and item by item over all of these issues and significance of each one of them. They understood this clearly.     ·   · THIS IS AN ILLNESS THAT POSES A THREAT TO LIFE AND BODY FUNCTION,,  AND REQUIRES INTENSIVE MONITORING OF A LAB TEST, A PHYSIOLOGIC TEST OR IMAGING FOR DIAGNOSIS, THERAPY AND TOXICITY.

## 2021-11-19 NOTE — TELEPHONE ENCOUNTER
----- Message from Elie Dixon MD sent at 11/19/2021 12:46 PM EST -----  Keep a pp with cardiologist, stay on asa  ----- Message -----  From: Caitlyn Edouard MA  Sent: 11/19/2021  10:35 AM EST  To: Elie Dixon MD    Pt forgot to ask if she is to remain off the Aspirin and if she needs the cardio appt please advise ?///

## 2021-11-22 ENCOUNTER — TELEPHONE (OUTPATIENT)
Dept: ONCOLOGY | Facility: CLINIC | Age: 61
End: 2021-11-22

## 2021-11-22 NOTE — TELEPHONE ENCOUNTER
----- Message from Elie Dixon MD sent at 11/19/2021 10:39 AM EST -----  CALL HER LIVER ENZYMES REMAIN THE SAME, STOP PREDNISONE NOW, AGAIN NO MORE VOLTAREN

## 2021-11-22 NOTE — TELEPHONE ENCOUNTER
Called pt to discuss Dr. Dixon' message re: her liver enzymes. Instructed her she is to stop her prednisone and continue to remain off of the voltaren. She v/u

## 2021-11-23 ENCOUNTER — DOCUMENTATION (OUTPATIENT)
Dept: PHARMACY | Facility: HOSPITAL | Age: 61
End: 2021-11-23

## 2021-11-23 NOTE — PROGRESS NOTES
Staff message rec from Dr Dixon-Esmer Olivares for pt right now-she is back on [facility].    Elie Dixon MD sent to [facility] FOR NOW, BACK ON [facility]  Specialty Pharmacy Technician

## 2021-12-03 ENCOUNTER — CLINICAL SUPPORT (OUTPATIENT)
Dept: ONCOLOGY | Facility: HOSPITAL | Age: 61
End: 2021-12-03

## 2021-12-03 ENCOUNTER — LAB (OUTPATIENT)
Dept: LAB | Facility: HOSPITAL | Age: 61
End: 2021-12-03

## 2021-12-03 DIAGNOSIS — Z17.0 MALIGNANT NEOPLASM OF OVERLAPPING SITES OF BOTH BREASTS IN FEMALE, ESTROGEN RECEPTOR POSITIVE (HCC): ICD-10-CM

## 2021-12-03 DIAGNOSIS — K71.6 DRUG-INDUCED HEPATITIS: ICD-10-CM

## 2021-12-03 DIAGNOSIS — C50.811 MALIGNANT NEOPLASM OF OVERLAPPING SITES OF BOTH BREASTS IN FEMALE, ESTROGEN RECEPTOR POSITIVE (HCC): ICD-10-CM

## 2021-12-03 DIAGNOSIS — T50.905A DRUG-INDUCED HEPATITIS: ICD-10-CM

## 2021-12-03 DIAGNOSIS — C50.812 MALIGNANT NEOPLASM OF OVERLAPPING SITES OF LEFT BREAST IN FEMALE, ESTROGEN RECEPTOR POSITIVE (HCC): ICD-10-CM

## 2021-12-03 DIAGNOSIS — C50.812 MALIGNANT NEOPLASM OF OVERLAPPING SITES OF BOTH BREASTS IN FEMALE, ESTROGEN RECEPTOR POSITIVE (HCC): ICD-10-CM

## 2021-12-03 DIAGNOSIS — Z17.0 MALIGNANT NEOPLASM OF OVERLAPPING SITES OF LEFT BREAST IN FEMALE, ESTROGEN RECEPTOR POSITIVE (HCC): ICD-10-CM

## 2021-12-03 LAB
ALBUMIN SERPL-MCNC: 4.4 G/DL (ref 3.5–5.2)
ALBUMIN/GLOB SERPL: 1.9 G/DL (ref 1.1–2.4)
ALP SERPL-CCNC: 150 U/L (ref 38–116)
ALT SERPL W P-5'-P-CCNC: 403 U/L (ref 0–33)
ANION GAP SERPL CALCULATED.3IONS-SCNC: 8.8 MMOL/L (ref 5–15)
AST SERPL-CCNC: 208 U/L (ref 0–32)
BASOPHILS # BLD AUTO: 0.03 10*3/MM3 (ref 0–0.2)
BASOPHILS NFR BLD AUTO: 0.8 % (ref 0–1.5)
BILIRUB SERPL-MCNC: 0.3 MG/DL (ref 0.2–1.2)
BUN SERPL-MCNC: 14 MG/DL (ref 6–20)
BUN/CREAT SERPL: 15.9 (ref 7.3–30)
CALCIUM SPEC-SCNC: 9.6 MG/DL (ref 8.5–10.2)
CHLORIDE SERPL-SCNC: 105 MMOL/L (ref 98–107)
CO2 SERPL-SCNC: 26.2 MMOL/L (ref 22–29)
CREAT SERPL-MCNC: 0.88 MG/DL (ref 0.6–1.1)
DEPRECATED RDW RBC AUTO: 54.7 FL (ref 37–54)
EOSINOPHIL # BLD AUTO: 0.04 10*3/MM3 (ref 0–0.4)
EOSINOPHIL NFR BLD AUTO: 1 % (ref 0.3–6.2)
ERYTHROCYTE [DISTWIDTH] IN BLOOD BY AUTOMATED COUNT: 15.4 % (ref 12.3–15.4)
GFR SERPL CREATININE-BSD FRML MDRD: 65 ML/MIN/1.73
GLOBULIN UR ELPH-MCNC: 2.3 GM/DL (ref 1.8–3.5)
GLUCOSE SERPL-MCNC: 103 MG/DL (ref 74–124)
HCT VFR BLD AUTO: 40.4 % (ref 34–46.6)
HGB BLD-MCNC: 12.5 G/DL (ref 12–15.9)
IMM GRANULOCYTES # BLD AUTO: 0.01 10*3/MM3 (ref 0–0.05)
IMM GRANULOCYTES NFR BLD AUTO: 0.3 % (ref 0–0.5)
LYMPHOCYTES # BLD AUTO: 0.69 10*3/MM3 (ref 0.7–3.1)
LYMPHOCYTES NFR BLD AUTO: 17.9 % (ref 19.6–45.3)
MCH RBC QN AUTO: 29.8 PG (ref 26.6–33)
MCHC RBC AUTO-ENTMCNC: 30.9 G/DL (ref 31.5–35.7)
MCV RBC AUTO: 96.2 FL (ref 79–97)
MONOCYTES # BLD AUTO: 0.26 10*3/MM3 (ref 0.1–0.9)
MONOCYTES NFR BLD AUTO: 6.8 % (ref 5–12)
NEUTROPHILS NFR BLD AUTO: 2.82 10*3/MM3 (ref 1.7–7)
NEUTROPHILS NFR BLD AUTO: 73.2 % (ref 42.7–76)
NRBC BLD AUTO-RTO: 0 /100 WBC (ref 0–0.2)
PLATELET # BLD AUTO: 180 10*3/MM3 (ref 140–450)
PMV BLD AUTO: 8.5 FL (ref 6–12)
POTASSIUM SERPL-SCNC: 4.4 MMOL/L (ref 3.5–4.7)
PROT SERPL-MCNC: 6.7 G/DL (ref 6.3–8)
RBC # BLD AUTO: 4.2 10*6/MM3 (ref 3.77–5.28)
SODIUM SERPL-SCNC: 140 MMOL/L (ref 134–145)
WBC NRBC COR # BLD: 3.85 10*3/MM3 (ref 3.4–10.8)

## 2021-12-03 PROCEDURE — 36415 COLL VENOUS BLD VENIPUNCTURE: CPT

## 2021-12-03 PROCEDURE — 85025 COMPLETE CBC W/AUTO DIFF WBC: CPT

## 2021-12-03 PROCEDURE — 80053 COMPREHEN METABOLIC PANEL: CPT

## 2021-12-03 NOTE — NURSING NOTE
Pt here for labs and RN review. CBC reviewed with pt. Counts are stable for this pt at this time. Pt has no c/o. Pt confirmed taking femara with no issues. Copy of labs given to pt. Pt stated she will watch mychart for CMP results for, for liver enzymes. Told pt I would call if there was anything abnormal. Pt v/u. Pt v/u to call with any new concerns.       Lab Results   Component Value Date    WBC 3.85 12/03/2021    HGB 12.5 12/03/2021    HCT 40.4 12/03/2021    MCV 96.2 12/03/2021     12/03/2021     Reviewed CMP with Dr. Dixon, liver enzymes remain elevated. No changes at this time.

## 2021-12-06 ENCOUNTER — TELEPHONE (OUTPATIENT)
Dept: ONCOLOGY | Facility: CLINIC | Age: 61
End: 2021-12-06

## 2021-12-06 ENCOUNTER — SPECIALTY PHARMACY (OUTPATIENT)
Dept: PHARMACY | Facility: HOSPITAL | Age: 61
End: 2021-12-06

## 2021-12-06 NOTE — TELEPHONE ENCOUNTER
----- Message from Elie Dixon MD sent at 12/3/2021  7:23 PM EST -----  Call her liver enzymes some better continue the same

## 2021-12-10 ENCOUNTER — OFFICE VISIT (OUTPATIENT)
Dept: CARDIOLOGY | Facility: CLINIC | Age: 61
End: 2021-12-10

## 2021-12-10 VITALS
HEART RATE: 62 BPM | HEIGHT: 66 IN | BODY MASS INDEX: 24.59 KG/M2 | DIASTOLIC BLOOD PRESSURE: 74 MMHG | SYSTOLIC BLOOD PRESSURE: 110 MMHG | WEIGHT: 153 LBS

## 2021-12-10 DIAGNOSIS — I51.89 CARDIAC MASS: Primary | ICD-10-CM

## 2021-12-10 DIAGNOSIS — I10 PRIMARY HYPERTENSION: ICD-10-CM

## 2021-12-10 DIAGNOSIS — C50.812 MALIGNANT NEOPLASM OF OVERLAPPING SITES OF LEFT BREAST IN FEMALE, ESTROGEN RECEPTOR POSITIVE (HCC): ICD-10-CM

## 2021-12-10 DIAGNOSIS — Z17.0 MALIGNANT NEOPLASM OF OVERLAPPING SITES OF LEFT BREAST IN FEMALE, ESTROGEN RECEPTOR POSITIVE (HCC): ICD-10-CM

## 2021-12-10 PROCEDURE — 99213 OFFICE O/P EST LOW 20 MIN: CPT | Performed by: INTERNAL MEDICINE

## 2021-12-10 PROCEDURE — 93000 ELECTROCARDIOGRAM COMPLETE: CPT | Performed by: INTERNAL MEDICINE

## 2021-12-10 NOTE — PROGRESS NOTES
Date of Office Visit: 12/10/2021  Encounter Provider: Danni Odell MD  Place of Service: [facility] CARDIOLOGY  Patient Name: Marylu Georges  :1960    Chief complaint  Cardio-Oncology follow-up.    History of Present Illness  Patient is a 61-year-old female with history of hypertension, prior stroke, left-sided breast cancer.  She had a large mass that was neglected for a period of time and became quite gigantic and ulcerated.  By 2019 she was diagnosed with breast cancer.  She had significant improvement with AC therapy in 2019 (total dose 243 mg/m² of Adriamycin) and this was followed by Taxol therapy which is completed on 2019. She subsequently underwent bilateral mastectomy 2019 with axillary node dissection.  She also underwent radiation therapy completed on 2020 and has been on adjuvant Femara since 2019.  She was able to achieve remission with this.  She then presented on 2021 showing some activity on a PET scan in the left side chest wall.  It was appearing to abut the lower aspect of her heart.  Kisquali was initiated.  She was started on magnesium with concerns of QTC prolongation that can be associated with this agent.  On 2021 tumor marker CA 15-3 had dropped resolution of the epicardial lymphadenopathy was noted by CT imaging.  Liver function tests were elevated and Kisquali  and then Femara were discontinued being seen by GI.    In 2019 she had an echocardiogram in the setting of syncope echocardiogram that showed an ejection fraction of 60% with moderately dilated left atrium and negative saline injection.  I do not see any strain measurements.  Stress perfusion study was negative for ischemia.  Monitor showed few episodes of atrial tachycardia that were quite brief.  There is no residual malignancy,  She then received radiation therapy to bilateral chest 10/19-.  With findings of mass near the pericardium follow-up echocardiogram was  performed on 6/2021 that showed an ejection fraction 57% with GLS -19.9% with normal wall thickness trivial valve regurgitation no pericardial mass was appreciated.      Since last visit she is active horse riding denies any chest pain palpitations syncope near syncope.  12/3/2020 includes normal CMP except for liver function tests remain elevated with ALT 43 , CBC unremarkable.  CT scan of the chest 10/2021 no pleural or other lymphadenopathy noted.  Epicardial fat pad noted.    Past Medical History:   Diagnosis Date   • Anemia in neoplastic disease    • Arthritis    • Breast cancer (HCC)     Left   • CVA (cerebral vascular accident) (HCC)    • Hip pain     RIGHT HIP... CYST   • History of fracture of leg 1987   • History of radiation therapy     LAST TREATMENT     • Hypertension    • Limited joint range of motion (ROM)     RIGHT HIP   • Skin sore     OPEN SORE LEFT BREAST   • Syncope    • Vertigo      Past Surgical History:   Procedure Laterality Date   • AXILLARY LYMPH NODE BIOPSY/EXCISION Right     LYMPH NODE UNDER RIGHT ARM-MALIGNANT (DOUBLE MASTECTOMY)   • BREAST BIOPSY Left     MALIGNANT   • FRACTURE SURGERY  1987    Leg   • HARDWARE REMOVAL     • MASTECTOMY W/ SENTINEL NODE BIOPSY Bilateral 9/16/2019    Procedure: BILATERLA MODIFIED RADICAL MASTECTOMY WITH BILATERAL SENTINEL LYMPH NODE BIOPSY;  Surgeon: Joby Barron Jr., MD;  Location: [facility];  Service: General   • TOTAL HIP ARTHROPLASTY Right 3/2/2020    Procedure: RIGHT TOTAL HIP ARTHROPLASTY NATALY NAVIGATION;  Surgeon: Luis M Leonard MD;  Location: [facility] OR;  Service: Orthopedics;  Laterality: Right;   • TOTAL HIP ARTHROPLASTY Left 7/20/2021    Procedure: Posterior LEFT TOTAL HIP ARTHROPLASTY NATALY NAVIGATION;  Surgeon: Luis M Leonard MD;  Location: [facility] OR;  Service: Orthopedics;  Laterality: Left;   • VENOUS ACCESS DEVICE (PORT) INSERTION Right 2/1/2019    Procedure: INSERTION VENOUS ACCESS DEVICE;  Surgeon:  "Joby Barron Jr., MD;  Location: [facility] OR [facility];  Service: General   • VENOUS ACCESS DEVICE (PORT) REMOVAL N/A 10/30/2019    Procedure: Mediport Removal;  Surgeon: Joby Barron Jr., MD;  Location: [facility] OR;  Service: General     Outpatient Medications Prior to Visit   Medication Sig Dispense Refill   • acetaminophen (TYLENOL) 650 MG 8 hr tablet Take 1,950 mg by mouth Every 8 (Eight) Hours As Needed.     • Cholecalciferol 250 MCG (64680 UT) tablet Take 1 tablet by mouth. Patient stated dose as \"1500 mg\"     • letrozole (FEMARA) 2.5 MG tablet Take 1 tablet by mouth Daily. 90 tablet 2   • magnesium oxide (MAG-OX) 400 MG tablet Take 400 mg by mouth 2 (two) times a day. Patient stated dose as \"1500mg\"     • meclizine (ANTIVERT) 25 MG tablet Take 1 tablet by mouth 3 (Three) Times a Day As Needed for Dizziness. 90 tablet 0   • metoprolol succinate XL (TOPROL-XL) 25 MG 24 hr tablet Take 1 tablet by mouth Daily. 30 tablet 2   • predniSONE (DELTASONE) 10 MG tablet Take 1 tablet by mouth Daily. 30 tablet 0     Facility-Administered Medications Prior to Visit   Medication Dose Route Frequency Provider Last Rate Last Admin   • Chlorhexidine Gluconate Cloth 2 % pads 1 each  1 each Apply externally Take As Directed Luis M Leonard MD           Allergies as of 12/10/2021 - Reviewed 12/10/2021   Allergen Reaction Noted   • Benadryl [diphenhydramine] Itching 02/18/2020   • Erythromycin GI Intolerance 01/17/2019   • Levaquin [levofloxacin] GI Intolerance 03/07/2019   • Penicillins GI Intolerance 01/17/2019     Social History     Socioeconomic History   • Marital status:      Spouse name: Cruz   • Number of children: 0   Tobacco Use   • Smoking status: Never Smoker   • Smokeless tobacco: Never Used   Vaping Use   • Vaping Use: Never used   Substance and Sexual Activity   • Alcohol use: Not Currently     Alcohol/week: 0.0 standard drinks     Comment: 2 CUPS DAILY   • Drug use: No   • Sexual activity: Not Currently " "    Partners: Male     Birth control/protection: Abstinence     Family History   Problem Relation Age of Onset   • Lung cancer Father    • Irritable bowel syndrome Father    • Cancer Mother    • Breast cancer Mother    • Liver disease Mother    • Breast cancer Sister 48   • Breast cancer Maternal Grandmother    • Breast cancer Paternal Grandmother    • Breast cancer Maternal Uncle    • Malig Hyperthermia Neg Hx      Review of Systems   Constitutional: Negative for chills, fever, weight gain and weight loss.   Cardiovascular: Negative for leg swelling.   Respiratory: Negative for cough, snoring and wheezing.    Hematologic/Lymphatic: Negative for bleeding problem. Does not bruise/bleed easily.   Skin: Negative for color change.   Musculoskeletal: Positive for joint pain. Negative for falls and myalgias.   Gastrointestinal: Negative for melena.   Genitourinary: Negative for hematuria.   Neurological: Negative for excessive daytime sleepiness.   Psychiatric/Behavioral: Negative for depression. The patient is not nervous/anxious.         Objective:     Vitals:    12/10/21 1137   BP: 110/74   Pulse: 62   Weight: 69.4 kg (153 lb)   Height: 167.6 cm (66\")     Body mass index is 24.69 kg/m².    Vitals reviewed.   Constitutional:       Appearance: Well-developed.   Eyes:      General: No scleral icterus.        Right eye: No discharge.      Conjunctiva/sclera: Conjunctivae normal.      Pupils: Pupils are equal, round, and reactive to light.   HENT:      Head: Normocephalic.      Nose: Nose normal.   Neck:      Thyroid: No thyromegaly.      Vascular: No JVD.   Pulmonary:      Effort: Pulmonary effort is normal. No respiratory distress.      Breath sounds: Normal breath sounds. No wheezing. No rales.   Cardiovascular:      Normal rate. Regular rhythm. Normal S1. Normal S2.      Murmurs: There is no murmur.      No gallop.   Pulses:     Intact distal pulses.   Edema:     Peripheral edema absent.   Abdominal:      General: Bowel " sounds are normal. There is no distension.      Palpations: Abdomen is soft.      Tenderness: There is no abdominal tenderness. There is no rebound.   Musculoskeletal: Normal range of motion.         General: No tenderness.      Cervical back: Normal range of motion and neck supple. Skin:     General: Skin is warm and dry.      Findings: No erythema or rash.   Neurological:      Mental Status: Alert and oriented to person, place, and time.   Psychiatric:         Behavior: Behavior normal.         Thought Content: Thought content normal.         Judgment: Judgment normal.       Lab Review:     ECG 12 Lead    Date/Time: 12/10/2021 12:09 PM  Performed by: Danni Odell MD  Authorized by: Danni Odell MD   Comparison: compared with previous ECG   Similar to previous ECG  Rhythm: sinus rhythm  Conduction: non-specific intraventricular conduction delay  Other findings: non-specific ST-T wave changes          Assessment:       Diagnosis Plan   1. Cardiac mass  ECG 12 Lead   2. Primary hypertension     3. Malignant neoplasm of overlapping sites of left breast in female, estrogen receptor positive (HCC)       Plan:       1.  Metastatic breast cancer.  Felt to be stable.  Followed now off chemotherapy by Dr. Dixon.  On Femara at this time.  2.  Abnormal EKG. ischemia on prior stress test ejection fraction is normal..  No anginal symptoms and looking quite well.  3.  Pericardial mass.  Resolved on chemotherapy.  And being followed by serial imaging by Dr. Dixon.  4.  Hypertension well controlled  5.  Elevated liver function tests.  She had a biopsy and is being followed by Dr. Kirkpatrick.  Placed on prednisone with some improvement in numbers.  Not on any hepatotoxic medications other than as needed use of Tylenol which he uses quite sparingly.  6.  Arthritis.      Time Spent: I spent 25 minutes caring for Marylu on this date of service. This time includes time spent by me in the following activities: preparing for the visit,  "reviewing tests, obtaining and/or reviewing a separately obtained history, performing a medically appropriate examination and/or evaluation, counseling and educating the patient/family/caregiver, documenting information in the medical record and independently interpreting results and communicating that information with the patient/family/caregiver.   I spent 1 minutes on the separately reported service of ECG. This time is not included in the time used to support the E/M service also reported today.        Your medication list          Accurate as of December 10, 2021 11:59 PM. If you have any questions, ask your nurse or doctor.            CONTINUE taking these medications      Instructions Last Dose Given Next Dose Due   acetaminophen 650 MG 8 hr tablet  Commonly known as: TYLENOL      Take 1,950 mg by mouth Every 8 (Eight) Hours As Needed.       Cholecalciferol 250 MCG (16527 UT) tablet      Take 1 tablet by mouth. Patient stated dose as \"1500 mg\"       letrozole 2.5 MG tablet  Commonly known as: FEMARA      Take 1 tablet by mouth Daily.       magnesium oxide 400 MG tablet  Commonly known as: MAG-OX      Take 400 mg by mouth 2 (two) times a day. Patient stated dose as \"1500mg\"       meclizine 25 MG tablet  Commonly known as: ANTIVERT      Take 1 tablet by mouth 3 (Three) Times a Day As Needed for Dizziness.       metoprolol succinate XL 25 MG 24 hr tablet  Commonly known as: TOPROL-XL      Take 1 tablet by mouth Daily.          STOP taking these medications    predniSONE 10 MG tablet  Commonly known as: DELTASONE  Stopped by: Danni Odell MD               Patient is no longer taking prednisone.  I corrected the med list to reflect this.  I did not stop these medications.      Dictated utilizing Dragon dictation  "

## 2021-12-17 ENCOUNTER — LAB (OUTPATIENT)
Dept: LAB | Facility: HOSPITAL | Age: 61
End: 2021-12-17

## 2021-12-17 ENCOUNTER — CLINICAL SUPPORT (OUTPATIENT)
Dept: ONCOLOGY | Facility: HOSPITAL | Age: 61
End: 2021-12-17

## 2021-12-17 DIAGNOSIS — Z17.0 MALIGNANT NEOPLASM OF OVERLAPPING SITES OF BOTH BREASTS IN FEMALE, ESTROGEN RECEPTOR POSITIVE (HCC): ICD-10-CM

## 2021-12-17 DIAGNOSIS — T50.905A DRUG-INDUCED HEPATITIS: ICD-10-CM

## 2021-12-17 DIAGNOSIS — C50.812 MALIGNANT NEOPLASM OF OVERLAPPING SITES OF LEFT BREAST IN FEMALE, ESTROGEN RECEPTOR POSITIVE (HCC): Primary | ICD-10-CM

## 2021-12-17 DIAGNOSIS — C50.812 MALIGNANT NEOPLASM OF OVERLAPPING SITES OF BOTH BREASTS IN FEMALE, ESTROGEN RECEPTOR POSITIVE (HCC): ICD-10-CM

## 2021-12-17 DIAGNOSIS — Z17.0 MALIGNANT NEOPLASM OF OVERLAPPING SITES OF LEFT BREAST IN FEMALE, ESTROGEN RECEPTOR POSITIVE (HCC): Primary | ICD-10-CM

## 2021-12-17 DIAGNOSIS — C50.811 MALIGNANT NEOPLASM OF OVERLAPPING SITES OF BOTH BREASTS IN FEMALE, ESTROGEN RECEPTOR POSITIVE (HCC): ICD-10-CM

## 2021-12-17 DIAGNOSIS — K71.6 DRUG-INDUCED HEPATITIS: ICD-10-CM

## 2021-12-17 LAB
ALBUMIN SERPL-MCNC: 4.7 G/DL (ref 3.5–5.2)
ALBUMIN/GLOB SERPL: 1.8 G/DL (ref 1.1–2.4)
ALP SERPL-CCNC: 140 U/L (ref 38–116)
ALT SERPL W P-5'-P-CCNC: 456 U/L (ref 0–33)
ANION GAP SERPL CALCULATED.3IONS-SCNC: 11.2 MMOL/L (ref 5–15)
AST SERPL-CCNC: 239 U/L (ref 0–32)
BASOPHILS # BLD AUTO: 0.04 10*3/MM3 (ref 0–0.2)
BASOPHILS NFR BLD AUTO: 0.9 % (ref 0–1.5)
BILIRUB SERPL-MCNC: 0.6 MG/DL (ref 0.2–1.2)
BUN SERPL-MCNC: 17 MG/DL (ref 6–20)
BUN/CREAT SERPL: 18.9 (ref 7.3–30)
CALCIUM SPEC-SCNC: 9.9 MG/DL (ref 8.5–10.2)
CANCER AG15-3 SERPL-ACNC: 19.9 U/ML
CHLORIDE SERPL-SCNC: 104 MMOL/L (ref 98–107)
CO2 SERPL-SCNC: 24.8 MMOL/L (ref 22–29)
CREAT SERPL-MCNC: 0.9 MG/DL (ref 0.6–1.1)
DEPRECATED RDW RBC AUTO: 51.4 FL (ref 37–54)
EOSINOPHIL # BLD AUTO: 0.04 10*3/MM3 (ref 0–0.4)
EOSINOPHIL NFR BLD AUTO: 0.9 % (ref 0.3–6.2)
ERYTHROCYTE [DISTWIDTH] IN BLOOD BY AUTOMATED COUNT: 14.7 % (ref 12.3–15.4)
GFR SERPL CREATININE-BSD FRML MDRD: 63 ML/MIN/1.73
GLOBULIN UR ELPH-MCNC: 2.6 GM/DL (ref 1.8–3.5)
GLUCOSE SERPL-MCNC: 89 MG/DL (ref 74–124)
HCT VFR BLD AUTO: 44 % (ref 34–46.6)
HGB BLD-MCNC: 13.6 G/DL (ref 12–15.9)
IMM GRANULOCYTES # BLD AUTO: 0.01 10*3/MM3 (ref 0–0.05)
IMM GRANULOCYTES NFR BLD AUTO: 0.2 % (ref 0–0.5)
LYMPHOCYTES # BLD AUTO: 0.81 10*3/MM3 (ref 0.7–3.1)
LYMPHOCYTES NFR BLD AUTO: 18.4 % (ref 19.6–45.3)
MCH RBC QN AUTO: 29.2 PG (ref 26.6–33)
MCHC RBC AUTO-ENTMCNC: 30.9 G/DL (ref 31.5–35.7)
MCV RBC AUTO: 94.6 FL (ref 79–97)
MONOCYTES # BLD AUTO: 0.3 10*3/MM3 (ref 0.1–0.9)
MONOCYTES NFR BLD AUTO: 6.8 % (ref 5–12)
NEUTROPHILS NFR BLD AUTO: 3.2 10*3/MM3 (ref 1.7–7)
NEUTROPHILS NFR BLD AUTO: 72.8 % (ref 42.7–76)
NRBC BLD AUTO-RTO: 0 /100 WBC (ref 0–0.2)
PLATELET # BLD AUTO: 183 10*3/MM3 (ref 140–450)
PMV BLD AUTO: 8.3 FL (ref 6–12)
POTASSIUM SERPL-SCNC: 4.2 MMOL/L (ref 3.5–4.7)
PROT SERPL-MCNC: 7.3 G/DL (ref 6.3–8)
RBC # BLD AUTO: 4.65 10*6/MM3 (ref 3.77–5.28)
SODIUM SERPL-SCNC: 140 MMOL/L (ref 134–145)
WBC NRBC COR # BLD: 4.4 10*3/MM3 (ref 3.4–10.8)

## 2021-12-17 PROCEDURE — 80053 COMPREHEN METABOLIC PANEL: CPT

## 2021-12-17 PROCEDURE — 85025 COMPLETE CBC W/AUTO DIFF WBC: CPT

## 2021-12-17 PROCEDURE — 36415 COLL VENOUS BLD VENIPUNCTURE: CPT

## 2021-12-17 PROCEDURE — 86300 IMMUNOASSAY TUMOR CA 15-3: CPT | Performed by: INTERNAL MEDICINE

## 2021-12-17 PROCEDURE — G0463 HOSPITAL OUTPT CLINIC VISIT: HCPCS

## 2021-12-17 NOTE — NURSING NOTE
Pt here for labs and RN review. CBC reviewed with pt. Counts are stable for this pt at this time. Pt has no c/o, states she is doing well. Pt stated she will look at my chart for CMP results. Told pt I would call after results are back. Pt v/u. Copy of labs given to pt and f/u appt reviewed. Pt v/u to call with any new concerns.         Lab Results   Component Value Date    WBC 4.40 12/17/2021    HGB 13.6 12/17/2021    HCT 44.0 12/17/2021    MCV 94.6 12/17/2021     12/17/2021     Reviewed CMP with Dr. Dixon.  and . Per Dr. Dixon have pt stop femara and recheck labs in 2 weeks. Pt already scheduled back in two weeks for labs and MD visit. Let pt know stop femara and will recheck labs in 2 weeks when she is back to see Dr. Dixon. Pt v/u.

## 2021-12-28 ENCOUNTER — TELEPHONE (OUTPATIENT)
Dept: ONCOLOGY | Facility: CLINIC | Age: 61
End: 2021-12-28

## 2021-12-29 ENCOUNTER — LAB (OUTPATIENT)
Dept: LAB | Facility: HOSPITAL | Age: 61
End: 2021-12-29

## 2021-12-29 ENCOUNTER — OFFICE VISIT (OUTPATIENT)
Dept: ONCOLOGY | Facility: CLINIC | Age: 61
End: 2021-12-29

## 2021-12-29 VITALS
WEIGHT: 156 LBS | OXYGEN SATURATION: 97 % | HEIGHT: 66 IN | DIASTOLIC BLOOD PRESSURE: 69 MMHG | BODY MASS INDEX: 25.07 KG/M2 | TEMPERATURE: 96.9 F | HEART RATE: 58 BPM | SYSTOLIC BLOOD PRESSURE: 100 MMHG | RESPIRATION RATE: 14 BRPM

## 2021-12-29 DIAGNOSIS — T50.905A DRUG-INDUCED HEPATITIS: ICD-10-CM

## 2021-12-29 DIAGNOSIS — C50.812 MALIGNANT NEOPLASM OF OVERLAPPING SITES OF LEFT BREAST IN FEMALE, ESTROGEN RECEPTOR POSITIVE: Primary | ICD-10-CM

## 2021-12-29 DIAGNOSIS — C50.811 MALIGNANT NEOPLASM OF OVERLAPPING SITES OF BOTH BREASTS IN FEMALE, ESTROGEN RECEPTOR POSITIVE (HCC): ICD-10-CM

## 2021-12-29 DIAGNOSIS — C50.812 MALIGNANT NEOPLASM OF OVERLAPPING SITES OF BOTH BREASTS IN FEMALE, ESTROGEN RECEPTOR POSITIVE (HCC): ICD-10-CM

## 2021-12-29 DIAGNOSIS — K71.6 DRUG-INDUCED HEPATITIS: ICD-10-CM

## 2021-12-29 DIAGNOSIS — Z17.0 MALIGNANT NEOPLASM OF OVERLAPPING SITES OF BOTH BREASTS IN FEMALE, ESTROGEN RECEPTOR POSITIVE: ICD-10-CM

## 2021-12-29 DIAGNOSIS — Z79.899 ENCOUNTER FOR LONG-TERM (CURRENT) USE OF OTHER MEDICATIONS: ICD-10-CM

## 2021-12-29 DIAGNOSIS — C50.812 MALIGNANT NEOPLASM OF OVERLAPPING SITES OF BOTH BREASTS IN FEMALE, ESTROGEN RECEPTOR POSITIVE: ICD-10-CM

## 2021-12-29 DIAGNOSIS — C50.811 MALIGNANT NEOPLASM OF OVERLAPPING SITES OF BOTH BREASTS IN FEMALE, ESTROGEN RECEPTOR POSITIVE: ICD-10-CM

## 2021-12-29 DIAGNOSIS — C50.812 MALIGNANT NEOPLASM OF OVERLAPPING SITES OF LEFT BREAST IN FEMALE, ESTROGEN RECEPTOR POSITIVE (HCC): Primary | ICD-10-CM

## 2021-12-29 DIAGNOSIS — Z17.0 MALIGNANT NEOPLASM OF OVERLAPPING SITES OF BOTH BREASTS IN FEMALE, ESTROGEN RECEPTOR POSITIVE (HCC): ICD-10-CM

## 2021-12-29 DIAGNOSIS — Z17.0 MALIGNANT NEOPLASM OF OVERLAPPING SITES OF LEFT BREAST IN FEMALE, ESTROGEN RECEPTOR POSITIVE: Primary | ICD-10-CM

## 2021-12-29 DIAGNOSIS — Z17.0 MALIGNANT NEOPLASM OF OVERLAPPING SITES OF LEFT BREAST IN FEMALE, ESTROGEN RECEPTOR POSITIVE (HCC): Primary | ICD-10-CM

## 2021-12-29 DIAGNOSIS — I10 PRIMARY HYPERTENSION: ICD-10-CM

## 2021-12-29 LAB
ALBUMIN SERPL-MCNC: 4.5 G/DL (ref 3.5–5.2)
ALBUMIN/GLOB SERPL: 1.8 G/DL (ref 1.1–2.4)
ALP SERPL-CCNC: 133 U/L (ref 38–116)
ALT SERPL W P-5'-P-CCNC: 326 U/L (ref 0–33)
ANION GAP SERPL CALCULATED.3IONS-SCNC: 9.9 MMOL/L (ref 5–15)
AST SERPL-CCNC: 167 U/L (ref 0–32)
BASOPHILS # BLD AUTO: 0.05 10*3/MM3 (ref 0–0.2)
BASOPHILS NFR BLD AUTO: 1.3 % (ref 0–1.5)
BILIRUB SERPL-MCNC: 0.6 MG/DL (ref 0.2–1.2)
BUN SERPL-MCNC: 17 MG/DL (ref 6–20)
BUN/CREAT SERPL: 18.3 (ref 7.3–30)
CALCIUM SPEC-SCNC: 9.9 MG/DL (ref 8.5–10.2)
CHLORIDE SERPL-SCNC: 103 MMOL/L (ref 98–107)
CO2 SERPL-SCNC: 28.1 MMOL/L (ref 22–29)
CREAT SERPL-MCNC: 0.93 MG/DL (ref 0.6–1.1)
DEPRECATED RDW RBC AUTO: 50.6 FL (ref 37–54)
EOSINOPHIL # BLD AUTO: 0.06 10*3/MM3 (ref 0–0.4)
EOSINOPHIL NFR BLD AUTO: 1.6 % (ref 0.3–6.2)
ERYTHROCYTE [DISTWIDTH] IN BLOOD BY AUTOMATED COUNT: 14.3 % (ref 12.3–15.4)
GFR SERPL CREATININE-BSD FRML MDRD: 61 ML/MIN/1.73
GLOBULIN UR ELPH-MCNC: 2.5 GM/DL (ref 1.8–3.5)
GLUCOSE SERPL-MCNC: 120 MG/DL (ref 74–124)
HCT VFR BLD AUTO: 42.2 % (ref 34–46.6)
HGB BLD-MCNC: 13.1 G/DL (ref 12–15.9)
IMM GRANULOCYTES # BLD AUTO: 0.01 10*3/MM3 (ref 0–0.05)
IMM GRANULOCYTES NFR BLD AUTO: 0.3 % (ref 0–0.5)
LYMPHOCYTES # BLD AUTO: 0.9 10*3/MM3 (ref 0.7–3.1)
LYMPHOCYTES NFR BLD AUTO: 23.5 % (ref 19.6–45.3)
MCH RBC QN AUTO: 29.7 PG (ref 26.6–33)
MCHC RBC AUTO-ENTMCNC: 31 G/DL (ref 31.5–35.7)
MCV RBC AUTO: 95.7 FL (ref 79–97)
MONOCYTES # BLD AUTO: 0.25 10*3/MM3 (ref 0.1–0.9)
MONOCYTES NFR BLD AUTO: 6.5 % (ref 5–12)
NEUTROPHILS NFR BLD AUTO: 2.56 10*3/MM3 (ref 1.7–7)
NEUTROPHILS NFR BLD AUTO: 66.8 % (ref 42.7–76)
NRBC BLD AUTO-RTO: 0 /100 WBC (ref 0–0.2)
PLATELET # BLD AUTO: 175 10*3/MM3 (ref 140–450)
PMV BLD AUTO: 8 FL (ref 6–12)
POTASSIUM SERPL-SCNC: 3.9 MMOL/L (ref 3.5–4.7)
PROT SERPL-MCNC: 7 G/DL (ref 6.3–8)
RBC # BLD AUTO: 4.41 10*6/MM3 (ref 3.77–5.28)
SODIUM SERPL-SCNC: 141 MMOL/L (ref 134–145)
WBC NRBC COR # BLD: 3.83 10*3/MM3 (ref 3.4–10.8)

## 2021-12-29 PROCEDURE — 85025 COMPLETE CBC W/AUTO DIFF WBC: CPT

## 2021-12-29 PROCEDURE — 99214 OFFICE O/P EST MOD 30 MIN: CPT | Performed by: INTERNAL MEDICINE

## 2021-12-29 PROCEDURE — 80053 COMPREHEN METABOLIC PANEL: CPT

## 2021-12-29 PROCEDURE — 36415 COLL VENOUS BLD VENIPUNCTURE: CPT

## 2021-12-29 NOTE — PROGRESS NOTES
Subjective     REASON FOR FOLLOW UP:  1. 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 R BREAST MASS WITH GIANT R AXILLARY ADENOPATHY.  SHE UNDERWENT DOSE DENSE AC, TAXOL, BILATERAL MASTECTOMIES, DRAMATIC NEAR COMPLETE CO, RADIATION THERAPY AND ADJUVANT FEMARA.    3. FAMILY HISTORY OF BREAST CANCER IN MOTHER AND SISTER, SISTER WAS TESTED FOR BRCA AND WAS NEGATIVE.    4. LEFT OVARIAN CYST , IT HAS BEEN PRESENT FOR LONG TIME BUT IS GETTING LARGER, ASYMPTOMATIC, NEED FOR , PELVIC US AND GYN ONC EVEAL                   5. LEFT HIP PAIN SEVERE ARTHRITIS, REAL NEED FOR LEFT HIP REPLACEMENT: PERFORMED 8/21    6. 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        DURING THE VISIT WITH THE PATIENT TODAY , PATIENT HAD FACE MASK, MY MEDICAL ASSISTANT AND I  HAD PROPPER PROTECTIVE EQUIPMENT, AND I DID HAND HYGIENE WITH SOAP AND WATER BEFORE AND AFTER THE VISIT.    .  This patient returns today to the office for her followup of her drug-induced hepatitis. Even though we have cleaned up most of her medicines we went between a rock and a hard place a few days ago when we continued seeing the increase in her alkaline phosphatase, SGOT and SGPT. We reviewed the information pertinent to the patient with all our partners in the office asking the question if they ever have seen Femara producing any significant hepatitis and the answer was no. We reviewed literature and is barely reported. In any event, I asked the patient to discontinue the Femara close to 10 days ago. The patient actually does not feel bad. She is fatigued and now she continues having her usual aches and pains associated with osteoarthritis in her hips and so forth but she is functioning relatively well. Her appetite is good. She has no nausea, vomiting, heartburn, indigestion, abdominal distention, jaundice or changes in bowel habits. Urination in color and volume is normal. No fevers, chills, rash or  joint pain besides the pain at the surgical site in the left hip. Minimal back pain. Her blood pressure is under good control. She has been seen by Cardiology.     She reassures me that she is not taking any other medicines over-the-counter and obviously multiple questions before, she is not drinking any alcohol.              Past Medical History:   Diagnosis Date   • Anemia in neoplastic disease    • Arthritis    • Breast cancer (HCC)     Left   • CVA (cerebral vascular accident) (HCC)    • Hip pain     RIGHT HIP... CYST   • History of fracture of leg 1987   • History of radiation therapy     LAST TREATMENT     • Hypertension    • Limited joint range of motion (ROM)     RIGHT HIP   • Skin sore     OPEN SORE LEFT BREAST   • Syncope    • Vertigo            Past Surgical History:   Procedure Laterality Date   • AXILLARY LYMPH NODE BIOPSY/EXCISION Right     LYMPH NODE UNDER RIGHT ARM-MALIGNANT (DOUBLE MASTECTOMY)   • BREAST BIOPSY Left     MALIGNANT   • FRACTURE SURGERY  1987    Leg   • HARDWARE REMOVAL     • MASTECTOMY W/ SENTINEL NODE BIOPSY Bilateral 9/16/2019    Procedure: BILATERLA MODIFIED RADICAL MASTECTOMY WITH BILATERAL SENTINEL LYMPH NODE BIOPSY;  Surgeon: Joby Barron Jr., MD;  Location: [facility];  Service: General   • TOTAL HIP ARTHROPLASTY Right 3/2/2020    Procedure: RIGHT TOTAL HIP ARTHROPLASTY NATALY NAVIGATION;  Surgeon: Luis M Leonard MD;  Location: [facility] OR;  Service: Orthopedics;  Laterality: Right;   • TOTAL HIP ARTHROPLASTY Left 7/20/2021    Procedure: Posterior LEFT TOTAL HIP ARTHROPLASTY NATALY NAVIGATION;  Surgeon: Luis M Leonard MD;  Location: [facility] OR;  Service: Orthopedics;  Laterality: Left;   • VENOUS ACCESS DEVICE (PORT) INSERTION Right 2/1/2019    Procedure: INSERTION VENOUS ACCESS DEVICE;  Surgeon: Joby Barron Jr., MD;  Location: [facility];  Service: General   • VENOUS ACCESS DEVICE (PORT) REMOVAL N/A 10/30/2019    Procedure: Mediport Removal;   "Surgeon: Joby Barron Jr., MD;  Location: [facility] OR;  Service: General        Current Outpatient Medications on File Prior to Visit   Medication Sig Dispense Refill   • acetaminophen (TYLENOL) 650 MG 8 hr tablet Take 1,950 mg by mouth Every 8 (Eight) Hours As Needed.     • Cholecalciferol 250 MCG (21081 UT) tablet Take 1 tablet by mouth. Patient stated dose as \"1500 mg\"     • meclizine (ANTIVERT) 25 MG tablet Take 1 tablet by mouth 3 (Three) Times a Day As Needed for Dizziness. 90 tablet 0   • metoprolol succinate XL (TOPROL-XL) 25 MG 24 hr tablet Take 1 tablet by mouth Daily. 30 tablet 2   • letrozole (FEMARA) 2.5 MG tablet Take 1 tablet by mouth Daily. 90 tablet 2   • magnesium oxide (MAG-OX) 400 MG tablet Take 400 mg by mouth 2 (two) times a day. Patient stated dose as \"1500mg\"       Current Facility-Administered Medications on File Prior to Visit   Medication Dose Route Frequency Provider Last Rate Last Admin   • Chlorhexidine Gluconate Cloth 2 % pads 1 each  1 each Apply externally Take As Directed Luis M Leonard MD            ALLERGIES:    Allergies   Allergen Reactions   • Benadryl [Diphenhydramine] Itching     INCREASES BLOOD PRESSURE   • Erythromycin GI Intolerance   • Levaquin [Levofloxacin] GI Intolerance   • Penicillins GI Intolerance        Social History     Socioeconomic History   • Marital status:      Spouse name: Cruz   • Number of children: 0   Tobacco Use   • Smoking status: Never Smoker   • Smokeless tobacco: Never Used   Vaping Use   • Vaping Use: Never used   Substance and Sexual Activity   • Alcohol use: Not Currently     Alcohol/week: 0.0 standard drinks     Comment: 2 CUPS DAILY   • Drug use: No   • Sexual activity: Not Currently     Partners: Male     Birth control/protection: Abstinence        Family History   Problem Relation Age of Onset   • Lung cancer Father    • Irritable bowel syndrome Father    • Cancer Mother    • Breast cancer Mother    • Liver disease Mother  " "  • Breast cancer Sister 48   • Breast cancer Maternal Grandmother    • Breast cancer Paternal Grandmother    • Breast cancer Maternal Uncle    • Malig Hyperthermia Neg Hx             Objective     Vitals:    12/29/21 1151   BP: 100/69   Pulse: 58   Resp: 14   Temp: 96.9 °F (36.1 °C)   TempSrc: Temporal   SpO2: 97%   Weight: 70.8 kg (156 lb)   Height: 167 cm (65.75\")   PainSc: 0-No pain     Current Status 12/29/2021   ECOG score 0     Exam:     I HAVE PERSONALLY REVIEWED THE HISTORY OF THE PRESENT ILLNESS, PAST MEDICAL HISTORY, FAMILY HISTORY, SOCIAL HISTORY, ALLERGIES, MEDICATIONS STATED ABOVE IN THE  NOTE FROM TODAY.        GENERAL:  Well-developed, well-nourished  Patient  in no acute distress.   SKIN:  Warm, dry ,NO rashes,NO purpura ,NO petechiae.  HEENT:  Pupils were equal and reactive to light and accomodation, conjunctivae noninjected, no pterygium, normal extraocular movements, normal visual acuity.   NECK:  Supple with good range of motion; no thyromegaly , no other masses, no JVD or bruits, no cervical adenopathies.No carotid artery pain, no carotid abnormal pulsation , NO arterial dance.  LYMPHATICS:  No cervical, NO supraclavicular, NO axillary,NO epitrochlear , NO inguinal adenopathy.  CARDIAC   normal rate and regular rhythm, without murmur,NO rubs NO S3 NO S4 right or left .  LUNGS: normal breath sounds bilateral, no wheezing, rhonchi, crackles or rubs.Chest wall bilateral mastectomy with no masses, induration, tenderness or lesions suggestive of any cancer recurrence.      VASCULAR VENOUS: no cyanosis, collateral circulation, varicosities, edema, palpable cords, pain, erythema.  ABDOMEN:  Soft, nontender with no hepatomegaly, no splenomegaly,no masses, no ascites, no collateral circulation,no distention,no Eldridge sign.  EXTREMITIES  AND SPINE:  No clubbing, cyanosis or edema, no deformities , no pain .No kyphosis, scoliosis, no other deformities, no pain in spine, no pain in ribs , no pain inpelvic " bone.  NEUROLOGICAL:  Patient was awake, alert, oriented to time, person and place.    .            RECENT LABS:  Hematology WBC   Date Value Ref Range Status   12/29/2021 3.83 3.40 - 10.80 10*3/mm3 Final     RBC   Date Value Ref Range Status   12/29/2021 4.41 3.77 - 5.28 10*6/mm3 Final     Hemoglobin   Date Value Ref Range Status   12/29/2021 13.1 12.0 - 15.9 g/dL Final     Hematocrit   Date Value Ref Range Status   12/29/2021 42.2 34.0 - 46.6 % Final     Platelets   Date Value Ref Range Status   12/29/2021 175 140 - 450 10*3/mm3 Final                                                              Comprehensive Metabolic Panel  Order: [id number]   Status: Final result     Visible to patient: No (scheduled for 12/30/2021  2:20 AM)     Next appt: 01/25/2022 at 01:50 PM in Lab (LAB CHAIR 2 [facility])     Dx: Malignant neoplasm of overlapping sit...    Specimen Information: Blood         0 Result Notes    Component   Ref Range & Units 11:14   (12/29/21) 12 d ago   (12/17/21) 3 wk ago   (12/3/21) 1 mo ago   (11/19/21) 1 mo ago   (11/10/21) 2 mo ago   (10/27/21) 2 mo ago   (10/20/21)   Glucose   74 - 124 mg/dL 120  89  103  131 High   78  121  126 High     BUN   6 - 20 mg/dL 17  17  14  22 High   23 High   19  24 High     Creatinine   0.60 - 1.10 mg/dL 0.93  0.90  0.88  0.85  0.90  0.87  0.88    Sodium   134 - 145 mmol/L 141  140  140  139  139  140  138    Potassium   3.5 - 4.7 mmol/L 3.9  4.2  4.4  4.3  3.8  3.9  3.9    Chloride   98 - 107 mmol/L 103  104  105  102  105  104  105    CO2   22.0 - 29.0 mmol/L 28.1  24.8  26.2  26.1  24.4  25.7  21.9 Low     Calcium   8.5 - 10.2 mg/dL 9.9  9.9  9.6  9.9  9.4  9.7  9.5    Total Protein   6.3 - 8.0 g/dL 7.0  7.3  6.7  7.3  6.5  7.1  6.7    Albumin   3.50 - 5.20 g/dL 4.50  4.70  4.40  4.60  4.30  4.70  4.50    ALT (SGPT)   0 - 33 U/L 326 High Critical   456 High Critical   403 High Critical   442 High Critical   464 High Critical   475 High Critical   421 High Critical     AST  (SGOT)   0 - 32 U/L 167 High Critical   239 High Critical   208 High Critical   186 High Critical   183 High Critical   228 High Critical   201 High Critical     Alkaline Phosphatase   38 - 116 U/L 133 High   140 High   150 High   157 High   158 High   123 High   115    Total Bilirubin   0.2 - 1.2 mg/dL 0.6  0.6  0.3  0.5  0.3  0.3  0.3    eGFR Non African Amer   >60 mL/min/1.73 61  63  65  68  63  66  65    Globulin   1.8 - 3.5 gm/dL 2.5  2.6  2.3  2.7  2.2  2.4  2.2    A/G Ratio   1.1 - 2.4 g/dL 1.8  1.8  1.9  1.7  2.0  2.0  2.0    BUN/Creatinine Ratio   7.3 - 30.0 18.3  18.9  15.9  25.9  25.6  21.8  27.3    Anion Gap   5.0 - 15.0 mmol/L 9.9  11.2  8.8  10.9  9.6  10.3  11.1    Resulting Agency  CBC LAB  CBC LAB  CBC LAB              Assessment/Plan    1.  History of least for the last 4 years, she has had an in crescendo mass in the left breast that has produced a gigantic tumor that WAS close to 25 cm in size and is replacing most of the anatomy of the left breast. There are areas of ulceration necrosis and bleeding and the mass is fixed to the chest wall. She has abundant amount of collateral circulation in the left anterior chest wall and it caught my attention the lack of any left axillary adenopathy. She has no lymphedema in the left upper extremity. In the right breast while in sitting position, no palpable abnormalities are documented. The right breast skin and nipple are normal. When the patient lies flat, there is a palpable mass at 9 o'clock from the nipple that measures probably 4 cm in size, very indistinct in regard to edges and margins. There was no mobility and no areas of tenderness. She has a giant adenopathy in the right axilla that measures close to 9 cm in size, uniform, no fluctuation, nontender, completely fixed to the axillary wall. There is no compromise of the skin.     After completion of 4 cycles of AC patient has had very dramatic improvement in all sites of disease including  right axilla and left breast.    · Completed 4 cycles Adriamycin Cytoxan on 4/12/2019.    · Patient started weekly Taxol treatments on 5/3/2019.  · Completed 12 weekly Taxol treatments on 7/19/2019.  · 9/16/2019 bilateral mastectomy by Dr. Joby Barron with axillary lymph node dissection.  No residual malignancy.  · 10/30/2019 patient's Mediport was removed in anticipation of radiation.  · Radiation therapy under the care of Dr. Marmolejo 10/31/2019-1/13/2020 to the right chest wall.  · Started on adjuvant Femara 2.5 mg daily 8/20/2019.  · 9/21/2020 continues on adjuvant Femara, tolerating it quite well.  Some mild hot flashes and mild arthralgias, all which are tolerable.  I advised the patient that at this point her breast cancer remains in remission. She is 100% compliant of her medication letrozole and her clinical examination remains negative normal for breast cancer recurrence.   The patient was further reviewed on 04/16/2021. Her clinical examination is completely negative normal and her questioning is negative in regard to symptoms that would suggest breast cancer recurrence. She is 100% compliant with her Femara. Her white count, hemoglobin and platelets remain normal. Her tumor marker during the previous visit was normal and we have another one pending today CA 15-3.   The patient was further reviewed on 05/17/2021 along with her brother. The clinical examination has not changed. The only new complaint is the progressive amount of discomfort and the inability to function given the pain in the left hip area. Now she is limping. The only time when she is not in discomfort with the left hip is when she is sitting or lying in bed or riding the horse when gravity takes away the pressure of her left hip area. Radiologically speaking the CT scan of the chest, abdomen and pelvis to my eyes disclosed no abnormalities besides a very simple ovarian cyst that measures close to 7 x 5 cm with no trabeculation. There is  no pelvic ascites. There is no pelvic adenopathy. The other abnormality obviously visible is the severe degree of scoliosis of the thoracic, lumbar spines.     It caught my attention the subchondral cyst in the left hip and the minimal if any space leftover between the head of the femur and the acetabulum. There is no metastasis in this anatomical site.     The radiologist mentioned cardiophrenic angle lymphadenopathy. I cannot see that from my naked eyes. I do not know what it is and I do not know how to express it. Pulmonary anatomy is normal. Hilar adenopathy is normal. No pericardial effusion. No pleural effusion. Minimal infiltrate in the lungs related to radiation changes. Liver anatomy, pancreas and kidneys were unremarkable. The scoliosis is very obvious in the view of the abdomen.     Her CA 15-3 was minimal elevated in comparison to previous assessment and it raises the following question.   1. Is the cardiophrenic node described by the radiologist indeed significant of breast cancer recurrence or not? The only way to know will be to do a PET scan. This will be scheduled.   2. She is known for having an ovarian cyst for a long time but now is getting bigger, 7 cm across, and has no TABICATION with no pelvic ascites. I do not believe that this is representation of malignancy; nevertheless, I am going to run a CA-125 on her and I will proceed with a pelvic ultrasound as well as a GYN oncology referral for review.   3. I will send a notification to Dr. Leonard, the patient’s orthopedic surgeon, in regard to all her issues pertinent to the left hip. I think the patient is getting closer and closer to having a left hip replacement. 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 She recovered extremely well from her right hip surgery before.     The patient returned to the office on 05/24/2021. Since the  previous visit the patient proceeded with a PET scan and I have reviewed this with her in the PAC system showing chest wall on the left side area of enlightening SUV activity that is almost 3 cm long x 7 mm wide. It is behind the bone. It is very close to the lower aspect of her heart. The reset of the PET scan discloses no activity. This is also specific in regard to the ovaries and actually an ultrasound of her vagina looking into the ovaries documented an unilocular cyst on the left side with typical features of benignity and a  was completely normal 5.5.     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 The rest of the PET scan is very much negative normal. I would not be surprised if this is an area of the internal mammary node chain.     Obviously the ovarian cyst is benign only locular with a normal  and I find no reason to refer her to GYN oncology anymore.     Therefore my advice to her is as follows:  1. She will remain on her letrozole 2.5 mg a day.  2. She will initiate Kisqali at the usual dose 3 weeks on 1 week off making her aware of the side effects of the medicine including leukopenia, nausea, vomiting, rash, diarrhea, abnormalities in the liver function test and neutropenic fever. She will take the medicine at least for the next 6 months.  3. The patient will proceed with referral to radiation oncology to treat this area completely.  4. I am going to go ahead and make a referral to Cardiology in preparation for this site of radiation therapy and knowing that tangential fields will be difficult to produce, I think it will be important to have her to be seen by Cardiology in preparation of Kisqali use. Also I advised her that I would like for her to take a magnesium supplement and she needs to eat plenty of  nuts to minimize hypomagnesemia that can help her to prolong QT interval that is the most important side effect of Kisqali to my eyes. I advised her to continue her blood pressure medication and I advised her also to have an EKG today and also have a repeated EKG upon return in 3 weeks.    5. The patient will be having formal education and consent for Kisqali and she knows that this medicine will come to her from a speciality pharmacy.   6. The patient will require to have a repeat PET scan at least 6-8 weeks after completion of her radiation therapy to the chest wall.   7. I advised her and her brother present on the telephone of all of these events and she was ready to proceed.     The patient was further reviewed on 07/07/2021 after she has continued her Kisqali and completion of the 1st round of the medicine. Today her EKG disclosed a normal QT interval and this has been sent to Cardiology to be reported officially. 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 She has completed her radiation therapy to the epicardial right lymph node with no difficulties or side effects.     The thing that is bothering her the most is the pain in the left hip associated with advanced degenerative arthritis and she is limping substantially and having discomfort requiring to take pain medicine, Voltaren, on a regular schedule. She already has been scheduled to have her hip replaced in a few days. In preparation for this I have advised the patient the following.   1. She will discontinue her aspirin and her nonsteroidal anti-inflammatory medications a week in advance for her surgery. This will minimize the potential for bleeding.   2. The patient will hold off on the Kisqali until I review her back in the office in 6 weeks. She will require a blood count done 3 days before the surgery at the same time that she will require her official COVID test before the surgical intervention.   3. The patient will  remain on Femara for the time being at 2.5 mg a day. She has no need to stop this medication anytime besides the day of the surgery. She will resume this after the surgery and as soon as she is able to receive oral route.   4. Eventually the patient will require radiological assessment upon review in order to see what happened to the epicardial lymph node.           The patient was further reviewed on 09/30/2021. She has recovered further from the left hip surgery but she is not doing physical therapy anymore. She has a minimal limp and I advised her to go back on physical therapy on her own at home. She knows how to do the exercises and she has the afternoon off every single day.    The patient completed her Kisqali a week ago. Her white count is low today. The hemoglobin is stable. The platelet count is stable. She has had issues with the consecution of the Kisqali but the pharmacists have been working on this process with  her specialty pharmacy.     Today it caught my attention the significant bump in her liver function test, SGOT, SGPT. The patient is not drinking any alcohol. She is not taking any cholesterol medicine. She is not taking any anti-inflammatory medication and leads me to think that Kisqali is the culprit of this. Given these findings we advised the patient to put the Kisqali on hold until we recheck chemistry profile on weekly basis for the next 3 or 4 weeks. We need to settle these numbers down before she continues the Kisqali. She probably will require dose adjustment at some point. Again, her hepatitis A, B and C serologies are negative.     Instructed pharmacist to discuss these issues with her as well.     She will be called at home with the report of her CA 15-3.     I encouraged her to remain on her Femara though.     We provided her blood pressure medication. She will remain on this for the time being.     1. In regard to her history of breast cancer she was reviewed on 11/19/2021. Since  the previous visit the patient has had tumor marker CA 15-3 that has dropped to 20. Radiological assessment of her chest and abdomen CT scan that documents resolution of the epicardial lymphadenopathy in the right hemithorax, no pulmonary nodules or metastasis, no pleural effusion and no liver metastasis. No bone metastasis. Based on this information I do believe that the breast cancer is relatively quiet clinically, biochemically and radiologically and I advised her today to resume her Femara at the dose of 2.5 mg a day. The likelihood of Femara producing liver dysfunction is more than remote.   2. This patient has developed very significant abnormalities in her liver function test with persistent elevation of the SGOT, SGPT and alkaline phosphatase and normal bilirubin. 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 I have tested a multitude of liver issues including hepatitis A, B and C serologies that were negative, antitrypsin 1 that was normal, antimicrosomal antibodies that was normal, and AARON. Anti-celiac sprue panel also was done, ferritin level, copper also were done looking for Stephen's disease and hemochromatosis. All of these conditions are basically ruled out. I even went ahead and scheduled her to have a liver biopsy that documents according to the pathologist inflammatory process in the liver consistent with drug effect.    We have discontinued the Kisqali 2 months ago thinking that that was the culprit but the numbers have not improved, then we discontinued most of the other supplemental medicines that she was taking, this led the numbers to improve and the only thing that she is left taking is Voltaren. She takes the Voltaren for her arthritis. I advised her on 11/19/2021 that she must discontinue the Voltaren and hopefully this will be making her numbers in regard to liver dysfunction to improve.     We are sure that this patient is not drinking alcohol at all.    We reviewed her medication  list today and the only thing that she is taking is metoprolol and vitamin D. We asked her to remain on these medicines.     I even went ahead a couple of weeks ago to put her on a low dose prednisone to see if this had any benefit in her liver function test and it has not. I asked her today to discontinue this medicine as well.     I insisted today that the most likely reason why her liver function test is abnormal after all of the reviewing that we have done and also reviewing an alpha fetoprotein that was negative normal is drug effect. The only medicine that is leftover is antiinflammatory medication. Metoprolol doing this is kind of unexpected and she does not take Tylenol in enough amount to trigger this abnormality neither. She raised the question if she could take a Tylenol here and there and I think for now it will be okay but she will need to deal with her arthritic symptoms in a topical way or physical therapy way, acupuncture or some other methodology.     I would like for her to come to the office every couple of weeks to do a CBC and a CMP and nurse visit. I will review her back in 6 weeks with a CBC, CMP and CA 15-3. I expect that if the Voltaren is the culprit her liver function test should be normal in 6 weeks.     I will communicate all of these issues to Dar Kirkpatrick MD, who has seen her before. I do not know if he will agree with my assessment but he is welcome to pitch in with any other objection or idea.      I discussed all of these facts with the patient and her brother in the room. I went piece by piece and item by item over all of these issues and significance of each one of them. They understood this clearly.   The patient was further reviewed on 12/29/2021. Recent review of her liver function tests a few days ago documented persistent elevation of her SGOT, SGPT, and alkaline phosphatase. She has discontinued most of the medicines and I have no other choice but discontinue the  Femara. Since then and actually today is the 1st day in many weeks that the SGOT and SGPT and alkaline phosphatase are improving. The patient actually remains asymptomatic in this regard. Her liver is not enlarged. She has no jaundice. She has no rash, no skin lesions and no other new alterations. That is good news. Her white blood count, hemoglobin and platelets remain stable. 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 That is extremely rare. As I posted above, I discussed with all my partners in regard to how many times through their careers prescribing Femara to multiple breast cancers they have seen Femara triggering hepatitis, none of them gave me a positive answer. I reviewed this information in [employer] and it is reported in less than 1% of patients taking this medication. I think we are in front of a rare situation but I want for her to withhold any further Femara treatment for the time being and I want to review her back in 3 weeks. If the liver function tests continue improving or normalize by then maybe we will have the answer once and for all. Raises the question what we will do next and I think the next medicine will be the utilization of Aromasin that has a different chemical component and different methodology for action. I made her aware of that. We are not going to go back in giving her Kisqali under the present circumstances given the confusion and all the issues pertinent to her liver dysfunction.     She already has been seen by Cardiology with no new issues or commentaries by Dr. Odell and her note has been reviewed today. Actually her blood pressure today looks terrific. Her pulse is normal. Her weight is stable. Her metoprolol is still ongoing in a minimal dose.     Therefore, she will return with a CBC, CMP stat in 3 weeks and further review at that  point.      ·   · THIS IS AN ILLNESS THAT POSES A THREAT TO LIFE AND BODY FUNCTION,,  AND REQUIRES INTENSIVE MONITORING OF A LAB TEST, A PHYSIOLOGIC TEST OR IMAGING FOR DIAGNOSIS, THERAPY AND TOXICITY.

## 2021-12-30 ENCOUNTER — APPOINTMENT (OUTPATIENT)
Dept: LAB | Facility: HOSPITAL | Age: 61
End: 2021-12-30

## 2022-01-03 ENCOUNTER — APPOINTMENT (OUTPATIENT)
Dept: LAB | Facility: HOSPITAL | Age: 62
End: 2022-01-03

## 2022-01-25 ENCOUNTER — OFFICE VISIT (OUTPATIENT)
Dept: ONCOLOGY | Facility: CLINIC | Age: 62
End: 2022-01-25

## 2022-01-25 ENCOUNTER — LAB (OUTPATIENT)
Dept: LAB | Facility: HOSPITAL | Age: 62
End: 2022-01-25

## 2022-01-25 VITALS
WEIGHT: 159.6 LBS | TEMPERATURE: 97.2 F | SYSTOLIC BLOOD PRESSURE: 144 MMHG | BODY MASS INDEX: 25.65 KG/M2 | HEART RATE: 68 BPM | HEIGHT: 66 IN | RESPIRATION RATE: 16 BRPM | DIASTOLIC BLOOD PRESSURE: 87 MMHG | OXYGEN SATURATION: 98 %

## 2022-01-25 DIAGNOSIS — C50.811 MALIGNANT NEOPLASM OF OVERLAPPING SITES OF BOTH BREASTS IN FEMALE, ESTROGEN RECEPTOR POSITIVE: ICD-10-CM

## 2022-01-25 DIAGNOSIS — C50.812 MALIGNANT NEOPLASM OF OVERLAPPING SITES OF BOTH BREASTS IN FEMALE, ESTROGEN RECEPTOR POSITIVE: ICD-10-CM

## 2022-01-25 DIAGNOSIS — Z17.0 MALIGNANT NEOPLASM OF OVERLAPPING SITES OF BOTH BREASTS IN FEMALE, ESTROGEN RECEPTOR POSITIVE: ICD-10-CM

## 2022-01-25 DIAGNOSIS — K71.6 DRUG-INDUCED HEPATITIS: ICD-10-CM

## 2022-01-25 DIAGNOSIS — C50.812 MALIGNANT NEOPLASM OF OVERLAPPING SITES OF LEFT BREAST IN FEMALE, ESTROGEN RECEPTOR POSITIVE: ICD-10-CM

## 2022-01-25 DIAGNOSIS — Z17.0 MALIGNANT NEOPLASM OF OVERLAPPING SITES OF LEFT BREAST IN FEMALE, ESTROGEN RECEPTOR POSITIVE: ICD-10-CM

## 2022-01-25 DIAGNOSIS — T50.905A DRUG-INDUCED HEPATITIS: ICD-10-CM

## 2022-01-25 DIAGNOSIS — Z17.0 MALIGNANT NEOPLASM OF OVERLAPPING SITES OF LEFT BREAST IN FEMALE, ESTROGEN RECEPTOR POSITIVE: Primary | ICD-10-CM

## 2022-01-25 DIAGNOSIS — I10 PRIMARY HYPERTENSION: ICD-10-CM

## 2022-01-25 DIAGNOSIS — C50.812 MALIGNANT NEOPLASM OF OVERLAPPING SITES OF LEFT BREAST IN FEMALE, ESTROGEN RECEPTOR POSITIVE: Primary | ICD-10-CM

## 2022-01-25 LAB
ALBUMIN SERPL-MCNC: 4.3 G/DL (ref 3.5–5.2)
ALBUMIN/GLOB SERPL: 1.7 G/DL (ref 1.1–2.4)
ALP SERPL-CCNC: 126 U/L (ref 38–116)
ALT SERPL W P-5'-P-CCNC: 90 U/L (ref 0–33)
ANION GAP SERPL CALCULATED.3IONS-SCNC: 9.8 MMOL/L (ref 5–15)
AST SERPL-CCNC: 55 U/L (ref 0–32)
BASOPHILS # BLD AUTO: 0.04 10*3/MM3 (ref 0–0.2)
BASOPHILS NFR BLD AUTO: 0.9 % (ref 0–1.5)
BILIRUB SERPL-MCNC: 0.3 MG/DL (ref 0.2–1.2)
BUN SERPL-MCNC: 16 MG/DL (ref 6–20)
BUN/CREAT SERPL: 17.8 (ref 7.3–30)
CALCIUM SPEC-SCNC: 9.5 MG/DL (ref 8.5–10.2)
CANCER AG15-3 SERPL-ACNC: 19.5 U/ML
CHLORIDE SERPL-SCNC: 105 MMOL/L (ref 98–107)
CO2 SERPL-SCNC: 26.2 MMOL/L (ref 22–29)
CREAT SERPL-MCNC: 0.9 MG/DL (ref 0.6–1.1)
DEPRECATED RDW RBC AUTO: 50.7 FL (ref 37–54)
EOSINOPHIL # BLD AUTO: 0.1 10*3/MM3 (ref 0–0.4)
EOSINOPHIL NFR BLD AUTO: 2.2 % (ref 0.3–6.2)
ERYTHROCYTE [DISTWIDTH] IN BLOOD BY AUTOMATED COUNT: 14.5 % (ref 12.3–15.4)
GFR SERPL CREATININE-BSD FRML MDRD: 63 ML/MIN/1.73
GLOBULIN UR ELPH-MCNC: 2.6 GM/DL (ref 1.8–3.5)
GLUCOSE SERPL-MCNC: 91 MG/DL (ref 74–124)
HCT VFR BLD AUTO: 42.6 % (ref 34–46.6)
HGB BLD-MCNC: 13.5 G/DL (ref 12–15.9)
IMM GRANULOCYTES # BLD AUTO: 0.01 10*3/MM3 (ref 0–0.05)
IMM GRANULOCYTES NFR BLD AUTO: 0.2 % (ref 0–0.5)
LYMPHOCYTES # BLD AUTO: 0.92 10*3/MM3 (ref 0.7–3.1)
LYMPHOCYTES NFR BLD AUTO: 20.4 % (ref 19.6–45.3)
MCH RBC QN AUTO: 30 PG (ref 26.6–33)
MCHC RBC AUTO-ENTMCNC: 31.7 G/DL (ref 31.5–35.7)
MCV RBC AUTO: 94.7 FL (ref 79–97)
MONOCYTES # BLD AUTO: 0.34 10*3/MM3 (ref 0.1–0.9)
MONOCYTES NFR BLD AUTO: 7.5 % (ref 5–12)
NEUTROPHILS NFR BLD AUTO: 3.1 10*3/MM3 (ref 1.7–7)
NEUTROPHILS NFR BLD AUTO: 68.8 % (ref 42.7–76)
NRBC BLD AUTO-RTO: 0 /100 WBC (ref 0–0.2)
PLATELET # BLD AUTO: 184 10*3/MM3 (ref 140–450)
PMV BLD AUTO: 8.3 FL (ref 6–12)
POTASSIUM SERPL-SCNC: 4.2 MMOL/L (ref 3.5–4.7)
PROT SERPL-MCNC: 6.9 G/DL (ref 6.3–8)
RBC # BLD AUTO: 4.5 10*6/MM3 (ref 3.77–5.28)
SODIUM SERPL-SCNC: 141 MMOL/L (ref 134–145)
WBC NRBC COR # BLD: 4.51 10*3/MM3 (ref 3.4–10.8)

## 2022-01-25 PROCEDURE — 99214 OFFICE O/P EST MOD 30 MIN: CPT | Performed by: INTERNAL MEDICINE

## 2022-01-25 PROCEDURE — 85025 COMPLETE CBC W/AUTO DIFF WBC: CPT

## 2022-01-25 PROCEDURE — 80053 COMPREHEN METABOLIC PANEL: CPT

## 2022-01-25 PROCEDURE — 36415 COLL VENOUS BLD VENIPUNCTURE: CPT

## 2022-01-25 PROCEDURE — 86300 IMMUNOASSAY TUMOR CA 15-3: CPT | Performed by: INTERNAL MEDICINE

## 2022-01-25 RX ORDER — METOPROLOL SUCCINATE 25 MG/1
25 TABLET, EXTENDED RELEASE ORAL DAILY
Qty: 90 TABLET | Refills: 3 | Status: SHIPPED | OUTPATIENT
Start: 2022-01-25 | End: 2022-07-13 | Stop reason: HOSPADM

## 2022-01-25 NOTE — PROGRESS NOTES
Subjective     REASON FOR FOLLOW UP:  1. 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 R BREAST MASS WITH GIANT R AXILLARY ADENOPATHY.  SHE UNDERWENT DOSE DENSE AC, TAXOL, BILATERAL MASTECTOMIES, DRAMATIC NEAR COMPLETE AK, RADIATION THERAPY AND ADJUVANT FEMARA.    3. FAMILY HISTORY OF BREAST CANCER IN MOTHER AND SISTER, SISTER WAS TESTED FOR BRCA AND WAS NEGATIVE.    4. LEFT OVARIAN CYST , IT HAS BEEN PRESENT FOR LONG TIME BUT IS GETTING LARGER, ASYMPTOMATIC, NEED FOR , PELVIC US AND GYN ONC EVEAL                   5. LEFT HIP PAIN SEVERE ARTHRITIS, REAL NEED FOR LEFT HIP REPLACEMENT: PERFORMED 8/21    6. 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        DURING THE VISIT WITH THE PATIENT TODAY , PATIENT HAD FACE MASK, MY MEDICAL ASSISTANT AND I  HAD PROPPER PROTECTIVE EQUIPMENT, AND I DID HAND HYGIENE WITH SOAP AND WATER BEFORE AND AFTER THE VISIT.    This patient returns today to the office for follow up of the above diagnosis. 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 These numbers are still pending today. The patient does not feel anyway different than before with no cough, sputum production, shortness of breath, chest pain, palpitations, bone pain, joint pain. [facility] pharmacy has refused to provide her medication to control her blood pressure. They have not called for a refill appointment neither. The patient otherwise feels well. She remains active at home and her job at [employer]. She denies any other new symptoms.                 Past Medical History:   Diagnosis Date   • Anemia in neoplastic disease    • Arthritis    • Breast cancer (HCC)     Left   • CVA (cerebral vascular accident) (HCC)    • Hip pain     RIGHT HIP... CYST   • History of fracture of leg 1987   •  "History of radiation therapy     LAST TREATMENT     • Hypertension    • Limited joint range of motion (ROM)     RIGHT HIP   • Skin sore     OPEN SORE LEFT BREAST   • Syncope    • Vertigo            Past Surgical History:   Procedure Laterality Date   • AXILLARY LYMPH NODE BIOPSY/EXCISION Right     LYMPH NODE UNDER RIGHT ARM-MALIGNANT (DOUBLE MASTECTOMY)   • BREAST BIOPSY Left     MALIGNANT   • FRACTURE SURGERY  1987    Leg   • HARDWARE REMOVAL     • MASTECTOMY W/ SENTINEL NODE BIOPSY Bilateral 9/16/2019    Procedure: BILATERLA MODIFIED RADICAL MASTECTOMY WITH BILATERAL SENTINEL LYMPH NODE BIOPSY;  Surgeon: Joby Barron Jr., MD;  Location: [facility] OR;  Service: General   • TOTAL HIP ARTHROPLASTY Right 3/2/2020    Procedure: RIGHT TOTAL HIP ARTHROPLASTY NATALY NAVIGATION;  Surgeon: Luis M Leonard MD;  Location: [facility] OR;  Service: Orthopedics;  Laterality: Right;   • TOTAL HIP ARTHROPLASTY Left 7/20/2021    Procedure: Posterior LEFT TOTAL HIP ARTHROPLASTY NATALY NAVIGATION;  Surgeon: Luis M Leonard MD;  Location: [facility] OR;  Service: Orthopedics;  Laterality: Left;   • VENOUS ACCESS DEVICE (PORT) INSERTION Right 2/1/2019    Procedure: INSERTION VENOUS ACCESS DEVICE;  Surgeon: Joby Barron Jr., MD;  Location: [facility];  Service: General   • VENOUS ACCESS DEVICE (PORT) REMOVAL N/A 10/30/2019    Procedure: Mediport Removal;  Surgeon: Joby Barron Jr., MD;  Location: [facility] OR;  Service: General        Current Outpatient Medications on File Prior to Visit   Medication Sig Dispense Refill   • acetaminophen (TYLENOL) 650 MG 8 hr tablet Take 1,950 mg by mouth Every 8 (Eight) Hours As Needed.     • Cholecalciferol 250 MCG (51228 UT) tablet Take 1 tablet by mouth. Patient stated dose as \"1500 mg\"     • magnesium oxide (MAG-OX) 400 MG tablet Take 400 mg by mouth 2 (two) times a day. Patient stated dose as \"1500mg\"     • meclizine (ANTIVERT) 25 MG tablet Take 1 tablet by mouth 3 " "(Three) Times a Day As Needed for Dizziness. 90 tablet 0   • metoprolol succinate XL (TOPROL-XL) 25 MG 24 hr tablet Take 1 tablet by mouth Daily. 30 tablet 2     Current Facility-Administered Medications on File Prior to Visit   Medication Dose Route Frequency Provider Last Rate Last Admin   • Chlorhexidine Gluconate Cloth 2 % pads 1 each  1 each Apply externally Take As Directed Luis M Leonard MD            ALLERGIES:    Allergies   Allergen Reactions   • Benadryl [Diphenhydramine] Itching     INCREASES BLOOD PRESSURE   • Erythromycin GI Intolerance   • Levaquin [Levofloxacin] GI Intolerance   • Penicillins GI Intolerance        Social History     Socioeconomic History   • Marital status:      Spouse name: Cruz   • Number of children: 0   Tobacco Use   • Smoking status: Never Smoker   • Smokeless tobacco: Never Used   Vaping Use   • Vaping Use: Never used   Substance and Sexual Activity   • Alcohol use: Not Currently     Alcohol/week: 0.0 standard drinks     Comment: 2 CUPS DAILY   • Drug use: No   • Sexual activity: Not Currently     Partners: Male     Birth control/protection: Abstinence        Family History   Problem Relation Age of Onset   • Lung cancer Father    • Irritable bowel syndrome Father    • Cancer Mother    • Breast cancer Mother    • Liver disease Mother    • Breast cancer Sister 48   • Breast cancer Maternal Grandmother    • Breast cancer Paternal Grandmother    • Breast cancer Maternal Uncle    • Malig Hyperthermia Neg Hx             Objective     Vitals:    01/25/22 1357   BP: 144/87   Pulse: 68   Resp: 16   Temp: 97.2 °F (36.2 °C)   TempSrc: Temporal   SpO2: 98%   Weight: 72.4 kg (159 lb 9.6 oz)   Height: 167 cm (65.75\")   PainSc: 0-No pain     Current Status 1/25/2022   ECOG score 0     Exam:     I HAVE PERSONALLY REVIEWED THE HISTORY OF THE PRESENT ILLNESS, PAST MEDICAL HISTORY, FAMILY HISTORY, SOCIAL HISTORY, ALLERGIES, MEDICATIONS STATED ABOVE IN THE  NOTE FROM " TODAY.        GENERAL:  Well-developed, well-nourished  Patient  in no acute distress.   SKIN:  Warm, dry ,NO rashes,NO purpura ,NO petechiae.  HEENT:  Pupils were equal and reactive to light and accomodation, conjunctivae noninjected, no pterygium, normal extraocular movements, normal visual acuity.   NECK:  Supple with good range of motion; no thyromegaly , no other masses, no JVD or bruits, no cervical adenopathies.No carotid artery pain, no carotid abnormal pulsation , NO arterial dance.  LYMPHATICS:  No cervical, NO supraclavicular, NO axillary,NO epitrochlear , NO inguinal adenopathy.  CARDIAC   normal rate and regular rhythm, without murmur,NO rubs NO S3 NO S4 right or left .  LUNGS: normal breath sounds bilateral, no wheezing, rhonchi, crackles or rubs.BILATERAL MASTECTOMIES, NORMAL CHEST WALL WITH RADIATION INDUCED TELANGIECTASIAS  VASCULAR VENOUS: no cyanosis, collateral circulation, varicosities, edema, palpable cords, pain, erythema.  ABDOMEN:  Soft, nontender with no hepatomegaly, no splenomegaly,no masses, no ascites, no collateral circulation,no distention,no Vernon sign.  EXTREMITIES  AND SPINE:  No clubbing, cyanosis or edema, no deformities , no pain .No kyphosis, scoliosis, no other deformities, no pain in spine, no pain in ribs , no pain inpelvic bone.  NEUROLOGICAL:  Patient was awake, alert, oriented to time, person and place.        .            RECENT LABS:  Hematology WBC   Date Value Ref Range Status   01/25/2022 4.51 3.40 - 10.80 10*3/mm3 Final     RBC   Date Value Ref Range Status   01/25/2022 4.50 3.77 - 5.28 10*6/mm3 Final     Hemoglobin   Date Value Ref Range Status   01/25/2022 13.5 12.0 - 15.9 g/dL Final     Hematocrit   Date Value Ref Range Status   01/25/2022 42.6 34.0 - 46.6 % Final     Platelets   Date Value Ref Range Status   01/25/2022 184 140 - 450 10*3/mm3 Final              Assessment/Plan    1.  History of least for the last 4 years, she has had an in crescendo mass in the  left breast that has produced a gigantic tumor that WAS close to 25 cm in size and is replacing most of the anatomy of the left breast. There are areas of ulceration necrosis and bleeding and the mass is fixed to the chest wall. She has abundant amount of collateral circulation in the left anterior chest wall and it caught my attention the lack of any left axillary adenopathy. She has no lymphedema in the left upper extremity. In the right breast while in sitting position, no palpable abnormalities are documented. The right breast skin and nipple are normal. When the patient lies flat, there is a palpable mass at 9 o'clock from the nipple that measures probably 4 cm in size, very indistinct in regard to edges and margins. There was no mobility and no areas of tenderness. She has a giant adenopathy in the right axilla that measures close to 9 cm in size, uniform, no fluctuation, nontender, completely fixed to the axillary wall. There is no compromise of the skin.     After completion of 4 cycles of AC patient has had very dramatic improvement in all sites of disease including right axilla and left breast.    · Completed 4 cycles Adriamycin Cytoxan on 4/12/2019.    · Patient started weekly Taxol treatments on 5/3/2019.  · Completed 12 weekly Taxol treatments on 7/19/2019.  · 9/16/2019 bilateral mastectomy by Dr. Joby Barron with axillary lymph node dissection.  No residual malignancy.  · 10/30/2019 patient's Mediport was removed in anticipation of radiation.  · Radiation therapy under the care of Dr. Marmolejo 10/31/2019-1/13/2020 to the right chest wall.  · Started on adjuvant Femara 2.5 mg daily 8/20/2019.  · 9/21/2020 continues on adjuvant Femara, tolerating it quite well.  Some mild hot flashes and mild arthralgias, all which are tolerable.  I advised the patient that at this point her breast cancer remains in remission. She is 100% compliant of her medication letrozole and her clinical examination remains  negative normal for breast cancer recurrence.   The patient was further reviewed on 04/16/2021. Her clinical examination is completely negative normal and her questioning is negative in regard to symptoms that would suggest breast cancer recurrence. She is 100% compliant with her Femara. Her white count, hemoglobin and platelets remain normal. Her tumor marker during the previous visit was normal and we have another one pending today CA 15-3.   The patient was further reviewed on 05/17/2021 along with her brother. The clinical examination has not changed. The only new complaint is the progressive amount of discomfort and the inability to function given the pain in the left hip area. Now she is limping. The only time when she is not in discomfort with the left hip is when she is sitting or lying in bed or riding the horse when gravity takes away the pressure of her left hip area. Radiologically speaking the CT scan of the chest, abdomen and pelvis to my eyes disclosed no abnormalities besides a very simple ovarian cyst that measures close to 7 x 5 cm with no trabeculation. There is no pelvic ascites. There is no pelvic adenopathy. The other abnormality obviously visible is the severe degree of scoliosis of the thoracic, lumbar spines.     It caught my attention the subchondral cyst in the left hip and the minimal if any space leftover between the head of the femur and the acetabulum. There is no metastasis in this anatomical site.     The radiologist mentioned cardiophrenic angle lymphadenopathy. I cannot see that from my naked eyes. I do not know what it is and I do not know how to express it. Pulmonary anatomy is normal. Hilar adenopathy is normal. No pericardial effusion. No pleural effusion. Minimal infiltrate in the lungs related to radiation changes. Liver anatomy, pancreas and kidneys were unremarkable. The scoliosis is very obvious in the view of the abdomen.     Her CA 15-3 was minimal elevated in comparison  to previous assessment and it raises the following question.   1. Is the cardiophrenic node described by the radiologist indeed significant of breast cancer recurrence or not? The only way to know will be to do a PET scan. This will be scheduled.   2. She is known for having an ovarian cyst for a long time but now is getting bigger, 7 cm across, and has no TABICATION with no pelvic ascites. I do not believe that this is representation of malignancy; nevertheless, I am going to run a CA-125 on her and I will proceed with a pelvic ultrasound as well as a GYN oncology referral for review.   3. I will send a notification to Dr. Leonard, the patient’s orthopedic surgeon, in regard to all her issues pertinent to the left hip. I think the patient is getting closer and closer to having a left hip replacement. 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 She recovered extremely well from her right hip surgery before.     The patient returned to the office on 05/24/2021. Since the previous visit the patient proceeded with a PET scan and I have reviewed this with her in the PAC system showing chest wall on the left side area of enlightening SUV activity that is almost 3 cm long x 7 mm wide. It is behind the bone. It is very close to the lower aspect of her heart. The reset of the PET scan discloses no activity. This is also specific in regard to the ovaries and actually an ultrasound of her vagina looking into the ovaries documented an unilocular cyst on the left side with typical features of benignity and a  was completely normal 5.5.     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 The rest of the PET scan is very much negative  normal. I would not be surprised if this is an area of the internal mammary node chain.     Obviously the ovarian cyst is benign only locular with a normal  and I find no reason to refer her to GYN oncology anymore.     Therefore my advice to her is as follows:  1. She will remain on her letrozole 2.5 mg a day.  2. She will initiate Kisqali at the usual dose 3 weeks on 1 week off making her aware of the side effects of the medicine including leukopenia, nausea, vomiting, rash, diarrhea, abnormalities in the liver function test and neutropenic fever. She will take the medicine at least for the next 6 months.  3. The patient will proceed with referral to radiation oncology to treat this area completely.  4. I am going to go ahead and make a referral to Cardiology in preparation for this site of radiation therapy and knowing that tangential fields will be difficult to produce, I think it will be important to have her to be seen by Cardiology in preparation of Kisqali use. Also I advised her that I would like for her to take a magnesium supplement and she needs to eat plenty of nuts to minimize hypomagnesemia that can help her to prolong QT interval that is the most important side effect of Kisqali to my eyes. I advised her to continue her blood pressure medication and I advised her also to have an EKG today and also have a repeated EKG upon return in 3 weeks.    5. The patient will be having formal education and consent for Kisqali and she knows that this medicine will come to her from a speciality pharmacy.   6. The patient will require to have a repeat PET scan at least 6-8 weeks after completion of her radiation therapy to the chest wall.   7. I advised her and her brother present on the telephone of all of these events and she was ready to proceed.     The patient was further reviewed on 07/07/2021 after she has continued her Kisqali and completion of the 1st round of the medicine. Today her EKG disclosed a  normal QT interval and this has been sent to Cardiology to be reported officially. 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 She has completed her radiation therapy to the epicardial right lymph node with no difficulties or side effects.     The thing that is bothering her the most is the pain in the left hip associated with advanced degenerative arthritis and she is limping substantially and having discomfort requiring to take pain medicine, Voltaren, on a regular schedule. She already has been scheduled to have her hip replaced in a few days. In preparation for this I have advised the patient the following.   1. She will discontinue her aspirin and her nonsteroidal anti-inflammatory medications a week in advance for her surgery. This will minimize the potential for bleeding.   2. The patient will hold off on the Kisqali until I review her back in the office in 6 weeks. She will require a blood count done 3 days before the surgery at the same time that she will require her official COVID test before the surgical intervention.   3. The patient will remain on Femara for the time being at 2.5 mg a day. She has no need to stop this medication anytime besides the day of the surgery. She will resume this after the surgery and as soon as she is able to receive oral route.   4. Eventually the patient will require radiological assessment upon review in order to see what happened to the epicardial lymph node.           The patient was further reviewed on 09/30/2021. She has recovered further from the left hip surgery but she is not doing physical therapy anymore. She has a minimal limp and I advised her to go back on physical therapy on her own at home. She knows how to do the exercises and she has the afternoon off every single day.    The patient completed her Kisqali a week ago. Her white count is low today. The hemoglobin is stable. The platelet count is stable. She has had issues with the  consecution of the Kisqali but the pharmacists have been working on this process with  her specialty pharmacy.     Today it caught my attention the significant bump in her liver function test, SGOT, SGPT. The patient is not drinking any alcohol. She is not taking any cholesterol medicine. She is not taking any anti-inflammatory medication and leads me to think that Kisqali is the culprit of this. Given these findings we advised the patient to put the Kisqali on hold until we recheck chemistry profile on weekly basis for the next 3 or 4 weeks. We need to settle these numbers down before she continues the Kisqali. She probably will require dose adjustment at some point. Again, her hepatitis A, B and C serologies are negative.     Instructed pharmacist to discuss these issues with her as well.     She will be called at home with the report of her CA 15-3.     I encouraged her to remain on her Femara though.     We provided her blood pressure medication. She will remain on this for the time being.     1. In regard to her history of breast cancer she was reviewed on 11/19/2021. Since the previous visit the patient has had tumor marker CA 15-3 that has dropped to 20. Radiological assessment of her chest and abdomen CT scan that documents resolution of the epicardial lymphadenopathy in the right hemithorax, no pulmonary nodules or metastasis, no pleural effusion and no liver metastasis. No bone metastasis. Based on this information I do believe that the breast cancer is relatively quiet clinically, biochemically and radiologically and I advised her today to resume her Femara at the dose of 2.5 mg a day. The likelihood of Femara producing liver dysfunction is more than remote.   2. This patient has developed very significant abnormalities in her liver function test with persistent elevation of the SGOT, SGPT and alkaline phosphatase and normal bilirubin. 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 I have tested a  multitude of liver issues including hepatitis A, B and C serologies that were negative, antitrypsin 1 that was normal, antimicrosomal antibodies that was normal, and AARON. Anti-celiac sprue panel also was done, ferritin level, copper also were done looking for Stephen's disease and hemochromatosis. All of these conditions are basically ruled out. I even went ahead and scheduled her to have a liver biopsy that documents according to the pathologist inflammatory process in the liver consistent with drug effect.    We have discontinued the Kisqali 2 months ago thinking that that was the culprit but the numbers have not improved, then we discontinued most of the other supplemental medicines that she was taking, this led the numbers to improve and the only thing that she is left taking is Voltaren. She takes the Voltaren for her arthritis. I advised her on 11/19/2021 that she must discontinue the Voltaren and hopefully this will be making her numbers in regard to liver dysfunction to improve.     We are sure that this patient is not drinking alcohol at all.    We reviewed her medication list today and the only thing that she is taking is metoprolol and vitamin D. We asked her to remain on these medicines.     I even went ahead a couple of weeks ago to put her on a low dose prednisone to see if this had any benefit in her liver function test and it has not. I asked her today to discontinue this medicine as well.     I insisted today that the most likely reason why her liver function test is abnormal after all of the reviewing that we have done and also reviewing an alpha fetoprotein that was negative normal is drug effect. The only medicine that is leftover is antiinflammatory medication. Metoprolol doing this is kind of unexpected and she does not take Tylenol in enough amount to trigger this abnormality neither. She raised the question if she could take a Tylenol here and there and I think for now it will be okay but she  will need to deal with her arthritic symptoms in a topical way or physical therapy way, acupuncture or some other methodology.     I would like for her to come to the office every couple of weeks to do a CBC and a CMP and nurse visit. I will review her back in 6 weeks with a CBC, CMP and CA 15-3. I expect that if the Voltaren is the culprit her liver function test should be normal in 6 weeks.     I will communicate all of these issues to Dar Kirkpatrick MD, who has seen her before. I do not know if he will agree with my assessment but he is welcome to pitch in with any other objection or idea.      I discussed all of these facts with the patient and her brother in the room. I went piece by piece and item by item over all of these issues and significance of each one of them. They understood this clearly.   The patient was further reviewed on 12/29/2021. Recent review of her liver function tests a few days ago documented persistent elevation of her SGOT, SGPT, and alkaline phosphatase. She has discontinued most of the medicines and I have no other choice but discontinue the Femara. Since then and actually today is the 1st day in many weeks that the SGOT and SGPT and alkaline phosphatase are improving. The patient actually remains asymptomatic in this regard. Her liver is not enlarged. She has no jaundice. She has no rash, no skin lesions and no other new alterations. That is good news. Her white blood count, hemoglobin and platelets remain stable. 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 That is extremely rare. As I posted above, I discussed with all my partners in regard to how many times through their careers prescribing Femara to multiple breast cancers they have seen Femara triggering hepatitis, none of them gave me a positive answer. I reviewed this information in  [employer]To[employer] and it is reported in less than 1% of patients taking this medication. I think we are in front of a rare situation but I want for her to withhold any further Femara treatment for the time being and I want to review her back in 3 weeks. If the liver function tests continue improving or normalize by then maybe we will have the answer once and for all. Raises the question what we will do next and I think the next medicine will be the utilization of Aromasin that has a different chemical component and different methodology for action. I made her aware of that. We are not going to go back in giving her Kisqali under the present circumstances given the confusion and all the issues pertinent to her liver dysfunction.     She already has been seen by Cardiology with no new issues or commentaries by Dr. Odell and her note has been reviewed today. Actually her blood pressure today looks terrific. Her pulse is normal. Her weight is stable. Her metoprolol is still ongoing in a minimal dose.     Therefore, she will return with a CBC, CMP stat in 3 weeks and further review at that point.  She was further reviewed on 01/25/2022. Her clinical examination is negative for metastasis, her liver is not enlarged and she has no jaundice. She discontinued her Femara close to 6 weeks ago and I am expecting to review her liver function test today. Also we requested tumor markers. They have been within normal limits.     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 She is advised to undergo therapy with Aromasin at the dose of 25 mg a day. She will require to be reassessed in 1 month.     We are not going to reestablish Kisqali therapy until we are sure that her liver is going to be able to handle all of the medications at this point.    In regard to her blood pressure control we are going to send prescription to her pharmacy to have a year of refill on this medication. If the patient needs to go back on  aspirin or not remains to be seen until we review her liver function test as well. She remains on her vitamin D supplement and she is taking minimal amount of Tylenol for pain.     I will review her back in a month with a CBC, CMP and a CA 15-3.     I discussed all of these facts with the patient and her brother present in the room.         ·   · THIS IS AN ILLNESS THAT POSES A THREAT TO LIFE AND BODY FUNCTION,,  AND REQUIRES INTENSIVE MONITORING OF A LAB TEST, A PHYSIOLOGIC TEST OR IMAGING FOR DIAGNOSIS, THERAPY AND TOXICITY.

## 2022-01-26 ENCOUNTER — TELEPHONE (OUTPATIENT)
Dept: ONCOLOGY | Facility: CLINIC | Age: 62
End: 2022-01-26

## 2022-01-26 RX ORDER — EXEMESTANE 25 MG/1
25 TABLET ORAL DAILY
Qty: 30 TABLET | Refills: 3 | Status: SHIPPED | OUTPATIENT
Start: 2022-01-26 | End: 2022-05-26 | Stop reason: SDUPTHER

## 2022-01-26 NOTE — TELEPHONE ENCOUNTER
Informed pt of improved liver function and stable tumor markers. Pt will start aromasin in two weeks. Sending to pharmacy now. Pharmacy verified with pt. Pt v/u NOT TO START FOR TWO WEEKS. Ekta Gonzalez RN

## 2022-02-17 ENCOUNTER — DOCUMENTATION (OUTPATIENT)
Dept: PHARMACY | Facility: HOSPITAL | Age: 62
End: 2022-02-17

## 2022-02-17 NOTE — PROGRESS NOTES
I contacted [facility] to check that pt did  her Exemestane as she was supposed to start 2 weeks after her 1/26/22 visit. I spoke to Maria Luisa who states pt did pick this up and it went through insurance for no charge.    Estelle Emmanuel  Specialty Pharmacy Technician

## 2022-02-23 ENCOUNTER — SPECIALTY PHARMACY (OUTPATIENT)
Dept: PHARMACY | Facility: HOSPITAL | Age: 62
End: 2022-02-23

## 2022-02-23 NOTE — PROGRESS NOTES
Specialty Note      Labs reviewed        1/25/2022   WBC 3.40 - 10.80 10*3/mm3 4.51   Neutrophils Absolute 1.70 - 7.00 10*3/mm3 3.10   Hemoglobin 12.0 - 15.9 g/dL 13.5   Hematocrit 34.0 - 46.6 % 42.6   Platelets 140 - 450 10*3/mm3 184   Creatinine 0.60 - 1.10 mg/dL 0.90   eGFR Non African Am >60 mL/min/1.73 63   BUN 6 - 20 mg/dL 16   Sodium 134 - 145 mmol/L 141   Potassium 3.5 - 4.7 mmol/L 4.2   Glucose 74 - 124 mg/dL 91   Calcium 8.5 - 10.2 mg/dL 9.5   Albumin 3.50 - 5.20 g/dL 4.30   Total Protein 6.3 - 8.0 g/dL 6.9   AST (SGOT) 0 - 32 U/L 55 (A)   ALT (SGPT) 0 - 33 U/L 90 (A)   Alkaline Phosphatase 38 - 116 U/L 126 (A)   Total Bilirubin 0.2 - 1.2 mg/dL 0.3     Dr Dixon dictation is noted    · 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 She is advised to undergo therapy with Aromasin at the dose of 25 mg a day. She will require to be reassessed in 1 month.      We are not going to reestablish Kisqali therapy until we are sure that her liver is going to be able to handle all of the medications at this point.

## 2022-03-07 ENCOUNTER — LAB (OUTPATIENT)
Dept: LAB | Facility: HOSPITAL | Age: 62
End: 2022-03-07

## 2022-03-07 ENCOUNTER — OFFICE VISIT (OUTPATIENT)
Dept: ONCOLOGY | Facility: CLINIC | Age: 62
End: 2022-03-07

## 2022-03-07 VITALS
HEIGHT: 66 IN | TEMPERATURE: 96.9 F | BODY MASS INDEX: 26.16 KG/M2 | OXYGEN SATURATION: 98 % | SYSTOLIC BLOOD PRESSURE: 115 MMHG | WEIGHT: 162.8 LBS | DIASTOLIC BLOOD PRESSURE: 84 MMHG | RESPIRATION RATE: 16 BRPM | HEART RATE: 89 BPM

## 2022-03-07 DIAGNOSIS — C50.812 MALIGNANT NEOPLASM OF OVERLAPPING SITES OF LEFT BREAST IN FEMALE, ESTROGEN RECEPTOR POSITIVE: ICD-10-CM

## 2022-03-07 DIAGNOSIS — T50.905A DRUG-INDUCED HEPATITIS: ICD-10-CM

## 2022-03-07 DIAGNOSIS — Z17.0 MALIGNANT NEOPLASM OF OVERLAPPING SITES OF LEFT BREAST IN FEMALE, ESTROGEN RECEPTOR POSITIVE: ICD-10-CM

## 2022-03-07 DIAGNOSIS — K71.6 DRUG-INDUCED HEPATITIS: ICD-10-CM

## 2022-03-07 DIAGNOSIS — C50.812 MALIGNANT NEOPLASM OF OVERLAPPING SITES OF BOTH BREASTS IN FEMALE, ESTROGEN RECEPTOR POSITIVE: ICD-10-CM

## 2022-03-07 DIAGNOSIS — C50.812 MALIGNANT NEOPLASM OF OVERLAPPING SITES OF BOTH BREASTS IN FEMALE, ESTROGEN RECEPTOR POSITIVE: Primary | ICD-10-CM

## 2022-03-07 DIAGNOSIS — I10 PRIMARY HYPERTENSION: ICD-10-CM

## 2022-03-07 DIAGNOSIS — C50.811 MALIGNANT NEOPLASM OF OVERLAPPING SITES OF BOTH BREASTS IN FEMALE, ESTROGEN RECEPTOR POSITIVE: Primary | ICD-10-CM

## 2022-03-07 DIAGNOSIS — Z17.0 MALIGNANT NEOPLASM OF OVERLAPPING SITES OF BOTH BREASTS IN FEMALE, ESTROGEN RECEPTOR POSITIVE: ICD-10-CM

## 2022-03-07 DIAGNOSIS — Z17.0 MALIGNANT NEOPLASM OF OVERLAPPING SITES OF BOTH BREASTS IN FEMALE, ESTROGEN RECEPTOR POSITIVE: Primary | ICD-10-CM

## 2022-03-07 DIAGNOSIS — Z79.899 HIGH RISK MEDICATION USE: ICD-10-CM

## 2022-03-07 DIAGNOSIS — C50.811 MALIGNANT NEOPLASM OF OVERLAPPING SITES OF BOTH BREASTS IN FEMALE, ESTROGEN RECEPTOR POSITIVE: ICD-10-CM

## 2022-03-07 LAB
ALBUMIN SERPL-MCNC: 4.4 G/DL (ref 3.5–5.2)
ALBUMIN/GLOB SERPL: 1.8 G/DL (ref 1.1–2.4)
ALP SERPL-CCNC: 107 U/L (ref 38–116)
ALT SERPL W P-5'-P-CCNC: 27 U/L (ref 0–33)
ANION GAP SERPL CALCULATED.3IONS-SCNC: 10.1 MMOL/L (ref 5–15)
AST SERPL-CCNC: 26 U/L (ref 0–32)
BASOPHILS # BLD AUTO: 0.04 10*3/MM3 (ref 0–0.2)
BASOPHILS NFR BLD AUTO: 0.7 % (ref 0–1.5)
BILIRUB SERPL-MCNC: 0.4 MG/DL (ref 0.2–1.2)
BUN SERPL-MCNC: 21 MG/DL (ref 6–20)
BUN/CREAT SERPL: 19.1 (ref 7.3–30)
CALCIUM SPEC-SCNC: 9.7 MG/DL (ref 8.5–10.2)
CANCER AG15-3 SERPL-ACNC: 16.4 U/ML
CHLORIDE SERPL-SCNC: 104 MMOL/L (ref 98–107)
CO2 SERPL-SCNC: 26.9 MMOL/L (ref 22–29)
CREAT SERPL-MCNC: 1.1 MG/DL (ref 0.6–1.1)
DEPRECATED RDW RBC AUTO: 49.7 FL (ref 37–54)
EGFRCR SERPLBLD CKD-EPI 2021: 56.2 ML/MIN/1.73
EOSINOPHIL # BLD AUTO: 0.07 10*3/MM3 (ref 0–0.4)
EOSINOPHIL NFR BLD AUTO: 1.3 % (ref 0.3–6.2)
ERYTHROCYTE [DISTWIDTH] IN BLOOD BY AUTOMATED COUNT: 14.3 % (ref 12.3–15.4)
GLOBULIN UR ELPH-MCNC: 2.5 GM/DL (ref 1.8–3.5)
GLUCOSE SERPL-MCNC: 94 MG/DL (ref 74–124)
HCT VFR BLD AUTO: 44 % (ref 34–46.6)
HGB BLD-MCNC: 14.1 G/DL (ref 12–15.9)
IMM GRANULOCYTES # BLD AUTO: 0.01 10*3/MM3 (ref 0–0.05)
IMM GRANULOCYTES NFR BLD AUTO: 0.2 % (ref 0–0.5)
LYMPHOCYTES # BLD AUTO: 1.09 10*3/MM3 (ref 0.7–3.1)
LYMPHOCYTES NFR BLD AUTO: 20.3 % (ref 19.6–45.3)
MCH RBC QN AUTO: 30.4 PG (ref 26.6–33)
MCHC RBC AUTO-ENTMCNC: 32 G/DL (ref 31.5–35.7)
MCV RBC AUTO: 94.8 FL (ref 79–97)
MONOCYTES # BLD AUTO: 0.32 10*3/MM3 (ref 0.1–0.9)
MONOCYTES NFR BLD AUTO: 5.9 % (ref 5–12)
NEUTROPHILS NFR BLD AUTO: 3.85 10*3/MM3 (ref 1.7–7)
NEUTROPHILS NFR BLD AUTO: 71.6 % (ref 42.7–76)
NRBC BLD AUTO-RTO: 0 /100 WBC (ref 0–0.2)
PLATELET # BLD AUTO: 171 10*3/MM3 (ref 140–450)
PMV BLD AUTO: 8.3 FL (ref 6–12)
POTASSIUM SERPL-SCNC: 4.2 MMOL/L (ref 3.5–4.7)
PROT SERPL-MCNC: 6.9 G/DL (ref 6.3–8)
RBC # BLD AUTO: 4.64 10*6/MM3 (ref 3.77–5.28)
SODIUM SERPL-SCNC: 141 MMOL/L (ref 134–145)
WBC NRBC COR # BLD: 5.38 10*3/MM3 (ref 3.4–10.8)

## 2022-03-07 PROCEDURE — 99214 OFFICE O/P EST MOD 30 MIN: CPT | Performed by: INTERNAL MEDICINE

## 2022-03-07 PROCEDURE — 80053 COMPREHEN METABOLIC PANEL: CPT

## 2022-03-07 PROCEDURE — 85025 COMPLETE CBC W/AUTO DIFF WBC: CPT

## 2022-03-07 PROCEDURE — 86300 IMMUNOASSAY TUMOR CA 15-3: CPT | Performed by: INTERNAL MEDICINE

## 2022-03-07 PROCEDURE — 36415 COLL VENOUS BLD VENIPUNCTURE: CPT

## 2022-03-07 NOTE — PROGRESS NOTES
Subjective     REASON FOR FOLLOW UP:  1. 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 R BREAST MASS WITH GIANT R AXILLARY ADENOPATHY.  SHE UNDERWENT DOSE DENSE AC, TAXOL, BILATERAL MASTECTOMIES, DRAMATIC NEAR COMPLETE SC, RADIATION THERAPY AND ADJUVANT FEMARA STOPPED ON 12/21 BECAUSE DRUG INDUCED HEPATITIS, SWITCHED TO AROMASIN 2/22: NO SIDE EFFECTS.    3. FAMILY HISTORY OF BREAST CANCER IN MOTHER AND SISTER, SISTER WAS TESTED FOR BRCA AND WAS NEGATIVE.    4. LEFT OVARIAN CYST , IT HAS BEEN PRESENT FOR LONG TIME BUT IS GETTING LARGER, ASYMPTOMATIC, NEED FOR , PELVIC US AND GYN ONC EVEAL                   5. LEFT HIP PAIN SEVERE ARTHRITIS, REAL NEED FOR LEFT HIP REPLACEMENT: PERFORMED 8/21    6. 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        DURING THE VISIT WITH THE PATIENT TODAY , PATIENT HAD FACE MASK, MY MEDICAL ASSISTANT AND I  HAD PROPPER PROTECTIVE EQUIPMENT, AND I DID HAND HYGIENE WITH SOAP AND WATER BEFORE AND AFTER THE VISIT.  This patient returns today to the office for follow up in company of her brother. Since the previous visit the patient was initiated on Aromasin after we documented the dramatic improvement in her liver function test after Femara was discontinued. As we thought before that was the last medicine that we thought could produce chemical hepatitis. Indeed that was the case. Since the patient has been on Aromasin she has been experiencing some hot flashes but other than that she feels well. She is going to initiate her work at [email or web address] in the next week or so. She is not physically fit anymore and she is complaining about that. Her appetite is tremendous. She has no abdominal pain, nausea, vomiting, jaundice, abdominal distention or changes in bowel activity. Urination is normal. She has no bone pain besides her chronic back pain and her  arthritic pain in the hip areas where she had a hip replacement. She is requiring Tylenol once a day. She has no cough, shortness of breath, no palpitations. She continues taking her metoprolol to control her blood pressure. She feels well though otherwise.                 Past Medical History:   Diagnosis Date   • Anemia in neoplastic disease    • Arthritis    • Breast cancer (HCC)     Left   • CVA (cerebral vascular accident) (HCC)    • Hip pain     RIGHT HIP... CYST   • History of fracture of leg 1987   • History of radiation therapy     LAST TREATMENT     • Hypertension    • Limited joint range of motion (ROM)     RIGHT HIP   • Skin sore     OPEN SORE LEFT BREAST   • Syncope    • Vertigo            Past Surgical History:   Procedure Laterality Date   • AXILLARY LYMPH NODE BIOPSY/EXCISION Right     LYMPH NODE UNDER RIGHT ARM-MALIGNANT (DOUBLE MASTECTOMY)   • BREAST BIOPSY Left     MALIGNANT   • FRACTURE SURGERY  1987    Leg   • HARDWARE REMOVAL     • MASTECTOMY W/ SENTINEL NODE BIOPSY Bilateral 9/16/2019    Procedure: BILATERLA MODIFIED RADICAL MASTECTOMY WITH BILATERAL SENTINEL LYMPH NODE BIOPSY;  Surgeon: Joby Barron Jr., MD;  Location: [facility];  Service: General   • TOTAL HIP ARTHROPLASTY Right 3/2/2020    Procedure: RIGHT TOTAL HIP ARTHROPLASTY NATALY NAVIGATION;  Surgeon: Luis M Leonard MD;  Location: [facility] OR;  Service: Orthopedics;  Laterality: Right;   • TOTAL HIP ARTHROPLASTY Left 7/20/2021    Procedure: Posterior LEFT TOTAL HIP ARTHROPLASTY NATALY NAVIGATION;  Surgeon: Luis M Leonard MD;  Location: [facility] OR;  Service: Orthopedics;  Laterality: Left;   • VENOUS ACCESS DEVICE (PORT) INSERTION Right 2/1/2019    Procedure: INSERTION VENOUS ACCESS DEVICE;  Surgeon: Joby Barron Jr., MD;  Location: [facility];  Service: General   • VENOUS ACCESS DEVICE (PORT) REMOVAL N/A 10/30/2019    Procedure: Mediport Removal;  Surgeon: Joby Barron Jr., MD;  Location: [facility]  "MAIN OR;  Service: General        Current Outpatient Medications on File Prior to Visit   Medication Sig Dispense Refill   • acetaminophen (TYLENOL) 650 MG 8 hr tablet Take 1,950 mg by mouth Every 8 (Eight) Hours As Needed.     • Cholecalciferol 250 MCG (48725 UT) tablet Take 1 tablet by mouth. Patient stated dose as \"1500 mg\"     • exemestane (Aromasin) 25 MG chemo tablet Take 1 tablet by mouth Daily. Begin in two weeks 30 tablet 3   • magnesium oxide (MAG-OX) 400 MG tablet Take 400 mg by mouth 2 (two) times a day. Patient stated dose as \"1500mg\"     • meclizine (ANTIVERT) 25 MG tablet Take 1 tablet by mouth 3 (Three) Times a Day As Needed for Dizziness. 90 tablet 0   • metoprolol succinate XL (TOPROL-XL) 25 MG 24 hr tablet Take 1 tablet by mouth Daily. 90 tablet 3     Current Facility-Administered Medications on File Prior to Visit   Medication Dose Route Frequency Provider Last Rate Last Admin   • Chlorhexidine Gluconate Cloth 2 % pads 1 each  1 each Apply externally Take As Directed Luis M Leonard MD            ALLERGIES:    Allergies   Allergen Reactions   • Benadryl [Diphenhydramine] Itching     INCREASES BLOOD PRESSURE   • Erythromycin GI Intolerance   • Levaquin [Levofloxacin] GI Intolerance   • Penicillins GI Intolerance        Social History     Socioeconomic History   • Marital status:      Spouse name: Cruz   • Number of children: 0   Tobacco Use   • Smoking status: Never Smoker   • Smokeless tobacco: Never Used   Vaping Use   • Vaping Use: Never used   Substance and Sexual Activity   • Alcohol use: Not Currently     Alcohol/week: 0.0 standard drinks     Comment: 2 CUPS DAILY   • Drug use: No   • Sexual activity: Not Currently     Partners: Male     Birth control/protection: Abstinence        Family History   Problem Relation Age of Onset   • Lung cancer Father    • Irritable bowel syndrome Father    • Cancer Mother    • Breast cancer Mother    • Liver disease Mother    • Breast cancer Sister " "48   • Breast cancer Maternal Grandmother    • Breast cancer Paternal Grandmother    • Breast cancer Maternal Uncle    • Malig Hyperthermia Neg Hx             Objective     Vitals:    03/07/22 0958   BP: 115/84   Pulse: 89   Resp: 16   Temp: 96.9 °F (36.1 °C)   TempSrc: Temporal   SpO2: 98%   Weight: 73.8 kg (162 lb 12.8 oz)   Height: 167 cm (65.75\")   PainSc: 0-No pain     Current Status 3/7/2022   ECOG score 0     Exam:   I HAVE PERSONALLY REVIEWED THE HISTORY OF THE PRESENT ILLNESS, PAST MEDICAL HISTORY, FAMILY HISTORY, SOCIAL HISTORY, ALLERGIES, MEDICATIONS STATED ABOVE IN THE  NOTE FROM TODAY.        GENERAL:  Well-developed, well-nourished  Patient  in no acute distress.   SKIN:  Warm, dry ,NO purpura ,NO petechiae, no rash.  HEENT:  Pupils were equal and reactive to light and accomodation, conjunctivae noninjected, no pterygium, normal extraocular movements, normal visual acuity.   NECK:  Supple with good range of motion; no thyromegaly , no other masses, no JVD or bruits,.No carotid artery pain, no carotid abnormal pulsation , NO arterial dance.  LYMPHATICS:  No cervical, NO supraclavicular, NO axillary,NO epitrochlear , NO inguinal adenopathies.  CARDIAC   normal rate , regular rhythm, without murmur,NO rubs NO S3 NO S4 CHEST WALL : TELANGIECTASIAS FROM RADIATION THERAPY NO MASSES OR NODULES  LUNGS: normal breath sounds bilateral, no wheezing, NO rhonchi, NO crackles ,NO rubs.  VASCULAR VENOUS: no cyanosis, NO collateral circulation, NO varicosities, NO edema, NO palpable cords, NO pain,NO erythema, NO pigmentation of the skin.  ABDOMEN:  Soft, NO pain,no hepatomegaly, no splenomegaly,no masses, no ascites, no collateral circulation,no distention,no [place] sign.  EXTREMITIES  AND SPINE:  No clubbing, no cyanosis ,OA HANDS deformities , no pain .No kyphosis,  no pain in spine, no pain in ribs , no pain in pelvic bone.  NEUROLOGICAL:  Patient was awake, alert, oriented to time, person and " place.          .            RECENT LABS:  Hematology WBC   Date Value Ref Range Status   03/07/2022 5.38 3.40 - 10.80 10*3/mm3 Final     RBC   Date Value Ref Range Status   03/07/2022 4.64 3.77 - 5.28 10*6/mm3 Final     Hemoglobin   Date Value Ref Range Status   03/07/2022 14.1 12.0 - 15.9 g/dL Final     Hematocrit   Date Value Ref Range Status   03/07/2022 44.0 34.0 - 46.6 % Final     Platelets   Date Value Ref Range Status   03/07/2022 171 140 - 450 10*3/mm3 Final        Component   Ref Range & Units 08:53   (3/7/22) 1 mo ago   (1/25/22) 2 mo ago   (12/29/21) 2 mo ago   (12/17/21) 3 mo ago   (12/3/21) 3 mo ago   (11/19/21) 3 mo ago   (11/10/21)   Glucose   74 - 124 mg/dL 94  91  120  89  103  131 High   78    BUN   6 - 20 mg/dL 21 High   16  17  17  14  22 High   23 High     Creatinine   0.60 - 1.10 mg/dL 1.10  0.90  0.93  0.90  0.88  0.85  0.90    Sodium   134 - 145 mmol/L 141  141  141  140  140  139  139    Potassium   3.5 - 4.7 mmol/L 4.2  4.2  3.9  4.2  4.4  4.3  3.8    Chloride   98 - 107 mmol/L 104  105  103  104  105  102  105    CO2   22.0 - 29.0 mmol/L 26.9  26.2  28.1  24.8  26.2  26.1  24.4    Calcium   8.5 - 10.2 mg/dL 9.7  9.5  9.9  9.9  9.6  9.9  9.4    Total Protein   6.3 - 8.0 g/dL 6.9  6.9  7.0  7.3  6.7  7.3  6.5    Albumin   3.50 - 5.20 g/dL 4.40  4.30  4.50  4.70  4.40  4.60  4.30    ALT (SGPT)   0 - 33 U/L 27  90 High   326 High Critical   456 High Critical   403 High Critical   442 High Critical   464 High Critical     AST (SGOT)   0 - 32 U/L 26  55 High   167 High Critical   239 High Critical   208 High Critical   186 High Critical   183 High Critical     Alkaline Phosphatase   38 - 116 U/L 107  126 High   133 High   140 High   150 High   157 High   158 High     Total Bilirubin   0.2 - 1.2 mg/dL 0.4  0.3  0.6  0.6  0.3  0.5  0.3    Globulin   1.8 - 3.5 gm/dL 2.5  2.6  2.5  2.6  2.3  2.7  2.2    A/G Ratio   1.1 - 2.4 g/dL 1.8  1.7  1.8  1.8  1.9  1.7  2.0    BUN/Creatinine Ratio   7.3 -  30.0 19.1  17.8  18.3  18.9  15.9  25.9  25.6    Anion Gap   5.0 - 15.0 mmol/L 10.1  9.8  9.9  11.2  8.             Assessment/Plan    1.  History of least for the last 4 years, she has had an in crescendo mass in the left breast that has produced a gigantic tumor that WAS close to 25 cm in size and is replacing most of the anatomy of the left breast. There are areas of ulceration necrosis and bleeding and the mass is fixed to the chest wall. She has abundant amount of collateral circulation in the left anterior chest wall and it caught my attention the lack of any left axillary adenopathy. She has no lymphedema in the left upper extremity. In the right breast while in sitting position, no palpable abnormalities are documented. The right breast skin and nipple are normal. When the patient lies flat, there is a palpable mass at 9 o'clock from the nipple that measures probably 4 cm in size, very indistinct in regard to edges and margins. There was no mobility and no areas of tenderness. She has a giant adenopathy in the right axilla that measures close to 9 cm in size, uniform, no fluctuation, nontender, completely fixed to the axillary wall. There is no compromise of the skin.     After completion of 4 cycles of AC patient has had very dramatic improvement in all sites of disease including right axilla and left breast.    · Completed 4 cycles Adriamycin Cytoxan on 4/12/2019.    · Patient started weekly Taxol treatments on 5/3/2019.  · Completed 12 weekly Taxol treatments on 7/19/2019.  · 9/16/2019 bilateral mastectomy by Dr. Joby Barron with axillary lymph node dissection.  No residual malignancy.  · 10/30/2019 patient's Mediport was removed in anticipation of radiation.  · Radiation therapy under the care of Dr. Marmolejo 10/31/2019-1/13/2020 to the right chest wall.  · Started on adjuvant Femara 2.5 mg daily 8/20/2019.  · 9/21/2020 continues on adjuvant Femara, tolerating it quite well.  Some mild hot flashes and  mild arthralgias, all which are tolerable.  I advised the patient that at this point her breast cancer remains in remission. She is 100% compliant of her medication letrozole and her clinical examination remains negative normal for breast cancer recurrence.   The patient was further reviewed on 04/16/2021. Her clinical examination is completely negative normal and her questioning is negative in regard to symptoms that would suggest breast cancer recurrence. She is 100% compliant with her Femara. Her white count, hemoglobin and platelets remain normal. Her tumor marker during the previous visit was normal and we have another one pending today CA 15-3.   The patient was further reviewed on 05/17/2021 along with her brother. The clinical examination has not changed. The only new complaint is the progressive amount of discomfort and the inability to function given the pain in the left hip area. Now she is limping. The only time when she is not in discomfort with the left hip is when she is sitting or lying in bed or riding the horse when gravity takes away the pressure of her left hip area. Radiologically speaking the CT scan of the chest, abdomen and pelvis to my eyes disclosed no abnormalities besides a very simple ovarian cyst that measures close to 7 x 5 cm with no trabeculation. There is no pelvic ascites. There is no pelvic adenopathy. The other abnormality obviously visible is the severe degree of scoliosis of the thoracic, lumbar spines.     It caught my attention the subchondral cyst in the left hip and the minimal if any space leftover between the head of the femur and the acetabulum. There is no metastasis in this anatomical site.     The radiologist mentioned cardiophrenic angle lymphadenopathy. I cannot see that from my naked eyes. I do not know what it is and I do not know how to express it. Pulmonary anatomy is normal. Hilar adenopathy is normal. No pericardial effusion. No pleural effusion. Minimal  infiltrate in the lungs related to radiation changes. Liver anatomy, pancreas and kidneys were unremarkable. The scoliosis is very obvious in the view of the abdomen.     Her CA 15-3 was minimal elevated in comparison to previous assessment and it raises the following question.   1. Is the cardiophrenic node described by the radiologist indeed significant of breast cancer recurrence or not? The only way to know will be to do a PET scan. This will be scheduled.   2. She is known for having an ovarian cyst for a long time but now is getting bigger, 7 cm across, and has no TABICATION with no pelvic ascites. I do not believe that this is representation of malignancy; nevertheless, I am going to run a CA-125 on her and I will proceed with a pelvic ultrasound as well as a GYN oncology referral for review.   3. I will send a notification to Dr. Leonard, the patient’s orthopedic surgeon, in regard to all her issues pertinent to the left hip. I think the patient is getting closer and closer to having a left hip replacement. 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 She recovered extremely well from her right hip surgery before.     The patient returned to the office on 05/24/2021. Since the previous visit the patient proceeded with a PET scan and I have reviewed this with her in the PAC system showing chest wall on the left side area of enlightening SUV activity that is almost 3 cm long x 7 mm wide. It is behind the bone. It is very close to the lower aspect of her heart. The reset of the PET scan discloses no activity. This is also specific in regard to the ovaries and actually an ultrasound of her vagina looking into the ovaries documented an unilocular cyst on the left side with typical features of benignity and a  was completely normal 5.5.     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 The rest of the PET scan is very much negative normal. I would not be surprised if this is an area of the internal mammary node chain.     Obviously the ovarian cyst is benign only locular with a normal  and I find no reason to refer her to GYN oncology anymore.     Therefore my advice to her is as follows:  1. She will remain on her letrozole 2.5 mg a day.  2. She will initiate Kisqali at the usual dose 3 weeks on 1 week off making her aware of the side effects of the medicine including leukopenia, nausea, vomiting, rash, diarrhea, abnormalities in the liver function test and neutropenic fever. She will take the medicine at least for the next 6 months.  3. The patient will proceed with referral to radiation oncology to treat this area completely.  4. I am going to go ahead and make a referral to Cardiology in preparation for this site of radiation therapy and knowing that tangential fields will be difficult to produce, I think it will be important to have her to be seen by Cardiology in preparation of Kisqali use. Also I advised her that I would like for her to take a magnesium supplement and she needs to eat plenty of nuts to minimize hypomagnesemia that can help her to prolong QT interval that is the most important side effect of Kisqali to my eyes. I advised her to continue her blood pressure medication and I advised her also to have an EKG today and also have a repeated EKG upon return in 3 weeks.    5. The patient will be having formal education and consent for Kisqali and she knows that this medicine will come to her from a speciality pharmacy.   6. The patient will require to have a repeat PET scan at least 6-8 weeks after completion of her radiation therapy to the chest wall.   7. I advised her and her brother present on the telephone  of all of these events and she was ready to proceed.     The patient was further reviewed on 07/07/2021 after she has continued her Kisqali and completion of the 1st round of the medicine. Today her EKG disclosed a normal QT interval and this has been sent to Cardiology to be reported officially. 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 She has completed her radiation therapy to the epicardial right lymph node with no difficulties or side effects.     The thing that is bothering her the most is the pain in the left hip associated with advanced degenerative arthritis and she is limping substantially and having discomfort requiring to take pain medicine, Voltaren, on a regular schedule. She already has been scheduled to have her hip replaced in a few days. In preparation for this I have advised the patient the following.   1. She will discontinue her aspirin and her nonsteroidal anti-inflammatory medications a week in advance for her surgery. This will minimize the potential for bleeding.   2. The patient will hold off on the Kisqali until I review her back in the office in 6 weeks. She will require a blood count done 3 days before the surgery at the same time that she will require her official COVID test before the surgical intervention.   3. The patient will remain on Femara for the time being at 2.5 mg a day. She has no need to stop this medication anytime besides the day of the surgery. She will resume this after the surgery and as soon as she is able to receive oral route.   4. Eventually the patient will require radiological assessment upon review in order to see what happened to the epicardial lymph node.           The patient was further reviewed on 09/30/2021. She has recovered further from the left hip surgery but she is not doing physical therapy anymore. She has a minimal limp and I advised her to go back on physical therapy on her own at home. She knows how to do the  exercises and she has the afternoon off every single day.    The patient completed her Kisqali a week ago. Her white count is low today. The hemoglobin is stable. The platelet count is stable. She has had issues with the consecution of the Kisqali but the pharmacists have been working on this process with  her specialty pharmacy.     Today it caught my attention the significant bump in her liver function test, SGOT, SGPT. The patient is not drinking any alcohol. She is not taking any cholesterol medicine. She is not taking any anti-inflammatory medication and leads me to think that Kisqali is the culprit of this. Given these findings we advised the patient to put the Kisqali on hold until we recheck chemistry profile on weekly basis for the next 3 or 4 weeks. We need to settle these numbers down before she continues the Kisqali. She probably will require dose adjustment at some point. Again, her hepatitis A, B and C serologies are negative.     Instructed pharmacist to discuss these issues with her as well.     She will be called at home with the report of her CA 15-3.     I encouraged her to remain on her Femara though.     We provided her blood pressure medication. She will remain on this for the time being.     1. In regard to her history of breast cancer she was reviewed on 11/19/2021. Since the previous visit the patient has had tumor marker CA 15-3 that has dropped to 20. Radiological assessment of her chest and abdomen CT scan that documents resolution of the epicardial lymphadenopathy in the right hemithorax, no pulmonary nodules or metastasis, no pleural effusion and no liver metastasis. No bone metastasis. Based on this information I do believe that the breast cancer is relatively quiet clinically, biochemically and radiologically and I advised her today to resume her Femara at the dose of 2.5 mg a day. The likelihood of Femara producing liver dysfunction is more than remote.   2. This patient has  developed very significant abnormalities in her liver function test with persistent elevation of the SGOT, SGPT and alkaline phosphatase and normal bilirubin. 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 I have tested a multitude of liver issues including hepatitis A, B and C serologies that were negative, antitrypsin 1 that was normal, antimicrosomal antibodies that was normal, and AARON. Anti-celiac sprue panel also was done, ferritin level, copper also were done looking for Stephen's disease and hemochromatosis. All of these conditions are basically ruled out. I even went ahead and scheduled her to have a liver biopsy that documents according to the pathologist inflammatory process in the liver consistent with drug effect.    We have discontinued the Kisqali 2 months ago thinking that that was the culprit but the numbers have not improved, then we discontinued most of the other supplemental medicines that she was taking, this led the numbers to improve and the only thing that she is left taking is Voltaren. She takes the Voltaren for her arthritis. I advised her on 11/19/2021 that she must discontinue the Voltaren and hopefully this will be making her numbers in regard to liver dysfunction to improve.     We are sure that this patient is not drinking alcohol at all.    We reviewed her medication list today and the only thing that she is taking is metoprolol and vitamin D. We asked her to remain on these medicines.     I even went ahead a couple of weeks ago to put her on a low dose prednisone to see if this had any benefit in her liver function test and it has not. I asked her today to discontinue this medicine as well.     I insisted today that the most likely reason why her liver function test is abnormal after all of the reviewing that we have done and also reviewing an alpha fetoprotein that was negative normal is drug effect. The only medicine that is leftover is antiinflammatory medication. Metoprolol  doing this is kind of unexpected and she does not take Tylenol in enough amount to trigger this abnormality neither. She raised the question if she could take a Tylenol here and there and I think for now it will be okay but she will need to deal with her arthritic symptoms in a topical way or physical therapy way, acupuncture or some other methodology.     I would like for her to come to the office every couple of weeks to do a CBC and a CMP and nurse visit. I will review her back in 6 weeks with a CBC, CMP and CA 15-3. I expect that if the Voltaren is the culprit her liver function test should be normal in 6 weeks.     I will communicate all of these issues to Dar Kirkpatrick MD, who has seen her before. I do not know if he will agree with my assessment but he is welcome to pitch in with any other objection or idea.      I discussed all of these facts with the patient and her brother in the room. I went piece by piece and item by item over all of these issues and significance of each one of them. They understood this clearly.   The patient was further reviewed on 12/29/2021. Recent review of her liver function tests a few days ago documented persistent elevation of her SGOT, SGPT, and alkaline phosphatase. She has discontinued most of the medicines and I have no other choice but discontinue the Femara. Since then and actually today is the 1st day in many weeks that the SGOT and SGPT and alkaline phosphatase are improving. The patient actually remains asymptomatic in this regard. Her liver is not enlarged. She has no jaundice. She has no rash, no skin lesions and no other new alterations. That is good news. Her white blood count, hemoglobin and platelets remain stable. 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 That is extremely rare. As I posted above, I discussed with  all my partners in regard to how many times through their careers prescribing Femara to multiple breast cancers they have seen Femara triggering hepatitis, none of them gave me a positive answer. I reviewed this information in [employer] and it is reported in less than 1% of patients taking this medication. I think we are in front of a rare situation but I want for her to withhold any further Femara treatment for the time being and I want to review her back in 3 weeks. If the liver function tests continue improving or normalize by then maybe we will have the answer once and for all. Raises the question what we will do next and I think the next medicine will be the utilization of Aromasin that has a different chemical component and different methodology for action. I made her aware of that. We are not going to go back in giving her Kisqali under the present circumstances given the confusion and all the issues pertinent to her liver dysfunction.     She already has been seen by Cardiology with no new issues or commentaries by Dr. Odell and her note has been reviewed today. Actually her blood pressure today looks terrific. Her pulse is normal. Her weight is stable. Her metoprolol is still ongoing in a minimal dose.     Therefore, she will return with a CBC, CMP stat in 3 weeks and further review at that point.  She was further reviewed on 01/25/2022. Her clinical examination is negative for metastasis, her liver is not enlarged and she has no jaundice. She discontinued her Femara close to 6 weeks ago and I am expecting to review her liver function test today. Also we requested tumor markers. They have been within normal limits.     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 She is advised to undergo therapy with Aromasin at the dose of 25 mg a day. She will require to be reassessed in 1 month.     We are not going to reestablish Kisqali therapy until we are sure that her liver is going to be  able to handle all of the medications at this point.    In regard to her blood pressure control we are going to send prescription to her pharmacy to have a year of refill on this medication. If the patient needs to go back on aspirin or not remains to be seen until we review her liver function test as well. She remains on her vitamin D supplement and she is taking minimal amount of Tylenol for pain.     I will review her back in a month with a CBC, CMP and a CA 15-3.     I discussed all of these facts with the patient and her brother present in the room.   The patient was further reviewed on 03/07/2022. Since the previous visit the patient has stopped the Femara in 12/2021 and today her liver function tests are completely back to normality. This proves the point that Femara was the culprit phenomenon that I never have seen and discussed with my partner, Danelle Cespedes MD. He never has seen this neither.     In any event the patient's liver function tests are back to normal today. The patient is tolerating her Aromasin extremely well with the exception of hot flashes but otherwise no other new issues. We have her CA 15-3 pending today. This will be discussed with her once that it becomes available. So far we have not documented any radiological or clinical progression of her breast cancer and she will remain again on the Aromasin for the time being.  I am not planning to add any Kisqali to her treatment at this point unless that the tumor marker has a dramatic increase.     In regard to her hypertension the patient will remain on her metoprolol that she has been taking for the time being. In regard to her previous history of stroke she will remain on a baby aspirin.     In regard to pain management for arthritis I asked her to remain on Tylenol to minimize the use of antiinflammatory medication that could have a lot of issues in regard to side effects. She will be allowed to use an antiinflammatory medication like  Aleve, ibuprofen, just very much on prn basis very sporadically.     I will review her back in 2 months with a CBC, CMP and a CA 15-3.    Otherwise I am not planning to have any other intervention or radiological analysis unless that again the tumor marker shows significant modification.     I discussed all of these facts with the patient and her daughter present in the room.           ·   · THIS IS AN ILLNESS THAT POSES A THREAT TO LIFE AND BODY FUNCTION,,  AND REQUIRES INTENSIVE MONITORING OF A LAB TEST, A PHYSIOLOGIC TEST OR IMAGING FOR DIAGNOSIS, THERAPY AND TOXICITY.

## 2022-03-08 ENCOUNTER — TELEPHONE (OUTPATIENT)
Dept: ONCOLOGY | Facility: CLINIC | Age: 62
End: 2022-03-08

## 2022-03-08 ENCOUNTER — SPECIALTY PHARMACY (OUTPATIENT)
Dept: PHARMACY | Facility: HOSPITAL | Age: 62
End: 2022-03-08

## 2022-03-08 NOTE — TELEPHONE ENCOUNTER
----- Message from Pamela Aguila RN sent at 3/8/2022  8:08 AM EST -----    ----- Message -----  From: Elie Dixon MD  Sent: 3/7/2022   6:34 PM EST  To: Ekta Gonzalez RN    CALL HER  IS GREAT 16 CONTINUE AROMASIN

## 2022-03-08 NOTE — PROGRESS NOTES
[facility] Lab Review      3/7/2022   WBC 3.40 - 10.80 10*3/mm3 5.38   Neutrophils Absolute 1.70 - 7.00 10*3/mm3 3.85   Hemoglobin 12.0 - 15.9 g/dL 14.1   Hematocrit 34.0 - 46.6 % 44.0   Platelets 140 - 450 10*3/mm3 171   Creatinine 0.60 - 1.10 mg/dL 1.10   BUN 6 - 20 mg/dL 21 (A)   Sodium 134 - 145 mmol/L 141   Potassium 3.5 - 4.7 mmol/L 4.2   Glucose 74 - 124 mg/dL 94   Calcium 8.5 - 10.2 mg/dL 9.7   Albumin 3.50 - 5.20 g/dL 4.40   Total Protein 6.3 - 8.0 g/dL 6.9   AST (SGOT) 0 - 32 U/L 26   ALT (SGPT) 0 - 33 U/L 27   Alkaline Phosphatase 38 - 116 U/L 107   Total Bilirubin 0.2 - 1.2 mg/dL 0.4   CA 15-3 <=25.0 U/mL 16.4   eGFR >60.0 mL/min/1.73 56.2 (A)  National Kidney Foundation and American Society of Nephrology (ASN) Task Force recommended calculation based on the Chronic Kidney Disease Epidemiology Collaboration (CKD-EPI) equation refit without adjustment for race.     MD dictation noted:    · In any event the patient's liver function tests are back to normal today. The patient is tolerating her Aromasin extremely well with the exception of hot flashes but otherwise no other new issues. We have her CA 15-3 pending today. This will be discussed with her once that it becomes available. So far we have not documented any radiological or clinical progression of her breast cancer and she will remain again on the Aromasin for the time being.  I am not planning to add any Kisqali to her treatment at this point unless that the tumor marker has a dramatic increase.     Thanks,   Gina Lopez, DaylinD

## 2022-05-02 ENCOUNTER — SPECIALTY PHARMACY (OUTPATIENT)
Dept: ONCOLOGY | Facility: CLINIC | Age: 62
End: 2022-05-02

## 2022-05-09 ENCOUNTER — TELEPHONE (OUTPATIENT)
Dept: ONCOLOGY | Facility: CLINIC | Age: 62
End: 2022-05-09

## 2022-05-09 ENCOUNTER — LAB (OUTPATIENT)
Dept: LAB | Facility: HOSPITAL | Age: 62
End: 2022-05-09

## 2022-05-09 ENCOUNTER — OFFICE VISIT (OUTPATIENT)
Dept: ONCOLOGY | Facility: CLINIC | Age: 62
End: 2022-05-09

## 2022-05-09 VITALS
HEIGHT: 66 IN | RESPIRATION RATE: 16 BRPM | BODY MASS INDEX: 26.55 KG/M2 | OXYGEN SATURATION: 99 % | SYSTOLIC BLOOD PRESSURE: 134 MMHG | DIASTOLIC BLOOD PRESSURE: 92 MMHG | TEMPERATURE: 97.7 F | WEIGHT: 165.2 LBS | HEART RATE: 62 BPM

## 2022-05-09 DIAGNOSIS — I10 PRIMARY HYPERTENSION: ICD-10-CM

## 2022-05-09 DIAGNOSIS — Z79.899 ENCOUNTER FOR LONG-TERM (CURRENT) USE OF OTHER MEDICATIONS: ICD-10-CM

## 2022-05-09 DIAGNOSIS — C50.812 MALIGNANT NEOPLASM OF OVERLAPPING SITES OF BOTH BREASTS IN FEMALE, ESTROGEN RECEPTOR POSITIVE: ICD-10-CM

## 2022-05-09 DIAGNOSIS — Z17.0 MALIGNANT NEOPLASM OF OVERLAPPING SITES OF LEFT BREAST IN FEMALE, ESTROGEN RECEPTOR POSITIVE: ICD-10-CM

## 2022-05-09 DIAGNOSIS — Z17.0 MALIGNANT NEOPLASM OF OVERLAPPING SITES OF BOTH BREASTS IN FEMALE, ESTROGEN RECEPTOR POSITIVE: ICD-10-CM

## 2022-05-09 DIAGNOSIS — C50.812 MALIGNANT NEOPLASM OF OVERLAPPING SITES OF LEFT BREAST IN FEMALE, ESTROGEN RECEPTOR POSITIVE: Primary | ICD-10-CM

## 2022-05-09 DIAGNOSIS — C50.812 MALIGNANT NEOPLASM OF OVERLAPPING SITES OF LEFT BREAST IN FEMALE, ESTROGEN RECEPTOR POSITIVE: ICD-10-CM

## 2022-05-09 DIAGNOSIS — T50.905A DRUG-INDUCED HEPATITIS: ICD-10-CM

## 2022-05-09 DIAGNOSIS — Z17.0 MALIGNANT NEOPLASM OF OVERLAPPING SITES OF LEFT BREAST IN FEMALE, ESTROGEN RECEPTOR POSITIVE: Primary | ICD-10-CM

## 2022-05-09 DIAGNOSIS — C50.811 MALIGNANT NEOPLASM OF OVERLAPPING SITES OF BOTH BREASTS IN FEMALE, ESTROGEN RECEPTOR POSITIVE: ICD-10-CM

## 2022-05-09 DIAGNOSIS — Z79.899 HIGH RISK MEDICATION USE: ICD-10-CM

## 2022-05-09 DIAGNOSIS — K71.6 DRUG-INDUCED HEPATITIS: ICD-10-CM

## 2022-05-09 LAB
ALBUMIN SERPL-MCNC: 4.6 G/DL (ref 3.5–5.2)
ALBUMIN/GLOB SERPL: 2.1 G/DL (ref 1.1–2.4)
ALP SERPL-CCNC: 92 U/L (ref 38–116)
ALT SERPL W P-5'-P-CCNC: 25 U/L (ref 0–33)
ANION GAP SERPL CALCULATED.3IONS-SCNC: 10.2 MMOL/L (ref 5–15)
AST SERPL-CCNC: 26 U/L (ref 0–32)
BASOPHILS # BLD AUTO: 0.03 10*3/MM3 (ref 0–0.2)
BASOPHILS NFR BLD AUTO: 0.7 % (ref 0–1.5)
BILIRUB SERPL-MCNC: 0.6 MG/DL (ref 0.2–1.2)
BUN SERPL-MCNC: 21 MG/DL (ref 6–20)
BUN/CREAT SERPL: 21.2 (ref 7.3–30)
CALCIUM SPEC-SCNC: 9.9 MG/DL (ref 8.5–10.2)
CANCER AG15-3 SERPL-ACNC: 14.6 U/ML
CHLORIDE SERPL-SCNC: 106 MMOL/L (ref 98–107)
CO2 SERPL-SCNC: 25.8 MMOL/L (ref 22–29)
CREAT SERPL-MCNC: 0.99 MG/DL (ref 0.6–1.1)
DEPRECATED RDW RBC AUTO: 54.4 FL (ref 37–54)
EGFRCR SERPLBLD CKD-EPI 2021: 63.8 ML/MIN/1.73
EOSINOPHIL # BLD AUTO: 0.05 10*3/MM3 (ref 0–0.4)
EOSINOPHIL NFR BLD AUTO: 1.2 % (ref 0.3–6.2)
ERYTHROCYTE [DISTWIDTH] IN BLOOD BY AUTOMATED COUNT: 15.1 % (ref 12.3–15.4)
GLOBULIN UR ELPH-MCNC: 2.2 GM/DL (ref 1.8–3.5)
GLUCOSE SERPL-MCNC: 92 MG/DL (ref 74–124)
HCT VFR BLD AUTO: 41.8 % (ref 34–46.6)
HGB BLD-MCNC: 13.4 G/DL (ref 12–15.9)
IMM GRANULOCYTES # BLD AUTO: 0.01 10*3/MM3 (ref 0–0.05)
IMM GRANULOCYTES NFR BLD AUTO: 0.2 % (ref 0–0.5)
LYMPHOCYTES # BLD AUTO: 0.95 10*3/MM3 (ref 0.7–3.1)
LYMPHOCYTES NFR BLD AUTO: 22.2 % (ref 19.6–45.3)
MCH RBC QN AUTO: 31.3 PG (ref 26.6–33)
MCHC RBC AUTO-ENTMCNC: 32.1 G/DL (ref 31.5–35.7)
MCV RBC AUTO: 97.7 FL (ref 79–97)
MONOCYTES # BLD AUTO: 0.34 10*3/MM3 (ref 0.1–0.9)
MONOCYTES NFR BLD AUTO: 8 % (ref 5–12)
NEUTROPHILS NFR BLD AUTO: 2.89 10*3/MM3 (ref 1.7–7)
NEUTROPHILS NFR BLD AUTO: 67.7 % (ref 42.7–76)
NRBC BLD AUTO-RTO: 0 /100 WBC (ref 0–0.2)
PLATELET # BLD AUTO: 173 10*3/MM3 (ref 140–450)
PMV BLD AUTO: 8.3 FL (ref 6–12)
POTASSIUM SERPL-SCNC: 4.4 MMOL/L (ref 3.5–4.7)
PROT SERPL-MCNC: 6.8 G/DL (ref 6.3–8)
RBC # BLD AUTO: 4.28 10*6/MM3 (ref 3.77–5.28)
SODIUM SERPL-SCNC: 142 MMOL/L (ref 134–145)
WBC NRBC COR # BLD: 4.27 10*3/MM3 (ref 3.4–10.8)

## 2022-05-09 PROCEDURE — 36415 COLL VENOUS BLD VENIPUNCTURE: CPT

## 2022-05-09 PROCEDURE — 80053 COMPREHEN METABOLIC PANEL: CPT

## 2022-05-09 PROCEDURE — 86300 IMMUNOASSAY TUMOR CA 15-3: CPT | Performed by: INTERNAL MEDICINE

## 2022-05-09 PROCEDURE — 99214 OFFICE O/P EST MOD 30 MIN: CPT | Performed by: INTERNAL MEDICINE

## 2022-05-09 PROCEDURE — 85025 COMPLETE CBC W/AUTO DIFF WBC: CPT

## 2022-05-09 RX ORDER — GABAPENTIN 100 MG/1
100 CAPSULE ORAL NIGHTLY
Qty: 30 CAPSULE | Refills: 0 | Status: SHIPPED | OUTPATIENT
Start: 2022-05-09 | End: 2022-06-06

## 2022-05-09 RX ORDER — DICLOFENAC SODIUM 75 MG/1
75 TABLET, DELAYED RELEASE ORAL DAILY
Status: ON HOLD | COMMUNITY
Start: 2022-04-05 | End: 2022-07-05

## 2022-05-09 NOTE — TELEPHONE ENCOUNTER
----- Message from Elie Dixon MD sent at 5/9/2022 12:58 PM EDT -----  Call her liver test are fine, go ahead and send me a prescription for nurontin 100 mg po qhs # 30 for my signature this is for insomnia

## 2022-05-09 NOTE — TELEPHONE ENCOUNTER
----- Message from Elie Dixon MD sent at 5/9/2022  3:12 PM EDT -----  CALL HER CANCER MARKER IS [facility]

## 2022-05-09 NOTE — PROGRESS NOTES
Subjective     REASON FOR FOLLOW UP:  1. 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 R BREAST MASS WITH GIANT R AXILLARY ADENOPATHY.  SHE UNDERWENT DOSE DENSE AC, TAXOL, BILATERAL MASTECTOMIES, DRAMATIC NEAR COMPLETE [place], RADIATION THERAPY AND ADJUVANT FEMARA STOPPED ON 12/21 BECAUSE DRUG INDUCED HEPATITIS, SWITCHED TO AROMASIN 2/22: NO SIDE EFFECTS.    3. FAMILY HISTORY OF BREAST CANCER IN MOTHER AND SISTER, SISTER WAS TESTED FOR BRCA AND WAS NEGATIVE.    4. LEFT OVARIAN CYST , IT HAS BEEN PRESENT FOR LONG TIME BUT IS GETTING LARGER, ASYMPTOMATIC, NEED FOR , PELVIC US AND GYN ONC EVEAL                   5. LEFT HIP PAIN SEVERE ARTHRITIS, REAL NEED FOR LEFT HIP REPLACEMENT: PERFORMED 8/21    6. 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        DURING THE VISIT WITH THE PATIENT TODAY , PATIENT HAD FACE MASK, MY MEDICAL ASSISTANT AND I  HAD PROPPER PROTECTIVE EQUIPMENT, AND I DID HAND HYGIENE WITH SOAP AND WATER BEFORE AND AFTER THE VISIT.    This patient returns today to the office for follow up in company of her brother. 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 Occasionally pain in the left hip surgical site. No limping. No other site of bone pain. Chest wall remains unremarkable. She has not had any cough, sputum production, shortness of breath or cough, no infections. As usual some element of upper respiratory allergy. Normal bowel activity, normal urination. No neurological symptomatology. She tolerates her Aromasin perfectly well with no bone or joint pain more than usual and with 100% compliance.                 Past Medical History:   Diagnosis Date   • Anemia in neoplastic disease    • Arthritis    • Breast cancer (HCC)     Left   • CVA (cerebral vascular accident) (HCC)   "  • Hip pain     RIGHT HIP... CYST   • History of fracture of leg 1987   • History of radiation therapy     LAST TREATMENT     • Hypertension    • Limited joint range of motion (ROM)     RIGHT HIP   • Skin sore     OPEN SORE LEFT BREAST   • Syncope    • Vertigo            Past Surgical History:   Procedure Laterality Date   • AXILLARY LYMPH NODE BIOPSY/EXCISION Right     LYMPH NODE UNDER RIGHT ARM-MALIGNANT (DOUBLE MASTECTOMY)   • BREAST BIOPSY Left     MALIGNANT   • FRACTURE SURGERY  1987    Leg   • HARDWARE REMOVAL     • MASTECTOMY W/ SENTINEL NODE BIOPSY Bilateral 9/16/2019    Procedure: BILATERLA MODIFIED RADICAL MASTECTOMY WITH BILATERAL SENTINEL LYMPH NODE BIOPSY;  Surgeon: oJby Barron Jr., MD;  Location: [facility] OR;  Service: General   • TOTAL HIP ARTHROPLASTY Right 3/2/2020    Procedure: RIGHT TOTAL HIP ARTHROPLASTY NATALY NAVIGATION;  Surgeon: Luis M Leonard MD;  Location: [facility] OR;  Service: Orthopedics;  Laterality: Right;   • TOTAL HIP ARTHROPLASTY Left 7/20/2021    Procedure: Posterior LEFT TOTAL HIP ARTHROPLASTY NATALY NAVIGATION;  Surgeon: Luis M Leonard MD;  Location: [facility] OR;  Service: Orthopedics;  Laterality: Left;   • VENOUS ACCESS DEVICE (PORT) INSERTION Right 2/1/2019    Procedure: INSERTION VENOUS ACCESS DEVICE;  Surgeon: Joby Barron Jr., MD;  Location: [facility]SC;  Service: General   • VENOUS ACCESS DEVICE (PORT) REMOVAL N/A 10/30/2019    Procedure: Mediport Removal;  Surgeon: Joby Barron Jr., MD;  Location: [facility] OR;  Service: General        Current Outpatient Medications on File Prior to Visit   Medication Sig Dispense Refill   • acetaminophen (TYLENOL) 650 MG 8 hr tablet Take 1,950 mg by mouth Every 8 (Eight) Hours As Needed.     • Cholecalciferol 250 MCG (21466 UT) tablet Take 1 tablet by mouth. Patient stated dose as \"1500 mg\"     • diclofenac (VOLTAREN) 75 MG EC tablet Take 75 mg by mouth 2 (Two) Times a Day.     • exemestane (Aromasin) " "25 MG chemo tablet Take 1 tablet by mouth Daily. Begin in two weeks 30 tablet 3   • magnesium oxide (MAG-OX) 400 MG tablet Take 400 mg by mouth 2 (two) times a day. Patient stated dose as \"1500mg\"     • meclizine (ANTIVERT) 25 MG tablet Take 1 tablet by mouth 3 (Three) Times a Day As Needed for Dizziness. 90 tablet 0   • metoprolol succinate XL (TOPROL-XL) 25 MG 24 hr tablet Take 1 tablet by mouth Daily. 90 tablet 3     Current Facility-Administered Medications on File Prior to Visit   Medication Dose Route Frequency Provider Last Rate Last Admin   • Chlorhexidine Gluconate Cloth 2 % pads 1 each  1 each Apply externally Take As Directed Luis M Leonard MD            ALLERGIES:    Allergies   Allergen Reactions   • Benadryl [Diphenhydramine] Itching     INCREASES BLOOD PRESSURE   • Erythromycin GI Intolerance   • Levaquin [Levofloxacin] GI Intolerance   • Penicillins GI Intolerance        Social History     Socioeconomic History   • Marital status:      Spouse name: Cruz   • Number of children: 0   Tobacco Use   • Smoking status: Never Smoker   • Smokeless tobacco: Never Used   Vaping Use   • Vaping Use: Never used   Substance and Sexual Activity   • Alcohol use: Not Currently     Alcohol/week: 0.0 standard drinks     Comment: 2 CUPS DAILY   • Drug use: No   • Sexual activity: Not Currently     Partners: Male     Birth control/protection: Abstinence        Family History   Problem Relation Age of Onset   • Lung cancer Father    • Irritable bowel syndrome Father    • Cancer Mother    • Breast cancer Mother    • Liver disease Mother    • Breast cancer Sister 48   • Breast cancer Maternal Grandmother    • Breast cancer Paternal Grandmother    • Breast cancer Maternal Uncle    • Malig Hyperthermia Neg Hx             Objective     Vitals:    05/09/22 1115   BP: 134/92   Pulse: 62   Resp: 16   Temp: 97.7 °F (36.5 °C)   TempSrc: Temporal   SpO2: 99%   Weight: 74.9 kg (165 lb 3.2 oz)   Height: 167 cm (65.75\") "   PainSc: 0-No pain     Current Status 3/7/2022   ECOG score 0     Exam:   I HAVE PERSONALLY REVIEWED THE HISTORY OF THE PRESENT ILLNESS, PAST MEDICAL HISTORY, FAMILY HISTORY, SOCIAL HISTORY, ALLERGIES, MEDICATIONS STATED ABOVE IN THE  NOTE FROM TODAY.        GENERAL:  Well-developed, well-nourished  Patient  in no acute distress.   SKIN:  Warm, dry ,NO purpura ,NO petechiae, no rash.  HEENT:  Pupils were equal and reactive to light and accomodation, conjunctivae noninjected, no pterygium, normal extraocular movements, normal visual acuity.   NECK:  Supple with good range of motion; no thyromegaly , no other masses, no JVD or bruits,.No carotid artery pain, no carotid abnormal pulsation , NO arterial dance.  LYMPHATICS:  No cervical, NO supraclavicular, NO axillary,NO epitrochlear , NO inguinal adenopathies.  CARDIAC   normal rate , regular rhythm, without murmur,NO rubs NO S3 NO S4 Chest wall shows bilateral mastectomy sites with no alterations, nodularities, masses, erythema. Axillas with no adenopathies. Minimal telangectasia's in other radiation sites      LUNGS: normal breath sounds bilateral, no wheezing, NO rhonchi, NO crackles ,NO rubs.  VASCULAR VENOUS: no cyanosis, NO collateral circulation, NO varicosities, NO edema, NO palpable cords, NO pain,NO erythema, NO pigmentation of the skin.  ABDOMEN:  Soft, NO pain,no hepatomegaly, no splenomegaly,no masses, no ascites, no collateral circulation,no distention,no Savanna sign.  EXTREMITIES  AND SPINE:  No clubbing, no cyanosis ,OA HANDS deformities , no pain .No kyphosis,  no pain in spine, no pain in ribs , no pain in pelvic bone.  NEUROLOGICAL:  Patient was awake, alert, oriented to time, person and place.        .            RECENT LABS:  Hematology WBC   Date Value Ref Range Status   05/09/2022 4.27 3.40 - 10.80 10*3/mm3 Final     RBC   Date Value Ref Range Status   05/09/2022 4.28 3.77 - 5.28 10*6/mm3 Final     Hemoglobin   Date Value Ref Range Status    05/09/2022 13.4 12.0 - 15.9 g/dL Final     Hematocrit   Date Value Ref Range Status   05/09/2022 41.8 34.0 - 46.6 % Final     Platelets   Date Value Ref Range Status   05/09/2022 173 140 - 450 10*3/mm3 Final        Component   Ref Range & Units 08:53   (3/7/22) 1 mo ago   (1/25/22) 2 mo ago   (12/29/21) 2 mo ago   (12/17/21) 3 mo ago   (12/3/21) 3 mo ago   (11/19/21) 3 mo ago   (11/10/21)   Glucose   74 - 124 mg/dL 94  91  120  89  103  131 High   78    BUN   6 - 20 mg/dL 21 High   16  17  17  14  22 High   23 High     Creatinine   0.60 - 1.10 mg/dL 1.10  0.90  0.93  0.90  0.88  0.85  0.90    Sodium   134 - 145 mmol/L 141  141  141  140  140  139  139    Potassium   3.5 - 4.7 mmol/L 4.2  4.2  3.9  4.2  4.4  4.3  3.8    Chloride   98 - 107 mmol/L 104  105  103  104  105  102  105    CO2   22.0 - 29.0 mmol/L 26.9  26.2  28.1  24.8  26.2  26.1  24.4    Calcium   8.5 - 10.2 mg/dL 9.7  9.5  9.9  9.9  9.6  9.9  9.4    Total Protein   6.3 - 8.0 g/dL 6.9  6.9  7.0  7.3  6.7  7.3  6.5    Albumin   3.50 - 5.20 g/dL 4.40  4.30  4.50  4.70  4.40  4.60  4.30    ALT (SGPT)   0 - 33 U/L 27  90 High   326 High Critical   456 High Critical   403 High Critical   442 High Critical   464 High Critical     AST (SGOT)   0 - 32 U/L 26  55 High   167 High Critical   239 High Critical   208 High Critical   186 High Critical   183 High Critical     Alkaline Phosphatase   38 - 116 U/L 107  126 High   133 High   140 High   150 High   157 High   158 High     Total Bilirubin   0.2 - 1.2 mg/dL 0.4  0.3  0.6  0.6  0.3  0.5  0.3    Globulin   1.8 - 3.5 gm/dL 2.5  2.6  2.5  2.6  2.3  2.7  2.2    A/G Ratio   1.1 - 2.4 g/dL 1.8  1.7  1.8  1.8  1.9  1.7  2.0    BUN/Creatinine Ratio   7.3 - 30.0 19.1  17.8  18.3  18.9  15.9  25.9  25.6    Anion Gap   5.0 - 15.0 mmol/L 10.1  9.8  9.9  11.2  8.             Assessment/Plan    1.  History of least for the last 4 years, she has had an in crescendo mass in the left breast that has produced a gigantic tumor  that WAS close to 25 cm in size and is replacing most of the anatomy of the left breast. There are areas of ulceration necrosis and bleeding and the mass is fixed to the chest wall. She has abundant amount of collateral circulation in the left anterior chest wall and it caught my attention the lack of any left axillary adenopathy. She has no lymphedema in the left upper extremity. In the right breast while in sitting position, no palpable abnormalities are documented. The right breast skin and nipple are normal. When the patient lies flat, there is a palpable mass at 9 o'clock from the nipple that measures probably 4 cm in size, very indistinct in regard to edges and margins. There was no mobility and no areas of tenderness. She has a giant adenopathy in the right axilla that measures close to 9 cm in size, uniform, no fluctuation, nontender, completely fixed to the axillary wall. There is no compromise of the skin.     After completion of 4 cycles of AC patient has had very dramatic improvement in all sites of disease including right axilla and left breast.    · Completed 4 cycles Adriamycin Cytoxan on 4/12/2019.    · Patient started weekly Taxol treatments on 5/3/2019.  · Completed 12 weekly Taxol treatments on 7/19/2019.  · 9/16/2019 bilateral mastectomy by Dr. Joby Barron with axillary lymph node dissection.  No residual malignancy.  · 10/30/2019 patient's Mediport was removed in anticipation of radiation.  · Radiation therapy under the care of Dr. Marmolejo 10/31/2019-1/13/2020 to the right chest wall.  · Started on adjuvant Femara 2.5 mg daily 8/20/2019.  · 9/21/2020 continues on adjuvant Femara, tolerating it quite well.  Some mild hot flashes and mild arthralgias, all which are tolerable.  I advised the patient that at this point her breast cancer remains in remission. She is 100% compliant of her medication letrozole and her clinical examination remains negative normal for breast cancer recurrence.   The  patient was further reviewed on 04/16/2021. Her clinical examination is completely negative normal and her questioning is negative in regard to symptoms that would suggest breast cancer recurrence. She is 100% compliant with her Femara. Her white count, hemoglobin and platelets remain normal. Her tumor marker during the previous visit was normal and we have another one pending today CA 15-3.   The patient was further reviewed on 05/17/2021 along with her brother. The clinical examination has not changed. The only new complaint is the progressive amount of discomfort and the inability to function given the pain in the left hip area. Now she is limping. The only time when she is not in discomfort with the left hip is when she is sitting or lying in bed or riding the horse when gravity takes away the pressure of her left hip area. Radiologically speaking the CT scan of the chest, abdomen and pelvis to my eyes disclosed no abnormalities besides a very simple ovarian cyst that measures close to 7 x 5 cm with no trabeculation. There is no pelvic ascites. There is no pelvic adenopathy. The other abnormality obviously visible is the severe degree of scoliosis of the thoracic, lumbar spines.     It caught my attention the subchondral cyst in the left hip and the minimal if any space leftover between the head of the femur and the acetabulum. There is no metastasis in this anatomical site.     The radiologist mentioned cardiophrenic angle lymphadenopathy. I cannot see that from my naked eyes. I do not know what it is and I do not know how to express it. Pulmonary anatomy is normal. Hilar adenopathy is normal. No pericardial effusion. No pleural effusion. Minimal infiltrate in the lungs related to radiation changes. Liver anatomy, pancreas and kidneys were unremarkable. The scoliosis is very obvious in the view of the abdomen.     Her CA 15-3 was minimal elevated in comparison to previous assessment and it raises the following  question.   1. Is the cardiophrenic node described by the radiologist indeed significant of breast cancer recurrence or not? The only way to know will be to do a PET scan. This will be scheduled.   2. She is known for having an ovarian cyst for a long time but now is getting bigger, 7 cm across, and has no TABICATION with no pelvic ascites. I do not believe that this is representation of malignancy; nevertheless, I am going to run a CA-125 on her and I will proceed with a pelvic ultrasound as well as a GYN oncology referral for review.   3. I will send a notification to Dr. Leonard, the patient’s orthopedic surgeon, in regard to all her issues pertinent to the left hip. I think the patient is getting closer and closer to having a left hip replacement. 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 She recovered extremely well from her right hip surgery before.     The patient returned to the office on 05/24/2021. Since the previous visit the patient proceeded with a PET scan and I have reviewed this with her in the PAC system showing chest wall on the left side area of enlightening SUV activity that is almost 3 cm long x 7 mm wide. It is behind the bone. It is very close to the lower aspect of her heart. The reset of the PET scan discloses no activity. This is also specific in regard to the ovaries and actually an ultrasound of her vagina looking into the ovaries documented an unilocular cyst on the left side with typical features of benignity and a  was completely normal 5.5.     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 The rest of the PET scan is very much negative normal. I would not be surprised if this is an area of  the internal mammary node chain.     Obviously the ovarian cyst is benign only locular with a normal  and I find no reason to refer her to GYN oncology anymore.     Therefore my advice to her is as follows:  1. She will remain on her letrozole 2.5 mg a day.  2. She will initiate Kisqali at the usual dose 3 weeks on 1 week off making her aware of the side effects of the medicine including leukopenia, nausea, vomiting, rash, diarrhea, abnormalities in the liver function test and neutropenic fever. She will take the medicine at least for the next 6 months.  3. The patient will proceed with referral to radiation oncology to treat this area completely.  4. I am going to go ahead and make a referral to Cardiology in preparation for this site of radiation therapy and knowing that tangential fields will be difficult to produce, I think it will be important to have her to be seen by Cardiology in preparation of Kisqali use. Also I advised her that I would like for her to take a magnesium supplement and she needs to eat plenty of nuts to minimize hypomagnesemia that can help her to prolong QT interval that is the most important side effect of Kisqali to my eyes. I advised her to continue her blood pressure medication and I advised her also to have an EKG today and also have a repeated EKG upon return in 3 weeks.    5. The patient will be having formal education and consent for Kisqali and she knows that this medicine will come to her from a speciality pharmacy.   6. The patient will require to have a repeat PET scan at least 6-8 weeks after completion of her radiation therapy to the chest wall.   7. I advised her and her brother present on the telephone of all of these events and she was ready to proceed.     The patient was further reviewed on 07/07/2021 after she has continued her Kisqali and completion of the 1st round of the medicine. Today her EKG disclosed a normal QT interval and this has been sent to Cardiology  to be reported officially. 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 She has completed her radiation therapy to the epicardial right lymph node with no difficulties or side effects.     The thing that is bothering her the most is the pain in the left hip associated with advanced degenerative arthritis and she is limping substantially and having discomfort requiring to take pain medicine, Voltaren, on a regular schedule. She already has been scheduled to have her hip replaced in a few days. In preparation for this I have advised the patient the following.   1. She will discontinue her aspirin and her nonsteroidal anti-inflammatory medications a week in advance for her surgery. This will minimize the potential for bleeding.   2. The patient will hold off on the Kisqali until I review her back in the office in 6 weeks. She will require a blood count done 3 days before the surgery at the same time that she will require her official COVID test before the surgical intervention.   3. The patient will remain on Femara for the time being at 2.5 mg a day. She has no need to stop this medication anytime besides the day of the surgery. She will resume this after the surgery and as soon as she is able to receive oral route.   4. Eventually the patient will require radiological assessment upon review in order to see what happened to the epicardial lymph node.           The patient was further reviewed on 09/30/2021. She has recovered further from the left hip surgery but she is not doing physical therapy anymore. She has a minimal limp and I advised her to go back on physical therapy on her own at home. She knows how to do the exercises and she has the afternoon off every single day.    The patient completed her Kisqali a week ago. Her white count is low today. The hemoglobin is stable. The platelet count is stable. She has had issues with the consecution of the Kisqali but the pharmacists have  been working on this process with  her specialty pharmacy.     Today it caught my attention the significant bump in her liver function test, SGOT, SGPT. The patient is not drinking any alcohol. She is not taking any cholesterol medicine. She is not taking any anti-inflammatory medication and leads me to think that Kisqali is the culprit of this. Given these findings we advised the patient to put the Kisqali on hold until we recheck chemistry profile on weekly basis for the next 3 or 4 weeks. We need to settle these numbers down before she continues the Kisqali. She probably will require dose adjustment at some point. Again, her hepatitis A, B and C serologies are negative.     Instructed pharmacist to discuss these issues with her as well.     She will be called at home with the report of her CA 15-3.     I encouraged her to remain on her Femara though.     We provided her blood pressure medication. She will remain on this for the time being.     1. In regard to her history of breast cancer she was reviewed on 11/19/2021. Since the previous visit the patient has had tumor marker CA 15-3 that has dropped to 20. Radiological assessment of her chest and abdomen CT scan that documents resolution of the epicardial lymphadenopathy in the right hemithorax, no pulmonary nodules or metastasis, no pleural effusion and no liver metastasis. No bone metastasis. Based on this information I do believe that the breast cancer is relatively quiet clinically, biochemically and radiologically and I advised her today to resume her Femara at the dose of 2.5 mg a day. The likelihood of Femara producing liver dysfunction is more than remote.   2. This patient has developed very significant abnormalities in her liver function test with persistent elevation of the SGOT, SGPT and alkaline phosphatase and normal bilirubin. 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 I have tested a multitude of liver issues including hepatitis A, B  and C serologies that were negative, antitrypsin 1 that was normal, antimicrosomal antibodies that was normal, and AARON. Anti-celiac sprue panel also was done, ferritin level, copper also were done looking for Stepehn's disease and hemochromatosis. All of these conditions are basically ruled out. I even went ahead and scheduled her to have a liver biopsy that documents according to the pathologist inflammatory process in the liver consistent with drug effect.    We have discontinued the Kisqali 2 months ago thinking that that was the culprit but the numbers have not improved, then we discontinued most of the other supplemental medicines that she was taking, this led the numbers to improve and the only thing that she is left taking is Voltaren. She takes the Voltaren for her arthritis. I advised her on 11/19/2021 that she must discontinue the Voltaren and hopefully this will be making her numbers in regard to liver dysfunction to improve.     We are sure that this patient is not drinking alcohol at all.    We reviewed her medication list today and the only thing that she is taking is metoprolol and vitamin D. We asked her to remain on these medicines.     I even went ahead a couple of weeks ago to put her on a low dose prednisone to see if this had any benefit in her liver function test and it has not. I asked her today to discontinue this medicine as well.     I insisted today that the most likely reason why her liver function test is abnormal after all of the reviewing that we have done and also reviewing an alpha fetoprotein that was negative normal is drug effect. The only medicine that is leftover is antiinflammatory medication. Metoprolol doing this is kind of unexpected and she does not take Tylenol in enough amount to trigger this abnormality neither. She raised the question if she could take a Tylenol here and there and I think for now it will be okay but she will need to deal with her arthritic symptoms in a  topical way or physical therapy way, acupuncture or some other methodology.     I would like for her to come to the office every couple of weeks to do a CBC and a CMP and nurse visit. I will review her back in 6 weeks with a CBC, CMP and CA 15-3. I expect that if the Voltaren is the culprit her liver function test should be normal in 6 weeks.     I will communicate all of these issues to Dar Kirkpatrick MD, who has seen her before. I do not know if he will agree with my assessment but he is welcome to pitch in with any other objection or idea.      I discussed all of these facts with the patient and her brother in the room. I went piece by piece and item by item over all of these issues and significance of each one of them. They understood this clearly.   The patient was further reviewed on 12/29/2021. Recent review of her liver function tests a few days ago documented persistent elevation of her SGOT, SGPT, and alkaline phosphatase. She has discontinued most of the medicines and I have no other choice but discontinue the Femara. Since then and actually today is the 1st day in many weeks that the SGOT and SGPT and alkaline phosphatase are improving. The patient actually remains asymptomatic in this regard. Her liver is not enlarged. She has no jaundice. She has no rash, no skin lesions and no other new alterations. That is good news. Her white blood count, hemoglobin and platelets remain stable. 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 That is extremely rare. As I posted above, I discussed with all my partners in regard to how many times through their careers prescribing Femara to multiple breast cancers they have seen Femara triggering hepatitis, none of them gave me a positive answer. I reviewed this information in [employer] and it is reported in less than 1% of  patients taking this medication. I think we are in front of a rare situation but I want for her to withhold any further Femara treatment for the time being and I want to review her back in 3 weeks. If the liver function tests continue improving or normalize by then maybe we will have the answer once and for all. Raises the question what we will do next and I think the next medicine will be the utilization of Aromasin that has a different chemical component and different methodology for action. I made her aware of that. We are not going to go back in giving her Kisqali under the present circumstances given the confusion and all the issues pertinent to her liver dysfunction.     She already has been seen by Cardiology with no new issues or commentaries by Dr. Odell and her note has been reviewed today. Actually her blood pressure today looks terrific. Her pulse is normal. Her weight is stable. Her metoprolol is still ongoing in a minimal dose.     Therefore, she will return with a CBC, CMP stat in 3 weeks and further review at that point.  She was further reviewed on 01/25/2022. Her clinical examination is negative for metastasis, her liver is not enlarged and she has no jaundice. She discontinued her Femara close to 6 weeks ago and I am expecting to review her liver function test today. Also we requested tumor markers. They have been within normal limits.     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 She is advised to undergo therapy with Aromasin at the dose of 25 mg a day. She will require to be reassessed in 1 month.     We are not going to reestablish Kisqali therapy until we are sure that her liver is going to be able to handle all of the medications at this point.    In regard to her blood pressure control we are going to send prescription to her pharmacy to have a year of refill on this medication. If the patient needs to go back on aspirin or not remains to be seen until we review  her liver function test as well. She remains on her vitamin D supplement and she is taking minimal amount of Tylenol for pain.     I will review her back in a month with a CBC, CMP and a CA 15-3.     I discussed all of these facts with the patient and her brother present in the room.   The patient was further reviewed on 03/07/2022. Since the previous visit the patient has stopped the Femara in 12/2021 and today her liver function tests are completely back to normality. This proves the point that Femara was the culprit phenomenon that I never have seen and discussed with my partner, Danelle Cespedes MD. He never has seen this neither.     In any event the patient's liver function tests are back to normal today. The patient is tolerating her Aromasin extremely well with the exception of hot flashes but otherwise no other new issues. We have her CA 15-3 pending today. This will be discussed with her once that it becomes available. So far we have not documented any radiological or clinical progression of her breast cancer and she will remain again on the Aromasin for the time being.  I am not planning to add any Kisqali to her treatment at this point unless that the tumor marker has a dramatic increase.     In regard to her hypertension the patient will remain on her metoprolol that she has been taking for the time being. In regard to her previous history of stroke she will remain on a baby aspirin.     In regard to pain management for arthritis I asked her to remain on Tylenol to minimize the use of antiinflammatory medication that could have a lot of issues in regard to side effects. She will be allowed to use an antiinflammatory medication like Aleve, ibuprofen, just very much on prn basis very sporadically.     I will review her back in 2 months with a CBC, CMP and a CA 15-3.    Otherwise I am not planning to have any other intervention or radiological analysis unless that again the tumor marker shows significant  modification.     I discussed all of these facts with the patient and her daughter present in the room.      The patient returned on 05/09/2022 with no symptoms of anything in particular besides minimal respiratory allergies and pain in the left hip associated with her previous surgery. Her clinical examination today is very much unremarkable, she looks fantastic. She has no symptoms or signs of breast cancer recurrence. White count, hemoglobin and platelets are normal. Her tumor marker CA 15-3 has remained normal and actually lower than ever and compliance with Aromasin has been 100% with excellent tolerance.     Given the circumstances of this I advised her to remain on Aromasin 25 mg a day and I find no use for this patient to go back onto Kisqali or medicines of this nature.     From the point of view of her abnormal liver function abnormalities associated with Femara utilization her chemistry profile today is completely normal including SGOT, SGPT, bilirubin, alkaline phosphatase. On clinical grounds her liver is not enlarged. It is impressive to see how much hepatitis she got out of Femara and how quickly she got better after stopping the Femara. This is extremely unusual but it happened.    I notified the patient of this good finding and obviously she will never take Femara again.    I will review her back in 6-8 weeks to continue monitoring the clinical situation along with a CBC, CMP and CA 15-3.     Finally I encouraged her to remain on her aspirin and be sure that she takes her blood pressure medication. I provide these medicines for her.    Also Toprol will be continued and gabapentin for hot flashes or insomnia could be utilized and could be utilized as well for pain.    I discussed all of these facts with the patient and her brother in the room.

## 2022-05-26 DIAGNOSIS — C50.812 MALIGNANT NEOPLASM OF OVERLAPPING SITES OF LEFT BREAST IN FEMALE, ESTROGEN RECEPTOR POSITIVE: Primary | ICD-10-CM

## 2022-05-26 DIAGNOSIS — Z17.0 MALIGNANT NEOPLASM OF OVERLAPPING SITES OF LEFT BREAST IN FEMALE, ESTROGEN RECEPTOR POSITIVE: Primary | ICD-10-CM

## 2022-05-26 RX ORDER — EXEMESTANE 25 MG/1
25 TABLET ORAL DAILY
Qty: 30 TABLET | Refills: 3 | Status: SHIPPED | OUTPATIENT
Start: 2022-05-26 | End: 2022-10-03

## 2022-05-26 RX ORDER — EXEMESTANE 25 MG/1
TABLET ORAL
Qty: 30 TABLET | Refills: 3 | OUTPATIENT
Start: 2022-05-26

## 2022-06-02 ENCOUNTER — APPOINTMENT (OUTPATIENT)
Dept: GENERAL RADIOLOGY | Facility: HOSPITAL | Age: 62
End: 2022-06-02

## 2022-06-02 ENCOUNTER — HOSPITAL ENCOUNTER (EMERGENCY)
Facility: HOSPITAL | Age: 62
Discharge: HOME | End: 2022-06-02
Attending: EMERGENCY MEDICINE | Admitting: EMERGENCY MEDICINE

## 2022-06-02 VITALS
OXYGEN SATURATION: 98 % | DIASTOLIC BLOOD PRESSURE: 123 MMHG | TEMPERATURE: 98.6 F | RESPIRATION RATE: 14 BRPM | SYSTOLIC BLOOD PRESSURE: 140 MMHG | HEART RATE: 93 BPM

## 2022-06-02 DIAGNOSIS — S73.034A ANTERIOR DISLOCATION OF RIGHT HIP, INITIAL ENCOUNTER: Primary | ICD-10-CM

## 2022-06-02 LAB
HOLD SPECIMEN: NORMAL
WHOLE BLOOD HOLD COAG: NORMAL
WHOLE BLOOD HOLD SPECIMEN: NORMAL

## 2022-06-02 PROCEDURE — 25010000002 PROPOFOL 10 MG/ML EMULSION: Performed by: EMERGENCY MEDICINE

## 2022-06-02 PROCEDURE — 99283 EMERGENCY DEPT VISIT LOW MDM: CPT

## 2022-06-02 PROCEDURE — 73501 X-RAY EXAM HIP UNI 1 VIEW: CPT

## 2022-06-02 PROCEDURE — 99156 MOD SED OTH PHYS/QHP 5/>YRS: CPT

## 2022-06-02 RX ORDER — PROPOFOL 10 MG/ML
1 VIAL (ML) INTRAVENOUS ONCE
Status: COMPLETED | OUTPATIENT
Start: 2022-06-02 | End: 2022-06-02

## 2022-06-02 RX ORDER — NAPROXEN 500 MG/1
500 TABLET ORAL 2 TIMES DAILY PRN
Qty: 15 TABLET | Refills: 0 | Status: SHIPPED | OUTPATIENT
Start: 2022-06-02 | End: 2022-06-02 | Stop reason: SDUPTHER

## 2022-06-02 RX ORDER — NAPROXEN 500 MG/1
500 TABLET ORAL 2 TIMES DAILY PRN
Qty: 15 TABLET | Refills: 0 | Status: SHIPPED | OUTPATIENT
Start: 2022-06-02 | End: 2022-07-13 | Stop reason: HOSPADM

## 2022-06-02 RX ADMIN — PROPOFOL 74.9 MG: 10 INJECTION, EMULSION INTRAVENOUS at 08:04

## 2022-06-02 RX ADMIN — SODIUM CHLORIDE 500 ML: 9 INJECTION, SOLUTION INTRAVENOUS at 08:01

## 2022-06-02 NOTE — ED PROVIDER NOTES
MD ATTESTATION NOTE  I wore full protective equipment throughout this patient encounter including a N95 face mask, googles, gown and gloves. Hand hygiene was performed before donning protective equipment and after removal when leaving the room.    The [facility] and I have discussed this patient's history, physical exam, and treatment plan. I have reviewed the documentation and personally had a face to face interaction with the patient. I affirm the [facility] documentation and agree with their diagnostics, findings, treatment, plan, and disposition.    I provided a substantive portion of the care of this patient.  I personally performed the physical exam, in its entirety.  The attached note describes my personal findings.    Marylu Georges is a 62 y.o. female who presents to the ED c/o right hip pain.  Patient states I think my hip is not dislocated.  Patient reports that she was bending over to pick something off, when she stood up she felt a pop in her head.  Patient reports that she fell to 1 knee, was unable to stand afterwards.  Patient complains of right hip pain and difficulty moving the right hip secondary to pain.  Patient denies any weakness or numbness.  Patient denies any head injury, no neck or back pain, no other injury occurred.  Patient reports that she was at baseline health prior to this.  Patient reports history of hip replacement, right hip was replaced 2 years ago, reports that she has dislocated it once before with similar pain.  Patient denies any cardiac or pulmonary problems, reports that she has had anesthesia in the past, no history of difficulties with anesthesia, reports last p.o. intake at approximately 330 this morning.    On exam:  General: NAD.  Head: NCAT.  ENT: nares patent, no scleral icterus  Neck: Supple, trachea midline.  Cardiac: regular rate and rhythm.  Lungs: normal effort.  Abdomen: Soft, NTTP.   Extremities: Moves all extremities well, no peripheral edema  Right hip: Tenderness and  swelling, skin is intact, no redness or warmth.  Leg is shortened and held in a flexed and externally rotated position. Ankle/toes up/down, sensation intact light touch all peripheral nerve distributions, 2+ dorsalis pedis and posterior tibial pulses, brisk cap refill all digits.    Neuro: alert, MAEW, follows commands  Psych: calm, cooperative  Skin: Warm, dry.    Procedural Sedation    Date/Time: 6/2/2022 8:12 AM  Performed by: Carlos Mccarthy MD  Authorized by: Carlos Mccarthy MD     Consent:     Consent obtained:  Verbal    Consent given by:  Patient    Risks discussed:  Allergic reaction, prolonged hypoxia resulting in organ damage, prolonged sedation necessitating reversal, dysrhythmia, inadequate sedation, nausea, respiratory compromise necessitating ventilatory assistance and intubation and vomiting    Alternatives discussed:  Analgesia without sedation and regional anesthesia  Fluker protocol:     Procedure explained and questions answered to patient or proxy's satisfaction: yes      Relevant documents present and verified: yes      Test results available: yes      Imaging studies available: yes      Required blood products, implants, devices, and special equipment available: yes      Site/side marked: yes      Immediately prior to procedure, a time out was called: yes      Patient identity confirmed:  Verbally with patient, arm band, provided demographic data and hospital-assigned identification number  Indications:     Procedure performed:  Dislocation reduction    Procedure necessitating sedation performed by:  Different physician    Intended level of sedation:  Moderate (conscious sedation)  Pre-sedation assessment:     ASA classification: class 2 - patient with mild systemic disease      Pre-sedation assessments completed and reviewed: airway patency, cardiovascular function, hydration status, mental status, nausea/vomiting, pain level and respiratory function    Immediate pre-procedure  details:     Reassessment: Patient reassessed immediately prior to procedure      Reviewed: vital signs, relevant labs/tests and NPO status      Verified: bag valve mask available, emergency equipment available, intubation equipment available, IV patency confirmed, oxygen available and suction available    Procedure details (see MAR for exact dosages):     Sedation start time:  6/2/2022 8:00 AM    Preoxygenation:  Nasal cannula    Sedation:  Propofol    Intra-procedure monitoring:  Blood pressure monitoring, continuous capnometry, continuous pulse oximetry, cardiac monitor, frequent LOC assessments and frequent vital sign checks    Intra-procedure events: none      Sedation end time:  6/2/2022 8:12 AM  Post-procedure details:     Attendance: Constant attendance by certified staff until patient recovered      Recovery: Patient returned to pre-procedure baseline      Post-sedation assessments completed and reviewed: airway patency, cardiovascular function, hydration status, mental status, nausea/vomiting, pain level and respiratory function      Patient is stable for discharge or admission: yes      Procedure completion:  Tolerated well, no immediate complications          Medical Decision Making:  After the initial H&P, I discussed pertinent information from history and physical exam with patient/family.  Discussed differential diagnosis.  Discussed plan for ED evaluation/work-up/treatment.  All questions answered.  Patient/family is agreeable with plan.         Diagnosis  Final diagnoses:   Anterior dislocation of right hip, initial encounter ([facility])        Carlos Mccarthy MD  06/02/22 [id number]

## 2022-06-02 NOTE — ED TRIAGE NOTES
Patient to ED via [facility] from work at [employer] Suspected dislocated right hip. 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 Pain level 7/10. Pulse and sensation intact to RLE. Patient is able to bear weight, but due to pain she does not prefer to do so. History of bilateral hip replacements, with previous dislocation on the right hip.

## 2022-06-02 NOTE — ED PROVIDER NOTES
EMERGENCY DEPARTMENT ENCOUNTER    Room Number:  08/08  Date of encounter:  6/2/2022  PCP: Elie Dixon MD  Historian: Patient  Full history not obtainable due to: None    HPI:  Chief Complaint: Hip pain    Context: Marylu Georges is a 62 y.o. female with a PMH significant for hypertension, vertigo who presents to the ED c/o right hip pain.  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  As she twisted she felt her hip pop and felt intense pain.  She has not been able to ambulate since that time.  She denies any decreased strength or sensation distally.  States that this feels very similar to previous hip dislocation.  She is status post total hip replacement bilaterally.  She had a similar episode approximately 6 months ago.  She has not taken any medications for pain.      MEDICAL RECORD REVIEW:    RIGHT HIP, SINGLE VIEW     HISTORY: Status post closed reduction.     COMPARISON: Comparison is made to the plain film examination of the  right hip from earlier the same day.     FINDINGS: A single AP view of the right hip demonstrates a right hip  prosthesis. There has been successful closed reduction of the dislocated  femoral component. There is no evidence of fracture.     This report was finalized on 11/3/2021 7:37 AM by Dr. Luis M Kaur M.D.           PAST MEDICAL HISTORY    Active Ambulatory Problems     Diagnosis Date Noted   • Malignant neoplasm of overlapping sites of left breast in female, estrogen receptor positive (HCC) 01/22/2019   • Family history of breast cancer in female 01/22/2019   • Syncope 07/30/2019   • Malignant neoplasm of overlapping sites of both breasts in female, estrogen receptor positive (HCC) 11/10/2019   • Hypertension 12/17/2019   • Encounter for long-term (current) use of other medications 06/22/2021   • Drug-induced hepatitis 09/30/2021     Resolved Ambulatory Problems     Diagnosis  Date Noted   • Anemia in neoplastic disease 01/22/2019   • Axillary mass, right 01/22/2019   • Poor venous access 01/30/2019   • Fitting and adjustment of vascular catheter 02/01/2019   • Nausea & vomiting 07/30/2019   • Macrocytosis 07/30/2019   • Immunocompromised state due to drug therapy (HCC) 07/30/2019   • Encounter for venous access device care 10/28/2019   • Acute radiation dermatitis 12/17/2019   • Arthritis of right hip 01/13/2020   • Arthritis of left hip 05/26/2021     Past Medical History:   Diagnosis Date   • Arthritis    • Breast cancer (HCC)    • CVA (cerebral vascular accident) (HCC)    • Hip pain    • History of fracture of leg 1987   • History of radiation therapy    • Limited joint range of motion (ROM)    • Skin sore    • Vertigo          PAST SURGICAL HISTORY  Past Surgical History:   Procedure Laterality Date   • AXILLARY LYMPH NODE BIOPSY/EXCISION Right     LYMPH NODE UNDER RIGHT ARM-MALIGNANT (DOUBLE MASTECTOMY)   • BREAST BIOPSY Left     MALIGNANT   • FRACTURE SURGERY  1987    Leg   • HARDWARE REMOVAL     • MASTECTOMY W/ SENTINEL NODE BIOPSY Bilateral 9/16/2019    Procedure: BILATERLA MODIFIED RADICAL MASTECTOMY WITH BILATERAL SENTINEL LYMPH NODE BIOPSY;  Surgeon: Joby Barron Jr., MD;  Location: [facility];  Service: General   • TOTAL HIP ARTHROPLASTY Right 3/2/2020    Procedure: RIGHT TOTAL HIP ARTHROPLASTY NATALY NAVIGATION;  Surgeon: Luis M Leonard MD;  Location: [facility];  Service: Orthopedics;  Laterality: Right;   • TOTAL HIP ARTHROPLASTY Left 7/20/2021    Procedure: Posterior LEFT TOTAL HIP ARTHROPLASTY NATALY NAVIGATION;  Surgeon: Luis M Leonard MD;  Location: [facility] OR;  Service: Orthopedics;  Laterality: Left;   • VENOUS ACCESS DEVICE (PORT) INSERTION Right 2/1/2019    Procedure: INSERTION VENOUS ACCESS DEVICE;  Surgeon: Joby Barron Jr., MD;  Location: [facility];  Service: General   • VENOUS ACCESS DEVICE (PORT) REMOVAL N/A 10/30/2019    Procedure:  Mediport Removal;  Surgeon: Joby Barron Jr., MD;  Location: [facility] OR;  Service: General         FAMILY HISTORY  Family History   Problem Relation Age of Onset   • Lung cancer Father    • Irritable bowel syndrome Father    • Cancer Mother    • Breast cancer Mother    • Liver disease Mother    • Breast cancer Sister 48   • Breast cancer Maternal Grandmother    • Breast cancer Paternal Grandmother    • Breast cancer Maternal Uncle    • Malig Hyperthermia Neg Hx          SOCIAL HISTORY  Social History     Socioeconomic History   • Marital status:      Spouse name: Cruz   • Number of children: 0   Tobacco Use   • Smoking status: Never Smoker   • Smokeless tobacco: Never Used   Vaping Use   • Vaping Use: Never used   Substance and Sexual Activity   • Alcohol use: Not Currently     Alcohol/week: 0.0 standard drinks     Comment: 2 CUPS DAILY   • Drug use: No   • Sexual activity: Not Currently     Partners: Male     Birth control/protection: Abstinence         ALLERGIES  Benadryl [diphenhydramine], Erythromycin, Levaquin [levofloxacin], and Penicillins        REVIEW OF SYSTEMS  Review of Systems   Constitutional: Negative for chills and fever.   HENT: Negative for congestion.    Eyes: Negative for visual disturbance.   Respiratory: Negative for shortness of breath.    Cardiovascular: Negative for chest pain.   Gastrointestinal: Negative for abdominal pain, nausea and vomiting.   Genitourinary: Negative for difficulty urinating.   Musculoskeletal: Positive for arthralgias and gait problem.   Skin: Negative for wound.   Neurological: Negative for syncope, weakness and numbness.   Psychiatric/Behavioral: Negative for suicidal ideas.      All systems reviewed and marked as negative except as listed in HPI       PHYSICAL EXAM    I have reviewed the triage vital signs and nursing notes.    ED Triage Vitals [06/02/22 0523]   Temp Heart Rate Resp BP SpO2   98.6 °F (37 °C) 101 18 146/98 100 %      Temp src Heart  Rate Source Patient Position BP Location FiO2 (%)   Oral Monitor Lying Right arm --       Physical Exam  Constitutional:       General: She is not in acute distress.     Appearance: She is well-developed.   HENT:      Head: Normocephalic and atraumatic.   Eyes:      General: No scleral icterus.     Conjunctiva/sclera: Conjunctivae normal.   Neck:      Trachea: No tracheal deviation.   Cardiovascular:      Rate and Rhythm: Normal rate and regular rhythm.      Pulses:           Dorsalis pedis pulses are 2+ on the right side and 2+ on the left side.   Pulmonary:      Effort: Pulmonary effort is normal.      Breath sounds: Normal breath sounds.   Abdominal:      Palpations: Abdomen is soft.      Tenderness: There is no abdominal tenderness.   Musculoskeletal:      Cervical back: Normal range of motion.      Right hip: Deformity (Right leg is shortened and internally rotated) present.   Lymphadenopathy:      Cervical: No cervical adenopathy.   Skin:     General: Skin is warm and dry.   Neurological:      Mental Status: She is alert and oriented to person, place, and time.   Psychiatric:         Behavior: Behavior normal.         Vital signs and nursing notes reviewed.            LAB RESULTS  Recent Results (from the past 24 hour(s))   Green Top (Gel)    Collection Time: 06/02/22  5:38 AM   Result Value Ref Range    Extra Tube Hold for add-ons.    Lavender Top    Collection Time: 06/02/22  5:38 AM   Result Value Ref Range    Extra Tube hold for add-on    Light Blue Top    Collection Time: 06/02/22  5:38 AM   Result Value Ref Range    Extra Tube Hold for add-ons.        Ordered the above labs and independently reviewed the results.        RADIOLOGY  XR Hip With or Without Pelvis 1 View Right    Result Date: 6/2/2022  AP VIEW THE PELVIS PLUS SINGLE VIEW RIGHT HIP  HISTORY: Pain in right hip after a fall  COMPARISON: 11/02/2022  FINDINGS: The patient is status post bilateral hip arthroplasties. The patient's right femoral  "component is displaced superolaterally relative to the acetabular component. Left hip arthroplasty appears to project in expected position. No acute fracture is seen. There are degenerative changes involving the lumbosacral spine.      Right hip dislocation.  This report was finalized on 6/2/2022 6:05 AM by Dr. Candice Bonner M.D.        I ordered the above noted radiological studies. Independently reviewed by me and discussed with radiologist.  See dictation above for official radiology interpretation.      PROCEDURES    Lower Extremity Dislocation    Date/Time: 6/2/2022 8:10 AM  Performed by: Sridhar Fine PA  Authorized by: Carlos Mccarthy MD   Consent: Verbal consent obtained.  Consent given by: patient  Patient understanding: patient states understanding of the procedure being performed  Patient consent: the patient's understanding of the procedure matches consent given  Procedure consent: procedure consent matches procedure scheduled  Relevant documents: relevant documents present and verified  Test results: test results available and properly labeled  Imaging studies: imaging studies available  Patient identity confirmed: verbally with patient  Time out: Immediately prior to procedure a \"time out\" was called to verify the correct patient, procedure, equipment, support staff and site/side marked as required.  Injury location: hip  Location details: right hip  Injury type: dislocation  Dislocation type: posterior  Spontaneous dislocation: yes  Prosthesis: yes  Pre-procedure neurovascular assessment: neurovascularly intact  Pre-procedure distal perfusion: normal  Pre-procedure neurological function: normal  Pre-procedure range of motion: reduced    Anesthesia:  Local anesthesia used: no    Sedation:  Patient sedated: yes  Sedatives: propofol  Vitals: Vital signs were monitored during sedation.    Manipulation performed: yes  Reduction method: traction and counter traction  Reduction successful: " yes  X-ray confirmed reduction: yes  Immobilization: Knee immobilizer.  Post-procedure neurovascular assessment: post-procedure neurovascularly intact  Post-procedure distal perfusion: normal  Post-procedure neurological function: normal  Post-procedure range of motion: improved  Patient tolerance: patient tolerated the procedure well with no immediate complications              MEDICATIONS GIVEN IN ER    Medications   sodium chloride 0.9 % bolus 500 mL (0 mL Intravenous Stopped 6/2/22 0831)   Propofol (DIPRIVAN) injection 74.9 mg (74.9 mg Intravenous Given 6/2/22 0804)         PROGRESS, DATA ANALYSIS, CONSULTS, AND MEDICAL DECISION MAKING    All labs have been independently reviewed by me.  All radiology studies have been reviewed by me.   EKG's independently reviewed by me.  Discussion below represents my analysis of pertinent findings related to patient's condition, differential diagnosis, treatment plan and final disposition.    DIFFERENTIAL DIAGNOSIS INCLUDE BUT NOT LIMITED TO:     Hip contusion, hip dislocation, hip fracture         AS OF 08:32 EDT VITALS:    BP - 158/98  HR - 96  TEMP - 98.6 °F (37 °C) (Oral)  02 SATS - 100%    0833 I rechecked the patient.  I discussed the patient's radiology findings (including all incidental findings), diagnosis, and plan for discharge.  A repeat exam reveals no new worrisome changes from my initial exam findings.  The patient understands that the fact that they are being discharged does not denote that nothing is abnormal, it indicates that no clinical emergency is present and that they must follow-up as directed in order to properly maintain their health.  Follow-up instructions (specifically listed below) and return to ER precautions were given at this time.  I specifically instructed the patient to follow-up with their PCP.  The patient understands and agrees with the plan, and is ready for discharge.  All questions answered.      DIAGNOSIS  Final diagnoses:   Anterior  dislocation of right hip, initial encounter (HCC)         DISPOSITION  D/c    Pt masked in first look. I wore a surgical mask throughout my encounters with the pt. I performed hand hygiene on entry into the pt room and upon exit.      Sridhar Fine PA  06/02/22 0834       Sridhar Fine PA  06/02/22 0845

## 2022-06-06 RX ORDER — GABAPENTIN 100 MG/1
CAPSULE ORAL
Qty: 90 CAPSULE | Refills: 0 | Status: SHIPPED | OUTPATIENT
Start: 2022-06-06 | End: 2022-07-13 | Stop reason: HOSPADM

## 2022-06-27 ENCOUNTER — APPOINTMENT (OUTPATIENT)
Dept: CARDIOLOGY | Facility: HOSPITAL | Age: 62
End: 2022-06-27

## 2022-06-27 ENCOUNTER — HOSPITAL ENCOUNTER (INPATIENT)
Facility: HOSPITAL | Age: 62
LOS: 14 days | Discharge: NURSING, REHAB OR LONG-TERM CARE | End: 2022-07-13
Attending: EMERGENCY MEDICINE | Admitting: ORTHOPAEDIC SURGERY

## 2022-06-27 ENCOUNTER — APPOINTMENT (OUTPATIENT)
Dept: GENERAL RADIOLOGY | Facility: HOSPITAL | Age: 62
End: 2022-06-27

## 2022-06-27 DIAGNOSIS — I48.91 ATRIAL FIBRILLATION, UNSPECIFIED TYPE: ICD-10-CM

## 2022-06-27 DIAGNOSIS — M00.272 STREPTOCOCCAL ARTHRITIS OF LEFT ANKLE: Primary | ICD-10-CM

## 2022-06-27 DIAGNOSIS — R79.82 ELEVATED C-REACTIVE PROTEIN (CRP): ICD-10-CM

## 2022-06-27 DIAGNOSIS — R60.0 EDEMA OF BOTH UPPER EXTREMITIES: ICD-10-CM

## 2022-06-27 DIAGNOSIS — R70.0 ELEVATED SED RATE: ICD-10-CM

## 2022-06-27 DIAGNOSIS — R79.89 ELEVATED LFTS: ICD-10-CM

## 2022-06-27 PROBLEM — I89.0 LYMPHEDEMA OF UPPER EXTREMITY, BILATERAL: Status: ACTIVE | Noted: 2022-06-27

## 2022-06-27 PROBLEM — R21 RASH: Status: ACTIVE | Noted: 2022-06-27

## 2022-06-27 PROBLEM — R74.01 TRANSAMINITIS: Status: ACTIVE | Noted: 2022-06-27

## 2022-06-27 PROBLEM — I50.33 ACUTE ON CHRONIC HEART FAILURE WITH PRESERVED EJECTION FRACTION (HFPEF) (HCC): Status: ACTIVE | Noted: 2022-06-27

## 2022-06-27 LAB
ALBUMIN SERPL-MCNC: 3.4 G/DL (ref 3.5–5.2)
ALBUMIN/GLOB SERPL: 1 G/DL
ALP SERPL-CCNC: 154 U/L (ref 39–117)
ALT SERPL W P-5'-P-CCNC: 71 U/L (ref 1–33)
ANION GAP SERPL CALCULATED.3IONS-SCNC: 14.6 MMOL/L (ref 5–15)
AORTIC DIMENSIONLESS INDEX: 0.7 (DI)
AST SERPL-CCNC: 42 U/L (ref 1–32)
BACTERIA UR QL AUTO: ABNORMAL /HPF
BH CV ECHO MEAS - ACS: 1.79 CM
BH CV ECHO MEAS - AO MAX PG: 7.5 MMHG
BH CV ECHO MEAS - AO MEAN PG: 5.1 MMHG
BH CV ECHO MEAS - AO ROOT DIAM: 2.9 CM
BH CV ECHO MEAS - AO V2 MAX: 137.3 CM/SEC
BH CV ECHO MEAS - AO V2 VTI: 22.5 CM
BH CV ECHO MEAS - AVA(I,D): 1.75 CM2
BH CV ECHO MEAS - EDV(CUBED): 79.8 ML
BH CV ECHO MEAS - EDV(MOD-SP2): 81 ML
BH CV ECHO MEAS - EDV(MOD-SP4): 80 ML
BH CV ECHO MEAS - EF(MOD-BP): 55 %
BH CV ECHO MEAS - EF(MOD-SP2): 49.4 %
BH CV ECHO MEAS - EF(MOD-SP4): 58.8 %
BH CV ECHO MEAS - ESV(CUBED): 19.6 ML
BH CV ECHO MEAS - ESV(MOD-SP2): 41 ML
BH CV ECHO MEAS - ESV(MOD-SP4): 33 ML
BH CV ECHO MEAS - FS: 37.3 %
BH CV ECHO MEAS - IVS/LVPW: 1.08 CM
BH CV ECHO MEAS - IVSD: 1.1 CM
BH CV ECHO MEAS - LAT PEAK E' VEL: 12 CM/SEC
BH CV ECHO MEAS - LV DIASTOLIC VOL/BSA (35-75): 44.6 CM2
BH CV ECHO MEAS - LV MASS(C)D: 155.7 GRAMS
BH CV ECHO MEAS - LV MAX PG: 3.9 MMHG
BH CV ECHO MEAS - LV MEAN PG: 2.15 MMHG
BH CV ECHO MEAS - LV SYSTOLIC VOL/BSA (12-30): 18.4 CM2
BH CV ECHO MEAS - LV V1 MAX: 98.7 CM/SEC
BH CV ECHO MEAS - LV V1 VTI: 15.2 CM
BH CV ECHO MEAS - LVIDD: 4.3 CM
BH CV ECHO MEAS - LVIDS: 2.7 CM
BH CV ECHO MEAS - LVOT AREA: 2.6 CM2
BH CV ECHO MEAS - LVOT DIAM: 1.81 CM
BH CV ECHO MEAS - LVPWD: 1.02 CM
BH CV ECHO MEAS - MED PEAK E' VEL: 12 CM/SEC
BH CV ECHO MEAS - MV DEC SLOPE: 467.3 CM/SEC2
BH CV ECHO MEAS - MV DEC TIME: 170 MSEC
BH CV ECHO MEAS - MV E MAX VEL: 92.8 CM/SEC
BH CV ECHO MEAS - MV MEAN PG: 1.92 MMHG
BH CV ECHO MEAS - MV P1/2T: 70.6 MSEC
BH CV ECHO MEAS - MV V2 VTI: 21.9 CM
BH CV ECHO MEAS - MVA(P1/2T): 3.1 CM2
BH CV ECHO MEAS - MVA(VTI): 1.79 CM2
BH CV ECHO MEAS - PA ACC SLOPE: 1193 CM/SEC2
BH CV ECHO MEAS - PA ACC TIME: 0.07 SEC
BH CV ECHO MEAS - PA PR(ACCEL): 45.7 MMHG
BH CV ECHO MEAS - PA V2 MAX: 92.3 CM/SEC
BH CV ECHO MEAS - PULM DIAS VEL: 57.8 CM/SEC
BH CV ECHO MEAS - PULM S/D: 0.55
BH CV ECHO MEAS - PULM SYS VEL: 31.6 CM/SEC
BH CV ECHO MEAS - QP/QS: 0.52
BH CV ECHO MEAS - RAP SYSTOLE: 3 MMHG
BH CV ECHO MEAS - RV MAX PG: 0.88 MMHG
BH CV ECHO MEAS - RV V1 MAX: 46.9 CM/SEC
BH CV ECHO MEAS - RV V1 VTI: 6.6 CM
BH CV ECHO MEAS - RVOT DIAM: 1.99 CM
BH CV ECHO MEAS - RVSP: 41.1 MMHG
BH CV ECHO MEAS - SI(MOD-SP2): 22.3 ML/M2
BH CV ECHO MEAS - SI(MOD-SP4): 26.2 ML/M2
BH CV ECHO MEAS - SV(LVOT): 39.3 ML
BH CV ECHO MEAS - SV(MOD-SP2): 40 ML
BH CV ECHO MEAS - SV(MOD-SP4): 47 ML
BH CV ECHO MEAS - SV(RVOT): 20.4 ML
BH CV ECHO MEAS - TAPSE (>1.6): 1.6 CM
BH CV ECHO MEAS - TR MAX PG: 38.1 MMHG
BH CV ECHO MEAS - TR MAX VEL: 308.6 CM/SEC
BH CV ECHO MEASUREMENTS AVERAGE E/E' RATIO: 7.73
BH CV LOWER VASCULAR LEFT COMMON FEMORAL AUGMENT: NORMAL
BH CV LOWER VASCULAR LEFT COMMON FEMORAL COMPETENT: NORMAL
BH CV LOWER VASCULAR LEFT COMMON FEMORAL COMPRESS: NORMAL
BH CV LOWER VASCULAR LEFT COMMON FEMORAL PHASIC: NORMAL
BH CV LOWER VASCULAR LEFT COMMON FEMORAL SPONT: NORMAL
BH CV LOWER VASCULAR LEFT DISTAL FEMORAL COMPRESS: NORMAL
BH CV LOWER VASCULAR LEFT GASTRONEMIUS COMPRESS: NORMAL
BH CV LOWER VASCULAR LEFT GREATER SAPH AK COMPRESS: NORMAL
BH CV LOWER VASCULAR LEFT GREATER SAPH BK COMPRESS: NORMAL
BH CV LOWER VASCULAR LEFT LESSER SAPH COMPRESS: NORMAL
BH CV LOWER VASCULAR LEFT MID FEMORAL AUGMENT: NORMAL
BH CV LOWER VASCULAR LEFT MID FEMORAL COMPETENT: NORMAL
BH CV LOWER VASCULAR LEFT MID FEMORAL COMPRESS: NORMAL
BH CV LOWER VASCULAR LEFT MID FEMORAL PHASIC: NORMAL
BH CV LOWER VASCULAR LEFT MID FEMORAL SPONT: NORMAL
BH CV LOWER VASCULAR LEFT PERONEAL COMPRESS: NORMAL
BH CV LOWER VASCULAR LEFT POPLITEAL AUGMENT: NORMAL
BH CV LOWER VASCULAR LEFT POPLITEAL COMPETENT: NORMAL
BH CV LOWER VASCULAR LEFT POPLITEAL COMPRESS: NORMAL
BH CV LOWER VASCULAR LEFT POPLITEAL PHASIC: NORMAL
BH CV LOWER VASCULAR LEFT POPLITEAL SPONT: NORMAL
BH CV LOWER VASCULAR LEFT POSTERIOR TIBIAL COMPRESS: NORMAL
BH CV LOWER VASCULAR LEFT PROFUNDA FEMORAL COMPRESS: NORMAL
BH CV LOWER VASCULAR LEFT PROXIMAL FEMORAL COMPRESS: NORMAL
BH CV LOWER VASCULAR LEFT SAPHENOFEMORAL JUNCTION COMPRESS: NORMAL
BH CV LOWER VASCULAR RIGHT COMMON FEMORAL AUGMENT: NORMAL
BH CV LOWER VASCULAR RIGHT COMMON FEMORAL COMPETENT: NORMAL
BH CV LOWER VASCULAR RIGHT COMMON FEMORAL COMPRESS: NORMAL
BH CV LOWER VASCULAR RIGHT COMMON FEMORAL PHASIC: NORMAL
BH CV LOWER VASCULAR RIGHT COMMON FEMORAL SPONT: NORMAL
BH CV LOWER VASCULAR RIGHT DISTAL FEMORAL COMPRESS: NORMAL
BH CV LOWER VASCULAR RIGHT GASTRONEMIUS COMPRESS: NORMAL
BH CV LOWER VASCULAR RIGHT GREATER SAPH AK COMPRESS: NORMAL
BH CV LOWER VASCULAR RIGHT GREATER SAPH BK COMPRESS: NORMAL
BH CV LOWER VASCULAR RIGHT LESSER SAPH COMPRESS: NORMAL
BH CV LOWER VASCULAR RIGHT MID FEMORAL AUGMENT: NORMAL
BH CV LOWER VASCULAR RIGHT MID FEMORAL COMPETENT: NORMAL
BH CV LOWER VASCULAR RIGHT MID FEMORAL COMPRESS: NORMAL
BH CV LOWER VASCULAR RIGHT MID FEMORAL PHASIC: NORMAL
BH CV LOWER VASCULAR RIGHT MID FEMORAL SPONT: NORMAL
BH CV LOWER VASCULAR RIGHT PERONEAL COMPRESS: NORMAL
BH CV LOWER VASCULAR RIGHT POPLITEAL AUGMENT: NORMAL
BH CV LOWER VASCULAR RIGHT POPLITEAL COMPETENT: NORMAL
BH CV LOWER VASCULAR RIGHT POPLITEAL COMPRESS: NORMAL
BH CV LOWER VASCULAR RIGHT POPLITEAL PHASIC: NORMAL
BH CV LOWER VASCULAR RIGHT POPLITEAL SPONT: NORMAL
BH CV LOWER VASCULAR RIGHT POSTERIOR TIBIAL COMPRESS: NORMAL
BH CV LOWER VASCULAR RIGHT PROFUNDA FEMORAL COMPRESS: NORMAL
BH CV LOWER VASCULAR RIGHT PROXIMAL FEMORAL COMPRESS: NORMAL
BH CV LOWER VASCULAR RIGHT SAPHENOFEMORAL JUNCTION COMPRESS: NORMAL
BH CV UPPER VENOUS LEFT AXILLARY AUGMENT: NORMAL
BH CV UPPER VENOUS LEFT AXILLARY COMPRESS: NORMAL
BH CV UPPER VENOUS LEFT AXILLARY PHASIC: NORMAL
BH CV UPPER VENOUS LEFT AXILLARY SPONT: NORMAL
BH CV UPPER VENOUS LEFT BASILIC FOREARM COMPRESS: NORMAL
BH CV UPPER VENOUS LEFT BASILIC UPPER COMPRESS: NORMAL
BH CV UPPER VENOUS LEFT BRACHIAL COMPRESS: NORMAL
BH CV UPPER VENOUS LEFT CEPHALIC FOREARM COMPRESS: NORMAL
BH CV UPPER VENOUS LEFT CEPHALIC UPPER COMPRESS: NORMAL
BH CV UPPER VENOUS LEFT INTERNAL JUGULAR AUGMENT: NORMAL
BH CV UPPER VENOUS LEFT INTERNAL JUGULAR COMPRESS: NORMAL
BH CV UPPER VENOUS LEFT INTERNAL JUGULAR PHASIC: NORMAL
BH CV UPPER VENOUS LEFT INTERNAL JUGULAR SPONT: NORMAL
BH CV UPPER VENOUS LEFT RADIAL COMPRESS: NORMAL
BH CV UPPER VENOUS LEFT SUBCLAVIAN AUGMENT: NORMAL
BH CV UPPER VENOUS LEFT SUBCLAVIAN COMPRESS: NORMAL
BH CV UPPER VENOUS LEFT SUBCLAVIAN PHASIC: NORMAL
BH CV UPPER VENOUS LEFT SUBCLAVIAN SPONT: NORMAL
BH CV UPPER VENOUS LEFT ULNAR COMPRESS: NORMAL
BH CV UPPER VENOUS RIGHT AXILLARY AUGMENT: NORMAL
BH CV UPPER VENOUS RIGHT AXILLARY COMPRESS: NORMAL
BH CV UPPER VENOUS RIGHT AXILLARY PHASIC: NORMAL
BH CV UPPER VENOUS RIGHT AXILLARY SPONT: NORMAL
BH CV UPPER VENOUS RIGHT BASILIC FOREARM COMPRESS: NORMAL
BH CV UPPER VENOUS RIGHT BASILIC UPPER COMPRESS: NORMAL
BH CV UPPER VENOUS RIGHT BRACHIAL COMPRESS: NORMAL
BH CV UPPER VENOUS RIGHT CEPHALIC FOREARM COMPRESS: NORMAL
BH CV UPPER VENOUS RIGHT CEPHALIC UPPER COMPRESS: NORMAL
BH CV UPPER VENOUS RIGHT INTERNAL JUGULAR AUGMENT: NORMAL
BH CV UPPER VENOUS RIGHT INTERNAL JUGULAR COMPRESS: NORMAL
BH CV UPPER VENOUS RIGHT INTERNAL JUGULAR PHASIC: NORMAL
BH CV UPPER VENOUS RIGHT INTERNAL JUGULAR SPONT: NORMAL
BH CV UPPER VENOUS RIGHT RADIAL COMPRESS: NORMAL
BH CV UPPER VENOUS RIGHT SUBCLAVIAN AUGMENT: NORMAL
BH CV UPPER VENOUS RIGHT SUBCLAVIAN COMPRESS: NORMAL
BH CV UPPER VENOUS RIGHT SUBCLAVIAN PHASIC: NORMAL
BH CV UPPER VENOUS RIGHT SUBCLAVIAN SPONT: NORMAL
BH CV UPPER VENOUS RIGHT ULNAR COMPRESS: NORMAL
BH CV VAS BP RIGHT ARM: NORMAL MMHG
BH CV XLRA - RV BASE: 3 CM
BH CV XLRA - RV LENGTH: 7 CM
BH CV XLRA - RV MID: 2 CM
BH CV XLRA - TDI S': 10 CM/SEC
BILIRUB SERPL-MCNC: 0.7 MG/DL (ref 0–1.2)
BILIRUB UR QL STRIP: NEGATIVE
BUN SERPL-MCNC: 23 MG/DL (ref 8–23)
BUN/CREAT SERPL: 23.7 (ref 7–25)
CALCIUM SPEC-SCNC: 9.1 MG/DL (ref 8.6–10.5)
CHLORIDE SERPL-SCNC: 98 MMOL/L (ref 98–107)
CLARITY UR: CLEAR
CO2 SERPL-SCNC: 20.4 MMOL/L (ref 22–29)
COLOR UR: ABNORMAL
CREAT SERPL-MCNC: 0.97 MG/DL (ref 0.57–1)
CRP SERPL-MCNC: 40.24 MG/DL (ref 0–0.5)
DEPRECATED RDW RBC AUTO: 46.1 FL (ref 37–54)
EGFRCR SERPLBLD CKD-EPI 2021: 65.4 ML/MIN/1.73
ERYTHROCYTE [DISTWIDTH] IN BLOOD BY AUTOMATED COUNT: 13.9 % (ref 12.3–15.4)
ERYTHROCYTE [SEDIMENTATION RATE] IN BLOOD: 98 MM/HR (ref 0–30)
GLOBULIN UR ELPH-MCNC: 3.5 GM/DL
GLUCOSE SERPL-MCNC: 131 MG/DL (ref 65–99)
GLUCOSE UR STRIP-MCNC: NEGATIVE MG/DL
HCT VFR BLD AUTO: 37.7 % (ref 34–46.6)
HGB BLD-MCNC: 12.7 G/DL (ref 12–15.9)
HGB UR QL STRIP.AUTO: NEGATIVE
HYALINE CASTS UR QL AUTO: ABNORMAL /LPF
KETONES UR QL STRIP: ABNORMAL
LEFT ATRIUM VOLUME INDEX: 52 ML/M2
LEUKOCYTE ESTERASE UR QL STRIP.AUTO: ABNORMAL
LV EF 2D ECHO EST: 55 %
LYMPHOCYTES # BLD MANUAL: 0.29 10*3/MM3 (ref 0.7–3.1)
LYMPHOCYTES NFR BLD MANUAL: 3.6 % (ref 5–12)
MAXIMAL PREDICTED HEART RATE: 156 BPM
MCH RBC QN AUTO: 30.8 PG (ref 26.6–33)
MCHC RBC AUTO-ENTMCNC: 33.7 G/DL (ref 31.5–35.7)
MCV RBC AUTO: 91.5 FL (ref 79–97)
MONOCYTES # BLD: 0.29 10*3/MM3 (ref 0.1–0.9)
NEUTROPHILS # BLD AUTO: 7.48 10*3/MM3 (ref 1.7–7)
NEUTROPHILS NFR BLD MANUAL: 92.8 % (ref 42.7–76)
NITRITE UR QL STRIP: NEGATIVE
NT-PROBNP SERPL-MCNC: 2048 PG/ML (ref 0–900)
PH UR STRIP.AUTO: 5.5 [PH] (ref 5–8)
PLAT MORPH BLD: NORMAL
PLATELET # BLD AUTO: 144 10*3/MM3 (ref 140–450)
PMV BLD AUTO: 9.7 FL (ref 6–12)
POTASSIUM SERPL-SCNC: 3.5 MMOL/L (ref 3.5–5.2)
PROT SERPL-MCNC: 6.9 G/DL (ref 6–8.5)
PROT UR QL STRIP: ABNORMAL
QT INTERVAL: 309 MS
RBC # BLD AUTO: 4.12 10*6/MM3 (ref 3.77–5.28)
RBC # UR STRIP: ABNORMAL /HPF
RBC MORPH BLD: NORMAL
REF LAB TEST METHOD: ABNORMAL
SARS-COV-2 ORF1AB RESP QL NAA+PROBE: NOT DETECTED
SODIUM SERPL-SCNC: 133 MMOL/L (ref 136–145)
SP GR UR STRIP: 1.03 (ref 1–1.03)
SQUAMOUS #/AREA URNS HPF: ABNORMAL /HPF
STRESS TARGET HR: 133 BPM
T4 FREE SERPL-MCNC: 1.36 NG/DL (ref 0.93–1.7)
TROPONIN T SERPL-MCNC: <0.01 NG/ML (ref 0–0.03)
TSH SERPL DL<=0.05 MIU/L-ACNC: 1.82 UIU/ML (ref 0.27–4.2)
UROBILINOGEN UR QL STRIP: ABNORMAL
VARIANT LYMPHS NFR BLD MANUAL: 3.6 % (ref 19.6–45.3)
WBC # UR STRIP: ABNORMAL /HPF
WBC MORPH BLD: NORMAL
WBC NRBC COR # BLD: 8.06 10*3/MM3 (ref 3.4–10.8)

## 2022-06-27 PROCEDURE — 25010000002 ENOXAPARIN PER 10 MG: Performed by: NURSE PRACTITIONER

## 2022-06-27 PROCEDURE — 71045 X-RAY EXAM CHEST 1 VIEW: CPT

## 2022-06-27 PROCEDURE — 73130 X-RAY EXAM OF HAND: CPT

## 2022-06-27 PROCEDURE — 81001 URINALYSIS AUTO W/SCOPE: CPT | Performed by: EMERGENCY MEDICINE

## 2022-06-27 PROCEDURE — 84439 ASSAY OF FREE THYROXINE: CPT | Performed by: EMERGENCY MEDICINE

## 2022-06-27 PROCEDURE — 83880 ASSAY OF NATRIURETIC PEPTIDE: CPT | Performed by: EMERGENCY MEDICINE

## 2022-06-27 PROCEDURE — 84484 ASSAY OF TROPONIN QUANT: CPT | Performed by: EMERGENCY MEDICINE

## 2022-06-27 PROCEDURE — G0378 HOSPITAL OBSERVATION PER HR: HCPCS

## 2022-06-27 PROCEDURE — 93970 EXTREMITY STUDY: CPT

## 2022-06-27 PROCEDURE — 99214 OFFICE O/P EST MOD 30 MIN: CPT | Performed by: INTERNAL MEDICINE

## 2022-06-27 PROCEDURE — 86140 C-REACTIVE PROTEIN: CPT | Performed by: EMERGENCY MEDICINE

## 2022-06-27 PROCEDURE — 85007 BL SMEAR W/DIFF WBC COUNT: CPT | Performed by: EMERGENCY MEDICINE

## 2022-06-27 PROCEDURE — 93306 TTE W/DOPPLER COMPLETE: CPT

## 2022-06-27 PROCEDURE — 93005 ELECTROCARDIOGRAM TRACING: CPT | Performed by: EMERGENCY MEDICINE

## 2022-06-27 PROCEDURE — 93010 ELECTROCARDIOGRAM REPORT: CPT | Performed by: INTERNAL MEDICINE

## 2022-06-27 PROCEDURE — 93306 TTE W/DOPPLER COMPLETE: CPT | Performed by: INTERNAL MEDICINE

## 2022-06-27 PROCEDURE — 85652 RBC SED RATE AUTOMATED: CPT | Performed by: EMERGENCY MEDICINE

## 2022-06-27 PROCEDURE — U0004 COV-19 TEST NON-CDC HGH THRU: HCPCS | Performed by: EMERGENCY MEDICINE

## 2022-06-27 PROCEDURE — 99284 EMERGENCY DEPT VISIT MOD MDM: CPT

## 2022-06-27 PROCEDURE — 80050 GENERAL HEALTH PANEL: CPT | Performed by: EMERGENCY MEDICINE

## 2022-06-27 PROCEDURE — 25010000002 CEFAZOLIN IN DEXTROSE 2-4 GM/100ML-% SOLUTION: Performed by: STUDENT IN AN ORGANIZED HEALTH CARE EDUCATION/TRAINING PROGRAM

## 2022-06-27 RX ORDER — NITROGLYCERIN 0.4 MG/1
0.4 TABLET SUBLINGUAL
Status: DISCONTINUED | OUTPATIENT
Start: 2022-06-27 | End: 2022-07-13 | Stop reason: HOSPADM

## 2022-06-27 RX ORDER — CEFAZOLIN SODIUM 2 G/100ML
2 INJECTION, SOLUTION INTRAVENOUS EVERY 8 HOURS
Status: DISCONTINUED | OUTPATIENT
Start: 2022-06-27 | End: 2022-06-28

## 2022-06-27 RX ORDER — ACETAMINOPHEN 325 MG/1
650 TABLET ORAL EVERY 4 HOURS PRN
Status: DISCONTINUED | OUTPATIENT
Start: 2022-06-27 | End: 2022-06-27

## 2022-06-27 RX ORDER — POLYETHYLENE GLYCOL 3350 17 G/17G
17 POWDER, FOR SOLUTION ORAL DAILY PRN
Status: DISCONTINUED | OUTPATIENT
Start: 2022-06-27 | End: 2022-06-27

## 2022-06-27 RX ORDER — HYDROXYZINE PAMOATE 25 MG/1
25 CAPSULE ORAL 3 TIMES DAILY
Status: DISCONTINUED | OUTPATIENT
Start: 2022-06-27 | End: 2022-07-11

## 2022-06-27 RX ORDER — BISACODYL 10 MG
10 SUPPOSITORY, RECTAL RECTAL DAILY PRN
Status: DISCONTINUED | OUTPATIENT
Start: 2022-06-27 | End: 2022-07-13 | Stop reason: HOSPADM

## 2022-06-27 RX ORDER — BISACODYL 5 MG/1
5 TABLET, DELAYED RELEASE ORAL DAILY PRN
Status: DISCONTINUED | OUTPATIENT
Start: 2022-06-27 | End: 2022-07-13 | Stop reason: HOSPADM

## 2022-06-27 RX ORDER — HYDROCODONE BITARTRATE AND ACETAMINOPHEN 5; 325 MG/1; MG/1
1 TABLET ORAL EVERY 6 HOURS PRN
Status: DISCONTINUED | OUTPATIENT
Start: 2022-06-27 | End: 2022-06-28

## 2022-06-27 RX ORDER — ACETAMINOPHEN 650 MG/1
650 SUPPOSITORY RECTAL EVERY 4 HOURS PRN
Status: DISCONTINUED | OUTPATIENT
Start: 2022-06-27 | End: 2022-06-27

## 2022-06-27 RX ORDER — ONDANSETRON 2 MG/ML
4 INJECTION INTRAMUSCULAR; INTRAVENOUS EVERY 6 HOURS PRN
Status: DISCONTINUED | OUTPATIENT
Start: 2022-06-27 | End: 2022-07-13 | Stop reason: HOSPADM

## 2022-06-27 RX ORDER — AMOXICILLIN 250 MG
2 CAPSULE ORAL 2 TIMES DAILY
Status: DISCONTINUED | OUTPATIENT
Start: 2022-06-27 | End: 2022-06-27

## 2022-06-27 RX ORDER — SODIUM CHLORIDE 0.9 % (FLUSH) 0.9 %
10 SYRINGE (ML) INJECTION EVERY 12 HOURS SCHEDULED
Status: DISCONTINUED | OUTPATIENT
Start: 2022-06-27 | End: 2022-07-13 | Stop reason: HOSPADM

## 2022-06-27 RX ORDER — ACETAMINOPHEN 160 MG/5ML
650 SOLUTION ORAL EVERY 4 HOURS PRN
Status: DISCONTINUED | OUTPATIENT
Start: 2022-06-27 | End: 2022-06-27

## 2022-06-27 RX ORDER — POLYETHYLENE GLYCOL 3350 17 G/17G
17 POWDER, FOR SOLUTION ORAL DAILY PRN
Status: DISCONTINUED | OUTPATIENT
Start: 2022-06-27 | End: 2022-07-11

## 2022-06-27 RX ORDER — AMOXICILLIN 250 MG
2 CAPSULE ORAL 2 TIMES DAILY PRN
Status: DISCONTINUED | OUTPATIENT
Start: 2022-06-27 | End: 2022-07-11

## 2022-06-27 RX ORDER — CLOBETASOL PROPIONATE 0.5 MG/G
1 CREAM TOPICAL EVERY 12 HOURS SCHEDULED
Status: DISCONTINUED | OUTPATIENT
Start: 2022-06-27 | End: 2022-07-13 | Stop reason: HOSPADM

## 2022-06-27 RX ORDER — METOPROLOL SUCCINATE 25 MG/1
25 TABLET, EXTENDED RELEASE ORAL DAILY
Status: DISCONTINUED | OUTPATIENT
Start: 2022-06-27 | End: 2022-06-27

## 2022-06-27 RX ORDER — ONDANSETRON 4 MG/1
4 TABLET, FILM COATED ORAL EVERY 6 HOURS PRN
Status: DISCONTINUED | OUTPATIENT
Start: 2022-06-27 | End: 2022-07-13 | Stop reason: HOSPADM

## 2022-06-27 RX ORDER — ENOXAPARIN SODIUM 100 MG/ML
40 INJECTION SUBCUTANEOUS EVERY 24 HOURS
Status: DISCONTINUED | OUTPATIENT
Start: 2022-06-27 | End: 2022-06-27

## 2022-06-27 RX ORDER — SODIUM CHLORIDE 0.9 % (FLUSH) 0.9 %
10 SYRINGE (ML) INJECTION AS NEEDED
Status: DISCONTINUED | OUTPATIENT
Start: 2022-06-27 | End: 2022-07-13 | Stop reason: HOSPADM

## 2022-06-27 RX ORDER — ENOXAPARIN SODIUM 100 MG/ML
1 INJECTION SUBCUTANEOUS EVERY 12 HOURS
Status: DISCONTINUED | OUTPATIENT
Start: 2022-06-27 | End: 2022-06-30

## 2022-06-27 RX ORDER — BISACODYL 10 MG
10 SUPPOSITORY, RECTAL RECTAL DAILY PRN
Status: DISCONTINUED | OUTPATIENT
Start: 2022-06-27 | End: 2022-06-27

## 2022-06-27 RX ORDER — BISACODYL 5 MG/1
5 TABLET, DELAYED RELEASE ORAL DAILY PRN
Status: DISCONTINUED | OUTPATIENT
Start: 2022-06-27 | End: 2022-06-27

## 2022-06-27 RX ADMIN — CEFAZOLIN SODIUM 2 G: 2 INJECTION, SOLUTION INTRAVENOUS at 18:29

## 2022-06-27 RX ADMIN — METOPROLOL TARTRATE 25 MG: 25 TABLET, FILM COATED ORAL at 15:37

## 2022-06-27 RX ADMIN — ENOXAPARIN SODIUM 70 MG: 100 INJECTION SUBCUTANEOUS at 15:18

## 2022-06-27 RX ADMIN — METOPROLOL TARTRATE 25 MG: 25 TABLET, FILM COATED ORAL at 21:41

## 2022-06-27 RX ADMIN — HYDROCODONE BITARTRATE AND ACETAMINOPHEN 1 TABLET: 5; 325 TABLET ORAL at 18:52

## 2022-06-27 RX ADMIN — HYDROXYZINE PAMOATE 25 MG: 25 CAPSULE ORAL at 21:36

## 2022-06-27 RX ADMIN — Medication 10 ML: at 21:39

## 2022-06-27 RX ADMIN — CLOBETASOL PROPIONATE 1 APPLICATION: 0.5 CREAM TOPICAL at 21:38

## 2022-06-27 RX ADMIN — Medication 10 ML: at 15:21

## 2022-06-28 ENCOUNTER — APPOINTMENT (OUTPATIENT)
Dept: GENERAL RADIOLOGY | Facility: HOSPITAL | Age: 62
End: 2022-06-28

## 2022-06-28 PROBLEM — M19.90 INFLAMMATORY ARTHRITIS: Status: ACTIVE | Noted: 2022-06-28

## 2022-06-28 LAB
ALBUMIN SERPL-MCNC: 3 G/DL (ref 3.5–5.2)
ALBUMIN/GLOB SERPL: 1 G/DL
ALP SERPL-CCNC: 142 U/L (ref 39–117)
ALT SERPL W P-5'-P-CCNC: 46 U/L (ref 1–33)
ANION GAP SERPL CALCULATED.3IONS-SCNC: 16 MMOL/L (ref 5–15)
APPEARANCE FLD: ABNORMAL
AST SERPL-CCNC: 29 U/L (ref 1–32)
BILIRUB SERPL-MCNC: 0.5 MG/DL (ref 0–1.2)
BUN SERPL-MCNC: 18 MG/DL (ref 8–23)
BUN/CREAT SERPL: 23.7 (ref 7–25)
CALCIUM SPEC-SCNC: 9 MG/DL (ref 8.6–10.5)
CHLORIDE SERPL-SCNC: 96 MMOL/L (ref 98–107)
CHROMATIN AB SERPL-ACNC: 18 IU/ML (ref 0–14)
CO2 SERPL-SCNC: 20 MMOL/L (ref 22–29)
COLOR FLD: ABNORMAL
CREAT SERPL-MCNC: 0.76 MG/DL (ref 0.57–1)
CRYSTALS FLD MICRO: NORMAL
D DIMER PPP FEU-MCNC: 2.58 MCGFEU/ML (ref 0–0.49)
DEPRECATED RDW RBC AUTO: 44.6 FL (ref 37–54)
EGFRCR SERPLBLD CKD-EPI 2021: 87.6 ML/MIN/1.73
ERYTHROCYTE [DISTWIDTH] IN BLOOD BY AUTOMATED COUNT: 13.8 % (ref 12.3–15.4)
GLOBULIN UR ELPH-MCNC: 3.1 GM/DL
GLUCOSE SERPL-MCNC: 106 MG/DL (ref 65–99)
HAV IGM SERPL QL IA: NORMAL
HBV CORE IGM SERPL QL IA: NORMAL
HBV SURFACE AG SERPL QL IA: NORMAL
HCT VFR BLD AUTO: 35.1 % (ref 34–46.6)
HCV AB SER DONR QL: NORMAL
HGB BLD-MCNC: 12 G/DL (ref 12–15.9)
LYMPHOCYTES NFR FLD MANUAL: 6 %
MCH RBC QN AUTO: 30.5 PG (ref 26.6–33)
MCHC RBC AUTO-ENTMCNC: 34.2 G/DL (ref 31.5–35.7)
MCV RBC AUTO: 89.3 FL (ref 79–97)
METHOD: ABNORMAL
MONOCYTES NFR FLD: 9 %
NEUTROPHILS NFR FLD MANUAL: 85 %
NUC CELL # FLD: ABNORMAL /MM3
PLATELET # BLD AUTO: 166 10*3/MM3 (ref 140–450)
PMV BLD AUTO: 10.2 FL (ref 6–12)
POTASSIUM SERPL-SCNC: 3.4 MMOL/L (ref 3.5–5.2)
PROCALCITONIN SERPL-MCNC: 0.64 NG/ML (ref 0–0.25)
PROT SERPL-MCNC: 6.1 G/DL (ref 6–8.5)
RBC # BLD AUTO: 3.93 10*6/MM3 (ref 3.77–5.28)
RBC # FLD AUTO: ABNORMAL /MM3
SODIUM SERPL-SCNC: 132 MMOL/L (ref 136–145)
URATE SERPL-MCNC: 5.4 MG/DL (ref 2.4–5.7)
WBC NRBC COR # BLD: 7.75 10*3/MM3 (ref 3.4–10.8)

## 2022-06-28 PROCEDURE — 86200 CCP ANTIBODY: CPT | Performed by: STUDENT IN AN ORGANIZED HEALTH CARE EDUCATION/TRAINING PROGRAM

## 2022-06-28 PROCEDURE — 87015 SPECIMEN INFECT AGNT CONCNTJ: CPT | Performed by: INTERNAL MEDICINE

## 2022-06-28 PROCEDURE — 99214 OFFICE O/P EST MOD 30 MIN: CPT | Performed by: INTERNAL MEDICINE

## 2022-06-28 PROCEDURE — 86225 DNA ANTIBODY NATIVE: CPT | Performed by: STUDENT IN AN ORGANIZED HEALTH CARE EDUCATION/TRAINING PROGRAM

## 2022-06-28 PROCEDURE — 97162 PT EVAL MOD COMPLEX 30 MIN: CPT

## 2022-06-28 PROCEDURE — 99205 OFFICE O/P NEW HI 60 MIN: CPT | Performed by: INTERNAL MEDICINE

## 2022-06-28 PROCEDURE — 25010000002 ENOXAPARIN PER 10 MG: Performed by: NURSE PRACTITIONER

## 2022-06-28 PROCEDURE — 87147 CULTURE TYPE IMMUNOLOGIC: CPT | Performed by: INTERNAL MEDICINE

## 2022-06-28 PROCEDURE — 80074 ACUTE HEPATITIS PANEL: CPT | Performed by: STUDENT IN AN ORGANIZED HEALTH CARE EDUCATION/TRAINING PROGRAM

## 2022-06-28 PROCEDURE — 87186 SC STD MICRODIL/AGAR DIL: CPT | Performed by: INTERNAL MEDICINE

## 2022-06-28 PROCEDURE — 80053 COMPREHEN METABOLIC PANEL: CPT | Performed by: STUDENT IN AN ORGANIZED HEALTH CARE EDUCATION/TRAINING PROGRAM

## 2022-06-28 PROCEDURE — 86038 ANTINUCLEAR ANTIBODIES: CPT | Performed by: STUDENT IN AN ORGANIZED HEALTH CARE EDUCATION/TRAINING PROGRAM

## 2022-06-28 PROCEDURE — 97112 NEUROMUSCULAR REEDUCATION: CPT

## 2022-06-28 PROCEDURE — 85027 COMPLETE CBC AUTOMATED: CPT | Performed by: NURSE PRACTITIONER

## 2022-06-28 PROCEDURE — 25010000002 DIGOXIN PER 500 MCG: Performed by: INTERNAL MEDICINE

## 2022-06-28 PROCEDURE — 97530 THERAPEUTIC ACTIVITIES: CPT

## 2022-06-28 PROCEDURE — 87070 CULTURE OTHR SPECIMN AEROBIC: CPT | Performed by: INTERNAL MEDICINE

## 2022-06-28 PROCEDURE — 84550 ASSAY OF BLOOD/URIC ACID: CPT | Performed by: STUDENT IN AN ORGANIZED HEALTH CARE EDUCATION/TRAINING PROGRAM

## 2022-06-28 PROCEDURE — 86431 RHEUMATOID FACTOR QUANT: CPT | Performed by: STUDENT IN AN ORGANIZED HEALTH CARE EDUCATION/TRAINING PROGRAM

## 2022-06-28 PROCEDURE — 25010000002 CEFAZOLIN IN DEXTROSE 2-4 GM/100ML-% SOLUTION: Performed by: STUDENT IN AN ORGANIZED HEALTH CARE EDUCATION/TRAINING PROGRAM

## 2022-06-28 PROCEDURE — 97165 OT EVAL LOW COMPLEX 30 MIN: CPT

## 2022-06-28 PROCEDURE — 87205 SMEAR GRAM STAIN: CPT | Performed by: INTERNAL MEDICINE

## 2022-06-28 PROCEDURE — G0378 HOSPITAL OBSERVATION PER HR: HCPCS

## 2022-06-28 PROCEDURE — 85379 FIBRIN DEGRADATION QUANT: CPT | Performed by: NURSE PRACTITIONER

## 2022-06-28 PROCEDURE — 87040 BLOOD CULTURE FOR BACTERIA: CPT | Performed by: STUDENT IN AN ORGANIZED HEALTH CARE EDUCATION/TRAINING PROGRAM

## 2022-06-28 PROCEDURE — 0 LIDOCAINE 1 % SOLUTION: Performed by: RADIOLOGY

## 2022-06-28 PROCEDURE — 86235 NUCLEAR ANTIGEN ANTIBODY: CPT | Performed by: STUDENT IN AN ORGANIZED HEALTH CARE EDUCATION/TRAINING PROGRAM

## 2022-06-28 PROCEDURE — 63710000001 PREDNISONE PER 5 MG: Performed by: INTERNAL MEDICINE

## 2022-06-28 PROCEDURE — 84145 PROCALCITONIN (PCT): CPT | Performed by: STUDENT IN AN ORGANIZED HEALTH CARE EDUCATION/TRAINING PROGRAM

## 2022-06-28 PROCEDURE — 86060 ANTISTREPTOLYSIN O TITER: CPT | Performed by: STUDENT IN AN ORGANIZED HEALTH CARE EDUCATION/TRAINING PROGRAM

## 2022-06-28 PROCEDURE — 63710000001 PREDNISONE PER 1 MG: Performed by: INTERNAL MEDICINE

## 2022-06-28 PROCEDURE — 89060 EXAM SYNOVIAL FLUID CRYSTALS: CPT | Performed by: INTERNAL MEDICINE

## 2022-06-28 PROCEDURE — 77002 NEEDLE LOCALIZATION BY XRAY: CPT

## 2022-06-28 PROCEDURE — 0S9G3ZX DRAINAGE OF LEFT ANKLE JOINT, PERCUTANEOUS APPROACH, DIAGNOSTIC: ICD-10-PCS | Performed by: RADIOLOGY

## 2022-06-28 PROCEDURE — 89051 BODY FLUID CELL COUNT: CPT | Performed by: INTERNAL MEDICINE

## 2022-06-28 RX ORDER — LIDOCAINE 50 MG/G
1 PATCH TOPICAL
Status: DISCONTINUED | OUTPATIENT
Start: 2022-06-28 | End: 2022-07-13 | Stop reason: HOSPADM

## 2022-06-28 RX ORDER — DIGOXIN 0.25 MG/ML
250 INJECTION INTRAMUSCULAR; INTRAVENOUS ONCE
Status: COMPLETED | OUTPATIENT
Start: 2022-06-28 | End: 2022-06-28

## 2022-06-28 RX ORDER — POTASSIUM CHLORIDE 1.5 G/1.77G
20 POWDER, FOR SOLUTION ORAL ONCE
Status: COMPLETED | OUTPATIENT
Start: 2022-06-28 | End: 2022-06-28

## 2022-06-28 RX ORDER — EXEMESTANE 25 MG/1
25 TABLET ORAL DAILY
Status: DISCONTINUED | OUTPATIENT
Start: 2022-06-28 | End: 2022-07-13 | Stop reason: HOSPADM

## 2022-06-28 RX ORDER — FAMOTIDINE 20 MG/1
20 TABLET, FILM COATED ORAL
Status: DISCONTINUED | OUTPATIENT
Start: 2022-06-28 | End: 2022-07-13 | Stop reason: HOSPADM

## 2022-06-28 RX ORDER — LIDOCAINE HYDROCHLORIDE 10 MG/ML
10 INJECTION, SOLUTION INFILTRATION; PERINEURAL ONCE
Status: COMPLETED | OUTPATIENT
Start: 2022-06-28 | End: 2022-06-28

## 2022-06-28 RX ORDER — TRAZODONE HYDROCHLORIDE 50 MG/1
50 TABLET ORAL NIGHTLY PRN
Status: DISCONTINUED | OUTPATIENT
Start: 2022-06-28 | End: 2022-07-13 | Stop reason: HOSPADM

## 2022-06-28 RX ORDER — HYDROCODONE BITARTRATE AND ACETAMINOPHEN 5; 325 MG/1; MG/1
1 TABLET ORAL EVERY 4 HOURS PRN
Status: DISCONTINUED | OUTPATIENT
Start: 2022-06-28 | End: 2022-07-01 | Stop reason: DRUGHIGH

## 2022-06-28 RX ADMIN — METOPROLOL TARTRATE 25 MG: 25 TABLET, FILM COATED ORAL at 09:44

## 2022-06-28 RX ADMIN — HYDROCODONE BITARTRATE AND ACETAMINOPHEN 1 TABLET: 5; 325 TABLET ORAL at 17:47

## 2022-06-28 RX ADMIN — ENOXAPARIN SODIUM 70 MG: 100 INJECTION SUBCUTANEOUS at 12:06

## 2022-06-28 RX ADMIN — CLOBETASOL PROPIONATE 1 APPLICATION: 0.5 CREAM TOPICAL at 22:47

## 2022-06-28 RX ADMIN — HYDROCODONE BITARTRATE AND ACETAMINOPHEN 1 TABLET: 5; 325 TABLET ORAL at 00:33

## 2022-06-28 RX ADMIN — HYDROXYZINE PAMOATE 25 MG: 25 CAPSULE ORAL at 09:44

## 2022-06-28 RX ADMIN — LIDOCAINE HYDROCHLORIDE 1 ML: 10 INJECTION, SOLUTION INFILTRATION; PERINEURAL at 13:25

## 2022-06-28 RX ADMIN — CLOBETASOL PROPIONATE 1 APPLICATION: 0.5 CREAM TOPICAL at 09:44

## 2022-06-28 RX ADMIN — HYDROCODONE BITARTRATE AND ACETAMINOPHEN 1 TABLET: 5; 325 TABLET ORAL at 06:29

## 2022-06-28 RX ADMIN — HYDROXYZINE PAMOATE 25 MG: 25 CAPSULE ORAL at 21:48

## 2022-06-28 RX ADMIN — HYDROXYZINE PAMOATE 25 MG: 25 CAPSULE ORAL at 17:47

## 2022-06-28 RX ADMIN — HYDROCODONE BITARTRATE AND ACETAMINOPHEN 1 TABLET: 5; 325 TABLET ORAL at 12:24

## 2022-06-28 RX ADMIN — Medication 10 ML: at 09:45

## 2022-06-28 RX ADMIN — METOPROLOL TARTRATE 25 MG: 25 TABLET, FILM COATED ORAL at 17:47

## 2022-06-28 RX ADMIN — EXEMESTANE 25 MG: 25 TABLET, FILM COATED ORAL at 22:49

## 2022-06-28 RX ADMIN — ENOXAPARIN SODIUM 70 MG: 100 INJECTION SUBCUTANEOUS at 00:31

## 2022-06-28 RX ADMIN — Medication 10 ML: at 21:49

## 2022-06-28 RX ADMIN — CEFAZOLIN SODIUM 2 G: 2 INJECTION, SOLUTION INTRAVENOUS at 00:33

## 2022-06-28 RX ADMIN — POTASSIUM CHLORIDE 20 MEQ: 1.5 POWDER, FOR SOLUTION ORAL at 09:44

## 2022-06-28 RX ADMIN — METOPROLOL TARTRATE 25 MG: 25 TABLET, FILM COATED ORAL at 04:18

## 2022-06-28 RX ADMIN — FAMOTIDINE 20 MG: 20 TABLET ORAL at 17:47

## 2022-06-28 RX ADMIN — PREDNISONE 30 MG: 20 TABLET ORAL at 17:47

## 2022-06-28 RX ADMIN — LIDOCAINE 1 PATCH: 50 PATCH TOPICAL at 21:48

## 2022-06-28 RX ADMIN — DIGOXIN 250 MCG: 0.25 INJECTION INTRAMUSCULAR; INTRAVENOUS at 12:06

## 2022-06-29 ENCOUNTER — APPOINTMENT (OUTPATIENT)
Dept: MRI IMAGING | Facility: HOSPITAL | Age: 62
End: 2022-06-29

## 2022-06-29 PROBLEM — I48.91 NEW ONSET ATRIAL FIBRILLATION: Status: ACTIVE | Noted: 2022-06-29

## 2022-06-29 PROBLEM — M00.272: Status: ACTIVE | Noted: 2022-06-27

## 2022-06-29 LAB
ALBUMIN SERPL-MCNC: 2.9 G/DL (ref 3.5–5.2)
ALBUMIN/GLOB SERPL: 0.8 G/DL
ALP SERPL-CCNC: 151 U/L (ref 39–117)
ALT SERPL W P-5'-P-CCNC: 51 U/L (ref 1–33)
ANA SER QL: NEGATIVE
ANION GAP SERPL CALCULATED.3IONS-SCNC: 10.4 MMOL/L (ref 5–15)
ASO AB SERPL-ACNC: 22.7 IU/ML (ref 0–200)
AST SERPL-CCNC: 49 U/L (ref 1–32)
BILIRUB SERPL-MCNC: 0.4 MG/DL (ref 0–1.2)
BUN SERPL-MCNC: 17 MG/DL (ref 8–23)
BUN/CREAT SERPL: 25 (ref 7–25)
CALCIUM SPEC-SCNC: 9.2 MG/DL (ref 8.6–10.5)
CHLORIDE SERPL-SCNC: 101 MMOL/L (ref 98–107)
CO2 SERPL-SCNC: 23.6 MMOL/L (ref 22–29)
CREAT SERPL-MCNC: 0.68 MG/DL (ref 0.57–1)
DSDNA AB SER-ACNC: 1 IU/ML (ref 0–9)
EGFRCR SERPLBLD CKD-EPI 2021: 97.4 ML/MIN/1.73
ENA SM AB SER-ACNC: <0.2 AI (ref 0–0.9)
ENA SS-A AB SER-ACNC: <0.2 AI (ref 0–0.9)
ENA SS-B AB SER-ACNC: <0.2 AI (ref 0–0.9)
GLOBULIN UR ELPH-MCNC: 3.5 GM/DL
GLUCOSE SERPL-MCNC: 149 MG/DL (ref 65–99)
POTASSIUM SERPL-SCNC: 4.3 MMOL/L (ref 3.5–5.2)
PROT SERPL-MCNC: 6.4 G/DL (ref 6–8.5)
SODIUM SERPL-SCNC: 135 MMOL/L (ref 136–145)

## 2022-06-29 PROCEDURE — 63710000001 PREDNISONE PER 5 MG: Performed by: INTERNAL MEDICINE

## 2022-06-29 PROCEDURE — 99232 SBSQ HOSP IP/OBS MODERATE 35: CPT | Performed by: INTERNAL MEDICINE

## 2022-06-29 PROCEDURE — 99222 1ST HOSP IP/OBS MODERATE 55: CPT | Performed by: ORTHOPAEDIC SURGERY

## 2022-06-29 PROCEDURE — 80053 COMPREHEN METABOLIC PANEL: CPT | Performed by: INTERNAL MEDICINE

## 2022-06-29 PROCEDURE — 25010000002 ENOXAPARIN PER 10 MG: Performed by: NURSE PRACTITIONER

## 2022-06-29 PROCEDURE — 25010000002 CEFTRIAXONE PER 250 MG: Performed by: INTERNAL MEDICINE

## 2022-06-29 PROCEDURE — 73223 MRI JOINT UPR EXTR W/O&W/DYE: CPT

## 2022-06-29 PROCEDURE — 0 GADOBENATE DIMEGLUMINE 529 MG/ML SOLUTION: Performed by: STUDENT IN AN ORGANIZED HEALTH CARE EDUCATION/TRAINING PROGRAM

## 2022-06-29 PROCEDURE — 99233 SBSQ HOSP IP/OBS HIGH 50: CPT | Performed by: INTERNAL MEDICINE

## 2022-06-29 PROCEDURE — A9577 INJ MULTIHANCE: HCPCS | Performed by: STUDENT IN AN ORGANIZED HEALTH CARE EDUCATION/TRAINING PROGRAM

## 2022-06-29 PROCEDURE — 63710000001 PREDNISONE PER 1 MG: Performed by: INTERNAL MEDICINE

## 2022-06-29 PROCEDURE — 25010000002 DIGOXIN PER 500 MCG: Performed by: NURSE PRACTITIONER

## 2022-06-29 PROCEDURE — 25010000002 MAGNESIUM SULFATE 2 GM/50ML SOLUTION: Performed by: NURSE PRACTITIONER

## 2022-06-29 PROCEDURE — 99233 SBSQ HOSP IP/OBS HIGH 50: CPT | Performed by: NURSE PRACTITIONER

## 2022-06-29 RX ORDER — DIGOXIN 125 MCG
125 TABLET ORAL
Status: DISCONTINUED | OUTPATIENT
Start: 2022-06-30 | End: 2022-07-02

## 2022-06-29 RX ORDER — MAGNESIUM SULFATE HEPTAHYDRATE 40 MG/ML
2 INJECTION, SOLUTION INTRAVENOUS ONCE
Status: COMPLETED | OUTPATIENT
Start: 2022-06-29 | End: 2022-06-29

## 2022-06-29 RX ORDER — DIGOXIN 0.25 MG/ML
250 INJECTION INTRAMUSCULAR; INTRAVENOUS ONCE
Status: COMPLETED | OUTPATIENT
Start: 2022-06-29 | End: 2022-06-29

## 2022-06-29 RX ADMIN — FAMOTIDINE 20 MG: 20 TABLET ORAL at 21:05

## 2022-06-29 RX ADMIN — LIDOCAINE 1 PATCH: 50 PATCH TOPICAL at 21:04

## 2022-06-29 RX ADMIN — GADOBENATE DIMEGLUMINE 15 ML: 529 INJECTION, SOLUTION INTRAVENOUS at 19:06

## 2022-06-29 RX ADMIN — METOPROLOL TARTRATE 25 MG: 25 TABLET, FILM COATED ORAL at 21:05

## 2022-06-29 RX ADMIN — Medication 10 ML: at 21:06

## 2022-06-29 RX ADMIN — HYDROCODONE BITARTRATE AND ACETAMINOPHEN 1 TABLET: 5; 325 TABLET ORAL at 06:45

## 2022-06-29 RX ADMIN — HYDROXYZINE PAMOATE 25 MG: 25 CAPSULE ORAL at 16:01

## 2022-06-29 RX ADMIN — CLOBETASOL PROPIONATE 1 APPLICATION: 0.5 CREAM TOPICAL at 21:05

## 2022-06-29 RX ADMIN — HYDROCODONE BITARTRATE AND ACETAMINOPHEN 1 TABLET: 5; 325 TABLET ORAL at 21:16

## 2022-06-29 RX ADMIN — METOPROLOL TARTRATE 25 MG: 25 TABLET, FILM COATED ORAL at 04:09

## 2022-06-29 RX ADMIN — HYDROCODONE BITARTRATE AND ACETAMINOPHEN 1 TABLET: 5; 325 TABLET ORAL at 11:16

## 2022-06-29 RX ADMIN — METOPROLOL TARTRATE 25 MG: 25 TABLET, FILM COATED ORAL at 08:53

## 2022-06-29 RX ADMIN — FAMOTIDINE 20 MG: 20 TABLET ORAL at 06:43

## 2022-06-29 RX ADMIN — CLOBETASOL PROPIONATE 1 APPLICATION: 0.5 CREAM TOPICAL at 08:53

## 2022-06-29 RX ADMIN — DIGOXIN 250 MCG: 0.25 INJECTION INTRAMUSCULAR; INTRAVENOUS at 12:36

## 2022-06-29 RX ADMIN — HYDROCODONE BITARTRATE AND ACETAMINOPHEN 1 TABLET: 5; 325 TABLET ORAL at 16:01

## 2022-06-29 RX ADMIN — EXEMESTANE 25 MG: 25 TABLET, FILM COATED ORAL at 08:53

## 2022-06-29 RX ADMIN — ENOXAPARIN SODIUM 70 MG: 100 INJECTION SUBCUTANEOUS at 12:37

## 2022-06-29 RX ADMIN — CEFTRIAXONE SODIUM 2 G: 2 INJECTION, POWDER, FOR SOLUTION INTRAMUSCULAR; INTRAVENOUS at 11:16

## 2022-06-29 RX ADMIN — Medication 10 ML: at 08:55

## 2022-06-29 RX ADMIN — HYDROXYZINE PAMOATE 25 MG: 25 CAPSULE ORAL at 21:04

## 2022-06-29 RX ADMIN — MAGNESIUM SULFATE HEPTAHYDRATE 2 G: 2 INJECTION, SOLUTION INTRAVENOUS at 12:36

## 2022-06-29 RX ADMIN — ENOXAPARIN SODIUM 70 MG: 100 INJECTION SUBCUTANEOUS at 01:42

## 2022-06-29 RX ADMIN — HYDROXYZINE PAMOATE 25 MG: 25 CAPSULE ORAL at 08:53

## 2022-06-29 RX ADMIN — PREDNISONE 30 MG: 20 TABLET ORAL at 08:53

## 2022-06-30 ENCOUNTER — APPOINTMENT (OUTPATIENT)
Dept: GENERAL RADIOLOGY | Facility: HOSPITAL | Age: 62
End: 2022-06-30

## 2022-06-30 ENCOUNTER — TELEPHONE (OUTPATIENT)
Dept: ORTHOPEDIC SURGERY | Facility: CLINIC | Age: 62
End: 2022-06-30

## 2022-06-30 ENCOUNTER — ANESTHESIA (OUTPATIENT)
Dept: PERIOP | Facility: HOSPITAL | Age: 62
End: 2022-06-30

## 2022-06-30 ENCOUNTER — ANESTHESIA EVENT (OUTPATIENT)
Dept: PERIOP | Facility: HOSPITAL | Age: 62
End: 2022-06-30

## 2022-06-30 LAB
ALBUMIN SERPL-MCNC: 2.9 G/DL (ref 3.5–5.2)
ALBUMIN/GLOB SERPL: 0.9 G/DL
ALP SERPL-CCNC: 139 U/L (ref 39–117)
ALT SERPL W P-5'-P-CCNC: 102 U/L (ref 1–33)
ANION GAP SERPL CALCULATED.3IONS-SCNC: 11 MMOL/L (ref 5–15)
AST SERPL-CCNC: 105 U/L (ref 1–32)
BILIRUB SERPL-MCNC: 0.3 MG/DL (ref 0–1.2)
BUN SERPL-MCNC: 20 MG/DL (ref 8–23)
BUN/CREAT SERPL: 34.5 (ref 7–25)
CALCIUM SPEC-SCNC: 8.9 MG/DL (ref 8.6–10.5)
CCP IGA+IGG SERPL IA-ACNC: <1 UNITS (ref 0–19)
CHLORIDE SERPL-SCNC: 101 MMOL/L (ref 98–107)
CO2 SERPL-SCNC: 25 MMOL/L (ref 22–29)
CREAT SERPL-MCNC: 0.58 MG/DL (ref 0.57–1)
DEPRECATED RDW RBC AUTO: 42.9 FL (ref 37–54)
EGFRCR SERPLBLD CKD-EPI 2021: 101.2 ML/MIN/1.73
ERYTHROCYTE [DISTWIDTH] IN BLOOD BY AUTOMATED COUNT: 13.8 % (ref 12.3–15.4)
GLOBULIN UR ELPH-MCNC: 3.2 GM/DL
GLUCOSE SERPL-MCNC: 121 MG/DL (ref 65–99)
HCT VFR BLD AUTO: 36.1 % (ref 34–46.6)
HGB BLD-MCNC: 12.8 G/DL (ref 12–15.9)
MCH RBC QN AUTO: 31.3 PG (ref 26.6–33)
MCHC RBC AUTO-ENTMCNC: 35.5 G/DL (ref 31.5–35.7)
MCV RBC AUTO: 88.3 FL (ref 79–97)
PLATELET # BLD AUTO: 266 10*3/MM3 (ref 140–450)
PMV BLD AUTO: 9.5 FL (ref 6–12)
POTASSIUM SERPL-SCNC: 4.1 MMOL/L (ref 3.5–5.2)
PROT SERPL-MCNC: 6.1 G/DL (ref 6–8.5)
RBC # BLD AUTO: 4.09 10*6/MM3 (ref 3.77–5.28)
SARS-COV-2 RNA RESP QL NAA+PROBE: NOT DETECTED
SODIUM SERPL-SCNC: 137 MMOL/L (ref 136–145)
WBC NRBC COR # BLD: 10.9 10*3/MM3 (ref 3.4–10.8)

## 2022-06-30 PROCEDURE — 25010000002 PHENYLEPHRINE 10 MG/ML SOLUTION: Performed by: ANESTHESIOLOGY

## 2022-06-30 PROCEDURE — 25010000002 FENTANYL CITRATE (PF) 50 MCG/ML SOLUTION: Performed by: ANESTHESIOLOGY

## 2022-06-30 PROCEDURE — 27610 EXPLORE/TREAT ANKLE JOINT: CPT | Performed by: ORTHOPAEDIC SURGERY

## 2022-06-30 PROCEDURE — 0 LIDOCAINE 1 % SOLUTION: Performed by: STUDENT IN AN ORGANIZED HEALTH CARE EDUCATION/TRAINING PROGRAM

## 2022-06-30 PROCEDURE — 77002 NEEDLE LOCALIZATION BY XRAY: CPT

## 2022-06-30 PROCEDURE — 25010000002 ENOXAPARIN PER 10 MG: Performed by: NURSE PRACTITIONER

## 2022-06-30 PROCEDURE — 0S9G0ZZ DRAINAGE OF LEFT ANKLE JOINT, OPEN APPROACH: ICD-10-PCS | Performed by: ORTHOPAEDIC SURGERY

## 2022-06-30 PROCEDURE — 87070 CULTURE OTHR SPECIMN AEROBIC: CPT | Performed by: ORTHOPAEDIC SURGERY

## 2022-06-30 PROCEDURE — 25010000002 MIDAZOLAM PER 1 MG: Performed by: ANESTHESIOLOGY

## 2022-06-30 PROCEDURE — 87205 SMEAR GRAM STAIN: CPT | Performed by: ORTHOPAEDIC SURGERY

## 2022-06-30 PROCEDURE — 25010000002 PROPOFOL 10 MG/ML EMULSION: Performed by: ANESTHESIOLOGY

## 2022-06-30 PROCEDURE — U0003 INFECTIOUS AGENT DETECTION BY NUCLEIC ACID (DNA OR RNA); SEVERE ACUTE RESPIRATORY SYNDROME CORONAVIRUS 2 (SARS-COV-2) (CORONAVIRUS DISEASE [COVID-19]), AMPLIFIED PROBE TECHNIQUE, MAKING USE OF HIGH THROUGHPUT TECHNOLOGIES AS DESCRIBED BY CMS-2020-01-R: HCPCS | Performed by: STUDENT IN AN ORGANIZED HEALTH CARE EDUCATION/TRAINING PROGRAM

## 2022-06-30 PROCEDURE — 87075 CULTR BACTERIA EXCEPT BLOOD: CPT | Performed by: ORTHOPAEDIC SURGERY

## 2022-06-30 PROCEDURE — 0R9K3ZX DRAINAGE OF LEFT SHOULDER JOINT, PERCUTANEOUS APPROACH, DIAGNOSTIC: ICD-10-PCS | Performed by: RADIOLOGY

## 2022-06-30 PROCEDURE — 76000 FLUOROSCOPY <1 HR PHYS/QHP: CPT

## 2022-06-30 PROCEDURE — 25010000002 ONDANSETRON PER 1 MG: Performed by: ANESTHESIOLOGY

## 2022-06-30 PROCEDURE — 25010000002 HYDROMORPHONE PER 4 MG: Performed by: ANESTHESIOLOGY

## 2022-06-30 PROCEDURE — 25010000002 CEFAZOLIN IN DEXTROSE 2-4 GM/100ML-% SOLUTION: Performed by: ORTHOPAEDIC SURGERY

## 2022-06-30 PROCEDURE — 99231 SBSQ HOSP IP/OBS SF/LOW 25: CPT | Performed by: INTERNAL MEDICINE

## 2022-06-30 PROCEDURE — 80053 COMPREHEN METABOLIC PANEL: CPT | Performed by: STUDENT IN AN ORGANIZED HEALTH CARE EDUCATION/TRAINING PROGRAM

## 2022-06-30 PROCEDURE — 85027 COMPLETE CBC AUTOMATED: CPT | Performed by: STUDENT IN AN ORGANIZED HEALTH CARE EDUCATION/TRAINING PROGRAM

## 2022-06-30 PROCEDURE — 25010000002 CEFTRIAXONE PER 250 MG: Performed by: ORTHOPAEDIC SURGERY

## 2022-06-30 PROCEDURE — 73600 X-RAY EXAM OF ANKLE: CPT | Performed by: ORTHOPAEDIC SURGERY

## 2022-06-30 PROCEDURE — 99233 SBSQ HOSP IP/OBS HIGH 50: CPT | Performed by: INTERNAL MEDICINE

## 2022-06-30 RX ORDER — FAMOTIDINE 10 MG/ML
20 INJECTION, SOLUTION INTRAVENOUS ONCE
Status: COMPLETED | OUTPATIENT
Start: 2022-06-30 | End: 2022-06-30

## 2022-06-30 RX ORDER — SODIUM CHLORIDE 0.9 % (FLUSH) 0.9 %
3-10 SYRINGE (ML) INJECTION AS NEEDED
Status: DISCONTINUED | OUTPATIENT
Start: 2022-06-30 | End: 2022-06-30 | Stop reason: HOSPADM

## 2022-06-30 RX ORDER — ONDANSETRON 2 MG/ML
INJECTION INTRAMUSCULAR; INTRAVENOUS AS NEEDED
Status: DISCONTINUED | OUTPATIENT
Start: 2022-06-30 | End: 2022-06-30 | Stop reason: SURG

## 2022-06-30 RX ORDER — FLUMAZENIL 0.1 MG/ML
0.2 INJECTION INTRAVENOUS AS NEEDED
Status: DISCONTINUED | OUTPATIENT
Start: 2022-06-30 | End: 2022-06-30 | Stop reason: HOSPADM

## 2022-06-30 RX ORDER — CEFAZOLIN SODIUM 2 G/100ML
2 INJECTION, SOLUTION INTRAVENOUS EVERY 8 HOURS
Status: COMPLETED | OUTPATIENT
Start: 2022-06-30 | End: 2022-07-01

## 2022-06-30 RX ORDER — OXYCODONE AND ACETAMINOPHEN 7.5; 325 MG/1; MG/1
1 TABLET ORAL EVERY 4 HOURS PRN
Status: DISCONTINUED | OUTPATIENT
Start: 2022-06-30 | End: 2022-06-30 | Stop reason: HOSPADM

## 2022-06-30 RX ORDER — SODIUM CHLORIDE, SODIUM LACTATE, POTASSIUM CHLORIDE, CALCIUM CHLORIDE 600; 310; 30; 20 MG/100ML; MG/100ML; MG/100ML; MG/100ML
9 INJECTION, SOLUTION INTRAVENOUS CONTINUOUS
Status: DISCONTINUED | OUTPATIENT
Start: 2022-06-30 | End: 2022-07-03

## 2022-06-30 RX ORDER — FENTANYL CITRATE 50 UG/ML
50 INJECTION, SOLUTION INTRAMUSCULAR; INTRAVENOUS
Status: DISCONTINUED | OUTPATIENT
Start: 2022-06-30 | End: 2022-06-30 | Stop reason: HOSPADM

## 2022-06-30 RX ORDER — IBUPROFEN 600 MG/1
600 TABLET ORAL ONCE AS NEEDED
Status: DISCONTINUED | OUTPATIENT
Start: 2022-06-30 | End: 2022-06-30 | Stop reason: HOSPADM

## 2022-06-30 RX ORDER — DIPHENHYDRAMINE HYDROCHLORIDE 50 MG/ML
12.5 INJECTION INTRAMUSCULAR; INTRAVENOUS
Status: DISCONTINUED | OUTPATIENT
Start: 2022-06-30 | End: 2022-06-30 | Stop reason: HOSPADM

## 2022-06-30 RX ORDER — EPHEDRINE SULFATE 50 MG/ML
5 INJECTION, SOLUTION INTRAVENOUS ONCE AS NEEDED
Status: DISCONTINUED | OUTPATIENT
Start: 2022-06-30 | End: 2022-06-30 | Stop reason: HOSPADM

## 2022-06-30 RX ORDER — SODIUM CHLORIDE 0.9 % (FLUSH) 0.9 %
3 SYRINGE (ML) INJECTION EVERY 12 HOURS SCHEDULED
Status: DISCONTINUED | OUTPATIENT
Start: 2022-06-30 | End: 2022-06-30 | Stop reason: HOSPADM

## 2022-06-30 RX ORDER — PROMETHAZINE HYDROCHLORIDE 25 MG/1
25 SUPPOSITORY RECTAL ONCE AS NEEDED
Status: DISCONTINUED | OUTPATIENT
Start: 2022-06-30 | End: 2022-06-30 | Stop reason: HOSPADM

## 2022-06-30 RX ORDER — LIDOCAINE HYDROCHLORIDE 10 MG/ML
10 INJECTION, SOLUTION INFILTRATION; PERINEURAL ONCE
Status: COMPLETED | OUTPATIENT
Start: 2022-06-30 | End: 2022-06-30

## 2022-06-30 RX ORDER — MAGNESIUM HYDROXIDE 1200 MG/15ML
LIQUID ORAL AS NEEDED
Status: DISCONTINUED | OUTPATIENT
Start: 2022-06-30 | End: 2022-06-30 | Stop reason: HOSPADM

## 2022-06-30 RX ORDER — PROMETHAZINE HYDROCHLORIDE 25 MG/1
25 TABLET ORAL ONCE AS NEEDED
Status: DISCONTINUED | OUTPATIENT
Start: 2022-06-30 | End: 2022-06-30 | Stop reason: HOSPADM

## 2022-06-30 RX ORDER — LABETALOL HYDROCHLORIDE 5 MG/ML
5 INJECTION, SOLUTION INTRAVENOUS
Status: DISCONTINUED | OUTPATIENT
Start: 2022-06-30 | End: 2022-06-30 | Stop reason: HOSPADM

## 2022-06-30 RX ORDER — LIDOCAINE HYDROCHLORIDE 20 MG/ML
INJECTION, SOLUTION INFILTRATION; PERINEURAL AS NEEDED
Status: DISCONTINUED | OUTPATIENT
Start: 2022-06-30 | End: 2022-06-30 | Stop reason: SURG

## 2022-06-30 RX ORDER — PHENYLEPHRINE HYDROCHLORIDE 10 MG/ML
INJECTION INTRAVENOUS AS NEEDED
Status: DISCONTINUED | OUTPATIENT
Start: 2022-06-30 | End: 2022-06-30 | Stop reason: SURG

## 2022-06-30 RX ORDER — CEFAZOLIN SODIUM 2 G/100ML
2 INJECTION, SOLUTION INTRAVENOUS ONCE
Status: COMPLETED | OUTPATIENT
Start: 2022-06-30 | End: 2022-06-30

## 2022-06-30 RX ORDER — LIDOCAINE HYDROCHLORIDE 10 MG/ML
0.5 INJECTION, SOLUTION EPIDURAL; INFILTRATION; INTRACAUDAL; PERINEURAL ONCE AS NEEDED
Status: DISCONTINUED | OUTPATIENT
Start: 2022-06-30 | End: 2022-06-30 | Stop reason: HOSPADM

## 2022-06-30 RX ORDER — MIDAZOLAM HYDROCHLORIDE 1 MG/ML
1 INJECTION INTRAMUSCULAR; INTRAVENOUS
Status: DISCONTINUED | OUTPATIENT
Start: 2022-06-30 | End: 2022-06-30 | Stop reason: HOSPADM

## 2022-06-30 RX ORDER — HYDRALAZINE HYDROCHLORIDE 20 MG/ML
5 INJECTION INTRAMUSCULAR; INTRAVENOUS
Status: DISCONTINUED | OUTPATIENT
Start: 2022-06-30 | End: 2022-06-30 | Stop reason: HOSPADM

## 2022-06-30 RX ORDER — SODIUM CHLORIDE, SODIUM LACTATE, POTASSIUM CHLORIDE, CALCIUM CHLORIDE 600; 310; 30; 20 MG/100ML; MG/100ML; MG/100ML; MG/100ML
100 INJECTION, SOLUTION INTRAVENOUS CONTINUOUS
Status: DISCONTINUED | OUTPATIENT
Start: 2022-06-30 | End: 2022-07-03

## 2022-06-30 RX ORDER — HYDROCODONE BITARTRATE AND ACETAMINOPHEN 7.5; 325 MG/1; MG/1
1 TABLET ORAL ONCE AS NEEDED
Status: COMPLETED | OUTPATIENT
Start: 2022-06-30 | End: 2022-06-30

## 2022-06-30 RX ORDER — DIPHENHYDRAMINE HCL 25 MG
25 CAPSULE ORAL
Status: DISCONTINUED | OUTPATIENT
Start: 2022-06-30 | End: 2022-06-30 | Stop reason: HOSPADM

## 2022-06-30 RX ORDER — IPRATROPIUM BROMIDE AND ALBUTEROL SULFATE 2.5; .5 MG/3ML; MG/3ML
3 SOLUTION RESPIRATORY (INHALATION) ONCE AS NEEDED
Status: COMPLETED | OUTPATIENT
Start: 2022-06-30 | End: 2022-06-30

## 2022-06-30 RX ORDER — FENTANYL CITRATE 50 UG/ML
INJECTION, SOLUTION INTRAMUSCULAR; INTRAVENOUS AS NEEDED
Status: DISCONTINUED | OUTPATIENT
Start: 2022-06-30 | End: 2022-06-30 | Stop reason: SURG

## 2022-06-30 RX ORDER — HYDROMORPHONE HYDROCHLORIDE 1 MG/ML
0.5 INJECTION, SOLUTION INTRAMUSCULAR; INTRAVENOUS; SUBCUTANEOUS
Status: DISCONTINUED | OUTPATIENT
Start: 2022-06-30 | End: 2022-06-30 | Stop reason: HOSPADM

## 2022-06-30 RX ORDER — TIZANIDINE 4 MG/1
4 TABLET ORAL EVERY 8 HOURS PRN
Status: DISCONTINUED | OUTPATIENT
Start: 2022-06-30 | End: 2022-07-01

## 2022-06-30 RX ORDER — NALOXONE HCL 0.4 MG/ML
0.2 VIAL (ML) INJECTION AS NEEDED
Status: DISCONTINUED | OUTPATIENT
Start: 2022-06-30 | End: 2022-06-30 | Stop reason: HOSPADM

## 2022-06-30 RX ORDER — PROPOFOL 10 MG/ML
VIAL (ML) INTRAVENOUS AS NEEDED
Status: DISCONTINUED | OUTPATIENT
Start: 2022-06-30 | End: 2022-06-30 | Stop reason: SURG

## 2022-06-30 RX ORDER — ONDANSETRON 2 MG/ML
4 INJECTION INTRAMUSCULAR; INTRAVENOUS ONCE AS NEEDED
Status: DISCONTINUED | OUTPATIENT
Start: 2022-06-30 | End: 2022-06-30 | Stop reason: HOSPADM

## 2022-06-30 RX ADMIN — FAMOTIDINE 20 MG: 20 TABLET ORAL at 17:02

## 2022-06-30 RX ADMIN — HYDROCODONE BITARTRATE AND ACETAMINOPHEN 1 TABLET: 5; 325 TABLET ORAL at 17:27

## 2022-06-30 RX ADMIN — LIDOCAINE HYDROCHLORIDE 2.5 ML: 10 INJECTION, SOLUTION INFILTRATION; PERINEURAL at 12:30

## 2022-06-30 RX ADMIN — PHENYLEPHRINE HYDROCHLORIDE 200 MCG: 10 INJECTION, SOLUTION INTRAVENOUS at 08:17

## 2022-06-30 RX ADMIN — HYDROCODONE BITARTRATE AND ACETAMINOPHEN 1 TABLET: 5; 325 TABLET ORAL at 05:43

## 2022-06-30 RX ADMIN — SODIUM CHLORIDE, POTASSIUM CHLORIDE, SODIUM LACTATE AND CALCIUM CHLORIDE 9 ML/HR: 600; 310; 30; 20 INJECTION, SOLUTION INTRAVENOUS at 07:59

## 2022-06-30 RX ADMIN — MIDAZOLAM 1 MG: 1 INJECTION INTRAMUSCULAR; INTRAVENOUS at 07:58

## 2022-06-30 RX ADMIN — DIGOXIN 125 MCG: 125 TABLET ORAL at 11:39

## 2022-06-30 RX ADMIN — CEFTRIAXONE SODIUM 2 G: 2 INJECTION, POWDER, FOR SOLUTION INTRAMUSCULAR; INTRAVENOUS at 11:38

## 2022-06-30 RX ADMIN — METOPROLOL TARTRATE 25 MG: 25 TABLET, FILM COATED ORAL at 11:42

## 2022-06-30 RX ADMIN — TIZANIDINE 4 MG: 4 TABLET ORAL at 18:14

## 2022-06-30 RX ADMIN — Medication 10 ML: at 11:42

## 2022-06-30 RX ADMIN — LIDOCAINE HYDROCHLORIDE 80 MG: 20 INJECTION, SOLUTION INFILTRATION; PERINEURAL at 08:15

## 2022-06-30 RX ADMIN — CLOBETASOL PROPIONATE 1 APPLICATION: 0.5 CREAM TOPICAL at 21:39

## 2022-06-30 RX ADMIN — ENOXAPARIN SODIUM 70 MG: 100 INJECTION SUBCUTANEOUS at 01:36

## 2022-06-30 RX ADMIN — SODIUM CHLORIDE, POTASSIUM CHLORIDE, SODIUM LACTATE AND CALCIUM CHLORIDE 100 ML/HR: 600; 310; 30; 20 INJECTION, SOLUTION INTRAVENOUS at 11:42

## 2022-06-30 RX ADMIN — FENTANYL CITRATE 50 MCG: 50 INJECTION INTRAMUSCULAR; INTRAVENOUS at 08:44

## 2022-06-30 RX ADMIN — FAMOTIDINE 20 MG: 10 INJECTION INTRAVENOUS at 07:58

## 2022-06-30 RX ADMIN — HYDROMORPHONE HYDROCHLORIDE 0.5 MG: 1 INJECTION, SOLUTION INTRAMUSCULAR; INTRAVENOUS; SUBCUTANEOUS at 10:20

## 2022-06-30 RX ADMIN — HYDROMORPHONE HYDROCHLORIDE 0.5 MG: 1 INJECTION, SOLUTION INTRAMUSCULAR; INTRAVENOUS; SUBCUTANEOUS at 09:50

## 2022-06-30 RX ADMIN — HYDROCODONE BITARTRATE AND ACETAMINOPHEN 1 TABLET: 5; 325 TABLET ORAL at 21:29

## 2022-06-30 RX ADMIN — HYDROCODONE BITARTRATE AND ACETAMINOPHEN 1 TABLET: 7.5; 325 TABLET ORAL at 10:14

## 2022-06-30 RX ADMIN — PROPOFOL 150 MG: 10 INJECTION, EMULSION INTRAVENOUS at 08:15

## 2022-06-30 RX ADMIN — METOPROLOL TARTRATE 25 MG: 25 TABLET, FILM COATED ORAL at 01:36

## 2022-06-30 RX ADMIN — ONDANSETRON 4 MG: 2 INJECTION INTRAMUSCULAR; INTRAVENOUS at 08:48

## 2022-06-30 RX ADMIN — EXEMESTANE 25 MG: 25 TABLET, FILM COATED ORAL at 13:21

## 2022-06-30 RX ADMIN — FENTANYL CITRATE 50 MCG: 50 INJECTION INTRAMUSCULAR; INTRAVENOUS at 10:00

## 2022-06-30 RX ADMIN — IPRATROPIUM BROMIDE AND ALBUTEROL SULFATE 3 ML: .5; 3 SOLUTION RESPIRATORY (INHALATION) at 09:42

## 2022-06-30 RX ADMIN — HYDROMORPHONE HYDROCHLORIDE 0.5 MG: 1 INJECTION, SOLUTION INTRAMUSCULAR; INTRAVENOUS; SUBCUTANEOUS at 10:05

## 2022-06-30 RX ADMIN — METOPROLOL TARTRATE 25 MG: 25 TABLET, FILM COATED ORAL at 17:02

## 2022-06-30 RX ADMIN — CLOBETASOL PROPIONATE 1 APPLICATION: 0.5 CREAM TOPICAL at 11:39

## 2022-06-30 RX ADMIN — FENTANYL CITRATE 50 MCG: 50 INJECTION INTRAMUSCULAR; INTRAVENOUS at 10:14

## 2022-06-30 RX ADMIN — FENTANYL CITRATE 50 MCG: 50 INJECTION INTRAMUSCULAR; INTRAVENOUS at 09:43

## 2022-06-30 RX ADMIN — CEFAZOLIN SODIUM 2 G: 2 INJECTION, SOLUTION INTRAVENOUS at 07:58

## 2022-06-30 RX ADMIN — FENTANYL CITRATE 50 MCG: 50 INJECTION INTRAMUSCULAR; INTRAVENOUS at 08:13

## 2022-06-30 RX ADMIN — PHENYLEPHRINE HYDROCHLORIDE 200 MCG: 10 INJECTION, SOLUTION INTRAVENOUS at 08:25

## 2022-06-30 RX ADMIN — LIDOCAINE 1 PATCH: 50 PATCH TOPICAL at 21:28

## 2022-06-30 RX ADMIN — HYDROXYZINE PAMOATE 25 MG: 25 CAPSULE ORAL at 21:28

## 2022-06-30 RX ADMIN — CEFAZOLIN SODIUM 2 G: 2 INJECTION, SOLUTION INTRAVENOUS at 17:02

## 2022-06-30 RX ADMIN — HYDROXYZINE PAMOATE 25 MG: 25 CAPSULE ORAL at 11:41

## 2022-06-30 NOTE — TELEPHONE ENCOUNTER
Dr. Tran  Please call Lavern with [employer] in regards to Mrs. Staffileno.    1.)  She would like to know if patient can have 70 Lovenox after the I&D of the shoulder.   2.)  The lab called and stated not enough fluid to do any of the test your order from the aspiration of the shoulder.      Please call Lavern [phone]

## 2022-06-30 NOTE — ANESTHESIA PROCEDURE NOTES
Airway  Urgency: elective    Date/Time: 6/30/2022 8:16 AM    General Information and Staff    Patient location during procedure: OR  Anesthesiologist: Sridhar Moar MD    Indications and Patient Condition    Preoxygenated: yes      Final Airway Details  Final airway type: supraglottic airway      Successful airway: classic  Size 4    Number of attempts at approach: 1

## 2022-06-30 NOTE — ANESTHESIA POSTPROCEDURE EVALUATION
"Patient: Marylu Georges    Procedure Summary     Date: 06/30/22 Room / Location: [facility] OR [address] [place] [address] MAIN OR    Anesthesia Start: 0805 Anesthesia Stop: 0935    Procedure: INCISION AND DRAINAGE left ankle (Left Ankle) Diagnosis:       Streptococcal arthritis of left ankle (HCC)      (Streptococcal arthritis of left ankle (HCC) [M00.272])    Surgeons: Kenneth Tran MD Provider: Sridhar Mora MD    Anesthesia Type: general ASA Status: 3          Anesthesia Type: general    Vitals  Vitals Value Taken Time   /102 06/30/22 1046   Temp 36.4 °C (97.5 °F) 06/30/22 0936   Pulse 113 06/30/22 1053   Resp 16 06/30/22 1045   SpO2 91 % 06/30/22 1053   Vitals shown include unvalidated device data.        Post Anesthesia Care and Evaluation    Patient location during evaluation: bedside  Patient participation: complete - patient participated  Level of consciousness: awake and alert  Pain management: adequate    Airway patency: patent  Anesthetic complications: No anesthetic complications    Cardiovascular status: acceptable  Respiratory status: acceptable  Hydration status: acceptable    Comments: /84 (BP Location: Right arm, Patient Position: Lying)   Pulse 84   Temp 36.5 °C (97.7 °F) (Oral)   Resp 18   Ht 167.6 cm (66\")   Wt 70.3 kg (155 lb)   LMP  (LMP Unknown)   SpO2 96%   BMI 25.02 kg/m²       "

## 2022-06-30 NOTE — ANESTHESIA PREPROCEDURE EVALUATION
Anesthesia Evaluation     Patient summary reviewed and Nursing notes reviewed   no history of anesthetic complications:  NPO Solid Status: > 8 hours  NPO Liquid Status: > 8 hours           Airway   Mallampati: II  Dental      Pulmonary - negative pulmonary ROS and normal exam   Cardiovascular - normal exam    (+) hypertension less than 2 medications, dysrhythmias Atrial Fib,       Neuro/Psych  (+) CVA, dizziness/light headedness, syncope,    GI/Hepatic/Renal/Endo    (+)   hepatitis,     Musculoskeletal     Abdominal    Substance History      OB/GYN          Other   arthritis,    history of cancer remission                    Anesthesia Plan    ASA 3     general     intravenous induction     Anesthetic plan, risks, benefits, and alternatives have been provided, discussed and informed consent has been obtained with: patient.        CODE STATUS:    Code Status (Patient has no pulse and is not breathing): CPR (Attempt to Resuscitate)  Medical Interventions (Patient has pulse or is breathing): Full Support

## 2022-07-01 ENCOUNTER — APPOINTMENT (OUTPATIENT)
Dept: MRI IMAGING | Facility: HOSPITAL | Age: 62
End: 2022-07-01

## 2022-07-01 ENCOUNTER — TELEPHONE (OUTPATIENT)
Dept: ONCOLOGY | Facility: CLINIC | Age: 62
End: 2022-07-01

## 2022-07-01 ENCOUNTER — APPOINTMENT (OUTPATIENT)
Dept: ULTRASOUND IMAGING | Facility: HOSPITAL | Age: 62
End: 2022-07-01

## 2022-07-01 LAB
ANION GAP SERPL CALCULATED.3IONS-SCNC: 10.3 MMOL/L (ref 5–15)
BACTERIA FLD CULT: ABNORMAL
BUN SERPL-MCNC: 17 MG/DL (ref 8–23)
BUN/CREAT SERPL: 26.6 (ref 7–25)
CALCIUM SPEC-SCNC: 8.7 MG/DL (ref 8.6–10.5)
CHLORIDE SERPL-SCNC: 98 MMOL/L (ref 98–107)
CO2 SERPL-SCNC: 25.7 MMOL/L (ref 22–29)
CREAT SERPL-MCNC: 0.64 MG/DL (ref 0.57–1)
DEPRECATED RDW RBC AUTO: 45.5 FL (ref 37–54)
EGFRCR SERPLBLD CKD-EPI 2021: 98.8 ML/MIN/1.73
ERYTHROCYTE [DISTWIDTH] IN BLOOD BY AUTOMATED COUNT: 13.7 % (ref 12.3–15.4)
GLUCOSE SERPL-MCNC: 98 MG/DL (ref 65–99)
GRAM STN SPEC: ABNORMAL
GRAM STN SPEC: ABNORMAL
HCT VFR BLD AUTO: 34.8 % (ref 34–46.6)
HGB BLD-MCNC: 11.5 G/DL (ref 12–15.9)
MCH RBC QN AUTO: 30.4 PG (ref 26.6–33)
MCHC RBC AUTO-ENTMCNC: 33 G/DL (ref 31.5–35.7)
MCV RBC AUTO: 92.1 FL (ref 79–97)
PLATELET # BLD AUTO: 299 10*3/MM3 (ref 140–450)
PMV BLD AUTO: 9.3 FL (ref 6–12)
POTASSIUM SERPL-SCNC: 4.3 MMOL/L (ref 3.5–5.2)
RBC # BLD AUTO: 3.78 10*6/MM3 (ref 3.77–5.28)
SODIUM SERPL-SCNC: 134 MMOL/L (ref 136–145)
WBC NRBC COR # BLD: 9.53 10*3/MM3 (ref 3.4–10.8)

## 2022-07-01 PROCEDURE — 72158 MRI LUMBAR SPINE W/O & W/DYE: CPT

## 2022-07-01 PROCEDURE — 0 GADOBENATE DIMEGLUMINE 529 MG/ML SOLUTION: Performed by: STUDENT IN AN ORGANIZED HEALTH CARE EDUCATION/TRAINING PROGRAM

## 2022-07-01 PROCEDURE — 25010000002 ENOXAPARIN PER 10 MG: Performed by: NURSE PRACTITIONER

## 2022-07-01 PROCEDURE — 99233 SBSQ HOSP IP/OBS HIGH 50: CPT | Performed by: INTERNAL MEDICINE

## 2022-07-01 PROCEDURE — 80048 BASIC METABOLIC PNL TOTAL CA: CPT | Performed by: STUDENT IN AN ORGANIZED HEALTH CARE EDUCATION/TRAINING PROGRAM

## 2022-07-01 PROCEDURE — 76705 ECHO EXAM OF ABDOMEN: CPT

## 2022-07-01 PROCEDURE — 25010000002 CEFAZOLIN IN DEXTROSE 2-4 GM/100ML-% SOLUTION: Performed by: ORTHOPAEDIC SURGERY

## 2022-07-01 PROCEDURE — 25010000002 CEFTRIAXONE PER 250 MG: Performed by: ORTHOPAEDIC SURGERY

## 2022-07-01 PROCEDURE — 99233 SBSQ HOSP IP/OBS HIGH 50: CPT | Performed by: NURSE PRACTITIONER

## 2022-07-01 PROCEDURE — 97535 SELF CARE MNGMENT TRAINING: CPT

## 2022-07-01 PROCEDURE — 97530 THERAPEUTIC ACTIVITIES: CPT

## 2022-07-01 PROCEDURE — 73220 MRI UPPR EXTREMITY W/O&W/DYE: CPT

## 2022-07-01 PROCEDURE — 97168 OT RE-EVAL EST PLAN CARE: CPT

## 2022-07-01 PROCEDURE — 99231 SBSQ HOSP IP/OBS SF/LOW 25: CPT | Performed by: INTERNAL MEDICINE

## 2022-07-01 PROCEDURE — A9577 INJ MULTIHANCE: HCPCS | Performed by: STUDENT IN AN ORGANIZED HEALTH CARE EDUCATION/TRAINING PROGRAM

## 2022-07-01 PROCEDURE — 85027 COMPLETE CBC AUTOMATED: CPT | Performed by: STUDENT IN AN ORGANIZED HEALTH CARE EDUCATION/TRAINING PROGRAM

## 2022-07-01 RX ORDER — HYDROCODONE BITARTRATE AND ACETAMINOPHEN 7.5; 325 MG/1; MG/1
1 TABLET ORAL EVERY 4 HOURS PRN
Status: COMPLETED | OUTPATIENT
Start: 2022-07-01 | End: 2022-07-01

## 2022-07-01 RX ORDER — METOPROLOL TARTRATE 50 MG/1
50 TABLET, FILM COATED ORAL EVERY 12 HOURS SCHEDULED
Status: DISCONTINUED | OUTPATIENT
Start: 2022-07-01 | End: 2022-07-07

## 2022-07-01 RX ORDER — HYDROCODONE BITARTRATE AND ACETAMINOPHEN 7.5; 325 MG/1; MG/1
1 TABLET ORAL ONCE AS NEEDED
Status: COMPLETED | OUTPATIENT
Start: 2022-07-01 | End: 2022-07-01

## 2022-07-01 RX ORDER — ENOXAPARIN SODIUM 100 MG/ML
1 INJECTION SUBCUTANEOUS EVERY 12 HOURS
Status: DISCONTINUED | OUTPATIENT
Start: 2022-07-01 | End: 2022-07-06

## 2022-07-01 RX ORDER — TIZANIDINE 4 MG/1
4 TABLET ORAL EVERY 6 HOURS PRN
Status: DISCONTINUED | OUTPATIENT
Start: 2022-07-01 | End: 2022-07-13 | Stop reason: HOSPADM

## 2022-07-01 RX ORDER — HYDROCODONE BITARTRATE AND ACETAMINOPHEN 7.5; 325 MG/1; MG/1
1 TABLET ORAL EVERY 4 HOURS PRN
Status: COMPLETED | OUTPATIENT
Start: 2022-07-01 | End: 2022-07-02

## 2022-07-01 RX ADMIN — HYDROCODONE BITARTRATE AND ACETAMINOPHEN 1 TABLET: 5; 325 TABLET ORAL at 06:41

## 2022-07-01 RX ADMIN — TIZANIDINE 4 MG: 4 TABLET ORAL at 11:59

## 2022-07-01 RX ADMIN — FAMOTIDINE 20 MG: 20 TABLET ORAL at 18:22

## 2022-07-01 RX ADMIN — GADOBENATE DIMEGLUMINE 15 ML: 529 INJECTION, SOLUTION INTRAVENOUS at 11:00

## 2022-07-01 RX ADMIN — TIZANIDINE 4 MG: 4 TABLET ORAL at 18:22

## 2022-07-01 RX ADMIN — HYDROCODONE BITARTRATE AND ACETAMINOPHEN 1 TABLET: 5; 325 TABLET ORAL at 10:05

## 2022-07-01 RX ADMIN — TIZANIDINE 4 MG: 4 TABLET ORAL at 02:35

## 2022-07-01 RX ADMIN — FAMOTIDINE 20 MG: 20 TABLET ORAL at 06:41

## 2022-07-01 RX ADMIN — HYDROCODONE BITARTRATE AND ACETAMINOPHEN 1 TABLET: 7.5; 325 TABLET ORAL at 14:27

## 2022-07-01 RX ADMIN — LIDOCAINE 1 PATCH: 50 PATCH TOPICAL at 22:06

## 2022-07-01 RX ADMIN — METOPROLOL TARTRATE 50 MG: 25 TABLET, FILM COATED ORAL at 09:39

## 2022-07-01 RX ADMIN — HYDROXYZINE PAMOATE 25 MG: 25 CAPSULE ORAL at 16:24

## 2022-07-01 RX ADMIN — HYDROXYZINE PAMOATE 25 MG: 25 CAPSULE ORAL at 20:24

## 2022-07-01 RX ADMIN — EXEMESTANE 25 MG: 25 TABLET, FILM COATED ORAL at 09:09

## 2022-07-01 RX ADMIN — METOPROLOL TARTRATE 50 MG: 25 TABLET, FILM COATED ORAL at 20:24

## 2022-07-01 RX ADMIN — DIGOXIN 125 MCG: 125 TABLET ORAL at 12:51

## 2022-07-01 RX ADMIN — CEFTRIAXONE SODIUM 2 G: 2 INJECTION, POWDER, FOR SOLUTION INTRAMUSCULAR; INTRAVENOUS at 12:51

## 2022-07-01 RX ADMIN — ENOXAPARIN SODIUM 70 MG: 100 INJECTION SUBCUTANEOUS at 22:06

## 2022-07-01 RX ADMIN — ENOXAPARIN SODIUM 70 MG: 100 INJECTION SUBCUTANEOUS at 12:51

## 2022-07-01 RX ADMIN — CLOBETASOL PROPIONATE 1 APPLICATION: 0.5 CREAM TOPICAL at 09:40

## 2022-07-01 RX ADMIN — HYDROCODONE BITARTRATE AND ACETAMINOPHEN 1 TABLET: 7.5; 325 TABLET ORAL at 18:22

## 2022-07-01 RX ADMIN — METOPROLOL TARTRATE 25 MG: 25 TABLET, FILM COATED ORAL at 01:01

## 2022-07-01 RX ADMIN — HYDROCODONE BITARTRATE AND ACETAMINOPHEN 1 TABLET: 5; 325 TABLET ORAL at 01:01

## 2022-07-01 RX ADMIN — CEFAZOLIN SODIUM 2 G: 2 INJECTION, SOLUTION INTRAVENOUS at 01:02

## 2022-07-01 RX ADMIN — Medication 10 ML: at 09:40

## 2022-07-01 RX ADMIN — HYDROXYZINE PAMOATE 25 MG: 25 CAPSULE ORAL at 09:39

## 2022-07-01 RX ADMIN — HYDROCODONE BITARTRATE AND ACETAMINOPHEN 1 TABLET: 7.5; 325 TABLET ORAL at 23:02

## 2022-07-01 RX ADMIN — Medication 10 ML: at 20:24

## 2022-07-01 RX ADMIN — CLOBETASOL PROPIONATE 1 APPLICATION: 0.5 CREAM TOPICAL at 20:24

## 2022-07-01 NOTE — THERAPY RE-EVALUATION
Patient Name: Marylu Georges  : 1960    MRN: [id number]                              Today's Date: 2022       Admit Date: 2022    Visit Dx:     ICD-10-CM ICD-9-CM   1. Streptococcal arthritis of left ankle (HCC)  M00.272 711.07     041.00   2. Atrial fibrillation, unspecified type (HCC)  I48.91 427.31   3. Elevated LFTs  R79.89 790.6   4. Elevated C-reactive protein (CRP)  R79.82 790.95   5. Elevated sed rate  R70.0 790.1   6. Edema of both upper extremities  R60.0 782.3     Patient Active Problem List   Diagnosis   • Malignant neoplasm of overlapping sites of left breast in female, estrogen receptor positive (HCC)   • Family history of breast cancer in female   • Syncope   • Malignant neoplasm of overlapping sites of both breasts in female, estrogen receptor positive (HCC)   • Hypertension   • Encounter for long-term (current) use of other medications   • Drug-induced hepatitis   • Atrial fibrillation (HCC)   • Transaminitis   • Lymphedema of upper extremity, bilateral   • Rash   • Inflammatory arthritis   • Streptococcal arthritis of left ankle (HCC)   • New onset atrial fibrillation (HCC)     Past Medical History:   Diagnosis Date   • Anemia in neoplastic disease    • Arthritis    • Breast cancer (HCC)     Left   • CVA (cerebral vascular accident) (HCC)    • Hip pain     RIGHT HIP... CYST   • History of fracture of leg    • History of radiation therapy     LAST TREATMENT     • Hypertension    • Limited joint range of motion (ROM)     RIGHT HIP   • Skin sore     OPEN SORE LEFT BREAST   • Syncope    • Vertigo      Past Surgical History:   Procedure Laterality Date   • AXILLARY LYMPH NODE BIOPSY/EXCISION Right     LYMPH NODE UNDER RIGHT ARM-MALIGNANT (DOUBLE MASTECTOMY)   • BREAST BIOPSY Left     MALIGNANT   • FRACTURE SURGERY      Leg   • HARDWARE REMOVAL     • MASTECTOMY W/ SENTINEL NODE BIOPSY Bilateral 2019    Procedure: BILATERLA MODIFIED RADICAL MASTECTOMY WITH BILATERAL  SENTINEL LYMPH NODE BIOPSY;  Surgeon: Joby Barron Jr., MD;  Location: [facility] MAIN OR;  Service: General   • TOTAL HIP ARTHROPLASTY Right 3/2/2020    Procedure: RIGHT TOTAL HIP ARTHROPLASTY NATALY NAVIGATION;  Surgeon: Luis M Leonard MD;  Location: [facility] MAIN OR;  Service: Orthopedics;  Laterality: Right;   • TOTAL HIP ARTHROPLASTY Left 7/20/2021    Procedure: Posterior LEFT TOTAL HIP ARTHROPLASTY NATALY NAVIGATION;  Surgeon: Luis M Leonard MD;  Location: [facility] MAIN OR;  Service: Orthopedics;  Laterality: Left;   • VENOUS ACCESS DEVICE (PORT) INSERTION Right 2/1/2019    Procedure: INSERTION VENOUS ACCESS DEVICE;  Surgeon: Joby Barron Jr., MD;  Location: [facility] OR [facility];  Service: General   • VENOUS ACCESS DEVICE (PORT) REMOVAL N/A 10/30/2019    Procedure: Mediport Removal;  Surgeon: Joby Barron Jr., MD;  Location: [facility] MAIN OR;  Service: General      General Information     Row Name 07/01/22 0935          OT Time and Intention    Document Type re-evaluation  -RB     Mode of Treatment individual therapy;occupational therapy  -RB     Row Name 07/01/22 0935          General Information    Patient Profile Reviewed yes  -RB     Prior Level of Function independent:;ADL's;transfer  -RB     Existing Precautions/Restrictions fall  -RB     Barriers to Rehab medically complex;previous functional deficit  -RB     Row Name 07/01/22 0935          Living Environment    People in Home spouse  -RB     Row Name 07/01/22 0935          Cognition    Orientation Status (Cognition) oriented x 3  -RB     Row Name 07/01/22 0935          Safety Issues, Functional Mobility    Safety Issues Affecting Function (Mobility) safety precautions follow-through/compliance;safety precaution awareness;awareness of need for assistance;insight into deficits/self-awareness;problem-solving;judgment;impulsivity  -RB     Impairments Affecting Function (Mobility) balance;coordination;endurance/activity tolerance;pain;strength;range of motion  (ROM)  -RB           User Key  (r) = Recorded By, (t) = Taken By, (c) = Cosigned By    Initials Name Provider Type    RB Viviane Mauricio OT Occupational Therapist                 Mobility/ADL's     Row Name 07/01/22 0936          Bed Mobility    Bed Mobility supine-sit;sit-supine  -RB     Supine-Sit [place] (Bed Mobility) minimum assist (75% patient effort);1 person to manage equipment;1 person assist;verbal cues;nonverbal cues (demo/gesture)  -RB     Sit-Supine [place] (Bed Mobility) minimum assist (75% patient effort);nonverbal cues (demo/gesture);verbal cues;1 person to manage equipment;1 person assist  -RB     Bed Mobility, Safety Issues decreased use of arms for pushing/pulling;decreased use of legs for bridging/pushing;impaired trunk control for bed mobility  -RB     Assistive Device (Bed Mobility) bed rails  -RB     Comment, (Bed Mobility) log rolling to decrease the stress on her back which causes cramps  -RB     Row Name 07/01/22 0936          Transfers    Transfers sit-stand transfer;stand-sit transfer  -RB     Comment, (Transfers) pt declined any further transfers past STS at the EOB. BSC placed next to the bed with pt declining a transfer.  -RB     Sit-Stand [place] (Transfers) minimum assist (75% patient effort);verbal cues;nonverbal cues (demo/gesture);2 person assist  -RB     Stand-Sit [place] (Transfers) minimum assist (75% patient effort);2 person assist;verbal cues;nonverbal cues (demo/gesture)  -RB     Row Name 07/01/22 0936          Sit-Stand Transfer    Assistive Device (Sit-Stand Transfers) walker, front-wheeled  -RB     Row Name 07/01/22 0936          Stand-Sit Transfer    Assistive Device (Stand-Sit Transfers) walker, front-wheeled  -RB     Row Name 07/01/22 0936          Functional Mobility    Functional Mobility- Ind. Level unable to perform  -RB     Row Name 07/01/22 0936          Activities of Daily Living    BADL Assessment/Intervention lower body  dressing;toileting  -     Row Name 07/01/22 0936          Lower Body Dressing Assessment/Training    [place] Level (Lower Body Dressing) lower body dressing skills;dependent (less than 25% patient effort)  -RB     Position (Lower Body Dressing) supine  -     Row Name 07/01/22 0936          Toileting Assessment/Training    [place] Level (Toileting) toileting skills;dependent (less than 25% patient effort)  -RB     Position (Toileting) supine;supported sitting;supported standing  -RB           User Key  (r) = Recorded By, (t) = Taken By, (c) = Cosigned By    Initials Name Provider Type    Viviane Shah OT Occupational Therapist               Obj/Interventions     Row Name 07/01/22 0938          Sensory Assessment (Somatosensory)    Sensory Assessment (Somatosensory) sensation intact  -[facility] Name 07/01/22 0938          Vision Assessment/Intervention    Visual Impairment/Limitations WFL  -[facility] Name 07/01/22 0938          Range of Motion Comprehensive    Comment, General Range of Motion Pt with fair AROM in the LUE - she reports not wanting to move the extremity but can. LUE very swollen, red and hard from infection.  -[facility] Name 07/01/22 0938          Strength Comprehensive (MMT)    Comment, General Manual Muscle Testing (MMT) Assessment LUE/LLE impaired from infection. Generalized weakness present throughout.  -     Row Name 07/01/22 0938          Motor Skills    Therapeutic Exercise --  OT provided AAROM to the LUE - she reported she can move it just fine but doesn't want to.  -[facility] Name 07/01/22 0938          Balance    Comment, Balance CGA/Min A x2 for sitting and standing balance at the EOB. Difficulty placing weight/weight shifting onto L side  -RB           User Key  (r) = Recorded By, (t) = Taken By, (c) = Cosigned By    Initials Name Provider Type    Viviane Shah OT Occupational Therapist               Goals/Plan     Row Name 07/01/22 0942          Bed  Mobility Goal 1 (OT)    Activity/Assistive Device (Bed Mobility Goal 1, OT) bed mobility activities, all  -RB     [place] Level/Cues Needed (Bed Mobility Goal 1, OT) supervision required  -RB     Time Frame (Bed Mobility Goal 1, OT) short term goal (STG);2 weeks  -RB     Progress/Outcomes (Bed Mobility Goal 1, OT) continuing progress toward goal  -RB     Row Name 07/01/22 0942          Transfer Goal 1 (OT)    Activity/Assistive Device (Transfer Goal 1, OT) transfers, all  -RB     [place] Level/Cues Needed (Transfer Goal 1, OT) supervision required  -RB     Time Frame (Transfer Goal 1, OT) short term goal (STG);2 weeks  -RB     Progress/Outcome (Transfer Goal 1, OT) continuing progress toward goal  -RB     Row Name 07/01/22 0942          Dressing Goal 1 (OT)    Activity/Device (Dressing Goal 1, OT) dressing skills, all  -RB     [place]/Cues Needed (Dressing Goal 1, OT) minimum assist (75% or more patient effort)  -RB     Time Frame (Dressing Goal 1, OT) short term goal (STG);2 weeks  -RB     Progress/Outcome (Dressing Goal 1, OT) goal revised this date  -RB     Row Name 07/01/22 0942          Toileting Goal 1 (OT)    Activity/Device (Toileting Goal 1, OT) toileting skills, all  -RB     [place] Level/Cues Needed (Toileting Goal 1, OT) minimum assist (75% or more patient effort)  -RB     Time Frame (Toileting Goal 1, OT) short term goal (STG);2 weeks  -RB     Progress/Outcome (Toileting Goal 1, OT) continuing progress toward goal  -RB     Row Name 07/01/22 0942          ROM Goal 1 (OT)    ROM Goal 1 (OT) Pt to complete AROM in LUE with Mod I x6 per day.  -RB     Time Frame (ROM Goal 1, OT) short term goal (STG);2 weeks  -RB     Progress/Outcome (ROM Goal 1, OT) goal revised this date  -RB     Row Name 07/01/22 0942          Therapy Assessment/Plan (OT)    Planned Therapy Interventions (OT) transfer/mobility retraining;strengthening exercise;ROM/therapeutic exercise;activity tolerance  training;adaptive equipment training;BADL retraining;functional balance retraining;occupation/activity based interventions;patient/caregiver education/training;edema control/reduction  -RB           User Key  (r) = Recorded By, (t) = Taken By, (c) = Cosigned By    Initials Name Provider Type    Viviane Shah OT Occupational Therapist               Clinical Impression     Row Name 07/01/22 0940          Pain Assessment    Pretreatment Pain Rating 9/10  -RB     Posttreatment Pain Rating 9/10  -RB     Pain Location - Side/Orientation Left  -RB     Pain Location generalized  -RB     Pain Location - back;extremity  -RB     Pain Intervention(s) Repositioned;Elevated  -RB     Row Name 07/01/22 0940          Plan of Care Review    Plan of Care Reviewed With patient  -RB     Progress no change  -RB     Row Name 07/01/22 0940          Therapy Assessment/Plan (OT)    Rehab Potential (OT) good, to achieve stated therapy goals  -RB     Criteria for Skilled Therapeutic Interventions Met (OT) yes;skilled treatment is necessary  -RB     Therapy Frequency (OT) 5 times/wk  -RB     Row Name 07/01/22 0940          Therapy Plan Review/Discharge Plan (OT)    Anticipated Discharge Disposition (OT) skilled nursing facility  -RB     Row Name 07/01/22 0940          Vital Signs    O2 Delivery Pre Treatment room air  -RB     O2 Delivery Intra Treatment room air  -RB     O2 Delivery Post Treatment room air  -RB     Pre Patient Position Supine  -RB     Intra Patient Position Standing  -RB     Post Patient Position Supine  -RB     Row Name 07/01/22 0940          Positioning and Restraints    Pre-Treatment Position in bed  -RB     Post Treatment Position bed  -RB     In Bed notified nsg;supine;call light within reach;encouraged to call for assist;exit alarm on;legs elevated;LUE elevated  -RB           User Key  (r) = Recorded By, (t) = Taken By, (c) = Cosigned By    Initials Name Provider Type    Viviane Shah OT Occupational  Therapist               Outcome Measures     Row Name 07/01/22 0943          How much help from another is currently needed...    Putting on and taking off regular lower body clothing? 1  -RB     Bathing (including washing, rinsing, and drying) 2  -RB     Toileting (which includes using toilet bed pan or urinal) 1  -RB     Putting on and taking off regular upper body clothing 2  -RB     Taking care of personal grooming (such as brushing teeth) 2  -RB     Eating meals 2  -RB     AM-PAC 6 Clicks Score (OT) 10  -RB     Row Name 07/01/22 0943          Modified [place] Scale    Modified Susan Scale 4 - Moderately severe disability.  Unable to walk without assistance, and unable to attend to own bodily needs without assistance.  -RB     Row Name 07/01/22 0943          Functional Assessment    Outcome Measure Options AM-PAC 6 Clicks Daily Activity (OT);Modified [place]  -RB           User Key  (r) = Recorded By, (t) = Taken By, (c) = Cosigned By    Initials Name Provider Type    Viviane Shah OT Occupational Therapist                Occupational Therapy Education                 Title: PT OT SLP Therapies (In Progress)     Topic: Occupational Therapy (Done)     Point: ADL training (Done)     Description:   Instruct learner(s) on proper safety adaptation and remediation techniques during self care or transfers.   Instruct in proper use of assistive devices.              Learning Progress Summary           Patient Acceptance, E, VU,NR by VS at 6/28/2022 1446    Comment: OT discussed the POC and the goals with the pt.                   Point: Home exercise program (Done)     Description:   Instruct learner(s) on appropriate technique for monitoring, assisting and/or progressing therapeutic exercises/activities.              Learning Progress Summary           Patient Acceptance, E, VU,NR by VS at 6/28/2022 1446    Comment: OT discussed the POC and the goals with the pt.                   Point: Precautions (Done)      Description:   Instruct learner(s) on prescribed precautions during self-care and functional transfers.              Learning Progress Summary           Patient Acceptance, E, VU,NR by VS at 6/28/2022 0486    Comment: OT discussed the POC and the goals with the pt.                               User Key     Initials Effective Dates Name Provider Type Discipline    VS 06/16/21 -  Trista Shaikh OTR Occupational Therapist OT              OT Recommendation and Plan  Planned Therapy Interventions (OT): transfer/mobility retraining, strengthening exercise, ROM/therapeutic exercise, activity tolerance training, adaptive equipment training, BADL retraining, functional balance retraining, occupation/activity based interventions, patient/caregiver education/training, edema control/reduction  Therapy Frequency (OT): 5 times/wk  Plan of Care Review  Plan of Care Reviewed With: patient  Progress: no change     Time Calculation:    Time Calculation- OT     Row Name 07/01/22 0934             Time Calculation- OT    OT Start Time 0829  -RB      OT Stop Time 0858  -RB      OT Time Calculation (min) 29 min  -RB      Total Timed Code Minutes- OT 15 minute(s)  -RB      OT Received On 07/01/22  -RB      OT - Next Appointment 07/04/22  -RB      OT Goal Re-Cert Due Date 07/15/22  -RB              Timed Charges    25258 - OT Self Care/Mgmt Minutes 15  -RB              Untimed Charges    OT Eval/Re-eval Minutes 14  -RB              Total Minutes    Timed Charges Total Minutes 15  -RB      Untimed Charges Total Minutes 14  -RB       Total Minutes 29  -RB            User Key  (r) = Recorded By, (t) = Taken By, (c) = Cosigned By    Initials Name Provider Type    RB Viviane Mauricio OT Occupational Therapist              Therapy Charges for Today     Code Description Service Date Service Provider Modifiers Qty    [id number] HC OT RE-EVAL 2 7/1/2022 Viviane Mauricio OT GO 1    [id number]  OT SELF CARE/MGMT/TRAIN EA 15 MIN 7/1/2022  Viviane Mauricio, OT GO 1               Viviane Mauricio, HUEY  7/1/2022

## 2022-07-01 NOTE — PROGRESS NOTES
ID note for cellulitis?  S: She is having ongoing left sided back pain which is spasm-like.  Still with left shoulder pain.  Had BM  Feels very mobile  No fevers or chills or night sweats  Tolerating antibiotics    O: Temp:  [97.5 °F (36.4 °C)-99.9 °F (37.7 °C)] 99.3 °F (37.4 °C)  Heart Rate:  [] 100  Resp:  [16-18] 17  BP: (107-148)/() 135/92  GENERAL: Sickly, nontoxic  HEENT: Oropharynx is clear. Hearing is grossly normal.   EYES: . No conjunctival injection. No lid lag.   HEART: tachy, irr 1-2+ LUE peripheral edema.    LUNGS: Clear to auscultation anteriorly with normal respiratory effort.   GI: Soft, nontender, nondistended. No appreciable organomegaly.   SKIN: Warm and dry without cutaneous eruptions  ; left shoulder impaired ROM, l second mcp joint swollen  PSYCHIATRIC: Anxious mood, nl affect Fair insight, and judgment    WBC 9.5, hemoglobin 11.5, platelets 299  Creatinine 0.64  6/28 Blood cultures no growth to date  6/28 left ankle arthrocentesis culture Group C strep (red blood cells 200,000, white blood cells 57,400, negative crystals)    A/p  1.  Septic arthritis L ankle d/t Group C strep, s/p I and D on 6/30  2.  Left shoulder synovitis  3.  Polyarticular arthritis flare with positive RF; AARON negative, ccp p  4.  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  Her blood cultures are negative, but in review it appears that antibiotics were started prior to collection of blood cultures.  Therefore her blood cultures may be artificially negative.  Admission echo showed aortic valve calcification but no karis vegetation.  She is going to need prolonged IV antibiotics, so not sure KENNETH would add much.  We will see what cardiology says    · Cx with Group C strep and would Cont on rocephin 2 g IV every 24 hours, tentative stop date July 27, 2022    · The aspiration of the left shoulder was not technically successful and only scant amount of fluid  yielded.  I think given the appearance of the MRI and acute onset of pain, I cannot attribute her shoulder pain to a chronic rotator cuff process.  Therefore I have to presume that this is infected.  F/u pending cx    · Her second MCP joint on the left side is more prominent today. I will check MRI of the left hand to look for septic arthritis.  If positive, may need to involve hand surgery    · We will also check an MRI of the back to look for any type of discitis.  She thinks her back pain is similar to chronic issues, but I explained the rationale for checking an MRI of the back.    ADDENDUM: Called by neuroradiology that patient has lumbar discitis.  Nothing clear to debride but will ask for spine surgery eval to make sure they are in agreement. I would give her full 6 weeks of abx through August 10.

## 2022-07-01 NOTE — PROGRESS NOTES
Orthopedic Progress Note      Patient: Marylu Georges  Date of Admission: 6/27/2022  YOB: 1960  Medical Record Number: [id number]    POD # :  1 Day Post-Op Procedure(s) (LRB):  INCISION AND DRAINAGE left ankle (Left)    Patient seen exam this morning.  She states her ankle pain is little bit better.  She is having some back spasms and is frustrated she taken her pain medicine every 4 hours but it is as needed but when she has her medicine she does fine.  She is moving her arm and leg more when directed but has not done much therapy.    Physical Exam:  62 y.o.  female  Vitals:  Temp:  [97.5 °F (36.4 °C)-99.9 °F (37.7 °C)] 99.3 °F (37.4 °C)  Heart Rate:  [] 100  Resp:  [16-18] 17  BP: (107-148)/() 135/92  alert and oriented    Ext: Operative extremity the movement of her toes has improved.  She states she can move her leg but I kindly instructed her to do so she was able to she is just nervous that it will make her back hurt she is having some back spasms.  2+ pedal pulses.  Calf soft compressible.  She does continue move her elbow she is moving her shoulder better today but still has some limited motion.      Skin: Incision clean dry and intact w/out signs or  symptoms of infection.    Activity: Mobilizing Per P.T.   Weight Bearing: As Tolerated    Data Review     No results displayed because visit has over 200 results.          No results found.    Medications:  cefTRIAXone, 2 g, Intravenous, Q24H  clobetasol, 1 application, Topical, Q12H  digoxin, 125 mcg, Oral, Daily  exemestane, 25 mg, Oral, Daily  famotidine, 20 mg, Oral, BID AC  hydrOXYzine pamoate, 25 mg, Oral, TID  lidocaine, 1 patch, Transdermal, Q24H  metoprolol tartrate, 25 mg, Oral, Q8H  sodium chloride, 10 mL, Intravenous, Q12H      •  senna-docusate sodium **AND** polyethylene glycol **AND** bisacodyl **AND** bisacodyl  •  HYDROcodone-acetaminophen  •  nitroglycerin  •  ondansetron **OR** ondansetron  •  [COMPLETED] Insert  peripheral IV **AND** sodium chloride  •  sodium chloride  •  tiZANidine  •  traZODone            Assessment:  Doing well POD  # 1 Day Post-Op Procedure(s) (LRB):  INCISION AND DRAINAGE left ankle (Left)  Problems Addressed this Visit        Cardiac and Vasculature    * (Principal) Atrial fibrillation (HCC)       Other    Streptococcal arthritis of left ankle (HCC) - Primary    Relevant Orders    Case Request (Completed)      Other Visit Diagnoses     Elevated LFTs        Elevated C-reactive protein (CRP)        Elevated sed rate        Edema of both upper extremities          Diagnoses       Codes Comments    Streptococcal arthritis of left ankle (HCC)    -  Primary ICD-10-CM: M00.272  ICD-9-CM: 711.07, 041.00     Atrial fibrillation, unspecified type (HCC)     ICD-10-CM: I48.91  ICD-9-CM: 427.31     Elevated LFTs     ICD-10-CM: R79.89  ICD-9-CM: 790.6     Elevated C-reactive protein (CRP)     ICD-10-CM: R79.82  ICD-9-CM: 790.95     Elevated sed rate     ICD-10-CM: R70.0  ICD-9-CM: 790.1     Edema of both upper extremities     ICD-10-CM: R60.0  ICD-9-CM: 782.3           Plan:    Continue efforts to mobilize  Continue Pain Control Measures  Ice and elevate left lower extremity as needed.  Continue incisional Care  DVT prophylaxis per primary team if needed  Antibiotics per infectious disease.    MRI was done of the elbow and shoulder as previously noted.  Appears MRI of the elbow shows some cellulitis however there is concern for left shoulder synovitis due to inflammatory versus infection.  Left shoulder aspiration was ordered with a not yield any fluid.  Do not feel that her shoulder is infected this time is likely multiplication of her degenerative changes in her shoulder and this onset of inflammatory arthropathy.  We will continue to monitor.      Orthopedic discharge instructions been placed.  Please call with questions or concerns thank you    Kenneth Tran MD    Date: 7/1/2022  Time: 07:58 EDT

## 2022-07-01 NOTE — PROGRESS NOTES
[facility] Progress Note       Encounter Date:22  Patient:Marylu Georges  :1960  MRN:[id number]      Chief Complaint: PAF, diastolic heart failure      Subjective:      Conversant, denies palpitations    Has back spasms and pain    Medications:  Scheduled Meds:  cefTRIAXone, 2 g, Intravenous, Q24H  clobetasol, 1 application, Topical, Q12H  digoxin, 125 mcg, Oral, Daily  exemestane, 25 mg, Oral, Daily  famotidine, 20 mg, Oral, BID AC  hydrOXYzine pamoate, 25 mg, Oral, TID  lidocaine, 1 patch, Transdermal, Q24H  metoprolol tartrate, 50 mg, Oral, Q12H  sodium chloride, 10 mL, Intravenous, Q12H    Continuous Infusions:  lactated ringers, 9 mL/hr, Last Rate: 9 mL/hr (22 0805)  lactated ringers, 100 mL/hr, Last Rate: 100 mL/hr (22 1142)    PRN Meds:  •  senna-docusate sodium **AND** polyethylene glycol **AND** bisacodyl **AND** bisacodyl  •  HYDROcodone-acetaminophen  •  nitroglycerin  •  ondansetron **OR** ondansetron  •  [COMPLETED] Insert peripheral IV **AND** sodium chloride  •  sodium chloride  •  tiZANidine  •  traZODone         Objective:       Vitals:    22 1702 22 1932 22 2255 22 0735   BP: 123/83 115/76 136/85 135/92   BP Location:  Right arm Right arm Right arm   Patient Position:  Lying Lying Lying   Pulse: 99 96 100    Resp:  18 18 17   Temp:  98.5 °F (36.9 °C) 99.9 °F (37.7 °C) 99.3 °F (37.4 °C)   TempSrc:  Oral Oral Oral   SpO2:  97% 97% 95%   Weight:       Height:               Physical Exam:  Constitutional: Alert and conversant  HENT: Oropharynx clear and membrane moist  Eyes: Normal conjunctiva, no sclera icterus  Neck: Supple  Cardiovascular: Irregularly irregular rate and rhythm, No Murmur, No bilateral lower extremity edema.  Pulmonary: Normal respiratory effort, normal lung sounds  Abdominal: Soft, nontender  Neurological: Alert and orient x 3  Skin: Warm, dry, no ecchymosis, no rash. Arm, hand and bilateral ankle swelling at  joints  Psych: Appropriate mood and affect           Lab Review:   Results from last 7 days   Lab Units 07/01/22 0419 06/30/22  0414 06/29/22  0530 06/28/22  0502 06/27/22  0733   SODIUM mmol/L 134* 137 135* 132* 133*   POTASSIUM mmol/L 4.3 4.1 4.3 3.4* 3.5   CHLORIDE mmol/L 98 101 101 96* 98   CO2 mmol/L 25.7 25.0 23.6 20.0* 20.4*   BUN mg/dL 17 20 17 18 23   CREATININE mg/dL 0.64 0.58 0.68 0.76 0.97   GLUCOSE mg/dL 98 121* 149* 106* 131*   CALCIUM mg/dL 8.7 8.9 9.2 9.0 9.1   AST (SGOT) U/L  --  105* 49* 29 42*   ALT (SGPT) U/L  --  102* 51* 46* 71*     Results from last 7 days   Lab Units 06/27/22  0733   TROPONIN T ng/mL <0.010     Results from last 7 days   Lab Units 07/01/22 0419 06/30/22 0414 06/28/22  0503 06/27/22  0733   WBC 10*3/mm3 9.53 10.90* 7.75 8.06   HEMOGLOBIN g/dL 11.5* 12.8 12.0 12.7   HEMATOCRIT % 34.8 36.1 35.1 37.7   PLATELETS 10*3/mm3 299 266 166 144                   Invalid input(s): LDLCALC  Results from last 7 days   Lab Units 06/27/22  0733   PROBNP pg/mL 2,048.0*     Results from last 7 days   Lab Units 06/27/22  0733   TSH uIU/mL 1.820            Assessment:          Diagnosis Plan   1. Streptococcal arthritis of left ankle (HCC)  Case Request    Case Request   2. Atrial fibrillation, unspecified type (HCC)     3. Elevated LFTs     4. Elevated C-reactive protein (CRP)     5. Elevated sed rate     6. Edema of both upper extremities            Plan:       New onset atrial fibrillation / Hx of PAT:  - on oral metoprolol tartrate and digoxin. Received IV digoxin 250 mcg x1 6/29  - HR 100s this AM, BP is now a little higher. Increase metoprolol and change to BID dosing  - anticoagulated with Lovenox, held for ortho procedure yesterday. Discuss resuming with ortho    Diastolic heart failure:  - echo 6/27 with preserved LVEF 55%  - appears compensated    Arm swelling:  - negative doppler study    Polyarticular septic arthritis:  - blood cultures may be artificially negative as appear to  have been drawn after starting abx therapy  - TTE without obvious vegetation however there is some aortic valve vegetation. There is no regurgitation.  I do not believe that KENNETH would be beneficial as would not .  Will discuss with Dr. Odell  - ID is following. MRI hand ordered today. Remains on abx    PFO:  - small, noted on echo    Metastatic breast cancer  - prior bilateral mastectomy 2019, meds restarted per oncology    New atrial fibrillation this admission, rate remains 100s. Continue digoxin and increase metoprolol dosing as BP is now improved.  Patient needs to go back on systemic anticoagulation, I will check with ortho if okay to start today.  When no invasive procedures planned can transition to oral anticoagulation.  TTE without obvious vegetation or regurgitant valve. Do not think KENNETH will be beneficial and as discussed with ID will not change treatment.         ROBE Watkins  [facility]  07/01/22  09:32 EDT

## 2022-07-01 NOTE — PLAN OF CARE
"Goal Outcome Evaluation:  Plan of Care Reviewed With: patient           Outcome Evaluation: Pt. is now s/p Left Ankle I&D and can be WBAT per MD orders.  Pt. reports generalized pain but c/o \"back spasm\" that hurts the most and that limits her overall mobility this AM.  Pt. able to take 2-3 shuffled sidesteps up toward the head of the bed, Min. assist x 1, with use of Rwx this date. Pt. requires Min. assist x 1 for bed mobility (Via logroll) and Min. assist x 1 for sit <-> stand transfers.  Pt. performed sit <-> stand transfers x 2 reps for functional strength training. Pt. will benefit from continued skilled inpt. P.T. to address her functional deficits and to assist pt. in regaining her maximum level of independence with functional mobility.    Patient was not wearing a face mask during this therapy encounter. Therapist used appropriate personal protective equipment including eye protection, mask, and gloves.  Mask used was standard procedure mask. Appropriate PPE was worn during the entire therapy session. Hand hygiene was completed before and after therapy session. Patient is not in enhanced droplet precautions.     "

## 2022-07-01 NOTE — PROGRESS NOTES
[facility] will follow post hospital. Patient/spouse agreeable to service. Contact information confirmed. Physical address is [address] [place] [address].

## 2022-07-01 NOTE — PLAN OF CARE
Problem: Adult Inpatient Plan of Care  Goal: Plan of Care Review  Outcome: Ongoing, Progressing  Flowsheets (Taken 7/1/2022 1342)  Outcome Evaluation: PT on board, neuro sx consulted for discitis shown on MRI, pain meds increased, MRI L hand tomorrow, IV abx continued, vss, will continue to monitor

## 2022-07-01 NOTE — PLAN OF CARE
Goal Outcome Evaluation:VS stable , has a lot of pain not on the surgical site but on her back. She refused her fluid any way her fluid intake are good, able to pee around 600 cc of urine at one time.

## 2022-07-01 NOTE — PAYOR COMM NOTE
"Priscilla Davislamberto TILMLAN (62 y.o. Female)     ADDITIONAL INFORMATION FOR RECONSIDERATION FOR [id number]    CONTACT KAYLA NAVARRETE  P# [phone]  F# [phone]              Date of Birth   1960    Social Security Number       Address   [address] [place] [address]    Home Phone   [phone]    MRN   [id number]       Latter day   Zoroastrianism    Marital Status                               Admission Date   6/27/22    Admission Type   Emergency    Admitting Provider   Santhosh Miller MD    Attending Provider   Santhosh Miller MD    Department, Room/Bed   [address], E454/1       Discharge Date       Discharge Disposition       Discharge Destination                               Attending Provider: Santhosh Miller MD    Allergies: Benadryl [Diphenhydramine], Erythromycin, Levaquin [Levofloxacin], Penicillins    Isolation: None   Infection: None   Code Status: CPR   Advance Care Planning Activity    Ht: 167.6 cm (66\")   Wt: 70.3 kg (155 lb)    Admission Cmt: None   Principal Problem: Atrial fibrillation (HCC) [I48.91]                 Active Insurance as of 6/27/2022     Primary Coverage     Payor Plan Insurance Group Employer/Plan Group    [employer]HEM BLUE CROSS ANTHEM BLUE CROSS BLUE SHIELD PPO [id number]     Payor Plan Address Payor Plan Phone Number Payor Plan Fax Number Effective Dates    [address] [phone]  7/15/2009 - None Entered    [place] [address]       Subscriber Name Subscriber Birth Date Member ID       RHONDA DAVIS 1960 [id number]                 Emergency Contacts      (Rel.) Home Phone Work Phone Mobile Phone    FAMILIA DAVIS [facility] (Brother) [phone] -- [phone]    Cruz Landry (Spouse) [phone] -- [phone]               Operative/Procedure Notes (last 7 days)      Kenneth Tran MD at 06/30/22 0844        06/30/22    Pre-Operative Diagnosis: Septic left ankle joint    Post-Operative Diagnosis: Septic left " ankle joint    Procedure Performed: Irrigation debridement left ankle joint    Surgeon: Kenneth Tran MD     Assistant: None    Anesthesia: General    Complications: None.     Estimated Blood Loss: Less than 50 mL.     Specimens: * No orders in the log *        Brief Operative Indication: Pleasant 62-year-old female presented with continued ankle pain and swelling.  Aspiration was performed with cultures yielding positive results.  She was indicated for irrigation debridement left ankle joint.  The risks, benefits, and alternatives to open reduction, internal fixation were carefully discussed in detail.  I explained that surgical risks include infection, hematoma, nonunion, malunion, failure of fixation persistent pain, loss of motion, iatrogenic nerve and/or blood vessel injury resulting in permanent weakness, numbness or dysfunction (particularly the superficial branch of the peroneal nerve and saphenous nerves), DVT, PE, positioning related neuropraxia, and anesthesia related complications resulting in death.   The patient consented to proceed.    Description of Procedure in Detail:  The patient and operative site were identified in the preoperative holding area.   The surgical site was marked.  Preoperative antibiotics were administered.  The patient was then taken to the operating room where adequate monitored anesthesia care was administered.  The patient was repositioned on the operating table.  The left lower extremity was prepped and draped in the standard sterile fashion.  I cleaned the extremity with an alcohol solution.  The extremity was then prepped with Hibiclens followed by 2 ChloraPreps.  I allowed the ChloraPreps to dry for 3 minutes before the draping procedure was carried out.     A timeout was taken prior to surgical incision.  The tourniquet was inflated to 250 mmHg   All in the room agreed with the timeout.  Identified the medial gutter and palpated the anterior tibialis tendon.  Brought in  C arm to identify correct placement prior to making incision that we would be entering just over the joint medially.  This was confirmed with C arm.  the incision was just medial to the anterior tibialis tendon.  Sharply incised the skin with a knife.  I then carefully spread down and avoiding any nerves or vessels to the joint.  Retractors held soft tissue on the way.  I then entered the joint slightly discolored fluid was noted it was not overly purulent slightly more yellowish-brown in color.  Minimal fluid was expressed.  I then copiously irrigated with normal sterile saline and approximate total of 4 L through the joint.  Debrided any tissue around the joint area during as needed.  Once we properly irrigated did not appreciate any other purulence or tracking sinuses from the joint or other areas.  I think carefully closed superficially with 2-0 Monocryl suture in the skin with 3-0 nylon.  Tourniquet was deflated.  Soft dressings were applied as well as sterile wrap followed by Ace wrap.  Final counts were all correct.  Patient tolerated procedure well.  She was transferred from the OR to PACU vital signs stable.       Kenneth Tran MD  06/30/22        Electronically signed by Kenneth Tran MD at 06/30/22 1042          Physician Progress Notes (last 72 hours)      Juan Grossman MD at 07/01/22 0836        ID note for cellulitis?  S: She is having ongoing left sided back pain which is spasm-like.  Still with left shoulder pain.  Had BM  Feels very mobile  No fevers or chills or night sweats  Tolerating antibiotics    O: Temp:  [97.5 °F (36.4 °C)-99.9 °F (37.7 °C)] 99.3 °F (37.4 °C)  Heart Rate:  [] 100  Resp:  [16-18] 17  BP: (107-148)/() 135/92  GENERAL: Sickly, nontoxic  HEENT: Oropharynx is clear. Hearing is grossly normal.   EYES: . No conjunctival injection. No lid lag.   HEART: tachy, irr 1-2+ LUE peripheral edema.    LUNGS: Clear to auscultation anteriorly with normal  respiratory effort.   GI: Soft, nontender, nondistended. No appreciable organomegaly.   SKIN: Warm and dry without cutaneous eruptions  ; left shoulder impaired ROM, l second mcp joint swollen  PSYCHIATRIC: Anxious mood, nl affect Fair insight, and judgment    WBC 9.5, hemoglobin 11.5, platelets 299  Creatinine 0.64  6/28 Blood cultures no growth to date  6/28 left ankle arthrocentesis culture Group C strep (red blood cells 200,000, white blood cells 57,400, negative crystals)    A/p  1.  Septic arthritis L ankle d/t Group C strep, s/p I and D on 6/30  2.  Left shoulder synovitis  3.  Polyarticular arthritis flare with positive RF; AARON negative, ccp p  4.  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  Her blood cultures are negative, but in review it appears that antibiotics were started prior to collection of blood cultures.  Therefore her blood cultures may be artificially negative.  Admission echo showed aortic valve calcification but no karis vegetation.  She is going to need prolonged IV antibiotics, so not sure KENNETH would add much.  We will see what cardiology says    · Cx with Group C strep and would Cont on rocephin 2 g IV every 24 hours, tentative stop date July 27, 2022    · The aspiration of the left shoulder was not technically successful and only scant amount of fluid yielded.  I think given the appearance of the MRI and acute onset of pain, I cannot attribute her shoulder pain to a chronic rotator cuff process.  Therefore I have to presume that this is infected.  F/u pending cx    · Her second MCP joint on the left side is more prominent today. I will check MRI of the left hand to look for septic arthritis.  If positive, may need to involve hand surgery    · We will also check an MRI of the back to look for any type of discitis.  She thinks her back pain is similar to chronic issues, but I explained the rationale for checking an MRI of the  back.    ADDENDUM: Called by neuroradiology that patient has lumbar discitis.  Nothing clear to debride but will ask for spine surgery eval to make sure they are in agreement. I would give her full 6 weeks of abx through August 10.    Electronically signed by Juan Grossman MD at 07/01/22 4332     Kenneth Tran MD at 07/01/22 1009          Orthopedic Progress Note      Patient: Marylu Georges  Date of Admission: 6/27/2022  YOB: 1960  Medical Record Number: [id number]    POD # :  1 Day Post-Op Procedure(s) (LRB):  INCISION AND DRAINAGE left ankle (Left)    Patient seen exam this morning.  She states her ankle pain is little bit better.  She is having some back spasms and is frustrated she taken her pain medicine every 4 hours but it is as needed but when she has her medicine she does fine.  She is moving her arm and leg more when directed but has not done much therapy.    Physical Exam:  62 y.o.  female  Vitals:  Temp:  [97.5 °F (36.4 °C)-99.9 °F (37.7 °C)] 99.3 °F (37.4 °C)  Heart Rate:  [] 100  Resp:  [16-18] 17  BP: (107-148)/() 135/92  alert and oriented    Ext: Operative extremity the movement of her toes has improved.  She states she can move her leg but I kindly instructed her to do so she was able to she is just nervous that it will make her back hurt she is having some back spasms.  2+ pedal pulses.  Calf soft compressible.  She does continue move her elbow she is moving her shoulder better today but still has some limited motion.      Skin: Incision clean dry and intact w/out signs or  symptoms of infection.    Activity: Mobilizing Per P.T.   Weight Bearing: As Tolerated    Data Review     No results displayed because visit has over 200 results.          No results found.    Medications:  cefTRIAXone, 2 g, Intravenous, Q24H  clobetasol, 1 application, Topical, Q12H  digoxin, 125 mcg, Oral, Daily  exemestane, 25 mg, Oral, Daily  famotidine, 20 mg, Oral, BID  AC  hydrOXYzine pamoate, 25 mg, Oral, TID  lidocaine, 1 patch, Transdermal, Q24H  metoprolol tartrate, 25 mg, Oral, Q8H  sodium chloride, 10 mL, Intravenous, Q12H      •  senna-docusate sodium **AND** polyethylene glycol **AND** bisacodyl **AND** bisacodyl  •  HYDROcodone-acetaminophen  •  nitroglycerin  •  ondansetron **OR** ondansetron  •  [COMPLETED] Insert peripheral IV **AND** sodium chloride  •  sodium chloride  •  tiZANidine  •  traZODone            Assessment:  Doing well POD  # 1 Day Post-Op Procedure(s) (LRB):  INCISION AND DRAINAGE left ankle (Left)  Problems Addressed this Visit        Cardiac and Vasculature    * (Principal) Atrial fibrillation (HCC)       Other    Streptococcal arthritis of left ankle (HCC) - Primary    Relevant Orders    Case Request (Completed)      Other Visit Diagnoses     Elevated LFTs        Elevated C-reactive protein (CRP)        Elevated sed rate        Edema of both upper extremities          Diagnoses       Codes Comments    Streptococcal arthritis of left ankle (HCC)    -  Primary ICD-10-CM: M00.272  ICD-9-CM: 711.07, 041.00     Atrial fibrillation, unspecified type (HCC)     ICD-10-CM: I48.91  ICD-9-CM: 427.31     Elevated LFTs     ICD-10-CM: R79.89  ICD-9-CM: 790.6     Elevated C-reactive protein (CRP)     ICD-10-CM: R79.82  ICD-9-CM: 790.95     Elevated sed rate     ICD-10-CM: R70.0  ICD-9-CM: 790.1     Edema of both upper extremities     ICD-10-CM: R60.0  ICD-9-CM: 782.3           Plan:    Continue efforts to mobilize  Continue Pain Control Measures  Ice and elevate left lower extremity as needed.  Continue incisional Care  DVT prophylaxis per primary team if needed  Antibiotics per infectious disease.    MRI was done of the elbow and shoulder as previously noted.  Appears MRI of the elbow shows some cellulitis however there is concern for left shoulder synovitis due to inflammatory versus infection.  Left shoulder aspiration was ordered with a not yield any fluid.  Do  not feel that her shoulder is infected this time is likely multiplication of her degenerative changes in her shoulder and this onset of inflammatory arthropathy.  We will continue to monitor.      Orthopedic discharge instructions been placed.  Please call with questions or concerns thank you    Kenneth Tran MD    Date: 7/1/2022  Time: 07:58 EDT      Electronically signed by Kenneth Tran MD at 07/01/22 0805     Danelle Cespedes MD PhD at 06/30/22 1928           LOS: 1 day   Patient Care Team:  Elie Dixon MD as PCP - General (Hematology and Oncology)  Joby Barron Jr., MD as Referring Physician (General Surgery)  Elie Dixon MD as Consulting Physician (Hematology and Oncology)  Pedro Marmolejo MD as Consulting Physician (Radiation Oncology)    Chief Complaint: Bilateral arm edema and tenderness with both hands and also ankles..    Interval History:  Patient was seen by dermatologist Dr. Parks on 6/27/2022.  He thinks patient has gouty arthritis versus rheumatoid arthritis versus infection.    Patient was seen by ID service Dr Grossman 6/28/2022, no evidence for bacterial infection.     6/29   Patient remains afebrile and vitally stable.  However unfortunately the left ankle aspiration grew out positive for streptococcal beta-hemolytic group C.  Prednisone was discontinued and restarted back on IV antibiotics today.  Patient also complains of pain in the left hip area.    6/30/22   Patient remains afebrile.  Patient had surgery in the morning for irrigation and debridement of the left ankle joint.       A comprehensive 14 point review of systems was performed and was negative except as mentioned.    Vital Signs:  Temp:  [97 °F (36.1 °C)-98.7 °F (37.1 °C)] 97.7 °F (36.5 °C)  Heart Rate:  [] 99  Resp:  [16-18] 18  BP: (107-148)/() 123/83    Intake/Output Summary (Last 24 hours) at 6/30/2022 1928  Last data filed at 6/30/2022 1832  Gross per 24 hour   Intake 820 ml   Output 300 ml    Net 520 ml       Physical Exam:  GENERAL:  Well-developed, well-nourished in no acute distress.  Patient is sleepy likely due to effect of anesthesia.  SKIN:  Warm.  Skin rashes in neck and upper chest seems improved.  Also bilateral arm skin erythematous changes also improved.  Left ankle is wrapped in dressing.  HEENT:  Normocephalic.  LYMPHATICS:  No cervical adenopathy.  CHEST: Normal respiratory effort.  Lungs clear to auscultation. .  CARDIAC: Irregular rhythm and rate controlled.  Normal S1,S2.  ABDOMEN:  Soft, nontender.  Bowel sounds normal.  EXTREMITIES: Bilateral arm edema involving also both hands with significant MCP joints and tenderness on both hands.  Left ankle postop.  NEUROLOGICAL:  Grossly intact.    PSYCHIATRIC:  Normal affect and mood.        Results Review:   Results from last 7 days   Lab Units 06/30/22 0414 06/28/22  0503 06/27/22  0733   WBC 10*3/mm3 10.90* 7.75 8.06   HEMOGLOBIN g/dL 12.8 12.0 12.7   HEMATOCRIT % 36.1 35.1 37.7   PLATELETS 10*3/mm3 266 166 144   MONOCYTES % %  --   --  3.6*       Lab Results   Component Value Date    NEUTROABS 7.48 (H) 06/27/2022     Results from last 7 days   Lab Units 06/30/22  0414 06/29/22  0530 06/28/22  0502   SODIUM mmol/L 137 135* 132*   POTASSIUM mmol/L 4.1 4.3 3.4*   CHLORIDE mmol/L 101 101 96*   CO2 mmol/L 25.0 23.6 20.0*   BUN mg/dL 20 17 18   CREATININE mg/dL 0.58 0.68 0.76   CALCIUM mg/dL 8.9 9.2 9.0   BILIRUBIN mg/dL 0.3 0.4 0.5   ALK PHOS U/L 139* 151* 142*   ALT (SGPT) U/L 102* 51* 46*   AST (SGOT) U/L 105* 49* 29   GLUCOSE mg/dL 121* 149* 106*     Lab Results   Component Value Date    URICACID 5.4 06/28/2022     Lab Results   Component Value Date    CRP 40.24 (H) 06/27/2022       Results from last 7 days   Lab Units 06/27/22  0733   TROPONIN T ng/mL <0.010                       I reviewed the patient's new clinical results.        Assessment:    1.  Left breast cancer, locally advanced, status post neoadjuvant chemotherapy with AC plus  T finished in July 2019, followed by bilateral mastectomy and lymph node dissection in September 2019.  Pathology evaluation reported no residual disease.  She subsequently received a adjuvant reading therapy.  She also was started on adjuvant endocrine therapy with Femara in August 2019.  Patient later developed localized metastatic disease, and was started on Kisqali in conjunction with the letrozole/Femara.  Because of elevated liver function panel, Kisqali was discontinued after 1 month.  Eventually  letrozole/Femara.was also discontinued in December 2021.  She was started on Aromasin in late January 2022.  Patient was recently seen by Dr. Dixon on 5/21/2022, with no evidence for metastatic disease.  · On 6/28/2022, her skin rashes and arthritis has nothing to do with endocrine therapy.  Will resume exemestane 25 mg daily.     2.  Bilateral arm edema, arthralgia involving bilateral MCP joints and skin rashes.  · Venous Doppler study earlier today was negative for any venous thrombosis.   · I think this patient is to have dermatology evaluation.  Patient denies any new medication, no exposure to direct sunshine for extended time.  · Patient was seen by dermatologist  6/27/2022 for recommended blood culture uric acid in the collagen vascular labs as well as consultation for orthopedic surgery to drain the left ankle for C&S.  · 6/28/2022, patient had a normal uric acid 5.4.  So unlikely gout/gouty arthritis.  Patient was seen by ID service Dr. Grossman who does not think patient has infection and stopped IV antibiotics.   · I discussed with patient today on 6/28/2022, recommended to start prednisone 30 mg daily for 3 days, and then can be tapered gradually and then she needs to see rheumatologist as outpatient.  I explained to patient the side effects with insomnia, agitation, increased appetite.  · Prednisone was discontinued on 6/29/2022 because of positive bacterial growth from the left ankle  aspiration.   · Skin rashes seems slightly better 2022.     3.    Septic ankle.    · Left ankle edema and tender.     · Venous Doppler study on 2022 was negative for DVT in the lower extremities.    · However left ankle aspiration growth positive streptococcal beta hemolytic group C, reported on 2022.  Patient was started back on IV Rocephin per ID service.  · In the morning on 2022, patient had surgery for irrigation and debridement of the left septic ankle.     4.  New onset atrial fibrillation.  Patient is on beta-blocker.  She is on Lovenox anticoagulation.  Patient will follow by cardiology service.        PLAN:  1. Continue IV Rocephin, followed by ID service.    2. Status post irrigation and debridement of the left septic ankle 2022.    3. Continue exemestane 25 mg daily.  4. Continue Pepcid 20 mg twice a day for GI prophylaxis.   5. Continue Lovenox anticoagulation for now.  Outpatient anticoagulation management per cardiology.   6. Management of atrial fibrillation per cardiology service and primary team.   7. Patient has follow-up appointment with Dr. Dixon on 2022.  We will keep that appointment.       Discussed with the patient and her brother at bedside.      Danelle Cespedes MD PhD  22  19:28 EDT        Electronically signed by Danelle Cespedes MD PhD at 22 8944     Santhosh Miller MD at 22 1440              Name: Marylu Georges ADMIT: 2022   : 1960  PCP: Elie Dixon MD    MRN: [id number] LOS: 1 days   AGE/SEX: 62 y.o. female  ROOM: [facility]     Subjective   Subjective     Went for I&D of the left ankle joint today. Note that joint fluid was not overtly purulent during surgery. Then based on MRI of her left arm, she underwent left shoulder arthrocentesis but only scant material was able to be collected. She currently is in afib, but her heart rate is adequately controlled in the 90s.      Objective   Objective   Vital Signs  Temp:  [97  °F (36.1 °C)-98.7 °F (37.1 °C)] 97.7 °F (36.5 °C)  Heart Rate:  [] 84  Resp:  [16-18] 18  BP: (120-148)/() 137/100  SpO2:  [91 %-100 %] 93 %  on  Flow (L/min):  [2] 2;   Device (Oxygen Therapy): room air  Body mass index is 25.02 kg/m².  Physical Exam  Constitutional:       General: She is not in acute distress.     Appearance: She is not ill-appearing.   Cardiovascular:      Rate and Rhythm: Normal rate. Rhythm irregular.      Heart sounds: Normal heart sounds.   Pulmonary:      Effort: Pulmonary effort is normal.      Breath sounds: Normal breath sounds.   Abdominal:      General: Bowel sounds are normal.      Palpations: Abdomen is soft.   Musculoskeletal:         General: Swelling and tenderness present.      Right lower leg: No edema.      Left lower leg: Edema present.   Skin:     Findings: Erythema and rash present.      Comments: Bilateral upper extremity swelling on both arms to her hands bilaterally, worse on the left, with blotchy erythema and warmth and tenderness, both improving dramatically. Swelling is still rather prominant, but the erythema/rash, warmth, tenderness are all greatly reduced  Left ankle dressed   Neurological:      Mental Status: She is alert.   Psychiatric:         Mood and Affect: Mood normal.         Behavior: Behavior normal.         Results Review     I reviewed the patient's new clinical results.  Results from last 7 days   Lab Units 06/30/22  0414 06/28/22  0503 06/27/22  0733   WBC 10*3/mm3 10.90* 7.75 8.06   HEMOGLOBIN g/dL 12.8 12.0 12.7   PLATELETS 10*3/mm3 266 166 144     Results from last 7 days   Lab Units 06/30/22  0414 06/29/22  0530 06/28/22  0502 06/27/22  0733   SODIUM mmol/L 137 135* 132* 133*   POTASSIUM mmol/L 4.1 4.3 3.4* 3.5   CHLORIDE mmol/L 101 101 96* 98   CO2 mmol/L 25.0 23.6 20.0* 20.4*   BUN mg/dL 20 17 18 23   CREATININE mg/dL 0.58 0.68 0.76 0.97   GLUCOSE mg/dL 121* 149* 106* 131*   Estimated Creatinine Clearance: 108.7 mL/min (by C-G formula  based on SCr of 0.58 mg/dL).  Results from last 7 days   Lab Units 06/30/22  0414 06/29/22  0530 06/28/22  0502 06/27/22  0733   ALBUMIN g/dL 2.90* 2.90* 3.00* 3.40*   BILIRUBIN mg/dL 0.3 0.4 0.5 0.7   ALK PHOS U/L 139* 151* 142* 154*   AST (SGOT) U/L 105* 49* 29 42*   ALT (SGPT) U/L 102* 51* 46* 71*     Results from last 7 days   Lab Units 06/30/22  0414 06/29/22  0530 06/28/22  0502 06/27/22  0733   CALCIUM mg/dL 8.9 9.2 9.0 9.1   ALBUMIN g/dL 2.90* 2.90* 3.00* 3.40*     Results from last 7 days   Lab Units 06/28/22  0502   PROCALCITONIN ng/mL 0.64*     COVID19   Date Value Ref Range Status   06/30/2022 Not Detected Not Detected - Ref. Range Final   06/27/2022 Not Detected Not Detected - Ref. Range Final   07/17/2021 Not Detected Not Detected - Ref. Range Final     No results found for: HGBA1C, POCGLU    FL Guided Aspiration Joint  Narrative: FLUOROSCOPIC GUIDED LEFT SHOULDER ASPIRATION     HISTORY: 62-year-old female with MRI findings indicating synovitis at  the left glenohumeral and AC joints. Septic arthritis of the ankle.     TECHNIQUE: Risks and benefits were reviewed with the patient. Reviewed  risks include but are not limited to bleeding and infection.  Alternatives discussed and informed consent was obtained. It was  discussed that there is a risk of failure to obtain adequate or  representative sample.      The patient was placed in a supine fashion on the fluoroscopy table and  the skin over the left anterior shoulder was prepped and draped in  sterile fashion. Local anesthesia achieved with 1% lidocaine. Under  intermittent fluoroscopic guidance an 18-gauge needle was advanced into  the glenohumeral joint. A few small drops of fluid were aspirated though  no further fluid could be obtained despite needle repositioning.     Attention was then turned to the acromioclavicular joint and local  anesthesia achieved with 1% lidocaine. An 18-gauge needle was advanced  into the AC joint, though no fluid could  be aspirated from the AC joint.     Impression: Fluoroscopic guided left glenohumeral joint arthrocentesis  yielded scant material with only a few drops of fluid aspirated and this  was sent to laboratory though may be too small a sample to yield cell  count. Arthrocentesis of the left AC joint failed to yield fluid.      FLUOROSCOPY TIME: Thirteen seconds, 4 images.     This report was finalized on 6/30/2022 2:17 PM by Dr. Andrae Rivas M.D.     XR Ankle 2 View Left  Narrative: LEFT ANKLE     HISTORY: Incision and drainage left ankle.     COMPARISON: None.     FINDINGS: Two AP views of the left ankle obtained for intraoperative  localization and control. Imaging demonstrates a radiodense marker  superimposing the medial aspect of the tibiotalar joint space. There are  areas of lucency within the lateral aspect of the distal tibial  diametaphysis which appear well circumscribed and are nonspecific. There  are no previous exams for comparison.     Impression: Intraoperative exam demonstrates markers overlying the  tibiotalar joint. Lucencies are present within the cancellous bone of  the lateral aspect of the distal tibial diametaphysis and this may be  chronic, though could be correlated with previous x-rays.     Fluoroscopy time 5 seconds.     This report was finalized on 6/30/2022 10:06 AM by Dr. Andrae Rivas M.D.     FL C Arm During Surgery  This procedure was auto-finalized with no dictation required.  MRI Elbow Left With & Without Contrast  Narrative: LEFT ELBOW MRI WITH AND WITHOUT CONTRAST     HISTORY: Breast cancer with lymphedema. Arm and shoulder swelling.  Possible rheumatoid arthritis with recently diagnosed septic arthritis  at the ankle.     TECHNIQUE: Left elbow MRI was performed before and after the IV  administration of 15 mL MultiHance contrast. A shoulder MRI performed on  the same evening is reported separately.     FINDINGS: There is diffuse soft tissue edema around the elbow  with  abnormal enhancement in the subcutaneous tissue. There is a thin-walled,  nonenhancing olecranon bursal fluid collection measuring about 35 mm  transverse and 4 mm thick. No effusion is present at the elbow. Minimal  synovial enhancement, within normal limits, is observed in the elbow  joint spaces.     There is a chronic, large tear at the common extensor tendon origin  measuring about 12 mm AP and 9 mm transverse. A thin bundle of posterior  fibers remains intact. A 3 mm diameter interstitial tear is also  observed at the common flexor origin. The other muscles and tendons  around the elbow appear normal.     Impression: 1. Cellulitis around the left elbow with a benign-appearing fluid  collection in the olecranon bursa. There is no MRI evidence of septic  arthritis or osteomyelitis at the elbow.  2. Incidentally noted is a chronic, large tear at the common extensor  tendon origin and a small interstitial tear at the common flexor tendon  origin.     This report was finalized on 6/30/2022 8:31 AM by Dr. Tony Amaro M.D.     MRI Shoulder Left With & Without Contrast  Narrative: LEFT SHOULDER MRI WITH AND WITHOUT CONTRAST     HISTORY: Shoulder pain. Septic arthritis at the ankle. Polyarticular  arthritis. Prior breast cancer.     TECHNIQUE: MRI of left shoulder was performed before and after the IV  administration of 15 mL of MultiHance contrast and is correlated with  the orthopedic consultation note by Dr. Tran from mid day yesterday,  infectious disease progress note by Dr. Grossman from yesterday. Elbow  MRI was also performed today and is reported separately.     FINDINGS: There is degenerative change at the acromioclavicular joint  with some undersurface osseous prominence. A chronic, full-thickness  anterior distal supraspinatus tendon tear measures about 17 mm wide and  the torn tendon end is retracted about 20 mm. There is no muscular  atrophy. The posterior portion of the supraspinatus  remains intact. The  other rotator cuff tendons are intact.     There is degenerative change at the glenohumeral joint with irregular  chondral loss at the glenoid centrally where there are several small  subcortical cysts. The large subcortical cyst measures about 5 mm. The  long head biceps tendon is intact.     There is a small volume of fluid in the glenohumeral joint and the  subacromial space with abnormal synovitis and periarticular soft tissue  enhancement around the glenohumeral joint, subacromial space, and  acromioclavicular joints. There is some abnormal enhancement extending  into the axilla.     There is no evidence of mass lesion around the shoulder. There is no  lymphadenopathy.     Impression: 1. Abnormal synovitis at the left glenohumeral joint, acromioclavicular  joint, and subacromial space. These spaces may all communicate with one  another due to the large, chronic full-thickness supraspinatus tendon  tear. The changes are consistent with inflammatory or infectious  arthritis.  2. Chronic, full-thickness left supraspinatus tendon tear without  muscular atrophy.  3. Chronic degenerative change at the glenohumeral and acromioclavicular  joints.     This report was finalized on 6/30/2022 7:56 AM by Dr. Tony Amaro M.D.       Scheduled Medications  ceFAZolin, 2 g, Intravenous, Q8H  cefTRIAXone, 2 g, Intravenous, Q24H  clobetasol, 1 application, Topical, Q12H  digoxin, 125 mcg, Oral, Daily  exemestane, 25 mg, Oral, Daily  famotidine, 20 mg, Oral, BID AC  hydrOXYzine pamoate, 25 mg, Oral, TID  lidocaine, 1 patch, Transdermal, Q24H  metoprolol tartrate, 25 mg, Oral, Q8H  sodium chloride, 10 mL, Intravenous, Q12H    Infusions  lactated ringers, 9 mL/hr, Last Rate: 9 mL/hr (06/30/22 0805)  lactated ringers, 100 mL/hr, Last Rate: 100 mL/hr (06/30/22 1142)    Diet  Diet Regular      Assessment/Plan     Active Hospital Problems    Diagnosis  POA   • **Atrial fibrillation (HCC) [I48.91]  Yes   • New  onset atrial fibrillation (HCC) [I48.91]  Yes   • Inflammatory arthritis [M19.90]  Yes   • Transaminitis [R74.01]  Yes   • Lymphedema of upper extremity, bilateral [I89.0]  Yes   • Rash [R21]  Yes   • Streptococcal arthritis of left ankle (HCC) [M00.272]  Unknown   • Malignant neoplasm of overlapping sites of left breast in female, estrogen receptor positive (HCC) [C50.812, Z17.0]  Not Applicable      Resolved Hospital Problems   No resolved problems to display.       62 y.o. female admitted with Atrial fibrillation (HCC).    · New onset afib with rvr-tsh was normal. Echocardiogram with patent foramen ovale and severely dilated left atrial cavity. Continue digoxin, and metoprolol. lovenox on hold due to procedures. Restart AC when okay with orthopedic surgery.  · Left ankle septic arthritis with group c strep-s/p I&D today with orthopedic surgery. On ceftriaxone.  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 cellulitis of the bilateral upper extremities and chest-greatly improved with topical steroids, antihistamines, and abx.  · Elevated liver enzymes-acute hepatitis panel was negative. LFTs are worse today. Will check a liver u/s.   · History of left sided breast cancer s/p bilateral mastectomy in 2019, chemo/xrt-aromasin  · History of pericardial mass which resolved with chemotherapy-not seen on TTE this admission  · History of CVA  · Essential hypertension-adequate control acutely  · lovenox on hold due to procedures for DVT prophylaxis.  · pepcid for gi ppx   · Full code.  · Discussed with patient, family and nursing staff.  · Anticipate discharge home timing yet to be determined.      Santhosh Miller MD  [facility]  06/30/22  14:40 EDT    I wore protective equipment throughout this patient encounter  including a face mask, gloves and protective eyewear.  Hand hygiene was performed before donning protective equipment and after removal when leaving the room.        Electronically signed by Santhosh Miller MD at 06/30/22 0821     Pema Ledesma APRN at 06/30/22 1358        Patient was in OR when tried to see this morning.  On chart review HR appears controlled  Will see again tomorrow    ISAIAS Watkins  [facility]    Electronically signed by Pema Ledesma APRN at 06/30/22 1356     Juan Grossman MD at 06/30/22 1033        ID note for cellulitis?  S: Seen after I and D of ankle today.   No f/c/ns  C/o some left sided back spasm, no spinous process ttp  O: Temp:  [97 °F (36.1 °C)-98.7 °F (37.1 °C)] 97.5 °F (36.4 °C)  Heart Rate:  [] 106  Resp:  [14-18] 16  BP: (118-148)/() 148/96  GENERAL: Sickly, nontoxic  HEENT: Oropharynx is clear. Hearing is grossly normal.   EYES: . No conjunctival injection. No lid lag.   HEART: tachy, irr 1-2+ LUE peripheral edema.    LUNGS: Clear to auscultation anteriorly with normal respiratory effort.   GI: Soft, nontender, nondistended. No appreciable organomegaly.   SKIN: Warm and dry without cutaneous eruptions   PSYCHIATRIC: Appropriate mood, affect Fair insight, and judgment (slightly drowsy from anesthesia)    WBC 10.9  Cr 0.58  Blood cultures no growth to date  6/28 left ankle arthrocentesis culture Group C strep (red blood cells 200,000, white blood cells 57,400, negative crystals)    A/p  1.  Septic arthritis L ankle d/t Group C strep, s/p I and D on 6/30  2.  Left shoulder synovitis  3.  Polyarticular arthritis flare with positive RF; AARON negative, ccp p  4.  AFib, heart rate improved    cx with Group C strep and would Cont on rocephin.  MRI ordered yesterday and has synovitis of left shoulder with chronic supraspinatus tendon tear  S/p I and D by Dr Tran.  D/w Dr Tran yesterday and today. Planning IR aspiration L  shoulder        Electronically signed by Juan Grossman MD at 22 1039     Santhosh Miller MD at 22 1301              Name: Marylu Georges ADMIT: 2022   : 1960  PCP: Elie Dixon MD    MRN: [id number] LOS: 0 days   AGE/SEX: 62 y.o. female  ROOM: [facility]     Subjective   Subjective     No complaints today. Surprisingly, her arthrocentesis culture came back positive for group c strep      Objective   Objective   Vital Signs  Temp:  [98.2 °F (36.8 °C)-99.5 °F (37.5 °C)] 98.2 °F (36.8 °C)  Heart Rate:  [] 95  Resp:  [17-18] 18  BP: (107-127)/(72-92) 121/78  SpO2:  [94 %-95 %] 94 %  on   ;   Device (Oxygen Therapy): room air  Body mass index is 25.02 kg/m².  Physical Exam  Constitutional:       General: She is not in acute distress.     Appearance: She is not ill-appearing.   Cardiovascular:      Rate and Rhythm: Normal rate. Rhythm irregular.      Heart sounds: Normal heart sounds.   Pulmonary:      Effort: Pulmonary effort is normal.      Breath sounds: Normal breath sounds.   Abdominal:      General: Bowel sounds are normal.      Palpations: Abdomen is soft.   Musculoskeletal:         General: Swelling and tenderness present.      Right lower leg: No edema.      Left lower leg: Edema present.   Skin:     Findings: Erythema and rash present.      Comments: Bilateral upper extremity swelling on both arms to her hands bilaterally, worse on the left, with blotchy erythema and warmth and tenderness, both improving  Left ankle with bandage over arthrocentesis site   Neurological:      Mental Status: She is alert.   Psychiatric:         Mood and Affect: Mood normal.         Behavior: Behavior normal.         Results Review     I reviewed the patient's new clinical results.  Results from last 7 days   Lab Units 22  0503 22  0733   WBC 10*3/mm3 7.75 8.06   HEMOGLOBIN g/dL 12.0 12.7   PLATELETS 10*3/mm3 166 144     Results from last 7 days   Lab Units 22  0530  06/28/22  0502 06/27/22  0733   SODIUM mmol/L 135* 132* 133*   POTASSIUM mmol/L 4.3 3.4* 3.5   CHLORIDE mmol/L 101 96* 98   CO2 mmol/L 23.6 20.0* 20.4*   BUN mg/dL 17 18 23   CREATININE mg/dL 0.68 0.76 0.97   GLUCOSE mg/dL 149* 106* 131*   Estimated Creatinine Clearance: 92.8 mL/min (by C-G formula based on SCr of 0.68 mg/dL).  Results from last 7 days   Lab Units 06/29/22  0530 06/28/22  0502 06/27/22  0733   ALBUMIN g/dL 2.90* 3.00* 3.40*   BILIRUBIN mg/dL 0.4 0.5 0.7   ALK PHOS U/L 151* 142* 154*   AST (SGOT) U/L 49* 29 42*   ALT (SGPT) U/L 51* 46* 71*     Results from last 7 days   Lab Units 06/29/22  0530 06/28/22  0502 06/27/22  0733   CALCIUM mg/dL 9.2 9.0 9.1   ALBUMIN g/dL 2.90* 3.00* 3.40*     Results from last 7 days   Lab Units 06/28/22  0502   PROCALCITONIN ng/mL 0.64*     COVID19   Date Value Ref Range Status   06/27/2022 Not Detected Not Detected - Ref. Range Final   07/17/2021 Not Detected Not Detected - Ref. Range Final     No results found for: HGBA1C, POCGLU    FL Guided Aspiration Joint  FLUOROSCOPIC GUIDED ASPIRATION OF LEFT ANKLE 06/28/2022     HISTORY: Left ankle swelling.     After signed informed consent was obtained the anterior medial aspect of  the left ankle was prepped and draped in the usual sterile fashion.  Lidocaine was used for local anesthesia.     18-gauge needle was introduced into the medial tibiotalar joint.  Approximately 2 cc of slightly cloudy and bloody fluid was aspirated.  Confirmatory images were obtained.     Fluid sample was sent to the laboratory for evaluation.     Patient tolerated the procedure well with no complications. Confirmatory  images were obtained.        FLUOROSCOPY TIME: 11 seconds, 1 images.     This report was finalized on 6/28/2022 3:08 PM by Dr. Anatoliy Hughes M.D.       Scheduled Medications  cefTRIAXone, 2 g, Intravenous, Q24H  clobetasol, 1 application, Topical, Q12H  [START ON 6/30/2022] digoxin, 125 mcg, Oral, Daily  enoxaparin, 1 mg/kg,  Subcutaneous, Q12H  exemestane, 25 mg, Oral, Daily  famotidine, 20 mg, Oral, BID AC  hydrOXYzine pamoate, 25 mg, Oral, TID  lidocaine, 1 patch, Transdermal, Q24H  metoprolol tartrate, 25 mg, Oral, Q8H  sodium chloride, 10 mL, Intravenous, Q12H    Infusions   Diet  Diet Regular; Cardiac, Consistent Carbohydrate  NPO Diet NPO Type: Sips with Meds      Assessment/Plan     Active Hospital Problems    Diagnosis  POA   • **Atrial fibrillation (HCC) [I48.91]  Yes   • Inflammatory arthritis [M19.90]  Yes   • Transaminitis [R74.01]  Yes   • Lymphedema of upper extremity, bilateral [I89.0]  Yes   • Rash [R21]  Yes   • Malignant neoplasm of overlapping sites of left breast in female, estrogen receptor positive (HCC) [C50.812, Z17.0]  Not Applicable      Resolved Hospital Problems   No resolved problems to display.       62 y.o. female admitted with Atrial fibrillation (HCC).    · New onset afib with rvr-tsh was normal. Echocardiogram with patent foramen ovale and severely dilated left atrial cavity. Continue lovenox, digoxin, and metoprolol per cardiology recommendations  · Polyarticular group c strep with cellulitis of the bilateral upper extremities-very surprising positive culture on arthrocentesis. Steroids have been discontinued and rocephin initiated, and orthopedic surgery consulted. MRIs of the left elbow and shoulder are pending as well  · Elevated liver enzymes-acute hepatitis panel was negative. Mild elevation, slightly worse today  · History of left sided breast cancer s/p bilateral mastectomy in 2019, chemo/xrt-aromasin restarted per oncology  · History of pericardial mass which resolved with chemotherapy-not seen on TTE this admission  · History of CVA  · Essential hypertension-well controlled  · therapeutic lovenox for DVT prophylaxis.  · pepcid for gi ppx with steroids  · Full code.  · Discussed with patient and nursing staff.  · Anticipate discharge home timing yet to be determined.      Santhosh Miller,  MD [facility]  06/29/22  13:02 EDT    I wore protective equipment throughout this patient encounter including a face mask, gloves and protective eyewear.  Hand hygiene was performed before donning protective equipment and after removal when leaving the room.        Electronically signed by Santhosh Miller MD at 06/29/22 1311     Danelle Cespedes MD PhD at 06/29/22 1248           LOS: 0 days   Patient Care Team:  Elie Dixon MD as PCP - General (Hematology and Oncology)  Joby Barron Jr., MD as Referring Physician (General Surgery)  Elie Dixon MD as Consulting Physician (Hematology and Oncology)  Pedro Marmolejo MD as Consulting Physician (Radiation Oncology)    Chief Complaint: Bilateral arm edema and tenderness with both hands and also ankles..    Interval History:  Patient was seen by dermatologist Dr. Parks on 6/27/2022.  He thinks patient has gouty arthritis versus rheumatoid arthritis versus infection.    Patient was seen by ID service Dr Grossman 6/28/2022, no evidence for bacterial infection.     6/29   Patient remains afebrile and vitally stable.  However unfortunately the left ankle aspiration grew out positive for streptococcal beta-hemolytic group C.  Prednisone was discontinued and restarted back on IV antibiotics today.  Patient also complains of pain in the left hip area.    A comprehensive 14 point review of systems was performed and was negative except as mentioned.    Vital Signs:  Temp:  [98.2 °F (36.8 °C)-99.5 °F (37.5 °C)] 98.2 °F (36.8 °C)  Heart Rate:  [] (P) 99  Resp:  [17-18] 18  BP: (107-127)/(72-92) 125/86    Intake/Output Summary (Last 24 hours) at 6/29/2022 1248  Last data filed at 6/29/2022 1116  Gross per 24 hour   Intake 220 ml   Output 2150 ml   Net -1930 ml       Physical Exam:  GENERAL:  Well-developed, well-nourished in no acute distress.  Orientated to time place and the people.  SKIN:  Warm.  Skin rashes in neck and upper chest.  Also  bilateral arm skin erythematous changes.   HEENT:  Normocephalic.  LYMPHATICS:  No cervical adenopathy.  CHEST: Normal respiratory effort.  Lungs clear to auscultation. Good airflow.  CARDIAC: Irregular rhythm and rate controlled.  Normal S1,S2.  ABDOMEN:  Soft, nontender.  Bowel sounds normal.  EXTREMITIES: Bilateral arm edema involving also both hands with significant MCP joints and tenderness on both hands.  Left ankle mild edema.  NEUROLOGICAL:  Grossly intact.    PSYCHIATRIC:  Normal affect and mood.        Results Review:   Results from last 7 days   Lab Units 06/28/22  0503 06/27/22  0733   WBC 10*3/mm3 7.75 8.06   HEMOGLOBIN g/dL 12.0 12.7   HEMATOCRIT % 35.1 37.7   PLATELETS 10*3/mm3 166 144   MONOCYTES % %  --  3.6*       Lab Results   Component Value Date    NEUTROABS 7.48 (H) 06/27/2022     Results from last 7 days   Lab Units 06/29/22  0530 06/28/22  0502 06/27/22  0733   SODIUM mmol/L 135* 132* 133*   POTASSIUM mmol/L 4.3 3.4* 3.5   CHLORIDE mmol/L 101 96* 98   CO2 mmol/L 23.6 20.0* 20.4*   BUN mg/dL 17 18 23   CREATININE mg/dL 0.68 0.76 0.97   CALCIUM mg/dL 9.2 9.0 9.1   BILIRUBIN mg/dL 0.4 0.5 0.7   ALK PHOS U/L 151* 142* 154*   ALT (SGPT) U/L 51* 46* 71*   AST (SGOT) U/L 49* 29 42*   GLUCOSE mg/dL 149* 106* 131*     Lab Results   Component Value Date    URICACID 5.4 06/28/2022     Lab Results   Component Value Date    CRP 40.24 (H) 06/27/2022       Results from last 7 days   Lab Units 06/27/22  0733   TROPONIN T ng/mL <0.010                       I reviewed the patient's new clinical results.        Assessment:    1.  Left breast cancer, locally advanced, status post neoadjuvant chemotherapy with AC plus T finished in July 2019, followed by bilateral mastectomy and lymph node dissection in September 2019.  Pathology evaluation reported no residual disease.  She subsequently received a adjuvant reading therapy.  She also was started on adjuvant endocrine therapy with Femara in August 2019.  Patient  later developed localized metastatic disease, and was started on Kisqali in conjunction with the letrozole/Femara.  Because of elevated liver function panel, Kisqali was discontinued after 1 month.  Eventually  letrozole/Femara.was also discontinued in December 2021.  She was started on Aromasin in late January 2022.  Patient was recently seen by Dr. Dixon on 5/21/2022, with no evidence for metastatic disease.  · On 6/28/2022, her skin rashes and arthritis has nothing to do with endocrine therapy.  Will resume exemestane 25 mg daily.     2.  Bilateral arm edema, arthralgia involving bilateral MCP joints and skin rashes.  · Venous Doppler study earlier today was negative for any venous thrombosis.   · I think this patient is to have dermatology evaluation.  Patient denies any new medication, no exposure to direct sunshine for extended time.  · Patient was seen by dermatologist  6/27/2022 for recommended blood culture uric acid in the collagen vascular labs as well as consultation for orthopedic surgery to drain the left ankle for C&S.  · 6/28/2022, patient had a normal uric acid 5.4.  So unlikely gout/gouty arthritis.  Patient was seen by ID service Dr. Grossman who does not think patient has infection and stopped IV antibiotics.   · I discussed with patient today on 6/28/2022, recommended to start prednisone 30 mg daily for 3 days, and then can be tapered gradually and then she needs to see rheumatologist as outpatient.  I explained to patient the side effects with insomnia, agitation, increased appetite.  · Prednisone was discontinued on 6/29/2022 because of positive bacterial growth from the left ankle aspiration     3.  Ankle edema and tender.  Patient is waiting for venous Doppler study for further assessment.   · Venous Doppler study on 6/27/2022 was negative for DVT in the lower extremities.  So her arthralgia is likely part of the rheumatoid arthritis.  · However left ankle aspiration growth  positive streptococcal beta hemolytic group C, reported at 10 AM today on 2022.  Patient was started back on IV Rocephin per ID service.     4.  New onset atrial fibrillation.  Patient is on beta-blocker.  She is on Lovenox anticoagulation.  Patient will follow by cardiology service.        PLAN:  8. Continue IV Rocephin, followed by ID service.    9. Oral prednisone has been discontinued today.    10. Continue follow-up by orthopedic surgery.  Patient also complains of worsening pain in the left hip recently where she had hip replacement.  Wondering whether she also has infection there.   11. Continue exemestane 25 mg daily.  12. Continue Pepcid 20 mg twice a day for GI prophylaxis.   13. Continue Lovenox anticoagulation for now.  Outpatient anticoagulation management per cardiology.   14. Management of atrial fibrillation per cardiology service and primary team.   15. Patient has follow-up appointment with Dr. Dixon on 2022.  We will keep that appointment.     I reviewed the notes from ID service and the cardiology service.      Discussed with the patient at bedside.     Will sign off.  Please call if you have questions.       Danelle Cespedes MD PhD  22  12:48 EDT        Electronically signed by Danelle Cespedes MD PhD at 22 1303     Pema Ledesma APRN at 22 0959          [facility] Progress Note       Encounter Date:22  Patient:Marylu Georges  :1960  MRN:[id number]      Chief Complaint: PAF, diastolic heart failure      Subjective:   Subjective   Conversant, asymptomatic with elevated HR    Medications:  Scheduled Meds:  clobetasol, 1 application, Topical, Q12H  enoxaparin, 1 mg/kg, Subcutaneous, Q12H  exemestane, 25 mg, Oral, Daily  famotidine, 20 mg, Oral, BID AC  hydrOXYzine pamoate, 25 mg, Oral, TID  lidocaine, 1 patch, Transdermal, Q24H  metoprolol tartrate, 25 mg, Oral, Q8H  predniSONE, 30 mg, Oral, Daily With Breakfast  sodium  chloride, 10 mL, Intravenous, Q12H    Continuous Infusions:   PRN Meds:  •  senna-docusate sodium **AND** polyethylene glycol **AND** bisacodyl **AND** bisacodyl  •  HYDROcodone-acetaminophen  •  nitroglycerin  •  ondansetron **OR** ondansetron  •  [COMPLETED] Insert peripheral IV **AND** sodium chloride  •  sodium chloride  •  traZODone        Objective:   Objective    Vitals:    06/28/22 2338 06/29/22 0409 06/29/22 0738 06/29/22 0853   BP: 126/88 114/86 125/86    BP Location: Right arm  Right arm    Patient Position: Lying  Lying    Pulse: 95 96 102 104   Resp: 18  18    Temp: 98.6 °F (37 °C)  98.2 °F (36.8 °C)    TempSrc: Oral  Oral    SpO2: 95%  94%    Weight:       Height:               Physical Exam:  Constitutional: Well appearing, well developed, no acute distress   HENT: Oropharynx clear and membrane moist  Eyes: Normal conjunctiva, no sclera icterus.  Neck: Supple  Cardiovascular: Irregularly irregular rate and rhythm, No Murmur, No bilateral lower extremity edema.  Pulmonary: Normal respiratory effort, normal lung sounds, no wheezing.  Abdominal: Soft, nontender, no hepatosplenomegaly, liver is non-pulsatile.  Neurological: Alert and orient x 3.   Skin: Warm, dry, no ecchymosis, no rash. Arm and bilateral ankle swelling at joints  Psych: Appropriate mood and affect. Normal judgment and insight.           Lab Review:   Results from last 7 days   Lab Units 06/29/22  0530 06/28/22  0502 06/27/22  0733   SODIUM mmol/L 135* 132* 133*   POTASSIUM mmol/L 4.3 3.4* 3.5   CHLORIDE mmol/L 101 96* 98   CO2 mmol/L 23.6 20.0* 20.4*   BUN mg/dL 17 18 23   CREATININE mg/dL 0.68 0.76 0.97   GLUCOSE mg/dL 149* 106* 131*   CALCIUM mg/dL 9.2 9.0 9.1   AST (SGOT) U/L 49* 29 42*   ALT (SGPT) U/L 51* 46* 71*     Results from last 7 days   Lab Units 06/27/22  0733   TROPONIN T ng/mL <0.010     Results from last 7 days   Lab Units 06/28/22  0503 06/27/22  0733   WBC 10*3/mm3 7.75 8.06   HEMOGLOBIN g/dL 12.0 12.7   HEMATOCRIT %  35.1 37.7   PLATELETS 10*3/mm3 166 144                   Invalid input(s): LDLCALC  Results from last 7 days   Lab Units 06/27/22  0733   PROBNP pg/mL 2,048.0*     Results from last 7 days   Lab Units 06/27/22  0733   TSH uIU/mL 1.820           Assessment:   Assessment       Diagnosis Plan   1. Atrial fibrillation, unspecified type (HCC)     2. Elevated LFTs     3. Elevated C-reactive protein (CRP)     4. Elevated sed rate     5. Edema of both upper extremities           Plan:   Plan    New onset atrial fibrillation / Hx of PAT:  - on oral metoprolol tartrate and received IV digoxin 250 mcg x1 yesterday  - HR 100s this AM. /70  - anticoagulated with Lovenox  - will give 250mcg IV digoxin x1 and start PO tomorrow    Diastolic heart failure:  - echo 6/27 with preserved LVEF 55%    Arm swelling:  - negative doppler study    Cellulitis  - ID following    PFO:  - small, noted on echo yesterday    Metastatic breast cancer  - prior bilateral mastectomy 2019, chemo/xrt restarted per oncology    New atrial fibrillation, rate remains 100s. Wiill repeat IV digoxin and start oral.  Continue low dose BB. When no invasive procedures planned can transition to oral anticoagulation.      Pema Ledesma, [facility]  06/29/22  09:59 EDT      Electronically signed by Pema Ledesma APRN at 06/29/22 1028     Juan Grossman MD at 06/29/22 0930        ID note for cellulitis?  S: joints are some better. She is staying on top of her pain meds. No f/c/ns  O: Left ankle less tender and swollen, still with significant left upper extremity swelling especially near the elbow and arm with some serous drainage from the elbow area.  Her bilateral hand show decreased swelling    Creatinine 0.68  Blood cultures no growth to date  6/28 left ankle arthrocentesis culture no growth (red blood cells 200,000, white blood cells 57,400, negative crystals)    A/p  1.  Polyarticular arthritis flare with positive  RF  2.  Rash  3.  Elevated procalcitonin, likely due to severely elevated CRP  4.  AFib, heart rate improved    Started on prednisone and seems some better.  Arthrocentesis yesterday showed no crystals and suspect inflammatory arthropathy, possible autoimmune disease such as RA. Hold abx.  F/u pending autoimmune labs    ADDENDUM: Fascinatingly, cultures from ankle now growing group C strep.  Very atypical for bacterial septic arthritis to present in multiple joints outside of endocarditis or high grade bacteremia, but here we are.  We are going to start her on rocephin 2g iv q24 and ask ortho to eval.  TTE was negative for vegetation and bcx are negative to date. Hold on additional steroids    Electronically signed by Juan Grossman MD at 22 1015     Santhosh Miller MD at 22 1527              Name: Marylu Georges ADMIT: 2022   : 1960  PCP: Elie Dixon MD    MRN: [id number] LOS: 0 days   AGE/SEX: 62 y.o. female  ROOM: [facility]     Subjective   Subjective     She reports that the pain medication is effective, but not lasting long enough. She was dizzy this morning with soft bp and so her metoprolol has been decreased which seems to have resolved this. She reports that the swelling in her bilateral hands has improved somewhat. She went for arthrocentesis of the left ankle this afternoon.      Objective   Objective   Vital Signs  Temp:  [98.1 °F (36.7 °C)-99.2 °F (37.3 °C)] 98.6 °F (37 °C)  Heart Rate:  [105-128] 106  Resp:  [16-17] 17  BP: (108-127)/(69-84) 127/73  SpO2:  [90 %-96 %] 95 %  on   ;   Device (Oxygen Therapy): room air  Body mass index is 25.02 kg/m².  Physical Exam  Constitutional:       General: She is not in acute distress.     Appearance: She is not ill-appearing.   Cardiovascular:      Rate and Rhythm: Tachycardia present. Rhythm irregular.      Heart sounds: Normal heart sounds.   Pulmonary:      Effort: Pulmonary effort is normal.      Breath sounds: Normal  breath sounds.   Abdominal:      General: Bowel sounds are normal.      Palpations: Abdomen is soft.   Musculoskeletal:         General: Swelling and tenderness present.      Right lower leg: No edema.      Left lower leg: Edema present.   Skin:     Findings: Erythema and rash present.      Comments: Bilateral upper extremity swelling on both arms to her hands bilaterally, worse on the left, with blotchy erythema and warmth and tenderness   Neurological:      Mental Status: She is alert.   Psychiatric:         Mood and Affect: Mood normal.         Behavior: Behavior normal.         Results Review     I reviewed the patient's new clinical results.  Results from last 7 days   Lab Units 06/28/22  0503 06/27/22  0733   WBC 10*3/mm3 7.75 8.06   HEMOGLOBIN g/dL 12.0 12.7   PLATELETS 10*3/mm3 166 144     Results from last 7 days   Lab Units 06/28/22  0502 06/27/22  0733   SODIUM mmol/L 132* 133*   POTASSIUM mmol/L 3.4* 3.5   CHLORIDE mmol/L 96* 98   CO2 mmol/L 20.0* 20.4*   BUN mg/dL 18 23   CREATININE mg/dL 0.76 0.97   GLUCOSE mg/dL 106* 131*   Estimated Creatinine Clearance: 83 mL/min (by C-G formula based on SCr of 0.76 mg/dL).  Results from last 7 days   Lab Units 06/28/22  0502 06/27/22  0733   ALBUMIN g/dL 3.00* 3.40*   BILIRUBIN mg/dL 0.5 0.7   ALK PHOS U/L 142* 154*   AST (SGOT) U/L 29 42*   ALT (SGPT) U/L 46* 71*     Results from last 7 days   Lab Units 06/28/22  0502 06/27/22  0733   CALCIUM mg/dL 9.0 9.1   ALBUMIN g/dL 3.00* 3.40*     Results from last 7 days   Lab Units 06/28/22  0502   PROCALCITONIN ng/mL 0.64*     COVID19   Date Value Ref Range Status   06/27/2022 Not Detected Not Detected - Ref. Range Final   07/17/2021 Not Detected Not Detected - Ref. Range Final     No results found for: HGBA1C, POCGLU    FL Guided Aspiration Joint  FLUOROSCOPIC GUIDED ASPIRATION OF LEFT ANKLE 06/28/2022     HISTORY: Left ankle swelling.     After signed informed consent was obtained the anterior medial aspect of  the left  ankle was prepped and draped in the usual sterile fashion.  Lidocaine was used for local anesthesia.     18-gauge needle was introduced into the medial tibiotalar joint.  Approximately 2 cc of slightly cloudy and bloody fluid was aspirated.  Confirmatory images were obtained.     Fluid sample was sent to the laboratory for evaluation.     Patient tolerated the procedure well with no complications. Confirmatory  images were obtained.        FLUOROSCOPY TIME: 11 seconds, 1 images.     This report was finalized on 6/28/2022 3:08 PM by Dr. Anatoliy Hughes M.D.       Scheduled Medications  clobetasol, 1 application, Topical, Q12H  enoxaparin, 1 mg/kg, Subcutaneous, Q12H  exemestane, 25 mg, Oral, Daily  famotidine, 20 mg, Oral, BID AC  hydrOXYzine pamoate, 25 mg, Oral, TID  metoprolol tartrate, 25 mg, Oral, Q8H  predniSONE, 30 mg, Oral, Daily With Breakfast  sodium chloride, 10 mL, Intravenous, Q12H    Infusions   Diet  Diet Regular; Cardiac, Consistent Carbohydrate      Assessment/Plan     Active Hospital Problems    Diagnosis  POA   • **Atrial fibrillation (HCC) [I48.91]  Yes   • Inflammatory arthritis [M19.90]  Yes   • Transaminitis [R74.01]  Yes   • Lymphedema of upper extremity, bilateral [I89.0]  Yes   • Rash [R21]  Yes   • Malignant neoplasm of overlapping sites of left breast in female, estrogen receptor positive (HCC) [C50.812, Z17.0]  Not Applicable      Resolved Hospital Problems   No resolved problems to display.       62 y.o. female admitted with Atrial fibrillation (HCC).    · New onset afib with rvr-tsh was normal. Echocardiogram with patent foramen ovale and severely dilated left atrial cavity. Continue lovenox, digoxin, and metoprolol per cardiology recommendations  · Polyarticular Inflammatory arthritis with positive RF and urticarial rash-greatly appreciate all consults advice and assistance. Arthrocentesis performed this afternoon to the left ankle with 2 cc removed. Studies pending. Antibiotics  have been discontinued and steroids initiated. Hydroxyzine for urticarial eruption. Will need to see a rheumatologist in the outpatient setting.   · Elevated liver enzymes-acute hepatitis panel was negative. LFTs are improved today and near normal.  · History of left sided breast cancer s/p bilateral mastectomy in 2019, chemo/xrt-aromasin restarted per oncology  · History of pericardial mass which resolved with chemotherapy-not seen on TTE this admission  · History of CVA  · Essential hypertension-well controlled  · therapeutic lovenox for DVT prophylaxis.  · pepcid for gi ppx with steroids  · Full code.  · Discussed with patient and nursing staff.  · Anticipate discharge home timing yet to be determined.      Santhosh Miller MD  [facility]  06/28/22  15:27 EDT    I wore protective equipment throughout this patient encounter including a face mask, gloves and protective eyewear.  Hand hygiene was performed before donning protective equipment and after removal when leaving the room.        Electronically signed by Santhosh Miller MD at 06/28/22 1541          Consult Notes (last 72 hours)      Kenneth Tran MD at 06/29/22 1241      Consult Orders    1. Inpatient Orthopedic Surgery Consult [[id number]] ordered by Juan Grossman MD at 06/29/22 1014                  Orthopedic Consult      Patient: Marylu Georges    Date of Admission: 6/27/2022  6:51 AM    YOB: 1960    Medical Record Number: [id number]    Consulting Physician: Santhosh Miller MD    Chief Complaints: Left ankle pain and swelling    History of Present Illness: 62 y.o. female admitted to [facility] [place] to services of Santhosh Miller MD with left ankle pain and swelling.  Patient states symptoms began 5 days ago.  She has difficulty moving her ankle she has a history of several ankle surgeries which she required fracture fixation with plate and screws but implants were removed this was in her 20s.  She then  developed on this admission left hip, bilateral wrist, left shoulder pain as well as some swelling of her left arm as well as some urticaria.  Infectious disease had saw her and felt this was polyarthralgia flareup.  They did aspirate the left ankle joint to rule out gout however it did come back culture positive with streptococcal growth.  Orthopedics was consulted.  Patient was seen examined she states her hips are feeling better she is able to bend her left hip up and move around which is improved.  She states wrist and hand feel better she still has some elbow pain and shoulder pain.  She is able to move her elbow around but has difficulty lifting her left arm.  She continues to have some left ankle pain and swelling and difficulty weightbearing on it.  No other complaints    Allergies:   Allergies   Allergen Reactions   • Benadryl [Diphenhydramine] Itching     INCREASES BLOOD PRESSURE   • Erythromycin GI Intolerance   • Levaquin [Levofloxacin] GI Intolerance   • Penicillins GI Intolerance       Home Medications:    Current Facility-Administered Medications:   •  sennosides-docusate (PERICOLACE) 8.6-50 MG per tablet 2 tablet, 2 tablet, Oral, BID PRN **AND** polyethylene glycol (MIRALAX) packet 17 g, 17 g, Oral, Daily PRN **AND** bisacodyl (DULCOLAX) EC tablet 5 mg, 5 mg, Oral, Daily PRN **AND** bisacodyl (DULCOLAX) suppository 10 mg, 10 mg, Rectal, Daily PRN, Jesus Ibrahim APRN  •  cefTRIAXone (ROCEPHIN) 2 g in sodium chloride 0.9 % 100 mL IVPB-VTB, 2 g, Intravenous, Q24H, Juan Grossman MD, Last Rate: 200 mL/hr at 06/29/22 1116, 2 g at 06/29/22 1116  •  clobetasol (TEMOVATE) 0.05 % cream 1 application, 1 application, Topical, Q12H, West Rodriguez MD, 1 application at 06/29/22 0853  •  [START ON 6/30/2022] digoxin (LANOXIN) tablet 125 mcg, 125 mcg, Oral, Daily, Pema Ledesma, APRN  •  Enoxaparin Sodium (LOVENOX) syringe 70 mg, 1 mg/kg, Subcutaneous, Q12H, Jesus Ibrahim APRN, 70 mg  at 06/29/22 1237  •  exemestane (AROMASIN) chemo tablet 25 mg, 25 mg, Oral, Daily, Danelle Cespedes MD PhD, 25 mg at 06/29/22 0853  •  famotidine (PEPCID) tablet 20 mg, 20 mg, Oral, BID AC, Danelle Cespedes MD PhD, 20 mg at 06/29/22 0643  •  HYDROcodone-acetaminophen (NORCO) 5-325 MG per tablet 1 tablet, 1 tablet, Oral, Q4H PRN, Santhosh Miller MD, 1 tablet at 06/29/22 1116  •  hydrOXYzine pamoate (VISTARIL) capsule 25 mg, 25 mg, Oral, TID, West Rodriguez MD, 25 mg at 06/29/22 0853  •  lidocaine (LIDODERM) 5 % 1 patch, 1 patch, Transdermal, Q24H, Santhosh Miller MD, 1 patch at 06/28/22 2148  •  metoprolol tartrate (LOPRESSOR) tablet 25 mg, 25 mg, Oral, Q8H, Danni Odell MD, 25 mg at 06/29/22 0853  •  nitroglycerin (NITROSTAT) SL tablet 0.4 mg, 0.4 mg, Sublingual, Q5 Min PRN, Jesus Ibrahim APRN  •  ondansetron (ZOFRAN) tablet 4 mg, 4 mg, Oral, Q6H PRN **OR** ondansetron (ZOFRAN) injection 4 mg, 4 mg, Intravenous, Q6H PRN, Jesus Ibrahim APRN  •  [COMPLETED] Insert peripheral IV, , , Once **AND** sodium chloride 0.9 % flush 10 mL, 10 mL, Intravenous, PRN, Alvino Jauregui MD  •  sodium chloride 0.9 % flush 10 mL, 10 mL, Intravenous, Q12H, Jesus Ibrahim APRN, 10 mL at 06/29/22 0855  •  sodium chloride 0.9 % flush 10 mL, 10 mL, Intravenous, PRN, Jesus Ibrahim APRN  •  traZODone (DESYREL) tablet 50 mg, 50 mg, Oral, Nightly PRN, Danelle Cespedes MD PhD    Facility-Administered Medications Ordered in Other Encounters:   •  Chlorhexidine Gluconate Cloth 2 % pads 1 each, 1 each, Apply externally, Take As Directed, Luis M Leonard MD    Current Medications:  Scheduled Meds:cefTRIAXone, 2 g, Intravenous, Q24H  clobetasol, 1 application, Topical, Q12H  [START ON 6/30/2022] digoxin, 125 mcg, Oral, Daily  enoxaparin, 1 mg/kg, Subcutaneous, Q12H  exemestane, 25 mg, Oral, Daily  famotidine, 20 mg, Oral, BID AC  hydrOXYzine pamoate, 25 mg, Oral, TID  lidocaine, 1 patch, Transdermal, Q24H  metoprolol tartrate, 25  mg, Oral, Q8H  sodium chloride, 10 mL, Intravenous, Q12H      Continuous Infusions:   PRN Meds:.•  senna-docusate sodium **AND** polyethylene glycol **AND** bisacodyl **AND** bisacodyl  •  HYDROcodone-acetaminophen  •  nitroglycerin  •  ondansetron **OR** ondansetron  •  [COMPLETED] Insert peripheral IV **AND** sodium chloride  •  sodium chloride  •  traZODone    Past Medical History:   Diagnosis Date   • Anemia in neoplastic disease    • Arthritis    • Breast cancer (HCC)     Left   • CVA (cerebral vascular accident) (HCC)    • Hip pain     RIGHT HIP... CYST   • History of fracture of leg 1987   • History of radiation therapy     LAST TREATMENT     • Hypertension    • Limited joint range of motion (ROM)     RIGHT HIP   • Skin sore     OPEN SORE LEFT BREAST   • Syncope    • Vertigo        Past Surgical History:   Procedure Laterality Date   • AXILLARY LYMPH NODE BIOPSY/EXCISION Right     LYMPH NODE UNDER RIGHT ARM-MALIGNANT (DOUBLE MASTECTOMY)   • BREAST BIOPSY Left     MALIGNANT   • FRACTURE SURGERY  1987    Leg   • HARDWARE REMOVAL     • MASTECTOMY W/ SENTINEL NODE BIOPSY Bilateral 9/16/2019    Procedure: BILATERLA MODIFIED RADICAL MASTECTOMY WITH BILATERAL SENTINEL LYMPH NODE BIOPSY;  Surgeon: Joby Barron Jr., MD;  Location: [facility];  Service: General   • TOTAL HIP ARTHROPLASTY Right 3/2/2020    Procedure: RIGHT TOTAL HIP ARTHROPLASTY NATALY NAVIGATION;  Surgeon: Luis M Leonard MD;  Location: [facility] OR;  Service: Orthopedics;  Laterality: Right;   • TOTAL HIP ARTHROPLASTY Left 7/20/2021    Procedure: Posterior LEFT TOTAL HIP ARTHROPLASTY NATALY NAVIGATION;  Surgeon: Luis M Leonard MD;  Location: [facility] OR;  Service: Orthopedics;  Laterality: Left;   • VENOUS ACCESS DEVICE (PORT) INSERTION Right 2/1/2019    Procedure: INSERTION VENOUS ACCESS DEVICE;  Surgeon: Joby Barron Jr., MD;  Location: [facility];  Service: General   • VENOUS ACCESS DEVICE (PORT) REMOVAL N/A 10/30/2019     Procedure: Mediport Removal;  Surgeon: Joby Barron Jr., MD;  Location: [facility];  Service: General       Social History     Occupational History   • Occupation:      Employer: SELF-EMPLOYED   Tobacco Use   • Smoking status: Never Smoker   • Smokeless tobacco: Never Used   Vaping Use   • Vaping Use: Never used   Substance and Sexual Activity   • Alcohol use: Not Currently     Alcohol/week: 0.0 standard drinks     Comment: 2 CUPS DAILY   • Drug use: No   • Sexual activity: Not Currently     Partners: Male     Birth control/protection: Abstinence      Social History     Social History Narrative   • Not on file       Family History   Problem Relation Age of Onset   • Lung cancer Father    • Irritable bowel syndrome Father    • Cancer Mother    • Breast cancer Mother    • Liver disease Mother    • Breast cancer Sister 48   • Breast cancer Maternal Grandmother    • Breast cancer Paternal Grandmother    • Breast cancer Maternal Uncle    • Malig Hyperthermia Neg Hx        Review of Systems:     Constitutional:  Denies fever, shaking or chills         All other pertinent positives and negatives as noted above in HPI.    Physical Exam: 62 y.o. female    Vitals:    06/29/22 0738 06/29/22 0853 06/29/22 1236 06/29/22 1244   BP: 125/86  120/87 121/78   BP Location: Right arm      Patient Position: Lying      Pulse: 102 104 99 95   Resp: 18      Temp: 98.2 °F (36.8 °C)      TempSrc: Oral      SpO2: 94%      Weight:       Height:         General:  Awake, alert. No acute distress.          Extremities:      Left lower extremity ankle positive tender to palpation positive swelling no redness noted.  Limited motion due to pain.  She can wiggle her toes sensation tact distally.  Calf soft and compressible.  2+ pedal pulses.  Previous anterior and medial incisions appear well-healed.  Patient is able to range her hip and states pain she felt previously has improved and patient states her motion is much improved  today.      Upper extremity some positive tenderness to the direct superior aspect of the shoulder over the AC joint and clavicle area.  No overt tenderness when squeezing anterior posteriorly about the joint.  Shoulder range of motion is limited due to pain however she can have it moved passively which does not seem to cause her much discomfort.  The skin is intact no overt swelling about the shoulder or soft tissues of the shoulder is noted no redness.  Distally there is some generalized lower arm and upper forearm swelling and about the elbow skin again appears benign no overt redness there is some welts and serous type superficial drainage but no open wounds noted.  The patient can actively and passively range her elbow fully without much discomfort.  Motor and sensory is intact distally.  Compartment soft and compressible.    Some swelling to right upper extremity more significant on the left as noted above.      Diagnostic Tests:    No results displayed because visit has over 200 results.        Lab Results (last 24 hours)     Procedure Component Value Units Date/Time    Blood Culture - Blood, Arm, Right [[id number]]  (Normal) Collected: 06/28/22 0957    Specimen: Blood from Arm, Right Updated: 06/29/22 1033     Blood Culture No growth at 24 hours    Body Fluid Culture - Body Fluid, Ankle, Left [[id number]]  (Abnormal) Collected: 06/28/22 1341    Specimen: Body Fluid from Ankle, Left Updated: 06/29/22 1000     Body Fluid Culture Light growth (2+) Streptococcus, Beta Hemolytic, Group C     Comment: This organism is considered to be universally susceptible to penicillin.          Gram Stain Many (4+) WBCs seen      No organisms seen    Narrative:      6/29: Additional sensitivities to follow, please allow 48 hours for these results.    Comprehensive Metabolic Panel [[id number]]  (Abnormal) Collected: 06/29/22 0530    Specimen: Blood Updated: 06/29/22 0658     Glucose 149 mg/dL      BUN 17 mg/dL      Creatinine 0.68  mg/dL      Sodium 135 mmol/L      Potassium 4.3 mmol/L      Chloride 101 mmol/L      CO2 23.6 mmol/L      Calcium 9.2 mg/dL      Total Protein 6.4 g/dL      Albumin 2.90 g/dL      ALT (SGPT) 51 U/L      AST (SGOT) 49 U/L      Alkaline Phosphatase 151 U/L      Total Bilirubin 0.4 mg/dL      Globulin 3.5 gm/dL      A/G Ratio 0.8 g/dL      BUN/Creatinine Ratio 25.0     Anion Gap 10.4 mmol/L      eGFR 97.4 mL/min/1.73      Comment: National Kidney Foundation and American Society of Nephrology (ASN) Task Force recommended calculation based on the Chronic Kidney Disease Epidemiology Collaboration (CKD-EPI) equation refit without adjustment for race.       Narrative:      GFR Normal >60  Chronic Kidney Disease <60  Kidney Failure <15      Blood Culture - Blood, Hand, Left [[id number]]  (Normal) Collected: 06/28/22 0502    Specimen: Blood from Hand, Left Updated: 06/29/22 0548     Blood Culture No growth at 24 hours    Antistreptolysin O Titer [[id number]] Collected: 06/28/22 0502    Specimen: Blood Updated: 06/29/22 0408     ASO 22.7 IU/mL     Narrative:      Performed at:  [address] [place] [address]  [id number]  : Nicholas Warner PhD, Phone:  [phone]    Crystal Exam, Fluid - Synovial Fluid, [[id number]] Collected: 06/28/22 1341    Specimen: Synovial Fluid Updated: 06/28/22 1640     Crystals, Fluid No crystals seen    Body Fluid Cell Count With Differential - Body Fluid, Ankle, Left [[id number]]  (Abnormal) Collected: 06/28/22 1341    Specimen: Body Fluid from Ankle, Left Updated: 06/28/22 1631    Narrative:      The following orders were created for panel order Body Fluid Cell Count With Differential - Body Fluid, Ankle, Left.  Procedure                               Abnormality         Status                     ---------                               -----------         ------                     Body fluid cell count - ...[[id number]]  Abnormal            Final result                Body fluid differential ...[[id number]]                      Final result                 Please view results for these tests on the individual orders.    Body fluid differential - Body Fluid, Ankle, Left [[id number]] Collected: 06/28/22 1341    Specimen: Body Fluid from Ankle, Left Updated: 06/28/22 1631     Neutrophils, Fluid 85 %      Lymphocytes, Fluid 6 %      Monocytes, Fluid 9 %     Body fluid cell count - Body Fluid, Ankle, Left [[id number]]  (Abnormal) Collected: 06/28/22 1341    Specimen: Body Fluid from Ankle, Left Updated: 06/28/22 1616     Color, Fluid Red     Appearance, Fluid Cloudy     RBC, Fluid 200,000 /mm3      Nucleated Cells, Fluid 57,400 /mm3      Method: Automated Sysmex XN Method    Narrative:      No reference range established. Physician to interpret results with clinical findings.          Imaging: Upper and lower left extremity duplex Dopplers were done and are negative for acute blood clots    Fluoroscopy guided aspiration left ankle was performed    Lab results showed culture positive for strep to coccal light growth of the left ankle joint fluid. And elevated nucleated cell count with high percentage neutrophils    Assessment: Septic left ankle joint, polyarthralgia multiple extremities including shoulder and elbow on the left as well as possible rotator cuff tendinitis.    Plan: Discussed the findings with the patient and also discussed with infectious disease.  Because there is a positive culture and continued left ankle pain and swelling I think surgical intervention is warranted consisting of left ankle joint irrigation and debridement.  Patient had just eaten lunch we will plan to get the case request sent and scheduled for OR tomorrow.  Regards to left upper extremity she has been in a dependent position for several days I did place a pillow under the elbow and arm and had an elevated for her throughout our conversation she reports that hand wrist and swelling felt better.  She is  able to move the elbow quite well and shoulder does not seem overly painful with motion or palpation.  I think is worth imaging the upper extremity and infectious disease had agreed they will plan to put in an MRI.  I did discuss surgical intervention with the patient including risk and benefits.  I did discuss risk and benefits with the patient with risk including but not limited to bleeding, infection, damage nearby nerves, vessels, tendons, ligaments, continued pain, worsening pain, fracture, dislocation, leg length discrepancy, blood clots, even death with anesthesia and possible need for future procedures surgeries.  Patient understood this and has chosen to proceed.      If additional imaging or studies are warranted we will order as necessary continue to monitor the patient.  I recommended that she continued to keep the left upper extremity elevated and place some ice as well as with start work on some motion.  We will also see what the MRI results show.  We will plan for surgical intervention of her left ankle tomorrow.    All questions were answered.  Patient understands and agrees the plan.    Please call with any questions or concerns thank you    Date: 6/29/2022    Kenneth Tran MD    CC: Santhosh Miller MD          Electronically signed by Kenneth Tran MD at 06/30/22 1046

## 2022-07-01 NOTE — CONSULTS
"Orthopedic Consult      Patient: Marylu Georges    Date of Admission: 6/27/2022  6:51 AM    YOB: 1960    Medical Record Number: [id number]    Attending Physician: Santhosh Miller MD    Consulting Physician: Juan Grossman MD    Chief Complaints: Back pain      History of Present Illness: 62 y.o. female admitted to [facility], operated by [facility] to services of Santhosh Miller MD on 6/27/2022 with complaints of left ankle and bilateral upper extremity swelling with rash.  She reports a transient history of upper extremity lymphedema following bilateral mastectomy for cancer in 2019, but none since.  She also reports a chronic rash to her chest, but the rash that developed over the upper extremities was new.  The swelling and rash started Friday, got worse over the next 24 hours so she presented to the emergency room.  She denies any recent injury or trauma.  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  She denies any real chronic back pain, other than typical age-related back pain.    Allergies:   Allergies   Allergen Reactions   • Benadryl [Diphenhydramine] Itching     INCREASES BLOOD PRESSURE   • Erythromycin GI Intolerance   • Levaquin [Levofloxacin] GI Intolerance   • Penicillins GI Intolerance       Medications:   Home Medications:  Current Facility-Administered Medications on File Prior to Encounter   Medication   • Chlorhexidine Gluconate Cloth 2 % pads 1 each     Current Outpatient Medications on File Prior to Encounter   Medication Sig   • acetaminophen (TYLENOL) 650 MG 8 hr tablet Take 1,950 mg by mouth Every 8 (Eight) Hours As Needed.   • Cholecalciferol 250 MCG (61236 UT) tablet Take 1 tablet by mouth. Patient stated dose as \"1500 mg\"   • diclofenac (VOLTAREN) 75 MG EC tablet Take 75 mg by mouth Daily.   • exemestane (AROMASIN) 25 MG chemo tablet Take 1 tablet by mouth Daily.   • metoprolol succinate XL (TOPROL-XL) 25 MG 24 hr tablet Take 1 tablet by mouth Daily. " "  • gabapentin (NEURONTIN) 100 MG capsule TAKE ONE CAPSULE BY MOUTH ONCE NIGHTLY FOR INSOMNIA   • magnesium oxide (MAG-OX) 400 MG tablet Take 400 mg by mouth 2 (two) times a day. Patient stated dose as \"1500mg\"   • meclizine (ANTIVERT) 25 MG tablet Take 1 tablet by mouth 3 (Three) Times a Day As Needed for Dizziness.   • naproxen (NAPROSYN) 500 MG tablet Take 1 tablet by mouth 2 (Two) Times a Day As Needed for Mild Pain .     Current Medications:  Scheduled Meds:cefTRIAXone, 2 g, Intravenous, Q24H  clobetasol, 1 application, Topical, Q12H  digoxin, 125 mcg, Oral, Daily  enoxaparin, 1 mg/kg, Subcutaneous, Q12H  exemestane, 25 mg, Oral, Daily  famotidine, 20 mg, Oral, BID AC  hydrOXYzine pamoate, 25 mg, Oral, TID  lidocaine, 1 patch, Transdermal, Q24H  metoprolol tartrate, 50 mg, Oral, Q12H  sodium chloride, 10 mL, Intravenous, Q12H      Continuous Infusions:lactated ringers, 9 mL/hr, Last Rate: 9 mL/hr (06/30/22 0805)  lactated ringers, 100 mL/hr, Last Rate: 100 mL/hr (06/30/22 1142)      PRN Meds:.•  senna-docusate sodium **AND** polyethylene glycol **AND** bisacodyl **AND** bisacodyl  •  nitroglycerin  •  ondansetron **OR** ondansetron  •  [COMPLETED] Insert peripheral IV **AND** sodium chloride  •  sodium chloride  •  tiZANidine  •  traZODone    Past Medical History:   Diagnosis Date   • Anemia in neoplastic disease    • Arthritis    • Breast cancer (HCC)     Left   • CVA (cerebral vascular accident) (HCC)    • Hip pain     RIGHT HIP... CYST   • History of fracture of leg 1987   • History of radiation therapy     LAST TREATMENT     • Hypertension    • Limited joint range of motion (ROM)     RIGHT HIP   • Skin sore     OPEN SORE LEFT BREAST   • Syncope    • Vertigo      Past Surgical History:   Procedure Laterality Date   • AXILLARY LYMPH NODE BIOPSY/EXCISION Right     LYMPH NODE UNDER RIGHT ARM-MALIGNANT (DOUBLE MASTECTOMY)   • BREAST BIOPSY Left     MALIGNANT   • FRACTURE SURGERY  1987    Leg   • HARDWARE " REMOVAL     • INCISION AND DRAINAGE LEG Left 6/30/2022    Procedure: INCISION AND DRAINAGE left ankle;  Surgeon: Kenneth Tran MD;  Location: [facility] OR;  Service: Orthopedics;  Laterality: Left;   • MASTECTOMY W/ SENTINEL NODE BIOPSY Bilateral 9/16/2019    Procedure: BILATERLA MODIFIED RADICAL MASTECTOMY WITH BILATERAL SENTINEL LYMPH NODE BIOPSY;  Surgeon: Joby Barron Jr., MD;  Location: [facility] OR;  Service: General   • TOTAL HIP ARTHROPLASTY Right 3/2/2020    Procedure: RIGHT TOTAL HIP ARTHROPLASTY NATALY NAVIGATION;  Surgeon: Luis M Leonard MD;  Location: [facility] OR;  Service: Orthopedics;  Laterality: Right;   • TOTAL HIP ARTHROPLASTY Left 7/20/2021    Procedure: Posterior LEFT TOTAL HIP ARTHROPLASTY NATALY NAVIGATION;  Surgeon: Luis M Leonard MD;  Location: [facility];  Service: Orthopedics;  Laterality: Left;   • VENOUS ACCESS DEVICE (PORT) INSERTION Right 2/1/2019    Procedure: INSERTION VENOUS ACCESS DEVICE;  Surgeon: Joby Barron Jr., MD;  Location: [facility];  Service: General   • VENOUS ACCESS DEVICE (PORT) REMOVAL N/A 10/30/2019    Procedure: Mediport Removal;  Surgeon: Joby Barron Jr., MD;  Location: [facility] OR;  Service: General     Social History     Occupational History   • Occupation:      Employer: SELF-EMPLOYED   Tobacco Use   • Smoking status: Never Smoker   • Smokeless tobacco: Never Used   Vaping Use   • Vaping Use: Never used   Substance and Sexual Activity   • Alcohol use: Not Currently     Alcohol/week: 0.0 standard drinks     Comment: 2 CUPS DAILY   • Drug use: No   • Sexual activity: Not Currently     Partners: Male     Birth control/protection: Abstinence      Social History     Social History Narrative   • Not on file     Family History   Problem Relation Age of Onset   • Lung cancer Father    • Irritable bowel syndrome Father    • Cancer Mother    • Breast cancer Mother    • Liver disease Mother    • Breast cancer Sister 48   •  Breast cancer Maternal Grandmother    • Breast cancer Paternal Grandmother    • Breast cancer Maternal Uncle    • Malig Hyperthermia Neg Hx          Review of Systems:     All pertinent positives and negatives as noted above in HPI.    Physical Exam: 62 y.o. female    General:  Awake, alert. No acute distress.      Head/Neck:  Normocephalic, atraumatic.  Conjunctivae and sclerae clear.  Hearing adequate for the exam.  Neck is supple with normal ROM.    Psych:  Affect and demeanor appropriate.    Lungs:  Good chest expansion, breathing unlabored.    Abdomen:  Soft, round, nontender    Extremities:       Moves upper and lower extremities on command.  Strength 5/5 on right dorsi and plantar flexion.  Sensation is fully intact.  Denies saddle anesthesia.  Skin is intact, swelling noted to bilateral upper extremities, particularly at the forearms and hands, also left lower extremity.  No erythema to left lower extremity, no calf tenderness.  Nodules noted to fingers.  Minimal tenderness to palpation of the lower thoracic and entire lumbar spine. No obvious instability although exam is limited due to discomfort.    .       Diagnostic Tests:    No results displayed because visit has over 200 results.        Lab Results (last 24 hours)     Procedure Component Value Units Date/Time    Blood Culture - Blood, Arm, Right [[id number]]  (Normal) Collected: 06/28/22 0957    Specimen: Blood from Arm, Right Updated: 07/01/22 1032     Blood Culture No growth at 3 days    Body Fluid Culture - Aspirate, Shoulder, Left [[id number]] Collected: 06/30/22 1250    Specimen: Aspirate from Shoulder, Left Updated: 07/01/22 0831     Body Fluid Culture No growth     Gram Stain No WBCs or organisms seen    Body Fluid Culture - Body Fluid, Ankle, Left [[id number]]  (Abnormal)  (Susceptibility) Collected: 06/28/22 1341    Specimen: Body Fluid from Ankle, Left Updated: 07/01/22 0624     Body Fluid Culture Light growth (2+) Streptococcus dysgalactiae ssp  equisimilis     Comment: This organism is considered to be universally susceptible to penicillin.          Gram Stain Many (4+) WBCs seen      No organisms seen    Susceptibility      Streptococcus dysgalactiae ssp equisimilis      BRAYD      Ceftriaxone Susceptible      Clindamycin Susceptible      Levofloxacin Susceptible      Penicillin G Susceptible      Vancomycin Susceptible                    Linear View                   Blood Culture - Blood, Hand, Left [[id number]]  (Normal) Collected: 06/28/22 0502    Specimen: Blood from Hand, Left Updated: 07/01/22 0547     Blood Culture No growth at 3 days    Basic Metabolic Panel [[id number]]  (Abnormal) Collected: 07/01/22 0419    Specimen: Blood Updated: 07/01/22 0523     Glucose 98 mg/dL      BUN 17 mg/dL      Creatinine 0.64 mg/dL      Sodium 134 mmol/L      Potassium 4.3 mmol/L      Chloride 98 mmol/L      CO2 25.7 mmol/L      Calcium 8.7 mg/dL      BUN/Creatinine Ratio 26.6     Anion Gap 10.3 mmol/L      eGFR 98.8 mL/min/1.73      Comment: National Kidney Foundation and American Society of Nephrology (ASN) Task Force recommended calculation based on the Chronic Kidney Disease Epidemiology Collaboration (CKD-EPI) equation refit without adjustment for race.       Narrative:      GFR Normal >60  Chronic Kidney Disease <60  Kidney Failure <15      CBC (No Diff) [[id number]]  (Abnormal) Collected: 07/01/22 0419    Specimen: Blood Updated: 07/01/22 0501     WBC 9.53 10*3/mm3      RBC 3.78 10*6/mm3      Hemoglobin 11.5 g/dL      Hematocrit 34.8 %      MCV 92.1 fL      MCH 30.4 pg      MCHC 33.0 g/dL      RDW 13.7 %      RDW-SD 45.5 fl      MPV 9.3 fL      Platelets 299 10*3/mm3           Imaging: Lumbar MRI  Indication: Back pain  Findings: Discitis suspected L1-2 without obvious epidural abscess appreciated.  Left psoas infectious myositis.  Otherwise multilevel degenerative change with disc osteophyte complexes and facet hypertrophy resulting in mild foraminal narrowing  at multiple levels.  Comparison views: No prior lumbar MRI for comparison, however radiologist makes note that cystic adnexal lesions are similar in appearance to CT of the abdomen and pelvis on 5/10/2021.      Assessment: Discitis    Plan: Lumbar MRI was reviewed with Dr. Meredith.  Does appear she has discitis at L1-L2, no epidural abscess appreciated.  If needed for treatment purposes she could have CT-guided aspiration or even aspiration of the psoas, but if this would not change treatment, would continue with current course of 6 weeks of IV antibiotics as directed by ID.  Pain is being treated appropriately.  Without obvious neurologic dysfunction currently, would not recommend surgical intervention at this point.  We reviewed red flags to include but not limited to loss of bowel or bladder control, saddle anesthesia, lower extremity numbness or weakness.  She can follow-up in the office in 4 weeks, we will obtain x-rays at that time. If patient is still here through the weekend, we will check back in Tuesday, 7/5/2022 but available for questions.    Date: 7/1/2022    ISAIAS Crane    CC: Santhosh Miller MD

## 2022-07-01 NOTE — CASE MANAGEMENT/SOCIAL WORK
Continued Stay Note  [facility]     Patient Name: Marylu Georges  MRN: [id number]  Today's Date: 7/1/2022    Admit Date: 6/27/2022     Discharge Plan     Row Name 07/01/22 1301       Plan    Plan Home with spouse. [facility] will see at D/C. Per ID, rocephin 2 g IV every 24 hours,through 8/10 Pt has a one time $50 copay for antibiotics. Plan to D/C on anticoags for Afib. Pt given $10/month coupon card for Eliquis and Xarelto. Enrolled in meds to bed.    Patient/Family in Agreement with Plan yes    Plan Comments Metw ith pt at bedside to discuss D/C plan. Per ID, rocephin 2 g IV every 24 hours, through 8/10.. [facility] will see at D/C. Pt has a one time $50 copay for antibiotics and is agreeable to pay. Plan to D/C on anticoags for Afib. Pt given $10/month coupon card for Eliquis and Xarelto. Enrolled in meds to bed. ID ordered MRI of left hand and back. results pending. Koko Muir RN-BC               Discharge Codes    No documentation.               Expected Discharge Date and Time     Expected Discharge Date Expected Discharge Time    Jul 2, 2022             Koko Muir RN

## 2022-07-01 NOTE — PROGRESS NOTES
LOS: 2 days   Patient Care Team:  Elie Dixon MD as PCP - General (Hematology and Oncology)  Joby Barron Jr., MD as Referring Physician (General Surgery)  Elie Dixon MD as Consulting Physician (Hematology and Oncology)  Pedro Marmolejo MD as Consulting Physician (Radiation Oncology)    Chief Complaint: Bilateral arm edema and tenderness with both hands and also ankles..    Interval History:  Patient was seen by dermatologist Dr. Parks on 6/27/2022.  He thinks patient has gouty arthritis versus rheumatoid arthritis versus infection.    Patient was seen by ID service Dr Grossman 6/28/2022, no evidence for bacterial infection.     6/29   Patient remains afebrile and vitally stable.  However unfortunately the left ankle aspiration grew out positive for streptococcal beta-hemolytic group C.  Prednisone was discontinued and restarted back on IV antibiotics today.  Patient also complains of pain in the left hip area.    6/30/22   Patient remains afebrile.  Patient had surgery in the morning for irrigation and debridement of the left ankle joint.     7/1/2022   T-max 99.9 in the past 24 hours.  Stable BP.  MRI today reported signs of paraspinal infection however no abscess formation.    A comprehensive 14 point review of systems was performed and was negative except as mentioned.    Vital Signs:  Temp:  [98.5 °F (36.9 °C)-99.9 °F (37.7 °C)] 98.6 °F (37 °C)  Heart Rate:  [] 92  Resp:  [17-18] 17  BP: (115-136)/(76-92) 117/87    Intake/Output Summary (Last 24 hours) at 7/1/2022 1552  Last data filed at 7/1/2022 0735  Gross per 24 hour   Intake 0 ml   Output 700 ml   Net -700 ml       Physical Exam:  GENERAL:  Well-developed, well-nourished in no acute distress.  She looks more energetic today.  SKIN:  Warm.  Skin rashes in neck and upper chest seems much improved.  Also bilateral arm skin erythematous changes also much improved.  Left ankle is wrapped in dressing.  HEENT:  Normocephalic.  LYMPHATICS:   No cervical adenopathy.  CHEST: Normal respiratory effort.  Lungs clear to auscultation. .  CARDIAC: Irregular rhythm and rate controlled.  Normal S1,S2.  ABDOMEN:  Soft, nontender.  Bowel sounds normal.  EXTREMITIES: Bilateral arm edema involving also both hands,  Her right hand MCP joints swelling also better however left hand MCP still having significant edema.  Left ankle postop.  NEUROLOGICAL:  Grossly intact.    PSYCHIATRIC:  Normal affect and mood.        Results Review:   Results from last 7 days   Lab Units 07/01/22 0419 06/30/22 0414 06/28/22  0503 06/27/22  0733   WBC 10*3/mm3 9.53 10.90* 7.75 8.06   HEMOGLOBIN g/dL 11.5* 12.8 12.0 12.7   HEMATOCRIT % 34.8 36.1 35.1 37.7   PLATELETS 10*3/mm3 299 266 166 144   MONOCYTES % %  --   --   --  3.6*       Lab Results   Component Value Date    NEUTROABS 7.48 (H) 06/27/2022     Results from last 7 days   Lab Units 07/01/22 0419 06/30/22 0414 06/29/22  0530 06/28/22  0502   SODIUM mmol/L 134* 137 135* 132*   POTASSIUM mmol/L 4.3 4.1 4.3 3.4*   CHLORIDE mmol/L 98 101 101 96*   CO2 mmol/L 25.7 25.0 23.6 20.0*   BUN mg/dL 17 20 17 18   CREATININE mg/dL 0.64 0.58 0.68 0.76   CALCIUM mg/dL 8.7 8.9 9.2 9.0   BILIRUBIN mg/dL  --  0.3 0.4 0.5   ALK PHOS U/L  --  139* 151* 142*   ALT (SGPT) U/L  --  102* 51* 46*   AST (SGOT) U/L  --  105* 49* 29   GLUCOSE mg/dL 98 121* 149* 106*     Lab Results   Component Value Date    URICACID 5.4 06/28/2022     Lab Results   Component Value Date    CRP 40.24 (H) 06/27/2022       Results from last 7 days   Lab Units 06/27/22  0733   TROPONIN T ng/mL <0.010                       I reviewed the patient's new clinical results.        IMAGING:  MRI Lumbar Spine With & Without Contrast  Narrative: MRI OF THE LUMBAR SPINE WITH AND WITHOUT CONTRAST     CLINICAL HISTORY: Low back pain. Infection suspected.     TECHNIQUE: MRI of the lumbar spine was obtained with sagittal pre and  postgadolinium T1, proton-density, and T2-weighted images.  Additionally,  there are axial pre and postgadolinium T1 and T2-weighted images through  the lumbar spine.     COMPARISON: There are no previous similar studies available for  comparison.     FINDINGS:     At L1-2, there is abnormal T2 hyperintensity within the disc space.  Additionally, there is some contrast enhancement noted along the  posterior and lateral aspects of the L1-2 disc space. Additionally,  there is abnormal edema and contrast enhancement appreciated within the  adjacent left psoas muscle. Additionally, there is abnormal contrast  enhancement within the anterior paraspinal soft tissues along the  anterior aspects of the L1 and L2 vertebral bodies. These findings are  felt to most likely be representative of changes of discitis with  adjacent paraspinal soft tissue infectious changes. There is prominent  dural thickening noted along the posterior bodies of L1, L2, and L3. The  dural thickening is compatible with epidural phlegmon. Additionally,  there are thin slivers of fluid signal intensity posterior to the L2  vertebral body that are suspicious for tiny areas of developing epidural  abscesses although to be clear, there is no evidence for a well-formed  epidural abscess at this time.     There is a mild degree of dextroconvex scoliotic curvature of the lumbar  spine with its apex centered at L1-2.     Within the visualized lower thoracic spine, there is a mild disc bulge  identified at T10-11 mildly indenting the ventral subarachnoid space and  mildly narrowing the right neural foramen. At T11-12, there is no  significant degree of canal or foraminal stenosis.     At T12-L1, there are prominent degenerative disc changes. A disc  osteophyte complex is identified which mildly indents the ventral  subarachnoid space and mildly narrows the neural foramina. Degenerative  disc changes are seen at T12-L1 with loss of disc space height and  degenerative endplate irregularity.     At L1-2, there is  degenerative retrolisthesis of L1 on L2 by  approximately 3-4 mm. A disc osteophyte complex results in a mild degree  of canal narrowing and a mild-to-moderate degree of right foraminal  narrowing in conjunction with facet hypertrophic change.     At L2-3, there is a disc osteophyte complex that mildly indents the  ventral subarachnoid space. There is a mild degree of left foraminal  narrowing secondary to a disc osteophyte complex and facet hypertrophic  change. Prominent degenerative disc changes are seen at L2-3.     At L3-4, there are prominent degenerative disc changes. A disc  osteophyte complex and facet arthropathy results in a mild degree of  bilateral foraminal narrowing.     At L4-5, degenerative disc changes are noted. There is a mild degree of  bilateral foraminal narrowing secondary to a disc osteophyte complex and  facet hypertrophic change. A mild degree of canal narrowing is seen  secondary to a disc osteophyte complex.     At L5-S1, there is no significant degree of canal or foraminal stenosis.     The contents of the pelvis are only partially visualized. However, there  is a prominently distended urinary bladder. Additionally, there are  bilateral adnexal cystic lesions that are partially seen. The lesion on  the left measures up to 5.9 x 4.6 cm in greatest axial dimensions  whereas the lesion on the right measures up to 2.0 x 1.1 cm in greatest  axial dimensions. These abnormalities were also evident on the prior CT  scan of the abdomen and pelvis dated 05/10/2021 and were of a similar  size.     Impression:    There are findings most compatible with discitis within the L1-2 disc  space in addition to anterior paraspinal soft tissue phlegmonous changes  and a left psoas muscular infectious myositis. There are findings  consistent with epidural phlegmonous changes along the posterior aspects  of the L1, L2, and L3 vertebral bodies. Furthermore, there are only thin  slivers of fluid signal  intensity within these epidural phlegmonous  changes suggesting tiny developing abscesses. However, to be clear,  there is no evidence for a well-formed abscess at this time.     There is mild dextroconvex scoliotic curvature of the lumbar spine with  its apex centered at L1-2. Multilevel degenerative phenomena resulting  in mild to mild-to-moderate degrees of canal and foraminal narrowing are  as discussed in detail above.     The contents of the pelvis are only partially visualized but there is a  prominently distended urinary bladder. Additionally, there are bilateral  nonspecific cystic adnexal lesions which were also appreciated on the  prior CT scan of the abdomen and pelvis dated 05/10/2021 and are of a  very similar size.     These findings were directly discussed with Dr. Contreras on the morning  of 07/01/2022 at approximately 11:55 AM.     This report was finalized on 7/1/2022 2:42 PM by Dr. Néstor Eaton M.D.     US Liver  Narrative: LIVER ULTRASOUND     HISTORY: Abnormal elevated liver function tests     COMPARISON: None available.     TECHNIQUE: Grayscale and color Doppler sonographic images were obtained  through the right upper quadrant.     FINDINGS:  Pancreatic duct is prominent, measuring up to 2.4 mm. No obvious  obstructing lesion is seen, and there is no dilatation of the intra or  extrahepatic biliary tree. Common bile duct measures 6 mm. Liver is  homogeneous in echotexture. Main portal vein is patent with hepatopedal  flow. Gallbladder is distended, but no stones are seen within it, and  there is no gallbladder wall thickening or pericholecystic fluid.  Gallbladder wall measures 2 mm in thickness. Right kidney measures 11.8  x 5.9 x 6.1 cm. No hydronephrosis is seen. Renal echotexture is normal.     Impression:    1. The patient's pancreatic duct is mildly dilated, of uncertain  clinical significance. There is no intra or extrahepatic biliary  dilatation. MRCP could be considered for further  assessment.  2. Gallbladder distention, without evidence of cholelithiasis or acute  cholecystitis.     This report was finalized on 7/1/2022 5:47 AM by Dr. Candice Bonner M.D.             Assessment:    1.  Left breast cancer, locally advanced, status post neoadjuvant chemotherapy with AC plus T finished in July 2019, followed by bilateral mastectomy and lymph node dissection in September 2019.  Pathology evaluation reported no residual disease.  She subsequently received a adjuvant reading therapy.  She also was started on adjuvant endocrine therapy with Femara in August 2019.  Patient later developed localized metastatic disease, and was started on Kisqali in conjunction with the letrozole/Femara.  Because of elevated liver function panel, Kisqali was discontinued after 1 month.  Eventually  letrozole/Femara.was also discontinued in December 2021.  She was started on Aromasin in late January 2022.  Patient was recently seen by Dr. Dixon on 5/21/2022, with no evidence for metastatic disease.  · On 6/28/2022, her skin rashes and arthritis has nothing to do with endocrine therapy.  Will resume exemestane 25 mg daily.     2.  Bilateral arm edema, arthralgia involving bilateral MCP joints and skin rashes.  · Venous Doppler study earlier today was negative for any venous thrombosis.   · I think this patient is to have dermatology evaluation.  Patient denies any new medication, no exposure to direct sunshine for extended time.  · Patient was seen by dermatologist  6/27/2022 for recommended blood culture uric acid in the collagen vascular labs as well as consultation for orthopedic surgery to drain the left ankle for C&S.  · 6/28/2022, patient had a normal uric acid 5.4.  So unlikely gout/gouty arthritis.  Patient was seen by ID service Dr. Grossman who does not think patient has infection and stopped IV antibiotics.   · I discussed with patient today on 6/28/2022, recommended to start prednisone 30 mg  daily for 3 days, and then can be tapered gradually and then she needs to see rheumatologist as outpatient.  I explained to patient the side effects with insomnia, agitation, increased appetite.  · Prednisone was discontinued on 6/29/2022 because of positive bacterial growth from the left ankle aspiration.   · Skin rashes seems slightly better 6/30/2022, further improved on 7/1/2022.     3.    Septic ankle.    · Left ankle edema and tender.     · Venous Doppler study on 6/27/2022 was negative for DVT in the lower extremities.    · However left ankle aspiration growth positive streptococcal beta hemolytic group C, reported on 6/29/2022.  Patient was started back on IV Rocephin per ID service.  · In the morning on 6/30/2022, patient had surgery for irrigation and debridement of the left septic ankle.      4.  Back pain.   · MRI examination of the spine 7/1/2022 reported evidence of paraspinal infection, however no abscess formation.  This is likely part of the whole picture with septic joints with systematic infection.  Patient will go home with extended IV antibiotic treatment.      5.  New onset atrial fibrillation.  Patient is on beta-blocker.  She is on Lovenox anticoagulation.  Patient will follow by cardiology service.        PLAN:  1. Continue IV Rocephin, patient was also started on cefazolin every 8 hours on 6/30/2022 per ID service.  Followed by ID service.  Patient will be on extended IV antibiotics as outpatient.  2. Status post irrigation and debridement of the left septic ankle 6/30/2022.    3. Continue exemestane 25 mg daily.  4. Continue Pepcid 20 mg twice a day for GI prophylaxis.   5. Continue Lovenox anticoagulation for now.  Outpatient anticoagulation management per cardiology.   6. Management of atrial fibrillation per cardiology service and primary team.   7. Patient has follow-up appointment with Dr. Dixon on 8/16/2022.  We will keep that appointment.       Discussed with the patient at  bedside.     I discussed with ID Dr Grossman today.     We will sign off.  Please call if any questions.     Danelle Cespedes MD PhD  07/01/22  15:52 EDT

## 2022-07-01 NOTE — PROGRESS NOTES
Name: Marylu Goerges ADMIT: 2022   : 1960  PCP: Elie Dixon MD    MRN: [id number] LOS: 2 days   AGE/SEX: 62 y.o. female  ROOM: [facility]     Subjective   Subjective     She's reporting a lot of back spasms/back discomfort. She says that the pain medication is helping some and the tizanidine is helping a lot, but she's not getting it frequently enough. She underwent lumbar MRI today which shows discitis, left psoas myositis, and possible tiny developing abscesses. Spine surgery has been consulted. An MRI of her left hand is pending.       Objective   Objective   Vital Signs  Temp:  [98.5 °F (36.9 °C)-99.9 °F (37.7 °C)] 98.6 °F (37 °C)  Heart Rate:  [] 92  Resp:  [17-18] 17  BP: (115-136)/(76-92) 117/87  SpO2:  [95 %-97 %] 95 %  on   ;   Device (Oxygen Therapy): room air  Body mass index is 25.02 kg/m².  Physical Exam  Constitutional:       General: She is not in acute distress.     Appearance: She is not ill-appearing.   Cardiovascular:      Rate and Rhythm: Normal rate. Rhythm irregular.      Heart sounds: Normal heart sounds.   Pulmonary:      Effort: Pulmonary effort is normal.      Breath sounds: Normal breath sounds.   Abdominal:      General: Bowel sounds are normal.      Palpations: Abdomen is soft.   Musculoskeletal:         General: Swelling and tenderness present.      Right lower leg: No edema.      Left lower leg: Edema present.   Skin:     Findings: Erythema and rash present.      Comments: Bilateral upper extremity swelling on both arms to her hands bilaterally, worse on the left, with blotchy erythema and warmth and tenderness, both improving dramatically. Swelling is still rather prominant, but the erythema/rash, warmth, tenderness are all greatly reduced  Left ankle dressed   Neurological:      Mental Status: She is alert.   Psychiatric:         Mood and Affect: Mood normal.         Behavior: Behavior normal.         Results Review     I reviewed the patient's new clinical  results.  Results from last 7 days   Lab Units 07/01/22 0419 06/30/22 0414 06/28/22  0503 06/27/22  0733   WBC 10*3/mm3 9.53 10.90* 7.75 8.06   HEMOGLOBIN g/dL 11.5* 12.8 12.0 12.7   PLATELETS 10*3/mm3 299 266 166 144     Results from last 7 days   Lab Units 07/01/22 0419 06/30/22 0414 06/29/22  0530 06/28/22  0502   SODIUM mmol/L 134* 137 135* 132*   POTASSIUM mmol/L 4.3 4.1 4.3 3.4*   CHLORIDE mmol/L 98 101 101 96*   CO2 mmol/L 25.7 25.0 23.6 20.0*   BUN mg/dL 17 20 17 18   CREATININE mg/dL 0.64 0.58 0.68 0.76   GLUCOSE mg/dL 98 121* 149* 106*   Estimated Creatinine Clearance: 98.6 mL/min (by C-G formula based on SCr of 0.64 mg/dL).  Results from last 7 days   Lab Units 06/30/22 0414 06/29/22  0530 06/28/22  0502 06/27/22  0733   ALBUMIN g/dL 2.90* 2.90* 3.00* 3.40*   BILIRUBIN mg/dL 0.3 0.4 0.5 0.7   ALK PHOS U/L 139* 151* 142* 154*   AST (SGOT) U/L 105* 49* 29 42*   ALT (SGPT) U/L 102* 51* 46* 71*     Results from last 7 days   Lab Units 07/01/22 0419 06/30/22 0414 06/29/22  0530 06/28/22  0502 06/27/22  0733   CALCIUM mg/dL 8.7 8.9 9.2 9.0 9.1   ALBUMIN g/dL  --  2.90* 2.90* 3.00* 3.40*     Results from last 7 days   Lab Units 06/28/22  0502   PROCALCITONIN ng/mL 0.64*     COVID19   Date Value Ref Range Status   06/30/2022 Not Detected Not Detected - Ref. Range Final   06/27/2022 Not Detected Not Detected - Ref. Range Final   07/17/2021 Not Detected Not Detected - Ref. Range Final     No results found for: HGBA1C, POCGLU    MRI Lumbar Spine With & Without Contrast  Narrative: MRI OF THE LUMBAR SPINE WITH AND WITHOUT CONTRAST     CLINICAL HISTORY: Low back pain. Infection suspected.     TECHNIQUE: MRI of the lumbar spine was obtained with sagittal pre and  postgadolinium T1, proton-density, and T2-weighted images. Additionally,  there are axial pre and postgadolinium T1 and T2-weighted images through  the lumbar spine.     COMPARISON: There are no previous similar studies available for  comparison.      FINDINGS:  At L1-2, there is abnormal T2 hyperintensity within the disc space.  Additionally, there is some contrast enhancement noted along the  posterior and lateral aspects of the L1-2 disc space. Additionally,  there is abnormal edema and contrast enhancement appreciated within the  adjacent left psoas muscle. Additionally, there is abnormal contrast  enhancement within the anterior paraspinal soft tissues along the  anterior aspects of the L1 and L2 vertebral bodies. These findings are  felt to most likely be representative of changes of discitis with  adjacent paraspinal soft tissue infectious changes. There is prominent  dural thickening noted along the posterior bodies of L1, L2, and L3. The  dural thickening is compatible with epidural phlegmon. Additionally,  there are thin slivers of fluid signal intensity posterior to the L2  vertebral body that are suspicious for tiny areas of developing epidural  abscesses although to be clear, there is no evidence for a well-formed  epidural abscess at this time.     There is a mild degree of dextroconvex scoliotic curvature of the lumbar  spine with its apex centered at L1-2.     Within the visualized lower thoracic spine, there is a mild disc bulge  identified at T10-11 mildly indenting the ventral subarachnoid space and  mildly narrowing the right neural foramen. At T11-12, there is no  significant degree of canal or foraminal stenosis.     At T12-L1, there are prominent degenerative disc changes. A disc  osteophyte complex is identified which mildly indents the ventral  subarachnoid space and mildly narrows the neural foramina. Degenerative  disc changes are seen at T12-L1 with loss of disc space height and  degenerative endplate irregularity.     At L1-2, there is degenerative retrolisthesis of L1 on L2 by  approximately 3-4 mm. A disc osteophyte complex results in a mild degree  of canal narrowing and a mild-to-moderate degree of right foraminal  narrowing in  conjunction with facet hypertrophic change.     At L2-3, there is a disc osteophyte complex that mildly indents the  ventral subarachnoid space. There is a mild degree of left foraminal  narrowing secondary to a disc osteophyte complex and facet hypertrophic  change. Prominent degenerative disc changes are seen at L2-3.     At L3-4, there are prominent degenerative disc changes. A disc  osteophyte complex and facet arthropathy results in a mild degree of  bilateral foraminal narrowing.     At L4-5, degenerative disc changes are noted. There is a mild degree of  bilateral foraminal narrowing secondary to a disc osteophyte complex and  facet hypertrophic change. A mild degree of canal narrowing is seen  secondary to a disc osteophyte complex.     At L5-S1, there is no significant degree of canal or foraminal stenosis.     The contents of the pelvis are only partially visualized. However, there  is a prominently distended urinary bladder. Additionally, there are  bilateral adnexal cystic lesions that are partially seen. The lesion on  the left measures up to 5.9 x 4.6 cm in greatest axial dimensions  whereas the lesion on the right measures up to 2.0 x 1.1 cm in greatest  axial dimensions. These abnormalities were also evident on the prior CT  scan of the abdomen and pelvis dated 05/10/2021 and were of a similar  size.     Impression: There are findings most compatible with discitis within the L1-2 disc  space in addition to anterior paraspinal soft tissue phlegmonous changes  and a left psoas muscular infectious myositis. There are findings  consistent with epidural phlegmonous changes along the posterior aspects  of the L1, L2, and L3 vertebral bodies. Furthermore, there are only thin  slivers of fluid signal intensity within these epidural phlegmonous  changes suggesting tiny developing abscesses. However, to be clear,  there is no evidence for a well-formed abscess at this time.     There is mild dextroconvex  scoliotic curvature of the lumbar spine with  its apex centered at L1-2. Multilevel degenerative phenomena resulting  in mild to mild-to-moderate degrees of canal and foraminal narrowing are  as discussed in detail above.     The contents of the pelvis are only partially visualized but there is a  prominently distended urinary bladder. Additionally, there are bilateral  nonspecific cystic adnexal lesions which were also appreciated on the  prior CT scan of the abdomen and pelvis dated 05/10/2021 and are of a  very similar size.           US Liver  Narrative: LIVER ULTRASOUND     HISTORY: Abnormal elevated liver function tests     COMPARISON: None available.     TECHNIQUE: Grayscale and color Doppler sonographic images were obtained  through the right upper quadrant.     FINDINGS:  Pancreatic duct is prominent, measuring up to 2.4 mm. No obvious  obstructing lesion is seen, and there is no dilatation of the intra or  extrahepatic biliary tree. Common bile duct measures 6 mm. Liver is  homogeneous in echotexture. Main portal vein is patent with hepatopedal  flow. Gallbladder is distended, but no stones are seen within it, and  there is no gallbladder wall thickening or pericholecystic fluid.  Gallbladder wall measures 2 mm in thickness. Right kidney measures 11.8  x 5.9 x 6.1 cm. No hydronephrosis is seen. Renal echotexture is normal.     Impression:    1. The patient's pancreatic duct is mildly dilated, of uncertain  clinical significance. There is no intra or extrahepatic biliary  dilatation. MRCP could be considered for further assessment.  2. Gallbladder distention, without evidence of cholelithiasis or acute  cholecystitis.     This report was finalized on 7/1/2022 5:47 AM by Dr. Candice Bonner M.D.       Scheduled Medications  cefTRIAXone, 2 g, Intravenous, Q24H  clobetasol, 1 application, Topical, Q12H  digoxin, 125 mcg, Oral, Daily  enoxaparin, 1 mg/kg, Subcutaneous, Q12H  exemestane, 25 mg, Oral,  Daily  famotidine, 20 mg, Oral, BID AC  hydrOXYzine pamoate, 25 mg, Oral, TID  lidocaine, 1 patch, Transdermal, Q24H  metoprolol tartrate, 50 mg, Oral, Q12H  sodium chloride, 10 mL, Intravenous, Q12H    Infusions  lactated ringers, 9 mL/hr, Last Rate: 9 mL/hr (06/30/22 0805)  lactated ringers, 100 mL/hr, Last Rate: 100 mL/hr (06/30/22 1142)    Diet  Diet Regular       Assessment/Plan     Active Hospital Problems    Diagnosis  POA   • **Atrial fibrillation (HCC) [I48.91]  Yes   • New onset atrial fibrillation (HCC) [I48.91]  Yes   • Inflammatory arthritis [M19.90]  Yes   • Transaminitis [R74.01]  Yes   • Lymphedema of upper extremity, bilateral [I89.0]  Yes   • Rash [R21]  Yes   • Streptococcal arthritis of left ankle (HCC) [M00.272]  Unknown   • Malignant neoplasm of overlapping sites of left breast in female, estrogen receptor positive (HCC) [C50.812, Z17.0]  Not Applicable      Resolved Hospital Problems   No resolved problems to display.       62 y.o. female admitted with Atrial fibrillation (HCC).    New onset afib with rvr-tsh was normal. Echocardiogram with patent foramen ovale and severely dilated left atrial cavity. Continue digoxin, and metoprolol. Therapeutic lovenox reinitiated today.  Left ankle septic arthritis with group c strep-s/p I&D today with orthopedic surgery. On ceftriaxone.  Left shoulder synovitis-went for joint aspiration on 6/30/22, but only scant material was able to be obtained. Culture is negative so far.  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 cellulitis of the bilateral upper extremities and chest-greatly improved with topical steroids, antihistamines, and abx.  Discitis, myositis, and tiny developing abscesses of the lumbar spine-spine surgery is consulted. On ceftriaxone as above. Given this finding, currently the plan is for 6  weeks of antibiotics.  Pain related to the above-increase muscle relaxant frequency and increase norco dose. Continue lidocaine patches. Continue bowel regimen.  Elevated liver enzymes-acute hepatitis panel was negative. Liver u/s showed some mild dilation of the pancreatic duct. She's not having any abdominal pain/nausea/vomiting. Will check a lipase in the morning.   History of left sided breast cancer s/p bilateral mastectomy in 2019, chemo/xrt-aromasin  History of pericardial mass which resolved with chemotherapy-not seen on TTE this admission  History of CVA  Essential hypertension-adequate control acutely  therapeutic lovenox for DVT prophylaxis.  pepcid for gi ppx   Full code.  Discussed with patient, family and nursing staff. I called the patient's brother today at her and his request with an update  Anticipate discharge home timing yet to be determined.      Santhosh Miller MD  [facility]  07/01/22  14:42 EDT    I wore protective equipment throughout this patient encounter including a face mask, gloves and protective eyewear.  Hand hygiene was performed before donning protective equipment and after removal when leaving the room.

## 2022-07-01 NOTE — PLAN OF CARE
Papi completed this AM as the pt went to the OR yesterday for a I&D of the L ankle. She c/o back spasms this AM limiting her activity tolerance. Pt's L forearm, wrist and hand very reddened, hard and edema present. Min A for bed mobility. Total A LBD. Fair sitting balance. Min A x1-2 to stand, pt placing most of her weight through the RUE/RLE. Total A provided for a brief change. She declined any side steps to a BS to complete her toileting and opted to complete her toileting in the brief from supine. Pt reported leaving her LUE locked up close to her L side and not moving it - OT attempted to assist pt through AAROM with pt impulsively moving it in all directions - she reported her LUE is not her concern. Education provided on edema control and AROM as tolerated to decrease further complications. Increased time provided for positioning the pt in comfortable positions. She may benefit from continued skilled inpatient OT services with a d/c to SNF pending progress.     Patient was not wearing a face mask during this therapy encounter. Therapist used appropriate personal protective equipment including mask and gloves. Mask used was standard procedure mask. Appropriate PPE was worn during the entire therapy session. Hand hygiene was completed before and after therapy session. Patient is not in enhanced droplet precautions.

## 2022-07-02 LAB
ALBUMIN SERPL-MCNC: 3 G/DL (ref 3.5–5.2)
ALBUMIN/GLOB SERPL: 1.1 G/DL
ALP SERPL-CCNC: 124 U/L (ref 39–117)
ALT SERPL W P-5'-P-CCNC: 61 U/L (ref 1–33)
ANION GAP SERPL CALCULATED.3IONS-SCNC: 11 MMOL/L (ref 5–15)
AST SERPL-CCNC: 61 U/L (ref 1–32)
BASOPHILS # BLD AUTO: 0.02 10*3/MM3 (ref 0–0.2)
BASOPHILS NFR BLD AUTO: 0.2 % (ref 0–1.5)
BILIRUB SERPL-MCNC: 0.6 MG/DL (ref 0–1.2)
BUN SERPL-MCNC: 16 MG/DL (ref 8–23)
BUN/CREAT SERPL: 22.2 (ref 7–25)
CALCIUM SPEC-SCNC: 8.9 MG/DL (ref 8.6–10.5)
CHLORIDE SERPL-SCNC: 97 MMOL/L (ref 98–107)
CO2 SERPL-SCNC: 28 MMOL/L (ref 22–29)
CREAT SERPL-MCNC: 0.72 MG/DL (ref 0.57–1)
DEPRECATED RDW RBC AUTO: 44.2 FL (ref 37–54)
EGFRCR SERPLBLD CKD-EPI 2021: 93.5 ML/MIN/1.73
EOSINOPHIL # BLD AUTO: 0.03 10*3/MM3 (ref 0–0.4)
EOSINOPHIL NFR BLD AUTO: 0.3 % (ref 0.3–6.2)
ERYTHROCYTE [DISTWIDTH] IN BLOOD BY AUTOMATED COUNT: 13.5 % (ref 12.3–15.4)
GLOBULIN UR ELPH-MCNC: 2.8 GM/DL
GLUCOSE SERPL-MCNC: 111 MG/DL (ref 65–99)
HCT VFR BLD AUTO: 35.6 % (ref 34–46.6)
HGB BLD-MCNC: 11.9 G/DL (ref 12–15.9)
HIV1+2 AB SER QL: NORMAL
IMM GRANULOCYTES # BLD AUTO: 0.13 10*3/MM3 (ref 0–0.05)
IMM GRANULOCYTES NFR BLD AUTO: 1.4 % (ref 0–0.5)
LIPASE SERPL-CCNC: 130 U/L (ref 13–60)
LYMPHOCYTES # BLD AUTO: 1.34 10*3/MM3 (ref 0.7–3.1)
LYMPHOCYTES NFR BLD AUTO: 14.6 % (ref 19.6–45.3)
MCH RBC QN AUTO: 30.4 PG (ref 26.6–33)
MCHC RBC AUTO-ENTMCNC: 33.4 G/DL (ref 31.5–35.7)
MCV RBC AUTO: 90.8 FL (ref 79–97)
MONOCYTES # BLD AUTO: 0.63 10*3/MM3 (ref 0.1–0.9)
MONOCYTES NFR BLD AUTO: 6.9 % (ref 5–12)
NEUTROPHILS NFR BLD AUTO: 7 10*3/MM3 (ref 1.7–7)
NEUTROPHILS NFR BLD AUTO: 76.6 % (ref 42.7–76)
NRBC BLD AUTO-RTO: 0 /100 WBC (ref 0–0.2)
PLATELET # BLD AUTO: 357 10*3/MM3 (ref 140–450)
PMV BLD AUTO: 9.2 FL (ref 6–12)
POTASSIUM SERPL-SCNC: 4.1 MMOL/L (ref 3.5–5.2)
PROT SERPL-MCNC: 5.8 G/DL (ref 6–8.5)
RBC # BLD AUTO: 3.92 10*6/MM3 (ref 3.77–5.28)
SODIUM SERPL-SCNC: 136 MMOL/L (ref 136–145)
WBC NRBC COR # BLD: 9.15 10*3/MM3 (ref 3.4–10.8)

## 2022-07-02 PROCEDURE — 80053 COMPREHEN METABOLIC PANEL: CPT | Performed by: STUDENT IN AN ORGANIZED HEALTH CARE EDUCATION/TRAINING PROGRAM

## 2022-07-02 PROCEDURE — G0432 EIA HIV-1/HIV-2 SCREEN: HCPCS | Performed by: INTERNAL MEDICINE

## 2022-07-02 PROCEDURE — 99232 SBSQ HOSP IP/OBS MODERATE 35: CPT | Performed by: NURSE PRACTITIONER

## 2022-07-02 PROCEDURE — 83690 ASSAY OF LIPASE: CPT | Performed by: STUDENT IN AN ORGANIZED HEALTH CARE EDUCATION/TRAINING PROGRAM

## 2022-07-02 PROCEDURE — 25010000002 ENOXAPARIN PER 10 MG: Performed by: NURSE PRACTITIONER

## 2022-07-02 PROCEDURE — 25010000002 CEFTRIAXONE PER 250 MG: Performed by: ORTHOPAEDIC SURGERY

## 2022-07-02 PROCEDURE — 85025 COMPLETE CBC W/AUTO DIFF WBC: CPT | Performed by: STUDENT IN AN ORGANIZED HEALTH CARE EDUCATION/TRAINING PROGRAM

## 2022-07-02 PROCEDURE — 99232 SBSQ HOSP IP/OBS MODERATE 35: CPT | Performed by: INTERNAL MEDICINE

## 2022-07-02 PROCEDURE — 99233 SBSQ HOSP IP/OBS HIGH 50: CPT | Performed by: ORTHOPAEDIC SURGERY

## 2022-07-02 RX ORDER — DIGOXIN 125 MCG
125 TABLET ORAL ONCE
Status: COMPLETED | OUTPATIENT
Start: 2022-07-02 | End: 2022-07-02

## 2022-07-02 RX ORDER — HYDROCODONE BITARTRATE AND ACETAMINOPHEN 7.5; 325 MG/1; MG/1
1 TABLET ORAL EVERY 4 HOURS PRN
Status: COMPLETED | OUTPATIENT
Start: 2022-07-02 | End: 2022-07-02

## 2022-07-02 RX ORDER — HYDROCODONE BITARTRATE AND ACETAMINOPHEN 7.5; 325 MG/1; MG/1
1 TABLET ORAL EVERY 4 HOURS PRN
Status: DISPENSED | OUTPATIENT
Start: 2022-07-02 | End: 2022-07-09

## 2022-07-02 RX ORDER — DIGOXIN 250 MCG
250 TABLET ORAL
Status: DISCONTINUED | OUTPATIENT
Start: 2022-07-03 | End: 2022-07-05

## 2022-07-02 RX ADMIN — CEFTRIAXONE SODIUM 2 G: 2 INJECTION, POWDER, FOR SOLUTION INTRAMUSCULAR; INTRAVENOUS at 12:15

## 2022-07-02 RX ADMIN — CLOBETASOL PROPIONATE 1 APPLICATION: 0.5 CREAM TOPICAL at 20:11

## 2022-07-02 RX ADMIN — HYDROXYZINE PAMOATE 25 MG: 25 CAPSULE ORAL at 20:09

## 2022-07-02 RX ADMIN — DIGOXIN 125 MCG: 125 TABLET ORAL at 12:15

## 2022-07-02 RX ADMIN — ENOXAPARIN SODIUM 70 MG: 100 INJECTION SUBCUTANEOUS at 10:16

## 2022-07-02 RX ADMIN — FAMOTIDINE 20 MG: 20 TABLET ORAL at 10:15

## 2022-07-02 RX ADMIN — HYDROCODONE BITARTRATE AND ACETAMINOPHEN 1 TABLET: 7.5; 325 TABLET ORAL at 06:59

## 2022-07-02 RX ADMIN — HYDROCODONE BITARTRATE AND ACETAMINOPHEN 1 TABLET: 7.5; 325 TABLET ORAL at 18:12

## 2022-07-02 RX ADMIN — HYDROCODONE BITARTRATE AND ACETAMINOPHEN 1 TABLET: 7.5; 325 TABLET ORAL at 22:03

## 2022-07-02 RX ADMIN — HYDROCODONE BITARTRATE AND ACETAMINOPHEN 1 TABLET: 7.5; 325 TABLET ORAL at 12:16

## 2022-07-02 RX ADMIN — TIZANIDINE 4 MG: 4 TABLET ORAL at 13:21

## 2022-07-02 RX ADMIN — ENOXAPARIN SODIUM 70 MG: 100 INJECTION SUBCUTANEOUS at 22:03

## 2022-07-02 RX ADMIN — HYDROXYZINE PAMOATE 25 MG: 25 CAPSULE ORAL at 09:53

## 2022-07-02 RX ADMIN — METOPROLOL TARTRATE 50 MG: 25 TABLET, FILM COATED ORAL at 09:56

## 2022-07-02 RX ADMIN — EXEMESTANE 25 MG: 25 TABLET, FILM COATED ORAL at 09:56

## 2022-07-02 RX ADMIN — TIZANIDINE 4 MG: 4 TABLET ORAL at 00:02

## 2022-07-02 RX ADMIN — HYDROCODONE BITARTRATE AND ACETAMINOPHEN 1 TABLET: 7.5; 325 TABLET ORAL at 03:03

## 2022-07-02 RX ADMIN — Medication 10 ML: at 20:10

## 2022-07-02 RX ADMIN — CLOBETASOL PROPIONATE 1 APPLICATION: 0.5 CREAM TOPICAL at 09:59

## 2022-07-02 RX ADMIN — METOPROLOL TARTRATE 50 MG: 25 TABLET, FILM COATED ORAL at 20:09

## 2022-07-02 RX ADMIN — TIZANIDINE 4 MG: 4 TABLET ORAL at 20:09

## 2022-07-02 RX ADMIN — HYDROXYZINE PAMOATE 25 MG: 25 CAPSULE ORAL at 17:06

## 2022-07-02 RX ADMIN — FAMOTIDINE 20 MG: 20 TABLET ORAL at 17:06

## 2022-07-02 RX ADMIN — Medication 10 ML: at 09:57

## 2022-07-02 RX ADMIN — LIDOCAINE 1 PATCH: 50 PATCH TOPICAL at 20:09

## 2022-07-02 RX ADMIN — DIGOXIN 125 MCG: 125 TABLET ORAL at 17:06

## 2022-07-02 RX ADMIN — TIZANIDINE 4 MG: 4 TABLET ORAL at 06:14

## 2022-07-02 NOTE — PROGRESS NOTES
HOSPITAL PROGRESS NOTE    Date of Service: 22  LOS:  LOS: 3 days   Patient Name: Marylu Georges  Age/Sex: 62 y.o. female  : 1960  MRN: [id number]  Primary Cardiologist: Dr. Danni Odell     Subjective:     Chief Complaint/Follow up:   PAF, Diastolic HF    Interval History:   Resting in bed.  Left arm still quite edematous.  Reports dyspnea when she first gets up because of severe leg pain.  Denies chest pain, palpitations, or dizziness.    Objective:     Objective:  Temp:  [98.8 °F (37.1 °C)-99.5 °F (37.5 °C)] 99.5 °F (37.5 °C)  Heart Rate:  [] 104  Resp:  [18] 18  BP: (106-134)/(70-94) 106/74  Body mass index is 25.02 kg/m².    Intake/Output Summary (Last 24 hours) at 2022 1432  Last data filed at 2022 0950  Gross per 24 hour   Intake 120 ml   Output 1050 ml   Net -930 ml         22  0734 22  1343   Weight: 70.5 kg (155 lb 5 oz) 70.3 kg (155 lb)     Weight change:     Physical Exam:   General Appearance: Alert, cooperative, in no acute distress. AAOx4.   HEENT: Normocephalic.  Neck: Supple. No JVD.   Lungs: Clear. Normal respiratory effort and rate.  Heart: Irregularly, irregular.  Normal S1 and S2, no murmurs, gallops or rubs.  Abdomen: Soft, nontender, nondistended.   Extremities: Warm, no cyanosis, or clubbing.  Left arm edematous.    Lab Review:   Results from last 7 days   Lab Units 22  0527 22  0419 22  0414   SODIUM mmol/L 136 134* 137   POTASSIUM mmol/L 4.1 4.3 4.1   CHLORIDE mmol/L 97* 98 101   CO2 mmol/L 28.0 25.7 25.0   BUN mg/dL 16 17 20   CREATININE mg/dL 0.72 0.64 0.58   GLUCOSE mg/dL 111* 98 121*   CALCIUM mg/dL 8.9 8.7 8.9   AST (SGOT) U/L 61*  --  105*   ALT (SGPT) U/L 61*  --  102*     Results from last 7 days   Lab Units 22  0733   TROPONIN T ng/mL <0.010     Results from last 7 days   Lab Units 22  0527 22  0419   WBC 10*3/mm3 9.15 9.53   HEMOGLOBIN g/dL 11.9* 11.5*   HEMATOCRIT % 35.6 34.8   PLATELETS  10*3/mm3 357 299                 Results from last 7 days   Lab Units 06/27/22  0733   PROBNP pg/mL 2,048.0*     Results from last 7 days   Lab Units 06/27/22  0733   TSH uIU/mL 1.820       Results for orders placed during the hospital encounter of 06/27/22    Adult Transthoracic Echo Complete W/ Cont if Necessary Per Protocol    Interpretation Summary  · Estimated left ventricular EF = 55% Left ventricular systolic function is normal.  · Left ventricular diastolic function was indeterminate.  · Saline test results are positive with valsalva manuever. Small patent foramen ovale present.  · There is calcification of the aortic valve mainly affecting the non-coronary, left coronary and right coronary cusp(s).  · Estimated right ventricular systolic pressure from tricuspid regurgitation is mildly elevated (35-45 mmHg). Calculated right ventricular systolic pressure from tricuspid regurgitation is 41.1 mmHg.  · The left atrial cavity is severely dilated.    I reviewed the patient's new clinical results.  I personally viewed and interpreted the patient's EKG/Telemetry data/Labs/Test Results.     Current Medications:   Scheduled Meds:cefTRIAXone, 2 g, Intravenous, Q24H  clobetasol, 1 application, Topical, Q12H  digoxin, 125 mcg, Oral, Daily  enoxaparin, 1 mg/kg, Subcutaneous, Q12H  exemestane, 25 mg, Oral, Daily  famotidine, 20 mg, Oral, BID AC  hydrOXYzine pamoate, 25 mg, Oral, TID  lidocaine, 1 patch, Transdermal, Q24H  metoprolol tartrate, 50 mg, Oral, Q12H  sodium chloride, 10 mL, Intravenous, Q12H      Continuous Infusions:lactated ringers, 9 mL/hr, Last Rate: 9 mL/hr (06/30/22 0805)  lactated ringers, 100 mL/hr, Last Rate: 100 mL/hr (06/30/22 1142)        Allergies:  Allergies   Allergen Reactions   • Benadryl [Diphenhydramine] Itching     INCREASES BLOOD PRESSURE   • Erythromycin GI Intolerance   • Levaquin [Levofloxacin] GI Intolerance   • Penicillins GI Intolerance       Assessment:       Atrial fibrillation  (HCC)    Malignant neoplasm of overlapping sites of left breast in female, estrogen receptor positive (HCC)    Transaminitis    Lymphedema of upper extremity, bilateral    Rash    Inflammatory arthritis    Streptococcal arthritis of left ankle (HCC)    New onset atrial fibrillation (HCC)        Plan:   Assessment & Plan       1.  New onset Atrial Fibrillation: RVR intermittently.  Increase dig to 250 MCG daily.  Continue beta-blocker.  Start DOAC when okay with team.    2.  Diastolic heart failure-preserved LVEF on echo.  Stable.  3.  Small PFO noted on echocardiogram  4.  Low-grade temperature-WBC normal today.  No obvious vegetation noted on TTE.  ID following.  5.  Anemia-stable  6.  Elevated liver enzymes  7.  Normal bilateral lower extremity venous duplex and upper extremity venous duplex.  8.  Hypoalbuminemia  9.  Reports generalized pain.  10.  Cardiology will follow along.    ISAIAS Feldman  [facility]   07/02/22  14:32 EDT

## 2022-07-02 NOTE — PLAN OF CARE
Goal Outcome Evaluation:  Plan of Care Reviewed With: patient        Progress: no change  Outcome Evaluation: VSS;PRN pain med given every four hours; Left ankle dressing c/d/i; Left hand MRI to be done today; Purewick in place; IV rocephin increased to 2g Q24; will continue to monitor

## 2022-07-02 NOTE — PROGRESS NOTES
Name: Marylu Georges ADMIT: 2022   : 1960  PCP: Elie Dixon MD    MRN: [id number] LOS: 3 days   AGE/SEX: 62 y.o. female  ROOM: [facility]     Subjective   Subjective     No new events overnight. Working on pain control.        Objective   Objective   Vital Signs  Temp:  [98.6 °F (37 °C)-99.5 °F (37.5 °C)] 99.5 °F (37.5 °C)  Heart Rate:  [] 93  Resp:  [17-18] 18  BP: (106-134)/(70-94) 134/90  SpO2:  [93 %-98 %] 98 %  on   ;   Device (Oxygen Therapy): room air  Body mass index is 25.02 kg/m².  Physical Exam  Constitutional:       General: She is not in acute distress.     Appearance: She is not ill-appearing.   Cardiovascular:      Rate and Rhythm: Normal rate. Rhythm irregular.      Heart sounds: Normal heart sounds.   Pulmonary:      Effort: Pulmonary effort is normal.      Breath sounds: Normal breath sounds.   Abdominal:      General: Bowel sounds are normal.      Palpations: Abdomen is soft.   Musculoskeletal:         General: Swelling and tenderness present.      Right lower leg: No edema.      Left lower leg: Edema present.   Skin:     Findings: Erythema and rash present.      Comments: Bilateral upper extremity swelling on both arms to her hands bilaterally, worse on the left, with blotchy erythema and warmth and tenderness, both improving dramatically. Swelling is still rather prominant, but the erythema/rash, warmth, tenderness are all greatly reduced  Left ankle dressed   Neurological:      Mental Status: She is alert.   Psychiatric:         Mood and Affect: Mood normal.         Behavior: Behavior normal.         Results Review     I reviewed the patient's new clinical results.  Results from last 7 days   Lab Units 22  04122  0503   WBC 10*3/mm3 9.15 9.53 10.90* 7.75   HEMOGLOBIN g/dL 11.9* 11.5* 12.8 12.0   PLATELETS 10*3/mm3 357 299 266 166     Results from last 7 days   Lab Units 22  0522  041  06/29/22  0530   SODIUM mmol/L 136 134* 137 135*   POTASSIUM mmol/L 4.1 4.3 4.1 4.3   CHLORIDE mmol/L 97* 98 101 101   CO2 mmol/L 28.0 25.7 25.0 23.6   BUN mg/dL 16 17 20 17   CREATININE mg/dL 0.72 0.64 0.58 0.68   GLUCOSE mg/dL 111* 98 121* 149*   Estimated Creatinine Clearance: 87.6 mL/min (by C-G formula based on SCr of 0.72 mg/dL).  Results from last 7 days   Lab Units 07/02/22  0527 06/30/22  0414 06/29/22  0530 06/28/22  0502   ALBUMIN g/dL 3.00* 2.90* 2.90* 3.00*   BILIRUBIN mg/dL 0.6 0.3 0.4 0.5   ALK PHOS U/L 124* 139* 151* 142*   AST (SGOT) U/L 61* 105* 49* 29   ALT (SGPT) U/L 61* 102* 51* 46*     Results from last 7 days   Lab Units 07/02/22  0527 07/01/22  0419 06/30/22  0414 06/29/22  0530 06/28/22  0502   CALCIUM mg/dL 8.9 8.7 8.9 9.2 9.0   ALBUMIN g/dL 3.00*  --  2.90* 2.90* 3.00*     Results from last 7 days   Lab Units 06/28/22  0502   PROCALCITONIN ng/mL 0.64*     COVID19   Date Value Ref Range Status   06/30/2022 Not Detected Not Detected - Ref. Range Final   06/27/2022 Not Detected Not Detected - Ref. Range Final   07/17/2021 Not Detected Not Detected - Ref. Range Final     No results found for: HGBA1C, POCGLU    MRI Lumbar Spine With & Without Contrast  Narrative: MRI OF THE LUMBAR SPINE WITH AND WITHOUT CONTRAST     CLINICAL HISTORY: Low back pain. Infection suspected.     TECHNIQUE: MRI of the lumbar spine was obtained with sagittal pre and  postgadolinium T1, proton-density, and T2-weighted images. Additionally,  there are axial pre and postgadolinium T1 and T2-weighted images through  the lumbar spine.     COMPARISON: There are no previous similar studies available for  comparison.     FINDINGS:     At L1-2, there is abnormal T2 hyperintensity within the disc space.  Additionally, there is some contrast enhancement noted along the  posterior and lateral aspects of the L1-2 disc space. Additionally,  there is abnormal edema and contrast enhancement appreciated within the  adjacent left psoas  muscle. Additionally, there is abnormal contrast  enhancement within the anterior paraspinal soft tissues along the  anterior aspects of the L1 and L2 vertebral bodies. These findings are  felt to most likely be representative of changes of discitis with  adjacent paraspinal soft tissue infectious changes. There is prominent  dural thickening noted along the posterior bodies of L1, L2, and L3. The  dural thickening is compatible with epidural phlegmon. Additionally,  there are thin slivers of fluid signal intensity posterior to the L2  vertebral body that are suspicious for tiny areas of developing epidural  abscesses although to be clear, there is no evidence for a well-formed  epidural abscess at this time.     There is a mild degree of dextroconvex scoliotic curvature of the lumbar  spine with its apex centered at L1-2.     Within the visualized lower thoracic spine, there is a mild disc bulge  identified at T10-11 mildly indenting the ventral subarachnoid space and  mildly narrowing the right neural foramen. At T11-12, there is no  significant degree of canal or foraminal stenosis.     At T12-L1, there are prominent degenerative disc changes. A disc  osteophyte complex is identified which mildly indents the ventral  subarachnoid space and mildly narrows the neural foramina. Degenerative  disc changes are seen at T12-L1 with loss of disc space height and  degenerative endplate irregularity.     At L1-2, there is degenerative retrolisthesis of L1 on L2 by  approximately 3-4 mm. A disc osteophyte complex results in a mild degree  of canal narrowing and a mild-to-moderate degree of right foraminal  narrowing in conjunction with facet hypertrophic change.     At L2-3, there is a disc osteophyte complex that mildly indents the  ventral subarachnoid space. There is a mild degree of left foraminal  narrowing secondary to a disc osteophyte complex and facet hypertrophic  change. Prominent degenerative disc changes are  seen at L2-3.     At L3-4, there are prominent degenerative disc changes. A disc  osteophyte complex and facet arthropathy results in a mild degree of  bilateral foraminal narrowing.     At L4-5, degenerative disc changes are noted. There is a mild degree of  bilateral foraminal narrowing secondary to a disc osteophyte complex and  facet hypertrophic change. A mild degree of canal narrowing is seen  secondary to a disc osteophyte complex.     At L5-S1, there is no significant degree of canal or foraminal stenosis.     The contents of the pelvis are only partially visualized. However, there  is a prominently distended urinary bladder. Additionally, there are  bilateral adnexal cystic lesions that are partially seen. The lesion on  the left measures up to 5.9 x 4.6 cm in greatest axial dimensions  whereas the lesion on the right measures up to 2.0 x 1.1 cm in greatest  axial dimensions. These abnormalities were also evident on the prior CT  scan of the abdomen and pelvis dated 05/10/2021 and were of a similar  size.     Impression:    There are findings most compatible with discitis within the L1-2 disc  space in addition to anterior paraspinal soft tissue phlegmonous changes  and a left psoas muscular infectious myositis. There are findings  consistent with epidural phlegmonous changes along the posterior aspects  of the L1, L2, and L3 vertebral bodies. Furthermore, there are only thin  slivers of fluid signal intensity within these epidural phlegmonous  changes suggesting tiny developing abscesses. However, to be clear,  there is no evidence for a well-formed abscess at this time.     There is mild dextroconvex scoliotic curvature of the lumbar spine with  its apex centered at L1-2. Multilevel degenerative phenomena resulting  in mild to mild-to-moderate degrees of canal and foraminal narrowing are  as discussed in detail above.     The contents of the pelvis are only partially visualized but there is a  prominently  distended urinary bladder. Additionally, there are bilateral  nonspecific cystic adnexal lesions which were also appreciated on the  prior CT scan of the abdomen and pelvis dated 05/10/2021 and are of a  very similar size.     These findings were directly discussed with Dr. Contreras on the morning  of 07/01/2022 at approximately 11:55 AM.     This report was finalized on 7/1/2022 2:42 PM by Dr. Néstor Eaton M.D.     US Liver  Narrative: LIVER ULTRASOUND     HISTORY: Abnormal elevated liver function tests     COMPARISON: None available.     TECHNIQUE: Grayscale and color Doppler sonographic images were obtained  through the right upper quadrant.     FINDINGS:  Pancreatic duct is prominent, measuring up to 2.4 mm. No obvious  obstructing lesion is seen, and there is no dilatation of the intra or  extrahepatic biliary tree. Common bile duct measures 6 mm. Liver is  homogeneous in echotexture. Main portal vein is patent with hepatopedal  flow. Gallbladder is distended, but no stones are seen within it, and  there is no gallbladder wall thickening or pericholecystic fluid.  Gallbladder wall measures 2 mm in thickness. Right kidney measures 11.8  x 5.9 x 6.1 cm. No hydronephrosis is seen. Renal echotexture is normal.     Impression:    1. The patient's pancreatic duct is mildly dilated, of uncertain  clinical significance. There is no intra or extrahepatic biliary  dilatation. MRCP could be considered for further assessment.  2. Gallbladder distention, without evidence of cholelithiasis or acute  cholecystitis.     This report was finalized on 7/1/2022 5:47 AM by Dr. Candice Bonner M.D.       Scheduled Medications  cefTRIAXone, 2 g, Intravenous, Q24H  clobetasol, 1 application, Topical, Q12H  digoxin, 125 mcg, Oral, Daily  enoxaparin, 1 mg/kg, Subcutaneous, Q12H  exemestane, 25 mg, Oral, Daily  famotidine, 20 mg, Oral, BID AC  hydrOXYzine pamoate, 25 mg, Oral, TID  lidocaine, 1 patch, Transdermal, Q24H  metoprolol  tartrate, 50 mg, Oral, Q12H  sodium chloride, 10 mL, Intravenous, Q12H    Infusions  lactated ringers, 9 mL/hr, Last Rate: 9 mL/hr (06/30/22 0805)  lactated ringers, 100 mL/hr, Last Rate: 100 mL/hr (06/30/22 1142)    Diet  Diet Regular       Assessment/Plan     Active Hospital Problems    Diagnosis  POA   • **Atrial fibrillation (HCC) [I48.91]  Yes   • New onset atrial fibrillation (HCC) [I48.91]  Yes   • Inflammatory arthritis [M19.90]  Yes   • Transaminitis [R74.01]  Yes   • Lymphedema of upper extremity, bilateral [I89.0]  Yes   • Rash [R21]  Yes   • Streptococcal arthritis of left ankle (HCC) [M00.272]  Unknown   • Malignant neoplasm of overlapping sites of left breast in female, estrogen receptor positive (HCC) [C50.812, Z17.0]  Not Applicable      Resolved Hospital Problems   No resolved problems to display.       62 y.o. female admitted with Atrial fibrillation (HCC).    New onset afib with rvr-tsh was normal. Echocardiogram with patent foramen ovale and severely dilated left atrial cavity. Continue digoxin, and metoprolol. On therapeutic lovenox for AC. Will need to transition to an oral AC once it's clear that no further operative interventions or procedures are necessary.   Left ankle septic arthritis with group c strep-s/p I&D on 6/30/22 by Dr. Tran. On ceftriaxone per ID  Discitis, myositis of the lumbar spine-spine surgery has evaluated. No surgical intervention is recommend at this time. is consulted. On ceftriaxone as above.   Left shoulder synovitis-went for joint aspiration on 6/30/22, but only scant material was able to be obtained. Culture is negative at 2 days.  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 cellulitis of the bilateral upper extremities and chest-greatly improved with topical steroids, antihistamines, and abx.  Pain related to the  above-continue norco, muscle relaxant, lidocaine patches, bowel regimen.   Elevated liver enzymes-acute hepatitis panel was negative. Liver u/s showed some mild dilation of the pancreatic duct. Lipase was slightly elevated. Her LFTs are trending down today and she's not having any abdominal pain/nausea/vomiting, so will hold off on MRCP for the time being unless LFTs worsen again or she develops abdominal symptoms.  History of left sided breast cancer s/p bilateral mastectomy in 2019, chemo/xrt-aromasin  History of pericardial mass which resolved with chemotherapy-not seen on TTE this admission  History of CVA  Essential hypertension-adequate control acutely  therapeutic lovenox for DVT prophylaxis.  pepcid for gi ppx   Full code.  Discussed with patient and nursing staff.  Anticipate discharge home with HH vs SNU facility timing yet to be determined. pt indicates to me that she's not confident in her ability to safely return home and she's not sure whether her  will be able to adequately take care of her and she would prefer to go to rehab. I will notify CCP.      Santhosh Miller MD  [facility]  07/02/22  11:54 EDT    I wore protective equipment throughout this patient encounter including a face mask, gloves and protective eyewear.  Hand hygiene was performed before donning protective equipment and after removal when leaving the room.

## 2022-07-02 NOTE — PROGRESS NOTES
Orthopedic Progress Note      Patient: Marylu Georges  Date of Admission: 6/27/2022  YOB: 1960  Medical Record Number: [id number]    POD # :  2 Days Post-Op Procedure(s) (LRB):  INCISION AND DRAINAGE left ankle (Left)    Patient seen and this morning.  She reports that her ankle pain does feel better she is able to mobilize some.  She reports her elbow and shoulder pain are much improved she was able to wave around her arm without any discomfort.  She states she has some swelling along the left hand specifically the first 2 digits in the dorsal aspect of her hand.  No significant pain at rest but she difficulty with movement.  No other complaints.      Physical Exam:  62 y.o.  female  Vitals:  Temp:  [98.6 °F (37 °C)-99.5 °F (37.5 °C)] 99.5 °F (37.5 °C)  Heart Rate:  [] 97  Resp:  [17-18] 18  BP: (106-127)/(70-94) 127/94  alert and oriented    Ext: Left upper extremity no overt tenderness to palpation of the shoulder or elbow swelling of the distal aspect of the arm seems to be improved.  Elbow and shoulder range of motion full and painless.  No overt warmth about the shoulder or redness.  There is some swelling at the middle and ring finger MCP joints and a purplish type discoloration with little bit of warmth no overt redness.  Just some generalized swelling of the hand she is able to wiggle her fingers difficulty making a fist due to some of the swelling.  She is able to flex her PIP and DIP joints of the middle and pointer finger.  Some discomfort with palpation along the MCP joints.    Left lower extremity still some swelling about the calf foot and ankle no overt skin redness or overt discomfort with palpation.  Is able to wiggle her toes and move her ankle some as well.  Compartments are soft compressible.  Dressings clean dry and intact.      Skin: Incision clean dry and intact w/out signs or  symptoms of infection.    Activity: Mobilizing Per P.T.   Weight Bearing: As  Tolerated    Data Review     No results displayed because visit has over 200 results.          No results found.    Medications:  cefTRIAXone, 2 g, Intravenous, Q24H  clobetasol, 1 application, Topical, Q12H  digoxin, 125 mcg, Oral, Daily  enoxaparin, 1 mg/kg, Subcutaneous, Q12H  exemestane, 25 mg, Oral, Daily  famotidine, 20 mg, Oral, BID AC  hydrOXYzine pamoate, 25 mg, Oral, TID  lidocaine, 1 patch, Transdermal, Q24H  metoprolol tartrate, 50 mg, Oral, Q12H  sodium chloride, 10 mL, Intravenous, Q12H      •  senna-docusate sodium **AND** polyethylene glycol **AND** bisacodyl **AND** bisacodyl  •  HYDROcodone-acetaminophen  •  nitroglycerin  •  ondansetron **OR** ondansetron  •  [COMPLETED] Insert peripheral IV **AND** sodium chloride  •  sodium chloride  •  tiZANidine  •  traZODone      Imaging: Hand MRI pending      Assessment:  Doing well POD  # 2 Days Post-Op Procedure(s) (LRB):  INCISION AND DRAINAGE left ankle (Left)  Problems Addressed this Visit        Cardiac and Vasculature    * (Principal) Atrial fibrillation (HCC)       Other    Streptococcal arthritis of left ankle (HCC) - Primary    Relevant Orders    Case Request (Completed)      Other Visit Diagnoses     Elevated LFTs        Elevated C-reactive protein (CRP)        Elevated sed rate        Edema of both upper extremities          Diagnoses       Codes Comments    Streptococcal arthritis of left ankle (HCC)    -  Primary ICD-10-CM: M00.272  ICD-9-CM: 711.07, 041.00     Atrial fibrillation, unspecified type (HCC)     ICD-10-CM: I48.91  ICD-9-CM: 427.31     Elevated LFTs     ICD-10-CM: R79.89  ICD-9-CM: 790.6     Elevated C-reactive protein (CRP)     ICD-10-CM: R79.82  ICD-9-CM: 790.95     Elevated sed rate     ICD-10-CM: R70.0  ICD-9-CM: 790.1     Edema of both upper extremities     ICD-10-CM: R60.0  ICD-9-CM: 782.3           Plan:  Recommend elevation of left lower extremity work on ankle pumps.  Ice as needed for swelling.  Also to work on left upper  extremity motion ice and elevate as needed.  This time I not feel that her shoulder pain was directly related to infection given the fact that she never had overt tenderness palpation directly over the shoulder joint, no redness or fullness.  Minimal fluid was aspirated and current cultures negative.  Always been able to passively range the shoulder.  Motion is much improved and she seems to have full active range of motion without any discomfort.  To me this may have been more inflammatory instead of infection.  We will continue monitoring changes.  Patient's hand swelling seems more noticeable particularly along the MCP joints of the middle and pointer finger.  She is able to flex DIP and PIP as noted so less likely involving the flexor tendon sheath.  We will continue to monitor.    MRI pending of the left hand will follow-up: If evidence of fluid collection or abscess formation may consider hand surgery consultation available.      Continue efforts to mobilize  Continue Pain Control Measures  Continue incisional Care  DVT prophylaxis per primary    Please call for questions or concerns thank you    Kenneth Tran MD    Date: 7/2/2022  Time: 09:54 EDT

## 2022-07-02 NOTE — PLAN OF CARE
Goal Outcome Evaluation:               VSS.Pt continues on IV ABT,swelling to extremities improved today,pain mgt effective with PRN.up to bedside commode with assist.MRI of L hand pending.TM

## 2022-07-02 NOTE — PROGRESS NOTES
LOS: 3 days     Chief Complaint: Septic arthritis    Interval History: Afebrile, no new complaints.  States her left hand is a little bit improved although still very tender and swollen.  Tolerating antibiotics without diarrhea vomiting or rash.  No shortness of breath or cough    Vital Signs  Temp:  [98.6 °F (37 °C)-99.5 °F (37.5 °C)] 99.5 °F (37.5 °C)  Heart Rate:  [] 97  Resp:  [17-18] 18  BP: (106-127)/(70-94) 127/94    Physical Exam:  General: No apparent distress  Cardiovascular: RRR, upper extremity edema  Respiratory: Breathing comfortably on room air  GI: Soft, NT/ND, + bowel sounds bilaterally  Skin: No rashes   Extremities: Limited range of motion of left shoulder swollen left MCP joint.    Antibiotics:  Ceftriaxone 2 g IV every 24 hours     Results Review:    Lab Results   Component Value Date    WBC 9.15 07/02/2022    HGB 11.9 (L) 07/02/2022    HCT 35.6 07/02/2022    MCV 90.8 07/02/2022     07/02/2022     Lab Results   Component Value Date    GLUCOSE 111 (H) 07/02/2022    BUN 16 07/02/2022    CREATININE 0.72 07/02/2022    EGFRIFNONA 63 01/25/2022    BCR 22.2 07/02/2022    CO2 28.0 07/02/2022    CALCIUM 8.9 07/02/2022    ALBUMIN 3.00 (L) 07/02/2022    AST 61 (H) 07/02/2022    ALT 61 (H) 07/02/2022     HIV nonreactive    Microbiology:  6/30 left shoulder cultures NGTD  6/28 left ankle cultures Streptococcus dysgalactiae   6/28 BCx NGTD x 2    Assessment & Plan   1.  Septic arthritis L ankle d/t Group C strep, s/p I and D on 6/30  2.  Left shoulder synovitis  3.  Polyarticular arthritis flare with positive RF; AARON negative, ccp p  4.  AFib, heart rate improved  5.  Lumbar spine discitis    Spine surgery note reviewed.  Appreciate their assistance.  Recommend medical management only at this time.  Cardiology note reviewed.  No indication for a KENNETH at this time.  Awaiting MRI of the left hand today.  Continue ceftriaxone 2 g IV every 24 hours.  Duration to be determined.

## 2022-07-03 LAB
ALBUMIN SERPL-MCNC: 2.7 G/DL (ref 3.5–5.2)
ALBUMIN/GLOB SERPL: 0.9 G/DL
ALP SERPL-CCNC: 109 U/L (ref 39–117)
ALT SERPL W P-5'-P-CCNC: 50 U/L (ref 1–33)
ANION GAP SERPL CALCULATED.3IONS-SCNC: 11 MMOL/L (ref 5–15)
AST SERPL-CCNC: 41 U/L (ref 1–32)
BACTERIA SPEC AEROBE CULT: NORMAL
BACTERIA SPEC AEROBE CULT: NORMAL
BILIRUB SERPL-MCNC: 0.5 MG/DL (ref 0–1.2)
BUN SERPL-MCNC: 15 MG/DL (ref 8–23)
BUN/CREAT SERPL: 24.6 (ref 7–25)
CALCIUM SPEC-SCNC: 8.6 MG/DL (ref 8.6–10.5)
CHLORIDE SERPL-SCNC: 97 MMOL/L (ref 98–107)
CO2 SERPL-SCNC: 25 MMOL/L (ref 22–29)
CREAT SERPL-MCNC: 0.61 MG/DL (ref 0.57–1)
DEPRECATED RDW RBC AUTO: 45.4 FL (ref 37–54)
EGFRCR SERPLBLD CKD-EPI 2021: 100 ML/MIN/1.73
ERYTHROCYTE [DISTWIDTH] IN BLOOD BY AUTOMATED COUNT: 13.6 % (ref 12.3–15.4)
GLOBULIN UR ELPH-MCNC: 3.1 GM/DL
GLUCOSE SERPL-MCNC: 96 MG/DL (ref 65–99)
HCT VFR BLD AUTO: 35.8 % (ref 34–46.6)
HGB BLD-MCNC: 11.9 G/DL (ref 12–15.9)
MCH RBC QN AUTO: 30.7 PG (ref 26.6–33)
MCHC RBC AUTO-ENTMCNC: 33.2 G/DL (ref 31.5–35.7)
MCV RBC AUTO: 92.5 FL (ref 79–97)
PLATELET # BLD AUTO: 359 10*3/MM3 (ref 140–450)
PMV BLD AUTO: 9.1 FL (ref 6–12)
POTASSIUM SERPL-SCNC: 4.4 MMOL/L (ref 3.5–5.2)
PROT SERPL-MCNC: 5.8 G/DL (ref 6–8.5)
RBC # BLD AUTO: 3.87 10*6/MM3 (ref 3.77–5.28)
SODIUM SERPL-SCNC: 133 MMOL/L (ref 136–145)
WBC NRBC COR # BLD: 6.38 10*3/MM3 (ref 3.4–10.8)

## 2022-07-03 PROCEDURE — 97530 THERAPEUTIC ACTIVITIES: CPT

## 2022-07-03 PROCEDURE — 80053 COMPREHEN METABOLIC PANEL: CPT | Performed by: STUDENT IN AN ORGANIZED HEALTH CARE EDUCATION/TRAINING PROGRAM

## 2022-07-03 PROCEDURE — 25010000002 ENOXAPARIN PER 10 MG: Performed by: NURSE PRACTITIONER

## 2022-07-03 PROCEDURE — 99232 SBSQ HOSP IP/OBS MODERATE 35: CPT | Performed by: NURSE PRACTITIONER

## 2022-07-03 PROCEDURE — 85027 COMPLETE CBC AUTOMATED: CPT | Performed by: STUDENT IN AN ORGANIZED HEALTH CARE EDUCATION/TRAINING PROGRAM

## 2022-07-03 PROCEDURE — 25010000002 CEFTRIAXONE PER 250 MG: Performed by: ORTHOPAEDIC SURGERY

## 2022-07-03 RX ADMIN — Medication 10 ML: at 20:38

## 2022-07-03 RX ADMIN — CLOBETASOL PROPIONATE 1 APPLICATION: 0.5 CREAM TOPICAL at 08:35

## 2022-07-03 RX ADMIN — LIDOCAINE 1 PATCH: 50 PATCH TOPICAL at 20:38

## 2022-07-03 RX ADMIN — CLOBETASOL PROPIONATE 1 APPLICATION: 0.5 CREAM TOPICAL at 21:39

## 2022-07-03 RX ADMIN — HYDROXYZINE PAMOATE 25 MG: 25 CAPSULE ORAL at 08:35

## 2022-07-03 RX ADMIN — TIZANIDINE 4 MG: 4 TABLET ORAL at 14:39

## 2022-07-03 RX ADMIN — EXEMESTANE 25 MG: 25 TABLET, FILM COATED ORAL at 08:36

## 2022-07-03 RX ADMIN — HYDROCODONE BITARTRATE AND ACETAMINOPHEN 1 TABLET: 7.5; 325 TABLET ORAL at 10:32

## 2022-07-03 RX ADMIN — TIZANIDINE 4 MG: 4 TABLET ORAL at 08:35

## 2022-07-03 RX ADMIN — Medication 10 ML: at 10:33

## 2022-07-03 RX ADMIN — CEFTRIAXONE SODIUM 2 G: 2 INJECTION, POWDER, FOR SOLUTION INTRAMUSCULAR; INTRAVENOUS at 10:33

## 2022-07-03 RX ADMIN — FAMOTIDINE 20 MG: 20 TABLET ORAL at 18:06

## 2022-07-03 RX ADMIN — HYDROCODONE BITARTRATE AND ACETAMINOPHEN 1 TABLET: 7.5; 325 TABLET ORAL at 02:09

## 2022-07-03 RX ADMIN — DOCUSATE SODIUM 50MG AND SENNOSIDES 8.6MG 2 TABLET: 8.6; 5 TABLET, FILM COATED ORAL at 08:49

## 2022-07-03 RX ADMIN — HYDROXYZINE PAMOATE 25 MG: 25 CAPSULE ORAL at 20:37

## 2022-07-03 RX ADMIN — HYDROCODONE BITARTRATE AND ACETAMINOPHEN 1 TABLET: 7.5; 325 TABLET ORAL at 15:48

## 2022-07-03 RX ADMIN — ENOXAPARIN SODIUM 70 MG: 100 INJECTION SUBCUTANEOUS at 10:32

## 2022-07-03 RX ADMIN — DIGOXIN 250 MCG: 250 TABLET ORAL at 13:22

## 2022-07-03 RX ADMIN — METOPROLOL TARTRATE 50 MG: 25 TABLET, FILM COATED ORAL at 20:37

## 2022-07-03 RX ADMIN — METOPROLOL TARTRATE 50 MG: 25 TABLET, FILM COATED ORAL at 08:37

## 2022-07-03 RX ADMIN — TIZANIDINE 4 MG: 4 TABLET ORAL at 02:09

## 2022-07-03 RX ADMIN — HYDROCODONE BITARTRATE AND ACETAMINOPHEN 1 TABLET: 7.5; 325 TABLET ORAL at 20:38

## 2022-07-03 RX ADMIN — FAMOTIDINE 20 MG: 20 TABLET ORAL at 06:29

## 2022-07-03 RX ADMIN — HYDROXYZINE PAMOATE 25 MG: 25 CAPSULE ORAL at 18:06

## 2022-07-03 RX ADMIN — HYDROCODONE BITARTRATE AND ACETAMINOPHEN 1 TABLET: 7.5; 325 TABLET ORAL at 06:29

## 2022-07-03 RX ADMIN — TIZANIDINE 4 MG: 4 TABLET ORAL at 20:37

## 2022-07-03 RX ADMIN — ENOXAPARIN SODIUM 70 MG: 100 INJECTION SUBCUTANEOUS at 22:19

## 2022-07-03 NOTE — THERAPY TREATMENT NOTE
Patient Name: Marylu Georges  : 1960    MRN: [id number]                              Today's Date: 7/3/2022       Admit Date: 2022    Visit Dx:     ICD-10-CM ICD-9-CM   1. Streptococcal arthritis of left ankle (HCC)  M00.272 711.07     041.00   2. Atrial fibrillation, unspecified type (HCC)  I48.91 427.31   3. Elevated LFTs  R79.89 790.6   4. Elevated C-reactive protein (CRP)  R79.82 790.95   5. Elevated sed rate  R70.0 790.1   6. Edema of both upper extremities  R60.0 782.3     Patient Active Problem List   Diagnosis   • Malignant neoplasm of overlapping sites of left breast in female, estrogen receptor positive (HCC)   • Family history of breast cancer in female   • Syncope   • Malignant neoplasm of overlapping sites of both breasts in female, estrogen receptor positive (HCC)   • Hypertension   • Encounter for long-term (current) use of other medications   • Drug-induced hepatitis   • Atrial fibrillation (HCC)   • Transaminitis   • Lymphedema of upper extremity, bilateral   • Rash   • Inflammatory arthritis   • Streptococcal arthritis of left ankle (HCC)   • New onset atrial fibrillation (HCC)     Past Medical History:   Diagnosis Date   • Anemia in neoplastic disease    • Arthritis    • Breast cancer (HCC)     Left   • CVA (cerebral vascular accident) (HCC)    • Hip pain     RIGHT HIP... CYST   • History of fracture of leg    • History of radiation therapy     LAST TREATMENT     • Hypertension    • Limited joint range of motion (ROM)     RIGHT HIP   • Skin sore     OPEN SORE LEFT BREAST   • Syncope    • Vertigo      Past Surgical History:   Procedure Laterality Date   • AXILLARY LYMPH NODE BIOPSY/EXCISION Right     LYMPH NODE UNDER RIGHT ARM-MALIGNANT (DOUBLE MASTECTOMY)   • BREAST BIOPSY Left     MALIGNANT   • FRACTURE SURGERY      Leg   • HARDWARE REMOVAL     • INCISION AND DRAINAGE LEG Left 2022    Procedure: INCISION AND DRAINAGE left ankle;  Surgeon: Kenneth Tran MD;   Location: [facility] MAIN OR;  Service: Orthopedics;  Laterality: Left;   • MASTECTOMY W/ SENTINEL NODE BIOPSY Bilateral 9/16/2019    Procedure: BILATERLA MODIFIED RADICAL MASTECTOMY WITH BILATERAL SENTINEL LYMPH NODE BIOPSY;  Surgeon: Joby Barron Jr., MD;  Location: [facility] MAIN OR;  Service: General   • TOTAL HIP ARTHROPLASTY Right 3/2/2020    Procedure: RIGHT TOTAL HIP ARTHROPLASTY NATALY NAVIGATION;  Surgeon: Luis M Leonard MD;  Location: [facility] MAIN OR;  Service: Orthopedics;  Laterality: Right;   • TOTAL HIP ARTHROPLASTY Left 7/20/2021    Procedure: Posterior LEFT TOTAL HIP ARTHROPLASTY NATALY NAVIGATION;  Surgeon: Luis M Leonard MD;  Location: [facility] MAIN OR;  Service: Orthopedics;  Laterality: Left;   • VENOUS ACCESS DEVICE (PORT) INSERTION Right 2/1/2019    Procedure: INSERTION VENOUS ACCESS DEVICE;  Surgeon: Joby Barron Jr., MD;  Location: [facility];  Service: General   • VENOUS ACCESS DEVICE (PORT) REMOVAL N/A 10/30/2019    Procedure: Mediport Removal;  Surgeon: Joby Barron Jr., MD;  Location: [facility] OR;  Service: General      General Information     Row Name 07/03/22 1331          Physical Therapy Time and Intention    Document Type therapy note (daily note)  -RS     Mode of Treatment physical therapy;individual therapy  -RS     Row Name 07/03/22 1331          General Information    Patient Profile Reviewed yes  -RS     Existing Precautions/Restrictions fall  -RS           User Key  (r) = Recorded By, (t) = Taken By, (c) = Cosigned By    Initials Name Provider Type    RS Thais Deutsch PT Physical Therapist               Mobility     Row Name 07/03/22 1331          Bed Mobility    Bed Mobility supine-sit;sit-supine  -RS     Supine-Sit Bard (Bed Mobility) minimum assist (75% patient effort)  -RS     Sit-Supine Bard (Bed Mobility) minimum assist (75% patient effort)  -RS     Assistive Device (Bed Mobility) bed rails  -RS     Comment, (Bed Mobility) cues for log  "roll, increased time for sequencing  -RS     Row Name 07/03/22 1331          Sit-Stand Transfer    Sit-Stand Weston (Transfers) contact guard  -RS     Assistive Device (Sit-Stand Transfers) walker, front-wheeled  -RS     Row Name 07/03/22 1331          Gait/Stairs (Locomotion)    Weston Level (Gait) contact guard  -RS     Assistive Device (Gait) walker, front-wheeled  -RS     Distance in Feet (Gait) pt took 3 small steps toward EOB, attempted fwd ambulationand pt reports unable to tolerate WB LLE  -RS     Deviations/Abnormal Patterns (Gait) antalgic  -RS     Bilateral Gait Deviations forward flexed posture  -RS     Row Name 07/03/22 1331          Mobility    Extremity Weight-bearing Status left lower extremity  -RS     Left Lower Extremity (Weight-bearing Status) weight-bearing as tolerated (WBAT)  -RS           User Key  (r) = Recorded By, (t) = Taken By, (c) = Cosigned By    Initials Name Provider Type    RS Thais Deutsch PT Physical Therapist               Obj/Interventions     Row Name 07/03/22 1332          Motor Skills    Therapeutic Exercise --  BLE LAQ, AP, MIP x 10  -RS           User Key  (r) = Recorded By, (t) = Taken By, (c) = Cosigned By    Initials Name Provider Type    RS Thais Deutsch PT Physical Therapist               Goals/Plan    No documentation.                Clinical Impression     Row Name 07/03/22 1332          Pain    Pre/Posttreatment Pain Comment pt repots pain is improved at atart of PT session secondary to pain medication, reports pain is typically quite high however currently \"is good\"  -RS     Row Name 07/03/22 1332          Plan of Care Review    Plan of Care Reviewed With patient  -RS     Progress improving  -RS     Outcome Evaluation Pt with improvement in tolerance for standing activities this date compared to previous, pt attributes to improved pain control. She standing for 5 min with rolling walker with increased weight bearing RLE compared to LLE. " Attempted lateral weight shift to improve upon L LE tolerance for WB however pt reproted severe pain and inability to tolerate. Also attempted short distance ambulation, pt unable to tolerate this date. She remains appropriate for skilled PT to address limitations in mobility, strength, and pain to facilitate return to PLOF.  -RS     Row Name 07/03/22 1332          Positioning and Restraints    Pre-Treatment Position in bed  -RS     Post Treatment Position bed  -RS     In Bed supine;call light within reach;encouraged to call for assist;exit alarm on  -RS           User Key  (r) = Recorded By, (t) = Taken By, (c) = Cosigned By    Initials Name Provider Type    RS Thais Deutsch PT Physical Therapist               Outcome Measures     Row Name 07/03/22 0245          How much help from another person do you currently need...    Turning from your back to your side while in flat bed without using bedrails? 3  -RS     Moving from lying on back to sitting on the side of a flat bed without bedrails? 3  -RS     Moving to and from a bed to a chair (including a wheelchair)? 3  -RS     Standing up from a chair using your arms (e.g., wheelchair, bedside chair)? 3  -RS     Climbing 3-5 steps with a railing? 2  -RS     To walk in hospital room? 3  -RS     AM-PAC 6 Clicks Score (PT) 17  -RS     Highest level of mobility 5 --> Static standing  -RS     Row Name 07/03/22 2991          Functional Assessment    Outcome Measure Options AM-PAC 6 Clicks Basic Mobility (PT)  -RS           User Key  (r) = Recorded By, (t) = Taken By, (c) = Cosigned By    Initials Name Provider Type    RS Thais Deutsch PT Physical Therapist                             Physical Therapy Education                 Title: PT OT SLP Therapies (In Progress)     Topic: Physical Therapy (In Progress)     Point: Mobility training (Done)     Learning Progress Summary           Patient Acceptance, E,D, Bed IU by  at 7/3/2022 1645    Acceptance, E,TB, VU by   at 7/3/2022 0437    Acceptance, D,E, VU,NR by MS at 7/1/2022 1119    Acceptance, E,D, VU,NR by MS at 6/28/2022 1123                   Point: Home exercise program (Not Started)     Learner Progress:  Not documented in this visit.          Point: Body mechanics (Done)     Learning Progress Summary           Patient Acceptance, E,D, Bed IU by  at 7/3/2022 1335    Acceptance, E,TB, VU by  at 7/3/2022 0437    Acceptance, D,E, VU,NR by MS at 7/1/2022 1119    Acceptance, E,D, VU,NR by MS at 6/28/2022 1123                   Point: Precautions (Done)     Learning Progress Summary           Patient Acceptance, E,TB, VU by  at 7/3/2022 0437    Acceptance, D,E, VU,NR by MS at 7/1/2022 1119    Acceptance, E,D, VU,NR by MS at 6/28/2022 1123                               User Key     Initials Effective Dates Name Provider Type Discipline    MS 06/16/21 -  Hardeep Miller, PT Physical Therapist PT     06/16/21 -  Thais Deutsch PT Physical Therapist PT     06/16/21 -  Emma Hopper RN Registered Nurse Nurse              PT Recommendation and Plan     Plan of Care Reviewed With: patient  Progress: improving  Outcome Evaluation: Pt with improvement in tolerance for standing activities this date compared to previous, pt attributes to improved pain control. She standing for 5 min with rolling walker with increased weight bearing RLE compared to LLE. Attempted lateral weight shift to improve upon L LE tolerance for WB however pt reproted severe pain and inability to tolerate. Also attempted short distance ambulation, pt unable to tolerate this date. She remains appropriate for skilled PT to address limitations in mobility, strength, and pain to facilitate return to PLOF.     Time Calculation:    PT Charges     Row Name 07/03/22 4875             Time Calculation    Start Time 0955  -RS      Stop Time 1011  -RS      Time Calculation (min) 16 min  -RS      PT Received On 07/03/22  -      PT - Next Appointment  07/05/22  -RS              Timed Charges    31211 - PT Therapeutic Exercise Minutes 5  -RS      83826 - PT Therapeutic Activity Minutes 11  -RS              Total Minutes    Timed Charges Total Minutes 16  -RS       Total Minutes 16  -RS            User Key  (r) = Recorded By, (t) = Taken By, (c) = Cosigned By    Initials Name Provider Type    RS Thais Deutsch, PT Physical Therapist              Therapy Charges for Today     Code Description Service Date Service Provider Modifiers Qty    70759713457 HC PT THERAPEUTIC ACT EA 15 MIN 7/3/2022 Thais Deutsch, PT GP 1          PT G-Codes  Outcome Measure Options: AM-PAC 6 Clicks Basic Mobility (PT)  AM-PAC 6 Clicks Score (PT): 17  AM-PAC 6 Clicks Score (OT): 10  Modified Reno Scale: 4 - Moderately severe disability.  Unable to walk without assistance, and unable to attend to own bodily needs without assistance.    Thais Deutsch PT  7/3/2022

## 2022-07-03 NOTE — PLAN OF CARE
Goal Outcome Evaluation:  Plan of Care Reviewed With: patient        Progress: no change  Outcome Evaluation: VSS. Complained of generalized pain and muscle spasms - PRN meds given as requested. L hand MRI still to be done. Dsg to L ankle = C/D/I. Purewick in place. Weight shifting assistance provided. Cream applied to skin. Lidoderm patch applied to back. No other changes this shift. WCM

## 2022-07-03 NOTE — PLAN OF CARE
Goal Outcome Evaluation:  Plan of Care Reviewed With: patient        Progress: improving  Outcome Evaluation: Pt with improvement in tolerance for standing activities this date compared to previous, pt attributes to improved pain control. She standing for 5 min with rolling walker with increased weight bearing RLE compared to LLE. Attempted lateral weight shift to improve upon L LE tolerance for WB however pt reproted severe pain and inability to tolerate. Also attempted short distance ambulation, pt unable to tolerate this date. She remains appropriate for skilled PT to address limitations in mobility, strength, and pain to facilitate return to PLOF.

## 2022-07-03 NOTE — PROGRESS NOTES
Dedicated to Hospital Care    [phone]   LOS: 4 days     Name: Marylu Georges  Age/Sex: 62 y.o. female  :  1960        PCP: Elie Dixon MD  Chief Complaint   Patient presents with   • Arm Swelling      Subjective   Her pain is better controlled today.  Overall she is feeling a lot better denies new issues or complaints this morning.  Tolerating IV antibiotics denies fevers chills nausea vomiting or diarrhea presently.  General: No Fever or Chills, Cardiac: No Chest Pain or Palpitations, Resp: No Cough or SOA, GI: No Nausea, Vomiting, or Diarrhea and Other: No bleeding    cefTRIAXone, 2 g, Intravenous, Q24H  clobetasol, 1 application, Topical, Q12H  digoxin, 250 mcg, Oral, Daily  enoxaparin, 1 mg/kg, Subcutaneous, Q12H  exemestane, 25 mg, Oral, Daily  famotidine, 20 mg, Oral, BID AC  hydrOXYzine pamoate, 25 mg, Oral, TID  lidocaine, 1 patch, Transdermal, Q24H  metoprolol tartrate, 50 mg, Oral, Q12H  sodium chloride, 10 mL, Intravenous, Q12H           Objective   Vital Signs  Temp:  [98.1 °F (36.7 °C)-98.5 °F (36.9 °C)] 98.3 °F (36.8 °C)  Heart Rate:  [85-94] 92  Resp:  [18] 18  BP: (104-115)/(70-85) 115/80  Body mass index is 25.02 kg/m².    Intake/Output Summary (Last 24 hours) at 7/3/2022 1506  Last data filed at 7/3/2022 1500  Gross per 24 hour   Intake 0 ml   Output 1400 ml   Net -1400 ml       Physical Exam  Vitals reviewed.   Constitutional:       Appearance: Normal appearance.   Cardiovascular:      Rate and Rhythm: Normal rate and regular rhythm.   Pulmonary:      Effort: No respiratory distress.      Breath sounds: Normal breath sounds.   Abdominal:      General: Bowel sounds are normal. There is no distension.      Palpations: Abdomen is soft.   Neurological:      Mental Status: She is alert.           Results Review:       I reviewed the patient's new clinical results.  Results from last 7 days   Lab Units 22  0611 22  0527 22  0419 22  0414 22  0506  06/27/22  0733   WBC 10*3/mm3 6.38 9.15 9.53 10.90* 7.75 8.06   HEMOGLOBIN g/dL 11.9* 11.9* 11.5* 12.8 12.0 12.7   PLATELETS 10*3/mm3 359 357 299 266 166 144     Results from last 7 days   Lab Units 07/03/22  0611 07/02/22  0527 07/01/22  0419 06/30/22  0414 06/29/22  0530 06/28/22  0502 06/27/22  0733   SODIUM mmol/L 133* 136 134* 137 135* 132* 133*   POTASSIUM mmol/L 4.4 4.1 4.3 4.1 4.3 3.4* 3.5   CHLORIDE mmol/L 97* 97* 98 101 101 96* 98   CO2 mmol/L 25.0 28.0 25.7 25.0 23.6 20.0* 20.4*   BUN mg/dL 15 16 17 20 17 18 23   CREATININE mg/dL 0.61 0.72 0.64 0.58 0.68 0.76 0.97   CALCIUM mg/dL 8.6 8.9 8.7 8.9 9.2 9.0 9.1   Estimated Creatinine Clearance: 103.4 mL/min (by C-G formula based on SCr of 0.61 mg/dL).      Assessment & Plan   Active Hospital Problems    Diagnosis  POA   • **Atrial fibrillation (HCC) [I48.91]  Yes   • New onset atrial fibrillation (HCC) [I48.91]  Yes   • Inflammatory arthritis [M19.90]  Yes   • Transaminitis [R74.01]  Yes   • Lymphedema of upper extremity, bilateral [I89.0]  Yes   • Rash [R21]  Yes   • Streptococcal arthritis of left ankle (HCC) [M00.272]  Unknown   • Malignant neoplasm of overlapping sites of left breast in female, estrogen receptor positive (HCC) [C50.812, Z17.0]  Not Applicable      Resolved Hospital Problems   No resolved problems to display.       PLAN  62 y.o. female admitted with Atrial fibrillation (HCC).     · New onset afib with rvr-tsh was normal. Echocardiogram with patent foramen ovale and severely dilated left atrial cavity. Continue digoxin, and metoprolol. On therapeutic lovenox for AC. Will need to transition to an oral AC once it's clear that no further operative interventions or procedures are necessary.   · Left ankle septic arthritis with group c strep-s/p I&D on 6/30/22 by Dr. Tran. On ceftriaxone per ID  · Discitis, myositis of the lumbar spine-spine surgery has evaluated. No surgical intervention is recommend at this time. is consulted. On  ceftriaxone as above.   · Left shoulder synovitis-went for joint aspiration on 6/30/22, but only scant material was able to be obtained. Culture is negative at 2 days.  · Left hand joint swelling-MRI  ordered to evaluate for septic arthritis, I did call down and discussed with MRI it is unclear whether can get to this today or not.  They have been slammed with stat studies from the observation unit preventing them from getting to routine inpatient procedures.  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 cellulitis of the bilateral upper extremities and chest-greatly improved with topical steroids, antihistamines, and abx.  · Pain related to the above-continue norco, muscle relaxant, lidocaine patches, bowel regimen.   · Elevated liver enzymes-acute hepatitis panel was negative. Liver u/s showed some mild dilation of the pancreatic duct. Lipase was slightly elevated. Her LFTs are trending down today and she's not having any abdominal pain/nausea/vomiting, so will hold off on MRCP for the time being unless LFTs worsen again or she develops abdominal symptoms.  · History of left sided breast cancer s/p bilateral mastectomy in 2019, chemo/xrt-aromasin  · History of pericardial mass which resolved with chemotherapy-not seen on TTE this admission  · History of CVA  · Essential hypertension-adequate control acutely  · therapeutic lovenox for DVT prophylaxis.  · pepcid for gi ppx   · Full code.  · Discussed with patient and nursing staff.  · Anticipate discharge home with HH vs SNU facility timing yet to be determined. pt indicates to me that she's not confident in her ability to safely return home and she's not sure whether her  will be able to adequately take care of her and she would prefer to go to rehab. I will notify CCP.        Disposition        Luis M Shaw MD  [facility]  07/03/22  15:06  EDT

## 2022-07-03 NOTE — PROGRESS NOTES
HOSPITAL PROGRESS NOTE    Date of Service: 22  LOS:  LOS: 4 days   Patient Name: Marylu Georges  Age/Sex: 62 y.o. female  : 1960  MRN: [id number]  Primary Cardiologist: Dr. Danni Odell     Subjective:     Chief Complaint/Follow up:   PAF, Diastolic HF    Interval History:   Resting in bed.  Reports chronic pain and back spasms.  Left arm less edematous.  Denies chest pain, palpitations, shortness of breath, or dizziness.    Objective:     Objective:  Temp:  [98.1 °F (36.7 °C)-98.5 °F (36.9 °C)] 98.3 °F (36.8 °C)  Heart Rate:  [85-94] 92  Resp:  [18] 18  BP: (104-115)/(70-85) 115/80  Body mass index is 25.02 kg/m².    Intake/Output Summary (Last 24 hours) at 7/3/2022 1454  Last data filed at 7/3/2022 1100  Gross per 24 hour   Intake 0 ml   Output 1150 ml   Net -1150 ml         22  0734 22  1343   Weight: 70.5 kg (155 lb 5 oz) 70.3 kg (155 lb)     Weight change:     Physical Exam:   General Appearance: Alert, cooperative, in no acute distress. AAOx4.   HEENT: Normocephalic.  Neck: Supple. No JVD.   Lungs: Clear. Normal respiratory effort and rate.  Heart: Irregularly, irregular.  Normal S1 and S2, no murmurs, gallops or rubs.  Abdomen: Soft, nontender, nondistended.   Extremities: Warm, no cyanosis, or clubbing.  Left arm edematous.    Lab Review:   Results from last 7 days   Lab Units 22  0611 22  0527   SODIUM mmol/L 133* 136   POTASSIUM mmol/L 4.4 4.1   CHLORIDE mmol/L 97* 97*   CO2 mmol/L 25.0 28.0   BUN mg/dL 15 16   CREATININE mg/dL 0.61 0.72   GLUCOSE mg/dL 96 111*   CALCIUM mg/dL 8.6 8.9   AST (SGOT) U/L 41* 61*   ALT (SGPT) U/L 50* 61*     Results from last 7 days   Lab Units 22  0733   TROPONIN T ng/mL <0.010     Results from last 7 days   Lab Units 22  0611 22  0527   WBC 10*3/mm3 6.38 9.15   HEMOGLOBIN g/dL 11.9* 11.9*   HEMATOCRIT % 35.8 35.6   PLATELETS 10*3/mm3 359 357                 Results from last 7 days   Lab Units 22  0745    PROBNP pg/mL 2,048.0*     Results from last 7 days   Lab Units 06/27/22  0733   TSH uIU/mL 1.820       Results for orders placed during the hospital encounter of 06/27/22    Adult Transthoracic Echo Complete W/ Cont if Necessary Per Protocol    Interpretation Summary  · Estimated left ventricular EF = 55% Left ventricular systolic function is normal.  · Left ventricular diastolic function was indeterminate.  · Saline test results are positive with valsalva manuever. Small patent foramen ovale present.  · There is calcification of the aortic valve mainly affecting the non-coronary, left coronary and right coronary cusp(s).  · Estimated right ventricular systolic pressure from tricuspid regurgitation is mildly elevated (35-45 mmHg). Calculated right ventricular systolic pressure from tricuspid regurgitation is 41.1 mmHg.  · The left atrial cavity is severely dilated.    I reviewed the patient's new clinical results.  I personally viewed and interpreted the patient's EKG/Telemetry data/Labs/Test Results.     Current Medications:   Scheduled Meds:cefTRIAXone, 2 g, Intravenous, Q24H  clobetasol, 1 application, Topical, Q12H  digoxin, 250 mcg, Oral, Daily  enoxaparin, 1 mg/kg, Subcutaneous, Q12H  exemestane, 25 mg, Oral, Daily  famotidine, 20 mg, Oral, BID AC  hydrOXYzine pamoate, 25 mg, Oral, TID  lidocaine, 1 patch, Transdermal, Q24H  metoprolol tartrate, 50 mg, Oral, Q12H  sodium chloride, 10 mL, Intravenous, Q12H      Continuous Infusions:     Allergies:  Allergies   Allergen Reactions   • Benadryl [Diphenhydramine] Itching     INCREASES BLOOD PRESSURE   • Erythromycin GI Intolerance   • Levaquin [Levofloxacin] GI Intolerance   • Penicillins GI Intolerance       Assessment:       Atrial fibrillation (HCC)    Malignant neoplasm of overlapping sites of left breast in female, estrogen receptor positive (HCC)    Transaminitis    Lymphedema of upper extremity, bilateral    Rash    Inflammatory arthritis    Streptococcal  arthritis of left ankle (HCC)    New onset atrial fibrillation (HCC)        Plan:   Assessment & Plan       1.  New onset Atrial Fibrillation: Continue BB.  Dig increased yesterday.  Dig level tomorrow.  Better HR control.  Start DOAC when okay with team.  Continue enoxaparin for now.  2.  Diastolic heart failure-preserved LVEF on echo.  Stable.  3.  Small PFO noted on echocardiogram  4.  Low-grade temperature-WBC normal today.  No obvious vegetation noted on TTE.  ID following.  5.  Anemia-stable  6.  Elevated liver enzymes-improved.  7.  Left arm swelling.  Normal bilateral lower extremity venous duplex and upper extremity venous duplex.  8.  Hypoalbuminemia  9.  Reports generalized pain and back spasms.  10.  Cardiology will follow along.    ISAIAS Feldman  [facility]   07/03/22  14:54 EDT

## 2022-07-03 NOTE — PLAN OF CARE
Goal Outcome Evaluation:            VSS.Pt continues on IV ABT,swelling to extremities improved today,pain mgt effective with PRN.MRI of L hand  still pending .Stool softener administered in AM,yet to have a BM ,WCTM.

## 2022-07-04 ENCOUNTER — APPOINTMENT (OUTPATIENT)
Dept: MRI IMAGING | Facility: HOSPITAL | Age: 62
End: 2022-07-04

## 2022-07-04 LAB
DIGOXIN SERPL-MCNC: 0.8 NG/ML (ref 0.6–1.2)
MAGNESIUM SERPL-MCNC: 2.1 MG/DL (ref 1.6–2.4)
POTASSIUM SERPL-SCNC: 5 MMOL/L (ref 3.5–5.2)

## 2022-07-04 PROCEDURE — A9577 INJ MULTIHANCE: HCPCS | Performed by: HOSPITALIST

## 2022-07-04 PROCEDURE — 73220 MRI UPPR EXTREMITY W/O&W/DYE: CPT

## 2022-07-04 PROCEDURE — 84132 ASSAY OF SERUM POTASSIUM: CPT | Performed by: HOSPITALIST

## 2022-07-04 PROCEDURE — 25010000002 ENOXAPARIN PER 10 MG: Performed by: NURSE PRACTITIONER

## 2022-07-04 PROCEDURE — 25010000002 CEFTRIAXONE PER 250 MG: Performed by: ORTHOPAEDIC SURGERY

## 2022-07-04 PROCEDURE — 99233 SBSQ HOSP IP/OBS HIGH 50: CPT | Performed by: INTERNAL MEDICINE

## 2022-07-04 PROCEDURE — 0 GADOBENATE DIMEGLUMINE 529 MG/ML SOLUTION: Performed by: HOSPITALIST

## 2022-07-04 PROCEDURE — 83735 ASSAY OF MAGNESIUM: CPT | Performed by: HOSPITALIST

## 2022-07-04 PROCEDURE — 99232 SBSQ HOSP IP/OBS MODERATE 35: CPT | Performed by: NURSE PRACTITIONER

## 2022-07-04 PROCEDURE — 80162 ASSAY OF DIGOXIN TOTAL: CPT | Performed by: NURSE PRACTITIONER

## 2022-07-04 RX ORDER — SIMETHICONE 80 MG
80 TABLET,CHEWABLE ORAL 4 TIMES DAILY PRN
Status: DISCONTINUED | OUTPATIENT
Start: 2022-07-04 | End: 2022-07-13 | Stop reason: HOSPADM

## 2022-07-04 RX ORDER — IBUPROFEN 600 MG/1
600 TABLET ORAL DAILY
Status: DISCONTINUED | OUTPATIENT
Start: 2022-07-04 | End: 2022-07-08

## 2022-07-04 RX ADMIN — Medication 10 ML: at 21:11

## 2022-07-04 RX ADMIN — EXEMESTANE 25 MG: 25 TABLET, FILM COATED ORAL at 08:18

## 2022-07-04 RX ADMIN — CLOBETASOL PROPIONATE 1 APPLICATION: 0.5 CREAM TOPICAL at 21:10

## 2022-07-04 RX ADMIN — CEFTRIAXONE SODIUM 2 G: 2 INJECTION, POWDER, FOR SOLUTION INTRAMUSCULAR; INTRAVENOUS at 10:12

## 2022-07-04 RX ADMIN — HYDROCODONE BITARTRATE AND ACETAMINOPHEN 1 TABLET: 7.5; 325 TABLET ORAL at 12:28

## 2022-07-04 RX ADMIN — FAMOTIDINE 20 MG: 20 TABLET ORAL at 16:31

## 2022-07-04 RX ADMIN — LIDOCAINE 1 PATCH: 50 PATCH TOPICAL at 21:10

## 2022-07-04 RX ADMIN — CLOBETASOL PROPIONATE 1 APPLICATION: 0.5 CREAM TOPICAL at 08:19

## 2022-07-04 RX ADMIN — HYDROCODONE BITARTRATE AND ACETAMINOPHEN 1 TABLET: 7.5; 325 TABLET ORAL at 21:10

## 2022-07-04 RX ADMIN — HYDROXYZINE PAMOATE 25 MG: 25 CAPSULE ORAL at 21:09

## 2022-07-04 RX ADMIN — GADOBENATE DIMEGLUMINE 14 ML: 529 INJECTION, SOLUTION INTRAVENOUS at 15:06

## 2022-07-04 RX ADMIN — Medication 10 ML: at 08:19

## 2022-07-04 RX ADMIN — METOPROLOL TARTRATE 50 MG: 25 TABLET, FILM COATED ORAL at 21:09

## 2022-07-04 RX ADMIN — ENOXAPARIN SODIUM 70 MG: 100 INJECTION SUBCUTANEOUS at 10:11

## 2022-07-04 RX ADMIN — HYDROXYZINE PAMOATE 25 MG: 25 CAPSULE ORAL at 16:31

## 2022-07-04 RX ADMIN — HYDROCODONE BITARTRATE AND ACETAMINOPHEN 1 TABLET: 7.5; 325 TABLET ORAL at 16:31

## 2022-07-04 RX ADMIN — ENOXAPARIN SODIUM 70 MG: 100 INJECTION SUBCUTANEOUS at 22:40

## 2022-07-04 RX ADMIN — METOPROLOL TARTRATE 50 MG: 25 TABLET, FILM COATED ORAL at 08:18

## 2022-07-04 RX ADMIN — TIZANIDINE 4 MG: 4 TABLET ORAL at 02:21

## 2022-07-04 RX ADMIN — IBUPROFEN 600 MG: 600 TABLET, FILM COATED ORAL at 19:19

## 2022-07-04 RX ADMIN — SIMETHICONE CHEW TAB 80 MG 80 MG: 80 TABLET ORAL at 13:32

## 2022-07-04 RX ADMIN — TIZANIDINE 4 MG: 4 TABLET ORAL at 08:18

## 2022-07-04 RX ADMIN — HYDROCODONE BITARTRATE AND ACETAMINOPHEN 1 TABLET: 7.5; 325 TABLET ORAL at 00:32

## 2022-07-04 RX ADMIN — TIZANIDINE 4 MG: 4 TABLET ORAL at 13:31

## 2022-07-04 RX ADMIN — HYDROCODONE BITARTRATE AND ACETAMINOPHEN 1 TABLET: 7.5; 325 TABLET ORAL at 04:25

## 2022-07-04 RX ADMIN — DIGOXIN 250 MCG: 250 TABLET ORAL at 12:28

## 2022-07-04 RX ADMIN — TIZANIDINE 4 MG: 4 TABLET ORAL at 22:40

## 2022-07-04 RX ADMIN — HYDROXYZINE PAMOATE 25 MG: 25 CAPSULE ORAL at 08:18

## 2022-07-04 RX ADMIN — HYDROCODONE BITARTRATE AND ACETAMINOPHEN 1 TABLET: 7.5; 325 TABLET ORAL at 08:18

## 2022-07-04 RX ADMIN — FAMOTIDINE 20 MG: 20 TABLET ORAL at 08:18

## 2022-07-04 NOTE — NURSING NOTE
Spoke with on-call for Ortho Surg., MRI resulted given, confirmed on IV abx. Ortho Surg will forward to Dr. Tran for tomorrow.

## 2022-07-04 NOTE — PROGRESS NOTES
ID note for cellulitis?  S: Complaining of lumbar back pain which is constant, worse with movement better with analgesia and radiating down the legs.  Complains of left hand pain which is persistent.  In regards to her left ankle septic arthritis, she had some pain which she thought might have been due to the dressings and some swelling  In regards to her left shoulder pain, she thinks this is improved    ROS:  No fevers or chills or night sweats  Tolerating antibiotics    O: Temp:  [98.3 °F (36.8 °C)-99.2 °F (37.3 °C)] 98.8 °F (37.1 °C)  Heart Rate:  [83-95] 90  Resp:  [16-18] 16  BP: (112-123)/(73-80) 113/73  GENERAL: Sickly, nontoxic  HEENT: Oropharynx is clear. Hearing is grossly normal.   GI: Soft, nontender, nondistended. No appreciable organomegaly.   SKIN: Warm and dry without cutaneous eruptions  ; left shoulder improved ROM, l second mcp joint swollen  PSYCHIATRIC: Anxious mood, nl affect Fair insight, and judgment    White count 6.38, hemoglobin 11.9, platelets 359  Creatinine 0.61  6/28 Blood cultures no growth to date  6/28 left ankle arthrocentesis culture Group C strep (red blood cells 200,000, white blood cells 57,400, negative crystals)  6/30 left shoulder aspiration cultures no growth    A/p  1.  Septic arthritis L ankle d/t Group C strep, s/p I and D on 6/30  2.  Left shoulder synovitis  3.  Polyarticular arthritis flare with positive RF; AARON, ccp negative,   4.  AFib, heart rate improved  5.  Lumbar discitis  6.  Aortic valve calcification, no clear vegetation and in discussions with cardiology holding on KENNETH as will receive medical therapy for endocarditis by treatment of the discitis    Orthopedic and spine surgery notes reviewed.  No acute interventions planned.  Cultures from left ankle arthrocentesis grew group C strep and on Rocephin 2 g IV every 24 hours with 6-week course to cover for septic arthritis and discitis.  Antibiotic stop date August 10.  MRI left hand has been ordered since  7/1.  This is apparently delayed due to stat studies being ordered from observation unit.  Once it is clear there will be no more interventions, she can be transitioned to oral anticoagulation.  Plan discharged to rehab for ongoing convalescence and antibiotic therapy

## 2022-07-04 NOTE — PLAN OF CARE
Goal Outcome Evaluation:  Plan of Care Reviewed With: patient        Progress: improving  Outcome Evaluation: VSS. PRN norco and zanaflex given as requested by pt. Weight shift assistance provided. +OOB to BSC. Purewick in place. HR controlled between 70-80s. No other changes this shift. CCP working on rehab. WCM

## 2022-07-04 NOTE — PROGRESS NOTES
[facility]    [phone]   LOS: 5 days     Name: Marylu Georges  Age/Sex: 62 y.o. female  :  1960        PCP: Elie Dixon MD  Chief Complaint   Patient presents with   • Arm Swelling      Subjective   Her pain is better controlled today.  Overall she is feeling a lot better denies new issues or complaints this morning.  Tolerating IV antibiotics denies fevers chills nausea vomiting or diarrhea presently.  General: No Fever or Chills, Cardiac: No Chest Pain or Palpitations, Resp: No Cough or SOA, GI: No Nausea, Vomiting, or Diarrhea and Other: No bleeding    cefTRIAXone, 2 g, Intravenous, Q24H  clobetasol, 1 application, Topical, Q12H  digoxin, 250 mcg, Oral, Daily  enoxaparin, 1 mg/kg, Subcutaneous, Q12H  exemestane, 25 mg, Oral, Daily  famotidine, 20 mg, Oral, BID AC  hydrOXYzine pamoate, 25 mg, Oral, TID  lidocaine, 1 patch, Transdermal, Q24H  metoprolol tartrate, 50 mg, Oral, Q12H  sodium chloride, 10 mL, Intravenous, Q12H           Objective   Vital Signs  Temp:  [98.3 °F (36.8 °C)-99.2 °F (37.3 °C)] 98.8 °F (37.1 °C)  Heart Rate:  [83-95] 90  Resp:  [16-18] 16  BP: (112-123)/(73-80) 113/73  Body mass index is 25.02 kg/m².    Intake/Output Summary (Last 24 hours) at 2022 0946  Last data filed at 2022 0924  Gross per 24 hour   Intake 120 ml   Output 600 ml   Net -480 ml       Physical Exam  Vitals reviewed.   Constitutional:       Appearance: Normal appearance.   Cardiovascular:      Rate and Rhythm: Normal rate and regular rhythm.   Pulmonary:      Effort: No respiratory distress.      Breath sounds: Normal breath sounds.   Abdominal:      General: Bowel sounds are normal. There is no distension.      Palpations: Abdomen is soft.   Neurological:      Mental Status: She is alert.           Results Review:       I reviewed the patient's new clinical results.  Results from last 7 days   Lab Units 22  0611 22  0527 22  0419 22  0414 22  0502    WBC 10*3/mm3 6.38 9.15 9.53 10.90* 7.75   HEMOGLOBIN g/dL 11.9* 11.9* 11.5* 12.8 12.0   PLATELETS 10*3/mm3 359 357 299 266 166     Results from last 7 days   Lab Units 07/04/22  0418 07/03/22  0611 07/02/22  0527 07/01/22  0419 06/30/22  0414 06/29/22  0530 06/28/22  0502   SODIUM mmol/L  --  133* 136 134* 137 135* 132*   POTASSIUM mmol/L 5.0 4.4 4.1 4.3 4.1 4.3 3.4*   CHLORIDE mmol/L  --  97* 97* 98 101 101 96*   CO2 mmol/L  --  25.0 28.0 25.7 25.0 23.6 20.0*   BUN mg/dL  --  15 16 17 20 17 18   CREATININE mg/dL  --  0.61 0.72 0.64 0.58 0.68 0.76   CALCIUM mg/dL  --  8.6 8.9 8.7 8.9 9.2 9.0   MAGNESIUM mg/dL 2.1  --   --   --   --   --   --    Estimated Creatinine Clearance: 103.4 mL/min (by C-G formula based on SCr of 0.61 mg/dL).      Assessment & Plan   Active Hospital Problems    Diagnosis  POA   • **Atrial fibrillation (HCC) [I48.91]  Yes   • New onset atrial fibrillation (HCC) [I48.91]  Yes   • Inflammatory arthritis [M19.90]  Yes   • Transaminitis [R74.01]  Yes   • Lymphedema of upper extremity, bilateral [I89.0]  Yes   • Rash [R21]  Yes   • Streptococcal arthritis of left ankle (HCC) [M00.272]  Unknown   • Malignant neoplasm of overlapping sites of left breast in female, estrogen receptor positive (HCC) [C50.812, Z17.0]  Not Applicable      Resolved Hospital Problems   No resolved problems to display.       PLAN  62 y.o. female admitted with Atrial fibrillation (HCC).     · New onset afib with rvr-tsh was normal. Echocardiogram with patent foramen ovale and severely dilated left atrial cavity. Continue digoxin, and metoprolol. On therapeutic lovenox for AC. Will need to transition to an oral AC once it's clear that no further operative interventions or procedures are necessary.   · Left ankle septic arthritis with group c strep-s/p I&D on 6/30/22 by Dr. Tran. On ceftriaxone per ID thru 8/6  · Discitis, myositis of the lumbar spine-spine surgery has evaluated. No surgical intervention is recommend at  "this time. is consulted. On ceftriaxone as above.   · Left shoulder synovitis-went for joint aspiration on 6/30/22, but only scant material was able to be obtained. Culture is negative at 2 days.  · Left hand joint swelling-MRI  ordered to evaluate for septic arthritis, I did call down and discussed with MRI it is unclear whether can get to this today or not.  They have been \"slammed with stat studies from the observation unit\" preventing them from getting to routine inpatient procedures and imaging.  I find this unacceptable and have changed her imaging to STAT since this is holding up her planning for DC and potential  · Polyarticular arthritis-anti ccp negative, RF positive, barbara, lupus, and Sjogren's abs all negative  · Urticarial eruption vs. cellulitis of the bilateral upper extremities and chest-greatly improved with topical steroids, antihistamines, and abx.  · Pain related to the above-continue norco, muscle relaxant, lidocaine patches, bowel regimen.   Is having to set her alarm in the middle the night so that she is able to wake up and take the pain medication to prevent severe rebound pain when she wakes up.  Add ibuprofen tonight at bedtime to see if maybe this gives her some better pain relief throughout the night.  Could consider giving this more frequently during the day she has normal renal function and is otherwise relatively healthy.  · Elevated liver enzymes-acute hepatitis panel was negative. Liver u/s showed some mild dilation of the pancreatic duct. Lipase was slightly elevated. Her LFTs are trending down today and she's not having any abdominal pain/nausea/vomiting, so will hold off on MRCP for the time being unless LFTs worsen again or she develops abdominal symptoms.  · History of left sided breast cancer s/p bilateral mastectomy in 2019, chemo/xrt-aromasin  · History of pericardial mass which resolved with chemotherapy-not seen on TTE this admission  · History of CVA  · Essential " hypertension-adequate control acutely  · therapeutic lovenox for DVT prophylaxis.  · pepcid for gi ppx   · Full code.  · Discussed with patient and nursing staff.  · Anticipate discharge home with HH vs SNU facility timing yet to be determined. pt indicates to me that she's not confident in her ability to safely return home and she's not sure whether her  will be able to adequately take care of her and she would prefer to go to rehab. I will notify CCP.          Luis M Shaw MD  [facility]  07/04/22  09:46 EDT

## 2022-07-04 NOTE — PLAN OF CARE
Goal Outcome Evaluation:    Progress: no change  Outcome Evaluation: Vitals stable. PRN Norco and Zanaflex given per MAR. MRI completed of left hand - ortho paged. IV abx continued. No other complaints. Patient stable and needs met at this time.

## 2022-07-04 NOTE — PROGRESS NOTES
HOSPITAL PROGRESS NOTE    Date of Service: 22  LOS:  LOS: 5 days   Patient Name: Marylu Georges  Age/Sex: 62 y.o. female  : 1960  MRN: [id number]  Primary Cardiologist: Dr. Danni Odell     Subjective:     Chief Complaint/Follow up:   PAF, Diastolic HF    Interval History:   Resting in bed.  Reports 14/10 generalized pain is waiting for pain medication at 1230.  Denies palpitations, chest pain, shortness of breath, or dizziness.  Edema has improved.    Objective:     Objective:  Temp:  [98.3 °F (36.8 °C)-99.2 °F (37.3 °C)] 98.8 °F (37.1 °C)  Heart Rate:  [83-95] 90  Resp:  [16-18] 16  BP: (112-123)/(73-80) 113/73  Body mass index is 25.02 kg/m².    Intake/Output Summary (Last 24 hours) at 2022 1212  Last data filed at 2022 1100  Gross per 24 hour   Intake 120 ml   Output 650 ml   Net -530 ml         22  0734 22  1343   Weight: 70.5 kg (155 lb 5 oz) 70.3 kg (155 lb)     Weight change:     Physical Exam:   General Appearance: Alert, cooperative, in no acute distress. AAOx4.   HEENT: Normocephalic.  Neck: Supple. No JVD.   Lungs: Clear. Normal respiratory effort and rate.  Heart: Irregularly, irregular.  Normal S1 and S2, no murmurs, gallops or rubs.  Abdomen: Soft, nontender, nondistended.   Extremities: Warm, no cyanosis, or clubbing.     Lab Review:   Results from last 7 days   Lab Units 22  0418 22  0611 22  0527   SODIUM mmol/L  --  133* 136   POTASSIUM mmol/L 5.0 4.4 4.1   CHLORIDE mmol/L  --  97* 97*   CO2 mmol/L  --  25.0 28.0   BUN mg/dL  --  15 16   CREATININE mg/dL  --  0.61 0.72   GLUCOSE mg/dL  --  96 111*   CALCIUM mg/dL  --  8.6 8.9   AST (SGOT) U/L  --  41* 61*   ALT (SGPT) U/L  --  50* 61*         Results from last 7 days   Lab Units 22  0611 22  0527   WBC 10*3/mm3 6.38 9.15   HEMOGLOBIN g/dL 11.9* 11.9*   HEMATOCRIT % 35.8 35.6   PLATELETS 10*3/mm3 359 357         Results from last 7 days   Lab Units 22  0418   MAGNESIUM  mg/dL 2.1                   Results for orders placed during the hospital encounter of 06/27/22    Adult Transthoracic Echo Complete W/ Cont if Necessary Per Protocol    Interpretation Summary  · Estimated left ventricular EF = 55% Left ventricular systolic function is normal.  · Left ventricular diastolic function was indeterminate.  · Saline test results are positive with valsalva manuever. Small patent foramen ovale present.  · There is calcification of the aortic valve mainly affecting the non-coronary, left coronary and right coronary cusp(s).  · Estimated right ventricular systolic pressure from tricuspid regurgitation is mildly elevated (35-45 mmHg). Calculated right ventricular systolic pressure from tricuspid regurgitation is 41.1 mmHg.  · The left atrial cavity is severely dilated.    I reviewed the patient's new clinical results.  I personally viewed and interpreted the patient's EKG/Telemetry data/Labs/Test Results.     Current Medications:   Scheduled Meds:cefTRIAXone, 2 g, Intravenous, Q24H  clobetasol, 1 application, Topical, Q12H  digoxin, 250 mcg, Oral, Daily  enoxaparin, 1 mg/kg, Subcutaneous, Q12H  exemestane, 25 mg, Oral, Daily  famotidine, 20 mg, Oral, BID AC  hydrOXYzine pamoate, 25 mg, Oral, TID  lidocaine, 1 patch, Transdermal, Q24H  metoprolol tartrate, 50 mg, Oral, Q12H  sodium chloride, 10 mL, Intravenous, Q12H      Continuous Infusions:     Allergies:  Allergies   Allergen Reactions   • Benadryl [Diphenhydramine] Itching     INCREASES BLOOD PRESSURE   • Erythromycin GI Intolerance   • Levaquin [Levofloxacin] GI Intolerance   • Penicillins GI Intolerance       Assessment:       Atrial fibrillation (HCC)    Malignant neoplasm of overlapping sites of left breast in female, estrogen receptor positive (HCC)    Transaminitis    Lymphedema of upper extremity, bilateral    Rash    Inflammatory arthritis    Streptococcal arthritis of left ankle (HCC)    New onset atrial fibrillation  ([facility])        Plan:   Assessment & Plan       1.  New onset Atrial Fibrillation: Remains in A. fib and rate controlled with digoxin and beta-blocker.  Dig level normal today.  On enoxaparin and start apixaban when okay with the team.    2.  Diastolic heart failure-preserved LVEF on echo.  Euvolemic.  3.  Small PFO noted on echocardiogram  4.  Low-grade temperature-afebrile.  No obvious vegetation noted on TTE.  ID following.  5.  Anemia-stable  6.  Elevated liver enzymes-improved.  7.  Left arm swelling.  Normal bilateral lower extremity venous duplex and upper extremity venous duplex.  8.  Hypoalbuminemia  9.  Reports generalized pain and back spasms.  On pain medication.  10.  Hyponatremia.  11.  Cardiology will follow along.    ISAIAS Feldman  [facility]   07/04/22  12:12 EDT

## 2022-07-05 LAB
ALBUMIN SERPL-MCNC: 3 G/DL (ref 3.5–5.2)
ANION GAP SERPL CALCULATED.3IONS-SCNC: 10 MMOL/L (ref 5–15)
BACTERIA FLD CULT: NORMAL
BACTERIA SPEC ANAEROBE CULT: NORMAL
BASOPHILS # BLD AUTO: 0.05 10*3/MM3 (ref 0–0.2)
BASOPHILS NFR BLD AUTO: 0.8 % (ref 0–1.5)
BUN SERPL-MCNC: 14 MG/DL (ref 8–23)
BUN/CREAT SERPL: 25.5 (ref 7–25)
CALCIUM SPEC-SCNC: 8.7 MG/DL (ref 8.6–10.5)
CHLORIDE SERPL-SCNC: 101 MMOL/L (ref 98–107)
CO2 SERPL-SCNC: 24 MMOL/L (ref 22–29)
CREAT SERPL-MCNC: 0.55 MG/DL (ref 0.57–1)
DEPRECATED RDW RBC AUTO: 43.9 FL (ref 37–54)
EGFRCR SERPLBLD CKD-EPI 2021: 102.5 ML/MIN/1.73
EOSINOPHIL # BLD AUTO: 0.05 10*3/MM3 (ref 0–0.4)
EOSINOPHIL NFR BLD AUTO: 0.8 % (ref 0.3–6.2)
ERYTHROCYTE [DISTWIDTH] IN BLOOD BY AUTOMATED COUNT: 13 % (ref 12.3–15.4)
GLUCOSE SERPL-MCNC: 93 MG/DL (ref 65–99)
GRAM STN SPEC: NORMAL
HCT VFR BLD AUTO: 34.6 % (ref 34–46.6)
HGB BLD-MCNC: 11.3 G/DL (ref 12–15.9)
IMM GRANULOCYTES # BLD AUTO: 0.04 10*3/MM3 (ref 0–0.05)
IMM GRANULOCYTES NFR BLD AUTO: 0.7 % (ref 0–0.5)
LYMPHOCYTES # BLD AUTO: 1.03 10*3/MM3 (ref 0.7–3.1)
LYMPHOCYTES NFR BLD AUTO: 17.2 % (ref 19.6–45.3)
MCH RBC QN AUTO: 30.4 PG (ref 26.6–33)
MCHC RBC AUTO-ENTMCNC: 32.7 G/DL (ref 31.5–35.7)
MCV RBC AUTO: 93 FL (ref 79–97)
MONOCYTES # BLD AUTO: 0.42 10*3/MM3 (ref 0.1–0.9)
MONOCYTES NFR BLD AUTO: 7 % (ref 5–12)
NEUTROPHILS NFR BLD AUTO: 4.41 10*3/MM3 (ref 1.7–7)
NEUTROPHILS NFR BLD AUTO: 73.5 % (ref 42.7–76)
NRBC BLD AUTO-RTO: 0 /100 WBC (ref 0–0.2)
PHOSPHATE SERPL-MCNC: 3.9 MG/DL (ref 2.5–4.5)
PLATELET # BLD AUTO: 415 10*3/MM3 (ref 140–450)
PMV BLD AUTO: 8.9 FL (ref 6–12)
POTASSIUM SERPL-SCNC: 4.5 MMOL/L (ref 3.5–5.2)
RBC # BLD AUTO: 3.72 10*6/MM3 (ref 3.77–5.28)
SODIUM SERPL-SCNC: 135 MMOL/L (ref 136–145)
WBC NRBC COR # BLD: 6 10*3/MM3 (ref 3.4–10.8)

## 2022-07-05 PROCEDURE — 80069 RENAL FUNCTION PANEL: CPT | Performed by: HOSPITALIST

## 2022-07-05 PROCEDURE — 97530 THERAPEUTIC ACTIVITIES: CPT

## 2022-07-05 PROCEDURE — 99232 SBSQ HOSP IP/OBS MODERATE 35: CPT | Performed by: ORTHOPAEDIC SURGERY

## 2022-07-05 PROCEDURE — 99232 SBSQ HOSP IP/OBS MODERATE 35: CPT | Performed by: INTERNAL MEDICINE

## 2022-07-05 PROCEDURE — 25010000002 ENOXAPARIN PER 10 MG: Performed by: NURSE PRACTITIONER

## 2022-07-05 PROCEDURE — 85025 COMPLETE CBC W/AUTO DIFF WBC: CPT | Performed by: HOSPITALIST

## 2022-07-05 PROCEDURE — 25010000002 CEFTRIAXONE PER 250 MG: Performed by: HOSPITALIST

## 2022-07-05 RX ORDER — DICLOFENAC SODIUM 75 MG/1
TABLET, DELAYED RELEASE ORAL
Qty: 60 TABLET | Refills: 3 | OUTPATIENT
Start: 2022-07-05 | End: 2022-07-13 | Stop reason: HOSPADM

## 2022-07-05 RX ORDER — DIGOXIN 125 MCG
125 TABLET ORAL
Status: DISCONTINUED | OUTPATIENT
Start: 2022-07-05 | End: 2022-07-13 | Stop reason: HOSPADM

## 2022-07-05 RX ADMIN — HYDROCODONE BITARTRATE AND ACETAMINOPHEN 1 TABLET: 7.5; 325 TABLET ORAL at 17:34

## 2022-07-05 RX ADMIN — FAMOTIDINE 20 MG: 20 TABLET ORAL at 17:34

## 2022-07-05 RX ADMIN — LIDOCAINE 1 PATCH: 50 PATCH TOPICAL at 20:04

## 2022-07-05 RX ADMIN — ENOXAPARIN SODIUM 70 MG: 100 INJECTION SUBCUTANEOUS at 21:48

## 2022-07-05 RX ADMIN — METOPROLOL TARTRATE 50 MG: 25 TABLET, FILM COATED ORAL at 20:04

## 2022-07-05 RX ADMIN — HYDROCODONE BITARTRATE AND ACETAMINOPHEN 1 TABLET: 7.5; 325 TABLET ORAL at 13:22

## 2022-07-05 RX ADMIN — HYDROXYZINE PAMOATE 25 MG: 25 CAPSULE ORAL at 20:04

## 2022-07-05 RX ADMIN — TIZANIDINE 4 MG: 4 TABLET ORAL at 14:30

## 2022-07-05 RX ADMIN — TIZANIDINE 4 MG: 4 TABLET ORAL at 08:51

## 2022-07-05 RX ADMIN — CLOBETASOL PROPIONATE 1 APPLICATION: 0.5 CREAM TOPICAL at 20:05

## 2022-07-05 RX ADMIN — HYDROCODONE BITARTRATE AND ACETAMINOPHEN 1 TABLET: 7.5; 325 TABLET ORAL at 09:41

## 2022-07-05 RX ADMIN — HYDROXYZINE PAMOATE 25 MG: 25 CAPSULE ORAL at 16:26

## 2022-07-05 RX ADMIN — CEFTRIAXONE SODIUM 2 G: 2 INJECTION, POWDER, FOR SOLUTION INTRAMUSCULAR; INTRAVENOUS at 12:45

## 2022-07-05 RX ADMIN — TIZANIDINE 4 MG: 4 TABLET ORAL at 20:04

## 2022-07-05 RX ADMIN — HYDROCODONE BITARTRATE AND ACETAMINOPHEN 1 TABLET: 7.5; 325 TABLET ORAL at 21:42

## 2022-07-05 RX ADMIN — Medication 10 ML: at 20:05

## 2022-07-05 RX ADMIN — DIGOXIN 125 MCG: 125 TABLET ORAL at 13:22

## 2022-07-05 RX ADMIN — METOPROLOL TARTRATE 50 MG: 25 TABLET, FILM COATED ORAL at 08:52

## 2022-07-05 RX ADMIN — IBUPROFEN 600 MG: 600 TABLET, FILM COATED ORAL at 18:11

## 2022-07-05 RX ADMIN — HYDROXYZINE PAMOATE 25 MG: 25 CAPSULE ORAL at 08:52

## 2022-07-05 RX ADMIN — EXEMESTANE 25 MG: 25 TABLET, FILM COATED ORAL at 14:30

## 2022-07-05 RX ADMIN — CLOBETASOL PROPIONATE 1 APPLICATION: 0.5 CREAM TOPICAL at 08:53

## 2022-07-05 RX ADMIN — HYDROCODONE BITARTRATE AND ACETAMINOPHEN 1 TABLET: 7.5; 325 TABLET ORAL at 04:00

## 2022-07-05 NOTE — PROGRESS NOTES
Orthopedic Progress Note      Patient: Marylu Georges  Date of Admission: 6/27/2022  YOB: 1960  Medical Record Number: [id number]    POD # :  5 Days Post-Op Procedure(s) (LRB):  INCISION AND DRAINAGE left ankle (Left)    Patient seen exam this morning.  Resting well.  Reports overall she feels like she is improved heart function doing well when she was weightbearing some more.  Regards to her ankle she feels its little stiff but pain is much improved some discomfort and pain at the plantar aspect of the foot when she is weightbearing.  She is moving her upper extremities well.  Does continue with some left hand swelling and limited motion but overall pain is controlled.  Most of her discomfort is still her back when she is having spasms.  She states when she gets her medicines on timely basis she does just fine.    Physical Exam:  62 y.o.  female  Vitals:  Temp:  [98.1 °F (36.7 °C)-98.9 °F (37.2 °C)] 98.4 °F (36.9 °C)  Heart Rate:  [63-91] 80  Resp:  [16-18] 18  BP: ()/(56-81) 133/66  alert and oriented    Bilateral upper extremities patient is moving well no overt tenderness to palpation moving elbow and shoulders and wrist well.    Left hand still demonstrates some swelling along the second and third MCPs about the index and middle fingers.  Patient has difficulty flexing much of the MCPs due to swelling still able to flex some of the PIP and DIPs.  Sensation tact distally.  No overt wrist pain or swelling noted.    Left lower extremity examined dressings taken down about the ankle incisions healing well no signs symptoms of drainage or infection still some generalized swelling although seems to be dependent in nature.  No overt tenderness palpation on the ankle joint.  Ankle still slightly stiff encouraged motion she is able to wiggle her toes and has sensation tact distally.  Compartments soft compressible.  2+ pedal pulses    Data Review     No results displayed because visit has over  200 results.          No results found.    Medications:  cefTRIAXone, 2 g, Intravenous, Q24H  clobetasol, 1 application, Topical, Q12H  digoxin, 250 mcg, Oral, Daily  enoxaparin, 1 mg/kg, Subcutaneous, Q12H  exemestane, 25 mg, Oral, Daily  famotidine, 20 mg, Oral, BID AC  hydrOXYzine pamoate, 25 mg, Oral, TID  ibuprofen, 600 mg, Oral, Daily  lidocaine, 1 patch, Transdermal, Q24H  metoprolol tartrate, 50 mg, Oral, Q12H  sodium chloride, 10 mL, Intravenous, Q12H      •  senna-docusate sodium **AND** polyethylene glycol **AND** bisacodyl **AND** bisacodyl  •  HYDROcodone-acetaminophen  •  nitroglycerin  •  ondansetron **OR** ondansetron  •  simethicone  •  [COMPLETED] Insert peripheral IV **AND** sodium chloride  •  sodium chloride  •  tiZANidine  •  traZODone      Imaging: MRI LEFT HAND WITH AND WITHOUT CONTRAST     HISTORY: 62-year-old female with streptococcal arthritis of the left  ankle. Disc space infection in lumbar spine. Left hand joint swelling.     TECHNIQUE: MRI left hand includes axial T1, T2, T1 fat-sat as well as  coronal T1, STIR and sagittal PD weighted sequences. Gadolinium was  administered intravenously followed by axial and coronal T1  fat-saturated sequences.     COMPARISON: X-rays of the left hand 06/27/2022.     FINDINGS: There is abnormal capsular/synovial thickening and enhancement  at the 2nd MCP joint. There is also periarticular soft tissue edema and  enhancement, and this combination of findings is consistent with septic  arthritis of the 2nd MCP joint. There is capsular distention extending  dorsal and medial to the joint where there is peripheral enhancing fluid  that extends 11 mm in AP dimension by 9 mm transverse extending medial  to the 2nd extensor tendon. Small pockets of fluid are present palmar to  the neck of the 2nd metacarpal. Second MCP joint effusion is present and  there is joint space narrowing. There is also mild enhancement within  the lateral aspect of the base of the  2nd proximal phalanx and 2nd  metacarpal head suspicious for surrounding osteomyelitis. Edema is  present within the subcutaneous fat dorsal to the metacarpal.     There is nonuniform joint space narrowing at the interphalangeal joints  with a pattern of central erosions that appears greatest at the 3rd and  5th DIP joints and this is consistent with erosive osteoarthritis. Mild  joint space narrowing is present in the 3rd MCP joint which is an  atypical location for osteoarthritis and most likely associated with  CPPD arthropathy. X-rays demonstrate chronic calcinosis overlying the  triangular fibrocartilage. There are also advanced arthritic changes at  the 1st CMC joint where there is joint space loss.     IMPRESSION:  1. Suspected septic arthritis at the 2nd MCP joint where there is joint  space narrowing with joint effusion and surrounding synovitis and soft  tissue inflammation. Mild marrow edema within the radial aspects of the  2nd metacarpal head and base of the 2nd proximal phalanx suspicious for  early osteomyelitis though no cortical loss is demonstrated.   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 There are also advanced arthritic  changes at the 1st CMC joint.     This report was finalized on 7/4/2022 4:38 PM by Dr. Andrae Rivas M.D.           Assessment:  Doing well POD  # 5 Days Post-Op Procedure(s) (LRB):  INCISION AND DRAINAGE left ankle (Left)  Problems Addressed this Visit        Cardiac and Vasculature    * (Principal) Atrial fibrillation (HCC)       Other    Streptococcal arthritis of left ankle (HCC) - Primary    Relevant Orders    Case Request (Completed)      Other Visit Diagnoses     Elevated LFTs        Elevated C-reactive protein (CRP)        Elevated sed rate        Edema of both upper extremities          Diagnoses       Codes Comments    Streptococcal arthritis of left ankle (HCC)    -  Primary ICD-10-CM: M00.272  ICD-9-CM:  711.07, 041.00     Atrial fibrillation, unspecified type (HCC)     ICD-10-CM: I48.91  ICD-9-CM: 427.31     Elevated LFTs     ICD-10-CM: R79.89  ICD-9-CM: 790.6     Elevated C-reactive protein (CRP)     ICD-10-CM: R79.82  ICD-9-CM: 790.95     Elevated sed rate     ICD-10-CM: R70.0  ICD-9-CM: 790.1     Edema of both upper extremities     ICD-10-CM: R60.0  ICD-9-CM: 782.3           Plan:    Regards the patient's hand given the MRI report and potential complexity in this area I talked to Dr. Bipin Lazar and made a consult request for him.  He states he be happy to see the patient some himself or one of the numbers from his group we will plan to see the patient later this morning or early afternoon.  They requested she remain n.p.o. at this time.    Regards her ankle she is healing well.  Encourage continue motion and elevation to work on swelling and function.    Upper extremities except for the hand as noted above seem to be much improved she is moving elbow and shoulders around well with no pain or discomfort.    Spine surgery had seen and examined the patient and recommendations to continue conservative management this time.      Antibiotics per infectious disease.    Physical therapy for mobilization.  Patient is weightbearing as tolerated to left lower extremity      Continue n.p.o. until recommendations from hand surgery given.    Patient will need to follow-up with me 2 weeks from operative date of her ankle.  Call office to schedule appointment [phone]      We will continue to monitor while here    Please call with any questions or concerns thank you      Kenneth Tran MD    Date: 7/5/2022  Time: 10:23 EDT

## 2022-07-05 NOTE — PLAN OF CARE
Goal Outcome Evaluation:  Plan of Care Reviewed With: patient        Progress: improving  Outcome Evaluation: PRN meds Norco Zanaflex given for pain managment. +Urine output; bath given per pt request. No complaints. VSS, HR improving, 80-90s, will continue to monitor.

## 2022-07-05 NOTE — SIGNIFICANT NOTE
07/05/22 1531   OTHER   Discipline occupational therapist   Rehab Time/Intention   Session Not Performed patient/family declined treatment  (Upon entering room, pt just returned to supine with PT. Unable to progress with mobility and declined further activity due to high levels of pain with weight bearing to LLE. OT to f/u in AM.)   Recommendation   OT - Next Appointment 07/06/22

## 2022-07-05 NOTE — PLAN OF CARE
Goal Outcome Evaluation:  Plan of Care Reviewed With: patient        Progress: no change  Outcome Evaluation: no acute changes today-pain continues to be an issue. medicated per MAR. no surgery per Dr. Voss. awaiting discharge planning. VSS. continue to monitor

## 2022-07-05 NOTE — PROGRESS NOTES
Orthopedic Progress Note      Patient: Marylu Georges    YOB: 1960    Medical Record Number: [id number]    Attending Physician: Luis M Shaw MD    Date of Admission: 6/27/2022  6:51 AM    Admitting Dx:  Elevated C-reactive protein (CRP) [R79.82]  Elevated sed rate [R70.0]  Elevated LFTs [R79.89]  Edema of both upper extremities [R60.0]  Atrial fibrillation, unspecified type (HCC) [I48.91]  New onset atrial fibrillation (HCC) [I48.91]      Current Problem List:   Patient Active Problem List   Diagnosis    Malignant neoplasm of overlapping sites of left breast in female, estrogen receptor positive (HCC)    Family history of breast cancer in female    Syncope    Malignant neoplasm of overlapping sites of both breasts in female, estrogen receptor positive (HCC)    Hypertension    Encounter for long-term (current) use of other medications    Drug-induced hepatitis    Atrial fibrillation (HCC)    Transaminitis    Lymphedema of upper extremity, bilateral    Rash    Inflammatory arthritis    Streptococcal arthritis of left ankle (HCC)    New onset atrial fibrillation (HCC)         Past Medical History:   Diagnosis Date    Anemia in neoplastic disease     Arthritis     Breast cancer (HCC)     Left    CVA (cerebral vascular accident) (HCC)     Hip pain     RIGHT HIP... CYST    History of fracture of leg 1987    History of radiation therapy     LAST TREATMENT      Hypertension     Limited joint range of motion (ROM)     RIGHT HIP    Skin sore     OPEN SORE LEFT BREAST    Syncope     Vertigo        Current Medications:  Scheduled Meds:cefTRIAXone, 2 g, Intravenous, Q24H  clobetasol, 1 application, Topical, Q12H  digoxin, 125 mcg, Oral, Daily  enoxaparin, 1 mg/kg, Subcutaneous, Q12H  exemestane, 25 mg, Oral, Daily  famotidine, 20 mg, Oral, BID AC  hydrOXYzine pamoate, 25 mg, Oral, TID  ibuprofen, 600 mg, Oral, Daily  lidocaine, 1 patch, Transdermal, Q24H  metoprolol tartrate, 50 mg, Oral,  Q12H  sodium chloride, 10 mL, Intravenous, Q12H      PRN Meds:.  senna-docusate sodium **AND** polyethylene glycol **AND** bisacodyl **AND** bisacodyl    HYDROcodone-acetaminophen    nitroglycerin    ondansetron **OR** ondansetron    simethicone    [COMPLETED] Insert peripheral IV **AND** sodium chloride    sodium chloride    tiZANidine    traZODone    SUBJECTIVE: 62 y.o.  female awake and alert.  Complaining of left hand pain.  OBJECTIVE:   Vitals:    07/04/22 1301 07/04/22 1952 07/04/22 2337 07/05/22 0801   BP: 116/56 118/81 96/69 133/66   BP Location: Right arm Right arm Right arm Right arm   Patient Position: Lying Lying Lying Lying   Pulse: 77 80 63 80   Resp: 16 16 18 18   Temp: 98.4 °F (36.9 °C) 98.9 °F (37.2 °C) 98.1 °F (36.7 °C) 98.4 °F (36.9 °C)   TempSrc: Oral Oral Oral Oral   SpO2: 98% 98% 92% 96%   Weight:       Height:         I/O last 3 completed shifts:  In: 300 [P.O.:300]  Out: 1500 [Urine:1500]    Diagnostic Tests:   Lab Results (last 24 hours)       Procedure Component Value Units Date/Time    Anaerobic Culture - Aspirate, Shoulder, Left [[id number]] Collected: 06/30/22 1250    Specimen: Aspirate from Shoulder, Left Updated: 07/05/22 0958     Anaerobic Culture No anaerobes isolated at 5 days    Body Fluid Culture - Aspirate, Shoulder, Left [[id number]] Collected: 06/30/22 1250    Specimen: Aspirate from Shoulder, Left Updated: 07/05/22 0840     Body Fluid Culture No growth at 5 days     Gram Stain No WBCs or organisms seen    Renal Function Panel [[id number]]  (Abnormal) Collected: 07/05/22 0554    Specimen: Blood Updated: 07/05/22 0702     Glucose 93 mg/dL      BUN 14 mg/dL      Creatinine 0.55 mg/dL      Sodium 135 mmol/L      Potassium 4.5 mmol/L      Chloride 101 mmol/L      CO2 24.0 mmol/L      Calcium 8.7 mg/dL      Albumin 3.00 g/dL      Phosphorus 3.9 mg/dL      Anion Gap 10.0 mmol/L      BUN/Creatinine Ratio 25.5     eGFR 102.5 mL/min/1.73      Comment: National Kid[employer] and  "American Society of Nephrology (ASN) Task Force recommended calculation based on the Chronic Kidney Disease Epidemiology Collaboration (CKD-EPI) equation refit without adjustment for race.       Narrative:      GFR Normal >60  Chronic Kidney Disease <60  Kidney Failure <15      CBC & Differential [[id number]]  (Abnormal) Collected: 07/05/22 0554    Specimen: Blood Updated: 07/05/22 0633    Narrative:      The following orders were created for panel order CBC & Differential.  Procedure                               Abnormality         Status                     ---------                               -----------         ------                     CBC Auto Differential[[id number]]        Abnormal            Final result                 Please view results for these tests on the individual orders.    CBC Auto Differential [[id number]]  (Abnormal) Collected: 07/05/22 0554    Specimen: Blood Updated: 07/05/22 0633     WBC 6.00 10*3/mm3      RBC 3.72 10*6/mm3      Hemoglobin 11.3 g/dL      Hematocrit 34.6 %      MCV 93.0 fL      MCH 30.4 pg      MCHC 32.7 g/dL      RDW 13.0 %      RDW-SD 43.9 fl      MPV 8.9 fL      Platelets 415 10*3/mm3      Neutrophil % 73.5 %      Lymphocyte % 17.2 %      Monocyte % 7.0 %      Eosinophil % 0.8 %      Basophil % 0.8 %      Immature Grans % 0.7 %      Neutrophils, Absolute 4.41 10*3/mm3      Lymphocytes, Absolute 1.03 10*3/mm3      Monocytes, Absolute 0.42 10*3/mm3      Eosinophils, Absolute 0.05 10*3/mm3      Basophils, Absolute 0.05 10*3/mm3      Immature Grans, Absolute 0.04 10*3/mm3      nRBC 0.0 /100 WBC             PHYSICAL EXAM: Patient states that she continues to have back pain.  Is worse when she tries to move.  She also has some intermittent \"sciatica\" to both legs but not on a constant basis.  Strength is equal bilaterally.  She has good quad strength.  Patient is anxious to get up and move around more.  Does have some swelling in her left hand and is awaiting for hand surgery " to see their recommendations.  Her left ankle incision is intact without any erythema.  She does have some swelling to the dorsum of her foot but otherwise is neurovascularly intact.    ASSESSMENT & PLAN:  Continue IV antibiotics as recommended by ID.  Will continue to follow.  Would not recommend surgical intervention for her discitis due to multilevel involvement.  Patient does appear comfortable in bed at present.  Does get good relief of her pain with medication.  Will continue to follow.      Date: 7/5/2022    Maria Isabel Tam RN

## 2022-07-05 NOTE — THERAPY TREATMENT NOTE
Patient Name: Marylu Georges  : 1960    MRN: [id number]                              Today's Date: 2022       Admit Date: 2022    Visit Dx:     ICD-10-CM ICD-9-CM   1. Streptococcal arthritis of left ankle (HCC)  M00.272 711.07     041.00   2. Atrial fibrillation, unspecified type (HCC)  I48.91 427.31   3. Elevated LFTs  R79.89 790.6   4. Elevated C-reactive protein (CRP)  R79.82 790.95   5. Elevated sed rate  R70.0 790.1   6. Edema of both upper extremities  R60.0 782.3     Patient Active Problem List   Diagnosis   • Malignant neoplasm of overlapping sites of left breast in female, estrogen receptor positive (HCC)   • Family history of breast cancer in female   • Syncope   • Malignant neoplasm of overlapping sites of both breasts in female, estrogen receptor positive (HCC)   • Hypertension   • Encounter for long-term (current) use of other medications   • Drug-induced hepatitis   • Atrial fibrillation (HCC)   • Transaminitis   • Lymphedema of upper extremity, bilateral   • Rash   • Inflammatory arthritis   • Streptococcal arthritis of left ankle (HCC)   • New onset atrial fibrillation (HCC)     Past Medical History:   Diagnosis Date   • Anemia in neoplastic disease    • Arthritis    • Breast cancer (HCC)     Left   • CVA (cerebral vascular accident) (HCC)    • Hip pain     RIGHT HIP... CYST   • History of fracture of leg    • History of radiation therapy     LAST TREATMENT     • Hypertension    • Limited joint range of motion (ROM)     RIGHT HIP   • Skin sore     OPEN SORE LEFT BREAST   • Syncope    • Vertigo      Past Surgical History:   Procedure Laterality Date   • AXILLARY LYMPH NODE BIOPSY/EXCISION Right     LYMPH NODE UNDER RIGHT ARM-MALIGNANT (DOUBLE MASTECTOMY)   • BREAST BIOPSY Left     MALIGNANT   • FRACTURE SURGERY      Leg   • HARDWARE REMOVAL     • INCISION AND DRAINAGE LEG Left 2022    Procedure: INCISION AND DRAINAGE left ankle;  Surgeon: Kenneth Tran MD;   Location: [facility] MAIN OR;  Service: Orthopedics;  Laterality: Left;   • MASTECTOMY W/ SENTINEL NODE BIOPSY Bilateral 9/16/2019    Procedure: BILATERLA MODIFIED RADICAL MASTECTOMY WITH BILATERAL SENTINEL LYMPH NODE BIOPSY;  Surgeon: Joby Barron Jr., MD;  Location: [facility] MAIN OR;  Service: General   • TOTAL HIP ARTHROPLASTY Right 3/2/2020    Procedure: RIGHT TOTAL HIP ARTHROPLASTY NATALY NAVIGATION;  Surgeon: Luis M Leonard MD;  Location: [facility] MAIN OR;  Service: Orthopedics;  Laterality: Right;   • TOTAL HIP ARTHROPLASTY Left 7/20/2021    Procedure: Posterior LEFT TOTAL HIP ARTHROPLASTY NATALY NAVIGATION;  Surgeon: Luis M Leonard MD;  Location: [facility] MAIN OR;  Service: Orthopedics;  Laterality: Left;   • VENOUS ACCESS DEVICE (PORT) INSERTION Right 2/1/2019    Procedure: INSERTION VENOUS ACCESS DEVICE;  Surgeon: Joby Barron Jr., MD;  Location: [facility];  Service: General   • VENOUS ACCESS DEVICE (PORT) REMOVAL N/A 10/30/2019    Procedure: Mediport Removal;  Surgeon: Joby Barron Jr., MD;  Location: [facility] OR;  Service: General      General Information     Row Name 07/05/22 1535          Physical Therapy Time and Intention    Document Type therapy note (daily note)  -PH     Mode of Treatment physical therapy  -PH     Row Name 07/05/22 1535          General Information    Existing Precautions/Restrictions fall  -PH     Row Name 07/05/22 1535          Safety Issues, Functional Mobility    Impairments Affecting Function (Mobility) balance;coordination;endurance/activity tolerance;pain;range of motion (ROM);strength  -PH     Comment, Safety Issues/Impairments (Mobility) gt belt and non skid socks worn for pt safety  -PH           User Key  (r) = Recorded By, (t) = Taken By, (c) = Cosigned By    Initials Name Provider Type    PH Amalia Yap PTA Physical Therapist Assistant               Mobility     Row Name 07/05/22 1536          Bed Mobility    Bed Mobility supine-sit  -PH      "Supine-Sit [place] (Bed Mobility) minimum assist (75% patient effort);verbal cues;nonverbal cues (demo/gesture)  -PH     Sit-Supine [place] (Bed Mobility) supervision;verbal cues  -PH     Assistive Device (Bed Mobility) bed rails;head of bed elevated  -PH     Comment, (Bed Mobility) extra time to exit/enter bed; pt reporting significant pain w/ activity  -PH     Row Name 07/05/22 1536          Sit-Stand Transfer    Sit-Stand [place] (Transfers) contact guard  -PH     Assistive Device (Sit-Stand Transfers) walker, front-wheeled  -PH     Comment, (Sit-Stand Transfer) pt not bearing wt on L foot although touching floor  -PH     Row Name 07/05/22 1536          Gait/Stairs (Locomotion)    [place] Level (Gait) contact guard;verbal cues  -PH     Assistive Device (Gait) walker, front-wheeled  -PH     Distance in Feet (Gait) 8'  -PH     Deviations/Abnormal Patterns (Gait) antalgic  -PH     [place] Level (Stairs) unable to assess  -PH     Comment, (Gait/Stairs) pt c/o \" back spasm\" and unable to bear wt on L foot 2/2 pain; pt limited by pain  -PH     Row Name 07/05/22 1536          Mobility    Extremity Weight-bearing Status left lower extremity  -PH     Left Lower Extremity (Weight-bearing Status) weight-bearing as tolerated (WBAT)  -PH           User Key  (r) = Recorded By, (t) = Taken By, (c) = Cosigned By    Initials Name Provider Type    PH Amalia Yap PTA Physical Therapist Assistant               Obj/Interventions     Row Name 07/05/22 1538          Motor Skills    Therapeutic Exercise other (see comments)  L LAQ, B hip abd, B SLR; x 5 reps all  -PH     Row Name 07/05/22 1538          Balance    Balance Assessment sitting static balance;standing static balance  -PH     Static Sitting Balance standby assist  -PH     Static Standing Balance contact guard  -PH     Position/Device Used, Standing Balance walker, front-wheeled  -PH     Comment, Balance mild unsteadiness when amb w/ no " LOB; Difficulty wt shifting on either foot 2/2 pain  -PH           User Key  (r) = Recorded By, (t) = Taken By, (c) = Cosigned By    Initials Name Provider Type    Amalia Johnson PTA Physical Therapist Assistant               Goals/Plan    No documentation.                Clinical Impression     Row Name 07/05/22 1539          Pain    Pre/Posttreatment Pain Comment Pt reports pain w/ activity  -PH     Pain Intervention(s) Rest;Repositioned  -PH     Row Name 07/05/22 7332          Plan of Care Review    Plan of Care Reviewed With patient  -PH     Progress no change  -PH     Outcome Evaluation Pt in bed at beg of PT session. Pt sat up to EOB w/ extra time and min A. Pt stood req CGA and use of fww. Pt c/o pain in B feet when standing w/ limited wt bearing on L foot. Pt amb 8' req CGA and use of fww. Mild unsteadiness noted w/ heavy use of B UE for support. Pt returned to bed req SV. Discussed w/ pt that to go to acute rehab, she will need to be able to participate in 3 hrs of therapy. Pt believes that her pain needs to be under better control. Discussed w/ RN who stated pt had taken all her available meds prior to therapy session. PT will follow up tomorrow for PT / OT co-treat.  -PH     Row Name 07/05/22 1538          Positioning and Restraints    Pre-Treatment Position in bed  -PH     Post Treatment Position bed  -PH     In Bed fowlers;call light within reach;encouraged to call for assist;exit alarm on  -PH           User Key  (r) = Recorded By, (t) = Taken By, (c) = Cosigned By    Initials Name Provider Type    Amalia Johnson PTA Physical Therapist Assistant               Outcome Measures     Row Name 07/05/22 1381          How much help from another person do you currently need...    Turning from your back to your side while in flat bed without using bedrails? 3  -PH     Moving from lying on back to sitting on the side of a flat bed without bedrails? 3  -PH     Moving to and from a bed to a  chair (including a wheelchair)? 3  -PH     Standing up from a chair using your arms (e.g., wheelchair, bedside chair)? 3  -PH     Climbing 3-5 steps with a railing? 2  -PH     To walk in hospital room? 3  -PH     AM-PAC 6 Clicks Score (PT) 17  -PH     Highest level of mobility 5 --> Static standing  -PH     Row Name 07/05/22 1544          Functional Assessment    Outcome Measure Options AM-PAC 6 Clicks Basic Mobility (PT)  -PH           User Key  (r) = Recorded By, (t) = Taken By, (c) = Cosigned By    Initials Name Provider Type    PH Amalia Yap, MELVA Physical Therapist Assistant                             Physical Therapy Education                 Title: PT OT SLP Therapies (Done)     Topic: Physical Therapy (Done)     Point: Mobility training (Done)     Learning Progress Summary           Patient Acceptance, E,D, VU by  at 7/5/2022 1544    Acceptance, E,D, Bed IU by  at 7/3/2022 1335    Acceptance, E,TB, VU by  at 7/3/2022 0437    Acceptance, D,E, VU,NR by MS at 7/1/2022 1119    Acceptance, E,D, VU,NR by MS at 6/28/2022 1123                   Point: Home exercise program (Done)     Learning Progress Summary           Patient Acceptance, E,D, VU by  at 7/5/2022 1544                   Point: Body mechanics (Done)     Learning Progress Summary           Patient Acceptance, E,D, VU by  at 7/5/2022 1544    Acceptance, E,D, Bed IU by  at 7/3/2022 1335    Acceptance, E,TB, VU by  at 7/3/2022 0437    Acceptance, D,E, VU,NR by MS at 7/1/2022 1119    Acceptance, E,D, VU,NR by MS at 6/28/2022 1123                   Point: Precautions (Done)     Learning Progress Summary           Patient Acceptance, E,D, VU by  at 7/5/2022 1544    Acceptance, E,TB, VU by  at 7/3/2022 0437    Acceptance, D,E, VU,NR by MS at 7/1/2022 1119    Acceptance, E,D, VU,NR by MS at 6/28/2022 1123                               User Key     Initials Effective Dates Name Provider Type Discipline    MS 06/16/21 -  Paul  Hardeep VALENCIA, PT Physical Therapist PT    RS 06/16/21 -  Thais Deutsch PT Physical Therapist PT     06/16/21 -  Amalia Yap PTA Physical Therapist Assistant PT     06/16/21 -  Emma Hopper, RN Registered Nurse Nurse              PT Recommendation and Plan     Plan of Care Reviewed With: patient  Progress: no change  Outcome Evaluation: Pt in bed at beg of PT session. Pt sat up to EOB w/ extra time and min A. Pt stood req CGA and use of fww. Pt c/o pain in B feet when standing w/ limited wt bearing on L foot. Pt amb 8' req CGA and use of fww. Mild unsteadiness noted w/ heavy use of B UE for support. Pt returned to bed req SV. Discussed w/ pt that to go to acute rehab, she will need to be able to participate in 3 hrs of therapy. Pt believes that her pain needs to be under better control. Discussed w/ RN who stated pt had taken all her available meds prior to therapy session. PT will follow up tomorrow for PT / OT co-treat.     Time Calculation:    PT Charges     Row Name 07/05/22 1544             Time Calculation    Start Time 1509  -PH      Stop Time 1534  -PH      Time Calculation (min) 25 min  -PH      PT Received On 07/05/22  -PH      PT - Next Appointment 07/06/22  -PH              Timed Charges    09907 - PT Therapeutic Activity Minutes 25  -PH              Total Minutes    Timed Charges Total Minutes 25  -PH       Total Minutes 25  -PH            User Key  (r) = Recorded By, (t) = Taken By, (c) = Cosigned By    Initials Name Provider Type    PH Amalia Yap PTA Physical Therapist Assistant              Therapy Charges for Today     Code Description Service Date Service Provider Modifiers Qty    00561465605 HC PT THERAPEUTIC ACT EA 15 MIN 7/5/2022 Amalia Yap PTA GP 2          PT G-Codes  Outcome Measure Options: AM-PAC 6 Clicks Basic Mobility (PT)  AM-PAC 6 Clicks Score (PT): 17  AM-PAC 6 Clicks Score (OT): 10  Modified Susan Scale: 4 - Moderately severe disability.   Unable to walk without assistance, and unable to attend to own bodily needs without assistance.    Amalia Yap, PTA  7/5/2022

## 2022-07-05 NOTE — CASE MANAGEMENT/SOCIAL WORK
Continued Stay Note  [facility]     Patient Name: Marylu Georges  MRN: [id number]  Today's Date: 7/5/2022    Admit Date: 6/27/2022     Discharge Plan     Row Name 07/05/22 0950       Plan    Plan DC to accepting acute care facility    Plan Comments CCP attempted to call pt in her room, no answer. CCP left vm on her cell phone.  CCP called pts spouse and discussed dc plan.  Pt is wanting to go to acute rehab for ongoing therapy and IV antibx.  Spouse does not know which facilities she wants to be referred to.  He will talk to her today and call me back.  CCP called [facility] and spoke to Lucy. She will evaluate pt to see is she will qualify and call me back.  CCP will continue to follow.  Juana Tay  12:06 EDT CCP spoke to pt on her cell phone, introduced self and role. She is requesting facilities (SNF) close to her home.  CCP made referrals to [facility], [facility], and [facility].CCP called Denise at [facility] and she will review referral and give a call back.   CCP called [phone] and left her a voice mail  CCP called Mr. Bach of [facility]-[phone] and he will pull her referral now and look her up in Epic.  (all three of these facilities take [employer] insurance). CCP will continue to follow.  Juana Tay                 Discharge Codes    No documentation.               Expected Discharge Date and Time     Expected Discharge Date Expected Discharge Time    Jul 5, 2022             Juana Jones

## 2022-07-05 NOTE — PROGRESS NOTES
"Patient Name: Marylu Georges  :1960  62 y.o.      Patient Care Team:  Elie Dixon MD as PCP - General (Hematology and Oncology)  Joby Barron Jr., MD as Referring Physician (General Surgery)  Elie Dixon MD as Consulting Physician (Hematology and Oncology)  Pedro Marmolejo MD as Consulting Physician (Radiation Oncology)    Interval History:   She reports left hand pain.  Waiting for Dr. Voss to see her.    Subjective:  Following for A. fib    Objective   Vital Signs  Temp:  [98.1 °F (36.7 °C)-98.9 °F (37.2 °C)] 98.4 °F (36.9 °C)  Heart Rate:  [63-91] 80  Resp:  [16-18] 18  BP: ()/(56-81) 133/66    Intake/Output Summary (Last 24 hours) at 2022 1105  Last data filed at 2022 1000  Gross per 24 hour   Intake 180 ml   Output 1100 ml   Net -920 ml     Flowsheet Rows    Flowsheet Row First Filed Value   Admission Height 167 cm (65.75\") Documented at 2022 0734   Admission Weight 70.5 kg (155 lb 5 oz) Documented at 2022 0734          Physical Exam:   General Appearance:    Alert, cooperative, in no acute distress   Lungs:     Clear to auscultation.  Normal respiratory effort and rate.      Heart:   Irregularly irregular.  Rate is controlled.   Chest Wall:    No abnormalities observed   Abdomen:     Soft, nontender, positive bowel sounds.     Extremities:   no cyanosis, clubbing or edema.  No marked joint deformities.  Adequate musculoskeletal strength.       Results Review:    Results from last 7 days   Lab Units 22  0554   SODIUM mmol/L 135*   POTASSIUM mmol/L 4.5   CHLORIDE mmol/L 101   CO2 mmol/L 24.0   BUN mg/dL 14   CREATININE mg/dL 0.55*   GLUCOSE mg/dL 93   CALCIUM mg/dL 8.7         Results from last 7 days   Lab Units 22  0554   WBC 10*3/mm3 6.00   HEMOGLOBIN g/dL 11.3*   HEMATOCRIT % 34.6   PLATELETS 10*3/mm3 415             Results from last 7 days   Lab Units 22  0418   MAGNESIUM mg/dL 2.1             Medication Review:   cefTRIAXone, 2 g, " Intravenous, Q24H  clobetasol, 1 application, Topical, Q12H  digoxin, 250 mcg, Oral, Daily  enoxaparin, 1 mg/kg, Subcutaneous, Q12H  exemestane, 25 mg, Oral, Daily  famotidine, 20 mg, Oral, BID AC  hydrOXYzine pamoate, 25 mg, Oral, TID  ibuprofen, 600 mg, Oral, Daily  lidocaine, 1 patch, Transdermal, Q24H  metoprolol tartrate, 50 mg, Oral, Q12H  sodium chloride, 10 mL, Intravenous, Q12H              Assessment & Plan     1.  New onset of atrial fibrillation.  Her rate is controlled.  I am going to decrease digoxin to 150 mcg a day.  She is on Lovenox.  This has been held for possible hand surgery later today.  Start Eliquis 5 mg twice daily after her surgical issues have been resolved.  2.  Diastolic heart failure with preserved left ventricular ejection fraction.  She looks euvolemic on exam today.  3.  PFO  4.  Anemia  5.  Hypoalbuminemia  6.  Hyponatremia.    Agree with treating her infection and starting anticoagulation when she can tolerated and is past her surgical issues.  No indication for a transesophageal echocardiogram at this time.    Caitlyn Johns MD, [facility]  07/05/22  11:05 EDT

## 2022-07-05 NOTE — PROGRESS NOTES
ID note for cellulitis?  S:  Pain improved with ibuprofen  Tolerating abx  No f/c/ns    O: Temp:  [98.1 °F (36.7 °C)-98.9 °F (37.2 °C)] 98.4 °F (36.9 °C)  Heart Rate:  [63-91] 80  Resp:  [16-18] 18  BP: ()/(56-81) 133/66  GENERAL: Sickly, nontoxic  HEENT: Oropharynx is clear. Hearing is grossly normal.    left shoulder improved ROM, l second mcp joint swollen      White count 6.38, hemoglobin 11.9, platelets 359  Creatinine 0.61  6/28 Blood cultures no growth to date  6/28 left ankle arthrocentesis culture Group C strep (red blood cells 200,000, white blood cells 57,400, negative crystals)  6/30 left shoulder aspiration cultures no growth    A/p  1.  Septic arthritis L ankle d/t Group C strep, s/p I and D on 6/30  2.  Left shoulder synovitis  3.  Polyarticular arthritis flare with positive RF; AARON, ccp negative,   4.  AFib, heart rate improved  5.  Lumbar discitis  6.  Aortic valve calcification, no clear vegetation and in discussions with cardiology holding on KENNETH as will receive medical therapy for endocarditis by treatment of the discitis    Orthopedic and spine surgery notes reviewed.  No acute interventions planned.  MRI positive for second mcp joint septic arthritis. Await ortho opinion to see if this needs I and D.  Plan six weeks of Rocephin 2 g IV every 24 hours, stop date August 10.

## 2022-07-05 NOTE — PROGRESS NOTES
[facility]    [phone]   LOS: 6 days     Name: Marylu Georges  Age/Sex: 62 y.o. female  :  1960        PCP: Elie Dixon MD  Chief Complaint   Patient presents with   • Arm Swelling      Subjective   Her pain is better controlled today but this still seems to be her most pressing complaint.  Overall she is feeling a lot better denies new issues or complaints this morning.  Tolerating IV antibiotics denies fevers chills nausea vomiting or diarrhea presently.  General: No Fever or Chills, Cardiac: No Chest Pain or Palpitations, Resp: No Cough or SOA, GI: No Nausea, Vomiting, or Diarrhea and Other: No bleeding    cefTRIAXone, 2 g, Intravenous, Q24H  clobetasol, 1 application, Topical, Q12H  digoxin, 250 mcg, Oral, Daily  enoxaparin, 1 mg/kg, Subcutaneous, Q12H  exemestane, 25 mg, Oral, Daily  famotidine, 20 mg, Oral, BID AC  hydrOXYzine pamoate, 25 mg, Oral, TID  ibuprofen, 600 mg, Oral, Daily  lidocaine, 1 patch, Transdermal, Q24H  metoprolol tartrate, 50 mg, Oral, Q12H  sodium chloride, 10 mL, Intravenous, Q12H           Objective   Vital Signs  Temp:  [98.1 °F (36.7 °C)-98.9 °F (37.2 °C)] 98.4 °F (36.9 °C)  Heart Rate:  [63-91] 80  Resp:  [16-18] 18  BP: ()/(56-81) 133/66  Body mass index is 25.02 kg/m².    Intake/Output Summary (Last 24 hours) at 2022 0817  Last data filed at 2022 0801  Gross per 24 hour   Intake 300 ml   Output 1300 ml   Net -1000 ml       Physical Exam  Vitals reviewed.   Constitutional:       Appearance: Normal appearance.   Cardiovascular:      Rate and Rhythm: Normal rate and regular rhythm.   Pulmonary:      Effort: No respiratory distress.      Breath sounds: Normal breath sounds.   Abdominal:      General: Bowel sounds are normal. There is no distension.      Palpations: Abdomen is soft.   Neurological:      Mental Status: She is alert.           Results Review:       I reviewed the patient's new clinical results.  Results from last 7 days    Lab Units 07/05/22  0554 07/03/22  0611 07/02/22 0527 07/01/22 0419 06/30/22  0414   WBC 10*3/mm3 6.00 6.38 9.15 9.53 10.90*   HEMOGLOBIN g/dL 11.3* 11.9* 11.9* 11.5* 12.8   PLATELETS 10*3/mm3 415 359 357 299 266     Results from last 7 days   Lab Units 07/05/22  0554 07/04/22  0418 07/03/22  0611 07/02/22 0527 07/01/22 0419 06/30/22  0414 06/29/22  0530   SODIUM mmol/L 135*  --  133* 136 134* 137 135*   POTASSIUM mmol/L 4.5 5.0 4.4 4.1 4.3 4.1 4.3   CHLORIDE mmol/L 101  --  97* 97* 98 101 101   CO2 mmol/L 24.0  --  25.0 28.0 25.7 25.0 23.6   BUN mg/dL 14  --  15 16 17 20 17   CREATININE mg/dL 0.55*  --  0.61 0.72 0.64 0.58 0.68   CALCIUM mg/dL 8.7  --  8.6 8.9 8.7 8.9 9.2   MAGNESIUM mg/dL  --  2.1  --   --   --   --   --    PHOSPHORUS mg/dL 3.9  --   --   --   --   --   --    Estimated Creatinine Clearance: 114.7 mL/min (A) (by C-G formula based on SCr of 0.55 mg/dL (L)).      Assessment & Plan   Active Hospital Problems    Diagnosis  POA   • **Atrial fibrillation (HCC) [I48.91]  Yes   • New onset atrial fibrillation (HCC) [I48.91]  Yes   • Inflammatory arthritis [M19.90]  Yes   • Transaminitis [R74.01]  Yes   • Lymphedema of upper extremity, bilateral [I89.0]  Yes   • Rash [R21]  Yes   • Streptococcal arthritis of left ankle (HCC) [M00.272]  Unknown   • Malignant neoplasm of overlapping sites of left breast in female, estrogen receptor positive (HCC) [C50.812, Z17.0]  Not Applicable      Resolved Hospital Problems   No resolved problems to display.       PLAN  62 y.o. female admitted with Atrial fibrillation (HCC).     · New onset afib with rvr-tsh was normal. Echocardiogram with patent foramen ovale and severely dilated left atrial cavity. Continue digoxin, and metoprolol. On therapeutic lovenox for AC. Will need to transition to an oral AC once it's clear that no further operative interventions or procedures are necessary.   · Left ankle septic arthritis with group c strep-s/p I&D on 6/30/22 by   Marc. On ceftriaxone per ID thru 8/6  · Discitis, myositis of the lumbar spine-spine surgery has evaluated. No surgical intervention is recommend at this time. is consulted. On ceftriaxone as above.   · Left shoulder synovitis-went for joint aspiration on 6/30/22, but only scant material was able to be obtained. Culture is negative at 2 days.  · Left hand second MCP joint septic arthritis-MRI finally completed yesterday.  Hand surgery to evaluate today.  They have seen the patient awaiting the recommendations at this time.  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 cellulitis of the bilateral upper extremities and chest-greatly improved with topical steroids, antihistamines, and abx.  · Pain related to the above-continue norco, muscle relaxant, lidocaine patches, bowel regimen.   Unfortunately really did not get much extra relief with the ibuprofen given last night.  Difficult to say whether we should really consider long-acting pain management at this point.  I think we should discontinue her current regimen for now  · Elevated liver enzymes-acute hepatitis panel was negative. Liver u/s showed some mild dilation of the pancreatic duct. Lipase was slightly elevated. Her LFTs are trending down and she's not having any abdominal pain/nausea/vomiting, so will hold off on MRCP for the time being unless LFTs worsen again or she develops abdominal symptoms.  · History of left sided breast cancer s/p bilateral mastectomy in 2019, chemo/xrt-aromasin  · History of pericardial mass which resolved with chemotherapy-not seen on TTE this admission  · History of CVA  · Essential hypertension-adequate control acutely  · therapeutic lovenox for DVT prophylaxis.  · pepcid for gi ppx   · Full code.  · Discussed with patient and nursing staff.  · Anticipate discharge home with HH vs SNU facility timing yet to be determined. pt indicates to me  that she's not confident in her ability to safely return home and she's not sure whether her  will be able to adequately take care of her and she would prefer to go to rehab.           Luis M Shaw MD  [facility]  07/05/22  08:17 EDT

## 2022-07-05 NOTE — PLAN OF CARE
Goal Outcome Evaluation:  Plan of Care Reviewed With: patient        Progress: no change  Outcome Evaluation: Pt in bed at beg of PT session. Pt sat up to EOB w/ extra time and min A. Pt stood req CGA and use of fww. Pt c/o pain in B feet when standing w/ limited wt bearing on L foot. Pt amb 8' req CGA and use of fww. Mild unsteadiness noted w/ heavy use of B UE for support. Pt returned to bed req SV. Discussed w/ pt that to go to acute rehab, she will need to be able to participate in 3 hrs of therapy. Pt believes that her pain needs to be under better control. Discussed w/ RN who stated pt had taken all her available meds prior to therapy session. PT will follow up tomorrow for PT / OT co-treat.    Patient was intermittently wearing a face mask during this therapy encounter. Therapist used appropriate personal protective equipment including mask and gloves.  Mask used was standard procedure mask. Appropriate PPE was worn during the entire therapy session. Hand hygiene was completed before and after therapy session. Patient is not in enhanced droplet precautions.

## 2022-07-06 LAB
ALBUMIN SERPL-MCNC: 3.1 G/DL (ref 3.5–5.2)
ALBUMIN/GLOB SERPL: 0.9 G/DL
ALP SERPL-CCNC: 105 U/L (ref 39–117)
ALT SERPL W P-5'-P-CCNC: 44 U/L (ref 1–33)
ANION GAP SERPL CALCULATED.3IONS-SCNC: 7.5 MMOL/L (ref 5–15)
AST SERPL-CCNC: 37 U/L (ref 1–32)
BASOPHILS # BLD AUTO: 0.04 10*3/MM3 (ref 0–0.2)
BASOPHILS NFR BLD AUTO: 0.7 % (ref 0–1.5)
BILIRUB SERPL-MCNC: 0.5 MG/DL (ref 0–1.2)
BUN SERPL-MCNC: 16 MG/DL (ref 8–23)
BUN/CREAT SERPL: 26.2 (ref 7–25)
CALCIUM SPEC-SCNC: 8.9 MG/DL (ref 8.6–10.5)
CHLORIDE SERPL-SCNC: 101 MMOL/L (ref 98–107)
CO2 SERPL-SCNC: 25.5 MMOL/L (ref 22–29)
CREAT SERPL-MCNC: 0.61 MG/DL (ref 0.57–1)
DEPRECATED RDW RBC AUTO: 43.6 FL (ref 37–54)
EGFRCR SERPLBLD CKD-EPI 2021: 100 ML/MIN/1.73
EOSINOPHIL # BLD AUTO: 0.06 10*3/MM3 (ref 0–0.4)
EOSINOPHIL NFR BLD AUTO: 1.1 % (ref 0.3–6.2)
ERYTHROCYTE [DISTWIDTH] IN BLOOD BY AUTOMATED COUNT: 13.2 % (ref 12.3–15.4)
GLOBULIN UR ELPH-MCNC: 3.3 GM/DL
GLUCOSE SERPL-MCNC: 93 MG/DL (ref 65–99)
HCT VFR BLD AUTO: 35.4 % (ref 34–46.6)
HGB BLD-MCNC: 11.6 G/DL (ref 12–15.9)
IMM GRANULOCYTES # BLD AUTO: 0.02 10*3/MM3 (ref 0–0.05)
IMM GRANULOCYTES NFR BLD AUTO: 0.4 % (ref 0–0.5)
LYMPHOCYTES # BLD AUTO: 1.27 10*3/MM3 (ref 0.7–3.1)
LYMPHOCYTES NFR BLD AUTO: 23.3 % (ref 19.6–45.3)
MCH RBC QN AUTO: 30.1 PG (ref 26.6–33)
MCHC RBC AUTO-ENTMCNC: 32.8 G/DL (ref 31.5–35.7)
MCV RBC AUTO: 91.7 FL (ref 79–97)
MONOCYTES # BLD AUTO: 0.39 10*3/MM3 (ref 0.1–0.9)
MONOCYTES NFR BLD AUTO: 7.2 % (ref 5–12)
NEUTROPHILS NFR BLD AUTO: 3.67 10*3/MM3 (ref 1.7–7)
NEUTROPHILS NFR BLD AUTO: 67.3 % (ref 42.7–76)
NRBC BLD AUTO-RTO: 0 /100 WBC (ref 0–0.2)
PLATELET # BLD AUTO: 468 10*3/MM3 (ref 140–450)
PMV BLD AUTO: 8.8 FL (ref 6–12)
POTASSIUM SERPL-SCNC: 4.2 MMOL/L (ref 3.5–5.2)
PROT SERPL-MCNC: 6.4 G/DL (ref 6–8.5)
RBC # BLD AUTO: 3.86 10*6/MM3 (ref 3.77–5.28)
SODIUM SERPL-SCNC: 134 MMOL/L (ref 136–145)
WBC NRBC COR # BLD: 5.45 10*3/MM3 (ref 3.4–10.8)

## 2022-07-06 PROCEDURE — 80053 COMPREHEN METABOLIC PANEL: CPT | Performed by: HOSPITALIST

## 2022-07-06 PROCEDURE — 25010000002 ENOXAPARIN PER 10 MG: Performed by: NURSE PRACTITIONER

## 2022-07-06 PROCEDURE — 25010000002 CEFTRIAXONE PER 250 MG: Performed by: HOSPITALIST

## 2022-07-06 PROCEDURE — 97530 THERAPEUTIC ACTIVITIES: CPT

## 2022-07-06 PROCEDURE — 85025 COMPLETE CBC W/AUTO DIFF WBC: CPT | Performed by: HOSPITALIST

## 2022-07-06 PROCEDURE — 97535 SELF CARE MNGMENT TRAINING: CPT

## 2022-07-06 PROCEDURE — 99233 SBSQ HOSP IP/OBS HIGH 50: CPT | Performed by: INTERNAL MEDICINE

## 2022-07-06 PROCEDURE — 97110 THERAPEUTIC EXERCISES: CPT

## 2022-07-06 RX ADMIN — IBUPROFEN 600 MG: 600 TABLET, FILM COATED ORAL at 17:28

## 2022-07-06 RX ADMIN — Medication 10 ML: at 08:43

## 2022-07-06 RX ADMIN — HYDROXYZINE PAMOATE 25 MG: 25 CAPSULE ORAL at 20:12

## 2022-07-06 RX ADMIN — HYDROXYZINE PAMOATE 25 MG: 25 CAPSULE ORAL at 17:28

## 2022-07-06 RX ADMIN — HYDROCODONE BITARTRATE AND ACETAMINOPHEN 1 TABLET: 7.5; 325 TABLET ORAL at 06:30

## 2022-07-06 RX ADMIN — HYDROCODONE BITARTRATE AND ACETAMINOPHEN 1 TABLET: 7.5; 325 TABLET ORAL at 10:23

## 2022-07-06 RX ADMIN — EXEMESTANE 25 MG: 25 TABLET, FILM COATED ORAL at 08:42

## 2022-07-06 RX ADMIN — TIZANIDINE 4 MG: 4 TABLET ORAL at 17:28

## 2022-07-06 RX ADMIN — Medication 10 ML: at 20:14

## 2022-07-06 RX ADMIN — CLOBETASOL PROPIONATE 1 APPLICATION: 0.5 CREAM TOPICAL at 20:13

## 2022-07-06 RX ADMIN — DIGOXIN 125 MCG: 125 TABLET ORAL at 13:25

## 2022-07-06 RX ADMIN — APIXABAN 5 MG: 5 TABLET, FILM COATED ORAL at 20:12

## 2022-07-06 RX ADMIN — HYDROXYZINE PAMOATE 25 MG: 25 CAPSULE ORAL at 08:42

## 2022-07-06 RX ADMIN — HYDROCODONE BITARTRATE AND ACETAMINOPHEN 1 TABLET: 7.5; 325 TABLET ORAL at 18:41

## 2022-07-06 RX ADMIN — CEFTRIAXONE SODIUM 2 G: 2 INJECTION, POWDER, FOR SOLUTION INTRAMUSCULAR; INTRAVENOUS at 13:25

## 2022-07-06 RX ADMIN — METOPROLOL TARTRATE 50 MG: 25 TABLET, FILM COATED ORAL at 20:12

## 2022-07-06 RX ADMIN — CLOBETASOL PROPIONATE 1 APPLICATION: 0.5 CREAM TOPICAL at 08:42

## 2022-07-06 RX ADMIN — HYDROCODONE BITARTRATE AND ACETAMINOPHEN 1 TABLET: 7.5; 325 TABLET ORAL at 02:51

## 2022-07-06 RX ADMIN — FAMOTIDINE 20 MG: 20 TABLET ORAL at 17:28

## 2022-07-06 RX ADMIN — METOPROLOL TARTRATE 50 MG: 25 TABLET, FILM COATED ORAL at 08:42

## 2022-07-06 RX ADMIN — LIDOCAINE 1 PATCH: 50 PATCH TOPICAL at 20:12

## 2022-07-06 RX ADMIN — FAMOTIDINE 20 MG: 20 TABLET ORAL at 06:30

## 2022-07-06 RX ADMIN — TIZANIDINE 4 MG: 4 TABLET ORAL at 08:42

## 2022-07-06 RX ADMIN — HYDROCODONE BITARTRATE AND ACETAMINOPHEN 1 TABLET: 7.5; 325 TABLET ORAL at 14:36

## 2022-07-06 RX ADMIN — ENOXAPARIN SODIUM 70 MG: 100 INJECTION SUBCUTANEOUS at 10:23

## 2022-07-06 NOTE — CONSULTS
.                                                                                                                                                     Patient Care Team:  Elie Dixon MD as PCP - General (Hematology and Oncology)  Joby Barron Jr., MD as Referring Physician (General Surgery)  Elie Dixon MD as Consulting Physician (Hematology and Oncology)  Pedro Marmolejo MD as Consulting Physician (Radiation Oncology)    Chief complaint   Bilateral upper extremities and left ankle pain, swelling.    History of present illness:   The patient is a 62-year-old lady who has been admitted to [facility] 1 week ago.  According to patient, she noticed increase of pain and swelling over both upper extremity and the left ankle about 10 days ago.  Eventually, she underwent surgery by Dr. Tran and Left ankle incision and drainage was performed.  Both upper extremity pain and the swelling was treated nonsurgically.      According to patient, she is making reasonable progress with decrease of pain and increase of range of motion.  However, it still somewhat bothers her.  Symptoms are worse on left index finger MCP joint.  She denies any subjective fever or chills.    Review of Systems  CONSTITUTIONAL: As per HPI.   HEENT: Eyes: No diplopia or blurred vision. ENT: No earache, sore throat or runny nose.   CARDIOVASCULAR: No pressure, squeezing, strangling, tightness, heaviness or aching about the chest, neck, axilla or epigastrium.   RESPIRATORY: No cough, shortness of breath, PND or orthopnea.   GASTROINTESTINAL: No nausea, vomiting or diarrhea.   GENITOURINARY: No dysuria, frequency or urgency.   MUSCULOSKELETAL: As per HPI.   SKIN: No change in skin, hair or nails.   NEUROLOGIC: No paresthesias, fasciculations, seizures or weakness.   PSYCHIATRIC: No disorder of thought or mood.   ENDOCRINE: No heat or cold intolerance, polyuria or polydipsia.   HEMATOLOGICAL: No easy bruising or bleeding.       Past  Medical History:   Diagnosis Date   • Anemia in neoplastic disease    • Arthritis    • Breast cancer (HCC)     Left   • CVA (cerebral vascular accident) (HCC)    • Hip pain     RIGHT HIP... CYST   • History of fracture of leg 1987   • History of radiation therapy     LAST TREATMENT     • Hypertension    • Limited joint range of motion (ROM)     RIGHT HIP   • Skin sore     OPEN SORE LEFT BREAST   • Syncope    • Vertigo      Past Surgical History:   Procedure Laterality Date   • AXILLARY LYMPH NODE BIOPSY/EXCISION Right     LYMPH NODE UNDER RIGHT ARM-MALIGNANT (DOUBLE MASTECTOMY)   • BREAST BIOPSY Left     MALIGNANT   • FRACTURE SURGERY  1987    Leg   • HARDWARE REMOVAL     • INCISION AND DRAINAGE LEG Left 6/30/2022    Procedure: INCISION AND DRAINAGE left ankle;  Surgeon: Kenneth Tran MD;  Location: [facility];  Service: Orthopedics;  Laterality: Left;   • MASTECTOMY W/ SENTINEL NODE BIOPSY Bilateral 9/16/2019    Procedure: BILATERLA MODIFIED RADICAL MASTECTOMY WITH BILATERAL SENTINEL LYMPH NODE BIOPSY;  Surgeon: Joby Barron Jr., MD;  Location: [facility] OR;  Service: General   • TOTAL HIP ARTHROPLASTY Right 3/2/2020    Procedure: RIGHT TOTAL HIP ARTHROPLASTY NATALY NAVIGATION;  Surgeon: Luis M Leonard MD;  Location: [facility] OR;  Service: Orthopedics;  Laterality: Right;   • TOTAL HIP ARTHROPLASTY Left 7/20/2021    Procedure: Posterior LEFT TOTAL HIP ARTHROPLASTY NATALY NAVIGATION;  Surgeon: Luis M Leonard MD;  Location: [facility] OR;  Service: Orthopedics;  Laterality: Left;   • VENOUS ACCESS DEVICE (PORT) INSERTION Right 2/1/2019    Procedure: INSERTION VENOUS ACCESS DEVICE;  Surgeon: Joby Barron Jr., MD;  Location: [facility];  Service: General   • VENOUS ACCESS DEVICE (PORT) REMOVAL N/A 10/30/2019    Procedure: Mediport Removal;  Surgeon: Joby Barron Jr., MD;  Location: [facility] OR;  Service: General     Family History   Problem Relation Age of Onset   • Lung cancer  "Father    • Irritable bowel syndrome Father    • Cancer Mother    • Breast cancer Mother    • Liver disease Mother    • Breast cancer Sister 48   • Breast cancer Maternal Grandmother    • Breast cancer Paternal Grandmother    • Breast cancer Maternal Uncle    • Malig Hyperthermia Neg Hx      Social History     Tobacco Use   • Smoking status: Never Smoker   • Smokeless tobacco: Never Used   Vaping Use   • Vaping Use: Never used   Substance Use Topics   • Alcohol use: Not Currently     Alcohol/week: 0.0 standard drinks     Comment: 2 CUPS DAILY   • Drug use: No     Medications Prior to Admission   Medication Sig Dispense Refill Last Dose   • acetaminophen (TYLENOL) 650 MG 8 hr tablet Take 1,950 mg by mouth Every 8 (Eight) Hours As Needed.      • Cholecalciferol 250 MCG (53463 UT) tablet Take 1 tablet by mouth. Patient stated dose as \"1500 mg\"      • exemestane (AROMASIN) 25 MG chemo tablet Take 1 tablet by mouth Daily. 30 tablet 3    • metoprolol succinate XL (TOPROL-XL) 25 MG 24 hr tablet Take 1 tablet by mouth Daily. 90 tablet 3    • gabapentin (NEURONTIN) 100 MG capsule TAKE ONE CAPSULE BY MOUTH ONCE NIGHTLY FOR INSOMNIA 90 capsule 0    • magnesium oxide (MAG-OX) 400 MG tablet Take 400 mg by mouth 2 (two) times a day. Patient stated dose as \"1500mg\"      • meclizine (ANTIVERT) 25 MG tablet Take 1 tablet by mouth 3 (Three) Times a Day As Needed for Dizziness. 90 tablet 0    • naproxen (NAPROSYN) 500 MG tablet Take 1 tablet by mouth 2 (Two) Times a Day As Needed for Mild Pain . 15 tablet 0      cefTRIAXone, 2 g, Intravenous, Q24H  clobetasol, 1 application, Topical, Q12H  digoxin, 125 mcg, Oral, Daily  enoxaparin, 1 mg/kg, Subcutaneous, Q12H  exemestane, 25 mg, Oral, Daily  famotidine, 20 mg, Oral, BID AC  hydrOXYzine pamoate, 25 mg, Oral, TID  ibuprofen, 600 mg, Oral, Daily  lidocaine, 1 patch, Transdermal, Q24H  metoprolol tartrate, 50 mg, Oral, Q12H  sodium chloride, 10 mL, Intravenous, Q12H      Allergies: "   Benadryl [diphenhydramine], Erythromycin, Levaquin [levofloxacin], and Penicillins      Vital Signs   Temp:  [98.1 °F (36.7 °C)-99.1 °F (37.3 °C)] 99.1 °F (37.3 °C)  Heart Rate:  [63-84] 84  Resp:  [16-18] 16  BP: ()/(66-94) 126/90      Physical Exam:     General Appearance:    Alert, cooperative, in no acute distress   Head:    Normocephalic, without obvious abnormality, atraumatic   Eyes:            Lids and lashes normal, conjunctivae and sclerae normal, no   icterus, no pallor, corneas clear, PERRLA   Ears:    Ears appear intact with no abnormalities noted   Throat:   No oral lesions, no thrush, oral mucosa moist   Neck:   No adenopathy, supple, trachea midline, no thyromegaly, no   carotid bruit, no JVD   Back:     No kyphosis present, no scoliosis present, no skin lesions,      erythema or scars, no tenderness to percussion or                   palpation,   range of motion normal   Lungs:     Clear to auscultation,respirations regular, even and                  unlabored    Heart:    Regular rhythm and normal rate, normal S1 and S2, no            murmur, no gallop, no rub, no click   Abdomen:     Normal bowel sounds, no masses, no organomegaly, soft        non-tender, non-distended, no guarding, no rebound                tenderness   Rectal:     Deferred   Extremities:   Moves all extremities well, no edema, no cyanosis, no             redness    LUE: viable with good blanching and capillary refills; mild diffuse swelling over elbow  L index finger: moderate diffuse swelling without any erythema; no open wound or fluid fluctuation; mild local tenderness; moderate limited range of motion; no increase of pain upon range of motion; N/V is grossly intact.       Results Review:   I reviewed the patient's new clinical results.      Latest Reference Range & Units 07/02/22 05:27 07/03/22 06:11 07/05/22 05:54   WBC 3.40 - 10.80 10*3/mm3 9.15 6.38 6.00   RBC 3.77 - 5.28 10*6/mm3 3.92 3.87 3.72 (L)   Hemoglobin  12.0 - 15.9 g/dL 11.9 (L) 11.9 (L) 11.3 (L)   Hematocrit 34.0 - 46.6 % 35.6 35.8 34.6   (L): Data is abnormally low  7/4/2022: MRI of L hand  1. Suspected septic arthritis at the 2nd MCP joint where there is joint  space narrowing with joint effusion and surrounding synovitis and soft  tissue inflammation. Mild marrow edema within the radial aspects of the  2nd metacarpal head and base of the 2nd proximal phalanx suspicious for  early osteomyelitis though no cortical loss is demonstrated.   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 There are also advanced arthritic  changes at the 1st CMC joint.    Atrial fibrillation (HCC)    Malignant neoplasm of overlapping sites of left breast in female, estrogen receptor positive (HCC)    Transaminitis    Lymphedema of upper extremity, bilateral    Rash    Inflammatory arthritis    Streptococcal arthritis of left ankle (HCC)    New onset atrial fibrillation (HCC)      Impression:  Left hand pain and swelling especially index finger MCP joint.    Plans:  1.  The cause of the problem is still most likely septic arthritis although I cannot completely rule out inflammatory joint disease.  The onset of symptoms was about 10 days and the patient is making reasonable progress with decrease of pain, increase of range of motion.  Although surgical intervention with joint exploration can be considered, considering benefits versus risk and the fact patient is making progress, I do not feel surgical intervention is necessary at this moment.  2.  I recommend continue antibiotics and treated as septic arthritis.  I encourage patient to go home range of motion.  Patient agreed with the plan.    3.  We will continue to follow.    Mode Voss MD  07/05/22  22:41 EDT  Office phone: [phone]

## 2022-07-06 NOTE — PLAN OF CARE
Goal Outcome Evaluation:  Plan of Care Reviewed With: patient        Progress: improving  Outcome Evaluation: Pt was seen today for PT/OT co-treat to encourage participation and due to high pain levels in previous sessions. Pt lori's significant improvement in her mobility today. Able to ambulate to the bathroom and perform toilet transfer and brush her teeth with OT, and then ambulates over to the chair to perform seated LE ther ex. Limited WB'ing through LLE d/t high pain levels. Dec'd AROM in L ankle, performed gastroc stretch with towel to work on ankle mobility. Pt does complain of pain throughout session, but able to complete all exercises asked of her. Based on today's session, pt would be appropriate for D/C to IRF.

## 2022-07-06 NOTE — PLAN OF CARE
Goal Outcome Evaluation:  Plan of Care Reviewed With: patient        Progress: improving  Outcome Evaluation: Laxatives given per order, still multiple smear outputs. No pain per pt but pressure. Skin care done, weight shifted/turned, pt also able to turn self. VSS, will continue to monitor.

## 2022-07-06 NOTE — THERAPY TREATMENT NOTE
Patient Name: Marylu Georges  : 1960    MRN: [id number]                              Today's Date: 2022       Admit Date: 2022    Visit Dx:     ICD-10-CM ICD-9-CM   1. Streptococcal arthritis of left ankle (HCC)  M00.272 711.07     041.00   2. Atrial fibrillation, unspecified type (HCC)  I48.91 427.31   3. Elevated LFTs  R79.89 790.6   4. Elevated C-reactive protein (CRP)  R79.82 790.95   5. Elevated sed rate  R70.0 790.1   6. Edema of both upper extremities  R60.0 782.3     Patient Active Problem List   Diagnosis   • Malignant neoplasm of overlapping sites of left breast in female, estrogen receptor positive (HCC)   • Family history of breast cancer in female   • Syncope   • Malignant neoplasm of overlapping sites of both breasts in female, estrogen receptor positive (HCC)   • Hypertension   • Encounter for long-term (current) use of other medications   • Drug-induced hepatitis   • Atrial fibrillation (HCC)   • Transaminitis   • Lymphedema of upper extremity, bilateral   • Rash   • Inflammatory arthritis   • Streptococcal arthritis of left ankle (HCC)   • New onset atrial fibrillation (HCC)     Past Medical History:   Diagnosis Date   • Anemia in neoplastic disease    • Arthritis    • Breast cancer (HCC)     Left   • CVA (cerebral vascular accident) (HCC)    • Hip pain     RIGHT HIP... CYST   • History of fracture of leg    • History of radiation therapy     LAST TREATMENT     • Hypertension    • Limited joint range of motion (ROM)     RIGHT HIP   • Skin sore     OPEN SORE LEFT BREAST   • Syncope    • Vertigo      Past Surgical History:   Procedure Laterality Date   • AXILLARY LYMPH NODE BIOPSY/EXCISION Right     LYMPH NODE UNDER RIGHT ARM-MALIGNANT (DOUBLE MASTECTOMY)   • BREAST BIOPSY Left     MALIGNANT   • FRACTURE SURGERY      Leg   • HARDWARE REMOVAL     • INCISION AND DRAINAGE LEG Left 2022    Procedure: INCISION AND DRAINAGE left ankle;  Surgeon: Kenneth Tran MD;   Location: [facility] OR;  Service: Orthopedics;  Laterality: Left;   • MASTECTOMY W/ SENTINEL NODE BIOPSY Bilateral 9/16/2019    Procedure: BILATERLA MODIFIED RADICAL MASTECTOMY WITH BILATERAL SENTINEL LYMPH NODE BIOPSY;  Surgeon: Joby Barron Jr., MD;  Location: [facility];  Service: General   • TOTAL HIP ARTHROPLASTY Right 3/2/2020    Procedure: RIGHT TOTAL HIP ARTHROPLASTY NATALY NAVIGATION;  Surgeon: Luis M Leonard MD;  Location: [facility];  Service: Orthopedics;  Laterality: Right;   • TOTAL HIP ARTHROPLASTY Left 7/20/2021    Procedure: Posterior LEFT TOTAL HIP ARTHROPLASTY NATALY NAVIGATION;  Surgeon: Luis M Leonard MD;  Location: [facility] OR;  Service: Orthopedics;  Laterality: Left;   • VENOUS ACCESS DEVICE (PORT) INSERTION Right 2/1/2019    Procedure: INSERTION VENOUS ACCESS DEVICE;  Surgeon: Joby Barron Jr., MD;  Location: [facility];  Service: General   • VENOUS ACCESS DEVICE (PORT) REMOVAL N/A 10/30/2019    Procedure: Mediport Removal;  Surgeon: Joby Barron Jr., MD;  Location: [facility] OR;  Service: General      General Information     Row Name 07/06/22 1239          OT Time and Intention    Document Type therapy note (daily note)  -RB     Mode of Treatment co-treatment;physical therapy;occupational therapy  -     Row Name 07/06/22 1239          General Information    Patient Profile Reviewed yes  -RB     Prior Level of Function independent:  -RB     Existing Precautions/Restrictions fall  -RB     Row Name 07/06/22 1239          Cognition    Orientation Status (Cognition) oriented x 3  -RB           User Key  (r) = Recorded By, (t) = Taken By, (c) = Cosigned By    Initials Name Provider Type    RB Viviane Mauricio OT Occupational Therapist                 Mobility/ADL's     Row Name 07/06/22 1239          Bed Mobility    Bed Mobility supine-sit  -RB     Supine-Sit Nicholas (Bed Mobility) modified independence  -RB     Assistive Device (Bed Mobility) bed rails   -RB     Row Name 07/06/22 1239          Transfers    Transfers sit-stand transfer;stand-sit transfer;toilet transfer;bed-chair transfer  -RB     Bed-Chair [place] (Transfers) verbal cues;contact guard  -RB     Assistive Device (Bed-Chair Transfers) walker, front-wheeled  -RB     Sit-Stand [place] (Transfers) verbal cues;contact guard  -RB     Stand-Sit [place] (Transfers) contact guard;verbal cues  -RB     [place]ois Level (Toilet Transfer) verbal cues;contact guard  -RB     Assistive Device (Toilet Transfer) walker, front-wheeled  -RB     Row Name 07/06/22 1239          Sit-Stand Transfer    Assistive Device (Sit-Stand Transfers) walker, front-wheeled  -RB     Row Name 07/06/22 1239          Stand-Sit Transfer    Assistive Device (Stand-Sit Transfers) walker, front-wheeled  -RB     Row Name 07/06/22 1239          Toilet Transfer    Type (Toilet Transfer) stand-sit;sit-stand  -RB     Row Name 07/06/22 1239          Functional Mobility    Functional Mobility- Ind. Level contact guard assist;verbal cues required  -RB     Functional Mobility- Device walker, front-wheeled  -RB     Functional Mobility- Comment cues to decrease impulsive movements and pacing  -RB     Row Name 07/06/22 1239          Activities of Daily Living    BADL Assessment/Intervention lower body dressing;grooming  -RB     Row Name 07/06/22 1239          Lower Body Dressing Assessment/Training    [place] Level (Lower Body Dressing) lower body dressing skills;maximum assist (25% patient effort)  -RB     Position (Lower Body Dressing) edge of bed sitting  -RB     Row Name 07/06/22 1239          Grooming Assessment/Training    [place] Level (Grooming) grooming skills;contact guard assist  -RB     Position (Grooming) sink side;supported standing  -RB     Comment, (Grooming) forward flexed posture 2/2 to fatigue and BLE pain.  -RB           User Key  (r) = Recorded By, (t) = Taken By, (c) = Cosigned By    Initials Name Provider  Type    RB Viviane Mauricio OT Occupational Therapist               Obj/Interventions     Row Name 07/06/22 1241          Shoulder (Therapeutic Exercise)    Shoulder (Therapeutic Exercise) AROM (active range of motion)  -RB     Shoulder AROM (Therapeutic Exercise) bilateral;flexion;extension;20 repititions;sitting  -RB     Row Name 07/06/22 1241          Elbow/Forearm (Therapeutic Exercise)    Elbow/Forearm (Therapeutic Exercise) AROM (active range of motion)  -RB     Elbow/Forearm AROM (Therapeutic Exercise) bilateral;flexion;extension;20 repititions;sitting  -RB     Row Name 07/06/22 1241          Motor Skills    Therapeutic Exercise shoulder;elbow/forearm  -RB     Row Name 07/06/22 1241          Balance    Comment, Balance CGA for standing mobility with the walker.  -RB           User Key  (r) = Recorded By, (t) = Taken By, (c) = Cosigned By    Initials Name Provider Type    RB Viviane Mauricio OT Occupational Therapist               Goals/Plan    No documentation.                Clinical Impression     Row Name 07/06/22 1241          Pain Assessment    Pretreatment Pain Rating 3/10  -RB     Posttreatment Pain Rating 6/10  -RB     Pain Location - Side/Orientation Left  -RB     Pain Location generalized  -RB     Pain Location - ankle;extremity  -RB     Pain Intervention(s) Repositioned  -RB     Row Name 07/06/22 1241          Plan of Care Review    Plan of Care Reviewed With patient  -RB     Progress improving  -RB     Row Name 07/06/22 1241          Therapy Assessment/Plan (OT)    Rehab Potential (OT) good, to achieve stated therapy goals  -RB     Criteria for Skilled Therapeutic Interventions Met (OT) yes;skilled treatment is necessary  -RB     Therapy Frequency (OT) 5 times/wk  -RB     Row Name 07/06/22 1241          Therapy Plan Review/Discharge Plan (OT)    Anticipated Discharge Disposition (OT) inpatient rehabilitation facility  -RB     Row Name 07/06/22 1241          Vital Signs    O2 Delivery Pre  Treatment room air  -RB     O2 Delivery Intra Treatment room air  -RB     O2 Delivery Post Treatment room air  -RB     Pre Patient Position Supine  -RB     Intra Patient Position Standing  -RB     Post Patient Position Sitting  -RB     Row Name 07/06/22 1241          Positioning and Restraints    Pre-Treatment Position in bed  -RB     Post Treatment Position chair  -RB     In Chair notified nsg;reclined;sitting;call light within reach;encouraged to call for assist;exit alarm on;legs elevated  -RB           User Key  (r) = Recorded By, (t) = Taken By, (c) = Cosigned By    Initials Name Provider Type    RB Viviane Mauricio OT Occupational Therapist               Outcome Measures     Row Name 07/06/22 1152          How much help from another person do you currently need...    Turning from your back to your side while in flat bed without using bedrails? 4  -DB     Moving from lying on back to sitting on the side of a flat bed without bedrails? 3  -DB     Moving to and from a bed to a chair (including a wheelchair)? 3  -DB     Standing up from a chair using your arms (e.g., wheelchair, bedside chair)? 3  -DB     Climbing 3-5 steps with a railing? 2  -DB     To walk in hospital room? 3  -DB     AM-PAC 6 Clicks Score (PT) 18  -DB     Highest level of mobility 6 --> Walked 10 steps or more  -DB     Row Name 07/06/22 1152          Functional Assessment    Outcome Measure Options AM-PAC 6 Clicks Basic Mobility (PT)  -DB           User Key  (r) = Recorded By, (t) = Taken By, (c) = Cosigned By    Initials Name Provider Type    Rhonda Adler PT Physical Therapist                Occupational Therapy Education                 Title: PT OT SLP Therapies (Done)     Topic: Occupational Therapy (Done)     Point: ADL training (Done)     Description:   Instruct learner(s) on proper safety adaptation and remediation techniques during self care or transfers.   Instruct in proper use of assistive devices.              Learning  Progress Summary           Patient Acceptance, E, VU,NR by VS at 6/28/2022 1446    Comment: OT discussed the POC and the goals with the pt.                   Point: Home exercise program (Done)     Description:   Instruct learner(s) on appropriate technique for monitoring, assisting and/or progressing therapeutic exercises/activities.              Learning Progress Summary           Patient Acceptance, E, VU,NR by VS at 6/28/2022 1446    Comment: OT discussed the POC and the goals with the pt.                   Point: Precautions (Done)     Description:   Instruct learner(s) on prescribed precautions during self-care and functional transfers.              Learning Progress Summary           Patient Acceptance, E, VU,NR by VS at 6/28/2022 1446    Comment: OT discussed the POC and the goals with the pt.                               User Key     Initials Effective Dates Name Provider Type Discipline    VS 06/16/21 -  Trista Shaikh OTR Occupational Therapist OT              OT Recommendation and Plan  Planned Therapy Interventions (OT): transfer/mobility retraining, strengthening exercise, ROM/therapeutic exercise, activity tolerance training, adaptive equipment training, BADL retraining, functional balance retraining, occupation/activity based interventions, patient/caregiver education/training, edema control/reduction  Therapy Frequency (OT): 5 times/wk  Plan of Care Review  Plan of Care Reviewed With: patient  Progress: improving     Time Calculation:    Time Calculation- OT     Row Name 07/06/22 1238             Time Calculation- OT    OT Start Time 1117  -RB      OT Stop Time 1144  -RB      OT Time Calculation (min) 27 min  -RB      Total Timed Code Minutes- OT 27 minute(s)  -RB      OT Received On 07/06/22  -RB      OT - Next Appointment 07/07/22  -RB              Timed Charges    34985 - OT Therapeutic Exercise Minutes 10  -RB      25483 - OT Self Care/Mgmt Minutes 17  -RB              Total Minutes    Timed  Charges Total Minutes 27  -RB       Total Minutes 27  -RB            User Key  (r) = Recorded By, (t) = Taken By, (c) = Cosigned By    Initials Name Provider Type    Viviane Shah OT Occupational Therapist              Therapy Charges for Today     Code Description Service Date Service Provider Modifiers Qty    [id number]  OT SELF CARE/MGMT/TRAIN EA 15 MIN 7/6/2022 Viviane Mauricio OT GO 1    [id number]  OT THER PROC EA 15 MIN 7/6/2022 Viviane Mauricio OT GO 1               Viviane Mauricio OT  7/6/2022

## 2022-07-06 NOTE — PROGRESS NOTES
Continued Stay Note  [facility]     Patient Name: Marylu Georges  MRN: [id number]  Today's Date: 7/6/2022    Admit Date: 6/27/2022     Discharge Plan     Row Name 07/06/22 1617       Plan    Plan Rehab placement pending    Plan Comments Followed up with Kaylie with [facility] since this is pt's first choice for rehab at [place].  Per Kaylie she is waiting to discuss pt with rehab MD to confirm if pt is appropraite for acute level of rehab. Pt will require insurance per cert for any level of rehab at [place].  CCP will follow up to confirm pt's DC plan               Discharge Codes    No documentation.               Expected Discharge Date and Time     Expected Discharge Date Expected Discharge Time    Jul 7, 2022             FLACO Brown

## 2022-07-06 NOTE — PLAN OF CARE
Goal Outcome Evaluation:  Plan of Care Reviewed With: patient        Progress: improving  Outcome Evaluation: pt doing better today-pain better managed. worked with PT and OT. awaiting acute rehab decision for placement. PICC ordered for discharge. VSS. continue to monitor

## 2022-07-06 NOTE — PLAN OF CARE
Goal Outcome Evaluation:  Plan of Care Reviewed With: patient        Progress: improving  Outcome Evaluation: Pain well controlled per pt. Slept for the most of the night without complaint. VSS, will continue to monitor.

## 2022-07-06 NOTE — PROGRESS NOTES
[facility]  Continuing to follow progress but pt is limited by pain and unable to bear weight on Lt LE.  She refused OT yesterday due to pain. Noted PT/OT plan to co treat today. Will continue to follow to see if she is able to tolerate/ participate in 3 hours of therapy a day     Kaylie Valverde RN  Acute Rehab Admission Nurse

## 2022-07-06 NOTE — THERAPY TREATMENT NOTE
Patient Name: Marylu Georges  : 1960    MRN: [id number]                              Today's Date: 2022       Admit Date: 2022    Visit Dx:     ICD-10-CM ICD-9-CM   1. Streptococcal arthritis of left ankle (HCC)  M00.272 711.07     041.00   2. Atrial fibrillation, unspecified type (HCC)  I48.91 427.31   3. Elevated LFTs  R79.89 790.6   4. Elevated C-reactive protein (CRP)  R79.82 790.95   5. Elevated sed rate  R70.0 790.1   6. Edema of both upper extremities  R60.0 782.3     Patient Active Problem List   Diagnosis   • Malignant neoplasm of overlapping sites of left breast in female, estrogen receptor positive (HCC)   • Family history of breast cancer in female   • Syncope   • Malignant neoplasm of overlapping sites of both breasts in female, estrogen receptor positive (HCC)   • Hypertension   • Encounter for long-term (current) use of other medications   • Drug-induced hepatitis   • Atrial fibrillation (HCC)   • Transaminitis   • Lymphedema of upper extremity, bilateral   • Rash   • Inflammatory arthritis   • Streptococcal arthritis of left ankle (HCC)   • New onset atrial fibrillation (HCC)     Past Medical History:   Diagnosis Date   • Anemia in neoplastic disease    • Arthritis    • Breast cancer (HCC)     Left   • CVA (cerebral vascular accident) (HCC)    • Hip pain     RIGHT HIP... CYST   • History of fracture of leg    • History of radiation therapy     LAST TREATMENT     • Hypertension    • Limited joint range of motion (ROM)     RIGHT HIP   • Skin sore     OPEN SORE LEFT BREAST   • Syncope    • Vertigo      Past Surgical History:   Procedure Laterality Date   • AXILLARY LYMPH NODE BIOPSY/EXCISION Right     LYMPH NODE UNDER RIGHT ARM-MALIGNANT (DOUBLE MASTECTOMY)   • BREAST BIOPSY Left     MALIGNANT   • FRACTURE SURGERY      Leg   • HARDWARE REMOVAL     • INCISION AND DRAINAGE LEG Left 2022    Procedure: INCISION AND DRAINAGE left ankle;  Surgeon: Kenneth Tran MD;   Location: [facility] MAIN OR;  Service: Orthopedics;  Laterality: Left;   • MASTECTOMY W/ SENTINEL NODE BIOPSY Bilateral 9/16/2019    Procedure: BILATERLA MODIFIED RADICAL MASTECTOMY WITH BILATERAL SENTINEL LYMPH NODE BIOPSY;  Surgeon: Joby Barron Jr., MD;  Location: [facility] MAIN OR;  Service: General   • TOTAL HIP ARTHROPLASTY Right 3/2/2020    Procedure: RIGHT TOTAL HIP ARTHROPLASTY NATALY NAVIGATION;  Surgeon: Luis M Leonard MD;  Location: [facility] MAIN OR;  Service: Orthopedics;  Laterality: Right;   • TOTAL HIP ARTHROPLASTY Left 7/20/2021    Procedure: Posterior LEFT TOTAL HIP ARTHROPLASTY NATALY NAVIGATION;  Surgeon: Luis M Leonard MD;  Location: [facility] MAIN OR;  Service: Orthopedics;  Laterality: Left;   • VENOUS ACCESS DEVICE (PORT) INSERTION Right 2/1/2019    Procedure: INSERTION VENOUS ACCESS DEVICE;  Surgeon: Joby Barron Jr., MD;  Location: [facility];  Service: General   • VENOUS ACCESS DEVICE (PORT) REMOVAL N/A 10/30/2019    Procedure: Mediport Removal;  Surgeon: Joby Barron Jr., MD;  Location: [facility] OR;  Service: General      General Information     Row Name 07/06/22 1145          Physical Therapy Time and Intention    Document Type therapy note (daily note)  -DB     Mode of Treatment co-treatment;physical therapy;occupational therapy  -DB     Row Name 07/06/22 1145          General Information    Patient Profile Reviewed yes  -DB           User Key  (r) = Recorded By, (t) = Taken By, (c) = Cosigned By    Initials Name Provider Type    DB Rhonda Yancey PT Physical Therapist               Mobility     Row Name 07/06/22 1145          Bed Mobility    Bed Mobility supine-sit  -DB     Supine-Sit [place] (Bed Mobility) standby assist;verbal cues  -DB     Assistive Device (Bed Mobility) bed rails;head of bed elevated  -DB     Row Name 07/06/22 1145          Sit-Stand Transfer    Sit-Stand [place] (Transfers) contact guard  -DB     Assistive Device (Sit-Stand Transfers)  "walker, front-wheeled  -DB     Row Name 07/06/22 1145          Gait/Stairs (Locomotion)    Marshall Level (Gait) contact guard;verbal cues  -DB     Assistive Device (Gait) walker, front-wheeled  -DB     Deviations/Abnormal Patterns (Gait) antalgic;weight shifting decreased;gait speed decreased;stride length decreased  -DB     Bilateral Gait Deviations forward flexed posture;heel strike decreased  -DB     Comment, (Gait/Stairs) slightly impulsive with gait  -DB           User Key  (r) = Recorded By, (t) = Taken By, (c) = Cosigned By    Initials Name Provider Type    DB Rhonda Yancey, SENIA Physical Therapist               Obj/Interventions     Row Name 07/06/22 1146          Motor Skills    Therapeutic Exercise --  seated MIP, LAQ, AP (RLE) x 20; LLE gastroc stretch with towel 10x10\"  -DB     Row Name 07/06/22 1146          Balance    Balance Assessment sitting static balance;sitting dynamic balance;standing static balance;standing dynamic balance  -DB     Static Sitting Balance standby assist  -DB     Dynamic Sitting Balance standby assist  -DB     Position, Sitting Balance unsupported;sitting edge of bed  -DB     Static Standing Balance standby assist  -DB     Dynamic Standing Balance contact guard  -DB     Position/Device Used, Standing Balance walker, front-wheeled  -DB     Balance Interventions sitting;standing;sit to stand  -DB           User Key  (r) = Recorded By, (t) = Taken By, (c) = Cosigned By    Initials Name Provider Type    DB Rhonda Yancey, SENIA Physical Therapist               Goals/Plan    No documentation.                Clinical Impression     Row Name 07/06/22 1147          Pain    Pain Intervention(s) Ambulation/increased activity;Repositioned  -DB     Row Name 07/06/22 1147          Plan of Care Review    Plan of Care Reviewed With patient  -DB     Progress improving  -DB     Outcome Evaluation Pt was seen today for PT/OT co-treat to encourage participation and due to high pain levels in " previous sessions. Pt demo's significant improvement in her mobility today. Able to ambulate to the bathroom and perform toilet transfer and brush her teeth with OT, and then ambulates over to the chair to perform seated LE ther ex. Limited WB'ing through LLE d/t high pain levels. Dec'd AROM in L ankle, performed gastroc stretch with towel to work on ankle mobility. Pt does complain of pain throughout session, but able to complete all exercises asked of her. Based on today's session, pt would be appropriate for D/C to IRF.  -DB     Row Name 07/06/22 1147          Vital Signs    O2 Delivery Pre Treatment room air  -DB     O2 Delivery Intra Treatment room air  -DB     O2 Delivery Post Treatment room air  -DB     Pre Patient Position Supine  -DB     Intra Patient Position Standing  -DB     Post Patient Position Sitting  -DB     Row Name 07/06/22 1147          Positioning and Restraints    Pre-Treatment Position in bed  -DB     Post Treatment Position chair  -DB     In Chair notified nsg;reclined;call light within reach;encouraged to call for assist;exit alarm on  -DB           User Key  (r) = Recorded By, (t) = Taken By, (c) = Cosigned By    Initials Name Provider Type    Rhonda Adler PT Physical Therapist               Outcome Measures     Row Name 07/06/22 1152          How much help from another person do you currently need...    Turning from your back to your side while in flat bed without using bedrails? 4  -DB     Moving from lying on back to sitting on the side of a flat bed without bedrails? 3  -DB     Moving to and from a bed to a chair (including a wheelchair)? 3  -DB     Standing up from a chair using your arms (e.g., wheelchair, bedside chair)? 3  -DB     Climbing 3-5 steps with a railing? 2  -DB     To walk in hospital room? 3  -DB     AM-PAC 6 Clicks Score (PT) 18  -DB     Highest level of mobility 6 --> Walked 10 steps or more  -DB     Row Name 07/06/22 1150          Functional Assessment     Outcome Measure Options AM-PAC 6 Clicks Basic Mobility (PT)  -DB           User Key  (r) = Recorded By, (t) = Taken By, (c) = Cosigned By    Initials Name Provider Type    DB Rhonda Yancey, PT Physical Therapist                             Physical Therapy Education                 Title: PT OT SLP Therapies (Done)     Topic: Physical Therapy (Done)     Point: Mobility training (Done)     Learning Progress Summary           Patient Acceptance, E, VU by DB at 7/6/2022 1152    Acceptance, E,D, VU by PH at 7/5/2022 1544    Acceptance, E,D, Bed IU by RS at 7/3/2022 1335    Acceptance, E,TB, VU by  at 7/3/2022 0437    Acceptance, D,E, VU,NR by MS at 7/1/2022 1119    Acceptance, E,D, VU,NR by MS at 6/28/2022 1123                   Point: Home exercise program (Done)     Learning Progress Summary           Patient Acceptance, E, VU by DB at 7/6/2022 1152    Acceptance, E,D, VU by  at 7/5/2022 1544                   Point: Body mechanics (Done)     Learning Progress Summary           Patient Acceptance, E, VU by DB at 7/6/2022 1152    Acceptance, E,D, VU by PH at 7/5/2022 1544    Acceptance, E,D, Bed IU by  at 7/3/2022 1335    Acceptance, E,TB, VU by  at 7/3/2022 0437    Acceptance, D,E, VU,NR by MS at 7/1/2022 1119    Acceptance, E,D, VU,NR by MS at 6/28/2022 1123                   Point: Precautions (Done)     Learning Progress Summary           Patient Acceptance, E, VU by DB at 7/6/2022 1152    Acceptance, E,D, VU by PH at 7/5/2022 1544    Acceptance, E,TB, VU by  at 7/3/2022 0437    Acceptance, D,E, VU,NR by MS at 7/1/2022 1119    Acceptance, E,D, VU,NR by MS at 6/28/2022 1123                               User Key     Initials Effective Dates Name Provider Type Discipline    MS 06/16/21 -  Hardeep Miller, PT Physical Therapist PT    DB 06/16/21 -  Rhonda Yancey, PT Physical Therapist PT    RS 06/16/21 -  Thais Deutsch, PT Physical Therapist PT    PH 06/16/21 -  Amalia Yap, PTA  Physical Therapist Assistant PT     06/16/21 -  Emma Hopper RN Registered Nurse Nurse              PT Recommendation and Plan     Plan of Care Reviewed With: patient  Progress: improving  Outcome Evaluation: Pt was seen today for PT/OT co-treat to encourage participation and due to high pain levels in previous sessions. Pt lori's significant improvement in her mobility today. Able to ambulate to the bathroom and perform toilet transfer and brush her teeth with OT, and then ambulates over to the chair to perform seated LE ther ex. Limited WB'ing through LLE d/t high pain levels. Dec'd AROM in L ankle, performed gastroc stretch with towel to work on ankle mobility. Pt does complain of pain throughout session, but able to complete all exercises asked of her. Based on today's session, pt would be appropriate for D/C to IRF.     Time Calculation:    PT Charges     Row Name 07/06/22 1153             Time Calculation    Start Time 1118  -DB      Stop Time 1142  -DB      Time Calculation (min) 24 min  -DB      PT Received On 07/06/22  -DB      PT - Next Appointment 07/07/22  -DB              Time Calculation- PT    Total Timed Code Minutes- PT 24 minute(s)  -DB            User Key  (r) = Recorded By, (t) = Taken By, (c) = Cosigned By    Initials Name Provider Type    Rhonda Adler PT Physical Therapist              Therapy Charges for Today     Code Description Service Date Service Provider Modifiers Qty    82328486675  PT THER PROC EA 15 MIN 7/6/2022 Rhonda Yancey, PT GP 1    [id number]  PT THERAPEUTIC ACT EA 15 MIN 7/6/2022 Rhonda Yancey, PT GP 1          PT G-Codes  Outcome Measure Options: AM-PAC 6 Clicks Basic Mobility (PT)  AM-PAC 6 Clicks Score (PT): 18  AM-PAC 6 Clicks Score (OT): 10  Modified Susan Scale: 4 - Moderately severe disability.  Unable to walk without assistance, and unable to attend to own bodily needs without assistance.    Rhonda Yancey PT  7/6/2022

## 2022-07-06 NOTE — PROGRESS NOTES
Name: Marylu Georges ADMIT: 2022   : 1960  PCP: Elie Dixon MD    MRN: [id number] LOS: 7 days   AGE/SEX: 62 y.o. female  ROOM: [facility]   Subjective   Chief Complaint   Patient presents with   • Arm Swelling     Pain is fair  In/out of afib  Rate ok  On ABX    ROS  No f/c  No n/v  No cp/palp  No soa/cough    Objective   Vital Signs  Temp:  [98.5 °F (36.9 °C)-99.1 °F (37.3 °C)] 98.7 °F (37.1 °C)  Heart Rate:  [66-84] 66  Resp:  [16-18] 16  BP: (108-126)/(73-94) 114/73  SpO2:  [98 %-99 %] 98 %  on   ;   Device (Oxygen Therapy): room air  Body mass index is 25.02 kg/m².    Physical Exam  Constitutional:       General: She is not in acute distress.  HENT:      Head: Normocephalic and atraumatic.   Eyes:      General: No scleral icterus.  Cardiovascular:      Rate and Rhythm: Normal rate. Rhythm irregular.      Heart sounds: Normal heart sounds.   Pulmonary:      Effort: Pulmonary effort is normal. No respiratory distress.   Abdominal:      General: There is no distension.      Palpations: Abdomen is soft.   Musculoskeletal:      Cervical back: Neck supple.   Neurological:      Mental Status: She is alert.   Psychiatric:         Behavior: Behavior normal.         Results Review:       I reviewed the patient's new clinical results.  Results from last 7 days   Lab Units 22  0554 22  0554 22  05   WBC 10*3/mm3 5.45 6.00 6.38 9.15   HEMOGLOBIN g/dL 11.6* 11.3* 11.9* 11.9*   PLATELETS 10*3/mm3 468* 415 359 357     Results from last 7 days   Lab Units 22  0554 22  0554 22  0418 22  05   SODIUM mmol/L 134* 135*  --  133* 136   POTASSIUM mmol/L 4.2 4.5 5.0 4.4 4.1   CHLORIDE mmol/L 101 101  --  97* 97*   CO2 mmol/L 25.5 24.0  --  25.0 28.0   BUN mg/dL 16 14  --  15 16   CREATININE mg/dL 0.61 0.55*  --  0.61 0.72   GLUCOSE mg/dL 93 93  --  96 111*   Estimated Creatinine Clearance: 103.4 mL/min (by C-G formula based on SCr of 0.61  mg/dL).  Results from last 7 days   Lab Units 07/06/22  0554 07/05/22  0554 07/03/22  0611 07/02/22  0527 06/30/22  0414   ALBUMIN g/dL 3.10* 3.00* 2.70* 3.00* 2.90*   BILIRUBIN mg/dL 0.5  --  0.5 0.6 0.3   ALK PHOS U/L 105  --  109 124* 139*   AST (SGOT) U/L 37*  --  41* 61* 105*   ALT (SGPT) U/L 44*  --  50* 61* 102*     Results from last 7 days   Lab Units 07/06/22  0554 07/05/22  0554 07/04/22  0418 07/03/22  0611 07/02/22  0527   CALCIUM mg/dL 8.9 8.7  --  8.6 8.9   ALBUMIN g/dL 3.10* 3.00*  --  2.70* 3.00*   MAGNESIUM mg/dL  --   --  2.1  --   --    PHOSPHORUS mg/dL  --  3.9  --   --   --          Coag     HbA1C   Lab Results   Component Value Date    HGBA1C 5.60 07/30/2019     Infection   Results from last 7 days   Lab Units 06/30/22  1250   BODYFLDCX  No growth at 5 days     Radiology(recent) MRI Hand Left With & Without Contrast    Result Date: 7/4/2022  1. Suspected septic arthritis at the 2nd MCP joint where there is joint space narrowing with joint effusion and surrounding synovitis and soft tissue inflammation. Mild marrow edema within the radial aspects of the 2nd metacarpal head and base of the 2nd proximal phalanx suspicious for early osteomyelitis though no cortical loss is demonstrated. 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 There are also advanced arthritic changes at the 1st CMC joint.  This report was finalized on 7/4/2022 4:38 PM by Dr. Andrae Rivas M.D.      No results found for: TROPONINT, TROPONINI, BNP  No components found for: TSH;2    cefTRIAXone, 2 g, Intravenous, Q24H  clobetasol, 1 application, Topical, Q12H  digoxin, 125 mcg, Oral, Daily  enoxaparin, 1 mg/kg, Subcutaneous, Q12H  exemestane, 25 mg, Oral, Daily  famotidine, 20 mg, Oral, BID AC  hydrOXYzine pamoate, 25 mg, Oral, TID  ibuprofen, 600 mg, Oral, Daily  lidocaine, 1 patch, Transdermal, Q24H  metoprolol tartrate, 50 mg, Oral, Q12H  sodium chloride, 10 mL,  Intravenous, Q12H       Diet Regular      Assessment & Plan      Active Hospital Problems    Diagnosis  POA   • **Atrial fibrillation (HCC) [I48.91]  Yes   • New onset atrial fibrillation (HCC) [I48.91]  Yes   • Inflammatory arthritis [M19.90]  Yes   • Transaminitis [R74.01]  Yes   • Lymphedema of upper extremity, bilateral [I89.0]  Yes   • Rash [R21]  Yes   • Streptococcal arthritis of left ankle (HCC) [M00.272]  Unknown   • Malignant neoplasm of overlapping sites of left breast in female, estrogen receptor positive (HCC) [C50.812, Z17.0]  Not Applicable      Resolved Hospital Problems   No resolved problems to display.       62 year old woman with a history of left sided breast cancer s/p bilateral mastectomy in 2019 and chemo/xrt on aromasin, history of a pericardial mass which resolved with chemotherapy, history of cva, essential hypertension who presented from home with bilateral upper extremity swelling and swelling in her left ankle and a rash on the right arm. She also had new onset Afib with RVR.  Arthrocentesis of the left ankle very surprisingly showed strep c. She is s/p wash out. MRI of the left shoulder showed synovitis. MRI of the lumbar spine showed discitis and myositis.  MRI  does show MCP septic arthritis.               · New onset afib with rvr-tsh was normal. Echocardiogram with patent foramen ovale and severely dilated left atrial cavity. Continue digoxin, and metoprolol. On therapeutic lovenox for AC. Will transition to eliquis 5mg po BID  · Left ankle septic arthritis with group c strep-s/p I&D on 6/30/22 by Dr. Tran. Ceftriaxone 2 g IV every 24 hours, stop date August 10, 2022. Check weekly CBC with differential, serum creatinine and fax to Dr. Grossman at [phone].  Follow-up with Dr. Grossman in the infectious diseases clinic on August 10, 2022        · Discitis, myositis of the lumbar spine-spine surgery has evaluated. No surgical intervention is recommend at this time.  On  ceftriaxone as above.   · Left shoulder synovitis-went for joint aspiration on 6/30/22, but only scant material was able to be obtained. Culture is negative at 2 days.  · Left hand second MCP joint septic arthritis-MRI completed .  Hand surgery had seen and no current surgical plans.   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 cellulitis of the bilateral upper extremities and chest-greatly improved with topical steroids, antihistamines, and abx.  · Pain related to the above-continue norco, muscle relaxant, lidocaine patches, bowel regimen.    · Elevated liver enzymes-acute hepatitis panel was negative. Liver u/s showed some mild dilation of the pancreatic duct. Lipase was slightly elevated. Her LFTs are trending down and she's not having any abdominal pain/nausea/vomiting, so will hold off on MRCP for the time being unless LFTs worsen again or she develops abdominal symptoms.  · History of left sided breast cancer s/p bilateral mastectomy in 2019, chemo/xrt-aromasin  · History of pericardial mass which resolved with chemotherapy-not seen on TTE this admission  · History of CVA  · Essential hypertension  · pepcid for gi ppx   · Discussed with patient and nursing staff.      · Anticipate discharge possible acute rehab- having evaluation today        DW staff  Reviewed records      Frnak Wadsworth MD  [facility]  07/06/22  13:13 EDT

## 2022-07-06 NOTE — PROGRESS NOTES
ID note for cellulitis?  S: In regards to her shoulder septic arthritis, she says this is improved.  In regards to her discitis, she also thinks this is improved and basically pain-free as long as she is staying on top of the analgesia.  In regards to the left ankle, septic arthritis, she had some pain with bearing weight yesterday but overall improved.  In regards to the left hand septic arthritis, she was evaluated by hand surgery yesterday.  Reviewed their notes and recommended medical management.  She says this is feeling better.    ROS:  Tolerating abx: no no n/v/d, rash  No f/c/ns    O: Temp:  [98.5 °F (36.9 °C)-99.1 °F (37.3 °C)] 98.7 °F (37.1 °C)  Heart Rate:  [66-84] 66  Resp:  [16-18] 16  BP: (108-126)/(73-94) 114/73  GENERAL: nad  HEENT: Oropharynx is clear. Hearing is grossly normal.    left shoulder full ROM, l second mcp joint swollen      White count 5.45, hemoglobin 11.6, platelets 468  Creatinine 0.61    6/28 Blood cultures no growth to date  6/28 left ankle arthrocentesis culture Group C strep (red blood cells 200,000, white blood cells 57,400, negative crystals)  6/30 left shoulder aspiration cultures no growth    A/p  1.  Septic arthritis L ankle d/t Group C strep, s/p I and D on 6/30  2.  Left shoulder septic arthritis  3.  Lumbar discitis  4.  Left hand septic arthritis  5.  Aortic valve calcification with suspected endocarditis, no clear vegetation and in discussions with cardiology holding on KENNETH as will receive medical therapy for endocarditis by treatment of the discitis    Appreciate help from orthopedic surgery, spine surgery and hand surgery.  Discussed with Dr. Johns yesterday and holding on KENNETH at this time  Plan is for 6 weeks of Rocephin as below  PICC ordered and no objection to discharge when okay with others.    Final infectious disease recommendations  1.  Ceftriaxone 2 g IV every 24 hours, stop date August 10, 2022  2.  Check weekly CBC with differential, serum creatinine and fax  to me at [phone]  3.  Follow-up with me in the infectious diseases clinic on August 10, 2022

## 2022-07-06 NOTE — PLAN OF CARE
Pt with much improved participation this AM. Mod I for bed mobility. Her BLE remain with edema and impaired AROM/strength for LBD - max A provided. CGA + walker for mobility from the EOB->bathroom. She completed a seated rest break on the commode due to fatigue from her transfer. CGA to stand and mobilize to the sinkside for standing sinkside grooming ADL's. S/S provided for standing grooming - strong forward flexed lean due to BLE pain. She made her way to the bedside chair with PT where she participated in BUE/BLE exercises. Shoulder and hand pain much improved. Education provided on acute rehab and she is very motivated. BAR recommended.    Patient was not wearing a face mask during this therapy encounter. Therapist used appropriate personal protective equipment including mask and gloves. Mask used was standard procedure mask. Appropriate PPE was worn during the entire therapy session. Hand hygiene was completed before and after therapy session. Patient is not in enhanced droplet precautions.

## 2022-07-07 LAB — GLUCOSE BLDC GLUCOMTR-MCNC: 128 MG/DL (ref 70–130)

## 2022-07-07 PROCEDURE — 25010000002 CEFTRIAXONE PER 250 MG: Performed by: HOSPITALIST

## 2022-07-07 PROCEDURE — 82962 GLUCOSE BLOOD TEST: CPT

## 2022-07-07 PROCEDURE — 97535 SELF CARE MNGMENT TRAINING: CPT

## 2022-07-07 PROCEDURE — 97110 THERAPEUTIC EXERCISES: CPT

## 2022-07-07 PROCEDURE — 99232 SBSQ HOSP IP/OBS MODERATE 35: CPT | Performed by: INTERNAL MEDICINE

## 2022-07-07 PROCEDURE — 97530 THERAPEUTIC ACTIVITIES: CPT

## 2022-07-07 RX ADMIN — FAMOTIDINE 20 MG: 20 TABLET ORAL at 18:35

## 2022-07-07 RX ADMIN — IBUPROFEN 600 MG: 600 TABLET, FILM COATED ORAL at 18:35

## 2022-07-07 RX ADMIN — HYDROCODONE BITARTRATE AND ACETAMINOPHEN 1 TABLET: 7.5; 325 TABLET ORAL at 10:27

## 2022-07-07 RX ADMIN — CLOBETASOL PROPIONATE 1 APPLICATION: 0.5 CREAM TOPICAL at 20:35

## 2022-07-07 RX ADMIN — HYDROCODONE BITARTRATE AND ACETAMINOPHEN 1 TABLET: 7.5; 325 TABLET ORAL at 14:11

## 2022-07-07 RX ADMIN — TIZANIDINE 4 MG: 4 TABLET ORAL at 20:34

## 2022-07-07 RX ADMIN — HYDROXYZINE PAMOATE 25 MG: 25 CAPSULE ORAL at 08:13

## 2022-07-07 RX ADMIN — FAMOTIDINE 20 MG: 20 TABLET ORAL at 06:31

## 2022-07-07 RX ADMIN — HYDROCODONE BITARTRATE AND ACETAMINOPHEN 1 TABLET: 7.5; 325 TABLET ORAL at 18:35

## 2022-07-07 RX ADMIN — EXEMESTANE 25 MG: 25 TABLET, FILM COATED ORAL at 08:14

## 2022-07-07 RX ADMIN — HYDROCODONE BITARTRATE AND ACETAMINOPHEN 1 TABLET: 7.5; 325 TABLET ORAL at 06:31

## 2022-07-07 RX ADMIN — TIZANIDINE 4 MG: 4 TABLET ORAL at 08:13

## 2022-07-07 RX ADMIN — METOPROLOL TARTRATE 50 MG: 25 TABLET, FILM COATED ORAL at 08:13

## 2022-07-07 RX ADMIN — APIXABAN 5 MG: 5 TABLET, FILM COATED ORAL at 20:34

## 2022-07-07 RX ADMIN — METOPROLOL TARTRATE 75 MG: 25 TABLET, FILM COATED ORAL at 20:34

## 2022-07-07 RX ADMIN — Medication 10 ML: at 20:35

## 2022-07-07 RX ADMIN — CLOBETASOL PROPIONATE 1 APPLICATION: 0.5 CREAM TOPICAL at 08:15

## 2022-07-07 RX ADMIN — HYDROCODONE BITARTRATE AND ACETAMINOPHEN 1 TABLET: 7.5; 325 TABLET ORAL at 00:01

## 2022-07-07 RX ADMIN — METOPROLOL TARTRATE 25 MG: 25 TABLET, FILM COATED ORAL at 10:27

## 2022-07-07 RX ADMIN — LIDOCAINE 1 PATCH: 50 PATCH TOPICAL at 20:35

## 2022-07-07 RX ADMIN — DIGOXIN 125 MCG: 125 TABLET ORAL at 12:49

## 2022-07-07 RX ADMIN — Medication 10 ML: at 08:15

## 2022-07-07 RX ADMIN — APIXABAN 5 MG: 5 TABLET, FILM COATED ORAL at 08:13

## 2022-07-07 RX ADMIN — CEFTRIAXONE SODIUM 2 G: 2 INJECTION, POWDER, FOR SOLUTION INTRAMUSCULAR; INTRAVENOUS at 12:49

## 2022-07-07 RX ADMIN — TIZANIDINE 4 MG: 4 TABLET ORAL at 01:23

## 2022-07-07 RX ADMIN — TIZANIDINE 4 MG: 4 TABLET ORAL at 14:11

## 2022-07-07 RX ADMIN — HYDROXYZINE PAMOATE 25 MG: 25 CAPSULE ORAL at 18:35

## 2022-07-07 RX ADMIN — HYDROCODONE BITARTRATE AND ACETAMINOPHEN 1 TABLET: 7.5; 325 TABLET ORAL at 22:27

## 2022-07-07 RX ADMIN — HYDROXYZINE PAMOATE 25 MG: 25 CAPSULE ORAL at 20:34

## 2022-07-07 NOTE — PROGRESS NOTES
[facility] Acute Rehab  Discussed pt with Dr. Wu. He has accepted pt for acute rehab pending bed availability. We currently do not have a bed anticipated open until mid next week. CCP Mirella called and informed.     Of note, with OT yesterday, it is documented that HR went to 190 during tx. If a bed opened, would need HR more under control to tolerate 3 hours of Acute Rehab a day    Will continue to follow     Kaylie Valverde RN  Acute Rehab Admission Nurse

## 2022-07-07 NOTE — PLAN OF CARE
Goal Outcome Evaluation:  Plan of Care Reviewed With: patient        Progress: improving  Outcome Evaluation: Pain under controlled, no complaints. Pw restarted during the night per pt's request. VSS, will continue to monitor.

## 2022-07-07 NOTE — PROGRESS NOTES
ID note for cellulitis?  S:  She continues to feel better day by day.  No fevers or chills or night sweats.  Tolerating antibiotics.  Left hand continues to improve    O: Temp:  [97.5 °F (36.4 °C)-99.3 °F (37.4 °C)] 98.7 °F (37.1 °C)  Heart Rate:  [67-82] 76  Resp:  [17-18] 18  BP: (102-119)/(69-85) 111/85  GENERAL: nad  HEENT: Oropharynx is clear. Hearing is grossly normal.    left shoulder full ROM, l second mcp joint swollen      6/28 Blood cultures no growth to date  6/28 left ankle arthrocentesis culture Group C strep (red blood cells 200,000, white blood cells 57,400, negative crystals)  6/30 left shoulder aspiration cultures no growth    A/p  1.  Septic arthritis L ankle d/t Group C strep, s/p I and D on 6/30  2.  Left shoulder septic arthritis  3.  Lumbar discitis  4.  Left hand septic arthritis  5.  Aortic valve calcification with suspected endocarditis, no clear vegetation and in discussions with cardiology holding on KENNETH as will receive medical therapy for endocarditis by treatment of the discitis    PICC line ordered.  6 weeks of IV Rocephin as below.  No objection to discharge when okay with others.  We will see as needed.    Final infectious disease recommendations  1.  Ceftriaxone 2 g IV every 24 hours, stop date August 10, 2022  2.  Check weekly CBC with differential, serum creatinine and fax to me at [phone]  3.  Follow-up with me in the infectious diseases clinic on August 10, 2022

## 2022-07-07 NOTE — PROGRESS NOTES
Orthopedic Progress Note      Patient: Marylu Georges    YOB: 1960    Medical Record Number: [id number]    Attending Physician: Frank Wadsworth MD    Date of Admission: 6/27/2022  6:51 AM    Admitting Dx:  Elevated C-reactive protein (CRP) [R79.82]  Elevated sed rate [R70.0]  Elevated LFTs [R79.89]  Edema of both upper extremities [R60.0]  Atrial fibrillation, unspecified type (HCC) [I48.91]  New onset atrial fibrillation (HCC) [I48.91]    Current Problem List:   Patient Active Problem List   Diagnosis   • Malignant neoplasm of overlapping sites of left breast in female, estrogen receptor positive (HCC)   • Family history of breast cancer in female   • Syncope   • Malignant neoplasm of overlapping sites of both breasts in female, estrogen receptor positive (HCC)   • Hypertension   • Encounter for long-term (current) use of other medications   • Drug-induced hepatitis   • Atrial fibrillation (HCC)   • Transaminitis   • Lymphedema of upper extremity, bilateral   • Rash   • Inflammatory arthritis   • Streptococcal arthritis of left ankle (HCC)   • New onset atrial fibrillation (HCC)         Past Medical History:   Diagnosis Date   • Anemia in neoplastic disease    • Arthritis    • Breast cancer (HCC)     Left   • CVA (cerebral vascular accident) (HCC)    • Hip pain     RIGHT HIP... CYST   • History of fracture of leg 1987   • History of radiation therapy     LAST TREATMENT     • Hypertension    • Limited joint range of motion (ROM)     RIGHT HIP   • Skin sore     OPEN SORE LEFT BREAST   • Syncope    • Vertigo        Current Medications:  Scheduled Meds:apixaban, 5 mg, Oral, Q12H  cefTRIAXone, 2 g, Intravenous, Q24H  clobetasol, 1 application, Topical, Q12H  digoxin, 125 mcg, Oral, Daily  exemestane, 25 mg, Oral, Daily  famotidine, 20 mg, Oral, BID AC  hydrOXYzine pamoate, 25 mg, Oral, TID  ibuprofen, 600 mg, Oral, Daily  lidocaine, 1 patch, Transdermal, Q24H  metoprolol tartrate, 25  mg, Oral, Once  metoprolol tartrate, 75 mg, Oral, Q12H  sodium chloride, 10 mL, Intravenous, Q12H      PRN Meds:.senna-docusate sodium **AND** polyethylene glycol **AND** bisacodyl **AND** bisacodyl  •  HYDROcodone-acetaminophen  •  nitroglycerin  •  ondansetron **OR** ondansetron  •  simethicone  •  [COMPLETED] Insert peripheral IV **AND** sodium chloride  •  sodium chloride  •  tiZANidine  •  traZODone    SUBJECTIVE: 62 y.o.  female    OBJECTIVE:   Vitals:    07/07/22 0144 07/07/22 0333 07/07/22 0544 07/07/22 0739   BP:    111/85   BP Location:    Right arm   Patient Position:    Sitting   Pulse: 73 67 68 76   Resp:    18   Temp:    98.7 °F (37.1 °C)   TempSrc:    Oral   SpO2: 97% 96% 98% 96%   Weight:       Height:         I/O last 3 completed shifts:  In: 238 [P.O.:238]  Out: 1550 [Urine:1550]    Diagnostic Tests:   Lab Results (last 24 hours)     ** No results found for the last 24 hours. **          PHYSICAL EXAM: Patient is presently sitting up in the chair and is feeling some better.  Continues to complain of back pain which is worse with any kind of movement but it does settle down after taking her medication.  Most of her pain is primarily back she denies any leg pain.  Left anterior ankle incision is intact dressing remains dry.  She is beginning to work on some range of motion with her ankle.  Swelling to her left foot has improved since removing the Ace bandage.  Patient is progressing with PT and is feeling better.  Remains on IV antibiotics.     ASSESSMENT & PLAN:    Patient has been approved for acute rehab however will need a preauthorization from her insurance.  Unfortunately acute rehab does not have any beds available until next week therefore CCP is working on possible subacute placement.  Patient is also talking about possibly going home with home health since she is progressing with her ambulation.  Recommended that she increase her mobilization as she can tolerate with both PT and staff.   Will also fit her with a warm-and-form lumbar corset to wear when she is out of bed for support    Patient is return to the office in about a month to see Dr. Meredith for follow-up on her back.  Sooner if need be    Date: 7/7/2022    Maria Isabel Tam RN

## 2022-07-07 NOTE — PLAN OF CARE
Pt participated in OT this morning. Mod I for bed mobility. CGA STS and mobility to the bathroom. Slow and steady pacing with mobility due to ankle discomfort. She completed toileting with supervision and transitioned to sinkside for grooming. Her HR read between 160-190bpm on the monitor - she sat at sinkside for a sitting rest break to complete her ADL's from this position. HR decreased to 120's with sitting rest. She required a second commode transfer and S/S for toileting. She returned to the bedside chair with CGA. She remains with impaired activity tolerance and elevated pain with activity. She continues to remain very motivated to improve and acute rehab recommended.     Patient was not wearing a face mask during this therapy encounter. Therapist used appropriate personal protective equipment including mask and gloves. Mask used was standard procedure mask. Appropriate PPE was worn during the entire therapy session. Hand hygiene was completed before and after therapy session. Patient is not in enhanced droplet precautions.

## 2022-07-07 NOTE — PROGRESS NOTES
Name: Marylu Georges ADMIT: 2022   : 1960  PCP: Elie Dixon MD    MRN: [id number] LOS: 8 days   AGE/SEX: 62 y.o. female  ROOM: [facility]   Subjective   Chief Complaint   Patient presents with   • Arm Swelling     Pain is fair  In/out of afib  Rate high earlier but ok now  On ABX    ROS  No f/c  No n/v  No cp/palp  No soa/cough    Objective   Vital Signs  Temp:  [97.5 °F (36.4 °C)-99.3 °F (37.4 °C)] 98.7 °F (37.1 °C)  Heart Rate:  [67-82] 76  Resp:  [17-18] 18  BP: (102-119)/(69-85) 111/85  SpO2:  [86 %-98 %] 96 %  on   ;   Device (Oxygen Therapy): room air  Body mass index is 25.02 kg/m².    Physical Exam  Constitutional:       General: She is not in acute distress.  HENT:      Head: Normocephalic and atraumatic.   Eyes:      General: No scleral icterus.  Cardiovascular:      Rate and Rhythm: Normal rate. Rhythm irregular.      Heart sounds: Normal heart sounds.   Pulmonary:      Effort: Pulmonary effort is normal. No respiratory distress.   Abdominal:      General: There is no distension.      Palpations: Abdomen is soft.   Musculoskeletal:      Cervical back: Neck supple.   Neurological:      Mental Status: She is alert.   Psychiatric:         Behavior: Behavior normal.     inflammatory joint changes to multiple joints    Results Review:       I reviewed the patient's new clinical results.  Results from last 7 days   Lab Units 22  0554 22  0554 22  0622  0527   WBC 10*3/mm3 5.45 6.00 6.38 9.15   HEMOGLOBIN g/dL 11.6* 11.3* 11.9* 11.9*   PLATELETS 10*3/mm3 468* 415 359 357     Results from last 7 days   Lab Units 22  0554 22  0554 22  0418 22  0611 22  0527   SODIUM mmol/L 134* 135*  --  133* 136   POTASSIUM mmol/L 4.2 4.5 5.0 4.4 4.1   CHLORIDE mmol/L 101 101  --  97* 97*   CO2 mmol/L 25.5 24.0  --  25.0 28.0   BUN mg/dL 16 14  --  15 16   CREATININE mg/dL 0.61 0.55*  --  0.61 0.72   GLUCOSE mg/dL 93 93  --  96 111*   Estimated  Creatinine Clearance: 103.4 mL/min (by C-G formula based on SCr of 0.61 mg/dL).  Results from last 7 days   Lab Units 07/06/22  0554 07/05/22  0554 07/03/22  0611 07/02/22  0527   ALBUMIN g/dL 3.10* 3.00* 2.70* 3.00*   BILIRUBIN mg/dL 0.5  --  0.5 0.6   ALK PHOS U/L 105  --  109 124*   AST (SGOT) U/L 37*  --  41* 61*   ALT (SGPT) U/L 44*  --  50* 61*     Results from last 7 days   Lab Units 07/06/22  0554 07/05/22  0554 07/04/22  0418 07/03/22  0611 07/02/22  0527   CALCIUM mg/dL 8.9 8.7  --  8.6 8.9   ALBUMIN g/dL 3.10* 3.00*  --  2.70* 3.00*   MAGNESIUM mg/dL  --   --  2.1  --   --    PHOSPHORUS mg/dL  --  3.9  --   --   --          Coag     HbA1C   Lab Results   Component Value Date    HGBA1C 5.60 07/30/2019     Infection   Results from last 7 days   Lab Units 06/30/22  1250   BODYFLDCX  No growth at 5 days     Radiology(recent) No radiology results for the last day  No results found for: TROPONINT, TROPONINI, BNP  No components found for: TSH;2    apixaban, 5 mg, Oral, Q12H  cefTRIAXone, 2 g, Intravenous, Q24H  clobetasol, 1 application, Topical, Q12H  digoxin, 125 mcg, Oral, Daily  exemestane, 25 mg, Oral, Daily  famotidine, 20 mg, Oral, BID AC  hydrOXYzine pamoate, 25 mg, Oral, TID  ibuprofen, 600 mg, Oral, Daily  lidocaine, 1 patch, Transdermal, Q24H  metoprolol tartrate, 75 mg, Oral, Q12H  sodium chloride, 10 mL, Intravenous, Q12H       Diet Regular      Assessment & Plan      Active Hospital Problems    Diagnosis  POA   • **Streptococcal arthritis of left ankle (HCC) [M00.272]  Yes   • New onset atrial fibrillation (HCC) [I48.91]  Yes   • Inflammatory arthritis [M19.90]  Yes   • Atrial fibrillation (HCC) [I48.91]  Yes   • Transaminitis [R74.01]  Yes   • Lymphedema of upper extremity, bilateral [I89.0]  Yes   • Rash [R21]  Yes   • Malignant neoplasm of overlapping sites of left breast in female, estrogen receptor positive (HCC) [C50.812, Z17.0]  Not Applicable      Resolved Hospital Problems   No resolved  problems to display.       62 year old woman with a history of left sided breast cancer s/p bilateral mastectomy in 2019 and chemo/xrt on aromasin, history of a pericardial mass which resolved with chemotherapy, history of cva, essential hypertension who presented from home with bilateral upper extremity swelling and swelling in her left ankle and a rash on the right arm. She also had new onset Afib with RVR.  Arthrocentesis of the left ankle very surprisingly showed strep c. She is s/p wash out. MRI of the left shoulder showed synovitis. MRI of the lumbar spine showed discitis and myositis.  MRI  does show MCP septic arthritis.               · New onset afib with rvr-tsh was normal. Echocardiogram with patent foramen ovale and severely dilated left atrial cavity. Continue digoxin, and metoprolol. On therapeutic lovenox for AC. Will transition to eliquis 5mg po BID. Increase metoprolol today  · Left ankle septic arthritis with group c strep-s/p I&D on 6/30/22 by Dr. Tran. Ceftriaxone 2 g IV every 24 hours, stop date August 10, 2022. Check weekly CBC with differential, serum creatinine and fax to Dr. Grossman at [phone].  Follow-up with Dr. Grossman in the infectious diseases clinic on August 10, 2022        · Discitis, myositis of the lumbar spine-spine surgery has evaluated. No surgical intervention is recommend at this time.  On ceftriaxone as above.   · Left shoulder synovitis-went for joint aspiration on 6/30/22, but only scant material was able to be obtained. Culture is negative at 2 days.  · Left hand second MCP joint septic arthritis-MRI completed .  Hand surgery had seen and no current surgical plans.   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 Recommended to patient to follow up with Rheumatology as outpatient.   · Urticarial eruption vs. cellulitis of the bilateral upper extremities and chest-greatly improved with topical steroids,  antihistamines, and abx.  · Pain related to the above-continue norco, muscle relaxant, lidocaine patches, bowel regimen.    · Elevated liver enzymes-acute hepatitis panel was negative. Liver u/s showed some mild dilation of the pancreatic duct. Lipase was slightly elevated. Her LFTs are trending down and she's not having any abdominal pain/nausea/vomiting, so will hold off on MRCP for the time being unless LFTs worsen again or she develops abdominal symptoms.  · History of left sided breast cancer s/p bilateral mastectomy in 2019, chemo/xrt-aromasin  · History of pericardial mass which resolved with chemotherapy-not seen on TTE this admission  · History of CVA  · Essential hypertension  · pepcid for gi ppx   · Discussed with patient and nursing staff.      · Anticipate discharge possible acute rehab vs subacute rehab    Greater than 36 minutes spent with greater than 50% counseling and coordinating care      DW staff  Reviewed records      Frank Wadsworth MD  [facility]  07/07/22  13:13 EDT

## 2022-07-07 NOTE — PROGRESS NOTES
Patient is seen and examined.    Overall, patient feels her L index finger is about the same although pain and range of motion might be slightly better.    We discussed about further treatment options and decide to continue nonsurgical treatment.     Will continue to follow.

## 2022-07-07 NOTE — NURSING NOTE
S/w Dr. Grossman about IV RN concerns for PICC placement in pt with hx double mastectomy. After confirming with pt oncologist, Dr. Dixon, Dr. Grossman states pt is okay to have PICC placement, preferably in R arm.

## 2022-07-07 NOTE — THERAPY TREATMENT NOTE
Patient Name: Marylu Georges  : 1960    MRN: [id number]                              Today's Date: 2022       Admit Date: 2022    Visit Dx:     ICD-10-CM ICD-9-CM   1. Streptococcal arthritis of left ankle (HCC)  M00.272 711.07     041.00   2. Atrial fibrillation, unspecified type (HCC)  I48.91 427.31   3. Elevated LFTs  R79.89 790.6   4. Elevated C-reactive protein (CRP)  R79.82 790.95   5. Elevated sed rate  R70.0 790.1   6. Edema of both upper extremities  R60.0 782.3     Patient Active Problem List   Diagnosis   • Malignant neoplasm of overlapping sites of left breast in female, estrogen receptor positive (HCC)   • Family history of breast cancer in female   • Syncope   • Malignant neoplasm of overlapping sites of both breasts in female, estrogen receptor positive (HCC)   • Hypertension   • Encounter for long-term (current) use of other medications   • Drug-induced hepatitis   • Atrial fibrillation (HCC)   • Transaminitis   • Lymphedema of upper extremity, bilateral   • Rash   • Inflammatory arthritis   • Streptococcal arthritis of left ankle (HCC)   • New onset atrial fibrillation (HCC)     Past Medical History:   Diagnosis Date   • Anemia in neoplastic disease    • Arthritis    • Breast cancer (HCC)     Left   • CVA (cerebral vascular accident) (HCC)    • Hip pain     RIGHT HIP... CYST   • History of fracture of leg    • History of radiation therapy     LAST TREATMENT     • Hypertension    • Limited joint range of motion (ROM)     RIGHT HIP   • Skin sore     OPEN SORE LEFT BREAST   • Syncope    • Vertigo      Past Surgical History:   Procedure Laterality Date   • AXILLARY LYMPH NODE BIOPSY/EXCISION Right     LYMPH NODE UNDER RIGHT ARM-MALIGNANT (DOUBLE MASTECTOMY)   • BREAST BIOPSY Left     MALIGNANT   • FRACTURE SURGERY      Leg   • HARDWARE REMOVAL     • INCISION AND DRAINAGE LEG Left 2022    Procedure: INCISION AND DRAINAGE left ankle;  Surgeon: Kenneth Tran MD;   Location: [facility] OR;  Service: Orthopedics;  Laterality: Left;   • MASTECTOMY W/ SENTINEL NODE BIOPSY Bilateral 9/16/2019    Procedure: BILATERLA MODIFIED RADICAL MASTECTOMY WITH BILATERAL SENTINEL LYMPH NODE BIOPSY;  Surgeon: Joby Barron Jr., MD;  Location: [facility] OR;  Service: General   • TOTAL HIP ARTHROPLASTY Right 3/2/2020    Procedure: RIGHT TOTAL HIP ARTHROPLASTY NATALY NAVIGATION;  Surgeon: Luis M Leonard MD;  Location: [facility] MAIN OR;  Service: Orthopedics;  Laterality: Right;   • TOTAL HIP ARTHROPLASTY Left 7/20/2021    Procedure: Posterior LEFT TOTAL HIP ARTHROPLASTY NATALY NAVIGATION;  Surgeon: Luis M Leonard MD;  Location: [facility] MAIN OR;  Service: Orthopedics;  Laterality: Left;   • VENOUS ACCESS DEVICE (PORT) INSERTION Right 2/1/2019    Procedure: INSERTION VENOUS ACCESS DEVICE;  Surgeon: Joby Barron Jr., MD;  Location: [facility];  Service: General   • VENOUS ACCESS DEVICE (PORT) REMOVAL N/A 10/30/2019    Procedure: Mediport Removal;  Surgeon: Joby Barron Jr., MD;  Location: [facility] OR;  Service: General      General Information     Row Name 07/07/22 1534          Physical Therapy Time and Intention    Document Type therapy note (daily note)  -DB     Mode of Treatment individual therapy;physical therapy  -DB     Row Name 07/07/22 1534          General Information    Patient Profile Reviewed yes  -DB           User Key  (r) = Recorded By, (t) = Taken By, (c) = Cosigned By    Initials Name Provider Type    DB Rhonda Yancey PT Physical Therapist               Mobility     Row Name 07/07/22 1534          Bed Mobility    Bed Mobility supine-sit;sit-supine  -DB     Supine-Sit [place] (Bed Mobility) supervision  -DB     Sit-Supine [place] (Bed Mobility) supervision  -DB     Assistive Device (Bed Mobility) bed rails;head of bed elevated  -DB     Row Name 07/07/22 1534          Sit-Stand Transfer    Sit-Stand [place] (Transfers) verbal cues;contact guard   -DB     Assistive Device (Sit-Stand Transfers) walker, front-wheeled  -DB     Row Name 07/07/22 1534          Gait/Stairs (Locomotion)    [place] Level (Gait) contact guard;verbal cues;standby assist  -DB     Assistive Device (Gait) walker, front-wheeled  -DB     Distance in Feet (Gait) 20'  -DB     Deviations/Abnormal Patterns (Gait) antalgic;weight shifting decreased;gait speed decreased;stride length decreased  -DB     Bilateral Gait Deviations forward flexed posture;heel strike decreased  -DB           User Key  (r) = Recorded By, (t) = Taken By, (c) = Cosigned By    Initials Name Provider Type    DB Rhonda Yancey, PT Physical Therapist               Obj/Interventions     Row Name 07/07/22 1535          Motor Skills    Therapeutic Exercise other (see comments)  BLE MIP, LAQ; RLE AP; LLE DF towel stretch  -DB     Row Name 07/07/22 1535          Balance    Balance Assessment sitting static balance;sitting dynamic balance;standing static balance;standing dynamic balance  -DB     Static Sitting Balance supervision  -DB     Dynamic Sitting Balance supervision  -DB     Position, Sitting Balance unsupported;sitting edge of bed  -DB     Static Standing Balance standby assist  -DB     Dynamic Standing Balance standby assist  -DB     Position/Device Used, Standing Balance supported;walker, front-wheeled  -DB     Balance Interventions sitting;standing;sit to stand  -DB           User Key  (r) = Recorded By, (t) = Taken By, (c) = Cosigned By    Initials Name Provider Type    DB Rhonda Yancey, PT Physical Therapist               Goals/Plan    No documentation.                Clinical Impression     Row Name 07/07/22 1537          Pain    Pain Intervention(s) Ambulation/increased activity;Repositioned  -DB     Row Name 07/07/22 1537          Plan of Care Review    Plan of Care Reviewed With patient  -DB     Progress improving  -DB     Outcome Evaluation Pt agreeable to work with PT this afternoon. Desi  improvement in functional mobility, although limited with endurance 2/2 pain in LLE. Will continue to progress as able. Requests to return to supine in bed d/t inc'd back pain today.  -DB     Row Name 07/07/22 1537          Vital Signs    O2 Delivery Pre Treatment room air  -DB     O2 Delivery Intra Treatment room air  -DB     O2 Delivery Post Treatment room air  -DB     Pre Patient Position Supine  -DB     Intra Patient Position Standing  -DB     Post Patient Position Supine  -DB     Row Name 07/07/22 1537          Positioning and Restraints    Pre-Treatment Position in bed  -DB     Post Treatment Position bed  -DB     In Bed supine;call light within reach;encouraged to call for assist;exit alarm on  -DB           User Key  (r) = Recorded By, (t) = Taken By, (c) = Cosigned By    Initials Name Provider Type    Rhonda Adler PT Physical Therapist               Outcome Measures     Row Name 07/07/22 1539          How much help from another person do you currently need...    Turning from your back to your side while in flat bed without using bedrails? 4  -DB     Moving from lying on back to sitting on the side of a flat bed without bedrails? 3  -DB     Moving to and from a bed to a chair (including a wheelchair)? 3  -DB     Standing up from a chair using your arms (e.g., wheelchair, bedside chair)? 4  -DB     Climbing 3-5 steps with a railing? 2  -DB     To walk in hospital room? 4  -DB     AM-PAC 6 Clicks Score (PT) 20  -DB     Highest level of mobility 6 --> Walked 10 steps or more  -DB     Row Name 07/07/22 1539          Functional Assessment    Outcome Measure Options AM-PAC 6 Clicks Basic Mobility (PT)  -DB           User Key  (r) = Recorded By, (t) = Taken By, (c) = Cosigned By    Initials Name Provider Type    Rhonda Adler PT Physical Therapist                             Physical Therapy Education                 Title: PT OT SLP Therapies (Done)     Topic: Physical Therapy (Done)     Point:  Mobility training (Done)     Learning Progress Summary           Patient Acceptance, E, VU by DB at 7/7/2022 1540    Acceptance, E, VU by DB at 7/6/2022 1152    Acceptance, E,D, VU by PH at 7/5/2022 1544    Acceptance, E,D, Bed IU by RS at 7/3/2022 1335    Acceptance, E,TB, VU by  at 7/3/2022 0437    Acceptance, D,E, VU,NR by MS at 7/1/2022 1119    Acceptance, E,D, VU,NR by MS at 6/28/2022 1123                   Point: Home exercise program (Done)     Learning Progress Summary           Patient Acceptance, E, VU by DB at 7/7/2022 1540    Acceptance, E, VU by DB at 7/6/2022 1152    Acceptance, E,D, VU by PH at 7/5/2022 1544                   Point: Body mechanics (Done)     Learning Progress Summary           Patient Acceptance, E, VU by DB at 7/7/2022 1540    Acceptance, E, VU by DB at 7/6/2022 1152    Acceptance, E,D, VU by PH at 7/5/2022 1544    Acceptance, E,D, Bed IU by  at 7/3/2022 1335    Acceptance, E,TB, VU by  at 7/3/2022 0437    Acceptance, D,E, VU,NR by MS at 7/1/2022 1119    Acceptance, E,D, VU,NR by MS at 6/28/2022 1123                   Point: Precautions (Done)     Learning Progress Summary           Patient Acceptance, E, VU by DB at 7/7/2022 1540    Acceptance, E, VU by DB at 7/6/2022 1152    Acceptance, E,D, VU by PH at 7/5/2022 1544    Acceptance, E,TB, VU by  at 7/3/2022 0437    Acceptance, D,E, VU,NR by MS at 7/1/2022 1119    Acceptance, E,D, VU,NR by MS at 6/28/2022 1123                               User Key     Initials Effective Dates Name Provider Type Discipline    MS 06/16/21 -  Hardeep Miller, PT Physical Therapist PT    DB 06/16/21 -  Rhonda Yancey, PT Physical Therapist PT    RS 06/16/21 -  Thais Deutsch, PT Physical Therapist PT    PH 06/16/21 -  Amalia Yap PTA Physical Therapist Assistant PT     06/16/21 -  Emma Hopper RN Registered Nurse Nurse              PT Recommendation and Plan     Plan of Care Reviewed With: patient  Progress:  improving  Outcome Evaluation: Pt agreeable to work with PT this afternoon. Dmitriy's improvement in functional mobility, although limited with endurance 2/2 pain in LLE. Will continue to progress as able. Requests to return to supine in bed d/t inc'd back pain today.     Time Calculation:    PT Charges     Row Name 07/07/22 1541             Time Calculation    Start Time 1433  -DB      Stop Time 1456  -DB      Time Calculation (min) 23 min  -DB      PT Received On 07/07/22  -DB      PT - Next Appointment 07/08/22  -DB              Time Calculation- PT    Total Timed Code Minutes- PT 23 minute(s)  -DB            User Key  (r) = Recorded By, (t) = Taken By, (c) = Cosigned By    Initials Name Provider Type    Rhonda Adler, PT Physical Therapist              Therapy Charges for Today     Code Description Service Date Service Provider Modifiers Qty    35248478257  PT THER PROC EA 15 MIN 7/6/2022 Rhonda Yancey, PT GP 1    [id number]  PT THERAPEUTIC ACT EA 15 MIN 7/6/2022 Rhonda Yancey, PT GP 1    [id number]  PT THERAPEUTIC ACT EA 15 MIN 7/7/2022 BackRhonda campbell, PT GP 1    [id number]  PT THER PROC EA 15 MIN 7/7/2022 Rhonda Yancey, PT GP 1          PT G-Codes  Outcome Measure Options: AM-PAC 6 Clicks Basic Mobility (PT)  AM-PAC 6 Clicks Score (PT): 20  AM-PAC 6 Clicks Score (OT): 10  Modified Susan Scale: 4 - Moderately severe disability.  Unable to walk without assistance, and unable to attend to own bodily needs without assistance.    Rhonda Yancey PT  7/7/2022

## 2022-07-07 NOTE — THERAPY TREATMENT NOTE
Patient Name: Marylu Georges  : 1960    MRN: [id number]                              Today's Date: 2022       Admit Date: 2022    Visit Dx:     ICD-10-CM ICD-9-CM   1. Streptococcal arthritis of left ankle (HCC)  M00.272 711.07     041.00   2. Atrial fibrillation, unspecified type (HCC)  I48.91 427.31   3. Elevated LFTs  R79.89 790.6   4. Elevated C-reactive protein (CRP)  R79.82 790.95   5. Elevated sed rate  R70.0 790.1   6. Edema of both upper extremities  R60.0 782.3     Patient Active Problem List   Diagnosis   • Malignant neoplasm of overlapping sites of left breast in female, estrogen receptor positive (HCC)   • Family history of breast cancer in female   • Syncope   • Malignant neoplasm of overlapping sites of both breasts in female, estrogen receptor positive (HCC)   • Hypertension   • Encounter for long-term (current) use of other medications   • Drug-induced hepatitis   • Atrial fibrillation (HCC)   • Transaminitis   • Lymphedema of upper extremity, bilateral   • Rash   • Inflammatory arthritis   • Streptococcal arthritis of left ankle (HCC)   • New onset atrial fibrillation (HCC)     Past Medical History:   Diagnosis Date   • Anemia in neoplastic disease    • Arthritis    • Breast cancer (HCC)     Left   • CVA (cerebral vascular accident) (HCC)    • Hip pain     RIGHT HIP... CYST   • History of fracture of leg    • History of radiation therapy     LAST TREATMENT     • Hypertension    • Limited joint range of motion (ROM)     RIGHT HIP   • Skin sore     OPEN SORE LEFT BREAST   • Syncope    • Vertigo      Past Surgical History:   Procedure Laterality Date   • AXILLARY LYMPH NODE BIOPSY/EXCISION Right     LYMPH NODE UNDER RIGHT ARM-MALIGNANT (DOUBLE MASTECTOMY)   • BREAST BIOPSY Left     MALIGNANT   • FRACTURE SURGERY      Leg   • HARDWARE REMOVAL     • INCISION AND DRAINAGE LEG Left 2022    Procedure: INCISION AND DRAINAGE left ankle;  Surgeon: Kenneth Tran MD;   Location: [facility] MAIN OR;  Service: Orthopedics;  Laterality: Left;   • MASTECTOMY W/ SENTINEL NODE BIOPSY Bilateral 9/16/2019    Procedure: BILATERLA MODIFIED RADICAL MASTECTOMY WITH BILATERAL SENTINEL LYMPH NODE BIOPSY;  Surgeon: Joby Barron Jr., MD;  Location: [facility] MAIN OR;  Service: General   • TOTAL HIP ARTHROPLASTY Right 3/2/2020    Procedure: RIGHT TOTAL HIP ARTHROPLASTY NATALY NAVIGATION;  Surgeon: Luis M Leonard MD;  Location: [facility] MAIN OR;  Service: Orthopedics;  Laterality: Right;   • TOTAL HIP ARTHROPLASTY Left 7/20/2021    Procedure: Posterior LEFT TOTAL HIP ARTHROPLASTY NATALY NAVIGATION;  Surgeon: Luis M Leonard MD;  Location: [facility] MAIN OR;  Service: Orthopedics;  Laterality: Left;   • VENOUS ACCESS DEVICE (PORT) INSERTION Right 2/1/2019    Procedure: INSERTION VENOUS ACCESS DEVICE;  Surgeon: Joby Barron Jr., MD;  Location: [facility];  Service: General   • VENOUS ACCESS DEVICE (PORT) REMOVAL N/A 10/30/2019    Procedure: Mediport Removal;  Surgeon: Joby Barron Jr., MD;  Location: [facility] OR;  Service: General      General Information     Row Name 07/07/22 0907          OT Time and Intention    Document Type therapy note (daily note)  -RB     Mode of Treatment individual therapy;occupational therapy  -RB     Row Name 07/07/22 0907          General Information    Patient Profile Reviewed yes  -RB     Prior Level of Function independent:;ADL's;transfer  -RB     Existing Precautions/Restrictions fall  -RB     Row Name 07/07/22 0907          Cognition    Orientation Status (Cognition) oriented x 3  -RB           User Key  (r) = Recorded By, (t) = Taken By, (c) = Cosigned By    Initials Name Provider Type    RB Viviane Mauricio OT Occupational Therapist                 Mobility/ADL's     Row Name 07/07/22 0907          Bed Mobility    Bed Mobility supine-sit  -RB     Supine-Sit [place] (Bed Mobility) modified independence  -RB     Row Name 07/07/22 0907           Transfers    Transfers sit-stand transfer;stand-sit transfer;toilet transfer;bed-chair transfer  -RB     Comment, (Transfers) multiple sit to stand transfers to various chairs in the room, x2 transfers to the commode.  -RB     Bed-Chair [place] (Transfers) verbal cues;contact guard  -RB     Assistive Device (Bed-Chair Transfers) walker, front-wheeled  -RB     Sit-Stand [place] (Transfers) verbal cues;contact guard  -RB     Stand-Sit [place] (Transfers) verbal cues;contact guard  -RB     [place] Level (Toilet Transfer) contact guard;verbal cues  -RB     Assistive Device (Toilet Transfer) commode, 3-in-1;grab bars/safety frame  -RB     Row Name 07/07/22 0907          Sit-Stand Transfer    Assistive Device (Sit-Stand Transfers) walker, front-wheeled  -RB     Row Name 07/07/22 0907          Stand-Sit Transfer    Assistive Device (Stand-Sit Transfers) walker, front-wheeled  -RB     Row Name 07/07/22 0907          Toilet Transfer    Type (Toilet Transfer) stand-sit;sit-stand  -RB     Row Name 07/07/22 0907          Functional Mobility    Functional Mobility- Ind. Level contact guard assist;verbal cues required  -RB     Functional Mobility- Device walker, front-wheeled  -RB     Row Name 07/07/22 0907          Activities of Daily Living    BADL Assessment/Intervention grooming;toileting  -RB     Row Name 07/07/22 0907          Toileting Assessment/Training    [place] Level (Toileting) toileting skills;contact guard assist;adjust/manage clothing;perform perineal hygiene  -RB     Position (Toileting) supported sitting;supported standing  -RB     Row Name 07/07/22 0907          Grooming Assessment/Training    [place] Level (Grooming) grooming skills;verbal cues;contact guard assist  -RB     Position (Grooming) sink side;supported sitting;supported standing  -RB     Comment, (Grooming) increased time - from sitting and standing due to elevated HR at sinkside  -RB           User Key  (r) = Recorded  By, (t) = Taken By, (c) = Cosigned By    Initials Name Provider Type    RB Viviane Mauricio OT Occupational Therapist               Obj/Interventions     Row Name 07/07/22 0909          Balance    Comment, Balance CGA for standing balance with a walker  -RB           User Key  (r) = Recorded By, (t) = Taken By, (c) = Cosigned By    Initials Name Provider Type    RB Viviane Mauricio OT Occupational Therapist               Goals/Plan    No documentation.                Clinical Impression     Row Name 07/07/22 0910          Pain Assessment    Pretreatment Pain Rating 3/10  -RB     Posttreatment Pain Rating 6/10  -RB     Pain Location - Side/Orientation Left  -RB     Pain Location generalized  -RB     Pain Location - ankle;back  -RB     Pain Intervention(s) Repositioned  -RB     Row Name 07/07/22 0910          Plan of Care Review    Plan of Care Reviewed With patient  -RB     Progress improving  -RB     Row Name 07/07/22 0910          Therapy Assessment/Plan (OT)    Rehab Potential (OT) good, to achieve stated therapy goals  -RB     Criteria for Skilled Therapeutic Interventions Met (OT) yes;skilled treatment is necessary  -RB     Therapy Frequency (OT) 5 times/wk  -RB     Row Name 07/07/22 0910          Therapy Plan Review/Discharge Plan (OT)    Anticipated Discharge Disposition (OT) inpatient rehabilitation facility  -RB     Row Name 07/07/22 0910          Vital Signs    Pretreatment Heart Rate (beats/min) 118  -RB     Intratreatment Heart Rate (beats/min) 190  -RB     Posttreatment Heart Rate (beats/min) 119  -RB     O2 Delivery Pre Treatment room air  -RB     O2 Delivery Intra Treatment room air  -RB     O2 Delivery Post Treatment room air  -RB     Pre Patient Position Supine  -RB     Intra Patient Position Standing  -RB     Post Patient Position Sitting  -RB     Row Name 07/07/22 0910          Positioning and Restraints    Pre-Treatment Position in bed  -RB     Post Treatment Position chair  -RB     In Chair  notified nsg;reclined;sitting;call light within reach;encouraged to call for assist;exit alarm on;heels elevated  -RB           User Key  (r) = Recorded By, (t) = Taken By, (c) = Cosigned By    Initials Name Provider Type    RB Viviane Mauricio OT Occupational Therapist               Outcome Measures    No documentation.                 Occupational Therapy Education                 Title: PT OT SLP Therapies (Done)     Topic: Occupational Therapy (Done)     Point: ADL training (Done)     Description:   Instruct learner(s) on proper safety adaptation and remediation techniques during self care or transfers.   Instruct in proper use of assistive devices.              Learning Progress Summary           Patient Acceptance, E, VU,NR by VS at 6/28/2022 1446    Comment: OT discussed the POC and the goals with the pt.                   Point: Home exercise program (Done)     Description:   Instruct learner(s) on appropriate technique for monitoring, assisting and/or progressing therapeutic exercises/activities.              Learning Progress Summary           Patient Acceptance, E, VU,NR by VS at 6/28/2022 1446    Comment: OT discussed the POC and the goals with the pt.                   Point: Precautions (Done)     Description:   Instruct learner(s) on prescribed precautions during self-care and functional transfers.              Learning Progress Summary           Patient Acceptance, E, VU,NR by VS at 6/28/2022 1446    Comment: OT discussed the POC and the goals with the pt.                               User Key     Initials Effective Dates Name Provider Type Discipline    VS 06/16/21 -  Trista Shaikh OTR Occupational Therapist OT              OT Recommendation and Plan  Planned Therapy Interventions (OT): transfer/mobility retraining, strengthening exercise, ROM/therapeutic exercise, activity tolerance training, adaptive equipment training, BADL retraining, functional balance retraining, occupation/activity  based interventions, patient/caregiver education/training, edema control/reduction  Therapy Frequency (OT): 5 times/wk  Plan of Care Review  Plan of Care Reviewed With: patient  Progress: improving     Time Calculation:    Time Calculation- OT     Row Name 07/07/22 0907             Time Calculation- OT    OT Start Time 0824  -RB      OT Stop Time 0904  -RB      OT Time Calculation (min) 40 min  -RB      Total Timed Code Minutes- OT 40 minute(s)  -RB      OT Received On 07/07/22  -RB      OT - Next Appointment 07/08/22  -RB              Timed Charges    17740 - OT Self Care/Mgmt Minutes 40  -RB              Total Minutes    Timed Charges Total Minutes 40  -RB       Total Minutes 40  -RB            User Key  (r) = Recorded By, (t) = Taken By, (c) = Cosigned By    Initials Name Provider Type    RB Viviane Mauricio OT Occupational Therapist              Therapy Charges for Today     Code Description Service Date Service Provider Modifiers Qty    23818612900 HC OT SELF CARE/MGMT/TRAIN EA 15 MIN 7/6/2022 Viviane Mauricio OT GO 1    13546685054 HC OT THER PROC EA 15 MIN 7/6/2022 Viviane Mauricio OT GO 1    44490120898 HC OT SELF CARE/MGMT/TRAIN EA 15 MIN 7/7/2022 Viviane Mauricio OT GO 3               Viviane Mauricio OT  7/7/2022

## 2022-07-07 NOTE — PLAN OF CARE
Goal Outcome Evaluation:  Plan of Care Reviewed With: patient        Progress: improving  Outcome Evaluation: Pt agreeable to work with PT this afternoon. Demo's improvement in functional mobility, although limited with endurance 2/2 pain in LLE. Will continue to progress as able. Requests to return to supine in bed d/t inc'd back pain today.

## 2022-07-07 NOTE — DISCHARGE PLACEMENT REQUEST
"Rhonda Davis (62 y.o. Female)             Date of Birth   1960    Social Security Number       Address   [address] [place] [address]    Home Phone   [phone]    MRN   [id number]       Spiritism   Adventism    Marital Status                               Admission Date   6/27/22    Admission Type   Emergency    Admitting Provider   Santhosh Miller MD    Attending Provider   Frank Wadsworth MD    Department, Room/Bed   [address], E454/1       Discharge Date       Discharge Disposition       Discharge Destination                               Attending Provider: Frank Wadsworth MD    Allergies: Benadryl [Diphenhydramine], Erythromycin, Levaquin [Levofloxacin], Penicillins    Isolation: None   Infection: None   Code Status: CPR   Advance Care Planning Activity    Ht: 167.6 cm (66\")   Wt: 70.3 kg (155 lb)    Admission Cmt: None   Principal Problem: 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                 Active Insurance as of 6/27/2022     Primary Coverage     Payor Plan Insurance Group Employer/Plan Group    ANTHEM BLUE CROSS ANTHEM BLUE CROSS BLUE [facility] PPO [id number]     Payor Plan Address Payor Plan Phone Number Payor Plan Fax Number Effective Dates    [address] [phone]  7/15/2009 - None Entered    [place] [id number]       Subscriber Name Subscriber Birth Date Member ID       RHNODA DAVIS 1960 [id number]                 Emergency Contacts      (Rel.) Home Phone Work Phone Mobile Phone    FAMILIA DAVIS (Brother) [phone] -- [phone]    Cruz Landry (Spouse) [phone] -- [phone]            "

## 2022-07-07 NOTE — PLAN OF CARE
Goal Outcome Evaluation:  Plan of Care Reviewed With: patient        Progress: improving  Outcome Evaluation: pt continues to improve. up with PT and OT. showered today with shower chair. awaiting d/c planning. metoprolol increased d/t elevated HR with activity. otherwise VSS. continue to monitor

## 2022-07-07 NOTE — PROGRESS NOTES
Continued Stay Note  [facility]     Patient Name: Marylu Georges  MRN: [id number]  Today's Date: 7/7/2022    Admit Date: 6/27/2022     Discharge Plan     Row Name 07/07/22 0940       Plan    Plan Rehab referrals pending    Patient/Family in Agreement with Plan yes    Plan Comments Per MILLICENT Lott following pt but do not anticipate available bed until next week. CCP updated pt who consents to referrals to [facility] and additional sub-acute options near her home. Referrals placed, CCP to follow for evals. Mirella Gupta LCSW               Discharge Codes    No documentation.               Expected Discharge Date and Time     Expected Discharge Date Expected Discharge Time    Jul 8, 2022             Shannon Gupta LCSW

## 2022-07-08 PROCEDURE — 25010000002 CEFTRIAXONE PER 250 MG: Performed by: HOSPITALIST

## 2022-07-08 RX ORDER — IBUPROFEN 200 MG
200 TABLET ORAL DAILY
Status: DISCONTINUED | OUTPATIENT
Start: 2022-07-08 | End: 2022-07-13 | Stop reason: HOSPADM

## 2022-07-08 RX ADMIN — IBUPROFEN 200 MG: 200 TABLET, FILM COATED ORAL at 20:17

## 2022-07-08 RX ADMIN — HYDROCODONE BITARTRATE AND ACETAMINOPHEN 1 TABLET: 7.5; 325 TABLET ORAL at 20:17

## 2022-07-08 RX ADMIN — HYDROCODONE BITARTRATE AND ACETAMINOPHEN 1 TABLET: 7.5; 325 TABLET ORAL at 08:59

## 2022-07-08 RX ADMIN — FAMOTIDINE 20 MG: 20 TABLET ORAL at 06:30

## 2022-07-08 RX ADMIN — EXEMESTANE 25 MG: 25 TABLET, FILM COATED ORAL at 09:01

## 2022-07-08 RX ADMIN — METOPROLOL TARTRATE 75 MG: 25 TABLET, FILM COATED ORAL at 20:17

## 2022-07-08 RX ADMIN — APIXABAN 5 MG: 5 TABLET, FILM COATED ORAL at 09:00

## 2022-07-08 RX ADMIN — HYDROXYZINE PAMOATE 25 MG: 25 CAPSULE ORAL at 16:41

## 2022-07-08 RX ADMIN — POLYETHYLENE GLYCOL 3350 17 G: 17 POWDER, FOR SOLUTION ORAL at 17:42

## 2022-07-08 RX ADMIN — TIZANIDINE 4 MG: 4 TABLET ORAL at 06:21

## 2022-07-08 RX ADMIN — METOPROLOL TARTRATE 75 MG: 25 TABLET, FILM COATED ORAL at 09:00

## 2022-07-08 RX ADMIN — TIZANIDINE 4 MG: 4 TABLET ORAL at 19:18

## 2022-07-08 RX ADMIN — CLOBETASOL PROPIONATE 1 APPLICATION: 0.5 CREAM TOPICAL at 09:01

## 2022-07-08 RX ADMIN — FAMOTIDINE 20 MG: 20 TABLET ORAL at 16:41

## 2022-07-08 RX ADMIN — CEFTRIAXONE SODIUM 2 G: 2 INJECTION, POWDER, FOR SOLUTION INTRAMUSCULAR; INTRAVENOUS at 12:34

## 2022-07-08 RX ADMIN — HYDROCODONE BITARTRATE AND ACETAMINOPHEN 1 TABLET: 7.5; 325 TABLET ORAL at 04:52

## 2022-07-08 RX ADMIN — LIDOCAINE 1 PATCH: 50 PATCH TOPICAL at 20:17

## 2022-07-08 RX ADMIN — Medication 10 ML: at 09:00

## 2022-07-08 RX ADMIN — HYDROXYZINE PAMOATE 25 MG: 25 CAPSULE ORAL at 09:00

## 2022-07-08 RX ADMIN — TIZANIDINE 4 MG: 4 TABLET ORAL at 12:34

## 2022-07-08 RX ADMIN — Medication 10 ML: at 20:17

## 2022-07-08 RX ADMIN — DIGOXIN 125 MCG: 125 TABLET ORAL at 12:38

## 2022-07-08 RX ADMIN — HYDROXYZINE PAMOATE 25 MG: 25 CAPSULE ORAL at 20:17

## 2022-07-08 RX ADMIN — APIXABAN 5 MG: 5 TABLET, FILM COATED ORAL at 20:17

## 2022-07-08 RX ADMIN — CLOBETASOL PROPIONATE 1 APPLICATION: 0.5 CREAM TOPICAL at 20:17

## 2022-07-08 RX ADMIN — HYDROCODONE BITARTRATE AND ACETAMINOPHEN 1 TABLET: 7.5; 325 TABLET ORAL at 16:40

## 2022-07-08 NOTE — PLAN OF CARE
Goal Outcome Evaluation:         VSS.Pain mgt. effective with PRN.Up to bathroom *1 assist with walker .No acute changes this shift .anticipate discharge soon .WCTM.

## 2022-07-08 NOTE — PROGRESS NOTES
Continued Stay Note  [facility]     Patient Name: Marylu Georges  MRN: [id number]  Today's Date: 7/8/2022    Admit Date: 6/27/2022     Discharge Plan     Row Name 07/08/22 0940       Plan    Plan [facility] pending [place] pre-cert, initiated today    Patient/Family in Agreement with Plan yes    Plan Comments Per Tito Brady to initiate [place] pre-cert today. Packet in CCP office. CCP to follow for pre-cert. Mirella Gupta LCSW               Discharge Codes    No documentation.               Expected Discharge Date and Time     Expected Discharge Date Expected Discharge Time    Jul 8, 2022             Shannon Gupta LCSW

## 2022-07-08 NOTE — PAYOR COMM NOTE
"Destini Rhonda TILLMAN (62 y.o. Female)     U/D FOR [id number]    CONTACT KAYLA NAVARRETE  P# [phone]  F# [phone]                Date of Birth   1960    Social Security Number       Address   [address] [place] [address]    Home Phone   [phone]    MRN   [id number]       Jainism   Restorationism    Marital Status                               Admission Date   6/27/22    Admission Type   Emergency    Admitting Provider   Santhosh Miller MD    Attending Provider   Frank Wadsworth MD    Department, Room/Bed   [facility], E454/1       Discharge Date       Discharge Disposition       Discharge Destination                               Attending Provider: Frank Wadsworth MD    Allergies: Benadryl [Diphenhydramine], Erythromycin, Levaquin [Levofloxacin], Penicillins    Isolation: None   Infection: None   Code Status: CPR   Advance Care Planning Activity    Ht: 167.6 cm (66\")   Wt: 70.3 kg (155 lb)    Admission Cmt: None   Principal Problem: 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                 Active Insurance as of 6/27/2022     Primary Coverage     Payor Plan Insurance Group Employer/Plan Group    [employer]HE[employer] BLUE CROSS AN[employer] BLUE CROSS BLUE SHIELD PPO [id number]     Payor Plan Address Payor Plan Phone Number Payor Plan Fax Number Effective Dates    [address] [phone]  7/15/2009 - None Entered    [place] [address]       Subscriber Name Subscriber Birth Date Member ID       RHONDA DAVIS 1960 [id number]                 Emergency Contacts      (Rel.) Home Phone Work Phone Mobile Phone    FAMILIA DAVIS [facility] (Brother) [phone] -- [phone]    Cruz Landry (Spouse) [phone] -- [phone]            Operative/Procedure Notes (last 7 days)  Notes from 07/01/22 0728 through 07/08/22 0728   No notes of this type exist for this encounter.            Physician Progress Notes (last 72 hours)    "   Mode Voss MD at 22        Patient is seen and examined.    Overall, patient feels her L index finger is about the same although pain and range of motion might be slightly better.    We discussed about further treatment options and decide to continue nonsurgical treatment.     Will continue to follow.    Electronically signed by Mode Voss MD at 22     Frank Wadsworth MD at 22 1244              Name: Marylu Georges ADMIT: 2022   : 1960  PCP: Elie Dixon MD    MRN: [id number] LOS: 8 days   AGE/SEX: 62 y.o. female  ROOM: [facility]   Subjective   Chief Complaint   Patient presents with   • Arm Swelling     Pain is fair  In/out of afib  Rate high earlier but ok now  On ABX    ROS  No f/c  No n/v  No cp/palp  No soa/cough    Objective   Vital Signs  Temp:  [97.5 °F (36.4 °C)-99.3 °F (37.4 °C)] 98.7 °F (37.1 °C)  Heart Rate:  [67-82] 76  Resp:  [17-18] 18  BP: (102-119)/(69-85) 111/85  SpO2:  [86 %-98 %] 96 %  on   ;   Device (Oxygen Therapy): room air  Body mass index is 25.02 kg/m².    Physical Exam  Constitutional:       General: She is not in acute distress.  HENT:      Head: Normocephalic and atraumatic.   Eyes:      General: No scleral icterus.  Cardiovascular:      Rate and Rhythm: Normal rate. Rhythm irregular.      Heart sounds: Normal heart sounds.   Pulmonary:      Effort: Pulmonary effort is normal. No respiratory distress.   Abdominal:      General: There is no distension.      Palpations: Abdomen is soft.   Musculoskeletal:      Cervical back: Neck supple.   Neurological:      Mental Status: She is alert.   Psychiatric:         Behavior: Behavior normal.     inflammatory joint changes to multiple joints    Results Review:       I reviewed the patient's new clinical results.  Results from last 7 days   Lab Units 22  0554 22  0554 22  0611 22  0527   WBC 10*3/mm3 5.45 6.00 6.38 9.15   HEMOGLOBIN g/dL 11.6* 11.3* 11.9* 11.9*    PLATELETS 10*3/mm3 468* 415 359 357     Results from last 7 days   Lab Units 07/06/22  0554 07/05/22  0554 07/04/22 0418 07/03/22  0611 07/02/22  0527   SODIUM mmol/L 134* 135*  --  133* 136   POTASSIUM mmol/L 4.2 4.5 5.0 4.4 4.1   CHLORIDE mmol/L 101 101  --  97* 97*   CO2 mmol/L 25.5 24.0  --  25.0 28.0   BUN mg/dL 16 14  --  15 16   CREATININE mg/dL 0.61 0.55*  --  0.61 0.72   GLUCOSE mg/dL 93 93  --  96 111*   Estimated Creatinine Clearance: 103.4 mL/min (by C-G formula based on SCr of 0.61 mg/dL).  Results from last 7 days   Lab Units 07/06/22 0554 07/05/22  0554 07/03/22 0611 07/02/22  0527   ALBUMIN g/dL 3.10* 3.00* 2.70* 3.00*   BILIRUBIN mg/dL 0.5  --  0.5 0.6   ALK PHOS U/L 105  --  109 124*   AST (SGOT) U/L 37*  --  41* 61*   ALT (SGPT) U/L 44*  --  50* 61*     Results from last 7 days   Lab Units 07/06/22 0554 07/05/22  0554 07/04/22 0418 07/03/22 0611 07/02/22  0527   CALCIUM mg/dL 8.9 8.7  --  8.6 8.9   ALBUMIN g/dL 3.10* 3.00*  --  2.70* 3.00*   MAGNESIUM mg/dL  --   --  2.1  --   --    PHOSPHORUS mg/dL  --  3.9  --   --   --          Coag     HbA1C   Lab Results   Component Value Date    HGBA1C 5.60 07/30/2019     Infection   Results from last 7 days   Lab Units 06/30/22  1250   BODYFLDCX  No growth at 5 days     Radiology(recent) No radiology results for the last day  No results found for: TROPONINT, TROPONINI, BNP  No components found for: TSH;2    apixaban, 5 mg, Oral, Q12H  cefTRIAXone, 2 g, Intravenous, Q24H  clobetasol, 1 application, Topical, Q12H  digoxin, 125 mcg, Oral, Daily  exemestane, 25 mg, Oral, Daily  famotidine, 20 mg, Oral, BID AC  hydrOXYzine pamoate, 25 mg, Oral, TID  ibuprofen, 600 mg, Oral, Daily  lidocaine, 1 patch, Transdermal, Q24H  metoprolol tartrate, 75 mg, Oral, Q12H  sodium chloride, 10 mL, Intravenous, Q12H       Diet Regular      Assessment & Plan     Active Hospital Problems    Diagnosis  POA   • **Streptococcal arthritis of left ankle (HCC) [M00.272]  Yes   •  New onset atrial fibrillation (HCC) [I48.91]  Yes   • Inflammatory arthritis [M19.90]  Yes   • Atrial fibrillation (HCC) [I48.91]  Yes   • Transaminitis [R74.01]  Yes   • Lymphedema of upper extremity, bilateral [I89.0]  Yes   • Rash [R21]  Yes   • Malignant neoplasm of overlapping sites of left breast in female, estrogen receptor positive (HCC) [C50.812, Z17.0]  Not Applicable      Resolved Hospital Problems   No resolved problems to display.       62 year old woman with a history of left sided breast cancer s/p bilateral mastectomy in 2019 and chemo/xrt on aromasin, history of a pericardial mass which resolved with chemotherapy, history of cva, essential hypertension who presented from home with bilateral upper extremity swelling and swelling in her left ankle and a rash on the right arm. She also had new onset Afib with RVR.  Arthrocentesis of the left ankle very surprisingly showed strep c. She is s/p wash out. MRI of the left shoulder showed synovitis. MRI of the lumbar spine showed discitis and myositis.  MRI  does show MCP septic arthritis.               · New onset afib with rvr-tsh was normal. Echocardiogram with patent foramen ovale and severely dilated left atrial cavity. Continue digoxin, and metoprolol. On therapeutic lovenox for AC. Will transition to eliquis 5mg po BID. Increase metoprolol today  · Left ankle septic arthritis with group c strep-s/p I&D on 6/30/22 by Dr. Tran. Ceftriaxone 2 g IV every 24 hours, stop date August 10, 2022. Check weekly CBC with differential, serum creatinine and fax to Dr. Grossman at [phone].  Follow-up with Dr. Grossman in the infectious diseases clinic on August 10, 2022        · Discitis, myositis of the lumbar spine-spine surgery has evaluated. No surgical intervention is recommend at this time.  On ceftriaxone as above.   · Left shoulder synovitis-went for joint aspiration on 6/30/22, but only scant material was able to be obtained. Culture is negative at 2  days.  · Left hand second MCP joint septic arthritis-MRI completed .  Hand surgery had seen and no current surgical plans.   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 Recommended to patient to follow up with Rheumatology as outpatient.   · Urticarial eruption vs. cellulitis of the bilateral upper extremities and chest-greatly improved with topical steroids, antihistamines, and abx.  · Pain related to the above-continue norco, muscle relaxant, lidocaine patches, bowel regimen.    · Elevated liver enzymes-acute hepatitis panel was negative. Liver u/s showed some mild dilation of the pancreatic duct. Lipase was slightly elevated. Her LFTs are trending down and she's not having any abdominal pain/nausea/vomiting, so will hold off on MRCP for the time being unless LFTs worsen again or she develops abdominal symptoms.  · History of left sided breast cancer s/p bilateral mastectomy in 2019, chemo/xrt-aromasin  · History of pericardial mass which resolved with chemotherapy-not seen on TTE this admission  · History of CVA  · Essential hypertension  · pepcid for gi ppx   · Discussed with patient and nursing staff.      · Anticipate discharge possible acute rehab vs subacute rehab    Greater than 36 minutes spent with greater than 50% counseling and coordinating care      DW staff  Reviewed records      Frank Wadsworth MD  [facility]  07/07/22  13:13 EDT    Electronically signed by Frank Wadsworth MD at 07/07/22 1246     Alejandro Meredith MD at 07/07/22 1016              Orthopedic Progress Note      Patient: Marylu Georges    YOB: 1960    Medical Record Number: [id number]    Attending Physician: Frank Wadsworth MD    Date of Admission: 6/27/2022  6:51 AM    Admitting Dx:  Elevated C-reactive protein (CRP) [R79.82]  Elevated sed rate [R70.0]  Elevated LFTs [R79.89]  Edema of both upper extremities [R60.0]  Atrial  fibrillation, unspecified type (HCC) [I48.91]  New onset atrial fibrillation (HCC) [I48.91]    Current Problem List:   Patient Active Problem List   Diagnosis   • Malignant neoplasm of overlapping sites of left breast in female, estrogen receptor positive (HCC)   • Family history of breast cancer in female   • Syncope   • Malignant neoplasm of overlapping sites of both breasts in female, estrogen receptor positive (HCC)   • Hypertension   • Encounter for long-term (current) use of other medications   • Drug-induced hepatitis   • Atrial fibrillation (HCC)   • Transaminitis   • Lymphedema of upper extremity, bilateral   • Rash   • Inflammatory arthritis   • Streptococcal arthritis of left ankle (HCC)   • New onset atrial fibrillation (HCC)         Past Medical History:   Diagnosis Date   • Anemia in neoplastic disease    • Arthritis    • Breast cancer (HCC)     Left   • CVA (cerebral vascular accident) (HCC)    • Hip pain     RIGHT HIP... CYST   • History of fracture of leg 1987   • History of radiation therapy     LAST TREATMENT     • Hypertension    • Limited joint range of motion (ROM)     RIGHT HIP   • Skin sore     OPEN SORE LEFT BREAST   • Syncope    • Vertigo        Current Medications:  Scheduled Meds:apixaban, 5 mg, Oral, Q12H  cefTRIAXone, 2 g, Intravenous, Q24H  clobetasol, 1 application, Topical, Q12H  digoxin, 125 mcg, Oral, Daily  exemestane, 25 mg, Oral, Daily  famotidine, 20 mg, Oral, BID AC  hydrOXYzine pamoate, 25 mg, Oral, TID  ibuprofen, 600 mg, Oral, Daily  lidocaine, 1 patch, Transdermal, Q24H  metoprolol tartrate, 25 mg, Oral, Once  metoprolol tartrate, 75 mg, Oral, Q12H  sodium chloride, 10 mL, Intravenous, Q12H      PRN Meds:.senna-docusate sodium **AND** polyethylene glycol **AND** bisacodyl **AND** bisacodyl  •  HYDROcodone-acetaminophen  •  nitroglycerin  •  ondansetron **OR** ondansetron  •  simethicone  •  [COMPLETED] Insert peripheral IV **AND** sodium chloride  •  sodium  chloride  •  tiZANidine  •  traZODone    SUBJECTIVE: 62 y.o.  female    OBJECTIVE:   Vitals:    07/07/22 0144 07/07/22 0333 07/07/22 0544 07/07/22 0739   BP:    111/85   BP Location:    Right arm   Patient Position:    Sitting   Pulse: 73 67 68 76   Resp:    18   Temp:    98.7 °F (37.1 °C)   TempSrc:    Oral   SpO2: 97% 96% 98% 96%   Weight:       Height:         I/O last 3 completed shifts:  In: 238 [P.O.:238]  Out: 1550 [Urine:1550]    Diagnostic Tests:   Lab Results (last 24 hours)     ** No results found for the last 24 hours. **          PHYSICAL EXAM: Patient is presently sitting up in the chair and is feeling some better.  Continues to complain of back pain which is worse with any kind of movement but it does settle down after taking her medication.  Most of her pain is primarily back she denies any leg pain.  Left anterior ankle incision is intact dressing remains dry.  She is beginning to work on some range of motion with her ankle.  Swelling to her left foot has improved since removing the Ace bandage.  Patient is progressing with PT and is feeling better.  Remains on IV antibiotics.     ASSESSMENT & PLAN:    Patient has been approved for acute rehab however will need a preauthorization from her insurance.  Unfortunately acute rehab does not have any beds available until next week therefore [facility] is working on possible subacute placement.  Patient is also talking about possibly going home with home health since she is progressing with her ambulation.  Recommended that she increase her mobilization as she can tolerate with both PT and staff.  Will also fit her with a warm-and-form lumbar corset to wear when she is out of bed for support    Patient is return to the office in about a month to see Dr. Meredith for follow-up on her back.  Sooner if need be    Date: 7/7/2022    Maria Isabel Tam RN                Electronically signed by Alejandro Meredith MD at 07/08/22 0659     Juan Grossman MD at  22 0947        ID note for cellulitis?  S:  She continues to feel better day by day.  No fevers or chills or night sweats.  Tolerating antibiotics.  Left hand continues to improve    O: Temp:  [97.5 °F (36.4 °C)-99.3 °F (37.4 °C)] 98.7 °F (37.1 °C)  Heart Rate:  [67-82] 76  Resp:  [17-18] 18  BP: (102-119)/(69-85) 111/85  GENERAL: nad  HEENT: Oropharynx is clear. Hearing is grossly normal.    left shoulder full ROM, l second mcp joint swollen       Blood cultures no growth to date   left ankle arthrocentesis culture Group C strep (red blood cells 200,000, white blood cells 57,400, negative crystals)   left shoulder aspiration cultures no growth    A/p  1.  Septic arthritis L ankle d/t Group C strep, s/p I and D on   2.  Left shoulder septic arthritis  3.  Lumbar discitis  4.  Left hand septic arthritis  5.  Aortic valve calcification with suspected endocarditis, no clear vegetation and in discussions with cardiology holding on KENNETH as will receive medical therapy for endocarditis by treatment of the discitis    PICC line ordered.  6 weeks of IV Rocephin as below.  No objection to discharge when okay with others.  We will see as needed.    Final infectious disease recommendations  1.  Ceftriaxone 2 g IV every 24 hours, stop date August 10, 2022  2.  Check weekly CBC with differential, serum creatinine and fax to me at [phone]  3.  Follow-up with me in the infectious diseases clinic on August 10, 2022    Electronically signed by Juan Grossman MD at 22 0948     Farnk Wadsworth MD at 22 1313              Name: Marylu Georges ADMIT: 2022   : 1960  PCP: Elie Dixon MD    MRN: [id number] LOS: 7 days   AGE/SEX: 62 y.o. female  ROOM: [facility]   Subjective   Chief Complaint   Patient presents with   • Arm Swelling     Pain is fair  In/out of afib  Rate ok  On ABX    ROS  No f/c  No n/v  No cp/palp  No soa/cough    Objective   Vital Signs  Temp:  [98.5 °F  (36.9 °C)-99.1 °F (37.3 °C)] 98.7 °F (37.1 °C)  Heart Rate:  [66-84] 66  Resp:  [16-18] 16  BP: (108-126)/(73-94) 114/73  SpO2:  [98 %-99 %] 98 %  on   ;   Device (Oxygen Therapy): room air  Body mass index is 25.02 kg/m².    Physical Exam  Constitutional:       General: She is not in acute distress.  HENT:      Head: Normocephalic and atraumatic.   Eyes:      General: No scleral icterus.  Cardiovascular:      Rate and Rhythm: Normal rate. Rhythm irregular.      Heart sounds: Normal heart sounds.   Pulmonary:      Effort: Pulmonary effort is normal. No respiratory distress.   Abdominal:      General: There is no distension.      Palpations: Abdomen is soft.   Musculoskeletal:      Cervical back: Neck supple.   Neurological:      Mental Status: She is alert.   Psychiatric:         Behavior: Behavior normal.         Results Review:       I reviewed the patient's new clinical results.  Results from last 7 days   Lab Units 07/06/22 0554 07/05/22 0554 07/03/22 0611 07/02/22 0527   WBC 10*3/mm3 5.45 6.00 6.38 9.15   HEMOGLOBIN g/dL 11.6* 11.3* 11.9* 11.9*   PLATELETS 10*3/mm3 468* 415 359 357     Results from last 7 days   Lab Units 07/06/22 0554 07/05/22 0554 07/04/22  0418 07/03/22 0611 07/02/22  0527   SODIUM mmol/L 134* 135*  --  133* 136   POTASSIUM mmol/L 4.2 4.5 5.0 4.4 4.1   CHLORIDE mmol/L 101 101  --  97* 97*   CO2 mmol/L 25.5 24.0  --  25.0 28.0   BUN mg/dL 16 14  --  15 16   CREATININE mg/dL 0.61 0.55*  --  0.61 0.72   GLUCOSE mg/dL 93 93  --  96 111*   Estimated Creatinine Clearance: 103.4 mL/min (by C-G formula based on SCr of 0.61 mg/dL).  Results from last 7 days   Lab Units 07/06/22 0554 07/05/22 0554 07/03/22 0611 07/02/22  0527 06/30/22  0414   ALBUMIN g/dL 3.10* 3.00* 2.70* 3.00* 2.90*   BILIRUBIN mg/dL 0.5  --  0.5 0.6 0.3   ALK PHOS U/L 105  --  109 124* 139*   AST (SGOT) U/L 37*  --  41* 61* 105*   ALT (SGPT) U/L 44*  --  50* 61* 102*     Results from last 7 days   Lab Units 07/06/22  0554  07/05/22  0554 07/04/22  0418 07/03/22  0611 07/02/22  0527   CALCIUM mg/dL 8.9 8.7  --  8.6 8.9   ALBUMIN g/dL 3.10* 3.00*  --  2.70* 3.00*   MAGNESIUM mg/dL  --   --  2.1  --   --    PHOSPHORUS mg/dL  --  3.9  --   --   --          Coag     HbA1C   Lab Results   Component Value Date    HGBA1C 5.60 07/30/2019     Infection   Results from last 7 days   Lab Units 06/30/22  1250   BODYFLDCX  No growth at 5 days     Radiology(recent) MRI Hand Left With & Without Contrast    Result Date: 7/4/2022  1. Suspected septic arthritis at the 2nd MCP joint where there is joint space narrowing with joint effusion and surrounding synovitis and soft tissue inflammation. Mild marrow edema within the radial aspects of the 2nd metacarpal head and base of the 2nd proximal phalanx suspicious for early osteomyelitis though no cortical loss is demonstrated. 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 There are also advanced arthritic changes at the 1st CMC joint.  This report was finalized on 7/4/2022 4:38 PM by Dr. Andrae Rivas M.D.      No results found for: TROPONINT, TROPONINI, BNP  No components found for: TSH;2    cefTRIAXone, 2 g, Intravenous, Q24H  clobetasol, 1 application, Topical, Q12H  digoxin, 125 mcg, Oral, Daily  enoxaparin, 1 mg/kg, Subcutaneous, Q12H  exemestane, 25 mg, Oral, Daily  famotidine, 20 mg, Oral, BID AC  hydrOXYzine pamoate, 25 mg, Oral, TID  ibuprofen, 600 mg, Oral, Daily  lidocaine, 1 patch, Transdermal, Q24H  metoprolol tartrate, 50 mg, Oral, Q12H  sodium chloride, 10 mL, Intravenous, Q12H       Diet Regular      Assessment & Plan     Active Hospital Problems    Diagnosis  POA   • **Atrial fibrillation (HCC) [I48.91]  Yes   • New onset atrial fibrillation (HCC) [I48.91]  Yes   • Inflammatory arthritis [M19.90]  Yes   • Transaminitis [R74.01]  Yes   • Lymphedema of upper extremity, bilateral [I89.0]  Yes   • Rash [R21]  Yes   • Streptococcal arthritis of  left ankle (HCC) [M00.272]  Unknown   • Malignant neoplasm of overlapping sites of left breast in female, estrogen receptor positive (HCC) [C50.812, Z17.0]  Not Applicable      Resolved Hospital Problems   No resolved problems to display.       62 year old woman with a history of left sided breast cancer s/p bilateral mastectomy in 2019 and chemo/xrt on aromasin, history of a pericardial mass which resolved with chemotherapy, history of cva, essential hypertension who presented from home with bilateral upper extremity swelling and swelling in her left ankle and a rash on the right arm. She also had new onset Afib with RVR.  Arthrocentesis of the left ankle very surprisingly showed strep c. She is s/p wash out. MRI of the left shoulder showed synovitis. MRI of the lumbar spine showed discitis and myositis.  MRI  does show MCP septic arthritis.               · New onset afib with rvr-tsh was normal. Echocardiogram with patent foramen ovale and severely dilated left atrial cavity. Continue digoxin, and metoprolol. On therapeutic lovenox for AC. Will transition to eliquis 5mg po BID  · Left ankle septic arthritis with group c strep-s/p I&D on 6/30/22 by Dr. Tran. Ceftriaxone 2 g IV every 24 hours, stop date August 10, 2022. Check weekly CBC with differential, serum creatinine and fax to Dr. Grossman at [phone].  Follow-up with Dr. Grossman in the infectious diseases clinic on August 10, 2022        · Discitis, myositis of the lumbar spine-spine surgery has evaluated. No surgical intervention is recommend at this time.  On ceftriaxone as above.   · Left shoulder synovitis-went for joint aspiration on 6/30/22, but only scant material was able to be obtained. Culture is negative at 2 days.  · Left hand second MCP joint septic arthritis-MRI completed .  Hand surgery had seen and no current surgical plans.   · Polyarticular arthritis-anti ccp negative, RF positive, barbara, lupus, and Sjogren's abs all  negative  · Urticarial eruption vs. cellulitis of the bilateral upper extremities and chest-greatly improved with topical steroids, antihistamines, and abx.  · Pain related to the above-continue norco, muscle relaxant, lidocaine patches, bowel regimen.    · Elevated liver enzymes-acute hepatitis panel was negative. Liver u/s showed some mild dilation of the pancreatic duct. Lipase was slightly elevated. Her LFTs are trending down and she's not having any abdominal pain/nausea/vomiting, so will hold off on MRCP for the time being unless LFTs worsen again or she develops abdominal symptoms.  · History of left sided breast cancer s/p bilateral mastectomy in 2019, chemo/xrt-aromasin  · History of pericardial mass which resolved with chemotherapy-not seen on TTE this admission  · History of CVA  · Essential hypertension  · pepcid for gi ppx   · Discussed with patient and nursing staff.      · Anticipate discharge possible acute rehab- having evaluation today        DW staff  Reviewed records      Frank Wadsworth MD  [facility]  07/06/22  13:13 EDT    Electronically signed by Frank Wadsworth MD at 07/06/22 1320     Juan Grossman MD at 07/06/22 0905        ID note for cellulitis?  S: In regards to her shoulder septic arthritis, she says this is improved.  In regards to her discitis, she also thinks this is improved and basically pain-free as long as she is staying on top of the analgesia.  In regards to the left ankle, septic arthritis, she had some pain with bearing weight yesterday but overall improved.  In regards to the left hand septic arthritis, she was evaluated by hand surgery yesterday.  Reviewed their notes and recommended medical management.  She says this is feeling better.    ROS:  Tolerating abx: no no n/v/d, rash  No f/c/ns    O: Temp:  [98.5 °F (36.9 °C)-99.1 °F (37.3 °C)] 98.7 °F (37.1 °C)  Heart Rate:  [66-84] 66  Resp:  [16-18] 16  BP: (108-126)/(73-94)  114/73  GENERAL: nad  HEENT: Oropharynx is clear. Hearing is grossly normal.    left shoulder full ROM, l second mcp joint swollen      White count 5.45, hemoglobin 11.6, platelets 468  Creatinine 0.61    6/28 Blood cultures no growth to date  6/28 left ankle arthrocentesis culture Group C strep (red blood cells 200,000, white blood cells 57,400, negative crystals)  6/30 left shoulder aspiration cultures no growth    A/p  1.  Septic arthritis L ankle d/t Group C strep, s/p I and D on 6/30  2.  Left shoulder septic arthritis  3.  Lumbar discitis  4.  Left hand septic arthritis  5.  Aortic valve calcification with suspected endocarditis, no clear vegetation and in discussions with cardiology holding on KENNETH as will receive medical therapy for endocarditis by treatment of the discitis    Appreciate help from orthopedic surgery, spine surgery and hand surgery.  Discussed with Dr. Johns yesterday and holding on KENNETH at this time  Plan is for 6 weeks of Rocephin as below  PICC ordered and no objection to discharge when okay with others.    Final infectious disease recommendations  1.  Ceftriaxone 2 g IV every 24 hours, stop date August 10, 2022  2.  Check weekly CBC with differential, serum creatinine and fax to me at [phone]  3.  Follow-up with me in the infectious diseases clinic on August 10, 2022    Electronically signed by Juan Grossman MD at 07/06/22 0909     Alejandro Meredith MD at 07/05/22 1213              Orthopedic Progress Note      Patient: Marylu Georges    YOB: 1960    Medical Record Number: [id number]    Attending Physician: Luis M Shaw MD    Date of Admission: 6/27/2022  6:51 AM    Admitting Dx:  Elevated C-reactive protein (CRP) [R79.82]  Elevated sed rate [R70.0]  Elevated LFTs [R79.89]  Edema of both upper extremities [R60.0]  Atrial fibrillation, unspecified type (HCC) [I48.91]  New onset atrial fibrillation (HCC) [I48.91]      Current Problem List:   Patient  Active Problem List   Diagnosis   • Malignant neoplasm of overlapping sites of left breast in female, estrogen receptor positive (HCC)   • Family history of breast cancer in female   • Syncope   • Malignant neoplasm of overlapping sites of both breasts in female, estrogen receptor positive (HCC)   • Hypertension   • Encounter for long-term (current) use of other medications   • Drug-induced hepatitis   • Atrial fibrillation (HCC)   • Transaminitis   • Lymphedema of upper extremity, bilateral   • Rash   • Inflammatory arthritis   • Streptococcal arthritis of left ankle (HCC)   • New onset atrial fibrillation (HCC)         Past Medical History:   Diagnosis Date   • Anemia in neoplastic disease    • Arthritis    • Breast cancer (HCC)     Left   • CVA (cerebral vascular accident) (HCC)    • Hip pain     RIGHT HIP... CYST   • History of fracture of leg 1987   • History of radiation therapy     LAST TREATMENT     • Hypertension    • Limited joint range of motion (ROM)     RIGHT HIP   • Skin sore     OPEN SORE LEFT BREAST   • Syncope    • Vertigo        Current Medications:  Scheduled Meds:cefTRIAXone, 2 g, Intravenous, Q24H  clobetasol, 1 application, Topical, Q12H  digoxin, 125 mcg, Oral, Daily  enoxaparin, 1 mg/kg, Subcutaneous, Q12H  exemestane, 25 mg, Oral, Daily  famotidine, 20 mg, Oral, BID AC  hydrOXYzine pamoate, 25 mg, Oral, TID  ibuprofen, 600 mg, Oral, Daily  lidocaine, 1 patch, Transdermal, Q24H  metoprolol tartrate, 50 mg, Oral, Q12H  sodium chloride, 10 mL, Intravenous, Q12H      PRN Meds:.•  senna-docusate sodium **AND** polyethylene glycol **AND** bisacodyl **AND** bisacodyl  •  HYDROcodone-acetaminophen  •  nitroglycerin  •  ondansetron **OR** ondansetron  •  simethicone  •  [COMPLETED] Insert peripheral IV **AND** sodium chloride  •  sodium chloride  •  tiZANidine  •  traZODone    SUBJECTIVE: 62 y.o.  female awake and alert.  Complaining of left hand pain.  OBJECTIVE:   Vitals:    07/04/22 1301  07/04/22 1952 07/04/22 2337 07/05/22 0801   BP: 116/56 118/81 96/69 133/66   BP Location: Right arm Right arm Right arm Right arm   Patient Position: Lying Lying Lying Lying   Pulse: 77 80 63 80   Resp: 16 16 18 18   Temp: 98.4 °F (36.9 °C) 98.9 °F (37.2 °C) 98.1 °F (36.7 °C) 98.4 °F (36.9 °C)   TempSrc: Oral Oral Oral Oral   SpO2: 98% 98% 92% 96%   Weight:       Height:         I/O last 3 completed shifts:  In: 300 [P.O.:300]  Out: 1500 [Urine:1500]    Diagnostic Tests:   Lab Results (last 24 hours)       Procedure Component Value Units Date/Time    Anaerobic Culture - Aspirate, Shoulder, Left [[id number]] Collected: 06/30/22 1250    Specimen: Aspirate from Shoulder, Left Updated: 07/05/22 0958     Anaerobic Culture No anaerobes isolated at 5 days    Body Fluid Culture - Aspirate, Shoulder, Left [[id number]] Collected: 06/30/22 1250    Specimen: Aspirate from Shoulder, Left Updated: 07/05/22 0840     Body Fluid Culture No growth at 5 days     Gram Stain No WBCs or organisms seen    Renal Function Panel [[id number]]  (Abnormal) Collected: 07/05/22 0554    Specimen: Blood Updated: 07/05/22 0702     Glucose 93 mg/dL      BUN 14 mg/dL      Creatinine 0.55 mg/dL      Sodium 135 mmol/L      Potassium 4.5 mmol/L      Chloride 101 mmol/L      CO2 24.0 mmol/L      Calcium 8.7 mg/dL      Albumin 3.00 g/dL      Phosphorus 3.9 mg/dL      Anion Gap 10.0 mmol/L      BUN/Creatinine Ratio 25.5     eGFR 102.5 mL/min/1.73      Comment: National Kidney Foundation and American Society of Nephrology (ASN) Task Force recommended calculation based on the Chronic Kidney Disease Epidemiology Collaboration (CKD-EPI) equation refit without adjustment for race.       Narrative:      GFR Normal >60  Chronic Kidney Disease <60  Kidney Failure <15      CBC & Differential [[id number]]  (Abnormal) Collected: 07/05/22 0554    Specimen: Blood Updated: 07/05/22 0633    Narrative:      The following orders were created for panel order CBC &  "Differential.  Procedure                               Abnormality         Status                     ---------                               -----------         ------                     CBC Auto Differential[[id number]]        Abnormal            Final result                 Please view results for these tests on the individual orders.    CBC Auto Differential [[id number]]  (Abnormal) Collected: 07/05/22 0554    Specimen: Blood Updated: 07/05/22 0633     WBC 6.00 10*3/mm3      RBC 3.72 10*6/mm3      Hemoglobin 11.3 g/dL      Hematocrit 34.6 %      MCV 93.0 fL      MCH 30.4 pg      MCHC 32.7 g/dL      RDW 13.0 %      RDW-SD 43.9 fl      MPV 8.9 fL      Platelets 415 10*3/mm3      Neutrophil % 73.5 %      Lymphocyte % 17.2 %      Monocyte % 7.0 %      Eosinophil % 0.8 %      Basophil % 0.8 %      Immature Grans % 0.7 %      Neutrophils, Absolute 4.41 10*3/mm3      Lymphocytes, Absolute 1.03 10*3/mm3      Monocytes, Absolute 0.42 10*3/mm3      Eosinophils, Absolute 0.05 10*3/mm3      Basophils, Absolute 0.05 10*3/mm3      Immature Grans, Absolute 0.04 10*3/mm3      nRBC 0.0 /100 WBC             PHYSICAL EXAM: Patient states that she continues to have back pain.  Is worse when she tries to move.  She also has some intermittent \"sciatica\" to both legs but not on a constant basis.  Strength is equal bilaterally.  She has good quad strength.  Patient is anxious to get up and move around more.  Does have some swelling in her left hand and is awaiting for hand surgery to see their recommendations.  Her left ankle incision is intact without any erythema.  She does have some swelling to the dorsum of her foot but otherwise is neurovascularly intact.    ASSESSMENT & PLAN:  Continue IV antibiotics as recommended by ID.  Will continue to follow.  Would not recommend surgical intervention for her discitis due to multilevel involvement.  Patient does appear comfortable in bed at present.  Does get good relief of her pain with " "medication.  Will continue to follow.      Date: 2022    Maria Isabel Tam RN                Electronically signed by Alejandro Meredith MD at 22 1311     Caitlyn Johns MD at 22 1105          Patient Name: Marylu Georges  :1960  62 y.o.      Patient Care Team:  Elie Dixon MD as PCP - General (Hematology and Oncology)  Joby Barron Jr., MD as Referring Physician (General Surgery)  Elie Dixon MD as Consulting Physician (Hematology and Oncology)  Pedro Marmolejo MD as Consulting Physician (Radiation Oncology)    Interval History:   She reports left hand pain.  Waiting for Dr. Voss to see her.    Subjective:  Following for A. fib    Objective   Vital Signs  Temp:  [98.1 °F (36.7 °C)-98.9 °F (37.2 °C)] 98.4 °F (36.9 °C)  Heart Rate:  [63-91] 80  Resp:  [16-18] 18  BP: ()/(56-81) 133/66    Intake/Output Summary (Last 24 hours) at 2022 1105  Last data filed at 2022 1000  Gross per 24 hour   Intake 180 ml   Output 1100 ml   Net -920 ml     Flowsheet Rows    Flowsheet Row First Filed Value   Admission Height 167 cm (65.75\") Documented at 2022 0734   Admission Weight 70.5 kg (155 lb 5 oz) Documented at 2022 0734          Physical Exam:   General Appearance:    Alert, cooperative, in no acute distress   Lungs:     Clear to auscultation.  Normal respiratory effort and rate.      Heart:   Irregularly irregular.  Rate is controlled.   Chest Wall:    No abnormalities observed   Abdomen:     Soft, nontender, positive bowel sounds.     Extremities:   no cyanosis, clubbing or edema.  No marked joint deformities.  Adequate musculoskeletal strength.       Results Review:    Results from last 7 days   Lab Units 22  0554   SODIUM mmol/L 135*   POTASSIUM mmol/L 4.5   CHLORIDE mmol/L 101   CO2 mmol/L 24.0   BUN mg/dL 14   CREATININE mg/dL 0.55*   GLUCOSE mg/dL 93   CALCIUM mg/dL 8.7         Results from last 7 days   Lab Units 22  0554   WBC 10*3/mm3 6.00 "   HEMOGLOBIN g/dL 11.3*   HEMATOCRIT % 34.6   PLATELETS 10*3/mm3 415             Results from last 7 days   Lab Units 07/04/22  0418   MAGNESIUM mg/dL 2.1             Medication Review:   cefTRIAXone, 2 g, Intravenous, Q24H  clobetasol, 1 application, Topical, Q12H  digoxin, 250 mcg, Oral, Daily  enoxaparin, 1 mg/kg, Subcutaneous, Q12H  exemestane, 25 mg, Oral, Daily  famotidine, 20 mg, Oral, BID AC  hydrOXYzine pamoate, 25 mg, Oral, TID  ibuprofen, 600 mg, Oral, Daily  lidocaine, 1 patch, Transdermal, Q24H  metoprolol tartrate, 50 mg, Oral, Q12H  sodium chloride, 10 mL, Intravenous, Q12H              Assessment & Plan     1.  New onset of atrial fibrillation.  Her rate is controlled.  I am going to decrease digoxin to 150 mcg a day.  She is on Lovenox.  This has been held for possible hand surgery later today.  Start Eliquis 5 mg twice daily after her surgical issues have been resolved.  2.  Diastolic heart failure with preserved left ventricular ejection fraction.  She looks euvolemic on exam today.  3.  PFO  4.  Anemia  5.  Hypoalbuminemia  6.  Hyponatremia.    Agree with treating her infection and starting anticoagulation when she can tolerated and is past her surgical issues.  No indication for a transesophageal echocardiogram at this time.    Caitlyn Johns MD, [facility]  07/05/22  11:05 EDT          Electronically signed by Caitlyn Johns MD at 07/05/22 1155     Kenneth Tran MD at 07/05/22 1023          Orthopedic Progress Note      Patient: Marylu Georges  Date of Admission: 6/27/2022  YOB: 1960  Medical Record Number: [id number]    POD # :  5 Days Post-Op Procedure(s) (LRB):  INCISION AND DRAINAGE left ankle (Left)    Patient seen exam this morning.  Resting well.  Reports overall she feels like she is improved heart function doing well when she was weightbearing some more.  Regards to her ankle she feels its little stiff but pain is much improved some  discomfort and pain at the plantar aspect of the foot when she is weightbearing.  She is moving her upper extremities well.  Does continue with some left hand swelling and limited motion but overall pain is controlled.  Most of her discomfort is still her back when she is having spasms.  She states when she gets her medicines on timely basis she does just fine.    Physical Exam:  62 y.o.  female  Vitals:  Temp:  [98.1 °F (36.7 °C)-98.9 °F (37.2 °C)] 98.4 °F (36.9 °C)  Heart Rate:  [63-91] 80  Resp:  [16-18] 18  BP: ()/(56-81) 133/66  alert and oriented    Bilateral upper extremities patient is moving well no overt tenderness to palpation moving elbow and shoulders and wrist well.    Left hand still demonstrates some swelling along the second and third MCPs about the index and middle fingers.  Patient has difficulty flexing much of the MCPs due to swelling still able to flex some of the PIP and DIPs.  Sensation tact distally.  No overt wrist pain or swelling noted.    Left lower extremity examined dressings taken down about the ankle incisions healing well no signs symptoms of drainage or infection still some generalized swelling although seems to be dependent in nature.  No overt tenderness palpation on the ankle joint.  Ankle still slightly stiff encouraged motion she is able to wiggle her toes and has sensation tact distally.  Compartments soft compressible.  2+ pedal pulses    Data Review     No results displayed because visit has over 200 results.          No results found.    Medications:  cefTRIAXone, 2 g, Intravenous, Q24H  clobetasol, 1 application, Topical, Q12H  digoxin, 250 mcg, Oral, Daily  enoxaparin, 1 mg/kg, Subcutaneous, Q12H  exemestane, 25 mg, Oral, Daily  famotidine, 20 mg, Oral, BID AC  hydrOXYzine pamoate, 25 mg, Oral, TID  ibuprofen, 600 mg, Oral, Daily  lidocaine, 1 patch, Transdermal, Q24H  metoprolol tartrate, 50 mg, Oral, Q12H  sodium chloride, 10 mL, Intravenous, Q12H      •   senna-docusate sodium **AND** polyethylene glycol **AND** bisacodyl **AND** bisacodyl  •  HYDROcodone-acetaminophen  •  nitroglycerin  •  ondansetron **OR** ondansetron  •  simethicone  •  [COMPLETED] Insert peripheral IV **AND** sodium chloride  •  sodium chloride  •  tiZANidine  •  traZODone      Imaging: MRI LEFT HAND WITH AND WITHOUT CONTRAST     HISTORY: 62-year-old female with streptococcal arthritis of the left  ankle. Disc space infection in lumbar spine. Left hand joint swelling.     TECHNIQUE: MRI left hand includes axial T1, T2, T1 fat-sat as well as  coronal T1, STIR and sagittal PD weighted sequences. Gadolinium was  administered intravenously followed by axial and coronal T1  fat-saturated sequences.     COMPARISON: X-rays of the left hand 06/27/2022.     FINDINGS: There is abnormal capsular/synovial thickening and enhancement  at the 2nd MCP joint. There is also periarticular soft tissue edema and  enhancement, and this combination of findings is consistent with septic  arthritis of the 2nd MCP joint. There is capsular distention extending  dorsal and medial to the joint where there is peripheral enhancing fluid  that extends 11 mm in AP dimension by 9 mm transverse extending medial  to the 2nd extensor tendon. Small pockets of fluid are present palmar to  the neck of the 2nd metacarpal. Second MCP joint effusion is present and  there is joint space narrowing. There is also mild enhancement within  the lateral aspect of the base of the 2nd proximal phalanx and 2nd  metacarpal head suspicious for surrounding osteomyelitis. Edema is  present within the subcutaneous fat dorsal to the metacarpal.     There is nonuniform joint space narrowing at the interphalangeal joints  with a pattern of central erosions that appears greatest at the 3rd and  5th DIP joints and this is consistent with erosive osteoarthritis. Mild  joint space narrowing is present in the 3rd MCP joint which is an  atypical location for  osteoarthritis and most likely associated with  CPPD arthropathy. X-rays demonstrate chronic calcinosis overlying the  triangular fibrocartilage. There are also advanced arthritic changes at  the 1st CMC joint where there is joint space loss.     IMPRESSION:  1. Suspected septic arthritis at the 2nd MCP joint where there is joint  space narrowing with joint effusion and surrounding synovitis and soft  tissue inflammation. Mild marrow edema within the radial aspects of the  2nd metacarpal head and base of the 2nd proximal phalanx suspicious for  early osteomyelitis though no cortical loss is demonstrated.   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 There are also advanced arthritic  changes at the 1st CMC joint.     This report was finalized on 7/4/2022 4:38 PM by Dr. Andrae Rivas M.D.           Assessment:  Doing well POD  # 5 Days Post-Op Procedure(s) (LRB):  INCISION AND DRAINAGE left ankle (Left)  Problems Addressed this Visit        Cardiac and Vasculature    * (Principal) Atrial fibrillation (HCC)       Other    Streptococcal arthritis of left ankle (HCC) - Primary    Relevant Orders    Case Request (Completed)      Other Visit Diagnoses     Elevated LFTs        Elevated C-reactive protein (CRP)        Elevated sed rate        Edema of both upper extremities          Diagnoses       Codes Comments    Streptococcal arthritis of left ankle (HCC)    -  Primary ICD-10-CM: M00.272  ICD-9-CM: 711.07, 041.00     Atrial fibrillation, unspecified type (HCC)     ICD-10-CM: I48.91  ICD-9-CM: 427.31     Elevated LFTs     ICD-10-CM: R79.89  ICD-9-CM: 790.6     Elevated C-reactive protein (CRP)     ICD-10-CM: R79.82  ICD-9-CM: 790.95     Elevated sed rate     ICD-10-CM: R70.0  ICD-9-CM: 790.1     Edema of both upper extremities     ICD-10-CM: R60.0  ICD-9-CM: 782.3           Plan:    Regards the patient's hand given the MRI report and potential complexity in this  area I talked to Dr. Bipin Lazar and made a consult request for him.  He states he be happy to see the patient some himself or one of the numbers from his group we will plan to see the patient later this morning or early afternoon.  They requested she remain n.p.o. at this time.    Regards her ankle she is healing well.  Encourage continue motion and elevation to work on swelling and function.    Upper extremities except for the hand as noted above seem to be much improved she is moving elbow and shoulders around well with no pain or discomfort.    Spine surgery had seen and examined the patient and recommendations to continue conservative management this time.      Antibiotics per infectious disease.    Physical therapy for mobilization.  Patient is weightbearing as tolerated to left lower extremity      Continue n.p.o. until recommendations from hand surgery given.    Patient will need to follow-up with me 2 weeks from operative date of her ankle.  Call office to schedule appointment [phone]      We will continue to monitor while here    Please call with any questions or concerns thank you      Kenneth Tran MD    Date: 7/5/2022  Time: 10:23 EDT      Electronically signed by Kenneth Tran MD at 07/05/22 1041     Juan Grossman MD at 07/05/22 1006        ID note for cellulitis?  S:  Pain improved with ibuprofen  Tolerating abx  No f/c/ns    O: Temp:  [98.1 °F (36.7 °C)-98.9 °F (37.2 °C)] 98.4 °F (36.9 °C)  Heart Rate:  [63-91] 80  Resp:  [16-18] 18  BP: ()/(56-81) 133/66  GENERAL: Sickly, nontoxic  HEENT: Oropharynx is clear. Hearing is grossly normal.    left shoulder improved ROM, l second mcp joint swollen      White count 6.38, hemoglobin 11.9, platelets 359  Creatinine 0.61  6/28 Blood cultures no growth to date  6/28 left ankle arthrocentesis culture Group C strep (red blood cells 200,000, white blood cells 57,400, negative crystals)  6/30 left shoulder aspiration cultures  no growth    A/p  1.  Septic arthritis L ankle d/t Group C strep, s/p I and D on   2.  Left shoulder synovitis  3.  Polyarticular arthritis flare with positive RF; AARON, ccp negative,   4.  AFib, heart rate improved  5.  Lumbar discitis  6.  Aortic valve calcification, no clear vegetation and in discussions with cardiology holding on KENNETH as will receive medical therapy for endocarditis by treatment of the discitis    Orthopedic and spine surgery notes reviewed.  No acute interventions planned.  MRI positive for second mcp joint septic arthritis. Await ortho opinion to see if this needs I and D.  Plan six weeks of Rocephin 2 g IV every 24 hours, stop date August 10.       Electronically signed by Juan Grossman MD at 22 1014     Luis M Shaw MD at 22 0828            [facility]    [phone]   LOS: 6 days     Name: Marylu Georges  Age/Sex: 62 y.o. female  :  1960        PCP: Elie Dixon MD  Chief Complaint   Patient presents with   • Arm Swelling      Subjective   Her pain is better controlled today but this still seems to be her most pressing complaint.  Overall she is feeling a lot better denies new issues or complaints this morning.  Tolerating IV antibiotics denies fevers chills nausea vomiting or diarrhea presently.  General: No Fever or Chills, Cardiac: No Chest Pain or Palpitations, Resp: No Cough or SOA, GI: No Nausea, Vomiting, or Diarrhea and Other: No bleeding    cefTRIAXone, 2 g, Intravenous, Q24H  clobetasol, 1 application, Topical, Q12H  digoxin, 250 mcg, Oral, Daily  enoxaparin, 1 mg/kg, Subcutaneous, Q12H  exemestane, 25 mg, Oral, Daily  famotidine, 20 mg, Oral, BID AC  hydrOXYzine pamoate, 25 mg, Oral, TID  ibuprofen, 600 mg, Oral, Daily  lidocaine, 1 patch, Transdermal, Q24H  metoprolol tartrate, 50 mg, Oral, Q12H  sodium chloride, 10 mL, Intravenous, Q12H           Objective   Vital Signs  Temp:  [98.1 °F (36.7 °C)-98.9 °F (37.2  °C)] 98.4 °F (36.9 °C)  Heart Rate:  [63-91] 80  Resp:  [16-18] 18  BP: ()/(56-81) 133/66  Body mass index is 25.02 kg/m².    Intake/Output Summary (Last 24 hours) at 7/5/2022 0817  Last data filed at 7/5/2022 0801  Gross per 24 hour   Intake 300 ml   Output 1300 ml   Net -1000 ml       Physical Exam  Vitals reviewed.   Constitutional:       Appearance: Normal appearance.   Cardiovascular:      Rate and Rhythm: Normal rate and regular rhythm.   Pulmonary:      Effort: No respiratory distress.      Breath sounds: Normal breath sounds.   Abdominal:      General: Bowel sounds are normal. There is no distension.      Palpations: Abdomen is soft.   Neurological:      Mental Status: She is alert.           Results Review:       I reviewed the patient's new clinical results.  Results from last 7 days   Lab Units 07/05/22  0554 07/03/22  0611 07/02/22  0527 07/01/22  0419 06/30/22  0414   WBC 10*3/mm3 6.00 6.38 9.15 9.53 10.90*   HEMOGLOBIN g/dL 11.3* 11.9* 11.9* 11.5* 12.8   PLATELETS 10*3/mm3 415 359 357 299 266     Results from last 7 days   Lab Units 07/05/22  0554 07/04/22  0418 07/03/22  0611 07/02/22  0527 07/01/22  0419 06/30/22  0414 06/29/22  0530   SODIUM mmol/L 135*  --  133* 136 134* 137 135*   POTASSIUM mmol/L 4.5 5.0 4.4 4.1 4.3 4.1 4.3   CHLORIDE mmol/L 101  --  97* 97* 98 101 101   CO2 mmol/L 24.0  --  25.0 28.0 25.7 25.0 23.6   BUN mg/dL 14  --  15 16 17 20 17   CREATININE mg/dL 0.55*  --  0.61 0.72 0.64 0.58 0.68   CALCIUM mg/dL 8.7  --  8.6 8.9 8.7 8.9 9.2   MAGNESIUM mg/dL  --  2.1  --   --   --   --   --    PHOSPHORUS mg/dL 3.9  --   --   --   --   --   --    Estimated Creatinine Clearance: 114.7 mL/min (A) (by C-G formula based on SCr of 0.55 mg/dL (L)).      Assessment & Plan   Active Hospital Problems    Diagnosis  POA   • **Atrial fibrillation (HCC) [I48.91]  Yes   • New onset atrial fibrillation (HCC) [I48.91]  Yes   • Inflammatory arthritis [M19.90]  Yes   • Transaminitis [R74.01]  Yes   •  Lymphedema of upper extremity, bilateral [I89.0]  Yes   • Rash [R21]  Yes   • Streptococcal arthritis of left ankle (HCC) [M00.272]  Unknown   • Malignant neoplasm of overlapping sites of left breast in female, estrogen receptor positive (HCC) [C50.812, Z17.0]  Not Applicable      Resolved Hospital Problems   No resolved problems to display.       PLAN  62 y.o. female admitted with Atrial fibrillation (HCC).     · New onset afib with rvr-tsh was normal. Echocardiogram with patent foramen ovale and severely dilated left atrial cavity. Continue digoxin, and metoprolol. On therapeutic lovenox for AC. Will need to transition to an oral AC once it's clear that no further operative interventions or procedures are necessary.   · Left ankle septic arthritis with group c strep-s/p I&D on 6/30/22 by Dr. Tran. On ceftriaxone per ID thru 8/6  · Discitis, myositis of the lumbar spine-spine surgery has evaluated. No surgical intervention is recommend at this time. is consulted. On ceftriaxone as above.   · Left shoulder synovitis-went for joint aspiration on 6/30/22, but only scant material was able to be obtained. Culture is negative at 2 days.  · Left hand second MCP joint septic arthritis-MRI finally completed yesterday.  Hand surgery to evaluate today.  They have seen the patient awaiting the recommendations at this time.  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 cellulitis of the bilateral upper extremities and chest-greatly improved with topical steroids, antihistamines, and abx.  · Pain related to the above-continue norco, muscle relaxant, lidocaine patches, bowel regimen.   Unfortunately really did not get much extra relief with the ibuprofen given last night.  Difficult to say whether we should really consider long-acting pain management at this point.  I think we should discontinue her current regimen for now  · Elevated liver  enzymes-acute hepatitis panel was negative. Liver u/s showed some mild dilation of the pancreatic duct. Lipase was slightly elevated. Her LFTs are trending down and she's not having any abdominal pain/nausea/vomiting, so will hold off on MRCP for the time being unless LFTs worsen again or she develops abdominal symptoms.  · History of left sided breast cancer s/p bilateral mastectomy in 2019, chemo/xrt-aromasin  · History of pericardial mass which resolved with chemotherapy-not seen on TTE this admission  · History of CVA  · Essential hypertension-adequate control acutely  · therapeutic lovenox for DVT prophylaxis.  · pepcid for gi ppx   · Full code.  · Discussed with patient and nursing staff.  · Anticipate discharge home with HH vs SNU facility timing yet to be determined. pt indicates to me that she's not confident in her ability to safely return home and she's not sure whether her  will be able to adequately take care of her and she would prefer to go to rehab.           Luis M Shaw MD  [facility]  07/05/22  08:17 EDT            Electronically signed by Luis M Shaw MD at 07/05/22 1637          Consult Notes (last 72 hours)      Mode Voss MD at 07/05/22 2241      Consult Orders    1. Inpatient Hand Surgery Consult [[id number]] ordered by Kenneth Tran MD at 07/05/22 0748               .                                                                                                                                                     Patient Care Team:  Elie Dixon MD as PCP - General (Hematology and Oncology)  Joby Barron Jr., MD as Referring Physician (General Surgery)  Elie Dixon MD as Consulting Physician (Hematology and Oncology)  Pedro Marmolejo MD as Consulting Physician (Radiation Oncology)    Chief complaint   Bilateral upper extremities and left ankle pain, swelling.    History of present illness:   The patient is a 62-year-old lady who has  been admitted to [facility] 1 week ago.  According to patient, she noticed increase of pain and swelling over both upper extremity and the left ankle about 10 days ago.  Eventually, she underwent surgery by Dr. Tran and Left ankle incision and drainage was performed.  Both upper extremity pain and the swelling was treated nonsurgically.      According to patient, she is making reasonable progress with decrease of pain and increase of range of motion.  However, it still somewhat bothers her.  Symptoms are worse on left index finger MCP joint.  She denies any subjective fever or chills.    Review of Systems  CONSTITUTIONAL: As per HPI.   HEENT: Eyes: No diplopia or blurred vision. ENT: No earache, sore throat or runny nose.   CARDIOVASCULAR: No pressure, squeezing, strangling, tightness, heaviness or aching about the chest, neck, axilla or epigastrium.   RESPIRATORY: No cough, shortness of breath, PND or orthopnea.   GASTROINTESTINAL: No nausea, vomiting or diarrhea.   GENITOURINARY: No dysuria, frequency or urgency.   MUSCULOSKELETAL: As per HPI.   SKIN: No change in skin, hair or nails.   NEUROLOGIC: No paresthesias, fasciculations, seizures or weakness.   PSYCHIATRIC: No disorder of thought or mood.   ENDOCRINE: No heat or cold intolerance, polyuria or polydipsia.   HEMATOLOGICAL: No easy bruising or bleeding.       Past Medical History:   Diagnosis Date   • Anemia in neoplastic disease    • Arthritis    • Breast cancer (HCC)     Left   • CVA (cerebral vascular accident) (HCC)    • Hip pain     RIGHT HIP... CYST   • History of fracture of leg 1987   • History of radiation therapy     LAST TREATMENT     • Hypertension    • Limited joint range of motion (ROM)     RIGHT HIP   • Skin sore     OPEN SORE LEFT BREAST   • Syncope    • Vertigo      Past Surgical History:   Procedure Laterality Date   • AXILLARY LYMPH NODE BIOPSY/EXCISION Right     LYMPH NODE UNDER RIGHT ARM-MALIGNANT (DOUBLE MASTECTOMY)   •  BREAST BIOPSY Left     MALIGNANT   • FRACTURE SURGERY  1987    Leg   • HARDWARE REMOVAL     • INCISION AND DRAINAGE LEG Left 6/30/2022    Procedure: INCISION AND DRAINAGE left ankle;  Surgeon: Kenneth Tran MD;  Location: [facility];  Service: Orthopedics;  Laterality: Left;   • MASTECTOMY W/ SENTINEL NODE BIOPSY Bilateral 9/16/2019    Procedure: BILATERLA MODIFIED RADICAL MASTECTOMY WITH BILATERAL SENTINEL LYMPH NODE BIOPSY;  Surgeon: Joby Barron Jr., MD;  Location: [facility] OR;  Service: General   • TOTAL HIP ARTHROPLASTY Right 3/2/2020    Procedure: RIGHT TOTAL HIP ARTHROPLASTY NATALY NAVIGATION;  Surgeon: Luis M Leonard MD;  Location: [facility] OR;  Service: Orthopedics;  Laterality: Right;   • TOTAL HIP ARTHROPLASTY Left 7/20/2021    Procedure: Posterior LEFT TOTAL HIP ARTHROPLASTY NATALY NAVIGATION;  Surgeon: Luis M Leonard MD;  Location: [facility] OR;  Service: Orthopedics;  Laterality: Left;   • VENOUS ACCESS DEVICE (PORT) INSERTION Right 2/1/2019    Procedure: INSERTION VENOUS ACCESS DEVICE;  Surgeon: Joby Barron Jr., MD;  Location: [facility];  Service: General   • VENOUS ACCESS DEVICE (PORT) REMOVAL N/A 10/30/2019    Procedure: Mediport Removal;  Surgeon: Joby Barron Jr., MD;  Location: [facility];  Service: General     Family History   Problem Relation Age of Onset   • Lung cancer Father    • Irritable bowel syndrome Father    • Cancer Mother    • Breast cancer Mother    • Liver disease Mother    • Breast cancer Sister 48   • Breast cancer Maternal Grandmother    • Breast cancer Paternal Grandmother    • Breast cancer Maternal Uncle    • Malig Hyperthermia Neg Hx      Social History     Tobacco Use   • Smoking status: Never Smoker   • Smokeless tobacco: Never Used   Vaping Use   • Vaping Use: Never used   Substance Use Topics   • Alcohol use: Not Currently     Alcohol/week: 0.0 standard drinks     Comment: 2 CUPS DAILY   • Drug use: No     Medications Prior to  "Admission   Medication Sig Dispense Refill Last Dose   • acetaminophen (TYLENOL) 650 MG 8 hr tablet Take 1,950 mg by mouth Every 8 (Eight) Hours As Needed.      • Cholecalciferol 250 MCG (23503 UT) tablet Take 1 tablet by mouth. Patient stated dose as \"1500 mg\"      • exemestane (AROMASIN) 25 MG chemo tablet Take 1 tablet by mouth Daily. 30 tablet 3    • metoprolol succinate XL (TOPROL-XL) 25 MG 24 hr tablet Take 1 tablet by mouth Daily. 90 tablet 3    • gabapentin (NEURONTIN) 100 MG capsule TAKE ONE CAPSULE BY MOUTH ONCE NIGHTLY FOR INSOMNIA 90 capsule 0    • magnesium oxide (MAG-OX) 400 MG tablet Take 400 mg by mouth 2 (two) times a day. Patient stated dose as \"1500mg\"      • meclizine (ANTIVERT) 25 MG tablet Take 1 tablet by mouth 3 (Three) Times a Day As Needed for Dizziness. 90 tablet 0    • naproxen (NAPROSYN) 500 MG tablet Take 1 tablet by mouth 2 (Two) Times a Day As Needed for Mild Pain . 15 tablet 0      cefTRIAXone, 2 g, Intravenous, Q24H  clobetasol, 1 application, Topical, Q12H  digoxin, 125 mcg, Oral, Daily  enoxaparin, 1 mg/kg, Subcutaneous, Q12H  exemestane, 25 mg, Oral, Daily  famotidine, 20 mg, Oral, BID AC  hydrOXYzine pamoate, 25 mg, Oral, TID  ibuprofen, 600 mg, Oral, Daily  lidocaine, 1 patch, Transdermal, Q24H  metoprolol tartrate, 50 mg, Oral, Q12H  sodium chloride, 10 mL, Intravenous, Q12H      Allergies:   Benadryl [diphenhydramine], Erythromycin, Levaquin [levofloxacin], and Penicillins      Vital Signs   Temp:  [98.1 °F (36.7 °C)-99.1 °F (37.3 °C)] 99.1 °F (37.3 °C)  Heart Rate:  [63-84] 84  Resp:  [16-18] 16  BP: ()/(66-94) 126/90      Physical Exam:     General Appearance:    Alert, cooperative, in no acute distress   Head:    Normocephalic, without obvious abnormality, atraumatic   Eyes:            Lids and lashes normal, conjunctivae and sclerae normal, no   icterus, no pallor, corneas clear, PERRLA   Ears:    Ears appear intact with no abnormalities noted   Throat:   No oral " lesions, no thrush, oral mucosa moist   Neck:   No adenopathy, supple, trachea midline, no thyromegaly, no   carotid bruit, no JVD   Back:     No kyphosis present, no scoliosis present, no skin lesions,      erythema or scars, no tenderness to percussion or                   palpation,   range of motion normal   Lungs:     Clear to auscultation,respirations regular, even and                  unlabored    Heart:    Regular rhythm and normal rate, normal S1 and S2, no            murmur, no gallop, no rub, no click   Abdomen:     Normal bowel sounds, no masses, no organomegaly, soft        non-tender, non-distended, no guarding, no rebound                tenderness   Rectal:     Deferred   Extremities:   Moves all extremities well, no edema, no cyanosis, no             redness    LUE: viable with good blanching and capillary refills; mild diffuse swelling over elbow  L index finger: moderate diffuse swelling without any erythema; no open wound or fluid fluctuation; mild local tenderness; moderate limited range of motion; no increase of pain upon range of motion; N/V is grossly intact.       Results Review:   I reviewed the patient's new clinical results.      Latest Reference Range & Units 07/02/22 05:27 07/03/22 06:11 07/05/22 05:54   WBC 3.40 - 10.80 10*3/mm3 9.15 6.38 6.00   RBC 3.77 - 5.28 10*6/mm3 3.92 3.87 3.72 (L)   Hemoglobin 12.0 - 15.9 g/dL 11.9 (L) 11.9 (L) 11.3 (L)   Hematocrit 34.0 - 46.6 % 35.6 35.8 34.6   (L): Data is abnormally low  7/4/2022: MRI of L hand  1. Suspected septic arthritis at the 2nd MCP joint where there is joint  space narrowing with joint effusion and surrounding synovitis and soft  tissue inflammation. Mild marrow edema within the radial aspects of the  2nd metacarpal head and base of the 2nd proximal phalanx suspicious for  early osteomyelitis though no cortical loss is demonstrated.   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  There are also advanced arthritic  changes at the 1st CMC joint.    Atrial fibrillation (HCC)    Malignant neoplasm of overlapping sites of left breast in female, estrogen receptor positive (HCC)    Transaminitis    Lymphedema of upper extremity, bilateral    Rash    Inflammatory arthritis    Streptococcal arthritis of left ankle (HCC)    New onset atrial fibrillation (HCC)      Impression:  Left hand pain and swelling especially index finger MCP joint.    Plans:  1.  The cause of the problem is still most likely septic arthritis although I cannot completely rule out inflammatory joint disease.  The onset of symptoms was about 10 days and the patient is making reasonable progress with decrease of pain, increase of range of motion.  Although surgical intervention with joint exploration can be considered, considering benefits versus risk and the fact patient is making progress, I do not feel surgical intervention is necessary at this moment.  2.  I recommend continue antibiotics and treated as septic arthritis.  I encourage patient to go home range of motion.  Patient agreed with the plan.    3.  We will continue to follow.    Mode Voss MD  07/05/22  22:41 EDT  Office phone: [phone]        Electronically signed by Mode Voss MD at 07/05/22 0425

## 2022-07-08 NOTE — PROGRESS NOTES
Name: Marylu Georges ADMIT: 2022   : 1960  PCP: Elie Dixon MD    MRN: [id number] LOS: 9 days   AGE/SEX: 62 y.o. female  ROOM: [facility]   Subjective   Chief Complaint   Patient presents with   • Arm Swelling     Pain is fair  Still with joint swelling    On ABX  H/o breast cancer and   H/o previous mastectomy     ROS  No f/c  No n/v  No cp/palp  No soa/cough    Objective   Vital Signs  Temp:  [97.8 °F (36.6 °C)-98.6 °F (37 °C)] 98.5 °F (36.9 °C)  Heart Rate:  [68-75] 68  Resp:  [18] 18  BP: ()/(62-82) 113/74  SpO2:  [95 %-100 %] 95 %  on   ;   Device (Oxygen Therapy): room air  Body mass index is 25.02 kg/m².    Physical Exam  Constitutional:       General: She is not in acute distress.  HENT:      Head: Normocephalic and atraumatic.   Eyes:      General: No scleral icterus.  Cardiovascular:      Rate and Rhythm: Normal rate. Rhythm irregular.      Heart sounds: Normal heart sounds.   Pulmonary:      Effort: Pulmonary effort is normal. No respiratory distress.   Abdominal:      General: There is no distension.      Palpations: Abdomen is soft.   Musculoskeletal:      Cervical back: Neck supple.   Neurological:      Mental Status: She is alert.   Psychiatric:         Behavior: Behavior normal.     inflammatory joint changes to multiple joints    Results Review:       I reviewed the patient's new clinical results.  Results from last 7 days   Lab Units 22  0554 22  0554 22  0622  0527   WBC 10*3/mm3 5.45 6.00 6.38 9.15   HEMOGLOBIN g/dL 11.6* 11.3* 11.9* 11.9*   PLATELETS 10*3/mm3 468* 415 359 357     Results from last 7 days   Lab Units 22  0554 22  0554 22  0418 22  0611 22  0527   SODIUM mmol/L 134* 135*  --  133* 136   POTASSIUM mmol/L 4.2 4.5 5.0 4.4 4.1   CHLORIDE mmol/L 101 101  --  97* 97*   CO2 mmol/L 25.5 24.0  --  25.0 28.0   BUN mg/dL 16 14  --  15 16   CREATININE mg/dL 0.61 0.55*  --  0.61 0.72   GLUCOSE mg/dL 93 93  --   96 111*   Estimated Creatinine Clearance: 103.4 mL/min (by C-G formula based on SCr of 0.61 mg/dL).  Results from last 7 days   Lab Units 07/06/22  0554 07/05/22  0554 07/03/22  0611 07/02/22  0527   ALBUMIN g/dL 3.10* 3.00* 2.70* 3.00*   BILIRUBIN mg/dL 0.5  --  0.5 0.6   ALK PHOS U/L 105  --  109 124*   AST (SGOT) U/L 37*  --  41* 61*   ALT (SGPT) U/L 44*  --  50* 61*     Results from last 7 days   Lab Units 07/06/22  0554 07/05/22  0554 07/04/22  0418 07/03/22 0611 07/02/22  0527   CALCIUM mg/dL 8.9 8.7  --  8.6 8.9   ALBUMIN g/dL 3.10* 3.00*  --  2.70* 3.00*   MAGNESIUM mg/dL  --   --  2.1  --   --    PHOSPHORUS mg/dL  --  3.9  --   --   --          Coag     HbA1C   Lab Results   Component Value Date    HGBA1C 5.60 07/30/2019     Infection       Radiology(recent) No radiology results for the last day  No results found for: TROPONINT, TROPONINI, BNP  No components found for: TSH;2    apixaban, 5 mg, Oral, Q12H  cefTRIAXone, 2 g, Intravenous, Q24H  clobetasol, 1 application, Topical, Q12H  digoxin, 125 mcg, Oral, Daily  exemestane, 25 mg, Oral, Daily  famotidine, 20 mg, Oral, BID AC  hydrOXYzine pamoate, 25 mg, Oral, TID  ibuprofen, 200 mg, Oral, Daily  lidocaine, 1 patch, Transdermal, Q24H  metoprolol tartrate, 75 mg, Oral, Q12H  sodium chloride, 10 mL, Intravenous, Q12H       Diet Regular      Assessment & Plan      Active Hospital Problems    Diagnosis  POA   • **Streptococcal arthritis of left ankle (HCC) [M00.272]  Yes   • New onset atrial fibrillation (HCC) [I48.91]  Yes   • Inflammatory arthritis [M19.90]  Yes   • Atrial fibrillation (HCC) [I48.91]  Yes   • Transaminitis [R74.01]  Yes   • Lymphedema of upper extremity, bilateral [I89.0]  Yes   • Rash [R21]  Yes   • Malignant neoplasm of overlapping sites of left breast in female, estrogen receptor positive (HCC) [C50.812, Z17.0]  Not Applicable      Resolved Hospital Problems   No resolved problems to display.       62 year old woman with a history of left  sided breast cancer s/p bilateral mastectomy in 2019 and chemo/xrt on aromasin, history of a pericardial mass which resolved with chemotherapy, history of cva, essential hypertension who presented from home with bilateral upper extremity swelling and swelling in her left ankle and a rash on the right arm. She also had new onset Afib with RVR.  Arthrocentesis of the left ankle very surprisingly showed strep c. She is s/p wash out. MRI of the left shoulder showed synovitis. MRI of the lumbar spine showed discitis and myositis.  MRI  does show MCP septic arthritis.  Spine Surgery, Hand Surgery, Orthopedics do not recommend surgical intervention at this time.          · New onset afib with rvr-tsh was normal. Echocardiogram with patent foramen ovale and severely dilated left atrial cavity. Continue digoxin, and metoprolol.  eliquis 5mg po BID.     · Left ankle septic arthritis with group c strep-s/p I&D on 6/30/22 by Dr. Tran. Ceftriaxone 2 g IV every 24 hours, stop date August 10, 2022. Check weekly CBC with differential, serum creatinine and fax to Dr. Grossman at [phone].  Follow-up with Dr. Grossman in the infectious diseases clinic on August 10, 2022     With history of double mastectomy with bilateral upper arm lymphedema will consult surgery for line placement for the antibiotics.     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 Recommended to patient to follow up with Rheumatology as outpatient. Did have LE doppler negative for DVT      · Discussed with patient and nursing staff.      · Anticipate discharge possible acute rehab vs subacute rehab- precert is pending    Greater than 38 minutes spent with greater than 50% counseling and coordinating care      DW staff  Reviewed records      Frank Wadsworth MD  [facility]  07/08/22  13:13 EDT

## 2022-07-08 NOTE — PLAN OF CARE
Goal Outcome Evaluation:              Outcome Evaluation: Pt A&Ox4, VSS and pain well controlled with PRN meds. General surgery consulted for central line placement d/t hx of double mastectomy and mignon upper arm edema because pt will need long standing IV abx. Will CTM.

## 2022-07-08 NOTE — PROGRESS NOTES
[facility] Acute Inpt Rehab Note    Noted Francis started precert, No BAR bed availability today. S/w CCP Mirella RICKS to inform and informed will sign off.    Zainab Harris RN  [facility] Admission Nurse  [phone]

## 2022-07-08 NOTE — CONSULTS
Iv team consulted to place a picc for iv abx until 8-10-22 per Dr Grossman. Pt is a double mastectomy with bilateral upper arm lymphedema which are both contraindicated for a picc placement. This will put the pt at greater risk for clots, infection, and swelling. It obviously would be recommended to have a port or subclavian line to avoid the upper arms. Spoke with DR Frank Wadsworth and he is going to consult vascular for other options. Picc line is being discontinued at this time. Reported to Julia GUARDADO that picc is being discontinued and vascular will be consulted per Dr Wadsworth for the reasons mentioned above.

## 2022-07-09 LAB
ANION GAP SERPL CALCULATED.3IONS-SCNC: 11 MMOL/L (ref 5–15)
BUN SERPL-MCNC: 18 MG/DL (ref 8–23)
BUN/CREAT SERPL: 22.5 (ref 7–25)
CALCIUM SPEC-SCNC: 9.1 MG/DL (ref 8.6–10.5)
CHLORIDE SERPL-SCNC: 103 MMOL/L (ref 98–107)
CO2 SERPL-SCNC: 26 MMOL/L (ref 22–29)
CREAT SERPL-MCNC: 0.8 MG/DL (ref 0.57–1)
DEPRECATED RDW RBC AUTO: 42.1 FL (ref 37–54)
EGFRCR SERPLBLD CKD-EPI 2021: 82.4 ML/MIN/1.73
ERYTHROCYTE [DISTWIDTH] IN BLOOD BY AUTOMATED COUNT: 13 % (ref 12.3–15.4)
GLUCOSE SERPL-MCNC: 96 MG/DL (ref 65–99)
HCT VFR BLD AUTO: 31.2 % (ref 34–46.6)
HGB BLD-MCNC: 10.4 G/DL (ref 12–15.9)
MCH RBC QN AUTO: 30.2 PG (ref 26.6–33)
MCHC RBC AUTO-ENTMCNC: 33.3 G/DL (ref 31.5–35.7)
MCV RBC AUTO: 90.7 FL (ref 79–97)
PLATELET # BLD AUTO: 428 10*3/MM3 (ref 140–450)
PMV BLD AUTO: 8.6 FL (ref 6–12)
POTASSIUM SERPL-SCNC: 4.3 MMOL/L (ref 3.5–5.2)
RBC # BLD AUTO: 3.44 10*6/MM3 (ref 3.77–5.28)
SODIUM SERPL-SCNC: 140 MMOL/L (ref 136–145)
WBC NRBC COR # BLD: 4.34 10*3/MM3 (ref 3.4–10.8)

## 2022-07-09 PROCEDURE — 97530 THERAPEUTIC ACTIVITIES: CPT

## 2022-07-09 PROCEDURE — 80048 BASIC METABOLIC PNL TOTAL CA: CPT | Performed by: INTERNAL MEDICINE

## 2022-07-09 PROCEDURE — 25010000002 CEFTRIAXONE PER 250 MG: Performed by: HOSPITALIST

## 2022-07-09 PROCEDURE — 85027 COMPLETE CBC AUTOMATED: CPT | Performed by: INTERNAL MEDICINE

## 2022-07-09 RX ORDER — HYDROCODONE BITARTRATE AND ACETAMINOPHEN 7.5; 325 MG/1; MG/1
1 TABLET ORAL EVERY 4 HOURS PRN
Status: DISCONTINUED | OUTPATIENT
Start: 2022-07-09 | End: 2022-07-13 | Stop reason: HOSPADM

## 2022-07-09 RX ADMIN — APIXABAN 5 MG: 5 TABLET, FILM COATED ORAL at 21:31

## 2022-07-09 RX ADMIN — FAMOTIDINE 20 MG: 20 TABLET ORAL at 09:24

## 2022-07-09 RX ADMIN — EXEMESTANE 25 MG: 25 TABLET, FILM COATED ORAL at 09:33

## 2022-07-09 RX ADMIN — HYDROCODONE BITARTRATE AND ACETAMINOPHEN 1 TABLET: 7.5; 325 TABLET ORAL at 04:43

## 2022-07-09 RX ADMIN — HYDROCODONE BITARTRATE AND ACETAMINOPHEN 1 TABLET: 7.5; 325 TABLET ORAL at 00:49

## 2022-07-09 RX ADMIN — TIZANIDINE 4 MG: 4 TABLET ORAL at 15:12

## 2022-07-09 RX ADMIN — CLOBETASOL PROPIONATE 1 APPLICATION: 0.5 CREAM TOPICAL at 21:31

## 2022-07-09 RX ADMIN — HYDROCODONE BITARTRATE AND ACETAMINOPHEN 1 TABLET: 7.5; 325 TABLET ORAL at 21:33

## 2022-07-09 RX ADMIN — TIZANIDINE 4 MG: 4 TABLET ORAL at 21:33

## 2022-07-09 RX ADMIN — METOPROLOL TARTRATE 75 MG: 25 TABLET, FILM COATED ORAL at 21:31

## 2022-07-09 RX ADMIN — CLOBETASOL PROPIONATE 1 APPLICATION: 0.5 CREAM TOPICAL at 09:27

## 2022-07-09 RX ADMIN — TIZANIDINE 4 MG: 4 TABLET ORAL at 09:24

## 2022-07-09 RX ADMIN — BISACODYL 10 MG: 10 SUPPOSITORY RECTAL at 15:47

## 2022-07-09 RX ADMIN — HYDROXYZINE PAMOATE 25 MG: 25 CAPSULE ORAL at 15:13

## 2022-07-09 RX ADMIN — HYDROXYZINE PAMOATE 25 MG: 25 CAPSULE ORAL at 09:27

## 2022-07-09 RX ADMIN — CEFTRIAXONE SODIUM 2 G: 2 INJECTION, POWDER, FOR SOLUTION INTRAMUSCULAR; INTRAVENOUS at 12:51

## 2022-07-09 RX ADMIN — DIGOXIN 125 MCG: 125 TABLET ORAL at 12:51

## 2022-07-09 RX ADMIN — APIXABAN 5 MG: 5 TABLET, FILM COATED ORAL at 09:24

## 2022-07-09 RX ADMIN — HYDROCODONE BITARTRATE AND ACETAMINOPHEN 1 TABLET: 7.5; 325 TABLET ORAL at 13:12

## 2022-07-09 RX ADMIN — HYDROXYZINE PAMOATE 25 MG: 25 CAPSULE ORAL at 21:31

## 2022-07-09 RX ADMIN — POLYETHYLENE GLYCOL 3350 17 G: 17 POWDER, FOR SOLUTION ORAL at 09:28

## 2022-07-09 RX ADMIN — Medication 10 ML: at 09:30

## 2022-07-09 RX ADMIN — FAMOTIDINE 20 MG: 20 TABLET ORAL at 18:34

## 2022-07-09 RX ADMIN — LIDOCAINE 1 PATCH: 50 PATCH TOPICAL at 21:31

## 2022-07-09 RX ADMIN — Medication 10 ML: at 21:31

## 2022-07-09 RX ADMIN — TIZANIDINE 4 MG: 4 TABLET ORAL at 00:49

## 2022-07-09 RX ADMIN — IBUPROFEN 200 MG: 200 TABLET, FILM COATED ORAL at 18:34

## 2022-07-09 NOTE — NURSING NOTE
VS WNL.Pain mgmt effective with PRN. PICC line to R upper arm ordered per GS.Complaints of constipation today, PRN suppository administered,effective.pt reports BM*1.WCTM.

## 2022-07-09 NOTE — PROGRESS NOTES
Patient seen and examined.  Needs intravenous access for next month or so for antibiotics.  She does not have any evidence of right upper extremity lymphedema.  Given the overall situation and length of need for intravenous access, I think a right upper extremity PICC line would be the best option and I have ordered such.  If attempted PICC line placement is unsuccessful, then surgical line can be considered.

## 2022-07-09 NOTE — PROGRESS NOTES
Name: Marylu Georges ADMIT: 2022   : 1960  PCP: Elie Dixon MD    MRN: [id number] LOS: 10 days   AGE/SEX: 62 y.o. female  ROOM: [facility]     Subjective   Subjective   She states she continues to improve with less swelling and increased range of motion.     Review of Systems   Constitutional: Negative for fever.   Respiratory: Negative for cough and shortness of breath.    Cardiovascular: Negative for chest pain.   Gastrointestinal: Negative for abdominal pain, diarrhea, nausea and vomiting.   Genitourinary: Negative for dysuria.   Musculoskeletal: Positive for arthralgias, back pain and joint swelling.   Neurological: Negative for headaches.      Objective   Objective   Vital Signs  Temp:  [97.7 °F (36.5 °C)-98.6 °F (37 °C)] 97.7 °F (36.5 °C)  Heart Rate:  [58-86] 69  Resp:  [18] 18  BP: ()/(57-71) 92/63  SpO2:  [95 %-99 %] 97 %  on   ;   Device (Oxygen Therapy): room air  Body mass index is 25.02 kg/m².    Physical Exam  Constitutional:       General: She is not in acute distress.     Appearance: Normal appearance. She is not toxic-appearing.   HENT:      Head: Normocephalic and atraumatic.   Cardiovascular:      Rate and Rhythm: Normal rate and regular rhythm.   Pulmonary:      Effort: Pulmonary effort is normal. No respiratory distress.      Breath sounds: Normal breath sounds. No wheezing or rhonchi.   Abdominal:      General: Bowel sounds are normal.      Palpations: Abdomen is soft.      Tenderness: There is no abdominal tenderness. There is no guarding or rebound.   Musculoskeletal:      Comments: Trace to 1+ edema left arm and leg   Skin:     General: Skin is warm and dry.   Neurological:      General: No focal deficit present.      Mental Status: She is alert and oriented to person, place, and time.   Psychiatric:         Mood and Affect: Mood normal.         Behavior: Behavior normal.         Thought Content: Thought content normal.     Results Review  I reviewed the patient's  new clinical results.  Results from last 7 days   Lab Units 07/09/22 0441 07/06/22  0554 07/05/22  0554 07/03/22  0611   WBC 10*3/mm3 4.34 5.45 6.00 6.38   HEMOGLOBIN g/dL 10.4* 11.6* 11.3* 11.9*   PLATELETS 10*3/mm3 428 468* 415 359     Results from last 7 days   Lab Units 07/09/22 0441 07/06/22 0554 07/05/22  0554 07/04/22 0418 07/03/22  0611   SODIUM mmol/L 140 134* 135*  --  133*   POTASSIUM mmol/L 4.3 4.2 4.5 5.0 4.4   CHLORIDE mmol/L 103 101 101  --  97*   CO2 mmol/L 26.0 25.5 24.0  --  25.0   BUN mg/dL 18 16 14  --  15   CREATININE mg/dL 0.80 0.61 0.55*  --  0.61   GLUCOSE mg/dL 96 93 93  --  96     Lab Results   Component Value Date    ANIONGAP 11.0 07/09/2022     Estimated Creatinine Clearance: 78.8 mL/min (by C-G formula based on SCr of 0.8 mg/dL).    Results from last 7 days   Lab Units 07/06/22 0554 07/05/22 0554 07/03/22  0611   ALBUMIN g/dL 3.10* 3.00* 2.70*   BILIRUBIN mg/dL 0.5  --  0.5   ALK PHOS U/L 105  --  109   AST (SGOT) U/L 37*  --  41*   ALT (SGPT) U/L 44*  --  50*     Results from last 7 days   Lab Units 07/09/22 0441 07/06/22 0554 07/05/22  0554 07/04/22 0418 07/03/22  0611   CALCIUM mg/dL 9.1 8.9 8.7  --  8.6   ALBUMIN g/dL  --  3.10* 3.00*  --  2.70*   MAGNESIUM mg/dL  --   --   --  2.1  --    PHOSPHORUS mg/dL  --   --  3.9  --   --        Glucose   Date/Time Value Ref Range Status   07/07/2022 1104 128 70 - 130 mg/dL Final     Comment:     Meter: [id number] : [id number] Pedro Garibay ROSS       Scheduled Meds  apixaban, 5 mg, Oral, Q12H  cefTRIAXone, 2 g, Intravenous, Q24H  clobetasol, 1 application, Topical, Q12H  digoxin, 125 mcg, Oral, Daily  exemestane, 25 mg, Oral, Daily  famotidine, 20 mg, Oral, BID AC  hydrOXYzine pamoate, 25 mg, Oral, TID  ibuprofen, 200 mg, Oral, Daily  lidocaine, 1 patch, Transdermal, Q24H  metoprolol tartrate, 75 mg, Oral, Q12H  sodium chloride, 10 mL, Intravenous, Q12H    Continuous Infusions   PRN Meds  senna-docusate sodium **AND** polyethylene  glycol **AND** bisacodyl **AND** bisacodyl  •  HYDROcodone-acetaminophen  •  nitroglycerin  •  ondansetron **OR** ondansetron  •  simethicone  •  [COMPLETED] Insert peripheral IV **AND** sodium chloride  •  sodium chloride  •  tiZANidine  •  traZODone     Diet  Diet Regular    I have personally reviewed:  [x]  Laboratory   [x]  Microbiology   [x]  Radiology   []  EKG/Telemetry   []  Cardiology/Vascular   []  Pathology   []  Records    Results for orders placed during the hospital encounter of 06/27/22    Adult Transthoracic Echo Complete W/ Cont if Necessary Per Protocol    Interpretation Summary  · Estimated left ventricular EF = 55% Left ventricular systolic function is normal.  · Left ventricular diastolic function was indeterminate.  · Saline test results are positive with valsalva manuever. Small patent foramen ovale present.  · There is calcification of the aortic valve mainly affecting the non-coronary, left coronary and right coronary cusp(s).  · Estimated right ventricular systolic pressure from tricuspid regurgitation is mildly elevated (35-45 mmHg). Calculated right ventricular systolic pressure from tricuspid regurgitation is 41.1 mmHg.  · The left atrial cavity is severely dilated.        Assessment/Plan     Active Hospital Problems    Diagnosis  POA   • **Streptococcal arthritis of left ankle (HCC) [M00.272]  Yes   • New onset atrial fibrillation (HCC) [I48.91]  Yes   • Inflammatory arthritis [M19.90]  Yes   • Atrial fibrillation (HCC) [I48.91]  Yes   • Transaminitis [R74.01]  Yes   • Lymphedema of upper extremity, bilateral [I89.0]  Yes   • Rash [R21]  Yes   • Malignant neoplasm of overlapping sites of left breast in female, estrogen receptor positive (HCC) [C50.812, Z17.0]  Not Applicable      Resolved Hospital Problems   No resolved problems to display.     62 y.o. female with a history of left-sided breast cancer status post bilateral mastectomy in 2019, stroke, hypertension presented with  bilateral upper extremity swelling and left ankle swelling and rash. She also had new onset A. fib with RVR. Arthrocentesis of left ankle showed group C strep and she is now status post washout (6/30/22). MRI left shoulder showed synovitis. MRI of lumbar spine showed discitis and myositis. She also had left hand MCP septic arthritis. Aortic valve calcification with suspected endocarditis.    ID plans 6 weeks of IV Rocephin stop date August 10, 2022 with weekly labs. Follow-up ID August 10. PICC line has been ordered.     New onset atrial fibrillation with RVR: Echocardiogram with PFO and severely dilated left atrial cavity. Digoxin, metoprolol, Eliquis.    SCDs for DVT prophylaxis    Discussed with patient and nursing staff    Discharge: SNF awaiting precert    Hansel Colunga MD  [facility]  07/09/22

## 2022-07-09 NOTE — PLAN OF CARE
Goal Outcome Evaluation:  Plan of Care Reviewed With: patient        Progress: improving  Outcome Evaluation: Pt in bed at beg of PT session and sat up to EOB req SBA. Pt performed sit to stand req CGA and use of fww. Pt then amb 100'+ w/ slow, antalgic gait req SBA and use of fww. Pt states she feels she is having a better day today although further states her ability to amb is unpredictable. Pt returned to bed req sba. PT will prog as pt ayala.    Patient was intermittently wearing a face mask during this therapy encounter. Therapist used appropriate personal protective equipment including mask and gloves.  Mask used was standard procedure mask. Appropriate PPE was worn during the entire therapy session. Hand hygiene was completed before and after therapy session. Patient is not in enhanced droplet precautions.

## 2022-07-09 NOTE — THERAPY TREATMENT NOTE
Patient Name: Marylu Georges  : 1960    MRN: [id number]                              Today's Date: 2022       Admit Date: 2022    Visit Dx:     ICD-10-CM ICD-9-CM   1. Streptococcal arthritis of left ankle (HCC)  M00.272 711.07     041.00   2. Atrial fibrillation, unspecified type (HCC)  I48.91 427.31   3. Elevated LFTs  R79.89 790.6   4. Elevated C-reactive protein (CRP)  R79.82 790.95   5. Elevated sed rate  R70.0 790.1   6. Edema of both upper extremities  R60.0 782.3     Patient Active Problem List   Diagnosis   • Malignant neoplasm of overlapping sites of left breast in female, estrogen receptor positive (HCC)   • Family history of breast cancer in female   • Syncope   • Malignant neoplasm of overlapping sites of both breasts in female, estrogen receptor positive (HCC)   • Hypertension   • Encounter for long-term (current) use of other medications   • Drug-induced hepatitis   • Atrial fibrillation (HCC)   • Transaminitis   • Lymphedema of upper extremity, bilateral   • Rash   • Inflammatory arthritis   • Streptococcal arthritis of left ankle (HCC)   • New onset atrial fibrillation (HCC)     Past Medical History:   Diagnosis Date   • Anemia in neoplastic disease    • Arthritis    • Breast cancer (HCC)     Left   • CVA (cerebral vascular accident) (HCC)    • Hip pain     RIGHT HIP... CYST   • History of fracture of leg    • History of radiation therapy     LAST TREATMENT     • Hypertension    • Limited joint range of motion (ROM)     RIGHT HIP   • Skin sore     OPEN SORE LEFT BREAST   • Syncope    • Vertigo      Past Surgical History:   Procedure Laterality Date   • AXILLARY LYMPH NODE BIOPSY/EXCISION Right     LYMPH NODE UNDER RIGHT ARM-MALIGNANT (DOUBLE MASTECTOMY)   • BREAST BIOPSY Left     MALIGNANT   • FRACTURE SURGERY      Leg   • HARDWARE REMOVAL     • INCISION AND DRAINAGE LEG Left 2022    Procedure: INCISION AND DRAINAGE left ankle;  Surgeon: Kenneth Tran MD;   Location: [facility] MAIN OR;  Service: Orthopedics;  Laterality: Left;   • MASTECTOMY W/ SENTINEL NODE BIOPSY Bilateral 9/16/2019    Procedure: BILATERLA MODIFIED RADICAL MASTECTOMY WITH BILATERAL SENTINEL LYMPH NODE BIOPSY;  Surgeon: Joby Barron Jr., MD;  Location: [facility] MAIN OR;  Service: General   • TOTAL HIP ARTHROPLASTY Right 3/2/2020    Procedure: RIGHT TOTAL HIP ARTHROPLASTY NATALY NAVIGATION;  Surgeon: Luis M Leonard MD;  Location: [facility] MAIN OR;  Service: Orthopedics;  Laterality: Right;   • TOTAL HIP ARTHROPLASTY Left 7/20/2021    Procedure: Posterior LEFT TOTAL HIP ARTHROPLASTY NATALY NAVIGATION;  Surgeon: Luis M Leonard MD;  Location: [facility] MAIN OR;  Service: Orthopedics;  Laterality: Left;   • VENOUS ACCESS DEVICE (PORT) INSERTION Right 2/1/2019    Procedure: INSERTION VENOUS ACCESS DEVICE;  Surgeon: Joby Barron Jr., MD;  Location: [facility];  Service: General   • VENOUS ACCESS DEVICE (PORT) REMOVAL N/A 10/30/2019    Procedure: Mediport Removal;  Surgeon: Joby Barron Jr., MD;  Location: [facility] OR;  Service: General      General Information     Row Name 07/09/22 1211          Physical Therapy Time and Intention    Document Type therapy note (daily note)  -PH     Mode of Treatment physical therapy  -     Row Name 07/09/22 1211          General Information    Existing Precautions/Restrictions fall  -     Row Name 07/09/22 1211          Safety Issues, Functional Mobility    Impairments Affecting Function (Mobility) balance;pain;endurance/activity tolerance  -PH     Comment, Safety Issues/Impairments (Mobility) gt belt and non skid socks worn for pt safety  -PH           User Key  (r) = Recorded By, (t) = Taken By, (c) = Cosigned By    Initials Name Provider Type    PH Amalia Yap PTA Physical Therapist Assistant               Mobility     Row Name 07/09/22 1211          Bed Mobility    Bed Mobility bed mobility (all) activities  -PH     All Activities, [place]  (Bed Mobility) standby assist  -PH     Assistive Device (Bed Mobility) bed rails;head of bed elevated  -PH     Row Name 07/09/22 1211          Sit-Stand Transfer    Sit-Stand [place] (Transfers) nonverbal cues (demo/gesture);verbal cues;contact guard  -PH     Assistive Device (Sit-Stand Transfers) walker, front-wheeled  -PH     Row Name 07/09/22 1211          Gait/Stairs (Locomotion)    [place] Level (Gait) standby assist  -PH     Assistive Device (Gait) walker, front-wheeled  -PH     Distance in Feet (Gait) 100'+  -PH     Deviations/Abnormal Patterns (Gait) antalgic;weight shifting decreased;jonnathan decreased;gait speed decreased  -PH     Bilateral Gait Deviations forward flexed posture;heel strike decreased  -PH     [place] Level (Stairs) unable to assess  -PH     Comment, (Gait/Stairs) slow and antalgic w/ no LOB; cues for upright posture  -PH     Row Name 07/09/22 1211          Mobility    Extremity Weight-bearing Status left lower extremity  -PH     Left Lower Extremity (Weight-bearing Status) weight-bearing as tolerated (WBAT)  -PH           User Key  (r) = Recorded By, (t) = Taken By, (c) = Cosigned By    Initials Name Provider Type     Amalia Yap PTA Physical Therapist Assistant               Obj/Interventions     Row Name 07/09/22 1212          Balance    Balance Assessment sitting static balance;standing static balance  -PH     Static Sitting Balance standby assist  -PH     Static Standing Balance standby assist  -PH     Position/Device Used, Standing Balance walker, front-wheeled  -PH           User Key  (r) = Recorded By, (t) = Taken By, (c) = Cosigned By    Initials Name Provider Type     Amalia Yap PTA Physical Therapist Assistant               Goals/Plan    No documentation.                Clinical Impression     Row Name 07/09/22 1213          Pain    Pre/Posttreatment Pain Comment pt reports improvement in pain w/ med  -PH     Pain Intervention(s)  Ambulation/increased activity  -PH     Additional Documentation Pain Scale: FACES Pre/Post-Treatment (Group)  -PH     Row Name 07/09/22 1213          Pain Scale: FACES Pre/Post-Treatment    Pain: FACES Scale, Pretreatment 4-->hurts little more  -PH     Posttreatment Pain Rating 4-->hurts little more  -PH     Row Name 07/09/22 1213          Plan of Care Review    Plan of Care Reviewed With patient  -PH     Progress improving  -PH     Outcome Evaluation Pt in bed at beg of PT session and sat up to EOB req SBA. Pt performed sit to stand req CGA and use of fww. Pt then amb 100'+ w/ slow, antalgic gait req SBA and use of fww. Pt states she feels she is having a better day today although further states her ability to amb is unpredictable. Pt returned to bed req sba. PT will prog as pt ayala.  -PH     Row Name 07/09/22 1213          Vital Signs    O2 Delivery Pre Treatment room air  -PH     O2 Delivery Intra Treatment room air  -PH     O2 Delivery Post Treatment room air  -PH     Row Name 07/09/22 1213          Positioning and Restraints    Pre-Treatment Position in bed  -PH     Post Treatment Position bed  -PH     In Bed fowlers;call light within reach;encouraged to call for assist;exit alarm on  declined chair  -PH           User Key  (r) = Recorded By, (t) = Taken By, (c) = Cosigned By    Initials Name Provider Type    PH Amalia Yap PTA Physical Therapist Assistant               Outcome Measures     Row Name 07/09/22 1216          How much help from another person do you currently need...    Turning from your back to your side while in flat bed without using bedrails? 4  -PH     Moving from lying on back to sitting on the side of a flat bed without bedrails? 3  -PH     Moving to and from a bed to a chair (including a wheelchair)? 3  -PH     Standing up from a chair using your arms (e.g., wheelchair, bedside chair)? 4  -PH     Climbing 3-5 steps with a railing? 2  -PH     To walk in hospital room? 3  -PH      -[facility] 6 Clicks Score (PT) 19  -     Highest level of mobility 6 --> Walked 10 steps or more  -PH     Row Name 07/09/22 1216          Functional Assessment    Outcome Measure Options AM-PAC 6 Clicks Basic Mobility (PT)  -           User Key  (r) = Recorded By, (t) = Taken By, (c) = Cosigned By    Initials Name Provider Type     Amalia Yap PTA Physical Therapist Assistant                             Physical Therapy Education                 Title: PT OT SLP Therapies (Done)     Topic: Physical Therapy (Done)     Point: Mobility training (Done)     Learning Progress Summary           Patient Acceptance, E,D, VU by  at 7/9/2022 1216      Show all documentation for this point (8)                 Point: Home exercise program (Done)     Learning Progress Summary           Patient Acceptance, E,D, VU by  at 7/9/2022 1216      Show all documentation for this point (4)                 Point: Body mechanics (Done)     Learning Progress Summary           Patient Acceptance, E,D, VU by  at 7/9/2022 1216      Show all documentation for this point (8)                 Point: Precautions (Done)     Learning Progress Summary           Patient Acceptance, E,D, VU by  at 7/9/2022 1216      Show all documentation for this point (7)                             User Key     Initials Effective Dates Name Provider Type Discipline     06/16/21 -  Amalia Yap PTA Physical Therapist Assistant PT              PT Recommendation and Plan     Plan of Care Reviewed With: patient  Progress: improving  Outcome Evaluation: Pt in bed at beg of PT session and sat up to EOB req SBA. Pt performed sit to stand req CGA and use of fww. Pt then amb 100'+ w/ slow, antalgic gait req SBA and use of fww. Pt states she feels she is having a better day today although further states her ability to amb is unpredictable. Pt returned to bed req sba. PT will prog as pt ayala.     Time Calculation:    PT Charges     Row Name  07/09/22 1217             Time Calculation    Start Time 0955  -PH      Stop Time 1014  -PH      Time Calculation (min) 19 min  -PH      PT Received On 07/09/22  -PH      PT - Next Appointment 07/10/22  -PH              Timed Charges    52380 - PT Therapeutic Activity Minutes 19  -PH              Total Minutes    Timed Charges Total Minutes 19  -PH       Total Minutes 19  -PH            User Key  (r) = Recorded By, (t) = Taken By, (c) = Cosigned By    Initials Name Provider Type    Amalia Johnson PTA Physical Therapist Assistant              Therapy Charges for Today     Code Description Service Date Service Provider Modifiers Qty    81506656011  PT THERAPEUTIC ACT EA 15 MIN 7/9/2022 Amalia Yap PTA GP 1          PT G-Codes  Outcome Measure Options: AM-PAC 6 Clicks Basic Mobility (PT)  AM-PAC 6 Clicks Score (PT): 19  AM-PAC 6 Clicks Score (OT): 10  Modified Susan Scale: 4 - Moderately severe disability.  Unable to walk without assistance, and unable to attend to own bodily needs without assistance.    Amalia Yap PTA  7/9/2022

## 2022-07-09 NOTE — PLAN OF CARE
Goal Outcome Evaluation:  Plan of Care Reviewed With: patient        Progress: improving  Outcome Evaluation: VSS. Complained of pain and muscle spasms throughout shift - relieved with PRN meds. Weight shift assistance provided. BP noted to be slightly lower than normal - pt asymptomatic and BP monitored. +Up to bathroom with standy by assist + walker. Lidoderm applied to back. General sx to see pt today regarding PICC line for D/C. No other changes this shift. WCM

## 2022-07-10 ENCOUNTER — APPOINTMENT (OUTPATIENT)
Dept: GENERAL RADIOLOGY | Facility: HOSPITAL | Age: 62
End: 2022-07-10

## 2022-07-10 PROCEDURE — 25010000002 CEFTRIAXONE PER 250 MG: Performed by: HOSPITALIST

## 2022-07-10 PROCEDURE — C1751 CATH, INF, PER/CENT/MIDLINE: HCPCS

## 2022-07-10 PROCEDURE — 02HV33Z INSERTION OF INFUSION DEVICE INTO SUPERIOR VENA CAVA, PERCUTANEOUS APPROACH: ICD-10-PCS | Performed by: SURGERY

## 2022-07-10 RX ORDER — SODIUM CHLORIDE 0.9 % (FLUSH) 0.9 %
10 SYRINGE (ML) INJECTION EVERY 12 HOURS SCHEDULED
Status: CANCELLED | OUTPATIENT
Start: 2022-07-10

## 2022-07-10 RX ORDER — SODIUM CHLORIDE 0.9 % (FLUSH) 0.9 %
10 SYRINGE (ML) INJECTION AS NEEDED
Status: CANCELLED | OUTPATIENT
Start: 2022-07-10

## 2022-07-10 RX ORDER — SODIUM CHLORIDE 0.9 % (FLUSH) 0.9 %
20 SYRINGE (ML) INJECTION AS NEEDED
Status: CANCELLED | OUTPATIENT
Start: 2022-07-10

## 2022-07-10 RX ADMIN — HYDROCODONE BITARTRATE AND ACETAMINOPHEN 1 TABLET: 7.5; 325 TABLET ORAL at 16:02

## 2022-07-10 RX ADMIN — HYDROXYZINE PAMOATE 25 MG: 25 CAPSULE ORAL at 16:03

## 2022-07-10 RX ADMIN — CEFTRIAXONE SODIUM 2 G: 2 INJECTION, POWDER, FOR SOLUTION INTRAMUSCULAR; INTRAVENOUS at 11:47

## 2022-07-10 RX ADMIN — IBUPROFEN 200 MG: 200 TABLET, FILM COATED ORAL at 18:58

## 2022-07-10 RX ADMIN — HYDROCODONE BITARTRATE AND ACETAMINOPHEN 1 TABLET: 7.5; 325 TABLET ORAL at 11:49

## 2022-07-10 RX ADMIN — CLOBETASOL PROPIONATE 1 APPLICATION: 0.5 CREAM TOPICAL at 20:00

## 2022-07-10 RX ADMIN — Medication 10 ML: at 20:01

## 2022-07-10 RX ADMIN — TIZANIDINE 4 MG: 4 TABLET ORAL at 20:01

## 2022-07-10 RX ADMIN — LIDOCAINE 1 PATCH: 50 PATCH TOPICAL at 20:01

## 2022-07-10 RX ADMIN — HYDROXYZINE PAMOATE 25 MG: 25 CAPSULE ORAL at 08:23

## 2022-07-10 RX ADMIN — HYDROCODONE BITARTRATE AND ACETAMINOPHEN 1 TABLET: 7.5; 325 TABLET ORAL at 06:45

## 2022-07-10 RX ADMIN — Medication 10 ML: at 08:27

## 2022-07-10 RX ADMIN — EXEMESTANE 25 MG: 25 TABLET, FILM COATED ORAL at 08:35

## 2022-07-10 RX ADMIN — DIGOXIN 125 MCG: 125 TABLET ORAL at 11:47

## 2022-07-10 RX ADMIN — CLOBETASOL PROPIONATE 1 APPLICATION: 0.5 CREAM TOPICAL at 08:23

## 2022-07-10 RX ADMIN — APIXABAN 5 MG: 5 TABLET, FILM COATED ORAL at 20:00

## 2022-07-10 RX ADMIN — HYDROXYZINE PAMOATE 25 MG: 25 CAPSULE ORAL at 20:00

## 2022-07-10 RX ADMIN — FAMOTIDINE 20 MG: 20 TABLET ORAL at 06:45

## 2022-07-10 RX ADMIN — FAMOTIDINE 20 MG: 20 TABLET ORAL at 17:41

## 2022-07-10 RX ADMIN — APIXABAN 5 MG: 5 TABLET, FILM COATED ORAL at 08:23

## 2022-07-10 RX ADMIN — METOPROLOL TARTRATE 75 MG: 25 TABLET, FILM COATED ORAL at 20:00

## 2022-07-10 RX ADMIN — TIZANIDINE 4 MG: 4 TABLET ORAL at 06:45

## 2022-07-10 NOTE — SIGNIFICANT NOTE
07/10/22 1849   PICC Single Lumen 07/10/22 Right Basilic   Placement Date/Time: 07/10/22 1845   Hand Hygiene Completed: Yes  Size (Fr): 4  Description (optional): single lumen picc  Length (cm): 36 cm  Orientation: Right  Location: Basilic  Site Prep: Chlorhexidine  All 5 Sterile Barriers Used (Gloves, Gown, ...   Site Assessment Clean;Dry;Intact   #1 Lumen Status Blood return noted;Capped;Flushed;Normal saline locked   Length nelson (cm) 2 cm   Extremity Circumference (cm) 29 cm   Dressing Status Clean;Dry;Intact   Dressing Intervention New dressing   Liquid Adhesive Contraindicated (comment)   Dressing Change Due 07/17/22   Indication/Daily Review of Necessity blood sampling;intravenous fluid therapy;intravenous medication therapy   [id number] EXPIRES 2023-09-30    Chart reviewed and Dr Marti evaluated pt for lymphedema bilat and noted the following on 7-9-22...    Patient seen and examined.  Needs intravenous access for next month or so for antibiotics.  She does not have any evidence of right upper extremity lymphedema.  Given the overall situation and length of need for intravenous access, I think a right upper extremity PICC line would be the best option and I have ordered such.  If attempted PICC line placement is unsuccessful, then surgical line can be considered    Single lumen picc was placed in ALEC without difficulty. Educated pt on S/S of infection and blood clots to when to notify MD, pt acknowledged understanding. Chest xray ordered to verify tip location. Reported to Aiyana GUARDADO not to use picc at this time and xray ordered. Will call results.

## 2022-07-10 NOTE — PLAN OF CARE
Goal Outcome Evaluation:     No acute changes this shift.Pain mgmt effective with PRN.Up to bathroom with assist .Held metoprolol today d/t  soft BP &HR.Awaiting PICC line placement WCTM.

## 2022-07-10 NOTE — PLAN OF CARE
Goal Outcome Evaluation:  Plan of Care Reviewed With: patient        Progress: improving  Outcome Evaluation: VSS. HR controlled between 50-60s while sleeping. Slight drop in BP - pt asymptomatic. PRN meds given x1 this shift with relief. Stand by assist when OOB. PICC line to be placed today. No other changes. WCM

## 2022-07-10 NOTE — PROGRESS NOTES
Name: Marylu Georges ADMIT: 2022   : 1960  PCP: Elie Dixon MD    MRN: [id number] LOS: 11 days   AGE/SEX: 62 y.o. female  ROOM: [facility]     Subjective   Subjective   She feels like she is improving. Less swelling and pain.    Review of Systems   Constitutional: Negative for fever.   Respiratory: Negative for cough and shortness of breath.    Cardiovascular: Negative for chest pain.   Gastrointestinal: Negative for abdominal pain, diarrhea, nausea and vomiting.   Genitourinary: Negative for dysuria.   Musculoskeletal: Positive for arthralgias, back pain and joint swelling.   Neurological: Negative for headaches.      Objective   Objective   Vital Signs  Temp:  [97.9 °F (36.6 °C)-98.3 °F (36.8 °C)] 98.3 °F (36.8 °C)  Heart Rate:  [65-86] 67  Resp:  [18] 18  BP: ()/(59-83) 96/62  SpO2:  [97 %-99 %] 97 %  on   ;   Device (Oxygen Therapy): nasal cannula with reservoir (i.e. oximizer);room air  Body mass index is 25.02 kg/m².    Physical Exam  Constitutional:       General: She is not in acute distress.     Appearance: Normal appearance. She is not toxic-appearing.   HENT:      Head: Normocephalic and atraumatic.   Cardiovascular:      Rate and Rhythm: Normal rate and regular rhythm.   Pulmonary:      Effort: Pulmonary effort is normal. No respiratory distress.      Breath sounds: Normal breath sounds. No wheezing or rhonchi.   Abdominal:      General: Bowel sounds are normal.      Palpations: Abdomen is soft.      Tenderness: There is no abdominal tenderness. There is no guarding or rebound.   Musculoskeletal:      Right lower leg: No edema.      Left lower leg: No edema.   Skin:     General: Skin is warm and dry.   Neurological:      General: No focal deficit present.      Mental Status: She is alert and oriented to person, place, and time.   Psychiatric:         Mood and Affect: Mood normal.         Behavior: Behavior normal.         Thought Content: Thought content normal.     Results  Review  I reviewed the patient's new clinical results.  Results from last 7 days   Lab Units 07/09/22 0441 07/06/22  0554 07/05/22  0554   WBC 10*3/mm3 4.34 5.45 6.00   HEMOGLOBIN g/dL 10.4* 11.6* 11.3*   PLATELETS 10*3/mm3 428 468* 415     Results from last 7 days   Lab Units 07/09/22 0441 07/06/22  0554 07/05/22  0554 07/04/22  0418   SODIUM mmol/L 140 134* 135*  --    POTASSIUM mmol/L 4.3 4.2 4.5 5.0   CHLORIDE mmol/L 103 101 101  --    CO2 mmol/L 26.0 25.5 24.0  --    BUN mg/dL 18 16 14  --    CREATININE mg/dL 0.80 0.61 0.55*  --    GLUCOSE mg/dL 96 93 93  --      Lab Results   Component Value Date    ANIONGAP 11.0 07/09/2022     Estimated Creatinine Clearance: 78.8 mL/min (by C-G formula based on SCr of 0.8 mg/dL).    Results from last 7 days   Lab Units 07/06/22  0554 07/05/22  0554   ALBUMIN g/dL 3.10* 3.00*   BILIRUBIN mg/dL 0.5  --    ALK PHOS U/L 105  --    AST (SGOT) U/L 37*  --    ALT (SGPT) U/L 44*  --      Results from last 7 days   Lab Units 07/09/22 0441 07/06/22 0554 07/05/22  0554 07/04/22  0418   CALCIUM mg/dL 9.1 8.9 8.7  --    ALBUMIN g/dL  --  3.10* 3.00*  --    MAGNESIUM mg/dL  --   --   --  2.1   PHOSPHORUS mg/dL  --   --  3.9  --        No results found for: HGBA1C, POCGLU    Scheduled Meds  apixaban, 5 mg, Oral, Q12H  cefTRIAXone, 2 g, Intravenous, Q24H  clobetasol, 1 application, Topical, Q12H  digoxin, 125 mcg, Oral, Daily  exemestane, 25 mg, Oral, Daily  famotidine, 20 mg, Oral, BID AC  hydrOXYzine pamoate, 25 mg, Oral, TID  ibuprofen, 200 mg, Oral, Daily  lidocaine, 1 patch, Transdermal, Q24H  metoprolol tartrate, 75 mg, Oral, Q12H  sodium chloride, 10 mL, Intravenous, Q12H    Continuous Infusions   PRN Meds  senna-docusate sodium **AND** polyethylene glycol **AND** bisacodyl **AND** bisacodyl  •  HYDROcodone-acetaminophen  •  nitroglycerin  •  ondansetron **OR** ondansetron  •  simethicone  •  [COMPLETED] Insert peripheral IV **AND** sodium chloride  •  sodium chloride  •   tiZANidine  •  traZODone     Diet  Diet Regular        Assessment/Plan     Active Hospital Problems    Diagnosis  POA   • **Streptococcal arthritis of left ankle (HCC) [M00.272]  Yes   • New onset atrial fibrillation (HCC) [I48.91]  Yes   • Inflammatory arthritis [M19.90]  Yes   • Atrial fibrillation (HCC) [I48.91]  Yes   • Transaminitis [R74.01]  Yes   • Lymphedema of upper extremity, bilateral [I89.0]  Yes   • Rash [R21]  Yes   • Malignant neoplasm of overlapping sites of left breast in female, estrogen receptor positive (HCC) [C50.812, Z17.0]  Not Applicable      Resolved Hospital Problems   No resolved problems to display.     62 y.o. female with a history of left-sided breast cancer status post bilateral mastectomy in 2019, stroke, hypertension presented with bilateral upper extremity swelling and left ankle swelling and rash. She also had new onset A. fib with RVR. Arthrocentesis of left ankle showed group C strep and she is now status post washout (6/30/22). MRI left shoulder showed synovitis. MRI of lumbar spine showed discitis and myositis. She also had left hand MCP septic arthritis. Aortic valve calcification with suspected endocarditis.    ID plans 6 weeks of IV Rocephin stop date August 10, 2022 with weekly labs. Follow-up ID August 10. PICC line has been ordered.     New onset atrial fibrillation with RVR: Echocardiogram with PFO and severely dilated left atrial cavity. Digoxin, metoprolol, Eliquis.    SCDs for DVT prophylaxis    Discussed with patient and nursing staff    Discharge: SNF awaiting precert    Hansel Colunga MD  [facility]  07/10/22

## 2022-07-11 PROCEDURE — 99024 POSTOP FOLLOW-UP VISIT: CPT | Performed by: ORTHOPAEDIC SURGERY

## 2022-07-11 PROCEDURE — 97530 THERAPEUTIC ACTIVITIES: CPT

## 2022-07-11 PROCEDURE — 25010000002 CEFTRIAXONE PER 250 MG: Performed by: HOSPITALIST

## 2022-07-11 RX ORDER — AMOXICILLIN 250 MG
2 CAPSULE ORAL NIGHTLY
Status: DISCONTINUED | OUTPATIENT
Start: 2022-07-11 | End: 2022-07-13 | Stop reason: HOSPADM

## 2022-07-11 RX ORDER — POLYETHYLENE GLYCOL 3350 17 G/17G
17 POWDER, FOR SOLUTION ORAL 2 TIMES DAILY
Status: DISCONTINUED | OUTPATIENT
Start: 2022-07-11 | End: 2022-07-13 | Stop reason: HOSPADM

## 2022-07-11 RX ADMIN — APIXABAN 5 MG: 5 TABLET, FILM COATED ORAL at 09:46

## 2022-07-11 RX ADMIN — METOPROLOL TARTRATE 75 MG: 25 TABLET, FILM COATED ORAL at 09:46

## 2022-07-11 RX ADMIN — DOCUSATE SODIUM 50MG AND SENNOSIDES 8.6MG 2 TABLET: 8.6; 5 TABLET, FILM COATED ORAL at 20:11

## 2022-07-11 RX ADMIN — HYDROCODONE BITARTRATE AND ACETAMINOPHEN 1 TABLET: 7.5; 325 TABLET ORAL at 14:44

## 2022-07-11 RX ADMIN — POLYETHYLENE GLYCOL 3350 17 G: 17 POWDER, FOR SOLUTION ORAL at 20:11

## 2022-07-11 RX ADMIN — POLYETHYLENE GLYCOL 3350 17 G: 17 POWDER, FOR SOLUTION ORAL at 09:46

## 2022-07-11 RX ADMIN — CEFTRIAXONE SODIUM 2 G: 2 INJECTION, POWDER, FOR SOLUTION INTRAMUSCULAR; INTRAVENOUS at 12:16

## 2022-07-11 RX ADMIN — TIZANIDINE 4 MG: 4 TABLET ORAL at 21:32

## 2022-07-11 RX ADMIN — FAMOTIDINE 20 MG: 20 TABLET ORAL at 06:36

## 2022-07-11 RX ADMIN — LIDOCAINE 1 PATCH: 50 PATCH TOPICAL at 20:11

## 2022-07-11 RX ADMIN — DIGOXIN 125 MCG: 125 TABLET ORAL at 12:16

## 2022-07-11 RX ADMIN — FAMOTIDINE 20 MG: 20 TABLET ORAL at 18:12

## 2022-07-11 RX ADMIN — EXEMESTANE 25 MG: 25 TABLET, FILM COATED ORAL at 09:46

## 2022-07-11 RX ADMIN — HYDROCODONE BITARTRATE AND ACETAMINOPHEN 1 TABLET: 7.5; 325 TABLET ORAL at 06:36

## 2022-07-11 RX ADMIN — TIZANIDINE 4 MG: 4 TABLET ORAL at 14:46

## 2022-07-11 RX ADMIN — Medication 10 ML: at 20:11

## 2022-07-11 RX ADMIN — Medication 10 ML: at 09:46

## 2022-07-11 RX ADMIN — IBUPROFEN 200 MG: 200 TABLET, FILM COATED ORAL at 18:12

## 2022-07-11 RX ADMIN — METOPROLOL TARTRATE 75 MG: 25 TABLET, FILM COATED ORAL at 20:11

## 2022-07-11 RX ADMIN — CLOBETASOL PROPIONATE 1 APPLICATION: 0.5 CREAM TOPICAL at 20:12

## 2022-07-11 RX ADMIN — HYDROCODONE BITARTRATE AND ACETAMINOPHEN 1 TABLET: 7.5; 325 TABLET ORAL at 21:32

## 2022-07-11 RX ADMIN — APIXABAN 5 MG: 5 TABLET, FILM COATED ORAL at 20:11

## 2022-07-11 NOTE — PROGRESS NOTES
Name: Marylu Georges ADMIT: 2022   : 1960  PCP: Elie Dixon MD    MRN: [id number] LOS: 12 days   AGE/SEX: 62 y.o. female  ROOM: [facility]     Subjective   Subjective   No new complaints. Pain and ROM improved. Feels a little constipated asking for bowel medications to be scheduled.    Review of Systems   Constitutional: Negative for fever.   Respiratory: Negative for cough and shortness of breath.    Cardiovascular: Negative for chest pain.   Gastrointestinal: Negative for abdominal pain, diarrhea, nausea and vomiting.   Genitourinary: Negative for dysuria.   Neurological: Negative for headaches.      Objective   Objective   Vital Signs  Temp:  [97.8 °F (36.6 °C)-98.8 °F (37.1 °C)] 98.8 °F (37.1 °C)  Heart Rate:  [66-92] 67  Resp:  [18] 18  BP: ()/(70-99) 116/99  SpO2:  [94 %-100 %] 99 %  on   ;   Device (Oxygen Therapy): room air  Body mass index is 25.02 kg/m².    Physical Exam  Constitutional:       General: She is not in acute distress.     Appearance: Normal appearance. She is not toxic-appearing.   HENT:      Head: Normocephalic and atraumatic.   Cardiovascular:      Rate and Rhythm: Normal rate and regular rhythm.   Pulmonary:      Effort: Pulmonary effort is normal. No respiratory distress.      Breath sounds: Normal breath sounds. No wheezing or rhonchi.   Abdominal:      General: Bowel sounds are normal.      Palpations: Abdomen is soft.      Tenderness: There is no abdominal tenderness. There is no guarding or rebound.   Musculoskeletal:      Right lower leg: No edema.      Left lower leg: No edema.   Skin:     General: Skin is warm and dry.   Neurological:      General: No focal deficit present.      Mental Status: She is alert and oriented to person, place, and time.   Psychiatric:         Mood and Affect: Mood normal.         Behavior: Behavior normal.         Thought Content: Thought content normal.     Results Review  I reviewed the patient's new clinical results.  Results  from last 7 days   Lab Units 07/09/22  0441 07/06/22  0554 07/05/22  0554   WBC 10*3/mm3 4.34 5.45 6.00   HEMOGLOBIN g/dL 10.4* 11.6* 11.3*   PLATELETS 10*3/mm3 428 468* 415     Results from last 7 days   Lab Units 07/09/22 0441 07/06/22  0554 07/05/22  0554   SODIUM mmol/L 140 134* 135*   POTASSIUM mmol/L 4.3 4.2 4.5   CHLORIDE mmol/L 103 101 101   CO2 mmol/L 26.0 25.5 24.0   BUN mg/dL 18 16 14   CREATININE mg/dL 0.80 0.61 0.55*   GLUCOSE mg/dL 96 93 93     Lab Results   Component Value Date    ANIONGAP 11.0 07/09/2022     Estimated Creatinine Clearance: 78.8 mL/min (by C-G formula based on SCr of 0.8 mg/dL).    Results from last 7 days   Lab Units 07/06/22  0554 07/05/22  0554   ALBUMIN g/dL 3.10* 3.00*   BILIRUBIN mg/dL 0.5  --    ALK PHOS U/L 105  --    AST (SGOT) U/L 37*  --    ALT (SGPT) U/L 44*  --      Results from last 7 days   Lab Units 07/09/22 0441 07/06/22  0554 07/05/22  0554   CALCIUM mg/dL 9.1 8.9 8.7   ALBUMIN g/dL  --  3.10* 3.00*   PHOSPHORUS mg/dL  --   --  3.9         Scheduled Meds  apixaban, 5 mg, Oral, Q12H  cefTRIAXone, 2 g, Intravenous, Q24H  clobetasol, 1 application, Topical, Q12H  digoxin, 125 mcg, Oral, Daily  exemestane, 25 mg, Oral, Daily  famotidine, 20 mg, Oral, BID AC  hydrOXYzine pamoate, 25 mg, Oral, TID  ibuprofen, 200 mg, Oral, Daily  lidocaine, 1 patch, Transdermal, Q24H  metoprolol tartrate, 75 mg, Oral, Q12H  polyethylene glycol, 17 g, Oral, BID  senna-docusate sodium, 2 tablet, Oral, Nightly  sodium chloride, 10 mL, Intravenous, Q12H    Continuous Infusions   PRN Meds  [DISCONTINUED] senna-docusate sodium **AND** [DISCONTINUED] polyethylene glycol **AND** bisacodyl **AND** bisacodyl  •  HYDROcodone-acetaminophen  •  nitroglycerin  •  ondansetron **OR** ondansetron  •  simethicone  •  [COMPLETED] Insert peripheral IV **AND** sodium chloride  •  sodium chloride  •  tiZANidine  •  traZODone     Diet  Diet Regular        Assessment/Plan     Active Hospital Problems     Diagnosis  POA   • **Streptococcal arthritis of left ankle (HCC) [M00.272]  Yes   • New onset atrial fibrillation (HCC) [I48.91]  Yes   • Inflammatory arthritis [M19.90]  Yes   • Atrial fibrillation (HCC) [I48.91]  Yes   • Transaminitis [R74.01]  Yes   • Lymphedema of upper extremity, bilateral [I89.0]  Yes   • Rash [R21]  Yes   • Malignant neoplasm of overlapping sites of left breast in female, estrogen receptor positive (HCC) [C50.812, Z17.0]  Not Applicable      Resolved Hospital Problems   No resolved problems to display.     62 y.o. female with a history of left-sided breast cancer status post bilateral mastectomy in 2019, stroke, hypertension presented with bilateral upper extremity swelling and left ankle swelling and rash. She also had new onset A. fib with RVR. Arthrocentesis of left ankle showed group C strep and she is now status post washout (6/30/22). MRI left shoulder showed synovitis. MRI of lumbar spine showed discitis and myositis. She also had left hand MCP septic arthritis. Aortic valve calcification with suspected endocarditis.    ID plans 6 weeks of IV Rocephin stop date August 10, 2022 with weekly labs. Follow-up ID August 10. PICC line placed.    New onset atrial fibrillation with RVR: Echocardiogram with PFO and severely dilated left atrial cavity. Digoxin, metoprolol, Eliquis.    SCDs for DVT prophylaxis. Adding scheduled bowel medications.    Discussed with patient and nursing staff    Discharge: SNF awaiting precert    Hansel Colunga MD  [facility]  07/11/22

## 2022-07-11 NOTE — PLAN OF CARE
Goal Outcome Evaluation:  Plan of Care Reviewed With: patient        Progress: improving  Pt doing well today. Ambulated x3 around nurses station. Up with walker and standby assist. Awaiting precert. VSS. Continue to monitor

## 2022-07-11 NOTE — PLAN OF CARE
Goal Outcome Evaluation:  Plan of Care Reviewed With: patient        Progress: improving  Outcome Evaluation: VSS. HR remains at a controlled rate. BP on the lower side but still stable. PRN meds given per pt request. +Up with stand by assist to bathroom. PICC line in place and CXR obtained for placement confirmation. IV rocephin given this shift d/t delay in PICC line placement. No other changes this shift. Waiting for bed @ rehab. [facility]

## 2022-07-11 NOTE — PLAN OF CARE
Goal Outcome Evaluation:  Plan of Care Reviewed With: patient        Progress: no change  Outcome Evaluation: Pt agreeable to PT and sat up to EOB w/ mod I. Pt stood req SBA and use of fww w/ extra time taken standing w/ pt's L foot flat on floor although ankle/foot painful. Pt amb 150' w/ SBA and use of fww. Pt returned to bed req mod I where she performed isometric exercises using gt belt for L ankle. PT will prog as pt ayala.    Patient was intermittently wearing a face mask during this therapy encounter. Therapist used appropriate personal protective equipment including mask and gloves.  Mask used was standard procedure mask. Appropriate PPE was worn during the entire therapy session. Hand hygiene was completed before and after therapy session. Patient is not in enhanced droplet precautions.

## 2022-07-11 NOTE — PROGRESS NOTES
"Nutrition Services      Encounter Information         Trending Narrative   Length of stay    Tests/Procedures POD 11 Irrigation debridement left ankle joint       Current Nutrition Orders & Evaluation of Intake       Oral Nutrition     Current PO Diet Diet Regular   PO Evaluation     Trending % PO Intake 100% of meals         Factors Affecting Intake  pain issues   --    Anthropometrics          Height    Weight Height: 167.6 cm (66\")  Weight: 70.3 kg (155 lb) (06/27/22 1343)    BMI kg/m2 Body mass index is 25.02 kg/m².    Weight trend      Labs        Pertinent Labs Reviewed, listed below     Results from last 7 days   Lab Units 07/09/22 0441 07/06/22 0554 07/05/22  0554   SODIUM mmol/L 140 134* 135*   POTASSIUM mmol/L 4.3 4.2 4.5   CHLORIDE mmol/L 103 101 101   CO2 mmol/L 26.0 25.5 24.0   BUN mg/dL 18 16 14   CREATININE mg/dL 0.80 0.61 0.55*   CALCIUM mg/dL 9.1 8.9 8.7   BILIRUBIN mg/dL  --  0.5  --    ALK PHOS U/L  --  105  --    ALT (SGPT) U/L  --  44*  --    AST (SGOT) U/L  --  37*  --    GLUCOSE mg/dL 96 93 93     Results from last 7 days   Lab Units 07/09/22 0441 07/06/22  0554 07/05/22  0554   PHOSPHORUS mg/dL  --   --  3.9   HEMOGLOBIN g/dL 10.4*   < > 11.3*   HEMATOCRIT % 31.2*   < > 34.6   WBC 10*3/mm3 4.34   < > 6.00    < > = values in this interval not displayed.     Results from last 7 days   Lab Units 07/09/22 0441 07/06/22  0554 07/05/22  0554   PLATELETS 10*3/mm3 428 468* 415     COVID19   Date Value Ref Range Status   06/30/2022 Not Detected Not Detected - Ref. Range Final     Lab Results   Component Value Date    HGBA1C 5.60 07/30/2019          Medications            Scheduled Medications apixaban, 5 mg, Oral, Q12H  cefTRIAXone, 2 g, Intravenous, Q24H  clobetasol, 1 application, Topical, Q12H  digoxin, 125 mcg, Oral, Daily  exemestane, 25 mg, Oral, Daily  famotidine, 20 mg, Oral, BID AC  ibuprofen, 200 mg, Oral, Daily  lidocaine, 1 patch, Transdermal, Q24H  metoprolol tartrate, 75 mg, Oral, " Q12H  polyethylene glycol, 17 g, Oral, BID  senna-docusate sodium, 2 tablet, Oral, Nightly  sodium chloride, 10 mL, Intravenous, Q12H        Infusions      PRN Medications [DISCONTINUED] senna-docusate sodium **AND** [DISCONTINUED] polyethylene glycol **AND** bisacodyl **AND** bisacodyl  •  HYDROcodone-acetaminophen  •  nitroglycerin  •  ondansetron **OR** ondansetron  •  simethicone  •  [COMPLETED] Insert peripheral IV **AND** sodium chloride  •  sodium chloride  •  tiZANidine  •  traZODone     Physical Findings         Physical Appearance alert    NFPE Not applicable   --   Edema  no edema    Gastrointestinal constipation    Tubes/Drains none    Oral/Mouth Cavity WNL    Skin other:irrigation /debridement L ankle joint   --  Intervention Goal        Intervention Goal(s) Maintain intake     Nutrition Intervention        RD Action Encourage intake, Follow Tx Progress and Care plan reviewed     Nutrition Prescription         Diet Prescription    Supplement Prescription n/a   Prescription Ordered    --  Monitor/Evaluation        Monitor Per protocol, I&O, PO intake, Pertinent labs, Weight, Skin status, GI status, Symptoms     RD to follow up per protocol.    Electronically signed by:  Solange Blackwell, [email or web address]  07/11/22 15:17 EDT

## 2022-07-11 NOTE — PROGRESS NOTES
Orthopedic Progress Note      Patient: Marylu Georges    YOB: 1960    Medical Record Number: [id number]    Attending Physician: Hansel Colunga MD    Date of Admission: 6/27/2022  6:51 AM    Admitting Dx:  Elevated C-reactive protein (CRP) [R79.82]  Elevated sed rate [R70.0]  Elevated LFTs [R79.89]  Edema of both upper extremities [R60.0]  Atrial fibrillation, unspecified type (HCC) [I48.91]  New onset atrial fibrillation (HCC) [I48.91]  Discitis Lumbar spine    Status Post: Irrigation debridement left ankle joint    Post Operative Day Number: 11    Current Problem List:   Patient Active Problem List   Diagnosis    Malignant neoplasm of overlapping sites of left breast in female, estrogen receptor positive (HCC)    Family history of breast cancer in female    Syncope    Malignant neoplasm of overlapping sites of both breasts in female, estrogen receptor positive (HCC)    Hypertension    Encounter for long-term (current) use of other medications    Drug-induced hepatitis    Atrial fibrillation (HCC)    Transaminitis    Lymphedema of upper extremity, bilateral    Rash    Inflammatory arthritis    Streptococcal arthritis of left ankle (HCC)    New onset atrial fibrillation (HCC)         Past Medical History:   Diagnosis Date    Anemia in neoplastic disease     Arthritis     Breast cancer (HCC)     Left    CVA (cerebral vascular accident) (HCC)     Hip pain     RIGHT HIP... CYST    History of fracture of leg 1987    History of radiation therapy     LAST TREATMENT      Hypertension     Limited joint range of motion (ROM)     RIGHT HIP    Skin sore     OPEN SORE LEFT BREAST    Syncope     Vertigo        Current Medications:  Scheduled Meds:apixaban, 5 mg, Oral, Q12H  cefTRIAXone, 2 g, Intravenous, Q24H  clobetasol, 1 application, Topical, Q12H  digoxin, 125 mcg, Oral, Daily  exemestane, 25 mg, Oral, Daily  famotidine, 20 mg, Oral, BID AC  ibuprofen, 200 mg, Oral, Daily  lidocaine, 1 patch,  Transdermal, Q24H  metoprolol tartrate, 75 mg, Oral, Q12H  polyethylene glycol, 17 g, Oral, BID  senna-docusate sodium, 2 tablet, Oral, Nightly  sodium chloride, 10 mL, Intravenous, Q12H      PRN Meds:.[DISCONTINUED] senna-docusate sodium **AND** [DISCONTINUED] polyethylene glycol **AND** bisacodyl **AND** bisacodyl    HYDROcodone-acetaminophen    nitroglycerin    ondansetron **OR** ondansetron    simethicone    [COMPLETED] Insert peripheral IV **AND** sodium chloride    sodium chloride    tiZANidine    traZODone    SUBJECTIVE: 62 y.o.  female.  Awake and alert.  No complaints at present    OBJECTIVE:   Vitals:    07/10/22 2118 07/11/22 0005 07/11/22 0317 07/11/22 0743   BP:  96/73  116/99   BP Location:  Right arm  Right arm   Patient Position:  Lying  Lying   Pulse: 81 72 88 67   Resp:  18  18   Temp:  98.8 °F (37.1 °C)     TempSrc:  Oral  Oral   SpO2: 96% 95%  99%   Weight:       Height:         No intake/output data recorded.    Diagnostic Tests:   Lab Results (last 24 hours)       ** No results found for the last 24 hours. **            PHYSICAL EXAM: Incision to anterior left ankle is intact.  Dressing was changed today by nursing staff.  She has no erythema.  Still has some slight swelling in the left foot and ankle.  Capillary refill remains brisk.  Has good motion and sensation to her toes.  Ankle has limited range of motion due to stiffness and pain.  I have encouraged her to work on active range of motion of that left foot and ankle.  She is ambulating 100 feet.  Still complaining of pain in her back on occasion but her pain is well controlled with oral medication.  Patient only gets up once a day to walk.  She is walking to the bathroom several times throughout the day.  She has not been sitting up in the chair due to the chair causing her increased back pain.  Patient is still waiting on insurance approval to go to rehab.  Also complaining of some constipation due to her immobility and her  narcotics.    ASSESSMENT & PLAN:  Have encouraged the patient that she needs to be more mobile.  Would recommend that she try ambulating at least 2-3 times a day with staff or PT.  She also needs to work on range of motion of the left ankle.  Patient needs to return to the office in 4 weeks to see Dr. Meredith for repeat x-rays of her lumbar spine.  Sooner if need be.  Will plan to remove sutures from her left ankle later this week if she is still in the hospital    Date: 7/11/2022    Maria Isabel Tam RN        Follow up with us in 1 week for suture removal if she gets discharged within the next day or so.  Otherwise we will remove sutures.  Encouraged to continue therapy and work on ankle range of motion.    call for questions or concerns thank you    I have reviewed and agree with the above.    Kenneth Tran MD

## 2022-07-11 NOTE — THERAPY TREATMENT NOTE
Patient Name: Marylu Georges  : 1960    MRN: [id number]                              Today's Date: 2022       Admit Date: 2022    Visit Dx:     ICD-10-CM ICD-9-CM   1. Streptococcal arthritis of left ankle (HCC)  M00.272 711.07     041.00   2. Atrial fibrillation, unspecified type (HCC)  I48.91 427.31   3. Elevated LFTs  R79.89 790.6   4. Elevated C-reactive protein (CRP)  R79.82 790.95   5. Elevated sed rate  R70.0 790.1   6. Edema of both upper extremities  R60.0 782.3     Patient Active Problem List   Diagnosis   • Malignant neoplasm of overlapping sites of left breast in female, estrogen receptor positive (HCC)   • Family history of breast cancer in female   • Syncope   • Malignant neoplasm of overlapping sites of both breasts in female, estrogen receptor positive (HCC)   • Hypertension   • Encounter for long-term (current) use of other medications   • Drug-induced hepatitis   • Atrial fibrillation (HCC)   • Transaminitis   • Lymphedema of upper extremity, bilateral   • Rash   • Inflammatory arthritis   • Streptococcal arthritis of left ankle (HCC)   • New onset atrial fibrillation (HCC)     Past Medical History:   Diagnosis Date   • Anemia in neoplastic disease    • Arthritis    • Breast cancer (HCC)     Left   • CVA (cerebral vascular accident) (HCC)    • Hip pain     RIGHT HIP... CYST   • History of fracture of leg    • History of radiation therapy     LAST TREATMENT     • Hypertension    • Limited joint range of motion (ROM)     RIGHT HIP   • Skin sore     OPEN SORE LEFT BREAST   • Syncope    • Vertigo      Past Surgical History:   Procedure Laterality Date   • AXILLARY LYMPH NODE BIOPSY/EXCISION Right     LYMPH NODE UNDER RIGHT ARM-MALIGNANT (DOUBLE MASTECTOMY)   • BREAST BIOPSY Left     MALIGNANT   • FRACTURE SURGERY      Leg   • HARDWARE REMOVAL     • INCISION AND DRAINAGE LEG Left 2022    Procedure: INCISION AND DRAINAGE left ankle;  Surgeon: Kenneth Tran MD;   Location: [facility] MAIN OR;  Service: Orthopedics;  Laterality: Left;   • MASTECTOMY W/ SENTINEL NODE BIOPSY Bilateral 9/16/2019    Procedure: BILATERLA MODIFIED RADICAL MASTECTOMY WITH BILATERAL SENTINEL LYMPH NODE BIOPSY;  Surgeon: Joby Barron Jr., MD;  Location: [facility] MAIN OR;  Service: General   • TOTAL HIP ARTHROPLASTY Right 3/2/2020    Procedure: RIGHT TOTAL HIP ARTHROPLASTY NATALY NAVIGATION;  Surgeon: Luis M Leonard MD;  Location: [facility] MAIN OR;  Service: Orthopedics;  Laterality: Right;   • TOTAL HIP ARTHROPLASTY Left 7/20/2021    Procedure: Posterior LEFT TOTAL HIP ARTHROPLASTY NATALY NAVIGATION;  Surgeon: Luis M Leonard MD;  Location: [facility] MAIN OR;  Service: Orthopedics;  Laterality: Left;   • VENOUS ACCESS DEVICE (PORT) INSERTION Right 2/1/2019    Procedure: INSERTION VENOUS ACCESS DEVICE;  Surgeon: Joby Barron Jr., MD;  Location: [facility];  Service: General   • VENOUS ACCESS DEVICE (PORT) REMOVAL N/A 10/30/2019    Procedure: Mediport Removal;  Surgeon: Joby Barron Jr., MD;  Location: [facility] OR;  Service: General      General Information     Row Name 07/11/22 1536          Physical Therapy Time and Intention    Document Type therapy note (daily note)  -PH     Mode of Treatment physical therapy  -     Row Name 07/11/22 1536          General Information    Existing Precautions/Restrictions fall  -PH     Row Name 07/11/22 1536          Safety Issues, Functional Mobility    Impairments Affecting Function (Mobility) balance;endurance/activity tolerance;strength;pain  -PH     Comment, Safety Issues/Impairments (Mobility) gt belt and non skid socks worn for pt safety  -PH           User Key  (r) = Recorded By, (t) = Taken By, (c) = Cosigned By    Initials Name Provider Type    PH Amalia Yap PTA Physical Therapist Assistant               Mobility     Row Name 07/11/22 1537          Bed Mobility    Bed Mobility bed mobility (all) activities  -PH     All Activities,  [place] (Bed Mobility) modified independence  -PH     Comment, (Bed Mobility) pt reporting pain after earlier amb w/ staff  -PH     Row Name 07/11/22 1537          Sit-Stand Transfer    Sit-Stand [place] (Transfers) standby assist;verbal cues  -PH     Assistive Device (Sit-Stand Transfers) walker, front-wheeled  -PH     Comment, (Sit-Stand Transfer) Pt placing L foot flat on floor w/ extra time standing 2/2 to pain  -PH     Row Name 07/11/22 1537          Gait/Stairs (Locomotion)    [place] Level (Gait) standby assist  -PH     Assistive Device (Gait) walker, front-wheeled  -PH     Distance in Feet (Gait) 100'+  -PH     Deviations/Abnormal Patterns (Gait) antalgic;weight shifting decreased;jonnathan decreased;gait speed decreased;stride length decreased  -PH     Left Sided Gait Deviations heel strike decreased  -PH     [place] Level (Stairs) unable to assess  -PH     Comment, (Gait/Stairs) slow antalgic w/ no LOB; no heel strike although pt attempting pt place wt on foot w/ heavy use of  B UE noted.  -PH     Row Name 07/11/22 1537          Mobility    Extremity Weight-bearing Status left lower extremity  -PH     Left Lower Extremity (Weight-bearing Status) weight-bearing as tolerated (WBAT)  -PH           User Key  (r) = Recorded By, (t) = Taken By, (c) = Cosigned By    Initials Name Provider Type    PH Amalia Yap PTA Physical Therapist Assistant               Obj/Interventions     Row Name 07/11/22 1539          Motor Skills    Therapeutic Exercise other (see comments)  isometric resistance w/ use of gt belt - DF, PF, med and lat rotation  -PH     Row Name 07/11/22 1539          Balance    Balance Assessment sitting static balance;standing static balance  -PH     Static Sitting Balance standby assist  -PH     Static Standing Balance standby assist  -PH     Position/Device Used, Standing Balance walker, front-wheeled  -PH           User Key  (r) = Recorded By, (t) = Taken By, (c) =  Cosigned By    Initials Name Provider Type     Amalia Yap PTA Physical Therapist Assistant               Goals/Plan    No documentation.                Clinical Impression     Row Name 07/11/22 1542          Pain    Pretreatment Pain Rating 6/10  -PH     Posttreatment Pain Rating 6/10  -PH     Pain Location - Side/Orientation Left  -PH     Pain Location - ankle  -PH     Pain Intervention(s) Ambulation/increased activity;Rest;Elevated  -PH     Additional Documentation Pain Scale: Numbers Pre/Post-Treatment (Group)  -PH     Row Name 07/11/22 1542          Plan of Care Review    Plan of Care Reviewed With patient  -PH     Progress no change  -PH     Outcome Evaluation Pt agreeable to PT and sat up to EOB w/ mod I. Pt stood req SBA and use of fww w/ extra time taken standing w/ pt's L foot flat on floor although ankle/foot painful. Pt amb 150' w/ SBA and use of fww. Pt returned to bed req mod I where she performed isometric exercises using gt belt for L ankle. PT will prog as pt aayla.  -PH     Row Name 07/11/22 1542          Positioning and Restraints    Pre-Treatment Position in bed  -PH     Post Treatment Position bed  -PH     In Bed fowlers;call light within reach;encouraged to call for assist;exit alarm on  -PH           User Key  (r) = Recorded By, (t) = Taken By, (c) = Cosigned By    Initials Name Provider Type     Amalia Yap PTA Physical Therapist Assistant               Outcome Measures     Row Name 07/11/22 1544          How much help from another person do you currently need...    Turning from your back to your side while in flat bed without using bedrails? 4  -PH     Moving from lying on back to sitting on the side of a flat bed without bedrails? 4  -PH     Moving to and from a bed to a chair (including a wheelchair)? 3  -PH     Standing up from a chair using your arms (e.g., wheelchair, bedside chair)? 3  -PH     Climbing 3-5 steps with a railing? 2  -PH     To walk in hospital  room? 3  -PH     AM-PAC 6 Clicks Score (PT) 19  -     Highest level of mobility 6 --> Walked 10 steps or more  -     Row Name 07/11/22 1544          Functional Assessment    Outcome Measure Options AM-PAC 6 Clicks Basic Mobility (PT)  -           User Key  (r) = Recorded By, (t) = Taken By, (c) = Cosigned By    Initials Name Provider Type     Amalia Yap PTA Physical Therapist Assistant                             Physical Therapy Education                 Title: PT OT SLP Therapies (Done)     Topic: Physical Therapy (Done)     Point: Mobility training (Done)     Learning Progress Summary           Patient Acceptance, E,D, VU by  at 7/11/2022 1545      Show all documentation for this point (9)                 Point: Home exercise program (Done)     Learning Progress Summary           Patient Acceptance, E,D, VU by  at 7/11/2022 1545      Show all documentation for this point (5)                 Point: Body mechanics (Done)     Learning Progress Summary           Patient Acceptance, E,D, VU by  at 7/11/2022 1545      Show all documentation for this point (9)                 Point: Precautions (Done)     Learning Progress Summary           Patient Acceptance, E,D, VU by  at 7/11/2022 1545      Show all documentation for this point (8)                             User Key     Initials Effective Dates Name Provider Type Discipline     06/16/21 -  Amalia Yap PTA Physical Therapist Assistant PT              PT Recommendation and Plan     Plan of Care Reviewed With: patient  Progress: no change  Outcome Evaluation: Pt agreeable to PT and sat up to EOB w/ mod I. Pt stood req SBA and use of fww w/ extra time taken standing w/ pt's L foot flat on floor although ankle/foot painful. Pt amb 150' w/ SBA and use of fww. Pt returned to bed req mod I where she performed isometric exercises using gt belt for L ankle. PT will prog as pt ayala.     Time Calculation:    PT Charges     Row Name  07/11/22 1546             Time Calculation    Start Time 1519  -PH      Stop Time 1536  -PH      Time Calculation (min) 17 min  -PH      PT Received On 07/11/22  -PH      PT - Next Appointment 07/12/22  -PH              Timed Charges    43001 - PT Therapeutic Exercise Minutes 5  -PH      35897 - PT Therapeutic Activity Minutes 12  -PH              Total Minutes    Timed Charges Total Minutes 17  -PH       Total Minutes 17  -PH            User Key  (r) = Recorded By, (t) = Taken By, (c) = Cosigned By    Initials Name Provider Type    Amalia Johnson PTA Physical Therapist Assistant              Therapy Charges for Today     Code Description Service Date Service Provider Modifiers Qty    12347644667 HC PT THERAPEUTIC ACT EA 15 MIN 7/11/2022 Amalia Yap PTA GP 1          PT G-Codes  Outcome Measure Options: AM-PAC 6 Clicks Basic Mobility (PT)  AM-PAC 6 Clicks Score (PT): 19  AM-PAC 6 Clicks Score (OT): 10  Modified Susan Scale: 4 - Moderately severe disability.  Unable to walk without assistance, and unable to attend to own bodily needs without assistance.    Amalia Yap PTA  7/11/2022

## 2022-07-12 PROCEDURE — 25010000002 CEFTRIAXONE PER 250 MG: Performed by: HOSPITALIST

## 2022-07-12 RX ADMIN — Medication 10 ML: at 21:23

## 2022-07-12 RX ADMIN — DOCUSATE SODIUM 50MG AND SENNOSIDES 8.6MG 2 TABLET: 8.6; 5 TABLET, FILM COATED ORAL at 21:23

## 2022-07-12 RX ADMIN — TIZANIDINE 4 MG: 4 TABLET ORAL at 15:40

## 2022-07-12 RX ADMIN — HYDROCODONE BITARTRATE AND ACETAMINOPHEN 1 TABLET: 7.5; 325 TABLET ORAL at 15:40

## 2022-07-12 RX ADMIN — APIXABAN 5 MG: 5 TABLET, FILM COATED ORAL at 21:23

## 2022-07-12 RX ADMIN — TIZANIDINE 4 MG: 4 TABLET ORAL at 09:22

## 2022-07-12 RX ADMIN — FAMOTIDINE 20 MG: 20 TABLET ORAL at 18:01

## 2022-07-12 RX ADMIN — METOPROLOL TARTRATE 75 MG: 25 TABLET, FILM COATED ORAL at 21:22

## 2022-07-12 RX ADMIN — FAMOTIDINE 20 MG: 20 TABLET ORAL at 09:22

## 2022-07-12 RX ADMIN — LIDOCAINE 1 PATCH: 50 PATCH TOPICAL at 21:22

## 2022-07-12 RX ADMIN — APIXABAN 5 MG: 5 TABLET, FILM COATED ORAL at 09:22

## 2022-07-12 RX ADMIN — IBUPROFEN 200 MG: 200 TABLET, FILM COATED ORAL at 18:02

## 2022-07-12 RX ADMIN — HYDROCODONE BITARTRATE AND ACETAMINOPHEN 1 TABLET: 7.5; 325 TABLET ORAL at 06:12

## 2022-07-12 RX ADMIN — METOPROLOL TARTRATE 75 MG: 25 TABLET, FILM COATED ORAL at 09:22

## 2022-07-12 RX ADMIN — POLYETHYLENE GLYCOL 3350 17 G: 17 POWDER, FOR SOLUTION ORAL at 09:22

## 2022-07-12 RX ADMIN — EXEMESTANE 25 MG: 25 TABLET, FILM COATED ORAL at 09:22

## 2022-07-12 RX ADMIN — CEFTRIAXONE SODIUM 2 G: 2 INJECTION, POWDER, FOR SOLUTION INTRAMUSCULAR; INTRAVENOUS at 12:18

## 2022-07-12 RX ADMIN — DIGOXIN 125 MCG: 125 TABLET ORAL at 12:18

## 2022-07-12 RX ADMIN — Medication 10 ML: at 09:23

## 2022-07-12 RX ADMIN — POLYETHYLENE GLYCOL 3350 17 G: 17 POWDER, FOR SOLUTION ORAL at 21:22

## 2022-07-12 NOTE — PROGRESS NOTES
Name: Marylu Georges ADMIT: 2022   : 1960  PCP: Elie Dixon MD    MRN: [id number] LOS: 13 days   AGE/SEX: 62 y.o. female  ROOM: [facility]     Subjective   Subjective   No new complaints. Pain and ROM pain continues to improve. Some residual stiffness left hand. She walked 150 feet with physical therapy.    Review of Systems   Constitutional: Negative for fever.   Respiratory: Negative for cough and shortness of breath.    Cardiovascular: Negative for chest pain.   Gastrointestinal: Negative for abdominal pain.   Genitourinary: Negative for dysuria.   Neurological: Negative for headaches.      Objective   Objective   Vital Signs  Temp:  [97.5 °F (36.4 °C)-98.5 °F (36.9 °C)] 98 °F (36.7 °C)  Heart Rate:  [58-67] 58  Resp:  [18] 18  BP: ()/(56-74) 106/74     on   ;   Device (Oxygen Therapy): room air  Body mass index is 25.02 kg/m².    Physical Exam  Constitutional:       General: She is not in acute distress.     Appearance: Normal appearance. She is not toxic-appearing.   HENT:      Head: Normocephalic and atraumatic.   Cardiovascular:      Rate and Rhythm: Normal rate and regular rhythm.   Pulmonary:      Effort: Pulmonary effort is normal. No respiratory distress.      Breath sounds: Normal breath sounds. No wheezing or rhonchi.   Abdominal:      General: Bowel sounds are normal.      Palpations: Abdomen is soft.      Tenderness: There is no abdominal tenderness. There is no guarding or rebound.   Musculoskeletal:      Right lower leg: No edema.      Left lower leg: No edema.   Skin:     General: Skin is warm and dry.   Neurological:      General: No focal deficit present.      Mental Status: She is alert and oriented to person, place, and time.   Psychiatric:         Mood and Affect: Mood normal.         Behavior: Behavior normal.         Thought Content: Thought content normal.     Results Review  I reviewed the patient's new clinical results.  Results from last 7 days   Lab Units  07/09/22 0441 07/06/22  0554   WBC 10*3/mm3 4.34 5.45   HEMOGLOBIN g/dL 10.4* 11.6*   PLATELETS 10*3/mm3 428 468*     Results from last 7 days   Lab Units 07/09/22 0441 07/06/22  0554   SODIUM mmol/L 140 134*   POTASSIUM mmol/L 4.3 4.2   CHLORIDE mmol/L 103 101   CO2 mmol/L 26.0 25.5   BUN mg/dL 18 16   CREATININE mg/dL 0.80 0.61   GLUCOSE mg/dL 96 93     Lab Results   Component Value Date    ANIONGAP 11.0 07/09/2022     Estimated Creatinine Clearance: 78.8 mL/min (by C-G formula based on SCr of 0.8 mg/dL).    Results from last 7 days   Lab Units 07/06/22  0554   ALBUMIN g/dL 3.10*   BILIRUBIN mg/dL 0.5   ALK PHOS U/L 105   AST (SGOT) U/L 37*   ALT (SGPT) U/L 44*     Results from last 7 days   Lab Units 07/09/22 0441 07/06/22  0554   CALCIUM mg/dL 9.1 8.9   ALBUMIN g/dL  --  3.10*         Scheduled Meds  apixaban, 5 mg, Oral, Q12H  cefTRIAXone, 2 g, Intravenous, Q24H  clobetasol, 1 application, Topical, Q12H  digoxin, 125 mcg, Oral, Daily  exemestane, 25 mg, Oral, Daily  famotidine, 20 mg, Oral, BID AC  ibuprofen, 200 mg, Oral, Daily  lidocaine, 1 patch, Transdermal, Q24H  metoprolol tartrate, 75 mg, Oral, Q12H  polyethylene glycol, 17 g, Oral, BID  senna-docusate sodium, 2 tablet, Oral, Nightly  sodium chloride, 10 mL, Intravenous, Q12H    Continuous Infusions   PRN Meds  [DISCONTINUED] senna-docusate sodium **AND** [DISCONTINUED] polyethylene glycol **AND** bisacodyl **AND** bisacodyl  •  HYDROcodone-acetaminophen  •  nitroglycerin  •  ondansetron **OR** ondansetron  •  simethicone  •  [COMPLETED] Insert peripheral IV **AND** sodium chloride  •  sodium chloride  •  tiZANidine  •  traZODone     Diet  Diet Regular        Assessment/Plan     Active Hospital Problems    Diagnosis  POA   • **Streptococcal arthritis of left ankle (HCC) [M00.272]  Yes   • New onset atrial fibrillation (HCC) [I48.91]  Yes   • Inflammatory arthritis [M19.90]  Yes   • Atrial fibrillation (HCC) [I48.91]  Yes   • Transaminitis [R74.01]   Yes   • Lymphedema of upper extremity, bilateral [I89.0]  Yes   • Rash [R21]  Yes   • Malignant neoplasm of overlapping sites of left breast in female, estrogen receptor positive (HCC) [C50.812, Z17.0]  Not Applicable      Resolved Hospital Problems   No resolved problems to display.     62 y.o. female with a history of left-sided breast cancer status post bilateral mastectomy in 2019, stroke, hypertension presented with bilateral upper extremity swelling and left ankle swelling and rash. She also had new onset A. fib with RVR. Arthrocentesis of left ankle showed group C strep and she is now status post washout (6/30/22). MRI left shoulder showed synovitis. MRI of lumbar spine showed discitis and myositis. She also had left hand MCP septic arthritis. Aortic valve calcification with suspected endocarditis.    ID plans 6 weeks of IV Rocephin stop date August 10, 2022 with weekly labs. Follow-up ID August 10. PICC line placed.    New onset atrial fibrillation with RVR: Echocardiogram with PFO and severely dilated left atrial cavity. Digoxin, metoprolol, Eliquis.    SCDs for DVT prophylaxis. Adding scheduled bowel medications.    Discussed with patient, nursing staff and CCP    Discharge: Maybe doing too well for rehab. Patient would be fine with going home tomorrow with with home health or outpatient PT.    Hansel Colunga MD  [facility]  07/12/22

## 2022-07-12 NOTE — PLAN OF CARE
Goal Outcome Evaluation:  Plan of Care Reviewed With: patient        Progress: no change  Outcome Evaluation: precert approved-bed will be available tomorrow after 3. family to transport. VSS. continue to monitor

## 2022-07-12 NOTE — CASE MANAGEMENT/SOCIAL WORK
Continued Stay Note  [facility]     Patient Name: Marylu Georges  MRN: [id number]  Today's Date: 7/12/2022    Admit Date: 6/27/2022     Discharge Plan     Row Name 07/12/22 1554       Plan    Plan [facility] - precert obtained    Plan Comments Received call from Caitlyn with [facility], patient’s precert has been approved. Patient notified. Family to transport. Will continue to monitor for new or changing discharge needs.  Lea TILLMAN RN CCP    Row Name 07/12/22 5340       Plan    Plan Would like to go home with [facility] and [facility]    Plan Comments Met with patient at bedside to discuss discharge plans. Patient is currently able to walk 150 feet with PT.  Patient has strong desire to go home with home health. Patient would like [facility] and [facility] - spoke with Jane she will follow. Patient states she and her family will be able to manage the IV antibiotic infusions. Patient has elevated toilet seat, cane and walker. Family will be able transport. Meds to beds. Will continue to monitor for new or changing discharge needs.  Lea TILLMAN RN CCP               Discharge Codes    No documentation.               Expected Discharge Date and Time     Expected Discharge Date Expected Discharge Time    Jul 13, 2022             Lea Breen RN

## 2022-07-12 NOTE — PLAN OF CARE
Goal Outcome Evaluation:              Outcome Evaluation: VSS afebrile, medicated x1 for pain at bedtime. No other complaints, uneventful shift.

## 2022-07-12 NOTE — PAYOR COMM NOTE
"Rhonda Davis (62 y.o. Female)     U/D FOR [id number]    CONTACT KAYLA NAVARRETE  P# [phone]  F# [phone]              Date of Birth   1960    Social Security Number       Address   [address] [place] [address]    Home Phone   [phone]    MRN   [id number]       Adventism   Sabianist    Marital Status                               Admission Date   6/27/22    Admission Type   Emergency    Admitting Provider   Santhosh Miller MD    Attending Provider   Hansel Colunga MD    Department, Room/Bed   [address], E[address]/1       Discharge Date       Discharge Disposition       Discharge Destination                               Attending Provider: Hansel Colunga MD    Allergies: Benadryl [Diphenhydramine], Erythromycin, Levaquin [Levofloxacin], Penicillins    Isolation: None   Infection: None   Code Status: CPR   Advance Care Planning Activity    Ht: 167.6 cm (66\")   Wt: 70.3 kg (155 lb)    Admission Cmt: None   Principal Problem: 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                 Active Insurance as of 6/27/2022     Primary Coverage     Payor Plan Insurance Group Employer/Plan Group    [employer] BLUE [employer]OSS [employer]UE [employer]OSS BLUE [employer] PPO [id number]     Payor Plan Address Payor Plan Phone Number Payor Plan Fax Number Effective Dates    [address] [phone]  7/15/2009 - None Entered    [place] [address]       Subscriber Name Subscriber Birth Date Member ID       RHONDA DAVIS 1960 [id number]                 Emergency Contacts      (Rel.) Home Phone Work Phone Mobile Phone    FAMILIA DAVIS [facility] (Brother) [phone] -- [phone]    Cruz Landry (Spouse) [phone] -- [phone]               Physician Progress Notes (last 48 hours)      Kenneth Tran MD at 07/11/22 1044              Orthopedic Progress Note      Patient: Rhonda Davis    YOB: 1960    Medical Record " Number: [id number]    Attending Physician: Hansel Colunga MD    Date of Admission: 6/27/2022  6:51 AM    Admitting Dx:  Elevated C-reactive protein (CRP) [R79.82]  Elevated sed rate [R70.0]  Elevated LFTs [R79.89]  Edema of both upper extremities [R60.0]  Atrial fibrillation, unspecified type (HCC) [I48.91]  New onset atrial fibrillation (HCC) [I48.91]  Discitis Lumbar spine    Status Post: Irrigation debridement left ankle joint    Post Operative Day Number: 11    Current Problem List:   Patient Active Problem List   Diagnosis   • Malignant neoplasm of overlapping sites of left breast in female, estrogen receptor positive (HCC)   • Family history of breast cancer in female   • Syncope   • Malignant neoplasm of overlapping sites of both breasts in female, estrogen receptor positive (HCC)   • Hypertension   • Encounter for long-term (current) use of other medications   • Drug-induced hepatitis   • Atrial fibrillation (HCC)   • Transaminitis   • Lymphedema of upper extremity, bilateral   • Rash   • Inflammatory arthritis   • Streptococcal arthritis of left ankle (HCC)   • New onset atrial fibrillation (HCC)         Past Medical History:   Diagnosis Date   • Anemia in neoplastic disease    • Arthritis    • Breast cancer (HCC)     Left   • CVA (cerebral vascular accident) (HCC)    • Hip pain     RIGHT HIP... CYST   • History of fracture of leg 1987   • History of radiation therapy     LAST TREATMENT     • Hypertension    • Limited joint range of motion (ROM)     RIGHT HIP   • Skin sore     OPEN SORE LEFT BREAST   • Syncope    • Vertigo        Current Medications:  Scheduled Meds:apixaban, 5 mg, Oral, Q12H  cefTRIAXone, 2 g, Intravenous, Q24H  clobetasol, 1 application, Topical, Q12H  digoxin, 125 mcg, Oral, Daily  exemestane, 25 mg, Oral, Daily  famotidine, 20 mg, Oral, BID AC  ibuprofen, 200 mg, Oral, Daily  lidocaine, 1 patch, Transdermal, Q24H  metoprolol tartrate, 75 mg, Oral, Q12H  polyethylene glycol, 17  g, Oral, BID  senna-docusate sodium, 2 tablet, Oral, Nightly  sodium chloride, 10 mL, Intravenous, Q12H      PRN Meds:.[DISCONTINUED] senna-docusate sodium **AND** [DISCONTINUED] polyethylene glycol **AND** bisacodyl **AND** bisacodyl  •  HYDROcodone-acetaminophen  •  nitroglycerin  •  ondansetron **OR** ondansetron  •  simethicone  •  [COMPLETED] Insert peripheral IV **AND** sodium chloride  •  sodium chloride  •  tiZANidine  •  traZODone    SUBJECTIVE: 62 y.o.  female.  Awake and alert.  No complaints at present    OBJECTIVE:   Vitals:    07/10/22 2118 07/11/22 0005 07/11/22 0317 07/11/22 0743   BP:  96/73  116/99   BP Location:  Right arm  Right arm   Patient Position:  Lying  Lying   Pulse: 81 72 88 67   Resp:  18  18   Temp:  98.8 °F (37.1 °C)     TempSrc:  Oral  Oral   SpO2: 96% 95%  99%   Weight:       Height:         No intake/output data recorded.    Diagnostic Tests:   Lab Results (last 24 hours)       ** No results found for the last 24 hours. **            PHYSICAL EXAM: Incision to anterior left ankle is intact.  Dressing was changed today by nursing staff.  She has no erythema.  Still has some slight swelling in the left foot and ankle.  Capillary refill remains brisk.  Has good motion and sensation to her toes.  Ankle has limited range of motion due to stiffness and pain.  I have encouraged her to work on active range of motion of that left foot and ankle.  She is ambulating 100 feet.  Still complaining of pain in her back on occasion but her pain is well controlled with oral medication.  Patient only gets up once a day to walk.  She is walking to the bathroom several times throughout the day.  She has not been sitting up in the chair due to the chair causing her increased back pain.  Patient is still waiting on insurance approval to go to rehab.  Also complaining of some constipation due to her immobility and her narcotics.    ASSESSMENT & PLAN:  Have encouraged the patient that she needs to be more  mobile.  Would recommend that she try ambulating at least 2-3 times a day with staff or PT.  She also needs to work on range of motion of the left ankle.  Patient needs to return to the office in 4 weeks to see Dr. Meredith for repeat x-rays of her lumbar spine.  Sooner if need be.  Will plan to remove sutures from her left ankle later this week if she is still in the hospital    Date: 2022    Maria Isabel Tam RN        Follow up with us in 1 week for suture removal if she gets discharged within the next day or so.  Otherwise we will remove sutures.  Encouraged to continue therapy and work on ankle range of motion.    call for questions or concerns thank you    I have reviewed and agree with the above.    Kenneth Tran MD          Electronically signed by Kenneth Tran MD at 22 1205     Hansel Colunga MD at 22 0951              Name: Marylu Georges ADMIT: 2022   : 1960  PCP: Elie Dixon MD    MRN: [id number] LOS: 12 days   AGE/SEX: 62 y.o. female  ROOM: [facility]     Subjective   Subjective   No new complaints. Pain and ROM improved. Feels a little constipated asking for bowel medications to be scheduled.    Review of Systems   Constitutional: Negative for fever.   Respiratory: Negative for cough and shortness of breath.    Cardiovascular: Negative for chest pain.   Gastrointestinal: Negative for abdominal pain, diarrhea, nausea and vomiting.   Genitourinary: Negative for dysuria.   Neurological: Negative for headaches.     Objective   Objective   Vital Signs  Temp:  [97.8 °F (36.6 °C)-98.8 °F (37.1 °C)] 98.8 °F (37.1 °C)  Heart Rate:  [66-92] 67  Resp:  [18] 18  BP: ()/(70-99) 116/99  SpO2:  [94 %-100 %] 99 %  on   ;   Device (Oxygen Therapy): room air  Body mass index is 25.02 kg/m².    Physical Exam  Constitutional:       General: She is not in acute distress.     Appearance: Normal appearance. She is not toxic-appearing.   HENT:      Head: Normocephalic and  atraumatic.   Cardiovascular:      Rate and Rhythm: Normal rate and regular rhythm.   Pulmonary:      Effort: Pulmonary effort is normal. No respiratory distress.      Breath sounds: Normal breath sounds. No wheezing or rhonchi.   Abdominal:      General: Bowel sounds are normal.      Palpations: Abdomen is soft.      Tenderness: There is no abdominal tenderness. There is no guarding or rebound.   Musculoskeletal:      Right lower leg: No edema.      Left lower leg: No edema.   Skin:     General: Skin is warm and dry.   Neurological:      General: No focal deficit present.      Mental Status: She is alert and oriented to person, place, and time.   Psychiatric:         Mood and Affect: Mood normal.         Behavior: Behavior normal.         Thought Content: Thought content normal.     Results Review  I reviewed the patient's new clinical results.  Results from last 7 days   Lab Units 07/09/22 0441 07/06/22 0554 07/05/22  0554   WBC 10*3/mm3 4.34 5.45 6.00   HEMOGLOBIN g/dL 10.4* 11.6* 11.3*   PLATELETS 10*3/mm3 428 468* 415     Results from last 7 days   Lab Units 07/09/22 0441 07/06/22  0554 07/05/22  0554   SODIUM mmol/L 140 134* 135*   POTASSIUM mmol/L 4.3 4.2 4.5   CHLORIDE mmol/L 103 101 101   CO2 mmol/L 26.0 25.5 24.0   BUN mg/dL 18 16 14   CREATININE mg/dL 0.80 0.61 0.55*   GLUCOSE mg/dL 96 93 93     Lab Results   Component Value Date    ANIONGAP 11.0 07/09/2022     Estimated Creatinine Clearance: 78.8 mL/min (by C-G formula based on SCr of 0.8 mg/dL).    Results from last 7 days   Lab Units 07/06/22  0554 07/05/22  0554   ALBUMIN g/dL 3.10* 3.00*   BILIRUBIN mg/dL 0.5  --    ALK PHOS U/L 105  --    AST (SGOT) U/L 37*  --    ALT (SGPT) U/L 44*  --      Results from last 7 days   Lab Units 07/09/22 0441 07/06/22 0554 07/05/22  0554   CALCIUM mg/dL 9.1 8.9 8.7   ALBUMIN g/dL  --  3.10* 3.00*   PHOSPHORUS mg/dL  --   --  3.9         Scheduled Meds  apixaban, 5 mg, Oral, Q12H  cefTRIAXone, 2 g, Intravenous,  Q24H  clobetasol, 1 application, Topical, Q12H  digoxin, 125 mcg, Oral, Daily  exemestane, 25 mg, Oral, Daily  famotidine, 20 mg, Oral, BID AC  hydrOXYzine pamoate, 25 mg, Oral, TID  ibuprofen, 200 mg, Oral, Daily  lidocaine, 1 patch, Transdermal, Q24H  metoprolol tartrate, 75 mg, Oral, Q12H  polyethylene glycol, 17 g, Oral, BID  senna-docusate sodium, 2 tablet, Oral, Nightly  sodium chloride, 10 mL, Intravenous, Q12H    Continuous Infusions   PRN Meds  [DISCONTINUED] senna-docusate sodium **AND** [DISCONTINUED] polyethylene glycol **AND** bisacodyl **AND** bisacodyl  •  HYDROcodone-acetaminophen  •  nitroglycerin  •  ondansetron **OR** ondansetron  •  simethicone  •  [COMPLETED] Insert peripheral IV **AND** sodium chloride  •  sodium chloride  •  tiZANidine  •  traZODone     Diet  Diet Regular       Assessment/Plan     Active Hospital Problems    Diagnosis  POA   • **Streptococcal arthritis of left ankle (HCC) [M00.272]  Yes   • New onset atrial fibrillation (HCC) [I48.91]  Yes   • Inflammatory arthritis [M19.90]  Yes   • Atrial fibrillation (HCC) [I48.91]  Yes   • Transaminitis [R74.01]  Yes   • Lymphedema of upper extremity, bilateral [I89.0]  Yes   • Rash [R21]  Yes   • Malignant neoplasm of overlapping sites of left breast in female, estrogen receptor positive (HCC) [C50.812, Z17.0]  Not Applicable      Resolved Hospital Problems   No resolved problems to display.     62 y.o. female with a history of left-sided breast cancer status post bilateral mastectomy in 2019, stroke, hypertension presented with bilateral upper extremity swelling and left ankle swelling and rash. She also had new onset A. fib with RVR. Arthrocentesis of left ankle showed group C strep and she is now status post washout (6/30/22). MRI left shoulder showed synovitis. MRI of lumbar spine showed discitis and myositis. She also had left hand MCP septic arthritis. Aortic valve calcification with suspected endocarditis.    ID plans 6 weeks of IV  Rocephin stop date August 10, 2022 with weekly labs. Follow-up ID August 10. PICC line placed.    New onset atrial fibrillation with RVR: Echocardiogram with PFO and severely dilated left atrial cavity. Digoxin, metoprolol, Eliquis.    SCDs for DVT prophylaxis. Adding scheduled bowel medications.    Discussed with patient and nursing staff    Discharge: SNF awaiting precert    Hansel Colunga MD  [facility]  22      Electronically signed by Hansel Colunga MD at 22 0955     Hansel Colunga MD at 07/10/22 1404              Name: Marylu Georges ADMIT: 2022   : 1960  PCP: Elie Dixon MD    MRN: [id number] LOS: 11 days   AGE/SEX: 62 y.o. female  [facility]OM: [facility]     Subjective   Subjective   She feels like she is improving. Less swelling and pain.    Review of Systems   Constitutional: Negative for fever.   Respiratory: Negative for cough and shortness of breath.    Cardiovascular: Negative for chest pain.   Gastrointestinal: Negative for abdominal pain, diarrhea, nausea and vomiting.   Genitourinary: Negative for dysuria.   Musculoskeletal: Positive for arthralgias, back pain and joint swelling.   Neurological: Negative for headaches.     Objective   Objective   Vital Signs  Temp:  [97.9 °F (36.6 °C)-98.3 °F (36.8 °C)] 98.3 °F (36.8 °C)  Heart Rate:  [65-86] 67  Resp:  [18] 18  BP: ()/(59-83) 96/62  SpO2:  [97 %-99 %] 97 %  on   ;   Device (Oxygen Therapy): nasal cannula with reservoir (i.e. oximizer);room air  Body mass index is 25.02 kg/m².    Physical Exam  Constitutional:       General: She is not in acute distress.     Appearance: Normal appearance. She is not toxic-appearing.   HENT:      Head: Normocephalic and atraumatic.   Cardiovascular:      Rate and Rhythm: Normal rate and regular rhythm.   Pulmonary:      Effort: Pulmonary effort is normal. No respiratory distress.      Breath sounds: Normal breath sounds. No wheezing or rhonchi.   Abdominal:       General: Bowel sounds are normal.      Palpations: Abdomen is soft.      Tenderness: There is no abdominal tenderness. There is no guarding or rebound.   Musculoskeletal:      Right lower leg: No edema.      Left lower leg: No edema.   Skin:     General: Skin is warm and dry.   Neurological:      General: No focal deficit present.      Mental Status: She is alert and oriented to person, place, and time.   Psychiatric:         Mood and Affect: Mood normal.         Behavior: Behavior normal.         Thought Content: Thought content normal.     Results Review  I reviewed the patient's new clinical results.  Results from last 7 days   Lab Units 07/09/22 0441 07/06/22 0554 07/05/22 0554   WBC 10*3/mm3 4.34 5.45 6.00   HEMOGLOBIN g/dL 10.4* 11.6* 11.3*   PLATELETS 10*3/mm3 428 468* 415     Results from last 7 days   Lab Units 07/09/22 0441 07/06/22 0554 07/05/22 0554 07/04/22 0418   SODIUM mmol/L 140 134* 135*  --    POTASSIUM mmol/L 4.3 4.2 4.5 5.0   CHLORIDE mmol/L 103 101 101  --    CO2 mmol/L 26.0 25.5 24.0  --    BUN mg/dL 18 16 14  --    CREATININE mg/dL 0.80 0.61 0.55*  --    GLUCOSE mg/dL 96 93 93  --      Lab Results   Component Value Date    ANIONGAP 11.0 07/09/2022     Estimated Creatinine Clearance: 78.8 mL/min (by C-G formula based on SCr of 0.8 mg/dL).    Results from last 7 days   Lab Units 07/06/22 0554 07/05/22 0554   ALBUMIN g/dL 3.10* 3.00*   BILIRUBIN mg/dL 0.5  --    ALK PHOS U/L 105  --    AST (SGOT) U/L 37*  --    ALT (SGPT) U/L 44*  --      Results from last 7 days   Lab Units 07/09/22 0441 07/06/22 0554 07/05/22 0554 07/04/22  0418   CALCIUM mg/dL 9.1 8.9 8.7  --    ALBUMIN g/dL  --  3.10* 3.00*  --    MAGNESIUM mg/dL  --   --   --  2.1   PHOSPHORUS mg/dL  --   --  3.9  --        No results found for: HGBA1C, POCGLU    Scheduled Meds  apixaban, 5 mg, Oral, Q12H  cefTRIAXone, 2 g, Intravenous, Q24H  clobetasol, 1 application, Topical, Q12H  digoxin, 125 mcg, Oral,  Daily  exemestane, 25 mg, Oral, Daily  famotidine, 20 mg, Oral, BID AC  hydrOXYzine pamoate, 25 mg, Oral, TID  ibuprofen, 200 mg, Oral, Daily  lidocaine, 1 patch, Transdermal, Q24H  metoprolol tartrate, 75 mg, Oral, Q12H  sodium chloride, 10 mL, Intravenous, Q12H    Continuous Infusions   PRN Meds  senna-docusate sodium **AND** polyethylene glycol **AND** bisacodyl **AND** bisacodyl  •  HYDROcodone-acetaminophen  •  nitroglycerin  •  ondansetron **OR** ondansetron  •  simethicone  •  [COMPLETED] Insert peripheral IV **AND** sodium chloride  •  sodium chloride  •  tiZANidine  •  traZODone     Diet  Diet Regular       Assessment/Plan     Active Hospital Problems    Diagnosis  POA   • **Streptococcal arthritis of left ankle (HCC) [M00.272]  Yes   • New onset atrial fibrillation (HCC) [I48.91]  Yes   • Inflammatory arthritis [M19.90]  Yes   • Atrial fibrillation (HCC) [I48.91]  Yes   • Transaminitis [R74.01]  Yes   • Lymphedema of upper extremity, bilateral [I89.0]  Yes   • Rash [R21]  Yes   • Malignant neoplasm of overlapping sites of left breast in female, estrogen receptor positive (HCC) [C50.812, Z17.0]  Not Applicable      Resolved Hospital Problems   No resolved problems to display.     62 y.o. female with a history of left-sided breast cancer status post bilateral mastectomy in 2019, stroke, hypertension presented with bilateral upper extremity swelling and left ankle swelling and rash. She also had new onset A. fib with RVR. Arthrocentesis of left ankle showed group C strep and she is now status post washout (6/30/22). MRI left shoulder showed synovitis. MRI of lumbar spine showed discitis and myositis. She also had left hand MCP septic arthritis. Aortic valve calcification with suspected endocarditis.    ID plans 6 weeks of IV Rocephin stop date August 10, 2022 with weekly labs. Follow-up ID August 10. PICC line has been ordered.     New onset atrial fibrillation with RVR: Echocardiogram with PFO and severely  dilated left atrial cavity. Digoxin, metoprolol, Eliquis.    SCDs for DVT prophylaxis    Discussed with patient and nursing staff    Discharge: SNF awaiting precert    Hansel Colunga MD  [facility]  07/10/22      Electronically signed by Hansel Colunga MD at 07/10/22 1405       Consult Notes (last 48 hours)  Notes from 07/10/22 1228 through 22 1228   No notes of this type exist for this encounter.          Physical Therapy Notes (last 48 hours)      Amalia Yap PTA at 22 1545  Version 1 of 1       Goal Outcome Evaluation:  Plan of Care Reviewed With: patient        Progress: no change  Outcome Evaluation: Pt agreeable to PT and sat up to EOB w/ mod I. Pt stood req SBA and use of fww w/ extra time taken standing w/ pt's L foot flat on floor although ankle/foot painful. Pt amb 150' w/ SBA and use of fww. Pt returned to bed req mod I where she performed isometric exercises using gt belt for L ankle. PT will prog as pt ayala.    Patient was intermittently wearing a face mask during this therapy encounter. Therapist used appropriate personal protective equipment including mask and gloves.  Mask used was standard procedure mask. Appropriate PPE was worn during the entire therapy session. Hand hygiene was completed before and after therapy session. Patient is not in enhanced droplet precautions.      Electronically signed by Amalia Yap PTA at 22 1546     Amalia Yap PTA at 22 1546  Version 1 of 1         Patient Name: Marylu Georges  : 1960    MRN: [id number]                              Today's Date: 2022       Admit Date: 2022    Visit Dx:     ICD-10-CM ICD-9-CM   1. Streptococcal arthritis of left ankle (HCC)  M00.272 711.07     041.00   2. Atrial fibrillation, unspecified type (HCC)  I48.91 427.31   3. Elevated LFTs  R79.89 790.6   4. Elevated C-reactive protein (CRP)  R79.82 790.95   5. Elevated sed rate  R70.0  790.1   6. Edema of both upper extremities  R60.0 782.3     Patient Active Problem List   Diagnosis   • Malignant neoplasm of overlapping sites of left breast in female, estrogen receptor positive (HCC)   • Family history of breast cancer in female   • Syncope   • Malignant neoplasm of overlapping sites of both breasts in female, estrogen receptor positive (HCC)   • Hypertension   • Encounter for long-term (current) use of other medications   • Drug-induced hepatitis   • Atrial fibrillation (HCC)   • Transaminitis   • Lymphedema of upper extremity, bilateral   • Rash   • Inflammatory arthritis   • Streptococcal arthritis of left ankle (HCC)   • New onset atrial fibrillation (HCC)     Past Medical History:   Diagnosis Date   • Anemia in neoplastic disease    • Arthritis    • Breast cancer (HCC)     Left   • CVA (cerebral vascular accident) (HCC)    • Hip pain     RIGHT HIP... CYST   • History of fracture of leg 1987   • History of radiation therapy     LAST TREATMENT     • Hypertension    • Limited joint range of motion (ROM)     RIGHT HIP   • Skin sore     OPEN SORE LEFT BREAST   • Syncope    • Vertigo      Past Surgical History:   Procedure Laterality Date   • AXILLARY LYMPH NODE BIOPSY/EXCISION Right     LYMPH NODE UNDER RIGHT ARM-MALIGNANT (DOUBLE MASTECTOMY)   • BREAST BIOPSY Left     MALIGNANT   • FRACTURE SURGERY  1987    Leg   • HARDWARE REMOVAL     • INCISION AND DRAINAGE LEG Left 6/30/2022    Procedure: INCISION AND DRAINAGE left ankle;  Surgeon: Kenneth Tran MD;  Location: [facility];  Service: Orthopedics;  Laterality: Left;   • MASTECTOMY W/ SENTINEL NODE BIOPSY Bilateral 9/16/2019    Procedure: BILATERLA MODIFIED RADICAL MASTECTOMY WITH BILATERAL SENTINEL LYMPH NODE BIOPSY;  Surgeon: Joby Barron Jr., MD;  Location: [facility];  Service: General   • TOTAL HIP ARTHROPLASTY Right 3/2/2020    Procedure: RIGHT TOTAL HIP ARTHROPLASTY NATALY NAVIGATION;  Surgeon: Luis M Leonard MD;   Location: [facility] MAIN OR;  Service: Orthopedics;  Laterality: Right;   • TOTAL HIP ARTHROPLASTY Left 7/20/2021    Procedure: Posterior LEFT TOTAL HIP ARTHROPLASTY NATALY NAVIGATION;  Surgeon: Luis M Leonard MD;  Location: [facility] MAIN OR;  Service: Orthopedics;  Laterality: Left;   • VENOUS ACCESS DEVICE (PORT) INSERTION Right 2/1/2019    Procedure: INSERTION VENOUS ACCESS DEVICE;  Surgeon: Joby Barron Jr., MD;  Location: [facility] OR [facility];  Service: General   • VENOUS ACCESS DEVICE (PORT) REMOVAL N/A 10/30/2019    Procedure: Mediport Removal;  Surgeon: Joby Barron Jr., MD;  Location: [facility] MAIN OR;  Service: General      General Information     Row Name 07/11/22 1536          Physical Therapy Time and Intention    Document Type therapy note (daily note)  -PH     Mode of Treatment physical therapy  -     Row Name 07/11/22 1536          General Information    Existing Precautions/Restrictions fall  -PH     Row Name 07/11/22 1536          Safety Issues, Functional Mobility    Impairments Affecting Function (Mobility) balance;endurance/activity tolerance;strength;pain  -PH     Comment, Safety Issues/Impairments (Mobility) gt belt and non skid socks worn for pt safety  -PH           User Key  (r) = Recorded By, (t) = Taken By, (c) = Cosigned By    Initials Name Provider Type    PH Amalia Yap PTA Physical Therapist Assistant               Mobility     Row Name 07/11/22 1537          Bed Mobility    Bed Mobility bed mobility (all) activities  -PH     All Activities, [place] (Bed Mobility) modified independence  -PH     Comment, (Bed Mobility) pt reporting pain after earlier amb w/ staff  -PH     Row Name 07/11/22 1537          Sit-Stand Transfer    Sit-Stand [place] (Transfers) standby assist;verbal cues  -PH     Assistive Device (Sit-Stand Transfers) walker, front-wheeled  -PH     Comment, (Sit-Stand Transfer) Pt placing L foot flat on floor w/ extra time standing 2/2 to pain  -PH     Row  Name 07/11/22 1537          Gait/Stairs (Locomotion)    [place] Level (Gait) standby assist  -PH     Assistive Device (Gait) walker, front-wheeled  -PH     Distance in Feet (Gait) 100'+  -PH     Deviations/Abnormal Patterns (Gait) antalgic;weight shifting decreased;jonnathan decreased;gait speed decreased;stride length decreased  -PH     Left Sided Gait Deviations heel strike decreased  -PH     [place] Level (Stairs) unable to assess  -PH     Comment, (Gait/Stairs) slow antalgic w/ no LOB; no heel strike although pt attempting pt place wt on foot w/ heavy use of  B UE noted.  -PH     Row Name 07/11/22 1537          Mobility    Extremity Weight-bearing Status left lower extremity  -PH     Left Lower Extremity (Weight-bearing Status) weight-bearing as tolerated (WBAT)  -PH           User Key  (r) = Recorded By, (t) = Taken By, (c) = Cosigned By    Initials Name Provider Type     Amalia Yap PTA Physical Therapist Assistant               Obj/Interventions     Row Name 07/11/22 1539          Motor Skills    Therapeutic Exercise other (see comments)  isometric resistance w/ use of gt belt - DF, PF, med and lat rotation  -PH     Row Name 07/11/22 1539          Balance    Balance Assessment sitting static balance;standing static balance  -PH     Static Sitting Balance standby assist  -PH     Static Standing Balance standby assist  -PH     Position/Device Used, Standing Balance walker, front-wheeled  -PH           User Key  (r) = Recorded By, (t) = Taken By, (c) = Cosigned By    Initials Name Provider Type     Amalia Yap PTA Physical Therapist Assistant               Goals/Plan    No documentation.                Clinical Impression     Row Name 07/11/22 1542          Pain    Pretreatment Pain Rating 6/10  -PH     Posttreatment Pain Rating 6/10  -PH     Pain Location - Side/Orientation Left  -PH     Pain Location - ankle  -PH     Pain Intervention(s) Ambulation/increased  activity;Rest;Elevated  -PH     Additional Documentation Pain Scale: Numbers Pre/Post-Treatment (Group)  -PH     Row Name 07/11/22 1542          Plan of Care Review    Plan of Care Reviewed With patient  -PH     Progress no change  -PH     Outcome Evaluation Pt agreeable to PT and sat up to EOB w/ mod I. Pt stood req SBA and use of fww w/ extra time taken standing w/ pt's L foot flat on floor although ankle/foot painful. Pt amb 150' w/ SBA and use of fww. Pt returned to bed req mod I where she performed isometric exercises using gt belt for L ankle. PT will prog as pt ayala.  -PH     Row Name 07/11/22 1542          Positioning and Restraints    Pre-Treatment Position in bed  -PH     Post Treatment Position bed  -PH     In Bed fowlers;call light within reach;encouraged to call for assist;exit alarm on  -PH           User Key  (r) = Recorded By, (t) = Taken By, (c) = Cosigned By    Initials Name Provider Type     Amalia Yap PTA Physical Therapist Assistant               Outcome Measures     Row Name 07/11/22 1548          How much help from another person do you currently need...    Turning from your back to your side while in flat bed without using bedrails? 4  -PH     Moving from lying on back to sitting on the side of a flat bed without bedrails? 4  -PH     Moving to and from a bed to a chair (including a wheelchair)? 3  -PH     Standing up from a chair using your arms (e.g., wheelchair, bedside chair)? 3  -PH     Climbing 3-5 steps with a railing? 2  -PH     To walk in hospital room? 3  -PH     AM-PAC 6 Clicks Score (PT) 19  -PH     Highest level of mobility 6 --> Walked 10 steps or more  -PH     Row Name 07/11/22 1546          Functional Assessment    Outcome Measure Options AM-PAC 6 Clicks Basic Mobility (PT)  -PH           User Key  (r) = Recorded By, (t) = Taken By, (c) = Cosigned By    Initials Name Provider Type    Amalia Johnson PTA Physical Therapist Assistant                              Physical Therapy Education                 Title: PT OT SLP Therapies (Done)     Topic: Physical Therapy (Done)     Point: Mobility training (Done)     Learning Progress Summary           Patient Acceptance, E,D, VU by  at 7/11/2022 1545      Show all documentation for this point (9)                 Point: Home exercise program (Done)     Learning Progress Summary           Patient Acceptance, E,D, VU by  at 7/11/2022 1545      Show all documentation for this point (5)                 Point: Body mechanics (Done)     Learning Progress Summary           Patient Acceptance, E,D, VU by  at 7/11/2022 1545      Show all documentation for this point (9)                 Point: Precautions (Done)     Learning Progress Summary           Patient Acceptance, E,D, VU by  at 7/11/2022 1545      Show all documentation for this point (8)                             User Key     Initials Effective Dates Name Provider Type Discipline     06/16/21 -  Amalia Yap PTA Physical Therapist Assistant PT              PT Recommendation and Plan     Plan of Care Reviewed With: patient  Progress: no change  Outcome Evaluation: Pt agreeable to PT and sat up to EOB w/ mod I. Pt stood req SBA and use of fww w/ extra time taken standing w/ pt's L foot flat on floor although ankle/foot painful. Pt amb 150' w/ SBA and use of fww. Pt returned to bed req mod I where she performed isometric exercises using gt belt for L ankle. PT will prog as pt ayala.     Time Calculation:    PT Charges     Row Name 07/11/22 1546             Time Calculation    Start Time 1519  -PH      Stop Time 1536  -PH      Time Calculation (min) 17 min  -PH      PT Received On 07/11/22  -      PT - Next Appointment 07/12/22  -PH              Timed Charges    58670 - PT Therapeutic Exercise Minutes 5  -PH      86562 - PT Therapeutic Activity Minutes 12  -PH              Total Minutes    Timed Charges Total Minutes 17  -PH       Total Minutes 17  -PH             User Key  (r) = Recorded By, (t) = Taken By, (c) = Cosigned By    Initials Name Provider Type     Amalia Yap PTA Physical Therapist Assistant              Therapy Charges for Today     Code Description Service Date Service Provider Modifiers Qty    [id number]  PT THERAPEUTIC ACT EA 15 MIN 7/11/2022 Amalia Yap PTA GP 1          PT G-Codes  Outcome Measure Options: AM-PAC 6 Clicks Basic Mobility (PT)  AM-PAC 6 Clicks Score (PT): 19  AM-PAC 6 Clicks Score (OT): 10  Modified [place] Scale: 4 - Moderately severe disability.  Unable to walk without assistance, and unable to attend to own bodily needs without assistance.    Amalia Yap PTA  7/11/2022      Electronically signed by Amalia Yap PTA at 07/11/22 9487

## 2022-07-12 NOTE — CASE MANAGEMENT/SOCIAL WORK
Continued Stay Note  [facility]     Patient Name: Marylu Georges  MRN: [id number]  Today's Date: 7/12/2022    Admit Date: 6/27/2022     Discharge Plan     Row Name 07/12/22 1455       Plan    Plan Would like to go home with [facility] and [facility]    Plan Comments Met with patient at bedside to discuss discharge plans. Patient is currently able to walk 150 feet with PT.  Patient has strong desire to go home with home health. Patient would like [facility] and [facility] - spoke with Jane she will follow. Patient states she and her family will be able to manage the IV antibiotic infusions. Patient has elevated toilet seat, cane and walker. Family will be able transport. Meds to beds. Will continue to monitor for new or changing discharge needs.  Lea TILLMAN RN CCP               Discharge Codes    No documentation.               Expected Discharge Date and Time     Expected Discharge Date Expected Discharge Time    Jul 12, 2022             Lea rBeen RN

## 2022-07-13 ENCOUNTER — HOME HEALTH ADMISSION (OUTPATIENT)
Dept: HOME HEALTH SERVICES | Facility: HOME HEALTHCARE | Age: 62
End: 2022-07-13

## 2022-07-13 VITALS
BODY MASS INDEX: 24.91 KG/M2 | OXYGEN SATURATION: 99 % | WEIGHT: 155 LBS | DIASTOLIC BLOOD PRESSURE: 69 MMHG | HEART RATE: 56 BPM | HEIGHT: 66 IN | SYSTOLIC BLOOD PRESSURE: 125 MMHG | TEMPERATURE: 97.5 F | RESPIRATION RATE: 18 BRPM

## 2022-07-13 LAB — SARS-COV-2 ORF1AB RESP QL NAA+PROBE: NOT DETECTED

## 2022-07-13 PROCEDURE — 99024 POSTOP FOLLOW-UP VISIT: CPT | Performed by: ORTHOPAEDIC SURGERY

## 2022-07-13 PROCEDURE — 97110 THERAPEUTIC EXERCISES: CPT

## 2022-07-13 PROCEDURE — 25010000002 CEFTRIAXONE PER 250 MG: Performed by: HOSPITALIST

## 2022-07-13 PROCEDURE — U0004 COV-19 TEST NON-CDC HGH THRU: HCPCS | Performed by: HOSPITALIST

## 2022-07-13 PROCEDURE — 97535 SELF CARE MNGMENT TRAINING: CPT

## 2022-07-13 RX ORDER — LIDOCAINE 50 MG/G
1 PATCH TOPICAL
Status: CANCELLED | OUTPATIENT
Start: 2022-07-13

## 2022-07-13 RX ORDER — ONDANSETRON 2 MG/ML
4 INJECTION INTRAMUSCULAR; INTRAVENOUS EVERY 6 HOURS PRN
Status: CANCELLED | OUTPATIENT
Start: 2022-07-13

## 2022-07-13 RX ORDER — DIGOXIN 125 MCG
125 TABLET ORAL
Status: CANCELLED | OUTPATIENT
Start: 2022-07-13

## 2022-07-13 RX ORDER — SIMETHICONE 80 MG
80 TABLET,CHEWABLE ORAL 4 TIMES DAILY PRN
Start: 2022-07-13 | End: 2022-09-21 | Stop reason: HOSPADM

## 2022-07-13 RX ORDER — METOPROLOL TARTRATE 75 MG/1
75 TABLET, FILM COATED ORAL EVERY 12 HOURS SCHEDULED
Start: 2022-07-13 | End: 2022-09-15 | Stop reason: ALTCHOICE

## 2022-07-13 RX ORDER — SODIUM CHLORIDE 0.9 % (FLUSH) 0.9 %
10 SYRINGE (ML) INJECTION EVERY 12 HOURS SCHEDULED
Status: CANCELLED | OUTPATIENT
Start: 2022-07-13

## 2022-07-13 RX ORDER — POLYETHYLENE GLYCOL 3350 17 G/17G
17 POWDER, FOR SOLUTION ORAL 2 TIMES DAILY
Status: CANCELLED | OUTPATIENT
Start: 2022-07-13

## 2022-07-13 RX ORDER — AMOXICILLIN 250 MG
2 CAPSULE ORAL NIGHTLY
Status: CANCELLED | OUTPATIENT
Start: 2022-07-13

## 2022-07-13 RX ORDER — SIMETHICONE 80 MG
80 TABLET,CHEWABLE ORAL 4 TIMES DAILY PRN
Status: CANCELLED | OUTPATIENT
Start: 2022-07-13

## 2022-07-13 RX ORDER — TRAZODONE HYDROCHLORIDE 50 MG/1
50 TABLET ORAL NIGHTLY PRN
Status: CANCELLED | OUTPATIENT
Start: 2022-07-13

## 2022-07-13 RX ORDER — IBUPROFEN 200 MG
200 TABLET ORAL DAILY
Status: CANCELLED | OUTPATIENT
Start: 2022-07-13

## 2022-07-13 RX ORDER — BISACODYL 10 MG
10 SUPPOSITORY, RECTAL RECTAL DAILY PRN
Status: CANCELLED | OUTPATIENT
Start: 2022-07-13

## 2022-07-13 RX ORDER — TIZANIDINE 4 MG/1
4 TABLET ORAL EVERY 6 HOURS PRN
Start: 2022-07-13

## 2022-07-13 RX ORDER — SODIUM CHLORIDE 0.9 % (FLUSH) 0.9 %
10 SYRINGE (ML) INJECTION AS NEEDED
Status: CANCELLED | OUTPATIENT
Start: 2022-07-13

## 2022-07-13 RX ORDER — CLOBETASOL PROPIONATE 0.5 MG/G
1 CREAM TOPICAL EVERY 12 HOURS SCHEDULED
Status: CANCELLED | OUTPATIENT
Start: 2022-07-13

## 2022-07-13 RX ORDER — FAMOTIDINE 20 MG/1
20 TABLET, FILM COATED ORAL
Start: 2022-07-13 | End: 2022-12-06 | Stop reason: ALTCHOICE

## 2022-07-13 RX ORDER — NITROGLYCERIN 0.4 MG/1
0.4 TABLET SUBLINGUAL
Status: CANCELLED | OUTPATIENT
Start: 2022-07-13

## 2022-07-13 RX ORDER — BISACODYL 5 MG/1
5 TABLET, DELAYED RELEASE ORAL DAILY PRN
Start: 2022-07-13 | End: 2022-08-16

## 2022-07-13 RX ORDER — TIZANIDINE 4 MG/1
4 TABLET ORAL EVERY 6 HOURS PRN
Status: CANCELLED | OUTPATIENT
Start: 2022-07-13

## 2022-07-13 RX ORDER — BISACODYL 10 MG
10 SUPPOSITORY, RECTAL RECTAL DAILY PRN
Start: 2022-07-13 | End: 2022-08-11

## 2022-07-13 RX ORDER — HYDROCODONE BITARTRATE AND ACETAMINOPHEN 7.5; 325 MG/1; MG/1
1 TABLET ORAL EVERY 4 HOURS PRN
Refills: 0
Start: 2022-07-13 | End: 2022-07-16

## 2022-07-13 RX ORDER — FAMOTIDINE 20 MG/1
20 TABLET, FILM COATED ORAL
Status: CANCELLED | OUTPATIENT
Start: 2022-07-13

## 2022-07-13 RX ORDER — ONDANSETRON 4 MG/1
4 TABLET, FILM COATED ORAL EVERY 6 HOURS PRN
Status: CANCELLED | OUTPATIENT
Start: 2022-07-13

## 2022-07-13 RX ORDER — CLOBETASOL PROPIONATE 0.5 MG/G
1 CREAM TOPICAL EVERY 12 HOURS SCHEDULED
Start: 2022-07-13 | End: 2022-08-11

## 2022-07-13 RX ORDER — POLYETHYLENE GLYCOL 3350 17 G/17G
17 POWDER, FOR SOLUTION ORAL 2 TIMES DAILY
Start: 2022-07-13

## 2022-07-13 RX ORDER — AMOXICILLIN 250 MG
2 CAPSULE ORAL NIGHTLY
Start: 2022-07-13 | End: 2022-08-16

## 2022-07-13 RX ORDER — LIDOCAINE 50 MG/G
1 PATCH TOPICAL
Start: 2022-07-13 | End: 2022-08-16

## 2022-07-13 RX ORDER — IBUPROFEN 200 MG
200 TABLET ORAL DAILY
Start: 2022-07-13 | End: 2022-08-16

## 2022-07-13 RX ORDER — TRAZODONE HYDROCHLORIDE 50 MG/1
50 TABLET ORAL NIGHTLY PRN
Start: 2022-07-13 | End: 2022-08-16

## 2022-07-13 RX ORDER — DIGOXIN 125 MCG
125 TABLET ORAL
Start: 2022-07-13 | End: 2022-09-21

## 2022-07-13 RX ORDER — BISACODYL 5 MG/1
5 TABLET, DELAYED RELEASE ORAL DAILY PRN
Status: CANCELLED | OUTPATIENT
Start: 2022-07-13

## 2022-07-13 RX ADMIN — POLYETHYLENE GLYCOL 3350 17 G: 17 POWDER, FOR SOLUTION ORAL at 09:02

## 2022-07-13 RX ADMIN — DIGOXIN 125 MCG: 125 TABLET ORAL at 12:05

## 2022-07-13 RX ADMIN — HYDROCODONE BITARTRATE AND ACETAMINOPHEN 1 TABLET: 7.5; 325 TABLET ORAL at 06:20

## 2022-07-13 RX ADMIN — APIXABAN 5 MG: 5 TABLET, FILM COATED ORAL at 09:02

## 2022-07-13 RX ADMIN — TIZANIDINE 4 MG: 4 TABLET ORAL at 06:23

## 2022-07-13 RX ADMIN — TIZANIDINE 4 MG: 4 TABLET ORAL at 12:05

## 2022-07-13 RX ADMIN — CEFTRIAXONE SODIUM 2 G: 2 INJECTION, POWDER, FOR SOLUTION INTRAMUSCULAR; INTRAVENOUS at 11:25

## 2022-07-13 RX ADMIN — FAMOTIDINE 20 MG: 20 TABLET ORAL at 06:20

## 2022-07-13 RX ADMIN — EXEMESTANE 25 MG: 25 TABLET, FILM COATED ORAL at 09:02

## 2022-07-13 RX ADMIN — Medication 10 ML: at 09:02

## 2022-07-13 RX ADMIN — HYDROCODONE BITARTRATE AND ACETAMINOPHEN 1 TABLET: 7.5; 325 TABLET ORAL at 12:05

## 2022-07-13 RX ADMIN — METOPROLOL TARTRATE 75 MG: 25 TABLET, FILM COATED ORAL at 09:01

## 2022-07-13 NOTE — PLAN OF CARE
Goal Outcome Evaluation:  Plan of Care Reviewed With: patient           Outcome Evaluation: OT-Pt. reports L ankle pain 7/10 when standing or moving. Decreased pain at rest. UE therap ex. completed seated to increase activity tolerance for self care. LE dressing SBA seated EOB. Reviewed safety using wx for functional mobility. Ambulated to BR with CGA using rolling wx. Pt. has an elevated commode seat close to the tub shower and states she has a system to complete bathing that works for her. D/c to [facility] for short term rehab soon.  Patient was not wearing a face mask during this therapy encounter. Therapist used appropriate personal protective equipment including mask, gloves, and eye protection.  Mask used was standard procedure mask. Appropriate PPE was worn during the entire therapy session. Hand hygiene was completed before and after therapy session. Patient is not in enhanced droplet precautions.

## 2022-07-13 NOTE — THERAPY TREATMENT NOTE
Patient Name: Marylu Georges  : 1960    MRN: [id number]                              Today's Date: 2022       Admit Date: 2022    Visit Dx:     ICD-10-CM ICD-9-CM   1. Streptococcal arthritis of left ankle (HCC)  M00.272 711.07     041.00   2. Atrial fibrillation, unspecified type (HCC)  I48.91 427.31   3. Elevated LFTs  R79.89 790.6   4. Elevated C-reactive protein (CRP)  R79.82 790.95   5. Elevated sed rate  R70.0 790.1   6. Edema of both upper extremities  R60.0 782.3     Patient Active Problem List   Diagnosis   • Malignant neoplasm of overlapping sites of left breast in female, estrogen receptor positive (HCC)   • Family history of breast cancer in female   • Syncope   • Malignant neoplasm of overlapping sites of both breasts in female, estrogen receptor positive (HCC)   • Hypertension   • Encounter for long-term (current) use of other medications   • Drug-induced hepatitis   • Atrial fibrillation (HCC)   • Transaminitis   • Lymphedema of upper extremity, bilateral   • Rash   • Inflammatory arthritis   • Streptococcal arthritis of left ankle (HCC)   • New onset atrial fibrillation (HCC)     Past Medical History:   Diagnosis Date   • Anemia in neoplastic disease    • Arthritis    • Breast cancer (HCC)     Left   • CVA (cerebral vascular accident) (HCC)    • Hip pain     RIGHT HIP... CYST   • History of fracture of leg    • History of radiation therapy     LAST TREATMENT     • Hypertension    • Limited joint range of motion (ROM)     RIGHT HIP   • Skin sore     OPEN SORE LEFT BREAST   • Syncope    • Vertigo      Past Surgical History:   Procedure Laterality Date   • AXILLARY LYMPH NODE BIOPSY/EXCISION Right     LYMPH NODE UNDER RIGHT ARM-MALIGNANT (DOUBLE MASTECTOMY)   • BREAST BIOPSY Left     MALIGNANT   • FRACTURE SURGERY      Leg   • HARDWARE REMOVAL     • INCISION AND DRAINAGE LEG Left 2022    Procedure: INCISION AND DRAINAGE left ankle;  Surgeon: Kenneth Tran MD;   Location: [facility] MAIN OR;  Service: Orthopedics;  Laterality: Left;   • MASTECTOMY W/ SENTINEL NODE BIOPSY Bilateral 9/16/2019    Procedure: BILATERLA MODIFIED RADICAL MASTECTOMY WITH BILATERAL SENTINEL LYMPH NODE BIOPSY;  Surgeon: Joby Barron Jr., MD;  Location: [facility] MAIN OR;  Service: General   • TOTAL HIP ARTHROPLASTY Right 3/2/2020    Procedure: RIGHT TOTAL HIP ARTHROPLASTY NATALY NAVIGATION;  Surgeon: Luis M Leonard MD;  Location: [facility] MAIN OR;  Service: Orthopedics;  Laterality: Right;   • TOTAL HIP ARTHROPLASTY Left 7/20/2021    Procedure: Posterior LEFT TOTAL HIP ARTHROPLASTY NATALY NAVIGATION;  Surgeon: Luis M Leonard MD;  Location: [facility] MAIN OR;  Service: Orthopedics;  Laterality: Left;   • VENOUS ACCESS DEVICE (PORT) INSERTION Right 2/1/2019    Procedure: INSERTION VENOUS ACCESS DEVICE;  Surgeon: Joby Barron Jr., MD;  Location: [facility];  Service: General   • VENOUS ACCESS DEVICE (PORT) REMOVAL N/A 10/30/2019    Procedure: Mediport Removal;  Surgeon: Joby Barron Jr., MD;  Location: [facility] OR;  Service: General      General Information     Row Name 07/13/22 1001          OT Time and Intention    Document Type therapy note (daily note)  -CW     Mode of Treatment occupational therapy  -CW     Row Name 07/13/22 1001          General Information    Patient Profile Reviewed yes  -CW     Existing Precautions/Restrictions fall  -CW     Row Name 07/13/22 1001          Cognition    Orientation Status (Cognition) oriented x 4  -CW     Row Name 07/13/22 1001          Safety Issues, Functional Mobility    Safety Issues Affecting Function (Mobility) safety precaution awareness;awareness of need for assistance  -CW           User Key  (r) = Recorded By, (t) = Taken By, (c) = Cosigned By    Initials Name Provider Type    CW Rina Valentin OTR Occupational Therapist                 Mobility/ADL's     Row Name 07/13/22 1002          Bed Mobility    All Activities, [place] (Bed  Mobility) modified independence  -     Row Name 07/13/22 1002          Transfers    Transfers toilet transfer  -     [place] Level (Toilet Transfer) contact guard;verbal cues  -     Assistive Device (Toilet Transfer) commode;grab bars/safety frame;walker, front-wheeled  -     Row Name 07/13/22 1002          Toilet Transfer    Type (Toilet Transfer) stand pivot/stand step  -     Row Name 07/13/22 1002          Functional Mobility    Functional Mobility- Ind. Level contact guard assist  -     Functional Mobility- Device walker, front-wheeled  -CW     Row Name 07/13/22 1002          Activities of Daily Living    BADL Assessment/Intervention lower body dressing  -[facility] Name 07/13/22 1002          Lower Body Dressing Assessment/Training    [place] Level (Lower Body Dressing) lower body dressing skills;doff;don;socks;standby assist  -     Position (Lower Body Dressing) edge of bed sitting  -     Row Name 07/13/22 1002          Grooming Assessment/Training    Comment, (Grooming) Pt. reports she is able to complete oral hygiene/grooming with no difficulty  -           User Key  (r) = Recorded By, (t) = Taken By, (c) = Cosigned By    Initials Name Provider Type     Rina Valentin OTR Occupational Therapist               Obj/Interventions     Row Name 07/13/22 1005          Shoulder (Therapeutic Exercise)    Shoulder (Therapeutic Exercise) AROM (active range of motion)  -     Shoulder AROM (Therapeutic Exercise) bilateral;flexion;extension;10 repetitions;2 sets;sitting  -[facility] Name 07/13/22 1005          Elbow/Forearm (Therapeutic Exercise)    Elbow/Forearm (Therapeutic Exercise) AROM (active range of motion)  -     Elbow/Forearm AROM (Therapeutic Exercise) bilateral;flexion;extension;supination;pronation;10 repetitions;2 sets;sitting  -     Row Name 07/13/22 1005          Wrist (Therapeutic Exercise)    Wrist (Therapeutic Exercise) AROM (active range of motion)  -     Wrist  AROM (Therapeutic Exercise) bilateral;flexion;extension;10 repetitions;2 sets  -CW     Row Name 07/13/22 1005          Hand (Therapeutic Exercise)    Hand (Therapeutic Exercise) AROM (active range of motion)  -CW     Hand AROM/AAROM (Therapeutic Exercise) bilateral;AROM (active range of motion);10 repetitions;2 sets  -CW     Row Name 07/13/22 1005          Motor Skills    Motor Skills functional endurance  -CW     Functional Endurance fair+  -CW     Therapeutic Exercise hand;shoulder;elbow/forearm;wrist  -CW           User Key  (r) = Recorded By, (t) = Taken By, (c) = Cosigned By    Initials Name Provider Type    CW Rina Valentin OTR Occupational Therapist               Goals/Plan    No documentation.                Clinical Impression     Row Name 07/13/22 1006          Pain Assessment    Pretreatment Pain Rating 7/10  -CW     Posttreatment Pain Rating 7/10  -CW     Pain Location - Side/Orientation Left  -CW     Pain Location distal  -CW     Pain Location - ankle  -CW     Pain Intervention(s) Repositioned  -CW     Row Name 07/13/22 1006          Plan of Care Review    Plan of Care Reviewed With patient  -CW     Outcome Evaluation OT-Pt. reports L ankle pain 7/10 when standing or moving. Decreased pain at rest. UE therap ex. completed seated to increase activity tolerance for self care. LE dressing SBA seated EOB. Reviewed safety using wx for functional mobility. Ambulated to BR with CGA using rolling wx. Pt. has an elevated commode seat close to the tub shower and states she has a system to complete bathing that works for her. D/c to [facility] for short term rehab soon.  -CW     Row Name 07/13/22 1006          Positioning and Restraints    Pre-Treatment Position in bed  -CW     Post Treatment Position bed  -CW     In Bed fowlers;call light within reach;encouraged to call for assist  -CW           User Key  (r) = Recorded By, (t) = Taken By, (c) = Cosigned By    Initials Name Provider Type    CW Rina Valentin OTR  Occupational Therapist               Outcome Measures     Row Name 07/13/22 1012          How much help from another is currently needed...    Putting on and taking off regular lower body clothing? 3  -CW     Bathing (including washing, rinsing, and drying) 3  -CW     Toileting (which includes using toilet bed pan or urinal) 3  -CW     Putting on and taking off regular upper body clothing 3  -CW     Taking care of personal grooming (such as brushing teeth) 3  -CW     Eating meals 4  -CW     AM-PAC 6 Clicks Score (OT) 19  -CW           User Key  (r) = Recorded By, (t) = Taken By, (c) = Cosigned By    Initials Name Provider Type     Rina Valentin OTR Occupational Therapist                Occupational Therapy Education                 Title: PT OT SLP Therapies (Done)     Topic: Occupational Therapy (Done)     Point: ADL training (Done)     Description:   Instruct learner(s) on proper safety adaptation and remediation techniques during self care or transfers.   Instruct in proper use of assistive devices.              Learning Progress Summary           Patient Acceptance, E, VU by  at 7/13/2022 1013      Show all documentation for this point (2)                 Point: Home exercise program (Done)     Description:   Instruct learner(s) on appropriate technique for monitoring, assisting and/or progressing therapeutic exercises/activities.              Learning Progress Summary           Patient Acceptance, E, VU by  at 7/13/2022 1013      Show all documentation for this point (2)                 Point: Precautions (Done)     Description:   Instruct learner(s) on prescribed precautions during self-care and functional transfers.              Learning Progress Summary           Patient Acceptance, E, VU by  at 7/13/2022 1013      Show all documentation for this point (2)                             User Key     Initials Effective Dates Name Provider Type [facility] 06/16/21 -  Rina Valentin OTR Occupational  Therapist OT              OT Recommendation and Plan     Plan of Care Review  Plan of Care Reviewed With: patient  Outcome Evaluation: OT-Pt. reports L ankle pain 7/10 when standing or moving. Decreased pain at rest. UE therap ex. completed seated to increase activity tolerance for self care. LE dressing SBA seated EOB. Reviewed safety using wx for functional mobility. Ambulated to BR with CGA using rolling wx. Pt. has an elevated commode seat close to the tub shower and states she has a system to complete bathing that works for her. D/c to [facility] for short term rehab soon.     Time Calculation:    Time Calculation- OT     Row Name 07/13/22 1014             Time Calculation- OT    OT Start Time 0801  -CW      OT Stop Time 0824  -CW      OT Time Calculation (min) 23 min  -CW      Total Timed Code Minutes- OT 23 minute(s)  -CW      OT - Next Appointment 07/14/22  -CW              Timed Charges    77687 - OT Therapeutic Exercise Minutes 10  -CW      05732 - OT Self Care/Mgmt Minutes 13  -CW              Total Minutes    Timed Charges Total Minutes 23  -CW       Total Minutes 23  -CW            User Key  (r) = Recorded By, (t) = Taken By, (c) = Cosigned By    Initials Name Provider Type    CW Rina Valentin OTR Occupational Therapist              Therapy Charges for Today     Code Description Service Date Service Provider Modifiers Qty    [id number]  OT THER PROC EA 15 MIN 7/13/2022 Rina Valentin OTR GO 1    [id number]  OT SELF CARE/MGMT/TRAIN EA 15 MIN 7/13/2022 Rina Valentin OTR GO 1               PARAS Garcia  7/13/2022

## 2022-07-13 NOTE — PLAN OF CARE
Goal Outcome Evaluation:  Plan of Care Reviewed With: patient        Progress: improving  Outcome Evaluation: d/c to [facility] rehab. PICC in place. family transporting

## 2022-07-13 NOTE — PROGRESS NOTES
Orthopedic Progress Note      Patient: Marylu Georges    YOB: 1960    Medical Record Number: [id number]    Attending Physician: Hansel Colunga MD    Date of Admission: 6/27/2022  6:51 AM    Admitting Dx:  Elevated C-reactive protein (CRP) [R79.82]  Elevated sed rate [R70.0]  Elevated LFTs [R79.89]  Edema of both upper extremities [R60.0]  Atrial fibrillation, unspecified type (HCC) [I48.91]  New onset atrial fibrillation (HCC) [I48.91]      Current Problem List:   Patient Active Problem List   Diagnosis    Malignant neoplasm of overlapping sites of left breast in female, estrogen receptor positive (HCC)    Family history of breast cancer in female    Syncope    Malignant neoplasm of overlapping sites of both breasts in female, estrogen receptor positive (HCC)    Hypertension    Encounter for long-term (current) use of other medications    Drug-induced hepatitis    Atrial fibrillation (HCC)    Transaminitis    Lymphedema of upper extremity, bilateral    Rash    Inflammatory arthritis    Streptococcal arthritis of left ankle (HCC)    New onset atrial fibrillation (HCC)         Past Medical History:   Diagnosis Date    Anemia in neoplastic disease     Arthritis     Breast cancer (HCC)     Left    CVA (cerebral vascular accident) (HCC)     Hip pain     RIGHT HIP... CYST    History of fracture of leg 1987    History of radiation therapy     LAST TREATMENT      Hypertension     Limited joint range of motion (ROM)     RIGHT HIP    Skin sore     OPEN SORE LEFT BREAST    Syncope     Vertigo        Current Medications:  Scheduled Meds:apixaban, 5 mg, Oral, Q12H  cefTRIAXone, 2 g, Intravenous, Q24H  clobetasol, 1 application, Topical, Q12H  digoxin, 125 mcg, Oral, Daily  exemestane, 25 mg, Oral, Daily  famotidine, 20 mg, Oral, BID AC  ibuprofen, 200 mg, Oral, Daily  lidocaine, 1 patch, Transdermal, Q24H  metoprolol tartrate, 75 mg, Oral, Q12H  polyethylene glycol, 17 g, Oral, BID  senna-docusate  sodium, 2 tablet, Oral, Nightly  sodium chloride, 10 mL, Intravenous, Q12H      PRN Meds:.[DISCONTINUED] senna-docusate sodium **AND** [DISCONTINUED] polyethylene glycol **AND** bisacodyl **AND** bisacodyl    HYDROcodone-acetaminophen    nitroglycerin    ondansetron **OR** ondansetron    simethicone    [COMPLETED] Insert peripheral IV **AND** sodium chloride    sodium chloride    tiZANidine    traZODone    SUBJECTIVE: 62 y.o.  female awake and alert.  No complaints    OBJECTIVE:   Vitals:    07/12/22 1356 07/12/22 2037 07/12/22 2351 07/13/22 0758   BP: 106/74 98/73 130/81 125/69   BP Location: Right arm Right arm Right arm Right arm   Patient Position: Lying Sitting Sitting Lying   Pulse: 58 65 80 56   Resp: 18 18 18 18   Temp:  97.6 °F (36.4 °C) 98.1 °F (36.7 °C) 97.5 °F (36.4 °C)   TempSrc: Oral Oral Oral Oral   SpO2:       Weight:       Height:         I/O last 3 completed shifts:  In: 830 [P.O.:830]  Out: -     Diagnostic Tests:   Lab Results (last 24 hours)       Procedure Component Value Units Date/Time    COVID PRE-OP / PRE-PROCEDURE SCREENING ORDER (NO ISOLATION) - Swab, Nasopharynx [[id number]]  (Normal) Collected: 07/13/22 0615    Specimen: Swab from Nasopharynx Updated: 07/13/22 1107    Narrative:      The following orders were created for panel order COVID PRE-OP / PRE-PROCEDURE SCREENING ORDER (NO ISOLATION) - Swab, Nasopharynx.  Procedure                               Abnormality         Status                     ---------                               -----------         ------                     COVID-19,APTIMA PANTHER(...[[id number]]  Normal              Final result                 Please view results for these tests on the individual orders.    COVID-19,APTIMA PANTHER(OTTO),[facility], NP/OP SWAB IN UTM/VTM/SALINE TRANSPORT MEDIA,24 HR TAT - Swab, Nasopharynx [[id number]]  (Normal) Collected: 07/13/22 0615    Specimen: Swab from Nasopharynx Updated: 07/13/22 1107     COVID19 Not Detected     Narrative:      Fact sheet for providers: https://www.fda.gov/media/332539/download     Fact sheet for patients: https://www.fda.gov/media/239989/download    Test performed by RT PCR.            PHYSICAL EXAM: Left ankle incision is intact with sutures.  There is no erythema no drainage.  She still has some mild swelling to the left ankle.  Have encouraged her to work on ankle pumps and range of motion of that ankle.  She is progressing with ambulation.  Back pain is well controlled with oral medication.  Moving legs well.  Strength is equal bilaterally.  Patient is planning to be transferred to [facility] rehab later today.     ASSESSMENT & PLAN:    Sutures removed from left ankle.  Incision was Steri-Stripped and a new dressing is applied.  Patient  to return to the office in 3 weeks to see Dr. Kenneth Tran for her left ankle.  Return to the office to see Dr. Alejandro Meredith in 4 weeks for her lumbar spine      Date: 7/13/2022    Maria Isabel Tam RN        I have reviewed and agree with the above.     Kenneth Tran MD

## 2022-07-13 NOTE — DISCHARGE SUMMARY
[facility]    Date of Discharge:  7/13/2022    PCP: Elie Dixon MD    Discharge Diagnosis:   Active Hospital Problems    Diagnosis  POA   • **Streptococcal arthritis of left ankle (HCC) [M00.272]  Yes   • New onset atrial fibrillation (HCC) [I48.91]  Yes   • Inflammatory arthritis [M19.90]  Yes   • Atrial fibrillation (HCC) [I48.91]  Yes   • Transaminitis [R74.01]  Yes   • Lymphedema of upper extremity, bilateral [I89.0]  Yes   • Rash [R21]  Yes   • Malignant neoplasm of overlapping sites of left breast in female, estrogen receptor positive (HCC) [C50.812, Z17.0]  Not Applicable      Resolved Hospital Problems   No resolved problems to display.       Consults     Date and Time Order Name Status Description    7/8/2022  4:27 PM Inpatient General Surgery Consult Completed     7/5/2022  7:49 AM Inpatient Hand Surgery Consult Completed     7/1/2022 12:08 PM Inpatient Orthopedic Surgery Consult Completed     6/29/2022 10:14 AM Inpatient Orthopedic Surgery Consult Completed     6/27/2022  4:41 PM Inpatient Dermatology Consult      6/27/2022  3:49 PM Inpatient Infectious Diseases Consult Completed     6/27/2022 10:32 AM Inpatient Hematology & Oncology Consult Completed     6/27/2022 10:32 AM Inpatient Cardiology Consult Completed     6/27/2022  9:01 AM [facility] (on-call MD unless specified) Details          Hospital Course  62 y.o. 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 Infectious diseases and dermatology were asked to see. Arthrocentesis of the left ankle showed group C strep. She had washout of the ankle on 6/30/2022. MRI of the left shoulder showed  synovitis. MRI of the lumbar spine showed discitis and myositis. She also had left hand MCP septic arthritis. There is also aortic valve calcification with suspected endocarditis.     Ortho recommends she try ambulating at least 2-3 times a day with staff or PT. She also needs to work on range of motion of the left ankle. She should see Dr. Meredith in 4 weeks for repeat x-rays of the lumbar spine.    ID recommends 6 weeks of ceftriaxone stop date August 10, 2022 and they will see her August 10 for follow-up. PICC line has been placed. They recommend weekly labs: CBC with differential, serum creatinine and fax to them at [phone]    She also had new onset atrial fibrillation with RVR. Echocardiogram showed PFO with severely dilated left atrial cavity. She is currently on digoxin, metoprolol, and Eliquis.    She has been transferred to acute rehab today.    I discussed the patient's findings and my recommendations with patient and nursing staff and Maria Isabel Tam with orthopedic surgery- she will see today and evaluate for suture removal.    Condition on Discharge: Stable for acute rehab.     Temp:  [97.5 °F (36.4 °C)-98.1 °F (36.7 °C)] 97.5 °F (36.4 °C)  Heart Rate:  [56-80] 56  Resp:  [18] 18  BP: ()/(69-81) 125/69  Body mass index is 25.02 kg/m².    Physical Exam  Constitutional:       General: She is not in acute distress.     Appearance: Normal appearance. She is not toxic-appearing.   HENT:      Head: Normocephalic and atraumatic.   Cardiovascular:      Rate and Rhythm: Normal rate. Rhythm irregular.   Pulmonary:      Effort: Pulmonary effort is normal. No respiratory distress.      Breath sounds: Normal breath sounds. No wheezing, rhonchi or rales.   Abdominal:      General: Bowel sounds are normal.      Palpations: Abdomen is soft.      Tenderness: There is no abdominal tenderness. There is no guarding or rebound.   Skin:     General: Skin is warm and dry.   Neurological:      General: No focal  deficit present.      Mental Status: She is alert.   Psychiatric:         Mood and Affect: Mood normal.         Behavior: Behavior normal.         Thought Content: Thought content normal.          Discharge Medications      New Medications      Instructions Start Date   apixaban 5 MG tablet tablet  Commonly known as: ELIQUIS   5 mg, Oral, Every 12 Hours Scheduled      bisacodyl 5 MG EC tablet  Commonly known as: DULCOLAX   5 mg, Oral, Daily PRN      bisacodyl 10 MG suppository  Commonly known as: DULCOLAX   10 mg, Rectal, Daily PRN      cefTRIAXone 2 g in sodium chloride 0.9 % 100 mL IVPB   2 g, Intravenous, Every 24 Hours      clobetasol 0.05 % cream  Commonly known as: TEMOVATE   1 application, Topical, Every 12 Hours Scheduled      digoxin 125 MCG tablet  Commonly known as: LANOXIN   125 mcg, Oral, Daily Digoxin      famotidine 20 MG tablet  Commonly known as: PEPCID   20 mg, Oral, 2 Times Daily Before Meals      HYDROcodone-acetaminophen 7.5-325 MG per tablet  Commonly known as: NORCO   1 tablet, Oral, Every 4 Hours PRN      ibuprofen 200 MG tablet  Commonly known as: ADVIL,MOTRIN   200 mg, Oral, Daily      lidocaine 5 %  Commonly known as: LIDODERM   1 patch, Transdermal, Every 24 Hours Scheduled, Remove & Discard patch within 12 hours or as directed by MD      Metoprolol Tartrate 75 MG tablet   75 mg, Oral, Every 12 Hours Scheduled      polyethylene glycol 17 g packet  Commonly known as: MIRALAX   17 g, Oral, 2 Times Daily      sennosides-docusate 8.6-50 MG per tablet  Commonly known as: PERICOLACE   2 tablets, Oral, Nightly      simethicone 80 MG chewable tablet  Commonly known as: MYLICON   80 mg, Oral, 4 Times Daily PRN      tiZANidine 4 MG tablet  Commonly known as: ZANAFLEX   4 mg, Oral, Every 6 Hours PRN      traZODone 50 MG tablet  Commonly known as: DESYREL   50 mg, Oral, Nightly PRN         Continue These Medications      Instructions Start Date   Cholecalciferol 250 MCG (84040 UT) tablet   1 tablet,  "Oral, Patient stated dose as \"1500 mg\"       exemestane 25 MG chemo tablet  Commonly known as: AROMASIN   25 mg, Oral, Daily         Stop These Medications    acetaminophen 650 MG 8 hr tablet  Commonly known as: TYLENOL     diclofenac 75 MG EC tablet  Commonly known as: VOLTAREN     gabapentin 100 MG capsule  Commonly known as: NEURONTIN     magnesium oxide 400 MG tablet  Commonly known as: MAG-OX     meclizine 25 MG tablet  Commonly known as: ANTIVERT     metoprolol succinate XL 25 MG 24 hr tablet  Commonly known as: TOPROL-XL     naproxen 500 MG tablet  Commonly known as: NAPROSYN          Disposition: [facility]    Diet Instructions     Diet: Regular      Discharge Diet: Regular         Activity Instructions     Activity as Tolerated          Additional Instructions for the Follow-ups that You Need to Schedule     Call MD for problems / concerns.   As directed      Discharge Follow-up with Specialty: Orthopedic; 2 Weeks   As directed      Specialty: Orthopedic    Follow Up: 2 Weeks    Follow Up Details: Please follow-up with Dr. Kenneth Tran call office to schedule appointment [phone]            Contact information for follow-up providers     Elie Dixon MD .    Specialties: Hematology and Oncology, Oncology, Hematology  Contact information:  [address] [facility] [address]  [place] [address]  [phone]             Caitlyn Johns MD Follow up.    Specialty: Cardiology  Contact information:  [address] [facility] [address]  [place] [address]  [phone]             Kenneth Tran MD Follow up.    Specialty: Orthopedic Surgery  Contact information:  [address] [facility]  [place] [address]  [phone]             Alejandro Meredith MD Follow up in 4 week(s).    Specialty: Orthopedic Surgery  Why: Repeat x-rays lumbar spine  Contact information:  [address] [facility] [facility] [address]  [place] [address]  [phone]                   Contact information for after-discharge care     Destination     " [facility] [place] .    Service: Inpatient Rehabilitation  Contact information:  [address]  [facility] [address] [phone]                 Dialysis/Infusion     [facility] .    Service: Infusion and IV Therapy  Contact information:  [address]  [facility] [address] [phone]                 Home Medical Care     [facility] .    Service: Home Health Services  Contact information:  [address]  [facility] [address] [phone]                           Pending Labs     Order Current Status    COVID PRE-OP / PRE-PROCEDURE SCREENING ORDER (NO ISOLATION) - Swab, Nasopharynx In process    COVID-19,APTIMA PANTHER(OTTO),[facility], NP/OP SWAB IN UTM/VTM/SALINE TRANSPORT MEDIA,24 HR TAT - Swab, Nasopharynx In process           Hansel Reggie Colunga MD  [facility]  07/13/22

## 2022-07-13 NOTE — CASE MANAGEMENT/SOCIAL WORK
Case Management Discharge Note      [facility]    Provided Post Acute Provider List?: Refused  Refused Provider List Comment: Currently denies any needs. Will need to follow for poss HH or SNF need at D/C.  Provided Post Acute Provider Quality & Resource List?: Refused    Selected Continued Care - Admitted Since 6/27/2022     Destination Coordination complete.    Service Provider Selected Services Address Phone Fax Patient Preferred    [facility]  Inpatient Rehabilitation [address] [facility] [address] [phone] -- --          Durable Medical Equipment    No services have been selected for the patient.              Dialysis/Infusion Coordination complete.    Service Provider Selected Services Address Phone Fax Patient Preferred    [facility]  [facility]fusion and IV Therapy [address] [address] [address] [phone] --          Home Medical Care Coordination complete.    Service Provider Selected Services Address Phone Fax Patient Preferred    [facility]  Home Health Services [address] [address] [phone] --          Therapy    No services have been selected for the patient.              Community Resources    No services have been selected for the patient.              Community & DME    No services have been selected for the patient.                Selected Continued Care - Episodes Includes selections from active Coordinated Care Management episodes    Oncology Episode start date: 12/6/2021   There are no active outsourced providers for this episode.               Transportation Services  Private: Car    Final Discharge Disposition Code: 62 - inpatient rehab facility

## 2022-07-14 NOTE — PAYOR COMM NOTE
"Rhonda Davis (62 y.o. Female)     DC SUMMARY FOR [id number]    CONTACT KAYLA NAVARRETE  F# [phone]  P# [phone]            Date of Birth   1960    Social Security Number       Address   [address] [place] [address]    Home Phone   [phone]    MRN   [id number]       Denominational   Protestant    Marital Status                               Admission Date   6/27/22    Admission Type   Emergency    Admitting Provider   Santhosh Miller MD    Attending Provider       Department, Room/Bed   [address], E454/1       Discharge Date   7/13/2022    Discharge Disposition   Rehab Facility or Unit (DC - External)    Discharge Destination                               Attending Provider: (none)   Allergies: Benadryl [Diphenhydramine], Erythromycin, Levaquin [Levofloxacin], Penicillins    Isolation: None   Infection: None   Code Status: Prior   Advance Care Planning Activity    Ht: 167.6 cm (66\")   Wt: 70.3 kg (155 lb)    Admission Cmt: None   Principal Problem: 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                 Active Insurance as of 6/27/2022     Primary Coverage     Payor Plan Insurance Group Employer/Plan Group    ANTHEM BLUE CROSS ANTHEM BLUE CROSS BLUE SHIELD PPO [id number]     Payor Plan Address Payor Plan Phone Number Payor Plan Fax Number Effective Dates    [address] [phone]  7/15/2009 - None Entered    [place] [address]       Subscriber Name Subscriber Birth Date Member ID       RHONDA DAVIS 1960 [id number]                 Emergency Contacts      (Rel.) Home Phone Work Phone Mobile Phone    FAMILIA DAVIS [facility] (Brother) [phone] -- [phone]    Cruz Landry (Spouse) [phone] -- [phone]               Physician Progress Notes (last 72 hours)      Kenneth Tran MD at 07/13/22 1148              Orthopedic Progress Note      Patient: Rhonda Davis    Date of Birth: " 1960    Medical Record Number: [id number]    Attending Physician: Hansel Colunga MD    Date of Admission: 6/27/2022  6:51 AM    Admitting Dx:  Elevated C-reactive protein (CRP) [R79.82]  Elevated sed rate [R70.0]  Elevated LFTs [R79.89]  Edema of both upper extremities [R60.0]  Atrial fibrillation, unspecified type (HCC) [I48.91]  New onset atrial fibrillation (HCC) [I48.91]      Current Problem List:   Patient Active Problem List   Diagnosis   • Malignant neoplasm of overlapping sites of left breast in female, estrogen receptor positive (HCC)   • Family history of breast cancer in female   • Syncope   • Malignant neoplasm of overlapping sites of both breasts in female, estrogen receptor positive (HCC)   • Hypertension   • Encounter for long-term (current) use of other medications   • Drug-induced hepatitis   • Atrial fibrillation (HCC)   • Transaminitis   • Lymphedema of upper extremity, bilateral   • Rash   • Inflammatory arthritis   • Streptococcal arthritis of left ankle (HCC)   • New onset atrial fibrillation (HCC)         Past Medical History:   Diagnosis Date   • Anemia in neoplastic disease    • Arthritis    • Breast cancer (HCC)     Left   • CVA (cerebral vascular accident) (HCC)    • Hip pain     RIGHT HIP... CYST   • History of fracture of leg 1987   • History of radiation therapy     LAST TREATMENT     • Hypertension    • Limited joint range of motion (ROM)     RIGHT HIP   • Skin sore     OPEN SORE LEFT BREAST   • Syncope    • Vertigo        Current Medications:  Scheduled Meds:apixaban, 5 mg, Oral, Q12H  cefTRIAXone, 2 g, Intravenous, Q24H  clobetasol, 1 application, Topical, Q12H  digoxin, 125 mcg, Oral, Daily  exemestane, 25 mg, Oral, Daily  famotidine, 20 mg, Oral, BID AC  ibuprofen, 200 mg, Oral, Daily  lidocaine, 1 patch, Transdermal, Q24H  metoprolol tartrate, 75 mg, Oral, Q12H  polyethylene glycol, 17 g, Oral, BID  senna-docusate sodium, 2 tablet, Oral, Nightly  sodium chloride, 10  mL, Intravenous, Q12H      PRN Meds:.[DISCONTINUED] senna-docusate sodium **AND** [DISCONTINUED] polyethylene glycol **AND** bisacodyl **AND** bisacodyl  •  HYDROcodone-acetaminophen  •  nitroglycerin  •  ondansetron **OR** ondansetron  •  simethicone  •  [COMPLETED] Insert peripheral IV **AND** sodium chloride  •  sodium chloride  •  tiZANidine  •  traZODone    SUBJECTIVE: 62 y.o.  female awake and alert.  No complaints    OBJECTIVE:   Vitals:    07/12/22 1356 07/12/22 2037 07/12/22 2351 07/13/22 0758   BP: 106/74 98/73 130/81 125/69   BP Location: Right arm Right arm Right arm Right arm   Patient Position: Lying Sitting Sitting Lying   Pulse: 58 65 80 56   Resp: 18 18 18 18   Temp:  97.6 °F (36.4 °C) 98.1 °F (36.7 °C) 97.5 °F (36.4 °C)   TempSrc: Oral Oral Oral Oral   SpO2:       Weight:       Height:         I/O last 3 completed shifts:  In: 830 [P.O.:830]  Out: -     Diagnostic Tests:   Lab Results (last 24 hours)       Procedure Component Value Units Date/Time    COVID PRE-OP / PRE-PROCEDURE SCREENING ORDER (NO ISOLATION) - Swab, Nasopharynx [[id number]]  (Normal) Collected: 07/13/22 0615    Specimen: Swab from Nasopharynx Updated: 07/13/22 1107    Narrative:      The following orders were created for panel order COVID PRE-OP / PRE-PROCEDURE SCREENING ORDER (NO ISOLATION) - Swab, Nasopharynx.  Procedure                               Abnormality         Status                     ---------                               -----------         ------                     COVID-19,APTIMA PANTHER(...[[id number]]  Normal              Final result                 Please view results for these tests on the individual orders.    COVID-19,APTIMA PANTHER(OTTO),[facility]/ [facility], NP/OP SWAB IN UTM/VTM/SALINE TRANSPORT MEDIA,24 HR TAT - Swab, Nasopharynx [[id number]]  (Normal) Collected: 07/13/22 0615    Specimen: Swab from Nasopharynx Updated: 07/13/22 1107     COVID19 Not Detected    Narrative:      Fact sheet for providers:  https://www.fda.gov/media/722100/download     Fact sheet for patients: https://www.fda.gov/media/254574/download    Test performed by RT PCR.            PHYSICAL EXAM: Left ankle incision is intact with sutures.  There is no erythema no drainage.  She still has some mild swelling to the left ankle.  Have encouraged her to work on ankle pumps and range of motion of that ankle.  She is progressing with ambulation.  Back pain is well controlled with oral medication.  Moving legs well.  Strength is equal bilaterally.  Patient is planning to be transferred to [facility] rehab later today.     ASSESSMENT & PLAN:    Sutures removed from left ankle.  Incision was Steri-Stripped and a new dressing is applied.  Patient  to return to the office in 3 weeks to see Dr. Kenneth Tran for her left ankle.  Return to the office to see Dr. Alejandro Meredith in 4 weeks for her lumbar spine      Date: 2022    Maria Isabel Tam RN        I have reviewed and agree with the above.     Kenneth Tran MD          Electronically signed by Kenneth Tran MD at 22 0745     Hansel Colunga MD at 22 1503              Name: Marylu Georges ADMIT: 2022   : 1960  PCP: Elie Dixon MD    MRN: [id number] LOS: 13 days   AGE/SEX: 62 y.o. female  ROOM: [facility]     Subjective   Subjective   No new complaints. Pain and ROM pain continues to improve. Some residual stiffness left hand. She walked 150 feet with physical therapy.    Review of Systems   Constitutional: Negative for fever.   Respiratory: Negative for cough and shortness of breath.    Cardiovascular: Negative for chest pain.   Gastrointestinal: Negative for abdominal pain.   Genitourinary: Negative for dysuria.   Neurological: Negative for headaches.     Objective   Objective   Vital Signs  Temp:  [97.5 °F (36.4 °C)-98.5 °F (36.9 °C)] 98 °F (36.7 °C)  Heart Rate:  [58-67] 58  Resp:  [18] 18  BP: ()/(56-74) 106/74     on   ;   Device (Oxygen Therapy):  room air  Body mass index is 25.02 kg/m².    Physical Exam  Constitutional:       General: She is not in acute distress.     Appearance: Normal appearance. She is not toxic-appearing.   HENT:      Head: Normocephalic and atraumatic.   Cardiovascular:      Rate and Rhythm: Normal rate and regular rhythm.   Pulmonary:      Effort: Pulmonary effort is normal. No respiratory distress.      Breath sounds: Normal breath sounds. No wheezing or rhonchi.   Abdominal:      General: Bowel sounds are normal.      Palpations: Abdomen is soft.      Tenderness: There is no abdominal tenderness. There is no guarding or rebound.   Musculoskeletal:      Right lower leg: No edema.      Left lower leg: No edema.   Skin:     General: Skin is warm and dry.   Neurological:      General: No focal deficit present.      Mental Status: She is alert and oriented to person, place, and time.   Psychiatric:         Mood and Affect: Mood normal.         Behavior: Behavior normal.         Thought Content: Thought content normal.     Results Review  I reviewed the patient's new clinical results.  Results from last 7 days   Lab Units 07/09/22 0441 07/06/22  0554   WBC 10*3/mm3 4.34 5.45   HEMOGLOBIN g/dL 10.4* 11.6*   PLATELETS 10*3/mm3 428 468*     Results from last 7 days   Lab Units 07/09/22  0441 07/06/22  0554   SODIUM mmol/L 140 134*   POTASSIUM mmol/L 4.3 4.2   CHLORIDE mmol/L 103 101   CO2 mmol/L 26.0 25.5   BUN mg/dL 18 16   CREATININE mg/dL 0.80 0.61   GLUCOSE mg/dL 96 93     Lab Results   Component Value Date    ANIONGAP 11.0 07/09/2022     Estimated Creatinine Clearance: 78.8 mL/min (by C-G formula based on SCr of 0.8 mg/dL).    Results from last 7 days   Lab Units 07/06/22  0554   ALBUMIN g/dL 3.10*   BILIRUBIN mg/dL 0.5   ALK PHOS U/L 105   AST (SGOT) U/L 37*   ALT (SGPT) U/L 44*     Results from last 7 days   Lab Units 07/09/22  0441 07/06/22  0554   CALCIUM mg/dL 9.1 8.9   ALBUMIN g/dL  --  3.10*         Scheduled Meds  apixaban, 5  mg, Oral, Q12H  cefTRIAXone, 2 g, Intravenous, Q24H  clobetasol, 1 application, Topical, Q12H  digoxin, 125 mcg, Oral, Daily  exemestane, 25 mg, Oral, Daily  famotidine, 20 mg, Oral, BID AC  ibuprofen, 200 mg, Oral, Daily  lidocaine, 1 patch, Transdermal, Q24H  metoprolol tartrate, 75 mg, Oral, Q12H  polyethylene glycol, 17 g, Oral, BID  senna-docusate sodium, 2 tablet, Oral, Nightly  sodium chloride, 10 mL, Intravenous, Q12H    Continuous Infusions   PRN Meds  [DISCONTINUED] senna-docusate sodium **AND** [DISCONTINUED] polyethylene glycol **AND** bisacodyl **AND** bisacodyl  •  HYDROcodone-acetaminophen  •  nitroglycerin  •  ondansetron **OR** ondansetron  •  simethicone  •  [COMPLETED] Insert peripheral IV **AND** sodium chloride  •  sodium chloride  •  tiZANidine  •  traZODone     Diet  Diet Regular       Assessment/Plan     Active Hospital Problems    Diagnosis  POA   • **Streptococcal arthritis of left ankle (HCC) [M00.272]  Yes   • New onset atrial fibrillation (HCC) [I48.91]  Yes   • Inflammatory arthritis [M19.90]  Yes   • Atrial fibrillation (HCC) [I48.91]  Yes   • Transaminitis [R74.01]  Yes   • Lymphedema of upper extremity, bilateral [I89.0]  Yes   • Rash [R21]  Yes   • Malignant neoplasm of overlapping sites of left breast in female, estrogen receptor positive (HCC) [C50.812, Z17.0]  Not Applicable      Resolved Hospital Problems   No resolved problems to display.     62 y.o. female with a history of left-sided breast cancer status post bilateral mastectomy in 2019, stroke, hypertension presented with bilateral upper extremity swelling and left ankle swelling and rash. She also had new onset A. fib with RVR. Arthrocentesis of left ankle showed group C strep and she is now status post washout (6/30/22). MRI left shoulder showed synovitis. MRI of lumbar spine showed discitis and myositis. She also had left hand MCP septic arthritis. Aortic valve calcification with suspected endocarditis.    ID plans 6  weeks of IV Rocephin stop date August 10, 2022 with weekly labs. Follow-up ID August 10. PICC line placed.    New onset atrial fibrillation with RVR: Echocardiogram with PFO and severely dilated left atrial cavity. Digoxin, metoprolol, Eliquis.    SCDs for DVT prophylaxis. Adding scheduled bowel medications.    Discussed with patient, nursing staff and CCP    Discharge: Maybe doing too well for rehab. Patient would be fine with going home tomorrow with with home health or outpatient PT.    Hansel Colunga MD  [facility]  07/12/22      Electronically signed by Hansel Colunga MD at 07/12/22 1504       Consult Notes (last 72 hours)  Notes from 07/11/22 1413 through 07/14/22 1413   No notes of this type exist for this encounter.            Discharge Summary      Hansel Colunga MD at 07/13/22 0714                              [facility]    Date of Discharge:  7/13/2022    PCP: Elie Dixon MD    Discharge Diagnosis:   Active Hospital Problems    Diagnosis  POA   • **Streptococcal arthritis of left ankle (HCC) [M00.272]  Yes   • New onset atrial fibrillation (HCC) [I48.91]  Yes   • Inflammatory arthritis [M19.90]  Yes   • Atrial fibrillation (HCC) [I48.91]  Yes   • Transaminitis [R74.01]  Yes   • Lymphedema of upper extremity, bilateral [I89.0]  Yes   • Rash [R21]  Yes   • Malignant neoplasm of overlapping sites of left breast in female, estrogen receptor positive (HCC) [C50.812, Z17.0]  Not Applicable      Resolved Hospital Problems   No resolved problems to display.       Consults     Date and Time Order Name Status Description    7/8/2022  4:27 PM Inpatient General Surgery Consult Completed     7/5/2022  7:49 AM Inpatient Hand Surgery Consult Completed     7/1/2022 12:08 PM Inpatient Orthopedic Surgery Consult Completed     6/29/2022 10:14 AM Inpatient Orthopedic Surgery Consult Completed     6/27/2022  4:41 PM Inpatient Dermatology Consult       6/27/2022  3:49 PM Inpatient Infectious Diseases Consult Completed     6/27/2022 10:32 AM Inpatient Hematology & Oncology Consult Completed     6/27/2022 10:32 AM Inpatient Cardiology Consult Completed     6/27/2022  9:01 AM SHONDA (on-call MD unless specified) Details          Hospital Course  62 y.o. 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 Infectious diseases and dermatology were asked to see. Arthrocentesis of the left ankle showed group C strep. She had washout of the ankle on 6/30/2022. MRI of the left shoulder showed synovitis. MRI of the lumbar spine showed discitis and myositis. She also had left hand MCP septic arthritis. There is also aortic valve calcification with suspected endocarditis.     Ortho recommends she try ambulating at least 2-3 times a day with staff or PT. She also needs to work on range of motion of the left ankle. She should see Dr. Meredith in 4 weeks for repeat x-rays of the lumbar spine.    ID recommends 6 weeks of ceftriaxone stop date August 10, 2022 and they will see her August 10 for follow-up. PICC line has been placed. They recommend weekly labs: CBC with differential, serum creatinine and fax to them at [phone]    She also had new onset atrial fibrillation with RVR. Echocardiogram showed PFO with severely dilated left atrial cavity. She is currently on digoxin, metoprolol, and Eliquis.    She has been transferred to acute rehab today.    I discussed the patient's findings and my recommendations with patient and nursing staff and Maria Isabel Tam with orthopedic surgery- she will see today and evaluate for suture removal.    Condition on Discharge: Stable for acute rehab.      Temp:  [97.5 °F (36.4 °C)-98.1 °F (36.7 °C)] 97.5 °F (36.4 °C)  Heart Rate:  [56-80] 56  Resp:  [18] 18  BP: ()/(69-81) 125/69  Body mass index is 25.02 kg/m².    Physical Exam  Constitutional:       General: She is not in acute distress.     Appearance: Normal appearance. She is not toxic-appearing.   HENT:      Head: Normocephalic and atraumatic.   Cardiovascular:      Rate and Rhythm: Normal rate. Rhythm irregular.   Pulmonary:      Effort: Pulmonary effort is normal. No respiratory distress.      Breath sounds: Normal breath sounds. No wheezing, rhonchi or rales.   Abdominal:      General: Bowel sounds are normal.      Palpations: Abdomen is soft.      Tenderness: There is no abdominal tenderness. There is no guarding or rebound.   Skin:     General: Skin is warm and dry.   Neurological:      General: No focal deficit present.      Mental Status: She is alert.   Psychiatric:         Mood and Affect: Mood normal.         Behavior: Behavior normal.         Thought Content: Thought content normal.          Discharge Medications      New Medications      Instructions Start Date   apixaban 5 MG tablet tablet  Commonly known as: ELIQUIS   5 mg, Oral, Every 12 Hours Scheduled      bisacodyl 5 MG EC tablet  Commonly known as: DULCOLAX   5 mg, Oral, Daily PRN      bisacodyl 10 MG suppository  Commonly known as: DULCOLAX   10 mg, Rectal, Daily PRN      cefTRIAXone 2 g in sodium chloride 0.9 % 100 mL IVPB   2 g, Intravenous, Every 24 Hours      clobetasol 0.05 % cream  Commonly known as: TEMOVATE   1 application, Topical, Every 12 Hours Scheduled      digoxin 125 MCG tablet  Commonly known as: LANOXIN   125 mcg, Oral, Daily Digoxin      famotidine 20 MG tablet  Commonly known as: PEPCID   20 mg, Oral, 2 Times Daily Before Meals      HYDROcodone-acetaminophen 7.5-325 MG per tablet  Commonly known as: NORCO   1 tablet, Oral, Every 4 Hours PRN      ibuprofen 200 MG tablet  Commonly known as: ADVIL,MOTRIN   200 mg,  "Oral, Daily      lidocaine 5 %  Commonly known as: LIDODERM   1 patch, Transdermal, Every 24 Hours Scheduled, Remove & Discard patch within 12 hours or as directed by MD      Metoprolol Tartrate 75 MG tablet   75 mg, Oral, Every 12 Hours Scheduled      polyethylene glycol 17 g packet  Commonly known as: MIRALAX   17 g, Oral, 2 Times Daily      sennosides-docusate 8.6-50 MG per tablet  Commonly known as: PERICOLACE   2 tablets, Oral, Nightly      simethicone 80 MG chewable tablet  Commonly known as: MYLICON   80 mg, Oral, 4 Times Daily PRN      tiZANidine 4 MG tablet  Commonly known as: ZANAFLEX   4 mg, Oral, Every 6 Hours PRN      traZODone 50 MG tablet  Commonly known as: DESYREL   50 mg, Oral, Nightly PRN         Continue These Medications      Instructions Start Date   Cholecalciferol 250 MCG (15172 UT) tablet   1 tablet, Oral, Patient stated dose as \"1500 mg\"       exemestane 25 MG chemo tablet  Commonly known as: AROMASIN   25 mg, Oral, Daily         Stop These Medications    acetaminophen 650 MG 8 hr tablet  Commonly known as: TYLENOL     diclofenac 75 MG EC tablet  Commonly known as: VOLTAREN     gabapentin 100 MG capsule  Commonly known as: NEURONTIN     magnesium oxide 400 MG tablet  Commonly known as: MAG-OX     meclizine 25 MG tablet  Commonly known as: ANTIVERT     metoprolol succinate XL 25 MG 24 hr tablet  Commonly known as: TOPROL-XL     naproxen 500 MG tablet  Commonly known as: NAPROSYN          Disposition: [facility]    Diet Instructions     Diet: Regular      Discharge Diet: Regular         Activity Instructions     Activity as Tolerated          Additional Instructions for the Follow-ups that You Need to Schedule     Call MD for problems / concerns.   As directed      Discharge Follow-up with Specialty: Orthopedic; 2 Weeks   As directed      Specialty: Orthopedic    Follow Up: 2 Weeks    Follow Up Details: Please follow-up with Dr. Kenneth Tran call office to schedule appointment " [phone]            Contact information for follow-up providers     Elie Dixon MD .    Specialties: Hematology and Oncology, Oncology, Hematology  Contact information:  [address]  [facility] [address]  [phone]             Caitlyn Johns MD Follow up.    Specialty: Cardiology  Contact information:  [address]  [address]  [facility] [place] [address]  [phone]             Kenneth Tran MD Follow up.    Specialty: Orthopedic Surgery  Contact information:  [address]  [facility] [address]  [phone]             Alejandro Meredith MD Follow up in 4 week(s).    Specialty: Orthopedic Surgery  Why: Repeat x-rays lumbar spine  Contact information:  [address]  [place] [address]  [phone]                   Contact information for after-discharge care     Destination     [facility] .    Service: Inpatient Rehabilitation  Contact information:  [address]  [facility] [address] [phone]                 Dialysis/Infusion     [facility] .    Service: Infusion and IV Therapy  Contact information:  [address] [facility] [address]  [phone]                 Home Medical Care     [facility] .    Service: Home Health Services  Contact information:  [address] [facility] [address]  [facility] [address] [phone]                           Pending Labs     Order Current Status    COVID PRE-OP / PRE-PROCEDURE SCREENING ORDER (NO ISOLATION) - Swab, Nasopharynx In process    COVID-19,APTIMA PANTHER(OTTO),[facility], NP/OP SWAB IN UTM/VTM/SALINE TRANSPORT MEDIA,24 HR TAT - Swab, Nasopharynx In process           Hansel Colunga MD  [facility]  07/13/22     Electronically signed by Hansel Colunga MD at 07/13/22 09

## 2022-07-21 ENCOUNTER — TRANSCRIBE ORDERS (OUTPATIENT)
Dept: HOME HEALTH SERVICES | Facility: HOME HEALTHCARE | Age: 62
End: 2022-07-21

## 2022-07-21 ENCOUNTER — HOME HEALTH ADMISSION (OUTPATIENT)
Dept: HOME HEALTH SERVICES | Facility: HOME HEALTHCARE | Age: 62
End: 2022-07-21

## 2022-07-21 DIAGNOSIS — M00.272 STREPTOCOCCAL ARTHRITIS OF LEFT ANKLE: Primary | ICD-10-CM

## 2022-07-22 ENCOUNTER — HOME CARE VISIT (OUTPATIENT)
Dept: HOME HEALTH SERVICES | Facility: HOME HEALTHCARE | Age: 62
End: 2022-07-22

## 2022-07-22 VITALS
TEMPERATURE: 97.6 F | RESPIRATION RATE: 18 BRPM | HEART RATE: 71 BPM | DIASTOLIC BLOOD PRESSURE: 90 MMHG | OXYGEN SATURATION: 100 % | SYSTOLIC BLOOD PRESSURE: 128 MMHG

## 2022-07-22 PROCEDURE — G0299 HHS/HOSPICE OF RN EA 15 MIN: HCPCS

## 2022-07-23 NOTE — HOME HEALTH
History: Left sided breast cancer with bilateral masectomy in 2019, pericardial mass which resolved with chemo, stroke and HTN.  Presented to ED with bilat upper extremity swelling, left ankle swelling and right arm rash.   Arthrocentesis of left ankle revealed group C strep.  She had washout of ankle and ID reccomends 6 weeks of IV ceftriaxone with end date of 8/10/22.  She went to rehab and is now home.  She will need weekly labs and PICC dressing change. (CBC,CMP every Monday with results to Dr. Grossman at [phone])  Patient did well with IV admin, we will continue to monitor her progress.  Patient having hip pain from previous replacement, she would like PT eval since she is still feeling weak, will ask MD for order Monday.  Most meds not in home, not available yet from pharmacy per patient, check at next visit.  CP assessment WNL.

## 2022-07-25 ENCOUNTER — HOME CARE VISIT (OUTPATIENT)
Dept: HOME HEALTH SERVICES | Facility: HOME HEALTHCARE | Age: 62
End: 2022-07-25

## 2022-07-25 ENCOUNTER — LAB REQUISITION (OUTPATIENT)
Dept: LAB | Facility: HOSPITAL | Age: 62
End: 2022-07-25

## 2022-07-25 VITALS
RESPIRATION RATE: 18 BRPM | HEART RATE: 68 BPM | TEMPERATURE: 96.5 F | OXYGEN SATURATION: 99 % | SYSTOLIC BLOOD PRESSURE: 118 MMHG | DIASTOLIC BLOOD PRESSURE: 80 MMHG

## 2022-07-25 DIAGNOSIS — Z00.00 ENCOUNTER FOR GENERAL ADULT MEDICAL EXAMINATION WITHOUT ABNORMAL FINDINGS: ICD-10-CM

## 2022-07-25 LAB
ALBUMIN SERPL-MCNC: 4 G/DL (ref 3.5–5.2)
ALBUMIN/GLOB SERPL: 1.5 G/DL
ALP SERPL-CCNC: 115 U/L (ref 39–117)
ALT SERPL W P-5'-P-CCNC: 26 U/L (ref 1–33)
ANION GAP SERPL CALCULATED.3IONS-SCNC: 11 MMOL/L (ref 5–15)
AST SERPL-CCNC: 23 U/L (ref 1–32)
BASOPHILS # BLD AUTO: 0.05 10*3/MM3 (ref 0–0.2)
BASOPHILS NFR BLD AUTO: 1 % (ref 0–1.5)
BILIRUB SERPL-MCNC: <0.2 MG/DL (ref 0–1.2)
BUN SERPL-MCNC: 15 MG/DL (ref 8–23)
BUN/CREAT SERPL: 20.5 (ref 7–25)
CALCIUM SPEC-SCNC: 9.3 MG/DL (ref 8.6–10.5)
CHLORIDE SERPL-SCNC: 107 MMOL/L (ref 98–107)
CO2 SERPL-SCNC: 25 MMOL/L (ref 22–29)
CREAT SERPL-MCNC: 0.73 MG/DL (ref 0.57–1)
DEPRECATED RDW RBC AUTO: 40.8 FL (ref 37–54)
EGFRCR SERPLBLD CKD-EPI 2021: 92 ML/MIN/1.73
EOSINOPHIL # BLD AUTO: 0.08 10*3/MM3 (ref 0–0.4)
EOSINOPHIL NFR BLD AUTO: 1.6 % (ref 0.3–6.2)
ERYTHROCYTE [DISTWIDTH] IN BLOOD BY AUTOMATED COUNT: 12.8 % (ref 12.3–15.4)
GLOBULIN UR ELPH-MCNC: 2.6 GM/DL
GLUCOSE SERPL-MCNC: 143 MG/DL (ref 65–99)
HCT VFR BLD AUTO: 32.6 % (ref 34–46.6)
HGB BLD-MCNC: 10.8 G/DL (ref 12–15.9)
IMM GRANULOCYTES # BLD AUTO: 0.01 10*3/MM3 (ref 0–0.05)
IMM GRANULOCYTES NFR BLD AUTO: 0.2 % (ref 0–0.5)
LYMPHOCYTES # BLD AUTO: 1.1 10*3/MM3 (ref 0.7–3.1)
LYMPHOCYTES NFR BLD AUTO: 22.2 % (ref 19.6–45.3)
MCH RBC QN AUTO: 30.1 PG (ref 26.6–33)
MCHC RBC AUTO-ENTMCNC: 33.1 G/DL (ref 31.5–35.7)
MCV RBC AUTO: 90.8 FL (ref 79–97)
MONOCYTES # BLD AUTO: 0.3 10*3/MM3 (ref 0.1–0.9)
MONOCYTES NFR BLD AUTO: 6.1 % (ref 5–12)
NEUTROPHILS NFR BLD AUTO: 3.41 10*3/MM3 (ref 1.7–7)
NEUTROPHILS NFR BLD AUTO: 68.9 % (ref 42.7–76)
NRBC BLD AUTO-RTO: 0 /100 WBC (ref 0–0.2)
PLATELET # BLD AUTO: 229 10*3/MM3 (ref 140–450)
PMV BLD AUTO: 8.8 FL (ref 6–12)
POTASSIUM SERPL-SCNC: 4.4 MMOL/L (ref 3.5–5.2)
PROT SERPL-MCNC: 6.6 G/DL (ref 6–8.5)
RBC # BLD AUTO: 3.59 10*6/MM3 (ref 3.77–5.28)
SODIUM SERPL-SCNC: 143 MMOL/L (ref 136–145)
WBC NRBC COR # BLD: 4.95 10*3/MM3 (ref 3.4–10.8)

## 2022-07-25 PROCEDURE — 80053 COMPREHEN METABOLIC PANEL: CPT | Performed by: INTERNAL MEDICINE

## 2022-07-25 PROCEDURE — G0299 HHS/HOSPICE OF RN EA 15 MIN: HCPCS

## 2022-07-25 PROCEDURE — 85025 COMPLETE CBC W/AUTO DIFF WBC: CPT | Performed by: INTERNAL MEDICINE

## 2022-07-25 NOTE — HOME HEALTH
PICC line dressing/extension changed in sterile fashion.  Labs obtained and taken to [facility] lab.  CP assessment WNL.  Patient has obtained all meds except eliquis which she is obtaining today.  Still complaining of significant left hip pain, will alert MD.

## 2022-07-26 DIAGNOSIS — Z09 SURGERY FOLLOW-UP: Primary | ICD-10-CM

## 2022-07-26 RX ORDER — HYDROCODONE BITARTRATE AND ACETAMINOPHEN 7.5; 325 MG/1; MG/1
TABLET ORAL
Qty: 45 TABLET | Refills: 0 | Status: CANCELLED | OUTPATIENT
Start: 2022-07-26

## 2022-07-26 RX ORDER — HYDROCODONE BITARTRATE AND ACETAMINOPHEN 7.5; 325 MG/1; MG/1
1 TABLET ORAL EVERY 8 HOURS PRN
Qty: 40 TABLET | Refills: 0 | Status: SHIPPED | OUTPATIENT
Start: 2022-07-26 | End: 2022-07-26 | Stop reason: SDUPTHER

## 2022-07-26 RX ORDER — HYDROCODONE BITARTRATE AND ACETAMINOPHEN 7.5; 325 MG/1; MG/1
1 TABLET ORAL EVERY 8 HOURS PRN
Qty: 40 TABLET | Refills: 0 | Status: CANCELLED | OUTPATIENT
Start: 2022-07-26

## 2022-07-26 RX ORDER — HYDROCODONE BITARTRATE AND ACETAMINOPHEN 7.5; 325 MG/1; MG/1
1 TABLET ORAL EVERY 8 HOURS PRN
Qty: 40 TABLET | Refills: 0 | Status: SHIPPED | OUTPATIENT
Start: 2022-07-26 | End: 2022-08-16

## 2022-07-26 RX ORDER — HYDROCODONE BITARTRATE AND ACETAMINOPHEN 7.5; 325 MG/1; MG/1
TABLET ORAL
Qty: 40 TABLET | Refills: 0 | Status: CANCELLED | OUTPATIENT
Start: 2022-07-26

## 2022-07-26 NOTE — TELEPHONE ENCOUNTER
Do you remember this patient I would you have time to call her just want to make sure we do not need to be doing something else.  I think she is already on blood thinner so clot would be less of a worry

## 2022-07-26 NOTE — TELEPHONE ENCOUNTER
I do remember her.  She was treated for septic ankle and also saw JW for discitis.  She had new onset of Afib and echo showed a PFO.  She is ok to refill pain meds, but probably check with cardiology about BLE edema.  Doppler done 6/27/22 was negative for DVT

## 2022-07-26 NOTE — TELEPHONE ENCOUNTER
DR ALEXANDER OUT OF OFFICE THIS WEEK. PLEASE PUT IN QUANTITY. NOT SURE HOW MUCH TO PUT. PLEASE SEE PATIENTS NOTE BELOW

## 2022-07-26 NOTE — TELEPHONE ENCOUNTER
----- Message from Marylu Georges sent at 7/26/2022 11:44 AM EDT -----  Regarding: Pain medicine  Will run out of Norco before my appt on 8/11 Have quite alot of  pain in my left hip  [facility]  [facility]. I believe it is some sort of strain. I  also have alot of swelling  in both my  left ankle and calf. Ice helps but pain in the hip makes it difficult to get it iced. Need a sleeve or elastic  sock to help with controlling swelling.  Please advise

## 2022-07-26 NOTE — TELEPHONE ENCOUNTER
She had swelling in left ankle while in the hospital.  Needs to work on ROM.  Could be OK for Compression stocking.

## 2022-07-26 NOTE — TELEPHONE ENCOUNTER
I cannot get this medicine to E scribe can somebody help me I was going to keep her at hydrocodone 7.5 but make it 1 every 8 hours

## 2022-07-27 ENCOUNTER — HOME CARE VISIT (OUTPATIENT)
Dept: HOME HEALTH SERVICES | Facility: HOME HEALTHCARE | Age: 62
End: 2022-07-27

## 2022-07-27 PROCEDURE — G0151 HHCP-SERV OF PT,EA 15 MIN: HCPCS

## 2022-07-28 VITALS
HEART RATE: 54 BPM | SYSTOLIC BLOOD PRESSURE: 134 MMHG | OXYGEN SATURATION: 98 % | DIASTOLIC BLOOD PRESSURE: 80 MMHG | TEMPERATURE: 98.2 F

## 2022-07-28 NOTE — HOME HEALTH
"Patient developed left sided ankle redness and found to have infection.  She subsequently presented with B UE swelling and found to have septic arthritis in multiple areas on the left side.  Etiology unkown.  She also devolped A-fib with suspected endocarditis.  She went to [facility] rehab and stated she was doing fairly well, until the last couple of days when she felt they \"pushed her too hard\" and she developed left hip pain that persists today and the main reason for home PT referral.  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      Assessment:  Her main complaint is the left hip pain.  Her pain is mainly on the lateral side of the hip from the greater trochanter to about the first 1/3 of the thigh.  The pain does refer into the front of the thigh and the posterior pelvis/mid buttock area at times.  She reports occassional (deep) groin discomfort as well, but not often.  She was not tender to palpation to her later thigh/IT Band.  She walks around the house with no AD and has a moderate antalgic limp.  She is still able to do her ADLs on her own, just pain limiting. Home PT will focus on her left hip pain and try to guide better pain relief there and to also provide better guidance to her HEP.      Plan for next visit  1.  Amend HEP and gait program as needed for best left hip pain management."

## 2022-08-01 ENCOUNTER — LAB REQUISITION (OUTPATIENT)
Dept: LAB | Facility: HOSPITAL | Age: 62
End: 2022-08-01

## 2022-08-01 ENCOUNTER — HOME CARE VISIT (OUTPATIENT)
Dept: HOME HEALTH SERVICES | Facility: HOME HEALTHCARE | Age: 62
End: 2022-08-01

## 2022-08-01 VITALS
OXYGEN SATURATION: 97 % | SYSTOLIC BLOOD PRESSURE: 140 MMHG | RESPIRATION RATE: 18 BRPM | TEMPERATURE: 96.9 F | HEART RATE: 57 BPM | DIASTOLIC BLOOD PRESSURE: 100 MMHG

## 2022-08-01 DIAGNOSIS — Z00.00 ENCOUNTER FOR GENERAL ADULT MEDICAL EXAMINATION WITHOUT ABNORMAL FINDINGS: ICD-10-CM

## 2022-08-01 LAB
ALBUMIN SERPL-MCNC: 4 G/DL (ref 3.5–5.2)
ALBUMIN/GLOB SERPL: 1.4 G/DL
ALP SERPL-CCNC: 104 U/L (ref 39–117)
ALT SERPL W P-5'-P-CCNC: 26 U/L (ref 1–33)
ANION GAP SERPL CALCULATED.3IONS-SCNC: 12.5 MMOL/L (ref 5–15)
AST SERPL-CCNC: 24 U/L (ref 1–32)
BASOPHILS # BLD AUTO: 0.05 10*3/MM3 (ref 0–0.2)
BASOPHILS NFR BLD AUTO: 0.9 % (ref 0–1.5)
BILIRUB SERPL-MCNC: <0.2 MG/DL (ref 0–1.2)
BUN SERPL-MCNC: 19 MG/DL (ref 8–23)
BUN/CREAT SERPL: 22.4 (ref 7–25)
CALCIUM SPEC-SCNC: 9.3 MG/DL (ref 8.6–10.5)
CHLORIDE SERPL-SCNC: 101 MMOL/L (ref 98–107)
CO2 SERPL-SCNC: 24.5 MMOL/L (ref 22–29)
CREAT SERPL-MCNC: 0.85 MG/DL (ref 0.57–1)
DEPRECATED RDW RBC AUTO: 45.8 FL (ref 37–54)
EGFRCR SERPLBLD CKD-EPI 2021: 76.6 ML/MIN/1.73
EOSINOPHIL # BLD AUTO: 0.11 10*3/MM3 (ref 0–0.4)
EOSINOPHIL NFR BLD AUTO: 2.1 % (ref 0.3–6.2)
ERYTHROCYTE [DISTWIDTH] IN BLOOD BY AUTOMATED COUNT: 13.4 % (ref 12.3–15.4)
GLOBULIN UR ELPH-MCNC: 2.8 GM/DL
GLUCOSE SERPL-MCNC: 116 MG/DL (ref 65–99)
HCT VFR BLD AUTO: 38.4 % (ref 34–46.6)
HGB BLD-MCNC: 12 G/DL (ref 12–15.9)
IMM GRANULOCYTES # BLD AUTO: 0.02 10*3/MM3 (ref 0–0.05)
IMM GRANULOCYTES NFR BLD AUTO: 0.4 % (ref 0–0.5)
LYMPHOCYTES # BLD AUTO: 1.01 10*3/MM3 (ref 0.7–3.1)
LYMPHOCYTES NFR BLD AUTO: 19.1 % (ref 19.6–45.3)
MCH RBC QN AUTO: 29.4 PG (ref 26.6–33)
MCHC RBC AUTO-ENTMCNC: 31.3 G/DL (ref 31.5–35.7)
MCV RBC AUTO: 94.1 FL (ref 79–97)
MONOCYTES # BLD AUTO: 0.4 10*3/MM3 (ref 0.1–0.9)
MONOCYTES NFR BLD AUTO: 7.6 % (ref 5–12)
NEUTROPHILS NFR BLD AUTO: 3.7 10*3/MM3 (ref 1.7–7)
NEUTROPHILS NFR BLD AUTO: 69.9 % (ref 42.7–76)
NRBC BLD AUTO-RTO: 0 /100 WBC (ref 0–0.2)
PLATELET # BLD AUTO: 211 10*3/MM3 (ref 140–450)
PMV BLD AUTO: 9.1 FL (ref 6–12)
POTASSIUM SERPL-SCNC: 4.6 MMOL/L (ref 3.5–5.2)
PROT SERPL-MCNC: 6.8 G/DL (ref 6–8.5)
RBC # BLD AUTO: 4.08 10*6/MM3 (ref 3.77–5.28)
SODIUM SERPL-SCNC: 138 MMOL/L (ref 136–145)
WBC NRBC COR # BLD: 5.29 10*3/MM3 (ref 3.4–10.8)

## 2022-08-01 PROCEDURE — 85025 COMPLETE CBC W/AUTO DIFF WBC: CPT | Performed by: INTERNAL MEDICINE

## 2022-08-01 PROCEDURE — 80053 COMPREHEN METABOLIC PANEL: CPT | Performed by: INTERNAL MEDICINE

## 2022-08-01 PROCEDURE — G0299 HHS/HOSPICE OF RN EA 15 MIN: HCPCS

## 2022-08-02 ENCOUNTER — HOME CARE VISIT (OUTPATIENT)
Dept: HOME HEALTH SERVICES | Facility: HOME HEALTHCARE | Age: 62
End: 2022-08-02

## 2022-08-02 PROCEDURE — G0151 HHCP-SERV OF PT,EA 15 MIN: HCPCS

## 2022-08-02 NOTE — HOME HEALTH
Changed PICC dressing in sterile fashion.  Obtained labs and taken to [facility] lab.  Patient reports hip pain is improving.

## 2022-08-03 VITALS — OXYGEN SATURATION: 97 % | TEMPERATURE: 98.5 F | HEART RATE: 63 BPM

## 2022-08-03 NOTE — HOME HEALTH
Patient reports she has started to feel better the last 2 to 3 days.  Pain is down and her able to do more of her ADLs easier.  She has limited her walking and has done her exercises as prescribed.    Plan for next visit  1.  Progress gait duration as able.  Amend and progress HEP as needed for left LE pain and strengthening.

## 2022-08-04 ENCOUNTER — HOME CARE VISIT (OUTPATIENT)
Dept: HOME HEALTH SERVICES | Facility: HOME HEALTHCARE | Age: 62
End: 2022-08-04

## 2022-08-04 PROCEDURE — G0151 HHCP-SERV OF PT,EA 15 MIN: HCPCS

## 2022-08-06 VITALS — OXYGEN SATURATION: 99 % | HEART RATE: 60 BPM | SYSTOLIC BLOOD PRESSURE: 120 MMHG | DIASTOLIC BLOOD PRESSURE: 84 MMHG

## 2022-08-06 NOTE — HOME HEALTH
Patient states her left hip/leg pain is better but she seems to have aggrevated her right heel cord with the exercises.  She has still been able to walk, but limited distances without the cane.  She states that she is doing the supine HEP first thing in the morning and sometimes sitting on the couch.      Plan for next visit  1.  Finalize HEP and do PT dc assessment.

## 2022-08-08 ENCOUNTER — HOME CARE VISIT (OUTPATIENT)
Dept: HOME HEALTH SERVICES | Facility: HOME HEALTHCARE | Age: 62
End: 2022-08-08

## 2022-08-08 ENCOUNTER — LAB REQUISITION (OUTPATIENT)
Dept: LAB | Facility: HOSPITAL | Age: 62
End: 2022-08-08

## 2022-08-08 VITALS
OXYGEN SATURATION: 98 % | DIASTOLIC BLOOD PRESSURE: 90 MMHG | SYSTOLIC BLOOD PRESSURE: 110 MMHG | HEART RATE: 73 BPM | TEMPERATURE: 96.5 F | RESPIRATION RATE: 18 BRPM

## 2022-08-08 DIAGNOSIS — Z00.00 ENCOUNTER FOR GENERAL ADULT MEDICAL EXAMINATION WITHOUT ABNORMAL FINDINGS: ICD-10-CM

## 2022-08-08 LAB
ALBUMIN SERPL-MCNC: 4.3 G/DL (ref 3.5–5.2)
ALBUMIN/GLOB SERPL: 1.9 G/DL
ALP SERPL-CCNC: 111 U/L (ref 39–117)
ALT SERPL W P-5'-P-CCNC: 19 U/L (ref 1–33)
ANION GAP SERPL CALCULATED.3IONS-SCNC: 12 MMOL/L (ref 5–15)
AST SERPL-CCNC: 25 U/L (ref 1–32)
BASOPHILS # BLD AUTO: 0.04 10*3/MM3 (ref 0–0.2)
BASOPHILS NFR BLD AUTO: 1 % (ref 0–1.5)
BILIRUB SERPL-MCNC: 0.3 MG/DL (ref 0–1.2)
BUN SERPL-MCNC: 19 MG/DL (ref 8–23)
BUN/CREAT SERPL: 26.8 (ref 7–25)
CALCIUM SPEC-SCNC: 9.6 MG/DL (ref 8.6–10.5)
CHLORIDE SERPL-SCNC: 104 MMOL/L (ref 98–107)
CO2 SERPL-SCNC: 24 MMOL/L (ref 22–29)
CREAT SERPL-MCNC: 0.71 MG/DL (ref 0.57–1)
DEPRECATED RDW RBC AUTO: 45.2 FL (ref 37–54)
EGFRCR SERPLBLD CKD-EPI 2021: 95.1 ML/MIN/1.73
EOSINOPHIL # BLD AUTO: 0.09 10*3/MM3 (ref 0–0.4)
EOSINOPHIL NFR BLD AUTO: 2.3 % (ref 0.3–6.2)
ERYTHROCYTE [DISTWIDTH] IN BLOOD BY AUTOMATED COUNT: 13.4 % (ref 12.3–15.4)
GLOBULIN UR ELPH-MCNC: 2.3 GM/DL
GLUCOSE SERPL-MCNC: 138 MG/DL (ref 65–99)
HCT VFR BLD AUTO: 39.4 % (ref 34–46.6)
HGB BLD-MCNC: 12.6 G/DL (ref 12–15.9)
IMM GRANULOCYTES # BLD AUTO: 0.01 10*3/MM3 (ref 0–0.05)
IMM GRANULOCYTES NFR BLD AUTO: 0.3 % (ref 0–0.5)
LYMPHOCYTES # BLD AUTO: 0.85 10*3/MM3 (ref 0.7–3.1)
LYMPHOCYTES NFR BLD AUTO: 21.8 % (ref 19.6–45.3)
MCH RBC QN AUTO: 29.6 PG (ref 26.6–33)
MCHC RBC AUTO-ENTMCNC: 32 G/DL (ref 31.5–35.7)
MCV RBC AUTO: 92.5 FL (ref 79–97)
MONOCYTES # BLD AUTO: 0.27 10*3/MM3 (ref 0.1–0.9)
MONOCYTES NFR BLD AUTO: 6.9 % (ref 5–12)
NEUTROPHILS NFR BLD AUTO: 2.64 10*3/MM3 (ref 1.7–7)
NEUTROPHILS NFR BLD AUTO: 67.7 % (ref 42.7–76)
NRBC BLD AUTO-RTO: 0 /100 WBC (ref 0–0.2)
PLATELET # BLD AUTO: 192 10*3/MM3 (ref 140–450)
PMV BLD AUTO: 9.3 FL (ref 6–12)
POTASSIUM SERPL-SCNC: 4.8 MMOL/L (ref 3.5–5.2)
PROT SERPL-MCNC: 6.6 G/DL (ref 6–8.5)
RBC # BLD AUTO: 4.26 10*6/MM3 (ref 3.77–5.28)
SODIUM SERPL-SCNC: 140 MMOL/L (ref 136–145)
WBC NRBC COR # BLD: 3.9 10*3/MM3 (ref 3.4–10.8)

## 2022-08-08 PROCEDURE — G0299 HHS/HOSPICE OF RN EA 15 MIN: HCPCS

## 2022-08-08 PROCEDURE — 85025 COMPLETE CBC W/AUTO DIFF WBC: CPT | Performed by: INTERNAL MEDICINE

## 2022-08-08 PROCEDURE — 80053 COMPREHEN METABOLIC PANEL: CPT | Performed by: INTERNAL MEDICINE

## 2022-08-08 NOTE — HOME HEALTH
Labs collected, dressing changed in sterile fashion.  Patient has follow up with ID on Wednesday and finishes abx same day.  Informed to call HH office if they do not remove PICC line, otherwise patient will be seen next Monday for d/c.  Called I as patient only had one saline syringe left, they state they will deliver by end of day.  CP assessment WNL.

## 2022-08-09 ENCOUNTER — HOME CARE VISIT (OUTPATIENT)
Dept: HOME HEALTH SERVICES | Facility: HOME HEALTHCARE | Age: 62
End: 2022-08-09

## 2022-08-09 PROCEDURE — G0151 HHCP-SERV OF PT,EA 15 MIN: HCPCS

## 2022-08-10 ENCOUNTER — OFFICE VISIT (OUTPATIENT)
Dept: INFECTIOUS DISEASES | Facility: CLINIC | Age: 62
End: 2022-08-10

## 2022-08-10 VITALS
SYSTOLIC BLOOD PRESSURE: 139 MMHG | WEIGHT: 157.2 LBS | RESPIRATION RATE: 18 BRPM | HEIGHT: 66 IN | BODY MASS INDEX: 25.26 KG/M2 | TEMPERATURE: 97.9 F | HEART RATE: 59 BPM | DIASTOLIC BLOOD PRESSURE: 86 MMHG

## 2022-08-10 DIAGNOSIS — I48.19 PERSISTENT ATRIAL FIBRILLATION: ICD-10-CM

## 2022-08-10 DIAGNOSIS — Z79.2 LONG TERM (CURRENT) USE OF ANTIBIOTICS: ICD-10-CM

## 2022-08-10 DIAGNOSIS — M46.46 LUMBAR DISCITIS: ICD-10-CM

## 2022-08-10 DIAGNOSIS — M00.29 STREPTOCOCCAL ARTHRITIS OF MULTIPLE SITES: Primary | ICD-10-CM

## 2022-08-10 PROCEDURE — 99214 OFFICE O/P EST MOD 30 MIN: CPT | Performed by: INTERNAL MEDICINE

## 2022-08-10 NOTE — PROGRESS NOTES
"cc: Follow-up septic arthritis    Briefly, patient was hospitalized with polyarthritis.  Was found to have left septic arthritis due to group C strep status post I&D on 6/30.  Also found to have lumbar discitis, left hand septic arthritis of left shoulder septic arthritis which were all managed medically at the recommendations of hand, spine and orthopedic surgery.    Had suspected endocarditis but no clear vegetation on surface echo and valve was in good shape so did not pursue KENNETH.    Since discharge, she reports that she is done pretty well.  Her back and left upper extremity and left ankle overall improved.  She did have some left hand pain following mechanical injury at her rehab.  He says this is improved.  She is following up with her orthopedist tomorrow for evaluation.  Tolerating long-term use antibiotics without missed doses or side effects.        Blood pressure 139/86, pulse 59, temperature 97.9 °F (36.6 °C), resp. rate 18, height 167.6 cm (66\"), weight 71.3 kg (157 lb 3.2 oz), not currently breastfeeding.  GENERAL: Awake and alert, in no acute distress.   HEART: Regular rate and rhythm. No peripheral edema.   LUNGS:  normal respiratory effort.   SKIN: Warm and dry without cutaneous eruptions.  No active synovitis of left shoulder, left hand or left ankle.  Back nontender  PSYCHIATRIC: Appropriate mood, affect, insight, and judgment.       DIAGNOSTICS:  Antibiotic monitoring labs reviewed, creatinine 0.71, white count 3.9, hematocrit 39.4, platelets 192      Assessment and Plan  1.  Septic arthritis L ankle d/t Group C strep, s/p I and D on 6/30  2.  Left shoulder septic arthritis  3.  Lumbar discitis  4.  Left hand septic arthritis  5.  Aortic valve calcification with suspected endocarditis, no clear vegetation and with A. fib, will refer back to cardiology.  May need to repeat echo?  6.  Lymphedema, may have been etiology infection.  Discussed compression wraps    Infectious issues doing much better.  " She is status post 6 weeks of IV antibiotics and polyarticular septic arthritis appears resolved.  No active synovitis on exam today.  We will go ahead and discontinue her IV antibiotics and PICC line.  She did not have debridement of all her joints and therefore is at risk for recrudescent infection.  I discussed this with her today.  We discussed signs and symptoms of recrudescent infection and when to seek medical attention.

## 2022-08-11 ENCOUNTER — OFFICE VISIT (OUTPATIENT)
Dept: ORTHOPEDIC SURGERY | Facility: CLINIC | Age: 62
End: 2022-08-11

## 2022-08-11 VITALS
TEMPERATURE: 99 F | SYSTOLIC BLOOD PRESSURE: 118 MMHG | HEART RATE: 63 BPM | OXYGEN SATURATION: 99 % | DIASTOLIC BLOOD PRESSURE: 64 MMHG

## 2022-08-11 VITALS — BODY MASS INDEX: 25.3 KG/M2 | TEMPERATURE: 97.1 F | WEIGHT: 157.4 LBS | HEIGHT: 66 IN

## 2022-08-11 DIAGNOSIS — Z09 SURGERY FOLLOW-UP: Primary | ICD-10-CM

## 2022-08-11 DIAGNOSIS — R52 PAIN: ICD-10-CM

## 2022-08-11 PROCEDURE — 99024 POSTOP FOLLOW-UP VISIT: CPT | Performed by: ORTHOPAEDIC SURGERY

## 2022-08-11 PROCEDURE — 73610 X-RAY EXAM OF ANKLE: CPT | Performed by: ORTHOPAEDIC SURGERY

## 2022-08-11 RX ORDER — ACETAMINOPHEN 325 MG/1
650 TABLET ORAL
COMMUNITY
Start: 2022-07-20 | End: 2022-08-16 | Stop reason: SDUPTHER

## 2022-08-11 RX ORDER — ECHINACEA PURPUREA EXTRACT 125 MG
TABLET ORAL
COMMUNITY
Start: 2022-07-20

## 2022-08-11 RX ORDER — IBUPROFEN 400 MG/1
400 TABLET ORAL
COMMUNITY
Start: 2022-07-20 | End: 2022-08-16

## 2022-08-11 RX ORDER — DIGOXIN 125 MCG
0.12 TABLET ORAL
COMMUNITY
Start: 2022-07-20 | End: 2022-08-16 | Stop reason: SDUPTHER

## 2022-08-11 RX ORDER — CEFTRIAXONE 2 G/1
INJECTION, POWDER, FOR SOLUTION INTRAMUSCULAR; INTRAVENOUS
COMMUNITY
Start: 2022-07-20 | End: 2022-08-16

## 2022-08-11 NOTE — PROGRESS NOTES
"Ankle Follow Up      Patient: Marylu Georges    YOB: 1960 62 y.o. female    Chief Complaints: Ankle pain    History of Present Illness:  HPI: Patient presents about 6 weeks status post irrigation debridement left ankle joint.  Patient is finished her IV antibiotics.  States she not have any ankle pain doing well working with therapy still working on ambulation but as far as ankle range of motion and pain is back to baseline.  Overall she feels like she is doing well she had had some irritation about her hip she stated but when she does not do certain exercises there is no pain so she is avoiding those but continue to work with therapy.  Also follow-up with her cardiologist in regards to Eliquis and also for her primary care doctor.      Past Medical History:   Diagnosis Date   • Anemia in neoplastic disease    • Arthritis    • Arthritis of back    • Arthritis of neck    • Breast cancer (HCC)     Left   • CVA (cerebral vascular accident) (HCC)    • Fracture, fibula    • Fracture, finger    • Hip arthrosis 01/17   • Hip pain     RIGHT HIP... CYST   • History of fracture of leg 1987   • History of radiation therapy     LAST TREATMENT     • Hypertension    • Knee swelling    • Limited joint range of motion (ROM)     RIGHT HIP   • Low back strain    • Periarthritis of shoulder    • Rotator cuff syndrome 6/22   • Skin sore     OPEN SORE LEFT BREAST   • Syncope    • Vertigo        Physical Exam:   Vitals:    08/11/22 1325   Temp: 97.1 °F (36.2 °C)   Weight: 71.4 kg (157 lb 6.4 oz)   Height: 167.6 cm (66\")     Well developed with normal mood.  Left lower extremity examined incision is well-healed.  No swelling, skin discoloration or tenderness about the ankle.  Ankle range of motion is full and painless.  Motor and sensory intact distally.  Still some slight discoloration about the foot but 2+ cap refill.  Palpable pulses.  Compartments are soft and compressible.    Radiology:  3 views of the left " ankle taken reviewed show previous distal tibial shaft healed fracture.  Ankle joint does not show any erosive changes or soft tissue changes.  No swelling appreciated.    Assessment/Plan:    6-week status post irrigation and debridement of left ankle    Patient seems to be progressing well ankle shows no pain or swelling or signs of infection.  She is finished IV antibiotics.  Recommend she continue therapy to work on function and progression of regular activity.  Patient may follow-up as needed.  All questions were answered.  Patient understands and agrees with the plan.

## 2022-08-11 NOTE — HOME HEALTH
"Patient reports doing \"better\"  and \"getting around better.\"  She has gone out a couple times.  She is using her cane when leaving the house but not using the cane in the house. She is ready for home health dc and will pursue outpt PT once the nurse discharges her." Applied

## 2022-08-15 ENCOUNTER — HOME CARE VISIT (OUTPATIENT)
Dept: HOME HEALTH SERVICES | Facility: HOME HEALTHCARE | Age: 62
End: 2022-08-15

## 2022-08-15 VITALS
TEMPERATURE: 96.3 F | SYSTOLIC BLOOD PRESSURE: 144 MMHG | DIASTOLIC BLOOD PRESSURE: 94 MMHG | RESPIRATION RATE: 18 BRPM | HEART RATE: 55 BPM | OXYGEN SATURATION: 100 %

## 2022-08-15 PROCEDURE — G0299 HHS/HOSPICE OF RN EA 15 MIN: HCPCS

## 2022-08-15 NOTE — HOME HEALTH
PICC removed in ID office, patient completed abx.  No further questions from patient, agreeable to d/c.

## 2022-08-16 ENCOUNTER — OFFICE VISIT (OUTPATIENT)
Dept: ONCOLOGY | Facility: CLINIC | Age: 62
End: 2022-08-16

## 2022-08-16 ENCOUNTER — TELEPHONE (OUTPATIENT)
Dept: ONCOLOGY | Facility: CLINIC | Age: 62
End: 2022-08-16

## 2022-08-16 ENCOUNTER — LAB (OUTPATIENT)
Dept: LAB | Facility: HOSPITAL | Age: 62
End: 2022-08-16

## 2022-08-16 VITALS
SYSTOLIC BLOOD PRESSURE: 145 MMHG | DIASTOLIC BLOOD PRESSURE: 94 MMHG | WEIGHT: 152.8 LBS | OXYGEN SATURATION: 98 % | HEART RATE: 65 BPM | HEIGHT: 66 IN | RESPIRATION RATE: 18 BRPM | BODY MASS INDEX: 24.56 KG/M2 | TEMPERATURE: 97.3 F

## 2022-08-16 DIAGNOSIS — K71.6 DRUG-INDUCED HEPATITIS: ICD-10-CM

## 2022-08-16 DIAGNOSIS — Z17.0 MALIGNANT NEOPLASM OF OVERLAPPING SITES OF BOTH BREASTS IN FEMALE, ESTROGEN RECEPTOR POSITIVE: ICD-10-CM

## 2022-08-16 DIAGNOSIS — C50.812 MALIGNANT NEOPLASM OF OVERLAPPING SITES OF BOTH BREASTS IN FEMALE, ESTROGEN RECEPTOR POSITIVE: ICD-10-CM

## 2022-08-16 DIAGNOSIS — C50.812 MALIGNANT NEOPLASM OF OVERLAPPING SITES OF LEFT BREAST IN FEMALE, ESTROGEN RECEPTOR POSITIVE: Primary | ICD-10-CM

## 2022-08-16 DIAGNOSIS — Z17.0 MALIGNANT NEOPLASM OF OVERLAPPING SITES OF LEFT BREAST IN FEMALE, ESTROGEN RECEPTOR POSITIVE: ICD-10-CM

## 2022-08-16 DIAGNOSIS — Z17.0 MALIGNANT NEOPLASM OF OVERLAPPING SITES OF LEFT BREAST IN FEMALE, ESTROGEN RECEPTOR POSITIVE: Primary | ICD-10-CM

## 2022-08-16 DIAGNOSIS — Z79.899 ENCOUNTER FOR LONG-TERM (CURRENT) USE OF OTHER MEDICATIONS: ICD-10-CM

## 2022-08-16 DIAGNOSIS — T50.905A DRUG-INDUCED HEPATITIS: ICD-10-CM

## 2022-08-16 DIAGNOSIS — C50.811 MALIGNANT NEOPLASM OF OVERLAPPING SITES OF BOTH BREASTS IN FEMALE, ESTROGEN RECEPTOR POSITIVE: ICD-10-CM

## 2022-08-16 DIAGNOSIS — I10 PRIMARY HYPERTENSION: ICD-10-CM

## 2022-08-16 DIAGNOSIS — C50.812 MALIGNANT NEOPLASM OF OVERLAPPING SITES OF LEFT BREAST IN FEMALE, ESTROGEN RECEPTOR POSITIVE: ICD-10-CM

## 2022-08-16 LAB
ALBUMIN SERPL-MCNC: 4.8 G/DL (ref 3.5–5.2)
ALBUMIN/GLOB SERPL: 1.8 G/DL (ref 1.1–2.4)
ALP SERPL-CCNC: 120 U/L (ref 38–116)
ALT SERPL W P-5'-P-CCNC: 16 U/L (ref 0–33)
ANION GAP SERPL CALCULATED.3IONS-SCNC: 12 MMOL/L (ref 5–15)
AST SERPL-CCNC: 20 U/L (ref 0–32)
BASOPHILS # BLD AUTO: 0.04 10*3/MM3 (ref 0–0.2)
BASOPHILS NFR BLD AUTO: 0.8 % (ref 0–1.5)
BILIRUB SERPL-MCNC: 0.5 MG/DL (ref 0.2–1.2)
BUN SERPL-MCNC: 19 MG/DL (ref 6–20)
BUN/CREAT SERPL: 22.1 (ref 7.3–30)
CALCIUM SPEC-SCNC: 10.3 MG/DL (ref 8.5–10.2)
CANCER AG15-3 SERPL-ACNC: 20 U/ML
CHLORIDE SERPL-SCNC: 105 MMOL/L (ref 98–107)
CO2 SERPL-SCNC: 24 MMOL/L (ref 22–29)
CREAT SERPL-MCNC: 0.86 MG/DL (ref 0.6–1.1)
DEPRECATED RDW RBC AUTO: 49.9 FL (ref 37–54)
EGFRCR SERPLBLD CKD-EPI 2021: 75.5 ML/MIN/1.73
EOSINOPHIL # BLD AUTO: 0.08 10*3/MM3 (ref 0–0.4)
EOSINOPHIL NFR BLD AUTO: 1.7 % (ref 0.3–6.2)
ERYTHROCYTE [DISTWIDTH] IN BLOOD BY AUTOMATED COUNT: 14.1 % (ref 12.3–15.4)
GLOBULIN UR ELPH-MCNC: 2.7 GM/DL (ref 1.8–3.5)
GLUCOSE SERPL-MCNC: 112 MG/DL (ref 74–124)
HCT VFR BLD AUTO: 45.1 % (ref 34–46.6)
HGB BLD-MCNC: 13.7 G/DL (ref 12–15.9)
IMM GRANULOCYTES # BLD AUTO: 0.01 10*3/MM3 (ref 0–0.05)
IMM GRANULOCYTES NFR BLD AUTO: 0.2 % (ref 0–0.5)
LYMPHOCYTES # BLD AUTO: 1.29 10*3/MM3 (ref 0.7–3.1)
LYMPHOCYTES NFR BLD AUTO: 26.9 % (ref 19.6–45.3)
MCH RBC QN AUTO: 29 PG (ref 26.6–33)
MCHC RBC AUTO-ENTMCNC: 30.4 G/DL (ref 31.5–35.7)
MCV RBC AUTO: 95.3 FL (ref 79–97)
MONOCYTES # BLD AUTO: 0.34 10*3/MM3 (ref 0.1–0.9)
MONOCYTES NFR BLD AUTO: 7.1 % (ref 5–12)
NEUTROPHILS NFR BLD AUTO: 3.04 10*3/MM3 (ref 1.7–7)
NEUTROPHILS NFR BLD AUTO: 63.3 % (ref 42.7–76)
NRBC BLD AUTO-RTO: 0 /100 WBC (ref 0–0.2)
PLATELET # BLD AUTO: 210 10*3/MM3 (ref 140–450)
PMV BLD AUTO: 8.2 FL (ref 6–12)
POTASSIUM SERPL-SCNC: 4.3 MMOL/L (ref 3.5–4.7)
PROT SERPL-MCNC: 7.5 G/DL (ref 6.3–8)
RBC # BLD AUTO: 4.73 10*6/MM3 (ref 3.77–5.28)
SODIUM SERPL-SCNC: 141 MMOL/L (ref 134–145)
WBC NRBC COR # BLD: 4.8 10*3/MM3 (ref 3.4–10.8)

## 2022-08-16 PROCEDURE — 85025 COMPLETE CBC W/AUTO DIFF WBC: CPT

## 2022-08-16 PROCEDURE — 80053 COMPREHEN METABOLIC PANEL: CPT

## 2022-08-16 PROCEDURE — 99215 OFFICE O/P EST HI 40 MIN: CPT | Performed by: INTERNAL MEDICINE

## 2022-08-16 PROCEDURE — 36415 COLL VENOUS BLD VENIPUNCTURE: CPT

## 2022-08-16 PROCEDURE — 86300 IMMUNOASSAY TUMOR CA 15-3: CPT | Performed by: INTERNAL MEDICINE

## 2022-08-16 RX ORDER — METOPROLOL SUCCINATE 50 MG/1
50 TABLET, EXTENDED RELEASE ORAL 3 TIMES DAILY
COMMUNITY
Start: 2022-07-20 | End: 2022-09-21 | Stop reason: SDUPTHER

## 2022-08-16 NOTE — TELEPHONE ENCOUNTER
Caller: Marylu Georges    Relationship to patient: Self    Best call back number: [phone]    Chief complaint: NEEDING A DIFFERENT DAY     Type of visit:  LAB AND F/U     Requested date: SOME TIME EARLIER IN THAT  WEEK     If rescheduling, when is the original appointment: 09/16    Additional notes:

## 2022-08-16 NOTE — PROGRESS NOTES
Subjective     REASON FOR FOLLOW UP:  1. 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 R BREAST MASS WITH GIANT R AXILLARY ADENOPATHY.  SHE UNDERWENT DOSE DENSE AC, TAXOL, BILATERAL MASTECTOMIES, DRAMATIC NEAR COMPLETE KS, RADIATION THERAPY AND ADJUVANT FEMARA STOPPED ON 12/21 BECAUSE DRUG INDUCED HEPATITIS, SWITCHED TO AROMASIN 2/22: NO SIDE EFFECTS.    3. FAMILY HISTORY OF BREAST CANCER IN MOTHER AND SISTER, SISTER WAS TESTED FOR BRCA AND WAS NEGATIVE.    4. LEFT OVARIAN CYST , IT HAS BEEN PRESENT FOR LONG TIME BUT IS GETTING LARGER, ASYMPTOMATIC, NEED FOR , PELVIC US AND GYN ONC EVEAL                   5. LEFT HIP PAIN SEVERE ARTHRITIS, REAL NEED FOR LEFT HIP REPLACEMENT: PERFORMED 8/21    6. 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        DURING THE VISIT WITH THE PATIENT TODAY , PATIENT HAD FACE MASK, MY MEDICAL ASSISTANT AND I  HAD PROPPER PROTECTIVE EQUIPMENT, AND I DID HAND HYGIENE WITH SOAP AND WATER BEFORE AND AFTER THE VISIT.    On 08/16/2022 I had the opportunity to see this 62-year-old female who has history of bilateral breast cancer, Stage IV disease with a very large ulcerated lesion in the left breast that replaced the whole breast and also tumoral lesion in the right breast with axillary adenopathy in the right side. The patient underwent neoadjuvant chemotherapy with AC Taxol achieving a dramatic response, near complete pathological response at the time of the resection of bilateral mastectomies. The patient was followed up with aromatase inhibition. She encountered significant side effects of letrozole including drug-induced hepatitis documented through liver biopsy. Letrozole was discontinued and Aromasin was initiated. The patient has returned today to the office after she had developed an episode of fever and chills with profound fatigue, prostration,  pain and swelling in the left ankle in absence of any trauma and she was admitted to [facility] at that point. The patient was having fever, leukocytosis, anemia and she was diagnosed with septic arthritis on multiple levels. She required drainage in the left ankle. She has received antibiotic therapy IV through a PICC line in the right upper extremity until a week ago. Infectious Disease was involved in the care. The source of the infection never could be found. At the time that she was in the hospital she also developed very significant lymphedema in the left upper extremity leading me to believe that even though she had no cellulitis in the arm that maybe the source of infection happened from here and started from here. In any event, the patient has returned today stating that she still has undergone physical therapy. She still continues having joint pain especially in the left shoulder and in the left knee. That is the one that is more problematic. She has some degree of limping and she is using a cane. She is doing physical therapy just twice a week. She is not encountering any fevers or chills. No rashes in the skin. The lymphedema in the left upper extremity remains up and running with no infection and the sleeve that she uses is very small. She has not developed any lymphedema in the right upper extremity. She denies any cough or sputum production. Her heart during the episodes of septic arthritis also developed atrial fibrillation with rapid ventricular response. She was placed on digoxin and metoprolol. Echocardiogram documented a very severe dilatation of the left atrium and the patient was also anticoagulated with Eliquis. Since then her AFib has settled down in regard to controlled ventricular response. She remains on medications to control the rate as well. She has not had any clinical bleeding. The patient states that she has had good bowel activity. Appetite is not the best but she has been able  to gain a few pounds and urination is normal. No neurological symptomatology with the exception of a sinus headache that is now over.              Past Medical History:   Diagnosis Date    Anemia in neoplastic disease     Arthritis     Arthritis of back     Arthritis of neck     Breast cancer (HCC)     Left    CVA (cerebral vascular accident) (HCC)     Fracture, fibula     Fracture, finger     Hip arthrosis 01/17    Hip pain     RIGHT HIP... CYST    History of fracture of leg 1987    History of radiation therapy     LAST TREATMENT      Hypertension     Knee swelling     Limited joint range of motion (ROM)     RIGHT HIP    Low back strain     Periarthritis of shoulder     Rotator cuff syndrome 6/22    Skin sore     OPEN SORE LEFT BREAST    Syncope     Vertigo            Past Surgical History:   Procedure Laterality Date    AXILLARY LYMPH NODE BIOPSY/EXCISION Right     LYMPH NODE UNDER RIGHT ARM-MALIGNANT (DOUBLE MASTECTOMY)    BREAST BIOPSY Left     MALIGNANT    FRACTURE SURGERY  1987    Leg    HARDWARE REMOVAL      INCISION AND DRAINAGE LEG Left 06/30/2022    Procedure: INCISION AND DRAINAGE left ankle;  Surgeon: Kenneth Tran MD;  Location: [facility];  Service: Orthopedics;  Laterality: Left;    JOINT REPLACEMENT  mar 2020,july 2021    hips    MASTECTOMY W/ SENTINEL NODE BIOPSY Bilateral 09/16/2019    Procedure: BILATERLA MODIFIED RADICAL MASTECTOMY WITH BILATERAL SENTINEL LYMPH NODE BIOPSY;  Surgeon: Joby Barron Jr., MD;  Location: [facility];  Service: General    TOTAL HIP ARTHROPLASTY Right 03/02/2020    Procedure: RIGHT TOTAL HIP ARTHROPLASTY NATALY NAVIGATION;  Surgeon: Luis M Leonard MD;  Location: [facility];  Service: Orthopedics;  Laterality: Right;    TOTAL HIP ARTHROPLASTY Left 07/20/2021    Procedure: Posterior LEFT TOTAL HIP ARTHROPLASTY NATALY NAVIGATION;  Surgeon: Luis M Loenard MD;  Location: [facility];  Service: Orthopedics;  Laterality: Left;    VENOUS ACCESS  "DEVICE (PORT) INSERTION Right 02/01/2019    Procedure: INSERTION VENOUS ACCESS DEVICE;  Surgeon: Joby Braron Jr., MD;  Location: [facility] OR [facility];  Service: General    VENOUS ACCESS DEVICE (PORT) REMOVAL N/A 10/30/2019    Procedure: Mediport Removal;  Surgeon: Joby Barron Jr., MD;  Location: [facility] MAIN OR;  Service: General        Current Outpatient Medications on File Prior to Visit   Medication Sig Dispense Refill    acetaminophen (TYLENOL) 325 MG tablet Take 650 mg by mouth Every 6 (Six) Hours As Needed.      apixaban (ELIQUIS) 5 MG tablet tablet Take 1 tablet by mouth Every 12 (Twelve) Hours. Indications: Atrial Fibrillation 60 tablet     Cholecalciferol 250 MCG (15061 UT) tablet Take 1 tablet by mouth. Patient stated dose as \"1500 mg\"      digoxin (LANOXIN) 125 MCG tablet Take 1 tablet by mouth Daily.      exemestane (AROMASIN) 25 MG chemo tablet Take 1 tablet by mouth Daily. 30 tablet 3    famotidine (PEPCID) 20 MG tablet Take 1 tablet by mouth 2 (Two) Times a Day Before Meals.      metoprolol succinate XL (TOPROL-XL) 50 MG 24 hr tablet 150 mg.      tiZANidine (ZANAFLEX) 4 MG tablet Take 1 tablet by mouth Every 6 (Six) Hours As Needed for Muscle Spasms.      [DISCONTINUED] ibuprofen (ADVIL,MOTRIN) 200 MG tablet Take 1 tablet by mouth Daily. (Patient taking differently: Take 400 mg by mouth Daily.)      diphenhydrAMINE (BENADRYL) 50 MG capsule Take 50 mg by mouth As Needed.      metoprolol tartrate 75 MG tablet Take 75 mg by mouth Every 12 (Twelve) Hours. (Patient taking differently: Take 50 mg by mouth 3 (Three) Times a Day.)      polyethylene glycol (polyethylene glycol) 17 g packet Take 17 g by mouth 2 (Two) Times a Day.      simethicone (MYLICON) 80 MG chewable tablet Chew 1 tablet 4 (Four) Times a Day As Needed for Flatulence.      sodium chloride 0.65 % nasal spray   1 Spray, Nasal, Drops, Q4H, PRN Nasal Congestion, 0 Refill(s)      [DISCONTINUED] acetaminophen (TYLENOL) 325 MG tablet 650 mg.      " [DISCONTINUED] bisacodyl (DULCOLAX) 5 MG EC tablet Take 1 tablet by mouth Daily As Needed for Constipation (Use if polyethylene glycol is ineffective).      [DISCONTINUED] cefTRIAXone (ROCEPHIN) 2 g injection   IV Push, Inj, [id number], 0 Refill(s)      [DISCONTINUED] CEFTRIAXONE 2 G/20ML IV PUSH SYRINGE KIT (PAD) 2 g Infuse 2 g into a venous catheter Daily.      [DISCONTINUED] digoxin (LANOXIN) 125 MCG tablet 0.125 mg.      [DISCONTINUED] EPINEPHrine (EPIPEN) 0.3 MG/0.3ML solution auto-injector injection Inject 0.3 mg into the appropriate muscle as directed by prescriber As Needed.      [DISCONTINUED] HYDROcodone-acetaminophen (NORCO) 7.5-325 MG per tablet Take 1 tablet by mouth Every 8 (Eight) Hours As Needed for Moderate Pain . 40 tablet 0    [DISCONTINUED] ibuprofen (ADVIL,MOTRIN) 400 MG tablet 400 mg.      [DISCONTINUED] lidocaine (LIDODERM) 5 % Place 1 patch on the skin as directed by provider Daily. Remove & Discard patch within 12 hours or as directed by MD      [DISCONTINUED] sennosides-docusate (PERICOLACE) 8.6-50 MG per tablet Take 2 tablets by mouth Every Night.      [DISCONTINUED] traZODone (DESYREL) 50 MG tablet Take 1 tablet by mouth At Night As Needed for Sleep.       Current Facility-Administered Medications on File Prior to Visit   Medication Dose Route Frequency Provider Last Rate Last Admin    Chlorhexidine Gluconate Cloth 2 % pads 1 each  1 each Apply externally Take As Directed Luis M Leonard MD            ALLERGIES:    Allergies   Allergen Reactions    Benadryl [Diphenhydramine] Itching     INCREASES BLOOD PRESSURE    Erythromycin GI Intolerance    Levaquin [Levofloxacin] GI Intolerance    Penicillins GI Intolerance        Social History     Socioeconomic History    Marital status:      Spouse name: Cruz    Number of children: 0   Tobacco Use    Smoking status: Never Smoker    Smokeless tobacco: Never Used   Vaping Use    Vaping Use: Never used   Substance and Sexual Activity     "Alcohol use: Not Currently     Comment: 2 CUPS DAILY    Drug use: No    Sexual activity: Not Currently     Partners: Male     Birth control/protection: Abstinence        Family History   Problem Relation Age of Onset    Lung cancer Father     Irritable bowel syndrome Father     Cancer Mother     Breast cancer Mother     Liver disease Mother     Breast cancer Sister 48    Breast cancer Maternal Grandmother     Breast cancer Paternal Grandmother     Breast cancer Maternal Uncle     Malig Hyperthermia Neg Hx             Objective     Vitals:    08/16/22 1109   BP: 145/94   Pulse: 65   Resp: 18   Temp: 97.3 °F (36.3 °C)   TempSrc: Temporal   SpO2: 98%   Weight: 69.3 kg (152 lb 12.8 oz)   Height: 167.6 cm (65.98\")   PainSc:   6   PainLoc: Comment: Whole body pain     Current Status 8/16/2022   ECOG score 2     Exam:         GENERAL:  Well-developed, well-nourished  Patient  in no acute distress.   SKIN:  Warm, dry ,NO purpura ,no rash.  HEENT:  Pupils were equal and reactive to light and accomodation, conjunctivae noninjected, normal extraocular movements, normal visual acuity.   NECK:  Supple with good range of motion; no thyromegaly , no JVD or bruits,.No carotid artery pain, no carotid abnormal pulsation   LYMPHATICS:  No cervical, NO supraclavicular, NO axillary, NO inguinal adenopathies.  CARDIAC   normal rate , regular rhythm, without murmur,NO rubs NO S3 NO S4   LUNGS: normal breath sounds bilateral, no wheezing, NO rhonchi, NO crackles ,NO rubs.  VASCULAR VENOUS: no cyanosis, NO collateral circulation, NO varicosities, NO edema, NO palpable cords, NO pain,NO erythema, NO pigmentation of the skin.  ABDOMEN:  Soft, NO pain,no hepatomegaly, no splenomegaly,no masses, no ascites, no collateral circulation,no distention.  EXTREMITIES  AND SPINE:  No clubbing, no cyanosis ,OA LEFT HAND deformities ,  .No kyphosis,  no pain in spine, no pain in ribs .She still has an element of pain in the left shoulder, an element of " pain in the left hip upon rotation but there is no local inflammation on them. The only area of inflammation is the 2nd MP joint on the left hand that has local inflammation. There is minor increase in the temperature and the  is very poor in the left hand as stated. The right hand  is almost normal. The patient is using a cane. She had no tenderness in the cervical, thoracic or lumbar spine. No tenderness in the SI joints. No tenderness in the hip and no tenderness in the knee. Left ankle is still with minimal degree of swelling. No local inflammation.      NEUROLOGICAL:  Patient was awake, alert, oriented to time, person and place.          .            RECENT LABS:  Hematology WBC   Date Value Ref Range Status   08/16/2022 4.80 3.40 - 10.80 10*3/mm3 Final     RBC   Date Value Ref Range Status   08/16/2022 4.73 3.77 - 5.28 10*6/mm3 Final     Hemoglobin   Date Value Ref Range Status   08/16/2022 13.7 12.0 - 15.9 g/dL Final     Hematocrit   Date Value Ref Range Status   08/16/2022 45.1 34.0 - 46.6 % Final     Platelets   Date Value Ref Range Status   08/16/2022 210 140 - 450 10*3/mm3 Final          Body Fluid Culture - Body Fluid, Ankle, Left  Order: [id number]  Status: Final result    Visible to patient: Yes (not seen)    Next appt: 08/17/2022 at 01:20 PM in Orthopedic Surgery (Alejandro Meredith MD)    Specimen Information: Ankle, Left; Body Fluid        0 Result Notes    Body Fluid Culture Light growth (2+) Streptococcus dysgalactiae ssp equisimilis Abnormal     This organism is considered to be universally susceptible to penicillin.              Gram Stain  Many (4+) WBCs seen      No organisms seen              Resulting Agency: [facility]       Susceptibility     Streptococcus dysgalactiae ssp equisimilis     BRADY     Ceftriaxone <=0.12 ug/ml Susceptible     Clindamycin <=0.25 ug/ml Susceptible     Levofloxacin <=0.25 ug/ml Susceptible     Penicillin G <=0.06 ug/ml Susceptible     Vancomycin <=0.12 ug/ml  Susceptible               Linear View         Specimen Collected: 06/28/22 13:41 Last Resulted: 07/01/22 06:24                     Assessment & Plan    1.  History of least for the last 4 years, she has had an in crescendo mass in the left breast that has produced a gigantic tumor that WAS close to 25 cm in size and is replacing most of the anatomy of the left breast. There are areas of ulceration necrosis and bleeding and the mass is fixed to the chest wall. She has abundant amount of collateral circulation in the left anterior chest wall and it caught my attention the lack of any left axillary adenopathy. She has no lymphedema in the left upper extremity. In the right breast while in sitting position, no palpable abnormalities are documented. The right breast skin and nipple are normal. When the patient lies flat, there is a palpable mass at 9 o'clock from the nipple that measures probably 4 cm in size, very indistinct in regard to edges and margins. There was no mobility and no areas of tenderness. She has a giant adenopathy in the right axilla that measures close to 9 cm in size, uniform, no fluctuation, nontender, completely fixed to the axillary wall. There is no compromise of the skin.     After completion of 4 cycles of AC patient has had very dramatic improvement in all sites of disease including right axilla and left breast.    Completed 4 cycles Adriamycin Cytoxan on 4/12/2019.    Patient started weekly Taxol treatments on 5/3/2019.  Completed 12 weekly Taxol treatments on 7/19/2019.  9/16/2019 bilateral mastectomy by Dr. Joby Barron with axillary lymph node dissection.  No residual malignancy.  10/30/2019 patient's Mediport was removed in anticipation of radiation.  Radiation therapy under the care of Dr. Marmolejo 10/31/2019-1/13/2020 to the right chest wall.  Started on adjuvant Femara 2.5 mg daily 8/20/2019.  9/21/2020 continues on adjuvant Femara, tolerating it quite well.  Some mild hot flashes and  mild arthralgias, all which are tolerable.  I advised the patient that at this point her breast cancer remains in remission. She is 100% compliant of her medication letrozole and her clinical examination remains negative normal for breast cancer recurrence.   The patient was further reviewed on 04/16/2021. Her clinical examination is completely negative normal and her questioning is negative in regard to symptoms that would suggest breast cancer recurrence. She is 100% compliant with her Femara. Her white count, hemoglobin and platelets remain normal. Her tumor marker during the previous visit was normal and we have another one pending today CA 15-3.   The patient was further reviewed on 05/17/2021 along with her brother. The clinical examination has not changed. The only new complaint is the progressive amount of discomfort and the inability to function given the pain in the left hip area. Now she is limping. The only time when she is not in discomfort with the left hip is when she is sitting or lying in bed or riding the horse when gravity takes away the pressure of her left hip area. Radiologically speaking the CT scan of the chest, abdomen and pelvis to my eyes disclosed no abnormalities besides a very simple ovarian cyst that measures close to 7 x 5 cm with no trabeculation. There is no pelvic ascites. There is no pelvic adenopathy. The other abnormality obviously visible is the severe degree of scoliosis of the thoracic, lumbar spines.     It caught my attention the subchondral cyst in the left hip and the minimal if any space leftover between the head of the femur and the acetabulum. There is no metastasis in this anatomical site.     The radiologist mentioned cardiophrenic angle lymphadenopathy. I cannot see that from my naked eyes. I do not know what it is and I do not know how to express it. Pulmonary anatomy is normal. Hilar adenopathy is normal. No pericardial effusion. No pleural effusion. Minimal  infiltrate in the lungs related to radiation changes. Liver anatomy, pancreas and kidneys were unremarkable. The scoliosis is very obvious in the view of the abdomen.     Her CA 15-3 was minimal elevated in comparison to previous assessment and it raises the following question.   1. Is the cardiophrenic node described by the radiologist indeed significant of breast cancer recurrence or not? The only way to know will be to do a PET scan. This will be scheduled.   2. She is known for having an ovarian cyst for a long time but now is getting bigger, 7 cm across, and has no TABICATION with no pelvic ascites. I do not believe that this is representation of malignancy; nevertheless, I am going to run a CA-125 on her and I will proceed with a pelvic ultrasound as well as a GYN oncology referral for review.   3. I will send a notification to Dr. Leonard, the patient’s orthopedic surgeon, in regard to all her issues pertinent to the left hip. I think the patient is getting closer and closer to having a left hip replacement. 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 She recovered extremely well from her right hip surgery before.     The patient returned to the office on 05/24/2021. Since the previous visit the patient proceeded with a PET scan and I have reviewed this with her in the PAC system showing chest wall on the left side area of enlightening SUV activity that is almost 3 cm long x 7 mm wide. It is behind the bone. It is very close to the lower aspect of her heart. The reset of the PET scan discloses no activity. This is also specific in regard to the ovaries and actually an ultrasound of her vagina looking into the ovaries documented an unilocular cyst on the left side with typical features of benignity and a  was completely normal 5.5.     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 The rest of the PET scan is very much negative normal. I would not be surprised if this is an area of the internal mammary node chain.     Obviously the ovarian cyst is benign only locular with a normal  and I find no reason to refer her to GYN oncology anymore.     Therefore my advice to her is as follows:  1. She will remain on her letrozole 2.5 mg a day.  2. She will initiate Kisqali at the usual dose 3 weeks on 1 week off making her aware of the side effects of the medicine including leukopenia, nausea, vomiting, rash, diarrhea, abnormalities in the liver function test and neutropenic fever. She will take the medicine at least for the next 6 months.  3. The patient will proceed with referral to radiation oncology to treat this area completely.  4. I am going to go ahead and make a referral to Cardiology in preparation for this site of radiation therapy and knowing that tangential fields will be difficult to produce, I think it will be important to have her to be seen by Cardiology in preparation of Kisqali use. Also I advised her that I would like for her to take a magnesium supplement and she needs to eat plenty of nuts to minimize hypomagnesemia that can help her to prolong QT interval that is the most important side effect of Kisqali to my eyes. I advised her to continue her blood pressure medication and I advised her also to have an EKG today and also have a repeated EKG upon return in 3 weeks.    5. The patient will be having formal education and consent for Kisqali and she knows that this medicine will come to her from a speciality pharmacy.   6. The patient will require to have a repeat PET scan at least 6-8 weeks after completion of her radiation therapy to the chest wall.   7. I advised her and her brother present on the telephone  of all of these events and she was ready to proceed.     The patient was further reviewed on 07/07/2021 after she has continued her Kisqali and completion of the 1st round of the medicine. Today her EKG disclosed a normal QT interval and this has been sent to Cardiology to be reported officially. 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 She has completed her radiation therapy to the epicardial right lymph node with no difficulties or side effects.     The thing that is bothering her the most is the pain in the left hip associated with advanced degenerative arthritis and she is limping substantially and having discomfort requiring to take pain medicine, Voltaren, on a regular schedule. She already has been scheduled to have her hip replaced in a few days. In preparation for this I have advised the patient the following.   1. She will discontinue her aspirin and her nonsteroidal anti-inflammatory medications a week in advance for her surgery. This will minimize the potential for bleeding.   2. The patient will hold off on the Kisqali until I review her back in the office in 6 weeks. She will require a blood count done 3 days before the surgery at the same time that she will require her official COVID test before the surgical intervention.   3. The patient will remain on Femara for the time being at 2.5 mg a day. She has no need to stop this medication anytime besides the day of the surgery. She will resume this after the surgery and as soon as she is able to receive oral route.   4. Eventually the patient will require radiological assessment upon review in order to see what happened to the epicardial lymph node.           The patient was further reviewed on 09/30/2021. She has recovered further from the left hip surgery but she is not doing physical therapy anymore. She has a minimal limp and I advised her to go back on physical therapy on her own at home. She knows how to do the  exercises and she has the afternoon off every single day.    The patient completed her Kisqali a week ago. Her white count is low today. The hemoglobin is stable. The platelet count is stable. She has had issues with the consecution of the Kisqali but the pharmacists have been working on this process with  her specialty pharmacy.     Today it caught my attention the significant bump in her liver function test, SGOT, SGPT. The patient is not drinking any alcohol. She is not taking any cholesterol medicine. She is not taking any anti-inflammatory medication and leads me to think that Kisqali is the culprit of this. Given these findings we advised the patient to put the Kisqali on hold until we recheck chemistry profile on weekly basis for the next 3 or 4 weeks. We need to settle these numbers down before she continues the Kisqali. She probably will require dose adjustment at some point. Again, her hepatitis A, B and C serologies are negative.     Instructed pharmacist to discuss these issues with her as well.     She will be called at home with the report of her CA 15-3.     I encouraged her to remain on her Femara though.     We provided her blood pressure medication. She will remain on this for the time being.     1. In regard to her history of breast cancer she was reviewed on 11/19/2021. Since the previous visit the patient has had tumor marker CA 15-3 that has dropped to 20. Radiological assessment of her chest and abdomen CT scan that documents resolution of the epicardial lymphadenopathy in the right hemithorax, no pulmonary nodules or metastasis, no pleural effusion and no liver metastasis. No bone metastasis. Based on this information I do believe that the breast cancer is relatively quiet clinically, biochemically and radiologically and I advised her today to resume her Femara at the dose of 2.5 mg a day. The likelihood of Femara producing liver dysfunction is more than remote.   2. This patient has  developed very significant abnormalities in her liver function test with persistent elevation of the SGOT, SGPT and alkaline phosphatase and normal bilirubin. 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 I have tested a multitude of liver issues including hepatitis A, B and C serologies that were negative, antitrypsin 1 that was normal, antimicrosomal antibodies that was normal, and AARON. Anti-celiac sprue panel also was done, ferritin level, copper also were done looking for Stephen's disease and hemochromatosis. All of these conditions are basically ruled out. I even went ahead and scheduled her to have a liver biopsy that documents according to the pathologist inflammatory process in the liver consistent with drug effect.    We have discontinued the Kisqali 2 months ago thinking that that was the culprit but the numbers have not improved, then we discontinued most of the other supplemental medicines that she was taking, this led the numbers to improve and the only thing that she is left taking is Voltaren. She takes the Voltaren for her arthritis. I advised her on 11/19/2021 that she must discontinue the Voltaren and hopefully this will be making her numbers in regard to liver dysfunction to improve.     We are sure that this patient is not drinking alcohol at all.    We reviewed her medication list today and the only thing that she is taking is metoprolol and vitamin D. We asked her to remain on these medicines.     I even went ahead a couple of weeks ago to put her on a low dose prednisone to see if this had any benefit in her liver function test and it has not. I asked her today to discontinue this medicine as well.     I insisted today that the most likely reason why her liver function test is abnormal after all of the reviewing that we have done and also reviewing an alpha fetoprotein that was negative normal is drug effect. The only medicine that is leftover is antiinflammatory medication. Metoprolol  doing this is kind of unexpected and she does not take Tylenol in enough amount to trigger this abnormality neither. She raised the question if she could take a Tylenol here and there and I think for now it will be okay but she will need to deal with her arthritic symptoms in a topical way or physical therapy way, acupuncture or some other methodology.     I would like for her to come to the office every couple of weeks to do a CBC and a CMP and nurse visit. I will review her back in 6 weeks with a CBC, CMP and CA 15-3. I expect that if the Voltaren is the culprit her liver function test should be normal in 6 weeks.     I will communicate all of these issues to Dar Kirkpatrick MD, who has seen her before. I do not know if he will agree with my assessment but he is welcome to pitch in with any other objection or idea.      I discussed all of these facts with the patient and her brother in the room. I went piece by piece and item by item over all of these issues and significance of each one of them. They understood this clearly.   The patient was further reviewed on 12/29/2021. Recent review of her liver function tests a few days ago documented persistent elevation of her SGOT, SGPT, and alkaline phosphatase. She has discontinued most of the medicines and I have no other choice but discontinue the Femara. Since then and actually today is the 1st day in many weeks that the SGOT and SGPT and alkaline phosphatase are improving. The patient actually remains asymptomatic in this regard. Her liver is not enlarged. She has no jaundice. She has no rash, no skin lesions and no other new alterations. That is good news. Her white blood count, hemoglobin and platelets remain stable. 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 That is extremely rare. As I posted above, I discussed with  all my partners in regard to how many times through their careers prescribing Femara to multiple breast cancers they have seen Femara triggering hepatitis, none of them gave me a positive answer. I reviewed this information in [employer] and it is reported in less than 1% of patients taking this medication. I think we are in front of a rare situation but I want for her to withhold any further Femara treatment for the time being and I want to review her back in 3 weeks. If the liver function tests continue improving or normalize by then maybe we will have the answer once and for all. Raises the question what we will do next and I think the next medicine will be the utilization of Aromasin that has a different chemical component and different methodology for action. I made her aware of that. We are not going to go back in giving her Kisqali under the present circumstances given the confusion and all the issues pertinent to her liver dysfunction.     She already has been seen by Cardiology with no new issues or commentaries by Dr. Odell and her note has been reviewed today. Actually her blood pressure today looks terrific. Her pulse is normal. Her weight is stable. Her metoprolol is still ongoing in a minimal dose.     Therefore, she will return with a CBC, CMP stat in 3 weeks and further review at that point.  She was further reviewed on 01/25/2022. Her clinical examination is negative for metastasis, her liver is not enlarged and she has no jaundice. She discontinued her Femara close to 6 weeks ago and I am expecting to review her liver function test today. Also we requested tumor markers. They have been within normal limits.     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 She is advised to undergo therapy with Aromasin at the dose of 25 mg a day. She will require to be reassessed in 1 month.     We are not going to reestablish Kisqali therapy until we are sure that her liver is going to be  able to handle all of the medications at this point.    In regard to her blood pressure control we are going to send prescription to her pharmacy to have a year of refill on this medication. If the patient needs to go back on aspirin or not remains to be seen until we review her liver function test as well. She remains on her vitamin D supplement and she is taking minimal amount of Tylenol for pain.     I will review her back in a month with a CBC, CMP and a CA 15-3.     I discussed all of these facts with the patient and her brother present in the room.   The patient was further reviewed on 03/07/2022. Since the previous visit the patient has stopped the Femara in 12/2021 and today her liver function tests are completely back to normality. This proves the point that Femara was the culprit phenomenon that I never have seen and discussed with my partner, Danelle Cespedes MD. He never has seen this neither.     In any event the patient's liver function tests are back to normal today. The patient is tolerating her Aromasin extremely well with the exception of hot flashes but otherwise no other new issues. We have her CA 15-3 pending today. This will be discussed with her once that it becomes available. So far we have not documented any radiological or clinical progression of her breast cancer and she will remain again on the Aromasin for the time being.  I am not planning to add any Kisqali to her treatment at this point unless that the tumor marker has a dramatic increase.     In regard to her hypertension the patient will remain on her metoprolol that she has been taking for the time being. In regard to her previous history of stroke she will remain on a baby aspirin.     In regard to pain management for arthritis I asked her to remain on Tylenol to minimize the use of antiinflammatory medication that could have a lot of issues in regard to side effects. She will be allowed to use an antiinflammatory medication like  Aleve, ibuprofen, just very much on prn basis very sporadically.     I will review her back in 2 months with a CBC, CMP and a CA 15-3.    Otherwise I am not planning to have any other intervention or radiological analysis unless that again the tumor marker shows significant modification.     I discussed all of these facts with the patient and her daughter present in the room.      The patient returned on 05/09/2022 with no symptoms of anything in particular besides minimal respiratory allergies and pain in the left hip associated with her previous surgery. Her clinical examination today is very much unremarkable, she looks fantastic. She has no symptoms or signs of breast cancer recurrence. White count, hemoglobin and platelets are normal. Her tumor marker CA 15-3 has remained normal and actually lower than ever and compliance with Aromasin has been 100% with excellent tolerance.     Given the circumstances of this I advised her to remain on Aromasin 25 mg a day and I find no use for this patient to go back onto Kisqali or medicines of this nature.     From the point of view of her abnormal liver function abnormalities associated with Femara utilization her chemistry profile today is completely normal including SGOT, SGPT, bilirubin, alkaline phosphatase. On clinical grounds her liver is not enlarged. It is impressive to see how much hepatitis she got out of Femara and how quickly she got better after stopping the Femara. This is extremely unusual but it happened.    I notified the patient of this good finding and obviously she will never take Femara again.    I will review her back in 6-8 weeks to continue monitoring the clinical situation along with a CBC, CMP and CA 15-3.     Finally I encouraged her to remain on her aspirin and be sure that she takes her blood pressure medication. I provide these medicines for her.    Also Toprol will be continued and gabapentin for hot flashes or insomnia could be utilized and  could be utilized as well for pain.    I discussed all of these facts with the patient and her brother in the room.   On 08/16/2022 we reviewed the patient in regard to her history of breast cancer. Clinically she has not had any obvious recurrent disease in the chest wall in the lung anatomy or abdomen or pelvis. She remains on Aromasin adjuvantly and we are waiting to review tumor marker. She has a chest wall that is completely flat due to previous mastectomies and radiation therapy. There is no axillary adenopathies and the patient has in my opinion control of her disease process as far as we can tell. I advised her to remain on Aromasin 25 mg a day. I went ahead and scheduled a visit with us in a few weeks in order to further review radiological analysis that will be discussed below.    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 Her liver enzymes are completely normal today. She remains on Aromasin and obviously this patient never will be back on letrozole under any circumstances. This was documented through liver biopsy and through removal of the medication that triggered quick improvement in her liver function test.    3.The patient has developed an episode of septic arthritis in many joints including left shoulder and left ankle. She required antibiotic therapy as per recently. Infectious Disease was involved in this. In fact I discussed the case with them on the telephone. I suggested choosing the PICC line to be placed on the right arm in order to be able to deliver antibiotic therapy according to the proper indication. She completed the PICC line and it was removed last week with no complications or problems. The right upper extremity is completely normal. It is difficult for me to know why she developed the septic arthritis. She is in contact with horses all the time. The pathogen that she had in the joint is very uncommon in my opinion and the patient had no obvious source including no  sinuses, no dental source, no obvious cardiac source, no gastrointestinal or genitourinary source and no skin source. It raises the question if this pathogen could evade to colonic source and I think I feel the obligation for this patient to have at least a CT scan of the abdomen and pelvis and eventually in several months from now consider a colonoscopy. Another consideration could be a Cologuard in some way. At least the patient’s infection seems to be under control. Her joints are still sore today including left shoulder, left ankle. Local inflammatory signs are very much gone. The only area of inflammation that she has in the 1st MP joint on the left hand probably represents osteoarthritis, no more than that. She will remain in observation from this point of view.    4.The patient developed atrial fibrillation with rapid ventricular response during the hospitalization with septic arthritis. Echocardiogram documented very dilated left atrium. She is now receiving Eliquis, metoprolol, and digoxin. Her ventricular response is controlled today but she remains in AFib. She has requested a followup with Dr. Odell and I went ahead and made her an appointment. 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 If she had a fall and she is taking anticoagulants the only thing that we are going to have is just trouble. She has sold one of the horses. She will sell the other horse very soon and she will remain on land for the time being. Her  is back in town and he is helping her with consecution of groceries and things of this nature under the present circumstances. I advised her to minimize any trauma.    5.The patient has a grade 3 lymphedema in the left upper extremity so tight in the sleeve that she had them done before is not even fitting. She has no obvious infection as far as I can tell. I went and made an urgent referral to the  [facility] today to see if further bandage and drainage of this and massage would be helpful to her in the long run to control the problem. I still advised her to be extremely careful in regard to any injury or lesions in the skin of the left upper extremity to minimize any potentiality of cellulitis. In the long run if she has any episodes she will need to be back on antibiotics right away.    6.In regard to hypertension management I have controlled this before. Now she is taking quite amount of metoprolol. I will give up the management of this and now that she will see Dr. Odell, they will provide the care of this issue. I would not be surprised if the patient in the long run needs to be receiving another blood pressure medication given the fact that her blood pressure is still marginally elevated today. Taking digoxin, I do not think Lasix or hydrochlorothiazide will be a good choice because of the potential for hypokalemia unless the potassium is replaced and monitored very close. This will probably minimize the potentiality for digoxin toxicity.      7.The patient used to take copious amount of anti-inflammatory medication for arthritis, aches and pains. Given the fact that she has now been placed on anticoagulant, she is aware that anticoagulant and anti-inflammatory medicines are not good friends. The only medicine that she can take for pain is Tylenol. If the pain is bad enough she will require a prescription for Lortab that she is no longer taking.    8.On 08/16/2022 given the persistency of the pain in the left hip no matter what she does, I went ahead and scheduled a CT scan of the pelvis to be done before her next visit. Hopefully this will allow me to delineate the anatomy of this and I want to pray that this joint was not BACTERIAL SEEDING during the time of bacteremia and septic arthritis. So far on clinical grounds her white count is normal. We have requested a sedimentation rate to be done  today and this will need to be monitored. She states that this hip goes out of socket frequently and she has to end up in the emergency room.     I discussed all these facts with the patient’s brother who came all the way from [place]. I reviewed the records pertinent to this patient's care including all the records available in [facility] from the recent hospitalization at [facility] and I discussed the case with her infectious disease physician.

## 2022-08-17 ENCOUNTER — TELEPHONE (OUTPATIENT)
Dept: ORTHOPEDIC SURGERY | Facility: CLINIC | Age: 62
End: 2022-08-17

## 2022-08-17 ENCOUNTER — OFFICE VISIT (OUTPATIENT)
Dept: ORTHOPEDIC SURGERY | Facility: CLINIC | Age: 62
End: 2022-08-17

## 2022-08-17 VITALS — BODY MASS INDEX: 24.85 KG/M2 | WEIGHT: 154.6 LBS | TEMPERATURE: 96.9 F | HEIGHT: 66 IN

## 2022-08-17 DIAGNOSIS — M46.46 DISCITIS OF LUMBAR REGION: Primary | ICD-10-CM

## 2022-08-17 PROCEDURE — 72100 X-RAY EXAM L-S SPINE 2/3 VWS: CPT | Performed by: ORTHOPAEDIC SURGERY

## 2022-08-17 PROCEDURE — 99213 OFFICE O/P EST LOW 20 MIN: CPT | Performed by: ORTHOPAEDIC SURGERY

## 2022-08-17 NOTE — TELEPHONE ENCOUNTER
Will need to ask BRITW if OK to wait that long.  I am not sure what arrangements he made with patient

## 2022-08-17 NOTE — TELEPHONE ENCOUNTER
PATIENT WAS SEEN TODAY AND SANDRA SAID TO FOR HER TO COME BACK IN ON THE 7TH TO REVIEW SCANS. HE IS OUT THAT WEEK I SUGGESTED LEATHA ON THAT DATE BUT SHE WANTED TO SEE SANDRA HIS NEXT AVALIBLE IS 9/20/22 AT . DOES SHE NEED TO SEE LEATHA SOONER OR CAN SHE WAIT TO SEE SANDRA?

## 2022-08-17 NOTE — TELEPHONE ENCOUNTER
It is fine to wait until 9/20 but she had hoped to coordinate visits so that her brother could attend with her.  I guess you guys will need to work out the scheduling.

## 2022-08-17 NOTE — PROGRESS NOTES
She follows up today and presents with her brother accompanying her.  He is taking notes and has been helpful to her with communications.  She has been doing physical therapy she does have some persistent pain in the back which sometimes radiates to the top of the hips.  She clarifies this is pointing to the buttock area.  Good strength in the legs bilaterally.  The back is nontender to palpation the skin is completely intact no fluctuance redness induration.  She was treated for multiple foci of hematogenous infection and just finished 6 weeks of IV antibiotics under the direction of Dr. Tovar.  We saw her in the hospital with complaints of mild back pain and work-up demonstrated suspected L1-2 discitis.  Ultimately that improved immensely with bedrest and antibiotics but she was found to have infection in the ankle and the left hand as it turns out.  She just saw Dr. Adorno in follow-up for the ankle I&D.  Lumbar x-rays today show degenerative changes at multiple levels.  L1-2 appears less densely outlined than I would have anticipated but there are no comparison views.  Preop CT scans of the abdomen were reviewed under bone windows and she did have multilevel degenerative change in 1 to look to be the most sclerotic level.  MRI in the hospital suggested possibility of discitis at that level and indeed she may have a treated discitis.  In any event no comparison views are available for the lumbar films.    Overall doing better but we will have to see how she does now that she is off antibiotics.  I want her to go ahead and get a follow-up CT scan of the lumbar spine to look at this L1-2 disc space.  I will see her back in September.

## 2022-08-18 ENCOUNTER — OFFICE VISIT (OUTPATIENT)
Dept: CARDIOLOGY | Facility: CLINIC | Age: 62
End: 2022-08-18

## 2022-08-18 VITALS
BODY MASS INDEX: 24.91 KG/M2 | SYSTOLIC BLOOD PRESSURE: 124 MMHG | DIASTOLIC BLOOD PRESSURE: 80 MMHG | HEART RATE: 67 BPM | HEIGHT: 66 IN | WEIGHT: 155 LBS

## 2022-08-18 DIAGNOSIS — C50.812 MALIGNANT NEOPLASM OF OVERLAPPING SITES OF LEFT BREAST IN FEMALE, ESTROGEN RECEPTOR POSITIVE: ICD-10-CM

## 2022-08-18 DIAGNOSIS — I48.0 PAROXYSMAL ATRIAL FIBRILLATION: ICD-10-CM

## 2022-08-18 DIAGNOSIS — I10 PRIMARY HYPERTENSION: Primary | ICD-10-CM

## 2022-08-18 DIAGNOSIS — Z17.0 MALIGNANT NEOPLASM OF OVERLAPPING SITES OF LEFT BREAST IN FEMALE, ESTROGEN RECEPTOR POSITIVE: ICD-10-CM

## 2022-08-18 DIAGNOSIS — I48.19 ATRIAL FIBRILLATION, PERSISTENT: ICD-10-CM

## 2022-08-18 PROCEDURE — 99213 OFFICE O/P EST LOW 20 MIN: CPT | Performed by: INTERNAL MEDICINE

## 2022-08-18 PROCEDURE — 93000 ELECTROCARDIOGRAM COMPLETE: CPT | Performed by: INTERNAL MEDICINE

## 2022-08-18 NOTE — PROGRESS NOTES
Date of Office Visit: 2022  Encounter Provider: Danni Odell MD  Place of Service: [facility] CARDIOLOGY  Patient Name: Marylu Georges  :1960    Chief complaint  Cardio oncology care    History of Present Illness  Patient is a 61-year-old female with history of hypertension, prior stroke, left-sided breast cancer.  She had a large mass that was neglected for a period of time and became quite gigantic and ulcerated.  By 2019 she was diagnosed with breast cancer.  She had significant improvement with AC therapy in 2019 (total dose 243 mg/m² of Adriamycin) and this was followed by Taxol therapy which is completed on 2019. She subsequently underwent bilateral mastectomy 2019 with axillary node dissection.  She also underwent radiation therapy completed on 2020 and has been on adjuvant Femara since 2019.  She was able to achieve remission with this.  She then presented on 2021 showing some activity on a PET scan in the left side chest wall.  It was appearing to abut the lower aspect of her heart.  Kisquali was initiated.  She was started on magnesium with concerns of QTC prolongation that can be associated with this agent.  On 2021 tumor marker CA 15-3 had dropped resolution of the epicardial lymphadenopathy was noted by CT imaging.  Liver function tests were elevated and Kisquali  and then Femara were discontinued being seen by GI.    In 2019 she had an echocardiogram in the setting of syncope echocardiogram that showed an ejection fraction of 60% with moderately dilated left atrium and negative saline injection.  I do not see any strain measurements.  Stress perfusion study was negative for ischemia.  Monitor showed few episodes of atrial tachycardia that were quite brief.  There is no residual malignancy,  She then received radiation therapy to bilateral chest 10/19-.  With findings of mass near the pericardium follow-up echocardiogram was performed  on 6/2021 that showed an ejection fraction 57% with GLS -19.9% with normal wall thickness trivial valve regurgitation no pericardial mass was appreciated.      Since last visit she was admitted to hospital in June 2022 for infectious arthritis.  As well as discitis and myositis.  He was given 6 weeks of IV ceftriaxone.  While in hospital she also developed new onset atrial fibrillation with rapid rates.  Echo showed normal systolic function with a patent foramen ovale.  She was treated with metoprolol and Eliquis.  Since dismissal she has had no chest pain shortness of breath or dizziness.  She has developed lymphedema left arm as well as edema of the left foot.  She has had some palpitations.       Past Medical History:   Diagnosis Date   • Anemia in neoplastic disease    • Arthritis    • Arthritis of back    • Arthritis of neck    • Breast cancer (HCC)     Left   • CVA (cerebral vascular accident) (HCC)    • Fracture, fibula    • Fracture, finger    • Frozen shoulder    • Hip arthrosis 01/17   • Hip pain     RIGHT HIP... CYST   • History of fracture of leg 1987   • History of radiation therapy     LAST TREATMENT     • Hypertension    • Knee swelling    • Limited joint range of motion (ROM)     RIGHT HIP   • Low back strain    • Periarthritis of shoulder    • Rotator cuff syndrome 6/22   • Skin sore     OPEN SORE LEFT BREAST   • Syncope    • Vertigo      Past Surgical History:   Procedure Laterality Date   • AXILLARY LYMPH NODE BIOPSY/EXCISION Right     LYMPH NODE UNDER RIGHT ARM-MALIGNANT (DOUBLE MASTECTOMY)   • BREAST BIOPSY Left     MALIGNANT   • FRACTURE SURGERY  1987    Leg   • HARDWARE REMOVAL     • INCISION AND DRAINAGE LEG Left 06/30/2022    Procedure: INCISION AND DRAINAGE left ankle;  Surgeon: Kenneth Tran MD;  Location: [facility];  Service: Orthopedics;  Laterality: Left;   • JOINT REPLACEMENT  mar 2020,july 2021    hips   • MASTECTOMY W/ SENTINEL NODE BIOPSY Bilateral 09/16/2019     "Procedure: BILATERLA MODIFIED RADICAL MASTECTOMY WITH BILATERAL SENTINEL LYMPH NODE BIOPSY;  Surgeon: Joby Barron Jr., MD;  Location: [facility] OR;  Service: General   • TOTAL HIP ARTHROPLASTY Right 03/02/2020    Procedure: RIGHT TOTAL HIP ARTHROPLASTY NATALY NAVIGATION;  Surgeon: Luis M Leonard MD;  Location: [facility] OR;  Service: Orthopedics;  Laterality: Right;   • TOTAL HIP ARTHROPLASTY Left 07/20/2021    Procedure: Posterior LEFT TOTAL HIP ARTHROPLASTY NATALY NAVIGATION;  Surgeon: Luis M Leonard MD;  Location: [facility] MAIN OR;  Service: Orthopedics;  Laterality: Left;   • VENOUS ACCESS DEVICE (PORT) INSERTION Right 02/01/2019    Procedure: INSERTION VENOUS ACCESS DEVICE;  Surgeon: Joby Barron Jr., MD;  Location: [facility] OSC;  Service: General   • VENOUS ACCESS DEVICE (PORT) REMOVAL N/A 10/30/2019    Procedure: Mediport Removal;  Surgeon: Joby Barron Jr., MD;  Location: [facility] OR;  Service: General     Outpatient Medications Prior to Visit   Medication Sig Dispense Refill   • acetaminophen (TYLENOL) 325 MG tablet Take 650 mg by mouth Every 6 (Six) Hours As Needed.     • apixaban (ELIQUIS) 5 MG tablet tablet Take 1 tablet by mouth Every 12 (Twelve) Hours. Indications: Atrial Fibrillation 60 tablet    • Cholecalciferol 250 MCG (91916 UT) tablet Take 1 tablet by mouth. Patient stated dose as \"1500 mg\"     • digoxin (LANOXIN) 125 MCG tablet Take 1 tablet by mouth Daily.     • diphenhydrAMINE (BENADRYL) 50 MG capsule Take 50 mg by mouth As Needed.     • exemestane (AROMASIN) 25 MG chemo tablet Take 1 tablet by mouth Daily. 30 tablet 3   • famotidine (PEPCID) 20 MG tablet Take 1 tablet by mouth 2 (Two) Times a Day Before Meals.     • metoprolol succinate XL (TOPROL-XL) 50 MG 24 hr tablet 150 mg.     • metoprolol tartrate 75 MG tablet Take 75 mg by mouth Every 12 (Twelve) Hours. (Patient taking differently: Take 50 mg by mouth 3 (Three) Times a Day.)     • polyethylene glycol (polyethylene " glycol) 17 g packet Take 17 g by mouth 2 (Two) Times a Day.     • simethicone (MYLICON) 80 MG chewable tablet Chew 1 tablet 4 (Four) Times a Day As Needed for Flatulence.     • sodium chloride 0.65 % nasal spray   1 Spray, Nasal, Drops, Q4H, PRN Nasal Congestion, 0 Refill(s)     • tiZANidine (ZANAFLEX) 4 MG tablet Take 1 tablet by mouth Every 6 (Six) Hours As Needed for Muscle Spasms.       Facility-Administered Medications Prior to Visit   Medication Dose Route Frequency Provider Last Rate Last Admin   • Chlorhexidine Gluconate Cloth 2 % pads 1 each  1 each Apply externally Take As Directed Luis M Leonard MD           Allergies as of 08/18/2022 - Reviewed 08/18/2022   Allergen Reaction Noted   • Benadryl [diphenhydramine] Itching 02/18/2020   • Erythromycin GI Intolerance 01/17/2019   • Levaquin [levofloxacin] GI Intolerance 03/07/2019   • Penicillins GI Intolerance 01/17/2019     Social History     Socioeconomic History   • Marital status:      Spouse name: Cruz   • Number of children: 0   Tobacco Use   • Smoking status: Never Smoker   • Smokeless tobacco: Never Used   Vaping Use   • Vaping Use: Never used   Substance and Sexual Activity   • Alcohol use: Not Currently     Comment: 2 CUPS DAILY   • Drug use: No   • Sexual activity: Not Currently     Partners: Male     Birth control/protection: Abstinence     Family History   Problem Relation Age of Onset   • Lung cancer Father    • Irritable bowel syndrome Father    • Cancer Mother    • Breast cancer Mother    • Liver disease Mother    • Breast cancer Sister 48   • Breast cancer Maternal Grandmother    • Breast cancer Paternal Grandmother    • Breast cancer Maternal Uncle    • Malig Hyperthermia Neg Hx      Review of Systems   Constitutional: Positive for malaise/fatigue. Negative for chills, fever, weight gain and weight loss.   Cardiovascular: Positive for leg swelling and palpitations.   Respiratory: Negative for cough, snoring and wheezing.   "  Hematologic/Lymphatic: Negative for bleeding problem. Does not bruise/bleed easily.   Skin: Negative for color change.   Musculoskeletal: Positive for joint pain. Negative for falls and myalgias.   Gastrointestinal: Negative for melena.   Genitourinary: Negative for hematuria.   Neurological: Negative for excessive daytime sleepiness.   Psychiatric/Behavioral: Negative for depression. The patient is not nervous/anxious.         Objective:     Vitals:    08/18/22 1326   BP: 124/80   Pulse: 67   Weight: 70.3 kg (155 lb)   Height: 167.6 cm (66\")     Body mass index is 25.02 kg/m².    Vitals reviewed.   Constitutional:       Appearance: Well-developed.   Eyes:      General: No scleral icterus.        Right eye: No discharge.      Conjunctiva/sclera: Conjunctivae normal.      Pupils: Pupils are equal, round, and reactive to light.   HENT:      Head: Normocephalic.      Nose: Nose normal.   Neck:      Thyroid: No thyromegaly.      Vascular: No JVD.   Pulmonary:      Effort: Pulmonary effort is normal. No respiratory distress.      Breath sounds: Normal breath sounds. No wheezing. No rales.   Cardiovascular:      Normal rate. Irregularly irregular rhythm. Normal S1. Normal S2.      Murmurs: There is no murmur.      No gallop.   Pulses:     Intact distal pulses.   Edema:     Peripheral edema absent.   Abdominal:      General: Bowel sounds are normal. There is no distension.      Palpations: Abdomen is soft.      Tenderness: There is no abdominal tenderness. There is no rebound.   Musculoskeletal: Normal range of motion.         General: No tenderness.      Cervical back: Normal range of motion and neck supple. Skin:     General: Skin is warm and dry.      Findings: No erythema or rash.   Neurological:      Mental Status: Alert and oriented to person, place, and time.   Psychiatric:         Behavior: Behavior normal.         Thought Content: Thought content normal.         Judgment: Judgment normal.       Lab Review: "     ECG 12 Lead    Date/Time: 8/18/2022 1:27 PM  Performed by: Danni Odell MD  Authorized by: Danni Odell MD   Comparison: compared with previous ECG   Comparison to previous ECG: HR has decreased  Rhythm: atrial fibrillation  Other findings: non-specific ST-T wave changes    Clinical impression: abnormal EKG          Assessment:       Diagnosis Plan   1. Primary hypertension  ECG 12 Lead   2. Atrial fibrillation, persistent (HCC)  Ambulatory Referral to Cardiac Electrophysiology   3. Paroxysmal atrial fibrillation (HCC)     4. Malignant neoplasm of overlapping sites of left breast in female, estrogen receptor positive (HCC)       Plan:       1.  Atrial fibrillation.  Rate is controlled and she is on Eliquis.  1.  Metastatic breast cancer.  Felt to be stable.  Followed now off chemotherapy by Dr. Dixon.  On Femara at this time.  2.  Abnormal EKG. No ischemia on prior stress test 8/2019 and no current anginal symptoms.  Ejection fraction normal on echo 6/27/2022.  3.  Pericardial mass.  Resolved on chemotherapy.  And being followed by serial imaging by Dr. Dixon.  4.  Hypertension well controlled  5.  Elevated liver function tests.  She had a biopsy and is being followed by Dr. Kirkpatrick.   6.  Arthritis, septic.  Just completed IV ceftriaxone for 6 weeks.      Time Spent: I spent 25 minutes caring for Marylu on this date of service. This time includes time spent by me in the following activities: preparing for the visit, reviewing tests, obtaining and/or reviewing a separately obtained history, performing a medically appropriate examination and/or evaluation, counseling and educating the patient/family/caregiver, documenting information in the medical record and independently interpreting results and communicating that information with the patient/family/caregiver.   I spent 1 minutes on the separately reported service of ECG. This time is not included in the time used to support the E/M service also reported  "today.        Your medication list          Accurate as of August 18, 2022 11:59 PM. If you have any questions, ask your nurse or doctor.            CHANGE how you take these medications      Instructions Last Dose Given Next Dose Due   Metoprolol Tartrate 75 MG tablet  What changed:   · how much to take  · when to take this      Take 75 mg by mouth Every 12 (Twelve) Hours.          CONTINUE taking these medications      Instructions Last Dose Given Next Dose Due   acetaminophen 325 MG tablet  Commonly known as: TYLENOL      Take 650 mg by mouth Every 6 (Six) Hours As Needed.       apixaban 5 MG tablet tablet  Commonly known as: ELIQUIS      Take 1 tablet by mouth Every 12 (Twelve) Hours. Indications: Atrial Fibrillation       Cholecalciferol 250 MCG (16775 UT) tablet      Take 1 tablet by mouth. Patient stated dose as \"1500 mg\"       digoxin 125 MCG tablet  Commonly known as: LANOXIN      Take 1 tablet by mouth Daily.       diphenhydrAMINE 50 MG capsule  Commonly known as: BENADRYL      Take 50 mg by mouth As Needed.       exemestane 25 MG chemo tablet  Commonly known as: AROMASIN      Take 1 tablet by mouth Daily.       famotidine 20 MG tablet  Commonly known as: PEPCID      Take 1 tablet by mouth 2 (Two) Times a Day Before Meals.       metoprolol succinate XL 50 MG 24 hr tablet  Commonly known as: TOPROL-XL      150 mg.       polyethylene glycol 17 g packet  Commonly known as: MIRALAX      Take 17 g by mouth 2 (Two) Times a Day.       simethicone 80 MG chewable tablet  Commonly known as: MYLICON      Chew 1 tablet 4 (Four) Times a Day As Needed for Flatulence.       sodium chloride 0.65 % nasal spray      1 Spray, Nasal, Drops, Q4H, PRN Nasal Congestion, 0 Refill(s)       tiZANidine 4 MG tablet  Commonly known as: ZANAFLEX      Take 1 tablet by mouth Every 6 (Six) Hours As Needed for Muscle Spasms.              Patient is no longer taking -.  I corrected the med list to reflect this.  I did not stop these " medications.      Dictated utilizing Dragon dictation

## 2022-09-07 ENCOUNTER — HOSPITAL ENCOUNTER (OUTPATIENT)
Dept: CT IMAGING | Facility: HOSPITAL | Age: 62
Discharge: HOME | End: 2022-09-07
Admitting: INTERNAL MEDICINE

## 2022-09-07 DIAGNOSIS — Z17.0 MALIGNANT NEOPLASM OF OVERLAPPING SITES OF LEFT BREAST IN FEMALE, ESTROGEN RECEPTOR POSITIVE: ICD-10-CM

## 2022-09-07 DIAGNOSIS — C50.812 MALIGNANT NEOPLASM OF OVERLAPPING SITES OF BOTH BREASTS IN FEMALE, ESTROGEN RECEPTOR POSITIVE: ICD-10-CM

## 2022-09-07 DIAGNOSIS — T50.905A DRUG-INDUCED HEPATITIS: ICD-10-CM

## 2022-09-07 DIAGNOSIS — Z17.0 MALIGNANT NEOPLASM OF OVERLAPPING SITES OF BOTH BREASTS IN FEMALE, ESTROGEN RECEPTOR POSITIVE: ICD-10-CM

## 2022-09-07 DIAGNOSIS — C50.811 MALIGNANT NEOPLASM OF OVERLAPPING SITES OF BOTH BREASTS IN FEMALE, ESTROGEN RECEPTOR POSITIVE: ICD-10-CM

## 2022-09-07 DIAGNOSIS — K71.6 DRUG-INDUCED HEPATITIS: ICD-10-CM

## 2022-09-07 DIAGNOSIS — C50.812 MALIGNANT NEOPLASM OF OVERLAPPING SITES OF LEFT BREAST IN FEMALE, ESTROGEN RECEPTOR POSITIVE: ICD-10-CM

## 2022-09-07 PROCEDURE — 74177 CT ABD & PELVIS W/CONTRAST: CPT

## 2022-09-07 PROCEDURE — 0 DIATRIZOATE MEGLUMINE & SODIUM PER 1 ML: Performed by: INTERNAL MEDICINE

## 2022-09-07 PROCEDURE — 25010000002 IOPAMIDOL 61 % SOLUTION: Performed by: INTERNAL MEDICINE

## 2022-09-07 PROCEDURE — 71260 CT THORAX DX C+: CPT

## 2022-09-07 RX ADMIN — DIATRIZOATE MEGLUMINE AND DIATRIZOATE SODIUM 30 ML: 660; 100 LIQUID ORAL; RECTAL at 10:30

## 2022-09-07 RX ADMIN — IOPAMIDOL 90 ML: 612 INJECTION, SOLUTION INTRAVENOUS at 11:27

## 2022-09-14 ENCOUNTER — LAB (OUTPATIENT)
Dept: LAB | Facility: HOSPITAL | Age: 62
End: 2022-09-14

## 2022-09-14 ENCOUNTER — TELEMEDICINE (OUTPATIENT)
Dept: ONCOLOGY | Facility: CLINIC | Age: 62
End: 2022-09-14

## 2022-09-14 VITALS
HEIGHT: 66 IN | HEART RATE: 50 BPM | RESPIRATION RATE: 16 BRPM | DIASTOLIC BLOOD PRESSURE: 78 MMHG | WEIGHT: 154.1 LBS | OXYGEN SATURATION: 100 % | SYSTOLIC BLOOD PRESSURE: 130 MMHG | TEMPERATURE: 98.2 F | BODY MASS INDEX: 24.77 KG/M2

## 2022-09-14 DIAGNOSIS — C50.811 MALIGNANT NEOPLASM OF OVERLAPPING SITES OF BOTH BREASTS IN FEMALE, ESTROGEN RECEPTOR POSITIVE: ICD-10-CM

## 2022-09-14 DIAGNOSIS — Z17.0 MALIGNANT NEOPLASM OF OVERLAPPING SITES OF BOTH BREASTS IN FEMALE, ESTROGEN RECEPTOR POSITIVE: ICD-10-CM

## 2022-09-14 DIAGNOSIS — I10 PRIMARY HYPERTENSION: ICD-10-CM

## 2022-09-14 DIAGNOSIS — C50.812 MALIGNANT NEOPLASM OF OVERLAPPING SITES OF LEFT BREAST IN FEMALE, ESTROGEN RECEPTOR POSITIVE: ICD-10-CM

## 2022-09-14 DIAGNOSIS — Z17.0 MALIGNANT NEOPLASM OF OVERLAPPING SITES OF LEFT BREAST IN FEMALE, ESTROGEN RECEPTOR POSITIVE: ICD-10-CM

## 2022-09-14 DIAGNOSIS — Z17.0 MALIGNANT NEOPLASM OF OVERLAPPING SITES OF LEFT BREAST IN FEMALE, ESTROGEN RECEPTOR POSITIVE: Primary | ICD-10-CM

## 2022-09-14 DIAGNOSIS — K71.6 DRUG-INDUCED HEPATITIS: ICD-10-CM

## 2022-09-14 DIAGNOSIS — M00.272 STREPTOCOCCAL ARTHRITIS OF LEFT ANKLE: ICD-10-CM

## 2022-09-14 DIAGNOSIS — T50.905A DRUG-INDUCED HEPATITIS: ICD-10-CM

## 2022-09-14 DIAGNOSIS — G47.01 INSOMNIA DUE TO MEDICAL CONDITION: ICD-10-CM

## 2022-09-14 DIAGNOSIS — C50.812 MALIGNANT NEOPLASM OF OVERLAPPING SITES OF BOTH BREASTS IN FEMALE, ESTROGEN RECEPTOR POSITIVE: ICD-10-CM

## 2022-09-14 DIAGNOSIS — C50.812 MALIGNANT NEOPLASM OF OVERLAPPING SITES OF LEFT BREAST IN FEMALE, ESTROGEN RECEPTOR POSITIVE: Primary | ICD-10-CM

## 2022-09-14 LAB
ALBUMIN SERPL-MCNC: 4.8 G/DL (ref 3.5–5.2)
ALBUMIN/GLOB SERPL: 2.2 G/DL
ALP SERPL-CCNC: 99 U/L (ref 39–117)
ALT SERPL W P-5'-P-CCNC: 13 U/L (ref 1–33)
ANION GAP SERPL CALCULATED.3IONS-SCNC: 11 MMOL/L (ref 5–15)
AST SERPL-CCNC: 18 U/L (ref 1–32)
BASOPHILS # BLD AUTO: 0.04 10*3/MM3 (ref 0–0.2)
BASOPHILS NFR BLD AUTO: 0.8 % (ref 0–1.5)
BILIRUB SERPL-MCNC: 0.4 MG/DL (ref 0–1.2)
BUN SERPL-MCNC: 19 MG/DL (ref 8–23)
BUN/CREAT SERPL: 19 (ref 7–25)
CALCIUM SPEC-SCNC: 10 MG/DL (ref 8.6–10.5)
CHLORIDE SERPL-SCNC: 102 MMOL/L (ref 98–107)
CO2 SERPL-SCNC: 27 MMOL/L (ref 22–29)
CREAT SERPL-MCNC: 1 MG/DL (ref 0.57–1)
DEPRECATED RDW RBC AUTO: 50.6 FL (ref 37–54)
EGFRCR SERPLBLD CKD-EPI 2021: 63 ML/MIN/1.73
EOSINOPHIL # BLD AUTO: 0.1 10*3/MM3 (ref 0–0.4)
EOSINOPHIL NFR BLD AUTO: 2 % (ref 0.3–6.2)
ERYTHROCYTE [DISTWIDTH] IN BLOOD BY AUTOMATED COUNT: 14.6 % (ref 12.3–15.4)
ERYTHROCYTE [SEDIMENTATION RATE] IN BLOOD: 3 MM/HR (ref 0–30)
GLOBULIN UR ELPH-MCNC: 2.2 GM/DL
GLUCOSE SERPL-MCNC: 117 MG/DL (ref 65–99)
HCT VFR BLD AUTO: 44.1 % (ref 34–46.6)
HGB BLD-MCNC: 13.9 G/DL (ref 12–15.9)
IMM GRANULOCYTES # BLD AUTO: 0.01 10*3/MM3 (ref 0–0.05)
IMM GRANULOCYTES NFR BLD AUTO: 0.2 % (ref 0–0.5)
LYMPHOCYTES # BLD AUTO: 1.35 10*3/MM3 (ref 0.7–3.1)
LYMPHOCYTES NFR BLD AUTO: 27.7 % (ref 19.6–45.3)
MCH RBC QN AUTO: 29.3 PG (ref 26.6–33)
MCHC RBC AUTO-ENTMCNC: 31.5 G/DL (ref 31.5–35.7)
MCV RBC AUTO: 93 FL (ref 79–97)
MONOCYTES # BLD AUTO: 0.37 10*3/MM3 (ref 0.1–0.9)
MONOCYTES NFR BLD AUTO: 7.6 % (ref 5–12)
NEUTROPHILS NFR BLD AUTO: 3.01 10*3/MM3 (ref 1.7–7)
NEUTROPHILS NFR BLD AUTO: 61.7 % (ref 42.7–76)
NRBC BLD AUTO-RTO: 0 /100 WBC (ref 0–0.2)
PLATELET # BLD AUTO: 189 10*3/MM3 (ref 140–450)
PMV BLD AUTO: 8.3 FL (ref 6–12)
POTASSIUM SERPL-SCNC: 4.1 MMOL/L (ref 3.5–5.2)
PROT SERPL-MCNC: 7 G/DL (ref 6–8.5)
RBC # BLD AUTO: 4.74 10*6/MM3 (ref 3.77–5.28)
SODIUM SERPL-SCNC: 140 MMOL/L (ref 136–145)
WBC NRBC COR # BLD: 4.88 10*3/MM3 (ref 3.4–10.8)

## 2022-09-14 PROCEDURE — 80053 COMPREHEN METABOLIC PANEL: CPT | Performed by: INTERNAL MEDICINE

## 2022-09-14 PROCEDURE — 99214 OFFICE O/P EST MOD 30 MIN: CPT | Performed by: INTERNAL MEDICINE

## 2022-09-14 PROCEDURE — 85652 RBC SED RATE AUTOMATED: CPT | Performed by: INTERNAL MEDICINE

## 2022-09-14 PROCEDURE — 36415 COLL VENOUS BLD VENIPUNCTURE: CPT

## 2022-09-14 PROCEDURE — 85025 COMPLETE CBC W/AUTO DIFF WBC: CPT

## 2022-09-14 RX ORDER — ZOLPIDEM TARTRATE 5 MG/1
5 TABLET ORAL NIGHTLY PRN
Qty: 30 TABLET | Refills: 1 | Status: SHIPPED | OUTPATIENT
Start: 2022-09-14 | End: 2022-10-25

## 2022-09-14 NOTE — PROGRESS NOTES
Subjective     REASON FOR FOLLOW UP:  1. 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 R BREAST MASS WITH GIANT R AXILLARY ADENOPATHY.  SHE UNDERWENT DOSE DENSE AC, TAXOL, BILATERAL MASTECTOMIES, DRAMATIC NEAR COMPLETE GA, RADIATION THERAPY AND ADJUVANT FEMARA STOPPED ON 12/21 BECAUSE DRUG INDUCED HEPATITIS, SWITCHED TO AROMASIN 2/22: NO SIDE EFFECTS.    3. FAMILY HISTORY OF BREAST CANCER IN MOTHER AND SISTER, SISTER WAS TESTED FOR BRCA AND WAS NEGATIVE.    4. LEFT OVARIAN CYST , IT HAS BEEN PRESENT FOR LONG TIME BUT IS GETTING LARGER, ASYMPTOMATIC, NEED FOR , PELVIC US AND GYN ONC EVal: no need for any intervention                  5. LEFT HIP PAIN SEVERE ARTHRITIS, REAL NEED FOR LEFT HIP REPLACEMENT: PERFORMED 8/21    6. 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      On 09/14/2022 this 62-year-old female returns today to the office in company of her brother. This visit has gone through telemedicine by [email or web address]. In any event the patient is doing well at this time still having aches and pains but the most important issue is having significant insomnia. No matter what she tries to sleep and she wakes up intermittently. She has not been able to have a good night sleep in the last month or so. She is anxious as usual. She has a type A personality. In the interim she has had radiological assessment for review today. She has undergone neoadjuvant therapy, subsequently resection, radiation therapy but also she has had drug induced hepatitis with Femara that was discontinued and switched to Aromasin. Physically the patient at this time besides the insomnia still has some pain in the left hip from time to time but it has not become dislocated in the last several weeks. Her appetite is excellent back to normality, bowel activity and urination are normal, no further diarrhea, no  respiratory problems. Se has been seen by Danni Odell MD, in regard her A-fib. She will be seeing electrophysiologist tomorrow in regard to assessment of consideration of cardioversion in the future or some other measures. She remains on anticoagulant with no clinical bleeding. No embolic phenomenon. Her blood pressure is under good control. Her weight is stable. The patient has not had any new rashes in the skin or inflammatory arthritis. Remind ourselves that she has septic arthritis in many sites described in the previous visit a few weeks ago.                 Past Medical History:   Diagnosis Date    Anemia in neoplastic disease     Arthritis     Arthritis of back     Arthritis of neck     Breast cancer (HCC)     Left    CVA (cerebral vascular accident) (HCC)     Fracture, fibula     Fracture, finger     Frozen shoulder     Hip arthrosis 01/17    Hip pain     RIGHT HIP... CYST    History of fracture of leg 1987    History of radiation therapy     LAST TREATMENT      Hypertension     Knee swelling     Limited joint range of motion (ROM)     RIGHT HIP    Low back strain     Periarthritis of shoulder     Rotator cuff syndrome 6/22    Skin sore     OPEN SORE LEFT BREAST    Syncope     Vertigo            Past Surgical History:   Procedure Laterality Date    AXILLARY LYMPH NODE BIOPSY/EXCISION Right     LYMPH NODE UNDER RIGHT ARM-MALIGNANT (DOUBLE MASTECTOMY)    BREAST BIOPSY Left     MALIGNANT    FRACTURE SURGERY  1987    Leg    HARDWARE REMOVAL      INCISION AND DRAINAGE LEG Left 06/30/2022    Procedure: INCISION AND DRAINAGE left ankle;  Surgeon: Kenneth Tran MD;  Location: [facility];  Service: Orthopedics;  Laterality: Left;    JOINT REPLACEMENT  mar 2020,july 2021    hips    MASTECTOMY W/ SENTINEL NODE BIOPSY Bilateral 09/16/2019    Procedure: BILATERLA MODIFIED RADICAL MASTECTOMY WITH BILATERAL SENTINEL LYMPH NODE BIOPSY;  Surgeon: Joby Barron Jr., MD;  Location: [facility];  Service:  "General    TOTAL HIP ARTHROPLASTY Right 03/02/2020    Procedure: RIGHT TOTAL HIP ARTHROPLASTY NATALY NAVIGATION;  Surgeon: Luis M Leonard MD;  Location: [facility] MAIN OR;  Service: Orthopedics;  Laterality: Right;    TOTAL HIP ARTHROPLASTY Left 07/20/2021    Procedure: Posterior LEFT TOTAL HIP ARTHROPLASTY NATALY NAVIGATION;  Surgeon: Luis M Leonard MD;  Location: [facility] MAIN OR;  Service: Orthopedics;  Laterality: Left;    VENOUS ACCESS DEVICE (PORT) INSERTION Right 02/01/2019    Procedure: INSERTION VENOUS ACCESS DEVICE;  Surgeon: Joby Barron Jr., MD;  Location: [facility] OR [facility];  Service: General    VENOUS ACCESS DEVICE (PORT) REMOVAL N/A 10/30/2019    Procedure: Mediport Removal;  Surgeon: Joby Barron Jr., MD;  Location: [facility] OR;  Service: General        Current Outpatient Medications on File Prior to Visit   Medication Sig Dispense Refill    acetaminophen (TYLENOL) 325 MG tablet Take 650 mg by mouth Every 6 (Six) Hours As Needed.      apixaban (ELIQUIS) 5 MG tablet tablet Take 1 tablet by mouth Every 12 (Twelve) Hours. Indications: Atrial Fibrillation 60 tablet     Cholecalciferol 250 MCG (16686 UT) tablet Take 1 tablet by mouth. Patient stated dose as \"1500 mg\"      digoxin (LANOXIN) 125 MCG tablet Take 1 tablet by mouth Daily.      diphenhydrAMINE (BENADRYL) 50 MG capsule Take 50 mg by mouth As Needed.      exemestane (AROMASIN) 25 MG chemo tablet Take 1 tablet by mouth Daily. 30 tablet 3    famotidine (PEPCID) 20 MG tablet Take 1 tablet by mouth 2 (Two) Times a Day Before Meals.      metoprolol succinate XL (TOPROL-XL) 50 MG 24 hr tablet 150 mg.      metoprolol tartrate 75 MG tablet Take 75 mg by mouth Every 12 (Twelve) Hours. (Patient taking differently: Take 50 mg by mouth 3 (Three) Times a Day.)      polyethylene glycol (polyethylene glycol) 17 g packet Take 17 g by mouth 2 (Two) Times a Day.      simethicone (MYLICON) 80 MG chewable tablet Chew 1 tablet 4 (Four) Times a Day As Needed for " "Flatulence.      sodium chloride 0.65 % nasal spray   1 Spray, Nasal, Drops, Q4H, PRN Nasal Congestion, 0 Refill(s)      tiZANidine (ZANAFLEX) 4 MG tablet Take 1 tablet by mouth Every 6 (Six) Hours As Needed for Muscle Spasms.       Current Facility-Administered Medications on File Prior to Visit   Medication Dose Route Frequency Provider Last Rate Last Admin    Chlorhexidine Gluconate Cloth 2 % pads 1 each  1 each Apply externally Take As Directed Luis M Leonard MD            ALLERGIES:    Allergies   Allergen Reactions    Benadryl [Diphenhydramine] Itching     INCREASES BLOOD PRESSURE    Erythromycin GI Intolerance    Levaquin [Levofloxacin] GI Intolerance    Penicillins GI Intolerance        Social History     Socioeconomic History    Marital status:      Spouse name: Cruz    Number of children: 0   Tobacco Use    Smoking status: Never Smoker    Smokeless tobacco: Never Used   Vaping Use    Vaping Use: Never used   Substance and Sexual Activity    Alcohol use: Not Currently     Comment: 2 CUPS DAILY    Drug use: No    Sexual activity: Not Currently     Partners: Male     Birth control/protection: Abstinence        Family History   Problem Relation Age of Onset    Lung cancer Father     Irritable bowel syndrome Father     Cancer Mother     Breast cancer Mother     Liver disease Mother     Breast cancer Sister 48    Breast cancer Maternal Grandmother     Breast cancer Paternal Grandmother     Breast cancer Maternal Uncle     Malig Hyperthermia Neg Hx             Objective     Vitals:    09/14/22 1617   Resp: 16   Weight: 69.9 kg (154 lb 1.6 oz)   Height: 167.6 cm (65.98\")   PainSc: 6  Comment: Joint pain     Current Status 8/16/2022   ECOG score 2     Exam: she looks normal on the video              .            RECENT LABS:  Hematology WBC   Date Value Ref Range Status   09/14/2022 4.88 3.40 - 10.80 10*3/mm3 Final     RBC   Date Value Ref Range Status   09/14/2022 4.74 3.77 - 5.28 10*6/mm3 Final "     Hemoglobin   Date Value Ref Range Status   09/14/2022 13.9 12.0 - 15.9 g/dL Final     Hematocrit   Date Value Ref Range Status   09/14/2022 44.1 34.0 - 46.6 % Final     Platelets   Date Value Ref Range Status   09/14/2022 189 140 - 450 10*3/mm3 Final                      Assessment & Plan    1.  History of least for the last 4 years, she has had an in crescendo mass in the left breast that has produced a gigantic tumor that WAS close to 25 cm in size and is replacing most of the anatomy of the left breast. There are areas of ulceration necrosis and bleeding and the mass is fixed to the chest wall. She has abundant amount of collateral circulation in the left anterior chest wall and it caught my attention the lack of any left axillary adenopathy. She has no lymphedema in the left upper extremity. In the right breast while in sitting position, no palpable abnormalities are documented. The right breast skin and nipple are normal. When the patient lies flat, there is a palpable mass at 9 o'clock from the nipple that measures probably 4 cm in size, very indistinct in regard to edges and margins. There was no mobility and no areas of tenderness. She has a giant adenopathy in the right axilla that measures close to 9 cm in size, uniform, no fluctuation, nontender, completely fixed to the axillary wall. There is no compromise of the skin.     After completion of 4 cycles of AC patient has had very dramatic improvement in all sites of disease including right axilla and left breast.    Completed 4 cycles Adriamycin Cytoxan on 4/12/2019.    Patient started weekly Taxol treatments on 5/3/2019.  Completed 12 weekly Taxol treatments on 7/19/2019.  9/16/2019 bilateral mastectomy by Dr. Joby Barron with axillary lymph node dissection.  No residual malignancy.  10/30/2019 patient's Mediport was removed in anticipation of radiation.  Radiation therapy under the care of Dr. Marmolejo 10/31/2019-1/13/2020 to the right chest  ariel.  Started on adjuvant Femara 2.5 mg daily 8/20/2019.  9/21/2020 continues on adjuvant Femara, tolerating it quite well.  Some mild hot flashes and mild arthralgias, all which are tolerable.  I advised the patient that at this point her breast cancer remains in remission. She is 100% compliant of her medication letrozole and her clinical examination remains negative normal for breast cancer recurrence.   The patient was further reviewed on 04/16/2021. Her clinical examination is completely negative normal and her questioning is negative in regard to symptoms that would suggest breast cancer recurrence. She is 100% compliant with her Femara. Her white count, hemoglobin and platelets remain normal. Her tumor marker during the previous visit was normal and we have another one pending today CA 15-3.   The patient was further reviewed on 05/17/2021 along with her brother. The clinical examination has not changed. The only new complaint is the progressive amount of discomfort and the inability to function given the pain in the left hip area. Now she is limping. The only time when she is not in discomfort with the left hip is when she is sitting or lying in bed or riding the horse when gravity takes away the pressure of her left hip area. Radiologically speaking the CT scan of the chest, abdomen and pelvis to my eyes disclosed no abnormalities besides a very simple ovarian cyst that measures close to 7 x 5 cm with no trabeculation. There is no pelvic ascites. There is no pelvic adenopathy. The other abnormality obviously visible is the severe degree of scoliosis of the thoracic, lumbar spines.     It caught my attention the subchondral cyst in the left hip and the minimal if any space leftover between the head of the femur and the acetabulum. There is no metastasis in this anatomical site.     The radiologist mentioned cardiophrenic angle lymphadenopathy. I cannot see that from my naked eyes. I do not know what it is  and I do not know how to express it. Pulmonary anatomy is normal. Hilar adenopathy is normal. No pericardial effusion. No pleural effusion. Minimal infiltrate in the lungs related to radiation changes. Liver anatomy, pancreas and kidneys were unremarkable. The scoliosis is very obvious in the view of the abdomen.     Her CA 15-3 was minimal elevated in comparison to previous assessment and it raises the following question.   1. Is the cardiophrenic node described by the radiologist indeed significant of breast cancer recurrence or not? The only way to know will be to do a PET scan. This will be scheduled.   2. She is known for having an ovarian cyst for a long time but now is getting bigger, 7 cm across, and has no TABICATION with no pelvic ascites. I do not believe that this is representation of malignancy; nevertheless, I am going to run a CA-125 on her and I will proceed with a pelvic ultrasound as well as a GYN oncology referral for review.   3. I will send a notification to Dr. Leonard, the patient’s orthopedic surgeon, in regard to all her issues pertinent to the left hip. I think the patient is getting closer and closer to having a left hip replacement. 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 She recovered extremely well from her right hip surgery before.     The patient returned to the office on 05/24/2021. Since the previous visit the patient proceeded with a PET scan and I have reviewed this with her in the PAC system showing chest wall on the left side area of enlightening SUV activity that is almost 3 cm long x 7 mm wide. It is behind the bone. It is very close to the lower aspect of her heart. The reset of the PET scan discloses no activity. This is also specific in regard to the ovaries and actually an ultrasound of her vagina looking into the ovaries documented an unilocular cyst on the  left side with typical features of benignity and a  was completely normal 5.5.     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 The rest of the PET scan is very much negative normal. I would not be surprised if this is an area of the internal mammary node chain.     Obviously the ovarian cyst is benign only locular with a normal  and I find no reason to refer her to GYN oncology anymore.     Therefore my advice to her is as follows:  1. She will remain on her letrozole 2.5 mg a day.  2. She will initiate Kisqali at the usual dose 3 weeks on 1 week off making her aware of the side effects of the medicine including leukopenia, nausea, vomiting, rash, diarrhea, abnormalities in the liver function test and neutropenic fever. She will take the medicine at least for the next 6 months.  3. The patient will proceed with referral to radiation oncology to treat this area completely.  4. I am going to go ahead and make a referral to Cardiology in preparation for this site of radiation therapy and knowing that tangential fields will be difficult to produce, I think it will be important to have her to be seen by Cardiology in preparation of Kisqali use. Also I advised her that I would like for her to take a magnesium supplement and she needs to eat plenty of nuts to minimize hypomagnesemia that can help her to prolong QT interval that is the most important side effect of Kisqali to my eyes. I advised her to continue her blood pressure medication and I advised her also to have an EKG today and also have a repeated EKG upon return in 3 weeks.    5. The patient will be having formal education and consent for Kisqali and she knows that this medicine will come to her from a speciality pharmacy.   6. The patient will require to have a repeat  PET scan at least 6-8 weeks after completion of her radiation therapy to the chest wall.   7. I advised her and her brother present on the telephone of all of these events and she was ready to proceed.     The patient was further reviewed on 07/07/2021 after she has continued her Kisqali and completion of the 1st round of the medicine. Today her EKG disclosed a normal QT interval and this has been sent to Cardiology to be reported officially. 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 She has completed her radiation therapy to the epicardial right lymph node with no difficulties or side effects.     The thing that is bothering her the most is the pain in the left hip associated with advanced degenerative arthritis and she is limping substantially and having discomfort requiring to take pain medicine, Voltaren, on a regular schedule. She already has been scheduled to have her hip replaced in a few days. In preparation for this I have advised the patient the following.   1. She will discontinue her aspirin and her nonsteroidal anti-inflammatory medications a week in advance for her surgery. This will minimize the potential for bleeding.   2. The patient will hold off on the Kisqali until I review her back in the office in 6 weeks. She will require a blood count done 3 days before the surgery at the same time that she will require her official COVID test before the surgical intervention.   3. The patient will remain on Femara for the time being at 2.5 mg a day. She has no need to stop this medication anytime besides the day of the surgery. She will resume this after the surgery and as soon as she is able to receive oral route.   4. Eventually the patient will require radiological assessment upon review in order to see what happened to the epicardial lymph node.           The patient was further reviewed on 09/30/2021. She has recovered further from the left hip surgery but she is not  doing physical therapy anymore. She has a minimal limp and I advised her to go back on physical therapy on her own at home. She knows how to do the exercises and she has the afternoon off every single day.    The patient completed her Kisqali a week ago. Her white count is low today. The hemoglobin is stable. The platelet count is stable. She has had issues with the consecution of the Kisqali but the pharmacists have been working on this process with  her specialty pharmacy.     Today it caught my attention the significant bump in her liver function test, SGOT, SGPT. The patient is not drinking any alcohol. She is not taking any cholesterol medicine. She is not taking any anti-inflammatory medication and leads me to think that Kisqali is the culprit of this. Given these findings we advised the patient to put the Kisqali on hold until we recheck chemistry profile on weekly basis for the next 3 or 4 weeks. We need to settle these numbers down before she continues the Kisqali. She probably will require dose adjustment at some point. Again, her hepatitis A, B and C serologies are negative.     Instructed pharmacist to discuss these issues with her as well.     She will be called at home with the report of her CA 15-3.     I encouraged her to remain on her Femara though.     We provided her blood pressure medication. She will remain on this for the time being.     1. In regard to her history of breast cancer she was reviewed on 11/19/2021. Since the previous visit the patient has had tumor marker CA 15-3 that has dropped to 20. Radiological assessment of her chest and abdomen CT scan that documents resolution of the epicardial lymphadenopathy in the right hemithorax, no pulmonary nodules or metastasis, no pleural effusion and no liver metastasis. No bone metastasis. Based on this information I do believe that the breast cancer is relatively quiet clinically, biochemically and radiologically and I advised her today to  resume her Femara at the dose of 2.5 mg a day. The likelihood of Femara producing liver dysfunction is more than remote.   2. This patient has developed very significant abnormalities in her liver function test with persistent elevation of the SGOT, SGPT and alkaline phosphatase and normal bilirubin. 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 I have tested a multitude of liver issues including hepatitis A, B and C serologies that were negative, antitrypsin 1 that was normal, antimicrosomal antibodies that was normal, and AARON. Anti-celiac sprue panel also was done, ferritin level, copper also were done looking for Stephen's disease and hemochromatosis. All of these conditions are basically ruled out. I even went ahead and scheduled her to have a liver biopsy that documents according to the pathologist inflammatory process in the liver consistent with drug effect.    We have discontinued the Kisqali 2 months ago thinking that that was the culprit but the numbers have not improved, then we discontinued most of the other supplemental medicines that she was taking, this led the numbers to improve and the only thing that she is left taking is Voltaren. She takes the Voltaren for her arthritis. I advised her on 11/19/2021 that she must discontinue the Voltaren and hopefully this will be making her numbers in regard to liver dysfunction to improve.     We are sure that this patient is not drinking alcohol at all.    We reviewed her medication list today and the only thing that she is taking is metoprolol and vitamin D. We asked her to remain on these medicines.     I even went ahead a couple of weeks ago to put her on a low dose prednisone to see if this had any benefit in her liver function test and it has not. I asked her today to discontinue this medicine as well.     I insisted today that the most likely reason why her liver function test is abnormal after all of the reviewing that we have done and also  reviewing an alpha fetoprotein that was negative normal is drug effect. The only medicine that is leftover is antiinflammatory medication. Metoprolol doing this is kind of unexpected and she does not take Tylenol in enough amount to trigger this abnormality neither. She raised the question if she could take a Tylenol here and there and I think for now it will be okay but she will need to deal with her arthritic symptoms in a topical way or physical therapy way, acupuncture or some other methodology.     I would like for her to come to the office every couple of weeks to do a CBC and a CMP and nurse visit. I will review her back in 6 weeks with a CBC, CMP and CA 15-3. I expect that if the Voltaren is the culprit her liver function test should be normal in 6 weeks.     I will communicate all of these issues to Dar Kirkpatrick MD, who has seen her before. I do not know if he will agree with my assessment but he is welcome to pitch in with any other objection or idea.      I discussed all of these facts with the patient and her brother in the room. I went piece by piece and item by item over all of these issues and significance of each one of them. They understood this clearly.   The patient was further reviewed on 12/29/2021. Recent review of her liver function tests a few days ago documented persistent elevation of her SGOT, SGPT, and alkaline phosphatase. She has discontinued most of the medicines and I have no other choice but discontinue the Femara. Since then and actually today is the 1st day in many weeks that the SGOT and SGPT and alkaline phosphatase are improving. The patient actually remains asymptomatic in this regard. Her liver is not enlarged. She has no jaundice. She has no rash, no skin lesions and no other new alterations. That is good news. Her white blood count, hemoglobin and platelets remain stable. It seems that we are getting to the final diagnosis that Femara is the culprit medicine in regard  to the hepatitis induced by medication documented pathologically through a liver biopsy. That is extremely rare. As I posted above, I discussed with all my partners in regard to how many times through their careers prescribing Femara to multiple breast cancers they have seen Femara triggering hepatitis, none of them gave me a positive answer. I reviewed this information in [employer]ToDate and it is reported in less than 1% of patients taking this medication. I think we are in front of a rare situation but I want for her to withhold any further Femara treatment for the time being and I want to review her back in 3 weeks. If the liver function tests continue improving or normalize by then maybe we will have the answer once and for all. Raises the question what we will do next and I think the next medicine will be the utilization of Aromasin that has a different chemical component and different methodology for action. I made her aware of that. We are not going to go back in giving her Kisqali under the present circumstances given the confusion and all the issues pertinent to her liver dysfunction.     She already has been seen by Cardiology with no new issues or commentaries by Dr. Odell and her note has been reviewed today. Actually her blood pressure today looks terrific. Her pulse is normal. Her weight is stable. Her metoprolol is still ongoing in a minimal dose.     Therefore, she will return with a CBC, CMP stat in 3 weeks and further review at that point.  She was further reviewed on 01/25/2022. Her clinical examination is negative for metastasis, her liver is not enlarged and she has no jaundice. She discontinued her Femara close to 6 weeks ago and I am expecting to review her liver function test today. Also we requested tumor markers. They have been within normal limits.     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 She is advised to undergo therapy with Aromasin at the dose of 25 mg a day.  She will require to be reassessed in 1 month.     We are not going to reestablish Kisqali therapy until we are sure that her liver is going to be able to handle all of the medications at this point.    In regard to her blood pressure control we are going to send prescription to her pharmacy to have a year of refill on this medication. If the patient needs to go back on aspirin or not remains to be seen until we review her liver function test as well. She remains on her vitamin D supplement and she is taking minimal amount of Tylenol for pain.     I will review her back in a month with a CBC, CMP and a CA 15-3.     I discussed all of these facts with the patient and her brother present in the room.   The patient was further reviewed on 03/07/2022. Since the previous visit the patient has stopped the Femara in 12/2021 and today her liver function tests are completely back to normality. This proves the point that Femara was the culprit phenomenon that I never have seen and discussed with my partner, Danelle Cespedes MD. He never has seen this neither.     In any event the patient's liver function tests are back to normal today. The patient is tolerating her Aromasin extremely well with the exception of hot flashes but otherwise no other new issues. We have her CA 15-3 pending today. This will be discussed with her once that it becomes available. So far we have not documented any radiological or clinical progression of her breast cancer and she will remain again on the Aromasin for the time being.  I am not planning to add any Kisqali to her treatment at this point unless that the tumor marker has a dramatic increase.     In regard to her hypertension the patient will remain on her metoprolol that she has been taking for the time being. In regard to her previous history of stroke she will remain on a baby aspirin.     In regard to pain management for arthritis I asked her to remain on Tylenol to minimize the use of  antiinflammatory medication that could have a lot of issues in regard to side effects. She will be allowed to use an antiinflammatory medication like Aleve, ibuprofen, just very much on prn basis very sporadically.     I will review her back in 2 months with a CBC, CMP and a CA 15-3.    Otherwise I am not planning to have any other intervention or radiological analysis unless that again the tumor marker shows significant modification.     I discussed all of these facts with the patient and her daughter present in the room.      The patient returned on 05/09/2022 with no symptoms of anything in particular besides minimal respiratory allergies and pain in the left hip associated with her previous surgery. Her clinical examination today is very much unremarkable, she looks fantastic. She has no symptoms or signs of breast cancer recurrence. White count, hemoglobin and platelets are normal. Her tumor marker CA 15-3 has remained normal and actually lower than ever and compliance with Aromasin has been 100% with excellent tolerance.     Given the circumstances of this I advised her to remain on Aromasin 25 mg a day and I find no use for this patient to go back onto Kisqali or medicines of this nature.     From the point of view of her abnormal liver function abnormalities associated with Femara utilization her chemistry profile today is completely normal including SGOT, SGPT, bilirubin, alkaline phosphatase. On clinical grounds her liver is not enlarged. It is impressive to see how much hepatitis she got out of Femara and how quickly she got better after stopping the Femara. This is extremely unusual but it happened.    I notified the patient of this good finding and obviously she will never take Femara again.    I will review her back in 6-8 weeks to continue monitoring the clinical situation along with a CBC, CMP and CA 15-3.     Finally I encouraged her to remain on her aspirin and be sure that she takes her blood  pressure medication. I provide these medicines for her.    Also Toprol will be continued and gabapentin for hot flashes or insomnia could be utilized and could be utilized as well for pain.    I discussed all of these facts with the patient and her brother in the room.   On 08/16/2022 we reviewed the patient in regard to her history of breast cancer. Clinically she has not had any obvious recurrent disease in the chest wall in the lung anatomy or abdomen or pelvis. She remains on Aromasin adjuvantly and we are waiting to review tumor marker. She has a chest wall that is completely flat due to previous mastectomies and radiation therapy. There is no axillary adenopathies and the patient has in my opinion control of her disease process as far as we can tell. I advised her to remain on Aromasin 25 mg a day. I went ahead and scheduled a visit with us in a few weeks in order to further review radiological analysis that will be discussed below.  On 09/14/2022 the patient has had in the interim a CT scan of the chest, abdomen and pelvis for review personally. Her pulmonary anatomy remains normal, there is no pulmonary congestion, pleural effusions, pericardial effusion, cardiomegaly, hilar or mediastinal adenopathy. There is prepericardiac lymph node measuring almost 1.5 to 2 cm in size that is flat like a small finger that has been present before and has minimally enlarged. If this has anything to do with her previous history of breast cancer is difficult to state but this has been evaluated before with similarity in regards size and some degree of fluctuation. I think this does not constrain me from thinking with a normal tumor marker of CA 15-3 of 20 during the previous visit or compel to perform either removal or biopsy at this time or proceed with radiological assessment with a PET scan. I do believe that this is not representation of anything in particular. Her liver anatomy and the rest of the bony anatomy,  pancreas, kidneys, spleen and retroperitoneum remain unchanged. Given this finding I advised the patient to proceed and continue her Aromasin without the need to add any other medication to her regimen of medication especially in the background of A-fib. I advised her to proceed with reassessment with me in 6 weeks with a repeat CBC, CMP and CA 15-3. In that moment also we will obtain a liquid biopsy for further analysis.     Her liver function test has remained normal and Aromasin has not produced any chemical hepatitis. I advised her again to remain on this medicine by itself.       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 Her liver enzymes are completely normal today. She remains on Aromasin and obviously this patient never will be back on letrozole under any circumstances. This was documented through liver biopsy and through removal of the medication that triggered quick improvement in her liver function test.   On 09/14/2022 there is no evidence of drug induced hepatitis. Aromasin will be continued. The patient is aware that she never will be able to take Femara.       3.The patient has developed an episode of septic arthritis in many joints including left shoulder and left ankle. She required antibiotic therapy as per recently. Infectious Disease was involved in this. In fact I discussed the case with them on the telephone. I suggested choosing the PICC line to be placed on the right arm in order to be able to deliver antibiotic therapy according to the proper indication. She completed the PICC line and it was removed last week with no complications or problems. The right upper extremity is completely normal. It is difficult for me to know why she developed the septic arthritis. She is in contact with horses all the time. The pathogen that she had in the joint is very uncommon in my opinion and the patient had no obvious source including no sinuses, no dental source, no obvious cardiac  source, no gastrointestinal or genitourinary source and no skin source. It raises the question if this pathogen could evade to colonic source and I think I feel the obligation for this patient to have at least a CT scan of the abdomen and pelvis and eventually in several months from now consider a colonoscopy. Another consideration could be a Cologuard in some way. At least the patient’s infection seems to be under control. Her joints are still sore today including left shoulder, left ankle. Local inflammatory signs are very much gone. The only area of inflammation that she has in the 1st MP joint on the left hand probably represents osteoarthritis, no more than that. She will remain in observation from this point of view.  On 09/14/2022 the patient has no symptoms that would suggest any further spreading or new development of septic arthritis. My fear was related to the possible seeding of her hip replacement areas by bacteria during the bacteremia that she had not too long ago. It seems that that is not the case. Radiologically speaking I do not see any local inflammation or reaction in any of these anatomical sites. There is perfect symmetry in the hip areas in regard to all her hardware material. Her sedimentation rate is BACK TO normal. Her white count is normal and this process will be watched in absence of any other intervention.      4.The patient developed atrial fibrillation with rapid ventricular response during the hospitalization with septic arthritis. Echocardiogram documented very dilated left atrium. She is now receiving Eliquis, metoprolol, and digoxin. Her ventricular response is controlled today but she remains in AFib. She has requested a followup with Dr. Odell and I went ahead and made her an appointment. 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 If she had a fall and she is taking anticoagulants the only  thing that we are going to have is just trouble. She has sold one of the horses. She will sell the other horse very soon and she will remain on land for the time being. Her  is back in town and he is helping her with consecution of groceries and things of this nature under the present circumstances. I advised her to minimize any trauma.  On 09/14/2022 the patient will be seen by Electrophysiology tomorrow in regard to consideration of cardioversion for her AFib. She has discussed this with Dr. Odell and I have reviewed this data. That is perfectly fine from my point of view. I find no form of contraindication from my side of the story if this is the methodology that is chosen to try to revert her to normal sinus rhythm.      5.The patient has a grade 3 lymphedema in the left upper extremity so tight in the sleeve that she had them done before is not even fitting. She has no obvious infection as far as I can tell. I went and made an urgent referral to the Lymphedema Clinic today to see if further bandage and drainage of this and massage would be helpful to her in the long run to control the problem. I still advised her to be extremely careful in regard to any injury or lesions in the skin of the left upper extremity to minimize any potentiality of cellulitis. In the long run if she has any episodes she will need to be back on antibiotics right away.   On 09/14/2022 the patient's lymphedema in the left upper extremity continues. She already has an appointment to be seen by the lymphedema clinic, she will require a new sleeve and massage and interventional therapy for this. She is aware that she needs to avoid any trauma in the left upper extremity to minimize any cellulitis. I strongly believe as posted before that septic arthritis is part of her lymphedema issues.       6.In regard to hypertension management I have controlled this before. Now she is taking quite amount of metoprolol. I will give up the  management of this and now that she will see Dr. Odell, they will provide the care of this issue. I would not be surprised if the patient in the long run needs to be receiving another blood pressure medication given the fact that her blood pressure is still marginally elevated today. Taking digoxin, I do not think Lasix or hydrochlorothiazide will be a good choice because of the potential for hypokalemia unless the potassium is replaced and monitored very close. This will probably minimize the potentiality for digoxin toxicity.  On 09/14/2022 her blood pressure is controlled with the metoprolol that is provided by the cardiology team. She was advised again to avoid antiinflammatory medication for pain control.         7.The patient used to take copious amount of anti-inflammatory medication for arthritis, aches and pains. Given the fact that she has now been placed on anticoagulant, she is aware that anticoagulant and anti-inflammatory medicines are not good friends. The only medicine that she can take for pain is Tylenol. If the pain is bad enough she will require a prescription for Lortab that she is no longer taking.    8.On 08/16/2022 given the persistency of the pain in the left hip no matter what she does, I went ahead and scheduled a CT scan of the pelvis to be done before her next visit. Hopefully this will allow me to delineate the anatomy of this and I want to pray that this joint was not BACTERIAL SEEDING during the time of bacteremia and septic arthritis. So far on clinical grounds her white count is normal. We have requested a sedimentation rate to be done today and this will need to be monitored. She states that this hip goes out of socket frequently and she has to end up in the emergency room.     I discussed all these facts with the patient’s brother who came all the way from [place]. I reviewed the records pertinent to this patient's care including all the records available in Epic from the recent  hospitalization at [facility] and I discussed the case with her infectious disease physician.  On 09/14/2022 I do not see anything that suggests any spreading of inflammatory infectious process throughout her skeleton anymore. Her hips look very symmetric in regard to the hardware and she has no symptoms that suggest any new infection with a normal white count today and pending sedimentation rate.    9.This patient has very significant insomnia. The problem is what to provide her for this problem at this time. There are cardiac issues. I do believe that this patient will be better off at least for the next 6 weeks taking a benzodiazapine or something of this nature more than any other medication. I do not feel compelled to give her gabapentin that actually has not worked for her in the past. Therefore I went ahead and prescribed for her medication in this regard today to take it at bedtime to see if this allows her to sleep properly.     I will review the patient in general terms in 6 weeks as posted above with a CBC, CMP, CA 15-3, sedimentation rate and a liquid biopsy. Aromasin will be continued and I do not see contraindication for cardioversion on her after she is seen by the cardiology team. Discussed with her and her daughter in the room. Duration of the telemedicine visit today 20 minutes.

## 2022-09-15 ENCOUNTER — HOSPITAL ENCOUNTER (OUTPATIENT)
Dept: OCCUPATIONAL THERAPY | Facility: HOSPITAL | Age: 62
Setting detail: THERAPIES SERIES
Discharge: HOME | End: 2022-09-15

## 2022-09-15 ENCOUNTER — OFFICE VISIT (OUTPATIENT)
Dept: CARDIOLOGY | Facility: CLINIC | Age: 62
End: 2022-09-15

## 2022-09-15 VITALS
WEIGHT: 155 LBS | HEART RATE: 68 BPM | HEIGHT: 66 IN | DIASTOLIC BLOOD PRESSURE: 86 MMHG | SYSTOLIC BLOOD PRESSURE: 120 MMHG | BODY MASS INDEX: 24.91 KG/M2

## 2022-09-15 DIAGNOSIS — Z17.0 MALIGNANT NEOPLASM OF OVERLAPPING SITES OF BOTH BREASTS IN FEMALE, ESTROGEN RECEPTOR POSITIVE: ICD-10-CM

## 2022-09-15 DIAGNOSIS — C50.812 MALIGNANT NEOPLASM OF OVERLAPPING SITES OF BOTH BREASTS IN FEMALE, ESTROGEN RECEPTOR POSITIVE: ICD-10-CM

## 2022-09-15 DIAGNOSIS — C50.811 MALIGNANT NEOPLASM OF OVERLAPPING SITES OF BOTH BREASTS IN FEMALE, ESTROGEN RECEPTOR POSITIVE: ICD-10-CM

## 2022-09-15 DIAGNOSIS — C50.812 MALIGNANT NEOPLASM OF OVERLAPPING SITES OF LEFT BREAST IN FEMALE, ESTROGEN RECEPTOR POSITIVE: ICD-10-CM

## 2022-09-15 DIAGNOSIS — I97.2 POST-MASTECTOMY LYMPHEDEMA SYNDROME: Primary | ICD-10-CM

## 2022-09-15 DIAGNOSIS — Z17.0 MALIGNANT NEOPLASM OF OVERLAPPING SITES OF LEFT BREAST IN FEMALE, ESTROGEN RECEPTOR POSITIVE: ICD-10-CM

## 2022-09-15 DIAGNOSIS — I48.19 ATRIAL FIBRILLATION, PERSISTENT: Primary | ICD-10-CM

## 2022-09-15 PROCEDURE — 99204 OFFICE O/P NEW MOD 45 MIN: CPT | Performed by: INTERNAL MEDICINE

## 2022-09-15 PROCEDURE — 97535 SELF CARE MNGMENT TRAINING: CPT

## 2022-09-15 PROCEDURE — 97166 OT EVAL MOD COMPLEX 45 MIN: CPT

## 2022-09-15 PROCEDURE — 93000 ELECTROCARDIOGRAM COMPLETE: CPT | Performed by: INTERNAL MEDICINE

## 2022-09-15 RX ORDER — DICLOFENAC SODIUM 75 MG/1
75 TABLET, DELAYED RELEASE ORAL 2 TIMES DAILY
COMMUNITY
End: 2022-09-21 | Stop reason: HOSPADM

## 2022-09-15 NOTE — PROGRESS NOTES
Date of Office Visit: 09/15/2022  Encounter Provider: Manjeet Gustafson MD  Place of Service: [facility]  Patient Name: Marylu Georges  :1960    Chief Complaint   Patient presents with   • Atrial Fibrillation     New Patient Consult   :     HPI: Marylu Georges is a 62 y.o. female who presents today for new onset persistent atrial fibrillation     Atrial fibrillation was discovered in  at admission for infectious arthritis?    Treatment thus far is consisted of rate control with metoprolol, digoxin.  She is on anticoagulation with Eliquis, which she reports she is tolerating well.  It has caused some difficulty if she has arthritis and she has had difficulty functioning without NSAIDs.    Symptoms of atrial fibrillation consist of palpitations, and intermittent shortness of breath.    She has a history of metastatic breast cancer for which she is on maintenance chemotherapy.  Notably, there was some stable epicardial involvement.          Past Medical History:   Diagnosis Date   • Anemia in neoplastic disease    • Arthritis    • Arthritis of back    • Arthritis of neck    • Breast cancer (HCC)     Left   • CVA (cerebral vascular accident) (HCC)    • Fracture, fibula    • Fracture, finger    • Frozen shoulder    • Hip arthrosis    • Hip pain     RIGHT HIP... CYST   • History of fracture of leg    • History of radiation therapy     LAST TREATMENT     • Hypertension    • Knee swelling    • Limited joint range of motion (ROM)     RIGHT HIP   • Low back strain    • Periarthritis of shoulder    • Rotator cuff syndrome    • Skin sore     OPEN SORE LEFT BREAST   • Syncope    • Vertigo        Past Surgical History:   Procedure Laterality Date   • AXILLARY LYMPH NODE BIOPSY/EXCISION Right     LYMPH NODE UNDER RIGHT ARM-MALIGNANT (DOUBLE MASTECTOMY)   • BREAST BIOPSY Left     MALIGNANT   • FRACTURE SURGERY      Leg   • HARDWARE REMOVAL     •  INCISION AND DRAINAGE LEG Left 06/30/2022    Procedure: INCISION AND DRAINAGE left ankle;  Surgeon: Kenneth Tran MD;  Location: [facility] MAIN OR;  Service: Orthopedics;  Laterality: Left;   • JOINT REPLACEMENT  mar 2020,july 2021    hips   • MASTECTOMY W/ SENTINEL NODE BIOPSY Bilateral 09/16/2019    Procedure: BILATERLA MODIFIED RADICAL MASTECTOMY WITH BILATERAL SENTINEL LYMPH NODE BIOPSY;  Surgeon: Joby Barron Jr., MD;  Location: [facility] MAIN OR;  Service: General   • TOTAL HIP ARTHROPLASTY Right 03/02/2020    Procedure: RIGHT TOTAL HIP ARTHROPLASTY NATALY NAVIGATION;  Surgeon: Luis M Leonard MD;  Location: [facility] MAIN OR;  Service: Orthopedics;  Laterality: Right;   • TOTAL HIP ARTHROPLASTY Left 07/20/2021    Procedure: Posterior LEFT TOTAL HIP ARTHROPLASTY NATALY NAVIGATION;  Surgeon: Luis M Leonard MD;  Location: [facility] MAIN OR;  Service: Orthopedics;  Laterality: Left;   • VENOUS ACCESS DEVICE (PORT) INSERTION Right 02/01/2019    Procedure: INSERTION VENOUS ACCESS DEVICE;  Surgeon: Joby Barron Jr., MD;  Location: [facility];  Service: General   • VENOUS ACCESS DEVICE (PORT) REMOVAL N/A 10/30/2019    Procedure: Mediport Removal;  Surgeon: Joby Barron Jr., MD;  Location: [facility] OR;  Service: General       Social History     Socioeconomic History   • Marital status:      Spouse name: Cruz   • Number of children: 0   Tobacco Use   • Smoking status: Never Smoker   • Smokeless tobacco: Never Used   Vaping Use   • Vaping Use: Never used   Substance and Sexual Activity   • Alcohol use: Not Currently     Comment: 2 CUPS DAILY   • Drug use: No   • Sexual activity: Not Currently     Partners: Male     Birth control/protection: Abstinence       Family History   Problem Relation Age of Onset   • Lung cancer Father    • Irritable bowel syndrome Father    • Cancer Mother    • Breast cancer Mother    • Liver disease Mother    • Breast cancer Sister 48   • Breast cancer Maternal Grandmother   "  • Breast cancer Paternal Grandmother    • Breast cancer Maternal Uncle    • Malig Hyperthermia Neg Hx        Review of Systems   Constitutional: Negative.   Cardiovascular: Positive for palpitations.   Respiratory: Positive for shortness of breath.    Gastrointestinal: Negative.        Allergies   Allergen Reactions   • Benadryl [Diphenhydramine] Itching     INCREASES BLOOD PRESSURE   • Erythromycin GI Intolerance   • Levaquin [Levofloxacin] GI Intolerance   • Penicillins GI Intolerance         Current Outpatient Medications:   •  acetaminophen (TYLENOL) 325 MG tablet, Take 650 mg by mouth Every 6 (Six) Hours As Needed., Disp: , Rfl:   •  apixaban (ELIQUIS) 5 MG tablet tablet, Take 1 tablet by mouth Every 12 (Twelve) Hours. Indications: Atrial Fibrillation, Disp: 60 tablet, Rfl:   •  Cholecalciferol 250 MCG (93599 UT) tablet, Take 1 tablet by mouth. Patient stated dose as \"1500 mg\", Disp: , Rfl:   •  diclofenac (VOLTAREN) 75 MG EC tablet, Take 75 mg by mouth 2 (Two) Times a Day. RARE PRN, Disp: , Rfl:   •  Diclofenac Sodium (VOLTAREN) 1 % gel gel, Apply 4 g topically to the appropriate area as directed 4 (Four) Times a Day As Needed. RARE PRN, Disp: , Rfl:   •  digoxin (LANOXIN) 125 MCG tablet, Take 1 tablet by mouth Daily., Disp: , Rfl:   •  diphenhydrAMINE (BENADRYL) 50 MG capsule, Take 50 mg by mouth As Needed., Disp: , Rfl:   •  exemestane (AROMASIN) 25 MG chemo tablet, Take 1 tablet by mouth Daily., Disp: 30 tablet, Rfl: 3  •  famotidine (PEPCID) 20 MG tablet, Take 1 tablet by mouth 2 (Two) Times a Day Before Meals., Disp: , Rfl:   •  metoprolol succinate XL (TOPROL-XL) 50 MG 24 hr tablet, 150 mg., Disp: , Rfl:   •  polyethylene glycol (polyethylene glycol) 17 g packet, Take 17 g by mouth 2 (Two) Times a Day., Disp: , Rfl:   •  simethicone (MYLICON) 80 MG chewable tablet, Chew 1 tablet 4 (Four) Times a Day As Needed for Flatulence., Disp: , Rfl:   •  sodium chloride 0.65 % nasal spray,  1 Spray, Nasal, Drops, " "Q4H, PRN Nasal Congestion, 0 Refill(s), Disp: , Rfl:   •  tiZANidine (ZANAFLEX) 4 MG tablet, Take 1 tablet by mouth Every 6 (Six) Hours As Needed for Muscle Spasms., Disp: , Rfl:   •  zolpidem (AMBIEN) 5 MG tablet, Take 1 tablet by mouth At Night As Needed for Sleep., Disp: 30 tablet, Rfl: 1  No current facility-administered medications for this visit.    Facility-Administered Medications Ordered in Other Visits:   •  Chlorhexidine Gluconate Cloth 2 % pads 1 each, 1 each, Apply externally, Take As Directed, Luis M Leonard MD      Objective:     Vitals:    09/15/22 0842   BP: 120/86   Pulse: 68   Weight: 70.3 kg (155 lb)   Height: 167.6 cm (66\")     Body mass index is 25.02 kg/m².    PHYSICAL EXAM:    Vitals and nursing note reviewed.   Constitutional:       Appearance: Normal and healthy appearance.   Pulmonary:      Effort: Pulmonary effort is normal.   Cardiovascular:      Normal rate. Regular rhythm.      Murmurs: There is no murmur.   Edema:     Peripheral edema absent.   Skin:     General: Skin is warm.   Neurological:      General: No focal deficit present.      Mental Status: Alert, oriented to person, place, and time and oriented to person, place and time.   Psychiatric:         Attention and Perception: Attention and perception normal.         Mood and Affect: Mood and affect normal.         Behavior: Behavior is cooperative.             ECG 12 Lead    Date/Time: 9/15/2022 9:19 AM  Performed by: Manjeet Gustafson MD  Authorized by: Manjeet Gustafson MD   Comparison: compared with previous ECG from 8/18/2022  Similar to previous ECG  Rhythm: atrial fibrillation        I reviewed her echocardiogram.  It shows severe left atrial enlargement.  Normal ejection fraction.      Assessment:       Diagnosis Plan   1. Atrial fibrillation, persistent (HCC)  Cardioversion External in Cardiology Department   2. Malignant neoplasm of overlapping sites of both breasts in female, estrogen receptor positive (HCC)   "          Plan:       Persistent atrial fibrillation, new onset.  Symptoms of palpitations and mild shortness of breath.  Coexisting metastatic cancer, which is stable.  Recommend continuing anticoagulation with [id number] or of 1, but I feel that her cancer may increase her risk of stroke.  We will proceed with cardioversion and attempt to rhythm control.  Discussed likelihood of recurrence, possibility that further treatment would be needed in the future including antiarrhythmic drugs.    As always, it has been a pleasure to participate in your patient's care.      Sincerely,         Manjeet Gustafson MD

## 2022-09-15 NOTE — THERAPY EVALUATION
Outpatient Occupational Therapy Lymphedema Initial Evaluation  [facility]     Patient Name: Marylu Georges  : 1960  MRN: [id number]  Today's Date: 9/15/2022      Visit Date: 09/15/2022    Patient Active Problem List   Diagnosis   • Malignant neoplasm of overlapping sites of left breast in female, estrogen receptor positive (HCC)   • Family history of breast cancer in female   • Syncope   • Malignant neoplasm of overlapping sites of both breasts in female, estrogen receptor positive (HCC)   • Hypertension   • Encounter for long-term (current) use of other medications   • Drug-induced hepatitis   • Atrial fibrillation (HCC)   • Transaminitis   • Lymphedema of upper extremity, bilateral   • Rash   • Inflammatory arthritis   • Streptococcal arthritis of left ankle (HCC)   • New onset atrial fibrillation (HCC)        Past Medical History:   Diagnosis Date   • Anemia in neoplastic disease    • Arthritis    • Arthritis of back    • Arthritis of neck    • Breast cancer (HCC)     Left   • CVA (cerebral vascular accident) (HCC)    • Fracture, fibula    • Fracture, finger    • Frozen shoulder    • Hip arthrosis    • Hip pain     RIGHT HIP... CYST   • History of fracture of leg    • History of radiation therapy     LAST TREATMENT     • Hypertension    • Knee swelling    • Limited joint range of motion (ROM)     RIGHT HIP   • Low back strain    • Periarthritis of shoulder    • Rotator cuff syndrome    • Skin sore     OPEN SORE LEFT BREAST   • Syncope    • Vertigo         Past Surgical History:   Procedure Laterality Date   • AXILLARY LYMPH NODE BIOPSY/EXCISION Right     LYMPH NODE UNDER RIGHT ARM-MALIGNANT (DOUBLE MASTECTOMY)   • BREAST BIOPSY Left     MALIGNANT   • FRACTURE SURGERY      Leg   • HARDWARE REMOVAL     • INCISION AND DRAINAGE LEG Left 2022    Procedure: INCISION AND DRAINAGE left ankle;  Surgeon: Kenneth Tran MD;  Location: [facility];  Service: Orthopedics;   Laterality: Left;   • JOINT REPLACEMENT  mar 2020,july 2021    hips   • MASTECTOMY W/ SENTINEL NODE BIOPSY Bilateral 09/16/2019    Procedure: BILATERLA MODIFIED RADICAL MASTECTOMY WITH BILATERAL SENTINEL LYMPH NODE BIOPSY;  Surgeon: Joby Barron Jr., MD;  Location: [facility] MAIN OR;  Service: General   • TOTAL HIP ARTHROPLASTY Right 03/02/2020    Procedure: RIGHT TOTAL HIP ARTHROPLASTY NATALY NAVIGATION;  Surgeon: Luis M Leonard MD;  Location: [facility] MAIN OR;  Service: Orthopedics;  Laterality: Right;   • TOTAL HIP ARTHROPLASTY Left 07/20/2021    Procedure: Posterior LEFT TOTAL HIP ARTHROPLASTY NATALY NAVIGATION;  Surgeon: Luis M Leonard MD;  Location: [facility] MAIN OR;  Service: Orthopedics;  Laterality: Left;   • VENOUS ACCESS DEVICE (PORT) INSERTION Right 02/01/2019    Procedure: INSERTION VENOUS ACCESS DEVICE;  Surgeon: Joby Barron Jr., MD;  Location: [facility] OR [facility];  Service: General   • VENOUS ACCESS DEVICE (PORT) REMOVAL N/A 10/30/2019    Procedure: Mediport Removal;  Surgeon: Joby Barron Jr., MD;  Location: [facility] MAIN OR;  Service: General         Visit Dx:     ICD-10-CM ICD-9-CM   1. 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 Malignant neoplasm of overlapping sites of left breast in female, estrogen receptor positive (HCC)  C50.812 174.8    Z17.0 V86.0        Patient History     Row Name 09/15/22 1100             History    Chief Complaint Swelling  -RE      Date Current Problem(s) Began 06/25/22  -RE      Brief Description of Current Complaint Patient presents today for evaluation of B UE lymphedema. In June of this year she was hosptilized with L ankle and L hand septic arthritis as well as lumbar discitis, lumbar myositis, and L shoulder synovitis. She developed significant L UE edema when these multiple medical issues started. Some of the swelling has decreased in the L arm but is still significant. She reports she was unable to wear her sleeve due to the size of her arm.  -RE       Patient/Caregiver Goals Decrease swelling;Know what to do to help the symptoms  -RE      Hand Dominance right-handed  -RE      Related/Recent Hospitalizations Yes  -RE      Surgery/Hospitalization [facility] and [facility] for acute rehab  -RE      Are you or can you be pregnant No  -RE              Pain     Pain Location Ankle;Knee;Hip  -RE      Pain at Present 5  -RE              Fall Risk Assessment    Any falls in the past year: No  -RE              Services    Are you currently receiving Home Health services No  -RE              Daily Activities    Primary Language English  -RE      Are you able to read Yes  -RE      Are you able to write Yes  -RE      How does patient learn best? Listening;Reading;Demonstration  -RE      Teaching needs identified Management of Condition  -RE      Patient is concerned about/has problems with Other (comment)  edema  -RE      Does patient have problems with the following? Anxiety  -RE      Barriers to learning None  -RE      Pt Participated in POC and Goals Yes  -RE              Safety    Are you being hurt, hit, or frightened by anyone at home or in your life? No  -RE      Are you being neglected by a caregiver No  -RE      Have you had any of the following issues with Anxiety  -RE            User Key  (r) = Recorded By, (t) = Taken By, (c) = Cosigned By    Initials Name Provider Type    RE Shavonne Gonzalez OTR Occupational Therapist                 Lymphedema     Row Name 09/15/22 1100             Subjective Pain    Able to rate subjective pain? yes  -RE      Pre-Treatment Pain Level 5  -RE      Post-Treatment Pain Level 5  -RE      Subjective Pain Comment no lymphedema related pain  -RE              Lymphedema Assessment    Lymphedema Classification RUE:;LUE:;secondary;stage 2 (Spontaneously Irreversible)  -RE      Lymphedema Cancer Related Sx bilateral;radical mastectomy  -RE      Chemo Received yes  -RE      Radiation Therapy Received yes  -RE      Infections or Cellulitis? yes   "synovitis  in L shoulder; septic arthritis in L hand  -RE      Infection/Cellulitis Treatment IV antibiotics  -RE              LLIS - Physical Concerns    The amount of pain associated with my lymphedema is: 0  no lymphedema related pain  -RE      The amount of limb heaviness associated with my lymphedema is: 0  -RE      The amount of skin tightness associated with my lymphedema is: 1  -RE      The size of my swollen limb(s) seems: 2  -RE      Lymphedema affects the movement of my swollen limb(s): 1  -RE      The strength in my swollen limb(s) is: 1  -RE              LLIS - Psychosocial Concerns    Lymphedema affects my body image (i.e., \"how I think I look\"). 1  -RE      Lymphedema affects my socializing with others. 0  -RE      Lymphedema affects my intimate relations with spouse or partner (rate 0 if not applicable 0  -RE      Lymphedema \"gets me down\" (i.e., depression, frustration, or anger) 1  -RE      I must rely on others for help due to my lymphedema. 0  -RE      I know what to do to manage my lymphedema 2  -RE              LLIS - Functional Concerns    Lymphedema affects my ability to perform self-care activities (i.e. eating, dressing, hygiene) 0  -RE      Lymphedema affects my ability to perform routine home or work-related activities. 0  -RE      Lymphedema affects my performance of preferred leisure activities. 0  -RE      Lymphedema affects proper fit of clothing/shoes 1  -RE      Lymphedema affects my sleep 1  -RE              General ROM    GENERAL ROM COMMENTS B UE's are WNL  -RE              Lymphedema Edema Assessment    Ptting Edema Category By grade out of 4  -RE      Pitting Edema + 3/4;Moderate;Other (comment)  2+ in R  -RE      Edema Assessment Comment L: hand to axilla  R: wrist to axilla  -RE              Skin Changes/Observations    Location/Assessment Upper Extremity  -RE      Upper Extremity Conditions right:;left:;normal  -RE      Upper Extremity Color/Pigment bilateral:;normal  -RE   "    Skin Observations Comment --  -RE              Lymphedema Sensation    Lymphedema Sensation Reports RUE:;LUE:;normal  -RE              Lymphedema Measurements    Measurement Type(s) Circumferential  -RE      Circumferential Areas Upper extremities  -RE      Lymphedema Measurements Comments mignon UE's are affected  -RE              BUE Circumferential (cm)    Measurement Location 1 axilla  -RE      Left 1 28.5 cm  -RE      Right 1 28 cm  -RE      Measurement Location 2 15 cm above elbow  -RE      Left 2 27.5 cm  -RE      Right 2 26.8 cm  -RE      Measurement Location 3 10 cm above elbow  -RE      Left 3 28 cm  -RE      Right 3 25.5 cm  -RE      Measurement Location 4 5 cm above elbow  -RE      Left 4 28 cm  -RE      Right 4 23.7 cm  -RE      Measurement Location 5 Elbow  -RE      Left 5 27 cm  -RE      Right 5 24 cm  -RE      Measurement Location 6 5 cm below elbow  -RE      Left 6 27 cm  -RE      Right 6 24.5 cm  -RE      Measurement Location 7 10 cm below elbow  -RE      Left 7 24.5 cm  -RE      Right 7 22.5 cm  -RE      Measurement Location 8 15 cm below elbow  -RE      Left 8 20 cm  -RE      Right 8 19.5 cm  -RE      Measurement Location 9 20 cm below elbow  -RE      Left 9 15 cm  -RE      Right 9 16 cm  -RE      Measurement Location 10 Wrist  -RE      Left 10 15.5 cm  -RE      Right 10 16 cm  -RE      Measurement Location 11 Mid palm  -RE      Left 11 19 cm  -RE      Right 11 20 cm  -RE      LUE Circumferential Total 260 cm  -RE      RUE Circumferential Total 246.5 cm  -RE              Lymphedema Life Impact Scale Totals    A.  Total Q1 - Q17 (Do not include Q18) 11  -RE      B.  Total number of questions answered (Q1-Q17) 17  -RE      C. Divide A by B 0.65  -RE      D. Multiple C by 25 16.25  -RE            User Key  (r) = Recorded By, (t) = Taken By, (c) = Cosigned By    Initials Name Provider Type    RE Shavonne Gonzalez, HUEYR Occupational Therapist                        Therapy Education  Education Details:  "Discussed lymphedema treatment including estimated duration. Gave patient \"Healthy Habits for Lymphedema Patients\" and \"What is Lymphedema\" and reviewed with patient. Discussed disease process and what to expect from therapy.  Asked patient to start wearing sleeve daily on R arm.  Given: Edema management, Symptoms/condition management  Program: New  How Provided: Verbal, Written  Provided to: Patient  Level of Understanding: Teach back education performed, Verbalized  59907 - OT Self Care/Mgmt Minutes: 15         OT Goals     Row Name 09/15/22 1900          OT Short Term Goals    STG Date to Achieve 09/29/22  -RE     STG 1 Patient will demonstrate proper awareness of “What is Lymphedema?” and “Healthy Habits” for improved prevention, management, care of symptoms and ease of transition to self-care of condition.  -RE     STG 1 Progress New  -RE     STG 2 Patient independent and compliant with self-wrapping techniques of compression bandages with family member as indicated for improved self-management of condition.  -RE     STG 2 Progress New  -RE     STG 3 Patient will demonstrate decreased net edema of left upper extremity >/= 3-5 cm for decrease in symptoms, decreased risk of infection and improved skin care/transition to self-care of condition.  -RE     STG 3 Progress New  -RE            Long Term Goals    LTG Date to Achieve 10/13/22  -RE     LTG 1 Patient will demonstrate decreased net edema of left upper extremity >/= 6-15 cm for decrease in symptoms, decreased risk of infection and improved skin care/transition to self-care of condition.  -RE     LTG 1 Progress New  -RE     LTG 2 Patient independent and compliant with self-massage techniques with spouse/family member as needed for improved lymphatic drainage, decreased edema/symptoms, decreased risk of infection and improved transition to self-care of condition.  -RE     LTG 2 Progress New  -RE     LTG 3 Patient independent and compliant with initial home " exercise program focused on diaphragmatic breathing, range of motion, flexibility to decrease edema and improve lymphatic flow for decreased edema and decreased risk of infection.  -RE     LTG 3 Progress New  -RE     LTG 4 Patient independent and compliant with compression garments and night compression as indicated for self-management of edema.  -RE     LTG 4 Progress New  -RE     LTG 5 Patient to improve DASH/ QuickDASH score by 10 points in 4 weeks.  -RE     LTG 5 Progress New  -RE            Time Calculation    OT Goal Re-Cert Due Date 12/15/22  -RE           User Key  (r) = Recorded By, (t) = Taken By, (c) = Cosigned By    Initials Name Provider Type    RE Sahvonne Gonzalez, OTR Occupational Therapist                 OT Assessment/Plan     Row Name 09/15/22 1935          OT Assessment    Impairments Edema;Impaired lymphatic circulation  -RE     Assessment Comments Patient presents with signs and symptoms consistent with post mastectomy bilateral  UE lymphedema L>R.  Edema is 3+ in the left arm and 1-2+ in the right. Edema in left arm extends from fingers to axilla. She could benefit from Complete Decongestive Therapy to decrease edema, decrease the risk of infection and to learn self care strategies. Focus of treatment will be on the left arm. I recommended compression garments only for the right arm for now.  -RE     Please refer to paper survey for additional self-reported information Yes  -RE     OT Diagnosis post mastectomy Joe UE lymphedema  -RE     OT Rehab Potential Good  -RE     Patient/caregiver participated in establishment of treatment plan and goals Yes  -RE     Patient would benefit from skilled therapy intervention Yes  -RE            OT Plan    OT Frequency 3x/week  -RE     Predicted Duration of Therapy Intervention (OT) 4-6 weeks  -RE     Planned CPT's? OT EVAL MOD COMPLEXITY: 22794;OT THER PROC EA 15 MIN: 69186ND;OT SELF CARE/MGMT/TRAIN 15 MIN: 59634;OT MANUAL THERAPY EA 15 MIN: 36169  -RE      Planned Therapy Interventions (Optional Details) home exercise program;manual therapy techniques;patient/family education;other (see comments)  skin care and commpression bandaging  -RE           User Key  (r) = Recorded By, (t) = Taken By, (c) = Cosigned By    Initials Name Provider Type    Shavonne Douglas OTR Occupational Therapist                Outcome Measure Options: Quick DASH  Quick DASH  Open a tight or new jar.: Mild Difficulty  Do heavy household chores (e.g., wash walls, wash floors): Severe Difficulty  Carry a shopping bag or briefcase: Severe Difficulty  Wash your back: Unable  Use a knife to cut food: Moderate Difficulty  Recreational activities in which you take some force or impact through your arm, should or hand (e.g. golf, hammering, tennis, etc.): Unable  During the past week, to what extent has your arm, shoulder, or hand problem interfered with your normal social activites with family, friends, neighbors or groups?: Moderately  Arm, Shoulder, or hand pain: Moderate  Tingling (pins and needles) in your arm, shoulder, or hand: Mild  During the past week, how much difficulty have you had sleeping because of the pain in your arm, shoulder or hand?: Severe Difficulty  Quick Dash Comments: 63 % disability  Number of Questions Answered: 10  Quick DASH Score: 62.5         Time Calculation:   OT Start Time: 1045  OT Stop Time: 1145  OT Time Calculation (min): 60 min  Total Timed Code Minutes- OT: 15 minute(s)  Timed Charges  45286 - OT Self Care/Mgmt Minutes: 15  Untimed Charges  OT Eval/Re-eval Minutes: 45  Total Minutes  Timed Charges Total Minutes: 15  Untimed Charges Total Minutes: 45   Total Minutes: 60     Therapy Charges for Today     Code Description Service Date Service Provider Modifiers Qty    [id number]  OT SELF CARE/MGMT/TRAIN EA 15 MIN 9/15/2022 Shavonne Gonzalez OTR GO 1    [id number] HC OT EVAL MOD COMPLEXITY 3 9/15/2022 Shavonne Gonzalez OTR GO 1                    Shavonne Gonzalez  OTR  9/15/2022

## 2022-09-21 ENCOUNTER — HOSPITAL ENCOUNTER (OUTPATIENT)
Dept: CARDIOLOGY | Facility: HOSPITAL | Age: 62
Discharge: HOME | End: 2022-09-21
Admitting: INTERNAL MEDICINE

## 2022-09-21 VITALS
RESPIRATION RATE: 16 BRPM | HEART RATE: 63 BPM | OXYGEN SATURATION: 98 % | TEMPERATURE: 97.6 F | WEIGHT: 154 LBS | HEIGHT: 66 IN | BODY MASS INDEX: 24.75 KG/M2 | SYSTOLIC BLOOD PRESSURE: 136 MMHG | DIASTOLIC BLOOD PRESSURE: 94 MMHG

## 2022-09-21 DIAGNOSIS — I48.19 ATRIAL FIBRILLATION, PERSISTENT: ICD-10-CM

## 2022-09-21 LAB
MAXIMAL PREDICTED HEART RATE: 156 BPM
QT INTERVAL: 390 MS
STRESS TARGET HR: 133 BPM

## 2022-09-21 PROCEDURE — 92960 CARDIOVERSION ELECTRIC EXT: CPT

## 2022-09-21 PROCEDURE — 93005 ELECTROCARDIOGRAM TRACING: CPT | Performed by: INTERNAL MEDICINE

## 2022-09-21 PROCEDURE — 93010 ELECTROCARDIOGRAM REPORT: CPT | Performed by: INTERNAL MEDICINE

## 2022-09-21 PROCEDURE — 92960 CARDIOVERSION ELECTRIC EXT: CPT | Performed by: INTERNAL MEDICINE

## 2022-09-21 RX ORDER — SODIUM CHLORIDE 0.9 % (FLUSH) 0.9 %
10 SYRINGE (ML) INJECTION AS NEEDED
Status: DISCONTINUED | OUTPATIENT
Start: 2022-09-21 | End: 2022-09-21 | Stop reason: HOSPADM

## 2022-09-21 RX ORDER — SODIUM CHLORIDE 9 MG/ML
75 INJECTION, SOLUTION INTRAVENOUS CONTINUOUS
Status: DISCONTINUED | OUTPATIENT
Start: 2022-09-21 | End: 2022-09-22 | Stop reason: HOSPADM

## 2022-09-21 RX ORDER — METOPROLOL SUCCINATE 50 MG/1
50 TABLET, EXTENDED RELEASE ORAL 2 TIMES DAILY
Qty: 60 TABLET | Refills: 5
Start: 2022-09-21 | End: 2023-01-31 | Stop reason: SDUPTHER

## 2022-09-21 RX ORDER — SODIUM CHLORIDE 0.9 % (FLUSH) 0.9 %
10 SYRINGE (ML) INJECTION EVERY 12 HOURS SCHEDULED
Status: DISCONTINUED | OUTPATIENT
Start: 2022-09-21 | End: 2022-09-21 | Stop reason: HOSPADM

## 2022-09-21 RX ADMIN — Medication 50 MG: at 12:22

## 2022-09-21 RX ADMIN — SODIUM CHLORIDE 75 ML/HR: 9 INJECTION, SOLUTION INTRAVENOUS at 11:43

## 2022-10-03 DIAGNOSIS — C50.812 MALIGNANT NEOPLASM OF OVERLAPPING SITES OF LEFT BREAST IN FEMALE, ESTROGEN RECEPTOR POSITIVE: ICD-10-CM

## 2022-10-03 DIAGNOSIS — Z17.0 MALIGNANT NEOPLASM OF OVERLAPPING SITES OF LEFT BREAST IN FEMALE, ESTROGEN RECEPTOR POSITIVE: ICD-10-CM

## 2022-10-03 RX ORDER — EXEMESTANE 25 MG/1
TABLET ORAL
Qty: 30 TABLET | Refills: 3 | Status: SHIPPED | OUTPATIENT
Start: 2022-10-03 | End: 2023-01-30

## 2022-10-10 ENCOUNTER — HOSPITAL ENCOUNTER (OUTPATIENT)
Dept: OCCUPATIONAL THERAPY | Facility: HOSPITAL | Age: 62
Setting detail: THERAPIES SERIES
Discharge: HOME | End: 2022-10-10

## 2022-10-10 DIAGNOSIS — C50.812 MALIGNANT NEOPLASM OF OVERLAPPING SITES OF LEFT BREAST IN FEMALE, ESTROGEN RECEPTOR POSITIVE: ICD-10-CM

## 2022-10-10 DIAGNOSIS — I97.2 POST-MASTECTOMY LYMPHEDEMA SYNDROME: Primary | ICD-10-CM

## 2022-10-10 DIAGNOSIS — Z17.0 MALIGNANT NEOPLASM OF OVERLAPPING SITES OF LEFT BREAST IN FEMALE, ESTROGEN RECEPTOR POSITIVE: ICD-10-CM

## 2022-10-10 PROCEDURE — 97140 MANUAL THERAPY 1/> REGIONS: CPT

## 2022-10-10 NOTE — THERAPY TREATMENT NOTE
Outpatient Occupational Therapy Lymphedema Treatment Note  [facility]     Patient Name: Marylu Georges  : 1960  MRN: [id number]  Today's Date: 10/10/2022      Visit Date: 10/10/2022    Patient Active Problem List   Diagnosis   • Malignant neoplasm of overlapping sites of left breast in female, estrogen receptor positive (HCC)   • Family history of breast cancer in female   • Syncope   • Malignant neoplasm of overlapping sites of both breasts in female, estrogen receptor positive (HCC)   • Hypertension   • Encounter for long-term (current) use of other medications   • Drug-induced hepatitis   • Atrial fibrillation (HCC)   • Transaminitis   • Lymphedema of upper extremity, bilateral   • Rash   • Inflammatory arthritis   • Streptococcal arthritis of left ankle (HCC)   • New onset atrial fibrillation (HCC)        Past Medical History:   Diagnosis Date   • Anemia in neoplastic disease    • Arthritis    • Arthritis of back    • Arthritis of neck    • Breast cancer (HCC)     Left   • CVA (cerebral vascular accident) (HCC)    • Fracture, fibula    • Fracture, finger    • Frozen shoulder    • Hip arthrosis    • Hip pain     RIGHT HIP... CYST   • History of fracture of leg    • History of radiation therapy     LAST TREATMENT     • Hypertension    • Knee swelling    • Limited joint range of motion (ROM)     RIGHT HIP   • Low back strain    • Periarthritis of shoulder    • Rotator cuff syndrome    • Skin sore     OPEN SORE LEFT BREAST   • Syncope    • Vertigo         Past Surgical History:   Procedure Laterality Date   • AXILLARY LYMPH NODE BIOPSY/EXCISION Right     LYMPH NODE UNDER RIGHT ARM-MALIGNANT (DOUBLE MASTECTOMY)   • BREAST BIOPSY Left     MALIGNANT   • FRACTURE SURGERY      Leg   • HARDWARE REMOVAL     • INCISION AND DRAINAGE LEG Left 2022    Procedure: INCISION AND DRAINAGE left ankle;  Surgeon: Kenneth Tran MD;  Location: [facility];  Service: Orthopedics;   Laterality: Left;   • JOINT REPLACEMENT  mar 2020,july 2021    hips   • MASTECTOMY W/ SENTINEL NODE BIOPSY Bilateral 09/16/2019    Procedure: BILATERLA MODIFIED RADICAL MASTECTOMY WITH BILATERAL SENTINEL LYMPH NODE BIOPSY;  Surgeon: Joby Barron Jr., MD;  Location: [facility] MAIN OR;  Service: General   • TOTAL HIP ARTHROPLASTY Right 03/02/2020    Procedure: RIGHT TOTAL HIP ARTHROPLASTY NATALY NAVIGATION;  Surgeon: Luis M Leonard MD;  Location: [facility] MAIN OR;  Service: Orthopedics;  Laterality: Right;   • TOTAL HIP ARTHROPLASTY Left 07/20/2021    Procedure: Posterior LEFT TOTAL HIP ARTHROPLASTY NATALY NAVIGATION;  Surgeon: Luis M Leonard MD;  Location: [facility] MAIN OR;  Service: Orthopedics;  Laterality: Left;   • VENOUS ACCESS DEVICE (PORT) INSERTION Right 02/01/2019    Procedure: INSERTION VENOUS ACCESS DEVICE;  Surgeon: Joby Barron Jr., MD;  Location: [facility] OSC;  Service: General   • VENOUS ACCESS DEVICE (PORT) REMOVAL N/A 10/30/2019    Procedure: Mediport Removal;  Surgeon: Joby Barron Jr., MD;  Location: [facility] MAIN OR;  Service: General         Visit Dx:      ICD-10-CM ICD-9-CM   1. 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 Malignant neoplasm of overlapping sites of left breast in female, estrogen receptor positive (HCC)  C50.812 174.8    Z17.0 V86.0        Lymphedema     Row Name 10/10/22 1500             Subjective Pain    Able to rate subjective pain? yes  -RE      Pre-Treatment Pain Level 0  -RE      Post-Treatment Pain Level 0  -RE      Subjective Pain Comment no lymphedema related pain  -RE         Manual Lymphatic Drainage    Manual Lymphatic Drainage initial sequence;extremity treatment  -RE      Initial Sequence short neck;shoulder collectors  -RE      Shoulder Collectors right;left  -RE      Extremity Treatment MLD to full limb  left  -RE      MLD to Full Limb left  -RE         Compression/Skin Care    Compression/Skin Care Comments wear compression sleeve  -RE             User Key  (r) = Recorded By, (t) = Taken By, (c) = Cosigned By    Initials Name Provider Type    Shavonne Douglas OTR Occupational Therapist                         OT Assessment/Plan     Row Name 10/10/22 1445          OT Assessment    Assessment Comments B UE's  are significantly improved minimal edema in the right arm. L UE has minimal edema in the wrist and hand and moderate edema in the forearm and elbow. Following MLD edema around the elbow was softer and wrnkles were visible. Will try compression with sleeve first since symptoms are improved.  -RE        OT Plan    OT Plan Comments continue  -RE           User Key  (r) = Recorded By, (t) = Taken By, (c) = Cosigned By    Initials Name Provider Type    Shavonne Douglas OTR Occupational Therapist                    Manual Rx (last 36 hours)     Manual Treatments     Row Name 10/10/22 1508             Total Minutes    52260 - OT Manual Therapy Minutes 50  -RE            User Key  (r) = Recorded By, (t) = Taken By, (c) = Cosigned By    Initials Name Provider Type    Shavonne Douglas OTR Occupational Therapist                  Therapy Education  Education Details: sleeve use  Program: Reinforced  How Provided: Verbal  Provided to: Patient  Level of Understanding: Teach back education performed, Verbalized  69007 - OT Self Care/Mgmt Minutes: 5                Time Calculation:   OT Start Time: 1350  OT Stop Time: 1445  OT Time Calculation (min): 55 min  Total Timed Code Minutes- OT: 55 minute(s)  Timed Charges  98434 - OT Manual Therapy Minutes: 50  41511 - OT Self Care/Mgmt Minutes: 5  Total Minutes  Timed Charges Total Minutes: 55   Total Minutes: 55     Therapy Charges for Today     Code Description Service Date Service Provider Modifiers Qty    [id number]  OT MANUAL THERAPY EA 15 MIN 10/10/2022 Shavonne Gonzalez OTR GO 4                      PARAS Hackett  10/10/2022

## 2022-10-11 ENCOUNTER — HOSPITAL ENCOUNTER (OUTPATIENT)
Dept: OCCUPATIONAL THERAPY | Facility: HOSPITAL | Age: 62
Setting detail: THERAPIES SERIES
Discharge: HOME | End: 2022-10-11

## 2022-10-11 DIAGNOSIS — Z17.0 MALIGNANT NEOPLASM OF OVERLAPPING SITES OF LEFT BREAST IN FEMALE, ESTROGEN RECEPTOR POSITIVE: ICD-10-CM

## 2022-10-11 DIAGNOSIS — I97.2 POST-MASTECTOMY LYMPHEDEMA SYNDROME: Primary | ICD-10-CM

## 2022-10-11 DIAGNOSIS — C50.812 MALIGNANT NEOPLASM OF OVERLAPPING SITES OF LEFT BREAST IN FEMALE, ESTROGEN RECEPTOR POSITIVE: ICD-10-CM

## 2022-10-11 PROCEDURE — 97140 MANUAL THERAPY 1/> REGIONS: CPT

## 2022-10-11 NOTE — THERAPY TREATMENT NOTE
Outpatient Occupational Therapy Lymphedema Treatment Note  [facility]     Patient Name: Marylu Georges  : 1960  MRN: [id number]  Today's Date: 10/11/2022      Visit Date: 10/11/2022    Patient Active Problem List   Diagnosis   • Malignant neoplasm of overlapping sites of left breast in female, estrogen receptor positive (HCC)   • Family history of breast cancer in female   • Syncope   • Malignant neoplasm of overlapping sites of both breasts in female, estrogen receptor positive (HCC)   • Hypertension   • Encounter for long-term (current) use of other medications   • Drug-induced hepatitis   • Atrial fibrillation (HCC)   • Transaminitis   • Lymphedema of upper extremity, bilateral   • Rash   • Inflammatory arthritis   • Streptococcal arthritis of left ankle (HCC)   • New onset atrial fibrillation (HCC)        Past Medical History:   Diagnosis Date   • Anemia in neoplastic disease    • Arthritis    • Arthritis of back    • Arthritis of neck    • Breast cancer (HCC)     Left   • CVA (cerebral vascular accident) (HCC)    • Fracture, fibula    • Fracture, finger    • Frozen shoulder    • Hip arthrosis    • Hip pain     RIGHT HIP... CYST   • History of fracture of leg    • History of radiation therapy     LAST TREATMENT     • Hypertension    • Knee swelling    • Limited joint range of motion (ROM)     RIGHT HIP   • Low back strain    • Periarthritis of shoulder    • Rotator cuff syndrome    • Skin sore     OPEN SORE LEFT BREAST   • Syncope    • Vertigo         Past Surgical History:   Procedure Laterality Date   • AXILLARY LYMPH NODE BIOPSY/EXCISION Right     LYMPH NODE UNDER RIGHT ARM-MALIGNANT (DOUBLE MASTECTOMY)   • BREAST BIOPSY Left     MALIGNANT   • FRACTURE SURGERY      Leg   • HARDWARE REMOVAL     • INCISION AND DRAINAGE LEG Left 2022    Procedure: INCISION AND DRAINAGE left ankle;  Surgeon: Kenneth Tran MD;  Location: [facility];  Service: Orthopedics;   Laterality: Left;   • JOINT REPLACEMENT  mar 2020,july 2021    hips   • MASTECTOMY W/ SENTINEL NODE BIOPSY Bilateral 09/16/2019    Procedure: BILATERLA MODIFIED RADICAL MASTECTOMY WITH BILATERAL SENTINEL LYMPH NODE BIOPSY;  Surgeon: Joby Barron Jr., MD;  Location: [facility] MAIN OR;  Service: General   • TOTAL HIP ARTHROPLASTY Right 03/02/2020    Procedure: RIGHT TOTAL HIP ARTHROPLASTY NATALY NAVIGATION;  Surgeon: Luis M Leonard MD;  Location: [facility] MAIN OR;  Service: Orthopedics;  Laterality: Right;   • TOTAL HIP ARTHROPLASTY Left 07/20/2021    Procedure: Posterior LEFT TOTAL HIP ARTHROPLASTY NATALY NAVIGATION;  Surgeon: Luis M Leonard MD;  Location: [facility] MAIN OR;  Service: Orthopedics;  Laterality: Left;   • VENOUS ACCESS DEVICE (PORT) INSERTION Right 02/01/2019    Procedure: INSERTION VENOUS ACCESS DEVICE;  Surgeon: Joby Barron Jr., MD;  Location: [facility];  Service: General   • VENOUS ACCESS DEVICE (PORT) REMOVAL N/A 10/30/2019    Procedure: Mediport Removal;  Surgeon: Joby Barron Jr., MD;  Location: [facility] MAIN OR;  Service: General         Visit Dx:      ICD-10-CM ICD-9-CM   1. 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 Malignant neoplasm of overlapping sites of left breast in female, estrogen receptor positive (HCC)  C50.812 174.8    Z17.0 V86.0        Lymphedema     Row Name 10/11/22 1300             Subjective Pain    Able to rate subjective pain? yes  -RE      Pre-Treatment Pain Level 0  -RE      Post-Treatment Pain Level 0  -RE         BUE Circumferential (cm)    Measurement Location 1 axilla  -RE      Left 1 30 cm  -RE      Right 1 30 cm  -RE      Measurement Location 2 15 cm above elbow  -RE      Left 2 27.3 cm  -RE      Right 2 29 cm  -RE      Measurement Location 3 10 cm above elbow  -RE      Left 3 27 cm  -RE      Right 3 25.5 cm  -RE      Measurement Location 4 5 cm above elbow  -RE      Left 4 26.3 cm  -RE      Right 4 24.5 cm  -RE      Measurement Location 5 Elbow   -RE      Left 5 25.8 cm  -RE      Right 5 24.5 cm  -RE      Measurement Location 6 5 cm below elbow  -RE      Left 6 25.5 cm  -RE      Right 6 24.5 cm  -RE      Measurement Location 7 10 cm below elbow  -RE      Left 7 23.5 cm  -RE      Right 7 24 cm  -RE      Measurement Location 8 15 cm below elbow  -RE      Left 8 19.9 cm  -RE      Right 8 20 cm  -RE      Measurement Location 9 20 cm below elbow  -RE      Left 9 15.5 cm  -RE      Right 9 16.5 cm  -RE      Measurement Location 10 Wrist  -RE      Left 10 15.5 cm  -RE      Right 10 16 cm  -RE      Measurement Location 11 Mid palm  -RE      Left 11 19 cm  -RE      Right 11 19.5 cm  -RE      LUE Circumferential Total 255.3 cm  -RE      RUE Circumferential Total 254 cm  -RE         Manual Lymphatic Drainage    Manual Lymphatic Drainage initial sequence;opened regional lymph nodes;extremity treatment  -RE      Initial Sequence short neck  -RE      Shoulder Collectors right;left  -RE      Opened Regional Lymph Nodes inguinal  -RE      Extremity Treatment MLD to full limb  -RE      MLD to Full Limb R and L  -RE         Compression/Skin Care    Compression/Skin Care Comments continue with compression sleeve on L  -RE            User Key  (r) = Recorded By, (t) = Taken By, (c) = Cosigned By    Initials Name Provider Type    Shavonne Douglas OTR Occupational Therapist                         OT Assessment/Plan     Row Name 10/11/22 5963          OT Assessment    Assessment Comments Minimal pitting edema in the R UE but circumference is increased from eval. L UE edema has decreased overall but pitting edema persists in in the forearm. Following MLD edema in the left arm was improved.  -RE           User Key  (r) = Recorded By, (t) = Taken By, (c) = Cosigned By    Initials Name Provider Type    Shavonne Douglas OTR Occupational Therapist                    Manual Rx (last 36 hours)     Manual Treatments     Row Name 10/11/22 5867             Total Minutes    03799 - OT  Manual Therapy Minutes 55  -RE            User Key  (r) = Recorded By, (t) = Taken By, (c) = Cosigned By    Initials Name Provider Type    Shavonne Douglas OTR Occupational Therapist                  Therapy Education  Given: Edema management  Program: Reinforced  How Provided: Verbal  Provided to: Patient  Level of Understanding: Teach back education performed, Verbalized  27434 - OT Self Care/Mgmt Minutes: 5                Time Calculation:   OT Start Time: 1345  OT Stop Time: 1445  OT Time Calculation (min): 60 min  Total Timed Code Minutes- OT: 60 minute(s)  Timed Charges  11845 - OT Manual Therapy Minutes: 55  58352 - OT Self Care/Mgmt Minutes: 5  Total Minutes  Timed Charges Total Minutes: 60   Total Minutes: 60     Therapy Charges for Today     Code Description Service Date Service Provider Modifiers Qty    [id number]  OT MANUAL THERAPY EA 15 MIN 10/11/2022 Shavonne Gonzalez OTR GO 4                      PARAS Hackett  10/11/2022

## 2022-10-13 ENCOUNTER — HOSPITAL ENCOUNTER (OUTPATIENT)
Dept: OCCUPATIONAL THERAPY | Facility: HOSPITAL | Age: 62
Setting detail: THERAPIES SERIES
Discharge: HOME | End: 2022-10-13

## 2022-10-13 DIAGNOSIS — I97.2 POST-MASTECTOMY LYMPHEDEMA SYNDROME: Primary | ICD-10-CM

## 2022-10-13 DIAGNOSIS — C50.812 MALIGNANT NEOPLASM OF OVERLAPPING SITES OF LEFT BREAST IN FEMALE, ESTROGEN RECEPTOR POSITIVE: ICD-10-CM

## 2022-10-13 DIAGNOSIS — Z17.0 MALIGNANT NEOPLASM OF OVERLAPPING SITES OF LEFT BREAST IN FEMALE, ESTROGEN RECEPTOR POSITIVE: ICD-10-CM

## 2022-10-13 PROCEDURE — 97140 MANUAL THERAPY 1/> REGIONS: CPT

## 2022-10-13 NOTE — THERAPY TREATMENT NOTE
Outpatient Occupational Therapy Lymphedema Treatment Note  [facility]     Patient Name: Marylu Georges  : 1960  MRN: [id number]  Today's Date: 10/13/2022      Visit Date: 10/13/2022    Patient Active Problem List   Diagnosis   • Malignant neoplasm of overlapping sites of left breast in female, estrogen receptor positive (HCC)   • Family history of breast cancer in female   • Syncope   • Malignant neoplasm of overlapping sites of both breasts in female, estrogen receptor positive (HCC)   • Hypertension   • Encounter for long-term (current) use of other medications   • Drug-induced hepatitis   • Atrial fibrillation (HCC)   • Transaminitis   • Lymphedema of upper extremity, bilateral   • Rash   • Inflammatory arthritis   • Streptococcal arthritis of left ankle (HCC)   • New onset atrial fibrillation (HCC)        Past Medical History:   Diagnosis Date   • Anemia in neoplastic disease    • Arthritis    • Arthritis of back    • Arthritis of neck    • Breast cancer (HCC)     Left   • CVA (cerebral vascular accident) (HCC)    • Fracture, fibula    • Fracture, finger    • Frozen shoulder    • Hip arthrosis    • Hip pain     RIGHT HIP... CYST   • History of fracture of leg    • History of radiation therapy     LAST TREATMENT     • Hypertension    • Knee swelling    • Limited joint range of motion (ROM)     RIGHT HIP   • Low back strain    • Periarthritis of shoulder    • Rotator cuff syndrome    • Skin sore     OPEN SORE LEFT BREAST   • Syncope    • Vertigo         Past Surgical History:   Procedure Laterality Date   • AXILLARY LYMPH NODE BIOPSY/EXCISION Right     LYMPH NODE UNDER RIGHT ARM-MALIGNANT (DOUBLE MASTECTOMY)   • BREAST BIOPSY Left     MALIGNANT   • FRACTURE SURGERY      Leg   • HARDWARE REMOVAL     • INCISION AND DRAINAGE LEG Left 2022    Procedure: INCISION AND DRAINAGE left ankle;  Surgeon: Kenneth Tran MD;  Location: [facility];  Service: Orthopedics;   Laterality: Left;   • JOINT REPLACEMENT  mar 2020,july 2021    hips   • MASTECTOMY W/ SENTINEL NODE BIOPSY Bilateral 09/16/2019    Procedure: BILATERLA MODIFIED RADICAL MASTECTOMY WITH BILATERAL SENTINEL LYMPH NODE BIOPSY;  Surgeon: Joby Barron Jr., MD;  Location: [facility] MAIN OR;  Service: General   • TOTAL HIP ARTHROPLASTY Right 03/02/2020    Procedure: RIGHT TOTAL HIP ARTHROPLASTY NATALY NAVIGATION;  Surgeon: Luis M Leonard MD;  Location: [facility] MAIN OR;  Service: Orthopedics;  Laterality: Right;   • TOTAL HIP ARTHROPLASTY Left 07/20/2021    Procedure: Posterior LEFT TOTAL HIP ARTHROPLASTY NATALY NAVIGATION;  Surgeon: Luis M Leonard MD;  Location: [facility] MAIN OR;  Service: Orthopedics;  Laterality: Left;   • VENOUS ACCESS DEVICE (PORT) INSERTION Right 02/01/2019    Procedure: INSERTION VENOUS ACCESS DEVICE;  Surgeon: Joby Barron Jr., MD;  Location: [facility] OR [facility];  Service: General   • VENOUS ACCESS DEVICE (PORT) REMOVAL N/A 10/30/2019    Procedure: Mediport Removal;  Surgeon: Joby Barron Jr., MD;  Location: [facility] MAIN OR;  Service: General         Visit Dx:      ICD-10-CM ICD-9-CM   1. 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 Malignant neoplasm of overlapping sites of left breast in female, estrogen receptor positive (HCC)  C50.812 174.8    Z17.0 V86.0        Lymphedema     Row Name 10/13/22 1700             Subjective Pain    Able to rate subjective pain? yes  -RE      Pre-Treatment Pain Level 0  -RE      Post-Treatment Pain Level 0  -RE         Manual Lymphatic Drainage    Manual Lymphatic Drainage initial sequence;opened regional lymph nodes;extremity treatment;opened anastamoses  -RE      Initial Sequence short neck  -RE      Shoulder Collectors right;left  -RE      Opened Regional Lymph Nodes inguinal  -RE      Inguinal left  -RE      Opened Anastamoses axillo-inguinal  -RE      Axillo-Inguinal left  -RE      Extremity Treatment MLD to full limb  -RE      MLD to Full Limb left  only  -RE         Compression/Skin Care    Compression/Skin Care Comments continue with compression sleeve  -RE            User Key  (r) = Recorded By, (t) = Taken By, (c) = Cosigned By    Initials Name Provider Type    Shavonne Douglas OTR Occupational Therapist                         OT Assessment/Plan     Row Name 10/13/22 1728          OT Assessment    Assessment Comments L UE edema is softer with decreased pitting but forearm and elbow continue with significant lymphedema. progressing  -RE        OT Plan    OT Plan Comments continue  -RE           User Key  (r) = Recorded By, (t) = Taken By, (c) = Cosigned By    Initials Name Provider Type    Shavonne Douglas OTR Occupational Therapist                    Manual Rx (last 36 hours)     Manual Treatments     Row Name 10/13/22 1729             Total Minutes    01028 - OT Manual Therapy Minutes 40  -RE            User Key  (r) = Recorded By, (t) = Taken By, (c) = Cosigned By    Initials Name Provider Type    Shavonne Douglas OTR Occupational Therapist                  Therapy Education  Given: Symptoms/condition management, Edema management  Program: Reinforced  How Provided: Verbal  Provided to: Patient  Level of Understanding: Teach back education performed, Verbalized  48061 - OT Self Care/Mgmt Minutes: 5                Time Calculation:   OT Start Time: 1030  OT Stop Time: 1115  OT Time Calculation (min): 45 min  Total Timed Code Minutes- OT: 45 minute(s)  Timed Charges  87705 - OT Manual Therapy Minutes: 40  53800 - OT Self Care/Mgmt Minutes: 5  Total Minutes  Timed Charges Total Minutes: 45   Total Minutes: 45     Therapy Charges for Today     Code Description Service Date Service Provider Modifiers Qty    [id number]  OT MANUAL THERAPY EA 15 MIN 10/13/2022 Shavonne Gonzalez OTR GO 3                      PARAS Hackett  10/13/2022

## 2022-10-17 ENCOUNTER — HOSPITAL ENCOUNTER (OUTPATIENT)
Dept: OCCUPATIONAL THERAPY | Facility: HOSPITAL | Age: 62
Setting detail: THERAPIES SERIES
Discharge: HOME | End: 2022-10-17

## 2022-10-17 DIAGNOSIS — C50.812 MALIGNANT NEOPLASM OF OVERLAPPING SITES OF LEFT BREAST IN FEMALE, ESTROGEN RECEPTOR POSITIVE: ICD-10-CM

## 2022-10-17 DIAGNOSIS — I97.2 POST-MASTECTOMY LYMPHEDEMA SYNDROME: Primary | ICD-10-CM

## 2022-10-17 DIAGNOSIS — Z17.0 MALIGNANT NEOPLASM OF OVERLAPPING SITES OF LEFT BREAST IN FEMALE, ESTROGEN RECEPTOR POSITIVE: ICD-10-CM

## 2022-10-17 PROCEDURE — 97140 MANUAL THERAPY 1/> REGIONS: CPT

## 2022-10-17 PROCEDURE — 97535 SELF CARE MNGMENT TRAINING: CPT

## 2022-10-17 NOTE — THERAPY PROGRESS REPORT/RE-CERT
Outpatient Occupational Therapy Lymphedema Progress Note  [facility]     Patient Name: Marylu Georges  : 1960  MRN: [id number]  Today's Date: 10/17/2022      Visit Date: 10/17/2022    Patient Active Problem List   Diagnosis   • Malignant neoplasm of overlapping sites of left breast in female, estrogen receptor positive (HCC)   • Family history of breast cancer in female   • Syncope   • Malignant neoplasm of overlapping sites of both breasts in female, estrogen receptor positive (HCC)   • Hypertension   • Encounter for long-term (current) use of other medications   • Drug-induced hepatitis   • Atrial fibrillation (HCC)   • Transaminitis   • Lymphedema of upper extremity, bilateral   • Rash   • Inflammatory arthritis   • Streptococcal arthritis of left ankle (HCC)   • New onset atrial fibrillation (HCC)        Past Medical History:   Diagnosis Date   • Anemia in neoplastic disease    • Arthritis    • Arthritis of back    • Arthritis of neck    • Breast cancer (HCC)     Left   • CVA (cerebral vascular accident) (HCC)    • Fracture, fibula    • Fracture, finger    • Frozen shoulder    • Hip arthrosis    • Hip pain     RIGHT HIP... CYST   • History of fracture of leg    • History of radiation therapy     LAST TREATMENT     • Hypertension    • Knee swelling    • Limited joint range of motion (ROM)     RIGHT HIP   • Low back strain    • Periarthritis of shoulder    • Rotator cuff syndrome    • Skin sore     OPEN SORE LEFT BREAST   • Syncope    • Vertigo         Past Surgical History:   Procedure Laterality Date   • AXILLARY LYMPH NODE BIOPSY/EXCISION Right     LYMPH NODE UNDER RIGHT ARM-MALIGNANT (DOUBLE MASTECTOMY)   • BREAST BIOPSY Left     MALIGNANT   • FRACTURE SURGERY      Leg   • HARDWARE REMOVAL     • INCISION AND DRAINAGE LEG Left 2022    Procedure: INCISION AND DRAINAGE left ankle;  Surgeon: Kenneth Tran MD;  Location: [facility];  Service: Orthopedics;   Laterality: Left;   • JOINT REPLACEMENT  mar 2020,july 2021    hips   • MASTECTOMY W/ SENTINEL NODE BIOPSY Bilateral 09/16/2019    Procedure: BILATERLA MODIFIED RADICAL MASTECTOMY WITH BILATERAL SENTINEL LYMPH NODE BIOPSY;  Surgeon: Joby Barron Jr., MD;  Location: [facility] MAIN OR;  Service: General   • TOTAL HIP ARTHROPLASTY Right 03/02/2020    Procedure: RIGHT TOTAL HIP ARTHROPLASTY NATALY NAVIGATION;  Surgeon: Luis M Leonard MD;  Location: [facility] MAIN OR;  Service: Orthopedics;  Laterality: Right;   • TOTAL HIP ARTHROPLASTY Left 07/20/2021    Procedure: Posterior LEFT TOTAL HIP ARTHROPLASTY NATALY NAVIGATION;  Surgeon: Luis M Leonard MD;  Location: [facility] MAIN OR;  Service: Orthopedics;  Laterality: Left;   • VENOUS ACCESS DEVICE (PORT) INSERTION Right 02/01/2019    Procedure: INSERTION VENOUS ACCESS DEVICE;  Surgeon: Joby Barron Jr., MD;  Location: [facility];  Service: General   • VENOUS ACCESS DEVICE (PORT) REMOVAL N/A 10/30/2019    Procedure: Mediport Removal;  Surgeon: Joby Barron Jr., MD;  Location: [facility] MAIN OR;  Service: General         Visit Dx:      ICD-10-CM ICD-9-CM   1. 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 Malignant neoplasm of overlapping sites of left breast in female, estrogen receptor positive (HCC)  C50.812 174.8    Z17.0 V86.0        Lymphedema     Row Name 10/17/22 1500             Subjective Pain    Able to rate subjective pain? yes  -RE      Pre-Treatment Pain Level 0  -RE      Post-Treatment Pain Level 0  -RE         Subjective Comments    Subjective Comments no arm pain  -RE         BUE Circumferential (cm)    Measurement Location 1 axilla  -RE      Left 1 29.5 cm  -RE      Measurement Location 2 15 cm above elbow  -RE      Left 2 27 cm  -RE      Measurement Location 3 10 cm above elbow  -RE      Left 3 27.5 cm  -RE      Measurement Location 4 5 cm above elbow  -RE      Left 4 26.5 cm  -RE      Measurement Location 5 Elbow  -RE      Left 5 25.5 cm  -RE       Measurement Location 6 5 cm below elbow  -RE      Left 6 25 cm  -RE      Measurement Location 7 10 cm below elbow  -RE      Left 7 23.2 cm  -RE      Measurement Location 8 15 cm below elbow  -RE      Left 8 18.9 cm  -RE      Measurement Location 9 20 cm below elbow  -RE      Left 9 15.5 cm  -RE      Measurement Location 10 Wrist  -RE      Left 10 15.5 cm  -RE      Measurement Location 11 Mid palm  -RE      Left 11 19 cm  -RE      LUE Circumferential Total 253.1 cm  -RE         Manual Lymphatic Drainage    Manual Lymphatic Drainage initial sequence;opened regional lymph nodes;extremity treatment;opened anastamoses  -RE      Initial Sequence short neck;shoulder collectors  -RE      Shoulder Collectors right;left  -RE      Opened Regional Lymph Nodes inguinal  -RE      Inguinal left  -RE      Opened Anastamoses axillo-inguinal  -RE      Axillo-Inguinal left  -RE      Extremity Treatment MLD to full limb  -RE      MLD to Full Limb left  -RE         Compression/Skin Care    Compression/Skin Care skin care;wrapping location;bandaging  -RE      Skin Care lotion applied  -RE      Wrapping Location upper extremity  -RE      Wrapping Location UE left:;hand to axilla  -RE      Bandage Layers cotton liner;padding/fluff layer;short-stretch bandages (comment size/quantity)  -RE      Bandaging Comments Tg7, 1 1/2 Artifex, 1-6cm, 1-8cm and 1-10 cm Rosidal k  -RE      Bandaging Technique circumferential/spiral;moderate compression  -RE            User Key  (r) = Recorded By, (t) = Taken By, (c) = Cosigned By    Initials Name Provider Type    RE Shavonne Gonzalez OTR Occupational Therapist                         OT Assessment/Plan     Row Name 10/17/22 6270          OT Assessment    Impairments Edema;Impaired lymphatic circulation  -RE     Assessment Comments Goals updated see flow sheet. Edema is decreased in the left arm but persists. Added compression bandaging today to further reduce forearm swelling.  -RE     OT Diagnosis L  UE lymphedema  -RE     OT Rehab Potential Good  -RE     Patient/caregiver participated in establishment of treatment plan and goals Yes  -RE     Patient would benefit from skilled therapy intervention Yes  -RE        OT Plan    OT Frequency 3x/week  -RE     Predicted Duration of Therapy Intervention (OT) 2-4 weeks  -RE     Planned CPT's? OT THER PROC EA 15 MIN: [id number];OT SELF CARE/MGMT/TRAIN 15 MIN: 72980;OT MANUAL THERAPY EA 15 MIN: 62610;OT BIS XTRACELL FLUID ANALYSIS: 87417  -RE     OT Plan Comments continue  -RE           User Key  (r) = Recorded By, (t) = Taken By, (c) = Cosigned By    Initials Name Provider Type    Shavonne Douglas, PARAS Occupational Therapist                    Manual Rx (last 36 hours)     Manual Treatments     Row Name 10/17/22 1701             Total Minutes    51581 - OT Manual Therapy Minutes 45  -RE            User Key  (r) = Recorded By, (t) = Taken By, (c) = Cosigned By    Initials Name Provider Type    Shavonne Douglas, PARAS Occupational Therapist               OT Goals     Row Name 10/17/22 1600          OT Short Term Goals    STG Date to Achieve 10/31/22  -RE     STG 1 Patient will demonstrate proper awareness of “What is Lymphedema?” and “Healthy Habits” for improved prevention, management, care of symptoms and ease of transition to self-care of condition.  -RE     STG 1 Progress Met  -RE     STG 2 Patient independent and compliant with self-wrapping techniques of compression bandages with family member as indicated for improved self-management of condition.  -RE     STG 2 Progress Ongoing  -RE     STG 3 Patient will demonstrate decreased net edema of left upper extremity >/= 3-5 cm for decrease in symptoms, decreased risk of infection and improved skin care/transition to self-care of condition.  -RE     STG 3 Progress Met  -RE        Long Term Goals    LTG Date to Achieve 11/14/22  -RE     LTG 1 Patient will demonstrate decreased net edema of left upper extremity >/= 6-15 cm for  decrease in symptoms, decreased risk of infection and improved skin care/transition to self-care of condition.  -RE     LTG 1 Progress Ongoing  -RE     LTG 2 Patient independent and compliant with self-massage techniques with spouse/family member as needed for improved lymphatic drainage, decreased edema/symptoms, decreased risk of infection and improved transition to self-care of condition.  -RE     LTG 2 Progress Ongoing  -RE     LTG 3 Patient independent and compliant with initial home exercise program focused on diaphragmatic breathing, range of motion, flexibility to decrease edema and improve lymphatic flow for decreased edema and decreased risk of infection.  -RE     LTG 3 Progress Ongoing  -RE     LTG 4 Patient independent and compliant with compression garments and night compression as indicated for self-management of edema.  -RE     LTG 4 Progress Partially Met  -RE     LTG 5 Patient to improve DASH/ QuickDASH score by 10 points in 4 weeks.  -RE     LTG 5 Progress Ongoing  -RE        Time Calculation    OT Goal Re-Cert Due Date 12/15/22  -RE           User Key  (r) = Recorded By, (t) = Taken By, (c) = Cosigned By    Initials Name Provider Type    Shavonne Douglas OTR Occupational Therapist                Therapy Education  Education Details: bandage precautions  Given: Bandaging/dressing change  Program: New  How Provided: Verbal, Demonstration  Provided to: Patient  Level of Understanding: Teach back education performed, Verbalized  23213 - OT Self Care/Mgmt Minutes: 15                Time Calculation:   OT Start Time: 1545  OT Stop Time: 1645  OT Time Calculation (min): 60 min  Total Timed Code Minutes- OT: 60 minute(s)  Timed Charges  61260 - OT Manual Therapy Minutes: 45  88061 - OT Self Care/Mgmt Minutes: 15  Total Minutes  Timed Charges Total Minutes: 60   Total Minutes: 60     Therapy Charges for Today     Code Description Service Date Service Provider Modifiers Qty    [id number]  OT MANUAL  THERAPY EA 15 MIN 10/17/2022 Shavonne Gonzalez OTR GO 3    [id number] HC OT SELF CARE/MGMT/TRAIN EA 15 MIN 10/17/2022 Shavonne Gonzalez OTR GO 1                      PARAS Hackett  10/17/2022

## 2022-10-18 ENCOUNTER — HOSPITAL ENCOUNTER (OUTPATIENT)
Dept: OCCUPATIONAL THERAPY | Facility: HOSPITAL | Age: 62
Setting detail: THERAPIES SERIES
Discharge: HOME | End: 2022-10-18

## 2022-10-18 DIAGNOSIS — Z17.0 MALIGNANT NEOPLASM OF OVERLAPPING SITES OF LEFT BREAST IN FEMALE, ESTROGEN RECEPTOR POSITIVE: ICD-10-CM

## 2022-10-18 DIAGNOSIS — I97.2 POST-MASTECTOMY LYMPHEDEMA SYNDROME: Primary | ICD-10-CM

## 2022-10-18 DIAGNOSIS — C50.812 MALIGNANT NEOPLASM OF OVERLAPPING SITES OF LEFT BREAST IN FEMALE, ESTROGEN RECEPTOR POSITIVE: ICD-10-CM

## 2022-10-18 PROCEDURE — 97535 SELF CARE MNGMENT TRAINING: CPT

## 2022-10-18 PROCEDURE — 97140 MANUAL THERAPY 1/> REGIONS: CPT

## 2022-10-18 NOTE — THERAPY TREATMENT NOTE
Outpatient Occupational Therapy Lymphedema Treatment Note  [facility]     Patient Name: Marylu Georges  : 1960  MRN: [id number]  Today's Date: 10/18/2022      Visit Date: 10/18/2022    Patient Active Problem List   Diagnosis   • Malignant neoplasm of overlapping sites of left breast in female, estrogen receptor positive (HCC)   • Family history of breast cancer in female   • Syncope   • Malignant neoplasm of overlapping sites of both breasts in female, estrogen receptor positive (HCC)   • Hypertension   • Encounter for long-term (current) use of other medications   • Drug-induced hepatitis   • Atrial fibrillation (HCC)   • Transaminitis   • Lymphedema of upper extremity, bilateral   • Rash   • Inflammatory arthritis   • Streptococcal arthritis of left ankle (HCC)   • New onset atrial fibrillation (HCC)        Past Medical History:   Diagnosis Date   • Anemia in neoplastic disease    • Arthritis    • Arthritis of back    • Arthritis of neck    • Breast cancer (HCC)     Left   • CVA (cerebral vascular accident) (HCC)    • Fracture, fibula    • Fracture, finger    • Frozen shoulder    • Hip arthrosis    • Hip pain     RIGHT HIP... CYST   • History of fracture of leg    • History of radiation therapy     LAST TREATMENT     • Hypertension    • Knee swelling    • Limited joint range of motion (ROM)     RIGHT HIP   • Low back strain    • Periarthritis of shoulder    • Rotator cuff syndrome    • Skin sore     OPEN SORE LEFT BREAST   • Syncope    • Vertigo         Past Surgical History:   Procedure Laterality Date   • AXILLARY LYMPH NODE BIOPSY/EXCISION Right     LYMPH NODE UNDER RIGHT ARM-MALIGNANT (DOUBLE MASTECTOMY)   • BREAST BIOPSY Left     MALIGNANT   • FRACTURE SURGERY      Leg   • HARDWARE REMOVAL     • INCISION AND DRAINAGE LEG Left 2022    Procedure: INCISION AND DRAINAGE left ankle;  Surgeon: Kenneth Tran MD;  Location: [facility];  Service: Orthopedics;   Laterality: Left;   • JOINT REPLACEMENT  mar 2020,july 2021    hips   • MASTECTOMY W/ SENTINEL NODE BIOPSY Bilateral 09/16/2019    Procedure: BILATERLA MODIFIED RADICAL MASTECTOMY WITH BILATERAL SENTINEL LYMPH NODE BIOPSY;  Surgeon: Joby Barron Jr., MD;  Location: [facility] MAIN OR;  Service: General   • TOTAL HIP ARTHROPLASTY Right 03/02/2020    Procedure: RIGHT TOTAL HIP ARTHROPLASTY NATALY NAVIGATION;  Surgeon: Luis M Leonard MD;  Location: [facility] MAIN OR;  Service: Orthopedics;  Laterality: Right;   • TOTAL HIP ARTHROPLASTY Left 07/20/2021    Procedure: Posterior LEFT TOTAL HIP ARTHROPLASTY NATALY NAVIGATION;  Surgeon: Luis M Leonard MD;  Location: [facility] MAIN OR;  Service: Orthopedics;  Laterality: Left;   • VENOUS ACCESS DEVICE (PORT) INSERTION Right 02/01/2019    Procedure: INSERTION VENOUS ACCESS DEVICE;  Surgeon: Joby Barron Jr., MD;  Location: [facility];  Service: General   • VENOUS ACCESS DEVICE (PORT) REMOVAL N/A 10/30/2019    Procedure: Mediport Removal;  Surgeon: Joby Barron Jr., MD;  Location: [facility] MAIN OR;  Service: General         Visit Dx:      ICD-10-CM ICD-9-CM   1. 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 Malignant neoplasm of overlapping sites of left breast in female, estrogen receptor positive (HCC)  C50.812 174.8    Z17.0 V86.0        Lymphedema     Row Name 10/18/22 1400             Subjective Pain    Able to rate subjective pain? yes  -RE      Pre-Treatment Pain Level 0  -RE      Post-Treatment Pain Level 0  -RE         Subjective Comments    Subjective Comments was able to sleep inthe bandages without issue  -RE         Manual Lymphatic Drainage    Manual Lymphatic Drainage initial sequence;opened regional lymph nodes;extremity treatment;opened anastamoses  -RE      Initial Sequence short neck;shoulder collectors  -RE      Shoulder Collectors right;left  -RE      Opened Regional Lymph Nodes inguinal  -RE      Inguinal left  -RE      Opened Anastamoses  axillo-inguinal  -RE      Axillo-Inguinal left  -RE      Extremity Treatment MLD to full limb  -RE      MLD to Full Limb left  -RE         Compression/Skin Care    Compression/Skin Care skin care;wrapping location;bandaging  -RE      Skin Care lotion applied  -RE      Wrapping Location upper extremity  -RE      Wrapping Location UE left:;hand to axilla  -RE      Bandage Layers cotton liner;padding/fluff layer;short-stretch bandages (comment size/quantity)  -RE      Bandaging Comments Tg7, 1 1/2 Artifex, 1-6cm, 1-8cm and 1-10 cm Rosidal k. Komprex II along  ulna for fibrosis.  -RE      Bandaging Technique circumferential/spiral;moderate compression  -RE            User Key  (r) = Recorded By, (t) = Taken By, (c) = Cosigned By    Initials Name Provider Type    Shavonne Douglas OTR Occupational Therapist                         OT Assessment/Plan     Row Name 10/18/22 1558          OT Assessment    Assessment Comments Increased skin wrinkling when bandages removed indicating decreased edema. Fibrosis along ulna was not improved. Added Komprex II to address this. progressing well.  -RE        OT Plan    OT Plan Comments continue  -RE           User Key  (r) = Recorded By, (t) = Taken By, (c) = Cosigned By    Initials Name Provider Type    Shavonne Douglas OTR Occupational Therapist                    Manual Rx (last 36 hours)     Manual Treatments     Row Name 10/18/22 1600             Total Minutes    26962 - OT Manual Therapy Minutes 45  -RE            User Key  (r) = Recorded By, (t) = Taken By, (c) = Cosigned By    Initials Name Provider Type    Shavonne Douglas OTR Occupational Therapist                  Therapy Education  Given: Bandaging/dressing change  Program: Reinforced  How Provided: Verbal  Provided to: Patient  Level of Understanding: Teach back education performed, Verbalized  42790 - OT Self Care/Mgmt Minutes: 15                Time Calculation:   OT Start Time: 1445  OT Stop Time: 1545  OT Time  Calculation (min): 60 min  Total Timed Code Minutes- OT: 60 minute(s)  Timed Charges  23766 - OT Manual Therapy Minutes: 45  90969 - OT Self Care/Mgmt Minutes: 15  Total Minutes  Timed Charges Total Minutes: 60   Total Minutes: 60     Therapy Charges for Today     Code Description Service Date Service Provider Modifiers Qty    06582675174 HC OT MANUAL THERAPY EA 15 MIN 10/18/2022 Shavonne Gonzalez OTR GO 3    15377141215 HC OT SELF CARE/MGMT/TRAIN EA 15 MIN 10/18/2022 Shavonne Gonzalez OTR GO 1                      PARAS Hackett  10/18/2022

## 2022-10-20 ENCOUNTER — HOSPITAL ENCOUNTER (OUTPATIENT)
Dept: OCCUPATIONAL THERAPY | Facility: HOSPITAL | Age: 62
Setting detail: THERAPIES SERIES
Discharge: HOME | End: 2022-10-20

## 2022-10-20 DIAGNOSIS — I97.2 POST-MASTECTOMY LYMPHEDEMA SYNDROME: Primary | ICD-10-CM

## 2022-10-20 DIAGNOSIS — Z17.0 MALIGNANT NEOPLASM OF OVERLAPPING SITES OF LEFT BREAST IN FEMALE, ESTROGEN RECEPTOR POSITIVE: ICD-10-CM

## 2022-10-20 DIAGNOSIS — C50.812 MALIGNANT NEOPLASM OF OVERLAPPING SITES OF LEFT BREAST IN FEMALE, ESTROGEN RECEPTOR POSITIVE: ICD-10-CM

## 2022-10-20 PROCEDURE — 97535 SELF CARE MNGMENT TRAINING: CPT

## 2022-10-20 PROCEDURE — 97140 MANUAL THERAPY 1/> REGIONS: CPT

## 2022-10-20 NOTE — THERAPY TREATMENT NOTE
Outpatient Occupational Therapy Lymphedema Treatment Note  [facility]     Patient Name: Marylu Georges  : 1960  MRN: [id number]  Today's Date: 10/20/2022      Visit Date: 10/20/2022    Patient Active Problem List   Diagnosis   • Malignant neoplasm of overlapping sites of left breast in female, estrogen receptor positive (HCC)   • Family history of breast cancer in female   • Syncope   • Malignant neoplasm of overlapping sites of both breasts in female, estrogen receptor positive (HCC)   • Hypertension   • Encounter for long-term (current) use of other medications   • Drug-induced hepatitis   • Atrial fibrillation (HCC)   • Transaminitis   • Lymphedema of upper extremity, bilateral   • Rash   • Inflammatory arthritis   • Streptococcal arthritis of left ankle (HCC)   • New onset atrial fibrillation (HCC)        Past Medical History:   Diagnosis Date   • Anemia in neoplastic disease    • Arthritis    • Arthritis of back    • Arthritis of neck    • Breast cancer (HCC)     Left   • CVA (cerebral vascular accident) (HCC)    • Fracture, fibula    • Fracture, finger    • Frozen shoulder    • Hip arthrosis    • Hip pain     RIGHT HIP... CYST   • History of fracture of leg    • History of radiation therapy     LAST TREATMENT     • Hypertension    • Knee swelling    • Limited joint range of motion (ROM)     RIGHT HIP   • Low back strain    • Periarthritis of shoulder    • Rotator cuff syndrome    • Skin sore     OPEN SORE LEFT BREAST   • Syncope    • Vertigo         Past Surgical History:   Procedure Laterality Date   • AXILLARY LYMPH NODE BIOPSY/EXCISION Right     LYMPH NODE UNDER RIGHT ARM-MALIGNANT (DOUBLE MASTECTOMY)   • BREAST BIOPSY Left     MALIGNANT   • FRACTURE SURGERY      Leg   • HARDWARE REMOVAL     • INCISION AND DRAINAGE LEG Left 2022    Procedure: INCISION AND DRAINAGE left ankle;  Surgeon: Kenneth Tran MD;  Location: [facility];  Service: Orthopedics;   Laterality: Left;   • JOINT REPLACEMENT  mar 2020,july 2021    hips   • MASTECTOMY W/ SENTINEL NODE BIOPSY Bilateral 09/16/2019    Procedure: BILATERLA MODIFIED RADICAL MASTECTOMY WITH BILATERAL SENTINEL LYMPH NODE BIOPSY;  Surgeon: Joby Barron Jr., MD;  Location: [facility] MAIN OR;  Service: General   • TOTAL HIP ARTHROPLASTY Right 03/02/2020    Procedure: RIGHT TOTAL HIP ARTHROPLASTY NATALY NAVIGATION;  Surgeon: Luis M Leonard MD;  Location: [facility] MAIN OR;  Service: Orthopedics;  Laterality: Right;   • TOTAL HIP ARTHROPLASTY Left 07/20/2021    Procedure: Posterior LEFT TOTAL HIP ARTHROPLASTY NATALY NAVIGATION;  Surgeon: Luis M Leonard MD;  Location: [facility] MAIN OR;  Service: Orthopedics;  Laterality: Left;   • VENOUS ACCESS DEVICE (PORT) INSERTION Right 02/01/2019    Procedure: INSERTION VENOUS ACCESS DEVICE;  Surgeon: Joby Barron Jr., MD;  Location: [facility];  Service: General   • VENOUS ACCESS DEVICE (PORT) REMOVAL N/A 10/30/2019    Procedure: Mediport Removal;  Surgeon: Joby Barron Jr., MD;  Location: [facility] MAIN OR;  Service: General         Visit Dx:      ICD-10-CM ICD-9-CM   1. 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 Malignant neoplasm of overlapping sites of left breast in female, estrogen receptor positive (HCC)  C50.812 174.8    Z17.0 V86.0        Lymphedema     Row Name 10/20/22 1300             Subjective Pain    Able to rate subjective pain? yes  -RE      Pre-Treatment Pain Level 0  -RE      Post-Treatment Pain Level 0  -RE         BUE Circumferential (cm)    Measurement Location 1 axilla  -RE      Left 1 28 cm  -RE      Measurement Location 2 15 cm above elbow  -RE      Left 2 26.7 cm  -RE      Measurement Location 3 10 cm above elbow  -RE      Left 3 26.5 cm  -RE      Measurement Location 4 5 cm above elbow  -RE      Left 4 26 cm  -RE      Measurement Location 5 Elbow  -RE      Left 5 24.7 cm  -RE      Measurement Location 6 5 cm below elbow  -RE      Left 6 24.5 cm  " -RE      Measurement Location 7 10 cm below elbow  -RE      Left 7 23 cm  -RE      Measurement Location 8 15 cm below elbow  -RE      Left 8 19.3 cm  -RE      Measurement Location 9 20 cm below elbow  -RE      Left 9 15.5 cm  -RE      Measurement Location 10 Wrist  -RE      Left 10 15 cm  -RE      Measurement Location 11 Mid palm  -RE      Left 11 18.5 cm  -RE      LUE Circumferential Total 247.7 cm  -RE         Manual Lymphatic Drainage    Manual Lymphatic Drainage initial sequence;opened regional lymph nodes;extremity treatment;opened anastamoses  -RE      Initial Sequence short neck;shoulder collectors  -RE      Shoulder Collectors right;left  -RE      Opened Regional Lymph Nodes inguinal  -RE      Inguinal left  -RE      Opened Anastamoses axillo-inguinal  -RE      Axillo-Inguinal left  -RE      Extremity Treatment MLD to full limb  -RE         Compression/Skin Care    Compression/Skin Care skin care;wrapping location;bandaging  -RE      Skin Care lotion applied  -RE      Wrapping Location upper extremity  -RE      Wrapping Location UE left:;hand to axilla  -RE      Bandage Layers cotton liner;padding/fluff layer;short-stretch bandages (comment size/quantity)  -RE      Bandaging Comments Discussed lymphedema risk factors and \"Healty Habits\" handout and lymphedema hand out. Explained purpose of Bioimpedance testing including recommended frequency.  Instructed in adaptive UE dressing technique using a large, men's button up shirt. Instructed on adaptive techniques for perineal hygiene.  -RE      Bandaging Technique circumferential/spiral;moderate compression  -RE            User Key  (r) = Recorded By, (t) = Taken By, (c) = Cosigned By    Initials Name Provider Type    RE Shavonne Gonzalez OTR Occupational Therapist                         OT Assessment/Plan     Row Name 10/20/22 7345          OT Assessment    Assessment Comments Measurements indicate further decrease in edema. Komprex decreased edema and fibrosis " along ulna. Good progress.  -RE        OT Plan    OT Plan Comments continue  -RE           User Key  (r) = Recorded By, (t) = Taken By, (c) = Cosigned By    Initials Name Provider Type    Shavonne Douglas OTR Occupational Therapist                    Manual Rx (last 36 hours)     Manual Treatments     Row Name 10/20/22 1608             Total Minutes    80390 - OT Manual Therapy Minutes 40  -RE            User Key  (r) = Recorded By, (t) = Taken By, (c) = Cosigned By    Initials Name Provider Type    Shavonne Douglas OTR Occupational Therapist                  Therapy Education  Given: Bandaging/dressing change  Program: New  How Provided: Verbal, Demonstration, Written  Provided to: Patient  Level of Understanding: Teach back education performed, Verbalized  79043 - OT Self Care/Mgmt Minutes: 15                Time Calculation:   OT Start Time: 1335  OT Stop Time: 1430  OT Time Calculation (min): 55 min  Total Timed Code Minutes- OT: 55 minute(s)  Timed Charges  84614 - OT Manual Therapy Minutes: 40  32949 - OT Self Care/Mgmt Minutes: 15  Total Minutes  Timed Charges Total Minutes: 55   Total Minutes: 55     Therapy Charges for Today     Code Description Service Date Service Provider Modifiers Qty    08044727562 HC OT MANUAL THERAPY EA 15 MIN 10/20/2022 Shavonne Gonzalez OTR GO 3    74617681012 HC OT SELF CARE/MGMT/TRAIN EA 15 MIN 10/20/2022 Shavonne Gonzalez OTR GO 1                      PARAS Hackett  10/20/2022

## 2022-10-24 ENCOUNTER — HOSPITAL ENCOUNTER (OUTPATIENT)
Dept: OCCUPATIONAL THERAPY | Facility: HOSPITAL | Age: 62
Setting detail: THERAPIES SERIES
Discharge: HOME | End: 2022-10-24

## 2022-10-24 DIAGNOSIS — Z17.0 MALIGNANT NEOPLASM OF OVERLAPPING SITES OF LEFT BREAST IN FEMALE, ESTROGEN RECEPTOR POSITIVE: ICD-10-CM

## 2022-10-24 DIAGNOSIS — C50.812 MALIGNANT NEOPLASM OF OVERLAPPING SITES OF LEFT BREAST IN FEMALE, ESTROGEN RECEPTOR POSITIVE: ICD-10-CM

## 2022-10-24 DIAGNOSIS — I97.2 POST-MASTECTOMY LYMPHEDEMA SYNDROME: Primary | ICD-10-CM

## 2022-10-24 PROCEDURE — 97535 SELF CARE MNGMENT TRAINING: CPT

## 2022-10-24 PROCEDURE — 97140 MANUAL THERAPY 1/> REGIONS: CPT

## 2022-10-24 NOTE — THERAPY TREATMENT NOTE
Outpatient Occupational Therapy Lymphedema Treatment Note  [facility]     Patient Name: Marylu Georges  : 1960  MRN: [id number]  Today's Date: 10/24/2022      Visit Date: 10/24/2022    Patient Active Problem List   Diagnosis   • Malignant neoplasm of overlapping sites of left breast in female, estrogen receptor positive (HCC)   • Family history of breast cancer in female   • Syncope   • Malignant neoplasm of overlapping sites of both breasts in female, estrogen receptor positive (HCC)   • Hypertension   • Encounter for long-term (current) use of other medications   • Drug-induced hepatitis   • Atrial fibrillation (HCC)   • Transaminitis   • Lymphedema of upper extremity, bilateral   • Rash   • Inflammatory arthritis   • Streptococcal arthritis of left ankle (HCC)   • New onset atrial fibrillation (HCC)        Past Medical History:   Diagnosis Date   • Anemia in neoplastic disease    • Arthritis    • Arthritis of back    • Arthritis of neck    • Breast cancer (HCC)     Left   • CVA (cerebral vascular accident) (HCC)    • Fracture, fibula    • Fracture, finger    • Frozen shoulder    • Hip arthrosis    • Hip pain     RIGHT HIP... CYST   • History of fracture of leg    • History of radiation therapy     LAST TREATMENT     • Hypertension    • Knee swelling    • Limited joint range of motion (ROM)     RIGHT HIP   • Low back strain    • Periarthritis of shoulder    • Rotator cuff syndrome    • Skin sore     OPEN SORE LEFT BREAST   • Syncope    • Vertigo         Past Surgical History:   Procedure Laterality Date   • AXILLARY LYMPH NODE BIOPSY/EXCISION Right     LYMPH NODE UNDER RIGHT ARM-MALIGNANT (DOUBLE MASTECTOMY)   • BREAST BIOPSY Left     MALIGNANT   • FRACTURE SURGERY      Leg   • HARDWARE REMOVAL     • INCISION AND DRAINAGE LEG Left 2022    Procedure: INCISION AND DRAINAGE left ankle;  Surgeon: Kenneth Tran MD;  Location: [facility];  Service: Orthopedics;   Laterality: Left;   • JOINT REPLACEMENT  mar 2020,july 2021    hips   • MASTECTOMY W/ SENTINEL NODE BIOPSY Bilateral 09/16/2019    Procedure: BILATERLA MODIFIED RADICAL MASTECTOMY WITH BILATERAL SENTINEL LYMPH NODE BIOPSY;  Surgeon: Joby Barron Jr., MD;  Location: [facility] MAIN OR;  Service: General   • TOTAL HIP ARTHROPLASTY Right 03/02/2020    Procedure: RIGHT TOTAL HIP ARTHROPLASTY NATALY NAVIGATION;  Surgeon: Luis M Leonard MD;  Location: [facility] MAIN OR;  Service: Orthopedics;  Laterality: Right;   • TOTAL HIP ARTHROPLASTY Left 07/20/2021    Procedure: Posterior LEFT TOTAL HIP ARTHROPLASTY NATALY NAVIGATION;  Surgeon: Luis M Leonard MD;  Location: [facility] MAIN OR;  Service: Orthopedics;  Laterality: Left;   • VENOUS ACCESS DEVICE (PORT) INSERTION Right 02/01/2019    Procedure: INSERTION VENOUS ACCESS DEVICE;  Surgeon: Joby Barron Jr., MD;  Location: [facility];  Service: General   • VENOUS ACCESS DEVICE (PORT) REMOVAL N/A 10/30/2019    Procedure: Mediport Removal;  Surgeon: Joby Barron Jr., MD;  Location: [facility] MAIN OR;  Service: General         Visit Dx:      ICD-10-CM ICD-9-CM   1. 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 Malignant neoplasm of overlapping sites of left breast in female, estrogen receptor positive (HCC)  C50.812 174.8    Z17.0 V86.0        Lymphedema     Row Name 10/24/22 1500             Subjective Pain    Able to rate subjective pain? yes  -RE      Pre-Treatment Pain Level 0  -RE      Post-Treatment Pain Level 0  -RE         Subjective Comments    Subjective Comments Had a few problems with self bandaging.  -RE         Manual Lymphatic Drainage    Manual Lymphatic Drainage initial sequence;opened regional lymph nodes;extremity treatment;opened anastamoses  -RE      Initial Sequence short neck;shoulder collectors  -RE      Shoulder Collectors right;left  -RE      Opened Regional Lymph Nodes inguinal  -RE      Inguinal left  -RE      Opened Anastamoses  axillo-inguinal  -RE      Axillo-Inguinal left  -RE      Extremity Treatment MLD to full limb  -RE      MLD to Full Limb left  -RE         Compression/Skin Care    Compression/Skin Care skin care;wrapping location;bandaging  -RE      Skin Care lotion applied  -RE      Wrapping Location upper extremity  -RE      Wrapping Location UE left:;hand to axilla  -RE      Bandage Layers cotton liner;padding/fluff layer;short-stretch bandages (comment size/quantity)  -RE      Bandaging Comments Tg7, 1 1/2 Artifex, 1-6cm, 1-8cm and 1-10 cm Rosidal k. Komprex II along  ulna for fibrosis.  -RE      Bandaging Technique circumferential/spiral;moderate compression  -RE            User Key  (r) = Recorded By, (t) = Taken By, (c) = Cosigned By    Initials Name Provider Type    Shavonne Douglas OTR Occupational Therapist                         OT Assessment/Plan     Row Name 10/24/22 1508          OT Assessment    Assessment Comments Good technique with self bandaging. Patient needed vebal cues. Minimal palpable edema today. Fibrosis persists along ulna with mild edema there as well.  -RE        OT Plan    OT Plan Comments continue  -RE           User Key  (r) = Recorded By, (t) = Taken By, (c) = Cosigned By    Initials Name Provider Type    Shavonne Douglas OTR Occupational Therapist                    Manual Rx (last 36 hours)     Manual Treatments     Row Name 10/24/22 1510             Total Minutes    05239 - OT Manual Therapy Minutes 30  -RE            User Key  (r) = Recorded By, (t) = Taken By, (c) = Cosigned By    Initials Name Provider Type    Shavonne Douglas OTR Occupational Therapist                  Therapy Education  Education Details: Patient practiced bandaging.  Given: Bandaging/dressing change  Program: Reinforced  How Provided: Verbal, Demonstration  Provided to: Patient  Level of Understanding: Teach back education performed, Verbalized, Demonstrated  41957 - OT Self Care/Mgmt Minutes: 15                Time  Calculation:   OT Start Time: 1405  OT Stop Time: 1450  OT Time Calculation (min): 45 min  Total Timed Code Minutes- OT: 45 minute(s)  Timed Charges  03927 - OT Manual Therapy Minutes: 30  44478 - OT Self Care/Mgmt Minutes: 15  Total Minutes  Timed Charges Total Minutes: 45   Total Minutes: 45     Therapy Charges for Today     Code Description Service Date Service Provider Modifiers Qty    86073692249 HC OT MANUAL THERAPY EA 15 MIN 10/24/2022 Shavonne Gonzalez OTR GO 2    59354007962 HC OT SELF CARE/MGMT/TRAIN EA 15 MIN 10/24/2022 Shavonne Gonzalez OTR GO 1                      PARAS Hackett  10/24/2022

## 2022-10-25 ENCOUNTER — LAB (OUTPATIENT)
Dept: LAB | Facility: HOSPITAL | Age: 62
End: 2022-10-25

## 2022-10-25 ENCOUNTER — OFFICE VISIT (OUTPATIENT)
Dept: ONCOLOGY | Facility: CLINIC | Age: 62
End: 2022-10-25

## 2022-10-25 ENCOUNTER — TELEPHONE (OUTPATIENT)
Dept: ONCOLOGY | Facility: CLINIC | Age: 62
End: 2022-10-25

## 2022-10-25 VITALS
RESPIRATION RATE: 16 BRPM | HEIGHT: 66 IN | HEART RATE: 64 BPM | SYSTOLIC BLOOD PRESSURE: 138 MMHG | TEMPERATURE: 97.5 F | DIASTOLIC BLOOD PRESSURE: 87 MMHG | WEIGHT: 153.6 LBS | OXYGEN SATURATION: 99 % | BODY MASS INDEX: 24.68 KG/M2

## 2022-10-25 DIAGNOSIS — Z79.899 ENCOUNTER FOR LONG-TERM (CURRENT) USE OF OTHER MEDICATIONS: ICD-10-CM

## 2022-10-25 DIAGNOSIS — C50.812 MALIGNANT NEOPLASM OF OVERLAPPING SITES OF BOTH BREASTS IN FEMALE, ESTROGEN RECEPTOR POSITIVE: ICD-10-CM

## 2022-10-25 DIAGNOSIS — I10 PRIMARY HYPERTENSION: ICD-10-CM

## 2022-10-25 DIAGNOSIS — C50.812 MALIGNANT NEOPLASM OF OVERLAPPING SITES OF LEFT BREAST IN FEMALE, ESTROGEN RECEPTOR POSITIVE: Primary | ICD-10-CM

## 2022-10-25 DIAGNOSIS — M00.272 STREPTOCOCCAL ARTHRITIS OF LEFT ANKLE: ICD-10-CM

## 2022-10-25 DIAGNOSIS — T50.905A DRUG-INDUCED HEPATITIS: ICD-10-CM

## 2022-10-25 DIAGNOSIS — C50.812 MALIGNANT NEOPLASM OF OVERLAPPING SITES OF LEFT BREAST IN FEMALE, ESTROGEN RECEPTOR POSITIVE: ICD-10-CM

## 2022-10-25 DIAGNOSIS — C50.811 MALIGNANT NEOPLASM OF OVERLAPPING SITES OF BOTH BREASTS IN FEMALE, ESTROGEN RECEPTOR POSITIVE: ICD-10-CM

## 2022-10-25 DIAGNOSIS — Z17.0 MALIGNANT NEOPLASM OF OVERLAPPING SITES OF LEFT BREAST IN FEMALE, ESTROGEN RECEPTOR POSITIVE: Primary | ICD-10-CM

## 2022-10-25 DIAGNOSIS — I89.0 LYMPHEDEMA OF UPPER EXTREMITY, BILATERAL: ICD-10-CM

## 2022-10-25 DIAGNOSIS — K71.6 DRUG-INDUCED HEPATITIS: ICD-10-CM

## 2022-10-25 DIAGNOSIS — R74.8 ELEVATED LIVER ENZYMES: ICD-10-CM

## 2022-10-25 DIAGNOSIS — M19.90 INFLAMMATORY ARTHRITIS: ICD-10-CM

## 2022-10-25 DIAGNOSIS — I48.0 PAROXYSMAL ATRIAL FIBRILLATION: ICD-10-CM

## 2022-10-25 DIAGNOSIS — Z17.0 MALIGNANT NEOPLASM OF OVERLAPPING SITES OF BOTH BREASTS IN FEMALE, ESTROGEN RECEPTOR POSITIVE: ICD-10-CM

## 2022-10-25 DIAGNOSIS — Z17.0 MALIGNANT NEOPLASM OF OVERLAPPING SITES OF LEFT BREAST IN FEMALE, ESTROGEN RECEPTOR POSITIVE: ICD-10-CM

## 2022-10-25 DIAGNOSIS — F51.01 PRIMARY INSOMNIA: ICD-10-CM

## 2022-10-25 LAB
ALBUMIN SERPL-MCNC: 4.6 G/DL (ref 3.5–5.2)
ALBUMIN/GLOB SERPL: 1.8 G/DL (ref 1.1–2.4)
ALP SERPL-CCNC: 99 U/L (ref 38–116)
ALT SERPL W P-5'-P-CCNC: 15 U/L (ref 0–33)
ANION GAP SERPL CALCULATED.3IONS-SCNC: 11.3 MMOL/L (ref 5–15)
AST SERPL-CCNC: 21 U/L (ref 0–32)
BASOPHILS # BLD AUTO: 0.03 10*3/MM3 (ref 0–0.2)
BASOPHILS NFR BLD AUTO: 0.7 % (ref 0–1.5)
BILIRUB SERPL-MCNC: 0.3 MG/DL (ref 0.2–1.2)
BUN SERPL-MCNC: 22 MG/DL (ref 6–20)
BUN/CREAT SERPL: 26.8 (ref 7.3–30)
CALCIUM SPEC-SCNC: 10 MG/DL (ref 8.5–10.2)
CANCER AG15-3 SERPL-ACNC: 13.6 U/ML
CHLORIDE SERPL-SCNC: 105 MMOL/L (ref 98–107)
CO2 SERPL-SCNC: 25.7 MMOL/L (ref 22–29)
CREAT SERPL-MCNC: 0.82 MG/DL (ref 0.6–1.1)
DEPRECATED RDW RBC AUTO: 51.1 FL (ref 37–54)
EGFRCR SERPLBLD CKD-EPI 2021: 80 ML/MIN/1.73
EOSINOPHIL # BLD AUTO: 0.11 10*3/MM3 (ref 0–0.4)
EOSINOPHIL NFR BLD AUTO: 2.5 % (ref 0.3–6.2)
ERYTHROCYTE [DISTWIDTH] IN BLOOD BY AUTOMATED COUNT: 14.7 % (ref 12.3–15.4)
ERYTHROCYTE [SEDIMENTATION RATE] IN BLOOD: 2 MM/HR (ref 0–30)
GLOBULIN UR ELPH-MCNC: 2.5 GM/DL (ref 1.8–3.5)
GLUCOSE SERPL-MCNC: 97 MG/DL (ref 74–124)
HCT VFR BLD AUTO: 43.6 % (ref 34–46.6)
HGB BLD-MCNC: 13.7 G/DL (ref 12–15.9)
IMM GRANULOCYTES # BLD AUTO: 0.01 10*3/MM3 (ref 0–0.05)
IMM GRANULOCYTES NFR BLD AUTO: 0.2 % (ref 0–0.5)
LYMPHOCYTES # BLD AUTO: 0.9 10*3/MM3 (ref 0.7–3.1)
LYMPHOCYTES NFR BLD AUTO: 20.7 % (ref 19.6–45.3)
MCH RBC QN AUTO: 29.3 PG (ref 26.6–33)
MCHC RBC AUTO-ENTMCNC: 31.4 G/DL (ref 31.5–35.7)
MCV RBC AUTO: 93.4 FL (ref 79–97)
MONOCYTES # BLD AUTO: 0.28 10*3/MM3 (ref 0.1–0.9)
MONOCYTES NFR BLD AUTO: 6.4 % (ref 5–12)
NEUTROPHILS NFR BLD AUTO: 3.02 10*3/MM3 (ref 1.7–7)
NEUTROPHILS NFR BLD AUTO: 69.5 % (ref 42.7–76)
NRBC BLD AUTO-RTO: 0 /100 WBC (ref 0–0.2)
PLATELET # BLD AUTO: 172 10*3/MM3 (ref 140–450)
PMV BLD AUTO: 8.1 FL (ref 6–12)
POTASSIUM SERPL-SCNC: 4.2 MMOL/L (ref 3.5–4.7)
PROT SERPL-MCNC: 7.1 G/DL (ref 6.3–8)
RBC # BLD AUTO: 4.67 10*6/MM3 (ref 3.77–5.28)
SODIUM SERPL-SCNC: 142 MMOL/L (ref 134–145)
WBC NRBC COR # BLD: 4.35 10*3/MM3 (ref 3.4–10.8)

## 2022-10-25 PROCEDURE — 36415 COLL VENOUS BLD VENIPUNCTURE: CPT

## 2022-10-25 PROCEDURE — 99214 OFFICE O/P EST MOD 30 MIN: CPT | Performed by: INTERNAL MEDICINE

## 2022-10-25 PROCEDURE — 85025 COMPLETE CBC W/AUTO DIFF WBC: CPT

## 2022-10-25 PROCEDURE — 86300 IMMUNOASSAY TUMOR CA 15-3: CPT | Performed by: INTERNAL MEDICINE

## 2022-10-25 PROCEDURE — 85652 RBC SED RATE AUTOMATED: CPT | Performed by: INTERNAL MEDICINE

## 2022-10-25 PROCEDURE — 80053 COMPREHEN METABOLIC PANEL: CPT

## 2022-10-25 RX ORDER — QUETIAPINE FUMARATE 25 MG/1
25 TABLET, FILM COATED ORAL NIGHTLY
Qty: 30 TABLET | Refills: 2 | Status: SHIPPED | OUTPATIENT
Start: 2022-10-25 | End: 2022-12-06 | Stop reason: ALTCHOICE

## 2022-10-25 NOTE — PROGRESS NOTES
Subjective     REASON FOR FOLLOW UP:  1. 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 R BREAST MASS WITH GIANT R AXILLARY ADENOPATHY.  SHE UNDERWENT DOSE DENSE AC, TAXOL, BILATERAL MASTECTOMIES, DRAMATIC NEAR COMPLETE WV, RADIATION THERAPY AND ADJUVANT FEMARA STOPPED ON 12/21 BECAUSE DRUG INDUCED HEPATITIS, SWITCHED TO AROMASIN 2/22: NO SIDE EFFECTS.    3. FAMILY HISTORY OF BREAST CANCER IN MOTHER AND SISTER, SISTER WAS TESTED FOR BRCA AND WAS NEGATIVE.    4. LEFT OVARIAN CYST , IT HAS BEEN PRESENT FOR LONG TIME BUT IS GETTING LARGER, ASYMPTOMATIC, NEED FOR , PELVIC US AND GYN ONC EVal: no need for any intervention                  5. LEFT HIP PAIN SEVERE ARTHRITIS, REAL NEED FOR LEFT HIP REPLACEMENT: PERFORMED 8/21    6. DRUG INDUCED HEPATITIS,  FINALLY STOPPED FEMARA: DRAMATIC IMPROVEMENT AND RESOLUTION.      On 10/25/2022 I had the opportunity to see this 62-year-old white female who is undergoing aromatase inhibitor as a form of adjuvant therapy for her previous history of high risk breast cancer. The patient also has had multiple other comorbidities including psoriatic arthritis recently diagnosed and treated in multiple joints and also history of hypertension and atrial fibrillation on chronic anticoagulation and followed up by Cardiology. She also has osteoarthritis in multiple areas. Overall she continues going well. When she wakes up in the morning she has achiness throughout the joints but once that she moves and takes a Tylenol things improve to the point that mobilization becomes almost normal. She has not taken any antiinflammatory medications because of blood pressure. She also has lymphedema in the left upper extremity that has been treated in the lymphedema clinic. She has a sleeve now and she knows how to proceed in regard to the care of the skin in the left upper extremity as well. The patient's appetite remains excellent. Her bowel activity and urination  remain normal. She has no sensations of palpitations or chest pain, no shortness of breath, no cough, no sputum production. No fever, no chills, no sweats. Mentally she has no issues. She has a migraine related to allergies and she continues insisting on taking Sudafed and I continue insisting for her to discontinue this medicine to minimize issues pertinent to blood pressure. Otherwise the patient has not had any new health issues. 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 CYST   • History of fracture of leg 1987   • History of radiation therapy     LAST TREATMENT     • Hypertension    • Knee swelling    • Limited joint range of motion (ROM)     RIGHT HIP   • Low back strain    • Periarthritis of shoulder    • Rotator cuff syndrome 6/22   • Skin sore     OPEN SORE LEFT BREAST   • Syncope    • Vertigo            Past Surgical History:   Procedure Laterality Date   • AXILLARY LYMPH NODE BIOPSY/EXCISION Right     LYMPH NODE UNDER RIGHT ARM-MALIGNANT (DOUBLE MASTECTOMY)   • BREAST BIOPSY Left     MALIGNANT   • FRACTURE SURGERY  1987    Leg   • HARDWARE REMOVAL     • INCISION AND DRAINAGE LEG Left 06/30/2022    Procedure: INCISION AND DRAINAGE left ankle;  Surgeon: Kenneth Tran MD;  Location: [facility];  Service: Orthopedics;  Laterality: Left;   • JOINT REPLACEMENT  mar 2020,july 2021    hips   • MASTECTOMY W/ SENTINEL NODE BIOPSY Bilateral 09/16/2019    Procedure: BILATERLA MODIFIED RADICAL MASTECTOMY WITH BILATERAL SENTINEL LYMPH NODE BIOPSY;  Surgeon:  "Joby Barron Jr., MD;  Location: [facility] MAIN OR;  Service: General   • TOTAL HIP ARTHROPLASTY Right 03/02/2020    Procedure: RIGHT TOTAL HIP ARTHROPLASTY NATALY NAVIGATION;  Surgeon: Luis M Leonard MD;  Location: [facility] MAIN OR;  Service: Orthopedics;  Laterality: Right;   • TOTAL HIP ARTHROPLASTY Left 07/20/2021    Procedure: Posterior LEFT TOTAL HIP ARTHROPLASTY NATALY NAVIGATION;  Surgeon: Luis M Leonard MD;  Location: [facility] MAIN OR;  Service: Orthopedics;  Laterality: Left;   • VENOUS ACCESS DEVICE (PORT) INSERTION Right 02/01/2019    Procedure: INSERTION VENOUS ACCESS DEVICE;  Surgeon: Joby Barron Jr., MD;  Location: [facility] OR OSC;  Service: General   • VENOUS ACCESS DEVICE (PORT) REMOVAL N/A 10/30/2019    Procedure: Mediport Removal;  Surgeon: Joby Barron Jr., MD;  Location: [facility] OR;  Service: General        Current Outpatient Medications on File Prior to Visit   Medication Sig Dispense Refill   • acetaminophen (TYLENOL) 325 MG tablet Take 650 mg by mouth Every 6 (Six) Hours As Needed.     • apixaban (ELIQUIS) 5 MG tablet tablet Take 1 tablet by mouth Every 12 (Twelve) Hours. Indications: Atrial Fibrillation 60 tablet    • Cholecalciferol 250 MCG (25321 UT) tablet Take 1 tablet by mouth. Patient stated dose as \"1500 mg\"     • Diclofenac Sodium (VOLTAREN) 1 % gel gel Apply 4 g topically to the appropriate area as directed 4 (Four) Times a Day As Needed. RARE PRN     • diphenhydrAMINE (BENADRYL) 50 MG capsule Take 50 mg by mouth As Needed.     • exemestane (AROMASIN) 25 MG chemo tablet TAKE ONE TABLET BY MOUTH DAILY 30 tablet 3   • famotidine (PEPCID) 20 MG tablet Take 1 tablet by mouth 2 (Two) Times a Day Before Meals.     • metoprolol succinate XL (TOPROL-XL) 50 MG 24 hr tablet Take 1 tablet by mouth 2 (Two) Times a Day for 180 days. 60 tablet 5   • polyethylene glycol (polyethylene glycol) 17 g packet Take 17 g by mouth 2 (Two) Times a Day.     • sodium chloride 0.65 % nasal spray   1 " "Spray, Nasal, Drops, Q4H, PRN Nasal Congestion, 0 Refill(s)     • tiZANidine (ZANAFLEX) 4 MG tablet Take 1 tablet by mouth Every 6 (Six) Hours As Needed for Muscle Spasms.     • zolpidem (AMBIEN) 5 MG tablet Take 1 tablet by mouth At Night As Needed for Sleep. 30 tablet 1   • [DISCONTINUED] digoxin (LANOXIN) 125 MCG tablet Take 1 tablet by mouth Daily.       Current Facility-Administered Medications on File Prior to Visit   Medication Dose Route Frequency Provider Last Rate Last Admin   • Chlorhexidine Gluconate Cloth 2 % pads 1 each  1 each Apply externally Take As Directed Luis M Leonard MD            ALLERGIES:    Allergies   Allergen Reactions   • Benadryl [Diphenhydramine] Itching     INCREASES BLOOD PRESSURE   • Erythromycin GI Intolerance   • Levaquin [Levofloxacin] GI Intolerance   • Penicillins GI Intolerance        Social History     Socioeconomic History   • Marital status:      Spouse name: Cruz   • Number of children: 0   Tobacco Use   • Smoking status: Never   • Smokeless tobacco: Never   Vaping Use   • Vaping Use: Never used   Substance and Sexual Activity   • Alcohol use: Yes     Comment: 2 CUPS DAILY   • Drug use: No   • Sexual activity: Not Currently     Partners: Male     Birth control/protection: Abstinence        Family History   Problem Relation Age of Onset   • Lung cancer Father    • Irritable bowel syndrome Father    • Cancer Mother    • Breast cancer Mother    • Liver disease Mother    • Breast cancer Sister 48   • Breast cancer Maternal Grandmother    • Breast cancer Paternal Grandmother    • Breast cancer Maternal Uncle    • Malig Hyperthermia Neg Hx             Objective     Vitals:    10/25/22 0847   BP: 138/87   Pulse: 64   Resp: 16   Temp: 97.5 °F (36.4 °C)   TempSrc: Temporal   SpO2: 99%   Weight: 69.7 kg (153 lb 9.6 oz)   Height: 167.6 cm (65.98\")   PainSc: 0-No pain     Current Status 10/25/2022   ECOG score 0     Exam:               GENERAL:  Well-developed, " well-nourished  Patient  in no acute distress.   SKIN:  Warm, dry ,NO purpura ,no rash.  HEENT:  Pupils were equal and reactive to light and accomodation, conjunctivae noninjected, normal extraocular movements, normal visual acuity.   NECK:  Supple with good range of motion; no thyromegaly , no JVD or bruits,.No carotid artery pain, no carotid abnormal pulsation   LYMPHATICS:  No cervical, NO supraclavicular, NO axillary, NO inguinal adenopathies.  CARDIAC   normal rate , regular rhythm, without murmur,NO rubs NO S3 NO S4   LUNGS: normal breath sounds bilateral, no wheezing, NO rhonchi, NO crackles ,NO rubs.  VASCULAR VENOUS: no cyanosis, NO collateral circulation, NO varicosities, NO edema, NO palpable cords, NO pain,NO erythema, NO pigmentation of the skin.  ABDOMEN:  Soft, NO pain,no hepatomegaly, no splenomegaly,no masses, no ascites, no collateral circulation,no distention.  EXTREMITIES  AND SPINE:  No clubbing, no cyanosis ,OA HANDS deformities , no pain .No kyphosis,  no pain in spine, no pain in ribs , no pain in pelvic bone.  NEUROLOGICAL:  Patient was awake, alert, oriented to time, person and place.  Careful methodic inspection and palpation of the chest wall shows bilateral mastectomies, areas of telangectasia's in previous sites of radiation therapy. There are no masses or nodularities. There is no axillary adenopathy. She has grade 1 lymphedema in the left upper extremity, sleeve in place.                 .            RECENT LABS:  Hematology WBC   Date Value Ref Range Status   10/25/2022 4.35 3.40 - 10.80 10*3/mm3 Final     RBC   Date Value Ref Range Status   10/25/2022 4.67 3.77 - 5.28 10*6/mm3 Final     Hemoglobin   Date Value Ref Range Status   10/25/2022 13.7 12.0 - 15.9 g/dL Final     Hematocrit   Date Value Ref Range Status   10/25/2022 43.6 34.0 - 46.6 % Final     Platelets   Date Value Ref Range Status   10/25/2022 172 140 - 450 10*3/mm3 Final                      Assessment & Plan    1.   History of least for the last 4 years, she has had an in crescendo mass in the left breast that has produced a gigantic tumor that WAS close to 25 cm in size and is replacing most of the anatomy of the left breast. There are areas of ulceration necrosis and bleeding and the mass is fixed to the chest wall. She has abundant amount of collateral circulation in the left anterior chest wall and it caught my attention the lack of any left axillary adenopathy. She has no lymphedema in the left upper extremity. In the right breast while in sitting position, no palpable abnormalities are documented. The right breast skin and nipple are normal. When the patient lies flat, there is a palpable mass at 9 o'clock from the nipple that measures probably 4 cm in size, very indistinct in regard to edges and margins. There was no mobility and no areas of tenderness. She has a giant adenopathy in the right axilla that measures close to 9 cm in size, uniform, no fluctuation, nontender, completely fixed to the axillary wall. There is no compromise of the skin.     After completion of 4 cycles of AC patient has had very dramatic improvement in all sites of disease including right axilla and left breast.     Completed 4 cycles Adriamycin Cytoxan on 4/12/2019.     Patient started weekly Taxol treatments on 5/3/2019.   Completed 12 weekly Taxol treatments on 7/19/2019.   9/16/2019 bilateral mastectomy by Dr. Joby Barron with axillary lymph node dissection.  No residual malignancy.   10/30/2019 patient's Mediport was removed in anticipation of radiation.   Radiation therapy under the care of Dr. Marmolejo 10/31/2019-1/13/2020 to the right chest wall.   Started on adjuvant Femara 2.5 mg daily 8/20/2019.   9/21/2020 continues on adjuvant Femara, tolerating it quite well.  Some mild hot flashes and mild arthralgias, all which are tolerable.  • I advised the patient that at this point her breast cancer remains in remission. She is 100% compliant  of her medication letrozole and her clinical examination remains negative normal for breast cancer recurrence.   • The patient was further reviewed on 04/16/2021. Her clinical examination is completely negative normal and her questioning is negative in regard to symptoms that would suggest breast cancer recurrence. She is 100% compliant with her Femara. Her white count, hemoglobin and platelets remain normal. Her tumor marker during the previous visit was normal and we have another one pending today CA 15-3.   • The patient was further reviewed on 05/17/2021 along with her brother. The clinical examination has not changed. The only new complaint is the progressive amount of discomfort and the inability to function given the pain in the left hip area. Now she is limping. The only time when she is not in discomfort with the left hip is when she is sitting or lying in bed or riding the horse when gravity takes away the pressure of her left hip area. Radiologically speaking the CT scan of the chest, abdomen and pelvis to my eyes disclosed no abnormalities besides a very simple ovarian cyst that measures close to 7 x 5 cm with no trabeculation. There is no pelvic ascites. There is no pelvic adenopathy. The other abnormality obviously visible is the severe degree of scoliosis of the thoracic, lumbar spines.     It caught my attention the subchondral cyst in the left hip and the minimal if any space leftover between the head of the femur and the acetabulum. There is no metastasis in this anatomical site.     The radiologist mentioned cardiophrenic angle lymphadenopathy. I cannot see that from my naked eyes. I do not know what it is and I do not know how to express it. Pulmonary anatomy is normal. Hilar adenopathy is normal. No pericardial effusion. No pleural effusion. Minimal infiltrate in the lungs related to radiation changes. Liver anatomy, pancreas and kidneys were unremarkable. The scoliosis is very obvious in the  view of the abdomen.     Her CA 15-3 was minimal elevated in comparison to previous assessment and it raises the following question.   1. Is the cardiophrenic node described by the radiologist indeed significant of breast cancer recurrence or not? The only way to know will be to do a PET scan. This will be scheduled.   2. She is known for having an ovarian cyst for a long time but now is getting bigger, 7 cm across, and has no TABICATION with no pelvic ascites. I do not believe that this is representation of malignancy; nevertheless, I am going to run a CA-125 on her and I will proceed with a pelvic ultrasound as well as a GYN oncology referral for review.   3. I will send a notification to Dr. Leonard, the patient’s orthopedic surgeon, in regard to all her issues pertinent to the left hip. I think the patient is getting closer and closer to having a left hip replacement. 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 She recovered extremely well from her right hip surgery before.     The patient returned to the office on 05/24/2021. Since the previous visit the patient proceeded with a PET scan and I have reviewed this with her in the PAC system showing chest wall on the left side area of enlightening SUV activity that is almost 3 cm long x 7 mm wide. It is behind the bone. It is very close to the lower aspect of her heart. The reset of the PET scan discloses no activity. This is also specific in regard to the ovaries and actually an ultrasound of her vagina looking into the ovaries documented an unilocular cyst on the left side with typical features of benignity and a  was completely normal 5.5.     Therefore my assessment at this time with this patient is that she has a metastatic retro chest wall left side lesion that is solitary, very small, very confined, asymptomatic, very contiguous to the lower  aspect of her heart. The rest of the PET scan is very much negative normal. I would not be surprised if this is an area of the internal mammary node chain.     Obviously the ovarian cyst is benign only locular with a normal  and I find no reason to refer her to GYN oncology anymore.     Therefore my advice to her is as follows:  1. She will remain on her letrozole 2.5 mg a day.  2. She will initiate Kisqali at the usual dose 3 weeks on 1 week off making her aware of the side effects of the medicine including leukopenia, nausea, vomiting, rash, diarrhea, abnormalities in the liver function test and neutropenic fever. She will take the medicine at least for the next 6 months.  3. The patient will proceed with referral to radiation oncology to treat this area completely.  4. I am going to go ahead and make a referral to Cardiology in preparation for this site of radiation therapy and knowing that tangential fields will be difficult to produce, I think it will be important to have her to be seen by Cardiology in preparation of Kisqali use. Also I advised her that I would like for her to take a magnesium supplement and she needs to eat plenty of nuts to minimize hypomagnesemia that can help her to prolong QT interval that is the most important side effect of Kisqali to my eyes. I advised her to continue her blood pressure medication and I advised her also to have an EKG today and also have a repeated EKG upon return in 3 weeks.    5. The patient will be having formal education and consent for Kisqali and she knows that this medicine will come to her from a speciality pharmacy.   6. The patient will require to have a repeat PET scan at least 6-8 weeks after completion of her radiation therapy to the chest wall.   7. I advised her and her brother present on the telephone of all of these events and she was ready to proceed.     • The patient was further reviewed on 07/07/2021 after she has continued her Kisqali and  completion of the 1st round of the medicine. Today her EKG disclosed a normal QT interval and this has been sent to Cardiology to be reported officially. 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 She has completed her radiation therapy to the epicardial right lymph node with no difficulties or side effects.     The thing that is bothering her the most is the pain in the left hip associated with advanced degenerative arthritis and she is limping substantially and having discomfort requiring to take pain medicine, Voltaren, on a regular schedule. She already has been scheduled to have her hip replaced in a few days. In preparation for this I have advised the patient the following.   1. She will discontinue her aspirin and her nonsteroidal anti-inflammatory medications a week in advance for her surgery. This will minimize the potential for bleeding.   2. The patient will hold off on the Kisqali until I review her back in the office in 6 weeks. She will require a blood count done 3 days before the surgery at the same time that she will require her official COVID test before the surgical intervention.   3. The patient will remain on Femara for the time being at 2.5 mg a day. She has no need to stop this medication anytime besides the day of the surgery. She will resume this after the surgery and as soon as she is able to receive oral route.   4. Eventually the patient will require radiological assessment upon review in order to see what happened to the epicardial lymph node.           • The patient was further reviewed on 09/30/2021. She has recovered further from the left hip surgery but she is not doing physical therapy anymore. She has a minimal limp and I advised her to go back on physical therapy on her own at home. She knows how to do the exercises and she has the afternoon off every single day.    The patient completed her Kisqali a week ago. Her white count is low today. The  hemoglobin is stable. The platelet count is stable. She has had issues with the consecution of the Kisqali but the pharmacists have been working on this process with  her specialty pharmacy.     Today it caught my attention the significant bump in her liver function test, SGOT, SGPT. The patient is not drinking any alcohol. She is not taking any cholesterol medicine. She is not taking any anti-inflammatory medication and leads me to think that Kisqali is the culprit of this. Given these findings we advised the patient to put the Kisqali on hold until we recheck chemistry profile on weekly basis for the next 3 or 4 weeks. We need to settle these numbers down before she continues the Kisqali. She probably will require dose adjustment at some point. Again, her hepatitis A, B and C serologies are negative.     Instructed pharmacist to discuss these issues with her as well.     She will be called at home with the report of her CA 15-3.     I encouraged her to remain on her Femara though.     We provided her blood pressure medication. She will remain on this for the time being.     • 1. In regard to her history of breast cancer she was reviewed on 11/19/2021. Since the previous visit the patient has had tumor marker CA 15-3 that has dropped to 20. Radiological assessment of her chest and abdomen CT scan that documents resolution of the epicardial lymphadenopathy in the right hemithorax, no pulmonary nodules or metastasis, no pleural effusion and no liver metastasis. No bone metastasis. Based on this information I do believe that the breast cancer is relatively quiet clinically, biochemically and radiologically and I advised her today to resume her Femara at the dose of 2.5 mg a day. The likelihood of Femara producing liver dysfunction is more than remote.   2. This patient has developed very significant abnormalities in her liver function test with persistent elevation of the SGOT, SGPT and alkaline phosphatase and normal  bilirubin. 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 I have tested a multitude of liver issues including hepatitis A, B and C serologies that were negative, antitrypsin 1 that was normal, antimicrosomal antibodies that was normal, and AARON. Anti-celiac sprue panel also was done, ferritin level, copper also were done looking for Stephen's disease and hemochromatosis. All of these conditions are basically ruled out. I even went ahead and scheduled her to have a liver biopsy that documents according to the pathologist inflammatory process in the liver consistent with drug effect.    We have discontinued the Kisqali 2 months ago thinking that that was the culprit but the numbers have not improved, then we discontinued most of the other supplemental medicines that she was taking, this led the numbers to improve and the only thing that she is left taking is Voltaren. She takes the Voltaren for her arthritis. I advised her on 11/19/2021 that she must discontinue the Voltaren and hopefully this will be making her numbers in regard to liver dysfunction to improve.     We are sure that this patient is not drinking alcohol at all.    We reviewed her medication list today and the only thing that she is taking is metoprolol and vitamin D. We asked her to remain on these medicines.     I even went ahead a couple of weeks ago to put her on a low dose prednisone to see if this had any benefit in her liver function test and it has not. I asked her today to discontinue this medicine as well.     I insisted today that the most likely reason why her liver function test is abnormal after all of the reviewing that we have done and also reviewing an alpha fetoprotein that was negative normal is drug effect. The only medicine that is leftover is antiinflammatory medication. Metoprolol doing this is kind of unexpected and she does not take Tylenol in enough amount to trigger this abnormality neither. She raised the question if she  could take a Tylenol here and there and I think for now it will be okay but she will need to deal with her arthritic symptoms in a topical way or physical therapy way, acupuncture or some other methodology.     I would like for her to come to the office every couple of weeks to do a CBC and a CMP and nurse visit. I will review her back in 6 weeks with a CBC, CMP and CA 15-3. I expect that if the Voltaren is the culprit her liver function test should be normal in 6 weeks.     I will communicate all of these issues to Dar Kirkpatrick MD, who has seen her before. I do not know if he will agree with my assessment but he is welcome to pitch in with any other objection or idea.      I discussed all of these facts with the patient and her brother in the room. I went piece by piece and item by item over all of these issues and significance of each one of them. They understood this clearly.   • The patient was further reviewed on 12/29/2021. Recent review of her liver function tests a few days ago documented persistent elevation of her SGOT, SGPT, and alkaline phosphatase. She has discontinued most of the medicines and I have no other choice but discontinue the Femara. Since then and actually today is the 1st day in many weeks that the SGOT and SGPT and alkaline phosphatase are improving. The patient actually remains asymptomatic in this regard. Her liver is not enlarged. She has no jaundice. She has no rash, no skin lesions and no other new alterations. That is good news. Her white blood count, hemoglobin and platelets remain stable. 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 That is extremely rare. As I posted above, I discussed with all my partners in regard to how many times through their careers prescribing Femara to multiple breast cancers they have seen Femara triggering  hepatitis, none of them gave me a positive answer. I reviewed this information in [employer] and it is reported in less than 1% of patients taking this medication. I think we are in front of a rare situation but I want for her to withhold any further Femara treatment for the time being and I want to review her back in 3 weeks. If the liver function tests continue improving or normalize by then maybe we will have the answer once and for all. Raises the question what we will do next and I think the next medicine will be the utilization of Aromasin that has a different chemical component and different methodology for action. I made her aware of that. We are not going to go back in giving her Kisqali under the present circumstances given the confusion and all the issues pertinent to her liver dysfunction.     She already has been seen by Cardiology with no new issues or commentaries by Dr. Odell and her note has been reviewed today. Actually her blood pressure today looks terrific. Her pulse is normal. Her weight is stable. Her metoprolol is still ongoing in a minimal dose.     Therefore, she will return with a CBC, CMP stat in 3 weeks and further review at that point.  • She was further reviewed on 01/25/2022. Her clinical examination is negative for metastasis, her liver is not enlarged and she has no jaundice. She discontinued her Femara close to 6 weeks ago and I am expecting to review her liver function test today. Also we requested tumor markers. They have been within normal limits.     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 She is advised to undergo therapy with Aromasin at the dose of 25 mg a day. She will require to be reassessed in 1 month.     We are not going to reestablish Kisqali therapy until we are sure that her liver is going to be able to handle all of the medications at this point.    In regard to her blood pressure control we are going to send prescription to her pharmacy  to have a year of refill on this medication. If the patient needs to go back on aspirin or not remains to be seen until we review her liver function test as well. She remains on her vitamin D supplement and she is taking minimal amount of Tylenol for pain.     I will review her back in a month with a CBC, CMP and a CA 15-3.     I discussed all of these facts with the patient and her brother present in the room.   • The patient was further reviewed on 03/07/2022. Since the previous visit the patient has stopped the Femara in 12/2021 and today her liver function tests are completely back to normality. This proves the point that Femara was the culprit phenomenon that I never have seen and discussed with my partner, Danelle Cespedes MD. He never has seen this neither.     In any event the patient's liver function tests are back to normal today. The patient is tolerating her Aromasin extremely well with the exception of hot flashes but otherwise no other new issues. We have her CA 15-3 pending today. This will be discussed with her once that it becomes available. So far we have not documented any radiological or clinical progression of her breast cancer and she will remain again on the Aromasin for the time being.  I am not planning to add any Kisqali to her treatment at this point unless that the tumor marker has a dramatic increase.     In regard to her hypertension the patient will remain on her metoprolol that she has been taking for the time being. In regard to her previous history of stroke she will remain on a baby aspirin.     In regard to pain management for arthritis I asked her to remain on Tylenol to minimize the use of antiinflammatory medication that could have a lot of issues in regard to side effects. She will be allowed to use an antiinflammatory medication like Aleve, ibuprofen, just very much on prn basis very sporadically.     I will review her back in 2 months with a CBC, CMP and a CA  15-3.    Otherwise I am not planning to have any other intervention or radiological analysis unless that again the tumor marker shows significant modification.     I discussed all of these facts with the patient and her daughter present in the room.      • The patient returned on 05/09/2022 with no symptoms of anything in particular besides minimal respiratory allergies and pain in the left hip associated with her previous surgery. Her clinical examination today is very much unremarkable, she looks fantastic. She has no symptoms or signs of breast cancer recurrence. White count, hemoglobin and platelets are normal. Her tumor marker CA 15-3 has remained normal and actually lower than ever and compliance with Aromasin has been 100% with excellent tolerance.     Given the circumstances of this I advised her to remain on Aromasin 25 mg a day and I find no use for this patient to go back onto Kisqali or medicines of this nature.     From the point of view of her abnormal liver function abnormalities associated with Femara utilization her chemistry profile today is completely normal including SGOT, SGPT, bilirubin, alkaline phosphatase. On clinical grounds her liver is not enlarged. It is impressive to see how much hepatitis she got out of Femara and how quickly she got better after stopping the Femara. This is extremely unusual but it happened.    I notified the patient of this good finding and obviously she will never take Femara again.    I will review her back in 6-8 weeks to continue monitoring the clinical situation along with a CBC, CMP and CA 15-3.     Finally I encouraged her to remain on her aspirin and be sure that she takes her blood pressure medication. I provide these medicines for her.    Also Toprol will be continued and gabapentin for hot flashes or insomnia could be utilized and could be utilized as well for pain.    I discussed all of these facts with the patient and her brother in the room.   On  08/16/2022 we reviewed the patient in regard to her history of breast cancer. Clinically she has not had any obvious recurrent disease in the chest wall in the lung anatomy or abdomen or pelvis. She remains on Aromasin adjuvantly and we are waiting to review tumor marker. She has a chest wall that is completely flat due to previous mastectomies and radiation therapy. There is no axillary adenopathies and the patient has in my opinion control of her disease process as far as we can tell. I advised her to remain on Aromasin 25 mg a day. I went ahead and scheduled a visit with us in a few weeks in order to further review radiological analysis that will be discussed below.  • On 09/14/2022 the patient has had in the interim a CT scan of the chest, abdomen and pelvis for review personally. Her pulmonary anatomy remains normal, there is no pulmonary congestion, pleural effusions, pericardial effusion, cardiomegaly, hilar or mediastinal adenopathy. There is prepericardiac lymph node measuring almost 1.5 to 2 cm in size that is flat like a small finger that has been present before and has minimally enlarged. If this has anything to do with her previous history of breast cancer is difficult to state but this has been evaluated before with similarity in regards size and some degree of fluctuation. I think this does not constrain me from thinking with a normal tumor marker of CA 15-3 of 20 during the previous visit or compel to perform either removal or biopsy at this time or proceed with radiological assessment with a PET scan. I do believe that this is not representation of anything in particular. Her liver anatomy and the rest of the bony anatomy, pancreas, kidneys, spleen and retroperitoneum remain unchanged. Given this finding I advised the patient to proceed and continue her Aromasin without the need to add any other medication to her regimen of medication especially in the background of A-fib. I advised her to proceed  with reassessment with me in 6 weeks with a repeat CBC, CMP and CA 15-3. In that moment also we will obtain a liquid biopsy for further analysis.   •   • Her liver function test has remained normal and Aromasin has not produced any chemical hepatitis. I advised her again to remain on this medicine by itself.   On 10/25/2022 the patient remains on Aromasin. She has no symptoms or signs that would indicate breast cancer recurrence and the latest tumor marker CA 15-3 was 20 two months ago. We have another one pending today. Given these circumstances I advised her to remain on Aromasin for the time being. I find no need for radiological assessment at this time. She will be called at home with the report of her tumor marker.     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 Her liver enzymes are completely normal today. She remains on Aromasin and obviously this patient never will be back on letrozole under any circumstances. This was documented through liver biopsy and through removal of the medication that triggered quick improvement in her liver function test.  •  On 09/14/2022 there is no evidence of drug induced hepatitis. Aromasin will be continued. The patient is aware that she never will be able to take Femara.   On 10/25/2022 her liver enzymes are completely normal today. Since discontinuation of Femara her drug induced hepatitis has resolved. This situation was extremely rare.     •   ·   3.The patient has developed an episode of septic arthritis in many joints including left shoulder and left ankle. She required antibiotic therapy as per recently. Infectious Disease was involved in this. In fact I discussed the case with them on the telephone. I suggested choosing the PICC line to be placed on the right arm in order to be able to deliver antibiotic therapy according to the proper indication. She completed the PICC line and it was removed last week with no complications or problems. The  right upper extremity is completely normal. It is difficult for me to know why she developed the septic arthritis. She is in contact with horses all the time. The pathogen that she had in the joint is very uncommon in my opinion and the patient had no obvious source including no sinuses, no dental source, no obvious cardiac source, no gastrointestinal or genitourinary source and no skin source. It raises the question if this pathogen could evade to colonic source and I think I feel the obligation for this patient to have at least a CT scan of the abdomen and pelvis and eventually in several months from now consider a colonoscopy. Another consideration could be a Cologuard in some way. At least the patient’s infection seems to be under control. Her joints are still sore today including left shoulder, left ankle. Local inflammatory signs are very much gone. The only area of inflammation that she has in the 1st MP joint on the left hand probably represents osteoarthritis, no more than that. She will remain in observation from this point of view.  On 09/14/2022 the patient has no symptoms that would suggest any further spreading or new development of septic arthritis. My fear was related to the possible seeding of her hip replacement areas by bacteria during the bacteremia that she had not too long ago. It seems that that is not the case. Radiologically speaking I do not see any local inflammation or reaction in any of these anatomical sites. There is perfect symmetry in the hip areas in regard to all her hardware material. Her sedimentation rate is BACK TO normal. Her white count is normal and this process will be watched in absence of any other intervention.  On 10/25/2022 no new episodes of septic arthritis. I measured her sedimentation rate during the previous visit that was down to 9, previously was in the 8-9 category. This means resolution of an inflammatory process altogether.     •   ·   4.The patient developed  atrial fibrillation with rapid ventricular response during the hospitalization with septic arthritis. Echocardiogram documented very dilated left atrium. She is now receiving Eliquis, metoprolol, and digoxin. Her ventricular response is controlled today but she remains in AFib. She has requested a followup with Dr. Odell and I went ahead and made her an appointment. 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 If she had a fall and she is taking anticoagulants the only thing that we are going to have is just trouble. She has sold one of the horses. She will sell the other horse very soon and she will remain on land for the time being. Her  is back in town and he is helping her with consecution of groceries and things of this nature under the present circumstances. I advised her to minimize any trauma.  On 09/14/2022 the patient will be seen by Electrophysiology tomorrow in regard to consideration of cardioversion for her AFib. She has discussed this with Dr. Odell and I have reviewed this data. That is perfectly fine from my point of view. I find no form of contraindication from my side of the story if this is the methodology that is chosen to try to revert her to normal sinus rhythm.  On 10/25/2022 the patient is in normal sinus rhythm today. She remains on anticoagulants. She has cardiology appointment tomorrow. I asked her to buy a pulse oximeter and I taught her how to interpret the little wave curve of the pulse oximeter in regard to her heart rate. Typically patients in A-fib have very irregular pulse chaotic and the little wave curves will be continuously changing and besides the pulse rate will be continuously changing depending on the speed of the process. That is very simple to interpret. If se develops that problem I advised her to call her cardiologist.     •   ·   5.The patient has a grade 3 lymphedema in the  left upper extremity so tight in the sleeve that she had them done before is not even fitting. She has no obvious infection as far as I can tell. I went and made an urgent referral to the Lymphedema Clinic today to see if further bandage and drainage of this and massage would be helpful to her in the long run to control the problem. I still advised her to be extremely careful in regard to any injury or lesions in the skin of the left upper extremity to minimize any potentiality of cellulitis. In the long run if she has any episodes she will need to be back on antibiotics right away.  •  On 09/14/2022 the patient's lymphedema in the left upper extremity continues. She already has an appointment to be seen by the lymphedema clinic, she will require a new sleeve and massage and interventional therapy for this. She is aware that she needs to avoid any trauma in the left upper extremity to minimize any cellulitis. I strongly believe as posted before that septic arthritis is part of her lymphedema issues.   On 10/25/2022 her lymphedema in the left upper extremity is very much resolved with the sleeve and all of the manipulation that she has had in the lymphedema clinic. I advised her to be very prudent in regard doing any nicking in the skin and damaging the skin pertinent to the left upper extremity to minimize cellulitis and potentiality for further problems.     •   ·   6.In regard to hypertension management I have controlled this before. Now she is taking quite amount of metoprolol. I will give up the management of this and now that she will see Dr. Odell, they will provide the care of this issue. I would not be surprised if the patient in the long run needs to be receiving another blood pressure medication given the fact that her blood pressure is still marginally elevated today. Taking digoxin, I do not think Lasix or hydrochlorothiazide will be a good choice because of the potential for hypokalemia unless the  potassium is replaced and monitored very close. This will probably minimize the potentiality for digoxin toxicity.  • On 09/14/2022 her blood pressure is controlled with the metoprolol that is provided by the cardiology team. She was advised again to avoid antiinflammatory medication for pain control.   On 10/25/2022 her blood pressure is under good control and I advised her to continue taking her blood pressure medication and once more I advised against the use of any antiinflammatory medication by oral route.     •   ·     7.The patient used to take copious amount of anti-inflammatory medication for arthritis, aches and pains. Given the fact that she has now been placed on anticoagulant, she is aware that anticoagulant and anti-inflammatory medicines are not good friends. The only medicine that she can take for pain is Tylenol. If the pain is bad enough she will require a prescription for Lortab that she is no longer taking.    8.On 08/16/2022 given the persistency of the pain in the left hip no matter what she does, I went ahead and scheduled a CT scan of the pelvis to be done before her next visit. Hopefully this will allow me to delineate the anatomy of this and I want to pray that this joint was not BACTERIAL SEEDING during the time of bacteremia and septic arthritis. So far on clinical grounds her white count is normal. We have requested a sedimentation rate to be done today and this will need to be monitored. She states that this hip goes out of socket frequently and she has to end up in the emergency room.     I discussed all these facts with the patient’s brother who came all the way from [place]. I reviewed the records pertinent to this patient's care including all the records available in [facility] from the recent hospitalization at [facility] and I discussed the case with her infectious disease physician.  On 09/14/2022 I do not see anything that suggests any spreading of inflammatory infectious  process throughout her skeleton anymore. Her hips look very symmetric in regard to the hardware and she has no symptoms that suggest any new infection with a normal white count today and pending sedimentation rate.  On 10/25/2022 the left hip remains painful especially when she first wakes up in the morning, thereafter warms up and she feels better. She has not had any luxation of the hip in the last several months.    •   9.This patient has very significant insomnia. The problem is what to provide her for this problem at this time. There are cardiac issues. I do believe that this patient will be better off at least for the next 6 weeks taking a benzodiazapine or something of this nature more than any other medication. I do not feel compelled to give her gabapentin that actually has not worked for her in the past. Therefore I went ahead and prescribed for her medication in this regard today to take it at bedtime to see if this allows her to sleep properly.     On 10/25/2022 the patient persists having insomnia not sleeping more than 3 hours taking Ambien. I went ahead and discontinued this medicine. I gave her Seroquel 25 mg tablets to take 1 orally nightly. I asked her to call my nurse Priya Gonzalez next week and let us know how she is doing in that arena.     I will review her back in 2 months with a CBC, CMP and a CA 15-3. I advised her to buy the pulse oximeter as stated. I discusses these facts also with the patient's brother present in the room.     ·

## 2022-10-27 ENCOUNTER — OFFICE VISIT (OUTPATIENT)
Dept: CARDIOLOGY | Facility: CLINIC | Age: 62
End: 2022-10-27

## 2022-10-27 ENCOUNTER — HOSPITAL ENCOUNTER (OUTPATIENT)
Dept: OCCUPATIONAL THERAPY | Facility: HOSPITAL | Age: 62
Setting detail: THERAPIES SERIES
Discharge: HOME | End: 2022-10-27

## 2022-10-27 VITALS
SYSTOLIC BLOOD PRESSURE: 126 MMHG | BODY MASS INDEX: 24.59 KG/M2 | HEART RATE: 55 BPM | WEIGHT: 153 LBS | HEIGHT: 66 IN | DIASTOLIC BLOOD PRESSURE: 88 MMHG

## 2022-10-27 DIAGNOSIS — I48.19 PERSISTENT ATRIAL FIBRILLATION: Primary | ICD-10-CM

## 2022-10-27 DIAGNOSIS — Z17.0 MALIGNANT NEOPLASM OF OVERLAPPING SITES OF LEFT BREAST IN FEMALE, ESTROGEN RECEPTOR POSITIVE: ICD-10-CM

## 2022-10-27 DIAGNOSIS — I10 PRIMARY HYPERTENSION: ICD-10-CM

## 2022-10-27 DIAGNOSIS — I97.2 POST-MASTECTOMY LYMPHEDEMA SYNDROME: Primary | ICD-10-CM

## 2022-10-27 DIAGNOSIS — C50.812 MALIGNANT NEOPLASM OF OVERLAPPING SITES OF LEFT BREAST IN FEMALE, ESTROGEN RECEPTOR POSITIVE: ICD-10-CM

## 2022-10-27 PROCEDURE — 93000 ELECTROCARDIOGRAM COMPLETE: CPT

## 2022-10-27 PROCEDURE — 99213 OFFICE O/P EST LOW 20 MIN: CPT

## 2022-10-27 PROCEDURE — 97140 MANUAL THERAPY 1/> REGIONS: CPT

## 2022-10-27 NOTE — PROGRESS NOTES
Date of Office Visit: 10/27/2022  Encounter Provider: ISAIAS Keller  Place of Service: [facility] CARDIOLOGY  Patient Name: Marylu Georges  :1960    Chief Complaint   Patient presents with   • Atrial Fibrillation   :     HPI: Marylu Georges is a 62 y.o. female who is followed by Dr. Odell, referred to Dr. Gustafson for persistent atrial fibrillation---s/p CV (2022). She also has history of CVA, syncope, and breast cancer---currently stable on maintenence chemo.    She was hospitalized in July with infectious arthritis and went into atrial fibrillation which was new for her. She was started on metoprolol and digoxin but rate was difficult to control.     She saw Dr. Gustafson on 9/15/2022 and was in persistent atrial fibrillation. He discussed treatment options with her and since it was her first episode, they elected to pursue CV for rhythm control. [id number] was 1, but with her cancer she was at increased risk of stroke, AC was continued.  Plan was AAD if she had recurrence.               She presents today for follow up appt.     Maintaining NSR since CV last month.      Says that she feels much better. The palpitations and dizziness have subsided.     She denies any chest pain, dyspnea, palpitations, dizziness, or fatigue.     EKG today shows NSR.     She has started walking and exercising again now that she feels better.     On metoprolol for rate control.     R-ban for AC.     She is on maintenance chemo for metastatic breast cancer.       Past Medical History:   Diagnosis Date   • Anemia in neoplastic disease    • Arthritis    • Arthritis of back    • Arthritis of neck    • Breast cancer (HCC)     Left   • CVA (cerebral vascular accident) (HCC)    • Fracture, fibula    • Fracture, finger    • Frozen shoulder    • Hip arthrosis    • Hip pain     RIGHT HIP... CYST   • History of fracture of leg    • History of radiation therapy     LAST TREATMENT      • Hypertension    • Knee swelling    • Limited joint range of motion (ROM)     RIGHT HIP   • Low back strain    • Periarthritis of shoulder    • Rotator cuff syndrome 6/22   • Skin sore     OPEN SORE LEFT BREAST   • Syncope    • Vertigo        Past Surgical History:   Procedure Laterality Date   • AXILLARY LYMPH NODE BIOPSY/EXCISION Right     LYMPH NODE UNDER RIGHT ARM-MALIGNANT (DOUBLE MASTECTOMY)   • BREAST BIOPSY Left     MALIGNANT   • FRACTURE SURGERY  1987    Leg   • HARDWARE REMOVAL     • INCISION AND DRAINAGE LEG Left 06/30/2022    Procedure: INCISION AND DRAINAGE left ankle;  Surgeon: Kenneth Tran MD;  Location: [facility];  Service: Orthopedics;  Laterality: Left;   • JOINT REPLACEMENT  mar 2020,july 2021    hips   • MASTECTOMY W/ SENTINEL NODE BIOPSY Bilateral 09/16/2019    Procedure: BILATERLA MODIFIED RADICAL MASTECTOMY WITH BILATERAL SENTINEL LYMPH NODE BIOPSY;  Surgeon: Joby Barron Jr., MD;  Location: [facility];  Service: General   • TOTAL HIP ARTHROPLASTY Right 03/02/2020    Procedure: RIGHT TOTAL HIP ARTHROPLASTY NATALY NAVIGATION;  Surgeon: Luis M Leonard MD;  Location: [facility] OR;  Service: Orthopedics;  Laterality: Right;   • TOTAL HIP ARTHROPLASTY Left 07/20/2021    Procedure: Posterior LEFT TOTAL HIP ARTHROPLASTY NATALY NAVIGATION;  Surgeon: Luis M Leonard MD;  Location: [facility] OR;  Service: Orthopedics;  Laterality: Left;   • VENOUS ACCESS DEVICE (PORT) INSERTION Right 02/01/2019    Procedure: INSERTION VENOUS ACCESS DEVICE;  Surgeon: Joby Barron Jr., MD;  Location: [facility];  Service: General   • VENOUS ACCESS DEVICE (PORT) REMOVAL N/A 10/30/2019    Procedure: Mediport Removal;  Surgeon: Joby Barron Jr., MD;  Location: [facility];  Service: General       Social History     Socioeconomic History   • Marital status:      Spouse name: Cruz   • Number of children: 0   Tobacco Use   • Smoking status: Never   • Smokeless tobacco: Never  "  Vaping Use   • Vaping Use: Never used   Substance and Sexual Activity   • Alcohol use: Yes     Comment: 2 CUPS DAILY   • Drug use: No   • Sexual activity: Not Currently     Partners: Male     Birth control/protection: Abstinence       Family History   Problem Relation Age of Onset   • Lung cancer Father    • Irritable bowel syndrome Father    • Cancer Mother    • Breast cancer Mother    • Liver disease Mother    • Breast cancer Sister 48   • Breast cancer Maternal Grandmother    • Breast cancer Paternal Grandmother    • Breast cancer Maternal Uncle    • Malig Hyperthermia Neg Hx        Review of Systems   Constitutional: Negative for chills, fever and malaise/fatigue.   Cardiovascular: Negative for chest pain, dyspnea on exertion, leg swelling, near-syncope, orthopnea, palpitations, paroxysmal nocturnal dyspnea and syncope.   Respiratory: Negative for cough and shortness of breath.    Hematologic/Lymphatic: Negative.    Musculoskeletal: Negative for joint pain, joint swelling and myalgias.   Gastrointestinal: Negative for abdominal pain, diarrhea, melena, nausea and vomiting.   Genitourinary: Negative for frequency and hematuria.   Neurological: Negative for light-headedness, numbness, paresthesias and seizures.   Allergic/Immunologic: Negative.    All other systems reviewed and are negative.      Allergies   Allergen Reactions   • Benadryl [Diphenhydramine] Itching     INCREASES BLOOD PRESSURE   • Erythromycin GI Intolerance   • Levaquin [Levofloxacin] GI Intolerance   • Penicillins GI Intolerance         Current Outpatient Medications:   •  acetaminophen (TYLENOL) 325 MG tablet, Take 650 mg by mouth Every 6 (Six) Hours As Needed., Disp: , Rfl:   •  apixaban (ELIQUIS) 5 MG tablet tablet, Take 1 tablet by mouth Every 12 (Twelve) Hours. Indications: Atrial Fibrillation, Disp: 60 tablet, Rfl:   •  Cholecalciferol 250 MCG (86190 UT) tablet, Take 1 tablet by mouth. Patient stated dose as \"1500 mg\", Disp: , Rfl:   •  " "Diclofenac Sodium (VOLTAREN) 1 % gel gel, Apply 4 g topically to the appropriate area as directed 4 (Four) Times a Day As Needed. RARE PRN, Disp: , Rfl:   •  exemestane (AROMASIN) 25 MG chemo tablet, TAKE ONE TABLET BY MOUTH DAILY, Disp: 30 tablet, Rfl: 3  •  famotidine (PEPCID) 20 MG tablet, Take 1 tablet by mouth 2 (Two) Times a Day Before Meals., Disp: , Rfl:   •  metoprolol succinate XL (TOPROL-XL) 50 MG 24 hr tablet, Take 1 tablet by mouth 2 (Two) Times a Day for 180 days., Disp: 60 tablet, Rfl: 5  •  polyethylene glycol (polyethylene glycol) 17 g packet, Take 17 g by mouth 2 (Two) Times a Day., Disp: , Rfl:   •  QUEtiapine (SEROquel) 25 MG tablet, Take 1 tablet by mouth Every Night. (Patient taking differently: Take 1 tablet by mouth Every Night. HAVEN'T STARTED YET), Disp: 30 tablet, Rfl: 2  •  sodium chloride 0.65 % nasal spray,  1 Spray, Nasal, Drops, Q4H, PRN Nasal Congestion, 0 Refill(s), Disp: , Rfl:   •  tiZANidine (ZANAFLEX) 4 MG tablet, Take 1 tablet by mouth Every 6 (Six) Hours As Needed for Muscle Spasms., Disp: , Rfl:   No current facility-administered medications for this visit.    Facility-Administered Medications Ordered in Other Visits:   •  Chlorhexidine Gluconate Cloth 2 % pads 1 each, 1 each, Apply externally, Take As Directed, Luis M Leonard MD      Objective:     Vitals:    10/27/22 1027   BP: 126/88   Pulse: 55   Weight: 69.4 kg (153 lb)   Height: 167.6 cm (66\")     Body mass index is 24.69 kg/m².    PHYSICAL EXAM:    Vitals Reviewed.   General Appearance: No acute distress, well developed and well nourished.   Eyes: Conjunctiva and lids: No erythema, swelling, or discharge. Sclera non-icteric.   HENT: Atraumatic, normocephalic. External eyes, ears, and nose normal.   Respiratory: No signs of respiratory distress. Respiration rhythm and depth normal.   Clear to auscultation. No rales, crackles, rhonchi, or wheezing auscultated.   Cardiovascular:  Heart Rate and Rhythm: Normal, Heart " Sounds: Normal S1 and S2. No S3 or S4 noted.  Gastrointestinal:  Abdomen soft, non-distended, non-tender.   Musculoskeletal: Normal movement of extremities  Skin: Warm and dry.   Psychiatric: Patient alert and oriented to person, place, and time. Speech and behavior appropriate. Normal mood and affect.       ECG 12 Lead    Date/Time: 10/27/2022 2:54 PM  Performed by: Amador Polanco APRN  Authorized by: Amador Polanco APRN   Comparison: compared with previous ECG   Similar to previous ECG  Rhythm: sinus bradycardia  BPM: 55                Assessment:       Diagnosis Plan   1. Persistent atrial fibrillation (HCC)        2. Primary hypertension               Plan:   1. Persistent AF---s/p CV (9/2022)--- maintaining NSR since CV. Symptoms have subsided. EKG today shows NSR. Continue metoprolol. LUDP2INGP is 1, but with cancer diagnosis puts her at higher risk of stroke. Continue apixban.     2. HTN-- well controlled.       If she has recurrence of AF would consider AAD. Normal kidney function.      She will follow up in 6 months with Dr. Gustafson.            As always, it has been a pleasure to participate in your patient's care.      Sincerely,         ISAIAS Keller

## 2022-10-27 NOTE — THERAPY TREATMENT NOTE
Outpatient Occupational Therapy Lymphedema Treatment Note  [facility]     Patient Name: Marylu Georges  : 1960  MRN: [id number]  Today's Date: 10/27/2022      Visit Date: 10/27/2022    Patient Active Problem List   Diagnosis   • Malignant neoplasm of overlapping sites of left breast in female, estrogen receptor positive (HCC)   • Family history of breast cancer in female   • Syncope   • Malignant neoplasm of overlapping sites of both breasts in female, estrogen receptor positive (HCC)   • Hypertension   • Encounter for long-term (current) use of other medications   • Drug-induced hepatitis   • Atrial fibrillation (HCC)   • Transaminitis   • Lymphedema of upper extremity, bilateral   • Rash   • Inflammatory arthritis   • Streptococcal arthritis of left ankle (HCC)   • New onset atrial fibrillation (HCC)   • Primary insomnia        Past Medical History:   Diagnosis Date   • Anemia in neoplastic disease    • Arthritis    • Arthritis of back    • Arthritis of neck    • Breast cancer (HCC)     Left   • CVA (cerebral vascular accident) (HCC)    • Fracture, fibula    • Fracture, finger    • Frozen shoulder    • Hip arthrosis    • Hip pain     RIGHT HIP... CYST   • History of fracture of leg    • History of radiation therapy     LAST TREATMENT     • Hypertension    • Knee swelling    • Limited joint range of motion (ROM)     RIGHT HIP   • Low back strain    • Periarthritis of shoulder    • Rotator cuff syndrome    • Skin sore     OPEN SORE LEFT BREAST   • Syncope    • Vertigo         Past Surgical History:   Procedure Laterality Date   • AXILLARY LYMPH NODE BIOPSY/EXCISION Right     LYMPH NODE UNDER RIGHT ARM-MALIGNANT (DOUBLE MASTECTOMY)   • BREAST BIOPSY Left     MALIGNANT   • FRACTURE SURGERY      Leg   • HARDWARE REMOVAL     • INCISION AND DRAINAGE LEG Left 2022    Procedure: INCISION AND DRAINAGE left ankle;  Surgeon: Kenneth Tran MD;  Location: [facility] OR;  Service:  Orthopedics;  Laterality: Left;   • JOINT REPLACEMENT  mar 2020,july 2021    hips   • MASTECTOMY W/ SENTINEL NODE BIOPSY Bilateral 09/16/2019    Procedure: BILATERLA MODIFIED RADICAL MASTECTOMY WITH BILATERAL SENTINEL LYMPH NODE BIOPSY;  Surgeon: Joby Barron Jr., MD;  Location: [facility] MAIN OR;  Service: General   • TOTAL HIP ARTHROPLASTY Right 03/02/2020    Procedure: RIGHT TOTAL HIP ARTHROPLASTY NATALY NAVIGATION;  Surgeon: Luis M Leonard MD;  Location: [facility] MAIN OR;  Service: Orthopedics;  Laterality: Right;   • TOTAL HIP ARTHROPLASTY Left 07/20/2021    Procedure: Posterior LEFT TOTAL HIP ARTHROPLASTY NATALY NAVIGATION;  Surgeon: Luis M Leonard MD;  Location: [facility] MAIN OR;  Service: Orthopedics;  Laterality: Left;   • VENOUS ACCESS DEVICE (PORT) INSERTION Right 02/01/2019    Procedure: INSERTION VENOUS ACCESS DEVICE;  Surgeon: Joby Barron Jr., MD;  Location: [facility];  Service: General   • VENOUS ACCESS DEVICE (PORT) REMOVAL N/A 10/30/2019    Procedure: Mediport Removal;  Surgeon: Joby Barron Jr., MD;  Location: [facility] MAIN OR;  Service: General         Visit Dx:      ICD-10-CM ICD-9-CM   1. 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 Malignant neoplasm of overlapping sites of left breast in female, estrogen receptor positive (HCC)  C50.812 174.8    Z17.0 V86.0        Lymphedema     Row Name 10/27/22 0800             BUE Circumferential (cm)    Measurement Location 1 axilla  -RE      Left 1 30 cm  -RE      Measurement Location 2 15 cm above elbow  -RE      Left 2 27 cm  -RE      Measurement Location 3 10 cm above elbow  -RE      Left 3 27 cm  -RE      Measurement Location 4 5 cm above elbow  -RE      Left 4 26 cm  -RE      Measurement Location 5 Elbow  -RE      Left 5 25 cm  -RE      Measurement Location 6 5 cm below elbow  -RE      Left 6 24.7 cm  -RE      Measurement Location 7 10 cm below elbow  -RE      Left 7 22.5 cm  -RE      Measurement Location 8 15 cm below elbow  -RE       Left 8 18.5 cm  -RE      Measurement Location 9 20 cm below elbow  -RE      Left 9 15.2 cm  -RE      Measurement Location 10 Wrist  -RE      Left 10 15 cm  -RE      Right 10 15 cm  -RE      Measurement Location 11 Mid palm  -RE      Left 11 18.2 cm  -RE      LUE Circumferential Total 249.1 cm  -RE      RUE Circumferential Total 15 cm  -RE         Manual Lymphatic Drainage    Manual Lymphatic Drainage initial sequence;opened regional lymph nodes;extremity treatment;opened anastamoses  -RE      Initial Sequence short neck;shoulder collectors  -RE      Shoulder Collectors right;left  -RE      Opened Regional Lymph Nodes inguinal  -RE      Inguinal left  -RE      Opened Anastamoses axillo-inguinal  -RE      Axillo-Inguinal left  -RE      Extremity Treatment MLD to full limb  -RE      MLD to Full Limb left  -RE         Compression/Skin Care    Compression/Skin Care Comments pt to bandage at home. Needs Juzo 3511 size III reg length with SB.  -RE            User Key  (r) = Recorded By, (t) = Taken By, (c) = Cosigned By    Initials Name Provider Type    Shavonne Douglas OTR Occupational Therapist                         OT Assessment/Plan     Row Name 10/27/22 1443          OT Assessment    Assessment Comments Pt has progressed well with only mild edema along the ulna. She will continue to wear a sleeve during the day and bandage at night. I will order new compression sleeves as her current garment should be replaced soon. I did remeasure and she needs the same size.  -RE        OT Plan    OT Plan Comments Follow up if she has any issues. I will send a sleeve order to [facility].  -RE           User Key  (r) = Recorded By, (t) = Taken By, (c) = Cosigned By    Initials Name Provider Type    Shavonne Douglas OTR Occupational Therapist                    Manual Rx (last 36 hours)     Manual Treatments     Row Name 10/27/22 1448             Total Minutes    78460 - OT Manual Therapy Minutes 50  -RE            User Key  (r) =  Recorded By, (t) = Taken By, (c) = Cosigned By    Initials Name Provider Type    Shavonne Douglas, OTR Occupational Therapist               OT Goals     Row Name 10/27/22 1400          OT Short Term Goals    STG Date to Achieve 10/31/22  -RE     STG 1 Patient will demonstrate proper awareness of “What is Lymphedema?” and “Healthy Habits” for improved prevention, management, care of symptoms and ease of transition to self-care of condition.  -RE     STG 1 Progress Met  -RE     STG 2 Patient independent and compliant with self-wrapping techniques of compression bandages with family member as indicated for improved self-management of condition.  -RE     STG 2 Progress Met  -RE     STG 3 Patient will demonstrate decreased net edema of left upper extremity >/= 3-5 cm for decrease in symptoms, decreased risk of infection and improved skin care/transition to self-care of condition.  -RE     STG 3 Progress Met  -RE        Long Term Goals    LTG Date to Achieve 11/14/22  -RE     LTG 1 Patient will demonstrate decreased net edema of left upper extremity >/= 6-15 cm for decrease in symptoms, decreased risk of infection and improved skin care/transition to self-care of condition.  -RE     LTG 1 Progress Met  -RE     LTG 2 Patient independent and compliant with self-massage techniques with spouse/family member as needed for improved lymphatic drainage, decreased edema/symptoms, decreased risk of infection and improved transition to self-care of condition.  -RE     LTG 2 Progress Not Met  -RE     LTG 2 Progress Comments discontinued. Not needed based on current symptoms  -RE     LTG 3 Patient independent and compliant with initial home exercise program focused on diaphragmatic breathing, range of motion, flexibility to decrease edema and improve lymphatic flow for decreased edema and decreased risk of infection.  -RE     LTG 3 Progress Ongoing  -RE     LTG 4 Patient independent and compliant with compression garments and night  compression as indicated for self-management of edema.  -RE     LTG 4 Progress Met  -RE     LTG 5 Patient to improve DASH/ QuickDASH score by 10 points in 4 weeks.  -RE     LTG 5 Progress Ongoing  -RE        Time Calculation    OT Goal Re-Cert Due Date 12/15/22  -RE           User Key  (r) = Recorded By, (t) = Taken By, (c) = Cosigned By    Initials Name Provider Type    RE Shavonne Gonzalez OTR Occupational Therapist                Therapy Education  Education Details: Continue with daily sleeve wear and bandage at night.  Given: Edema management  Program: Reinforced  How Provided: Verbal  Provided to: Patient  Level of Understanding: Teach back education performed, Verbalized  91664 - OT Self Care/Mgmt Minutes: 5                Time Calculation:   OT Start Time: 0820  OT Stop Time: 0915  OT Time Calculation (min): 55 min  Total Timed Code Minutes- OT: 55 minute(s)  Timed Charges  12080 - OT Manual Therapy Minutes: 50  99660 - OT Self Care/Mgmt Minutes: 5  Total Minutes  Timed Charges Total Minutes: 55   Total Minutes: 55     Therapy Charges for Today     Code Description Service Date Service Provider Modifiers Qty    [id number]  OT MANUAL THERAPY EA 15 MIN 10/27/2022 Shavonne Gonzalez OTR GO 4                      PARAS Hackett  10/27/2022

## 2022-10-28 ENCOUNTER — APPOINTMENT (OUTPATIENT)
Dept: LAB | Facility: HOSPITAL | Age: 62
End: 2022-10-28

## 2022-10-31 ENCOUNTER — APPOINTMENT (OUTPATIENT)
Dept: OCCUPATIONAL THERAPY | Facility: HOSPITAL | Age: 62
End: 2022-10-31

## 2022-11-02 ENCOUNTER — OFFICE VISIT (OUTPATIENT)
Dept: ORTHOPEDIC SURGERY | Facility: CLINIC | Age: 62
End: 2022-11-02

## 2022-11-02 VITALS — BODY MASS INDEX: 24.91 KG/M2 | HEIGHT: 66 IN | WEIGHT: 155 LBS | TEMPERATURE: 97.3 F

## 2022-11-02 DIAGNOSIS — M54.50 LUMBAR BACK PAIN: Primary | ICD-10-CM

## 2022-11-02 PROCEDURE — 99213 OFFICE O/P EST LOW 20 MIN: CPT | Performed by: ORTHOPAEDIC SURGERY

## 2022-11-04 NOTE — PROGRESS NOTES
Doing well and all counts.  No fever or chills.  Back pain is manageable.  It was never really clear whether her discitis was inflammatory or infectious but as she had other hematogenous sources of infection it was assumed to be the latter.  Markedly better now.  Good strength in the legs bilaterally and no leg pain.  Overall she is pleased with the outcome and I do not think further treatment is needed.  No new x-rays today.  I will see her back as needed.  She understands I am retiring at the end of the year and will see Dr. Bansal if needed.

## 2022-12-06 ENCOUNTER — OFFICE VISIT (OUTPATIENT)
Dept: CARDIOLOGY | Facility: CLINIC | Age: 62
End: 2022-12-06

## 2022-12-06 VITALS
DIASTOLIC BLOOD PRESSURE: 70 MMHG | HEIGHT: 66 IN | HEART RATE: 60 BPM | BODY MASS INDEX: 25.39 KG/M2 | SYSTOLIC BLOOD PRESSURE: 110 MMHG | WEIGHT: 158 LBS

## 2022-12-06 DIAGNOSIS — C50.812 MALIGNANT NEOPLASM OF OVERLAPPING SITES OF BOTH BREASTS IN FEMALE, ESTROGEN RECEPTOR POSITIVE: ICD-10-CM

## 2022-12-06 DIAGNOSIS — Z17.0 MALIGNANT NEOPLASM OF OVERLAPPING SITES OF BOTH BREASTS IN FEMALE, ESTROGEN RECEPTOR POSITIVE: ICD-10-CM

## 2022-12-06 DIAGNOSIS — I27.20 PULMONARY HYPERTENSION: ICD-10-CM

## 2022-12-06 DIAGNOSIS — I48.19 PERSISTENT ATRIAL FIBRILLATION: ICD-10-CM

## 2022-12-06 DIAGNOSIS — C50.812 MALIGNANT NEOPLASM OF OVERLAPPING SITES OF LEFT BREAST IN FEMALE, ESTROGEN RECEPTOR POSITIVE: Primary | ICD-10-CM

## 2022-12-06 DIAGNOSIS — Z17.0 MALIGNANT NEOPLASM OF OVERLAPPING SITES OF LEFT BREAST IN FEMALE, ESTROGEN RECEPTOR POSITIVE: Primary | ICD-10-CM

## 2022-12-06 DIAGNOSIS — C50.811 MALIGNANT NEOPLASM OF OVERLAPPING SITES OF BOTH BREASTS IN FEMALE, ESTROGEN RECEPTOR POSITIVE: ICD-10-CM

## 2022-12-06 DIAGNOSIS — I10 PRIMARY HYPERTENSION: ICD-10-CM

## 2022-12-06 PROCEDURE — 93000 ELECTROCARDIOGRAM COMPLETE: CPT | Performed by: INTERNAL MEDICINE

## 2022-12-06 PROCEDURE — 99214 OFFICE O/P EST MOD 30 MIN: CPT | Performed by: INTERNAL MEDICINE

## 2022-12-06 NOTE — PROGRESS NOTES
Date of Office Visit: 2022  Encounter Provider: Danni Odell MD  Place of Service: [facility] CARDIOLOGY  Patient Name: Marylu Georges  :1960    Chief complaint  Cardio oncology care, atrial fibrillation.    History of Present Illness  Patient is a 61-year-old female with history of hypertension, prior stroke, left-sided breast cancer.  She had a large mass that was neglected for a period of time and became quite gigantic and ulcerated.  By 2019 she was diagnosed with breast cancer.  She had significant improvement with AC therapy in 2019 (total dose 243 mg/m² of Adriamycin) and this was followed by Taxol therapy which is completed on 2019. She subsequently underwent bilateral mastectomy 2019 with axillary node dissection.  She also underwent radiation therapy completed on 2020 and has been on adjuvant Femara since 2019.  She was able to achieve remission with this.  She then presented on 2021 showing some activity on a PET scan in the left side chest wall.  It was appearing to abut the lower aspect of her heart.  Kisquali was initiated.  She was started on magnesium with concerns of QTC prolongation that can be associated with this agent.  On 2021 tumor marker CA 15-3 had dropped resolution of the epicardial lymphadenopathy was noted by CT imaging.  Liver function tests were elevated and Kisquali  and then Femara were discontinued being seen by GI.  She has been treated with Aromasin.    In 2019 she had an echocardiogram in the setting of syncope echocardiogram that showed an ejection fraction of 60% with moderately dilated left atrium and negative saline injection.  I do not see any strain measurements.  Stress perfusion study was negative for ischemia.  Monitor showed few episodes of atrial tachycardia that were quite brief.  There is no residual malignancy,  She then received radiation therapy to bilateral chest 10/19-.  With findings of mass  near the pericardium follow-up echocardiogram was performed on 6/2021 that showed an ejection fraction 57% with GLS -19.9% with normal wall thickness trivial valve regurgitation no pericardial mass was appreciated.  And 6/2022 she was admitted with infectious arthritis discitis and myositis.  She was treated with antibiotics.  While in hospital she developed new onset atrial fibrillation with rapid rates.  Echo showed an ejection fraction of 55% with indeterminate diastolic function small patent foramen ovale with mild pulmonary hypertension with an RV systolic pressure 41 mmHg.  She was placed on Eliquis and metoprolol.  By 9/2022 she underwent synchronized cardioversion to sinus rhythm by Dr Gustafson.  She was seen in follow-up by EP service and while [id number] score is 1 with a history of carcinoma it was felt that her stroke risk was higher and Eliquis was continued    Since last visit she is active around her home but certainly more sedentary than she was a year ago.  She wishes to be more active.  She denies any chest pain shortness of breath palpitations syncope near syncope she occasionally has edema of the left arm with history of lymphedema.    Blood work 10/2020 includes normal CMP, CBC unremarkable    Past Medical History:   Diagnosis Date   • Anemia in neoplastic disease    • Arthritis    • Arthritis of back    • Arthritis of neck    • Breast cancer (HCC)     Left   • CVA (cerebral vascular accident) (HCC)    • Fracture, fibula    • Fracture, finger    • Frozen shoulder    • Hip arthrosis 01/17   • Hip pain     RIGHT HIP... CYST   • History of fracture of leg 1987   • History of radiation therapy     LAST TREATMENT     • Hypertension    • Knee swelling    • Limited joint range of motion (ROM)     RIGHT HIP   • Low back strain    • Periarthritis of shoulder    • Rotator cuff syndrome 6/22   • Skin sore     OPEN SORE LEFT BREAST   • Syncope    • Vertigo      Past Surgical History:   Procedure  Laterality Date   • AXILLARY LYMPH NODE BIOPSY/EXCISION Right     LYMPH NODE UNDER RIGHT ARM-MALIGNANT (DOUBLE MASTECTOMY)   • BREAST BIOPSY Left     MALIGNANT   • FRACTURE SURGERY  1987    Leg   • HARDWARE REMOVAL     • INCISION AND DRAINAGE LEG Left 06/30/2022    Procedure: INCISION AND DRAINAGE left ankle;  Surgeon: Kenneth Tran MD;  Location: [facility] OR;  Service: Orthopedics;  Laterality: Left;   • JOINT REPLACEMENT  mar 2020,july 2021    hips   • MASTECTOMY W/ SENTINEL NODE BIOPSY Bilateral 09/16/2019    Procedure: BILATERLA MODIFIED RADICAL MASTECTOMY WITH BILATERAL SENTINEL LYMPH NODE BIOPSY;  Surgeon: Joby Barron Jr., MD;  Location: [facility] OR;  Service: General   • TOTAL HIP ARTHROPLASTY Right 03/02/2020    Procedure: RIGHT TOTAL HIP ARTHROPLASTY NATALY NAVIGATION;  Surgeon: Luis M Leonard MD;  Location: [facility] OR;  Service: Orthopedics;  Laterality: Right;   • TOTAL HIP ARTHROPLASTY Left 07/20/2021    Procedure: Posterior LEFT TOTAL HIP ARTHROPLASTY NATALY NAVIGATION;  Surgeon: Luis M Leonard MD;  Location: [facility] OR;  Service: Orthopedics;  Laterality: Left;   • VENOUS ACCESS DEVICE (PORT) INSERTION Right 02/01/2019    Procedure: INSERTION VENOUS ACCESS DEVICE;  Surgeon: Joby Barron Jr., MD;  Location: [facility];  Service: General   • VENOUS ACCESS DEVICE (PORT) REMOVAL N/A 10/30/2019    Procedure: Mediport Removal;  Surgeon: Joby Barron Jr., MD;  Location: [facility] OR;  Service: General     Outpatient Medications Prior to Visit   Medication Sig Dispense Refill   • acetaminophen (TYLENOL) 325 MG tablet Take 650 mg by mouth Every 6 (Six) Hours As Needed.     • apixaban (ELIQUIS) 5 MG tablet tablet Take 1 tablet by mouth Every 12 (Twelve) Hours. Indications: Atrial Fibrillation 60 tablet    • Cholecalciferol 250 MCG (31208 UT) tablet Take 1 tablet by mouth. As directed     • Diclofenac Sodium (VOLTAREN) 1 % gel gel Apply 4 g topically to the appropriate area  as directed 4 (Four) Times a Day As Needed. RARE PRN     • exemestane (AROMASIN) 25 MG chemo tablet TAKE ONE TABLET BY MOUTH DAILY 30 tablet 3   • metoprolol succinate XL (TOPROL-XL) 50 MG 24 hr tablet Take 1 tablet by mouth 2 (Two) Times a Day for 180 days. 60 tablet 5   • polyethylene glycol (polyethylene glycol) 17 g packet Take 17 g by mouth 2 (Two) Times a Day. (Patient taking differently: Take 17 g by mouth 2 (Two) Times a Day As Needed.)     • sodium chloride 0.65 % nasal spray   1 Spray, Nasal, Drops, Q4H, PRN Nasal Congestion, 0 Refill(s)     • tiZANidine (ZANAFLEX) 4 MG tablet Take 1 tablet by mouth Every 6 (Six) Hours As Needed for Muscle Spasms.     • famotidine (PEPCID) 20 MG tablet Take 1 tablet by mouth 2 (Two) Times a Day Before Meals.     • QUEtiapine (SEROquel) 25 MG tablet Take 1 tablet by mouth Every Night. (Patient taking differently: Take 1 tablet by mouth Every Night. HAVEN'T STARTED YET) 30 tablet 2     Facility-Administered Medications Prior to Visit   Medication Dose Route Frequency Provider Last Rate Last Admin   • Chlorhexidine Gluconate Cloth 2 % pads 1 each  1 each Apply externally Take As Directed Luis M Leonard MD           Allergies as of 12/06/2022 - Reviewed 12/06/2022   Allergen Reaction Noted   • Benadryl [diphenhydramine] Itching 02/18/2020   • Erythromycin GI Intolerance 01/17/2019   • Levaquin [levofloxacin] GI Intolerance 03/07/2019   • Penicillins GI Intolerance 01/17/2019     Social History     Socioeconomic History   • Marital status:      Spouse name: Cruz   • Number of children: 0   Tobacco Use   • Smoking status: Never   • Smokeless tobacco: Never   Vaping Use   • Vaping Use: Never used   Substance and Sexual Activity   • Alcohol use: Yes     Comment: occasional   • Drug use: No   • Sexual activity: Not Currently     Partners: Male     Birth control/protection: Abstinence     Family History   Problem Relation Age of Onset   • Lung cancer Father    • Irritable  "bowel syndrome Father    • Cancer Mother    • Breast cancer Mother    • Liver disease Mother    • Breast cancer Sister 48   • Breast cancer Maternal Grandmother    • Breast cancer Paternal Grandmother    • Breast cancer Maternal Uncle    • Malig Hyperthermia Neg Hx      Review of Systems   Constitutional: Negative for chills, fever, weight gain and weight loss.   Cardiovascular: Negative for leg swelling.   Respiratory: Negative for cough, snoring and wheezing.    Hematologic/Lymphatic: Negative for bleeding problem. Does not bruise/bleed easily.   Skin: Negative for color change.   Musculoskeletal: Negative for falls, joint pain and myalgias.   Gastrointestinal: Negative for melena.   Genitourinary: Negative for hematuria.   Neurological: Negative for excessive daytime sleepiness.   Psychiatric/Behavioral: Negative for depression. The patient is not nervous/anxious.    All other systems reviewed and are negative.       Objective:     Vitals:    12/06/22 1221   BP: 110/70   Pulse: 60   Weight: 71.7 kg (158 lb)   Height: 167.6 cm (66\")     Body mass index is 25.5 kg/m².    Vitals reviewed.   Constitutional:       Appearance: Well-developed.   Eyes:      General: No scleral icterus.        Right eye: No discharge.      Conjunctiva/sclera: Conjunctivae normal.      Pupils: Pupils are equal, round, and reactive to light.   HENT:      Head: Normocephalic.      Nose: Nose normal.   Neck:      Thyroid: No thyromegaly.      Vascular: No JVD.   Pulmonary:      Effort: Pulmonary effort is normal. No respiratory distress.      Breath sounds: Normal breath sounds. No wheezing. No rales.   Cardiovascular:      Normal rate. Regular rhythm. Normal S1.      Murmurs: There is no murmur.      No gallop.   Pulses:     Intact distal pulses.   Edema:     Peripheral edema absent.   Abdominal:      General: Bowel sounds are normal. There is no distension.      Palpations: Abdomen is soft.      Tenderness: There is no abdominal tenderness. " There is no rebound.   Musculoskeletal: Normal range of motion.         General: No tenderness.      Cervical back: Normal range of motion and neck supple. Skin:     General: Skin is warm and dry.      Findings: No erythema or rash.   Neurological:      Mental Status: Alert and oriented to person, place, and time.   Psychiatric:         Behavior: Behavior normal.         Thought Content: Thought content normal.         Judgment: Judgment normal.       Lab Review:     ECG 12 Lead    Date/Time: 12/6/2022 12:22 PM  Performed by: Danni Odell MD  Authorized by: Danni Odell MD   Comparison: compared with previous ECG   Similar to previous ECG  Rhythm: sinus rhythm  Other findings: non-specific ST-T wave changes    Clinical impression: abnormal EKG          Assessment:       Diagnosis Plan   1. Malignant neoplasm of overlapping sites of left breast in female, estrogen receptor positive (HCC)  ECG 12 Lead    Adult Transthoracic Echo Complete W/ Cont if Necessary Per Protocol      2. Pulmonary hypertension (HCC)  ECG 12 Lead    Adult Transthoracic Echo Complete W/ Cont if Necessary Per Protocol      3. Persistent atrial fibrillation (HCC)        4. Primary hypertension        5. Malignant neoplasm of overlapping sites of both breasts in female, estrogen receptor positive (HCC)          Plan:       1.  Paroxysmal atrial fibrillation, status post electrical cardioversion to sinus rhythm.  Maintaining sinus rhythm and tolerating Eliquis and metoprolol.  Patient to see Dr Gustafson in 6 months.  There has been some prior discussions about need for long-term anticoagulation.  I told her her brother who is in attendance that her chads Vascor will increase as she gets older.  However this did occur in the setting of an infectious process and may be possible to consider possible discontinuation of long-term Eliquis therapy but with some uncertain risk.  I did recommend they consider apple watch or smart phone watch telemetry closely  "for atrial fibrillation.  They will try to do this before their appointment with Dr. Gustafson.  2.  Metastatic breast cancer.  Felt to be stable and now on Aromasin  3.  Abnormal EKG. No ischemia on prior stress test 8/2019 and no current anginal symptoms.  Ejection fraction normal on echo 6/27/2022.  4.  Pericardial mass.  Resolved on chemotherapy.  And being followed by serial imaging by Dr. Dixon.  5.  Hypertension well controlled  6.  Pulmonary hypertension.  Noted on echo 6/2022 with RVSP 41 mmHg.  Recheck echo  7.   Elevated liver function tests.  She had a biopsy and is being followed by Dr. Kirkpatrick.   8.  Arthritis, septic.  Just completed IV ceftriaxone for 6 weeks.  Is avoiding nonsteroidals though ideally for her would like to go back to using that she cannot do so in the setting of Eliquis use    STOP-Bang Score  Have you been diagnosed with Sleep Apnea?: no  Snoring?: no  Tired?: no  Observed?: no  Pressure?: yes  Stop Score: 1  Body Mass Index more than 35 kg/m2?: no  Age older than 50 year old?: yes  Neck large? \">17\"/43cm-M, >16\"/41cm-F: no  Gender=Male?: no  Total Stop-Bang Score: 2    Time Spent: I spent 35 minutes caring for Marylu on this date of service. This time includes time spent by me in the following activities: preparing for the visit, reviewing tests, obtaining and/or reviewing a separately obtained history, performing a medically appropriate examination and/or evaluation, counseling and educating the patient/family/caregiver, documenting information in the medical record and independently interpreting results and communicating that information with the patient/family/caregiver.   I spent 1 minutes on the separately reported service of ECG. This time is not included in the time used to support the E/M service also reported today.        Your medication list          Accurate as of December 6, 2022 11:59 PM. If you have any questions, ask your nurse or doctor.            CHANGE how you " take these medications      Instructions Last Dose Given Next Dose Due   polyethylene glycol 17 g packet  Commonly known as: MIRALAX  What changed:   · when to take this  · reasons to take this      Take 17 g by mouth 2 (Two) Times a Day.          CONTINUE taking these medications      Instructions Last Dose Given Next Dose Due   acetaminophen 325 MG tablet  Commonly known as: TYLENOL      Take 650 mg by mouth Every 6 (Six) Hours As Needed.       apixaban 5 MG tablet tablet  Commonly known as: ELIQUIS      Take 1 tablet by mouth Every 12 (Twelve) Hours. Indications: Atrial Fibrillation       Cholecalciferol 250 MCG (36254 UT) tablet      Take 1 tablet by mouth. As directed       Diclofenac Sodium 1 % gel gel  Commonly known as: VOLTAREN      Apply 4 g topically to the appropriate area as directed 4 (Four) Times a Day As Needed. RARE PRN       exemestane 25 MG chemo tablet  Commonly known as: AROMASIN      TAKE ONE TABLET BY MOUTH DAILY       metoprolol succinate XL 50 MG 24 hr tablet  Commonly known as: TOPROL-XL      Take 1 tablet by mouth 2 (Two) Times a Day for 180 days.       sodium chloride 0.65 % nasal spray      1 Spray, Nasal, Drops, Q4H, PRN Nasal Congestion, 0 Refill(s)       tiZANidine 4 MG tablet  Commonly known as: ZANAFLEX      Take 1 tablet by mouth Every 6 (Six) Hours As Needed for Muscle Spasms.              Patient is no longer taking -.  I corrected the med list to reflect this.  I did not stop these medications.      Dictated utilizing Dragon dictation

## 2022-12-14 NOTE — PROGRESS NOTES
Left message for Jeannine saying we got a message from her but nit really sure what is it all about.Asked to call back to clarify.   Patient is seen and examined.    She continues to make reasonable progress over left index finger MCP joint.  There is mild diffuse swelling with minimal pain. Range of motion is moderately limited.    I recommend continue observation and work on home exercise program.  There is no plan for any further intervention or treatment.    Will sign off for now.  My cell phone number is [phone].  Please call for any questions.

## 2022-12-28 ENCOUNTER — OFFICE VISIT (OUTPATIENT)
Dept: ONCOLOGY | Facility: CLINIC | Age: 62
End: 2022-12-28

## 2022-12-28 ENCOUNTER — LAB (OUTPATIENT)
Dept: LAB | Facility: HOSPITAL | Age: 62
End: 2022-12-28
Payer: COMMERCIAL

## 2022-12-28 VITALS
RESPIRATION RATE: 16 BRPM | BODY MASS INDEX: 25.15 KG/M2 | HEART RATE: 60 BPM | SYSTOLIC BLOOD PRESSURE: 115 MMHG | WEIGHT: 156.5 LBS | OXYGEN SATURATION: 100 % | DIASTOLIC BLOOD PRESSURE: 82 MMHG | HEIGHT: 66 IN | TEMPERATURE: 97.3 F

## 2022-12-28 DIAGNOSIS — I89.0 LYMPHEDEMA OF UPPER EXTREMITY, BILATERAL: ICD-10-CM

## 2022-12-28 DIAGNOSIS — C50.811 MALIGNANT NEOPLASM OF OVERLAPPING SITES OF BOTH BREASTS IN FEMALE, ESTROGEN RECEPTOR POSITIVE: ICD-10-CM

## 2022-12-28 DIAGNOSIS — Z17.0 MALIGNANT NEOPLASM OF OVERLAPPING SITES OF LEFT BREAST IN FEMALE, ESTROGEN RECEPTOR POSITIVE: Primary | ICD-10-CM

## 2022-12-28 DIAGNOSIS — I10 PRIMARY HYPERTENSION: ICD-10-CM

## 2022-12-28 DIAGNOSIS — K71.6 DRUG-INDUCED HEPATITIS: ICD-10-CM

## 2022-12-28 DIAGNOSIS — Z17.0 MALIGNANT NEOPLASM OF OVERLAPPING SITES OF BOTH BREASTS IN FEMALE, ESTROGEN RECEPTOR POSITIVE: ICD-10-CM

## 2022-12-28 DIAGNOSIS — C50.812 MALIGNANT NEOPLASM OF OVERLAPPING SITES OF LEFT BREAST IN FEMALE, ESTROGEN RECEPTOR POSITIVE: ICD-10-CM

## 2022-12-28 DIAGNOSIS — C50.812 MALIGNANT NEOPLASM OF OVERLAPPING SITES OF BOTH BREASTS IN FEMALE, ESTROGEN RECEPTOR POSITIVE: ICD-10-CM

## 2022-12-28 DIAGNOSIS — Z17.0 MALIGNANT NEOPLASM OF OVERLAPPING SITES OF LEFT BREAST IN FEMALE, ESTROGEN RECEPTOR POSITIVE: ICD-10-CM

## 2022-12-28 DIAGNOSIS — T50.905A DRUG-INDUCED HEPATITIS: ICD-10-CM

## 2022-12-28 DIAGNOSIS — C50.812 MALIGNANT NEOPLASM OF OVERLAPPING SITES OF LEFT BREAST IN FEMALE, ESTROGEN RECEPTOR POSITIVE: Primary | ICD-10-CM

## 2022-12-28 DIAGNOSIS — I48.0 PAROXYSMAL ATRIAL FIBRILLATION: ICD-10-CM

## 2022-12-28 LAB
ALBUMIN SERPL-MCNC: 4.6 G/DL (ref 3.5–5.2)
ALBUMIN/GLOB SERPL: 1.6 G/DL (ref 1.1–2.4)
ALP SERPL-CCNC: 109 U/L (ref 38–116)
ALT SERPL W P-5'-P-CCNC: 16 U/L (ref 0–33)
ANION GAP SERPL CALCULATED.3IONS-SCNC: 11.6 MMOL/L (ref 5–15)
AST SERPL-CCNC: 23 U/L (ref 0–32)
BASOPHILS # BLD AUTO: 0.04 10*3/MM3 (ref 0–0.2)
BASOPHILS NFR BLD AUTO: 1 % (ref 0–1.5)
BILIRUB SERPL-MCNC: 0.3 MG/DL (ref 0.2–1.2)
BUN SERPL-MCNC: 19 MG/DL (ref 6–20)
BUN/CREAT SERPL: 22.6 (ref 7.3–30)
CALCIUM SPEC-SCNC: 10.1 MG/DL (ref 8.5–10.2)
CANCER AG15-3 SERPL-ACNC: 18.3 U/ML
CHLORIDE SERPL-SCNC: 103 MMOL/L (ref 98–107)
CO2 SERPL-SCNC: 26.4 MMOL/L (ref 22–29)
CREAT SERPL-MCNC: 0.84 MG/DL (ref 0.6–1.1)
DEPRECATED RDW RBC AUTO: 51.9 FL (ref 37–54)
EGFRCR SERPLBLD CKD-EPI 2021: 77.7 ML/MIN/1.73
EOSINOPHIL # BLD AUTO: 0.12 10*3/MM3 (ref 0–0.4)
EOSINOPHIL NFR BLD AUTO: 2.9 % (ref 0.3–6.2)
ERYTHROCYTE [DISTWIDTH] IN BLOOD BY AUTOMATED COUNT: 14.4 % (ref 12.3–15.4)
GLOBULIN UR ELPH-MCNC: 2.8 GM/DL (ref 1.8–3.5)
GLUCOSE SERPL-MCNC: 100 MG/DL (ref 74–124)
HCT VFR BLD AUTO: 42.7 % (ref 34–46.6)
HGB BLD-MCNC: 13.3 G/DL (ref 12–15.9)
IMM GRANULOCYTES # BLD AUTO: 0.01 10*3/MM3 (ref 0–0.05)
IMM GRANULOCYTES NFR BLD AUTO: 0.2 % (ref 0–0.5)
LYMPHOCYTES # BLD AUTO: 1.32 10*3/MM3 (ref 0.7–3.1)
LYMPHOCYTES NFR BLD AUTO: 32.2 % (ref 19.6–45.3)
MCH RBC QN AUTO: 30.4 PG (ref 26.6–33)
MCHC RBC AUTO-ENTMCNC: 31.1 G/DL (ref 31.5–35.7)
MCV RBC AUTO: 97.5 FL (ref 79–97)
MONOCYTES # BLD AUTO: 0.31 10*3/MM3 (ref 0.1–0.9)
MONOCYTES NFR BLD AUTO: 7.6 % (ref 5–12)
NEUTROPHILS NFR BLD AUTO: 2.3 10*3/MM3 (ref 1.7–7)
NEUTROPHILS NFR BLD AUTO: 56.1 % (ref 42.7–76)
NRBC BLD AUTO-RTO: 0 /100 WBC (ref 0–0.2)
PLATELET # BLD AUTO: 200 10*3/MM3 (ref 140–450)
PMV BLD AUTO: 8.7 FL (ref 6–12)
POTASSIUM SERPL-SCNC: 4.3 MMOL/L (ref 3.5–4.7)
PROT SERPL-MCNC: 7.4 G/DL (ref 6.3–8)
RBC # BLD AUTO: 4.38 10*6/MM3 (ref 3.77–5.28)
SODIUM SERPL-SCNC: 141 MMOL/L (ref 134–145)
WBC NRBC COR # BLD: 4.1 10*3/MM3 (ref 3.4–10.8)

## 2022-12-28 PROCEDURE — 80053 COMPREHEN METABOLIC PANEL: CPT

## 2022-12-28 PROCEDURE — 36415 COLL VENOUS BLD VENIPUNCTURE: CPT

## 2022-12-28 PROCEDURE — 86300 IMMUNOASSAY TUMOR CA 15-3: CPT | Performed by: INTERNAL MEDICINE

## 2022-12-28 PROCEDURE — 85025 COMPLETE CBC W/AUTO DIFF WBC: CPT

## 2022-12-28 PROCEDURE — 99214 OFFICE O/P EST MOD 30 MIN: CPT | Performed by: INTERNAL MEDICINE

## 2022-12-28 NOTE — PROGRESS NOTES
Subjective     REASON FOR FOLLOW UP:  1. 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 R BREAST MASS WITH GIANT R AXILLARY ADENOPATHY.  SHE UNDERWENT DOSE DENSE AC, TAXOL, BILATERAL MASTECTOMIES, DRAMATIC NEAR COMPLETE VA, RADIATION THERAPY AND ADJUVANT FEMARA STOPPED ON 12/21 BECAUSE DRUG INDUCED HEPATITIS, SWITCHED TO AROMASIN 2/22: NO SIDE EFFECTS.    3. FAMILY HISTORY OF BREAST CANCER IN MOTHER AND SISTER, SISTER WAS TESTED FOR BRCA AND WAS NEGATIVE.    4. LEFT OVARIAN CYST , IT HAS BEEN PRESENT FOR LONG TIME BUT IS GETTING LARGER, ASYMPTOMATIC, NEED FOR , PELVIC US AND GYN ONC EVal: no need for any intervention                  5. LEFT HIP PAIN SEVERE ARTHRITIS, REAL NEED FOR LEFT HIP REPLACEMENT: PERFORMED 8/21    6. DRUG INDUCED HEPATITIS,  FINALLY STOPPED FEMARA: DRAMATIC IMPROVEMENT AND RESOLUTION.      On 12/28/2022 this 62-year-old female returns to the office for follow up of her previous history of bilateral breast cancer. She also has had an area of metastasis documented through PET scan in the right pericardial area that went away after induction of Kisqali for a few weeks. The patient used to be on Femara, this medicine triggered dramatic degree of hepatitis. We discontinued this medication and switched to Aromasin and since then she has not had any further issues or problems. Today the patient returns after being seen by Danni Odell MD, Cardiology, a few days ago in preparation for echocardiogram to evaluate pulmonary hypertension. She has not had any obvious clinical evidence of atrial fibrillation. She remains on anticoagulant with no clinical bleeding. No embolic phenomenon. Her appetite and weight are stable, her bowel function and urination are normal. 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 She denies any cough or respiratory symptoms including no colds or flu. No other sites of bone pain. Mentally she remains sharp and independent driving her car and doing anything that she pleases with no limitations.                 Past Medical History:   Diagnosis Date   • Anemia in neoplastic disease    • Arthritis    • Arthritis of back    • Arthritis of neck    • Breast cancer (HCC)     Left   • CVA (cerebral vascular accident) (HCC)    • Fracture, fibula    • Fracture, finger    • Frozen shoulder    • Hip arthrosis 01/17   • Hip pain     RIGHT HIP... CYST   • History of fracture of leg 1987   • History of radiation therapy     LAST TREATMENT     • Hypertension    • Knee swelling    • Limited joint range of motion (ROM)     RIGHT HIP   • Low back strain    • Periarthritis of shoulder    • Rotator cuff syndrome 6/22   • Skin sore     OPEN SORE LEFT BREAST   • Syncope    • Vertigo            Past Surgical History:   Procedure Laterality Date   • AXILLARY LYMPH NODE BIOPSY/EXCISION Right     LYMPH NODE UNDER RIGHT ARM-MALIGNANT (DOUBLE MASTECTOMY)   • BREAST BIOPSY Left     MALIGNANT   • FRACTURE SURGERY  1987    Leg   • HARDWARE REMOVAL     • INCISION AND DRAINAGE LEG Left 06/30/2022    Procedure: INCISION AND DRAINAGE left ankle;  Surgeon: Kenneth Tran MD;  Location: [facility];  Service: Orthopedics;  Laterality: Left;   • JOINT REPLACEMENT  mar 2020,july 2021    hips   • MASTECTOMY W/ SENTINEL NODE BIOPSY Bilateral 09/16/2019    Procedure: BILATERLA MODIFIED RADICAL MASTECTOMY WITH BILATERAL SENTINEL LYMPH NODE BIOPSY;  Surgeon: Joby Barron Jr., MD;  Location: [facility];  Service: General   • TOTAL HIP ARTHROPLASTY Right 03/02/2020    Procedure: RIGHT TOTAL HIP ARTHROPLASTY NATALY NAVIGATION;  Surgeon: Luis M Leonard MD;  Location: [facility];  Service: Orthopedics;  Laterality: Right;   • TOTAL HIP ARTHROPLASTY Left 07/20/2021    Procedure: Posterior LEFT TOTAL  HIP ARTHROPLASTY NATALY NAVIGATION;  Surgeon: Luis M Leonard MD;  Location: [facility] OR;  Service: Orthopedics;  Laterality: Left;   • VENOUS ACCESS DEVICE (PORT) INSERTION Right 02/01/2019    Procedure: INSERTION VENOUS ACCESS DEVICE;  Surgeon: Joby Barron Jr., MD;  Location: [facility];  Service: General   • VENOUS ACCESS DEVICE (PORT) REMOVAL N/A 10/30/2019    Procedure: Mediport Removal;  Surgeon: Joby Barron Jr., MD;  Location: [facility] OR;  Service: General        Current Outpatient Medications on File Prior to Visit   Medication Sig Dispense Refill   • acetaminophen (TYLENOL) 325 MG tablet Take 650 mg by mouth Every 6 (Six) Hours As Needed.     • apixaban (ELIQUIS) 5 MG tablet tablet Take 1 tablet by mouth Every 12 (Twelve) Hours. Indications: Atrial Fibrillation 60 tablet    • Cholecalciferol 250 MCG (01590 UT) tablet Take 1 tablet by mouth. As directed     • Diclofenac Sodium (VOLTAREN) 1 % gel gel Apply 4 g topically to the appropriate area as directed 4 (Four) Times a Day As Needed. RARE PRN     • exemestane (AROMASIN) 25 MG chemo tablet TAKE ONE TABLET BY MOUTH DAILY 30 tablet 3   • metoprolol succinate XL (TOPROL-XL) 50 MG 24 hr tablet Take 1 tablet by mouth 2 (Two) Times a Day for 180 days. 60 tablet 5   • polyethylene glycol (polyethylene glycol) 17 g packet Take 17 g by mouth 2 (Two) Times a Day. (Patient taking differently: Take 17 g by mouth 2 (Two) Times a Day As Needed.)     • sodium chloride 0.65 % nasal spray   1 Spray, Nasal, Drops, Q4H, PRN Nasal Congestion, 0 Refill(s)     • tiZANidine (ZANAFLEX) 4 MG tablet Take 1 tablet by mouth Every 6 (Six) Hours As Needed for Muscle Spasms.     • [DISCONTINUED] digoxin (LANOXIN) 125 MCG tablet Take 1 tablet by mouth Daily.       Current Facility-Administered Medications on File Prior to Visit   Medication Dose Route Frequency Provider Last Rate Last Admin   • Chlorhexidine Gluconate Cloth 2 % pads 1 each  1 each Apply externally Take  "As Directed Luis M Leonard MD            ALLERGIES:    Allergies   Allergen Reactions   • Benadryl [Diphenhydramine] Itching     INCREASES BLOOD PRESSURE   • Erythromycin GI Intolerance   • Levaquin [Levofloxacin] GI Intolerance   • Penicillins GI Intolerance        Social History     Socioeconomic History   • Marital status:      Spouse name: Cruz   • Number of children: 0   Tobacco Use   • Smoking status: Never   • Smokeless tobacco: Never   Vaping Use   • Vaping Use: Never used   Substance and Sexual Activity   • Alcohol use: Yes     Comment: occasional   • Drug use: No   • Sexual activity: Not Currently     Partners: Male     Birth control/protection: Abstinence        Family History   Problem Relation Age of Onset   • Lung cancer Father    • Irritable bowel syndrome Father    • Cancer Mother    • Breast cancer Mother    • Liver disease Mother    • Breast cancer Sister 48   • Breast cancer Maternal Grandmother    • Breast cancer Paternal Grandmother    • Breast cancer Maternal Uncle    • Malig Hyperthermia Neg Hx             Objective     Vitals:    12/28/22 1130   BP: 115/82   Pulse: 60   Resp: 16   Temp: 97.3 °F (36.3 °C)   TempSrc: Temporal   SpO2: 100%   Weight: 71 kg (156 lb 8 oz)   Height: 167.6 cm (65.98\")   PainSc: 0-No pain     Current Status 12/28/2022   ECOG score 0     Exam:                   GENERAL:  Well-developed, well-nourished  Patient  in no acute distress.   SKIN:  Warm, dry ,NO purpura ,no rash.  HEENT:  Pupils were equal and reactive to light and accomodation, conjunctivae noninjected, normal extraocular movements, normal visual acuity.   NECK:  Supple with good range of motion; no thyromegaly , no JVD or bruits,.No carotid artery pain, no carotid abnormal pulsation   LYMPHATICS:  No cervical, NO supraclavicular, NO axillary, NO inguinal adenopathies.  CARDIAC   normal rate , regular rhythm, without murmur,NO rubs NO S3 NO S4   LUNGS: normal breath sounds bilateral, no wheezing, " NO rhonchi, NO crackles ,NO rubs.Chest wall shows bilateral telangectasia's with bilateral mastectomy sites. She has resolution of all of her areas of metastasis documented at the time of the original diagnosis. She has no visible or palpable axillary adenopathies.       VASCULAR VENOUS: no cyanosis, NO collateral circulation, NO varicosities, NO edema, NO palpable cords, NO pain,NO erythema, NO pigmentation of the skin.  ABDOMEN:  Soft, NO pain,no hepatomegaly, no splenomegaly,no masses, no ascites, no collateral circulation,no distention.  EXTREMITIES  AND SPINE:  No clubbing, no cyanosis ,oa hands deformities , no pain .No kyphosis,  no pain in spine, no pain in ribs , no pain in pelvic bone.  NEUROLOGICAL:  Patient was awake, alert, oriented to time, person and place.                .            RECENT LABS:  Hematology WBC   Date Value Ref Range Status   12/28/2022 4.10 3.40 - 10.80 10*3/mm3 Final     RBC   Date Value Ref Range Status   12/28/2022 4.38 3.77 - 5.28 10*6/mm3 Final     Hemoglobin   Date Value Ref Range Status   12/28/2022 13.3 12.0 - 15.9 g/dL Final     Hematocrit   Date Value Ref Range Status   12/28/2022 42.7 34.0 - 46.6 % Final     Platelets   Date Value Ref Range Status   12/28/2022 200 140 - 450 10*3/mm3 Final                      Assessment & Plan    1.  History of least for the last 4 years, she has had an in crescendo mass in the left breast that has produced a gigantic tumor that WAS close to 25 cm in size and is replacing most of the anatomy of the left breast. There are areas of ulceration necrosis and bleeding and the mass is fixed to the chest wall. She has abundant amount of collateral circulation in the left anterior chest wall and it caught my attention the lack of any left axillary adenopathy. She has no lymphedema in the left upper extremity. In the right breast while in sitting position, no palpable abnormalities are documented. The right breast skin and nipple are normal. When  the patient lies flat, there is a palpable mass at 9 o'clock from the nipple that measures probably 4 cm in size, very indistinct in regard to edges and margins. There was no mobility and no areas of tenderness. She has a giant adenopathy in the right axilla that measures close to 9 cm in size, uniform, no fluctuation, nontender, completely fixed to the axillary wall. There is no compromise of the skin.     After completion of 4 cycles of AC patient has had very dramatic improvement in all sites of disease including right axilla and left breast.     Completed 4 cycles Adriamycin Cytoxan on 4/12/2019.     Patient started weekly Taxol treatments on 5/3/2019.   Completed 12 weekly Taxol treatments on 7/19/2019.   9/16/2019 bilateral mastectomy by Dr. Joby Barron with axillary lymph node dissection.  No residual malignancy.   10/30/2019 patient's Mediport was removed in anticipation of radiation.   Radiation therapy under the care of Dr. Marmolejo 10/31/2019-1/13/2020 to the right chest wall.   Started on adjuvant Femara 2.5 mg daily 8/20/2019.   9/21/2020 continues on adjuvant Femara, tolerating it quite well.  Some mild hot flashes and mild arthralgias, all which are tolerable.  • I advised the patient that at this point her breast cancer remains in remission. She is 100% compliant of her medication letrozole and her clinical examination remains negative normal for breast cancer recurrence.   • The patient was further reviewed on 04/16/2021. Her clinical examination is completely negative normal and her questioning is negative in regard to symptoms that would suggest breast cancer recurrence. She is 100% compliant with her Femara. Her white count, hemoglobin and platelets remain normal. Her tumor marker during the previous visit was normal and we have another one pending today CA 15-3.   • The patient was further reviewed on 05/17/2021 along with her brother. The clinical examination has not changed. The only new  complaint is the progressive amount of discomfort and the inability to function given the pain in the left hip area. Now she is limping. The only time when she is not in discomfort with the left hip is when she is sitting or lying in bed or riding the horse when gravity takes away the pressure of her left hip area. Radiologically speaking the CT scan of the chest, abdomen and pelvis to my eyes disclosed no abnormalities besides a very simple ovarian cyst that measures close to 7 x 5 cm with no trabeculation. There is no pelvic ascites. There is no pelvic adenopathy. The other abnormality obviously visible is the severe degree of scoliosis of the thoracic, lumbar spines.     It caught my attention the subchondral cyst in the left hip and the minimal if any space leftover between the head of the femur and the acetabulum. There is no metastasis in this anatomical site.     The radiologist mentioned cardiophrenic angle lymphadenopathy. I cannot see that from my naked eyes. I do not know what it is and I do not know how to express it. Pulmonary anatomy is normal. Hilar adenopathy is normal. No pericardial effusion. No pleural effusion. Minimal infiltrate in the lungs related to radiation changes. Liver anatomy, pancreas and kidneys were unremarkable. The scoliosis is very obvious in the view of the abdomen.     Her CA 15-3 was minimal elevated in comparison to previous assessment and it raises the following question.   1. Is the cardiophrenic node described by the radiologist indeed significant of breast cancer recurrence or not? The only way to know will be to do a PET scan. This will be scheduled.   2. She is known for having an ovarian cyst for a long time but now is getting bigger, 7 cm across, and has no TABICATION with no pelvic ascites. I do not believe that this is representation of malignancy; nevertheless, I am going to run a CA-125 on her and I will proceed with a pelvic ultrasound as well as a GYN oncology  referral for review.   3. I will send a notification to Dr. Leonard, the patient’s orthopedic surgeon, in regard to all her issues pertinent to the left hip. I think the patient is getting closer and closer to having a left hip replacement. 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 She recovered extremely well from her right hip surgery before.     The patient returned to the office on 05/24/2021. Since the previous visit the patient proceeded with a PET scan and I have reviewed this with her in the PAC system showing chest wall on the left side area of enlightening SUV activity that is almost 3 cm long x 7 mm wide. It is behind the bone. It is very close to the lower aspect of her heart. The reset of the PET scan discloses no activity. This is also specific in regard to the ovaries and actually an ultrasound of her vagina looking into the ovaries documented an unilocular cyst on the left side with typical features of benignity and a  was completely normal 5.5.     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 The rest of the PET scan is very much negative normal. I would not be surprised if this is an area of the internal mammary node chain.     Obviously the ovarian cyst is benign only locular with a normal  and I find no reason to refer her to GYN oncology anymore.     Therefore my advice to her is as follows:  1. She will remain on her letrozole 2.5 mg a day.  2. She will initiate Kisqali at the usual dose 3 weeks on 1 week off making her aware of the side effects of the medicine including leukopenia, nausea, vomiting, rash, diarrhea, abnormalities in the liver function test and neutropenic fever. She will take  the medicine at least for the next 6 months.  3. The patient will proceed with referral to radiation oncology to treat this area completely.  4. I am going to go ahead and make a referral to Cardiology in preparation for this site of radiation therapy and knowing that tangential fields will be difficult to produce, I think it will be important to have her to be seen by Cardiology in preparation of Kisqali use. Also I advised her that I would like for her to take a magnesium supplement and she needs to eat plenty of nuts to minimize hypomagnesemia that can help her to prolong QT interval that is the most important side effect of Kisqali to my eyes. I advised her to continue her blood pressure medication and I advised her also to have an EKG today and also have a repeated EKG upon return in 3 weeks.    5. The patient will be having formal education and consent for Kisqali and she knows that this medicine will come to her from a speciality pharmacy.   6. The patient will require to have a repeat PET scan at least 6-8 weeks after completion of her radiation therapy to the chest wall.   7. I advised her and her brother present on the telephone of all of these events and she was ready to proceed.     • The patient was further reviewed on 07/07/2021 after she has continued her Kisqali and completion of the 1st round of the medicine. Today her EKG disclosed a normal QT interval and this has been sent to Cardiology to be reported officially. 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 She has completed her radiation therapy to the epicardial right lymph node with no difficulties or side effects.     The thing that is bothering her the most is the pain in the left hip associated with advanced degenerative arthritis and she is limping substantially and having discomfort requiring to take pain medicine, Voltaren, on a regular schedule. She already has been scheduled to have her hip replaced in a  few days. In preparation for this I have advised the patient the following.   1. She will discontinue her aspirin and her nonsteroidal anti-inflammatory medications a week in advance for her surgery. This will minimize the potential for bleeding.   2. The patient will hold off on the Kisqali until I review her back in the office in 6 weeks. She will require a blood count done 3 days before the surgery at the same time that she will require her official COVID test before the surgical intervention.   3. The patient will remain on Femara for the time being at 2.5 mg a day. She has no need to stop this medication anytime besides the day of the surgery. She will resume this after the surgery and as soon as she is able to receive oral route.   4. Eventually the patient will require radiological assessment upon review in order to see what happened to the epicardial lymph node.           • The patient was further reviewed on 09/30/2021. She has recovered further from the left hip surgery but she is not doing physical therapy anymore. She has a minimal limp and I advised her to go back on physical therapy on her own at home. She knows how to do the exercises and she has the afternoon off every single day.    The patient completed her Kisqali a week ago. Her white count is low today. The hemoglobin is stable. The platelet count is stable. She has had issues with the consecution of the Kisqali but the pharmacists have been working on this process with  her specialty pharmacy.     Today it caught my attention the significant bump in her liver function test, SGOT, SGPT. The patient is not drinking any alcohol. She is not taking any cholesterol medicine. She is not taking any anti-inflammatory medication and leads me to think that Kisqali is the culprit of this. Given these findings we advised the patient to put the Kisqali on hold until we recheck chemistry profile on weekly basis for the next 3 or 4 weeks. We need to settle  these numbers down before she continues the Kisqali. She probably will require dose adjustment at some point. Again, her hepatitis A, B and C serologies are negative.     Instructed pharmacist to discuss these issues with her as well.     She will be called at home with the report of her CA 15-3.     I encouraged her to remain on her Femara though.     We provided her blood pressure medication. She will remain on this for the time being.     • 1. In regard to her history of breast cancer she was reviewed on 11/19/2021. Since the previous visit the patient has had tumor marker CA 15-3 that has dropped to 20. Radiological assessment of her chest and abdomen CT scan that documents resolution of the epicardial lymphadenopathy in the right hemithorax, no pulmonary nodules or metastasis, no pleural effusion and no liver metastasis. No bone metastasis. Based on this information I do believe that the breast cancer is relatively quiet clinically, biochemically and radiologically and I advised her today to resume her Femara at the dose of 2.5 mg a day. The likelihood of Femara producing liver dysfunction is more than remote.   2. This patient has developed very significant abnormalities in her liver function test with persistent elevation of the SGOT, SGPT and alkaline phosphatase and normal bilirubin. 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 I have tested a multitude of liver issues including hepatitis A, B and C serologies that were negative, antitrypsin 1 that was normal, antimicrosomal antibodies that was normal, and AARON. Anti-celiac sprue panel also was done, ferritin level, copper also were done looking for Stephen's disease and hemochromatosis. All of these conditions are basically ruled out. I even went ahead and scheduled her to have a liver biopsy that documents according to the pathologist inflammatory process in the liver consistent with drug effect.    We have discontinued the Kisqali 2 months ago  thinking that that was the culprit but the numbers have not improved, then we discontinued most of the other supplemental medicines that she was taking, this led the numbers to improve and the only thing that she is left taking is Voltaren. She takes the Voltaren for her arthritis. I advised her on 11/19/2021 that she must discontinue the Voltaren and hopefully this will be making her numbers in regard to liver dysfunction to improve.     We are sure that this patient is not drinking alcohol at all.    We reviewed her medication list today and the only thing that she is taking is metoprolol and vitamin D. We asked her to remain on these medicines.     I even went ahead a couple of weeks ago to put her on a low dose prednisone to see if this had any benefit in her liver function test and it has not. I asked her today to discontinue this medicine as well.     I insisted today that the most likely reason why her liver function test is abnormal after all of the reviewing that we have done and also reviewing an alpha fetoprotein that was negative normal is drug effect. The only medicine that is leftover is antiinflammatory medication. Metoprolol doing this is kind of unexpected and she does not take Tylenol in enough amount to trigger this abnormality neither. She raised the question if she could take a Tylenol here and there and I think for now it will be okay but she will need to deal with her arthritic symptoms in a topical way or physical therapy way, acupuncture or some other methodology.     I would like for her to come to the office every couple of weeks to do a CBC and a CMP and nurse visit. I will review her back in 6 weeks with a CBC, CMP and CA 15-3. I expect that if the Voltaren is the culprit her liver function test should be normal in 6 weeks.     I will communicate all of these issues to Dar Kirkpatrick MD, who has seen her before. I do not know if he will agree with my assessment but he is welcome to  pitch in with any other objection or idea.      I discussed all of these facts with the patient and her brother in the room. I went piece by piece and item by item over all of these issues and significance of each one of them. They understood this clearly.   • The patient was further reviewed on 12/29/2021. Recent review of her liver function tests a few days ago documented persistent elevation of her SGOT, SGPT, and alkaline phosphatase. She has discontinued most of the medicines and I have no other choice but discontinue the Femara. Since then and actually today is the 1st day in many weeks that the SGOT and SGPT and alkaline phosphatase are improving. The patient actually remains asymptomatic in this regard. Her liver is not enlarged. She has no jaundice. She has no rash, no skin lesions and no other new alterations. That is good news. Her white blood count, hemoglobin and platelets remain stable. 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 That is extremely rare. As I posted above, I discussed with all my partners in regard to how many times through their careers prescribing Femara to multiple breast cancers they have seen Femara triggering hepatitis, none of them gave me a positive answer. I reviewed this information in [employer] and it is reported in less than 1% of patients taking this medication. I think we are in front of a rare situation but I want for her to withhold any further Femara treatment for the time being and I want to review her back in 3 weeks. If the liver function tests continue improving or normalize by then maybe we will have the answer once and for all. Raises the question what we will do next and I think the next medicine will be the utilization of Aromasin that has a different chemical component and different methodology for action. I made her  aware of that. We are not going to go back in giving her Kisqali under the present circumstances given the confusion and all the issues pertinent to her liver dysfunction.     She already has been seen by Cardiology with no new issues or commentaries by Dr. Odell and her note has been reviewed today. Actually her blood pressure today looks terrific. Her pulse is normal. Her weight is stable. Her metoprolol is still ongoing in a minimal dose.     Therefore, she will return with a CBC, CMP stat in 3 weeks and further review at that point.  • She was further reviewed on 01/25/2022. Her clinical examination is negative for metastasis, her liver is not enlarged and she has no jaundice. She discontinued her Femara close to 6 weeks ago and I am expecting to review her liver function test today. Also we requested tumor markers. They have been within normal limits.     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 She is advised to undergo therapy with Aromasin at the dose of 25 mg a day. She will require to be reassessed in 1 month.     We are not going to reestablish Kisqali therapy until we are sure that her liver is going to be able to handle all of the medications at this point.    In regard to her blood pressure control we are going to send prescription to her pharmacy to have a year of refill on this medication. If the patient needs to go back on aspirin or not remains to be seen until we review her liver function test as well. She remains on her vitamin D supplement and she is taking minimal amount of Tylenol for pain.     I will review her back in a month with a CBC, CMP and a CA 15-3.     I discussed all of these facts with the patient and her brother present in the room.   • The patient was further reviewed on 03/07/2022. Since the previous visit the patient has stopped the Femara in 12/2021 and today her liver function tests are completely back to normality. This proves the point that  Femara was the culprit phenomenon that I never have seen and discussed with my partner, Danelle Cespedes MD. He never has seen this neither.     In any event the patient's liver function tests are back to normal today. The patient is tolerating her Aromasin extremely well with the exception of hot flashes but otherwise no other new issues. We have her CA 15-3 pending today. This will be discussed with her once that it becomes available. So far we have not documented any radiological or clinical progression of her breast cancer and she will remain again on the Aromasin for the time being.  I am not planning to add any Kisqali to her treatment at this point unless that the tumor marker has a dramatic increase.     In regard to her hypertension the patient will remain on her metoprolol that she has been taking for the time being. In regard to her previous history of stroke she will remain on a baby aspirin.     In regard to pain management for arthritis I asked her to remain on Tylenol to minimize the use of antiinflammatory medication that could have a lot of issues in regard to side effects. She will be allowed to use an antiinflammatory medication like Aleve, ibuprofen, just very much on prn basis very sporadically.     I will review her back in 2 months with a CBC, CMP and a CA 15-3.    Otherwise I am not planning to have any other intervention or radiological analysis unless that again the tumor marker shows significant modification.     I discussed all of these facts with the patient and her daughter present in the room.      • The patient returned on 05/09/2022 with no symptoms of anything in particular besides minimal respiratory allergies and pain in the left hip associated with her previous surgery. Her clinical examination today is very much unremarkable, she looks fantastic. She has no symptoms or signs of breast cancer recurrence. White count, hemoglobin and platelets are normal. Her tumor marker CA 15-3  has remained normal and actually lower than ever and compliance with Aromasin has been 100% with excellent tolerance.     Given the circumstances of this I advised her to remain on Aromasin 25 mg a day and I find no use for this patient to go back onto Kisqali or medicines of this nature.     From the point of view of her abnormal liver function abnormalities associated with Femara utilization her chemistry profile today is completely normal including SGOT, SGPT, bilirubin, alkaline phosphatase. On clinical grounds her liver is not enlarged. It is impressive to see how much hepatitis she got out of Femara and how quickly she got better after stopping the Femara. This is extremely unusual but it happened.    I notified the patient of this good finding and obviously she will never take Femara again.    I will review her back in 6-8 weeks to continue monitoring the clinical situation along with a CBC, CMP and CA 15-3.     Finally I encouraged her to remain on her aspirin and be sure that she takes her blood pressure medication. I provide these medicines for her.    Also Toprol will be continued and gabapentin for hot flashes or insomnia could be utilized and could be utilized as well for pain.    I discussed all of these facts with the patient and her brother in the room.   On 08/16/2022 we reviewed the patient in regard to her history of breast cancer. Clinically she has not had any obvious recurrent disease in the chest wall in the lung anatomy or abdomen or pelvis. She remains on Aromasin adjuvantly and we are waiting to review tumor marker. She has a chest wall that is completely flat due to previous mastectomies and radiation therapy. There is no axillary adenopathies and the patient has in my opinion control of her disease process as far as we can tell. I advised her to remain on Aromasin 25 mg a day. I went ahead and scheduled a visit with us in a few weeks in order to further review radiological analysis that  will be discussed below.  • On 09/14/2022 the patient has had in the interim a CT scan of the chest, abdomen and pelvis for review personally. Her pulmonary anatomy remains normal, there is no pulmonary congestion, pleural effusions, pericardial effusion, cardiomegaly, hilar or mediastinal adenopathy. There is prepericardiac lymph node measuring almost 1.5 to 2 cm in size that is flat like a small finger that has been present before and has minimally enlarged. If this has anything to do with her previous history of breast cancer is difficult to state but this has been evaluated before with similarity in regards size and some degree of fluctuation. I think this does not constrain me from thinking with a normal tumor marker of CA 15-3 of 20 during the previous visit or compel to perform either removal or biopsy at this time or proceed with radiological assessment with a PET scan. I do believe that this is not representation of anything in particular. Her liver anatomy and the rest of the bony anatomy, pancreas, kidneys, spleen and retroperitoneum remain unchanged. Given this finding I advised the patient to proceed and continue her Aromasin without the need to add any other medication to her regimen of medication especially in the background of A-fib. I advised her to proceed with reassessment with me in 6 weeks with a repeat CBC, CMP and CA 15-3. In that moment also we will obtain a liquid biopsy for further analysis.   •   • Her liver function test has remained normal and Aromasin has not produced any chemical hepatitis. I advised her again to remain on this medicine by itself.   On 10/25/2022 the patient remains on Aromasin. She has no symptoms or signs that would indicate breast cancer recurrence and the latest tumor marker CA 15-3 was 20 two months ago. We have another one pending today. Given these circumstances I advised her to remain on Aromasin for the time being. I find no need for radiological assessment at  this time. She will be called at home with the report of her tumor marker.   On 12/28/2022 the patient had no symptoms or signs of breast cancer progression or recurrence on any level. Tolerance to Aromasin is excellent with no side effects and she has 100% compliance on the medication. She was advised to remain on the medication. Her tumor marker CA 15-3 has remained normal. No plans for radiological assessment unless new clinical changes occur or tumor marker changes.       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 Her liver enzymes are completely normal today. She remains on Aromasin and obviously this patient never will be back on letrozole under any circumstances. This was documented through liver biopsy and through removal of the medication that triggered quick improvement in her liver function test.  •  On 09/14/2022 there is no evidence of drug induced hepatitis. Aromasin will be continued. The patient is aware that she never will be able to take Femara.   On 10/25/2022 her liver enzymes are completely normal today. Since discontinuation of Femara her drug induced hepatitis has resolved. This situation was extremely rare.   ON 12/28/2022 no issues pertinent to liver function after discontinuation of Femara months ago.       •   ·   3.The patient has developed an episode of septic arthritis in many joints including left shoulder and left ankle. She required antibiotic therapy as per recently. Infectious Disease was involved in this. In fact I discussed the case with them on the telephone. I suggested choosing the PICC line to be placed on the right arm in order to be able to deliver antibiotic therapy according to the proper indication. She completed the PICC line and it was removed last week with no complications or problems. The right upper extremity is completely normal. It is difficult for me to know why she developed the septic arthritis. She is in contact with horses all the  time. The pathogen that she had in the joint is very uncommon in my opinion and the patient had no obvious source including no sinuses, no dental source, no obvious cardiac source, no gastrointestinal or genitourinary source and no skin source. It raises the question if this pathogen could evade to colonic source and I think I feel the obligation for this patient to have at least a CT scan of the abdomen and pelvis and eventually in several months from now consider a colonoscopy. Another consideration could be a Cologuard in some way. At least the patient’s infection seems to be under control. Her joints are still sore today including left shoulder, left ankle. Local inflammatory signs are very much gone. The only area of inflammation that she has in the 1st MP joint on the left hand probably represents osteoarthritis, no more than that. She will remain in observation from this point of view.  On 09/14/2022 the patient has no symptoms that would suggest any further spreading or new development of septic arthritis. My fear was related to the possible seeding of her hip replacement areas by bacteria during the bacteremia that she had not too long ago. It seems that that is not the case. Radiologically speaking I do not see any local inflammation or reaction in any of these anatomical sites. There is perfect symmetry in the hip areas in regard to all her hardware material. Her sedimentation rate is BACK TO normal. Her white count is normal and this process will be watched in absence of any other intervention.  On 10/25/2022 no new episodes of septic arthritis. I measured her sedimentation rate during the previous visit that was down to 9, previously was in the 8-9 category. This means resolution of an inflammatory process altogether.   On 12/28/2022 at this time she is experiencing her typical symptom of osteoarthritis in her hands and hips but no issues pertinent to septic arthritis whatsoever. Deformities in the  hands are ongoing due to osteoarthritis with no other issues or problems. No lymphedema in upper extremities.       •   ·   4.The patient developed atrial fibrillation with rapid ventricular response during the hospitalization with septic arthritis. Echocardiogram documented very dilated left atrium. She is now receiving Eliquis, metoprolol, and digoxin. Her ventricular response is controlled today but she remains in AFib. She has requested a followup with Dr. Odell and I went ahead and made her an appointment. 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 If she had a fall and she is taking anticoagulants the only thing that we are going to have is just trouble. She has sold one of the horses. She will sell the other horse very soon and she will remain on land for the time being. Her  is back in town and he is helping her with consecution of groceries and things of this nature under the present circumstances. I advised her to minimize any trauma.  On 09/14/2022 the patient will be seen by Electrophysiology tomorrow in regard to consideration of cardioversion for her AFib. She has discussed this with Dr. Odell and I have reviewed this data. That is perfectly fine from my point of view. I find no form of contraindication from my side of the story if this is the methodology that is chosen to try to revert her to normal sinus rhythm.  On 10/25/2022 the patient is in normal sinus rhythm today. She remains on anticoagulants. She has cardiology appointment tomorrow. I asked her to buy a pulse oximeter and I taught her how to interpret the little wave curve of the pulse oximeter in regard to her heart rate. Typically patients in A-fib have very irregular pulse chaotic and the little wave curves will be continuously changing and besides the pulse rate will be continuously changing depending on the speed of the process. That is very  simple to interpret. If se develops that problem I advised her to call her cardiologist.   On 12/28/2022 today she is in normal sinus rhythm by cardiac auscultation. Echocardiogram to be done by Danni Odell MD, in the next few days and further recommendations at that point. We strongly advised the patient about the continuation of Eliquis.       •   ·   5.The patient has a grade 3 lymphedema in the left upper extremity so tight in the sleeve that she had them done before is not even fitting. She has no obvious infection as far as I can tell. I went and made an urgent referral to the Lymphedema Clinic today to see if further bandage and drainage of this and massage would be helpful to her in the long run to control the problem. I still advised her to be extremely careful in regard to any injury or lesions in the skin of the left upper extremity to minimize any potentiality of cellulitis. In the long run if she has any episodes she will need to be back on antibiotics right away.  •  On 09/14/2022 the patient's lymphedema in the left upper extremity continues. She already has an appointment to be seen by the lymphedema clinic, she will require a new sleeve and massage and interventional therapy for this. She is aware that she needs to avoid any trauma in the left upper extremity to minimize any cellulitis. I strongly believe as posted before that septic arthritis is part of her lymphedema issues.   On 10/25/2022 her lymphedema in the left upper extremity is very much resolved with the sleeve and all of the manipulation that she has had in the lymphedema clinic. I advised her to be very prudent in regard doing any nicking in the skin and damaging the skin pertinent to the left upper extremity to minimize cellulitis and potentiality for further problems.   On 12/28/2022 no lymphedema in either extremity. No need for sleeve use at this time.       •   ·   6.In regard to hypertension management I have controlled this before.  Now she is taking quite amount of metoprolol. I will give up the management of this and now that she will see Dr. Odell, they will provide the care of this issue. I would not be surprised if the patient in the long run needs to be receiving another blood pressure medication given the fact that her blood pressure is still marginally elevated today. Taking digoxin, I do not think Lasix or hydrochlorothiazide will be a good choice because of the potential for hypokalemia unless the potassium is replaced and monitored very close. This will probably minimize the potentiality for digoxin toxicity.  • On 09/14/2022 her blood pressure is controlled with the metoprolol that is provided by the cardiology team. She was advised again to avoid antiinflammatory medication for pain control.   On 10/25/2022 her blood pressure is under good control and I advised her to continue taking her blood pressure medication and once more I advised against the use of any antiinflammatory medication by oral route.   On 12/28/2022 blood pressure under good control, medications will remain the same.      •   ·     7.The patient used to take copious amount of anti-inflammatory medication for arthritis, aches and pains. Given the fact that she has now been placed on anticoagulant, she is aware that anticoagulant and anti-inflammatory medicines are not good friends. The only medicine that she can take for pain is Tylenol. If the pain is bad enough she will require a prescription for Lortab that she is no longer taking.    8.On 08/16/2022 given the persistency of the pain in the left hip no matter what she does, I went ahead and scheduled a CT scan of the pelvis to be done before her next visit. Hopefully this will allow me to delineate the anatomy of this and I want to pray that this joint was not BACTERIAL SEEDING during the time of bacteremia and septic arthritis. So far on clinical grounds her white count is normal. We have requested a  sedimentation rate to be done today and this will need to be monitored. She states that this hip goes out of socket frequently and she has to end up in the emergency room.     I discussed all these facts with the patient’s brother who came all the way from [place]. I reviewed the records pertinent to this patient's care including all the records available in [facility] from the recent hospitalization at [facility] and I discussed the case with her infectious disease physician.  On 09/14/2022 I do not see anything that suggests any spreading of inflammatory infectious process throughout her skeleton anymore. Her hips look very symmetric in regard to the hardware and she has no symptoms that suggest any new infection with a normal white count today and pending sedimentation rate.  On 10/25/2022 the left hip remains painful especially when she first wakes up in the morning, thereafter warms up and she feels better. She has not had any luxation of the hip in the last several months.    •   9.This patient has very significant insomnia. The problem is what to provide her for this problem at this time. There are cardiac issues. I do believe that this patient will be better off at least for the next 6 weeks taking a benzodiazapine or something of this nature more than any other medication. I do not feel compelled to give her gabapentin that actually has not worked for her in the past. Therefore I went ahead and prescribed for her medication in this regard today to take it at bedtime to see if this allows her to sleep properly.     On 10/25/2022 the patient persists having insomnia not sleeping more than 3 hours taking Ambien. I went ahead and discontinued this medicine. I gave her Seroquel 25 mg tablets to take 1 orally nightly. I asked her to call my nurse Priya Gonzalez next week and let us know how she is doing in that arena.     I will review her back in 2 months with a CBC, CMP and a CA 15-3. I advised her to buy the  pulse oximeter as stated. I discusses these facts also with the patient's brother present in the room.   On 12/28/2022 I do believe that the patient overall is doing fantastic and I made the summary on each one of the points discussed above. I will review her back in 3 months with a CBC, CMP and CA 15-3. I find no need for radiological assessment at this time unless that the clinical circumstances change.     The patient's brother was present in the room and he helped with history.       ·

## 2022-12-29 ENCOUNTER — TELEPHONE (OUTPATIENT)
Dept: ONCOLOGY | Facility: CLINIC | Age: 62
End: 2022-12-29

## 2022-12-29 NOTE — TELEPHONE ENCOUNTER
----- Message from Elie Dixon MD sent at 12/28/2022  6:58 PM EST -----  CALL HER LABS ARE FINE LIVER FUNCTION AND TUMOR MARKER

## 2023-01-05 ENCOUNTER — HOSPITAL ENCOUNTER (OUTPATIENT)
Dept: CARDIOLOGY | Facility: HOSPITAL | Age: 63
Discharge: HOME | End: 2023-01-05
Admitting: INTERNAL MEDICINE
Payer: MEDICARE

## 2023-01-05 VITALS
HEART RATE: 63 BPM | DIASTOLIC BLOOD PRESSURE: 80 MMHG | WEIGHT: 156 LBS | OXYGEN SATURATION: 95 % | BODY MASS INDEX: 25.99 KG/M2 | SYSTOLIC BLOOD PRESSURE: 126 MMHG | HEIGHT: 65 IN

## 2023-01-05 DIAGNOSIS — C50.812 MALIGNANT NEOPLASM OF OVERLAPPING SITES OF LEFT BREAST IN FEMALE, ESTROGEN RECEPTOR POSITIVE: ICD-10-CM

## 2023-01-05 DIAGNOSIS — Z17.0 MALIGNANT NEOPLASM OF OVERLAPPING SITES OF LEFT BREAST IN FEMALE, ESTROGEN RECEPTOR POSITIVE: ICD-10-CM

## 2023-01-05 DIAGNOSIS — I27.20 PULMONARY HYPERTENSION: ICD-10-CM

## 2023-01-05 PROCEDURE — 93306 TTE W/DOPPLER COMPLETE: CPT

## 2023-01-05 PROCEDURE — 93306 TTE W/DOPPLER COMPLETE: CPT | Performed by: INTERNAL MEDICINE

## 2023-01-05 PROCEDURE — 93356 MYOCRD STRAIN IMG SPCKL TRCK: CPT | Performed by: INTERNAL MEDICINE

## 2023-01-05 PROCEDURE — 25010000002 PERFLUTREN (DEFINITY) 8.476 MG IN SODIUM CHLORIDE (PF) 0.9 % 10 ML INJECTION: Performed by: INTERNAL MEDICINE

## 2023-01-05 PROCEDURE — 93356 MYOCRD STRAIN IMG SPCKL TRCK: CPT

## 2023-01-06 ENCOUNTER — TELEPHONE (OUTPATIENT)
Dept: CARDIOLOGY | Facility: CLINIC | Age: 63
End: 2023-01-06
Payer: MEDICARE

## 2023-01-06 LAB
AORTIC ARCH: 3 CM
ASCENDING AORTA: 3.4 CM
BH CV ECHO LEFT VENTRICLE GLOBAL LONGITUDINAL STRAIN: -21.1 %
BH CV ECHO MEAS - ACS: 1.91 CM
BH CV ECHO MEAS - AO MAX PG: 5 MMHG
BH CV ECHO MEAS - AO MEAN PG: 2.5 MMHG
BH CV ECHO MEAS - AO ROOT DIAM: 3.2 CM
BH CV ECHO MEAS - AO V2 MAX: 111.3 CM/SEC
BH CV ECHO MEAS - AO V2 VTI: 21.9 CM
BH CV ECHO MEAS - AVA(I,D): 2.18 CM2
BH CV ECHO MEAS - EDV(CUBED): 129.7 ML
BH CV ECHO MEAS - EDV(MOD-SP2): 101 ML
BH CV ECHO MEAS - EDV(MOD-SP4): 91 ML
BH CV ECHO MEAS - EF(MOD-BP): 61.1 %
BH CV ECHO MEAS - EF(MOD-SP2): 60.4 %
BH CV ECHO MEAS - EF(MOD-SP4): 60.4 %
BH CV ECHO MEAS - ESV(CUBED): 21.5 ML
BH CV ECHO MEAS - ESV(MOD-SP2): 40 ML
BH CV ECHO MEAS - ESV(MOD-SP4): 36 ML
BH CV ECHO MEAS - FS: 45.1 %
BH CV ECHO MEAS - IVS/LVPW: 0.81 CM
BH CV ECHO MEAS - IVSD: 0.66 CM
BH CV ECHO MEAS - LAT PEAK E' VEL: 7 CM/SEC
BH CV ECHO MEAS - LV DIASTOLIC VOL/BSA (35-75): 51.1 CM2
BH CV ECHO MEAS - LV MASS(C)D: 123.9 GRAMS
BH CV ECHO MEAS - LV MAX PG: 2.17 MMHG
BH CV ECHO MEAS - LV MEAN PG: 1.5 MMHG
BH CV ECHO MEAS - LV SYSTOLIC VOL/BSA (12-30): 20.2 CM2
BH CV ECHO MEAS - LV V1 MAX: 73.7 CM/SEC
BH CV ECHO MEAS - LV V1 VTI: 17.2 CM
BH CV ECHO MEAS - LVIDD: 5.1 CM
BH CV ECHO MEAS - LVIDS: 2.8 CM
BH CV ECHO MEAS - LVOT AREA: 2.8 CM2
BH CV ECHO MEAS - LVOT DIAM: 1.88 CM
BH CV ECHO MEAS - LVPWD: 0.81 CM
BH CV ECHO MEAS - MED PEAK E' VEL: 5.4 CM/SEC
BH CV ECHO MEAS - MR MAX PG: 85.7 MMHG
BH CV ECHO MEAS - MR MAX VEL: 462.9 CM/SEC
BH CV ECHO MEAS - MV A DUR: 0.13 SEC
BH CV ECHO MEAS - MV A MAX VEL: 55.7 CM/SEC
BH CV ECHO MEAS - MV DEC SLOPE: 158.5 CM/SEC2
BH CV ECHO MEAS - MV DEC TIME: 0.2 MSEC
BH CV ECHO MEAS - MV E MAX VEL: 42.8 CM/SEC
BH CV ECHO MEAS - MV E/A: 0.77
BH CV ECHO MEAS - MV MAX PG: 1.56 MMHG
BH CV ECHO MEAS - MV MEAN PG: 0.67 MMHG
BH CV ECHO MEAS - MV P1/2T: 107.9 MSEC
BH CV ECHO MEAS - MV V2 VTI: 20.2 CM
BH CV ECHO MEAS - MVA(P1/2T): 2.04 CM2
BH CV ECHO MEAS - MVA(VTI): 2.36 CM2
BH CV ECHO MEAS - PA ACC TIME: 0.16 SEC
BH CV ECHO MEAS - PA PR(ACCEL): 5.3 MMHG
BH CV ECHO MEAS - PA V2 MAX: 88.2 CM/SEC
BH CV ECHO MEAS - PULM A REVS DUR: 0.12 SEC
BH CV ECHO MEAS - PULM A REVS VEL: 20.6 CM/SEC
BH CV ECHO MEAS - PULM DIAS VEL: 43.7 CM/SEC
BH CV ECHO MEAS - PULM S/D: 1.14
BH CV ECHO MEAS - PULM SYS VEL: 49.7 CM/SEC
BH CV ECHO MEAS - QP/QS: 0.75
BH CV ECHO MEAS - RAP SYSTOLE: 3 MMHG
BH CV ECHO MEAS - RV MAX PG: 1.24 MMHG
BH CV ECHO MEAS - RV V1 MAX: 55.7 CM/SEC
BH CV ECHO MEAS - RV V1 VTI: 12.7 CM
BH CV ECHO MEAS - RVOT DIAM: 1.9 CM
BH CV ECHO MEAS - RVSP: 39 MMHG
BH CV ECHO MEAS - SI(MOD-SP2): 34.3 ML/M2
BH CV ECHO MEAS - SI(MOD-SP4): 30.9 ML/M2
BH CV ECHO MEAS - SUP REN AO DIAM: 2.5 CM
BH CV ECHO MEAS - SV(LVOT): 47.7 ML
BH CV ECHO MEAS - SV(MOD-SP2): 61 ML
BH CV ECHO MEAS - SV(MOD-SP4): 55 ML
BH CV ECHO MEAS - SV(RVOT): 35.7 ML
BH CV ECHO MEAS - TAPSE (>1.6): 1.99 CM
BH CV ECHO MEAS - TR MAX PG: 36 MMHG
BH CV ECHO MEAS - TR MAX VEL: 300 CM/SEC
BH CV ECHO MEASUREMENTS AVERAGE E/E' RATIO: 6.9
BH CV XLRA - RV BASE: 2.3 CM
BH CV XLRA - RV LENGTH: 6.5 CM
BH CV XLRA - RV MID: 2.1 CM
BH CV XLRA - TDI S': 12.2 CM/SEC
LEFT ATRIUM VOLUME INDEX: 47.9 ML/M2
MAXIMAL PREDICTED HEART RATE: 156 BPM
SINUS: 3.2 CM
STJ: 3.1 CM
STRESS TARGET HR: 133 BPM

## 2023-01-06 PROCEDURE — 25010000002 PERFLUTREN (DEFINITY) 8.476 MG IN SODIUM CHLORIDE (PF) 0.9 % 10 ML INJECTION: Performed by: INTERNAL MEDICINE

## 2023-01-06 RX ADMIN — PERFLUTREN 2 ML: 6.52 INJECTION, SUSPENSION INTRAVENOUS at 13:07

## 2023-01-06 NOTE — TELEPHONE ENCOUNTER
Reviewed results and recommendations with Marylu Georges.  Patient's questions were answered and she verbalized understanding of results and recommendations.    Thank you,  Shannon Lambert RN  Triage Nurse [place]

## 2023-01-06 NOTE — TELEPHONE ENCOUNTER
Please let her know that her echocardiogram is stable. Recommend exercise as tolerated and monitoring BP at home.     Thanks!  ISAIAS Govea

## 2023-01-30 DIAGNOSIS — C50.812 MALIGNANT NEOPLASM OF OVERLAPPING SITES OF LEFT BREAST IN FEMALE, ESTROGEN RECEPTOR POSITIVE: ICD-10-CM

## 2023-01-30 DIAGNOSIS — Z17.0 MALIGNANT NEOPLASM OF OVERLAPPING SITES OF LEFT BREAST IN FEMALE, ESTROGEN RECEPTOR POSITIVE: ICD-10-CM

## 2023-01-30 RX ORDER — EXEMESTANE 25 MG/1
TABLET ORAL
Qty: 30 TABLET | Refills: 3 | Status: SHIPPED | OUTPATIENT
Start: 2023-01-30

## 2023-01-31 DIAGNOSIS — Z17.0 MALIGNANT NEOPLASM OF OVERLAPPING SITES OF LEFT BREAST IN FEMALE, ESTROGEN RECEPTOR POSITIVE: ICD-10-CM

## 2023-01-31 DIAGNOSIS — C50.812 MALIGNANT NEOPLASM OF OVERLAPPING SITES OF LEFT BREAST IN FEMALE, ESTROGEN RECEPTOR POSITIVE: ICD-10-CM

## 2023-01-31 RX ORDER — EXEMESTANE 25 MG/1
25 TABLET ORAL DAILY
Qty: 30 TABLET | Refills: 3 | OUTPATIENT
Start: 2023-01-31

## 2023-01-31 RX ORDER — METOPROLOL SUCCINATE 50 MG/1
50 TABLET, EXTENDED RELEASE ORAL 2 TIMES DAILY
Qty: 60 TABLET | Refills: 5 | Status: SHIPPED | OUTPATIENT
Start: 2023-01-31 | End: 2023-07-30

## 2023-01-31 NOTE — TELEPHONE ENCOUNTER
Caller: annabellaMarylu jensen    Relationship: Self    Best call back number: [phone]    Requested Prescriptions:   Requested Prescriptions     Pending Prescriptions Disp Refills   • apixaban (ELIQUIS) 5 MG tablet tablet 60 tablet      Sig: Take 1 tablet by mouth Every 12 (Twelve) Hours. Indications: Atrial Fibrillation   • metoprolol succinate XL (TOPROL-XL) 50 MG 24 hr tablet 60 tablet 5     Sig: Take 1 tablet by mouth 2 (Two) Times a Day for 180 days.        Pharmacy where request should be sent:    [facility] [id number] [place] [id number] [facility] [phone] [facility] [phone]     Additional details provided by patient: PT IS NEEDING THESE MEDICINES ORDERED BY DR. JOSUE.    Does the patient have less than a 3 day supply:  [x] Yes  [] No    Would you like a call back once the refill request has been completed: [x] Yes [] No    If the office needs to give you a call back, can they leave a voicemail: [x] Yes [] No    Yoon Armenta   01/31/23 15:34 EST

## 2023-03-29 ENCOUNTER — OFFICE VISIT (OUTPATIENT)
Dept: ONCOLOGY | Facility: CLINIC | Age: 63
End: 2023-03-29

## 2023-03-29 ENCOUNTER — LAB (OUTPATIENT)
Dept: LAB | Facility: HOSPITAL | Age: 63
End: 2023-03-29
Payer: MEDICARE

## 2023-03-29 VITALS
SYSTOLIC BLOOD PRESSURE: 143 MMHG | WEIGHT: 166.8 LBS | HEIGHT: 65 IN | BODY MASS INDEX: 27.79 KG/M2 | HEART RATE: 61 BPM | DIASTOLIC BLOOD PRESSURE: 91 MMHG | RESPIRATION RATE: 16 BRPM | OXYGEN SATURATION: 98 % | TEMPERATURE: 97.3 F

## 2023-03-29 DIAGNOSIS — C50.812 MALIGNANT NEOPLASM OF OVERLAPPING SITES OF LEFT BREAST IN FEMALE, ESTROGEN RECEPTOR POSITIVE: Primary | ICD-10-CM

## 2023-03-29 DIAGNOSIS — C50.811 MALIGNANT NEOPLASM OF OVERLAPPING SITES OF BOTH BREASTS IN FEMALE, ESTROGEN RECEPTOR POSITIVE: ICD-10-CM

## 2023-03-29 DIAGNOSIS — Z17.0 MALIGNANT NEOPLASM OF OVERLAPPING SITES OF BOTH BREASTS IN FEMALE, ESTROGEN RECEPTOR POSITIVE: ICD-10-CM

## 2023-03-29 DIAGNOSIS — I89.0 LYMPHEDEMA OF UPPER EXTREMITY, BILATERAL: ICD-10-CM

## 2023-03-29 DIAGNOSIS — K71.6 DRUG-INDUCED HEPATITIS: ICD-10-CM

## 2023-03-29 DIAGNOSIS — Z17.0 MALIGNANT NEOPLASM OF OVERLAPPING SITES OF LEFT BREAST IN FEMALE, ESTROGEN RECEPTOR POSITIVE: ICD-10-CM

## 2023-03-29 DIAGNOSIS — C50.812 MALIGNANT NEOPLASM OF OVERLAPPING SITES OF BOTH BREASTS IN FEMALE, ESTROGEN RECEPTOR POSITIVE: ICD-10-CM

## 2023-03-29 DIAGNOSIS — Z17.0 MALIGNANT NEOPLASM OF OVERLAPPING SITES OF LEFT BREAST IN FEMALE, ESTROGEN RECEPTOR POSITIVE: Primary | ICD-10-CM

## 2023-03-29 DIAGNOSIS — C50.812 MALIGNANT NEOPLASM OF OVERLAPPING SITES OF LEFT BREAST IN FEMALE, ESTROGEN RECEPTOR POSITIVE: ICD-10-CM

## 2023-03-29 DIAGNOSIS — T50.905A DRUG-INDUCED HEPATITIS: ICD-10-CM

## 2023-03-29 DIAGNOSIS — I10 PRIMARY HYPERTENSION: ICD-10-CM

## 2023-03-29 LAB
ALBUMIN SERPL-MCNC: 4.6 G/DL (ref 3.5–5.2)
ALBUMIN/GLOB SERPL: 1.8 G/DL (ref 1.1–2.4)
ALP SERPL-CCNC: 101 U/L (ref 38–116)
ALT SERPL W P-5'-P-CCNC: 15 U/L (ref 0–33)
ANION GAP SERPL CALCULATED.3IONS-SCNC: 10.7 MMOL/L (ref 5–15)
AST SERPL-CCNC: 24 U/L (ref 0–32)
BASOPHILS # BLD AUTO: 0.04 10*3/MM3 (ref 0–0.2)
BASOPHILS NFR BLD AUTO: 0.9 % (ref 0–1.5)
BILIRUB SERPL-MCNC: 0.4 MG/DL (ref 0.2–1.2)
BUN SERPL-MCNC: 14 MG/DL (ref 6–20)
BUN/CREAT SERPL: 15.1 (ref 7.3–30)
CALCIUM SPEC-SCNC: 9.9 MG/DL (ref 8.5–10.2)
CANCER AG15-3 SERPL-ACNC: 14.6 U/ML
CHLORIDE SERPL-SCNC: 105 MMOL/L (ref 98–107)
CO2 SERPL-SCNC: 24.3 MMOL/L (ref 22–29)
CREAT SERPL-MCNC: 0.93 MG/DL (ref 0.6–1.1)
DEPRECATED RDW RBC AUTO: 48.4 FL (ref 37–54)
EGFRCR SERPLBLD CKD-EPI 2021: 68.3 ML/MIN/1.73
EOSINOPHIL # BLD AUTO: 0.12 10*3/MM3 (ref 0–0.4)
EOSINOPHIL NFR BLD AUTO: 2.7 % (ref 0.3–6.2)
ERYTHROCYTE [DISTWIDTH] IN BLOOD BY AUTOMATED COUNT: 13.7 % (ref 12.3–15.4)
GLOBULIN UR ELPH-MCNC: 2.5 GM/DL (ref 1.8–3.5)
GLUCOSE SERPL-MCNC: 110 MG/DL (ref 74–124)
HCT VFR BLD AUTO: 41.6 % (ref 34–46.6)
HGB BLD-MCNC: 13.3 G/DL (ref 12–15.9)
IMM GRANULOCYTES # BLD AUTO: 0.01 10*3/MM3 (ref 0–0.05)
IMM GRANULOCYTES NFR BLD AUTO: 0.2 % (ref 0–0.5)
LYMPHOCYTES # BLD AUTO: 1.05 10*3/MM3 (ref 0.7–3.1)
LYMPHOCYTES NFR BLD AUTO: 23.3 % (ref 19.6–45.3)
MCH RBC QN AUTO: 30.7 PG (ref 26.6–33)
MCHC RBC AUTO-ENTMCNC: 32 G/DL (ref 31.5–35.7)
MCV RBC AUTO: 96.1 FL (ref 79–97)
MONOCYTES # BLD AUTO: 0.3 10*3/MM3 (ref 0.1–0.9)
MONOCYTES NFR BLD AUTO: 6.7 % (ref 5–12)
NEUTROPHILS NFR BLD AUTO: 2.99 10*3/MM3 (ref 1.7–7)
NEUTROPHILS NFR BLD AUTO: 66.2 % (ref 42.7–76)
NRBC BLD AUTO-RTO: 0 /100 WBC (ref 0–0.2)
PLATELET # BLD AUTO: 169 10*3/MM3 (ref 140–450)
PMV BLD AUTO: 8.1 FL (ref 6–12)
POTASSIUM SERPL-SCNC: 4.5 MMOL/L (ref 3.5–4.7)
PROT SERPL-MCNC: 7.1 G/DL (ref 6.3–8)
RBC # BLD AUTO: 4.33 10*6/MM3 (ref 3.77–5.28)
SODIUM SERPL-SCNC: 140 MMOL/L (ref 134–145)
WBC NRBC COR # BLD: 4.51 10*3/MM3 (ref 3.4–10.8)

## 2023-03-29 PROCEDURE — 99214 OFFICE O/P EST MOD 30 MIN: CPT | Performed by: INTERNAL MEDICINE

## 2023-03-29 PROCEDURE — 85025 COMPLETE CBC W/AUTO DIFF WBC: CPT

## 2023-03-29 PROCEDURE — 86300 IMMUNOASSAY TUMOR CA 15-3: CPT | Performed by: INTERNAL MEDICINE

## 2023-03-29 PROCEDURE — 36415 COLL VENOUS BLD VENIPUNCTURE: CPT

## 2023-03-29 PROCEDURE — 80053 COMPREHEN METABOLIC PANEL: CPT

## 2023-03-29 NOTE — PROGRESS NOTES
Subjective     REASON FOR FOLLOW UP:  1. 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 R BREAST MASS WITH GIANT R AXILLARY ADENOPATHY.  SHE UNDERWENT DOSE DENSE AC, TAXOL, BILATERAL MASTECTOMIES, DRAMATIC NEAR COMPLETE KS, RADIATION THERAPY AND ADJUVANT FEMARA STOPPED ON 12/21 BECAUSE DRUG INDUCED HEPATITIS, SWITCHED TO AROMASIN 2/22: NO SIDE EFFECTS.    3. FAMILY HISTORY OF BREAST CANCER IN MOTHER AND SISTER, SISTER WAS TESTED FOR BRCA AND WAS NEGATIVE.    4. LEFT OVARIAN CYST , IT HAS BEEN PRESENT FOR LONG TIME BUT IS GETTING LARGER, ASYMPTOMATIC, NEED FOR , PELVIC US AND GYN ONC EVal: no need for any intervention                  5. LEFT HIP PAIN SEVERE ARTHRITIS, REAL NEED FOR LEFT HIP REPLACEMENT: PERFORMED 8/21    6. DRUG INDUCED HEPATITIS,  FINALLY STOPPED FEMARA: DRAMATIC IMPROVEMENT AND RESOLUTION.      On 03/29/2023 this 67-year-old female who has high risk breast cancer undergoing therapy at this time with aromatase inhibitor returns to the office for follow up. The patient also has had in the past multifocal area of septic arthritis that was treated with antibiotic therapy with resolution. She also has osteoarthritis, atrial fibrillation, hypertension and previous stroke. In any event the patient is here today with her brother who comes from [place] for a visit. Typically the patient feels very well, her energy level is excellent, her appetite is good, her weight is stable, her bowel function with occasional constipation, no passage of blood in the stool. Urination is normal. As usual typical joint pain in her hips associated with previous replacement therapy and osteoarthritis especially in her hands, shoulders and knees. She is taking occasional ibuprofen. She has not had any runs of atrial fibrillation and she has not had any angina. She continues taking her blood pressure medication. She continues taking her aromatase inhibitor with no further joint pain that  she can tell the difference between taking it or not taking the medicine. She has not developed any new symptoms that would suggest metastatic or progressive breast cancer. Her lymphedema is minimal if any at all with no secondary infection in her upper extremities or skin.                   Past Medical History:   Diagnosis Date   • Anemia in neoplastic disease    • Arthritis    • Arthritis of back    • Arthritis of neck    • Breast cancer (HCC)     Left   • CVA (cerebral vascular accident) (HCC)    • Fracture, fibula    • Fracture, finger    • Frozen shoulder    • Hip arthrosis 01/17   • Hip pain     RIGHT HIP... CYST   • History of fracture of leg 1987   • History of radiation therapy     LAST TREATMENT     • Hypertension    • Knee swelling    • Limited joint range of motion (ROM)     RIGHT HIP   • Low back strain    • Periarthritis of shoulder    • Rotator cuff syndrome 6/22   • Skin sore     OPEN SORE LEFT BREAST   • Syncope    • Vertigo            Past Surgical History:   Procedure Laterality Date   • AXILLARY LYMPH NODE BIOPSY/EXCISION Right     LYMPH NODE UNDER RIGHT ARM-MALIGNANT (DOUBLE MASTECTOMY)   • BREAST BIOPSY Left     MALIGNANT   • FRACTURE SURGERY  1987    Leg   • HARDWARE REMOVAL     • INCISION AND DRAINAGE LEG Left 06/30/2022    Procedure: INCISION AND DRAINAGE left ankle;  Surgeon: Kenneth Tran MD;  Location: [facility];  Service: Orthopedics;  Laterality: Left;   • JOINT REPLACEMENT  mar 2020,july 2021    hips   • MASTECTOMY W/ SENTINEL NODE BIOPSY Bilateral 09/16/2019    Procedure: BILATERLA MODIFIED RADICAL MASTECTOMY WITH BILATERAL SENTINEL LYMPH NODE BIOPSY;  Surgeon: Joby Barron Jr., MD;  Location: [facility] OR;  Service: General   • TOTAL HIP ARTHROPLASTY Right 03/02/2020    Procedure: RIGHT TOTAL HIP ARTHROPLASTY NATALY NAVIGATION;  Surgeon: Luis M Leonard MD;  Location: [facility] OR;  Service: Orthopedics;  Laterality: Right;   • TOTAL HIP ARTHROPLASTY Left  07/20/2021    Procedure: Posterior LEFT TOTAL HIP ARTHROPLASTY NATALY NAVIGATION;  Surgeon: Luis M Leonard MD;  Location: [facility] OR;  Service: Orthopedics;  Laterality: Left;   • VENOUS ACCESS DEVICE (PORT) INSERTION Right 02/01/2019    Procedure: INSERTION VENOUS ACCESS DEVICE;  Surgeon: Joby Barron Jr., MD;  Location: [facility];  Service: General   • VENOUS ACCESS DEVICE (PORT) REMOVAL N/A 10/30/2019    Procedure: Mediport Removal;  Surgeon: Joby Barron Jr., MD;  Location: [facility] OR;  Service: General        Current Outpatient Medications on File Prior to Visit   Medication Sig Dispense Refill   • acetaminophen (TYLENOL) 325 MG tablet Take 2 tablets by mouth Every 6 (Six) Hours As Needed.     • apixaban (ELIQUIS) 5 MG tablet tablet Take 1 tablet by mouth Every 12 (Twelve) Hours. Indications: Atrial Fibrillation 60 tablet 1   • Cholecalciferol 250 MCG (81962 UT) tablet Take 1 tablet by mouth. As directed     • Diclofenac Sodium (VOLTAREN) 1 % gel gel Apply 4 g topically to the appropriate area as directed 4 (Four) Times a Day As Needed. RARE PRN     • exemestane (AROMASIN) 25 MG chemo tablet TAKE ONE TABLET BY MOUTH DAILY 30 tablet 3   • metoprolol succinate XL (TOPROL-XL) 50 MG 24 hr tablet Take 1 tablet by mouth 2 (Two) Times a Day for 180 days. 60 tablet 5   • polyethylene glycol (polyethylene glycol) 17 g packet Take 17 g by mouth 2 (Two) Times a Day. (Patient taking differently: Take 17 g by mouth 2 (Two) Times a Day As Needed.)     • sodium chloride 0.65 % nasal spray   1 Spray, Nasal, Drops, Q4H, PRN Nasal Congestion, 0 Refill(s)     • tiZANidine (ZANAFLEX) 4 MG tablet Take 1 tablet by mouth Every 6 (Six) Hours As Needed for Muscle Spasms.     • [DISCONTINUED] digoxin (LANOXIN) 125 MCG tablet Take 1 tablet by mouth Daily.       Current Facility-Administered Medications on File Prior to Visit   Medication Dose Route Frequency Provider Last Rate Last Admin   • Chlorhexidine Gluconate  "Cloth 2 % pads 1 each  1 each Apply externally Take As Directed Luis M Leonard MD            ALLERGIES:    Allergies   Allergen Reactions   • Benadryl [Diphenhydramine] Itching     INCREASES BLOOD PRESSURE   • Erythromycin GI Intolerance   • Levaquin [Levofloxacin] GI Intolerance   • Penicillins GI Intolerance        Social History     Socioeconomic History   • Marital status:      Spouse name: Cruz   • Number of children: 0   Tobacco Use   • Smoking status: Never   • Smokeless tobacco: Never   Vaping Use   • Vaping Use: Never used   Substance and Sexual Activity   • Alcohol use: Yes     Comment: occasional   • Drug use: No   • Sexual activity: Not Currently     Partners: Male     Birth control/protection: Abstinence        Family History   Problem Relation Age of Onset   • Lung cancer Father    • Irritable bowel syndrome Father    • Cancer Mother    • Breast cancer Mother    • Liver disease Mother    • Breast cancer Sister 48   • Breast cancer Maternal Grandmother    • Breast cancer Paternal Grandmother    • Breast cancer Maternal Uncle    • Malig Hyperthermia Neg Hx             Objective     Vitals:    03/29/23 0935   BP: 143/91   Pulse: 61   Resp: 16   Temp: 97.3 °F (36.3 °C)   TempSrc: Temporal   SpO2: 98%   Weight: 75.7 kg (166 lb 12.8 oz)   Height: 165.1 cm (65\")   PainSc: 0-No pain     Current Status 3/29/2023   ECOG score 0     Exam:         GENERAL:  Well-developed, Patient  in no acute distress.   SKIN:  Warm, dry ,NO purpura ,no rash.  HEENT:  Pupils were equal and reactive to light and accomodation, conjunctivae noninjected,  normal visual acuity.   NECK:  Supple with good range of motion; no thyromegaly , no JVD or bruits,.No carotid artery pain, no carotid abnormal pulsation   LYMPHATICS:  No cervical, NO supraclavicular, NO axillary, NO inguinal adenopathies.  CARDIAC   normal rate , regular rhythm, without murmur,NO rubs NO S3 NO S4   LUNGS: normal breath sounds bilateral, no wheezing, NO " rhonchi, NO crackles ,NO rubs.  Her chest wall shows bilateral mastectomies with telangectasia's in previous areas of radiation therapy. No masses, nodularity or tenderness and no lymphedema in either extremity. No axillary adenopathies.       VASCULAR VENOUS: no cyanosis, NO collateral circulation, NO varicosities, NO edema, NO palpable cords, NO pain,NO erythema, NO pigmentation of the skin.  ABDOMEN:  Soft, NO pain,no hepatomegaly, no splenomegaly,no masses, no ascites, no collateral circulation,no distention.  EXTREMITIES  AND SPINE:  No clubbing, no cyanosis ,OA HANDS AND KNEE deformities , no pain .No kyphosis,  no pain in spine, no pain in ribs , no pain in pelvic bone.  NEUROLOGICAL:  Patient was awake, alert, oriented to time, person and place.                  .            RECENT LABS:  Hematology WBC   Date Value Ref Range Status   03/29/2023 4.51 3.40 - 10.80 10*3/mm3 Final     RBC   Date Value Ref Range Status   03/29/2023 4.33 3.77 - 5.28 10*6/mm3 Final     Hemoglobin   Date Value Ref Range Status   03/29/2023 13.3 12.0 - 15.9 g/dL Final     Hematocrit   Date Value Ref Range Status   03/29/2023 41.6 34.0 - 46.6 % Final     Platelets   Date Value Ref Range Status   03/29/2023 169 140 - 450 10*3/mm3 Final                      Assessment & Plan    1.  History of least for the last 4 years, she has had an in crescendo mass in the left breast that has produced a gigantic tumor that WAS close to 25 cm in size and is replacing most of the anatomy of the left breast. There are areas of ulceration necrosis and bleeding and the mass is fixed to the chest wall. She has abundant amount of collateral circulation in the left anterior chest wall and it caught my attention the lack of any left axillary adenopathy. She has no lymphedema in the left upper extremity. In the right breast while in sitting position, no palpable abnormalities are documented. The right breast skin and nipple are normal. When the patient lies  flat, there is a palpable mass at 9 o'clock from the nipple that measures probably 4 cm in size, very indistinct in regard to edges and margins. There was no mobility and no areas of tenderness. She has a giant adenopathy in the right axilla that measures close to 9 cm in size, uniform, no fluctuation, nontender, completely fixed to the axillary wall. There is no compromise of the skin.     After completion of 4 cycles of AC patient has had very dramatic improvement in all sites of disease including right axilla and left breast.     Completed 4 cycles Adriamycin Cytoxan on 4/12/2019.     Patient started weekly Taxol treatments on 5/3/2019.   Completed 12 weekly Taxol treatments on 7/19/2019.   9/16/2019 bilateral mastectomy by Dr. Joby Barron with axillary lymph node dissection.  No residual malignancy.   10/30/2019 patient's Mediport was removed in anticipation of radiation.   Radiation therapy under the care of Dr. Marmolejo 10/31/2019-1/13/2020 to the right chest wall.   Started on adjuvant Femara 2.5 mg daily 8/20/2019.   9/21/2020 continues on adjuvant Femara, tolerating it quite well.  Some mild hot flashes and mild arthralgias, all which are tolerable.  • I advised the patient that at this point her breast cancer remains in remission. She is 100% compliant of her medication letrozole and her clinical examination remains negative normal for breast cancer recurrence.   • The patient was further reviewed on 04/16/2021. Her clinical examination is completely negative normal and her questioning is negative in regard to symptoms that would suggest breast cancer recurrence. She is 100% compliant with her Femara. Her white count, hemoglobin and platelets remain normal. Her tumor marker during the previous visit was normal and we have another one pending today CA 15-3.   • The patient was further reviewed on 05/17/2021 along with her brother. The clinical examination has not changed. The only new complaint is the  progressive amount of discomfort and the inability to function given the pain in the left hip area. Now she is limping. The only time when she is not in discomfort with the left hip is when she is sitting or lying in bed or riding the horse when gravity takes away the pressure of her left hip area. Radiologically speaking the CT scan of the chest, abdomen and pelvis to my eyes disclosed no abnormalities besides a very simple ovarian cyst that measures close to 7 x 5 cm with no trabeculation. There is no pelvic ascites. There is no pelvic adenopathy. The other abnormality obviously visible is the severe degree of scoliosis of the thoracic, lumbar spines.     It caught my attention the subchondral cyst in the left hip and the minimal if any space leftover between the head of the femur and the acetabulum. There is no metastasis in this anatomical site.     The radiologist mentioned cardiophrenic angle lymphadenopathy. I cannot see that from my naked eyes. I do not know what it is and I do not know how to express it. Pulmonary anatomy is normal. Hilar adenopathy is normal. No pericardial effusion. No pleural effusion. Minimal infiltrate in the lungs related to radiation changes. Liver anatomy, pancreas and kidneys were unremarkable. The scoliosis is very obvious in the view of the abdomen.     Her CA 15-3 was minimal elevated in comparison to previous assessment and it raises the following question.   1. Is the cardiophrenic node described by the radiologist indeed significant of breast cancer recurrence or not? The only way to know will be to do a PET scan. This will be scheduled.   2. She is known for having an ovarian cyst for a long time but now is getting bigger, 7 cm across, and has no TABICATION with no pelvic ascites. I do not believe that this is representation of malignancy; nevertheless, I am going to run a CA-125 on her and I will proceed with a pelvic ultrasound as well as a GYN oncology referral for  review.   3. I will send a notification to Dr. Leonard, the patient’s orthopedic surgeon, in regard to all her issues pertinent to the left hip. I think the patient is getting closer and closer to having a left hip replacement. 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 She recovered extremely well from her right hip surgery before.     The patient returned to the office on 05/24/2021. Since the previous visit the patient proceeded with a PET scan and I have reviewed this with her in the PAC system showing chest wall on the left side area of enlightening SUV activity that is almost 3 cm long x 7 mm wide. It is behind the bone. It is very close to the lower aspect of her heart. The reset of the PET scan discloses no activity. This is also specific in regard to the ovaries and actually an ultrasound of her vagina looking into the ovaries documented an unilocular cyst on the left side with typical features of benignity and a  was completely normal 5.5.     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 The rest of the PET scan is very much negative normal. I would not be surprised if this is an area of the internal mammary node chain.     Obviously the ovarian cyst is benign only locular with a normal  and I find no reason to refer her to GYN oncology anymore.     Therefore my advice to her is as follows:  1. She will remain on her letrozole 2.5 mg a day.  2. She will initiate Kisqali at the usual dose 3 weeks on 1 week off making her aware of the side effects of the medicine including leukopenia, nausea, vomiting, rash, diarrhea, abnormalities in the liver function test and neutropenic fever. She will take the medicine  at least for the next 6 months.  3. The patient will proceed with referral to radiation oncology to treat this area completely.  4. I am going to go ahead and make a referral to Cardiology in preparation for this site of radiation therapy and knowing that tangential fields will be difficult to produce, I think it will be important to have her to be seen by Cardiology in preparation of Kisqali use. Also I advised her that I would like for her to take a magnesium supplement and she needs to eat plenty of nuts to minimize hypomagnesemia that can help her to prolong QT interval that is the most important side effect of Kisqali to my eyes. I advised her to continue her blood pressure medication and I advised her also to have an EKG today and also have a repeated EKG upon return in 3 weeks.    5. The patient will be having formal education and consent for Kisqali and she knows that this medicine will come to her from a speciality pharmacy.   6. The patient will require to have a repeat PET scan at least 6-8 weeks after completion of her radiation therapy to the chest wall.   7. I advised her and her brother present on the telephone of all of these events and she was ready to proceed.     • The patient was further reviewed on 07/07/2021 after she has continued her Kisqali and completion of the 1st round of the medicine. Today her EKG disclosed a normal QT interval and this has been sent to Cardiology to be reported officially. 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 She has completed her radiation therapy to the epicardial right lymph node with no difficulties or side effects.     The thing that is bothering her the most is the pain in the left hip associated with advanced degenerative arthritis and she is limping substantially and having discomfort requiring to take pain medicine, Voltaren, on a regular schedule. She already has been scheduled to have her hip replaced in a few days. In  preparation for this I have advised the patient the following.   1. She will discontinue her aspirin and her nonsteroidal anti-inflammatory medications a week in advance for her surgery. This will minimize the potential for bleeding.   2. The patient will hold off on the Kisqali until I review her back in the office in 6 weeks. She will require a blood count done 3 days before the surgery at the same time that she will require her official COVID test before the surgical intervention.   3. The patient will remain on Femara for the time being at 2.5 mg a day. She has no need to stop this medication anytime besides the day of the surgery. She will resume this after the surgery and as soon as she is able to receive oral route.   4. Eventually the patient will require radiological assessment upon review in order to see what happened to the epicardial lymph node.           • The patient was further reviewed on 09/30/2021. She has recovered further from the left hip surgery but she is not doing physical therapy anymore. She has a minimal limp and I advised her to go back on physical therapy on her own at home. She knows how to do the exercises and she has the afternoon off every single day.    The patient completed her Kisqali a week ago. Her white count is low today. The hemoglobin is stable. The platelet count is stable. She has had issues with the consecution of the Kisqali but the pharmacists have been working on this process with  her specialty pharmacy.     Today it caught my attention the significant bump in her liver function test, SGOT, SGPT. The patient is not drinking any alcohol. She is not taking any cholesterol medicine. She is not taking any anti-inflammatory medication and leads me to think that Kisqali is the culprit of this. Given these findings we advised the patient to put the Kisqali on hold until we recheck chemistry profile on weekly basis for the next 3 or 4 weeks. We need to settle these numbers  down before she continues the Kisqali. She probably will require dose adjustment at some point. Again, her hepatitis A, B and C serologies are negative.     Instructed pharmacist to discuss these issues with her as well.     She will be called at home with the report of her CA 15-3.     I encouraged her to remain on her Femara though.     We provided her blood pressure medication. She will remain on this for the time being.     • 1. In regard to her history of breast cancer she was reviewed on 11/19/2021. Since the previous visit the patient has had tumor marker CA 15-3 that has dropped to 20. Radiological assessment of her chest and abdomen CT scan that documents resolution of the epicardial lymphadenopathy in the right hemithorax, no pulmonary nodules or metastasis, no pleural effusion and no liver metastasis. No bone metastasis. Based on this information I do believe that the breast cancer is relatively quiet clinically, biochemically and radiologically and I advised her today to resume her Femara at the dose of 2.5 mg a day. The likelihood of Femara producing liver dysfunction is more than remote.   2. This patient has developed very significant abnormalities in her liver function test with persistent elevation of the SGOT, SGPT and alkaline phosphatase and normal bilirubin. 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 I have tested a multitude of liver issues including hepatitis A, B and C serologies that were negative, antitrypsin 1 that was normal, antimicrosomal antibodies that was normal, and AARON. Anti-celiac sprue panel also was done, ferritin level, copper also were done looking for Stephen's disease and hemochromatosis. All of these conditions are basically ruled out. I even went ahead and scheduled her to have a liver biopsy that documents according to the pathologist inflammatory process in the liver consistent with drug effect.    We have discontinued the Kisqali 2 months ago thinking that  that was the culprit but the numbers have not improved, then we discontinued most of the other supplemental medicines that she was taking, this led the numbers to improve and the only thing that she is left taking is Voltaren. She takes the Voltaren for her arthritis. I advised her on 11/19/2021 that she must discontinue the Voltaren and hopefully this will be making her numbers in regard to liver dysfunction to improve.     We are sure that this patient is not drinking alcohol at all.    We reviewed her medication list today and the only thing that she is taking is metoprolol and vitamin D. We asked her to remain on these medicines.     I even went ahead a couple of weeks ago to put her on a low dose prednisone to see if this had any benefit in her liver function test and it has not. I asked her today to discontinue this medicine as well.     I insisted today that the most likely reason why her liver function test is abnormal after all of the reviewing that we have done and also reviewing an alpha fetoprotein that was negative normal is drug effect. The only medicine that is leftover is antiinflammatory medication. Metoprolol doing this is kind of unexpected and she does not take Tylenol in enough amount to trigger this abnormality neither. She raised the question if she could take a Tylenol here and there and I think for now it will be okay but she will need to deal with her arthritic symptoms in a topical way or physical therapy way, acupuncture or some other methodology.     I would like for her to come to the office every couple of weeks to do a CBC and a CMP and nurse visit. I will review her back in 6 weeks with a CBC, CMP and CA 15-3. I expect that if the Voltaren is the culprit her liver function test should be normal in 6 weeks.     I will communicate all of these issues to Dar Kirkpatrick MD, who has seen her before. I do not know if he will agree with my assessment but he is welcome to pitch in with  any other objection or idea.      I discussed all of these facts with the patient and her brother in the room. I went piece by piece and item by item over all of these issues and significance of each one of them. They understood this clearly.   • The patient was further reviewed on 12/29/2021. Recent review of her liver function tests a few days ago documented persistent elevation of her SGOT, SGPT, and alkaline phosphatase. She has discontinued most of the medicines and I have no other choice but discontinue the Femara. Since then and actually today is the 1st day in many weeks that the SGOT and SGPT and alkaline phosphatase are improving. The patient actually remains asymptomatic in this regard. Her liver is not enlarged. She has no jaundice. She has no rash, no skin lesions and no other new alterations. That is good news. Her white blood count, hemoglobin and platelets remain stable. 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 That is extremely rare. As I posted above, I discussed with all my partners in regard to how many times through their careers prescribing Femara to multiple breast cancers they have seen Femara triggering hepatitis, none of them gave me a positive answer. I reviewed this information in [employer] and it is reported in less than 1% of patients taking this medication. I think we are in front of a rare situation but I want for her to withhold any further Femara treatment for the time being and I want to review her back in 3 weeks. If the liver function tests continue improving or normalize by then maybe we will have the answer once and for all. Raises the question what we will do next and I think the next medicine will be the utilization of Aromasin that has a different chemical component and different methodology for action. I made her aware of that. We  are not going to go back in giving her Kisqali under the present circumstances given the confusion and all the issues pertinent to her liver dysfunction.     She already has been seen by Cardiology with no new issues or commentaries by Dr. Odell and her note has been reviewed today. Actually her blood pressure today looks terrific. Her pulse is normal. Her weight is stable. Her metoprolol is still ongoing in a minimal dose.     Therefore, she will return with a CBC, CMP stat in 3 weeks and further review at that point.  • She was further reviewed on 01/25/2022. Her clinical examination is negative for metastasis, her liver is not enlarged and she has no jaundice. She discontinued her Femara close to 6 weeks ago and I am expecting to review her liver function test today. Also we requested tumor markers. They have been within normal limits.     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 She is advised to undergo therapy with Aromasin at the dose of 25 mg a day. She will require to be reassessed in 1 month.     We are not going to reestablish Kisqali therapy until we are sure that her liver is going to be able to handle all of the medications at this point.    In regard to her blood pressure control we are going to send prescription to her pharmacy to have a year of refill on this medication. If the patient needs to go back on aspirin or not remains to be seen until we review her liver function test as well. She remains on her vitamin D supplement and she is taking minimal amount of Tylenol for pain.     I will review her back in a month with a CBC, CMP and a CA 15-3.     I discussed all of these facts with the patient and her brother present in the room.   • The patient was further reviewed on 03/07/2022. Since the previous visit the patient has stopped the Femara in 12/2021 and today her liver function tests are completely back to normality. This proves the point that Femara was the culprit  phenomenon that I never have seen and discussed with my partner, Danelle Cespedes MD. He never has seen this neither.     In any event the patient's liver function tests are back to normal today. The patient is tolerating her Aromasin extremely well with the exception of hot flashes but otherwise no other new issues. We have her CA 15-3 pending today. This will be discussed with her once that it becomes available. So far we have not documented any radiological or clinical progression of her breast cancer and she will remain again on the Aromasin for the time being.  I am not planning to add any Kisqali to her treatment at this point unless that the tumor marker has a dramatic increase.     In regard to her hypertension the patient will remain on her metoprolol that she has been taking for the time being. In regard to her previous history of stroke she will remain on a baby aspirin.     In regard to pain management for arthritis I asked her to remain on Tylenol to minimize the use of antiinflammatory medication that could have a lot of issues in regard to side effects. She will be allowed to use an antiinflammatory medication like Aleve, ibuprofen, just very much on prn basis very sporadically.     I will review her back in 2 months with a CBC, CMP and a CA 15-3.    Otherwise I am not planning to have any other intervention or radiological analysis unless that again the tumor marker shows significant modification.     I discussed all of these facts with the patient and her daughter present in the room.      • The patient returned on 05/09/2022 with no symptoms of anything in particular besides minimal respiratory allergies and pain in the left hip associated with her previous surgery. Her clinical examination today is very much unremarkable, she looks fantastic. She has no symptoms or signs of breast cancer recurrence. White count, hemoglobin and platelets are normal. Her tumor marker CA 15-3 has remained normal and  actually lower than ever and compliance with Aromasin has been 100% with excellent tolerance.     Given the circumstances of this I advised her to remain on Aromasin 25 mg a day and I find no use for this patient to go back onto Kisqali or medicines of this nature.     From the point of view of her abnormal liver function abnormalities associated with Femara utilization her chemistry profile today is completely normal including SGOT, SGPT, bilirubin, alkaline phosphatase. On clinical grounds her liver is not enlarged. It is impressive to see how much hepatitis she got out of Femara and how quickly she got better after stopping the Femara. This is extremely unusual but it happened.    I notified the patient of this good finding and obviously she will never take Femara again.    I will review her back in 6-8 weeks to continue monitoring the clinical situation along with a CBC, CMP and CA 15-3.     Finally I encouraged her to remain on her aspirin and be sure that she takes her blood pressure medication. I provide these medicines for her.    Also Toprol will be continued and gabapentin for hot flashes or insomnia could be utilized and could be utilized as well for pain.    I discussed all of these facts with the patient and her brother in the room.   On 08/16/2022 we reviewed the patient in regard to her history of breast cancer. Clinically she has not had any obvious recurrent disease in the chest wall in the lung anatomy or abdomen or pelvis. She remains on Aromasin adjuvantly and we are waiting to review tumor marker. She has a chest wall that is completely flat due to previous mastectomies and radiation therapy. There is no axillary adenopathies and the patient has in my opinion control of her disease process as far as we can tell. I advised her to remain on Aromasin 25 mg a day. I went ahead and scheduled a visit with us in a few weeks in order to further review radiological analysis that will be discussed  below.  • On 09/14/2022 the patient has had in the interim a CT scan of the chest, abdomen and pelvis for review personally. Her pulmonary anatomy remains normal, there is no pulmonary congestion, pleural effusions, pericardial effusion, cardiomegaly, hilar or mediastinal adenopathy. There is prepericardiac lymph node measuring almost 1.5 to 2 cm in size that is flat like a small finger that has been present before and has minimally enlarged. If this has anything to do with her previous history of breast cancer is difficult to state but this has been evaluated before with similarity in regards size and some degree of fluctuation. I think this does not constrain me from thinking with a normal tumor marker of CA 15-3 of 20 during the previous visit or compel to perform either removal or biopsy at this time or proceed with radiological assessment with a PET scan. I do believe that this is not representation of anything in particular. Her liver anatomy and the rest of the bony anatomy, pancreas, kidneys, spleen and retroperitoneum remain unchanged. Given this finding I advised the patient to proceed and continue her Aromasin without the need to add any other medication to her regimen of medication especially in the background of A-fib. I advised her to proceed with reassessment with me in 6 weeks with a repeat CBC, CMP and CA 15-3. In that moment also we will obtain a liquid biopsy for further analysis.   •   • Her liver function test has remained normal and Aromasin has not produced any chemical hepatitis. I advised her again to remain on this medicine by itself.   On 10/25/2022 the patient remains on Aromasin. She has no symptoms or signs that would indicate breast cancer recurrence and the latest tumor marker CA 15-3 was 20 two months ago. We have another one pending today. Given these circumstances I advised her to remain on Aromasin for the time being. I find no need for radiological assessment at this time. She  will be called at home with the report of her tumor marker.   On 12/28/2022 the patient had no symptoms or signs of breast cancer progression or recurrence on any level. Tolerance to Aromasin is excellent with no side effects and she has 100% compliance on the medication. She was advised to remain on the medication. Her tumor marker CA 15-3 has remained normal. No plans for radiological assessment unless new clinical changes occur or tumor marker changes.   On 03/29/2023 the patient has no symptoms or signs of breast cancer recurrence. She continues taking her aromatase inhibitor medicine on a routine basis with 100% compliance and no significant side effects from the medication. Clinically she has no evidence of breast cancer recurrence and during the previous visit her CA 15-3 remained normal, another one is pending today that will be discussed with her on the telephone once it becomes available. Given the stability of her clinical picture and the excellent clinical status I advised the patient to remain on her medicine for the time being returning to see us back every 3 months with a CBC, CMP and CA 15-3. I find no need for radiological assessment on her at this point.         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 Her liver enzymes are completely normal today. She remains on Aromasin and obviously this patient never will be back on letrozole under any circumstances. This was documented through liver biopsy and through removal of the medication that triggered quick improvement in her liver function test.  •  On 09/14/2022 there is no evidence of drug induced hepatitis. Aromasin will be continued. The patient is aware that she never will be able to take Femara.   On 10/25/2022 her liver enzymes are completely normal today. Since discontinuation of Femara her drug induced hepatitis has resolved. This situation was extremely rare.   ON 12/28/2022 no issues pertinent to liver function  after discontinuation of Femara months ago.   On 03/29/2023 waiting to review her liver function. All of the abnormalities resolved after stopping letrozole.         •   ·   3.The patient has developed an episode of septic arthritis in many joints including left shoulder and left ankle. She required antibiotic therapy as per recently. Infectious Disease was involved in this. In fact I discussed the case with them on the telephone. I suggested choosing the PICC line to be placed on the right arm in order to be able to deliver antibiotic therapy according to the proper indication. She completed the PICC line and it was removed last week with no complications or problems. The right upper extremity is completely normal. It is difficult for me to know why she developed the septic arthritis. She is in contact with horses all the time. The pathogen that she had in the joint is very uncommon in my opinion and the patient had no obvious source including no sinuses, no dental source, no obvious cardiac source, no gastrointestinal or genitourinary source and no skin source. It raises the question if this pathogen could evade to colonic source and I think I feel the obligation for this patient to have at least a CT scan of the abdomen and pelvis and eventually in several months from now consider a colonoscopy. Another consideration could be a Cologuard in some way. At least the patient’s infection seems to be under control. Her joints are still sore today including left shoulder, left ankle. Local inflammatory signs are very much gone. The only area of inflammation that she has in the 1st MP joint on the left hand probably represents osteoarthritis, no more than that. She will remain in observation from this point of view.  On 09/14/2022 the patient has no symptoms that would suggest any further spreading or new development of septic arthritis. My fear was related to the possible seeding of her hip replacement areas by bacteria  during the bacteremia that she had not too long ago. It seems that that is not the case. Radiologically speaking I do not see any local inflammation or reaction in any of these anatomical sites. There is perfect symmetry in the hip areas in regard to all her hardware material. Her sedimentation rate is BACK TO normal. Her white count is normal and this process will be watched in absence of any other intervention.  On 10/25/2022 no new episodes of septic arthritis. I measured her sedimentation rate during the previous visit that was down to 9, previously was in the 8-9 category. This means resolution of an inflammatory process altogether.   On 12/28/2022 at this time she is experiencing her typical symptom of osteoarthritis in her hands and hips but no issues pertinent to septic arthritis whatsoever. Deformities in the hands are ongoing due to osteoarthritis with no other issues or problems. No lymphedema in upper extremities.   On 03/29/2023 no significant sequela from her multiple foci and septic arthritis. What she has now is just typical osteoarthritis symptomatology and she uses Tylenol for this and occasional ibuprofen.         •   ·   4.The patient developed atrial fibrillation with rapid ventricular response during the hospitalization with septic arthritis. Echocardiogram documented very dilated left atrium. She is now receiving Eliquis, metoprolol, and digoxin. Her ventricular response is controlled today but she remains in AFib. She has requested a followup with Dr. Odell and I went ahead and made her an appointment. 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 If she had a fall and she is taking anticoagulants the only thing that we are going to have is just trouble. She has sold one of the horses. She will sell the other horse very soon and she will remain on land for the time being. Her  is back in town and he  is helping her with consecution of groceries and things of this nature under the present circumstances. I advised her to minimize any trauma.  On 09/14/2022 the patient will be seen by Electrophysiology tomorrow in regard to consideration of cardioversion for her AFib. She has discussed this with Dr. Odell and I have reviewed this data. That is perfectly fine from my point of view. I find no form of contraindication from my side of the story if this is the methodology that is chosen to try to revert her to normal sinus rhythm.  On 10/25/2022 the patient is in normal sinus rhythm today. She remains on anticoagulants. She has cardiology appointment tomorrow. I asked her to buy a pulse oximeter and I taught her how to interpret the little wave curve of the pulse oximeter in regard to her heart rate. Typically patients in A-fib have very irregular pulse chaotic and the little wave curves will be continuously changing and besides the pulse rate will be continuously changing depending on the speed of the process. That is very simple to interpret. If se develops that problem I advised her to call her cardiologist.   On 12/28/2022 today she is in normal sinus rhythm by cardiac auscultation. Echocardiogram to be done by Danni dOell MD, in the next few days and further recommendations at that point. We strongly advised the patient about the continuation of Eliquis.   On 03/29/2023 clinically her heart obeys to normal sinus rhythm.         •   ·   5.The patient has a grade 3 lymphedema in the left upper extremity so tight in the sleeve that she had them done before is not even fitting. She has no obvious infection as far as I can tell. I went and made an urgent referral to the Lymphedema Clinic today to see if further bandage and drainage of this and massage would be helpful to her in the long run to control the problem. I still advised her to be extremely careful in regard to any injury or lesions in the skin of the left upper  extremity to minimize any potentiality of cellulitis. In the long run if she has any episodes she will need to be back on antibiotics right away.  •  On 09/14/2022 the patient's lymphedema in the left upper extremity continues. She already has an appointment to be seen by the lymphedema clinic, she will require a new sleeve and massage and interventional therapy for this. She is aware that she needs to avoid any trauma in the left upper extremity to minimize any cellulitis. I strongly believe as posted before that septic arthritis is part of her lymphedema issues.   On 10/25/2022 her lymphedema in the left upper extremity is very much resolved with the sleeve and all of the manipulation that she has had in the lymphedema clinic. I advised her to be very prudent in regard doing any nicking in the skin and damaging the skin pertinent to the left upper extremity to minimize cellulitis and potentiality for further problems.   On 12/28/2022 no lymphedema in either extremity. No need for sleeve use at this time.   On 03/29/2023 the patient has no leftover lymphedema in either extremity.        •   ·   6.In regard to hypertension management I have controlled this before. Now she is taking quite amount of metoprolol. I will give up the management of this and now that she will see Dr. Odell, they will provide the care of this issue. I would not be surprised if the patient in the long run needs to be receiving another blood pressure medication given the fact that her blood pressure is still marginally elevated today. Taking digoxin, I do not think Lasix or hydrochlorothiazide will be a good choice because of the potential for hypokalemia unless the potassium is replaced and monitored very close. This will probably minimize the potentiality for digoxin toxicity.  • On 09/14/2022 her blood pressure is controlled with the metoprolol that is provided by the cardiology team. She was advised again to avoid antiinflammatory medication  for pain control.   On 10/25/2022 her blood pressure is under good control and I advised her to continue taking her blood pressure medication and once more I advised against the use of any antiinflammatory medication by oral route.   On 12/28/2022 blood pressure under good control, medications will remain the same.  On 03/29/2023 her blood pressure remains under excellent control.         •   ·     7.The patient used to take copious amount of anti-inflammatory medication for arthritis, aches and pains. Given the fact that she has now been placed on anticoagulant, she is aware that anticoagulant and anti-inflammatory medicines are not good friends. The only medicine that she can take for pain is Tylenol. If the pain is bad enough she will require a prescription for Lortab that she is no longer taking.    8.On 08/16/2022 given the persistency of the pain in the left hip no matter what she does, I went ahead and scheduled a CT scan of the pelvis to be done before her next visit. Hopefully this will allow me to delineate the anatomy of this and I want to pray that this joint was not BACTERIAL SEEDING during the time of bacteremia and septic arthritis. So far on clinical grounds her white count is normal. We have requested a sedimentation rate to be done today and this will need to be monitored. She states that this hip goes out of socket frequently and she has to end up in the emergency room.     I discussed all these facts with the patient’s brother who came all the way from [place]. I reviewed the records pertinent to this patient's care including all the records available in [facility] from the recent hospitalization at [facility] and I discussed the case with her infectious disease physician.  On 09/14/2022 I do not see anything that suggests any spreading of inflammatory infectious process throughout her skeleton anymore. Her hips look very symmetric in regard to the hardware and she has no symptoms that suggest  any new infection with a normal white count today and pending sedimentation rate.  On 10/25/2022 the left hip remains painful especially when she first wakes up in the morning, thereafter warms up and she feels better. She has not had any luxation of the hip in the last several months.  On 03/29/2023 minimal use of antiinflammatory medication.       •   9.This patient has very significant insomnia. The problem is what to provide her for this problem at this time. There are cardiac issues. I do believe that this patient will be better off at least for the next 6 weeks taking a benzodiazapine or something of this nature more than any other medication. I do not feel compelled to give her gabapentin that actually has not worked for her in the past. Therefore I went ahead and prescribed for her medication in this regard today to take it at bedtime to see if this allows her to sleep properly.     On 10/25/2022 the patient persists having insomnia not sleeping more than 3 hours taking Ambien. I went ahead and discontinued this medicine. I gave her Seroquel 25 mg tablets to take 1 orally nightly. I asked her to call my nurse Priya Gonzalez next week and let us know how she is doing in that arena.     I will review her back in 2 months with a CBC, CMP and a CA 15-3. I advised her to buy the pulse oximeter as stated. I discusses these facts also with the patient's brother present in the room.   On 12/28/2022 I do believe that the patient overall is doing fantastic and I made the summary on each one of the points discussed above. I will review her back in 3 months with a CBC, CMP and CA 15-3. I find no need for radiological assessment at this time unless that the clinical circumstances change.   The patient still has an element of insomnia but she will speed up the process of more physical activity that will probably trigger better sleep. We encouraged her to remain active like she is doing now. 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 We encouraged her to remain doing this. I discussed all of these facts with the patient and her brother in the room.        ·

## 2023-03-30 ENCOUNTER — TELEPHONE (OUTPATIENT)
Dept: ONCOLOGY | Facility: CLINIC | Age: 63
End: 2023-03-30
Payer: MEDICARE

## 2023-03-30 NOTE — TELEPHONE ENCOUNTER
----- Message from Elie Dixon MD sent at 3/29/2023  6:33 PM EDT -----  CALL PT EXCELLENT CA 15-3 AT 14 TREATMENT THE SAME

## 2023-06-01 NOTE — PROGRESS NOTES
[facility]  Referral received. Spoke with pt. Acute rehab discussed. Encouraged pt to continue to participate with rehab- both PT and OT to help with Acute Rehab eval. Pt lives with spouse who works and would be able to be there at times and could help with light assistance, but she would essentially need to go home modified independent. On 7/3 she was unable to take steps noted- unable to tolerate weight bearing through Lt [facility].   Spoke with RN who will reach out to both PT and OT for continued assessments and will follow progress with therapy    Kaylie Valverde RN  Acute Rehab Admission Nurse   PAST MEDICAL HISTORY:  HLD (hyperlipidemia)     Hypertension     Type 2 diabetes mellitus

## 2023-06-05 DIAGNOSIS — Z17.0 MALIGNANT NEOPLASM OF OVERLAPPING SITES OF LEFT BREAST IN FEMALE, ESTROGEN RECEPTOR POSITIVE: ICD-10-CM

## 2023-06-05 DIAGNOSIS — C50.812 MALIGNANT NEOPLASM OF OVERLAPPING SITES OF LEFT BREAST IN FEMALE, ESTROGEN RECEPTOR POSITIVE: ICD-10-CM

## 2023-06-05 RX ORDER — EXEMESTANE 25 MG/1
TABLET ORAL
Qty: 30 TABLET | Refills: 3 | Status: SHIPPED | OUTPATIENT
Start: 2023-06-05

## 2023-07-24 ENCOUNTER — TELEPHONE (OUTPATIENT)
Dept: CARDIOLOGY | Facility: CLINIC | Age: 63
End: 2023-07-24
Payer: MEDICARE

## 2023-07-24 NOTE — TELEPHONE ENCOUNTER
Notified patient of results. Patient verbalized understanding. She denies any palpitations.     Jayne Bonner RN  Triage [facility] [place]

## 2023-07-24 NOTE — TELEPHONE ENCOUNTER
Nurses, please let the patient know stress test did not indicate blockage and was felt to be low risk study.  However the strength of the heart appears slightly weak and this is in part because atrial fibrillation seems to have recurred.  Please see if she is feeling any palpitations.  And will let Dr. Gustafson know.    [place],  Looks like she is back in atrial fibrillation she had been in sinus 2 days earlier.  Recommendations?

## 2023-09-27 ENCOUNTER — OFFICE VISIT (OUTPATIENT)
Dept: ONCOLOGY | Facility: CLINIC | Age: 63
End: 2023-09-27
Payer: MEDICARE

## 2023-09-27 ENCOUNTER — LAB (OUTPATIENT)
Dept: LAB | Facility: HOSPITAL | Age: 63
End: 2023-09-27
Payer: MEDICARE

## 2023-09-27 VITALS
SYSTOLIC BLOOD PRESSURE: 130 MMHG | TEMPERATURE: 98 F | OXYGEN SATURATION: 98 % | BODY MASS INDEX: 28.87 KG/M2 | HEIGHT: 65 IN | DIASTOLIC BLOOD PRESSURE: 87 MMHG | WEIGHT: 173.3 LBS | HEART RATE: 82 BPM

## 2023-09-27 DIAGNOSIS — C50.812 MALIGNANT NEOPLASM OF OVERLAPPING SITES OF LEFT BREAST IN FEMALE, ESTROGEN RECEPTOR POSITIVE: ICD-10-CM

## 2023-09-27 DIAGNOSIS — K71.6 DRUG-INDUCED HEPATITIS: ICD-10-CM

## 2023-09-27 DIAGNOSIS — Z17.0 MALIGNANT NEOPLASM OF OVERLAPPING SITES OF LEFT BREAST IN FEMALE, ESTROGEN RECEPTOR POSITIVE: Primary | ICD-10-CM

## 2023-09-27 DIAGNOSIS — I48.19 PERSISTENT ATRIAL FIBRILLATION: ICD-10-CM

## 2023-09-27 DIAGNOSIS — I10 PRIMARY HYPERTENSION: ICD-10-CM

## 2023-09-27 DIAGNOSIS — T50.905A DRUG-INDUCED HEPATITIS: ICD-10-CM

## 2023-09-27 DIAGNOSIS — F51.01 PRIMARY INSOMNIA: ICD-10-CM

## 2023-09-27 DIAGNOSIS — C50.812 MALIGNANT NEOPLASM OF OVERLAPPING SITES OF LEFT BREAST IN FEMALE, ESTROGEN RECEPTOR POSITIVE: Primary | ICD-10-CM

## 2023-09-27 DIAGNOSIS — Z17.0 MALIGNANT NEOPLASM OF OVERLAPPING SITES OF LEFT BREAST IN FEMALE, ESTROGEN RECEPTOR POSITIVE: ICD-10-CM

## 2023-09-27 LAB
ALBUMIN SERPL-MCNC: 4.3 G/DL (ref 3.5–5.2)
ALBUMIN/GLOB SERPL: 1.8 G/DL
ALP SERPL-CCNC: 97 U/L (ref 39–117)
ALT SERPL W P-5'-P-CCNC: 13 U/L (ref 1–33)
ANION GAP SERPL CALCULATED.3IONS-SCNC: 13.2 MMOL/L (ref 5–15)
AST SERPL-CCNC: 22 U/L (ref 1–32)
BASOPHILS # BLD AUTO: 0.05 10*3/MM3 (ref 0–0.2)
BASOPHILS NFR BLD AUTO: 1.1 % (ref 0–1.5)
BILIRUB SERPL-MCNC: 0.5 MG/DL (ref 0–1.2)
BUN SERPL-MCNC: 15 MG/DL (ref 8–23)
BUN/CREAT SERPL: 16.3 (ref 7–25)
CALCIUM SPEC-SCNC: 9.6 MG/DL (ref 8.6–10.5)
CHLORIDE SERPL-SCNC: 104 MMOL/L (ref 98–107)
CO2 SERPL-SCNC: 22.8 MMOL/L (ref 22–29)
CREAT SERPL-MCNC: 0.92 MG/DL (ref 0.6–1.1)
DEPRECATED RDW RBC AUTO: 50.9 FL (ref 37–54)
EGFRCR SERPLBLD CKD-EPI 2021: 69.2 ML/MIN/1.73
EOSINOPHIL # BLD AUTO: 0.12 10*3/MM3 (ref 0–0.4)
EOSINOPHIL NFR BLD AUTO: 2.6 % (ref 0.3–6.2)
ERYTHROCYTE [DISTWIDTH] IN BLOOD BY AUTOMATED COUNT: 14.2 % (ref 12.3–15.4)
GLOBULIN UR ELPH-MCNC: 2.4 GM/DL
GLUCOSE SERPL-MCNC: 93 MG/DL (ref 65–99)
HCT VFR BLD AUTO: 44.4 % (ref 34–46.6)
HGB BLD-MCNC: 14.1 G/DL (ref 12–15.9)
IMM GRANULOCYTES # BLD AUTO: 0.02 10*3/MM3 (ref 0–0.05)
IMM GRANULOCYTES NFR BLD AUTO: 0.4 % (ref 0–0.5)
LYMPHOCYTES # BLD AUTO: 1.33 10*3/MM3 (ref 0.7–3.1)
LYMPHOCYTES NFR BLD AUTO: 28.9 % (ref 19.6–45.3)
MCH RBC QN AUTO: 30.5 PG (ref 26.6–33)
MCHC RBC AUTO-ENTMCNC: 31.8 G/DL (ref 31.5–35.7)
MCV RBC AUTO: 96.1 FL (ref 79–97)
MONOCYTES # BLD AUTO: 0.33 10*3/MM3 (ref 0.1–0.9)
MONOCYTES NFR BLD AUTO: 7.2 % (ref 5–12)
NEUTROPHILS NFR BLD AUTO: 2.75 10*3/MM3 (ref 1.7–7)
NEUTROPHILS NFR BLD AUTO: 59.8 % (ref 42.7–76)
NRBC BLD AUTO-RTO: 0 /100 WBC (ref 0–0.2)
PLATELET # BLD AUTO: 210 10*3/MM3 (ref 140–450)
PMV BLD AUTO: 8.5 FL (ref 6–12)
POTASSIUM SERPL-SCNC: 5.3 MMOL/L (ref 3.5–5.2)
PROT SERPL-MCNC: 6.7 G/DL (ref 6–8.5)
RBC # BLD AUTO: 4.62 10*6/MM3 (ref 3.77–5.28)
SODIUM SERPL-SCNC: 140 MMOL/L (ref 136–145)
WBC NRBC COR # BLD: 4.6 10*3/MM3 (ref 3.4–10.8)

## 2023-09-27 PROCEDURE — 80053 COMPREHEN METABOLIC PANEL: CPT

## 2023-09-27 PROCEDURE — 36415 COLL VENOUS BLD VENIPUNCTURE: CPT

## 2023-09-27 PROCEDURE — 85025 COMPLETE CBC W/AUTO DIFF WBC: CPT

## 2023-09-27 NOTE — PROGRESS NOTES
Subjective     REASON FOR FOLLOW UP:  1. 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 R BREAST MASS WITH GIANT R AXILLARY ADENOPATHY.  SHE UNDERWENT DOSE DENSE AC, TAXOL, BILATERAL MASTECTOMIES, DRAMATIC NEAR COMPLETE AL, RADIATION THERAPY AND ADJUVANT FEMARA STOPPED ON 12/21 BECAUSE DRUG INDUCED HEPATITIS, SWITCHED TO AROMASIN 2/22: NO SIDE EFFECTS.    3. FAMILY HISTORY OF BREAST CANCER IN MOTHER AND SISTER, SISTER WAS TESTED FOR BRCA AND WAS NEGATIVE.    4. LEFT OVARIAN CYST , IT HAS BEEN PRESENT FOR LONG TIME BUT IS GETTING LARGER, ASYMPTOMATIC, NEED FOR , PELVIC US AND GYN ONC EVal: no need for any intervention                  5. LEFT HIP PAIN SEVERE ARTHRITIS, REAL NEED FOR LEFT HIP REPLACEMENT: PERFORMED 8/21    6. DRUG INDUCED HEPATITIS,  FINALLY STOPPED FEMARA: DRAMATIC IMPROVEMENT AND RESOLUTION.      History of present illness:    On 09/27/2023 this 63-year-old female returns to the office for follow up of her high risk breast cancer bilateral. The patient remains on Aromasin in the adjuvant setting not showing any clinical evidence of recurrent disease. The patient underwent assessment by, Danni Odell MD, Cardiologist for a cardiac stress testing and at the time that she received the IV infusion in preparation for that she went into atrial fibrillation and has remained in atrial fibrillation since then. She has not had any symptoms pertinent to the atrial fibrillation including no shortness of breath, chest pain, palpitations or swelling in her lower extremities. She has not had a nocturnal cough or paroxysmal nocturnal dyspnea. On the other hand she is still waiting to see what is going to happen with the appointment to see, Manjeet Gustfason MD.     In regard to her history of breast cancer there is no indication of recurrent disease clinically. The patient is asymptomatic with normal chest wall, proper appetite, proper bowel activity, proper urination. Minimal joint  pain at this time. Aromasin is ongoing with 100% compliance with no side effects. Her blood pressure is under good control. She also has had some vertigo that has become an issue again.        Past Medical History:   Diagnosis Date    Anemia in neoplastic disease     Arthritis     Arthritis of back     Arthritis of neck     Breast cancer     Left    CVA (cerebral vascular accident)     Fracture, fibula     Fracture, finger     Frozen shoulder     Hip arthrosis 01/17    Hip pain     RIGHT HIP... CYST    History of fracture of leg 1987    History of radiation therapy     LAST TREATMENT      Hypertension     Knee swelling     Limited joint range of motion (ROM)     RIGHT HIP    Low back strain     Periarthritis of shoulder     Rotator cuff syndrome 6/22    Skin sore     OPEN SORE LEFT BREAST    Syncope     Vertigo            Past Surgical History:   Procedure Laterality Date    AXILLARY LYMPH NODE BIOPSY/EXCISION Right     LYMPH NODE UNDER RIGHT ARM-MALIGNANT (DOUBLE MASTECTOMY)    BREAST BIOPSY Left     MALIGNANT    FRACTURE SURGERY  1987    Leg    HARDWARE REMOVAL      INCISION AND DRAINAGE LEG Left 06/30/2022    Procedure: INCISION AND DRAINAGE left ankle;  Surgeon: Kenneth Tran MD;  Location: [facility];  Service: Orthopedics;  Laterality: Left;    JOINT REPLACEMENT  mar 2020,july 2021    hips    MASTECTOMY W/ SENTINEL NODE BIOPSY Bilateral 09/16/2019    Procedure: BILATERLA MODIFIED RADICAL MASTECTOMY WITH BILATERAL SENTINEL LYMPH NODE BIOPSY;  Surgeon: Joby Barron Jr., MD;  Location: [facility];  Service: General    TOTAL HIP ARTHROPLASTY Right 03/02/2020    Procedure: RIGHT TOTAL HIP ARTHROPLASTY NATALY NAVIGATION;  Surgeon: Luis M Leonard MD;  Location: [facility];  Service: Orthopedics;  Laterality: Right;    TOTAL HIP ARTHROPLASTY Left 07/20/2021    Procedure: Posterior LEFT TOTAL HIP ARTHROPLASTY NATALY NAVIGATION;  Surgeon: Luis M Leonard MD;  Location: [facility];   Service: Orthopedics;  Laterality: Left;    VENOUS ACCESS DEVICE (PORT) INSERTION Right 02/01/2019    Procedure: INSERTION VENOUS ACCESS DEVICE;  Surgeon: Joby Barron Jr., MD;  Location: [facility], [place], operated by [facility];  Service: General    VENOUS ACCESS DEVICE (PORT) REMOVAL N/A 10/30/2019    Procedure: Mediport Removal;  Surgeon: Joby Barron Jr., MD;  Location: [facility];  Service: General        Current Outpatient Medications on File Prior to Visit   Medication Sig Dispense Refill    acetaminophen (TYLENOL) 325 MG tablet Take 2 tablets by mouth Every 6 (Six) Hours As Needed.      apixaban (ELIQUIS) 5 MG tablet tablet Take 1 tablet by mouth Every 12 (Twelve) Hours. Indications: Atrial Fibrillation 60 tablet 5    Cholecalciferol 250 MCG (76289 UT) tablet Take 1 tablet by mouth. As directed      Diclofenac Sodium (VOLTAREN) 1 % gel gel Apply 4 g topically to the appropriate area as directed 4 (Four) Times a Day As Needed. RARE PRN      exemestane (AROMASIN) 25 MG tablet TAKE ONE TABLET BY MOUTH DAILY 30 tablet 3    polyethylene glycol (polyethylene glycol) 17 g packet Take 17 g by mouth 2 (Two) Times a Day. (Patient taking differently: Take 17 g by mouth 2 (Two) Times a Day As Needed.)      sodium chloride 0.65 % nasal spray   1 Spray, Nasal, Drops, Q4H, PRN Nasal Congestion, 0 Refill(s)      tiZANidine (ZANAFLEX) 4 MG tablet Take 1 tablet by mouth Every 6 (Six) Hours As Needed for Muscle Spasms.      metoprolol succinate XL (TOPROL-XL) 50 MG 24 hr tablet Take 1 tablet by mouth 2 (Two) Times a Day for 180 days. 60 tablet 5    [DISCONTINUED] digoxin (LANOXIN) 125 MCG tablet Take 1 tablet by mouth Daily.       Current Facility-Administered Medications on File Prior to Visit   Medication Dose Route Frequency Provider Last Rate Last Admin    Chlorhexidine Gluconate Cloth 2 % pads 1 each  1 each Apply externally Take As Directed Luis M Leonard MD            ALLERGIES:    Allergies   Allergen Reactions    Benadryl  "[Diphenhydramine] Itching     INCREASES BLOOD PRESSURE    Erythromycin GI Intolerance    Levaquin [Levofloxacin] GI Intolerance    Penicillins GI Intolerance        Social History     Socioeconomic History    Marital status:      Spouse name: Cruz    Number of children: 0   Tobacco Use    Smoking status: Never    Smokeless tobacco: Never   Vaping Use    Vaping Use: Never used   Substance and Sexual Activity    Alcohol use: Yes     Comment: occasional    Drug use: No    Sexual activity: Not Currently     Partners: Male     Birth control/protection: Abstinence        Family History   Problem Relation Age of Onset    Lung cancer Father     Irritable bowel syndrome Father     Cancer Mother     Breast cancer Mother     Liver disease Mother     Breast cancer Sister 48    Breast cancer Maternal Grandmother     Breast cancer Paternal Grandmother     Breast cancer Maternal Uncle     Malig Hyperthermia Neg Hx             Objective     Vitals:    09/27/23 1201   BP: 130/87   Pulse: 82   Temp: 98 °F (36.7 °C)   TempSrc: Temporal   SpO2: 98%   Weight: 78.6 kg (173 lb 4.8 oz)   Height: 165.1 cm (65\")   PainSc: 0-No pain           9/27/2023    11:59 AM   Current Status   ECOG score 0     Exam:         GENERAL:  Well-developed, Patient  in no acute distress.   SKIN:  Warm, dry ,NO purpura ,no rash.  HEENT:  Pupils were equal and reactive to light and accomodation, conjunctivae noninjected,  normal visual acuity.   NECK:  Supple with good range of motion; no thyromegaly , no JVD or bruits,.No carotid artery pain, no carotid abnormal pulsation   LYMPHATICS:  No cervical, NO supraclavicular, NO axillary, NO inguinal adenopathies.  CARDIAC   normal rate , irregular rhythm, afib cvr, without murmur,NO rubs NO S3 NO S4   LUNGS: normal breath sounds bilateral, no wheezing, NO rhonchi, NO crackles ,NO rubs.Chest wall discloses bilateral mastectomies and telangectasia's associated with previous radiation therapy. There are no " masses, nodularities or tenderness and there is no axillary adenopathy. There is minimal grade 1 lymphedema in the left upper extremity.      VASCULAR VENOUS: no cyanosis, NO collateral circulation, NO varicosities, NO edema, NO palpable cords, NO pain,NO erythema, NO pigmentation of the skin.  ABDOMEN:  Soft, NO pain,no hepatomegaly, no splenomegaly,no masses, no ascites, no collateral circulation,no distention.  EXTREMITIES  AND SPINE:  No clubbing, no cyanosis ,oa hands deformities , no pain .No kyphosis,  no pain in spine, no pain in ribs , no pain in pelvic bone.  NEUROLOGICAL:  Patient was awake, alert, oriented to time, person and place.      RECENT LABS:  Hematology Results from last 7 days   Lab Units 09/27/23  1128   WBC 10*3/mm3 4.60   NEUTROS ABS 10*3/mm3 2.75   HEMOGLOBIN g/dL 14.1   HEMATOCRIT % 44.4   PLATELETS 10*3/mm3 210                      Assessment & Plan    1.  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   For at least 4 years, she has had an in crescendo mass in the left breast that has produced a gigantic tumor that was close to 25 cm in size and is replacing most of the anatomy of the left breast. There are areas of ulceration necrosis and bleeding and the mass is fixed to the chest wall. She has abundant amount of collateral circulation in the left anterior chest wall and it caught my attention the lack of any left axillary adenopathy. She has no lymphedema in the left upper extremity. In the right breast while in sitting position, no palpable abnormalities are documented. The right breast skin and nipple are normal. When the patient lies flat, there is a palpable mass at 9 o'clock from the nipple that measures probably 4 cm in size, very indistinct in regard to edges and margins. There was no mobility and no areas of tenderness. She has a giant adenopathy in the right axilla that measures  close to 9 cm in size, uniform, no fluctuation, nontender, completely fixed to the axillary wall. There is no compromise of the skin.   Patient completed 4 cycles of neoadjuvant AC on 4/12/2019.    Patient completed neoadjuvant weekly Taxol x 12 treatments on 7/19/2019.  9/16/2019 bilateral mastectomy by Dr. Joby Barron with axillary lymph node dissection.  No residual malignancy.  10/30/2019 patient's Mediport was removed in anticipation of radiation.  Radiation therapy under the care of Dr. Marmolejo 10/31/2019-1/13/2020 to the right chest wall.  Started on adjuvant Femara 2.5 mg daily 8/20/2019.  9/21/2020 continues on adjuvant Femara, tolerating it quite well.  Some mild hot flashes and mild arthralgias, all which are tolerable.  The patient returned to the office on 05/24/2021. Since the previous visit the patient proceeded with a PET scan and I have reviewed this with her in the PAC system showing chest wall on the left side area of enlightening SUV activity that is almost 3 cm long x 7 mm wide. It is behind the bone. It is very close to the lower aspect of her heart. The reset of the PET scan discloses no activity. This is also specific in regard to the ovaries and actually an ultrasound of her vagina looking into the ovaries documented an unilocular cyst on the left side with typical features of benignity and a  was completely normal 5.5.   The patient was further reviewed on 07/07/2021 after she has continued her Kisqali and completion of the 1st round of the medicine. Today her EKG disclosed a normal QT interval and this has been sent to Cardiology to be reported officially. 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 She has completed her radiation therapy to the epicardial right lymph node with no difficulties or side effects.   The patient was further reviewed on 09/30/2021. Patient has recovered well from her left hip replacement. She completed her Kisqali a week  ago. Her white count is low today. The hemoglobin is stable. The platelet count is stable. Elevation in her liver function test, SGOT, SGPT noted. The patient is not drinking any alcohol. She is not taking any cholesterol medicine. She is not taking any anti-inflammatory medication and leads me to think that Kisqali is the culprit of this. Given these findings we advised the patient to put the Kisqali on hold until we recheck chemistry profile on weekly basis for the next 3 or 4 weeks. We need to settle these numbers down before she continues the Kisqali. She probably will require dose adjustment at some point. Again, her hepatitis A, B and C serologies are negative.   n regard to her history of breast cancer she was reviewed on 11/19/2021. Since the previous visit the patient has had tumor marker CA 15-3 that has dropped to 20. Radiological assessment of her chest and abdomen CT scan that documents resolution of the epicardial lymphadenopathy in the right hemithorax, no pulmonary nodules or metastasis, no pleural effusion and no liver metastasis. No bone metastasis. Based on this information I do believe that the breast cancer is relatively quiet clinically, biochemically and radiologically and I advised her today to resume her Femara at the dose of 2.5 mg a day. The likelihood of Femara producing liver dysfunction is more than remote.   The patient was further reviewed on 12/29/2021. Recent review of her liver function tests a few days ago documented persistent elevation of her SGOT, SGPT, and alkaline phosphatase. She has discontinued most of the medicines and I have no other choice but discontinue the Femara. Since then and actually today is the 1st day in many weeks that the SGOT and SGPT and alkaline phosphatase are improving. The patient actually remains asymptomatic in this regard. Her liver is not enlarged. She has no jaundice. She has no rash, no skin lesions and no other new alterations. That is good  news. Her white blood count, hemoglobin and platelets remain stable. 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 That is extremely rare. As I posted above, I discussed with all my partners in regard to how many times through their careers prescribing Femara to multiple breast cancers they have seen Femara triggering hepatitis, none of them gave me a positive answer. I reviewed this information in UpToDate and it is reported in less than 1% of patients taking this medication. I think we are in front of a rare situation but I want for her to withhold any further Femara treatment for the time being and I want to review her back in 3 weeks. If the liver function tests continue improving or normalize by then maybe we will have the answer once and for all. Raises the question what we will do next and I think the next medicine will be the utilization of Aromasin that has a different chemical component and different methodology for action. I made her aware of that. We are not going to go back in giving her Kisqali under the present circumstances given the confusion and all the issues pertinent to her liver dysfunction.   The patient was further reviewed on 03/07/2022. Since the previous visit the patient has stopped the Femara in 12/2021 and today her liver function tests are completely back to normality. This proves the point that Femara was the culprit phenomenon that I never have seen and discussed with my partner, Danelle Cespedes MD. He never has seen this neither.   The patient returned on 05/09/2022 with no symptoms of anything in particular besides minimal respiratory allergies and pain in the left hip associated with her previous surgery. Her clinical examination today is very much unremarkable, she looks fantastic. She has no symptoms or signs of breast cancer recurrence.  White count, hemoglobin and platelets are normal. Her tumor marker CA 15-3 has remained normal and actually lower than ever and compliance with Aromasin has been 100% with excellent tolerance.   Given the circumstances of this I advised her to remain on Aromasin 25 mg a day and I find no use for this patient to go back onto Kisqali or medicines of this nature.   On 08/16/2022 we reviewed the patient in regard to her history of breast cancer. Clinically she has not had any obvious recurrent disease in the chest wall in the lung anatomy or abdomen or pelvis. She remains on Aromasin adjuvantly and we are waiting to review tumor marker. She has a chest wall that is completely flat due to previous mastectomies and radiation therapy. There is no axillary adenopathies and the patient has in my opinion control of her disease process as far as we can tell. I advised her to remain on Aromasin 25 mg a day. I went ahead and scheduled a visit with us in a few weeks in order to further review radiological analysis that will be discussed below.  On 09/14/2022 the patient has had in the interim a CT scan of the chest, abdomen and pelvis for review personally. Her pulmonary anatomy remains normal, there is no pulmonary congestion, pleural effusions, pericardial effusion, cardiomegaly, hilar or mediastinal adenopathy. There is prepericardiac lymph node measuring almost 1.5 to 2 cm in size that is flat like a small finger that has been present before and has minimally enlarged. If this has anything to do with her previous history of breast cancer is difficult to state but this has been evaluated before with similarity in regards size and some degree of fluctuation. I think this does not constrain me from thinking with a normal tumor marker of CA 15-3 of 20 during the previous visit or compel to perform either removal or biopsy at this time or proceed with radiological assessment with a PET scan. I do believe that this is not  representation of anything in particular. Her liver anatomy and the rest of the bony anatomy, pancreas, kidneys, spleen and retroperitoneum remain unchanged. Given this finding I advised the patient to proceed and continue her Aromasin without the need to add any other medication to her regimen of medication especially in the background of A-fib. I advised her to proceed with reassessment with me in 6 weeks with a repeat CBC, CMP and CA 15-3. In that moment also we will obtain a liquid biopsy for further analysis. Her liver function test has remained normal and Aromasin has not produced any chemical hepatitis. I advised her again to remain on this medicine by itself.   On 10/25/2022 the patient remains on Aromasin. She has no symptoms or signs that would indicate breast cancer recurrence and the latest tumor marker CA 15-3 was 20 two months ago. We have another one pending today. Given these circumstances I advised her to remain on Aromasin for the time being. I find no need for radiological assessment at this time. She will be called at home with the report of her tumor marker.   On 12/28/2022 the patient had no symptoms or signs of breast cancer progression or recurrence on any level. Tolerance to Aromasin is excellent with no side effects and she has 100% compliance on the medication. She was advised to remain on the medication. Her tumor marker CA 15-3 has remained normal. No plans for radiological assessment unless new clinical changes occur or tumor marker changes.   On 03/29/2023 the patient has no symptoms or signs of breast cancer recurrence. She continues taking her aromatase inhibitor medicine on a routine basis with 100% compliance and no significant side effects from the medication. Clinically she has no evidence of breast cancer recurrence and during the previous visit her CA 15-3 remained normal, another one is pending today that will be discussed with her on the telephone once it becomes available.  Given the stability of her clinical picture and the excellent clinical status I advised the patient to remain on her medicine for the time being returning to see us back every 3 months with a CBC, CMP and CA 15-3. I find no need for radiological assessment on her at this point.   6/27/2023: Patient reviewed back today in office. She does not have any clinical findings suggestive of malignancy reoccurrence. She remains on Aromasin daily. Labs reviewed. CBC and CMP are both normal. CA 15-3 is normal at 18.5. Patient encouraged to become more active to help with weight gain and arthritis.   On 09/27/2023 the patient was further reviewed. She has no symptoms or signs of breast cancer recurrence. Her chest wall is unremarkable, the lymphedema in the left upper extremity is a grade 1 at the most controlled and she has no need for any elastic support. She has not developed any cellulitis or infection in the left upper extremity. The patient is taking Aromasin adjuvantly 100% compliance, no evidence of recurrent disease. Normal white count, hemoglobin and platelets pending chemistry profile. We advised her to remain on Aromasin 25 mg a day. She will be returning to see us back in 3 months with repeat CBC, CMP and CA 15-3. No need for radiological assessment on her at this point.      2. Left hip pain.   05/17/2021: Patient reports new complaint of progressive discomfort and the inability to function given the pain in the left hip area. Now she is limping. The only time when she is not in discomfort with the left hip is when she is sitting or lying in bed or riding the horse when gravity takes away the pressure of her left hip area. Radiologically speaking the CT scan of the chest, abdomen and pelvis to my eyes disclosed no abnormalities besides a very simple ovarian cyst that measures close to 7 x 5 cm with no trabeculation. There is no pelvic ascites. There is no pelvic adenopathy. The other abnormality obviously visible  is the severe degree of scoliosis of the thoracic, lumbar spines.   It caught my attention the subchondral cyst in the left hip and the minimal if any space leftover between the head of the femur and the acetabulum. There is no metastasis in this anatomical site.   The radiologist mentioned cardiophrenic angle lymphadenopathy. I cannot see that from my naked eyes. I do not know what it is and I do not know how to express it. Pulmonary anatomy is normal. Hilar adenopathy is normal. No pericardial effusion. No pleural effusion. Minimal infiltrate in the lungs related to radiation changes. Liver anatomy, pancreas and kidneys were unremarkable. The scoliosis is very obvious in the view of the abdomen.   Patient refereed with Dr. Leonard, orthopedic surgeon.   Patient underwent left hip replacement and recovered well.   On 09/27/2023 her left hip pain is very minimal at this time, she is not limping. 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 Her osteoarthritis in her hands is still a prevalent issue with aches and pains periodically. No need for pain medicine at this time besides Tylenol.       3. Ovarian cyst  05/17/2021: Her CA 15-3 was minimal elevated in comparison to previous assessment and pelvic ultrasound ordered. t raises the following question.   05/24/2021: This is also specific in regard to the ovaries and actually an ultrasound of her vagina looking into the ovaries documented an unilocular cyst on the left side with typical features of benignity and a  was completely normal 5.5.   6/27/2023: CA 15-3 is normal at 18.5.  On 09/27/2023 no issues pertinent to this. This will remain in observation.      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   Her liver enzymes are completely normal today. She remains on Aromasin and obviously this patient never will be back on letrozole under any circumstances. This was documented through liver  biopsy and through removal of the medication that triggered quick improvement in her liver function test.   On 09/14/2022 there is no evidence of drug induced hepatitis. Aromasin will be continued. The patient is aware that she never will be able to take Femara.   On 10/25/2022 her liver enzymes are completely normal today. Since discontinuation of Femara her drug induced hepatitis has resolved. This situation was extremely rare.   ON 12/28/2022 no issues pertinent to liver function after discontinuation of Femara months ago.   On 03/29/2023 waiting to review her liver function. All of the abnormalities resolved after stopping letrozole.   6/27/2023: Liver enzymes remain normal.   No issues pertinent to this. She remains is observation.      5. Septic Arthritis  The patient has developed an episode of septic arthritis in many joints including left shoulder and left ankle. She required antibiotic therapy as per recently. Infectious Disease was involved in this. In fact I discussed the case with them on the telephone. I suggested choosing the PICC line to be placed on the right arm in order to be able to deliver antibiotic therapy according to the proper indication. She completed the PICC line and it was removed last week with no complications or problems. The right upper extremity is completely normal. It is difficult for me to know why she developed the septic arthritis. She is in contact with horses all the time. The pathogen that she had in the joint is very uncommon in my opinion and the patient had no obvious source including no sinuses, no dental source, no obvious cardiac source, no gastrointestinal or genitourinary source and no skin source. It raises the question if this pathogen could evade to colonic source and I think I feel the obligation for this patient to have at least a CT scan of the abdomen and pelvis and eventually in several months from now consider a colonoscopy. Another consideration could be a  Prabhu in some way. At least the patient’s infection seems to be under control. Her joints are still sore today including left shoulder, left ankle. Local inflammatory signs are very much gone. The only area of inflammation that she has in the 1st MP joint on the left hand probably represents osteoarthritis, no more than that. She will remain in observation from this point of view.  On 09/14/2022 the patient has no symptoms that would suggest any further spreading or new development of septic arthritis. My fear was related to the possible seeding of her hip replacement areas by bacteria during the bacteremia that she had not too long ago. It seems that that is not the case. Radiologically speaking I do not see any local inflammation or reaction in any of these anatomical sites. There is perfect symmetry in the hip areas in regard to all her hardware material. Her sedimentation rate is BACK TO normal. Her white count is normal and this process will be watched in absence of any other intervention.  On 10/25/2022 no new episodes of septic arthritis. I measured her sedimentation rate during the previous visit that was down to 9, previously was in the 8-9 category. This means resolution of an inflammatory process altogether.   On 12/28/2022 at this time she is experiencing her typical symptom of osteoarthritis in her hands and hips but no issues pertinent to septic arthritis whatsoever. Deformities in the hands are ongoing due to osteoarthritis with no other issues or problems. No lymphedema in upper extremities.   On 03/29/2023 no significant sequela from her multiple foci and septic arthritis. What she has now is just typical osteoarthritis symptomatology and she uses Tylenol for this and occasional ibuprofen.   6/27/2023: Patient continues to report intermittent joint discomfort. Continues to manage pain with tylenol and occasional ibuprofen.   No issues pertinent to this.        6. Atrial Fibrillation  The patient  developed atrial fibrillation with rapid ventricular response during the hospitalization with septic arthritis. Echocardiogram documented very dilated left atrium. She is now receiving Eliquis, metoprolol, and digoxin. Her ventricular response is controlled today but she remains in AFib. She has requested a followup with Dr. Odell and I went ahead and made her an appointment. 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 If she had a fall and she is taking anticoagulants the only thing that we are going to have is just trouble. She has sold one of the horses. She will sell the other horse very soon and she will remain on land for the time being. Her  is back in town and he is helping her with consecution of groceries and things of this nature under the present circumstances. I advised her to minimize any trauma.  On 09/14/2022 the patient will be seen by Electrophysiology tomorrow in regard to consideration of cardioversion for her AFib. She has discussed this with Dr. Odell and I have reviewed this data. That is perfectly fine from my point of view. I find no form of contraindication from my side of the story if this is the methodology that is chosen to try to revert her to normal sinus rhythm.  On 10/25/2022 the patient is in normal sinus rhythm today. She remains on anticoagulants. She has cardiology appointment tomorrow. I asked her to buy a pulse oximeter and I taught her how to interpret the little wave curve of the pulse oximeter in regard to her heart rate. Typically patients in A-fib have very irregular pulse chaotic and the little wave curves will be continuously changing and besides the pulse rate will be continuously changing depending on the speed of the process. That is very simple to interpret. If se develops that problem I advised her to call her cardiologist.   On 12/28/2022 today she is in normal sinus rhythm  by cardiac auscultation. Echocardiogram to be done by Danni Odell MD, in the next few days and further recommendations at that point. We strongly advised the patient about the continuation of Eliquis.   On 03/29/2023 clinically her heart obeys to normal sinus rhythm.   6/27/2023: Patient continues to be followed by Dr. Odell and has a stress test next week with Dr. Goodman. Patient remains on Eliquis with good tolerance.   On 09/27/2023 the patient has been in A-fib since the time that she was seen by, Danni Odell MD, at the time that she had cardiac stress testing. Her A-fib is under control today with medications in regard to rate but she raised the question when, Manjeet Gustafson MD, is going to see her. I do not know that question but I will go ahead and request an appointment and send him a note.       7. Grade 3 lymphedema in the left upper extremity    I went and made an urgent referral to the [facility]ymp[facility]ema [facility] today to see if further bandage and drainage of this and massage would be helpful to her in the long run to control the problem. I still advised her to be extremely careful in regard to any injury or lesions in the skin of the left upper extremity to minimize any potentiality of cellulitis. In the long run if she has any episodes she will need to be back on antibiotics right away.   On 09/14/2022 the patient's lymphedema in the left upper extremity continues. She already has an appointment to be seen by the lymphedema clinic, she will require a new sleeve and massage and interventional therapy for this. She is aware that she needs to avoid any trauma in the left upper extremity to minimize any cellulitis. I strongly believe as posted before that septic arthritis is part of her lymphedema issues.   On 10/25/2022 her lymphedema in the left upper extremity is very much resolved with the sleeve and all of the manipulation that she has had in the lymphedema clinic. I advised her to be very prudent in regard doing  any nicking in the skin and damaging the skin pertinent to the left upper extremity to minimize cellulitis and potentiality for further problems.   On 12/28/2022 no lymphedema in either extremity. No need for sleeve use at this time.   On 03/29/2023 the patient has no leftover lymphedema in either extremity.  6/27/2023: Stable.   Lymphedema in the left upper extremity on 09/27/2023 is grade 1 and has not required any sleeve usage or elastic bandage at this time.         8. Hypertension  In regard to hypertension management I have controlled this before. Now she is taking quite amount of metoprolol. I will give up the management of this and now that she will see Dr. Odell, they will provide the care of this issue. I would not be surprised if the patient in the long run needs to be receiving another blood pressure medication given the fact that her blood pressure is still marginally elevated today. Taking digoxin, I do not think Lasix or hydrochlorothiazide will be a good choice because of the potential for hypokalemia unless the potassium is replaced and monitored very close. This will probably minimize the potentiality for digoxin toxicity.  On 09/14/2022 her blood pressure is controlled with the metoprolol that is provided by the cardiology team. She was advised again to avoid antiinflammatory medication for pain control.   On 10/25/2022 her blood pressure is under good control and I advised her to continue taking her blood pressure medication and once more I advised against the use of any antiinflammatory medication by oral route.   On 12/28/2022 blood pressure under good control, medications will remain the same.  On 03/29/2023 her blood pressure remains under excellent control.   6/27/2023: Continues to be followed by Cardiology.   On 09/27/2023 her blood pressure is under good control.      9. Insomnia  The problem is what to provide her for this problem at this time. There are cardiac issues. I do believe that  this patient will be better off at least for the next 6 weeks taking a benzodiazapine or something of this nature more than any other medication. I do not feel compelled to give her gabapentin that actually has not worked for her in the past. Therefore I went ahead and prescribed for her medication in this regard today to take it at bedtime to see if this allows her to sleep properly.   On 10/25/2022 the patient persists having insomnia not sleeping more than 3 hours taking Ambien. I went ahead and discontinued this medicine. I gave her Seroquel 25 mg tablets to take 1 orally nightly. I asked her to call my nurse Priya Gonzalez next week and let us know how she is doing in that arena.   6/27/2023: Patient is no longer on Seroquel. Insomnia is mildly improved.     On 09/27/2023 the patient is no longer requiring any insomnia medication.      Plan:  Plan of care has been stated in each one of the points discussed above. Most importantly she needs to be seen by, Manjeet Gustafson MD, in regard what to do in regard to her A-fib. Very likely she will require cardioversion again. A note will be sent to him.    Return to see me back in 3 weeks with CBC, CMP and CA 15-3.     The patient will remain on her Aromasin 25 mg a day.    I find no need for radiological assessment on her at this time.     I discussed all of these facts with the patient and her brother who came all of the way from [place] to this visit today.             Patient remains on high risk medication and requires close monitoring for toxicity.

## 2023-10-03 ENCOUNTER — OFFICE VISIT (OUTPATIENT)
Dept: CARDIOLOGY | Facility: CLINIC | Age: 63
End: 2023-10-03
Payer: MEDICARE

## 2023-10-03 VITALS
HEART RATE: 75 BPM | BODY MASS INDEX: 28.99 KG/M2 | SYSTOLIC BLOOD PRESSURE: 126 MMHG | WEIGHT: 174 LBS | HEIGHT: 65 IN | DIASTOLIC BLOOD PRESSURE: 74 MMHG

## 2023-10-03 DIAGNOSIS — C50.812 MALIGNANT NEOPLASM OF OVERLAPPING SITES OF LEFT BREAST IN FEMALE, ESTROGEN RECEPTOR POSITIVE: ICD-10-CM

## 2023-10-03 DIAGNOSIS — M00.9 SEPTIC ARTHRITIS, DUE TO UNSPECIFIED ORGANISM, SEPTIC ARTHRITIS OF UNSPECIFIED LOCATION: ICD-10-CM

## 2023-10-03 DIAGNOSIS — R79.89 ELEVATED LFTS: ICD-10-CM

## 2023-10-03 DIAGNOSIS — I10 PRIMARY HYPERTENSION: ICD-10-CM

## 2023-10-03 DIAGNOSIS — Z17.0 MALIGNANT NEOPLASM OF OVERLAPPING SITES OF LEFT BREAST IN FEMALE, ESTROGEN RECEPTOR POSITIVE: ICD-10-CM

## 2023-10-03 DIAGNOSIS — I48.19 PERSISTENT ATRIAL FIBRILLATION: ICD-10-CM

## 2023-10-03 DIAGNOSIS — I27.20 PULMONARY HYPERTENSION: ICD-10-CM

## 2023-10-03 DIAGNOSIS — I51.89 CARDIAC MASS: ICD-10-CM

## 2023-10-03 DIAGNOSIS — R94.31 ABNORMAL EKG: Primary | ICD-10-CM

## 2023-10-03 NOTE — PROGRESS NOTES
Date of Office Visit: 10/03/2023  Encounter Provider: ISAIAS De Dios  Place of Service: [facility] CARDIOLOGY  Patient Name: Marylu Georges  :1960    Chief complaint  Persistent atrial fibrillation    History of Present Illness  Patient is a 63-year-old female patient of Dr. Odell.  Past medical history includes  hypertension, prior stroke, left-sided breast cancer.  She had a large mass that was neglected for a period of time and became quite gigantic and ulcerated.  By 2019 she was diagnosed with breast cancer.  She had significant improvement with AC therapy in 2019 (total dose 243 mg/m² of Adriamycin) and this was followed by Taxol therapy which is completed on 2019. She subsequently underwent bilateral mastectomy 2019 with axillary node dissection.  She also underwent radiation therapy completed on 2020 and has been on adjuvant Femara since 2019.  She was able to achieve remission with this.  She then presented on 2021 showing some activity on a PET scan in the left side chest wall.  It was appearing to abut the lower aspect of her heart.  Kisquali was initiated.  She was started on magnesium with concerns of QTC prolongation that can be associated with this agent.  On 2021 tumor marker CA 15-3 had dropped resolution of the epicardial lymphadenopathy was noted by CT imaging.  Liver function tests were elevated and Kisquali  and then Femara were discontinued being seen by GI.  She has been treated with Aromasin.     In 2019 she had an echocardiogram in the setting of syncope echocardiogram that showed an ejection fraction of 60% with moderately dilated left atrium and negative saline injection.  I do not see any strain measurements.  Stress perfusion study was negative for ischemia.  Monitor showed few episodes of atrial tachycardia that were quite brief.  There is no residual malignancy,  She then received radiation therapy to bilateral chest  10/19-1/20.  With findings of mass near the pericardium follow-up echocardiogram was performed on 6/2021 that showed an ejection fraction 57% with GLS -19.9% with normal wall thickness trivial valve regurgitation no pericardial mass was appreciated.  And 6/2022 she was admitted with infectious arthritis discitis and myositis.  She was treated with antibiotics.  While in hospital she developed new onset atrial fibrillation with rapid rates.  Echo showed an ejection fraction of 55% with indeterminate diastolic function small patent foramen ovale with mild pulmonary hypertension with an RV systolic pressure 41 mmHg.  She was placed on Eliquis and metoprolol.  By 9/2022 she underwent synchronized cardioversion to sinus rhythm by Dr Gustafson.  She was seen in follow-up by EP service and while [id number] score is 1 with a history of carcinoma it was felt that her stroke risk was higher and Eliquis was continued.  Echo on 1/2023 showed an ejection fraction 61%, GLS -21.1% LV was mildly dilated with grade 1 diastolic dysfunction, atrial septum was redundant the injection was not performed there was aortic valve thickening without stenosis there is mild valve regurgitation with an RVSP of 39 mmHg.  Overall stable with slight increase in pulmonary pressures.    Interval history  Patient presents today for routine follow-up.  I will visit with her for the first time today and have reviewed her medical record.  Since last visit she has had persistent atrial fibrillation and associated palpitations and shortness of breath.  There is a message in the chart from EP to have her scheduled in a first available appointment but I do not see where this ever occurred.  She denies edema, dizziness, chest pain or chest pressure, fatigue, syncope or presyncope.  She is active working at [employer] and denies exertional symptoms.  She is tolerating Eliquis well with no falls or abnormal bleeding.  She denies any missed doses of  Eliquis.    Past Medical History:   Diagnosis Date    Anemia in neoplastic disease     Arthritis     Arthritis of back     Arthritis of neck     Breast cancer     Left    CVA (cerebral vascular accident)     Fracture, fibula     Fracture, finger     Frozen shoulder     Hip arthrosis 01/17    Hip pain     RIGHT HIP... CYST    History of fracture of leg 1987    History of radiation therapy     LAST TREATMENT      Hypertension     Knee swelling     Limited joint range of motion (ROM)     RIGHT HIP    Low back strain     Periarthritis of shoulder     Rotator cuff syndrome 6/22    Skin sore     OPEN SORE LEFT BREAST    Syncope     Vertigo      Past Surgical History:   Procedure Laterality Date    AXILLARY LYMPH NODE BIOPSY/EXCISION Right     LYMPH NODE UNDER RIGHT ARM-MALIGNANT (DOUBLE MASTECTOMY)    BREAST BIOPSY Left     MALIGNANT    FRACTURE SURGERY  1987    Leg    HARDWARE REMOVAL      INCISION AND DRAINAGE LEG Left 06/30/2022    Procedure: INCISION AND DRAINAGE left ankle;  Surgeon: Kenneth Tran MD;  Location: [facility];  Service: Orthopedics;  Laterality: Left;    JOINT REPLACEMENT  mar 2020,july 2021    hips    MASTECTOMY W/ SENTINEL NODE BIOPSY Bilateral 09/16/2019    Procedure: BILATERLA MODIFIED RADICAL MASTECTOMY WITH BILATERAL SENTINEL LYMPH NODE BIOPSY;  Surgeon: Joby Barron Jr., MD;  Location: [facility];  Service: General    TOTAL HIP ARTHROPLASTY Right 03/02/2020    Procedure: RIGHT TOTAL HIP ARTHROPLASTY NATALY NAVIGATION;  Surgeon: Luis M Leonard MD;  Location: [facility];  Service: Orthopedics;  Laterality: Right;    TOTAL HIP ARTHROPLASTY Left 07/20/2021    Procedure: Posterior LEFT TOTAL HIP ARTHROPLASTY NATALY NAVIGATION;  Surgeon: Luis M Leonard MD;  Location: [facility];  Service: Orthopedics;  Laterality: Left;    VENOUS ACCESS DEVICE (PORT) INSERTION Right 02/01/2019    Procedure: INSERTION VENOUS ACCESS DEVICE;  Surgeon: Joby Barron Jr., MD;  Location:   [facility];  Service: General    VENOUS ACCESS DEVICE (PORT) REMOVAL N/A 10/30/2019    Procedure: Mediport Removal;  Surgeon: Joby Barron Jr., MD;  Location: [facility] MAIN OR;  Service: General     Outpatient Medications Prior to Visit   Medication Sig Dispense Refill    acetaminophen (TYLENOL) 325 MG tablet Take 2 tablets by mouth Every 6 (Six) Hours As Needed.      apixaban (ELIQUIS) 5 MG tablet tablet Take 1 tablet by mouth Every 12 (Twelve) Hours. Indications: Atrial Fibrillation 60 tablet 5    Cholecalciferol 250 MCG (72302 UT) tablet Take 1 tablet by mouth. As directed      metoprolol succinate XL (TOPROL-XL) 50 MG 24 hr tablet Take 1 tablet by mouth 2 (Two) Times a Day for 180 days. 60 tablet 5    polyethylene glycol (polyethylene glycol) 17 g packet Take 17 g by mouth 2 (Two) Times a Day. (Patient taking differently: Take 17 g by mouth 2 (Two) Times a Day As Needed.)      tiZANidine (ZANAFLEX) 4 MG tablet Take 1 tablet by mouth Every 6 (Six) Hours As Needed for Muscle Spasms.      Diclofenac Sodium (VOLTAREN) 1 % gel gel Apply 4 g topically to the appropriate area as directed 4 (Four) Times a Day As Needed. RARE PRN      exemestane (AROMASIN) 25 MG tablet TAKE ONE TABLET BY MOUTH DAILY 30 tablet 3    sodium chloride 0.65 % nasal spray   1 Spray, Nasal, Drops, Q4H, PRN Nasal Congestion, 0 Refill(s)       Facility-Administered Medications Prior to Visit   Medication Dose Route Frequency Provider Last Rate Last Admin    Chlorhexidine Gluconate Cloth 2 % pads 1 each  1 each Apply externally Take As Directed Luis M Leonard MD           Allergies as of 10/03/2023 - Reviewed 10/03/2023   Allergen Reaction Noted    Benadryl [diphenhydramine] Itching 02/18/2020    Erythromycin GI Intolerance 01/17/2019    Levaquin [levofloxacin] GI Intolerance 03/07/2019    Penicillins GI Intolerance 01/17/2019     Social History     Socioeconomic History    Marital status:      Spouse name: Cruz    Number of  "children: 0   Tobacco Use    Smoking status: Never    Smokeless tobacco: Never   Vaping Use    Vaping Use: Never used   Substance and Sexual Activity    Alcohol use: Yes     Comment: occasional    Drug use: No    Sexual activity: Not Currently     Partners: Male     Birth control/protection: Abstinence     Family History   Problem Relation Age of Onset    Lung cancer Father     Irritable bowel syndrome Father     Cancer Mother     Breast cancer Mother     Liver disease Mother     Breast cancer Sister 48    Breast cancer Maternal Grandmother     Breast cancer Paternal Grandmother     Breast cancer Maternal Uncle     Malig Hyperthermia Neg Hx      Review of Systems   Constitutional: Negative for malaise/fatigue.   Cardiovascular:  Positive for palpitations. Negative for chest pain, claudication, dyspnea on exertion, leg swelling, near-syncope, orthopnea, paroxysmal nocturnal dyspnea and syncope.   Respiratory:  Positive for shortness of breath.    Neurological:  Negative for brief paralysis, dizziness, headaches and light-headedness.   All other systems reviewed and are negative.     Objective:     Vitals:    10/03/23 0848   BP: 126/74   Pulse: 75   Weight: 78.9 kg (174 lb)   Height: 165.1 cm (65\")     Body mass index is 28.96 kg/m².    Vitals reviewed.   Constitutional:       General: Not in acute distress.     Appearance: Well-developed and not in distress. Not diaphoretic.   HENT:      Head: Normocephalic.   Pulmonary:      Effort: Pulmonary effort is normal. No respiratory distress.      Breath sounds: Normal breath sounds. No wheezing. No rhonchi. No rales.   Cardiovascular:      Normal rate. Irregularly irregular rhythm.      Murmurs: There is no murmur.   Pulses:     Radial: 2+ bilaterally.  Edema:     Peripheral edema absent.   Skin:     General: Skin is warm and dry. There is no cyanosis.      Findings: No rash.   Neurological:      Mental Status: Alert and oriented to person, place, and time.   Psychiatric:  "        Behavior: Behavior normal. Behavior is cooperative.         Thought Content: Thought content normal.         Judgment: Judgment normal.     Lab Review:     Lab Results   Component Value Date     09/27/2023     06/27/2023    K 5.3 (H) 09/27/2023    K 4.1 06/27/2023     09/27/2023     06/27/2023    CO2 22.8 09/27/2023    CO2 24.8 06/27/2023    BUN 15 09/27/2023    BUN 14 06/27/2023    CREATININE 0.92 09/27/2023    CREATININE 0.92 06/27/2023    EGFRIFNONA 63 01/25/2022    EGFRIFNONA 61 12/29/2021    GLUCOSE 93 09/27/2023    GLUCOSE 111 06/27/2023    CALCIUM 9.6 09/27/2023    CALCIUM 10.1 06/27/2023    ALBUMIN 4.3 09/27/2023    ALBUMIN 4.8 06/27/2023    BILITOT 0.5 09/27/2023    BILITOT 0.5 06/27/2023    AST 22 09/27/2023    AST 22 06/27/2023    ALT 13 09/27/2023    ALT 12 06/27/2023     Lab Results   Component Value Date    WBC 4.60 09/27/2023    WBC 4.36 06/27/2023    HGB 14.1 09/27/2023    HGB 13.6 06/27/2023    HCT 44.4 09/27/2023    HCT 43.1 06/27/2023    MCV 96.1 09/27/2023    MCV 96.9 06/27/2023     09/27/2023     06/27/2023     Lab Results   Component Value Date    PROBNP 2,048.0 (H) 06/27/2022    PROBNP 16.2 07/30/2019     Lab Results   Component Value Date    TROPONINT <0.010 06/27/2022     Lab Results   Component Value Date    TSH 1.820 06/27/2022    TSH 1.370 07/30/2019             ECG 12 Lead    Date/Time: 10/3/2023 10:41 AM  Performed by: Pamela Shah APRN  Authorized by: Pamela Shah APRN   Comparison: compared with previous ECG   Similar to previous ECG  Rhythm: atrial fibrillation  Rate: normal  BPM: 75  QRS axis: normal  Comments: Similar to prior.  Atrial fibrillation has returned post cardioversion.      Assessment:       Diagnosis Plan   1. Abnormal EKG        2. Persistent atrial fibrillation        3. Malignant neoplasm of overlapping sites of left breast in female, estrogen receptor positive        4. Cardiac mass        5. Primary  hypertension        6. Pulmonary hypertension        7. Elevated LFTs        8. Septic arthritis, due to unspecified organism, septic arthritis of unspecified location          Plan:       1.  Abnormal EKG with new ST-T wave changes.  She is fairly sedentary and has multiple risk factors for coronary disease.  These changes are fairly significant.  Most recent echo in January 2023 did not show any LV dysfunction or worsening valve regurgitation.  Lexiscan Cardiolite negative for ischemia.    2.  Paroxysmal atrial fibrillation, status post electrical cardioversion to sinus rhythm.  Back in atrial fibrillation status post stress test.  She is tolerating Eliquis well and is rate controlled on metoprolol.  We will get her back in with the EP for possible repeat cardioversion versus ablation.    3.  Metastatic breast cancer.  Felt to be stable and now on Aromasin  4.  Pericardial mass.  Resolved on chemotherapy.  And being followed by serial imaging by Dr. Dixon.  5.  Hypertension, controlled  6.  Pulmonary hypertension.  Slightly elevated with RVSP of 39 mmHg in 1/2023 but improved.  7.  Elevated liver function tests.  She had a biopsy and is being followed by Dr. Kirkpatrick.   8.  Arthritis, septic.  Treated with antibiotics IV.      Time Spent: I spent 30 minutes caring for Marylu on this date of service. This time includes time spent by me in the following activities: preparing for the visit, reviewing tests, performing a medically appropriate examination and/or evaluation, counseling and educating the patient/family/caregiver, ordering medications, tests, or procedures, and documenting information in the medical record.   I spent 1 minutes on the separately reported service of ECG. This time is not included in the time used to support the E/M service also reported today.        Your medication list            Accurate as of October 3, 2023  9:18 AM. If you have any questions, ask your nurse or doctor.                 CHANGE how you take these medications        Instructions Last Dose Given Next Dose Due   polyethylene glycol 17 g packet  Commonly known as: MIRALAX  What changed:   when to take this  reasons to take this      Take 17 g by mouth 2 (Two) Times a Day.              CONTINUE taking these medications        Instructions Last Dose Given Next Dose Due   acetaminophen 325 MG tablet  Commonly known as: TYLENOL      Take 2 tablets by mouth Every 6 (Six) Hours As Needed.       apixaban 5 MG tablet tablet  Commonly known as: ELIQUIS      Take 1 tablet by mouth Every 12 (Twelve) Hours. Indications: Atrial Fibrillation       Cholecalciferol 250 MCG (86176 UT) tablet      Take 1 tablet by mouth. As directed       Diclofenac Sodium 1 % gel gel  Commonly known as: VOLTAREN      Apply 4 g topically to the appropriate area as directed 4 (Four) Times a Day As Needed. RARE PRN       exemestane 25 MG tablet  Commonly known as: AROMASIN      TAKE ONE TABLET BY MOUTH DAILY       metoprolol succinate XL 50 MG 24 hr tablet  Commonly known as: TOPROL-XL      Take 1 tablet by mouth 2 (Two) Times a Day for 180 days.       sodium chloride 0.65 % nasal spray      1 Spray, Nasal, Drops, Q4H, PRN Nasal Congestion, 0 Refill(s)       tiZANidine 4 MG tablet  Commonly known as: ZANAFLEX      Take 1 tablet by mouth Every 6 (Six) Hours As Needed for Muscle Spasms.                Patient is no longer taking -.  I corrected the med list to reflect this.  I did not stop these medications.      Dictated utilizing Dragon dictation

## 2023-10-04 RX ORDER — METOPROLOL SUCCINATE 50 MG/1
TABLET, EXTENDED RELEASE ORAL
Qty: 60 TABLET | Refills: 5 | Status: SHIPPED | OUTPATIENT
Start: 2023-10-04

## 2023-10-06 ENCOUNTER — OFFICE VISIT (OUTPATIENT)
Age: 63
End: 2023-10-06
Payer: MEDICARE

## 2023-10-06 ENCOUNTER — TELEPHONE (OUTPATIENT)
Dept: CARDIOLOGY | Facility: CLINIC | Age: 63
End: 2023-10-06

## 2023-10-06 VITALS
BODY MASS INDEX: 28.99 KG/M2 | DIASTOLIC BLOOD PRESSURE: 90 MMHG | WEIGHT: 174 LBS | SYSTOLIC BLOOD PRESSURE: 138 MMHG | HEIGHT: 65 IN | HEART RATE: 102 BPM

## 2023-10-06 DIAGNOSIS — I48.19 ATRIAL FIBRILLATION, PERSISTENT: Primary | ICD-10-CM

## 2023-10-06 DIAGNOSIS — I48.91 NEW ONSET ATRIAL FIBRILLATION: ICD-10-CM

## 2023-10-06 NOTE — TELEPHONE ENCOUNTER
Caller: Marylu Georges    Relationship: Self    Best call back number: [phone]    What is the best time to reach you: ANY    Who are you requesting to speak with (clinical staff, provider,  specific staff member): CLINICAL    Do you know the name of the person who called: NO    What was the call regarding: RESCHEDULING- PATIENT IS A T WORK AND CANNOT LEAVE    Is it okay if the provider responds through MyChart:

## 2023-10-06 NOTE — PROGRESS NOTES
Date of Office Visit: 10/06/2023  Encounter Provider: ISAIAS Keller  Place of Service: [facility] CARDIOLOGY  Patient Name: Marylu Georges  :1960    Chief Complaint   Patient presents with    Atrial Fibrillation   :     HPI: Marylu Georges is a 63 y.o. female who is followed by Dr. Odell, referred to Dr. Gustafson for persistent atrial fibrillation---s/p CV (2022). She also has history of CVA, syncope, and breast cancer---currently stable on maintenence chemo.     She was hospitalized in July with infectious arthritis and went into atrial fibrillation which was new for her. She was started on metoprolol and digoxin but rate was difficult to control.      She saw Dr. Gustafson on 9/15/2022 and was in persistent atrial fibrillation. He discussed treatment options with her and since it was her first episode, they elected to pursue CV for rhythm control. [id number] was 1, but with her cancer she was at increased risk of stroke, AC was continued.  Plan was AAD if she had recurrence.      I saw her in 2022. She was maintaining NSR.     She had stress test in July and during exam she went back into AF.     Message was sent for appt with me but this was never scheduled.                   She presents today for follow up appt.     Since being back in AF she notes dyspnea with exertion and some palpitations.     She really feels like the AF limits her ability to exercise and work her job.     She works with horses and very physical.     EKG shows AF with rate of 100bpm.     Patient has history of cancer. Not currently undergoing chemo.     She has never been on any AAD.     Apixaban for AC. No missed doses.           Past Medical History:   Diagnosis Date    Anemia in neoplastic disease     Arthritis     Arthritis of back     Arthritis of neck     Breast cancer     Left    CVA (cerebral vascular accident)     Fracture, fibula     Fracture, finger     Frozen shoulder      Hip arthrosis 01/17    Hip pain     RIGHT HIP... CYST    History of fracture of leg 1987    History of radiation therapy     LAST TREATMENT      Hypertension     Knee swelling     Limited joint range of motion (ROM)     RIGHT HIP    Low back strain     Periarthritis of shoulder     Rotator cuff syndrome 6/22    Skin sore     OPEN SORE LEFT BREAST    Syncope     Vertigo        Past Surgical History:   Procedure Laterality Date    AXILLARY LYMPH NODE BIOPSY/EXCISION Right     LYMPH NODE UNDER RIGHT ARM-MALIGNANT (DOUBLE MASTECTOMY)    BREAST BIOPSY Left     MALIGNANT    FRACTURE SURGERY  1987    Leg    HARDWARE REMOVAL      INCISION AND DRAINAGE LEG Left 06/30/2022    Procedure: INCISION AND DRAINAGE left ankle;  Surgeon: Kenneth Tran MD;  Location: [facility];  Service: Orthopedics;  Laterality: Left;    JOINT REPLACEMENT  mar 2020,july 2021    hips    MASTECTOMY W/ SENTINEL NODE BIOPSY Bilateral 09/16/2019    Procedure: BILATERLA MODIFIED RADICAL MASTECTOMY WITH BILATERAL SENTINEL LYMPH NODE BIOPSY;  Surgeon: Joby Barron Jr., MD;  Location: [facility] OR;  Service: General    TOTAL HIP ARTHROPLASTY Right 03/02/2020    Procedure: RIGHT TOTAL HIP ARTHROPLASTY NATALY NAVIGATION;  Surgeon: Luis M Leonard MD;  Location: [facility] OR;  Service: Orthopedics;  Laterality: Right;    TOTAL HIP ARTHROPLASTY Left 07/20/2021    Procedure: Posterior LEFT TOTAL HIP ARTHROPLASTY NATALY NAVIGATION;  Surgeon: Luis M Leonard MD;  Location: [facility] OR;  Service: Orthopedics;  Laterality: Left;    VENOUS ACCESS DEVICE (PORT) INSERTION Right 02/01/2019    Procedure: INSERTION VENOUS ACCESS DEVICE;  Surgeon: Joby Barron Jr., MD;  Location: [facility], [place], operated by [facility];  Service: General    VENOUS ACCESS DEVICE (PORT) REMOVAL N/A 10/30/2019    Procedure: Mediport Removal;  Surgeon: Joby Barron Jr., MD;  Location: [facility] OR;  Service: General       Social History     Socioeconomic History    Marital status:       Spouse name: Cruz    Number of children: 0   Tobacco Use    Smoking status: Never    Smokeless tobacco: Never   Vaping Use    Vaping Use: Never used   Substance and Sexual Activity    Alcohol use: Yes     Comment: occasional    Drug use: No    Sexual activity: Not Currently     Partners: Male     Birth control/protection: Abstinence       Family History   Problem Relation Age of Onset    Lung cancer Father     Irritable bowel syndrome Father     Cancer Mother     Breast cancer Mother     Liver disease Mother     Breast cancer Sister 48    Breast cancer Maternal Grandmother     Breast cancer Paternal Grandmother     Breast cancer Maternal Uncle     Malig Hyperthermia Neg Hx        Review of Systems   Constitutional: Positive for malaise/fatigue. Negative for chills and fever.   Cardiovascular:  Positive for palpitations. Negative for chest pain, dyspnea on exertion, leg swelling, near-syncope, orthopnea, paroxysmal nocturnal dyspnea and syncope.   Respiratory:  Positive for shortness of breath. Negative for cough.    Hematologic/Lymphatic: Negative.    Musculoskeletal:  Negative for joint pain, joint swelling and myalgias.   Gastrointestinal:  Negative for abdominal pain, diarrhea, melena, nausea and vomiting.   Genitourinary:  Negative for frequency and hematuria.   Neurological:  Negative for light-headedness, numbness, paresthesias and seizures.   Allergic/Immunologic: Negative.    All other systems reviewed and are negative.    Allergies   Allergen Reactions    Benadryl [Diphenhydramine] Itching     INCREASES BLOOD PRESSURE    Erythromycin GI Intolerance    Levaquin [Levofloxacin] GI Intolerance    Penicillins GI Intolerance         Current Outpatient Medications:     acetaminophen (TYLENOL) 325 MG tablet, Take 2 tablets by mouth Every 6 (Six) Hours As Needed., Disp: , Rfl:     apixaban (ELIQUIS) 5 MG tablet tablet, Take 1 tablet by mouth Every 12 (Twelve) Hours. Indications: Atrial Fibrillation,  "Disp: 60 tablet, Rfl: 5    Cholecalciferol 250 MCG (21870 UT) tablet, Take 1 tablet by mouth. As directed, Disp: , Rfl:     exemestane (AROMASIN) 25 MG tablet, TAKE ONE TABLET BY MOUTH DAILY, Disp: 30 tablet, Rfl: 3    metoprolol succinate XL (TOPROL-XL) 50 MG 24 hr tablet, TAKE ONE TABLET BY MOUTH TWICE A DAY, Disp: 60 tablet, Rfl: 5    polyethylene glycol (polyethylene glycol) 17 g packet, Take 17 g by mouth 2 (Two) Times a Day. (Patient taking differently: Take 17 g by mouth 2 (Two) Times a Day As Needed.), Disp: , Rfl:     tiZANidine (ZANAFLEX) 4 MG tablet, Take 1 tablet by mouth Every 6 (Six) Hours As Needed for Muscle Spasms., Disp: , Rfl:   No current facility-administered medications for this visit.    Facility-Administered Medications Ordered in Other Visits:     Chlorhexidine Gluconate Cloth 2 % pads 1 each, 1 each, Apply externally, Take As Directed, Luis M Leonard MD      Objective:     Vitals:    10/06/23 1351   BP: 138/90   Pulse: 102   Weight: 78.9 kg (174 lb)   Height: 165.1 cm (65\")     Body mass index is 28.96 kg/m².    PHYSICAL EXAM:    Vitals Reviewed.   General Appearance: No acute distress, well developed and well nourished.   Eyes: Conjunctiva and lids: No erythema, swelling, or discharge. Sclera non-icteric.   HENT: Atraumatic, normocephalic. External eyes, ears, and nose normal.   Respiratory: No signs of respiratory distress. Respiration rhythm and depth normal.   Clear to auscultation. No rales, crackles, rhonchi, or wheezing auscultated.   Cardiovascular:  Heart Rate and Rhythm: Normal, Heart Sounds: Normal S1 and S2. No S3 or S4 noted.  Gastrointestinal:  Abdomen soft, non-distended, non-tender.   Musculoskeletal: Normal movement of extremities  Skin: Warm and dry.   Psychiatric: Patient alert and oriented to person, place, and time. Speech and behavior appropriate. Normal mood and affect.       ECG 12 Lead    Date/Time: 10/6/2023 3:31 PM  Performed by: Amador Polanco APRN  Authorized " by: Amador Polanco APRN   Comparison: compared with previous ECG   Similar to previous ECG  Rhythm: atrial fibrillation  BPM: 101          Assessment:       Diagnosis Plan   1. Atrial fibrillation, persistent  Cardioversion External in Cardiology Department      2. New onset atrial fibrillation               Plan:   1-2. Persistent atrial fibrillation---s/p CV (9/2022)---- she maintained NSR for about a year but had recurrence in July. Since being back in AF she notes moderate symptoms and impact on QOL, due to this she strongly wishes to pursue rhythm control.     Since she is not on chemo/QT prolonging drugs anymore then I think AAD would be an option.     We discussed using AAD, admitting for dofetilide vs. Amiodarone.       After discussion since the CV lasted a year, she would prefer just to have another CV without addition  of AAD. We will scheduled this for next week.         If she has recurrent AF post CV then consider admission for AAD.     As always, it has been a pleasure to participate in your patient's care.      Sincerely,         ISAIAS Keller

## 2023-10-06 NOTE — H&P (VIEW-ONLY)
Date of Office Visit: 10/06/2023  Encounter Provider: ISAIAS Keller  Place of Service: [facility] CARDIOLOGY  Patient Name: Marylu Georges  :1960    Chief Complaint   Patient presents with    Atrial Fibrillation   :     HPI: Marylu Georges is a 63 y.o. female who is followed by Dr. Odell, referred to Dr. Gustafson for persistent atrial fibrillation---s/p CV (2022). She also has history of CVA, syncope, and breast cancer---currently stable on maintenence chemo.     She was hospitalized in July with infectious arthritis and went into atrial fibrillation which was new for her. She was started on metoprolol and digoxin but rate was difficult to control.      She saw Dr. Gustafson on 9/15/2022 and was in persistent atrial fibrillation. He discussed treatment options with her and since it was her first episode, they elected to pursue CV for rhythm control. [id number] was 1, but with her cancer she was at increased risk of stroke, AC was continued.  Plan was AAD if she had recurrence.      I saw her in 2022. She was maintaining NSR.     She had stress test in July and during exam she went back into AF.     Message was sent for appt with me but this was never scheduled.                   She presents today for follow up appt.     Since being back in AF she notes dyspnea with exertion and some palpitations.     She really feels like the AF limits her ability to exercise and work her job.     She works with horses and very physical.     EKG shows AF with rate of 100bpm.     Patient has history of cancer. Not currently undergoing chemo.     She has never been on any AAD.     Apixaban for AC. No missed doses.           Past Medical History:   Diagnosis Date    Anemia in neoplastic disease     Arthritis     Arthritis of back     Arthritis of neck     Breast cancer     Left    CVA (cerebral vascular accident)     Fracture, fibula     Fracture, finger     Frozen shoulder      Hip arthrosis 01/17    Hip pain     RIGHT HIP... CYST    History of fracture of leg 1987    History of radiation therapy     LAST TREATMENT      Hypertension     Knee swelling     Limited joint range of motion (ROM)     RIGHT HIP    Low back strain     Periarthritis of shoulder     Rotator cuff syndrome 6/22    Skin sore     OPEN SORE LEFT BREAST    Syncope     Vertigo        Past Surgical History:   Procedure Laterality Date    AXILLARY LYMPH NODE BIOPSY/EXCISION Right     LYMPH NODE UNDER RIGHT ARM-MALIGNANT (DOUBLE MASTECTOMY)    BREAST BIOPSY Left     MALIGNANT    FRACTURE SURGERY  1987    Leg    HARDWARE REMOVAL      INCISION AND DRAINAGE LEG Left 06/30/2022    Procedure: INCISION AND DRAINAGE left ankle;  Surgeon: Kenneth Tran MD;  Location: [facility];  Service: Orthopedics;  Laterality: Left;    JOINT REPLACEMENT  mar 2020,july 2021    hips    MASTECTOMY W/ SENTINEL NODE BIOPSY Bilateral 09/16/2019    Procedure: BILATERLA MODIFIED RADICAL MASTECTOMY WITH BILATERAL SENTINEL LYMPH NODE BIOPSY;  Surgeon: Joby Barron Jr., MD;  Location: [facility] OR;  Service: General    TOTAL HIP ARTHROPLASTY Right 03/02/2020    Procedure: RIGHT TOTAL HIP ARTHROPLASTY NATALY NAVIGATION;  Surgeon: Luis M Leonard MD;  Location: [facility] OR;  Service: Orthopedics;  Laterality: Right;    TOTAL HIP ARTHROPLASTY Left 07/20/2021    Procedure: Posterior LEFT TOTAL HIP ARTHROPLASTY NATALY NAVIGATION;  Surgeon: Luis M Leonard MD;  Location: [facility] OR;  Service: Orthopedics;  Laterality: Left;    VENOUS ACCESS DEVICE (PORT) INSERTION Right 02/01/2019    Procedure: INSERTION VENOUS ACCESS DEVICE;  Surgeon: Joby Barron Jr., MD;  Location: [facility], [place], operated by [facility];  Service: General    VENOUS ACCESS DEVICE (PORT) REMOVAL N/A 10/30/2019    Procedure: Mediport Removal;  Surgeon: Joby Barron Jr., MD;  Location: [facility] OR;  Service: General       Social History     Socioeconomic History    Marital status:       Spouse name: Cruz    Number of children: 0   Tobacco Use    Smoking status: Never    Smokeless tobacco: Never   Vaping Use    Vaping Use: Never used   Substance and Sexual Activity    Alcohol use: Yes     Comment: occasional    Drug use: No    Sexual activity: Not Currently     Partners: Male     Birth control/protection: Abstinence       Family History   Problem Relation Age of Onset    Lung cancer Father     Irritable bowel syndrome Father     Cancer Mother     Breast cancer Mother     Liver disease Mother     Breast cancer Sister 48    Breast cancer Maternal Grandmother     Breast cancer Paternal Grandmother     Breast cancer Maternal Uncle     Malig Hyperthermia Neg Hx        Review of Systems   Constitutional: Positive for malaise/fatigue. Negative for chills and fever.   Cardiovascular:  Positive for palpitations. Negative for chest pain, dyspnea on exertion, leg swelling, near-syncope, orthopnea, paroxysmal nocturnal dyspnea and syncope.   Respiratory:  Positive for shortness of breath. Negative for cough.    Hematologic/Lymphatic: Negative.    Musculoskeletal:  Negative for joint pain, joint swelling and myalgias.   Gastrointestinal:  Negative for abdominal pain, diarrhea, melena, nausea and vomiting.   Genitourinary:  Negative for frequency and hematuria.   Neurological:  Negative for light-headedness, numbness, paresthesias and seizures.   Allergic/Immunologic: Negative.    All other systems reviewed and are negative.    Allergies   Allergen Reactions    Benadryl [Diphenhydramine] Itching     INCREASES BLOOD PRESSURE    Erythromycin GI Intolerance    Levaquin [Levofloxacin] GI Intolerance    Penicillins GI Intolerance         Current Outpatient Medications:     acetaminophen (TYLENOL) 325 MG tablet, Take 2 tablets by mouth Every 6 (Six) Hours As Needed., Disp: , Rfl:     apixaban (ELIQUIS) 5 MG tablet tablet, Take 1 tablet by mouth Every 12 (Twelve) Hours. Indications: Atrial Fibrillation,  "Disp: 60 tablet, Rfl: 5    Cholecalciferol 250 MCG (26700 UT) tablet, Take 1 tablet by mouth. As directed, Disp: , Rfl:     exemestane (AROMASIN) 25 MG tablet, TAKE ONE TABLET BY MOUTH DAILY, Disp: 30 tablet, Rfl: 3    metoprolol succinate XL (TOPROL-XL) 50 MG 24 hr tablet, TAKE ONE TABLET BY MOUTH TWICE A DAY, Disp: 60 tablet, Rfl: 5    polyethylene glycol (polyethylene glycol) 17 g packet, Take 17 g by mouth 2 (Two) Times a Day. (Patient taking differently: Take 17 g by mouth 2 (Two) Times a Day As Needed.), Disp: , Rfl:     tiZANidine (ZANAFLEX) 4 MG tablet, Take 1 tablet by mouth Every 6 (Six) Hours As Needed for Muscle Spasms., Disp: , Rfl:   No current facility-administered medications for this visit.    Facility-Administered Medications Ordered in Other Visits:     Chlorhexidine Gluconate Cloth 2 % pads 1 each, 1 each, Apply externally, Take As Directed, Luis M Leonard MD      Objective:     Vitals:    10/06/23 1351   BP: 138/90   Pulse: 102   Weight: 78.9 kg (174 lb)   Height: 165.1 cm (65\")     Body mass index is 28.96 kg/mý.    PHYSICAL EXAM:    Vitals Reviewed.   General Appearance: No acute distress, well developed and well nourished.   Eyes: Conjunctiva and lids: No erythema, swelling, or discharge. Sclera non-icteric.   HENT: Atraumatic, normocephalic. External eyes, ears, and nose normal.   Respiratory: No signs of respiratory distress. Respiration rhythm and depth normal.   Clear to auscultation. No rales, crackles, rhonchi, or wheezing auscultated.   Cardiovascular:  Heart Rate and Rhythm: Normal, Heart Sounds: Normal S1 and S2. No S3 or S4 noted.  Gastrointestinal:  Abdomen soft, non-distended, non-tender.   Musculoskeletal: Normal movement of extremities  Skin: Warm and dry.   Psychiatric: Patient alert and oriented to person, place, and time. Speech and behavior appropriate. Normal mood and affect.       ECG 12 Lead    Date/Time: 10/6/2023 3:31 PM  Performed by: Amador Polanco APRN  Authorized " by: Amador Polanco APRN   Comparison: compared with previous ECG   Similar to previous ECG  Rhythm: atrial fibrillation  BPM: 101          Assessment:       Diagnosis Plan   1. Atrial fibrillation, persistent  Cardioversion External in Cardiology Department      2. New onset atrial fibrillation               Plan:   1-2. Persistent atrial fibrillation---s/p CV (9/2022)---- she maintained NSR for about a year but had recurrence in July. Since being back in AF she notes moderate symptoms and impact on QOL, due to this she strongly wishes to pursue rhythm control.     Since she is not on chemo/QT prolonging drugs anymore then I think AAD would be an option.     We discussed using AAD, admitting for dofetilide vs. Amiodarone.       After discussion since the CV lasted a year, she would prefer just to have another CV without addition  of AAD. We will scheduled this for next week.         If she has recurrent AF post CV then consider admission for AAD.     As always, it has been a pleasure to participate in your patient's care.      Sincerely,         ISAIAS Keller

## 2023-10-07 DIAGNOSIS — C50.812 MALIGNANT NEOPLASM OF OVERLAPPING SITES OF LEFT BREAST IN FEMALE, ESTROGEN RECEPTOR POSITIVE: ICD-10-CM

## 2023-10-07 DIAGNOSIS — Z17.0 MALIGNANT NEOPLASM OF OVERLAPPING SITES OF LEFT BREAST IN FEMALE, ESTROGEN RECEPTOR POSITIVE: ICD-10-CM

## 2023-10-09 RX ORDER — EXEMESTANE 25 MG/1
25 TABLET ORAL DAILY
Qty: 30 TABLET | Refills: 3 | Status: SHIPPED | OUTPATIENT
Start: 2023-10-09

## 2023-10-09 NOTE — TELEPHONE ENCOUNTER
Caller: Marylu Georges    Relationship: Self        What was the call regarding: CALLED IS COMPLETELY OUT OF THIS MEDICATION EXEMESTANE NEEDING SENT IN TODAY .

## 2023-10-11 ENCOUNTER — HOSPITAL ENCOUNTER (OUTPATIENT)
Dept: CARDIOLOGY | Facility: HOSPITAL | Age: 63
Discharge: HOME | End: 2023-10-11
Admitting: INTERNAL MEDICINE
Payer: MEDICARE

## 2023-10-11 VITALS
OXYGEN SATURATION: 98 % | DIASTOLIC BLOOD PRESSURE: 117 MMHG | TEMPERATURE: 97.6 F | HEART RATE: 66 BPM | WEIGHT: 174 LBS | SYSTOLIC BLOOD PRESSURE: 167 MMHG | BODY MASS INDEX: 27.97 KG/M2 | HEIGHT: 66 IN | RESPIRATION RATE: 16 BRPM

## 2023-10-11 DIAGNOSIS — I48.19 ATRIAL FIBRILLATION, PERSISTENT: ICD-10-CM

## 2023-10-11 LAB
QT INTERVAL: 364 MS
QTC INTERVAL: 458 MS

## 2023-10-11 PROCEDURE — 92960 CARDIOVERSION ELECTRIC EXT: CPT

## 2023-10-11 PROCEDURE — 25810000003 SODIUM CHLORIDE 0.9 % SOLUTION: Performed by: INTERNAL MEDICINE

## 2023-10-11 PROCEDURE — 93005 ELECTROCARDIOGRAM TRACING: CPT | Performed by: INTERNAL MEDICINE

## 2023-10-11 RX ORDER — POLYETHYLENE GLYCOL 3350 17 G/17G
17 POWDER, FOR SOLUTION ORAL 2 TIMES DAILY PRN
Start: 2023-10-11

## 2023-10-11 RX ORDER — SODIUM CHLORIDE 9 MG/ML
75 INJECTION, SOLUTION INTRAVENOUS CONTINUOUS
Status: DISCONTINUED | OUTPATIENT
Start: 2023-10-11 | End: 2023-10-12 | Stop reason: HOSPADM

## 2023-10-11 RX ORDER — METHOHEXITAL IN WATER/PF 100MG/10ML
SYRINGE (ML) INTRAVENOUS
Status: COMPLETED | OUTPATIENT
Start: 2023-10-11 | End: 2023-10-11

## 2023-10-11 RX ADMIN — SODIUM CHLORIDE 75 ML/HR: 9 INJECTION, SOLUTION INTRAVENOUS at 10:24

## 2023-10-11 RX ADMIN — Medication 40 MG: at 11:21

## 2023-10-27 ENCOUNTER — SPECIALTY PHARMACY (OUTPATIENT)
Dept: PHARMACY | Facility: HOSPITAL | Age: 63
End: 2023-10-27
Payer: MEDICARE

## 2023-12-05 ENCOUNTER — SPECIALTY PHARMACY (OUTPATIENT)
Dept: PHARMACY | Facility: HOSPITAL | Age: 63
End: 2023-12-05
Payer: MEDICARE

## 2023-12-28 ENCOUNTER — LAB (OUTPATIENT)
Dept: LAB | Facility: HOSPITAL | Age: 63
End: 2023-12-28
Payer: MEDICARE

## 2023-12-28 ENCOUNTER — OFFICE VISIT (OUTPATIENT)
Dept: ONCOLOGY | Facility: CLINIC | Age: 63
End: 2023-12-28
Payer: MEDICARE

## 2023-12-28 ENCOUNTER — OFFICE VISIT (OUTPATIENT)
Dept: CARDIOLOGY | Facility: CLINIC | Age: 63
End: 2023-12-28
Payer: MEDICARE

## 2023-12-28 ENCOUNTER — TELEPHONE (OUTPATIENT)
Dept: ONCOLOGY | Facility: CLINIC | Age: 63
End: 2023-12-28
Payer: MEDICARE

## 2023-12-28 VITALS
HEART RATE: 86 BPM | WEIGHT: 176 LBS | RESPIRATION RATE: 18 BRPM | BODY MASS INDEX: 28.28 KG/M2 | TEMPERATURE: 97.8 F | OXYGEN SATURATION: 100 % | HEIGHT: 66 IN

## 2023-12-28 VITALS
HEART RATE: 85 BPM | DIASTOLIC BLOOD PRESSURE: 118 MMHG | WEIGHT: 176.6 LBS | BODY MASS INDEX: 28.38 KG/M2 | HEIGHT: 66 IN | SYSTOLIC BLOOD PRESSURE: 180 MMHG

## 2023-12-28 DIAGNOSIS — C50.812 MALIGNANT NEOPLASM OF OVERLAPPING SITES OF LEFT BREAST IN FEMALE, ESTROGEN RECEPTOR POSITIVE: Primary | ICD-10-CM

## 2023-12-28 DIAGNOSIS — T50.905A DRUG-INDUCED HEPATITIS: ICD-10-CM

## 2023-12-28 DIAGNOSIS — I89.0 LYMPHEDEMA OF UPPER EXTREMITY, BILATERAL: ICD-10-CM

## 2023-12-28 DIAGNOSIS — K71.6 DRUG-INDUCED HEPATITIS: ICD-10-CM

## 2023-12-28 DIAGNOSIS — I10 PRIMARY HYPERTENSION: ICD-10-CM

## 2023-12-28 DIAGNOSIS — F51.01 PRIMARY INSOMNIA: ICD-10-CM

## 2023-12-28 DIAGNOSIS — Z17.0 MALIGNANT NEOPLASM OF OVERLAPPING SITES OF LEFT BREAST IN FEMALE, ESTROGEN RECEPTOR POSITIVE: Primary | ICD-10-CM

## 2023-12-28 DIAGNOSIS — R07.2 PRECORDIAL PAIN: ICD-10-CM

## 2023-12-28 DIAGNOSIS — I51.89 CARDIAC MASS: Primary | ICD-10-CM

## 2023-12-28 DIAGNOSIS — C50.812 MALIGNANT NEOPLASM OF OVERLAPPING SITES OF LEFT BREAST IN FEMALE, ESTROGEN RECEPTOR POSITIVE: ICD-10-CM

## 2023-12-28 DIAGNOSIS — Z17.0 MALIGNANT NEOPLASM OF OVERLAPPING SITES OF LEFT BREAST IN FEMALE, ESTROGEN RECEPTOR POSITIVE: ICD-10-CM

## 2023-12-28 PROBLEM — R74.01 TRANSAMINITIS: Status: RESOLVED | Noted: 2022-06-27 | Resolved: 2023-12-28

## 2023-12-28 PROBLEM — R55 SYNCOPE: Status: RESOLVED | Noted: 2019-07-30 | Resolved: 2023-12-28

## 2023-12-28 PROBLEM — M19.90 INFLAMMATORY ARTHRITIS: Status: RESOLVED | Noted: 2022-06-28 | Resolved: 2023-12-28

## 2023-12-28 LAB
ALBUMIN SERPL-MCNC: 4.7 G/DL (ref 3.5–5.2)
ALBUMIN/GLOB SERPL: 1.7 G/DL
ALP SERPL-CCNC: 107 U/L (ref 39–117)
ALT SERPL W P-5'-P-CCNC: 10 U/L (ref 1–33)
ANION GAP SERPL CALCULATED.3IONS-SCNC: 10 MMOL/L (ref 5–15)
AST SERPL-CCNC: 24 U/L (ref 1–32)
BASOPHILS # BLD AUTO: 0.05 10*3/MM3 (ref 0–0.2)
BASOPHILS NFR BLD AUTO: 1 % (ref 0–1.5)
BILIRUB SERPL-MCNC: 0.5 MG/DL (ref 0–1.2)
BUN SERPL-MCNC: 13 MG/DL (ref 8–23)
BUN/CREAT SERPL: 13.8 (ref 7–25)
CALCIUM SPEC-SCNC: 9.9 MG/DL (ref 8.6–10.5)
CANCER AG15-3 SERPL-ACNC: 17.5 U/ML
CHLORIDE SERPL-SCNC: 103 MMOL/L (ref 98–107)
CO2 SERPL-SCNC: 27 MMOL/L (ref 22–29)
CREAT SERPL-MCNC: 0.94 MG/DL (ref 0.57–1)
DEPRECATED RDW RBC AUTO: 49.1 FL (ref 37–54)
EGFRCR SERPLBLD CKD-EPI 2021: 67.5 ML/MIN/1.73
EOSINOPHIL # BLD AUTO: 0.07 10*3/MM3 (ref 0–0.4)
EOSINOPHIL NFR BLD AUTO: 1.4 % (ref 0.3–6.2)
ERYTHROCYTE [DISTWIDTH] IN BLOOD BY AUTOMATED COUNT: 14.3 % (ref 12.3–15.4)
GLOBULIN UR ELPH-MCNC: 2.8 GM/DL
GLUCOSE SERPL-MCNC: 106 MG/DL (ref 65–99)
HCT VFR BLD AUTO: 44.7 % (ref 34–46.6)
HGB BLD-MCNC: 14.5 G/DL (ref 12–15.9)
IMM GRANULOCYTES # BLD AUTO: 0.02 10*3/MM3 (ref 0–0.05)
IMM GRANULOCYTES NFR BLD AUTO: 0.4 % (ref 0–0.5)
LYMPHOCYTES # BLD AUTO: 1.1 10*3/MM3 (ref 0.7–3.1)
LYMPHOCYTES NFR BLD AUTO: 22.7 % (ref 19.6–45.3)
MCH RBC QN AUTO: 30.2 PG (ref 26.6–33)
MCHC RBC AUTO-ENTMCNC: 32.4 G/DL (ref 31.5–35.7)
MCV RBC AUTO: 93.1 FL (ref 79–97)
MONOCYTES # BLD AUTO: 0.31 10*3/MM3 (ref 0.1–0.9)
MONOCYTES NFR BLD AUTO: 6.4 % (ref 5–12)
NEUTROPHILS NFR BLD AUTO: 3.3 10*3/MM3 (ref 1.7–7)
NEUTROPHILS NFR BLD AUTO: 68.1 % (ref 42.7–76)
NRBC BLD AUTO-RTO: 0 /100 WBC (ref 0–0.2)
PLATELET # BLD AUTO: 231 10*3/MM3 (ref 140–450)
PMV BLD AUTO: 8.2 FL (ref 6–12)
POTASSIUM SERPL-SCNC: 4.6 MMOL/L (ref 3.5–5.2)
PROT SERPL-MCNC: 7.5 G/DL (ref 6–8.5)
RBC # BLD AUTO: 4.8 10*6/MM3 (ref 3.77–5.28)
SODIUM SERPL-SCNC: 140 MMOL/L (ref 136–145)
WBC NRBC COR # BLD AUTO: 4.85 10*3/MM3 (ref 3.4–10.8)

## 2023-12-28 PROCEDURE — 85025 COMPLETE CBC W/AUTO DIFF WBC: CPT

## 2023-12-28 PROCEDURE — 80053 COMPREHEN METABOLIC PANEL: CPT

## 2023-12-28 PROCEDURE — 3077F SYST BP >= 140 MM HG: CPT | Performed by: INTERNAL MEDICINE

## 2023-12-28 PROCEDURE — 1160F RVW MEDS BY RX/DR IN RCRD: CPT | Performed by: INTERNAL MEDICINE

## 2023-12-28 PROCEDURE — 3080F DIAST BP >= 90 MM HG: CPT | Performed by: INTERNAL MEDICINE

## 2023-12-28 PROCEDURE — 36415 COLL VENOUS BLD VENIPUNCTURE: CPT

## 2023-12-28 PROCEDURE — 93000 ELECTROCARDIOGRAM COMPLETE: CPT | Performed by: INTERNAL MEDICINE

## 2023-12-28 PROCEDURE — 1159F MED LIST DOCD IN RCRD: CPT | Performed by: INTERNAL MEDICINE

## 2023-12-28 PROCEDURE — 99214 OFFICE O/P EST MOD 30 MIN: CPT | Performed by: INTERNAL MEDICINE

## 2023-12-28 PROCEDURE — 86300 IMMUNOASSAY TUMOR CA 15-3: CPT | Performed by: INTERNAL MEDICINE

## 2023-12-28 RX ORDER — METOPROLOL SUCCINATE 50 MG/1
TABLET, EXTENDED RELEASE ORAL
Qty: 90 TABLET | Refills: 3 | Status: SHIPPED | OUTPATIENT
Start: 2023-12-28 | End: 2023-12-28 | Stop reason: SDUPTHER

## 2023-12-28 RX ORDER — METOPROLOL SUCCINATE 50 MG/1
TABLET, EXTENDED RELEASE ORAL
Qty: 270 TABLET | Refills: 3 | Status: SHIPPED | OUTPATIENT
Start: 2023-12-28

## 2023-12-28 RX ORDER — AMLODIPINE BESYLATE 2.5 MG/1
2.5 TABLET ORAL DAILY
Qty: 30 TABLET | Refills: 11 | Status: SHIPPED | OUTPATIENT
Start: 2023-12-28

## 2023-12-28 NOTE — TELEPHONE ENCOUNTER
----- Message from Elie Dixon MD sent at 12/28/2023  1:46 PM EST -----  CALL HER [facility]ER IS NORMAL AT [address]

## 2023-12-28 NOTE — TELEPHONE ENCOUNTER
Called patient and relayed message via voicemail. Left call back number should she have any questions. Ekta Gonzalez .[phone].

## 2023-12-28 NOTE — PROGRESS NOTES
Date of Office Visit: 2023  Encounter Provider: Danni Odell MD  Place of Service: [facility] CARDIOLOGY  Patient Name: Marylu Georges  :1960    Chief complaint  Cardio oncology care, paroxysmal atrial fibrillation,  Pulmonary hypertension    History of Present Illness  Patient is a 61-year-old female with history of hypertension, prior stroke, left-sided breast cancer.  She had a large mass that was neglected for a period of time and became quite gigantic and ulcerated.  By 2019 she was diagnosed with breast cancer.  She had significant improvement with AC therapy in 2019 (total dose 243 mg/m² of Adriamycin) and this was followed by Taxol therapy which is completed on 2019. She subsequently underwent bilateral mastectomy 2019 with axillary node dissection.  She also underwent radiation therapy completed on 2020 and has been on adjuvant Femara since 2019.  She was able to achieve remission with this.  She then presented on 2021 showing some activity on a PET scan in the left side chest wall.  It was appearing to abut the lower aspect of her heart.  Kisquali was initiated.  She was started on magnesium with concerns of QTC prolongation that can be associated with this agent.  On 2021 tumor marker CA 15-3 had dropped and there was resolution lymphadenopathy was noted by CT imaging.  Liver function tests were elevated and Kisquali  and then Femara were discontinued being seen by GI.  She has been treated with Aromasin.    In 2019 she had an echocardiogram in the setting of syncope echocardiogram that showed an ejection fraction of 60% with moderately dilated left atrium and negative saline injection.  I do not see any strain measurements.  Stress perfusion study was negative for ischemia.  Monitor showed few episodes of atrial tachycardia that were quite brief.  There is no residual malignancy,  She then received radiation therapy to bilateral chest  10/19-1/20.  With findings of mass near the pericardium follow-up echocardiogram was performed on 6/2021 that showed an ejection fraction 57% with GLS -19.9% with normal wall thickness trivial valve regurgitation no pericardial mass was appreciated.  And 6/2022 she was admitted with infectious arthritis discitis and myositis.  She was treated with antibiotics.  While in hospital she developed new onset atrial fibrillation with rapid rates.  Echo showed an ejection fraction of 55% with indeterminate diastolic function small patent foramen ovale with mild pulmonary hypertension with an RV systolic pressure 41 mmHg.  She was placed on Eliquis and metoprolol.  By 9/2022 she underwent synchronized cardioversion to sinus rhythm by Dr Gustafson.  She was seen in follow-up by EP service and while [id number] score is 1 with a history of carcinoma it was felt that her stroke risk was higher and Eliquis was continued.  Echo on 10/2023 showed an ejection fraction 61%, GLS -21.1% LV was mildly dilated with grade 1 diastolic dysfunction, atrial septum was redundant the injection was not performed there was aortic valve thickening without stenosis there is mild valve regurgitation with an RVSP of 39 mmHg.  Overall stable with slight increase in pulmonary pressures.  On July 2023 with mild normal EKG changes stress perfusion study was negative for ischemia.  She had recurrent atrial fibrillation for which he underwent successful electrical cardioversion in October 2023.  In the meanwhile she has been continued on Aromasin.     Since last visit patient's had shortness of breath palpitations syncope near syncope or edema.  Over the past 2 days she has had chest pain when she takes a deep breath.  [place]  Blood pressure today is quite high.  EKG today however showed recurrent atrial fibrillation with a heart rate of 85 bpm.  Intermittent chest pain that is midsternal nonradiating not clearly exertional but has been  present for 2 days.  This happens intermittently for the past several months typically occurs once every month.  It has lasted a bit longer at this time.  Blood pressure rechecked in the office was 175/84.  She admits to dietary instructions with salt over the holidays.    Past Medical History:   Diagnosis Date    Anemia in neoplastic disease     Arthritis     Arthritis of back     Arthritis of neck     Breast cancer     Left    CVA (cerebral vascular accident)     Encounter for long-term (current) use of other medications 06/22/2021    Fracture, fibula     Fracture, finger     Frozen shoulder     Hip arthrosis 01/17    Hip pain     RIGHT HIP... CYST    History of fracture of leg 1987    History of radiation therapy     LAST TREATMENT      Hypertension     Knee swelling     Limited joint range of motion (ROM)     RIGHT HIP    Low back strain     Periarthritis of shoulder     Rotator cuff syndrome 6/22    Skin sore     OPEN SORE LEFT BREAST    Syncope     Vertigo      Past Surgical History:   Procedure Laterality Date    AXILLARY LYMPH NODE BIOPSY/EXCISION Right     LYMPH NODE UNDER RIGHT ARM-MALIGNANT (DOUBLE MASTECTOMY)    BREAST BIOPSY Left     MALIGNANT    FRACTURE SURGERY  1987    Leg    HARDWARE REMOVAL      INCISION AND DRAINAGE LEG Left 06/30/2022    Procedure: INCISION AND DRAINAGE left ankle;  Surgeon: Kenneth Tran MD;  Location: [facility];  Service: Orthopedics;  Laterality: Left;    JOINT REPLACEMENT Bilateral mar 2020,july 2021    hips    MASTECTOMY W/ SENTINEL NODE BIOPSY Bilateral 09/16/2019    Procedure: BILATERLA MODIFIED RADICAL MASTECTOMY WITH BILATERAL SENTINEL LYMPH NODE BIOPSY;  Surgeon: Joby Barron Jr., MD;  Location: [facility];  Service: General    TOTAL HIP ARTHROPLASTY Right 03/02/2020    Procedure: RIGHT TOTAL HIP ARTHROPLASTY NATALY NAVIGATION;  Surgeon: Luis M Leonard MD;  Location: [facility];  Service: Orthopedics;  Laterality: Right;    TOTAL HIP  ARTHROPLASTY Left 07/20/2021    Procedure: Posterior LEFT TOTAL HIP ARTHROPLASTY NATALY NAVIGATION;  Surgeon: Luis M Leonard MD;  Location: [facility] OR;  Service: Orthopedics;  Laterality: Left;    VENOUS ACCESS DEVICE (PORT) INSERTION Right 02/01/2019    Procedure: INSERTION VENOUS ACCESS DEVICE;  Surgeon: Joby Barron Jr., MD;  Location: [facility] OR [facility];  Service: General    VENOUS ACCESS DEVICE (PORT) REMOVAL N/A 10/30/2019    Procedure: Mediport Removal;  Surgeon: Joby Barron Jr., MD;  Location: [facility] OR;  Service: General     Outpatient Medications Prior to Visit   Medication Sig Dispense Refill    acetaminophen (TYLENOL) 325 MG tablet Take 2 tablets by mouth Every 6 (Six) Hours As Needed.      apixaban (ELIQUIS) 5 MG tablet tablet Take 1 tablet by mouth Every 12 (Twelve) Hours. Indications: Atrial Fibrillation 60 tablet 5    doxylamine (UNISOM) 25 MG tablet Take 1 tablet by mouth At Night As Needed for Sleep.      exemestane (AROMASIN) 25 MG tablet TAKE 1 TABLET BY MOUTH DAILY 30 tablet 3    polyethylene glycol 17 g packet Take 17 g by mouth 2 (Two) Times a Day As Needed (constipation).      tiZANidine (ZANAFLEX) 4 MG tablet Take 1 tablet by mouth Every 6 (Six) Hours As Needed for Muscle Spasms.      metoprolol succinate XL (TOPROL-XL) 50 MG 24 hr tablet TAKE ONE TABLET BY MOUTH TWICE A DAY 60 tablet 5    Cholecalciferol 250 MCG (46222 UT) tablet Take 1 tablet by mouth. As directed       Facility-Administered Medications Prior to Visit   Medication Dose Route Frequency Provider Last Rate Last Admin    Chlorhexidine Gluconate Cloth 2 % pads 1 each  1 each Apply externally Take As Directed Luis M Leonard MD           Allergies as of 12/28/2023 - Reviewed 12/28/2023   Allergen Reaction Noted    Benadryl [diphenhydramine] Itching 02/18/2020    Erythromycin GI Intolerance 01/17/2019    Levaquin [levofloxacin] GI Intolerance 03/07/2019    Penicillins GI Intolerance 01/17/2019     Social History  "    Socioeconomic History    Marital status:      Spouse name: Cruz    Number of children: 0   Tobacco Use    Smoking status: Never    Smokeless tobacco: Never   Vaping Use    Vaping Use: Never used   Substance and Sexual Activity    Alcohol use: Yes     Comment: occasional    Drug use: No    Sexual activity: Not Currently     Partners: Male     Birth control/protection: Abstinence     Family History   Problem Relation Age of Onset    Lung cancer Father     Irritable bowel syndrome Father     Cancer Mother     Breast cancer Mother     Liver disease Mother     Breast cancer Sister 48    Breast cancer Maternal Grandmother     Breast cancer Paternal Grandmother     Breast cancer Maternal Uncle     Malig Hyperthermia Neg Hx      Review of Systems   Constitutional: Negative for chills, fever, weight gain and weight loss.   Cardiovascular:  Positive for palpitations. Negative for leg swelling.   Respiratory:  Negative for cough, snoring and wheezing.    Hematologic/Lymphatic: Negative for bleeding problem. Does not bruise/bleed easily.   Skin:  Negative for color change.   Musculoskeletal:  Negative for falls, joint pain and myalgias.   Gastrointestinal:  Negative for melena.   Genitourinary:  Negative for hematuria.   Neurological:  Negative for excessive daytime sleepiness.   Psychiatric/Behavioral:  Negative for depression. The patient is not nervous/anxious.         Objective:     Vitals:    12/28/23 0823   BP: (!) 180/118   BP Location: Right arm   Patient Position: Sitting   Cuff Size: Small Adult   Pulse: 85   Weight: 80.1 kg (176 lb 9.6 oz)   Height: 167.6 cm (66\")     Body mass index is 28.5 kg/m².    Vitals reviewed.   Constitutional:       Appearance: Well-developed.   Eyes:      General: No scleral icterus.        Right eye: No discharge.      Conjunctiva/sclera: Conjunctivae normal.      Pupils: Pupils are equal, round, and reactive to light.   HENT:      Head: Normocephalic.      Nose: Nose normal. "   Neck:      Thyroid: No thyromegaly.      Vascular: No JVD.   Pulmonary:      Effort: Pulmonary effort is normal. No respiratory distress.      Breath sounds: Normal breath sounds. No wheezing. No rales.   Cardiovascular:      Normal rate. Irregularly irregular rhythm. Normal S2.       Murmurs: There is no murmur.      No gallop.    Pulses:     Intact distal pulses.      Carotid: 2+ bilaterally.     Radial: 2+ bilaterally.     Femoral: 2+ bilaterally.     Popliteal: 2+ bilaterally.     Dorsalis pedis: 2+ bilaterally.     Posterior tibial: 2+ bilaterally.  Edema:     Peripheral edema absent.   Abdominal:      General: Bowel sounds are normal. There is no distension.      Palpations: Abdomen is soft.      Tenderness: There is no abdominal tenderness. There is no rebound.   Musculoskeletal: Normal range of motion.         General: No tenderness.      Cervical back: Normal range of motion and neck supple. Skin:     General: Skin is warm and dry.      Findings: No erythema or rash.   Neurological:      Mental Status: Alert and oriented to person, place, and time.   Psychiatric:         Behavior: Behavior normal.         Thought Content: Thought content normal.         Judgment: Judgment normal.       Lab Review:   Lab Results - Last 18 Months   Lab Units 12/28/23  1035 09/27/23  1128   WBC 10*3/mm3 4.85 4.60   RBC 10*6/mm3 4.80 4.62   HEMOGLOBIN g/dL 14.5 14.1   HEMATOCRIT % 44.7 44.4   MCV fL 93.1 96.1   MCH pg 30.2 30.5   MCHC g/dL 32.4 31.8   RDW % 14.3 14.2   PLATELETS 10*3/mm3 231 210   NEUTROPHIL % % 68.1 59.8   LYMPHOCYTE % % 22.7 28.9   MONOCYTES % % 6.4 7.2   EOSINOPHIL % % 1.4 2.6   BASOPHIL % % 1.0 1.1   NEUTROS ABS 10*3/mm3 3.30 2.75   LYMPHS ABS 10*3/mm3 1.10 1.33   MONOS ABS 10*3/mm3 0.31 0.33   EOS ABS 10*3/mm3 0.07 0.12   BASOS ABS 10*3/mm3 0.05 0.05   RDW-SD fl 49.1 50.9   MPV fL 8.2 8.5       Lab Results - Last 18 Months   Lab Units 12/28/23  1035 09/27/23  1128   GLUCOSE mg/dL 106* 93   BUN mg/dL 13  15   CREATININE mg/dL 0.94 0.92   SODIUM mmol/L 140 140   POTASSIUM mmol/L 4.6 5.3*   CHLORIDE mmol/L 103 104   CO2 mmol/L 27.0 22.8   CALCIUM mg/dL 9.9 9.6   TOTAL PROTEIN g/dL 7.5 6.7   ALBUMIN g/dL 4.7 4.3   ALT (SGPT) U/L 10 13   AST (SGOT) U/L 24 22   ALK PHOS U/L 107 97   BILIRUBIN mg/dL 0.5 0.5   GLOBULIN gm/dL 2.8 2.4   A/G RATIO g/dL 1.7 1.8   BUN / CREAT RATIO  13.8 16.3   ANION GAP mmol/L 10.0 13.2   EGFR mL/min/1.73 67.5 69.2         ECG 12 Lead    Date/Time: 12/28/2023 2:08 AM  Performed by: Danni Odell MD    Authorized by: Danni Odell MD  Comparison: compared with previous ECG   Similar to previous ECG  Rhythm: atrial fibrillation  Other findings: non-specific ST-T wave changes    Clinical impression: abnormal EKG        Assessment:       Diagnosis Plan   1. Cardiac mass  Adult Transthoracic Echo Complete W/ Cont if Necessary Per Protocol    CT Angiogram Chest      2. Precordial pain  CT Angiogram Chest        Plan:       1.  Chest pain.  Concern is pleuritic component to her pain but noted by mass near the pericardium.  Will check CT angiogram of the chest to assess for this as well as pulmonary emboli.  Also check an cardiogram.  Discussed with Dr. Dixon.  2.  Hypertension.  She will try to improve diet and discussions held.  In the meanwhile Toprol-XL to 100 mg in a.m. and 50 mg in p.m.  Also give her 2.5 mg of amlodipine in office and a prescription for her to take this on a as needed once a day for systolic pressures greater than 150 mmHg.    3.  Abnormal EKG with ST-T wave changes.  Stress perfusion scan negative for ischemia in July 2023.  No anginal symptoms.  4.  Paroxysmal atrial fibrillation, status post electrical cardioversion to sinus rhythm x 2.  Now back in atrial fibrillation with a controlled rate.  Depending on results of above studies, consider follow-up evaluation additional treatment options.  It was  5.  Metastatic breast cancer.  Felt to be stable and now on Aromasin  6.   Pericardial mass.  Resolved on chemotherapy.  As above   7   Hypertension, as above  6.  Pulmonary hypertension.  Slightly elevated with RVSP of 39 mmHg.  Stable on echo 7/2023  7.  Elevated liver function tests.   8.  Arthritis, septic.  Treated with antibiotics IV.      Time Spent: I spent 45 minutes caring for Marylu on this date of service. This time includes time spent by me in the following activities: preparing for the visit, reviewing tests, obtaining and/or reviewing a separately obtained history, performing a medically appropriate examination and/or evaluation, counseling and educating the patient/family/caregiver, ordering medications, tests, or procedures, documenting information in the medical record, independently interpreting results and communicating that information with the patient/family/caregiver, and care coordination.   I spent 1 minutes on the separately reported service of ECG. This time is not included in the time used to support the E/M service also reported today.        Your medication list            Accurate as of December 28, 2023 11:59 PM. If you have any questions, ask your nurse or doctor.                START taking these medications        Instructions Last Dose Given Next Dose Due   amLODIPine 2.5 MG tablet  Commonly known as: NORVASC  Started by: Danni Odell MD      Take 1 tablet by mouth Daily.       metoprolol succinate XL 50 MG 24 hr tablet  Commonly known as: TOPROL-XL  Started by: Danni Odell MD      Take 2 tablets po q am and one tablet po q pm              CONTINUE taking these medications        Instructions Last Dose Given Next Dose Due   acetaminophen 325 MG tablet  Commonly known as: TYLENOL      Take 2 tablets by mouth Every 6 (Six) Hours As Needed.       apixaban 5 MG tablet tablet  Commonly known as: ELIQUIS      Take 1 tablet by mouth Every 12 (Twelve) Hours. Indications: Atrial Fibrillation       Cholecalciferol 250 MCG (44298 UT) tablet      Take 1 tablet by  mouth. As directed       doxylamine 25 MG tablet  Commonly known as: UNISOM      Take 1 tablet by mouth At Night As Needed for Sleep.       exemestane 25 MG tablet  Commonly known as: AROMASIN      TAKE 1 TABLET BY MOUTH DAILY       polyethylene glycol 17 g packet  Commonly known as: MIRALAX      Take 17 g by mouth 2 (Two) Times a Day As Needed (constipation).       tiZANidine 4 MG tablet  Commonly known as: ZANAFLEX      Take 1 tablet by mouth Every 6 (Six) Hours As Needed for Muscle Spasms.                 Where to Get Your Medications        These medications were sent to [facility] [id number] - [place], [place] - [address] [place] - [phone] [facility] [phone]   [address] , [place] [address]      Phone: [phone]   amLODIPine 2.5 MG tablet  metoprolol succinate XL 50 MG 24 hr tablet         Patient is no longer taking -.  I corrected the med list to reflect this.  I did not stop these medications.      Dictated utilizing Dragon dictation

## 2023-12-28 NOTE — PROGRESS NOTES
Subjective     REASON FOR FOLLOW UP:  1. 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 R BREAST MASS WITH GIANT R AXILLARY ADENOPATHY.  SHE UNDERWENT DOSE DENSE AC, TAXOL, BILATERAL MASTECTOMIES, DRAMATIC NEAR COMPLETE AL, RADIATION THERAPY AND ADJUVANT FEMARA STOPPED ON 12/21 BECAUSE DRUG INDUCED HEPATITIS, SWITCHED TO AROMASIN 2/22: NO SIDE EFFECTS.    3. FAMILY HISTORY OF BREAST CANCER IN MOTHER AND SISTER, SISTER WAS TESTED FOR BRCA AND WAS NEGATIVE.    4. LEFT OVARIAN CYST , IT HAS BEEN PRESENT FOR LONG TIME BUT IS GETTING LARGER, ASYMPTOMATIC, NEED FOR , PELVIC US AND GYN ONC EVal: no need for any intervention                  5. LEFT HIP PAIN SEVERE ARTHRITIS, REAL NEED FOR LEFT HIP REPLACEMENT: PERFORMED 8/21    6. DRUG INDUCED HEPATITIS,  FINALLY STOPPED FEMARA: DRAMATIC IMPROVEMENT AND RESOLUTION.      History of present illness:    On 12/28/2023 this 63-year-old female returns to the office in company of her brother in order to be reviewed. She has been in the office of, Danni Odell MD, Cardiology this morning and Dr. Odell called me concerned that the patient was having occasional intermittent pleuritic pain in the chest wall anteriorly on the right side in Absence of any pain in the rib cage per se. She has not had any cough, sputum production, hemoptysis or shortness of breath. Her appetite has remained excellent, she has gained weight and she was eating exaggerated amount of salt to the point that her blood pressure is on the rise very dramatically. The patient is back into atrial fibrillation.     She has history of bilateral breast cancers. She was treated according to protocol above, she achieved a complete remission and she remains on Aromasin since Femara triggered very significant drug induced hepatitis. Aromasin is taken on a daily basis 25 mg a day with no side effects. The patient has proper bowel function, proper urination. She admits that she is taking  ibuprofen at least 4-6 tablets a week. This also could be raising her blood pressure.    She is not drinking any alcohol at this time. Besides this no other issues besides her chronic arthritic pain in hips, elbows, hands from time to time. She is functional and capable of taking care of herself.           Past Medical History:   Diagnosis Date    Anemia in neoplastic disease     Arthritis     Arthritis of back     Arthritis of neck     Breast cancer     Left    CVA (cerebral vascular accident)     Encounter for long-term (current) use of other medications 06/22/2021    Fracture, fibula     Fracture, finger     Frozen shoulder     Hip arthrosis 01/17    Hip pain     RIGHT HIP... CYST    History of fracture of leg 1987    History of radiation therapy     LAST TREATMENT      Hypertension     Knee swelling     Limited joint range of motion (ROM)     RIGHT HIP    Low back strain     Periarthritis of shoulder     Rotator cuff syndrome 6/22    Skin sore     OPEN SORE LEFT BREAST    Syncope     Vertigo            Past Surgical History:   Procedure Laterality Date    AXILLARY LYMPH NODE BIOPSY/EXCISION Right     LYMPH NODE UNDER RIGHT ARM-MALIGNANT (DOUBLE MASTECTOMY)    BREAST BIOPSY Left     MALIGNANT    FRACTURE SURGERY  1987    Leg    HARDWARE REMOVAL      INCISION AND DRAINAGE LEG Left 06/30/2022    Procedure: INCISION AND DRAINAGE left ankle;  Surgeon: Kenneth Tran MD;  Location: [facility];  Service: Orthopedics;  Laterality: Left;    JOINT REPLACEMENT Bilateral mar 2020,july 2021    hips    MASTECTOMY W/ SENTINEL NODE BIOPSY Bilateral 09/16/2019    Procedure: BILATERLA MODIFIED RADICAL MASTECTOMY WITH BILATERAL SENTINEL LYMPH NODE BIOPSY;  Surgeon: Joby Barron Jr., MD;  Location: [facility];  Service: General    TOTAL HIP ARTHROPLASTY Right 03/02/2020    Procedure: RIGHT TOTAL HIP ARTHROPLASTY NATALY NAVIGATION;  Surgeon: Luis M Leonard MD;  Location: [facility] OR;  Service: Orthopedics;   Laterality: Right;    TOTAL HIP ARTHROPLASTY Left 07/20/2021    Procedure: Posterior LEFT TOTAL HIP ARTHROPLASTY NATALY NAVIGATION;  Surgeon: Luis M Leonard MD;  Location: [facility] OR;  Service: Orthopedics;  Laterality: Left;    VENOUS ACCESS DEVICE (PORT) INSERTION Right 02/01/2019    Procedure: INSERTION VENOUS ACCESS DEVICE;  Surgeon: Joby Barron Jr., MD;  Location: [facility] OR [facility];  Service: General    VENOUS ACCESS DEVICE (PORT) REMOVAL N/A 10/30/2019    Procedure: Mediport Removal;  Surgeon: Joby Barron Jr., MD;  Location: [facility] OR;  Service: General        Current Outpatient Medications on File Prior to Visit   Medication Sig Dispense Refill    acetaminophen (TYLENOL) 325 MG tablet Take 2 tablets by mouth Every 6 (Six) Hours As Needed.      apixaban (ELIQUIS) 5 MG tablet tablet Take 1 tablet by mouth Every 12 (Twelve) Hours. Indications: Atrial Fibrillation 60 tablet 5    Cholecalciferol 250 MCG (02030 UT) tablet Take 1 tablet by mouth. As directed      doxylamine (UNISOM) 25 MG tablet Take 1 tablet by mouth At Night As Needed for Sleep.      exemestane (AROMASIN) 25 MG tablet TAKE 1 TABLET BY MOUTH DAILY 30 tablet 3    polyethylene glycol 17 g packet Take 17 g by mouth 2 (Two) Times a Day As Needed (constipation).      tiZANidine (ZANAFLEX) 4 MG tablet Take 1 tablet by mouth Every 6 (Six) Hours As Needed for Muscle Spasms.      [DISCONTINUED] digoxin (LANOXIN) 125 MCG tablet Take 1 tablet by mouth Daily.      [DISCONTINUED] metoprolol succinate XL (TOPROL-XL) 50 MG 24 hr tablet TAKE ONE TABLET BY MOUTH TWICE A DAY 60 tablet 5     Current Facility-Administered Medications on File Prior to Visit   Medication Dose Route Frequency Provider Last Rate Last Admin    Chlorhexidine Gluconate Cloth 2 % pads 1 each  1 each Apply externally Take As Directed Luis M Leonard MD            ALLERGIES:    Allergies   Allergen Reactions    Benadryl [Diphenhydramine] Itching     INCREASES BLOOD PRESSURE     "Erythromycin GI Intolerance    Levaquin [Levofloxacin] GI Intolerance    Penicillins GI Intolerance        Social History     Socioeconomic History    Marital status:      Spouse name: Cruz    Number of children: 0   Tobacco Use    Smoking status: Never    Smokeless tobacco: Never   Vaping Use    Vaping Use: Never used   Substance and Sexual Activity    Alcohol use: Yes     Comment: occasional    Drug use: No    Sexual activity: Not Currently     Partners: Male     Birth control/protection: Abstinence        Family History   Problem Relation Age of Onset    Lung cancer Father     Irritable bowel syndrome Father     Cancer Mother     Breast cancer Mother     Liver disease Mother     Breast cancer Sister 48    Breast cancer Maternal Grandmother     Breast cancer Paternal Grandmother     Breast cancer Maternal Uncle     Malig Hyperthermia Neg Hx             Objective     Vitals:    12/28/23 1051   BP: Comment: 160/130   Pulse: 86   Resp: 18   Temp: 97.8 °F (36.6 °C)   TempSrc: Temporal   SpO2: 100%   Weight: 79.8 kg (176 lb)   Height: 167.6 cm (65.98\")   PainSc: 0-No pain           12/28/2023    10:46 AM   Current Status   ECOG score 0     Exam:               GENERAL:  Well-developed, Patient  in no acute distress.   SKIN:  Warm, dry ,NO purpura ,no rash.  HEENT:  Pupils were equal and reactive to light and accomodation, conjunctivae noninjected,  normal visual acuity.   NECK:  Supple with good range of motion; no thyromegaly , no JVD or bruits,.No carotid artery pain, no carotid abnormal pulsation   LYMPHATICS:  No cervical, NO supraclavicular, NO axillary, NO inguinal adenopathies.  CARDIAC   normal rate , regular rhythm, without murmur,NO rubs NO S3 NO S4   LUNGS: normal breath sounds bilateral, no wheezing, NO rhonchi, NO crackles ,NO rubs.  VASCULAR VENOUS: no cyanosis, NO collateral circulation, NO varicosities, NO edema, NO palpable cords, NO pain,NO erythema, NO pigmentation of the skin.  ABDOMEN:  " Soft, NO pain,no hepatomegaly, no splenomegaly,no masses, no ascites, no collateral circulation,no distention.  EXTREMITIES  AND SPINE:  No clubbing, no cyanosis ,no deformities , no pain .No kyphosis,  no pain in spine, no pain in ribs , no pain in pelvic bone.  NEUROLOGICAL:  Patient was awake, alert, oriented to time, person and place.  Chest wall shows bilateral mastectomies with telangectasia's at the surgical site related to radiation therapy. No masses, nodularity or tenderness. A careful palpation of the sternum, rib cage, cartilages in the rib cage disclose no areas of tenderness or abnormality whatsoever.         RECENT LABS:  Hematology Results from last 7 days   Lab Units 12/28/23  1035   WBC 10*3/mm3 4.85   NEUTROS ABS 10*3/mm3 3.30   HEMOGLOBIN g/dL 14.5   HEMATOCRIT % 44.7   PLATELETS 10*3/mm3 231     Results from last 7 days   Lab Units 12/28/23  1035   SODIUM mmol/L 140   POTASSIUM mmol/L 4.6   CHLORIDE mmol/L 103   CO2 mmol/L 27.0   BUN mg/dL 13   CREATININE mg/dL 0.94   CALCIUM mg/dL 9.9   ALBUMIN g/dL 4.7   BILIRUBIN mg/dL 0.5   ALK PHOS U/L 107   ALT (SGPT) U/L 10   AST (SGOT) U/L 24   GLUCOSE mg/dL 106*                  Assessment & Plan    1.  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   For at least 4 years, she has had an in crescendo mass in the left breast that has produced a gigantic tumor that was close to 25 cm in size and is replacing most of the anatomy of the left breast. There are areas of ulceration necrosis and bleeding and the mass is fixed to the chest wall. She has abundant amount of collateral circulation in the left anterior chest wall and it caught my attention the lack of any left axillary adenopathy. She has no lymphedema in the left upper extremity. In the right breast while in sitting position, no palpable abnormalities are documented. The right breast skin and nipple are  normal. When the patient lies flat, there is a palpable mass at 9 o'clock from the nipple that measures probably 4 cm in size, very indistinct in regard to edges and margins. There was no mobility and no areas of tenderness. She has a giant adenopathy in the right axilla that measures close to 9 cm in size, uniform, no fluctuation, nontender, completely fixed to the axillary wall. There is no compromise of the skin.   Patient completed 4 cycles of neoadjuvant AC on 4/12/2019.    Patient completed neoadjuvant weekly Taxol x 12 treatments on 7/19/2019.  9/16/2019 bilateral mastectomy by Dr. Joby Barron with axillary lymph node dissection.  No residual malignancy.  10/30/2019 patient's Mediport was removed in anticipation of radiation.  Radiation therapy under the care of Dr. Marmolejo 10/31/2019-1/13/2020 to the right chest wall.  Started on adjuvant Femara 2.5 mg daily 8/20/2019.  9/21/2020 continues on adjuvant Femara, tolerating it quite well.  Some mild hot flashes and mild arthralgias, all which are tolerable.  The patient returned to the office on 05/24/2021. Since the previous visit the patient proceeded with a PET scan and I have reviewed this with her in the PAC system showing chest wall on the left side area of enlightening SUV activity that is almost 3 cm long x 7 mm wide. It is behind the bone. It is very close to the lower aspect of her heart. The reset of the PET scan discloses no activity. This is also specific in regard to the ovaries and actually an ultrasound of her vagina looking into the ovaries documented an unilocular cyst on the left side with typical features of benignity and a  was completely normal 5.5.   The patient was further reviewed on 07/07/2021 after she has continued her Kisqali and completion of the 1st round of the medicine. Today her EKG disclosed a normal QT interval and this has been sent to Cardiology to be reported officially. She has not developed any rash but she has  leukopenia induced by Kisqali. She has completed her radiation therapy to the epicardial right lymph node with no difficulties or side effects.   The patient was further reviewed on 09/30/2021. Patient has recovered well from her left hip replacement. She completed her Kisqali a week ago. Her white count is low today. The hemoglobin is stable. The platelet count is stable. Elevation in her liver function test, SGOT, SGPT noted. The patient is not drinking any alcohol. She is not taking any cholesterol medicine. She is not taking any anti-inflammatory medication and leads me to think that Kisqali is the culprit of this. Given these findings we advised the patient to put the Kisqali on hold until we recheck chemistry profile on weekly basis for the next 3 or 4 weeks. We need to settle these numbers down before she continues the Kisqali. She probably will require dose adjustment at some point. Again, her hepatitis A, B and C serologies are negative.   n regard to her history of breast cancer she was reviewed on 11/19/2021. Since the previous visit the patient has had tumor marker CA 15-3 that has dropped to 20. Radiological assessment of her chest and abdomen CT scan that documents resolution of the epicardial lymphadenopathy in the right hemithorax, no pulmonary nodules or metastasis, no pleural effusion and no liver metastasis. No bone metastasis. Based on this information I do believe that the breast cancer is relatively quiet clinically, biochemically and radiologically and I advised her today to resume her Femara at the dose of 2.5 mg a day. The likelihood of Femara producing liver dysfunction is more than remote.   The patient was further reviewed on 12/29/2021. Recent review of her liver function tests a few days ago documented persistent elevation of her SGOT, SGPT, and alkaline phosphatase. She has discontinued most of the medicines and I have no other choice but discontinue the Femara. Since then and actually  today is the 1st day in many weeks that the SGOT and SGPT and alkaline phosphatase are improving. The patient actually remains asymptomatic in this regard. Her liver is not enlarged. She has no jaundice. She has no rash, no skin lesions and no other new alterations. That is good news. Her white blood count, hemoglobin and platelets remain stable. 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 That is extremely rare. As I posted above, I discussed with all my partners in regard to how many times through their careers prescribing Femara to multiple breast cancers they have seen Femara triggering hepatitis, none of them gave me a positive answer. I reviewed this information in [employer] and it is reported in less than 1% of patients taking this medication. I think we are in front of a rare situation but I want for her to withhold any further Femara treatment for the time being and I want to review her back in 3 weeks. If the liver function tests continue improving or normalize by then maybe we will have the answer once and for all. Raises the question what we will do next and I think the next medicine will be the utilization of Aromasin that has a different chemical component and different methodology for action. I made her aware of that. We are not going to go back in giving her Kisqali under the present circumstances given the confusion and all the issues pertinent to her liver dysfunction.   The patient was further reviewed on 03/07/2022. Since the previous visit the patient has stopped the Femara in 12/2021 and today her liver function tests are completely back to normality. This proves the point that Femara was the culprit phenomenon that I never have seen and discussed with my partner, Danelle Cespedes MD. He never has seen this neither.   The patient returned on 05/09/2022  with no symptoms of anything in particular besides minimal respiratory allergies and pain in the left hip associated with her previous surgery. Her clinical examination today is very much unremarkable, she looks fantastic. She has no symptoms or signs of breast cancer recurrence. White count, hemoglobin and platelets are normal. Her tumor marker CA 15-3 has remained normal and actually lower than ever and compliance with Aromasin has been 100% with excellent tolerance.   Given the circumstances of this I advised her to remain on Aromasin 25 mg a day and I find no use for this patient to go back onto Kisqali or medicines of this nature.   On 08/16/2022 we reviewed the patient in regard to her history of breast cancer. Clinically she has not had any obvious recurrent disease in the chest wall in the lung anatomy or abdomen or pelvis. She remains on Aromasin adjuvantly and we are waiting to review tumor marker. She has a chest wall that is completely flat due to previous mastectomies and radiation therapy. There is no axillary adenopathies and the patient has in my opinion control of her disease process as far as we can tell. I advised her to remain on Aromasin 25 mg a day. I went ahead and scheduled a visit with us in a few weeks in order to further review radiological analysis that will be discussed below.  On 09/14/2022 the patient has had in the interim a CT scan of the chest, abdomen and pelvis for review personally. Her pulmonary anatomy remains normal, there is no pulmonary congestion, pleural effusions, pericardial effusion, cardiomegaly, hilar or mediastinal adenopathy. There is prepericardiac lymph node measuring almost 1.5 to 2 cm in size that is flat like a small finger that has been present before and has minimally enlarged. If this has anything to do with her previous history of breast cancer is difficult to state but this has been evaluated before with similarity in regards size and some degree of  fluctuation. I think this does not constrain me from thinking with a normal tumor marker of CA 15-3 of 20 during the previous visit or compel to perform either removal or biopsy at this time or proceed with radiological assessment with a PET scan. I do believe that this is not representation of anything in particular. Her liver anatomy and the rest of the bony anatomy, pancreas, kidneys, spleen and retroperitoneum remain unchanged. Given this finding I advised the patient to proceed and continue her Aromasin without the need to add any other medication to her regimen of medication especially in the background of A-fib. I advised her to proceed with reassessment with me in 6 weeks with a repeat CBC, CMP and CA 15-3. In that moment also we will obtain a liquid biopsy for further analysis. Her liver function test has remained normal and Aromasin has not produced any chemical hepatitis. I advised her again to remain on this medicine by itself.   On 10/25/2022 the patient remains on Aromasin. She has no symptoms or signs that would indicate breast cancer recurrence and the latest tumor marker CA 15-3 was 20 two months ago. We have another one pending today. Given these circumstances I advised her to remain on Aromasin for the time being. I find no need for radiological assessment at this time. She will be called at home with the report of her tumor marker.   On 12/28/2022 the patient had no symptoms or signs of breast cancer progression or recurrence on any level. Tolerance to Aromasin is excellent with no side effects and she has 100% compliance on the medication. She was advised to remain on the medication. Her tumor marker CA 15-3 has remained normal. No plans for radiological assessment unless new clinical changes occur or tumor marker changes.   On 03/29/2023 the patient has no symptoms or signs of breast cancer recurrence. She continues taking her aromatase inhibitor medicine on a routine basis with 100% compliance  and no significant side effects from the medication. Clinically she has no evidence of breast cancer recurrence and during the previous visit her CA 15-3 remained normal, another one is pending today that will be discussed with her on the telephone once it becomes available. Given the stability of her clinical picture and the excellent clinical status I advised the patient to remain on her medicine for the time being returning to see us back every 3 months with a CBC, CMP and CA 15-3. I find no need for radiological assessment on her at this point.   6/27/2023: Patient reviewed back today in office. She does not have any clinical findings suggestive of malignancy reoccurrence. She remains on Aromasin daily. Labs reviewed. CBC and CMP are both normal. CA 15-3 is normal at 18.5. Patient encouraged to become more active to help with weight gain and arthritis.   On 09/27/2023 the patient was further reviewed. She has no symptoms or signs of breast cancer recurrence. Her chest wall is unremarkable, the lymphedema in the left upper extremity is a grade 1 at the most controlled and she has no need for any elastic support. She has not developed any cellulitis or infection in the left upper extremity. The patient is taking Aromasin adjuvantly 100% compliance, no evidence of recurrent disease. Normal white count, hemoglobin and platelets pending chemistry profile. We advised her to remain on Aromasin 25 mg a day. She will be returning to see us back in 3 months with repeat CBC, CMP and CA 15-3. No need for radiological assessment on her at this point.    On 12/28/2023 no symptoms or signs of breast cancer recurrence. My conversation with, Danni Odell MD, today took place in regard to the potentiality for pleuritic pain on the right side. Lung auscultation is normal and the patient has no symptoms. This pain comes and goes very sporadically and is not bothering her on routine basis. Knowing that she has had a minimal pericardial  alteration before we opted to proceed with a CT angiogram of the chest that will be done tomorrow and we will review this with the patient at some point on the telephone in the next 48 hours or so.     If we assume that her cancer marker CA 15-3 remains stable the patient will remain on Aromasin and we will review her radiological analysis again. Otherwise plan to review her back in 3 months with repeat CBC, CMP and CA 15-3.       2. Left hip pain.   05/17/2021: Patient reports new complaint of progressive discomfort and the inability to function given the pain in the left hip area. Now she is limping. The only time when she is not in discomfort with the left hip is when she is sitting or lying in bed or riding the horse when gravity takes away the pressure of her left hip area. Radiologically speaking the CT scan of the chest, abdomen and pelvis to my eyes disclosed no abnormalities besides a very simple ovarian cyst that measures close to 7 x 5 cm with no trabeculation. There is no pelvic ascites. There is no pelvic adenopathy. The other abnormality obviously visible is the severe degree of scoliosis of the thoracic, lumbar spines.   It caught my attention the subchondral cyst in the left hip and the minimal if any space leftover between the head of the femur and the acetabulum. There is no metastasis in this anatomical site.   The radiologist mentioned cardiophrenic angle lymphadenopathy. I cannot see that from my naked eyes. I do not know what it is and I do not know how to express it. Pulmonary anatomy is normal. Hilar adenopathy is normal. No pericardial effusion. No pleural effusion. Minimal infiltrate in the lungs related to radiation changes. Liver anatomy, pancreas and kidneys were unremarkable. The scoliosis is very obvious in the view of the abdomen.   Patient refereed with Dr. Leonard, orthopedic surgeon.   Patient underwent left hip replacement and recovered well.   On 09/27/2023 her left hip pain is very  minimal at this time, she is not limping. 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 Her osteoarthritis in her hands is still a prevalent issue with aches and pains periodically. No need for pain medicine at this time besides Tylenol.     On 12/28/2023 she has arthritic pain in multiple sites in her back, in her hips, in her knees, in her hands. She takes now as per my advice Tylenol and I told her absolutely no antiinflammatory medications whatsoever.       3. Ovarian cyst  05/17/2021: Her CA 15-3 was minimal elevated in comparison to previous assessment and pelvic ultrasound ordered. t raises the following question.   05/24/2021: This is also specific in regard to the ovaries and actually an ultrasound of her vagina looking into the ovaries documented an unilocular cyst on the left side with typical features of benignity and a  was completely normal 5.5.   6/27/2023: CA 15-3 is normal at 18.5.  On 09/27/2023 no issues pertinent to this. This will remain in observation.    On 12/28/2023 this is asymptomatic. No issues pertinent to this at this time.       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   Her liver enzymes are completely normal today. She remains on Aromasin and obviously this patient never will be back on letrozole under any circumstances. This was documented through liver biopsy and through removal of the medication that triggered quick improvement in her liver function test.   On 09/14/2022 there is no evidence of drug induced hepatitis. Aromasin will be continued. The patient is aware that she never will be able to take Femara.   On 10/25/2022 her liver enzymes are completely normal today. Since discontinuation of Femara her drug induced hepatitis has resolved. This situation was extremely rare.   ON 12/28/2022 no issues pertinent to liver function after discontinuation of Femara months ago.   On 03/29/2023 waiting to  review her liver function. All of the abnormalities resolved after stopping letrozole.   6/27/2023: Liver enzymes remain normal.   No issues pertinent to this. She remains is observation.    On 12/28/2023 her liver function test is completely normal today. Aromasin is not producing any issues.      5. Septic Arthritis  The patient has developed an episode of septic arthritis in many joints including left shoulder and left ankle. She required antibiotic therapy as per recently. Infectious Disease was involved in this. In fact I discussed the case with them on the telephone. I suggested choosing the PICC line to be placed on the right arm in order to be able to deliver antibiotic therapy according to the proper indication. She completed the PICC line and it was removed last week with no complications or problems. The right upper extremity is completely normal. It is difficult for me to know why she developed the septic arthritis. She is in contact with horses all the time. The pathogen that she had in the joint is very uncommon in my opinion and the patient had no obvious source including no sinuses, no dental source, no obvious cardiac source, no gastrointestinal or genitourinary source and no skin source. It raises the question if this pathogen could evade to colonic source and I think I feel the obligation for this patient to have at least a CT scan of the abdomen and pelvis and eventually in several months from now consider a colonoscopy. Another consideration could be a Cologuard in some way. At least the patient’s infection seems to be under control. Her joints are still sore today including left shoulder, left ankle. Local inflammatory signs are very much gone. The only area of inflammation that she has in the 1st MP joint on the left hand probably represents osteoarthritis, no more than that. She will remain in observation from this point of view.  On 09/14/2022 the patient has no symptoms that would suggest  any further spreading or new development of septic arthritis. My fear was related to the possible seeding of her hip replacement areas by bacteria during the bacteremia that she had not too long ago. It seems that that is not the case. Radiologically speaking I do not see any local inflammation or reaction in any of these anatomical sites. There is perfect symmetry in the hip areas in regard to all her hardware material. Her sedimentation rate is BACK TO normal. Her white count is normal and this process will be watched in absence of any other intervention.  On 10/25/2022 no new episodes of septic arthritis. I measured her sedimentation rate during the previous visit that was down to 9, previously was in the 8-9 category. This means resolution of an inflammatory process altogether.   On 12/28/2022 at this time she is experiencing her typical symptom of osteoarthritis in her hands and hips but no issues pertinent to septic arthritis whatsoever. Deformities in the hands are ongoing due to osteoarthritis with no other issues or problems. No lymphedema in upper extremities.   On 03/29/2023 no significant sequela from her multiple foci and septic arthritis. What she has now is just typical osteoarthritis symptomatology and she uses Tylenol for this and occasional ibuprofen.   6/27/2023: Patient continues to report intermittent joint discomfort. Continues to manage pain with tylenol and occasional ibuprofen.   No issues pertinent to this.    On 12/28/2023 there is no evidence of recurrence of septic arthritis at this time.        6. Atrial Fibrillation  The patient developed atrial fibrillation with rapid ventricular response during the hospitalization with septic arthritis. Echocardiogram documented very dilated left atrium. She is now receiving Eliquis, metoprolol, and digoxin. Her ventricular response is controlled today but she remains in AFib. She has requested a followup with Dr. Odell and I went ahead and made her an  appointment. 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 If she had a fall and she is taking anticoagulants the only thing that we are going to have is just trouble. She has sold one of the horses. She will sell the other horse very soon and she will remain on land for the time being. Her  is back in town and he is helping her with consecution of groceries and things of this nature under the present circumstances. I advised her to minimize any trauma.  On 09/14/2022 the patient will be seen by Electrophysiology tomorrow in regard to consideration of cardioversion for her AFib. She has discussed this with Dr. Odell and I have reviewed this data. That is perfectly fine from my point of view. I find no form of contraindication from my side of the story if this is the methodology that is chosen to try to revert her to normal sinus rhythm.  On 10/25/2022 the patient is in normal sinus rhythm today. She remains on anticoagulants. She has cardiology appointment tomorrow. I asked her to buy a pulse oximeter and I taught her how to interpret the little wave curve of the pulse oximeter in regard to her heart rate. Typically patients in A-fib have very irregular pulse chaotic and the little wave curves will be continuously changing and besides the pulse rate will be continuously changing depending on the speed of the process. That is very simple to interpret. If se develops that problem I advised her to call her cardiologist.   On 12/28/2022 today she is in normal sinus rhythm by cardiac auscultation. Echocardiogram to be done by Danni Odell MD, in the next few days and further recommendations at that point. We strongly advised the patient about the continuation of Eliquis.   On 03/29/2023 clinically her heart obeys to normal sinus rhythm.   6/27/2023: Patient continues to be followed by Dr. Odell and has a stress test next week with   [facility]. Patient remains on Eliquis with good tolerance.   On 09/27/2023 the patient has been in A-fib since the time that she was seen by, Danni Odell MD, at the time that she had cardiac stress testing. Her A-fib is under control today with medications in regard to rate but she raised the question when, Manjeet Gustafson MD, is going to see her. I do not know that question but I will go ahead and request an appointment and send him a note.     On 12/28/2023 the patient is back into atrial fibrillation. Dr. Danni Odell, is controlling ventricular response and the patient is on Eliquis with no embolic phenomenon.       7. Grade 3 lymphedema in the left upper extremity    I went and made an urgent referral to the Lymphedema Clinic today to see if further bandage and drainage of this and massage would be helpful to her in the long run to control the problem. I still advised her to be extremely careful in regard to any injury or lesions in the skin of the left upper extremity to minimize any potentiality of cellulitis. In the long run if she has any episodes she will need to be back on antibiotics right away.   On 09/14/2022 the patient's lymphedema in the left upper extremity continues. She already has an appointment to be seen by the lymphedema clinic, she will require a new sleeve and massage and interventional therapy for this. She is aware that she needs to avoid any trauma in the left upper extremity to minimize any cellulitis. I strongly believe as posted before that septic arthritis is part of her lymphedema issues.   On 10/25/2022 her lymphedema in the left upper extremity is very much resolved with the sleeve and all of the manipulation that she has had in the lymphedema clinic. I advised her to be very prudent in regard doing any nicking in the skin and damaging the skin pertinent to the left upper extremity to minimize cellulitis and potentiality for further problems.   On 12/28/2022 no lymphedema in either  extremity. No need for sleeve use at this time.   On 03/29/2023 the patient has no leftover lymphedema in either extremity.  6/27/2023: Stable.   Lymphedema in the left upper extremity on 09/27/2023 is grade 1 and has not required any sleeve usage or elastic bandage at this time.     On 12/28/2023 her lymphedema in the left upper extremity is very minimal grade 1 at this point. No secondary.        8. Hypertension  In regard to hypertension management I have controlled this before. Now she is taking quite amount of metoprolol. I will give up the management of this and now that she will see Dr. Odell, they will provide the care of this issue. I would not be surprised if the patient in the long run needs to be receiving another blood pressure medication given the fact that her blood pressure is still marginally elevated today. Taking digoxin, I do not think Lasix or hydrochlorothiazide will be a good choice because of the potential for hypokalemia unless the potassium is replaced and monitored very close. This will probably minimize the potentiality for digoxin toxicity.  On 09/14/2022 her blood pressure is controlled with the metoprolol that is provided by the cardiology team. She was advised again to avoid antiinflammatory medication for pain control.   On 10/25/2022 her blood pressure is under good control and I advised her to continue taking her blood pressure medication and once more I advised against the use of any antiinflammatory medication by oral route.   On 12/28/2022 blood pressure under good control, medications will remain the same.  On 03/29/2023 her blood pressure remains under excellent control.   6/27/2023: Continues to be followed by Cardiology.   On 09/27/2023 her blood pressure is under good control.    On 12/28/2023 her blood pressure has been spiking including to a 190 systolic today in the office of, Danni Odell MD. Amlodipine was given. Her blood pressure today here is going down. The patient is  eating exaggerated amount of salt and on top of that she is taking antiinflammatory nonsteroidal medications at least 6 times a week, all of this in conjunction with raised blood pressure. I told her one more time she cannot take antiinflammatory medication, she can only take Tylenol. Dr. Danni Odell, will be adjusting her blood pressure medicine accordingly.       9. Insomnia  The problem is what to provide her for this problem at this time. There are cardiac issues. I do believe that this patient will be better off at least for the next 6 weeks taking a benzodiazapine or something of this nature more than any other medication. I do not feel compelled to give her gabapentin that actually has not worked for her in the past. Therefore I went ahead and prescribed for her medication in this regard today to take it at bedtime to see if this allows her to sleep properly.   On 10/25/2022 the patient persists having insomnia not sleeping more than 3 hours taking Ambien. I went ahead and discontinued this medicine. I gave her Seroquel 25 mg tablets to take 1 orally nightly. I asked her to call my nurse Priya Gonzalez next week and let us know how she is doing in that arena.   6/27/2023: Patient is no longer on Seroquel. Insomnia is mildly improved.     On 09/27/2023 the patient is no longer requiring any insomnia medication.    On 12/28/2023 not requiring any insomnia medication.       Plan:  On 12/28/2023 plan of care is clearly established on this patient as stated above. She will be called at home with the report of her tumor marker and will further review the CT to be done tomorrow. Otherwise I will review her back in 3 months. She is aware of not taking any antiinflammatory medications and take Tylenol for pain only.    I discussed this case with, Danni Odell MD, today.    Discussed with the patient's daughter who came from [place] for this visit.           Patient remains on high risk medication and requires close  monitoring for toxicity.

## 2023-12-28 NOTE — H&P (VIEW-ONLY)
Subjective     REASON FOR FOLLOW UP:  1. 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 R BREAST MASS WITH GIANT R AXILLARY ADENOPATHY.  SHE UNDERWENT DOSE DENSE AC, TAXOL, BILATERAL MASTECTOMIES, DRAMATIC NEAR COMPLETE RI, RADIATION THERAPY AND ADJUVANT FEMARA STOPPED ON 12/21 BECAUSE DRUG INDUCED HEPATITIS, SWITCHED TO AROMASIN 2/22: NO SIDE EFFECTS.    3. FAMILY HISTORY OF BREAST CANCER IN MOTHER AND SISTER, SISTER WAS TESTED FOR BRCA AND WAS NEGATIVE.    4. LEFT OVARIAN CYST , IT HAS BEEN PRESENT FOR LONG TIME BUT IS GETTING LARGER, ASYMPTOMATIC, NEED FOR , PELVIC US AND GYN ONC EVal: no need for any intervention                  5. LEFT HIP PAIN SEVERE ARTHRITIS, REAL NEED FOR LEFT HIP REPLACEMENT: PERFORMED 8/21    6. DRUG INDUCED HEPATITIS,  FINALLY STOPPED FEMARA: DRAMATIC IMPROVEMENT AND RESOLUTION.      History of present illness:    On 12/28/2023 this 63-year-old female returns to the office in company of her brother in order to be reviewed. She has been in the office of, Danni Odell MD, Cardiology this morning and Dr. Odell called me concerned that the patient was having occasional intermittent pleuritic pain in the chest wall anteriorly on the right side in Absence of any pain in the rib cage per se. She has not had any cough, sputum production, hemoptysis or shortness of breath. Her appetite has remained excellent, she has gained weight and she was eating exaggerated amount of salt to the point that her blood pressure is on the rise very dramatically. The patient is back into atrial fibrillation.     She has history of bilateral breast cancers. She was treated according to protocol above, she achieved a complete remission and she remains on Aromasin since Femara triggered very significant drug induced hepatitis. Aromasin is taken on a daily basis 25 mg a day with no side effects. The patient has proper bowel function, proper urination. She admits that she is taking  ibuprofen at least 4-6 tablets a week. This also could be raising her blood pressure.    She is not drinking any alcohol at this time. Besides this no other issues besides her chronic arthritic pain in hips, elbows, hands from time to time. She is functional and capable of taking care of herself.           Past Medical History:   Diagnosis Date    Anemia in neoplastic disease     Arthritis     Arthritis of back     Arthritis of neck     Breast cancer     Left    CVA (cerebral vascular accident)     Encounter for long-term (current) use of other medications 06/22/2021    Fracture, fibula     Fracture, finger     Frozen shoulder     Hip arthrosis 01/17    Hip pain     RIGHT HIP... CYST    History of fracture of leg 1987    History of radiation therapy     LAST TREATMENT      Hypertension     Knee swelling     Limited joint range of motion (ROM)     RIGHT HIP    Low back strain     Periarthritis of shoulder     Rotator cuff syndrome 6/22    Skin sore     OPEN SORE LEFT BREAST    Syncope     Vertigo            Past Surgical History:   Procedure Laterality Date    AXILLARY LYMPH NODE BIOPSY/EXCISION Right     LYMPH NODE UNDER RIGHT ARM-MALIGNANT (DOUBLE MASTECTOMY)    BREAST BIOPSY Left     MALIGNANT    FRACTURE SURGERY  1987    Leg    HARDWARE REMOVAL      INCISION AND DRAINAGE LEG Left 06/30/2022    Procedure: INCISION AND DRAINAGE left ankle;  Surgeon: Kenneth Tran MD;  Location: [facility];  Service: Orthopedics;  Laterality: Left;    JOINT REPLACEMENT Bilateral mar 2020,july 2021    hips    MASTECTOMY W/ SENTINEL NODE BIOPSY Bilateral 09/16/2019    Procedure: BILATERLA MODIFIED RADICAL MASTECTOMY WITH BILATERAL SENTINEL LYMPH NODE BIOPSY;  Surgeon: Joby Barron Jr., MD;  Location: [facility];  Service: General    TOTAL HIP ARTHROPLASTY Right 03/02/2020    Procedure: RIGHT TOTAL HIP ARTHROPLASTY NATALY NAVIGATION;  Surgeon: Luis M Leonard MD;  Location: [facility] OR;  Service: Orthopedics;   Laterality: Right;    TOTAL HIP ARTHROPLASTY Left 07/20/2021    Procedure: Posterior LEFT TOTAL HIP ARTHROPLASTY NATALY NAVIGATION;  Surgeon: Luis M Leonard MD;  Location: [facility] OR;  Service: Orthopedics;  Laterality: Left;    VENOUS ACCESS DEVICE (PORT) INSERTION Right 02/01/2019    Procedure: INSERTION VENOUS ACCESS DEVICE;  Surgeon: Joby Barron Jr., MD;  Location: [facility];  Service: General    VENOUS ACCESS DEVICE (PORT) REMOVAL N/A 10/30/2019    Procedure: Mediport Removal;  Surgeon: Joby Barron Jr., MD;  Location: [facility];  Service: General        Current Outpatient Medications on File Prior to Visit   Medication Sig Dispense Refill    acetaminophen (TYLENOL) 325 MG tablet Take 2 tablets by mouth Every 6 (Six) Hours As Needed.      apixaban (ELIQUIS) 5 MG tablet tablet Take 1 tablet by mouth Every 12 (Twelve) Hours. Indications: Atrial Fibrillation 60 tablet 5    Cholecalciferol 250 MCG (90714 UT) tablet Take 1 tablet by mouth. As directed      doxylamine (UNISOM) 25 MG tablet Take 1 tablet by mouth At Night As Needed for Sleep.      exemestane (AROMASIN) 25 MG tablet TAKE 1 TABLET BY MOUTH DAILY 30 tablet 3    polyethylene glycol 17 g packet Take 17 g by mouth 2 (Two) Times a Day As Needed (constipation).      tiZANidine (ZANAFLEX) 4 MG tablet Take 1 tablet by mouth Every 6 (Six) Hours As Needed for Muscle Spasms.      [DISCONTINUED] digoxin (LANOXIN) 125 MCG tablet Take 1 tablet by mouth Daily.      [DISCONTINUED] metoprolol succinate XL (TOPROL-XL) 50 MG 24 hr tablet TAKE ONE TABLET BY MOUTH TWICE A DAY 60 tablet 5     Current Facility-Administered Medications on File Prior to Visit   Medication Dose Route Frequency Provider Last Rate Last Admin    Chlorhexidine Gluconate Cloth 2 % pads 1 each  1 each Apply externally Take As Directed Luis M Leonard MD            ALLERGIES:    Allergies   Allergen Reactions    Benadryl [Diphenhydramine] Itching     INCREASES BLOOD PRESSURE     "Erythromycin GI Intolerance    Levaquin [Levofloxacin] GI Intolerance    Penicillins GI Intolerance        Social History     Socioeconomic History    Marital status:      Spouse name: Cruz    Number of children: 0   Tobacco Use    Smoking status: Never    Smokeless tobacco: Never   Vaping Use    Vaping Use: Never used   Substance and Sexual Activity    Alcohol use: Yes     Comment: occasional    Drug use: No    Sexual activity: Not Currently     Partners: Male     Birth control/protection: Abstinence        Family History   Problem Relation Age of Onset    Lung cancer Father     Irritable bowel syndrome Father     Cancer Mother     Breast cancer Mother     Liver disease Mother     Breast cancer Sister 48    Breast cancer Maternal Grandmother     Breast cancer Paternal Grandmother     Breast cancer Maternal Uncle     Malig Hyperthermia Neg Hx             Objective     Vitals:    12/28/23 1051   BP: Comment: 160/130   Pulse: 86   Resp: 18   Temp: 97.8 °F (36.6 °C)   TempSrc: Temporal   SpO2: 100%   Weight: 79.8 kg (176 lb)   Height: 167.6 cm (65.98\")   PainSc: 0-No pain           12/28/2023    10:46 AM   Current Status   ECOG score 0     Exam:               GENERAL:  Well-developed, Patient  in no acute distress.   SKIN:  Warm, dry ,NO purpura ,no rash.  HEENT:  Pupils were equal and reactive to light and accomodation, conjunctivae noninjected,  normal visual acuity.   NECK:  Supple with good range of motion; no thyromegaly , no JVD or bruits,.No carotid artery pain, no carotid abnormal pulsation   LYMPHATICS:  No cervical, NO supraclavicular, NO axillary, NO inguinal adenopathies.  CARDIAC   normal rate , regular rhythm, without murmur,NO rubs NO S3 NO S4   LUNGS: normal breath sounds bilateral, no wheezing, NO rhonchi, NO crackles ,NO rubs.  VASCULAR VENOUS: no cyanosis, NO collateral circulation, NO varicosities, NO edema, NO palpable cords, NO pain,NO erythema, NO pigmentation of the skin.  ABDOMEN:  " Soft, NO pain,no hepatomegaly, no splenomegaly,no masses, no ascites, no collateral circulation,no distention.  EXTREMITIES  AND SPINE:  No clubbing, no cyanosis ,no deformities , no pain .No kyphosis,  no pain in spine, no pain in ribs , no pain in pelvic bone.  NEUROLOGICAL:  Patient was awake, alert, oriented to time, person and place.  Chest wall shows bilateral mastectomies with telangectasia's at the surgical site related to radiation therapy. No masses, nodularity or tenderness. A careful palpation of the sternum, rib cage, cartilages in the rib cage disclose no areas of tenderness or abnormality whatsoever.         RECENT LABS:  Hematology Results from last 7 days   Lab Units 12/28/23  1035   WBC 10*3/mm3 4.85   NEUTROS ABS 10*3/mm3 3.30   HEMOGLOBIN g/dL 14.5   HEMATOCRIT % 44.7   PLATELETS 10*3/mm3 231     Results from last 7 days   Lab Units 12/28/23  1035   SODIUM mmol/L 140   POTASSIUM mmol/L 4.6   CHLORIDE mmol/L 103   CO2 mmol/L 27.0   BUN mg/dL 13   CREATININE mg/dL 0.94   CALCIUM mg/dL 9.9   ALBUMIN g/dL 4.7   BILIRUBIN mg/dL 0.5   ALK PHOS U/L 107   ALT (SGPT) U/L 10   AST (SGOT) U/L 24   GLUCOSE mg/dL 106*                  Assessment & Plan    1.  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   For at least 4 years, she has had an in crescendo mass in the left breast that has produced a gigantic tumor that was close to 25 cm in size and is replacing most of the anatomy of the left breast. There are areas of ulceration necrosis and bleeding and the mass is fixed to the chest wall. She has abundant amount of collateral circulation in the left anterior chest wall and it caught my attention the lack of any left axillary adenopathy. She has no lymphedema in the left upper extremity. In the right breast while in sitting position, no palpable abnormalities are documented. The right breast skin and nipple are  normal. When the patient lies flat, there is a palpable mass at 9 o'clock from the nipple that measures probably 4 cm in size, very indistinct in regard to edges and margins. There was no mobility and no areas of tenderness. She has a giant adenopathy in the right axilla that measures close to 9 cm in size, uniform, no fluctuation, nontender, completely fixed to the axillary wall. There is no compromise of the skin.   Patient completed 4 cycles of neoadjuvant AC on 4/12/2019.    Patient completed neoadjuvant weekly Taxol x 12 treatments on 7/19/2019.  9/16/2019 bilateral mastectomy by Dr. Joby Barron with axillary lymph node dissection.  No residual malignancy.  10/30/2019 patient's Mediport was removed in anticipation of radiation.  Radiation therapy under the care of Dr. Marmolejo 10/31/2019-1/13/2020 to the right chest wall.  Started on adjuvant Femara 2.5 mg daily 8/20/2019.  9/21/2020 continues on adjuvant Femara, tolerating it quite well.  Some mild hot flashes and mild arthralgias, all which are tolerable.  The patient returned to the office on 05/24/2021. Since the previous visit the patient proceeded with a PET scan and I have reviewed this with her in the PAC system showing chest wall on the left side area of enlightening SUV activity that is almost 3 cm long x 7 mm wide. It is behind the bone. It is very close to the lower aspect of her heart. The reset of the PET scan discloses no activity. This is also specific in regard to the ovaries and actually an ultrasound of her vagina looking into the ovaries documented an unilocular cyst on the left side with typical features of benignity and a  was completely normal 5.5.   The patient was further reviewed on 07/07/2021 after she has continued her Kisqali and completion of the 1st round of the medicine. Today her EKG disclosed a normal QT interval and this has been sent to Cardiology to be reported officially. She has not developed any rash but she has  leukopenia induced by Kisqali. She has completed her radiation therapy to the epicardial right lymph node with no difficulties or side effects.   The patient was further reviewed on 09/30/2021. Patient has recovered well from her left hip replacement. She completed her Kisqali a week ago. Her white count is low today. The hemoglobin is stable. The platelet count is stable. Elevation in her liver function test, SGOT, SGPT noted. The patient is not drinking any alcohol. She is not taking any cholesterol medicine. She is not taking any anti-inflammatory medication and leads me to think that Kisqali is the culprit of this. Given these findings we advised the patient to put the Kisqali on hold until we recheck chemistry profile on weekly basis for the next 3 or 4 weeks. We need to settle these numbers down before she continues the Kisqali. She probably will require dose adjustment at some point. Again, her hepatitis A, B and C serologies are negative.   n regard to her history of breast cancer she was reviewed on 11/19/2021. Since the previous visit the patient has had tumor marker CA 15-3 that has dropped to 20. Radiological assessment of her chest and abdomen CT scan that documents resolution of the epicardial lymphadenopathy in the right hemithorax, no pulmonary nodules or metastasis, no pleural effusion and no liver metastasis. No bone metastasis. Based on this information I do believe that the breast cancer is relatively quiet clinically, biochemically and radiologically and I advised her today to resume her Femara at the dose of 2.5 mg a day. The likelihood of Femara producing liver dysfunction is more than remote.   The patient was further reviewed on 12/29/2021. Recent review of her liver function tests a few days ago documented persistent elevation of her SGOT, SGPT, and alkaline phosphatase. She has discontinued most of the medicines and I have no other choice but discontinue the Femara. Since then and actually  today is the 1st day in many weeks that the SGOT and SGPT and alkaline phosphatase are improving. The patient actually remains asymptomatic in this regard. Her liver is not enlarged. She has no jaundice. She has no rash, no skin lesions and no other new alterations. That is good news. Her white blood count, hemoglobin and platelets remain stable. 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 That is extremely rare. As I posted above, I discussed with all my partners in regard to how many times through their careers prescribing Femara to multiple breast cancers they have seen Femara triggering hepatitis, none of them gave me a positive answer. I reviewed this information in [employer] and it is reported in less than 1% of patients taking this medication. I think we are in front of a rare situation but I want for her to withhold any further Femara treatment for the time being and I want to review her back in 3 weeks. If the liver function tests continue improving or normalize by then maybe we will have the answer once and for all. Raises the question what we will do next and I think the next medicine will be the utilization of Aromasin that has a different chemical component and different methodology for action. I made her aware of that. We are not going to go back in giving her Kisqali under the present circumstances given the confusion and all the issues pertinent to her liver dysfunction.   The patient was further reviewed on 03/07/2022. Since the previous visit the patient has stopped the Femara in 12/2021 and today her liver function tests are completely back to normality. This proves the point that Femara was the culprit phenomenon that I never have seen and discussed with my partner, Danelle Cespedes MD. He never has seen this neither.   The patient returned on 05/09/2022  with no symptoms of anything in particular besides minimal respiratory allergies and pain in the left hip associated with her previous surgery. Her clinical examination today is very much unremarkable, she looks fantastic. She has no symptoms or signs of breast cancer recurrence. White count, hemoglobin and platelets are normal. Her tumor marker CA 15-3 has remained normal and actually lower than ever and compliance with Aromasin has been 100% with excellent tolerance.   Given the circumstances of this I advised her to remain on Aromasin 25 mg a day and I find no use for this patient to go back onto Kisqali or medicines of this nature.   On 08/16/2022 we reviewed the patient in regard to her history of breast cancer. Clinically she has not had any obvious recurrent disease in the chest wall in the lung anatomy or abdomen or pelvis. She remains on Aromasin adjuvantly and we are waiting to review tumor marker. She has a chest wall that is completely flat due to previous mastectomies and radiation therapy. There is no axillary adenopathies and the patient has in my opinion control of her disease process as far as we can tell. I advised her to remain on Aromasin 25 mg a day. I went ahead and scheduled a visit with us in a few weeks in order to further review radiological analysis that will be discussed below.  On 09/14/2022 the patient has had in the interim a CT scan of the chest, abdomen and pelvis for review personally. Her pulmonary anatomy remains normal, there is no pulmonary congestion, pleural effusions, pericardial effusion, cardiomegaly, hilar or mediastinal adenopathy. There is prepericardiac lymph node measuring almost 1.5 to 2 cm in size that is flat like a small finger that has been present before and has minimally enlarged. If this has anything to do with her previous history of breast cancer is difficult to state but this has been evaluated before with similarity in regards size and some degree of  fluctuation. I think this does not constrain me from thinking with a normal tumor marker of CA 15-3 of 20 during the previous visit or compel to perform either removal or biopsy at this time or proceed with radiological assessment with a PET scan. I do believe that this is not representation of anything in particular. Her liver anatomy and the rest of the bony anatomy, pancreas, kidneys, spleen and retroperitoneum remain unchanged. Given this finding I advised the patient to proceed and continue her Aromasin without the need to add any other medication to her regimen of medication especially in the background of A-fib. I advised her to proceed with reassessment with me in 6 weeks with a repeat CBC, CMP and CA 15-3. In that moment also we will obtain a liquid biopsy for further analysis. Her liver function test has remained normal and Aromasin has not produced any chemical hepatitis. I advised her again to remain on this medicine by itself.   On 10/25/2022 the patient remains on Aromasin. She has no symptoms or signs that would indicate breast cancer recurrence and the latest tumor marker CA 15-3 was 20 two months ago. We have another one pending today. Given these circumstances I advised her to remain on Aromasin for the time being. I find no need for radiological assessment at this time. She will be called at home with the report of her tumor marker.   On 12/28/2022 the patient had no symptoms or signs of breast cancer progression or recurrence on any level. Tolerance to Aromasin is excellent with no side effects and she has 100% compliance on the medication. She was advised to remain on the medication. Her tumor marker CA 15-3 has remained normal. No plans for radiological assessment unless new clinical changes occur or tumor marker changes.   On 03/29/2023 the patient has no symptoms or signs of breast cancer recurrence. She continues taking her aromatase inhibitor medicine on a routine basis with 100% compliance  and no significant side effects from the medication. Clinically she has no evidence of breast cancer recurrence and during the previous visit her CA 15-3 remained normal, another one is pending today that will be discussed with her on the telephone once it becomes available. Given the stability of her clinical picture and the excellent clinical status I advised the patient to remain on her medicine for the time being returning to see us back every 3 months with a CBC, CMP and CA 15-3. I find no need for radiological assessment on her at this point.   6/27/2023: Patient reviewed back today in office. She does not have any clinical findings suggestive of malignancy reoccurrence. She remains on Aromasin daily. Labs reviewed. CBC and CMP are both normal. CA 15-3 is normal at 18.5. Patient encouraged to become more active to help with weight gain and arthritis.   On 09/27/2023 the patient was further reviewed. She has no symptoms or signs of breast cancer recurrence. Her chest wall is unremarkable, the lymphedema in the left upper extremity is a grade 1 at the most controlled and she has no need for any elastic support. She has not developed any cellulitis or infection in the left upper extremity. The patient is taking Aromasin adjuvantly 100% compliance, no evidence of recurrent disease. Normal white count, hemoglobin and platelets pending chemistry profile. We advised her to remain on Aromasin 25 mg a day. She will be returning to see us back in 3 months with repeat CBC, CMP and CA 15-3. No need for radiological assessment on her at this point.    On 12/28/2023 no symptoms or signs of breast cancer recurrence. My conversation with, Danni Odell MD, today took place in regard to the potentiality for pleuritic pain on the right side. Lung auscultation is normal and the patient has no symptoms. This pain comes and goes very sporadically and is not bothering her on routine basis. Knowing that she has had a minimal pericardial  alteration before we opted to proceed with a CT angiogram of the chest that will be done tomorrow and we will review this with the patient at some point on the telephone in the next 48 hours or so.     If we assume that her cancer marker CA 15-3 remains stable the patient will remain on Aromasin and we will review her radiological analysis again. Otherwise plan to review her back in 3 months with repeat CBC, CMP and CA 15-3.       2. Left hip pain.   05/17/2021: Patient reports new complaint of progressive discomfort and the inability to function given the pain in the left hip area. Now she is limping. The only time when she is not in discomfort with the left hip is when she is sitting or lying in bed or riding the horse when gravity takes away the pressure of her left hip area. Radiologically speaking the CT scan of the chest, abdomen and pelvis to my eyes disclosed no abnormalities besides a very simple ovarian cyst that measures close to 7 x 5 cm with no trabeculation. There is no pelvic ascites. There is no pelvic adenopathy. The other abnormality obviously visible is the severe degree of scoliosis of the thoracic, lumbar spines.   It caught my attention the subchondral cyst in the left hip and the minimal if any space leftover between the head of the femur and the acetabulum. There is no metastasis in this anatomical site.   The radiologist mentioned cardiophrenic angle lymphadenopathy. I cannot see that from my naked eyes. I do not know what it is and I do not know how to express it. Pulmonary anatomy is normal. Hilar adenopathy is normal. No pericardial effusion. No pleural effusion. Minimal infiltrate in the lungs related to radiation changes. Liver anatomy, pancreas and kidneys were unremarkable. The scoliosis is very obvious in the view of the abdomen.   Patient refereed with Dr. Leonard, orthopedic surgeon.   Patient underwent left hip replacement and recovered well.   On 09/27/2023 her left hip pain is very  minimal at this time, she is not limping. 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 Her osteoarthritis in her hands is still a prevalent issue with aches and pains periodically. No need for pain medicine at this time besides Tylenol.     On 12/28/2023 she has arthritic pain in multiple sites in her back, in her hips, in her knees, in her hands. She takes now as per my advice Tylenol and I told her absolutely no antiinflammatory medications whatsoever.       3. Ovarian cyst  05/17/2021: Her CA 15-3 was minimal elevated in comparison to previous assessment and pelvic ultrasound ordered. t raises the following question.   05/24/2021: This is also specific in regard to the ovaries and actually an ultrasound of her vagina looking into the ovaries documented an unilocular cyst on the left side with typical features of benignity and a  was completely normal 5.5.   6/27/2023: CA 15-3 is normal at 18.5.  On 09/27/2023 no issues pertinent to this. This will remain in observation.    On 12/28/2023 this is asymptomatic. No issues pertinent to this at this time.       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   Her liver enzymes are completely normal today. She remains on Aromasin and obviously this patient never will be back on letrozole under any circumstances. This was documented through liver biopsy and through removal of the medication that triggered quick improvement in her liver function test.   On 09/14/2022 there is no evidence of drug induced hepatitis. Aromasin will be continued. The patient is aware that she never will be able to take Femara.   On 10/25/2022 her liver enzymes are completely normal today. Since discontinuation of Femara her drug induced hepatitis has resolved. This situation was extremely rare.   ON 12/28/2022 no issues pertinent to liver function after discontinuation of Femara months ago.   On 03/29/2023 waiting to  review her liver function. All of the abnormalities resolved after stopping letrozole.   6/27/2023: Liver enzymes remain normal.   No issues pertinent to this. She remains is observation.    On 12/28/2023 her liver function test is completely normal today. Aromasin is not producing any issues.      5. Septic Arthritis  The patient has developed an episode of septic arthritis in many joints including left shoulder and left ankle. She required antibiotic therapy as per recently. Infectious Disease was involved in this. In fact I discussed the case with them on the telephone. I suggested choosing the PICC line to be placed on the right arm in order to be able to deliver antibiotic therapy according to the proper indication. She completed the PICC line and it was removed last week with no complications or problems. The right upper extremity is completely normal. It is difficult for me to know why she developed the septic arthritis. She is in contact with horses all the time. The pathogen that she had in the joint is very uncommon in my opinion and the patient had no obvious source including no sinuses, no dental source, no obvious cardiac source, no gastrointestinal or genitourinary source and no skin source. It raises the question if this pathogen could evade to colonic source and I think I feel the obligation for this patient to have at least a CT scan of the abdomen and pelvis and eventually in several months from now consider a colonoscopy. Another consideration could be a Cologuard in some way. At least the patient’s infection seems to be under control. Her joints are still sore today including left shoulder, left ankle. Local inflammatory signs are very much gone. The only area of inflammation that she has in the 1st MP joint on the left hand probably represents osteoarthritis, no more than that. She will remain in observation from this point of view.  On 09/14/2022 the patient has no symptoms that would suggest  any further spreading or new development of septic arthritis. My fear was related to the possible seeding of her hip replacement areas by bacteria during the bacteremia that she had not too long ago. It seems that that is not the case. Radiologically speaking I do not see any local inflammation or reaction in any of these anatomical sites. There is perfect symmetry in the hip areas in regard to all her hardware material. Her sedimentation rate is BACK TO normal. Her white count is normal and this process will be watched in absence of any other intervention.  On 10/25/2022 no new episodes of septic arthritis. I measured her sedimentation rate during the previous visit that was down to 9, previously was in the 8-9 category. This means resolution of an inflammatory process altogether.   On 12/28/2022 at this time she is experiencing her typical symptom of osteoarthritis in her hands and hips but no issues pertinent to septic arthritis whatsoever. Deformities in the hands are ongoing due to osteoarthritis with no other issues or problems. No lymphedema in upper extremities.   On 03/29/2023 no significant sequela from her multiple foci and septic arthritis. What she has now is just typical osteoarthritis symptomatology and she uses Tylenol for this and occasional ibuprofen.   6/27/2023: Patient continues to report intermittent joint discomfort. Continues to manage pain with tylenol and occasional ibuprofen.   No issues pertinent to this.    On 12/28/2023 there is no evidence of recurrence of septic arthritis at this time.        6. Atrial Fibrillation  The patient developed atrial fibrillation with rapid ventricular response during the hospitalization with septic arthritis. Echocardiogram documented very dilated left atrium. She is now receiving Eliquis, metoprolol, and digoxin. Her ventricular response is controlled today but she remains in AFib. She has requested a followup with Dr. Odell and I went ahead and made her an  appointment. 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 If she had a fall and she is taking anticoagulants the only thing that we are going to have is just trouble. She has sold one of the horses. She will sell the other horse very soon and she will remain on land for the time being. Her  is back in town and he is helping her with consecution of groceries and things of this nature under the present circumstances. I advised her to minimize any trauma.  On 09/14/2022 the patient will be seen by Electrophysiology tomorrow in regard to consideration of cardioversion for her AFib. She has discussed this with Dr. Odell and I have reviewed this data. That is perfectly fine from my point of view. I find no form of contraindication from my side of the story if this is the methodology that is chosen to try to revert her to normal sinus rhythm.  On 10/25/2022 the patient is in normal sinus rhythm today. She remains on anticoagulants. She has cardiology appointment tomorrow. I asked her to buy a pulse oximeter and I taught her how to interpret the little wave curve of the pulse oximeter in regard to her heart rate. Typically patients in A-fib have very irregular pulse chaotic and the little wave curves will be continuously changing and besides the pulse rate will be continuously changing depending on the speed of the process. That is very simple to interpret. If se develops that problem I advised her to call her cardiologist.   On 12/28/2022 today she is in normal sinus rhythm by cardiac auscultation. Echocardiogram to be done by Danni Odell MD, in the next few days and further recommendations at that point. We strongly advised the patient about the continuation of Eliquis.   On 03/29/2023 clinically her heart obeys to normal sinus rhythm.   6/27/2023: Patient continues to be followed by Dr. Odell and has a stress test next week with   Rex. Patient remains on Eliquis with good tolerance.   On 09/27/2023 the patient has been in A-fib since the time that she was seen by, Danni Odell MD, at the time that she had cardiac stress testing. Her A-fib is under control today with medications in regard to rate but she raised the question when, Manjeet Gustafson MD, is going to see her. I do not know that question but I will go ahead and request an appointment and send him a note.     On 12/28/2023 the patient is back into atrial fibrillation. Dr. Danni Odell, is controlling ventricular response and the patient is on Eliquis with no embolic phenomenon.       7. Grade 3 lymphedema in the left upper extremity    I went and made an urgent referral to the Lymphedema Clinic today to see if further bandage and drainage of this and massage would be helpful to her in the long run to control the problem. I still advised her to be extremely careful in regard to any injury or lesions in the skin of the left upper extremity to minimize any potentiality of cellulitis. In the long run if she has any episodes she will need to be back on antibiotics right away.   On 09/14/2022 the patient's lymphedema in the left upper extremity continues. She already has an appointment to be seen by the lymphedema clinic, she will require a new sleeve and massage and interventional therapy for this. She is aware that she needs to avoid any trauma in the left upper extremity to minimize any cellulitis. I strongly believe as posted before that septic arthritis is part of her lymphedema issues.   On 10/25/2022 her lymphedema in the left upper extremity is very much resolved with the sleeve and all of the manipulation that she has had in the lymphedema clinic. I advised her to be very prudent in regard doing any nicking in the skin and damaging the skin pertinent to the left upper extremity to minimize cellulitis and potentiality for further problems.   On 12/28/2022 no lymphedema in either  extremity. No need for sleeve use at this time.   On 03/29/2023 the patient has no leftover lymphedema in either extremity.  6/27/2023: Stable.   Lymphedema in the left upper extremity on 09/27/2023 is grade 1 and has not required any sleeve usage or elastic bandage at this time.     On 12/28/2023 her lymphedema in the left upper extremity is very minimal grade 1 at this point. No secondary.        8. Hypertension  In regard to hypertension management I have controlled this before. Now she is taking quite amount of metoprolol. I will give up the management of this and now that she will see Dr. Odell, they will provide the care of this issue. I would not be surprised if the patient in the long run needs to be receiving another blood pressure medication given the fact that her blood pressure is still marginally elevated today. Taking digoxin, I do not think Lasix or hydrochlorothiazide will be a good choice because of the potential for hypokalemia unless the potassium is replaced and monitored very close. This will probably minimize the potentiality for digoxin toxicity.  On 09/14/2022 her blood pressure is controlled with the metoprolol that is provided by the cardiology team. She was advised again to avoid antiinflammatory medication for pain control.   On 10/25/2022 her blood pressure is under good control and I advised her to continue taking her blood pressure medication and once more I advised against the use of any antiinflammatory medication by oral route.   On 12/28/2022 blood pressure under good control, medications will remain the same.  On 03/29/2023 her blood pressure remains under excellent control.   6/27/2023: Continues to be followed by Cardiology.   On 09/27/2023 her blood pressure is under good control.    On 12/28/2023 her blood pressure has been spiking including to a 190 systolic today in the office of, Danni Odell MD. Amlodipine was given. Her blood pressure today here is going down. The patient is  eating exaggerated amount of salt and on top of that she is taking antiinflammatory nonsteroidal medications at least 6 times a week, all of this in conjunction with raised blood pressure. I told her one more time she cannot take antiinflammatory medication, she can only take Tylenol. Dr. Danni Odell, will be adjusting her blood pressure medicine accordingly.       9. Insomnia  The problem is what to provide her for this problem at this time. There are cardiac issues. I do believe that this patient will be better off at least for the next 6 weeks taking a benzodiazapine or something of this nature more than any other medication. I do not feel compelled to give her gabapentin that actually has not worked for her in the past. Therefore I went ahead and prescribed for her medication in this regard today to take it at bedtime to see if this allows her to sleep properly.   On 10/25/2022 the patient persists having insomnia not sleeping more than 3 hours taking Ambien. I went ahead and discontinued this medicine. I gave her Seroquel 25 mg tablets to take 1 orally nightly. I asked her to call my nurse Priya Gonzalez next week and let us know how she is doing in that arena.   6/27/2023: Patient is no longer on Seroquel. Insomnia is mildly improved.     On 09/27/2023 the patient is no longer requiring any insomnia medication.    On 12/28/2023 not requiring any insomnia medication.       Plan:  On 12/28/2023 plan of care is clearly established on this patient as stated above. She will be called at home with the report of her tumor marker and will further review the CT to be done tomorrow. Otherwise I will review her back in 3 months. She is aware of not taking any antiinflammatory medications and take Tylenol for pain only.    I discussed this case with, Danni Odell MD, today.    Discussed with the patient's daughter who came from [place] for this visit.           Patient remains on high risk medication and requires close  monitoring for toxicity.

## 2023-12-29 ENCOUNTER — HOSPITAL ENCOUNTER (OUTPATIENT)
Dept: CT IMAGING | Facility: HOSPITAL | Age: 63
Discharge: HOME | End: 2023-12-29
Admitting: INTERNAL MEDICINE
Payer: MEDICARE

## 2023-12-29 ENCOUNTER — TELEPHONE (OUTPATIENT)
Age: 63
End: 2023-12-29
Payer: MEDICARE

## 2023-12-29 DIAGNOSIS — R07.2 PRECORDIAL PAIN: ICD-10-CM

## 2023-12-29 DIAGNOSIS — I51.89 CARDIAC MASS: ICD-10-CM

## 2023-12-29 PROCEDURE — 25510000001 IOPAMIDOL PER 1 ML: Performed by: INTERNAL MEDICINE

## 2023-12-29 PROCEDURE — 71275 CT ANGIOGRAPHY CHEST: CPT

## 2023-12-29 RX ADMIN — IOPAMIDOL 95 ML: 755 INJECTION, SOLUTION INTRAVENOUS at 12:36

## 2023-12-29 NOTE — TELEPHONE ENCOUNTER
I let her know that CT scan did not show evidence of blood clot or aortic aneurysm.  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  Would discuss this with Dr. Dixon, her oncologist, whose note from yesterday said he would review CT scan today.  I will CC him in this note as well. She picked up amlodipine today from the pharmacy.

## 2023-12-29 NOTE — NURSING NOTE
1231 Patient arrived in radiology triage  for STAT hold and call.     1335 Call from Pamela with Dr. Odell. Patient is negative for PE and aneurysm. Patient may go home. Dr. Odell will call patient regarding results.     1345 Patient ambulated independently to entrance A.

## 2024-01-02 ENCOUNTER — TELEPHONE (OUTPATIENT)
Dept: CARDIOLOGY | Facility: CLINIC | Age: 64
End: 2024-01-02
Payer: MEDICARE

## 2024-01-02 NOTE — TELEPHONE ENCOUNTER
She needs to have her BP cuff checked when she comes in for echo tomorrow. Would also use amlodipine 2.5 mg daily, not just PRN.

## 2024-01-02 NOTE — TELEPHONE ENCOUNTER
Patient is calling to report her BP's.  Pt saw MKD 12/28.  She is on metoprolol  mg in AM and 50 mg in PM.  She was given a script at last appt for amlodipine 2.5 mg and was told to take PRN for SBP >150.  She only had to take that once time on 12/29 for the value of 161/123.      Date Time BP  HR   28-Dec /105 96    /78 78   29-Dec /78 62   CT scan this day was nauseous /123    30-Dec /103 82    /102 76   31-Dec /86 84    /119 58   1-Jan /119 58   2-Jan /95 58       She has an echo tomorrow.  Her BP cuff is from her dad.  She does not think it has even been checked.  Her BP in the office on 12/28 is documented at 180/118.  She has reduced her sodium intake and is cutting down on her portions to maintain a healthy weight.    Recs?    Yanet Edouard RN  [facility] Triage  01/02/24 10:16 EST

## 2024-01-02 NOTE — TELEPHONE ENCOUNTER
Called and spoke with patient.  I have added her on for BP check tomorrow after echo, she will bring her home cuff. She is agreeable to take amlodipine 2.5 mg daily.  She will continue to log her BPs.    Yanet Edouard RN  [facility] Triage  01/02/24 10:49 EST

## 2024-01-03 ENCOUNTER — CLINICAL SUPPORT (OUTPATIENT)
Dept: CARDIOLOGY | Facility: CLINIC | Age: 64
End: 2024-01-03
Payer: MEDICARE

## 2024-01-03 ENCOUNTER — HOSPITAL ENCOUNTER (OUTPATIENT)
Dept: CARDIOLOGY | Facility: HOSPITAL | Age: 64
Discharge: HOME | End: 2024-01-03
Admitting: INTERNAL MEDICINE
Payer: MEDICARE

## 2024-01-03 ENCOUNTER — TELEPHONE (OUTPATIENT)
Dept: ONCOLOGY | Facility: CLINIC | Age: 64
End: 2024-01-03
Payer: MEDICARE

## 2024-01-03 VITALS
DIASTOLIC BLOOD PRESSURE: 89 MMHG | WEIGHT: 176 LBS | BODY MASS INDEX: 29.32 KG/M2 | SYSTOLIC BLOOD PRESSURE: 118 MMHG | OXYGEN SATURATION: 98 % | HEART RATE: 83 BPM | HEIGHT: 65 IN

## 2024-01-03 DIAGNOSIS — I31.8 PERICARDIAL MASS: Primary | ICD-10-CM

## 2024-01-03 DIAGNOSIS — I51.89 CARDIAC MASS: ICD-10-CM

## 2024-01-03 LAB
AORTIC ARCH: 3.5 CM
AORTIC DIMENSIONLESS INDEX: 0.8 (DI)
ASCENDING AORTA: 3.7 CM
BH CV ECHO MEAS - ACS: 1.85 CM
BH CV ECHO MEAS - AO MAX PG: 4.1 MMHG
BH CV ECHO MEAS - AO MEAN PG: 2.44 MMHG
BH CV ECHO MEAS - AO ROOT DIAM: 2.8 CM
BH CV ECHO MEAS - AO V2 MAX: 101.3 CM/SEC
BH CV ECHO MEAS - AO V2 VTI: 19.4 CM
BH CV ECHO MEAS - AVA(I,D): 1.78 CM2
BH CV ECHO MEAS - EDV(CUBED): 87.7 ML
BH CV ECHO MEAS - EDV(MOD-SP2): 82 ML
BH CV ECHO MEAS - EDV(MOD-SP4): 107 ML
BH CV ECHO MEAS - EF(MOD-BP): 54.4 %
BH CV ECHO MEAS - EF(MOD-SP2): 56.1 %
BH CV ECHO MEAS - EF(MOD-SP4): 56.1 %
BH CV ECHO MEAS - ESV(CUBED): 24.5 ML
BH CV ECHO MEAS - ESV(MOD-SP2): 36 ML
BH CV ECHO MEAS - ESV(MOD-SP4): 47 ML
BH CV ECHO MEAS - FS: 34.6 %
BH CV ECHO MEAS - IVS/LVPW: 1.18 CM
BH CV ECHO MEAS - IVSD: 1.04 CM
BH CV ECHO MEAS - LAT PEAK E' VEL: 13.8 CM/SEC
BH CV ECHO MEAS - LV DIASTOLIC VOL/BSA (35-75): 57.1 CM2
BH CV ECHO MEAS - LV MASS(C)D: 141.7 GRAMS
BH CV ECHO MEAS - LV MAX PG: 3.1 MMHG
BH CV ECHO MEAS - LV MEAN PG: 1.38 MMHG
BH CV ECHO MEAS - LV SYSTOLIC VOL/BSA (12-30): 25.1 CM2
BH CV ECHO MEAS - LV V1 MAX: 88.6 CM/SEC
BH CV ECHO MEAS - LV V1 VTI: 14.9 CM
BH CV ECHO MEAS - LVIDD: 4.4 CM
BH CV ECHO MEAS - LVIDS: 2.9 CM
BH CV ECHO MEAS - LVOT AREA: 2.32 CM2
BH CV ECHO MEAS - LVOT DIAM: 1.72 CM
BH CV ECHO MEAS - LVPWD: 0.88 CM
BH CV ECHO MEAS - MED PEAK E' VEL: 4.8 CM/SEC
BH CV ECHO MEAS - MR MAX PG: 131.1 MMHG
BH CV ECHO MEAS - MR MAX VEL: 572.6 CM/SEC
BH CV ECHO MEAS - MV DEC SLOPE: 412 CM/SEC2
BH CV ECHO MEAS - MV DEC TIME: 0.21 SEC
BH CV ECHO MEAS - MV E MAX VEL: 65.1 CM/SEC
BH CV ECHO MEAS - MV MAX PG: 2.45 MMHG
BH CV ECHO MEAS - MV MEAN PG: 1.22 MMHG
BH CV ECHO MEAS - MV P1/2T: 54.4 MSEC
BH CV ECHO MEAS - MV V2 VTI: 11.7 CM
BH CV ECHO MEAS - MVA(P1/2T): 4 CM2
BH CV ECHO MEAS - MVA(VTI): 3 CM2
BH CV ECHO MEAS - PA ACC TIME: 0.1 SEC
BH CV ECHO MEAS - PA V2 MAX: 59.7 CM/SEC
BH CV ECHO MEAS - PULM A REVS DUR: 0.06 SEC
BH CV ECHO MEAS - PULM A REVS VEL: 21.7 CM/SEC
BH CV ECHO MEAS - PULM DIAS VEL: 51.9 CM/SEC
BH CV ECHO MEAS - PULM S/D: 0.83
BH CV ECHO MEAS - PULM SYS VEL: 42.8 CM/SEC
BH CV ECHO MEAS - QP/QS: 1
BH CV ECHO MEAS - RAP SYSTOLE: 3 MMHG
BH CV ECHO MEAS - RV MAX PG: 1.72 MMHG
BH CV ECHO MEAS - RV V1 MAX: 65.6 CM/SEC
BH CV ECHO MEAS - RV V1 VTI: 13.4 CM
BH CV ECHO MEAS - RVOT DIAM: 1.81 CM
BH CV ECHO MEAS - RVSP: 27 MMHG
BH CV ECHO MEAS - SI(MOD-SP2): 24.6 ML/M2
BH CV ECHO MEAS - SI(MOD-SP4): 32 ML/M2
BH CV ECHO MEAS - SUP REN AO DIAM: 2.5 CM
BH CV ECHO MEAS - SV(LVOT): 34.6 ML
BH CV ECHO MEAS - SV(MOD-SP2): 46 ML
BH CV ECHO MEAS - SV(MOD-SP4): 60 ML
BH CV ECHO MEAS - SV(RVOT): 34.5 ML
BH CV ECHO MEAS - TAPSE (>1.6): 1.69 CM
BH CV ECHO MEAS - TR MAX PG: 23.5 MMHG
BH CV ECHO MEAS - TR MAX VEL: 242.5 CM/SEC
BH CV ECHO MEASUREMENTS AVERAGE E/E' RATIO: 7
BH CV ECHO SHUNT ASSESSMENT PERFORMED (HIDDEN SCRIPTING): 1
BH CV XLRA - RV BASE: 3.2 CM
BH CV XLRA - RV LENGTH: 6.6 CM
BH CV XLRA - RV MID: 2.09 CM
BH CV XLRA - TDI S': 8.6 CM/SEC
LEFT ATRIUM VOLUME INDEX: 41 ML/M2
SINUS: 2.9 CM
STJ: 2.5 CM

## 2024-01-03 PROCEDURE — 25510000001 PERFLUTREN (DEFINITY) 8.476 MG IN SODIUM CHLORIDE (PF) 0.9 % 10 ML INJECTION: Performed by: INTERNAL MEDICINE

## 2024-01-03 PROCEDURE — 93306 TTE W/DOPPLER COMPLETE: CPT

## 2024-01-03 RX ADMIN — PERFLUTREN 2 ML: 6.52 INJECTION, SUSPENSION INTRAVENOUS at 08:22

## 2024-01-03 NOTE — PROGRESS NOTES
Pt came in for a BP check with her machine.  Pt completed her ECHO.    Pt has a [employer] BP machine from the .    BP Machine     135/102 with 70 pulse    BP Manual     122/88 With 80 pulse    Reviewed Medications.    Current Meds:  Amlodipine 2.5mg QD  Metoprolol 50mg si in the AM and 1 in the PM    Pt said she did start watching her salt intake and cut out pickles.    Advised her to get a new BP Machine.  Gave her the Omron and Reli-On brand names that Dr Odell recommends.

## 2024-01-03 NOTE — TELEPHONE ENCOUNTER
Called patient and relayed message regarding need for CT guided biopsy as well as instructions for holding anticoagulation. Biopsy ordered. Message sent to scheduling. Ekta Gonzalez RN

## 2024-01-03 NOTE — TELEPHONE ENCOUNTER
----- Message from Elie Dixon MD sent at 1/2/2024  4:34 PM EST -----  Call her ct is abnormal we needs to hold anticoagulants 2 days before and aspirin one week before ct guided biopsy of mass in front of the heart, bismark her to come back and see me 5 days after, send path for hedy

## 2024-01-04 ENCOUNTER — TELEPHONE (OUTPATIENT)
Dept: CARDIOLOGY | Facility: CLINIC | Age: 64
End: 2024-01-04
Payer: MEDICARE

## 2024-01-04 NOTE — TELEPHONE ENCOUNTER
I spoke with Marylu Georges and updated pt on results/recommendations from provider.  Pt verbalized understanding and has no further questions at this time.    Thank you,    Emilie ARGUETA, RN  Triage [facility]  01/04/24 09:22 EST

## 2024-01-04 NOTE — TELEPHONE ENCOUNTER
Please let her know that echo looks good.  Her heart is strong and functioning well.  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

## 2024-01-06 ENCOUNTER — TELEPHONE (OUTPATIENT)
Dept: CARDIOLOGY | Facility: CLINIC | Age: 64
End: 2024-01-06
Payer: MEDICARE

## 2024-01-06 NOTE — PROGRESS NOTES
01/15/24 0001   Pre-Procedure Phone Call   Procedure Time Verified Yes   Arrival Time 0830   Procedure Location Verified Yes   Medical History Reviewed No   NPO Status Reinforced Yes   Ride and Caregiver Arranged Yes   Phone Number for Ride/Caregiver hold eliquis x 2 days prior to procedure. Last dose to be 1/12/24. Pt voiced understanding.   Patient Knows to Bring Current Medications No   Bring Outside Films Requested No

## 2024-01-08 NOTE — TELEPHONE ENCOUNTER
Spoke with pt.  She is aware of instructions to hold.  No other questions.  (Done)    Dr Odell: JUST FYI.

## 2024-01-15 ENCOUNTER — HOSPITAL ENCOUNTER (OUTPATIENT)
Dept: CT IMAGING | Facility: HOSPITAL | Age: 64
Discharge: HOME | End: 2024-01-15
Admitting: RADIOLOGY
Payer: MEDICARE

## 2024-01-15 VITALS
WEIGHT: 171 LBS | OXYGEN SATURATION: 99 % | RESPIRATION RATE: 16 BRPM | SYSTOLIC BLOOD PRESSURE: 120 MMHG | TEMPERATURE: 98 F | HEIGHT: 60 IN | DIASTOLIC BLOOD PRESSURE: 91 MMHG | HEART RATE: 81 BPM | BODY MASS INDEX: 33.57 KG/M2

## 2024-01-15 DIAGNOSIS — I31.8 PERICARDIAL MASS: ICD-10-CM

## 2024-01-15 LAB
INR PPP: 1.1 (ref 0.8–1.2)
PLATELET # BLD AUTO: 180 10*3/MM3 (ref 140–450)
PROTHROMBIN TIME: 13.3 SECONDS (ref 12.8–15.2)

## 2024-01-15 PROCEDURE — 25010000002 LIDOCAINE 1 % SOLUTION: Performed by: RADIOLOGY

## 2024-01-15 PROCEDURE — 85610 PROTHROMBIN TIME: CPT

## 2024-01-15 PROCEDURE — 85049 AUTOMATED PLATELET COUNT: CPT | Performed by: RADIOLOGY

## 2024-01-15 PROCEDURE — 25010000002 FENTANYL CITRATE (PF) 50 MCG/ML SOLUTION: Performed by: RADIOLOGY

## 2024-01-15 PROCEDURE — 25010000002 MIDAZOLAM PER 1 MG: Performed by: RADIOLOGY

## 2024-01-15 PROCEDURE — 88360 TUMOR IMMUNOHISTOCHEM/MANUAL: CPT | Performed by: INTERNAL MEDICINE

## 2024-01-15 PROCEDURE — 77012 CT SCAN FOR NEEDLE BIOPSY: CPT

## 2024-01-15 PROCEDURE — 88342 IMHCHEM/IMCYTCHM 1ST ANTB: CPT | Performed by: INTERNAL MEDICINE

## 2024-01-15 PROCEDURE — 88341 IMHCHEM/IMCYTCHM EA ADD ANTB: CPT | Performed by: INTERNAL MEDICINE

## 2024-01-15 PROCEDURE — 88305 TISSUE EXAM BY PATHOLOGIST: CPT | Performed by: INTERNAL MEDICINE

## 2024-01-15 RX ORDER — MIDAZOLAM HYDROCHLORIDE 1 MG/ML
0.5 INJECTION INTRAMUSCULAR; INTRAVENOUS ONCE AS NEEDED
Status: DISCONTINUED | OUTPATIENT
Start: 2024-01-15 | End: 2024-01-16 | Stop reason: HOSPADM

## 2024-01-15 RX ORDER — LIDOCAINE HYDROCHLORIDE 10 MG/ML
20 INJECTION, SOLUTION INFILTRATION; PERINEURAL ONCE
Status: COMPLETED | OUTPATIENT
Start: 2024-01-15 | End: 2024-01-15

## 2024-01-15 RX ORDER — SODIUM CHLORIDE 9 MG/ML
25 INJECTION, SOLUTION INTRAVENOUS ONCE
Status: COMPLETED | OUTPATIENT
Start: 2024-01-15 | End: 2024-01-15

## 2024-01-15 RX ORDER — FENTANYL CITRATE 50 UG/ML
INJECTION, SOLUTION INTRAMUSCULAR; INTRAVENOUS AS NEEDED
Status: COMPLETED | OUTPATIENT
Start: 2024-01-15 | End: 2024-01-15

## 2024-01-15 RX ORDER — MIDAZOLAM HYDROCHLORIDE 1 MG/ML
INJECTION INTRAMUSCULAR; INTRAVENOUS AS NEEDED
Status: COMPLETED | OUTPATIENT
Start: 2024-01-15 | End: 2024-01-15

## 2024-01-15 RX ORDER — SODIUM CHLORIDE 0.9 % (FLUSH) 0.9 %
10 SYRINGE (ML) INJECTION AS NEEDED
Status: DISCONTINUED | OUTPATIENT
Start: 2024-01-15 | End: 2024-01-16 | Stop reason: HOSPADM

## 2024-01-15 RX ADMIN — LIDOCAINE HYDROCHLORIDE 20 ML: 10 INJECTION, SOLUTION INFILTRATION; PERINEURAL at 09:44

## 2024-01-15 RX ADMIN — SODIUM CHLORIDE 25 ML/HR: 9 INJECTION, SOLUTION INTRAVENOUS at 09:40

## 2024-01-15 RX ADMIN — MIDAZOLAM 1 MG: 1 INJECTION INTRAMUSCULAR; INTRAVENOUS at 09:44

## 2024-01-15 RX ADMIN — FENTANYL CITRATE 50 MCG: 50 INJECTION, SOLUTION INTRAMUSCULAR; INTRAVENOUS at 09:44

## 2024-01-15 NOTE — DISCHARGE INSTRUCTIONS
EDUCATION /DISCHARGE INSTRUCTIONS  CT/US guided biopsy:  A biopsy is a procedure done to remove tissue for further analysis.  Before images are taken to locate the target area.  Images can be obtained using ultrasound, CT or MRI.  A physician will clean your skin with antiseptic soap, place a sterile towel around the site and administer a local anesthetic to numb the area.  The physician will then insert a special needle.  Sometimes images are taken of the needle after it is inserted to ensure the needle is in the correct area to be biopsied.   A sample is obtained and sent to the laboratory for study.  Occasionally the laboratory is unable to make a diagnosis from the sample and the procedure may need to be repeated.  Within a week the radiologist will send a report to your physician.  A pathologist will also examine the tissue and send a report.    Risks of the procedure include but are not limited to:   *  Bleeding    *  Infection   *  Puncture of surrounding organs *  Death     *  Lung collapse if the biopsy is near the chest which may require insertion of a chest tube to re-inflate the lung if severe.      Benefits of the procedure:  Using x-ray helps to locate the area that requires a biopsy. The procedure is less invasive than a surgical procedure, there are no large incisions and it does not require anesthesia.    Alternatives to the procedure:  A biopsy can be performed surgically.  Risks of a surgical biopsy include exposure to anesthesia, infection, excessive bleeding and injury to abdominal organs.  A benefit of surgical biopsy is the ability to see the area to be biopsied and remove of a larger piece of tissue.    THIS EDUCATION INFORMATION WAS REVIEWED PRIOR TO PROCEDURE AND CONSENT.     Post Procedure:    *  Expect the biopsy site may be tender up to one week.    *  Rest today (no pushing pulling or straining).   *  Slowly increase activity tomorrow.    *  If you received sedation do not drive for  24 hours.   *  Keep dressing clean and dry.   *  Leave dressing on puncture site for 24 hours.    *  You may shower when dressing removed.  Call your doctor if experiencing:   *  Signs of infection such as redness, swelling, excessive pain and / or foul        smelling drainage from the puncture site.   *  Chills or fever over 101 degrees (by mouth).   *  Unrelieved pain.   *  Any new or severe symptoms.   *  If experiencing sudden / severe shortness of breath or chest pain go to the       nearest emergency room.   Following the procedure:     Follow-up with the ordering physician as directed.    Continue to take other medications as directed by your physician unless    otherwise instructed.   If applicable, resume taking your blood thinners or Aspirin on ___________.    If you have any concerns please call the Radiology Nurses Desk at [phone].  You are the most important factor in your recovery.  Follow the above instructions carefully.               EDUCATION / DISCHARGE INSTRUCTIONS  Aspiration:  An aspiration is a procedure used to take a sample of cells or tissue from a lump, mass or other area of concern.   Within a week the radiologist will send a report to your physician.  A pathologist will also examine the tissue and send a report.  THIS EDUCATION INFORMATION WAS REVIEWED PRIOR TO THE PROCEDURE AND CONSENT.      Post Procedure:    *  Rest today (no pushing pulling or straining).   *  Slowly increase activity tomorow.    *  If you received sedation do not drive for 24 hours.   *  Keep dressing clean and dry.   *  Leave dressing on puncture site for 24 hours.    *  You may shower when dressing removed.   *  If needed, use an ice pack at the site for up to 20 minutes at a time for pain.    Call your doctor if experiencing:   *  Signs of infection such as redness, swelling, excessive pain and / or foul smelling drainage from the puncture site.   *  Chills or fever over 101 degrees (by mouth).   *   Unrelieved pain.   *  Any new or severe symptoms.      Following the procedure:     Follow-up with the ordering physician as directed.    Continue to take other medications as directed by your physician unless  otherwise instructed.          If you have any concerns please call the Radiology Nurses Desk at [phone].  You are the most important factor in your recovery.  Follow the above instructions carefully.

## 2024-01-15 NOTE — NURSING NOTE
Pt tolerated procedure well with no c/o.  Post instructions reviewed with all questions and concerns addressed to pt's satisfaction.  Transported pt to discharge area, via w/c, where Carol, friend, was waiting to take pt home.

## 2024-01-15 NOTE — POST-PROCEDURE NOTE
POST PROCEDURE NOTE    Procedure: ct biopsy anterior mediastainal mass    Pre-Procedure Diagnosis: anterior mediastinal mass    Post-procedure Diagnosis: same    Findings: technically successful ct biopsy of anterior mediastinal mass    Complications: no immediate    Blood loss: none    Specimen Removed: two 20 gauge cores    Disposition:   Discharge home

## 2024-01-16 ENCOUNTER — TELEPHONE (OUTPATIENT)
Dept: INTERVENTIONAL RADIOLOGY/VASCULAR | Facility: HOSPITAL | Age: 64
End: 2024-01-16
Payer: MEDICARE

## 2024-01-17 ENCOUNTER — TELEPHONE (OUTPATIENT)
Dept: ONCOLOGY | Facility: CLINIC | Age: 64
End: 2024-01-17

## 2024-01-17 ENCOUNTER — TELEPHONE (OUTPATIENT)
Dept: ONCOLOGY | Facility: CLINIC | Age: 64
End: 2024-01-17
Payer: MEDICARE

## 2024-01-17 ENCOUNTER — SPECIALTY PHARMACY (OUTPATIENT)
Dept: PHARMACY | Facility: HOSPITAL | Age: 64
End: 2024-01-17
Payer: MEDICARE

## 2024-01-17 DIAGNOSIS — C50.812 MALIGNANT NEOPLASM OF OVERLAPPING SITES OF LEFT BREAST IN FEMALE, ESTROGEN RECEPTOR POSITIVE: Primary | ICD-10-CM

## 2024-01-17 DIAGNOSIS — Z17.0 MALIGNANT NEOPLASM OF OVERLAPPING SITES OF LEFT BREAST IN FEMALE, ESTROGEN RECEPTOR POSITIVE: Primary | ICD-10-CM

## 2024-01-17 NOTE — TELEPHONE ENCOUNTER
Called patient and relayed message: HER PATH SHOWS MET BREAST CANCER , I NEED TO PLACE HER BACK ON [facility] ASAP, I WILL DO HER EKG HERE IN FEW DAYS.   EMELY: PLEASE CHANGE HER NERI FOR THE NEXT 1 OR 2 DAYS TO SEE ME. Patient v/u. Message sent to scheduling and MTM. Ekta Gonzalez RN

## 2024-01-18 ENCOUNTER — SPECIALTY PHARMACY (OUTPATIENT)
Dept: PHARMACY | Facility: HOSPITAL | Age: 64
End: 2024-01-18
Payer: MEDICARE

## 2024-01-18 ENCOUNTER — LAB (OUTPATIENT)
Dept: LAB | Facility: HOSPITAL | Age: 64
End: 2024-01-18
Payer: MEDICARE

## 2024-01-18 ENCOUNTER — OFFICE VISIT (OUTPATIENT)
Dept: ONCOLOGY | Facility: CLINIC | Age: 64
End: 2024-01-18
Payer: MEDICARE

## 2024-01-18 ENCOUNTER — APPOINTMENT (OUTPATIENT)
Dept: ONCOLOGY | Facility: HOSPITAL | Age: 64
End: 2024-01-18
Payer: MEDICARE

## 2024-01-18 VITALS
HEIGHT: 60 IN | OXYGEN SATURATION: 98 % | HEART RATE: 78 BPM | TEMPERATURE: 98.2 F | RESPIRATION RATE: 18 BRPM | SYSTOLIC BLOOD PRESSURE: 136 MMHG | DIASTOLIC BLOOD PRESSURE: 99 MMHG | BODY MASS INDEX: 34.55 KG/M2 | WEIGHT: 176 LBS

## 2024-01-18 DIAGNOSIS — Z17.0 MALIGNANT NEOPLASM OF OVERLAPPING SITES OF LEFT BREAST IN FEMALE, ESTROGEN RECEPTOR POSITIVE: ICD-10-CM

## 2024-01-18 DIAGNOSIS — C50.812 MALIGNANT NEOPLASM OF OVERLAPPING SITES OF LEFT BREAST IN FEMALE, ESTROGEN RECEPTOR POSITIVE: Primary | ICD-10-CM

## 2024-01-18 DIAGNOSIS — I10 PRIMARY HYPERTENSION: Primary | ICD-10-CM

## 2024-01-18 DIAGNOSIS — Z17.0 MALIGNANT NEOPLASM OF OVERLAPPING SITES OF LEFT BREAST IN FEMALE, ESTROGEN RECEPTOR POSITIVE: Primary | ICD-10-CM

## 2024-01-18 DIAGNOSIS — C50.812 MALIGNANT NEOPLASM OF OVERLAPPING SITES OF LEFT BREAST IN FEMALE, ESTROGEN RECEPTOR POSITIVE: ICD-10-CM

## 2024-01-18 PROBLEM — K71.6 DRUG-INDUCED HEPATITIS: Status: RESOLVED | Noted: 2021-09-30 | Resolved: 2024-01-18

## 2024-01-18 PROBLEM — R21 RASH: Status: RESOLVED | Noted: 2022-06-27 | Resolved: 2024-01-18

## 2024-01-18 PROBLEM — T50.905A DRUG-INDUCED HEPATITIS: Status: RESOLVED | Noted: 2021-09-30 | Resolved: 2024-01-18

## 2024-01-18 PROBLEM — M00.272: Status: RESOLVED | Noted: 2022-06-27 | Resolved: 2024-01-18

## 2024-01-18 LAB
ALBUMIN SERPL-MCNC: 4.4 G/DL (ref 3.5–5.2)
ALBUMIN/GLOB SERPL: 1.5 G/DL
ALP SERPL-CCNC: 94 U/L (ref 39–117)
ALT SERPL W P-5'-P-CCNC: 17 U/L (ref 1–33)
ANION GAP SERPL CALCULATED.3IONS-SCNC: 8.8 MMOL/L (ref 5–15)
AST SERPL-CCNC: 24 U/L (ref 1–32)
BASOPHILS # BLD AUTO: 0.05 10*3/MM3 (ref 0–0.2)
BASOPHILS NFR BLD AUTO: 1.1 % (ref 0–1.5)
BILIRUB SERPL-MCNC: 0.3 MG/DL (ref 0–1.2)
BUN SERPL-MCNC: 21 MG/DL (ref 8–23)
BUN/CREAT SERPL: 22.3 (ref 7–25)
CALCIUM SPEC-SCNC: 9.8 MG/DL (ref 8.6–10.5)
CHLORIDE SERPL-SCNC: 100 MMOL/L (ref 98–107)
CO2 SERPL-SCNC: 28.2 MMOL/L (ref 22–29)
CREAT SERPL-MCNC: 0.94 MG/DL (ref 0.57–1)
DEPRECATED RDW RBC AUTO: 49.4 FL (ref 37–54)
EGFRCR SERPLBLD CKD-EPI 2021: 67.1 ML/MIN/1.73
EOSINOPHIL # BLD AUTO: 0.08 10*3/MM3 (ref 0–0.4)
EOSINOPHIL NFR BLD AUTO: 1.7 % (ref 0.3–6.2)
ERYTHROCYTE [DISTWIDTH] IN BLOOD BY AUTOMATED COUNT: 14.2 % (ref 12.3–15.4)
GLOBULIN UR ELPH-MCNC: 2.9 GM/DL
GLUCOSE SERPL-MCNC: 110 MG/DL (ref 65–99)
HCT VFR BLD AUTO: 43.5 % (ref 34–46.6)
HGB BLD-MCNC: 13.9 G/DL (ref 12–15.9)
IMM GRANULOCYTES # BLD AUTO: 0.01 10*3/MM3 (ref 0–0.05)
IMM GRANULOCYTES NFR BLD AUTO: 0.2 % (ref 0–0.5)
LYMPHOCYTES # BLD AUTO: 1.12 10*3/MM3 (ref 0.7–3.1)
LYMPHOCYTES NFR BLD AUTO: 24.5 % (ref 19.6–45.3)
MAGNESIUM SERPL-MCNC: 2 MG/DL (ref 1.6–2.4)
MCH RBC QN AUTO: 30.3 PG (ref 26.6–33)
MCHC RBC AUTO-ENTMCNC: 32 G/DL (ref 31.5–35.7)
MCV RBC AUTO: 95 FL (ref 79–97)
MONOCYTES # BLD AUTO: 0.37 10*3/MM3 (ref 0.1–0.9)
MONOCYTES NFR BLD AUTO: 8.1 % (ref 5–12)
NEUTROPHILS NFR BLD AUTO: 2.95 10*3/MM3 (ref 1.7–7)
NEUTROPHILS NFR BLD AUTO: 64.4 % (ref 42.7–76)
NRBC BLD AUTO-RTO: 0 /100 WBC (ref 0–0.2)
PHOSPHATE SERPL-MCNC: 3.9 MG/DL (ref 2.5–4.5)
PLATELET # BLD AUTO: 218 10*3/MM3 (ref 140–450)
PMV BLD AUTO: 8.6 FL (ref 6–12)
POTASSIUM SERPL-SCNC: 4.4 MMOL/L (ref 3.5–5.2)
PROT SERPL-MCNC: 7.3 G/DL (ref 6–8.5)
RBC # BLD AUTO: 4.58 10*6/MM3 (ref 3.77–5.28)
SODIUM SERPL-SCNC: 137 MMOL/L (ref 136–145)
WBC NRBC COR # BLD AUTO: 4.58 10*3/MM3 (ref 3.4–10.8)

## 2024-01-18 PROCEDURE — 84100 ASSAY OF PHOSPHORUS: CPT

## 2024-01-18 PROCEDURE — 80053 COMPREHEN METABOLIC PANEL: CPT

## 2024-01-18 PROCEDURE — 85025 COMPLETE CBC W/AUTO DIFF WBC: CPT

## 2024-01-18 PROCEDURE — 83735 ASSAY OF MAGNESIUM: CPT

## 2024-01-18 PROCEDURE — 36415 COLL VENOUS BLD VENIPUNCTURE: CPT

## 2024-01-18 NOTE — PROGRESS NOTES
Staff message rec from [facility] Pharmacist-Dr Dixon would like to start pt on Kisqali 200 mg 21 days out of 28.    I have attempted to submit a PA to [employer] through covermymeds and rec a response stating the pt currently has access to the medication and PA is not needed.    I ran a test claim through [facility] and it returned a copay of $2,095.72. I will use a voucher for her first month and seek free drug for future fills. At this time the [facility] do not have funding available for her diagnosis. This will be discussed with her at education.    Pt is scheduled for education on 1/23/2024-Once she is fully educated the medication will be scheduled for delivery.    Gina Lopez RPH sent to Estelle Emmaneul, Pharmacy Technician         Previous Messages       ----- Message -----  From: Elie Dixon MD  Sent: 1/17/2024   4:20 PM EST  To: Gina Lopez RPH  Subject: RE: Kisqali restart                              200 mg bid  ----- Message -----  From: Gina Lopez RPH  Sent: 1/17/2024   9:24 AM EST  To: Elie Dixon MD; *  Subject: Kisqali restart                                  Maybe a CBC/CMP magnesium, phos, calcium.    What dose of kisqali would you like??    Gina Tay  ----- Message -----  From: Ekta Gonzalez, RN  Sent: 1/17/2024   8:35 AM EST  To: [facility] Oral Onc Pool    HER PATH SHOWS MET BREAST CANCER , I NEED TO PLACE HER BACK ON KISQALI ASAP, I WILL DO HER EKG HERE IN FEW DAYS ANY OTHER RECS?.  EMELY: PLEASE CHANGE HER NERI FOR THE NEXT 1 OR 2 DAYS TO SEE ME       Estelle Emmanuel, Pharmacy Technician  Specialty Pharmacy Technician

## 2024-01-18 NOTE — PROGRESS NOTES
Subjective     REASON FOR FOLLOW UP:  1. 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 R BREAST MASS WITH GIANT R AXILLARY ADENOPATHY.  SHE UNDERWENT DOSE DENSE AC, TAXOL, BILATERAL MASTECTOMIES, DRAMATIC NEAR COMPLETE CO, RADIATION THERAPY AND ADJUVANT FEMARA STOPPED ON 12/21 BECAUSE DRUG INDUCED HEPATITIS, SWITCHED TO AROMASIN 2/22: NO SIDE EFFECTS.    3. FAMILY HISTORY OF BREAST CANCER IN MOTHER AND SISTER, SISTER WAS TESTED FOR BRCA AND WAS NEGATIVE.    4. LEFT OVARIAN CYST , IT HAS BEEN PRESENT FOR LONG TIME BUT IS GETTING LARGER, ASYMPTOMATIC, NEED FOR , PELVIC US AND GYN ONC EVal: no need for any intervention                  5. LEFT HIP PAIN SEVERE ARTHRITIS, REAL NEED FOR LEFT HIP REPLACEMENT: PERFORMED 8/21    6. DRUG INDUCED HEPATITIS,  FINALLY STOPPED FEMARA: DRAMATIC IMPROVEMENT AND RESOLUTION.      History of present illness:    On 12/28/2023 this 63-year-old female returns to the office in company of her brother in order to be reviewed. She has been in the office of, Danni Odell MD, Cardiology this morning and Dr. Odell called me concerned that the patient was having occasional intermittent pleuritic pain in the chest wall anteriorly on the right side in Absence of any pain in the rib cage per se. She has not had any cough, sputum production, hemoptysis or shortness of breath. Her appetite has remained excellent, she has gained weight and she was eating exaggerated amount of salt to the point that her blood pressure is on the rise very dramatically. The patient is back into atrial fibrillation.     She has history of bilateral breast cancers. She was treated according to protocol above, she achieved a complete remission and she remains on Aromasin since Femara triggered very significant drug induced hepatitis. Aromasin is taken on a daily basis 25 mg a day with no side effects. The patient has proper bowel function, proper urination. She admits that she is taking  ibuprofen at least 4-6 tablets a week. This also could be raising her blood pressure.    She is not drinking any alcohol at this time. Besides this no other issues besides her chronic arthritic pain in hips, elbows, hands from time to time. She is functional and capable of taking care of herself.     The patient returned to the office on 01/18/2024 after we have proceeded with radiological intervention including a CT-guided biopsy of a retrosternal mass. The pathology report became available yesterday. We called the patient to bring her to the office today to review this and initiate therapy. This has shown metastatic breast cancer, ER positive, [place] positive, HER2/maria eugenia negative. Her CA 15-3 was normal. The patient will require formal education and consent for Kisqali administration. She already has taken it in the past. She never had any problems with the medicine. Besides this the patient will require as well PET scan in days. She has no other new symptomatology and there is no obvious clinical evidence of any other distant metastasis.          Past Medical History:   Diagnosis Date    Anemia in neoplastic disease     Arthritis     Arthritis of back     Arthritis of neck     Breast cancer     Left    CVA (cerebral vascular accident)     Encounter for long-term (current) use of other medications 06/22/2021    Fracture, fibula     Fracture, finger     Frozen shoulder     Hip arthrosis 01/17    Hip pain     RIGHT HIP... CYST    History of fracture of leg 1987    History of radiation therapy     LAST TREATMENT      Hypertension     Knee swelling     Limited joint range of motion (ROM)     RIGHT HIP    Low back strain     Periarthritis of shoulder     Rotator cuff syndrome 6/22    Skin sore     OPEN SORE LEFT BREAST    Syncope     Vertigo            Past Surgical History:   Procedure Laterality Date    AXILLARY LYMPH NODE BIOPSY/EXCISION Right     LYMPH NODE UNDER RIGHT ARM-MALIGNANT (DOUBLE MASTECTOMY)    BREAST  BIOPSY Left     MALIGNANT    FRACTURE SURGERY  1987    Leg    HARDWARE REMOVAL      INCISION AND DRAINAGE LEG Left 06/30/2022    Procedure: INCISION AND DRAINAGE left ankle;  Surgeon: Kenneth Tran MD;  Location: [facility] OR;  Service: Orthopedics;  Laterality: Left;    JOINT REPLACEMENT Bilateral mar 2020,july 2021    hips    MASTECTOMY W/ SENTINEL NODE BIOPSY Bilateral 09/16/2019    Procedure: BILATERLA MODIFIED RADICAL MASTECTOMY WITH BILATERAL SENTINEL LYMPH NODE BIOPSY;  Surgeon: Joby Barron Jr., MD;  Location: [facility] MAIN OR;  Service: General    TOTAL HIP ARTHROPLASTY Right 03/02/2020    Procedure: RIGHT TOTAL HIP ARTHROPLASTY NATALY NAVIGATION;  Surgeon: Luis M Leonard MD;  Location: [facility] MAIN OR;  Service: Orthopedics;  Laterality: Right;    TOTAL HIP ARTHROPLASTY Left 07/20/2021    Procedure: Posterior LEFT TOTAL HIP ARTHROPLASTY NATALY NAVIGATION;  Surgeon: Luis M Leonard MD;  Location: [facility] OR;  Service: Orthopedics;  Laterality: Left;    VENOUS ACCESS DEVICE (PORT) INSERTION Right 02/01/2019    Procedure: INSERTION VENOUS ACCESS DEVICE;  Surgeon: Joby Barron Jr., MD;  Location: [facility] OSC;  Service: General    VENOUS ACCESS DEVICE (PORT) REMOVAL N/A 10/30/2019    Procedure: Mediport Removal;  Surgeon: Joby Barron Jr., MD;  Location: [facility] OR;  Service: General        Current Outpatient Medications on File Prior to Visit   Medication Sig Dispense Refill    acetaminophen (TYLENOL) 325 MG tablet Take 2 tablets by mouth Every 6 (Six) Hours As Needed.      amLODIPine (NORVASC) 2.5 MG tablet Take 1 tablet by mouth Daily. 30 tablet 11    apixaban (ELIQUIS) 5 MG tablet tablet Take 1 tablet by mouth Every 12 (Twelve) Hours. Indications: Atrial Fibrillation 60 tablet 5    doxylamine (UNISOM) 25 MG tablet Take 1 tablet by mouth At Night As Needed for Sleep.      exemestane (AROMASIN) 25 MG tablet TAKE 1 TABLET BY MOUTH DAILY 30 tablet 3    metoprolol succinate XL  "(TOPROL-XL) 50 MG 24 hr tablet Take 2 tablets po q am and one tablet po q pm 270 tablet 3    polyethylene glycol 17 g packet Take 17 g by mouth 2 (Two) Times a Day As Needed (constipation).      tiZANidine (ZANAFLEX) 4 MG tablet Take 1 tablet by mouth Every 6 (Six) Hours As Needed for Muscle Spasms.      [DISCONTINUED] digoxin (LANOXIN) 125 MCG tablet Take 1 tablet by mouth Daily.       Current Facility-Administered Medications on File Prior to Visit   Medication Dose Route Frequency Provider Last Rate Last Admin    Chlorhexidine Gluconate Cloth 2 % pads 1 each  1 each Apply externally Take As Directed Luis M Leonard MD            ALLERGIES:    Allergies   Allergen Reactions    Benadryl [Diphenhydramine] Itching     INCREASES BLOOD PRESSURE    Erythromycin GI Intolerance    Levaquin [Levofloxacin] GI Intolerance    Penicillins GI Intolerance        Social History     Socioeconomic History    Marital status:      Spouse name: Cruz    Number of children: 0   Tobacco Use    Smoking status: Never    Smokeless tobacco: Never   Vaping Use    Vaping Use: Never used   Substance and Sexual Activity    Alcohol use: Yes     Comment: occasional    Drug use: No    Sexual activity: Not Currently     Partners: Male     Birth control/protection: Abstinence        Family History   Problem Relation Age of Onset    Lung cancer Father     Irritable bowel syndrome Father     Cancer Mother     Breast cancer Mother     Liver disease Mother     Breast cancer Sister 48    Breast cancer Maternal Grandmother     Breast cancer Paternal Grandmother     Breast cancer Maternal Uncle     Malig Hyperthermia Neg Hx             Objective     Vitals:    01/18/24 1453   BP: 136/99   Pulse: 78   Resp: 18   Temp: 98.2 °F (36.8 °C)   TempSrc: Temporal   SpO2: 98%   Weight: 79.8 kg (176 lb)   Height: 152.4 cm (60\")   PainSc: 0-No pain             1/18/2024     2:54 PM   Current Status   ECOG score 0     Exam:                   GENERAL:  " Well-developed, Patient  in no acute distress.   SKIN:  Warm, dry ,NO purpura ,no rash.  HEENT:  Pupils were equal and reactive to light and accomodation, conjunctivae noninjected,  normal visual acuity.   NECK:  Supple with good range of motion; no thyromegaly , no JVD or bruits,.No carotid artery pain, no carotid abnormal pulsation   LYMPHATICS:  No cervical, NO supraclavicular, NO axillary, NO inguinal adenopathies.  CARDIAC   normal rate , regular rhythm, without murmur,NO rubs NO S3 NO S4   LUNGS: normal breath sounds bilateral, no wheezing, NO rhonchi, NO crackles ,NO rubs.  VASCULAR VENOUS: no cyanosis, NO collateral circulation, NO varicosities, NO edema, NO palpable cords, NO pain,NO erythema, NO pigmentation of the skin.  ABDOMEN:  Soft, NO pain,no hepatomegaly, no splenomegaly,no masses, no ascites, no collateral circulation,no distention.  EXTREMITIES  AND SPINE:  No clubbing, no cyanosis ,no deformities , no pain .No kyphosis,  no pain in spine, no pain in ribs , no pain in pelvic bone.  NEUROLOGICAL:  Patient was awake, alert, oriented to time, person and place.  Bilateral mastectomy with surgical scars well healed. No masses, nodularity or tenderness in the chest wall or at the biopsy site.        RECENT LABS:  Hematology Results from last 7 days   Lab Units 01/18/24  1437 01/15/24  0910   WBC 10*3/mm3 4.58  --    NEUTROS ABS 10*3/mm3 2.95  --    HEMOGLOBIN g/dL 13.9  --    HEMATOCRIT % 43.5  --    PLATELETS 10*3/mm3 218 180     Results from last 7 days   Lab Units 01/18/24  1437   SODIUM mmol/L 137   POTASSIUM mmol/L 4.4   CHLORIDE mmol/L 100   CO2 mmol/L 28.2   BUN mg/dL 21   CREATININE mg/dL 0.94   CALCIUM mg/dL 9.8   ALBUMIN g/dL 4.4   BILIRUBIN mg/dL 0.3   ALK PHOS U/L 94   ALT (SGPT) U/L 17   AST (SGOT) U/L 24   GLUCOSE mg/dL 110*   MAGNESIUM mg/dL 2.0     Cancer Antigen 15-3  Order: [id number]  Status: Final result       Visible to patient: Yes (seen)       Next appt: 01/23/2024 at 10:00 AM in  Oncology ([facility])       Dx: Primary insomnia; Primary hypertensio...    Specimen Information: Blood   0 Result Notes            Component  Ref Range & Units 3 wk ago  (12/28/23) 6 mo ago  (6/27/23) 9 mo ago  (3/29/23) 1 yr ago  (12/28/22) 1 yr ago  (10/25/22) 1 yr ago  (8/16/22) 1 yr ago  (5/9/22)   CA 15-3  <=25.0 U/mL 17.5 18.5 14.6 18.3 13.6 20.0 14.6   Resulting Agency  [facility]  [facility]  [facility]  [facility]  [facility]  [facility]              Narrative  Performed by:  [facility]  Results may be falsely decreased if patient taking Biotin.    Testing Method: [employer] Electrochemiluminescence Immunoassay(ECLIA)  Values obtained with different assay methods or kits cannot be used interchangeably.      Specimen Collected: 12/28/23 10:35 EST Last Resulted: 12/28/23 13:03 EST           Results from last 7 days   Lab Units 01/15/24  0857   INR  1.1        Tissue Pathology Exam (01/15/2024 09:50)     Assessment & Plan    1.  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   For at least 4 years, she has had an in crescendo mass in the left breast that has produced a gigantic tumor that was close to 25 cm in size and is replacing most of the anatomy of the left breast. There are areas of ulceration necrosis and bleeding and the mass is fixed to the chest wall. She has abundant amount of collateral circulation in the left anterior chest wall and it caught my attention the lack of any left axillary adenopathy. She has no lymphedema in the left upper extremity. In the right breast while in sitting position, no palpable abnormalities are documented. The right breast skin and nipple are normal. When the patient lies flat, there is a palpable mass at 9 o'clock from the nipple that measures probably 4 cm in size, very indistinct in regard to edges and margins. There was no mobility and no  areas of tenderness. She has a giant adenopathy in the right axilla that measures close to 9 cm in size, uniform, no fluctuation, nontender, completely fixed to the axillary wall. There is no compromise of the skin.   Patient completed 4 cycles of neoadjuvant AC on 4/12/2019.    Patient completed neoadjuvant weekly Taxol x 12 treatments on 7/19/2019.  9/16/2019 bilateral mastectomy by Dr. Joby Barron with axillary lymph node dissection.  No residual malignancy.  10/30/2019 patient's Mediport was removed in anticipation of radiation.  Radiation therapy under the care of Dr. Marmolejo 10/31/2019-1/13/2020 to the right chest wall.  Started on adjuvant Femara 2.5 mg daily 8/20/2019.  9/21/2020 continues on adjuvant Femara, tolerating it quite well.  Some mild hot flashes and mild arthralgias, all which are tolerable.  The patient returned to the office on 05/24/2021. Since the previous visit the patient proceeded with a PET scan and I have reviewed this with her in the PAC system showing chest wall on the left side area of enlightening SUV activity that is almost 3 cm long x 7 mm wide. It is behind the bone. It is very close to the lower aspect of her heart. The reset of the PET scan discloses no activity. This is also specific in regard to the ovaries and actually an ultrasound of her vagina looking into the ovaries documented an unilocular cyst on the left side with typical features of benignity and a  was completely normal 5.5.   The patient was further reviewed on 07/07/2021 after she has continued her Kisqali and completion of the 1st round of the medicine. Today her EKG disclosed a normal QT interval and this has been sent to Cardiology to be reported officially. 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 She has completed her radiation therapy to the epicardial right lymph node with no difficulties or side effects.   The patient was further reviewed on 09/30/2021. Patient has  recovered well from her left hip replacement. She completed her Kisqali a week ago. Her white count is low today. The hemoglobin is stable. The platelet count is stable. Elevation in her liver function test, SGOT, SGPT noted. The patient is not drinking any alcohol. She is not taking any cholesterol medicine. She is not taking any anti-inflammatory medication and leads me to think that Kisqali is the culprit of this. Given these findings we advised the patient to put the Kisqali on hold until we recheck chemistry profile on weekly basis for the next 3 or 4 weeks. We need to settle these numbers down before she continues the Kisqali. She probably will require dose adjustment at some point. Again, her hepatitis A, B and C serologies are negative.   n regard to her history of breast cancer she was reviewed on 11/19/2021. Since the previous visit the patient has had tumor marker CA 15-3 that has dropped to 20. Radiological assessment of her chest and abdomen CT scan that documents resolution of the epicardial lymphadenopathy in the right hemithorax, no pulmonary nodules or metastasis, no pleural effusion and no liver metastasis. No bone metastasis. Based on this information I do believe that the breast cancer is relatively quiet clinically, biochemically and radiologically and I advised her today to resume her Femara at the dose of 2.5 mg a day. The likelihood of Femara producing liver dysfunction is more than remote.   The patient was further reviewed on 12/29/2021. Recent review of her liver function tests a few days ago documented persistent elevation of her SGOT, SGPT, and alkaline phosphatase. She has discontinued most of the medicines and I have no other choice but discontinue the Femara. Since then and actually today is the 1st day in many weeks that the SGOT and SGPT and alkaline phosphatase are improving. The patient actually remains asymptomatic in this regard. Her liver is not enlarged. She has no jaundice.  She has no rash, no skin lesions and no other new alterations. That is good news. Her white blood count, hemoglobin and platelets remain stable. 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 That is extremely rare. As I posted above, I discussed with all my partners in regard to how many times through their careers prescribing Femara to multiple breast cancers they have seen Femara triggering hepatitis, none of them gave me a positive answer. I reviewed this information in UpToDate and it is reported in less than 1% of patients taking this medication. I think we are in front of a rare situation but I want for her to withhold any further Femara treatment for the time being and I want to review her back in 3 weeks. If the liver function tests continue improving or normalize by then maybe we will have the answer once and for all. Raises the question what we will do next and I think the next medicine will be the utilization of Aromasin that has a different chemical component and different methodology for action. I made her aware of that. We are not going to go back in giving her Kisqali under the present circumstances given the confusion and all the issues pertinent to her liver dysfunction.   The patient was further reviewed on 03/07/2022. Since the previous visit the patient has stopped the Femara in 12/2021 and today her liver function tests are completely back to normality. This proves the point that Femara was the culprit phenomenon that I never have seen and discussed with my partner, Danelle Cespedes MD. He never has seen this neither.   The patient returned on 05/09/2022 with no symptoms of anything in particular besides minimal respiratory allergies and pain in the left hip associated with her previous surgery. Her clinical examination today is very much unremarkable, she  looks fantastic. She has no symptoms or signs of breast cancer recurrence. White count, hemoglobin and platelets are normal. Her tumor marker CA 15-3 has remained normal and actually lower than ever and compliance with Aromasin has been 100% with excellent tolerance.   Given the circumstances of this I advised her to remain on Aromasin 25 mg a day and I find no use for this patient to go back onto Kisqali or medicines of this nature.   On 08/16/2022 we reviewed the patient in regard to her history of breast cancer. Clinically she has not had any obvious recurrent disease in the chest wall in the lung anatomy or abdomen or pelvis. She remains on Aromasin adjuvantly and we are waiting to review tumor marker. She has a chest wall that is completely flat due to previous mastectomies and radiation therapy. There is no axillary adenopathies and the patient has in my opinion control of her disease process as far as we can tell. I advised her to remain on Aromasin 25 mg a day. I went ahead and scheduled a visit with us in a few weeks in order to further review radiological analysis that will be discussed below.  On 09/14/2022 the patient has had in the interim a CT scan of the chest, abdomen and pelvis for review personally. Her pulmonary anatomy remains normal, there is no pulmonary congestion, pleural effusions, pericardial effusion, cardiomegaly, hilar or mediastinal adenopathy. There is prepericardiac lymph node measuring almost 1.5 to 2 cm in size that is flat like a small finger that has been present before and has minimally enlarged. If this has anything to do with her previous history of breast cancer is difficult to state but this has been evaluated before with similarity in regards size and some degree of fluctuation. I think this does not constrain me from thinking with a normal tumor marker of CA 15-3 of 20 during the previous visit or compel to perform either removal or biopsy at this time or proceed with  radiological assessment with a PET scan. I do believe that this is not representation of anything in particular. Her liver anatomy and the rest of the bony anatomy, pancreas, kidneys, spleen and retroperitoneum remain unchanged. Given this finding I advised the patient to proceed and continue her Aromasin without the need to add any other medication to her regimen of medication especially in the background of A-fib. I advised her to proceed with reassessment with me in 6 weeks with a repeat CBC, CMP and CA 15-3. In that moment also we will obtain a liquid biopsy for further analysis. Her liver function test has remained normal and Aromasin has not produced any chemical hepatitis. I advised her again to remain on this medicine by itself.   On 10/25/2022 the patient remains on Aromasin. She has no symptoms or signs that would indicate breast cancer recurrence and the latest tumor marker CA 15-3 was 20 two months ago. We have another one pending today. Given these circumstances I advised her to remain on Aromasin for the time being. I find no need for radiological assessment at this time. She will be called at home with the report of her tumor marker.   On 12/28/2022 the patient had no symptoms or signs of breast cancer progression or recurrence on any level. Tolerance to Aromasin is excellent with no side effects and she has 100% compliance on the medication. She was advised to remain on the medication. Her tumor marker CA 15-3 has remained normal. No plans for radiological assessment unless new clinical changes occur or tumor marker changes.   On 03/29/2023 the patient has no symptoms or signs of breast cancer recurrence. She continues taking her aromatase inhibitor medicine on a routine basis with 100% compliance and no significant side effects from the medication. Clinically she has no evidence of breast cancer recurrence and during the previous visit her CA 15-3 remained normal, another one is pending today that  will be discussed with her on the telephone once it becomes available. Given the stability of her clinical picture and the excellent clinical status I advised the patient to remain on her medicine for the time being returning to see us back every 3 months with a CBC, CMP and CA 15-3. I find no need for radiological assessment on her at this point.   6/27/2023: Patient reviewed back today in office. She does not have any clinical findings suggestive of malignancy reoccurrence. She remains on Aromasin daily. Labs reviewed. CBC and CMP are both normal. CA 15-3 is normal at 18.5. Patient encouraged to become more active to help with weight gain and arthritis.   On 09/27/2023 the patient was further reviewed. She has no symptoms or signs of breast cancer recurrence. Her chest wall is unremarkable, the lymphedema in the left upper extremity is a grade 1 at the most controlled and she has no need for any elastic support. She has not developed any cellulitis or infection in the left upper extremity. The patient is taking Aromasin adjuvantly 100% compliance, no evidence of recurrent disease. Normal white count, hemoglobin and platelets pending chemistry profile. We advised her to remain on Aromasin 25 mg a day. She will be returning to see us back in 3 months with repeat CBC, CMP and CA 15-3. No need for radiological assessment on her at this point.    On 12/28/2023 no symptoms or signs of breast cancer recurrence. My conversation with, Danni Odell MD, today took place in regard to the potentiality for pleuritic pain on the right side. Lung auscultation is normal and the patient has no symptoms. This pain comes and goes very sporadically and is not bothering her on routine basis. Knowing that she has had a minimal pericardial alteration before we opted to proceed with a CT angiogram of the chest that will be done tomorrow and we will review this with the patient at some point on the telephone in the next 48 hours or so.     If  we assume that her cancer marker CA 15-3 remains stable the patient will remain on Aromasin and we will review her radiological analysis again. Otherwise plan to review her back in 3 months with repeat CBC, CMP and CA 15-3.     On 01/18/2024, the patient was brought to the office to discuss the findings of the pathological report of her retrosternal mass that documents carcinoma of the breast, metastatic, ER positive, CT positive, HER2 negative. Further analysis of this by Temyusufus has been requested.     On this basis, I do believe that the patient has now officially metastatic breast cancer to different sites, specifically inside of the chest. I do not see any options in regard to surgical resection of this, neither radiation therapy because the heart is behind the tumoral site. Radiation therapy will be highly damaging to her heart, especially receiving anthracycline in the past. She had received Kisqali in the past with tremendous benefit and we will proceed with the management of this medicine at the dose of 200 mg twice a day. In anticipation of the use of this, the patient will have a magnesium supplement every day and some cashews every day and she will proceed with an EKG today to measure QT interval. This will be checked back in a couple of weeks.     The patient will require laboratory assessment every 2 weeks to monitor white count, hemoglobin and platelets in regard to Kisqali utilization.     I went ahead and requested a PET scan to be done as early as next week. The patient will have formal education and consent for Kisqali and the medicine will be delivered to her house to initiate this at some point probably in the middle of next week.     The patient will have the medication utilization at least every 6-8 weeks, repeat radiological assessment then and then decide how to proceed. We now know that her tumor marker is not useful to follow up on her disease process.      2. Left hip pain.   05/17/2021:  Patient reports new complaint of progressive discomfort and the inability to function given the pain in the left hip area. Now she is limping. The only time when she is not in discomfort with the left hip is when she is sitting or lying in bed or riding the horse when gravity takes away the pressure of her left hip area. Radiologically speaking the CT scan of the chest, abdomen and pelvis to my eyes disclosed no abnormalities besides a very simple ovarian cyst that measures close to 7 x 5 cm with no trabeculation. There is no pelvic ascites. There is no pelvic adenopathy. The other abnormality obviously visible is the severe degree of scoliosis of the thoracic, lumbar spines.   It caught my attention the subchondral cyst in the left hip and the minimal if any space leftover between the head of the femur and the acetabulum. There is no metastasis in this anatomical site.   The radiologist mentioned cardiophrenic angle lymphadenopathy. I cannot see that from my naked eyes. I do not know what it is and I do not know how to express it. Pulmonary anatomy is normal. Hilar adenopathy is normal. No pericardial effusion. No pleural effusion. Minimal infiltrate in the lungs related to radiation changes. Liver anatomy, pancreas and kidneys were unremarkable. The scoliosis is very obvious in the view of the abdomen.   Patient refereed with Dr. Leonard, orthopedic surgeon.   Patient underwent left hip replacement and recovered well.   On 09/27/2023 her left hip pain is very minimal at this time, she is not limping. 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 Her osteoarthritis in her hands is still a prevalent issue with aches and pains periodically. No need for pain medicine at this time besides Tylenol.     On 12/28/2023 she has arthritic pain in multiple sites in her back, in her hips, in her knees, in her hands. She takes now as per my advice Tylenol  and I told her absolutely no antiinflammatory medications whatsoever.     On 01/18/2024, no issues pertinent.      3. Ovarian cyst  05/17/2021: Her CA 15-3 was minimal elevated in comparison to previous assessment and pelvic ultrasound ordered. t raises the following question.   05/24/2021: This is also specific in regard to the ovaries and actually an ultrasound of her vagina looking into the ovaries documented an unilocular cyst on the left side with typical features of benignity and a  was completely normal 5.5.   6/27/2023: CA 15-3 is normal at 18.5.  On 09/27/2023 no issues pertinent to this. This will remain in observation.    On 12/28/2023 this is asymptomatic. No issues pertinent to this at this time.     On 01/18/2024, no issues pertinent.      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   Her liver enzymes are completely normal today. She remains on Aromasin and obviously this patient never will be back on letrozole under any circumstances. This was documented through liver biopsy and through removal of the medication that triggered quick improvement in her liver function test.   On 09/14/2022 there is no evidence of drug induced hepatitis. Aromasin will be continued. The patient is aware that she never will be able to take Femara.   On 10/25/2022 her liver enzymes are completely normal today. Since discontinuation of Femara her drug induced hepatitis has resolved. This situation was extremely rare.   ON 12/28/2022 no issues pertinent to liver function after discontinuation of Femara months ago.   On 03/29/2023 waiting to review her liver function. All of the abnormalities resolved after stopping letrozole.   6/27/2023: Liver enzymes remain normal.   No issues pertinent to this. She remains is observation.    On 12/28/2023 her liver function test is completely normal today. Aromasin is not producing any issues.    On 01/18/2024, no issues pertinent.      5. Septic Arthritis  The patient has developed an  episode of septic arthritis in many joints including left shoulder and left ankle. She required antibiotic therapy as per recently. Infectious Disease was involved in this. In fact I discussed the case with them on the telephone. I suggested choosing the PICC line to be placed on the right arm in order to be able to deliver antibiotic therapy according to the proper indication. She completed the PICC line and it was removed last week with no complications or problems. The right upper extremity is completely normal. It is difficult for me to know why she developed the septic arthritis. She is in contact with horses all the time. The pathogen that she had in the joint is very uncommon in my opinion and the patient had no obvious source including no sinuses, no dental source, no obvious cardiac source, no gastrointestinal or genitourinary source and no skin source. It raises the question if this pathogen could evade to colonic source and I think I feel the obligation for this patient to have at least a CT scan of the abdomen and pelvis and eventually in several months from now consider a colonoscopy. Another consideration could be a Cologuard in some way. At least the patient’s infection seems to be under control. Her joints are still sore today including left shoulder, left ankle. Local inflammatory signs are very much gone. The only area of inflammation that she has in the 1st MP joint on the left hand probably represents osteoarthritis, no more than that. She will remain in observation from this point of view.  On 09/14/2022 the patient has no symptoms that would suggest any further spreading or new development of septic arthritis. My fear was related to the possible seeding of her hip replacement areas by bacteria during the bacteremia that she had not too long ago. It seems that that is not the case. Radiologically speaking I do not see any local inflammation or reaction in any of these anatomical sites. There is  perfect symmetry in the hip areas in regard to all her hardware material. Her sedimentation rate is BACK TO normal. Her white count is normal and this process will be watched in absence of any other intervention.  On 10/25/2022 no new episodes of septic arthritis. I measured her sedimentation rate during the previous visit that was down to 9, previously was in the 8-9 category. This means resolution of an inflammatory process altogether.   On 12/28/2022 at this time she is experiencing her typical symptom of osteoarthritis in her hands and hips but no issues pertinent to septic arthritis whatsoever. Deformities in the hands are ongoing due to osteoarthritis with no other issues or problems. No lymphedema in upper extremities.   On 03/29/2023 no significant sequela from her multiple foci and septic arthritis. What she has now is just typical osteoarthritis symptomatology and she uses Tylenol for this and occasional ibuprofen.   6/27/2023: Patient continues to report intermittent joint discomfort. Continues to manage pain with tylenol and occasional ibuprofen.   No issues pertinent to this.    On 12/28/2023 there is no evidence of recurrence of septic arthritis at this time.    On 01/18/2024, no issues pertinent.        6. Atrial Fibrillation  The patient developed atrial fibrillation with rapid ventricular response during the hospitalization with septic arthritis. Echocardiogram documented very dilated left atrium. She is now receiving Eliquis, metoprolol, and digoxin. Her ventricular response is controlled today but she remains in AFib. She has requested a followup with Dr. Odell and I went ahead and made her an appointment. 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 If she had a fall and she is taking anticoagulants the only thing that we are going to have is just trouble. She has sold one of the horses. She will sell the other  horse very soon and she will remain on land for the time being. Her  is back in town and he is helping her with consecution of groceries and things of this nature under the present circumstances. I advised her to minimize any trauma.  On 09/14/2022 the patient will be seen by Electrophysiology tomorrow in regard to consideration of cardioversion for her AFib. She has discussed this with Dr. Odell and I have reviewed this data. That is perfectly fine from my point of view. I find no form of contraindication from my side of the story if this is the methodology that is chosen to try to revert her to normal sinus rhythm.  On 10/25/2022 the patient is in normal sinus rhythm today. She remains on anticoagulants. She has cardiology appointment tomorrow. I asked her to buy a pulse oximeter and I taught her how to interpret the little wave curve of the pulse oximeter in regard to her heart rate. Typically patients in A-fib have very irregular pulse chaotic and the little wave curves will be continuously changing and besides the pulse rate will be continuously changing depending on the speed of the process. That is very simple to interpret. If se develops that problem I advised her to call her cardiologist.   On 12/28/2022 today she is in normal sinus rhythm by cardiac auscultation. Echocardiogram to be done by Danni Odell MD, in the next few days and further recommendations at that point. We strongly advised the patient about the continuation of Eliquis.   On 03/29/2023 clinically her heart obeys to normal sinus rhythm.   6/27/2023: Patient continues to be followed by Dr. Odell and has a stress test next week with Dr. Goodman. Patient remains on Eliquis with good tolerance.   On 09/27/2023 the patient has been in A-fib since the time that she was seen by, Danni Odell MD, at the time that she had cardiac stress testing. Her A-fib is under control today with medications in regard to rate but she raised the question when, Manjeet  MD Janay, is going to see her. I do not know that question but I will go ahead and request an appointment and send him a note.     On 12/28/2023 the patient is back into atrial fibrillation. Dr. Danni Odell, is controlling ventricular response and the patient is on Eliquis with no embolic phenomenon.     On 01/18/2024, no new issues pertinent to this. She continues being monitored by Dr. Odell.       7. Grade 3 lymphedema in the left upper extremity    I went and made an urgent referral to the Lymphedema Clinic today to see if further bandage and drainage of this and massage would be helpful to her in the long run to control the problem. I still advised her to be extremely careful in regard to any injury or lesions in the skin of the left upper extremity to minimize any potentiality of cellulitis. In the long run if she has any episodes she will need to be back on antibiotics right away.   On 09/14/2022 the patient's lymphedema in the left upper extremity continues. She already has an appointment to be seen by the lymphedema clinic, she will require a new sleeve and massage and interventional therapy for this. She is aware that she needs to avoid any trauma in the left upper extremity to minimize any cellulitis. I strongly believe as posted before that septic arthritis is part of her lymphedema issues.   On 10/25/2022 her lymphedema in the left upper extremity is very much resolved with the sleeve and all of the manipulation that she has had in the lymphedema clinic. I advised her to be very prudent in regard doing any nicking in the skin and damaging the skin pertinent to the left upper extremity to minimize cellulitis and potentiality for further problems.   On 12/28/2022 no lymphedema in either extremity. No need for sleeve use at this time.   On 03/29/2023 the patient has no leftover lymphedema in either extremity.  6/27/2023: Stable.   Lymphedema in the left upper extremity on 09/27/2023 is grade 1 and has not  required any sleeve usage or elastic bandage at this time.     On 12/28/2023 her lymphedema in the left upper extremity is very minimal grade 1 at this point. No secondary.    On 01/18/2024, probably a grade 1 lymphedema in the left upper extremity.        8. Hypertension  In regard to hypertension management I have controlled this before. Now she is taking quite amount of metoprolol. I will give up the management of this and now that she will see Dr. Odell, they will provide the care of this issue. I would not be surprised if the patient in the long run needs to be receiving another blood pressure medication given the fact that her blood pressure is still marginally elevated today. Taking digoxin, I do not think Lasix or hydrochlorothiazide will be a good choice because of the potential for hypokalemia unless the potassium is replaced and monitored very close. This will probably minimize the potentiality for digoxin toxicity.  On 09/14/2022 her blood pressure is controlled with the metoprolol that is provided by the cardiology team. She was advised again to avoid antiinflammatory medication for pain control.   On 10/25/2022 her blood pressure is under good control and I advised her to continue taking her blood pressure medication and once more I advised against the use of any antiinflammatory medication by oral route.   On 12/28/2022 blood pressure under good control, medications will remain the same.  On 03/29/2023 her blood pressure remains under excellent control.   6/27/2023: Continues to be followed by Cardiology.   On 09/27/2023 her blood pressure is under good control.    On 12/28/2023 her blood pressure has been spiking including to a 190 systolic today in the office of, Danni Odell MD. Amlodipine was given. Her blood pressure today here is going down. The patient is eating exaggerated amount of salt and on top of that she is taking antiinflammatory nonsteroidal medications at least 6 times a week, all of this  in conjunction with raised blood pressure. I told her one more time she cannot take antiinflammatory medication, she can only take Tylenol. Dr. Danni Odell, will be adjusting her blood pressure medicine accordingly.     On 01/18/2024, proper blood pressure control at this time.      9. Insomnia  The problem is what to provide her for this problem at this time. There are cardiac issues. I do believe that this patient will be better off at least for the next 6 weeks taking a benzodiazapine or something of this nature more than any other medication. I do not feel compelled to give her gabapentin that actually has not worked for her in the past. Therefore I went ahead and prescribed for her medication in this regard today to take it at bedtime to see if this allows her to sleep properly.   On 10/25/2022 the patient persists having insomnia not sleeping more than 3 hours taking Ambien. I went ahead and discontinued this medicine. I gave her Seroquel 25 mg tablets to take 1 orally nightly. I asked her to call my nurse Priya Gonzalez next week and let us know how she is doing in that arena.   6/27/2023: Patient is no longer on Seroquel. Insomnia is mildly improved.     On 09/27/2023 the patient is no longer requiring any insomnia medication.    On 12/28/2023 not requiring any insomnia medication.     On 01/18/2024, not requiring any medicine for this at this time.      Plan:  1. The plan of care has been clearly established in the numerals above. The patient will be reviewed in 2 weeks. She will proceed with a PET scan in days. The patient will initiate Kisqali in a few days. The dose will be 200 mg twice a day.   2. The patient will require clinical reassessment in 2, 4, and 6 weeks as well as radiological assessment probably 2 months into Kisqali utilization. This will require I will say a CT scan of the chest for that purpose.     We will review the [facility] analysis once that this becomes available in several weeks from  now. If any other options of therapy will be available there, we will be glad to discuss this further with the patient.     Discussed with the patient in the room.        Patient remains on high risk medication and requires close monitoring for toxicity.

## 2024-01-19 ENCOUNTER — TELEPHONE (OUTPATIENT)
Dept: CARDIOLOGY | Facility: CLINIC | Age: 64
End: 2024-01-19
Payer: MEDICARE

## 2024-01-19 LAB
LAB AP CASE REPORT: NORMAL
LAB AP CLINICAL INFORMATION: NORMAL
LAB AP DIAGNOSIS COMMENT: NORMAL
LAB AP SPECIAL STAINS: NORMAL
LAB AP SYNOPTIC CHECKLIST: NORMAL
PATH REPORT.ADDENDUM SPEC: NORMAL
PATH REPORT.FINAL DX SPEC: NORMAL
PATH REPORT.GROSS SPEC: NORMAL

## 2024-01-19 NOTE — TELEPHONE ENCOUNTER
She is to start to Kisquali 200 mg twice daily next week.  Dr. Dixon plans to check EKG 2 weeks after starting medication.  She had EKG in the office with him yesterday that was over read by Dr. Loving with QTc 407 ms.  She is on metoprolol succinate 100 mg every morning and 50 mg every afternoon.  Any changes from our standpoint?  Her appointment for now is in July, does she need to be seen sooner?

## 2024-01-21 NOTE — TELEPHONE ENCOUNTER
K and Mg were ok. Nothing to change for now. If QTc increases, can change metoprolol to Nadolol. Wait on the EKG  2 weeks after chemo

## 2024-01-23 ENCOUNTER — HOSPITAL ENCOUNTER (OUTPATIENT)
Dept: PET IMAGING | Facility: HOSPITAL | Age: 64
Discharge: HOME | End: 2024-01-23
Payer: MEDICARE

## 2024-01-23 ENCOUNTER — SPECIALTY PHARMACY (OUTPATIENT)
Dept: PHARMACY | Facility: HOSPITAL | Age: 64
End: 2024-01-23
Payer: MEDICARE

## 2024-01-23 ENCOUNTER — SPECIALTY PHARMACY (OUTPATIENT)
Dept: ONCOLOGY | Facility: HOSPITAL | Age: 64
End: 2024-01-23
Payer: MEDICARE

## 2024-01-23 ENCOUNTER — TELEMEDICINE (OUTPATIENT)
Dept: ONCOLOGY | Facility: CLINIC | Age: 64
End: 2024-01-23
Payer: MEDICARE

## 2024-01-23 VITALS
WEIGHT: 175.6 LBS | SYSTOLIC BLOOD PRESSURE: 131 MMHG | HEART RATE: 86 BPM | DIASTOLIC BLOOD PRESSURE: 94 MMHG | TEMPERATURE: 98.6 F | HEIGHT: 65 IN | RESPIRATION RATE: 16 BRPM | OXYGEN SATURATION: 98 % | BODY MASS INDEX: 29.26 KG/M2

## 2024-01-23 DIAGNOSIS — C50.812 MALIGNANT NEOPLASM OF OVERLAPPING SITES OF LEFT BREAST IN FEMALE, ESTROGEN RECEPTOR POSITIVE: ICD-10-CM

## 2024-01-23 DIAGNOSIS — C50.812 MALIGNANT NEOPLASM OF OVERLAPPING SITES OF LEFT BREAST IN FEMALE, ESTROGEN RECEPTOR POSITIVE: Primary | ICD-10-CM

## 2024-01-23 DIAGNOSIS — Z17.0 MALIGNANT NEOPLASM OF OVERLAPPING SITES OF LEFT BREAST IN FEMALE, ESTROGEN RECEPTOR POSITIVE: Primary | ICD-10-CM

## 2024-01-23 DIAGNOSIS — Z17.0 MALIGNANT NEOPLASM OF OVERLAPPING SITES OF LEFT BREAST IN FEMALE, ESTROGEN RECEPTOR POSITIVE: ICD-10-CM

## 2024-01-23 LAB — GLUCOSE BLDC GLUCOMTR-MCNC: 90 MG/DL (ref 70–130)

## 2024-01-23 PROCEDURE — 1160F RVW MEDS BY RX/DR IN RCRD: CPT | Performed by: NURSE PRACTITIONER

## 2024-01-23 PROCEDURE — 3080F DIAST BP >= 90 MM HG: CPT | Performed by: NURSE PRACTITIONER

## 2024-01-23 PROCEDURE — 1159F MED LIST DOCD IN RCRD: CPT | Performed by: NURSE PRACTITIONER

## 2024-01-23 PROCEDURE — 0 FLUDEOXYGLUCOSE F18 SOLUTION: Performed by: INTERNAL MEDICINE

## 2024-01-23 PROCEDURE — A9552 F18 FDG: HCPCS | Performed by: INTERNAL MEDICINE

## 2024-01-23 PROCEDURE — 1126F AMNT PAIN NOTED NONE PRSNT: CPT | Performed by: NURSE PRACTITIONER

## 2024-01-23 PROCEDURE — 82948 REAGENT STRIP/BLOOD GLUCOSE: CPT

## 2024-01-23 PROCEDURE — 99212 OFFICE O/P EST SF 10 MIN: CPT | Performed by: NURSE PRACTITIONER

## 2024-01-23 PROCEDURE — 3075F SYST BP GE 130 - 139MM HG: CPT | Performed by: NURSE PRACTITIONER

## 2024-01-23 PROCEDURE — 78815 PET IMAGE W/CT SKULL-THIGH: CPT

## 2024-01-23 RX ADMIN — FLUDEOXYGLUCOSE F 18 1 DOSE: 200 INJECTION, SOLUTION INTRAVENOUS at 06:48

## 2024-01-23 NOTE — PROGRESS NOTES
Oral Chemotherapy - New Referral    Received a referral from Dr. Dixon    Treatment Plan: Kisqali (ribociclib)  Start date of treatment planned for: As soon as oral specialty medication is available.  Indication: breast cancer  Relevant past treatments: kisqali, letrozole  Is the therapy appropriate based on treatment guidelines and FDA labeling?: yes  Therapeutic Goals: Continue treatment until progression or intolerable toxicity  Patient can self-administer oral medications: Yes    Drug-Drug Interactions: The current medication list was reviewed and there are no relevant drug-drug interactions with the specialty medication.  Medication Allergies: The patient has no relevant allergies as it relates to their oral specialty medication  Review of Labs/Dose Adjustments: The patient's most recent labs were reviewed and all are WNL to start treatment at this dose.     A prescription was released to  [facility]  specialty pharmacy for   Drug: Kisqali (ribociclib)  Strength: 200 mg  Directions: Take two tablets by mouth daily for 21 days on then 7 days off  Quantity: 42  Refills: 0    Pharmacy education  completed today    Name/Credentials: Gina Lopez PharmD, BCPS    1/23/2024  11:21 EST

## 2024-01-23 NOTE — PROGRESS NOTES
TREATMENT  PREPARATION    Marylu Georges  [id number]  1960    Chief Complaint: Treatment preparation and needs assessment    History of present illness:  Marylu Georges is a 64 y.o. year old female who is here today for treatment preparation and needs assessment.  The patient has been diagnosed with   Encounter Diagnosis   Name Primary?    Malignant neoplasm of overlapping sites of left breast in female, estrogen receptor positive Yes    and is scheduled to begin treatment with:     Oncology History:    Oncology/Hematology History   Malignant neoplasm of overlapping sites of left breast in female, estrogen receptor positive   1/22/2019 Initial Diagnosis    Malignant neoplasm of overlapping sites of left breast in female, estrogen receptor positive (CMS/HCC)     6/14/2021 -  Chemotherapy    OP BREAST Letrozole / Ribociclib         The current medication list and allergy list were reviewed and reconciled.     Past Medical History, Past Surgical History, Social History, Family History have been reviewed and are without significant changes except as mentioned.    Physical Exam:    Vitals:    01/23/24 0938   BP: 131/94   Pulse: 86   Resp: 16   Temp: 98.6 °F (37 °C)   SpO2: 98%     Vitals:    01/23/24 0938   PainSc: 0-No pain        ECOG score: 0       Limited due to video visit.  Physical Exam  Vitals reviewed.   Eyes:      Conjunctiva/sclera: Conjunctivae normal.      Pupils: Pupils are equal, round, and reactive to light.   Pulmonary:      Effort: Pulmonary effort is normal.   Neurological:      General: No focal deficit present.      Mental Status: She is alert and oriented to person, place, and time. Mental status is at baseline.   Psychiatric:         Mood and Affect: Mood normal.         Behavior: Behavior normal.         Thought Content: Thought content normal.         Judgment: Judgment normal.           NEEDS ASSESSMENTS    Genetics  The patient's new diagnosis and family history have been reviewed  for genetic counseling needs. The patient will not be referred..     Psychosocial and Barriers to care  The patient has completed a PHQ-9 Depression Screening and the Distress Thermometer (DT) today.  PHQ-9 results show PHQ-2 Total Score: 0 PHQ-9 Total Score: PHQ-9 Total Score: 0     The patient scored their distress today as Distress Level: 0-No distress on a scale of 0-10 with 0 being no distress and 10 being extreme distress. Problems marked by the patient as being an issue for them within the last week include Physical concerns  Pain: Yes  Sleep: Yes .      Results were reviewed along with psychosocial resources offered by our cancer center.  Our Supportive Oncology team will be flagged for a score of 4 or above, and a same day call will be made for a score of 9 or 10.  A mental health referral is offered at that time. Patients who score less than 4 have been educated on our support services and can be referred to our  upon request.  The patient will not be referred to our .       Nutrition  The patient has completed the malnutrition screening today. They scored Malnutrition Screening Tool  Have you recently lost weight without trying?  If yes, how much weight have you lost?: 0--> No  Have you been eating poorly because of a decreased appetite?: 0--> No  MST score: 0   with a score of 0-1 meaning not at risk in a score of 2 or greater meaning at risk.  Patients with a score of 3 or higher will be referred to our oncology dietitian for support. Patients beginning at risk treatment regimens or who have dietary concerns will also be referred to our oncology dietitian. The patient will not be referred.    Functional Assessment  Persons who are age 70 or greater will be screened for qualification of a comprehensive geriatric assessment by our survivorship nurse practitioner.  Older adults with cancer face unique challenges. These may include an increased risk of drug reactions, financial  "burdens, and caregiver stress. The patient scored   . Patients scoring 14 or lower will referred for an older adult functional assessment with the survivorship advanced practice registered nurse to ensure all needed support is provided as patients plan for their treatments. NOT APPLICABLE    Intravenous Access Assessment  The patient and I discussed planned intravenous chemo/biotherapy as well as other IV treatments that are often needed throughout the course of treatment. These may include, but are not limited to blood transfusions, antibiotics, and IV hydration. Discussed that depending on selected treatment and vein assessment, patient may require venous access device (VAD) which could include but not limited to a Mediport or PICC line. Risks and benefits of VADs reviewed. The patient will be treated via Oral Treatment.    Reproductive/Sexual Activity   People should avoid becoming pregnant and should not get a partner pregnant while undergoing chemo/biotherapy.  People of childbearing age should use effective contraception during active therapy. The best recommendation for all people is to use a barrier method for a minimum of 1 week after the last infusion of chemo/biotherapy to prevent your partner being exposed to byproducts from treatment medications in bodily fluids. Effective contraception should be discussed with your oncology team to make sure it is safe to take based on your diagnosis. Possible options include oral contraceptives, barrier methods. Chemo/biotherapy can change your ability to reproduce children in the future.  There are options for fertility preservation. The patient will not be referred.    Advanced Care Planning  Advance Care Planning   The patient and I discussed advanced care planning, \"Conversations that Matter\".   This service is offered for development of advance directives with a certified ACP facilitator.  The patient does have an up-to-date advanced directive. This document is " on file with our office. The patient is not interested in an appointment with one of our facilitators to create or update their advanced directives.    Have you reviewed your Advance Directive and is it valid for this stay?: Yes  Patient Requests Assistance on Advance Directives: Patient Declined          Smoking cessation  Tobacco Use: Low Risk  (1/23/2024)    Patient History     Smoking Tobacco Use: Never     Smokeless Tobacco Use: Never     Passive Exposure: Not on file       Patient and I discussed their tobacco use history. Referral will not be made for smoking cessation.      Palliative Care  When appropriate, the patient and I discussed the availability palliative care services and when appropriate Hospice care. Palliative care is not the same as Hospice care which was explained to the patient.The patient is not interested in additional information from our  on these services.    Survivorship   When appropriate, we discussed that we will refer the patient to survivorship clinic to discuss next steps following completion of planned treatment.  Reviewed this visit will include assessment of your physical, psychological, functional, and spiritual needs as a survivor and the need at attend this visit when scheduled.    Assessment and Plan:    Diagnoses and all orders for this visit:    1. Malignant neoplasm of overlapping sites of left breast in female, estrogen receptor positive (Primary)      No orders of the defined types were placed in this encounter.    The patient and I have reviewed their diagnosis and scheduled treatment plan. Needs assessment was completed where applicable including genetics, psychosocial needs, barriers to care, VAD evaluation, advanced care planning, survivorship, and palliative care services where indicated. Referrals have been ordered as appropriate based upon evaluation today and patient desires.   Chemo/biotherapy teaching was completed today and consent obtained. See  separate documentation for further details.  Adequate time was given to answer questions.  Patient made aware of their care team members and contact information if they have questions or problems throughout the treatment course.  Discussion held and written information provided describing frequency of office visits and ongoing monitoring throughout the treatment plan.     Reviewed with patient any prescribed medication sent to pharmacy.  Education provided regarding proper storage, safe handling, and proper disposal of unused medication.  Proper handling of body fluids and waste discussed and written information provided.  If appropriate, patient had pretreatment labs drawn today.    Learning assessment completed at initial patient encounter. See separate flowsheet. Chemo/biotherapy education comprehension assessed at today's visit.    I spent 7 minutes caring for Marylu on this date of service. This time includes time spent by me in the following activities: preparing for the visit, obtaining and/or reviewing a separately obtained history, performing a medically appropriate examination and/or evaluation, counseling and educating the patient/family/caregiver, documenting information in the medical record, and care coordination.     You have chosen to receive care through a telehealth visit.  Do you consent to use a video/audio connection for your medical care today? Yes    Carmina Lambert, APRN   01/23/24

## 2024-01-23 NOTE — PROGRESS NOTES
Drug: Kisqali (ribociclib)  Strength: 200 mg  Directions: Take two tablets by mouth daily for 21 days on then 7 days off  Quantity: 42  Refills: 0    Released to pharmacy: [facility]    Completed independent double check on medication order/RX.  Kirill Merchant, DaylinD, BCOP  Clinical Oncology Pharmacist

## 2024-01-23 NOTE — PROGRESS NOTES
Specialty Pharmacy Initial Fill Coordination Note     Marylu is a 64 y.o. female contacted today regarding  first fill of Kisqali specialty medication(s).    Reviewed and verified with patient:       Specialty medication(s) and dose(s) confirmed: yes  Kisqali 200 mg tabs-2 tabs daily for 21 days on then 7 days off.    Delivery Questions      Flowsheet Row Most Recent Value   Delivery method Beeline  [Ship to [facility] Office-Ship 1/24 for jessica 1/25-$0 copay with FREE Month Voucher]   Delivery address verified with patient/caregiver? Yes  [Ship to [facility]]   Delivery address Prescriptions   Number of medications in delivery 1   Medication(s) being filled and delivered Ribociclib Succinate   Doses left of specialty medications 0-New Start   Copay verified? Yes   Copay amount $0 copay first fill with Free Month Voucher   Copay form of payment No copayment ($0)              Medication Adherence          Adherence tools used: directed education      Support network for adherence: healthcare provider             Kisqali delivery coordinated to the [facility] office with pt for her to  on 1/25/2024. $0 copay with FREE month voucher. This is her first fill of the medication. Pt had education with [facility]TM Pharmacist today.      Follow-up: 21 day(s)     Estelle Emmanuel, Pharmacy Technician  Specialty Pharmacy Technician

## 2024-01-23 NOTE — PROGRESS NOTES
Specialty Pharmacy Patient Management Program  Oncology Initial Assessment       Marylu Georges is a 64 y.o. female with breast cancer seen by an Oncology provider and enrolled in the Oncology Patient Management program offered by [facility].  An initial outreach was conducted, including assessment of therapy appropriateness and specialty medication education for Kisqali (ribociclib). The patient was introduced to services offered by [facility], including: regular assessments, refill coordination, mail order delivery options, prior authorization maintenance, and financial assistance programs as applicable. The patient was also provided with contact information for the pharmacy team.     Goal of chemotherapy: disease control    Treatment Medication(s) / Frequency and Dosing    Kisqali 400 mg po daily 21/28 days    Number of cycles: until disease progression or unacceptable toxicity    Start date of oral specialty medication: As soon as oral specialty medication is available.    Follow-up Testing to be determined after TBD cycles by MD.     Items for home use: Imodium AD (for diarrhea)    Rx written for: [] Nausea    [] Pre-Chemo   Patient states she has medication at home for nausea      Completing Pharmacist: Gina Lopez RPH             Date/time: 01/23/2024 11:09 EST         Relevant Past Medical History, Comorbidities, and Vaccines  Relevant medical history and concomitant health conditions were discussed with the patient. The patient's chart has been reviewed for relevant past medical history and comorbid health conditions and updated as necessary.  Vaccines are coordinated by the patient's oncologist and primary care provider.  Past Medical History:   Diagnosis Date    Anemia in neoplastic disease     Arthritis     Arthritis of back     Arthritis of neck     Breast cancer     Left    CVA (cerebral vascular accident)     Encounter for long-term (current) use  of other medications 06/22/2021    Fracture, fibula     Fracture, finger     Frozen shoulder     Hip arthrosis 01/17    Hip pain     RIGHT HIP... CYST    History of fracture of leg 1987    History of radiation therapy     LAST TREATMENT      Hypertension     Knee swelling     Limited joint range of motion (ROM)     RIGHT HIP    Low back strain     Periarthritis of shoulder     Rotator cuff syndrome 6/22    Skin sore     OPEN SORE LEFT BREAST    Syncope     Vertigo      Social History     Socioeconomic History    Marital status:      Spouse name: Cruz    Number of children: 0   Tobacco Use    Smoking status: Never    Smokeless tobacco: Never   Vaping Use    Vaping Use: Never used   Substance and Sexual Activity    Alcohol use: Yes     Comment: occasional    Drug use: No    Sexual activity: Not Currently     Partners: Male     Birth control/protection: Abstinence       Allergies  Known allergies and reactions were discussed with the patient. The patient's chart has been reviewed for allergy information and updated as necessary.   Allergies   Allergen Reactions    Benadryl [Diphenhydramine] Itching     INCREASES BLOOD PRESSURE    Erythromycin GI Intolerance    Levaquin [Levofloxacin] GI Intolerance    Penicillins GI Intolerance        Current Medication List  This medication list has been reviewed with the patient and evaluated for any interactions or necessary modifications/recommendations, and updated to include all prescription medications, OTC medications, and supplements the patient is currently taking.  This list reflects what is contained in the patient's profile, which has also been marked as reviewed to communicate to other providers it is the most up to date version of the patient's current medication therapy.   Prior to Admission medications    Medication Sig Start Date End Date Taking? Authorizing Provider   acetaminophen (TYLENOL) 325 MG tablet Take 2 tablets by mouth Every 6 (Six) Hours As  Needed. 7/22/22   Chante Squires MD   amLODIPine (NORVASC) 2.5 MG tablet Take 1 tablet by mouth Daily.  Patient not taking: Reported on 1/23/2024 12/28/23   Danni Odell MD   apixaban (ELIQUIS) 5 MG tablet tablet Take 1 tablet by mouth Every 12 (Twelve) Hours. Indications: Atrial Fibrillation 8/7/23   Manjeet Gustafson MD   doxylamine (UNISOM) 25 MG tablet Take 1 tablet by mouth At Night As Needed for Sleep.    Chante Squires MD   exemestane (AROMASIN) 25 MG tablet TAKE 1 TABLET BY MOUTH DAILY 10/9/23   Elie Dixon MD   metoprolol succinate XL (TOPROL-XL) 50 MG 24 hr tablet Take 2 tablets po q am and one tablet po q pm 12/28/23   Danni Odell MD   polyethylene glycol 17 g packet Take 17 g by mouth 2 (Two) Times a Day As Needed (constipation). 10/11/23   Manjeet Gustafson MD   ribociclib succinate (Kisqali, 400 MG Dose,) 200 MG tablet therapy pack tablet Take 2 tablets by mouth Daily. Take for 21 days on then 7 days off 1/23/24   Elie Dixon MD   tiZANidine (ZANAFLEX) 4 MG tablet Take 1 tablet by mouth Every 6 (Six) Hours As Needed for Muscle Spasms. 7/13/22   Hansel Colunga MD   Cholecalciferol 250 MCG (10354 UT) tablet Take 1 tablet by mouth. As directed  Patient not taking: Reported on 1/3/2024  1/3/24  Chante Squires MD   digoxin (LANOXIN) 125 MCG tablet Take 1 tablet by mouth Daily. 7/13/22 9/21/22  Hansel Colunga MD   metoprolol succinate XL (TOPROL-XL) 50 MG 24 hr tablet TAKE ONE TABLET BY MOUTH TWICE A DAY 10/4/23 12/28/23  Amador Polanco APRN   metoprolol succinate XL (TOPROL-XL) 50 MG 24 hr tablet Take 2 tablets po q am and one tablet po q pm 12/28/23 12/28/23  Danni Odell MD       Drug Interactions  Reviewed concomitant medications, allergies, labs, comorbidities/medical history, quality of life, and immunization history.   Drug-drug interactions noted and discussed during education:  category C interaction with amlodpine per LexiComp- Kisqali may increase the  serum concentration of amlodpine- monitor therapy is recommended . Reminded the patient to let us know before making any changes or starting any new prescription or OTC medications so we can first assess drug interactions.  Drug-food interactions noted and discussed during education: Patient was instructed to avoid eating grapefruit and drinking grapefruit juice and eating pomegranate and drinking pomegranate juice    Relevant Laboratory Values  Lab Results   Component Value Date    GLUCOSE 110 (H) 01/18/2024    CALCIUM 9.8 01/18/2024     01/18/2024    K 4.4 01/18/2024    CO2 28.2 01/18/2024     01/18/2024    BUN 21 01/18/2024    CREATININE 0.94 01/18/2024    EGFRIFNONA 63 01/25/2022    BCR 22.3 01/18/2024    ANIONGAP 8.8 01/18/2024     Lab Results   Component Value Date    WBC 4.58 01/18/2024    RBC 4.58 01/18/2024    HGB 13.9 01/18/2024    HCT 43.5 01/18/2024    MCV 95.0 01/18/2024    MCH 30.3 01/18/2024    MCHC 32.0 01/18/2024    RDW 14.2 01/18/2024    RDWSD 49.4 01/18/2024    MPV 8.6 01/18/2024     01/18/2024    NEUTRORELPCT 64.4 01/18/2024    LYMPHORELPCT 24.5 01/18/2024    MONORELPCT 8.1 01/18/2024    EOSRELPCT 1.7 01/18/2024    BASORELPCT 1.1 01/18/2024    AUTOIGPER 0.2 01/18/2024    NEUTROABS 2.95 01/18/2024    LYMPHSABS 1.12 01/18/2024    MONOSABS 0.37 01/18/2024    EOSABS 0.08 01/18/2024    BASOSABS 0.05 01/18/2024    AUTOIGNUM 0.01 01/18/2024    NRBC 0.0 01/18/2024       The above labs have been reviewed. No dose adjustments are needed for the oral specialty medication(s) based on the labs.    Initial Education Provided for Specialty Medication  The patient has been provided with the following education. All questions and concerns have been addressed prior to the patient receiving the medication, and the patient has verbalized understanding of the education and any materials provided.  Additional patient education shall be provided and documented upon request by the patient, provider or  payer.      Provided patient with:   Education sheets about the medication, 24-hour clinic phone number and my contact information and instructions to call should additional questions arise.     Medication Education Sheets Provided: (select all that apply)  Oral Specialty Medication: Kisqali (ribociclib)    TOPICS COMMENTS   Storage and Handling of Oral Specialty Medication Store in the original container, in a dry location out of direct sunlight, and out of reach of children or pets. and Store at room temperature.  Discussed safe handling and what to do with any unused medication.   Administration of Oral Specialty Medication Take with or without food at the same time(s) each day.   Adherence to Oral Specialty Regimen and Handling Missed Doses Patient is likely to have good treatment adherence; reinforced the importance of adherence. Reviewed how to address missed doses and to let us know of any missed doses.   Anemia: role of RBC, cause, s/s, ways to manage, role of transfusion Reviewed the role of RBC and the use of transfusions if hemoglobin decreases too much.  Patient to notify us if they experience shortness of breath, dizziness, or palpitations.  Also let patient know they could feel more tired than usual and to try to stay active, but rest if they need to.    Thrombocytopenia: role of platelet, cause, s/s, ways to prevent bleeding, things to avoid, when to seek help Reviewed the role of platelets in blood clotting and when to call clinic (bloody nose that bleeds for 5 mins despite pressure, a cut that won't stop bleeding despite pressure, gums that bleed excessively with brushing or flossing). Recommended using an electric razor, soft bristle toothbrush, and blowing your nose gently.    Neutropenia: role of WBC, cause, infection precautions, s/s of infection, when to call MD Reviewed the role of WBC, good infection prevention practices, and when to call the clinic (temperature 100.4F, sore throat, burning  urination, etc)   Nutrition and Appetite Changes:  importance of maintaining healthy diet & weight, ways to manage to improve intake, dietary consult, exercise regimen, electrolyte and/or blood glucose abnormalities Decreased Appetite: Discussed the risk of decreased appetite. Recommended eating smaller, more frequent meals. Instructed the patient to contact the clinic if losing weight or having difficulty eating enough to maintain energy level.   Diarrhea: causes, s/s of dehydration, ways to manage, dietary changes, when to call MD Chemotherapy : Discussed the risk of diarrhea. Instructed patient on use OTC loperamide with diarrhea onset, but emphasized the importance of calling the clinic if 4-6 episodes in 24 hours not relieved by OTC loperamide.   Constipation: causes, ways to manage, dietary changes, when to call MD Provided supplementary handout with instructions for use of docusate and other OTC therapies to manage constipation.  Instructed to call us if medications aren't working.   Nausea/Vomiting: cause, use of antiemetics, dietary changes, when to call MD Emetic risk: Minimal  Instructed the patient to take a dose of the PRN medication at the first onset of nausea and if it's not working to call us for additional medications.  Also provided non-drug measures to mitigate nausea.       Alopecia: cause, ways to manage, resources Discussed the possibility of hair loss with the patient. Informed patient that they could request a prescription for a wig if desired and most of the cost is usually covered by insurance. Recommended covering the head with a hat and/or protecting the skin on the head with SPF 30 or higher.        Pain: causes, ways to manage Chemo: Discussed muscle and joint aches/pains with chemotherapy, and recommended the use of OTC pain relief with ibuprofen or acetaminophen if needed.   Skin/Nail Changes: cause, s/s, ways to manage Severe skin reactions may occur. Please contact the office for  any skin rash.       Organ Toxicities: cause, s/s, need for diagnostic tests, labs, when to notify MD Discussed potential effects on organ systems, monitoring, diagnostic tests, labs, and when to notify their MD. Discussed the signs/symptoms of the following: cardiotoxicity, hepatotoxicity, lung changes, and skin changes       Miscellaneous Financial Issues: free voucher for first month. Estelle W to work on copay issues.   Lab Draws:  per MD discretion   Infertility and Sexuality:  causes, fertility preservation options, sexuality changes, ways to manage, importance of birth control Oral Oncology Therapy: Reviewed safe sex practices and the importance of minimizing exposure to body fluids while on oral oncology therapy.   Home Care: how to manage bodily fluids Counseled on management of soiled linens and proper flush technique.  Discussed how to manage all the side effects at home and advised when to contact the MD office         Adherence and Self-Administration  Barriers to Patient Adherence and/or Self-Administration: no  Methods for Supporting Patient Adherence and/or Self-Administration:  not needed at this time  Expected duration of therapy: Until disease progression or intolerable toxicity    Goals of Therapy  Patient Goals of Therapy:   Consistently take medications as prescribed  Patient will adhere to medication regimen  Patient will report any medication side effects to healthcare provider  Clinical Goals:    Goals Addressed Today        Specialty Pharmacy General Goal      Progression free survival            Support patient understanding of medication regimen  Ensure patient knows the pharmacy contact information  Schedule regular follow-up to monitor the treatment serious adverse events  Schedule regular follow-up to confirm medication adherence  Schedule regular follow-up to monitor disease progression or stability    Quality of Life Assessment   The patient's current (pre-therapy) quality of life was  discussed with the patient. The QOL segment of this outreach has been reviewed and updated.   Quality of Life Score: 7/10    Wrap up  Discussed aforementioned material with patient in person, face-to-face, in clinic.   Chemo consents/CCA were signed at today's visit.   Medication availability: patient will receive medication from [facility] pharmacy on: to be coordinated by Estelle ALSTON  Patient expressed understanding.   Patient demonstrates ability to self-administer medication. No barriers to adherence identified.  All questions and concerns addressed.     Reassessment Plan & Follow-Up  Pharmacist to perform regular reassessments no more than (6) months from the previous assessment.  Welcome information and patient satisfaction survey to be sent by retail team with patient's initial fill.  Care Coordinator to set up future refill outreaches, coordinate prescription delivery, and escalate clinical questions to pharmacist.     Additional Plans, Therapy Recommendations or Therapy Problems to Be Addressed: none     Attestation  I attest that the patient was actively involved in and has agreed to the above plan of care.  If the prescribed therapy is at any point deemed not appropriate based on the current or future assessments, a consultation will be initiated with the patient's specialty care provider to determine the best course of action. The revised plan of therapy will be documented along with any additional patient education provided.     Name/Credentials: Gina Lopez PharmD, BCPS    Date and Time: 1/23/2024  11:09 EST

## 2024-01-25 ENCOUNTER — SPECIALTY PHARMACY (OUTPATIENT)
Dept: PHARMACY | Facility: HOSPITAL | Age: 64
End: 2024-01-25
Payer: MEDICARE

## 2024-01-25 NOTE — PROGRESS NOTES
I have received Kisqali from [facility] and placed it in the [employer] at the [facility] Office.    Pt notified medication is here and she will come pick it up.      Estelle Emmanuel, Pharmacy Technician  Specialty Pharmacy Technician

## 2024-01-25 NOTE — PROGRESS NOTES
Kisqali supply from [facility] picked up by pt on 1/25/2024.    Estelle Emmanuel, Pharmacy Technician  Specialty Pharmacy Technician

## 2024-01-29 DIAGNOSIS — C50.812 MALIGNANT NEOPLASM OF OVERLAPPING SITES OF LEFT BREAST IN FEMALE, ESTROGEN RECEPTOR POSITIVE: Primary | ICD-10-CM

## 2024-01-29 DIAGNOSIS — Z17.0 MALIGNANT NEOPLASM OF OVERLAPPING SITES OF LEFT BREAST IN FEMALE, ESTROGEN RECEPTOR POSITIVE: Primary | ICD-10-CM

## 2024-01-31 ENCOUNTER — OFFICE VISIT (OUTPATIENT)
Dept: ONCOLOGY | Facility: CLINIC | Age: 64
End: 2024-01-31
Payer: MEDICARE

## 2024-01-31 ENCOUNTER — APPOINTMENT (OUTPATIENT)
Dept: ONCOLOGY | Facility: HOSPITAL | Age: 64
End: 2024-01-31
Payer: MEDICARE

## 2024-01-31 ENCOUNTER — LAB (OUTPATIENT)
Dept: LAB | Facility: HOSPITAL | Age: 64
End: 2024-01-31
Payer: MEDICARE

## 2024-01-31 VITALS
WEIGHT: 175.5 LBS | HEART RATE: 73 BPM | HEIGHT: 65 IN | RESPIRATION RATE: 18 BRPM | SYSTOLIC BLOOD PRESSURE: 124 MMHG | BODY MASS INDEX: 29.24 KG/M2 | TEMPERATURE: 98.2 F | OXYGEN SATURATION: 96 % | DIASTOLIC BLOOD PRESSURE: 87 MMHG

## 2024-01-31 DIAGNOSIS — C50.811 MALIGNANT NEOPLASM OF OVERLAPPING SITES OF BOTH BREASTS IN FEMALE, ESTROGEN RECEPTOR POSITIVE: Primary | ICD-10-CM

## 2024-01-31 DIAGNOSIS — Z17.0 MALIGNANT NEOPLASM OF OVERLAPPING SITES OF LEFT BREAST IN FEMALE, ESTROGEN RECEPTOR POSITIVE: ICD-10-CM

## 2024-01-31 DIAGNOSIS — Z17.0 MALIGNANT NEOPLASM OF OVERLAPPING SITES OF BOTH BREASTS IN FEMALE, ESTROGEN RECEPTOR POSITIVE: Primary | ICD-10-CM

## 2024-01-31 DIAGNOSIS — C50.812 MALIGNANT NEOPLASM OF OVERLAPPING SITES OF BOTH BREASTS IN FEMALE, ESTROGEN RECEPTOR POSITIVE: Primary | ICD-10-CM

## 2024-01-31 DIAGNOSIS — C50.812 MALIGNANT NEOPLASM OF OVERLAPPING SITES OF LEFT BREAST IN FEMALE, ESTROGEN RECEPTOR POSITIVE: ICD-10-CM

## 2024-01-31 LAB
ALBUMIN SERPL-MCNC: 4.4 G/DL (ref 3.5–5.2)
ALBUMIN/GLOB SERPL: 1.7 G/DL
ALP SERPL-CCNC: 99 U/L (ref 39–117)
ALT SERPL W P-5'-P-CCNC: 70 U/L (ref 1–33)
ANION GAP SERPL CALCULATED.3IONS-SCNC: 8.6 MMOL/L (ref 5–15)
AST SERPL-CCNC: 51 U/L (ref 1–32)
BASOPHILS # BLD AUTO: 0.04 10*3/MM3 (ref 0–0.2)
BASOPHILS NFR BLD AUTO: 1.1 % (ref 0–1.5)
BILIRUB SERPL-MCNC: 0.6 MG/DL (ref 0–1.2)
BUN SERPL-MCNC: 18 MG/DL (ref 8–23)
BUN/CREAT SERPL: 16.2 (ref 7–25)
CALCIUM SPEC-SCNC: 9.8 MG/DL (ref 8.6–10.5)
CHLORIDE SERPL-SCNC: 103 MMOL/L (ref 98–107)
CO2 SERPL-SCNC: 27.4 MMOL/L (ref 22–29)
CREAT SERPL-MCNC: 1.11 MG/DL (ref 0.57–1)
DEPRECATED RDW RBC AUTO: 49.5 FL (ref 37–54)
EGFRCR SERPLBLD CKD-EPI 2021: 54.9 ML/MIN/1.73
EOSINOPHIL # BLD AUTO: 0.12 10*3/MM3 (ref 0–0.4)
EOSINOPHIL NFR BLD AUTO: 3.2 % (ref 0.3–6.2)
ERYTHROCYTE [DISTWIDTH] IN BLOOD BY AUTOMATED COUNT: 14.1 % (ref 12.3–15.4)
GLOBULIN UR ELPH-MCNC: 2.6 GM/DL
GLUCOSE SERPL-MCNC: 94 MG/DL (ref 65–99)
HCT VFR BLD AUTO: 43.6 % (ref 34–46.6)
HGB BLD-MCNC: 13.8 G/DL (ref 12–15.9)
IMM GRANULOCYTES # BLD AUTO: 0 10*3/MM3 (ref 0–0.05)
IMM GRANULOCYTES NFR BLD AUTO: 0 % (ref 0–0.5)
LAB AP CASE REPORT: NORMAL
LAB AP CLINICAL INFORMATION: NORMAL
LAB AP DIAGNOSIS COMMENT: NORMAL
LAB AP SPECIAL STAINS: NORMAL
LAB AP SYNOPTIC CHECKLIST: NORMAL
LYMPHOCYTES # BLD AUTO: 0.92 10*3/MM3 (ref 0.7–3.1)
LYMPHOCYTES NFR BLD AUTO: 24.6 % (ref 19.6–45.3)
Lab: NORMAL
MCH RBC QN AUTO: 30.4 PG (ref 26.6–33)
MCHC RBC AUTO-ENTMCNC: 31.7 G/DL (ref 31.5–35.7)
MCV RBC AUTO: 96 FL (ref 79–97)
MONOCYTES # BLD AUTO: 0.18 10*3/MM3 (ref 0.1–0.9)
MONOCYTES NFR BLD AUTO: 4.8 % (ref 5–12)
NEUTROPHILS NFR BLD AUTO: 2.48 10*3/MM3 (ref 1.7–7)
NEUTROPHILS NFR BLD AUTO: 66.3 % (ref 42.7–76)
NRBC BLD AUTO-RTO: 0 /100 WBC (ref 0–0.2)
PATH REPORT.ADDENDUM SPEC: NORMAL
PATH REPORT.FINAL DX SPEC: NORMAL
PATH REPORT.GROSS SPEC: NORMAL
PLATELET # BLD AUTO: 199 10*3/MM3 (ref 140–450)
PMV BLD AUTO: 8.5 FL (ref 6–12)
POTASSIUM SERPL-SCNC: 4.6 MMOL/L (ref 3.5–5.2)
PROT SERPL-MCNC: 7 G/DL (ref 6–8.5)
RBC # BLD AUTO: 4.54 10*6/MM3 (ref 3.77–5.28)
SODIUM SERPL-SCNC: 139 MMOL/L (ref 136–145)
WBC NRBC COR # BLD AUTO: 3.74 10*3/MM3 (ref 3.4–10.8)

## 2024-01-31 PROCEDURE — 93005 ELECTROCARDIOGRAM TRACING: CPT | Performed by: INTERNAL MEDICINE

## 2024-01-31 PROCEDURE — 36415 COLL VENOUS BLD VENIPUNCTURE: CPT

## 2024-01-31 PROCEDURE — 85025 COMPLETE CBC W/AUTO DIFF WBC: CPT

## 2024-01-31 PROCEDURE — 80053 COMPREHEN METABOLIC PANEL: CPT

## 2024-01-31 NOTE — PROGRESS NOTES
Subjective     REASON FOR FOLLOW UP:  1. 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 R BREAST MASS WITH GIANT R AXILLARY ADENOPATHY.  SHE UNDERWENT DOSE DENSE AC, TAXOL, BILATERAL MASTECTOMIES, DRAMATIC NEAR COMPLETE DE, RADIATION THERAPY AND ADJUVANT FEMARA STOPPED ON 12/21 BECAUSE DRUG INDUCED HEPATITIS, SWITCHED TO AROMASIN 2/22: NO SIDE EFFECTS.    3. FAMILY HISTORY OF BREAST CANCER IN MOTHER AND SISTER, SISTER WAS TESTED FOR BRCA AND WAS NEGATIVE.    4. LEFT OVARIAN CYST , IT HAS BEEN PRESENT FOR LONG TIME BUT IS GETTING LARGER, ASYMPTOMATIC, NEED FOR , PELVIC US AND GYN ONC EVal: no need for any intervention                  5. LEFT HIP PAIN SEVERE ARTHRITIS, REAL NEED FOR LEFT HIP REPLACEMENT: PERFORMED 8/21    6. DRUG INDUCED HEPATITIS,  FINALLY STOPPED FEMARA: DRAMATIC IMPROVEMENT AND RESOLUTION.      History of present illness:    On 12/28/2023 this 63-year-old female returns to the office in company of her brother in order to be reviewed. She has been in the office of, Danni Odell MD, Cardiology this morning and Dr. Odell called me concerned that the patient was having occasional intermittent pleuritic pain in the chest wall anteriorly on the right side in Absence of any pain in the rib cage per se. She has not had any cough, sputum production, hemoptysis or shortness of breath. Her appetite has remained excellent, she has gained weight and she was eating exaggerated amount of salt to the point that her blood pressure is on the rise very dramatically. The patient is back into atrial fibrillation.     She has history of bilateral breast cancers. She was treated according to protocol above, she achieved a complete remission and she remains on Aromasin since Femara triggered very significant drug induced hepatitis. Aromasin is taken on a daily basis 25 mg a day with no side effects. The patient has proper bowel function, proper urination. She admits that she is taking  ibuprofen at least 4-6 tablets a week. This also could be raising her blood pressure.    She is not drinking any alcohol at this time. Besides this no other issues besides her chronic arthritic pain in hips, elbows, hands from time to time. She is functional and capable of taking care of herself.     The patient returned to the office on 01/18/2024 after we have proceeded with radiological intervention including a CT-guided biopsy of a retrosternal mass. The pathology report became available yesterday. We called the patient to bring her to the office today to review this and initiate therapy. This has shown metastatic breast cancer, ER positive, HI positive, HER2/maria eugenia negative. Her CA 15-3 was normal. The patient will require formal education and consent for Kisqali administration. She already has taken it in the past. She never had any problems with the medicine. Besides this the patient will require as well PET scan in days. She has no other new symptomatology and there is no obvious clinical evidence of any other distant metastasis.    On 01/31/2024, the patient was brought back to the office for review after she has taken the first 10 days of Kisqali with no difficulties, no nausea, no vomiting, no rash, no diarrhea. The patient has not encountered any chest pain or shortness of breath. Her appetite has remained excellent. She has proper bowel activity, proper urination. No cardiac irregularity sensation. She remains active at home doing anything that she pleases with no limits. She also remains on her Eliquis with no new vascular events and no clinical bleeding. No evidence of a stroke.          Past Medical History:   Diagnosis Date    Anemia in neoplastic disease     Arthritis     Arthritis of back     Arthritis of neck     Breast cancer     Left    CVA (cerebral vascular accident)     Encounter for long-term (current) use of other medications 06/22/2021    Fracture, fibula     Fracture, finger     Frozen  shoulder     Hip arthrosis 01/17    Hip pain     RIGHT HIP... CYST    History of fracture of leg 1987    History of radiation therapy     LAST TREATMENT      Hypertension     Knee swelling     Limited joint range of motion (ROM)     RIGHT HIP    Low back strain     Periarthritis of shoulder     Rotator cuff syndrome 6/22    Skin sore     OPEN SORE LEFT BREAST    Syncope     Vertigo            Past Surgical History:   Procedure Laterality Date    AXILLARY LYMPH NODE BIOPSY/EXCISION Right     LYMPH NODE UNDER RIGHT ARM-MALIGNANT (DOUBLE MASTECTOMY)    BREAST BIOPSY Left     MALIGNANT    FRACTURE SURGERY  1987    Leg    HARDWARE REMOVAL      INCISION AND DRAINAGE LEG Left 06/30/2022    Procedure: INCISION AND DRAINAGE left ankle;  Surgeon: Kenneth Tran MD;  Location: [facility];  Service: Orthopedics;  Laterality: Left;    JOINT REPLACEMENT Bilateral mar 2020,july 2021    hips    MASTECTOMY W/ SENTINEL NODE BIOPSY Bilateral 09/16/2019    Procedure: BILATERLA MODIFIED RADICAL MASTECTOMY WITH BILATERAL SENTINEL LYMPH NODE BIOPSY;  Surgeon: Joby Barron Jr., MD;  Location: [facility];  Service: General    TOTAL HIP ARTHROPLASTY Right 03/02/2020    Procedure: RIGHT TOTAL HIP ARTHROPLASTY NATALY NAVIGATION;  Surgeon: Luis M Leonard MD;  Location: [facility];  Service: Orthopedics;  Laterality: Right;    TOTAL HIP ARTHROPLASTY Left 07/20/2021    Procedure: Posterior LEFT TOTAL HIP ARTHROPLASTY NATALY NAVIGATION;  Surgeon: Luis M Leonard MD;  Location: [facility];  Service: Orthopedics;  Laterality: Left;    VENOUS ACCESS DEVICE (PORT) INSERTION Right 02/01/2019    Procedure: INSERTION VENOUS ACCESS DEVICE;  Surgeon: Joby Barron Jr., MD;  Location: [facility];  Service: General    VENOUS ACCESS DEVICE (PORT) REMOVAL N/A 10/30/2019    Procedure: Mediport Removal;  Surgeon: Joby Barron Jr., MD;  Location: [facility];  Service: General        Current Outpatient Medications on  File Prior to Visit   Medication Sig Dispense Refill    acetaminophen (TYLENOL) 325 MG tablet Take 2 tablets by mouth Every 6 (Six) Hours As Needed.      amLODIPine (NORVASC) 2.5 MG tablet Take 1 tablet by mouth Daily. 30 tablet 11    apixaban (ELIQUIS) 5 MG tablet tablet Take 1 tablet by mouth Every 12 (Twelve) Hours. Indications: Atrial Fibrillation 60 tablet 5    doxylamine (UNISOM) 25 MG tablet Take 1 tablet by mouth At Night As Needed for Sleep.      exemestane (AROMASIN) 25 MG tablet TAKE 1 TABLET BY MOUTH DAILY 30 tablet 3    metoprolol succinate XL (TOPROL-XL) 50 MG 24 hr tablet Take 2 tablets po q am and one tablet po q pm 270 tablet 3    polyethylene glycol 17 g packet Take 17 g by mouth 2 (Two) Times a Day As Needed (constipation).      ribociclib succinate (Kisqali, 400 MG Dose,) 200 MG tablet therapy pack tablet Take 2 tablets by mouth Daily. Take for 21 days on then 7 days off 42 tablet 0    tiZANidine (ZANAFLEX) 4 MG tablet Take 1 tablet by mouth Every 6 (Six) Hours As Needed for Muscle Spasms.      [DISCONTINUED] digoxin (LANOXIN) 125 MCG tablet Take 1 tablet by mouth Daily.       Current Facility-Administered Medications on File Prior to Visit   Medication Dose Route Frequency Provider Last Rate Last Admin    Chlorhexidine Gluconate Cloth 2 % pads 1 each  1 each Apply externally Take As Directed Luis M Leonard MD            ALLERGIES:    Allergies   Allergen Reactions    Benadryl [Diphenhydramine] Itching     INCREASES BLOOD PRESSURE    Erythromycin GI Intolerance    Levaquin [Levofloxacin] GI Intolerance    Penicillins GI Intolerance        Social History     Socioeconomic History    Marital status:      Spouse name: Cruz    Number of children: 0   Tobacco Use    Smoking status: Never    Smokeless tobacco: Never   Vaping Use    Vaping Use: Never used   Substance and Sexual Activity    Alcohol use: Yes     Comment: occasional    Drug use: No    Sexual activity: Not Currently     Partners:  "Male     Birth control/protection: Abstinence        Family History   Problem Relation Age of Onset    Lung cancer Father     Irritable bowel syndrome Father     Cancer Mother     Breast cancer Mother     Liver disease Mother     Breast cancer Sister 48    Breast cancer Maternal Grandmother     Breast cancer Paternal Grandmother     Breast cancer Maternal Uncle     Malig Hyperthermia Neg Hx             Objective     Vitals:    01/31/24 1256   BP: 124/87   Pulse: 73   Resp: 18   Temp: 98.2 °F (36.8 °C)   TempSrc: Temporal   SpO2: 96%   Weight: 79.6 kg (175 lb 8 oz)   Height: 165.1 cm (65\")   PainSc:   6   PainLoc: Hand             1/31/2024    12:52 PM   Current Status   ECOG score 0     Exam:           GENERAL:  Well-developed, Patient  in no acute distress.   SKIN:  Warm, dry ,NO purpura ,no rash.  HEENT:  Pupils were equal and reactive to light and accomodation, conjunctivae noninjected,  normal visual acuity.   NECK:  Supple with good range of motion; no thyromegaly , no JVD or bruits,.No carotid artery pain, no carotid abnormal pulsation   LYMPHATICS:  No cervical, NO supraclavicular, NO axillary, NO inguinal adenopathies.  CARDIAC   normal rate , regular rhythm, without murmur,NO rubs NO S3 NO S4   LUNGS: normal breath sounds bilateral, no wheezing, NO rhonchi, NO crackles ,NO rubs.  VASCULAR VENOUS: no cyanosis, NO collateral circulation, NO varicosities, NO edema, NO palpable cords, NO pain,NO erythema, NO pigmentation of the skin.  ABDOMEN:  Soft, NO pain,no hepatomegaly, no splenomegaly,no masses, no ascites, no collateral circulation,no distention.  EXTREMITIES  AND SPINE:  No clubbing, no cyanosis ,no deformities , no pain .No kyphosis,  no pain in spine, no pain in ribs , no pain in pelvic bone.  NEUROLOGICAL:  Patient was awake, alert, oriented to time, person and place.  Bilateral mastectomy sites were well healed with telangiectasias due to radiation therapy. No axillary adenopathy. No lymphedema in " either extremity. No pleural rubs. No pericardial rubs.        RECENT LABS:  Hematology Results from last 7 days   Lab Units 01/31/24  1243   WBC 10*3/mm3 3.74   NEUTROS ABS 10*3/mm3 2.48   HEMOGLOBIN g/dL 13.8   HEMATOCRIT % 43.6   PLATELETS 10*3/mm3 199     Results from last 7 days   Lab Units 01/31/24  1243   SODIUM mmol/L 139   POTASSIUM mmol/L 4.6   CHLORIDE mmol/L 103   CO2 mmol/L 27.4   BUN mg/dL 18   CREATININE mg/dL 1.11*   CALCIUM mg/dL 9.8   ALBUMIN g/dL 4.4   BILIRUBIN mg/dL 0.6   ALK PHOS U/L 99   ALT (SGPT) U/L 70*   AST (SGOT) U/L 51*   GLUCOSE mg/dL 94                    Tissue Pathology Exam (01/15/2024 09:50)   Assessment & Plan    1.  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   For at least 4 years, she has had an in crescendo mass in the left breast that has produced a gigantic tumor that was close to 25 cm in size and is replacing most of the anatomy of the left breast. There are areas of ulceration necrosis and bleeding and the mass is fixed to the chest wall. She has abundant amount of collateral circulation in the left anterior chest wall and it caught my attention the lack of any left axillary adenopathy. She has no lymphedema in the left upper extremity. In the right breast while in sitting position, no palpable abnormalities are documented. The right breast skin and nipple are normal. When the patient lies flat, there is a palpable mass at 9 o'clock from the nipple that measures probably 4 cm in size, very indistinct in regard to edges and margins. There was no mobility and no areas of tenderness. She has a giant adenopathy in the right axilla that measures close to 9 cm in size, uniform, no fluctuation, nontender, completely fixed to the axillary wall. There is no compromise of the skin.   Patient completed 4 cycles of neoadjuvant AC on 4/12/2019.    Patient completed neoadjuvant weekly  Taxol x 12 treatments on 7/19/2019.  9/16/2019 bilateral mastectomy by Dr. Joby Barron with axillary lymph node dissection.  No residual malignancy.  10/30/2019 patient's Mediport was removed in anticipation of radiation.  Radiation therapy under the care of Dr. Marmolejo 10/31/2019-1/13/2020 to the right chest wall.  Started on adjuvant Femara 2.5 mg daily 8/20/2019.  9/21/2020 continues on adjuvant Femara, tolerating it quite well.  Some mild hot flashes and mild arthralgias, all which are tolerable.  The patient returned to the office on 05/24/2021. Since the previous visit the patient proceeded with a PET scan and I have reviewed this with her in the PAC system showing chest wall on the left side area of enlightening SUV activity that is almost 3 cm long x 7 mm wide. It is behind the bone. It is very close to the lower aspect of her heart. The reset of the PET scan discloses no activity. This is also specific in regard to the ovaries and actually an ultrasound of her vagina looking into the ovaries documented an unilocular cyst on the left side with typical features of benignity and a  was completely normal 5.5.   The patient was further reviewed on 07/07/2021 after she has continued her Kisqali and completion of the 1st round of the medicine. Today her EKG disclosed a normal QT interval and this has been sent to Cardiology to be reported officially. 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 She has completed her radiation therapy to the epicardial right lymph node with no difficulties or side effects.   The patient was further reviewed on 09/30/2021. Patient has recovered well from her left hip replacement. She completed her Kisqali a week ago. Her white count is low today. The hemoglobin is stable. The platelet count is stable. Elevation in her liver function test, SGOT, SGPT noted. The patient is not drinking any alcohol. She is not taking any cholesterol medicine. She is  not taking any anti-inflammatory medication and leads me to think that Kisqali is the culprit of this. Given these findings we advised the patient to put the Kisqali on hold until we recheck chemistry profile on weekly basis for the next 3 or 4 weeks. We need to settle these numbers down before she continues the Kisqali. She probably will require dose adjustment at some point. Again, her hepatitis A, B and C serologies are negative.   n regard to her history of breast cancer she was reviewed on 11/19/2021. Since the previous visit the patient has had tumor marker CA 15-3 that has dropped to 20. Radiological assessment of her chest and abdomen CT scan that documents resolution of the epicardial lymphadenopathy in the right hemithorax, no pulmonary nodules or metastasis, no pleural effusion and no liver metastasis. No bone metastasis. Based on this information I do believe that the breast cancer is relatively quiet clinically, biochemically and radiologically and I advised her today to resume her Femara at the dose of 2.5 mg a day. The likelihood of Femara producing liver dysfunction is more than remote.   The patient was further reviewed on 12/29/2021. Recent review of her liver function tests a few days ago documented persistent elevation of her SGOT, SGPT, and alkaline phosphatase. She has discontinued most of the medicines and I have no other choice but discontinue the Femara. Since then and actually today is the 1st day in many weeks that the SGOT and SGPT and alkaline phosphatase are improving. The patient actually remains asymptomatic in this regard. Her liver is not enlarged. She has no jaundice. She has no rash, no skin lesions and no other new alterations. That is good news. Her white blood count, hemoglobin and platelets remain stable. It seems that we are getting to the final diagnosis that Femara is the culprit medicine in regard to the hepatitis induced by medication documented pathologically through a  liver biopsy. That is extremely rare. As I posted above, I discussed with all my partners in regard to how many times through their careers prescribing Femara to multiple breast cancers they have seen Femara triggering hepatitis, none of them gave me a positive answer. I reviewed this information in [employer] and it is reported in less than 1% of patients taking this medication. I think we are in front of a rare situation but I want for her to withhold any further Femara treatment for the time being and I want to review her back in 3 weeks. If the liver function tests continue improving or normalize by then maybe we will have the answer once and for all. Raises the question what we will do next and I think the next medicine will be the utilization of Aromasin that has a different chemical component and different methodology for action. I made her aware of that. We are not going to go back in giving her Kisqali under the present circumstances given the confusion and all the issues pertinent to her liver dysfunction.   The patient was further reviewed on 03/07/2022. Since the previous visit the patient has stopped the Femara in 12/2021 and today her liver function tests are completely back to normality. This proves the point that Femara was the culprit phenomenon that I never have seen and discussed with my partner, Danelle Cespedes MD. He never has seen this neither.   The patient returned on 05/09/2022 with no symptoms of anything in particular besides minimal respiratory allergies and pain in the left hip associated with her previous surgery. Her clinical examination today is very much unremarkable, she looks fantastic. She has no symptoms or signs of breast cancer recurrence. White count, hemoglobin and platelets are normal. Her tumor marker CA 15-3 has remained normal and actually lower than ever and compliance with Aromasin has been 100% with excellent tolerance.   Given the circumstances of this I advised her to  remain on Aromasin 25 mg a day and I find no use for this patient to go back onto Kisqali or medicines of this nature.   On 08/16/2022 we reviewed the patient in regard to her history of breast cancer. Clinically she has not had any obvious recurrent disease in the chest wall in the lung anatomy or abdomen or pelvis. She remains on Aromasin adjuvantly and we are waiting to review tumor marker. She has a chest wall that is completely flat due to previous mastectomies and radiation therapy. There is no axillary adenopathies and the patient has in my opinion control of her disease process as far as we can tell. I advised her to remain on Aromasin 25 mg a day. I went ahead and scheduled a visit with us in a few weeks in order to further review radiological analysis that will be discussed below.  On 09/14/2022 the patient has had in the interim a CT scan of the chest, abdomen and pelvis for review personally. Her pulmonary anatomy remains normal, there is no pulmonary congestion, pleural effusions, pericardial effusion, cardiomegaly, hilar or mediastinal adenopathy. There is prepericardiac lymph node measuring almost 1.5 to 2 cm in size that is flat like a small finger that has been present before and has minimally enlarged. If this has anything to do with her previous history of breast cancer is difficult to state but this has been evaluated before with similarity in regards size and some degree of fluctuation. I think this does not constrain me from thinking with a normal tumor marker of CA 15-3 of 20 during the previous visit or compel to perform either removal or biopsy at this time or proceed with radiological assessment with a PET scan. I do believe that this is not representation of anything in particular. Her liver anatomy and the rest of the bony anatomy, pancreas, kidneys, spleen and retroperitoneum remain unchanged. Given this finding I advised the patient to proceed and continue her Aromasin without the need  to add any other medication to her regimen of medication especially in the background of A-fib. I advised her to proceed with reassessment with me in 6 weeks with a repeat CBC, CMP and CA 15-3. In that moment also we will obtain a liquid biopsy for further analysis. Her liver function test has remained normal and Aromasin has not produced any chemical hepatitis. I advised her again to remain on this medicine by itself.   On 10/25/2022 the patient remains on Aromasin. She has no symptoms or signs that would indicate breast cancer recurrence and the latest tumor marker CA 15-3 was 20 two months ago. We have another one pending today. Given these circumstances I advised her to remain on Aromasin for the time being. I find no need for radiological assessment at this time. She will be called at home with the report of her tumor marker.   On 12/28/2022 the patient had no symptoms or signs of breast cancer progression or recurrence on any level. Tolerance to Aromasin is excellent with no side effects and she has 100% compliance on the medication. She was advised to remain on the medication. Her tumor marker CA 15-3 has remained normal. No plans for radiological assessment unless new clinical changes occur or tumor marker changes.   On 03/29/2023 the patient has no symptoms or signs of breast cancer recurrence. She continues taking her aromatase inhibitor medicine on a routine basis with 100% compliance and no significant side effects from the medication. Clinically she has no evidence of breast cancer recurrence and during the previous visit her CA 15-3 remained normal, another one is pending today that will be discussed with her on the telephone once it becomes available. Given the stability of her clinical picture and the excellent clinical status I advised the patient to remain on her medicine for the time being returning to see us back every 3 months with a CBC, CMP and CA 15-3. I find no need for radiological  assessment on her at this point.   6/27/2023: Patient reviewed back today in office. She does not have any clinical findings suggestive of malignancy reoccurrence. She remains on Aromasin daily. Labs reviewed. CBC and CMP are both normal. CA 15-3 is normal at 18.5. Patient encouraged to become more active to help with weight gain and arthritis.   On 09/27/2023 the patient was further reviewed. She has no symptoms or signs of breast cancer recurrence. Her chest wall is unremarkable, the lymphedema in the left upper extremity is a grade 1 at the most controlled and she has no need for any elastic support. She has not developed any cellulitis or infection in the left upper extremity. The patient is taking Aromasin adjuvantly 100% compliance, no evidence of recurrent disease. Normal white count, hemoglobin and platelets pending chemistry profile. We advised her to remain on Aromasin 25 mg a day. She will be returning to see us back in 3 months with repeat CBC, CMP and CA 15-3. No need for radiological assessment on her at this point.    On 12/28/2023 no symptoms or signs of breast cancer recurrence. My conversation with, Danni Odell MD, today took place in regard to the potentiality for pleuritic pain on the right side. Lung auscultation is normal and the patient has no symptoms. This pain comes and goes very sporadically and is not bothering her on routine basis. Knowing that she has had a minimal pericardial alteration before we opted to proceed with a CT angiogram of the chest that will be done tomorrow and we will review this with the patient at some point on the telephone in the next 48 hours or so.     If we assume that her cancer marker CA 15-3 remains stable the patient will remain on Aromasin and we will review her radiological analysis again. Otherwise plan to review her back in 3 months with repeat CBC, CMP and CA 15-3.     On 01/18/2024, the patient was brought to the office to discuss the findings of the  pathological report of her retrosternal mass that documents carcinoma of the breast, metastatic, ER positive, MS positive, HER2 negative. Further analysis of this by Clara has been requested.     On this basis, I do believe that the patient has now officially metastatic breast cancer to different sites, specifically inside of the chest. I do not see any options in regard to surgical resection of this, neither radiation therapy because the heart is behind the tumoral site. Radiation therapy will be highly damaging to her heart, especially receiving anthracycline in the past. She had received Kisqali in the past with tremendous benefit and we will proceed with the management of this medicine at the dose of 200 mg twice a day. In anticipation of the use of this, the patient will have a magnesium supplement every day and some cashews every day and she will proceed with an EKG today to measure QT interval. This will be checked back in a couple of weeks.     The patient will require laboratory assessment every 2 weeks to monitor white count, hemoglobin and platelets in regard to Kisqali utilization.     I went ahead and requested a PET scan to be done as early as next week. The patient will have formal education and consent for Kisqali and the medicine will be delivered to her house to initiate this at some point probably in the middle of next week.     The patient will have the medication utilization at least every 6-8 weeks, repeat radiological assessment then and then decide how to proceed. We now know that her tumor marker is not useful to follow up on her disease process.    On 01/31/2024, the patient has not had any side effects of Kisqali administration along with Aromasin. Kisqali will continue at the present dose and the present schedule. We will continue rechecking her every couple of weeks and I will review her back in 4 weeks. Today her EKG shows atrial fibrillation with controlled ventricular response and a  QT interval that remains stable, unchanged from previous EKG. Formal request in regard to EKG interpretation has been done.      2. Left hip pain.   05/17/2021: Patient reports new complaint of progressive discomfort and the inability to function given the pain in the left hip area. Now she is limping. The only time when she is not in discomfort with the left hip is when she is sitting or lying in bed or riding the horse when gravity takes away the pressure of her left hip area. Radiologically speaking the CT scan of the chest, abdomen and pelvis to my eyes disclosed no abnormalities besides a very simple ovarian cyst that measures close to 7 x 5 cm with no trabeculation. There is no pelvic ascites. There is no pelvic adenopathy. The other abnormality obviously visible is the severe degree of scoliosis of the thoracic, lumbar spines.   It caught my attention the subchondral cyst in the left hip and the minimal if any space leftover between the head of the femur and the acetabulum. There is no metastasis in this anatomical site.   The radiologist mentioned cardiophrenic angle lymphadenopathy. I cannot see that from my naked eyes. I do not know what it is and I do not know how to express it. Pulmonary anatomy is normal. Hilar adenopathy is normal. No pericardial effusion. No pleural effusion. Minimal infiltrate in the lungs related to radiation changes. Liver anatomy, pancreas and kidneys were unremarkable. The scoliosis is very obvious in the view of the abdomen.   Patient refereed with Dr. Leonard, orthopedic surgeon.   Patient underwent left hip replacement and recovered well.   On 09/27/2023 her left hip pain is very minimal at this time, she is not limping. 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 Her osteoarthritis in her hands is still a prevalent issue with aches and pains periodically. No need for pain medicine at this time besides  Tylenol.     On 12/28/2023 she has arthritic pain in multiple sites in her back, in her hips, in her knees, in her hands. She takes now as per my advice Tylenol and I told her absolutely no antiinflammatory medications whatsoever.     On 01/18/2024, no issues pertinent.    On 01/31/2024, no bone pain at this time .      3. Ovarian cyst  05/17/2021: Her CA 15-3 was minimal elevated in comparison to previous assessment and pelvic ultrasound ordered. t raises the following question.   05/24/2021: This is also specific in regard to the ovaries and actually an ultrasound of her vagina looking into the ovaries documented an unilocular cyst on the left side with typical features of benignity and a  was completely normal 5.5.   6/27/2023: CA 15-3 is normal at 18.5.  On 09/27/2023 no issues pertinent to this. This will remain in observation.    On 12/28/2023 this is asymptomatic. No issues pertinent to this at this time.     On 01/18/2024, no issues pertinent.    On 01/31/2024, her ovarian cyst has not changed radiologically in size. This will be left alone for the time being.      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   Her liver enzymes are completely normal today. She remains on Aromasin and obviously this patient never will be back on letrozole under any circumstances. This was documented through liver biopsy and through removal of the medication that triggered quick improvement in her liver function test.   On 09/14/2022 there is no evidence of drug induced hepatitis. Aromasin will be continued. The patient is aware that she never will be able to take Femara.   On 10/25/2022 her liver enzymes are completely normal today. Since discontinuation of Femara her drug induced hepatitis has resolved. This situation was extremely rare.   ON 12/28/2022 no issues pertinent to liver function after discontinuation of Femara months ago.   On 03/29/2023 waiting to review her liver function. All of the abnormalities resolved  after stopping letrozole.   6/27/2023: Liver enzymes remain normal.   No issues pertinent to this. She remains is observation.    On 12/28/2023 her liver function test is completely normal today. Aromasin is not producing any issues.    On 01/18/2024, no issues pertinent.    On 01/31/2024, no abnormalities in liver function test related to Femara because this medicine was discontinued time ago. Minimal alteration in liver function test related to Kisqali today with no implications.      5. Septic Arthritis  The patient has developed an episode of septic arthritis in many joints including left shoulder and left ankle. She required antibiotic therapy as per recently. Infectious Disease was involved in this. In fact I discussed the case with them on the telephone. I suggested choosing the PICC line to be placed on the right arm in order to be able to deliver antibiotic therapy according to the proper indication. She completed the PICC line and it was removed last week with no complications or problems. The right upper extremity is completely normal. It is difficult for me to know why she developed the septic arthritis. She is in contact with horses all the time. The pathogen that she had in the joint is very uncommon in my opinion and the patient had no obvious source including no sinuses, no dental source, no obvious cardiac source, no gastrointestinal or genitourinary source and no skin source. It raises the question if this pathogen could evade to colonic source and I think I feel the obligation for this patient to have at least a CT scan of the abdomen and pelvis and eventually in several months from now consider a colonoscopy. Another consideration could be a Cologuard in some way. At least the patient’s infection seems to be under control. Her joints are still sore today including left shoulder, left ankle. Local inflammatory signs are very much gone. The only area of inflammation that she has in the 1st MP joint  on the left hand probably represents osteoarthritis, no more than that. She will remain in observation from this point of view.  On 09/14/2022 the patient has no symptoms that would suggest any further spreading or new development of septic arthritis. My fear was related to the possible seeding of her hip replacement areas by bacteria during the bacteremia that she had not too long ago. It seems that that is not the case. Radiologically speaking I do not see any local inflammation or reaction in any of these anatomical sites. There is perfect symmetry in the hip areas in regard to all her hardware material. Her sedimentation rate is BACK TO normal. Her white count is normal and this process will be watched in absence of any other intervention.  On 10/25/2022 no new episodes of septic arthritis. I measured her sedimentation rate during the previous visit that was down to 9, previously was in the 8-9 category. This means resolution of an inflammatory process altogether.   On 12/28/2022 at this time she is experiencing her typical symptom of osteoarthritis in her hands and hips but no issues pertinent to septic arthritis whatsoever. Deformities in the hands are ongoing due to osteoarthritis with no other issues or problems. No lymphedema in upper extremities.   On 03/29/2023 no significant sequela from her multiple foci and septic arthritis. What she has now is just typical osteoarthritis symptomatology and she uses Tylenol for this and occasional ibuprofen.   6/27/2023: Patient continues to report intermittent joint discomfort. Continues to manage pain with tylenol and occasional ibuprofen.   No issues pertinent to this.    On 12/28/2023 there is no evidence of recurrence of septic arthritis at this time.    On 01/18/2024, no issues pertinent.    On 01/31/2024, no septic arthritis symptomatology at this point.        6. Atrial Fibrillation  The patient developed atrial fibrillation with rapid ventricular response  during the hospitalization with septic arthritis. Echocardiogram documented very dilated left atrium. She is now receiving Eliquis, metoprolol, and digoxin. Her ventricular response is controlled today but she remains in AFib. She has requested a followup with Dr. Odell and I went ahead and made her an appointment. 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 If she had a fall and she is taking anticoagulants the only thing that we are going to have is just trouble. She has sold one of the horses. She will sell the other horse very soon and she will remain on land for the time being. Her  is back in town and he is helping her with consecution of groceries and things of this nature under the present circumstances. I advised her to minimize any trauma.  On 09/14/2022 the patient will be seen by Electrophysiology tomorrow in regard to consideration of cardioversion for her AFib. She has discussed this with Dr. Odell and I have reviewed this data. That is perfectly fine from my point of view. I find no form of contraindication from my side of the story if this is the methodology that is chosen to try to revert her to normal sinus rhythm.  On 10/25/2022 the patient is in normal sinus rhythm today. She remains on anticoagulants. She has cardiology appointment tomorrow. I asked her to buy a pulse oximeter and I taught her how to interpret the little wave curve of the pulse oximeter in regard to her heart rate. Typically patients in A-fib have very irregular pulse chaotic and the little wave curves will be continuously changing and besides the pulse rate will be continuously changing depending on the speed of the process. That is very simple to interpret. If se develops that problem I advised her to call her cardiologist.   On 12/28/2022 today she is in normal sinus rhythm by cardiac auscultation. Echocardiogram to be done by Mini  MD Jose R, in the next few days and further recommendations at that point. We strongly advised the patient about the continuation of Eliquis.   On 03/29/2023 clinically her heart obeys to normal sinus rhythm.   6/27/2023: Patient continues to be followed by Dr. Odell and has a stress test next week with Dr. Goodman. Patient remains on Eliquis with good tolerance.   On 09/27/2023 the patient has been in A-fib since the time that she was seen by, Danni Odell MD, at the time that she had cardiac stress testing. Her A-fib is under control today with medications in regard to rate but she raised the question when, Manjeet Gustafson MD, is going to see her. I do not know that question but I will go ahead and request an appointment and send him a note.     On 12/28/2023 the patient is back into atrial fibrillation. Dr. Danni Odell, is controlling ventricular response and the patient is on Eliquis with no embolic phenomenon.     On 01/18/2024, no new issues pertinent to this. She continues being monitored by Dr. Odell.     On 01/31/2024, her atrial fibrillation remains controlled. Her QT interval is still applicable and normal. QT interval has not been modified by Kisqali administration. She remains on magnesium supplement.      7. Grade 3 lymphedema in the left upper extremity    I went and made an urgent referral to the Lymphedema Clinic today to see if further bandage and drainage of this and massage would be helpful to her in the long run to control the problem. I still advised her to be extremely careful in regard to any injury or lesions in the skin of the left upper extremity to minimize any potentiality of cellulitis. In the long run if she has any episodes she will need to be back on antibiotics right away.   On 09/14/2022 the patient's lymphedema in the left upper extremity continues. She already has an appointment to be seen by the lymphedema clinic, she will require a new sleeve and massage and interventional therapy for this.  She is aware that she needs to avoid any trauma in the left upper extremity to minimize any cellulitis. I strongly believe as posted before that septic arthritis is part of her lymphedema issues.   On 10/25/2022 her lymphedema in the left upper extremity is very much resolved with the sleeve and all of the manipulation that she has had in the lymphedema clinic. I advised her to be very prudent in regard doing any nicking in the skin and damaging the skin pertinent to the left upper extremity to minimize cellulitis and potentiality for further problems.   On 12/28/2022 no lymphedema in either extremity. No need for sleeve use at this time.   On 03/29/2023 the patient has no leftover lymphedema in either extremity.  6/27/2023: Stable.   Lymphedema in the left upper extremity on 09/27/2023 is grade 1 and has not required any sleeve usage or elastic bandage at this time.     On 12/28/2023 her lymphedema in the left upper extremity is very minimal grade 1 at this point. No secondary.    On 01/18/2024, probably a grade 1 lymphedema in the left upper extremity.    On 01/31/2024, her lymphedema in the left upper extremity is gone.        8. Hypertension  In regard to hypertension management I have controlled this before. Now she is taking quite amount of metoprolol. I will give up the management of this and now that she will see Dr. Odell, they will provide the care of this issue. I would not be surprised if the patient in the long run needs to be receiving another blood pressure medication given the fact that her blood pressure is still marginally elevated today. Taking digoxin, I do not think Lasix or hydrochlorothiazide will be a good choice because of the potential for hypokalemia unless the potassium is replaced and monitored very close. This will probably minimize the potentiality for digoxin toxicity.  On 09/14/2022 her blood pressure is controlled with the metoprolol that is provided by the cardiology team. She was  advised again to avoid antiinflammatory medication for pain control.   On 10/25/2022 her blood pressure is under good control and I advised her to continue taking her blood pressure medication and once more I advised against the use of any antiinflammatory medication by oral route.   On 12/28/2022 blood pressure under good control, medications will remain the same.  On 03/29/2023 her blood pressure remains under excellent control.   6/27/2023: Continues to be followed by Cardiology.   On 09/27/2023 her blood pressure is under good control.    On 12/28/2023 her blood pressure has been spiking including to a 190 systolic today in the office of, Danni Odell MD. Amlodipine was given. Her blood pressure today here is going down. The patient is eating exaggerated amount of salt and on top of that she is taking antiinflammatory nonsteroidal medications at least 6 times a week, all of this in conjunction with raised blood pressure. I told her one more time she cannot take antiinflammatory medication, she can only take Tylenol. Dr. Danni Odell, will be adjusting her blood pressure medicine accordingly.     On 01/18/2024, proper blood pressure control at this time.    On 01/31/2024, her blood pressure is in excellent range today, 124/87.      9. Insomnia  The problem is what to provide her for this problem at this time. There are cardiac issues. I do believe that this patient will be better off at least for the next 6 weeks taking a benzodiazapine or something of this nature more than any other medication. I do not feel compelled to give her gabapentin that actually has not worked for her in the past. Therefore I went ahead and prescribed for her medication in this regard today to take it at bedtime to see if this allows her to sleep properly.   On 10/25/2022 the patient persists having insomnia not sleeping more than 3 hours taking Ambien. I went ahead and discontinued this medicine. I gave her Seroquel 25 mg tablets to take 1  orally nightly. I asked her to call my nurse Priya Gonzalez next week and let us know how she is doing in that arena.   6/27/2023: Patient is no longer on Seroquel. Insomnia is mildly improved.     On 09/27/2023 the patient is no longer requiring any insomnia medication.    On 12/28/2023 not requiring any insomnia medication.     On 01/18/2024, not requiring any medicine for this at this time.    On 01/31/2024, requiring Unisom for insomnia.      Plan:    The plan of care has been established clearly including the continuation of Aromasin 25 mg a day, Kisqali 200 mg p.o. b.i.d. for 21 days out of the month, and continuation of the rest of the other medications for her comorbidities. This includes also utilization of Eliquis at full dose in somebody who has atrial fibrillation with controlled ventricular response.     Furthermore, I discussed with the patient and her brother today the fact that [facility] analysis of the tissue from her biopsy of the pericardium documents that the patient has a high tumor mutation burden that will make her applicable to receive immunotherapy in the future. 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 This confirms the need for this patient to remain on her regimen of medicines, Kisqali and Aromasin to control her disease process. I provided them copies of the report of this and I provided them in simple exercise the report of tissue analysis.     Finally, the patient question if the atrial fibrillation that she has now could be related to tumoral lesion in the pericardium. I doubt that that is the case. There is no obvious tamponade. There is no pericardial rub and there are no obvious other manifestations of alterations pertinent to this at this point. I discussed all these facts with the patient and her brother in the room. This patient is requiring multiple medicines that carry high risk of toxicity including Kisqali, Aromasin and anticoagulants.      The patient remains in atrial fibrillation and she remains on anticoagulants.          Patient remains on high risk medication and requires close monitoring for toxicity.

## 2024-02-03 DIAGNOSIS — Z17.0 MALIGNANT NEOPLASM OF OVERLAPPING SITES OF LEFT BREAST IN FEMALE, ESTROGEN RECEPTOR POSITIVE: ICD-10-CM

## 2024-02-03 DIAGNOSIS — C50.812 MALIGNANT NEOPLASM OF OVERLAPPING SITES OF LEFT BREAST IN FEMALE, ESTROGEN RECEPTOR POSITIVE: ICD-10-CM

## 2024-02-05 RX ORDER — EXEMESTANE 25 MG/1
25 TABLET ORAL DAILY
Qty: 30 TABLET | Refills: 3 | Status: SHIPPED | OUTPATIENT
Start: 2024-02-05

## 2024-02-07 ENCOUNTER — SPECIALTY PHARMACY (OUTPATIENT)
Dept: PHARMACY | Facility: HOSPITAL | Age: 64
End: 2024-02-07
Payer: MEDICARE

## 2024-02-07 DIAGNOSIS — Z17.0 MALIGNANT NEOPLASM OF OVERLAPPING SITES OF LEFT BREAST IN FEMALE, ESTROGEN RECEPTOR POSITIVE: ICD-10-CM

## 2024-02-07 DIAGNOSIS — C50.812 MALIGNANT NEOPLASM OF OVERLAPPING SITES OF LEFT BREAST IN FEMALE, ESTROGEN RECEPTOR POSITIVE: ICD-10-CM

## 2024-02-07 RX ORDER — EXEMESTANE 25 MG/1
25 TABLET ORAL DAILY
Qty: 30 TABLET | Refills: 3 | OUTPATIENT
Start: 2024-02-07

## 2024-02-07 NOTE — PROGRESS NOTES
Called patient to check in on a recent restart of ribociclib and had to leave a message and a call back number.

## 2024-02-07 NOTE — PROGRESS NOTES
I have secured pt $15,000 through [employer] for her Kisqali. This is good from 1/8/2024-1/7/2025.    She will be able to continue to fill with [facility] for no charge to her.       Estelle Emmanuel, Pharmacy Technician  Specialty Pharmacy Technician

## 2024-02-09 LAB
LAB AP CASE REPORT: NORMAL
LAB AP CLINICAL INFORMATION: NORMAL
LAB AP DIAGNOSIS COMMENT: NORMAL
LAB AP SPECIAL STAINS: NORMAL
LAB AP SYNOPTIC CHECKLIST: NORMAL
Lab: NORMAL
Lab: NORMAL
PATH REPORT.ADDENDUM SPEC: NORMAL
PATH REPORT.FINAL DX SPEC: NORMAL
PATH REPORT.GROSS SPEC: NORMAL
REF LAB TEST METHOD: NORMAL

## 2024-02-12 ENCOUNTER — CLINICAL SUPPORT (OUTPATIENT)
Dept: ONCOLOGY | Facility: HOSPITAL | Age: 64
End: 2024-02-12
Payer: MEDICARE

## 2024-02-12 ENCOUNTER — LAB (OUTPATIENT)
Dept: LAB | Facility: HOSPITAL | Age: 64
End: 2024-02-12
Payer: MEDICARE

## 2024-02-12 VITALS — HEART RATE: 87 BPM

## 2024-02-12 DIAGNOSIS — Z17.0 MALIGNANT NEOPLASM OF OVERLAPPING SITES OF BOTH BREASTS IN FEMALE, ESTROGEN RECEPTOR POSITIVE: ICD-10-CM

## 2024-02-12 DIAGNOSIS — C50.812 MALIGNANT NEOPLASM OF OVERLAPPING SITES OF LEFT BREAST IN FEMALE, ESTROGEN RECEPTOR POSITIVE: Primary | ICD-10-CM

## 2024-02-12 DIAGNOSIS — C50.811 MALIGNANT NEOPLASM OF OVERLAPPING SITES OF BOTH BREASTS IN FEMALE, ESTROGEN RECEPTOR POSITIVE: ICD-10-CM

## 2024-02-12 DIAGNOSIS — Z17.0 MALIGNANT NEOPLASM OF OVERLAPPING SITES OF LEFT BREAST IN FEMALE, ESTROGEN RECEPTOR POSITIVE: Primary | ICD-10-CM

## 2024-02-12 DIAGNOSIS — Z79.899 ENCOUNTER FOR LONG-TERM (CURRENT) USE OF OTHER MEDICATIONS: Primary | ICD-10-CM

## 2024-02-12 DIAGNOSIS — C50.812 MALIGNANT NEOPLASM OF OVERLAPPING SITES OF BOTH BREASTS IN FEMALE, ESTROGEN RECEPTOR POSITIVE: ICD-10-CM

## 2024-02-12 LAB
ALBUMIN SERPL-MCNC: 4.6 G/DL (ref 3.5–5.2)
ALBUMIN/GLOB SERPL: 1.8 G/DL
ALP SERPL-CCNC: 111 U/L (ref 39–117)
ALT SERPL W P-5'-P-CCNC: 176 U/L (ref 1–33)
ANION GAP SERPL CALCULATED.3IONS-SCNC: 10.6 MMOL/L (ref 5–15)
AST SERPL-CCNC: 61 U/L (ref 1–32)
BASOPHILS # BLD AUTO: 0.03 10*3/MM3 (ref 0–0.2)
BASOPHILS NFR BLD AUTO: 1.3 % (ref 0–1.5)
BILIRUB SERPL-MCNC: 0.5 MG/DL (ref 0–1.2)
BUN SERPL-MCNC: 16 MG/DL (ref 8–23)
BUN/CREAT SERPL: 15.4 (ref 7–25)
CALCIUM SPEC-SCNC: 9.7 MG/DL (ref 8.6–10.5)
CHLORIDE SERPL-SCNC: 103 MMOL/L (ref 98–107)
CO2 SERPL-SCNC: 28.4 MMOL/L (ref 22–29)
CREAT SERPL-MCNC: 1.04 MG/DL (ref 0.57–1)
DEPRECATED RDW RBC AUTO: 49.1 FL (ref 37–54)
EGFRCR SERPLBLD CKD-EPI 2021: 59.4 ML/MIN/1.73
EOSINOPHIL # BLD AUTO: 0.03 10*3/MM3 (ref 0–0.4)
EOSINOPHIL NFR BLD AUTO: 1.3 % (ref 0.3–6.2)
ERYTHROCYTE [DISTWIDTH] IN BLOOD BY AUTOMATED COUNT: 14.5 % (ref 12.3–15.4)
GLOBULIN UR ELPH-MCNC: 2.6 GM/DL
GLUCOSE SERPL-MCNC: 101 MG/DL (ref 65–99)
HCT VFR BLD AUTO: 42.7 % (ref 34–46.6)
HGB BLD-MCNC: 13.8 G/DL (ref 12–15.9)
IMM GRANULOCYTES # BLD AUTO: 0.02 10*3/MM3 (ref 0–0.05)
IMM GRANULOCYTES NFR BLD AUTO: 0.9 % (ref 0–0.5)
LYMPHOCYTES # BLD AUTO: 0.89 10*3/MM3 (ref 0.7–3.1)
LYMPHOCYTES NFR BLD AUTO: 38.9 % (ref 19.6–45.3)
MCH RBC QN AUTO: 30.7 PG (ref 26.6–33)
MCHC RBC AUTO-ENTMCNC: 32.3 G/DL (ref 31.5–35.7)
MCV RBC AUTO: 95.1 FL (ref 79–97)
MONOCYTES # BLD AUTO: 0.15 10*3/MM3 (ref 0.1–0.9)
MONOCYTES NFR BLD AUTO: 6.6 % (ref 5–12)
NEUTROPHILS NFR BLD AUTO: 1.17 10*3/MM3 (ref 1.7–7)
NEUTROPHILS NFR BLD AUTO: 51 % (ref 42.7–76)
NRBC BLD AUTO-RTO: 0 /100 WBC (ref 0–0.2)
PLATELET # BLD AUTO: 161 10*3/MM3 (ref 140–450)
PMV BLD AUTO: 8.3 FL (ref 6–12)
POTASSIUM SERPL-SCNC: 4.6 MMOL/L (ref 3.5–5.2)
PROT SERPL-MCNC: 7.2 G/DL (ref 6–8.5)
RBC # BLD AUTO: 4.49 10*6/MM3 (ref 3.77–5.28)
SODIUM SERPL-SCNC: 142 MMOL/L (ref 136–145)
WBC NRBC COR # BLD AUTO: 2.29 10*3/MM3 (ref 3.4–10.8)

## 2024-02-12 PROCEDURE — 80053 COMPREHEN METABOLIC PANEL: CPT

## 2024-02-12 PROCEDURE — 85025 COMPLETE CBC W/AUTO DIFF WBC: CPT

## 2024-02-12 PROCEDURE — 36415 COLL VENOUS BLD VENIPUNCTURE: CPT

## 2024-02-12 PROCEDURE — 93005 ELECTROCARDIOGRAM TRACING: CPT

## 2024-02-16 ENCOUNTER — SPECIALTY PHARMACY (OUTPATIENT)
Dept: PHARMACY | Facility: HOSPITAL | Age: 64
End: 2024-02-16
Payer: MEDICARE

## 2024-02-16 ENCOUNTER — TELEPHONE (OUTPATIENT)
Dept: ONCOLOGY | Facility: CLINIC | Age: 64
End: 2024-02-16
Payer: MEDICARE

## 2024-02-22 ENCOUNTER — OFFICE VISIT (OUTPATIENT)
Dept: ONCOLOGY | Facility: CLINIC | Age: 64
End: 2024-02-22
Payer: MEDICARE

## 2024-02-22 ENCOUNTER — LAB (OUTPATIENT)
Dept: LAB | Facility: HOSPITAL | Age: 64
End: 2024-02-22
Payer: MEDICARE

## 2024-02-22 ENCOUNTER — SPECIALTY PHARMACY (OUTPATIENT)
Dept: PHARMACY | Facility: HOSPITAL | Age: 64
End: 2024-02-22
Payer: MEDICARE

## 2024-02-22 VITALS
WEIGHT: 176.2 LBS | HEART RATE: 78 BPM | TEMPERATURE: 98 F | OXYGEN SATURATION: 96 % | HEIGHT: 65 IN | DIASTOLIC BLOOD PRESSURE: 86 MMHG | RESPIRATION RATE: 18 BRPM | SYSTOLIC BLOOD PRESSURE: 119 MMHG | BODY MASS INDEX: 29.36 KG/M2

## 2024-02-22 DIAGNOSIS — C50.812 MALIGNANT NEOPLASM OF OVERLAPPING SITES OF LEFT BREAST IN FEMALE, ESTROGEN RECEPTOR POSITIVE: Primary | ICD-10-CM

## 2024-02-22 DIAGNOSIS — T50.905A DRUG-INDUCED HEPATITIS: ICD-10-CM

## 2024-02-22 DIAGNOSIS — Z17.0 MALIGNANT NEOPLASM OF OVERLAPPING SITES OF LEFT BREAST IN FEMALE, ESTROGEN RECEPTOR POSITIVE: Primary | ICD-10-CM

## 2024-02-22 DIAGNOSIS — Z17.0 MALIGNANT NEOPLASM OF OVERLAPPING SITES OF LEFT BREAST IN FEMALE, ESTROGEN RECEPTOR POSITIVE: ICD-10-CM

## 2024-02-22 DIAGNOSIS — Z79.899 HIGH RISK MEDICATION USE: ICD-10-CM

## 2024-02-22 DIAGNOSIS — K71.6 DRUG-INDUCED HEPATITIS: ICD-10-CM

## 2024-02-22 DIAGNOSIS — C50.812 MALIGNANT NEOPLASM OF OVERLAPPING SITES OF LEFT BREAST IN FEMALE, ESTROGEN RECEPTOR POSITIVE: ICD-10-CM

## 2024-02-22 LAB
ALBUMIN SERPL-MCNC: 4.7 G/DL (ref 3.5–5.2)
ALBUMIN/GLOB SERPL: 1.9 G/DL
ALP SERPL-CCNC: 105 U/L (ref 39–117)
ALT SERPL W P-5'-P-CCNC: 141 U/L (ref 1–33)
ANION GAP SERPL CALCULATED.3IONS-SCNC: 8.7 MMOL/L (ref 5–15)
AST SERPL-CCNC: 69 U/L (ref 1–32)
BASOPHILS # BLD AUTO: 0.05 10*3/MM3 (ref 0–0.2)
BASOPHILS NFR BLD AUTO: 1.5 % (ref 0–1.5)
BILIRUB SERPL-MCNC: 0.3 MG/DL (ref 0–1.2)
BUN SERPL-MCNC: 21 MG/DL (ref 8–23)
BUN/CREAT SERPL: 19.8 (ref 7–25)
CALCIUM SPEC-SCNC: 9.6 MG/DL (ref 8.6–10.5)
CHLORIDE SERPL-SCNC: 102 MMOL/L (ref 98–107)
CO2 SERPL-SCNC: 29.3 MMOL/L (ref 22–29)
CREAT SERPL-MCNC: 1.06 MG/DL (ref 0.57–1)
DEPRECATED RDW RBC AUTO: 51.1 FL (ref 37–54)
EGFRCR SERPLBLD CKD-EPI 2021: 58.1 ML/MIN/1.73
EOSINOPHIL # BLD AUTO: 0.02 10*3/MM3 (ref 0–0.4)
EOSINOPHIL NFR BLD AUTO: 0.6 % (ref 0.3–6.2)
ERYTHROCYTE [DISTWIDTH] IN BLOOD BY AUTOMATED COUNT: 14.9 % (ref 12.3–15.4)
GLOBULIN UR ELPH-MCNC: 2.5 GM/DL
GLUCOSE SERPL-MCNC: 100 MG/DL (ref 65–99)
HCT VFR BLD AUTO: 43.3 % (ref 34–46.6)
HGB BLD-MCNC: 14.1 G/DL (ref 12–15.9)
IMM GRANULOCYTES # BLD AUTO: 0.01 10*3/MM3 (ref 0–0.05)
IMM GRANULOCYTES NFR BLD AUTO: 0.3 % (ref 0–0.5)
LYMPHOCYTES # BLD AUTO: 1.22 10*3/MM3 (ref 0.7–3.1)
LYMPHOCYTES NFR BLD AUTO: 36.4 % (ref 19.6–45.3)
MCH RBC QN AUTO: 31.1 PG (ref 26.6–33)
MCHC RBC AUTO-ENTMCNC: 32.6 G/DL (ref 31.5–35.7)
MCV RBC AUTO: 95.4 FL (ref 79–97)
MONOCYTES # BLD AUTO: 0.37 10*3/MM3 (ref 0.1–0.9)
MONOCYTES NFR BLD AUTO: 11 % (ref 5–12)
NEUTROPHILS NFR BLD AUTO: 1.68 10*3/MM3 (ref 1.7–7)
NEUTROPHILS NFR BLD AUTO: 50.2 % (ref 42.7–76)
NRBC BLD AUTO-RTO: 0 /100 WBC (ref 0–0.2)
PLATELET # BLD AUTO: 207 10*3/MM3 (ref 140–450)
PMV BLD AUTO: 8.2 FL (ref 6–12)
POTASSIUM SERPL-SCNC: 4.4 MMOL/L (ref 3.5–5.2)
PROT SERPL-MCNC: 7.2 G/DL (ref 6–8.5)
RBC # BLD AUTO: 4.54 10*6/MM3 (ref 3.77–5.28)
SODIUM SERPL-SCNC: 140 MMOL/L (ref 136–145)
WBC NRBC COR # BLD AUTO: 3.35 10*3/MM3 (ref 3.4–10.8)

## 2024-02-22 PROCEDURE — 85025 COMPLETE CBC W/AUTO DIFF WBC: CPT

## 2024-02-22 PROCEDURE — 80053 COMPREHEN METABOLIC PANEL: CPT

## 2024-02-22 PROCEDURE — 36415 COLL VENOUS BLD VENIPUNCTURE: CPT

## 2024-02-22 NOTE — PROGRESS NOTES
Drug: Kisqali (ribociclib)  Strength: 200 mg  Directions: Take two tablets by mouth daily for 21 days on then 7 days off  Quantity: 42  Refills: 1  Pharmacy prescription sent to:  [facility]ty Pharm[facility]     Completed independent double check on medication order/RX.  [place] Rph, BCOP

## 2024-02-22 NOTE — PROGRESS NOTES
Re: Refills of Oral Specialty Medication - Kisqali (ribociclib)    Drug-Drug Interactions: The current medication list was reviewed and there are no relevant drug-drug interactions with the specialty medication.  Medication Allergies: The patient has no relevant allergies as it relates to their oral specialty medication  Review of Labs/Dose Adjustments: NO DOSE CHANGE - I reviewed the most recent note and labs and the patient will continue without any dose changes.  I sent refills as described below.    Drug: Kisqali (ribociclib)  Strength: 200 mg  Directions: Take two tablets by mouth daily for 21 days on then 7 days off  Quantity: 42  Refills: 1  Pharmacy prescription sent to:  [facility]  Specialty Pharmacy    Name/Credentials: Gina Lopez, DaylinD, BCPS     2/22/2024  15:58 EST

## 2024-02-22 NOTE — PROGRESS NOTES
Specialty Pharmacy Refill Coordination Note     Marylu is a 64 y.o. female contacted today regarding refills of Kisqali specialty medication(s).    Reviewed and verified with patient:       Specialty medication(s) and dose(s) confirmed: yes  Kisqali 400 mg daily for 21 days on then 7 days off.    Refill Questions      Flowsheet Row Most Recent Value   Changes to allergies? No   Changes to medications? No   New conditions or infections since last clinic visit No   Unplanned office visit, urgent care, ED, or hospital admission in the last 4 weeks  No   How does patient/caregiver feel medication is working? Fair   Financial problems or insurance changes  No   Since the previous refill, were any specialty medication doses or scheduled injections missed or delayed?  Yes   If yes, please provide the amount 2 days   Why were doses missed? Pt was instructed to stop her last cycle 2 days early due to lab work   Does this patient require a clinical escalation to a pharmacist? No            Delivery Questions      Flowsheet Row Most Recent Value   Delivery method [employer]  [Ship to home address-Ship 2/26 for delivery 2/27-$0 copay with [employer]-Address Confirmed]   Delivery address verified with patient/caregiver? Yes   Delivery address Home   Number of medications in delivery 1   Medication(s) being filled and delivered Ribociclib Succinate   Doses left of specialty medications 0-In off week   Copay verified? Yes   Copay amount $0 copay with [facility]   Copay form of payment No copayment ($0)              Medication Adherence          Adherence tools used: directed education      Support network for adherence: healthcare provider             Kisqali delivery coordinated with pt for 2/27/2024 to her home address. $0 copay with [employer]. Her last delivery was picked up on 1/25/2024. No questions or concerns to report to MTM Team today.      Follow-up: 21 day(s)     Estelle BROUSSARD  [place], Pharmacy Technician  Specialty Pharmacy Technician

## 2024-02-22 NOTE — PROGRESS NOTES
Subjective     REASON FOR FOLLOW UP:    1. 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 R BREAST MASS WITH GIANT R AXILLARY ADENOPATHY.  SHE UNDERWENT DOSE DENSE AC, TAXOL, BILATERAL MASTECTOMIES, DRAMATIC NEAR COMPLETE OH, RADIATION THERAPY AND ADJUVANT FEMARA STOPPED ON 12/21 BECAUSE DRUG INDUCED HEPATITIS, SWITCHED TO AROMASIN 2/22: NO SIDE EFFECTS.    3. FAMILY HISTORY OF BREAST CANCER IN MOTHER AND SISTER, SISTER WAS TESTED FOR BRCA AND WAS NEGATIVE.    4. LEFT OVARIAN CYST , IT HAS BEEN PRESENT FOR LONG TIME BUT IS GETTING LARGER, ASYMPTOMATIC, NEED FOR , PELVIC US AND GYN ONC EVal: no need for any intervention                  5. LEFT HIP PAIN SEVERE ARTHRITIS, REAL NEED FOR LEFT HIP REPLACEMENT: PERFORMED 8/21    6. DRUG INDUCED HEPATITIS,  FINALLY STOPPED FEMARA: DRAMATIC IMPROVEMENT AND RESOLUTION.      HISTORY OF PRESENT ILLNESS:   The patient is a 64 y.o. female with the above mentioned who returns to the office today for follow up and lab review. When she presented to the office 2/12/2024, she was day 18 of her first cycle of Kisqali. She was noted to be borderline neutropenic with WBC 2.29, ANC 1.17. LFTs were also elevated with AST 61, . She was instructed to hold the remaining 3 days of therapy and return to the office today. She reports she is feeling well. She is tolerating Aromasin without difficulty. She reports she felt fine the 2.5 weeks she took the Kisqali    Past Medical History:   Diagnosis Date    Anemia in neoplastic disease     Arthritis     Arthritis of back     Arthritis of neck     Breast cancer     Left    CVA (cerebral vascular accident)     Encounter for long-term (current) use of other medications 06/22/2021    Fracture, fibula     Fracture, finger     Frozen shoulder     Hip arthrosis 01/17    Hip pain     RIGHT HIP... CYST    History of fracture of leg 1987    History of radiation therapy     LAST TREATMENT      Hypertension      Knee swelling     Limited joint range of motion (ROM)     RIGHT HIP    Low back strain     Periarthritis of shoulder     Rotator cuff syndrome 6/22    Skin sore     OPEN SORE LEFT BREAST    Syncope     Vertigo            Past Surgical History:   Procedure Laterality Date    AXILLARY LYMPH NODE BIOPSY/EXCISION Right     LYMPH NODE UNDER RIGHT ARM-MALIGNANT (DOUBLE MASTECTOMY)    BREAST BIOPSY Left     MALIGNANT    FRACTURE SURGERY  1987    Leg    HARDWARE REMOVAL      INCISION AND DRAINAGE LEG Left 06/30/2022    Procedure: INCISION AND DRAINAGE left ankle;  Surgeon: Kenneth Tran MD;  Location: [facility];  Service: Orthopedics;  Laterality: Left;    JOINT REPLACEMENT Bilateral mar 2020,july 2021    hips    MASTECTOMY W/ SENTINEL NODE BIOPSY Bilateral 09/16/2019    Procedure: BILATERLA MODIFIED RADICAL MASTECTOMY WITH BILATERAL SENTINEL LYMPH NODE BIOPSY;  Surgeon: Joby Barron Jr., MD;  Location: [facility];  Service: General    TOTAL HIP ARTHROPLASTY Right 03/02/2020    Procedure: RIGHT TOTAL HIP ARTHROPLASTY NATALY NAVIGATION;  Surgeon: Luis M Leonard MD;  Location: [facility];  Service: Orthopedics;  Laterality: Right;    TOTAL HIP ARTHROPLASTY Left 07/20/2021    Procedure: Posterior LEFT TOTAL HIP ARTHROPLASTY NATALY NAVIGATION;  Surgeon: Luis M Leonard MD;  Location: [facility] OR;  Service: Orthopedics;  Laterality: Left;    VENOUS ACCESS DEVICE (PORT) INSERTION Right 02/01/2019    Procedure: INSERTION VENOUS ACCESS DEVICE;  Surgeon: Joby Barron Jr., MD;  Location: [facility];  Service: General    VENOUS ACCESS DEVICE (PORT) REMOVAL N/A 10/30/2019    Procedure: Mediport Removal;  Surgeon: Joby Barron Jr., MD;  Location: [facility];  Service: General        Current Outpatient Medications on File Prior to Visit   Medication Sig Dispense Refill    acetaminophen (TYLENOL) 325 MG tablet Take 2 tablets by mouth Every 6 (Six) Hours As Needed.      amLODIPine (NORVASC) 2.5  MG tablet Take 1 tablet by mouth Daily. 30 tablet 11    apixaban (ELIQUIS) 5 MG tablet tablet Take 1 tablet by mouth Every 12 (Twelve) Hours. Indications: Atrial Fibrillation 60 tablet 5    doxylamine (UNISOM) 25 MG tablet Take 1 tablet by mouth At Night As Needed for Sleep.      exemestane (AROMASIN) 25 MG tablet Take 1 tablet by mouth Daily. 30 tablet 3    metoprolol succinate XL (TOPROL-XL) 50 MG 24 hr tablet Take 2 tablets po q am and one tablet po q pm 270 tablet 3    polyethylene glycol 17 g packet Take 17 g by mouth 2 (Two) Times a Day As Needed (constipation).      tiZANidine (ZANAFLEX) 4 MG tablet Take 1 tablet by mouth Every 6 (Six) Hours As Needed for Muscle Spasms.      ribociclib succinate (Kisqali, 400 MG Dose,) 200 MG tablet therapy pack tablet Take 2 tablets by mouth Daily. Take for 21 days on then 7 days off (Patient not taking: Reported on 2/22/2024) 42 tablet 0    [DISCONTINUED] digoxin (LANOXIN) 125 MCG tablet Take 1 tablet by mouth Daily.       Current Facility-Administered Medications on File Prior to Visit   Medication Dose Route Frequency Provider Last Rate Last Admin    Chlorhexidine Gluconate Cloth 2 % pads 1 each  1 each Apply externally Take As Directed Luis M Leonard MD            ALLERGIES:    Allergies   Allergen Reactions    Benadryl [Diphenhydramine] Itching     INCREASES BLOOD PRESSURE    Erythromycin GI Intolerance    Levaquin [Levofloxacin] GI Intolerance    Penicillins GI Intolerance        Social History     Socioeconomic History    Marital status:      Spouse name: Cruz    Number of children: 0   Tobacco Use    Smoking status: Never    Smokeless tobacco: Never   Vaping Use    Vaping Use: Never used   Substance and Sexual Activity    Alcohol use: Yes     Comment: occasional    Drug use: No    Sexual activity: Not Currently     Partners: Male     Birth control/protection: Abstinence        Family History   Problem Relation Age of Onset    Lung cancer Father      "Irritable bowel syndrome Father     Cancer Mother     Breast cancer Mother     Liver disease Mother     Breast cancer Sister 48    Breast cancer Maternal Grandmother     Breast cancer Paternal Grandmother     Breast cancer Maternal Uncle     Malig Hyperthermia Neg Hx       Objective     Vitals:    02/22/24 1428   BP: 119/86   Pulse: 78   Resp: 18   Temp: 98 °F (36.7 °C)   TempSrc: Temporal   SpO2: 96%   Weight: 79.9 kg (176 lb 3.2 oz)   Height: 165.1 cm (65\")   PainSc: 0-No pain         2/22/2024     2:14 PM   Current Status   ECOG score 0     PHYSICAL EXAM:  GENERAL:  Well-developed, well-nourished in no acute distress.   SKIN:  Warm, dry without rashes, purpura or petechiae.  HEAD:  Normocephalic.  EYES:  Pupils equal, round.  EOMs intact.  Conjunctivae normal.  EARS:  Hearing intact.  NOSE:  Septum midline.  No excoriations or nasal discharge.  CHEST:  Lungs clear to auscultation. Good airflow.  CARDIAC:  Regular rate and rhythm without murmurs. Normal S1,S2.  ABDOMEN:  Soft, nontender with no organomegaly or masses. Bowel sounds present  EXTREMITIES:  No clubbing, cyanosis or edema.  NEUROLOGICAL: No focal neurological deficits.  PSYCHIATRIC:  Normal affect and mood.      Bilateral mastectomy sites were well healed with telangiectasias due to radiation therapy.     RECENT LABS:  Results from last 7 days   Lab Units 02/22/24  1417   WBC 10*3/mm3 3.35*   NEUTROS ABS 10*3/mm3 1.68*   HEMOGLOBIN g/dL 14.1   HEMATOCRIT % 43.3   PLATELETS 10*3/mm3 207     Results from last 7 days   Lab Units 02/22/24  1417   SODIUM mmol/L 140   POTASSIUM mmol/L 4.4   CHLORIDE mmol/L 102   CO2 mmol/L 29.3*   BUN mg/dL 21   CREATININE mg/dL 1.06*   CALCIUM mg/dL 9.6   ALBUMIN g/dL 4.7   BILIRUBIN mg/dL 0.3   ALK PHOS U/L 105   ALT (SGPT) U/L 141*   AST (SGOT) U/L 69*   GLUCOSE mg/dL 100*           Tissue Pathology Exam (01/15/2024 09:50)     Assessment & Plan    1.  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   For at least 4 years, she has had an in crescendo mass in the left breast that has produced a gigantic tumor that was close to 25 cm in size and is replacing most of the anatomy of the left breast. There are areas of ulceration necrosis and bleeding and the mass is fixed to the chest wall. She has abundant amount of collateral circulation in the left anterior chest wall and it caught my attention the lack of any left axillary adenopathy. She has no lymphedema in the left upper extremity. In the right breast while in sitting position, no palpable abnormalities are documented. The right breast skin and nipple are normal. When the patient lies flat, there is a palpable mass at 9 o'clock from the nipple that measures probably 4 cm in size, very indistinct in regard to edges and margins. There was no mobility and no areas of tenderness. She has a giant adenopathy in the right axilla that measures close to 9 cm in size, uniform, no fluctuation, nontender, completely fixed to the axillary wall. There is no compromise of the skin.   Patient completed 4 cycles of neoadjuvant AC on 4/12/2019.    Patient completed neoadjuvant weekly Taxol x 12 treatments on 7/19/2019.  9/16/2019 bilateral mastectomy by Dr. Joby Barron with axillary lymph node dissection.  No residual malignancy.  10/30/2019 patient's Mediport was removed in anticipation of radiation.  Radiation therapy under the care of Dr. Marmolejo 10/31/2019-1/13/2020 to the right chest wall.  Started on adjuvant Femara 2.5 mg daily 8/20/2019.  9/21/2020 continues on adjuvant Femara, tolerating it quite well.  Some mild hot flashes and mild arthralgias, all which are tolerable.  The patient returned to the office on 05/24/2021. Since the previous visit the patient proceeded with a PET scan and I have reviewed this with her in the PAC system showing chest wall on the left side area of enlightening SUV  activity that is almost 3 cm long x 7 mm wide. It is behind the bone. It is very close to the lower aspect of her heart. The reset of the PET scan discloses no activity. This is also specific in regard to the ovaries and actually an ultrasound of her vagina looking into the ovaries documented an unilocular cyst on the left side with typical features of benignity and a  was completely normal 5.5.   The patient was further reviewed on 07/07/2021 after she has continued her Kisqali and completion of the 1st round of the medicine.   The patient was further reviewed on 09/30/2021. Patient has recovered well from her left hip replacement. She completed her Kisqali a week ago. Her white count is low today. The hemoglobin is stable. The platelet count is stable. Elevation in her liver function test, SGOT, SGPT noted. Again, her hepatitis A, B and C serologies are negative.   In regard to her history of breast cancer she was reviewed on 11/19/2021. Since the previous visit the patient has had tumor marker CA 15-3 that has dropped to 20. Radiological assessment of her chest and abdomen CT scan that documents resolution of the epicardial lymphadenopathy in the right hemithorax, no pulmonary nodules or metastasis, no pleural effusion and no liver metastasis. No bone metastasis. Based on this information I do believe that the breast cancer is relatively quiet clinically, biochemically and radiologically and I advised her today to resume her Femara at the dose of 2.5 mg a day. The likelihood of Femara producing liver dysfunction is more than remote.   The patient was further reviewed on 12/29/2021. Persistent elevation of her SGOT, SGPT, and alkaline phosphatase. She has discontinued most of the medicines and I have no other choice but discontinue the Femara. Since then and actually today is the 1st day in many weeks that the SGOT and SGPT and alkaline phosphatase are improving. It seems that we are getting to the final  diagnosis that Femara is the culprit medicine in regard to the hepatitis induced by medication documented pathologically through a liver biopsy. That is extremely rare.   The patient was further reviewed on 03/07/2022. Since the previous visit the patient has stopped the Femara in 12/2021 and her liver function tests are completely back to normality.   05/09/2022 Her tumor marker CA 15-3 has remained normal and actually lower than ever and compliance with Aromasin has been 100% with excellent tolerance.   Given the circumstances of this I advised her to remain on Aromasin 25 mg a day and I find no use for this patient to go back onto Kisqali or medicines of this nature.   9/7/2022 CT Chest Abdomen Pelvis negative..  Enlarging right epicardial cardiophrenic angle lymph node.  Interval enlargement with right adnexal cyst.  Stable groundglass right upper lobe with decreased conspicuous nodules of the left upper lobe.  Aromasin continued.   12/28/2022 CA 15-3 normal at 18.3  6/27/2023 CA 15-3 normal at 18.5 Aromasin continued with no plans for radiographic assessment  On 01/18/2024, the patient was brought to the office to discuss the findings of the pathological report of her retrosternal mass that documents carcinoma of the breast, metastatic, ER positive, UT positive, HER2 negative. Further analysis of this by [facility] has been requested.    [facility] analysis of the tissue from her biopsy of the pericardium documents that the patient has a high tumor mutation burden that will make her applicable to receive immunotherapy in the future. It has been confirmed as well that she is HER2 negative, and ER/UT positive. This confirms the need for this patient to remain on her regimen of medicines, Kisqali and Aromasin to control her disease process.   On this basis, Dr. Dixon does not believe that the patient has now officially metastatic breast cancer to different sites, specifically inside of the chest. I do not see any options  in regard to surgical resection of this, neither radiation therapy because the heart is behind the tumoral site. Radiation therapy will be highly damaging to her heart, especially receiving anthracycline in the past. She had received Kisqali in the past with tremendous benefit and we will proceed with the management of this medicine at the dose of 200 mg twice a day. In anticipation of the use of this, the patient will have a magnesium supplement every day and some cashews every day and she will proceed with an EKG today to measure QT interval.   1/23/2024 baseline PET scan with enlarging intensely avid anterior pericardial soft tissue mass representing the patient's known malignancy.  Smaller adjacent cardio phrenic soft tissue nodules and/or lymph nodes are also larger compared to 9/7/2022.  Scattered bilateral subcentimeter pulmonary nodules.  No FDG avid disease in the neck.  Kisqali 200 mg 21 out of 28 days initiated  2/12/2024-day 18 of 21 Kisqali with elevated liver function studies, AST 61, .  Kisqali held  Today, 2/22/2024, the patient is due to resume her second cycle of Kisqali.  She is having extra 3 days off due to elevated liver function studies.  Today WBC improved to 3.35, ANC 1.68, AST 69, alkaline phosphatase 141.  Reviewed with Dr. Dixon and we will go ahead and proceed with Kisqali      2. Left hip pain.   05/17/2021: Patient reports new complaint of progressive discomfort and the inability to function given the pain in the left hip area. Now she is limping. The only time when she is not in discomfort with the left hip is when she is sitting or lying in bed or riding the horse when gravity takes away the pressure of her left hip area. Radiologically speaking the CT scan of the chest, abdomen and pelvis to my eyes disclosed no abnormalities besides a very simple ovarian cyst that measures close to 7 x 5 cm with no trabeculation. There is no pelvic ascites. There is no pelvic adenopathy.  The other abnormality obviously visible is the severe degree of scoliosis of the thoracic, lumbar spines.   It caught my attention the subchondral cyst in the left hip and the minimal if any space leftover between the head of the femur and the acetabulum. There is no metastasis in this anatomical site.   The radiologist mentioned cardiophrenic angle lymphadenopathy. I cannot see that from my naked eyes. I do not know what it is and I do not know how to express it. Pulmonary anatomy is normal. Hilar adenopathy is normal. No pericardial effusion. No pleural effusion. Minimal infiltrate in the lungs related to radiation changes. Liver anatomy, pancreas and kidneys were unremarkable. The scoliosis is very obvious in the view of the abdomen.   Patient refereed with Dr. Leonard, orthopedic surgeon.   Patient underwent left hip replacement and recovered well.   She currently denies pain in her hip      3. Ovarian cyst  05/17/2021: Her CA 15-3 was minimal elevated in comparison to previous assessment and pelvic ultrasound ordered. t raises the following question.   05/24/2021: This is also specific in regard to the ovaries and actually an ultrasound of her vagina looking into the ovaries documented an unilocular cyst on the left side with typical features of benignity and a  was completely normal 5.5.   6/27/2023: CA 15-3 is normal at 18.5.  On 09/27/2023 no issues pertinent to this. This will remain in observation.      4. 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   She remains on Aromasin and obviously this patient never will be back on letrozole under any circumstances. This was documented through liver biopsy and through removal of the medication that triggered quick improvement in her liver function test.   On 09/14/2022 there is no evidence of drug induced hepatitis. Aromasin will be continued. The patient is aware that she never will be able to take Femara.   On 10/25/2022 her liver enzymes are completely  normal today. Since discontinuation of Femara her drug induced hepatitis has resolved. This situation was extremely rare.   ON 12/28/2022 no issues pertinent to liver function after discontinuation of Femara months ago.   On 03/29/2023 waiting to review her liver function. All of the abnormalities resolved after stopping letrozole.   6/27/2023: Liver enzymes remain normal.   1/18/2024 liver function studies normal with AST 24, ALT 17  1/31/2024 AST 51, ALT 70  2/12/2024 AST 61, .  Kisqali held  2/22/2024 AST 69, , alkaline phosphatase 105, total bilirubin normal at 0.5.  Reviewed with Dr. Dixon and the patient will resume Kisqali.  Given the recurrence of her elevated LFTs, it certainly makes it suspicious Kisqali is the culprit        5. Septic Arthritis  The patient has developed an episode of septic arthritis in many joints including left shoulder and left ankle. She required antibiotic therapy as per recently. Infectious Disease was involved in this. In fact I discussed the case with them on the telephone. I suggested choosing the PICC line to be placed on the right arm in order to be able to deliver antibiotic therapy according to the proper indication. She completed the PICC line and it was removed last week with no complications or problems. The right upper extremity is completely normal. It is difficult for me to know why she developed the septic arthritis. She is in contact with horses all the time. The pathogen that she had in the joint is very uncommon in my opinion and the patient had no obvious source including no sinuses, no dental source, no obvious cardiac source, no gastrointestinal or genitourinary source and no skin source. It raises the question if this pathogen could evade to colonic source and I think I feel the obligation for this patient to have at least a CT scan of the abdomen and pelvis and eventually in several months from now consider a colonoscopy. Another consideration  could be a Cologuard in some way. At least the patient’s infection seems to be under control. Her joints are still sore today including left shoulder, left ankle. Local inflammatory signs are very much gone. The only area of inflammation that she has in the 1st MP joint on the left hand probably represents osteoarthritis, no more than that. She will remain in observation from this point of view.  On 09/14/2022 the patient has no symptoms that would suggest any further spreading or new development of septic arthritis. My fear was related to the possible seeding of her hip replacement areas by bacteria during the bacteremia that she had not too long ago. It seems that that is not the case. Radiologically speaking I do not see any local inflammation or reaction in any of these anatomical sites. There is perfect symmetry in the hip areas in regard to all her hardware material. Her sedimentation rate is BACK TO normal. Her white count is normal and this process will be watched in absence of any other intervention.  On 10/25/2022 no new episodes of septic arthritis. I measured her sedimentation rate during the previous visit that was down to 9, previously was in the 8-9 category. This means resolution of an inflammatory process altogether.   On 12/28/2022 at this time she is experiencing her typical symptom of osteoarthritis in her hands and hips but no issues pertinent to septic arthritis whatsoever. Deformities in the hands are ongoing due to osteoarthritis with no other issues or problems. No lymphedema in upper extremities.   On 03/29/2023 no significant sequela from her multiple foci and septic arthritis. What she has now is just typical osteoarthritis symptomatology and she uses Tylenol for this and occasional ibuprofen.   6/27/2023: Patient continues to report intermittent joint discomfort. Continues to manage pain with tylenol and occasional ibuprofen.   No issues pertinent to this.      6. Atrial  Fibrillation  The patient developed atrial fibrillation with rapid ventricular response during the hospitalization with septic arthritis. Echocardiogram documented very dilated left atrium. She is now receiving Eliquis, metoprolol, and digoxin. Her ventricular response is controlled today but she remains in AFib. She has requested a followup with Dr. Odell and I went ahead and made her an appointment. 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 If she had a fall and she is taking anticoagulants the only thing that we are going to have is just trouble. She has sold one of the horses. She will sell the other horse very soon and she will remain on land for the time being. Her  is back in town and he is helping her with consecution of groceries and things of this nature under the present circumstances. I advised her to minimize any trauma.  On 09/14/2022 the patient will be seen by Electrophysiology tomorrow in regard to consideration of cardioversion for her AFib. She has discussed this with Dr. Odell and I have reviewed this data. That is perfectly fine from my point of view. I find no form of contraindication from my side of the story if this is the methodology that is chosen to try to revert her to normal sinus rhythm.  On 10/25/2022 the patient is in normal sinus rhythm today. She remains on anticoagulants. She has cardiology appointment tomorrow. I asked her to buy a pulse oximeter and I taught her how to interpret the little wave curve of the pulse oximeter in regard to her heart rate. Typically patients in A-fib have very irregular pulse chaotic and the little wave curves will be continuously changing and besides the pulse rate will be continuously changing depending on the speed of the process. That is very simple to interpret. If se develops that problem I advised her to call her cardiologist.   On 12/28/2022 today she  is in normal sinus rhythm by cardiac auscultation. Echocardiogram to be done by Danni Odell MD, in the next few days and further recommendations at that point. We strongly advised the patient about the continuation of Eliquis.   6/27/2023: Patient continues to be followed by Dr. Odell and has a stress test next week with Dr. Goodman. Patient remains on Eliquis with good tolerance.   On 01/31/2024, her atrial fibrillation remains controlled. Her QT interval is still applicable and normal. QT interval has not been modified by Kisqali administration. She remains on magnesium supplement.      7. Grade 3 lymphedema in the left upper extremity   Patient's lymphedema in the left upper extremity continues. She already has an appointment to be seen by the lymphedema clinic, she will require a new sleeve and massage and interventional therapy for this. She is aware that she needs to avoid any trauma in the left upper extremity to minimize any cellulitis.   On 03/29/2023 the patient has no leftover lymphedema in either extremity.  6/27/2023: Stable.   Lymphedema in the left upper extremity on 09/27/2023 is grade 1 and has not required any sleeve usage or elastic bandage at this time.       8. Hypertension  In regard to hypertension management I have controlled this before. Now she is taking quite amount of metoprolol. I will give up the management of this and now that she will see Dr. Odell, they will provide the care of this issue. I would not be surprised if the patient in the long run needs to be receiving another blood pressure medication given the fact that her blood pressure is still marginally elevated today. Taking digoxin, I do not think Lasix or hydrochlorothiazide will be a good choice because of the potential for hypokalemia unless the potassium is replaced and monitored very close. This will probably minimize the potentiality for digoxin toxicity.  On 09/14/2022 her blood pressure is controlled with the metoprolol that  is provided by the cardiology team. She was advised again to avoid antiinflammatory medication for pain control.   On 10/25/2022 her blood pressure is under good control and I advised her to continue taking her blood pressure medication and once more I advised against the use of any antiinflammatory medication by oral route.   On 12/28/2023 her blood pressure has been spiking including to a 190 systolic today in the office of, Danni Odell MD. Amlodipine was given. Her blood pressure today here is going down. The patient is eating exaggerated amount of salt and on top of that she is taking antiinflammatory nonsteroidal medications at least 6 times a week, all of this in conjunction with raised blood pressure. I told her one more time she cannot take antiinflammatory medication, she can only take Tylenol. Dr. Danni Odell, will be adjusting her blood pressure medicine accordingly.       Plan:    Continue Aromasin 25 mg daily  Resume Kisqali 200 mg 21 out of 28 days  Liver function studies discussed and reviewed with Dr. Dixon today  Continue Eliquis 5 mg twice daily  Turn in 2 weeks for CBC, stat MD SLICK follow-up with Dr. Dixon and determination of further plan of care    Patient remains on high risk medication and requires close monitoring for toxicity.  Today's plan of care was discussed and reviewed with Dr. Dixon who is in agreement      Pamela Cowan, ISAIAS  02/22/2024

## 2024-02-28 ENCOUNTER — SPECIALTY PHARMACY (OUTPATIENT)
Dept: PHARMACY | Facility: HOSPITAL | Age: 64
End: 2024-02-28
Payer: MEDICARE

## 2024-02-28 NOTE — PROGRESS NOTES
MTM telephone encounter re:adherence and side effects (none)     Marylu reports starting Kisqali about 1 month ago. Thus far, patient denies side effects. . Thus far, no missed doses and no medication changes. Advised pt to call office if any changes. Patient expressed understanding and had no additional questions at this time.       Cathleen Redd, Pharmacy Intern  2/28/2024  09:42 EST

## 2024-03-08 ENCOUNTER — OFFICE VISIT (OUTPATIENT)
Dept: ONCOLOGY | Facility: CLINIC | Age: 64
End: 2024-03-08
Payer: MEDICARE

## 2024-03-08 ENCOUNTER — LAB (OUTPATIENT)
Dept: LAB | Facility: HOSPITAL | Age: 64
End: 2024-03-08
Payer: MEDICARE

## 2024-03-08 VITALS
HEART RATE: 68 BPM | WEIGHT: 175.1 LBS | DIASTOLIC BLOOD PRESSURE: 87 MMHG | TEMPERATURE: 97.8 F | OXYGEN SATURATION: 98 % | RESPIRATION RATE: 16 BRPM | BODY MASS INDEX: 29.17 KG/M2 | SYSTOLIC BLOOD PRESSURE: 118 MMHG | HEIGHT: 65 IN

## 2024-03-08 DIAGNOSIS — I89.0 LYMPHEDEMA OF UPPER EXTREMITY, BILATERAL: ICD-10-CM

## 2024-03-08 DIAGNOSIS — C50.812 MALIGNANT NEOPLASM OF OVERLAPPING SITES OF LEFT BREAST IN FEMALE, ESTROGEN RECEPTOR POSITIVE: ICD-10-CM

## 2024-03-08 DIAGNOSIS — M65.332 TRIGGER MIDDLE FINGER OF LEFT HAND: ICD-10-CM

## 2024-03-08 DIAGNOSIS — Z17.0 MALIGNANT NEOPLASM OF OVERLAPPING SITES OF LEFT BREAST IN FEMALE, ESTROGEN RECEPTOR POSITIVE: ICD-10-CM

## 2024-03-08 DIAGNOSIS — K71.6 DRUG-INDUCED HEPATITIS: ICD-10-CM

## 2024-03-08 DIAGNOSIS — Z17.0 MALIGNANT NEOPLASM OF OVERLAPPING SITES OF LEFT BREAST IN FEMALE, ESTROGEN RECEPTOR POSITIVE: Primary | ICD-10-CM

## 2024-03-08 DIAGNOSIS — T50.905A DRUG-INDUCED HEPATITIS: ICD-10-CM

## 2024-03-08 DIAGNOSIS — C50.812 MALIGNANT NEOPLASM OF OVERLAPPING SITES OF LEFT BREAST IN FEMALE, ESTROGEN RECEPTOR POSITIVE: Primary | ICD-10-CM

## 2024-03-08 DIAGNOSIS — Z79.899 ENCOUNTER FOR LONG-TERM (CURRENT) USE OF OTHER MEDICATIONS: ICD-10-CM

## 2024-03-08 LAB
ALBUMIN SERPL-MCNC: 4.4 G/DL (ref 3.5–5.2)
ALBUMIN/GLOB SERPL: 1.7 G/DL
ALP SERPL-CCNC: 107 U/L (ref 39–117)
ALT SERPL W P-5'-P-CCNC: 368 U/L (ref 1–33)
ANION GAP SERPL CALCULATED.3IONS-SCNC: 11.9 MMOL/L (ref 5–15)
AST SERPL-CCNC: 132 U/L (ref 1–32)
BASOPHILS # BLD AUTO: 0.04 10*3/MM3 (ref 0–0.2)
BASOPHILS NFR BLD AUTO: 1.3 % (ref 0–1.5)
BILIRUB SERPL-MCNC: 0.5 MG/DL (ref 0–1.2)
BUN SERPL-MCNC: 16 MG/DL (ref 8–23)
BUN/CREAT SERPL: 14.7 (ref 7–25)
CALCIUM SPEC-SCNC: 9.7 MG/DL (ref 8.6–10.5)
CANCER AG15-3 SERPL-ACNC: 21.4 U/ML
CHLORIDE SERPL-SCNC: 104 MMOL/L (ref 98–107)
CO2 SERPL-SCNC: 24.1 MMOL/L (ref 22–29)
CREAT SERPL-MCNC: 1.09 MG/DL (ref 0.57–1)
DEPRECATED RDW RBC AUTO: 51.4 FL (ref 37–54)
EGFRCR SERPLBLD CKD-EPI 2021: 56.1 ML/MIN/1.73
EOSINOPHIL # BLD AUTO: 0.05 10*3/MM3 (ref 0–0.4)
EOSINOPHIL NFR BLD AUTO: 1.6 % (ref 0.3–6.2)
ERYTHROCYTE [DISTWIDTH] IN BLOOD BY AUTOMATED COUNT: 14.8 % (ref 12.3–15.4)
GLOBULIN UR ELPH-MCNC: 2.6 GM/DL
GLUCOSE SERPL-MCNC: 101 MG/DL (ref 65–99)
HCT VFR BLD AUTO: 43.6 % (ref 34–46.6)
HGB BLD-MCNC: 14.1 G/DL (ref 12–15.9)
IMM GRANULOCYTES # BLD AUTO: 0.01 10*3/MM3 (ref 0–0.05)
IMM GRANULOCYTES NFR BLD AUTO: 0.3 % (ref 0–0.5)
LYMPHOCYTES # BLD AUTO: 0.96 10*3/MM3 (ref 0.7–3.1)
LYMPHOCYTES NFR BLD AUTO: 31.4 % (ref 19.6–45.3)
MCH RBC QN AUTO: 30.5 PG (ref 26.6–33)
MCHC RBC AUTO-ENTMCNC: 32.3 G/DL (ref 31.5–35.7)
MCV RBC AUTO: 94.2 FL (ref 79–97)
MONOCYTES # BLD AUTO: 0.16 10*3/MM3 (ref 0.1–0.9)
MONOCYTES NFR BLD AUTO: 5.2 % (ref 5–12)
NEUTROPHILS NFR BLD AUTO: 1.84 10*3/MM3 (ref 1.7–7)
NEUTROPHILS NFR BLD AUTO: 60.2 % (ref 42.7–76)
NRBC BLD AUTO-RTO: 0 /100 WBC (ref 0–0.2)
PLATELET # BLD AUTO: 215 10*3/MM3 (ref 140–450)
PMV BLD AUTO: 8.6 FL (ref 6–12)
POTASSIUM SERPL-SCNC: 4.4 MMOL/L (ref 3.5–5.2)
PROT SERPL-MCNC: 7 G/DL (ref 6–8.5)
RBC # BLD AUTO: 4.63 10*6/MM3 (ref 3.77–5.28)
SODIUM SERPL-SCNC: 140 MMOL/L (ref 136–145)
WBC NRBC COR # BLD AUTO: 3.06 10*3/MM3 (ref 3.4–10.8)

## 2024-03-08 PROCEDURE — 86300 IMMUNOASSAY TUMOR CA 15-3: CPT | Performed by: INTERNAL MEDICINE

## 2024-03-08 PROCEDURE — 85025 COMPLETE CBC W/AUTO DIFF WBC: CPT

## 2024-03-08 PROCEDURE — 80053 COMPREHEN METABOLIC PANEL: CPT

## 2024-03-08 NOTE — PROGRESS NOTES
Subjective     REASON FOR FOLLOW UP:  1. 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 R BREAST MASS WITH GIANT R AXILLARY ADENOPATHY.  SHE UNDERWENT DOSE DENSE AC, TAXOL, BILATERAL MASTECTOMIES, DRAMATIC NEAR COMPLETE KS, RADIATION THERAPY AND ADJUVANT FEMARA STOPPED ON 12/21 BECAUSE DRUG INDUCED HEPATITIS, SWITCHED TO AROMASIN 2/22: NO SIDE EFFECTS.    3. FAMILY HISTORY OF BREAST CANCER IN MOTHER AND SISTER, SISTER WAS TESTED FOR BRCA AND WAS NEGATIVE.    4. LEFT OVARIAN CYST , IT HAS BEEN PRESENT FOR LONG TIME BUT IS GETTING LARGER, ASYMPTOMATIC, NEED FOR , PELVIC US AND GYN ONC EVal: no need for any intervention                  5. LEFT HIP PAIN SEVERE ARTHRITIS, REAL NEED FOR LEFT HIP REPLACEMENT: PERFORMED 8/21    6. DRUG INDUCED HEPATITIS,  FINALLY STOPPED FEMARA: DRAMATIC IMPROVEMENT AND RESOLUTION.      History of present illness:    On 03/08/2024 this 64-year-old female who has metastatic breast cancer with a mass behind the sternum in front of the pericardium and undergoing therapy with Aromasin and Kisqali returns to the office. In the meantime she remains asymptomatic in regard to this issue with no chest pain, cough, pleuritic pain, hemoptysis or any other problem. She continues having occasional episodes of A-fib that is very obvious upon physical activity. No shortness of breath. Her blood pressure has remained stable. Her appetite has remained excellent. She is not drinking any alcohol. Bowel activity is normal, urination is normal. Joint pain especially in her left hand with trigger finger in the middle that is becoming a nuisance. The patient denies any other areas of pain besides the chronic pain in both hips and back pain especially. She finds herself useless this time of the year because she is not able to work at [employer] like she has done all of her life and she would like to volunteer. She is not depressed.     Her brother has come all of the  way from [place] for this visit today and is in her company.          Past Medical History:   Diagnosis Date    Anemia in neoplastic disease     Arthritis     Arthritis of back     Arthritis of neck     Breast cancer     Left    CVA (cerebral vascular accident)     Encounter for long-term (current) use of other medications 06/22/2021    Fracture, fibula     Fracture, finger     Frozen shoulder     Hip arthrosis 01/17    Hip pain     RIGHT HIP... CYST    History of fracture of leg 1987    History of radiation therapy     LAST TREATMENT      Hypertension     Knee swelling     Limited joint range of motion (ROM)     RIGHT HIP    Low back strain     Periarthritis of shoulder     Rotator cuff syndrome 6/22    Skin sore     OPEN SORE LEFT BREAST    Syncope     Vertigo            Past Surgical History:   Procedure Laterality Date    AXILLARY LYMPH NODE BIOPSY/EXCISION Right     LYMPH NODE UNDER RIGHT ARM-MALIGNANT (DOUBLE MASTECTOMY)    BREAST BIOPSY Left     MALIGNANT    FRACTURE SURGERY  1987    Leg    HARDWARE REMOVAL      INCISION AND DRAINAGE LEG Left 06/30/2022    Procedure: INCISION AND DRAINAGE left ankle;  Surgeon: Kenneth Tran MD;  Location: [facility];  Service: Orthopedics;  Laterality: Left;    JOINT REPLACEMENT Bilateral mar 2020,july 2021    hips    MASTECTOMY W/ SENTINEL NODE BIOPSY Bilateral 09/16/2019    Procedure: BILATERLA MODIFIED RADICAL MASTECTOMY WITH BILATERAL SENTINEL LYMPH NODE BIOPSY;  Surgeon: Joby Barron Jr., MD;  Location: [facility];  Service: General    TOTAL HIP ARTHROPLASTY Right 03/02/2020    Procedure: RIGHT TOTAL HIP ARTHROPLASTY NATALY NAVIGATION;  Surgeon: Luis M Leonard MD;  Location: [facility];  Service: Orthopedics;  Laterality: Right;    TOTAL HIP ARTHROPLASTY Left 07/20/2021    Procedure: Posterior LEFT TOTAL HIP ARTHROPLASTY NATALY NAVIGATION;  Surgeon: Luis M Leonard MD;  Location: [facility];  Service: Orthopedics;  Laterality: Left;     VENOUS ACCESS DEVICE (PORT) INSERTION Right 02/01/2019    Procedure: INSERTION VENOUS ACCESS DEVICE;  Surgeon: Joby Barron Jr., MD;  Location: [facility] OR [facility];  Service: General    VENOUS ACCESS DEVICE (PORT) REMOVAL N/A 10/30/2019    Procedure: Mediport Removal;  Surgeon: Joby Barron Jr., MD;  Location: [facility] MAIN OR;  Service: General        Current Outpatient Medications on File Prior to Visit   Medication Sig Dispense Refill    acetaminophen (TYLENOL) 325 MG tablet Take 2 tablets by mouth Every 6 (Six) Hours As Needed.      amLODIPine (NORVASC) 2.5 MG tablet Take 1 tablet by mouth Daily. 30 tablet 11    apixaban (ELIQUIS) 5 MG tablet tablet Take 1 tablet by mouth Every 12 (Twelve) Hours. Indications: Atrial Fibrillation 60 tablet 5    doxylamine (UNISOM) 25 MG tablet Take 1 tablet by mouth At Night As Needed for Sleep.      metoprolol succinate XL (TOPROL-XL) 50 MG 24 hr tablet Take 2 tablets po q am and one tablet po q pm 270 tablet 3    polyethylene glycol 17 g packet Take 17 g by mouth 2 (Two) Times a Day As Needed (constipation).      ribociclib succinate 200 MG tablet therapy pack tablet Take 2 tablets by mouth Daily. Take for 21 days on then 7 days off 42 tablet 1    tiZANidine (ZANAFLEX) 4 MG tablet Take 1 tablet by mouth Every 6 (Six) Hours As Needed for Muscle Spasms.      [DISCONTINUED] exemestane (AROMASIN) 25 MG tablet Take 1 tablet by mouth Daily. 30 tablet 3    [DISCONTINUED] digoxin (LANOXIN) 125 MCG tablet Take 1 tablet by mouth Daily.       Current Facility-Administered Medications on File Prior to Visit   Medication Dose Route Frequency Provider Last Rate Last Admin    Chlorhexidine Gluconate Cloth 2 % pads 1 each  1 each Apply externally Take As Directed Luis M Leonard MD            ALLERGIES:    Allergies   Allergen Reactions    Benadryl [Diphenhydramine] Itching     INCREASES BLOOD PRESSURE    Erythromycin GI Intolerance    Levaquin [Levofloxacin] GI Intolerance    Penicillins GI  "Intolerance        Social History     Socioeconomic History    Marital status:      Spouse name: Cruz    Number of children: 0   Tobacco Use    Smoking status: Never    Smokeless tobacco: Never   Vaping Use    Vaping status: Never Used   Substance and Sexual Activity    Alcohol use: Yes     Comment: occasional    Drug use: No    Sexual activity: Not Currently     Partners: Male     Birth control/protection: Abstinence        Family History   Problem Relation Age of Onset    Lung cancer Father     Irritable bowel syndrome Father     Cancer Mother     Breast cancer Mother     Liver disease Mother     Breast cancer Sister 48    Breast cancer Maternal Grandmother     Breast cancer Paternal Grandmother     Breast cancer Maternal Uncle     Malig Hyperthermia Neg Hx             Objective     Vitals:    03/08/24 1015   BP: 118/87   Pulse: 68   Resp: 16   Temp: 97.8 °F (36.6 °C)   TempSrc: Temporal   SpO2: 98%   Weight: 79.4 kg (175 lb 1.6 oz)   Height: 165.1 cm (65\")   PainSc: 0-No pain             3/8/2024    10:09 AM   Current Status   ECOG score 0     Exam:               GENERAL:  Well-developed, Patient  in no acute distress.   SKIN:  Warm, dry ,NO purpura ,no rash.  HEENT:  Pupils were equal and reactive to light and accomodation, conjunctivae noninjected,  normal visual acuity.   NECK:  Supple with good range of motion; no thyromegaly , no JVD or bruits,.No carotid artery pain, no carotid abnormal pulsation   LYMPHATICS:  No cervical, NO supraclavicular, NO axillary, NO inguinal adenopathies.  CARDIAC   normal rate , irregular rhythm, without murmur,NO rubs NO S3 NO S4   LUNGS: normal breath sounds bilateral, no wheezing, NO rhonchi, NO crackles ,NO rubs.  VASCULAR VENOUS: no cyanosis, NO collateral circulation, NO varicosities, NO edema, NO palpable cords, NO pain,NO erythema, NO pigmentation of the skin.  ABDOMEN:  Soft, NO pain,no hepatomegaly, no splenomegaly,no masses, no ascites, no collateral " circulation,no distention.  EXTREMITIES  AND SPINE:  No clubbing, no cyanosis ,no deformities , no pain .No kyphosis,  no pain in spine, no pain in ribs , no pain in pelvic bone.  NEUROLOGICAL:  Patient was awake, alert, oriented to time, person and place.        RECENT LABS:  Hematology Results from last 7 days   Lab Units 03/08/24  1006   WBC 10*3/mm3 3.06*   NEUTROS ABS 10*3/mm3 1.84   HEMOGLOBIN g/dL 14.1   HEMATOCRIT % 43.6   PLATELETS 10*3/mm3 215     Results from last 7 days   Lab Units 03/08/24  1006   SODIUM mmol/L 140   POTASSIUM mmol/L 4.4   CHLORIDE mmol/L 104   CO2 mmol/L 24.1   BUN mg/dL 16   CREATININE mg/dL 1.09*   CALCIUM mg/dL 9.7   ALBUMIN g/dL 4.4   BILIRUBIN mg/dL 0.5   ALK PHOS U/L 107   ALT (SGPT) U/L 368*   AST (SGOT) U/L 132*   GLUCOSE mg/dL 101*                    Assessment & Plan    1.  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   For at least 4 years, she has had an in crescendo mass in the left breast that has produced a gigantic tumor that was close to 25 cm in size and is replacing most of the anatomy of the left breast. There are areas of ulceration necrosis and bleeding and the mass is fixed to the chest wall. She has abundant amount of collateral circulation in the left anterior chest wall and it caught my attention the lack of any left axillary adenopathy. She has no lymphedema in the left upper extremity. In the right breast while in sitting position, no palpable abnormalities are documented. The right breast skin and nipple are normal. When the patient lies flat, there is a palpable mass at 9 o'clock from the nipple that measures probably 4 cm in size, very indistinct in regard to edges and margins. There was no mobility and no areas of tenderness. She has a giant adenopathy in the right axilla that measures close to 9 cm in size, uniform, no fluctuation, nontender, completely  fixed to the axillary wall. There is no compromise of the skin.   Patient completed 4 cycles of neoadjuvant AC on 4/12/2019.    Patient completed neoadjuvant weekly Taxol x 12 treatments on 7/19/2019.  9/16/2019 bilateral mastectomy by Dr. Joby Barron with axillary lymph node dissection.  No residual malignancy.  10/30/2019 patient's Mediport was removed in anticipation of radiation.  Radiation therapy under the care of Dr. Marmolejo 10/31/2019-1/13/2020 to the right chest wall.  Started on adjuvant Femara 2.5 mg daily 8/20/2019.  9/21/2020 continues on adjuvant Femara, tolerating it quite well.  Some mild hot flashes and mild arthralgias, all which are tolerable.  The patient returned to the office on 05/24/2021. Since the previous visit the patient proceeded with a PET scan and I have reviewed this with her in the PAC system showing chest wall on the left side area of enlightening SUV activity that is almost 3 cm long x 7 mm wide. It is behind the bone. It is very close to the lower aspect of her heart. The reset of the PET scan discloses no activity. This is also specific in regard to the ovaries and actually an ultrasound of her vagina looking into the ovaries documented an unilocular cyst on the left side with typical features of benignity and a  was completely normal 5.5.   The patient was further reviewed on 07/07/2021 after she has continued her Kisqali and completion of the 1st round of the medicine. Today her EKG disclosed a normal QT interval and this has been sent to Cardiology to be reported officially. 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 She has completed her radiation therapy to the epicardial right lymph node with no difficulties or side effects.   The patient was further reviewed on 09/30/2021. Patient has recovered well from her left hip replacement. She completed her Kisqali a week ago. Her white count is low today. The hemoglobin is stable. The platelet  count is stable. Elevation in her liver function test, SGOT, SGPT noted. The patient is not drinking any alcohol. She is not taking any cholesterol medicine. She is not taking any anti-inflammatory medication and leads me to think that Kisqali is the culprit of this. Given these findings we advised the patient to put the Kisqali on hold until we recheck chemistry profile on weekly basis for the next 3 or 4 weeks. We need to settle these numbers down before she continues the Kisqali. She probably will require dose adjustment at some point. Again, her hepatitis A, B and C serologies are negative.   n regard to her history of breast cancer she was reviewed on 11/19/2021. Since the previous visit the patient has had tumor marker CA 15-3 that has dropped to 20. Radiological assessment of her chest and abdomen CT scan that documents resolution of the epicardial lymphadenopathy in the right hemithorax, no pulmonary nodules or metastasis, no pleural effusion and no liver metastasis. No bone metastasis. Based on this information I do believe that the breast cancer is relatively quiet clinically, biochemically and radiologically and I advised her today to resume her Femara at the dose of 2.5 mg a day. The likelihood of Femara producing liver dysfunction is more than remote.   The patient was further reviewed on 12/29/2021. Recent review of her liver function tests a few days ago documented persistent elevation of her SGOT, SGPT, and alkaline phosphatase. She has discontinued most of the medicines and I have no other choice but discontinue the Femara. Since then and actually today is the 1st day in many weeks that the SGOT and SGPT and alkaline phosphatase are improving. The patient actually remains asymptomatic in this regard. Her liver is not enlarged. She has no jaundice. She has no rash, no skin lesions and no other new alterations. That is good news. Her white blood count, hemoglobin and platelets remain stable. It seems  that we are getting to the final diagnosis that Femara is the culprit medicine in regard to the hepatitis induced by medication documented pathologically through a liver biopsy. That is extremely rare. As I posted above, I discussed with all my partners in regard to how many times through their careers prescribing Femara to multiple breast cancers they have seen Femara triggering hepatitis, none of them gave me a positive answer. I reviewed this information in [employer] and it is reported in less than 1% of patients taking this medication. I think we are in front of a rare situation but I want for her to withhold any further Femara treatment for the time being and I want to review her back in 3 weeks. If the liver function tests continue improving or normalize by then maybe we will have the answer once and for all. Raises the question what we will do next and I think the next medicine will be the utilization of Aromasin that has a different chemical component and different methodology for action. I made her aware of that. We are not going to go back in giving her Kisqali under the present circumstances given the confusion and all the issues pertinent to her liver dysfunction.   The patient was further reviewed on 03/07/2022. Since the previous visit the patient has stopped the Femara in 12/2021 and today her liver function tests are completely back to normality. This proves the point that Femara was the culprit phenomenon that I never have seen and discussed with my partner, Danelle Cespedes MD. He never has seen this neither.   The patient returned on 05/09/2022 with no symptoms of anything in particular besides minimal respiratory allergies and pain in the left hip associated with her previous surgery. Her clinical examination today is very much unremarkable, she looks fantastic. She has no symptoms or signs of breast cancer recurrence. White count, hemoglobin and platelets are normal. Her tumor marker CA 15-3 has  remained normal and actually lower than ever and compliance with Aromasin has been 100% with excellent tolerance.   Given the circumstances of this I advised her to remain on Aromasin 25 mg a day and I find no use for this patient to go back onto Kisqali or medicines of this nature.   On 08/16/2022 we reviewed the patient in regard to her history of breast cancer. Clinically she has not had any obvious recurrent disease in the chest wall in the lung anatomy or abdomen or pelvis. She remains on Aromasin adjuvantly and we are waiting to review tumor marker. She has a chest wall that is completely flat due to previous mastectomies and radiation therapy. There is no axillary adenopathies and the patient has in my opinion control of her disease process as far as we can tell. I advised her to remain on Aromasin 25 mg a day. I went ahead and scheduled a visit with us in a few weeks in order to further review radiological analysis that will be discussed below.  On 09/14/2022 the patient has had in the interim a CT scan of the chest, abdomen and pelvis for review personally. Her pulmonary anatomy remains normal, there is no pulmonary congestion, pleural effusions, pericardial effusion, cardiomegaly, hilar or mediastinal adenopathy. There is prepericardiac lymph node measuring almost 1.5 to 2 cm in size that is flat like a small finger that has been present before and has minimally enlarged. If this has anything to do with her previous history of breast cancer is difficult to state but this has been evaluated before with similarity in regards size and some degree of fluctuation. I think this does not constrain me from thinking with a normal tumor marker of CA 15-3 of 20 during the previous visit or compel to perform either removal or biopsy at this time or proceed with radiological assessment with a PET scan. I do believe that this is not representation of anything in particular. Her liver anatomy and the rest of the bony  anatomy, pancreas, kidneys, spleen and retroperitoneum remain unchanged. Given this finding I advised the patient to proceed and continue her Aromasin without the need to add any other medication to her regimen of medication especially in the background of A-fib. I advised her to proceed with reassessment with me in 6 weeks with a repeat CBC, CMP and CA 15-3. In that moment also we will obtain a liquid biopsy for further analysis. Her liver function test has remained normal and Aromasin has not produced any chemical hepatitis. I advised her again to remain on this medicine by itself.   On 10/25/2022 the patient remains on Aromasin. She has no symptoms or signs that would indicate breast cancer recurrence and the latest tumor marker CA 15-3 was 20 two months ago. We have another one pending today. Given these circumstances I advised her to remain on Aromasin for the time being. I find no need for radiological assessment at this time. She will be called at home with the report of her tumor marker.   On 12/28/2022 the patient had no symptoms or signs of breast cancer progression or recurrence on any level. Tolerance to Aromasin is excellent with no side effects and she has 100% compliance on the medication. She was advised to remain on the medication. Her tumor marker CA 15-3 has remained normal. No plans for radiological assessment unless new clinical changes occur or tumor marker changes.   On 03/29/2023 the patient has no symptoms or signs of breast cancer recurrence. She continues taking her aromatase inhibitor medicine on a routine basis with 100% compliance and no significant side effects from the medication. Clinically she has no evidence of breast cancer recurrence and during the previous visit her CA 15-3 remained normal, another one is pending today that will be discussed with her on the telephone once it becomes available. Given the stability of her clinical picture and the excellent clinical status I  advised the patient to remain on her medicine for the time being returning to see us back every 3 months with a CBC, CMP and CA 15-3. I find no need for radiological assessment on her at this point.   6/27/2023: Patient reviewed back today in office. She does not have any clinical findings suggestive of malignancy reoccurrence. She remains on Aromasin daily. Labs reviewed. CBC and CMP are both normal. CA 15-3 is normal at 18.5. Patient encouraged to become more active to help with weight gain and arthritis.   On 09/27/2023 the patient was further reviewed. She has no symptoms or signs of breast cancer recurrence. Her chest wall is unremarkable, the lymphedema in the left upper extremity is a grade 1 at the most controlled and she has no need for any elastic support. She has not developed any cellulitis or infection in the left upper extremity. The patient is taking Aromasin adjuvantly 100% compliance, no evidence of recurrent disease. Normal white count, hemoglobin and platelets pending chemistry profile. We advised her to remain on Aromasin 25 mg a day. She will be returning to see us back in 3 months with repeat CBC, CMP and CA 15-3. No need for radiological assessment on her at this point.    On 12/28/2023 no symptoms or signs of breast cancer recurrence. My conversation with, Danni Odell MD, today took place in regard to the potentiality for pleuritic pain on the right side. Lung auscultation is normal and the patient has no symptoms. This pain comes and goes very sporadically and is not bothering her on routine basis. Knowing that she has had a minimal pericardial alteration before we opted to proceed with a CT angiogram of the chest that will be done tomorrow and we will review this with the patient at some point on the telephone in the next 48 hours or so.     If we assume that her cancer marker CA 15-3 remains stable the patient will remain on Aromasin and we will review her radiological analysis again.  Otherwise plan to review her back in 3 months with repeat CBC, CMP and CA 15-3.     On 01/18/2024, the patient was brought to the office to discuss the findings of the pathological report of her retrosternal mass that documents carcinoma of the breast, metastatic, ER positive, AL positive, HER2 negative. Further analysis of this by Clara has been requested.     On this basis, I do believe that the patient has now officially metastatic breast cancer to different sites, specifically inside of the chest. I do not see any options in regard to surgical resection of this, neither radiation therapy because the heart is behind the tumoral site. Radiation therapy will be highly damaging to her heart, especially receiving anthracycline in the past. She had received Kisqali in the past with tremendous benefit and we will proceed with the management of this medicine at the dose of 200 mg twice a day. In anticipation of the use of this, the patient will have a magnesium supplement every day and some cashews every day and she will proceed with an EKG today to measure QT interval. This will be checked back in a couple of weeks.     The patient will require laboratory assessment every 2 weeks to monitor white count, hemoglobin and platelets in regard to Kisqali utilization.     I went ahead and requested a PET scan to be done as early as next week. The patient will have formal education and consent for Kisqali and the medicine will be delivered to her house to initiate this at some point probably in the middle of next week.     The patient will have the medication utilization at least every 6-8 weeks, repeat radiological assessment then and then decide how to proceed. We now know that her tumor marker is not useful to follow up on her disease process.    On 01/31/2024, the patient has not had any side effects of Kisqali administration along with Aromasin. Kisqali will continue at the present dose and the present schedule. We will  continue rechecking her every couple of weeks and I will review her back in 4 weeks. Today her EKG shows atrial fibrillation with controlled ventricular response and a QT interval that remains stable, unchanged from previous EKG. Formal request in regard to EKG interpretation has been done.    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 She has remained on her Aromasin and Kisqali and she is in the 2nd week of the 2nd batch of Kisqali at this time. She has not encountered any nausea or vomiting. She has not had any cardiac irregularity. She is constipated.     Her clinical examination is very benign. Her white count is low expected from the Kisqali with a normal hemoglobin and a normal platelet count. Her chemistry profile is profoundly abnormal, raises the question if now she has hepatitis induced by the Aromasin. 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 I went ahead and discontinued the Aromasin at this time and allowed her to continue the Kisqali until completion and she will require reassessment with laboratory testing on weekly basis from now on until we see what tendency her liver function test is going to go. If the numbers improve discontinuing the Aromasin the next step will be to deliver her therapy with Faslodex once a month, obviously with typical loading dose first. If the liver function test does not improve we will need to discontinue most of her medicines and reassess the status and figure out medicine by medicine one by one which is the one that is producing the hepatitis. Again the patient is not drinking any alcohol.    I went ahead and scheduled a CT scan of the chest to be done in 3 weeks from now and review with her in 4 weeks repeating CBC and CMP at that time. Her CA 15-3 has become useless in the follow up of her disease process. In the Next Generation Sequencing of the tissue documented from the chest biopsy unfortunately there is no other  actionable mutation.       2. Left hip pain.   05/17/2021: Patient reports new complaint of progressive discomfort and the inability to function given the pain in the left hip area. Now she is limping. The only time when she is not in discomfort with the left hip is when she is sitting or lying in bed or riding the horse when gravity takes away the pressure of her left hip area. Radiologically speaking the CT scan of the chest, abdomen and pelvis to my eyes disclosed no abnormalities besides a very simple ovarian cyst that measures close to 7 x 5 cm with no trabeculation. There is no pelvic ascites. There is no pelvic adenopathy. The other abnormality obviously visible is the severe degree of scoliosis of the thoracic, lumbar spines.   It caught my attention the subchondral cyst in the left hip and the minimal if any space leftover between the head of the femur and the acetabulum. There is no metastasis in this anatomical site.   The radiologist mentioned cardiophrenic angle lymphadenopathy. I cannot see that from my naked eyes. I do not know what it is and I do not know how to express it. Pulmonary anatomy is normal. Hilar adenopathy is normal. No pericardial effusion. No pleural effusion. Minimal infiltrate in the lungs related to radiation changes. Liver anatomy, pancreas and kidneys were unremarkable. The scoliosis is very obvious in the view of the abdomen.   Patient refereed with Dr. Leonard, orthopedic surgeon.   Patient underwent left hip replacement and recovered well.   On 09/27/2023 her left hip pain is very minimal at this time, she is not limping. 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 Her osteoarthritis in her hands is still a prevalent issue with aches and pains periodically. No need for pain medicine at this time besides Tylenol.     On 12/28/2023 she has arthritic pain in multiple sites in her back, in her hips, in her  knees, in her hands. She takes now as per my advice Tylenol and I told her absolutely no antiinflammatory medications whatsoever.     On 01/18/2024, no issues pertinent.    On 01/31/2024, no bone pain at this time .    On 03/08/2024 pain intermittently in the hip depending on the time of the day and activity at home. Requiring Tylenol occasionally. She is not taking any antiinflammatory medication and she is not taking any excessive amount of Tylenol at all.       3. Ovarian cyst  05/17/2021: Her CA 15-3 was minimal elevated in comparison to previous assessment and pelvic ultrasound ordered. t raises the following question.   05/24/2021: This is also specific in regard to the ovaries and actually an ultrasound of her vagina looking into the ovaries documented an unilocular cyst on the left side with typical features of benignity and a  was completely normal 5.5.   6/27/2023: CA 15-3 is normal at 18.5.  On 09/27/2023 no issues pertinent to this. This will remain in observation.    On 12/28/2023 this is asymptomatic. No issues pertinent to this at this time.     On 01/18/2024, no issues pertinent.    On 01/31/2024, her ovarian cyst has not changed radiologically in size. This will be left alone for the time being.    On 03/08/2024 ovarian cyst is not an issue at this time.      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   Her liver enzymes are completely normal today. She remains on Aromasin and obviously this patient never will be back on letrozole under any circumstances. This was documented through liver biopsy and through removal of the medication that triggered quick improvement in her liver function test.   On 09/14/2022 there is no evidence of drug induced hepatitis. Aromasin will be continued. The patient is aware that she never will be able to take Femara.   On 10/25/2022 her liver enzymes are completely normal today. Since discontinuation of Femara her drug induced hepatitis has resolved. This situation  was extremely rare.   ON 12/28/2022 no issues pertinent to liver function after discontinuation of Femara months ago.   On 03/29/2023 waiting to review her liver function. All of the abnormalities resolved after stopping letrozole.   6/27/2023: Liver enzymes remain normal.   No issues pertinent to this. She remains is observation.    On 12/28/2023 her liver function test is completely normal today. Aromasin is not producing any issues.    On 01/18/2024, no issues pertinent.    On 01/31/2024, no abnormalities in liver function test related to Femara because this medicine was discontinued time ago. Minimal alteration in liver function test related to Kisqali today with no implications.    On 03/08/2024 her liver function tests are very abnormal today. She is not drinking any alcohol, she is not taking any antiinflammatory medication or excessive Tylenol. My gut feeling is that it is going to be the Aromasin as the offender similar to what the Femara was the offender. If that is the case I will go ahead and stop Aromasin today and repeat laboratory testing on a weekly basis CMP until we see what the trend of her numbers will be. If that is not sufficient to make the liver function test to improve we will need to discontinue the Kisqali and keep working on discontinuation of medicines until we find the culprit.    Planning eventually to replace the Aromasin for Faslodex depending on the circumstances.       5. Septic Arthritis  The patient has developed an episode of septic arthritis in many joints including left shoulder and left ankle. She required antibiotic therapy as per recently. Infectious Disease was involved in this. In fact I discussed the case with them on the telephone. I suggested choosing the PICC line to be placed on the right arm in order to be able to deliver antibiotic therapy according to the proper indication. She completed the PICC line and it was removed last week with no complications or problems.  The right upper extremity is completely normal. It is difficult for me to know why she developed the septic arthritis. She is in contact with horses all the time. The pathogen that she had in the joint is very uncommon in my opinion and the patient had no obvious source including no sinuses, no dental source, no obvious cardiac source, no gastrointestinal or genitourinary source and no skin source. It raises the question if this pathogen could evade to colonic source and I think I feel the obligation for this patient to have at least a CT scan of the abdomen and pelvis and eventually in several months from now consider a colonoscopy. Another consideration could be a Cologuard in some way. At least the patient’s infection seems to be under control. Her joints are still sore today including left shoulder, left ankle. Local inflammatory signs are very much gone. The only area of inflammation that she has in the 1st MP joint on the left hand probably represents osteoarthritis, no more than that. She will remain in observation from this point of view.  On 09/14/2022 the patient has no symptoms that would suggest any further spreading or new development of septic arthritis. My fear was related to the possible seeding of her hip replacement areas by bacteria during the bacteremia that she had not too long ago. It seems that that is not the case. Radiologically speaking I do not see any local inflammation or reaction in any of these anatomical sites. There is perfect symmetry in the hip areas in regard to all her hardware material. Her sedimentation rate is BACK TO normal. Her white count is normal and this process will be watched in absence of any other intervention.  On 10/25/2022 no new episodes of septic arthritis. I measured her sedimentation rate during the previous visit that was down to 9, previously was in the 8-9 category. This means resolution of an inflammatory process altogether.   On 12/28/2022 at this time  she is experiencing her typical symptom of osteoarthritis in her hands and hips but no issues pertinent to septic arthritis whatsoever. Deformities in the hands are ongoing due to osteoarthritis with no other issues or problems. No lymphedema in upper extremities.   On 03/29/2023 no significant sequela from her multiple foci and septic arthritis. What she has now is just typical osteoarthritis symptomatology and she uses Tylenol for this and occasional ibuprofen.   6/27/2023: Patient continues to report intermittent joint discomfort. Continues to manage pain with tylenol and occasional ibuprofen.   No issues pertinent to this.    On 12/28/2023 there is no evidence of recurrence of septic arthritis at this time.    On 01/18/2024, no issues pertinent.    On 01/31/2024, no septic arthritis symptomatology at this point.    On 03/08/2024 no evidence of septic arthritis but now she has a trigger finger middle on the left hand that is becoming a nuisance. She has the significant degenerative changes associated with osteoarthritis in both hands but I think if we correct this trigger finger she will be able to function better. Referral made to be seen by, Junior Delgado MD, Rheumatology.        6. Atrial Fibrillation  The patient developed atrial fibrillation with rapid ventricular response during the hospitalization with septic arthritis. Echocardiogram documented very dilated left atrium. She is now receiving Eliquis, metoprolol, and digoxin. Her ventricular response is controlled today but she remains in AFib. She has requested a followup with Dr. Odell and I went ahead and made her an appointment. 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 If she had a fall and she is taking anticoagulants the only thing that we are going to have is just trouble. She has sold one of the horses. She will sell the other horse very soon and she will remain  on land for the time being. Her  is back in town and he is helping her with consecution of groceries and things of this nature under the present circumstances. I advised her to minimize any trauma.  On 09/14/2022 the patient will be seen by Electrophysiology tomorrow in regard to consideration of cardioversion for her AFib. She has discussed this with Dr. Odell and I have reviewed this data. That is perfectly fine from my point of view. I find no form of contraindication from my side of the story if this is the methodology that is chosen to try to revert her to normal sinus rhythm.  On 10/25/2022 the patient is in normal sinus rhythm today. She remains on anticoagulants. She has cardiology appointment tomorrow. I asked her to buy a pulse oximeter and I taught her how to interpret the little wave curve of the pulse oximeter in regard to her heart rate. Typically patients in A-fib have very irregular pulse chaotic and the little wave curves will be continuously changing and besides the pulse rate will be continuously changing depending on the speed of the process. That is very simple to interpret. If se develops that problem I advised her to call her cardiologist.   On 12/28/2022 today she is in normal sinus rhythm by cardiac auscultation. Echocardiogram to be done by Danni Odell MD, in the next few days and further recommendations at that point. We strongly advised the patient about the continuation of Eliquis.   On 03/29/2023 clinically her heart obeys to normal sinus rhythm.   6/27/2023: Patient continues to be followed by Dr. Odell and has a stress test next week with Dr. Goodman. Patient remains on Eliquis with good tolerance.   On 09/27/2023 the patient has been in A-fib since the time that she was seen by, Danni Odell MD, at the time that she had cardiac stress testing. Her A-fib is under control today with medications in regard to rate but she raised the question when, Manjeet Gustafson MD, is going to see her. I  do not know that question but I will go ahead and request an appointment and send him a note.     On 12/28/2023 the patient is back into atrial fibrillation. Dr. Danni Odell, is controlling ventricular response and the patient is on Eliquis with no embolic phenomenon.     On 01/18/2024, no new issues pertinent to this. She continues being monitored by Dr. Odell.     On 01/31/2024, her atrial fibrillation remains controlled. Her QT interval is still applicable and normal. QT interval has not been modified by Kisqali administration. She remains on magnesium supplement.    On 03/08/2024 she remains in atrial fibrillation clinically with controlled ventricular response, no congestive heart failure, no angina. She remains on anticoagulant. No clinical bleeding.      7. Grade 3 lymphedema in the left upper extremity    I went and made an urgent referral to the Lymphedema Clinic today to see if further bandage and drainage of this and massage would be helpful to her in the long run to control the problem. I still advised her to be extremely careful in regard to any injury or lesions in the skin of the left upper extremity to minimize any potentiality of cellulitis. In the long run if she has any episodes she will need to be back on antibiotics right away.   On 09/14/2022 the patient's lymphedema in the left upper extremity continues. She already has an appointment to be seen by the lymphedema clinic, she will require a new sleeve and massage and interventional therapy for this. She is aware that she needs to avoid any trauma in the left upper extremity to minimize any cellulitis. I strongly believe as posted before that septic arthritis is part of her lymphedema issues.   On 10/25/2022 her lymphedema in the left upper extremity is very much resolved with the sleeve and all of the manipulation that she has had in the lymphedema clinic. I advised her to be very prudent in regard doing any nicking in the skin and damaging the skin  pertinent to the left upper extremity to minimize cellulitis and potentiality for further problems.   On 12/28/2022 no lymphedema in either extremity. No need for sleeve use at this time.   On 03/29/2023 the patient has no leftover lymphedema in either extremity.  6/27/2023: Stable.   Lymphedema in the left upper extremity on 09/27/2023 is grade 1 and has not required any sleeve usage or elastic bandage at this time.     On 12/28/2023 her lymphedema in the left upper extremity is very minimal grade 1 at this point. No secondary.    On 01/18/2024, probably a grade 1 lymphedema in the left upper extremity.    On 01/31/2024, her lymphedema in the left upper extremity is gone.    On 03/08/2024 no lymphedema in either extremity at this time.        8. Hypertension  In regard to hypertension management I have controlled this before. Now she is taking quite amount of metoprolol. I will give up the management of this and now that she will see Dr. Odell, they will provide the care of this issue. I would not be surprised if the patient in the long run needs to be receiving another blood pressure medication given the fact that her blood pressure is still marginally elevated today. Taking digoxin, I do not think Lasix or hydrochlorothiazide will be a good choice because of the potential for hypokalemia unless the potassium is replaced and monitored very close. This will probably minimize the potentiality for digoxin toxicity.  On 09/14/2022 her blood pressure is controlled with the metoprolol that is provided by the cardiology team. She was advised again to avoid antiinflammatory medication for pain control.   On 10/25/2022 her blood pressure is under good control and I advised her to continue taking her blood pressure medication and once more I advised against the use of any antiinflammatory medication by oral route.   On 12/28/2022 blood pressure under good control, medications will remain the same.  On 03/29/2023 her blood  pressure remains under excellent control.   6/27/2023: Continues to be followed by Cardiology.   On 09/27/2023 her blood pressure is under good control.    On 12/28/2023 her blood pressure has been spiking including to a 190 systolic today in the office of, Danni Odell MD. Amlodipine was given. Her blood pressure today here is going down. The patient is eating exaggerated amount of salt and on top of that she is taking antiinflammatory nonsteroidal medications at least 6 times a week, all of this in conjunction with raised blood pressure. I told her one more time she cannot take antiinflammatory medication, she can only take Tylenol. Dr. Danni Odell, will be adjusting her blood pressure medicine accordingly.     On 01/18/2024, proper blood pressure control at this time.    On 01/31/2024, her blood pressure is in excellent range today, 124/87.    On 03/08/2024 her blood pressure is under good control. Her blood pressure medication will remain the same.       9. Insomnia  The problem is what to provide her for this problem at this time. There are cardiac issues. I do believe that this patient will be better off at least for the next 6 weeks taking a benzodiazapine or something of this nature more than any other medication. I do not feel compelled to give her gabapentin that actually has not worked for her in the past. Therefore I went ahead and prescribed for her medication in this regard today to take it at bedtime to see if this allows her to sleep properly.   On 10/25/2022 the patient persists having insomnia not sleeping more than 3 hours taking Ambien. I went ahead and discontinued this medicine. I gave her Seroquel 25 mg tablets to take 1 orally nightly. I asked her to call my nurse Priya Gonzalez next week and let us know how she is doing in that arena.   6/27/2023: Patient is no longer on Seroquel. Insomnia is mildly improved.     On 09/27/2023 the patient is no longer requiring any insomnia medication.    On  12/28/2023 not requiring any insomnia medication.     On 01/18/2024, not requiring any medicine for this at this time.    On 01/31/2024, requiring Unisom for insomnia.    On 03/08/2024 Unisom will continue to be used for insomnia.      Plan:    On 03/08/2024 the care of this patient is tremendously complicated given the multitude of issues that are arising including the recurrence of drug induced hepatitis. I discussed all of these facts with the patient and her brother who came from [place] present in the room.     She will return as posted every week for CMP, monitor liver function test and then decide in what direction the treatment for breast cancer needs to go.     Radiologically assessment of the chest is scheduled to be done in 3 weeks. Kisqali will be continued. Still minimal use of Tylenol for aches and pains.    Rheumatological referral for consideration of trigger finger injection in the left hand. It will be okay for her to discontinue her anticoagulant for a couple of days in preparation for that.        Patient remains on high risk medication and requires close monitoring for toxicity.

## 2024-03-11 ENCOUNTER — TELEPHONE (OUTPATIENT)
Dept: PSYCHIATRY | Facility: HOSPITAL | Age: 64
End: 2024-03-11
Payer: MEDICARE

## 2024-03-11 NOTE — TELEPHONE ENCOUNTER
Oncology Support Services    OSW called the patient to respond to referral by Dr. Dixon.  Patient did not answer, but OSW was able to leave VM message.  OSW is mailing information today to the patient's home about social work services offered through our office.  OSW will call patient again to attempt to assess psychosocial needs.

## 2024-03-15 ENCOUNTER — TELEPHONE (OUTPATIENT)
Dept: ONCOLOGY | Facility: CLINIC | Age: 64
End: 2024-03-15
Payer: MEDICARE

## 2024-03-15 ENCOUNTER — TELEPHONE (OUTPATIENT)
Dept: PSYCHIATRY | Facility: HOSPITAL | Age: 64
End: 2024-03-15
Payer: MEDICARE

## 2024-03-15 NOTE — TELEPHONE ENCOUNTER
Oncology Support Services    OSW called patient a second time to check in about psychosocial needs and to follow up on mailing to the home.  OSW needed to leave VM message with contact information for follow-up.

## 2024-03-15 NOTE — TELEPHONE ENCOUNTER
CALLED W/ APPTS AND TIMES  PT HAS APPT 4/10 W/DR JOSUE DID NOT SCHEDULE LABS ON 4/8 MESSAGE SENT TO RN

## 2024-03-18 ENCOUNTER — SPECIALTY PHARMACY (OUTPATIENT)
Dept: PHARMACY | Facility: HOSPITAL | Age: 64
End: 2024-03-18
Payer: MEDICARE

## 2024-03-18 ENCOUNTER — APPOINTMENT (OUTPATIENT)
Dept: ONCOLOGY | Facility: HOSPITAL | Age: 64
End: 2024-03-18
Payer: MEDICARE

## 2024-03-18 ENCOUNTER — LAB (OUTPATIENT)
Dept: LAB | Facility: HOSPITAL | Age: 64
End: 2024-03-18
Payer: MEDICARE

## 2024-03-18 DIAGNOSIS — Z17.0 MALIGNANT NEOPLASM OF OVERLAPPING SITES OF LEFT BREAST IN FEMALE, ESTROGEN RECEPTOR POSITIVE: ICD-10-CM

## 2024-03-18 DIAGNOSIS — C50.812 MALIGNANT NEOPLASM OF OVERLAPPING SITES OF LEFT BREAST IN FEMALE, ESTROGEN RECEPTOR POSITIVE: ICD-10-CM

## 2024-03-18 LAB
ALBUMIN SERPL-MCNC: 4.5 G/DL (ref 3.5–5.2)
ALBUMIN/GLOB SERPL: 1.6 G/DL
ALP SERPL-CCNC: 105 U/L (ref 39–117)
ALT SERPL W P-5'-P-CCNC: 301 U/L (ref 1–33)
ANION GAP SERPL CALCULATED.3IONS-SCNC: 10.6 MMOL/L (ref 5–15)
AST SERPL-CCNC: 118 U/L (ref 1–32)
BASOPHILS # BLD AUTO: 0.06 10*3/MM3 (ref 0–0.2)
BASOPHILS NFR BLD AUTO: 2.2 % (ref 0–1.5)
BILIRUB SERPL-MCNC: 0.4 MG/DL (ref 0–1.2)
BUN SERPL-MCNC: 16 MG/DL (ref 8–23)
BUN/CREAT SERPL: 15 (ref 7–25)
CALCIUM SPEC-SCNC: 9.8 MG/DL (ref 8.6–10.5)
CHLORIDE SERPL-SCNC: 105 MMOL/L (ref 98–107)
CO2 SERPL-SCNC: 26.4 MMOL/L (ref 22–29)
CREAT SERPL-MCNC: 1.07 MG/DL (ref 0.57–1)
DEPRECATED RDW RBC AUTO: 52.2 FL (ref 37–54)
EGFRCR SERPLBLD CKD-EPI 2021: 57.4 ML/MIN/1.73
EOSINOPHIL # BLD AUTO: 0.05 10*3/MM3 (ref 0–0.4)
EOSINOPHIL NFR BLD AUTO: 1.8 % (ref 0.3–6.2)
ERYTHROCYTE [DISTWIDTH] IN BLOOD BY AUTOMATED COUNT: 15.2 % (ref 12.3–15.4)
GLOBULIN UR ELPH-MCNC: 2.8 GM/DL
GLUCOSE SERPL-MCNC: 94 MG/DL (ref 65–99)
HCT VFR BLD AUTO: 44.3 % (ref 34–46.6)
HGB BLD-MCNC: 14.6 G/DL (ref 12–15.9)
IMM GRANULOCYTES # BLD AUTO: 0.01 10*3/MM3 (ref 0–0.05)
IMM GRANULOCYTES NFR BLD AUTO: 0.4 % (ref 0–0.5)
LYMPHOCYTES # BLD AUTO: 1.03 10*3/MM3 (ref 0.7–3.1)
LYMPHOCYTES NFR BLD AUTO: 37.7 % (ref 19.6–45.3)
MCH RBC QN AUTO: 30.9 PG (ref 26.6–33)
MCHC RBC AUTO-ENTMCNC: 33 G/DL (ref 31.5–35.7)
MCV RBC AUTO: 93.9 FL (ref 79–97)
MONOCYTES # BLD AUTO: 0.17 10*3/MM3 (ref 0.1–0.9)
MONOCYTES NFR BLD AUTO: 6.2 % (ref 5–12)
NEUTROPHILS NFR BLD AUTO: 1.41 10*3/MM3 (ref 1.7–7)
NEUTROPHILS NFR BLD AUTO: 51.7 % (ref 42.7–76)
NRBC BLD AUTO-RTO: 0 /100 WBC (ref 0–0.2)
PLATELET # BLD AUTO: 170 10*3/MM3 (ref 140–450)
PMV BLD AUTO: 8.4 FL (ref 6–12)
POTASSIUM SERPL-SCNC: 4.4 MMOL/L (ref 3.5–5.2)
PROT SERPL-MCNC: 7.3 G/DL (ref 6–8.5)
RBC # BLD AUTO: 4.72 10*6/MM3 (ref 3.77–5.28)
SODIUM SERPL-SCNC: 142 MMOL/L (ref 136–145)
WBC NRBC COR # BLD AUTO: 2.73 10*3/MM3 (ref 3.4–10.8)

## 2024-03-18 PROCEDURE — 36415 COLL VENOUS BLD VENIPUNCTURE: CPT

## 2024-03-18 PROCEDURE — 85025 COMPLETE CBC W/AUTO DIFF WBC: CPT

## 2024-03-18 PROCEDURE — 80053 COMPREHEN METABOLIC PANEL: CPT

## 2024-03-18 NOTE — PROGRESS NOTES
Staff message rec from Dr Dixon-He states pts liver tests are better-hold aromasin and keep Kisqali ongoing.    I will contact pt to check her Kisqali supply and coordinate delivery.    Elie Dixon MD sent to Ekta Gonzalez RN; Estelle Emmanuel, Pharmacy Technician  Call her liver test better hold aromasin keep KISQALI ongoing     Estelle Emmanuel, Pharmacy Technician  Specialty Pharmacy Technician

## 2024-03-19 ENCOUNTER — TELEPHONE (OUTPATIENT)
Dept: ONCOLOGY | Facility: CLINIC | Age: 64
End: 2024-03-19
Payer: MEDICARE

## 2024-03-19 ENCOUNTER — SPECIALTY PHARMACY (OUTPATIENT)
Dept: PHARMACY | Facility: HOSPITAL | Age: 64
End: 2024-03-19
Payer: MEDICARE

## 2024-03-19 NOTE — TELEPHONE ENCOUNTER
----- Message from Elie Dixon MD sent at 3/18/2024  3:58 PM EDT -----  Call her liver test better hold aromasin keep KISQALI ongoing

## 2024-03-19 NOTE — PROGRESS NOTES
Specialty Pharmacy Refill Coordination Note     Marylu is a 64 y.o. female contacted today regarding refills of Kisqali specialty medication(s).    Reviewed and verified with patient:       Specialty medication(s) and dose(s) confirmed: yes  Kisqali 200 mg tabs-2 tabs daily for 21 days on then 7 days off.    Refill Questions      Flowsheet Row Most Recent Value   Changes to allergies? No   Changes to medications? Yes  [Pt is holding aromasin]   New conditions or infections since last clinic visit No   Unplanned office visit, urgent care, ED, or hospital admission in the last 4 weeks  No   How does patient/caregiver feel medication is working? Fair   Financial problems or insurance changes  No   Since the previous refill, were any specialty medication doses or scheduled injections missed or delayed?  No   If yes, please provide the amount 0   Why were doses missed? N/A   Does this patient require a clinical escalation to a pharmacist? No            Delivery Questions      Flowsheet Row Most Recent Value   Delivery method Beeline  [Ship to [email or web address]-Ship 3/21 for delivery 3/22-$0 copay with insurance covering 100%]   Delivery address verified with patient/caregiver? Yes  [Ship to [email or web address]]   Delivery address Prescriptions   Number of medications in delivery 1   Medication(s) being filled and delivered Ribociclib Succinate   Doses left of specialty medications 0-In off week-Cycle restarts 3/26   Copay amount $0 copay with [facility]   Copay form of payment No copayment ($0)              Medication Adherence    Adherence tools used: directed education  Support network for adherence: healthcare provider       [place] delivery coordinated to the [facility] office with pt for her to  on 3/25/2024 at her appt. $0 copay with insurance covering 100%. Her last delivery was on 2/27/2024. No questions or concerns to report to MTM Team today. Her next cycle will start on 3/26/2024.     Follow-up:  21 day(s)     Estelle Emmanuel, Pharmacy Technician  Specialty Pharmacy Technician

## 2024-03-22 ENCOUNTER — SPECIALTY PHARMACY (OUTPATIENT)
Dept: PHARMACY | Facility: HOSPITAL | Age: 64
End: 2024-03-22
Payer: MEDICARE

## 2024-03-22 NOTE — PROGRESS NOTES
I have received Kisqali from [facility] and placed it in the [facility] at [facility].     Bella Thomas - Care Coordinator   3/22/2024  12:17 EDT

## 2024-03-25 ENCOUNTER — APPOINTMENT (OUTPATIENT)
Dept: ONCOLOGY | Facility: HOSPITAL | Age: 64
End: 2024-03-25
Payer: MEDICARE

## 2024-03-25 ENCOUNTER — LAB (OUTPATIENT)
Dept: LAB | Facility: HOSPITAL | Age: 64
End: 2024-03-25
Payer: MEDICARE

## 2024-03-25 ENCOUNTER — SPECIALTY PHARMACY (OUTPATIENT)
Dept: PHARMACY | Facility: HOSPITAL | Age: 64
End: 2024-03-25
Payer: MEDICARE

## 2024-03-25 DIAGNOSIS — Z17.0 MALIGNANT NEOPLASM OF OVERLAPPING SITES OF BOTH BREASTS IN FEMALE, ESTROGEN RECEPTOR POSITIVE: ICD-10-CM

## 2024-03-25 DIAGNOSIS — C50.811 MALIGNANT NEOPLASM OF OVERLAPPING SITES OF BOTH BREASTS IN FEMALE, ESTROGEN RECEPTOR POSITIVE: ICD-10-CM

## 2024-03-25 DIAGNOSIS — C50.812 MALIGNANT NEOPLASM OF OVERLAPPING SITES OF BOTH BREASTS IN FEMALE, ESTROGEN RECEPTOR POSITIVE: ICD-10-CM

## 2024-03-25 LAB
ALBUMIN SERPL-MCNC: 4.4 G/DL (ref 3.5–5.2)
ALBUMIN/GLOB SERPL: 1.5 G/DL
ALP SERPL-CCNC: 107 U/L (ref 39–117)
ALT SERPL W P-5'-P-CCNC: 329 U/L (ref 1–33)
ANION GAP SERPL CALCULATED.3IONS-SCNC: 10.3 MMOL/L (ref 5–15)
AST SERPL-CCNC: 145 U/L (ref 1–32)
BASOPHILS # BLD AUTO: 0.08 10*3/MM3 (ref 0–0.2)
BASOPHILS NFR BLD AUTO: 3.1 % (ref 0–1.5)
BILIRUB SERPL-MCNC: 0.4 MG/DL (ref 0–1.2)
BUN SERPL-MCNC: 12 MG/DL (ref 8–23)
BUN/CREAT SERPL: 12.5 (ref 7–25)
CALCIUM SPEC-SCNC: 9.7 MG/DL (ref 8.6–10.5)
CHLORIDE SERPL-SCNC: 103 MMOL/L (ref 98–107)
CO2 SERPL-SCNC: 26.7 MMOL/L (ref 22–29)
CREAT SERPL-MCNC: 0.96 MG/DL (ref 0.57–1)
DEPRECATED RDW RBC AUTO: 55.5 FL (ref 37–54)
EGFRCR SERPLBLD CKD-EPI 2021: 65.4 ML/MIN/1.73
EOSINOPHIL # BLD AUTO: 0.04 10*3/MM3 (ref 0–0.4)
EOSINOPHIL NFR BLD AUTO: 1.5 % (ref 0.3–6.2)
ERYTHROCYTE [DISTWIDTH] IN BLOOD BY AUTOMATED COUNT: 15.9 % (ref 12.3–15.4)
GLOBULIN UR ELPH-MCNC: 3 GM/DL
GLUCOSE SERPL-MCNC: 105 MG/DL (ref 65–99)
HCT VFR BLD AUTO: 42.8 % (ref 34–46.6)
HGB BLD-MCNC: 14.1 G/DL (ref 12–15.9)
IMM GRANULOCYTES # BLD AUTO: 0.01 10*3/MM3 (ref 0–0.05)
IMM GRANULOCYTES NFR BLD AUTO: 0.4 % (ref 0–0.5)
LYMPHOCYTES # BLD AUTO: 1.13 10*3/MM3 (ref 0.7–3.1)
LYMPHOCYTES NFR BLD AUTO: 43.1 % (ref 19.6–45.3)
MCH RBC QN AUTO: 31.3 PG (ref 26.6–33)
MCHC RBC AUTO-ENTMCNC: 32.9 G/DL (ref 31.5–35.7)
MCV RBC AUTO: 94.9 FL (ref 79–97)
MONOCYTES # BLD AUTO: 0.27 10*3/MM3 (ref 0.1–0.9)
MONOCYTES NFR BLD AUTO: 10.3 % (ref 5–12)
NEUTROPHILS NFR BLD AUTO: 1.09 10*3/MM3 (ref 1.7–7)
NEUTROPHILS NFR BLD AUTO: 41.6 % (ref 42.7–76)
NRBC BLD AUTO-RTO: 0 /100 WBC (ref 0–0.2)
PLATELET # BLD AUTO: 176 10*3/MM3 (ref 140–450)
PMV BLD AUTO: 8.6 FL (ref 6–12)
POTASSIUM SERPL-SCNC: 4.4 MMOL/L (ref 3.5–5.2)
PROT SERPL-MCNC: 7.4 G/DL (ref 6–8.5)
RBC # BLD AUTO: 4.51 10*6/MM3 (ref 3.77–5.28)
SODIUM SERPL-SCNC: 140 MMOL/L (ref 136–145)
WBC NRBC COR # BLD AUTO: 2.62 10*3/MM3 (ref 3.4–10.8)

## 2024-03-25 PROCEDURE — 85025 COMPLETE CBC W/AUTO DIFF WBC: CPT

## 2024-03-25 PROCEDURE — 36415 COLL VENOUS BLD VENIPUNCTURE: CPT

## 2024-03-25 PROCEDURE — 80053 COMPREHEN METABOLIC PANEL: CPT

## 2024-03-25 NOTE — PROGRESS NOTES
Kisqali supply from [facility] picked up by pt on 3/25/2024.     Estelle Emmanuel, Pharmacy Technician  Specialty Pharmacy Technician

## 2024-03-26 ENCOUNTER — TELEPHONE (OUTPATIENT)
Dept: ONCOLOGY | Facility: CLINIC | Age: 64
End: 2024-03-26
Payer: MEDICARE

## 2024-03-26 NOTE — TELEPHONE ENCOUNTER
Called patient per Dr. Dixon' request. Patient is not drinking at all. She has had one tylenol in the last week. Will relay message to Dr. Dixon. Ekta Gonzalez RN

## 2024-03-26 NOTE — TELEPHONE ENCOUNTER
----- Message from Gina Lopez [facility] sent at 3/26/2024  8:12 AM EDT -----  Regarding: LFTs  Looks like LFTs still very elevated. I wanted to check and see if you wanted to hold Kisqali??     Thanks,   Gina   Sent in steroids.   Declines

## 2024-03-26 NOTE — TELEPHONE ENCOUNTER
----- Message from Gina Lopez [facility] sent at 3/26/2024  8:12 AM EDT -----  Regarding: LFTs  Looks like LFTs still very elevated. I wanted to check and see if you wanted to hold Kisqali??     Thanks,   Gina

## 2024-04-01 ENCOUNTER — TELEPHONE (OUTPATIENT)
Dept: ONCOLOGY | Facility: CLINIC | Age: 64
End: 2024-04-01
Payer: MEDICARE

## 2024-04-01 ENCOUNTER — SPECIALTY PHARMACY (OUTPATIENT)
Dept: PHARMACY | Facility: HOSPITAL | Age: 64
End: 2024-04-01
Payer: MEDICARE

## 2024-04-01 ENCOUNTER — LAB (OUTPATIENT)
Dept: LAB | Facility: HOSPITAL | Age: 64
End: 2024-04-01
Payer: MEDICARE

## 2024-04-01 ENCOUNTER — APPOINTMENT (OUTPATIENT)
Dept: ONCOLOGY | Facility: HOSPITAL | Age: 64
End: 2024-04-01
Payer: MEDICARE

## 2024-04-01 DIAGNOSIS — M65.332 TRIGGER MIDDLE FINGER OF LEFT HAND: ICD-10-CM

## 2024-04-01 DIAGNOSIS — I89.0 LYMPHEDEMA OF UPPER EXTREMITY, BILATERAL: ICD-10-CM

## 2024-04-01 DIAGNOSIS — C50.812 MALIGNANT NEOPLASM OF OVERLAPPING SITES OF LEFT BREAST IN FEMALE, ESTROGEN RECEPTOR POSITIVE: ICD-10-CM

## 2024-04-01 DIAGNOSIS — Z17.0 MALIGNANT NEOPLASM OF OVERLAPPING SITES OF LEFT BREAST IN FEMALE, ESTROGEN RECEPTOR POSITIVE: ICD-10-CM

## 2024-04-01 DIAGNOSIS — Z79.899 ENCOUNTER FOR LONG-TERM (CURRENT) USE OF OTHER MEDICATIONS: ICD-10-CM

## 2024-04-01 LAB
ALBUMIN SERPL-MCNC: 4.8 G/DL (ref 3.5–5.2)
ALBUMIN/GLOB SERPL: 1.7 G/DL
ALP SERPL-CCNC: 117 U/L (ref 39–117)
ALT SERPL W P-5'-P-CCNC: 574 U/L (ref 1–33)
ANION GAP SERPL CALCULATED.3IONS-SCNC: 11.6 MMOL/L (ref 5–15)
AST SERPL-CCNC: 216 U/L (ref 1–32)
BASOPHILS # BLD AUTO: 0.09 10*3/MM3 (ref 0–0.2)
BASOPHILS NFR BLD AUTO: 2.7 % (ref 0–1.5)
BILIRUB SERPL-MCNC: 0.4 MG/DL (ref 0–1.2)
BUN SERPL-MCNC: 15 MG/DL (ref 8–23)
BUN/CREAT SERPL: 13.9 (ref 7–25)
CALCIUM SPEC-SCNC: 9.7 MG/DL (ref 8.6–10.5)
CHLORIDE SERPL-SCNC: 102 MMOL/L (ref 98–107)
CO2 SERPL-SCNC: 26.4 MMOL/L (ref 22–29)
CREAT SERPL-MCNC: 1.08 MG/DL (ref 0.57–1)
DEPRECATED RDW RBC AUTO: 56.4 FL (ref 37–54)
EGFRCR SERPLBLD CKD-EPI 2021: 56.8 ML/MIN/1.73
EOSINOPHIL # BLD AUTO: 0.05 10*3/MM3 (ref 0–0.4)
EOSINOPHIL NFR BLD AUTO: 1.5 % (ref 0.3–6.2)
ERYTHROCYTE [DISTWIDTH] IN BLOOD BY AUTOMATED COUNT: 16.1 % (ref 12.3–15.4)
GLOBULIN UR ELPH-MCNC: 2.8 GM/DL
GLUCOSE SERPL-MCNC: 97 MG/DL (ref 65–99)
HCT VFR BLD AUTO: 43.5 % (ref 34–46.6)
HGB BLD-MCNC: 14.2 G/DL (ref 12–15.9)
IMM GRANULOCYTES # BLD AUTO: 0 10*3/MM3 (ref 0–0.05)
IMM GRANULOCYTES NFR BLD AUTO: 0 % (ref 0–0.5)
LYMPHOCYTES # BLD AUTO: 0.95 10*3/MM3 (ref 0.7–3.1)
LYMPHOCYTES NFR BLD AUTO: 28.2 % (ref 19.6–45.3)
MCH RBC QN AUTO: 31.2 PG (ref 26.6–33)
MCHC RBC AUTO-ENTMCNC: 32.6 G/DL (ref 31.5–35.7)
MCV RBC AUTO: 95.6 FL (ref 79–97)
MONOCYTES # BLD AUTO: 0.18 10*3/MM3 (ref 0.1–0.9)
MONOCYTES NFR BLD AUTO: 5.3 % (ref 5–12)
NEUTROPHILS NFR BLD AUTO: 2.1 10*3/MM3 (ref 1.7–7)
NEUTROPHILS NFR BLD AUTO: 62.3 % (ref 42.7–76)
NRBC BLD AUTO-RTO: 0 /100 WBC (ref 0–0.2)
PLATELET # BLD AUTO: 204 10*3/MM3 (ref 140–450)
PMV BLD AUTO: 8.8 FL (ref 6–12)
POTASSIUM SERPL-SCNC: 4.2 MMOL/L (ref 3.5–5.2)
PROT SERPL-MCNC: 7.6 G/DL (ref 6–8.5)
RBC # BLD AUTO: 4.55 10*6/MM3 (ref 3.77–5.28)
SODIUM SERPL-SCNC: 140 MMOL/L (ref 136–145)
WBC NRBC COR # BLD AUTO: 3.37 10*3/MM3 (ref 3.4–10.8)

## 2024-04-01 PROCEDURE — 80053 COMPREHEN METABOLIC PANEL: CPT

## 2024-04-01 PROCEDURE — 36415 COLL VENOUS BLD VENIPUNCTURE: CPT

## 2024-04-01 PROCEDURE — 85025 COMPLETE CBC W/AUTO DIFF WBC: CPT

## 2024-04-01 RX ORDER — METOPROLOL SUCCINATE 50 MG/1
TABLET, EXTENDED RELEASE ORAL
Qty: 180 TABLET | OUTPATIENT
Start: 2024-04-01

## 2024-04-01 NOTE — PROGRESS NOTES
Staff message rec from [facility] Pharmacist-Dr Dixon has instructed for pt to hold Kisqali and he inquired on side effects.    Gina Lopez, [facility] sent to Ekta Gonzalez RN; Estelle Emmanuel, Pharmacy Technician; Emma Merchant, [facility]; Rosy River, [facility]  Yes kisqali is known to cause hepatotoxicity, elevated LFTs in 50% of patients.          Previous Messages       ----- Message -----  From: Ekta Gonzalez RN  Sent: 4/1/2024   3:25 PM EDT  To: [facility] Oral Onc Pool      ----- Message -----  From: Elie Dixon MD  Sent: 4/1/2024   3:14 PM EDT  To: Ekta Gonzalez RN    Ask her to stop KISQALI, ask pharmacist about this side effect, proceed with ct scan of the chest this week     Estelle Emmanuel, Pharmacy Technician  Specialty Pharmacy Technician

## 2024-04-01 NOTE — TELEPHONE ENCOUNTER
----- Message from Elie Dixon MD sent at 4/1/2024  3:13 PM EDT -----  Ask her to stop KISQALI, ask pharmacist about this side effect, proceed with ct scan of the chest this week

## 2024-04-01 NOTE — TELEPHONE ENCOUNTER
Called patient and relayed message. Pt v/u. Message sent to pharmacy. Schedule verified. Ekta Gonzalez RN

## 2024-04-03 ENCOUNTER — HOSPITAL ENCOUNTER (OUTPATIENT)
Dept: PET IMAGING | Facility: HOSPITAL | Age: 64
Discharge: HOME | End: 2024-04-03
Admitting: INTERNAL MEDICINE
Payer: MEDICARE

## 2024-04-03 DIAGNOSIS — C50.812 MALIGNANT NEOPLASM OF OVERLAPPING SITES OF LEFT BREAST IN FEMALE, ESTROGEN RECEPTOR POSITIVE: ICD-10-CM

## 2024-04-03 DIAGNOSIS — I89.0 LYMPHEDEMA OF UPPER EXTREMITY, BILATERAL: ICD-10-CM

## 2024-04-03 DIAGNOSIS — Z17.0 MALIGNANT NEOPLASM OF OVERLAPPING SITES OF LEFT BREAST IN FEMALE, ESTROGEN RECEPTOR POSITIVE: ICD-10-CM

## 2024-04-03 DIAGNOSIS — Z79.899 ENCOUNTER FOR LONG-TERM (CURRENT) USE OF OTHER MEDICATIONS: ICD-10-CM

## 2024-04-03 DIAGNOSIS — M65.332 TRIGGER MIDDLE FINGER OF LEFT HAND: ICD-10-CM

## 2024-04-03 PROCEDURE — 74160 CT ABDOMEN W/CONTRAST: CPT

## 2024-04-03 PROCEDURE — 71260 CT THORAX DX C+: CPT

## 2024-04-03 PROCEDURE — 25510000001 IOPAMIDOL 61 % SOLUTION: Performed by: INTERNAL MEDICINE

## 2024-04-03 RX ADMIN — IOPAMIDOL 85 ML: 612 INJECTION, SOLUTION INTRAVENOUS at 08:35

## 2024-04-10 ENCOUNTER — LAB (OUTPATIENT)
Dept: LAB | Facility: HOSPITAL | Age: 64
End: 2024-04-10
Payer: MEDICARE

## 2024-04-10 ENCOUNTER — OFFICE VISIT (OUTPATIENT)
Dept: ONCOLOGY | Facility: CLINIC | Age: 64
End: 2024-04-10
Payer: MEDICARE

## 2024-04-10 VITALS
DIASTOLIC BLOOD PRESSURE: 83 MMHG | BODY MASS INDEX: 29.77 KG/M2 | HEIGHT: 65 IN | RESPIRATION RATE: 16 BRPM | WEIGHT: 178.7 LBS | HEART RATE: 74 BPM | TEMPERATURE: 98.4 F | SYSTOLIC BLOOD PRESSURE: 121 MMHG

## 2024-04-10 DIAGNOSIS — K75.9 HEPATITIS: Primary | ICD-10-CM

## 2024-04-10 DIAGNOSIS — K75.9 HEPATITIS: ICD-10-CM

## 2024-04-10 LAB
ALBUMIN SERPL-MCNC: 4.6 G/DL (ref 3.5–5.2)
ALBUMIN/GLOB SERPL: 1.5 G/DL
ALP SERPL-CCNC: 115 U/L (ref 39–117)
ALT SERPL W P-5'-P-CCNC: 416 U/L (ref 1–33)
ANION GAP SERPL CALCULATED.3IONS-SCNC: 10.4 MMOL/L (ref 5–15)
APAP SERPL-MCNC: <5 MCG/ML (ref 0–30)
AST SERPL-CCNC: 166 U/L (ref 1–32)
BASOPHILS # BLD AUTO: 0.07 10*3/MM3 (ref 0–0.2)
BASOPHILS NFR BLD AUTO: 2.2 % (ref 0–1.5)
BILIRUB SERPL-MCNC: 0.6 MG/DL (ref 0–1.2)
BUN SERPL-MCNC: 13 MG/DL (ref 8–23)
BUN/CREAT SERPL: 14.9 (ref 7–25)
CALCIUM SPEC-SCNC: 10 MG/DL (ref 8.6–10.5)
CHLORIDE SERPL-SCNC: 103 MMOL/L (ref 98–107)
CO2 SERPL-SCNC: 26.6 MMOL/L (ref 22–29)
CREAT SERPL-MCNC: 0.87 MG/DL (ref 0.57–1)
DEPRECATED RDW RBC AUTO: 59.9 FL (ref 37–54)
EGFRCR SERPLBLD CKD-EPI 2021: 73.6 ML/MIN/1.73
EOSINOPHIL # BLD AUTO: 0.06 10*3/MM3 (ref 0–0.4)
EOSINOPHIL NFR BLD AUTO: 1.8 % (ref 0.3–6.2)
ERYTHROCYTE [DISTWIDTH] IN BLOOD BY AUTOMATED COUNT: 16.7 % (ref 12.3–15.4)
GLOBULIN UR ELPH-MCNC: 3.1 GM/DL
GLUCOSE SERPL-MCNC: 109 MG/DL (ref 65–99)
HAV IGM SERPL QL IA: NORMAL
HBV CORE IGM SERPL QL IA: NORMAL
HBV SURFACE AG SERPL QL IA: NORMAL
HCT VFR BLD AUTO: 43.2 % (ref 34–46.6)
HCV AB SER DONR QL: NORMAL
HGB BLD-MCNC: 14.2 G/DL (ref 12–15.9)
IMM GRANULOCYTES # BLD AUTO: 0.01 10*3/MM3 (ref 0–0.05)
IMM GRANULOCYTES NFR BLD AUTO: 0.3 % (ref 0–0.5)
LYMPHOCYTES # BLD AUTO: 1.02 10*3/MM3 (ref 0.7–3.1)
LYMPHOCYTES NFR BLD AUTO: 31.4 % (ref 19.6–45.3)
MCH RBC QN AUTO: 31.7 PG (ref 26.6–33)
MCHC RBC AUTO-ENTMCNC: 32.9 G/DL (ref 31.5–35.7)
MCV RBC AUTO: 96.4 FL (ref 79–97)
MONOCYTES # BLD AUTO: 0.3 10*3/MM3 (ref 0.1–0.9)
MONOCYTES NFR BLD AUTO: 9.2 % (ref 5–12)
NEUTROPHILS NFR BLD AUTO: 1.79 10*3/MM3 (ref 1.7–7)
NEUTROPHILS NFR BLD AUTO: 55.1 % (ref 42.7–76)
NRBC BLD AUTO-RTO: 0 /100 WBC (ref 0–0.2)
PLATELET # BLD AUTO: 172 10*3/MM3 (ref 140–450)
PMV BLD AUTO: 8.7 FL (ref 6–12)
POTASSIUM SERPL-SCNC: 4.6 MMOL/L (ref 3.5–5.2)
PROT SERPL-MCNC: 7.7 G/DL (ref 6–8.5)
RBC # BLD AUTO: 4.48 10*6/MM3 (ref 3.77–5.28)
SODIUM SERPL-SCNC: 140 MMOL/L (ref 136–145)
WBC NRBC COR # BLD AUTO: 3.25 10*3/MM3 (ref 3.4–10.8)

## 2024-04-10 PROCEDURE — 80074 ACUTE HEPATITIS PANEL: CPT | Performed by: INTERNAL MEDICINE

## 2024-04-10 PROCEDURE — 85025 COMPLETE CBC W/AUTO DIFF WBC: CPT | Performed by: INTERNAL MEDICINE

## 2024-04-10 PROCEDURE — 80143 DRUG ASSAY ACETAMINOPHEN: CPT | Performed by: INTERNAL MEDICINE

## 2024-04-10 PROCEDURE — 36415 COLL VENOUS BLD VENIPUNCTURE: CPT

## 2024-04-10 PROCEDURE — 80053 COMPREHEN METABOLIC PANEL: CPT | Performed by: INTERNAL MEDICINE

## 2024-04-10 NOTE — PROGRESS NOTES
Subjective     REASON FOR FOLLOW UP:  1. 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 R BREAST MASS WITH GIANT R AXILLARY ADENOPATHY.  SHE UNDERWENT DOSE DENSE AC, TAXOL, BILATERAL MASTECTOMIES, DRAMATIC NEAR COMPLETE NM, RADIATION THERAPY AND ADJUVANT FEMARA STOPPED ON 12/21 BECAUSE DRUG INDUCED HEPATITIS, SWITCHED TO AROMASIN 2/22: NO SIDE EFFECTS.    3. FAMILY HISTORY OF BREAST CANCER IN MOTHER AND SISTER, SISTER WAS TESTED FOR BRCA AND WAS NEGATIVE.    4. LEFT OVARIAN CYST , IT HAS BEEN PRESENT FOR LONG TIME BUT IS GETTING LARGER, ASYMPTOMATIC, NEED FOR , PELVIC US AND GYN ONC EVal: no need for any intervention                  5. LEFT HIP PAIN SEVERE ARTHRITIS, REAL NEED FOR LEFT HIP REPLACEMENT: PERFORMED 8/21    6. DRUG INDUCED HEPATITIS,  FINALLY STOPPED FEMARA: DRAMATIC IMPROVEMENT AND RESOLUTION.      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 She is not receiving any treatment for her metastatic breast cancer at this point. She has no symptoms pertinent to the location of her breast cancer metastasis behind the sternum and in front of the pericardium. She denies any pain, cough, sputum production, or shortness of breath. She remains on atrial fibrillation that has nothing to do with the pericardium or epicardial metastasis. The patient denies any fever, chills or infection. She is not drinking any alcohol or taking any other medications or supplements that could be triggering abnormal liver function test. The patient takes Tylenol for joint pain but she says that the use of the medicine is sporadic and controlled amount.     She is not jaundiced.            Past Medical History:   Diagnosis Date    Anemia in neoplastic disease     Arthritis     Arthritis of back     Arthritis of neck     Breast cancer     Left    CVA (cerebral  vascular accident)     Encounter for long-term (current) use of other medications 06/22/2021    Fracture, fibula     Fracture, finger     Frozen shoulder     Hip arthrosis 01/17    Hip pain     RIGHT HIP... CYST    History of fracture of leg 1987    History of radiation therapy     LAST TREATMENT      Hypertension     Knee swelling     Limited joint range of motion (ROM)     RIGHT HIP    Low back strain     Periarthritis of shoulder     Rotator cuff syndrome 6/22    Skin sore     OPEN SORE LEFT BREAST    Syncope     Vertigo            Past Surgical History:   Procedure Laterality Date    AXILLARY LYMPH NODE BIOPSY/EXCISION Right     LYMPH NODE UNDER RIGHT ARM-MALIGNANT (DOUBLE MASTECTOMY)    BREAST BIOPSY Left     MALIGNANT    FRACTURE SURGERY  1987    Leg    HARDWARE REMOVAL      INCISION AND DRAINAGE LEG Left 06/30/2022    Procedure: INCISION AND DRAINAGE left ankle;  Surgeon: Kenneth Tran MD;  Location: [facility];  Service: Orthopedics;  Laterality: Left;    JOINT REPLACEMENT Bilateral mar 2020,july 2021    hips    MASTECTOMY W/ SENTINEL NODE BIOPSY Bilateral 09/16/2019    Procedure: BILATERLA MODIFIED RADICAL MASTECTOMY WITH BILATERAL SENTINEL LYMPH NODE BIOPSY;  Surgeon: Joby Barron Jr., MD;  Location: [facility] OR;  Service: General    TOTAL HIP ARTHROPLASTY Right 03/02/2020    Procedure: RIGHT TOTAL HIP ARTHROPLASTY NATALY NAVIGATION;  Surgeon: Luis M Leonard MD;  Location: [facility] OR;  Service: Orthopedics;  Laterality: Right;    TOTAL HIP ARTHROPLASTY Left 07/20/2021    Procedure: Posterior LEFT TOTAL HIP ARTHROPLASTY NATALY NAVIGATION;  Surgeon: Luis M Leonard MD;  Location: [facility] OR;  Service: Orthopedics;  Laterality: Left;    VENOUS ACCESS DEVICE (PORT) INSERTION Right 02/01/2019    Procedure: INSERTION VENOUS ACCESS DEVICE;  Surgeon: Joby Barron Jr., MD;  Location: [facility];  Service: General    VENOUS ACCESS DEVICE (PORT) REMOVAL N/A 10/30/2019     Procedure: Mediport Removal;  Surgeon: Joby Barron Jr., MD;  Location: [facility] OR;  Service: General        Current Outpatient Medications on File Prior to Visit   Medication Sig Dispense Refill    amLODIPine (NORVASC) 2.5 MG tablet Take 1 tablet by mouth Daily. 30 tablet 11    apixaban (ELIQUIS) 5 MG tablet tablet Take 1 tablet by mouth Every 12 (Twelve) Hours. Indications: Atrial Fibrillation 60 tablet 5    doxylamine (UNISOM) 25 MG tablet Take 1 tablet by mouth At Night As Needed for Sleep.      metoprolol succinate XL (TOPROL-XL) 50 MG 24 hr tablet Take 2 tablets po q am and one tablet po q pm 270 tablet 3    polyethylene glycol 17 g packet Take 17 g by mouth 2 (Two) Times a Day As Needed (constipation).      tiZANidine (ZANAFLEX) 4 MG tablet Take 1 tablet by mouth Every 6 (Six) Hours As Needed for Muscle Spasms.      [DISCONTINUED] acetaminophen (TYLENOL) 325 MG tablet Take 2 tablets by mouth Every 6 (Six) Hours As Needed.      [DISCONTINUED] digoxin (LANOXIN) 125 MCG tablet Take 1 tablet by mouth Daily.      [DISCONTINUED] ribociclib succinate 200 MG tablet therapy pack tablet Take 2 tablets by mouth Daily. Take for 21 days on then 7 days off 42 tablet 1     Current Facility-Administered Medications on File Prior to Visit   Medication Dose Route Frequency Provider Last Rate Last Admin    Chlorhexidine Gluconate Cloth 2 % pads 1 each  1 each Apply externally Take As Directed Luis M Leonard MD            ALLERGIES:    Allergies   Allergen Reactions    Benadryl [Diphenhydramine] Itching     INCREASES BLOOD PRESSURE    Erythromycin GI Intolerance    Levaquin [Levofloxacin] GI Intolerance    Penicillins GI Intolerance        Social History     Socioeconomic History    Marital status:      Spouse name: Cruz    Number of children: 0   Tobacco Use    Smoking status: Never    Smokeless tobacco: Never   Vaping Use    Vaping status: Never Used   Substance and Sexual Activity    Alcohol use: Yes      "Comment: occasional    Drug use: No    Sexual activity: Not Currently     Partners: Male     Birth control/protection: Abstinence        Family History   Problem Relation Age of Onset    Lung cancer Father     Irritable bowel syndrome Father     Cancer Mother     Breast cancer Mother     Liver disease Mother     Breast cancer Sister 48    Breast cancer Maternal Grandmother     Breast cancer Paternal Grandmother     Breast cancer Maternal Uncle     Malig Hyperthermia Neg Hx             Objective     Vitals:    04/10/24 0843   BP: 121/83   Pulse: 74   Resp: 16   Temp: 98.4 °F (36.9 °C)   TempSrc: Temporal   Weight: 81.1 kg (178 lb 11.2 oz)   Height: 165.1 cm (65\")   PainSc: 0-No pain             4/10/2024     8:42 AM   Current Status   ECOG score 0     Exam:           GENERAL:  Well-developed, Patient  in no acute distress.   SKIN:  Warm, dry ,NO purpura ,no rash.  HEENT:  Pupils were equal and reactive to light and accomodation, conjunctivae noninjected,  normal visual acuity.   NECK:  Supple with good range of motion; no thyromegaly , no JVD or bruits,.No carotid artery pain, no carotid abnormal pulsation   LYMPHATICS:  No cervical, NO supraclavicular, NO axillary, NO inguinal adenopathies.  CARDIAC   normal rate , regular rhythm, without murmur,NO rubs NO S3 NO S4   LUNGS: normal breath sounds bilateral, no wheezing, NO rhonchi, NO crackles ,NO rubs.  VASCULAR VENOUS: no cyanosis, NO collateral circulation, NO varicosities, NO edema, NO palpable cords, NO pain,NO erythema, NO pigmentation of the skin.  ABDOMEN:  Soft, NO pain,no hepatomegaly, no splenomegaly,no masses, no ascites, no collateral circulation,no distention.  EXTREMITIES  AND SPINE:  No clubbing, no cyanosis ,no deformities , no pain .No kyphosis,  no pain in spine, no pain in ribs , no pain in pelvic bone.  NEUROLOGICAL:  Patient was awake, alert, oriented to time, person and place.      RECENT LABS:     Hematology     WBC   Date Value Ref Range " Status   04/10/2024 3.25 (L) 3.40 - 10.80 10*3/mm3 Final     RBC   Date Value Ref Range Status   04/10/2024 4.48 3.77 - 5.28 10*6/mm3 Final     Hemoglobin   Date Value Ref Range Status   04/10/2024 14.2 12.0 - 15.9 g/dL Final     Hematocrit   Date Value Ref Range Status   04/10/2024 43.2 34.0 - 46.6 % Final     MCV   Date Value Ref Range Status   04/10/2024 96.4 79.0 - 97.0 fL Final     MCH   Date Value Ref Range Status   04/10/2024 31.7 26.6 - 33.0 pg Final     MCHC   Date Value Ref Range Status   04/10/2024 32.9 31.5 - 35.7 g/dL Final     RDW   Date Value Ref Range Status   04/10/2024 16.7 (H) 12.3 - 15.4 % Final     RDW-SD   Date Value Ref Range Status   04/10/2024 59.9 (H) 37.0 - 54.0 fl Final     MPV   Date Value Ref Range Status   04/10/2024 8.7 6.0 - 12.0 fL Final     Platelets   Date Value Ref Range Status   04/10/2024 172 140 - 450 10*3/mm3 Final     Neutrophil %   Date Value Ref Range Status   04/10/2024 55.1 42.7 - 76.0 % Final     Lymphocyte %   Date Value Ref Range Status   04/10/2024 31.4 19.6 - 45.3 % Final     Monocyte %   Date Value Ref Range Status   04/10/2024 9.2 5.0 - 12.0 % Final     Eosinophil %   Date Value Ref Range Status   04/10/2024 1.8 0.3 - 6.2 % Final     Basophil %   Date Value Ref Range Status   04/10/2024 2.2 (H) 0.0 - 1.5 % Final     Immature Grans %   Date Value Ref Range Status   04/10/2024 0.3 0.0 - 0.5 % Final     Neutrophils, Absolute   Date Value Ref Range Status   04/10/2024 1.79 1.70 - 7.00 10*3/mm3 Final     Lymphocytes, Absolute   Date Value Ref Range Status   04/10/2024 1.02 0.70 - 3.10 10*3/mm3 Final     Monocytes, Absolute   Date Value Ref Range Status   04/10/2024 0.30 0.10 - 0.90 10*3/mm3 Final     Eosinophils, Absolute   Date Value Ref Range Status   04/10/2024 0.06 0.00 - 0.40 10*3/mm3 Final     Basophils, Absolute   Date Value Ref Range Status   04/10/2024 0.07 0.00 - 0.20 10*3/mm3 Final     Immature Grans, Absolute   Date Value Ref Range Status   04/10/2024 0.01  0.00 - 0.05 10*3/mm3 Final     nRBC   Date Value Ref Range Status   04/10/2024 0.0 0.0 - 0.2 /100 WBC Final          CT Chest With Contrast Diagnostic (04/03/2024 08:50)  CT Abdomen With Contrast (04/03/2024 08:50)                            Lab Results   Component Value Date    GLUCOSE 109 (H) 04/10/2024    BUN 13 04/10/2024    CREATININE 0.87 04/10/2024    EGFR 73.6 04/10/2024    BCR 14.9 04/10/2024    K 4.6 04/10/2024    CO2 26.6 04/10/2024    CALCIUM 10.0 04/10/2024    ALBUMIN 4.6 04/10/2024    BILITOT 0.6 04/10/2024     (C) 04/10/2024     (C) 04/10/2024        Assessment & Plan    1.  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   For at least 4 years, she has had an in crescendo mass in the left breast that has produced a gigantic tumor that was close to 25 cm in size and is replacing most of the anatomy of the left breast. There are areas of ulceration necrosis and bleeding and the mass is fixed to the chest wall. She has abundant amount of collateral circulation in the left anterior chest wall and it caught my attention the lack of any left axillary adenopathy. She has no lymphedema in the left upper extremity. In the right breast while in sitting position, no palpable abnormalities are documented. The right breast skin and nipple are normal. When the patient lies flat, there is a palpable mass at 9 o'clock from the nipple that measures probably 4 cm in size, very indistinct in regard to edges and margins. There was no mobility and no areas of tenderness. She has a giant adenopathy in the right axilla that measures close to 9 cm in size, uniform, no fluctuation, nontender, completely fixed to the axillary wall. There is no compromise of the skin.   Patient completed 4 cycles of neoadjuvant AC on 4/12/2019.    Patient completed neoadjuvant weekly Taxol x 12 treatments on 7/19/2019.  9/16/2019  bilateral mastectomy by Dr. Joby Barron with axillary lymph node dissection.  No residual malignancy.  10/30/2019 patient's Mediport was removed in anticipation of radiation.  Radiation therapy under the care of Dr. Marmolejo 10/31/2019-1/13/2020 to the right chest wall.  Started on adjuvant Femara 2.5 mg daily 8/20/2019.  9/21/2020 continues on adjuvant Femara, tolerating it quite well.  Some mild hot flashes and mild arthralgias, all which are tolerable.  The patient returned to the office on 05/24/2021. Since the previous visit the patient proceeded with a PET scan and I have reviewed this with her in the PAC system showing chest wall on the left side area of enlightening SUV activity that is almost 3 cm long x 7 mm wide. It is behind the bone. It is very close to the lower aspect of her heart. The reset of the PET scan discloses no activity. This is also specific in regard to the ovaries and actually an ultrasound of her vagina looking into the ovaries documented an unilocular cyst on the left side with typical features of benignity and a  was completely normal 5.5.   The patient was further reviewed on 07/07/2021 after she has continued her Kisqali and completion of the 1st round of the medicine. Today her EKG disclosed a normal QT interval and this has been sent to Cardiology to be reported officially. 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 She has completed her radiation therapy to the epicardial right lymph node with no difficulties or side effects.   The patient was further reviewed on 09/30/2021. Patient has recovered well from her left hip replacement. She completed her Kisqali a week ago. Her white count is low today. The hemoglobin is stable. The platelet count is stable. Elevation in her liver function test, SGOT, SGPT noted. The patient is not drinking any alcohol. She is not taking any cholesterol medicine. She is not taking any anti-inflammatory medication  and leads me to think that Kisqali is the culprit of this. Given these findings we advised the patient to put the Kisqali on hold until we recheck chemistry profile on weekly basis for the next 3 or 4 weeks. We need to settle these numbers down before she continues the Kisqali. She probably will require dose adjustment at some point. Again, her hepatitis A, B and C serologies are negative.   n regard to her history of breast cancer she was reviewed on 11/19/2021. Since the previous visit the patient has had tumor marker CA 15-3 that has dropped to 20. Radiological assessment of her chest and abdomen CT scan that documents resolution of the epicardial lymphadenopathy in the right hemithorax, no pulmonary nodules or metastasis, no pleural effusion and no liver metastasis. No bone metastasis. Based on this information I do believe that the breast cancer is relatively quiet clinically, biochemically and radiologically and I advised her today to resume her Femara at the dose of 2.5 mg a day. The likelihood of Femara producing liver dysfunction is more than remote.   The patient was further reviewed on 12/29/2021. Recent review of her liver function tests a few days ago documented persistent elevation of her SGOT, SGPT, and alkaline phosphatase. She has discontinued most of the medicines and I have no other choice but discontinue the Femara. Since then and actually today is the 1st day in many weeks that the SGOT and SGPT and alkaline phosphatase are improving. The patient actually remains asymptomatic in this regard. Her liver is not enlarged. She has no jaundice. She has no rash, no skin lesions and no other new alterations. That is good news. Her white blood count, hemoglobin and platelets remain stable. 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 That is extremely rare. As I  posted above, I discussed with all my partners in regard to how many times through their careers prescribing Femara to multiple breast cancers they have seen Femara triggering hepatitis, none of them gave me a positive answer. I reviewed this information in Up[employer] and it is reported in less than 1% of patients taking this medication. I think we are in front of a rare situation but I want for her to withhold any further Femara treatment for the time being and I want to review her back in 3 weeks. If the liver function tests continue improving or normalize by then maybe we will have the answer once and for all. Raises the question what we will do next and I think the next medicine will be the utilization of Aromasin that has a different chemical component and different methodology for action. I made her aware of that. We are not going to go back in giving her Kisqali under the present circumstances given the confusion and all the issues pertinent to her liver dysfunction.   The patient was further reviewed on 03/07/2022. Since the previous visit the patient has stopped the Femara in 12/2021 and today her liver function tests are completely back to normality. This proves the point that Femara was the culprit phenomenon that I never have seen and discussed with my partner, Danelle Cespedes MD. He never has seen this neither.   The patient returned on 05/09/2022 with no symptoms of anything in particular besides minimal respiratory allergies and pain in the left hip associated with her previous surgery. Her clinical examination today is very much unremarkable, she looks fantastic. She has no symptoms or signs of breast cancer recurrence. White count, hemoglobin and platelets are normal. Her tumor marker CA 15-3 has remained normal and actually lower than ever and compliance with Aromasin has been 100% with excellent tolerance.   Given the circumstances of this I advised her to remain on Aromasin 25 mg a day and I find  no use for this patient to go back onto Kisqali or medicines of this nature.   On 08/16/2022 we reviewed the patient in regard to her history of breast cancer. Clinically she has not had any obvious recurrent disease in the chest wall in the lung anatomy or abdomen or pelvis. She remains on Aromasin adjuvantly and we are waiting to review tumor marker. She has a chest wall that is completely flat due to previous mastectomies and radiation therapy. There is no axillary adenopathies and the patient has in my opinion control of her disease process as far as we can tell. I advised her to remain on Aromasin 25 mg a day. I went ahead and scheduled a visit with us in a few weeks in order to further review radiological analysis that will be discussed below.  On 09/14/2022 the patient has had in the interim a CT scan of the chest, abdomen and pelvis for review personally. Her pulmonary anatomy remains normal, there is no pulmonary congestion, pleural effusions, pericardial effusion, cardiomegaly, hilar or mediastinal adenopathy. There is prepericardiac lymph node measuring almost 1.5 to 2 cm in size that is flat like a small finger that has been present before and has minimally enlarged. If this has anything to do with her previous history of breast cancer is difficult to state but this has been evaluated before with similarity in regards size and some degree of fluctuation. I think this does not constrain me from thinking with a normal tumor marker of CA 15-3 of 20 during the previous visit or compel to perform either removal or biopsy at this time or proceed with radiological assessment with a PET scan. I do believe that this is not representation of anything in particular. Her liver anatomy and the rest of the bony anatomy, pancreas, kidneys, spleen and retroperitoneum remain unchanged. Given this finding I advised the patient to proceed and continue her Aromasin without the need to add any other medication to her regimen  of medication especially in the background of A-fib. I advised her to proceed with reassessment with me in 6 weeks with a repeat CBC, CMP and CA 15-3. In that moment also we will obtain a liquid biopsy for further analysis. Her liver function test has remained normal and Aromasin has not produced any chemical hepatitis. I advised her again to remain on this medicine by itself.   On 10/25/2022 the patient remains on Aromasin. She has no symptoms or signs that would indicate breast cancer recurrence and the latest tumor marker CA 15-3 was 20 two months ago. We have another one pending today. Given these circumstances I advised her to remain on Aromasin for the time being. I find no need for radiological assessment at this time. She will be called at home with the report of her tumor marker.   On 12/28/2022 the patient had no symptoms or signs of breast cancer progression or recurrence on any level. Tolerance to Aromasin is excellent with no side effects and she has 100% compliance on the medication. She was advised to remain on the medication. Her tumor marker CA 15-3 has remained normal. No plans for radiological assessment unless new clinical changes occur or tumor marker changes.   On 03/29/2023 the patient has no symptoms or signs of breast cancer recurrence. She continues taking her aromatase inhibitor medicine on a routine basis with 100% compliance and no significant side effects from the medication. Clinically she has no evidence of breast cancer recurrence and during the previous visit her CA 15-3 remained normal, another one is pending today that will be discussed with her on the telephone once it becomes available. Given the stability of her clinical picture and the excellent clinical status I advised the patient to remain on her medicine for the time being returning to see us back every 3 months with a CBC, CMP and CA 15-3. I find no need for radiological assessment on her at this point.   6/27/2023:  Patient reviewed back today in office. She does not have any clinical findings suggestive of malignancy reoccurrence. She remains on Aromasin daily. Labs reviewed. CBC and CMP are both normal. CA 15-3 is normal at 18.5. Patient encouraged to become more active to help with weight gain and arthritis.   On 09/27/2023 the patient was further reviewed. She has no symptoms or signs of breast cancer recurrence. Her chest wall is unremarkable, the lymphedema in the left upper extremity is a grade 1 at the most controlled and she has no need for any elastic support. She has not developed any cellulitis or infection in the left upper extremity. The patient is taking Aromasin adjuvantly 100% compliance, no evidence of recurrent disease. Normal white count, hemoglobin and platelets pending chemistry profile. We advised her to remain on Aromasin 25 mg a day. She will be returning to see us back in 3 months with repeat CBC, CMP and CA 15-3. No need for radiological assessment on her at this point.    On 12/28/2023 no symptoms or signs of breast cancer recurrence. My conversation with, Danni Odell MD, today took place in regard to the potentiality for pleuritic pain on the right side. Lung auscultation is normal and the patient has no symptoms. This pain comes and goes very sporadically and is not bothering her on routine basis. Knowing that she has had a minimal pericardial alteration before we opted to proceed with a CT angiogram of the chest that will be done tomorrow and we will review this with the patient at some point on the telephone in the next 48 hours or so.     If we assume that her cancer marker CA 15-3 remains stable the patient will remain on Aromasin and we will review her radiological analysis again. Otherwise plan to review her back in 3 months with repeat CBC, CMP and CA 15-3.     On 01/18/2024, the patient was brought to the office to discuss the findings of the pathological report of her retrosternal mass that  documents carcinoma of the breast, metastatic, ER positive, SC positive, HER2 negative. Further analysis of this by Clara has been requested.     On this basis, I do believe that the patient has now officially metastatic breast cancer to different sites, specifically inside of the chest. I do not see any options in regard to surgical resection of this, neither radiation therapy because the heart is behind the tumoral site. Radiation therapy will be highly damaging to her heart, especially receiving anthracycline in the past. She had received Kisqali in the past with tremendous benefit and we will proceed with the management of this medicine at the dose of 200 mg twice a day. In anticipation of the use of this, the patient will have a magnesium supplement every day and some cashews every day and she will proceed with an EKG today to measure QT interval. This will be checked back in a couple of weeks.     The patient will require laboratory assessment every 2 weeks to monitor white count, hemoglobin and platelets in regard to Kisqali utilization.     I went ahead and requested a PET scan to be done as early as next week. The patient will have formal education and consent for Kisqali and the medicine will be delivered to her house to initiate this at some point probably in the middle of next week.     The patient will have the medication utilization at least every 6-8 weeks, repeat radiological assessment then and then decide how to proceed. We now know that her tumor marker is not useful to follow up on her disease process.    On 01/31/2024, the patient has not had any side effects of Kisqali administration along with Aromasin. Kisqali will continue at the present dose and the present schedule. We will continue rechecking her every couple of weeks and I will review her back in 4 weeks. Today her EKG shows atrial fibrillation with controlled ventricular response and a QT interval that remains stable, unchanged from  previous EKG. Formal request in regard to EKG interpretation has been done.    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 She has remained on her Aromasin and Kisqali and she is in the 2nd week of the 2nd batch of Kisqali at this time. She has not encountered any nausea or vomiting. She has not had any cardiac irregularity. She is constipated.     Her clinical examination is very benign. Her white count is low expected from the Kisqali with a normal hemoglobin and a normal platelet count. Her chemistry profile is profoundly abnormal, raises the question if now she has hepatitis induced by the Aromasin. 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 I went ahead and discontinued the Aromasin at this time and allowed her to continue the Kisqali until completion and she will require reassessment with laboratory testing on weekly basis from now on until we see what tendency her liver function test is going to go. If the numbers improve discontinuing the Aromasin the next step will be to deliver her therapy with Faslodex once a month, obviously with typical loading dose first. If the liver function test does not improve we will need to discontinue most of her medicines and reassess the status and figure out medicine by medicine one by one which is the one that is producing the hepatitis. Again the patient is not drinking any alcohol.    I went ahead and scheduled a CT scan of the chest to be done in 3 weeks from now and review with her in 4 weeks repeating CBC and CMP at that time. Her CA 15-3 has become useless in the follow up of her disease process. In the Next Generation Sequencing of the tissue documented from the chest biopsy unfortunately there is no other actionable mutation.     On 04/10/2024, the patient has been discontinued on Aromasin several weeks ago along with Kisqali 2 weeks ago given the presence of persistency of hepatitis induced by medication. She is not  drinking alcohol. She is not taking ibuprofen. She is not taking cholesterol medicine and she is not taking excessive amount of Tylenol. Today, the patient has no jaundice. The liver is not palpable. She has no tenderness in the right upper quadrant and the patient has had some mild improvement in her liver function test as posted above. This is good news. The liver functions were going in crescendo. Now they are going in decrescendo and that is again good news. I went ahead and repeated hepatitis serologies A, B and C. They remain negative. I requested antimitochondrial antibodies given the fact that the patient's mother has primary biliary cirrhosis. I also requested an AARON profile. In the meantime, obviously the real question is how to treat her metastatic breast cancer. I think we need to let her liver cool off before we move into any other intervention. I am planning to present her case in the lung cancer conference to see if any ideas in regard to removal of this tissue is available and also in the breast cancer conference in regard to any other ideas in regard to the treatment of her metastatic breast cancer with chemotherapy administration.     She will be informed about these conversations when they take place.      2. Left hip pain.   05/17/2021: Patient reports new complaint of progressive discomfort and the inability to function given the pain in the left hip area. Now she is limping. The only time when she is not in discomfort with the left hip is when she is sitting or lying in bed or riding the horse when gravity takes away the pressure of her left hip area. Radiologically speaking the CT scan of the chest, abdomen and pelvis to my eyes disclosed no abnormalities besides a very simple ovarian cyst that measures close to 7 x 5 cm with no trabeculation. There is no pelvic ascites. There is no pelvic adenopathy. The other abnormality obviously visible is the severe degree of scoliosis of the thoracic,  lumbar spines.   It caught my attention the subchondral cyst in the left hip and the minimal if any space leftover between the head of the femur and the acetabulum. There is no metastasis in this anatomical site.   The radiologist mentioned cardiophrenic angle lymphadenopathy. I cannot see that from my naked eyes. I do not know what it is and I do not know how to express it. Pulmonary anatomy is normal. Hilar adenopathy is normal. No pericardial effusion. No pleural effusion. Minimal infiltrate in the lungs related to radiation changes. Liver anatomy, pancreas and kidneys were unremarkable. The scoliosis is very obvious in the view of the abdomen.   Patient refereed with Dr. Leonard, orthopedic surgeon.   Patient underwent left hip replacement and recovered well.   On 09/27/2023 her left hip pain is very minimal at this time, she is not limping. 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 Her osteoarthritis in her hands is still a prevalent issue with aches and pains periodically. No need for pain medicine at this time besides Tylenol.     On 12/28/2023 she has arthritic pain in multiple sites in her back, in her hips, in her knees, in her hands. She takes now as per my advice Tylenol and I told her absolutely no antiinflammatory medications whatsoever.     On 01/18/2024, no issues pertinent.    On 01/31/2024, no bone pain at this time .    On 03/08/2024 pain intermittently in the hip depending on the time of the day and activity at home. Requiring Tylenol occasionally. She is not taking any antiinflammatory medication and she is not taking any excessive amount of Tylenol at all.     On 04/10/2024 requiring very occasional Tylenol. We are going to obtain a Tylenol level today to be sure that she is not doing too much of this medicine that is triggering liver disease.      3. Ovarian cyst  05/17/2021: Her CA 15-3 was minimal elevated in  comparison to previous assessment and pelvic ultrasound ordered. t raises the following question.   05/24/2021: This is also specific in regard to the ovaries and actually an ultrasound of her vagina looking into the ovaries documented an unilocular cyst on the left side with typical features of benignity and a  was completely normal 5.5.   6/27/2023: CA 15-3 is normal at 18.5.  On 09/27/2023 no issues pertinent to this. This will remain in observation.    On 12/28/2023 this is asymptomatic. No issues pertinent to this at this time.     On 01/18/2024, no issues pertinent.    On 01/31/2024, her ovarian cyst has not changed radiologically in size. This will be left alone for the time being.    On 03/08/2024 ovarian cyst is not an issue at this time.    On 04/10/2024, the ovarian cyst is asymptomatic. We will leave this alone.      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   Her liver enzymes are completely normal today. She remains on Aromasin and obviously this patient never will be back on letrozole under any circumstances. This was documented through liver biopsy and through removal of the medication that triggered quick improvement in her liver function test.   On 09/14/2022 there is no evidence of drug induced hepatitis. Aromasin will be continued. The patient is aware that she never will be able to take Femara.   On 10/25/2022 her liver enzymes are completely normal today. Since discontinuation of Femara her drug induced hepatitis has resolved. This situation was extremely rare.   ON 12/28/2022 no issues pertinent to liver function after discontinuation of Femara months ago.   On 03/29/2023 waiting to review her liver function. All of the abnormalities resolved after stopping letrozole.   6/27/2023: Liver enzymes remain normal.   No issues pertinent to this. She remains is observation.    On 12/28/2023 her liver function test is completely normal today. Aromasin is not producing any issues.    On 01/18/2024,  no issues pertinent.    On 01/31/2024, no abnormalities in liver function test related to Femara because this medicine was discontinued time ago. Minimal alteration in liver function test related to Kisqali today with no implications.    On 03/08/2024 her liver function tests are very abnormal today. She is not drinking any alcohol, she is not taking any antiinflammatory medication or excessive Tylenol. My gut feeling is that it is going to be the Aromasin as the offender similar to what the Femara was the offender. If that is the case I will go ahead and stop Aromasin today and repeat laboratory testing on a weekly basis CMP until we see what the trend of her numbers will be. If that is not sufficient to make the liver function test to improve we will need to discontinue the Kisqali and keep working on discontinuation of medicines until we find the culprit.    Planning eventually to replace the Aromasin for Faslodex depending on the circumstances.     On 04/10/2024, the patient again has drug-induced hepatitis. Aromasin was discontinued. The liver abnormalities continue. Kisqali was discontinued. The liver function tests are finally improving today. The question was the combination of medicines or only 1 medicine and I do not know the answer. I have no way to prove it.      5. Septic Arthritis  The patient has developed an episode of septic arthritis in many joints including left shoulder and left ankle. She required antibiotic therapy as per recently. Infectious Disease was involved in this. In fact I discussed the case with them on the telephone. I suggested choosing the PICC line to be placed on the right arm in order to be able to deliver antibiotic therapy according to the proper indication. She completed the PICC line and it was removed last week with no complications or problems. The right upper extremity is completely normal. It is difficult for me to know why she developed the septic arthritis. She is in  contact with horses all the time. The pathogen that she had in the joint is very uncommon in my opinion and the patient had no obvious source including no sinuses, no dental source, no obvious cardiac source, no gastrointestinal or genitourinary source and no skin source. It raises the question if this pathogen could evade to colonic source and I think I feel the obligation for this patient to have at least a CT scan of the abdomen and pelvis and eventually in several months from now consider a colonoscopy. Another consideration could be a Cologuard in some way. At least the patient’s infection seems to be under control. Her joints are still sore today including left shoulder, left ankle. Local inflammatory signs are very much gone. The only area of inflammation that she has in the 1st MP joint on the left hand probably represents osteoarthritis, no more than that. She will remain in observation from this point of view.  On 09/14/2022 the patient has no symptoms that would suggest any further spreading or new development of septic arthritis. My fear was related to the possible seeding of her hip replacement areas by bacteria during the bacteremia that she had not too long ago. It seems that that is not the case. Radiologically speaking I do not see any local inflammation or reaction in any of these anatomical sites. There is perfect symmetry in the hip areas in regard to all her hardware material. Her sedimentation rate is BACK TO normal. Her white count is normal and this process will be watched in absence of any other intervention.  On 10/25/2022 no new episodes of septic arthritis. I measured her sedimentation rate during the previous visit that was down to 9, previously was in the 8-9 category. This means resolution of an inflammatory process altogether.   On 12/28/2022 at this time she is experiencing her typical symptom of osteoarthritis in her hands and hips but no issues pertinent to septic arthritis  whatsoever. Deformities in the hands are ongoing due to osteoarthritis with no other issues or problems. No lymphedema in upper extremities.   On 03/29/2023 no significant sequela from her multiple foci and septic arthritis. What she has now is just typical osteoarthritis symptomatology and she uses Tylenol for this and occasional ibuprofen.   6/27/2023: Patient continues to report intermittent joint discomfort. Continues to manage pain with tylenol and occasional ibuprofen.   No issues pertinent to this.    On 12/28/2023 there is no evidence of recurrence of septic arthritis at this time.    On 01/18/2024, no issues pertinent.    On 01/31/2024, no septic arthritis symptomatology at this point.    On 03/08/2024 no evidence of septic arthritis but now she has a trigger finger middle on the left hand that is becoming a nuisance. She has the significant degenerative changes associated with osteoarthritis in both hands but I think if we correct this trigger finger she will be able to function better. Referral made to be seen by, Junior Delgado MD, Rheumatology.    On 04/10/2024, no evidence of septic arthritis.        6. Atrial Fibrillation  The patient developed atrial fibrillation with rapid ventricular response during the hospitalization with septic arthritis. Echocardiogram documented very dilated left atrium. She is now receiving Eliquis, metoprolol, and digoxin. Her ventricular response is controlled today but she remains in AFib. She has requested a followup with Dr. Odell and I went ahead and made her an appointment. 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 If she had a fall and she is taking anticoagulants the only thing that we are going to have is just trouble. She has sold one of the horses. She will sell the other horse very soon and she will remain on land for the time being. Her  is back in town and he is helping  her with consecution of groceries and things of this nature under the present circumstances. I advised her to minimize any trauma.  On 09/14/2022 the patient will be seen by Electrophysiology tomorrow in regard to consideration of cardioversion for her AFib. She has discussed this with Dr. Odell and I have reviewed this data. That is perfectly fine from my point of view. I find no form of contraindication from my side of the story if this is the methodology that is chosen to try to revert her to normal sinus rhythm.  On 10/25/2022 the patient is in normal sinus rhythm today. She remains on anticoagulants. She has cardiology appointment tomorrow. I asked her to buy a pulse oximeter and I taught her how to interpret the little wave curve of the pulse oximeter in regard to her heart rate. Typically patients in A-fib have very irregular pulse chaotic and the little wave curves will be continuously changing and besides the pulse rate will be continuously changing depending on the speed of the process. That is very simple to interpret. If se develops that problem I advised her to call her cardiologist.   On 12/28/2022 today she is in normal sinus rhythm by cardiac auscultation. Echocardiogram to be done by Danni Odell MD, in the next few days and further recommendations at that point. We strongly advised the patient about the continuation of Eliquis.   On 03/29/2023 clinically her heart obeys to normal sinus rhythm.   6/27/2023: Patient continues to be followed by Dr. Odell and has a stress test next week with Dr. Goodman. Patient remains on Eliquis with good tolerance.   On 09/27/2023 the patient has been in A-fib since the time that she was seen by, Danni Odell MD, at the time that she had cardiac stress testing. Her A-fib is under control today with medications in regard to rate but she raised the question when, Manjeet Gustafson MD, is going to see her. I do not know that question but I will go ahead and request an appointment  and send him a note.     On 12/28/2023 the patient is back into atrial fibrillation. Dr. Danni Odell, is controlling ventricular response and the patient is on Eliquis with no embolic phenomenon.     On 01/18/2024, no new issues pertinent to this. She continues being monitored by Dr. Odell.     On 01/31/2024, her atrial fibrillation remains controlled. Her QT interval is still applicable and normal. QT interval has not been modified by Kisqali administration. She remains on magnesium supplement.    On 03/08/2024 she remains in atrial fibrillation clinically with controlled ventricular response, no congestive heart failure, no angina. She remains on anticoagulant. No clinical bleeding.    On 04/10/2024, she remains in atrial fibrillation with controlled ventricular response. She remains on metoprolol at this time and Eliquis.      7. Grade 3 lymphedema in the left upper extremity    I went and made an urgent referral to the Lymphedema Clinic today to see if further bandage and drainage of this and massage would be helpful to her in the long run to control the problem. I still advised her to be extremely careful in regard to any injury or lesions in the skin of the left upper extremity to minimize any potentiality of cellulitis. In the long run if she has any episodes she will need to be back on antibiotics right away.   On 09/14/2022 the patient's lymphedema in the left upper extremity continues. She already has an appointment to be seen by the lymphedema clinic, she will require a new sleeve and massage and interventional therapy for this. She is aware that she needs to avoid any trauma in the left upper extremity to minimize any cellulitis. I strongly believe as posted before that septic arthritis is part of her lymphedema issues.   On 10/25/2022 her lymphedema in the left upper extremity is very much resolved with the sleeve and all of the manipulation that she has had in the lymphedema clinic. I advised her to be very  prudent in regard doing any nicking in the skin and damaging the skin pertinent to the left upper extremity to minimize cellulitis and potentiality for further problems.   On 12/28/2022 no lymphedema in either extremity. No need for sleeve use at this time.   On 03/29/2023 the patient has no leftover lymphedema in either extremity.  6/27/2023: Stable.   Lymphedema in the left upper extremity on 09/27/2023 is grade 1 and has not required any sleeve usage or elastic bandage at this time.     On 12/28/2023 her lymphedema in the left upper extremity is very minimal grade 1 at this point. No secondary.    On 01/18/2024, probably a grade 1 lymphedema in the left upper extremity.    On 01/31/2024, her lymphedema in the left upper extremity is gone.    On 03/08/2024 no lymphedema in either extremity at this time.    On 04/10/2024, no evidence of lymphedema in the left upper extremity at this time.        8. Hypertension  In regard to hypertension management I have controlled this before. Now she is taking quite amount of metoprolol. I will give up the management of this and now that she will see Dr. Odell, they will provide the care of this issue. I would not be surprised if the patient in the long run needs to be receiving another blood pressure medication given the fact that her blood pressure is still marginally elevated today. Taking digoxin, I do not think Lasix or hydrochlorothiazide will be a good choice because of the potential for hypokalemia unless the potassium is replaced and monitored very close. This will probably minimize the potentiality for digoxin toxicity.  On 09/14/2022 her blood pressure is controlled with the metoprolol that is provided by the cardiology team. She was advised again to avoid antiinflammatory medication for pain control.   On 10/25/2022 her blood pressure is under good control and I advised her to continue taking her blood pressure medication and once more I advised against the use of any  antiinflammatory medication by oral route.   On 12/28/2022 blood pressure under good control, medications will remain the same.  On 03/29/2023 her blood pressure remains under excellent control.   6/27/2023: Continues to be followed by Cardiology.   On 09/27/2023 her blood pressure is under good control.    On 12/28/2023 her blood pressure has been spiking including to a 190 systolic today in the office of, Danni Odell MD. Amlodipine was given. Her blood pressure today here is going down. The patient is eating exaggerated amount of salt and on top of that she is taking antiinflammatory nonsteroidal medications at least 6 times a week, all of this in conjunction with raised blood pressure. I told her one more time she cannot take antiinflammatory medication, she can only take Tylenol. Dr. Danni Odell, will be adjusting her blood pressure medicine accordingly.     On 01/18/2024, proper blood pressure control at this time.    On 01/31/2024, her blood pressure is in excellent range today, 124/87.    On 03/08/2024 her blood pressure is under good control. Her blood pressure medication will remain the same.     On 04/10/2024, blood pressure under good control at this time.      9. Insomnia  The problem is what to provide her for this problem at this time. There are cardiac issues. I do believe that this patient will be better off at least for the next 6 weeks taking a benzodiazapine or something of this nature more than any other medication. I do not feel compelled to give her gabapentin that actually has not worked for her in the past. Therefore I went ahead and prescribed for her medication in this regard today to take it at bedtime to see if this allows her to sleep properly.   On 10/25/2022 the patient persists having insomnia not sleeping more than 3 hours taking Ambien. I went ahead and discontinued this medicine. I gave her Seroquel 25 mg tablets to take 1 orally nightly. I asked her to call my nurse Priya Gonzalez next  week and let us know how she is doing in that arena.   6/27/2023: Patient is no longer on Seroquel. Insomnia is mildly improved.     On 09/27/2023 the patient is no longer requiring any insomnia medication.    On 12/28/2023 not requiring any insomnia medication.     On 01/18/2024, not requiring any medicine for this at this time.    On 01/31/2024, requiring Unisom for insomnia.    On 03/08/2024 Unisom will continue to be used for insomnia.    On 04/10/2024, Unisom remains ongoing for insomnia.      Plan:  In summary, on 04/10/2024, the patient has had a discontinuation of Aromasin and Kisqali given progressive hepatitis. Further laboratory testing will be obtained today as posted above. The patient's mother had primary biliary cirrhosis. She had a liver transplantation on 2 different occasions. She lived 18 years after that. Raises the question if we are witnessing something similar even though she has no typical clinical manifestations of this condition like pruritis. Besides there is no jaundice and there is no elevation of the alkaline phosphatase.    The patient's case will be presented in the Lung Cancer Conference and Breast Cancer Conference by me and once that we have a response and answer, we will communicate these issues to the patient.      Patient remains on high risk medication and requires close monitoring for toxicity.

## 2024-04-11 DIAGNOSIS — R91.1 LUNG NODULE: Primary | ICD-10-CM

## 2024-04-11 LAB
CENTROMERE B AB SER-ACNC: <0.2 AI (ref 0–0.9)
CHROMATIN AB SERPL-ACNC: <0.2 AI (ref 0–0.9)
DSDNA AB SER-ACNC: 1 IU/ML (ref 0–9)
ENA JO1 AB SER-ACNC: <0.2 AI (ref 0–0.9)
ENA RNP AB SER-ACNC: <0.2 AI (ref 0–0.9)
ENA SCL70 AB SER-ACNC: <0.2 AI (ref 0–0.9)
ENA SM AB SER-ACNC: <0.2 AI (ref 0–0.9)
ENA SS-A AB SER-ACNC: <0.2 AI (ref 0–0.9)
ENA SS-B AB SER-ACNC: <0.2 AI (ref 0–0.9)
Lab: NORMAL
MITOCHONDRIA M2 IGG SER-ACNC: <20 UNITS (ref 0–20)

## 2024-04-12 ENCOUNTER — TELEPHONE (OUTPATIENT)
Dept: ONCOLOGY | Facility: CLINIC | Age: 64
End: 2024-04-12
Payer: MEDICARE

## 2024-04-12 DIAGNOSIS — Z17.0 MALIGNANT NEOPLASM OF OVERLAPPING SITES OF LEFT BREAST IN FEMALE, ESTROGEN RECEPTOR POSITIVE: Primary | ICD-10-CM

## 2024-04-12 DIAGNOSIS — C50.812 MALIGNANT NEOPLASM OF OVERLAPPING SITES OF LEFT BREAST IN FEMALE, ESTROGEN RECEPTOR POSITIVE: Primary | ICD-10-CM

## 2024-04-12 NOTE — TELEPHONE ENCOUNTER
On 04/12/2024, I have discussed with the patient on the telephone today the following items.   1. I presented her case in the Multidisciplinary Lung Cancer Conference asking the specific question if the patient could be a candidate for surgical intervention. According to Dr. Delacruz, this will require a major surgery and maybe the margins are not going to be clear. The patient anyway is not interested in surgery at this time because she wants to have some other options more than surgical intervention. She already was made an appointment to be seen by Dr. Delacruz; this will be cancelled on Monday.   2. The patient's case was presented in the Multidisciplinary Breast Cancer Conference. The opinion was there that first of all we need to let the liver function test cool off completely and I pointed out to the patient that I repeated her hepatitis A, B and C serologies being negative, an AARON that was negative, looking for autoimmune hepatitis, and also a mitochondrial antibody looking for primary biliary cirrhosis that was negative. Those are good news. We know that her liver function test is already improving now that she has discontinued the Kisqali that was the most likely offending medication. Therefore, the question is how to treat her. There are many options including the utilization of Faslodex that is probably less toxic, the most effective and the simplest way to take care of her problem and for that reason, once that her liver normalizes in regard to enzymes, she will proceed with the typical loading dose and then the monthly shot. She will require blood work almost on weekly basis when that goes on. Also, eventually according to Dr. Miguel, we will put Ibrance to the patient and see how things evolve from that point of view.     Also, we discussed options in regard to radiation therapy and everybody agreed that she could be a candidate to consider radiation treatment in spite of this being directed in front of  the pericardium. She had substantial benefit if the epicardial nodes that were radiated before. In fact, these nodes are not hot on the PET scan.     Finally, we discussed also the fact that she has an ESR1 mutation and she could qualify to use Orserdu tablets. The problem is this medicine has a lot of liver toxicity possibility and I am not sure that I want to go in this direction with this patient now or in the future. Her liver already has documented to us extreme sensitivity to medications.     Therefore, all these appointments will be made next week to proceed with testing as follows:  1. She will require weekly CMP.   2. She needs to have referral to Radiation Oncology.  3. She needs to have appointment with Dr. Dixon in 2 weeks.   4. We need to cancel the appointment to see Lani Delacruz MD.   5. The patient will require confirmation of all these appointments next week again. Once that she has complete normalization of her liver function, we will proceed with Faslodex.

## 2024-04-15 ENCOUNTER — TELEPHONE (OUTPATIENT)
Dept: ONCOLOGY | Facility: CLINIC | Age: 64
End: 2024-04-15
Payer: MEDICARE

## 2024-04-18 ENCOUNTER — TELEPHONE (OUTPATIENT)
Dept: RADIATION ONCOLOGY | Facility: HOSPITAL | Age: 64
End: 2024-04-18
Payer: MEDICARE

## 2024-04-19 ENCOUNTER — TELEPHONE (OUTPATIENT)
Dept: ONCOLOGY | Facility: CLINIC | Age: 64
End: 2024-04-19
Payer: MEDICARE

## 2024-04-19 ENCOUNTER — APPOINTMENT (OUTPATIENT)
Dept: ONCOLOGY | Facility: HOSPITAL | Age: 64
End: 2024-04-19
Payer: MEDICARE

## 2024-04-19 ENCOUNTER — CONSULT (OUTPATIENT)
Dept: RADIATION ONCOLOGY | Facility: HOSPITAL | Age: 64
End: 2024-04-19
Payer: MEDICARE

## 2024-04-19 ENCOUNTER — LAB (OUTPATIENT)
Dept: LAB | Facility: HOSPITAL | Age: 64
End: 2024-04-19
Payer: MEDICARE

## 2024-04-19 VITALS
DIASTOLIC BLOOD PRESSURE: 95 MMHG | SYSTOLIC BLOOD PRESSURE: 128 MMHG | OXYGEN SATURATION: 97 % | WEIGHT: 177.6 LBS | BODY MASS INDEX: 29.55 KG/M2 | HEART RATE: 58 BPM

## 2024-04-19 DIAGNOSIS — Z17.0 MALIGNANT NEOPLASM OF OVERLAPPING SITES OF LEFT BREAST IN FEMALE, ESTROGEN RECEPTOR POSITIVE: Primary | ICD-10-CM

## 2024-04-19 DIAGNOSIS — C50.812 MALIGNANT NEOPLASM OF OVERLAPPING SITES OF BOTH BREASTS IN FEMALE, ESTROGEN RECEPTOR POSITIVE: ICD-10-CM

## 2024-04-19 DIAGNOSIS — Z17.0 MALIGNANT NEOPLASM OF OVERLAPPING SITES OF BOTH BREASTS IN FEMALE, ESTROGEN RECEPTOR POSITIVE: ICD-10-CM

## 2024-04-19 DIAGNOSIS — Z17.0 MALIGNANT NEOPLASM OF OVERLAPPING SITES OF LEFT BREAST IN FEMALE, ESTROGEN RECEPTOR POSITIVE: ICD-10-CM

## 2024-04-19 DIAGNOSIS — C50.812 MALIGNANT NEOPLASM OF OVERLAPPING SITES OF LEFT BREAST IN FEMALE, ESTROGEN RECEPTOR POSITIVE: Primary | ICD-10-CM

## 2024-04-19 DIAGNOSIS — C50.812 MALIGNANT NEOPLASM OF OVERLAPPING SITES OF LEFT BREAST IN FEMALE, ESTROGEN RECEPTOR POSITIVE: ICD-10-CM

## 2024-04-19 DIAGNOSIS — C50.811 MALIGNANT NEOPLASM OF OVERLAPPING SITES OF BOTH BREASTS IN FEMALE, ESTROGEN RECEPTOR POSITIVE: ICD-10-CM

## 2024-04-19 LAB
ALBUMIN SERPL-MCNC: 4.4 G/DL (ref 3.5–5.2)
ALBUMIN/GLOB SERPL: 1.5 G/DL
ALP SERPL-CCNC: 112 U/L (ref 39–117)
ALT SERPL W P-5'-P-CCNC: 481 U/L (ref 1–33)
ANION GAP SERPL CALCULATED.3IONS-SCNC: 9.2 MMOL/L (ref 5–15)
AST SERPL-CCNC: 249 U/L (ref 1–32)
BILIRUB SERPL-MCNC: 0.5 MG/DL (ref 0–1.2)
BUN SERPL-MCNC: 17 MG/DL (ref 8–23)
BUN/CREAT SERPL: 18.5 (ref 7–25)
CALCIUM SPEC-SCNC: 9.6 MG/DL (ref 8.6–10.5)
CHLORIDE SERPL-SCNC: 101 MMOL/L (ref 98–107)
CO2 SERPL-SCNC: 28.8 MMOL/L (ref 22–29)
CREAT SERPL-MCNC: 0.92 MG/DL (ref 0.57–1)
EGFRCR SERPLBLD CKD-EPI 2021: 68.8 ML/MIN/1.73
GLOBULIN UR ELPH-MCNC: 2.9 GM/DL
GLUCOSE SERPL-MCNC: 96 MG/DL (ref 65–99)
POTASSIUM SERPL-SCNC: 4.1 MMOL/L (ref 3.5–5.2)
PROT SERPL-MCNC: 7.3 G/DL (ref 6–8.5)
SODIUM SERPL-SCNC: 139 MMOL/L (ref 136–145)

## 2024-04-19 PROCEDURE — G0463 HOSPITAL OUTPT CLINIC VISIT: HCPCS | Performed by: RADIOLOGY

## 2024-04-19 PROCEDURE — 80053 COMPREHEN METABOLIC PANEL: CPT

## 2024-04-19 PROCEDURE — 36415 COLL VENOUS BLD VENIPUNCTURE: CPT

## 2024-04-19 NOTE — TELEPHONE ENCOUNTER
Dr. Dixon reviewed liver enzymes today. Called and relayed message that she should stop her Eliquis and take only aspirin 81mg per Dr. Dixon. Called and left a message w/ cardiologist office to inform them of this change we made. No answer. Will attempt to call again next week. Pt v/u. Ekta Gonzalez RN

## 2024-04-19 NOTE — TELEPHONE ENCOUNTER
I asked my nurse, Ekta BHATT, RN, to call this patient today in regard to her liver function tests that are not improving after stopping Kisqali several days ago. By now if the medicine was the culprit that would be expecting to be the case. On the other hand actually the liver tests are a little bit worse. That raises the question if we are in the wrong medicine. Similar situation happened and finally we found that the Femara was the culprit. Retrospectively, Eliquis can produce liver toxicity in the form of hepatitis. That is reported clearly in the package insert. In spite of being in atrial fibrillation with controlled ventricular response, I asked my nurse to instruct the patient to take an 81 mg aspirin every day and discontinue the Eliquis at this point and continue rechecking her liver function test on weekly basis. This was done by Ms. Ekta BHATT, and discussion on the telephone with the patient.

## 2024-04-22 ENCOUNTER — TELEPHONE (OUTPATIENT)
Age: 64
End: 2024-04-22
Payer: MEDICARE

## 2024-04-22 ENCOUNTER — HOSPITAL ENCOUNTER (OUTPATIENT)
Dept: RADIATION ONCOLOGY | Facility: HOSPITAL | Age: 64
Setting detail: RADIATION/ONCOLOGY SERIES
End: 2024-04-22
Payer: MEDICARE

## 2024-04-22 PROCEDURE — 77334 RADIATION TREATMENT AID(S): CPT | Performed by: RADIOLOGY

## 2024-04-22 PROCEDURE — 77470 SPECIAL RADIATION TREATMENT: CPT | Performed by: RADIOLOGY

## 2024-04-22 PROCEDURE — 77263 THER RADIOLOGY TX PLNG CPLX: CPT | Performed by: RADIOLOGY

## 2024-04-22 NOTE — TELEPHONE ENCOUNTER
Please see recent note from Dr. Constantino, he has taken patient off of a-ban.    Tamie with Dr. Constantino called to make us aware.

## 2024-04-24 ENCOUNTER — LAB (OUTPATIENT)
Dept: LAB | Facility: HOSPITAL | Age: 64
End: 2024-04-24
Payer: MEDICARE

## 2024-04-24 ENCOUNTER — OFFICE VISIT (OUTPATIENT)
Dept: ONCOLOGY | Facility: CLINIC | Age: 64
End: 2024-04-24
Payer: MEDICARE

## 2024-04-24 VITALS
WEIGHT: 175 LBS | DIASTOLIC BLOOD PRESSURE: 89 MMHG | OXYGEN SATURATION: 99 % | HEART RATE: 73 BPM | HEIGHT: 65 IN | SYSTOLIC BLOOD PRESSURE: 123 MMHG | BODY MASS INDEX: 29.16 KG/M2

## 2024-04-24 DIAGNOSIS — C50.812 MALIGNANT NEOPLASM OF OVERLAPPING SITES OF LEFT BREAST IN FEMALE, ESTROGEN RECEPTOR POSITIVE: ICD-10-CM

## 2024-04-24 DIAGNOSIS — C50.812 MALIGNANT NEOPLASM OF OVERLAPPING SITES OF LEFT BREAST IN FEMALE, ESTROGEN RECEPTOR POSITIVE: Primary | ICD-10-CM

## 2024-04-24 DIAGNOSIS — Z17.0 MALIGNANT NEOPLASM OF OVERLAPPING SITES OF LEFT BREAST IN FEMALE, ESTROGEN RECEPTOR POSITIVE: ICD-10-CM

## 2024-04-24 DIAGNOSIS — Z17.0 MALIGNANT NEOPLASM OF OVERLAPPING SITES OF LEFT BREAST IN FEMALE, ESTROGEN RECEPTOR POSITIVE: Primary | ICD-10-CM

## 2024-04-24 LAB
ALBUMIN SERPL-MCNC: 4.3 G/DL (ref 3.5–5.2)
ALBUMIN/GLOB SERPL: 1.5 G/DL
ALP SERPL-CCNC: 120 U/L (ref 39–117)
ALT SERPL W P-5'-P-CCNC: 508 U/L (ref 1–33)
ANION GAP SERPL CALCULATED.3IONS-SCNC: 9.5 MMOL/L (ref 5–15)
AST SERPL-CCNC: 253 U/L (ref 1–32)
BASOPHILS # BLD AUTO: 0.05 10*3/MM3 (ref 0–0.2)
BASOPHILS NFR BLD AUTO: 1.2 % (ref 0–1.5)
BILIRUB SERPL-MCNC: 0.5 MG/DL (ref 0–1.2)
BUN SERPL-MCNC: 14 MG/DL (ref 8–23)
BUN/CREAT SERPL: 15.7 (ref 7–25)
CALCIUM SPEC-SCNC: 9.5 MG/DL (ref 8.6–10.5)
CHLORIDE SERPL-SCNC: 104 MMOL/L (ref 98–107)
CO2 SERPL-SCNC: 26.5 MMOL/L (ref 22–29)
CREAT SERPL-MCNC: 0.89 MG/DL (ref 0.57–1)
DEPRECATED RDW RBC AUTO: 59.5 FL (ref 37–54)
EGFRCR SERPLBLD CKD-EPI 2021: 71.6 ML/MIN/1.73
EOSINOPHIL # BLD AUTO: 0.13 10*3/MM3 (ref 0–0.4)
EOSINOPHIL NFR BLD AUTO: 3.1 % (ref 0.3–6.2)
ERYTHROCYTE [DISTWIDTH] IN BLOOD BY AUTOMATED COUNT: 16.1 % (ref 12.3–15.4)
GLOBULIN UR ELPH-MCNC: 2.8 GM/DL
GLUCOSE SERPL-MCNC: 108 MG/DL (ref 65–99)
HCT VFR BLD AUTO: 43.2 % (ref 34–46.6)
HGB BLD-MCNC: 13.7 G/DL (ref 12–15.9)
IMM GRANULOCYTES # BLD AUTO: 0.01 10*3/MM3 (ref 0–0.05)
IMM GRANULOCYTES NFR BLD AUTO: 0.2 % (ref 0–0.5)
LYMPHOCYTES # BLD AUTO: 1.23 10*3/MM3 (ref 0.7–3.1)
LYMPHOCYTES NFR BLD AUTO: 29.8 % (ref 19.6–45.3)
MCH RBC QN AUTO: 31.1 PG (ref 26.6–33)
MCHC RBC AUTO-ENTMCNC: 31.7 G/DL (ref 31.5–35.7)
MCV RBC AUTO: 98 FL (ref 79–97)
MONOCYTES # BLD AUTO: 0.32 10*3/MM3 (ref 0.1–0.9)
MONOCYTES NFR BLD AUTO: 7.7 % (ref 5–12)
NEUTROPHILS NFR BLD AUTO: 2.39 10*3/MM3 (ref 1.7–7)
NEUTROPHILS NFR BLD AUTO: 58 % (ref 42.7–76)
NRBC BLD AUTO-RTO: 0 /100 WBC (ref 0–0.2)
PLATELET # BLD AUTO: 206 10*3/MM3 (ref 140–450)
PMV BLD AUTO: 9 FL (ref 6–12)
POTASSIUM SERPL-SCNC: 4.2 MMOL/L (ref 3.5–5.2)
PROT SERPL-MCNC: 7.1 G/DL (ref 6–8.5)
RBC # BLD AUTO: 4.41 10*6/MM3 (ref 3.77–5.28)
SODIUM SERPL-SCNC: 140 MMOL/L (ref 136–145)
WBC NRBC COR # BLD AUTO: 4.13 10*3/MM3 (ref 3.4–10.8)

## 2024-04-24 PROCEDURE — 80053 COMPREHEN METABOLIC PANEL: CPT

## 2024-04-24 PROCEDURE — 99214 OFFICE O/P EST MOD 30 MIN: CPT | Performed by: INTERNAL MEDICINE

## 2024-04-24 PROCEDURE — 85025 COMPLETE CBC W/AUTO DIFF WBC: CPT

## 2024-04-24 PROCEDURE — 3074F SYST BP LT 130 MM HG: CPT | Performed by: INTERNAL MEDICINE

## 2024-04-24 PROCEDURE — 1126F AMNT PAIN NOTED NONE PRSNT: CPT | Performed by: INTERNAL MEDICINE

## 2024-04-24 PROCEDURE — 36415 COLL VENOUS BLD VENIPUNCTURE: CPT

## 2024-04-24 PROCEDURE — 3079F DIAST BP 80-89 MM HG: CPT | Performed by: INTERNAL MEDICINE

## 2024-04-24 NOTE — PROGRESS NOTES
Subjective     REASON FOR FOLLOW UP:  1. 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 R BREAST MASS WITH GIANT R AXILLARY ADENOPATHY.  SHE UNDERWENT DOSE DENSE AC, TAXOL, BILATERAL MASTECTOMIES, DRAMATIC NEAR COMPLETE DE, RADIATION THERAPY AND ADJUVANT FEMARA STOPPED ON 12/21 BECAUSE DRUG INDUCED HEPATITIS, SWITCHED TO AROMASIN 2/22: NO SIDE EFFECTS.    3. FAMILY HISTORY OF BREAST CANCER IN MOTHER AND SISTER, SISTER WAS TESTED FOR BRCA AND WAS NEGATIVE.    4. LEFT OVARIAN CYST , IT HAS BEEN PRESENT FOR LONG TIME BUT IS GETTING LARGER, ASYMPTOMATIC, NEED FOR , PELVIC US AND GYN ONC EVal: no need for any intervention                  5. LEFT HIP PAIN SEVERE ARTHRITIS, REAL NEED FOR LEFT HIP REPLACEMENT: PERFORMED 8/21    6. DRUG INDUCED HEPATITIS,  FINALLY STOPPED FEMARA: DRAMATIC IMPROVEMENT AND RESOLUTION.      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 We have discontinued her Aromasin, her Kisqali, now her Eliquis looking for the improvement in liver functions but this has not happened. The patient is not drinking any alcohol. She is not taking any anti-inflammatory medications. She is not taking Tylenol. She is taking only Unisom for sleeping at nighttime. She denies any abdominal pain, nausea, vomiting, jaundice, fever, or chills. She has metastatic breast cancer to the retrosternal area. She has been seen by Radiation Oncology today in preparation for initiation of treatment to the site of metastasis.     Physically, she otherwise feels well with no cough, sputum production, or shortness of breath. Appetite remains good. Bowel activity and urination remain normal. Urine color remains normal. Typically, joint pain in hips and hands depending on physical activity. No neurological symptoms. She remains on  aspirin. I personally discontinued the Eliquis on this patient and making her aware of the risk that she is having in the background of atrial fibrillation in regard to developing a stroke. A stroke that she had in the past was related to hypertension, not related to atrial fibrillation.              Past Medical History:   Diagnosis Date    Anemia in neoplastic disease     Arthritis     Arthritis of back     Arthritis of neck     Breast cancer     Left    CVA (cerebral vascular accident)     Encounter for long-term (current) use of other medications 06/22/2021    Fracture, fibula     Fracture, finger     Frozen shoulder     Hip arthrosis 01/17    Hip pain     RIGHT HIP... CYST    History of fracture of leg 1987    History of radiation therapy     LAST TREATMENT      Hypertension     Knee swelling     Limited joint range of motion (ROM)     RIGHT HIP    Low back strain     Periarthritis of shoulder     Rotator cuff syndrome 6/22    Skin sore     OPEN SORE LEFT BREAST    Syncope     Vertigo            Past Surgical History:   Procedure Laterality Date    AXILLARY LYMPH NODE BIOPSY/EXCISION Right     LYMPH NODE UNDER RIGHT ARM-MALIGNANT (DOUBLE MASTECTOMY)    BREAST BIOPSY Left     MALIGNANT    FRACTURE SURGERY  1987    Leg    HARDWARE REMOVAL      INCISION AND DRAINAGE LEG Left 06/30/2022    Procedure: INCISION AND DRAINAGE left ankle;  Surgeon: Kenneth Tran MD;  Location: [facility];  Service: Orthopedics;  Laterality: Left;    JOINT REPLACEMENT Bilateral mar 2020,july 2021    hips    MASTECTOMY W/ SENTINEL NODE BIOPSY Bilateral 09/16/2019    Procedure: BILATERLA MODIFIED RADICAL MASTECTOMY WITH BILATERAL SENTINEL LYMPH NODE BIOPSY;  Surgeon: Joby Barron Jr., MD;  Location: [facility];  Service: General    TOTAL HIP ARTHROPLASTY Right 03/02/2020    Procedure: RIGHT TOTAL HIP ARTHROPLASTY NATALY NAVIGATION;  Surgeon: Luis M Leonard MD;  Location: [facility];  Service: Orthopedics;   Laterality: Right;    TOTAL HIP ARTHROPLASTY Left 07/20/2021    Procedure: Posterior LEFT TOTAL HIP ARTHROPLASTY NATALY NAVIGATION;  Surgeon: Luis M Leonard MD;  Location: [facility];  Service: Orthopedics;  Laterality: Left;    VENOUS ACCESS DEVICE (PORT) INSERTION Right 02/01/2019    Procedure: INSERTION VENOUS ACCESS DEVICE;  Surgeon: Joby Barron Jr., MD;  Location: [facility];  Service: General    VENOUS ACCESS DEVICE (PORT) REMOVAL N/A 10/30/2019    Procedure: Mediport Removal;  Surgeon: Joby Barron Jr., MD;  Location: [facility];  Service: General        Current Outpatient Medications on File Prior to Visit   Medication Sig Dispense Refill    amLODIPine (NORVASC) 2.5 MG tablet Take 1 tablet by mouth Daily. 30 tablet 11    doxylamine (UNISOM) 25 MG tablet Take 1 tablet by mouth At Night As Needed for Sleep.      metoprolol succinate XL (TOPROL-XL) 50 MG 24 hr tablet Take 2 tablets po q am and one tablet po q pm 270 tablet 3    polyethylene glycol 17 g packet Take 17 g by mouth 2 (Two) Times a Day As Needed (constipation).      tiZANidine (ZANAFLEX) 4 MG tablet Take 1 tablet by mouth Every 6 (Six) Hours As Needed for Muscle Spasms.      [DISCONTINUED] apixaban (ELIQUIS) 5 MG tablet tablet Take 1 tablet by mouth Every 12 (Twelve) Hours. Indications: Atrial Fibrillation 60 tablet 5    [DISCONTINUED] digoxin (LANOXIN) 125 MCG tablet Take 1 tablet by mouth Daily.       Current Facility-Administered Medications on File Prior to Visit   Medication Dose Route Frequency Provider Last Rate Last Admin    Chlorhexidine Gluconate Cloth 2 % pads 1 each  1 each Apply externally Take As Directed Luis M Leonard MD            ALLERGIES:    Allergies   Allergen Reactions    Benadryl [Diphenhydramine] Itching     INCREASES BLOOD PRESSURE    Erythromycin GI Intolerance    Levaquin [Levofloxacin] GI Intolerance    Penicillins GI Intolerance        Social History     Socioeconomic History    Marital status:  "     Spouse name: Cruz    Number of children: 0   Tobacco Use    Smoking status: Never    Smokeless tobacco: Never   Vaping Use    Vaping status: Never Used   Substance and Sexual Activity    Alcohol use: Yes     Comment: occasional    Drug use: No    Sexual activity: Not Currently     Partners: Male     Birth control/protection: Abstinence        Family History   Problem Relation Age of Onset    Lung cancer Father     Irritable bowel syndrome Father     Cancer Mother     Breast cancer Mother     Liver disease Mother     Breast cancer Sister 48    Breast cancer Maternal Grandmother     Breast cancer Paternal Grandmother     Breast cancer Maternal Uncle     Malig Hyperthermia Neg Hx             Objective     Vitals:    04/24/24 1234   BP: 123/89   Pulse: 73   SpO2: 99%   Weight: 79.4 kg (175 lb)   Height: 165.1 cm (65\")   PainSc: 0-No pain             4/24/2024    12:35 PM   Current Status   ECOG score 0     Exam:               GENERAL:  Well-developed, Patient  in no acute distress.   SKIN:  Warm, dry ,NO purpura ,no rash.  HEENT:  Pupils were equal and reactive to light and accomodation, conjunctivae noninjected,  normal visual acuity.   NECK:  Supple with good range of motion; no thyromegaly , no JVD or bruits,.No carotid artery pain, no carotid abnormal pulsation   LYMPHATICS:  No cervical, NO supraclavicular, NO axillary, NO inguinal adenopathies.  CARDIAC   normal rate , regular rhythm, without murmur,NO rubs NO S3 NO S4   LUNGS: normal breath sounds bilateral, no wheezing, NO rhonchi, NO crackles ,NO rubs.  VASCULAR VENOUS: no cyanosis, NO collateral circulation, NO varicosities, NO edema, NO palpable cords, NO pain,NO erythema, NO pigmentation of the skin.  ABDOMEN:  Soft, NO pain,no hepatomegaly, no splenomegaly,no masses, no ascites, no collateral circulation,no distention.  EXTREMITIES  AND SPINE:  No clubbing, no cyanosis ,no deformities , no pain .No kyphosis,  no pain in spine, no pain in ribs " , no pain in pelvic bone.  NEUROLOGICAL:  Patient was awake, alert, oriented to time, person and place.    RECENT LABS:  Lab Results   Component Value Date    WBC 4.13 04/24/2024    HGB 13.7 04/24/2024    HCT 43.2 04/24/2024    MCV 98.0 (H) 04/24/2024     04/24/2024       Lab Results   Component Value Date    GLUCOSE 108 (H) 04/24/2024    BUN 14 04/24/2024    CREATININE 0.89 04/24/2024    EGFR 71.6 04/24/2024    BCR 15.7 04/24/2024    K 4.2 04/24/2024    CO2 26.5 04/24/2024    CALCIUM 9.5 04/24/2024    ALBUMIN 4.3 04/24/2024    BILITOT 0.5 04/24/2024     (C) 04/24/2024     (C) 04/24/2024        Assessment & Plan    1.  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   For at least 4 years, she has had an in crescendo mass in the left breast that has produced a gigantic tumor that was close to 25 cm in size and is replacing most of the anatomy of the left breast. There are areas of ulceration necrosis and bleeding and the mass is fixed to the chest wall. She has abundant amount of collateral circulation in the left anterior chest wall and it caught my attention the lack of any left axillary adenopathy. She has no lymphedema in the left upper extremity. In the right breast while in sitting position, no palpable abnormalities are documented. The right breast skin and nipple are normal. When the patient lies flat, there is a palpable mass at 9 o'clock from the nipple that measures probably 4 cm in size, very indistinct in regard to edges and margins. There was no mobility and no areas of tenderness. She has a giant adenopathy in the right axilla that measures close to 9 cm in size, uniform, no fluctuation, nontender, completely fixed to the axillary wall. There is no compromise of the skin.   Patient completed 4 cycles of neoadjuvant AC on 4/12/2019.    Patient completed neoadjuvant weekly Taxol x 12  treatments on 7/19/2019.  9/16/2019 bilateral mastectomy by Dr. Joby Barron with axillary lymph node dissection.  No residual malignancy.  10/30/2019 patient's Mediport was removed in anticipation of radiation.  Radiation therapy under the care of Dr. Marmolejo 10/31/2019-1/13/2020 to the right chest wall.  Started on adjuvant Femara 2.5 mg daily 8/20/2019.  9/21/2020 continues on adjuvant Femara, tolerating it quite well.  Some mild hot flashes and mild arthralgias, all which are tolerable.  The patient returned to the office on 05/24/2021. Since the previous visit the patient proceeded with a PET scan and I have reviewed this with her in the PAC system showing chest wall on the left side area of enlightening SUV activity that is almost 3 cm long x 7 mm wide. It is behind the bone. It is very close to the lower aspect of her heart. The reset of the PET scan discloses no activity. This is also specific in regard to the ovaries and actually an ultrasound of her vagina looking into the ovaries documented an unilocular cyst on the left side with typical features of benignity and a  was completely normal 5.5.   The patient was further reviewed on 07/07/2021 after she has continued her Kisqali and completion of the 1st round of the medicine. Today her EKG disclosed a normal QT interval and this has been sent to Cardiology to be reported officially. 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 She has completed her radiation therapy to the epicardial right lymph node with no difficulties or side effects.   The patient was further reviewed on 09/30/2021. Patient has recovered well from her left hip replacement. She completed her Kisqali a week ago. Her white count is low today. The hemoglobin is stable. The platelet count is stable. Elevation in her liver function test, SGOT, SGPT noted. The patient is not drinking any alcohol. She is not taking any cholesterol medicine. She is not taking  any anti-inflammatory medication and leads me to think that Kisqali is the culprit of this. Given these findings we advised the patient to put the Kisqali on hold until we recheck chemistry profile on weekly basis for the next 3 or 4 weeks. We need to settle these numbers down before she continues the Kisqali. She probably will require dose adjustment at some point. Again, her hepatitis A, B and C serologies are negative.   n regard to her history of breast cancer she was reviewed on 11/19/2021. Since the previous visit the patient has had tumor marker CA 15-3 that has dropped to 20. Radiological assessment of her chest and abdomen CT scan that documents resolution of the epicardial lymphadenopathy in the right hemithorax, no pulmonary nodules or metastasis, no pleural effusion and no liver metastasis. No bone metastasis. Based on this information I do believe that the breast cancer is relatively quiet clinically, biochemically and radiologically and I advised her today to resume her Femara at the dose of 2.5 mg a day. The likelihood of Femara producing liver dysfunction is more than remote.   The patient was further reviewed on 12/29/2021. Recent review of her liver function tests a few days ago documented persistent elevation of her SGOT, SGPT, and alkaline phosphatase. She has discontinued most of the medicines and I have no other choice but discontinue the Femara. Since then and actually today is the 1st day in many weeks that the SGOT and SGPT and alkaline phosphatase are improving. The patient actually remains asymptomatic in this regard. Her liver is not enlarged. She has no jaundice. She has no rash, no skin lesions and no other new alterations. That is good news. Her white blood count, hemoglobin and platelets remain stable. It seems that we are getting to the final diagnosis that Femara is the culprit medicine in regard to the hepatitis induced by medication documented pathologically through a liver  biopsy. That is extremely rare. As I posted above, I discussed with all my partners in regard to how many times through their careers prescribing Femara to multiple breast cancers they have seen Femara triggering hepatitis, none of them gave me a positive answer. I reviewed this information in [employer] and it is reported in less than 1% of patients taking this medication. I think we are in front of a rare situation but I want for her to withhold any further Femara treatment for the time being and I want to review her back in 3 weeks. If the liver function tests continue improving or normalize by then maybe we will have the answer once and for all. Raises the question what we will do next and I think the next medicine will be the utilization of Aromasin that has a different chemical component and different methodology for action. I made her aware of that. We are not going to go back in giving her Kisqali under the present circumstances given the confusion and all the issues pertinent to her liver dysfunction.   The patient was further reviewed on 03/07/2022. Since the previous visit the patient has stopped the Femara in 12/2021 and today her liver function tests are completely back to normality. This proves the point that Femara was the culprit phenomenon that I never have seen and discussed with my partner, Danelle Cespedes MD. He never has seen this neither.   The patient returned on 05/09/2022 with no symptoms of anything in particular besides minimal respiratory allergies and pain in the left hip associated with her previous surgery. Her clinical examination today is very much unremarkable, she looks fantastic. She has no symptoms or signs of breast cancer recurrence. White count, hemoglobin and platelets are normal. Her tumor marker CA 15-3 has remained normal and actually lower than ever and compliance with Aromasin has been 100% with excellent tolerance.   Given the circumstances of this I advised her to remain  on Aromasin 25 mg a day and I find no use for this patient to go back onto Kisqali or medicines of this nature.   On 08/16/2022 we reviewed the patient in regard to her history of breast cancer. Clinically she has not had any obvious recurrent disease in the chest wall in the lung anatomy or abdomen or pelvis. She remains on Aromasin adjuvantly and we are waiting to review tumor marker. She has a chest wall that is completely flat due to previous mastectomies and radiation therapy. There is no axillary adenopathies and the patient has in my opinion control of her disease process as far as we can tell. I advised her to remain on Aromasin 25 mg a day. I went ahead and scheduled a visit with us in a few weeks in order to further review radiological analysis that will be discussed below.  On 09/14/2022 the patient has had in the interim a CT scan of the chest, abdomen and pelvis for review personally. Her pulmonary anatomy remains normal, there is no pulmonary congestion, pleural effusions, pericardial effusion, cardiomegaly, hilar or mediastinal adenopathy. There is prepericardiac lymph node measuring almost 1.5 to 2 cm in size that is flat like a small finger that has been present before and has minimally enlarged. If this has anything to do with her previous history of breast cancer is difficult to state but this has been evaluated before with similarity in regards size and some degree of fluctuation. I think this does not constrain me from thinking with a normal tumor marker of CA 15-3 of 20 during the previous visit or compel to perform either removal or biopsy at this time or proceed with radiological assessment with a PET scan. I do believe that this is not representation of anything in particular. Her liver anatomy and the rest of the bony anatomy, pancreas, kidneys, spleen and retroperitoneum remain unchanged. Given this finding I advised the patient to proceed and continue her Aromasin without the need to add  any other medication to her regimen of medication especially in the background of A-fib. I advised her to proceed with reassessment with me in 6 weeks with a repeat CBC, CMP and CA 15-3. In that moment also we will obtain a liquid biopsy for further analysis. Her liver function test has remained normal and Aromasin has not produced any chemical hepatitis. I advised her again to remain on this medicine by itself.   On 10/25/2022 the patient remains on Aromasin. She has no symptoms or signs that would indicate breast cancer recurrence and the latest tumor marker CA 15-3 was 20 two months ago. We have another one pending today. Given these circumstances I advised her to remain on Aromasin for the time being. I find no need for radiological assessment at this time. She will be called at home with the report of her tumor marker.   On 12/28/2022 the patient had no symptoms or signs of breast cancer progression or recurrence on any level. Tolerance to Aromasin is excellent with no side effects and she has 100% compliance on the medication. She was advised to remain on the medication. Her tumor marker CA 15-3 has remained normal. No plans for radiological assessment unless new clinical changes occur or tumor marker changes.   On 03/29/2023 the patient has no symptoms or signs of breast cancer recurrence. She continues taking her aromatase inhibitor medicine on a routine basis with 100% compliance and no significant side effects from the medication. Clinically she has no evidence of breast cancer recurrence and during the previous visit her CA 15-3 remained normal, another one is pending today that will be discussed with her on the telephone once it becomes available. Given the stability of her clinical picture and the excellent clinical status I advised the patient to remain on her medicine for the time being returning to see us back every 3 months with a CBC, CMP and CA 15-3. I find no need for radiological assessment on  her at this point.   6/27/2023: Patient reviewed back today in office. She does not have any clinical findings suggestive of malignancy reoccurrence. She remains on Aromasin daily. Labs reviewed. CBC and CMP are both normal. CA 15-3 is normal at 18.5. Patient encouraged to become more active to help with weight gain and arthritis.   On 09/27/2023 the patient was further reviewed. She has no symptoms or signs of breast cancer recurrence. Her chest wall is unremarkable, the lymphedema in the left upper extremity is a grade 1 at the most controlled and she has no need for any elastic support. She has not developed any cellulitis or infection in the left upper extremity. The patient is taking Aromasin adjuvantly 100% compliance, no evidence of recurrent disease. Normal white count, hemoglobin and platelets pending chemistry profile. We advised her to remain on Aromasin 25 mg a day. She will be returning to see us back in 3 months with repeat CBC, CMP and CA 15-3. No need for radiological assessment on her at this point.    On 12/28/2023 no symptoms or signs of breast cancer recurrence. My conversation with, Danni Odell MD, today took place in regard to the potentiality for pleuritic pain on the right side. Lung auscultation is normal and the patient has no symptoms. This pain comes and goes very sporadically and is not bothering her on routine basis. Knowing that she has had a minimal pericardial alteration before we opted to proceed with a CT angiogram of the chest that will be done tomorrow and we will review this with the patient at some point on the telephone in the next 48 hours or so.     If we assume that her cancer marker CA 15-3 remains stable the patient will remain on Aromasin and we will review her radiological analysis again. Otherwise plan to review her back in 3 months with repeat CBC, CMP and CA 15-3.     On 01/18/2024, the patient was brought to the office to discuss the findings of the pathological  report of her retrosternal mass that documents carcinoma of the breast, metastatic, ER positive, ND positive, HER2 negative. Further analysis of this by Clara has been requested.     On this basis, I do believe that the patient has now officially metastatic breast cancer to different sites, specifically inside of the chest. I do not see any options in regard to surgical resection of this, neither radiation therapy because the heart is behind the tumoral site. Radiation therapy will be highly damaging to her heart, especially receiving anthracycline in the past. She had received Kisqali in the past with tremendous benefit and we will proceed with the management of this medicine at the dose of 200 mg twice a day. In anticipation of the use of this, the patient will have a magnesium supplement every day and some cashews every day and she will proceed with an EKG today to measure QT interval. This will be checked back in a couple of weeks.     The patient will require laboratory assessment every 2 weeks to monitor white count, hemoglobin and platelets in regard to Kisqali utilization.     I went ahead and requested a PET scan to be done as early as next week. The patient will have formal education and consent for Kisqali and the medicine will be delivered to her house to initiate this at some point probably in the middle of next week.     The patient will have the medication utilization at least every 6-8 weeks, repeat radiological assessment then and then decide how to proceed. We now know that her tumor marker is not useful to follow up on her disease process.    On 01/31/2024, the patient has not had any side effects of Kisqali administration along with Aromasin. Kisqali will continue at the present dose and the present schedule. We will continue rechecking her every couple of weeks and I will review her back in 4 weeks. Today her EKG shows atrial fibrillation with controlled ventricular response and a QT interval  that remains stable, unchanged from previous EKG. Formal request in regard to EKG interpretation has been done.    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 She has remained on her Aromasin and Kisqali and she is in the 2nd week of the 2nd batch of Kisqali at this time. She has not encountered any nausea or vomiting. She has not had any cardiac irregularity. She is constipated.     Her clinical examination is very benign. Her white count is low expected from the Kisqali with a normal hemoglobin and a normal platelet count. Her chemistry profile is profoundly abnormal, raises the question if now she has hepatitis induced by the Aromasin. 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 I went ahead and discontinued the Aromasin at this time and allowed her to continue the Kisqali until completion and she will require reassessment with laboratory testing on weekly basis from now on until we see what tendency her liver function test is going to go. If the numbers improve discontinuing the Aromasin the next step will be to deliver her therapy with Faslodex once a month, obviously with typical loading dose first. If the liver function test does not improve we will need to discontinue most of her medicines and reassess the status and figure out medicine by medicine one by one which is the one that is producing the hepatitis. Again the patient is not drinking any alcohol.    I went ahead and scheduled a CT scan of the chest to be done in 3 weeks from now and review with her in 4 weeks repeating CBC and CMP at that time. Her CA 15-3 has become useless in the follow up of her disease process. In the Next Generation Sequencing of the tissue documented from the chest biopsy unfortunately there is no other actionable mutation.     On 04/10/2024, the patient has been discontinued on Aromasin several weeks ago along with Kisqali 2 weeks ago given the presence of persistency of  hepatitis induced by medication. She is not drinking alcohol. She is not taking ibuprofen. She is not taking cholesterol medicine and she is not taking excessive amount of Tylenol. Today, the patient has no jaundice. The liver is not palpable. She has no tenderness in the right upper quadrant and the patient has had some mild improvement in her liver function test as posted above. This is good news. The liver functions were going in crescendo. Now they are going in decrescendo and that is again good news. I went ahead and repeated hepatitis serologies A, B and C. They remain negative. I requested antimitochondrial antibodies given the fact that the patient's mother has primary biliary cirrhosis. I also requested an AARON profile. In the meantime, obviously the real question is how to treat her metastatic breast cancer. I think we need to let her liver cool off before we move into any other intervention. I am planning to present her case in the lung cancer conference to see if any ideas in regard to removal of this tissue is available and also in the breast cancer conference in regard to any other ideas in regard to the treatment of her metastatic breast cancer with chemotherapy administration.     She will be informed about these conversations when they take place.    On 04/24/2024, as I discussed and left a note, in regard to the Multidisciplinary Lung Cancer Conference presentation and the Breast Cancer Conference presentation, the patient was suggested to be seen by Radiation Oncology and she has accomplished this today including the markings in preparation for palliative radiation therapy. This will be started as early as tomorrow or next week. 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     Obviously systemic therapy has been put on hold given the fact that she continues having chemical hepatitis. We have further modified medications including Eliquis thinking that this could also be a  culprit. Unfortunately, her liver function test remains abnormal. We have performed anti-mitochondrial antibodies, AARON, hepatitis A, B and C serologies and all this testing has returned being negative. I would not be surprised if we have to have the help of a GI team again. We will delay initiation of systemic therapy until her liver function returns to some normality.          2. Ovarian cyst  05/17/2021: Her CA 15-3 was minimal elevated in comparison to previous assessment and pelvic ultrasound ordered. t raises the following question.   05/24/2021: This is also specific in regard to the ovaries and actually an ultrasound of her vagina looking into the ovaries documented an unilocular cyst on the left side with typical features of benignity and a  was completely normal 5.5.   6/27/2023: CA 15-3 is normal at 18.5.  On 09/27/2023 no issues pertinent to this. This will remain in observation.    On 12/28/2023 this is asymptomatic. No issues pertinent to this at this time.     On 01/18/2024, no issues pertinent.    On 01/31/2024, her ovarian cyst has not changed radiologically in size. This will be left alone for the time being.    On 03/08/2024 ovarian cyst is not an issue at this time.    On 04/10/2024, the ovarian cyst is asymptomatic. We will leave this alone.      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   Her liver enzymes are completely normal today. She remains on Aromasin and obviously this patient never will be back on letrozole under any circumstances. This was documented through liver biopsy and through removal of the medication that triggered quick improvement in her liver function test.   On 09/14/2022 there is no evidence of drug induced hepatitis. Aromasin will be continued. The patient is aware that she never will be able to take Femara.   On 10/25/2022 her liver enzymes are completely normal today. Since discontinuation of Femara her drug induced hepatitis has resolved. This situation was  extremely rare.   ON 12/28/2022 no issues pertinent to liver function after discontinuation of Femara months ago.   On 03/29/2023 waiting to review her liver function. All of the abnormalities resolved after stopping letrozole.   6/27/2023: Liver enzymes remain normal.   No issues pertinent to this. She remains is observation.    On 12/28/2023 her liver function test is completely normal today. Aromasin is not producing any issues.    On 01/18/2024, no issues pertinent.    On 01/31/2024, no abnormalities in liver function test related to Femara because this medicine was discontinued time ago. Minimal alteration in liver function test related to Kisqali today with no implications.    On 03/08/2024 her liver function tests are very abnormal today. She is not drinking any alcohol, she is not taking any antiinflammatory medication or excessive Tylenol. My gut feeling is that it is going to be the Aromasin as the offender similar to what the Femara was the offender. If that is the case I will go ahead and stop Aromasin today and repeat laboratory testing on a weekly basis CMP until we see what the trend of her numbers will be. If that is not sufficient to make the liver function test to improve we will need to discontinue the Kisqali and keep working on discontinuation of medicines until we find the culprit.    Planning eventually to replace the Aromasin for Faslodex depending on the circumstances.     On 04/10/2024, the patient again has drug-induced hepatitis. Aromasin was discontinued. The liver abnormalities continue. Kisqali was discontinued. The liver function tests are finally improving today. The question was the combination of medicines or only 1 medicine and I do not know the answer. I have no way to prove it.    On 04/24/2024, as mentioned above, her chemistry hepatitis remains ongoing. Further testing has not produced any benefit in regard to finding the cause. Modifying medications has not produced any  benefit either. She will continue being monitored.      4. Septic Arthritis  The patient has developed an episode of septic arthritis in many joints including left shoulder and left ankle. She required antibiotic therapy as per recently. Infectious Disease was involved in this. In fact I discussed the case with them on the telephone. I suggested choosing the PICC line to be placed on the right arm in order to be able to deliver antibiotic therapy according to the proper indication. She completed the PICC line and it was removed last week with no complications or problems. The right upper extremity is completely normal. It is difficult for me to know why she developed the septic arthritis. She is in contact with horses all the time. The pathogen that she had in the joint is very uncommon in my opinion and the patient had no obvious source including no sinuses, no dental source, no obvious cardiac source, no gastrointestinal or genitourinary source and no skin source. It raises the question if this pathogen could evade to colonic source and I think I feel the obligation for this patient to have at least a CT scan of the abdomen and pelvis and eventually in several months from now consider a colonoscopy. Another consideration could be a Cologuard in some way. At least the patient’s infection seems to be under control. Her joints are still sore today including left shoulder, left ankle. Local inflammatory signs are very much gone. The only area of inflammation that she has in the 1st MP joint on the left hand probably represents osteoarthritis, no more than that. She will remain in observation from this point of view.  On 09/14/2022 the patient has no symptoms that would suggest any further spreading or new development of septic arthritis. My fear was related to the possible seeding of her hip replacement areas by bacteria during the bacteremia that she had not too long ago. It seems that that is not the case.  Radiologically speaking I do not see any local inflammation or reaction in any of these anatomical sites. There is perfect symmetry in the hip areas in regard to all her hardware material. Her sedimentation rate is BACK TO normal. Her white count is normal and this process will be watched in absence of any other intervention.  On 10/25/2022 no new episodes of septic arthritis. I measured her sedimentation rate during the previous visit that was down to 9, previously was in the 8-9 category. This means resolution of an inflammatory process altogether.   On 12/28/2022 at this time she is experiencing her typical symptom of osteoarthritis in her hands and hips but no issues pertinent to septic arthritis whatsoever. Deformities in the hands are ongoing due to osteoarthritis with no other issues or problems. No lymphedema in upper extremities.   On 03/29/2023 no significant sequela from her multiple foci and septic arthritis. What she has now is just typical osteoarthritis symptomatology and she uses Tylenol for this and occasional ibuprofen.   6/27/2023: Patient continues to report intermittent joint discomfort. Continues to manage pain with tylenol and occasional ibuprofen.   No issues pertinent to this.    On 12/28/2023 there is no evidence of recurrence of septic arthritis at this time.    On 01/18/2024, no issues pertinent.    On 01/31/2024, no septic arthritis symptomatology at this point.    On 03/08/2024 no evidence of septic arthritis but now she has a trigger finger middle on the left hand that is becoming a nuisance. She has the significant degenerative changes associated with osteoarthritis in both hands but I think if we correct this trigger finger she will be able to function better. Referral made to be seen by, Junior Delgado MD, Rheumatology.    On 04/10/2024, no evidence of septic arthritis.    On 04/24/2024, no issues in regard to septic arthritis at this time.        5. Atrial Fibrillation  The patient  developed atrial fibrillation with rapid ventricular response during the hospitalization with septic arthritis. Echocardiogram documented very dilated left atrium. She is now receiving Eliquis, metoprolol, and digoxin. Her ventricular response is controlled today but she remains in AFib. She has requested a followup with Dr. Odell and I went ahead and made her an appointment. 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 If she had a fall and she is taking anticoagulants the only thing that we are going to have is just trouble. She has sold one of the horses. She will sell the other horse very soon and she will remain on land for the time being. Her  is back in town and he is helping her with consecution of groceries and things of this nature under the present circumstances. I advised her to minimize any trauma.  On 09/14/2022 the patient will be seen by Electrophysiology tomorrow in regard to consideration of cardioversion for her AFib. She has discussed this with Dr. Odell and I have reviewed this data. That is perfectly fine from my point of view. I find no form of contraindication from my side of the story if this is the methodology that is chosen to try to revert her to normal sinus rhythm.  On 10/25/2022 the patient is in normal sinus rhythm today. She remains on anticoagulants. She has cardiology appointment tomorrow. I asked her to buy a pulse oximeter and I taught her how to interpret the little wave curve of the pulse oximeter in regard to her heart rate. Typically patients in A-fib have very irregular pulse chaotic and the little wave curves will be continuously changing and besides the pulse rate will be continuously changing depending on the speed of the process. That is very simple to interpret. If se develops that problem I advised her to call her cardiologist.   On 12/28/2022 today she is in normal sinus rhythm  by cardiac auscultation. Echocardiogram to be done by Danni Odell MD, in the next few days and further recommendations at that point. We strongly advised the patient about the continuation of Eliquis.   On 03/29/2023 clinically her heart obeys to normal sinus rhythm.   6/27/2023: Patient continues to be followed by Dr. Odell and has a stress test next week with Dr. Goodman. Patient remains on Eliquis with good tolerance.   On 09/27/2023 the patient has been in A-fib since the time that she was seen by, Danni Odell MD, at the time that she had cardiac stress testing. Her A-fib is under control today with medications in regard to rate but she raised the question when, Manjeet Gustafson MD, is going to see her. I do not know that question but I will go ahead and request an appointment and send him a note.     On 12/28/2023 the patient is back into atrial fibrillation. Dr. Danni Odell, is controlling ventricular response and the patient is on Eliquis with no embolic phenomenon.     On 01/18/2024, no new issues pertinent to this. She continues being monitored by Dr. Odell.     On 01/31/2024, her atrial fibrillation remains controlled. Her QT interval is still applicable and normal. QT interval has not been modified by Kisqali administration. She remains on magnesium supplement.    On 03/08/2024 she remains in atrial fibrillation clinically with controlled ventricular response, no congestive heart failure, no angina. She remains on anticoagulant. No clinical bleeding.    On 04/10/2024, she remains in atrial fibrillation with controlled ventricular response. She remains on metoprolol at this time and Eliquis.    On 04/24/2024, I took the risk last week to discontinue Eliquis because in the package insert, this medicine also can produce hepatitis. Unfortunately, her liver function test has not improved today. My expectation is that maybe in the next couple of weeks things will improve. We will continue monitoring this. The patient is  aware that in atrial fibrillation, she could have an embolic stroke. Hopefully that will not be the case. She will remain on aspirin.      6. Grade 3 lymphedema in the left upper extremity    I went and made an urgent referral to the Lymphedema Clinic today to see if further bandage and drainage of this and massage would be helpful to her in the long run to control the problem. I still advised her to be extremely careful in regard to any injury or lesions in the skin of the left upper extremity to minimize any potentiality of cellulitis. In the long run if she has any episodes she will need to be back on antibiotics right away.   On 09/14/2022 the patient's lymphedema in the left upper extremity continues. She already has an appointment to be seen by the lymphedema clinic, she will require a new sleeve and massage and interventional therapy for this. She is aware that she needs to avoid any trauma in the left upper extremity to minimize any cellulitis. I strongly believe as posted before that septic arthritis is part of her lymphedema issues.   On 10/25/2022 her lymphedema in the left upper extremity is very much resolved with the sleeve and all of the manipulation that she has had in the lymphedema clinic. I advised her to be very prudent in regard doing any nicking in the skin and damaging the skin pertinent to the left upper extremity to minimize cellulitis and potentiality for further problems.   On 12/28/2022 no lymphedema in either extremity. No need for sleeve use at this time.   On 03/29/2023 the patient has no leftover lymphedema in either extremity.  6/27/2023: Stable.   Lymphedema in the left upper extremity on 09/27/2023 is grade 1 and has not required any sleeve usage or elastic bandage at this time.     On 12/28/2023 her lymphedema in the left upper extremity is very minimal grade 1 at this point. No secondary.    On 01/18/2024, probably a grade 1 lymphedema in the left upper extremity.    On  01/31/2024, her lymphedema in the left upper extremity is gone.    On 03/08/2024 no lymphedema in either extremity at this time.    On 04/10/2024, no evidence of lymphedema in the left upper extremity at this time.    On 04/24/2024, no lymphedema in the left upper extremity.        7. Hypertension  In regard to hypertension management I have controlled this before. Now she is taking quite amount of metoprolol. I will give up the management of this and now that she will see Dr. Odell, they will provide the care of this issue. I would not be surprised if the patient in the long run needs to be receiving another blood pressure medication given the fact that her blood pressure is still marginally elevated today. Taking digoxin, I do not think Lasix or hydrochlorothiazide will be a good choice because of the potential for hypokalemia unless the potassium is replaced and monitored very close. This will probably minimize the potentiality for digoxin toxicity.  On 09/14/2022 her blood pressure is controlled with the metoprolol that is provided by the cardiology team. She was advised again to avoid antiinflammatory medication for pain control.   On 10/25/2022 her blood pressure is under good control and I advised her to continue taking her blood pressure medication and once more I advised against the use of any antiinflammatory medication by oral route.   On 12/28/2022 blood pressure under good control, medications will remain the same.  On 03/29/2023 her blood pressure remains under excellent control.   6/27/2023: Continues to be followed by Cardiology.   On 09/27/2023 her blood pressure is under good control.    On 12/28/2023 her blood pressure has been spiking including to a 190 systolic today in the office of, Danni Odell MD. Amlodipine was given. Her blood pressure today here is going down. The patient is eating exaggerated amount of salt and on top of that she is taking antiinflammatory nonsteroidal medications at least 6  times a week, all of this in conjunction with raised blood pressure. I told her one more time she cannot take antiinflammatory medication, she can only take Tylenol. Dr. Danni Odell, will be adjusting her blood pressure medicine accordingly.     On 01/18/2024, proper blood pressure control at this time.    On 01/31/2024, her blood pressure is in excellent range today, 124/87.    On 03/08/2024 her blood pressure is under good control. Her blood pressure medication will remain the same.     On 04/10/2024, blood pressure under good control at this time.    On 04/24/2024, excellent control of her blood pressure with amlodipine and metoprolol.      9. Insomnia  The problem is what to provide her for this problem at this time. There are cardiac issues. I do believe that this patient will be better off at least for the next 6 weeks taking a benzodiazapine or something of this nature more than any other medication. I do not feel compelled to give her gabapentin that actually has not worked for her in the past. Therefore I went ahead and prescribed for her medication in this regard today to take it at bedtime to see if this allows her to sleep properly.   On 10/25/2022 the patient persists having insomnia not sleeping more than 3 hours taking Ambien. I went ahead and discontinued this medicine. I gave her Seroquel 25 mg tablets to take 1 orally nightly. I asked her to call my nurse Priya Gonzalez next week and let us know how she is doing in that arena.   6/27/2023: Patient is no longer on Seroquel. Insomnia is mildly improved.     On 09/27/2023 the patient is no longer requiring any insomnia medication.    On 12/28/2023 not requiring any insomnia medication.     On 01/18/2024, not requiring any medicine for this at this time.    On 01/31/2024, requiring Unisom for insomnia.    On 03/08/2024 Unisom will continue to be used for insomnia.    On 04/10/2024, Unisom remains ongoing for insomnia.    On 04/24/2024, Unisom utilized for  nighttime insomnia.      Plan:  On 04/24/2024, the patient has been advised as posted above to continue monitoring liver function test on weekly basis. She will not be initiated on any systemic therapy for her breast cancer until she completes radiation therapy in the next couple of weeks. Fortunately, she is not symptomatic from her metastasis, neither symptomatic from her chemical hepatitis.     I discussed all these facts with the patient in detail today.

## 2024-05-01 ENCOUNTER — HOSPITAL ENCOUNTER (OUTPATIENT)
Dept: RADIATION ONCOLOGY | Facility: HOSPITAL | Age: 64
Setting detail: RADIATION/ONCOLOGY SERIES
End: 2024-05-01
Payer: MEDICARE

## 2024-05-01 ENCOUNTER — TELEPHONE (OUTPATIENT)
Dept: ONCOLOGY | Facility: CLINIC | Age: 64
End: 2024-05-01
Payer: MEDICARE

## 2024-05-01 ENCOUNTER — APPOINTMENT (OUTPATIENT)
Dept: RADIATION ONCOLOGY | Facility: HOSPITAL | Age: 64
End: 2024-05-01
Payer: MEDICARE

## 2024-05-01 ENCOUNTER — LAB (OUTPATIENT)
Dept: LAB | Facility: HOSPITAL | Age: 64
End: 2024-05-01
Payer: MEDICARE

## 2024-05-01 DIAGNOSIS — K75.9 HEPATITIS: Primary | ICD-10-CM

## 2024-05-01 LAB
ALBUMIN SERPL-MCNC: 4.6 G/DL (ref 3.5–5.2)
ALBUMIN/GLOB SERPL: 1.6 G/DL
ALP SERPL-CCNC: 145 U/L (ref 39–117)
ALT SERPL W P-5'-P-CCNC: 916 U/L (ref 1–33)
ANION GAP SERPL CALCULATED.3IONS-SCNC: 10.3 MMOL/L (ref 5–15)
AST SERPL-CCNC: 512 U/L (ref 1–32)
BILIRUB SERPL-MCNC: 0.7 MG/DL (ref 0–1.2)
BUN SERPL-MCNC: 15 MG/DL (ref 8–23)
BUN/CREAT SERPL: 16 (ref 7–25)
CALCIUM SPEC-SCNC: 10.2 MG/DL (ref 8.6–10.5)
CHLORIDE SERPL-SCNC: 102 MMOL/L (ref 98–107)
CO2 SERPL-SCNC: 25.7 MMOL/L (ref 22–29)
CREAT SERPL-MCNC: 0.94 MG/DL (ref 0.57–1)
EGFRCR SERPLBLD CKD-EPI 2021: 67.1 ML/MIN/1.73
GLOBULIN UR ELPH-MCNC: 2.9 GM/DL
GLUCOSE SERPL-MCNC: 108 MG/DL (ref 65–99)
POTASSIUM SERPL-SCNC: 4.7 MMOL/L (ref 3.5–5.2)
PROT SERPL-MCNC: 7.5 G/DL (ref 6–8.5)
SODIUM SERPL-SCNC: 138 MMOL/L (ref 136–145)

## 2024-05-01 PROCEDURE — 36415 COLL VENOUS BLD VENIPUNCTURE: CPT

## 2024-05-01 PROCEDURE — 80053 COMPREHEN METABOLIC PANEL: CPT

## 2024-05-01 RX ORDER — PREDNISONE 10 MG/1
30 TABLET ORAL DAILY
Qty: 90 TABLET | Refills: 0 | Status: SHIPPED | OUTPATIENT
Start: 2024-05-01 | End: 2024-05-31

## 2024-05-01 NOTE — TELEPHONE ENCOUNTER
Called Dr. Dixon to discuss liver enzymes and relayed message to patient that she should start prednisone 30mg/daily and we will recheck her labs again next week. Pt v/u. Ekta Gonzalez RN

## 2024-05-02 ENCOUNTER — APPOINTMENT (OUTPATIENT)
Dept: RADIATION ONCOLOGY | Facility: HOSPITAL | Age: 64
End: 2024-05-02
Payer: MEDICARE

## 2024-05-03 ENCOUNTER — APPOINTMENT (OUTPATIENT)
Dept: RADIATION ONCOLOGY | Facility: HOSPITAL | Age: 64
End: 2024-05-03
Payer: MEDICARE

## 2024-05-06 LAB
RAD ONC ARIA COURSE END DATE: NORMAL
RAD ONC ARIA COURSE END DATE: NORMAL
RAD ONC ARIA COURSE ID: NORMAL
RAD ONC ARIA COURSE ID: NORMAL
RAD ONC ARIA COURSE INTENT: NORMAL
RAD ONC ARIA COURSE INTENT: NORMAL
RAD ONC ARIA COURSE LAST TREATMENT DATE: NORMAL
RAD ONC ARIA COURSE LAST TREATMENT DATE: NORMAL
RAD ONC ARIA COURSE START DATE: NORMAL
RAD ONC ARIA COURSE START DATE: NORMAL
RAD ONC ARIA COURSE TREATMENT ELAPSED DAYS: 10
RAD ONC ARIA COURSE TREATMENT ELAPSED DAYS: 74
RAD ONC ARIA FIRST TREATMENT DATE: NORMAL
RAD ONC ARIA FIRST TREATMENT DATE: NORMAL
RAD ONC ARIA PLAN FRACTIONS TREATED TO DATE: 2
RAD ONC ARIA PLAN FRACTIONS TREATED TO DATE: 2
RAD ONC ARIA PLAN FRACTIONS TREATED TO DATE: 25
RAD ONC ARIA PLAN FRACTIONS TREATED TO DATE: 3
RAD ONC ARIA PLAN FRACTIONS TREATED TO DATE: 5
RAD ONC ARIA PLAN FRACTIONS TREATED TO DATE: 5
RAD ONC ARIA PLAN FRACTIONS TREATED TO DATE: 7
RAD ONC ARIA PLAN ID: NORMAL
RAD ONC ARIA PLAN NAME: NORMAL
RAD ONC ARIA PLAN PRESCRIBED DOSE PER FRACTION: 2 GY
RAD ONC ARIA PLAN PRESCRIBED DOSE PER FRACTION: 2.02 GY
RAD ONC ARIA PLAN PRESCRIBED DOSE PER FRACTION: 2.18 GY
RAD ONC ARIA PLAN PRESCRIBED DOSE PER FRACTION: 3 GY
RAD ONC ARIA PLAN PRESCRIBED DOSE PER FRACTION: 3 GY
RAD ONC ARIA PLAN PRIMARY REFERENCE POINT: NORMAL
RAD ONC ARIA PLAN TOTAL FRACTIONS PRESCRIBED: 2
RAD ONC ARIA PLAN TOTAL FRACTIONS PRESCRIBED: 2
RAD ONC ARIA PLAN TOTAL FRACTIONS PRESCRIBED: 25
RAD ONC ARIA PLAN TOTAL FRACTIONS PRESCRIBED: 3
RAD ONC ARIA PLAN TOTAL FRACTIONS PRESCRIBED: 5
RAD ONC ARIA PLAN TOTAL FRACTIONS PRESCRIBED: 5
RAD ONC ARIA PLAN TOTAL FRACTIONS PRESCRIBED: 7
RAD ONC ARIA PLAN TOTAL PRESCRIBED DOSE: 1000 CGY
RAD ONC ARIA PLAN TOTAL PRESCRIBED DOSE: 1000 CGY
RAD ONC ARIA PLAN TOTAL PRESCRIBED DOSE: 2100 CGY
RAD ONC ARIA PLAN TOTAL PRESCRIBED DOSE: 436 CGY
RAD ONC ARIA PLAN TOTAL PRESCRIBED DOSE: 5000 CGY
RAD ONC ARIA PLAN TOTAL PRESCRIBED DOSE: 600 CGY
RAD ONC ARIA PLAN TOTAL PRESCRIBED DOSE: 606 CGY
RAD ONC ARIA REFERENCE POINT DOSAGE GIVEN TO DATE: 10 GY
RAD ONC ARIA REFERENCE POINT DOSAGE GIVEN TO DATE: 26.5 GY
RAD ONC ARIA REFERENCE POINT DOSAGE GIVEN TO DATE: 27 GY
RAD ONC ARIA REFERENCE POINT DOSAGE GIVEN TO DATE: 3.87 GY
RAD ONC ARIA REFERENCE POINT DOSAGE GIVEN TO DATE: 4.36 GY
RAD ONC ARIA REFERENCE POINT DOSAGE GIVEN TO DATE: 5.21 GY
RAD ONC ARIA REFERENCE POINT DOSAGE GIVEN TO DATE: 50 GY
RAD ONC ARIA REFERENCE POINT DOSAGE GIVEN TO DATE: 54.36 GY
RAD ONC ARIA REFERENCE POINT DOSAGE GIVEN TO DATE: 56.06 GY
RAD ONC ARIA REFERENCE POINT DOSAGE GIVEN TO DATE: 6.06 GY
RAD ONC ARIA REFERENCE POINT ID: NORMAL

## 2024-05-07 ENCOUNTER — HOSPITAL ENCOUNTER (OUTPATIENT)
Dept: RADIATION ONCOLOGY | Facility: HOSPITAL | Age: 64
Discharge: HOME | End: 2024-05-07

## 2024-05-07 ENCOUNTER — APPOINTMENT (OUTPATIENT)
Dept: RADIATION ONCOLOGY | Facility: HOSPITAL | Age: 64
End: 2024-05-07
Payer: MEDICARE

## 2024-05-07 ENCOUNTER — RADIATION ONCOLOGY WEEKLY ASSESSMENT (OUTPATIENT)
Dept: RADIATION ONCOLOGY | Facility: HOSPITAL | Age: 64
End: 2024-05-07
Payer: MEDICARE

## 2024-05-07 ENCOUNTER — LAB (OUTPATIENT)
Dept: LAB | Facility: HOSPITAL | Age: 64
End: 2024-05-07
Payer: MEDICARE

## 2024-05-07 ENCOUNTER — OFFICE VISIT (OUTPATIENT)
Dept: ONCOLOGY | Facility: CLINIC | Age: 64
End: 2024-05-07
Payer: MEDICARE

## 2024-05-07 VITALS
DIASTOLIC BLOOD PRESSURE: 93 MMHG | HEIGHT: 65 IN | TEMPERATURE: 96.3 F | SYSTOLIC BLOOD PRESSURE: 133 MMHG | OXYGEN SATURATION: 95 % | HEART RATE: 91 BPM | BODY MASS INDEX: 30.32 KG/M2 | WEIGHT: 182 LBS | RESPIRATION RATE: 16 BRPM

## 2024-05-07 VITALS
OXYGEN SATURATION: 91 % | HEART RATE: 79 BPM | BODY MASS INDEX: 30.29 KG/M2 | SYSTOLIC BLOOD PRESSURE: 139 MMHG | DIASTOLIC BLOOD PRESSURE: 92 MMHG | WEIGHT: 182 LBS

## 2024-05-07 DIAGNOSIS — Z17.0 MALIGNANT NEOPLASM OF OVERLAPPING SITES OF LEFT BREAST IN FEMALE, ESTROGEN RECEPTOR POSITIVE: ICD-10-CM

## 2024-05-07 DIAGNOSIS — Z17.0 MALIGNANT NEOPLASM OF OVERLAPPING SITES OF LEFT BREAST IN FEMALE, ESTROGEN RECEPTOR POSITIVE: Primary | ICD-10-CM

## 2024-05-07 DIAGNOSIS — C50.812 MALIGNANT NEOPLASM OF OVERLAPPING SITES OF LEFT BREAST IN FEMALE, ESTROGEN RECEPTOR POSITIVE: Primary | ICD-10-CM

## 2024-05-07 DIAGNOSIS — C50.812 MALIGNANT NEOPLASM OF OVERLAPPING SITES OF LEFT BREAST IN FEMALE, ESTROGEN RECEPTOR POSITIVE: ICD-10-CM

## 2024-05-07 DIAGNOSIS — T50.905A DRUG-INDUCED HEPATITIS: ICD-10-CM

## 2024-05-07 DIAGNOSIS — I10 PRIMARY HYPERTENSION: ICD-10-CM

## 2024-05-07 DIAGNOSIS — K71.6 DRUG-INDUCED HEPATITIS: ICD-10-CM

## 2024-05-07 LAB
ALBUMIN SERPL-MCNC: 4.3 G/DL (ref 3.5–5.2)
ALBUMIN/GLOB SERPL: 1.5 G/DL
ALP SERPL-CCNC: 110 U/L (ref 39–117)
ALT SERPL W P-5'-P-CCNC: 332 U/L (ref 1–33)
ANION GAP SERPL CALCULATED.3IONS-SCNC: 12.4 MMOL/L (ref 5–15)
AST SERPL-CCNC: 97 U/L (ref 1–32)
BASOPHILS # BLD AUTO: 0.05 10*3/MM3 (ref 0–0.2)
BASOPHILS NFR BLD AUTO: 0.7 % (ref 0–1.5)
BILIRUB SERPL-MCNC: 0.5 MG/DL (ref 0–1.2)
BUN SERPL-MCNC: 16 MG/DL (ref 8–23)
BUN/CREAT SERPL: 18.6 (ref 7–25)
CALCIUM SPEC-SCNC: 9.4 MG/DL (ref 8.6–10.5)
CHLORIDE SERPL-SCNC: 103 MMOL/L (ref 98–107)
CO2 SERPL-SCNC: 22.6 MMOL/L (ref 22–29)
CREAT SERPL-MCNC: 0.86 MG/DL (ref 0.57–1)
DEPRECATED RDW RBC AUTO: 57.7 FL (ref 37–54)
EGFRCR SERPLBLD CKD-EPI 2021: 74.6 ML/MIN/1.73
EOSINOPHIL # BLD AUTO: 0.02 10*3/MM3 (ref 0–0.4)
EOSINOPHIL NFR BLD AUTO: 0.3 % (ref 0.3–6.2)
ERYTHROCYTE [DISTWIDTH] IN BLOOD BY AUTOMATED COUNT: 15.9 % (ref 12.3–15.4)
GLOBULIN UR ELPH-MCNC: 2.8 GM/DL
GLUCOSE SERPL-MCNC: 140 MG/DL (ref 65–99)
HCT VFR BLD AUTO: 43 % (ref 34–46.6)
HGB BLD-MCNC: 13.9 G/DL (ref 12–15.9)
IMM GRANULOCYTES # BLD AUTO: 0.03 10*3/MM3 (ref 0–0.05)
IMM GRANULOCYTES NFR BLD AUTO: 0.4 % (ref 0–0.5)
LYMPHOCYTES # BLD AUTO: 0.92 10*3/MM3 (ref 0.7–3.1)
LYMPHOCYTES NFR BLD AUTO: 13 % (ref 19.6–45.3)
MCH RBC QN AUTO: 31.7 PG (ref 26.6–33)
MCHC RBC AUTO-ENTMCNC: 32.3 G/DL (ref 31.5–35.7)
MCV RBC AUTO: 97.9 FL (ref 79–97)
MONOCYTES # BLD AUTO: 0.17 10*3/MM3 (ref 0.1–0.9)
MONOCYTES NFR BLD AUTO: 2.4 % (ref 5–12)
NEUTROPHILS NFR BLD AUTO: 5.87 10*3/MM3 (ref 1.7–7)
NEUTROPHILS NFR BLD AUTO: 83.2 % (ref 42.7–76)
NRBC BLD AUTO-RTO: 0 /100 WBC (ref 0–0.2)
PLATELET # BLD AUTO: 213 10*3/MM3 (ref 140–450)
PMV BLD AUTO: 8.8 FL (ref 6–12)
POTASSIUM SERPL-SCNC: 4 MMOL/L (ref 3.5–5.2)
PROT SERPL-MCNC: 7.1 G/DL (ref 6–8.5)
RAD ONC ARIA COURSE ID: NORMAL
RAD ONC ARIA COURSE INTENT: NORMAL
RAD ONC ARIA COURSE LAST TREATMENT DATE: NORMAL
RAD ONC ARIA COURSE START DATE: NORMAL
RAD ONC ARIA COURSE TREATMENT ELAPSED DAYS: 0
RAD ONC ARIA FIRST TREATMENT DATE: NORMAL
RAD ONC ARIA PLAN FRACTIONS TREATED TO DATE: 1
RAD ONC ARIA PLAN ID: NORMAL
RAD ONC ARIA PLAN PRESCRIBED DOSE PER FRACTION: 3 GY
RAD ONC ARIA PLAN PRIMARY REFERENCE POINT: NORMAL
RAD ONC ARIA PLAN TOTAL FRACTIONS PRESCRIBED: 10
RAD ONC ARIA PLAN TOTAL PRESCRIBED DOSE: 3000 CGY
RAD ONC ARIA REFERENCE POINT DOSAGE GIVEN TO DATE: 3 GY
RAD ONC ARIA REFERENCE POINT ID: NORMAL
RAD ONC ARIA REFERENCE POINT SESSION DOSAGE GIVEN: 3 GY
RBC # BLD AUTO: 4.39 10*6/MM3 (ref 3.77–5.28)
SODIUM SERPL-SCNC: 138 MMOL/L (ref 136–145)
WBC NRBC COR # BLD AUTO: 7.06 10*3/MM3 (ref 3.4–10.8)

## 2024-05-07 PROCEDURE — 77386: CPT | Performed by: RADIOLOGY

## 2024-05-07 PROCEDURE — 99214 OFFICE O/P EST MOD 30 MIN: CPT | Performed by: INTERNAL MEDICINE

## 2024-05-07 PROCEDURE — 3080F DIAST BP >= 90 MM HG: CPT | Performed by: INTERNAL MEDICINE

## 2024-05-07 PROCEDURE — 3075F SYST BP GE 130 - 139MM HG: CPT | Performed by: INTERNAL MEDICINE

## 2024-05-07 PROCEDURE — 80053 COMPREHEN METABOLIC PANEL: CPT

## 2024-05-07 PROCEDURE — 77014 CHG CT GUIDANCE RADIATION THERAPY FLDS PLACEMENT: CPT | Performed by: RADIOLOGY

## 2024-05-07 PROCEDURE — 85025 COMPLETE CBC W/AUTO DIFF WBC: CPT

## 2024-05-07 PROCEDURE — 77427 RADIATION TX MANAGEMENT X5: CPT | Performed by: RADIOLOGY

## 2024-05-07 PROCEDURE — 1126F AMNT PAIN NOTED NONE PRSNT: CPT | Performed by: INTERNAL MEDICINE

## 2024-05-07 RX ORDER — APIXABAN 5 MG/1
TABLET, FILM COATED ORAL
COMMUNITY
Start: 2024-05-05 | End: 2024-05-07

## 2024-05-07 RX ORDER — EXEMESTANE 25 MG/1
TABLET ORAL
COMMUNITY
Start: 2024-05-05

## 2024-05-08 ENCOUNTER — HOSPITAL ENCOUNTER (OUTPATIENT)
Dept: RADIATION ONCOLOGY | Facility: HOSPITAL | Age: 64
Setting detail: RADIATION/ONCOLOGY SERIES
Discharge: HOME | End: 2024-05-08
Payer: MEDICARE

## 2024-05-08 LAB
RAD ONC ARIA COURSE ID: NORMAL
RAD ONC ARIA COURSE INTENT: NORMAL
RAD ONC ARIA COURSE LAST TREATMENT DATE: NORMAL
RAD ONC ARIA COURSE START DATE: NORMAL
RAD ONC ARIA COURSE TREATMENT ELAPSED DAYS: 1
RAD ONC ARIA FIRST TREATMENT DATE: NORMAL
RAD ONC ARIA PLAN FRACTIONS TREATED TO DATE: 2
RAD ONC ARIA PLAN ID: NORMAL
RAD ONC ARIA PLAN PRESCRIBED DOSE PER FRACTION: 3 GY
RAD ONC ARIA PLAN PRIMARY REFERENCE POINT: NORMAL
RAD ONC ARIA PLAN TOTAL FRACTIONS PRESCRIBED: 10
RAD ONC ARIA PLAN TOTAL PRESCRIBED DOSE: 3000 CGY
RAD ONC ARIA REFERENCE POINT DOSAGE GIVEN TO DATE: 6 GY
RAD ONC ARIA REFERENCE POINT ID: NORMAL
RAD ONC ARIA REFERENCE POINT SESSION DOSAGE GIVEN: 3 GY

## 2024-05-08 PROCEDURE — 77386: CPT | Performed by: RADIOLOGY

## 2024-05-08 PROCEDURE — 77014 CHG CT GUIDANCE RADIATION THERAPY FLDS PLACEMENT: CPT | Performed by: RADIOLOGY

## 2024-05-09 ENCOUNTER — HOSPITAL ENCOUNTER (OUTPATIENT)
Dept: RADIATION ONCOLOGY | Facility: HOSPITAL | Age: 64
Setting detail: RADIATION/ONCOLOGY SERIES
Discharge: HOME | End: 2024-05-09
Payer: MEDICARE

## 2024-05-09 LAB
RAD ONC ARIA COURSE ID: NORMAL
RAD ONC ARIA COURSE INTENT: NORMAL
RAD ONC ARIA COURSE LAST TREATMENT DATE: NORMAL
RAD ONC ARIA COURSE START DATE: NORMAL
RAD ONC ARIA COURSE TREATMENT ELAPSED DAYS: 2
RAD ONC ARIA FIRST TREATMENT DATE: NORMAL
RAD ONC ARIA PLAN FRACTIONS TREATED TO DATE: 3
RAD ONC ARIA PLAN ID: NORMAL
RAD ONC ARIA PLAN PRESCRIBED DOSE PER FRACTION: 3 GY
RAD ONC ARIA PLAN PRIMARY REFERENCE POINT: NORMAL
RAD ONC ARIA PLAN TOTAL FRACTIONS PRESCRIBED: 10
RAD ONC ARIA PLAN TOTAL PRESCRIBED DOSE: 3000 CGY
RAD ONC ARIA REFERENCE POINT DOSAGE GIVEN TO DATE: 9 GY
RAD ONC ARIA REFERENCE POINT ID: NORMAL
RAD ONC ARIA REFERENCE POINT SESSION DOSAGE GIVEN: 3 GY

## 2024-05-09 PROCEDURE — 77386: CPT | Performed by: RADIOLOGY

## 2024-05-09 PROCEDURE — 77014 CHG CT GUIDANCE RADIATION THERAPY FLDS PLACEMENT: CPT | Performed by: RADIOLOGY

## 2024-05-09 PROCEDURE — 77336 RADIATION PHYSICS CONSULT: CPT | Performed by: RADIOLOGY

## 2024-05-10 ENCOUNTER — HOSPITAL ENCOUNTER (OUTPATIENT)
Dept: RADIATION ONCOLOGY | Facility: HOSPITAL | Age: 64
Setting detail: RADIATION/ONCOLOGY SERIES
Discharge: HOME | End: 2024-05-10
Payer: MEDICARE

## 2024-05-10 LAB
RAD ONC ARIA COURSE ID: NORMAL
RAD ONC ARIA COURSE INTENT: NORMAL
RAD ONC ARIA COURSE LAST TREATMENT DATE: NORMAL
RAD ONC ARIA COURSE START DATE: NORMAL
RAD ONC ARIA COURSE TREATMENT ELAPSED DAYS: 3
RAD ONC ARIA FIRST TREATMENT DATE: NORMAL
RAD ONC ARIA PLAN FRACTIONS TREATED TO DATE: 4
RAD ONC ARIA PLAN ID: NORMAL
RAD ONC ARIA PLAN PRESCRIBED DOSE PER FRACTION: 3 GY
RAD ONC ARIA PLAN PRIMARY REFERENCE POINT: NORMAL
RAD ONC ARIA PLAN TOTAL FRACTIONS PRESCRIBED: 10
RAD ONC ARIA PLAN TOTAL PRESCRIBED DOSE: 3000 CGY
RAD ONC ARIA REFERENCE POINT DOSAGE GIVEN TO DATE: 12 GY
RAD ONC ARIA REFERENCE POINT ID: NORMAL
RAD ONC ARIA REFERENCE POINT SESSION DOSAGE GIVEN: 3 GY

## 2024-05-10 PROCEDURE — 77014 CHG CT GUIDANCE RADIATION THERAPY FLDS PLACEMENT: CPT | Performed by: RADIOLOGY

## 2024-05-10 PROCEDURE — 77386: CPT | Performed by: RADIOLOGY

## 2024-05-13 ENCOUNTER — HOSPITAL ENCOUNTER (OUTPATIENT)
Dept: RADIATION ONCOLOGY | Facility: HOSPITAL | Age: 64
Setting detail: RADIATION/ONCOLOGY SERIES
Discharge: HOME | End: 2024-05-13
Payer: MEDICARE

## 2024-05-13 LAB
RAD ONC ARIA COURSE ID: NORMAL
RAD ONC ARIA COURSE INTENT: NORMAL
RAD ONC ARIA COURSE LAST TREATMENT DATE: NORMAL
RAD ONC ARIA COURSE START DATE: NORMAL
RAD ONC ARIA COURSE TREATMENT ELAPSED DAYS: 6
RAD ONC ARIA FIRST TREATMENT DATE: NORMAL
RAD ONC ARIA PLAN FRACTIONS TREATED TO DATE: 5
RAD ONC ARIA PLAN ID: NORMAL
RAD ONC ARIA PLAN PRESCRIBED DOSE PER FRACTION: 3 GY
RAD ONC ARIA PLAN PRIMARY REFERENCE POINT: NORMAL
RAD ONC ARIA PLAN TOTAL FRACTIONS PRESCRIBED: 10
RAD ONC ARIA PLAN TOTAL PRESCRIBED DOSE: 3000 CGY
RAD ONC ARIA REFERENCE POINT DOSAGE GIVEN TO DATE: 15 GY
RAD ONC ARIA REFERENCE POINT ID: NORMAL
RAD ONC ARIA REFERENCE POINT SESSION DOSAGE GIVEN: 3 GY

## 2024-05-13 PROCEDURE — 77014 CHG CT GUIDANCE RADIATION THERAPY FLDS PLACEMENT: CPT | Performed by: RADIOLOGY

## 2024-05-13 PROCEDURE — 77386: CPT | Performed by: RADIOLOGY

## 2024-05-14 ENCOUNTER — HOSPITAL ENCOUNTER (OUTPATIENT)
Dept: RADIATION ONCOLOGY | Facility: HOSPITAL | Age: 64
Setting detail: RADIATION/ONCOLOGY SERIES
Discharge: HOME | End: 2024-05-14
Payer: MEDICARE

## 2024-05-14 ENCOUNTER — CLINICAL SUPPORT (OUTPATIENT)
Dept: ONCOLOGY | Facility: HOSPITAL | Age: 64
End: 2024-05-14
Payer: MEDICARE

## 2024-05-14 ENCOUNTER — SPECIALTY PHARMACY (OUTPATIENT)
Dept: PHARMACY | Facility: HOSPITAL | Age: 64
End: 2024-05-14
Payer: MEDICARE

## 2024-05-14 ENCOUNTER — RADIATION ONCOLOGY WEEKLY ASSESSMENT (OUTPATIENT)
Dept: RADIATION ONCOLOGY | Facility: HOSPITAL | Age: 64
End: 2024-05-14
Payer: MEDICARE

## 2024-05-14 ENCOUNTER — LAB (OUTPATIENT)
Dept: LAB | Facility: HOSPITAL | Age: 64
End: 2024-05-14
Payer: MEDICARE

## 2024-05-14 VITALS — OXYGEN SATURATION: 98 % | SYSTOLIC BLOOD PRESSURE: 122 MMHG | DIASTOLIC BLOOD PRESSURE: 90 MMHG | HEART RATE: 83 BPM

## 2024-05-14 DIAGNOSIS — C50.812 MALIGNANT NEOPLASM OF OVERLAPPING SITES OF LEFT BREAST IN FEMALE, ESTROGEN RECEPTOR POSITIVE: ICD-10-CM

## 2024-05-14 DIAGNOSIS — K71.6 DRUG-INDUCED HEPATITIS: ICD-10-CM

## 2024-05-14 DIAGNOSIS — Z17.0 MALIGNANT NEOPLASM OF OVERLAPPING SITES OF LEFT BREAST IN FEMALE, ESTROGEN RECEPTOR POSITIVE: ICD-10-CM

## 2024-05-14 DIAGNOSIS — C50.812 MALIGNANT NEOPLASM OF OVERLAPPING SITES OF LEFT BREAST IN FEMALE, ESTROGEN RECEPTOR POSITIVE: Primary | ICD-10-CM

## 2024-05-14 DIAGNOSIS — Z17.0 MALIGNANT NEOPLASM OF OVERLAPPING SITES OF LEFT BREAST IN FEMALE, ESTROGEN RECEPTOR POSITIVE: Primary | ICD-10-CM

## 2024-05-14 DIAGNOSIS — T50.905A DRUG-INDUCED HEPATITIS: ICD-10-CM

## 2024-05-14 DIAGNOSIS — I10 PRIMARY HYPERTENSION: ICD-10-CM

## 2024-05-14 LAB
ALBUMIN SERPL-MCNC: 4.3 G/DL (ref 3.5–5.2)
ALBUMIN/GLOB SERPL: 1.5 G/DL
ALP SERPL-CCNC: 96 U/L (ref 39–117)
ALT SERPL W P-5'-P-CCNC: 167 U/L (ref 1–33)
ANION GAP SERPL CALCULATED.3IONS-SCNC: 13.5 MMOL/L (ref 5–15)
AST SERPL-CCNC: 76 U/L (ref 1–32)
BASOPHILS # BLD AUTO: 0.04 10*3/MM3 (ref 0–0.2)
BASOPHILS NFR BLD AUTO: 0.6 % (ref 0–1.5)
BILIRUB SERPL-MCNC: 0.5 MG/DL (ref 0–1.2)
BUN SERPL-MCNC: 19 MG/DL (ref 8–23)
BUN/CREAT SERPL: 20.4 (ref 7–25)
CALCIUM SPEC-SCNC: 9.6 MG/DL (ref 8.6–10.5)
CHLORIDE SERPL-SCNC: 100 MMOL/L (ref 98–107)
CO2 SERPL-SCNC: 24.5 MMOL/L (ref 22–29)
CREAT SERPL-MCNC: 0.93 MG/DL (ref 0.57–1)
DEPRECATED RDW RBC AUTO: 56.9 FL (ref 37–54)
EGFRCR SERPLBLD CKD-EPI 2021: 67.9 ML/MIN/1.73
EOSINOPHIL # BLD AUTO: 0.02 10*3/MM3 (ref 0–0.4)
EOSINOPHIL NFR BLD AUTO: 0.3 % (ref 0.3–6.2)
ERYTHROCYTE [DISTWIDTH] IN BLOOD BY AUTOMATED COUNT: 15.4 % (ref 12.3–15.4)
GLOBULIN UR ELPH-MCNC: 2.8 GM/DL
GLUCOSE SERPL-MCNC: 119 MG/DL (ref 65–99)
HCT VFR BLD AUTO: 46.3 % (ref 34–46.6)
HGB BLD-MCNC: 14.8 G/DL (ref 12–15.9)
IMM GRANULOCYTES # BLD AUTO: 0.04 10*3/MM3 (ref 0–0.05)
IMM GRANULOCYTES NFR BLD AUTO: 0.6 % (ref 0–0.5)
LYMPHOCYTES # BLD AUTO: 0.71 10*3/MM3 (ref 0.7–3.1)
LYMPHOCYTES NFR BLD AUTO: 10.4 % (ref 19.6–45.3)
MCH RBC QN AUTO: 31.8 PG (ref 26.6–33)
MCHC RBC AUTO-ENTMCNC: 32 G/DL (ref 31.5–35.7)
MCV RBC AUTO: 99.6 FL (ref 79–97)
MONOCYTES # BLD AUTO: 0.3 10*3/MM3 (ref 0.1–0.9)
MONOCYTES NFR BLD AUTO: 4.4 % (ref 5–12)
NEUTROPHILS NFR BLD AUTO: 5.7 10*3/MM3 (ref 1.7–7)
NEUTROPHILS NFR BLD AUTO: 83.7 % (ref 42.7–76)
NRBC BLD AUTO-RTO: 0 /100 WBC (ref 0–0.2)
PLATELET # BLD AUTO: 178 10*3/MM3 (ref 140–450)
PMV BLD AUTO: 8.6 FL (ref 6–12)
POTASSIUM SERPL-SCNC: 4.8 MMOL/L (ref 3.5–5.2)
PROT SERPL-MCNC: 7.1 G/DL (ref 6–8.5)
RAD ONC ARIA COURSE ID: NORMAL
RAD ONC ARIA COURSE INTENT: NORMAL
RAD ONC ARIA COURSE LAST TREATMENT DATE: NORMAL
RAD ONC ARIA COURSE START DATE: NORMAL
RAD ONC ARIA COURSE TREATMENT ELAPSED DAYS: 7
RAD ONC ARIA FIRST TREATMENT DATE: NORMAL
RAD ONC ARIA PLAN FRACTIONS TREATED TO DATE: 6
RAD ONC ARIA PLAN ID: NORMAL
RAD ONC ARIA PLAN PRESCRIBED DOSE PER FRACTION: 3 GY
RAD ONC ARIA PLAN PRIMARY REFERENCE POINT: NORMAL
RAD ONC ARIA PLAN TOTAL FRACTIONS PRESCRIBED: 10
RAD ONC ARIA PLAN TOTAL PRESCRIBED DOSE: 3000 CGY
RAD ONC ARIA REFERENCE POINT DOSAGE GIVEN TO DATE: 18 GY
RAD ONC ARIA REFERENCE POINT ID: NORMAL
RAD ONC ARIA REFERENCE POINT SESSION DOSAGE GIVEN: 3 GY
RBC # BLD AUTO: 4.65 10*6/MM3 (ref 3.77–5.28)
SODIUM SERPL-SCNC: 138 MMOL/L (ref 136–145)
WBC NRBC COR # BLD AUTO: 6.81 10*3/MM3 (ref 3.4–10.8)

## 2024-05-14 PROCEDURE — 77014 CHG CT GUIDANCE RADIATION THERAPY FLDS PLACEMENT: CPT | Performed by: RADIOLOGY

## 2024-05-14 PROCEDURE — 36415 COLL VENOUS BLD VENIPUNCTURE: CPT

## 2024-05-14 PROCEDURE — 77427 RADIATION TX MANAGEMENT X5: CPT | Performed by: RADIOLOGY

## 2024-05-14 PROCEDURE — 77386: CPT | Performed by: RADIOLOGY

## 2024-05-14 PROCEDURE — 80053 COMPREHEN METABOLIC PANEL: CPT

## 2024-05-14 PROCEDURE — 85025 COMPLETE CBC W/AUTO DIFF WBC: CPT

## 2024-05-14 NOTE — PROGRESS NOTES
Patient is here for lab review with RN.  CBC reviewed, counts are stable for this patient at this time. Patient has no complaints. Copy of labs given to patient and follow up appointment reviewed. Patient is instructed to call the office with any concerns or new symptoms prior to next visit. Patient verbalized understanding and discharged in stable condition. Will call patient will remainder of lab results. Pt v/u. Ekta Gonzalez RN

## 2024-05-14 NOTE — PROGRESS NOTES
Physician Weekly Management Note    Diagnosis:     Diagnosis Plan   1. Malignant neoplasm of overlapping sites of left breast in female, estrogen receptor positive            RT Details:  fx 6/10 pericardial fat mass 18/30Gy    Notes on Treatment course, Films, Medical progress:  Kps 90% doing well, no pain, no erythema , cont on     Weekly Management:  Medication reviewed?   Yes  New medications given?   No  Problemlist reviewed?   Yes  Problem added?   No  Issues raised requiring referral to support services - task assigned to:  brionna    Technical aspects reviewed:  Weekly OBI approved?   Yes  Weekly port films approved?   Yes  Change requests noted on port film?   No  Patient setup and plan reviewed?   Yes    Chart Reviewed:  Continue current treatment plan?   Yes  Treatment plan change requested?   No  CBC reviewed?   No  Concurrent Chemo?   No    Objective     Toxicities:   none      Review of Systems   Constitutional: Negative.    Skin: Negative.         There were no vitals filed for this visit.        5/7/2024    11:28 AM   Current Status   ECOG score 0       Physical Exam  Constitutional:       Appearance: Normal appearance.   Neurological:      Mental Status: She is alert.           Problem Summary List    Diagnosis:     Diagnosis Plan   1. Malignant neoplasm of overlapping sites of left breast in female, estrogen receptor positive          Pathology:   metastatic breast cancer     Past Medical History:   Diagnosis Date   • Anemia in neoplastic disease    • Arthritis    • Arthritis of back    • Arthritis of neck    • Breast cancer     Left   • CVA (cerebral vascular accident)    • Encounter for long-term (current) use of other medications 06/22/2021   • Fracture, fibula    • Fracture, finger    • Frozen shoulder    • Hip arthrosis 01/17   • Hip pain     RIGHT HIP... CYST   • History of fracture of leg 1987   • History of radiation therapy     LAST TREATMENT     • Hypertension    • Knee swelling    •  Limited joint range of motion (ROM)     RIGHT HIP   • Low back strain    • Periarthritis of shoulder    • Rotator cuff syndrome 6/22   • Skin sore     OPEN SORE LEFT BREAST   • Syncope    • Vertigo          Past Surgical History:   Procedure Laterality Date   • AXILLARY LYMPH NODE BIOPSY/EXCISION Right     LYMPH NODE UNDER RIGHT ARM-MALIGNANT (DOUBLE MASTECTOMY)   • BREAST BIOPSY Left     MALIGNANT   • FRACTURE SURGERY  1987    Leg   • HARDWARE REMOVAL     • INCISION AND DRAINAGE LEG Left 06/30/2022    Procedure: INCISION AND DRAINAGE left ankle;  Surgeon: Kenneth Tran MD;  Location: [facility];  Service: Orthopedics;  Laterality: Left;   • JOINT REPLACEMENT Bilateral mar 2020,july 2021    hips   • MASTECTOMY W/ SENTINEL NODE BIOPSY Bilateral 09/16/2019    Procedure: BILATERLA MODIFIED RADICAL MASTECTOMY WITH BILATERAL SENTINEL LYMPH NODE BIOPSY;  Surgeon: Joby Barron Jr., MD;  Location: [facility];  Service: General   • TOTAL HIP ARTHROPLASTY Right 03/02/2020    Procedure: RIGHT TOTAL HIP ARTHROPLASTY NATALY NAVIGATION;  Surgeon: Luis M Leonard MD;  Location: [facility];  Service: Orthopedics;  Laterality: Right;   • TOTAL HIP ARTHROPLASTY Left 07/20/2021    Procedure: Posterior LEFT TOTAL HIP ARTHROPLASTY NATALY NAVIGATION;  Surgeon: Luis M Leonard MD;  Location: [facility] OR;  Service: Orthopedics;  Laterality: Left;   • VENOUS ACCESS DEVICE (PORT) INSERTION Right 02/01/2019    Procedure: INSERTION VENOUS ACCESS DEVICE;  Surgeon: Joby Barron Jr., MD;  Location: [facility];  Service: General   • VENOUS ACCESS DEVICE (PORT) REMOVAL N/A 10/30/2019    Procedure: Mediport Removal;  Surgeon: Joby Barron Jr., MD;  Location: [facility];  Service: General         Current Outpatient Medications on File Prior to Visit   Medication Sig Dispense Refill   • amLODIPine (NORVASC) 2.5 MG tablet Take 1 tablet by mouth Daily. 30 tablet 11   • doxylamine (UNISOM) 25 MG tablet Take 1 tablet  by mouth At Night As Needed for Sleep.     • exemestane (AROMASIN) 25 MG tablet      • metoprolol succinate XL (TOPROL-XL) 50 MG 24 hr tablet Take 2 tablets po q am and one tablet po q pm 270 tablet 3   • polyethylene glycol 17 g packet Take 17 g by mouth 2 (Two) Times a Day As Needed (constipation).     • predniSONE (DELTASONE) 10 MG tablet Take 3 tablets by mouth Daily for 30 days. 90 tablet 0   • tiZANidine (ZANAFLEX) 4 MG tablet Take 1 tablet by mouth Every 6 (Six) Hours As Needed for Muscle Spasms.     • [DISCONTINUED] digoxin (LANOXIN) 125 MCG tablet Take 1 tablet by mouth Daily.       Current Facility-Administered Medications on File Prior to Visit   Medication Dose Route Frequency Provider Last Rate Last Admin   • Chlorhexidine Gluconate Cloth 2 % pads 1 each  1 each Apply externally Take As Directed Luis M Leonard MD           Allergies   Allergen Reactions   • Benadryl [Diphenhydramine] Itching     INCREASES BLOOD PRESSURE   • Erythromycin GI Intolerance   • Levaquin [Levofloxacin] GI Intolerance   • Penicillins GI Intolerance         Referring Provider:    Elie Dixon Md  [address] [place] [address]    Oncologist:  No primary care provider on file.      Seen and approved by:  Denise Joyner MD  05/14/2024

## 2024-05-14 NOTE — PROGRESS NOTES
I have been following pt for restart of Kisqali. No indication that she has restarted the medication.     I have disenrolled her from the SpRx Program as she is not currently on oral treatment.    Pt can be re-enrolled if she has to be restarted on treatment.     Estelle Emmanuel, Pharmacy Technician  Specialty Pharmacy Technician

## 2024-05-15 ENCOUNTER — TELEPHONE (OUTPATIENT)
Dept: ONCOLOGY | Facility: CLINIC | Age: 64
End: 2024-05-15
Payer: MEDICARE

## 2024-05-15 ENCOUNTER — HOSPITAL ENCOUNTER (OUTPATIENT)
Dept: RADIATION ONCOLOGY | Facility: HOSPITAL | Age: 64
Setting detail: RADIATION/ONCOLOGY SERIES
Discharge: HOME | End: 2024-05-15
Payer: MEDICARE

## 2024-05-15 LAB
RAD ONC ARIA COURSE ID: NORMAL
RAD ONC ARIA COURSE INTENT: NORMAL
RAD ONC ARIA COURSE LAST TREATMENT DATE: NORMAL
RAD ONC ARIA COURSE START DATE: NORMAL
RAD ONC ARIA COURSE TREATMENT ELAPSED DAYS: 8
RAD ONC ARIA FIRST TREATMENT DATE: NORMAL
RAD ONC ARIA PLAN FRACTIONS TREATED TO DATE: 7
RAD ONC ARIA PLAN ID: NORMAL
RAD ONC ARIA PLAN PRESCRIBED DOSE PER FRACTION: 3 GY
RAD ONC ARIA PLAN PRIMARY REFERENCE POINT: NORMAL
RAD ONC ARIA PLAN TOTAL FRACTIONS PRESCRIBED: 10
RAD ONC ARIA PLAN TOTAL PRESCRIBED DOSE: 3000 CGY
RAD ONC ARIA REFERENCE POINT DOSAGE GIVEN TO DATE: 21 GY
RAD ONC ARIA REFERENCE POINT ID: NORMAL
RAD ONC ARIA REFERENCE POINT SESSION DOSAGE GIVEN: 3 GY

## 2024-05-15 PROCEDURE — 77386: CPT | Performed by: RADIOLOGY

## 2024-05-15 PROCEDURE — 77014 CHG CT GUIDANCE RADIATION THERAPY FLDS PLACEMENT: CPT | Performed by: RADIOLOGY

## 2024-05-16 ENCOUNTER — APPOINTMENT (OUTPATIENT)
Dept: RADIATION ONCOLOGY | Facility: HOSPITAL | Age: 64
End: 2024-05-16
Payer: MEDICARE

## 2024-05-17 ENCOUNTER — HOSPITAL ENCOUNTER (OUTPATIENT)
Dept: RADIATION ONCOLOGY | Facility: HOSPITAL | Age: 64
Setting detail: RADIATION/ONCOLOGY SERIES
Discharge: HOME | End: 2024-05-17
Payer: MEDICARE

## 2024-05-17 LAB
RAD ONC ARIA COURSE ID: NORMAL
RAD ONC ARIA COURSE INTENT: NORMAL
RAD ONC ARIA COURSE LAST TREATMENT DATE: NORMAL
RAD ONC ARIA COURSE START DATE: NORMAL
RAD ONC ARIA COURSE TREATMENT ELAPSED DAYS: 10
RAD ONC ARIA FIRST TREATMENT DATE: NORMAL
RAD ONC ARIA PLAN FRACTIONS TREATED TO DATE: 8
RAD ONC ARIA PLAN ID: NORMAL
RAD ONC ARIA PLAN PRESCRIBED DOSE PER FRACTION: 3 GY
RAD ONC ARIA PLAN PRIMARY REFERENCE POINT: NORMAL
RAD ONC ARIA PLAN TOTAL FRACTIONS PRESCRIBED: 10
RAD ONC ARIA PLAN TOTAL PRESCRIBED DOSE: 3000 CGY
RAD ONC ARIA REFERENCE POINT DOSAGE GIVEN TO DATE: 24 GY
RAD ONC ARIA REFERENCE POINT ID: NORMAL
RAD ONC ARIA REFERENCE POINT SESSION DOSAGE GIVEN: 3 GY

## 2024-05-17 PROCEDURE — 77336 RADIATION PHYSICS CONSULT: CPT | Performed by: RADIOLOGY

## 2024-05-17 PROCEDURE — 77386: CPT | Performed by: RADIOLOGY

## 2024-05-17 PROCEDURE — 77014 CHG CT GUIDANCE RADIATION THERAPY FLDS PLACEMENT: CPT | Performed by: RADIOLOGY

## 2024-05-20 ENCOUNTER — HOSPITAL ENCOUNTER (OUTPATIENT)
Dept: RADIATION ONCOLOGY | Facility: HOSPITAL | Age: 64
Setting detail: RADIATION/ONCOLOGY SERIES
Discharge: HOME | End: 2024-05-20
Payer: MEDICARE

## 2024-05-20 ENCOUNTER — APPOINTMENT (OUTPATIENT)
Dept: RADIATION ONCOLOGY | Facility: HOSPITAL | Age: 64
End: 2024-05-20
Payer: MEDICARE

## 2024-05-20 LAB
RAD ONC ARIA COURSE ID: NORMAL
RAD ONC ARIA COURSE INTENT: NORMAL
RAD ONC ARIA COURSE LAST TREATMENT DATE: NORMAL
RAD ONC ARIA COURSE START DATE: NORMAL
RAD ONC ARIA COURSE TREATMENT ELAPSED DAYS: 13
RAD ONC ARIA FIRST TREATMENT DATE: NORMAL
RAD ONC ARIA PLAN FRACTIONS TREATED TO DATE: 9
RAD ONC ARIA PLAN ID: NORMAL
RAD ONC ARIA PLAN PRESCRIBED DOSE PER FRACTION: 3 GY
RAD ONC ARIA PLAN PRIMARY REFERENCE POINT: NORMAL
RAD ONC ARIA PLAN TOTAL FRACTIONS PRESCRIBED: 10
RAD ONC ARIA PLAN TOTAL PRESCRIBED DOSE: 3000 CGY
RAD ONC ARIA REFERENCE POINT DOSAGE GIVEN TO DATE: 27 GY
RAD ONC ARIA REFERENCE POINT ID: NORMAL
RAD ONC ARIA REFERENCE POINT SESSION DOSAGE GIVEN: 3 GY

## 2024-05-20 PROCEDURE — 77014 CHG CT GUIDANCE RADIATION THERAPY FLDS PLACEMENT: CPT | Performed by: RADIOLOGY

## 2024-05-20 PROCEDURE — 77386: CPT | Performed by: RADIOLOGY

## 2024-05-21 ENCOUNTER — LAB (OUTPATIENT)
Dept: LAB | Facility: HOSPITAL | Age: 64
End: 2024-05-21
Payer: MEDICARE

## 2024-05-21 ENCOUNTER — TELEPHONE (OUTPATIENT)
Dept: ONCOLOGY | Facility: CLINIC | Age: 64
End: 2024-05-21
Payer: MEDICARE

## 2024-05-21 ENCOUNTER — APPOINTMENT (OUTPATIENT)
Dept: ONCOLOGY | Facility: HOSPITAL | Age: 64
End: 2024-05-21
Payer: MEDICARE

## 2024-05-21 ENCOUNTER — RADIATION ONCOLOGY WEEKLY ASSESSMENT (OUTPATIENT)
Dept: RADIATION ONCOLOGY | Facility: HOSPITAL | Age: 64
End: 2024-05-21
Payer: MEDICARE

## 2024-05-21 ENCOUNTER — HOSPITAL ENCOUNTER (OUTPATIENT)
Dept: RADIATION ONCOLOGY | Facility: HOSPITAL | Age: 64
Setting detail: RADIATION/ONCOLOGY SERIES
Discharge: HOME | End: 2024-05-21
Payer: MEDICARE

## 2024-05-21 VITALS
OXYGEN SATURATION: 98 % | WEIGHT: 180 LBS | HEART RATE: 76 BPM | SYSTOLIC BLOOD PRESSURE: 113 MMHG | DIASTOLIC BLOOD PRESSURE: 84 MMHG | BODY MASS INDEX: 29.95 KG/M2

## 2024-05-21 DIAGNOSIS — Z17.0 MALIGNANT NEOPLASM OF OVERLAPPING SITES OF LEFT BREAST IN FEMALE, ESTROGEN RECEPTOR POSITIVE: ICD-10-CM

## 2024-05-21 DIAGNOSIS — C50.812 MALIGNANT NEOPLASM OF OVERLAPPING SITES OF LEFT BREAST IN FEMALE, ESTROGEN RECEPTOR POSITIVE: ICD-10-CM

## 2024-05-21 DIAGNOSIS — I10 PRIMARY HYPERTENSION: ICD-10-CM

## 2024-05-21 DIAGNOSIS — K71.6 DRUG-INDUCED HEPATITIS: ICD-10-CM

## 2024-05-21 DIAGNOSIS — T50.905A DRUG-INDUCED HEPATITIS: ICD-10-CM

## 2024-05-21 DIAGNOSIS — Z17.0 MALIGNANT NEOPLASM OF OVERLAPPING SITES OF LEFT BREAST IN FEMALE, ESTROGEN RECEPTOR POSITIVE: Primary | ICD-10-CM

## 2024-05-21 DIAGNOSIS — C50.812 MALIGNANT NEOPLASM OF OVERLAPPING SITES OF LEFT BREAST IN FEMALE, ESTROGEN RECEPTOR POSITIVE: Primary | ICD-10-CM

## 2024-05-21 LAB
ALBUMIN SERPL-MCNC: 4.3 G/DL (ref 3.5–5.2)
ALBUMIN/GLOB SERPL: 1.7 G/DL
ALP SERPL-CCNC: 89 U/L (ref 39–117)
ALT SERPL W P-5'-P-CCNC: 138 U/L (ref 1–33)
ANION GAP SERPL CALCULATED.3IONS-SCNC: 12 MMOL/L (ref 5–15)
AST SERPL-CCNC: 66 U/L (ref 1–32)
BASOPHILS # BLD AUTO: 0.05 10*3/MM3 (ref 0–0.2)
BASOPHILS NFR BLD AUTO: 0.8 % (ref 0–1.5)
BILIRUB SERPL-MCNC: 0.6 MG/DL (ref 0–1.2)
BUN SERPL-MCNC: 15 MG/DL (ref 8–23)
BUN/CREAT SERPL: 16.3 (ref 7–25)
CALCIUM SPEC-SCNC: 9.4 MG/DL (ref 8.6–10.5)
CHLORIDE SERPL-SCNC: 103 MMOL/L (ref 98–107)
CO2 SERPL-SCNC: 25 MMOL/L (ref 22–29)
CREAT SERPL-MCNC: 0.92 MG/DL (ref 0.57–1)
DEPRECATED RDW RBC AUTO: 54.7 FL (ref 37–54)
EGFRCR SERPLBLD CKD-EPI 2021: 68.8 ML/MIN/1.73
EOSINOPHIL # BLD AUTO: 0.03 10*3/MM3 (ref 0–0.4)
EOSINOPHIL NFR BLD AUTO: 0.5 % (ref 0.3–6.2)
ERYTHROCYTE [DISTWIDTH] IN BLOOD BY AUTOMATED COUNT: 15.1 % (ref 12.3–15.4)
GLOBULIN UR ELPH-MCNC: 2.6 GM/DL
GLUCOSE SERPL-MCNC: 114 MG/DL (ref 65–99)
HCT VFR BLD AUTO: 44.1 % (ref 34–46.6)
HGB BLD-MCNC: 14.3 G/DL (ref 12–15.9)
IMM GRANULOCYTES # BLD AUTO: 0.01 10*3/MM3 (ref 0–0.05)
IMM GRANULOCYTES NFR BLD AUTO: 0.2 % (ref 0–0.5)
LYMPHOCYTES # BLD AUTO: 0.71 10*3/MM3 (ref 0.7–3.1)
LYMPHOCYTES NFR BLD AUTO: 11.6 % (ref 19.6–45.3)
MCH RBC QN AUTO: 32.1 PG (ref 26.6–33)
MCHC RBC AUTO-ENTMCNC: 32.4 G/DL (ref 31.5–35.7)
MCV RBC AUTO: 98.9 FL (ref 79–97)
MONOCYTES # BLD AUTO: 0.39 10*3/MM3 (ref 0.1–0.9)
MONOCYTES NFR BLD AUTO: 6.4 % (ref 5–12)
NEUTROPHILS NFR BLD AUTO: 4.95 10*3/MM3 (ref 1.7–7)
NEUTROPHILS NFR BLD AUTO: 80.5 % (ref 42.7–76)
NRBC BLD AUTO-RTO: 0 /100 WBC (ref 0–0.2)
PLATELET # BLD AUTO: 179 10*3/MM3 (ref 140–450)
PMV BLD AUTO: 8.8 FL (ref 6–12)
POTASSIUM SERPL-SCNC: 4.2 MMOL/L (ref 3.5–5.2)
PROT SERPL-MCNC: 6.9 G/DL (ref 6–8.5)
RAD ONC ARIA COURSE ID: NORMAL
RAD ONC ARIA COURSE INTENT: NORMAL
RAD ONC ARIA COURSE LAST TREATMENT DATE: NORMAL
RAD ONC ARIA COURSE START DATE: NORMAL
RAD ONC ARIA COURSE TREATMENT ELAPSED DAYS: 14
RAD ONC ARIA FIRST TREATMENT DATE: NORMAL
RAD ONC ARIA PLAN FRACTIONS TREATED TO DATE: 10
RAD ONC ARIA PLAN ID: NORMAL
RAD ONC ARIA PLAN PRESCRIBED DOSE PER FRACTION: 3 GY
RAD ONC ARIA PLAN PRIMARY REFERENCE POINT: NORMAL
RAD ONC ARIA PLAN TOTAL FRACTIONS PRESCRIBED: 10
RAD ONC ARIA PLAN TOTAL PRESCRIBED DOSE: 3000 CGY
RAD ONC ARIA REFERENCE POINT DOSAGE GIVEN TO DATE: 30 GY
RAD ONC ARIA REFERENCE POINT ID: NORMAL
RAD ONC ARIA REFERENCE POINT SESSION DOSAGE GIVEN: 3 GY
RBC # BLD AUTO: 4.46 10*6/MM3 (ref 3.77–5.28)
SODIUM SERPL-SCNC: 140 MMOL/L (ref 136–145)
WBC NRBC COR # BLD AUTO: 6.14 10*3/MM3 (ref 3.4–10.8)

## 2024-05-21 PROCEDURE — 80053 COMPREHEN METABOLIC PANEL: CPT

## 2024-05-21 PROCEDURE — 85025 COMPLETE CBC W/AUTO DIFF WBC: CPT

## 2024-05-21 PROCEDURE — 36415 COLL VENOUS BLD VENIPUNCTURE: CPT

## 2024-05-21 PROCEDURE — 77014 CHG CT GUIDANCE RADIATION THERAPY FLDS PLACEMENT: CPT | Performed by: RADIOLOGY

## 2024-05-21 PROCEDURE — 77386: CPT | Performed by: RADIOLOGY

## 2024-05-21 NOTE — TELEPHONE ENCOUNTER
Called patient and relayed lab results. Left detailed voicemail with call back number [phone] should she have any questions. Ekta Gonzalez RN

## 2024-05-21 NOTE — PROGRESS NOTES
Radiation Oncology  On-Treatment Note      Patient: Marylu Georges    MRN: [id number]    Attending Physician: Denise Joyner MD     Diagnosis:     ICD-10-CM ICD-9-CM   1. Malignant neoplasm of overlapping sites of left breast in female, estrogen receptor positive  C50.812 174.8    Z17.0 V86.0       Radiation Therapy Visit:  Continue radiation therapy, Dosimetry plan remains acceptable, Films reviewed and remains acceptable, Pain assessed, Pain management planned, Radiation dose schedule reviewed and remains acceptable, Radiation technique remains acceptable, and Symptoms within expected range    Radiation Treatments       Active   Reference Points   Rx: Paracardiac   Most recent treatment: Dose given: 300 cGy (on 5/21/2024)   Total: Dose given: 3,000 cGy   Elapsed Days: 14                      Physical Examination:  Vitals: Blood pressure 113/84, pulse 76, weight 81.6 kg (180 lb), SpO2 98%, not currently breastfeeding.  Pain Score    05/21/24 1017   PainSc: 0-No pain       Fully active, able to carry on all pre-disease performance without restriction = 0    We examined the relevant areas: yes  Findings are within the expected range for this stage of treatment: yes  -------------------------------------------------------------------------------------------------------------------    ACTION ITEMS:  Patient tolerating treatment well and as expected for this stage in their treatment    Estimated Completion Date: today may 21, 2024      Denise Joyner MD  Radiation Oncology

## 2024-05-28 LAB
RAD ONC ARIA COURSE END DATE: NORMAL
RAD ONC ARIA COURSE ID: NORMAL
RAD ONC ARIA COURSE INTENT: NORMAL
RAD ONC ARIA COURSE LAST TREATMENT DATE: NORMAL
RAD ONC ARIA COURSE START DATE: NORMAL
RAD ONC ARIA COURSE TREATMENT ELAPSED DAYS: 14
RAD ONC ARIA FIRST TREATMENT DATE: NORMAL
RAD ONC ARIA PLAN FRACTIONS TREATED TO DATE: 10
RAD ONC ARIA PLAN ID: NORMAL
RAD ONC ARIA PLAN NAME: NORMAL
RAD ONC ARIA PLAN PRESCRIBED DOSE PER FRACTION: 3 GY
RAD ONC ARIA PLAN PRIMARY REFERENCE POINT: NORMAL
RAD ONC ARIA PLAN TOTAL FRACTIONS PRESCRIBED: 10
RAD ONC ARIA PLAN TOTAL PRESCRIBED DOSE: 3000 CGY
RAD ONC ARIA REFERENCE POINT DOSAGE GIVEN TO DATE: 30 GY
RAD ONC ARIA REFERENCE POINT ID: NORMAL

## 2024-05-29 ENCOUNTER — HOSPITAL ENCOUNTER (EMERGENCY)
Facility: HOSPITAL | Age: 64
Discharge: HOME | End: 2024-05-29
Attending: EMERGENCY MEDICINE
Payer: MEDICARE

## 2024-05-29 ENCOUNTER — TELEPHONE (OUTPATIENT)
Dept: ONCOLOGY | Facility: CLINIC | Age: 64
End: 2024-05-29

## 2024-05-29 ENCOUNTER — APPOINTMENT (OUTPATIENT)
Dept: CT IMAGING | Facility: HOSPITAL | Age: 64
End: 2024-05-29
Payer: MEDICARE

## 2024-05-29 VITALS
TEMPERATURE: 97.3 F | HEART RATE: 105 BPM | HEIGHT: 65 IN | WEIGHT: 180 LBS | BODY MASS INDEX: 29.99 KG/M2 | DIASTOLIC BLOOD PRESSURE: 110 MMHG | OXYGEN SATURATION: 92 % | RESPIRATION RATE: 17 BRPM | SYSTOLIC BLOOD PRESSURE: 133 MMHG

## 2024-05-29 DIAGNOSIS — I10 HYPERTENSION, UNSPECIFIED TYPE: Primary | ICD-10-CM

## 2024-05-29 DIAGNOSIS — R51.9 NONINTRACTABLE HEADACHE, UNSPECIFIED CHRONICITY PATTERN, UNSPECIFIED HEADACHE TYPE: ICD-10-CM

## 2024-05-29 DIAGNOSIS — Z85.3 HISTORY OF BREAST CANCER: ICD-10-CM

## 2024-05-29 DIAGNOSIS — R11.2 NAUSEA AND VOMITING, UNSPECIFIED VOMITING TYPE: ICD-10-CM

## 2024-05-29 LAB
ALBUMIN SERPL-MCNC: 4.5 G/DL (ref 3.5–5.2)
ALBUMIN/GLOB SERPL: 2 G/DL
ALP SERPL-CCNC: 89 U/L (ref 39–117)
ALT SERPL W P-5'-P-CCNC: 147 U/L (ref 1–33)
ANION GAP SERPL CALCULATED.3IONS-SCNC: 12.7 MMOL/L (ref 5–15)
AST SERPL-CCNC: 77 U/L (ref 1–32)
BASOPHILS # BLD AUTO: 0.03 10*3/MM3 (ref 0–0.2)
BASOPHILS NFR BLD AUTO: 0.4 % (ref 0–1.5)
BILIRUB SERPL-MCNC: 0.7 MG/DL (ref 0–1.2)
BUN SERPL-MCNC: 18 MG/DL (ref 8–23)
BUN/CREAT SERPL: 22.8 (ref 7–25)
CALCIUM SPEC-SCNC: 9.6 MG/DL (ref 8.6–10.5)
CHLORIDE SERPL-SCNC: 101 MMOL/L (ref 98–107)
CO2 SERPL-SCNC: 24.3 MMOL/L (ref 22–29)
CREAT SERPL-MCNC: 0.79 MG/DL (ref 0.57–1)
D-LACTATE SERPL-SCNC: 1.7 MMOL/L (ref 0.5–2)
DEPRECATED RDW RBC AUTO: 49.3 FL (ref 37–54)
EGFRCR SERPLBLD CKD-EPI 2021: 82.6 ML/MIN/1.73
EOSINOPHIL # BLD AUTO: 0.02 10*3/MM3 (ref 0–0.4)
EOSINOPHIL NFR BLD AUTO: 0.3 % (ref 0.3–6.2)
ERYTHROCYTE [DISTWIDTH] IN BLOOD BY AUTOMATED COUNT: 13.9 % (ref 12.3–15.4)
GLOBULIN UR ELPH-MCNC: 2.3 GM/DL
GLUCOSE SERPL-MCNC: 113 MG/DL (ref 65–99)
HCT VFR BLD AUTO: 45.1 % (ref 34–46.6)
HGB BLD-MCNC: 15.1 G/DL (ref 12–15.9)
IMM GRANULOCYTES # BLD AUTO: 0.01 10*3/MM3 (ref 0–0.05)
IMM GRANULOCYTES NFR BLD AUTO: 0.1 % (ref 0–0.5)
LIPASE SERPL-CCNC: 24 U/L (ref 13–60)
LYMPHOCYTES # BLD AUTO: 0.42 10*3/MM3 (ref 0.7–3.1)
LYMPHOCYTES NFR BLD AUTO: 6.1 % (ref 19.6–45.3)
MCH RBC QN AUTO: 32.3 PG (ref 26.6–33)
MCHC RBC AUTO-ENTMCNC: 33.5 G/DL (ref 31.5–35.7)
MCV RBC AUTO: 96.4 FL (ref 79–97)
MONOCYTES # BLD AUTO: 0.33 10*3/MM3 (ref 0.1–0.9)
MONOCYTES NFR BLD AUTO: 4.8 % (ref 5–12)
NEUTROPHILS NFR BLD AUTO: 6.12 10*3/MM3 (ref 1.7–7)
NEUTROPHILS NFR BLD AUTO: 88.3 % (ref 42.7–76)
NRBC BLD AUTO-RTO: 0 /100 WBC (ref 0–0.2)
PLATELET # BLD AUTO: 165 10*3/MM3 (ref 140–450)
PMV BLD AUTO: 8.9 FL (ref 6–12)
POTASSIUM SERPL-SCNC: 4.3 MMOL/L (ref 3.5–5.2)
PROT SERPL-MCNC: 6.8 G/DL (ref 6–8.5)
RBC # BLD AUTO: 4.68 10*6/MM3 (ref 3.77–5.28)
SODIUM SERPL-SCNC: 138 MMOL/L (ref 136–145)
WBC NRBC COR # BLD AUTO: 6.93 10*3/MM3 (ref 3.4–10.8)

## 2024-05-29 PROCEDURE — 80053 COMPREHEN METABOLIC PANEL: CPT | Performed by: EMERGENCY MEDICINE

## 2024-05-29 PROCEDURE — 96374 THER/PROPH/DIAG INJ IV PUSH: CPT

## 2024-05-29 PROCEDURE — 25010000002 LABETALOL 5 MG/ML SOLUTION: Performed by: EMERGENCY MEDICINE

## 2024-05-29 PROCEDURE — 25810000003 SODIUM CHLORIDE 0.9 % SOLUTION: Performed by: EMERGENCY MEDICINE

## 2024-05-29 PROCEDURE — 85025 COMPLETE CBC W/AUTO DIFF WBC: CPT | Performed by: EMERGENCY MEDICINE

## 2024-05-29 PROCEDURE — 70450 CT HEAD/BRAIN W/O DYE: CPT

## 2024-05-29 PROCEDURE — 83690 ASSAY OF LIPASE: CPT | Performed by: EMERGENCY MEDICINE

## 2024-05-29 PROCEDURE — 99284 EMERGENCY DEPT VISIT MOD MDM: CPT

## 2024-05-29 PROCEDURE — 83605 ASSAY OF LACTIC ACID: CPT | Performed by: EMERGENCY MEDICINE

## 2024-05-29 PROCEDURE — 25010000002 ONDANSETRON PER 1 MG: Performed by: EMERGENCY MEDICINE

## 2024-05-29 PROCEDURE — 96375 TX/PRO/DX INJ NEW DRUG ADDON: CPT

## 2024-05-29 PROCEDURE — 99283 EMERGENCY DEPT VISIT LOW MDM: CPT

## 2024-05-29 RX ORDER — LABETALOL HYDROCHLORIDE 5 MG/ML
10 INJECTION, SOLUTION INTRAVENOUS ONCE
Status: COMPLETED | OUTPATIENT
Start: 2024-05-29 | End: 2024-05-29

## 2024-05-29 RX ORDER — ONDANSETRON 2 MG/ML
4 INJECTION INTRAMUSCULAR; INTRAVENOUS ONCE
Status: COMPLETED | OUTPATIENT
Start: 2024-05-29 | End: 2024-05-29

## 2024-05-29 RX ORDER — ONDANSETRON 4 MG/1
4 TABLET, ORALLY DISINTEGRATING ORAL EVERY 8 HOURS PRN
Qty: 21 TABLET | Refills: 0 | Status: SHIPPED | OUTPATIENT
Start: 2024-05-29 | End: 2024-06-05

## 2024-05-29 RX ADMIN — ONDANSETRON 4 MG: 2 INJECTION INTRAMUSCULAR; INTRAVENOUS at 13:54

## 2024-05-29 RX ADMIN — SODIUM CHLORIDE 1000 ML: 9 INJECTION, SOLUTION INTRAVENOUS at 15:06

## 2024-05-29 RX ADMIN — LABETALOL HYDROCHLORIDE 10 MG: 5 INJECTION, SOLUTION INTRAVENOUS at 14:14

## 2024-05-29 NOTE — ED PROVIDER NOTES
EMERGENCY DEPARTMENT ENCOUNTER    Room Number:  09/09  PCP: Elie Dixon MD  Historian: Patient      HPI:  Chief Complaint: Headache, nausea  A complete HPI/ROS/PMH/PSH/SH/FH are unobtainable due to: None    Context: Marylu Georges is a 64 y.o. female who presents to the ED via private vehicle c/o acute headache with hypertension with nausea and vomiting started earlier today.  Was unable to take her blood pressure medications due to the nausea vomiting today.  No chest pain.  No diarrhea.      MEDICAL RECORD REVIEW    External (non-ED) record review: Reviewed epic shows that the patient does take Eliquis.              PAST MEDICAL HISTORY  Active Ambulatory Problems     Diagnosis Date Noted    Malignant neoplasm of overlapping sites of left breast in female, estrogen receptor positive 01/22/2019    Family history of breast cancer in female 01/22/2019    Malignant neoplasm of overlapping sites of both breasts in female, estrogen receptor positive 11/10/2019    Hypertension 12/17/2019    Encounter for long-term (current) use of other medications 06/22/2021    Drug-induced hepatitis 09/30/2021    Atrial fibrillation 06/27/2022    Lymphedema of upper extremity, bilateral 06/27/2022    New onset atrial fibrillation 06/29/2022    Primary insomnia 10/25/2022    Trigger middle finger of left hand 03/08/2024     Resolved Ambulatory Problems     Diagnosis Date Noted    Anemia in neoplastic disease 01/22/2019    Axillary mass, right 01/22/2019    Poor venous access 01/30/2019    Fitting and adjustment of vascular catheter 02/01/2019    Syncope 07/30/2019    Nausea & vomiting 07/30/2019    Macrocytosis 07/30/2019    Immunocompromised state due to drug therapy 07/30/2019    Encounter for venous access device care 10/28/2019    Acute radiation dermatitis 12/17/2019    Arthritis of right hip 01/13/2020    Arthritis of left hip 05/26/2021    Transaminitis 06/27/2022    Rash 06/27/2022    Inflammatory arthritis 06/28/2022     Streptococcal arthritis of left ankle 06/27/2022     Past Medical History:   Diagnosis Date    Arthritis     Arthritis of back     Arthritis of neck     Breast cancer     CVA (cerebral vascular accident)     Fracture, fibula     Fracture, finger     Frozen shoulder     Hip arthrosis 01/17    Hip pain     History of fracture of leg 1987    History of radiation therapy     Knee swelling     Limited joint range of motion (ROM)     Low back strain     Periarthritis of shoulder     Rotator cuff syndrome 6/22    Skin sore     Vertigo          PAST SURGICAL HISTORY  Past Surgical History:   Procedure Laterality Date    AXILLARY LYMPH NODE BIOPSY/EXCISION Right     LYMPH NODE UNDER RIGHT ARM-MALIGNANT (DOUBLE MASTECTOMY)    BREAST BIOPSY Left     MALIGNANT    FRACTURE SURGERY  1987    Leg    HARDWARE REMOVAL      INCISION AND DRAINAGE LEG Left 06/30/2022    Procedure: INCISION AND DRAINAGE left ankle;  Surgeon: Kenneth Tran MD;  Location: [facility];  Service: Orthopedics;  Laterality: Left;    JOINT REPLACEMENT Bilateral mar 2020,july 2021    hips    MASTECTOMY W/ SENTINEL NODE BIOPSY Bilateral 09/16/2019    Procedure: BILATERLA MODIFIED RADICAL MASTECTOMY WITH BILATERAL SENTINEL LYMPH NODE BIOPSY;  Surgeon: Joby Barron Jr., MD;  Location: [facility];  Service: General    TOTAL HIP ARTHROPLASTY Right 03/02/2020    Procedure: RIGHT TOTAL HIP ARTHROPLASTY NATALY NAVIGATION;  Surgeon: Luis M Leonard MD;  Location: [facility] OR;  Service: Orthopedics;  Laterality: Right;    TOTAL HIP ARTHROPLASTY Left 07/20/2021    Procedure: Posterior LEFT TOTAL HIP ARTHROPLASTY NATALY NAVIGATION;  Surgeon: Luis M Leonard MD;  Location: [facility] OR;  Service: Orthopedics;  Laterality: Left;    VENOUS ACCESS DEVICE (PORT) INSERTION Right 02/01/2019    Procedure: INSERTION VENOUS ACCESS DEVICE;  Surgeon: Joby Barron Jr., MD;  Location: [facility];  Service: General    VENOUS ACCESS DEVICE (PORT) REMOVAL N/A  10/30/2019    Procedure: Mediport Removal;  Surgeon: Joby Barron Jr., MD;  Location: [facility] OR;  Service: General         FAMILY HISTORY  Family History   Problem Relation Age of Onset    Lung cancer Father     Irritable bowel syndrome Father     Cancer Mother     Breast cancer Mother     Liver disease Mother     Breast cancer Sister 48    Breast cancer Maternal Grandmother     Breast cancer Paternal Grandmother     Breast cancer Maternal Uncle     Malig Hyperthermia Neg Hx          SOCIAL HISTORY  Social History     Socioeconomic History    Marital status:      Spouse name: Cruz    Number of children: 0   Tobacco Use    Smoking status: Never    Smokeless tobacco: Never   Vaping Use    Vaping status: Never Used   Substance and Sexual Activity    Alcohol use: Yes     Comment: occasional    Drug use: No    Sexual activity: Not Currently     Partners: Male     Birth control/protection: Abstinence         ALLERGIES  Benadryl [diphenhydramine], Erythromycin, Levaquin [levofloxacin], and Penicillins        REVIEW OF SYSTEMS  Review of Systems     All systems reviewed and negative except for those discussed in HPI.       PHYSICAL EXAM    I have reviewed the triage vital signs and nursing notes.    ED Triage Vitals   Temp Heart Rate Resp BP SpO2   05/29/24 1243 05/29/24 1243 05/29/24 1243 05/29/24 1245 05/29/24 1243   97.3 °F (36.3 °C) 105 17 162/99 99 %      Temp src Heart Rate Source Patient Position BP Location FiO2 (%)   -- -- -- -- --              Physical Exam  General: No acute distress, nontoxic  HEENT: Mucous membranes moist, atraumatic, EOMI  Neck: Full ROM  Pulm: Symmetric chest rise, nonlabored, lungs CTAB  Cardiovascular: Regular rate and rhythm, intact distal pulses  GI: Soft, nontender, nondistended, no rebound, no guarding, bowel sounds present  MSK: Full ROM, no deformity  Skin: Warm, dry  Neuro: Awake, alert, oriented x 4, GCS 15, no facial droop, no dysarthria aphasia, moving all  extremities, no focal deficits  Psych: Calm, cooperative        LAB RESULTS  Recent Results (from the past 24 hour(s))   Comprehensive Metabolic Panel    Collection Time: 05/29/24  1:34 PM    Specimen: Blood   Result Value Ref Range    Glucose 113 (H) 65 - 99 mg/dL    BUN 18 8 - 23 mg/dL    Creatinine 0.79 0.57 - 1.00 mg/dL    Sodium 138 136 - 145 mmol/L    Potassium 4.3 3.5 - 5.2 mmol/L    Chloride 101 98 - 107 mmol/L    CO2 24.3 22.0 - 29.0 mmol/L    Calcium 9.6 8.6 - 10.5 mg/dL    Total Protein 6.8 6.0 - 8.5 g/dL    Albumin 4.5 3.5 - 5.2 g/dL    ALT (SGPT) 147 (H) 1 - 33 U/L    AST (SGOT) 77 (H) 1 - 32 U/L    Alkaline Phosphatase 89 39 - 117 U/L    Total Bilirubin 0.7 0.0 - 1.2 mg/dL    Globulin 2.3 gm/dL    A/G Ratio 2.0 g/dL    BUN/Creatinine Ratio 22.8 7.0 - 25.0    Anion Gap 12.7 5.0 - 15.0 mmol/L    eGFR 82.6 >60.0 mL/min/1.73   Lipase    Collection Time: 05/29/24  1:34 PM    Specimen: Blood   Result Value Ref Range    Lipase 24 13 - 60 U/L   Lactic Acid, Plasma    Collection Time: 05/29/24  1:34 PM    Specimen: Blood   Result Value Ref Range    Lactate 1.7 0.5 - 2.0 mmol/L   CBC Auto Differential    Collection Time: 05/29/24  1:34 PM    Specimen: Blood   Result Value Ref Range    WBC 6.93 3.40 - 10.80 10*3/mm3    RBC 4.68 3.77 - 5.28 10*6/mm3    Hemoglobin 15.1 12.0 - 15.9 g/dL    Hematocrit 45.1 34.0 - 46.6 %    MCV 96.4 79.0 - 97.0 fL    MCH 32.3 26.6 - 33.0 pg    MCHC 33.5 31.5 - 35.7 g/dL    RDW 13.9 12.3 - 15.4 %    RDW-SD 49.3 37.0 - 54.0 fl    MPV 8.9 6.0 - 12.0 fL    Platelets 165 140 - 450 10*3/mm3    Neutrophil % 88.3 (H) 42.7 - 76.0 %    Lymphocyte % 6.1 (L) 19.6 - 45.3 %    Monocyte % 4.8 (L) 5.0 - 12.0 %    Eosinophil % 0.3 0.3 - 6.2 %    Basophil % 0.4 0.0 - 1.5 %    Immature Grans % 0.1 0.0 - 0.5 %    Neutrophils, Absolute 6.12 1.70 - 7.00 10*3/mm3    Lymphocytes, Absolute 0.42 (L) 0.70 - 3.10 10*3/mm3    Monocytes, Absolute 0.33 0.10 - 0.90 10*3/mm3    Eosinophils, Absolute 0.02 0.00 - 0.40  10*3/mm3    Basophils, Absolute 0.03 0.00 - 0.20 10*3/mm3    Immature Grans, Absolute 0.01 0.00 - 0.05 10*3/mm3    nRBC 0.0 0.0 - 0.2 /100 WBC       Ordered the above labs and independently interpreted results. My findings will be discussed in the medical decision making section below        RADIOLOGY  CT Head Without Contrast    Result Date: 5/29/2024  CT OF THE BRAIN WITHOUT CONTRAST 05/29/2024  HISTORY: Headache. Hypertension.  Axial images were obtained through the brain without intravenous contrast.  There is mild diffuse atrophy. There is minimal decreased attenuation of the periventricular white matter bilaterally consistent with minimal small vessel white matter ischemic disease. There is no evidence of acute infarction, hemorrhage, midline shift or mass effect.  No bony abnormalities are seen.      No acute process.  Radiation dose reduction techniques were utilized, including automated exposure control and exposure modulation based on body size.        Ordered the above noted radiological studies.  Independently interpreted by me and my independent review of findings can be found in the ED Course.  See dictation for official radiology interpretation.      PROCEDURES    Procedures        MEDICATIONS GIVEN IN ER    Medications   sodium chloride 0.9 % bolus 1,000 mL (has no administration in time range)   ondansetron (ZOFRAN) injection 4 mg (4 mg Intravenous Given 5/29/24 1354)   labetalol (NORMODYNE,TRANDATE) injection 10 mg (10 mg Intravenous Given 5/29/24 1414)         PROGRESS, DATA ANALYSIS, CONSULTS, AND MEDICAL DECISION MAKING    Please note that this section constitutes my independent interpretation of clinical data including lab results, radiology, EKG's.  This constitutes my independent professional opinion regarding differential diagnosis and management of this patient.  It may include any factors such as history from outside sources, review of external records, social determinants of health,  management of medications, response to those treatments, and discussions with other providers.    Differential Diagnosis and Plan: Initial concern for hypertensive urgency, migraine, viral process, intracranial hemorrhage, electrolyte abnormalities, dehydration, among others.  Plan for labs, CT head, supportive care, and reevaluation with results.    Additional sources:  - Discussed/ obtained information from independent historians:       - (Social Determinants of Health): None     - Shared decision making:  Patient and  at bedside fully updated on and in agreement with the course and plan moving forward    ED Course as of 05/29/24 1442   Wed May 29, 2024   1350 CT Head Without Contrast  Per my independent interpretation of the CT head, no overtly obvious intracranial hemorrhage although subtle finding could be missed and will defer to final radiology report [DC]   1408 WBC: 6.93 [DC]   1408 Hemoglobin: 15.1 [DC]   1408 Lipase: 24 [DC]   1408 Glucose(!): 113 [DC]   1408 BUN: 18 [DC]   1408 Creatinine: 0.79 [DC]   1408 Sodium: 138 [DC]   1408 Potassium: 4.3 [DC]   1431 Lactate: 1.7 [DC]   1436 Patient with resolved symptoms at this time, will plan for some IV fluids and a discharge prescription for Zofran with outpatient follow-up.  No signs of acute CVA, intracranial hemorrhage, blood pressure has improved, and no other acute emergent findings otherwise.  All questions or concerns addressed. [DC]      ED Course User Index  [DC] Jimy Hankins MD       Hospitalization Considered?:     Orders Placed During This Visit:  Orders Placed This Encounter   Procedures    CT Head Without Contrast    Comprehensive Metabolic Panel    Urinalysis With Microscopic If Indicated (No Culture) - Urine, Clean Catch    Lipase    Lactic Acid, Plasma    CBC Auto Differential    CBC & Differential       Additional orders considered but not placed:      Independent interpretation of labs, radiology studies, and discussions with  consultants: See ED Course        AS OF 14:42 EDT VITALS:    BP - 136/99  HR - 91  TEMP - 97.3 °F (36.3 °C)  02 SATS - 97%          DIAGNOSIS  Final diagnoses:   Hypertension, unspecified type   Nausea and vomiting, unspecified vomiting type   Nonintractable headache, unspecified chronicity pattern, unspecified headache type   History of breast cancer         DISPOSITION  ED Disposition       ED Disposition   Discharge    Condition   Stable    Comment   --                Please note that portions of this document were completed with a voice recognition program.    Note Disclaimer: At [facility], we believe that sharing information builds trust and better relationships. You are receiving this note because you recently visited [facility]. It is possible you will see health information before a provider has talked with you about it. This kind of information can be easy to misunderstand. To help you fully understand what it means for your health, we urge you to discuss this note with your provider.                       Jimy Hankins MD  05/29/24 8084

## 2024-05-29 NOTE — TELEPHONE ENCOUNTER
Caller: Marylu Georges    Relationship: Self    Best call back number: [phone]      What was the call regarding: PATIENT IS HAVING A HARD TIME KEEPING ANYTHING DOWN, HER BLOOD PRESSURE IS RUNNING ABOUT 156/121  SHE IS NOT SURE WHAT TO DO     PLEASE CALL PATIENT TO ADVISE

## 2024-05-29 NOTE — TELEPHONE ENCOUNTER
Returned call to Ms Diazmayte.  She reports that she woke up around 1:30 this morning with a headache, she was sick to her stomach and has not been able to keep anything down.  Her blood pressure has been running very high, last reading 156/121.  Discussed with NP team who advises she present to the ER for these symptoms for further evaluation.  Ms Destini voiced understanding.

## 2024-05-29 NOTE — DISCHARGE INSTRUCTIONS
Continue care medications, take medications for nausea as needed, stay well-hydrated, close outpatient PCP follow-up and oncology follow-up, ED return for worsening symptoms as needed.

## 2024-05-29 NOTE — ED NOTES
PT to ER via PV from home for headache and vomiting. PT said diastolic number is high.    Pt has breast cancer

## 2024-06-03 ENCOUNTER — TELEPHONE (OUTPATIENT)
Dept: ONCOLOGY | Facility: CLINIC | Age: 64
End: 2024-06-03
Payer: MEDICARE

## 2024-06-04 ENCOUNTER — INFUSION (OUTPATIENT)
Dept: ONCOLOGY | Facility: HOSPITAL | Age: 64
End: 2024-06-04
Payer: MEDICARE

## 2024-06-04 ENCOUNTER — OFFICE VISIT (OUTPATIENT)
Dept: ONCOLOGY | Facility: CLINIC | Age: 64
End: 2024-06-04
Payer: MEDICARE

## 2024-06-04 VITALS
HEART RATE: 76 BPM | DIASTOLIC BLOOD PRESSURE: 80 MMHG | HEIGHT: 65 IN | SYSTOLIC BLOOD PRESSURE: 115 MMHG | BODY MASS INDEX: 29.51 KG/M2 | OXYGEN SATURATION: 94 % | WEIGHT: 177.1 LBS | TEMPERATURE: 97.9 F

## 2024-06-04 DIAGNOSIS — F51.01 PRIMARY INSOMNIA: ICD-10-CM

## 2024-06-04 DIAGNOSIS — E86.0 DEHYDRATION: Primary | ICD-10-CM

## 2024-06-04 DIAGNOSIS — C50.812 MALIGNANT NEOPLASM OF OVERLAPPING SITES OF LEFT BREAST IN FEMALE, ESTROGEN RECEPTOR POSITIVE: ICD-10-CM

## 2024-06-04 DIAGNOSIS — E86.0 DEHYDRATION: ICD-10-CM

## 2024-06-04 DIAGNOSIS — T50.905A DRUG-INDUCED HEPATITIS: ICD-10-CM

## 2024-06-04 DIAGNOSIS — I48.0 PAROXYSMAL ATRIAL FIBRILLATION: ICD-10-CM

## 2024-06-04 DIAGNOSIS — C50.812 MALIGNANT NEOPLASM OF OVERLAPPING SITES OF LEFT BREAST IN FEMALE, ESTROGEN RECEPTOR POSITIVE: Primary | ICD-10-CM

## 2024-06-04 DIAGNOSIS — K71.6 DRUG-INDUCED HEPATITIS: ICD-10-CM

## 2024-06-04 DIAGNOSIS — I10 PRIMARY HYPERTENSION: ICD-10-CM

## 2024-06-04 DIAGNOSIS — Z17.0 MALIGNANT NEOPLASM OF OVERLAPPING SITES OF LEFT BREAST IN FEMALE, ESTROGEN RECEPTOR POSITIVE: Primary | ICD-10-CM

## 2024-06-04 DIAGNOSIS — I89.0 LYMPHEDEMA OF UPPER EXTREMITY, BILATERAL: ICD-10-CM

## 2024-06-04 DIAGNOSIS — Z17.0 MALIGNANT NEOPLASM OF OVERLAPPING SITES OF LEFT BREAST IN FEMALE, ESTROGEN RECEPTOR POSITIVE: ICD-10-CM

## 2024-06-04 LAB
ALBUMIN SERPL-MCNC: 4.3 G/DL (ref 3.5–5.2)
ALBUMIN/GLOB SERPL: 1.9 G/DL
ALP SERPL-CCNC: 87 U/L (ref 39–117)
ALT SERPL W P-5'-P-CCNC: 171 U/L (ref 1–33)
ANION GAP SERPL CALCULATED.3IONS-SCNC: 10.7 MMOL/L (ref 5–15)
AST SERPL-CCNC: 92 U/L (ref 1–32)
BASOPHILS # BLD AUTO: 0.04 10*3/MM3 (ref 0–0.2)
BASOPHILS NFR BLD AUTO: 1 % (ref 0–1.5)
BILIRUB SERPL-MCNC: 0.5 MG/DL (ref 0–1.2)
BUN SERPL-MCNC: 16 MG/DL (ref 8–23)
BUN/CREAT SERPL: 18.6 (ref 7–25)
CALCIUM SPEC-SCNC: 9.7 MG/DL (ref 8.6–10.5)
CHLORIDE SERPL-SCNC: 102 MMOL/L (ref 98–107)
CO2 SERPL-SCNC: 23.3 MMOL/L (ref 22–29)
CREAT SERPL-MCNC: 0.86 MG/DL (ref 0.57–1)
DEPRECATED RDW RBC AUTO: 51.1 FL (ref 37–54)
EGFRCR SERPLBLD CKD-EPI 2021: 74.6 ML/MIN/1.73
EOSINOPHIL # BLD AUTO: 0.05 10*3/MM3 (ref 0–0.4)
EOSINOPHIL NFR BLD AUTO: 1.2 % (ref 0.3–6.2)
ERYTHROCYTE [DISTWIDTH] IN BLOOD BY AUTOMATED COUNT: 14 % (ref 12.3–15.4)
GLOBULIN UR ELPH-MCNC: 2.3 GM/DL
GLUCOSE SERPL-MCNC: 171 MG/DL (ref 65–99)
HCT VFR BLD AUTO: 45.3 % (ref 34–46.6)
HGB BLD-MCNC: 14.8 G/DL (ref 12–15.9)
IMM GRANULOCYTES # BLD AUTO: 0.02 10*3/MM3 (ref 0–0.05)
IMM GRANULOCYTES NFR BLD AUTO: 0.5 % (ref 0–0.5)
LYMPHOCYTES # BLD AUTO: 0.62 10*3/MM3 (ref 0.7–3.1)
LYMPHOCYTES NFR BLD AUTO: 15.2 % (ref 19.6–45.3)
MCH RBC QN AUTO: 32.3 PG (ref 26.6–33)
MCHC RBC AUTO-ENTMCNC: 32.7 G/DL (ref 31.5–35.7)
MCV RBC AUTO: 98.9 FL (ref 79–97)
MONOCYTES # BLD AUTO: 0.33 10*3/MM3 (ref 0.1–0.9)
MONOCYTES NFR BLD AUTO: 8.1 % (ref 5–12)
NEUTROPHILS NFR BLD AUTO: 3.03 10*3/MM3 (ref 1.7–7)
NEUTROPHILS NFR BLD AUTO: 74 % (ref 42.7–76)
NRBC BLD AUTO-RTO: 0 /100 WBC (ref 0–0.2)
PLATELET # BLD AUTO: 156 10*3/MM3 (ref 140–450)
PMV BLD AUTO: 9.3 FL (ref 6–12)
POTASSIUM SERPL-SCNC: 4.6 MMOL/L (ref 3.5–5.2)
PROT SERPL-MCNC: 6.6 G/DL (ref 6–8.5)
RBC # BLD AUTO: 4.58 10*6/MM3 (ref 3.77–5.28)
SODIUM SERPL-SCNC: 136 MMOL/L (ref 136–145)
WBC NRBC COR # BLD AUTO: 4.09 10*3/MM3 (ref 3.4–10.8)

## 2024-06-04 PROCEDURE — 25810000003 SODIUM CHLORIDE 0.9 % SOLUTION: Performed by: INTERNAL MEDICINE

## 2024-06-04 PROCEDURE — 80053 COMPREHEN METABOLIC PANEL: CPT

## 2024-06-04 PROCEDURE — 85025 COMPLETE CBC W/AUTO DIFF WBC: CPT

## 2024-06-04 PROCEDURE — 96360 HYDRATION IV INFUSION INIT: CPT

## 2024-06-04 RX ORDER — PREDNISONE 10 MG/1
5 TABLET ORAL DAILY
Qty: 30 TABLET | Refills: 1 | Status: SHIPPED | OUTPATIENT
Start: 2024-06-04

## 2024-06-04 RX ORDER — EXEMESTANE 25 MG/1
25 TABLET ORAL DAILY
Qty: 30 TABLET | Refills: 5 | Status: SHIPPED | OUTPATIENT
Start: 2024-06-04 | End: 2024-06-04

## 2024-06-04 RX ORDER — FAMOTIDINE 20 MG/1
20 TABLET, FILM COATED ORAL 2 TIMES DAILY
Qty: 60 TABLET | Refills: 2 | Status: SHIPPED | OUTPATIENT
Start: 2024-06-04

## 2024-06-04 RX ORDER — SODIUM CHLORIDE 9 MG/ML
1000 INJECTION, SOLUTION INTRAVENOUS CONTINUOUS
Status: DISCONTINUED | OUTPATIENT
Start: 2024-06-04 | End: 2024-06-04 | Stop reason: HOSPADM

## 2024-06-04 RX ADMIN — SODIUM CHLORIDE 1000 ML: 9 INJECTION, SOLUTION INTRAVENOUS at 11:39

## 2024-06-04 NOTE — PROGRESS NOTES
Subjective     REASON FOR FOLLOW UP:  1. 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 R BREAST MASS WITH GIANT R AXILLARY ADENOPATHY.  SHE UNDERWENT DOSE DENSE AC, TAXOL, BILATERAL MASTECTOMIES, DRAMATIC NEAR COMPLETE MT, RADIATION THERAPY AND ADJUVANT FEMARA STOPPED ON 12/21 BECAUSE DRUG INDUCED HEPATITIS, SWITCHED TO AROMASIN 2/22: NO SIDE EFFECTS.    3. FAMILY HISTORY OF BREAST CANCER IN MOTHER AND SISTER, SISTER WAS TESTED FOR BRCA AND WAS NEGATIVE.    4. LEFT OVARIAN CYST , IT HAS BEEN PRESENT FOR LONG TIME BUT IS GETTING LARGER, ASYMPTOMATIC, NEED FOR , PELVIC US AND GYN ONC EVal: no need for any intervention                  5. LEFT HIP PAIN SEVERE ARTHRITIS, REAL NEED FOR LEFT HIP REPLACEMENT: PERFORMED 8/21    6. DRUG INDUCED HEPATITIS,  FINALLY STOPPED FEMARA: DRAMATIC IMPROVEMENT AND RESOLUTION.      History of present illness:    On 06/04/2024, the patient has returned to the office for review after being seen in the emergency room a few days ago with persistent profuse nausea and vomiting, volume contraction and profound fatigue. She was given Zofran that she has been taking once or twice a day. Her stomach feels better. She has lesser degree of heartburn and indigestion than before. Very likely all this was triggered by the effect of radiation therapy on her chest. The patient at this time has no chest pain, cough or sputum production. No palpitations. Her atrial fibrillation seems to be quiet. The patient is taking her Eliquis and her metoprolol.     She is not taking any anti-inflammatory medications or Tylenol. She is not drinking any alcohol. Her liver function test remains altered with higher raise in her SGOT and SGPT today. She is not taking any prednisone.                Past Medical History:   Diagnosis Date    Anemia in neoplastic disease     Arthritis     Arthritis of back     Arthritis of neck     Breast cancer     Left    CVA (cerebral vascular  accident)     Encounter for long-term (current) use of other medications 06/22/2021    Fracture, fibula     Fracture, finger     Frozen shoulder     Hip arthrosis 01/17    Hip pain     RIGHT HIP... CYST    History of fracture of leg 1987    History of radiation therapy     LAST TREATMENT      Hypertension     Knee swelling     Limited joint range of motion (ROM)     RIGHT HIP    Low back strain     Periarthritis of shoulder     Rotator cuff syndrome 6/22    Skin sore     OPEN SORE LEFT BREAST    Syncope     Vertigo            Past Surgical History:   Procedure Laterality Date    AXILLARY LYMPH NODE BIOPSY/EXCISION Right     LYMPH NODE UNDER RIGHT ARM-MALIGNANT (DOUBLE MASTECTOMY)    BREAST BIOPSY Left     MALIGNANT    FRACTURE SURGERY  1987    Leg    HARDWARE REMOVAL      INCISION AND DRAINAGE LEG Left 06/30/2022    Procedure: INCISION AND DRAINAGE left ankle;  Surgeon: Kenneth Tran MD;  Location: [facility];  Service: Orthopedics;  Laterality: Left;    JOINT REPLACEMENT Bilateral mar 2020,july 2021    hips    MASTECTOMY W/ SENTINEL NODE BIOPSY Bilateral 09/16/2019    Procedure: BILATERLA MODIFIED RADICAL MASTECTOMY WITH BILATERAL SENTINEL LYMPH NODE BIOPSY;  Surgeon: Joby Barron Jr., MD;  Location: [facility] OR;  Service: General    TOTAL HIP ARTHROPLASTY Right 03/02/2020    Procedure: RIGHT TOTAL HIP ARTHROPLASTY NATALY NAVIGATION;  Surgeon: Luis M Leonard MD;  Location: [facility] OR;  Service: Orthopedics;  Laterality: Right;    TOTAL HIP ARTHROPLASTY Left 07/20/2021    Procedure: Posterior LEFT TOTAL HIP ARTHROPLASTY NATALY NAVIGATION;  Surgeon: Luis M Leonard MD;  Location: [facility] OR;  Service: Orthopedics;  Laterality: Left;    VENOUS ACCESS DEVICE (PORT) INSERTION Right 02/01/2019    Procedure: INSERTION VENOUS ACCESS DEVICE;  Surgeon: Joby Barron Jr., MD;  Location: [facility];  Service: General    VENOUS ACCESS DEVICE (PORT) REMOVAL N/A 10/30/2019    Procedure:  Mediport Removal;  Surgeon: Joby Barron Jr., MD;  Location: [facility] OR;  Service: General        Current Outpatient Medications on File Prior to Visit   Medication Sig Dispense Refill    amLODIPine (NORVASC) 2.5 MG tablet Take 1 tablet by mouth Daily. 30 tablet 11    apixaban (ELIQUIS) 5 MG tablet tablet Take 1 tablet by mouth 2 (Two) Times a Day.      metoprolol succinate XL (TOPROL-XL) 50 MG 24 hr tablet Take 2 tablets po q am and one tablet po q pm 270 tablet 3    ondansetron ODT (ZOFRAN-ODT) 4 MG disintegrating tablet Place 1 tablet on the tongue Every 8 (Eight) Hours As Needed for Nausea or Vomiting for up to 7 days. 21 tablet 0    polyethylene glycol 17 g packet Take 17 g by mouth 2 (Two) Times a Day As Needed (constipation).      tiZANidine (ZANAFLEX) 4 MG tablet Take 1 tablet by mouth Every 6 (Six) Hours As Needed for Muscle Spasms.      [DISCONTINUED] digoxin (LANOXIN) 125 MCG tablet Take 1 tablet by mouth Daily.      [DISCONTINUED] doxylamine (UNISOM) 25 MG tablet Take 1 tablet by mouth At Night As Needed for Sleep. (Patient not taking: Reported on 6/4/2024)      [DISCONTINUED] exemestane (AROMASIN) 25 MG tablet        Current Facility-Administered Medications on File Prior to Visit   Medication Dose Route Frequency Provider Last Rate Last Admin    Chlorhexidine Gluconate Cloth 2 % pads 1 each  1 each Apply externally Take As Directed Luis M Leonard MD            ALLERGIES:    Allergies   Allergen Reactions    Benadryl [Diphenhydramine] Itching     INCREASES BLOOD PRESSURE    Erythromycin GI Intolerance    Levaquin [Levofloxacin] GI Intolerance    Penicillins GI Intolerance        Social History     Socioeconomic History    Marital status:      Spouse name: Cruz    Number of children: 0   Tobacco Use    Smoking status: Never    Smokeless tobacco: Never   Vaping Use    Vaping status: Never Used   Substance and Sexual Activity    Alcohol use: Yes     Comment: occasional    Drug use: No     "Sexual activity: Not Currently     Partners: Male     Birth control/protection: Abstinence        Family History   Problem Relation Age of Onset    Lung cancer Father     Irritable bowel syndrome Father     Cancer Mother     Breast cancer Mother     Liver disease Mother     Breast cancer Sister 48    Breast cancer Maternal Grandmother     Breast cancer Paternal Grandmother     Breast cancer Maternal Uncle     Malig Hyperthermia Neg Hx             Objective     Vitals:    06/04/24 1026   BP: 115/80   Pulse: 76   Temp: 97.9 °F (36.6 °C)   TempSrc: Oral   SpO2: 94%   Weight: 80.3 kg (177 lb 1.6 oz)   Height: 165.1 cm (65\")   PainSc: 0-No pain             6/4/2024    10:28 AM   Current Status   ECOG score 0     Exam:             GENERAL:  Well-developed, Patient  in no acute distress.   SKIN:  Warm, dry ,NO purpura ,no rash.  HEENT:  Pupils were equal and reactive to light and accomodation, conjunctivae noninjected,  normal visual acuity.   NECK:  Supple with good range of motion; no thyromegaly , no JVD or bruits,.No carotid artery pain, no carotid abnormal pulsation   LYMPHATICS:  No cervical, NO supraclavicular, NO axillary, NO inguinal adenopathies.  CARDIAC   normal rate , regular rhythm, without murmur,NO rubs NO S3 NO S4   LUNGS: normal breath sounds bilateral, no wheezing, NO rhonchi, NO crackles ,NO rubs.  VASCULAR VENOUS: no cyanosis, NO collateral circulation, NO varicosities, NO edema, NO palpable cords, NO pain,NO erythema, NO pigmentation of the skin.  ABDOMEN:  Soft, NO pain,no hepatomegaly, no splenomegaly,no masses, no ascites, no collateral circulation,no distention.  EXTREMITIES  AND SPINE:  No clubbing, no cyanosis ,no deformities , no pain .No kyphosis,  no pain in spine, no pain in ribs , no pain in pelvic bone.  NEUROLOGICAL:  Patient was awake, alert, oriented to time, person and place.    RECENT LABS:  Lab Results   Component Value Date    WBC 4.09 06/04/2024    HGB 14.8 06/04/2024    HCT 45.3 " 06/04/2024    MCV 98.9 (H) 06/04/2024     06/04/2024       Lab Results   Component Value Date    GLUCOSE 171 (H) 06/04/2024    BUN 16 06/04/2024    CREATININE 0.86 06/04/2024    EGFR 74.6 06/04/2024    BCR 18.6 06/04/2024    K 4.6 06/04/2024    CO2 23.3 06/04/2024    CALCIUM 9.7 06/04/2024    ALBUMIN 4.3 06/04/2024    BILITOT 0.5 06/04/2024    AST 92 (C) 06/04/2024     (C) 06/04/2024        Assessment & Plan    1.  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   For at least 4 years, she has had an in crescendo mass in the left breast that has produced a gigantic tumor that was close to 25 cm in size and is replacing most of the anatomy of the left breast. There are areas of ulceration necrosis and bleeding and the mass is fixed to the chest wall. She has abundant amount of collateral circulation in the left anterior chest wall and it caught my attention the lack of any left axillary adenopathy. She has no lymphedema in the left upper extremity. In the right breast while in sitting position, no palpable abnormalities are documented. The right breast skin and nipple are normal. When the patient lies flat, there is a palpable mass at 9 o'clock from the nipple that measures probably 4 cm in size, very indistinct in regard to edges and margins. There was no mobility and no areas of tenderness. She has a giant adenopathy in the right axilla that measures close to 9 cm in size, uniform, no fluctuation, nontender, completely fixed to the axillary wall. There is no compromise of the skin.   Patient completed 4 cycles of neoadjuvant AC on 4/12/2019.    Patient completed neoadjuvant weekly Taxol x 12 treatments on 7/19/2019.  9/16/2019 bilateral mastectomy by Dr. Joby Barron with axillary lymph node dissection.  No residual malignancy.  10/30/2019 patient's Mediport was removed in anticipation of  radiation.  Radiation therapy under the care of Dr. Marmolejo 10/31/2019-1/13/2020 to the right chest wall.  Started on adjuvant Femara 2.5 mg daily 8/20/2019.  9/21/2020 continues on adjuvant Femara, tolerating it quite well.  Some mild hot flashes and mild arthralgias, all which are tolerable.  The patient returned to the office on 05/24/2021. Since the previous visit the patient proceeded with a PET scan and I have reviewed this with her in the PAC system showing chest wall on the left side area of enlightening SUV activity that is almost 3 cm long x 7 mm wide. It is behind the bone. It is very close to the lower aspect of her heart. The reset of the PET scan discloses no activity. This is also specific in regard to the ovaries and actually an ultrasound of her vagina looking into the ovaries documented an unilocular cyst on the left side with typical features of benignity and a  was completely normal 5.5.   The patient was further reviewed on 07/07/2021 after she has continued her Kisqali and completion of the 1st round of the medicine. Today her EKG disclosed a normal QT interval and this has been sent to Cardiology to be reported officially. 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 She has completed her radiation therapy to the epicardial right lymph node with no difficulties or side effects.   The patient was further reviewed on 09/30/2021. Patient has recovered well from her left hip replacement. She completed her Kisqali a week ago. Her white count is low today. The hemoglobin is stable. The platelet count is stable. Elevation in her liver function test, SGOT, SGPT noted. The patient is not drinking any alcohol. She is not taking any cholesterol medicine. She is not taking any anti-inflammatory medication and leads me to think that Kisqali is the culprit of this. Given these findings we advised the patient to put the Kisqali on hold until we recheck chemistry profile on  weekly basis for the next 3 or 4 weeks. We need to settle these numbers down before she continues the Kisqali. She probably will require dose adjustment at some point. Again, her hepatitis A, B and C serologies are negative.   n regard to her history of breast cancer she was reviewed on 11/19/2021. Since the previous visit the patient has had tumor marker CA 15-3 that has dropped to 20. Radiological assessment of her chest and abdomen CT scan that documents resolution of the epicardial lymphadenopathy in the right hemithorax, no pulmonary nodules or metastasis, no pleural effusion and no liver metastasis. No bone metastasis. Based on this information I do believe that the breast cancer is relatively quiet clinically, biochemically and radiologically and I advised her today to resume her Femara at the dose of 2.5 mg a day. The likelihood of Femara producing liver dysfunction is more than remote.   The patient was further reviewed on 12/29/2021. Recent review of her liver function tests a few days ago documented persistent elevation of her SGOT, SGPT, and alkaline phosphatase. She has discontinued most of the medicines and I have no other choice but discontinue the Femara. Since then and actually today is the 1st day in many weeks that the SGOT and SGPT and alkaline phosphatase are improving. The patient actually remains asymptomatic in this regard. Her liver is not enlarged. She has no jaundice. She has no rash, no skin lesions and no other new alterations. That is good news. Her white blood count, hemoglobin and platelets remain stable. 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 That is extremely rare. As I posted above, I discussed with all my partners in regard to how many times through their careers prescribing Femara to multiple breast cancers they have seen Femara  triggering hepatitis, none of them gave me a positive answer. I reviewed this information in [employer] and it is reported in less than 1% of patients taking this medication. I think we are in front of a rare situation but I want for her to withhold any further Femara treatment for the time being and I want to review her back in 3 weeks. If the liver function tests continue improving or normalize by then maybe we will have the answer once and for all. Raises the question what we will do next and I think the next medicine will be the utilization of Aromasin that has a different chemical component and different methodology for action. I made her aware of that. We are not going to go back in giving her Kisqali under the present circumstances given the confusion and all the issues pertinent to her liver dysfunction.   The patient was further reviewed on 03/07/2022. Since the previous visit the patient has stopped the Femara in 12/2021 and today her liver function tests are completely back to normality. This proves the point that Femara was the culprit phenomenon that I never have seen and discussed with my partner, Danelle Cespedes MD. He never has seen this neither.   The patient returned on 05/09/2022 with no symptoms of anything in particular besides minimal respiratory allergies and pain in the left hip associated with her previous surgery. Her clinical examination today is very much unremarkable, she looks fantastic. She has no symptoms or signs of breast cancer recurrence. White count, hemoglobin and platelets are normal. Her tumor marker CA 15-3 has remained normal and actually lower than ever and compliance with Aromasin has been 100% with excellent tolerance.   Given the circumstances of this I advised her to remain on Aromasin 25 mg a day and I find no use for this patient to go back onto Kisqali or medicines of this nature.   On 08/16/2022 we reviewed the patient in regard to her history of breast cancer.  Clinically she has not had any obvious recurrent disease in the chest wall in the lung anatomy or abdomen or pelvis. She remains on Aromasin adjuvantly and we are waiting to review tumor marker. She has a chest wall that is completely flat due to previous mastectomies and radiation therapy. There is no axillary adenopathies and the patient has in my opinion control of her disease process as far as we can tell. I advised her to remain on Aromasin 25 mg a day. I went ahead and scheduled a visit with us in a few weeks in order to further review radiological analysis that will be discussed below.  On 09/14/2022 the patient has had in the interim a CT scan of the chest, abdomen and pelvis for review personally. Her pulmonary anatomy remains normal, there is no pulmonary congestion, pleural effusions, pericardial effusion, cardiomegaly, hilar or mediastinal adenopathy. There is prepericardiac lymph node measuring almost 1.5 to 2 cm in size that is flat like a small finger that has been present before and has minimally enlarged. If this has anything to do with her previous history of breast cancer is difficult to state but this has been evaluated before with similarity in regards size and some degree of fluctuation. I think this does not constrain me from thinking with a normal tumor marker of CA 15-3 of 20 during the previous visit or compel to perform either removal or biopsy at this time or proceed with radiological assessment with a PET scan. I do believe that this is not representation of anything in particular. Her liver anatomy and the rest of the bony anatomy, pancreas, kidneys, spleen and retroperitoneum remain unchanged. Given this finding I advised the patient to proceed and continue her Aromasin without the need to add any other medication to her regimen of medication especially in the background of A-fib. I advised her to proceed with reassessment with me in 6 weeks with a repeat CBC, CMP and CA 15-3. In that  moment also we will obtain a liquid biopsy for further analysis. Her liver function test has remained normal and Aromasin has not produced any chemical hepatitis. I advised her again to remain on this medicine by itself.   On 10/25/2022 the patient remains on Aromasin. She has no symptoms or signs that would indicate breast cancer recurrence and the latest tumor marker CA 15-3 was 20 two months ago. We have another one pending today. Given these circumstances I advised her to remain on Aromasin for the time being. I find no need for radiological assessment at this time. She will be called at home with the report of her tumor marker.   On 12/28/2022 the patient had no symptoms or signs of breast cancer progression or recurrence on any level. Tolerance to Aromasin is excellent with no side effects and she has 100% compliance on the medication. She was advised to remain on the medication. Her tumor marker CA 15-3 has remained normal. No plans for radiological assessment unless new clinical changes occur or tumor marker changes.   On 03/29/2023 the patient has no symptoms or signs of breast cancer recurrence. She continues taking her aromatase inhibitor medicine on a routine basis with 100% compliance and no significant side effects from the medication. Clinically she has no evidence of breast cancer recurrence and during the previous visit her CA 15-3 remained normal, another one is pending today that will be discussed with her on the telephone once it becomes available. Given the stability of her clinical picture and the excellent clinical status I advised the patient to remain on her medicine for the time being returning to see us back every 3 months with a CBC, CMP and CA 15-3. I find no need for radiological assessment on her at this point.   6/27/2023: Patient reviewed back today in office. She does not have any clinical findings suggestive of malignancy reoccurrence. She remains on Aromasin daily. Labs reviewed.  CBC and CMP are both normal. CA 15-3 is normal at 18.5. Patient encouraged to become more active to help with weight gain and arthritis.   On 09/27/2023 the patient was further reviewed. She has no symptoms or signs of breast cancer recurrence. Her chest wall is unremarkable, the lymphedema in the left upper extremity is a grade 1 at the most controlled and she has no need for any elastic support. She has not developed any cellulitis or infection in the left upper extremity. The patient is taking Aromasin adjuvantly 100% compliance, no evidence of recurrent disease. Normal white count, hemoglobin and platelets pending chemistry profile. We advised her to remain on Aromasin 25 mg a day. She will be returning to see us back in 3 months with repeat CBC, CMP and CA 15-3. No need for radiological assessment on her at this point.    On 12/28/2023 no symptoms or signs of breast cancer recurrence. My conversation with, Danni Odell MD, today took place in regard to the potentiality for pleuritic pain on the right side. Lung auscultation is normal and the patient has no symptoms. This pain comes and goes very sporadically and is not bothering her on routine basis. Knowing that she has had a minimal pericardial alteration before we opted to proceed with a CT angiogram of the chest that will be done tomorrow and we will review this with the patient at some point on the telephone in the next 48 hours or so.     If we assume that her cancer marker CA 15-3 remains stable the patient will remain on Aromasin and we will review her radiological analysis again. Otherwise plan to review her back in 3 months with repeat CBC, CMP and CA 15-3.     On 01/18/2024, the patient was brought to the office to discuss the findings of the pathological report of her retrosternal mass that documents carcinoma of the breast, metastatic, ER positive, CO positive, HER2 negative. Further analysis of this by Clara has been requested.     On this basis, I  do believe that the patient has now officially metastatic breast cancer to different sites, specifically inside of the chest. I do not see any options in regard to surgical resection of this, neither radiation therapy because the heart is behind the tumoral site. Radiation therapy will be highly damaging to her heart, especially receiving anthracycline in the past. She had received Kisqali in the past with tremendous benefit and we will proceed with the management of this medicine at the dose of 200 mg twice a day. In anticipation of the use of this, the patient will have a magnesium supplement every day and some cashews every day and she will proceed with an EKG today to measure QT interval. This will be checked back in a couple of weeks.     The patient will require laboratory assessment every 2 weeks to monitor white count, hemoglobin and platelets in regard to Kisqali utilization.     I went ahead and requested a PET scan to be done as early as next week. The patient will have formal education and consent for Kisqali and the medicine will be delivered to her house to initiate this at some point probably in the middle of next week.     The patient will have the medication utilization at least every 6-8 weeks, repeat radiological assessment then and then decide how to proceed. We now know that her tumor marker is not useful to follow up on her disease process.    On 01/31/2024, the patient has not had any side effects of Kisqali administration along with Aromasin. Kisqali will continue at the present dose and the present schedule. We will continue rechecking her every couple of weeks and I will review her back in 4 weeks. Today her EKG shows atrial fibrillation with controlled ventricular response and a QT interval that remains stable, unchanged from previous EKG. Formal request in regard to EKG interpretation has been done.    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 She  has remained on her Aromasin and Kisqali and she is in the 2nd week of the 2nd batch of Kisqali at this time. She has not encountered any nausea or vomiting. She has not had any cardiac irregularity. She is constipated.     Her clinical examination is very benign. Her white count is low expected from the Kisqali with a normal hemoglobin and a normal platelet count. Her chemistry profile is profoundly abnormal, raises the question if now she has hepatitis induced by the Aromasin. 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 I went ahead and discontinued the Aromasin at this time and allowed her to continue the Kisqali until completion and she will require reassessment with laboratory testing on weekly basis from now on until we see what tendency her liver function test is going to go. If the numbers improve discontinuing the Aromasin the next step will be to deliver her therapy with Faslodex once a month, obviously with typical loading dose first. If the liver function test does not improve we will need to discontinue most of her medicines and reassess the status and figure out medicine by medicine one by one which is the one that is producing the hepatitis. Again the patient is not drinking any alcohol.    I went ahead and scheduled a CT scan of the chest to be done in 3 weeks from now and review with her in 4 weeks repeating CBC and CMP at that time. Her CA 15-3 has become useless in the follow up of her disease process. In the Next Generation Sequencing of the tissue documented from the chest biopsy unfortunately there is no other actionable mutation.     On 04/10/2024, the patient has been discontinued on Aromasin several weeks ago along with Kisqali 2 weeks ago given the presence of persistency of hepatitis induced by medication. She is not drinking alcohol. She is not taking ibuprofen. She is not taking cholesterol medicine and she is not taking excessive amount of Tylenol. Today, the patient has no  jaundice. The liver is not palpable. She has no tenderness in the right upper quadrant and the patient has had some mild improvement in her liver function test as posted above. This is good news. The liver functions were going in crescendo. Now they are going in decrescendo and that is again good news. I went ahead and repeated hepatitis serologies A, B and C. They remain negative. I requested antimitochondrial antibodies given the fact that the patient's mother has primary biliary cirrhosis. I also requested an AARON profile. In the meantime, obviously the real question is how to treat her metastatic breast cancer. I think we need to let her liver cool off before we move into any other intervention. I am planning to present her case in the lung cancer conference to see if any ideas in regard to removal of this tissue is available and also in the breast cancer conference in regard to any other ideas in regard to the treatment of her metastatic breast cancer with chemotherapy administration.     She will be informed about these conversations when they take place.    On 04/24/2024, as I discussed and left a note, in regard to the Multidisciplinary Lung Cancer Conference presentation and the Breast Cancer Conference presentation, the patient was suggested to be seen by Radiation Oncology and she has accomplished this today including the markings in preparation for palliative radiation therapy. This will be started as early as tomorrow or next week. 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     Obviously systemic therapy has been put on hold given the fact that she continues having chemical hepatitis. We have further modified medications including Eliquis thinking that this could also be a culprit. Unfortunately, her liver function test remains abnormal. We have performed anti-mitochondrial antibodies, AARON, hepatitis A, B and C serologies and all this testing has returned being negative. I would  not be surprised if we have to have the help of a GI team again. We will delay initiation of systemic therapy until her liver function returns to some normality.    On 05/07/2024 the patient's liver enzymes were reviewed having dramatic improvement after she started prednisone 30 mg a day a week ago after her liver enzymes further rashawn at that point. She has not been drinking any alcohol, neither taking any Tylenol to the point that the prednisone has helped her to subside a lot of her joint pain associated with osteoarthritis. Today her examination is very benign. Again her liver enzymes are almost half of what they were before and I advised her to modify the prednisone to 20 mg a day for 5 days, then 10 mg a day until she returns back in 3 weeks to see us. We will continue monitoring her liver enzymes. Today her white count, hemoglobin and platelets are normal and her alkaline phosphatase and bilirubin remain normal. SGOT and SGPT with dramatic improvement.    She will proceed today with initiation of palliative radiation therapy to the retrosternal mass.     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 Her symptoms have improved but they have not subsided completely. She is drinking better but I am going to go ahead and proceed with IV fluids today, normal saline 1000 mL IV along with Zofran. I went ahead and prescribed Pepcid 20 mg, 1 tablet b.i.d. 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     I would not be surprised if in the long run, we need to send her to be seen by Gastroenterology again.     I went ahead and asked her to  proceed with radiological assessment of her chest in 3 weeks from now that will be almost a month after completion of her radiation therapy and review her at that point. Subsequently, she will require some systemic therapy but everything depends on the status of her liver function at that time.     Eventually, I will transfer her care to Kayley Miguel MD upon my intermediate.          2. Ovarian cyst  05/17/2021: Her CA 15-3 was minimal elevated in comparison to previous assessment and pelvic ultrasound ordered. t raises the following question.   05/24/2021: This is also specific in regard to the ovaries and actually an ultrasound of her vagina looking into the ovaries documented an unilocular cyst on the left side with typical features of benignity and a  was completely normal 5.5.   6/27/2023: CA 15-3 is normal at 18.5.  On 09/27/2023 no issues pertinent to this. This will remain in observation.    On 12/28/2023 this is asymptomatic. No issues pertinent to this at this time.     On 01/18/2024, no issues pertinent.    On 01/31/2024, her ovarian cyst has not changed radiologically in size. This will be left alone for the time being.    On 03/08/2024 ovarian cyst is not an issue at this time.    On 04/10/2024, the ovarian cyst is asymptomatic. We will leave this alone.    On 05/08/2024 no issues pertinent to this at this time.    This issue is a mute at this point.      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   Her liver enzymes are completely normal today. She remains on Aromasin and obviously this patient never will be back on letrozole under any circumstances. This was documented through liver biopsy and through removal of the medication that triggered quick improvement in her liver function test.   On 09/14/2022 there is no evidence of drug induced hepatitis. Aromasin will be continued. The patient is aware that she never will be able to take Femara.   On 10/25/2022 her liver enzymes are completely normal  today. Since discontinuation of Femara her drug induced hepatitis has resolved. This situation was extremely rare.   ON 12/28/2022 no issues pertinent to liver function after discontinuation of Femara months ago.   On 03/29/2023 waiting to review her liver function. All of the abnormalities resolved after stopping letrozole.   6/27/2023: Liver enzymes remain normal.   No issues pertinent to this. She remains is observation.    On 12/28/2023 her liver function test is completely normal today. Aromasin is not producing any issues.    On 01/18/2024, no issues pertinent.    On 01/31/2024, no abnormalities in liver function test related to Femara because this medicine was discontinued time ago. Minimal alteration in liver function test related to Kisqali today with no implications.    On 03/08/2024 her liver function tests are very abnormal today. She is not drinking any alcohol, she is not taking any antiinflammatory medication or excessive Tylenol. My gut feeling is that it is going to be the Aromasin as the offender similar to what the Femara was the offender. If that is the case I will go ahead and stop Aromasin today and repeat laboratory testing on a weekly basis CMP until we see what the trend of her numbers will be. If that is not sufficient to make the liver function test to improve we will need to discontinue the Kisqali and keep working on discontinuation of medicines until we find the culprit.    Planning eventually to replace the Aromasin for Faslodex depending on the circumstances.     On 04/10/2024, the patient again has drug-induced hepatitis. Aromasin was discontinued. The liver abnormalities continue. Kisqali was discontinued. The liver function tests are finally improving today. The question was the combination of medicines or only 1 medicine and I do not know the answer. I have no way to prove it.    On 04/24/2024, as mentioned above, her chemistry hepatitis remains ongoing. Further testing has not  produced any benefit in regard to finding the cause. Modifying medications has not produced any benefit either. She will continue being monitored.    On 06/04/2024, in the struggle with drug-induced hepatitis, she only has improved with the use of prednisone leading me to believe that the patient has a component of autoimmune hepatitis.      4. Septic Arthritis  The patient has developed an episode of septic arthritis in many joints including left shoulder and left ankle. She required antibiotic therapy as per recently. Infectious Disease was involved in this. In fact I discussed the case with them on the telephone. I suggested choosing the PICC line to be placed on the right arm in order to be able to deliver antibiotic therapy according to the proper indication. She completed the PICC line and it was removed last week with no complications or problems. The right upper extremity is completely normal. It is difficult for me to know why she developed the septic arthritis. She is in contact with horses all the time. The pathogen that she had in the joint is very uncommon in my opinion and the patient had no obvious source including no sinuses, no dental source, no obvious cardiac source, no gastrointestinal or genitourinary source and no skin source. It raises the question if this pathogen could evade to colonic source and I think I feel the obligation for this patient to have at least a CT scan of the abdomen and pelvis and eventually in several months from now consider a colonoscopy. Another consideration could be a Cologuard in some way. At least the patient’s infection seems to be under control. Her joints are still sore today including left shoulder, left ankle. Local inflammatory signs are very much gone. The only area of inflammation that she has in the 1st MP joint on the left hand probably represents osteoarthritis, no more than that. She will remain in observation from this point of view.  On 09/14/2022 the  patient has no symptoms that would suggest any further spreading or new development of septic arthritis. My fear was related to the possible seeding of her hip replacement areas by bacteria during the bacteremia that she had not too long ago. It seems that that is not the case. Radiologically speaking I do not see any local inflammation or reaction in any of these anatomical sites. There is perfect symmetry in the hip areas in regard to all her hardware material. Her sedimentation rate is BACK TO normal. Her white count is normal and this process will be watched in absence of any other intervention.  On 10/25/2022 no new episodes of septic arthritis. I measured her sedimentation rate during the previous visit that was down to 9, previously was in the 8-9 category. This means resolution of an inflammatory process altogether.   On 12/28/2022 at this time she is experiencing her typical symptom of osteoarthritis in her hands and hips but no issues pertinent to septic arthritis whatsoever. Deformities in the hands are ongoing due to osteoarthritis with no other issues or problems. No lymphedema in upper extremities.   On 03/29/2023 no significant sequela from her multiple foci and septic arthritis. What she has now is just typical osteoarthritis symptomatology and she uses Tylenol for this and occasional ibuprofen.   6/27/2023: Patient continues to report intermittent joint discomfort. Continues to manage pain with tylenol and occasional ibuprofen.   No issues pertinent to this.    On 12/28/2023 there is no evidence of recurrence of septic arthritis at this time.    On 01/18/2024, no issues pertinent.    On 01/31/2024, no septic arthritis symptomatology at this point.    On 03/08/2024 no evidence of septic arthritis but now she has a trigger finger middle on the left hand that is becoming a nuisance. She has the significant degenerative changes associated with osteoarthritis in both hands but I think if we correct this  trigger finger she will be able to function better. Referral made to be seen by, Junior Delgado MD, Rheumatology.    On 04/10/2024, no evidence of septic arthritis.    On 04/24/2024, no issues in regard to septic arthritis at this time.    On 05/07/2024 the patient has been advised at this point to continue her prednisone after starting the medicine a week ago given her in [facility] [place] liver function test. Today her liver enzymes are 1/2 of what they were a week ago. She will drop her prednisone from 30 to 20 to take for 5 days and thereafter 10 mg a day after she returns back to see us in 10 days.    On 06/04/2024, no issues pertinent.        5. Atrial Fibrillation  The patient developed atrial fibrillation with rapid ventricular response during the hospitalization with septic arthritis. Echocardiogram documented very dilated left atrium. She is now receiving Eliquis, metoprolol, and digoxin. Her ventricular response is controlled today but she remains in AFib. She has requested a followup with Dr. Odell and I went ahead and made her an appointment. 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. If she had a fall and she is taking anticoagulants the only thing that we are going to have is just trouble. She has sold one of the horses. She will sell the other horse very soon and she will remain on land for the time being. Her  is back in town and he is helping her with consecution of groceries and things of this nature under the present circumstances. I advised her to minimize any trauma.  On 09/14/2022 the patient will be seen by Electrophysiology tomorrow in regard to consideration of cardioversion for her AFib. She has discussed this with Dr. Odell and I have reviewed this data. That is perfectly fine from my point of view. I find no form of contraindication from my side of the story if this is the methodology that is chosen to try  to revert her to normal sinus rhythm.  On 10/25/2022 the patient is in normal sinus rhythm today. She remains on anticoagulants. She has cardiology appointment tomorrow. I asked her to buy a pulse oximeter and I taught her how to interpret the little wave curve of the pulse oximeter in regard to her heart rate. Typically patients in A-fib have very irregular pulse chaotic and the little wave curves will be continuously changing and besides the pulse rate will be continuously changing depending on the speed of the process. That is very simple to interpret. If se develops that problem I advised her to call her cardiologist.   On 12/28/2022 today she is in normal sinus rhythm by cardiac auscultation. Echocardiogram to be done by Danni Odell MD, in the next few days and further recommendations at that point. We strongly advised the patient about the continuation of Eliquis.   On 03/29/2023 clinically her heart obeys to normal sinus rhythm.   6/27/2023: Patient continues to be followed by Dr. Odell and has a stress test next week with Dr. Goodman. Patient remains on Eliquis with good tolerance.   On 09/27/2023 the patient has been in A-fib since the time that she was seen by, Danni Odell MD, at the time that she had cardiac stress testing. Her A-fib is under control today with medications in regard to rate but she raised the question when, Manjeet Gustafson MD, is going to see her. I do not know that question but I will go ahead and request an appointment and send him a note.     On 12/28/2023 the patient is back into atrial fibrillation. Dr. Danni Odell, is controlling ventricular response and the patient is on Eliquis with no embolic phenomenon.     On 01/18/2024, no new issues pertinent to this. She continues being monitored by Dr. Odell.     On 01/31/2024, her atrial fibrillation remains controlled. Her QT interval is still applicable and normal. QT interval has not been modified by Kisqali administration. She remains on magnesium  supplement.    On 03/08/2024 she remains in atrial fibrillation clinically with controlled ventricular response, no congestive heart failure, no angina. She remains on anticoagulant. No clinical bleeding.    On 04/10/2024, she remains in atrial fibrillation with controlled ventricular response. She remains on metoprolol at this time and Eliquis.    On 04/24/2024, I took the risk last week to discontinue Eliquis because in the package insert, this medicine also can produce hepatitis. Unfortunately, her liver function test has not improved today. My expectation is that maybe in the next couple of weeks things will improve. We will continue monitoring this. The patient is aware that in atrial fibrillation, she could have an embolic stroke. Hopefully that will not be the case. She will remain on aspirin.    On 05/07/2024 given the improvement in her liver function test and having no clue in regard to what medicine or factor was contributing to her liver test abnormality I asked her to resume her Eliquis at the previous dose of 5 mg twice a day.    On 06/04/2024, her atrial fibrillation is controlled with metoprolol and she remains on anticoagulant.      6. Grade 3 lymphedema in the left upper extremity    I went and made an urgent referral to the Lymphedema Clinic today to see if further bandage and drainage of this and massage would be helpful to her in the long run to control the problem. I still advised her to be extremely careful in regard to any injury or lesions in the skin of the left upper extremity to minimize any potentiality of cellulitis. In the long run if she has any episodes she will need to be back on antibiotics right away.   On 09/14/2022 the patient's lymphedema in the left upper extremity continues. She already has an appointment to be seen by the lymphedema clinic, she will require a new sleeve and massage and interventional therapy for this. She is aware that she needs to avoid any trauma in the  left upper extremity to minimize any cellulitis. I strongly believe as posted before that septic arthritis is part of her lymphedema issues.   On 10/25/2022 her lymphedema in the left upper extremity is very much resolved with the sleeve and all of the manipulation that she has had in the lymphedema clinic. I advised her to be very prudent in regard doing any nicking in the skin and damaging the skin pertinent to the left upper extremity to minimize cellulitis and potentiality for further problems.   On 12/28/2022 no lymphedema in either extremity. No need for sleeve use at this time.   On 03/29/2023 the patient has no leftover lymphedema in either extremity.  6/27/2023: Stable.   Lymphedema in the left upper extremity on 09/27/2023 is grade 1 and has not required any sleeve usage or elastic bandage at this time.     On 12/28/2023 her lymphedema in the left upper extremity is very minimal grade 1 at this point. No secondary.    On 01/18/2024, probably a grade 1 lymphedema in the left upper extremity.    On 01/31/2024, her lymphedema in the left upper extremity is gone.    On 03/08/2024 no lymphedema in either extremity at this time.    On 04/10/2024, no evidence of lymphedema in the left upper extremity at this time.    On 04/24/2024, no lymphedema in the left upper extremity.    On 05/07/2024 since initiation of prednisone the lymphedema in the left upper extremity became worse. There is no palpable left axillary adenopathy. She is wearing her elastic support and I encouraged her to continue this for the time being.     On 06/04/2024, lymphedema in the left upper extremity is a grade 1 .        7. Hypertension  In regard to hypertension management I have controlled this before. Now she is taking quite amount of metoprolol. I will give up the management of this and now that she will see Dr. Odell, they will provide the care of this issue. I would not be surprised if the patient in the long run needs to be receiving  another blood pressure medication given the fact that her blood pressure is still marginally elevated today. Taking digoxin, I do not think Lasix or hydrochlorothiazide will be a good choice because of the potential for hypokalemia unless the potassium is replaced and monitored very close. This will probably minimize the potentiality for digoxin toxicity.  On 09/14/2022 her blood pressure is controlled with the metoprolol that is provided by the cardiology team. She was advised again to avoid antiinflammatory medication for pain control.   On 10/25/2022 her blood pressure is under good control and I advised her to continue taking her blood pressure medication and once more I advised against the use of any antiinflammatory medication by oral route.   On 12/28/2022 blood pressure under good control, medications will remain the same.  On 03/29/2023 her blood pressure remains under excellent control.   6/27/2023: Continues to be followed by Cardiology.   On 09/27/2023 her blood pressure is under good control.    On 12/28/2023 her blood pressure has been spiking including to a 190 systolic today in the office of, Danni Odell MD. Amlodipine was given. Her blood pressure today here is going down. The patient is eating exaggerated amount of salt and on top of that she is taking antiinflammatory nonsteroidal medications at least 6 times a week, all of this in conjunction with raised blood pressure. I told her one more time she cannot take antiinflammatory medication, she can only take Tylenol. Dr. Danni Odell, will be adjusting her blood pressure medicine accordingly.     On 01/18/2024, proper blood pressure control at this time.    On 01/31/2024, her blood pressure is in excellent range today, 124/87.    On 03/08/2024 her blood pressure is under good control. Her blood pressure medication will remain the same.     On 04/10/2024, blood pressure under good control at this time.    On 04/24/2024, excellent control of her blood  pressure with amlodipine and metoprolol.    On 05/07/2024 the patient's blood pressure is under good control at this time with metoprolol, hydrochlorothiazide and Norvasc     On 06/04/2024, she is back on metoprolol to control her blood pressure along with a fib.      9. Insomnia  The problem is what to provide her for this problem at this time. There are cardiac issues. I do believe that this patient will be better off at least for the next 6 weeks taking a benzodiazapine or something of this nature more than any other medication. I do not feel compelled to give her gabapentin that actually has not worked for her in the past. Therefore I went ahead and prescribed for her medication in this regard today to take it at bedtime to see if this allows her to sleep properly.   On 10/25/2022 the patient persists having insomnia not sleeping more than 3 hours taking Ambien. I went ahead and discontinued this medicine. I gave her Seroquel 25 mg tablets to take 1 orally nightly. I asked her to call my nurse Priya Gonzalez next week and let us know how she is doing in that arena.   6/27/2023: Patient is no longer on Seroquel. Insomnia is mildly improved.     On 09/27/2023 the patient is no longer requiring any insomnia medication.    On 12/28/2023 not requiring any insomnia medication.     On 01/18/2024, not requiring any medicine for this at this time.    On 01/31/2024, requiring Unisom for insomnia.    On 03/08/2024 Unisom will continue to be used for insomnia.    On 04/10/2024, Unisom remains ongoing for insomnia.    On 04/24/2024, Unisom utilized for nighttime insomnia.    On 05/07/2024 her insomnia is treated with Unisom.  On 06/04/2024, the patient is not sleeping too much. Unisom gives her a groggy feeling the next morning. Eventually will need to think about what medicine to use. Obviously, with the presence of liver disease all these medicines utilized for insomnia could have also a negative effect on this  problem.

## 2024-06-25 ENCOUNTER — APPOINTMENT (OUTPATIENT)
Dept: CT IMAGING | Facility: HOSPITAL | Age: 64
End: 2024-06-25
Payer: MEDICARE

## 2024-06-25 ENCOUNTER — HOSPITAL ENCOUNTER (EMERGENCY)
Facility: HOSPITAL | Age: 64
Discharge: HOME | End: 2024-06-25
Attending: EMERGENCY MEDICINE
Payer: MEDICARE

## 2024-06-25 ENCOUNTER — TELEPHONE (OUTPATIENT)
Dept: ONCOLOGY | Facility: CLINIC | Age: 64
End: 2024-06-25
Payer: MEDICARE

## 2024-06-25 VITALS
OXYGEN SATURATION: 97 % | HEIGHT: 66 IN | HEART RATE: 92 BPM | BODY MASS INDEX: 28.45 KG/M2 | WEIGHT: 177 LBS | RESPIRATION RATE: 18 BRPM | DIASTOLIC BLOOD PRESSURE: 101 MMHG | TEMPERATURE: 97.6 F | SYSTOLIC BLOOD PRESSURE: 133 MMHG

## 2024-06-25 DIAGNOSIS — I16.0 HYPERTENSIVE URGENCY: Primary | ICD-10-CM

## 2024-06-25 DIAGNOSIS — R11.0 NAUSEA: ICD-10-CM

## 2024-06-25 DIAGNOSIS — G44.209 ACUTE NON INTRACTABLE TENSION-TYPE HEADACHE: ICD-10-CM

## 2024-06-25 LAB
ALBUMIN SERPL-MCNC: 4.3 G/DL (ref 3.5–5.2)
ALBUMIN/GLOB SERPL: 1.4 G/DL
ALP SERPL-CCNC: 95 U/L (ref 39–117)
ALT SERPL W P-5'-P-CCNC: 41 U/L (ref 1–33)
ANION GAP SERPL CALCULATED.3IONS-SCNC: 12.6 MMOL/L (ref 5–15)
AST SERPL-CCNC: 37 U/L (ref 1–32)
BASOPHILS # BLD AUTO: 0.02 10*3/MM3 (ref 0–0.2)
BASOPHILS NFR BLD AUTO: 0.3 % (ref 0–1.5)
BILIRUB SERPL-MCNC: 0.6 MG/DL (ref 0–1.2)
BUN SERPL-MCNC: 15 MG/DL (ref 8–23)
BUN/CREAT SERPL: 19.5 (ref 7–25)
CALCIUM SPEC-SCNC: 9.6 MG/DL (ref 8.6–10.5)
CHLORIDE SERPL-SCNC: 103 MMOL/L (ref 98–107)
CO2 SERPL-SCNC: 20.4 MMOL/L (ref 22–29)
CREAT SERPL-MCNC: 0.77 MG/DL (ref 0.57–1)
DEPRECATED RDW RBC AUTO: 46 FL (ref 37–54)
EGFRCR SERPLBLD CKD-EPI 2021: 85.2 ML/MIN/1.73
EOSINOPHIL # BLD AUTO: 0.01 10*3/MM3 (ref 0–0.4)
EOSINOPHIL NFR BLD AUTO: 0.2 % (ref 0.3–6.2)
ERYTHROCYTE [DISTWIDTH] IN BLOOD BY AUTOMATED COUNT: 13 % (ref 12.3–15.4)
GLOBULIN UR ELPH-MCNC: 3 GM/DL
GLUCOSE SERPL-MCNC: 119 MG/DL (ref 65–99)
HCT VFR BLD AUTO: 46.1 % (ref 34–46.6)
HGB BLD-MCNC: 15.3 G/DL (ref 12–15.9)
IMM GRANULOCYTES # BLD AUTO: 0.03 10*3/MM3 (ref 0–0.05)
IMM GRANULOCYTES NFR BLD AUTO: 0.5 % (ref 0–0.5)
INR PPP: 1.12 (ref 0.9–1.1)
LYMPHOCYTES # BLD AUTO: 0.46 10*3/MM3 (ref 0.7–3.1)
LYMPHOCYTES NFR BLD AUTO: 7.4 % (ref 19.6–45.3)
MCH RBC QN AUTO: 31.9 PG (ref 26.6–33)
MCHC RBC AUTO-ENTMCNC: 33.2 G/DL (ref 31.5–35.7)
MCV RBC AUTO: 96 FL (ref 79–97)
MONOCYTES # BLD AUTO: 0.21 10*3/MM3 (ref 0.1–0.9)
MONOCYTES NFR BLD AUTO: 3.4 % (ref 5–12)
NEUTROPHILS NFR BLD AUTO: 5.47 10*3/MM3 (ref 1.7–7)
NEUTROPHILS NFR BLD AUTO: 88.2 % (ref 42.7–76)
NRBC BLD AUTO-RTO: 0 /100 WBC (ref 0–0.2)
PLATELET # BLD AUTO: 149 10*3/MM3 (ref 140–450)
PMV BLD AUTO: 8.7 FL (ref 6–12)
POTASSIUM SERPL-SCNC: 4.1 MMOL/L (ref 3.5–5.2)
PROT SERPL-MCNC: 7.3 G/DL (ref 6–8.5)
PROTHROMBIN TIME: 14.6 SECONDS (ref 11.7–14.2)
QT INTERVAL: 375 MS
QTC INTERVAL: 462 MS
RBC # BLD AUTO: 4.8 10*6/MM3 (ref 3.77–5.28)
SODIUM SERPL-SCNC: 136 MMOL/L (ref 136–145)
TROPONIN T SERPL HS-MCNC: <6 NG/L
WBC NRBC COR # BLD AUTO: 6.2 10*3/MM3 (ref 3.4–10.8)

## 2024-06-25 PROCEDURE — 93010 ELECTROCARDIOGRAM REPORT: CPT | Performed by: INTERNAL MEDICINE

## 2024-06-25 PROCEDURE — 85610 PROTHROMBIN TIME: CPT | Performed by: EMERGENCY MEDICINE

## 2024-06-25 PROCEDURE — 70450 CT HEAD/BRAIN W/O DYE: CPT

## 2024-06-25 PROCEDURE — 25010000002 PROCHLORPERAZINE 10 MG/2ML SOLUTION: Performed by: EMERGENCY MEDICINE

## 2024-06-25 PROCEDURE — 96375 TX/PRO/DX INJ NEW DRUG ADDON: CPT

## 2024-06-25 PROCEDURE — 80053 COMPREHEN METABOLIC PANEL: CPT | Performed by: EMERGENCY MEDICINE

## 2024-06-25 PROCEDURE — 96374 THER/PROPH/DIAG INJ IV PUSH: CPT

## 2024-06-25 PROCEDURE — 84484 ASSAY OF TROPONIN QUANT: CPT | Performed by: EMERGENCY MEDICINE

## 2024-06-25 PROCEDURE — 99284 EMERGENCY DEPT VISIT MOD MDM: CPT

## 2024-06-25 PROCEDURE — 25010000002 LABETALOL 5 MG/ML SOLUTION: Performed by: EMERGENCY MEDICINE

## 2024-06-25 PROCEDURE — 25010000002 ONDANSETRON PER 1 MG: Performed by: EMERGENCY MEDICINE

## 2024-06-25 PROCEDURE — 36415 COLL VENOUS BLD VENIPUNCTURE: CPT

## 2024-06-25 PROCEDURE — 85025 COMPLETE CBC W/AUTO DIFF WBC: CPT | Performed by: EMERGENCY MEDICINE

## 2024-06-25 PROCEDURE — 93005 ELECTROCARDIOGRAM TRACING: CPT | Performed by: EMERGENCY MEDICINE

## 2024-06-25 RX ORDER — ONDANSETRON 2 MG/ML
4 INJECTION INTRAMUSCULAR; INTRAVENOUS ONCE
Status: COMPLETED | OUTPATIENT
Start: 2024-06-25 | End: 2024-06-25

## 2024-06-25 RX ORDER — LABETALOL HYDROCHLORIDE 5 MG/ML
20 INJECTION, SOLUTION INTRAVENOUS ONCE
Status: COMPLETED | OUTPATIENT
Start: 2024-06-25 | End: 2024-06-25

## 2024-06-25 RX ORDER — PROCHLORPERAZINE EDISYLATE 5 MG/ML
10 INJECTION INTRAMUSCULAR; INTRAVENOUS ONCE
Status: COMPLETED | OUTPATIENT
Start: 2024-06-25 | End: 2024-06-25

## 2024-06-25 RX ORDER — SODIUM CHLORIDE 0.9 % (FLUSH) 0.9 %
10 SYRINGE (ML) INJECTION AS NEEDED
Status: DISCONTINUED | OUTPATIENT
Start: 2024-06-25 | End: 2024-06-25 | Stop reason: HOSPADM

## 2024-06-25 RX ORDER — PROCHLORPERAZINE MALEATE 10 MG
10 TABLET ORAL EVERY 6 HOURS PRN
Qty: 10 TABLET | Refills: 0 | Status: SHIPPED | OUTPATIENT
Start: 2024-06-25

## 2024-06-25 RX ADMIN — ONDANSETRON 4 MG: 2 INJECTION INTRAMUSCULAR; INTRAVENOUS at 11:32

## 2024-06-25 RX ADMIN — LABETALOL HYDROCHLORIDE 20 MG: 5 INJECTION, SOLUTION INTRAVENOUS at 11:44

## 2024-06-25 RX ADMIN — METOPROLOL TARTRATE 25 MG: 25 TABLET, FILM COATED ORAL at 13:20

## 2024-06-25 RX ADMIN — PROCHLORPERAZINE EDISYLATE 10 MG: 5 INJECTION INTRAMUSCULAR; INTRAVENOUS at 11:56

## 2024-06-25 NOTE — ED NOTES
Patient to ER via car from home for nausea and hypertension and headache  Patient reports she did take her BP medication this morning    Was here for same a month ago after radiation

## 2024-06-25 NOTE — TELEPHONE ENCOUNTER
Patient called triage line, complained of severe nausea and vomiting and feeling dizzy due to high bllod pressure.  Patient reported blood pressure was 155/124 after taking her BP medications this morning. Patient said shortly after taking her amlodipine patient threw up and said she can't keep anything down. Stated last time this happened  I needed to go to the ER.  I recommenced going to the ER this time around as well due to BP being 155/124 with symptoms.

## 2024-06-25 NOTE — ED PROVIDER NOTES
EMERGENCY DEPARTMENT ENCOUNTER    Room Number:  33/33  Date seen:  6/25/2024  PCP: Elie Dixon MD  Historian: Patient      HPI:  Chief Complaint: Elevated blood pressure  Context: Marylu Georges is a 64 y.o. female who presents to the ED c/o elevated blood pressure with nausea and headache.  Patient states she did take her medications this morning but thinks she may have vomited them up.  The patient does have a history of A-fib and is on metoprolol and Eliquis.  The patient states this happened a month ago after she had radiation.  The patient has a history of breast cancer with recurrence and has not had radiation in 1 month.  The patient denies fevers, chills, focal neurodeficit, chest pain, shortness of breath, abdominal pain, diarrhea, vision change or trouble with speech      PAST MEDICAL HISTORY  Active Ambulatory Problems     Diagnosis Date Noted    Malignant neoplasm of overlapping sites of left breast in female, estrogen receptor positive 01/22/2019    Family history of breast cancer in female 01/22/2019    Malignant neoplasm of overlapping sites of both breasts in female, estrogen receptor positive 11/10/2019    Hypertension 12/17/2019    Encounter for long-term (current) use of other medications 06/22/2021    Drug-induced hepatitis 09/30/2021    Atrial fibrillation 06/27/2022    Lymphedema of upper extremity, bilateral 06/27/2022    New onset atrial fibrillation 06/29/2022    Primary insomnia 10/25/2022    Trigger middle finger of left hand 03/08/2024    Dehydration 06/04/2024     Resolved Ambulatory Problems     Diagnosis Date Noted    Anemia in neoplastic disease 01/22/2019    Axillary mass, right 01/22/2019    Poor venous access 01/30/2019    Fitting and adjustment of vascular catheter 02/01/2019    Syncope 07/30/2019    Nausea & vomiting 07/30/2019    Macrocytosis 07/30/2019    Immunocompromised state due to drug therapy 07/30/2019    Encounter for venous access device care 10/28/2019     Acute radiation dermatitis 12/17/2019    Arthritis of right hip 01/13/2020    Arthritis of left hip 05/26/2021    Transaminitis 06/27/2022    Rash 06/27/2022    Inflammatory arthritis 06/28/2022    Streptococcal arthritis of left ankle 06/27/2022     Past Medical History:   Diagnosis Date    Arthritis     Arthritis of back     Arthritis of neck     Breast cancer     CVA (cerebral vascular accident)     Fracture, fibula     Fracture, finger     Frozen shoulder     Hip arthrosis 01/17    Hip pain     History of fracture of leg 1987    History of radiation therapy     Knee swelling     Limited joint range of motion (ROM)     Low back strain     Periarthritis of shoulder     Rotator cuff syndrome 6/22    Skin sore     Vertigo          REVIEW OF SYSTEMS  All systems reviewed and negative except for those discussed in HPI.       PAST SURGICAL HISTORY  Past Surgical History:   Procedure Laterality Date    AXILLARY LYMPH NODE BIOPSY/EXCISION Right     LYMPH NODE UNDER RIGHT ARM-MALIGNANT (DOUBLE MASTECTOMY)    BREAST BIOPSY Left     MALIGNANT    FRACTURE SURGERY  1987    Leg    HARDWARE REMOVAL      INCISION AND DRAINAGE LEG Left 06/30/2022    Procedure: INCISION AND DRAINAGE left ankle;  Surgeon: Kenneth Tran MD;  Location: [facility];  Service: Orthopedics;  Laterality: Left;    JOINT REPLACEMENT Bilateral mar 2020,july 2021    hips    MASTECTOMY W/ SENTINEL NODE BIOPSY Bilateral 09/16/2019    Procedure: BILATERLA MODIFIED RADICAL MASTECTOMY WITH BILATERAL SENTINEL LYMPH NODE BIOPSY;  Surgeon: Joby Barron Jr., MD;  Location: [facility];  Service: General    TOTAL HIP ARTHROPLASTY Right 03/02/2020    Procedure: RIGHT TOTAL HIP ARTHROPLASTY NATALY NAVIGATION;  Surgeon: Luis M Leonard MD;  Location: [facility];  Service: Orthopedics;  Laterality: Right;    TOTAL HIP ARTHROPLASTY Left 07/20/2021    Procedure: Posterior LEFT TOTAL HIP ARTHROPLASTY NATALY NAVIGATION;  Surgeon: Luis M Leonard MD;   [facility];  Service: Orthopedics;  Laterality: Left;    VENOUS ACCESS DEVICE (PORT) INSERTION Right 02/01/2019    Procedure: INSERTION VENOUS ACCESS DEVICE;  Surgeon: Joby Barron Jr., MD;  Location:  [facility];  Service: General    VENOUS ACCESS DEVICE (PORT) REMOVAL N/A 10/30/2019    Procedure: Mediport Removal;  Surgeon: Joby Barron Jr., MD;  Location: [facility] MAIN OR;  Service: General         FAMILY HISTORY  Family History   Problem Relation Age of Onset    Lung cancer Father     Irritable bowel syndrome Father     Cancer Mother     Breast cancer Mother     Liver disease Mother     Breast cancer Sister 48    Breast cancer Maternal Grandmother     Breast cancer Paternal Grandmother     Breast cancer Maternal Uncle     Malig Hyperthermia Neg Hx          SOCIAL HISTORY  Social History     Socioeconomic History    Marital status:      Spouse name: Cruz    Number of children: 0   Tobacco Use    Smoking status: Never    Smokeless tobacco: Never   Vaping Use    Vaping status: Never Used   Substance and Sexual Activity    Alcohol use: Yes     Comment: occasional    Drug use: No    Sexual activity: Not Currently     Partners: Male     Birth control/protection: Abstinence         ALLERGIES  Benadryl [diphenhydramine], Erythromycin, Levaquin [levofloxacin], and Penicillins      PHYSICAL EXAM  ED Triage Vitals   Temp Heart Rate Resp BP SpO2   06/25/24 1052 06/25/24 1052 06/25/24 1052 06/25/24 1105 06/25/24 1052   97.6 °F (36.4 °C) 87 16 (!) 165/119 99 %      Temp src Heart Rate Source Patient Position BP Location FiO2 (%)   -- -- -- -- --              Physical Exam      GENERAL: 64-year-old female in no acute distress  HENT: NCAT: nares patent: Neck supple  EYES: no scleral icterus  CV: Irregularly irregular in the 90s  RESPIRATORY: normal effort  ABDOMEN: soft, NTND: Bowel sounds positive  MUSCULOSKELETAL: no deformity  NEURO: alert with nonfocal neuro exam  PSYCH:  calm,  cooperative  SKIN: warm, dry    Vital signs and nursing notes reviewed.      LAB RESULTS  Recent Results (from the past 24 hour(s))   CBC Auto Differential    Collection Time: 06/25/24 11:31 AM    Specimen: Blood   Result Value Ref Range    WBC 6.20 3.40 - 10.80 10*3/mm3    RBC 4.80 3.77 - 5.28 10*6/mm3    Hemoglobin 15.3 12.0 - 15.9 g/dL    Hematocrit 46.1 34.0 - 46.6 %    MCV 96.0 79.0 - 97.0 fL    MCH 31.9 26.6 - 33.0 pg    MCHC 33.2 31.5 - 35.7 g/dL    RDW 13.0 12.3 - 15.4 %    RDW-SD 46.0 37.0 - 54.0 fl    MPV 8.7 6.0 - 12.0 fL    Platelets 149 140 - 450 10*3/mm3    Neutrophil % 88.2 (H) 42.7 - 76.0 %    Lymphocyte % 7.4 (L) 19.6 - 45.3 %    Monocyte % 3.4 (L) 5.0 - 12.0 %    Eosinophil % 0.2 (L) 0.3 - 6.2 %    Basophil % 0.3 0.0 - 1.5 %    Immature Grans % 0.5 0.0 - 0.5 %    Neutrophils, Absolute 5.47 1.70 - 7.00 10*3/mm3    Lymphocytes, Absolute 0.46 (L) 0.70 - 3.10 10*3/mm3    Monocytes, Absolute 0.21 0.10 - 0.90 10*3/mm3    Eosinophils, Absolute 0.01 0.00 - 0.40 10*3/mm3    Basophils, Absolute 0.02 0.00 - 0.20 10*3/mm3    Immature Grans, Absolute 0.03 0.00 - 0.05 10*3/mm3    nRBC 0.0 0.0 - 0.2 /100 WBC   ECG 12 Lead Chest Pain    Collection Time: 06/25/24 11:32 AM   Result Value Ref Range    QT Interval 375 ms    QTC Interval 462 ms   Comprehensive Metabolic Panel    Collection Time: 06/25/24 12:15 PM    Specimen: Hand, Right; Blood   Result Value Ref Range    Glucose 119 (H) 65 - 99 mg/dL    BUN 15 8 - 23 mg/dL    Creatinine 0.77 0.57 - 1.00 mg/dL    Sodium 136 136 - 145 mmol/L    Potassium 4.1 3.5 - 5.2 mmol/L    Chloride 103 98 - 107 mmol/L    CO2 20.4 (L) 22.0 - 29.0 mmol/L    Calcium 9.6 8.6 - 10.5 mg/dL    Total Protein 7.3 6.0 - 8.5 g/dL    Albumin 4.3 3.5 - 5.2 g/dL    ALT (SGPT) 41 (H) 1 - 33 U/L    AST (SGOT) 37 (H) 1 - 32 U/L    Alkaline Phosphatase 95 39 - 117 U/L    Total Bilirubin 0.6 0.0 - 1.2 mg/dL    Globulin 3.0 gm/dL    A/G Ratio 1.4 g/dL    BUN/Creatinine Ratio 19.5 7.0 - 25.0    Anion  Gap 12.6 5.0 - 15.0 mmol/L    eGFR 85.2 >60.0 mL/min/1.73   Protime-INR    Collection Time: 06/25/24 12:15 PM    Specimen: Hand, Right; Blood   Result Value Ref Range    Protime 14.6 (H) 11.7 - 14.2 Seconds    INR 1.12 (H) 0.90 - 1.10   High Sensitivity Troponin T    Collection Time: 06/25/24 12:15 PM    Specimen: Hand, Right; Blood   Result Value Ref Range    HS Troponin T <6 <14 ng/L       Ordered the above labs and reviewed the results.        RADIOLOGY  CT Head Without Contrast    Result Date: 6/25/2024  CT HEAD WITHOUT CONTRAST  CLINICAL HISTORY: Headache  TECHNIQUE: CT scan of the head was obtained with 3 mm axial soft tissue algorithm images. No intravenous contrast was administered. Sagittal and coronal reconstructions were obtained.  COMPARISON: CT head dated 5/29/2024.  FINDINGS:   Mild changes of chronic small vessel ischemic phenomena are identified. The ventricles, sulci, and cisterns are age-appropriate. The gray-white matter differentiation is within normal limits. The basal ganglia and thalami are unremarkable. The posterior fossa structures are within normal limits.       No CT evidence for acute intracranial pathology. The etiology of the patient's headaches is not further elucidated on this examination.   Radiation dose reduction techniques were utilized, including automated exposure control and exposure modulation based on body size.  This report was finalized on 6/25/2024 12:45 PM by Dr. Néstor Eaton M.D on Workstation: [id number]       Ordered the above noted radiological studies. Reviewed by me in PACS.            PROCEDURES  Procedures          MEDICATIONS GIVEN IN ER  Medications   sodium chloride 0.9 % flush 10 mL (has no administration in time range)   ondansetron (ZOFRAN) injection 4 mg (4 mg Intravenous Given 6/25/24 1132)   labetalol (NORMODYNE,TRANDATE) injection 20 mg (20 mg Intravenous Given 6/25/24 1144)   prochlorperazine (COMPAZINE) injection 10 mg (10 mg Intravenous Given  6/25/24 1156)   metoprolol tartrate (LOPRESSOR) tablet 25 mg (25 mg Oral Given 6/25/24 1320)             MEDICAL DECISION MAKING, PROGRESS, and CONSULTS    All labs have been independently reviewed by me.  All radiology studies have been reviewed by me and I have also reviewed the radiology report.   EKG's independently viewed and interpreted by me.  Discussion below represents my analysis of pertinent findings related to patient's condition, differential diagnosis, treatment plan and final disposition.      Additional sources:  - Discussed/ obtained information from independent historians: Patient's  who states this happened with 1 month ago    - External (non-ED) record review: I reviewed the patient's ER visit from 1 month ago where she was seen for similar episode of elevated blood pressure and nausea that resolved in the ER    - Chronic or social conditions impacting care: Patient is at home with her     - Shared decision making: After shared decision-making discussion we agreed she is stable for discharge and outpatient follow-up      Orders placed during this visit:  Orders Placed This Encounter   Procedures    CT Head Without Contrast    Comprehensive Metabolic Panel    Protime-INR    High Sensitivity Troponin T    CBC Auto Differential    Monitor Blood Pressure    Continuous Pulse Oximetry    ECG 12 Lead Chest Pain    Telemetry Scan    Telemetry Scan    Insert Peripheral IV    CBC & Differential         Differential diagnosis:  My differential diagnosis includes but is not limited to migraine, intracranial hemorrhage, stroke, temporal arteritis, tension headache, cluster headaches, or infectious.      Independent interpretation of labs, radiology studies, and discussions with consultants:  ED Course as of 06/25/24 1410   Tue Jun 25, 2024   1137 The patient with Zofran and labetalol for her blood pressure of 150s/110s and will obtain EKG, labs and head CT for further evaluation. [GP]   1138  EKG    EKG time: 1132  Rhythm/Rate: A-fib at 91  No Acute Ischemia  Non-Specific ST-T changes    Similar compared to prior on 10/11/2023    Interpreted Contemporaneously by me.  Independently viewed by me     [GP]   1150 Patient is now vomiting again.  I will give her a dose of Compazine but the patient states she is allergic to Benadryl. [GP]   1312 Patient's head CT is negative acute. [GP]   1316 On repeat examination the patient states she feels markedly better.  She states she has a minimal headache and no nausea.  Her blood pressure remains 144/100 and heart rate is 98.  I will give her a dose of her oral Toprol and then recheck her. [GP]   1409 On repeat examination the patient's blood pressure is now 133/100.  She states she is symptom-free.  At this time believe the patient is stable for discharge home and outpatient follow-up.  She states she has an appointment to see her oncologist and cardiologist within the week.  I gave her careful return to the emergency room instructions. [GP]      ED Course User Index  [GP] Andrae Moeller MD               DIAGNOSIS  Final diagnoses:   Hypertensive urgency   Nausea   Acute non intractable tension-type headache         DISPOSITION  DISCHARGE    Patient discharged in stable condition.    Reviewed implications of results, diagnosis, meds, responsibility to follow up, warning signs and symptoms of possible worsening, potential complications and reasons to return to ER, including worsening symptoms, fever or passing out.    Patient/Family voiced understanding of above instructions.    Discussed plan for discharge, as there is no emergent indication for admission.  Pt/family is agreeable and understands need for follow up and repeat testing.  Pt is aware that discharge does not mean that nothing is wrong but it indicates no emergency is present and they must continue care with follow-up as given below or physician of their choice.     FOLLOW-UP  Elie Dixon MD  [id number]  [address] [address]  [phone]    Schedule an appointment as soon as possible for a visit                   Latest Documented Vital Signs:  As of 14:10 EDT  BP- (!) 142/101 HR- 89 Temp- 97.6 °F (36.4 °C) O2 sat- 96%--      --------------------  Please note that portions of this were completed with a voice recognition program.       Note Disclaimer: At [facility], we believe that sharing information builds trust and better relationships. You are receiving this note because you are receiving care at [facility] or recently visited. It is possible you will see health information before a provider has talked with you about it. This kind of information can be easy to misunderstand. To help you fully understand what it means for your health, we urge you to discuss this note with your provider.             Andrae Moeller MD  06/25/24 2634

## 2024-06-25 NOTE — DISCHARGE INSTRUCTIONS
Go home and rest inside today.  Take your medications as prescribed.  Follow-up with your doctors this week as scheduled or return if worse

## 2024-06-26 ENCOUNTER — HOSPITAL ENCOUNTER (OUTPATIENT)
Dept: PET IMAGING | Facility: HOSPITAL | Age: 64
Discharge: HOME | End: 2024-06-26
Admitting: INTERNAL MEDICINE
Payer: MEDICARE

## 2024-06-26 DIAGNOSIS — K71.6 DRUG-INDUCED HEPATITIS: ICD-10-CM

## 2024-06-26 DIAGNOSIS — C50.812 MALIGNANT NEOPLASM OF OVERLAPPING SITES OF LEFT BREAST IN FEMALE, ESTROGEN RECEPTOR POSITIVE: ICD-10-CM

## 2024-06-26 DIAGNOSIS — I48.0 PAROXYSMAL ATRIAL FIBRILLATION: ICD-10-CM

## 2024-06-26 DIAGNOSIS — I89.0 LYMPHEDEMA OF UPPER EXTREMITY, BILATERAL: ICD-10-CM

## 2024-06-26 DIAGNOSIS — E86.0 DEHYDRATION: ICD-10-CM

## 2024-06-26 DIAGNOSIS — F51.01 PRIMARY INSOMNIA: ICD-10-CM

## 2024-06-26 DIAGNOSIS — T50.905A DRUG-INDUCED HEPATITIS: ICD-10-CM

## 2024-06-26 DIAGNOSIS — Z17.0 MALIGNANT NEOPLASM OF OVERLAPPING SITES OF LEFT BREAST IN FEMALE, ESTROGEN RECEPTOR POSITIVE: ICD-10-CM

## 2024-06-26 PROCEDURE — 25510000001 IOPAMIDOL 61 % SOLUTION: Performed by: INTERNAL MEDICINE

## 2024-06-26 PROCEDURE — 70491 CT SOFT TISSUE NECK W/DYE: CPT

## 2024-06-26 PROCEDURE — 0 DIATRIZOATE MEGLUMINE & SODIUM PER 1 ML: Performed by: INTERNAL MEDICINE

## 2024-06-26 PROCEDURE — 71260 CT THORAX DX C+: CPT

## 2024-06-26 PROCEDURE — 74177 CT ABD & PELVIS W/CONTRAST: CPT

## 2024-06-26 RX ADMIN — DIATRIZOATE MEGLUMINE AND DIATRIZOATE SODIUM 30 ML: 660; 100 LIQUID ORAL; RECTAL at 08:36

## 2024-06-26 RX ADMIN — IOPAMIDOL 85 ML: 612 INJECTION, SOLUTION INTRAVENOUS at 08:36

## 2024-07-02 ENCOUNTER — OFFICE VISIT (OUTPATIENT)
Dept: ONCOLOGY | Facility: CLINIC | Age: 64
End: 2024-07-02
Payer: MEDICARE

## 2024-07-02 ENCOUNTER — OFFICE VISIT (OUTPATIENT)
Dept: CARDIOLOGY | Facility: CLINIC | Age: 64
End: 2024-07-02
Payer: MEDICARE

## 2024-07-02 ENCOUNTER — INFUSION (OUTPATIENT)
Dept: ONCOLOGY | Facility: HOSPITAL | Age: 64
End: 2024-07-02
Payer: MEDICARE

## 2024-07-02 ENCOUNTER — LAB (OUTPATIENT)
Dept: LAB | Facility: HOSPITAL | Age: 64
End: 2024-07-02
Payer: MEDICARE

## 2024-07-02 VITALS
DIASTOLIC BLOOD PRESSURE: 84 MMHG | OXYGEN SATURATION: 97 % | TEMPERATURE: 97.6 F | WEIGHT: 178 LBS | RESPIRATION RATE: 16 BRPM | HEART RATE: 63 BPM | HEIGHT: 66 IN | SYSTOLIC BLOOD PRESSURE: 125 MMHG | BODY MASS INDEX: 28.61 KG/M2

## 2024-07-02 VITALS
WEIGHT: 178 LBS | HEIGHT: 66 IN | BODY MASS INDEX: 28.61 KG/M2 | DIASTOLIC BLOOD PRESSURE: 72 MMHG | HEART RATE: 67 BPM | SYSTOLIC BLOOD PRESSURE: 122 MMHG

## 2024-07-02 DIAGNOSIS — I48.0 PAROXYSMAL ATRIAL FIBRILLATION: ICD-10-CM

## 2024-07-02 DIAGNOSIS — E86.0 DEHYDRATION: ICD-10-CM

## 2024-07-02 DIAGNOSIS — Z17.0 MALIGNANT NEOPLASM OF OVERLAPPING SITES OF LEFT BREAST IN FEMALE, ESTROGEN RECEPTOR POSITIVE: Primary | ICD-10-CM

## 2024-07-02 DIAGNOSIS — I89.0 LYMPHEDEMA OF UPPER EXTREMITY, BILATERAL: ICD-10-CM

## 2024-07-02 DIAGNOSIS — C50.812 MALIGNANT NEOPLASM OF OVERLAPPING SITES OF LEFT BREAST IN FEMALE, ESTROGEN RECEPTOR POSITIVE: Primary | ICD-10-CM

## 2024-07-02 DIAGNOSIS — R07.2 PRECORDIAL PAIN: ICD-10-CM

## 2024-07-02 DIAGNOSIS — C50.812 MALIGNANT NEOPLASM OF OVERLAPPING SITES OF LEFT BREAST IN FEMALE, ESTROGEN RECEPTOR POSITIVE: ICD-10-CM

## 2024-07-02 DIAGNOSIS — I48.19 ATRIAL FIBRILLATION, PERSISTENT: ICD-10-CM

## 2024-07-02 DIAGNOSIS — I10 PRIMARY HYPERTENSION: ICD-10-CM

## 2024-07-02 DIAGNOSIS — Z17.0 MALIGNANT NEOPLASM OF OVERLAPPING SITES OF LEFT BREAST IN FEMALE, ESTROGEN RECEPTOR POSITIVE: ICD-10-CM

## 2024-07-02 DIAGNOSIS — K71.6 DRUG-INDUCED HEPATITIS: ICD-10-CM

## 2024-07-02 DIAGNOSIS — T50.905A DRUG-INDUCED HEPATITIS: ICD-10-CM

## 2024-07-02 DIAGNOSIS — F51.01 PRIMARY INSOMNIA: ICD-10-CM

## 2024-07-02 DIAGNOSIS — I51.89 CARDIAC MASS: Primary | ICD-10-CM

## 2024-07-02 LAB
ALBUMIN SERPL-MCNC: 4.3 G/DL (ref 3.5–5.2)
ALBUMIN/GLOB SERPL: 1.5 G/DL
ALP SERPL-CCNC: 84 U/L (ref 39–117)
ALT SERPL W P-5'-P-CCNC: 29 U/L (ref 1–33)
ANION GAP SERPL CALCULATED.3IONS-SCNC: 12 MMOL/L (ref 5–15)
AST SERPL-CCNC: 36 U/L (ref 1–32)
BASOPHILS # BLD AUTO: 0.02 10*3/MM3 (ref 0–0.2)
BASOPHILS NFR BLD AUTO: 0.4 % (ref 0–1.5)
BILIRUB SERPL-MCNC: 0.5 MG/DL (ref 0–1.2)
BUN SERPL-MCNC: 14 MG/DL (ref 8–23)
BUN/CREAT SERPL: 13.7 (ref 7–25)
CALCIUM SPEC-SCNC: 9.9 MG/DL (ref 8.6–10.5)
CANCER AG15-3 SERPL-ACNC: 17.7 U/ML
CHLORIDE SERPL-SCNC: 103 MMOL/L (ref 98–107)
CO2 SERPL-SCNC: 27 MMOL/L (ref 22–29)
CREAT SERPL-MCNC: 1.02 MG/DL (ref 0.57–1)
DEPRECATED RDW RBC AUTO: 48.3 FL (ref 37–54)
EGFRCR SERPLBLD CKD-EPI 2021: 60.8 ML/MIN/1.73
EOSINOPHIL # BLD AUTO: 0.07 10*3/MM3 (ref 0–0.4)
EOSINOPHIL NFR BLD AUTO: 1.3 % (ref 0.3–6.2)
ERYTHROCYTE [DISTWIDTH] IN BLOOD BY AUTOMATED COUNT: 13.3 % (ref 12.3–15.4)
GLOBULIN UR ELPH-MCNC: 2.8 GM/DL
GLUCOSE SERPL-MCNC: 97 MG/DL (ref 65–99)
HCT VFR BLD AUTO: 45.8 % (ref 34–46.6)
HGB BLD-MCNC: 14.9 G/DL (ref 12–15.9)
IMM GRANULOCYTES # BLD AUTO: 0.02 10*3/MM3 (ref 0–0.05)
IMM GRANULOCYTES NFR BLD AUTO: 0.4 % (ref 0–0.5)
LYMPHOCYTES # BLD AUTO: 1.01 10*3/MM3 (ref 0.7–3.1)
LYMPHOCYTES NFR BLD AUTO: 19.2 % (ref 19.6–45.3)
MCH RBC QN AUTO: 31.6 PG (ref 26.6–33)
MCHC RBC AUTO-ENTMCNC: 32.5 G/DL (ref 31.5–35.7)
MCV RBC AUTO: 97 FL (ref 79–97)
MONOCYTES # BLD AUTO: 0.45 10*3/MM3 (ref 0.1–0.9)
MONOCYTES NFR BLD AUTO: 8.6 % (ref 5–12)
NEUTROPHILS NFR BLD AUTO: 3.68 10*3/MM3 (ref 1.7–7)
NEUTROPHILS NFR BLD AUTO: 70.1 % (ref 42.7–76)
NRBC BLD AUTO-RTO: 0 /100 WBC (ref 0–0.2)
PLATELET # BLD AUTO: 157 10*3/MM3 (ref 140–450)
PMV BLD AUTO: 8.6 FL (ref 6–12)
POTASSIUM SERPL-SCNC: 4 MMOL/L (ref 3.5–5.2)
PROT SERPL-MCNC: 7.1 G/DL (ref 6–8.5)
RBC # BLD AUTO: 4.72 10*6/MM3 (ref 3.77–5.28)
SODIUM SERPL-SCNC: 142 MMOL/L (ref 136–145)
WBC NRBC COR # BLD AUTO: 5.25 10*3/MM3 (ref 3.4–10.8)

## 2024-07-02 PROCEDURE — 36415 COLL VENOUS BLD VENIPUNCTURE: CPT

## 2024-07-02 PROCEDURE — 80053 COMPREHEN METABOLIC PANEL: CPT

## 2024-07-02 PROCEDURE — 86300 IMMUNOASSAY TUMOR CA 15-3: CPT | Performed by: INTERNAL MEDICINE

## 2024-07-02 PROCEDURE — 85025 COMPLETE CBC W/AUTO DIFF WBC: CPT

## 2024-07-02 RX ORDER — LAMOTRIGINE 25 MG/1
500 TABLET ORAL ONCE
Status: CANCELLED | OUTPATIENT
Start: 2024-07-05

## 2024-07-02 RX ORDER — LAMOTRIGINE 25 MG/1
500 TABLET ORAL ONCE
OUTPATIENT
Start: 2024-07-19

## 2024-07-02 NOTE — PROGRESS NOTES
Subjective     REASON FOR FOLLOW UP:  1. 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 R BREAST MASS WITH GIANT R AXILLARY ADENOPATHY.  SHE UNDERWENT DOSE DENSE AC, TAXOL, BILATERAL MASTECTOMIES, DRAMATIC NEAR COMPLETE RI, RADIATION THERAPY AND ADJUVANT FEMARA STOPPED ON 12/21 BECAUSE DRUG INDUCED HEPATITIS, SWITCHED TO AROMASIN 2/22: NO SIDE EFFECTS.    3. FAMILY HISTORY OF BREAST CANCER IN MOTHER AND SISTER, SISTER WAS TESTED FOR BRCA AND WAS NEGATIVE.    4. LEFT OVARIAN CYST , IT HAS BEEN PRESENT FOR LONG TIME BUT IS GETTING LARGER, ASYMPTOMATIC, NEED FOR , PELVIC US AND GYN ONC EVal: no need for any intervention                  5. LEFT HIP PAIN SEVERE ARTHRITIS, REAL NEED FOR LEFT HIP REPLACEMENT: PERFORMED 8/21    6. DRUG INDUCED HEPATITIS,  FINALLY STOPPED FEMARA: DRAMATIC IMPROVEMENT AND RESOLUTION.      History of present illness:  On 07/02/2024, this patient returns to the office for follow-up. She has been treated with radiation therapy to the metastasis in the front of the heart, behind the sternum related to her breast cancer, biopsy proven. Also, she has been treated with a tapering down prednisone dose for her liver dysfunction, assuming there is an element of autoimmune hepatitis and actually my thought process was probably correct because her liver enzymes have almost normalized. Furthermore, the patient being an individual who works at [employer] has been doing a lot of PET scans in the process of regular basis. There is no obvious regulation in regard to protection for individuals close to the horses and the machine in regard to radiation exposure and protection. She has a methodology to be able to encounter radiation therapy and I advised her to show the device to the individuals in charge of this to quantitate exposure to radiation. She raised the question if this has something to do in regard to her liver issues. I do not know how to answer that  question. In any event her liver enzymes are dramatically better and the patient has now started on prednisone 5 mg a day with the goal eventually to go to 5 mg every other day and eventually to 2.5 mg every other day.     Now that her liver test is dramatically better, we will be able to proceed with some form of therapy for her breast cancer with metastasis. Faslodex will be initiated as per today or in the next few days once that it is approved.                Past Medical History:   Diagnosis Date    Anemia in neoplastic disease     Arthritis     Arthritis of back     Arthritis of neck     Breast cancer     Left    CVA (cerebral vascular accident)     Encounter for long-term (current) use of other medications 06/22/2021    Fracture, fibula     Fracture, finger     Frozen shoulder     Hip arthrosis 01/17    Hip pain     RIGHT HIP... CYST    History of fracture of leg 1987    History of radiation therapy     LAST TREATMENT      Hypertension     Knee swelling     Limited joint range of motion (ROM)     RIGHT HIP    Low back strain     Periarthritis of shoulder     Rotator cuff syndrome 6/22    Skin sore     OPEN SORE LEFT BREAST    Syncope     Vertigo            Past Surgical History:   Procedure Laterality Date    AXILLARY LYMPH NODE BIOPSY/EXCISION Right     LYMPH NODE UNDER RIGHT ARM-MALIGNANT (DOUBLE MASTECTOMY)    BREAST BIOPSY Left     MALIGNANT    FRACTURE SURGERY  1987    Leg    HARDWARE REMOVAL      INCISION AND DRAINAGE LEG Left 06/30/2022    Procedure: INCISION AND DRAINAGE left ankle;  Surgeon: Kenneth Tran MD;  Location: [facility];  Service: Orthopedics;  Laterality: Left;    JOINT REPLACEMENT Bilateral mar 2020,july 2021    hips    MASTECTOMY W/ SENTINEL NODE BIOPSY Bilateral 09/16/2019    Procedure: BILATERLA MODIFIED RADICAL MASTECTOMY WITH BILATERAL SENTINEL LYMPH NODE BIOPSY;  Surgeon: Joby Barron Jr., MD;  Location: [facility];  Service: General    TOTAL HIP ARTHROPLASTY  Right 03/02/2020    Procedure: RIGHT TOTAL HIP ARTHROPLASTY NATALY NAVIGATION;  Surgeon: Luis M Leonard MD;  Location: [facility] MAIN OR;  Service: Orthopedics;  Laterality: Right;    TOTAL HIP ARTHROPLASTY Left 07/20/2021    Procedure: Posterior LEFT TOTAL HIP ARTHROPLASTY NATALY NAVIGATION;  Surgeon: Luis M Leonard MD;  Location: [facility] MAIN OR;  Service: Orthopedics;  Laterality: Left;    VENOUS ACCESS DEVICE (PORT) INSERTION Right 02/01/2019    Procedure: INSERTION VENOUS ACCESS DEVICE;  Surgeon: Joby Barron Jr., MD;  Location: [facility] OR OSC;  Service: General    VENOUS ACCESS DEVICE (PORT) REMOVAL N/A 10/30/2019    Procedure: Mediport Removal;  Surgeon: Joby Barron Jr., MD;  Location: [facility] OR;  Service: General        Current Outpatient Medications on File Prior to Visit   Medication Sig Dispense Refill    amLODIPine (NORVASC) 2.5 MG tablet Take 1 tablet by mouth Daily. 30 tablet 11    apixaban (ELIQUIS) 5 MG tablet tablet Take 1 tablet by mouth 2 (Two) Times a Day.      famotidine (PEPCID) 20 MG tablet Take 1 tablet by mouth 2 (Two) Times a Day. (Patient taking differently: Take 1 tablet by mouth Daily As Needed.) 60 tablet 2    metoprolol succinate XL (TOPROL-XL) 50 MG 24 hr tablet Take 2 tablets po q am and one tablet po q pm 270 tablet 3    predniSONE (DELTASONE) 10 MG tablet Take 0.5 tablets by mouth Daily. 30 tablet 1    prochlorperazine (COMPAZINE) 10 MG tablet Take 1 tablet by mouth Every 6 (Six) Hours As Needed for Nausea or Vomiting. 10 tablet 0    tiZANidine (ZANAFLEX) 4 MG tablet Take 1 tablet by mouth Every 6 (Six) Hours As Needed for Muscle Spasms.      [DISCONTINUED] digoxin (LANOXIN) 125 MCG tablet Take 1 tablet by mouth Daily.      [DISCONTINUED] polyethylene glycol 17 g packet Take 17 g by mouth 2 (Two) Times a Day As Needed (constipation). (Patient not taking: Reported on 7/2/2024)       Current Facility-Administered Medications on File Prior to Visit   Medication Dose Route  "Frequency Provider Last Rate Last Admin    Chlorhexidine Gluconate Cloth 2 % pads 1 each  1 each Apply externally Take As Directed Luis M Leonard MD            ALLERGIES:    Allergies   Allergen Reactions    Benadryl [Diphenhydramine] Itching     INCREASES BLOOD PRESSURE    Erythromycin GI Intolerance    Levaquin [Levofloxacin] GI Intolerance    Penicillins GI Intolerance        Social History     Socioeconomic History    Marital status:      Spouse name: Cruz    Number of children: 0   Tobacco Use    Smoking status: Never    Smokeless tobacco: Never   Vaping Use    Vaping status: Never Used   Substance and Sexual Activity    Alcohol use: Yes     Comment: occasional    Drug use: No    Sexual activity: Not Currently     Partners: Male     Birth control/protection: Abstinence        Family History   Problem Relation Age of Onset    Lung cancer Father     Irritable bowel syndrome Father     Cancer Mother     Breast cancer Mother     Liver disease Mother     Breast cancer Sister 48    Breast cancer Maternal Grandmother     Breast cancer Paternal Grandmother     Breast cancer Maternal Uncle     Malig Hyperthermia Neg Hx             Objective     Vitals:    07/02/24 1119   BP: 125/84   Pulse: 63   Resp: 16   Temp: 97.6 °F (36.4 °C)   TempSrc: Oral   SpO2: 97%   Weight: 80.7 kg (178 lb)   Height: 167.6 cm (65.98\")   PainSc: 0-No pain             7/2/2024    11:22 AM   Current Status   ECOG score 0     Exam:           GENERAL:  Well-developed, Patient  in no acute distress.   SKIN:  Warm, dry ,NO purpura ,no rash.  HEENT:  Pupils were equal and reactive to light and accomodation, conjunctivae noninjected,  normal visual acuity.   NECK:  Supple with good range of motion; no thyromegaly , no JVD or bruits,.No carotid artery pain, no carotid abnormal pulsation   LYMPHATICS:  No cervical, NO supraclavicular, NO axillary, NO inguinal adenopathies.  CARDIAC   normal rate , regular rhythm, without murmur,NO rubs NO S3 " NO S4   LUNGS: normal breath sounds bilateral, no wheezing, NO rhonchi, NO crackles ,NO rubs.  VASCULAR VENOUS: no cyanosis, NO collateral circulation, NO varicosities, NO edema, NO palpable cords, NO pain,NO erythema, NO pigmentation of the skin.  ABDOMEN:  Soft, NO pain,no hepatomegaly, no splenomegaly,no masses, no ascites, no collateral circulation,no distention.  EXTREMITIES  AND SPINE:  No clubbing, no cyanosis ,no deformities , no pain .No kyphosis,  no pain in spine, no pain in ribs , no pain in pelvic bone.  NEUROLOGICAL:  Patient was awake, alert, oriented to time, person and place.    RECENT LABS:  Lab Results   Component Value Date    WBC 5.25 07/02/2024    HGB 14.9 07/02/2024    HCT 45.8 07/02/2024    MCV 97.0 07/02/2024     07/02/2024       Lab Results   Component Value Date    GLUCOSE 119 (H) 06/25/2024    BUN 15 06/25/2024    CREATININE 0.77 06/25/2024    EGFR 85.2 06/25/2024    BCR 19.5 06/25/2024    K 4.1 06/25/2024    CO2 20.4 (L) 06/25/2024    CALCIUM 9.6 06/25/2024    ALBUMIN 4.3 06/25/2024    BILITOT 0.6 06/25/2024    AST 37 (H) 06/25/2024    ALT 41 (H) 06/25/2024    CT Soft Tissue Neck With Contrast (06/26/2024 08:38)  CT Chest With Contrast Diagnostic (06/26/2024 08:38)  CT Abdomen Pelvis With Contrast (06/26/2024 08:38)    Assessment & Plan    1.  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   For at least 4 years, she has had an in crescendo mass in the left breast that has produced a gigantic tumor that was close to 25 cm in size and is replacing most of the anatomy of the left breast. There are areas of ulceration necrosis and bleeding and the mass is fixed to the chest wall. She has abundant amount of collateral circulation in the left anterior chest wall and it caught my attention the lack of any left axillary adenopathy. She has no lymphedema in the left upper extremity. In the  right breast while in sitting position, no palpable abnormalities are documented. The right breast skin and nipple are normal. When the patient lies flat, there is a palpable mass at 9 o'clock from the nipple that measures probably 4 cm in size, very indistinct in regard to edges and margins. There was no mobility and no areas of tenderness. She has a giant adenopathy in the right axilla that measures close to 9 cm in size, uniform, no fluctuation, nontender, completely fixed to the axillary wall. There is no compromise of the skin.   Patient completed 4 cycles of neoadjuvant AC on 4/12/2019.    Patient completed neoadjuvant weekly Taxol x 12 treatments on 7/19/2019.  9/16/2019 bilateral mastectomy by Dr. Joby Barron with axillary lymph node dissection.  No residual malignancy.  10/30/2019 patient's Mediport was removed in anticipation of radiation.  Radiation therapy under the care of Dr. Marmolejo 10/31/2019-1/13/2020 to the right chest wall.  Started on adjuvant Femara 2.5 mg daily 8/20/2019.  9/21/2020 continues on adjuvant Femara, tolerating it quite well.  Some mild hot flashes and mild arthralgias, all which are tolerable.  The patient returned to the office on 05/24/2021. Since the previous visit the patient proceeded with a PET scan and I have reviewed this with her in the PAC system showing chest wall on the left side area of enlightening SUV activity that is almost 3 cm long x 7 mm wide. It is behind the bone. It is very close to the lower aspect of her heart. The reset of the PET scan discloses no activity. This is also specific in regard to the ovaries and actually an ultrasound of her vagina looking into the ovaries documented an unilocular cyst on the left side with typical features of benignity and a  was completely normal 5.5.   The patient was further reviewed on 07/07/2021 after she has continued her Kisqali and completion of the 1st round of the medicine. Today her EKG disclosed a normal QT  interval and this has been sent to Cardiology to be reported officially. 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 She has completed her radiation therapy to the epicardial right lymph node with no difficulties or side effects.   The patient was further reviewed on 09/30/2021. Patient has recovered well from her left hip replacement. She completed her Kisqali a week ago. Her white count is low today. The hemoglobin is stable. The platelet count is stable. Elevation in her liver function test, SGOT, SGPT noted. The patient is not drinking any alcohol. She is not taking any cholesterol medicine. She is not taking any anti-inflammatory medication and leads me to think that Kisqali is the culprit of this. Given these findings we advised the patient to put the Kisqali on hold until we recheck chemistry profile on weekly basis for the next 3 or 4 weeks. We need to settle these numbers down before she continues the Kisqali. She probably will require dose adjustment at some point. Again, her hepatitis A, B and C serologies are negative.   n regard to her history of breast cancer she was reviewed on 11/19/2021. Since the previous visit the patient has had tumor marker CA 15-3 that has dropped to 20. Radiological assessment of her chest and abdomen CT scan that documents resolution of the epicardial lymphadenopathy in the right hemithorax, no pulmonary nodules or metastasis, no pleural effusion and no liver metastasis. No bone metastasis. Based on this information I do believe that the breast cancer is relatively quiet clinically, biochemically and radiologically and I advised her today to resume her Femara at the dose of 2.5 mg a day. The likelihood of Femara producing liver dysfunction is more than remote.   The patient was further reviewed on 12/29/2021. Recent review of her liver function tests a few days ago documented persistent elevation of her SGOT, SGPT, and alkaline phosphatase. She  has discontinued most of the medicines and I have no other choice but discontinue the Femara. Since then and actually today is the 1st day in many weeks that the SGOT and SGPT and alkaline phosphatase are improving. The patient actually remains asymptomatic in this regard. Her liver is not enlarged. She has no jaundice. She has no rash, no skin lesions and no other new alterations. That is good news. Her white blood count, hemoglobin and platelets remain stable. 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 That is extremely rare. As I posted above, I discussed with all my partners in regard to how many times through their careers prescribing Femara to multiple breast cancers they have seen Femara triggering hepatitis, none of them gave me a positive answer. I reviewed this information in [employer] and it is reported in less than 1% of patients taking this medication. I think we are in front of a rare situation but I want for her to withhold any further Femara treatment for the time being and I want to review her back in 3 weeks. If the liver function tests continue improving or normalize by then maybe we will have the answer once and for all. Raises the question what we will do next and I think the next medicine will be the utilization of Aromasin that has a different chemical component and different methodology for action. I made her aware of that. We are not going to go back in giving her Kisqali under the present circumstances given the confusion and all the issues pertinent to her liver dysfunction.   The patient was further reviewed on 03/07/2022. Since the previous visit the patient has stopped the Femara in 12/2021 and today her liver function tests are completely back to normality. This proves the point that Femara was the culprit phenomenon that I never have seen and  discussed with my partner, Danelle Cespedes MD. He never has seen this neither.   The patient returned on 05/09/2022 with no symptoms of anything in particular besides minimal respiratory allergies and pain in the left hip associated with her previous surgery. Her clinical examination today is very much unremarkable, she looks fantastic. She has no symptoms or signs of breast cancer recurrence. White count, hemoglobin and platelets are normal. Her tumor marker CA 15-3 has remained normal and actually lower than ever and compliance with Aromasin has been 100% with excellent tolerance.   Given the circumstances of this I advised her to remain on Aromasin 25 mg a day and I find no use for this patient to go back onto Kisqali or medicines of this nature.   On 08/16/2022 we reviewed the patient in regard to her history of breast cancer. Clinically she has not had any obvious recurrent disease in the chest wall in the lung anatomy or abdomen or pelvis. She remains on Aromasin adjuvantly and we are waiting to review tumor marker. She has a chest wall that is completely flat due to previous mastectomies and radiation therapy. There is no axillary adenopathies and the patient has in my opinion control of her disease process as far as we can tell. I advised her to remain on Aromasin 25 mg a day. I went ahead and scheduled a visit with us in a few weeks in order to further review radiological analysis that will be discussed below.  On 09/14/2022 the patient has had in the interim a CT scan of the chest, abdomen and pelvis for review personally. Her pulmonary anatomy remains normal, there is no pulmonary congestion, pleural effusions, pericardial effusion, cardiomegaly, hilar or mediastinal adenopathy. There is prepericardiac lymph node measuring almost 1.5 to 2 cm in size that is flat like a small finger that has been present before and has minimally enlarged. If this has anything to do with her previous history of breast  cancer is difficult to state but this has been evaluated before with similarity in regards size and some degree of fluctuation. I think this does not constrain me from thinking with a normal tumor marker of CA 15-3 of 20 during the previous visit or compel to perform either removal or biopsy at this time or proceed with radiological assessment with a PET scan. I do believe that this is not representation of anything in particular. Her liver anatomy and the rest of the bony anatomy, pancreas, kidneys, spleen and retroperitoneum remain unchanged. Given this finding I advised the patient to proceed and continue her Aromasin without the need to add any other medication to her regimen of medication especially in the background of A-fib. I advised her to proceed with reassessment with me in 6 weeks with a repeat CBC, CMP and CA 15-3. In that moment also we will obtain a liquid biopsy for further analysis. Her liver function test has remained normal and Aromasin has not produced any chemical hepatitis. I advised her again to remain on this medicine by itself.   On 10/25/2022 the patient remains on Aromasin. She has no symptoms or signs that would indicate breast cancer recurrence and the latest tumor marker CA 15-3 was 20 two months ago. We have another one pending today. Given these circumstances I advised her to remain on Aromasin for the time being. I find no need for radiological assessment at this time. She will be called at home with the report of her tumor marker.   On 12/28/2022 the patient had no symptoms or signs of breast cancer progression or recurrence on any level. Tolerance to Aromasin is excellent with no side effects and she has 100% compliance on the medication. She was advised to remain on the medication. Her tumor marker CA 15-3 has remained normal. No plans for radiological assessment unless new clinical changes occur or tumor marker changes.   On 03/29/2023 the patient has no symptoms or signs of  breast cancer recurrence. She continues taking her aromatase inhibitor medicine on a routine basis with 100% compliance and no significant side effects from the medication. Clinically she has no evidence of breast cancer recurrence and during the previous visit her CA 15-3 remained normal, another one is pending today that will be discussed with her on the telephone once it becomes available. Given the stability of her clinical picture and the excellent clinical status I advised the patient to remain on her medicine for the time being returning to see us back every 3 months with a CBC, CMP and CA 15-3. I find no need for radiological assessment on her at this point.   6/27/2023: Patient reviewed back today in office. She does not have any clinical findings suggestive of malignancy reoccurrence. She remains on Aromasin daily. Labs reviewed. CBC and CMP are both normal. CA 15-3 is normal at 18.5. Patient encouraged to become more active to help with weight gain and arthritis.   On 09/27/2023 the patient was further reviewed. She has no symptoms or signs of breast cancer recurrence. Her chest wall is unremarkable, the lymphedema in the left upper extremity is a grade 1 at the most controlled and she has no need for any elastic support. She has not developed any cellulitis or infection in the left upper extremity. The patient is taking Aromasin adjuvantly 100% compliance, no evidence of recurrent disease. Normal white count, hemoglobin and platelets pending chemistry profile. We advised her to remain on Aromasin 25 mg a day. She will be returning to see us back in 3 months with repeat CBC, CMP and CA 15-3. No need for radiological assessment on her at this point.    On 12/28/2023 no symptoms or signs of breast cancer recurrence. My conversation with, Danni Odell MD, today took place in regard to the potentiality for pleuritic pain on the right side. Lung auscultation is normal and the patient has no symptoms. This pain  comes and goes very sporadically and is not bothering her on routine basis. Knowing that she has had a minimal pericardial alteration before we opted to proceed with a CT angiogram of the chest that will be done tomorrow and we will review this with the patient at some point on the telephone in the next 48 hours or so.     If we assume that her cancer marker CA 15-3 remains stable the patient will remain on Aromasin and we will review her radiological analysis again. Otherwise plan to review her back in 3 months with repeat CBC, CMP and CA 15-3.     On 01/18/2024, the patient was brought to the office to discuss the findings of the pathological report of her retrosternal mass that documents carcinoma of the breast, metastatic, ER positive, MA positive, HER2 negative. Further analysis of this by Clara has been requested.     On this basis, I do believe that the patient has now officially metastatic breast cancer to different sites, specifically inside of the chest. I do not see any options in regard to surgical resection of this, neither radiation therapy because the heart is behind the tumoral site. Radiation therapy will be highly damaging to her heart, especially receiving anthracycline in the past. She had received Kisqali in the past with tremendous benefit and we will proceed with the management of this medicine at the dose of 200 mg twice a day. In anticipation of the use of this, the patient will have a magnesium supplement every day and some cashews every day and she will proceed with an EKG today to measure QT interval. This will be checked back in a couple of weeks.     The patient will require laboratory assessment every 2 weeks to monitor white count, hemoglobin and platelets in regard to Kisqali utilization.     I went ahead and requested a PET scan to be done as early as next week. The patient will have formal education and consent for Kisqali and the medicine will be delivered to her house to  initiate this at some point probably in the middle of next week.     The patient will have the medication utilization at least every 6-8 weeks, repeat radiological assessment then and then decide how to proceed. We now know that her tumor marker is not useful to follow up on her disease process.    On 01/31/2024, the patient has not had any side effects of Kisqali administration along with Aromasin. Kisqali will continue at the present dose and the present schedule. We will continue rechecking her every couple of weeks and I will review her back in 4 weeks. Today her EKG shows atrial fibrillation with controlled ventricular response and a QT interval that remains stable, unchanged from previous EKG. Formal request in regard to EKG interpretation has been done.    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 She has remained on her Aromasin and Kisqali and she is in the 2nd week of the 2nd batch of Kisqali at this time. She has not encountered any nausea or vomiting. She has not had any cardiac irregularity. She is constipated.     Her clinical examination is very benign. Her white count is low expected from the Kisqali with a normal hemoglobin and a normal platelet count. Her chemistry profile is profoundly abnormal, raises the question if now she has hepatitis induced by the Aromasin. 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 I went ahead and discontinued the Aromasin at this time and allowed her to continue the Kisqali until completion and she will require reassessment with laboratory testing on weekly basis from now on until we see what tendency her liver function test is going to go. If the numbers improve discontinuing the Aromasin the next step will be to deliver her therapy with Faslodex once a month, obviously with typical loading dose first. If the liver function test does not improve we will need to discontinue most of her medicines and reassess the status  and figure out medicine by medicine one by one which is the one that is producing the hepatitis. Again the patient is not drinking any alcohol.    I went ahead and scheduled a CT scan of the chest to be done in 3 weeks from now and review with her in 4 weeks repeating CBC and CMP at that time. Her CA 15-3 has become useless in the follow up of her disease process. In the Next Generation Sequencing of the tissue documented from the chest biopsy unfortunately there is no other actionable mutation.     On 04/10/2024, the patient has been discontinued on Aromasin several weeks ago along with Kisqali 2 weeks ago given the presence of persistency of hepatitis induced by medication. She is not drinking alcohol. She is not taking ibuprofen. She is not taking cholesterol medicine and she is not taking excessive amount of Tylenol. Today, the patient has no jaundice. The liver is not palpable. She has no tenderness in the right upper quadrant and the patient has had some mild improvement in her liver function test as posted above. This is good news. The liver functions were going in crescendo. Now they are going in decrescendo and that is again good news. I went ahead and repeated hepatitis serologies A, B and C. They remain negative. I requested antimitochondrial antibodies given the fact that the patient's mother has primary biliary cirrhosis. I also requested an AARON profile. In the meantime, obviously the real question is how to treat her metastatic breast cancer. I think we need to let her liver cool off before we move into any other intervention. I am planning to present her case in the lung cancer conference to see if any ideas in regard to removal of this tissue is available and also in the breast cancer conference in regard to any other ideas in regard to the treatment of her metastatic breast cancer with chemotherapy administration.     She will be informed about these conversations when they take place.    On  04/24/2024, as I discussed and left a note, in regard to the Multidisciplinary Lung Cancer Conference presentation and the Breast Cancer Conference presentation, the patient was suggested to be seen by Radiation Oncology and she has accomplished this today including the markings in preparation for palliative radiation therapy. This will be started as early as tomorrow or next week. 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     Obviously systemic therapy has been put on hold given the fact that she continues having chemical hepatitis. We have further modified medications including Eliquis thinking that this could also be a culprit. Unfortunately, her liver function test remains abnormal. We have performed anti-mitochondrial antibodies, AARON, hepatitis A, B and C serologies and all this testing has returned being negative. I would not be surprised if we have to have the help of a GI team again. We will delay initiation of systemic therapy until her liver function returns to some normality.    On 05/07/2024 the patient's liver enzymes were reviewed having dramatic improvement after she started prednisone 30 mg a day a week ago after her liver enzymes further rashawn at that point. She has not been drinking any alcohol, neither taking any Tylenol to the point that the prednisone has helped her to subside a lot of her joint pain associated with osteoarthritis. Today her examination is very benign. Again her liver enzymes are almost half of what they were before and I advised her to modify the prednisone to 20 mg a day for 5 days, then 10 mg a day until she returns back in 3 weeks to see us. We will continue monitoring her liver enzymes. Today her white count, hemoglobin and platelets are normal and her alkaline phosphatase and bilirubin remain normal. SGOT and SGPT with dramatic improvement.    She will proceed today with initiation of palliative radiation therapy to the retrosternal mass.     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 Her symptoms have improved but they have not subsided completely. She is drinking better but I am going to go ahead and proceed with IV fluids today, normal saline 1000 mL IV along with Zofran. I went ahead and prescribed Pepcid 20 mg, 1 tablet b.i.d. 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     I would not be surprised if in the long run, we need to send her to be seen by Gastroenterology again.     I went ahead and asked her to proceed with radiological assessment of her chest in 3 weeks from now that will be almost a month after completion of her radiation therapy and review her at that point. Subsequently, she will require some systemic therapy but everything depends on the status of her liver function at that time.     Eventually, I will transfer her care to Kayley Miguel MD upon my detention.    On 07/02/2024, I reviewed the patient in regard to her history of metastatic breast cancer with a large deposit behind the sternum in front of the heart that has received palliative radiation therapy. The patient is completely asymptomatic in this regard and the toxicity from radiation is very much subsided. The patient is not having any pain, any pericardial rubs, any alterations in the sternum. Furthermore, the CT scan of the chest that I have reviewed today and compared with the one that was done in 04/2024 for me shows very substantial improvement in the size of the tumoral lesion, not only width but also length and if we compare the volume it is substantially different by at least 40%  difference. Therefore, in my opinion she achieved significant benefit. Now that her liver function test is better, the patient will be able to initiate Faslodex and this could be utilized for the next 3 months so following her clinically and radiologically. The next appointment that she will have will be with Dr. Miguel in order to review the status.     The patient will be approved for Faslodex. She knows that the first dose is on day 1, second dose is on day 15, third dose is on day 30 and thereafter doses will be on monthly basis.     We are not going to produce any company to the Faslodex until her liver test gets better. Today in fact it is almost back to normal. Please review below.          2. Ovarian cyst  05/17/2021: Her CA 15-3 was minimal elevated in comparison to previous assessment and pelvic ultrasound ordered. t raises the following question.   05/24/2021: This is also specific in regard to the ovaries and actually an ultrasound of her vagina looking into the ovaries documented an unilocular cyst on the left side with typical features of benignity and a  was completely normal 5.5.   6/27/2023: CA 15-3 is normal at 18.5.  On 09/27/2023 no issues pertinent to this. This will remain in observation.    On 12/28/2023 this is asymptomatic. No issues pertinent to this at this time.     On 01/18/2024, no issues pertinent.    On 01/31/2024, her ovarian cyst has not changed radiologically in size. This will be left alone for the time being.    On 03/08/2024 ovarian cyst is not an issue at this time.    On 04/10/2024, the ovarian cyst is asymptomatic. We will leave this alone.    On 05/08/2024 no issues pertinent to this at this time.    This issue is a mute at this point.    On 07/02/2024, this issue is mute.      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   Her liver enzymes are completely normal today. She remains on Aromasin and obviously this patient never will be back on letrozole under any  circumstances. This was documented through liver biopsy and through removal of the medication that triggered quick improvement in her liver function test.   On 09/14/2022 there is no evidence of drug induced hepatitis. Aromasin will be continued. The patient is aware that she never will be able to take Femara.   On 10/25/2022 her liver enzymes are completely normal today. Since discontinuation of Femara her drug induced hepatitis has resolved. This situation was extremely rare.   ON 12/28/2022 no issues pertinent to liver function after discontinuation of Femara months ago.   On 03/29/2023 waiting to review her liver function. All of the abnormalities resolved after stopping letrozole.   6/27/2023: Liver enzymes remain normal.   No issues pertinent to this. She remains is observation.    On 12/28/2023 her liver function test is completely normal today. Aromasin is not producing any issues.    On 01/18/2024, no issues pertinent.    On 01/31/2024, no abnormalities in liver function test related to Femara because this medicine was discontinued time ago. Minimal alteration in liver function test related to Kisqali today with no implications.    On 03/08/2024 her liver function tests are very abnormal today. She is not drinking any alcohol, she is not taking any antiinflammatory medication or excessive Tylenol. My gut feeling is that it is going to be the Aromasin as the offender similar to what the Femara was the offender. If that is the case I will go ahead and stop Aromasin today and repeat laboratory testing on a weekly basis CMP until we see what the trend of her numbers will be. If that is not sufficient to make the liver function test to improve we will need to discontinue the Kisqali and keep working on discontinuation of medicines until we find the culprit.    Planning eventually to replace the Aromasin for Faslodex depending on the circumstances.     On 04/10/2024, the patient again has drug-induced hepatitis.  Aromasin was discontinued. The liver abnormalities continue. Kisqali was discontinued. The liver function tests are finally improving today. The question was the combination of medicines or only 1 medicine and I do not know the answer. I have no way to prove it.    On 04/24/2024, as mentioned above, her chemistry hepatitis remains ongoing. Further testing has not produced any benefit in regard to finding the cause. Modifying medications has not produced any benefit either. She will continue being monitored.    On 06/04/2024, in the struggle with drug-induced hepatitis, she only has improved with the use of prednisone leading me to believe that the patient has a component of autoimmune hepatitis.    On 07/02/2024, her prednisone has produced dramatic improvement in her liver function. She is initiating today the dose of 5 mg every day for the next 2 to 3 weeks and eventually she will go to 5 mg every other day for another 3 weeks and eventually to 2.5 mg every other day. I feel afraid of stopping this medicine on this patient after we have struggled for months in regard to her hepatitis. I was even very close to repeating a liver biopsy in this regard.      4. Septic Arthritis  The patient has developed an episode of septic arthritis in many joints including left shoulder and left ankle. She required antibiotic therapy as per recently. Infectious Disease was involved in this. In fact I discussed the case with them on the telephone. I suggested choosing the PICC line to be placed on the right arm in order to be able to deliver antibiotic therapy according to the proper indication. She completed the PICC line and it was removed last week with no complications or problems. The right upper extremity is completely normal. It is difficult for me to know why she developed the septic arthritis. She is in contact with horses all the time. The pathogen that she had in the joint is very uncommon in my opinion and the patient  had no obvious source including no sinuses, no dental source, no obvious cardiac source, no gastrointestinal or genitourinary source and no skin source. It raises the question if this pathogen could evade to colonic source and I think I feel the obligation for this patient to have at least a CT scan of the abdomen and pelvis and eventually in several months from now consider a colonoscopy. Another consideration could be a Cologuard in some way. At least the patient’s infection seems to be under control. Her joints are still sore today including left shoulder, left ankle. Local inflammatory signs are very much gone. The only area of inflammation that she has in the 1st MP joint on the left hand probably represents osteoarthritis, no more than that. She will remain in observation from this point of view.  On 09/14/2022 the patient has no symptoms that would suggest any further spreading or new development of septic arthritis. My fear was related to the possible seeding of her hip replacement areas by bacteria during the bacteremia that she had not too long ago. It seems that that is not the case. Radiologically speaking I do not see any local inflammation or reaction in any of these anatomical sites. There is perfect symmetry in the hip areas in regard to all her hardware material. Her sedimentation rate is BACK TO normal. Her white count is normal and this process will be watched in absence of any other intervention.  On 10/25/2022 no new episodes of septic arthritis. I measured her sedimentation rate during the previous visit that was down to 9, previously was in the 8-9 category. This means resolution of an inflammatory process altogether.   On 12/28/2022 at this time she is experiencing her typical symptom of osteoarthritis in her hands and hips but no issues pertinent to septic arthritis whatsoever. Deformities in the hands are ongoing due to osteoarthritis with no other issues or problems. No lymphedema in upper  extremities.   On 03/29/2023 no significant sequela from her multiple foci and septic arthritis. What she has now is just typical osteoarthritis symptomatology and she uses Tylenol for this and occasional ibuprofen.   6/27/2023: Patient continues to report intermittent joint discomfort. Continues to manage pain with tylenol and occasional ibuprofen.   No issues pertinent to this.    On 12/28/2023 there is no evidence of recurrence of septic arthritis at this time.    On 01/18/2024, no issues pertinent.    On 01/31/2024, no septic arthritis symptomatology at this point.    On 03/08/2024 no evidence of septic arthritis but now she has a trigger finger middle on the left hand that is becoming a nuisance. She has the significant degenerative changes associated with osteoarthritis in both hands but I think if we correct this trigger finger she will be able to function better. Referral made to be seen by, Junior Delgado MD, Rheumatology.    On 04/10/2024, no evidence of septic arthritis.    On 04/24/2024, no issues in regard to septic arthritis at this time.    On 05/07/2024 the patient has been advised at this point to continue her prednisone after starting the medicine a week ago given her in cresndo liver function test. Today her liver enzymes are 1/2 of what they were a week ago. She will drop her prednisone from 30 to 20 to take for 5 days and thereafter 10 mg a day after she returns back to see us in 10 days.    On 06/04/2024, no issues pertinent.    On 07/02/2024, no issues pertinent.        5. Atrial Fibrillation  The patient developed atrial fibrillation with rapid ventricular response during the hospitalization with septic arthritis. Echocardiogram documented very dilated left atrium. She is now receiving Eliquis, metoprolol, and digoxin. Her ventricular response is controlled today but she remains in AFib. She has requested a followup with Dr. Odell and I went ahead and made her an appointment. 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 If she had a fall and she is taking anticoagulants the only thing that we are going to have is just trouble. She has sold one of the horses. She will sell the other horse very soon and she will remain on land for the time being. Her  is back in town and he is helping her with consecution of groceries and things of this nature under the present circumstances. I advised her to minimize any trauma.  On 09/14/2022 the patient will be seen by Electrophysiology tomorrow in regard to consideration of cardioversion for her AFib. She has discussed this with Dr. Odell and I have reviewed this data. That is perfectly fine from my point of view. I find no form of contraindication from my side of the story if this is the methodology that is chosen to try to revert her to normal sinus rhythm.  On 10/25/2022 the patient is in normal sinus rhythm today. She remains on anticoagulants. She has cardiology appointment tomorrow. I asked her to buy a pulse oximeter and I taught her how to interpret the little wave curve of the pulse oximeter in regard to her heart rate. Typically patients in A-fib have very irregular pulse chaotic and the little wave curves will be continuously changing and besides the pulse rate will be continuously changing depending on the speed of the process. That is very simple to interpret. If se develops that problem I advised her to call her cardiologist.   On 12/28/2022 today she is in normal sinus rhythm by cardiac auscultation. Echocardiogram to be done by Danni Odell MD, in the next few days and further recommendations at that point. We strongly advised the patient about the continuation of Eliquis.   On 03/29/2023 clinically her heart obeys to normal sinus rhythm.   6/27/2023: Patient continues to be followed by Dr. Odell and has a stress test next week with Dr. Goodman. Patient remains on  Eliquis with good tolerance.   On 09/27/2023 the patient has been in A-fib since the time that she was seen by, Danni Odell MD, at the time that she had cardiac stress testing. Her A-fib is under control today with medications in regard to rate but she raised the question when, Manjeet Gustafson MD, is going to see her. I do not know that question but I will go ahead and request an appointment and send him a note.     On 12/28/2023 the patient is back into atrial fibrillation. Dr. Danni Odell, is controlling ventricular response and the patient is on Eliquis with no embolic phenomenon.     On 01/18/2024, no new issues pertinent to this. She continues being monitored by Dr. Odell.     On 01/31/2024, her atrial fibrillation remains controlled. Her QT interval is still applicable and normal. QT interval has not been modified by Kisqali administration. She remains on magnesium supplement.    On 03/08/2024 she remains in atrial fibrillation clinically with controlled ventricular response, no congestive heart failure, no angina. She remains on anticoagulant. No clinical bleeding.    On 04/10/2024, she remains in atrial fibrillation with controlled ventricular response. She remains on metoprolol at this time and Eliquis.    On 04/24/2024, I took the risk last week to discontinue Eliquis because in the package insert, this medicine also can produce hepatitis. Unfortunately, her liver function test has not improved today. My expectation is that maybe in the next couple of weeks things will improve. We will continue monitoring this. The patient is aware that in atrial fibrillation, she could have an embolic stroke. Hopefully that will not be the case. She will remain on aspirin.    On 05/07/2024 given the improvement in her liver function test and having no clue in regard to what medicine or factor was contributing to her liver test abnormality I asked her to resume her Eliquis at the previous dose of 5 mg twice a day.    On  06/04/2024, her atrial fibrillation is controlled with metoprolol and she remains on anticoagulant.    On 07/02/2024, her metoprolol is ongoing. Her Eliquis is ongoing. She is taking Norvasc as well.    Recent emergency room visit due to a very high blood pressure that was controlled with therapy.      6. Grade 3 lymphedema in the left upper extremity    I went and made an urgent referral to the Lymphedema Clinic today to see if further bandage and drainage of this and massage would be helpful to her in the long run to control the problem. I still advised her to be extremely careful in regard to any injury or lesions in the skin of the left upper extremity to minimize any potentiality of cellulitis. In the long run if she has any episodes she will need to be back on antibiotics right away.   On 09/14/2022 the patient's lymphedema in the left upper extremity continues. She already has an appointment to be seen by the lymphedema clinic, she will require a new sleeve and massage and interventional therapy for this. She is aware that she needs to avoid any trauma in the left upper extremity to minimize any cellulitis. I strongly believe as posted before that septic arthritis is part of her lymphedema issues.   On 10/25/2022 her lymphedema in the left upper extremity is very much resolved with the sleeve and all of the manipulation that she has had in the lymphedema clinic. I advised her to be very prudent in regard doing any nicking in the skin and damaging the skin pertinent to the left upper extremity to minimize cellulitis and potentiality for further problems.   On 12/28/2022 no lymphedema in either extremity. No need for sleeve use at this time.   On 03/29/2023 the patient has no leftover lymphedema in either extremity.  6/27/2023: Stable.   Lymphedema in the left upper extremity on 09/27/2023 is grade 1 and has not required any sleeve usage or elastic bandage at this time.     On 12/28/2023 her lymphedema in the  left upper extremity is very minimal grade 1 at this point. No secondary.    On 01/18/2024, probably a grade 1 lymphedema in the left upper extremity.    On 01/31/2024, her lymphedema in the left upper extremity is gone.    On 03/08/2024 no lymphedema in either extremity at this time.    On 04/10/2024, no evidence of lymphedema in the left upper extremity at this time.    On 04/24/2024, no lymphedema in the left upper extremity.    On 05/07/2024 since initiation of prednisone the lymphedema in the left upper extremity became worse. There is no palpable left axillary adenopathy. She is wearing her elastic support and I encouraged her to continue this for the time being.     On 06/04/2024, lymphedema in the left upper extremity is a grade 1 .    On 07/02/2024, lymphedema is minimal in the left upper extremity.        7. Hypertension  In regard to hypertension management I have controlled this before. Now she is taking quite amount of metoprolol. I will give up the management of this and now that she will see Dr. Odell, they will provide the care of this issue. I would not be surprised if the patient in the long run needs to be receiving another blood pressure medication given the fact that her blood pressure is still marginally elevated today. Taking digoxin, I do not think Lasix or hydrochlorothiazide will be a good choice because of the potential for hypokalemia unless the potassium is replaced and monitored very close. This will probably minimize the potentiality for digoxin toxicity.  On 09/14/2022 her blood pressure is controlled with the metoprolol that is provided by the cardiology team. She was advised again to avoid antiinflammatory medication for pain control.   On 10/25/2022 her blood pressure is under good control and I advised her to continue taking her blood pressure medication and once more I advised against the use of any antiinflammatory medication by oral route.   On 12/28/2022 blood pressure under  good control, medications will remain the same.  On 03/29/2023 her blood pressure remains under excellent control.   6/27/2023: Continues to be followed by Cardiology.   On 09/27/2023 her blood pressure is under good control.    On 12/28/2023 her blood pressure has been spiking including to a 190 systolic today in the office of, Danni Odell MD. Amlodipine was given. Her blood pressure today here is going down. The patient is eating exaggerated amount of salt and on top of that she is taking antiinflammatory nonsteroidal medications at least 6 times a week, all of this in conjunction with raised blood pressure. I told her one more time she cannot take antiinflammatory medication, she can only take Tylenol. Dr. Danni Odell, will be adjusting her blood pressure medicine accordingly.     On 01/18/2024, proper blood pressure control at this time.    On 01/31/2024, her blood pressure is in excellent range today, 124/87.    On 03/08/2024 her blood pressure is under good control. Her blood pressure medication will remain the same.     On 04/10/2024, blood pressure under good control at this time.    On 04/24/2024, excellent control of her blood pressure with amlodipine and metoprolol.    On 05/07/2024 the patient's blood pressure is under good control at this time with metoprolol, hydrochlorothiazide and Norvasc     On 06/04/2024, she is back on metoprolol to control her blood pressure along with a fib.    On 07/02/2024, seen by Dr. Odell recently. Medications for blood pressure adjusted. Blood pressure is under good numbers today.      9. Insomnia  The problem is what to provide her for this problem at this time. There are cardiac issues. I do believe that this patient will be better off at least for the next 6 weeks taking a benzodiazapine or something of this nature more than any other medication. I do not feel compelled to give her gabapentin that actually has not worked for her in the past. Therefore I went ahead and  prescribed for her medication in this regard today to take it at bedtime to see if this allows her to sleep properly.   On 10/25/2022 the patient persists having insomnia not sleeping more than 3 hours taking Ambien. I went ahead and discontinued this medicine. I gave her Seroquel 25 mg tablets to take 1 orally nightly. I asked her to call my nurse Priya Gonzalez next week and let us know how she is doing in that arena.   6/27/2023: Patient is no longer on Seroquel. Insomnia is mildly improved.     On 09/27/2023 the patient is no longer requiring any insomnia medication.    On 12/28/2023 not requiring any insomnia medication.     On 01/18/2024, not requiring any medicine for this at this time.    On 01/31/2024, requiring Unisom for insomnia.    On 03/08/2024 Unisom will continue to be used for insomnia.    On 04/10/2024, Unisom remains ongoing for insomnia.    On 04/24/2024, Unisom utilized for nighttime insomnia.    On 05/07/2024 her insomnia is treated with Unisom.  On 06/04/2024, the patient is not sleeping too much. Unisom gives her a groggy feeling the next morning. Eventually will need to think about what medicine to use. Obviously, with the presence of liver disease all these medicines utilized for insomnia could have also a negative effect on this problem.    Not taking any medicine for insomnia at this time.        On 07/02/2024, each one of the numerals have been discussed in detail as above.     The patient will be returning to see Dr. Miguel in a month, receiving her Faslodex dose #1 in a few days, day 15 and day 30. She will have repeat CBC and CMP in 2 weeks and CBC and CMP in 4 weeks.

## 2024-07-02 NOTE — PROGRESS NOTES
Date of Office Visit: 2024  Encounter Provider: Danni Odell MD  Place of Service: [facility] CARDIOLOGY  Patient Name: Marylu Georges  :1960    Chief complaint  Cardio oncology care, cardiac mass, atrial fibrillation, pulmonary hypertension, prolonged QTc    History of Present Illness  Patient is a 61-year-old female with history of hypertension, prior stroke, left-sided breast cancer.  She had a large mass that was neglected for a period of time and became quite gigantic and ulcerated.  By 2019 she was diagnosed with breast cancer.  She had significant improvement with AC therapy in 2019 (total dose 243 mg/m² of Adriamycin) and this was followed by Taxol therapy which is completed on 2019. She subsequently underwent bilateral mastectomy 2019 with axillary node dissection.  She also underwent radiation therapy completed on 2020 and has been on adjuvant Femara since 2019.  She was able to achieve remission with this.  She then presented on 2021 showing some activity on a PET scan in the left side chest wall.  It was appearing to abut the lower aspect of her heart.  Kisquali was initiated.  She was started on magnesium with concerns of QTC prolongation that can be associated with this agent.  On 2021 tumor marker CA 15-3 had dropped and there was resolution lymphadenopathy was noted by CT imaging.  Liver function tests were elevated and Kisquali  and then Femara were discontinued being seen by GI.  She has been treated with Aromasin.    In 2019 she had an echocardiogram in the setting of syncope echocardiogram that showed an ejection fraction of 60% with moderately dilated left atrium and negative saline injection.  I do not see any strain measurements.  Stress perfusion study was negative for ischemia.  Monitor showed few episodes of atrial tachycardia that were quite brief.  There is no residual malignancy,  She then received radiation therapy to  bilateral chest 10/19-1/20.  With findings of mass near the pericardium follow-up echocardiogram was performed on 6/2021 that showed an ejection fraction 57% with GLS -19.9% with normal wall thickness trivial valve regurgitation no pericardial mass was appreciated.  And 6/2022 she was admitted with infectious arthritis discitis and myositis.  She was treated with antibiotics.  While in hospital she developed new onset atrial fibrillation with rapid rates.  Echo showed an ejection fraction of 55% with indeterminate diastolic function small patent foramen ovale with mild pulmonary hypertension with an RV systolic pressure 41 mmHg.  She was placed on Eliquis and metoprolol.  By 9/2022 she underwent synchronized cardioversion to sinus rhythm by Dr Gustafson.  She was seen in follow-up by EP service and while [id number] score is 1 with a history of carcinoma it was felt that her stroke risk was higher and Eliquis was continued.  Echo on 10/2023 showed an ejection fraction 61%, GLS -21.1% LV was mildly dilated with grade 1 diastolic dysfunction, atrial septum was redundant the injection was not performed there was aortic valve thickening without stenosis there is mild valve regurgitation with an RVSP of 39 mmHg.  Overall stable with slight increase in pulmonary pressures.  On July 2023 with mild normal EKG changes stress perfusion study was negative for ischemia.  She had recurrent atrial fibrillation for which he underwent successful electrical cardioversion in October 2023.  In the meanwhile she has been continued on Aromasin.  On 12/2023 she complained of chest discomfort and a CT angiogram of the chest showed anterior pericardial mass extending around the anterior chest with thickening of the pericardium at the base of the mass.  Surgical or radiation related therapy were not felt to be adequate. Kisqali was resumed.  She developed elevated transaminases that resolved after chemotherapy was stopped and started on  steroids.  She started palliative therapy on 5/2021.  She was dehydrated with nausea.  IV fluids were given on 6/4/2024.  In the midst of this she had recurrent atrial fibrillation controlled.  CT scan of the chest abdomen and pelvis anterior pericardial mass was present and slightly smaller.  Patient to study from 4/2024.  Liver was unremarkable.  Large left adnexal cyst increased in size currently.  25 2024 patient was seen in the emergency room with nausea and headaches in the setting of severe hypertension.  Patient was given metoprolol as well as IV labetalol.  Currently she is off all chemotherapy.    Since last visit palpitations have improved has had chronic dyspnea on exertion no chest pain dizziness or edema.  She remains on Eliquis without falls or bleeding.  Blood pressure appears to be stable.  She notes that she has been working around horse related nuclear testing such as she does have horses and this duration.    Past Medical History:   Diagnosis Date    Anemia in neoplastic disease     Arthritis     Arthritis of back     Arthritis of neck     Breast cancer     Left    CVA (cerebral vascular accident)     Encounter for long-term (current) use of other medications 06/22/2021    Fracture, fibula     Fracture, finger     Frozen shoulder     Hip arthrosis 01/17    Hip pain     RIGHT HIP... CYST    History of fracture of leg 1987    History of radiation therapy     LAST TREATMENT      Hypertension     Knee swelling     Limited joint range of motion (ROM)     RIGHT HIP    Low back strain     Periarthritis of shoulder     Rotator cuff syndrome 6/22    Skin sore     OPEN SORE LEFT BREAST    Syncope     Vertigo      Past Surgical History:   Procedure Laterality Date    AXILLARY LYMPH NODE BIOPSY/EXCISION Right     LYMPH NODE UNDER RIGHT ARM-MALIGNANT (DOUBLE MASTECTOMY)    BREAST BIOPSY Left     MALIGNANT    FRACTURE SURGERY  1987    Leg    HARDWARE REMOVAL      INCISION AND DRAINAGE LEG Left  06/30/2022    Procedure: INCISION AND DRAINAGE left ankle;  Surgeon: Kenneth Tran MD;  Location: [facility] MAIN OR;  Service: Orthopedics;  Laterality: Left;    JOINT REPLACEMENT Bilateral mar 2020,july 2021    hips    MASTECTOMY W/ SENTINEL NODE BIOPSY Bilateral 09/16/2019    Procedure: BILATERLA MODIFIED RADICAL MASTECTOMY WITH BILATERAL SENTINEL LYMPH NODE BIOPSY;  Surgeon: Joby Barron Jr., MD;  Location: [facility] MAIN OR;  Service: General    TOTAL HIP ARTHROPLASTY Right 03/02/2020    Procedure: RIGHT TOTAL HIP ARTHROPLASTY NATALY NAVIGATION;  Surgeon: Luis M Leonard MD;  Location: [facility] MAIN OR;  Service: Orthopedics;  Laterality: Right;    TOTAL HIP ARTHROPLASTY Left 07/20/2021    Procedure: Posterior LEFT TOTAL HIP ARTHROPLASTY NATALY NAVIGATION;  Surgeon: Luis M Leonard MD;  Location: [facility] MAIN OR;  Service: Orthopedics;  Laterality: Left;    VENOUS ACCESS DEVICE (PORT) INSERTION Right 02/01/2019    Procedure: INSERTION VENOUS ACCESS DEVICE;  Surgeon: Joby Barron Jr., MD;  Location: [facility] OSC;  Service: General    VENOUS ACCESS DEVICE (PORT) REMOVAL N/A 10/30/2019    Procedure: Mediport Removal;  Surgeon: Joby Barron Jr., MD;  Location: [facility] OR;  Service: General     Outpatient Medications Prior to Visit   Medication Sig Dispense Refill    amLODIPine (NORVASC) 2.5 MG tablet Take 1 tablet by mouth Daily. 30 tablet 11    apixaban (ELIQUIS) 5 MG tablet tablet Take 1 tablet by mouth 2 (Two) Times a Day.      famotidine (PEPCID) 20 MG tablet Take 1 tablet by mouth 2 (Two) Times a Day. (Patient taking differently: Take 1 tablet by mouth Daily As Needed.) 60 tablet 2    metoprolol succinate XL (TOPROL-XL) 50 MG 24 hr tablet Take 2 tablets po q am and one tablet po q pm 270 tablet 3    predniSONE (DELTASONE) 10 MG tablet Take 0.5 tablets by mouth Daily. 30 tablet 1    prochlorperazine (COMPAZINE) 10 MG tablet Take 1 tablet by mouth Every 6 (Six) Hours As Needed for Nausea or Vomiting.  10 tablet 0    tiZANidine (ZANAFLEX) 4 MG tablet Take 1 tablet by mouth Every 6 (Six) Hours As Needed for Muscle Spasms.      polyethylene glycol 17 g packet Take 17 g by mouth 2 (Two) Times a Day As Needed (constipation). (Patient not taking: Reported on 7/2/2024)       Facility-Administered Medications Prior to Visit   Medication Dose Route Frequency Provider Last Rate Last Admin    Chlorhexidine Gluconate Cloth 2 % pads 1 each  1 each Apply externally Take As Directed Luis M Leonard MD           Allergies as of 07/02/2024 - Reviewed 07/02/2024   Allergen Reaction Noted    Benadryl [diphenhydramine] Itching 02/18/2020    Erythromycin GI Intolerance 01/17/2019    Levaquin [levofloxacin] GI Intolerance 03/07/2019    Penicillins GI Intolerance 01/17/2019     Social History     Socioeconomic History    Marital status:      Spouse name: Cruz    Number of children: 0   Tobacco Use    Smoking status: Never    Smokeless tobacco: Never   Vaping Use    Vaping status: Never Used   Substance and Sexual Activity    Alcohol use: Yes     Comment: occasional    Drug use: No    Sexual activity: Not Currently     Partners: Male     Birth control/protection: Abstinence     Family History   Problem Relation Age of Onset    Lung cancer Father     Irritable bowel syndrome Father     Cancer Mother     Breast cancer Mother     Liver disease Mother     Breast cancer Sister 48    Breast cancer Maternal Grandmother     Breast cancer Paternal Grandmother     Breast cancer Maternal Uncle     Malig Hyperthermia Neg Hx      Review of Systems   Constitutional: Positive for weight gain. Negative for chills, fever and weight loss.   Cardiovascular:  Negative for leg swelling.   Respiratory:  Negative for cough, snoring and wheezing.    Hematologic/Lymphatic: Negative for bleeding problem. Does not bruise/bleed easily.   Skin:  Negative for color change.   Musculoskeletal:  Negative for falls, joint pain and myalgias.   Gastrointestinal:  " Positive for nausea. Negative for melena.   Genitourinary:  Negative for hematuria.   Neurological:  Negative for excessive daytime sleepiness.   Psychiatric/Behavioral:  Negative for depression. The patient is not nervous/anxious.         Objective:     Vitals:    07/02/24 0928   BP: 122/72   Pulse: 67   Weight: 80.7 kg (178 lb)   Height: 167.6 cm (66\")     Body mass index is 28.73 kg/m².    Vitals reviewed.   Constitutional:       Appearance: Well-developed.   Eyes:      General: No scleral icterus.        Right eye: No discharge.      Conjunctiva/sclera: Conjunctivae normal.      Pupils: Pupils are equal, round, and reactive to light.   HENT:      Head: Normocephalic.      Nose: Nose normal.   Neck:      Thyroid: No thyromegaly.      Vascular: No JVD.   Pulmonary:      Effort: Pulmonary effort is normal. No respiratory distress.      Breath sounds: Normal breath sounds. No wheezing. No rales.   Cardiovascular:      Normal rate. Irregularly irregular rhythm. Normal S1. Normal S2.       Murmurs: There is no murmur.      No gallop.    Pulses:     Intact distal pulses.      Carotid: 2+ bilaterally.     Radial: 2+ bilaterally.     Femoral: 2+ bilaterally.     Popliteal: 2+ bilaterally.     Dorsalis pedis: 2+ bilaterally.     Posterior tibial: 2+ bilaterally.  Edema:     Peripheral edema absent.   Abdominal:      General: Bowel sounds are normal. There is no distension.      Palpations: Abdomen is soft.      Tenderness: There is no abdominal tenderness. There is no rebound.   Musculoskeletal: Normal range of motion.         General: No tenderness.      Cervical back: Normal range of motion and neck supple. Skin:     General: Skin is warm and dry.      Findings: No erythema or rash.   Neurological:      Mental Status: Alert and oriented to person, place, and time.   Psychiatric:         Behavior: Behavior normal.         Thought Content: Thought content normal.         Judgment: Judgment normal.       Lab Review:   Lab " "Results - Last 18 Months   Lab Units 07/02/24  1106 06/25/24  1131   WBC 10*3/mm3 5.25 6.20   RBC 10*6/mm3 4.72 4.80   HEMOGLOBIN g/dL 14.9 15.3   HEMATOCRIT % 45.8 46.1   MCV fL 97.0 96.0   MCH pg 31.6 31.9   MCHC g/dL 32.5 33.2   RDW % 13.3 13.0   PLATELETS 10*3/mm3 157 149   NEUTROPHIL % % 70.1 88.2*   LYMPHOCYTE % % 19.2* 7.4*   MONOCYTES % % 8.6 3.4*   EOSINOPHIL % % 1.3 0.2*   BASOPHIL % % 0.4 0.3   NEUTROS ABS 10*3/mm3 3.68 5.47   LYMPHS ABS 10*3/mm3 1.01 0.46*   MONOS ABS 10*3/mm3 0.45 0.21   EOS ABS 10*3/mm3 0.07 0.01   BASOS ABS 10*3/mm3 0.02 0.02   RDW-SD fl 48.3 46.0   MPV fL 8.6 8.7         Lab Results - Last 18 Months   Lab Units 06/25/24  1215   HSTROP T ng/L <6     No results for input(s): \"TSH\" in the last 73684 hours.  Lab Results - Last 18 Months   Lab Units 06/25/24  1215 01/15/24  0857   PROTIME Seconds 14.6* 13.3           ECG 12 Lead    Date/Time: 7/2/2024 9:41 AM  Performed by: Danni Odell MD    Authorized by: Danni Odell MD  Comparison: compared with previous ECG   Similar to previous ECG  Comparison to previous ECG: Anterior nonspecific ST-T wave changes are again present noted previously on prior studies  Rhythm: atrial fibrillation  Other findings: non-specific ST-T wave changes    Clinical impression: abnormal EKG            Diagnosis Plan   1. Cardiac mass  ECG 12 Lead      2. Precordial pain  ECG 12 Lead      3. Atrial fibrillation, persistent  ECG 12 Lead      4. Malignant neoplasm of overlapping sites of left breast in female, estrogen receptor positive        5. Paroxysmal atrial fibrillation        6. Primary hypertension          Plan:       1.  Large anterior pericardial mass, presumably metastatic disease.  On chemo radiation therapy.  Patient to see Dr. Dixon later today to decide plan of care with some improvement in the mass following radiation therapy.  2.  Hypertension with recent ER visit for hypertensive urgency.  3.  Abnormal EKG with ST-T wave changes.  Stress " perfusion scan negative for ischemia in July 2023.  Labile changes are present.  Recent CT scan did not show pericardial effusion.  Clinically she is stable without chest pain symptoms.  4.  Paroxysmal atrial fibrillation, status post electrical cardioversion to sinus rhythm x 2.  Now back in atrial fibrillation with a controlled rate.  Had been off Eliquis for several months as it was felt this may have been contributing to elevated liver function test.  It has been resumed about 6 weeks ago.  If in the future she needs to come off Eliquis for prolonged periods of time for anticoagulation becomes a major may need to consider watchman's device.  5.  Metastatic breast cancer.  Felt to be stable and now on Aromasin  6.  Pulmonary hypertension.  Slightly elevated with RVSP of 39 mmHg and resolved on echo dated 1/2024  7.  Elevated liver function tests.  Attributed to chemotherapy.  However noted plans for possible biopsy if remains elevated.  8.  Arthritis, septic.  Treated with antibiotics IV.      Time Spent: I spent 35 minutes caring for Marylu on this date of service. This time includes time spent by me in the following activities: preparing for the visit, reviewing tests, obtaining and/or reviewing a separately obtained history, performing a medically appropriate examination and/or evaluation, counseling and educating the patient/family/caregiver, ordering medications, tests, or procedures, documenting information in the medical record, and independently interpreting results and communicating that information with the patient/family/caregiver.      I spent 3 minutes on the separately reported service of ECG and Echo. This time is not included in the time used to support the E/M service also reported today.        Your medication list            Accurate as of July 2, 2024 11:59 PM. If you have any questions, ask your nurse or doctor.                CHANGE how you take these medications        Instructions Last Dose  Given Next Dose Due   famotidine 20 MG tablet  Commonly known as: PEPCID  What changed:   when to take this  reasons to take this      Take 1 tablet by mouth 2 (Two) Times a Day.              CONTINUE taking these medications        Instructions Last Dose Given Next Dose Due   amLODIPine 2.5 MG tablet  Commonly known as: NORVASC      Take 1 tablet by mouth Daily.       apixaban 5 MG tablet tablet  Commonly known as: ELIQUIS      Take 1 tablet by mouth 2 (Two) Times a Day.       metoprolol succinate XL 50 MG 24 hr tablet  Commonly known as: TOPROL-XL      Take 2 tablets po q am and one tablet po q pm       predniSONE 10 MG tablet  Commonly known as: DELTASONE      Take 0.5 tablets by mouth Daily.       prochlorperazine 10 MG tablet  Commonly known as: COMPAZINE      Take 1 tablet by mouth Every 6 (Six) Hours As Needed for Nausea or Vomiting.       tiZANidine 4 MG tablet  Commonly known as: ZANAFLEX      Take 1 tablet by mouth Every 6 (Six) Hours As Needed for Muscle Spasms.                Patient is no longer taking -.  I corrected the med list to reflect this.  I did not stop these medications.      Dictated utilizing Dragon dictation

## 2024-07-05 ENCOUNTER — INFUSION (OUTPATIENT)
Dept: ONCOLOGY | Facility: HOSPITAL | Age: 64
End: 2024-07-05
Payer: MEDICARE

## 2024-07-05 DIAGNOSIS — C50.812 MALIGNANT NEOPLASM OF OVERLAPPING SITES OF LEFT BREAST IN FEMALE, ESTROGEN RECEPTOR POSITIVE: Primary | ICD-10-CM

## 2024-07-05 DIAGNOSIS — Z17.0 MALIGNANT NEOPLASM OF OVERLAPPING SITES OF LEFT BREAST IN FEMALE, ESTROGEN RECEPTOR POSITIVE: Primary | ICD-10-CM

## 2024-07-05 PROCEDURE — 25010000002 FULVESTRANT PER 25 MG: Performed by: INTERNAL MEDICINE

## 2024-07-05 PROCEDURE — 96402 CHEMO HORMON ANTINEOPL SQ/IM: CPT

## 2024-07-05 RX ORDER — LAMOTRIGINE 25 MG/1
500 TABLET ORAL ONCE
Status: COMPLETED | OUTPATIENT
Start: 2024-07-05 | End: 2024-07-05

## 2024-07-05 RX ADMIN — FULVESTRANT 500 MG: 50 INJECTION INTRAMUSCULAR at 09:48

## 2024-07-10 ENCOUNTER — TELEPHONE (OUTPATIENT)
Dept: ONCOLOGY | Facility: CLINIC | Age: 64
End: 2024-07-10
Payer: MEDICARE

## 2024-07-10 NOTE — TELEPHONE ENCOUNTER
Caller: Marylu Georges    Relationship: Self    Best call back number: [phone]    What is the best time to reach you: ANYTIME    Who are you requesting to speak with (clinical staff, provider,  specific staff member): CLINICAL       What was the call regarding:     STARTED ON FASLODEX AND GOT FIRST SHOT 07/05    WITH UNDERSTANDING FOR LOADING DOSE SUPPOSED TO HAVE EVERY TWO WEEKS    NOT SHOWING SCHEDULED FOR NEXT ONE WHICH SHOULD  BE 07/19    NEEDING TO CLARIFY ON THIS IF NEEDS THIS OR NOT FOR 07/19    NEXT ONE SHOWING SCHEDULED IS NOT UNTIL 08/02    Is it okay if the provider responds through [facility]: YES

## 2024-07-10 NOTE — TELEPHONE ENCOUNTER
Called patient, told her per treatment plan that she will be getting faslodex on 7/19. Message sent to scheduling for appt. Pt v/u

## 2024-07-19 ENCOUNTER — INFUSION (OUTPATIENT)
Dept: ONCOLOGY | Facility: HOSPITAL | Age: 64
End: 2024-07-19
Payer: MEDICARE

## 2024-07-19 ENCOUNTER — LAB (OUTPATIENT)
Dept: LAB | Facility: HOSPITAL | Age: 64
End: 2024-07-19
Payer: MEDICARE

## 2024-07-19 DIAGNOSIS — Z17.0 MALIGNANT NEOPLASM OF OVERLAPPING SITES OF LEFT BREAST IN FEMALE, ESTROGEN RECEPTOR POSITIVE: ICD-10-CM

## 2024-07-19 DIAGNOSIS — C50.812 MALIGNANT NEOPLASM OF OVERLAPPING SITES OF LEFT BREAST IN FEMALE, ESTROGEN RECEPTOR POSITIVE: ICD-10-CM

## 2024-07-19 DIAGNOSIS — Z17.0 MALIGNANT NEOPLASM OF OVERLAPPING SITES OF LEFT BREAST IN FEMALE, ESTROGEN RECEPTOR POSITIVE: Primary | ICD-10-CM

## 2024-07-19 DIAGNOSIS — C50.812 MALIGNANT NEOPLASM OF OVERLAPPING SITES OF LEFT BREAST IN FEMALE, ESTROGEN RECEPTOR POSITIVE: Primary | ICD-10-CM

## 2024-07-19 LAB
BASOPHILS # BLD AUTO: 0.04 10*3/MM3 (ref 0–0.2)
BASOPHILS NFR BLD AUTO: 0.4 % (ref 0–1.5)
DEPRECATED RDW RBC AUTO: 45.5 FL (ref 37–54)
EOSINOPHIL # BLD AUTO: 0.06 10*3/MM3 (ref 0–0.4)
EOSINOPHIL NFR BLD AUTO: 0.6 % (ref 0.3–6.2)
ERYTHROCYTE [DISTWIDTH] IN BLOOD BY AUTOMATED COUNT: 13.5 % (ref 12.3–15.4)
HCT VFR BLD AUTO: 47.8 % (ref 34–46.6)
HGB BLD-MCNC: 16.1 G/DL (ref 12–15.9)
IMM GRANULOCYTES # BLD AUTO: 0.05 10*3/MM3 (ref 0–0.05)
IMM GRANULOCYTES NFR BLD AUTO: 0.5 % (ref 0–0.5)
LYMPHOCYTES # BLD AUTO: 0.73 10*3/MM3 (ref 0.7–3.1)
LYMPHOCYTES NFR BLD AUTO: 7.1 % (ref 19.6–45.3)
MCH RBC QN AUTO: 31.6 PG (ref 26.6–33)
MCHC RBC AUTO-ENTMCNC: 33.7 G/DL (ref 31.5–35.7)
MCV RBC AUTO: 93.7 FL (ref 79–97)
MONOCYTES # BLD AUTO: 0.49 10*3/MM3 (ref 0.1–0.9)
MONOCYTES NFR BLD AUTO: 4.8 % (ref 5–12)
NEUTROPHILS NFR BLD AUTO: 8.9 10*3/MM3 (ref 1.7–7)
NEUTROPHILS NFR BLD AUTO: 86.6 % (ref 42.7–76)
NRBC BLD AUTO-RTO: 0 /100 WBC (ref 0–0.2)
PLATELET # BLD AUTO: 174 10*3/MM3 (ref 140–450)
PMV BLD AUTO: 9.2 FL (ref 6–12)
RBC # BLD AUTO: 5.1 10*6/MM3 (ref 3.77–5.28)
WBC NRBC COR # BLD AUTO: 10.27 10*3/MM3 (ref 3.4–10.8)

## 2024-07-19 PROCEDURE — 96402 CHEMO HORMON ANTINEOPL SQ/IM: CPT

## 2024-07-19 PROCEDURE — 25010000002 FULVESTRANT PER 25 MG: Performed by: INTERNAL MEDICINE

## 2024-07-19 PROCEDURE — 36415 COLL VENOUS BLD VENIPUNCTURE: CPT

## 2024-07-19 PROCEDURE — 85025 COMPLETE CBC W/AUTO DIFF WBC: CPT

## 2024-07-19 RX ORDER — LAMOTRIGINE 25 MG/1
500 TABLET ORAL ONCE
Status: COMPLETED | OUTPATIENT
Start: 2024-07-19 | End: 2024-07-19

## 2024-07-19 RX ADMIN — FULVESTRANT 500 MG: 50 INJECTION INTRAMUSCULAR at 11:30

## 2024-08-01 DIAGNOSIS — Z17.0 MALIGNANT NEOPLASM OF OVERLAPPING SITES OF LEFT BREAST IN FEMALE, ESTROGEN RECEPTOR POSITIVE: Primary | ICD-10-CM

## 2024-08-01 DIAGNOSIS — C50.812 MALIGNANT NEOPLASM OF OVERLAPPING SITES OF LEFT BREAST IN FEMALE, ESTROGEN RECEPTOR POSITIVE: Primary | ICD-10-CM

## 2024-08-01 RX ORDER — LAMOTRIGINE 25 MG/1
500 TABLET ORAL ONCE
Status: CANCELLED | OUTPATIENT
Start: 2024-08-02

## 2024-08-02 ENCOUNTER — INFUSION (OUTPATIENT)
Dept: ONCOLOGY | Facility: HOSPITAL | Age: 64
End: 2024-08-02
Payer: MEDICARE

## 2024-08-02 DIAGNOSIS — Z17.0 MALIGNANT NEOPLASM OF OVERLAPPING SITES OF LEFT BREAST IN FEMALE, ESTROGEN RECEPTOR POSITIVE: Primary | ICD-10-CM

## 2024-08-02 DIAGNOSIS — C50.812 MALIGNANT NEOPLASM OF OVERLAPPING SITES OF LEFT BREAST IN FEMALE, ESTROGEN RECEPTOR POSITIVE: Primary | ICD-10-CM

## 2024-08-02 PROCEDURE — 25010000002 FULVESTRANT PER 25 MG: Performed by: NURSE PRACTITIONER

## 2024-08-02 PROCEDURE — 96402 CHEMO HORMON ANTINEOPL SQ/IM: CPT

## 2024-08-02 RX ORDER — LAMOTRIGINE 25 MG/1
500 TABLET ORAL ONCE
Status: COMPLETED | OUTPATIENT
Start: 2024-08-02 | End: 2024-08-02

## 2024-08-02 RX ADMIN — FULVESTRANT 500 MG: 50 INJECTION INTRAMUSCULAR at 10:40

## 2024-08-05 RX ORDER — APIXABAN 5 MG/1
5 TABLET, FILM COATED ORAL EVERY 12 HOURS
Qty: 60 TABLET | Refills: 3 | Status: SHIPPED | OUTPATIENT
Start: 2024-08-05

## 2024-08-15 DIAGNOSIS — Z17.0 MALIGNANT NEOPLASM OF OVERLAPPING SITES OF LEFT BREAST IN FEMALE, ESTROGEN RECEPTOR POSITIVE: Primary | ICD-10-CM

## 2024-08-15 DIAGNOSIS — C50.812 MALIGNANT NEOPLASM OF OVERLAPPING SITES OF LEFT BREAST IN FEMALE, ESTROGEN RECEPTOR POSITIVE: Primary | ICD-10-CM

## 2024-08-16 ENCOUNTER — LAB (OUTPATIENT)
Dept: LAB | Facility: HOSPITAL | Age: 64
End: 2024-08-16
Payer: MEDICARE

## 2024-08-16 DIAGNOSIS — C50.812 MALIGNANT NEOPLASM OF OVERLAPPING SITES OF LEFT BREAST IN FEMALE, ESTROGEN RECEPTOR POSITIVE: ICD-10-CM

## 2024-08-16 DIAGNOSIS — Z17.0 MALIGNANT NEOPLASM OF OVERLAPPING SITES OF LEFT BREAST IN FEMALE, ESTROGEN RECEPTOR POSITIVE: ICD-10-CM

## 2024-08-16 LAB
ALBUMIN SERPL-MCNC: 4.2 G/DL (ref 3.5–5.2)
ALBUMIN/GLOB SERPL: 1.7 G/DL
ALP SERPL-CCNC: 85 U/L (ref 39–117)
ALT SERPL W P-5'-P-CCNC: 18 U/L (ref 1–33)
ANION GAP SERPL CALCULATED.3IONS-SCNC: 9.7 MMOL/L (ref 5–15)
AST SERPL-CCNC: 26 U/L (ref 1–32)
BASOPHILS # BLD AUTO: 0.04 10*3/MM3 (ref 0–0.2)
BASOPHILS NFR BLD AUTO: 0.8 % (ref 0–1.5)
BILIRUB SERPL-MCNC: 0.5 MG/DL (ref 0–1.2)
BUN SERPL-MCNC: 12 MG/DL (ref 8–23)
BUN/CREAT SERPL: 11.9 (ref 7–25)
CALCIUM SPEC-SCNC: 9.5 MG/DL (ref 8.6–10.5)
CHLORIDE SERPL-SCNC: 107 MMOL/L (ref 98–107)
CO2 SERPL-SCNC: 26.3 MMOL/L (ref 22–29)
CREAT SERPL-MCNC: 1.01 MG/DL (ref 0.57–1)
DEPRECATED RDW RBC AUTO: 47.8 FL (ref 37–54)
EGFRCR SERPLBLD CKD-EPI 2021: 61.5 ML/MIN/1.73
EOSINOPHIL # BLD AUTO: 0.12 10*3/MM3 (ref 0–0.4)
EOSINOPHIL NFR BLD AUTO: 2.5 % (ref 0.3–6.2)
ERYTHROCYTE [DISTWIDTH] IN BLOOD BY AUTOMATED COUNT: 13.2 % (ref 12.3–15.4)
GLOBULIN UR ELPH-MCNC: 2.5 GM/DL
GLUCOSE SERPL-MCNC: 104 MG/DL (ref 65–99)
HCT VFR BLD AUTO: 46.3 % (ref 34–46.6)
HGB BLD-MCNC: 14.7 G/DL (ref 12–15.9)
IMM GRANULOCYTES # BLD AUTO: 0.01 10*3/MM3 (ref 0–0.05)
IMM GRANULOCYTES NFR BLD AUTO: 0.2 % (ref 0–0.5)
LYMPHOCYTES # BLD AUTO: 1.16 10*3/MM3 (ref 0.7–3.1)
LYMPHOCYTES NFR BLD AUTO: 24.4 % (ref 19.6–45.3)
MCH RBC QN AUTO: 30.9 PG (ref 26.6–33)
MCHC RBC AUTO-ENTMCNC: 31.7 G/DL (ref 31.5–35.7)
MCV RBC AUTO: 97.3 FL (ref 79–97)
MONOCYTES # BLD AUTO: 0.41 10*3/MM3 (ref 0.1–0.9)
MONOCYTES NFR BLD AUTO: 8.6 % (ref 5–12)
NEUTROPHILS NFR BLD AUTO: 3.01 10*3/MM3 (ref 1.7–7)
NEUTROPHILS NFR BLD AUTO: 63.5 % (ref 42.7–76)
NRBC BLD AUTO-RTO: 0 /100 WBC (ref 0–0.2)
PLATELET # BLD AUTO: 165 10*3/MM3 (ref 140–450)
PMV BLD AUTO: 8.5 FL (ref 6–12)
POTASSIUM SERPL-SCNC: 4.4 MMOL/L (ref 3.5–5.2)
PROT SERPL-MCNC: 6.7 G/DL (ref 6–8.5)
RBC # BLD AUTO: 4.76 10*6/MM3 (ref 3.77–5.28)
SODIUM SERPL-SCNC: 143 MMOL/L (ref 136–145)
WBC NRBC COR # BLD AUTO: 4.75 10*3/MM3 (ref 3.4–10.8)

## 2024-08-16 PROCEDURE — 85025 COMPLETE CBC W/AUTO DIFF WBC: CPT

## 2024-08-16 PROCEDURE — 80053 COMPREHEN METABOLIC PANEL: CPT

## 2024-08-16 PROCEDURE — 36415 COLL VENOUS BLD VENIPUNCTURE: CPT

## 2024-08-23 ENCOUNTER — TELEPHONE (OUTPATIENT)
Dept: RADIATION ONCOLOGY | Facility: HOSPITAL | Age: 64
End: 2024-08-23
Payer: MEDICARE

## 2024-08-26 ENCOUNTER — OFFICE VISIT (OUTPATIENT)
Dept: RADIATION ONCOLOGY | Facility: HOSPITAL | Age: 64
End: 2024-08-26
Payer: MEDICARE

## 2024-08-26 VITALS
BODY MASS INDEX: 28.42 KG/M2 | WEIGHT: 176 LBS | OXYGEN SATURATION: 97 % | SYSTOLIC BLOOD PRESSURE: 125 MMHG | DIASTOLIC BLOOD PRESSURE: 88 MMHG | HEART RATE: 64 BPM

## 2024-08-26 DIAGNOSIS — C50.812 MALIGNANT NEOPLASM OF OVERLAPPING SITES OF LEFT BREAST IN FEMALE, ESTROGEN RECEPTOR POSITIVE: Primary | ICD-10-CM

## 2024-08-26 DIAGNOSIS — C50.811 MALIGNANT NEOPLASM OF OVERLAPPING SITES OF BOTH BREASTS IN FEMALE, ESTROGEN RECEPTOR POSITIVE: ICD-10-CM

## 2024-08-26 DIAGNOSIS — Z17.0 MALIGNANT NEOPLASM OF OVERLAPPING SITES OF LEFT BREAST IN FEMALE, ESTROGEN RECEPTOR POSITIVE: Primary | ICD-10-CM

## 2024-08-26 DIAGNOSIS — C50.812 MALIGNANT NEOPLASM OF OVERLAPPING SITES OF BOTH BREASTS IN FEMALE, ESTROGEN RECEPTOR POSITIVE: ICD-10-CM

## 2024-08-26 DIAGNOSIS — Z17.0 MALIGNANT NEOPLASM OF OVERLAPPING SITES OF BOTH BREASTS IN FEMALE, ESTROGEN RECEPTOR POSITIVE: ICD-10-CM

## 2024-08-26 PROCEDURE — G0463 HOSPITAL OUTPT CLINIC VISIT: HCPCS | Performed by: RADIOLOGY

## 2024-08-26 NOTE — PROGRESS NOTES
Cancer Staging   Stage IIIB (cT4c, cN3, cM0, G3, ER: Positive, DE: Positive, HER2: Negative)     S:  I had the pleasure of seeing Marylu Georges  [facility].  She is a 64 y.o. female with stage IV breast cancer.  She initially presented with a large neglected right breast cancer in 2019.  She received neoadjuvant chemotherapy  AC x4 and Taxol x12 under the direction of Dr. Dixon at the [facility] and had an excellent response.  On 9/16/2019 she underwent bilateral mastectomies without reconstruction, with Dr. Robby Barron.  Pathology from the right breast was negative without residual disease, the left breast had multiple sites of residual disease, the largest measuring approximately 3.5 cm.  The left axilla had 5 of 5 of 5 lymph nodes positive, and the right right axilla had 5 of 8 lymph nodes  positive.  Extracapsular extension was present. Her estrogen receptors were positive, progestin receptors negative and HER-2/maria eugenia negative.                                She received definitive postoperative radiation therapy to both chest walls, both supraclavicular fossae, with postaxillary boosts, and left incision boost, between the dates 11/31/19 and 1/13/2020.        She then developed metastatic disease with a mass in the right cardiophrenic fat pad in 2021.  She had a PET scan on 5/19/2021 which showed  a 26 x 10 mm hypermetabolic node in the right cardiophrenic fat pad  with an SUV of 6.8.She completed palliative radiation to the anterior pericardial mass on 7/2/21, 27Gy in 9 fractions.   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     She has been on aomasin with Kisqali which has been discontinued due to drug induced hepatitis. She had a CT angiogram on 12/29/24 which showed an irregular heterogeneous anterior pericardial mass whichextends to the anterior chest wall measuring 6.5 cm transversely and onthe coronal series 6.5 x  5.2 cm. There is thickening of the pericardiumalong the base of the mass and adjacent to the mass as well. Theappearance is suspicious for a metastasis or primary malignancy. Thereare enlarged nodes at both epicardial fat pads with the largest on theright measuring 1.4 x 0.9 cm.     She had a biopsy of the retrosternal mass on 1/15/24 which showed documents carcinoma of the breast, metastatic, ER positive, NC 2% HER2 negative.      She had a PET scan on 1/23/24 which showed enlarging, intensely avid anterior pericardial soft tissue massrepresenting patient's known malignancy. There are smaller adjacentcardiophrenic soft tissue nodules and/or lymph nodes which are alsolarger when compared with 9/7/2022 and raise concern for metastaticdisease as well.    Focal uptake within the pubic symphysis with a focus of osteolysisand apparent mild widening which is increased since 9/7/2022. Metastaticdisease would be atypical and underlying synovitis and/or infectioncannot be excluded. Further evaluation with MRI of the pelvis with andwithout contrast MSK protocol is recommended.5.  Bilateral adnexal cystic lesions have increased in size since9/7/2022 as detailed above. Underlying neoplasm cannot be excluded andfurther evaluation with MRI of the pelvis.        She had a CT chest abdomen and pelvis on 4/3/24 which showed a soft tissue massinvolving the pericardium anteriorly consistent with metastatic tumor.It measures up to 1.5 cm in AP diameter and 6.1 cm in transversediameter and 5.5 cm in craniocaudal extent and this shows no change from12/29/2023. Bone windows show no suspicious lesions.      She is referred today for evaluation for possible radiation to the pericardial mass. Kisquali has been discontinued and she is currently off all systemic therapy due to drug induced hepatitis    She completed palliative radiation to the anterior mediastinal/pericardial mass  mass, 30Gy in 10 fx on 5/21/24.    She had a CT chest, abdomen  and pelvis on 6/26/24 which showed a decrease in size of the anterior pericardial mass, approximately5.6 cm x 1.6 cm appears slightly smaller than on the 4/3/2024 study when it measured 6.1 cm x 1.5 cm.     She states she has been doing well.  She did go the ER twice with nausea and vomiting shortly after she finished but this has resolved. She is back on faslodex and tolerating well.             Review of Systems   Constitutional: Negative.    Cardiovascular: Negative.    Musculoskeletal: Negative.    Psychiatric/Behavioral: Negative.         Past Medical History:   Diagnosis Date    Anemia in neoplastic disease     Arthritis     Arthritis of back     Arthritis of neck     Breast cancer     Left    CVA (cerebral vascular accident)     Encounter for long-term (current) use of other medications 06/22/2021    Fracture, fibula     Fracture, finger     Frozen shoulder     Hip arthrosis 01/17    Hip pain     RIGHT HIP... CYST    History of fracture of leg 1987    History of radiation therapy     LAST TREATMENT      Hypertension     Knee swelling     Limited joint range of motion (ROM)     RIGHT HIP    Low back strain     Periarthritis of shoulder     Rotator cuff syndrome 6/22    Skin sore     OPEN SORE LEFT BREAST    Syncope     Vertigo          Past Surgical History:   Procedure Laterality Date    AXILLARY LYMPH NODE BIOPSY/EXCISION Right     LYMPH NODE UNDER RIGHT ARM-MALIGNANT (DOUBLE MASTECTOMY)    BREAST BIOPSY Left     MALIGNANT    FRACTURE SURGERY  1987    Leg    HARDWARE REMOVAL      INCISION AND DRAINAGE LEG Left 06/30/2022    Procedure: INCISION AND DRAINAGE left ankle;  Surgeon: Kenneth Tran MD;  Location: [facility];  Service: Orthopedics;  Laterality: Left;    JOINT REPLACEMENT Bilateral mar 2020,july 2021    hips    MASTECTOMY W/ SENTINEL NODE BIOPSY Bilateral 09/16/2019    Procedure: BILATERLA MODIFIED RADICAL MASTECTOMY WITH BILATERAL SENTINEL LYMPH NODE BIOPSY;  Surgeon: Joby Barron  MD Alisha;  Location: [facility] MAIN OR;  Service: General    TOTAL HIP ARTHROPLASTY Right 03/02/2020    Procedure: RIGHT TOTAL HIP ARTHROPLASTY NATALY NAVIGATION;  Surgeon: Luis M Leonard MD;  Location: [facility] MAIN OR;  Service: Orthopedics;  Laterality: Right;    TOTAL HIP ARTHROPLASTY Left 07/20/2021    Procedure: Posterior LEFT TOTAL HIP ARTHROPLASTY NATALY NAVIGATION;  Surgeon: Luis M Leonard MD;  Location: [facility] MAIN OR;  Service: Orthopedics;  Laterality: Left;    VENOUS ACCESS DEVICE (PORT) INSERTION Right 02/01/2019    Procedure: INSERTION VENOUS ACCESS DEVICE;  Surgeon: Joby Barron Jr., MD;  [facility];  Service: General    VENOUS ACCESS DEVICE (PORT) REMOVAL N/A 10/30/2019    Procedure: Mediport Removal;  Surgeon: Joby Barron Jr., MD;  Location: [facility] MAIN OR;  Service: General         Social History     Socioeconomic History    Marital status:      Spouse name: Cruz    Number of children: 0   Tobacco Use    Smoking status: Never    Smokeless tobacco: Never   Vaping Use    Vaping status: Never Used   Substance and Sexual Activity    Alcohol use: Yes     Comment: occasional    Drug use: No    Sexual activity: Not Currently     Partners: Male     Birth control/protection: Abstinence         Family History   Problem Relation Age of Onset    Lung cancer Father     Irritable bowel syndrome Father     Cancer Mother     Breast cancer Mother     Liver disease Mother     Breast cancer Sister 48    Breast cancer Maternal Grandmother     Breast cancer Paternal Grandmother     Breast cancer Maternal Uncle     Malig Hyperthermia Neg Hx           Objective    Physical Exam  Constitutional:       Appearance: Normal appearance.   Eyes:      Extraocular Movements: Extraocular movements intact.   Pulmonary:      Effort: Pulmonary effort is normal.   Musculoskeletal:         General: Normal range of motion.   Neurological:      General: No focal deficit present.      Mental Status: She is  alert.   Psychiatric:         Mood and Affect: Mood normal.         Behavior: Behavior normal.         Current Outpatient Medications on File Prior to Visit   Medication Sig Dispense Refill    amLODIPine (NORVASC) 2.5 MG tablet Take 1 tablet by mouth Daily. 30 tablet 11    apixaban (Eliquis) 5 MG tablet tablet TAKE ONE TABLET BY MOUTH EVERY 12 HOURS 60 tablet 3    famotidine (PEPCID) 20 MG tablet Take 1 tablet by mouth 2 (Two) Times a Day. (Patient taking differently: Take 1 tablet by mouth Daily As Needed.) 60 tablet 2    metoprolol succinate XL (TOPROL-XL) 50 MG 24 hr tablet Take 2 tablets po q am and one tablet po q pm 270 tablet 3    predniSONE (DELTASONE) 10 MG tablet Take 0.5 tablets by mouth Daily. 30 tablet 1    prochlorperazine (COMPAZINE) 10 MG tablet Take 1 tablet by mouth Every 6 (Six) Hours As Needed for Nausea or Vomiting. 10 tablet 0    tiZANidine (ZANAFLEX) 4 MG tablet Take 1 tablet by mouth Every 6 (Six) Hours As Needed for Muscle Spasms.      [DISCONTINUED] digoxin (LANOXIN) 125 MCG tablet Take 1 tablet by mouth Daily.       Current Facility-Administered Medications on File Prior to Visit   Medication Dose Route Frequency Provider Last Rate Last Admin    Chlorhexidine Gluconate Cloth 2 % pads 1 each  1 each Apply externally Take As Directed Luis M Leonard MD           ALLERGIES:    Allergies   Allergen Reactions    Benadryl [Diphenhydramine] Itching     INCREASES BLOOD PRESSURE    Erythromycin GI Intolerance    Levaquin [Levofloxacin] GI Intolerance    Penicillins GI Intolerance       /88   Pulse 64   Wt 79.8 kg (176 lb)   LMP  (LMP Unknown)   SpO2 97%   BMI 28.42 kg/m²          7/2/2024    11:22 AM   Current Status   ECOG score 0         Assessment & Plan     64 year old female with stage IV breast cancer now 3 months out from palliative radiation to the anterior pericardial metastases.  I have personally reviewed her most recent CT scans which show a slight decrease in size of the  mass.  She is back on faslodex.  She will have regular follow up with her medical oncologist, Dr. Miguel.  I will see her back in one year for follow up.  She knows to call for this appointment.     I personally spent greater than 35 minutes today assessing, managing, discussing and documenting my visit with the patient. That time includes review of records, imaging and pathology reports, obtaining my own history, performing a medically appropriate evaluation, counseling and educating the patient, discussing goals, logistics, alternatives and risks of my recommendations, surveillance options and potential outcomes. It also includes the time documenting the clinical information in the EMR and communicating my recommendations to the other involved physicians.               Thank you very much for allowing me to participate in the care of this very pleasant patient.    Sincerely,      Denise Joyner MD

## 2024-08-30 ENCOUNTER — OFFICE VISIT (OUTPATIENT)
Dept: ONCOLOGY | Facility: CLINIC | Age: 64
End: 2024-08-30
Payer: MEDICARE

## 2024-08-30 ENCOUNTER — INFUSION (OUTPATIENT)
Dept: ONCOLOGY | Facility: HOSPITAL | Age: 64
End: 2024-08-30
Payer: MEDICARE

## 2024-08-30 ENCOUNTER — LAB (OUTPATIENT)
Dept: LAB | Facility: HOSPITAL | Age: 64
End: 2024-08-30
Payer: MEDICARE

## 2024-08-30 VITALS
OXYGEN SATURATION: 95 % | SYSTOLIC BLOOD PRESSURE: 113 MMHG | TEMPERATURE: 97.8 F | HEART RATE: 71 BPM | DIASTOLIC BLOOD PRESSURE: 81 MMHG | WEIGHT: 175.1 LBS | BODY MASS INDEX: 28.28 KG/M2

## 2024-08-30 DIAGNOSIS — Z17.0 MALIGNANT NEOPLASM OF OVERLAPPING SITES OF LEFT BREAST IN FEMALE, ESTROGEN RECEPTOR POSITIVE: Primary | ICD-10-CM

## 2024-08-30 DIAGNOSIS — C50.812 MALIGNANT NEOPLASM OF OVERLAPPING SITES OF LEFT BREAST IN FEMALE, ESTROGEN RECEPTOR POSITIVE: Primary | ICD-10-CM

## 2024-08-30 LAB
ALBUMIN SERPL-MCNC: 4.2 G/DL (ref 3.5–5.2)
ALBUMIN/GLOB SERPL: 1.8 G/DL
ALP SERPL-CCNC: 79 U/L (ref 39–117)
ALT SERPL W P-5'-P-CCNC: 22 U/L (ref 1–33)
ANION GAP SERPL CALCULATED.3IONS-SCNC: 8.2 MMOL/L (ref 5–15)
AST SERPL-CCNC: 26 U/L (ref 1–32)
BASOPHILS # BLD AUTO: 0.03 10*3/MM3 (ref 0–0.2)
BASOPHILS NFR BLD AUTO: 0.5 % (ref 0–1.5)
BILIRUB SERPL-MCNC: 0.5 MG/DL (ref 0–1.2)
BUN SERPL-MCNC: 18 MG/DL (ref 8–23)
BUN/CREAT SERPL: 18 (ref 7–25)
CALCIUM SPEC-SCNC: 9.4 MG/DL (ref 8.6–10.5)
CHLORIDE SERPL-SCNC: 105 MMOL/L (ref 98–107)
CO2 SERPL-SCNC: 26.8 MMOL/L (ref 22–29)
CREAT SERPL-MCNC: 1 MG/DL (ref 0.57–1)
DEPRECATED RDW RBC AUTO: 47 FL (ref 37–54)
EGFRCR SERPLBLD CKD-EPI 2021: 62.3 ML/MIN/1.73
EOSINOPHIL # BLD AUTO: 0.07 10*3/MM3 (ref 0–0.4)
EOSINOPHIL NFR BLD AUTO: 1.1 % (ref 0.3–6.2)
ERYTHROCYTE [DISTWIDTH] IN BLOOD BY AUTOMATED COUNT: 13.2 % (ref 12.3–15.4)
GLOBULIN UR ELPH-MCNC: 2.4 GM/DL
GLUCOSE SERPL-MCNC: 96 MG/DL (ref 65–99)
HCT VFR BLD AUTO: 45.3 % (ref 34–46.6)
HGB BLD-MCNC: 14.4 G/DL (ref 12–15.9)
IMM GRANULOCYTES # BLD AUTO: 0.01 10*3/MM3 (ref 0–0.05)
IMM GRANULOCYTES NFR BLD AUTO: 0.2 % (ref 0–0.5)
LYMPHOCYTES # BLD AUTO: 1.23 10*3/MM3 (ref 0.7–3.1)
LYMPHOCYTES NFR BLD AUTO: 20.2 % (ref 19.6–45.3)
MCH RBC QN AUTO: 30.4 PG (ref 26.6–33)
MCHC RBC AUTO-ENTMCNC: 31.8 G/DL (ref 31.5–35.7)
MCV RBC AUTO: 95.8 FL (ref 79–97)
MONOCYTES # BLD AUTO: 0.45 10*3/MM3 (ref 0.1–0.9)
MONOCYTES NFR BLD AUTO: 7.4 % (ref 5–12)
NEUTROPHILS NFR BLD AUTO: 4.31 10*3/MM3 (ref 1.7–7)
NEUTROPHILS NFR BLD AUTO: 70.6 % (ref 42.7–76)
NRBC BLD AUTO-RTO: 0 /100 WBC (ref 0–0.2)
PLATELET # BLD AUTO: 178 10*3/MM3 (ref 140–450)
PMV BLD AUTO: 8.5 FL (ref 6–12)
POTASSIUM SERPL-SCNC: 4.2 MMOL/L (ref 3.5–5.2)
PROT SERPL-MCNC: 6.6 G/DL (ref 6–8.5)
RBC # BLD AUTO: 4.73 10*6/MM3 (ref 3.77–5.28)
SODIUM SERPL-SCNC: 140 MMOL/L (ref 136–145)
WBC NRBC COR # BLD AUTO: 6.1 10*3/MM3 (ref 3.4–10.8)

## 2024-08-30 PROCEDURE — 85025 COMPLETE CBC W/AUTO DIFF WBC: CPT

## 2024-08-30 PROCEDURE — G2211 COMPLEX E/M VISIT ADD ON: HCPCS | Performed by: INTERNAL MEDICINE

## 2024-08-30 PROCEDURE — G2212 PROLONG OUTPT/OFFICE VIS: HCPCS | Performed by: INTERNAL MEDICINE

## 2024-08-30 PROCEDURE — 99215 OFFICE O/P EST HI 40 MIN: CPT | Performed by: INTERNAL MEDICINE

## 2024-08-30 PROCEDURE — 3074F SYST BP LT 130 MM HG: CPT | Performed by: INTERNAL MEDICINE

## 2024-08-30 PROCEDURE — 3079F DIAST BP 80-89 MM HG: CPT | Performed by: INTERNAL MEDICINE

## 2024-08-30 PROCEDURE — 1126F AMNT PAIN NOTED NONE PRSNT: CPT | Performed by: INTERNAL MEDICINE

## 2024-08-30 PROCEDURE — 36415 COLL VENOUS BLD VENIPUNCTURE: CPT

## 2024-08-30 PROCEDURE — 80053 COMPREHEN METABOLIC PANEL: CPT

## 2024-08-30 PROCEDURE — 25010000002 FULVESTRANT PER 25 MG: Performed by: INTERNAL MEDICINE

## 2024-08-30 PROCEDURE — 96402 CHEMO HORMON ANTINEOPL SQ/IM: CPT

## 2024-08-30 RX ORDER — LAMOTRIGINE 25 MG/1
500 TABLET ORAL ONCE
Status: CANCELLED | OUTPATIENT
Start: 2024-08-30

## 2024-08-30 RX ORDER — LAMOTRIGINE 25 MG/1
500 TABLET ORAL ONCE
Status: COMPLETED | OUTPATIENT
Start: 2024-08-30 | End: 2024-08-30

## 2024-08-30 RX ADMIN — FULVESTRANT 500 MG: 50 INJECTION INTRAMUSCULAR at 11:30

## 2024-08-30 NOTE — PROGRESS NOTES
Subjective   Marylu Georges is a 64 y.o. female.  With metastatic ER positive breast cancer.    History of Present Illness     Patient is a postmenopausal 64-year-old  lady who initially presented with a neglected left breast with axillary lymphadenopathy which was measuring up to 9 cm.  The tumor was fixed at the time of presentation.  She was treated with neoadjuvant Adriamycin and Cytoxan followed by Taxol in 2019 and subsequently underwent adjuvant radiation to the left chest wall and axilla.  Placed on adjuvant Femara.      PET/CT on 5/19/2021 which showed 2.6 x 1 cm enlarged lymph node in the right costophrenic fat pad which was new.  SUV 6.8.  Findings consistent with localized metastatic disease.  No other foci of pathological hypermetabolic some identified in the chest.  7 x 4.8 cm unilocular cyst in the left adnexa most likely arises from the ovary.  Slight increase in size since previous scan when it measured 5.5 cm.  The cyst is photopenic supporting a benign etiology.    Patient was initiated on Ribociclib in May 2021 and Femara continued.  September 2021 it was noted that LFTs were elevated and Ribociclib and Femara were placed on hold.  CT chest performed in November 2021 showed resolution of the cardiophrenic angle lymph node.    Resumed Femara only in November 2021.    Patient was seen again 12/29/2021 and had persistent elevation of LFTs although not any worse compared to November 2021.  In fact LFTs had improved with an ALT of 326 and AST of 167.  Due to persistent elevation of the LFTs Femara was discontinued.    1/25/2022-improvement in LFTs with ALT of 90 and AST of 55.  Patient also had liver biopsy which was showing hepatitis.    3/7/2022-patient was initiated on Aromasin 25 mg daily.    In December 2023 patient complained of intermittent chest pain.  This was further evaluated with a CT angiogram of the chest performed on 12/29/2023.  There was an irregular heterogeneous anterior  pericardial mass which extends into the anterior chest wall measuring 6.5 cm transversely and on coronal series 6.5 x 5.2 cm.  There is thickening of the pericardium along the base of the mass and adjacent to the mass as well.  Appearance is suspicious for metastatic disease versus primary malignancy.  Enlarged nodes at both epicardial fat pads largest measuring 1.4 x 0.9 cm.  No evidence of pulmonary embolism.    1/23/2024-PET/CT with enlarging intensely avid anterior pericardial soft tissue mass representing patient's known malignancy.  Adjacent cardiophrenic soft tissue nodules and lymph nodes which are also larger when compared to September 2022 scan.  This raises concern for metastatic disease.  Scattered bilateral subcentimeter pulmonary nodules.  Focal uptake within the pubic symphysis with a focus of osteolysis and apparent mild widening increased from September 2022.  Metastatic disease would be atypical and underlying synovitis or infection cannot be excluded.    Adnexal cyst has increased in size since September 2022 and now measures 6.2 x 9.5 cm.    1/15/2024 biopsy of the mediastinal mass was consistent with metastatic breast cancer.  ER +81 to 90% strong  KY +2% weak  HER2 negative, score 0    [facility] NGS with ESR 1 mutation.    Ribociclib was resumed in January 2024 and LFTs monitored closely.    In March 2024 LFTs started to spike up and subsequently Ribociclib as well as Aromasin was discontinued.     She completed radiation to the metastatic deposit in front of the pericardium in June 2024.    Initiated Faslodex in July 2024.    6/26/2024-CT of the chest abdomen and pelvis  Slight decrease in size of the anterior pericardial mass  Small right pelvic cyst appears stable when compared to 9/7/2022.  Larger left pelvic cyst measures 10.5 x 6.4 cm which has increased in size since September 2022.  Further evaluation is suggested.  If conservative management is elected then short-term follow-up with CT  pelvis recommended.  Status post bilateral mastectomies and bilateral axillary node dissection.      Interval history  Patient presents to the clinic today accompanied by  her brother.  She continues on Faslodex and scheduled for Faslodex today.  She is tolerating that well and has no new complaints.  8/30/2024 CBC reviewed and within normal limits  CMP with a BUN of 12, creatinine of 1.01, LFTs completely normal with an ALT of 18 and AST of 26.    The following portions of the patient's history were reviewed and updated as appropriate: allergies, current medications, past family history, past medical history, past social history, past surgical history, and problem list.    Past Medical History:   Diagnosis Date    Anemia in neoplastic disease     Arthritis     Arthritis of back     Arthritis of neck     Breast cancer     Left    CVA (cerebral vascular accident)     Encounter for long-term (current) use of other medications 06/22/2021    Fracture, fibula     Fracture, finger     Frozen shoulder     Hip arthrosis 01/17    Hip pain     RIGHT HIP... CYST    History of fracture of leg 1987    History of radiation therapy     LAST TREATMENT      Hypertension     Knee swelling     Limited joint range of motion (ROM)     RIGHT HIP    Low back strain     Periarthritis of shoulder     Rotator cuff syndrome 6/22    Skin sore     OPEN SORE LEFT BREAST    Syncope     Vertigo         Past Surgical History:   Procedure Laterality Date    AXILLARY LYMPH NODE BIOPSY/EXCISION Right     LYMPH NODE UNDER RIGHT ARM-MALIGNANT (DOUBLE MASTECTOMY)    BREAST BIOPSY Left     MALIGNANT    FRACTURE SURGERY  1987    Leg    HARDWARE REMOVAL      INCISION AND DRAINAGE LEG Left 06/30/2022    Procedure: INCISION AND DRAINAGE left ankle;  Surgeon: Kenneth Tran MD;  Location: [facility];  Service: Orthopedics;  Laterality: Left;    JOINT REPLACEMENT Bilateral mar 2020,july 2021    hips    MASTECTOMY W/ SENTINEL NODE BIOPSY Bilateral  2019    Procedure: BILATERLA MODIFIED RADICAL MASTECTOMY WITH BILATERAL SENTINEL LYMPH NODE BIOPSY;  Surgeon: Joby Barron Jr., MD;  Location: [facility] MAIN OR;  Service: General    TOTAL HIP ARTHROPLASTY Right 2020    Procedure: RIGHT TOTAL HIP ARTHROPLASTY NATALY NAVIGATION;  Surgeon: Luis M Leonard MD;  Location: [facility] MAIN OR;  Service: Orthopedics;  Laterality: Right;    TOTAL HIP ARTHROPLASTY Left 2021    Procedure: Posterior LEFT TOTAL HIP ARTHROPLASTY NATALY NAVIGATION;  Surgeon: Luis M Leonard MD;  Location: [facility] MAIN OR;  Service: Orthopedics;  Laterality: Left;    VENOUS ACCESS DEVICE (PORT) INSERTION Right 2019    Procedure: INSERTION VENOUS ACCESS DEVICE;  Surgeon: Joby Barron Jr., MD;  Location: [facility] OR [facility];  Service: General    VENOUS ACCESS DEVICE (PORT) REMOVAL N/A 10/30/2019    Procedure: Mediport Removal;  Surgeon: Joby Barron Jr., MD;  Location: [facility] MAIN OR;  Service: General        Family History   Problem Relation Age of Onset    Lung cancer Father     Irritable bowel syndrome Father     Cancer Mother     Breast cancer Mother     Liver disease Mother     Breast cancer Sister 48    Breast cancer Maternal Grandmother     Breast cancer Paternal Grandmother     Breast cancer Maternal Uncle     Malig Hyperthermia Neg Hx         Social History     Socioeconomic History    Marital status:      Spouse name: Cruz    Number of children: 0   Tobacco Use    Smoking status: Never    Smokeless tobacco: Never   Vaping Use    Vaping status: Never Used   Substance and Sexual Activity    Alcohol use: Yes     Comment: occasional    Drug use: No    Sexual activity: Not Currently     Partners: Male     Birth control/protection: Abstinence        OB History          0    Para   0    Term   0       0    AB   0    Living   0         SAB   0    IAB   0    Ectopic   0    Molar   0    Multiple   0    Live Births   0                 Allergies   Allergen  Reactions    Benadryl [Diphenhydramine] Itching     INCREASES BLOOD PRESSURE    Erythromycin GI Intolerance    Levaquin [Levofloxacin] GI Intolerance    Penicillins GI Intolerance            Review of Systems      Objective   Blood pressure 113/81, pulse 71, temperature 97.8 °F (36.6 °C), temperature source Oral, weight 79.4 kg (175 lb 1.6 oz), SpO2 95%, not currently breastfeeding.   Physical Exam  Breast Exam:    Lab on 08/30/2024   Component Date Value Ref Range Status    WBC 08/30/2024 6.10  3.40 - 10.80 10*3/mm3 Final    RBC 08/30/2024 4.73  3.77 - 5.28 10*6/mm3 Final    Hemoglobin 08/30/2024 14.4  12.0 - 15.9 g/dL Final    Hematocrit 08/30/2024 45.3  34.0 - 46.6 % Final    MCV 08/30/2024 95.8  79.0 - 97.0 fL Final    MCH 08/30/2024 30.4  26.6 - 33.0 pg Final    MCHC 08/30/2024 31.8  31.5 - 35.7 g/dL Final    RDW 08/30/2024 13.2  12.3 - 15.4 % Final    RDW-SD 08/30/2024 47.0  37.0 - 54.0 fl Final    MPV 08/30/2024 8.5  6.0 - 12.0 fL Final    Platelets 08/30/2024 178  140 - 450 10*3/mm3 Final    Neutrophil % 08/30/2024 70.6  42.7 - 76.0 % Final    Lymphocyte % 08/30/2024 20.2  19.6 - 45.3 % Final    Monocyte % 08/30/2024 7.4  5.0 - 12.0 % Final    Eosinophil % 08/30/2024 1.1  0.3 - 6.2 % Final    Basophil % 08/30/2024 0.5  0.0 - 1.5 % Final    Immature Grans % 08/30/2024 0.2  0.0 - 0.5 % Final    Neutrophils, Absolute 08/30/2024 4.31  1.70 - 7.00 10*3/mm3 Final    Lymphocytes, Absolute 08/30/2024 1.23  0.70 - 3.10 10*3/mm3 Final    Monocytes, Absolute 08/30/2024 0.45  0.10 - 0.90 10*3/mm3 Final    Eosinophils, Absolute 08/30/2024 0.07  0.00 - 0.40 10*3/mm3 Final    Basophils, Absolute 08/30/2024 0.03  0.00 - 0.20 10*3/mm3 Final    Immature Grans, Absolute 08/30/2024 0.01  0.00 - 0.05 10*3/mm3 Final    nRBC 08/30/2024 0.0  0.0 - 0.2 /100 WBC Final   Lab on 08/16/2024   Component Date Value Ref Range Status    Glucose 08/16/2024 104 (H)  65 - 99 mg/dL Final    BUN 08/16/2024 12  8 - 23 mg/dL Final    Creatinine  08/16/2024 1.01 (H)  0.57 - 1.00 mg/dL Final    Sodium 08/16/2024 143  136 - 145 mmol/L Final    Potassium 08/16/2024 4.4  3.5 - 5.2 mmol/L Final    Chloride 08/16/2024 107  98 - 107 mmol/L Final    CO2 08/16/2024 26.3  22.0 - 29.0 mmol/L Final    Calcium 08/16/2024 9.5  8.6 - 10.5 mg/dL Final    Total Protein 08/16/2024 6.7  6.0 - 8.5 g/dL Final    Albumin 08/16/2024 4.2  3.5 - 5.2 g/dL Final    ALT (SGPT) 08/16/2024 18  1 - 33 U/L Final    AST (SGOT) 08/16/2024 26  1 - 32 U/L Final    Alkaline Phosphatase 08/16/2024 85  39 - 117 U/L Final    Total Bilirubin 08/16/2024 0.5  0.0 - 1.2 mg/dL Final    Globulin 08/16/2024 2.5  gm/dL Final    A/G Ratio 08/16/2024 1.7  g/dL Final    BUN/Creatinine Ratio 08/16/2024 11.9  7.0 - 25.0 Final    Anion Gap 08/16/2024 9.7  5.0 - 15.0 mmol/L Final    eGFR 08/16/2024 61.5  >60.0 mL/min/1.73 Final    WBC 08/16/2024 4.75  3.40 - 10.80 10*3/mm3 Final    RBC 08/16/2024 4.76  3.77 - 5.28 10*6/mm3 Final    Hemoglobin 08/16/2024 14.7  12.0 - 15.9 g/dL Final    Hematocrit 08/16/2024 46.3  34.0 - 46.6 % Final    MCV 08/16/2024 97.3 (H)  79.0 - 97.0 fL Final    MCH 08/16/2024 30.9  26.6 - 33.0 pg Final    MCHC 08/16/2024 31.7  31.5 - 35.7 g/dL Final    RDW 08/16/2024 13.2  12.3 - 15.4 % Final    RDW-SD 08/16/2024 47.8  37.0 - 54.0 fl Final    MPV 08/16/2024 8.5  6.0 - 12.0 fL Final    Platelets 08/16/2024 165  140 - 450 10*3/mm3 Final    Neutrophil % 08/16/2024 63.5  42.7 - 76.0 % Final    Lymphocyte % 08/16/2024 24.4  19.6 - 45.3 % Final    Monocyte % 08/16/2024 8.6  5.0 - 12.0 % Final    Eosinophil % 08/16/2024 2.5  0.3 - 6.2 % Final    Basophil % 08/16/2024 0.8  0.0 - 1.5 % Final    Immature Grans % 08/16/2024 0.2  0.0 - 0.5 % Final    Neutrophils, Absolute 08/16/2024 3.01  1.70 - 7.00 10*3/mm3 Final    Lymphocytes, Absolute 08/16/2024 1.16  0.70 - 3.10 10*3/mm3 Final    Monocytes, Absolute 08/16/2024 0.41  0.10 - 0.90 10*3/mm3 Final    Eosinophils, Absolute 08/16/2024 0.12  0.00 - 0.40  10*3/mm3 Final    Basophils, Absolute 08/16/2024 0.04  0.00 - 0.20 10*3/mm3 Final    Immature Grans, Absolute 08/16/2024 0.01  0.00 - 0.05 10*3/mm3 Final    nRBC 08/16/2024 0.0  0.0 - 0.2 /100 WBC Final        No radiology results for the last 30 days.       Assessment & Plan       *Metastatic ER positive breast cancer  Initially presented with neglected breast in 2019 treated with neoadjuvant Adriamycin and Cytoxan followed by Taxol followed by bilateral mastectomies and extra lymph node dissection and adjuvant radiation.  Started on adjuvant Femara after completion of chemotherapy surgery and radiation in 2019  Metastatic recurrence of disease in May 2021 and hence Ribociclib initiated.  Subsequent acute hepatitis from Ribociclib and hence Ribociclib and Femara discontinued  Started Aromasin in March 2022  Metastatic disease recurrence with pericardial mass in January 2024 biopsy to be ER positive NC positive and HER2 0, [facility] demonstrates ESR 1 mutation  Started back on Ribociclib and continued Aromasin in January 2024  Significant elevation in LFTs after initiation of Ribociclib resulting in discontinuation of all treatment in March 2024  She was also initiated on steroids to help with the hepatitis.  Palliative radiation to the mediastinal mass completed in June 2024  CT of the chest abdomen pelvis from June 2024 shows decrease in size of the mass.  Initiated Faslodex in July 2024  Currently patient continues on monthly Faslodex.  Tolerating that well.  Labs today reviewed and LFTs have returned to normal    *Ribociclib induced hepatitis  Patient has developed increase in LFTs on both occasions after use of Ribociclib.  This has been permanently discontinued  Patient has also been started on prednisone after the most recent acute hepatitis from Ribociclib  Currently she is on a 5 mg dose of prednisone  Recommend that she decrease this to 2.5 mg for the next week and subsequently discontinue.  LFTs will be  monitored closely while she continues on Faslodex only and will not initiate Ribociclib.  In the future if need be we could consider using Ibrance at the lowest dose to see if she would tolerate this.    *Ovarian cyst  Gradual increase in size of the ovarian cyst on the left.  Continue to monitor with repeat CT chest abdomen and pelvis.    *Atrial fibrillation  Continue follow-up with Dr. Latosha Odell with cardiology  Heart rate 71 today  Continues on metoprolol.  Continue Eliquis    *Lymphedema of left upper extremity  Continue follow-up with lymphedema clinic    *Hypertension  Blood pressure well-controlled at 113/81    Follow-up  APRN in 1 month and MD in 2 months  CT of the chest abdomen and pelvis and bone scan prior to follow-up with MD in 2 months    A total of 78 minutes was spent on the day of encounter reviewing her medical records, reviewing multiple imaging studies, reviewing the pathology, face-to-face time.

## 2024-09-05 ENCOUNTER — TELEPHONE (OUTPATIENT)
Dept: ONCOLOGY | Facility: CLINIC | Age: 64
End: 2024-09-05
Payer: MEDICARE

## 2024-09-13 ENCOUNTER — LAB (OUTPATIENT)
Dept: LAB | Facility: HOSPITAL | Age: 64
End: 2024-09-13
Payer: MEDICARE

## 2024-09-13 ENCOUNTER — CLINICAL SUPPORT (OUTPATIENT)
Dept: ONCOLOGY | Facility: HOSPITAL | Age: 64
End: 2024-09-13
Payer: MEDICARE

## 2024-09-13 DIAGNOSIS — Z17.0 MALIGNANT NEOPLASM OF OVERLAPPING SITES OF LEFT BREAST IN FEMALE, ESTROGEN RECEPTOR POSITIVE: ICD-10-CM

## 2024-09-13 DIAGNOSIS — C50.812 MALIGNANT NEOPLASM OF OVERLAPPING SITES OF LEFT BREAST IN FEMALE, ESTROGEN RECEPTOR POSITIVE: ICD-10-CM

## 2024-09-13 LAB
ALBUMIN SERPL-MCNC: 4.4 G/DL (ref 3.5–5.2)
ALBUMIN/GLOB SERPL: 1.5 G/DL
ALP SERPL-CCNC: 91 U/L (ref 39–117)
ALT SERPL W P-5'-P-CCNC: 30 U/L (ref 1–33)
ANION GAP SERPL CALCULATED.3IONS-SCNC: 12.4 MMOL/L (ref 5–15)
AST SERPL-CCNC: 34 U/L (ref 1–32)
BASOPHILS # BLD AUTO: 0.04 10*3/MM3 (ref 0–0.2)
BASOPHILS NFR BLD AUTO: 0.9 % (ref 0–1.5)
BILIRUB SERPL-MCNC: 0.6 MG/DL (ref 0–1.2)
BUN SERPL-MCNC: 19 MG/DL (ref 8–23)
BUN/CREAT SERPL: 18.4 (ref 7–25)
CALCIUM SPEC-SCNC: 9.9 MG/DL (ref 8.6–10.5)
CHLORIDE SERPL-SCNC: 102 MMOL/L (ref 98–107)
CO2 SERPL-SCNC: 24.6 MMOL/L (ref 22–29)
CREAT SERPL-MCNC: 1.03 MG/DL (ref 0.57–1)
DEPRECATED RDW RBC AUTO: 46.9 FL (ref 37–54)
EGFRCR SERPLBLD CKD-EPI 2021: 60.1 ML/MIN/1.73
EOSINOPHIL # BLD AUTO: 0.08 10*3/MM3 (ref 0–0.4)
EOSINOPHIL NFR BLD AUTO: 1.8 % (ref 0.3–6.2)
ERYTHROCYTE [DISTWIDTH] IN BLOOD BY AUTOMATED COUNT: 13.4 % (ref 12.3–15.4)
GLOBULIN UR ELPH-MCNC: 2.9 GM/DL
GLUCOSE SERPL-MCNC: 128 MG/DL (ref 65–99)
HCT VFR BLD AUTO: 45.1 % (ref 34–46.6)
HGB BLD-MCNC: 14.5 G/DL (ref 12–15.9)
IMM GRANULOCYTES # BLD AUTO: 0.02 10*3/MM3 (ref 0–0.05)
IMM GRANULOCYTES NFR BLD AUTO: 0.5 % (ref 0–0.5)
LYMPHOCYTES # BLD AUTO: 1.02 10*3/MM3 (ref 0.7–3.1)
LYMPHOCYTES NFR BLD AUTO: 23 % (ref 19.6–45.3)
MCH RBC QN AUTO: 30.5 PG (ref 26.6–33)
MCHC RBC AUTO-ENTMCNC: 32.2 G/DL (ref 31.5–35.7)
MCV RBC AUTO: 94.7 FL (ref 79–97)
MONOCYTES # BLD AUTO: 0.29 10*3/MM3 (ref 0.1–0.9)
MONOCYTES NFR BLD AUTO: 6.5 % (ref 5–12)
NEUTROPHILS NFR BLD AUTO: 2.98 10*3/MM3 (ref 1.7–7)
NEUTROPHILS NFR BLD AUTO: 67.3 % (ref 42.7–76)
NRBC BLD AUTO-RTO: 0 /100 WBC (ref 0–0.2)
PLATELET # BLD AUTO: 207 10*3/MM3 (ref 140–450)
PMV BLD AUTO: 8.6 FL (ref 6–12)
POTASSIUM SERPL-SCNC: 4 MMOL/L (ref 3.5–5.2)
PROT SERPL-MCNC: 7.3 G/DL (ref 6–8.5)
RBC # BLD AUTO: 4.76 10*6/MM3 (ref 3.77–5.28)
SODIUM SERPL-SCNC: 139 MMOL/L (ref 136–145)
WBC NRBC COR # BLD AUTO: 4.43 10*3/MM3 (ref 3.4–10.8)

## 2024-09-13 PROCEDURE — 80053 COMPREHEN METABOLIC PANEL: CPT

## 2024-09-13 PROCEDURE — 85025 COMPLETE CBC W/AUTO DIFF WBC: CPT

## 2024-09-13 PROCEDURE — 36415 COLL VENOUS BLD VENIPUNCTURE: CPT

## 2024-09-13 NOTE — PROGRESS NOTES
Reviewed CBC/CMP with Ms Georges by phone. Only slight increase in her AST of which Dr Miguel was not concerned about.  Will plan to recheck again in two weeks when she is due for faslodex.  She voiced understanding.    While in Labor & Delivery

## 2024-09-27 ENCOUNTER — LAB (OUTPATIENT)
Dept: LAB | Facility: HOSPITAL | Age: 64
End: 2024-09-27
Payer: MEDICARE

## 2024-09-27 ENCOUNTER — INFUSION (OUTPATIENT)
Dept: ONCOLOGY | Facility: HOSPITAL | Age: 64
End: 2024-09-27
Payer: MEDICARE

## 2024-09-27 ENCOUNTER — OFFICE VISIT (OUTPATIENT)
Dept: ONCOLOGY | Facility: CLINIC | Age: 64
End: 2024-09-27
Payer: MEDICARE

## 2024-09-27 VITALS
TEMPERATURE: 98.2 F | BODY MASS INDEX: 28.03 KG/M2 | WEIGHT: 174.4 LBS | OXYGEN SATURATION: 97 % | SYSTOLIC BLOOD PRESSURE: 108 MMHG | HEART RATE: 69 BPM | DIASTOLIC BLOOD PRESSURE: 76 MMHG | RESPIRATION RATE: 18 BRPM | HEIGHT: 66 IN

## 2024-09-27 DIAGNOSIS — Z17.0 MALIGNANT NEOPLASM OF OVERLAPPING SITES OF LEFT BREAST IN FEMALE, ESTROGEN RECEPTOR POSITIVE: Primary | ICD-10-CM

## 2024-09-27 DIAGNOSIS — Z17.0 MALIGNANT NEOPLASM OF OVERLAPPING SITES OF LEFT BREAST IN FEMALE, ESTROGEN RECEPTOR POSITIVE: ICD-10-CM

## 2024-09-27 DIAGNOSIS — C50.812 MALIGNANT NEOPLASM OF OVERLAPPING SITES OF LEFT BREAST IN FEMALE, ESTROGEN RECEPTOR POSITIVE: Primary | ICD-10-CM

## 2024-09-27 DIAGNOSIS — C50.812 MALIGNANT NEOPLASM OF OVERLAPPING SITES OF LEFT BREAST IN FEMALE, ESTROGEN RECEPTOR POSITIVE: ICD-10-CM

## 2024-09-27 LAB
ALBUMIN SERPL-MCNC: 4.2 G/DL (ref 3.5–5.2)
ALBUMIN/GLOB SERPL: 1.8 G/DL
ALP SERPL-CCNC: 93 U/L (ref 39–117)
ALT SERPL W P-5'-P-CCNC: 38 U/L (ref 1–33)
ANION GAP SERPL CALCULATED.3IONS-SCNC: 11 MMOL/L (ref 5–15)
AST SERPL-CCNC: 33 U/L (ref 1–32)
BASOPHILS # BLD AUTO: 0.04 10*3/MM3 (ref 0–0.2)
BASOPHILS NFR BLD AUTO: 1 % (ref 0–1.5)
BILIRUB SERPL-MCNC: 0.5 MG/DL (ref 0–1.2)
BUN SERPL-MCNC: 16 MG/DL (ref 8–23)
BUN/CREAT SERPL: 16.8 (ref 7–25)
CALCIUM SPEC-SCNC: 9.4 MG/DL (ref 8.6–10.5)
CHLORIDE SERPL-SCNC: 104 MMOL/L (ref 98–107)
CO2 SERPL-SCNC: 25 MMOL/L (ref 22–29)
CREAT SERPL-MCNC: 0.95 MG/DL (ref 0.57–1)
DEPRECATED RDW RBC AUTO: 47.4 FL (ref 37–54)
EGFRCR SERPLBLD CKD-EPI 2021: 66.2 ML/MIN/1.73
EOSINOPHIL # BLD AUTO: 0.06 10*3/MM3 (ref 0–0.4)
EOSINOPHIL NFR BLD AUTO: 1.5 % (ref 0.3–6.2)
ERYTHROCYTE [DISTWIDTH] IN BLOOD BY AUTOMATED COUNT: 13.6 % (ref 12.3–15.4)
GLOBULIN UR ELPH-MCNC: 2.4 GM/DL
GLUCOSE SERPL-MCNC: 140 MG/DL (ref 65–99)
HCT VFR BLD AUTO: 44.7 % (ref 34–46.6)
HGB BLD-MCNC: 14.4 G/DL (ref 12–15.9)
IMM GRANULOCYTES # BLD AUTO: 0.01 10*3/MM3 (ref 0–0.05)
IMM GRANULOCYTES NFR BLD AUTO: 0.3 % (ref 0–0.5)
LYMPHOCYTES # BLD AUTO: 0.96 10*3/MM3 (ref 0.7–3.1)
LYMPHOCYTES NFR BLD AUTO: 24.7 % (ref 19.6–45.3)
MCH RBC QN AUTO: 30.7 PG (ref 26.6–33)
MCHC RBC AUTO-ENTMCNC: 32.2 G/DL (ref 31.5–35.7)
MCV RBC AUTO: 95.3 FL (ref 79–97)
MONOCYTES # BLD AUTO: 0.26 10*3/MM3 (ref 0.1–0.9)
MONOCYTES NFR BLD AUTO: 6.7 % (ref 5–12)
NEUTROPHILS NFR BLD AUTO: 2.56 10*3/MM3 (ref 1.7–7)
NEUTROPHILS NFR BLD AUTO: 65.8 % (ref 42.7–76)
NRBC BLD AUTO-RTO: 0 /100 WBC (ref 0–0.2)
PLATELET # BLD AUTO: 166 10*3/MM3 (ref 140–450)
PMV BLD AUTO: 8.8 FL (ref 6–12)
POTASSIUM SERPL-SCNC: 4.1 MMOL/L (ref 3.5–5.2)
PROT SERPL-MCNC: 6.6 G/DL (ref 6–8.5)
RBC # BLD AUTO: 4.69 10*6/MM3 (ref 3.77–5.28)
SODIUM SERPL-SCNC: 140 MMOL/L (ref 136–145)
WBC NRBC COR # BLD AUTO: 3.89 10*3/MM3 (ref 3.4–10.8)

## 2024-09-27 PROCEDURE — 36415 COLL VENOUS BLD VENIPUNCTURE: CPT

## 2024-09-27 PROCEDURE — 25010000002 FULVESTRANT PER 25 MG: Performed by: NURSE PRACTITIONER

## 2024-09-27 PROCEDURE — 85025 COMPLETE CBC W/AUTO DIFF WBC: CPT

## 2024-09-27 PROCEDURE — 96402 CHEMO HORMON ANTINEOPL SQ/IM: CPT

## 2024-09-27 PROCEDURE — 80053 COMPREHEN METABOLIC PANEL: CPT

## 2024-09-27 RX ORDER — LAMOTRIGINE 25 MG/1
500 TABLET ORAL ONCE
Status: CANCELLED | OUTPATIENT
Start: 2024-09-27

## 2024-09-27 RX ORDER — LAMOTRIGINE 25 MG/1
500 TABLET ORAL ONCE
Status: COMPLETED | OUTPATIENT
Start: 2024-09-27 | End: 2024-09-27

## 2024-09-27 RX ADMIN — FULVESTRANT 500 MG: 50 INJECTION INTRAMUSCULAR at 11:09

## 2024-10-10 ENCOUNTER — TELEPHONE (OUTPATIENT)
Dept: CARDIOLOGY | Facility: CLINIC | Age: 64
End: 2024-10-10

## 2024-10-10 ENCOUNTER — OFFICE VISIT (OUTPATIENT)
Dept: CARDIOLOGY | Facility: CLINIC | Age: 64
End: 2024-10-10
Payer: MEDICARE

## 2024-10-10 VITALS
HEART RATE: 81 BPM | HEIGHT: 65 IN | SYSTOLIC BLOOD PRESSURE: 118 MMHG | DIASTOLIC BLOOD PRESSURE: 78 MMHG | WEIGHT: 177 LBS | BODY MASS INDEX: 29.49 KG/M2

## 2024-10-10 DIAGNOSIS — I48.19 PERSISTENT ATRIAL FIBRILLATION: Primary | ICD-10-CM

## 2024-10-10 PROCEDURE — 93000 ELECTROCARDIOGRAM COMPLETE: CPT | Performed by: INTERNAL MEDICINE

## 2024-10-10 PROCEDURE — 3074F SYST BP LT 130 MM HG: CPT | Performed by: INTERNAL MEDICINE

## 2024-10-10 PROCEDURE — 3078F DIAST BP <80 MM HG: CPT | Performed by: INTERNAL MEDICINE

## 2024-10-10 PROCEDURE — 99214 OFFICE O/P EST MOD 30 MIN: CPT | Performed by: INTERNAL MEDICINE

## 2024-10-10 NOTE — PROGRESS NOTES
Date of Office Visit: 10/10/2024  Encounter Provider: Danni Odell MD  Place of Service: [facility] CARDIOLOGY  Patient Name: Marylu Georges  :1960    Chief complaint  Cardio oncology care, cardiac mass, atrial fibrillation, pulmonary hypertension, prolonged QTc    History of Present Illness  Patient is a 64-year-old female with history of hypertension, prior stroke, left-sided breast cancer.  She had a large mass that was neglected for a period of time and became quite gigantic and ulcerated.  By 2019 she was diagnosed with breast cancer.  She had significant improvement with AC therapy in 2019 (total dose 243 mg/m² of Adriamycin) and this was followed by Taxol therapy which is completed on 2019. She subsequently underwent bilateral mastectomy 2019 with axillary node dissection.  She also underwent radiation therapy completed on 2020 and has been on adjuvant Femara since 2019.  She was able to achieve remission with this.  She then presented on 2021 showing some activity on a PET scan in the left side chest wall.  It was appearing to abut the lower aspect of her heart.  Kisquali was initiated.  She was started on magnesium with concerns of QTC prolongation that can be associated with this agent.  On 2021 tumor marker CA 15-3 had dropped and there was resolution lymphadenopathy was noted by CT imaging.  Liver function tests were elevated and Kisquali  and then Femara were discontinued being seen by GI.    In 2019 she had an echocardiogram in the setting of syncope echocardiogram that showed an ejection fraction of 60% with moderately dilated left atrium and negative saline injection.  I do not see any strain measurements.  Stress perfusion study was negative for ischemia.  Monitor showed few episodes of atrial tachycardia that were quite brief.  There is no residual malignancy,  She then received radiation therapy to bilateral chest 10/.  With  findings of mass near the pericardium follow-up echocardiogram was performed on 6/2021 that showed an ejection fraction 57% with GLS -19.9% with normal wall thickness trivial valve regurgitation no pericardial mass was appreciated.  And 6/2022 she was admitted with infectious arthritis discitis and myositis.  She was treated with antibiotics.  While in hospital she developed new onset atrial fibrillation with rapid rates.  Echo showed an ejection fraction of 55% with indeterminate diastolic function small patent foramen ovale with mild pulmonary hypertension with an RV systolic pressure 41 mmHg.  She was placed on Eliquis and metoprolol.  By 9/2022 she underwent synchronized cardioversion to sinus rhythm by Dr Gustafson.  She was seen in follow-up by EP service and while [id number] score is 1 with a history of carcinoma it was felt that her stroke risk was higher and Eliquis was continued.  Echo on 10/2023 showed an ejection fraction 61%, GLS -21.1% LV was mildly dilated with grade 1 diastolic dysfunction, atrial septum was redundant the injection was not performed there was aortic valve thickening without stenosis there is mild valve regurgitation with an RVSP of 39 mmHg.  Overall stable with slight increase in pulmonary pressures.  On July 2023 with mild normal EKG changes stress perfusion study was negative for ischemia.  She had recurrent atrial fibrillation for which he underwent successful electrical cardioversion in October 2023.  In the meanwhile she has been continued on Aromasin.  On 12/2023 she complained of chest discomfort and a CT angiogram of the chest showed anterior pericardial mass extending around the anterior chest with thickening of the pericardium at the base of the mass.  Surgical or radiation related therapy were not felt to be adequate. Kisqali was resumed.  She developed elevated transaminases that resolved after chemotherapy was stopped and started on steroids.  She started palliative  therapy on 5/2021.  She was dehydrated with nausea.  IV fluids were given on 6/4/2024.  In the midst of this she had recurrent atrial fibrillation controlled.  CT scan of the chest abdomen and pelvis anterior pericardial mass was present and slightly smaller.  Patient to study from 4/2024.  Liver was unremarkable.  Large left adnexal cyst increased in size currently.  On May and June 2024 she patient was seen in the emergency room with nausea and headac was seen in the emergency room twice for hypertensive urgency.  On 7/2024 she was started on Faslodex  With plans to consider Ibrance at a later date.  Liver function test again slightly elevated.  Of note ovarian cyst has been noted to slowly increase in size.  This is being monitored by CT imaging.    Last echo 1/2024 with ejection fraction 54% negative saline injection.  On my review of prior images there was no obvious.  Intra or extracardiac mass appreciated.  CT chest 6/2024 showed a decrease in the anterior pericardial mass from 4/2024, right pelvic cyst stable, left large pelvic cyst larger from 9/2022.  Since last visit patient has    No palpitations edema dizziness chest pain.  She is not exercising and has had modest dyspnea that is chronic with exercise.  She is troubled by vertigo.  EKG with atrial fibrillation and chronic ST T wave changes      Past Medical History:   Diagnosis Date    Anemia in neoplastic disease     Arthritis     Arthritis of back     Arthritis of neck     Breast cancer     Left    CVA (cerebral vascular accident)     Encounter for long-term (current) use of other medications 06/22/2021    Fracture, fibula     Fracture, finger     Frozen shoulder     Hip arthrosis 01/17    Hip pain     RIGHT HIP... CYST    History of fracture of leg 1987    History of radiation therapy     LAST TREATMENT      Hypertension     Knee swelling     Limited joint range of motion (ROM)     RIGHT HIP    Low back strain     Periarthritis of shoulder      Rotator cuff syndrome 6/22    Skin sore     OPEN SORE LEFT BREAST    Syncope     Vertigo      Past Surgical History:   Procedure Laterality Date    AXILLARY LYMPH NODE BIOPSY/EXCISION Right     LYMPH NODE UNDER RIGHT ARM-MALIGNANT (DOUBLE MASTECTOMY)    BREAST BIOPSY Left     MALIGNANT    FRACTURE SURGERY  1987    Leg    HARDWARE REMOVAL      INCISION AND DRAINAGE LEG Left 06/30/2022    Procedure: INCISION AND DRAINAGE left ankle;  Surgeon: Kenneth Tran MD;  Location: [facility] MAIN OR;  Service: Orthopedics;  Laterality: Left;    JOINT REPLACEMENT Bilateral mar 2020,july 2021    hips    MASTECTOMY W/ SENTINEL NODE BIOPSY Bilateral 09/16/2019    Procedure: BILATERLA MODIFIED RADICAL MASTECTOMY WITH BILATERAL SENTINEL LYMPH NODE BIOPSY;  Surgeon: Joby Barron Jr., MD;  Location: [facility] MAIN OR;  Service: General    TOTAL HIP ARTHROPLASTY Right 03/02/2020    Procedure: RIGHT TOTAL HIP ARTHROPLASTY NATALY NAVIGATION;  Surgeon: Luis M Leonard MD;  Location: [facility] MAIN OR;  Service: Orthopedics;  Laterality: Right;    TOTAL HIP ARTHROPLASTY Left 07/20/2021    Procedure: Posterior LEFT TOTAL HIP ARTHROPLASTY NATALY NAVIGATION;  Surgeon: Luis M Leonard MD;  Location: [facility] MAIN OR;  Service: Orthopedics;  Laterality: Left;    VENOUS ACCESS DEVICE (PORT) INSERTION Right 02/01/2019    Procedure: INSERTION VENOUS ACCESS DEVICE;  Surgeon: Joby Barron Jr., MD;  Location: [facility];  Service: General    VENOUS ACCESS DEVICE (PORT) REMOVAL N/A 10/30/2019    Procedure: Mediport Removal;  Surgeon: Joby Barron Jr., MD;  Location: [facility] OR;  Service: General     Outpatient Medications Prior to Visit   Medication Sig Dispense Refill    apixaban (Eliquis) 5 MG tablet tablet TAKE ONE TABLET BY MOUTH EVERY 12 HOURS 60 tablet 3    metoprolol succinate XL (TOPROL-XL) 50 MG 24 hr tablet Take 2 tablets po q am and one tablet po q pm 270 tablet 3    prochlorperazine (COMPAZINE) 10 MG tablet Take 1 tablet by  mouth Every 6 (Six) Hours As Needed for Nausea or Vomiting. 10 tablet 0    tiZANidine (ZANAFLEX) 4 MG tablet Take 1 tablet by mouth Every 6 (Six) Hours As Needed for Muscle Spasms.      amLODIPine (NORVASC) 2.5 MG tablet Take 1 tablet by mouth Daily. (Patient not taking: Reported on 8/30/2024) 30 tablet 11     Facility-Administered Medications Prior to Visit   Medication Dose Route Frequency Provider Last Rate Last Admin    Chlorhexidine Gluconate Cloth 2 % pads 1 each  1 each Apply externally Take As Directed Luis M Leonard MD           Allergies as of 10/10/2024 - Reviewed 10/10/2024   Allergen Reaction Noted    Benadryl [diphenhydramine] Itching 02/18/2020    Erythromycin GI Intolerance 01/17/2019    Levaquin [levofloxacin] GI Intolerance 03/07/2019    Penicillins GI Intolerance 01/17/2019     Social History     Socioeconomic History    Marital status:      Spouse name: Cruz    Number of children: 0   Tobacco Use    Smoking status: Never    Smokeless tobacco: Never   Vaping Use    Vaping status: Never Used   Substance and Sexual Activity    Alcohol use: Yes     Comment: occasional    Drug use: No    Sexual activity: Not Currently     Partners: Male     Birth control/protection: Abstinence     Family History   Problem Relation Age of Onset    Lung cancer Father     Irritable bowel syndrome Father     Cancer Mother     Breast cancer Mother     Liver disease Mother     Breast cancer Sister 48    Breast cancer Maternal Grandmother     Breast cancer Paternal Grandmother     Breast cancer Maternal Uncle     Malig Hyperthermia Neg Hx      Review of Systems   Constitutional: Negative for chills, fever, weight gain and weight loss.   Cardiovascular:  Positive for dyspnea on exertion. Negative for leg swelling.   Respiratory:  Negative for cough, snoring and wheezing.    Hematologic/Lymphatic: Negative for bleeding problem. Does not bruise/bleed easily.   Skin:  Negative for color change.   Musculoskeletal:   "Negative for falls, joint pain and myalgias.   Gastrointestinal:  Negative for melena.   Genitourinary:  Negative for hematuria.   Neurological:  Negative for excessive daytime sleepiness.   Psychiatric/Behavioral:  Negative for depression. The patient is not nervous/anxious.         Objective:     Vitals:    10/10/24 0743   BP: 118/78   Pulse: 81   Weight: 80.3 kg (177 lb)   Height: 165.1 cm (65\")     Body mass index is 29.45 kg/m².    Vitals reviewed.   Constitutional:       Appearance: Well-developed.   Eyes:      General: No scleral icterus.        Right eye: No discharge.      Conjunctiva/sclera: Conjunctivae normal.      Pupils: Pupils are equal, round, and reactive to light.   HENT:      Head: Normocephalic.      Nose: Nose normal.   Neck:      Thyroid: No thyromegaly.      Vascular: No JVD.   Pulmonary:      Effort: Pulmonary effort is normal. No respiratory distress.      Breath sounds: Normal breath sounds. No wheezing. No rales.   Cardiovascular:      Normal rate. Regular rhythm. Normal S1. Normal S2.       Murmurs: There is no murmur.      No gallop.    Pulses:     Intact distal pulses.      Carotid: 2+ bilaterally.     Radial: 2+ bilaterally.     Femoral: 2+ bilaterally.     Popliteal: 2+ bilaterally.     Dorsalis pedis: 2+ bilaterally.     Posterior tibial: 2+ bilaterally.  Edema:     Peripheral edema absent.   Abdominal:      General: Bowel sounds are normal. There is no distension.      Palpations: Abdomen is soft.      Tenderness: There is no abdominal tenderness. There is no rebound.   Musculoskeletal: Normal range of motion.         General: No tenderness.      Cervical back: Normal range of motion and neck supple. Skin:     General: Skin is warm and dry.      Findings: No erythema or rash.   Neurological:      Mental Status: Alert and oriented to person, place, and time.   Psychiatric:         Behavior: Behavior normal.         Thought Content: Thought content normal.         Judgment: Judgment " "normal.       Lab Review:   Lab Results - Last 18 Months   Lab Units 09/27/24  1019 09/13/24  1007   WBC 10*3/mm3 3.89 4.43   RBC 10*6/mm3 4.69 4.76   HEMOGLOBIN g/dL 14.4 14.5   HEMATOCRIT % 44.7 45.1   MCV fL 95.3 94.7   MCH pg 30.7 30.5   MCHC g/dL 32.2 32.2   RDW % 13.6 13.4   PLATELETS 10*3/mm3 166 207   NEUTROPHIL % % 65.8 67.3   LYMPHOCYTE % % 24.7 23.0   MONOCYTES % % 6.7 6.5   EOSINOPHIL % % 1.5 1.8   BASOPHIL % % 1.0 0.9   NEUTROS ABS 10*3/mm3 2.56 2.98   LYMPHS ABS 10*3/mm3 0.96 1.02   MONOS ABS 10*3/mm3 0.26 0.29   EOS ABS 10*3/mm3 0.06 0.08   BASOS ABS 10*3/mm3 0.04 0.04   RDW-SD fl 47.4 46.9   MPV fL 8.8 8.6       Lab Results - Last 18 Months   Lab Units 09/27/24  1019 09/13/24  1007   GLUCOSE mg/dL 140* 128*   BUN mg/dL 16 19   CREATININE mg/dL 0.95 1.03*   SODIUM mmol/L 140 139   POTASSIUM mmol/L 4.1 4.0   CHLORIDE mmol/L 104 102   CO2 mmol/L 25.0 24.6   CALCIUM mg/dL 9.4 9.9   TOTAL PROTEIN g/dL 6.6 7.3   ALBUMIN g/dL 4.2 4.4   ALT (SGPT) U/L 38* 30   AST (SGOT) U/L 33* 34*   ALK PHOS U/L 93 91   BILIRUBIN mg/dL 0.5 0.6   GLOBULIN gm/dL 2.4 2.9   A/G RATIO g/dL 1.8 1.5   BUN / CREAT RATIO  16.8 18.4   ANION GAP mmol/L 11.0 12.4   EGFR mL/min/1.73 66.2 60.1       Lab Results - Last 18 Months   Lab Units 06/25/24  1215   HSTROP T ng/L <6     No results for input(s): \"TSH\" in the last 46545 hours.  Lab Results - Last 18 Months   Lab Units 06/25/24  1215 01/15/24  0857   PROTIME Seconds 14.6* 13.3           ECG 12 Lead    Date/Time: 10/10/2024 7:52 AM  Performed by: Danni Odell MD    Authorized by: Danni Odell MD  Comparison: compared with previous ECG   Similar to previous ECG  Rhythm: atrial fibrillation  Other findings: non-specific ST-T wave changes    Clinical impression: abnormal EKG         No diagnosis found.  Plan:       1.  Large anterior pericardial mass, presumably metastatic disease.  On chemo radiation therapy.  This seems to have improved on radiation therapy and Faslodex started in " July.  It is not evident by echo.  Clinically doing well.  2.  Hypertension with recent ER visit for hypertensive urgency.  Seems much better controlled on the current regimen.  She will watch for dietary indiscretions with the upcoming holidays over the next several months.  If systolic pressures are elevated with upcoming months it is okay to take an extra dose of metoprolol 50 mg.  3.  Abnormal EKG with ST-T wave changes.  Stress perfusion scan negative for ischemia in July 2023.  Labile changes are present.  Recent CT scan did not show pericardial effusion.  Echo 1/2024 unremarkable  4.  Persistent atrial fibrillation, status post electrical cardioversion to sinus rhythm x 2.  Now back in atrial fibrillation with a controlled rate.  Intermittently been off Eliquis in the past for various reasons.  Tolerating metoprolol and Eliquis  5.  Metastatic breast cancer.  Has had chemoradiation therapy felt to be stable and now on Faslodex.  6.  Pulmonary hypertension.  Slightly elevated with RVSP of 39 mmHg and resolved on echo dated 1/2024  7.  Elevated liver function tests.  Attributed to chemotherapy.  However noted plans for possible biopsy if remains elevated.  8.  Arthritis, septic.  Treated with antibiotics IV.      Time Spent: I spent 35 minutes caring for Marylu on this date of service. This time includes time spent by me in the following activities: preparing for the visit, reviewing tests, obtaining and/or reviewing a separately obtained history, performing a medically appropriate examination and/or evaluation, counseling and educating the patient/family/caregiver, ordering medications, tests, or procedures, documenting information in the medical record, and independently interpreting results and communicating that information with the patient/family/caregiver.   I spent 1 minutes on the separately reported service of ECG. This time is not included in the time used to support the E/M service also reported today.         Your medication list            Accurate as of October 10, 2024 11:59 PM. If you have any questions, ask your nurse or doctor.                CONTINUE taking these medications        Instructions Last Dose Given Next Dose Due   Eliquis 5 MG tablet tablet  Generic drug: apixaban      TAKE ONE TABLET BY MOUTH EVERY 12 HOURS       metoprolol succinate XL 50 MG 24 hr tablet  Commonly known as: TOPROL-XL      Take 2 tablets po q am and one tablet po q pm       prochlorperazine 10 MG tablet  Commonly known as: COMPAZINE      Take 1 tablet by mouth Every 6 (Six) Hours As Needed for Nausea or Vomiting.       tiZANidine 4 MG tablet  Commonly known as: ZANAFLEX      Take 1 tablet by mouth Every 6 (Six) Hours As Needed for Muscle Spasms.                Patient is no longer taking -.  I corrected the med list to reflect this.  I did not stop these medications.      Dictated utilizing Dragon dictation

## 2024-10-21 ENCOUNTER — HOSPITAL ENCOUNTER (OUTPATIENT)
Dept: NUCLEAR MEDICINE | Facility: HOSPITAL | Age: 64
Discharge: HOME | End: 2024-10-21
Payer: MEDICARE

## 2024-10-21 ENCOUNTER — HOSPITAL ENCOUNTER (OUTPATIENT)
Dept: CT IMAGING | Facility: HOSPITAL | Age: 64
Discharge: HOME | End: 2024-10-21
Admitting: INTERNAL MEDICINE
Payer: MEDICARE

## 2024-10-21 DIAGNOSIS — C50.812 MALIGNANT NEOPLASM OF OVERLAPPING SITES OF LEFT BREAST IN FEMALE, ESTROGEN RECEPTOR POSITIVE: ICD-10-CM

## 2024-10-21 DIAGNOSIS — Z17.0 MALIGNANT NEOPLASM OF OVERLAPPING SITES OF LEFT BREAST IN FEMALE, ESTROGEN RECEPTOR POSITIVE: ICD-10-CM

## 2024-10-21 PROCEDURE — 25510000001 IOPAMIDOL 61 % SOLUTION: Performed by: INTERNAL MEDICINE

## 2024-10-21 PROCEDURE — 74177 CT ABD & PELVIS W/CONTRAST: CPT

## 2024-10-21 PROCEDURE — 0 DIATRIZOATE MEGLUMINE & SODIUM PER 1 ML: Performed by: INTERNAL MEDICINE

## 2024-10-21 PROCEDURE — 71260 CT THORAX DX C+: CPT

## 2024-10-21 PROCEDURE — 78306 BONE IMAGING WHOLE BODY: CPT

## 2024-10-21 PROCEDURE — A9503 TC99M MEDRONATE: HCPCS | Performed by: INTERNAL MEDICINE

## 2024-10-21 PROCEDURE — 0 TECHNETIUM MEDRONATE KIT: Performed by: INTERNAL MEDICINE

## 2024-10-21 RX ORDER — IOPAMIDOL 612 MG/ML
100 INJECTION, SOLUTION INTRAVASCULAR
Status: COMPLETED | OUTPATIENT
Start: 2024-10-21 | End: 2024-10-21

## 2024-10-21 RX ORDER — DIATRIZOATE MEGLUMINE AND DIATRIZOATE SODIUM 660; 100 MG/ML; MG/ML
30 SOLUTION ORAL; RECTAL
Status: COMPLETED | OUTPATIENT
Start: 2024-10-21 | End: 2024-10-21

## 2024-10-21 RX ORDER — TC 99M MEDRONATE 20 MG/10ML
24 INJECTION, POWDER, LYOPHILIZED, FOR SOLUTION INTRAVENOUS
Status: COMPLETED | OUTPATIENT
Start: 2024-10-21 | End: 2024-10-21

## 2024-10-21 RX ADMIN — IOPAMIDOL 85 ML: 612 INJECTION, SOLUTION INTRAVENOUS at 10:45

## 2024-10-21 RX ADMIN — Medication 24 MILLICURIE: at 09:44

## 2024-10-21 RX ADMIN — DIATRIZOATE MEGLUMINE AND DIATRIZOATE SODIUM 30 ML: 660; 100 LIQUID ORAL; RECTAL at 10:46

## 2024-10-25 ENCOUNTER — OFFICE VISIT (OUTPATIENT)
Dept: ONCOLOGY | Facility: CLINIC | Age: 64
End: 2024-10-25
Payer: MEDICARE

## 2024-10-25 ENCOUNTER — LAB (OUTPATIENT)
Dept: LAB | Facility: HOSPITAL | Age: 64
End: 2024-10-25
Payer: MEDICARE

## 2024-10-25 ENCOUNTER — INFUSION (OUTPATIENT)
Dept: ONCOLOGY | Facility: HOSPITAL | Age: 64
End: 2024-10-25
Payer: MEDICARE

## 2024-10-25 VITALS
WEIGHT: 175.8 LBS | BODY MASS INDEX: 29.29 KG/M2 | OXYGEN SATURATION: 98 % | HEIGHT: 65 IN | TEMPERATURE: 98 F | DIASTOLIC BLOOD PRESSURE: 74 MMHG | RESPIRATION RATE: 16 BRPM | HEART RATE: 80 BPM | SYSTOLIC BLOOD PRESSURE: 109 MMHG

## 2024-10-25 DIAGNOSIS — C50.812 MALIGNANT NEOPLASM OF OVERLAPPING SITES OF LEFT BREAST IN FEMALE, ESTROGEN RECEPTOR POSITIVE: Primary | ICD-10-CM

## 2024-10-25 DIAGNOSIS — Z17.0 MALIGNANT NEOPLASM OF OVERLAPPING SITES OF LEFT BREAST IN FEMALE, ESTROGEN RECEPTOR POSITIVE: Primary | ICD-10-CM

## 2024-10-25 DIAGNOSIS — C50.812 MALIGNANT NEOPLASM OF OVERLAPPING SITES OF LEFT BREAST IN FEMALE, ESTROGEN RECEPTOR POSITIVE: ICD-10-CM

## 2024-10-25 DIAGNOSIS — Z17.0 MALIGNANT NEOPLASM OF OVERLAPPING SITES OF LEFT BREAST IN FEMALE, ESTROGEN RECEPTOR POSITIVE: ICD-10-CM

## 2024-10-25 LAB
ALBUMIN SERPL-MCNC: 4.3 G/DL (ref 3.5–5.2)
ALBUMIN/GLOB SERPL: 1.8 G/DL
ALP SERPL-CCNC: 93 U/L (ref 39–117)
ALT SERPL W P-5'-P-CCNC: 27 U/L (ref 1–33)
ANION GAP SERPL CALCULATED.3IONS-SCNC: 11.4 MMOL/L (ref 5–15)
AST SERPL-CCNC: 31 U/L (ref 1–32)
BASOPHILS # BLD AUTO: 0.05 10*3/MM3 (ref 0–0.2)
BASOPHILS NFR BLD AUTO: 1.1 % (ref 0–1.5)
BILIRUB SERPL-MCNC: 0.4 MG/DL (ref 0–1.2)
BUN SERPL-MCNC: 15 MG/DL (ref 8–23)
BUN/CREAT SERPL: 14.9 (ref 7–25)
CALCIUM SPEC-SCNC: 9.5 MG/DL (ref 8.6–10.5)
CANCER AG15-3 SERPL-ACNC: 17.8 U/ML
CHLORIDE SERPL-SCNC: 103 MMOL/L (ref 98–107)
CO2 SERPL-SCNC: 25.6 MMOL/L (ref 22–29)
CREAT SERPL-MCNC: 1.01 MG/DL (ref 0.57–1)
DEPRECATED RDW RBC AUTO: 47 FL (ref 37–54)
EGFRCR SERPLBLD CKD-EPI 2021: 61.5 ML/MIN/1.73
EOSINOPHIL # BLD AUTO: 0.14 10*3/MM3 (ref 0–0.4)
EOSINOPHIL NFR BLD AUTO: 3.2 % (ref 0.3–6.2)
ERYTHROCYTE [DISTWIDTH] IN BLOOD BY AUTOMATED COUNT: 13.7 % (ref 12.3–15.4)
GLOBULIN UR ELPH-MCNC: 2.4 GM/DL
GLUCOSE SERPL-MCNC: 97 MG/DL (ref 65–99)
HCT VFR BLD AUTO: 44.7 % (ref 34–46.6)
HGB BLD-MCNC: 14.3 G/DL (ref 12–15.9)
IMM GRANULOCYTES # BLD AUTO: 0.01 10*3/MM3 (ref 0–0.05)
IMM GRANULOCYTES NFR BLD AUTO: 0.2 % (ref 0–0.5)
LYMPHOCYTES # BLD AUTO: 1.1 10*3/MM3 (ref 0.7–3.1)
LYMPHOCYTES NFR BLD AUTO: 25.2 % (ref 19.6–45.3)
MCH RBC QN AUTO: 30 PG (ref 26.6–33)
MCHC RBC AUTO-ENTMCNC: 32 G/DL (ref 31.5–35.7)
MCV RBC AUTO: 93.7 FL (ref 79–97)
MONOCYTES # BLD AUTO: 0.37 10*3/MM3 (ref 0.1–0.9)
MONOCYTES NFR BLD AUTO: 8.5 % (ref 5–12)
NEUTROPHILS NFR BLD AUTO: 2.7 10*3/MM3 (ref 1.7–7)
NEUTROPHILS NFR BLD AUTO: 61.8 % (ref 42.7–76)
NRBC BLD AUTO-RTO: 0 /100 WBC (ref 0–0.2)
PLATELET # BLD AUTO: 183 10*3/MM3 (ref 140–450)
PMV BLD AUTO: 8.7 FL (ref 6–12)
POTASSIUM SERPL-SCNC: 4.3 MMOL/L (ref 3.5–5.2)
PROT SERPL-MCNC: 6.7 G/DL (ref 6–8.5)
RBC # BLD AUTO: 4.77 10*6/MM3 (ref 3.77–5.28)
SODIUM SERPL-SCNC: 140 MMOL/L (ref 136–145)
WBC NRBC COR # BLD AUTO: 4.37 10*3/MM3 (ref 3.4–10.8)

## 2024-10-25 PROCEDURE — 36415 COLL VENOUS BLD VENIPUNCTURE: CPT

## 2024-10-25 PROCEDURE — 25010000002 FULVESTRANT PER 25 MG: Performed by: INTERNAL MEDICINE

## 2024-10-25 PROCEDURE — 96402 CHEMO HORMON ANTINEOPL SQ/IM: CPT

## 2024-10-25 PROCEDURE — 86300 IMMUNOASSAY TUMOR CA 15-3: CPT | Performed by: INTERNAL MEDICINE

## 2024-10-25 PROCEDURE — 85025 COMPLETE CBC W/AUTO DIFF WBC: CPT

## 2024-10-25 PROCEDURE — 80053 COMPREHEN METABOLIC PANEL: CPT

## 2024-10-25 RX ORDER — LAMOTRIGINE 25 MG/1
500 TABLET ORAL ONCE
Status: COMPLETED | OUTPATIENT
Start: 2024-10-25 | End: 2024-10-25

## 2024-10-25 RX ORDER — LAMOTRIGINE 25 MG/1
500 TABLET ORAL ONCE
Status: CANCELLED | OUTPATIENT
Start: 2024-10-25

## 2024-10-25 RX ADMIN — FULVESTRANT 500 MG: 50 INJECTION INTRAMUSCULAR at 10:22

## 2024-10-25 NOTE — PROGRESS NOTES
Subjective   Marylu Georges is a 64 y.o. female.  With metastatic ER positive breast cancer.    History of Present Illness     Patient is a postmenopausal 64-year-old  lady who initially presented with a neglected left breast with axillary lymphadenopathy which was measuring up to 9 cm.  The tumor was fixed at the time of presentation.  She was treated with neoadjuvant Adriamycin and Cytoxan followed by Taxol in 2019 and subsequently underwent adjuvant radiation to the left chest wall and axilla.  Placed on adjuvant Femara.      PET/CT on 5/19/2021 which showed 2.6 x 1 cm enlarged lymph node in the right costophrenic fat pad which was new.  SUV 6.8.  Findings consistent with localized metastatic disease.  No other foci of pathological hypermetabolic some identified in the chest.  7 x 4.8 cm unilocular cyst in the left adnexa most likely arises from the ovary.  Slight increase in size since previous scan when it measured 5.5 cm.  The cyst is photopenic supporting a benign etiology.    Patient was initiated on Ribociclib in May 2021 and Femara continued.  September 2021 it was noted that LFTs were elevated and Ribociclib and Femara were placed on hold.  CT chest performed in November 2021 showed resolution of the cardiophrenic angle lymph node.    Resumed Femara only in November 2021.    Patient was seen again 12/29/2021 and had persistent elevation of LFTs although not any worse compared to November 2021.  In fact LFTs had improved with an ALT of 326 and AST of 167.  Due to persistent elevation of the LFTs Femara was discontinued.    1/25/2022-improvement in LFTs with ALT of 90 and AST of 55.  Patient also had liver biopsy which was showing hepatitis.    3/7/2022-patient was initiated on Aromasin 25 mg daily.    In December 2023 patient complained of intermittent chest pain.  This was further evaluated with a CT angiogram of the chest performed on 12/29/2023.  There was an irregular heterogeneous anterior  pericardial mass which extends into the anterior chest wall measuring 6.5 cm transversely and on coronal series 6.5 x 5.2 cm.  There is thickening of the pericardium along the base of the mass and adjacent to the mass as well.  Appearance is suspicious for metastatic disease versus primary malignancy.  Enlarged nodes at both epicardial fat pads largest measuring 1.4 x 0.9 cm.  No evidence of pulmonary embolism.    1/23/2024-PET/CT with enlarging intensely avid anterior pericardial soft tissue mass representing patient's known malignancy.  Adjacent cardiophrenic soft tissue nodules and lymph nodes which are also larger when compared to September 2022 scan.  This raises concern for metastatic disease.  Scattered bilateral subcentimeter pulmonary nodules.  Focal uptake within the pubic symphysis with a focus of osteolysis and apparent mild widening increased from September 2022.  Metastatic disease would be atypical and underlying synovitis or infection cannot be excluded.    Adnexal cyst has increased in size since September 2022 and now measures 6.2 x 9.5 cm.    1/15/2024 biopsy of the mediastinal mass was consistent with metastatic breast cancer.  ER +81 to 90% strong  AK +2% weak  HER2 negative, score 0    [facility] NGS with ESR 1 mutation.    Ribociclib was resumed in January 2024 and LFTs monitored closely.    In March 2024 LFTs started to spike up and subsequently Ribociclib as well as Aromasin was discontinued.     She completed radiation to the metastatic deposit in front of the pericardium in June 2024.    Initiated Faslodex in July 2024.    6/26/2024-CT of the chest abdomen and pelvis  Slight decrease in size of the anterior pericardial mass  Small right pelvic cyst appears stable when compared to 9/7/2022.  Larger left pelvic cyst measures 10.5 x 6.4 cm which has increased in size since September 2022.  Further evaluation is suggested.  If conservative management is elected then short-term follow-up with CT  pelvis recommended.  Status post bilateral mastectomies and bilateral axillary node dissection.      10/21/2024-CT of the chest abdomen and pelvis-decrease in size of the anterior pericardial mass measuring 5.5 x 1.2 cm, previously 5.6 x 1.6 cm.  New enhancing nodule along the right margin measuring 0.7 x 0.6 cm.  No mediastinal or hilar lymphadenopathy.  New bandlike opacity in the lateral aspect of the right apex no suspicious nodularity but focus reevaluation is recommended on subsequent CT chest with contrast.  Stable sub-6 mm left lower lobe pulmonary nodule.  10 x 6.2 cm left adnexal mass.  Stable 3.3 cm right ovarian cyst.    10/21/2024-no evidence of metastatic disease on the bone scan.    Interval history  Patient presents to the clinic today for follow-up.  She continues to tolerate Faslodex well.  She denies any new complaints at this time      The following portions of the patient's history were reviewed and updated as appropriate: allergies, current medications, past family history, past medical history, past social history, past surgical history, and problem list.    Past Medical History:   Diagnosis Date    Anemia in neoplastic disease     Arthritis     Arthritis of back     Arthritis of neck     Breast cancer     Left    CVA (cerebral vascular accident)     Encounter for long-term (current) use of other medications 06/22/2021    Fracture, fibula     Fracture, finger     Frozen shoulder     Hip arthrosis 01/17    Hip pain     RIGHT HIP... CYST    History of fracture of leg 1987    History of radiation therapy     LAST TREATMENT      Hypertension     Knee swelling     Limited joint range of motion (ROM)     RIGHT HIP    Low back strain     Periarthritis of shoulder     Rotator cuff syndrome 6/22    Skin sore     OPEN SORE LEFT BREAST    Syncope     Vertigo         Past Surgical History:   Procedure Laterality Date    AXILLARY LYMPH NODE BIOPSY/EXCISION Right     LYMPH NODE UNDER RIGHT  ARM-MALIGNANT (DOUBLE MASTECTOMY)    BREAST BIOPSY Left     MALIGNANT    FRACTURE SURGERY  1987    Leg    HARDWARE REMOVAL      INCISION AND DRAINAGE LEG Left 06/30/2022    Procedure: INCISION AND DRAINAGE left ankle;  Surgeon: eKnneth Tran MD;  Location: [facility] MAIN OR;  Service: Orthopedics;  Laterality: Left;    JOINT REPLACEMENT Bilateral mar 2020,july 2021    hips    MASTECTOMY W/ SENTINEL NODE BIOPSY Bilateral 09/16/2019    Procedure: BILATERLA MODIFIED RADICAL MASTECTOMY WITH BILATERAL SENTINEL LYMPH NODE BIOPSY;  Surgeon: Joby Barron Jr., MD;  Location: [facility] MAIN OR;  Service: General    TOTAL HIP ARTHROPLASTY Right 03/02/2020    Procedure: RIGHT TOTAL HIP ARTHROPLASTY NATALY NAVIGATION;  Surgeon: Luis M Leonard MD;  Location: [facility] MAIN OR;  Service: Orthopedics;  Laterality: Right;    TOTAL HIP ARTHROPLASTY Left 07/20/2021    Procedure: Posterior LEFT TOTAL HIP ARTHROPLASTY NATALY NAVIGATION;  Surgeon: Luis M Leonard MD;  Location: [facility] MAIN OR;  Service: Orthopedics;  Laterality: Left;    VENOUS ACCESS DEVICE (PORT) INSERTION Right 02/01/2019    Procedure: INSERTION VENOUS ACCESS DEVICE;  Surgeon: Joby Barron Jr., MD;  Location: [facility];  Service: General    VENOUS ACCESS DEVICE (PORT) REMOVAL N/A 10/30/2019    Procedure: Mediport Removal;  Surgeon: Joby Barron Jr., MD;  Location: [facility] OR;  Service: General        Family History   Problem Relation Age of Onset    Lung cancer Father     Irritable bowel syndrome Father     Cancer Mother     Breast cancer Mother     Liver disease Mother     Breast cancer Sister 48    Breast cancer Maternal Grandmother     Breast cancer Paternal Grandmother     Breast cancer Maternal Uncle     Malig Hyperthermia Neg Hx         Social History     Socioeconomic History    Marital status:      Spouse name: Cruz    Number of children: 0   Tobacco Use    Smoking status: Never    Smokeless tobacco: Never   Vaping Use    Vaping  status: Never Used   Substance and Sexual Activity    Alcohol use: Yes     Comment: occasional    Drug use: No    Sexual activity: Not Currently     Partners: Male     Birth control/protection: Abstinence        OB History          0    Para   0    Term   0       0    AB   0    Living   0         SAB   0    IAB   0    Ectopic   0    Molar   0    Multiple   0    Live Births   0                 Allergies   Allergen Reactions    Benadryl [Diphenhydramine] Itching     INCREASES BLOOD PRESSURE    Erythromycin GI Intolerance    Levaquin [Levofloxacin] GI Intolerance    Penicillins GI Intolerance            Review of Systems  Review of systems mentioned HPI otherwise negative    Objective   not currently breastfeeding.   Physical Exam  Vitals reviewed.   Constitutional:       Appearance: Normal appearance. She is normal weight.   HENT:      Nose: Nose normal.   Eyes:      Conjunctiva/sclera: Conjunctivae normal.   Cardiovascular:      Rate and Rhythm: Normal rate.   Pulmonary:      Effort: Pulmonary effort is normal.   Musculoskeletal:      Cervical back: Normal range of motion.   Skin:     General: Skin is warm.   Neurological:      General: No focal deficit present.      Mental Status: She is alert.   Psychiatric:         Mood and Affect: Mood normal.         Behavior: Behavior normal.         Thought Content: Thought content normal.         Judgment: Judgment normal.           Lab on 10/25/2024   Component Date Value Ref Range Status    WBC 10/25/2024 4.37  3.40 - 10.80 10*3/mm3 Final    RBC 10/25/2024 4.77  3.77 - 5.28 10*6/mm3 Final    Hemoglobin 10/25/2024 14.3  12.0 - 15.9 g/dL Final    Hematocrit 10/25/2024 44.7  34.0 - 46.6 % Final    MCV 10/25/2024 93.7  79.0 - 97.0 fL Final    MCH 10/25/2024 30.0  26.6 - 33.0 pg Final    MCHC 10/25/2024 32.0  31.5 - 35.7 g/dL Final    RDW 10/25/2024 13.7  12.3 - 15.4 % Final    RDW-SD 10/25/2024 47.0  37.0 - 54.0 fl Final    MPV 10/25/2024 8.7  6.0 - 12.0 fL Final     Platelets 10/25/2024 183  140 - 450 10*3/mm3 Final    Neutrophil % 10/25/2024 61.8  42.7 - 76.0 % Final    Lymphocyte % 10/25/2024 25.2  19.6 - 45.3 % Final    Monocyte % 10/25/2024 8.5  5.0 - 12.0 % Final    Eosinophil % 10/25/2024 3.2  0.3 - 6.2 % Final    Basophil % 10/25/2024 1.1  0.0 - 1.5 % Final    Immature Grans % 10/25/2024 0.2  0.0 - 0.5 % Final    Neutrophils, Absolute 10/25/2024 2.70  1.70 - 7.00 10*3/mm3 Final    Lymphocytes, Absolute 10/25/2024 1.10  0.70 - 3.10 10*3/mm3 Final    Monocytes, Absolute 10/25/2024 0.37  0.10 - 0.90 10*3/mm3 Final    Eosinophils, Absolute 10/25/2024 0.14  0.00 - 0.40 10*3/mm3 Final    Basophils, Absolute 10/25/2024 0.05  0.00 - 0.20 10*3/mm3 Final    Immature Grans, Absolute 10/25/2024 0.01  0.00 - 0.05 10*3/mm3 Final    nRBC 10/25/2024 0.0  0.0 - 0.2 /100 WBC Final   Lab on 09/27/2024   Component Date Value Ref Range Status    Glucose 09/27/2024 140 (H)  65 - 99 mg/dL Final    BUN 09/27/2024 16  8 - 23 mg/dL Final    Creatinine 09/27/2024 0.95  0.57 - 1.00 mg/dL Final    Sodium 09/27/2024 140  136 - 145 mmol/L Final    Potassium 09/27/2024 4.1  3.5 - 5.2 mmol/L Final    Chloride 09/27/2024 104  98 - 107 mmol/L Final    CO2 09/27/2024 25.0  22.0 - 29.0 mmol/L Final    Calcium 09/27/2024 9.4  8.6 - 10.5 mg/dL Final    Total Protein 09/27/2024 6.6  6.0 - 8.5 g/dL Final    Albumin 09/27/2024 4.2  3.5 - 5.2 g/dL Final    ALT (SGPT) 09/27/2024 38 (H)  1 - 33 U/L Final    AST (SGOT) 09/27/2024 33 (H)  1 - 32 U/L Final    Alkaline Phosphatase 09/27/2024 93  39 - 117 U/L Final    Total Bilirubin 09/27/2024 0.5  0.0 - 1.2 mg/dL Final    Globulin 09/27/2024 2.4  gm/dL Final    A/G Ratio 09/27/2024 1.8  g/dL Final    BUN/Creatinine Ratio 09/27/2024 16.8  7.0 - 25.0 Final    Anion Gap 09/27/2024 11.0  5.0 - 15.0 mmol/L Final    eGFR 09/27/2024 66.2  >60.0 mL/min/1.73 Final    WBC 09/27/2024 3.89  3.40 - 10.80 10*3/mm3 Final    RBC 09/27/2024 4.69  3.77 - 5.28 10*6/mm3 Final     Hemoglobin 09/27/2024 14.4  12.0 - 15.9 g/dL Final    Hematocrit 09/27/2024 44.7  34.0 - 46.6 % Final    MCV 09/27/2024 95.3  79.0 - 97.0 fL Final    MCH 09/27/2024 30.7  26.6 - 33.0 pg Final    MCHC 09/27/2024 32.2  31.5 - 35.7 g/dL Final    RDW 09/27/2024 13.6  12.3 - 15.4 % Final    RDW-SD 09/27/2024 47.4  37.0 - 54.0 fl Final    MPV 09/27/2024 8.8  6.0 - 12.0 fL Final    Platelets 09/27/2024 166  140 - 450 10*3/mm3 Final    Neutrophil % 09/27/2024 65.8  42.7 - 76.0 % Final    Lymphocyte % 09/27/2024 24.7  19.6 - 45.3 % Final    Monocyte % 09/27/2024 6.7  5.0 - 12.0 % Final    Eosinophil % 09/27/2024 1.5  0.3 - 6.2 % Final    Basophil % 09/27/2024 1.0  0.0 - 1.5 % Final    Immature Grans % 09/27/2024 0.3  0.0 - 0.5 % Final    Neutrophils, Absolute 09/27/2024 2.56  1.70 - 7.00 10*3/mm3 Final    Lymphocytes, Absolute 09/27/2024 0.96  0.70 - 3.10 10*3/mm3 Final    Monocytes, Absolute 09/27/2024 0.26  0.10 - 0.90 10*3/mm3 Final    Eosinophils, Absolute 09/27/2024 0.06  0.00 - 0.40 10*3/mm3 Final    Basophils, Absolute 09/27/2024 0.04  0.00 - 0.20 10*3/mm3 Final    Immature Grans, Absolute 09/27/2024 0.01  0.00 - 0.05 10*3/mm3 Final    nRBC 09/27/2024 0.0  0.0 - 0.2 /100 WBC Final        NM Bone Scan Whole Body    Result Date: 10/22/2024  No scintigraphic evidence of osseous metastatic disease.   This report was finalized on 10/22/2024 3:03 PM by Dr. Hardeep Brenner M.D on Workstation: [id number]          Assessment & Plan       *Metastatic ER positive breast cancer  Initially presented with neglected breast in 2019 treated with neoadjuvant Adriamycin and Cytoxan followed by Taxol followed by bilateral mastectomies and extra lymph node dissection and adjuvant radiation.  Started on adjuvant Femara after completion of chemotherapy surgery and radiation in 2019  Metastatic recurrence of disease in May 2021 and hence Ribociclib initiated.  Subsequent acute hepatitis from Ribociclib and hence Ribociclib and Femara  discontinued  Started Aromasin in March 2022  Metastatic disease recurrence with pericardial mass in January 2024 biopsy to be ER positive WA positive and HER2 0, [facility] demonstrates ESR 1 mutation  Started back on Ribociclib and continued Aromasin in January 2024  Significant elevation in LFTs after initiation of Ribociclib resulting in discontinuation of all treatment in March 2024  She was also initiated on steroids to help with the hepatitis.  Palliative radiation to the mediastinal mass completed in June 2024  CT of the chest abdomen pelvis from June 2024 shows decrease in size of the mass.  Initiated Faslodex in July 2024  CT of the chest abdomen and pelvis and bone scan 10/21/2024 with stable disease except for new enhancing nodule at the right margin of the pericardial mass.  Continue Faslodex at this time  Tumors markers q 8 weeks     *Ribociclib induced hepatitis  Patient has developed increase in LFTs on both occasions after use of Ribociclib.  This has been permanently discontinued  Patient has also been started on prednisone after the most recent acute hepatitis from Ribociclib  Currently she is on a 5 mg dose of prednisone  Recommend that she decrease this to 2.5 mg for the next week and subsequently discontinue.  LFTs will be monitored closely while she continues on Faslodex only and will not initiate Ribociclib.  In the future if need be we could consider using Ibrance at the lowest dose to see if she would tolerate this.  He is normal today.    *Ovarian cyst  Gradual increase in size of the ovarian cyst on the left.  Continue to monitor with repeat scans.    *Atrial fibrillation  Continue follow-up with Dr. Danni Odell with cardiology  Heart rate normal at 80  Continues on metoprolol.  Continue Eliquis    *Lymphedema of left upper extremity  Continue follow-up with lymphedema clinic    *Hypertension  Blood pressure well-controlled at BP: 109/74     PLAN  Proceed with Faslodex today  Tumor markers every  other cycle of Faslodex  CT of the chest abdomen and pelvis and bone scan in 16 weeks  APRN in 8 weeks      Patient is on medications requiring close monitoring for toxicities.  Reviewed CT of the chest abdomen and pelvis and bone scan images independently and interpreted them independently.  Discussed with radiology regarding the CT scans.

## 2024-10-26 LAB — CANCER AG27-29 SERPL-ACNC: 24.1 U/ML (ref 0–38.6)

## 2024-11-20 RX ORDER — LAMOTRIGINE 25 MG/1
500 TABLET ORAL ONCE
Status: CANCELLED | OUTPATIENT
Start: 2024-11-22

## 2024-11-22 ENCOUNTER — LAB (OUTPATIENT)
Dept: LAB | Facility: HOSPITAL | Age: 64
End: 2024-11-22
Payer: MEDICARE

## 2024-11-22 ENCOUNTER — INFUSION (OUTPATIENT)
Dept: ONCOLOGY | Facility: HOSPITAL | Age: 64
End: 2024-11-22
Payer: MEDICARE

## 2024-11-22 DIAGNOSIS — Z17.0 MALIGNANT NEOPLASM OF OVERLAPPING SITES OF LEFT BREAST IN FEMALE, ESTROGEN RECEPTOR POSITIVE: ICD-10-CM

## 2024-11-22 DIAGNOSIS — C50.812 MALIGNANT NEOPLASM OF OVERLAPPING SITES OF LEFT BREAST IN FEMALE, ESTROGEN RECEPTOR POSITIVE: Primary | ICD-10-CM

## 2024-11-22 DIAGNOSIS — Z17.0 MALIGNANT NEOPLASM OF OVERLAPPING SITES OF LEFT BREAST IN FEMALE, ESTROGEN RECEPTOR POSITIVE: Primary | ICD-10-CM

## 2024-11-22 DIAGNOSIS — C50.812 MALIGNANT NEOPLASM OF OVERLAPPING SITES OF LEFT BREAST IN FEMALE, ESTROGEN RECEPTOR POSITIVE: ICD-10-CM

## 2024-11-22 LAB
ALBUMIN SERPL-MCNC: 4.2 G/DL (ref 3.5–5.2)
ALBUMIN/GLOB SERPL: 1.8 G/DL
ALP SERPL-CCNC: 91 U/L (ref 39–117)
ALT SERPL W P-5'-P-CCNC: 17 U/L (ref 1–33)
ANION GAP SERPL CALCULATED.3IONS-SCNC: 10.2 MMOL/L (ref 5–15)
AST SERPL-CCNC: 25 U/L (ref 1–32)
BASOPHILS # BLD AUTO: 0.05 10*3/MM3 (ref 0–0.2)
BASOPHILS NFR BLD AUTO: 1.1 % (ref 0–1.5)
BILIRUB SERPL-MCNC: 0.3 MG/DL (ref 0–1.2)
BUN SERPL-MCNC: 14 MG/DL (ref 8–23)
BUN/CREAT SERPL: 15.1 (ref 7–25)
CALCIUM SPEC-SCNC: 9.3 MG/DL (ref 8.6–10.5)
CHLORIDE SERPL-SCNC: 103 MMOL/L (ref 98–107)
CO2 SERPL-SCNC: 27.8 MMOL/L (ref 22–29)
CREAT SERPL-MCNC: 0.93 MG/DL (ref 0.57–1)
DEPRECATED RDW RBC AUTO: 45.9 FL (ref 37–54)
EGFRCR SERPLBLD CKD-EPI 2021: 67.9 ML/MIN/1.73
EOSINOPHIL # BLD AUTO: 0.12 10*3/MM3 (ref 0–0.4)
EOSINOPHIL NFR BLD AUTO: 2.7 % (ref 0.3–6.2)
ERYTHROCYTE [DISTWIDTH] IN BLOOD BY AUTOMATED COUNT: 13.5 % (ref 12.3–15.4)
GLOBULIN UR ELPH-MCNC: 2.4 GM/DL
GLUCOSE SERPL-MCNC: 78 MG/DL (ref 65–99)
HCT VFR BLD AUTO: 43.9 % (ref 34–46.6)
HGB BLD-MCNC: 14.1 G/DL (ref 12–15.9)
IMM GRANULOCYTES # BLD AUTO: 0.02 10*3/MM3 (ref 0–0.05)
IMM GRANULOCYTES NFR BLD AUTO: 0.4 % (ref 0–0.5)
LYMPHOCYTES # BLD AUTO: 1.05 10*3/MM3 (ref 0.7–3.1)
LYMPHOCYTES NFR BLD AUTO: 23.3 % (ref 19.6–45.3)
MCH RBC QN AUTO: 29.9 PG (ref 26.6–33)
MCHC RBC AUTO-ENTMCNC: 32.1 G/DL (ref 31.5–35.7)
MCV RBC AUTO: 93 FL (ref 79–97)
MONOCYTES # BLD AUTO: 0.49 10*3/MM3 (ref 0.1–0.9)
MONOCYTES NFR BLD AUTO: 10.9 % (ref 5–12)
NEUTROPHILS NFR BLD AUTO: 2.78 10*3/MM3 (ref 1.7–7)
NEUTROPHILS NFR BLD AUTO: 61.6 % (ref 42.7–76)
NRBC BLD AUTO-RTO: 0 /100 WBC (ref 0–0.2)
PLATELET # BLD AUTO: 176 10*3/MM3 (ref 140–450)
PMV BLD AUTO: 8.3 FL (ref 6–12)
POTASSIUM SERPL-SCNC: 3.8 MMOL/L (ref 3.5–5.2)
PROT SERPL-MCNC: 6.6 G/DL (ref 6–8.5)
RBC # BLD AUTO: 4.72 10*6/MM3 (ref 3.77–5.28)
SODIUM SERPL-SCNC: 141 MMOL/L (ref 136–145)
WBC NRBC COR # BLD AUTO: 4.51 10*3/MM3 (ref 3.4–10.8)

## 2024-11-22 PROCEDURE — 80053 COMPREHEN METABOLIC PANEL: CPT

## 2024-11-22 PROCEDURE — 96402 CHEMO HORMON ANTINEOPL SQ/IM: CPT

## 2024-11-22 PROCEDURE — 85025 COMPLETE CBC W/AUTO DIFF WBC: CPT

## 2024-11-22 PROCEDURE — 25010000002 FULVESTRANT PER 25 MG: Performed by: NURSE PRACTITIONER

## 2024-11-22 PROCEDURE — 36415 COLL VENOUS BLD VENIPUNCTURE: CPT

## 2024-11-22 RX ORDER — LAMOTRIGINE 25 MG/1
500 TABLET ORAL ONCE
Status: COMPLETED | OUTPATIENT
Start: 2024-11-22 | End: 2024-11-22

## 2024-11-22 RX ADMIN — FULVESTRANT 500 MG: 50 INJECTION INTRAMUSCULAR at 08:25

## 2024-12-02 RX ORDER — APIXABAN 5 MG/1
5 TABLET, FILM COATED ORAL EVERY 12 HOURS
Qty: 60 TABLET | Refills: 5 | Status: SHIPPED | OUTPATIENT
Start: 2024-12-02

## 2024-12-18 NOTE — PROGRESS NOTES
Subjective   Marylu Georges is a 64 y.o. female.  With metastatic ER positive breast cancer.    History of Present Illness     Patient is a postmenopausal 64-year-old  lady who initially presented with a neglected left breast with axillary lymphadenopathy which was measuring up to 9 cm.  The tumor was fixed at the time of presentation.  She was treated with neoadjuvant Adriamycin and Cytoxan followed by Taxol in 2019 and subsequently underwent adjuvant radiation to the left chest wall and axilla.  Placed on adjuvant Femara.      PET/CT on 5/19/2021 which showed 2.6 x 1 cm enlarged lymph node in the right costophrenic fat pad which was new.  SUV 6.8.  Findings consistent with localized metastatic disease.  No other foci of pathological hypermetabolic some identified in the chest.  7 x 4.8 cm unilocular cyst in the left adnexa most likely arises from the ovary.  Slight increase in size since previous scan when it measured 5.5 cm.  The cyst is photopenic supporting a benign etiology.    Patient was initiated on Ribociclib in May 2021 and Femara continued.  September 2021 it was noted that LFTs were elevated and Ribociclib and Femara were placed on hold.  CT chest performed in November 2021 showed resolution of the cardiophrenic angle lymph node.    Resumed Femara only in November 2021.    Patient was seen again 12/29/2021 and had persistent elevation of LFTs although not any worse compared to November 2021.  In fact LFTs had improved with an ALT of 326 and AST of 167.  Due to persistent elevation of the LFTs Femara was discontinued.    1/25/2022-improvement in LFTs with ALT of 90 and AST of 55.  Patient also had liver biopsy which was showing hepatitis.    3/7/2022-patient was initiated on Aromasin 25 mg daily.    In December 2023 patient complained of intermittent chest pain.  This was further evaluated with a CT angiogram of the chest performed on 12/29/2023.  There was an irregular heterogeneous anterior  pericardial mass which extends into the anterior chest wall measuring 6.5 cm transversely and on coronal series 6.5 x 5.2 cm.  There is thickening of the pericardium along the base of the mass and adjacent to the mass as well.  Appearance is suspicious for metastatic disease versus primary malignancy.  Enlarged nodes at both epicardial fat pads largest measuring 1.4 x 0.9 cm.  No evidence of pulmonary embolism.    1/23/2024-PET/CT with enlarging intensely avid anterior pericardial soft tissue mass representing patient's known malignancy.  Adjacent cardiophrenic soft tissue nodules and lymph nodes which are also larger when compared to September 2022 scan.  This raises concern for metastatic disease.  Scattered bilateral subcentimeter pulmonary nodules.  Focal uptake within the pubic symphysis with a focus of osteolysis and apparent mild widening increased from September 2022.  Metastatic disease would be atypical and underlying synovitis or infection cannot be excluded.    Adnexal cyst has increased in size since September 2022 and now measures 6.2 x 9.5 cm.    1/15/2024 biopsy of the mediastinal mass was consistent with metastatic breast cancer.  ER +81 to 90% strong  IA +2% weak  HER2 negative, score 0    Tempus NGS with ESR 1 mutation.    Ribociclib was resumed in January 2024 and LFTs monitored closely.    In March 2024 LFTs started to spike up and subsequently Ribociclib as well as Aromasin was discontinued.     She completed radiation to the metastatic deposit in front of the pericardium in June 2024.    Initiated Faslodex in July 2024.    6/26/2024-CT of the chest abdomen and pelvis  Slight decrease in size of the anterior pericardial mass  Small right pelvic cyst appears stable when compared to 9/7/2022.  Larger left pelvic cyst measures 10.5 x 6.4 cm which has increased in size since September 2022.  Further evaluation is suggested.  If conservative management is elected then short-term follow-up with CT  pelvis recommended.  Status post bilateral mastectomies and bilateral axillary node dissection.      10/21/2024-CT of the chest abdomen and pelvis-decrease in size of the anterior pericardial mass measuring 5.5 x 1.2 cm, previously 5.6 x 1.6 cm.  New enhancing nodule along the right margin measuring 0.7 x 0.6 cm.  No mediastinal or hilar lymphadenopathy.  New bandlike opacity in the lateral aspect of the right apex no suspicious nodularity but focus reevaluation is recommended on subsequent CT chest with contrast.  Stable sub-6 mm left lower lobe pulmonary nodule.  10 x 6.2 cm left adnexal mass.  Stable 3.3 cm right ovarian cyst.    10/21/2024-no evidence of metastatic disease on the bone scan.    Interval history  The patient returns today for labs and Faslodex.  She continues to tolerate Faslodex well.  Does have occasional nausea, controlled with Compazine.  Denies fever, chills, vomiting.  Denies new or worsening pain.  No new concerns today.    The following portions of the patient's history were reviewed and updated as appropriate: allergies, current medications, past family history, past medical history, past social history, past surgical history, and problem list.    Past Medical History:   Diagnosis Date    Anemia in neoplastic disease     Arthritis     Arthritis of back     Arthritis of neck     Breast cancer     Left    CVA (cerebral vascular accident)     Encounter for long-term (current) use of other medications 06/22/2021    Fracture, fibula     Fracture, finger     Frozen shoulder     Hip arthrosis 01/17    Hip pain     RIGHT HIP... CYST    History of fracture of leg 1987    History of radiation therapy     LAST TREATMENT      Hypertension     Knee swelling     Limited joint range of motion (ROM)     RIGHT HIP    Low back strain     Periarthritis of shoulder     Rotator cuff syndrome 6/22    Skin sore     OPEN SORE LEFT BREAST    Syncope     Vertigo         Past Surgical History:   Procedure  Laterality Date    AXILLARY LYMPH NODE BIOPSY/EXCISION Right     LYMPH NODE UNDER RIGHT ARM-MALIGNANT (DOUBLE MASTECTOMY)    BREAST BIOPSY Left     MALIGNANT    FRACTURE SURGERY  1987    Leg    HARDWARE REMOVAL      INCISION AND DRAINAGE LEG Left 06/30/2022    Procedure: INCISION AND DRAINAGE left ankle;  Surgeon: Kenneth Tran MD;  Location: [facility] OR;  Service: Orthopedics;  Laterality: Left;    JOINT REPLACEMENT Bilateral mar 2020,july 2021    hips    MASTECTOMY W/ SENTINEL NODE BIOPSY Bilateral 09/16/2019    Procedure: BILATERLA MODIFIED RADICAL MASTECTOMY WITH BILATERAL SENTINEL LYMPH NODE BIOPSY;  Surgeon: Joby Barron Jr., MD;  Location: [facility] OR;  Service: General    TOTAL HIP ARTHROPLASTY Right 03/02/2020    Procedure: RIGHT TOTAL HIP ARTHROPLASTY NATALY NAVIGATION;  Surgeon: Luis M Leonard MD;  Location: [facility] OR;  Service: Orthopedics;  Laterality: Right;    TOTAL HIP ARTHROPLASTY Left 07/20/2021    Procedure: Posterior LEFT TOTAL HIP ARTHROPLASTY NATALY NAVIGATION;  Surgeon: Luis M Leonard MD;  Location: [facility] OR;  Service: Orthopedics;  Laterality: Left;    VENOUS ACCESS DEVICE (PORT) INSERTION Right 02/01/2019    Procedure: INSERTION VENOUS ACCESS DEVICE;  Surgeon: Joby Barron Jr., MD;  Location: [facility];  Service: General    VENOUS ACCESS DEVICE (PORT) REMOVAL N/A 10/30/2019    Procedure: Mediport Removal;  Surgeon: Joby Barron Jr., MD;  Location: [facility] OR;  Service: General        Family History   Problem Relation Age of Onset    Lung cancer Father     Irritable bowel syndrome Father     Cancer Mother     Breast cancer Mother     Liver disease Mother     Breast cancer Sister 48    Breast cancer Maternal Grandmother     Breast cancer Paternal Grandmother     Breast cancer Maternal Uncle     Malig Hyperthermia Neg Hx         Social History     Socioeconomic History    Marital status:      Spouse name: Cruz    Number of children: 0  "  Tobacco Use    Smoking status: Never    Smokeless tobacco: Never   Vaping Use    Vaping status: Never Used   Substance and Sexual Activity    Alcohol use: Yes     Comment: occasional    Drug use: No    Sexual activity: Not Currently     Partners: Male     Birth control/protection: Abstinence        OB History          0    Para   0    Term   0       0    AB   0    Living   0         SAB   0    IAB   0    Ectopic   0    Molar   0    Multiple   0    Live Births   0                 Allergies   Allergen Reactions    Benadryl [Diphenhydramine] Itching     INCREASES BLOOD PRESSURE    Erythromycin GI Intolerance    Levaquin [Levofloxacin] GI Intolerance    Penicillins GI Intolerance      Review of Systems  Review of systems mentioned HPI otherwise negative    Objective   Blood pressure 131/82, pulse 84, temperature 98.6 °F (37 °C), temperature source Oral, resp. rate 16, height 165.1 cm (65\"), weight 79.7 kg (175 lb 12.8 oz), SpO2 99%, not currently breastfeeding.   Limited due to video visit  Physical Exam  Vitals reviewed.   Constitutional:       Appearance: Normal appearance. She is normal weight.   HENT:      Nose: Nose normal.   Eyes:      Conjunctiva/sclera: Conjunctivae normal.   Cardiovascular:      Rate and Rhythm: Normal rate.   Pulmonary:      Effort: Pulmonary effort is normal.   Musculoskeletal:      Cervical back: Normal range of motion.   Skin:     General: Skin is warm.   Neurological:      General: No focal deficit present.      Mental Status: She is alert.   Psychiatric:         Mood and Affect: Mood normal.         Behavior: Behavior normal.         Thought Content: Thought content normal.         Judgment: Judgment normal.           Lab on 2024   Component Date Value Ref Range Status    Glucose 2024 97  65 - 99 mg/dL Final    BUN 2024 13  8 - 23 mg/dL Final    Creatinine 2024 0.98  0.57 - 1.00 mg/dL Final    Sodium 2024 136  136 - 145 mmol/L Final    " Potassium 12/20/2024 4.2  3.5 - 5.2 mmol/L Final    Chloride 12/20/2024 101  98 - 107 mmol/L Final    CO2 12/20/2024 26.1  22.0 - 29.0 mmol/L Final    Calcium 12/20/2024 9.5  8.6 - 10.5 mg/dL Final    Total Protein 12/20/2024 7.1  6.0 - 8.5 g/dL Final    Albumin 12/20/2024 4.3  3.5 - 5.2 g/dL Final    ALT (SGPT) 12/20/2024 23  1 - 33 U/L Final    AST (SGOT) 12/20/2024 30  1 - 32 U/L Final    Alkaline Phosphatase 12/20/2024 96  39 - 117 U/L Final    Total Bilirubin 12/20/2024 0.4  0.0 - 1.2 mg/dL Final    Globulin 12/20/2024 2.8  gm/dL Final    A/G Ratio 12/20/2024 1.5  g/dL Final    BUN/Creatinine Ratio 12/20/2024 13.3  7.0 - 25.0 Final    Anion Gap 12/20/2024 8.9  5.0 - 15.0 mmol/L Final    eGFR 12/20/2024 63.8  >60.0 mL/min/1.73 Final    WBC 12/20/2024 4.72  3.40 - 10.80 10*3/mm3 Final    RBC 12/20/2024 5.05  3.77 - 5.28 10*6/mm3 Final    Hemoglobin 12/20/2024 15.0  12.0 - 15.9 g/dL Final    Hematocrit 12/20/2024 47.4 (H)  34.0 - 46.6 % Final    MCV 12/20/2024 93.9  79.0 - 97.0 fL Final    MCH 12/20/2024 29.7  26.6 - 33.0 pg Final    MCHC 12/20/2024 31.6  31.5 - 35.7 g/dL Final    RDW 12/20/2024 13.9  12.3 - 15.4 % Final    RDW-SD 12/20/2024 47.8  37.0 - 54.0 fl Final    MPV 12/20/2024 8.4  6.0 - 12.0 fL Final    Platelets 12/20/2024 188  140 - 450 10*3/mm3 Final    Neutrophil % 12/20/2024 61.1  42.7 - 76.0 % Final    Lymphocyte % 12/20/2024 26.1  19.6 - 45.3 % Final    Monocyte % 12/20/2024 8.1  5.0 - 12.0 % Final    Eosinophil % 12/20/2024 3.0  0.3 - 6.2 % Final    Basophil % 12/20/2024 1.3  0.0 - 1.5 % Final    Immature Grans % 12/20/2024 0.4  0.0 - 0.5 % Final    Neutrophils, Absolute 12/20/2024 2.89  1.70 - 7.00 10*3/mm3 Final    Lymphocytes, Absolute 12/20/2024 1.23  0.70 - 3.10 10*3/mm3 Final    Monocytes, Absolute 12/20/2024 0.38  0.10 - 0.90 10*3/mm3 Final    Eosinophils, Absolute 12/20/2024 0.14  0.00 - 0.40 10*3/mm3 Final    Basophils, Absolute 12/20/2024 0.06  0.00 - 0.20 10*3/mm3 Final    Immature  Grans, Absolute 12/20/2024 0.02  0.00 - 0.05 10*3/mm3 Final    nRBC 12/20/2024 0.0  0.0 - 0.2 /100 WBC Final   Lab on 11/22/2024   Component Date Value Ref Range Status    Glucose 11/22/2024 78  65 - 99 mg/dL Final    BUN 11/22/2024 14  8 - 23 mg/dL Final    Creatinine 11/22/2024 0.93  0.57 - 1.00 mg/dL Final    Sodium 11/22/2024 141  136 - 145 mmol/L Final    Potassium 11/22/2024 3.8  3.5 - 5.2 mmol/L Final    Chloride 11/22/2024 103  98 - 107 mmol/L Final    CO2 11/22/2024 27.8  22.0 - 29.0 mmol/L Final    Calcium 11/22/2024 9.3  8.6 - 10.5 mg/dL Final    Total Protein 11/22/2024 6.6  6.0 - 8.5 g/dL Final    Albumin 11/22/2024 4.2  3.5 - 5.2 g/dL Final    ALT (SGPT) 11/22/2024 17  1 - 33 U/L Final    AST (SGOT) 11/22/2024 25  1 - 32 U/L Final    Alkaline Phosphatase 11/22/2024 91  39 - 117 U/L Final    Total Bilirubin 11/22/2024 0.3  0.0 - 1.2 mg/dL Final    Globulin 11/22/2024 2.4  gm/dL Final    A/G Ratio 11/22/2024 1.8  g/dL Final    BUN/Creatinine Ratio 11/22/2024 15.1  7.0 - 25.0 Final    Anion Gap 11/22/2024 10.2  5.0 - 15.0 mmol/L Final    eGFR 11/22/2024 67.9  >60.0 mL/min/1.73 Final    WBC 11/22/2024 4.51  3.40 - 10.80 10*3/mm3 Final    RBC 11/22/2024 4.72  3.77 - 5.28 10*6/mm3 Final    Hemoglobin 11/22/2024 14.1  12.0 - 15.9 g/dL Final    Hematocrit 11/22/2024 43.9  34.0 - 46.6 % Final    MCV 11/22/2024 93.0  79.0 - 97.0 fL Final    MCH 11/22/2024 29.9  26.6 - 33.0 pg Final    MCHC 11/22/2024 32.1  31.5 - 35.7 g/dL Final    RDW 11/22/2024 13.5  12.3 - 15.4 % Final    RDW-SD 11/22/2024 45.9  37.0 - 54.0 fl Final    MPV 11/22/2024 8.3  6.0 - 12.0 fL Final    Platelets 11/22/2024 176  140 - 450 10*3/mm3 Final    Neutrophil % 11/22/2024 61.6  42.7 - 76.0 % Final    Lymphocyte % 11/22/2024 23.3  19.6 - 45.3 % Final    Monocyte % 11/22/2024 10.9  5.0 - 12.0 % Final    Eosinophil % 11/22/2024 2.7  0.3 - 6.2 % Final    Basophil % 11/22/2024 1.1  0.0 - 1.5 % Final    Immature Grans % 11/22/2024 0.4  0.0 - 0.5 %  Final    Neutrophils, Absolute 11/22/2024 2.78  1.70 - 7.00 10*3/mm3 Final    Lymphocytes, Absolute 11/22/2024 1.05  0.70 - 3.10 10*3/mm3 Final    Monocytes, Absolute 11/22/2024 0.49  0.10 - 0.90 10*3/mm3 Final    Eosinophils, Absolute 11/22/2024 0.12  0.00 - 0.40 10*3/mm3 Final    Basophils, Absolute 11/22/2024 0.05  0.00 - 0.20 10*3/mm3 Final    Immature Grans, Absolute 11/22/2024 0.02  0.00 - 0.05 10*3/mm3 Final    nRBC 11/22/2024 0.0  0.0 - 0.2 /100 WBC Final        No radiology results for the last 30 days.       Assessment & Plan       *Metastatic ER positive breast cancer  Initially presented with neglected breast in 2019 treated with neoadjuvant Adriamycin and Cytoxan followed by Taxol followed by bilateral mastectomies and extra lymph node dissection and adjuvant radiation.  Started on adjuvant Femara after completion of chemotherapy surgery and radiation in 2019  Metastatic recurrence of disease in May 2021 and hence Ribociclib initiated.  Subsequent acute hepatitis from Ribociclib and hence Ribociclib and Femara discontinued  Started Aromasin in March 2022  Metastatic disease recurrence with pericardial mass in January 2024 biopsy to be ER positive FL positive and HER2 0, Tempus demonstrates ESR 1 mutation  Started back on Ribociclib and continued Aromasin in January 2024  Significant elevation in LFTs after initiation of Ribociclib resulting in discontinuation of all treatment in March 2024  She was also initiated on steroids to help with the hepatitis.  Palliative radiation to the mediastinal mass completed in June 2024  CT of the chest abdomen pelvis from June 2024 shows decrease in size of the mass.  Initiated Faslodex in July 2024  CT of the chest abdomen and pelvis and bone scan 10/21/2024 with stable disease except for new enhancing nodule at the right margin of the pericardial mass.  12/20/2024: Continue Faslodex at this time.  Scheduled for scans in February.  Tumors markers q 8 weeks, pending  today.    *Ribociclib induced hepatitis  Patient has developed increase in LFTs on both occasions after use of Ribociclib.  This has been permanently discontinued  Patient has also been started on prednisone after the most recent acute hepatitis from Ribociclib  Currently she is on a 5 mg dose of prednisone  Recommend that she decrease this to 2.5 mg for the next week and subsequently discontinue.  LFTs will be monitored closely while she continues on Faslodex only and will not initiate Ribociclib.  In the future if need be we could consider using Ibrance at the lowest dose to see if she would tolerate this.    *Ovarian cyst  Gradual increase in size of the ovarian cyst on the left.  Continue to monitor with repeat scans.    *Atrial fibrillation  Continue follow-up with Dr. Danni Odell with cardiology  Heart rate normal at 80  Continues on metoprolol.  Continue Eliquis    *Lymphedema of left upper extremity  Continue follow-up with lymphedema clinic    *Hypertension  Blood pressure well-controlled at BP: 131/82     PLAN   Proceed with Faslodex today  Tumor markers every other cycle of Faslodex, pending today.  CT of the chest abdomen and pelvis and bone scan in 7 weeks  4 weeks Faslodex.   MD in 8 weeks for scan review and faslodex.    Patient is on medications requiring close monitoring for toxicities.      Patient is seen today for video visit through [email or web address].  Patient is located at [facility] office ([facility] [place] [place]) and I am located at [facility] office ([place]).    You have chosen to receive care through a telehealth visit.  Do you consent to use a video/audio connection for your medical care today? Yes

## 2024-12-19 RX ORDER — LAMOTRIGINE 25 MG/1
500 TABLET ORAL ONCE
Status: CANCELLED | OUTPATIENT
Start: 2024-12-20

## 2024-12-20 ENCOUNTER — TELEMEDICINE (OUTPATIENT)
Dept: ONCOLOGY | Facility: CLINIC | Age: 64
End: 2024-12-20
Payer: MEDICARE

## 2024-12-20 ENCOUNTER — INFUSION (OUTPATIENT)
Dept: ONCOLOGY | Facility: HOSPITAL | Age: 64
End: 2024-12-20
Payer: MEDICARE

## 2024-12-20 ENCOUNTER — LAB (OUTPATIENT)
Dept: LAB | Facility: HOSPITAL | Age: 64
End: 2024-12-20
Payer: MEDICARE

## 2024-12-20 VITALS
WEIGHT: 175.8 LBS | OXYGEN SATURATION: 99 % | SYSTOLIC BLOOD PRESSURE: 131 MMHG | HEIGHT: 65 IN | TEMPERATURE: 98.6 F | HEART RATE: 84 BPM | BODY MASS INDEX: 29.29 KG/M2 | DIASTOLIC BLOOD PRESSURE: 82 MMHG | RESPIRATION RATE: 16 BRPM

## 2024-12-20 DIAGNOSIS — Z17.0 MALIGNANT NEOPLASM OF OVERLAPPING SITES OF LEFT BREAST IN FEMALE, ESTROGEN RECEPTOR POSITIVE: Primary | ICD-10-CM

## 2024-12-20 DIAGNOSIS — C50.812 MALIGNANT NEOPLASM OF OVERLAPPING SITES OF LEFT BREAST IN FEMALE, ESTROGEN RECEPTOR POSITIVE: ICD-10-CM

## 2024-12-20 DIAGNOSIS — C50.812 MALIGNANT NEOPLASM OF OVERLAPPING SITES OF LEFT BREAST IN FEMALE, ESTROGEN RECEPTOR POSITIVE: Primary | ICD-10-CM

## 2024-12-20 DIAGNOSIS — Z79.899 HIGH RISK MEDICATION USE: ICD-10-CM

## 2024-12-20 DIAGNOSIS — Z17.0 MALIGNANT NEOPLASM OF OVERLAPPING SITES OF LEFT BREAST IN FEMALE, ESTROGEN RECEPTOR POSITIVE: ICD-10-CM

## 2024-12-20 LAB
ALBUMIN SERPL-MCNC: 4.3 G/DL (ref 3.5–5.2)
ALBUMIN/GLOB SERPL: 1.5 G/DL
ALP SERPL-CCNC: 96 U/L (ref 39–117)
ALT SERPL W P-5'-P-CCNC: 23 U/L (ref 1–33)
ANION GAP SERPL CALCULATED.3IONS-SCNC: 8.9 MMOL/L (ref 5–15)
AST SERPL-CCNC: 30 U/L (ref 1–32)
BASOPHILS # BLD AUTO: 0.06 10*3/MM3 (ref 0–0.2)
BASOPHILS NFR BLD AUTO: 1.3 % (ref 0–1.5)
BILIRUB SERPL-MCNC: 0.4 MG/DL (ref 0–1.2)
BUN SERPL-MCNC: 13 MG/DL (ref 8–23)
BUN/CREAT SERPL: 13.3 (ref 7–25)
CALCIUM SPEC-SCNC: 9.5 MG/DL (ref 8.6–10.5)
CANCER AG15-3 SERPL-ACNC: 19 U/ML
CHLORIDE SERPL-SCNC: 101 MMOL/L (ref 98–107)
CO2 SERPL-SCNC: 26.1 MMOL/L (ref 22–29)
CREAT SERPL-MCNC: 0.98 MG/DL (ref 0.57–1)
DEPRECATED RDW RBC AUTO: 47.8 FL (ref 37–54)
EGFRCR SERPLBLD CKD-EPI 2021: 63.8 ML/MIN/1.73
EOSINOPHIL # BLD AUTO: 0.14 10*3/MM3 (ref 0–0.4)
EOSINOPHIL NFR BLD AUTO: 3 % (ref 0.3–6.2)
ERYTHROCYTE [DISTWIDTH] IN BLOOD BY AUTOMATED COUNT: 13.9 % (ref 12.3–15.4)
GLOBULIN UR ELPH-MCNC: 2.8 GM/DL
GLUCOSE SERPL-MCNC: 97 MG/DL (ref 65–99)
HCT VFR BLD AUTO: 47.4 % (ref 34–46.6)
HGB BLD-MCNC: 15 G/DL (ref 12–15.9)
IMM GRANULOCYTES # BLD AUTO: 0.02 10*3/MM3 (ref 0–0.05)
IMM GRANULOCYTES NFR BLD AUTO: 0.4 % (ref 0–0.5)
LYMPHOCYTES # BLD AUTO: 1.23 10*3/MM3 (ref 0.7–3.1)
LYMPHOCYTES NFR BLD AUTO: 26.1 % (ref 19.6–45.3)
MCH RBC QN AUTO: 29.7 PG (ref 26.6–33)
MCHC RBC AUTO-ENTMCNC: 31.6 G/DL (ref 31.5–35.7)
MCV RBC AUTO: 93.9 FL (ref 79–97)
MONOCYTES # BLD AUTO: 0.38 10*3/MM3 (ref 0.1–0.9)
MONOCYTES NFR BLD AUTO: 8.1 % (ref 5–12)
NEUTROPHILS NFR BLD AUTO: 2.89 10*3/MM3 (ref 1.7–7)
NEUTROPHILS NFR BLD AUTO: 61.1 % (ref 42.7–76)
NRBC BLD AUTO-RTO: 0 /100 WBC (ref 0–0.2)
PLATELET # BLD AUTO: 188 10*3/MM3 (ref 140–450)
PMV BLD AUTO: 8.4 FL (ref 6–12)
POTASSIUM SERPL-SCNC: 4.2 MMOL/L (ref 3.5–5.2)
PROT SERPL-MCNC: 7.1 G/DL (ref 6–8.5)
RBC # BLD AUTO: 5.05 10*6/MM3 (ref 3.77–5.28)
SODIUM SERPL-SCNC: 136 MMOL/L (ref 136–145)
WBC NRBC COR # BLD AUTO: 4.72 10*3/MM3 (ref 3.4–10.8)

## 2024-12-20 PROCEDURE — 80053 COMPREHEN METABOLIC PANEL: CPT

## 2024-12-20 PROCEDURE — 85025 COMPLETE CBC W/AUTO DIFF WBC: CPT

## 2024-12-20 PROCEDURE — 86300 IMMUNOASSAY TUMOR CA 15-3: CPT | Performed by: INTERNAL MEDICINE

## 2024-12-20 PROCEDURE — 36415 COLL VENOUS BLD VENIPUNCTURE: CPT

## 2024-12-20 PROCEDURE — 25010000002 FULVESTRANT PER 25 MG: Performed by: NURSE PRACTITIONER

## 2024-12-20 PROCEDURE — 96402 CHEMO HORMON ANTINEOPL SQ/IM: CPT

## 2024-12-20 RX ORDER — LAMOTRIGINE 25 MG/1
500 TABLET ORAL ONCE
Status: COMPLETED | OUTPATIENT
Start: 2024-12-20 | End: 2024-12-20

## 2024-12-20 RX ADMIN — FULVESTRANT 500 MG: 50 INJECTION INTRAMUSCULAR at 10:47

## 2024-12-21 LAB — CANCER AG27-29 SERPL-ACNC: 20.6 U/ML (ref 0–38.6)

## 2025-01-02 RX ORDER — METOPROLOL SUCCINATE 50 MG/1
TABLET, EXTENDED RELEASE ORAL
Qty: 270 TABLET | Refills: 0 | Status: SHIPPED | OUTPATIENT
Start: 2025-01-02

## 2025-01-16 RX ORDER — LAMOTRIGINE 25 MG/1
500 TABLET ORAL ONCE
Status: CANCELLED | OUTPATIENT
Start: 2025-01-17

## 2025-01-17 ENCOUNTER — LAB (OUTPATIENT)
Dept: LAB | Facility: HOSPITAL | Age: 65
End: 2025-01-17
Payer: MEDICARE

## 2025-01-17 ENCOUNTER — INFUSION (OUTPATIENT)
Dept: ONCOLOGY | Facility: HOSPITAL | Age: 65
End: 2025-01-17
Payer: MEDICARE

## 2025-01-17 DIAGNOSIS — C50.812 MALIGNANT NEOPLASM OF OVERLAPPING SITES OF LEFT BREAST IN FEMALE, ESTROGEN RECEPTOR POSITIVE: Primary | ICD-10-CM

## 2025-01-17 DIAGNOSIS — Z17.0 MALIGNANT NEOPLASM OF OVERLAPPING SITES OF LEFT BREAST IN FEMALE, ESTROGEN RECEPTOR POSITIVE: Primary | ICD-10-CM

## 2025-01-17 DIAGNOSIS — Z17.0 MALIGNANT NEOPLASM OF OVERLAPPING SITES OF LEFT BREAST IN FEMALE, ESTROGEN RECEPTOR POSITIVE: ICD-10-CM

## 2025-01-17 DIAGNOSIS — C50.812 MALIGNANT NEOPLASM OF OVERLAPPING SITES OF LEFT BREAST IN FEMALE, ESTROGEN RECEPTOR POSITIVE: ICD-10-CM

## 2025-01-17 LAB
ALBUMIN SERPL-MCNC: 4 G/DL (ref 3.5–5.2)
ALBUMIN/GLOB SERPL: 1.7 G/DL
ALP SERPL-CCNC: 93 U/L (ref 39–117)
ALT SERPL W P-5'-P-CCNC: 15 U/L (ref 1–33)
ANION GAP SERPL CALCULATED.3IONS-SCNC: 11.1 MMOL/L (ref 5–15)
AST SERPL-CCNC: 25 U/L (ref 1–32)
BASOPHILS # BLD AUTO: 0.04 10*3/MM3 (ref 0–0.2)
BASOPHILS NFR BLD AUTO: 1 % (ref 0–1.5)
BILIRUB SERPL-MCNC: 0.4 MG/DL (ref 0–1.2)
BUN SERPL-MCNC: 12 MG/DL (ref 8–23)
BUN/CREAT SERPL: 14.3 (ref 7–25)
CALCIUM SPEC-SCNC: 9.4 MG/DL (ref 8.6–10.5)
CHLORIDE SERPL-SCNC: 108 MMOL/L (ref 98–107)
CO2 SERPL-SCNC: 24.9 MMOL/L (ref 22–29)
CREAT SERPL-MCNC: 0.84 MG/DL (ref 0.57–1)
DEPRECATED RDW RBC AUTO: 50 FL (ref 37–54)
EGFRCR SERPLBLD CKD-EPI 2021: 76.3 ML/MIN/1.73
EOSINOPHIL # BLD AUTO: 0.16 10*3/MM3 (ref 0–0.4)
EOSINOPHIL NFR BLD AUTO: 4.1 % (ref 0.3–6.2)
ERYTHROCYTE [DISTWIDTH] IN BLOOD BY AUTOMATED COUNT: 14.6 % (ref 12.3–15.4)
GLOBULIN UR ELPH-MCNC: 2.3 GM/DL
GLUCOSE SERPL-MCNC: 91 MG/DL (ref 65–99)
HCT VFR BLD AUTO: 42 % (ref 34–46.6)
HGB BLD-MCNC: 13.4 G/DL (ref 12–15.9)
IMM GRANULOCYTES # BLD AUTO: 0.01 10*3/MM3 (ref 0–0.05)
IMM GRANULOCYTES NFR BLD AUTO: 0.3 % (ref 0–0.5)
LYMPHOCYTES # BLD AUTO: 1.06 10*3/MM3 (ref 0.7–3.1)
LYMPHOCYTES NFR BLD AUTO: 27.1 % (ref 19.6–45.3)
MCH RBC QN AUTO: 29.8 PG (ref 26.6–33)
MCHC RBC AUTO-ENTMCNC: 31.9 G/DL (ref 31.5–35.7)
MCV RBC AUTO: 93.5 FL (ref 79–97)
MONOCYTES # BLD AUTO: 0.36 10*3/MM3 (ref 0.1–0.9)
MONOCYTES NFR BLD AUTO: 9.2 % (ref 5–12)
NEUTROPHILS NFR BLD AUTO: 2.28 10*3/MM3 (ref 1.7–7)
NEUTROPHILS NFR BLD AUTO: 58.3 % (ref 42.7–76)
NRBC BLD AUTO-RTO: 0 /100 WBC (ref 0–0.2)
PLATELET # BLD AUTO: 149 10*3/MM3 (ref 140–450)
PMV BLD AUTO: 8.2 FL (ref 6–12)
POTASSIUM SERPL-SCNC: 4.2 MMOL/L (ref 3.5–5.2)
PROT SERPL-MCNC: 6.3 G/DL (ref 6–8.5)
RBC # BLD AUTO: 4.49 10*6/MM3 (ref 3.77–5.28)
SODIUM SERPL-SCNC: 144 MMOL/L (ref 136–145)
WBC NRBC COR # BLD AUTO: 3.91 10*3/MM3 (ref 3.4–10.8)

## 2025-01-17 PROCEDURE — 85025 COMPLETE CBC W/AUTO DIFF WBC: CPT

## 2025-01-17 PROCEDURE — 36415 COLL VENOUS BLD VENIPUNCTURE: CPT

## 2025-01-17 PROCEDURE — 80053 COMPREHEN METABOLIC PANEL: CPT

## 2025-01-17 PROCEDURE — 25010000002 FULVESTRANT PER 25 MG: Performed by: NURSE PRACTITIONER

## 2025-01-17 PROCEDURE — 96402 CHEMO HORMON ANTINEOPL SQ/IM: CPT

## 2025-01-17 RX ORDER — LAMOTRIGINE 25 MG/1
500 TABLET ORAL ONCE
Status: COMPLETED | OUTPATIENT
Start: 2025-01-17 | End: 2025-01-17

## 2025-01-17 RX ADMIN — FULVESTRANT 500 MG: 50 INJECTION, SOLUTION INTRAMUSCULAR at 08:49

## 2025-02-07 ENCOUNTER — HOSPITAL ENCOUNTER (OUTPATIENT)
Dept: CT IMAGING | Facility: HOSPITAL | Age: 65
Discharge: HOME | End: 2025-02-07
Payer: MEDICARE

## 2025-02-07 ENCOUNTER — HOSPITAL ENCOUNTER (OUTPATIENT)
Dept: NUCLEAR MEDICINE | Facility: HOSPITAL | Age: 65
Discharge: HOME | End: 2025-02-07
Payer: MEDICARE

## 2025-02-07 ENCOUNTER — APPOINTMENT (OUTPATIENT)
Dept: NUCLEAR MEDICINE | Facility: HOSPITAL | Age: 65
End: 2025-02-07
Payer: MEDICARE

## 2025-02-07 DIAGNOSIS — Z17.0 MALIGNANT NEOPLASM OF OVERLAPPING SITES OF LEFT BREAST IN FEMALE, ESTROGEN RECEPTOR POSITIVE: ICD-10-CM

## 2025-02-07 DIAGNOSIS — C50.812 MALIGNANT NEOPLASM OF OVERLAPPING SITES OF LEFT BREAST IN FEMALE, ESTROGEN RECEPTOR POSITIVE: ICD-10-CM

## 2025-02-07 PROCEDURE — A9503 TC99M MEDRONATE: HCPCS | Performed by: INTERNAL MEDICINE

## 2025-02-07 PROCEDURE — 71260 CT THORAX DX C+: CPT

## 2025-02-07 PROCEDURE — 34310000005 TECHNETIUM MEDRONATE KIT: Performed by: INTERNAL MEDICINE

## 2025-02-07 PROCEDURE — 78306 BONE IMAGING WHOLE BODY: CPT

## 2025-02-07 PROCEDURE — 74177 CT ABD & PELVIS W/CONTRAST: CPT

## 2025-02-07 PROCEDURE — 25510000001 IOPAMIDOL 61 % SOLUTION: Performed by: INTERNAL MEDICINE

## 2025-02-07 RX ORDER — IOPAMIDOL 612 MG/ML
100 INJECTION, SOLUTION INTRAVASCULAR
Status: COMPLETED | OUTPATIENT
Start: 2025-02-07 | End: 2025-02-07

## 2025-02-07 RX ORDER — TC 99M MEDRONATE 20 MG/10ML
21.9 INJECTION, POWDER, LYOPHILIZED, FOR SOLUTION INTRAVENOUS
Status: COMPLETED | OUTPATIENT
Start: 2025-02-07 | End: 2025-02-07

## 2025-02-07 RX ADMIN — Medication 21.9 MILLICURIE: at 08:00

## 2025-02-07 RX ADMIN — IOPAMIDOL 85 ML: 612 INJECTION, SOLUTION INTRAVENOUS at 09:06

## 2025-02-14 ENCOUNTER — OFFICE VISIT (OUTPATIENT)
Dept: ONCOLOGY | Facility: CLINIC | Age: 65
End: 2025-02-14
Payer: MEDICARE

## 2025-02-14 ENCOUNTER — INFUSION (OUTPATIENT)
Dept: ONCOLOGY | Facility: HOSPITAL | Age: 65
End: 2025-02-14
Payer: MEDICARE

## 2025-02-14 ENCOUNTER — SPECIALTY PHARMACY (OUTPATIENT)
Dept: PHARMACY | Facility: HOSPITAL | Age: 65
End: 2025-02-14
Payer: MEDICARE

## 2025-02-14 ENCOUNTER — LAB (OUTPATIENT)
Dept: LAB | Facility: HOSPITAL | Age: 65
End: 2025-02-14
Payer: MEDICARE

## 2025-02-14 VITALS
WEIGHT: 175.6 LBS | SYSTOLIC BLOOD PRESSURE: 119 MMHG | BODY MASS INDEX: 29.26 KG/M2 | TEMPERATURE: 97.9 F | HEIGHT: 65 IN | OXYGEN SATURATION: 99 % | DIASTOLIC BLOOD PRESSURE: 81 MMHG

## 2025-02-14 DIAGNOSIS — C50.812 MALIGNANT NEOPLASM OF OVERLAPPING SITES OF LEFT BREAST IN FEMALE, ESTROGEN RECEPTOR POSITIVE: Primary | ICD-10-CM

## 2025-02-14 DIAGNOSIS — Z79.899 HIGH RISK MEDICATION USE: ICD-10-CM

## 2025-02-14 DIAGNOSIS — Z17.0 MALIGNANT NEOPLASM OF OVERLAPPING SITES OF LEFT BREAST IN FEMALE, ESTROGEN RECEPTOR POSITIVE: Primary | ICD-10-CM

## 2025-02-14 DIAGNOSIS — Z17.0 MALIGNANT NEOPLASM OF OVERLAPPING SITES OF LEFT BREAST IN FEMALE, ESTROGEN RECEPTOR POSITIVE: ICD-10-CM

## 2025-02-14 DIAGNOSIS — C50.812 MALIGNANT NEOPLASM OF OVERLAPPING SITES OF LEFT BREAST IN FEMALE, ESTROGEN RECEPTOR POSITIVE: ICD-10-CM

## 2025-02-14 LAB
ALBUMIN SERPL-MCNC: 4.5 G/DL (ref 3.5–5.2)
ALBUMIN/GLOB SERPL: 1.8 G/DL
ALP SERPL-CCNC: 98 U/L (ref 39–117)
ALT SERPL W P-5'-P-CCNC: 22 U/L (ref 1–33)
ANION GAP SERPL CALCULATED.3IONS-SCNC: 11.3 MMOL/L (ref 5–15)
AST SERPL-CCNC: 27 U/L (ref 1–32)
BASOPHILS # BLD AUTO: 0.03 10*3/MM3 (ref 0–0.2)
BASOPHILS NFR BLD AUTO: 0.8 % (ref 0–1.5)
BILIRUB SERPL-MCNC: 0.5 MG/DL (ref 0–1.2)
BUN SERPL-MCNC: 16 MG/DL (ref 8–23)
BUN/CREAT SERPL: 19.5 (ref 7–25)
CALCIUM SPEC-SCNC: 10 MG/DL (ref 8.6–10.5)
CANCER AG15-3 SERPL-ACNC: 20.5 U/ML
CHLORIDE SERPL-SCNC: 104 MMOL/L (ref 98–107)
CO2 SERPL-SCNC: 25.7 MMOL/L (ref 22–29)
CREAT SERPL-MCNC: 0.82 MG/DL (ref 0.57–1)
DEPRECATED RDW RBC AUTO: 48.6 FL (ref 37–54)
EGFRCR SERPLBLD CKD-EPI 2021: 78.5 ML/MIN/1.73
EOSINOPHIL # BLD AUTO: 0.19 10*3/MM3 (ref 0–0.4)
EOSINOPHIL NFR BLD AUTO: 5.2 % (ref 0.3–6.2)
ERYTHROCYTE [DISTWIDTH] IN BLOOD BY AUTOMATED COUNT: 14.2 % (ref 12.3–15.4)
GLOBULIN UR ELPH-MCNC: 2.5 GM/DL
GLUCOSE SERPL-MCNC: 109 MG/DL (ref 65–99)
HCT VFR BLD AUTO: 43.9 % (ref 34–46.6)
HGB BLD-MCNC: 14.2 G/DL (ref 12–15.9)
IMM GRANULOCYTES # BLD AUTO: 0 10*3/MM3 (ref 0–0.05)
IMM GRANULOCYTES NFR BLD AUTO: 0 % (ref 0–0.5)
LYMPHOCYTES # BLD AUTO: 0.93 10*3/MM3 (ref 0.7–3.1)
LYMPHOCYTES NFR BLD AUTO: 25.5 % (ref 19.6–45.3)
MCH RBC QN AUTO: 30.1 PG (ref 26.6–33)
MCHC RBC AUTO-ENTMCNC: 32.3 G/DL (ref 31.5–35.7)
MCV RBC AUTO: 93.2 FL (ref 79–97)
MONOCYTES # BLD AUTO: 0.23 10*3/MM3 (ref 0.1–0.9)
MONOCYTES NFR BLD AUTO: 6.3 % (ref 5–12)
NEUTROPHILS NFR BLD AUTO: 2.27 10*3/MM3 (ref 1.7–7)
NEUTROPHILS NFR BLD AUTO: 62.2 % (ref 42.7–76)
NRBC BLD AUTO-RTO: 0 /100 WBC (ref 0–0.2)
PLATELET # BLD AUTO: 152 10*3/MM3 (ref 140–450)
PMV BLD AUTO: 8 FL (ref 6–12)
POTASSIUM SERPL-SCNC: 4 MMOL/L (ref 3.5–5.2)
PROT SERPL-MCNC: 7 G/DL (ref 6–8.5)
RBC # BLD AUTO: 4.71 10*6/MM3 (ref 3.77–5.28)
SODIUM SERPL-SCNC: 141 MMOL/L (ref 136–145)
WBC NRBC COR # BLD AUTO: 3.65 10*3/MM3 (ref 3.4–10.8)

## 2025-02-14 PROCEDURE — 80053 COMPREHEN METABOLIC PANEL: CPT

## 2025-02-14 PROCEDURE — 96402 CHEMO HORMON ANTINEOPL SQ/IM: CPT

## 2025-02-14 PROCEDURE — 86300 IMMUNOASSAY TUMOR CA 15-3: CPT | Performed by: INTERNAL MEDICINE

## 2025-02-14 PROCEDURE — 25010000002 FULVESTRANT PER 25 MG: Performed by: INTERNAL MEDICINE

## 2025-02-14 PROCEDURE — 36415 COLL VENOUS BLD VENIPUNCTURE: CPT

## 2025-02-14 PROCEDURE — 85025 COMPLETE CBC W/AUTO DIFF WBC: CPT

## 2025-02-14 RX ORDER — LAMOTRIGINE 25 MG/1
500 TABLET ORAL ONCE
Status: COMPLETED | OUTPATIENT
Start: 2025-02-14 | End: 2025-02-14

## 2025-02-14 RX ORDER — LAMOTRIGINE 25 MG/1
500 TABLET ORAL ONCE
Status: CANCELLED | OUTPATIENT
Start: 2025-02-14

## 2025-02-14 RX ADMIN — FULVESTRANT 500 MG: 50 INJECTION INTRAMUSCULAR at 09:39

## 2025-02-14 NOTE — PROGRESS NOTES
Staff message rec from Pamela ZELAYA, Clinic RN-Dr Miguel would like to start pt on Ibrance 100 mg 3 out of 4 weeks. Increase as tolerated.    I have submitted the PA to [facility] [facility] through covermymeds. Currently waiting for a decision.    Pamela Aguila, RN sent to Gina Lopez, [facility]; Estelle Emmanuel, Pharmacy Technician  Planning to start Marylu on Ibrance 100mg 3 out of 4 weeks, plan to increase as tolerated    Let me know how I can help!  Pamela Emmanuel, Pharmacy Technician  02/14/25  09:51 EST

## 2025-02-14 NOTE — PROGRESS NOTES
Subjective   Marylu Georges is a 65 y.o. female.  With metastatic ER positive breast cancer.    History of Present Illness     Patient is a postmenopausal 65-year-old  lady who initially presented with a neglected left breast with axillary lymphadenopathy which was measuring up to 9 cm.  The tumor was fixed at the time of presentation.  She was treated with neoadjuvant Adriamycin and Cytoxan followed by Taxol in 2019 and subsequently underwent adjuvant radiation to the left chest wall and axilla.  Placed on adjuvant Femara.      PET/CT on 5/19/2021 which showed 2.6 x 1 cm enlarged lymph node in the right costophrenic fat pad which was new.  SUV 6.8.  Findings consistent with localized metastatic disease.  No other foci of pathological hypermetabolic some identified in the chest.  7 x 4.8 cm unilocular cyst in the left adnexa most likely arises from the ovary.  Slight increase in size since previous scan when it measured 5.5 cm.  The cyst is photopenic supporting a benign etiology.    Patient was initiated on Ribociclib in May 2021 and Femara continued.  September 2021 it was noted that LFTs were elevated and Ribociclib and Femara were placed on hold.  CT chest performed in November 2021 showed resolution of the cardiophrenic angle lymph node.    Resumed Femara only in November 2021.    Patient was seen again 12/29/2021 and had persistent elevation of LFTs although not any worse compared to November 2021.  In fact LFTs had improved with an ALT of 326 and AST of 167.  Due to persistent elevation of the LFTs Femara was discontinued.    1/25/2022-improvement in LFTs with ALT of 90 and AST of 55.  Patient also had liver biopsy which was showing hepatitis.    3/7/2022-patient was initiated on Aromasin 25 mg daily.    In December 2023 patient complained of intermittent chest pain.  This was further evaluated with a CT angiogram of the chest performed on 12/29/2023.  There was an irregular heterogeneous anterior  pericardial mass which extends into the anterior chest wall measuring 6.5 cm transversely and on coronal series 6.5 x 5.2 cm.  There is thickening of the pericardium along the base of the mass and adjacent to the mass as well.  Appearance is suspicious for metastatic disease versus primary malignancy.  Enlarged nodes at both epicardial fat pads largest measuring 1.4 x 0.9 cm.  No evidence of pulmonary embolism.    1/23/2024-PET/CT with enlarging intensely avid anterior pericardial soft tissue mass representing patient's known malignancy.  Adjacent cardiophrenic soft tissue nodules and lymph nodes which are also larger when compared to September 2022 scan.  This raises concern for metastatic disease.  Scattered bilateral subcentimeter pulmonary nodules.  Focal uptake within the pubic symphysis with a focus of osteolysis and apparent mild widening increased from September 2022.  Metastatic disease would be atypical and underlying synovitis or infection cannot be excluded.    Adnexal cyst has increased in size since September 2022 and now measures 6.2 x 9.5 cm.    1/15/2024 biopsy of the mediastinal mass was consistent with metastatic breast cancer.  ER +81 to 90% strong  CO +2% weak  HER2 negative, score 0    [facility] NGS with ESR 1 mutation.    Ribociclib was resumed in January 2024 and LFTs monitored closely.    In March 2024 LFTs started to spike up and subsequently Ribociclib as well as Aromasin was discontinued.     She completed radiation to the metastatic deposit in front of the pericardium in June 2024.    Initiated Faslodex in July 2024.    6/26/2024-CT of the chest abdomen and pelvis  Slight decrease in size of the anterior pericardial mass  Small right pelvic cyst appears stable when compared to 9/7/2022.  Larger left pelvic cyst measures 10.5 x 6.4 cm which has increased in size since September 2022.  Further evaluation is suggested.  If conservative management is elected then short-term follow-up with CT  pelvis recommended.  Status post bilateral mastectomies and bilateral axillary node dissection.      10/21/2024-CT of the chest abdomen and pelvis-decrease in size of the anterior pericardial mass measuring 5.5 x 1.2 cm, previously 5.6 x 1.6 cm.  New enhancing nodule along the right margin measuring 0.7 x 0.6 cm.  No mediastinal or hilar lymphadenopathy.  New bandlike opacity in the lateral aspect of the right apex no suspicious nodularity but focus reevaluation is recommended on subsequent CT chest with contrast.  Stable sub-6 mm left lower lobe pulmonary nodule.  10 x 6.2 cm left adnexal mass.  Stable 3.3 cm right ovarian cyst.    10/21/2024-no evidence of metastatic disease on the bone scan.    2/7/2025-CT of the chest abdomen and pelvis-new right internal mammary chain lymphadenopathy measuring 2 x 1 cm in greatest dimension.  No hilar lymphadenopathy.  Right epicardial fat pad along the pericardium is larger now measuring 1.9 x 1.4 cm, previously 7 mm.  No evidence of metastatic disease in the abdomen  Left adnexal cystic mass not significantly changed measuring 11 x 6.3 cm right adnexal cystic mass measuring 3.8 x 2.9 cm.  Previously 3.3 x 2.5 cm.    2/7/2025-bone scan without any evidence of metastatic disease.    Interval history  Kelsie returns today for follow-up.  She is reporting worsening spells of vertigo especially when she looks to the left.  However this is not impairing her ADLs or her ability to do things.  She is also reporting some intermittent nausea for which she has been using Zofran.  Headaches mostly the week before Faslodex is due which resolved with Tylenol.  Here to review the results of the CT scans and the bone scan.    The following portions of the patient's history were reviewed and updated as appropriate: allergies, current medications, past family history, past medical history, past social history, past surgical history, and problem list.    Past Medical History:   Diagnosis Date     Anemia in neoplastic disease     Arthritis     Arthritis of back     Arthritis of neck     Breast cancer     Left    CVA (cerebral vascular accident)     Encounter for long-term (current) use of other medications 06/22/2021    Fracture, fibula     Fracture, finger     Frozen shoulder     Hip arthrosis 01/17    Hip pain     RIGHT HIP... CYST    History of fracture of leg 1987    History of radiation therapy     LAST TREATMENT      Hypertension     Knee swelling     Limited joint range of motion (ROM)     RIGHT HIP    Low back strain     Periarthritis of shoulder     Rotator cuff syndrome 6/22    Skin sore     OPEN SORE LEFT BREAST    Syncope     Vertigo         Past Surgical History:   Procedure Laterality Date    AXILLARY LYMPH NODE BIOPSY/EXCISION Right     LYMPH NODE UNDER RIGHT ARM-MALIGNANT (DOUBLE MASTECTOMY)    BREAST BIOPSY Left     MALIGNANT    FRACTURE SURGERY  1987    Leg    HARDWARE REMOVAL      INCISION AND DRAINAGE LEG Left 06/30/2022    Procedure: INCISION AND DRAINAGE left ankle;  Surgeon: Kenneth Tran MD;  Location: [facility];  Service: Orthopedics;  Laterality: Left;    JOINT REPLACEMENT Bilateral mar 2020,july 2021    hips    MASTECTOMY W/ SENTINEL NODE BIOPSY Bilateral 09/16/2019    Procedure: BILATERLA MODIFIED RADICAL MASTECTOMY WITH BILATERAL SENTINEL LYMPH NODE BIOPSY;  Surgeon: Joby Barron Jr., MD;  Location: [facility];  Service: General    TOTAL HIP ARTHROPLASTY Right 03/02/2020    Procedure: RIGHT TOTAL HIP ARTHROPLASTY NATALY NAVIGATION;  Surgeon: Luis M Leonard MD;  Location: [facility];  Service: Orthopedics;  Laterality: Right;    TOTAL HIP ARTHROPLASTY Left 07/20/2021    Procedure: Posterior LEFT TOTAL HIP ARTHROPLASTY NATALY NAVIGATION;  Surgeon: Luis M Leonard MD;  Location: [facility];  Service: Orthopedics;  Laterality: Left;    VENOUS ACCESS DEVICE (PORT) INSERTION Right 02/01/2019    Procedure: INSERTION VENOUS ACCESS DEVICE;  Surgeon:  "Joby Barron Jr., MD;  Location:  [facility];  Service: General    VENOUS ACCESS DEVICE (PORT) REMOVAL N/A 10/30/2019    Procedure: Mediport Removal;  Surgeon: Joby Barron Jr., MD;  Location: [facility] MAIN OR;  Service: General        Family History   Problem Relation Age of Onset    Lung cancer Father     Irritable bowel syndrome Father     Cancer Mother     Breast cancer Mother     Liver disease Mother     Breast cancer Sister 48    Breast cancer Maternal Grandmother     Breast cancer Paternal Grandmother     Breast cancer Maternal Uncle     Malig Hyperthermia Neg Hx         Social History     Socioeconomic History    Marital status:      Spouse name: Cruz    Number of children: 0   Tobacco Use    Smoking status: Never    Smokeless tobacco: Never   Vaping Use    Vaping status: Never Used   Substance and Sexual Activity    Alcohol use: Yes     Comment: occasional    Drug use: No    Sexual activity: Not Currently     Partners: Male     Birth control/protection: Abstinence        OB History          0    Para   0    Term   0       0    AB   0    Living   0         SAB   0    IAB   0    Ectopic   0    Molar   0    Multiple   0    Live Births   0                 Allergies   Allergen Reactions    Benadryl [Diphenhydramine] Itching     INCREASES BLOOD PRESSURE    Erythromycin GI Intolerance    Levaquin [Levofloxacin] GI Intolerance    Penicillins GI Intolerance      Review of Systems  Review of systems mentioned HPI otherwise negative    Objective   Blood pressure 119/81, temperature 97.9 °F (36.6 °C), temperature source Oral, height 165.1 cm (65\"), weight 79.7 kg (175 lb 9.6 oz), SpO2 99%, not currently breastfeeding.   Limited due to video visit  Physical Exam  Vitals reviewed.   Constitutional:       Appearance: Normal appearance. She is normal weight.   HENT:      Nose: Nose normal.   Eyes:      Conjunctiva/sclera: Conjunctivae normal.   Cardiovascular:      Rate and Rhythm: Normal rate. "   Pulmonary:      Effort: Pulmonary effort is normal.   Musculoskeletal:      Cervical back: Normal range of motion.   Skin:     General: Skin is warm.   Neurological:      General: No focal deficit present.      Mental Status: She is alert.   Psychiatric:         Mood and Affect: Mood normal.         Behavior: Behavior normal.         Thought Content: Thought content normal.         Judgment: Judgment normal.       I have reexamined the patient and the results are consistent with the previously documented exam. Kayley Miguel MD      Lab on 02/14/2025   Component Date Value Ref Range Status    Glucose 02/14/2025 109 (H)  65 - 99 mg/dL Final    BUN 02/14/2025 16  8 - 23 mg/dL Final    Creatinine 02/14/2025 0.82  0.57 - 1.00 mg/dL Final    Sodium 02/14/2025 141  136 - 145 mmol/L Final    Potassium 02/14/2025 4.0  3.5 - 5.2 mmol/L Final    Chloride 02/14/2025 104  98 - 107 mmol/L Final    CO2 02/14/2025 25.7  22.0 - 29.0 mmol/L Final    Calcium 02/14/2025 10.0  8.6 - 10.5 mg/dL Final    Total Protein 02/14/2025 7.0  6.0 - 8.5 g/dL Final    Albumin 02/14/2025 4.5  3.5 - 5.2 g/dL Final    ALT (SGPT) 02/14/2025 22  1 - 33 U/L Final    AST (SGOT) 02/14/2025 27  1 - 32 U/L Final    Alkaline Phosphatase 02/14/2025 98  39 - 117 U/L Final    Total Bilirubin 02/14/2025 0.5  0.0 - 1.2 mg/dL Final    Globulin 02/14/2025 2.5  gm/dL Final    A/G Ratio 02/14/2025 1.8  g/dL Final    BUN/Creatinine Ratio 02/14/2025 19.5  7.0 - 25.0 Final    Anion Gap 02/14/2025 11.3  5.0 - 15.0 mmol/L Final    eGFR 02/14/2025 78.5  >60.0 mL/min/1.73 Final    WBC 02/14/2025 3.65  3.40 - 10.80 10*3/mm3 Final    RBC 02/14/2025 4.71  3.77 - 5.28 10*6/mm3 Final    Hemoglobin 02/14/2025 14.2  12.0 - 15.9 g/dL Final    Hematocrit 02/14/2025 43.9  34.0 - 46.6 % Final    MCV 02/14/2025 93.2  79.0 - 97.0 fL Final    MCH 02/14/2025 30.1  26.6 - 33.0 pg Final    MCHC 02/14/2025 32.3  31.5 - 35.7 g/dL Final    RDW 02/14/2025 14.2  12.3 - 15.4 % Final    RDW-SD  02/14/2025 48.6  37.0 - 54.0 fl Final    MPV 02/14/2025 8.0  6.0 - 12.0 fL Final    Platelets 02/14/2025 152  140 - 450 10*3/mm3 Final    Neutrophil % 02/14/2025 62.2  42.7 - 76.0 % Final    Lymphocyte % 02/14/2025 25.5  19.6 - 45.3 % Final    Monocyte % 02/14/2025 6.3  5.0 - 12.0 % Final    Eosinophil % 02/14/2025 5.2  0.3 - 6.2 % Final    Basophil % 02/14/2025 0.8  0.0 - 1.5 % Final    Immature Grans % 02/14/2025 0.0  0.0 - 0.5 % Final    Neutrophils, Absolute 02/14/2025 2.27  1.70 - 7.00 10*3/mm3 Final    Lymphocytes, Absolute 02/14/2025 0.93  0.70 - 3.10 10*3/mm3 Final    Monocytes, Absolute 02/14/2025 0.23  0.10 - 0.90 10*3/mm3 Final    Eosinophils, Absolute 02/14/2025 0.19  0.00 - 0.40 10*3/mm3 Final    Basophils, Absolute 02/14/2025 0.03  0.00 - 0.20 10*3/mm3 Final    Immature Grans, Absolute 02/14/2025 0.00  0.00 - 0.05 10*3/mm3 Final    nRBC 02/14/2025 0.0  0.0 - 0.2 /100 WBC Final   Lab on 01/17/2025   Component Date Value Ref Range Status    Glucose 01/17/2025 91  65 - 99 mg/dL Final    BUN 01/17/2025 12  8 - 23 mg/dL Final    Creatinine 01/17/2025 0.84  0.57 - 1.00 mg/dL Final    Sodium 01/17/2025 144  136 - 145 mmol/L Final    Potassium 01/17/2025 4.2  3.5 - 5.2 mmol/L Final    Chloride 01/17/2025 108 (H)  98 - 107 mmol/L Final    CO2 01/17/2025 24.9  22.0 - 29.0 mmol/L Final    Calcium 01/17/2025 9.4  8.6 - 10.5 mg/dL Final    Total Protein 01/17/2025 6.3  6.0 - 8.5 g/dL Final    Albumin 01/17/2025 4.0  3.5 - 5.2 g/dL Final    ALT (SGPT) 01/17/2025 15  1 - 33 U/L Final    AST (SGOT) 01/17/2025 25  1 - 32 U/L Final    Alkaline Phosphatase 01/17/2025 93  39 - 117 U/L Final    Total Bilirubin 01/17/2025 0.4  0.0 - 1.2 mg/dL Final    Globulin 01/17/2025 2.3  gm/dL Final    A/G Ratio 01/17/2025 1.7  g/dL Final    BUN/Creatinine Ratio 01/17/2025 14.3  7.0 - 25.0 Final    Anion Gap 01/17/2025 11.1  5.0 - 15.0 mmol/L Final    eGFR 01/17/2025 76.3  >60.0 mL/min/1.73 Final    WBC 01/17/2025 3.91  3.40 - 10.80  10*3/mm3 Final    RBC 01/17/2025 4.49  3.77 - 5.28 10*6/mm3 Final    Hemoglobin 01/17/2025 13.4  12.0 - 15.9 g/dL Final    Hematocrit 01/17/2025 42.0  34.0 - 46.6 % Final    MCV 01/17/2025 93.5  79.0 - 97.0 fL Final    MCH 01/17/2025 29.8  26.6 - 33.0 pg Final    MCHC 01/17/2025 31.9  31.5 - 35.7 g/dL Final    RDW 01/17/2025 14.6  12.3 - 15.4 % Final    RDW-SD 01/17/2025 50.0  37.0 - 54.0 fl Final    MPV 01/17/2025 8.2  6.0 - 12.0 fL Final    Platelets 01/17/2025 149  140 - 450 10*3/mm3 Final    Neutrophil % 01/17/2025 58.3  42.7 - 76.0 % Final    Lymphocyte % 01/17/2025 27.1  19.6 - 45.3 % Final    Monocyte % 01/17/2025 9.2  5.0 - 12.0 % Final    Eosinophil % 01/17/2025 4.1  0.3 - 6.2 % Final    Basophil % 01/17/2025 1.0  0.0 - 1.5 % Final    Immature Grans % 01/17/2025 0.3  0.0 - 0.5 % Final    Neutrophils, Absolute 01/17/2025 2.28  1.70 - 7.00 10*3/mm3 Final    Lymphocytes, Absolute 01/17/2025 1.06  0.70 - 3.10 10*3/mm3 Final    Monocytes, Absolute 01/17/2025 0.36  0.10 - 0.90 10*3/mm3 Final    Eosinophils, Absolute 01/17/2025 0.16  0.00 - 0.40 10*3/mm3 Final    Basophils, Absolute 01/17/2025 0.04  0.00 - 0.20 10*3/mm3 Final    Immature Grans, Absolute 01/17/2025 0.01  0.00 - 0.05 10*3/mm3 Final    nRBC 01/17/2025 0.0  0.0 - 0.2 /100 WBC Final        CT Chest With Contrast Diagnostic    Result Date: 2/12/2025  1. New right internal mammary chain lymphadenopathy measuring up to about 2 cm. 2. There is some nodularity in the right epicardial fat pad that has increased with dominant nodule measuring 1.9 cm in greatest dimension previously about 7 mm 3. There are bilateral cystic masses or cysts again demonstrated. The larger left cystic lesion is stable and the smaller right cystic lesion is slightly. Measurements above    Radiation dose reduction techniques were utilized, including automated exposure control and exposure modulation based on body size.   This report was finalized on 2/12/2025 12:04 PM by Dr. Perry  ASHLEY Morris M.D on Workstation: [id number]      CT Abdomen Pelvis With Contrast    Result Date: 2/12/2025  1. New right internal mammary chain lymphadenopathy measuring up to about 2 cm. 2. There is some nodularity in the right epicardial fat pad that has increased with dominant nodule measuring 1.9 cm in greatest dimension previously about 7 mm 3. There are bilateral cystic masses or cysts again demonstrated. The larger left cystic lesion is stable and the smaller right cystic lesion is slightly. Measurements above    Radiation dose reduction techniques were utilized, including automated exposure control and exposure modulation based on body size.   This report was finalized on 2/12/2025 12:04 PM by Dr. Orlando Morris M.D on Workstation: [id number]      NM Bone Scan Whole Body    Result Date: 2/7/2025  No abnormal skeletal radiotracer uptake in a pattern of osseous malignant or metastatic disease.  This report was finalized on 2/7/2025 2:59 PM by Paulo Polk MD on Workstation: [id number]        CT of the chest abdomen and pelvis and bone scan images independently reviewed and interpreted by me in detail summarized above    Assessment & Plan       *Metastatic ER positive breast cancer  Initially presented with neglected breast in 2019 treated with neoadjuvant Adriamycin and Cytoxan followed by Taxol followed by bilateral mastectomies and extra lymph node dissection and adjuvant radiation.  Started on adjuvant Femara after completion of chemotherapy surgery and radiation in 2019  Metastatic recurrence of disease in May 2021 and hence Ribociclib initiated.  Subsequent acute hepatitis from Ribociclib and hence Ribociclib and Femara discontinued  Started Aromasin in March 2022  Metastatic disease recurrence with pericardial mass in January 2024 biopsy to be ER positive MS positive and HER2 0, [facility] demonstrates ESR 1 mutation  Started back on Ribociclib and continued Aromasin in January 2024  Significant elevation in  LFTs after initiation of Ribociclib resulting in discontinuation of all treatment in March 2024  She was also initiated on steroids to help with the hepatitis.  Palliative radiation to the mediastinal mass completed in June 2024  CT of the chest abdomen pelvis from June 2024 shows decrease in size of the mass.  Initiated Faslodex in July 2024  CT of the chest abdomen and pelvis and bone scan 10/21/2024 with stable disease except for new enhancing nodule at the right margin of the pericardial mass.  2/7/2025-CT of the chest abdomen pelvis with increase in size of internal mammary chain lymph node as well as increase in size of the epicardial nodule suggestive of disease progression.  Patient previously was on Ribociclib which had to be discontinued due to hepatitis and hence she was continued on endocrine therapy alone.  Given that she has never truly been on a CDK 4 6 inhibitor, we could consider continuing the Faslodex along with addition of palbociclib.  Start palbociclib at 100 mg 3 out of 4 weeks  Adverse effects including but not limited to myelosuppression, nausea, vomiting discussed.  MTM to do chemotherapy education.    *Ribociclib induced hepatitis  Patient has developed increase in LFTs on both occasions after use of Ribociclib.  This has been permanently discontinued  Patient has also been started on prednisone after the most recent acute hepatitis from Ribociclib  Currently she is on a 5 mg dose of prednisone  Recommend that she decrease this to 2.5 mg for the next week and subsequently discontinue.  LFTs remain normal today  Start palbociclib  Monitor LFTs closely while on palbociclib    *Ovarian cyst  Gradual increase in size of the ovarian cyst on the left.  The left ovarian cyst continues to increase in size  Continue to monitor for now     *Atrial fibrillation  Continue follow-up with Dr. Danni Odell with cardiology  Heart rate normal at stable  Continues on metoprolol.  Continue Eliquis    *Lymphedema  of left upper extremity  Continue follow-up with lymphedema clinic    *Hypertension  Blood pressure well-controlled at BP: 119/81     *Vertigo  Intermittent  Somewhat worse more recently  She is currently not using any medications for the same  Could benefit from physical therapy.    *Headaches  Mild and tolerable  Continue Tylenol as needed    PLAN   Continue every 4 weekly Faslodex  Tumor markers are not really suggestive of disease progression however not reliable at this point.  CT chest abdomen and pelvis and bone scan from 2/7/2025 with disease progression, addition of palbociclib to Faslodex planned.  APRN in 3 weeks, MD in 5 weeks.  CBC and CMP at each visit    Patient is on medications requiring close monitoring for toxicities.    Progression of metastatic breast cancer requiring treatment changed.  50 minutes total spent on the encounter on the same day

## 2025-02-15 LAB — CANCER AG27-29 SERPL-ACNC: 22.3 U/ML (ref 0–38.6)

## 2025-02-17 ENCOUNTER — SPECIALTY PHARMACY (OUTPATIENT)
Dept: PHARMACY | Facility: HOSPITAL | Age: 65
End: 2025-02-17
Payer: MEDICARE

## 2025-02-17 NOTE — PROGRESS NOTES
Ibrance approved from 1/1/2025-12/31/2025 through [employer].    Test claim ran and it returned a copay of $2,000 for her. At this time, the foundations are closed for funding. I have added her to the [facility] waitlist.    I will seek a voucher for the first fill.    Estelle Emmanuel, Pharmacy Technician  02/17/25  08:35 EST

## 2025-02-17 NOTE — PROGRESS NOTES
Benefits Investigation Summary    Prescription: New Therapy-Ibrance    Dispensing pharmacy: [facility]-for first fill with Voucher    Copay amount: $0 copay with Voucher ($2,000 with insurance)    PLAN: [employer] Part D  BIN: [id number]  PCN: [id number]  RX GROUP: [id number]    Prior Auth and Med Assistance notes: Ibrance approved from 1/1/2025-12/31/2025 through [employer] Part D.    Test claim ran and it returned a copay of $2,000 for her. At this time, the [employer] are closed for funding. I have added her to the [facility] waitlist.    I will seek a voucher for the first fill.    Estelle Emmanuel, Pharmacy Technician  02/17/25  08:39 EST

## 2025-02-25 ENCOUNTER — SPECIALTY PHARMACY (OUTPATIENT)
Dept: ONCOLOGY | Facility: HOSPITAL | Age: 65
End: 2025-02-25
Payer: MEDICARE

## 2025-02-25 ENCOUNTER — OFFICE VISIT (OUTPATIENT)
Dept: ONCOLOGY | Facility: CLINIC | Age: 65
End: 2025-02-25
Payer: MEDICARE

## 2025-02-25 VITALS
TEMPERATURE: 97.5 F | BODY MASS INDEX: 29.04 KG/M2 | SYSTOLIC BLOOD PRESSURE: 114 MMHG | DIASTOLIC BLOOD PRESSURE: 80 MMHG | HEIGHT: 65 IN | OXYGEN SATURATION: 98 % | WEIGHT: 174.3 LBS | HEART RATE: 77 BPM

## 2025-02-25 DIAGNOSIS — C50.812 MALIGNANT NEOPLASM OF OVERLAPPING SITES OF BOTH BREASTS IN FEMALE, ESTROGEN RECEPTOR POSITIVE: Primary | ICD-10-CM

## 2025-02-25 DIAGNOSIS — Z17.0 MALIGNANT NEOPLASM OF OVERLAPPING SITES OF BOTH BREASTS IN FEMALE, ESTROGEN RECEPTOR POSITIVE: Primary | ICD-10-CM

## 2025-02-25 DIAGNOSIS — C50.811 MALIGNANT NEOPLASM OF OVERLAPPING SITES OF BOTH BREASTS IN FEMALE, ESTROGEN RECEPTOR POSITIVE: Primary | ICD-10-CM

## 2025-02-25 DIAGNOSIS — Z17.0 MALIGNANT NEOPLASM OF OVERLAPPING SITES OF LEFT BREAST IN FEMALE, ESTROGEN RECEPTOR POSITIVE: ICD-10-CM

## 2025-02-25 DIAGNOSIS — C50.812 MALIGNANT NEOPLASM OF OVERLAPPING SITES OF LEFT BREAST IN FEMALE, ESTROGEN RECEPTOR POSITIVE: ICD-10-CM

## 2025-02-25 RX ORDER — ONDANSETRON 8 MG/1
8 TABLET, ORALLY DISINTEGRATING ORAL 3 TIMES DAILY PRN
Qty: 30 TABLET | Refills: 5 | Status: SHIPPED | OUTPATIENT
Start: 2025-02-25

## 2025-02-25 NOTE — PROGRESS NOTES
TREATMENT  PREPARATION    Marylu Georges  [id number]  1960    Chief Complaint: Treatment preparation and needs assessment    History of present illness:  Marylu Georges is a 65 y.o. year old female who is here today for treatment preparation and needs assessment.  The patient has been diagnosed with   Encounter Diagnosis   Name Primary?    Malignant neoplasm of overlapping sites of both breasts in female, estrogen receptor positive Yes    and is scheduled to begin treatment with:     Oncology History:    Oncology/Hematology History   Malignant neoplasm of overlapping sites of left breast in female, estrogen receptor positive   1/22/2019 Initial Diagnosis    Malignant neoplasm of overlapping sites of left breast in female, estrogen receptor positive (CMS/HCC)     6/14/2021 - 6/14/2021 Chemotherapy    OP BREAST Letrozole / Ribociclib     7/5/2024 -  Chemotherapy    OP BREAST Fulvestrant     2/14/2025 Biopsy    OP BREAST Palbociclib  Plan Provider: Kayley Miguel MD  Treatment goal: Control  Line of treatment: Second Line     Malignant neoplasm of overlapping sites of both breasts in female, estrogen receptor positive   11/10/2019 Initial Diagnosis    Malignant neoplasm of overlapping sites of both breasts in female, estrogen receptor positive     2/14/2025 Biopsy    OP BREAST Palbociclib  Plan Provider: Kayley Miguel MD  Treatment goal: Control  Line of treatment: Second Line         The current medication list and allergy list were reviewed and reconciled.     Past Medical History, Past Surgical History, Social History, Family History have been reviewed and are without significant changes except as mentioned.    Physical Exam:    Vitals:    02/25/25 1432   BP: 114/80   Pulse: 77   Temp: 97.5 °F (36.4 °C)   SpO2: 98%     Vitals:    02/25/25 1432   PainSc: 0-No pain        ECOG score: 0             Physical Exam  HENT:      Head: Normocephalic and atraumatic.   Eyes:      Extraocular Movements:  Extraocular movements intact.      Conjunctiva/sclera: Conjunctivae normal.   Pulmonary:      Effort: Pulmonary effort is normal. No respiratory distress.   Neurological:      General: No focal deficit present.      Mental Status: She is alert and oriented to person, place, and time.   Psychiatric:         Mood and Affect: Mood normal.         Behavior: Behavior normal.           NEEDS ASSESSMENTS    Genetics  The patient's new diagnosis and family history have been reviewed for genetic counseling needs. The patient will not be referred..     Psychosocial and Barriers to care  The patient has completed a PHQ-9 Depression Screening and the Distress Thermometer (DT) today.  PHQ-9 results show PHQ-2 Total Score: 0 PHQ-9 Total Score: 0      The patient scored their distress today as Distress Level: 0-No distress on a scale of 0-10 with 0 being no distress and 10 being extreme distress. Problems marked by the patient as being an issue for them within the last week include   .      Results were reviewed along with psychosocial resources offered by our cancer center.  Our Supportive Oncology team will be flagged for a score of 4 or above, and a same day call will be made for a score of 9 or 10.  A mental health referral is offered at that time. Patients who score less than 4 have been educated on our support services and can be referred to our  upon request.  The patient will not be referred to our .     Nutrition  The patient has completed the malnutrition screening today. They scored Malnutrition Screening Tool  Have you recently lost weight without trying?  If yes, how much weight have you lost?: 0--> No  Have you been eating poorly because of a decreased appetite?: 0--> No  MST score: 0   with a score of 0-1 meaning not at risk in a score of 2 or greater meaning at risk.  Patients with a score of 3 or higher will be referred to our oncology dietitian for support. Patients beginning at risk  "treatment regimens or who have dietary concerns will also be referred to our oncology dietitian. The patient will not be referred.    Intravenous Access Assessment  The patient and I discussed planned intravenous chemo/biotherapy as well as other IV treatments that are often needed throughout the course of treatment. These may include, but are not limited to blood transfusions, antibiotics, and IV hydration. Discussed that depending on selected treatment and vein assessment, patient may require venous access device (VAD) which could include but not limited to a Mediport or PICC line. Risks and benefits of VADs reviewed. The patient will be treated via Oral Treatment.    Reproductive/Sexual Activity   People should avoid becoming pregnant and should not get a partner pregnant while undergoing chemo/biotherapy.  People of childbearing age should use effective contraception during active therapy. The best recommendation for all people is to use a barrier method for a minimum of 1 week after the last infusion of chemo/biotherapy to prevent your partner being exposed to byproducts from treatment medications in bodily fluids. Effective contraception should be discussed with your oncology team to make sure it is safe to take based on your diagnosis. Possible options include oral contraceptives, barrier methods. Chemo/biotherapy can change your ability to reproduce children in the future.  There are options for fertility preservation. NOT APPLICABLE    Advanced Care Planning  Advance Care Planning   The patient and I discussed advanced care planning, \"Conversations that Matter\".   This service is offered for development of advance directives with a certified ACP facilitator.  The patient does have an up-to-date advanced directive. This document is on file with our office. The patient is not interested in an appointment with one of our facilitators to create or update their advanced directives.    Have you reviewed your " Advance Directive and is it valid for this stay?: Yes  Patient Requests Assistance on Advance Directives: Patient Declined          Smoking cessation  Tobacco Use: Low Risk  (2/25/2025)    Patient History     Smoking Tobacco Use: Never     Smokeless Tobacco Use: Never     Passive Exposure: Not on file       Patient and I discussed their tobacco use history. Referral will not be made for smoking cessation.      Palliative Care  When appropriate, the patient and I discussed the availability palliative care services and when appropriate Hospice care. Palliative care is not the same as Hospice care which was explained to the patient.The patient is not interested in additional information from our  on these services.    Survivorship   When appropriate, we discussed that we will refer the patient to survivorship clinic to discuss next steps following completion of planned treatment.  Reviewed this visit will include assessment of your physical, psychological, functional, and spiritual needs as a survivor and the need at attend this visit when scheduled.    TREATMENT EDUCATION    Today I met with the patient to discuss the chemo/biotherapy regimen recommended for treatment of Malignant neoplasm of overlapping sites of both breasts in female, estrogen receptor positive  .  The patient was given explanation of treatment premed side effects including office policy that prohibits patients to drive if sedating medications are administered, MD explanation given regarding benefits, side effects, toxicities and goals of treatment.  The patient received a Chemotherapy/Biotherapy Plan Summary including diagnosis and explanation of specific treatment plan.    SIDE EFFECTS:  Common side effects were discussed with the patient and/or significant other.  Discussion included where applicable hair loss/discoloration, anemia/fatigue, infection/chills/fever, appetite, bleeding risk/precautions, constipation, diarrhea, mouth  sores, taste alteration, loss of appetite, nausea/vomiting, peripheral neuropathy, skin/nail changes, rash, muscle aches/weakness, photosensitivity, weight gain/loss, hearing loss, dizziness, menopausal symptoms, menstrual irregularity, sterility, high blood pressure, heart damage, liver damage, lung damage, kidney damage, DVT/PE risk, fluid retention, pleural/pericardial effusion, somnolence, electrolyte/LFT imbalance, vein exercises and/or the possible need for vascular access/port placement.  The patient was advised that although uncommon, leakage of an infused medication from the vein or venous access device may lead to skin breakdown and/or other tissue damage.  The patient was advised that he/she may have pain, bleeding, and/or bruising from the insertion of a needle in their vein or venous access device (port).  The patient was further advised that, in spite of proper technique, infection with redness and irritation may rarely occur at the site where the needle was inserted.  The patient was advised that if complications occur, additional medical treatment is available.  Finally, where applicable we have reviewed rare but potential immune mediated side effects including shortness of breath, cough, chest pain (pneumonitis), abdominal pain, diarrhea (colitis), thyroiditis (hypothyroid or hyperthyroid), hepatitis and liver dysfunction, nephritis and renal dysfunction.    Discussion also included side effects specific to drugs in the treatment plan, specifically:    Treatment Plans       Name Type Hold Status Plan Dates Plan Provider       Active    OP SUPPORTIVE HYDRATION + ANTIEMETICS ONCOLOGY SUPPORTIVE CARE 1 On Automatic Hold 6/4/2024 - Present Kayley Miguel MD    OP BREAST Fulvestrant ONCOLOGY TREATMENT Not on Hold 7/1/2024 - Present Kayley Miguel MD    OP BREAST Palbociclib ONCOLOGY TREATMENT 2 Not on Hold 2/13/2025 - Present Kayley Miguel MD                     Questions answered and additional  information discussed on topics including:  Anemia, Thrombocytopenia, Neutropenia, Nutrition and appetite changes, Constipation, Diarrhea, Nausea & vomiting, Mouth sores, Skin & nail changes, Organ toxicities, Home care, and Oral medication handling and disposal       Assessment and Plan:    Diagnoses and all orders for this visit:    1. Malignant neoplasm of overlapping sites of both breasts in female, estrogen receptor positive (Primary)      No orders of the defined types were placed in this encounter.        The patient and I have reviewed their diagnosis and scheduled treatment plan. Needs assessment was completed where applicable including genetics, psychosocial needs, barriers to care, VAD evaluation, advanced care planning, survivorship, and palliative care services where indicated. Referrals have been ordered as appropriate based upon evaluation today and patient desires.   Chemo/biotherapy teaching was completed today and consent obtained. See separate documentation for further details.  Adequate time was given to answer questions.  Patient made aware of their care team members and contact information if they have questions or problems throughout the treatment course.  Discussion held and written information provided describing frequency of office visits and ongoing monitoring throughout the treatment plan.     Reviewed with patient any prescribed medication sent to pharmacy.  Education provided regarding proper storage, safe handling, and proper disposal of unused medication.  Proper handling of body fluids and waste discussed and written information provided.  If appropriate, patient had pretreatment labs drawn today.    Learning assessment completed at initial patient encounter. See separate flowsheet. Chemo/biotherapy education comprehension assessed at today's visit.    I spent 15 minutes caring for Marylu on this date of service. This time includes time spent by me in the following activities:  preparing for the visit, reviewing tests, obtaining and/or reviewing a separately obtained history, documenting information in the medical record, and care coordination.     Anjali Herring, APRN   02/25/25

## 2025-02-25 NOTE — PROGRESS NOTES
Specialty Pharmacy Patient Management Program  Oncology Initial Assessment     Marylu Georges was referred by their provider to the Oncology Patient Management program offered by [facility] for metastatic breast cancer on 02/25/25.  An initial outreach was conducted, including assessment of therapy appropriateness and specialty medication education for Ibrance. The patient was introduced to services offered by [facility], including: regular assessments, refill coordination, curbside pick-up or mail order delivery options, prior authorization maintenance, and financial assistance programs as applicable. The patient was also provided with contact information for the pharmacy team.     Goal of chemotherapy: disease control    Treatment Medication(s) / Frequency and Dosing    Ibrance 100 mg po daily 21/28 days  Continue Faslodex 500 mg IM every 28 days    Number of cycles: until disease progression or unacceptable toxicity    Start date of oral specialty medication: As soon as oral specialty medication is available.    Follow-up Testing to be determined after TBD cycles by MD.     Items for home use: Imodium AD (for diarrhea)    Rx written for: [] Nausea    [] Pre-Chemo   ondansetron 8 mg by mouth every 8 hours as needed for nausea    Completing Pharmacist: Gina Lopez RPH             Date/time: 02/25/2025 15:00 EST     Insurance Coverage & Financial Support  Voucher for first fill. Estelle W to follow.     Relevant Past Medical History and Comorbidities  Relevant medical history and concomitant health conditions were discussed with the patient. The patient's chart has been reviewed for relevant past medical history and comorbid conditions and updated as necessary.  Past Medical History:   Diagnosis Date    Anemia in neoplastic disease     Arthritis     Arthritis of back     Arthritis of neck     Breast cancer     Left    CVA (cerebral vascular accident)     Encounter  for long-term (current) use of other medications 06/22/2021    Fracture, fibula     Fracture, finger     Frozen shoulder     Hip arthrosis 01/17    Hip pain     RIGHT HIP... CYST    History of fracture of leg 1987    History of radiation therapy     LAST TREATMENT      Hypertension     Knee swelling     Limited joint range of motion (ROM)     RIGHT HIP    Low back strain     Periarthritis of shoulder     Rotator cuff syndrome 6/22    Skin sore     OPEN SORE LEFT BREAST    Syncope     Vertigo      Social History     Socioeconomic History    Marital status:      Spouse name: Cruz    Number of children: 0   Tobacco Use    Smoking status: Never    Smokeless tobacco: Never   Vaping Use    Vaping status: Never Used   Substance and Sexual Activity    Alcohol use: Yes     Comment: occasional    Drug use: No    Sexual activity: Not Currently     Partners: Male     Birth control/protection: Abstinence       Problem list reviewed by Gina Lopez RPH on 2/25/2025 at  2:58 PM    Allergies  Known allergies and reactions were discussed with the patient. The patient's chart has been reviewed for  allergy information and updated as necessary.   Allergies   Allergen Reactions    Benadryl [Diphenhydramine] Itching     INCREASES BLOOD PRESSURE    Erythromycin GI Intolerance    Levaquin [Levofloxacin] GI Intolerance    Penicillins GI Intolerance       Allergies reviewed by Gina Lopez RPH on 2/25/2025 at  2:58 PM    Relevant Laboratory Values  Relevant laboratory values were discussed with the patient. The following specialty medication dose adjustment(s) are recommended: none  Lab Results   Component Value Date    GLUCOSE 109 (H) 02/14/2025    CALCIUM 10.0 02/14/2025     02/14/2025    K 4.0 02/14/2025    CO2 25.7 02/14/2025     02/14/2025    BUN 16 02/14/2025    CREATININE 0.82 02/14/2025    EGFRIFNONA 63 01/25/2022    BCR 19.5 02/14/2025    ANIONGAP 11.3 02/14/2025     Lab Results   Component Value  Date    WBC 3.65 02/14/2025    RBC 4.71 02/14/2025    HGB 14.2 02/14/2025    HCT 43.9 02/14/2025    MCV 93.2 02/14/2025    MCH 30.1 02/14/2025    MCHC 32.3 02/14/2025    RDW 14.2 02/14/2025    RDWSD 48.6 02/14/2025    MPV 8.0 02/14/2025     02/14/2025    NEUTRORELPCT 62.2 02/14/2025    LYMPHORELPCT 25.5 02/14/2025    MONORELPCT 6.3 02/14/2025    EOSRELPCT 5.2 02/14/2025    BASORELPCT 0.8 02/14/2025    AUTOIGPER 0.0 02/14/2025    NEUTROABS 2.27 02/14/2025    LYMPHSABS 0.93 02/14/2025    MONOSABS 0.23 02/14/2025    EOSABS 0.19 02/14/2025    BASOSABS 0.03 02/14/2025    AUTOIGNUM 0.00 02/14/2025    NRBC 0.0 02/14/2025       Current Medication List  This medication list has been reviewed with the patient and evaluated for any interactions or necessary modifications/recommendations, and updated to include all prescription medications, OTC medications, and supplements the patient is currently taking.  This list reflects what is contained in the patient's profile, which has also been marked as reviewed to communicate to other providers it is the most up to date version of the patient's current medication therapy.     Current Outpatient Medications:     apixaban (Eliquis) 5 MG tablet tablet, TAKE ONE TABLET BY MOUTH EVERY 12 HOURS, Disp: 60 tablet, Rfl: 5    metoprolol succinate XL (TOPROL-XL) 50 MG 24 hr tablet, TAKE 2 TABLETS BY MOUTH EVERY MORNING AND TAKE ONE TABLET BY MOUTH EVERY EVENING, Disp: 270 tablet, Rfl: 0    ondansetron ODT (ZOFRAN-ODT) 8 MG disintegrating tablet, Take 1 tablet by mouth 3 (Three) Times a Day As Needed for Nausea or Vomiting., Disp: 30 tablet, Rfl: 5    Palbociclib 100 MG tablet tablet, Take 1 tablet by mouth Daily for 21 days. Take with or without food on days 1-21, then off for 7 days., Disp: 21 tablet, Rfl: 0    prochlorperazine (COMPAZINE) 10 MG tablet, Take 1 tablet by mouth Every 6 (Six) Hours As Needed for Nausea or Vomiting., Disp: 10 tablet, Rfl: 0  No current  facility-administered medications for this visit.    Facility-Administered Medications Ordered in Other Visits:     Chlorhexidine Gluconate Cloth 2 % pads 1 each, 1 each, Apply externally, Take As Directed, Luis M Leonard MD    Medicines reviewed by Gina Lopez [facility] on 2/25/2025 at  2:58 PM    Drug Interactions  Reviewed concomitant medications, allergies, labs, comorbidities/medical history, quality of life, and immunization history.   Drug-drug interactions noted and discussed during education: no significant drug interactions noted. . Reminded the patient to let us know before making any changes or starting any new prescription or OTC medications so we can first assess drug interactions.  Drug-food interactions noted and discussed during education: Patient was instructed to avoid eating grapefruit and drinking grapefruit juice    Initial Education Provided for Specialty Medication  The patient has been provided with the following education and any applicable administration techniques (i.e. self-injection) have been demonstrated for the therapies indicated. All questions and concerns have been addressed prior to the patient receiving the medication, and the patient has verbalized comprehension of the education and any materials provided. Additional patient education shall be provided and documented upon request by the patient, provider, or payer.    Provided patient with:   Education sheets about the medication, 24-hour clinic phone number and my contact information and instructions to call should additional questions arise.     Medication Education Sheets Provided:   Oral Specialty Medication: Ibrance (palbociclib)    TOPICS COMMENTS   Storage and Handling of Oral Specialty Medication Store in the original container, in a dry location out of direct sunlight, and out of reach of children or pets. Store at room temperature.  Discussed safe handling and what to do with any unused medication.   Administration of  Oral Specialty Medication Take with or without food at the same time(s) each day.   Adherence to Oral Specialty Regimen and Handling Missed Doses Patient is likely to have good treatment adherence; reinforced the importance of adherence. Reviewed how to address missed doses and to let us know of any missed doses.   Anemia: role of RBC, cause, s/s, ways to manage, role of transfusion Reviewed the role of RBC and the use of transfusions if hemoglobin decreases too much.  Patient to notify us if they experience shortness of breath, dizziness, or palpitations.  Also let patient know they could feel more tired than usual and to try to stay active, but rest if they need to.    Thrombocytopenia: role of platelet, cause, s/s, ways to prevent bleeding, things to avoid, when to seek help Reviewed the role of platelets in blood clotting and when to call clinic (bloody nose that bleeds for 5 mins despite pressure, a cut that won't stop bleeding despite pressure, gums that bleed excessively with brushing or flossing). Recommended using an electric razor, soft bristle toothbrush, and blowing your nose gently.    Neutropenia: role of WBC, cause, infection precautions, s/s of infection, when to call MD Reviewed the role of WBC, good infection prevention practices, and when to call the clinic (temperature 100.4F, sore throat, burning urination, etc)   Nutrition and Appetite Changes:  importance of maintaining healthy diet & weight, ways to manage to improve intake, dietary consult, exercise regimen, electrolyte and/or blood glucose abnormalities Decreased Appetite: Discussed the risk of decreased appetite. Recommended eating smaller, more frequent meals. Instructed the patient to contact the clinic if losing weight or having difficulty eating enough to maintain energy level.   Diarrhea: causes, s/s of dehydration, ways to manage, dietary changes, when to call MD Discussed the risk of diarrhea. Instructed patient on use OTC  loperamide with diarrhea onset, but emphasized the importance of calling the clinic if 4-6 episodes in 24 hours not relieved by OTC loperamide.   Constipation: causes, ways to manage, dietary changes, when to call MD Provided supplementary handout with instructions for use of docusate and other OTC therapies to manage constipation.  Instructed to call us if medications aren't working.   Nausea/Vomiting: cause, use of antiemetics, dietary changes, when to call MD Emetic risk: Low-Minimal  PRN home meds: Ondansetron 8mg PO every 8 hours PRN nausea / vomiting  Pharmacy home meds sent to: [facility]    Instructed the patient to take a dose of the PRN medication at the first onset of nausea and if it's not working to call us for additional medications.  Also provided non-drug measures to mitigate nausea.   Mouth Sores: causes, oral care, ways to manage Mouth sores can be prevented by making a mouth wash mixture of salt, baking soda, and water. The patient was instructed to swish and spit four times daily after meals and before bedtime.  Use of a soft bristle toothbrush was recommended.  The patient was instructed to avoid alcohol-containing OTC mouthwashes.    Alopecia: cause, ways to manage, resources Discussed the possibility of hair loss with the patient. Informed patient that they could request a prescription for a wig if desired and most of the cost is usually covered by insurance. Recommended covering the head with a hat and/or protecting the skin on the head with SPF 30 or higher.            Skin/Nail Changes: cause, s/s, ways to manage Call the office if you notice a skin rash       Organ Toxicities: cause, s/s, need for diagnostic tests, labs, when to notify MD Discussed potential effects on organ systems, monitoring, diagnostic tests, labs, and when to notify their MD. Discussed the signs/symptoms of the following: hepatotoxicity and lung changes       Miscellaneous Financial Issues: Estelle W to  follow  Lab Draws:  per MD discretion   Infertility and Sexuality:  causes, fertility preservation options, sexuality changes, ways to manage, importance of birth control The patient is not of childbearing potential.   Home Care: how to manage bodily fluids Counseled on management of soiled linens and proper flush technique.  Discussed how to manage all the side effects at home and advised when to contact the MD office           Adherence and Self-Administration  Adherence related to the patient's specialty therapy was discussed with the patient. The Adherence segment of this outreach has been reviewed and updated.     Is there a concern with patient's ability to self administer the medication correctly and without issue?: No  Were any potential barriers to adherence identified during the initial assessment or patient education?: No  Are there any concerns regarding the patient's understanding of the importance of medication adherence?: No  Methods for Supporting Patient Adherence and/or Self-Administration:  not needed  Expected duration of therapy: Until disease progression or intolerable toxicity    Open Medication Therapy Problems  No medication therapy recommendations to display    Goals of Therapy  Goals related to the patient's specialty therapy were discussed with the patient. The Patient Goals segment of this outreach has been reviewed and updated.   Goals Addressed Today        Specialty Pharmacy General Goal      Progression free survival based on scans  2/25/25 Education for Ibrance             Wrap up  Discussed aforementioned material with patient in person, face-to-face, in clinic.   Chemo consents/CCA were signed at today's visit.   Medication availability: patient will receive medication from [facility] pharmacy on: to be coordinated by Estelle POWER  Patient expressed understanding.   Patient demonstrates ability to self-administer medication. No barriers to adherence identified.  All questions and concerns  addressed.     Reassessment Plan & Follow-Up  1. Medication Therapy Changes: none  2. Related Plans, Therapy Recommendations, or Therapy Problems to Be Addressed: none  3. Pharmacist to perform regular assessments no more than (6) months from the previous assessment.  4. Care Coordinator to set up future refill outreaches, coordinate prescription delivery, and escalate clinical questions to pharmacist.  5. Welcome information and patient satisfaction survey to be sent by specialty pharmacy team with patient's initial fill.    Attestation  Therapeutic appropriateness: Appropriate   I attest the patient was actively involved in and has agreed to the above plan of care. If the prescribed therapy is at any point deemed not appropriate based on the current or future assessments, a consultation will be initiated with the patient's specialty care provider to determine the best course of action. The revised plan of therapy will be documented along with any required assessments and/or additional patient education provided.     Gina Lopez, PharmD, [facility]  Clinical Specialty Pharmacist, Oncology  2/25/2025  15:00 EST

## 2025-02-25 NOTE — PROGRESS NOTES
Specialty Pharmacy Patient Management Program  Per Protocol Prescription Order or Refill     Patient will be filling or currently fills medications at [facility] and is enrolled in the Patient Management Program.    Requested Prescriptions     Signed Prescriptions Disp Refills    Palbociclib 100 MG tablet tablet 21 tablet 0     Sig: Take 1 tablet by mouth Daily for 21 days. Take with or without food on days 1-21, then off for 7 days.    ondansetron ODT (ZOFRAN-ODT) 8 MG disintegrating tablet 30 tablet 5     Sig: Take 1 tablet by mouth 3 (Three) Times a Day As Needed for Nausea or Vomiting.     Prescription orders above were sent to the pharmacy per Collaborative Care Agreement Protocol.     Gina Lopez, PharmD, [facility]  Clinical Specialty Pharmacist, Oncology  2/25/2025  15:04 EST       Julian Berg  January 25, 2023    Pt is struggling with internal thoughts/ voices fixated on all the bad choices he has made, unable to see anything positive in the future.  Expressed thoughts about death and suicide plan to jump off bridge this morning.    Was discharged from inpt unit 2 days ago to mother's home, where he feels safe.  However woke up this morning overwhelmed, sad and frustrated, repeatedly making statements about wanting to die.  Pt is scared to be in the [facility] as nervous about hospitalization.  At this time he is voluntary.      Current Suicidal Ideation/Self-Injurious Concerns/Methods: Jumping (from building, into traffic, etc.)    Inappropriate Sexual Behavior: No    Aggression/Homicidal Ideation: None - N/A      For additional details see full DEC assessment.       Birgit Avila, LYNNSW

## 2025-02-25 NOTE — PROGRESS NOTES
Specialty Pharmacy Patient Management Program  Per Protocol Prescription Order or Refill       Requested Prescriptions     Signed Prescriptions Disp Refills    Palbociclib 100 MG tablet tablet 21 tablet 0     Sig: Take 1 tablet by mouth Daily for 21 days. Take with or without food on days 1-21, then off for 7 days.    ondansetron ODT (ZOFRAN-ODT) 8 MG disintegrating tablet 30 tablet 5     Sig: Take 1 tablet by mouth 3 (Three) Times a Day As Needed for Nausea or Vomiting.     Prescription orders above were sent to [facility] per Collaborative Care Agreement Protocol.     Completed independent double check on medication order/RX.    Kirill Merchant, PharmD, [facility]  Clinical Specialty Pharmacist, Oncology  2/25/2025  15:09 EST

## 2025-02-26 ENCOUNTER — SPECIALTY PHARMACY (OUTPATIENT)
Dept: PHARMACY | Facility: HOSPITAL | Age: 65
End: 2025-02-26
Payer: MEDICARE

## 2025-02-26 NOTE — PROGRESS NOTES
Specialty Pharmacy Patient Management Program  Hematology / Oncology Initial Fill Outreach      Marylu was contacted today regarding initial fill of her Ibrance specialty medication(s).    Specialty medication(s) and dose(s) confirmed: Yes  Ibrance 100 mg daily for 21 days on then 7 days off.    Delivery Questions      Flowsheet Row Most Recent Value   Delivery method [employer]   Delivery address verified with patient/caregiver? Yes  [Ship to home address]   Delivery address Home  [Ship to home addresss-Ship 2/27 for delivery 2/28-$0 copay with FREE month voucher-Address Confirmed]   Number of medications in delivery 1   Medication(s) being filled and delivered Palbociclib  [100 mg]   Doses left of specialty medications 0-New start   Copay verified? Yes   Copay amount $0 copay withIbrance Free month voucher   Copay form of payment No copayment ($0)   Delivery Date Selection 02/28/25   Signature Required No          Ibrance delivery coordinated with pt for 2/28/2025 to her home address. $0 copay with free month voucher. This is her first fill of the Ibrance. She had education with [facility] Pharmacist on 2/25/2025. No questions or concerns today. We discussed the copay moving forward and she will be prepared to pay this at her next fill.    Follow-Up: 21 Days    Estelle Emmanuel, Pharmacy Technician  2/26/2025  14:34 EST

## 2025-02-28 ENCOUNTER — SPECIALTY PHARMACY (OUTPATIENT)
Dept: PHARMACY | Facility: HOSPITAL | Age: 65
End: 2025-02-28
Payer: MEDICARE

## 2025-02-28 NOTE — PROGRESS NOTES
Ibrance supply from [facility] delivered to pt on 2/28/2025 at 9:15 am.    [employer] tracking # [id number]     Pt confirmed the delivery and she has taken her first dose.    Estelle Emmanuel, Pharmacy Technician  02/28/25  10:50 EST

## 2025-03-06 NOTE — PROGRESS NOTES
Subjective   Marylu Georges is a 65 y.o. female.  With metastatic ER positive breast cancer.    History of Present Illness     Patient is a postmenopausal 65-year-old  lady who initially presented with a neglected left breast with axillary lymphadenopathy which was measuring up to 9 cm.  The tumor was fixed at the time of presentation.  She was treated with neoadjuvant Adriamycin and Cytoxan followed by Taxol in 2019 and subsequently underwent adjuvant radiation to the left chest wall and axilla.  Placed on adjuvant Femara.      PET/CT on 5/19/2021 which showed 2.6 x 1 cm enlarged lymph node in the right costophrenic fat pad which was new.  SUV 6.8.  Findings consistent with localized metastatic disease.  No other foci of pathological hypermetabolic some identified in the chest.  7 x 4.8 cm unilocular cyst in the left adnexa most likely arises from the ovary.  Slight increase in size since previous scan when it measured 5.5 cm.  The cyst is photopenic supporting a benign etiology.    Patient was initiated on Ribociclib in May 2021 and Femara continued.  September 2021 it was noted that LFTs were elevated and Ribociclib and Femara were placed on hold.  CT chest performed in November 2021 showed resolution of the cardiophrenic angle lymph node.    Resumed Femara only in November 2021.    Patient was seen again 12/29/2021 and had persistent elevation of LFTs although not any worse compared to November 2021.  In fact LFTs had improved with an ALT of 326 and AST of 167.  Due to persistent elevation of the LFTs Femara was discontinued.    1/25/2022-improvement in LFTs with ALT of 90 and AST of 55.  Patient also had liver biopsy which was showing hepatitis.    3/7/2022-patient was initiated on Aromasin 25 mg daily.    In December 2023 patient complained of intermittent chest pain.  This was further evaluated with a CT angiogram of the chest performed on 12/29/2023.  There was an irregular heterogeneous anterior  pericardial mass which extends into the anterior chest wall measuring 6.5 cm transversely and on coronal series 6.5 x 5.2 cm.  There is thickening of the pericardium along the base of the mass and adjacent to the mass as well.  Appearance is suspicious for metastatic disease versus primary malignancy.  Enlarged nodes at both epicardial fat pads largest measuring 1.4 x 0.9 cm.  No evidence of pulmonary embolism.    1/23/2024-PET/CT with enlarging intensely avid anterior pericardial soft tissue mass representing patient's known malignancy.  Adjacent cardiophrenic soft tissue nodules and lymph nodes which are also larger when compared to September 2022 scan.  This raises concern for metastatic disease.  Scattered bilateral subcentimeter pulmonary nodules.  Focal uptake within the pubic symphysis with a focus of osteolysis and apparent mild widening increased from September 2022.  Metastatic disease would be atypical and underlying synovitis or infection cannot be excluded.    Adnexal cyst has increased in size since September 2022 and now measures 6.2 x 9.5 cm.    1/15/2024 biopsy of the mediastinal mass was consistent with metastatic breast cancer.  ER +81 to 90% strong  KY +2% weak  HER2 negative, score 0    [facility]us NGS with ESR 1 mutation.    Ribociclib was resumed in January 2024 and LFTs monitored closely.    In March 2024 LFTs started to spike up and subsequently Ribociclib as well as Aromasin was discontinued.     She completed radiation to the metastatic deposit in front of the pericardium in June 2024.    Initiated Faslodex in July 2024.    6/26/2024-CT of the chest abdomen and pelvis  Slight decrease in size of the anterior pericardial mass  Small right pelvic cyst appears stable when compared to 9/7/2022.  Larger left pelvic cyst measures 10.5 x 6.4 cm which has increased in size since September 2022.  Further evaluation is suggested.  If conservative management is elected then short-term follow-up with CT  pelvis recommended.  Status post bilateral mastectomies and bilateral axillary node dissection.      10/21/2024-CT of the chest abdomen and pelvis-decrease in size of the anterior pericardial mass measuring 5.5 x 1.2 cm, previously 5.6 x 1.6 cm.  New enhancing nodule along the right margin measuring 0.7 x 0.6 cm.  No mediastinal or hilar lymphadenopathy.  New bandlike opacity in the lateral aspect of the right apex no suspicious nodularity but focus reevaluation is recommended on subsequent CT chest with contrast.  Stable sub-6 mm left lower lobe pulmonary nodule.  10 x 6.2 cm left adnexal mass.  Stable 3.3 cm right ovarian cyst.    10/21/2024-no evidence of metastatic disease on the bone scan.    2/7/2025-CT of the chest abdomen and pelvis-new right internal mammary chain lymphadenopathy measuring 2 x 1 cm in greatest dimension.  No hilar lymphadenopathy.  Right epicardial fat pad along the pericardium is larger now measuring 1.9 x 1.4 cm, previously 7 mm.  No evidence of metastatic disease in the abdomen  Left adnexal cystic mass not significantly changed measuring 11 x 6.3 cm right adnexal cystic mass measuring 3.8 x 2.9 cm.  Previously 3.3 x 2.5 cm.    2/7/2025-bone scan without any evidence of metastatic disease.    Interval History  Marylu returns today for follow-up, specifically for toxicity check after initiating palbociclib following recent noted progression on CT imaging.  She is continuing monthly Faslodex as well, due again next week.  She has been on the palbociclib roughly 12 days in at this point.  As to be expected, she has mild leukopenia already with WBC of 2.46, ANC 1.5.  She denies any infectious symptoms.  She denies any GI upset and feels like overall she is tolerating new therapy well.      She continues with very dry, at times itchy, skin related to Faslodex.  She continues various creams to help with this.    She denies other concerns at this time.      The following portions of the patient's  history were reviewed and updated as appropriate: allergies, current medications, past family history, past medical history, past social history, past surgical history, and problem list.    Past Medical History:   Diagnosis Date    Anemia in neoplastic disease     Arthritis     Arthritis of back     Arthritis of neck     Breast cancer     Left    CVA (cerebral vascular accident)     Encounter for long-term (current) use of other medications 06/22/2021    Fracture, fibula     Fracture, finger     Frozen shoulder     Hip arthrosis 01/17    Hip pain     RIGHT HIP... CYST    History of fracture of leg 1987    History of radiation therapy     LAST TREATMENT      Hypertension     Knee swelling     Limited joint range of motion (ROM)     RIGHT HIP    Low back strain     Periarthritis of shoulder     Rotator cuff syndrome 6/22    Skin sore     OPEN SORE LEFT BREAST    Syncope     Vertigo         Past Surgical History:   Procedure Laterality Date    AXILLARY LYMPH NODE BIOPSY/EXCISION Right     LYMPH NODE UNDER RIGHT ARM-MALIGNANT (DOUBLE MASTECTOMY)    BREAST BIOPSY Left     MALIGNANT    FRACTURE SURGERY  1987    Leg    HARDWARE REMOVAL      INCISION AND DRAINAGE LEG Left 06/30/2022    Procedure: INCISION AND DRAINAGE left ankle;  Surgeon: Kenneth Tran MD;  Location: [facility];  Service: Orthopedics;  Laterality: Left;    JOINT REPLACEMENT Bilateral mar 2020,july 2021    hips    MASTECTOMY W/ SENTINEL NODE BIOPSY Bilateral 09/16/2019    Procedure: BILATERLA MODIFIED RADICAL MASTECTOMY WITH BILATERAL SENTINEL LYMPH NODE BIOPSY;  Surgeon: Joby Barron Jr., MD;  Location: [facility];  Service: General    TOTAL HIP ARTHROPLASTY Right 03/02/2020    Procedure: RIGHT TOTAL HIP ARTHROPLASTY NATALY NAVIGATION;  Surgeon: Luis M Leonard MD;  Location: [facility];  Service: Orthopedics;  Laterality: Right;    TOTAL HIP ARTHROPLASTY Left 07/20/2021    Procedure: Posterior LEFT TOTAL HIP ARTHROPLASTY  "NATALY NAVIGATION;  Surgeon: Luis M Leonard MD;  Location: [facility] MAIN OR;  Service: Orthopedics;  Laterality: Left;    VENOUS ACCESS DEVICE (PORT) INSERTION Right 2019    Procedure: INSERTION VENOUS ACCESS DEVICE;  Surgeon: Joby Barron Jr., MD;  Location:  [facility];  Service: General    VENOUS ACCESS DEVICE (PORT) REMOVAL N/A 10/30/2019    Procedure: Mediport Removal;  Surgeon: Joby Barron Jr., MD;  Location: [facility] MAIN OR;  Service: General        Family History   Problem Relation Age of Onset    Lung cancer Father     Irritable bowel syndrome Father     Cancer Mother     Breast cancer Mother     Liver disease Mother     Breast cancer Sister 48    Breast cancer Maternal Grandmother     Breast cancer Paternal Grandmother     Breast cancer Maternal Uncle     Malig Hyperthermia Neg Hx         Social History     Socioeconomic History    Marital status:      Spouse name: Cruz    Number of children: 0   Tobacco Use    Smoking status: Never    Smokeless tobacco: Never   Vaping Use    Vaping status: Never Used   Substance and Sexual Activity    Alcohol use: Yes     Comment: occasional    Drug use: No    Sexual activity: Not Currently     Partners: Male     Birth control/protection: Abstinence        OB History          0    Para   0    Term   0       0    AB   0    Living   0         SAB   0    IAB   0    Ectopic   0    Molar   0    Multiple   0    Live Births   0                 Allergies   Allergen Reactions    Benadryl [Diphenhydramine] Itching     INCREASES BLOOD PRESSURE    Erythromycin GI Intolerance    Levaquin [Levofloxacin] GI Intolerance    Penicillins GI Intolerance      Review of Systems  Review of systems mentioned HPI otherwise negative    Objective   Blood pressure 107/72, pulse 70, temperature 98.1 °F (36.7 °C), temperature source Oral, resp. rate 16, height 165.1 cm (65\"), weight 79.9 kg (176 lb 1.6 oz), SpO2 96%, not currently breastfeeding.   Limited due to " video visit  Physical Exam  Vitals reviewed.   Constitutional:       Appearance: Normal appearance. She is normal weight.   HENT:      Nose: Nose normal.   Eyes:      Conjunctiva/sclera: Conjunctivae normal.   Cardiovascular:      Rate and Rhythm: Normal rate.   Pulmonary:      Effort: Pulmonary effort is normal.   Musculoskeletal:      Cervical back: Normal range of motion.   Skin:     General: Skin is warm.      Findings: Rash (dry/raised on upper arms (stable)) present.   Neurological:      General: No focal deficit present.      Mental Status: She is alert.   Psychiatric:         Mood and Affect: Mood normal.         Behavior: Behavior normal.         Thought Content: Thought content normal.         Judgment: Judgment normal.       I have reexamined the patient and the results are consistent with the previously documented exam except as updated. Anjali Herring, APRN      Lab on 03/11/2025   Component Date Value Ref Range Status    Glucose 03/11/2025 105 (H)  65 - 99 mg/dL Final    BUN 03/11/2025 15  8 - 23 mg/dL Final    Creatinine 03/11/2025 0.91  0.57 - 1.00 mg/dL Final    Sodium 03/11/2025 140  136 - 145 mmol/L Final    Potassium 03/11/2025 4.2  3.5 - 5.2 mmol/L Final    Chloride 03/11/2025 104  98 - 107 mmol/L Final    CO2 03/11/2025 24.9  22.0 - 29.0 mmol/L Final    Calcium 03/11/2025 9.5  8.6 - 10.5 mg/dL Final    Total Protein 03/11/2025 6.6  6.0 - 8.5 g/dL Final    Albumin 03/11/2025 4.2  3.5 - 5.2 g/dL Final    ALT (SGPT) 03/11/2025 17  1 - 33 U/L Final    AST (SGOT) 03/11/2025 23  1 - 32 U/L Final    Alkaline Phosphatase 03/11/2025 100  39 - 117 U/L Final    Total Bilirubin 03/11/2025 0.4  0.0 - 1.2 mg/dL Final    Globulin 03/11/2025 2.4  gm/dL Final    A/G Ratio 03/11/2025 1.8  g/dL Final    BUN/Creatinine Ratio 03/11/2025 16.5  7.0 - 25.0 Final    Anion Gap 03/11/2025 11.1  5.0 - 15.0 mmol/L Final    eGFR 03/11/2025 69.3  >60.0 mL/min/1.73 Final    WBC 03/11/2025 2.46 (L)  3.40 - 10.80  10*3/mm3 Final    RBC 03/11/2025 4.28  3.77 - 5.28 10*6/mm3 Final    Hemoglobin 03/11/2025 13.1  12.0 - 15.9 g/dL Final    Hematocrit 03/11/2025 40.4  34.0 - 46.6 % Final    MCV 03/11/2025 94.4  79.0 - 97.0 fL Final    MCH 03/11/2025 30.6  26.6 - 33.0 pg Final    MCHC 03/11/2025 32.4  31.5 - 35.7 g/dL Final    RDW 03/11/2025 14.2  12.3 - 15.4 % Final    RDW-SD 03/11/2025 48.9  37.0 - 54.0 fl Final    MPV 03/11/2025 8.6  6.0 - 12.0 fL Final    Platelets 03/11/2025 142  140 - 450 10*3/mm3 Final    Neutrophil % 03/11/2025 61.0  42.7 - 76.0 % Final    Lymphocyte % 03/11/2025 31.3  19.6 - 45.3 % Final    Monocyte % 03/11/2025 3.7 (L)  5.0 - 12.0 % Final    Eosinophil % 03/11/2025 2.8  0.3 - 6.2 % Final    Basophil % 03/11/2025 0.8  0.0 - 1.5 % Final    Immature Grans % 03/11/2025 0.4  0.0 - 0.5 % Final    Neutrophils, Absolute 03/11/2025 1.50 (L)  1.70 - 7.00 10*3/mm3 Final    Lymphocytes, Absolute 03/11/2025 0.77  0.70 - 3.10 10*3/mm3 Final    Monocytes, Absolute 03/11/2025 0.09 (L)  0.10 - 0.90 10*3/mm3 Final    Eosinophils, Absolute 03/11/2025 0.07  0.00 - 0.40 10*3/mm3 Final    Basophils, Absolute 03/11/2025 0.02  0.00 - 0.20 10*3/mm3 Final    Immature Grans, Absolute 03/11/2025 0.01  0.00 - 0.05 10*3/mm3 Final    nRBC 03/11/2025 0.0  0.0 - 0.2 /100 WBC Final   Lab on 02/14/2025   Component Date Value Ref Range Status    CA 15-3 02/14/2025 20.5  <=25.0 U/mL Final    CA 27.29 02/14/2025 22.3  0.0 - 38.6 U/mL Final    [employer] Immunochemiluminometric Methodology (ICMA)  Values obtained with different assay methods or kits cannot be used  interchangeably. Results cannot be interpreted as absolute evidence  of the presence or absence of malignant disease.    Glucose 02/14/2025 109 (H)  65 - 99 mg/dL Final    BUN 02/14/2025 16  8 - 23 mg/dL Final    Creatinine 02/14/2025 0.82  0.57 - 1.00 mg/dL Final    Sodium 02/14/2025 141  136 - 145 mmol/L Final    Potassium 02/14/2025 4.0  3.5 - 5.2 mmol/L Final    Chloride  02/14/2025 104  98 - 107 mmol/L Final    CO2 02/14/2025 25.7  22.0 - 29.0 mmol/L Final    Calcium 02/14/2025 10.0  8.6 - 10.5 mg/dL Final    Total Protein 02/14/2025 7.0  6.0 - 8.5 g/dL Final    Albumin 02/14/2025 4.5  3.5 - 5.2 g/dL Final    ALT (SGPT) 02/14/2025 22  1 - 33 U/L Final    AST (SGOT) 02/14/2025 27  1 - 32 U/L Final    Alkaline Phosphatase 02/14/2025 98  39 - 117 U/L Final    Total Bilirubin 02/14/2025 0.5  0.0 - 1.2 mg/dL Final    Globulin 02/14/2025 2.5  gm/dL Final    A/G Ratio 02/14/2025 1.8  g/dL Final    BUN/Creatinine Ratio 02/14/2025 19.5  7.0 - 25.0 Final    Anion Gap 02/14/2025 11.3  5.0 - 15.0 mmol/L Final    eGFR 02/14/2025 78.5  >60.0 mL/min/1.73 Final    WBC 02/14/2025 3.65  3.40 - 10.80 10*3/mm3 Final    RBC 02/14/2025 4.71  3.77 - 5.28 10*6/mm3 Final    Hemoglobin 02/14/2025 14.2  12.0 - 15.9 g/dL Final    Hematocrit 02/14/2025 43.9  34.0 - 46.6 % Final    MCV 02/14/2025 93.2  79.0 - 97.0 fL Final    MCH 02/14/2025 30.1  26.6 - 33.0 pg Final    MCHC 02/14/2025 32.3  31.5 - 35.7 g/dL Final    RDW 02/14/2025 14.2  12.3 - 15.4 % Final    RDW-SD 02/14/2025 48.6  37.0 - 54.0 fl Final    MPV 02/14/2025 8.0  6.0 - 12.0 fL Final    Platelets 02/14/2025 152  140 - 450 10*3/mm3 Final    Neutrophil % 02/14/2025 62.2  42.7 - 76.0 % Final    Lymphocyte % 02/14/2025 25.5  19.6 - 45.3 % Final    Monocyte % 02/14/2025 6.3  5.0 - 12.0 % Final    Eosinophil % 02/14/2025 5.2  0.3 - 6.2 % Final    Basophil % 02/14/2025 0.8  0.0 - 1.5 % Final    Immature Grans % 02/14/2025 0.0  0.0 - 0.5 % Final    Neutrophils, Absolute 02/14/2025 2.27  1.70 - 7.00 10*3/mm3 Final    Lymphocytes, Absolute 02/14/2025 0.93  0.70 - 3.10 10*3/mm3 Final    Monocytes, Absolute 02/14/2025 0.23  0.10 - 0.90 10*3/mm3 Final    Eosinophils, Absolute 02/14/2025 0.19  0.00 - 0.40 10*3/mm3 Final    Basophils, Absolute 02/14/2025 0.03  0.00 - 0.20 10*3/mm3 Final    Immature Grans, Absolute 02/14/2025 0.00  0.00 - 0.05 10*3/mm3 Final     nRBC 02/14/2025 0.0  0.0 - 0.2 /100 WBC Final        No radiology results for the last 30 days.     CT of the chest abdomen and pelvis and bone scan images independently reviewed and interpreted by me in detail summarized above    Assessment & Plan       *Metastatic ER positive breast cancer  Initially presented with neglected breast in 2019 treated with neoadjuvant Adriamycin and Cytoxan followed by Taxol followed by bilateral mastectomies and extra lymph node dissection and adjuvant radiation.  Started on adjuvant Femara after completion of chemotherapy surgery and radiation in 2019  Metastatic recurrence of disease in May 2021 and hence Ribociclib initiated.  Subsequent acute hepatitis from Ribociclib and hence Ribociclib and Femara discontinued  Started Aromasin in March 2022  Metastatic disease recurrence with pericardial mass in January 2024 biopsy to be ER positive MS positive and HER2 0, [facility]us demonstrates ESR 1 mutation  Started back on Ribociclib and continued Aromasin in January 2024  Significant elevation in LFTs after initiation of Ribociclib resulting in discontinuation of all treatment in March 2024  She was also initiated on steroids to help with the hepatitis.  Palliative radiation to the mediastinal mass completed in June 2024  CT of the chest abdomen pelvis from June 2024 shows decrease in size of the mass.  Initiated Faslodex in July 2024  CT of the chest abdomen and pelvis and bone scan 10/21/2024 with stable disease except for new enhancing nodule at the right margin of the pericardial mass.  2/7/2025-CT of the chest abdomen pelvis with increase in size of internal mammary chain lymph node as well as increase in size of the epicardial nodule suggestive of disease progression.  Patient previously was on Ribociclib which had to be discontinued due to hepatitis and hence she was continued on endocrine therapy alone.  Given that she has never truly been on a CDK 4 6 inhibitor, we could consider  continuing the Faslodex along with addition of palbociclib.  2/28/2025 Initiating palbociclib at 100 mg 3 out of 4 weeks  3/11/2025 here today tox check after initiating palbociclib.  She is approximately 12 days in.  She has some expected leukopenia, WBC 2.46, ANC 1.5.  Okay to continue therapy at this level.  She is also continuing monthly Faslodex, due next week.    *Previous Ribociclib induced hepatitis  Patient has developed increase in LFTs on both occasions after use of Ribociclib.  This has been permanently discontinued  Patient has also been started on prednisone after the most recent acute hepatitis from Ribociclib  Currently she is on a 5 mg dose of prednisone  Recommend that she decrease this to 2.5 mg for the next week and subsequently discontinue.  LFTs remain normal today  Start palbociclib  Monitor LFTs closely while on palbociclib.  Currently normal.    *Ovarian cyst  Gradual increase in size of the ovarian cyst on the left.  The left ovarian cyst continues to increase in size  Continue to monitor for now     *Atrial fibrillation  Continue follow-up with Dr. Danni Odell with cardiology  Heart rate normal at stable  Continues on metoprolol.  Continue Eliquis    *Lymphedema of left upper extremity  Continue follow-up with lymphedema clinic    *Hypertension  Blood pressure well-controlled at BP: 107/72     *Vertigo  Intermittent  Somewhat worse more recently  She is currently not using any medications for the same  Could benefit from physical therapy.    *Headaches  Mild and tolerable  Continue Tylenol as needed    PLAN   Continue palbociclib 100 mg daily to be taken for 3 weeks on, 1 week off (she is 12 days in as of today).  She will return in 1 week for follow-up with Dr. Miguel and continue close monitoring.  She will be due for Faslodex at that time, continuing this every 4 weeks.  Continue topical creams for rash/dry skin related to Faslodex.  CBC and CMP at each visit    This patient is on high  risk drug therapy requiring intensive monitoring for toxicity.

## 2025-03-10 ENCOUNTER — SPECIALTY PHARMACY (OUTPATIENT)
Dept: PHARMACY | Facility: HOSPITAL | Age: 65
End: 2025-03-10
Payer: MEDICARE

## 2025-03-10 NOTE — PROGRESS NOTES
Specialty Pharmacy Patient Management Program  Clinical Outreach     I called Marylu Georgse on 3/10/2025 12:09 EDT who is enrolled in the Oncology Patient Management program offered by [facility].  Patient did not answer today. I left a voice message with my call back number, [phone].    Gina Lopez, PharmD, [facility]  Clinical Specialty Pharmacist, Oncology  3/10/2025  12:09 EDT

## 2025-03-11 ENCOUNTER — LAB (OUTPATIENT)
Dept: LAB | Facility: HOSPITAL | Age: 65
End: 2025-03-11
Payer: MEDICARE

## 2025-03-11 ENCOUNTER — OFFICE VISIT (OUTPATIENT)
Dept: ONCOLOGY | Facility: CLINIC | Age: 65
End: 2025-03-11
Payer: MEDICARE

## 2025-03-11 VITALS
HEIGHT: 65 IN | BODY MASS INDEX: 29.34 KG/M2 | SYSTOLIC BLOOD PRESSURE: 107 MMHG | WEIGHT: 176.1 LBS | RESPIRATION RATE: 16 BRPM | HEART RATE: 70 BPM | TEMPERATURE: 98.1 F | DIASTOLIC BLOOD PRESSURE: 72 MMHG | OXYGEN SATURATION: 96 %

## 2025-03-11 DIAGNOSIS — C50.811 MALIGNANT NEOPLASM OF OVERLAPPING SITES OF BOTH BREASTS IN FEMALE, ESTROGEN RECEPTOR POSITIVE: Primary | ICD-10-CM

## 2025-03-11 DIAGNOSIS — Z79.899 HIGH RISK MEDICATION USE: ICD-10-CM

## 2025-03-11 DIAGNOSIS — C50.812 MALIGNANT NEOPLASM OF OVERLAPPING SITES OF LEFT BREAST IN FEMALE, ESTROGEN RECEPTOR POSITIVE: ICD-10-CM

## 2025-03-11 DIAGNOSIS — Z17.0 MALIGNANT NEOPLASM OF OVERLAPPING SITES OF LEFT BREAST IN FEMALE, ESTROGEN RECEPTOR POSITIVE: ICD-10-CM

## 2025-03-11 DIAGNOSIS — Z17.0 MALIGNANT NEOPLASM OF OVERLAPPING SITES OF BOTH BREASTS IN FEMALE, ESTROGEN RECEPTOR POSITIVE: Primary | ICD-10-CM

## 2025-03-11 DIAGNOSIS — C50.812 MALIGNANT NEOPLASM OF OVERLAPPING SITES OF BOTH BREASTS IN FEMALE, ESTROGEN RECEPTOR POSITIVE: Primary | ICD-10-CM

## 2025-03-11 LAB
ALBUMIN SERPL-MCNC: 4.2 G/DL (ref 3.5–5.2)
ALBUMIN/GLOB SERPL: 1.8 G/DL
ALP SERPL-CCNC: 100 U/L (ref 39–117)
ALT SERPL W P-5'-P-CCNC: 17 U/L (ref 1–33)
ANION GAP SERPL CALCULATED.3IONS-SCNC: 11.1 MMOL/L (ref 5–15)
AST SERPL-CCNC: 23 U/L (ref 1–32)
BASOPHILS # BLD AUTO: 0.02 10*3/MM3 (ref 0–0.2)
BASOPHILS NFR BLD AUTO: 0.8 % (ref 0–1.5)
BILIRUB SERPL-MCNC: 0.4 MG/DL (ref 0–1.2)
BUN SERPL-MCNC: 15 MG/DL (ref 8–23)
BUN/CREAT SERPL: 16.5 (ref 7–25)
CALCIUM SPEC-SCNC: 9.5 MG/DL (ref 8.6–10.5)
CHLORIDE SERPL-SCNC: 104 MMOL/L (ref 98–107)
CO2 SERPL-SCNC: 24.9 MMOL/L (ref 22–29)
CREAT SERPL-MCNC: 0.91 MG/DL (ref 0.57–1)
DEPRECATED RDW RBC AUTO: 48.9 FL (ref 37–54)
EGFRCR SERPLBLD CKD-EPI 2021: 69.3 ML/MIN/1.73
EOSINOPHIL # BLD AUTO: 0.07 10*3/MM3 (ref 0–0.4)
EOSINOPHIL NFR BLD AUTO: 2.8 % (ref 0.3–6.2)
ERYTHROCYTE [DISTWIDTH] IN BLOOD BY AUTOMATED COUNT: 14.2 % (ref 12.3–15.4)
GLOBULIN UR ELPH-MCNC: 2.4 GM/DL
GLUCOSE SERPL-MCNC: 105 MG/DL (ref 65–99)
HCT VFR BLD AUTO: 40.4 % (ref 34–46.6)
HGB BLD-MCNC: 13.1 G/DL (ref 12–15.9)
IMM GRANULOCYTES # BLD AUTO: 0.01 10*3/MM3 (ref 0–0.05)
IMM GRANULOCYTES NFR BLD AUTO: 0.4 % (ref 0–0.5)
LYMPHOCYTES # BLD AUTO: 0.77 10*3/MM3 (ref 0.7–3.1)
LYMPHOCYTES NFR BLD AUTO: 31.3 % (ref 19.6–45.3)
MCH RBC QN AUTO: 30.6 PG (ref 26.6–33)
MCHC RBC AUTO-ENTMCNC: 32.4 G/DL (ref 31.5–35.7)
MCV RBC AUTO: 94.4 FL (ref 79–97)
MONOCYTES # BLD AUTO: 0.09 10*3/MM3 (ref 0.1–0.9)
MONOCYTES NFR BLD AUTO: 3.7 % (ref 5–12)
NEUTROPHILS NFR BLD AUTO: 1.5 10*3/MM3 (ref 1.7–7)
NEUTROPHILS NFR BLD AUTO: 61 % (ref 42.7–76)
NRBC BLD AUTO-RTO: 0 /100 WBC (ref 0–0.2)
PLATELET # BLD AUTO: 142 10*3/MM3 (ref 140–450)
PMV BLD AUTO: 8.6 FL (ref 6–12)
POTASSIUM SERPL-SCNC: 4.2 MMOL/L (ref 3.5–5.2)
PROT SERPL-MCNC: 6.6 G/DL (ref 6–8.5)
RBC # BLD AUTO: 4.28 10*6/MM3 (ref 3.77–5.28)
SODIUM SERPL-SCNC: 140 MMOL/L (ref 136–145)
WBC NRBC COR # BLD AUTO: 2.46 10*3/MM3 (ref 3.4–10.8)

## 2025-03-11 PROCEDURE — 80053 COMPREHEN METABOLIC PANEL: CPT

## 2025-03-11 PROCEDURE — 85025 COMPLETE CBC W/AUTO DIFF WBC: CPT

## 2025-03-11 PROCEDURE — 36415 COLL VENOUS BLD VENIPUNCTURE: CPT

## 2025-03-12 ENCOUNTER — SPECIALTY PHARMACY (OUTPATIENT)
Dept: PHARMACY | Facility: HOSPITAL | Age: 65
End: 2025-03-12
Payer: MEDICARE

## 2025-03-12 NOTE — PROGRESS NOTES
Specialty Pharmacy Patient Management Program       Specialty Pharmacy Note: Ibrance (palbociclib)    Marylu Georges is a 65 y.o. female with breast cancer was seen 3/11/25 by APRN. Per provider dictation, no changes to oral oncology regimen Ibrance (palbociclib).  Labs Review: The CMP and CBC from 3/11/25 have been reviewed. No dose adjustments are needed for the oral specialty medication(s) based on the labs.    Specialty pharmacy will continue to follow patient.    Gina Lopez, PharmD, [facility]  Clinical Specialty Pharmacist, Oncology  3/12/2025  09:28 EDT

## 2025-03-12 NOTE — PROGRESS NOTES
Specialty Pharmacy Patient Management Program  [facility] New Start Outreach     Marylu Georges is a 65 y.o. female seen by an Oncology provider and enrolled in the Oncology Patient Management program offered by [facility].  A follow-up outreach for [employer] Oncology medication, Ibrance, was conducted within 7-14 days of therapy initiation.     Linked Medication(s) Assessed: Palbociclib    Based on the patient or caregiver's response, how has the patient's status changed since the start of therapy?: Unchanged    Is the patient or caregiver reporting any adverse effects since the beginning of therapy?: No    Name/Credentials: Gina Lopez, DaylinD, BCPS    3/12/2025  09:30 EDT

## 2025-03-18 RX ORDER — LAMOTRIGINE 25 MG/1
500 TABLET ORAL ONCE
Status: CANCELLED | OUTPATIENT
Start: 2025-03-18

## 2025-03-19 ENCOUNTER — LAB (OUTPATIENT)
Dept: LAB | Facility: HOSPITAL | Age: 65
End: 2025-03-19
Payer: MEDICARE

## 2025-03-19 ENCOUNTER — OFFICE VISIT (OUTPATIENT)
Dept: ONCOLOGY | Facility: CLINIC | Age: 65
End: 2025-03-19
Payer: MEDICARE

## 2025-03-19 ENCOUNTER — INFUSION (OUTPATIENT)
Dept: ONCOLOGY | Facility: HOSPITAL | Age: 65
End: 2025-03-19
Payer: MEDICARE

## 2025-03-19 VITALS
HEART RATE: 66 BPM | DIASTOLIC BLOOD PRESSURE: 89 MMHG | HEIGHT: 65 IN | SYSTOLIC BLOOD PRESSURE: 122 MMHG | WEIGHT: 177.5 LBS | RESPIRATION RATE: 16 BRPM | OXYGEN SATURATION: 99 % | BODY MASS INDEX: 29.57 KG/M2 | TEMPERATURE: 98.2 F

## 2025-03-19 DIAGNOSIS — Z17.0 MALIGNANT NEOPLASM OF OVERLAPPING SITES OF BOTH BREASTS IN FEMALE, ESTROGEN RECEPTOR POSITIVE: ICD-10-CM

## 2025-03-19 DIAGNOSIS — C50.811 MALIGNANT NEOPLASM OF OVERLAPPING SITES OF BOTH BREASTS IN FEMALE, ESTROGEN RECEPTOR POSITIVE: ICD-10-CM

## 2025-03-19 DIAGNOSIS — L27.0 DRUG RASH: ICD-10-CM

## 2025-03-19 DIAGNOSIS — Z17.0 MALIGNANT NEOPLASM OF OVERLAPPING SITES OF LEFT BREAST IN FEMALE, ESTROGEN RECEPTOR POSITIVE: Primary | ICD-10-CM

## 2025-03-19 DIAGNOSIS — C50.812 MALIGNANT NEOPLASM OF OVERLAPPING SITES OF LEFT BREAST IN FEMALE, ESTROGEN RECEPTOR POSITIVE: ICD-10-CM

## 2025-03-19 DIAGNOSIS — C50.812 MALIGNANT NEOPLASM OF OVERLAPPING SITES OF LEFT BREAST IN FEMALE, ESTROGEN RECEPTOR POSITIVE: Primary | ICD-10-CM

## 2025-03-19 DIAGNOSIS — C50.812 MALIGNANT NEOPLASM OF OVERLAPPING SITES OF BOTH BREASTS IN FEMALE, ESTROGEN RECEPTOR POSITIVE: Primary | ICD-10-CM

## 2025-03-19 DIAGNOSIS — C50.812 MALIGNANT NEOPLASM OF OVERLAPPING SITES OF BOTH BREASTS IN FEMALE, ESTROGEN RECEPTOR POSITIVE: ICD-10-CM

## 2025-03-19 DIAGNOSIS — Z17.0 MALIGNANT NEOPLASM OF OVERLAPPING SITES OF LEFT BREAST IN FEMALE, ESTROGEN RECEPTOR POSITIVE: ICD-10-CM

## 2025-03-19 DIAGNOSIS — Z17.0 MALIGNANT NEOPLASM OF OVERLAPPING SITES OF BOTH BREASTS IN FEMALE, ESTROGEN RECEPTOR POSITIVE: Primary | ICD-10-CM

## 2025-03-19 DIAGNOSIS — C50.811 MALIGNANT NEOPLASM OF OVERLAPPING SITES OF BOTH BREASTS IN FEMALE, ESTROGEN RECEPTOR POSITIVE: Primary | ICD-10-CM

## 2025-03-19 LAB
ALBUMIN SERPL-MCNC: 4.4 G/DL (ref 3.5–5.2)
ALBUMIN/GLOB SERPL: 1.8 G/DL
ALP SERPL-CCNC: 97 U/L (ref 39–117)
ALT SERPL W P-5'-P-CCNC: 15 U/L (ref 1–33)
ANION GAP SERPL CALCULATED.3IONS-SCNC: 8 MMOL/L (ref 5–15)
AST SERPL-CCNC: 20 U/L (ref 1–32)
BASOPHILS # BLD AUTO: 0.02 10*3/MM3 (ref 0–0.2)
BASOPHILS NFR BLD AUTO: 1.1 % (ref 0–1.5)
BILIRUB SERPL-MCNC: 0.4 MG/DL (ref 0–1.2)
BUN SERPL-MCNC: 17 MG/DL (ref 8–23)
BUN/CREAT SERPL: 19.8 (ref 7–25)
CALCIUM SPEC-SCNC: 9.4 MG/DL (ref 8.6–10.5)
CHLORIDE SERPL-SCNC: 106 MMOL/L (ref 98–107)
CO2 SERPL-SCNC: 25 MMOL/L (ref 22–29)
CREAT SERPL-MCNC: 0.86 MG/DL (ref 0.57–1)
DEPRECATED RDW RBC AUTO: 49.5 FL (ref 37–54)
EGFRCR SERPLBLD CKD-EPI 2021: 74.2 ML/MIN/1.73
EOSINOPHIL # BLD AUTO: 0.04 10*3/MM3 (ref 0–0.4)
EOSINOPHIL NFR BLD AUTO: 2.2 % (ref 0.3–6.2)
ERYTHROCYTE [DISTWIDTH] IN BLOOD BY AUTOMATED COUNT: 14.6 % (ref 12.3–15.4)
GLOBULIN UR ELPH-MCNC: 2.4 GM/DL
GLUCOSE SERPL-MCNC: 95 MG/DL (ref 65–99)
HCT VFR BLD AUTO: 38.2 % (ref 34–46.6)
HGB BLD-MCNC: 12.4 G/DL (ref 12–15.9)
IMM GRANULOCYTES # BLD AUTO: 0.01 10*3/MM3 (ref 0–0.05)
IMM GRANULOCYTES NFR BLD AUTO: 0.5 % (ref 0–0.5)
LYMPHOCYTES # BLD AUTO: 0.78 10*3/MM3 (ref 0.7–3.1)
LYMPHOCYTES NFR BLD AUTO: 42.6 % (ref 19.6–45.3)
MCH RBC QN AUTO: 30.7 PG (ref 26.6–33)
MCHC RBC AUTO-ENTMCNC: 32.5 G/DL (ref 31.5–35.7)
MCV RBC AUTO: 94.6 FL (ref 79–97)
MONOCYTES # BLD AUTO: 0.16 10*3/MM3 (ref 0.1–0.9)
MONOCYTES NFR BLD AUTO: 8.7 % (ref 5–12)
NEUTROPHILS NFR BLD AUTO: 0.82 10*3/MM3 (ref 1.7–7)
NEUTROPHILS NFR BLD AUTO: 44.9 % (ref 42.7–76)
NRBC BLD AUTO-RTO: 0 /100 WBC (ref 0–0.2)
PLATELET # BLD AUTO: 133 10*3/MM3 (ref 140–450)
PMV BLD AUTO: 8.8 FL (ref 6–12)
POTASSIUM SERPL-SCNC: 4.5 MMOL/L (ref 3.5–5.2)
PROT SERPL-MCNC: 6.8 G/DL (ref 6–8.5)
RBC # BLD AUTO: 4.04 10*6/MM3 (ref 3.77–5.28)
SODIUM SERPL-SCNC: 139 MMOL/L (ref 136–145)
WBC NRBC COR # BLD AUTO: 1.83 10*3/MM3 (ref 3.4–10.8)

## 2025-03-19 PROCEDURE — 80053 COMPREHEN METABOLIC PANEL: CPT | Performed by: INTERNAL MEDICINE

## 2025-03-19 PROCEDURE — 96402 CHEMO HORMON ANTINEOPL SQ/IM: CPT

## 2025-03-19 PROCEDURE — 36415 COLL VENOUS BLD VENIPUNCTURE: CPT

## 2025-03-19 PROCEDURE — 25010000002 FULVESTRANT PER 25 MG: Performed by: NURSE PRACTITIONER

## 2025-03-19 PROCEDURE — 85025 COMPLETE CBC W/AUTO DIFF WBC: CPT

## 2025-03-19 RX ORDER — LAMOTRIGINE 25 MG/1
500 TABLET ORAL ONCE
Status: COMPLETED | OUTPATIENT
Start: 2025-03-19 | End: 2025-03-19

## 2025-03-19 RX ADMIN — FULVESTRANT 500 MG: 50 INJECTION INTRAMUSCULAR at 09:01

## 2025-03-19 NOTE — PROGRESS NOTES
Subjective   Marylu Georges is a 65 y.o. female.  With metastatic ER positive breast cancer.    History of Present Illness     Patient is a postmenopausal 65-year-old  lady who initially presented with a neglected left breast with axillary lymphadenopathy which was measuring up to 9 cm.  The tumor was fixed at the time of presentation.  She was treated with neoadjuvant Adriamycin and Cytoxan followed by Taxol in 2019 and subsequently underwent adjuvant radiation to the left chest wall and axilla.  Placed on adjuvant Femara.      PET/CT on 5/19/2021 which showed 2.6 x 1 cm enlarged lymph node in the right costophrenic fat pad which was new.  SUV 6.8.  Findings consistent with localized metastatic disease.  No other foci of pathological hypermetabolic some identified in the chest.  7 x 4.8 cm unilocular cyst in the left adnexa most likely arises from the ovary.  Slight increase in size since previous scan when it measured 5.5 cm.  The cyst is photopenic supporting a benign etiology.    Patient was initiated on Ribociclib in May 2021 and Femara continued.  September 2021 it was noted that LFTs were elevated and Ribociclib and Femara were placed on hold.  CT chest performed in November 2021 showed resolution of the cardiophrenic angle lymph node.    Resumed Femara only in November 2021.    Patient was seen again 12/29/2021 and had persistent elevation of LFTs although not any worse compared to November 2021.  In fact LFTs had improved with an ALT of 326 and AST of 167.  Due to persistent elevation of the LFTs Femara was discontinued.    1/25/2022-improvement in LFTs with ALT of 90 and AST of 55.  Patient also had liver biopsy which was showing hepatitis.    3/7/2022-patient was initiated on Aromasin 25 mg daily.    In December 2023 patient complained of intermittent chest pain.  This was further evaluated with a CT angiogram of the chest performed on 12/29/2023.  There was an irregular heterogeneous anterior  pericardial mass which extends into the anterior chest wall measuring 6.5 cm transversely and on coronal series 6.5 x 5.2 cm.  There is thickening of the pericardium along the base of the mass and adjacent to the mass as well.  Appearance is suspicious for metastatic disease versus primary malignancy.  Enlarged nodes at both epicardial fat pads largest measuring 1.4 x 0.9 cm.  No evidence of pulmonary embolism.    1/23/2024-PET/CT with enlarging intensely avid anterior pericardial soft tissue mass representing patient's known malignancy.  Adjacent cardiophrenic soft tissue nodules and lymph nodes which are also larger when compared to September 2022 scan.  This raises concern for metastatic disease.  Scattered bilateral subcentimeter pulmonary nodules.  Focal uptake within the pubic symphysis with a focus of osteolysis and apparent mild widening increased from September 2022.  Metastatic disease would be atypical and underlying synovitis or infection cannot be excluded.    Adnexal cyst has increased in size since September 2022 and now measures 6.2 x 9.5 cm.    1/15/2024 biopsy of the mediastinal mass was consistent with metastatic breast cancer.  ER +81 to 90% strong  VT +2% weak  HER2 negative, score 0    Tempus NGS with ESR 1 mutation.    Ribociclib was resumed in January 2024 and LFTs monitored closely.    In March 2024 LFTs started to spike up and subsequently Ribociclib as well as Aromasin was discontinued.     She completed radiation to the metastatic deposit in front of the pericardium in June 2024.    Initiated Faslodex in July 2024.    6/26/2024-CT of the chest abdomen and pelvis  Slight decrease in size of the anterior pericardial mass  Small right pelvic cyst appears stable when compared to 9/7/2022.  Larger left pelvic cyst measures 10.5 x 6.4 cm which has increased in size since September 2022.  Further evaluation is suggested.  If conservative management is elected then short-term follow-up with CT  pelvis recommended.  Status post bilateral mastectomies and bilateral axillary node dissection.      10/21/2024-CT of the chest abdomen and pelvis-decrease in size of the anterior pericardial mass measuring 5.5 x 1.2 cm, previously 5.6 x 1.6 cm.  New enhancing nodule along the right margin measuring 0.7 x 0.6 cm.  No mediastinal or hilar lymphadenopathy.  New bandlike opacity in the lateral aspect of the right apex no suspicious nodularity but focus reevaluation is recommended on subsequent CT chest with contrast.  Stable sub-6 mm left lower lobe pulmonary nodule.  10 x 6.2 cm left adnexal mass.  Stable 3.3 cm right ovarian cyst.    10/21/2024-no evidence of metastatic disease on the bone scan.    2/7/2025-CT of the chest abdomen and pelvis-new right internal mammary chain lymphadenopathy measuring 2 x 1 cm in greatest dimension.  No hilar lymphadenopathy.  Right epicardial fat pad along the pericardium is larger now measuring 1.9 x 1.4 cm, previously 7 mm.  No evidence of metastatic disease in the abdomen  Left adnexal cystic mass not significantly changed measuring 11 x 6.3 cm right adnexal cystic mass measuring 3.8 x 2.9 cm.  Previously 3.3 x 2.5 cm.    2/7/2025-bone scan without any evidence of metastatic disease.    Interval History  Marylu returns today for follow-up, she continues on Faslodex and palbociclib.  She completes her first cycle of palbociclib On 3/21/2025.    She has so far tolerated palbociclib well.  Denies any nausea or vomiting.  She has developed a rash involving her creases at the elbow and also on her neck and on the eyelids.  She has had this rash before but it has gotten worse since starting palbociclib.    The following portions of the patient's history were reviewed and updated as appropriate: allergies, current medications, past family history, past medical history, past social history, past surgical history, and problem list.    Past Medical History:   Diagnosis Date    Anemia in  neoplastic disease     Arthritis     Arthritis of back     Arthritis of neck     Breast cancer     Left    CVA (cerebral vascular accident)     Encounter for long-term (current) use of other medications 06/22/2021    Fracture, fibula     Fracture, finger     Frozen shoulder     Hip arthrosis 01/17    Hip pain     RIGHT HIP... CYST    History of fracture of leg 1987    History of radiation therapy     LAST TREATMENT      Hypertension     Knee swelling     Limited joint range of motion (ROM)     RIGHT HIP    Low back strain     Periarthritis of shoulder     Rotator cuff syndrome 6/22    Skin sore     OPEN SORE LEFT BREAST    Syncope     Vertigo         Past Surgical History:   Procedure Laterality Date    AXILLARY LYMPH NODE BIOPSY/EXCISION Right     LYMPH NODE UNDER RIGHT ARM-MALIGNANT (DOUBLE MASTECTOMY)    BREAST BIOPSY Left     MALIGNANT    FRACTURE SURGERY  1987    Leg    HARDWARE REMOVAL      INCISION AND DRAINAGE LEG Left 06/30/2022    Procedure: INCISION AND DRAINAGE left ankle;  Surgeon: Kenneth Tran MD;  Location: [facility];  Service: Orthopedics;  Laterality: Left;    JOINT REPLACEMENT Bilateral mar 2020,july 2021    hips    MASTECTOMY W/ SENTINEL NODE BIOPSY Bilateral 09/16/2019    Procedure: BILATERLA MODIFIED RADICAL MASTECTOMY WITH BILATERAL SENTINEL LYMPH NODE BIOPSY;  Surgeon: Joby Barron Jr., MD;  Location: [facility];  Service: General    TOTAL HIP ARTHROPLASTY Right 03/02/2020    Procedure: RIGHT TOTAL HIP ARTHROPLASTY NATALY NAVIGATION;  Surgeon: Luis M Leonard MD;  Location: [facility];  Service: Orthopedics;  Laterality: Right;    TOTAL HIP ARTHROPLASTY Left 07/20/2021    Procedure: Posterior LEFT TOTAL HIP ARTHROPLASTY NATALY NAVIGATION;  Surgeon: Luis M Leonard MD;  Location: [facility] OR;  Service: Orthopedics;  Laterality: Left;    VENOUS ACCESS DEVICE (PORT) INSERTION Right 02/01/2019    Procedure: INSERTION VENOUS ACCESS DEVICE;  Surgeon: Anderson  "Joby Brito MD;  Location:  [facility];  Service: General    VENOUS ACCESS DEVICE (PORT) REMOVAL N/A 10/30/2019    Procedure: Mediport Removal;  Surgeon: Joby Barron Jr., MD;  Location: [facility] OR;  Service: General        Family History   Problem Relation Age of Onset    Lung cancer Father     Irritable bowel syndrome Father     Cancer Mother     Breast cancer Mother     Liver disease Mother     Breast cancer Sister 48    Breast cancer Maternal Grandmother     Breast cancer Paternal Grandmother     Breast cancer Maternal Uncle     Malig Hyperthermia Neg Hx         Social History     Socioeconomic History    Marital status:      Spouse name: Cruz    Number of children: 0   Tobacco Use    Smoking status: Never    Smokeless tobacco: Never   Vaping Use    Vaping status: Never Used   Substance and Sexual Activity    Alcohol use: Yes     Comment: occasional    Drug use: No    Sexual activity: Not Currently     Partners: Male     Birth control/protection: Abstinence        OB History          0    Para   0    Term   0       0    AB   0    Living   0         SAB   0    IAB   0    Ectopic   0    Molar   0    Multiple   0    Live Births   0                 Allergies   Allergen Reactions    Benadryl [Diphenhydramine] Itching     INCREASES BLOOD PRESSURE    Erythromycin GI Intolerance    Levaquin [Levofloxacin] GI Intolerance    Penicillins GI Intolerance      Review of Systems  Review of systems mentioned HPI otherwise negative    Objective   Blood pressure 122/89, pulse 66, temperature 98.2 °F (36.8 °C), temperature source Oral, resp. rate 16, height 165.1 cm (65\"), weight 80.5 kg (177 lb 8 oz), SpO2 99%, not currently breastfeeding.   Limited due to video visit  Physical Exam  Vitals reviewed.   Constitutional:       Appearance: Normal appearance. She is normal weight.   HENT:      Nose: Nose normal.   Eyes:      Conjunctiva/sclera: Conjunctivae normal.   Cardiovascular:      Rate and " Rhythm: Normal rate.   Pulmonary:      Effort: Pulmonary effort is normal.   Musculoskeletal:      Cervical back: Normal range of motion.   Skin:     General: Skin is warm.      Findings: Rash (dry/raised on upper arms (stable)) present.   Neurological:      General: No focal deficit present.      Mental Status: She is alert.   Psychiatric:         Mood and Affect: Mood normal.         Behavior: Behavior normal.         Thought Content: Thought content normal.         Judgment: Judgment normal.       I have reexamined the patient and the results are consistent with the previously documented exam except as updated. Kayley Miguel MD      Lab on 03/19/2025   Component Date Value Ref Range Status    WBC 03/19/2025 1.83 (L)  3.40 - 10.80 10*3/mm3 Final    RBC 03/19/2025 4.04  3.77 - 5.28 10*6/mm3 Final    Hemoglobin 03/19/2025 12.4  12.0 - 15.9 g/dL Final    Hematocrit 03/19/2025 38.2  34.0 - 46.6 % Final    MCV 03/19/2025 94.6  79.0 - 97.0 fL Final    MCH 03/19/2025 30.7  26.6 - 33.0 pg Final    MCHC 03/19/2025 32.5  31.5 - 35.7 g/dL Final    RDW 03/19/2025 14.6  12.3 - 15.4 % Final    RDW-SD 03/19/2025 49.5  37.0 - 54.0 fl Final    MPV 03/19/2025 8.8  6.0 - 12.0 fL Final    Platelets 03/19/2025 133 (L)  140 - 450 10*3/mm3 Final    Neutrophil % 03/19/2025 44.9  42.7 - 76.0 % Final    Lymphocyte % 03/19/2025 42.6  19.6 - 45.3 % Final    Monocyte % 03/19/2025 8.7  5.0 - 12.0 % Final    Eosinophil % 03/19/2025 2.2  0.3 - 6.2 % Final    Basophil % 03/19/2025 1.1  0.0 - 1.5 % Final    Immature Grans % 03/19/2025 0.5  0.0 - 0.5 % Final    Neutrophils, Absolute 03/19/2025 0.82 (L)  1.70 - 7.00 10*3/mm3 Final    Lymphocytes, Absolute 03/19/2025 0.78  0.70 - 3.10 10*3/mm3 Final    Monocytes, Absolute 03/19/2025 0.16  0.10 - 0.90 10*3/mm3 Final    Eosinophils, Absolute 03/19/2025 0.04  0.00 - 0.40 10*3/mm3 Final    Basophils, Absolute 03/19/2025 0.02  0.00 - 0.20 10*3/mm3 Final    Immature Grans, Absolute 03/19/2025 0.01  0.00  - 0.05 10*3/mm3 Final    nRBC 03/19/2025 0.0  0.0 - 0.2 /100 WBC Final   Lab on 03/11/2025   Component Date Value Ref Range Status    Glucose 03/11/2025 105 (H)  65 - 99 mg/dL Final    BUN 03/11/2025 15  8 - 23 mg/dL Final    Creatinine 03/11/2025 0.91  0.57 - 1.00 mg/dL Final    Sodium 03/11/2025 140  136 - 145 mmol/L Final    Potassium 03/11/2025 4.2  3.5 - 5.2 mmol/L Final    Chloride 03/11/2025 104  98 - 107 mmol/L Final    CO2 03/11/2025 24.9  22.0 - 29.0 mmol/L Final    Calcium 03/11/2025 9.5  8.6 - 10.5 mg/dL Final    Total Protein 03/11/2025 6.6  6.0 - 8.5 g/dL Final    Albumin 03/11/2025 4.2  3.5 - 5.2 g/dL Final    ALT (SGPT) 03/11/2025 17  1 - 33 U/L Final    AST (SGOT) 03/11/2025 23  1 - 32 U/L Final    Alkaline Phosphatase 03/11/2025 100  39 - 117 U/L Final    Total Bilirubin 03/11/2025 0.4  0.0 - 1.2 mg/dL Final    Globulin 03/11/2025 2.4  gm/dL Final    A/G Ratio 03/11/2025 1.8  g/dL Final    BUN/Creatinine Ratio 03/11/2025 16.5  7.0 - 25.0 Final    Anion Gap 03/11/2025 11.1  5.0 - 15.0 mmol/L Final    eGFR 03/11/2025 69.3  >60.0 mL/min/1.73 Final    WBC 03/11/2025 2.46 (L)  3.40 - 10.80 10*3/mm3 Final    RBC 03/11/2025 4.28  3.77 - 5.28 10*6/mm3 Final    Hemoglobin 03/11/2025 13.1  12.0 - 15.9 g/dL Final    Hematocrit 03/11/2025 40.4  34.0 - 46.6 % Final    MCV 03/11/2025 94.4  79.0 - 97.0 fL Final    MCH 03/11/2025 30.6  26.6 - 33.0 pg Final    MCHC 03/11/2025 32.4  31.5 - 35.7 g/dL Final    RDW 03/11/2025 14.2  12.3 - 15.4 % Final    RDW-SD 03/11/2025 48.9  37.0 - 54.0 fl Final    MPV 03/11/2025 8.6  6.0 - 12.0 fL Final    Platelets 03/11/2025 142  140 - 450 10*3/mm3 Final    Neutrophil % 03/11/2025 61.0  42.7 - 76.0 % Final    Lymphocyte % 03/11/2025 31.3  19.6 - 45.3 % Final    Monocyte % 03/11/2025 3.7 (L)  5.0 - 12.0 % Final    Eosinophil % 03/11/2025 2.8  0.3 - 6.2 % Final    Basophil % 03/11/2025 0.8  0.0 - 1.5 % Final    Immature Grans % 03/11/2025 0.4  0.0 - 0.5 % Final    Neutrophils,  Absolute 03/11/2025 1.50 (L)  1.70 - 7.00 10*3/mm3 Final    Lymphocytes, Absolute 03/11/2025 0.77  0.70 - 3.10 10*3/mm3 Final    Monocytes, Absolute 03/11/2025 0.09 (L)  0.10 - 0.90 10*3/mm3 Final    Eosinophils, Absolute 03/11/2025 0.07  0.00 - 0.40 10*3/mm3 Final    Basophils, Absolute 03/11/2025 0.02  0.00 - 0.20 10*3/mm3 Final    Immature Grans, Absolute 03/11/2025 0.01  0.00 - 0.05 10*3/mm3 Final    nRBC 03/11/2025 0.0  0.0 - 0.2 /100 WBC Final        No radiology results for the last 30 days.     CT of the chest abdomen and pelvis and bone scan images independently reviewed and interpreted by me in detail summarized above    Assessment & Plan       *Metastatic ER positive breast cancer  Initially presented with neglected breast in 2019 treated with neoadjuvant Adriamycin and Cytoxan followed by Taxol followed by bilateral mastectomies and extra lymph node dissection and adjuvant radiation.  Started on adjuvant Femara after completion of chemotherapy surgery and radiation in 2019  Metastatic recurrence of disease in May 2021 and hence Ribociclib initiated.  Subsequent acute hepatitis from Ribociclib and hence Ribociclib and Femara discontinued  Started Aromasin in March 2022  Metastatic disease recurrence with pericardial mass in January 2024 biopsy to be ER positive CO positive and HER2 0, [facility]us demonstrates ESR 1 mutation  Started back on Ribociclib and continued Aromasin in January 2024  Significant elevation in LFTs after initiation of Ribociclib resulting in discontinuation of all treatment in March 2024  She was also initiated on steroids to help with the hepatitis.  Palliative radiation to the mediastinal mass completed in June 2024  CT of the chest abdomen pelvis from June 2024 shows decrease in size of the mass.  Initiated Faslodex in July 2024  CT of the chest abdomen and pelvis and bone scan 10/21/2024 with stable disease except for new enhancing nodule at the right margin of the pericardial  mass.  2/7/2025-CT of the chest abdomen pelvis with increase in size of internal mammary chain lymph node as well as increase in size of the epicardial nodule suggestive of disease progression.  Patient previously was on Ribociclib which had to be discontinued due to hepatitis and hence she was continued on endocrine therapy alone.  Given that she has never truly been on a CDK 4 6 inhibitor, we could consider continuing the Faslodex along with addition of palbociclib.  2/28/2025 Initiating palbociclib at 100 mg 3 out of 4 weeks  3/19/2025-continues on palbociclib and has 2 more days remaining.  Continues on Faslodex.  Scheduled for Faslodex today.  CBC reviewed and WBC count 1.83, hemoglobin 12.4, platelets 133,000, neutrophil count 820  Since she only has 2 more days of Ribociclib, recommend continuing that.  Recheck CBCOn 3/28/2025 when she is due to initiate the second cycle of palbociclib.    *Previous Ribociclib induced hepatitis  Patient has developed increase in LFTs on both occasions after use of Ribociclib.  This has been permanently discontinued  Patient has also been started on prednisone after the most recent acute hepatitis from Ribociclib  Currently she is on a 5 mg dose of prednisone  Recommend that she decrease this to 2.5 mg for the next week and subsequently discontinue.  LFTs remain normal today  LFTs have remained normal on palbociclib      *Neutropenia  3/19/2025-neutrophil count 820, patient has 2 more days of the cycle 1 remaining of palbociclib.  Recheck on 3/20/2025 when she is due to start the second cycle.  Reinitiate palbociclib at 100 mg daily dose as long as neutrophil count greater than 1000 on 3/28/2025    *Ovarian cyst  Gradual increase in size of the ovarian cyst on the left.  The left ovarian cyst continues to increase in size  Continue to monitor for now     *Atrial fibrillation  Continue follow-up with Dr. Danni Odell with cardiology  Heart rate normal at stable  Continues on  metoprolol.  Continue Eliquis    *Lymphedema of left upper extremity  Continue follow-up with lymphedema clinic    *Hypertension  Blood pressure well-controlled at BP: 122/89     *Vertigo  Intermittent  Somewhat worse more recently  She is currently not using any medications for the same  Could benefit from physical therapy.    *Headaches  Mild and tolerable  Continue Tylenol as needed    PLAN   Continue palbociclib 100 mg daily to be taken for 3 weeks on, 1 week off (she is 19 days and)  Recheck CBC 3/28/2025 and she is due to initiate second cycle of palbociclib  Refer to dermatology for erythematous rash involving her upper extremities and neck  CBC 3/28/2025, MD same day    This patient is on high risk drug therapy requiring intensive monitoring for toxicity.  Neutropenia secondary to palbociclib

## 2025-03-20 ENCOUNTER — SPECIALTY PHARMACY (OUTPATIENT)
Dept: PHARMACY | Facility: HOSPITAL | Age: 65
End: 2025-03-20
Payer: MEDICARE

## 2025-03-20 NOTE — PROGRESS NOTES
Specialty Pharmacy Note: Ibrance (palbociclib)    Marylu Georges is a 65 y.o. female with breast cancer was seen 3/19/25 by Dr. Miguel. Per provider dictation, no changes to oral oncology regimen Ibrance (palbociclib).  Labs Review: The CMP and CBC from 3/19/25 have been reviewed. No dose adjustments are needed for the oral specialty medication(s) based on the labs. Plans to recheck labs 3/28/25 prior to next cycle.     Specialty pharmacy will continue to follow patient.    Gina Lopez, PharmD, BCPS  3/20/2025  10:02 EDT

## 2025-03-24 DIAGNOSIS — C50.811 MALIGNANT NEOPLASM OF OVERLAPPING SITES OF BOTH BREASTS IN FEMALE, ESTROGEN RECEPTOR POSITIVE: ICD-10-CM

## 2025-03-24 DIAGNOSIS — Z17.0 MALIGNANT NEOPLASM OF OVERLAPPING SITES OF BOTH BREASTS IN FEMALE, ESTROGEN RECEPTOR POSITIVE: ICD-10-CM

## 2025-03-24 DIAGNOSIS — C50.812 MALIGNANT NEOPLASM OF OVERLAPPING SITES OF BOTH BREASTS IN FEMALE, ESTROGEN RECEPTOR POSITIVE: ICD-10-CM

## 2025-03-24 DIAGNOSIS — Z17.0 MALIGNANT NEOPLASM OF OVERLAPPING SITES OF LEFT BREAST IN FEMALE, ESTROGEN RECEPTOR POSITIVE: ICD-10-CM

## 2025-03-24 DIAGNOSIS — C50.812 MALIGNANT NEOPLASM OF OVERLAPPING SITES OF LEFT BREAST IN FEMALE, ESTROGEN RECEPTOR POSITIVE: ICD-10-CM

## 2025-03-25 ENCOUNTER — SPECIALTY PHARMACY (OUTPATIENT)
Dept: PHARMACY | Facility: HOSPITAL | Age: 65
End: 2025-03-25
Payer: MEDICARE

## 2025-03-25 NOTE — TELEPHONE ENCOUNTER
Specialty Pharmacy Patient Management Program  Per Protocol Prescription Order or Refill     Patient will be filling or currently fills medications at [facility] and is enrolled in the Patient Management Program.    Requested Prescriptions     Pending Prescriptions Disp Refills    Palbociclib (Ibrance) 100 MG tablet tablet 21 tablet 5     Sig: Take 1 tablet by mouth Daily. Take with or without food on days 1-21, then off for 7 days, in each 28-day period.     Prescription orders above were sent to the pharmacy per Collaborative Care Agreement Protocol.     Daylin McdowellD, [facility]  Clinical Specialty Pharmacist, Oncology  3/25/2025  09:21 EDT

## 2025-03-25 NOTE — TELEPHONE ENCOUNTER
Specialty Pharmacy Patient Management Program  Per Protocol Prescription Order or Refill       Requested Prescriptions     Signed Prescriptions Disp Refills    Palbociclib 100 MG tablet tablet 21 tablet 5     Sig: Take 1 tablet by mouth Daily. Take with or without food on days 1-21, then off for 7 days, in each 28-day period.     Authorizing Provider: JASMYN JOHNSON     Ordering User: CODI WORLEY     Prescription orders above were sent to  [facility]  per Collaborative Care Agreement Protocol.     Completed independent double check on medication order/RX.    Kori River Rp, BCOP  Clinical Specialty Pharmacist, Oncology  3/25/2025  09:32 EDT

## 2025-03-25 NOTE — PROGRESS NOTES
Specialty Pharmacy Patient Management Program       I contacted Marylu this morning to coordinate the delivery of her Ibrance that is due to start on Friday, 3/28/2025. She stated at her last visit she was told that her dose could change based off her neutrophil count. She will be coming in on Friday for labs but she didn't want to coordinate delivery until labs were done on Friday. She is going to pay her copay ($1,991) and didn't want to pay this if her dose was going to change. The earliest delivery that she would be able to get is Tuesday next week with waiting until Friday to schedule it.     Message sent to the team with the above information.    Estelle Emmanuel, Pharmacy Technician  03/25/25  14:10 EDT

## 2025-03-28 ENCOUNTER — SPECIALTY PHARMACY (OUTPATIENT)
Dept: PHARMACY | Facility: HOSPITAL | Age: 65
End: 2025-03-28
Payer: MEDICARE

## 2025-03-28 ENCOUNTER — OFFICE VISIT (OUTPATIENT)
Dept: ONCOLOGY | Facility: CLINIC | Age: 65
End: 2025-03-28
Payer: MEDICARE

## 2025-03-28 ENCOUNTER — LAB (OUTPATIENT)
Dept: LAB | Facility: HOSPITAL | Age: 65
End: 2025-03-28
Payer: MEDICARE

## 2025-03-28 VITALS
RESPIRATION RATE: 17 BRPM | WEIGHT: 175.9 LBS | SYSTOLIC BLOOD PRESSURE: 108 MMHG | OXYGEN SATURATION: 97 % | DIASTOLIC BLOOD PRESSURE: 74 MMHG | BODY MASS INDEX: 29.31 KG/M2 | HEIGHT: 65 IN | TEMPERATURE: 98.6 F | HEART RATE: 76 BPM

## 2025-03-28 DIAGNOSIS — Z17.0 MALIGNANT NEOPLASM OF OVERLAPPING SITES OF LEFT BREAST IN FEMALE, ESTROGEN RECEPTOR POSITIVE: ICD-10-CM

## 2025-03-28 DIAGNOSIS — C50.811 MALIGNANT NEOPLASM OF OVERLAPPING SITES OF BOTH BREASTS IN FEMALE, ESTROGEN RECEPTOR POSITIVE: ICD-10-CM

## 2025-03-28 DIAGNOSIS — C50.811 MALIGNANT NEOPLASM OF OVERLAPPING SITES OF BOTH BREASTS IN FEMALE, ESTROGEN RECEPTOR POSITIVE: Primary | ICD-10-CM

## 2025-03-28 DIAGNOSIS — Z17.0 MALIGNANT NEOPLASM OF OVERLAPPING SITES OF BOTH BREASTS IN FEMALE, ESTROGEN RECEPTOR POSITIVE: ICD-10-CM

## 2025-03-28 DIAGNOSIS — C50.812 MALIGNANT NEOPLASM OF OVERLAPPING SITES OF BOTH BREASTS IN FEMALE, ESTROGEN RECEPTOR POSITIVE: Primary | ICD-10-CM

## 2025-03-28 DIAGNOSIS — C50.812 MALIGNANT NEOPLASM OF OVERLAPPING SITES OF LEFT BREAST IN FEMALE, ESTROGEN RECEPTOR POSITIVE: ICD-10-CM

## 2025-03-28 DIAGNOSIS — Z17.0 MALIGNANT NEOPLASM OF OVERLAPPING SITES OF BOTH BREASTS IN FEMALE, ESTROGEN RECEPTOR POSITIVE: Primary | ICD-10-CM

## 2025-03-28 DIAGNOSIS — C50.812 MALIGNANT NEOPLASM OF OVERLAPPING SITES OF BOTH BREASTS IN FEMALE, ESTROGEN RECEPTOR POSITIVE: ICD-10-CM

## 2025-03-28 LAB
ALBUMIN SERPL-MCNC: 4.3 G/DL (ref 3.5–5.2)
ALBUMIN/GLOB SERPL: 1.8 G/DL
ALP SERPL-CCNC: 88 U/L (ref 39–117)
ALT SERPL W P-5'-P-CCNC: 12 U/L (ref 1–33)
ANION GAP SERPL CALCULATED.3IONS-SCNC: 11.7 MMOL/L (ref 5–15)
AST SERPL-CCNC: 19 U/L (ref 1–32)
BASOPHILS # BLD AUTO: 0.07 10*3/MM3 (ref 0–0.2)
BASOPHILS NFR BLD AUTO: 3.1 % (ref 0–1.5)
BILIRUB SERPL-MCNC: 0.3 MG/DL (ref 0–1.2)
BUN SERPL-MCNC: 18 MG/DL (ref 8–23)
BUN/CREAT SERPL: 15.5 (ref 7–25)
CALCIUM SPEC-SCNC: 9.3 MG/DL (ref 8.6–10.5)
CHLORIDE SERPL-SCNC: 105 MMOL/L (ref 98–107)
CO2 SERPL-SCNC: 26.3 MMOL/L (ref 22–29)
CREAT SERPL-MCNC: 1.16 MG/DL (ref 0.57–1)
DEPRECATED RDW RBC AUTO: 53.7 FL (ref 37–54)
EGFRCR SERPLBLD CKD-EPI 2021: 51.8 ML/MIN/1.73
EOSINOPHIL # BLD AUTO: 0.04 10*3/MM3 (ref 0–0.4)
EOSINOPHIL NFR BLD AUTO: 1.8 % (ref 0.3–6.2)
ERYTHROCYTE [DISTWIDTH] IN BLOOD BY AUTOMATED COUNT: 15.8 % (ref 12.3–15.4)
GLOBULIN UR ELPH-MCNC: 2.4 GM/DL
GLUCOSE SERPL-MCNC: 102 MG/DL (ref 65–99)
HCT VFR BLD AUTO: 38.1 % (ref 34–46.6)
HGB BLD-MCNC: 12.4 G/DL (ref 12–15.9)
IMM GRANULOCYTES # BLD AUTO: 0 10*3/MM3 (ref 0–0.05)
IMM GRANULOCYTES NFR BLD AUTO: 0 % (ref 0–0.5)
LYMPHOCYTES # BLD AUTO: 0.84 10*3/MM3 (ref 0.7–3.1)
LYMPHOCYTES NFR BLD AUTO: 37.2 % (ref 19.6–45.3)
MCH RBC QN AUTO: 31.2 PG (ref 26.6–33)
MCHC RBC AUTO-ENTMCNC: 32.5 G/DL (ref 31.5–35.7)
MCV RBC AUTO: 95.7 FL (ref 79–97)
MONOCYTES # BLD AUTO: 0.28 10*3/MM3 (ref 0.1–0.9)
MONOCYTES NFR BLD AUTO: 12.4 % (ref 5–12)
NEUTROPHILS NFR BLD AUTO: 1.03 10*3/MM3 (ref 1.7–7)
NEUTROPHILS NFR BLD AUTO: 45.5 % (ref 42.7–76)
NRBC BLD AUTO-RTO: 0 /100 WBC (ref 0–0.2)
PLATELET # BLD AUTO: 165 10*3/MM3 (ref 140–450)
PMV BLD AUTO: 8.5 FL (ref 6–12)
POTASSIUM SERPL-SCNC: 4.3 MMOL/L (ref 3.5–5.2)
PROT SERPL-MCNC: 6.7 G/DL (ref 6–8.5)
RBC # BLD AUTO: 3.98 10*6/MM3 (ref 3.77–5.28)
SODIUM SERPL-SCNC: 143 MMOL/L (ref 136–145)
WBC NRBC COR # BLD AUTO: 2.26 10*3/MM3 (ref 3.4–10.8)

## 2025-03-28 PROCEDURE — 36415 COLL VENOUS BLD VENIPUNCTURE: CPT

## 2025-03-28 PROCEDURE — 85025 COMPLETE CBC W/AUTO DIFF WBC: CPT

## 2025-03-28 PROCEDURE — 80053 COMPREHEN METABOLIC PANEL: CPT

## 2025-03-28 NOTE — PROGRESS NOTES
Subjective   Marylu Georges is a 65 y.o. female.  With metastatic ER positive breast cancer.    History of Present Illness     Patient is a postmenopausal 65-year-old  lady who initially presented with a neglected left breast with axillary lymphadenopathy which was measuring up to 9 cm.  The tumor was fixed at the time of presentation.  She was treated with neoadjuvant Adriamycin and Cytoxan followed by Taxol in 2019 and subsequently underwent adjuvant radiation to the left chest wall and axilla.  Placed on adjuvant Femara.      PET/CT on 5/19/2021 which showed 2.6 x 1 cm enlarged lymph node in the right costophrenic fat pad which was new.  SUV 6.8.  Findings consistent with localized metastatic disease.  No other foci of pathological hypermetabolic some identified in the chest.  7 x 4.8 cm unilocular cyst in the left adnexa most likely arises from the ovary.  Slight increase in size since previous scan when it measured 5.5 cm.  The cyst is photopenic supporting a benign etiology.    Patient was initiated on Ribociclib in May 2021 and Femara continued.  September 2021 it was noted that LFTs were elevated and Ribociclib and Femara were placed on hold.  CT chest performed in November 2021 showed resolution of the cardiophrenic angle lymph node.    Resumed Femara only in November 2021.    Patient was seen again 12/29/2021 and had persistent elevation of LFTs although not any worse compared to November 2021.  In fact LFTs had improved with an ALT of 326 and AST of 167.  Due to persistent elevation of the LFTs Femara was discontinued.    1/25/2022-improvement in LFTs with ALT of 90 and AST of 55.  Patient also had liver biopsy which was showing hepatitis.    3/7/2022-patient was initiated on Aromasin 25 mg daily.    In December 2023 patient complained of intermittent chest pain.  This was further evaluated with a CT angiogram of the chest performed on 12/29/2023.  There was an irregular heterogeneous anterior  pericardial mass which extends into the anterior chest wall measuring 6.5 cm transversely and on coronal series 6.5 x 5.2 cm.  There is thickening of the pericardium along the base of the mass and adjacent to the mass as well.  Appearance is suspicious for metastatic disease versus primary malignancy.  Enlarged nodes at both epicardial fat pads largest measuring 1.4 x 0.9 cm.  No evidence of pulmonary embolism.    1/23/2024-PET/CT with enlarging intensely avid anterior pericardial soft tissue mass representing patient's known malignancy.  Adjacent cardiophrenic soft tissue nodules and lymph nodes which are also larger when compared to September 2022 scan.  This raises concern for metastatic disease.  Scattered bilateral subcentimeter pulmonary nodules.  Focal uptake within the pubic symphysis with a focus of osteolysis and apparent mild widening increased from September 2022.  Metastatic disease would be atypical and underlying synovitis or infection cannot be excluded.    Adnexal cyst has increased in size since September 2022 and now measures 6.2 x 9.5 cm.    1/15/2024 biopsy of the mediastinal mass was consistent with metastatic breast cancer.  ER +81 to 90% strong  DE +2% weak  HER2 negative, score 0    [facility] NGS with ESR 1 mutation.    Ribociclib was resumed in January 2024 and LFTs monitored closely.    In March 2024 LFTs started to spike up and subsequently Ribociclib as well as Aromasin was discontinued.     She completed radiation to the metastatic deposit in front of the pericardium in June 2024.    Initiated Faslodex in July 2024.    6/26/2024-CT of the chest abdomen and pelvis  Slight decrease in size of the anterior pericardial mass  Small right pelvic cyst appears stable when compared to 9/7/2022.  Larger left pelvic cyst measures 10.5 x 6.4 cm which has increased in size since September 2022.  Further evaluation is suggested.  If conservative management is elected then short-term follow-up with CT  pelvis recommended.  Status post bilateral mastectomies and bilateral axillary node dissection.      10/21/2024-CT of the chest abdomen and pelvis-decrease in size of the anterior pericardial mass measuring 5.5 x 1.2 cm, previously 5.6 x 1.6 cm.  New enhancing nodule along the right margin measuring 0.7 x 0.6 cm.  No mediastinal or hilar lymphadenopathy.  New bandlike opacity in the lateral aspect of the right apex no suspicious nodularity but focus reevaluation is recommended on subsequent CT chest with contrast.  Stable sub-6 mm left lower lobe pulmonary nodule.  10 x 6.2 cm left adnexal mass.  Stable 3.3 cm right ovarian cyst.    10/21/2024-no evidence of metastatic disease on the bone scan.    2/7/2025-CT of the chest abdomen and pelvis-new right internal mammary chain lymphadenopathy measuring 2 x 1 cm in greatest dimension.  No hilar lymphadenopathy.  Right epicardial fat pad along the pericardium is larger now measuring 1.9 x 1.4 cm, previously 7 mm.  No evidence of metastatic disease in the abdomen  Left adnexal cystic mass not significantly changed measuring 11 x 6.3 cm right adnexal cystic mass measuring 3.8 x 2.9 cm.  Previously 3.3 x 2.5 cm.    2/7/2025-bone scan without any evidence of metastatic disease.        Interval History  Kelsie presents today for follow-up.  She is scheduled to start cycle 3 of palbociclib   Faslodex is due in 2 weeks.  She is currently on 100 mg of Ibrance.  Denies any nausea or vomiting.  Overall tolerating it well.  3/28/2025-CBC reviewed and WBC 2.26, hemoglobin 12.4, platelets 165,000, absolute neutrophil count 1.03  CMP with a creatinine of 1.16 otherwise within normal limits      The following portions of the patient's history were reviewed and updated as appropriate: allergies, current medications, past family history, past medical history, past social history, past surgical history, and problem list.    Past Medical History:   Diagnosis Date    Anemia in neoplastic  disease     Arthritis     Arthritis of back     Arthritis of neck     Breast cancer     Left    CVA (cerebral vascular accident)     Encounter for long-term (current) use of other medications 06/22/2021    Fracture, fibula     Fracture, finger     Frozen shoulder     Hip arthrosis 01/17    Hip pain     RIGHT HIP... CYST    History of fracture of leg 1987    History of radiation therapy     LAST TREATMENT      Hypertension     Knee swelling     Limited joint range of motion (ROM)     RIGHT HIP    Low back strain     Periarthritis of shoulder     Rotator cuff syndrome 6/22    Skin sore     OPEN SORE LEFT BREAST    Syncope     Vertigo         Past Surgical History:   Procedure Laterality Date    AXILLARY LYMPH NODE BIOPSY/EXCISION Right     LYMPH NODE UNDER RIGHT ARM-MALIGNANT (DOUBLE MASTECTOMY)    BREAST BIOPSY Left     MALIGNANT    FRACTURE SURGERY  1987    Leg    HARDWARE REMOVAL      INCISION AND DRAINAGE LEG Left 06/30/2022    Procedure: INCISION AND DRAINAGE left ankle;  Surgeon: Kenneth Tran MD;  Location: [facility];  Service: Orthopedics;  Laterality: Left;    JOINT REPLACEMENT Bilateral mar 2020,july 2021    hips    MASTECTOMY W/ SENTINEL NODE BIOPSY Bilateral 09/16/2019    Procedure: BILATERLA MODIFIED RADICAL MASTECTOMY WITH BILATERAL SENTINEL LYMPH NODE BIOPSY;  Surgeon: Joby Barron Jr., MD;  Location: [facility];  Service: General    TOTAL HIP ARTHROPLASTY Right 03/02/2020    Procedure: RIGHT TOTAL HIP ARTHROPLASTY NATALY NAVIGATION;  Surgeon: Luis M Leonard MD;  Location: [facility] OR;  Service: Orthopedics;  Laterality: Right;    TOTAL HIP ARTHROPLASTY Left 07/20/2021    Procedure: Posterior LEFT TOTAL HIP ARTHROPLASTY NATALY NAVIGATION;  Surgeon: Luis M Leonard MD;  Location: [facility] OR;  Service: Orthopedics;  Laterality: Left;    VENOUS ACCESS DEVICE (PORT) INSERTION Right 02/01/2019    Procedure: INSERTION VENOUS ACCESS DEVICE;  Surgeon: Joby Barron Jr., MD;   Location:  [facility];  Service: General    VENOUS ACCESS DEVICE (PORT) REMOVAL N/A 10/30/2019    Procedure: Mediport Removal;  Surgeon: Joby Barron Jr., MD;  Location: [facility] MAIN OR;  Service: General        Family History   Problem Relation Age of Onset    Lung cancer Father     Irritable bowel syndrome Father     Cancer Mother     Breast cancer Mother     Liver disease Mother     Breast cancer Sister 48    Breast cancer Maternal Grandmother     Breast cancer Paternal Grandmother     Breast cancer Maternal Uncle     Malig Hyperthermia Neg Hx         Social History     Socioeconomic History    Marital status:      Spouse name: Cruz    Number of children: 0   Tobacco Use    Smoking status: Never    Smokeless tobacco: Never   Vaping Use    Vaping status: Never Used   Substance and Sexual Activity    Alcohol use: Yes     Comment: occasional    Drug use: No    Sexual activity: Not Currently     Partners: Male     Birth control/protection: Abstinence        OB History          0    Para   0    Term   0       0    AB   0    Living   0         SAB   0    IAB   0    Ectopic   0    Molar   0    Multiple   0    Live Births   0                 Allergies   Allergen Reactions    Benadryl [Diphenhydramine] Itching     INCREASES BLOOD PRESSURE    Erythromycin GI Intolerance    Levaquin [Levofloxacin] GI Intolerance    Penicillins GI Intolerance      Review of Systems  Review of systems mentioned HPI otherwise negative    Objective   not currently breastfeeding.   Limited due to video visit  Physical Exam  Vitals reviewed.   Constitutional:       Appearance: Normal appearance. She is normal weight.   HENT:      Nose: Nose normal.   Eyes:      Conjunctiva/sclera: Conjunctivae normal.   Cardiovascular:      Rate and Rhythm: Normal rate.   Pulmonary:      Effort: Pulmonary effort is normal.   Musculoskeletal:      Cervical back: Normal range of motion.   Skin:     General: Skin is warm.      Findings:  Rash (dry/raised on upper arms (stable)) present.   Neurological:      General: No focal deficit present.      Mental Status: She is alert.   Psychiatric:         Mood and Affect: Mood normal.         Behavior: Behavior normal.         Thought Content: Thought content normal.         Judgment: Judgment normal.       I have reexamined the patient and the results are consistent with the previously documented exam except as updated. Kayley Miguel MD      Lab on 03/28/2025   Component Date Value Ref Range Status    Glucose 03/28/2025 102 (H)  65 - 99 mg/dL Final    BUN 03/28/2025 18  8 - 23 mg/dL Final    Creatinine 03/28/2025 1.16 (H)  0.57 - 1.00 mg/dL Final    Sodium 03/28/2025 143  136 - 145 mmol/L Final    Potassium 03/28/2025 4.3  3.5 - 5.2 mmol/L Final    Chloride 03/28/2025 105  98 - 107 mmol/L Final    CO2 03/28/2025 26.3  22.0 - 29.0 mmol/L Final    Calcium 03/28/2025 9.3  8.6 - 10.5 mg/dL Final    Total Protein 03/28/2025 6.7  6.0 - 8.5 g/dL Final    Albumin 03/28/2025 4.3  3.5 - 5.2 g/dL Final    ALT (SGPT) 03/28/2025 12  1 - 33 U/L Final    AST (SGOT) 03/28/2025 19  1 - 32 U/L Final    Alkaline Phosphatase 03/28/2025 88  39 - 117 U/L Final    Total Bilirubin 03/28/2025 0.3  0.0 - 1.2 mg/dL Final    Globulin 03/28/2025 2.4  gm/dL Final    A/G Ratio 03/28/2025 1.8  g/dL Final    BUN/Creatinine Ratio 03/28/2025 15.5  7.0 - 25.0 Final    Anion Gap 03/28/2025 11.7  5.0 - 15.0 mmol/L Final    eGFR 03/28/2025 51.8 (L)  >60.0 mL/min/1.73 Final    WBC 03/28/2025 2.26 (L)  3.40 - 10.80 10*3/mm3 Final    RBC 03/28/2025 3.98  3.77 - 5.28 10*6/mm3 Final    Hemoglobin 03/28/2025 12.4  12.0 - 15.9 g/dL Final    Hematocrit 03/28/2025 38.1  34.0 - 46.6 % Final    MCV 03/28/2025 95.7  79.0 - 97.0 fL Final    MCH 03/28/2025 31.2  26.6 - 33.0 pg Final    MCHC 03/28/2025 32.5  31.5 - 35.7 g/dL Final    RDW 03/28/2025 15.8 (H)  12.3 - 15.4 % Final    RDW-SD 03/28/2025 53.7  37.0 - 54.0 fl Final    MPV 03/28/2025 8.5  6.0 -  12.0 fL Final    Platelets 03/28/2025 165  140 - 450 10*3/mm3 Final    Neutrophil % 03/28/2025 45.5  42.7 - 76.0 % Final    Lymphocyte % 03/28/2025 37.2  19.6 - 45.3 % Final    Monocyte % 03/28/2025 12.4 (H)  5.0 - 12.0 % Final    Eosinophil % 03/28/2025 1.8  0.3 - 6.2 % Final    Basophil % 03/28/2025 3.1 (H)  0.0 - 1.5 % Final    Immature Grans % 03/28/2025 0.0  0.0 - 0.5 % Final    Neutrophils, Absolute 03/28/2025 1.03 (L)  1.70 - 7.00 10*3/mm3 Final    Lymphocytes, Absolute 03/28/2025 0.84  0.70 - 3.10 10*3/mm3 Final    Monocytes, Absolute 03/28/2025 0.28  0.10 - 0.90 10*3/mm3 Final    Eosinophils, Absolute 03/28/2025 0.04  0.00 - 0.40 10*3/mm3 Final    Basophils, Absolute 03/28/2025 0.07  0.00 - 0.20 10*3/mm3 Final    Immature Grans, Absolute 03/28/2025 0.00  0.00 - 0.05 10*3/mm3 Final    nRBC 03/28/2025 0.0  0.0 - 0.2 /100 WBC Final   Lab on 03/19/2025   Component Date Value Ref Range Status    Glucose 03/19/2025 95  65 - 99 mg/dL Final    BUN 03/19/2025 17  8 - 23 mg/dL Final    Creatinine 03/19/2025 0.86  0.57 - 1.00 mg/dL Final    Sodium 03/19/2025 139  136 - 145 mmol/L Final    Potassium 03/19/2025 4.5  3.5 - 5.2 mmol/L Final    Chloride 03/19/2025 106  98 - 107 mmol/L Final    CO2 03/19/2025 25.0  22.0 - 29.0 mmol/L Final    Calcium 03/19/2025 9.4  8.6 - 10.5 mg/dL Final    Total Protein 03/19/2025 6.8  6.0 - 8.5 g/dL Final    Albumin 03/19/2025 4.4  3.5 - 5.2 g/dL Final    ALT (SGPT) 03/19/2025 15  1 - 33 U/L Final    AST (SGOT) 03/19/2025 20  1 - 32 U/L Final    Alkaline Phosphatase 03/19/2025 97  39 - 117 U/L Final    Total Bilirubin 03/19/2025 0.4  0.0 - 1.2 mg/dL Final    Globulin 03/19/2025 2.4  gm/dL Final    A/G Ratio 03/19/2025 1.8  g/dL Final    BUN/Creatinine Ratio 03/19/2025 19.8  7.0 - 25.0 Final    Anion Gap 03/19/2025 8.0  5.0 - 15.0 mmol/L Final    eGFR 03/19/2025 74.2  >60.0 mL/min/1.73 Final    WBC 03/19/2025 1.83 (L)  3.40 - 10.80 10*3/mm3 Final    RBC 03/19/2025 4.04  3.77 - 5.28  10*6/mm3 Final    Hemoglobin 03/19/2025 12.4  12.0 - 15.9 g/dL Final    Hematocrit 03/19/2025 38.2  34.0 - 46.6 % Final    MCV 03/19/2025 94.6  79.0 - 97.0 fL Final    MCH 03/19/2025 30.7  26.6 - 33.0 pg Final    MCHC 03/19/2025 32.5  31.5 - 35.7 g/dL Final    RDW 03/19/2025 14.6  12.3 - 15.4 % Final    RDW-SD 03/19/2025 49.5  37.0 - 54.0 fl Final    MPV 03/19/2025 8.8  6.0 - 12.0 fL Final    Platelets 03/19/2025 133 (L)  140 - 450 10*3/mm3 Final    Neutrophil % 03/19/2025 44.9  42.7 - 76.0 % Final    Lymphocyte % 03/19/2025 42.6  19.6 - 45.3 % Final    Monocyte % 03/19/2025 8.7  5.0 - 12.0 % Final    Eosinophil % 03/19/2025 2.2  0.3 - 6.2 % Final    Basophil % 03/19/2025 1.1  0.0 - 1.5 % Final    Immature Grans % 03/19/2025 0.5  0.0 - 0.5 % Final    Neutrophils, Absolute 03/19/2025 0.82 (L)  1.70 - 7.00 10*3/mm3 Final    Lymphocytes, Absolute 03/19/2025 0.78  0.70 - 3.10 10*3/mm3 Final    Monocytes, Absolute 03/19/2025 0.16  0.10 - 0.90 10*3/mm3 Final    Eosinophils, Absolute 03/19/2025 0.04  0.00 - 0.40 10*3/mm3 Final    Basophils, Absolute 03/19/2025 0.02  0.00 - 0.20 10*3/mm3 Final    Immature Grans, Absolute 03/19/2025 0.01  0.00 - 0.05 10*3/mm3 Final    nRBC 03/19/2025 0.0  0.0 - 0.2 /100 WBC Final   Lab on 03/11/2025   Component Date Value Ref Range Status    Glucose 03/11/2025 105 (H)  65 - 99 mg/dL Final    BUN 03/11/2025 15  8 - 23 mg/dL Final    Creatinine 03/11/2025 0.91  0.57 - 1.00 mg/dL Final    Sodium 03/11/2025 140  136 - 145 mmol/L Final    Potassium 03/11/2025 4.2  3.5 - 5.2 mmol/L Final    Chloride 03/11/2025 104  98 - 107 mmol/L Final    CO2 03/11/2025 24.9  22.0 - 29.0 mmol/L Final    Calcium 03/11/2025 9.5  8.6 - 10.5 mg/dL Final    Total Protein 03/11/2025 6.6  6.0 - 8.5 g/dL Final    Albumin 03/11/2025 4.2  3.5 - 5.2 g/dL Final    ALT (SGPT) 03/11/2025 17  1 - 33 U/L Final    AST (SGOT) 03/11/2025 23  1 - 32 U/L Final    Alkaline Phosphatase 03/11/2025 100  39 - 117 U/L Final    Total  Bilirubin 03/11/2025 0.4  0.0 - 1.2 mg/dL Final    Globulin 03/11/2025 2.4  gm/dL Final    A/G Ratio 03/11/2025 1.8  g/dL Final    BUN/Creatinine Ratio 03/11/2025 16.5  7.0 - 25.0 Final    Anion Gap 03/11/2025 11.1  5.0 - 15.0 mmol/L Final    eGFR 03/11/2025 69.3  >60.0 mL/min/1.73 Final    WBC 03/11/2025 2.46 (L)  3.40 - 10.80 10*3/mm3 Final    RBC 03/11/2025 4.28  3.77 - 5.28 10*6/mm3 Final    Hemoglobin 03/11/2025 13.1  12.0 - 15.9 g/dL Final    Hematocrit 03/11/2025 40.4  34.0 - 46.6 % Final    MCV 03/11/2025 94.4  79.0 - 97.0 fL Final    MCH 03/11/2025 30.6  26.6 - 33.0 pg Final    MCHC 03/11/2025 32.4  31.5 - 35.7 g/dL Final    RDW 03/11/2025 14.2  12.3 - 15.4 % Final    RDW-SD 03/11/2025 48.9  37.0 - 54.0 fl Final    MPV 03/11/2025 8.6  6.0 - 12.0 fL Final    Platelets 03/11/2025 142  140 - 450 10*3/mm3 Final    Neutrophil % 03/11/2025 61.0  42.7 - 76.0 % Final    Lymphocyte % 03/11/2025 31.3  19.6 - 45.3 % Final    Monocyte % 03/11/2025 3.7 (L)  5.0 - 12.0 % Final    Eosinophil % 03/11/2025 2.8  0.3 - 6.2 % Final    Basophil % 03/11/2025 0.8  0.0 - 1.5 % Final    Immature Grans % 03/11/2025 0.4  0.0 - 0.5 % Final    Neutrophils, Absolute 03/11/2025 1.50 (L)  1.70 - 7.00 10*3/mm3 Final    Lymphocytes, Absolute 03/11/2025 0.77  0.70 - 3.10 10*3/mm3 Final    Monocytes, Absolute 03/11/2025 0.09 (L)  0.10 - 0.90 10*3/mm3 Final    Eosinophils, Absolute 03/11/2025 0.07  0.00 - 0.40 10*3/mm3 Final    Basophils, Absolute 03/11/2025 0.02  0.00 - 0.20 10*3/mm3 Final    Immature Grans, Absolute 03/11/2025 0.01  0.00 - 0.05 10*3/mm3 Final    nRBC 03/11/2025 0.0  0.0 - 0.2 /100 WBC Final        No radiology results for the last 30 days.     CT of the chest abdomen and pelvis and bone scan images independently reviewed and interpreted by me in detail summarized above    Assessment & Plan       *Metastatic ER positive breast cancer  Initially presented with neglected breast in 2019 treated with neoadjuvant Adriamycin and  Cytoxan followed by Taxol followed by bilateral mastectomies and extra lymph node dissection and adjuvant radiation.  Started on adjuvant Femara after completion of chemotherapy surgery and radiation in 2019  Metastatic recurrence of disease in May 2021 and hence Ribociclib initiated.  Subsequent acute hepatitis from Ribociclib and hence Ribociclib and Femara discontinued  Started Aromasin in March 2022  Metastatic disease recurrence with pericardial mass in January 2024 biopsy to be ER positive OR positive and HER2 0, [facility] demonstrates ESR 1 mutation  Started back on Ribociclib and continued Aromasin in January 2024  Significant elevation in LFTs after initiation of Ribociclib resulting in discontinuation of all treatment in March 2024  She was also initiated on steroids to help with the hepatitis.  Palliative radiation to the mediastinal mass completed in June 2024  CT of the chest abdomen pelvis from June 2024 shows decrease in size of the mass.  Initiated Faslodex in July 2024  CT of the chest abdomen and pelvis and bone scan 10/21/2024 with stable disease except for new enhancing nodule at the right margin of the pericardial mass.  2/7/2025-CT of the chest abdomen pelvis with increase in size of internal mammary chain lymph node as well as increase in size of the epicardial nodule suggestive of disease progression.  Patient previously was on Ribociclib which had to be discontinued due to hepatitis and hence she was continued on endocrine therapy alone.  Given that she has never truly been on a CDK 4 6 inhibitor, we could consider continuing the Faslodex along with addition of palbociclib.  2/28/2025 Initiating palbociclib at 100 mg 3 out of 4 weeks  3/19/2025-continues on palbociclib and has 2 more days remaining.  Continues on Faslodex.  Scheduled for Faslodex today.  CBC reviewed and WBC count 1.83, hemoglobin 12.4, platelets 133,000, neutrophil count 820  Since she only has 2 more days of ibrance recommend  continuing that.  3/28/2025-due for cycle 3 of Ibrance.  Labs reviewed and overall stable to proceed with 100 mg of Ibrance 3 out of 4 weeks.  Neutrophil count right at 1000.  We will recheck labs again in 4 weeks and if there is significant neutropenia below 1000 then plan to decrease the dose to 75 mg daily.    *Previous Ribociclib induced hepatitis  Patient has developed increase in LFTs on both occasions after use of Ribociclib.  This has been permanently discontinued  Patient has also been started on prednisone after the most recent acute hepatitis from Ribociclib  Currently she is on a 5 mg dose of prednisone  Recommend that she decrease this to 2.5 mg for the next week and subsequently discontinue.  LFTs remain normal today  LFTs continue to remain normal on palbociclib      *Neutropenia  3/19/2025-neutrophil count 820, patient has 2 more days of the cycle 1 remaining of palbociclib.  Recheck on 3/20/2025 when she is due to start the second cycle.  Neutrophil count 1000 on 3/28/2025    *Ovarian cyst  Gradual increase in size of the ovarian cyst on the left.  The left ovarian cyst continues to increase in size  Continue to monitor for now     *Atrial fibrillation  Continue follow-up with Dr. Danni Odell with cardiology  Heart rate normal at stable  Continues on metoprolol.  Continue Eliquis    *Lymphedema of left upper extremity  Continue follow-up with lymphedema clinic    *Hypertension  Blood pressure well-controlled at BP: 108/74     *Vertigo  Intermittent  Somewhat worse more recently  She is currently not using any medications for the same  Could benefit from physical therapy.    *Headaches  Mild and tolerable  Continue Tylenol as needed    PLAN   Continue palbociclib 100 mg daily to be taken for 3 weeks on, 1 week off , cycle 3 3/28/2025  Faslodex in 2 weeks  CBC CMP and cycle 4 of palbociclib in 4 weeks, MD or APRN in 4 weeks    This patient is on high risk drug therapy requiring intensive monitoring for  toxicity.  Neutropenia secondary to palbociclib

## 2025-03-28 NOTE — PROGRESS NOTES
Specialty Pharmacy Patient Management Program  Oncology / Hematology Refill Outreach      Marylu was contacted today regarding refills of her Ibrance specialty medication(s).    Specialty medication(s) and dose(s) confirmed: Yes  Ibrance 100 mg daily for 21 days on then 7 days off.    Refill Questions      Flowsheet Row Most Recent Value   Changes to allergies? No   Changes to medications? No   New conditions or infections since last clinic visit No   Unplanned office visit, urgent care, ED, or hospital admission in the last 4 weeks  No   How does patient/caregiver feel medication is working? Good   Financial problems or insurance changes  No   Since the previous refill, were any specialty medication doses or scheduled injections missed or delayed?  No   If yes, please provide the amount Her cycle will be starting a few days late-should have started 3/28/2025. She will get her delivery on 4/1/2025   Does this patient require a clinical escalation to a pharmacist? No          Delivery Questions      Flowsheet Row Most Recent Value   Delivery method [employer]   Delivery address verified with patient/caregiver? Yes  [Ship to home address]   Delivery address Home  [Ship to home address-Ship 3/31 for delivery 4/1-$1991.16 copay with [id number] Confirmed]   Number of medications in delivery 1   Medication(s) being filled and delivered Palbociclib  [100 mg]   Doses left of specialty medications 0-In off week. She will restart her cycle on 4/1/2025   Copay verified? Yes   Copay amount $1991.16 copay with insurance-[employer]   Copay form of payment Credit/debit on file   Delivery Date Selection 04/01/25   Signature Required No   Do you consent to receive electronic handouts?  Yes          Ibrance delivery coordinated with pt for 4/1/2025 to her home address. $1991.16 copay with insurance-[employer]. Her last delivery was on 2/28/2025. No questions or concerns to report to MTM Team today. She reports no missed doses since last  delivery. PDC is 81%.    Follow-Up: 21 Days    Estelle Emmanuel, Pharmacy Technician  3/28/2025  12:24 EDT

## 2025-03-31 ENCOUNTER — SPECIALTY PHARMACY (OUTPATIENT)
Dept: PHARMACY | Facility: HOSPITAL | Age: 65
End: 2025-03-31
Payer: MEDICARE

## 2025-03-31 NOTE — PROGRESS NOTES
Specialty Pharmacy Note: Ibrance (palbociclib)    Marylu Georges is a 65 y.o. female with breast cancer was seen 3/28/25 by Dr. Miguel. Per provider dictation, no changes to oral oncology regimen Ibrance (palbociclib).  Labs Review: The CMP and CBC from 3/28/25 have been reviewed. No dose adjustments are needed for the oral specialty medication(s) based on the labs.    Specialty pharmacy will continue to follow patient.    Gina Lopez, PharmD, BCPS  3/31/2025  09:48 EDT

## 2025-04-17 ENCOUNTER — OFFICE VISIT (OUTPATIENT)
Dept: CARDIOLOGY | Age: 65
End: 2025-04-17
Payer: MEDICARE

## 2025-04-17 VITALS
DIASTOLIC BLOOD PRESSURE: 84 MMHG | HEART RATE: 79 BPM | HEIGHT: 65 IN | WEIGHT: 179 LBS | BODY MASS INDEX: 29.82 KG/M2 | SYSTOLIC BLOOD PRESSURE: 112 MMHG

## 2025-04-17 DIAGNOSIS — R79.89 ELEVATED LFTS: ICD-10-CM

## 2025-04-17 DIAGNOSIS — I10 PRIMARY HYPERTENSION: ICD-10-CM

## 2025-04-17 DIAGNOSIS — R94.31 ABNORMAL EKG: ICD-10-CM

## 2025-04-17 DIAGNOSIS — Z17.0 MALIGNANT NEOPLASM OF OVERLAPPING SITES OF LEFT BREAST IN FEMALE, ESTROGEN RECEPTOR POSITIVE: ICD-10-CM

## 2025-04-17 DIAGNOSIS — I27.20 PULMONARY HYPERTENSION: ICD-10-CM

## 2025-04-17 DIAGNOSIS — I51.89 CARDIAC MASS: ICD-10-CM

## 2025-04-17 DIAGNOSIS — M00.9 SEPTIC ARTHRITIS, DUE TO UNSPECIFIED ORGANISM, SEPTIC ARTHRITIS OF UNSPECIFIED LOCATION: ICD-10-CM

## 2025-04-17 DIAGNOSIS — C50.812 MALIGNANT NEOPLASM OF OVERLAPPING SITES OF LEFT BREAST IN FEMALE, ESTROGEN RECEPTOR POSITIVE: ICD-10-CM

## 2025-04-17 DIAGNOSIS — I48.19 PERSISTENT ATRIAL FIBRILLATION: Primary | ICD-10-CM

## 2025-04-17 RX ORDER — METOPROLOL SUCCINATE 50 MG/1
TABLET, EXTENDED RELEASE ORAL
Qty: 270 TABLET | Refills: 2 | Status: SHIPPED | OUTPATIENT
Start: 2025-04-17 | End: 2025-04-17 | Stop reason: SDUPTHER

## 2025-04-17 RX ORDER — METOPROLOL SUCCINATE 50 MG/1
TABLET, EXTENDED RELEASE ORAL
Qty: 270 TABLET | Refills: 2 | Status: SHIPPED | OUTPATIENT
Start: 2025-04-17

## 2025-04-17 RX ORDER — LAMOTRIGINE 25 MG/1
TABLET ORAL
COMMUNITY

## 2025-04-17 RX ORDER — HYDROXYZINE HYDROCHLORIDE 25 MG/1
25 TABLET, FILM COATED ORAL
COMMUNITY
Start: 2025-04-16

## 2025-04-17 RX ORDER — TRIAMCINOLONE ACETONIDE 1 MG/G
1 OINTMENT TOPICAL 2 TIMES DAILY
COMMUNITY
Start: 2025-04-16

## 2025-04-17 NOTE — PROGRESS NOTES
Date of Office Visit: 2025  Encounter Provider: ISAIAS De Dios  Place of Service: [facility] CARDIOLOGY  Patient Name: Marylu Georges  :1960    Chief complaint  Cardio oncology care, cardiac mass, atrial fibrillation, pulmonary hypertension, prolonged QTc     History of Present Illness  Patient is a 65 y.o. year old female  patient of Dr. Odell. Past medical history includes  hypertension, prior stroke, left-sided breast cancer.  She had a large mass that was neglected for a period of time and became quite gigantic and ulcerated.  By 2019 she was diagnosed with breast cancer.  She had significant improvement with AC therapy in 2019 (total dose 243 mg/m² of Adriamycin) and this was followed by Taxol therapy which is completed on 2019. She subsequently underwent bilateral mastectomy 2019 with axillary node dissection.  She also underwent radiation therapy completed on 2020 and has been on adjuvant Femara since 2019.  She was able to achieve remission with this.  She then presented on 2021 showing some activity on a PET scan in the left side chest wall.  It was appearing to abut the lower aspect of her heart.  Kisquali was initiated.  She was started on magnesium with concerns of QTC prolongation that can be associated with this agent.  On 2021 tumor marker CA 15-3 had dropped and there was resolution lymphadenopathy was noted by CT imaging.  Liver function tests were elevated and Kisquali  and then Femara were discontinued being seen by GI.     In 2019 she had an echocardiogram in the setting of syncope echocardiogram that showed an ejection fraction of 60% with moderately dilated left atrium and negative saline injection.  I do not see any strain measurements.  Stress perfusion study was negative for ischemia.  Monitor showed few episodes of atrial tachycardia that were quite brief.  There is no residual malignancy,  She then received radiation  therapy to bilateral chest 10/19-1/2020.  With findings of mass near the pericardium follow-up echocardiogram was performed on 6/2021 that showed an ejection fraction 57% with GLS -19.9% with normal wall thickness trivial valve regurgitation no pericardial mass was appreciated.  And 6/2022 she was admitted with infectious arthritis discitis and myositis.  She was treated with antibiotics.  While in hospital she developed new onset atrial fibrillation with rapid rates.  Echo showed an ejection fraction of 55% with indeterminate diastolic function small patent foramen ovale with mild pulmonary hypertension with an RV systolic pressure 41 mmHg.  She was placed on Eliquis and metoprolol.  By 9/2022 she underwent synchronized cardioversion to sinus rhythm by Dr Gustafson.  She was seen in follow-up by EP service and while [id number] score is 1 with a history of carcinoma it was felt that her stroke risk was higher and Eliquis was continued.  Echo on 10/2023 showed an ejection fraction 61%, GLS -21.1% LV was mildly dilated with grade 1 diastolic dysfunction, atrial septum was redundant the injection was not performed there was aortic valve thickening without stenosis there is mild valve regurgitation with an RVSP of 39 mmHg.  Overall stable with slight increase in pulmonary pressures.  On July 2023 with mild normal EKG changes stress perfusion study was negative for ischemia.  She had recurrent atrial fibrillation for which he underwent successful electrical cardioversion in October 2023.  In the meanwhile she has been continued on Aromasin.  On 12/2023 she complained of chest discomfort and a CT angiogram of the chest showed anterior pericardial mass extending around the anterior chest with thickening of the pericardium at the base of the mass.  Surgical or radiation related therapy were not felt to be adequate. Kisqali was resumed.  She developed elevated transaminases that resolved after chemotherapy was stopped and  started on steroids.  She started palliative therapy on 5/2021.  She was dehydrated with nausea.  IV fluids were given on 6/4/2024.  In the midst of this she had recurrent atrial fibrillation controlled.  CT scan of the chest abdomen and pelvis anterior pericardial mass was present and slightly smaller.  Patient to study from 4/2024.  Liver was unremarkable.  Large left adnexal cyst increased in size currently.  On May and June 2024 she patient was seen in the emergency room with nausea and headac was seen in the emergency room twice for hypertensive urgency.  On 7/2024 she was started on Faslodex  With plans to consider Ibrance at a later date.  Liver function test again slightly elevated.  Of note ovarian cyst has been noted to slowly increase in size.  This is being monitored by CT imaging. In February 2025, with progression of disease, she started Ibrance.      Last echo 1/2024 with ejection fraction 54% negative saline injection.  On my review of prior images there was no obvious.  Intra or extracardiac mass appreciated.  CT chest 6/2024 showed a decrease in the anterior pericardial mass from 4/2024, right pelvic cyst stable, left large pelvic cyst larger from 9/2022.     History  Patient presents today for routine follow-up.  I will visit with her today and have reviewed her medical record.  Since last visit she has overall been doing well.  She was started on Ibrance in February 2025 after scans showed progression of disease.  QTc has been stable (437 ms today).  She denies palpitations, edema, chest pain or chest pressure, fatigue, syncope or presyncope.  She is still bothered by vertigo symptoms but this does not significantly limit her daily activities.  She has chronic dyspnea on exertion that is unchanged.  Blood pressure today is well-controlled and she reports similar readings at home.    Past Medical History:   Diagnosis Date    Anemia in neoplastic disease     Arthritis     Arthritis of back      Arthritis of neck     Breast cancer     Left    CVA (cerebral vascular accident)     Encounter for long-term (current) use of other medications 06/22/2021    Fracture, fibula     Fracture, finger     Frozen shoulder     Hip arthrosis 01/17    Hip pain     RIGHT HIP... CYST    History of fracture of leg 1987    History of radiation therapy     LAST TREATMENT      Hypertension     Knee swelling     Limited joint range of motion (ROM)     RIGHT HIP    Low back strain     Periarthritis of shoulder     Rotator cuff syndrome 6/22    Skin sore     OPEN SORE LEFT BREAST    Syncope     Vertigo      Past Surgical History:   Procedure Laterality Date    AXILLARY LYMPH NODE BIOPSY/EXCISION Right     LYMPH NODE UNDER RIGHT ARM-MALIGNANT (DOUBLE MASTECTOMY)    BREAST BIOPSY Left     MALIGNANT    FRACTURE SURGERY  1987    Leg    HARDWARE REMOVAL      INCISION AND DRAINAGE LEG Left 06/30/2022    Procedure: INCISION AND DRAINAGE left ankle;  Surgeon: Kenneth Tran MD;  Location: [facility];  Service: Orthopedics;  Laterality: Left;    JOINT REPLACEMENT Bilateral mar 2020,july 2021    hips    MASTECTOMY W/ SENTINEL NODE BIOPSY Bilateral 09/16/2019    Procedure: BILATERLA MODIFIED RADICAL MASTECTOMY WITH BILATERAL SENTINEL LYMPH NODE BIOPSY;  Surgeon: Joby Barron Jr., MD;  Location: [facility] OR;  Service: General    TOTAL HIP ARTHROPLASTY Right 03/02/2020    Procedure: RIGHT TOTAL HIP ARTHROPLASTY NATALY NAVIGATION;  Surgeon: Luis M Leonard MD;  Location: [facility] OR;  Service: Orthopedics;  Laterality: Right;    TOTAL HIP ARTHROPLASTY Left 07/20/2021    Procedure: Posterior LEFT TOTAL HIP ARTHROPLASTY NATALY NAVIGATION;  Surgeon: Luis M Leonard MD;  Location: [facility] OR;  Service: Orthopedics;  Laterality: Left;    VENOUS ACCESS DEVICE (PORT) INSERTION Right 02/01/2019    Procedure: INSERTION VENOUS ACCESS DEVICE;  Surgeon: Joby Barron Jr., MD;  Location: [facility];  Service: General     VENOUS ACCESS DEVICE (PORT) REMOVAL N/A 10/30/2019    Procedure: Mediport Removal;  Surgeon: Joby Barron Jr., MD;  Location: [facility] OR;  Service: General     Outpatient Medications Prior to Visit   Medication Sig Dispense Refill    hydrOXYzine (ATARAX) 25 MG tablet Take 1 tablet by mouth.      triamcinolone (KENALOG) 0.1 % ointment Apply 1 Application topically to the appropriate area as directed 2 (Two) Times a Day.      Fulvestrant (FASLODEX) 250 MG/5ML chemo syringe Inject  into the appropriate muscle as directed by prescriber.      ondansetron ODT (ZOFRAN-ODT) 8 MG disintegrating tablet Take 1 tablet by mouth 3 (Three) Times a Day As Needed for Nausea or Vomiting. 30 tablet 5    Palbociclib 100 MG tablet tablet Take 1 tablet by mouth Daily. Take with or without food on days 1-21, then off for 7 days, in each 28-day period. 21 tablet 5    prochlorperazine (COMPAZINE) 10 MG tablet Take 1 tablet by mouth Every 6 (Six) Hours As Needed for Nausea or Vomiting. 10 tablet 0    apixaban (Eliquis) 5 MG tablet tablet TAKE ONE TABLET BY MOUTH EVERY 12 HOURS 60 tablet 5    metoprolol succinate XL (TOPROL-XL) 50 MG 24 hr tablet TAKE 2 TABLETS BY MOUTH EVERY MORNING AND TAKE ONE TABLET BY MOUTH EVERY EVENING 270 tablet 0     Facility-Administered Medications Prior to Visit   Medication Dose Route Frequency Provider Last Rate Last Admin    Chlorhexidine Gluconate Cloth 2 % pads 1 each  1 each Apply externally Take As Directed Luis M Leonard MD           Allergies as of 04/17/2025 - Reviewed 04/17/2025   Allergen Reaction Noted    Diphenhydramine Itching 02/18/2020    Erythromycin GI Intolerance 01/17/2019    Levofloxacin GI Intolerance and Nausea Only 03/07/2019    Penicillins GI Intolerance 01/17/2019     Social History     Socioeconomic History    Marital status:      Spouse name: Cruz    Number of children: 0   Tobacco Use    Smoking status: Never    Smokeless tobacco: Never   Vaping Use    Vaping status:  "Never Used   Substance and Sexual Activity    Alcohol use: Never     Comment: occasional    Drug use: No    Sexual activity: Not Currently     Partners: Male     Birth control/protection: Abstinence     Family History   Problem Relation Age of Onset    Lung cancer Father     Irritable bowel syndrome Father     Cancer Mother     Breast cancer Mother     Liver disease Mother     Breast cancer Sister 48    Breast cancer Maternal Grandmother     Breast cancer Paternal Grandmother     Breast cancer Maternal Uncle     Malig Hyperthermia Neg Hx      Review of Systems   Constitutional: Negative for malaise/fatigue.   Cardiovascular:  Positive for dyspnea on exertion. Negative for chest pain, claudication, leg swelling, near-syncope, orthopnea, palpitations, paroxysmal nocturnal dyspnea and syncope.   Respiratory:  Negative for shortness of breath.    Neurological:  Positive for vertigo. Negative for brief paralysis, dizziness, headaches and light-headedness.   All other systems reviewed and are negative.       Objective:     Vitals:    04/17/25 0739   BP: 112/84   BP Location: Left arm   Patient Position: Sitting   Cuff Size: Small Adult   Pulse: 79   Weight: 81.2 kg (179 lb)   Height: 165.1 cm (65\")     Body mass index is 29.79 kg/m².    Vitals reviewed.   Constitutional:       General: Not in acute distress.     Appearance: Well-developed and not in distress. Not diaphoretic.   HENT:      Head: Normocephalic.   Pulmonary:      Effort: Pulmonary effort is normal. No respiratory distress.      Breath sounds: Normal breath sounds. No wheezing. No rhonchi. No rales.   Cardiovascular:      Normal rate. Irregularly irregular rhythm.      Murmurs: There is no murmur.   Pulses:     Radial: 2+ bilaterally.  Edema:     Peripheral edema absent.   Skin:     General: Skin is warm and dry. There is no cyanosis.      Findings: No rash.   Neurological:      Mental Status: Alert and oriented to person, place, and time.   Psychiatric:    "      Behavior: Behavior normal. Behavior is cooperative.         Thought Content: Thought content normal.         Judgment: Judgment normal.       Lab Review:     Lab Results   Component Value Date     03/28/2025     03/19/2025    K 4.3 03/28/2025    K 4.5 03/19/2025     03/28/2025     03/19/2025    CO2 26.3 03/28/2025    CO2 25.0 03/19/2025    BUN 18 03/28/2025    BUN 17 03/19/2025    CREATININE 1.16 (H) 03/28/2025    CREATININE 0.86 03/19/2025    EGFRIFNONA 63 01/25/2022    EGFRIFNONA 61 12/29/2021    GLUCOSE 102 (H) 03/28/2025    GLUCOSE 95 03/19/2025    CALCIUM 9.3 03/28/2025    CALCIUM 9.4 03/19/2025    ALBUMIN 4.3 03/28/2025    ALBUMIN 4.4 03/19/2025    BILITOT 0.3 03/28/2025    BILITOT 0.4 03/19/2025    AST 19 03/28/2025    AST 20 03/19/2025    ALT 12 03/28/2025    ALT 15 03/19/2025     Lab Results   Component Value Date    WBC 2.26 (L) 03/28/2025    WBC 1.83 (L) 03/19/2025    HGB 12.4 03/28/2025    HGB 12.4 03/19/2025    HCT 38.1 03/28/2025    HCT 38.2 03/19/2025    MCV 95.7 03/28/2025    MCV 94.6 03/19/2025     03/28/2025     (L) 03/19/2025     Lab Results   Component Value Date    PROBNP 2,048.0 (H) 06/27/2022    PROBNP 16.2 07/30/2019     Lab Results   Component Value Date    TROPONINT <6 06/25/2024     Lab Results   Component Value Date    TSH 1.820 06/27/2022    TSH 1.370 07/30/2019             ECG 12 Lead    Date/Time: 4/17/2025 9:06 AM  Performed by: Pamela Shah APRN    Authorized by: Pamela Shah APRN  Comparison: compared with previous ECG   Similar to previous ECG  Rhythm: atrial fibrillation  Rate: normal  BPM: 79  QRS axis: normal  Other findings: non-specific ST-T wave changes  Comments: Similar to prior        Assessment:       Diagnosis Plan   1. Persistent atrial fibrillation        2. Cardiac mass        3. Malignant neoplasm of overlapping sites of left breast in female, estrogen receptor positive        4. Primary hypertension        5.  Abnormal EKG        6. Pulmonary hypertension        7. Elevated LFTs        8. Septic arthritis, due to unspecified organism, septic arthritis of unspecified location          Plan:       1.  Large anterior pericardial mass, presumably metastatic disease.  On chemo radiation therapy.  This seems to have improved on radiation therapy and Faslodex started in July.  Now also on Ibrance.  QTc normal on EKG today.  Not evident on echo in January 2024.  CT scan in February 2025 showed increase in nodular density in epicardial fat pad.  Clinically doing well.  2.  Hypertension with recent ER visit for hypertensive urgency.  Well-controlled on current regimen.  She will continue to monitor this and watch for hypotension with summer months.  3.  Abnormal EKG with ST-T wave changes.  Stress perfusion scan negative for ischemia in July 2023.  Labile changes are present again today. Echo 1/2024 unremarkable  4.  Persistent atrial fibrillation, status post electrical cardioversion to sinus rhythm x 2.  Now back in atrial fibrillation with a controlled rate. Tolerating metoprolol and Eliquis.  Refilled prescription today.  5.  Metastatic breast cancer.  Has had chemoradiation therapy and now with progression of disease.  On Faslodex and Ibrance.  QTc normal on EKG today.  6.  Pulmonary hypertension.  Slightly elevated in the past with RVSP of 39 mmHg and resolved on echo in January 2024.  7.  Elevated liver function tests.  Previously attributed to chemotherapy.  Most recent LFTs were normal.  8.  Renal insufficiency.  Noted on most recent labs.  Mild.  Encouraged her to increase hydration and avoid NSAIDs.  8.  History of arthritis, septic.  Treated with antibiotics IV.    Faslodex cardiovascular monitoring for patients with breast cancer: Comprehensive assessment prior to treatment including a history and physical examination, screening for cardiovascular disease risk factors such as hypertension, diabetes, dyslipidemia,  obesity, and smoking; estimate 10-year cardiovascular disease risk in patients without cardiovascular disease at baseline; assess cardiovascular risk annually in patients with a high 10-year risk (ASCO [Maori 2017]; ESC [Carroll 2022])     Palbociclib cardiovascular monitoring: Comprehensive assessment prior to treatment including a history and physical examination, screening for cardiovascular disease risk factors such as hypertension, diabetes, dyslipidemia, obesity, and smoking (ASCO [Maori 2017]). Consider baseline and periodic QTc monitoring in patients with QTc above the normal range or other conditions that may prolong the QTc interval (ESC [Carroll 2022]).     Time Spent: I spent 40 minutes caring for Marylu on this date of service. This time includes time spent by me in the following activities: preparing for the visit, reviewing tests, performing a medically appropriate examination and/or evaluations, counseling and educating the patient/family/caregiver, ordering medications, tests, or procedures, documenting information in the medical record, and independently interpreting results and communicating that information with the patient/family/caregiver.   I spent 1 minutes on the separately reported service of ECG. This time is not included in the time used to support the E/M service also reported today.        Your medication list            Accurate as of April 17, 2025  9:22 AM. If you have any questions, ask your nurse or doctor.                CHANGE how you take these medications        Instructions Last Dose Given Next Dose Due   apixaban 5 MG tablet tablet  Commonly known as: Eliquis  What changed: when to take this  Changed by: Pamela Shah      Take 1 tablet by mouth Every 12 (Twelve) Hours.              CONTINUE taking these medications        Instructions Last Dose Given Next Dose Due   Fulvestrant 250 MG/5ML chemo syringe  Commonly known as: FASLODEX      Inject  into the appropriate muscle as  directed by prescriber.       hydrOXYzine 25 MG tablet  Commonly known as: ATARAX      Take 1 tablet by mouth.       Ibrance 100 MG tablet tablet  Generic drug: Palbociclib      Take 1 tablet by mouth Daily. Take with or without food on days 1-21, then off for 7 days, in each 28-day period.       metoprolol succinate XL 50 MG 24 hr tablet  Commonly known as: TOPROL-XL      TAKE 2 TABLETS BY MOUTH EVERY MORNING AND TAKE ONE TABLET BY MOUTH EVERY EVENING       ondansetron ODT 8 MG disintegrating tablet  Commonly known as: ZOFRAN-ODT      Take 1 tablet by mouth 3 (Three) Times a Day As Needed for Nausea or Vomiting.       prochlorperazine 10 MG tablet  Commonly known as: COMPAZINE      Take 1 tablet by mouth Every 6 (Six) Hours As Needed for Nausea or Vomiting.       triamcinolone 0.1 % ointment  Commonly known as: KENALOG      Apply 1 Application topically to the appropriate area as directed 2 (Two) Times a Day.                 Where to Get Your Medications        These medications were sent to [facility] [id number] - [place] [id number] AT [address] [place] [address] [phone] [facility] [phone] [facility] [place] [address]      Phone: [phone]   apixaban 5 MG tablet tablet  metoprolol succinate XL 50 MG 24 hr tablet         Patient is no longer taking -.  I corrected the med list to reflect this.  I did not stop these medications.    Return in about 6 months (around 10/17/2025) for with Dr. Odell.      Dictated utilizing Dragon dictation

## 2025-04-18 ENCOUNTER — LAB (OUTPATIENT)
Dept: LAB | Facility: HOSPITAL | Age: 65
End: 2025-04-18
Payer: MEDICARE

## 2025-04-18 ENCOUNTER — INFUSION (OUTPATIENT)
Dept: ONCOLOGY | Facility: HOSPITAL | Age: 65
End: 2025-04-18
Payer: MEDICARE

## 2025-04-18 DIAGNOSIS — Z17.0 MALIGNANT NEOPLASM OF OVERLAPPING SITES OF BOTH BREASTS IN FEMALE, ESTROGEN RECEPTOR POSITIVE: ICD-10-CM

## 2025-04-18 DIAGNOSIS — L27.0 DRUG RASH: ICD-10-CM

## 2025-04-18 DIAGNOSIS — C50.811 MALIGNANT NEOPLASM OF OVERLAPPING SITES OF BOTH BREASTS IN FEMALE, ESTROGEN RECEPTOR POSITIVE: ICD-10-CM

## 2025-04-18 DIAGNOSIS — Z17.0 MALIGNANT NEOPLASM OF OVERLAPPING SITES OF LEFT BREAST IN FEMALE, ESTROGEN RECEPTOR POSITIVE: ICD-10-CM

## 2025-04-18 DIAGNOSIS — C50.812 MALIGNANT NEOPLASM OF OVERLAPPING SITES OF LEFT BREAST IN FEMALE, ESTROGEN RECEPTOR POSITIVE: Primary | ICD-10-CM

## 2025-04-18 DIAGNOSIS — Z17.0 MALIGNANT NEOPLASM OF OVERLAPPING SITES OF LEFT BREAST IN FEMALE, ESTROGEN RECEPTOR POSITIVE: Primary | ICD-10-CM

## 2025-04-18 DIAGNOSIS — C50.812 MALIGNANT NEOPLASM OF OVERLAPPING SITES OF LEFT BREAST IN FEMALE, ESTROGEN RECEPTOR POSITIVE: ICD-10-CM

## 2025-04-18 DIAGNOSIS — C50.812 MALIGNANT NEOPLASM OF OVERLAPPING SITES OF BOTH BREASTS IN FEMALE, ESTROGEN RECEPTOR POSITIVE: ICD-10-CM

## 2025-04-18 LAB
ALBUMIN SERPL-MCNC: 4.3 G/DL (ref 3.5–5.2)
ALBUMIN/GLOB SERPL: 1.9 G/DL
ALP SERPL-CCNC: 93 U/L (ref 39–117)
ALT SERPL W P-5'-P-CCNC: 16 U/L (ref 1–33)
ANION GAP SERPL CALCULATED.3IONS-SCNC: 10.4 MMOL/L (ref 5–15)
AST SERPL-CCNC: 23 U/L (ref 1–32)
BASOPHILS # BLD AUTO: 0.04 10*3/MM3 (ref 0–0.2)
BASOPHILS NFR BLD AUTO: 1.6 % (ref 0–1.5)
BILIRUB SERPL-MCNC: 0.4 MG/DL (ref 0–1.2)
BUN SERPL-MCNC: 15 MG/DL (ref 8–23)
BUN/CREAT SERPL: 17.2 (ref 7–25)
CALCIUM SPEC-SCNC: 9.6 MG/DL (ref 8.6–10.5)
CANCER AG15-3 SERPL-ACNC: 20.6 U/ML
CHLORIDE SERPL-SCNC: 104 MMOL/L (ref 98–107)
CO2 SERPL-SCNC: 24.6 MMOL/L (ref 22–29)
CREAT SERPL-MCNC: 0.87 MG/DL (ref 0.57–1)
DEPRECATED RDW RBC AUTO: 58 FL (ref 37–54)
EGFRCR SERPLBLD CKD-EPI 2021: 73.1 ML/MIN/1.73
EOSINOPHIL # BLD AUTO: 0.06 10*3/MM3 (ref 0–0.4)
EOSINOPHIL NFR BLD AUTO: 2.4 % (ref 0.3–6.2)
ERYTHROCYTE [DISTWIDTH] IN BLOOD BY AUTOMATED COUNT: 16 % (ref 12.3–15.4)
GLOBULIN UR ELPH-MCNC: 2.3 GM/DL
GLUCOSE SERPL-MCNC: 102 MG/DL (ref 65–99)
HCT VFR BLD AUTO: 39.3 % (ref 34–46.6)
HGB BLD-MCNC: 12.8 G/DL (ref 12–15.9)
IMM GRANULOCYTES # BLD AUTO: 0.01 10*3/MM3 (ref 0–0.05)
IMM GRANULOCYTES NFR BLD AUTO: 0.4 % (ref 0–0.5)
LYMPHOCYTES # BLD AUTO: 0.87 10*3/MM3 (ref 0.7–3.1)
LYMPHOCYTES NFR BLD AUTO: 34.7 % (ref 19.6–45.3)
MCH RBC QN AUTO: 32.1 PG (ref 26.6–33)
MCHC RBC AUTO-ENTMCNC: 32.6 G/DL (ref 31.5–35.7)
MCV RBC AUTO: 98.5 FL (ref 79–97)
MONOCYTES # BLD AUTO: 0.22 10*3/MM3 (ref 0.1–0.9)
MONOCYTES NFR BLD AUTO: 8.8 % (ref 5–12)
NEUTROPHILS NFR BLD AUTO: 1.31 10*3/MM3 (ref 1.7–7)
NEUTROPHILS NFR BLD AUTO: 52.1 % (ref 42.7–76)
NRBC BLD AUTO-RTO: 0 /100 WBC (ref 0–0.2)
PLATELET # BLD AUTO: 143 10*3/MM3 (ref 140–450)
PMV BLD AUTO: 8.6 FL (ref 6–12)
POTASSIUM SERPL-SCNC: 4.8 MMOL/L (ref 3.5–5.2)
PROT SERPL-MCNC: 6.6 G/DL (ref 6–8.5)
RBC # BLD AUTO: 3.99 10*6/MM3 (ref 3.77–5.28)
SODIUM SERPL-SCNC: 139 MMOL/L (ref 136–145)
WBC NRBC COR # BLD AUTO: 2.51 10*3/MM3 (ref 3.4–10.8)

## 2025-04-18 PROCEDURE — 80053 COMPREHEN METABOLIC PANEL: CPT

## 2025-04-18 PROCEDURE — 36415 COLL VENOUS BLD VENIPUNCTURE: CPT

## 2025-04-18 PROCEDURE — 25010000002 FULVESTRANT PER 25 MG: Performed by: NURSE PRACTITIONER

## 2025-04-18 PROCEDURE — 96402 CHEMO HORMON ANTINEOPL SQ/IM: CPT

## 2025-04-18 PROCEDURE — 85025 COMPLETE CBC W/AUTO DIFF WBC: CPT

## 2025-04-18 PROCEDURE — 86300 IMMUNOASSAY TUMOR CA 15-3: CPT | Performed by: INTERNAL MEDICINE

## 2025-04-18 RX ORDER — LAMOTRIGINE 25 MG/1
500 TABLET ORAL ONCE
Status: CANCELLED | OUTPATIENT
Start: 2025-04-18

## 2025-04-18 RX ORDER — LAMOTRIGINE 25 MG/1
500 TABLET ORAL ONCE
Status: COMPLETED | OUTPATIENT
Start: 2025-04-18 | End: 2025-04-18

## 2025-04-18 RX ADMIN — FULVESTRANT 500 MG: 50 INJECTION INTRAMUSCULAR at 08:55

## 2025-04-19 LAB — CANCER AG27-29 SERPL-ACNC: 23.8 U/ML (ref 0–38.6)

## 2025-04-22 ENCOUNTER — SPECIALTY PHARMACY (OUTPATIENT)
Dept: PHARMACY | Facility: HOSPITAL | Age: 65
End: 2025-04-22
Payer: MEDICARE

## 2025-04-22 NOTE — PROGRESS NOTES
" Specialty Pharmacy Patient Management Program  Oncology / Hematology Refill Outreach      Marylu was contacted today regarding refills of her Ibrance specialty medication(s).    Specialty medication(s) and dose(s) confirmed: Yes  Ibrance 100 mg daily for 21 days on then 7 days off. Next cycle starts 4/30/2025.    Refill Questions      Flowsheet Row Most Recent Value   Changes to allergies? No   Changes to medications? Yes  [Pt seen by [facility]-She was given hydroxyzine and triamcinolone for \"skin issues\"]   New conditions or infections since last clinic visit Yes   If yes, please describe  Pt reports having issues with her skin   Unplanned office visit, urgent care, ED, or hospital admission in the last 4 weeks  No   How does patient/caregiver feel medication is working? Good   Financial problems or insurance changes  No   Since the previous refill, were any specialty medication doses or scheduled injections missed or delayed?  No   If yes, please provide the amount 0   Does this patient require a clinical escalation to a pharmacist? Yes          Delivery Questions      Flowsheet Row Most Recent Value   Delivery method [employer]   Delivery address verified with patient/caregiver? Yes  [Ship to home address]   Delivery address Home  [Ship to home address-Ship 4/23 for delivery 4/24-$0 copay with insurance covering 100%-Address Confirmed]   Number of medications in delivery 1   Medication(s) being filled and delivered Palbociclib  [100 mg]   Doses left of specialty medications 1 day then she will start her off week. Next cycle starts 4/30/2025   Copay verified? Yes   Copay amount $0 copay with insurance covering 100%-deductible met for 2025   Copay form of payment No copayment ($0)   Delivery Date Selection 04/24/25   Signature Required No   Do you consent to receive electronic handouts?  No          Ibrance delivery coordinated with pt for 4/24/2025 to her home address. $0 copay with insurance covering 100%. Her " "last delivery was on 4/1/2025. No questions or concerns to report to MT Team today. She reports no missed doses since last delivery. PDC is 82%. She reported today that she has \"skin issues\" and went to a [place] [facility] who prescribed hydroxyzine and triamcinolone. [facility] Pharmacist notified.    Follow-Up: 21 Days    Estelle Emmanuel, Pharmacy Technician  4/22/2025  13:14 EDT    "

## 2025-04-23 ENCOUNTER — SPECIALTY PHARMACY (OUTPATIENT)
Dept: PHARMACY | Facility: HOSPITAL | Age: 65
End: 2025-04-23
Payer: MEDICARE

## 2025-05-02 ENCOUNTER — OFFICE VISIT (OUTPATIENT)
Dept: ONCOLOGY | Facility: CLINIC | Age: 65
End: 2025-05-02
Payer: MEDICARE

## 2025-05-02 ENCOUNTER — LAB (OUTPATIENT)
Dept: LAB | Facility: HOSPITAL | Age: 65
End: 2025-05-02
Payer: MEDICARE

## 2025-05-02 VITALS
BODY MASS INDEX: 29.85 KG/M2 | DIASTOLIC BLOOD PRESSURE: 72 MMHG | WEIGHT: 179.2 LBS | OXYGEN SATURATION: 100 % | HEART RATE: 71 BPM | RESPIRATION RATE: 17 BRPM | HEIGHT: 65 IN | TEMPERATURE: 98 F | SYSTOLIC BLOOD PRESSURE: 103 MMHG

## 2025-05-02 DIAGNOSIS — C50.812 MALIGNANT NEOPLASM OF OVERLAPPING SITES OF BOTH BREASTS IN FEMALE, ESTROGEN RECEPTOR POSITIVE: Primary | ICD-10-CM

## 2025-05-02 DIAGNOSIS — C50.812 MALIGNANT NEOPLASM OF OVERLAPPING SITES OF LEFT BREAST IN FEMALE, ESTROGEN RECEPTOR POSITIVE: ICD-10-CM

## 2025-05-02 DIAGNOSIS — Z17.0 MALIGNANT NEOPLASM OF OVERLAPPING SITES OF LEFT BREAST IN FEMALE, ESTROGEN RECEPTOR POSITIVE: ICD-10-CM

## 2025-05-02 DIAGNOSIS — Z17.0 MALIGNANT NEOPLASM OF OVERLAPPING SITES OF BOTH BREASTS IN FEMALE, ESTROGEN RECEPTOR POSITIVE: Primary | ICD-10-CM

## 2025-05-02 DIAGNOSIS — Z17.0 MALIGNANT NEOPLASM OF OVERLAPPING SITES OF BOTH BREASTS IN FEMALE, ESTROGEN RECEPTOR POSITIVE: ICD-10-CM

## 2025-05-02 DIAGNOSIS — C50.811 MALIGNANT NEOPLASM OF OVERLAPPING SITES OF BOTH BREASTS IN FEMALE, ESTROGEN RECEPTOR POSITIVE: Primary | ICD-10-CM

## 2025-05-02 DIAGNOSIS — C50.812 MALIGNANT NEOPLASM OF OVERLAPPING SITES OF BOTH BREASTS IN FEMALE, ESTROGEN RECEPTOR POSITIVE: ICD-10-CM

## 2025-05-02 DIAGNOSIS — C50.811 MALIGNANT NEOPLASM OF OVERLAPPING SITES OF BOTH BREASTS IN FEMALE, ESTROGEN RECEPTOR POSITIVE: ICD-10-CM

## 2025-05-02 LAB
ALBUMIN SERPL-MCNC: 4.5 G/DL (ref 3.5–5.2)
ALBUMIN/GLOB SERPL: 2.3 G/DL
ALP SERPL-CCNC: 81 U/L (ref 39–117)
ALT SERPL W P-5'-P-CCNC: 16 U/L (ref 1–33)
ANION GAP SERPL CALCULATED.3IONS-SCNC: 11 MMOL/L (ref 5–15)
AST SERPL-CCNC: 26 U/L (ref 1–32)
BASOPHILS # BLD AUTO: 0.08 10*3/MM3 (ref 0–0.2)
BASOPHILS NFR BLD AUTO: 2.9 % (ref 0–1.5)
BILIRUB SERPL-MCNC: 0.4 MG/DL (ref 0–1.2)
BUN SERPL-MCNC: 10 MG/DL (ref 8–23)
BUN/CREAT SERPL: 10.8 (ref 7–25)
CALCIUM SPEC-SCNC: 9.4 MG/DL (ref 8.6–10.5)
CHLORIDE SERPL-SCNC: 108 MMOL/L (ref 98–107)
CO2 SERPL-SCNC: 24 MMOL/L (ref 22–29)
CREAT SERPL-MCNC: 0.93 MG/DL (ref 0.57–1)
DEPRECATED RDW RBC AUTO: 63.4 FL (ref 37–54)
EGFRCR SERPLBLD CKD-EPI 2021: 67.5 ML/MIN/1.73
EOSINOPHIL # BLD AUTO: 0.05 10*3/MM3 (ref 0–0.4)
EOSINOPHIL NFR BLD AUTO: 1.8 % (ref 0.3–6.2)
ERYTHROCYTE [DISTWIDTH] IN BLOOD BY AUTOMATED COUNT: 17.2 % (ref 12.3–15.4)
GLOBULIN UR ELPH-MCNC: 2 GM/DL
GLUCOSE SERPL-MCNC: 100 MG/DL (ref 65–99)
HCT VFR BLD AUTO: 35.7 % (ref 34–46.6)
HGB BLD-MCNC: 11.3 G/DL (ref 12–15.9)
IMM GRANULOCYTES # BLD AUTO: 0 10*3/MM3 (ref 0–0.05)
IMM GRANULOCYTES NFR BLD AUTO: 0 % (ref 0–0.5)
LYMPHOCYTES # BLD AUTO: 0.97 10*3/MM3 (ref 0.7–3.1)
LYMPHOCYTES NFR BLD AUTO: 35.3 % (ref 19.6–45.3)
MCH RBC QN AUTO: 31.9 PG (ref 26.6–33)
MCHC RBC AUTO-ENTMCNC: 31.7 G/DL (ref 31.5–35.7)
MCV RBC AUTO: 100.8 FL (ref 79–97)
MONOCYTES # BLD AUTO: 0.41 10*3/MM3 (ref 0.1–0.9)
MONOCYTES NFR BLD AUTO: 14.9 % (ref 5–12)
NEUTROPHILS NFR BLD AUTO: 1.24 10*3/MM3 (ref 1.7–7)
NEUTROPHILS NFR BLD AUTO: 45.1 % (ref 42.7–76)
NRBC BLD AUTO-RTO: 0 /100 WBC (ref 0–0.2)
PLATELET # BLD AUTO: 177 10*3/MM3 (ref 140–450)
PMV BLD AUTO: 8.6 FL (ref 6–12)
POTASSIUM SERPL-SCNC: 4.7 MMOL/L (ref 3.5–5.2)
PROT SERPL-MCNC: 6.5 G/DL (ref 6–8.5)
RBC # BLD AUTO: 3.54 10*6/MM3 (ref 3.77–5.28)
SODIUM SERPL-SCNC: 143 MMOL/L (ref 136–145)
WBC NRBC COR # BLD AUTO: 2.75 10*3/MM3 (ref 3.4–10.8)

## 2025-05-02 PROCEDURE — 80053 COMPREHEN METABOLIC PANEL: CPT

## 2025-05-02 PROCEDURE — 36415 COLL VENOUS BLD VENIPUNCTURE: CPT

## 2025-05-02 PROCEDURE — 85025 COMPLETE CBC W/AUTO DIFF WBC: CPT

## 2025-05-02 NOTE — PROGRESS NOTES
Subjective   Marylu Georges is a 65 y.o. female.  With metastatic ER positive breast cancer.    History of Present Illness     Patient is a postmenopausal 65-year-old  lady who initially presented with a neglected left breast with axillary lymphadenopathy which was measuring up to 9 cm.  The tumor was fixed at the time of presentation.  She was treated with neoadjuvant Adriamycin and Cytoxan followed by Taxol in 2019 and subsequently underwent adjuvant radiation to the left chest wall and axilla.  Placed on adjuvant Femara.      PET/CT on 5/19/2021 which showed 2.6 x 1 cm enlarged lymph node in the right costophrenic fat pad which was new.  SUV 6.8.  Findings consistent with localized metastatic disease.  No other foci of pathological hypermetabolic some identified in the chest.  7 x 4.8 cm unilocular cyst in the left adnexa most likely arises from the ovary.  Slight increase in size since previous scan when it measured 5.5 cm.  The cyst is photopenic supporting a benign etiology.    Patient was initiated on Ribociclib in May 2021 and Femara continued.  September 2021 it was noted that LFTs were elevated and Ribociclib and Femara were placed on hold.  CT chest performed in November 2021 showed resolution of the cardiophrenic angle lymph node.    Resumed Femara only in November 2021.    Patient was seen again 12/29/2021 and had persistent elevation of LFTs although not any worse compared to November 2021.  In fact LFTs had improved with an ALT of 326 and AST of 167.  Due to persistent elevation of the LFTs Femara was discontinued.    1/25/2022-improvement in LFTs with ALT of 90 and AST of 55.  Patient also had liver biopsy which was showing hepatitis.    3/7/2022-patient was initiated on Aromasin 25 mg daily.    In December 2023 patient complained of intermittent chest pain.  This was further evaluated with a CT angiogram of the chest performed on 12/29/2023.  There was an irregular heterogeneous anterior  pericardial mass which extends into the anterior chest wall measuring 6.5 cm transversely and on coronal series 6.5 x 5.2 cm.  There is thickening of the pericardium along the base of the mass and adjacent to the mass as well.  Appearance is suspicious for metastatic disease versus primary malignancy.  Enlarged nodes at both epicardial fat pads largest measuring 1.4 x 0.9 cm.  No evidence of pulmonary embolism.    1/23/2024-PET/CT with enlarging intensely avid anterior pericardial soft tissue mass representing patient's known malignancy.  Adjacent cardiophrenic soft tissue nodules and lymph nodes which are also larger when compared to September 2022 scan.  This raises concern for metastatic disease.  Scattered bilateral subcentimeter pulmonary nodules.  Focal uptake within the pubic symphysis with a focus of osteolysis and apparent mild widening increased from September 2022.  Metastatic disease would be atypical and underlying synovitis or infection cannot be excluded.    Adnexal cyst has increased in size since September 2022 and now measures 6.2 x 9.5 cm.    1/15/2024 biopsy of the mediastinal mass was consistent with metastatic breast cancer.  ER +81 to 90% strong  ND +2% weak  HER2 negative, score 0    Tempus NGS with ESR 1 mutation.    Ribociclib was resumed in January 2024 and LFTs monitored closely.    In March 2024 LFTs started to spike up and subsequently Ribociclib as well as Aromasin was discontinued.     She completed radiation to the metastatic deposit in front of the pericardium in June 2024.    Initiated Faslodex in July 2024.    6/26/2024-CT of the chest abdomen and pelvis  Slight decrease in size of the anterior pericardial mass  Small right pelvic cyst appears stable when compared to 9/7/2022.  Larger left pelvic cyst measures 10.5 x 6.4 cm which has increased in size since September 2022.  Further evaluation is suggested.  If conservative management is elected then short-term follow-up with CT  pelvis recommended.  Status post bilateral mastectomies and bilateral axillary node dissection.      10/21/2024-CT of the chest abdomen and pelvis-decrease in size of the anterior pericardial mass measuring 5.5 x 1.2 cm, previously 5.6 x 1.6 cm.  New enhancing nodule along the right margin measuring 0.7 x 0.6 cm.  No mediastinal or hilar lymphadenopathy.  New bandlike opacity in the lateral aspect of the right apex no suspicious nodularity but focus reevaluation is recommended on subsequent CT chest with contrast.  Stable sub-6 mm left lower lobe pulmonary nodule.  10 x 6.2 cm left adnexal mass.  Stable 3.3 cm right ovarian cyst.    10/21/2024-no evidence of metastatic disease on the bone scan.    2/7/2025-CT of the chest abdomen and pelvis-new right internal mammary chain lymphadenopathy measuring 2 x 1 cm in greatest dimension.  No hilar lymphadenopathy.  Right epicardial fat pad along the pericardium is larger now measuring 1.9 x 1.4 cm, previously 7 mm.  No evidence of metastatic disease in the abdomen  Left adnexal cystic mass not significantly changed measuring 11 x 6.3 cm right adnexal cystic mass measuring 3.8 x 2.9 cm.  Previously 3.3 x 2.5 cm.    2/7/2025-bone scan without any evidence of metastatic disease.      Interval History:   She presents today for 4 week follow. Yesterday she started her next cycle of Ibrance 10mg daily x3 weeks on with 1 week off. yesterday. denies new concerns- nausea, vomiting, diarrhea, shortness of breath or chest pain. She is doing well.       The following portions of the patient's history were reviewed and updated as appropriate: allergies, current medications, past family history, past medical history, past social history, past surgical history, and problem list.    Past Medical History:   Diagnosis Date    Anemia in neoplastic disease     Arthritis     Arthritis of back     Arthritis of neck     Breast cancer     Left    CVA (cerebral vascular accident)     Encounter for  long-term (current) use of other medications 06/22/2021    Fracture, fibula     Fracture, finger     Frozen shoulder     Hip arthrosis 01/17    Hip pain     RIGHT HIP... CYST    History of fracture of leg 1987    History of radiation therapy     LAST TREATMENT      Hypertension     Knee swelling     Limited joint range of motion (ROM)     RIGHT HIP    Low back strain     Periarthritis of shoulder     Rotator cuff syndrome 6/22    Skin sore     OPEN SORE LEFT BREAST    Syncope     Vertigo         Past Surgical History:   Procedure Laterality Date    AXILLARY LYMPH NODE BIOPSY/EXCISION Right     LYMPH NODE UNDER RIGHT ARM-MALIGNANT (DOUBLE MASTECTOMY)    BREAST BIOPSY Left     MALIGNANT    FRACTURE SURGERY  1987    Leg    HARDWARE REMOVAL      INCISION AND DRAINAGE LEG Left 06/30/2022    Procedure: INCISION AND DRAINAGE left ankle;  Surgeon: Kenneth Tran MD;  Location: [facility];  Service: Orthopedics;  Laterality: Left;    JOINT REPLACEMENT Bilateral mar 2020,july 2021    hips    MASTECTOMY W/ SENTINEL NODE BIOPSY Bilateral 09/16/2019    Procedure: BILATERLA MODIFIED RADICAL MASTECTOMY WITH BILATERAL SENTINEL LYMPH NODE BIOPSY;  Surgeon: Joby Barron Jr., MD;  Location: [facility];  Service: General    TOTAL HIP ARTHROPLASTY Right 03/02/2020    Procedure: RIGHT TOTAL HIP ARTHROPLASTY NATALY NAVIGATION;  Surgeon: Luis M Leonard MD;  Location: [facility] OR;  Service: Orthopedics;  Laterality: Right;    TOTAL HIP ARTHROPLASTY Left 07/20/2021    Procedure: Posterior LEFT TOTAL HIP ARTHROPLASTY NATALY NAVIGATION;  Surgeon: Luis M Leonard MD;  Location: [facility] OR;  Service: Orthopedics;  Laterality: Left;    VENOUS ACCESS DEVICE (PORT) INSERTION Right 02/01/2019    Procedure: INSERTION VENOUS ACCESS DEVICE;  Surgeon: Joby Barron Jr., MD;  Location: [facility];  Service: General    VENOUS ACCESS DEVICE (PORT) REMOVAL N/A 10/30/2019    Procedure: Mediport Removal;  Surgeon: Anderson  "Joby Brito MD;  Location: [facility] OR;  Service: General        Family History   Problem Relation Age of Onset    Lung cancer Father     Irritable bowel syndrome Father     Cancer Mother     Breast cancer Mother     Liver disease Mother     Breast cancer Sister 48    Breast cancer Maternal Grandmother     Breast cancer Paternal Grandmother     Breast cancer Maternal Uncle     Malig Hyperthermia Neg Hx         Social History     Socioeconomic History    Marital status:      Spouse name: Cruz    Number of children: 0   Tobacco Use    Smoking status: Never    Smokeless tobacco: Never   Vaping Use    Vaping status: Never Used   Substance and Sexual Activity    Alcohol use: Never     Comment: occasional    Drug use: No    Sexual activity: Not Currently     Partners: Male     Birth control/protection: Abstinence        OB History          0    Para   0    Term   0       0    AB   0    Living   0         SAB   0    IAB   0    Ectopic   0    Molar   0    Multiple   0    Live Births   0                 Allergies   Allergen Reactions    Diphenhydramine Itching     INCREASES BLOOD PRESSURE    Erythromycin GI Intolerance    Levofloxacin GI Intolerance and Nausea Only    Penicillins GI Intolerance      Objective   Blood pressure 103/72, pulse 71, temperature 98 °F (36.7 °C), temperature source Oral, resp. rate 17, height 165.1 cm (65\"), weight 81.3 kg (179 lb 3.2 oz), SpO2 100%, not currently breastfeeding.       Physical Exam  Constitutional:       Appearance: Normal appearance.   Eyes:      Pupils: Pupils are equal, round, and reactive to light.   Cardiovascular:      Rate and Rhythm: Normal rate.      Heart sounds: Normal heart sounds.   Pulmonary:      Effort: Pulmonary effort is normal.      Breath sounds: Normal breath sounds.   Skin:     General: Skin is warm and dry.      Findings: No rash.   Neurological:      Mental Status: She is oriented to person, place, and time.       Results from last " 7 days   Lab Units 05/02/25  1016   WBC 10*3/mm3 2.75*   NEUTROS ABS 10*3/mm3 1.24*   HEMOGLOBIN g/dL 11.3*   HEMATOCRIT % 35.7   PLATELETS 10*3/mm3 177     Results from last 7 days   Lab Units 05/02/25  1016   SODIUM mmol/L 143   POTASSIUM mmol/L 4.7   CHLORIDE mmol/L 108*   CO2 mmol/L 24.0   BUN mg/dL 10   CREATININE mg/dL 0.93   CALCIUM mg/dL 9.4   ALBUMIN g/dL 4.5   BILIRUBIN mg/dL 0.4   ALK PHOS U/L 81   ALT (SGPT) U/L 16   AST (SGOT) U/L 26   GLUCOSE mg/dL 100*             Assessment & Plan   *Metastatic ER positive breast cancer  Initially presented with neglected breast in 2019 treated with neoadjuvant Adriamycin and Cytoxan followed by Taxol followed by bilateral mastectomies and extra lymph node dissection and adjuvant radiation.  Started on adjuvant Femara after completion of chemotherapy surgery and radiation in 2019  Metastatic recurrence of disease in May 2021 and hence Ribociclib initiated.  Subsequent acute hepatitis from Ribociclib and hence Ribociclib and Femara discontinued  Started Aromasin in March 2022  Metastatic disease recurrence with pericardial mass in January 2024 biopsy to be ER positive WI positive and HER2 0, Tempus demonstrates ESR 1 mutation  Started back on Ribociclib and continued Aromasin in January 2024  Significant elevation in LFTs after initiation of Ribociclib resulting in discontinuation of all treatment in March 2024  She was also initiated on steroids to help with the hepatitis.  Palliative radiation to the mediastinal mass completed in June 2024  CT of the chest abdomen pelvis from June 2024 shows decrease in size of the mass.  Initiated Faslodex in July 2024  CT of the chest abdomen and pelvis and bone scan 10/21/2024 with stable disease except for new enhancing nodule at the right margin of the pericardial mass.  2/7/2025-CT of the chest abdomen pelvis with increase in size of internal mammary chain lymph node as well as increase in size of the epicardial nodule  suggestive of disease progression.  Patient previously was on Ribociclib which had to be discontinued due to hepatitis and hence she was continued on endocrine therapy alone.  Given that she has never truly been on a CDK 4 6 inhibitor, we could consider continuing the Faslodex along with addition of palbociclib.  2/28/2025 Initiating palbociclib at 100 mg 3 out of 4 weeks  3/19/2025-continues on palbociclib and has 2 more days remaining.  Continues on Faslodex.  Scheduled for Faslodex today.  CBC reviewed and WBC count 1.83, hemoglobin 12.4, platelets 133,000, neutrophil count 820  Since she only has 2 more days of ibrance recommend continuing that.  3/28/2025-due for cycle 3 of Ibrance.  Labs reviewed and overall stable to proceed with 100 mg of Ibrance 3 out of 4 weeks.  Neutrophil count right at 1000.  We will recheck labs again in 4 weeks and if there is significant neutropenia below 1000 then plan to decrease the dose to 75 mg daily.  5/2/2025: Continue Ibrance 100mg daily for 3 weeks on with 1 week off. ANC 1240 today.     *Previous Ribociclib induced hepatitis  Patient has developed increase in LFTs on both occasions after use of Ribociclib.  This has been permanently discontinued  Patient has also been started on prednisone after the most recent acute hepatitis from Ribociclib  Currently she is on a 5 mg dose of prednisone  Recommend that she decrease this to 2.5 mg for the next week and subsequently discontinue.  LFTs remain normal today  LFTs continue to remain normal on palbociclib      *Neutropenia  3/19/2025-neutrophil count 820, patient has 2 more days of the cycle 1 remaining of palbociclib.  Recheck on 3/20/2025 when she is due to start the second cycle.  Neutrophil count 1000 on 3/28/2025  5/2/2025: ANC 1240    *Ovarian cyst  Gradual increase in size of the ovarian cyst on the left.  The left ovarian cyst continues to increase in size  Continue to monitor for now     *Atrial fibrillation  Continue  follow-up with Dr. Danni Odell with cardiology  Heart rate normal at stable  Continues on metoprolol.  Continue Eliquis    *Lymphedema of left upper extremity  Continue follow-up with lymphedema clinic    *Hypertension  Blood pressure well-controlled at BP: 103/72     *Vertigo  Intermittent  Somewhat worse more recently  She is currently not using any medications for the same  Could benefit from physical therapy.    *Headaches  Mild and tolerable  Continue Tylenol as needed    PLAN:   Continue palbociclib 100mg daily for 3 weeks on and 1 week off. Cycle 4 initiated on 5/1/2025  Return to clinic in 2 weeks for faslodex with labs per protocol. Continue monthly  Return to clinic in 1 month for MD or NP visit with CBC and CMP  Instructed to reach out sooner with any concerns.       Sarah Umana, APRN

## 2025-05-05 ENCOUNTER — SPECIALTY PHARMACY (OUTPATIENT)
Dept: PHARMACY | Facility: HOSPITAL | Age: 65
End: 2025-05-05
Payer: MEDICARE

## 2025-05-05 NOTE — PROGRESS NOTES
Specialty Pharmacy Note: Ibrance (palbociclib)    Marylu Georges is a 65 y.o. female with breast cancer was seen 5/2/25 by APRN. Per provider dictation, no changes to oral oncology regimen Ibrance (palbociclib).  Labs Review: The CMP and CBC from 5/2/25 have been reviewed. No dose adjustments are needed for the oral specialty medication(s) based on the labs.    Specialty pharmacy will continue to follow patient.    Gina Lopez, PharmD, BCPS  5/5/2025  09:27 EDT

## 2025-05-15 DIAGNOSIS — Z17.0 MALIGNANT NEOPLASM OF OVERLAPPING SITES OF LEFT BREAST IN FEMALE, ESTROGEN RECEPTOR POSITIVE: Primary | ICD-10-CM

## 2025-05-15 DIAGNOSIS — C50.812 MALIGNANT NEOPLASM OF OVERLAPPING SITES OF LEFT BREAST IN FEMALE, ESTROGEN RECEPTOR POSITIVE: Primary | ICD-10-CM

## 2025-05-15 RX ORDER — LAMOTRIGINE 25 MG/1
500 TABLET ORAL ONCE
Status: CANCELLED | OUTPATIENT
Start: 2025-05-15

## 2025-05-16 ENCOUNTER — LAB (OUTPATIENT)
Dept: LAB | Facility: HOSPITAL | Age: 65
End: 2025-05-16
Payer: MEDICARE

## 2025-05-16 ENCOUNTER — INFUSION (OUTPATIENT)
Dept: ONCOLOGY | Facility: HOSPITAL | Age: 65
End: 2025-05-16
Payer: MEDICARE

## 2025-05-16 DIAGNOSIS — C50.812 MALIGNANT NEOPLASM OF OVERLAPPING SITES OF LEFT BREAST IN FEMALE, ESTROGEN RECEPTOR POSITIVE: Primary | ICD-10-CM

## 2025-05-16 DIAGNOSIS — C50.812 MALIGNANT NEOPLASM OF OVERLAPPING SITES OF LEFT BREAST IN FEMALE, ESTROGEN RECEPTOR POSITIVE: ICD-10-CM

## 2025-05-16 DIAGNOSIS — Z17.0 MALIGNANT NEOPLASM OF OVERLAPPING SITES OF LEFT BREAST IN FEMALE, ESTROGEN RECEPTOR POSITIVE: ICD-10-CM

## 2025-05-16 DIAGNOSIS — Z17.0 MALIGNANT NEOPLASM OF OVERLAPPING SITES OF LEFT BREAST IN FEMALE, ESTROGEN RECEPTOR POSITIVE: Primary | ICD-10-CM

## 2025-05-16 LAB
ALBUMIN SERPL-MCNC: 4.8 G/DL (ref 3.5–5.2)
ALBUMIN/GLOB SERPL: 2.3 G/DL
ALP SERPL-CCNC: 85 U/L (ref 39–117)
ALT SERPL W P-5'-P-CCNC: 17 U/L (ref 1–33)
ANION GAP SERPL CALCULATED.3IONS-SCNC: 11.4 MMOL/L (ref 5–15)
AST SERPL-CCNC: 24 U/L (ref 1–32)
BASOPHILS # BLD AUTO: 0.05 10*3/MM3 (ref 0–0.2)
BASOPHILS NFR BLD AUTO: 2.1 % (ref 0–1.5)
BILIRUB SERPL-MCNC: 0.5 MG/DL (ref 0–1.2)
BUN SERPL-MCNC: 16 MG/DL (ref 8–23)
BUN/CREAT SERPL: 15.4 (ref 7–25)
CALCIUM SPEC-SCNC: 9.7 MG/DL (ref 8.6–10.5)
CHLORIDE SERPL-SCNC: 104 MMOL/L (ref 98–107)
CO2 SERPL-SCNC: 23.6 MMOL/L (ref 22–29)
CREAT SERPL-MCNC: 1.04 MG/DL (ref 0.57–1)
DEPRECATED RDW RBC AUTO: 63.7 FL (ref 37–54)
EGFRCR SERPLBLD CKD-EPI 2021: 59 ML/MIN/1.73
EOSINOPHIL # BLD AUTO: 0.06 10*3/MM3 (ref 0–0.4)
EOSINOPHIL NFR BLD AUTO: 2.5 % (ref 0.3–6.2)
ERYTHROCYTE [DISTWIDTH] IN BLOOD BY AUTOMATED COUNT: 17.2 % (ref 12.3–15.4)
GLOBULIN UR ELPH-MCNC: 2.1 GM/DL
GLUCOSE SERPL-MCNC: 104 MG/DL (ref 65–99)
HCT VFR BLD AUTO: 38.5 % (ref 34–46.6)
HGB BLD-MCNC: 12.6 G/DL (ref 12–15.9)
IMM GRANULOCYTES # BLD AUTO: 0.01 10*3/MM3 (ref 0–0.05)
IMM GRANULOCYTES NFR BLD AUTO: 0.4 % (ref 0–0.5)
LYMPHOCYTES # BLD AUTO: 0.72 10*3/MM3 (ref 0.7–3.1)
LYMPHOCYTES NFR BLD AUTO: 30.3 % (ref 19.6–45.3)
MCH RBC QN AUTO: 33.1 PG (ref 26.6–33)
MCHC RBC AUTO-ENTMCNC: 32.7 G/DL (ref 31.5–35.7)
MCV RBC AUTO: 101 FL (ref 79–97)
MONOCYTES # BLD AUTO: 0.18 10*3/MM3 (ref 0.1–0.9)
MONOCYTES NFR BLD AUTO: 7.6 % (ref 5–12)
NEUTROPHILS NFR BLD AUTO: 1.36 10*3/MM3 (ref 1.7–7)
NEUTROPHILS NFR BLD AUTO: 57.1 % (ref 42.7–76)
NRBC BLD AUTO-RTO: 0 /100 WBC (ref 0–0.2)
PLATELET # BLD AUTO: 152 10*3/MM3 (ref 140–450)
PMV BLD AUTO: 8.8 FL (ref 6–12)
POTASSIUM SERPL-SCNC: 4.4 MMOL/L (ref 3.5–5.2)
PROT SERPL-MCNC: 6.9 G/DL (ref 6–8.5)
RBC # BLD AUTO: 3.81 10*6/MM3 (ref 3.77–5.28)
SODIUM SERPL-SCNC: 139 MMOL/L (ref 136–145)
WBC NRBC COR # BLD AUTO: 2.38 10*3/MM3 (ref 3.4–10.8)

## 2025-05-16 PROCEDURE — 36415 COLL VENOUS BLD VENIPUNCTURE: CPT

## 2025-05-16 PROCEDURE — 80053 COMPREHEN METABOLIC PANEL: CPT

## 2025-05-16 PROCEDURE — 96402 CHEMO HORMON ANTINEOPL SQ/IM: CPT

## 2025-05-16 PROCEDURE — 25010000002 FULVESTRANT PER 25 MG: Performed by: NURSE PRACTITIONER

## 2025-05-16 PROCEDURE — 85025 COMPLETE CBC W/AUTO DIFF WBC: CPT

## 2025-05-16 RX ORDER — LAMOTRIGINE 25 MG/1
500 TABLET ORAL ONCE
Status: COMPLETED | OUTPATIENT
Start: 2025-05-16 | End: 2025-05-16

## 2025-05-16 RX ADMIN — FULVESTRANT 500 MG: 50 INJECTION INTRAMUSCULAR at 09:53

## 2025-05-21 ENCOUNTER — SPECIALTY PHARMACY (OUTPATIENT)
Dept: PHARMACY | Facility: HOSPITAL | Age: 65
End: 2025-05-21
Payer: MEDICARE

## 2025-05-21 NOTE — PROGRESS NOTES
Specialty Pharmacy Patient Management Program  Oncology / Hematology Refill Outreach      Marylu was contacted today regarding refills of her Ibrance specialty medication(s).    Specialty medication(s) and dose(s) confirmed: Yes  Ibrance 100 mg daily for 21 days on then 7 days off. Next cycle starts 5/29/2025.    Refill Questions      Flowsheet Row Most Recent Value   Changes to allergies? No   Changes to medications? No   New conditions or infections since last clinic visit No   Unplanned office visit, urgent care, ED, or hospital admission in the last 4 weeks  No   How does patient/caregiver feel medication is working? Good   Financial problems or insurance changes  No   Since the previous refill, were any specialty medication doses or scheduled injections missed or delayed?  No   If yes, please provide the amount 0   Does this patient require a clinical escalation to a pharmacist? No          Delivery Questions      Flowsheet Row Most Recent Value   Delivery method UPS   Delivery address verified with patient/caregiver? Yes  [Ship to home address]   Delivery address Home  [Ship to home address-Ship 5/22 for delivery 5/23-$0 copay with insurance covering 100%-Address Confirmed]   Number of medications in delivery 1   Medication(s) being filled and delivered Palbociclib  [100 mg]   Doses left of specialty medications 0-took her last dose today. Next cycle starts 5/29/2025   Copay verified? Yes   Copay amount $0 copay with insurance covering 100%-deductible met for 2025   Copay form of payment No copayment ($0)   Delivery Date Selection 05/23/25   Signature Required No   Do you consent to receive electronic handouts?  No          Ibrance delivery coordinated with pt for 5/23/2025 to her home address. $0 copay with insurance covering 100%. Her last delivery was on 4/24/2025. No questions or concerns to report to MTM Team today. She reports no missed doses since last delivery. PDC is 88%.    Follow-Up: 21  Days    Estelle Emmanuel, Pharmacy Technician  5/21/2025  15:15 EDT

## 2025-05-28 ENCOUNTER — LAB (OUTPATIENT)
Dept: LAB | Facility: HOSPITAL | Age: 65
End: 2025-05-28
Payer: MEDICARE

## 2025-05-28 ENCOUNTER — OFFICE VISIT (OUTPATIENT)
Dept: ONCOLOGY | Facility: CLINIC | Age: 65
End: 2025-05-28
Payer: MEDICARE

## 2025-05-28 VITALS
OXYGEN SATURATION: 97 % | HEART RATE: 64 BPM | DIASTOLIC BLOOD PRESSURE: 79 MMHG | WEIGHT: 178.4 LBS | BODY MASS INDEX: 29.72 KG/M2 | TEMPERATURE: 97.7 F | HEIGHT: 65 IN | SYSTOLIC BLOOD PRESSURE: 113 MMHG

## 2025-05-28 DIAGNOSIS — Z17.0 MALIGNANT NEOPLASM OF OVERLAPPING SITES OF BOTH BREASTS IN FEMALE, ESTROGEN RECEPTOR POSITIVE: Primary | ICD-10-CM

## 2025-05-28 DIAGNOSIS — C50.812 MALIGNANT NEOPLASM OF OVERLAPPING SITES OF BOTH BREASTS IN FEMALE, ESTROGEN RECEPTOR POSITIVE: Primary | ICD-10-CM

## 2025-05-28 DIAGNOSIS — C50.811 MALIGNANT NEOPLASM OF OVERLAPPING SITES OF BOTH BREASTS IN FEMALE, ESTROGEN RECEPTOR POSITIVE: Primary | ICD-10-CM

## 2025-05-28 DIAGNOSIS — C50.812 MALIGNANT NEOPLASM OF OVERLAPPING SITES OF BOTH BREASTS IN FEMALE, ESTROGEN RECEPTOR POSITIVE: ICD-10-CM

## 2025-05-28 DIAGNOSIS — C50.812 MALIGNANT NEOPLASM OF OVERLAPPING SITES OF LEFT BREAST IN FEMALE, ESTROGEN RECEPTOR POSITIVE: ICD-10-CM

## 2025-05-28 DIAGNOSIS — Z17.0 MALIGNANT NEOPLASM OF OVERLAPPING SITES OF BOTH BREASTS IN FEMALE, ESTROGEN RECEPTOR POSITIVE: ICD-10-CM

## 2025-05-28 DIAGNOSIS — Z17.0 MALIGNANT NEOPLASM OF OVERLAPPING SITES OF LEFT BREAST IN FEMALE, ESTROGEN RECEPTOR POSITIVE: ICD-10-CM

## 2025-05-28 DIAGNOSIS — C50.811 MALIGNANT NEOPLASM OF OVERLAPPING SITES OF BOTH BREASTS IN FEMALE, ESTROGEN RECEPTOR POSITIVE: ICD-10-CM

## 2025-05-28 LAB
ALBUMIN SERPL-MCNC: 4.9 G/DL (ref 3.5–5.2)
ALBUMIN/GLOB SERPL: 2 G/DL
ALP SERPL-CCNC: 90 U/L (ref 39–117)
ALT SERPL W P-5'-P-CCNC: 15 U/L (ref 1–33)
ANION GAP SERPL CALCULATED.3IONS-SCNC: 11.9 MMOL/L (ref 5–15)
AST SERPL-CCNC: 25 U/L (ref 1–32)
BASOPHILS # BLD AUTO: 0.07 10*3/MM3 (ref 0–0.2)
BASOPHILS NFR BLD AUTO: 2.6 % (ref 0–1.5)
BILIRUB SERPL-MCNC: 0.4 MG/DL (ref 0–1.2)
BUN SERPL-MCNC: 13.7 MG/DL (ref 8–23)
BUN/CREAT SERPL: 15.2 (ref 7–25)
CALCIUM SPEC-SCNC: 10 MG/DL (ref 8.6–10.5)
CHLORIDE SERPL-SCNC: 103 MMOL/L (ref 98–107)
CO2 SERPL-SCNC: 26.1 MMOL/L (ref 22–29)
CREAT SERPL-MCNC: 0.9 MG/DL (ref 0.57–1)
DEPRECATED RDW RBC AUTO: 64.8 FL (ref 37–54)
EGFRCR SERPLBLD CKD-EPI 2021: 70.2 ML/MIN/1.73
EOSINOPHIL # BLD AUTO: 0.05 10*3/MM3 (ref 0–0.4)
EOSINOPHIL NFR BLD AUTO: 1.8 % (ref 0.3–6.2)
ERYTHROCYTE [DISTWIDTH] IN BLOOD BY AUTOMATED COUNT: 17.1 % (ref 12.3–15.4)
GLOBULIN UR ELPH-MCNC: 2.4 GM/DL
GLUCOSE SERPL-MCNC: 93 MG/DL (ref 65–99)
HCT VFR BLD AUTO: 39.7 % (ref 34–46.6)
HGB BLD-MCNC: 13.1 G/DL (ref 12–15.9)
IMM GRANULOCYTES # BLD AUTO: 0 10*3/MM3 (ref 0–0.05)
IMM GRANULOCYTES NFR BLD AUTO: 0 % (ref 0–0.5)
LYMPHOCYTES # BLD AUTO: 0.9 10*3/MM3 (ref 0.7–3.1)
LYMPHOCYTES NFR BLD AUTO: 33 % (ref 19.6–45.3)
MCH RBC QN AUTO: 33.8 PG (ref 26.6–33)
MCHC RBC AUTO-ENTMCNC: 33 G/DL (ref 31.5–35.7)
MCV RBC AUTO: 102.3 FL (ref 79–97)
MONOCYTES # BLD AUTO: 0.33 10*3/MM3 (ref 0.1–0.9)
MONOCYTES NFR BLD AUTO: 12.1 % (ref 5–12)
NEUTROPHILS NFR BLD AUTO: 1.38 10*3/MM3 (ref 1.7–7)
NEUTROPHILS NFR BLD AUTO: 50.5 % (ref 42.7–76)
NRBC BLD AUTO-RTO: 0 /100 WBC (ref 0–0.2)
PLATELET # BLD AUTO: 174 10*3/MM3 (ref 140–450)
PMV BLD AUTO: 9 FL (ref 6–12)
POTASSIUM SERPL-SCNC: 4.4 MMOL/L (ref 3.5–5.2)
PROT SERPL-MCNC: 7.3 G/DL (ref 6–8.5)
RBC # BLD AUTO: 3.88 10*6/MM3 (ref 3.77–5.28)
SODIUM SERPL-SCNC: 141 MMOL/L (ref 136–145)
WBC NRBC COR # BLD AUTO: 2.73 10*3/MM3 (ref 3.4–10.8)

## 2025-05-28 PROCEDURE — 36415 COLL VENOUS BLD VENIPUNCTURE: CPT

## 2025-05-28 PROCEDURE — 85025 COMPLETE CBC W/AUTO DIFF WBC: CPT

## 2025-05-28 PROCEDURE — 80053 COMPREHEN METABOLIC PANEL: CPT

## 2025-05-28 NOTE — PROGRESS NOTES
Subjective   Marylu Georges is a 65 y.o. female.  With metastatic ER positive breast cancer.    History of Present Illness     Patient is a postmenopausal 65-year-old  lady who initially presented with a neglected left breast with axillary lymphadenopathy which was measuring up to 9 cm.  The tumor was fixed at the time of presentation.  She was treated with neoadjuvant Adriamycin and Cytoxan followed by Taxol in 2019 and subsequently underwent adjuvant radiation to the left chest wall and axilla.  Placed on adjuvant Femara.      PET/CT on 5/19/2021 which showed 2.6 x 1 cm enlarged lymph node in the right costophrenic fat pad which was new.  SUV 6.8.  Findings consistent with localized metastatic disease.  No other foci of pathological hypermetabolic some identified in the chest.  7 x 4.8 cm unilocular cyst in the left adnexa most likely arises from the ovary.  Slight increase in size since previous scan when it measured 5.5 cm.  The cyst is photopenic supporting a benign etiology.    Patient was initiated on Ribociclib in May 2021 and Femara continued.  September 2021 it was noted that LFTs were elevated and Ribociclib and Femara were placed on hold.  CT chest performed in November 2021 showed resolution of the cardiophrenic angle lymph node.    Resumed Femara only in November 2021.    Patient was seen again 12/29/2021 and had persistent elevation of LFTs although not any worse compared to November 2021.  In fact LFTs had improved with an ALT of 326 and AST of 167.  Due to persistent elevation of the LFTs Femara was discontinued.    1/25/2022-improvement in LFTs with ALT of 90 and AST of 55.  Patient also had liver biopsy which was showing hepatitis.    3/7/2022-patient was initiated on Aromasin 25 mg daily.    In December 2023 patient complained of intermittent chest pain.  This was further evaluated with a CT angiogram of the chest performed on 12/29/2023.  There was an irregular heterogeneous anterior  pericardial mass which extends into the anterior chest wall measuring 6.5 cm transversely and on coronal series 6.5 x 5.2 cm.  There is thickening of the pericardium along the base of the mass and adjacent to the mass as well.  Appearance is suspicious for metastatic disease versus primary malignancy.  Enlarged nodes at both epicardial fat pads largest measuring 1.4 x 0.9 cm.  No evidence of pulmonary embolism.    1/23/2024-PET/CT with enlarging intensely avid anterior pericardial soft tissue mass representing patient's known malignancy.  Adjacent cardiophrenic soft tissue nodules and lymph nodes which are also larger when compared to September 2022 scan.  This raises concern for metastatic disease.  Scattered bilateral subcentimeter pulmonary nodules.  Focal uptake within the pubic symphysis with a focus of osteolysis and apparent mild widening increased from September 2022.  Metastatic disease would be atypical and underlying synovitis or infection cannot be excluded.    Adnexal cyst has increased in size since September 2022 and now measures 6.2 x 9.5 cm.    1/15/2024 biopsy of the mediastinal mass was consistent with metastatic breast cancer.  ER +81 to 90% strong  HI +2% weak  HER2 negative, score 0    Tempus NGS with ESR 1 mutation.    Ribociclib was resumed in January 2024 and LFTs monitored closely.    In March 2024 LFTs started to spike up and subsequently Ribociclib as well as Aromasin was discontinued.     She completed radiation to the metastatic deposit in front of the pericardium in June 2024.    Initiated Faslodex in July 2024.    6/26/2024-CT of the chest abdomen and pelvis  Slight decrease in size of the anterior pericardial mass  Small right pelvic cyst appears stable when compared to 9/7/2022.  Larger left pelvic cyst measures 10.5 x 6.4 cm which has increased in size since September 2022.  Further evaluation is suggested.  If conservative management is elected then short-term follow-up with CT  pelvis recommended.  Status post bilateral mastectomies and bilateral axillary node dissection.      10/21/2024-CT of the chest abdomen and pelvis-decrease in size of the anterior pericardial mass measuring 5.5 x 1.2 cm, previously 5.6 x 1.6 cm.  New enhancing nodule along the right margin measuring 0.7 x 0.6 cm.  No mediastinal or hilar lymphadenopathy.  New bandlike opacity in the lateral aspect of the right apex no suspicious nodularity but focus reevaluation is recommended on subsequent CT chest with contrast.  Stable sub-6 mm left lower lobe pulmonary nodule.  10 x 6.2 cm left adnexal mass.  Stable 3.3 cm right ovarian cyst.    10/21/2024-no evidence of metastatic disease on the bone scan.    2/7/2025-CT of the chest abdomen and pelvis-new right internal mammary chain lymphadenopathy measuring 2 x 1 cm in greatest dimension.  No hilar lymphadenopathy.  Right epicardial fat pad along the pericardium is larger now measuring 1.9 x 1.4 cm, previously 7 mm.  No evidence of metastatic disease in the abdomen  Left adnexal cystic mass not significantly changed measuring 11 x 6.3 cm right adnexal cystic mass measuring 3.8 x 2.9 cm.  Previously 3.3 x 2.5 cm.    2/7/2025-bone scan without any evidence of metastatic disease.      Interval History:   Returns today for follow-up accompanied by her brother.  She continues on Faslodex and Ibrance and tolerating both of these treatments well.  LFTs have fortunately remained normal on Ibrance.  She denies any significant nausea or vomiting.  No frequent infections  ANC remains above 1000.      The following portions of the patient's history were reviewed and updated as appropriate: allergies, current medications, past family history, past medical history, past social history, past surgical history, and problem list.    Past Medical History:   Diagnosis Date    Anemia in neoplastic disease     Arthritis     Arthritis of back     Arthritis of neck     Breast cancer     Left    CVA  (cerebral vascular accident)     Encounter for long-term (current) use of other medications 06/22/2021    Fracture, fibula     Fracture, finger     Frozen shoulder     Hip arthrosis 01/17    Hip pain     RIGHT HIP... CYST    History of fracture of leg 1987    History of radiation therapy     LAST TREATMENT      Hypertension     Knee swelling     Limited joint range of motion (ROM)     RIGHT HIP    Low back strain     Periarthritis of shoulder     Rotator cuff syndrome 6/22    Skin sore     OPEN SORE LEFT BREAST    Syncope     Vertigo         Past Surgical History:   Procedure Laterality Date    AXILLARY LYMPH NODE BIOPSY/EXCISION Right     LYMPH NODE UNDER RIGHT ARM-MALIGNANT (DOUBLE MASTECTOMY)    BREAST BIOPSY Left     MALIGNANT    FRACTURE SURGERY  1987    Leg    HARDWARE REMOVAL      INCISION AND DRAINAGE LEG Left 06/30/2022    Procedure: INCISION AND DRAINAGE left ankle;  Surgeon: Kenneth Tran MD;  Location: [facility];  Service: Orthopedics;  Laterality: Left;    JOINT REPLACEMENT Bilateral mar 2020,july 2021    hips    MASTECTOMY W/ SENTINEL NODE BIOPSY Bilateral 09/16/2019    Procedure: BILATERLA MODIFIED RADICAL MASTECTOMY WITH BILATERAL SENTINEL LYMPH NODE BIOPSY;  Surgeon: Joby Barron Jr., MD;  Location: [facility] OR;  Service: General    TOTAL HIP ARTHROPLASTY Right 03/02/2020    Procedure: RIGHT TOTAL HIP ARTHROPLASTY NATALY NAVIGATION;  Surgeon: Luis M Leonard MD;  Location: [facility] OR;  Service: Orthopedics;  Laterality: Right;    TOTAL HIP ARTHROPLASTY Left 07/20/2021    Procedure: Posterior LEFT TOTAL HIP ARTHROPLASTY NATALY NAVIGATION;  Surgeon: Luis M Leonard MD;  Location: [facility] OR;  Service: Orthopedics;  Laterality: Left;    VENOUS ACCESS DEVICE (PORT) INSERTION Right 02/01/2019    Procedure: INSERTION VENOUS ACCESS DEVICE;  Surgeon: Joby Barron Jr., MD;  Location: [facility];  Service: General    VENOUS ACCESS DEVICE (PORT) REMOVAL N/A 10/30/2019     "Procedure: Mediport Removal;  Surgeon: Joby Barron Jr., MD;  Location: [facility];  Service: General        Family History   Problem Relation Age of Onset    Lung cancer Father     Irritable bowel syndrome Father     Cancer Mother     Breast cancer Mother     Liver disease Mother     Breast cancer Sister 48    Breast cancer Maternal Grandmother     Breast cancer Paternal Grandmother     Breast cancer Maternal Uncle     Malig Hyperthermia Neg Hx         Social History     Socioeconomic History    Marital status:      Spouse name: Cruz    Number of children: 0   Tobacco Use    Smoking status: Never    Smokeless tobacco: Never   Vaping Use    Vaping status: Never Used   Substance and Sexual Activity    Alcohol use: Never     Comment: occasional    Drug use: No    Sexual activity: Not Currently     Partners: Male     Birth control/protection: Abstinence        OB History          0    Para   0    Term   0       0    AB   0    Living   0         SAB   0    IAB   0    Ectopic   0    Molar   0    Multiple   0    Live Births   0                 Allergies   Allergen Reactions    Diphenhydramine Itching     INCREASES BLOOD PRESSURE    Erythromycin GI Intolerance    Levofloxacin GI Intolerance and Nausea Only    Penicillins GI Intolerance      Objective   Blood pressure 113/79, pulse 64, temperature 97.7 °F (36.5 °C), temperature source Oral, height 165.1 cm (65\"), weight 80.9 kg (178 lb 6.4 oz), SpO2 97%, not currently breastfeeding.       Physical Exam  Constitutional:       Appearance: Normal appearance.   Eyes:      Pupils: Pupils are equal, round, and reactive to light.   Cardiovascular:      Rate and Rhythm: Normal rate.      Heart sounds: Normal heart sounds.   Pulmonary:      Effort: Pulmonary effort is normal.      Breath sounds: Normal breath sounds.   Skin:     General: Skin is warm and dry.      Findings: No rash.   Neurological:      Mental Status: She is oriented to person, place, " and time.       I have reexamined the patient and the results are consistent with the previously documented exam. Kayley Miguel MD        Results from last 7 days   Lab Units 05/28/25  0740   WBC 10*3/mm3 2.73*   NEUTROS ABS 10*3/mm3 1.38*   HEMOGLOBIN g/dL 13.1   HEMATOCRIT % 39.7   PLATELETS 10*3/mm3 174     Results from last 7 days   Lab Units 05/28/25  0740   SODIUM mmol/L 141   POTASSIUM mmol/L 4.4   CHLORIDE mmol/L 103   CO2 mmol/L 26.1   BUN mg/dL 13.7   CREATININE mg/dL 0.90   CALCIUM mg/dL 10.0   ALBUMIN g/dL 4.9   BILIRUBIN mg/dL 0.4   ALK PHOS U/L 90   ALT (SGPT) U/L 15   AST (SGOT) U/L 25   GLUCOSE mg/dL 93             Assessment & Plan   *Metastatic ER positive breast cancer  Initially presented with neglected breast in 2019 treated with neoadjuvant Adriamycin and Cytoxan followed by Taxol followed by bilateral mastectomies and extra lymph node dissection and adjuvant radiation.  Started on adjuvant Femara after completion of chemotherapy surgery and radiation in 2019  Metastatic recurrence of disease in May 2021 and hence Ribociclib initiated.  Subsequent acute hepatitis from Ribociclib and hence Ribociclib and Femara discontinued  Started Aromasin in March 2022  Metastatic disease recurrence with pericardial mass in January 2024 biopsy to be ER positive AK positive and HER2 0, Tempus demonstrates ESR 1 mutation  Started back on Ribociclib and continued Aromasin in January 2024  Significant elevation in LFTs after initiation of Ribociclib resulting in discontinuation of all treatment in March 2024  She was also initiated on steroids to help with the hepatitis.  Palliative radiation to the mediastinal mass completed in June 2024  CT of the chest abdomen pelvis from June 2024 shows decrease in size of the mass.  Initiated Faslodex in July 2024  CT of the chest abdomen and pelvis and bone scan 10/21/2024 with stable disease except for new enhancing nodule at the right margin of the pericardial  mass.  2/7/2025-CT of the chest abdomen pelvis with increase in size of internal mammary chain lymph node as well as increase in size of the epicardial nodule suggestive of disease progression.  Patient previously was on Ribociclib which had to be discontinued due to hepatitis and hence she was continued on endocrine therapy alone.  Given that she has never truly been on a CDK 4 6 inhibitor, we could consider continuing the Faslodex along with addition of palbociclib.  2/28/2025 Initiating palbociclib at 100 mg 3 out of 4 weeks  5/28/2025- 5/28/2025-CBC reviewed and WBC 2.73, hemoglobin 13.1 and platelets 174,000, absolute neutrophil count 1.38, CMP reviewed and LFTs normal, creatinine 0.9    *Previous Ribociclib induced hepatitis  Patient has developed increase in LFTs on both occasions after use of Ribociclib.  This has been permanently discontinued  Patient has also been started on prednisone after the most recent acute hepatitis from Ribociclib  Currently she is on a 5 mg dose of prednisone  Recommend that she decrease this to 2.5 mg for the next week and subsequently discontinue.  LFTs normal      *Neutropenia  3/19/2025-neutrophil count 820, patient has 2 more days of the cycle 1 remaining of palbociclib.  Recheck on 3/20/2025 when she is due to start the second cycle.  Neutrophil count 1000 on 3/28/2025  5/28/2025-ANC greater than 1000    *Ovarian cyst  Gradual increase in size of the ovarian cyst on the left.  The left ovarian cyst continues to increase in size  Continue to monitor for now     *Atrial fibrillation  Continue follow-up with Dr. Danni Odell with cardiology  Heart rate normal at stable  Continues on metoprolol.  Continue Eliquis  Heart rate 64    *Lymphedema of left upper extremity  Continue follow-up with lymphedema clinic    *Hypertension  Blood pressure well-controlled at BP: 113/79     *Vertigo  Intermittent  Somewhat worse more recently  She is currently not using any medications for the  same  Stable    *Headaches  Mild and tolerable  Continue Tylenol as needed    PLAN:   Continue palbociclib 100mg daily for 3 weeks on and 1 week off. Cycle 4 initiated on 5/1/2025  Return to clinic in 2 weeks for faslodex with labs per protocol. Continue monthly  Repeat scans and follow-up with MD on 6/20/2025

## 2025-06-02 ENCOUNTER — SPECIALTY PHARMACY (OUTPATIENT)
Dept: PHARMACY | Facility: HOSPITAL | Age: 65
End: 2025-06-02
Payer: MEDICARE

## 2025-06-02 NOTE — PROGRESS NOTES
Specialty Pharmacy Note: Ibrance (palbociclib)    Marylu Georges is a 65 y.o. female with breast cancer was seen 5/28/25 by Dr. Miguel. Per provider dictation, no changes to oral oncology regimen Ibrance (palbociclib).  Labs Review: The CMP and CBC from 5/28/25 have been reviewed. No dose adjustments are needed for the oral specialty medication(s) based on the labs.    Specialty pharmacy will continue to follow patient.    Gina Lopez, PharmD, BCPS  6/2/2025  08:57 EDT

## 2025-06-12 RX ORDER — LAMOTRIGINE 25 MG/1
500 TABLET ORAL ONCE
Status: CANCELLED | OUTPATIENT
Start: 2025-06-13

## 2025-06-13 ENCOUNTER — INFUSION (OUTPATIENT)
Dept: ONCOLOGY | Facility: HOSPITAL | Age: 65
End: 2025-06-13
Payer: MEDICARE

## 2025-06-13 ENCOUNTER — LAB (OUTPATIENT)
Dept: LAB | Facility: HOSPITAL | Age: 65
End: 2025-06-13
Payer: MEDICARE

## 2025-06-13 DIAGNOSIS — Z17.0 MALIGNANT NEOPLASM OF OVERLAPPING SITES OF LEFT BREAST IN FEMALE, ESTROGEN RECEPTOR POSITIVE: Primary | ICD-10-CM

## 2025-06-13 DIAGNOSIS — Z17.0 MALIGNANT NEOPLASM OF OVERLAPPING SITES OF LEFT BREAST IN FEMALE, ESTROGEN RECEPTOR POSITIVE: ICD-10-CM

## 2025-06-13 DIAGNOSIS — C50.812 MALIGNANT NEOPLASM OF OVERLAPPING SITES OF LEFT BREAST IN FEMALE, ESTROGEN RECEPTOR POSITIVE: ICD-10-CM

## 2025-06-13 DIAGNOSIS — C50.812 MALIGNANT NEOPLASM OF OVERLAPPING SITES OF LEFT BREAST IN FEMALE, ESTROGEN RECEPTOR POSITIVE: Primary | ICD-10-CM

## 2025-06-13 LAB
ALBUMIN SERPL-MCNC: 4.5 G/DL (ref 3.5–5.2)
ALBUMIN/GLOB SERPL: 1.9 G/DL
ALP SERPL-CCNC: 95 U/L (ref 39–117)
ALT SERPL W P-5'-P-CCNC: 16 U/L (ref 1–33)
ANION GAP SERPL CALCULATED.3IONS-SCNC: 11.5 MMOL/L (ref 5–15)
AST SERPL-CCNC: 22 U/L (ref 1–32)
BASOPHILS # BLD AUTO: 0.06 10*3/MM3 (ref 0–0.2)
BASOPHILS NFR BLD AUTO: 2.3 % (ref 0–1.5)
BILIRUB SERPL-MCNC: 0.4 MG/DL (ref 0–1.2)
BUN SERPL-MCNC: 18.4 MG/DL (ref 8–23)
BUN/CREAT SERPL: 20 (ref 7–25)
CALCIUM SPEC-SCNC: 9.6 MG/DL (ref 8.6–10.5)
CHLORIDE SERPL-SCNC: 106 MMOL/L (ref 98–107)
CO2 SERPL-SCNC: 23.5 MMOL/L (ref 22–29)
CREAT SERPL-MCNC: 0.92 MG/DL (ref 0.57–1)
DEPRECATED RDW RBC AUTO: 59.7 FL (ref 37–54)
EGFRCR SERPLBLD CKD-EPI 2021: 68.4 ML/MIN/1.73
EOSINOPHIL # BLD AUTO: 0.12 10*3/MM3 (ref 0–0.4)
EOSINOPHIL NFR BLD AUTO: 4.6 % (ref 0.3–6.2)
ERYTHROCYTE [DISTWIDTH] IN BLOOD BY AUTOMATED COUNT: 15.5 % (ref 12.3–15.4)
GLOBULIN UR ELPH-MCNC: 2.4 GM/DL
GLUCOSE SERPL-MCNC: 83 MG/DL (ref 65–99)
HCT VFR BLD AUTO: 39 % (ref 34–46.6)
HGB BLD-MCNC: 12.7 G/DL (ref 12–15.9)
IMM GRANULOCYTES # BLD AUTO: 0.01 10*3/MM3 (ref 0–0.05)
IMM GRANULOCYTES NFR BLD AUTO: 0.4 % (ref 0–0.5)
LYMPHOCYTES # BLD AUTO: 0.78 10*3/MM3 (ref 0.7–3.1)
LYMPHOCYTES NFR BLD AUTO: 29.7 % (ref 19.6–45.3)
MCH RBC QN AUTO: 33.5 PG (ref 26.6–33)
MCHC RBC AUTO-ENTMCNC: 32.6 G/DL (ref 31.5–35.7)
MCV RBC AUTO: 102.9 FL (ref 79–97)
MONOCYTES # BLD AUTO: 0.24 10*3/MM3 (ref 0.1–0.9)
MONOCYTES NFR BLD AUTO: 9.1 % (ref 5–12)
NEUTROPHILS NFR BLD AUTO: 1.42 10*3/MM3 (ref 1.7–7)
NEUTROPHILS NFR BLD AUTO: 53.9 % (ref 42.7–76)
NRBC BLD AUTO-RTO: 0 /100 WBC (ref 0–0.2)
PLATELET # BLD AUTO: 163 10*3/MM3 (ref 140–450)
PMV BLD AUTO: 8.9 FL (ref 6–12)
POTASSIUM SERPL-SCNC: 4.2 MMOL/L (ref 3.5–5.2)
PROT SERPL-MCNC: 6.9 G/DL (ref 6–8.5)
RBC # BLD AUTO: 3.79 10*6/MM3 (ref 3.77–5.28)
SODIUM SERPL-SCNC: 141 MMOL/L (ref 136–145)
WBC NRBC COR # BLD AUTO: 2.63 10*3/MM3 (ref 3.4–10.8)

## 2025-06-13 PROCEDURE — 96402 CHEMO HORMON ANTINEOPL SQ/IM: CPT

## 2025-06-13 PROCEDURE — 36415 COLL VENOUS BLD VENIPUNCTURE: CPT

## 2025-06-13 PROCEDURE — 25010000002 FULVESTRANT PER 25 MG: Performed by: NURSE PRACTITIONER

## 2025-06-13 PROCEDURE — 85025 COMPLETE CBC W/AUTO DIFF WBC: CPT

## 2025-06-13 PROCEDURE — 80053 COMPREHEN METABOLIC PANEL: CPT

## 2025-06-13 RX ORDER — LAMOTRIGINE 25 MG/1
500 TABLET ORAL ONCE
Status: COMPLETED | OUTPATIENT
Start: 2025-06-13 | End: 2025-06-13

## 2025-06-13 RX ADMIN — FULVESTRANT 500 MG: 50 INJECTION INTRAMUSCULAR at 09:15

## 2025-06-16 ENCOUNTER — HOSPITAL ENCOUNTER (OUTPATIENT)
Dept: NUCLEAR MEDICINE | Facility: HOSPITAL | Age: 65
Discharge: HOME | End: 2025-06-16
Payer: MEDICARE

## 2025-06-16 ENCOUNTER — HOSPITAL ENCOUNTER (OUTPATIENT)
Dept: CT IMAGING | Facility: HOSPITAL | Age: 65
Discharge: HOME | End: 2025-06-16
Payer: MEDICARE

## 2025-06-16 DIAGNOSIS — C50.812 MALIGNANT NEOPLASM OF OVERLAPPING SITES OF BOTH BREASTS IN FEMALE, ESTROGEN RECEPTOR POSITIVE: ICD-10-CM

## 2025-06-16 DIAGNOSIS — Z17.0 MALIGNANT NEOPLASM OF OVERLAPPING SITES OF LEFT BREAST IN FEMALE, ESTROGEN RECEPTOR POSITIVE: ICD-10-CM

## 2025-06-16 DIAGNOSIS — C50.811 MALIGNANT NEOPLASM OF OVERLAPPING SITES OF BOTH BREASTS IN FEMALE, ESTROGEN RECEPTOR POSITIVE: ICD-10-CM

## 2025-06-16 DIAGNOSIS — Z17.0 MALIGNANT NEOPLASM OF OVERLAPPING SITES OF BOTH BREASTS IN FEMALE, ESTROGEN RECEPTOR POSITIVE: ICD-10-CM

## 2025-06-16 DIAGNOSIS — C50.812 MALIGNANT NEOPLASM OF OVERLAPPING SITES OF LEFT BREAST IN FEMALE, ESTROGEN RECEPTOR POSITIVE: ICD-10-CM

## 2025-06-16 PROCEDURE — 34310000005 TECHNETIUM MEDRONATE KIT: Performed by: INTERNAL MEDICINE

## 2025-06-16 PROCEDURE — A9503 TC99M MEDRONATE: HCPCS | Performed by: INTERNAL MEDICINE

## 2025-06-16 PROCEDURE — 74177 CT ABD & PELVIS W/CONTRAST: CPT

## 2025-06-16 PROCEDURE — 71260 CT THORAX DX C+: CPT

## 2025-06-16 PROCEDURE — 78306 BONE IMAGING WHOLE BODY: CPT

## 2025-06-16 PROCEDURE — 25510000001 IOPAMIDOL 61 % SOLUTION: Performed by: INTERNAL MEDICINE

## 2025-06-16 RX ORDER — IOPAMIDOL 612 MG/ML
100 INJECTION, SOLUTION INTRAVASCULAR
Status: COMPLETED | OUTPATIENT
Start: 2025-06-16 | End: 2025-06-16

## 2025-06-16 RX ORDER — TC 99M MEDRONATE 20 MG/10ML
22.8 INJECTION, POWDER, LYOPHILIZED, FOR SOLUTION INTRAVENOUS
Status: COMPLETED | OUTPATIENT
Start: 2025-06-16 | End: 2025-06-16

## 2025-06-16 RX ADMIN — Medication 22.8 MILLICURIE: at 09:55

## 2025-06-16 RX ADMIN — IOPAMIDOL 85 ML: 612 INJECTION, SOLUTION INTRAVENOUS at 11:09

## 2025-06-20 ENCOUNTER — OFFICE VISIT (OUTPATIENT)
Dept: ONCOLOGY | Facility: CLINIC | Age: 65
End: 2025-06-20
Payer: MEDICARE

## 2025-06-20 ENCOUNTER — LAB (OUTPATIENT)
Dept: LAB | Facility: HOSPITAL | Age: 65
End: 2025-06-20
Payer: MEDICARE

## 2025-06-20 VITALS
HEIGHT: 65 IN | HEART RATE: 63 BPM | OXYGEN SATURATION: 94 % | DIASTOLIC BLOOD PRESSURE: 71 MMHG | WEIGHT: 179.8 LBS | BODY MASS INDEX: 29.96 KG/M2 | SYSTOLIC BLOOD PRESSURE: 100 MMHG | TEMPERATURE: 97.7 F

## 2025-06-20 DIAGNOSIS — C50.811 MALIGNANT NEOPLASM OF OVERLAPPING SITES OF BOTH BREASTS IN FEMALE, ESTROGEN RECEPTOR POSITIVE: ICD-10-CM

## 2025-06-20 DIAGNOSIS — C50.812 MALIGNANT NEOPLASM OF OVERLAPPING SITES OF LEFT BREAST IN FEMALE, ESTROGEN RECEPTOR POSITIVE: Primary | ICD-10-CM

## 2025-06-20 DIAGNOSIS — Z17.0 MALIGNANT NEOPLASM OF OVERLAPPING SITES OF LEFT BREAST IN FEMALE, ESTROGEN RECEPTOR POSITIVE: ICD-10-CM

## 2025-06-20 DIAGNOSIS — Z17.0 MALIGNANT NEOPLASM OF OVERLAPPING SITES OF BOTH BREASTS IN FEMALE, ESTROGEN RECEPTOR POSITIVE: ICD-10-CM

## 2025-06-20 DIAGNOSIS — C50.812 MALIGNANT NEOPLASM OF OVERLAPPING SITES OF BOTH BREASTS IN FEMALE, ESTROGEN RECEPTOR POSITIVE: ICD-10-CM

## 2025-06-20 DIAGNOSIS — C50.812 MALIGNANT NEOPLASM OF OVERLAPPING SITES OF LEFT BREAST IN FEMALE, ESTROGEN RECEPTOR POSITIVE: ICD-10-CM

## 2025-06-20 DIAGNOSIS — Z17.0 MALIGNANT NEOPLASM OF OVERLAPPING SITES OF LEFT BREAST IN FEMALE, ESTROGEN RECEPTOR POSITIVE: Primary | ICD-10-CM

## 2025-06-20 LAB
BASOPHILS # BLD AUTO: 0.07 10*3/MM3 (ref 0–0.2)
BASOPHILS NFR BLD AUTO: 2.8 % (ref 0–1.5)
DEPRECATED RDW RBC AUTO: 56.7 FL (ref 37–54)
EOSINOPHIL # BLD AUTO: 0.12 10*3/MM3 (ref 0–0.4)
EOSINOPHIL NFR BLD AUTO: 4.7 % (ref 0.3–6.2)
ERYTHROCYTE [DISTWIDTH] IN BLOOD BY AUTOMATED COUNT: 15.2 % (ref 12.3–15.4)
HCT VFR BLD AUTO: 38.3 % (ref 34–46.6)
HGB BLD-MCNC: 12.5 G/DL (ref 12–15.9)
IMM GRANULOCYTES # BLD AUTO: 0.02 10*3/MM3 (ref 0–0.05)
IMM GRANULOCYTES NFR BLD AUTO: 0.8 % (ref 0–0.5)
LYMPHOCYTES # BLD AUTO: 0.74 10*3/MM3 (ref 0.7–3.1)
LYMPHOCYTES NFR BLD AUTO: 29.1 % (ref 19.6–45.3)
MCH RBC QN AUTO: 33 PG (ref 26.6–33)
MCHC RBC AUTO-ENTMCNC: 32.6 G/DL (ref 31.5–35.7)
MCV RBC AUTO: 101.1 FL (ref 79–97)
MONOCYTES # BLD AUTO: 0.22 10*3/MM3 (ref 0.1–0.9)
MONOCYTES NFR BLD AUTO: 8.7 % (ref 5–12)
NEUTROPHILS NFR BLD AUTO: 1.37 10*3/MM3 (ref 1.7–7)
NEUTROPHILS NFR BLD AUTO: 53.9 % (ref 42.7–76)
NRBC BLD AUTO-RTO: 0 /100 WBC (ref 0–0.2)
PLATELET # BLD AUTO: 160 10*3/MM3 (ref 140–450)
PMV BLD AUTO: 9.5 FL (ref 6–12)
RBC # BLD AUTO: 3.79 10*6/MM3 (ref 3.77–5.28)
WBC NRBC COR # BLD AUTO: 2.54 10*3/MM3 (ref 3.4–10.8)

## 2025-06-20 PROCEDURE — 85025 COMPLETE CBC W/AUTO DIFF WBC: CPT

## 2025-06-20 PROCEDURE — 36415 COLL VENOUS BLD VENIPUNCTURE: CPT

## 2025-06-20 NOTE — PROGRESS NOTES
Subjective   Marylu Georges is a 65 y.o. female.  With metastatic ER positive breast cancer.    History of Present Illness     Patient is a postmenopausal 65-year-old  lady who initially presented with a neglected left breast with axillary lymphadenopathy which was measuring up to 9 cm.  The tumor was fixed at the time of presentation.  She was treated with neoadjuvant Adriamycin and Cytoxan followed by Taxol in 2019 and subsequently underwent adjuvant radiation to the left chest wall and axilla.  Placed on adjuvant Femara.      PET/CT on 5/19/2021 which showed 2.6 x 1 cm enlarged lymph node in the right costophrenic fat pad which was new.  SUV 6.8.  Findings consistent with localized metastatic disease.  No other foci of pathological hypermetabolic some identified in the chest.  7 x 4.8 cm unilocular cyst in the left adnexa most likely arises from the ovary.  Slight increase in size since previous scan when it measured 5.5 cm.  The cyst is photopenic supporting a benign etiology.    Patient was initiated on Ribociclib in May 2021 and Femara continued.  September 2021 it was noted that LFTs were elevated and Ribociclib and Femara were placed on hold.  CT chest performed in November 2021 showed resolution of the cardiophrenic angle lymph node.    Resumed Femara only in November 2021.    Patient was seen again 12/29/2021 and had persistent elevation of LFTs although not any worse compared to November 2021.  In fact LFTs had improved with an ALT of 326 and AST of 167.  Due to persistent elevation of the LFTs Femara was discontinued.    1/25/2022-improvement in LFTs with ALT of 90 and AST of 55.  Patient also had liver biopsy which was showing hepatitis.    3/7/2022-patient was initiated on Aromasin 25 mg daily.    In December 2023 patient complained of intermittent chest pain.  This was further evaluated with a CT angiogram of the chest performed on 12/29/2023.  There was an irregular heterogeneous anterior  pericardial mass which extends into the anterior chest wall measuring 6.5 cm transversely and on coronal series 6.5 x 5.2 cm.  There is thickening of the pericardium along the base of the mass and adjacent to the mass as well.  Appearance is suspicious for metastatic disease versus primary malignancy.  Enlarged nodes at both epicardial fat pads largest measuring 1.4 x 0.9 cm.  No evidence of pulmonary embolism.    1/23/2024-PET/CT with enlarging intensely avid anterior pericardial soft tissue mass representing patient's known malignancy.  Adjacent cardiophrenic soft tissue nodules and lymph nodes which are also larger when compared to September 2022 scan.  This raises concern for metastatic disease.  Scattered bilateral subcentimeter pulmonary nodules.  Focal uptake within the pubic symphysis with a focus of osteolysis and apparent mild widening increased from September 2022.  Metastatic disease would be atypical and underlying synovitis or infection cannot be excluded.    Adnexal cyst has increased in size since September 2022 and now measures 6.2 x 9.5 cm.    1/15/2024 biopsy of the mediastinal mass was consistent with metastatic breast cancer.  ER +81 to 90% strong  GA +2% weak  HER2 negative, score 0    Tempus NGS with ESR 1 mutation.    Ribociclib was resumed in January 2024 and LFTs monitored closely.    In March 2024 LFTs started to spike up and subsequently Ribociclib as well as Aromasin was discontinued.     She completed radiation to the metastatic deposit in front of the pericardium in June 2024.    Initiated Faslodex in July 2024.    6/26/2024-CT of the chest abdomen and pelvis  Slight decrease in size of the anterior pericardial mass  Small right pelvic cyst appears stable when compared to 9/7/2022.  Larger left pelvic cyst measures 10.5 x 6.4 cm which has increased in size since September 2022.  Further evaluation is suggested.  If conservative management is elected then short-term follow-up with CT  pelvis recommended.  Status post bilateral mastectomies and bilateral axillary node dissection.      10/21/2024-CT of the chest abdomen and pelvis-decrease in size of the anterior pericardial mass measuring 5.5 x 1.2 cm, previously 5.6 x 1.6 cm.  New enhancing nodule along the right margin measuring 0.7 x 0.6 cm.  No mediastinal or hilar lymphadenopathy.  New bandlike opacity in the lateral aspect of the right apex no suspicious nodularity but focus reevaluation is recommended on subsequent CT chest with contrast.  Stable sub-6 mm left lower lobe pulmonary nodule.  10 x 6.2 cm left adnexal mass.  Stable 3.3 cm right ovarian cyst.    10/21/2024-no evidence of metastatic disease on the bone scan.    2/7/2025-CT of the chest abdomen and pelvis-new right internal mammary chain lymphadenopathy measuring 2 x 1 cm in greatest dimension.  No hilar lymphadenopathy.  Right epicardial fat pad along the pericardium is larger now measuring 1.9 x 1.4 cm, previously 7 mm.  No evidence of metastatic disease in the abdomen  Left adnexal cystic mass not significantly changed measuring 11 x 6.3 cm right adnexal cystic mass measuring 3.8 x 2.9 cm.  Previously 3.3 x 2.5 cm.    2/7/2025-bone scan without any evidence of metastatic disease.    6/16/2025-CT of the chest abdomen and pelvis-stable internal mammary chain lymphadenopathy.  Stable right pericardiophrenic angle soft tissue mass.  New reticulonodular opacities in the lungs.  Stable left greater than right adnexal/ovarian cystic masses    6/16/2025-bone scan without any evidence of metastatic disease    Interval History:   Patient returns today for follow-up accompanied by her brother.  She continues on Ibrance as well as Faslodex and tolerating that well.  She is reporting some sinus congestion but no cough.  No dyspnea.        The following portions of the patient's history were reviewed and updated as appropriate: allergies, current medications, past family history, past  medical history, past social history, past surgical history, and problem list.    Past Medical History:   Diagnosis Date    Anemia in neoplastic disease     Arthritis     Arthritis of back     Arthritis of neck     Breast cancer     Left    CVA (cerebral vascular accident)     Encounter for long-term (current) use of other medications 06/22/2021    Fracture, fibula     Fracture, finger     Frozen shoulder     Hip arthrosis 01/17    Hip pain     RIGHT HIP... CYST    History of fracture of leg 1987    History of radiation therapy     LAST TREATMENT      Hypertension     Knee swelling     Limited joint range of motion (ROM)     RIGHT HIP    Low back strain     Periarthritis of shoulder     Rotator cuff syndrome 6/22    Skin sore     OPEN SORE LEFT BREAST    Syncope     Vertigo         Past Surgical History:   Procedure Laterality Date    AXILLARY LYMPH NODE BIOPSY/EXCISION Right     LYMPH NODE UNDER RIGHT ARM-MALIGNANT (DOUBLE MASTECTOMY)    BREAST BIOPSY Left     MALIGNANT    FRACTURE SURGERY  1987    Leg    HARDWARE REMOVAL      INCISION AND DRAINAGE LEG Left 06/30/2022    Procedure: INCISION AND DRAINAGE left ankle;  Surgeon: Kenneth Tran MD;  Location: [facility];  Service: Orthopedics;  Laterality: Left;    JOINT REPLACEMENT Bilateral mar 2020,july 2021    hips    MASTECTOMY W/ SENTINEL NODE BIOPSY Bilateral 09/16/2019    Procedure: BILATERLA MODIFIED RADICAL MASTECTOMY WITH BILATERAL SENTINEL LYMPH NODE BIOPSY;  Surgeon: Joby Barron Jr., MD;  Location: [facility];  Service: General    TOTAL HIP ARTHROPLASTY Right 03/02/2020    Procedure: RIGHT TOTAL HIP ARTHROPLASTY NATALY NAVIGATION;  Surgeon: Luis M Leonard MD;  Location: [facility] OR;  Service: Orthopedics;  Laterality: Right;    TOTAL HIP ARTHROPLASTY Left 07/20/2021    Procedure: Posterior LEFT TOTAL HIP ARTHROPLASTY NATALY NAVIGATION;  Surgeon: Luis M Leonard MD;  Location: [facility] OR;  Service: Orthopedics;  Laterality:  "Left;    VENOUS ACCESS DEVICE (PORT) INSERTION Right 2019    Procedure: INSERTION VENOUS ACCESS DEVICE;  Surgeon: Joby Barron Jr., MD;  Location:  [facility];  Service: General    VENOUS ACCESS DEVICE (PORT) REMOVAL N/A 10/30/2019    Procedure: Mediport Removal;  Surgeon: Joby Barron Jr., MD;  Location: [facility] MAIN OR;  Service: General        Family History   Problem Relation Age of Onset    Lung cancer Father     Irritable bowel syndrome Father     Cancer Mother     Breast cancer Mother     Liver disease Mother     Breast cancer Sister 48    Breast cancer Maternal Grandmother     Breast cancer Paternal Grandmother     Breast cancer Maternal Uncle     Malig Hyperthermia Neg Hx         Social History     Socioeconomic History    Marital status:      Spouse name: Cruz    Number of children: 0   Tobacco Use    Smoking status: Never    Smokeless tobacco: Never   Vaping Use    Vaping status: Never Used   Substance and Sexual Activity    Alcohol use: Never     Comment: occasional    Drug use: No    Sexual activity: Not Currently     Partners: Male     Birth control/protection: Abstinence        OB History          0    Para   0    Term   0       0    AB   0    Living   0         SAB   0    IAB   0    Ectopic   0    Molar   0    Multiple   0    Live Births   0                 Allergies   Allergen Reactions    Diphenhydramine Itching     INCREASES BLOOD PRESSURE    Erythromycin GI Intolerance    Levofloxacin GI Intolerance and Nausea Only    Penicillins GI Intolerance      Objective   Blood pressure 100/71, pulse 63, temperature 97.7 °F (36.5 °C), temperature source Oral, height 165.1 cm (65\"), weight 81.6 kg (179 lb 12.8 oz), SpO2 94%, not currently breastfeeding.       Physical Exam  Constitutional:       Appearance: Normal appearance.   Eyes:      Pupils: Pupils are equal, round, and reactive to light.   Cardiovascular:      Rate and Rhythm: Normal rate.      Heart sounds: Normal " heart sounds.   Pulmonary:      Effort: Pulmonary effort is normal.      Breath sounds: Normal breath sounds.   Skin:     General: Skin is warm and dry.      Findings: No rash.   Neurological:      Mental Status: She is oriented to person, place, and time.       I have reexamined the patient and the results are consistent with the previously documented exam. Kayley Miguel MD        Results from last 7 days   Lab Units 06/20/25  0925   WBC 10*3/mm3 2.54*   NEUTROS ABS 10*3/mm3 1.37*   HEMOGLOBIN g/dL 12.5   HEMATOCRIT % 38.3   PLATELETS 10*3/mm3 160                   Assessment & Plan   *Metastatic ER positive breast cancer  Initially presented with neglected breast in 2019 treated with neoadjuvant Adriamycin and Cytoxan followed by Taxol followed by bilateral mastectomies and extra lymph node dissection and adjuvant radiation.  Started on adjuvant Femara after completion of chemotherapy surgery and radiation in 2019  Metastatic recurrence of disease in May 2021 and hence Ribociclib initiated.  Subsequent acute hepatitis from Ribociclib and hence Ribociclib and Femara discontinued  Started Aromasin in March 2022  Metastatic disease recurrence with pericardial mass in January 2024 biopsy to be ER positive UT positive and HER2 0, Tempus demonstrates ESR 1 mutation  Started back on Ribociclib and continued Aromasin in January 2024  Significant elevation in LFTs after initiation of Ribociclib resulting in discontinuation of all treatment in March 2024  She was also initiated on steroids to help with the hepatitis.  Palliative radiation to the mediastinal mass completed in June 2024  CT of the chest abdomen pelvis from June 2024 shows decrease in size of the mass.  Initiated Faslodex in July 2024  CT of the chest abdomen and pelvis and bone scan 10/21/2024 with stable disease except for new enhancing nodule at the right margin of the pericardial mass.  2/7/2025-CT of the chest abdomen pelvis with increase in size of  internal mammary chain lymph node as well as increase in size of the epicardial nodule suggestive of disease progression.  Patient previously was on Ribociclib which had to be discontinued due to hepatitis and hence she was continued on endocrine therapy alone.  Given that she has never truly been on a CDK 4 6 inhibitor, we could consider continuing the Faslodex along with addition of palbociclib.  2/28/2025 Initiating palbociclib at 100 mg 3 out of 4 weeks  5/28/2025- 5/28/2025-CBC reviewed and WBC 2.73, hemoglobin 13.1 and platelets 174,000, absolute neutrophil count 1.38, CMP reviewed and LFTs normal, creatinine 0.9  6/16/2025-CT of the chest abdomen and pelvis and bone scan with stable disease.  On the CT chest there is some concern for reticulonodular opacities which could be related to pneumonitis from Ibrance.  This will be watched closely on repeat CT chest.  Since we cannot rule out infection at this time.  If they continue to get worse then we may have to consider different treatment besides Ibrance or starting steroids.    *Previous Ribociclib induced hepatitis  Patient has developed increase in LFTs on both occasions after use of Ribociclib.  This has been permanently discontinued  Patient has also been started on prednisone after the most recent acute hepatitis from Ribociclib  Currently she is on a 5 mg dose of prednisone  Recommend that she decrease this to 2.5 mg for the next week and subsequently discontinue.  6/13/2025-LFTs normal      *Neutropenia  3/19/2025-neutrophil count 820, patient has 2 more days of the cycle 1 remaining of palbociclib.  Recheck on 3/20/2025 when she is due to start the second cycle.  Neutrophil count 1000 on 3/28/2025  6/13/2025-neutrophils greater than thousand    *Ovarian cyst  Gradual increase in size of the ovarian cyst on the left.  The left ovarian cyst continues to increase in size  Continue to monitor for now     *Atrial fibrillation  Continue follow-up with   Danni Odell with cardiology  Heart rate normal at stable  Continues on metoprolol.  Continue Eliquis  Heart rate 63    *Lymphedema of left upper extremity  Continue follow-up with lymphedema clinic    *Hypertension  Blood pressure well-controlled at BP: 100/71     *Vertigo  Intermittent  Somewhat worse more recently  She is currently not using any medications for the same  Stable    *Headaches  Mild and tolerable  Continue Tylenol as needed    PLAN:   Continue palbociclib 100mg daily for 3 weeks on and 1 week off. Cycle 4 initiated on 5/1/2025  Continue Faslodex every 4 weeks  Repeat CT of the chest abdomen pelvis in 3 months  MD follow-up in 3 months with the scans    Patient is on medications requiring closed monitoring for toxicities.  Reviewed the CT of the chest abdomen pelvis images independently and interpreted them independently.  45 minutes total spent on the care today.

## 2025-06-23 ENCOUNTER — SPECIALTY PHARMACY (OUTPATIENT)
Dept: PHARMACY | Facility: HOSPITAL | Age: 65
End: 2025-06-23
Payer: MEDICARE

## 2025-06-23 NOTE — PROGRESS NOTES
Specialty Pharmacy Patient Management Program  Oncology / Hematology Refill Outreach      Marylu was contacted today regarding refills of her Ibrance specialty medication(s).    Specialty medication(s) and dose(s) confirmed: Yes  Ibrance 100 mg daily for 21 days on then 7 days off. Next cycle starts 6/26/2025.    Refill Questions      Flowsheet Row Most Recent Value   Changes to allergies? No   Changes to medications? No   New conditions or infections since last clinic visit No   Unplanned office visit, urgent care, ED, or hospital admission in the last 4 weeks  No   How does patient/caregiver feel medication is working? Good   Financial problems or insurance changes  No   Since the previous refill, were any specialty medication doses or scheduled injections missed or delayed?  No   If yes, please provide the amount 0   Does this patient require a clinical escalation to a pharmacist? No          Delivery Questions      Flowsheet Row Most Recent Value   Delivery method UPS   Delivery address verified with patient/caregiver? Yes  [Ship to home address]   Delivery address Home  [Ship to home address-Ship 6/24 for delivery 6/25-$0 copay with insurance covering 100%-Address Confirmed]   Number of medications in delivery 1   Medication(s) being filled and delivered Palbociclib  [100 mg]   Doses left of specialty medications 0-in off week. Next cycle starts 6/26/2025   Copay verified? Yes   Copay amount $0 copay with insurance covering 100%-deductible met for 2025   Copay form of payment No copayment ($0)   Delivery Date Selection 06/25/25   Signature Required No   Do you consent to receive electronic handouts?  No          Ibrance delivery coordinated with pt for 6/25/2025 to her home address. $0 copay with insurance covering 100%. Her last delivery was on 5/23/2025. No questions or concerns to report to MTM Team today. She reports no missed doses since last delivery. PDC is 92%.    Follow-Up: 21 Days    Estelle BROUSSARD  Cholo, Pharmacy Technician  6/23/2025  09:48 EDT

## 2025-06-23 NOTE — PROGRESS NOTES
Specialty Pharmacy Note: Ibrance (palbociclib)    Marylu Georges is a 65 y.o. female with breast cancer was seen 6/20/25 by Dr. Miguel. Per provider dictation, no changes to oral oncology regimen Ibrance (palbociclib).  Labs Review: The CMP and CBC from 6/20/25 have been reviewed. No dose adjustments are needed for the oral specialty medication(s) based on the labs.    Specialty pharmacy will continue to follow patient.    Gina Lopez, PharmD, BCPS  6/23/2025  08:47 EDT

## 2025-07-10 RX ORDER — LAMOTRIGINE 25 MG/1
500 TABLET ORAL ONCE
Status: CANCELLED | OUTPATIENT
Start: 2025-07-11

## 2025-07-11 ENCOUNTER — INFUSION (OUTPATIENT)
Dept: ONCOLOGY | Facility: HOSPITAL | Age: 65
End: 2025-07-11
Payer: MEDICARE

## 2025-07-11 ENCOUNTER — LAB (OUTPATIENT)
Dept: LAB | Facility: HOSPITAL | Age: 65
End: 2025-07-11
Payer: MEDICARE

## 2025-07-11 DIAGNOSIS — C50.812 MALIGNANT NEOPLASM OF OVERLAPPING SITES OF LEFT BREAST IN FEMALE, ESTROGEN RECEPTOR POSITIVE: ICD-10-CM

## 2025-07-11 DIAGNOSIS — Z17.0 MALIGNANT NEOPLASM OF OVERLAPPING SITES OF LEFT BREAST IN FEMALE, ESTROGEN RECEPTOR POSITIVE: Primary | ICD-10-CM

## 2025-07-11 DIAGNOSIS — C50.812 MALIGNANT NEOPLASM OF OVERLAPPING SITES OF LEFT BREAST IN FEMALE, ESTROGEN RECEPTOR POSITIVE: Primary | ICD-10-CM

## 2025-07-11 DIAGNOSIS — Z17.0 MALIGNANT NEOPLASM OF OVERLAPPING SITES OF LEFT BREAST IN FEMALE, ESTROGEN RECEPTOR POSITIVE: ICD-10-CM

## 2025-07-11 LAB
ALBUMIN SERPL-MCNC: 4.4 G/DL (ref 3.5–5.2)
ALBUMIN/GLOB SERPL: 1.8 G/DL
ALP SERPL-CCNC: 95 U/L (ref 39–117)
ALT SERPL W P-5'-P-CCNC: 21 U/L (ref 1–33)
ANION GAP SERPL CALCULATED.3IONS-SCNC: 10.1 MMOL/L (ref 5–15)
AST SERPL-CCNC: 27 U/L (ref 1–32)
BASOPHILS # BLD AUTO: 0.06 10*3/MM3 (ref 0–0.2)
BASOPHILS NFR BLD AUTO: 2.4 % (ref 0–1.5)
BILIRUB SERPL-MCNC: 0.4 MG/DL (ref 0–1.2)
BUN SERPL-MCNC: 13.9 MG/DL (ref 8–23)
BUN/CREAT SERPL: 16.4 (ref 7–25)
CALCIUM SPEC-SCNC: 9.5 MG/DL (ref 8.6–10.5)
CHLORIDE SERPL-SCNC: 104 MMOL/L (ref 98–107)
CO2 SERPL-SCNC: 23.9 MMOL/L (ref 22–29)
CREAT SERPL-MCNC: 0.85 MG/DL (ref 0.57–1)
DEPRECATED RDW RBC AUTO: 53.1 FL (ref 37–54)
EGFRCR SERPLBLD CKD-EPI 2021: 75.2 ML/MIN/1.73
EOSINOPHIL # BLD AUTO: 0.09 10*3/MM3 (ref 0–0.4)
EOSINOPHIL NFR BLD AUTO: 3.7 % (ref 0.3–6.2)
ERYTHROCYTE [DISTWIDTH] IN BLOOD BY AUTOMATED COUNT: 14 % (ref 12.3–15.4)
GLOBULIN UR ELPH-MCNC: 2.5 GM/DL
GLUCOSE SERPL-MCNC: 101 MG/DL (ref 65–99)
HCT VFR BLD AUTO: 35.4 % (ref 34–46.6)
HGB BLD-MCNC: 11.7 G/DL (ref 12–15.9)
IMM GRANULOCYTES # BLD AUTO: 0.01 10*3/MM3 (ref 0–0.05)
IMM GRANULOCYTES NFR BLD AUTO: 0.4 % (ref 0–0.5)
LYMPHOCYTES # BLD AUTO: 0.5 10*3/MM3 (ref 0.7–3.1)
LYMPHOCYTES NFR BLD AUTO: 20.4 % (ref 19.6–45.3)
MCH RBC QN AUTO: 34.2 PG (ref 26.6–33)
MCHC RBC AUTO-ENTMCNC: 33.1 G/DL (ref 31.5–35.7)
MCV RBC AUTO: 103.5 FL (ref 79–97)
MONOCYTES # BLD AUTO: 0.19 10*3/MM3 (ref 0.1–0.9)
MONOCYTES NFR BLD AUTO: 7.8 % (ref 5–12)
NEUTROPHILS NFR BLD AUTO: 1.6 10*3/MM3 (ref 1.7–7)
NEUTROPHILS NFR BLD AUTO: 65.3 % (ref 42.7–76)
NRBC BLD AUTO-RTO: 0 /100 WBC (ref 0–0.2)
PLATELET # BLD AUTO: 168 10*3/MM3 (ref 140–450)
PMV BLD AUTO: 8.6 FL (ref 6–12)
POTASSIUM SERPL-SCNC: 4.3 MMOL/L (ref 3.5–5.2)
PROT SERPL-MCNC: 6.9 G/DL (ref 6–8.5)
RBC # BLD AUTO: 3.42 10*6/MM3 (ref 3.77–5.28)
SODIUM SERPL-SCNC: 138 MMOL/L (ref 136–145)
WBC NRBC COR # BLD AUTO: 2.45 10*3/MM3 (ref 3.4–10.8)

## 2025-07-11 PROCEDURE — 25010000002 FULVESTRANT PER 25 MG

## 2025-07-11 PROCEDURE — 96402 CHEMO HORMON ANTINEOPL SQ/IM: CPT

## 2025-07-11 PROCEDURE — 80053 COMPREHEN METABOLIC PANEL: CPT

## 2025-07-11 PROCEDURE — 36415 COLL VENOUS BLD VENIPUNCTURE: CPT

## 2025-07-11 PROCEDURE — 85025 COMPLETE CBC W/AUTO DIFF WBC: CPT

## 2025-07-11 RX ORDER — LAMOTRIGINE 25 MG/1
500 TABLET ORAL ONCE
Status: COMPLETED | OUTPATIENT
Start: 2025-07-11 | End: 2025-07-11

## 2025-07-11 RX ADMIN — FULVESTRANT 500 MG: 50 INJECTION INTRAMUSCULAR at 08:23

## 2025-07-18 ENCOUNTER — SPECIALTY PHARMACY (OUTPATIENT)
Dept: PHARMACY | Facility: HOSPITAL | Age: 65
End: 2025-07-18
Payer: MEDICARE

## 2025-07-18 NOTE — PROGRESS NOTES
Specialty Pharmacy Patient Management Program  Oncology / Hematology Refill Outreach      Marylu was contacted today regarding refills of her Ibrance specialty medication(s).    Specialty medication(s) and dose(s) confirmed: Yes  Ibrance 100 mg daily for 21 days on then 7 days off.  Next cycle starts 7/24/2025    Refill Questions      Flowsheet Row Most Recent Value   Changes to allergies? No   Changes to medications? No   New conditions or infections since last clinic visit No   Unplanned office visit, urgent care, ED, or hospital admission in the last 4 weeks  No   How does patient/caregiver feel medication is working? Good   Financial problems or insurance changes  No   Since the previous refill, were any specialty medication doses or scheduled injections missed or delayed?  No   If yes, please provide the amount 0   Does this patient require a clinical escalation to a pharmacist? No          Delivery Questions      Flowsheet Row Most Recent Value   Delivery method UPS   Delivery address verified with patient/caregiver? Yes  [Ship to home address]   Delivery address Home  [Ship to home address-Ship 7/21 for delivery 7/22-$0 copay with insurance covering 100%-Address Confirmed]   Number of medications in delivery 1   Medication(s) being filled and delivered Palbociclib (IBRANCE)  [100 mg tabs]   Doses left of specialty medications 0-in off week. Next cycle starts 7/24/2025   Copay verified? Yes   Copay amount $0.00 copay with insurance covering 100%-deductible met for 2025   Copay form of payment No copayment ($0)   Delivery Date Selection 07/22/25   Signature Required No   Do you consent to receive electronic handouts?  No          Ibrance delivery coordinated with pt for 7/22/2025 to her home address. $0 copay with insurance covering 100%. Her last delivery was on 6/25/2025. No questions or concerns to report to MTM Team today. She reports no missed doses since last delivery. PDC is 92%.    Follow-Up: 21  Days    Estelle Emmanuel, Pharmacy Technician  7/18/2025  10:44 EDT

## 2025-08-08 ENCOUNTER — OFFICE VISIT (OUTPATIENT)
Dept: ONCOLOGY | Facility: CLINIC | Age: 65
End: 2025-08-08
Payer: MEDICARE

## 2025-08-08 ENCOUNTER — INFUSION (OUTPATIENT)
Dept: ONCOLOGY | Facility: HOSPITAL | Age: 65
End: 2025-08-08
Payer: MEDICARE

## 2025-08-08 ENCOUNTER — LAB (OUTPATIENT)
Dept: LAB | Facility: HOSPITAL | Age: 65
End: 2025-08-08
Payer: MEDICARE

## 2025-08-08 VITALS
DIASTOLIC BLOOD PRESSURE: 78 MMHG | HEIGHT: 65 IN | OXYGEN SATURATION: 97 % | TEMPERATURE: 98.3 F | RESPIRATION RATE: 17 BRPM | HEART RATE: 70 BPM | SYSTOLIC BLOOD PRESSURE: 110 MMHG | WEIGHT: 176.7 LBS | BODY MASS INDEX: 29.44 KG/M2

## 2025-08-08 DIAGNOSIS — C50.812 MALIGNANT NEOPLASM OF OVERLAPPING SITES OF LEFT BREAST IN FEMALE, ESTROGEN RECEPTOR POSITIVE: ICD-10-CM

## 2025-08-08 DIAGNOSIS — Z17.0 MALIGNANT NEOPLASM OF OVERLAPPING SITES OF LEFT BREAST IN FEMALE, ESTROGEN RECEPTOR POSITIVE: ICD-10-CM

## 2025-08-08 DIAGNOSIS — Z17.0 MALIGNANT NEOPLASM OF OVERLAPPING SITES OF LEFT BREAST IN FEMALE, ESTROGEN RECEPTOR POSITIVE: Primary | ICD-10-CM

## 2025-08-08 DIAGNOSIS — C50.812 MALIGNANT NEOPLASM OF OVERLAPPING SITES OF LEFT BREAST IN FEMALE, ESTROGEN RECEPTOR POSITIVE: Primary | ICD-10-CM

## 2025-08-08 DIAGNOSIS — Z79.899 HIGH RISK MEDICATION USE: ICD-10-CM

## 2025-08-08 LAB
ALBUMIN SERPL-MCNC: 4.5 G/DL (ref 3.5–5.2)
ALBUMIN/GLOB SERPL: 1.7 G/DL
ALP SERPL-CCNC: 93 U/L (ref 39–117)
ALT SERPL W P-5'-P-CCNC: 37 U/L (ref 1–33)
ANION GAP SERPL CALCULATED.3IONS-SCNC: 12.8 MMOL/L (ref 5–15)
AST SERPL-CCNC: 34 U/L (ref 1–32)
BASOPHILS # BLD AUTO: 0.07 10*3/MM3 (ref 0–0.2)
BASOPHILS NFR BLD AUTO: 3.5 % (ref 0–1.5)
BILIRUB SERPL-MCNC: 0.6 MG/DL (ref 0–1.2)
BUN SERPL-MCNC: 18.3 MG/DL (ref 8–23)
BUN/CREAT SERPL: 21 (ref 7–25)
CALCIUM SPEC-SCNC: 9.6 MG/DL (ref 8.6–10.5)
CHLORIDE SERPL-SCNC: 105 MMOL/L (ref 98–107)
CO2 SERPL-SCNC: 23.2 MMOL/L (ref 22–29)
CREAT SERPL-MCNC: 0.87 MG/DL (ref 0.57–1)
DEPRECATED RDW RBC AUTO: 52.1 FL (ref 37–54)
EGFRCR SERPLBLD CKD-EPI 2021: 73.1 ML/MIN/1.73
EOSINOPHIL # BLD AUTO: 0.07 10*3/MM3 (ref 0–0.4)
EOSINOPHIL NFR BLD AUTO: 3.5 % (ref 0.3–6.2)
ERYTHROCYTE [DISTWIDTH] IN BLOOD BY AUTOMATED COUNT: 14 % (ref 12.3–15.4)
GLOBULIN UR ELPH-MCNC: 2.6 GM/DL
GLUCOSE SERPL-MCNC: 97 MG/DL (ref 65–99)
HCT VFR BLD AUTO: 38.1 % (ref 34–46.6)
HGB BLD-MCNC: 12.3 G/DL (ref 12–15.9)
IMM GRANULOCYTES # BLD AUTO: 0.01 10*3/MM3 (ref 0–0.05)
IMM GRANULOCYTES NFR BLD AUTO: 0.5 % (ref 0–0.5)
LYMPHOCYTES # BLD AUTO: 0.68 10*3/MM3 (ref 0.7–3.1)
LYMPHOCYTES NFR BLD AUTO: 33.8 % (ref 19.6–45.3)
MCH RBC QN AUTO: 33 PG (ref 26.6–33)
MCHC RBC AUTO-ENTMCNC: 32.3 G/DL (ref 31.5–35.7)
MCV RBC AUTO: 102.1 FL (ref 79–97)
MONOCYTES # BLD AUTO: 0.16 10*3/MM3 (ref 0.1–0.9)
MONOCYTES NFR BLD AUTO: 8 % (ref 5–12)
NEUTROPHILS NFR BLD AUTO: 1.02 10*3/MM3 (ref 1.7–7)
NEUTROPHILS NFR BLD AUTO: 50.7 % (ref 42.7–76)
NRBC BLD AUTO-RTO: 0 /100 WBC (ref 0–0.2)
PLATELET # BLD AUTO: 176 10*3/MM3 (ref 140–450)
PMV BLD AUTO: 8.7 FL (ref 6–12)
POTASSIUM SERPL-SCNC: 4.1 MMOL/L (ref 3.5–5.2)
PROT SERPL-MCNC: 7.1 G/DL (ref 6–8.5)
RBC # BLD AUTO: 3.73 10*6/MM3 (ref 3.77–5.28)
SODIUM SERPL-SCNC: 141 MMOL/L (ref 136–145)
WBC NRBC COR # BLD AUTO: 2.01 10*3/MM3 (ref 3.4–10.8)

## 2025-08-08 PROCEDURE — 36415 COLL VENOUS BLD VENIPUNCTURE: CPT

## 2025-08-08 PROCEDURE — 96402 CHEMO HORMON ANTINEOPL SQ/IM: CPT

## 2025-08-08 PROCEDURE — 80053 COMPREHEN METABOLIC PANEL: CPT

## 2025-08-08 PROCEDURE — 85025 COMPLETE CBC W/AUTO DIFF WBC: CPT

## 2025-08-08 PROCEDURE — 25010000002 FULVESTRANT PER 25 MG: Performed by: NURSE PRACTITIONER

## 2025-08-08 RX ORDER — LAMOTRIGINE 25 MG/1
500 TABLET ORAL ONCE
Status: COMPLETED | OUTPATIENT
Start: 2025-08-08 | End: 2025-08-08

## 2025-08-08 RX ORDER — LAMOTRIGINE 25 MG/1
500 TABLET ORAL ONCE
Status: CANCELLED | OUTPATIENT
Start: 2025-08-08

## 2025-08-08 RX ADMIN — FULVESTRANT 500 MG: 50 INJECTION INTRAMUSCULAR at 08:30

## 2025-08-11 ENCOUNTER — SPECIALTY PHARMACY (OUTPATIENT)
Dept: PHARMACY | Facility: HOSPITAL | Age: 65
End: 2025-08-11
Payer: MEDICARE

## 2025-08-14 ENCOUNTER — SPECIALTY PHARMACY (OUTPATIENT)
Dept: PHARMACY | Facility: HOSPITAL | Age: 65
End: 2025-08-14
Payer: MEDICARE

## 2025-08-26 ENCOUNTER — TELEPHONE (OUTPATIENT)
Dept: RADIATION ONCOLOGY | Facility: HOSPITAL | Age: 65
End: 2025-08-26
Payer: MEDICARE

## 2025-08-27 ENCOUNTER — OFFICE VISIT (OUTPATIENT)
Dept: RADIATION ONCOLOGY | Facility: HOSPITAL | Age: 65
End: 2025-08-27
Payer: MEDICARE

## 2025-08-27 VITALS
BODY MASS INDEX: 29.49 KG/M2 | WEIGHT: 177 LBS | OXYGEN SATURATION: 96 % | SYSTOLIC BLOOD PRESSURE: 112 MMHG | HEART RATE: 65 BPM | RESPIRATION RATE: 14 BRPM | DIASTOLIC BLOOD PRESSURE: 77 MMHG

## 2025-08-27 DIAGNOSIS — C50.811 MALIGNANT NEOPLASM OF OVERLAPPING SITES OF BOTH BREASTS IN FEMALE, ESTROGEN RECEPTOR POSITIVE: ICD-10-CM

## 2025-08-27 DIAGNOSIS — Z17.0 MALIGNANT NEOPLASM OF OVERLAPPING SITES OF LEFT BREAST IN FEMALE, ESTROGEN RECEPTOR POSITIVE: Primary | ICD-10-CM

## 2025-08-27 DIAGNOSIS — C50.812 MALIGNANT NEOPLASM OF OVERLAPPING SITES OF LEFT BREAST IN FEMALE, ESTROGEN RECEPTOR POSITIVE: Primary | ICD-10-CM

## 2025-08-27 DIAGNOSIS — Z17.0 MALIGNANT NEOPLASM OF OVERLAPPING SITES OF BOTH BREASTS IN FEMALE, ESTROGEN RECEPTOR POSITIVE: ICD-10-CM

## 2025-08-27 DIAGNOSIS — C50.812 MALIGNANT NEOPLASM OF OVERLAPPING SITES OF BOTH BREASTS IN FEMALE, ESTROGEN RECEPTOR POSITIVE: ICD-10-CM

## 2025-08-27 PROCEDURE — G0463 HOSPITAL OUTPT CLINIC VISIT: HCPCS | Performed by: RADIOLOGY

## (undated) DEVICE — NDL HYPO PRECISIONGLIDE REG 25G 1 1/2

## (undated) DEVICE — SUT VICRYL 1 CT1 27IN  JJ40G

## (undated) DEVICE — TRAP FLD MINIVAC MEGADYNE 100ML

## (undated) DEVICE — GLV SURG PREMIERPRO ORTHO LTX PF SZ8.5 BRN

## (undated) DEVICE — PENCL ES MEGADINE EZ/CLEAN BUTN W/HOLSTR 10FT

## (undated) DEVICE — SUT SILK 2/0 TIES 18IN A185H

## (undated) DEVICE — ANTIBACTERIAL UNDYED BRAIDED (POLYGLACTIN 910), SYNTHETIC ABSORBABLE SUTURE: Brand: COATED VICRYL

## (undated) DEVICE — SUT VIC 2/0 SH 27IN

## (undated) DEVICE — GLV SURG SENSICARE W/ALOE PF LF 9 STRL

## (undated) DEVICE — PATIENT RETURN ELECTRODE, SINGLE-USE, CONTACT QUALITY MONITORING, ADULT, WITH 9FT CORD, FOR PATIENTS WEIGING OVER 33LBS. (15KG): Brand: MEGADYNE

## (undated) DEVICE — GLV SURG PREMIERPRO ORTHO LTX PF SZ7.5 BRN

## (undated) DEVICE — SOL BETADINE 4OZ

## (undated) DEVICE — DRSNG SURESITE WNDW 4X4.5

## (undated) DEVICE — INSTRUMENT BATTERY

## (undated) DEVICE — DRSNG GZ PETROLTM XEROFORM CURAD 1X8IN STRL

## (undated) DEVICE — DECANT BG O JET

## (undated) DEVICE — PK HIP TOTL 40

## (undated) DEVICE — COVER,LIGHT HANDLE,FLX,2/PK: Brand: MEDLINE INDUSTRIES, INC.

## (undated) DEVICE — PILLW ABD SM

## (undated) DEVICE — DRP C/ARM 41X74IN

## (undated) DEVICE — NEEDLE, QUINCKE, 18GX3.5": Brand: MEDLINE

## (undated) DEVICE — HANDPIECE SET WITH COAXIAL HIGH FLOW TIP AND SUCTION TUBE: Brand: INTERPULSE

## (undated) DEVICE — SUT VIC 3/0 PS2 27IN J427H

## (undated) DEVICE — ADHS SKIN DERMABOND TOP ADVANCED

## (undated) DEVICE — SUT VIC 3/0 CTI 36IN J944H

## (undated) DEVICE — SUT MNCRYL 4/0 PS2 18 IN

## (undated) DEVICE — 3M™ STERI-STRIP™ COMPOUND BENZOIN TINCTURE 40 BAGS/CARTON 4 CARTONS/CASE C1544: Brand: 3M™ STERI-STRIP™

## (undated) DEVICE — APPL CHLORAPREP HI/LITE 26ML ORNG

## (undated) DEVICE — STPLR SKIN VISISTAT WD 35CT

## (undated) DEVICE — DRSNG TELFA PAD NONADH STR 1S 3X8IN

## (undated) DEVICE — SUT SILK 2/0 SH CR8 18IN CR8 C012D

## (undated) DEVICE — APPL CHLORAPREP W/TINT 10.5ML PERC STRL

## (undated) DEVICE — SYS CLS SKIN PREMIERPRO EXOFINFUSION 22CM

## (undated) DEVICE — Device

## (undated) DEVICE — CVR TRANSD CIV FLX TPR 11.9 TO 3.8X61CM

## (undated) DEVICE — 450 ML BOTTLE OF 0.05% CHLORHEXIDINE GLUCONATE IN 99.95% STERILE WATER FOR IRRIGATION, USP AND APPLICATOR.: Brand: IRRISEPT ANTIMICROBIAL WOUND LAVAGE

## (undated) DEVICE — GLV SURG BIOGEL LTX PF 8

## (undated) DEVICE — ST IRR CYSTO W/SPK 77IN LF

## (undated) DEVICE — SPNG GZ WOVN 4X4IN 12PLY 10/BX STRL

## (undated) DEVICE — SYR LUERLOK 30CC

## (undated) DEVICE — TBG PENCL TELESCP MEGADYNE SMOKE EVAC 10FT

## (undated) DEVICE — SKIN PREP TRAY W/CHG: Brand: MEDLINE INDUSTRIES, INC.

## (undated) DEVICE — JACKSON-PRATT 100CC BULB RESERVOIR: Brand: CARDINAL HEALTH

## (undated) DEVICE — PREMIUM WET SKIN PREP TRAY: Brand: MEDLINE INDUSTRIES, INC.

## (undated) DEVICE — 3M™ STERI-DRAPE™ INSTRUMENT POUCH 1018: Brand: STERI-DRAPE™

## (undated) DEVICE — OPTIFOAM GENTLE SA, POSTOP, 4X8: Brand: MEDLINE

## (undated) DEVICE — 3M™ STERI-STRIP™ REINFORCED ADHESIVE SKIN CLOSURES, R1547, 1/2 IN X 4 IN (12 MM X 100 MM), 6 STRIPS/ENVELOPE: Brand: 3M™ STERI-STRIP™

## (undated) DEVICE — ORTHOLOCK(R) EX-PIN 4 MM X 150 MM

## (undated) DEVICE — BNDG ESMARK 4IN 12FT LF STRL BLU

## (undated) DEVICE — LOU MINOR PROCEDURE: Brand: MEDLINE INDUSTRIES, INC.

## (undated) DEVICE — PENCL E/S ULTRAVAC TELESCP NOSE HOLSTR 10FT

## (undated) DEVICE — SUT VIC 3/0 SH 27IN J416H

## (undated) DEVICE — COVER,MAYO STAND,STERILE: Brand: MEDLINE

## (undated) DEVICE — SUT MNCRYL PLS ANTIB UD 4/0 PS2 18IN

## (undated) DEVICE — PK ORTHO MINOR 40

## (undated) DEVICE — PK PROC MINOR TOWER 40

## (undated) DEVICE — DRAPE,U/ SHT,SPLIT,PLAS,STERIL: Brand: MEDLINE

## (undated) DEVICE — MEDI-VAC YANKAUER SUCTION HANDLE W/BULBOUS TIP: Brand: CARDINAL HEALTH

## (undated) DEVICE — PAD,ABDOMINAL,8"X10",ST,LF: Brand: MEDLINE

## (undated) DEVICE — BNDG ELAS ELITE V/CLOSE 4IN 5YD LF STRL

## (undated) DEVICE — UNDERCAST PADDING: Brand: DEROYAL

## (undated) DEVICE — BNDG ELAS CO-FLEX SLF ADHR 4IN5YD LF STRL

## (undated) DEVICE — ELECTRD NDL EZ CLN MOD 2.75IN

## (undated) DEVICE — GLV SURG SENSICARE POLYISPRN W/ALOE PF LF 9 GRN STRL

## (undated) DEVICE — DRAPE,REIN 53X77,STERILE: Brand: MEDLINE

## (undated) DEVICE — DRSNG WND BORDR/ADHS NONADHR/GZ LF 4X14IN STRL

## (undated) DEVICE — DISPOSABLE TOURNIQUET CUFF SINGLE BLADDER, SINGLE PORT AND QUICK CONNECT CONNECTOR: Brand: COLOR CUFF

## (undated) DEVICE — PK CHST BRST 40

## (undated) DEVICE — DRSNG TELFA PAD NONADH STR 1S 3X4IN

## (undated) DEVICE — ENCORE® LATEX ORTHO SIZE 7.5, STERILE LATEX POWDER-FREE SURGICAL GLOVE: Brand: ENCORE

## (undated) DEVICE — APPL CHLORAPREP W/TINT 26ML ORNG

## (undated) DEVICE — PREP SOL POVIDONE/IODINE BT 4OZ

## (undated) DEVICE — GLV SURG SENSICARE PI LF PF 8 GRN STRL